# Patient Record
Sex: FEMALE | Race: WHITE | NOT HISPANIC OR LATINO | Employment: OTHER | URBAN - METROPOLITAN AREA
[De-identification: names, ages, dates, MRNs, and addresses within clinical notes are randomized per-mention and may not be internally consistent; named-entity substitution may affect disease eponyms.]

---

## 2017-01-02 ENCOUNTER — PRE VISIT (OUTPATIENT)
Dept: ORTHOPEDICS | Facility: CLINIC | Age: 57
End: 2017-01-02

## 2017-01-02 NOTE — TELEPHONE ENCOUNTER
1.  Date/reason for appt: 01/17/17 2:00pm cervicalgia and arm numbness appt per Kyra in PCP.  MRI and med rec in Epic chart.  No outside recs  2.  Referring provider: Anjel Busby MD  3.  Call to patient (Yes / No - short description): No, pt was referred.   4.  Previous care at / records requested from: Per appt notes, there are no outside recs. Epic has recs and PACS has imaging   [facility] w/ Dr. Busby - 12/20/16, 12/30/16  [facility] w/ Anjel Chris MD - 12/26/16  PACS - MR Cervical Spine (12/22/16)

## 2017-01-04 ENCOUNTER — OFFICE VISIT (OUTPATIENT)
Dept: SLEEP MEDICINE | Facility: CLINIC | Age: 57
End: 2017-01-04
Attending: FAMILY MEDICINE
Payer: COMMERCIAL

## 2017-01-04 VITALS
OXYGEN SATURATION: 96 % | HEIGHT: 66 IN | SYSTOLIC BLOOD PRESSURE: 154 MMHG | WEIGHT: 293 LBS | DIASTOLIC BLOOD PRESSURE: 94 MMHG | BODY MASS INDEX: 47.09 KG/M2 | HEART RATE: 86 BPM

## 2017-01-04 DIAGNOSIS — G47.10 HYPERSOMNOLENCE: ICD-10-CM

## 2017-01-04 DIAGNOSIS — G47.39 OTHER SLEEP APNEA: ICD-10-CM

## 2017-01-04 DIAGNOSIS — E66.01 MORBID OBESITY, UNSPECIFIED OBESITY TYPE (H): Primary | ICD-10-CM

## 2017-01-04 DIAGNOSIS — R06.89 DYSPNEA AND RESPIRATORY ABNORMALITY: ICD-10-CM

## 2017-01-04 DIAGNOSIS — I10 ESSENTIAL HYPERTENSION: ICD-10-CM

## 2017-01-04 DIAGNOSIS — G47.33 OBSTRUCTIVE SLEEP APNEA: ICD-10-CM

## 2017-01-04 DIAGNOSIS — R06.00 DYSPNEA AND RESPIRATORY ABNORMALITY: ICD-10-CM

## 2017-01-04 PROBLEM — R03.0 ELEVATED BLOOD PRESSURE (NOT HYPERTENSION): Status: ACTIVE | Noted: 2017-01-04

## 2017-01-04 PROCEDURE — 99205 OFFICE O/P NEW HI 60 MIN: CPT | Performed by: PHYSICIAN ASSISTANT

## 2017-01-04 RX ORDER — ZOLPIDEM TARTRATE 5 MG/1
TABLET ORAL
Qty: 1 TABLET | Refills: 0 | Status: SHIPPED | OUTPATIENT
Start: 2017-01-04 | End: 2017-01-12

## 2017-01-04 NOTE — PATIENT INSTRUCTIONS

## 2017-01-04 NOTE — MR AVS SNAPSHOT
After Visit Summary   1/4/2017    Shira Rodriguez    MRN: [id number]           Patient Information     Date Of Birth          1960        Visit Information        Provider Department      1/4/2017 9:30 AM Adelaide Livingston PA [facility]        Today's Diagnoses     Morbid obesity, unspecified obesity type (H) [E66.01]    -  1     Hypersomnolence         Obstructive sleep apnea         Essential hypertension         Other sleep apnea [G47.39]         Dyspnea and respiratory abnormality           Care Instructions      Your BMI is Body mass index is 64.75 kg/(m^2).  Weight management is a personal decision.  If you are interested in exploring weight loss strategies, the following discussion covers the approaches that may be successful. Body mass index (BMI) is one way to tell whether you are at a healthy weight, overweight, or obese. It measures your weight in relation to your height.  A BMI of 18.5 to 24.9 is in the healthy range. A person with a BMI of 25 to 29.9 is considered overweight, and someone with a BMI of 30 or greater is considered obese. More than two-thirds of American adults are considered overweight or obese.  Being overweight or obese increases the risk for further weight gain. Excess weight may lead to heart disease and diabetes.  Creating and following plans for healthy eating and physical activity may help you improve your health.  Weight control is part of healthy lifestyle and includes exercise, emotional health, and healthy eating habits. Careful eating habits lifelong are the mainstay of weight control. Though there are significant health benefits from weight loss, long-term weight loss with diet alone may be very difficult to achieve- studies show long-term success with dietary management in less than 10% of people. Attaining a healthy weight may be especially difficult to achieve in those with severe obesity. In some cases, medications, devices and surgical  management might be considered.  What can you do?  If you are overweight or obese and are interested in methods for weight loss, you should discuss this with your provider.     Consider reducing daily calorie intake by 500 calories.     Keep a food journal.     Avoiding skipping meals, consider cutting portions instead.    Diet combined with exercise helps maintain muscle while optimizing fat loss. Strength training is particularly important for building and maintaining muscle mass. Exercise helps reduce stress, increase energy, and improves fitness. Increasing exercise without diet control, however, may not burn enough calories to loose weight.       Start walking three days a week 10-20 minutes at a time    Work towards walking thirty minutes five days a week     Eventually, increase the speed of your walking for 1-2 minutes at time    In addition, we recommend that you review healthy lifestyles and methods for weight loss available through the National Institutes of Health patient information sites:  http://win.niddk.nih.gov/publications/index.htm    And look into health and wellness programs that may be available through your health insurance provider, employer, local community center, or [employer].    Weight management plan: Patient was referred to their PCP to discuss a diet and exercise plan.          Follow-ups after your visit        Your next 10 appointments already scheduled     Romulo 10, 2017 10:00 AM   EMG with Sharan Bonilla MD   [facility] ([facility])    [address], [address]  [facility] [id number] [phone]            Jan 11, 2017  8:00 PM   Psg Split W/Tcm with SLEEP LAB, BED FOUR   [facility] ([facility]    [address] [place] [id number]   [phone]            Jan 12, 2017  1:35 PM   (Arrive by 1:20 PM)   Return Visit with Anjel Busby MD   [facility]  ([facility])    [address]  4th [address]  [facility] [address] [phone]            Jan 17, 2017  9:30 AM   Return Sleep Patient with ZEKE Jarquin   [facility] ([facility])    [address]  [facility] [address]   [phone]            Jan 17, 2017  4:00 PM   (Arrive by 3:45 PM)   New Patient Visit with Tashi Saxena MD   [facility] ([facility])    [address]  [address] [facility] [id number] [phone]              Future tests that were ordered for you today     Open Future Orders        Priority Expected Expires Ordered    ABG-Blood Gas Arterial ([facility] [place] [facility]) Routine  3/5/2017 1/4/2017    Comprehensive Sleep Study Routine  7/3/2017 1/4/2017            Who to contact     If you have questions or need follow up information about today's clinic visit or your schedule please contact [facility] directly at [phone].  Normal or non-critical lab and imaging results will be communicated to you by MyChart, letter or phone within 4 business days after the clinic has received the results. If you do not hear from us within 7 days, please contact the clinic through [employer] or phone. If you have a critical or abnormal lab result, we will notify you by phone as soon as possible.  Submit refill requests through [employer] or call your pharmacy and they will forward the refill request to us. Please allow 3 business days for your refill to be completed.          Additional Information About Your Visit        MyChart Information     [employer] gives you secure access to your electronic health record. If you see a primary care provider, you can also send messages to your care team and make appointments. If you have questions, please call your primary care clinic.  If you do not have a primary care provider, please call  "[phone] and they will assist you.        Care EveryWhere ID     This is your Care EveryWhere ID. This could be used by other organizations to access your [place] medical records  [id number]        Your Vitals Were     Pulse Height BMI (Body Mass Index) Pulse Oximetry          86 1.676 m (5' 6\") 64.75 kg/m2 96%         Blood Pressure from Last 3 Encounters:   01/04/17 174/96   12/30/16 154/79   12/20/16 175/84    Weight from Last 3 Encounters:   01/04/17 181.892 kg (401 lb)   12/30/16 181.892 kg (401 lb)   12/26/16 183.707 kg (405 lb)              We Performed the Following     SLEEP EVALUATION & MANAGEMENT REFERRAL - ADULT          Today's Medication Changes          These changes are accurate as of: 1/4/17 10:32 AM.  If you have any questions, ask your nurse or doctor.               Start taking these medicines.        Dose/Directions    zolpidem 5 MG tablet   Commonly known as:  AMBIEN   Started by:  Adelaide Livingston PA        Take tablet by mouth 15 minutes prior to sleep, for Sleep Study   Quantity:  1 tablet   Refills:  0            Where to get your medicines      Some of these will need a paper prescription and others can be bought over the counter.  Ask your nurse if you have questions.     Bring a paper prescription for each of these medications    - zolpidem 5 MG tablet             Primary Care Provider Office Phone # Fax #    Anjel Busby [phone]       [facility] [address] [place] [id number]        Thank you!     Thank you for choosing [facility]  for your care. Our goal is always to provide you with excellent care. Hearing back from our patients is one way we can continue to improve our services. Please take a few minutes to complete the written survey that you may receive in the mail after your visit with us. Thank you!             Your Updated Medication List - Protect others around you: Learn how to safely use, store and " throw away your medicines at www.disposemymeds.org.          This list is accurate as of: 1/4/17 10:32 AM.  Always use your most recent med list.                   Brand Name Dispense Instructions for use    albuterol 108 (90 BASE) MCG/ACT Inhaler    PROAIR HFA/PROVENTIL HFA/VENTOLIN HFA    1 Inhaler    Inhale 2 puffs into the lungs every 6 hours as needed for shortness of breath / dyspnea or wheezing       baclofen 10 MG tablet    LIORESAL    90 tablet    Take 1 tablet (10 mg) by mouth 3 times daily       budesonide-formoterol 160-4.5 MCG/ACT Inhaler    SYMBICORT    1 Inhaler    Inhale 2 puffs into the lungs 2 times daily       doxycycline 100 MG capsule    VIBRAMYCIN    28 capsule    Take 1 capsule (100 mg) by mouth 2 times daily       fluocinonide 0.05 % ointment    LIDEX    60 g    Apply topically daily To hands and feet when dermatitis active       furosemide 20 MG tablet    LASIX    30 tablet    Take 1 tablet (20 mg) by mouth daily       SERTRALINE HCL PO      Take 50 mg by mouth daily       zolpidem 5 MG tablet    AMBIEN    1 tablet    Take tablet by mouth 15 minutes prior to sleep, for Sleep Study

## 2017-01-04 NOTE — PROGRESS NOTES
Sleep Consultation:    Date on this visit: 1/4/2017    Shira Rodriguez  is referred by Anjel Busby for a sleep consultation.     Primary Physician: Anjel Busby     Chief Complaint   Patient presents with     Sleep Problem     Consult CYRUS, tried and failed CPAP, discuss options and treatment       HPI: Shira Rodriguez is a 56 year old female with medical history remarkable for depression, morbid obesity, numbness and tingling in hands (MRI cervical spine normal) and  CYRUS.     In review, she states sleep has been a problem for more than 30  years. She was originally seen at [facility] for snoring and excessive daytime sleepiness and underwent a sleep study on 1/3/2006.She was diagnosed with moderate obstructive sleep apnea and  prescribed CPAP. She did not tolerate CPAP due to sleeping on stomach.   Sleep study interpreation acquired and her AHI was 16.6( most evident in the supine position). Weigh Information is not available. Lowest O2 sat was 89%.     Shira goes to sleep at 11:00 PM during the week. She sleeps on the sofa.  She wakes up at 5:00 AM with an alarm. She falls asleep in 30 minutes.  She wakes up 2 times a night for 4-5 minutes before falling back to sleep.  Shira wakes up to go to the bathroom.  On weekends, Shira goes to sleep at 12:00 AM.  She wakes up at 7:00 AM with an alarm. She falls asleep in 30 minutes.  Patient gets an average of 6 hours of sleep per night. She does not feel refreshed in the mornings.     Patient does use electronics in bed, watch TV in bed and read in bed.     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     Shira does snore every night and snoring is very loud. Patient does not have a regular bed partner. There is report of snoring and gasping.  She does have witnessed apneas.   Patient sleeps on her back, side and stomach. She has frequent morning dry mouth and no morning headaches, morning confusion and restless legs.  Shira denies  any bruxism, sleep walking, sleep talking, dream enactment, sleep paralysis, cataplexy and hypnogogic/hypnopompic hallucinations.    Shira denies difficulty breathing through her nose, claustrophobia and reflux at night.      Shira has gained 100 pounds in 10 years.  Patient describes herself as a night person.  She would prefer to go to sleep at 11:00 PM and wake up at 6:00 AM.  Patient's [place] Sleepiness score 11/24 consistent with some daytime sleepiness.      Shira naps 7 times per week for 60 minutes, feels unrefreshed after naps. She takes some inadvertant naps.  She denies falling asleep while driving.  Patient was counseled on the importance of driving while alert, to pull over if drowsy, or nap before getting into the vehicle if sleepy.  She uses 3-4 cups/day of coffee. Last caffeine intake is usually before 3 PM.      Allergies:    Allergies   Allergen Reactions     Adhesive Tape Blisters     Erythromycin Rash       Medications:    Current Outpatient Prescriptions   Medication Sig Dispense Refill     zolpidem (AMBIEN) 5 MG tablet Take tablet by mouth 15 minutes prior to sleep, for Sleep Study 1 tablet 0     baclofen (LIORESAL) 10 MG tablet Take 1 tablet (10 mg) by mouth 3 times daily 90 tablet 1     doxycycline (VIBRAMYCIN) 100 MG capsule Take 1 capsule (100 mg) by mouth 2 times daily 28 capsule 0     SERTRALINE HCL PO Take 50 mg by mouth daily       furosemide (LASIX) 20 MG tablet Take 1 tablet (20 mg) by mouth daily 30 tablet 0     albuterol (PROAIR HFA/PROVENTIL HFA/VENTOLIN HFA) 108 (90 BASE) MCG/ACT Inhaler Inhale 2 puffs into the lungs every 6 hours as needed for shortness of breath / dyspnea or wheezing 1 Inhaler 0     fluocinonide (LIDEX) 0.05 % ointment Apply topically daily To hands and feet when dermatitis active 60 g 2     budesonide-formoterol (SYMBICORT) 160-4.5 MCG/ACT inhaler Inhale 2 puffs into the lungs 2 times daily 1 Inhaler 1       Problem List:  Patient Active Problem List     Diagnosis Date Noted     CYRUS (obstructive sleep apnea) 01/04/2017     Priority: Medium     1/30/2016-AHI 16.6, lowest oxygen saturation was 89%       Elevated blood pressure (not hypertension) 01/04/2017     Priority: Medium     Degeneration of cervical intervertebral disc 12/26/2016     Priority: Medium     Arthritis of knee, degenerative 06/14/2012     Priority: Medium     Morbid obesity (H) 01/11/2012     Priority: Medium     Adjustment disorder with depressed mood 10/27/2011     Priority: Medium     Tobacco use disorder 10/27/2011     Priority: Medium     Primary localized osteoarthrosis, pelvic region and thigh 06/02/2004     Priority: Medium        Past Medical/Surgical History:  Past Medical History   Diagnosis Date     Secondary localized osteoarthrosis, pelvic region and thigh 7/12/04     Hospitalized     Contact dermatitis      Traumatic arthropathy      Eczema      HANDS     Osteoarthrosis      Knee pain, left      Cough 7/30/2015     History of total hip replacement 10/27/2011     SOB (shortness of breath) 7/30/2015     Past Surgical History   Procedure Laterality Date     C total hip arthroplasty  07/09/04     RT     Hip arthroscopy[       Arthroplasty knee  6/14/2012     Procedure: ARTHROPLASTY KNEE;  Left Total Knee Arthroplasty;  Surgeon: Annette Quesada MD;  Location:  OR       Social History:  Social History     Social History     Marital Status: Single     Spouse Name: N/A     Number of Children: N/A     Years of Education: N/A     Occupational History     Not on file.     Social History Main Topics     Smoking status: Current Every Day Smoker -- 0.75 packs/day for 33 years     Types: Cigarettes     Start date: 01/01/1982     Smokeless tobacco: Never Used     Alcohol Use: 0.0 oz/week     0 Standard drinks or equivalent per week      Comment: OCCASSIONALLY 2 DRINKS PER MONTH     Drug Use: No     Sexual Activity: Not on file     Other Topics Concern     Not on file     Social History  "Narrative       Family History:  Family History   Problem Relation Age of Onset     Thyroid Disease Mother      sister     Circulatory Mother      CVA p endarderectomy     Hypertension Mother      Breast Cancer Paternal Grandmother      CANCER Paternal Grandmother      pancreatic     Cardiovascular Paternal Aunt      Cardiovascular Paternal Uncle      Depression Other      Skin Cancer Mother      MMohs surgery with skin graft       Review of Systems:  A complete review of systems reviewed by me is negative with the exeption of what has been mentioned in the history of present illness.  CONSTITUTIONAL:  POSITIVE for  drug allergies see list  EYES: NEGATIVE for changes in vision, blind spots, double vision.  ENT: NEGATIVE for ear pain, sore throat, sinus pain, post-nasal drip, runny nose, bloody nose  CARDIAC:  POSITIVE for  swollen legs, swollen feet and hypertension  NEUROLOGIC:  POSITIVE for  weakness or numbness in the arms or legs  DERMATOLOGIC: NEGATIVE for rashes, new moles or change in mole(s)  PULMONARY:  POSITIVE for  SOB with activity, dry cough, productive cough and coughing up blood  GASTROINTESTINAL: NEGATIVE for nausea or vomitting, loose or watery stools, fat or grease in stools, constipation, abdominal pain, bowel movements black in color or blood noted.  GENITOURINARY: NEGATIVE for pain during urination, blood in urine, urinating more frequently than usual, irregular menstrual periods.  MUSCULOSKELETAL:  POSITIVE for  bone or joint pain  ENDOCRINE: NEGATIVE for increased thirst or urination, diabetes.  LYMPHATIC: NEGATIVE for swollen lymph nodes, lumps or bumps in the breasts or nipple discharge.    Physical Examination:  Vitals: /94 mmHg  Pulse 86  Ht 1.676 m (5' 6\")  Wt 181.892 kg (401 lb)  BMI 64.75 kg/m2  SpO2 96%  BMI= Body mass index is 64.75 kg/(m^2).         Brohman Total Score 1/4/2017   Total score - Brohman 11       GENERAL APPEARANCE: alert and no distress  EYES: Eyes grossly " normal to inspection, PERRL and conjunctivae and sclerae normal  HENT: ear canals and TM's normal, nose and mouth without ulcers or lesions, oropharynx crowded and tongue base enlarged  NECK: no adenopathy, no asymmetry, masses, or scars and thyroid normal to palpation  RESP: scattered inspiratory and expiratory wheezes   CV: regular rates and rhythm and normal S1 S2, no S3 or S4  LYMPHATICS: normal ant/post cervical and supraclavicular nodes  MS: pitting 1+ lower extremity edema bilaterally  NEURO: Normal strength and tone, mentation intact and speech normal  PSYCH: mentation appears normal,and affect flat  Mallampati Class: III.  Tonsillar Stage: 1  hidden by pillars.    HEMOGLOBIN   Date Value Ref Range Status   2016 16.7* 11.7 - 15.7 g/dL Final   WBC      7.0   2016  RBC     5.67   2016  HGB     16.7   2016  HCT     50.9   2016  MCV       90   2016  MCH     29.5   2016  MCHC     32.8   2016  RDW     13.7   2016  PLT      216   2016  TSH   Date Value Ref Range Status   2016 1.66 0.40 - 4.00 mU/L Final   ]  Last Basic Metabolic Panel:  NA      139   2016   POTASSIUM      4.2   2016  CHLORIDE      106   2016  MARIA INES      9.2   2016  CO2       26   2016  BUN        9   2016  CR     0.59   2016  GLC       90   2016    Impression:   1. History of moderate obstructive sleep apnea, not currently treated. ~100 lb weight gain since her last sleep study.   2. Morbid obesity with BMI of 64 kg/m^2.   3. Erythrocytosis, high Hgb count   6. suspicion for co-existing sleep related hypoxemia and hypoventilation, related to #1, #2 and #3.   Plan:   In this context, I would recommend the followin. Polysomnogram with transcutaneous carbon dioxide monitoring and pre-study ABG to evaluate for obstructive sleep apnea, hypoventilation and hypoxemia. Ambien if needed.   2. Weight management.     Literature provided  regarding sleep apnea and sleep hygiene.      She will follow up with me in approximately two weeks after her sleep study has been completed to review the results and discuss plan of care.       Polysomnography reviewed.  Obstructive sleep apnea reviewed.  Complications of untreated sleep apnea were reviewed.    I have spent 60 minutes with this patient today in which greater than 50% of this time was spent in the counseling / coordination of care regarding CYRUS and hypoventialtion    Adelaide Livingston PA-C      CC: Anjel Busby

## 2017-01-10 ENCOUNTER — OFFICE VISIT (OUTPATIENT)
Dept: NEUROLOGY | Facility: CLINIC | Age: 57
End: 2017-01-10

## 2017-01-10 DIAGNOSIS — R20.2 PARESTHESIA AND PAIN OF BOTH UPPER EXTREMITIES: ICD-10-CM

## 2017-01-10 DIAGNOSIS — G56.03 BILATERAL CARPAL TUNNEL SYNDROME: Primary | ICD-10-CM

## 2017-01-10 DIAGNOSIS — M79.601 PARESTHESIA AND PAIN OF BOTH UPPER EXTREMITIES: ICD-10-CM

## 2017-01-10 DIAGNOSIS — M79.602 PARESTHESIA AND PAIN OF BOTH UPPER EXTREMITIES: ICD-10-CM

## 2017-01-10 NOTE — Clinical Note
1/10/2017       RE: Shira Rodriguez  [address] [facility] [address]     Dear Colleague,    Thank you for referring your patient, Shira Rodriguez, to the [facility] NEUROLOG[facility] at [facility]. Please see a copy of my visit note below.        [facility]   Electrodiagnostic Laboratory    Nerve Conduction & EMG Report          Patient: Shira Rodriguez YOB: 1960  Patient ID: [id number] Age: 56 Years 8 Months  Gender: Female        History & Examination:  56 year old woman with numbness in both hands, lateral > medial fingers. Sometimes has pain in hands as well. Right more affected than left. Eval for focal neuropathy.     Techniques: Motor and sensory conduction studies were done with surface recording electrodes. EMG was done with a concentric needle electrode.     Results:  Nerve conduction studies:  1. Right median-D2 sensory response is absent.   2. Left median-D2 sensory response shows moderately slowed CV and moderately reduced amplitude.   3. Bilateral ulnar-D5 and radial sensory responses were normal.   4. Left median ulnar palmar interlatency difference was prolonged.   5. Right median-APB motor response showed moderately prolonged DL, mildly reduced amplitude, and normal CV in the forearm.   6. Left median-APB motor response showed mildly prolonged DL, normal amplitude, and normal CV in the forearm.   7. Bilateral ulnar-ADM motor responses were normal.   8. Right median-lumbrical compared to ulnar-interossei interlatency difference was prolonged.     Needle EM. Fibrillation potentials and positive sharp waves were seen in the right APB muscle.    2. Large amplitude and/or long duration motor unit potentials (MUP) with increased polyphasia were seen in the bilateral APB muscles.    3. Rapidly firing MUPs with reduced recruitment were seen in the bilateral APB muscles.       Interpretation:  This is an abnormal study. There is electrophysiologic evidence of a severe right-sided and a moderate left-sided median neuropathy at the wrist (e.g., bilateral carpal tunnel syndrome). Clinical correlation is recommended.      Sharan Bonilla MD  Department of Neurology         Sensory NCS      Nerve / Sites Rec. Site Onset Lat Peak Lat NP Amp Segments Distance Onset Emile Temp.     ms ms  V  cm m/s  C   L MEDIAN - Dig II Anti      Wrist Dig II 3.65 4.95 6.5 Wrist - Dig II 14 38.4 33.6   R MEDIAN - Dig II Anti      Wrist Dig II NR NR NR Wrist - Dig II 14 NR 31.7   L ULNAR - Dig V Anti      Wrist Dig V 2.14 3.02 19.6 Wrist - Dig V 12.5 58.5 33.5   R ULNAR - Dig V Anti      Wrist Dig V 2.03 2.81 18.3 Wrist - Dig V 12.5 61.5 30.8   L RADIAL - Snuffbox      Forearm Snuffbox 1.46 1.98 33.7 Forearm - Snuffbox 10 68.6 34   R RADIAL - Snuffbox      Forearm Snuffbox 1.46 1.82 33.5 Forearm - Snuffbox 10 68.6 32   L MEDIAN PALMAR - Ulnar - Palmar      Palm (Median) Wrist 2.03 2.45 4.6 Palm (Median) - Wrist 8 39.4 33.8      Palmar (Ulnar) Wrist 1.35 1.93 8.1 Palmar (Ulnar) - Wrist 8 59.1 33.6   R MEDIAN PALMAR - Ulnar - Palmar      Palm (Median) Wrist NR NR NR Palm (Median) - Wrist 8 NR 26.2      Palmar (Ulnar) Wrist 1.25 1.82 6.2 Palmar (Ulnar) - Wrist 8 64.0 26.2       Motor NCS      Nerve / Sites Rec. Site Lat Amp Dur. Segments Dist. Emile Temp Area     ms mV %  cm m/s  C %   L MEDIAN - APB      Wrist APB 4.74 7.3 100 Wrist - APB 8  34.3 100      Elbow APB 9.01 7.1 95.8 Elbow - Wrist 23.5 55 34.1 93.8   R MEDIAN - APB      Wrist APB 6.61 4.7 100 Wrist - APB 8  33.3 100      Elbow APB 10.83 4.5 105 Elbow - Wrist 25 59 33.3 103   L ULNAR - ADM      Wrist ADM 2.86 10.2 100 Wrist - ADM 8  33.9 100      B.Elbow ADM 5.99 9.8 108 B.Elbow - Wrist 19 61 34 95.6      A.Elbow ADM 7.34 9.6 107 A.Elbow - B.Elbow 10 74 34 96.5   R ULNAR - ADM      Wrist ADM 2.86 7.8 100 Wrist - ADM 8  33.3 100      B.Elbow ADM 6.04 6.9  106 B.Elbow - Wrist 19.5 61 33.3 92.3      A.Elbow ADM 7.14 7.3 108 A.Elbow - B.Elbow 8 73 33.3 98.5   R MEDIAN - Ulnar comparison      Ulnar Interossei 5.05 1.4 141 Ulnar - Interossei 10  32.2 41.3      Median Lumbical 2.66 5.8 100 Median - Lumbical 10  32.2 100       F  Wave      Nerve Fmin    ms    Left Right   MEDIAN - APB 29.90 31.30   ULNAR - ADM 26.98 27.60       EMG Summary Table     Spontaneous MUAP Recruitment    IA Fib PSW Fasc H.F. Amp Dur. PPP Pattern   R. FIRST D INTEROSS N None None None None N N N N   R. ABD POLL BREVIS 2+ 2+ 2+ None None 1+ 2+ 1+ Mild Reduced   R. FLEX POLL LONG N None None None None N N N N   L. FIRST D INTEROSS N None None None None N N N N   L. ABD POLL BREVIS N None None None None 2+ 1+ 1+ Mild Reduced                                               Again, thank you for allowing me to participate in the care of your patient.      Sincerely,    Sharan Bonilla MD

## 2017-01-10 NOTE — PROGRESS NOTES
[facility] Neurology Clinic   Electrodiagnostic Laboratory    Nerve Conduction & EMG Report          Patient: Shira Rodriguez YOB: 1960  Patient ID: [id number] Age: 56 Years 8 Months  Gender: Female        History & Examination:  56 year old woman with numbness in both hands, lateral > medial fingers. Sometimes has pain in hands as well. Right more affected than left. Eval for focal neuropathy.     Techniques: Motor and sensory conduction studies were done with surface recording electrodes. EMG was done with a concentric needle electrode.     Results:  Nerve conduction studies:  1. Right median-D2 sensory response is absent.   2. Left median-D2 sensory response shows moderately slowed CV and moderately reduced amplitude.   3. Bilateral ulnar-D5 and radial sensory responses were normal.   4. Left median ulnar palmar interlatency difference was prolonged.   5. Right median-APB motor response showed moderately prolonged DL, mildly reduced amplitude, and normal CV in the forearm.   6. Left median-APB motor response showed mildly prolonged DL, normal amplitude, and normal CV in the forearm.   7. Bilateral ulnar-ADM motor responses were normal.   8. Right median-lumbrical compared to ulnar-interossei interlatency difference was prolonged.     Needle EM. Fibrillation potentials and positive sharp waves were seen in the right APB muscle.    2. Large amplitude and/or long duration motor unit potentials (MUP) with increased polyphasia were seen in the bilateral APB muscles.    3. Rapidly firing MUPs with reduced recruitment were seen in the bilateral APB muscles.      Interpretation:  This is an abnormal study. There is electrophysiologic evidence of a severe right-sided and a moderate left-sided median neuropathy at the wrist (e.g., bilateral carpal tunnel syndrome). Clinical correlation is recommended.      Sharan Bonilla MD  Department of Neurology         Sensory NCS       Nerve / Sites Rec. Site Onset Lat Peak Lat NP Amp Segments Distance Onset Emile Temp.     ms ms  V  cm m/s  C   L MEDIAN - Dig II Anti      Wrist Dig II 3.65 4.95 6.5 Wrist - Dig II 14 38.4 33.6   R MEDIAN - Dig II Anti      Wrist Dig II NR NR NR Wrist - Dig II 14 NR 31.7   L ULNAR - Dig V Anti      Wrist Dig V 2.14 3.02 19.6 Wrist - Dig V 12.5 58.5 33.5   R ULNAR - Dig V Anti      Wrist Dig V 2.03 2.81 18.3 Wrist - Dig V 12.5 61.5 30.8   L RADIAL - Snuffbox      Forearm Snuffbox 1.46 1.98 33.7 Forearm - Snuffbox 10 68.6 34   R RADIAL - Snuffbox      Forearm Snuffbox 1.46 1.82 33.5 Forearm - Snuffbox 10 68.6 32   L MEDIAN PALMAR - Ulnar - Palmar      Palm (Median) Wrist 2.03 2.45 4.6 Palm (Median) - Wrist 8 39.4 33.8      Palmar (Ulnar) Wrist 1.35 1.93 8.1 Palmar (Ulnar) - Wrist 8 59.1 33.6   R MEDIAN PALMAR - Ulnar - Palmar      Palm (Median) Wrist NR NR NR Palm (Median) - Wrist 8 NR 26.2      Palmar (Ulnar) Wrist 1.25 1.82 6.2 Palmar (Ulnar) - Wrist 8 64.0 26.2       Motor NCS      Nerve / Sites Rec. Site Lat Amp Dur. Segments Dist. Emile Temp Area     ms mV %  cm m/s  C %   L MEDIAN - APB      Wrist APB 4.74 7.3 100 Wrist - APB 8  34.3 100      Elbow APB 9.01 7.1 95.8 Elbow - Wrist 23.5 55 34.1 93.8   R MEDIAN - APB      Wrist APB 6.61 4.7 100 Wrist - APB 8  33.3 100      Elbow APB 10.83 4.5 105 Elbow - Wrist 25 59 33.3 103   L ULNAR - ADM      Wrist ADM 2.86 10.2 100 Wrist - ADM 8  33.9 100      B.Elbow ADM 5.99 9.8 108 B.Elbow - Wrist 19 61 34 95.6      A.Elbow ADM 7.34 9.6 107 A.Elbow - B.Elbow 10 74 34 96.5   R ULNAR - ADM      Wrist ADM 2.86 7.8 100 Wrist - ADM 8  33.3 100      B.Elbow ADM 6.04 6.9 106 B.Elbow - Wrist 19.5 61 33.3 92.3      A.Elbow ADM 7.14 7.3 108 A.Elbow - B.Elbow 8 73 33.3 98.5   R MEDIAN - Ulnar comparison      Ulnar Interossei 5.05 1.4 141 Ulnar - Interossei 10  32.2 41.3      Median Lumbical 2.66 5.8 100 Median - Lumbical 10  32.2 100       F  Wave      Nerve Fmin    ms    Left Right    MEDIAN - APB 29.90 31.30   ULNAR - ADM 26.98 27.60       EMG Summary Table     Spontaneous MUAP Recruitment    IA Fib PSW Fasc H.F. Amp Dur. PPP Pattern   R. FIRST D INTEROSS N None None None None N N N N   R. ABD POLL BREVIS 2+ 2+ 2+ None None 1+ 2+ 1+ Mild Reduced   R. FLEX POLL LONG N None None None None N N N N   L. FIRST D INTEROSS N None None None None N N N N   L. ABD POLL BREVIS N None None None None 2+ 1+ 1+ Mild Reduced

## 2017-01-11 ENCOUNTER — THERAPY VISIT (OUTPATIENT)
Dept: SLEEP MEDICINE | Facility: CLINIC | Age: 57
End: 2017-01-11
Payer: COMMERCIAL

## 2017-01-11 ENCOUNTER — HOSPITAL ENCOUNTER (OUTPATIENT)
Dept: RESPIRATORY THERAPY | Facility: CLINIC | Age: 57
Discharge: HOME | End: 2017-01-11
Attending: FAMILY MEDICINE | Admitting: FAMILY MEDICINE
Payer: COMMERCIAL

## 2017-01-11 DIAGNOSIS — G47.10 HYPERSOMNOLENCE: ICD-10-CM

## 2017-01-11 DIAGNOSIS — I10 ESSENTIAL HYPERTENSION: ICD-10-CM

## 2017-01-11 DIAGNOSIS — E66.01 MORBID OBESITY, UNSPECIFIED OBESITY TYPE (H): ICD-10-CM

## 2017-01-11 DIAGNOSIS — R06.00 DYSPNEA AND RESPIRATORY ABNORMALITY: ICD-10-CM

## 2017-01-11 DIAGNOSIS — G47.39 OTHER SLEEP APNEA: ICD-10-CM

## 2017-01-11 DIAGNOSIS — R06.89 DYSPNEA AND RESPIRATORY ABNORMALITY: ICD-10-CM

## 2017-01-11 DIAGNOSIS — G47.33 OBSTRUCTIVE SLEEP APNEA: ICD-10-CM

## 2017-01-11 LAB
BASE EXCESS BLDA CALC-SCNC: 0.4 MMOL/L
HCO3 BLD-SCNC: 26 MMOL/L (ref 21–28)
O2/TOTAL GAS SETTING VFR VENT: ABNORMAL %
PCO2 BLD: 43 MM HG (ref 35–45)
PH BLD: 7.39 PH (ref 7.35–7.45)
PO2 BLD: 74 MM HG (ref 80–105)

## 2017-01-11 PROCEDURE — 95811 POLYSOM 6/>YRS CPAP 4/> PARM: CPT | Performed by: FAMILY MEDICINE

## 2017-01-11 PROCEDURE — 82803 BLOOD GASES ANY COMBINATION: CPT | Performed by: PHYSICIAN ASSISTANT

## 2017-01-11 PROCEDURE — 36600 WITHDRAWAL OF ARTERIAL BLOOD: CPT

## 2017-01-12 ENCOUNTER — OFFICE VISIT (OUTPATIENT)
Dept: FAMILY MEDICINE | Facility: CLINIC | Age: 57
End: 2017-01-12

## 2017-01-12 VITALS
HEART RATE: 97 BPM | SYSTOLIC BLOOD PRESSURE: 169 MMHG | WEIGHT: 293 LBS | DIASTOLIC BLOOD PRESSURE: 93 MMHG | BODY MASS INDEX: 64.27 KG/M2

## 2017-01-12 DIAGNOSIS — R06.09 DOE (DYSPNEA ON EXERTION): Primary | ICD-10-CM

## 2017-01-12 DIAGNOSIS — R06.09 DOE (DYSPNEA ON EXERTION): ICD-10-CM

## 2017-01-12 LAB
ANION GAP SERPL CALCULATED.3IONS-SCNC: 7 MMOL/L (ref 3–14)
BASOPHILS # BLD AUTO: 0 10E9/L (ref 0–0.2)
BASOPHILS NFR BLD AUTO: 0.5 %
BUN SERPL-MCNC: 18 MG/DL (ref 7–30)
CALCIUM SERPL-MCNC: 9 MG/DL (ref 8.5–10.1)
CHLORIDE SERPL-SCNC: 106 MMOL/L (ref 94–109)
CO2 SERPL-SCNC: 27 MMOL/L (ref 20–32)
CREAT SERPL-MCNC: 0.51 MG/DL (ref 0.52–1.04)
DIFFERENTIAL METHOD BLD: ABNORMAL
EOSINOPHIL # BLD AUTO: 0.2 10E9/L (ref 0–0.7)
EOSINOPHIL NFR BLD AUTO: 2.4 %
ERYTHROCYTE [DISTWIDTH] IN BLOOD BY AUTOMATED COUNT: 13.9 % (ref 10–15)
GFR SERPL CREATININE-BSD FRML MDRD: ABNORMAL ML/MIN/1.7M2
GLUCOSE SERPL-MCNC: 101 MG/DL (ref 70–99)
HCT VFR BLD AUTO: 50.3 % (ref 35–47)
HGB BLD-MCNC: 16.7 G/DL (ref 11.7–15.7)
IMM GRANULOCYTES # BLD: 0 10E9/L (ref 0–0.4)
IMM GRANULOCYTES NFR BLD: 0.4 %
LYMPHOCYTES # BLD AUTO: 2.1 10E9/L (ref 0.8–5.3)
LYMPHOCYTES NFR BLD AUTO: 27.6 %
MCH RBC QN AUTO: 29.7 PG (ref 26.5–33)
MCHC RBC AUTO-ENTMCNC: 33.2 G/DL (ref 31.5–36.5)
MCV RBC AUTO: 90 FL (ref 78–100)
MONOCYTES # BLD AUTO: 0.6 10E9/L (ref 0–1.3)
MONOCYTES NFR BLD AUTO: 7.1 %
NEUTROPHILS # BLD AUTO: 4.8 10E9/L (ref 1.6–8.3)
NEUTROPHILS NFR BLD AUTO: 62 %
NRBC # BLD AUTO: 0 10*3/UL
NRBC BLD AUTO-RTO: 0 /100
PLATELET # BLD AUTO: 224 10E9/L (ref 150–450)
POTASSIUM SERPL-SCNC: 4.1 MMOL/L (ref 3.4–5.3)
RBC # BLD AUTO: 5.62 10E12/L (ref 3.8–5.2)
SODIUM SERPL-SCNC: 140 MMOL/L (ref 133–144)
WBC # BLD AUTO: 7.8 10E9/L (ref 4–11)

## 2017-01-12 ASSESSMENT — ENCOUNTER SYMPTOMS
SHORTNESS OF BREATH: 1
BOWEL INCONTINENCE: 0
CONSTIPATION: 0
NERVOUS/ANXIOUS: 1
JAUNDICE: 0
EXERCISE INTOLERANCE: 0
NUMBNESS: 1
INSOMNIA: 1
NIGHT SWEATS: 0
COUGH DISTURBING SLEEP: 1
ORTHOPNEA: 0
BLOOD IN STOOL: 0
DISTURBANCES IN COORDINATION: 0
SYNCOPE: 0
SEIZURES: 0
HYPOTENSION: 0
POLYDIPSIA: 0
VOMITING: 0
NECK PAIN: 0
SPEECH CHANGE: 0
SLEEP DISTURBANCES DUE TO BREATHING: 0
BLOATING: 0
DIZZINESS: 1
STIFFNESS: 1
RECTAL PAIN: 0
MUSCLE CRAMPS: 0
FATIGUE: 1
WEAKNESS: 0
TINGLING: 1
POLYPHAGIA: 0
DEPRESSION: 1
ARTHRALGIAS: 1
HALLUCINATIONS: 0
WEIGHT GAIN: 0
MEMORY LOSS: 0
SPUTUM PRODUCTION: 1
PARALYSIS: 0
NAUSEA: 0
CHILLS: 0
HYPERTENSION: 1
DECREASED APPETITE: 0
FEVER: 0
ABDOMINAL PAIN: 0
TACHYCARDIA: 0
DIARRHEA: 0
LOSS OF CONSCIOUSNESS: 0
LIGHT-HEADEDNESS: 1
LEG SWELLING: 1
WEIGHT LOSS: 0
DYSPNEA ON EXERTION: 1
HEARTBURN: 0
MYALGIAS: 1
PANIC: 0
CLAUDICATION: 0
MUSCLE WEAKNESS: 0
COUGH: 1
RESPIRATORY PAIN: 0
LEG PAIN: 0
ALTERED TEMPERATURE REGULATION: 0
SNORES LOUDLY: 1
RECTAL BLEEDING: 1
HEMOPTYSIS: 0
POSTURAL DYSPNEA: 0
INCREASED ENERGY: 0
JOINT SWELLING: 0
PALPITATIONS: 0
HEADACHES: 0
TREMORS: 0
DECREASED CONCENTRATION: 1
WHEEZING: 1
BACK PAIN: 1

## 2017-01-12 ASSESSMENT — PAIN SCALES - GENERAL: PAINLEVEL: NO PAIN (0)

## 2017-01-12 NOTE — PATIENT INSTRUCTIONS
[facility] Medication Refill Request Information:  * Please contact your pharmacy regarding ANY request for medication refills.  ** [facility] Prescription Fax = [phone]  * Please allow 3 business days for routine medication refills.  * Please allow 5 business days for controlled substance medication refills.     [facility] Test Result notification information:  *You will be notified with in 7-10 days of your appointment day regarding the results of your test.  If you are on MyChart you will be notified as soon as the provider has reviewed the results and signed off on them.    Please schedule the following appointments:  Cardiovascular [phone] (3rd Floor [facility])   Lung Science [phone] (3rd Floor [facility])

## 2017-01-12 NOTE — PROGRESS NOTES
Pt arrived at [facility] Sleep lab for sleep study.  Completed a split night PSG per provider order.    Preliminary AHI= 85.  A final therapeutic PAP pressure was not achieved.    Supine REM was not seen on therapeutic pressure.    Patient reports feeling refreshed in AM.    MEDICATIONS: zolpidem (AMBIEN) 5 MG tablet     baclofen (LIORESAL) 10 MG tablet    doxycycline (VIBRAMYCIN) 100 MG capsule   SERTRALINE HCL PO   furosemide (LASIX) 20 MG tablet   albuterol (PROAIR HFA/PROVENTIL HFA/VENTOLIN HFA) 108 (90 BASE) MCG/ACT Inhaler    fluocinonide (LIDEX) 0.05 % ointment   budesonide-formoterol (SYMBICORT) 160-4.5 MCG/ACT inhaler    methylPREDNISolone (MEDROL DOSEPAK) 4 MG tablet    STUDY TYPE: NPSG/CPAP/TCM  SLEEP AID: 5mg Ambien  PAP ACCLIMATION: good, trialed with wisp large and medium f-10 ff mask. Not interested in the nasal pillow.  RESPIRATORY EVENTS: obstructive hypopneas, apneas and rera s resulting in an AHI=85  ECG: Baseline Heart Rate= 78 BPM/NSR with PVC s  SPO2=baseline=94 %, Sincere=83%  SNORING: moderate  TCO2 MONITORING: ABG=43 mmHg, Baseline=51mmHg, Zenith=53 mmHg, treated=49mmHg  SUPPLEMENTAL 02=n/a   HOB ELEVATION=head approximately 35 degrees, legs 20 degrees as at home  TITRATION: using ff f-10 medium at 4cm H20 and increasing for comfort, hypopneas and snore to 10cm H20 and increased to 13cm H20 for obstructions and hypopneas rem supine with bed elevated as at home. Pressure decreased to encourage sleep onset. Trial of bilevel  (19/13) for pt comfort, continued hypopneas, obstructions and desats rem supine. Titration incomplete.  LEAK RATE=0-10--variable  SLEEP STAGES: NREM prior to cpap  MOVEMENT: no  NOTES: Following cpap trial pt had vertigo for moments. Sp02=96% NP=615 BPM

## 2017-01-12 NOTE — PROGRESS NOTES
SUBJECTIVE:    Pt is a 56 year old female with pmh of     Patient Active Problem List   Diagnosis     Primary localized osteoarthrosis, pelvic region and thigh     Adjustment disorder with depressed mood     Tobacco use disorder     Morbid obesity (H)     Arthritis of knee, degenerative     Degeneration of cervical intervertebral disc     CYRUS (obstructive sleep apnea)     Elevated blood pressure (not hypertension)       who is here for evaluation of had concerns including Results and Dizziness.    Here in f/u.  Since last here, several changes.  One, new since last night, no head trauma, gets vertigo w/ position change not with nausea.  Two, last week several days painless blood on toilet paper, stool itself not black/bloody (she is RN with inpt experience), not for last few days  Three, SANTOS, better not gone, in retrospect she thinks building for a year or so, gradually worse. Discussed echo, not great pictures, perhaps could have diastolic dyscn causing sx.  Discussed could do leg u/s, CT PE, stress test, she declined last visit but now will do stress test.  Four, edema which began later 2016, not better on Lasix but much better on pred burst; once gone it came back, now on second burst, and much better edema.  Five, CYRUS, w/u underway likely explains high hgb  Six, pain arms, CTS per EMG, she is puzzled why acute onset; could not tolerate splints at first will retry sees Dr Saxena soon. Normal TSH, not diabetic, Lyme initial test abnl final test normal and finishing doxy  W/o better.   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 Will check CCP for  RF.  Nine, obese, once w/u done will discuss seeing Dr Yogesh Montoya for baseline colonoscopy    Allergies   Allergen Reactions     Adhesive Tape Blisters     Erythromycin Rash     Tearful describing stress of this; she does have psychiatrist she sees routinely        Current Outpatient Prescriptions   Medication Sig Dispense Refill     methylPREDNISolone (MEDROL DOSEPAK) 4 MG tablet Take 2 tablets (8 mg) by mouth See Admin Instructions follow package directions 21 tablet 0     baclofen (LIORESAL) 10 MG tablet Take 1 tablet (10 mg) by mouth 3 times daily 90 tablet 1     doxycycline (VIBRAMYCIN) 100 MG capsule Take 1 capsule (100 mg) by mouth 2 times daily 28 capsule 0     SERTRALINE HCL PO Take 100 mg by mouth daily        furosemide (LASIX) 20 MG tablet Take 1 tablet (20 mg) by mouth daily 30 tablet 0     albuterol (PROAIR HFA/PROVENTIL HFA/VENTOLIN HFA) 108 (90 BASE) MCG/ACT Inhaler Inhale 2 puffs into the lungs every 6 hours as needed for shortness of breath / dyspnea or wheezing 1 Inhaler 0     fluocinonide (LIDEX) 0.05 % ointment Apply topically daily To hands and feet when dermatitis active 60 g 2     budesonide-formoterol (SYMBICORT) 160-4.5 MCG/ACT inhaler Inhale 2 puffs into the lungs 2 times daily 1 Inhaler 1       Social History   Substance Use Topics     Smoking status: Current Every Day Smoker -- 0.75 packs/day for 33 years     Types: Cigarettes     Start date: 01/01/1982     Smokeless tobacco: Never Used     Alcohol Use: 0.0 oz/week     0 Standard drinks or equivalent per week      Comment: OCCASSIONALLY 2 DRINKS PER MONTH           OBJECTIVE:  /93 mmHg  Pulse 97  Wt 180.532 kg (398 lb)  GENERAL APPEARANCE: Alert, no acute distress  RESP: lungs clear to auscultation   CV: normal rate, regular rhythm, no murmur or gallop  MS: Tr edema legs  NEURO: Alert, oriented, speech and mentation normal  PSYCHE: mentation appears normal, affect and mood normal    ASSESSMENT/PLAN:    Arm pain: likely CTS,  unclear if underlying trigger. Retry splints while await Dr Saxena visit  CCP pending, will discuss w/ rheum if abnl  SANTOS: GXT, cardio appt. Also see pulm again (see 2015 note, rvwd).  Edema: unclear why seemingly improves on pred burst--await above rheum labs, monitor  Depression per psychiatry  Vertigo: monitor, new, if ongoing might need MR/ENT visit, lytes normal.   Rectal bleed: cbc normal, get colonoscoy  If no clear causes for multiple sx found, if ongoing, will discuss w/ rheum and/or ID  50 min visit over half couns/coord care discussing multiple sx eval/treat options--she also knows will start BP meds soon      AMANDA JULIEN MD

## 2017-01-12 NOTE — MR AVS SNAPSHOT
After Visit Summary   1/12/2017    Shira Rodriguez    MRN: [id number]           Patient Information     Date Of Birth          1960        Visit Information        Provider Department      1/12/2017 1:35 PM Anjel Busby MD [facility]        Today's Diagnoses     SANTOS (dyspnea on exertion)    -  1       Care Instructions    Primary Care Center Medication Refill Request Information:  * Please contact your pharmacy regarding ANY request for medication refills.  ** [facility] Prescription Fax = [phone]  * Please allow 3 business days for routine medication refills.  * Please allow 5 business days for controlled substance medication refills.     Primary Care Center Test Result notification information:  *You will be notified with in 7-10 days of your appointment day regarding the results of your test.  If you are on MyChart you will be notified as soon as the provider has reviewed the results and signed off on them.    Please schedule the following appointments:  Cardiovascular [phone] (3rd Floor [facility])   Lung Science [phone] (3rd Floor [facility])          Follow-ups after your visit        Additional Services     CARDIOLOGY EVAL ADULT REFERRAL       Your provider has referred you to:  [facility]: [facility] [phone]   [email or web address]    Please be aware that coverage of these services is subject to the terms and limitations of your health insurance plan.  Call member services at your health plan with any benefit or coverage questions.      Type of Referral:  New Cardiology Consult    Timeframe requested:  Less than 1 week    Please bring the following to your appointment:  >>   Any x-rays, CTs or MRIs which have been performed.  Contact the facility where they were done to arrange for  prior to your scheduled appointment.    >>   List of current  medications  >>   This referral request   >>   Any documents/labs given to you for this referral            PULMONARY MEDICINE REFERRAL       Your provider has referred you to: [facility] [place] [phone]   [email or web address]    F/u Dr Richardson    Please be aware that coverage of these services is subject to the terms and limitations of your health insurance plan.  Call member services at your health plan with any benefit or coverage questions.      Please bring the following with you to your appointment:    (1) Any X-Rays, CTs or MRIs which have been performed.  Contact the facility where they were done to arrange for  prior to your scheduled appointment.    (2) List of current medications   (3) This referral request   (4) Any documents/labs given to you for this referral                  Follow-up notes from your care team     Return in about 1 week (around 1/19/2017).      Your next 10 appointments already scheduled     Jan 16, 2017  9:00 AM   (Arrive by 8:45 AM)   New Patient Visit with Brigette Valadez MD   [facility] ([facility])    [address]  3rd Floor  [facility] [address] [phone]            Jan 16, 2017 11:30 AM   NM INJECTION with [email or web address]   [facility], Nuclear Medicine ([facility])    [address] [address] [phone]            Jan 16, 2017 12:15 PM   NM SCAN3 with UUNM1   [facility], Nuclear Medicine ([facility])    [address] [address] [phone]            Jan 16, 2017 12:45 PM   Ekg Stress Nm Adenosine with UUEKGNMS   [facility] ([facility])    [address] [place] [id number]               Jan 16, 2017  1:45 PM   NM MPI ADENOSINE with  UUNM1   [facility] [place], Nuclear Medicine ([facility] [place])    [address] [address] [phone]           For a ONE day exam: Allow 3-4 hours for test. For a TWO day exam: Allow 2 hours PER day for test.  You may need to stop some medicines before the test. Follow your doctor s orders. - If you take a beta blocker: Follow your doctor s specific instructions on taking it prior to and on the day of your exam. - If you take Aggrenox or dipyridamole (Persantine, Permole), stop taking it 48 hours before your test. - If you take Viagra, Cialis or Levitra, stop taking it 48 hours before your test. - If you take theophylline or aminophylline, stop taking it 12 hours before your test.  For patients with diabetes: - If you take insulin, call your diabetes care team. Ask if you should take a 1/2 dose the morning of your test. - If you take diabetes medicine by mouth, don t take it on the morning of your test. Bring it with you to take after the test. (If you have questions, call your diabetes care team.)  Do not take nitrates on the day of your test. Do not wear your Nitro-Patch.  Stop all caffeine 12 hours before the test. This includes coffee, tea, soda pop, chocolate and certain medicines (such as Anacin, Excedrin and NoDoz). Also avoid decaf coffee and tea, as these contain small amounts of caffeine.  No alcohol, smoking or other tobacco for 12 hours before the test.  Stop eating 3 hours before the test. You may drink water.  Please wear a loose two-piece outfit. If you will have an exercise test, bring rubber-soled walking shoes.  When you arrive, please tell us if you: - Have diabetes - Are breastfeeding - May be pregnant - Have a pacemaker of ICD (implantable defibrillator).  Please call your Imaging Department at your exam site with any questions.            Jan 17, 2017  9:30 AM   Return Sleep Patient with ZEKE Jarquin   [facility]  [facility])    [address]  [facility] [address]   [phone]            Jan 17, 2017  4:00 PM   (Arrive by 3:45 PM)   New Patient Visit with Tashi Saxena MD   [facility] ([facility])    [address]  4th Floor  [facility] [address] [phone]            Jan 19, 2017  3:05 PM   (Arrive by 2:50 PM)   Return Visit with Anjel Busby MD   [facility] ([facility])    [address]  4th Floor  [facility] [id number] [phone]            Mar 06, 2017  6:30 PM   (Arrive by 6:15 PM)   Return Visit with Georgina Richardson MD   [facility]    [address] [place] [address]  3rd Floor  [facility] [id number] [phone]              Future tests that were ordered for you today     Open Future Orders        Priority Expected Expires Ordered    NM Adenosine stress test Routine  1/12/2018 1/12/2017    Cyclic Citrullinated Peptide Antibody IgG Routine  1/13/2018 1/12/2017    Basic metabolic panel Routine  1/12/2018 1/12/2017    CBC with platelets differential Routine  1/12/2018 1/12/2017            Who to contact     Please call your clinic at [phone] to:    Ask questions about your health    Make or cancel appointments    Discuss your medicines    Learn about your test results    Speak to your doctor   If you have compliments or concerns about an experience at your clinic, or if you wish to file a complaint, please contact [facility] Patient Relations at [phone] or email us at [email or web address]         Additional Information About Your Visit        MyChart Information     [employer] gives you secure access to your electronic health record. If you see a primary care provider, you can also send messages to your care team and make appointments. If you have  questions, please call your primary care clinic.  If you do not have a primary care provider, please call [phone] and they will assist you.      [employer] is an electronic gateway that provides easy, online access to your medical records. With [employer], you can request a clinic appointment, read your test results, renew a prescription or communicate with your care team.     To access your existing account, please contact your [facility] or call [phone] for assistance.        Care EveryWhere ID     This is your Care EveryWhere ID. This could be used by other organizations to access your [facility] medical records  [id number]        Your Vitals Were     Pulse                   97            Blood Pressure from Last 3 Encounters:   01/12/17 169/93   01/04/17 154/94   12/30/16 154/79    Weight from Last 3 Encounters:   01/12/17 180.532 kg (398 lb)   01/04/17 181.892 kg (401 lb)   12/30/16 181.892 kg (401 lb)              We Performed the Following     CARDIOLOGY EVAL ADULT REFERRAL     PULMONARY MEDICINE REFERRAL        Primary Care Provider Office Phone # Fax #    Anjel Busby [phone]       PRIMARY CARE CENTER [address] [place] [id number]        Thank you!     Thank you for choosing [facility]  for your care. Our goal is always to provide you with excellent care. Hearing back from our patients is one way we can continue to improve our services. Please take a few minutes to complete the written survey that you may receive in the mail after your visit with us. Thank you!             Your Updated Medication List - Protect others around you: Learn how to safely use, store and throw away your medicines at www.disposemymeds.org.          This list is accurate as of: 1/12/17  3:21 PM.  Always use your most recent med list.                   Brand Name Dispense Instructions for use    albuterol 108 (90 BASE) MCG/ACT Inhaler     PROAIR HFA/PROVENTIL HFA/VENTOLIN HFA    1 Inhaler    Inhale 2 puffs into the lungs every 6 hours as needed for shortness of breath / dyspnea or wheezing       baclofen 10 MG tablet    LIORESAL    90 tablet    Take 1 tablet (10 mg) by mouth 3 times daily       budesonide-formoterol 160-4.5 MCG/ACT Inhaler    SYMBICORT    1 Inhaler    Inhale 2 puffs into the lungs 2 times daily       doxycycline 100 MG capsule    VIBRAMYCIN    28 capsule    Take 1 capsule (100 mg) by mouth 2 times daily       fluocinonide 0.05 % ointment    LIDEX    60 g    Apply topically daily To hands and feet when dermatitis active       furosemide 20 MG tablet    LASIX    30 tablet    Take 1 tablet (20 mg) by mouth daily       methylPREDNISolone 4 MG tablet    MEDROL DOSEPAK    21 tablet    Take 2 tablets (8 mg) by mouth See Admin Instructions follow package directions       SERTRALINE HCL PO      Take 100 mg by mouth daily

## 2017-01-12 NOTE — NURSING NOTE
Chief Complaint   Patient presents with     Results     Pt states she is here to go over results.      Dizziness     Pt states she has been experiencing dizziness upon sitting up or standing.      Ana Gale LPN January 12, 2017 1:42 PM

## 2017-01-13 ENCOUNTER — PRE VISIT (OUTPATIENT)
Dept: CARDIOLOGY | Facility: CLINIC | Age: 57
End: 2017-01-13

## 2017-01-13 ENCOUNTER — DOCUMENTATION ONLY (OUTPATIENT)
Dept: SLEEP MEDICINE | Facility: CLINIC | Age: 57
End: 2017-01-13

## 2017-01-13 DIAGNOSIS — R06.00 DYSPNEA: ICD-10-CM

## 2017-01-13 NOTE — PROCEDURES
"SLEEP STUDY INTERPRETATION  SPLIT-NIGHT STUDY      Patient: XAVIER ROWELL  YOB: 1960  Study Date: 1/11/2017  MRN: [id number]  Referring Provider: Néstor Busby MD  Ordering Provider: Adelaide Livingston PA-c    Indications for Polysomnography: The patient is a 56 y old Female who is 5' 6\" and weighs 401.0 lbs.  Her BMI is 65.2, Avella sleepiness scale is 11.0 and neck size is 43.0.  Relevant medical history includes depression, morbid obesity, numbness and tingling in hands (MRI cervical spine normal), moderate CYRUS.  A diagnostic polysomnogram was performed to evaluate for probable worsening of CYRUS with interim ~100 lb weight gain and potential for obesity hypoventilation.  After 126.0 minutes of sleep time the patient exhibited sufficient respiratory events qualifying her for a CPAP trial which was then initiated.      Polysomnogram Data:  A full night polysomnogram recorded the standard physiologic parameters including EEG, EOG, EMG, ECG, nasal and oral airflow.  Respiratory parameters of chest and abdominal movements were recorded with respiratory inductance plethysmography.  Oxygen saturation was recorded by pulse oximetry.      Diagnostic PSG  Sleep Architecture:   The total recording time of the diagnostic portion of the study was 162.5 minutes.  The total sleep time was 126.0 minutes.  During the diagnostic portion of the study the sleep latency was mildly increased at 24.4 minutes with Zolpidem 5mg.  REM latency was - minutes.  Arousal index was increased at 29.5 arousals per hour.  Sleep efficiency was mildly decreased at 77.6%.  Wake after sleep onset was 1.5 minutes.   The patient spent 7.5% of total sleep time in Stage N1, 69.0% in Stage N2, 23.4% in Stage N3 and 0.0% in REM.       Respiration: Severe CYRUS with sleep-associated hypoxemia    Events - During the diagnostic portion of the study, the polysomnogram revealed a presence of 10 obstructive, - central, and - mixed apneas resulting in " an apnea index of 4.8 events per hour.  There were 159 hypopneas resulting in a hypopnea index of 75.7 events per hour.  The combined apnea/hypopnea index was 80.5 events per hour.  The REM AHI was - events per hour.  The supine AHI was 80.5 events per hour.  The RERA index was 10.0 events per hour.  The RDI was 90.5 events per hour.     Snoring - was reported as moderate.    Respiratory rate and pattern - was notable for normal respiratory rate and pattern.    Sustained Sleep Associated Hypoventilation - Transcutaneous carbon dioxide monitoring was used, however significant hypoventilation was not present with a maximum change of ~3 mmHg.  Pre-study ABG was consistent with hypoxemia (pH 7.39, pCO2 43, pO2 74, HCO3 26).    Sleep Associated Hypoxemia - (Greater than 5 minutes O2 sat below 89%) was present.  Baseline oxygen saturation was 91.3%. Lowest oxygen saturation was 82.9%.  Time spent less than or equal to 88% was 33.6 minutes.  Time spent less than or equal to 89% was 61.3 minutes.  90.5 10.0 80.5     Treatment PSG  Sleep Architecture:   At 01:29:18 AM the patient was placed on CPAP treatment and was titrated at pressures ranging from 4 cmH2O up to 19/13/0 cmH2O.  The total recording time of the treatment portion of the study was 311.9 minutes.  The total sleep time was 230.0 minutes.  During the treatment portion of the study the sleep latency was 15.0 minutes.  REM latency was 117.5 minutes.  Arousal index was normal at 21.1 arousals per hour.  Sleep efficiency was mildly decreased at 73.7%.  Wake after sleep onset was 66.5 minutes.  The patient spent 14.3% of total sleep time in Stage N1, 43.5% in Stage N2, 13.7% in Stage N3 and 28.5% in REM.       Respiration: CPAP titrated to zenith of 14 cm H2O and was effective in supine NREM, but seen to have residual obstructive events and sustained hypoxemia in the mid- to high-80 s in supine REM.  Transitioned to bilevel PAP and at end of study, bilevel 19/13 cm  H2O spontaneous appeared effective in brief period of supine REM.  TCM remained stable.  This titration was considered adequate (residual AHII with 75% decrease, or above constraints without REM-supine sleep at final pressure).    Movement Activity: Very rare PLM s    Periodic Limb Movements  o During the diagnostic portion of the study, there were - PLMs recorded. The PLM index was - movements per hour.  The PLM Arousal Index was - per hour.  o During the treatment portion of the study, there were 9 PLMs recorded. The PLM index was 2.3 movements per hour.  The PLM Arousal Index was 0.8 per hour.    REM EMG Activity - Excessive transient / sustained muscle activity was not present.    Nocturnal Behavior - Abnormal sleep related behaviors were not noted during / arising out of NREM / REM sleep.    Bruxism - None apparent.    Cardiac Summary: Appears NSR  During the diagnostic portion of the study, the average pulse rate was 80.6 bpm.  The minimum pulse rate was 62.4 bpm while the maximum pulse rate was 102.1 bpm.    During the treatment portion of the study, the average pulse rate was 75.0 bpm.  The minimum pulse rate was 51.0 bpm while the maximum pulse rate was 98.7 bpm.     Arrhythmias were not noted.    Assessment:     Severe CYRUS with sleep-associated hypoxemia and probable REM hypoventilation    CPAP titrated to zenith of 14 cm H2O and was effective in supine NREM, but seen to have residual obstructive events and sustained hypoxemia in the mid- to high-80 s in supine REM.  Transitioned to bilevel PAP and at end of study, bilevel 19/13 cm H2O spontaneous appeared effective in brief period of supine REM.  TCM remained stable.    Recommendations:    Consider treatment of CYRUS with bilevel PAP at 19/13 cm H2O in spontaneous mode.  Recommend clinical follow up with sleep management team, for coaching and review of effectiveness and measures.    Could consider all-night bilevel PAP titration PSG.    Weight management  (if BMI > 30).    Diagnostic Codes:    Obstructive Sleep Apnea G47.33    Sleep Hypoxemia/Hypoventilation G47.36                  Table of Oximetry Distribution    Range(%) Time in range (min) Time in range (%) Time in or below range (min) Time in or below range (%)   0.0 - 88.0 33.6 7.2% 33.6 7.2%   0.0 - 89.0 61.3 13.1% 61.3 13.1%

## 2017-01-13 NOTE — TELEPHONE ENCOUNTER
Previsit nursing summary    HPI: Referred from Dr. Busby for dyspnea on exertion, has had some lower extremity edema, elevated BP, morbidly obese.    Procedures:    ECHO (12/30/2016)  Interpretation Summary  Technically difficult study.  Global and regional left ventricular function is normal with an EF of 55-60%.  Moderate concentric left ventricular hypertrophy.  Right ventricular function, chamber size, wall motion, and thickness are  normal.  The IVC is normal. The estimated right atrial pressure is < 5 mmHg.  No pericardial effusion is present.  Previous study not available for comparison.

## 2017-01-14 ASSESSMENT — ENCOUNTER SYMPTOMS
EXERCISE INTOLERANCE: 0
LOSS OF CONSCIOUSNESS: 0
NECK PAIN: 0
LIGHT-HEADEDNESS: 1
TACHYCARDIA: 0
WEAKNESS: 0
LEG SWELLING: 1
MEMORY LOSS: 0
POLYDIPSIA: 0
SNORES LOUDLY: 1
MUSCLE WEAKNESS: 0
INCREASED ENERGY: 0
BACK PAIN: 1
DISTURBANCES IN COORDINATION: 0
DIZZINESS: 1
HEADACHES: 1
HALLUCINATIONS: 0
JOINT SWELLING: 0
PARALYSIS: 0
SPUTUM PRODUCTION: 1
NUMBNESS: 1
WEIGHT LOSS: 0
SHORTNESS OF BREATH: 1
ORTHOPNEA: 0
COUGH: 1
PANIC: 0
RESPIRATORY PAIN: 0
POLYPHAGIA: 0
HYPOTENSION: 0
SPEECH CHANGE: 0
MUSCLE CRAMPS: 0
FEVER: 0
SYNCOPE: 0
DECREASED APPETITE: 0
NERVOUS/ANXIOUS: 1
MYALGIAS: 0
FATIGUE: 1
ARTHRALGIAS: 1
POSTURAL DYSPNEA: 0
INSOMNIA: 1
DYSPNEA ON EXERTION: 1
TREMORS: 0
PALPITATIONS: 0
HEMOPTYSIS: 0
DEPRESSION: 1
ALTERED TEMPERATURE REGULATION: 0
STIFFNESS: 1
WEIGHT GAIN: 0
TINGLING: 1
SLEEP DISTURBANCES DUE TO BREATHING: 0
LEG PAIN: 0
WHEEZING: 1
NIGHT SWEATS: 0
CHILLS: 0
CLAUDICATION: 0
HYPERTENSION: 1
SEIZURES: 0
COUGH DISTURBING SLEEP: 0
DECREASED CONCENTRATION: 0

## 2017-01-16 ENCOUNTER — OFFICE VISIT (OUTPATIENT)
Dept: CARDIOLOGY | Facility: CLINIC | Age: 57
End: 2017-01-16
Attending: INTERNAL MEDICINE
Payer: COMMERCIAL

## 2017-01-16 VITALS
SYSTOLIC BLOOD PRESSURE: 162 MMHG | HEIGHT: 66 IN | OXYGEN SATURATION: 93 % | BODY MASS INDEX: 47.09 KG/M2 | DIASTOLIC BLOOD PRESSURE: 101 MMHG | HEART RATE: 98 BPM | WEIGHT: 293 LBS

## 2017-01-16 DIAGNOSIS — E66.01 MORBID OBESITY, UNSPECIFIED OBESITY TYPE (H): Chronic | ICD-10-CM

## 2017-01-16 DIAGNOSIS — I10 ESSENTIAL HYPERTENSION: Primary | ICD-10-CM

## 2017-01-16 DIAGNOSIS — R06.09 EXERTIONAL DYSPNEA: ICD-10-CM

## 2017-01-16 DIAGNOSIS — Z91.89 CANDIDATE FOR STATIN THERAPY DUE TO RISK OF FUTURE CARDIOVASCULAR EVENT: ICD-10-CM

## 2017-01-16 PROCEDURE — 93010 ELECTROCARDIOGRAM REPORT: CPT | Mod: ZP | Performed by: INTERNAL MEDICINE

## 2017-01-16 PROCEDURE — 99213 OFFICE O/P EST LOW 20 MIN: CPT | Mod: ZF

## 2017-01-16 PROCEDURE — 99204 OFFICE O/P NEW MOD 45 MIN: CPT | Mod: GC | Performed by: INTERNAL MEDICINE

## 2017-01-16 PROCEDURE — 93005 ELECTROCARDIOGRAM TRACING: CPT | Mod: ZF

## 2017-01-16 RX ORDER — ATORVASTATIN CALCIUM 20 MG/1
20 TABLET, FILM COATED ORAL DAILY
Qty: 90 TABLET | Refills: 3 | Status: SHIPPED | OUTPATIENT
Start: 2017-01-16 | End: 2018-03-05

## 2017-01-16 RX ORDER — LISINOPRIL 10 MG/1
10 TABLET ORAL DAILY
Qty: 90 TABLET | Refills: 3 | Status: SHIPPED | OUTPATIENT
Start: 2017-01-16 | End: 2018-01-16

## 2017-01-16 ASSESSMENT — PAIN SCALES - GENERAL: PAINLEVEL: NO PAIN (0)

## 2017-01-16 NOTE — MR AVS SNAPSHOT
After Visit Summary   1/16/2017    Shira Rodriguez    MRN: [id number]           Patient Information     Date Of Birth          1960        Visit Information        Provider Department      1/16/2017 9:00 AM Brigette Valadez MD [facility]        Today's Diagnoses     Essential hypertension    -  1     Dyspnea         Hyperlipidemia with target LDL less than 100           Care Instructions    PATIENT INSTRUCTIONS:  1. Follow up with Dr. Valadez in 4 weeks   Arrange for the stress test when you see us  2. Maintain a cardiac healthy diet  3. Medication change:    Lisinopril 10 mg by mouth daily   Asprin 81 mg by mouth daily   Lipitor 20 mg by mouth every evening        Janki Flores RN  Nurse Coordinator  Phone number [phone]  Please use CallVU to communicate with your HealthCare Provider      If you have an urgent need after hours (8:00 am to 4:30 pm) please call [phone] and ask for the cardiology fellow on call.              Follow-ups after your visit        Follow-up notes from your care team     Return in about 4 weeks (around 2/13/2017), or if symptoms worsen or fail to improve, for Dr. Valadez for hypertension.      Your next 10 appointments already scheduled     Jan 17, 2017  9:30 AM   Return Sleep Patient with ZEKE Jarquin   [facility] ([facility]    [address] [facility] [address]   [phone]            Jan 17, 2017  4:00 PM   (Arrive by 3:45 PM)   New Patient Visit with Tashi Saxena MD   [facility] ([facility])    [address] [place] [address]  [address] [facility] [id number] [phone]            Jan 19, 2017  3:05 PM   (Arrive by 2:50 PM)   Return Visit with Anjel Busby MD   [facility] ([facility])    [address] [place] [address]  4th [facility] [id number] [phone]            Feb 20, 2017 10:00 AM   NM INJECTION  with [email or web address]   [facility] ([facility])    [address] [facility] [id number] [phone]            Feb 20, 2017 10:45 AM   NM SCAN3 with [email or web address]   [facility] ([facility])    [address] [facility] [id number] [phone]            Feb 20, 2017 11:15 AM   Ekg Stress Nm Adenosine with UUEKGNMS   [facility] ([facility])    [address] [facility] [address]               Feb 20, 2017 12:15 PM   NM MPI ADENOSINE with UUNM1   [facility] ([facility])    [address] [facility] [id number] [phone]           For a ONE day exam: Allow 3-4 hours for test. For a TWO day exam: Allow 2 hours PER day for test.  You may need to stop some medicines before the test. Follow your doctor s orders. - If you take a beta blocker: Follow your doctor s specific instructions on taking it prior to and on the day of your exam. - If you take Aggrenox or dipyridamole (Persantine, Permole), stop taking it 48 hours before your test. - If you take Viagra, Cialis or Levitra, stop taking it 48 hours before your test. - If you take theophylline or aminophylline, stop taking it 12 hours before your test.  For patients with diabetes: - If you take insulin, call your diabetes care team. Ask if you should take a 1/2 dose the morning of your test. - If you take diabetes medicine by mouth, don t take it on the morning of your test. Bring it with you to take after the test. (If you have questions, call your diabetes care team.)  Do not take nitrates on the day of your test. Do not wear your Nitro-Patch.  Stop all caffeine 12 hours before the test. This includes coffee, tea, soda pop, chocolate and certain medicines (such as Anacin, Excedrin and NoDoz).  Also avoid decaf coffee and tea, as these contain small amounts of caffeine.  No alcohol, smoking or other tobacco for 12 hours before the test.  Stop eating 3 hours before the test. You may drink water.  Please wear a loose two-piece outfit. If you will have an exercise test, bring rubber-soled walking shoes.  When you arrive, please tell us if you: - Have diabetes - Are breastfeeding - May be pregnant - Have a pacemaker of ICD (implantable defibrillator).  Please call your Imaging Department at your exam site with any questions.            Feb 20, 2017  1:00 PM   (Arrive by 12:45 PM)   Return Visit with Brigette Valadez MD   [facility] ([facility])    [address] [place] [id number] [phone]            Mar 06, 2017  6:30 PM   (Arrive by 6:15 PM)   Return Visit with Georgina Richardson MD   [facility] ([facility])    [address] [place] [address] [phone]              Who to contact     If you have questions or need follow up information about today's clinic visit or your schedule please contact [facility] directly at [phone].  Normal or non-critical lab and imaging results will be communicated to you by 100Plushart, letter or phone within 4 business days after the clinic has received the results. If you do not hear from us within 7 days, please contact the clinic through 100Plushart or phone. If you have a critical or abnormal lab result, we will notify you by phone as soon as possible.  Submit refill requests through Segetis or call your pharmacy and they will forward the refill request to us. Please allow 3 business days for your refill to be completed.          Additional Information About Your Visit        Segetis Information     Segetis gives you secure access to your electronic health record. If you see a primary care provider, you can also send  "messages to your care team and make appointments. If you have questions, please call your primary care clinic.  If you do not have a primary care provider, please call [phone] and they will assist you.        Care EveryWhere ID     This is your Care EveryWhere ID. This could be used by other organizations to access your [facility] medical records  [id number]        Your Vitals Were     Pulse Height BMI (Body Mass Index) Pulse Oximetry          105 1.676 m (5' 6\") 64.22 kg/m2 93%         Blood Pressure from Last 3 Encounters:   01/16/17 162/93   01/12/17 169/93   01/04/17 154/94    Weight from Last 3 Encounters:   01/16/17 180.396 kg (397 lb 11.2 oz)   01/12/17 180.532 kg (398 lb)   01/04/17 181.892 kg (401 lb)              We Performed the Following     EKG 12-lead, tracing only (Future)          Today's Medication Changes          These changes are accurate as of: 1/16/17 10:52 AM.  If you have any questions, ask your nurse or doctor.               Start taking these medicines.        Dose/Directions    atorvastatin 20 MG tablet   Commonly known as:  LIPITOR   Used for:  Hyperlipidemia with target LDL less than 100   Started by:  Brigette Valadez MD        Dose:  20 mg   Take 1 tablet (20 mg) by mouth daily   Quantity:  90 tablet   Refills:  3       lisinopril 10 MG tablet   Commonly known as:  PRINIVIL/ZESTRIL   Used for:  Essential hypertension   Started by:  Brigette Valadez MD        Dose:  10 mg   Take 1 tablet (10 mg) by mouth daily   Quantity:  90 tablet   Refills:  3            Where to get your medicines      These medications were sent to [id number] - [place], [place] - [facility] [facility] [address]  [address] [facility] [address], [place] [address]    Hours:  24-hours Phone:  [phone]    - atorvastatin 20 MG tablet  - lisinopril 10 MG tablet             Primary Care Provider Office Phone # Fax #    Anjel Busby [phone]       PRIMARY CARE " [address]  [facility] [address]        Thank you!     Thank you for choosing [facility]  for your care. Our goal is always to provide you with excellent care. Hearing back from our patients is one way we can continue to improve our services. Please take a few minutes to complete the written survey that you may receive in the mail after your visit with us. Thank you!             Your Updated Medication List - Protect others around you: Learn how to safely use, store and throw away your medicines at www.disposemymeds.org.          This list is accurate as of: 1/16/17 10:52 AM.  Always use your most recent med list.                   Brand Name Dispense Instructions for use    albuterol 108 (90 BASE) MCG/ACT Inhaler    PROAIR HFA/PROVENTIL HFA/VENTOLIN HFA    1 Inhaler    Inhale 2 puffs into the lungs every 6 hours as needed for shortness of breath / dyspnea or wheezing       atorvastatin 20 MG tablet    LIPITOR    90 tablet    Take 1 tablet (20 mg) by mouth daily       baclofen 10 MG tablet    LIORESAL    90 tablet    Take 1 tablet (10 mg) by mouth 3 times daily       budesonide-formoterol 160-4.5 MCG/ACT Inhaler    SYMBICORT    1 Inhaler    Inhale 2 puffs into the lungs 2 times daily       fluocinonide 0.05 % ointment    LIDEX    60 g    Apply topically daily To hands and feet when dermatitis active       furosemide 20 MG tablet    LASIX    30 tablet    Take 1 tablet (20 mg) by mouth daily       lisinopril 10 MG tablet    PRINIVIL/ZESTRIL    90 tablet    Take 1 tablet (10 mg) by mouth daily       SERTRALINE HCL PO      Take 100 mg by mouth daily

## 2017-01-16 NOTE — NURSING NOTE
"Chief Complaint   Patient presents with     New Patient     dyspnea on exertion/referral from Dr. Busby/EKG/NM stress post visit     /93 mmHg  Pulse 105  Ht 1.676 m (5' 6\")  Wt 180.396 kg (397 lb 11.2 oz)  BMI 64.22 kg/m2  SpO2 93%      Repeat Blood Pressure   BP Pulse Site Cuff Size Time Date   162/101 98 Right Arm Regular 09:09 AM 1/16/2017        No orthostatic vitals data filed.  No peak flow data filed.  No pain information filed.    "

## 2017-01-16 NOTE — NURSING NOTE
1. Follow up with Dr. Valadez in 4 weeks   Arrange for the stress test when you see us  2. Maintain a cardiac healthy diet  3. Medication change:    Lisinopril 10 mg by mouth daily   Asprin 81 mg by mouth daily   Lipitor 20 mg by mouth every evening

## 2017-01-16 NOTE — PROGRESS NOTES
HPI:   Ms. Kristel Rodriguez is a 56-year old lady with a PMHx of hypertension and obesity who presented after referral from Dr. Busby for evaluation of exertional dyspnea.    Ms. Rodriguez has had a number of issues in recent months that have prompted Cardiology referral. Her dyspnea is most troubling to her and has pregressed from NYHA 1 to NYHA 3 in the past 6-8 months. This has been in the context of a suspected diagnosis of OHS/CYRUS, for which Ms. Rodriguez underwent a sleep study last week. 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 She has also had PFTs in the past which revealed a predominantely restrictive defect. Her dyspnea symptoms prompted Dr. Busby to refer her to Cardiology and obtain a TTE, which revealed LVH, some diastolic dysfunction, and a preserved EF. Ms. Rodriguez notes the absence of PND and orthopnea though she has had peripheral edema in recent months. Ms. Rodriguez is taking regular low dose furosemide for this. The dyspnea is significantly limiting her lifestyle. Ms. Rodriguez has not had chest pain/tightness on exertion, fever/rigors, or cough/hemoptysis.     Ms. Rodriguez has low mood in relation to losing her job in 2016 combined with paresthesias of her fingers and hands, her sleeping difficulties, and her dyspnea. Ms. Rodriguez is concerned about starting her new job as a  at [employer] in view of her neuropathy symptoms. She has no prior knowledge of coronary artery disease, heart failure, valvular heart disease or arrhythmias. She is on a weight watchers diet and has lost some weight and recognizes that her is weight is her biggest problem. She is a active smoker and smokes a pack a day. She does not have diabetes or renal disease.       PAST MEDICAL HISTORY:  Past Medical History   Diagnosis Date     Secondary localized osteoarthrosis, pelvic region and thigh 7/12/04     Hospitalized     Contact dermatitis      Traumatic arthropathy       Eczema      HANDS     Osteoarthrosis      Knee pain, left      Cough 2015     History of total hip replacement 10/27/2011     SOB (shortness of breath) 2015       CURRENT MEDICATIONS:  Current Outpatient Prescriptions   Medication Sig Dispense Refill     methylPREDNISolone (MEDROL DOSEPAK) 4 MG tablet Take 2 tablets (8 mg) by mouth See Admin Instructions follow package directions 21 tablet 0     baclofen (LIORESAL) 10 MG tablet Take 1 tablet (10 mg) by mouth 3 times daily 90 tablet 1     SERTRALINE HCL PO Take 100 mg by mouth daily        furosemide (LASIX) 20 MG tablet Take 1 tablet (20 mg) by mouth daily 30 tablet 0     albuterol (PROAIR HFA/PROVENTIL HFA/VENTOLIN HFA) 108 (90 BASE) MCG/ACT Inhaler Inhale 2 puffs into the lungs every 6 hours as needed for shortness of breath / dyspnea or wheezing 1 Inhaler 0     fluocinonide (LIDEX) 0.05 % ointment Apply topically daily To hands and feet when dermatitis active 60 g 2     budesonide-formoterol (SYMBICORT) 160-4.5 MCG/ACT inhaler Inhale 2 puffs into the lungs 2 times daily 1 Inhaler 1       PAST SURGICAL HISTORY:  Past Surgical History   Procedure Laterality Date     C total hip arthroplasty  04     RT     Hip arthroscopy[       Arthroplasty knee  2012     Procedure: ARTHROPLASTY KNEE;  Left Total Knee Arthroplasty;  Surgeon: Annette Quesada MD;  Location: [facility] OR       ALLERGIES     Allergies   Allergen Reactions     Adhesive Tape Blisters     Erythromycin Rash       FAMILY HISTORY:  Father  when she was age 12. Potentially had MI   PGF with MI though ?age   Mother with HTN, TIA and stroke  Three paternal uncles with CABG, at least one in early 50s     SOCIAL HISTORY:  - Current smoker, smoked 30 years x 1 PPD  - Typically has less than one drink/month  - No illicit drugs      ROS:   Constitutional: No fever, chills, or sweats. No weight gain/loss   ENT: No visual disturbance, ear ache, epistaxis, sore throat  Allergies/Immunologic:  "Negative.   Respiratory: No cough, hemoptysia  Cardiovascular: As per HPI  GI: No nausea, vomiting, hematemesis, melena, or hematochezia  : No urinary frequency, dysuria, or hematuria  Integument: Negative  Psychiatric: Negative  Neuro: Negative  Endocrinology: Negative   Musculoskeletal: Negative    EXAM:  /93 mmHg  Pulse 105  Ht 1.676 m (5' 6\")  Wt 180.396 kg (397 lb 11.2 oz)  BMI 64.22 kg/m2  SpO2 93%  In general, the patient is a pleasant female in no apparent distress. Morbidly obese and tearful at times      HEENT: NC/AT.  PERRLA.  EOMI.  Sclerae white, not injected.    Neck: Carotids 2+ bilaterally without bruits.  No jugular venous distension. No thyromegaly.   Heart: RRR. Normal S1, S2. No murmur, rub, click, or gallop. There is no heave.    Lungs: Clear bilaterally.  No rhonchi, wheezes, rales.   Abdomen: Soft, nontender, nondistended.   Extremities: No edema.  The pulses are 2+at the radial and DP bilaterally.  Neuro: grossly non focal, gait normal.  Skin: no rashes.    Labs:  LIPID RESULTS:  Lab Results   Component Value Date    CHOL 192 09/24/2016    HDL 48* 09/24/2016    * 09/24/2016    TRIG 141 09/24/2016    CHOLHDLRATIO 4.5 06/01/2012    NHDL 144* 09/24/2016       LIVER ENZYME RESULTS:  Lab Results   Component Value Date    AST 20 12/20/2016    ALT 50 12/20/2016       CBC RESULTS:  Lab Results   Component Value Date    WBC 7.8 01/12/2017    RBC 5.62* 01/12/2017    HGB 16.7* 01/12/2017    HCT 50.3* 01/12/2017    MCV 90 01/12/2017    MCH 29.7 01/12/2017    MCHC 33.2 01/12/2017    RDW 13.9 01/12/2017     01/12/2017       BMP RESULTS:  Lab Results   Component Value Date     01/12/2017    POTASSIUM 4.1 01/12/2017    CHLORIDE 106 01/12/2017    CO2 27 01/12/2017    ANIONGAP 7 01/12/2017    * 01/12/2017    BUN 18 01/12/2017    CR 0.51* 01/12/2017    GFRESTIMATED >90  Non  GFR Calc   01/12/2017    GFRESTBLACK >90   GFR Calc   01/12/2017 "    MARIA INES 9.0 01/12/2017        A1C RESULTS:  No results found for: A1C    INR RESULTS:  Lab Results   Component Value Date    INR 1.31* 06/18/2012    INR 1.30* 06/17/2012       Procedures:  EKG 1/16/17:    NSR, Rate approx 95 bpm, normal axis, normal intervals. Poor R wave progression, ?pathologic Q waves in III and AVF.      ECHO (12/30/2016)  Technically difficult study.  Global and regional left ventricular function is normal with an EF of 55-60%.  Moderate concentric left ventricular hypertrophy.  Right ventricular function, chamber size, wall motion, and thickness are normal.  The IVC is normal. The estimated right atrial pressure is < 5 mmHg.  No pericardial effusion is present.  Previous study not available for comparison.      Assessment and Plan:   Mrs. Shira Rodriguez is a 56-year old lady with a past medical history of smoking and obesity who was referred  for evaluation of exertional dyspnea.    Mrs. Rodriguez's dyspnea is clearly multifactorial. However, her TTE reveals evidence of structural heart disease with uncontrolled hypertension in clinic today.     - Hypertension   - Start lisinopril 10 mg q24h    - Suspected diastolic heart failure/HFpEF; NYHA III, AHA/ACC stage C   - Start lisinopril 10 mg q24h   - Start ASA 81 mg q24h    - Advised regarding limiting salt intake, weight management, daily walking, and smoking cessation    - Await results of sleep study, which Mrs. Rodriguez will receive on 1/17 and pursue CPAP vs. NIPPV accordingly     - Dyslipidemia   - Start atorvastatin 20 mg q24h for an estimated 10-yr ASCVD risk of 9%      - Smoking   - At present, in pre-contemplative phase of smoking cessation and thus declined cessation intervention     Return to clinic in four weeks' time for evaluation of BP and dyspnea symptoms. While it is important to exclude obstructive CAD, she has no clear anginal symptoms. After discussion with patient, we recommend managing her risk factors aggressively as above  and arrange for stress test at a later point.       Staffed with Dr. Valadez.    Anselmo Barba  Cardiology Fellow     ATTENDING ATTESTATION:  This patient has been seen and examined by me January 16, 2017 with Dr. Barba. I have reviewed the vitals, laboratory and imaging data relevant to this patient's care. I have edited this note to reflect our joint assessment and plan, and discussed the plan with the patient.    Brigette Valadez MD, MS  Staff Cardiologist  Pager: [phone]        CC  Patient Care Team:  Anjel Busby MD as PCP - General (Family Practice)  Georgina Richardson MD as MD (Pulmonary)  Anjel Busby MD as MD (Family Practice)  Anjel Chris MD as MD (Family Medicine - Sports Medicine)  Tashi Saxena MD as MD (Family Medicine - Sports Medicine)  ANJEL BUSBY

## 2017-01-16 NOTE — PATIENT INSTRUCTIONS
PATIENT INSTRUCTIONS:  1. Follow up with Dr. Valadez in 4 weeks   Arrange for the stress test when you see us  2. Maintain a cardiac healthy diet  3. Medication change:    Lisinopril 10 mg by mouth daily   Asprin 81 mg by mouth daily   Lipitor 20 mg by mouth every evening        Janki Flores RN  Nurse Coordinator  Phone number [phone]  Please use ABS to communicate with your HealthCare Provider      If you have an urgent need after hours (8:00 am to 4:30 pm) please call [phone] and ask for the cardiology fellow on call.

## 2017-01-16 NOTE — Clinical Note
1/16/2017      RE: Shira Rodriguez  [address] [facility] [address]       Dear Colleague,    Thank you for the opportunity to participate in the care of your patient, Shira Rodriguez, at the [facility] at [facility]. Please see a copy of my visit note below.    HPI:   Ms. Kristel Rodriguez is a 56-year old lady with a PMHx of hypertension and obesity who presented after referral from Dr. Busby for evaluation of exertional dyspnea.    Ms. Rodriguez has had a number of issues in recent months that have prompted Cardiology referral. Her dyspnea is most troubling to her and has pregressed from NYHA 1 to NYHA 3 in the past 6-8 months. This has been in the context of a suspected diagnosis of OHS/CYRUS, for which Ms. Rodriguez underwent a sleep study last week. 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 She has also had PFTs in the past which revealed a predominantely restrictive defect. Her dyspnea symptoms prompted Dr. Busby to refer her to Cardiology and obtain a TTE, which revealed LVH, some diastolic dysfunction, and a preserved EF. Ms. Rodriguez notes the absence of PND and orthopnea though she has had peripheral edema in recent months. Ms. Rodriguez is taking regular low dose furosemide for this. The dyspnea is significantly limiting her lifestyle. Ms. Rodriguez has not had chest pain/tightness on exertion, fever/rigors, or cough/hemoptysis.     Ms. Rodriguez has low mood in relation to losing her job in 2016 combined with paresthesias of her fingers and hands, her sleeping difficulties, and her dyspnea. Ms. Rodriguez is concerned about starting her new job as a  at [employer] in view of her neuropathy symptoms. She has no prior knowledge of coronary artery disease, heart failure, valvular heart disease or arrhythmias. She is on a weight watchers diet and has lost some weight and recognizes that her is weight is her  biggest problem. She is a active smoker and smokes a pack a day. She does not have diabetes or renal disease.       PAST MEDICAL HISTORY:  Past Medical History   Diagnosis Date     Secondary localized osteoarthrosis, pelvic region and thigh 04     Hospitalized     Contact dermatitis      Traumatic arthropathy      Eczema      HANDS     Osteoarthrosis      Knee pain, left      Cough 2015     History of total hip replacement 10/27/2011     SOB (shortness of breath) 2015       CURRENT MEDICATIONS:  Current Outpatient Prescriptions   Medication Sig Dispense Refill     methylPREDNISolone (MEDROL DOSEPAK) 4 MG tablet Take 2 tablets (8 mg) by mouth See Admin Instructions follow package directions 21 tablet 0     baclofen (LIORESAL) 10 MG tablet Take 1 tablet (10 mg) by mouth 3 times daily 90 tablet 1     SERTRALINE HCL PO Take 100 mg by mouth daily        furosemide (LASIX) 20 MG tablet Take 1 tablet (20 mg) by mouth daily 30 tablet 0     albuterol (PROAIR HFA/PROVENTIL HFA/VENTOLIN HFA) 108 (90 BASE) MCG/ACT Inhaler Inhale 2 puffs into the lungs every 6 hours as needed for shortness of breath / dyspnea or wheezing 1 Inhaler 0     fluocinonide (LIDEX) 0.05 % ointment Apply topically daily To hands and feet when dermatitis active 60 g 2     budesonide-formoterol (SYMBICORT) 160-4.5 MCG/ACT inhaler Inhale 2 puffs into the lungs 2 times daily 1 Inhaler 1       PAST SURGICAL HISTORY:  Past Surgical History   Procedure Laterality Date     C total hip arthroplasty  04     RT     Hip arthroscopy[       Arthroplasty knee  2012     Procedure: ARTHROPLASTY KNEE;  Left Total Knee Arthroplasty;  Surgeon: Annette Quesada MD;  Location: [facility] OR       ALLERGIES     Allergies   Allergen Reactions     Adhesive Tape Blisters     Erythromycin Rash       FAMILY HISTORY:  Father  when she was age 12. Potentially had MI   PGF with MI though ?age   Mother with HTN, TIA and stroke  Three paternal uncles with  "CABG, at least one in early 50s     SOCIAL HISTORY:  - Current smoker, smoked 30 years x 1 PPD  - Typically has less than one drink/month  - No illicit drugs      ROS:   Constitutional: No fever, chills, or sweats. No weight gain/loss   ENT: No visual disturbance, ear ache, epistaxis, sore throat  Allergies/Immunologic: Negative.   Respiratory: No cough, hemoptysia  Cardiovascular: As per HPI  GI: No nausea, vomiting, hematemesis, melena, or hematochezia  : No urinary frequency, dysuria, or hematuria  Integument: Negative  Psychiatric: Negative  Neuro: Negative  Endocrinology: Negative   Musculoskeletal: Negative    EXAM:  /93 mmHg  Pulse 105  Ht 1.676 m (5' 6\")  Wt 180.396 kg (397 lb 11.2 oz)  BMI 64.22 kg/m2  SpO2 93%  In general, the patient is a pleasant female in no apparent distress. Morbidly obese and tearful at times      HEENT: NC/AT.  PERRLA.  EOMI.  Sclerae white, not injected.    Neck: Carotids 2+ bilaterally without bruits.  No jugular venous distension. No thyromegaly.   Heart: RRR. Normal S1, S2. No murmur, rub, click, or gallop. There is no heave.    Lungs: Clear bilaterally.  No rhonchi, wheezes, rales.   Abdomen: Soft, nontender, nondistended.   Extremities: No edema.  The pulses are 2+at the radial and DP bilaterally.  Neuro: grossly non focal, gait normal.  Skin: no rashes.    Labs:  LIPID RESULTS:  Lab Results   Component Value Date    CHOL 192 09/24/2016    HDL 48* 09/24/2016    * 09/24/2016    TRIG 141 09/24/2016    CHOLHDLRATIO 4.5 06/01/2012    NHDL 144* 09/24/2016       LIVER ENZYME RESULTS:  Lab Results   Component Value Date    AST 20 12/20/2016    ALT 50 12/20/2016       CBC RESULTS:  Lab Results   Component Value Date    WBC 7.8 01/12/2017    RBC 5.62* 01/12/2017    HGB 16.7* 01/12/2017    HCT 50.3* 01/12/2017    MCV 90 01/12/2017    MCH 29.7 01/12/2017    MCHC 33.2 01/12/2017    RDW 13.9 01/12/2017     01/12/2017       BMP RESULTS:  Lab Results   Component " Value Date     01/12/2017    POTASSIUM 4.1 01/12/2017    CHLORIDE 106 01/12/2017    CO2 27 01/12/2017    ANIONGAP 7 01/12/2017    * 01/12/2017    BUN 18 01/12/2017    CR 0.51* 01/12/2017    GFRESTIMATED >90  Non  GFR Calc   01/12/2017    GFRESTBLACK >90   GFR Calc   01/12/2017    MARIA INES 9.0 01/12/2017        A1C RESULTS:  No results found for: A1C    INR RESULTS:  Lab Results   Component Value Date    INR 1.31* 06/18/2012    INR 1.30* 06/17/2012       Procedures:  EKG 1/16/17:    NSR, Rate approx 95 bpm, normal axis, normal intervals. Poor R wave progression, ?pathologic Q waves in III and AVF.      ECHO (12/30/2016)  Technically difficult study.  Global and regional left ventricular function is normal with an EF of 55-60%.  Moderate concentric left ventricular hypertrophy.  Right ventricular function, chamber size, wall motion, and thickness are normal.  The IVC is normal. The estimated right atrial pressure is < 5 mmHg.  No pericardial effusion is present.  Previous study not available for comparison.      Assessment and Plan:   Mrs. Shira Rodriguez is a 56-year old lady with a past medical history of smoking and obesity who was referred  for evaluation of exertional dyspnea.    Mrs. Rodriguez's dyspnea is clearly multifactorial. However, her TTE reveals evidence of structural heart disease with uncontrolled hypertension in clinic today.     - Hypertension   - Start lisinopril 10 mg q24h    - Suspected diastolic heart failure/HFpEF; NYHA III, AHA/ACC stage C   - Start lisinopril 10 mg q24h   - Start ASA 81 mg q24h    - Advised regarding limiting salt intake, weight management, daily walking, and smoking cessation    - Await results of sleep study, which Mrs. Rodriguez will receive on 1/17 and pursue CPAP vs. NIPPV accordingly     - Dyslipidemia   - Start atorvastatin 20 mg q24h for an estimated 10-yr ASCVD risk of 9%      - Smoking   - At present, in pre-contemplative phase  of smoking cessation and thus declined cessation intervention     Return to clinic in four weeks' time for evaluation of BP and dyspnea symptoms. While it is important to exclude obstructive CAD, she has no clear anginal symptoms. After discussion with patient, we recommend managing her risk factors aggressively as above and arrange for stress test at a later point.       Staffed with Dr. Valadez.    Anselmo Barba  Cardiology Fellow     ATTENDING ATTESTATION:  This patient has been seen and examined by me January 16, 2017 with Dr. Barba. I have reviewed the vitals, laboratory and imaging data relevant to this patient's care. I have edited this note to reflect our joint assessment and plan, and discussed the plan with the patient.    Brigette Valadez MD, MS  Staff Cardiologist  Pager: [phone]    CC  Patient Care Team:  Anjel Busby MD as PCP - General (Family Practice)  Georgina Richardson MD as MD (Pulmonary)  Anjel Busby MD as MD (Family Practice)  Anjel Chris MD as MD (Family Medicine - Sports Medicine)  Tashi Saxena MD as MD (Family Medicine - Sports Medicine)  ANJEL BUSBY

## 2017-01-17 ENCOUNTER — OFFICE VISIT (OUTPATIENT)
Dept: ORTHOPEDICS | Facility: CLINIC | Age: 57
End: 2017-01-17

## 2017-01-17 ENCOUNTER — OFFICE VISIT (OUTPATIENT)
Dept: SLEEP MEDICINE | Facility: CLINIC | Age: 57
End: 2017-01-17
Payer: COMMERCIAL

## 2017-01-17 VITALS
DIASTOLIC BLOOD PRESSURE: 89 MMHG | SYSTOLIC BLOOD PRESSURE: 136 MMHG | OXYGEN SATURATION: 96 % | WEIGHT: 293 LBS | HEART RATE: 94 BPM | HEIGHT: 66 IN | BODY MASS INDEX: 47.09 KG/M2

## 2017-01-17 VITALS
DIASTOLIC BLOOD PRESSURE: 89 MMHG | SYSTOLIC BLOOD PRESSURE: 136 MMHG | BODY MASS INDEX: 47.09 KG/M2 | HEART RATE: 94 BPM | WEIGHT: 293 LBS | OXYGEN SATURATION: 96 % | HEIGHT: 66 IN

## 2017-01-17 DIAGNOSIS — G56.03 BILATERAL CARPAL TUNNEL SYNDROME: Primary | ICD-10-CM

## 2017-01-17 DIAGNOSIS — G47.33 OBSTRUCTIVE SLEEP APNEA: Primary | ICD-10-CM

## 2017-01-17 LAB
CCP AB SER IA-ACNC: 9 U/ML
INTERPRETATION ECG - MUSE: NORMAL

## 2017-01-17 PROCEDURE — 99214 OFFICE O/P EST MOD 30 MIN: CPT | Performed by: PHYSICIAN ASSISTANT

## 2017-01-17 RX ORDER — MELOXICAM 15 MG/1
15 TABLET ORAL DAILY
Qty: 30 TABLET | Refills: 1 | Status: SHIPPED | OUTPATIENT
Start: 2017-01-17 | End: 2017-03-14

## 2017-01-17 NOTE — MR AVS SNAPSHOT
After Visit Summary   1/17/2017    Shiar Rodriguez    MRN: [id number]           Patient Information     Date Of Birth          1960        Visit Information        Provider Department      1/17/2017 9:30 AM Adelaide Livingston PA [facility]        Today's Diagnoses     Obstructive sleep apnea    -  1       Care Instructions      Your BMI is Body mass index is 64.11 kg/(m^2).  Weight management is a personal decision.  If you are interested in exploring weight loss strategies, the following discussion covers the approaches that may be successful. Body mass index (BMI) is one way to tell whether you are at a healthy weight, overweight, or obese. It measures your weight in relation to your height.  A BMI of 18.5 to 24.9 is in the healthy range. A person with a BMI of 25 to 29.9 is considered overweight, and someone with a BMI of 30 or greater is considered obese. More than two-thirds of American adults are considered overweight or obese.  Being overweight or obese increases the risk for further weight gain. Excess weight may lead to heart disease and diabetes.  Creating and following plans for healthy eating and physical activity may help you improve your health.  Weight control is part of healthy lifestyle and includes exercise, emotional health, and healthy eating habits. Careful eating habits lifelong are the mainstay of weight control. Though there are significant health benefits from weight loss, long-term weight loss with diet alone may be very difficult to achieve- studies show long-term success with dietary management in less than 10% of people. Attaining a healthy weight may be especially difficult to achieve in those with severe obesity. In some cases, medications, devices and surgical management might be considered.  What can you do?  If you are overweight or obese and are interested in methods for weight loss, you should discuss this with your provider.     Consider  reducing daily calorie intake by 500 calories.     Keep a food journal.     Avoiding skipping meals, consider cutting portions instead.    Diet combined with exercise helps maintain muscle while optimizing fat loss. Strength training is particularly important for building and maintaining muscle mass. Exercise helps reduce stress, increase energy, and improves fitness. Increasing exercise without diet control, however, may not burn enough calories to loose weight.       Start walking three days a week 10-20 minutes at a time    Work towards walking thirty minutes five days a week     Eventually, increase the speed of your walking for 1-2 minutes at time    In addition, we recommend that you review healthy lifestyles and methods for weight loss available through the National Institutes of Health patient information sites:  http://win.niddk.nih.gov/publications/index.htm    And look into health and wellness programs that may be available through your health insurance provider, employer, local community center, or [employer].    Weight management plan: Patient was referred to their PCP to discuss a diet and exercise plan.          Follow-ups after your visit        Follow-up notes from your care team     Return in about 7 weeks (around 3/7/2017).      Your next 10 appointments already scheduled     Jan 17, 2017  4:00 PM   (Arrive by 3:45 PM)   New Patient Visit with Tashi Saxena MD   [facility] ([facility])    [address] [place] [id number] [phone]            Jan 19, 2017  3:05 PM   (Arrive by 2:50 PM)   Return Visit with Anjel Busby MD   [facility] ([facility])    [address] [place] [id number]   [phone]            Feb 20, 2017 10:00 AM   NM INJECTION with [id number]   [facility], [place], Nuclear Medicine ([facility])    [address] [facility] [address]   [phone]            Feb 20, 2017 10:45 AM   NM SCAN3 with UUNM1   [facility], [place], Nuclear Medicine ([facility])    [address] [facility] [address]   [phone]            Feb 20, 2017 11:15 AM   Ekg Stress Nm Adenosine with UUEKGNMS   [facility] ([facility])    [address] [facility] [id number]               Feb 20, 2017 12:15 PM   NM MPI ADENOSINE with UUNM1   [facility], Nuclear Medicine ([facility])    [address] [facility] [address]   [phone]           For a ONE day exam: Allow 3-4 hours for test. For a TWO day exam: Allow 2 hours PER day for test.  You may need to stop some medicines before the test. Follow your doctor s orders. - If you take a beta blocker: Follow your doctor s specific instructions on taking it prior to and on the day of your exam. - If you take Aggrenox or dipyridamole (Persantine, Permole), stop taking it 48 hours before your test. - If you take Viagra, Cialis or Levitra, stop taking it 48 hours before your test. - If you take theophylline or aminophylline, stop taking it 12 hours before your test.  For patients with diabetes: - If you take insulin, call your diabetes care team. Ask if you should take a 1/2 dose the morning of your test. - If you take diabetes medicine by mouth, don t take it on the morning of your test. Bring it with you to take after the test. (If you have questions, call your diabetes care team.)  Do not take nitrates on the day of your test. Do not wear your Nitro-Patch.  Stop all caffeine 12 hours before the test. This includes coffee, tea, soda pop, chocolate and certain medicines (such as Anacin, Excedrin and NoDoz). Also avoid decaf coffee and tea, as these contain small amounts of caffeine.  No alcohol, smoking or  other tobacco for 12 hours before the test.  Stop eating 3 hours before the test. You may drink water.  Please wear a loose two-piece outfit. If you will have an exercise test, bring rubber-soled walking shoes.  When you arrive, please tell us if you: - Have diabetes - Are breastfeeding - May be pregnant - Have a pacemaker of ICD (implantable defibrillator).  Please call your Imaging Department at your exam site with any questions.            Feb 20, 2017  1:00 PM   (Arrive by 12:45 PM)   Return Visit with Brigette Valadez MD   [facility] ([facility])    [address] [place] [id number] [phone]            Mar 06, 2017  6:30 PM   (Arrive by 6:15 PM)   Return Visit with Georgina Richardson MD   [facility] ([facility])    [address] [place] [id number] [phone]              Who to contact     If you have questions or need follow up information about today's clinic visit or your schedule please contact [facility] directly at [phone].  Normal or non-critical lab and imaging results will be communicated to you by MyChart, letter or phone within 4 business days after the clinic has received the results. If you do not hear from us within 7 days, please contact the clinic through MyChart or phone. If you have a critical or abnormal lab result, we will notify you by phone as soon as possible.  Submit refill requests through [employer] or call your pharmacy and they will forward the refill request to us. Please allow 3 business days for your refill to be completed.          Additional Information About Your Visit        [employer] Information     [employer] gives you secure access to your electronic health record. If you see a primary care provider, you can also send messages to your care team and make appointments. If you have questions, please call your  "primary care clinic.  If you do not have a primary care provider, please call [phone] and they will assist you.        Care EveryWhere ID     This is your Care EveryWhere ID. This could be used by other organizations to access your [facility] medical records  [id number]        Your Vitals Were     Pulse Height BMI (Body Mass Index) Pulse Oximetry          94 1.676 m (5' 6\") 64.11 kg/m2 96%         Blood Pressure from Last 3 Encounters:   01/17/17 146/90   01/16/17 162/93   01/12/17 169/93    Weight from Last 3 Encounters:   01/17/17 180.078 kg (397 lb)   01/16/17 180.396 kg (397 lb 11.2 oz)   01/12/17 180.532 kg (398 lb)              We Performed the Following     Comprehensive DME        Primary Care Provider Office Phone # Fax #    Anjel Busby [phone]       [facility] [address] [place] [id number]        Thank you!     Thank you for choosing [facility]  for your care. Our goal is always to provide you with excellent care. Hearing back from our patients is one way we can continue to improve our services. Please take a few minutes to complete the written survey that you may receive in the mail after your visit with us. Thank you!             Your Updated Medication List - Protect others around you: Learn how to safely use, store and throw away your medicines at www.disposemymeds.org.          This list is accurate as of: 1/17/17 10:07 AM.  Always use your most recent med list.                   Brand Name Dispense Instructions for use    albuterol 108 (90 BASE) MCG/ACT Inhaler    PROAIR HFA/PROVENTIL HFA/VENTOLIN HFA    1 Inhaler    Inhale 2 puffs into the lungs every 6 hours as needed for shortness of breath / dyspnea or wheezing       aspirin 81 MG tablet      Take 81 mg by mouth daily       atorvastatin 20 MG tablet    LIPITOR    90 tablet    Take 1 tablet (20 mg) by mouth daily       baclofen 10 MG tablet    LIORESAL    90 tablet    Take " 1 tablet (10 mg) by mouth 3 times daily       budesonide-formoterol 160-4.5 MCG/ACT Inhaler    SYMBICORT    1 Inhaler    Inhale 2 puffs into the lungs 2 times daily       fluocinonide 0.05 % ointment    LIDEX    60 g    Apply topically daily To hands and feet when dermatitis active       furosemide 20 MG tablet    LASIX    30 tablet    Take 1 tablet (20 mg) by mouth daily       lisinopril 10 MG tablet    PRINIVIL/ZESTRIL    90 tablet    Take 1 tablet (10 mg) by mouth daily       SERTRALINE HCL PO      Take 100 mg by mouth daily

## 2017-01-17 NOTE — MR AVS SNAPSHOT
After Visit Summary   1/17/2017    Shira Rodriguez    MRN: [id number]           Patient Information     Date Of Birth          1960        Visit Information        Provider Department      1/17/2017 4:00 PM Tashi Saxena MD [facility]        Today's Diagnoses     Bilateral carpal tunnel syndrome    -  1        Follow-ups after your visit        Your next 10 appointments already scheduled     Jan 19, 2017  3:05 PM   (Arrive by 2:50 PM)   Return Visit with Anjel Busby MD   [facility] ([facility])    [address]  4th [address]  [place] [address]   [phone]            Jan 23, 2017  1:00 PM   PAP SETUP with CL [place] DME   [facility] ([facility])    [address] [place] [address]   [phone]            Jan 25, 2017 10:40 AM   New Visit with Tashi Saxena MD   [facility] ([facility])    [address] [place] [address]   [phone]            Feb 20, 2017 10:00 AM   NM INJECTION with UUNMINJ1   [facility] ([facility])    [address] [address] [phone]            Feb 20, 2017 10:45 AM   NM SCAN3 with UUNM1   [facility] ([facility])    [address] [id number] [phone]            Feb 20, 2017 11:15 AM   Ekg Stress Nm Adenosine with UUEKGNMS   [facility] ([facility])    [address] [address]               Feb 20, 2017 12:15 PM   NM MPI ADENOSINE with UUNM1   [facility] ([facility])    [address] [address] [phone]           For a ONE day exam: Allow 3-4  hours for test. For a TWO day exam: Allow 2 hours PER day for test.  You may need to stop some medicines before the test. Follow your doctor s orders. - If you take a beta blocker: Follow your doctor s specific instructions on taking it prior to and on the day of your exam. - If you take Aggrenox or dipyridamole (Persantine, Permole), stop taking it 48 hours before your test. - If you take Viagra, Cialis or Levitra, stop taking it 48 hours before your test. - If you take theophylline or aminophylline, stop taking it 12 hours before your test.  For patients with diabetes: - If you take insulin, call your diabetes care team. Ask if you should take a 1/2 dose the morning of your test. - If you take diabetes medicine by mouth, don t take it on the morning of your test. Bring it with you to take after the test. (If you have questions, call your diabetes care team.)  Do not take nitrates on the day of your test. Do not wear your Nitro-Patch.  Stop all caffeine 12 hours before the test. This includes coffee, tea, soda pop, chocolate and certain medicines (such as Anacin, Excedrin and NoDoz). Also avoid decaf coffee and tea, as these contain small amounts of caffeine.  No alcohol, smoking or other tobacco for 12 hours before the test.  Stop eating 3 hours before the test. You may drink water.  Please wear a loose two-piece outfit. If you will have an exercise test, bring rubber-soled walking shoes.  When you arrive, please tell us if you: - Have diabetes - Are breastfeeding - May be pregnant - Have a pacemaker of ICD (implantable defibrillator).  Please call your Imaging Department at your exam site with any questions.            Feb 20, 2017  1:00 PM   (Arrive by 12:45 PM)   Return Visit with Brigette Valadez MD   [facility] ([facility])    [address] [place] [id number] [phone]            Mar 06, 2017  6:30 PM   (Arrive by 6:15 PM)   Return Visit with  "Georgina Richardson MD   [facility] ([facility])    [address]  [address] [facility] [address] [phone]              Who to contact     Please call your clinic at [phone] to:    Ask questions about your health    Make or cancel appointments    Discuss your medicines    Learn about your test results    Speak to your doctor   If you have compliments or concerns about an experience at your clinic, or if you wish to file a complaint, please contact [facility] Patient Relations at [phone] or email us at [email or web address]         Additional Information About Your Visit        Gate 53|[employer] Information     Last Sizet gives you secure access to your electronic health record. If you see a primary care provider, you can also send messages to your care team and make appointments. If you have questions, please call your primary care clinic.  If you do not have a primary care provider, please call [phone] and they will assist you.      [employer] is an electronic gateway that provides easy, online access to your medical records. With [employer], you can request a clinic appointment, read your test results, renew a prescription or communicate with your care team.     To access your existing account, please contact your [facility] or call [phone] for assistance.        Care EveryWhere ID     This is your Care EveryWhere ID. This could be used by other organizations to access your [place] medical records  [id number]        Your Vitals Were     Pulse Height BMI (Body Mass Index) Pulse Oximetry          94 1.676 m (5' 6\") 64.11 kg/m2 96%         Blood Pressure from Last 3 Encounters:   01/17/17 136/89   01/17/17 136/89   01/16/17 162/93    Weight from Last 3 Encounters:   01/17/17 180.078 kg (397 lb)   01/17/17 180.078 kg (397 lb)   01/16/17 180.396 kg (397 lb 11.2 oz)         "      Today, you had the following     No orders found for display         Today's Medication Changes          These changes are accurate as of: 1/17/17  4:51 PM.  If you have any questions, ask your nurse or doctor.               Start taking these medicines.        Dose/Directions    meloxicam 15 MG tablet   Commonly known as:  MOBIC   Used for:  Bilateral carpal tunnel syndrome   Started by:  Tashi Saxena MD        Dose:  15 mg   Take 1 tablet (15 mg) by mouth daily   Quantity:  30 tablet   Refills:  1            Where to get your medicines      These medications were sent to [facility] [id number] - [place], [place] - [address]  [address], [place] [address]    Hours:  24-hours Phone:  [phone]    - meloxicam 15 MG tablet             Primary Care Provider Office Phone # Fax #    Anjel Busby [phone]       PRIMARY [facility] [address] [place] [address]        Thank you!     Thank you for choosing [facility]  for your care. Our goal is always to provide you with excellent care. Hearing back from our patients is one way we can continue to improve our services. Please take a few minutes to complete the written survey that you may receive in the mail after your visit with us. Thank you!             Your Updated Medication List - Protect others around you: Learn how to safely use, store and throw away your medicines at www.disposemymeds.org.          This list is accurate as of: 1/17/17  4:51 PM.  Always use your most recent med list.                   Brand Name Dispense Instructions for use    albuterol 108 (90 BASE) MCG/ACT Inhaler    PROAIR HFA/PROVENTIL HFA/VENTOLIN HFA    1 Inhaler    Inhale 2 puffs into the lungs every 6 hours as needed for shortness of breath / dyspnea or wheezing       aspirin 81 MG tablet      Take 81 mg by mouth daily       atorvastatin 20 MG tablet    LIPITOR     90 tablet    Take 1 tablet (20 mg) by mouth daily       baclofen 10 MG tablet    LIORESAL    90 tablet    Take 1 tablet (10 mg) by mouth 3 times daily       budesonide-formoterol 160-4.5 MCG/ACT Inhaler    SYMBICORT    1 Inhaler    Inhale 2 puffs into the lungs 2 times daily       fluocinonide 0.05 % ointment    LIDEX    60 g    Apply topically daily To hands and feet when dermatitis active       furosemide 20 MG tablet    LASIX    30 tablet    Take 1 tablet (20 mg) by mouth daily       lisinopril 10 MG tablet    PRINIVIL/ZESTRIL    90 tablet    Take 1 tablet (10 mg) by mouth daily       meloxicam 15 MG tablet    MOBIC    30 tablet    Take 1 tablet (15 mg) by mouth daily       SERTRALINE HCL PO      Take 100 mg by mouth daily

## 2017-01-17 NOTE — PROGRESS NOTES
Sleep Study Follow-Up Visit:    Date on this visit: 1/17/2017    Shira Rodriguez comes in today for follow-up of her sleep study done on 1/11/2017 at the [facility] [place] to evaluate for probable worsening of CYRUS with interim ~100 lb weight gain and potential for obesity hypoventilation.    Due to presence of respiratory events, this study was done in two parts (baseline followed by CPAP titration). Sleep study interpreted by Dr. Zuluaga.   Diagnostic PSG: Sleep latency 24.4 minutes with Ambien.  REM not achieved.   REM latency - minutes.  Sleep efficiency 77.6%. Total sleep time 126.0 minutes.    Sleep architecture:  Stage 1, 7.5% (5%), stage 2, 69.0% (45-55%), stage 3, 23.4% (15-20%), stage REM, 0.0% (20-25%).  AHI was 80.5, with desaturations down to 82.9%. RDI 90.5. Periodic Limb Movement Index -/hour.       Sustained Sleep Associated Hypoventilation. TCM monitoring was used however significant hypoventilation was not present with a maximum change of ~3 mmHg.     CPAP titration:  Sleep latency 15.0 minutes.  REM latency 117.5 minutes.  Sleep efficiency 73.7%. Total sleep time 230.0 minutes. Sleep architecture:  Stage 1, 14.3% (5%), stage 2, 43.5% (45-55%), stage 3, 13.7% (15-20%), stage REM, 28.5% (20-25%).  CPAP was titrated to 14 cm/H2O and was effective in supine NREM, but seen to have residual obstructive events and sustained hypoxemia in the mid to caryn 80's in supine REM. Transitioned to bilevel PAP and at end of study bilevel 19/13 cm/H20 spontaneous appeared effective in brief period of supine REM. TCM remained stable.   Periodic Limb Movement Index 2.3/hour.       Past medical/surgical history, family history, social history, medications and allergies were reviewed.      Problem List:  Patient Active Problem List    Diagnosis Date Noted     CYRUS (obstructive sleep apnea) 01/04/2017     Priority: Medium     1/30/2016-AHI 16.6, lowest oxygen saturation was 89%       Elevated blood  "pressure (not hypertension) 01/04/2017     Priority: Medium     Degeneration of cervical intervertebral disc 12/26/2016     Priority: Medium     Arthritis of knee, degenerative 06/14/2012     Priority: Medium     Morbid obesity (H) 01/11/2012     Priority: Medium     Adjustment disorder with depressed mood 10/27/2011     Priority: Medium     Tobacco use disorder 10/27/2011     Priority: Medium     Primary localized osteoarthrosis, pelvic region and thigh 06/02/2004     Priority: Medium      /89 mmHg  Pulse 94  Ht 1.676 m (5' 6\")  Wt 180.078 kg (397 lb)  BMI 64.11 kg/m2  SpO2 96%  Impression/Plan:    1. Severe CYRUS with sleep-associated hypoxemia and probable REM hypoventilation. Bilevel PAP at 19/13 cm H2O appeared effective.   - Overnight polysomnogram was reviewed in detail today and a copy given to her for her records. Treatment options for severe CYRUS reviewed.   - Discussed this level of CYRUS is associated with increase in long-term cardiovascular risk factors  - Will arrange treatment with bilevel PAP 19/13 m/H20.    She will follow up with me in about 7 weeks, sooner if questions/concerns.     Twenty-five minutes spent with patient, all of which were spent face-to-face counseling, consulting, coordinating plan of care.      Adelaide Livingston PA-C      CC: Anjel Busby       "

## 2017-01-17 NOTE — NURSING NOTE
"Reason For Visit:   Chief Complaint   Patient presents with     Musculoskeletal Problem     BL hand numbness, Dr Chauhan referral, EM/10/17 abnormal study, BL carpal tunnel MRI: 16, Cervical degenerative disc disease     Occupation: Starting job on [employer]   Currently working? No.  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

## 2017-01-17 NOTE — Clinical Note
1/17/2017       RE: Shira Rodriguez  [address] [facility] [address]     Dear Colleague,    Thank you for referring your patient, Shira Rodriguez, to the [facility] at [facility]. Please see a copy of my visit note below.    HISTORY OF PRESENT ILLNESS  Ms. Rodriguez is a pleasant 56 year old year old female who presents to clinic today with bilateral hand numbness. Had previous positive study for CT syndrome bilaterally.   She has intermittent daily median nerve numbness.  Shira explains that she has taken a medrol pack on two occasions which help her wrists a little.   Location: bilateral wrists/hands  Quality:  achy pain    Severity: 6/10 at worst    Duration: 6+ months  Timing: occurs intermittently/daily  Context: occurs while sitting at desk and using hands/wrists  Modifying factors:  resting and non-use makes it better, movement and use makes it worse  Associated signs & symptoms: numbness in hands  Previous similar pain: yes- had EMG showing CT    Additional history: as documented    MEDICAL HISTORY  Patient Active Problem List   Diagnosis     Primary localized osteoarthrosis, pelvic region and thigh     Adjustment disorder with depressed mood     Tobacco use disorder     Morbid obesity (H)     Arthritis of knee, degenerative     Degeneration of cervical intervertebral disc     CYRUS (obstructive sleep apnea)     Elevated blood pressure (not hypertension)       Current Outpatient Prescriptions   Medication Sig Dispense Refill     aspirin 81 MG tablet Take 81 mg by mouth daily       lisinopril (PRINIVIL/ZESTRIL) 10 MG tablet Take 1 tablet (10 mg) by mouth daily 90 tablet 3     atorvastatin (LIPITOR) 20 MG tablet Take 1 tablet (20 mg) by mouth daily 90 tablet 3     baclofen (LIORESAL) 10 MG tablet Take 1 tablet (10 mg) by mouth 3 times daily 90 tablet 1     SERTRALINE HCL PO Take 100 mg by mouth daily        furosemide (LASIX) 20 MG tablet Take 1 tablet  "(20 mg) by mouth daily 30 tablet 0     albuterol (PROAIR HFA/PROVENTIL HFA/VENTOLIN HFA) 108 (90 BASE) MCG/ACT Inhaler Inhale 2 puffs into the lungs every 6 hours as needed for shortness of breath / dyspnea or wheezing 1 Inhaler 0     fluocinonide (LIDEX) 0.05 % ointment Apply topically daily To hands and feet when dermatitis active 60 g 2     budesonide-formoterol (SYMBICORT) 160-4.5 MCG/ACT inhaler Inhale 2 puffs into the lungs 2 times daily 1 Inhaler 1       Allergies   Allergen Reactions     Adhesive Tape Blisters     Erythromycin Rash       Family History   Problem Relation Age of Onset     Thyroid Disease Mother      sister     Circulatory Mother      CVA p endarderectomy     Hypertension Mother      Breast Cancer Paternal Grandmother      CANCER Paternal Grandmother      pancreatic     Cardiovascular Paternal Aunt      Cardiovascular Paternal Uncle      Depression Other      Skin Cancer Mother      MMohs surgery with skin graft       Additional medical/Social/Surgical histories reviewed in [facility] and updated as appropriate.     REVIEW OF SYSTEMS (1/17/2017)  10 point ROS of systems including Constitutional, Eyes, Respiratory, Cardiovascular, Gastroenterology, Genitourinary, Integumentary, Musculoskeletal, Psychiatric were all negative except for pertinent positives noted in my HPI.     PHYSICAL EXAM  Filed Vitals:    01/17/17 1611   Height: 1.676 m (5' 6\")   Weight: 180.078 kg (397 lb)     Vital Signs: /89 mmHg  Pulse 94  Ht 1.676 m (5' 6\")  Wt 180.078 kg (397 lb)  BMI 64.11 kg/m2  SpO2 96% Patient declined being weighed. Body mass index is 64.11 kg/(m^2).    General  - normal appearance, in no obvious distress, overweight  CV  - normal radial pulse  Pulm  - normal respiratory pattern, non-labored  Musculoskeletal - bilateral wrist  - inspection: mild thenar atrophy, normal joint alignment, no swelling  - palpation: no bony or soft tissue tenderness, no tenderness at the anatomical snuffbox  - ROM:  " 90 deg flexion   70 deg extension   25 deg abduction   65 deg adduction  - strength: 4/5  strength, 4/5 wrist abduction, 5/5 flexion, extension, pronation, supination, adduction  - special tests:  (+) Tinel's  (-) Finkelstein  (+) Phalen  (-) Escobedo click test  (-) ulnar impaction  Neuro  - some thenar numbness, no motor deficit, grossly normal coordination, normal muscle tone  Skin  - no ecchymosis, erythema, warmth, or induration, no obvious rash  Psych  - interactive, appropriate, normal mood and affect  Right worse    ASSESSMENT & PLAN  55 yo female with bilateral carpal tunnel syndrome, right worse  -start mobic daily x 7 days, then PRN  -start using comfortable wrist braces nightly and daily with use  -rtc in 2 weeks for injections PRN    Tashi Saxena MD, CAM

## 2017-01-17 NOTE — PATIENT INSTRUCTIONS

## 2017-01-17 NOTE — NURSING NOTE
"Chief Complaint   Patient presents with     Sleep Problem     sleep study follow up       Initial /90 mmHg  Pulse 94  Ht 1.676 m (5' 6\")  Wt 180.078 kg (397 lb)  BMI 64.11 kg/m2  SpO2 96% Estimated body mass index is 64.11 kg/(m^2) as calculated from the following:    Height as of this encounter: 1.676 m (5' 6\").    Weight as of this encounter: 180.078 kg (397 lb).  BP completed using cuff size: large    "

## 2017-01-17 NOTE — PROGRESS NOTES
HISTORY OF PRESENT ILLNESS  Ms. Rodriguez is a pleasant 56 year old year old female who presents to clinic today with bilateral hand numbness. Had previous positive study for CT syndrome bilaterally.   She has intermittent daily median nerve numbness.  Shira explains that she has taken a medrol pack on two occasions which help her wrists a little.   Location: bilateral wrists/hands  Quality:  achy pain    Severity: 6/10 at worst    Duration: 6+ months  Timing: occurs intermittently/daily  Context: occurs while sitting at desk and using hands/wrists  Modifying factors:  resting and non-use makes it better, movement and use makes it worse  Associated signs & symptoms: numbness in hands  Previous similar pain: yes- had EMG showing CT    Additional history: as documented    MEDICAL HISTORY  Patient Active Problem List   Diagnosis     Primary localized osteoarthrosis, pelvic region and thigh     Adjustment disorder with depressed mood     Tobacco use disorder     Morbid obesity (H)     Arthritis of knee, degenerative     Degeneration of cervical intervertebral disc     CYRUS (obstructive sleep apnea)     Elevated blood pressure (not hypertension)       Current Outpatient Prescriptions   Medication Sig Dispense Refill     aspirin 81 MG tablet Take 81 mg by mouth daily       lisinopril (PRINIVIL/ZESTRIL) 10 MG tablet Take 1 tablet (10 mg) by mouth daily 90 tablet 3     atorvastatin (LIPITOR) 20 MG tablet Take 1 tablet (20 mg) by mouth daily 90 tablet 3     baclofen (LIORESAL) 10 MG tablet Take 1 tablet (10 mg) by mouth 3 times daily 90 tablet 1     SERTRALINE HCL PO Take 100 mg by mouth daily        furosemide (LASIX) 20 MG tablet Take 1 tablet (20 mg) by mouth daily 30 tablet 0     albuterol (PROAIR HFA/PROVENTIL HFA/VENTOLIN HFA) 108 (90 BASE) MCG/ACT Inhaler Inhale 2 puffs into the lungs every 6 hours as needed for shortness of breath / dyspnea or wheezing 1 Inhaler 0     fluocinonide (LIDEX) 0.05 % ointment Apply  "topically daily To hands and feet when dermatitis active 60 g 2     budesonide-formoterol (SYMBICORT) 160-4.5 MCG/ACT inhaler Inhale 2 puffs into the lungs 2 times daily 1 Inhaler 1       Allergies   Allergen Reactions     Adhesive Tape Blisters     Erythromycin Rash       Family History   Problem Relation Age of Onset     Thyroid Disease Mother      sister     Circulatory Mother      CVA p endarderectomy     Hypertension Mother      Breast Cancer Paternal Grandmother      CANCER Paternal Grandmother      pancreatic     Cardiovascular Paternal Aunt      Cardiovascular Paternal Uncle      Depression Other      Skin Cancer Mother      MMohs surgery with skin graft       Additional medical/Social/Surgical histories reviewed in [facility] and updated as appropriate.     REVIEW OF SYSTEMS (1/17/2017)  10 point ROS of systems including Constitutional, Eyes, Respiratory, Cardiovascular, Gastroenterology, Genitourinary, Integumentary, Musculoskeletal, Psychiatric were all negative except for pertinent positives noted in my HPI.     PHYSICAL EXAM  Filed Vitals:    01/17/17 1611   Height: 1.676 m (5' 6\")   Weight: 180.078 kg (397 lb)     Vital Signs: /89 mmHg  Pulse 94  Ht 1.676 m (5' 6\")  Wt 180.078 kg (397 lb)  BMI 64.11 kg/m2  SpO2 96% Patient declined being weighed. Body mass index is 64.11 kg/(m^2).    General  - normal appearance, in no obvious distress, overweight  CV  - normal radial pulse  Pulm  - normal respiratory pattern, non-labored  Musculoskeletal - bilateral wrist  - inspection: mild thenar atrophy, normal joint alignment, no swelling  - palpation: no bony or soft tissue tenderness, no tenderness at the anatomical snuffbox  - ROM:  90 deg flexion   70 deg extension   25 deg abduction   65 deg adduction  - strength: 4/5  strength, 4/5 wrist abduction, 5/5 flexion, extension, pronation, supination, adduction  - special tests:  (+) Tinel's  (-) Finkelstein  (+) Phalen  (-) Escobedo click test  (-) ulnar " impaction  Neuro  - some thenar numbness, no motor deficit, grossly normal coordination, normal muscle tone  Skin  - no ecchymosis, erythema, warmth, or induration, no obvious rash  Psych  - interactive, appropriate, normal mood and affect  Right worse    ASSESSMENT & PLAN  57 yo female with bilateral carpal tunnel syndrome, right worse  -start mobic daily x 7 days, then PRN  -start using comfortable wrist braces nightly and daily with use  -rtc in 2 weeks for injections PRN    Tashi Saxena MD, CAQSM      Answers for HPI/ROS submitted by the patient on 1/12/2017   General Symptoms: Yes  Skin Symptoms: No  HENT Symptoms: No  EYE SYMPTOMS: No  HEART SYMPTOMS: Yes  LUNG SYMPTOMS: Yes  INTESTINAL SYMPTOMS: Yes  URINARY SYMPTOMS: No  GYNECOLOGIC SYMPTOMS: No  BREAST SYMPTOMS: No  SKELETAL SYMPTOMS: Yes  BLOOD SYMPTOMS: No  NERVOUS SYSTEM SYMPTOMS: Yes  MENTAL HEALTH SYMPTOMS: Yes  Fever: No  Loss of appetite: No  Weight loss: No  Weight gain: No  Fatigue: Yes  Night sweats: No  Chills: No  Increased stress: Yes  Excessive hunger: No  Excessive thirst: No  Feeling hot or cold when others believe the temperature is normal: No  Loss of height: No  Post-operative complications: No  Surgical site pain: No  Hallucinations: No  Change in or Loss of Energy: No  Hyperactivity: No  Confusion: No  Cough: Yes  Sputum or phlegm: Yes  Coughing up blood: No  Difficulty breating or shortness of breath: Yes  Snoring: Yes  Wheezing: Yes  Difficulty breathing on exertion: Yes  Respiratory pain: No  Nighttime Cough: Yes  Difficulty breathing when lying flat: No  Chest pain or pressure: No  Fast or irregular heartbeat: No  Pain in legs with walking: No  Swelling in feet or ankles: Yes  Trouble breathing while lying down: No  Fingers or Toes appear blue: No  High blood pressure: Yes  Low blood pressure: No  Fainting: No  Murmurs: No  Chest pain on exertion: No  Chest pain at rest: No  Cramping pain in leg during exercise: No  Pacemaker:  No  Varicose veins: No  Edema or swelling: Yes  Fast heart beat: No  Wake up at night with shortness of breath: No  Heart flutters: No  Light-headedness: Yes  Exercise intolerance: No  Heart burn or indigestion: No  Nausea: No  Vomiting: No  Abdominal pain: No  Bloating: No  Constipation: No  Diarrhea: No  Blood in stool: No  Black stools: No  Rectal or Anal pain: No  Fecal incontinence: No  Rectal bleeding: Yes  Yellowing of skin or eyes: No  Vomit with blood: No  Change in stools: No  Hemorrhoids: No  Back pain: Yes  Muscle aches: Yes  Neck pain: No  Swollen joints: No  Joint pain: Yes  Bone pain: No  Muscle cramps: No  Muscle weakness: No  Joint stiffness: Yes  Bone fracture: No  Trouble with coordination: No  Dizziness or trouble with balance: Yes  Fainting or black-out spells: No  Memory loss: No  Headache: No  Seizures: No  Speech problems: No  Tingling: Yes  Tremor: No  Weakness: No  Difficulty walking: No  Paralysis: No  Numbness: Yes  Nervous or Anxious: Yes  Depression: Yes  Trouble sleeping: Yes  Trouble thinking or concentrating: Yes  Mood changes: No  Panic attacks: No

## 2017-01-18 ENCOUNTER — TELEPHONE (OUTPATIENT)
Dept: RHEUMATOLOGY | Facility: CLINIC | Age: 57
End: 2017-01-18

## 2017-01-18 NOTE — TELEPHONE ENCOUNTER
----- Message from Torsten Rand MD sent at 1/17/2017  3:39 PM CST -----  I will forward this to my nursing staff to see our availability for a new patient in the next month or so. Otherwise, outside referral may be needed.    Felipe, does anyone have a new patient opening in the next month? Brea?    Agustin    ----- Message -----     From: Anjel Busby MD     Sent: 1/17/2017   3:28 PM       To: Torsten Rand MD, Tashi Saxena MD    Thanks! Can we refer to rheumatology here at this point, or elsewhere still?  Oliver see below, you see her later today  Stoney  ----- Message -----     From: Torsten Rand MD     Sent: 1/17/2017   3:08 PM       To: Anjel Busby MD    The inflammatory markers are not very impressive, and don't suggest systemic rheumatic disease. I note the very weak CCP, RF, and lyme serologies. Again, low titers and broad distribution could indicate that these are false positives.    I note that she doesn't have synovitis by exam, so RA presenting with carpal tunnel disease but no other synovitis seems unusual.     Nevertheless, the weak serologies, documented entrapment syndrome, and modest response to corticosteroid could indicate some local inflammation in the hand/wrist.    This could be a case where a soft tissue MRI of the worst wrist/hand could be revealing and determine if there is a tenosynovitis with edema.    Rheumatology consultation of course could help with this.    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 Obese, so at first I thought arm problem pinched nerve in neck, it did not help her arm pain but (I thought oddly) her leg swelling went away on the burst of prednisone. Then it came  back--I retried it as Lasix not helping, and went away again. Ran inflammatory labs, mildly high RF, did CCP, up a bit too. Did CRP/ESR normal/barley up but I ran AFTER first burst pred unfortunately.    Also some asthma type symptoms at first presentation.    Is quite obese, but cardiac w/u (not all done yet) neg so far and cardiac edema shouldn't get better w/ prednisone.    Anyway, perhaps she could have new inflammatory disorder? Neg Lyme western blot (initial screen abnl).    Puzzling    Thanks  Stoney

## 2017-01-19 ENCOUNTER — OFFICE VISIT (OUTPATIENT)
Dept: FAMILY MEDICINE | Facility: CLINIC | Age: 57
End: 2017-01-19

## 2017-01-19 VITALS
OXYGEN SATURATION: 94 % | SYSTOLIC BLOOD PRESSURE: 162 MMHG | DIASTOLIC BLOOD PRESSURE: 90 MMHG | HEART RATE: 81 BPM | BODY MASS INDEX: 64.53 KG/M2 | WEIGHT: 293 LBS | RESPIRATION RATE: 18 BRPM

## 2017-01-19 DIAGNOSIS — R42 VERTIGO: Primary | ICD-10-CM

## 2017-01-19 ASSESSMENT — PAIN SCALES - GENERAL: PAINLEVEL: NO PAIN (0)

## 2017-01-19 NOTE — NURSING NOTE
Chief Complaint   Patient presents with     Hypertension     Patient is here to follow up on high blood pressure.      Monae Ruiz LPN at 3:10 PM on 1/19/2017.

## 2017-01-19 NOTE — MR AVS SNAPSHOT
After Visit Summary   1/19/2017    Shira Rodriguez    MRN: [id number]           Patient Information     Date Of Birth          1960        Visit Information        Provider Department      1/19/2017 3:05 PM Anjel Busby MD [facility]        Today's Diagnoses     Vertigo    -  1       Care Instructions    Primary Care Center Medication Refill Request Information:  * Please contact your pharmacy regarding ANY request for medication refills.  ** [facility] Prescription Fax = [phone]  * Please allow 3 business days for routine medication refills.  * Please allow 5 business days for controlled substance medication refills.     Primary Care Center Test Result notification information:  *You will be notified with in 7-10 days of your appointment day regarding the results of your test.  If you are on MyChart you will be notified as soon as the provider has reviewed the results and signed off on them.    Primary Care Center Phone Number [phone]    [phone] (4th Floor [facility])             Follow-ups after your visit        Additional Services     OTOLARYNGOLOGY REFERRAL       Your provider has referred you to: [facility]: Adult Ear, Nose and Throat Clinic (Otolaryngology) [facility] [phone]  [email or web address]    Please be aware that coverage of these services is subject to the terms and limitations of your health insurance plan.  Call member services at your health plan with any benefit or coverage questions.      Please bring the following with you to your appointment:    (1) Any X-Rays, CTs or MRIs which have been performed.  Contact the facility where they were done to arrange for  prior to your scheduled appointment.   (2) List of current medications  (3) This referral request   (4) Any documents/labs given to you for this referral                  Your next 10 appointments already scheduled     Jan 23, 2017   1:00 PM   PAP SETUP with CL SC DME   [facility] ([facility])    [address]  [facility] [address]   [phone]            Jan 25, 2017 10:40 AM   New Visit with Tashi Saxena MD   [facility] ([facility])    [address] [place] [address]   [phone]            Jan 27, 2017 10:05 AM   (Arrive by 9:50 AM)   Return Visit with Anjel Busby MD   [facility] ([facility])    [address]  4th Floor  [facility] [address] [phone]            Feb 20, 2017 10:00 AM   NM INJECTION with UUNMINJ1   [facility] Nuclear Medicine ([facility])    [address] [address] [phone]            Feb 20, 2017 10:45 AM   NM SCAN3 with UUNM1   [facility], Nuclear Medicine ([facility])    [address] [address] [phone]            Feb 20, 2017 11:15 AM   Ekg Stress Nm Adenosine with UUEKGNMS   [facility] ([facility])    [address] [place] [phone]               Feb 20, 2017 12:15 PM   NM MPI ADENOSINE with UUNM1   [facility], Nuclear Medicine ([facility])    [address] [address] [phone]           For a ONE day exam: Allow 3-4 hours for test. For a TWO day exam: Allow 2 hours PER day for test.  You may need to stop some medicines before the test. Follow your doctor s orders. - If you take a beta blocker: Follow your doctor s specific instructions on taking it prior to and on the day of your exam. - If you take Aggrenox or dipyridamole (Persantine, Permole), stop taking it 48 hours before your test. - If you take Viagra, Cialis or Levitra, stop taking it 48 hours before  your test. - If you take theophylline or aminophylline, stop taking it 12 hours before your test.  For patients with diabetes: - If you take insulin, call your diabetes care team. Ask if you should take a 1/2 dose the morning of your test. - If you take diabetes medicine by mouth, don t take it on the morning of your test. Bring it with you to take after the test. (If you have questions, call your diabetes care team.)  Do not take nitrates on the day of your test. Do not wear your Nitro-Patch.  Stop all caffeine 12 hours before the test. This includes coffee, tea, soda pop, chocolate and certain medicines (such as Anacin, Excedrin and NoDoz). Also avoid decaf coffee and tea, as these contain small amounts of caffeine.  No alcohol, smoking or other tobacco for 12 hours before the test.  Stop eating 3 hours before the test. You may drink water.  Please wear a loose two-piece outfit. If you will have an exercise test, bring rubber-soled walking shoes.  When you arrive, please tell us if you: - Have diabetes - Are breastfeeding - May be pregnant - Have a pacemaker of ICD (implantable defibrillator).  Please call your Imaging Department at your exam site with any questions.            Feb 20, 2017  1:00 PM   (Arrive by 12:45 PM)   Return Visit with Brigette Valadez MD   [facility] ([facility])    [address] [place] [address] [phone]            Mar 06, 2017  6:30 PM   (Arrive by 6:15 PM)   Return Visit with Georgina Richardson MD   [facility] ([facility])    [address] [place] [address] [phone]              Who to contact     Please call your clinic at [phone] to:    Ask questions about your health    Make or cancel appointments    Discuss your medicines    Learn about your test results    Speak to your doctor   If you have compliments or concerns about an  experience at your clinic, or if you wish to file a complaint, please contact [facility] Patient Relations at [phone] or email us at [email or web address]         Additional Information About Your Visit        [employer] Information     [employer] gives you secure access to your electronic health record. If you see a primary care provider, you can also send messages to your care team and make appointments. If you have questions, please call your primary care clinic.  If you do not have a primary care provider, please call [phone] and they will assist you.      [employer] is an electronic gateway that provides easy, online access to your medical records. With [employer], you can request a clinic appointment, read your test results, renew a prescription or communicate with your care team.     To access your existing account, please contact your [facility] or call [phone] for assistance.        Care EveryWhere ID     This is your Care EveryWhere ID. This could be used by other organizations to access your [facility] medical records  [id number]        Your Vitals Were     Pulse Respirations Pulse Oximetry Breastfeeding?          81 18 94% No         Blood Pressure from Last 3 Encounters:   01/19/17 162/90   01/17/17 136/89   01/17/17 136/89    Weight from Last 3 Encounters:   01/19/17 181.257 kg (399 lb 9.6 oz)   01/17/17 180.078 kg (397 lb)   01/17/17 180.078 kg (397 lb)              We Performed the Following     OTOLARYNGOLOGY REFERRAL          Today's Medication Changes          These changes are accurate as of: 1/19/17  4:10 PM.  If you have any questions, ask your nurse or doctor.               Stop taking these medicines if you haven't already. Please contact your care team if you have questions.     baclofen 10 MG tablet   Commonly known as:  LIORESAL   Stopped by:  Anjel Busby MD                    Primary Care Provider Office Phone  # Fax #    Anjel Busby [phone]       PRIMARY CARE CENTER [address] [place] [address]        Thank you!     Thank you for choosing [facility]  for your care. Our goal is always to provide you with excellent care. Hearing back from our patients is one way we can continue to improve our services. Please take a few minutes to complete the written survey that you may receive in the mail after your visit with us. Thank you!             Your Updated Medication List - Protect others around you: Learn how to safely use, store and throw away your medicines at www.disposemymeds.org.          This list is accurate as of: 1/19/17  4:10 PM.  Always use your most recent med list.                   Brand Name Dispense Instructions for use    albuterol 108 (90 BASE) MCG/ACT Inhaler    PROAIR HFA/PROVENTIL HFA/VENTOLIN HFA    1 Inhaler    Inhale 2 puffs into the lungs every 6 hours as needed for shortness of breath / dyspnea or wheezing       aspirin 81 MG tablet      Take 81 mg by mouth daily       atorvastatin 20 MG tablet    LIPITOR    90 tablet    Take 1 tablet (20 mg) by mouth daily       budesonide-formoterol 160-4.5 MCG/ACT Inhaler    SYMBICORT    1 Inhaler    Inhale 2 puffs into the lungs 2 times daily       fluocinonide 0.05 % ointment    LIDEX    60 g    Apply topically daily To hands and feet when dermatitis active       furosemide 20 MG tablet    LASIX    30 tablet    Take 1 tablet (20 mg) by mouth daily       lisinopril 10 MG tablet    PRINIVIL/ZESTRIL    90 tablet    Take 1 tablet (10 mg) by mouth daily       meloxicam 15 MG tablet    MOBIC    30 tablet    Take 1 tablet (15 mg) by mouth daily       SERTRALINE HCL PO      Take 100 mg by mouth daily

## 2017-01-20 NOTE — PROGRESS NOTES
SUBJECTIVE:    Pt is a 56 year old female with pmh of     Patient Active Problem List   Diagnosis     Primary localized osteoarthrosis, pelvic region and thigh     Adjustment disorder with depressed mood     Tobacco use disorder     Morbid obesity (H)     Arthritis of knee, degenerative     Degeneration of cervical intervertebral disc     CYRUS (obstructive sleep apnea)     Elevated blood pressure (not hypertension)       who is here for evaluation of had concerns including Hypertension.    Here for a f/u  Heart clinic note rvwd--pt is having troube walking far due to SOB will do handicap form (given to pt) for diastolic heart failure dx    Vertigo: with position change, head motion, less frequent severe but there still    Arm pain better somewhat, in splints on Mobic (knows could raise BP, and also could retain fluid from)    Working on rheum appt, see last note, mild elevated RA labs    No new c/o    Has new dx, CYRUS, awaiting rx for    Allergies   Allergen Reactions     Adhesive Tape Blisters     Erythromycin Rash           Current Outpatient Prescriptions   Medication Sig Dispense Refill     aspirin 81 MG tablet Take 81 mg by mouth daily       meloxicam (MOBIC) 15 MG tablet Take 1 tablet (15 mg) by mouth daily 30 tablet 1     lisinopril (PRINIVIL/ZESTRIL) 10 MG tablet Take 1 tablet (10 mg) by mouth daily 90 tablet 3     atorvastatin (LIPITOR) 20 MG tablet Take 1 tablet (20 mg) by mouth daily 90 tablet 3     SERTRALINE HCL PO Take 100 mg by mouth daily        furosemide (LASIX) 20 MG tablet Take 1 tablet (20 mg) by mouth daily 30 tablet 0     albuterol (PROAIR HFA/PROVENTIL HFA/VENTOLIN HFA) 108 (90 BASE) MCG/ACT Inhaler Inhale 2 puffs into the lungs every 6 hours as needed for shortness of breath / dyspnea or wheezing 1 Inhaler 0     fluocinonide (LIDEX) 0.05 % ointment Apply topically daily To hands and feet when dermatitis active 60 g 2     budesonide-formoterol (SYMBICORT) 160-4.5 MCG/ACT inhaler Inhale 2 puffs  into the lungs 2 times daily 1 Inhaler 1       Social History   Substance Use Topics     Smoking status: Current Every Day Smoker -- 0.75 packs/day for 33 years     Types: Cigarettes     Start date: 01/01/1982     Smokeless tobacco: Never Used     Alcohol Use: 0.0 oz/week     0 Standard drinks or equivalent per week      Comment: OCCASSIONALLY 2 DRINKS PER MONTH             OBJECTIVE:  /90 mmHg  Pulse 81  Resp 18  Wt 181.257 kg (399 lb 9.6 oz)  SpO2 94%  Breastfeeding? No  GENERAL APPEARANCE: Alert, no acute distress  NEURO: Alert, oriented, speech and mentation normal  PSYCHE: mentation appears normal, affect and mood normal    ASSESSMENT/PLAN:    CTS: cont splints, mobic, close f/u Sp Med  Diastolic heart falure: per cardio  Possible autoimmune d/o; working with rheum to see if can get in  Vertigo: improved but if worse will see ENT  Handicap form given  CYRUS; per sleep clinic  1/2 hr visit over half couns/coord care    AMANDA JUILEN MD

## 2017-01-23 ENCOUNTER — TELEPHONE (OUTPATIENT)
Dept: SLEEP MEDICINE | Facility: CLINIC | Age: 57
End: 2017-01-23

## 2017-01-23 ENCOUNTER — DOCUMENTATION ONLY (OUTPATIENT)
Dept: SLEEP MEDICINE | Facility: CLINIC | Age: 57
End: 2017-01-23

## 2017-01-23 DIAGNOSIS — G47.33 OBSTRUCTIVE SLEEP APNEA (ADULT) (PEDIATRIC): Primary | ICD-10-CM

## 2017-01-23 NOTE — PROGRESS NOTES
Patient was offered choice of vendor and chose [facility].  Patient Shira Rodriguez was set up at [facility]  on January 23, 2017. Patient received a Resmed AirCurve 10 Bilevel. Pressures were set at 19/13 cm H2O.   Patient s ramp is 5 cm H2O for Auto and FLEX/EPR is 3.  Patient received a Resmed Mask name: [id number]   Full Face mask Size Medium, heated tubing and heated humidifier.  Patient is enrolled in the STM Program and does not need to meet compliance.  Porsha Banda

## 2017-01-25 ENCOUNTER — OFFICE VISIT (OUTPATIENT)
Dept: ORTHOPEDICS | Facility: CLINIC | Age: 57
End: 2017-01-25
Payer: COMMERCIAL

## 2017-01-25 VITALS
BODY MASS INDEX: 47.09 KG/M2 | HEART RATE: 92 BPM | SYSTOLIC BLOOD PRESSURE: 157 MMHG | DIASTOLIC BLOOD PRESSURE: 101 MMHG | WEIGHT: 293 LBS | HEIGHT: 66 IN

## 2017-01-25 DIAGNOSIS — G56.01 CARPAL TUNNEL SYNDROME OF RIGHT WRIST: Primary | ICD-10-CM

## 2017-01-25 PROCEDURE — 99207 ZZC NO CHARGE LOS: CPT | Performed by: PREVENTIVE MEDICINE

## 2017-01-25 PROCEDURE — 20526 THER INJECTION CARP TUNNEL: CPT | Mod: RT | Performed by: PREVENTIVE MEDICINE

## 2017-01-25 ASSESSMENT — PAIN SCALES - GENERAL: PAINLEVEL: MODERATE PAIN (5)

## 2017-01-25 NOTE — PATIENT INSTRUCTIONS
Thanks for coming today.  Ortho/Sports Medicine Clinic  [address] [place] [address]    To schedule future appointments in Ortho Clinic, you may call [phone].    To schedule ordered imaging by your Provider: Call [facility] at [phone]    Aqdot available online at:   [email or web address]    Please call if any further questions or concerns [phone] and ask for the Orthopedic Department. Clinic hours 8 am to 5 pm.    Return to clinic if symptoms worsen.

## 2017-01-25 NOTE — PROGRESS NOTES
"Diagnosis right carpal tunnel syndrome  Patient returns requesting injection for CT as before.     Carpal Tunnel Injection -  The patient was informed of the risks and the benefits of the procedure and a written consent was signed.  The patient s wrist was prepped with chlorhexidine in sterile fashion.   40 mg of depo suspension was drawn up into a 3 mL syringe with 1.0 mL of 1% lidocaine and 1.0 mL of 0.5% marcaine.  Injection was performed using sterile technique.  Under ultrasound guidance a 5/8\" 25-gauge needle was used to enter the wrist at the distal palmar crease medial to the median nerve and inject  There were no complications. The patient tolerated the procedure well. There was negligible bleeding.   The patient was instructed to ice the wrist upon leaving clinic and refrain from overuse over the next 3 days.   The patient was instructed to call or go to the emergency room with any unusual pain, swelling, redness, or if otherwise concerned.  A follow up appointment will be scheduled to evaluate response to the injection, and to assess range of motion and pain.  DEPO-MEDROL NDC# 1826-1100-45 WAS INJECTED     Dr Saxena  "

## 2017-01-26 ENCOUNTER — DOCUMENTATION ONLY (OUTPATIENT)
Dept: SLEEP MEDICINE | Facility: CLINIC | Age: 57
End: 2017-01-26

## 2017-01-26 NOTE — PROGRESS NOTES
3 DAY STM VISIT    Patient contacted for 3 day STM visit  Subjective measures:  Patient has 3 days of use. Patient stated her machine isn't ramping and the pressure is to much to start at with.    Current settings: Bi-Level 19/13 cm H20.        Assessment:  Instructed patient it would be good for us to look at her machine and she will call if she is interested in doing this.   Action plan: Pt to have f/u 14 day  STM visit.

## 2017-01-31 ENCOUNTER — TELEPHONE (OUTPATIENT)
Dept: SLEEP MEDICINE | Facility: CLINIC | Age: 57
End: 2017-01-31

## 2017-02-02 ENCOUNTER — MYC MEDICAL ADVICE (OUTPATIENT)
Dept: RHEUMATOLOGY | Facility: CLINIC | Age: 57
End: 2017-02-02

## 2017-02-02 NOTE — TELEPHONE ENCOUNTER
Left message on pt's voice mail asking that Shira return call. MyChart message also sent to pt asking pt to call in order to schedule this appointment.

## 2017-02-03 NOTE — TELEPHONE ENCOUNTER
Call placed to pt and offered appointment with Dr Chandra on 2/20/17 at 7:30 am. Pt is agreeable to this date and time. Staff message sent to the pool asking for assistance in scheduling.

## 2017-02-06 NOTE — TELEPHONE ENCOUNTER
Pt had returned call and an appointment was offered and accepted with Dr Chandra on 2/20/17 at 7:30 am. Staff message had been sent to the pool asking for assistance in scheduling this appointment.

## 2017-02-07 ENCOUNTER — DOCUMENTATION ONLY (OUTPATIENT)
Dept: SLEEP MEDICINE | Facility: CLINIC | Age: 57
End: 2017-02-07

## 2017-02-07 ENCOUNTER — PRE VISIT (OUTPATIENT)
Dept: RHEUMATOLOGY | Facility: CLINIC | Age: 57
End: 2017-02-07

## 2017-02-07 NOTE — TELEPHONE ENCOUNTER
1.  Date/reason for appt: 2/20/17 - Borderline RF and CCP  2.  Referring provider: Dr. Busby  3.  Call to patient (Yes / No - short description): No, internal referral  4.  Previous care at / records requested from: [facility] -- Records and referral in Epic/Pacs. Labs are in Epic.

## 2017-02-07 NOTE — PROGRESS NOTES
14 DAY STM VISIT    Message left for patient to return call    Assessment: Pt not meeting objective benchmarks for compliance     Action plan: Waiting for patient to return call and pt to have 30 day STM visit.   Device settings:  19/13 cm H20.     Target EPAP (95% of Target) 14 day average (Resmed): cm H20      Objective measures: 14 day rolling measure      Compliance   (Goal >70%)  --% compliance greater than four hours rolling average 14 days: 7.1 %      Leak  (Goal < 24 lpm)  --95% OF Leak in litres Rolling Average 14 Days: 10.2 lpm last data upload         AHI  (Goal < 5)  --AHI Rolling Average 14 Day: 1.82  last data upload         Usage  (Goal >240)  --Time mask on face 14 day average: 133 min

## 2017-02-17 ENCOUNTER — PRE VISIT (OUTPATIENT)
Dept: CARDIOLOGY | Facility: CLINIC | Age: 57
End: 2017-02-17

## 2017-02-17 DIAGNOSIS — R03.0 ELEVATED BLOOD PRESSURE (NOT HYPERTENSION): Primary | ICD-10-CM

## 2017-02-20 ENCOUNTER — OFFICE VISIT (OUTPATIENT)
Dept: RHEUMATOLOGY | Facility: CLINIC | Age: 57
End: 2017-02-20
Attending: INTERNAL MEDICINE
Payer: COMMERCIAL

## 2017-02-20 VITALS
SYSTOLIC BLOOD PRESSURE: 178 MMHG | BODY MASS INDEX: 63.43 KG/M2 | HEART RATE: 79 BPM | TEMPERATURE: 98 F | OXYGEN SATURATION: 98 % | DIASTOLIC BLOOD PRESSURE: 108 MMHG | WEIGHT: 293 LBS

## 2017-02-20 DIAGNOSIS — R76.8 ELEVATED RHEUMATOID FACTOR: ICD-10-CM

## 2017-02-20 DIAGNOSIS — G56.03 BILATERAL CARPAL TUNNEL SYNDROME: Primary | ICD-10-CM

## 2017-02-20 PROCEDURE — 99213 OFFICE O/P EST LOW 20 MIN: CPT | Mod: ZF

## 2017-02-20 ASSESSMENT — ENCOUNTER SYMPTOMS
DIZZINESS: 1
INSOMNIA: 1
CHILLS: 0
INCREASED ENERGY: 0
HALLUCINATIONS: 0
DYSPNEA ON EXERTION: 1
POSTURAL DYSPNEA: 0
NIGHT SWEATS: 1
WHEEZING: 0
ORTHOPNEA: 0
PANIC: 0
PALPITATIONS: 0
HEMOPTYSIS: 0
WEIGHT LOSS: 0
LEG SWELLING: 0
ALTERED TEMPERATURE REGULATION: 1
TREMORS: 0
DECREASED APPETITE: 0
POLYDIPSIA: 0
DISTURBANCES IN COORDINATION: 0
TACHYCARDIA: 0
NUMBNESS: 1
SNORES LOUDLY: 1
POLYPHAGIA: 0
PARALYSIS: 0
CLAUDICATION: 0
SPEECH CHANGE: 0
SHORTNESS OF BREATH: 1
DECREASED CONCENTRATION: 0
LEG PAIN: 0
LOSS OF CONSCIOUSNESS: 0
SEIZURES: 0
SPUTUM PRODUCTION: 1
TINGLING: 0
FATIGUE: 1
SLEEP DISTURBANCES DUE TO BREATHING: 0
COUGH DISTURBING SLEEP: 1
HYPOTENSION: 0
LIGHT-HEADEDNESS: 1
EXERCISE INTOLERANCE: 1
WEAKNESS: 0
DEPRESSION: 1
COUGH: 1
HEADACHES: 0
HYPERTENSION: 1
FEVER: 0
SYNCOPE: 0
WEIGHT GAIN: 0
MEMORY LOSS: 0
NERVOUS/ANXIOUS: 0
RESPIRATORY PAIN: 0

## 2017-02-20 ASSESSMENT — PAIN SCALES - GENERAL: PAINLEVEL: NO PAIN (0)

## 2017-02-20 NOTE — MR AVS SNAPSHOT
After Visit Summary   2/20/2017    Shira Rodriguez    MRN: [id number]           Patient Information     Date Of Birth          1960        Visit Information        Provider Department      2/20/2017 7:30 AM Rosita Chandra MD [facility] Rheumatology        Today's Diagnoses     Bilateral carpal tunnel syndrome    -  1    Elevated rheumatoid factor           Follow-ups after your visit        Follow-up notes from your care team     Return in about 1 year (around 2/20/2018), or 6 mo- 12 mo.      Your next 10 appointments already scheduled     Apr 27, 2017  4:30 PM CDT   (Arrive by 4:15 PM)   Return Visit with Georgina Richardson MD   [facility] ([facility] [place] [facility])    [address] [place] [id number] [phone]            Oct 16, 2017  8:30 AM CDT   (Arrive by 8:15 AM)   Return Visit with Rosita Chandra MD   [facility] ([facility] [place])    [address] [place] [id number] [phone]              Who to contact     If you have questions or need follow up information about today's clinic visit or your schedule please contact [facility] RHEUMATOLOGY directly at [phone].  Normal or non-critical lab and imaging results will be communicated to you by [employer], letter or phone within 4 business days after the clinic has received the results. If you do not hear from us within 7 days, please contact the clinic through [employer] or phone. If you have a critical or abnormal lab result, we will notify you by phone as soon as possible.  Submit refill requests through [employer] or call your pharmacy and they will forward the refill request to us. Please allow 3 business days for your refill to be completed.          Additional Information About Your Visit        [employer] Information     [employer] gives you secure access to your electronic health record. If you see a  primary care provider, you can also send messages to your care team and make appointments. If you have questions, please call your primary care clinic.  If you do not have a primary care provider, please call [phone] and they will assist you.        Care EveryWhere ID     This is your Care EveryWhere ID. This could be used by other organizations to access your [place] medical records  [id number]        Your Vitals Were     Pulse Temperature Pulse Oximetry BMI (Body Mass Index)          79 98  F (36.7  C) (Oral) 98% 63.43 kg/m2         Blood Pressure from Last 3 Encounters:   03/06/17 112/78   02/20/17 (!) 178/108   01/25/17 (!) 157/101    Weight from Last 3 Encounters:   03/06/17 (!) 178.3 kg (393 lb)   02/20/17 (!) 178.3 kg (393 lb)   01/25/17 (!) 181 kg (399 lb)              Today, you had the following     No orders found for display       Primary Care Provider Office Phone # Fax #    Anjel Busby [phone]       [facility] [address] [place] [address]  [facility] [id number]        Thank you!     Thank you for choosing [facility] RHEUMATOLOGY  for your care. Our goal is always to provide you with excellent care. Hearing back from our patients is one way we can continue to improve our services. Please take a few minutes to complete the written survey that you may receive in the mail after your visit with us. Thank you!             Your Updated Medication List - Protect others around you: Learn how to safely use, store and throw away your medicines at www.disposemymeds.org.          This list is accurate as of: 2/20/17 11:59 PM.  Always use your most recent med list.                   Brand Name Dispense Instructions for use    albuterol 108 (90 BASE) MCG/ACT Inhaler    PROAIR HFA/PROVENTIL HFA/VENTOLIN HFA    1 Inhaler    Inhale 2 puffs into the lungs every 6 hours as needed for shortness of breath / dyspnea or wheezing       aspirin 81 MG tablet      Take 81 mg by mouth daily        atorvastatin 20 MG tablet    LIPITOR    90 tablet    Take 1 tablet (20 mg) by mouth daily       budesonide-formoterol 160-4.5 MCG/ACT Inhaler    SYMBICORT    1 Inhaler    Inhale 2 puffs into the lungs 2 times daily       fluocinonide 0.05 % ointment    LIDEX    60 g    Apply topically daily To hands and feet when dermatitis active       furosemide 20 MG tablet    LASIX    30 tablet    Take 1 tablet (20 mg) by mouth daily       lisinopril 10 MG tablet    PRINIVIL/ZESTRIL    90 tablet    Take 1 tablet (10 mg) by mouth daily       order for DME      Equipment being ordered: BIPAP 19/13 CM H20 AIRCURVE 10 AIRFIT F20 SN# [id number]   DN# [id number]       SERTRALINE HCL PO      Take 100 mg by mouth daily

## 2017-02-20 NOTE — PROGRESS NOTES
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, EMG 1/2017 c/w severe R side and moderate L side, improved s/p R CT inj (1/2017)  # mildly elevated RF, CCP (38/9) - 12/2016  # low pos Lyme Ab 1.57, confirmatory Western blot IgG/M neg - 12/2016  # numbness BL shins (L onset 2004 following L TKA, R onset 2015 following RLE injury)  # OA - s/p L TKA, R TAWNY  # fam hx of thyroid disease ?autoimmune (mother, sister)  # smoker x20y (1ppd until this past year)  # morbid obesity   # dyshidrotic eczema BL hands    Discussion/Plan:  Patient's onset of BL CTS with low level RF/CCP is concerning for early RA, but at this point, she clinically does not have RA (no arthritis/EMS symptoms, no synovitis or deformities on exam). She has a 20y smoking history, which significantly increases her risk for RA and for potential progression to RA given the presence of these antibodies. Smoking in combination with possible viral infection last December also could lead to development of low levels of these antibodies (RF, CCP, Lyme) which may fade/disappear.     Would recommend at this point that she work on quitting smoking, continue to work on weight reduction as she is, and to return in 6-12 months to discuss if she's had any \"flares\" in the interim (that sounds like RA flares) and to retest her RF/CCP antibodies. If the RF/CCP titers are increasing, this would be concerning for the development of true early RA. At that point we could discuss initiating her in clinical trial which is looking at initiation of HCQ in early RA pts (without clinically apparent RA symptoms). We did discuss this today and potential role for HCQ.     Plan:  - continue to work on smoking cessation  - RTC in 6-12m to retest RF/CCP   - based on above results, may " "consider HCQ, hold for now  - if CT symptoms return, could consider MRI of hand/wrist to look for synovitis, would then also likely benefit from repeat CT injection from ortho    Routine health maintenance:  HBVsAg: never had  HBVcore: never had  HCV: neg  HIV: never had  Q gold (or T spot): neer had  Vitamin D and calcium/bone health:   Cancer screenings (skin, breast, colon, pap): due for colonoscopy    Return in about 1 year (around 2/20/2018), or 6 mo- 12 mo.    No orders of the defined types were placed in this encounter.      Patient was seen and discussed with Dr. Renteria.     Rosita Chandra MD  Rheumatology Fellow  Pager [phone]          Active Problem List:     Patient Active Problem List    Diagnosis Date Noted     Elevated blood pressure (not hypertension) 01/04/2017     Priority: Medium     Degeneration of cervical intervertebral disc 12/26/2016     Priority: Medium     CYRUS (obstructive sleep apnea) 01/04/2017 1/11/2017(401#)-AHI 80, RDI 90, lowest oxygen saturation was 83%, bilevel PAP 19/13 cm/H20 effective.   1/30/2016-AHI 16.6, lowest oxygen saturation was 89%       Arthritis of knee, degenerative 06/14/2012     Morbid obesity (H) 01/11/2012     Adjustment disorder with depressed mood 10/27/2011     Tobacco use disorder 10/27/2011     Primary localized osteoarthrosis, pelvic region and thigh 06/02/2004            History of Present Illness:     Chief Complaint   Patient presents with     Consult     Borderline rf       Patient is a 57 y/o M/F who presents today for rheumatology referral from Dr. Anjel Busby for bilateral CTS and abnormal RF and CCP.     The patient reports around Thanksgiving (3m ago) she developed pain in both hands (R>>L) that was initially intermittent and switching hands. Pain was not limited to the wrist or small joints in the hand, included \"the entire hand.\" The pain then became constant (R>>L) and making it difficult to do tasks using her hands (for example if " "used remote then sharp pain in her thumb). She was seen by a chiropracter which did help some, but pain continued. Also wore night wrist splints which she keya made the pain actually worse. Developed numbness/tingling in both hands digits 1-3 as well. After visits to her PCP and a neurologist she underwent MRI c-spine which was normal and BL UE EMG's which did confirm moderate median neuropathy on L and severe on R. Throughout this, she has had several episodes of swelling in her entire feet/ankles. During the first episode she was given a medrol dosepak which resolved the swelling in her feet within 1-2d and improved the CTS bilaterally. She had a 2nd episode of foot swelling and her PCP then tried a course of lasix without improvement. She was given a 2nd medrol dosepak again with improvement in foot swelling. During episodes of foot swelling she reports some mild uncomfortable feeling while walking presumably due to the swelling itself, but not actual red/hot/swollen joints. No history of gout.     She was given a R carpal tunnel injection by ortho mid January which resolved the pain in her R hand. She has NO pain, but residual numbness in fingertips 1-3 of both hands which she states \"she can live with.\"     Additional ROS includes fatigue for the past year. She recently was diagnosed with CYRUS and has been trying to wear BIPAP at night but difficult to be compliant (wakes up and she had taken mask off in her sleep). Also notes numbness of BL shins (not circumferential). Numbness of L shin started following her L TKA (was told they may have injected the nerve with marcaine following surgery years ago). Numbness in her R shin started about 1.5y ago following a fall where she developed a large hematoma on R lateral leg that required drainage. She is worried her nerves \"do not heal correctly.\"     She otherwise denies any EMS, pain/swelling/rednesss located in joints. Does have some mild dry mouth, but no dysphagia " or difficulty chewing. No dry eyes. See rest of ROS below. Is a smoker x20y and has previously tried to quit (wellbutrin). Has also seen [facility] for weight loss - has lost about 12 lb.            Review of Systems:   Complete ROS negative except for symptoms mentioned in the HPI     No h/o  myalgia, arthralgia, unintentional weight loss, loss of appetite, fevers, swollen glands + fatigue, dyshidrotic eczema BL hands  No h/o gout  No family or personal history of psoriasis, UC or crohn's disease. No h/o iritis. No enthesitis, plantar fascitis.   Patient denies any raynauds, malar rash, photosensitivity, recurrent mouth/genital ulcers, sicca symptoms (+mild dry mouth), recurrent sinusitis/rhinitis  No h/o recurrent miscarriages or arterial/venous thrombosis in the past  No h/o chest pain + SOB with exertion, chronic cough occasionally productive (no hemoptysis)  No h/o persistent nausea, vomiting, constipation, diarrhea, abdominal pain +2 episodes band-like abdominal pain resolved on own   No h/o hematochezia, hematuria, hemoptysis, hematemesis  No h/o seizures or strokes  No h/o cancer         Past Medical History:     Past Medical History   Diagnosis Date     Contact dermatitis      Cough 7/30/2015     Eczema      HANDS     History of total hip replacement 10/27/2011     Knee pain, left      Osteoarthrosis      Secondary localized osteoarthrosis, pelvic region and thigh 7/12/04     Hospitalized     SOB (shortness of breath) 7/30/2015     Traumatic arthropathy      Past Surgical History   Procedure Laterality Date     C total hip arthroplasty  07/09/04     RT     Hip arthroscopy[       Arthroplasty knee  6/14/2012     Procedure: ARTHROPLASTY KNEE;  Left Total Knee Arthroplasty;  Surgeon: Annette Quesada MD;  Location: [facility] OR            Social History:     Social History     Occupational History     Not on file.     Social History Main Topics     Smoking status: Current Every Day Smoker     Packs/day: 0.75      Years: 33.00     Types: Cigarettes     Start date: 1/1/1982     Smokeless tobacco: Never Used     Alcohol use 0.0 oz/week     0 Standard drinks or equivalent per week      Comment: OCCASSIONALLY 2 DRINKS PER MONTH     Drug use: No     Sexual activity: Not on file            Family History:     Family History   Problem Relation Age of Onset     Thyroid Disease Mother      sister     Circulatory Mother      CVA p endarderectomy     Hypertension Mother      Breast Cancer Paternal Grandmother      CANCER Paternal Grandmother      pancreatic     Cardiovascular Paternal Aunt      Cardiovascular Paternal Uncle      Depression Other      Skin Cancer Mother      MMohs surgery with skin graft   Both mother and sister with thyroid disease, she is unsure if this is autoimmune     No fam hx of RA, SLE, type 1 DM         Allergies:     Allergies   Allergen Reactions     Adhesive Tape Blisters     Erythromycin Rash            Medications:     Current Outpatient Prescriptions   Medication Sig Dispense Refill     order for DME Equipment being ordered: BIPAP  19/13 CM H20  AIRCURVE 10  AIRFIT F20  # [id number]   DN# [id number]       aspirin 81 MG tablet Take 81 mg by mouth daily       atorvastatin (LIPITOR) 20 MG tablet Take 1 tablet (20 mg) by mouth daily 90 tablet 3     SERTRALINE HCL PO Take 100 mg by mouth daily        albuterol (PROAIR HFA/PROVENTIL HFA/VENTOLIN HFA) 108 (90 BASE) MCG/ACT Inhaler Inhale 2 puffs into the lungs every 6 hours as needed for shortness of breath / dyspnea or wheezing 1 Inhaler 0     budesonide-formoterol (SYMBICORT) 160-4.5 MCG/ACT inhaler Inhale 2 puffs into the lungs 2 times daily 1 Inhaler 1     meloxicam (MOBIC) 15 MG tablet Take 1 tablet (15 mg) by mouth daily 30 tablet 1     lisinopril (PRINIVIL/ZESTRIL) 10 MG tablet Take 1 tablet (10 mg) by mouth daily 90 tablet 3     furosemide (LASIX) 20 MG tablet Take 1 tablet (20 mg) by mouth daily (Patient not taking: Reported on 2/20/2017) 30 tablet 0      fluocinonide (LIDEX) 0.05 % ointment Apply topically daily To hands and feet when dermatitis active (Patient not taking: Reported on 2/20/2017) 60 g 2            Physical Exam:   Blood pressure (!) 178/108, pulse 79, temperature 98  F (36.7  C), temperature source Oral, weight (!) 178.3 kg (393 lb), SpO2 98 %, not currently breastfeeding.  Wt Readings from Last 4 Encounters:   02/20/17 (!) 178.3 kg (393 lb)   01/25/17 (!) 181 kg (399 lb)   01/19/17 (!) 181.3 kg (399 lb 9.6 oz)   01/17/17 (!) 180.1 kg (397 lb)     BP Readings from Last 3 Encounters:   02/20/17 (!) 178/108   01/25/17 (!) 157/101   01/19/17 162/90       Constitutional: well-developed, appearing stated age, cooperative  Eyes: PERRLA, normal conjunctiva, sclera  ENT: nl external ears, nose, lips. No mucous membrane lesions, normal saliva pool  Neck: no cervical or supraclavicular lymphadenopathy, no parotid swelling, normal palpable thyroid  Resp: wheezing scattered throughout BL lungs, breathing comfortably on room air  CV: RRR, no m/r/g, no LE edema  GI: soft, NT/ND, +BS, no HSM, morbidly obese  : not tested  Lymph: no cervical, supraclavicular or epitrochlear nodes  MSK: All joints including neck, shoulder, elbow, wrist, MCP/PIP/DIP, hip, knee, ankle, and foot MTP/PIP/DIP joints examined and found normal w/o synovitis or deformity.   Full ROM.  Fist 100%.    No dactylitis,  tenosynovitis, enthesopathy.  Skin: no nail pitting; no nodules, dry scaly changes with petechiae on BL palmar surface hands  Psych: nl judgement, orientation, memory, affect.  Neuro: CN II-XII grossly intact, moving all extremities equally, normal gait. Normal  strength, BL UE strength 5/5         Data:     Results for orders placed or performed in visit on 01/16/17   EKG 12-lead, tracing only (Future)   Result Value Ref Range    Interpretation ECG Click View Image link to view waveform and result      EMG 1/2017 - severe R side and moderate L side median neuropathy at wrist  c/w BL CTS    Recent BMP, CBC normal  TSH 2016 nl  UA  nl other than 7 RBC  HCV negative  RF 38, CCP 9  CRP 11.3, ESR 10  Lyme Ab 1.57 (+), confirmatory IgG/IgM neg  EMERY neg    MRI c-spine - mild-mod DDD  XR BL knees  - c/w OA    EMG 2017:  Results:  Nerve conduction studies:  1. Right median-D2 sensory response is absent.    2. Left median-D2 sensory response shows moderately slowed CV and  moderately reduced amplitude.    3. Bilateral ulnar-D5 and radial sensory responses were normal.    4. Left median ulnar palmar interlatency difference was  prolonged.    5. Right median-APB motor response showed moderately prolonged  DL, mildly reduced amplitude, and normal CV in the forearm.    6. Left median-APB motor response showed mildly prolonged DL,  normal amplitude, and normal CV in the forearm.    7. Bilateral ulnar-ADM motor responses were normal.    8. Right median-lumbrical compared to ulnar-interossei  interlatency difference was prolonged.     Needle EM. Fibrillation potentials and positive sharp waves were seen in  the right APB muscle.    2. Large amplitude and/or long duration motor unit potentials  (MUP) with increased polyphasia were seen in the bilateral APB  muscles.    3. Rapidly firing MUPs with reduced recruitment were seen in the  bilateral APB muscles.      Interpretation:  This is an abnormal study. There is electrophysiologic evidence  of a severe right-sided and a moderate left-sided median  neuropathy at the wrist (e.g., bilateral carpal tunnel syndrome).  Clinical correlation is recommended.      Rosita Chandra MD    Reviewed all relevant data including labs and imaging.     I saw the patient with the fellow.  My exam and recomendations are as described. I spent 30 mins. With the pt. Over 50% in counselling      Misael Renteria MD

## 2017-02-20 NOTE — LETTER
"2/20/2017       RE: Shira Rodriguez  [address] [facility] [address]     Dear Colleague,    Thank you for referring your patient, Shira Rodriguez, to the [facility]. Please see a copy of my visit note below.    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, EMG 1/2017 c/w severe R side and moderate L side, improved s/p R CT inj (1/2017)  # mildly elevated RF, CCP (38/9) - 12/2016  # low pos Lyme Ab 1.57, confirmatory Western blot IgG/M neg - 12/2016  # numbness BL shins (L onset 2004 following L TKA, R onset 2015 following RLE injury)  # OA - s/p L TKA, R TAWNY  # fam hx of thyroid disease ?autoimmune (mother, sister)  # smoker x20y (1ppd until this past year)  # morbid obesity   # dyshidrotic eczema BL hands    Discussion/Plan:  Patient's onset of BL CTS with low level RF/CCP is concerning for early RA, but at this point, she clinically does not have RA (no arthritis/EMS symptoms, no synovitis or deformities on exam). She has a 20y smoking history, which significantly increases her risk for RA and for potential progression to RA given the presence of these antibodies. Smoking in combination with possible viral infection last December also could lead to development of low levels of these antibodies (RF, CCP, Lyme) which may fade/disappear.     Would recommend at this point that she work on quitting smoking, continue to work on weight reduction as she is, and to return in 6-12 months to discuss if she's had any \"flares\" in the interim (that sounds like RA flares) and to retest her RF/CCP antibodies. If the RF/CCP titers are increasing, this would be concerning for the development of true early RA. At that point we could discuss initiating her in " clinical trial which is looking at initiation of HCQ in early RA pts (without clinically apparent RA symptoms). We did discuss this today and potential role for HCQ.     Plan:  - continue to work on smoking cessation  - RTC in 6-12m to retest RF/CCP   - based on above results, may consider HCQ, hold for now  - if CT symptoms return, could consider MRI of hand/wrist to look for synovitis, would then also likely benefit from repeat CT injection from ortho    Routine health maintenance:  HBVsAg: never had  HBVcore: never had  HCV: neg  HIV: never had  Q gold (or T spot): neer had  Vitamin D and calcium/bone health:   Cancer screenings (skin, breast, colon, pap): due for colonoscopy    Return in about 1 year (around 2/20/2018), or 6 mo- 12 mo.    No orders of the defined types were placed in this encounter.      Patient was seen and discussed with Dr. Renteria.     Rosita Chandra MD  Rheumatology Fellow  Pager [phone]          Active Problem List:     Patient Active Problem List    Diagnosis Date Noted     Elevated blood pressure (not hypertension) 01/04/2017     Priority: Medium     Degeneration of cervical intervertebral disc 12/26/2016     Priority: Medium     CYRUS (obstructive sleep apnea) 01/04/2017 1/11/2017(401#)-AHI 80, RDI 90, lowest oxygen saturation was 83%, bilevel PAP 19/13 cm/H20 effective.   1/30/2016-AHI 16.6, lowest oxygen saturation was 89%       Arthritis of knee, degenerative 06/14/2012     Morbid obesity (H) 01/11/2012     Adjustment disorder with depressed mood 10/27/2011     Tobacco use disorder 10/27/2011     Primary localized osteoarthrosis, pelvic region and thigh 06/02/2004            History of Present Illness:     Chief Complaint   Patient presents with     Consult     Borderline rf       Patient is a 55 y/o M/F who presents today for rheumatology referral from Dr. Anjel Busby for bilateral CTS and abnormal RF and CCP.     The patient reports around Thanksgiving (3m ago) she  "developed pain in both hands (R>>L) that was initially intermittent and switching hands. Pain was not limited to the wrist or small joints in the hand, included \"the entire hand.\" The pain then became constant (R>>L) and making it difficult to do tasks using her hands (for example if used remote then sharp pain in her thumb). She was seen by a chiropracter which did help some, but pain continued. Also wore night wrist splints which she keya made the pain actually worse. Developed numbness/tingling in both hands digits 1-3 as well. After visits to her PCP and a neurologist she underwent MRI c-spine which was normal and BL UE EMG's which did confirm moderate median neuropathy on L and severe on R. Throughout this, she has had several episodes of swelling in her entire feet/ankles. During the first episode she was given a medrol dosepak which resolved the swelling in her feet within 1-2d and improved the CTS bilaterally. She had a 2nd episode of foot swelling and her PCP then tried a course of lasix without improvement. She was given a 2nd medrol dosepak again with improvement in foot swelling. During episodes of foot swelling she reports some mild uncomfortable feeling while walking presumably due to the swelling itself, but not actual red/hot/swollen joints. No history of gout.     She was given a R carpal tunnel injection by ortho mid January which resolved the pain in her R hand. She has NO pain, but residual numbness in fingertips 1-3 of both hands which she states \"she can live with.\"     Additional ROS includes fatigue for the past year. She recently was diagnosed with CYRUS and has been trying to wear BIPAP at night but difficult to be compliant (wakes up and she had taken mask off in her sleep). Also notes numbness of BL shins (not circumferential). Numbness of L shin started following her L TKA (was told they may have injected the nerve with marcaine following surgery years ago). Numbness in her R shin started " "about 1.5y ago following a fall where she developed a large hematoma on R lateral leg that required drainage. She is worried her nerves \"do not heal correctly.\"     She otherwise denies any EMS, pain/swelling/rednesss located in joints. Does have some mild dry mouth, but no dysphagia or difficulty chewing. No dry eyes. See rest of ROS below. Is a smoker x20y and has previously tried to quit (wellbutrin). Has also seen [employer] for weight loss - has lost about 12 lb.            Review of Systems:   Complete ROS negative except for symptoms mentioned in the HPI     No h/o  myalgia, arthralgia, unintentional weight loss, loss of appetite, fevers, swollen glands + fatigue, dyshidrotic eczema BL hands  No h/o gout  No family or personal history of psoriasis, UC or crohn's disease. No h/o iritis. No enthesitis, plantar fascitis.   Patient denies any raynauds, malar rash, photosensitivity, recurrent mouth/genital ulcers, sicca symptoms (+mild dry mouth), recurrent sinusitis/rhinitis  No h/o recurrent miscarriages or arterial/venous thrombosis in the past  No h/o chest pain + SOB with exertion, chronic cough occasionally productive (no hemoptysis)  No h/o persistent nausea, vomiting, constipation, diarrhea, abdominal pain +2 episodes band-like abdominal pain resolved on own   No h/o hematochezia, hematuria, hemoptysis, hematemesis  No h/o seizures or strokes  No h/o cancer         Past Medical History:     Past Medical History   Diagnosis Date     Contact dermatitis      Cough 7/30/2015     Eczema      HANDS     History of total hip replacement 10/27/2011     Knee pain, left      Osteoarthrosis      Secondary localized osteoarthrosis, pelvic region and thigh 7/12/04     Hospitalized     SOB (shortness of breath) 7/30/2015     Traumatic arthropathy      Past Surgical History   Procedure Laterality Date     C total hip arthroplasty  07/09/04     RT     Hip arthroscopy[       Arthroplasty knee  6/14/2012     Procedure: " ARTHROPLASTY KNEE;  Left Total Knee Arthroplasty;  Surgeon: Annette Quesada MD;  Location: [facility] OR            Social History:     Social History     Occupational History     Not on file.     Social History Main Topics     Smoking status: Current Every Day Smoker     Packs/day: 0.75     Years: 33.00     Types: Cigarettes     Start date: 1/1/1982     Smokeless tobacco: Never Used     Alcohol use 0.0 oz/week     0 Standard drinks or equivalent per week      Comment: OCCASSIONALLY 2 DRINKS PER MONTH     Drug use: No     Sexual activity: Not on file            Family History:     Family History   Problem Relation Age of Onset     Thyroid Disease Mother      sister     Circulatory Mother      CVA p endarderectomy     Hypertension Mother      Breast Cancer Paternal Grandmother      CANCER Paternal Grandmother      pancreatic     Cardiovascular Paternal Aunt      Cardiovascular Paternal Uncle      Depression Other      Skin Cancer Mother      MMohs surgery with skin graft   Both mother and sister with thyroid disease, she is unsure if this is autoimmune     No fam hx of RA, SLE, type 1 DM         Allergies:     Allergies   Allergen Reactions     Adhesive Tape Blisters     Erythromycin Rash            Medications:     Current Outpatient Prescriptions   Medication Sig Dispense Refill     order for DME Equipment being ordered: BIPAP  19/13 CM H20  AIRCURVE 10  AIRFIT F20  SN# [id number]   DN# [id number]       aspirin 81 MG tablet Take 81 mg by mouth daily       atorvastatin (LIPITOR) 20 MG tablet Take 1 tablet (20 mg) by mouth daily 90 tablet 3     SERTRALINE HCL PO Take 100 mg by mouth daily        albuterol (PROAIR HFA/PROVENTIL HFA/VENTOLIN HFA) 108 (90 BASE) MCG/ACT Inhaler Inhale 2 puffs into the lungs every 6 hours as needed for shortness of breath / dyspnea or wheezing 1 Inhaler 0     budesonide-formoterol (SYMBICORT) 160-4.5 MCG/ACT inhaler Inhale 2 puffs into the lungs 2 times daily 1 Inhaler 1     meloxicam (MOBIC) 15  MG tablet Take 1 tablet (15 mg) by mouth daily 30 tablet 1     lisinopril (PRINIVIL/ZESTRIL) 10 MG tablet Take 1 tablet (10 mg) by mouth daily 90 tablet 3     furosemide (LASIX) 20 MG tablet Take 1 tablet (20 mg) by mouth daily (Patient not taking: Reported on 2/20/2017) 30 tablet 0     fluocinonide (LIDEX) 0.05 % ointment Apply topically daily To hands and feet when dermatitis active (Patient not taking: Reported on 2/20/2017) 60 g 2            Physical Exam:   Blood pressure (!) 178/108, pulse 79, temperature 98  F (36.7  C), temperature source Oral, weight (!) 178.3 kg (393 lb), SpO2 98 %, not currently breastfeeding.  Wt Readings from Last 4 Encounters:   02/20/17 (!) 178.3 kg (393 lb)   01/25/17 (!) 181 kg (399 lb)   01/19/17 (!) 181.3 kg (399 lb 9.6 oz)   01/17/17 (!) 180.1 kg (397 lb)     BP Readings from Last 3 Encounters:   02/20/17 (!) 178/108   01/25/17 (!) 157/101   01/19/17 162/90       Constitutional: well-developed, appearing stated age, cooperative  Eyes: PERRLA, normal conjunctiva, sclera  ENT: nl external ears, nose, lips. No mucous membrane lesions, normal saliva pool  Neck: no cervical or supraclavicular lymphadenopathy, no parotid swelling, normal palpable thyroid  Resp: wheezing scattered throughout BL lungs, breathing comfortably on room air  CV: RRR, no m/r/g, no LE edema  GI: soft, NT/ND, +BS, no HSM, morbidly obese  : not tested  Lymph: no cervical, supraclavicular or epitrochlear nodes  MSK: All joints including neck, shoulder, elbow, wrist, MCP/PIP/DIP, hip, knee, ankle, and foot MTP/PIP/DIP joints examined and found normal w/o synovitis or deformity.   Full ROM.  Fist 100%.    No dactylitis,  tenosynovitis, enthesopathy.  Skin: no nail pitting; no nodules, dry scaly changes with petechiae on BL palmar surface hands  Psych: nl judgement, orientation, memory, affect.  Neuro: CN II-XII grossly intact, moving all extremities equally, normal gait. Normal  strength, BL UE strength           Data:     Results for orders placed or performed in visit on 17   EKG 12-lead, tracing only (Future)   Result Value Ref Range    Interpretation ECG Click View Image link to view waveform and result      EMG 2017 - severe R side and moderate L side median neuropathy at wrist c/w BL CTS    Recent BMP, CBC normal  TSH 2016 nl  UA  nl other than 7 RBC  HCV negative  RF 38, CCP 9  CRP 11.3, ESR 10  Lyme Ab 1.57 (+), confirmatory IgG/IgM neg  EMERY neg    MRI c-spine - mild-mod DDD  XR BL knees  - c/w OA    EMG 2017:  Results:  Nerve conduction studies:  1. Right median-D2 sensory response is absent.    2. Left median-D2 sensory response shows moderately slowed CV and  moderately reduced amplitude.    3. Bilateral ulnar-D5 and radial sensory responses were normal.    4. Left median ulnar palmar interlatency difference was  prolonged.    5. Right median-APB motor response showed moderately prolonged  DL, mildly reduced amplitude, and normal CV in the forearm.    6. Left median-APB motor response showed mildly prolonged DL,  normal amplitude, and normal CV in the forearm.    7. Bilateral ulnar-ADM motor responses were normal.    8. Right median-lumbrical compared to ulnar-interossei  interlatency difference was prolonged.     Needle EM. Fibrillation potentials and positive sharp waves were seen in  the right APB muscle.    2. Large amplitude and/or long duration motor unit potentials  (MUP) with increased polyphasia were seen in the bilateral APB  muscles.    3. Rapidly firing MUPs with reduced recruitment were seen in the  bilateral APB muscles.      Interpretation:  This is an abnormal study. There is electrophysiologic evidence  of a severe right-sided and a moderate left-sided median  neuropathy at the wrist (e.g., bilateral carpal tunnel syndrome).  Clinical correlation is recommended.      Rosita Chandra MD    Reviewed all relevant data including labs and imaging.     I saw  the patient with the fellow.  My exam and recomendations are as described. I spent 30 mins. With the pt. Over 50% in counselling      Misael Renteria MD      Again, thank you for allowing me to participate in the care of your patient.      Sincerely,    Rosita Chandra MD

## 2017-02-20 NOTE — NURSING NOTE
"Chief Complaint   Patient presents with     Consult     Borderline rf       Initial BP (!) 178/108 (BP Location: Right arm, Patient Position: Chair, Cuff Size: Adult Regular)  Pulse 79  Temp 98  F (36.7  C) (Oral)  Wt (!) 178.3 kg (393 lb)  SpO2 98%  BMI 63.43 kg/m2 Estimated body mass index is 63.43 kg/(m^2) as calculated from the following:    Height as of 1/25/17: 1.676 m (5' 6\").    Weight as of this encounter: 178.3 kg (393 lb).  Medication Reconciliation: complete    "

## 2017-02-23 ENCOUNTER — DOCUMENTATION ONLY (OUTPATIENT)
Dept: SLEEP MEDICINE | Facility: CLINIC | Age: 57
End: 2017-02-23

## 2017-02-23 NOTE — PROGRESS NOTES
30 DAY [facility] VISIT    Message left for patient to return call     Assessment: Pt not meeting objective benchmarks for compliance.     Action plan: Waiting for patient to return call. Pt place into sleep therapy coaching group.     Device settings:    19/13 cm H20    Objective measures: 14 day rolling measures     Compliance   (Goal >70%)  --% compliance greater than four hours rolling average 14 days: 50 %      Leak   (Goal < 24 lpm) --95% OF Leak in litres Rolling Average 14 Days: 4.54 lpm  last data upload      AHI  (Goal < 5)  --AHI Rolling Average 14 Day: 2.42 last data upload        Usage  (Goal >240)  --Time mask on face 14 day average: 249 min

## 2017-03-06 ENCOUNTER — OFFICE VISIT (OUTPATIENT)
Dept: PULMONOLOGY | Facility: CLINIC | Age: 57
End: 2017-03-06
Attending: INTERNAL MEDICINE
Payer: COMMERCIAL

## 2017-03-06 VITALS
TEMPERATURE: 98.3 F | OXYGEN SATURATION: 95 % | BODY MASS INDEX: 47.09 KG/M2 | SYSTOLIC BLOOD PRESSURE: 112 MMHG | HEIGHT: 66 IN | HEART RATE: 93 BPM | RESPIRATION RATE: 18 BRPM | WEIGHT: 293 LBS | DIASTOLIC BLOOD PRESSURE: 78 MMHG

## 2017-03-06 DIAGNOSIS — R06.09 DOE (DYSPNEA ON EXERTION): ICD-10-CM

## 2017-03-06 DIAGNOSIS — Z72.0 TOBACCO ABUSE: Primary | ICD-10-CM

## 2017-03-06 PROCEDURE — 99212 OFFICE O/P EST SF 10 MIN: CPT | Mod: ZF

## 2017-03-06 ASSESSMENT — PAIN SCALES - GENERAL: PAINLEVEL: NO PAIN (0)

## 2017-03-06 NOTE — NURSING NOTE
Chief Complaint   Patient presents with     RECHECK     Follow up on Shira and her SOB     Timothy Ramirez CMA at 5:55 PM on 3/6/2017

## 2017-03-07 NOTE — PROGRESS NOTES
"Reason for Visit  Shira Rodriguez is a 56 year old female who is followed for dyspnea, nicotine dependence  Pulmonary HPI  Since our last visit she has started CPAP therapy. At the last visit I prescribed Symbicort for chronic bronchitis.  Smoking a pack a day, mostly in the time between an 8 hour work day and sleep. She is ready to try to quit smoking, chronic cough and bronchitis worse lately. Since starting BPAP cough is more productive, maybe related to increasing humidity on PAP. She went to [facility] and got some help with portion control, behavioral techniques, nutrition. Worse coughing \"fits\" with abdominal pain/strain. She is getting a cardiac workup, stress test canceled because of caffeine.     The patient was seen and examined by Georgina Richardson MD   Current Outpatient Prescriptions   Medication     order for DME     aspirin 81 MG tablet     meloxicam (MOBIC) 15 MG tablet     lisinopril (PRINIVIL/ZESTRIL) 10 MG tablet     atorvastatin (LIPITOR) 20 MG tablet     SERTRALINE HCL PO     furosemide (LASIX) 20 MG tablet     albuterol (PROAIR HFA/PROVENTIL HFA/VENTOLIN HFA) 108 (90 BASE) MCG/ACT Inhaler     fluocinonide (LIDEX) 0.05 % ointment     budesonide-formoterol (SYMBICORT) 160-4.5 MCG/ACT inhaler     No current facility-administered medications for this visit.      Allergies   Allergen Reactions     Adhesive Tape Blisters     Erythromycin Rash     Social History     Social History     Marital status: Single     Spouse name: N/A     Number of children: N/A     Years of education: N/A     Occupational History     Not on file.     Social History Main Topics     Smoking status: Current Every Day Smoker     Packs/day: 0.75     Years: 33.00     Types: Cigarettes     Start date: 1/1/1982     Smokeless tobacco: Never Used     Alcohol use 0.0 oz/week     0 Standard drinks or equivalent per week      Comment: OCCASSIONALLY 2 DRINKS PER MONTH     Drug use: No     Sexual activity: Not on file     Other " "Topics Concern     Not on file     Social History Narrative     Past Medical History   Diagnosis Date     Contact dermatitis      Cough 7/30/2015     Eczema      HANDS     History of total hip replacement 10/27/2011     Knee pain, left      Osteoarthrosis      Secondary localized osteoarthrosis, pelvic region and thigh 7/12/04     Hospitalized     SOB (shortness of breath) 7/30/2015     Traumatic arthropathy      Past Surgical History   Procedure Laterality Date     C total hip arthroplasty  07/09/04     RT     Hip arthroscopy[       Arthroplasty knee  6/14/2012     Procedure: ARTHROPLASTY KNEE;  Left Total Knee Arthroplasty;  Surgeon: Annette Quesada MD;  Location: [facility] OR     Family History   Problem Relation Age of Onset     Thyroid Disease Mother      sister     Circulatory Mother      CVA p endarderectomy     Hypertension Mother      Skin Cancer Mother      MMohs surgery with skin graft     Breast Cancer Paternal Grandmother      CANCER Paternal Grandmother      pancreatic     Cardiovascular Paternal Aunt      Cardiovascular Paternal Uncle      Depression Other      ROS Pulmonary  Dyspnea: Yes, Cough: Yes, Chest pain: No, Wheezing: Yes, Sputum Production: Yes, Hemoptysis: No  A complete ROS was otherwise negative except as noted in the HPI.  /78  Pulse 93  Temp 98.3  F (36.8  C) (Oral)  Resp 18  Ht 1.676 m (5' 6\")  Wt (!) 178.3 kg (393 lb)  SpO2 95%  BMI 63.43 kg/m2  Exam:   GENERAL APPEARANCE: Well developed, well nourished, alert, and in no apparent distress.  EYES: PERRL, EOMI  HENT: Nasal mucosa with no edema and no hyperemia. No nasal polyps.  EARS: Canals clear, TMs normal  MOUTH: Oral mucosa is moist, without any lesions, no tonsillar enlargement, no oropharyngeal exudate.  NECK: supple, no masses, no thyromegaly.  LYMPHATICS: No significant axillary, cervical, or supraclavicular nodes.  RESP: normal percussion, good air flow throughout.  No crackles. No rhonchi. No wheezes.  CV: Normal S1, S2, " regular rhythm, normal rate. No murmur.  No rub. No gallop. No LE edema.   ABDOMEN:  Bowel sounds normal, soft, nontender, no HSM or masses.   MS: right leg with swollen and hard hematoma; no induration or erythema. No clubbing. No cyanosis.  SKIN: no rash on limited exam  NEURO: Mentation intact, speech normal, normal strength and tone, normal gait and stance  PSYCH: mentation appears normal. and affect normal/bright  Results:  PFTs 7/31/15 show mild ventilatory defect, borderline restriction with no evidence of obstruction, normal diffusion, no bronchodilator change  CXR 9/21/15 clear lung fields, no hyperexpansion    Assessment and plan: Shira is a 56-year-old female who is seen for chronic cough.  She is a smoker and she is morbidly obese.  She also has obstructive sleep apnea that was diagnosed 10 years ago but only recently being treated.  She has PFTs that are mildly abnormal and likely related to extrinsic restriction from her body habitus.      1.  Chronic bronchitis. There is no indication of obstruction on the PFTs.  However, she does have chronic bronchitis on the basis of having a chronic productive cough every day for several years. She doesn't think it's worse since starting lisinopril. She hadn't started Symbicort, but will do so.      2.  Nicotine dependence.  Nicotine gum prescribed. We spent most of the time today discussing how to quit smoking.     3.  Obstructive sleep apnea.  She underwent split-night sleep study in Jan 2017 and is now on PAP therapy.    Total visit time 60, over 50% of which (45 minutes) was spent in counseling and/or coordination of care.

## 2017-03-07 NOTE — PATIENT INSTRUCTIONS
"Start Symbicort, every day twice a day (albuterol as needed)  Try melatonin for sleep (don't take it later than 8pm)  Set a Quit Date - April 1  Clean the car and home before the quit date and make them smoke free zones  Keep gum, hard candy and crunchy snacks on hand to keep mouth and hands busy (carrots, celery)    Nicotine Gum (Nicorette, polacrilex nicotine gum)      Dosing:    >25 cigarettes per day Dose   Weeks 1-6 Chew 1 piece of 4 mg gum every 1-2 hours. Maximum of 24 pieces a day.   Weeks 7-9 Chew 1 piece of 4 mg gum every 2-4 hours. Maximum of 24 pieces a day.   Weeks 10-12 Chew 1 piece of 4 mg gum every 4-8 hours. Maximum of 24 pieces a day.   < 25 cigarettes per day    Weeks 1-6 Chew 1 piece of 2 mg gum every 1-2 hours. Maximum of 24 pieces a day.   Weeks 7-9 Chew 1 piece of 2 mg gum every 2-4 hours. Maximum of 24 pieces a day.   Weeks 10-12 Chew 1 piece of 2 mg gum every 4-8 hours. Maximum of 24 pieces a day.     How to use nicotine gum:    Chew the gum slowly until you notice a tingly feeling or peppery taste.     Then \"park\" the gum between your teeth and your cheek and let it sit there until you don't notice the tingly feeling or taste anymore.     Chew the gum slowly again until you notice the tingly feeling or peppery taste again and \"park\" the gum on the other side of your mouth.     Repeat this process for 30 minutes, then throw the gum away.     Especially at the beginning, use the gum whenever you would normally smoke a cigarette.    Some tips:    Do not smoke while you are using nicotine gum.     Do not swallow the gum.     Do not chew the gum like normal gum--you will swallow the nicotine instead of absorbing it. You are also more likely to get indigestion or nausea.     Do not drink anything, including water, while you are chewing gum.     Avoid acidic drinks like coffee and pop right before chewing the gum.     As your body becomes less dependent on nicotine, you will need to chew less " gum.     Follow up with your health care provider if you have any questions and also to help taper your nicotine gum dose.    Side Effects:  Some people may experience some indigestion or nausea. Using proper chewing technique may help. If you experience any other troublesome or unusual side effects, call your health care provider.

## 2017-03-14 DIAGNOSIS — G56.03 BILATERAL CARPAL TUNNEL SYNDROME: ICD-10-CM

## 2017-03-22 RX ORDER — MELOXICAM 15 MG/1
15 TABLET ORAL DAILY
Qty: 30 TABLET | Refills: 1 | Status: SHIPPED | OUTPATIENT
Start: 2017-03-22 | End: 2017-04-21

## 2017-04-11 ENCOUNTER — OFFICE VISIT (OUTPATIENT)
Dept: FAMILY MEDICINE | Facility: CLINIC | Age: 57
End: 2017-04-11

## 2017-04-11 VITALS
BODY MASS INDEX: 61.33 KG/M2 | WEIGHT: 293 LBS | HEART RATE: 75 BPM | DIASTOLIC BLOOD PRESSURE: 92 MMHG | SYSTOLIC BLOOD PRESSURE: 154 MMHG | RESPIRATION RATE: 16 BRPM

## 2017-04-11 DIAGNOSIS — Z12.11 SCREEN FOR COLON CANCER: ICD-10-CM

## 2017-04-11 DIAGNOSIS — R10.84 ABDOMINAL PAIN, GENERALIZED: ICD-10-CM

## 2017-04-11 DIAGNOSIS — Z12.31 VISIT FOR SCREENING MAMMOGRAM: ICD-10-CM

## 2017-04-11 DIAGNOSIS — R79.89 ELEVATED LFTS: ICD-10-CM

## 2017-04-11 DIAGNOSIS — R10.84 ABDOMINAL PAIN, GENERALIZED: Primary | ICD-10-CM

## 2017-04-11 LAB
ALBUMIN SERPL-MCNC: 3 G/DL (ref 3.4–5)
ALBUMIN UR-MCNC: NEGATIVE MG/DL
ALP SERPL-CCNC: 476 U/L (ref 40–150)
ALT SERPL W P-5'-P-CCNC: 301 U/L (ref 0–50)
AMORPH CRY #/AREA URNS HPF: ABNORMAL /HPF
ANION GAP SERPL CALCULATED.3IONS-SCNC: 3 MMOL/L (ref 3–14)
APPEARANCE UR: ABNORMAL
AST SERPL W P-5'-P-CCNC: 171 U/L (ref 0–45)
BACTERIA #/AREA URNS HPF: ABNORMAL /HPF
BASOPHILS # BLD AUTO: 0 10E9/L (ref 0–0.2)
BASOPHILS NFR BLD AUTO: 0.6 %
BILIRUB SERPL-MCNC: 2 MG/DL (ref 0.2–1.3)
BILIRUB UR QL STRIP: NEGATIVE
BUN SERPL-MCNC: 10 MG/DL (ref 7–30)
CALCIUM SERPL-MCNC: 9.4 MG/DL (ref 8.5–10.1)
CAOX CRY #/AREA URNS HPF: ABNORMAL /HPF
CHLORIDE SERPL-SCNC: 107 MMOL/L (ref 94–109)
CO2 SERPL-SCNC: 29 MMOL/L (ref 20–32)
COLOR UR AUTO: ABNORMAL
CREAT SERPL-MCNC: 0.65 MG/DL (ref 0.52–1.04)
DIFFERENTIAL METHOD BLD: ABNORMAL
EOSINOPHIL # BLD AUTO: 0.2 10E9/L (ref 0–0.7)
EOSINOPHIL NFR BLD AUTO: 4.4 %
ERYTHROCYTE [DISTWIDTH] IN BLOOD BY AUTOMATED COUNT: 15.5 % (ref 10–15)
GFR SERPL CREATININE-BSD FRML MDRD: ABNORMAL ML/MIN/1.7M2
GLUCOSE SERPL-MCNC: 101 MG/DL (ref 70–99)
GLUCOSE UR STRIP-MCNC: NEGATIVE MG/DL
HCT VFR BLD AUTO: 48.6 % (ref 35–47)
HGB BLD-MCNC: 15.8 G/DL (ref 11.7–15.7)
HGB UR QL STRIP: NEGATIVE
HYALINE CASTS #/AREA URNS LPF: 1 /LPF (ref 0–2)
IMM GRANULOCYTES # BLD: 0 10E9/L (ref 0–0.4)
IMM GRANULOCYTES NFR BLD: 0.2 %
KETONES UR STRIP-MCNC: NEGATIVE MG/DL
LEUKOCYTE ESTERASE UR QL STRIP: NEGATIVE
LIPASE SERPL-CCNC: 165 U/L (ref 73–393)
LYMPHOCYTES # BLD AUTO: 1.3 10E9/L (ref 0.8–5.3)
LYMPHOCYTES NFR BLD AUTO: 25.7 %
MCH RBC QN AUTO: 30.2 PG (ref 26.5–33)
MCHC RBC AUTO-ENTMCNC: 32.5 G/DL (ref 31.5–36.5)
MCV RBC AUTO: 93 FL (ref 78–100)
MONOCYTES # BLD AUTO: 0.4 10E9/L (ref 0–1.3)
MONOCYTES NFR BLD AUTO: 8.7 %
MUCOUS THREADS #/AREA URNS LPF: PRESENT /LPF
NEUTROPHILS # BLD AUTO: 3.1 10E9/L (ref 1.6–8.3)
NEUTROPHILS NFR BLD AUTO: 60.4 %
NITRATE UR QL: NEGATIVE
NRBC # BLD AUTO: 0 10*3/UL
NRBC BLD AUTO-RTO: 0 /100
PH UR STRIP: 6 PH (ref 5–7)
PLATELET # BLD AUTO: 241 10E9/L (ref 150–450)
POTASSIUM SERPL-SCNC: 5.1 MMOL/L (ref 3.4–5.3)
PROT SERPL-MCNC: 7.6 G/DL (ref 6.8–8.8)
RBC # BLD AUTO: 5.23 10E12/L (ref 3.8–5.2)
RBC #/AREA URNS AUTO: 1 /HPF (ref 0–2)
SODIUM SERPL-SCNC: 139 MMOL/L (ref 133–144)
SP GR UR STRIP: 1.02 (ref 1–1.03)
SQUAMOUS #/AREA URNS AUTO: <1 /HPF (ref 0–1)
URN SPEC COLLECT METH UR: ABNORMAL
UROBILINOGEN UR STRIP-MCNC: 0 MG/DL (ref 0–2)
WBC # BLD AUTO: 5.1 10E9/L (ref 4–11)
WBC #/AREA URNS AUTO: 2 /HPF (ref 0–2)

## 2017-04-11 ASSESSMENT — PAIN SCALES - GENERAL: PAINLEVEL: NO PAIN (0)

## 2017-04-11 NOTE — PATIENT INSTRUCTIONS
[facility] Medication Refill Request Information:  * Please contact your pharmacy regarding ANY request for medication refills.  ** [facility] Prescription Fax = [phone]  * Please allow 3 business days for routine medication refills.  * Please allow 5 business days for controlled substance medication refills.     [facility] Test Result notification information:  *You will be notified with in 7-10 days of your appointment day regarding the results of your test.  If you are on MyChart you will be notified as soon as the provider has reviewed the results and signed off on them.

## 2017-04-11 NOTE — MR AVS SNAPSHOT
After Visit Summary   4/11/2017    Shira Rodriguez    MRN: [id number]           Patient Information     Date Of Birth          1960        Visit Information        Provider Department      4/11/2017 8:00 AM Anjel Busby MD [facility]        Today's Diagnoses     Abdominal pain, generalized    -  1    Screen for colon cancer        Visit for screening mammogram          Care Instructions    Primary Care Center Medication Refill Request Information:  * Please contact your pharmacy regarding ANY request for medication refills.  ** [facility] Prescription Fax = [phone]  * Please allow 3 business days for routine medication refills.  * Please allow 5 business days for controlled substance medication refills.     Primary Care Center Test Result notification information:  *You will be notified with in 7-10 days of your appointment day regarding the results of your test.  If you are on MyChart you will be notified as soon as the provider has reviewed the results and signed off on them.          Follow-ups after your visit        Your next 10 appointments already scheduled     Apr 11, 2017  9:30 AM CDT   LAB with [facility] ([facility])    [address] [place] [id number] [phone]           Patient must bring picture ID.  Patient should be prepared to give a urine specimen  Please do not eat 10-12 hours before your appointment if you are coming in fasting for labs on lipids, cholesterol, or glucose (sugar).  Pregnant women should follow their Care Team instructions. Water with medications is okay. Do not drink coffee or other fluids.   If you have concerns about taking  your medications, please ask at office or if scheduling via [employer], send a message by clicking on Secure Messaging, Message Your Care Team.            Apr 12, 2017  4:00 PM CDT   (Arrive by 3:45 PM)   MA SCREENING DIGITAL BILATERAL with [id number]   [facility]  [facility] ([facility])    [address] [place] [id number] [phone]           Do not use any powder, lotion or deodorant under your arms or on your breast. If you do, we will ask you to remove it before your exam.  Wear comfortable, two-piece clothing.  If you have any allergies, tell your care team.  Bring any previous mammograms from other facilities or have them mailed to the breast center. This mammogram location, [facility], now offers 3D mammography. It doesn't replace a screening mammogram and can be done with a regular screening mammogram. It is optional and not all insurances will pay for it. 3D mammography is a special kind of mammogram that produces a three-dimensional image of the breast by using low dose-xrays. 3D allows the radiologist to see the breast tissue differently from 2D, which reduces the chance of repeat testing due to overlapping breast tissue. If you are interested in have a 3D mammogram, please check with your insurance before you arrive for your exam. 3D mammography is offered to all patients. On the day of your exam you will be asked to sign a form stating yes, you wish to have 3D imaging or, no, you decline.            Apr 12, 2017  4:20 PM CDT   (Arrive by 4:05 PM)   CT ABDOMEN PELVIS W/O & W CONTRAST with UCCT1   [facility] CT ([facility])    [address] [place] [id number] [phone]           Please bring any scans or X-rays taken at other hospitals, if similar tests were done. Also bring a list of your medicines, including vitamins, minerals and over-the-counter drugs. It is safest to leave personal items at home.  Be sure to tell your doctor:   If you have any allergies.   If there s any chance you are pregnant.   If you are breastfeeding.   If you have any special needs.  You may have contrast for this exam.  To prepare:   Do not eat or drink for 2 hours before your exam. If you need to take medicine, you may take it with small sips of water. (We may ask you to take liquid medicine as well.)   The day before your exam, drink extra fluids at least six 8-ounce glasses (unless your doctor tells you to restrict your fluids).  Patients over 70 or patients with diabetes or kidney problems:   If you haven t had a blood test (creatinine test) within the last 30 days, go to your clinic or Diagnostic Imaging Department for this test.  If you have diabetes:   If your kidney function is normal, continue taking your metformin (Avandamet, Glucophage, Glucovance, Metaglip) on the day of your exam.   If your kidney function is abnormal, wait 48 hours before restarting this medicine.  You will have oral contrast for this exam:   You will drink the contrast at home. Get this from your clinic or Diagnostic Imaging Department. Please follow the directions given.  Please wear loose clothing, such as a sweat suit or jogging clothes. Avoid snaps, zippers and other metal. We may ask you to undress and put on a hospital gown.  If you have any questions, please call the Imaging Department where you will have your exam.            Apr 27, 2017  4:30 PM CDT   (Arrive by 4:15 PM)   Return Visit with Georgina Richardson MD   [facility] ([facility])    [address] [place] [id number]   [phone]            May 15, 2017  8:05 AM CDT   (Arrive by 7:50 AM)   Return Visit with Anjel Busby MD   [facility] ([facility])    [address] [place] [id number]   [phone]            Oct 16, 2017  8:30 AM CDT   (Arrive by 8:15 AM)   Return Visit with Rosita Chandra MD   [facility] ([facility])    [address] [place] [id number]    [phone]              Future tests that were ordered for you today     Open Future Orders        Priority Expected Expires Ordered    MA SCREENING DIGITAL BILAT - Future  (s+30) Routine  4/11/2018 4/11/2017    CBC with platelets differential Routine 4/11/2017 4/25/2017 4/11/2017    Comprehensive metabolic panel Routine 4/11/2017 4/25/2017 4/11/2017    UA with Micro reflex to Culture Routine 4/11/2017 4/11/2018 4/11/2017    Lipase Routine 4/11/2017 4/25/2017 4/11/2017    CT Abdomen/Pelvis w/o & w contrast Routine  4/11/2018 4/11/2017            Who to contact     Please call your clinic at [phone] to:    Ask questions about your health    Make or cancel appointments    Discuss your medicines    Learn about your test results    Speak to your doctor   If you have compliments or concerns about an experience at your clinic, or if you wish to file a complaint, please contact [facility] Patient Relations at [phone] or email us at [email or web address]         Additional Information About Your Visit        [email or web address]     [employer] gives you secure access to your electronic health record. If you see a primary care provider, you can also send messages to your care team and make appointments. If you have questions, please call your primary care clinic.  If you do not have a primary care provider, please call [phone] and they will assist you.      [employer] is an electronic gateway that provides easy, online access to your medical records. With [employer], you can request a clinic appointment, read your test results, renew a prescription or communicate with your care team.     To access your existing account, please contact your [facility] or call [phone] for assistance.        Care EveryWhere ID     This is your Care EveryWhere ID. This could be used by other organizations to access your [facility] medical records  [id number]         Your Vitals Were     Pulse Respirations BMI (Body Mass Index)             75 16 61.33 kg/m2          Blood Pressure from Last 3 Encounters:   04/11/17 (!) 154/92   03/06/17 112/78   02/20/17 (!) 178/108    Weight from Last 3 Encounters:   04/11/17 (!) 172.4 kg (380 lb)   03/06/17 (!) 178.3 kg (393 lb)   02/20/17 (!) 178.3 kg (393 lb)                 Today's Medication Changes          These changes are accurate as of: 4/11/17  9:10 AM.  If you have any questions, ask your nurse or doctor.               Stop taking these medicines if you haven't already. Please contact your care team if you have questions.     furosemide 20 MG tablet   Commonly known as:  LASIX   Stopped by:  Anjel Busby MD           nicotine polacrilex 2 MG gum   Commonly known as:  NICORETTE   Stopped by:  Anjel Busby MD                    Primary Care Provider Office Phone # Fax #    Anjel Busby [phone]       PRIMARY [facility]ARE CENTER [address] [place] [phone]        Thank you!     Thank you for choosing [facility]  for your care. Our goal is always to provide you with excellent care. Hearing back from our patients is one way we can continue to improve our services. Please take a few minutes to complete the written survey that you may receive in the mail after your visit with us. Thank you!             Your Updated Medication List - Protect others around you: Learn how to safely use, store and throw away your medicines at www.disposemymeds.org.          This list is accurate as of: 4/11/17  9:10 AM.  Always use your most recent med list.                   Brand Name Dispense Instructions for use    albuterol 108 (90 BASE) MCG/ACT Inhaler    PROAIR HFA/PROVENTIL HFA/VENTOLIN HFA    1 Inhaler    Inhale 2 puffs into the lungs every 6 hours as needed for shortness of breath / dyspnea or wheezing       aspirin 81 MG tablet      Take 81 mg by mouth daily       atorvastatin 20  MG tablet    LIPITOR    90 tablet    Take 1 tablet (20 mg) by mouth daily       budesonide-formoterol 160-4.5 MCG/ACT Inhaler    SYMBICORT    1 Inhaler    Inhale 2 puffs into the lungs 2 times daily       fluocinonide 0.05 % ointment    LIDEX    60 g    Apply topically daily To hands and feet when dermatitis active       lisinopril 10 MG tablet    PRINIVIL/ZESTRIL    90 tablet    Take 1 tablet (10 mg) by mouth daily       meloxicam 15 MG tablet    MOBIC    30 tablet    Take 1 tablet (15 mg) by mouth daily       order for DME      Equipment being ordered: BIPAP 19/13 CM H20 AIRCURVE 10 AIRFIT F20 SN# [id number]   DN# [id number]       SERTRALINE HCL PO      Take 100 mg by mouth daily

## 2017-04-11 NOTE — PROGRESS NOTES
SUBJECTIVE:    Pt is a 56 year old female with pmh of     Patient Active Problem List   Diagnosis     Primary localized osteoarthrosis, pelvic region and thigh     Adjustment disorder with depressed mood     Tobacco use disorder     Morbid obesity (H)     Arthritis of knee, degenerative     Degeneration of cervical intervertebral disc     CYRUS (obstructive sleep apnea)     Elevated blood pressure (not hypertension)       who is here for evaluation of had concerns including Consult and Derm Problem.    Here for several concerns.  One, for about a month or so, three episodes abd pain, generalized, new. Appetite down in same time frame. No obvious things either trigger or alleviate the abd pain. No h/o GI problems or GI or  surgeries. Stools lighter in color in same time frame. When pain there, can still eat, does not affect the pain. Stools otherwise normal, might be slightly loose. No  complaints. Wt down over this time. Also over this time feeling generalized itching. These sx did not correlate w/ any med changes or diet changes. She does smoke, no etoh--although stress of late is a sister with relapse of etohism    Two, CTS sx much better, had one side injected, not even wearing splints now    Three, [facility] wanted her to do stress test and she will wait till mid May when current job done, hard to get time off in meantime, sx same still SANTOS no better or worse.    Four, edema much better than over the winter    Five, no treating CYRUS now, had trouble w/ CPAP     Six, chronic bronchtiis per pulm, their notes rvwd, has f/u there soon    Seven, smoker, discussed our pharmD quit pgm if she would be open to pursing that    Allergies   Allergen Reactions     Adhesive Tape Blisters     Erythromycin Rash               Current Outpatient Prescriptions   Medication Sig Dispense Refill     meloxicam (MOBIC) 15 MG tablet Take 1 tablet (15 mg) by mouth daily 30 tablet 1     order for DME Equipment being ordered: BIPAP  19/13  CM H20  AIRCURVE 10  AIRFIT F20  # [id number]   DN# [id number]       aspirin 81 MG tablet Take 81 mg by mouth daily       lisinopril (PRINIVIL/ZESTRIL) 10 MG tablet Take 1 tablet (10 mg) by mouth daily 90 tablet 3     atorvastatin (LIPITOR) 20 MG tablet Take 1 tablet (20 mg) by mouth daily 90 tablet 3     SERTRALINE HCL PO Take 100 mg by mouth daily        albuterol (PROAIR HFA/PROVENTIL HFA/VENTOLIN HFA) 108 (90 BASE) MCG/ACT Inhaler Inhale 2 puffs into the lungs every 6 hours as needed for shortness of breath / dyspnea or wheezing 1 Inhaler 0     fluocinonide (LIDEX) 0.05 % ointment Apply topically daily To hands and feet when dermatitis active 60 g 2     budesonide-formoterol (SYMBICORT) 160-4.5 MCG/ACT inhaler Inhale 2 puffs into the lungs 2 times daily 1 Inhaler 1       Social History   Substance Use Topics     Smoking status: Current Every Day Smoker     Packs/day: 0.75     Years: 33.00     Types: Cigarettes     Start date: 1/1/1982     Smokeless tobacco: Never Used     Alcohol use 0.0 oz/week     0 Standard drinks or equivalent per week      Comment: OCCASSIONALLY 2 DRINKS PER MONTH           OBJECTIVE:  BP (!) 154/92  Pulse 75  Resp 16  Wt (!) 172.4 kg (380 lb)  BMI 61.33 kg/m2  GENERAL APPEARANCE: Alert, no acute distress  EYES: PERRL, EOM normal, conjunctiva and lids normal  RESP: lungs clear to auscultation   CV: normal rate, regular rhythm, no murmur or gallop  ABDOMEN: soft, no organomegaly, masses or tenderness  MS: extremities normal, no peripheral edema  NEURO: Alert, oriented, speech and mentation normal  PSYCHE: mentation appears normal, affect and mood normal    ASSESSMENT/PLAN:    Abd pain, intermiittent, unexplained wt loss, loss of appetite and light colored stools:  Labs, abd/pelv ct as ordered; liver tests are up will pursue further after ct    SANTOS: I suggested she get the stress test done, she states she will in about a mo    CYRUS; she knows to treat the CYRUS we can offer referral back to  sleep clinic if needed    Smoker: she knows she can access our pharmD quit program    She plans to keep pulm f/u in two weeks then see me mid May    AMANDA JULIEN MD

## 2017-04-11 NOTE — NURSING NOTE
Chief Complaint   Patient presents with     Consult     patient states she has had a change in stool color and has had abdominal pain     Derm Problem     patient states that her skin is itching body wide     TOSIN ROSE at 8:23 AM on 4/11/2017.

## 2017-04-12 DIAGNOSIS — R79.89 ELEVATED LFTS: ICD-10-CM

## 2017-04-13 ENCOUNTER — TELEPHONE (OUTPATIENT)
Dept: INTERNAL MEDICINE | Facility: CLINIC | Age: 57
End: 2017-04-13

## 2017-04-13 DIAGNOSIS — R79.89 ELEVATED LFTS: Primary | ICD-10-CM

## 2017-04-13 LAB
HAV IGG SER QL IA: ABNORMAL
HAV IGM SERPL QL IA: NORMAL
HBV CORE AB SERPL QL IA: NONREACTIVE
HBV SURFACE AB SERPL IA-ACNC: 21.21 M[IU]/ML
HBV SURFACE AG SERPL QL IA: NONREACTIVE
HCV AB SERPL QL IA: NORMAL

## 2017-04-13 NOTE — TELEPHONE ENCOUNTER
Orders are placed.  Pt was informed the orders and she will call for scheduling.    Soon-Mi  -----------------------------------------          ----- Message from Anjel Busby MD sent at 4/13/2017  1:20 PM CDT -----  Plz call her on her call    1--she needs MRCP re: elevated liver function tests    2--when here for that, do labs--cbc/diff, cmet, INR re: same reason    3--I just spoke w/ hepatology who took her MR and are putting her in to see Dr Dangelo Mcconnell April 20 at 9:30 am, can you confirm that?    Thanks  Stoney

## 2017-04-20 DIAGNOSIS — R79.89 ELEVATED LFTS: ICD-10-CM

## 2017-04-20 LAB
ALBUMIN SERPL-MCNC: 3 G/DL (ref 3.4–5)
ALP SERPL-CCNC: 496 U/L (ref 40–150)
ALT SERPL W P-5'-P-CCNC: 441 U/L (ref 0–50)
ANION GAP SERPL CALCULATED.3IONS-SCNC: 8 MMOL/L (ref 3–14)
AST SERPL W P-5'-P-CCNC: 351 U/L (ref 0–45)
BILIRUB SERPL-MCNC: 2.8 MG/DL (ref 0.2–1.3)
BUN SERPL-MCNC: 24 MG/DL (ref 7–30)
CALCIUM SERPL-MCNC: 9.2 MG/DL (ref 8.5–10.1)
CHLORIDE SERPL-SCNC: 108 MMOL/L (ref 94–109)
CO2 SERPL-SCNC: 24 MMOL/L (ref 20–32)
CREAT SERPL-MCNC: 0.72 MG/DL (ref 0.52–1.04)
GFR SERPL CREATININE-BSD FRML MDRD: 84 ML/MIN/1.7M2
GLUCOSE SERPL-MCNC: 85 MG/DL (ref 70–99)
INR PPP: 1.01 (ref 0.86–1.14)
POTASSIUM SERPL-SCNC: 3.7 MMOL/L (ref 3.4–5.3)
PROT SERPL-MCNC: 7.3 G/DL (ref 6.8–8.8)
SODIUM SERPL-SCNC: 140 MMOL/L (ref 133–144)

## 2017-04-21 ENCOUNTER — TELEPHONE (OUTPATIENT)
Dept: GASTROENTEROLOGY | Facility: CLINIC | Age: 57
End: 2017-04-21

## 2017-04-21 ENCOUNTER — TELEPHONE (OUTPATIENT)
Dept: INTERNAL MEDICINE | Facility: CLINIC | Age: 57
End: 2017-04-21

## 2017-04-21 ENCOUNTER — OFFICE VISIT (OUTPATIENT)
Dept: GASTROENTEROLOGY | Facility: CLINIC | Age: 57
End: 2017-04-21
Attending: INTERNAL MEDICINE
Payer: COMMERCIAL

## 2017-04-21 VITALS
HEART RATE: 98 BPM | OXYGEN SATURATION: 98 % | SYSTOLIC BLOOD PRESSURE: 175 MMHG | DIASTOLIC BLOOD PRESSURE: 100 MMHG | TEMPERATURE: 98.6 F | WEIGHT: 293 LBS | HEIGHT: 66 IN | BODY MASS INDEX: 47.09 KG/M2

## 2017-04-21 DIAGNOSIS — R79.89 ABNORMAL LIVER FUNCTION TESTS: Primary | ICD-10-CM

## 2017-04-21 DIAGNOSIS — R79.89 ABNORMAL LIVER FUNCTION TESTS: ICD-10-CM

## 2017-04-21 LAB
IGA SERPL-MCNC: 271 MG/DL (ref 70–380)
IGG SERPL-MCNC: 940 MG/DL (ref 695–1620)
IGM SERPL-MCNC: 395 MG/DL (ref 60–265)

## 2017-04-21 PROCEDURE — 36415 COLL VENOUS BLD VENIPUNCTURE: CPT | Performed by: INTERNAL MEDICINE

## 2017-04-21 PROCEDURE — 86256 FLUORESCENT ANTIBODY TITER: CPT | Performed by: INTERNAL MEDICINE

## 2017-04-21 PROCEDURE — 83516 IMMUNOASSAY NONANTIBODY: CPT | Performed by: INTERNAL MEDICINE

## 2017-04-21 PROCEDURE — 82784 ASSAY IGA/IGD/IGG/IGM EACH: CPT | Performed by: INTERNAL MEDICINE

## 2017-04-21 PROCEDURE — 99212 OFFICE O/P EST SF 10 MIN: CPT | Mod: ZF

## 2017-04-21 ASSESSMENT — PAIN SCALES - GENERAL: PAINLEVEL: SEVERE PAIN (7)

## 2017-04-21 NOTE — NURSING NOTE
Elevated Liver Tests  Pt roomed, vitals, meds, and allergies reviewed with pt. Pt ready for provider.  Romulo Vidales, CMA

## 2017-04-21 NOTE — PROGRESS NOTES
I had the pleasure of seeing Shira Rodriguez for consultation in the Liver Clinic at the [facility] on 04/21/2017.  Ms. Rodriguez presents for consultation regarding abnormal liver tests.      She reports that beginning in December she began developing intermittent episodes of abdominal pain.  In December, she had a very severe episode of right upper quadrant to really across the entire upper abdomen pain that lasted for a night.  She did okay with intermittent and more mild pain until March when she noted the onset of pruritus, light colored stools and intermittent severe right upper quadrant pain.  She never did have any fevers with this.  She did see her primary physician, Dr. Busby, who did a very appropriate workup, which included a CT scan and blood work.  She had fairly striking liver enzymes and alkaline phosphatase elevation, as well as an elevated bilirubin that was substantially different from what it had been obtained back in September.  The CT scan that he obtained did show evidence of fairly marked common bile duct dilation, and she subsequently underwent an MRCP that showed improvement in the duct dilation and no obstructing lesions.  Her stools have become somewhat lighter, but she still has the intermittent abdominal pain and the itching.        As I mentioned, she denies any fevers or chills.  She does have some increased pain with cough.  She denies any shortness of breath.  She denies any nausea, vomiting.  Again, she did have an episode of diarrhea about 10 days ago as well.  Her appetite has been diminished, and she has probably lost a total of about 20 pounds since this episode began.      Past Medical History:  She has had previous orthopedic surgery.  Her gallbladder is intact. She has had a lifelong zuluaga with obesity and does have hypertension and hyperlipidemia with that.  She also carries a diagnosis of obstructive sleep apnea syndrome and was told at one  time she had fatty liver disease.      Social History:  She is a tobacco user.  She is single.  She works as a nurse at the hospital.  She does not drink any alcohol.  She has no traditional risk factors for hepatitis C.      Family History:  Significant for metastatic breast cancer in her grandmother.  She otherwise has no family history of liver disease.      A complete review of systems was negative other than that noted above.       Current Outpatient Prescriptions   Medication     order for DME     aspirin 81 MG tablet     atorvastatin (LIPITOR) 20 MG tablet     SERTRALINE HCL PO     albuterol (PROAIR HFA/PROVENTIL HFA/VENTOLIN HFA) 108 (90 BASE) MCG/ACT Inhaler     budesonide-formoterol (SYMBICORT) 160-4.5 MCG/ACT inhaler     lisinopril (PRINIVIL/ZESTRIL) 10 MG tablet     fluocinonide (LIDEX) 0.05 % ointment     No current facility-administered medications for this visit.      B/P: 175/100, T: 98.6, P: 98, R: Data Unavailable    HEENT exam shows no scleral icterus and no temple muscle wasting.  Neck is without thyromegaly.  Chest is clear.  Her cardiac exam reveals no S3, S4 or murmur.  Her abdominal exam shows her to be obese.  No masses or tenderness to palpation are present.  Liver is 10 cm in span and not tender at the right costal margin.  No spleen tip is palpable, and extremity exam shows no edema.  Skin exam shows no stigmata of chronic liver disease.  Neurologic exam shows no asterixis.       Recent Results (from the past 168 hour(s))   Comprehensive metabolic panel    Collection Time: 04/20/17  6:41 PM   Result Value Ref Range    Sodium 140 133 - 144 mmol/L    Potassium 3.7 3.4 - 5.3 mmol/L    Chloride 108 94 - 109 mmol/L    Carbon Dioxide 24 20 - 32 mmol/L    Anion Gap 8 3 - 14 mmol/L    Glucose 85 70 - 99 mg/dL    Urea Nitrogen 24 7 - 30 mg/dL    Creatinine 0.72 0.52 - 1.04 mg/dL    GFR Estimate 84 >60 mL/min/1.7m2    GFR Estimate If Black >90   GFR Calc   >60 mL/min/1.7m2     Calcium 9.2 8.5 - 10.1 mg/dL    Bilirubin Total 2.8 (H) 0.2 - 1.3 mg/dL    Albumin 3.0 (L) 3.4 - 5.0 g/dL    Protein Total 7.3 6.8 - 8.8 g/dL    Alkaline Phosphatase 496 (H) 40 - 150 U/L     (H) 0 - 50 U/L     (H) 0 - 45 U/L   INR    Collection Time: 04/20/17  6:41 PM   Result Value Ref Range    INR 1.01 0.86 - 1.14      My impression is that Ms. Rodriguez has markedly abnormal liver tests.  They certainly would be consistent with having passed a stone; however, I am disappointed that they are not better 9 days later after the original CT scan.  I will speak to our advanced endoscopists about performing an ERCP, which I do think it is indicated at this point in time.  If that were to show no evidence of duct obstruction, I would probably pursue a liver biopsy.  She has had a partial workup for autoimmune hepatitis which would be the most likely diagnosis if this was just purely liver related in that she had a negative EMERY.  I have rechecked her immunoglobulins and for completeness I will check an antimitochondrial antibody and also check an F-actin antibody.  I will communicate with her this afternoon after I have had a chance to speak with the advanced endoscopist.      Thank you very much for allowing me to participate in the care of this patient.  If you have any questions regarding my recommendations, please do not hesitate to contact me.       Dangelo Mcconnell MD      Professor of Medicine  [employer]      Executive Medical Director, Solid Organ Transplant Program  [facility]

## 2017-04-21 NOTE — TELEPHONE ENCOUNTER
Writer spoke with Dr. Mcconnell who was already aware of abnormal findings on MRI/MRCP and has been in contact with Dr. Bennett to arrange an appointment.

## 2017-04-21 NOTE — TELEPHONE ENCOUNTER
Received a phone call from the radiology regarding abnormal MRCP result -- Possible sequela of cholangitis, follow-up  Informed Dr. Mcconnell's office since pt saw Dr. Mcconnell this morning and Dr. Busby is not in the clinic till next week.

## 2017-04-21 NOTE — LETTER
4/21/2017       RE: Shira Rodriguez  [address] [facility] [address]     Dear Colleague,    Thank you for referring your patient, Shira Rodriguez, to the [facility]. Please see a copy of my visit note below.    I had the pleasure of seeing Shira Rodriguez for consultation in the Liver Clinic at the [facility] on 04/21/2017.  Ms. Rodriguez presents for consultation regarding abnormal liver tests.      She reports that beginning in December she began developing intermittent episodes of abdominal pain.  In December, she had a very severe episode of right upper quadrant to really across the entire upper abdomen pain that lasted for a night.  She did okay with intermittent and more mild pain until March when she noted the onset of pruritus, light colored stools and intermittent severe right upper quadrant pain.  She never did have any fevers with this.  She did see her primary physician, Dr. Bsuby, who did a very appropriate workup, which included a CT scan and blood work.  She had fairly striking liver enzymes and alkaline phosphatase elevation, as well as an elevated bilirubin that was substantially different from what it had been obtained back in September.  The CT scan that he obtained did show evidence of fairly marked common bile duct dilation, and she subsequently underwent an MRCP that showed improvement in the duct dilation and no obstructing lesions.  Her stools have become somewhat lighter, but she still has the intermittent abdominal pain and the itching.        As I mentioned, she denies any fevers or chills.  She does have some increased pain with cough.  She denies any shortness of breath.  She denies any nausea, vomiting.  Again, she did have an episode of diarrhea about 10 days ago as well.  Her appetite has been diminished, and she has probably lost a total of about 20 pounds since this episode began.       Past Medical History:  She has had previous orthopedic surgery.  Her gallbladder is intact. She has had a lifelong zuluaga with obesity and does have hypertension and hyperlipidemia with that.  She also carries a diagnosis of obstructive sleep apnea syndrome and was told at one time she had fatty liver disease.      Social History:  She is a tobacco user.  She is single.  She works as a nurse at the hospital.  She does not drink any alcohol.  She has no traditional risk factors for hepatitis C.      Family History:  Significant for metastatic breast cancer in her grandmother.  She otherwise has no family history of liver disease.      A complete review of systems was negative other than that noted above.       Current Outpatient Prescriptions   Medication     order for DME     aspirin 81 MG tablet     atorvastatin (LIPITOR) 20 MG tablet     SERTRALINE HCL PO     albuterol (PROAIR HFA/PROVENTIL HFA/VENTOLIN HFA) 108 (90 BASE) MCG/ACT Inhaler     budesonide-formoterol (SYMBICORT) 160-4.5 MCG/ACT inhaler     lisinopril (PRINIVIL/ZESTRIL) 10 MG tablet     fluocinonide (LIDEX) 0.05 % ointment     No current facility-administered medications for this visit.      B/P: 175/100, T: 98.6, P: 98, R: Data Unavailable    HEENT exam shows no scleral icterus and no temple muscle wasting.  Neck is without thyromegaly.  Chest is clear.  Her cardiac exam reveals no S3, S4 or murmur.  Her abdominal exam shows her to be obese.  No masses or tenderness to palpation are present.  Liver is 10 cm in span and not tender at the right costal margin.  No spleen tip is palpable, and extremity exam shows no edema.  Skin exam shows no stigmata of chronic liver disease.  Neurologic exam shows no asterixis.       Recent Results (from the past 168 hour(s))   Comprehensive metabolic panel    Collection Time: 04/20/17  6:41 PM   Result Value Ref Range    Sodium 140 133 - 144 mmol/L    Potassium 3.7 3.4 - 5.3 mmol/L    Chloride 108 94 - 109 mmol/L     Carbon Dioxide 24 20 - 32 mmol/L    Anion Gap 8 3 - 14 mmol/L    Glucose 85 70 - 99 mg/dL    Urea Nitrogen 24 7 - 30 mg/dL    Creatinine 0.72 0.52 - 1.04 mg/dL    GFR Estimate 84 >60 mL/min/1.7m2    GFR Estimate If Black >90   GFR Calc   >60 mL/min/1.7m2    Calcium 9.2 8.5 - 10.1 mg/dL    Bilirubin Total 2.8 (H) 0.2 - 1.3 mg/dL    Albumin 3.0 (L) 3.4 - 5.0 g/dL    Protein Total 7.3 6.8 - 8.8 g/dL    Alkaline Phosphatase 496 (H) 40 - 150 U/L     (H) 0 - 50 U/L     (H) 0 - 45 U/L   INR    Collection Time: 04/20/17  6:41 PM   Result Value Ref Range    INR 1.01 0.86 - 1.14      My impression is that Ms. Rodriguez has markedly abnormal liver tests.  They certainly would be consistent with having passed a stone; however, I am disappointed that they are not better 9 days later after the original CT scan.  I will speak to our advanced endoscopists about performing an ERCP, which I do think it is indicated at this point in time.  If that were to show no evidence of duct obstruction, I would probably pursue a liver biopsy.  She has had a partial workup for autoimmune hepatitis which would be the most likely diagnosis if this was just purely liver related in that she had a negative EMERY.  I have rechecked her immunoglobulins and for completeness I will check an antimitochondrial antibody and also check an F-actin antibody.  I will communicate with her this afternoon after I have had a chance to speak with the advanced endoscopist.      Thank you very much for allowing me to participate in the care of this patient.  If you have any questions regarding my recommendations, please do not hesitate to contact me.       Dangelo Mcconnell MD      Professor of Medicine  [employer]      Executive Medical Director, Solid Organ Transplant Program  [facility]

## 2017-04-21 NOTE — MR AVS SNAPSHOT
After Visit Summary   4/21/2017    Shira Rodriguez    MRN: [id number]           Patient Information     Date Of Birth          1960        Visit Information        Provider Department      4/21/2017 9:30 AM Dangelo Mcconnell MD [facility] Hepatology        Today's Diagnoses     Abnormal liver function tests    -  1       Follow-ups after your visit        Follow-up notes from your care team     Return if symptoms worsen or fail to improve.      Your next 10 appointments already scheduled     May 15, 2017  8:05 AM CDT   (Arrive by 7:50 AM)   Return Visit with Anjel Busby MD   [facility] ([facility])    [address] [place] [address]  4th [facility] [id number] [phone]            Oct 16, 2017  8:30 AM CDT   (Arrive by 8:15 AM)   Return Visit with Rosita Chandra MD   [facility] Rheumatology ([facility])    [address] [place] [address]  3rd [facility] [id number] [phone]              Who to contact     If you have questions or need follow up information about today's clinic visit or your schedule please contact [facility] HEPATOLOGY directly at [phone].  Normal or non-critical lab and imaging results will be communicated to you by MyChart, letter or phone within 4 business days after the clinic has received the results. If you do not hear from us within 7 days, please contact the clinic through MyChart or phone. If you have a critical or abnormal lab result, we will notify you by phone as soon as possible.  Submit refill requests through "[employer] or call your pharmacy and they will forward the refill request to us. Please allow 3 business days for your refill to be completed.          Additional Information About Your Visit        MyChart Information     "[employer] gives you secure access to your electronic health record. If you see a primary care provider, you can also send messages to your care team and make  "appointments. If you have questions, please call your primary care clinic.  If you do not have a primary care provider, please call [phone] and they will assist you.        Care EveryWhere ID     This is your Care EveryWhere ID. This could be used by other organizations to access your [facility] medical records  [id number]        Your Vitals Were     Pulse Temperature Height Pulse Oximetry BMI (Body Mass Index)       98 98.6  F (37  C) (Oral) 1.676 m (5' 6\") 98% 60.17 kg/m2        Blood Pressure from Last 3 Encounters:   04/21/17 (!) 175/100   04/11/17 (!) 154/92   03/06/17 112/78    Weight from Last 3 Encounters:   04/21/17 (!) 169.1 kg (372 lb 12.8 oz)   04/11/17 (!) 172.4 kg (380 lb)   03/06/17 (!) 178.3 kg (393 lb)              We Performed the Following     Mitochondrial M2 Antibody IgG        Primary Care Provider Office Phone # Fax #    Anjel Busby [phone]       [facility] [address] [place], [place] [id number]        Thank you!     Thank you for choosing [facility] HEPATOLOGY  for your care. Our goal is always to provide you with excellent care. Hearing back from our patients is one way we can continue to improve our services. Please take a few minutes to complete the written survey that you may receive in the mail after your visit with us. Thank you!             Your Updated Medication List - Protect others around you: Learn how to safely use, store and throw away your medicines at www.disposemymeds.org.          This list is accurate as of: 4/21/17 11:59 PM.  Always use your most recent med list.                   Brand Name Dispense Instructions for use    albuterol 108 (90 BASE) MCG/ACT Inhaler    PROAIR HFA/PROVENTIL HFA/VENTOLIN HFA    1 Inhaler    Inhale 2 puffs into the lungs every 6 hours as needed for shortness of breath / dyspnea or wheezing       aspirin 81 MG tablet      Take 81 mg by mouth daily       atorvastatin 20 MG tablet    LIPITOR    90 " tablet    Take 1 tablet (20 mg) by mouth daily       budesonide-formoterol 160-4.5 MCG/ACT Inhaler    SYMBICORT    1 Inhaler    Inhale 2 puffs into the lungs 2 times daily       fluocinonide 0.05 % ointment    LIDEX    60 g    Apply topically daily To hands and feet when dermatitis active       lisinopril 10 MG tablet    PRINIVIL/ZESTRIL    90 tablet    Take 1 tablet (10 mg) by mouth daily       order for DME      Equipment being ordered: BIPAP 19/13 CM H20 AIRCURVE 10 AIRFIT F20 # [id number]   DN# [id number]       SERTRALINE HCL PO      Take 100 mg by mouth daily

## 2017-04-21 NOTE — LETTER
4/21/2017      RE: Shira Rodriguez  [address] [facility] [address]       I had the pleasure of seeing Shira Rodriguez for consultation in the Liver Clinic at the [facility] on 04/21/2017.  Ms. Rodriguez presents for consultation regarding abnormal liver tests.      She reports that beginning in December she began developing intermittent episodes of abdominal pain.  In December, she had a very severe episode of right upper quadrant to really across the entire upper abdomen pain that lasted for a night.  She did okay with intermittent and more mild pain until March when she noted the onset of pruritus, light colored stools and intermittent severe right upper quadrant pain.  She never did have any fevers with this.  She did see her primary physician, Dr. Busby, who did a very appropriate workup, which included a CT scan and blood work.  She had fairly striking liver enzymes and alkaline phosphatase elevation, as well as an elevated bilirubin that was substantially different from what it had been obtained back in September.  The CT scan that he obtained did show evidence of fairly marked common bile duct dilation, and she subsequently underwent an MRCP that showed improvement in the duct dilation and no obstructing lesions.  Her stools have become somewhat lighter, but she still has the intermittent abdominal pain and the itching.        As I mentioned, she denies any fevers or chills.  She does have some increased pain with cough.  She denies any shortness of breath.  She denies any nausea, vomiting.  Again, she did have an episode of diarrhea about 10 days ago as well.  Her appetite has been diminished, and she has probably lost a total of about 20 pounds since this episode began.      Past Medical History:  She has had previous orthopedic surgery.  Her gallbladder is intact. She has had a lifelong zuluaga with obesity and does have hypertension and hyperlipidemia with that.  She  also carries a diagnosis of obstructive sleep apnea syndrome and was told at one time she had fatty liver disease.      Social History:  She is a tobacco user.  She is single.  She works as a nurse at the hospital.  She does not drink any alcohol.  She has no traditional risk factors for hepatitis C.      Family History:  Significant for metastatic breast cancer in her grandmother.  She otherwise has no family history of liver disease.      A complete review of systems was negative other than that noted above.       Current Outpatient Prescriptions   Medication     order for DME     aspirin 81 MG tablet     atorvastatin (LIPITOR) 20 MG tablet     SERTRALINE HCL PO     albuterol (PROAIR HFA/PROVENTIL HFA/VENTOLIN HFA) 108 (90 BASE) MCG/ACT Inhaler     budesonide-formoterol (SYMBICORT) 160-4.5 MCG/ACT inhaler     lisinopril (PRINIVIL/ZESTRIL) 10 MG tablet     fluocinonide (LIDEX) 0.05 % ointment     No current facility-administered medications for this visit.      B/P: 175/100, T: 98.6, P: 98, R: Data Unavailable    HEENT exam shows no scleral icterus and no temple muscle wasting.  Neck is without thyromegaly.  Chest is clear.  Her cardiac exam reveals no S3, S4 or murmur.  Her abdominal exam shows her to be obese.  No masses or tenderness to palpation are present.  Liver is 10 cm in span and not tender at the right costal margin.  No spleen tip is palpable, and extremity exam shows no edema.  Skin exam shows no stigmata of chronic liver disease.  Neurologic exam shows no asterixis.       Recent Results (from the past 168 hour(s))   Comprehensive metabolic panel    Collection Time: 04/20/17  6:41 PM   Result Value Ref Range    Sodium 140 133 - 144 mmol/L    Potassium 3.7 3.4 - 5.3 mmol/L    Chloride 108 94 - 109 mmol/L    Carbon Dioxide 24 20 - 32 mmol/L    Anion Gap 8 3 - 14 mmol/L    Glucose 85 70 - 99 mg/dL    Urea Nitrogen 24 7 - 30 mg/dL    Creatinine 0.72 0.52 - 1.04 mg/dL    GFR Estimate 84 >60 mL/min/1.7m2     GFR Estimate If Black >90   GFR Calc   >60 mL/min/1.7m2    Calcium 9.2 8.5 - 10.1 mg/dL    Bilirubin Total 2.8 (H) 0.2 - 1.3 mg/dL    Albumin 3.0 (L) 3.4 - 5.0 g/dL    Protein Total 7.3 6.8 - 8.8 g/dL    Alkaline Phosphatase 496 (H) 40 - 150 U/L     (H) 0 - 50 U/L     (H) 0 - 45 U/L   INR    Collection Time: 04/20/17  6:41 PM   Result Value Ref Range    INR 1.01 0.86 - 1.14      My impression is that Ms. Rodriguez has markedly abnormal liver tests.  They certainly would be consistent with having passed a stone; however, I am disappointed that they are not better 9 days later after the original CT scan.  I will speak to our advanced endoscopists about performing an ERCP, which I do think it is indicated at this point in time.  If that were to show no evidence of duct obstruction, I would probably pursue a liver biopsy.  She has had a partial workup for autoimmune hepatitis which would be the most likely diagnosis if this was just purely liver related in that she had a negative EMERY.  I have rechecked her immunoglobulins and for completeness I will check an antimitochondrial antibody and also check an F-actin antibody.  I will communicate with her this afternoon after I have had a chance to speak with the advanced endoscopist.      Thank you very much for allowing me to participate in the care of this patient.  If you have any questions regarding my recommendations, please do not hesitate to contact me.       Dangelo Mcconnell MD      Professor of Medicine  [employer]      Executive Medical Director, Solid Organ Transplant Program  [facility]

## 2017-04-23 LAB — SMA IGG SER-ACNC: 20

## 2017-04-24 LAB — SMOOTH MUSCLE ABY IGG TITER: NORMAL

## 2017-04-25 ENCOUNTER — TELEPHONE (OUTPATIENT)
Dept: GASTROENTEROLOGY | Facility: CLINIC | Age: 57
End: 2017-04-25

## 2017-04-25 LAB — MITOCHONDRIA M2 IGG SER-ACNC: 1 U/ML

## 2017-04-25 NOTE — TELEPHONE ENCOUNTER
Bernadine is informed that she is scheduled on 5/3/2017 at 11:55am with an arrival time of 9:55am for an ERCP with Dr. Bennett.    Patient is aware that she will need an updated pre-op from her PCP, a  to pick patient up after the procedure and someone to monitor the patient 24 hours post-op.  All instructions along with pre-op form mailed to confirmed address in EPIC.    JPADMINI 4/25/2017  953 a

## 2017-04-27 ENCOUNTER — ANESTHESIA EVENT (OUTPATIENT)
Dept: SURGERY | Facility: CLINIC | Age: 57
End: 2017-04-27

## 2017-04-27 ENCOUNTER — OFFICE VISIT (OUTPATIENT)
Dept: SURGERY | Facility: CLINIC | Age: 57
End: 2017-04-27

## 2017-04-27 ENCOUNTER — ALLIED HEALTH/NURSE VISIT (OUTPATIENT)
Dept: SURGERY | Facility: CLINIC | Age: 57
End: 2017-04-27

## 2017-04-27 VITALS
RESPIRATION RATE: 15 BRPM | HEART RATE: 73 BPM | DIASTOLIC BLOOD PRESSURE: 75 MMHG | OXYGEN SATURATION: 94 % | WEIGHT: 293 LBS | BODY MASS INDEX: 47.09 KG/M2 | TEMPERATURE: 97.8 F | HEIGHT: 66 IN | SYSTOLIC BLOOD PRESSURE: 131 MMHG

## 2017-04-27 DIAGNOSIS — R10.9 ABDOMINAL PAIN: ICD-10-CM

## 2017-04-27 DIAGNOSIS — Z01.818 PREOP EXAMINATION: Primary | ICD-10-CM

## 2017-04-27 LAB
ALBUMIN SERPL-MCNC: 3.1 G/DL (ref 3.4–5)
ALP SERPL-CCNC: 251 U/L (ref 40–150)
ALT SERPL W P-5'-P-CCNC: 97 U/L (ref 0–50)
ANION GAP SERPL CALCULATED.3IONS-SCNC: 8 MMOL/L (ref 3–14)
AST SERPL W P-5'-P-CCNC: 48 U/L (ref 0–45)
BASOPHILS # BLD AUTO: 0.1 10E9/L (ref 0–0.2)
BASOPHILS NFR BLD AUTO: 0.8 %
BILIRUB SERPL-MCNC: 0.8 MG/DL (ref 0.2–1.3)
BUN SERPL-MCNC: 15 MG/DL (ref 7–30)
CALCIUM SERPL-MCNC: 9.5 MG/DL (ref 8.5–10.1)
CHLORIDE SERPL-SCNC: 107 MMOL/L (ref 94–109)
CO2 SERPL-SCNC: 25 MMOL/L (ref 20–32)
CREAT SERPL-MCNC: 0.48 MG/DL (ref 0.52–1.04)
DIFFERENTIAL METHOD BLD: ABNORMAL
EOSINOPHIL # BLD AUTO: 0.3 10E9/L (ref 0–0.7)
EOSINOPHIL NFR BLD AUTO: 4.1 %
ERYTHROCYTE [DISTWIDTH] IN BLOOD BY AUTOMATED COUNT: 14 % (ref 10–15)
GFR SERPL CREATININE-BSD FRML MDRD: ABNORMAL ML/MIN/1.7M2
GLUCOSE SERPL-MCNC: 77 MG/DL (ref 70–99)
HCT VFR BLD AUTO: 47.1 % (ref 35–47)
HGB BLD-MCNC: 15.5 G/DL (ref 11.7–15.7)
IMM GRANULOCYTES # BLD: 0 10E9/L (ref 0–0.4)
IMM GRANULOCYTES NFR BLD: 0.3 %
LYMPHOCYTES # BLD AUTO: 1.9 10E9/L (ref 0.8–5.3)
LYMPHOCYTES NFR BLD AUTO: 29.8 %
MCH RBC QN AUTO: 30.3 PG (ref 26.5–33)
MCHC RBC AUTO-ENTMCNC: 32.9 G/DL (ref 31.5–36.5)
MCV RBC AUTO: 92 FL (ref 78–100)
MONOCYTES # BLD AUTO: 0.5 10E9/L (ref 0–1.3)
MONOCYTES NFR BLD AUTO: 8.3 %
NEUTROPHILS # BLD AUTO: 3.6 10E9/L (ref 1.6–8.3)
NEUTROPHILS NFR BLD AUTO: 56.7 %
NRBC # BLD AUTO: 0 10*3/UL
NRBC BLD AUTO-RTO: 0 /100
PLATELET # BLD AUTO: 320 10E9/L (ref 150–450)
POTASSIUM SERPL-SCNC: 4.2 MMOL/L (ref 3.4–5.3)
PROT SERPL-MCNC: 7.6 G/DL (ref 6.8–8.8)
RBC # BLD AUTO: 5.11 10E12/L (ref 3.8–5.2)
SODIUM SERPL-SCNC: 140 MMOL/L (ref 133–144)
WBC # BLD AUTO: 6.4 10E9/L (ref 4–11)

## 2017-04-27 ASSESSMENT — PAIN SCALES - GENERAL: PAINLEVEL: NO PAIN (0)

## 2017-04-27 ASSESSMENT — LIFESTYLE VARIABLES: TOBACCO_USE: 1

## 2017-04-27 NOTE — ANESTHESIA PREPROCEDURE EVALUATION
Anesthesia Evaluation     . Pt has had prior anesthetic. Type: General and MAC    No history of anesthetic complications          ROS/MED HX    ENT/Pulmonary:     (+)sleep apnea, tobacco use, Current use 1 packs/day  doesn't use CPAP , . Other pulmonary disease chronic bronchitis.    Neurologic:  - neg neurologic ROS     Cardiovascular: Comment: Per cardiology notes, they suspect she has diastolic heart failure, but it isn't specifically diagnosed.     (+) hypertension-range: unknown, ---. : . . SANTOS, . :. . Previous cardiac testing Echodate:12/30/2016results:Interpretation Summary  Technically difficult study.  Global and regional left ventricular function is normal with an EF of 55-60%.  Moderate concentric left ventricular hypertrophy.  Right ventricular function, chamber size, wall motion, and thickness are  normal.  The IVC is normal. The estimated right atrial pressure is < 5 mmHg.  No pericardial effusion is present.  Previous study not available for comparison.date: results:ECG reviewed date:1/16/2017 results:Sinus rhythm, possible inferior infarct, no change from previous date: results:          METS/Exercise Tolerance:  3 - Able to walk 1-2 blocks without stopping   Hematologic:  - neg hematologic  ROS       Musculoskeletal:   (+) arthritis, , , -       GI/Hepatic:     (+) Other GI/Hepatic bile duct obstruction      Renal/Genitourinary:  - ROS Renal section negative       Endo:     (+) Obesity, .      Psychiatric:     (+) psychiatric history depression      Infectious Disease:  - neg infectious disease ROS       Malignancy:      - no malignancy   Other: Comment: eczema   (+) No chance of pregnancy no H/O Chronic Pain,                   Physical Exam  Normal systems: cardiovascular, pulmonary and dental    Airway   Mallampati: II  TM distance: >3 FB  Neck ROM: full    Dental     Cardiovascular   Rhythm and rate: regular and normal      Pulmonary    breath sounds clear to auscultation    Other findings:  Morbidly obese, obese neck           PAC Discussion and Assessment    ASA Classification: 2  Case is suitable for: [place]  Anesthetic techniques and relevant risks discussed: GA  Invasive monitoring and risk discussed: No  Types:   Possibility and Risk of blood transfusion discussed: No  NPO instructions given:   Additional anesthetic preparation and risks discussed:   Needs early admission to pre-op area:   Other:     PAC Resident/NP Anesthesia Assessment:  Shira Rodriguez is a 57 year old female scheduled for an ERCP on 5/3/2017 by Dr. Bennett in treatment of a bile duct obstruction.  PAC referral for risk assessment and optimization for anesthesia with comorbid conditions of :  Hypertension, hyperlipidemia, chronic exertional dyspnea, morbid obesity, chronic bronchitis, sleep apnea, tobacco use disorder, osteoarthritis, depression, and eczema.    Pre-operative considerations:  1.  Cardiac:  Functional status- METS 3.  The patient doesn't purposefully exercise and does have chronic exertional dyspnea, but reports she can walk at least a block without needing to stop to rest.  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  This scheduling plan has been okayed with her primary care provider per his note.  She has no angina or other concerning cardiac symptoms.  An echo was completed on 12/30/2016 and showed an EF of 55-60%.  Hypertension is managed with lisinopril; instructed to hold the DOS.  Low risk surgery with 0.4% risk of major adverse cardiac event.   No further cardiac evaluation needed per 2014 ACC/AHA guidelines for non-cardiac surgery.  2.  Pulm:  Airway feasible.  She does have sleep apnea, but hasn't been using her bi-pap for the past 1.5 months.  She is morbidly obese with a BMI >60.   She is a current  1ppd smoker.  She manages chronic bronchitis with albuterol and symbicort.     3.  GI:  Risk of PONV score = 1.  If > 2, anti-emetic intervention recommended.    VTE risk:  0.5%    Patient is optimized and is acceptable candidate for the proposed procedure.  No further diagnostic evaluation is needed.     Patient also evaluated by Dr. Escobedo. See recommendations below.     For further details of assessment, testing, and physical exam please see H and P completed on same date.          Dior Foster DNP, RN, APRN      Reviewed and Signed by PAC Mid-Level Provider/Resident  Mid-Level Provider/Resident: Dior Foster DNP, RN, APRN  Date: 4/27/2017  Time: 1605    Attending Anesthesiologist Anesthesia Assessment:  I saw the patient and discussed with the DESTINY as above.  Final anesthetic plan and recommendations to be made by the attending anesthesiologist on the day of surgery.       Reviewed and Signed by PAC Anesthesiologist  Anesthesiologist: SANDI  Date: 4/27/17  Time: 1609  Pass/Fail: Pass  Disposition:     PAC Pharmacist Assessment:        Pharmacist:   Date:   Time:                           .

## 2017-04-27 NOTE — H&P
Pre-Operative H & P     CC:  Preoperative exam to assess for increased cardiopulmonary risk while undergoing surgery and anesthesia.    Date of Encounter: 4/27/2017  Primary Care Physician:  Anjel Busby  Shira Rodriguez is a 57 year old female who presents for pre-operative H & P in preparation for an ERCP with Dr. Bennett on 5/3/17 at [facility].     Shira Rodriguez is a 57 year old female with hypertension, hyperlipidemia, chronic exertional dyspnea, morbid obesity, chronic bronchitis, sleep apnea, tobacco use disorder, osteoarthritis, depression, and eczema that had previously been having abdominal pain, discolored stools, and itching.  Evaluation revealed significantly elevated liver enzymes, elevated bilirubin, and common bile duct dilatation, but when she had a MRCP for further evaluation the duct dilation had improved and there were no notable obstructing lesions.  The itching and abdominal pain has since resolved.   She was referred to Dr. Mcconnell for consultation and an ERCP was recommended.      History is obtained from the patient.     Past Medical History  Past Medical History:   Diagnosis Date     Chronic bronchitis (H) 07/30/2015     Contact dermatitis      Eczema     HANDS     Elevated liver enzymes      H/O total knee replacement, left      History of total hip replacement 10/27/2011     Hyperlipidemia      Hypertension      Morbid obesity (H)      Osteoarthrosis     right knee     Sleep apnea      Tobacco use disorder        Past Surgical History  Past Surgical History:   Procedure Laterality Date     ARTHROPLASTY KNEE  6/14/2012    Procedure: ARTHROPLASTY KNEE;  Left Total Knee Arthroplasty;  Surgeon: Annette Quesada MD;  Location: [facility] OR     ARTHROSCOPY HIP Right      C TOTAL HIP ARTHROPLASTY  07/09/04    RT       Hx of Blood transfusions/reactions: none    Hx of abnormal bleeding or anti-platelet use: on aspirin - last dose  4/26/17    Menstrual history: No LMP recorded. Patient is postmenopausal.    Steroid use in the last year: medrol dosepak in December 2016 and Jan 2017    Personal or FH with difficulty with Anesthesia:  none      Prior to Admission Medications  Current Outpatient Prescriptions   Medication Sig Dispense Refill     order for DME Equipment being ordered: BIPAP  19/13 CM H20  AIRCURVE 10  AIRFIT F20  # [id number]   DN# [id number]       aspirin 81 MG tablet Take 81 mg by mouth every morning        lisinopril (PRINIVIL/ZESTRIL) 10 MG tablet Take 1 tablet (10 mg) by mouth daily 90 tablet 3     atorvastatin (LIPITOR) 20 MG tablet Take 1 tablet (20 mg) by mouth daily (Patient taking differently: Take 20 mg by mouth every evening ) 90 tablet 3     SERTRALINE HCL PO Take 100 mg by mouth every morning        albuterol (PROAIR HFA/PROVENTIL HFA/VENTOLIN HFA) 108 (90 BASE) MCG/ACT Inhaler Inhale 2 puffs into the lungs every 6 hours as needed for shortness of breath / dyspnea or wheezing 1 Inhaler 0     fluocinonide (LIDEX) 0.05 % ointment Apply topically daily To hands and feet when dermatitis active 60 g 2     budesonide-formoterol (SYMBICORT) 160-4.5 MCG/ACT inhaler Inhale 2 puffs into the lungs 2 times daily 1 Inhaler 1       Allergies  Allergies   Allergen Reactions     Adhesive Tape Blisters     Erythromycin Rash       Social History  Social History     Social History     Marital status: Single     Spouse name: N/A     Number of children: N/A     Years of education: N/A     Occupational History     registered nurse      [employer]     Social History Main Topics     Smoking status: Current Every Day Smoker     Packs/day: 1.00     Years: 33.00     Types: Cigarettes     Start date: 1/1/1982     Smokeless tobacco: Never Used     Alcohol use 0.0 oz/week     0 Standard drinks or equivalent per week      Comment: OCCASSIONALLY 2 DRINKS PER MONTH     Drug use: No     Sexual activity: Not on file     Other Topics Concern     Not on  "file     Social History Narrative       Family History  Family History   Problem Relation Age of Onset     Thyroid Disease Mother      Hypertension Mother      Skin Cancer Mother      MMohs surgery with skin graft     CEREBROVASCULAR DISEASE Mother      CVA after endarderectomy     Breast Cancer Paternal Grandmother      Pancreatic Cancer Paternal Grandmother      Cardiovascular Paternal Aunt      Cardiovascular Paternal Uncle      Depression Other      Alcoholism Father      Thyroid Disease Sister      Alcoholism Sister      Hypertension Maternal Grandmother      HEART DISEASE Sister      multiple ablations     Alcoholism Sister      Prostate Cancer Paternal Grandfather                  ROS/MED HX  The complete review of systems is negative other than noted in the HPI or here.     ENT/Pulmonary:     (+)sleep apnea, tobacco use, Current use 1 packs/day  doesn't use CPAP , . Other pulmonary disease chronic bronchitis.    Neurologic:  - neg neurologic ROS     Cardiovascular: Comment: Per cardiology notes, they suspect she has diastolic heart failure, but it isn't specifically diagnosed.     (+) hypertension-range: unknown, ---. : . . SANTOS, . :. .       METS/Exercise Tolerance:  3 - Able to walk 1-2 blocks without stopping   Hematologic:  - neg hematologic  ROS       Musculoskeletal:   (+) arthritis, , , -       GI/Hepatic:     (+) Other GI/Hepatic bile duct obstruction      Renal/Genitourinary:  - ROS Renal section negative       Endo:     (+) Obesity, .      Psychiatric:     (+) psychiatric history depression      Infectious Disease:  - neg infectious disease ROS       Malignancy:      - no malignancy   Other: Comment: eczema   (+) No chance of pregnancy no H/O Chronic Pain,           Temp: 97.8  F (36.6  C) Temp src: Oral BP: 131/75 Pulse: 73   Resp: 15 SpO2: 94 % (RA)         380 lbs 14.4 oz  5' 6\"   Body mass index is 61.48 kg/(m^2).       Physical Exam  Constitutional: Awake, alert, cooperative, no apparent " distress, and appears stated age. Morbidly obese  Eyes: Pupils equal, round and reactive to light, extra ocular muscles intact, sclera clear, conjunctiva normal.  HENT: Normocephalic, oral pharynx with moist mucus membranes, good dentition. No goiter appreciated.   Respiratory: Clear to auscultation bilaterally, no crackles or wheezing.  Cardiovascular: Regular rate and rhythm, normal S1 and S2, and no murmur noted.  Carotids +2, no bruits. No edema. Palpable pulses to radial  DP and PT arteries.   GI: Normal bowel sounds, soft, non-distended, non-tender, no masses palpated, no hepatosplenomegaly.  Lymph/Hematologic: No cervical lymphadenopathy and no supraclavicular lymphadenopathy.  Genitourinary:  deferred  Skin: Warm and dry.    Musculoskeletal: Full ROM of neck. There is no redness, warmth, or swelling of the exposed joints. Gross motor strength is normal.    Neurologic: Awake, alert, oriented to name, place and time. Cranial nerves II-XII are grossly intact. Gait is normal.   Neuropsychiatric: Calm, cooperative. Normal affect.     Labs: (personally reviewed)   Component      Latest Ref Rng & Units 4/27/2017   WBC      4.0 - 11.0 10e9/L 6.4   RBC Count      3.8 - 5.2 10e12/L 5.11   Hemoglobin      11.7 - 15.7 g/dL 15.5   Hematocrit      35.0 - 47.0 % 47.1 (H)   MCV      78 - 100 fl 92   MCH      26.5 - 33.0 pg 30.3   MCHC      31.5 - 36.5 g/dL 32.9   RDW      10.0 - 15.0 % 14.0   Platelet Count      150 - 450 10e9/L 320   Diff Method       Automated Method   % Neutrophils      % 56.7   % Lymphocytes      % 29.8   % Monocytes      % 8.3   % Eosinophils      % 4.1   % Basophils      % 0.8   % Immature Granulocytes      % 0.3   Nucleated RBCs      0 /100 0   Absolute Neutrophil      1.6 - 8.3 10e9/L 3.6   Absolute Lymphocytes      0.8 - 5.3 10e9/L 1.9   Absolute Monocytes      0.0 - 1.3 10e9/L 0.5   Absolute Eosinophils      0.0 - 0.7 10e9/L 0.3   Absolute Basophils      0.0 - 0.2 10e9/L 0.1   Abs Immature  Granulocytes      0 - 0.4 10e9/L 0.0   Absolute Nucleated RBC       0.0   Sodium      133 - 144 mmol/L 140   Potassium      3.4 - 5.3 mmol/L 4.2   Chloride      94 - 109 mmol/L 107   Carbon Dioxide      20 - 32 mmol/L 25   Anion Gap      3 - 14 mmol/L 8   Glucose      70 - 99 mg/dL 77   Urea Nitrogen      7 - 30 mg/dL 15   Creatinine      0.52 - 1.04 mg/dL 0.48 (L)   GFR Estimate      >60 mL/min/1.7m2 >90 . . .   GFR Estimate If Black      >60 mL/min/1.7m2 >90 . . .   Calcium      8.5 - 10.1 mg/dL 9.5   Bilirubin Total      0.2 - 1.3 mg/dL 0.8   Albumin      3.4 - 5.0 g/dL 3.1 (L)   Protein Total      6.8 - 8.8 g/dL 7.6   Alkaline Phosphatase      40 - 150 U/L 251 (H)   ALT      0 - 50 U/L 97 (H)   AST      0 - 45 U/L 48 (H)     EK2017 SR, possible inferior infarct - no change from previous  Cardiac echo:   2016  Interpretation Summary  Technically difficult study.  Global and regional left ventricular function is normal with an EF of 55-60%.  Moderate concentric left ventricular hypertrophy.  Right ventricular function, chamber size, wall motion, and thickness are  normal.  The IVC is normal. The estimated right atrial pressure is < 5 mmHg.  No pericardial effusion is present.  Previous study not available for comparison.    ASSESSMENT and PLAN  Shira Rodriguez is a 57 year old female scheduled for an ERCP on 5/3/2017 by Dr. Bennett in treatment of a bile duct obstruction.  PAC referral for risk assessment and optimization for anesthesia with comorbid conditions of :  Hypertension, hyperlipidemia, chronic exertional dyspnea, morbid obesity, chronic bronchitis, sleep apnea, tobacco use disorder, osteoarthritis, depression, and eczema.    Pre-operative considerations:  1.  Cardiac:  Functional status- METS 3.  The patient doesn't purposefully exercise and does have chronic exertional dyspnea, but reports she can walk at least a block without needing to stop to rest.  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  This scheduling plan has been okayed with her primary care provider per his note.  She has no angina or other concerning cardiac symptoms.  An echo was completed on 12/30/2016 and showed an EF of 55-60%.  Hypertension is managed with lisinopril; instructed to hold the DOS.  Low risk surgery with 0.4% risk of major adverse cardiac event.   No further cardiac evaluation needed per 2014 ACC/AHA guidelines for non-cardiac surgery.  2.  Pulm:  Airway feasible.  She does have sleep apnea, but hasn't been using her bi-pap for the past 1.5 months.  She is morbidly obese with a BMI >60.   She is a current 1ppd smoker.  She manages chronic bronchitis with albuterol and symbicort.     3.  GI:  Risk of PONV score = 1.  If > 2, anti-emetic intervention recommended.    VTE risk:  0.5%    Patient is optimized and is acceptable candidate for the proposed procedure.  No further diagnostic evaluation is needed.     Patient also evaluated by Dr. Ecsobedo.           Dior Foster, TOÑITO, RN, APRN  Preoperative Assessment Center  [facility]  Clinic and Surgery Center  Phone: [phone]  Fax: [phone]

## 2017-04-27 NOTE — MR AVS SNAPSHOT
After Visit Summary   2017    Shira Rodriguez    MRN: [id number]           Patient Information     Date Of Birth          1960        Visit Information        Provider Department      2017 4:30 PM Rn, [facility] Preoperative Assessment Center        Care Instructions    Preparing for Your Surgery      Name:  Shira Rodriguez   MRN:  [id number]   :  1960   Today's Date:  2017     Arriving for surgery:  Surgery date:  5/3/17  Surgery time:  1155   Arrival time:  0955  Please come to:       [facility] [address]  [address] [place]  [address]    -   parking is available in front of the hospital from 5:15 am to 8:00 pm    -  Stop at the Information Desk in the lobby    -   Inform the information person that you are here for surgery. An escort to 3c will be provided. If you would not like an escort, please proceed to 3C on the 3rd floor. [phone]     What can I eat or drink?  -  You may have solid food or milk products until 8 hours prior to your surgery. 0355  -  You may have water, apple juice or 7up/Sprite until 2 hours prior to your surgery. 0955    Which medicines can I take?  -  Do NOT take these medications in the morning, the day of surgery:  Stop aspirin 1 week prior to procedure, do not take lisinopril the morning of surgery    -  Please take these medications the day of surgery:  Take sertraline and symbicort the morning of surgery.  Use albuterol as needed and bring the day of surgery.     How do I prepare myself?  -  Take two showers: one the night before surgery; and one the morning of surgery.         Use Scrubcare or Hibiclens to wash from neck down.  You may use your own shampoo and conditioner. No other hair products.   -  Do NOT use lotion, powder, deodorant, or antiperspirant the day of your surgery.  -  Do NOT wear any makeup, fingernail polish or jewelry.  -Do not bring your own medications to the  [facility], except for inhalers and eye drops.  -  Bring your ID and insurance card.    Questions or Concerns:  If you have questions or concerns, please call the  Preoperative Assessment Center, Monday-Friday 7AM-7PM:  [phone]                  Follow-ups after your visit        Your next 10 appointments already scheduled     Apr 27, 2017  4:10 PM CDT   (Arrive by 3:55 PM)   PAC Anesthesia Consult with  Pac Anesthesiologist   [facility] Preoperative Assessment Center ([facility])    [address] [place] [id number]   [phone]            Apr 27, 2017  4:30 PM CDT   (Arrive by 4:15 PM)   PAC RN ASSESSMENT with  Pac Rn   [facility] Preoperative Assessment Bureau ([facility])    [address] [place] [id number]   [phone]            Apr 27, 2017  5:00 PM CDT   LAB with  LAB   [facility] ([facility])    [address] [place] [id number]   [phone]           Patient must bring picture ID.  Patient should be prepared to give a urine specimen  Please do not eat 10-12 hours before your appointment if you are coming in fasting for labs on lipids, cholesterol, or glucose (sugar).  Pregnant women should follow their Care Team instructions. Water with medications is okay. Do not drink coffee or other fluids.   If you have concerns about taking  your medications, please ask at office or if scheduling via [employer], send a message by clicking on Secure Messaging, Message Your Care Team.            May 03, 2017   Procedure with Jose Bennett MD   [facility] [place], Same Day Surgery (--)    [address] [facility] [address]   [phone]            May 15, 2017  8:05 AM CDT   (Arrive by 7:50 AM)   Return Visit with Anjel Busby MD   [facility] ([facility])    [address] [place] [address]  [id number]   [phone]            Oct 16, 2017  8:30 AM CDT   (Arrive by 8:15 AM)   Return Visit with Rosita Chandra MD   [facility] ([facility])    [address] [id number] [phone]              Who to contact     Please call your clinic at [phone] to:    Ask questions about your health    Make or cancel appointments    Discuss your medicines    Learn about your test results    Speak to your doctor   If you have compliments or concerns about an experience at your clinic, or if you wish to file a complaint, please contact [facility] Patient Relations at [phone] or email us at [email or web address]         Additional Information About Your Visit        [employer] Information     [employer] gives you secure access to your electronic health record. If you see a primary care provider, you can also send messages to your care team and make appointments. If you have questions, please call your primary care clinic.  If you do not have a primary care provider, please call [phone] and they will assist you.      Am[employer] is an electronic gateway that provides easy, online access to your medical records. With [employer], you can request a clinic appointment, read your test results, renew a prescription or communicate with your care team.     To access your existing account, please contact your [facility] or call [phone] for assistance.        Care EveryWhere ID     This is your Care EveryWhere ID. This could be used by other organizations to access your [place] medical records  [id number]         Blood Pressure from Last 3 Encounters:   04/27/17 (!) 164/100   04/21/17 (!) 175/100   04/11/17 (!) 154/92    Weight from Last 3 Encounters:   04/27/17 (!) 172.8 kg (380 lb 14.4 oz)   04/21/17 (!) 169.1 kg (372 lb 12.8 oz)   04/11/17 (!) 172.4 kg (380 lb)               Today, you had the following     No orders found for display         Today's Medication Changes          These changes are accurate as of: 4/27/17  4:04 PM.  If you have any questions, ask your nurse or doctor.               These medicines have changed or have updated prescriptions.        Dose/Directions    atorvastatin 20 MG tablet   Commonly known as:  LIPITOR   This may have changed:  when to take this        Dose:  20 mg   Take 1 tablet (20 mg) by mouth daily   Quantity:  90 tablet   Refills:  3                Primary Care Provider Office Phone # Fax #    Anjel Busby [phone]       PRIMARY CARE CENTER [address] [place] [address] 88  [facility] [address]        Thank you!     Thank you for choosing [facility] PREOPERATIVE ASSESSMENT [place]  for your care. Our goal is always to provide you with excellent care. Hearing back from our patients is one way we can continue to improve our services. Please take a few minutes to complete the written survey that you may receive in the mail after your visit with us. Thank you!             Your Updated Medication List - Protect others around you: Learn how to safely use, store and throw away your medicines at www.disposemymeds.org.          This list is accurate as of: 4/27/17  4:04 PM.  Always use your most recent med list.                   Brand Name Dispense Instructions for use    albuterol 108 (90 BASE) MCG/ACT Inhaler    PROAIR HFA/PROVENTIL HFA/VENTOLIN HFA    1 Inhaler    Inhale 2 puffs into the lungs every 6 hours as needed for shortness of breath / dyspnea or wheezing       aspirin 81 MG tablet      Take 81 mg by mouth every morning       atorvastatin 20 MG tablet    LIPITOR    90 tablet    Take 1 tablet (20 mg) by mouth daily       budesonide-formoterol 160-4.5 MCG/ACT Inhaler    SYMBICORT    1 Inhaler    Inhale 2 puffs into the lungs 2 times daily       fluocinonide 0.05 % ointment    LIDEX    60 g    Apply topically daily To hands and feet  when dermatitis active       lisinopril 10 MG tablet    PRINIVIL/ZESTRIL    90 tablet    Take 1 tablet (10 mg) by mouth daily       order for DME      Equipment being ordered: BIPAP 19/13 CM H20 AIRCURVE 10 AIRFIT F20 SN# [id number]   DN# [id number]       SERTRALINE HCL PO      Take 100 mg by mouth every morning

## 2017-04-27 NOTE — PATIENT INSTRUCTIONS
Preparing for Your Surgery      Name:  Shira Rodriguez   MRN:  [id number]   :  1960   Today's Date:  2017     Arriving for surgery:  Surgery date:  5/3/17  Surgery time:  1155   Arrival time:  0955  Please come to:       [facility] [address]  [address] [place]  [address]    -   parking is available in front of the hospital from 5:15 am to 8:00 pm    -  Stop at the Information Desk in the lobby    -   Inform the information person that you are here for surgery. An escort to 3c will be provided. If you would not like an escort, please proceed to 3C on the 3rd floor. [phone]     What can I eat or drink?  -  You may have solid food or milk products until 8 hours prior to your surgery. 0355  -  You may have water, apple juice or 7up/Sprite until 2 hours prior to your surgery. 0955    Which medicines can I take?  -  Do NOT take these medications in the morning, the day of surgery:  Stop aspirin 1 week prior to procedure, do not take lisinopril the morning of surgery    -  Please take these medications the day of surgery:  Take sertraline and symbicort the morning of surgery.  Use albuterol as needed and bring the day of surgery.     How do I prepare myself?  -  Take two showers: one the night before surgery; and one the morning of surgery.         Use Scrubcare or Hibiclens to wash from neck down.  You may use your own shampoo and conditioner. No other hair products.   -  Do NOT use lotion, powder, deodorant, or antiperspirant the day of your surgery.  -  Do NOT wear any makeup, fingernail polish or jewelry.  -Do not bring your own medications to the hospital, except for inhalers and eye drops.  -  Bring your ID and insurance card.    Questions or Concerns:  If you have questions or concerns, please call the  Preoperative Assessment Center, Monday-Friday 7AM-7PM:  [phone]

## 2017-05-02 ENCOUNTER — CARE COORDINATION (OUTPATIENT)
Dept: GASTROENTEROLOGY | Facility: CLINIC | Age: 57
End: 2017-05-02

## 2017-05-02 NOTE — PROGRESS NOTES
"Shira called call center and left message stating she wanted to cancel her ERCP for now until she can talk to Dr. Mcconnell (who is currently out of town). Called and spoke to her, she prefers to wait since her labs are better and she is feeling better.   States she has been talking to \"Dr DE OLIVEIRA\" and she is making the executive decision to cancel for now.     Karo DE OLIVEIRA RN Coordinator  Dr. Manning, Dr. Bennett & Dr. Banuelos  Pancreas~Biliary  [phone] #[phone]          "

## 2017-05-02 NOTE — PROGRESS NOTES
Message received that patient would like to cancel her ERCP that is scheduled for tomorrow with Dr. Bennett. Patient's reason for canceling is due to her wanting to wait until she is able to talk to Dr. Mcconnell again before she moves forward and she is also feeling better.    Called OR scheduling and cancelled patient's ERCP for tomorrow.  Spoke to patient and confirmed that her ERCP has been cancelled.   Verbalized understanding.    Neda Urbina LPN  Advanced Endoscopy~ Panc/Bili  Dr. Manning, Dr. Joevl & Dr. Bennett  [phone]

## 2017-05-11 DIAGNOSIS — R79.89 ELEVATED LFTS: ICD-10-CM

## 2017-05-11 LAB
ALBUMIN SERPL-MCNC: 3.1 G/DL (ref 3.4–5)
ALP SERPL-CCNC: 123 U/L (ref 40–150)
ALT SERPL W P-5'-P-CCNC: 46 U/L (ref 0–50)
ANION GAP SERPL CALCULATED.3IONS-SCNC: 10 MMOL/L (ref 3–14)
AST SERPL W P-5'-P-CCNC: 28 U/L (ref 0–45)
BILIRUB SERPL-MCNC: 0.6 MG/DL (ref 0.2–1.3)
BUN SERPL-MCNC: 10 MG/DL (ref 7–30)
CALCIUM SERPL-MCNC: 9.3 MG/DL (ref 8.5–10.1)
CHLORIDE SERPL-SCNC: 105 MMOL/L (ref 94–109)
CO2 SERPL-SCNC: 26 MMOL/L (ref 20–32)
CREAT SERPL-MCNC: 0.53 MG/DL (ref 0.52–1.04)
GFR SERPL CREATININE-BSD FRML MDRD: ABNORMAL ML/MIN/1.7M2
GLUCOSE SERPL-MCNC: 114 MG/DL (ref 70–99)
POTASSIUM SERPL-SCNC: 4.6 MMOL/L (ref 3.4–5.3)
PROT SERPL-MCNC: 7.4 G/DL (ref 6.8–8.8)
SODIUM SERPL-SCNC: 140 MMOL/L (ref 133–144)

## 2017-05-15 ENCOUNTER — OFFICE VISIT (OUTPATIENT)
Dept: FAMILY MEDICINE | Facility: CLINIC | Age: 57
End: 2017-05-15

## 2017-05-15 VITALS
RESPIRATION RATE: 20 BRPM | SYSTOLIC BLOOD PRESSURE: 126 MMHG | OXYGEN SATURATION: 98 % | HEART RATE: 75 BPM | BODY MASS INDEX: 61.43 KG/M2 | DIASTOLIC BLOOD PRESSURE: 79 MMHG | WEIGHT: 293 LBS

## 2017-05-15 DIAGNOSIS — Z12.11 SCREEN FOR COLON CANCER: Primary | ICD-10-CM

## 2017-05-15 DIAGNOSIS — R06.09 DOE (DYSPNEA ON EXERTION): ICD-10-CM

## 2017-05-15 ASSESSMENT — PAIN SCALES - GENERAL: PAINLEVEL: NO PAIN (0)

## 2017-05-15 NOTE — PROGRESS NOTES
SUBJECTIVE:    Pt is a 57 year old female with pmh of     Patient Active Problem List   Diagnosis     Primary localized osteoarthrosis, pelvic region and thigh     Adjustment disorder with depressed mood     Tobacco use disorder     Morbid obesity (H)     Arthritis of knee, degenerative     Degeneration of cervical intervertebral disc     CYRUS (obstructive sleep apnea)     Elevated blood pressure (not hypertension)       who is here for evaluation of had concerns including Fatigue.    Here for a f/u.  One, no more abd pain, and itching and abnl stool color are gone too. Rvwd imaging and labs. She canceled EUS. We did review today that I defer to GI/hepatology opinion on that, but as she feels better and labs are better, I will at least set up redo abnl labs later this mo.   Two, fatigue, severe. Falls asleep daytimes. Uses CYRUS device but finds it off sometimes.  Three, SANTOS, vague, she is not sure if SANTOS vs just the fatigue. Edema better post prednisone. Echo rvwd.    Allergies   Allergen Reactions     Adhesive Tape Blisters     Erythromycin Rash           Current Outpatient Prescriptions   Medication Sig Dispense Refill     order for DME Equipment being ordered: BIPAP  19/13 CM H20  AIRCURVE 10  AIRFIT F20  SN# [id number]   DN# [id number]       aspirin 81 MG tablet Take 81 mg by mouth every morning        lisinopril (PRINIVIL/ZESTRIL) 10 MG tablet Take 1 tablet (10 mg) by mouth daily 90 tablet 3     atorvastatin (LIPITOR) 20 MG tablet Take 1 tablet (20 mg) by mouth daily (Patient taking differently: Take 20 mg by mouth every evening ) 90 tablet 3     SERTRALINE HCL PO Take 150 mg by mouth every morning        albuterol (PROAIR HFA/PROVENTIL HFA/VENTOLIN HFA) 108 (90 BASE) MCG/ACT Inhaler Inhale 2 puffs into the lungs every 6 hours as needed for shortness of breath / dyspnea or wheezing 1 Inhaler 0     fluocinonide (LIDEX) 0.05 % ointment Apply topically daily To hands and feet when dermatitis active 60 g 2      budesonide-formoterol (SYMBICORT) 160-4.5 MCG/ACT inhaler Inhale 2 puffs into the lungs 2 times daily 1 Inhaler 1       Social History   Substance Use Topics     Smoking status: Current Every Day Smoker     Packs/day: 1.00     Years: 33.00     Types: Cigarettes     Start date: 1/1/1982     Smokeless tobacco: Never Used     Alcohol use 0.0 oz/week     0 Standard drinks or equivalent per week      Comment: OCCASSIONALLY 2 DRINKS PER MONTH           OBJECTIVE:  /79  Pulse 75  Resp 20  Wt (!) 172.6 kg (380 lb 9.6 oz)  SpO2 98%  BMI 61.43 kg/m2  GENERAL APPEARANCE: Alert, no acute distress  RESP: lungs clear to auscultation   CV: normal rate, regular rhythm, no murmur or gallop  ABDOMEN: soft, no organomegaly, masses or tenderness  MS: extremities normal, no peripheral edema  NEURO: Alert, oriented, speech and mentation normal  PSYCHE: mentation appears normal, affect and mood normal    ASSESSMENT/PLAN:    Fatigue: likely CYRUS  Mild SANTOS: stress test  Abnl liver tests: likely passed stone but will redo labs in near future, I told her if those worse and/or sx recur then must do EUS  1/2 hr visit over half couns/coord care majority discussing fatigue eval/treat including rvw of what we've done, further options. Unlikely pulm htn but with obese/CYRUS status early case possible. Echo not diagnostic for.    AMANDA JULIEN MD

## 2017-05-15 NOTE — PATIENT INSTRUCTIONS
[facility] Medication Refill Request Information:  * Please contact your pharmacy regarding ANY request for medication refills.  ** [facility] Prescription Fax = [phone]  * Please allow 3 business days for routine medication refills.  * Please allow 5 business days for controlled substance medication refills.     [facility] Test Result notification information:  *You will be notified with in 7-10 days of your appointment day regarding the results of your test.  If you are on MyChart you will be notified as soon as the provider has reviewed the results and signed off on them.    [facility] [phone]

## 2017-05-15 NOTE — MR AVS SNAPSHOT
After Visit Summary   5/15/2017    Shira Rodriguez    MRN: [id number]           Patient Information     Date Of Birth          1960        Visit Information        Provider Department      5/15/2017 8:05 AM Anjel Busby MD [facility]        Today's Diagnoses     Screen for colon cancer    -  1    SANTOS (dyspnea on exertion)          Care Instructions    [facility] Center Medication Refill Request Information:  * Please contact your pharmacy regarding ANY request for medication refills.  ** [facility] Prescription Fax = [phone]  * Please allow 3 business days for routine medication refills.  * Please allow 5 business days for controlled substance medication refills.     [facility] Test Result notification information:  *You will be notified with in 7-10 days of your appointment day regarding the results of your test.  If you are on MyChart you will be notified as soon as the provider has reviewed the results and signed off on them.    [facility] [phone]         Follow-ups after your visit        Your next 10 appointments already scheduled     May 22, 2017  9:00 AM CDT   NM INJECTION with UUNMINJ1   [facility], Nuclear Medicine ([facility])    [address] [place] [address] [phone]            May 22, 2017  9:45 AM CDT   NM SCAN3 with UUNM1   [facility], Nuclear Medicine ([facility])    [address] [place] [address] [phone]            May 22, 2017 10:15 AM CDT   Ekg Stress Nm Adenosine with UUEKGNMS   [facility] ([facility])    [address] [place] [address]               May 22, 2017 11:15 AM CDT   NM MPI ADENOSINE with UUNM1   [facility], Nuclear Medicine ([facility])    [address] [place] [address]  [facility] [address] [phone]           For a ONE day exam: Allow 3-4 hours for test. For a TWO day exam: Allow 2 hours PER day for test.  You may need to stop some medicines before the test. Follow your doctor s orders. - If you take a beta blocker: Follow your doctor s specific instructions on taking it prior to and on the day of your exam. - If you take Aggrenox or dipyridamole (Persantine, Permole), stop taking it 48 hours before your test. - If you take Viagra, Cialis or Levitra, stop taking it 48 hours before your test. - If you take theophylline or aminophylline, stop taking it 12 hours before your test.  For patients with diabetes: - If you take insulin, call your diabetes care team. Ask if you should take a 1/2 dose the morning of your test. - If you take diabetes medicine by mouth, don t take it on the morning of your test. Bring it with you to take after the test. (If you have questions, call your diabetes care team.)  Do not take nitrates on the day of your test. Do not wear your Nitro-Patch.  Stop all caffeine 12 hours before the test. This includes coffee, tea, soda pop, chocolate and certain medicines (such as Anacin, Excedrin and NoDoz). Also avoid decaf coffee and tea, as these contain small amounts of caffeine.  No alcohol, smoking or other tobacco for 12 hours before the test.  Stop eating 3 hours before the test. You may drink water.  Please wear a loose two-piece outfit. If you will have an exercise test, bring rubber-soled walking shoes.  When you arrive, please tell us if you: - Have diabetes - Are breastfeeding - May be pregnant - Have a pacemaker of ICD (implantable defibrillator).  Please call your Imaging Department at your exam site with any questions.            Oct 16, 2017  8:30 AM CDT   (Arrive by 8:15 AM)   Return Visit with Rosita Chandra MD   [facility] ([facility])    [address] [facility]  3rd [facility]  [address]   [phone]              Future tests that were ordered for you today     Open Future Orders        Priority Expected Expires Ordered    NM Adenosine Stress Test Routine  5/15/2018 5/15/2017    F Actin EAI with reflex Routine 5/15/2017 5/15/2018 5/15/2017    Comprehensive metabolic panel Routine 5/15/2017 5/29/2017 5/15/2017            Who to contact     Please call your clinic at [phone] to:    Ask questions about your health    Make or cancel appointments    Discuss your medicines    Learn about your test results    Speak to your doctor   If you have compliments or concerns about an experience at your clinic, or if you wish to file a complaint, please contact [facility] Patient Relations at [phone] or email us at [email or web address]         Additional Information About Your Visit        IntexysharIVFXPERT Information     [employer] gives you secure access to your electronic health record. If you see a primary care provider, you can also send messages to your care team and make appointments. If you have questions, please call your primary care clinic.  If you do not have a primary care provider, please call [phone] and they will assist you.      [employer] is an electronic gateway that provides easy, online access to your medical records. With [employer], you can request a clinic appointment, read your test results, renew a prescription or communicate with your care team.     To access your existing account, please contact your [facility] or call [phone] for assistance.        Care EveryWhere ID     This is your Care EveryWhere ID. This could be used by other organizations to access your [place] medical records  [id number]        Your Vitals Were     Pulse Respirations Pulse Oximetry BMI (Body Mass Index)          75 20 98% 61.43 kg/m2         Blood Pressure from Last 3 Encounters:   05/15/17 126/79   04/27/17 131/75   04/21/17  (!) 175/100    Weight from Last 3 Encounters:   05/15/17 (!) 172.6 kg (380 lb 9.6 oz)   04/27/17 (!) 172.8 kg (380 lb 14.4 oz)   04/21/17 (!) 169.1 kg (372 lb 12.8 oz)                 Today's Medication Changes          These changes are accurate as of: 5/15/17  9:14 AM.  If you have any questions, ask your nurse or doctor.               These medicines have changed or have updated prescriptions.        Dose/Directions    atorvastatin 20 MG tablet   Commonly known as:  LIPITOR   This may have changed:  when to take this        Dose:  20 mg   Take 1 tablet (20 mg) by mouth daily   Quantity:  90 tablet   Refills:  3                Primary Care Provider Office Phone # Fax #    Anjel Busby [phone]       [facility] [address] [place] [address]        Thank you!     Thank you for choosing [facility]  for your care. Our goal is always to provide you with excellent care. Hearing back from our patients is one way we can continue to improve our services. Please take a few minutes to complete the written survey that you may receive in the mail after your visit with us. Thank you!             Your Updated Medication List - Protect others around you: Learn how to safely use, store and throw away your medicines at www.disposemymeds.org.          This list is accurate as of: 5/15/17  9:14 AM.  Always use your most recent med list.                   Brand Name Dispense Instructions for use    albuterol 108 (90 BASE) MCG/ACT Inhaler    PROAIR HFA/PROVENTIL HFA/VENTOLIN HFA    1 Inhaler    Inhale 2 puffs into the lungs every 6 hours as needed for shortness of breath / dyspnea or wheezing       aspirin 81 MG tablet      Take 81 mg by mouth every morning       atorvastatin 20 MG tablet    LIPITOR    90 tablet    Take 1 tablet (20 mg) by mouth daily       budesonide-formoterol 160-4.5 MCG/ACT Inhaler    SYMBICORT    1 Inhaler    Inhale 2 puffs into the lungs 2 times  daily       fluocinonide 0.05 % ointment    LIDEX    60 g    Apply topically daily To hands and feet when dermatitis active       lisinopril 10 MG tablet    PRINIVIL/ZESTRIL    90 tablet    Take 1 tablet (10 mg) by mouth daily       order for DME      Equipment being ordered: BIPAP 19/13 CM H20 AIRCURVE 10 AIRFIT F20 SN# [id number]   DN# [id number]       SERTRALINE HCL PO      Take 150 mg by mouth every morning

## 2017-05-15 NOTE — NURSING NOTE
Chief Complaint   Patient presents with     Fatigue     continues to feel fatigue   Nilda Foster LPN 8:00 AM on 5/15/2017

## 2017-05-22 ENCOUNTER — HOSPITAL ENCOUNTER (OUTPATIENT)
Dept: NUCLEAR MEDICINE | Facility: CLINIC | Age: 57
Setting detail: NUCLEAR MEDICINE
End: 2017-05-22
Attending: FAMILY MEDICINE
Payer: COMMERCIAL

## 2017-05-22 ENCOUNTER — HOSPITAL ENCOUNTER (OUTPATIENT)
Dept: NUCLEAR MEDICINE | Facility: CLINIC | Age: 57
Setting detail: NUCLEAR MEDICINE
Discharge: HOME | End: 2017-05-22
Attending: FAMILY MEDICINE | Admitting: FAMILY MEDICINE
Payer: COMMERCIAL

## 2017-05-22 VITALS — DIASTOLIC BLOOD PRESSURE: 61 MMHG | SYSTOLIC BLOOD PRESSURE: 151 MMHG

## 2017-05-22 DIAGNOSIS — R06.09 DOE (DYSPNEA ON EXERTION): ICD-10-CM

## 2017-05-22 PROCEDURE — 25000128 H RX IP 250 OP 636: Performed by: INTERNAL MEDICINE

## 2017-05-22 PROCEDURE — A9502 TC99M TETROFOSMIN: HCPCS | Performed by: FAMILY MEDICINE

## 2017-05-22 PROCEDURE — 78452 HT MUSCLE IMAGE SPECT MULT: CPT | Mod: 26 | Performed by: INTERNAL MEDICINE

## 2017-05-22 PROCEDURE — 93016 CV STRESS TEST SUPVJ ONLY: CPT | Mod: GC | Performed by: INTERNAL MEDICINE

## 2017-05-22 PROCEDURE — 78452 HT MUSCLE IMAGE SPECT MULT: CPT

## 2017-05-22 PROCEDURE — 93018 CV STRESS TEST I&R ONLY: CPT | Mod: GC | Performed by: INTERNAL MEDICINE

## 2017-05-22 PROCEDURE — 34300033 ZZH RX 343: Performed by: FAMILY MEDICINE

## 2017-05-22 RX ORDER — REGADENOSON 0.08 MG/ML
0.4 INJECTION, SOLUTION INTRAVENOUS ONCE
Status: COMPLETED | OUTPATIENT
Start: 2017-05-22 | End: 2017-05-22

## 2017-05-22 RX ADMIN — TETROFOSMIN 10.4 MCI.: 0.23 INJECTION, POWDER, LYOPHILIZED, FOR SOLUTION INTRAVENOUS at 09:22

## 2017-05-22 RX ADMIN — REGADENOSON 0.4 MG: 0.08 INJECTION, SOLUTION INTRAVENOUS at 10:36

## 2017-05-22 RX ADMIN — TETROFOSMIN 38.1 MCI.: 0.23 INJECTION, POWDER, LYOPHILIZED, FOR SOLUTION INTRAVENOUS at 11:21

## 2017-05-22 NOTE — PROGRESS NOTES
Patient is educated on the procedure including the medications that will be used and their possible side effects.  Patient verbalizes understanding.  Patient does have a history of chronic bronchitis and cough.  Lungs are clear but sounds are diminished likely due to her morbid obesity and history of smoking.  Patient tolerated the Lexiscan injection though did have a coughing spell which she says is usual for her.  VSS.  Patient is monitored x 10 mn post stress and then is escorted on foot back to nuclear medicine.

## 2017-08-02 ENCOUNTER — OFFICE VISIT (OUTPATIENT)
Dept: FAMILY MEDICINE | Facility: CLINIC | Age: 57
End: 2017-08-02

## 2017-08-02 VITALS
HEART RATE: 99 BPM | TEMPERATURE: 98 F | BODY MASS INDEX: 64.08 KG/M2 | DIASTOLIC BLOOD PRESSURE: 79 MMHG | WEIGHT: 293 LBS | SYSTOLIC BLOOD PRESSURE: 147 MMHG | RESPIRATION RATE: 20 BRPM

## 2017-08-02 DIAGNOSIS — M25.561 RIGHT KNEE PAIN, UNSPECIFIED CHRONICITY: Primary | ICD-10-CM

## 2017-08-02 DIAGNOSIS — G56.03 BILATERAL CARPAL TUNNEL SYNDROME: ICD-10-CM

## 2017-08-02 RX ORDER — MELOXICAM 15 MG/1
15 TABLET ORAL DAILY
Qty: 30 TABLET | Refills: 1 | Status: SHIPPED | OUTPATIENT
Start: 2017-08-02 | End: 2018-02-02

## 2017-08-02 ASSESSMENT — PAIN SCALES - GENERAL: PAINLEVEL: EXTREME PAIN (8)

## 2017-08-02 NOTE — NURSING NOTE
Chief Complaint   Patient presents with     Fatigue     Here for fatigue; hasn't been feeling well     Pain     Also here for right knee and back pain     Excessive Sweating     Also here for concerns about sweating and feeling hot mostly during the late afternoon.     Oral Carreon CMA at 8:49 AM on 8/2/2017

## 2017-08-02 NOTE — MR AVS SNAPSHOT
After Visit Summary   8/2/2017    Shira Rodriguez    MRN: [id number]           Patient Information     Date Of Birth          1960        Visit Information        Provider Department      8/2/2017 8:35 AM Anjel Busby MD [facility]        Today's Diagnoses     Right knee pain, unspecified chronicity    -  1    Bilateral carpal tunnel syndrome          Care Instructions    Primary Care Center Medication Refill Request Information:  * Please contact your pharmacy regarding ANY request for medication refills.  ** [facility] Prescription Fax = [phone]  * Please allow 3 business days for routine medication refills.  * Please allow 5 business days for controlled substance medication refills.     [facility] Center Test Result notification information:  *You will be notified with in 7-10 days of your appointment day regarding the results of your test.  If you are on MyChart you will be notified as soon as the provider has reviewed the results and signed off on them.    [facility] [phone]             Follow-ups after your visit        Follow-up notes from your care team     Return in about 2 months (around 10/2/2017).      Your next 10 appointments already scheduled     Aug 02, 2017 12:15 PM CDT   XR KNEE STANDING RIGHT 2 VIEWS with UCXR1   [facility] ([facility])    [address] [address] [phone]           Please bring a list of your current medicines to your exam. (Include vitamins, minerals and over-thecounter medicines.) Leave your valuables at home.  Tell your doctor if there is a chance you may be pregnant.  You do not need to do anything special for this exam.            Aug 14, 2017  8:40 AM CDT   Return Visit with Tashi Saxena MD   [facility] ([facility])    [address] [place] [address] [phone]             Oct 02, 2017  7:05 AM CDT   (Arrive by 6:50 AM)   Return Visit with Anjel Busby MD   [facility] ([facility])    [address] [facility]  4th Floor  [employer] [address] [phone]            Oct 16, 2017  8:30 AM CDT   (Arrive by 8:15 AM)   Return Visit with Rosita Chandra MD   [facility] ([facility])    [address] [facility]  3rd Floor  [employer] [address] [phone]              Future tests that were ordered for you today     Open Future Orders        Priority Expected Expires Ordered    XR Knee Standing Right 2 Views Routine 8/2/2017 8/2/2018 8/2/2017            Who to contact     Please call your clinic at [phone] to:    Ask questions about your health    Make or cancel appointments    Discuss your medicines    Learn about your test results    Speak to your doctor   If you have compliments or concerns about an experience at your clinic, or if you wish to file a complaint, please contact [facility] Patient Relations at [phone] or email us at [email or web address]         Additional Information About Your Visit        [employer] On Call Information     [employer] gives you secure access to your electronic health record. If you see a primary care provider, you can also send messages to your care team and make appointments. If you have questions, please call your primary care clinic.  If you do not have a primary care provider, please call [phone] and they will assist you.      [employer]id is an electronic gateway that provides easy, online access to your medical records. With [employer], you can request a clinic appointment, read your test results, renew a prescription or communicate with your care team.     To access your existing account, please contact your [facility] or call [phone] for assistance.        Care EveryWhere ID     This is  your Care EveryWhere ID. This could be used by other organizations to access your [place] medical records  [id number]        Your Vitals Were     Pulse Temperature Respirations Breastfeeding? BMI (Body Mass Index)       99 98  F (36.7  C) (Oral) 20 No 64.08 kg/m2        Blood Pressure from Last 3 Encounters:   08/02/17 147/79   05/22/17 151/61   05/15/17 126/79    Weight from Last 3 Encounters:   08/02/17 (!) 180.1 kg (397 lb)   05/15/17 (!) 172.6 kg (380 lb 9.6 oz)   04/27/17 (!) 172.8 kg (380 lb 14.4 oz)                 Today's Medication Changes          These changes are accurate as of: 8/2/17  9:26 AM.  If you have any questions, ask your nurse or doctor.               Start taking these medicines.        Dose/Directions    meloxicam 15 MG tablet   Commonly known as:  MOBIC   Used for:  Bilateral carpal tunnel syndrome   Started by:  Ajnel Busby MD        Dose:  15 mg   Take 1 tablet (15 mg) by mouth daily   Quantity:  30 tablet   Refills:  1         These medicines have changed or have updated prescriptions.        Dose/Directions    atorvastatin 20 MG tablet   Commonly known as:  LIPITOR   This may have changed:  when to take this        Dose:  20 mg   Take 1 tablet (20 mg) by mouth daily   Quantity:  90 tablet   Refills:  3            Where to get your medicines      These medications were sent to [facility] [id number] - [place], [place] - [address] [facility]  [address], [place] [address]    Hours:  24-hours Phone:  [phone]     meloxicam 15 MG tablet                Primary Care Provider Office Phone # Fax #    Anjel Busby [phone]       [facility] [address] [place] [address]        Equal Access to Services     VARSHA FLOREZ AH: Dutch sanderso Soalisha, waaxda luqadaha, qaybta kaalmada adeegyada, waxay kelsea tello. So [facility] [phone].    ATENCIÓN: Si ila torres,  tiene a samano disposición servicios gratuitos de asistencia lingüística. Jorge short [phone].    We comply with applicable federal civil rights laws and [place] laws. We do not discriminate on the basis of race, color, national origin, age, disability sex, sexual orientation or gender identity.            Thank you!     Thank you for choosing [facility]  for your care. Our goal is always to provide you with excellent care. Hearing back from our patients is one way we can continue to improve our services. Please take a few minutes to complete the written survey that you may receive in the mail after your visit with us. Thank you!             Your Updated Medication List - Protect others around you: Learn how to safely use, store and throw away your medicines at www.disposemymeds.org.          This list is accurate as of: 8/2/17  9:26 AM.  Always use your most recent med list.                   Brand Name Dispense Instructions for use Diagnosis    ADVIL PO      Take 200 mg by mouth        albuterol 108 (90 BASE) MCG/ACT Inhaler    PROAIR HFA/PROVENTIL HFA/VENTOLIN HFA    1 Inhaler    Inhale 2 puffs into the lungs every 6 hours as needed for shortness of breath / dyspnea or wheezing    SANTOS (dyspnea on exertion)       aspirin 81 MG tablet      Take 81 mg by mouth every morning        atorvastatin 20 MG tablet    LIPITOR    90 tablet    Take 1 tablet (20 mg) by mouth daily        budesonide-formoterol 160-4.5 MCG/ACT Inhaler    SYMBICORT    1 Inhaler    Inhale 2 puffs into the lungs 2 times daily    Cough       fluocinonide 0.05 % ointment    LIDEX    60 g    Apply topically daily To hands and feet when dermatitis active    Dermatitis       lisinopril 10 MG tablet    PRINIVIL/ZESTRIL    90 tablet    Take 1 tablet (10 mg) by mouth daily    Essential hypertension       meloxicam 15 MG tablet    MOBIC    30 tablet    Take 1 tablet (15 mg) by mouth daily    Bilateral carpal tunnel syndrome       order for  DME      Equipment being ordered: BIPAP 19/13 CM H20 AIRCURVE 10 AIRFIT F20 SN# [id number]   DN# [id number]        SERTRALINE HCL PO      Take 150 mg by mouth every morning

## 2017-08-02 NOTE — PATIENT INSTRUCTIONS
[facility] Medication Refill Request Information:  * Please contact your pharmacy regarding ANY request for medication refills.  ** [facility] Prescription Fax = [phone]  * Please allow 3 business days for routine medication refills.  * Please allow 5 business days for controlled substance medication refills.     [facility] Test Result notification information:  *You will be notified with in 7-10 days of your appointment day regarding the results of your test.  If you are on MyChart you will be notified as soon as the provider has reviewed the results and signed off on them.    [facility] [phone]

## 2017-08-02 NOTE — PROGRESS NOTES
SUBJECTIVE:    Pt is a 57 year old female with pmh of     Patient Active Problem List   Diagnosis     Primary localized osteoarthrosis, pelvic region and thigh     Adjustment disorder with depressed mood     Tobacco use disorder     Morbid obesity (H)     Arthritis of knee, degenerative     Degeneration of cervical intervertebral disc     CYRUS (obstructive sleep apnea)     Elevated blood pressure (not hypertension)       who is here for evaluation of had concerns including Fatigue; Pain; and Excessive Sweating.    Here for a few things.  One, since last saw me, gradually worse right knee pain, no trauma. Not red/warm/swollen. Hurts mainly in the back. No pain if sits, lying in bed hurts w/ position change, if walking hurts constantly.  Two, occasional lumbago. No sciatica.  Three, recently pain left posterior heel w/o trauma.  Four, has CYRUS, not using CPAP cannot tolerate, discussed as she does have ongoing fatigue that is likely contributory, she does get some mild leg swelling still as well and that also could be at least in part from untreated CYRUS.  Five, no GI sx since last seen.  Six, sees a virtual psychiatrist, they've been increasing zoloft but still feels depressed (hard to find good job plus health are stressors). Open to seeing Josemanuel Sprague.    Allergies   Allergen Reactions     Adhesive Tape Blisters     Erythromycin Rash             Current Outpatient Prescriptions   Medication Sig Dispense Refill     Ibuprofen (ADVIL PO) Take 200 mg by mouth       meloxicam (MOBIC) 15 MG tablet Take 1 tablet (15 mg) by mouth daily 30 tablet 1     order for DME Equipment being ordered: BIPAP  19/13 CM H20  AIRCURVE 10  AIRFIT F20  SN# [id number]   DN# [id number]       aspirin 81 MG tablet Take 81 mg by mouth every morning        lisinopril (PRINIVIL/ZESTRIL) 10 MG tablet Take 1 tablet (10 mg) by mouth daily 90 tablet 3     atorvastatin (LIPITOR) 20 MG tablet Take 1 tablet (20 mg) by mouth daily (Patient taking differently:  Take 20 mg by mouth every evening ) 90 tablet 3     SERTRALINE HCL PO Take 150 mg by mouth every morning        albuterol (PROAIR HFA/PROVENTIL HFA/VENTOLIN HFA) 108 (90 BASE) MCG/ACT Inhaler Inhale 2 puffs into the lungs every 6 hours as needed for shortness of breath / dyspnea or wheezing 1 Inhaler 0     budesonide-formoterol (SYMBICORT) 160-4.5 MCG/ACT inhaler Inhale 2 puffs into the lungs 2 times daily 1 Inhaler 1     fluocinonide (LIDEX) 0.05 % ointment Apply topically daily To hands and feet when dermatitis active (Patient not taking: Reported on 8/2/2017) 60 g 2       Social History   Substance Use Topics     Smoking status: Current Every Day Smoker     Packs/day: 1.00     Years: 33.00     Types: Cigarettes     Start date: 1/1/1982     Smokeless tobacco: Never Used     Alcohol use 0.0 oz/week     0 Standard drinks or equivalent per week      Comment: OCCASSIONALLY 2 DRINKS PER MONTH             OBJECTIVE:  /79 (BP Location: Right arm, Patient Position: Chair, Cuff Size: Adult Regular)  Pulse 99  Temp 98  F (36.7  C) (Oral)  Resp 20  Wt (!) 180.1 kg (397 lb)  Breastfeeding? No  BMI 64.08 kg/m2  GENERAL APPEARANCE: Alert, no acute distress  RESP: lungs clear to auscultation   CV: normal rate, regular rhythm, no murmur or gallop  MS: One plus bilat pitting edema; right post knee mild tender  NEURO: Alert, oriented, speech and mentation normal  PSYCHE: mentation appears normal, affect and mood normal    ASSESSMENT/PLAN:    Knee pain: xray, see Sp Med. Try meloxicam in meantime  Left foot pain posterior heel  Lumbago  She'll discuss all three at Sp Med visit    Depression; cont w/ psychiatry, see Josemanuel Sprague    I stressed to her to f/u CYRUS, she states will make own appt, rvwd why    1/2 hr visit over half couns/coord care majority knee pain          AMANDA JULIEN MD

## 2017-08-03 ENCOUNTER — DOCUMENTATION ONLY (OUTPATIENT)
Dept: SLEEP MEDICINE | Facility: CLINIC | Age: 57
End: 2017-08-03

## 2017-08-03 ENCOUNTER — MYC MEDICAL ADVICE (OUTPATIENT)
Dept: ORTHOPEDICS | Facility: CLINIC | Age: 57
End: 2017-08-03

## 2017-08-03 NOTE — PROGRESS NOTES
Patient not using machine is causes her Bronchitis to flair up and makes her cough. Spoke with patient about Dental Devices and positional devices. Patient will schedule an appointment with her sleep Provider.

## 2017-08-07 ENCOUNTER — PRE VISIT (OUTPATIENT)
Dept: ORTHOPEDICS | Facility: CLINIC | Age: 57
End: 2017-08-07

## 2017-08-07 DIAGNOSIS — G89.29 CHRONIC PAIN OF RIGHT KNEE: Primary | ICD-10-CM

## 2017-08-07 DIAGNOSIS — M25.561 CHRONIC PAIN OF RIGHT KNEE: Primary | ICD-10-CM

## 2017-08-07 NOTE — TELEPHONE ENCOUNTER
1.  Date/reason for appt: 8/8/17 8:45AM Right Knee steroid injection. Has never had an injection on her Right Knee  2.  Referring provider: Self   3.  Call to patient (Yes / No - short description): Yes, called to check if there are any other outside recs. Pt confirmed no outside recs, everything is at the [facility].   4.  Previous care at / records requested from:  [facility]UMMON[facility] -- Recs are in Epic / Images in PACS   Dr. Quesada -HX of left knee 2012 -- Recs are in Epic / Images in PACS   Left Knee Replacement 6/14/2012

## 2017-08-08 ENCOUNTER — OFFICE VISIT (OUTPATIENT)
Dept: ORTHOPEDICS | Facility: CLINIC | Age: 57
End: 2017-08-08

## 2017-08-08 ENCOUNTER — TELEPHONE (OUTPATIENT)
Dept: INTERNAL MEDICINE | Facility: CLINIC | Age: 57
End: 2017-08-08

## 2017-08-08 ENCOUNTER — MYC MEDICAL ADVICE (OUTPATIENT)
Dept: INTERNAL MEDICINE | Facility: CLINIC | Age: 57
End: 2017-08-08

## 2017-08-08 VITALS — BODY MASS INDEX: 47.09 KG/M2 | WEIGHT: 293 LBS | HEIGHT: 66 IN

## 2017-08-08 DIAGNOSIS — M17.11 ARTHRITIS OF RIGHT KNEE: Primary | ICD-10-CM

## 2017-08-08 RX ORDER — TRIAMCINOLONE ACETONIDE 40 MG/ML
40 INJECTION, SUSPENSION INTRA-ARTICULAR; INTRAMUSCULAR ONCE
Qty: 1 ML | Refills: 0 | OUTPATIENT
Start: 2017-08-08 | End: 2017-08-08

## 2017-08-08 RX ORDER — LIDOCAINE HYDROCHLORIDE 10 MG/ML
9 INJECTION, SOLUTION INFILTRATION; PERINEURAL ONCE
Qty: 9 ML | Refills: 0 | OUTPATIENT
Start: 2017-08-08 | End: 2017-08-08

## 2017-08-08 NOTE — PROGRESS NOTES
Patient is a 57-year-old female well-known to me. She is status post a left total knee replacement and 6 / 2012.    She is doing fine with that knee. She is not here for that knee.  I last saw her in 2012    She is here with complaints of her opposite right knee. She's always had some mild arthritis. She felt that she was maintaining it well enough into L a few weeks ago when she woke up with pain for no known reason. She has had significant difficulty with that knee since that time.    She's always been a large woman. At the time of her 2012 surgery she was she had a BMI of 59.    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  She was let go last year. She did attempt job for 4 months.    She admits that she's been depressed since she has lost her job. She admits to being sedentary and having difficulty time being motivated.    She has tried the Georgette program and successfully lost 25 pounds. She also tried a sudden diet and lost 20 pounds. However she seems to boomerang back again in most of the weight back after she goes off the specific dietary program    She has briefly looked in the bariatric surgery but never followed through on any specific consultation.    She does like to swim. She has talked about trying to get into a swimming program. She used to belong to [employer] lifetime but can no longer afford it.    Physical exam reveals a large woman whose BMI today computer to 64.    Examination of patient's left knee revealed a benign-appearing incision range of motion 10  to 100 . The Welcome works well through an active arc of motion stable to varus valgus stressing    Examination of patient's right knee is difficult. She cannot hold it in one position for very long. She has marked limitation of motion today 30  to 80  to a painful arc.    X-rays were performed. AP standing of both legs long leg alignment films. I lateral only of the right knee.    The long leg alignment views  show bone-on-bone changes on the right medial side with a varus deformity  Measuring 10 degrees biomechnic. On her left operated side she has some narrowing of the medial joint space. I cannot find her femoral head but her anatomic alignment is a few degrees of varus.    Today I have agreed to inject her right knee    HISTORY OF PRESENT ILLNESS:  Shira Rodriguez is a 57 year old female with right knee pain.  Diagnosis is knee arthritis supported by history, physical exam, and imaging.    PROCEDURE:  After informed verbal and writtten consent, under sterile conditions, patient's rightr knee was injected with 9 cc of Lidocaine and 1 cc of Kenalog (40 mg/ml).       Patient had good pain relief upon leaving the clinic, and will follow up with us on a as needed basis.    Plan I've had a long discussion with Shira in regards to trying to get her weight under control as well as her depression. We talked about various strategies. I believe she knows the road to better success its now up to her to take it.    She also reconsider visiting with the bariatric surgeon.    Room time 22 minutes consultation time 15

## 2017-08-08 NOTE — LETTER
8/8/2017       RE: Shira Rodriguez  [address] [place] [facility] [place] [address]     Dear Colleague,    Thank you for referring your patient, Shira Rodriguez, to the [facility] at [facility]. Please see a copy of my visit note below.    Patient is a 57-year-old female well-known to me. She is status post a left total knee replacement and 6 / 2012.    She is doing fine with that knee. She is not here for that knee.  I last saw her in 2012    She is here with complaints of her opposite right knee. She's always had some mild arthritis. She felt that she was maintaining it well enough into L a few weeks ago when she woke up with pain for no known reason. She has had significant difficulty with that knee since that time.    She's always been a large woman. At the time of her 2012 surgery she was she had a BMI of 59.    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  She was let go last year. She did attempt job for 4 months.    She admits that she's been depressed since she has lost her job. She admits to being sedentary and having difficulty time being motivated.    She has tried the Georgette program and successfully lost 25 pounds. She also tried a sudden diet and lost 20 pounds. However she seems to boomerang back again in most of the weight back after she goes off the specific dietary program    She has briefly looked in the bariatric surgery but never followed through on any specific consultation.    She does like to swim. She has talked about trying to get into a swimming program. She used to belong to [employer] lifetime but can no longer afford it.    Physical exam reveals a large woman whose BMI today computer to 64.    Examination of patient's left knee revealed a benign-appearing incision range of motion 10  to 100 . The Welcome works well through an active arc of motion stable to varus valgus stressing    Examination of patient's right  knee is difficult. She cannot hold it in one position for very long. She has marked limitation of motion today 30  to 80  to a painful arc.    X-rays were performed. AP standing of both legs long leg alignment films. I lateral only of the right knee.    The long leg alignment views show bone-on-bone changes on the right medial side with a varus deformity  Measuring 10 degrees biomechnic. On her left operated side she has some narrowing of the medial joint space. I cannot find her femoral head but her anatomic alignment is a few degrees of varus.    Today I have agreed to inject her right knee    Plan I've had a long discussion with Shira in regards to trying to get her weight under control as well as her depression. We talked about various strategies. I believe she knows the road to better success its now up to her to take it.    She also reconsider visiting with the bariatric surgeon.    Room time 22 minutes consultation time 15      Annette Quesada MD

## 2017-08-08 NOTE — NURSING NOTE
"Reason For Visit:   Chief Complaint   Patient presents with     Knee Pain     right knee pain , wants an injection, s/p left TKA 6/14/12     Primary: Dr. Chauhan  Ref. 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  Work status?  looking for work.  Date of injury: none    Date of surgery: 6/14/12  Type of surgery: left TKA.  Smoker: Yes      Ht 1.68 m (5' 6.14\")  Wt (!) 181.4 kg (399 lb 14.4 oz)  BMI 64.27 kg/m2      Pain Assessment  Patient Currently in Pain: Yes  0-10 Pain Scale: 9  Primary Pain Location: Knee  Pain Orientation: Right  Pain Descriptors: Sharp, Constant  Alleviating Factors: Rest, Ice  Aggravating Factors: Other (comment) (weight bearing)                                               Teaching Flowsheet   Relevant Diagnosis: right knee pain / arthritis  Teaching Topic: steroid injection to the right knee     Person(s) involved in teaching:   Patient     Motivation Level:  Asks Questions: Yes  Eager to Learn: Yes  Cooperative: Yes  Receptive (willing/able to accept information): Yes  Any cultural factors/Islam beliefs that may influence understanding or compliance? No       Patient demonstrates understanding of the following:  Reason for the appointment, diagnosis and treatment plan: Yes  Knowledge of proper use of medications and conditions for which they are ordered (with special attention to potential side effects or drug interactions): Yes  Which situations necessitate calling provider and whom to contact: Yes       Teaching Concerns Addressed:        Proper use and care of n/a (medical equip, care aids, etc.): NA  Nutritional needs and diet plan: NA  Pain management techniques: NA  Wound Care: NA  How and/when to access community resources: NA     Instructional Materials Used/Given: verbal instruction     [facility]  [address]  [address] [place] [address]  Dept: " [phone]  ______________________________________________________________________________    Patient: Shira Rodriguez   : 1960   MRN: [id number]   2017    INVASIVE PROCEDURE SAFETY CHECKLIST    Date: 2017   Procedure:steroid injection to the right knee  Patient Name: Shira Rodriguez  MRN: [id number]  YOB: 1960    Action: Complete sections as appropriate. Any discrepancy results in a HARD COPY until resolved.     PRE PROCEDURE:  Patient ID verified with 2 identifiers (name and  or MRN): Yes  Procedure and site verified with patient/designee (when able): Yes  Accurate consent documentation in medical record: Yes  H&P (or appropriate assessment) documented in medical record: NA  H&P must be up to 20 days prior to procedure and updates within 24 hours of procedure as applicable: NA  Relevant diagnostic and radiology test results appropriately labeled and displayed as applicable: Yes  Procedure site(s) marked with provider initials: NA    TIMEOUT:  Time-Out performed immediately prior to starting procedure, including verbal and active participation of all team members addressing the following:Yes  * Correct patient identify  * Confirmed that the correct side and site are marked  * An accurate procedure consent form  * Agreement on the procedure to be done  * Correct patient position  * Relevant images and results are properly labeled and appropriately displayed  * The need to administer antibiotics or fluids for irrigation purposes during the procedure as applicable   * Safety precautions based on patient history or medication use    DURING PROCEDURE: Verification of correct person, site, and procedures any time the responsibility for care of the patient is transferred to another member of the care team.       The following medication was given:     MEDICATION:  Lidocaine without epinephrine  ROUTE: intra articular  SITE: right knee  DOSE: 9 ml  LOT #: [id number]  :  [employer]  EXPIRATION DATE: 4/1/19  NDC#: 2467-1379-32   Was there drug waste? Yes  Amount of drug waste (mL): 11.  Reason for waste:  Single use vial      Dara Thomas LPN  August 8, 2017      The following medication was given:     MEDICATION:  Kenalog 40 mg  ROUTE: intra articular  SITE: right knee  DOSE: 1ml  LOT #: [id number]  : [employer]  EXPIRATION DATE: 10/2018  NDC#: 1378-7245-46   Was there drug waste? No      Dara Thomas LPN  August 8, 2017

## 2017-08-08 NOTE — TELEPHONE ENCOUNTER
Visit Activities (Refresh list every visit): phone call    I called Shira at Dr Busby's request to offer [facility] support/services.  No answer. Left VM explaining who I am, why I am calling, and inviting her to call back.     Also told her I would send  [employer] message with more information and contact info.    VM included my phone number for return call: [phone].      Josemanuel Sprague MA, LM  Lead Behavioral Health Clinician  [facility]    [email or web address]       Phone: [phone] (mobility extension)  Pager: [phone]

## 2017-08-08 NOTE — MR AVS SNAPSHOT
After Visit Summary   8/8/2017    Shira Rodriguez    MRN: [id number]           Patient Information     Date Of Birth          1960        Visit Information        Provider Department      8/8/2017 7:45 AM Annette Quesada MD [facility]        Today's Diagnoses     Arthritis of right knee    -  1       Follow-ups after your visit        Your next 10 appointments already scheduled     Oct 02, 2017  7:05 AM CDT   (Arrive by 6:50 AM)   Return Visit with Anjel Busby MD   [facility] ([facility])    [address] [place] [address] [phone]            Oct 16, 2017  8:30 AM CDT   (Arrive by 8:15 AM)   Return Visit with Rosita Chandra MD   [facility] ([facility])    [address]  [address] [place] [address] [phone]              Who to contact     Please call your clinic at [phone] to:    Ask questions about your health    Make or cancel appointments    Discuss your medicines    Learn about your test results    Speak to your doctor   If you have compliments or concerns about an experience at your clinic, or if you wish to file a complaint, please contact [facility] Patient Relations at [phone] or email us at [email or web address]         Additional Information About Your Visit        MyChart Information     [employer] gives you secure access to your electronic health record. If you see a primary care provider, you can also send messages to your care team and make appointments. If you have questions, please call your primary care clinic.  If you do not have a primary care provider, please call [phone] and they will assist you.      AGLOGIC is an electronic gateway that provides easy, online access to your medical records. With A[employer]IC, you can request a clinic appointment, read your test  "results, renew a prescription or communicate with your care team.     To access your existing account, please contact your [facility] or call [phone] for assistance.        Care EveryWhere ID     This is your Care EveryWhere ID. This could be used by other organizations to access your [facility] medical records  [id number]        Your Vitals Were     Height BMI (Body Mass Index)                1.68 m (5' 6.14\") 64.27 kg/m2           Blood Pressure from Last 3 Encounters:   08/02/17 147/79   05/22/17 151/61   05/15/17 126/79    Weight from Last 3 Encounters:   08/08/17 (!) 181.4 kg (399 lb 14.4 oz)   08/02/17 (!) 180.1 kg (397 lb)   05/15/17 (!) 172.6 kg (380 lb 9.6 oz)              Today, you had the following     No orders found for display         Today's Medication Changes          These changes are accurate as of: 8/8/17  9:27 AM.  If you have any questions, ask your nurse or doctor.               These medicines have changed or have updated prescriptions.        Dose/Directions    atorvastatin 20 MG tablet   Commonly known as:  LIPITOR   This may have changed:  when to take this        Dose:  20 mg   Take 1 tablet (20 mg) by mouth daily   Quantity:  90 tablet   Refills:  3                Primary Care Provider Office Phone # Fax #    Anjel Busby [phone]       [facility] [address] [place] [address]        Equal Access to Services     VARSHA FLOREZ AH: Dutch bocanegra Soalisha, waodalisda cristina, qaybta kaalmaandreas saenz. So [facility] [phone].    ATENCIÓN: Si habla español, tiene a samano disposición servicios gratuitos de asistencia lingüística. Llame al [phone].    We comply with applicable federal civil rights laws and [place] laws. We do not discriminate on the basis of race, color, national origin, age, disability sex, sexual orientation or gender identity.            Thank " you!     Thank you for choosing [facility]  for your care. Our goal is always to provide you with excellent care. Hearing back from our patients is one way we can continue to improve our services. Please take a few minutes to complete the written survey that you may receive in the mail after your visit with us. Thank you!             Your Updated Medication List - Protect others around you: Learn how to safely use, store and throw away your medicines at www.disposemymeds.org.          This list is accurate as of: 8/8/17  9:27 AM.  Always use your most recent med list.                   Brand Name Dispense Instructions for use Diagnosis    ADVIL PO      Take 200 mg by mouth        albuterol 108 (90 BASE) MCG/ACT Inhaler    PROAIR HFA/PROVENTIL HFA/VENTOLIN HFA    1 Inhaler    Inhale 2 puffs into the lungs every 6 hours as needed for shortness of breath / dyspnea or wheezing    SANTOS (dyspnea on exertion)       aspirin 81 MG tablet      Take 81 mg by mouth every morning        atorvastatin 20 MG tablet    LIPITOR    90 tablet    Take 1 tablet (20 mg) by mouth daily        budesonide-formoterol 160-4.5 MCG/ACT Inhaler    SYMBICORT    1 Inhaler    Inhale 2 puffs into the lungs 2 times daily    Cough       fluocinonide 0.05 % ointment    LIDEX    60 g    Apply topically daily To hands and feet when dermatitis active    Dermatitis       lisinopril 10 MG tablet    PRINIVIL/ZESTRIL    90 tablet    Take 1 tablet (10 mg) by mouth daily    Essential hypertension       meloxicam 15 MG tablet    MOBIC    30 tablet    Take 1 tablet (15 mg) by mouth daily    Bilateral carpal tunnel syndrome       order for DME      Equipment being ordered: BIPAP 19/13 CM H20 AIRCURVE 10 AIRFIT F20 SN# [id number]   DN# [id number]        SERTRALINE HCL PO      Take 150 mg by mouth every morning

## 2017-08-16 ENCOUNTER — TELEPHONE (OUTPATIENT)
Dept: INTERNAL MEDICINE | Facility: CLINIC | Age: 57
End: 2017-08-16

## 2017-08-16 DIAGNOSIS — G89.29 CHRONIC PAIN OF RIGHT KNEE: Primary | ICD-10-CM

## 2017-08-16 DIAGNOSIS — M25.561 CHRONIC PAIN OF RIGHT KNEE: Primary | ICD-10-CM

## 2017-08-16 RX ORDER — HYDROCODONE BITARTRATE AND ACETAMINOPHEN 5; 325 MG/1; MG/1
TABLET ORAL
Qty: 40 TABLET | Refills: 0 | Status: SHIPPED | OUTPATIENT
Start: 2017-08-16 | End: 2017-12-04

## 2017-08-16 NOTE — TELEPHONE ENCOUNTER
Visit Activities (Refresh list every visit): Phone Encounter    I called Shira to follow up on our [employer] messages.   Spoke with her about 5 minutes. She says she is very interested in arranging a meeting but needs to feel her pain is a bit more under control first. She has a new prescription she feels hopeful about - and will call me next week to set up a time to get together.    She already has my phone number for return call: [phone].      Josemanuel Sprague MA, JUANITO  Lead Behavioral Health Clinician  [facility]    [email or web address]       Phone: [phone] (mobility extension)  Pager: [phone]

## 2017-08-18 ENCOUNTER — PRE VISIT (OUTPATIENT)
Dept: SURGERY | Facility: CLINIC | Age: 57
End: 2017-08-18

## 2017-08-18 NOTE — TELEPHONE ENCOUNTER
1.  Date/reason for appt: 8/30/17 -- NBS  2.  Referring provider: Annette Quesada   3.  Call to patient (Yes / No - short description): no, referred  4.  Previous care at / records requested from: [facility] -- [facility] in [facility].     -[facility]

## 2017-08-19 ENCOUNTER — APPOINTMENT (OUTPATIENT)
Dept: ULTRASOUND IMAGING | Facility: CLINIC | Age: 57
End: 2017-08-19
Attending: EMERGENCY MEDICINE
Payer: COMMERCIAL

## 2017-08-19 ENCOUNTER — HOSPITAL ENCOUNTER (EMERGENCY)
Facility: CLINIC | Age: 57
Discharge: HOME | End: 2017-08-19
Attending: EMERGENCY MEDICINE | Admitting: EMERGENCY MEDICINE
Payer: COMMERCIAL

## 2017-08-19 ENCOUNTER — TELEPHONE (OUTPATIENT)
Dept: NURSING | Facility: CLINIC | Age: 57
End: 2017-08-19

## 2017-08-19 VITALS
BODY MASS INDEX: 63.48 KG/M2 | SYSTOLIC BLOOD PRESSURE: 135 MMHG | DIASTOLIC BLOOD PRESSURE: 76 MMHG | WEIGHT: 293 LBS | RESPIRATION RATE: 18 BRPM | TEMPERATURE: 97.8 F | OXYGEN SATURATION: 95 %

## 2017-08-19 DIAGNOSIS — M79.604 PAIN OF RIGHT LOWER EXTREMITY: ICD-10-CM

## 2017-08-19 DIAGNOSIS — M79.651 RIGHT THIGH PAIN: ICD-10-CM

## 2017-08-19 DIAGNOSIS — M25.561 RIGHT KNEE PAIN, UNSPECIFIED CHRONICITY: ICD-10-CM

## 2017-08-19 DIAGNOSIS — M79.89 BILATERAL HAND SWELLING: ICD-10-CM

## 2017-08-19 LAB
BASOPHILS # BLD AUTO: 0 10E9/L (ref 0–0.2)
BASOPHILS NFR BLD AUTO: 0.2 %
CRP SERPL-MCNC: 31.9 MG/L (ref 0–8)
DIFFERENTIAL METHOD BLD: ABNORMAL
EOSINOPHIL # BLD AUTO: 0.1 10E9/L (ref 0–0.7)
EOSINOPHIL NFR BLD AUTO: 0.7 %
ERYTHROCYTE [DISTWIDTH] IN BLOOD BY AUTOMATED COUNT: 13.6 % (ref 10–15)
ERYTHROCYTE [SEDIMENTATION RATE] IN BLOOD BY WESTERGREN METHOD: 14 MM/H (ref 0–30)
HCT VFR BLD AUTO: 46.1 % (ref 35–47)
HGB BLD-MCNC: 15.5 G/DL (ref 11.7–15.7)
IMM GRANULOCYTES # BLD: 0 10E9/L (ref 0–0.4)
IMM GRANULOCYTES NFR BLD: 0.1 %
LYMPHOCYTES # BLD AUTO: 1.3 10E9/L (ref 0.8–5.3)
LYMPHOCYTES NFR BLD AUTO: 14.4 %
MCH RBC QN AUTO: 29.4 PG (ref 26.5–33)
MCHC RBC AUTO-ENTMCNC: 33.6 G/DL (ref 31.5–36.5)
MCV RBC AUTO: 87 FL (ref 78–100)
MONOCYTES # BLD AUTO: 0.6 10E9/L (ref 0–1.3)
MONOCYTES NFR BLD AUTO: 7.3 %
NEUTROPHILS # BLD AUTO: 6.7 10E9/L (ref 1.6–8.3)
NEUTROPHILS NFR BLD AUTO: 77.3 %
PLATELET # BLD AUTO: 167 10E9/L (ref 150–450)
RBC # BLD AUTO: 5.28 10E12/L (ref 3.8–5.2)
WBC # BLD AUTO: 8.7 10E9/L (ref 4–11)

## 2017-08-19 PROCEDURE — 96372 THER/PROPH/DIAG INJ SC/IM: CPT | Performed by: EMERGENCY MEDICINE

## 2017-08-19 PROCEDURE — 99285 EMERGENCY DEPT VISIT HI MDM: CPT | Mod: 25 | Performed by: EMERGENCY MEDICINE

## 2017-08-19 PROCEDURE — 85652 RBC SED RATE AUTOMATED: CPT | Performed by: EMERGENCY MEDICINE

## 2017-08-19 PROCEDURE — 93971 EXTREMITY STUDY: CPT | Mod: RT

## 2017-08-19 PROCEDURE — 85025 COMPLETE CBC W/AUTO DIFF WBC: CPT | Performed by: EMERGENCY MEDICINE

## 2017-08-19 PROCEDURE — 99284 EMERGENCY DEPT VISIT MOD MDM: CPT | Mod: 25 | Performed by: EMERGENCY MEDICINE

## 2017-08-19 PROCEDURE — 86140 C-REACTIVE PROTEIN: CPT | Performed by: EMERGENCY MEDICINE

## 2017-08-19 PROCEDURE — 99285 EMERGENCY DEPT VISIT HI MDM: CPT | Mod: Z6 | Performed by: EMERGENCY MEDICINE

## 2017-08-19 PROCEDURE — 25000128 H RX IP 250 OP 636: Performed by: EMERGENCY MEDICINE

## 2017-08-19 RX ORDER — PREDNISONE 5 MG/1
TABLET ORAL
Qty: 35 TABLET | Refills: 0 | Status: SHIPPED | OUTPATIENT
Start: 2017-08-19 | End: 2017-08-22

## 2017-08-19 RX ORDER — KETOROLAC TROMETHAMINE 30 MG/ML
30 INJECTION, SOLUTION INTRAMUSCULAR; INTRAVENOUS ONCE
Status: COMPLETED | OUTPATIENT
Start: 2017-08-19 | End: 2017-08-19

## 2017-08-19 RX ADMIN — KETOROLAC TROMETHAMINE 30 MG: 30 INJECTION, SOLUTION INTRAMUSCULAR at 14:43

## 2017-08-19 NOTE — ED AVS SNAPSHOT
[facility] Emergency Department    [address] [address]    Phone:  [phone]    Fax:  [phone]                                       Shira Rodriguez   MRN: [id number]    Department:  [facility] Emergency Department   Date of Visit:  8/19/2017           After Visit Summary Signature Page     I have received my discharge instructions, and my questions have been answered. I have discussed any challenges I see with this plan with the nurse or doctor.    ..........................................................................................................................................  Patient/Patient Representative Signature      ..........................................................................................................................................  Patient Representative Print Name and Relationship to Patient    ..................................................               ................................................  Date                                            Time    ..........................................................................................................................................  Reviewed by Signature/Title    ...................................................              ..............................................  Date                                                            Time

## 2017-08-19 NOTE — ED AVS SNAPSHOT
[facility] Emergency Department    [address] [address]    Phone:  [phone]    Fax:  [phone]                                       Shira Rodriguez   MRN: [id number]    Department:  [facility] Emergency Department   Date of Visit:  8/19/2017           Patient Information     Date Of Birth          1960        Your diagnoses for this visit were:     Bilateral hand swelling     Pain of right lower extremity        You were seen by Diogo Griffith MD.        Discharge Instructions       Return to the emergency Department for increased pain, swelling, fever, or other concerns.  Otherwise follow up in clinic.    Future Appointments        Provider Department Dept Phone Center    8/30/2017 9:00 AM Sabi Petersen PA-C [facility] Surgical Weight Management [phone] [facility]    8/30/2017 10:00 AM Blanka Kaur RD [facility] Surgical Weight Management [phone] [facility]    10/2/2017 7:05 AM Anjel Busby MD [facility] Primary Care Clinic [phone] [facility]    10/16/2017 8:30 AM Rosita Chandra MD [facility] Rheumatology [phone] [facility]      24 Hour Appointment Hotline       To make an appointment at any [facility], call [phone] ([phone]). If you don't have a family doctor or clinic, we will help you find one. [facility] are conveniently located to serve the needs of you and your family.             Review of your medicines      START taking        Dose / Directions Last dose taken    predniSONE 5 MG tablet   Commonly known as:  DELTASONE   Quantity:  35 tablet        Take 15 mg (3 tablets) daily for 7 days.  Then take 10 mg (2 tablets) daily for 7 days.   Refills:  0          Our records show that you are taking the medicines listed below. If these are incorrect, please call your family doctor or clinic.        Dose / Directions Last dose taken    ADVIL PO   Dose:  400 mg        Take 400 mg by mouth every 4 hours as  needed   Refills:  0        albuterol 108 (90 BASE) MCG/ACT Inhaler   Commonly known as:  PROAIR HFA/PROVENTIL HFA/VENTOLIN HFA   Dose:  2 puff   Quantity:  1 Inhaler        Inhale 2 puffs into the lungs every 6 hours as needed for shortness of breath / dyspnea or wheezing   Refills:  0        aspirin 81 MG tablet   Dose:  81 mg        Take 81 mg by mouth every morning   Refills:  0        atorvastatin 20 MG tablet   Commonly known as:  LIPITOR   Dose:  20 mg   Quantity:  90 tablet        Take 1 tablet (20 mg) by mouth daily   Refills:  3        budesonide-formoterol 160-4.5 MCG/ACT Inhaler   Commonly known as:  SYMBICORT   Dose:  2 puff   Quantity:  1 Inhaler        Inhale 2 puffs into the lungs 2 times daily   Refills:  1        fluocinonide 0.05 % ointment   Commonly known as:  LIDEX   Quantity:  60 g        Apply topically daily To hands and feet when dermatitis active   Refills:  2        HYDROcodone-acetaminophen 5-325 MG per tablet   Commonly known as:  NORCO   Quantity:  40 tablet        Take 1-2 tabs daily as needed for pain.   Refills:  0        lisinopril 10 MG tablet   Commonly known as:  PRINIVIL/ZESTRIL   Dose:  10 mg   Quantity:  90 tablet        Take 1 tablet (10 mg) by mouth daily   Refills:  3        meloxicam 15 MG tablet   Commonly known as:  MOBIC   Dose:  15 mg   Quantity:  30 tablet        Take 1 tablet (15 mg) by mouth daily   Refills:  1        order for DME        Equipment being ordered: BIPAP 19/13 CM H20 AIRCURVE 10 AIRFIT F20 SN# [id number]   DN# [id number]   Refills:  0        SERTRALINE HCL PO   Dose:  150 mg        Take 150 mg by mouth every morning   Refills:  0                Prescriptions were sent or printed at these locations (1 Prescription)                   [facility] - [place], [place] - [address] AT [place] [address] & [address], [place] [address]    Telephone:  [phone]   Fax:  [phone]   Hours:                   E-Prescribed (1 of 1)         predniSONE (DELTASONE) 5 MG tablet                Procedures and tests performed during your visit     CBC with platelets differential    CRP Inflammation    Erythrocyte sedimentation rate auto    US Lower Extremity Venous Duplex Right      Orders Needing Specimen Collection     None      Pending Results     Date and Time Order Name Status Description    8/19/2017 1341 CBC with platelets differential In process     8/19/2017 1341 Erythrocyte sedimentation rate auto In process     8/19/2017 1341 CRP Inflammation In process             Pending Culture Results     No orders found from 8/17/2017 to 8/20/2017.            Pending Results Instructions     If you had any lab results that were not finalized at the time of your Discharge, you can call the ED Lab Result RN at [phone]. You will be contacted by this team for any positive Lab results or changes in treatment. The nurses are available 7 days a week from 10A to 6:30P.  You can leave a message 24 hours per day and they will return your call.        Test Results From Your Hospital Stay        8/19/2017  2:19 PM      Narrative     VENOUS ULTRASOUND RIGHT LEG  8/19/2017 2:13 PM     HISTORY: posterior thigh pain, swelling    COMPARISON: None.    FINDINGS:  Examination of the deep veins with graded compression and  color flow Doppler with spectral wave form analysis shows no evidence  of thrombus in the common femoral vein, femoral vein, popliteal vein  or calf veins.  There is no venous insufficiency.        Impression     IMPRESSION: No evidence of deep venous thrombosis.    ELVIN WALKER MD         8/19/2017  2:59 PM         8/19/2017  2:59 PM         8/19/2017  2:59 PM                Thank you for choosing [facility]       Thank you for choosing [facility] for your care. Our goal is always to provide you with excellent care. Hearing back from our patients is one way we can continue to improve our services. Please take a few minutes to  complete the written survey that you may receive in the mail after you visit with us. Thank you!        [employer] Information     [employer] gives you secure access to your electronic health record. If you see a primary care provider, you can also send messages to your care team and make appointments. If you have questions, please call your primary care clinic.  If you do not have a primary care provider, please call [phone] and they will assist you.        Care EveryWhere ID     This is your Care EveryWhere ID. This could be used by other organizations to access your [facility] medical records  [id number]        Equal Access to Services     [facility] : Dutch Wilkinson, francisco evans, angie lozano, andreas tello. So [facility] [phone].    ATENCIÓN: Si habla español, tiene a samano disposición servicios gratuitos de asistencia lingüística. Llame al [phone].    We comply with applicable federal civil rights laws and [place] laws. We do not discriminate on the basis of race, color, national origin, age, disability sex, sexual orientation or gender identity.            After Visit Summary       This is your record. Keep this with you and show to your community pharmacist(s) and doctor(s) at your next visit.

## 2017-08-19 NOTE — ED PROVIDER NOTES
History     Chief Complaint   Patient presents with     Knee Pain     right knee pain, joint pain, edema in extremities with Difficulty ambulating.  pt received norco from orthopedic provider 2 weeks ago with no relief.     SHOLA Rodriguez is a 57 year old female with a history of osteoarthritis who presents to the ED with right posterior thigh and knee pain. The patient has chronic problems with the right knee, but has noticed recently that it has been increasingly difficult to get around. She has chronic lower extremity edema, but states the swelling in the right leg is worse than the left. The patient states she is due to have a right total knee replacement, but needs to lose weight before she can undergo the surgery. She was seen by her orthopedic surgeon a few weeks ago for a steroid injection in the knee, but has not experienced relief.  Denies any new injury.  Additionally, the patient complains arthralgias, swelling and numbness in her hands/feet. She has been diagnosed with carpal tunnel in the past and was seen by a rheumatologist to rule out RA. Roughly 8 months ago, she had an injection in the right hand to treat the carpal tunnel and was advised to wear braces on her wrists night. The patient has been taking Norco for her knee and hand pain but has not experienced relief. She has been treated with steroids in the past with significant relief and is requesting a prescription for this today. There is no additional history to report.    Past Medical History: Morbid obesity, CYRUS, elevated blood pressure, adjustment disorder, and osteoarthritis.    Past Surgical History: R total hip replacement, L total knee arthroplasty.  Daily Medications: Norco, Mobic, Lisinopril, Atorvastatin, Sertraline, Albuterol, Symbicort  Allergies: Erythromycin  Social History: Daily smoker.     I have reviewed the Medications, Allergies, Past Medical and Surgical History, and Social History in the Epic system.    Review of  Systems  Pertinent positives and negatives listed in the HPI, all other review of systems negative.     Physical Exam   BP: 135/76  Heart Rate: 95  Temp: 97.8  F (36.6  C)  Resp: 18  Weight: (!) 179.2 kg (395 lb)  SpO2: 95 %  Physical Exam   Constitutional: She is oriented to person, place, and time. She appears well-developed and well-nourished. She appears distressed.   HENT:   Head: Normocephalic and atraumatic.   Right Ear: External ear normal.   Left Ear: External ear normal.   Nose: Nose normal.   Eyes: Conjunctivae are normal. No scleral icterus.   Neck: Normal range of motion.   Cardiovascular: Normal rate and regular rhythm.    Pulmonary/Chest: Effort normal. No stridor. No respiratory distress.   Abdominal: Soft. She exhibits no distension.   Musculoskeletal:   Mild swelling and tenderness of the right hand MCP joints, dorsum of hand, and PIP joints. Tenderness to palpation of the left hand and bilateral feet.  No erythema, excess warmth, or fluctuance. Lower extremity swelling bilaterally without erythema, excess warmth.   Neurological: She is alert and oriented to person, place, and time.   Skin: Skin is warm and dry. She is not diaphoretic.   Psychiatric: She has a normal mood and affect. Her behavior is normal.   Nursing note and vitals reviewed.      ED Course     ED Course     Procedures             Critical Care time:  none               Labs Ordered and Resulted from Time of ED Arrival Up to the Time of Departure from the ED - No data to display    No results found for this or any previous visit (from the past 24 hour(s)).    Medications - No data to display    12:58 PM Patient Assessed      Assessments & Plan (with Medical Decision Making)   57-year-old female who presents for joint pain and right knee pain.  Differential includes chronic pain, DVT, soft tissue injury.  No signs of cellulitis or abscess on examination.  She does have some swelling and pain of the hands and feet bilaterally.   Ultrasound of the lower extremity obtained, images reviewed independently as well as radiology reviewed, no signs of DVT.  I did discuss the case with Dr. Austin, the on-call dermatologist, he recommended starting the patient on a two-week course of prednisone and having her follow-up in dermatology clinic.  The patient is asking for a shot of ketorolac prior to discharge and this is given.  She is discharged with 2 weeks of prednisone and instructions to return if she has fever, signs of infection, or other concerns.  Otherwise follow up in rheumatology clinic.  The patient is in agreement with this plan.    I have reviewed the nursing notes.    I have reviewed the findings, diagnosis, plan and need for follow up with the patient.       New Prescriptions    No medications on file       Final diagnoses:   None     This document serves as a record of the services and decisions personally performed and made by Diogo Griffith MD. It was created on HIS/HER behalf by Kevin Multani, a trained medical scribe. The creation of this document is based the provider's statements to the medical scribe.  Kevin Multani 12:58 PM 8/19/2017    Provider:   The information in this document, created by the medical scribe for me, accurately reflects the services I personally performed and the decisions made by me. I have reviewed and approved this document for accuracy prior to leaving the patient care area.  Diogo Griffith MD 12:58 PM 8/19/2017 8/19/2017   [facility] EMERGENCY DEPARTMENT     Diogo Griffith MD  08/19/17 9803

## 2017-08-19 NOTE — TELEPHONE ENCOUNTER
Patient left a message at the [facility] yesterday but didn't hear back. She is experiencing significant pain in her hands, etc and wants to discuss a course of steroids. She has requested a call back from the on call for the [facility] (Dr. Garcia). Paged to call patient back directly at [phone].    Farhana Jones RN  [facility]  [phone]

## 2017-08-19 NOTE — DISCHARGE INSTRUCTIONS
Return to the emergency Department for increased pain, swelling, fever, or other concerns.  Otherwise follow up in clinic.

## 2017-08-19 NOTE — ED NOTES
"R knee pain, known arthritis, \"bone on bone\". Went to ortho for steroid injection. States it didn't help. States she needs total knee done but cannot get the surgery until she looses weight. In Dec. She had bilat hand and arm pain. ? Pinched nerve in neck had neck MRI that was normal. Was dx with carpal tunnel. Bilat LE swelling started in Dec. Was + for Lyme and was treated. R/O for RA. Has steroids and that helped with swelling and hand and foot joint pain. Sept is next rheumatoid appointment.   Today, pt presents with R hand pain and R knee pain with joint swelling. Pt hoping to another steroid pack.     "

## 2017-08-22 ENCOUNTER — TELEPHONE (OUTPATIENT)
Dept: RHEUMATOLOGY | Facility: CLINIC | Age: 57
End: 2017-08-22

## 2017-08-22 DIAGNOSIS — M06.9 FLARE OF RHEUMATOID ARTHRITIS (H): Primary | ICD-10-CM

## 2017-08-22 RX ORDER — PREDNISONE 10 MG/1
TABLET ORAL
Qty: 15 TABLET | Refills: 0 | Status: SHIPPED | OUTPATIENT
Start: 2017-08-22 | End: 2017-12-04

## 2017-08-22 NOTE — TELEPHONE ENCOUNTER
Called and spoke with pt, currently on 15 mg a day for the last 4 days, and she is not getting any relief.  Pt continues to have increased swelling and pain in hands and knees.  Pt states that she is continuing to have a hard time walking, and hands feel like she is having a lot of burning.      Pt is having very light vaginal bleeding.  Pt has been through menopause already.  Last pap smear was in September of 2016 per pt.  Pt states it is very light, is continuing today.  But very light.    Have cued up a prednisone prescription for Dr. Chandra sign.      Ashli Barth RN  Rheumatology Clinic

## 2017-08-22 NOTE — TELEPHONE ENCOUNTER
----- Message from Rosita Chandra MD sent at 8/22/2017  1:23 PM CDT -----  Regarding: RE: Pt Symptoms  Please schedule for next Monday the 28th at 8 AM with me. I have a 730 and 830 that are both returns. How much prednisone is she on now? How much did it help? Looks like ED prescribed 15 mg prednisone. If helped some, but not enough, I can give 30 mg x3d, 20 mg x3d, which will carry her into the Monday appointment.  Thanks  Emma       ----- Message -----     From: Ashli Barth RN     Sent: 8/22/2017   1:13 PM       To: Rosita Chandra MD  Subject: RE: Pt Symptoms                                  Pt was in the ER last week, and it has been recommended that the pt be re-evaluated in the next 1-2 weeks.  You have no openings.  What would you like to do?    Thanks Ashli  ----- Message -----     From: Mouna Ford     Sent: 8/22/2017  12:31 PM       To: Adult Rheum Triage-  Subject: Pt Symptoms                                      Hey you guys!    I sent a message yesterday regarding this pt's MyChart message she sent for a sooner appointment for her [facility] HFU, she sent me another message today stating 'hands, legs, feet swollen, CRP inflammation blood test elevated' are the reasons the ED doc wants her to be seen sooner. Just wanted to give you that additional information.    Thanks!  Mouna  [facility] Referrals  [phone]

## 2017-08-23 ENCOUNTER — TELEPHONE (OUTPATIENT)
Dept: NEPHROLOGY | Facility: CLINIC | Age: 57
End: 2017-08-23

## 2017-08-23 NOTE — TELEPHONE ENCOUNTER
Patient returning call from Ashli.  Kinjal Calvillo LPN  Nephrology  [facility]  [phone]

## 2017-08-23 NOTE — TELEPHONE ENCOUNTER
Rosita Chandra MD Beard, Madeline, RN        Caller: Unspecified (Yesterday,  1:43 PM)                     Prescription signed   I see she has been added to my schedule, thank you   She should talk to her primary care physician regarding vaginal bleeding, this needs to be evaluated.   Thanks   Emma       Called and spoke with pt, discussed Dr. Chandra's recommendation to see PCP for vaginal bleeding.  Pt has updated provider, will see him soon.      Discussed that after increasing prednisone she has had some relief from knee pain, and feet don't seem as swollen, but still having hand swelling and numbness and pain.    Ashli Barth RN  Rheumatology Clinic

## 2017-08-23 NOTE — TELEPHONE ENCOUNTER
This has been addressed in Telephone encounter from 8/22/17.    Ashli Barth RN  Rheumatology Clinic

## 2017-08-28 ENCOUNTER — OFFICE VISIT (OUTPATIENT)
Dept: RHEUMATOLOGY | Facility: CLINIC | Age: 57
End: 2017-08-28
Attending: INTERNAL MEDICINE
Payer: COMMERCIAL

## 2017-08-28 VITALS
SYSTOLIC BLOOD PRESSURE: 157 MMHG | WEIGHT: 293 LBS | DIASTOLIC BLOOD PRESSURE: 88 MMHG | BODY MASS INDEX: 47.09 KG/M2 | TEMPERATURE: 97.4 F | HEART RATE: 84 BPM | HEIGHT: 66 IN

## 2017-08-28 DIAGNOSIS — M05.79 RHEUMATOID ARTHRITIS INVOLVING MULTIPLE SITES WITH POSITIVE RHEUMATOID FACTOR (H): ICD-10-CM

## 2017-08-28 DIAGNOSIS — Z79.899 HIGH RISK MEDICATION USE: ICD-10-CM

## 2017-08-28 DIAGNOSIS — M05.79 RHEUMATOID ARTHRITIS INVOLVING MULTIPLE SITES WITH POSITIVE RHEUMATOID FACTOR (H): Primary | ICD-10-CM

## 2017-08-28 LAB
ALBUMIN SERPL-MCNC: 3.1 G/DL (ref 3.4–5)
ALP SERPL-CCNC: 62 U/L (ref 40–150)
ALT SERPL W P-5'-P-CCNC: 21 U/L (ref 0–50)
AST SERPL W P-5'-P-CCNC: 10 U/L (ref 0–45)
BILIRUB DIRECT SERPL-MCNC: 0.1 MG/DL (ref 0–0.2)
BILIRUB SERPL-MCNC: 0.4 MG/DL (ref 0.2–1.3)
CREAT SERPL-MCNC: 0.64 MG/DL (ref 0.52–1.04)
CRP SERPL-MCNC: 14.1 MG/L (ref 0–8)
ERYTHROCYTE [SEDIMENTATION RATE] IN BLOOD BY WESTERGREN METHOD: 24 MM/H (ref 0–30)
GFR SERPL CREATININE-BSD FRML MDRD: >90 ML/MIN/1.7M2
PROT SERPL-MCNC: 7.4 G/DL (ref 6.8–8.8)
RHEUMATOID FACT SER NEPH-ACNC: 51 IU/ML (ref 0–20)

## 2017-08-28 PROCEDURE — 86431 RHEUMATOID FACTOR QUANT: CPT | Performed by: INTERNAL MEDICINE

## 2017-08-28 PROCEDURE — 86200 CCP ANTIBODY: CPT | Performed by: INTERNAL MEDICINE

## 2017-08-28 PROCEDURE — 25000128 H RX IP 250 OP 636: Mod: ZF | Performed by: INTERNAL MEDICINE

## 2017-08-28 PROCEDURE — G0009 ADMIN PNEUMOCOCCAL VACCINE: HCPCS

## 2017-08-28 PROCEDURE — 99212 OFFICE O/P EST SF 10 MIN: CPT | Mod: 25,ZF

## 2017-08-28 PROCEDURE — 90670 PCV13 VACCINE IM: CPT | Mod: ZF | Performed by: INTERNAL MEDICINE

## 2017-08-28 PROCEDURE — 85652 RBC SED RATE AUTOMATED: CPT | Performed by: INTERNAL MEDICINE

## 2017-08-28 PROCEDURE — 80076 HEPATIC FUNCTION PANEL: CPT | Performed by: INTERNAL MEDICINE

## 2017-08-28 PROCEDURE — 86140 C-REACTIVE PROTEIN: CPT | Performed by: INTERNAL MEDICINE

## 2017-08-28 PROCEDURE — 86480 TB TEST CELL IMMUN MEASURE: CPT | Performed by: INTERNAL MEDICINE

## 2017-08-28 PROCEDURE — 82565 ASSAY OF CREATININE: CPT | Performed by: INTERNAL MEDICINE

## 2017-08-28 PROCEDURE — 96372 THER/PROPH/DIAG INJ SC/IM: CPT

## 2017-08-28 RX ORDER — FOLIC ACID 1 MG/1
1 TABLET ORAL DAILY
Qty: 90 TABLET | Refills: 3 | Status: SHIPPED | OUTPATIENT
Start: 2017-08-28 | End: 2017-12-22

## 2017-08-28 RX ORDER — PREDNISONE 10 MG/1
TABLET ORAL
Qty: 100 TABLET | Refills: 1 | Status: SHIPPED | OUTPATIENT
Start: 2017-08-28 | End: 2017-10-16

## 2017-08-28 RX ORDER — METHYLPREDNISOLONE ACETATE 80 MG/ML
80 INJECTION, SUSPENSION INTRA-ARTICULAR; INTRALESIONAL; INTRAMUSCULAR; SOFT TISSUE ONCE
Status: COMPLETED | OUTPATIENT
Start: 2017-08-28 | End: 2017-08-28

## 2017-08-28 RX ADMIN — METHYLPREDNISOLONE ACETATE 80 MG: 80 INJECTION, SUSPENSION INTRA-ARTICULAR; INTRALESIONAL; INTRAMUSCULAR; SOFT TISSUE at 09:39

## 2017-08-28 RX ADMIN — PNEUMOCOCCAL 13-VALENT CONJUGATE VACCINE 0.5 ML: 2.2; 2.2; 2.2; 2.2; 2.2; 4.4; 2.2; 2.2; 2.2; 2.2; 2.2; 2.2; 2.2 INJECTION, SUSPENSION INTRAMUSCULAR at 09:41

## 2017-08-28 ASSESSMENT — PAIN SCALES - GENERAL: PAINLEVEL: EXTREME PAIN (8)

## 2017-08-28 NOTE — NURSING NOTE
"Chief Complaint   Patient presents with     RECHECK     Follow up appt.  hands, feet,  arthritis pain       Initial /88  Pulse 84  Temp 97.4  F (36.3  C) (Oral)  Ht 1.676 m (5' 6\")  Wt (!) 177.2 kg (390 lb 11.2 oz)  BMI 63.06 kg/m2 Estimated body mass index is 63.06 kg/(m^2) as calculated from the following:    Height as of this encounter: 1.676 m (5' 6\").    Weight as of this encounter: 177.2 kg (390 lb 11.2 oz).  Medication Reconciliation: complete  "

## 2017-08-28 NOTE — LETTER
8/28/2017       RE: Shira Rodriguez  [address] [facility] [address]     Dear Colleague,    Thank you for referring your patient, Shira Rodriguez, to the [facility] RHEUMATOLOGY at [facility]. Please see a copy of my visit note below.    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 Did discuss role of plaquenil at the time in preventing progression to RA, however given pt really lacked arthritis symptoms and had no synovitis on exam, she elected to hold off on plaquenil. Also previously discussed role in quitting smoking in potentially preventing progression to RA, however she continues to smoke 1 ppd.     Now w/ 1 mo of polyarticular inflammatory arthritis with synovitis on exam consistent with clinically active RA. Minimal improvement on moderate doses of prednisone. Will give IM depomedrol x1 in clinic today, have her continue prednisone taper, start methotrexate at 6 tabs weekly with plans to titrate up as able. Discussed risk/benefit of methotrexate and pt in agreement to start.     Plan:  - start MTX 6 tabs weekly x 4 wks, repeat labs then, if normal and still  having symptoms, increase to 10 tabs weekly and again repeat labs in 4 wks       Folic acid 1 mg qd       Limit EtOH to < 2 drinks per week        Use 2 forms of contraception (pt in menopause)        Labs q3m once on stable dosing   - depomedrol 80 mg IM x1 now  - continue prednisone burst/taper following depomedrol - 30 mg x5d, 20 mg x5d, 10 mg x5d, 5 mg daily until see her next  - XR BL hands/feet for baseline   - continue to work on quitting smoking  - keep appt with bariatric surgery/metabolic clinic this week  - f/u with ortho regarding needs for R TKA. Will need weight loss first   - repeat labs today - RF, CCP, LFT', Cr, UA, ESR/CRP, and quant gold in preparation for future biologic therapy   - prevnar 13 today   - see primary regarding post-menopausal bleeding     RTC in 6-8 weeks     I saw the patient with the fellow.  My exam and recomendations are as described.      Misael Renteria MD      Routine health maintenance:  HBVsAg: neg 4/2017  HBVcore: neg 4/2017  HCV: neg 4/2017  HIV: check today   Q gold (or T spot): check today   Vitamin D and calcium/bone health:   Cancer screenings (skin, breast, colon, pap): due for colonoscopy    Return in about 6 weeks (around 10/9/2017) for OK to split new slot.    Patient was seen and discussed with Dr. Renteria.     Rosita Chandra MD  Rheumatology Fellow  Pager [phone]          Active Problem List:     Patient Active Problem List    Diagnosis Date Noted     CYRUS (obstructive sleep apnea) 01/04/2017     Priority: Medium     1/11/2017(401#)-AHI 80, RDI 90, lowest oxygen saturation was 83%, bilevel PAP 19/13 cm/H20 effective.   1/30/2016-AHI 16.6, lowest oxygen saturation was 89%       Elevated blood pressure (not hypertension) 01/04/2017     Priority: Medium     Degeneration of cervical intervertebral disc 12/26/2016     Priority: Medium     Arthritis of knee, degenerative 06/14/2012     Priority: Medium     Morbid obesity (H) 01/11/2012     Priority: Medium  "    Adjustment disorder with depressed mood 10/27/2011     Priority: Medium     Tobacco use disorder 10/27/2011     Priority: Medium     Primary localized osteoarthrosis, pelvic region and thigh 06/02/2004     Priority: Medium            History of Present Illness:     Chief Complaint   Patient presents with     RECHECK     Follow up appt.  hands, feet,  arthritis pain     Initial rheum HPI 2/20/2017:  Patient is a 57 y/o M/F who presents today for rheumatology referral from Dr. Anjel Busby for bilateral CTS and abnormal RF and CCP.     The patient reports around Thanksgiving (3m ago) she developed pain in both hands (R>>L) that was initially intermittent and switching hands. Pain was not limited to the wrist or small joints in the hand, included \"the entire hand.\" The pain then became constant (R>>L) and making it difficult to do tasks using her hands (for example if used remote then sharp pain in her thumb). She was seen by a chiropracter which did help some, but pain continued. Also wore night wrist splints which she keya made the pain actually worse. Developed numbness/tingling in both hands digits 1-3 as well. After visits to her PCP and a neurologist she underwent MRI c-spine which was normal and BL UE EMG's which did confirm moderate median neuropathy on L and severe on R. Throughout this, she has had several episodes of swelling in her entire feet/ankles. During the first episode she was given a medrol dosepak which resolved the swelling in her feet within 1-2d and improved the CTS bilaterally. She had a 2nd episode of foot swelling and her PCP then tried a course of lasix without improvement. She was given a 2nd medrol dosepak again with improvement in foot swelling. During episodes of foot swelling she reports some mild uncomfortable feeling while walking presumably due to the swelling itself, but not actual red/hot/swollen joints. No history of gout.     She was given a R carpal tunnel injection by " "ortho mid January which resolved the pain in her R hand. She has NO pain, but residual numbness in fingertips 1-3 of both hands which she states \"she can live with.\"     Additional ROS includes fatigue for the past year. She recently was diagnosed with CYRUS and has been trying to wear BIPAP at night but difficult to be compliant (wakes up and she had taken mask off in her sleep). Also notes numbness of BL shins (not circumferential). Numbness of L shin started following her L TKA (was told they may have injected the nerve with marcaine following surgery years ago). Numbness in her R shin started about 1.5y ago following a fall where she developed a large hematoma on R lateral leg that required drainage. She is worried her nerves \"do not heal correctly.\"     She otherwise denies any EMS, pain/swelling/rednesss located in joints. Does have some mild dry mouth, but no dysphagia or difficulty chewing. No dry eyes. See rest of ROS below. Is a smoker x20y and has previously tried to quit (wellbutrin). Has also seen [employer] [facility] for weight loss - has lost about 12 lb.     Interim OV 8/28/2017:  For past 1 month has had polyarticular joint pain with swelling, stiffness. Started with pain/swelling in R knee, mostly posterior knee, along with pain in L heel. Went to see orthopedic physician who told her she had OA and injected R knee with steroid. Pt states steroid did not help as it has previously. Pain then started in BL ankles with swelling, then involved BL wrists/hands (MCP, PIP, thumbs), and BL shoulders. Occasional pain in R hip but thinks this is from walking abnormally due to heel/knee pain. Increased fatigue. Stressed out regarding a potential new job opportunity that she is worried she won't be able to take given the pain. 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 Continues to have CTS symptoms of her R hand, which she had this past winter and resolved with R CT steroid injection. Denies new skin changes/rashes, pleurisy, fam or " personal hx of psoriasis or IBD, abd pain.            Review of Systems:   Complete ROS negative except for symptoms mentioned in the HPI     No h/o  myalgia, unintentional weight loss, loss of appetite, fevers, swollen glands + fatigue, dyshidrotic eczema BL hands  No h/o gout  No family or personal history of psoriasis, UC or crohn's disease. No h/o iritis. No enthesitis, plantar fascitis.   Patient denies any raynauds, malar rash, photosensitivity, recurrent mouth/genital ulcers, sicca symptoms (+mild dry mouth), recurrent sinusitis/rhinitis  No h/o recurrent miscarriages or arterial/venous thrombosis in the past  No h/o chest pain + SOB with exertion, chronic cough occasionally productive (no hemoptysis)  No h/o persistent nausea, vomiting, constipation, diarrhea, abdominal pain +2 episodes band-like abdominal pain resolved on own   No h/o hematochezia, hematuria, hemoptysis, hematemesis  No h/o seizures or strokes  No h/o cancer         Past Medical History:     Past Medical History:   Diagnosis Date     Chronic bronchitis (H) 07/30/2015     Contact dermatitis      Eczema     HANDS     Elevated liver enzymes      H/O total knee replacement, left      History of total hip replacement 10/27/2011     Hyperlipidemia      Hypertension      Morbid obesity (H)      Osteoarthrosis     right knee     Sleep apnea      Tobacco use disorder      Past Surgical History:   Procedure Laterality Date     ARTHROPLASTY KNEE  6/14/2012    Procedure: ARTHROPLASTY KNEE;  Left Total Knee Arthroplasty;  Surgeon: Annette Quesada MD;  Location: [facility] OR     ARTHROSCOPY HIP Right      C TOTAL HIP ARTHROPLASTY  07/09/04    RT            Social History:     Social History     Occupational History     registered nurse      [employer]     Social History Main Topics     Smoking status: Current Every Day Smoker     Packs/day: 1.00     Years: 33.00     Types: Cigarettes     Start date: 1/1/1982     Smokeless tobacco: Never Used     Alcohol  use 0.0 oz/week     0 Standard drinks or equivalent per week      Comment: OCCASSIONALLY 2 DRINKS PER MONTH     Drug use: No     Sexual activity: Not on file            Family History:     Family History   Problem Relation Age of Onset     Thyroid Disease Mother      Hypertension Mother      Skin Cancer Mother      MMohs surgery with skin graft     CEREBROVASCULAR DISEASE Mother      CVA after endarderectomy     Breast Cancer Paternal Grandmother      Pancreatic Cancer Paternal Grandmother      Cardiovascular Paternal Aunt      Cardiovascular Paternal Uncle      Depression Other      Alcoholism Father      Thyroid Disease Sister      Alcoholism Sister      Hypertension Maternal Grandmother      HEART DISEASE Sister      multiple ablations     Alcoholism Sister      Prostate Cancer Paternal Grandfather    Both mother and sister with thyroid disease, she is unsure if this is autoimmune     No fam hx of RA, SLE, type 1 DM         Allergies:     Allergies   Allergen Reactions     Adhesive Tape Blisters     Erythromycin Rash            Medications:     Current Outpatient Prescriptions   Medication Sig Dispense Refill     methotrexate 2.5 MG tablet CHEMO Take 6 tablets (15 mg) by mouth once a week Take 6 tablets (15mg) once weekly. 24 tablet 2     folic acid (FOLVITE) 1 MG tablet Take 1 tablet (1 mg) by mouth daily 90 tablet 3     predniSONE (DELTASONE) 10 MG tablet Take 30 mg qd x5d, 20 mg x5d, 10 mg x5d, 5 mg daily until see rheumatology next 100 tablet 1     predniSONE (DELTASONE) 10 MG tablet Take 30 mg (3 tablets) daily x 3 days, then 20 mg (2 tablets) daily for 3 days (Patient taking differently: Take 20 mg by mouth 2 times daily Take 30 mg (3 tablets) daily x 3 days, then 20 mg (2 tablets) daily for 3 days) 15 tablet 0     Ibuprofen (ADVIL PO) Take 400 mg by mouth every 4 hours as needed        meloxicam (MOBIC) 15 MG tablet Take 1 tablet (15 mg) by mouth daily 30 tablet 1     order for DME Equipment being ordered:  "BIPAP  19/13 CM H20  AIRCURVE 10  AIRFIT F20  # [id number]   DN# [id number]       aspirin 81 MG tablet Take 81 mg by mouth every morning        lisinopril (PRINIVIL/ZESTRIL) 10 MG tablet Take 1 tablet (10 mg) by mouth daily 90 tablet 3     atorvastatin (LIPITOR) 20 MG tablet Take 1 tablet (20 mg) by mouth daily (Patient taking differently: Take 20 mg by mouth every evening ) 90 tablet 3     SERTRALINE HCL PO Take 150 mg by mouth every morning        albuterol (PROAIR HFA/PROVENTIL HFA/VENTOLIN HFA) 108 (90 BASE) MCG/ACT Inhaler Inhale 2 puffs into the lungs every 6 hours as needed for shortness of breath / dyspnea or wheezing 1 Inhaler 0     budesonide-formoterol (SYMBICORT) 160-4.5 MCG/ACT inhaler Inhale 2 puffs into the lungs 2 times daily 1 Inhaler 1     HYDROcodone-acetaminophen (NORCO) 5-325 MG per tablet Take 1-2 tabs daily as needed for pain. (Patient not taking: Reported on 8/28/2017) 40 tablet 0     fluocinonide (LIDEX) 0.05 % ointment Apply topically daily To hands and feet when dermatitis active (Patient not taking: Reported on 8/28/2017) 60 g 2            Physical Exam:   Blood pressure 157/88, pulse 84, temperature 97.4  F (36.3  C), temperature source Oral, height 1.676 m (5' 6\"), weight (!) 177.2 kg (390 lb 11.2 oz), not currently breastfeeding.  Wt Readings from Last 4 Encounters:   08/28/17 (!) 177.2 kg (390 lb 11.2 oz)   08/19/17 (!) 179.2 kg (395 lb)   08/08/17 (!) 181.4 kg (399 lb 14.4 oz)   08/02/17 (!) 180.1 kg (397 lb)     BP Readings from Last 3 Encounters:   08/28/17 157/88   08/19/17 135/76   08/02/17 147/79       Constitutional: well-developed, appearing stated age  Eyes: PERRLA, normal conjunctiva, sclera  ENT: nl external ears, nose, lips. No mucous membrane lesions, normal saliva pool  Neck: no cervical or supraclavicular lymphadenopathy, no parotid swelling, normal palpable thyroid  Resp: wheezing scattered throughout BL lungs, breathing comfortably on room air  CV: RRR, no m/r/g, no LE " edema  GI: soft, NT/ND, +BS, no HSM, morbidly obese  : not tested  Lymph: no cervical, supraclavicular or epitrochlear nodes  MSK: All joints including neck, shoulder, elbow, wrist, MCP/PIP/DIP, hip, knee, ankle, and foot MTP/PIP/DIP joints examined and found normal w/o synovitis or deformity.   BL hand MCP T2S1, PIP T2S1, DIP T1S0. BL wrists T1S1, BL elbows T1S0, knees T1S0, ankles T1S1, MTP T1S0 with positive MTP squeeze. FROM all joints.   Fist 100%, normal  strength  No dactylitis,  tenosynovitis, enthesopathy.  Skin: no nail pitting; no nodules  Psych: nl judgement, orientation, memory, affect.  Neuro: CN II-XII grossly intact, moving all extremities equally, normal gait. Normal  strength, BL UE strength 5/5         Data:     Results for orders placed or performed during the hospital encounter of 08/19/17   US Lower Extremity Venous Duplex Right    Narrative    VENOUS ULTRASOUND RIGHT LEG  8/19/2017 2:13 PM     HISTORY: posterior thigh pain, swelling    COMPARISON: None.    FINDINGS:  Examination of the deep veins with graded compression and  color flow Doppler with spectral wave form analysis shows no evidence  of thrombus in the common femoral vein, femoral vein, popliteal vein  or calf veins.  There is no venous insufficiency.      Impression    IMPRESSION: No evidence of deep venous thrombosis.    ELVIN WALKER MD   CRP Inflammation   Result Value Ref Range    CRP Inflammation 31.9 (H) 0.0 - 8.0 mg/L   Erythrocyte sedimentation rate auto   Result Value Ref Range    Sed Rate 14 0 - 30 mm/h   CBC with platelets differential   Result Value Ref Range    WBC 8.7 4.0 - 11.0 10e9/L    RBC Count 5.28 (H) 3.8 - 5.2 10e12/L    Hemoglobin 15.5 11.7 - 15.7 g/dL    Hematocrit 46.1 35.0 - 47.0 %    MCV 87 78 - 100 fl    MCH 29.4 26.5 - 33.0 pg    MCHC 33.6 31.5 - 36.5 g/dL    RDW 13.6 10.0 - 15.0 %    Platelet Count 167 150 - 450 10e9/L    Diff Method Automated Method     % Neutrophils 77.3 %    % Lymphocytes  14.4 %    % Monocytes 7.3 %    % Eosinophils 0.7 %    % Basophils 0.2 %    % Immature Granulocytes 0.1 %    Absolute Neutrophil 6.7 1.6 - 8.3 10e9/L    Absolute Lymphocytes 1.3 0.8 - 5.3 10e9/L    Absolute Monocytes 0.6 0.0 - 1.3 10e9/L    Absolute Eosinophils 0.1 0.0 - 0.7 10e9/L    Absolute Basophils 0.0 0.0 - 0.2 10e9/L    Abs Immature Granulocytes 0.0 0 - 0.4 10e9/L     EMG 2017 - severe R side and moderate L side median neuropathy at wrist c/w BL CTS    Recent BMP, CBC normal  TSH 2016 nl  UA  nl other than 7 RBC  HCV negative  RF 38, CCP 9  CRP 11.3, ESR 10  Lyme Ab 1.57 (+), confirmatory IgG/IgM neg  EMERY neg    MRI c-spine - mild-mod DDD  XR BL knees  - c/w OA  XR R knee  - tricompartmental OA worse in medial compartment    EMG 2017:  Results:  Nerve conduction studies:  1. Right median-D2 sensory response is absent.    2. Left median-D2 sensory response shows moderately slowed CV and  moderately reduced amplitude.    3. Bilateral ulnar-D5 and radial sensory responses were normal.    4. Left median ulnar palmar interlatency difference was  prolonged.    5. Right median-APB motor response showed moderately prolonged  DL, mildly reduced amplitude, and normal CV in the forearm.    6. Left median-APB motor response showed mildly prolonged DL,  normal amplitude, and normal CV in the forearm.    7. Bilateral ulnar-ADM motor responses were normal.    8. Right median-lumbrical compared to ulnar-interossei  interlatency difference was prolonged.     Needle EM. Fibrillation potentials and positive sharp waves were seen in  the right APB muscle.    2. Large amplitude and/or long duration motor unit potentials  (MUP) with increased polyphasia were seen in the bilateral APB  muscles.    3. Rapidly firing MUPs with reduced recruitment were seen in the  bilateral APB muscles.      Interpretation:  This is an abnormal study. There is electrophysiologic evidence  of a severe right-sided and a moderate  left-sided median  neuropathy at the wrist (e.g., bilateral carpal tunnel syndrome).  Clinical correlation is recommended.      Rosita Chandra MD    Reviewed all relevant data including labs and imaging.         Again, thank you for allowing me to participate in the care of your patient.      Sincerely,    Rosita Chandra MD

## 2017-08-28 NOTE — MR AVS SNAPSHOT
After Visit Summary   8/28/2017    Shira Rodriguez    MRN: [id number]           Patient Information     Date Of Birth          1960        Visit Information        Provider Department      8/28/2017 8:00 AM Roista Chandra MD [facility]        Today's Diagnoses     Rheumatoid arthritis involving multiple sites with positive rheumatoid factor (H)    -  1    High risk medication use          Care Instructions    Labs today     xrays of hands and feet    prevnar 13 pneumonia vaccine today     IV depomedrol today   Then prednisone 30 mg daily x5d, 20 mg daily x5d, 10 mg daily x5d, 5 mg daily until see you next    Work on quitting smoking!!    Start methotrexate 6 tabs weekly x4 weeks, repeat labs, if labs look OK and still in significant pain/swelling, will increase to 10 tabs weekly and again repeat labs 4 weeks later. Eventually labs will be every 3 months.     Start folic acid 1 mg daily (to prevent side effects of methotrexate)    Return in 6-8 weeks          Follow-ups after your visit        Follow-up notes from your care team     Return in about 6 weeks (around 10/9/2017) for OK to split new slot.      Your next 10 appointments already scheduled     Aug 28, 2017  9:45 AM CDT   Lab with  LAB    [facility] ([facility])    [address] [place] [address] [phone]            Aug 28, 2017 10:00 AM CDT   XR HAND BILATERAL G/E 3 VIEWS with [email or web address]   [facility] ([facility])    [address] [place] [address] [phone]           Please bring a list of your current medicines to your exam. (Include vitamins, minerals and over-thecounter medicines.) Leave your valuables at home.  Tell your doctor if there is a chance you may be pregnant.  You do not need to do anything special for this exam.            Aug 28, 2017 10:10 AM CDT   XR FOOT BILATERAL G/E 3  VIEWS with [email or web address]   [facility] ([facility])    [address] [facility]  [address] [facility] [address] [phone]           Please bring a list of your current medicines to your exam. (Include vitamins, minerals and over-thecounter medicines.) Leave your valuables at home.  Tell your doctor if there is a chance you may be pregnant.  You do not need to do anything special for this exam.            Aug 30, 2017  9:00 AM CDT   (Arrive by 8:45 AM)   New Bariatric Surgery Consult with Sabi Petersen PA-C   [facility] ([facility])    [address] [place] [address]  [address] [facility] [address]   [phone]            Aug 30, 2017 10:00 AM CDT   NUTRITION VISIT with Blanka Kaur RD   [facility] ([facility])    [address] [place] [address]   [phone]            Oct 02, 2017  7:05 AM CDT   (Arrive by 6:50 AM)   Return Visit with Anjel Busby MD   [facility] ([facility])    [address] [place] [address]  [address] [facility] [address]   [phone]            Oct 16, 2017  8:30 AM CDT   (Arrive by 8:15 AM)   Return Visit with Rosita Chandra MD   [facility] ([facility])    [address] [place] [address]  [address] [facility] [address]   [phone]              Future tests that were ordered for you today     Open Standing Orders        Priority Remaining Interval Expires Ordered    ALT Routine 3/3 q4 wk 8/28/2018 8/28/2017    AST Routine 3/3 q4 wk 8/28/2018 8/28/2017    Creatinine Routine 3/3 q4 wk 8/28/2018 8/28/2017    CBC with platelets differential Routine 3/3 q4 wk 8/28/2018 8/28/2017    ESR Routine 3/3 q4 wk 8/28/2018 8/28/2017    CRP inflammation Routine 3/3 q4 wk 8/28/2018 8/28/2017          Open Future Orders        Priority  Expected Expires Ordered    Hepatic panel Routine  8/28/2018 8/28/2017    Rheumatoid factor Routine  8/28/2018 8/28/2017    Cyclic Citrullinated Peptide Antibody IgG Routine  8/28/2018 8/28/2017    ESR Routine  8/28/2018 8/28/2017    CRP inflammation Routine  8/28/2018 8/28/2017    M Tuberculosis by Quantiferon Gold Routine  8/28/2018 8/28/2017    Routine UA with microscopic Routine  8/28/2018 8/28/2017    Creatinine Routine  8/28/2018 8/28/2017    X-ray bl hand 3+ vw Routine 8/28/2017 3/26/2018 8/28/2017    X-ray bl Foot 3+ views Routine 8/28/2017 3/26/2018 8/28/2017            Who to contact     If you have questions or need follow up information about today's clinic visit or your schedule please contact [facility] RHE[facility]AT[facility] directly at [phone].  Normal or non-critical lab and imaging results will be communicated to you by [employer], letter or phone within 4 business days after the clinic has received the results. If you do not hear from us within 7 days, please contact the clinic through [employer] or phone. If you have a critical or abnormal lab result, we will notify you by phone as soon as possible.  Submit refill requests through [employer] or call your pharmacy and they will forward the refill request to us. Please allow 3 business days for your refill to be completed.          Additional Information About Your Visit        Ping[employer] Information     [employer] gives you secure access to your electronic health record. If you see a primary care provider, you can also send messages to your care team and make appointments. If you have questions, please call your primary care clinic.  If you do not have a primary care provider, please call [phone] and they will assist you.        Care EveryWhere ID     This is your Care EveryWhere ID. This could be used by other organizations to access your [facility] medical records  [id number]        Your Vitals Were     Pulse Temperature Height BMI (Body Mass Index)          84  "97.4  F (36.3  C) (Oral) 1.676 m (5' 6\") 63.06 kg/m2         Blood Pressure from Last 3 Encounters:   08/28/17 157/88   08/19/17 135/76   08/02/17 147/79    Weight from Last 3 Encounters:   08/28/17 (!) 177.2 kg (390 lb 11.2 oz)   08/19/17 (!) 179.2 kg (395 lb)   08/08/17 (!) 181.4 kg (399 lb 14.4 oz)              We Performed the Following     Pneumococcal vaccine 13 valent PCV13 IM (Prevnar) [30975]          Today's Medication Changes          These changes are accurate as of: 8/28/17  9:34 AM.  If you have any questions, ask your nurse or doctor.               Start taking these medicines.        Dose/Directions    folic acid 1 MG tablet   Commonly known as:  FOLVITE   Used for:  High risk medication use, Rheumatoid arthritis involving multiple sites with positive rheumatoid factor (H)   Started by:  Rosita Chandra MD        Dose:  1 mg   Take 1 tablet (1 mg) by mouth daily   Quantity:  90 tablet   Refills:  3       methotrexate 2.5 MG tablet CHEMO   Used for:  Rheumatoid arthritis involving multiple sites with positive rheumatoid factor (H)   Started by:  Rosita Chandra MD        Dose:  15 mg   Take 6 tablets (15 mg) by mouth once a week Take 6 tablets (15mg) once weekly.   Quantity:  24 tablet   Refills:  2         These medicines have changed or have updated prescriptions.        Dose/Directions    atorvastatin 20 MG tablet   Commonly known as:  LIPITOR   This may have changed:  when to take this        Dose:  20 mg   Take 1 tablet (20 mg) by mouth daily   Quantity:  90 tablet   Refills:  3       * predniSONE 10 MG tablet   Commonly known as:  DELTASONE   This may have changed:    - how much to take  - how to take this  - when to take this  - additional instructions   Used for:  Flare of rheumatoid arthritis (H)   Changed by:  Rosita Chandra MD        Take 30 mg (3 tablets) daily x 3 days, then 20 mg (2 tablets) daily for 3 days   Quantity:  15 tablet   Refills:  0       * predniSONE 10 " MG tablet   Commonly known as:  DELTASONE   This may have changed:  You were already taking a medication with the same name, and this prescription was added. Make sure you understand how and when to take each.   Used for:  Rheumatoid arthritis involving multiple sites with positive rheumatoid factor (H), High risk medication use   Changed by:  Rosita Chandra MD        Take 30 mg qd x5d, 20 mg x5d, 10 mg x5d, 5 mg daily until see rheumatology next   Quantity:  100 tablet   Refills:  1       * Notice:  This list has 2 medication(s) that are the same as other medications prescribed for you. Read the directions carefully, and ask your doctor or other care provider to review them with you.         Where to get your medicines      These medications were sent to [facility] [id number] - [place], [place] - [address] [facility] [address] [address] [facility] [address] [place] [address]    Hours:  24-hours Phone:  [phone]     folic acid 1 MG tablet    methotrexate 2.5 MG tablet CHEMO    predniSONE 10 MG tablet                Primary Care Provider Office Phone # Fax #    Anjel Busby [phone]       [address] [address]        Equal Access to Services     VARSHA FLOREZ AH: Dutch sanderso Soalisha, waaxda luqadaha, qaybta kaalmada adejosefayada, andreas tello. So [facility] [phone].    ATENCIÓN: Si habla español, tiene a samano disposición servicios gratuitos de asistencia lingüística. Llame al [phone].    We comply with applicable federal civil rights laws and [place] laws. We do not discriminate on the basis of race, color, national origin, age, disability sex, sexual orientation or gender identity.            Thank you!     Thank you for choosing [facility] RHEUM[facility]OLOG[facility]  for your care. Our goal is always to provide you with excellent care. Hearing back from our patients is one way we can continue to improve our  services. Please take a few minutes to complete the written survey that you may receive in the mail after your visit with us. Thank you!             Your Updated Medication List - Protect others around you: Learn how to safely use, store and throw away your medicines at www.disposemymeds.org.          This list is accurate as of: 8/28/17  9:34 AM.  Always use your most recent med list.                   Brand Name Dispense Instructions for use Diagnosis    ADVIL PO      Take 400 mg by mouth every 4 hours as needed        albuterol 108 (90 BASE) MCG/ACT Inhaler    PROAIR HFA/PROVENTIL HFA/VENTOLIN HFA    1 Inhaler    Inhale 2 puffs into the lungs every 6 hours as needed for shortness of breath / dyspnea or wheezing    SANTOS (dyspnea on exertion)       aspirin 81 MG tablet      Take 81 mg by mouth every morning        atorvastatin 20 MG tablet    LIPITOR    90 tablet    Take 1 tablet (20 mg) by mouth daily        budesonide-formoterol 160-4.5 MCG/ACT Inhaler    SYMBICORT    1 Inhaler    Inhale 2 puffs into the lungs 2 times daily    Cough       fluocinonide 0.05 % ointment    LIDEX    60 g    Apply topically daily To hands and feet when dermatitis active    Dermatitis       folic acid 1 MG tablet    FOLVITE    90 tablet    Take 1 tablet (1 mg) by mouth daily    High risk medication use, Rheumatoid arthritis involving multiple sites with positive rheumatoid factor (H)       HYDROcodone-acetaminophen 5-325 MG per tablet    NORCO    40 tablet    Take 1-2 tabs daily as needed for pain.    Chronic pain of right knee       lisinopril 10 MG tablet    PRINIVIL/ZESTRIL    90 tablet    Take 1 tablet (10 mg) by mouth daily    Essential hypertension       meloxicam 15 MG tablet    MOBIC    30 tablet    Take 1 tablet (15 mg) by mouth daily    Bilateral carpal tunnel syndrome       methotrexate 2.5 MG tablet CHEMO     24 tablet    Take 6 tablets (15 mg) by mouth once a week Take 6 tablets (15mg) once weekly.    Rheumatoid arthritis  involving multiple sites with positive rheumatoid factor (H)       order for DME      Equipment being ordered: BIPAP 19/13 CM H20 AIRCURVE 10 AIRFIT F20 # [id number]   DN# [id number]        * predniSONE 10 MG tablet    DELTASONE    15 tablet    Take 30 mg (3 tablets) daily x 3 days, then 20 mg (2 tablets) daily for 3 days    Flare of rheumatoid arthritis (H)       * predniSONE 10 MG tablet    DELTASONE    100 tablet    Take 30 mg qd x5d, 20 mg x5d, 10 mg x5d, 5 mg daily until see rheumatology next    Rheumatoid arthritis involving multiple sites with positive rheumatoid factor (H), High risk medication use       SERTRALINE HCL PO      Take 150 mg by mouth every morning        * Notice:  This list has 2 medication(s) that are the same as other medications prescribed for you. Read the directions carefully, and ask your doctor or other care provider to review them with you.

## 2017-08-28 NOTE — PROGRESS NOTES
[facility] - Rheumatology Clinic Visit     Shira Rodriguez MRN# [id number]   YOB: 1960    Primary care provider: Anjel Busby  Aug 28, 2017          Assessment and Plan:     Problem list:  # polyarticular inflammatory arthritis x1 mo consistent with new diagnosis of RA, hx low pos RF/CCP (38/9)  # BL CTS onset 11/2016, EMG 1/2017 c/w severe R side and moderate L side, improved s/p R CT inj (1/2017)  # morbid obesity  # low pos Lyme Ab 1.57, confirmatory Western blot IgG/M neg - 12/2016  # numbness BL shins (L onset 2004 following L TKA, R onset 2015 following RLE injury)  # OA - s/p L TKA, R TAWNY  # fam hx of thyroid disease ?autoimmune (mother, sister)  # smoker x20y, 1 ppd  # dyshidrotic eczema BL hands  # post-menopausal bleeding     Discussion/Plan:  Saw pt in 2/2017 for BL CTS in setting of low titer pos RF/CCP. Did discuss role of plaquenil at the time in preventing progression to RA, however given pt really lacked arthritis symptoms and had no synovitis on exam, she elected to hold off on plaquenil. Also previously discussed role in quitting smoking in potentially preventing progression to RA, however she continues to smoke 1 ppd.     Now w/ 1 mo of polyarticular inflammatory arthritis with synovitis on exam consistent with clinically active RA. Minimal improvement on moderate doses of prednisone. Will give IM depomedrol x1 in clinic today, have her continue prednisone taper, start methotrexate at 6 tabs weekly with plans to titrate up as able. Discussed risk/benefit of methotrexate and pt in agreement to start.     Plan:  - start MTX 6 tabs weekly x 4 wks, repeat labs then, if normal and still having symptoms, increase to 10 tabs weekly and again repeat labs in 4 wks       Folic acid 1 mg qd       Limit EtOH to < 2 drinks per week        Use 2 forms of contraception (pt in menopause)        Labs q3m once on stable dosing   - depomedrol 80 mg IM x1 now  - continue  prednisone burst/taper following depomedrol - 30 mg x5d, 20 mg x5d, 10 mg x5d, 5 mg daily until see her next  - XR BL hands/feet for baseline   - continue to work on quitting smoking  - keep appt with bariatric surgery/metabolic clinic this week  - f/u with ortho regarding needs for R TKA. Will need weight loss first   - repeat labs today - RF, CCP, LFT', Cr, UA, ESR/CRP, and quant gold in preparation for future biologic therapy   - prevnar 13 today   - see primary regarding post-menopausal bleeding     RTC in 6-8 weeks     I saw the patient with the fellow.  My exam and recomendations are as described.      Misael Renteria MD      Routine health maintenance:  HBVsAg: neg 4/2017  HBVcore: neg 4/2017  HCV: neg 4/2017  HIV: check today   Q gold (or T spot): check today   Vitamin D and calcium/bone health:   Cancer screenings (skin, breast, colon, pap): due for colonoscopy    Return in about 6 weeks (around 10/9/2017) for OK to split new slot.    Patient was seen and discussed with Dr. Renteria.     Rosita Chandra MD  Rheumatology Fellow  Pager [phone]          Active Problem List:     Patient Active Problem List    Diagnosis Date Noted     CYRUS (obstructive sleep apnea) 01/04/2017     Priority: Medium     1/11/2017(401#)-AHI 80, RDI 90, lowest oxygen saturation was 83%, bilevel PAP 19/13 cm/H20 effective.   1/30/2016-AHI 16.6, lowest oxygen saturation was 89%       Elevated blood pressure (not hypertension) 01/04/2017     Priority: Medium     Degeneration of cervical intervertebral disc 12/26/2016     Priority: Medium     Arthritis of knee, degenerative 06/14/2012     Priority: Medium     Morbid obesity (H) 01/11/2012     Priority: Medium     Adjustment disorder with depressed mood 10/27/2011     Priority: Medium     Tobacco use disorder 10/27/2011     Priority: Medium     Primary localized osteoarthrosis, pelvic region and thigh 06/02/2004     Priority: Medium            History of Present Illness:     Chief  "Complaint   Patient presents with     RECHECK     Follow up appt.  hands, feet,  arthritis pain     Initial rheum HPI 2/20/2017:  Patient is a 55 y/o M/F who presents today for rheumatology referral from Dr. Anjel Busby for bilateral CTS and abnormal RF and CCP.     The patient reports around Thanksgiving (3m ago) she developed pain in both hands (R>>L) that was initially intermittent and switching hands. Pain was not limited to the wrist or small joints in the hand, included \"the entire hand.\" The pain then became constant (R>>L) and making it difficult to do tasks using her hands (for example if used remote then sharp pain in her thumb). She was seen by a chiropracter which did help some, but pain continued. Also wore night wrist splints which she keya made the pain actually worse. Developed numbness/tingling in both hands digits 1-3 as well. After visits to her PCP and a neurologist she underwent MRI c-spine which was normal and BL UE EMG's which did confirm moderate median neuropathy on L and severe on R. Throughout this, she has had several episodes of swelling in her entire feet/ankles. During the first episode she was given a medrol dosepak which resolved the swelling in her feet within 1-2d and improved the CTS bilaterally. She had a 2nd episode of foot swelling and her PCP then tried a course of lasix without improvement. She was given a 2nd medrol dosepak again with improvement in foot swelling. During episodes of foot swelling she reports some mild uncomfortable feeling while walking presumably due to the swelling itself, but not actual red/hot/swollen joints. No history of gout.     She was given a R carpal tunnel injection by ortho mid January which resolved the pain in her R hand. She has NO pain, but residual numbness in fingertips 1-3 of both hands which she states \"she can live with.\"     Additional ROS includes fatigue for the past year. She recently was diagnosed with CYRUS and has been trying " "to wear BIPAP at night but difficult to be compliant (wakes up and she had taken mask off in her sleep). Also notes numbness of BL shins (not circumferential). Numbness of L shin started following her L TKA (was told they may have injected the nerve with marcaine following surgery years ago). Numbness in her R shin started about 1.5y ago following a fall where she developed a large hematoma on R lateral leg that required drainage. She is worried her nerves \"do not heal correctly.\"     She otherwise denies any EMS, pain/swelling/rednesss located in joints. Does have some mild dry mouth, but no dysphagia or difficulty chewing. No dry eyes. See rest of ROS below. Is a smoker x20y and has previously tried to quit (wellbutrin). Has also seen [facility] for weight loss - has lost about 12 lb.     Interim OV 8/28/2017:  For past 1 month has had polyarticular joint pain with swelling, stiffness. Started with pain/swelling in R knee, mostly posterior knee, along with pain in L heel. Went to see orthopedic physician who told her she had OA and injected R knee with steroid. Pt states steroid did not help as it has previously. Pain then started in BL ankles with swelling, then involved BL wrists/hands (MCP, PIP, thumbs), and BL shoulders. Occasional pain in R hip but thinks this is from walking abnormally due to heel/knee pain. Increased fatigue. Stressed out regarding a potential new job opportunity that she is worried she won't be able to take given the pain. 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 Continues to have CTS symptoms of her R hand, which she had this past winter and resolved with R CT steroid injection. Denies new skin changes/rashes, pleurisy, fam or personal hx of psoriasis or IBD, abd pain.            Review of Systems:   Complete ROS negative except for symptoms mentioned in the HPI     No h/o  myalgia, unintentional weight loss, loss of appetite, fevers, swollen glands + fatigue, dyshidrotic eczema BL hands  No h/o " gout  No family or personal history of psoriasis, UC or crohn's disease. No h/o iritis. No enthesitis, plantar fascitis.   Patient denies any raynauds, malar rash, photosensitivity, recurrent mouth/genital ulcers, sicca symptoms (+mild dry mouth), recurrent sinusitis/rhinitis  No h/o recurrent miscarriages or arterial/venous thrombosis in the past  No h/o chest pain + SOB with exertion, chronic cough occasionally productive (no hemoptysis)  No h/o persistent nausea, vomiting, constipation, diarrhea, abdominal pain +2 episodes band-like abdominal pain resolved on own   No h/o hematochezia, hematuria, hemoptysis, hematemesis  No h/o seizures or strokes  No h/o cancer         Past Medical History:     Past Medical History:   Diagnosis Date     Chronic bronchitis (H) 07/30/2015     Contact dermatitis      Eczema     HANDS     Elevated liver enzymes      H/O total knee replacement, left      History of total hip replacement 10/27/2011     Hyperlipidemia      Hypertension      Morbid obesity (H)      Osteoarthrosis     right knee     Sleep apnea      Tobacco use disorder      Past Surgical History:   Procedure Laterality Date     ARTHROPLASTY KNEE  6/14/2012    Procedure: ARTHROPLASTY KNEE;  Left Total Knee Arthroplasty;  Surgeon: Annette Quesada MD;  Location: [facility] OR     ARTHROSCOPY HIP Right      C TOTAL HIP ARTHROPLASTY  07/09/04    RT            Social History:     Social History     Occupational History     registered nurse      [employer]     Social History Main Topics     Smoking status: Current Every Day Smoker     Packs/day: 1.00     Years: 33.00     Types: Cigarettes     Start date: 1/1/1982     Smokeless tobacco: Never Used     Alcohol use 0.0 oz/week     0 Standard drinks or equivalent per week      Comment: OCCASSIONALLY 2 DRINKS PER MONTH     Drug use: No     Sexual activity: Not on file            Family History:     Family History   Problem Relation Age of Onset     Thyroid Disease Mother       Hypertension Mother      Skin Cancer Mother      MMohs surgery with skin graft     CEREBROVASCULAR DISEASE Mother      CVA after endarderectomy     Breast Cancer Paternal Grandmother      Pancreatic Cancer Paternal Grandmother      Cardiovascular Paternal Aunt      Cardiovascular Paternal Uncle      Depression Other      Alcoholism Father      Thyroid Disease Sister      Alcoholism Sister      Hypertension Maternal Grandmother      HEART DISEASE Sister      multiple ablations     Alcoholism Sister      Prostate Cancer Paternal Grandfather    Both mother and sister with thyroid disease, she is unsure if this is autoimmune     No fam hx of RA, SLE, type 1 DM         Allergies:     Allergies   Allergen Reactions     Adhesive Tape Blisters     Erythromycin Rash            Medications:     Current Outpatient Prescriptions   Medication Sig Dispense Refill     methotrexate 2.5 MG tablet CHEMO Take 6 tablets (15 mg) by mouth once a week Take 6 tablets (15mg) once weekly. 24 tablet 2     folic acid (FOLVITE) 1 MG tablet Take 1 tablet (1 mg) by mouth daily 90 tablet 3     predniSONE (DELTASONE) 10 MG tablet Take 30 mg qd x5d, 20 mg x5d, 10 mg x5d, 5 mg daily until see rheumatology next 100 tablet 1     predniSONE (DELTASONE) 10 MG tablet Take 30 mg (3 tablets) daily x 3 days, then 20 mg (2 tablets) daily for 3 days (Patient taking differently: Take 20 mg by mouth 2 times daily Take 30 mg (3 tablets) daily x 3 days, then 20 mg (2 tablets) daily for 3 days) 15 tablet 0     Ibuprofen (ADVIL PO) Take 400 mg by mouth every 4 hours as needed        meloxicam (MOBIC) 15 MG tablet Take 1 tablet (15 mg) by mouth daily 30 tablet 1     order for DME Equipment being ordered: BIPAP  19/13 CM H20  AIRCURVE 10  AIRFIT F20  # [id number]   DN# [id number]       aspirin 81 MG tablet Take 81 mg by mouth every morning        lisinopril (PRINIVIL/ZESTRIL) 10 MG tablet Take 1 tablet (10 mg) by mouth daily 90 tablet 3     atorvastatin (LIPITOR) 20 MG  "tablet Take 1 tablet (20 mg) by mouth daily (Patient taking differently: Take 20 mg by mouth every evening ) 90 tablet 3     SERTRALINE HCL PO Take 150 mg by mouth every morning        albuterol (PROAIR HFA/PROVENTIL HFA/VENTOLIN HFA) 108 (90 BASE) MCG/ACT Inhaler Inhale 2 puffs into the lungs every 6 hours as needed for shortness of breath / dyspnea or wheezing 1 Inhaler 0     budesonide-formoterol (SYMBICORT) 160-4.5 MCG/ACT inhaler Inhale 2 puffs into the lungs 2 times daily 1 Inhaler 1     HYDROcodone-acetaminophen (NORCO) 5-325 MG per tablet Take 1-2 tabs daily as needed for pain. (Patient not taking: Reported on 8/28/2017) 40 tablet 0     fluocinonide (LIDEX) 0.05 % ointment Apply topically daily To hands and feet when dermatitis active (Patient not taking: Reported on 8/28/2017) 60 g 2            Physical Exam:   Blood pressure 157/88, pulse 84, temperature 97.4  F (36.3  C), temperature source Oral, height 1.676 m (5' 6\"), weight (!) 177.2 kg (390 lb 11.2 oz), not currently breastfeeding.  Wt Readings from Last 4 Encounters:   08/28/17 (!) 177.2 kg (390 lb 11.2 oz)   08/19/17 (!) 179.2 kg (395 lb)   08/08/17 (!) 181.4 kg (399 lb 14.4 oz)   08/02/17 (!) 180.1 kg (397 lb)     BP Readings from Last 3 Encounters:   08/28/17 157/88   08/19/17 135/76   08/02/17 147/79       Constitutional: well-developed, appearing stated age  Eyes: PERRLA, normal conjunctiva, sclera  ENT: nl external ears, nose, lips. No mucous membrane lesions, normal saliva pool  Neck: no cervical or supraclavicular lymphadenopathy, no parotid swelling, normal palpable thyroid  Resp: wheezing scattered throughout BL lungs, breathing comfortably on room air  CV: RRR, no m/r/g, no LE edema  GI: soft, NT/ND, +BS, no HSM, morbidly obese  : not tested  Lymph: no cervical, supraclavicular or epitrochlear nodes  MSK: All joints including neck, shoulder, elbow, wrist, MCP/PIP/DIP, hip, knee, ankle, and foot MTP/PIP/DIP joints examined and found " normal w/o synovitis or deformity.   BL hand MCP T2S1, PIP T2S1, DIP T1S0. BL wrists T1S1, BL elbows T1S0, knees T1S0, ankles T1S1, MTP T1S0 with positive MTP squeeze. FROM all joints.   Fist 100%, normal  strength  No dactylitis,  tenosynovitis, enthesopathy.  Skin: no nail pitting; no nodules  Psych: nl judgement, orientation, memory, affect.  Neuro: CN II-XII grossly intact, moving all extremities equally, normal gait. Normal  strength, BL UE strength 5/5         Data:     Results for orders placed or performed during the hospital encounter of 08/19/17   US Lower Extremity Venous Duplex Right    Narrative    VENOUS ULTRASOUND RIGHT LEG  8/19/2017 2:13 PM     HISTORY: posterior thigh pain, swelling    COMPARISON: None.    FINDINGS:  Examination of the deep veins with graded compression and  color flow Doppler with spectral wave form analysis shows no evidence  of thrombus in the common femoral vein, femoral vein, popliteal vein  or calf veins.  There is no venous insufficiency.      Impression    IMPRESSION: No evidence of deep venous thrombosis.    ELVIN WALKER MD   CRP Inflammation   Result Value Ref Range    CRP Inflammation 31.9 (H) 0.0 - 8.0 mg/L   Erythrocyte sedimentation rate auto   Result Value Ref Range    Sed Rate 14 0 - 30 mm/h   CBC with platelets differential   Result Value Ref Range    WBC 8.7 4.0 - 11.0 10e9/L    RBC Count 5.28 (H) 3.8 - 5.2 10e12/L    Hemoglobin 15.5 11.7 - 15.7 g/dL    Hematocrit 46.1 35.0 - 47.0 %    MCV 87 78 - 100 fl    MCH 29.4 26.5 - 33.0 pg    MCHC 33.6 31.5 - 36.5 g/dL    RDW 13.6 10.0 - 15.0 %    Platelet Count 167 150 - 450 10e9/L    Diff Method Automated Method     % Neutrophils 77.3 %    % Lymphocytes 14.4 %    % Monocytes 7.3 %    % Eosinophils 0.7 %    % Basophils 0.2 %    % Immature Granulocytes 0.1 %    Absolute Neutrophil 6.7 1.6 - 8.3 10e9/L    Absolute Lymphocytes 1.3 0.8 - 5.3 10e9/L    Absolute Monocytes 0.6 0.0 - 1.3 10e9/L    Absolute Eosinophils  0.1 0.0 - 0.7 10e9/L    Absolute Basophils 0.0 0.0 - 0.2 10e9/L    Abs Immature Granulocytes 0.0 0 - 0.4 10e9/L     EMG 2017 - severe R side and moderate L side median neuropathy at wrist c/w BL CTS    Recent BMP, CBC normal  TSH 2016 nl  UA  nl other than 7 RBC  HCV negative  RF 38, CCP 9  CRP 11.3, ESR 10  Lyme Ab 1.57 (+), confirmatory IgG/IgM neg  EMERY neg    MRI c-spine - mild-mod DDD  XR BL knees  - c/w OA  XR R knee  - tricompartmental OA worse in medial compartment    EMG 2017:  Results:  Nerve conduction studies:  1. Right median-D2 sensory response is absent.    2. Left median-D2 sensory response shows moderately slowed CV and  moderately reduced amplitude.    3. Bilateral ulnar-D5 and radial sensory responses were normal.    4. Left median ulnar palmar interlatency difference was  prolonged.    5. Right median-APB motor response showed moderately prolonged  DL, mildly reduced amplitude, and normal CV in the forearm.    6. Left median-APB motor response showed mildly prolonged DL,  normal amplitude, and normal CV in the forearm.    7. Bilateral ulnar-ADM motor responses were normal.    8. Right median-lumbrical compared to ulnar-interossei  interlatency difference was prolonged.     Needle EM. Fibrillation potentials and positive sharp waves were seen in  the right APB muscle.    2. Large amplitude and/or long duration motor unit potentials  (MUP) with increased polyphasia were seen in the bilateral APB  muscles.    3. Rapidly firing MUPs with reduced recruitment were seen in the  bilateral APB muscles.      Interpretation:  This is an abnormal study. There is electrophysiologic evidence  of a severe right-sided and a moderate left-sided median  neuropathy at the wrist (e.g., bilateral carpal tunnel syndrome).  Clinical correlation is recommended.      Rosita Chandra MD    Reviewed all relevant data including labs and imaging.

## 2017-08-28 NOTE — PATIENT INSTRUCTIONS
Labs today     xrays of hands and feet    prevnar 13 pneumonia vaccine today     IV depomedrol today   Then prednisone 30 mg daily x5d, 20 mg daily x5d, 10 mg daily x5d, 5 mg daily until see you next    Work on quitting smoking!!    Start methotrexate 6 tabs weekly x4 weeks, repeat labs, if labs look OK and still in significant pain/swelling, will increase to 10 tabs weekly and again repeat labs 4 weeks later. Eventually labs will be every 3 months.     Start folic acid 1 mg daily (to prevent side effects of methotrexate)    Return in 6-8 weeks

## 2017-08-29 LAB
CCP AB SER IA-ACNC: 15 U/ML
M TB TUBERC IFN-G BLD QL: NEGATIVE
M TB TUBERC IFN-G/MITOGEN IGNF BLD: 0.01 IU/ML

## 2017-08-30 ENCOUNTER — MYC MEDICAL ADVICE (OUTPATIENT)
Dept: RHEUMATOLOGY | Facility: CLINIC | Age: 57
End: 2017-08-30

## 2017-09-01 ENCOUNTER — MYC MEDICAL ADVICE (OUTPATIENT)
Dept: RHEUMATOLOGY | Facility: CLINIC | Age: 57
End: 2017-09-01

## 2017-09-08 ENCOUNTER — OFFICE VISIT (OUTPATIENT)
Dept: FAMILY MEDICINE | Facility: CLINIC | Age: 57
End: 2017-09-08

## 2017-09-08 ENCOUNTER — CARE COORDINATION (OUTPATIENT)
Dept: SURGERY | Facility: CLINIC | Age: 57
End: 2017-09-08

## 2017-09-08 VITALS
HEART RATE: 92 BPM | WEIGHT: 293 LBS | OXYGEN SATURATION: 96 % | RESPIRATION RATE: 18 BRPM | BODY MASS INDEX: 62.79 KG/M2 | SYSTOLIC BLOOD PRESSURE: 146 MMHG | DIASTOLIC BLOOD PRESSURE: 89 MMHG

## 2017-09-08 DIAGNOSIS — Z12.11 SCREEN FOR COLON CANCER: Primary | ICD-10-CM

## 2017-09-08 DIAGNOSIS — N95.0 POSTMENOPAUSAL BLEEDING: ICD-10-CM

## 2017-09-08 ASSESSMENT — PAIN SCALES - GENERAL: PAINLEVEL: EXTREME PAIN (8)

## 2017-09-08 NOTE — PATIENT INSTRUCTIONS
[facility] Medication Refill Request Information:  * Please contact your pharmacy regarding ANY request for medication refills.  ** [facility] Prescription Fax = [phone]  * Please allow 3 business days for routine medication refills.  * Please allow 5 business days for controlled substance medication refills.     [facility] Test Result notification information:  *You will be notified with in 7-10 days of your appointment day regarding the results of your test.  If you are on MyChart you will be notified as soon as the provider has reviewed the results and signed off on them.    [facility] [phone]

## 2017-09-08 NOTE — NURSING NOTE
Chief Complaint   Patient presents with     Arthritis     Patient is here to discuss new Dx of RA     Sanna Doss CMA 9:41 AM on 9/8/2017.

## 2017-09-08 NOTE — PROGRESS NOTES
SUBJECTIVE:    Pt is a 57 year old female with pmh of     Patient Active Problem List   Diagnosis     Primary localized osteoarthrosis, pelvic region and thigh     Adjustment disorder with depressed mood     Tobacco use disorder     Morbid obesity (H)     Arthritis of knee, degenerative     Degeneration of cervical intervertebral disc     CYRUS (obstructive sleep apnea)     Elevated blood pressure (not hypertension)       who is here for evaluation of had concerns including Arthritis.    Here for f/u.  One, got new dx RA. Very hard for her to deal with, although glad to have dx, on pred taper, started mtx late August. Sees rheum next mo. Still having some pain/swelling, less than before last pred burst began.    Two, late August, two day light vag bleeding. Shes ten years post men. Is obese. No h/o uterine disease, per pt had normal pap/pelvic a year ago    Three, never did CYRUS f/u, not tolerating device, she is fatigued, could be RA but also CYRUS    Four, she knows will need TKR at some point, recent Dr Quesada injection did not help    Five, worse CTS sx, cortisone injection from Dr Saxena helped earlier this year, she's considering f/u there for repeat    Six, smoker, trying to quit on her own by substituting toosie pops; discussed our pharmD pgm, she'll consider    Seven, she knows to see Josemanuel Sprague here for all the stress of recent health problems and dx, she can self contact and states she will    Eight, job, trying to get a job, worried about how her health might impact getting and keeping a job. I told her if needs forms filled out to let me know.    Nine, obese, sees bariatrics soon, needs to lose weight to qualify for TKR, plus she knows will help CYRUS    Allergies   Allergen Reactions     Adhesive Tape Blisters     Erythromycin Rash           Current Outpatient Prescriptions   Medication Sig Dispense Refill     methotrexate 2.5 MG tablet CHEMO Take 6 tablets (15 mg) by mouth once a week Take 6 tablets  (15mg) once weekly. 24 tablet 2     folic acid (FOLVITE) 1 MG tablet Take 1 tablet (1 mg) by mouth daily 90 tablet 3     predniSONE (DELTASONE) 10 MG tablet Take 30 mg qd x5d, 20 mg x5d, 10 mg x5d, 5 mg daily until see rheumatology next 100 tablet 1     predniSONE (DELTASONE) 10 MG tablet Take 30 mg (3 tablets) daily x 3 days, then 20 mg (2 tablets) daily for 3 days (Patient taking differently: Take 20 mg by mouth 2 times daily Take 30 mg (3 tablets) daily x 3 days, then 20 mg (2 tablets) daily for 3 days) 15 tablet 0     HYDROcodone-acetaminophen (NORCO) 5-325 MG per tablet Take 1-2 tabs daily as needed for pain. 40 tablet 0     Ibuprofen (ADVIL PO) Take 400 mg by mouth every 4 hours as needed        meloxicam (MOBIC) 15 MG tablet Take 1 tablet (15 mg) by mouth daily 30 tablet 1     order for DME Equipment being ordered: BIPAP  19/13 CM H20  AIRCURVE 10  AIRFIT F20  SN# [id number]   DN# [id number]       aspirin 81 MG tablet Take 81 mg by mouth every morning        lisinopril (PRINIVIL/ZESTRIL) 10 MG tablet Take 1 tablet (10 mg) by mouth daily 90 tablet 3     atorvastatin (LIPITOR) 20 MG tablet Take 1 tablet (20 mg) by mouth daily (Patient taking differently: Take 20 mg by mouth every evening ) 90 tablet 3     SERTRALINE HCL PO Take 150 mg by mouth every morning        albuterol (PROAIR HFA/PROVENTIL HFA/VENTOLIN HFA) 108 (90 BASE) MCG/ACT Inhaler Inhale 2 puffs into the lungs every 6 hours as needed for shortness of breath / dyspnea or wheezing 1 Inhaler 0     fluocinonide (LIDEX) 0.05 % ointment Apply topically daily To hands and feet when dermatitis active 60 g 2     budesonide-formoterol (SYMBICORT) 160-4.5 MCG/ACT inhaler Inhale 2 puffs into the lungs 2 times daily 1 Inhaler 1       Social History   Substance Use Topics     Smoking status: Current Every Day Smoker     Packs/day: 1.00     Years: 33.00     Types: Cigarettes     Start date: 1/1/1982     Smokeless tobacco: Never Used      Comment: maybe     Alcohol use  0.0 oz/week      Comment: occassional           OBJECTIVE:  /89  Pulse 92  Resp 18  Wt (!) 176.4 kg (389 lb)  SpO2 96%  Breastfeeding? No  BMI 62.79 kg/m2  GENERAL APPEARANCE: Alert, no acute distress  NEURO: Alert, oriented, speech and mentation normal  PSYCHE: mentation appears normal, affect and mood normal    ASSESSMENT/PLAN:    Half hr visit over half couns/coord care majority discuss RA eval/treat and impact on getting/keeping job--consider OT eval/treat  CYRUS: she will self set up f/u  Situational stress: she will self set up Josemanuel Pelaez bldg: u/s, gyn, discussed  Obese: keep planned bariatrics visit  Smoker: offered pharmD, she'll try on her own  Cont w/ rheum as planned  She knows how to get into see Dr Saxena for CTS if she'd like, she knows sx might improve w/ RA rx    AMANDA JULIEN MD

## 2017-09-08 NOTE — MR AVS SNAPSHOT
After Visit Summary   9/8/2017    Shira Rodriguez    MRN: [id number]           Patient Information     Date Of Birth          1960        Visit Information        Provider Department      9/8/2017 9:35 AM Anjel Busby MD [facility]        Today's Diagnoses     Screen for colon cancer    -  1    Postmenopausal bleeding          Care Instructions    Primary Care Center Medication Refill Request Information:  * Please contact your pharmacy regarding ANY request for medication refills.  ** [facility] Prescription Fax = [phone]  * Please allow 3 business days for routine medication refills.  * Please allow 5 business days for controlled substance medication refills.     Primary Care Center Test Result notification information:  *You will be notified with in 7-10 days of your appointment day regarding the results of your test.  If you are on MyChart you will be notified as soon as the provider has reviewed the results and signed off on them.    [facility] [phone]             Follow-ups after your visit        Additional Services     OB/GYN REFERRAL       Your provider has referred you to:  [facility] [phone]   [email or web address]    Please be aware that coverage of these services is subject to the terms and limitations of your health insurance plan.  Call member services at your health plan with any benefit or coverage questions.      Please bring the following with you to your appointment:    (1) Any X-Rays, CTs or MRIs which have been performed.  Contact the facility where they were done to arrange for  prior to your scheduled appointment.   (2) List of current medications   (3) This referral request   (4) Any documents/labs given to you for this referral                  Follow-up notes from your care team     Return in about 1 month (around 10/8/2017).      Your  next 10 appointments already scheduled     Sep 13, 2017  8:00 AM CDT   US PELVIC COMPLETE W TRANSVAGINAL with [email or web address]   [facility] ([facility])    [address] [place] [id number] [phone]           Please bring a list of your medicines (including vitamins, minerals and over-the-counter drugs). Also, tell your doctor about any allergies you may have. Wear comfortable clothes and leave your valuables at home.  Adults: Drink six 8-ounce glasses of fluid one hour before your exam. Do NOT empty your bladder.  If you need to empty your bladder before your exam, try to release only a little bit of urine. Then, drink another 8oz glass of fluid.  Children: Children who are potty trained should drink at least 4 cups (32 oz) of liquid 45 minutes to one hour prior to the exam. The child s bladder must be full in order to achieve a diagnostic exam. If your child is very uncomfortable or has an urgent need to pee, please notify a technologist; they will try to find out how much longer the wait may be and provide instructions to help relieve the pressure. Occasionally it is medically necessary to insert a urinary catheter to fill the bladder.  Please call the Imaging Department at your exam site with any questions.            Sep 13, 2017  9:30 AM CDT   (Arrive by 9:15 AM)   New Bariatric Surgery Consult with Sabi Petersen PA-C   [facility] ([facility])    [address] [place] [address]   [phone]            Sep 13, 2017 10:00 AM CDT   NUTRITION VISIT with Blanka Kaur RD   [facility] ([facility])    [address] [place] [address]   [phone]            Sep 20, 2017  3:00 PM CDT   New Patient Visit with Kyra Sanchez MD   [facility] ([facility])    [place] [facility] [address]  [address]  [facility] [address]   [phone]            Oct 16, 2017  8:30 AM CDT   (Arrive by 8:15 AM)   Return Visit with Rosita Chandra MD   [facility] ([facility])    [address] [facility]  3rd [facility] [id number] [phone]            Oct 16, 2017  9:35 AM CDT   (Arrive by 9:20 AM)   Return Visit with Anjel Busby MD   [facility] ([facility])    [address] [facility]  4th Floor  [facility] [address] [phone]              Future tests that were ordered for you today     Open Future Orders        Priority Expected Expires Ordered    US Pelvic w/ Transvaginal Routine  9/8/2018 9/8/2017            Who to contact     Please call your clinic at [phone] to:    Ask questions about your health    Make or cancel appointments    Discuss your medicines    Learn about your test results    Speak to your doctor   If you have compliments or concerns about an experience at your clinic, or if you wish to file a complaint, please contact [facility] Patient Relations at [phone] or email us at [email or web address]         Additional Information About Your Visit        MyChart Information     [employer] gives you secure access to your electronic health record. If you see a primary care provider, you can also send messages to your care team and make appointments. If you have questions, please call your primary care clinic.  If you do not have a primary care provider, please call [phone] and they will assist you.      [employer] is an electronic gateway that provides easy, online access to your medical records. With [employer], you can request a clinic appointment, read your test results, renew a prescription or communicate with your care team.     To access your existing account, please contact your  [facility] or call [phone] for assistance.        Care EveryWhere ID     This is your Care EveryWhere ID. This could be used by other organizations to access your [place] medical records  [id number]        Your Vitals Were     Pulse Respirations Pulse Oximetry Breastfeeding? BMI (Body Mass Index)       92 18 96% No 62.79 kg/m2        Blood Pressure from Last 3 Encounters:   09/08/17 146/89   08/28/17 157/88   08/19/17 135/76    Weight from Last 3 Encounters:   09/08/17 (!) 176.4 kg (389 lb)   08/28/17 (!) 177.2 kg (390 lb 11.2 oz)   08/19/17 (!) 179.2 kg (395 lb)              We Performed the Following     OB/GYN REFERRAL          Today's Medication Changes          These changes are accurate as of: 9/8/17 11:45 AM.  If you have any questions, ask your nurse or doctor.               These medicines have changed or have updated prescriptions.        Dose/Directions    atorvastatin 20 MG tablet   Commonly known as:  LIPITOR   This may have changed:  when to take this        Dose:  20 mg   Take 1 tablet (20 mg) by mouth daily   Quantity:  90 tablet   Refills:  3       * predniSONE 10 MG tablet   Commonly known as:  DELTASONE   This may have changed:    - how much to take  - how to take this  - when to take this  - additional instructions   Used for:  Flare of rheumatoid arthritis (H)   Changed by:  Rosita Chandra MD        Take 30 mg (3 tablets) daily x 3 days, then 20 mg (2 tablets) daily for 3 days   Quantity:  15 tablet   Refills:  0       * predniSONE 10 MG tablet   Commonly known as:  DELTASONE   This may have changed:  Another medication with the same name was changed. Make sure you understand how and when to take each.   Used for:  Rheumatoid arthritis involving multiple sites with positive rheumatoid factor (H), High risk medication use   Changed by:  Rosita Chandra MD        Take 30 mg qd x5d, 20 mg x5d, 10 mg x5d, 5 mg daily until see rheumatology next    Quantity:  100 tablet   Refills:  1       * Notice:  This list has 2 medication(s) that are the same as other medications prescribed for you. Read the directions carefully, and ask your doctor or other care provider to review them with you.             Primary Care Provider Office Phone # Fax #    Anjel Busby [phone]        [address] [address]        Equal Access to Services     TIANA [facility] Hospital[facility]UNIER : Hadii aad ku hadasho Soomaali, waaxda luqadaha, qaybta kaalmada adeegyada, waxay idiin hayaan adeeg khdenluis antonio laregina . So [facility] [phone].    ATENCIÓN: Si habla español, tiene a samano disposición servicios gratuitos de asistencia lingüística. Llame al [phone].    We comply with applicable federal civil rights laws and [place] laws. We do not discriminate on the basis of race, color, national origin, age, disability sex, sexual orientation or gender identity.            Thank you!     Thank you for choosing [facility]  for your care. Our goal is always to provide you with excellent care. Hearing back from our patients is one way we can continue to improve our services. Please take a few minutes to complete the written survey that you may receive in the mail after your visit with us. Thank you!             Your Updated Medication List - Protect others around you: Learn how to safely use, store and throw away your medicines at www.disposemymeds.org.          This list is accurate as of: 9/8/17 11:45 AM.  Always use your most recent med list.                   Brand Name Dispense Instructions for use Diagnosis    ADVIL PO      Take 400 mg by mouth every 4 hours as needed        albuterol 108 (90 BASE) MCG/ACT Inhaler    PROAIR HFA/PROVENTIL HFA/VENTOLIN HFA    1 Inhaler    Inhale 2 puffs into the lungs every 6 hours as needed for shortness of breath / dyspnea or wheezing    SANTOS (dyspnea on exertion)       aspirin 81 MG tablet      Take 81 mg by mouth  every morning        atorvastatin 20 MG tablet    LIPITOR    90 tablet    Take 1 tablet (20 mg) by mouth daily        budesonide-formoterol 160-4.5 MCG/ACT Inhaler    SYMBICORT    1 Inhaler    Inhale 2 puffs into the lungs 2 times daily    Cough       fluocinonide 0.05 % ointment    LIDEX    60 g    Apply topically daily To hands and feet when dermatitis active    Dermatitis       folic acid 1 MG tablet    FOLVITE    90 tablet    Take 1 tablet (1 mg) by mouth daily    High risk medication use, Rheumatoid arthritis involving multiple sites with positive rheumatoid factor (H)       HYDROcodone-acetaminophen 5-325 MG per tablet    NORCO    40 tablet    Take 1-2 tabs daily as needed for pain.    Chronic pain of right knee       lisinopril 10 MG tablet    PRINIVIL/ZESTRIL    90 tablet    Take 1 tablet (10 mg) by mouth daily    Essential hypertension       meloxicam 15 MG tablet    MOBIC    30 tablet    Take 1 tablet (15 mg) by mouth daily    Bilateral carpal tunnel syndrome       methotrexate 2.5 MG tablet CHEMO     24 tablet    Take 6 tablets (15 mg) by mouth once a week Take 6 tablets (15mg) once weekly.    Rheumatoid arthritis involving multiple sites with positive rheumatoid factor (H)       order for DME      Equipment being ordered: BIPAP 19/13 CM H20 AIRCURVE 10 AIRFIT F20 # [id number]   DN# [id number]        * predniSONE 10 MG tablet    DELTASONE    15 tablet    Take 30 mg (3 tablets) daily x 3 days, then 20 mg (2 tablets) daily for 3 days    Flare of rheumatoid arthritis (H)       * predniSONE 10 MG tablet    DELTASONE    100 tablet    Take 30 mg qd x5d, 20 mg x5d, 10 mg x5d, 5 mg daily until see rheumatology next    Rheumatoid arthritis involving multiple sites with positive rheumatoid factor (H), High risk medication use       SERTRALINE HCL PO      Take 150 mg by mouth every morning        * Notice:  This list has 2 medication(s) that are the same as other medications prescribed for you. Read the directions  carefully, and ask your doctor or other care provider to review them with you.

## 2017-09-11 ENCOUNTER — TELEPHONE (OUTPATIENT)
Dept: RHEUMATOLOGY | Facility: CLINIC | Age: 57
End: 2017-09-11

## 2017-09-11 NOTE — TELEPHONE ENCOUNTER
Form Request Documentation    Date Received: September 11, 2017    Mode received: fax    Name of Form: [place] [employer]    The release portion on this form is not signed by the patient. Call placed to patient and message left of this information and asked patient to call back. My Chart message also sent.      Emily Garcia CMA  9/11/2017 8:47 AM

## 2017-09-13 ENCOUNTER — ALLIED HEALTH/NURSE VISIT (OUTPATIENT)
Dept: SURGERY | Facility: CLINIC | Age: 57
End: 2017-09-13

## 2017-09-13 ENCOUNTER — OFFICE VISIT (OUTPATIENT)
Dept: SURGERY | Facility: CLINIC | Age: 57
End: 2017-09-13

## 2017-09-13 VITALS
BODY MASS INDEX: 47.09 KG/M2 | SYSTOLIC BLOOD PRESSURE: 126 MMHG | HEIGHT: 66 IN | OXYGEN SATURATION: 96 % | WEIGHT: 293 LBS | DIASTOLIC BLOOD PRESSURE: 81 MMHG | HEART RATE: 96 BPM

## 2017-09-13 DIAGNOSIS — M06.9 RHEUMATOID ARTHRITIS INVOLVING MULTIPLE SITES, UNSPECIFIED RHEUMATOID FACTOR PRESENCE: ICD-10-CM

## 2017-09-13 DIAGNOSIS — E66.01 MORBID OBESITY WITH BMI OF 60.0-69.9, ADULT (H): ICD-10-CM

## 2017-09-13 DIAGNOSIS — R53.81 PHYSICAL DECONDITIONING: Primary | ICD-10-CM

## 2017-09-13 DIAGNOSIS — M25.569 KNEE PAIN, UNSPECIFIED CHRONICITY, UNSPECIFIED LATERALITY: ICD-10-CM

## 2017-09-13 NOTE — MR AVS SNAPSHOT
MRN:[id number]                      After Visit Summary   9/13/2017    Shira Rodriguez    MRN: [id number]           Visit Information        Provider Department      9/13/2017 10:00 AM Blanka Kaur RD [facility]        Your next 10 appointments already scheduled     Sep 13, 2017 11:00 AM CDT   (Arrive by 10:45 AM)   New Bariatric Surgery Consult with Sabi Petersen PA-C   [facility] ([facility])    [address] [place] [address]   [phone]            Sep 20, 2017  3:00 PM CDT   New Patient Visit with Kyra Sanchez MD   [facility] ([facility])    [place] [facility] [address]  [address]  [address] [facility] [address]   [phone]            Oct 16, 2017  8:30 AM CDT   (Arrive by 8:15 AM)   Return Visit with Rosita Chandra MD   [facility] ([facility])    [address] [place] [address]   [phone]            Oct 16, 2017  9:35 AM CDT   (Arrive by 9:20 AM)   Return Visit with Anjel Busby MD   [facility] ([facility])    [address] [place] [address]   [phone]              Care Instructions      GOALS:  Relating To Eating:  Eat slowly (20-30 minutes per meal), chewing foods well (25 chews per bite/applesauce consistency).  Focus on lean protein and non-starchy vegetables/whole fruit at each meal with no more than 1 cup of carbs or 2 slices of bread.  Record food intake prior to eating throughout the day.  May use MyFitnessPal.com or your own notebook (record food and amounts).     Relating to beverages:  Separate fluids from meals by 30 minutes before, during, and after eating  Avoid calorie-containing beverages    Relating to dietary supplements:  Start a  multivitamin containing iron daily    Relating to activity:  Increase activity as able (PT, pool)    Relating to cravings:  Practice alternatives to emotion eating     See dietitian monthly.           [employer] games Information     [employer] gives you secure access to your electronic health record. If you see a primary care provider, you can also send messages to your care team and make appointments. If you have questions, please call your primary care clinic.  If you do not have a primary care provider, please call [phone] and they will assist you.      [employer] is an electronic gateway that provides easy, online access to your medical records. With [employer], you can request a clinic appointment, read your test results, renew a prescription or communicate with your care team.     To access your existing account, please contact your [facility] or call [phone] for assistance.        Care EveryWhere ID     This is your Care EveryWhere ID. This could be used by other organizations to access your [place] medical records  [id number]        Equal Access to Services     VARSHA FLOREZ AH: Hadii dionisio Wilkinson, waodalisda cristina, qagarfield kaalmaaditya lozano, andreas tello. So [facility] [phone].    ATENCIÓN: Si habla español, tiene a samano disposición servicios gratuitos de asistencia lingüística. Llame al [phone].    We comply with applicable federal civil rights laws and [place] laws. We do not discriminate on the basis of race, color, national origin, age, disability sex, sexual orientation or gender identity.

## 2017-09-13 NOTE — PATIENT INSTRUCTIONS
GOALS:  Relating To Eating:  Eat slowly (20-30 minutes per meal), chewing foods well (25 chews per bite/applesauce consistency).  Focus on lean protein and non-starchy vegetables/whole fruit at each meal with no more than 1 cup of carbs or 2 slices of bread.  Record food intake prior to eating throughout the day.  May use MyFitnessPal.com or your own notebook (record food and amounts).     Relating to beverages:  Separate fluids from meals by 30 minutes before, during, and after eating  Avoid calorie-containing beverages    Relating to dietary supplements:  Start a multivitamin containing iron daily    Relating to activity:  Increase activity as able (PT, pool)    Relating to cravings:  Practice alternatives to emotion eating     See dietitian monthly.

## 2017-09-13 NOTE — LETTER
"2017       RE: Shira Rodriguez  [address] [facility] [address]     Dear Colleague,    Thank you for referring your patient, Shira Rodriguez, to the [facility] SURGICAL WEIGHT MANAGEMENT at [facility]. Please see a copy of my visit note below.    New Bariatric Surgery Consultation Note    RE: Shira Rodriguez  MR#: [id number]  : 1960      Referring provider: Self referred    Chief Complaint/Reason for visit: evaluation for possible weight loss surgery    Dear Anjel Busby MD (General),    I had the pleasure of seeing your patient, Shira Rodriguez, to evaluate her obesity and consider her for possible weight loss surgery. As you know, Shira Rodriguez is 57 year old.  She has a height of 5' 6\", a weight of 389 lbs 12.8 oz, and calculated Body mass index is 62.92 kg/(m^2).    HISTORY OF PRESENT ILLNESS:  Weight Loss History Reviewed with Patient 2017   How long have you been overweight? Since puberty   What is the most that you have ever weighed? 405   What is the most weight you have lost? 25   I have tried the following methods to lose weight Watching portions or calories, Physician directed program   I have tried the following weight loss medications? (Check all that apply) None   Have you ever had weight loss surgery? No     Went to [facility] last summer and lost 25 lbs.    CO-MORBIDITIES OF OBESITY INCLUDE:     2017   I have the following co-morbidities associated with obesity: High Blood Pressure, Sleep Apnea, Lower Extremity Edema, Weight Bearing Joint Pain   Do you use a CPAP? No   Stopped using BIPAP because of dry coughing fits    PAST MEDICAL HISTORY:  Past Medical History:   Diagnosis Date     Chronic bronchitis (H) 2015     Contact dermatitis      Depressive disorder 1985     Eczema     HANDS     Elevated liver enzymes      H/O total knee replacement, left      History of total hip replacement 10/27/2011     Hyperlipidemia      " Hypertension      Morbid obesity (H)      Osteoarthrosis     right knee     Sleep apnea      Tobacco use disorder    Recent diagnosed with Rheumatoid Arthritis and start methotrexate    PAST SURGICAL HISTORY:  Past Surgical History:   Procedure Laterality Date     ARTHROPLASTY KNEE  6/14/2012    Procedure: ARTHROPLASTY KNEE;  Left Total Knee Arthroplasty;  Surgeon: Annette Quesada MD;  Location: [facility] OR     ARTHROSCOPY HIP Right      C TOTAL HIP ARTHROPLASTY  07/09/04    RT       FAMILY HISTORY:   Family History   Problem Relation Age of Onset     Thyroid Disease Mother      Hypertension Mother      Skin Cancer Mother      MMohs surgery with skin graft     CEREBROVASCULAR DISEASE Mother      CVA after endarderectomy     DIABETES Mother      Breast Cancer Paternal Grandmother      Pancreatic Cancer Paternal Grandmother      Cardiovascular Paternal Aunt      Cardiovascular Paternal Uncle      Depression Other      Alcoholism Father      Thyroid Disease Sister      Alcoholism Sister      Hypertension Maternal Grandmother      HEART DISEASE Sister      multiple ablations     Alcoholism Sister      Prostate Cancer Paternal Grandfather        SOCIAL HISTORY:   Social History Questions Reviewed With Patient 9/9/2017   Which best describes your employment status (select all that apply) I am unemployed   Which best describes your marital status: single   Do you have children? No   Who do you have in your support network that can be available to help you for the first 2 weeks after surgery? mother, sisters   Who can you count on for support throughout your weight loss surgery journey? mother, sisters   Can you afford 3 meals a day?  Yes   Can you afford 50-60 dollars a month for vitamins? 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  Recently out from work due to pain and recent RA diagnosis.  Live alone, close to sisters and mother who live close    HABITS:     9/9/2017   How often do you  drink alcohol? Monthly or less   If you do drink alcohol, how many drinks might you have in a day? (one drink = 5 oz. wine, 1 can/bottle of beer, 1 shot liquor) 1 or 2   Do you currently use any of the following Nicotine products? cigarettes   Have you ever used any of the following nicotine products? Cigarettes   Have you or are you currently using street drugs or prescription strength medication for which you do not have a prescription for? No   Do you have a history of chemical dependency (alcohol or drug abuse)? No   Smokes <1ppd, working on quitting    PSYCHOLOGICAL HISTORY:   Psychological History Reviewed With Patient 9/9/2017   Have you ever attempted suicide? Never.   Have you had thoughts of suicide in the past year? No   Have you ever been hospitalized for mental illness or a suicide attempt? Never.   Do you have a history of chronic pain? Yes   Have you ever been diagnosed with fibromyalgia? No   Are you currently being treated for any of the following? (select all that apply) Depression   Are you currently seeing a therapist or counselor?  No   Are you currently seeing a psychiatrist? Yes       ROS:     9/9/2017   Skin:  Skin fold rashes (groin or other folds), Leg swelling   HEENT: None of these   Musculoskeletal: Joint Pain, Back pain, Limited mobility, Swelling of legs, Arthritis   Cardiovascular: Shortness of breath with activity   Pulmonary: Shortness of breath with activity, Snoring, People have told me I stop breathing while asleep   Gastrointestinal: None of the above   Genitourinary: None of the above   Hematological: None of the above   Neurological: None of the above   Female only: Post-menopausal       EATING BEHAVIORS:     9/9/2017   Have you or anyone else thought that you had an eating disorder? Yes   If you answered yes to the previous eating disorder question, select the types that apply from this list: Binge Eating   Do you currently binge eat (eat a large amount of food in a short  "time)? Yes   Are you an emotional eater? Yes   Do you get up to eat after falling asleep? No       EXERCISE:     9/9/2017   How often do you exercise? Never   What keeps you from being more active?  Pain, My ability to walk or move around is limited, Shortness of breath, Too tired       MEDICATIONS:  Current Outpatient Prescriptions   Medication     methotrexate 2.5 MG tablet CHEMO     folic acid (FOLVITE) 1 MG tablet     predniSONE (DELTASONE) 10 MG tablet     predniSONE (DELTASONE) 10 MG tablet     HYDROcodone-acetaminophen (NORCO) 5-325 MG per tablet     Ibuprofen (ADVIL PO)     meloxicam (MOBIC) 15 MG tablet     order for DME     aspirin 81 MG tablet     lisinopril (PRINIVIL/ZESTRIL) 10 MG tablet     atorvastatin (LIPITOR) 20 MG tablet     SERTRALINE HCL PO     albuterol (PROAIR HFA/PROVENTIL HFA/VENTOLIN HFA) 108 (90 BASE) MCG/ACT Inhaler     fluocinonide (LIDEX) 0.05 % ointment     budesonide-formoterol (SYMBICORT) 160-4.5 MCG/ACT inhaler     No current facility-administered medications for this visit.        ALLERGIES:  Allergies   Allergen Reactions     Adhesive Tape Blisters     Erythromycin Rash       LABS/IMAGING/MEDICAL RECORDS REVIEW:     PHYSICAL EXAM:  /81 (BP Location: Other (Comment), Patient Position: Chair, Cuff Size: Adult Regular)  Pulse 96  Ht 5' 6\"  Wt (!) 389 lb 12.8 oz  SpO2 96%  BMI 62.92 kg/m2  General: NAD  Neurologic: A & O x 3, gait normal  Head: normocephalic, atraumatic  HEENT: PERRL, EOMI.   Respiratory: respirations unlabored  Abdomen: Obese, Soft NT ND   Extremities: No LE swelling   Skin: warm and dry.  No rashes on exposed skin  Psychiatric: Mentation and Affect appear normal    In summary, Shira Rodriguez has Class III obesity with a body mass index of Body mass index is 62.92 kg/(m^2). kg/m2 and the comorbidities stated above. She completed an informational seminar and is a candidate for the laparoscopic gastric sleeve.  She will have to complete the following " "pre-requisites:  See dietitian in 1 month  Make appt with Medical weight management New Consult with MD. Dr Dykes  See Dr Anderson in 1 month for [facility] meet and greet visit (new RA diagnosis)    Bariatric Task List  Status:  Is patient a candidate for bariatric surgery?:    -     Cleared to schedule surgeon consult?:    -     Status:  surgery evaluation in process -     Surgeon:   -  Dr Anderson   Tentative surgery month/year: TBD -        Patient Info: Initial Height:  5'6\" -     Initial Weight:  389lb 12.8oz -     Initial BMI:  62.92 -     Date of Initial Weight/Height:  9/13/2017 -     Goal Weight (lbs):  350 -     Required Weight Loss:  39 -     Surgery Type:  sleeve gastrectomy -        Insurance:  Insurance:  Yes -     Insurance Type:  Preferred One -        Patient Education:  Information Session:  Completed -     Given \"Making your decision\" handout?:  Yes -     Given support group information?:  Yes -     Attended support group?:  Yes -     Support plan in place?:  Completed -     Research consents signed?:  Yes -        PCP:  Establish care with PCP:  Completed -     PCP letter of support:  Needed -        Smoking:  Quit tobacco use (3 months smoke free)?:  Needed -        Psychological Evaluation:  Psych eval:  Needed -     Therapist letter of support:    -     Psychiatrist letter of support:  Needed -        Dietician Visits: Structured weight loss required?:  Yes -     Number of Visits:  3 -     Dietician Visit 1:  Completed -     Dietician Visit 2:  Needed -     Dietician Visit 3:  Needed -        Lab Work: Complete Blood Count:  Needed -     Comprehensive Metabolic Panel:  Needed -     Vitamin D:  Needed -     PTH:  Needed -        Consults:  Cardiac Consult:  Needed - will go to current cardiologist.   Rheumatology Consult:  Needed - recent diagnosis of RA   Sleep Medicine Testing/Consult:  Needed - will go to current sleep clinic   Medical Weight Management Consult: Needed -        Final Tasks:  " Before surgery online class:  Needed -     Before surgery online class website link:  [email or web address]   After surgery online class:  Needed -     After surgery online class website link:  [email or web address]   Nurse visit for weigh-in and information:  Needed -     Pre-assessment clinic visit with anesthesia team for H&P:  Needed -     Final labs (Hgb, plt, T&S, UA):  Needed -          Today in the office we discussed gastric sleeve surgery. Preoperative, perioperative, and postoperative processes, management, and follow up were addressed.  Risks and benefits were outlined including the risk of death, staple line leak (1-2%), PE, DVT, ulcer, worsening GERD, N/V, stricture, hernia, wound infection, weight regain, and vitamin deficiencies. I emphasized exercise and activity along with appropriate food choice as the main foundation for weight loss with surgery providing surgical reinforcement of this.  All questions were answered.  A goal sheet and support group handout were given to the patient.      Once the patient has completed the requirements in their task list and there are no further recommendations, the pt will be allowed to see the surgeon of her choice for consultation on the laparoscopic gastric sleeve surgery. Patient verbalizes understanding of the process to surgery and expectations for the postoperative period including the need for lifelong lifestyle changes, vitamin supplementation, and laboratory monitoring.       Sincerely,      Sabi Petersen PA-C    I spent a total of 40 minutes face to face with Shira during today's office visit. Over 50% of this time was spent counseling the patient and/or coordinating care.            Again, thank you for allowing me to participate in the care of your patient.      Sincerely,    Sabi Petersen PA-C

## 2017-09-13 NOTE — PATIENT INSTRUCTIONS
"See dietitian in 1 month  Make appt with Medical weight management New Consult with MD. Dr Dykes  See Dr Anderson in 1 month for [facility] meet and greet visit (new RA diagnosis)      Bariatric Task List  Status:  Is patient a candidate for bariatric surgery?:    -     Cleared to schedule surgeon consult?:    -     Status:  surgery evaluation in process -     Surgeon:   -  Dr Anderson   Tentative surgery month/year: TBD -        Patient Info: Initial Height:  5'6\" -     Initial Weight:  389lb 12.8oz -     Initial BMI:  62.92 -     Date of Initial Weight/Height:  9/13/2017 -     Goal Weight (lbs):  350 -     Required Weight Loss:  39 -     Surgery Type:  sleeve gastrectomy -        Insurance:  Insurance:  Yes -     Insurance Type:  [employer] One -        Patient Education:  Information Session:  Completed -     Given \"Making your decision\" handout?:  Yes -     Given support group information?:  Yes -     Attended support group?:  Yes -     Support plan in place?:  Completed -     Research consents signed?:  Yes -        PCP:  Establish care with PCP:  Completed -     PCP letter of support:  Needed -        Smoking:  Quit tobacco use (3 months smoke free)?:  Needed -        Psychological Evaluation:  Psych eval:  Needed -        Psychiatrist letter of support:  Needed -        Dietician Visits: Structured weight loss required?:  Yes -     Number of Visits:  3 -     Dietician Visit 1:  Completed -     Dietician Visit 2:  Needed -     Dietician Visit 3:  Needed -        Lab Work: Complete Blood Count:  Needed -     Comprehensive Metabolic Panel:  Needed -     Vitamin D:  Needed -     PTH:  Needed -        Consults:  Cardiac Consult:  Needed - will go to current cardiologist.   Rheumatology Consult:  Needed - recent diagnosis of RA   Sleep Medicine Testing/Consult:  Needed - will go to current sleep clinic   Medical Weight Management Consult: Needed -        Final Tasks:  Before surgery online class:  Needed -     Before " surgery online class website link:  [email or web address]   After surgery online class:  Needed -     After surgery online class website link:  [email or web address]   Nurse visit for weigh-in and information:  Needed -     Pre-assessment clinic visit with anesthesia team for H&P:  Needed -     Final labs (Hgb, plt, T&S, UA):  Needed -

## 2017-09-13 NOTE — PROGRESS NOTES
"New Bariatric Surgery Consultation Note    RE: Shira Rodriguez  MR#: [id number]  : 1960      Referring provider: Self referred    Chief Complaint/Reason for visit: evaluation for possible weight loss surgery    Dear Anjel Busby MD (General),    I had the pleasure of seeing your patient, Shira Rodriguez, to evaluate her obesity and consider her for possible weight loss surgery. As you know, Shira Rodriguez is 57 year old.  She has a height of 5' 6\", a weight of 389 lbs 12.8 oz, and calculated Body mass index is 62.92 kg/(m^2).    HISTORY OF PRESENT ILLNESS:  Weight Loss History Reviewed with Patient 2017   How long have you been overweight? Since puberty   What is the most that you have ever weighed? 405   What is the most weight you have lost? 25   I have tried the following methods to lose weight Watching portions or calories, Physician directed program   I have tried the following weight loss medications? (Check all that apply) None   Have you ever had weight loss surgery? No     Went to [facility] last summer and lost 25 lbs.    CO-MORBIDITIES OF OBESITY INCLUDE:     2017   I have the following co-morbidities associated with obesity: High Blood Pressure, Sleep Apnea, Lower Extremity Edema, Weight Bearing Joint Pain   Do you use a CPAP? No   Stopped using BIPAP because of dry coughing fits    PAST MEDICAL HISTORY:  Past Medical History:   Diagnosis Date     Chronic bronchitis (H) 2015     Contact dermatitis      Depressive disorder 1985     Eczema     HANDS     Elevated liver enzymes      H/O total knee replacement, left      History of total hip replacement 10/27/2011     Hyperlipidemia      Hypertension      Morbid obesity (H)      Osteoarthrosis     right knee     Sleep apnea      Tobacco use disorder    Recent diagnosed with Rheumatoid Arthritis and start methotrexate    PAST SURGICAL HISTORY:  Past Surgical History:   Procedure Laterality Date     ARTHROPLASTY KNEE  2012 "    Procedure: ARTHROPLASTY KNEE;  Left Total Knee Arthroplasty;  Surgeon: Annette Quesada MD;  Location: [facility]     ARTHROSCOPY HIP Right      C TOTAL HIP ARTHROPLASTY  07/09/04    RT       FAMILY HISTORY:   Family History   Problem Relation Age of Onset     Thyroid Disease Mother      Hypertension Mother      Skin Cancer Mother      MMohs surgery with skin graft     CEREBROVASCULAR DISEASE Mother      CVA after endarderectomy     DIABETES Mother      Breast Cancer Paternal Grandmother      Pancreatic Cancer Paternal Grandmother      Cardiovascular Paternal Aunt      Cardiovascular Paternal Uncle      Depression Other      Alcoholism Father      Thyroid Disease Sister      Alcoholism Sister      Hypertension Maternal Grandmother      HEART DISEASE Sister      multiple ablations     Alcoholism Sister      Prostate Cancer Paternal Grandfather        SOCIAL HISTORY:   Social History Questions Reviewed With Patient 9/9/2017   Which best describes your employment status (select all that apply) I am unemployed   Which best describes your marital status: single   Do you have children? No   Who do you have in your support network that can be available to help you for the first 2 weeks after surgery? mother, sisters   Who can you count on for support throughout your weight loss surgery journey? mother, sisters   Can you afford 3 meals a day?  Yes   Can you afford 50-60 dollars a month for vitamins? 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  Recently out from work due to pain and recent RA diagnosis.  Live alone, close to sisters and mother who live close    HABITS:     9/9/2017   How often do you drink alcohol? Monthly or less   If you do drink alcohol, how many drinks might you have in a day? (one drink = 5 oz. wine, 1 can/bottle of beer, 1 shot liquor) 1 or 2   Do you currently use any of the following Nicotine products? cigarettes   Have you ever used any of the following nicotine  products? Cigarettes   Have you or are you currently using street drugs or prescription strength medication for which you do not have a prescription for? No   Do you have a history of chemical dependency (alcohol or drug abuse)? No   Smokes <1ppd, working on quitting    PSYCHOLOGICAL HISTORY:   Psychological History Reviewed With Patient 9/9/2017   Have you ever attempted suicide? Never.   Have you had thoughts of suicide in the past year? No   Have you ever been hospitalized for mental illness or a suicide attempt? Never.   Do you have a history of chronic pain? Yes   Have you ever been diagnosed with fibromyalgia? No   Are you currently being treated for any of the following? (select all that apply) Depression   Are you currently seeing a therapist or counselor?  No   Are you currently seeing a psychiatrist? Yes       ROS:     9/9/2017   Skin:  Skin fold rashes (groin or other folds), Leg swelling   HEENT: None of these   Musculoskeletal: Joint Pain, Back pain, Limited mobility, Swelling of legs, Arthritis   Cardiovascular: Shortness of breath with activity   Pulmonary: Shortness of breath with activity, Snoring, People have told me I stop breathing while asleep   Gastrointestinal: None of the above   Genitourinary: None of the above   Hematological: None of the above   Neurological: None of the above   Female only: Post-menopausal       EATING BEHAVIORS:     9/9/2017   Have you or anyone else thought that you had an eating disorder? Yes   If you answered yes to the previous eating disorder question, select the types that apply from this list: Binge Eating   Do you currently binge eat (eat a large amount of food in a short time)? Yes   Are you an emotional eater? Yes   Do you get up to eat after falling asleep? No       EXERCISE:     9/9/2017   How often do you exercise? Never   What keeps you from being more active?  Pain, My ability to walk or move around is limited, Shortness of breath, Too tired  "      MEDICATIONS:  Current Outpatient Prescriptions   Medication     methotrexate 2.5 MG tablet CHEMO     folic acid (FOLVITE) 1 MG tablet     predniSONE (DELTASONE) 10 MG tablet     predniSONE (DELTASONE) 10 MG tablet     HYDROcodone-acetaminophen (NORCO) 5-325 MG per tablet     Ibuprofen (ADVIL PO)     meloxicam (MOBIC) 15 MG tablet     order for DME     aspirin 81 MG tablet     lisinopril (PRINIVIL/ZESTRIL) 10 MG tablet     atorvastatin (LIPITOR) 20 MG tablet     SERTRALINE HCL PO     albuterol (PROAIR HFA/PROVENTIL HFA/VENTOLIN HFA) 108 (90 BASE) MCG/ACT Inhaler     fluocinonide (LIDEX) 0.05 % ointment     budesonide-formoterol (SYMBICORT) 160-4.5 MCG/ACT inhaler     No current facility-administered medications for this visit.        ALLERGIES:  Allergies   Allergen Reactions     Adhesive Tape Blisters     Erythromycin Rash       LABS/IMAGING/MEDICAL RECORDS REVIEW:     PHYSICAL EXAM:  /81 (BP Location: Other (Comment), Patient Position: Chair, Cuff Size: Adult Regular)  Pulse 96  Ht 5' 6\"  Wt (!) 389 lb 12.8 oz  SpO2 96%  BMI 62.92 kg/m2  General: NAD  Neurologic: A & O x 3, gait normal  Head: normocephalic, atraumatic  HEENT: PERRL, EOMI.   Respiratory: respirations unlabored  Abdomen: Obese, Soft NT ND   Extremities: No LE swelling   Skin: warm and dry.  No rashes on exposed skin  Psychiatric: Mentation and Affect appear normal    In summary, Shira Rodriguez has Class III obesity with a body mass index of Body mass index is 62.92 kg/(m^2). kg/m2 and the comorbidities stated above. She completed an informational seminar and is a candidate for the laparoscopic gastric sleeve.  She will have to complete the following pre-requisites:  See dietitian in 1 month  Make appt with Medical weight management New Consult with MD. Dr Dykes  See Dr Anderson in 1 month for PBS meet and greet visit (new RA diagnosis)    Bariatric Task List  Status:  Is patient a candidate for bariatric surgery?:    -   " "  Cleared to schedule surgeon consult?:    -     Status:  surgery evaluation in process -     Surgeon:   -  Dr Anderson   Tentative surgery month/year: TBD -        Patient Info: Initial Height:  5'6\" -     Initial Weight:  389lb 12.8oz -     Initial BMI:  62.92 -     Date of Initial Weight/Height:  9/13/2017 -     Goal Weight (lbs):  350 -     Required Weight Loss:  39 -     Surgery Type:  sleeve gastrectomy -        Insurance:  Insurance:  Yes -     Insurance Type:  Preferred One -        Patient Education:  Information Session:  Completed -     Given \"Making your decision\" handout?:  Yes -     Given support group information?:  Yes -     Attended support group?:  Yes -     Support plan in place?:  Completed -     Research consents signed?:  Yes -        PCP:  Establish care with PCP:  Completed -     PCP letter of support:  Needed -        Smoking:  Quit tobacco use (3 months smoke free)?:  Needed -        Psychological Evaluation:  Psych eval:  Needed -     Therapist letter of support:    -     Psychiatrist letter of support:  Needed -        Dietician Visits: Structured weight loss required?:  Yes -     Number of Visits:  3 -     Dietician Visit 1:  Completed -     Dietician Visit 2:  Needed -     Dietician Visit 3:  Needed -        Lab Work: Complete Blood Count:  Needed -     Comprehensive Metabolic Panel:  Needed -     Vitamin D:  Needed -     PTH:  Needed -        Consults:  Cardiac Consult:  Needed - will go to current cardiologist.   Rheumatology Consult:  Needed - recent diagnosis of RA   Sleep Medicine Testing/Consult:  Needed - will go to current sleep clinic   Medical Weight Management Consult: Needed -        Final Tasks:  Before surgery online class:  Needed -     Before surgery online class website link:  [email or web address]   After surgery online class:  Needed -     After surgery online class website link:  [email or web address]   Nurse visit for weigh-in and " information:  Needed -     Pre-assessment clinic visit with anesthesia team for H&P:  Needed -     Final labs (Hgb, plt, T&S, UA):  Needed -          Today in the office we discussed gastric sleeve surgery. Preoperative, perioperative, and postoperative processes, management, and follow up were addressed.  Risks and benefits were outlined including the risk of death, staple line leak (1-2%), PE, DVT, ulcer, worsening GERD, N/V, stricture, hernia, wound infection, weight regain, and vitamin deficiencies. I emphasized exercise and activity along with appropriate food choice as the main foundation for weight loss with surgery providing surgical reinforcement of this.  All questions were answered.  A goal sheet and support group handout were given to the patient.      Once the patient has completed the requirements in their task list and there are no further recommendations, the pt will be allowed to see the surgeon of her choice for consultation on the laparoscopic gastric sleeve surgery. Patient verbalizes understanding of the process to surgery and expectations for the postoperative period including the need for lifelong lifestyle changes, vitamin supplementation, and laboratory monitoring.       Sincerely,      Sabi Petersen PA-C    I spent a total of 40 minutes face to face with Shira during today's office visit. Over 50% of this time was spent counseling the patient and/or coordinating care.

## 2017-09-13 NOTE — MR AVS SNAPSHOT
"              After Visit Summary   9/13/2017    Shira Rodriguez    MRN: [id number]           Patient Information     Date Of Birth          1960        Visit Information        Provider Department      9/13/2017 11:00 AM Sabi Petersen PA-C [facility]        Today's Diagnoses     Physical deconditioning    -  1    Rheumatoid arthritis involving multiple sites, unspecified rheumatoid factor presence (H)        Knee pain, unspecified chronicity, unspecified laterality          Care Instructions    See dietitian in 1 month  Make appt with Medical weight management New Consult with MD. Dr Dykes  See Dr Anderson in 1 month for PBS meet and greet visit (new RA diagnosis)      Bariatric Task List  Status:  Is patient a candidate for bariatric surgery?:    -     Cleared to schedule surgeon consult?:    -     Status:  surgery evaluation in process -     Surgeon:   -  Dr Anderson   Tentative surgery month/year: TBD -        Patient Info: Initial Height:  5'6\" -     Initial Weight:  389lb 12.8oz -     Initial BMI:  62.92 -     Date of Initial Weight/Height:  9/13/2017 -     Goal Weight (lbs):  350 -     Required Weight Loss:  39 -     Surgery Type:  sleeve gastrectomy -        Insurance:  Insurance:  Yes -     Insurance Type:  Preferred One -        Patient Education:  Information Session:  Completed -     Given \"Making your decision\" handout?:  Yes -     Given support group information?:  Yes -     Attended support group?:  Yes -     Support plan in place?:  Completed -     Research consents signed?:  Yes -        PCP:  Establish care with PCP:  Completed -     PCP letter of support:  Needed -        Smoking:  Quit tobacco use (3 months smoke free)?:  Needed -        Psychological Evaluation:  Psych eval:  Needed -     Therapist letter of support:    -     Psychiatrist letter of support:  Needed -        Dietician Visits: Structured weight loss required?:  Yes -     Number of " Visits:  3 -     Dietician Visit 1:  Completed -     Dietician Visit 2:  Needed -     Dietician Visit 3:  Needed -        Lab Work: Complete Blood Count:  Needed -     Comprehensive Metabolic Panel:  Needed -     Vitamin D:  Needed -     PTH:  Needed -        Consults:  Cardiac Consult:  Needed - will go to current cardiologist.   Rheumatology Consult:  Needed - recent diagnosis of RA   Sleep Medicine Testing/Consult:  Needed - will go to current sleep clinic   Medical Weight Management Consult: Needed -        Final Tasks:  Before surgery online class:  Needed -     Before surgery online class website link:  [email or web address]   After surgery online class:  Needed -     After surgery online class website link:  [email or web address]   Nurse visit for weigh-in and information:  Needed -     Pre-assessment clinic visit with anesthesia team for H&P:  Needed -     Final labs (Hgb, plt, T&S, UA):  Needed -                    Follow-ups after your visit        Additional Services     [facility] PT, HAND, AND CHIROPRACTIC REFERRAL       **This order will print in the [facility] Scheduling Office**    Physical Therapy, Hand Therapy and Chiropractic Care are available through:    *[facility]  *[facility]  *[facility]    Call one number to schedule at any of the above locations: [phone].    Your provider has referred you to: Physical Therapy at [facility] or [facility]    Indication/Reason for Referral: Knee Pain, rheumatoid arthritis, morbid obesity, deconditioning  Onset of Illness:   Therapy Orders: Evaluate and Treat  Special Programs: None  Special Request: None    Dane Vela      Additional Comments for the Therapist or Chiropractor: GET MOVING PROGRAM    Please be aware that coverage of these services is subject to the terms and limitations of your health insurance plan.  Call member services at your health plan with any benefit or coverage  questions.      Please bring the following to your appointment:    *Your personal calendar for scheduling future appointments  *Comfortable clothing                  Your next 10 appointments already scheduled     Sep 20, 2017  3:00 PM CDT   New Patient Visit with Kyra Sanchez MD   [facility] ([facility])    [facility] [facility] [address]  [address]  [facility] [address]   [phone]            Oct 16, 2017  8:30 AM CDT   (Arrive by 8:15 AM)   Return Visit with Rosita Chandra MD   [facility] ([facility])    [address]  3rd Floor  [facility] [id number] [phone]            Oct 16, 2017  9:35 AM CDT   (Arrive by 9:20 AM)   Return Visit with Anjel Busby MD   [facility] ([facility])    [address]  4th Floor  [facility] [id number] [phone]              Who to contact     Please call your clinic at [phone] to:    Ask questions about your health    Make or cancel appointments    Discuss your medicines    Learn about your test results    Speak to your doctor   If you have compliments or concerns about an experience at your clinic, or if you wish to file a complaint, please contact [facility] Patient Relations at [phone] or email us at [email or web address]         Additional Information About Your Visit        [employer] Information     [employer] gives you secure access to your electronic health record. If you see a primary care provider, you can also send messages to your care team and make appointments. If you have questions, please call your primary care clinic.  If you do not have a primary care provider, please call [phone] and they will assist you.      [employer] is an electronic gateway that provides easy, online access to your medical records. With [employer], you can request  "a clinic appointment, read your test results, renew a prescription or communicate with your care team.     To access your existing account, please contact your [facility] or call [phone] for assistance.        Care EveryWhere ID     This is your Care EveryWhere ID. This could be used by other organizations to access your [facility] medical records  [id number]        Your Vitals Were     Pulse Height Pulse Oximetry BMI (Body Mass Index)          96 5' 6\" 96% 62.92 kg/m2         Blood Pressure from Last 3 Encounters:   09/13/17 126/81   09/08/17 146/89   08/28/17 157/88    Weight from Last 3 Encounters:   09/13/17 (!) 389 lb 12.8 oz   09/08/17 (!) 389 lb   08/28/17 (!) 390 lb 11.2 oz              We Performed the Following     [facility] PT, HAND, AND CHIROPRACTIC REFERRAL          Today's Medication Changes          These changes are accurate as of: 9/13/17 11:45 AM.  If you have any questions, ask your nurse or doctor.               These medicines have changed or have updated prescriptions.        Dose/Directions    atorvastatin 20 MG tablet   Commonly known as:  LIPITOR   This may have changed:  when to take this        Dose:  20 mg   Take 1 tablet (20 mg) by mouth daily   Quantity:  90 tablet   Refills:  3       * predniSONE 10 MG tablet   Commonly known as:  DELTASONE   This may have changed:    - how much to take  - how to take this  - when to take this  - additional instructions   Used for:  Flare of rheumatoid arthritis (H)   Changed by:  Rosita Chandra MD        Take 30 mg (3 tablets) daily x 3 days, then 20 mg (2 tablets) daily for 3 days   Quantity:  15 tablet   Refills:  0       * predniSONE 10 MG tablet   Commonly known as:  DELTASONE   This may have changed:  Another medication with the same name was changed. Make sure you understand how and when to take each.   Used for:  Rheumatoid arthritis involving multiple sites with positive rheumatoid factor (H), High risk " medication use   Changed by:  Rosita Chandra MD        Take 30 mg qd x5d, 20 mg x5d, 10 mg x5d, 5 mg daily until see rheumatology next   Quantity:  100 tablet   Refills:  1       * Notice:  This list has 2 medication(s) that are the same as other medications prescribed for you. Read the directions carefully, and ask your doctor or other care provider to review them with you.             Primary Care Provider Office Phone # Fax #    Anjel Busby [phone]       [address] [address]        Equal Access to Services     [facility]: Hadii aad ku hadasho Soomaali, waaxda luqadaha, qaybta kaalmada adeegyada, andreas herring . So [facility] [phone].    ATENCIÓN: Si habla español, tiene a samano disposición servicios gratuitos de asistencia lingüística. [facility] [phone].    We comply with applicable federal civil rights laws and [place] laws. We do not discriminate on the basis of race, color, national origin, age, disability sex, sexual orientation or gender identity.            Thank you!     Thank you for choosing [facility]  for your care. Our goal is always to provide you with excellent care. Hearing back from our patients is one way we can continue to improve our services. Please take a few minutes to complete the written survey that you may receive in the mail after your visit with us. Thank you!             Your Updated Medication List - Protect others around you: Learn how to safely use, store and throw away your medicines at www.disposemymeds.org.          This list is accurate as of: 9/13/17 11:45 AM.  Always use your most recent med list.                   Brand Name Dispense Instructions for use Diagnosis    ADVIL PO      Take 400 mg by mouth every 4 hours as needed        albuterol 108 (90 BASE) MCG/ACT Inhaler    PROAIR HFA/PROVENTIL HFA/VENTOLIN HFA    1 Inhaler    Inhale 2 puffs into the lungs  every 6 hours as needed for shortness of breath / dyspnea or wheezing    SANTOS (dyspnea on exertion)       aspirin 81 MG tablet      Take 81 mg by mouth every morning        atorvastatin 20 MG tablet    LIPITOR    90 tablet    Take 1 tablet (20 mg) by mouth daily        budesonide-formoterol 160-4.5 MCG/ACT Inhaler    SYMBICORT    1 Inhaler    Inhale 2 puffs into the lungs 2 times daily    Cough       fluocinonide 0.05 % ointment    LIDEX    60 g    Apply topically daily To hands and feet when dermatitis active    Dermatitis       folic acid 1 MG tablet    FOLVITE    90 tablet    Take 1 tablet (1 mg) by mouth daily    High risk medication use, Rheumatoid arthritis involving multiple sites with positive rheumatoid factor (H)       HYDROcodone-acetaminophen 5-325 MG per tablet    NORCO    40 tablet    Take 1-2 tabs daily as needed for pain.    Chronic pain of right knee       lisinopril 10 MG tablet    PRINIVIL/ZESTRIL    90 tablet    Take 1 tablet (10 mg) by mouth daily    Essential hypertension       meloxicam 15 MG tablet    MOBIC    30 tablet    Take 1 tablet (15 mg) by mouth daily    Bilateral carpal tunnel syndrome       methotrexate 2.5 MG tablet CHEMO     24 tablet    Take 6 tablets (15 mg) by mouth once a week Take 6 tablets (15mg) once weekly.    Rheumatoid arthritis involving multiple sites with positive rheumatoid factor (H)       order for DME      Equipment being ordered: BIPAP 19/13 CM H20 AIRCURVE 10 AIRFIT F20 # [id number]   DN# [id number]        * predniSONE 10 MG tablet    DELTASONE    15 tablet    Take 30 mg (3 tablets) daily x 3 days, then 20 mg (2 tablets) daily for 3 days    Flare of rheumatoid arthritis (H)       * predniSONE 10 MG tablet    DELTASONE    100 tablet    Take 30 mg qd x5d, 20 mg x5d, 10 mg x5d, 5 mg daily until see rheumatology next    Rheumatoid arthritis involving multiple sites with positive rheumatoid factor (H), High risk medication use       SERTRALINE HCL PO      Take 150 mg by  mouth every morning        * Notice:  This list has 2 medication(s) that are the same as other medications prescribed for you. Read the directions carefully, and ask your doctor or other care provider to review them with you.

## 2017-09-13 NOTE — PROGRESS NOTES
"New Bariatric Nutrition Consultation Note    Reason For Visit: Nutrition Assessment    Shira Rodriguez is a 57 year-old presenting today for new bariatric nutrition consult.  Pt is interested in laparoscopic sleeve gastrectomy (Dr. Anderson).  This is pt's first of 3 required nutrition visits prior to surgery. Pt referred by Sabi MELENDEZ) on 9/13/17.     She is interested in having weight loss surgery for the following reasons:  To improve overall health and wellbeing (Rheumatoid Arthritis).    Support System Reviewed With Patient 9/9/2017   Who do you have in your support network that can be available to help you for the first 2 weeks after surgery? mother, sisters   Who can you count on for support throughout your weight loss surgery journey? mother, sisters     ANTHROPOMETRICS:    Height as of 8/28/17: 1.676 m (5' 6\").    Weight as of 9/8/17: 176.4 kg (389 lb) with BMI of 62.79.    Required weight loss goal pre-op: -39 lbs from initial consult weight (goal weight 350 lbs or less before surgery)       9/9/2017   I have tried the following methods to lose weight Watching portions or calories, Physician directed program   *Pt went to the [facility] for weight management (started it at 405 lbs).   *Medical Weight Management, RD counseling, [facility]  *Pt did not feel that Dr. Rocky FOY and she \"clicked well\" in medical weight management.      Weight Loss Questions Reviewed With Patient 9/9/2017   How long have you been overweight? Since puberty     SUPPLEMENT INFORMATION:  None  *Pt has been on steriods for a few weeks for RA, which doesn't help her constant hunger.  She will remain on this at least through October.     NUTRITION HISTORY:  *Pt does not care to cook, but recently started using the [employer] grill and using salad mixes for the past 2 weeks per Pt's report.  *Pt has been trying to quit smoking and tends to use Tootsie Pops in their place.      Recall Diet Questions Reviewed With " Patient 9/9/2017   Describe what you typically consume for breakfast (typical or most recent): english muffin; Cheerios with 1% milk and blueberries   Describe what you typically consume for lunch (typical or most recent): lean pockets and nectarines   Describe what you typically consume for supper (typical or most recent): fast food, latelly trying to prepare things   Describe what you typically consume as snacks (typical or most recent): varies, chips or cookies; sometimes dessert - pie   How many ounces of water, or other low calorie drinks, do you drink daily (8 oz=1 glass)? 48 oz   How many ounces of caffeine (coffee, tea, pop) do you drink daily (8 oz=1 glass)? 24 oz   How many ounces of carbonated (pop, beer, sparkling water) drinks do you drinky daily (8 oz=1 glass)? 16 oz   How often do you drink alcohol? Monthly or less   If you do drink alcohol, how many drinks might you have in a day? (one drink = 5 oz. wine, 1 can/bottle of beer, 1 shot liquor) 1 or 2   *Pt is willing to avoid alcohol after surgery.     Eating Habits 9/9/2017   Do you have any dietary restrictions? No   Do you currently binge eat (eat a large amount of food in a short time)? Yes   Are you an emotional eater? Yes   Do you get up to eat after falling asleep? No   What foods do you crave? -     Dining Out History Reviewed With Patient 9/9/2017   How often do you dine out? A couple of times a week.   Where do you dine out? (select all that apply) fast food chains, take out   What types of food do you order when you dine out? burgers, chinese       Physical Activity Reviewed With Patient 9/9/2017   How often do you exercise? Never   What keeps you from being more active?  Pain, My ability to walk or move around is limited, Shortness of breath, Too tired   *Pt is interested in PT and using the pool.     NUTRITION DIAGNOSIS:  Obesity r/t long history of self-monitoring deficit and excessive energy intake aeb BMI  >30.    INTERVENTION:  Intervention Provided/Education Provided on post-op diet guidelines, vitamins/minerals essential post-operatively, GI anatomy of bariatric surgeries, ways to help prepare for post-op diet guidelines pre-operatively, portion/calorie-control, mindful eating and exercise.  Provided pt with list of goals RD contact information.      Questions Reviewed With Patient 9/9/2017   How ready are you to make changes regarding your weight? Number 1 = Not ready at all to make changes up to 10 = very ready. 9   How confident are you that you can change? 1 = Not confident that you will be successful making changes up to 10 = very confident. 7     Patient Understanding: good  Expected Compliance: good    GOALS:  Relating To Eating:  Eat slowly (20-30 minutes per meal), chewing foods well (25 chews per bite/applesauce consistency).  Focus on lean protein and non-starchy vegetables/whole fruit at each meal with no more than 1 cup of carbs or 2 slices of bread.  Record food intake prior to eating throughout the day.  May use [email or web address].com or your own notebook (record food and amounts).     Relating to beverages:  Separate fluids from meals by 30 minutes before, during, and after eating  Avoid calorie-containing beverages    Relating to dietary supplements:  Start a multivitamin containing iron daily    Relating to activity:  Increase activity as able (PT, pool)    Relating to cravings:  Practice alternatives to emotion eating     Follow-Up: 1 month    Time spent with patient: 30 minutes.  Blanka Kaur, MS, RDN, LDN, CLT  Pager: [phone]

## 2017-09-14 ASSESSMENT — ENCOUNTER SYMPTOMS
NERVOUS/ANXIOUS: 0
MUSCLE WEAKNESS: 0
DECREASED APPETITE: 0
DEPRESSION: 1
NIGHT SWEATS: 0
BRUISES/BLEEDS EASILY: 1
LOSS OF CONSCIOUSNESS: 0
HEADACHES: 0
POSTURAL DYSPNEA: 0
CHILLS: 0
DECREASED CONCENTRATION: 0
TREMORS: 0
SHORTNESS OF BREATH: 1
ARTHRALGIAS: 1
WHEEZING: 0
SPEECH CHANGE: 0
HEMOPTYSIS: 0
NECK PAIN: 1
COUGH DISTURBING SLEEP: 1
COUGH: 1
FEVER: 0
POLYPHAGIA: 0
WEAKNESS: 0
RESPIRATORY PAIN: 0
INSOMNIA: 1
DECREASED LIBIDO: 0
SWOLLEN GLANDS: 0
SEIZURES: 0
DIZZINESS: 0
ALTERED TEMPERATURE REGULATION: 0
PARALYSIS: 0
HOT FLASHES: 0
FATIGUE: 1
DYSPNEA ON EXERTION: 1
NUMBNESS: 1
TINGLING: 1
DISTURBANCES IN COORDINATION: 0
SPUTUM PRODUCTION: 1
WEIGHT LOSS: 0
PANIC: 0
WEIGHT GAIN: 0
HALLUCINATIONS: 0
MEMORY LOSS: 0
BACK PAIN: 1
MUSCLE CRAMPS: 0
INCREASED ENERGY: 0
SNORES LOUDLY: 1
JOINT SWELLING: 1
STIFFNESS: 1
MYALGIAS: 1
POLYDIPSIA: 0

## 2017-09-15 ENCOUNTER — OFFICE VISIT (OUTPATIENT)
Dept: ENDOCRINOLOGY | Facility: CLINIC | Age: 57
End: 2017-09-15

## 2017-09-15 VITALS
SYSTOLIC BLOOD PRESSURE: 116 MMHG | WEIGHT: 293 LBS | TEMPERATURE: 98.7 F | DIASTOLIC BLOOD PRESSURE: 72 MMHG | HEART RATE: 101 BPM | BODY MASS INDEX: 47.09 KG/M2 | OXYGEN SATURATION: 95 % | HEIGHT: 66 IN

## 2017-09-15 DIAGNOSIS — E66.01 MORBID OBESITY DUE TO EXCESS CALORIES (H): Primary | ICD-10-CM

## 2017-09-15 RX ORDER — TOPIRAMATE 25 MG/1
TABLET, FILM COATED ORAL
Qty: 90 TABLET | Refills: 2 | Status: SHIPPED | OUTPATIENT
Start: 2017-09-15 | End: 2018-03-05

## 2017-09-15 ASSESSMENT — PAIN SCALES - GENERAL: PAINLEVEL: NO PAIN (0)

## 2017-09-15 NOTE — LETTER
"9/15/2017       RE: Shira Rodriguez   [address] [facility] [address]     Dear Colleague,    Thank you for referring your patient, Shira Rodriguez, to the [facility] MEDICAL WEIGHT MANAGEMENT at [facility]. Please see a copy of my visit note below.          New Medical Weight Management Consult    PATIENT:  Shira Rodriguez  MRN:         [id number]  :         1960  FERNANDO:         9/15/2017    Dear Anjel Busby,    I had the pleasure of seeing your patient, Shira Rodriguez.  Full intake/assessment done to determine barriers to weight loss success and develop a treatment plan.  Shira Rodriguez is a 57 year old female interested in treatment of medical problems associated with weight.  Her weight today is 389 lbs 8 oz, Body mass index is 62.87 kg/(m^2)., and she has the following co-morbidities:     2017   I have the following co-morbidities associated with obesity: High Blood Pressure, High Cholesterol, Sleep Apnea, Lower Extremity Edema, Weight Bearing Joint Pain   Do you use a CPAP? -   Oral steroids--started last Dec () and currently on; has started methotrexate about 3 wks ago and is so far doing ok on that.    Currently tapering oral pred--on 5 mg now and will be on that until Oct.  Recently with the flare--foot pain and swelling and hands also.    Has \"lost the same 20# about 3-4 times past few yrs\"  Lost job a couple of yrs ago--notes stress with this  Went through [facility] last yr--intensive outpt (4 days per wk)--support group sessions and this worked well for her  Did [employer] for a yr--lost about 20-30# but then stopped doing it and regained    Saw Sabi Petersen on Monday (discussion then regarding possible sleeve gastrectomy)--thinking about wt loss surgery and was referred to med wt mgmt  Was seen in med wt mgmt before, a few yrs ago also (--[facility]--from visit in 10/13 I note the following:  \"Shira Rodriguez continues to " "be seen for treatment of obesity related to lymphedema, mild Sleep Apnea (does not use) and Depression  Shira is depressed and has strong craving/reward pathways, a binge eating component.   At last visit 9/11/13, I have started her on topiramate 75 mg daily. She said that topiramate helps reducing appetite. She is able to control the portion. She is now eating [employer] all the meal. She does not snack. She fees great. She is very happy to know that she has lost 17 lbs over the past 5 weeks.\")    Tendency to not eat during the day and picking up fast food in the afternoons/evenings on the way home    Patient Goals Reviewed With Patient 9/14/2017   I am interested in attaining a healthier weight to diminish current health problems related to co-morbid conditions: Yes   I am interested in attaining a healthier weight in order to prevent future health problems: Yes       Referring Provider 9/14/2017   Please name the provider who referred you to Medical Weight Management.  If you do not know, please answer: \"I Don't Know\". Self referral       Wt Readings from Last 4 Encounters:   09/15/17 (!) 176.7 kg (389 lb 8 oz)   09/13/17 (!) 176.8 kg (389 lb 12.8 oz)   09/08/17 (!) 176.4 kg (389 lb)   08/28/17 (!) 177.2 kg (390 lb 11.2 oz)       Weight History Reviewed With Patient 9/14/2017   How concerned are you about your weight? Very Concerned   Would you describe your weight gain as gradual? Yes   I became overweight: As a Teenager   The following factors have contributed to my weight gain:  Eating Wrong Types of Food, Eating Too Much, Lack of Exercise   I have tried the following methods to lose weight: Watching Portions or Calories, Slim Fast or Other Liquid Diets   I have the following family history of obesity/being overweight:  I am the only one in my immediate family who is overweight   Has anyone in your family had weight loss surgery? No       Diet Recall Reviewed With Patient 9/14/2017   How many glasses of " juice do you drink in a typical day? 0   How many of glasses of milk do you drink in a typical day? 0   How many 8oz glasses of sugar containing drinks such as Gonzalez-Aid/sweet tea do you drink in a day? 0   How many cans/bottles of sugar pop/soda/tea/sports drinks do you drink in a day? 0   How many cans/bottles of diet pop/soda/tea or sports drink do you drink in a day? 4   How often do you have a drink of alcohol? Monthly or Less   If you do drink, how many drinks might you have in a day? 1 or 2       Eating Habits Reviewed With Patient 9/14/2017   Generally, my meals include foods like these: bread, pasta, rice, potatoes, corn, crackers, sweet dessert, pop, or juice. Almost Everyday   Generally, my meals include foods like these: fried meats, brats, burgers, french fries, pizza, cheese, chips, or ice cream. Half of the Week   Eat fast food (like [employer], [employer], [employer]). Half of the Week   Eat at a buffet or sit-down restaurant. Never   Eat most of my meals in front of the TV or computer. Everyday   Often skip meals, eat at random times, have no regular eating times. Almost Everyday   Rarely sit down for a meal but snack or graze throughout.  Never   Eat extra snacks between meals. Almost Everyday   Eat most of my food at the end of the day. Half of the Week   Eat in the middle of the night or wake up at night to eat. Never   Eat extra snacks to prevent or correct low blood sugar. Never   Eat to prevent acid reflux or stomach pain. Never   Worry about not having enough food to eat. Never   Have you been to the food shelf at least a few times this year? No   I eat when I am depressed, stressed, anxious, or bored. Everyday   I eat when I am happy or as a reward. Half of the Week   I feel hungry all the time even if I just have eaten. Half of the Week   Feeling full is important to me. Almost Everyday   Once I start eating, it is hard to stop. Almost Everyday   I finish all the food on my plate even if I am  already full. Almost Everyday   I can't resist eating delicious food or walk past the good food/smell. Almost Everyday   I eat/snack without noticing that I am eating. Everyday   I eat when I am preparing the meal. Never   I eat more than usual when I see others eating. Never   I have trouble not eating sweets, ice cream, cookies, or chips if they are around the house. Everyday   I think about food all day. Almost Everyday   What foods, if any, do you crave? Sweets/Candy/Chocolate   I feel out of control when eating. Weekly   I eat a large amount of food, like a loaf of bread, a box of cookies, a pint/quart of ice cream, all at once. Weekly   I eat a large amount of food even when I am not hungry. Weekly   I eat rapidly. Almost Everyday   I eat alone because I feel embarrassed and do not want others to see how much I have eaten. Never   I eat until I am uncomfortably full. Never   I feel bad, disgusted, or guilty after I overeat. Almost Everyday   I make myself vomit what I have eaten or use laxatives to get rid of food. Never       Activity/Exercise History Reviewed With Patient 9/14/2017   How much of a typical 12 hour day do you spend sitting? Most of the Day   How much of a typical 12 hour day do you spend lying down? Less Than Half the Day   How much of a typical day do you spend walking/standing? Less Than Half the Day   How many times a week are you active for the purpose of exercise? Never   How many total minutes do you spend doing some activity for the purpose of exercising when you exercise? None   What keeps you from being more active? Pain, Shortness of Breath, Too tired       ROS    PAST MEDICAL HISTORY:  Past Medical History:   Diagnosis Date     Chronic bronchitis (H) 07/30/2015     Contact dermatitis      Depressive disorder 1985     Eczema     HANDS     Elevated liver enzymes      H/O total knee replacement, left      History of total hip replacement 10/27/2011     Hyperlipidemia      Hypertension       Morbid obesity (H)      Osteoarthrosis     right knee     Sleep apnea      Tobacco use disorder        Work/Social History Reviewed With Patient 9/14/2017   My employment status is: Unemployed   What is your marital status? Single   If in a relationship, is your significant other overweight? N/A   Do you have children? No   If you have children, are they overweight? N/A       Mental Health History Reviewed With Patient 9/14/2017   Have you ever been physically or sexually abused? No   How often in the past 2 weeks have you felt little interest or pleasure in doing things? Nearly Everyday   Over the past 2 weeks how often have you felt down, depressed, or hopeless? Nearly Everyday       Sleep History Reviewed With Patient 9/14/2017   How many hours do you sleep at night? 5   Do you think that you snore loudly or has anybody ever heard you snore loudly (louder than talking or so loud it can be heard behind a shut door)? Yes   Has anyone seen or heard you stop breathing during your sleep? Yes   Do you often feel tired, fatigued, or sleepy during the day? Yes       MEDICATIONS:   Current Outpatient Prescriptions   Medication Sig Dispense Refill     methotrexate 2.5 MG tablet CHEMO Take 6 tablets (15 mg) by mouth once a week Take 6 tablets (15mg) once weekly. 24 tablet 2     folic acid (FOLVITE) 1 MG tablet Take 1 tablet (1 mg) by mouth daily 90 tablet 3     predniSONE (DELTASONE) 10 MG tablet Take 30 mg qd x5d, 20 mg x5d, 10 mg x5d, 5 mg daily until see rheumatology next 100 tablet 1     predniSONE (DELTASONE) 10 MG tablet Take 30 mg (3 tablets) daily x 3 days, then 20 mg (2 tablets) daily for 3 days (Patient taking differently: Take 20 mg by mouth 2 times daily Take 30 mg (3 tablets) daily x 3 days, then 20 mg (2 tablets) daily for 3 days) 15 tablet 0     HYDROcodone-acetaminophen (NORCO) 5-325 MG per tablet Take 1-2 tabs daily as needed for pain. 40 tablet 0     Ibuprofen (ADVIL PO) Take 400 mg by mouth every 4  "hours as needed        meloxicam (MOBIC) 15 MG tablet Take 1 tablet (15 mg) by mouth daily 30 tablet 1     order for DME Equipment being ordered: BIPAP  19/13 CM H20  AIRCURVE 10  AIRFIT F20  # [id number]   DN# [id number]       aspirin 81 MG tablet Take 81 mg by mouth every morning        lisinopril (PRINIVIL/ZESTRIL) 10 MG tablet Take 1 tablet (10 mg) by mouth daily 90 tablet 3     atorvastatin (LIPITOR) 20 MG tablet Take 1 tablet (20 mg) by mouth daily (Patient taking differently: Take 20 mg by mouth every evening ) 90 tablet 3     SERTRALINE HCL PO Take 150 mg by mouth every morning        albuterol (PROAIR HFA/PROVENTIL HFA/VENTOLIN HFA) 108 (90 BASE) MCG/ACT Inhaler Inhale 2 puffs into the lungs every 6 hours as needed for shortness of breath / dyspnea or wheezing 1 Inhaler 0     fluocinonide (LIDEX) 0.05 % ointment Apply topically daily To hands and feet when dermatitis active 60 g 2     budesonide-formoterol (SYMBICORT) 160-4.5 MCG/ACT inhaler Inhale 2 puffs into the lungs 2 times daily 1 Inhaler 1       ALLERGIES:   Allergies   Allergen Reactions     Adhesive Tape Blisters     Erythromycin Rash       PHYSICAL EXAM:  /72 (BP Location: Left arm, Patient Position: Chair, Cuff Size: Adult Large)  Pulse 101  Temp 98.7  F (37.1  C)  Ht 1.676 m (5' 6\")  Wt (!) 176.7 kg (389 lb 8 oz)  SpO2 95%  BMI 62.87 kg/m2   A & O x 3  HEENT: NCAT, mucous membranes moist  Respirations unlabored  Location of obesity: Mixed Obesity    ASSESSMENT:  Shira is a patient with early onset morbid obesity without significant element of familial/genetic influence and with current health consequences.     Her problem is complicated by strong craving/reward pathways, a binge eating component and mental health/psychopharmacological barriers      PLAN:    Eat breakfast daily  Decrease portion sizes  Decrease eating out  No meals in front of TV screen  Purge house of food triggers  No meal skipping  Volumetrics eating plan  Calorie/low " fat diet    Craving/Reward   Ancillary testing:  N/A.  Food Plan:  Volumetrics, High protein/low carbohydrate and Neutralization diet: patient to identify good, bad, and neutral foods and then to eat only the neutral ones.   Activity Plan:  As tolerated.  Supplementary:  N/A.   Medication:  The patient will begin medication in pursuit of improved medical status as influenced by body weight. She will start topiramate. Patient was made aware that topiramate is not approved for the treatment of obesity.  There is a mutual understanding of the goals and risks of this therapy. The patient is in agreement. She is educated on dosage regimen and possible side effects.      Additionally--  Please return in about 2 months    We will start topramate; could go with Saxenda in the future if/as needed also.    MEDICATION STARTED AT THIS APPOINTMENT  We are starting topiramate at bedtime.  Start one tab, 25 mg, for a week. Go up to 50 mg (2 tabs) for the next week. At the third week, take   3 tabs (75 mg).  Stay at 3 tabs until you are seen again.      RTC:    8 weeks.    TIME: about 40/45  min spent on evaluation, management, counseling, education, & motivational interviewing with greater than 50 % of the total time was spent on counseling and coordinating care    Sincerely,    Jas Dykes MD

## 2017-09-15 NOTE — PROGRESS NOTES
"      New Medical Weight Management Consult    PATIENT:  Shira Rodriguez  MRN:         [id number]  :         1960  FERNANDO:         9/15/2017    Dear Qi, Anjel FAYE,    I had the pleasure of seeing your patient, Shira Rodriguez.  Full intake/assessment done to determine barriers to weight loss success and develop a treatment plan.  Shira Rodriguez is a 57 year old female interested in treatment of medical problems associated with weight.  Her weight today is 389 lbs 8 oz, Body mass index is 62.87 kg/(m^2)., and she has the following co-morbidities:     2017   I have the following co-morbidities associated with obesity: High Blood Pressure, High Cholesterol, Sleep Apnea, Lower Extremity Edema, Weight Bearing Joint Pain   Do you use a CPAP? -   Oral steroids--started last Dec () and currently on; has started methotrexate about 3 wks ago and is so far doing ok on that.    Currently tapering oral pred--on 5 mg now and will be on that until Oct.  Recently with the flare--foot pain and swelling and hands also.    Has \"lost the same 20# about 3-4 times past few yrs\"  Lost job a couple of yrs ago--notes stress with this  Went through [facility] last yr--intensive outpt (4 days per wk)--support group sessions and this worked well for her  Did [facility] for a yr--lost about 20-30# but then stopped doing it and regained    Saw Sabi Petersen on Monday (discussion then regarding possible sleeve gastrectomy)--thinking about wt loss surgery and was referred to med wt mgmt  Was seen in med wt mgmt before, a few yrs ago also (--[facility]--from visit in 10/13 I note the following:  \"Shira Rodriguez continues to be seen for treatment of obesity related to lymphedema, mild Sleep Apnea (does not use) and Depression  Shira is depressed and has strong craving/reward pathways, a binge eating component.   At last visit 13, I have started her on topiramate 75 mg daily. She said that topiramate helps " "reducing appetite. She is able to control the portion. She is now eating [employer] all the meal. She does not snack. She fees great. She is very happy to know that she has lost 17 lbs over the past 5 weeks.\")    Tendency to not eat during the day and picking up fast food in the afternoons/evenings on the way home    Patient Goals Reviewed With Patient 9/14/2017   I am interested in attaining a healthier weight to diminish current health problems related to co-morbid conditions: Yes   I am interested in attaining a healthier weight in order to prevent future health problems: Yes       Referring Provider 9/14/2017   Please name the provider who referred you to Medical Weight Management.  If you do not know, please answer: \"I Don't Know\". Self referral       Wt Readings from Last 4 Encounters:   09/15/17 (!) 176.7 kg (389 lb 8 oz)   09/13/17 (!) 176.8 kg (389 lb 12.8 oz)   09/08/17 (!) 176.4 kg (389 lb)   08/28/17 (!) 177.2 kg (390 lb 11.2 oz)       Weight History Reviewed With Patient 9/14/2017   How concerned are you about your weight? Very Concerned   Would you describe your weight gain as gradual? Yes   I became overweight: As a Teenager   The following factors have contributed to my weight gain:  Eating Wrong Types of Food, Eating Too Much, Lack of Exercise   I have tried the following methods to lose weight: Watching Portions or Calories, Slim Fast or Other Liquid Diets   I have the following family history of obesity/being overweight:  I am the only one in my immediate family who is overweight   Has anyone in your family had weight loss surgery? No       Diet Recall Reviewed With Patient 9/14/2017   How many glasses of juice do you drink in a typical day? 0   How many of glasses of milk do you drink in a typical day? 0   How many 8oz glasses of sugar containing drinks such as Gonzalez-Aid/sweet tea do you drink in a day? 0   How many cans/bottles of sugar pop/soda/tea/sports drinks do you drink in a day? 0 "   How many cans/bottles of diet pop/soda/tea or sports drink do you drink in a day? 4   How often do you have a drink of alcohol? Monthly or Less   If you do drink, how many drinks might you have in a day? 1 or 2       Eating Habits Reviewed With Patient 9/14/2017   Generally, my meals include foods like these: bread, pasta, rice, potatoes, corn, crackers, sweet dessert, pop, or juice. Almost Everyday   Generally, my meals include foods like these: fried meats, brats, burgers, french fries, pizza, cheese, chips, or ice cream. Half of the Week   Eat fast food (like [employer], [employer], [employer]). Half of the Week   Eat at a buffet or sit-down restaurant. Never   Eat most of my meals in front of the TV or computer. Everyday   Often skip meals, eat at random times, have no regular eating times. Almost Everyday   Rarely sit down for a meal but snack or graze throughout.  Never   Eat extra snacks between meals. Almost Everyday   Eat most of my food at the end of the day. Half of the Week   Eat in the middle of the night or wake up at night to eat. Never   Eat extra snacks to prevent or correct low blood sugar. Never   Eat to prevent acid reflux or stomach pain. Never   Worry about not having enough food to eat. Never   Have you been to the food shelf at least a few times this year? No   I eat when I am depressed, stressed, anxious, or bored. Everyday   I eat when I am happy or as a reward. Half of the Week   I feel hungry all the time even if I just have eaten. Half of the Week   Feeling full is important to me. Almost Everyday   Once I start eating, it is hard to stop. Almost Everyday   I finish all the food on my plate even if I am already full. Almost Everyday   I can't resist eating delicious food or walk past the good food/smell. Almost Everyday   I eat/snack without noticing that I am eating. Everyday   I eat when I am preparing the meal. Never   I eat more than usual when I see others eating. Never   I have  trouble not eating sweets, ice cream, cookies, or chips if they are around the house. Everyday   I think about food all day. Almost Everyday   What foods, if any, do you crave? Sweets/Candy/Chocolate   I feel out of control when eating. Weekly   I eat a large amount of food, like a loaf of bread, a box of cookies, a pint/quart of ice cream, all at once. Weekly   I eat a large amount of food even when I am not hungry. Weekly   I eat rapidly. Almost Everyday   I eat alone because I feel embarrassed and do not want others to see how much I have eaten. Never   I eat until I am uncomfortably full. Never   I feel bad, disgusted, or guilty after I overeat. Almost Everyday   I make myself vomit what I have eaten or use laxatives to get rid of food. Never       Activity/Exercise History Reviewed With Patient 9/14/2017   How much of a typical 12 hour day do you spend sitting? Most of the Day   How much of a typical 12 hour day do you spend lying down? Less Than Half the Day   How much of a typical day do you spend walking/standing? Less Than Half the Day   How many times a week are you active for the purpose of exercise? Never   How many total minutes do you spend doing some activity for the purpose of exercising when you exercise? None   What keeps you from being more active? Pain, Shortness of Breath, Too tired       ROS    PAST MEDICAL HISTORY:  Past Medical History:   Diagnosis Date     Chronic bronchitis (H) 07/30/2015     Contact dermatitis      Depressive disorder 1985     Eczema     HANDS     Elevated liver enzymes      H/O total knee replacement, left      History of total hip replacement 10/27/2011     Hyperlipidemia      Hypertension      Morbid obesity (H)      Osteoarthrosis     right knee     Sleep apnea      Tobacco use disorder        Work/Social History Reviewed With Patient 9/14/2017   My employment status is: Unemployed   What is your marital status? Single   If in a relationship, is your significant other  overweight? N/A   Do you have children? No   If you have children, are they overweight? N/A       Mental Health History Reviewed With Patient 9/14/2017   Have you ever been physically or sexually abused? No   How often in the past 2 weeks have you felt little interest or pleasure in doing things? Nearly Everyday   Over the past 2 weeks how often have you felt down, depressed, or hopeless? Nearly Everyday       Sleep History Reviewed With Patient 9/14/2017   How many hours do you sleep at night? 5   Do you think that you snore loudly or has anybody ever heard you snore loudly (louder than talking or so loud it can be heard behind a shut door)? Yes   Has anyone seen or heard you stop breathing during your sleep? Yes   Do you often feel tired, fatigued, or sleepy during the day? Yes       MEDICATIONS:   Current Outpatient Prescriptions   Medication Sig Dispense Refill     methotrexate 2.5 MG tablet CHEMO Take 6 tablets (15 mg) by mouth once a week Take 6 tablets (15mg) once weekly. 24 tablet 2     folic acid (FOLVITE) 1 MG tablet Take 1 tablet (1 mg) by mouth daily 90 tablet 3     predniSONE (DELTASONE) 10 MG tablet Take 30 mg qd x5d, 20 mg x5d, 10 mg x5d, 5 mg daily until see rheumatology next 100 tablet 1     predniSONE (DELTASONE) 10 MG tablet Take 30 mg (3 tablets) daily x 3 days, then 20 mg (2 tablets) daily for 3 days (Patient taking differently: Take 20 mg by mouth 2 times daily Take 30 mg (3 tablets) daily x 3 days, then 20 mg (2 tablets) daily for 3 days) 15 tablet 0     HYDROcodone-acetaminophen (NORCO) 5-325 MG per tablet Take 1-2 tabs daily as needed for pain. 40 tablet 0     Ibuprofen (ADVIL PO) Take 400 mg by mouth every 4 hours as needed        meloxicam (MOBIC) 15 MG tablet Take 1 tablet (15 mg) by mouth daily 30 tablet 1     order for DME Equipment being ordered: BIPAP  19/13 CM H20  AIRCURVE 10  AIRFIT F20  # [id number]   DN# [id number]       aspirin 81 MG tablet Take 81 mg by mouth every morning         "lisinopril (PRINIVIL/ZESTRIL) 10 MG tablet Take 1 tablet (10 mg) by mouth daily 90 tablet 3     atorvastatin (LIPITOR) 20 MG tablet Take 1 tablet (20 mg) by mouth daily (Patient taking differently: Take 20 mg by mouth every evening ) 90 tablet 3     SERTRALINE HCL PO Take 150 mg by mouth every morning        albuterol (PROAIR HFA/PROVENTIL HFA/VENTOLIN HFA) 108 (90 BASE) MCG/ACT Inhaler Inhale 2 puffs into the lungs every 6 hours as needed for shortness of breath / dyspnea or wheezing 1 Inhaler 0     fluocinonide (LIDEX) 0.05 % ointment Apply topically daily To hands and feet when dermatitis active 60 g 2     budesonide-formoterol (SYMBICORT) 160-4.5 MCG/ACT inhaler Inhale 2 puffs into the lungs 2 times daily 1 Inhaler 1       ALLERGIES:   Allergies   Allergen Reactions     Adhesive Tape Blisters     Erythromycin Rash       PHYSICAL EXAM:  /72 (BP Location: Left arm, Patient Position: Chair, Cuff Size: Adult Large)  Pulse 101  Temp 98.7  F (37.1  C)  Ht 1.676 m (5' 6\")  Wt (!) 176.7 kg (389 lb 8 oz)  SpO2 95%  BMI 62.87 kg/m2   A & O x 3  HEENT: NCAT, mucous membranes moist  Respirations unlabored  Location of obesity: Mixed Obesity    ASSESSMENT:  Shira is a patient with early onset morbid obesity without significant element of familial/genetic influence and with current health consequences.     Her problem is complicated by strong craving/reward pathways, a binge eating component and mental health/psychopharmacological barriers      PLAN:    Eat breakfast daily  Decrease portion sizes  Decrease eating out  No meals in front of TV screen  Purge house of food triggers  No meal skipping  Volumetrics eating plan  Calorie/low fat diet    Craving/Reward   Ancillary testing:  N/A.  Food Plan:  Volumetrics, High protein/low carbohydrate and Neutralization diet: patient to identify good, bad, and neutral foods and then to eat only the neutral ones.   Activity Plan:  As tolerated.  Supplementary:  N/A. "   Medication:  The patient will begin medication in pursuit of improved medical status as influenced by body weight. She will start topiramate. Patient was made aware that topiramate is not approved for the treatment of obesity.  There is a mutual understanding of the goals and risks of this therapy. The patient is in agreement. She is educated on dosage regimen and possible side effects.      Additionally--  Please return in about 2 months    We will start topramate; could go with Saxenda in the future if/as needed also.    MEDICATION STARTED AT THIS APPOINTMENT  We are starting topiramate at bedtime.  Start one tab, 25 mg, for a week. Go up to 50 mg (2 tabs) for the next week. At the third week, take   3 tabs (75 mg).  Stay at 3 tabs until you are seen again.      RTC:    8 weeks.    TIME: about 40/45  min spent on evaluation, management, counseling, education, & motivational interviewing with greater than 50 % of the total time was spent on counseling and coordinating care    Sincerely,    Jas Dykes MD

## 2017-09-15 NOTE — MR AVS SNAPSHOT
After Visit Summary   9/15/2017    Shira Rodriguez    MRN: [id number]           Patient Information     Date Of Birth          1960        Visit Information        Provider Department      9/15/2017 12:00 PM Jas Dykes MD [facility]        Today's Diagnoses     Morbid obesity due to excess calories (H)    -  1      Care Instructions    Please return in about 2 months    We will start topramate; could go with Saxenda in the future if/as needed also      MEDICATION STARTED AT THIS APPOINTMENT  We are starting topiramate at bedtime.  Start one tab, 25 mg, for a week. Go up to 50 mg (2 tabs) for the next week. At the third week, take   3 tabs (75 mg).  Stay at 3 tabs until you are seen again. Call the nurse at [phone] if you have any questions or concerns. (Do not stop taking it if you don't think it's working. For some people it works even though they do not feel much different.)    Topiramate (Topamax) is a medication that is used most often to treat migraine headaches or for seizures. It has also been found to help with weight loss. Although it's not currently FDA approved for weight loss, it has been used safely for a number of years to help people who are carrying extra weight.     Just how topiramate helps with weight loss has not been exactly determined. However it seems to work on areas of the brain to quiet down signals related to eating.      Topiramate may make you:    >feel less interest in eating in between meals   >think less about food and eating   >find it easier to push the plate away   >find giving up pop easier    >have an easier time eating less    For some of our patients, the pills work right away. They feel and think quite differently about food. Other patients don't feel much of a change but find in fact they have lost weight! Like all weight loss medications, topiramate works best when you help it work.  This means:    1) Have less  tempting high calorie (fattening) food around the house or office    2) Have lower calorie food (fruits, vegetables,low fat meats and dairy) for snacks    3) Eat out only one time or less each week.   4) Eat your meals at a table with the TV or computer off.    Side-effects. Topiramate is generally well tolerated. The main side-effects we see are:   Tingling in hands,feet, or face (usually not very troublesome)   Mental confusion and word finding trouble (about 10% of patients have this.)     Feeling sleepy or a bit dopey- this goes away very soon after starting.    One of the dangers of topiramate is the possibility of birth defects--if you get pregnant when you are on it, there is the risk that your baby will be born with a cleft lip or palate.  If you are on topiramate and of child bearing age, you need to be on a reliable form of birth control or refrain from sexual intercourse.     Please refer to the pharmacy insert for more information on side-effects. Since many pharmacists are not familiar with the use of topiramate in weight loss, calling the clinic will get you the most accurate information on the use of this medication for weight loss.     In order to get refills of this or any medication we prescribe you must be seen in the medical weight mgmt clinic every 2-3 months. Please have your pharmacy fax a refill request to [phone].                                         Follow-ups after your visit        Follow-up notes from your care team     Return in about 2 months (around 11/15/2017) for Dr. Dykes.      Your next 10 appointments already scheduled     Sep 20, 2017  3:00 PM CDT   New Patient Visit with Kyra Sanchez MD   [facility] ([facility])    [facility] [address] [facility] [address]   [phone]            Oct 16, 2017  8:30 AM CDT   (Arrive by 8:15 AM)   Return Visit with MD BRITTANI Yen  [facility] ([facility])    [address]  [address] [facility] [address]   [phone]            Oct 16, 2017  9:35 AM CDT   (Arrive by 9:20 AM)   Return Visit with Anjel Busby MD   [facility] ([facility])    [address]  [address] [facility] [id number] [phone]            Oct 18, 2017  2:00 PM CDT   NUTRITION VISIT with Blanka Kaur RD   [facility] ([facility])    [address] [place] [id number] [phone]            Oct 18, 2017  2:30 PM CDT   (Arrive by 2:15 PM)   Pre Bariatric Surgery with Rodo Anderson MD   [facility] ([facility])    [address] [place] [id number] [phone]              Who to contact     Please call your clinic at [phone] to:    Ask questions about your health    Make or cancel appointments    Discuss your medicines    Learn about your test results    Speak to your doctor   If you have compliments or concerns about an experience at your clinic, or if you wish to file a complaint, please contact [facility] Patient Relations at [phone] or email us at [email or web address]         Additional Information About Your Visit        [employer] Information     [employer] gives you secure access to your electronic health record. If you see a primary care provider, you can also send messages to your care team and make appointments. If you have questions, please call your primary care clinic.  If you do not have a primary care provider, please call [phone] and they will assist you.      [employer] is an electronic gateway that provides easy, online access to your medical records. With [employer], you can request a clinic appointment, read your test results, renew a  "prescription or communicate with your care team.     To access your existing account, please contact your [facility] or call [phone] for assistance.        Care EveryWhere ID     This is your Care EveryWhere ID. This could be used by other organizations to access your [facility] medical records  [id number]        Your Vitals Were     Pulse Temperature Height Pulse Oximetry BMI (Body Mass Index)       101 98.7  F (37.1  C) 1.676 m (5' 6\") 95% 62.87 kg/m2        Blood Pressure from Last 3 Encounters:   09/15/17 116/72   09/13/17 126/81   09/08/17 146/89    Weight from Last 3 Encounters:   09/15/17 (!) 176.7 kg (389 lb 8 oz)   09/13/17 (!) 176.8 kg (389 lb 12.8 oz)   09/08/17 (!) 176.4 kg (389 lb)              Today, you had the following     No orders found for display         Today's Medication Changes          These changes are accurate as of: 9/15/17  1:23 PM.  If you have any questions, ask your nurse or doctor.               Start taking these medicines.        Dose/Directions    topiramate 25 MG tablet   Commonly known as:  TOPAMAX   Used for:  Morbid obesity due to excess calories (H)   Started by:  Jas Dykes MD        25mg at bedtime for week 1, 50mg at bedtime for 1 week, and 75mg at bedtime thereafter   Quantity:  90 tablet   Refills:  2         These medicines have changed or have updated prescriptions.        Dose/Directions    atorvastatin 20 MG tablet   Commonly known as:  LIPITOR   This may have changed:  when to take this        Dose:  20 mg   Take 1 tablet (20 mg) by mouth daily   Quantity:  90 tablet   Refills:  3       * predniSONE 10 MG tablet   Commonly known as:  DELTASONE   This may have changed:    - how much to take  - how to take this  - when to take this  - additional instructions   Used for:  Flare of rheumatoid arthritis (H)   Changed by:  Rosita Chandra MD        Take 30 mg (3 tablets) daily x 3 days, then 20 mg (2 tablets) daily " for 3 days   Quantity:  15 tablet   Refills:  0       * predniSONE 10 MG tablet   Commonly known as:  DELTASONE   This may have changed:  Another medication with the same name was changed. Make sure you understand how and when to take each.   Used for:  Rheumatoid arthritis involving multiple sites with positive rheumatoid factor (H), High risk medication use   Changed by:  Rosita Chandra MD        Take 30 mg qd x5d, 20 mg x5d, 10 mg x5d, 5 mg daily until see rheumatology next   Quantity:  100 tablet   Refills:  1       * Notice:  This list has 2 medication(s) that are the same as other medications prescribed for you. Read the directions carefully, and ask your doctor or other care provider to review them with you.         Where to get your medicines      These medications were sent to [id number] - [place], [place] - [address] [place] [address] [address] [facility] [address] [place] [address]    Hours:  24-hours Phone:  [phone]     topiramate 25 MG tablet                Primary Care Provider Office Phone # Fax #    Anjel Busby [phone]       [address] [address]        Equal Access to Services     VARSHA FLOREZ AH: Hadii dionisio sanderso Soalisha, waaxda luqadaha, qaybta kaalmada adeegyada, andreas tello. So [facility] [phone].    ATENCIÓN: Si habla español, tiene a samano disposición servicios gratuitos de asistencia lingüística. Jorge al [phone].    We comply with applicable federal civil rights laws and [place] laws. We do not discriminate on the basis of race, color, national origin, age, disability sex, sexual orientation or gender identity.            Thank you!     Thank you for choosing [facility]  for your care. Our goal is always to provide you with excellent care. Hearing back from our patients is one way we can continue to improve our services. Please take a  few minutes to complete the written survey that you may receive in the mail after your visit with us. Thank you!             Your Updated Medication List - Protect others around you: Learn how to safely use, store and throw away your medicines at www.disposemymeds.org.          This list is accurate as of: 9/15/17  1:23 PM.  Always use your most recent med list.                   Brand Name Dispense Instructions for use Diagnosis    ADVIL PO      Take 400 mg by mouth every 4 hours as needed        albuterol 108 (90 BASE) MCG/ACT Inhaler    PROAIR HFA/PROVENTIL HFA/VENTOLIN HFA    1 Inhaler    Inhale 2 puffs into the lungs every 6 hours as needed for shortness of breath / dyspnea or wheezing    SANTOS (dyspnea on exertion)       aspirin 81 MG tablet      Take 81 mg by mouth every morning        atorvastatin 20 MG tablet    LIPITOR    90 tablet    Take 1 tablet (20 mg) by mouth daily        budesonide-formoterol 160-4.5 MCG/ACT Inhaler    SYMBICORT    1 Inhaler    Inhale 2 puffs into the lungs 2 times daily    Cough       fluocinonide 0.05 % ointment    LIDEX    60 g    Apply topically daily To hands and feet when dermatitis active    Dermatitis       folic acid 1 MG tablet    FOLVITE    90 tablet    Take 1 tablet (1 mg) by mouth daily    High risk medication use, Rheumatoid arthritis involving multiple sites with positive rheumatoid factor (H)       HYDROcodone-acetaminophen 5-325 MG per tablet    NORCO    40 tablet    Take 1-2 tabs daily as needed for pain.    Chronic pain of right knee       lisinopril 10 MG tablet    PRINIVIL/ZESTRIL    90 tablet    Take 1 tablet (10 mg) by mouth daily    Essential hypertension       meloxicam 15 MG tablet    MOBIC    30 tablet    Take 1 tablet (15 mg) by mouth daily    Bilateral carpal tunnel syndrome       methotrexate 2.5 MG tablet CHEMO     24 tablet    Take 6 tablets (15 mg) by mouth once a week Take 6 tablets (15mg) once weekly.    Rheumatoid arthritis involving multiple sites  with positive rheumatoid factor (H)       order for DME      Equipment being ordered: BIPAP 19/13 CM H20 AIRCURVE 10 AIRFIT F20 # [id number]   DN# [id number]        * predniSONE 10 MG tablet    DELTASONE    15 tablet    Take 30 mg (3 tablets) daily x 3 days, then 20 mg (2 tablets) daily for 3 days    Flare of rheumatoid arthritis (H)       * predniSONE 10 MG tablet    DELTASONE    100 tablet    Take 30 mg qd x5d, 20 mg x5d, 10 mg x5d, 5 mg daily until see rheumatology next    Rheumatoid arthritis involving multiple sites with positive rheumatoid factor (H), High risk medication use       SERTRALINE HCL PO      Take 150 mg by mouth every morning        topiramate 25 MG tablet    TOPAMAX    90 tablet    25mg at bedtime for week 1, 50mg at bedtime for 1 week, and 75mg at bedtime thereafter    Morbid obesity due to excess calories (H)       * Notice:  This list has 2 medication(s) that are the same as other medications prescribed for you. Read the directions carefully, and ask your doctor or other care provider to review them with you.

## 2017-09-15 NOTE — NURSING NOTE
"Chief Complaint   Patient presents with     Consult     consult new  medical weight loss       Vitals:    09/15/17 1200   BP: 116/72   BP Location: Left arm   Patient Position: Chair   Cuff Size: Adult Large   Pulse: 101   Temp: 98.7  F (37.1  C)   SpO2: 95%   Weight: (!) 389 lb 8 oz   Height: 5' 6\"       Body mass index is 62.87 kg/(m^2).    Lizy Syed MA                          "

## 2017-09-15 NOTE — PATIENT INSTRUCTIONS
Please return in about 2 months    We will start topramate; could go with Saxenda in the future if/as needed also      MEDICATION STARTED AT THIS APPOINTMENT  We are starting topiramate at bedtime.  Start one tab, 25 mg, for a week. Go up to 50 mg (2 tabs) for the next week. At the third week, take   3 tabs (75 mg).  Stay at 3 tabs until you are seen again. Call the nurse at [phone] if you have any questions or concerns. (Do not stop taking it if you don't think it's working. For some people it works even though they do not feel much different.)    Topiramate (Topamax) is a medication that is used most often to treat migraine headaches or for seizures. It has also been found to help with weight loss. Although it's not currently FDA approved for weight loss, it has been used safely for a number of years to help people who are carrying extra weight.     Just how topiramate helps with weight loss has not been exactly determined. However it seems to work on areas of the brain to quiet down signals related to eating.      Topiramate may make you:    >feel less interest in eating in between meals   >think less about food and eating   >find it easier to push the plate away   >find giving up pop easier    >have an easier time eating less    For some of our patients, the pills work right away. They feel and think quite differently about food. Other patients don't feel much of a change but find in fact they have lost weight! Like all weight loss medications, topiramate works best when you help it work.  This means:    1) Have less tempting high calorie (fattening) food around the house or office    2) Have lower calorie food (fruits, vegetables,low fat meats and dairy) for snacks    3) Eat out only one time or less each week.   4) Eat your meals at a table with the TV or computer off.    Side-effects. Topiramate is generally well tolerated. The main side-effects we see are:   Tingling in hands,feet, or face (usually not  very troublesome)   Mental confusion and word finding trouble (about 10% of patients have this.)     Feeling sleepy or a bit dopey- this goes away very soon after starting.    One of the dangers of topiramate is the possibility of birth defects--if you get pregnant when you are on it, there is the risk that your baby will be born with a cleft lip or palate.  If you are on topiramate and of child bearing age, you need to be on a reliable form of birth control or refrain from sexual intercourse.     Please refer to the pharmacy insert for more information on side-effects. Since many pharmacists are not familiar with the use of topiramate in weight loss, calling the clinic will get you the most accurate information on the use of this medication for weight loss.     In order to get refills of this or any medication we prescribe you must be seen in the medical weight mgmt clinic every 2-3 months. Please have your pharmacy fax a refill request to [phone].

## 2017-09-16 ENCOUNTER — TELEPHONE (OUTPATIENT)
Dept: RHEUMATOLOGY | Facility: CLINIC | Age: 57
End: 2017-09-16

## 2017-09-16 ASSESSMENT — ENCOUNTER SYMPTOMS
MUSCLE WEAKNESS: 0
SYNCOPE: 0
MUSCLE CRAMPS: 0
TACHYCARDIA: 0
FEVER: 0
ARTHRALGIAS: 1
NECK PAIN: 1
DIZZINESS: 0
SLEEP DISTURBANCES DUE TO BREATHING: 0
MEMORY LOSS: 0
DISTURBANCES IN COORDINATION: 0
POLYDIPSIA: 0
JOINT SWELLING: 1
FATIGUE: 1
DECREASED APPETITE: 0
PANIC: 0
TREMORS: 0
HEADACHES: 1
CLAUDICATION: 0
NIGHT SWEATS: 0
EXERCISE INTOLERANCE: 1
ALTERED TEMPERATURE REGULATION: 0
LEG PAIN: 1
PARALYSIS: 0
DEPRESSION: 1
PALPITATIONS: 0
CHILLS: 0
DECREASED CONCENTRATION: 0
POLYPHAGIA: 0
INCREASED ENERGY: 1
TINGLING: 1
STIFFNESS: 1
WEAKNESS: 0
NUMBNESS: 1
LIGHT-HEADEDNESS: 0
SPEECH CHANGE: 0
WEIGHT LOSS: 0
LOSS OF CONSCIOUSNESS: 0
INSOMNIA: 1
SEIZURES: 0
LEG SWELLING: 1
HYPERTENSION: 0
WEIGHT GAIN: 0
MYALGIAS: 1
BACK PAIN: 1
ORTHOPNEA: 0
HYPOTENSION: 0
HALLUCINATIONS: 0

## 2017-09-16 NOTE — TELEPHONE ENCOUNTER
Paged this afternoon regarding Ms. Rdoriguez. She has hx of newly diagnosed seropositive rheumatoid arthritis, recently seen about 3 wks ago when started on MTX. Was given IM depomedrol 80 mg and prednisone burst/taper. Calling today w/ several days of progressive polyarticular flare, today to the point where unable to move wrists/hands. Is down to 5 mg daily of prednisone. Takes MTX on Mondays and tolerating well. Unsure if MTX just not yet kicking in, if it is not effective at current dose, if there are absorption problems, etc. She did get relief from IM injection. States her feet this week finally almost looked normal. No signs infection, afebrile. Asked her to come in for MTX labs tomorrow, and if normal, will increase her MTX to 10 tabs weekly (split dosing 5 in AM, 5 in PM) and again repeat labs in 4 wks. Will have her take prednisone 50 mg x3d, 40 mg x3d, 30 mg x3d, 20 mg x3d, 15 mg x3d, 10 mg x3d, 5 mg daily until see her next. If continues to have pain, may need to come in for repeat IM depomedrol injection. Low threshold to change MTX to subcu dosing if pt willing.     Will ask clinic RN Ashli Barth to call pt on Mon/Tues to ensure pt is having some relief from increased prednisone dosing.     Emma Chandra  Rheum fellow

## 2017-09-18 ENCOUNTER — OFFICE VISIT (OUTPATIENT)
Dept: GASTROENTEROLOGY | Facility: CLINIC | Age: 57
End: 2017-09-18

## 2017-09-18 ENCOUNTER — MYC MEDICAL ADVICE (OUTPATIENT)
Dept: RHEUMATOLOGY | Facility: CLINIC | Age: 57
End: 2017-09-18

## 2017-09-18 DIAGNOSIS — F33.1 MAJOR DEPRESSIVE DISORDER, RECURRENT EPISODE, MODERATE (H): Primary | ICD-10-CM

## 2017-09-18 DIAGNOSIS — E66.01 MORBID OBESITY, UNSPECIFIED OBESITY TYPE (H): Chronic | ICD-10-CM

## 2017-09-18 DIAGNOSIS — F54 PSYCHOLOGICAL FACTORS AFFECTING MEDICAL CONDITION: ICD-10-CM

## 2017-09-18 DIAGNOSIS — E55.9 VITAMIN D DEFICIENCY: Primary | ICD-10-CM

## 2017-09-18 DIAGNOSIS — E66.01 MORBID OBESITY WITH BMI OF 60.0-69.9, ADULT (H): ICD-10-CM

## 2017-09-18 DIAGNOSIS — M05.79 RHEUMATOID ARTHRITIS INVOLVING MULTIPLE SITES WITH POSITIVE RHEUMATOID FACTOR (H): ICD-10-CM

## 2017-09-18 DIAGNOSIS — Z79.899 HIGH RISK MEDICATION USE: ICD-10-CM

## 2017-09-18 LAB
ALBUMIN SERPL-MCNC: 2.9 G/DL (ref 3.4–5)
ALBUMIN UR-MCNC: NEGATIVE MG/DL
ALP SERPL-CCNC: 67 U/L (ref 40–150)
ALT SERPL W P-5'-P-CCNC: 28 U/L (ref 0–50)
ANION GAP SERPL CALCULATED.3IONS-SCNC: 7 MMOL/L (ref 3–14)
APPEARANCE UR: ABNORMAL
AST SERPL W P-5'-P-CCNC: 11 U/L (ref 0–45)
BACTERIA #/AREA URNS HPF: ABNORMAL /HPF
BASOPHILS # BLD AUTO: 0 10E9/L (ref 0–0.2)
BASOPHILS NFR BLD AUTO: 0.1 %
BILIRUB SERPL-MCNC: 0.4 MG/DL (ref 0.2–1.3)
BILIRUB UR QL STRIP: NEGATIVE
BUN SERPL-MCNC: 14 MG/DL (ref 7–30)
CALCIUM SERPL-MCNC: 9.2 MG/DL (ref 8.5–10.1)
CHLORIDE SERPL-SCNC: 103 MMOL/L (ref 94–109)
CO2 SERPL-SCNC: 28 MMOL/L (ref 20–32)
COLOR UR AUTO: YELLOW
CREAT SERPL-MCNC: 0.61 MG/DL (ref 0.52–1.04)
CRP SERPL-MCNC: 7.6 MG/L (ref 0–8)
DIFFERENTIAL METHOD BLD: ABNORMAL
EOSINOPHIL # BLD AUTO: 0 10E9/L (ref 0–0.7)
EOSINOPHIL NFR BLD AUTO: 0 %
ERYTHROCYTE [DISTWIDTH] IN BLOOD BY AUTOMATED COUNT: 14.6 % (ref 10–15)
ERYTHROCYTE [SEDIMENTATION RATE] IN BLOOD BY WESTERGREN METHOD: 14 MM/H (ref 0–30)
GFR SERPL CREATININE-BSD FRML MDRD: >90 ML/MIN/1.7M2
GLUCOSE SERPL-MCNC: 132 MG/DL (ref 70–99)
GLUCOSE UR STRIP-MCNC: NEGATIVE MG/DL
HBA1C MFR BLD: 5.8 % (ref 4.3–6)
HCT VFR BLD AUTO: 49.1 % (ref 35–47)
HGB BLD-MCNC: 15.8 G/DL (ref 11.7–15.7)
HGB UR QL STRIP: NEGATIVE
IMM GRANULOCYTES # BLD: 0.1 10E9/L (ref 0–0.4)
IMM GRANULOCYTES NFR BLD: 0.7 %
KETONES UR STRIP-MCNC: NEGATIVE MG/DL
LEUKOCYTE ESTERASE UR QL STRIP: NEGATIVE
LYMPHOCYTES # BLD AUTO: 0.8 10E9/L (ref 0.8–5.3)
LYMPHOCYTES NFR BLD AUTO: 9.4 %
MCH RBC QN AUTO: 28.9 PG (ref 26.5–33)
MCHC RBC AUTO-ENTMCNC: 32.2 G/DL (ref 31.5–36.5)
MCV RBC AUTO: 90 FL (ref 78–100)
MONOCYTES # BLD AUTO: 0.4 10E9/L (ref 0–1.3)
MONOCYTES NFR BLD AUTO: 4.3 %
MUCOUS THREADS #/AREA URNS LPF: PRESENT /LPF
NEUTROPHILS # BLD AUTO: 7.4 10E9/L (ref 1.6–8.3)
NEUTROPHILS NFR BLD AUTO: 85.5 %
NITRATE UR QL: NEGATIVE
NRBC # BLD AUTO: 0 10*3/UL
NRBC BLD AUTO-RTO: 0 /100
PH UR STRIP: 5 PH (ref 5–7)
PLATELET # BLD AUTO: 260 10E9/L (ref 150–450)
POTASSIUM SERPL-SCNC: 4.3 MMOL/L (ref 3.4–5.3)
PROT SERPL-MCNC: 7.1 G/DL (ref 6.8–8.8)
PTH-INTACT SERPL-MCNC: 59 PG/ML (ref 12–72)
RBC # BLD AUTO: 5.47 10E12/L (ref 3.8–5.2)
RBC #/AREA URNS AUTO: 1 /HPF (ref 0–2)
SODIUM SERPL-SCNC: 138 MMOL/L (ref 133–144)
SOURCE: ABNORMAL
SP GR UR STRIP: 1.02 (ref 1–1.03)
SQUAMOUS #/AREA URNS AUTO: 2 /HPF (ref 0–1)
UROBILINOGEN UR STRIP-MCNC: 0 MG/DL (ref 0–2)
WBC # BLD AUTO: 8.7 10E9/L (ref 4–11)
WBC #/AREA URNS AUTO: 1 /HPF (ref 0–2)

## 2017-09-18 NOTE — MR AVS SNAPSHOT
After Visit Summary   9/18/2017    Shira Rodriguez    MRN: [id number]           Patient Information     Date Of Birth          1960        Visit Information        Provider Department      9/18/2017 3:00 PM Mindi Lopez, PhD [facility]        Today's Diagnoses     Major depressive disorder, recurrent episode, moderate (H)    -  1    Psychological factors affecting medical condition        Morbid obesity, unspecified obesity type (H)           Follow-ups after your visit        Your next 10 appointments already scheduled     Sep 20, 2017  3:00 PM CDT   New Patient Visit with Kyra Sanchez MD   [facility] ([facility])    [facility]  [address]  [address]  [facility] [address]   [phone]            Oct 16, 2017  8:30 AM CDT   (Arrive by 8:15 AM)   Return Visit with Rosita Chandra MD   [facility] Rheumatology ([facility])    [address]  [address] [facility] [address]   [phone]            Oct 16, 2017  9:35 AM CDT   (Arrive by 9:20 AM)   Return Visit with Anjel Busby MD   [facility] ([facility])    [address]  [address] [facility] [id number] [phone]            Oct 18, 2017  2:00 PM CDT   NUTRITION VISIT with Blanka Kaur RD   [facility] Surgical Weight Management ([facility])    [address]  [address] [facility] [id number] [phone]            Oct 18, 2017  2:30 PM CDT   (Arrive by 2:15 PM)   Pre Bariatric Surgery with Rodo Anderson MD   [facility] Surgical Weight Management ([facility])    [address]  [address] [facility] [address]   [phone]            Dec 15, 2017 10:40 AM CST   (Arrive by 10:25 AM)   Return Visit with Jas Dykes MD   [facility]  Weight Management ([facility])    [address]  [address]th [facility] [address] [phone]              Who to contact     Please call your clinic at [phone] to:    Ask questions about your health    Make or cancel appointments    Discuss your medicines    Learn about your test results    Speak to your doctor   If you have compliments or concerns about an experience at your clinic, or if you wish to file a complaint, please contact [facility] Patient Relations at [phone] or email us at [email or web address]         Additional Information About Your Visit        [employer] Information     [employer] gives you secure access to your electronic health record. If you see a primary care provider, you can also send messages to your care team and make appointments. If you have questions, please call your primary care clinic.  If you do not have a primary care provider, please call [phone] and they will assist you.      [employer] is an electronic gateway that provides easy, online access to your medical records. With [employer], you can request a clinic appointment, read your test results, renew a prescription or communicate with your care team.     To access your existing account, please contact your [facility] or call [phone] for assistance.        Care EveryWhere ID     This is your Care EveryWhere ID. This could be used by other organizations to access your [place] medical records  [id number]         Blood Pressure from Last 3 Encounters:   09/15/17 116/72   09/13/17 126/81   09/08/17 146/89    Weight from Last 3 Encounters:   09/15/17 (!) 176.7 kg (389 lb 8 oz)   09/13/17 (!) 176.8 kg (389 lb 12.8 oz)   09/08/17 (!) 176.4 kg (389 lb)              Today, you had the following     No orders found for display         Today's Medication Changes          These changes are accurate as of: 9/18/17 11:59 PM.   If you have any questions, ask your nurse or doctor.               These medicines have changed or have updated prescriptions.        Dose/Directions    atorvastatin 20 MG tablet   Commonly known as:  LIPITOR   This may have changed:  when to take this        Dose:  20 mg   Take 1 tablet (20 mg) by mouth daily   Quantity:  90 tablet   Refills:  3       methotrexate 2.5 MG tablet CHEMO   This may have changed:    - how much to take  - additional instructions   Used for:  Rheumatoid arthritis involving multiple sites with positive rheumatoid factor (H)   Changed by:  Rosita Chandra MD        Dose:  25 mg   Take 10 tablets (25 mg) by mouth once a week Take 5 in AM, 5 in PM once weekly. Labs in 4 wks.   Quantity:  40 tablet   Refills:  2       * predniSONE 10 MG tablet   Commonly known as:  DELTASONE   This may have changed:    - how much to take  - how to take this  - when to take this  - additional instructions   Used for:  Flare of rheumatoid arthritis (H)   Changed by:  Rosita Chandra MD        Take 30 mg (3 tablets) daily x 3 days, then 20 mg (2 tablets) daily for 3 days   Quantity:  15 tablet   Refills:  0       * predniSONE 10 MG tablet   Commonly known as:  DELTASONE   This may have changed:  Another medication with the same name was changed. Make sure you understand how and when to take each.   Used for:  Rheumatoid arthritis involving multiple sites with positive rheumatoid factor (H), High risk medication use   Changed by:  Rosita Chandra MD        Take 30 mg qd x5d, 20 mg x5d, 10 mg x5d, 5 mg daily until see rheumatology next   Quantity:  100 tablet   Refills:  1       * Notice:  This list has 2 medication(s) that are the same as other medications prescribed for you. Read the directions carefully, and ask your doctor or other care provider to review them with you.         Where to get your medicines      These medications were sent to [id number] - [place], [place] -  [address]  [address], [place] [address]    Hours:  24-hours Phone:  [phone]     methotrexate 2.5 MG tablet CHEMO                Primary Care Provider Office Phone # Fax #    Anjel Busby [phone]       [address] [address]        Equal Access to Services     VARSHA FLOREZ : Hadii aad ku hadasho Soomaali, waaxda luqadaha, qaybta kaalmada adeegyada, waxay idiin hayaan adeeg durandenluis antonio laregina . So [facility] [phone].    ATENCIÓN: Si habla español, tiene a samano disposición servicios gratuitos de asistencia lingüística. Jorge al [phone].    We comply with applicable federal civil rights laws and [place] laws. We do not discriminate on the basis of race, color, national origin, age, disability sex, sexual orientation or gender identity.            Thank you!     Thank you for choosing [facility]  for your care. Our goal is always to provide you with excellent care. Hearing back from our patients is one way we can continue to improve our services. Please take a few minutes to complete the written survey that you may receive in the mail after your visit with us. Thank you!             Your Updated Medication List - Protect others around you: Learn how to safely use, store and throw away your medicines at www.disposemymeds.org.          This list is accurate as of: 9/18/17 11:59 PM.  Always use your most recent med list.                   Brand Name Dispense Instructions for use Diagnosis    ADVIL PO      Take 400 mg by mouth every 4 hours as needed        albuterol 108 (90 BASE) MCG/ACT Inhaler    PROAIR HFA/PROVENTIL HFA/VENTOLIN HFA    1 Inhaler    Inhale 2 puffs into the lungs every 6 hours as needed for shortness of breath / dyspnea or wheezing    SANTOS (dyspnea on exertion)       aspirin 81 MG tablet      Take 81 mg by mouth every morning        atorvastatin 20 MG tablet    LIPITOR     90 tablet    Take 1 tablet (20 mg) by mouth daily        budesonide-formoterol 160-4.5 MCG/ACT Inhaler    SYMBICORT    1 Inhaler    Inhale 2 puffs into the lungs 2 times daily    Cough       fluocinonide 0.05 % ointment    LIDEX    60 g    Apply topically daily To hands and feet when dermatitis active    Dermatitis       folic acid 1 MG tablet    FOLVITE    90 tablet    Take 1 tablet (1 mg) by mouth daily    High risk medication use, Rheumatoid arthritis involving multiple sites with positive rheumatoid factor (H)       HYDROcodone-acetaminophen 5-325 MG per tablet    NORCO    40 tablet    Take 1-2 tabs daily as needed for pain.    Chronic pain of right knee       lisinopril 10 MG tablet    PRINIVIL/ZESTRIL    90 tablet    Take 1 tablet (10 mg) by mouth daily    Essential hypertension       meloxicam 15 MG tablet    MOBIC    30 tablet    Take 1 tablet (15 mg) by mouth daily    Bilateral carpal tunnel syndrome       methotrexate 2.5 MG tablet CHEMO     40 tablet    Take 10 tablets (25 mg) by mouth once a week Take 5 in AM, 5 in PM once weekly. Labs in 4 wks.    Rheumatoid arthritis involving multiple sites with positive rheumatoid factor (H)       order for DME      Equipment being ordered: BIPAP 19/13 CM H20 AIRCURVE 10 AIRFIT F20 SN# [id number]   DN# [id number]        * predniSONE 10 MG tablet    DELTASONE    15 tablet    Take 30 mg (3 tablets) daily x 3 days, then 20 mg (2 tablets) daily for 3 days    Flare of rheumatoid arthritis (H)       * predniSONE 10 MG tablet    DELTASONE    100 tablet    Take 30 mg qd x5d, 20 mg x5d, 10 mg x5d, 5 mg daily until see rheumatology next    Rheumatoid arthritis involving multiple sites with positive rheumatoid factor (H), High risk medication use       SERTRALINE HCL PO      Take 150 mg by mouth every morning        topiramate 25 MG tablet    TOPAMAX    90 tablet    25mg at bedtime for week 1, 50mg at bedtime for 1 week, and 75mg at bedtime thereafter    Morbid obesity due to  excess calories (H)       * Notice:  This list has 2 medication(s) that are the same as other medications prescribed for you. Read the directions carefully, and ask your doctor or other care provider to review them with you.

## 2017-09-18 NOTE — LETTER
9/18/2017       RE: Shira Rodriguez  [address] [facility] [address]     Dear Colleague,    Thank you for referring your patient, Shira Rodriguez, to the [facility]STROENTEROLOGY [facility] IBD CLINIC at [facility]. Please see a copy of my visit note below.      Health Psychology                  Clinic    Department of Medicine  Mary Rosa, PhD, LP [phone]                          Clinics and Surgery Center  [facility], PhD, LP [phone]                  3rd Floor  [place] Mail Code 741   Tad Abdi, PhD, ABPP, LP [phone]     [address],   [address] [facility],  Mindi Lopez,  PhD, LP [phone]            [place]  [place] [address] Anjali Rios, PhD, LP [phone]     Confidential Summary of Standard Psychodiagnostic Evaluation*    REFERRAL:  ZEKE Sanchez.        REASON FOR REFERRAL:  Coping with health change.      SOURCES OF INFORMATION:  Patient was seen for a psychodiagnostic evaluation.  Information was obtained from review of the medical records, administration of psychological assessments, and the clinical interview with the patient.      HISTORY OF PRESENT ILLNESS:  Shira Rodriguez is a 57-year-old female with morbid obesity with recent diagnosis of rheumatoid arthritis (RA) who presented seeking support in the context of recent diagnosis of chronic illness.  Current symptoms were reported to be difficulty with worrying, particularly about finances due to job insecurity.  She also endorsed anhedonia, depression, trouble sleeping, fatigue, and feeling bad about herself.  Specifically, she reports having a very supportive family through her mother and sisters, but feels as if she is a burden to them at times.  She also endorsed feeling guilty with difficulty adhering to behavioral interventions for weight loss and smoking cessation.  She endorsed feeling discouraged after  "diagnosis of RA, despite being relieved to have a diagnosis because it is a chronic condition that cannot be \"cured\".  She reportedly endorsed the following coping strategies:  Watching TV, playing games, and distracting herself with activities around the home.  Regarding history of weight loss, she stated she has gained and lost the same \"20-25 pounds\" several times in her life.  Of most success, she lost approximately 40 pounds through the [facility] program in 2012.  However, she looked back at that as a \"failure\" because she did not continue this despite the positive impact of that.  She recently initiated evaluation in the weight loss surgery program and indicated even if she plans to pursue medical weight management to lose weight versus surgical interventions, she feels as if it is an appropriate step to move forward.      PAST MEDICAL HISTORY:  See below list for current medical problems, past surgical history, and current medications.     Past Medical History:   Diagnosis Date     Chronic bronchitis (H) 07/30/2015     Contact dermatitis      Depressive disorder 1985     Eczema     HANDS     Elevated liver enzymes      H/O total knee replacement, left      History of total hip replacement 10/27/2011     Hyperlipidemia      Hypertension      Morbid obesity (H)      Osteoarthrosis     right knee     Sleep apnea      Tobacco use disorder      Past Surgical History:   Procedure Laterality Date     ARTHROPLASTY KNEE  6/14/2012    Procedure: ARTHROPLASTY KNEE;  Left Total Knee Arthroplasty;  Surgeon: Annette Quesada MD;  Location: [facility] OR     ARTHROSCOPY HIP Right      C TOTAL HIP ARTHROPLASTY  07/09/04    RT     Current Outpatient Prescriptions   Medication     methotrexate 2.5 MG tablet CHEMO     topiramate (TOPAMAX) 25 MG tablet     folic acid (FOLVITE) 1 MG tablet     predniSONE (DELTASONE) 10 MG tablet     predniSONE (DELTASONE) 10 MG tablet     HYDROcodone-acetaminophen (NORCO) 5-325 MG per tablet     " "Ibuprofen (ADVIL PO)     meloxicam (MOBIC) 15 MG tablet     order for DME     aspirin 81 MG tablet     lisinopril (PRINIVIL/ZESTRIL) 10 MG tablet     atorvastatin (LIPITOR) 20 MG tablet     SERTRALINE HCL PO     albuterol (PROAIR HFA/PROVENTIL HFA/VENTOLIN HFA) 108 (90 BASE) MCG/ACT Inhaler     fluocinonide (LIDEX) 0.05 % ointment     budesonide-formoterol (SYMBICORT) 160-4.5 MCG/ACT inhaler     No current facility-administered medications for this visit.        PSYCHIATRIC HISTORY:  Significant for history of depression that started in childhood.  She stated she was not treated with medication until her late 20s.  She is currently seeing a psychiatrist at [facility], which is a telemental health provider in [place].  She is currently taking sertraline 150 mg since 12/2016.  She also has a history of engaging in psychotherapy in her past, most recently at the [facility] in the summer of 2016.  She indicated losing 20 pounds through an intensive outpatient program through portion control, mindfulness, and group support.      SOCIAL HISTORY:  The patient was raised in an \"alcoholic home.\"  She is quite close with her sisters and mother, who live in the [place] area.  She last worked in 04/2016.  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  She is working currently with an  to address this concern.  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  She currently smokes approximately 1 pack per day of cigarettes, and is interested in quitting smoking.      MENTAL STATUS EXAMINATION:  The patient was casually groomed and dressed and maintained good eye contact, and was cooperative with the intake procedures.  Mood was dysphoric and affect was tearful.  She indicated the tearfulness was due to " steroids, but indicated she has been feeling emotionally labile in the recent weeks.  Thoughts were somewhat circumstantial, but she was easily redirected.  Speech was within normal limits for rate, rhythm, volume and tone.  Insight and judgment were fair.  She was able to effectively see alternative perspectives in managing her health condition.  She denied current suicidal or homicidal ideation, plan, or intent.      PSYCHOLOGICAL ASSESSMENTS:  The patient completed several self-report questionnaires at this session.  She indicated extreme difficulty in numerous activities due to her health conditions, such as standing for long periods of time, household responsibilities, and walking long distances.  Her score on the Patient Health Questionnaire-9 was a 15 indicating moderate symptoms of depression.  Her score on the Generalized Anxiety Disorder-7 screener was a 6 indicating mild symptoms of anxiety.  She denied all items on the CAGE-AID and Suicide Behaviors Questionnaire-Revised.      IMPRESSION:  Shira Rodriguez is a 57-year-old female with morbid obesity and history of depression presenting with adjustment difficulties following recent diagnosis of rheumatoid arthritis.  It appears she is experiencing moderate symptoms of depression in the context of change in health status.  It also appears obesity has significantly impacted numerous areas of her life, including physical health, but also emotional well-being and her ability to work.  She endorsed experiencing discrimination related to her weight in several domains of life, including health care experiences and work.  It appears that she manages stress through distraction and social support, but could benefit from learning and applying active stress management strategies.      DIAGNOSES:  Major depressive disorder, recurrent, moderate; psychological factors affecting medical conditions; obesity; rheumatoid arthritis.      PLAN AND RECOMMENDATIONS:  Recommend  cognitive behavioral therapy for management of depression, chronic illness, and weight/health behavior change.  Provided psychoeducation about these interventions, discussing ways for her to maintain optimistic mindset about recovery from health conditions.  The patient agreed with this recommendation for followup care, and recommended she return to clinic at the first available visit.  She agreed with these recommendations.     Mindi Lopez, PhD,   Clinical Health Psychologist    *In accordance with the Rules of the [place] Board of Psychology, it is noted that psychological descriptions and scientific procedures underlying psychological evaluations have limitations.  Absolute predictions cannot be made based on information in this report.            Again, thank you for allowing me to participate in the care of your patient.      Sincerely,    Mindi Lopez, PhD

## 2017-09-18 NOTE — TELEPHONE ENCOUNTER
Patient came in and signed form. Placed form in Dr. Chandra's folder for her review and signature is appropriate.  Emily Garcia CMA  9/18/2017 9:51 AM

## 2017-09-18 NOTE — LETTER
September 27, 2017      TO: Shira Rodriguez  [address] [place] [address]         Dear Ms. Shira Rodriguez,    We received and reviewed your test results.  You may receive more than one letter if we receive the results on multiple days.  Please share all lab and test results with your primary care provider and keep a copy for your own records.      Please increase protein in your diet.  Your albumin is low.    Also please start taking Vitamin D3 10,000 units daily for 2 months and then recheck Vitamin D, Calcium and PTH.  I will enter orders to recheck these labs in 2 months. I will send D3 10,000 units prescription to your pharmacy.     If you have any questions, feel free contact us at the Call Center [phone].      Resulted Orders   Hemoglobin A1c   Result Value Ref Range    Hemoglobin A1C 5.8 4.3 - 6.0 %   Parathyroid Hormone Intact   Result Value Ref Range    Parathyroid Hormone Intact 59 12 - 72 pg/mL   Vitamin D Deficiency   Result Value Ref Range    Vitamin D Deficiency screening 7 (L) 20 - 75 ug/L      Comment:      Season, race, dietary intake, and treatment affect the concentration of   25-hydroxy-Vitamin D. Values may decrease during winter months and increase   during summer months. Values 20-29 ug/L may indicate Vitamin D insufficiency   and values <20 ug/L may indicate Vitamin D deficiency.  Vitamin D determination is routinely performed by an immunoassay specific for   25 hydroxyvitamin D3.  If an individual is on vitamin D2 (ergocalciferol)   supplementation, please specify 25 OH vitamin D2 and D3 level determination by   LCMSMS test [id number].     Comprehensive metabolic panel   Result Value Ref Range    Sodium 138 133 - 144 mmol/L    Potassium 4.3 3.4 - 5.3 mmol/L    Chloride 103 94 - 109 mmol/L    Carbon Dioxide 28 20 - 32 mmol/L    Anion Gap 7 3 - 14 mmol/L    Glucose 132 (H) 70 - 99 mg/dL    Urea Nitrogen 14 7 - 30 mg/dL    Creatinine 0.61 0.52 - 1.04 mg/dL    GFR Estimate >90 >60  mL/min/1.7m2      Comment:      Non  GFR Calc    GFR Estimate If Black >90 >60 mL/min/1.7m2      Comment:       GFR Calc    Calcium 9.2 8.5 - 10.1 mg/dL    Bilirubin Total 0.4 0.2 - 1.3 mg/dL    Albumin 2.9 (L) 3.4 - 5.0 g/dL    Protein Total 7.1 6.8 - 8.8 g/dL    Alkaline Phosphatase 67 40 - 150 U/L    ALT 28 0 - 50 U/L    AST 11 0 - 45 U/L         Sincerely,      Sabi Petersen PA-C

## 2017-09-19 ENCOUNTER — MYC MEDICAL ADVICE (OUTPATIENT)
Dept: OTHER | Age: 57
End: 2017-09-19

## 2017-09-19 LAB
DEPRECATED CALCIDIOL+CALCIFEROL SERPL-MC: 7 UG/L (ref 20–75)
HIV 1+2 AB+HIV1 P24 AG SERPL QL IA: NONREACTIVE

## 2017-09-19 NOTE — TELEPHONE ENCOUNTER
Form Request Documentation    Provider Agreed to complete form? yes    Date Returned to support Staff: 9/18/2017    Date patient notified: 9/19/2017     Disposition of Form: faxed, mailed to patient and scanned into chart    Emily Garcia CMA  9/19/2017 9:26 AM

## 2017-09-19 NOTE — PROGRESS NOTES
"  Health Psychology                  Clinic    Department of Medicine  Mary Rosa, PhD, LP [phone]                          Clinics and Surgery Center  [facility] Jt Park, PhD, LP [phone]                  3rd Floor  [place] Mail Code 741   Tad Abdi, PhD, ABPP, LP [phone]     [address] [facility], [address],  Mindi Lopez,  PhD, LP [phone]            [place] [address] Anjali Rios, PhD, LP [phone]     Confidential Summary of Standard Psychodiagnostic Evaluation*    REFERRAL:  ZEKE Sanchez.        REASON FOR REFERRAL:  Coping with health change.      SOURCES OF INFORMATION:  Patient was seen for a psychodiagnostic evaluation.  Information was obtained from review of the medical records, administration of psychological assessments, and the clinical interview with the patient.      HISTORY OF PRESENT ILLNESS:  Shira Rodriguez is a 57-year-old female with morbid obesity with recent diagnosis of rheumatoid arthritis (RA) who presented seeking support in the context of recent diagnosis of chronic illness.  Current symptoms were reported to be difficulty with worrying, particularly about finances due to job insecurity.  She also endorsed anhedonia, depression, trouble sleeping, fatigue, and feeling bad about herself.  Specifically, she reports having a very supportive family through her mother and sisters, but feels as if she is a burden to them at times.  She also endorsed feeling guilty with difficulty adhering to behavioral interventions for weight loss and smoking cessation.  She endorsed feeling discouraged after diagnosis of RA, despite being relieved to have a diagnosis because it is a chronic condition that cannot be \"cured\".  She reportedly endorsed the following coping strategies:  Watching TV, playing games, and distracting herself with activities around the home.  Regarding history of weight loss, she " "stated she has gained and lost the same \"20-25 pounds\" several times in her life.  Of most success, she lost approximately 40 pounds through the [facility] program in 2012.  However, she looked back at that as a \"failure\" because she did not continue this despite the positive impact of that.  She recently initiated evaluation in the weight loss surgery program and indicated even if she plans to pursue medical weight management to lose weight versus surgical interventions, she feels as if it is an appropriate step to move forward.      PAST MEDICAL HISTORY:  See below list for current medical problems, past surgical history, and current medications.     Past Medical History:   Diagnosis Date     Chronic bronchitis (H) 07/30/2015     Contact dermatitis      Depressive disorder 1985     Eczema     HANDS     Elevated liver enzymes      H/O total knee replacement, left      History of total hip replacement 10/27/2011     Hyperlipidemia      Hypertension      Morbid obesity (H)      Osteoarthrosis     right knee     Sleep apnea      Tobacco use disorder      Past Surgical History:   Procedure Laterality Date     ARTHROPLASTY KNEE  6/14/2012    Procedure: ARTHROPLASTY KNEE;  Left Total Knee Arthroplasty;  Surgeon: Annette Quesada MD;  Location: [facility] OR     ARTHROSCOPY HIP Right      C TOTAL HIP ARTHROPLASTY  07/09/04    RT     Current Outpatient Prescriptions   Medication     methotrexate 2.5 MG tablet CHEMO     topiramate (TOPAMAX) 25 MG tablet     folic acid (FOLVITE) 1 MG tablet     predniSONE (DELTASONE) 10 MG tablet     predniSONE (DELTASONE) 10 MG tablet     HYDROcodone-acetaminophen (NORCO) 5-325 MG per tablet     Ibuprofen (ADVIL PO)     meloxicam (MOBIC) 15 MG tablet     order for DME     aspirin 81 MG tablet     lisinopril (PRINIVIL/ZESTRIL) 10 MG tablet     atorvastatin (LIPITOR) 20 MG tablet     SERTRALINE HCL PO     albuterol (PROAIR HFA/PROVENTIL HFA/VENTOLIN HFA) 108 (90 BASE) MCG/ACT Inhaler     " "fluocinonide (LIDEX) 0.05 % ointment     budesonide-formoterol (SYMBICORT) 160-4.5 MCG/ACT inhaler     No current facility-administered medications for this visit.        PSYCHIATRIC HISTORY:  Significant for history of depression that started in childhood.  She stated she was not treated with medication until her late 20s.  She is currently seeing a psychiatrist at [facility], which is a telemental health provider in [place].  She is currently taking sertraline 150 mg since 12/2016.  She also has a history of engaging in psychotherapy in her past, most recently at the [facility] in the summer of 2016.  She indicated losing 20 pounds through an intensive outpatient program through portion control, mindfulness, and group support.      SOCIAL HISTORY:  The patient was raised in an \"alcoholic home.\"  She is quite close with her sisters and mother, who live in the [place] area.  She last worked in 04/2016.  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  She is working currently with an  to address this concern.  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  She currently smokes approximately 1 pack per day of cigarettes, and is interested in quitting smoking.      MENTAL STATUS EXAMINATION:  The patient was casually groomed and dressed and maintained good eye contact, and was cooperative with the intake procedures.  Mood was dysphoric and affect was tearful.  She indicated the tearfulness was due to steroids, but indicated she has been feeling emotionally labile in the recent weeks.  Thoughts were somewhat circumstantial, but she was easily redirected.  Speech was within normal limits for rate, rhythm, volume and tone.  Insight and judgment were fair.  She was able to effectively see " alternative perspectives in managing her health condition.  She denied current suicidal or homicidal ideation, plan, or intent.      PSYCHOLOGICAL ASSESSMENTS:  The patient completed several self-report questionnaires at this session.  She indicated extreme difficulty in numerous activities due to her health conditions, such as standing for long periods of time, household responsibilities, and walking long distances.  Her score on the Patient Health Questionnaire-9 was a 15 indicating moderate symptoms of depression.  Her score on the Generalized Anxiety Disorder-7 screener was a 6 indicating mild symptoms of anxiety.  She denied all items on the CAGE-AID and Suicide Behaviors Questionnaire-Revised.      IMPRESSION:  Shira Rodriguez is a 57-year-old female with morbid obesity and history of depression presenting with adjustment difficulties following recent diagnosis of rheumatoid arthritis.  It appears she is experiencing moderate symptoms of depression in the context of change in health status.  It also appears obesity has significantly impacted numerous areas of her life, including physical health, but also emotional well-being and her ability to work.  She endorsed experiencing discrimination related to her weight in several domains of life, including health care experiences and work.  It appears that she manages stress through distraction and social support, but could benefit from learning and applying active stress management strategies.      DIAGNOSES:  Major depressive disorder, recurrent, moderate; psychological factors affecting medical conditions; obesity; rheumatoid arthritis.      PLAN AND RECOMMENDATIONS:  Recommend cognitive behavioral therapy for management of depression, chronic illness, and weight/health behavior change.  Provided psychoeducation about these interventions, discussing ways for her to maintain optimistic mindset about recovery from health conditions.  The patient agreed with this  recommendation for followup care, and recommended she return to clinic at the first available visit.  She agreed with these recommendations.     Mindi Lopez, PhD,   Clinical Health Psychologist    *In accordance with the Rules of the [place] Board of Psychology, it is noted that psychological descriptions and scientific procedures underlying psychological evaluations have limitations.  Absolute predictions cannot be made based on information in this report.

## 2017-10-16 ENCOUNTER — OFFICE VISIT (OUTPATIENT)
Dept: RHEUMATOLOGY | Facility: CLINIC | Age: 57
End: 2017-10-16
Attending: INTERNAL MEDICINE
Payer: COMMERCIAL

## 2017-10-16 ENCOUNTER — OFFICE VISIT (OUTPATIENT)
Dept: FAMILY MEDICINE | Facility: CLINIC | Age: 57
End: 2017-10-16

## 2017-10-16 VITALS
DIASTOLIC BLOOD PRESSURE: 80 MMHG | HEART RATE: 106 BPM | BODY MASS INDEX: 63.56 KG/M2 | SYSTOLIC BLOOD PRESSURE: 142 MMHG | OXYGEN SATURATION: 97 % | WEIGHT: 293 LBS

## 2017-10-16 VITALS — DIASTOLIC BLOOD PRESSURE: 74 MMHG | HEART RATE: 87 BPM | SYSTOLIC BLOOD PRESSURE: 120 MMHG

## 2017-10-16 DIAGNOSIS — R79.89 ELEVATED LFTS: ICD-10-CM

## 2017-10-16 DIAGNOSIS — M77.9 ENTHESOPATHY: Primary | ICD-10-CM

## 2017-10-16 DIAGNOSIS — M05.79 RHEUMATOID ARTHRITIS INVOLVING MULTIPLE SITES WITH POSITIVE RHEUMATOID FACTOR (H): ICD-10-CM

## 2017-10-16 DIAGNOSIS — Z79.899 HIGH RISK MEDICATION USE: ICD-10-CM

## 2017-10-16 DIAGNOSIS — Z12.11 SCREEN FOR COLON CANCER: ICD-10-CM

## 2017-10-16 DIAGNOSIS — M05.741 RHEUMATOID ARTHRITIS INVOLVING BOTH HANDS WITH POSITIVE RHEUMATOID FACTOR (H): ICD-10-CM

## 2017-10-16 DIAGNOSIS — M05.742 RHEUMATOID ARTHRITIS INVOLVING BOTH HANDS WITH POSITIVE RHEUMATOID FACTOR (H): ICD-10-CM

## 2017-10-16 DIAGNOSIS — M15.9 OSTEOARTHRITIS OF MULTIPLE JOINTS, UNSPECIFIED OSTEOARTHRITIS TYPE: ICD-10-CM

## 2017-10-16 DIAGNOSIS — Z00.00 ROUTINE GENERAL MEDICAL EXAMINATION AT A HEALTH CARE FACILITY: ICD-10-CM

## 2017-10-16 DIAGNOSIS — Z23 NEED FOR PROPHYLACTIC VACCINATION AND INOCULATION AGAINST INFLUENZA: Primary | ICD-10-CM

## 2017-10-16 LAB
ALT SERPL W P-5'-P-CCNC: 32 U/L (ref 0–50)
AST SERPL W P-5'-P-CCNC: 14 U/L (ref 0–45)
BASOPHILS # BLD AUTO: 0 10E9/L (ref 0–0.2)
BASOPHILS NFR BLD AUTO: 0.3 %
CREAT SERPL-MCNC: 0.65 MG/DL (ref 0.52–1.04)
CRP SERPL-MCNC: 5.2 MG/L (ref 0–8)
DIFFERENTIAL METHOD BLD: ABNORMAL
EOSINOPHIL # BLD AUTO: 0.2 10E9/L (ref 0–0.7)
EOSINOPHIL NFR BLD AUTO: 1.6 %
ERYTHROCYTE [DISTWIDTH] IN BLOOD BY AUTOMATED COUNT: 16.6 % (ref 10–15)
ERYTHROCYTE [SEDIMENTATION RATE] IN BLOOD BY WESTERGREN METHOD: 11 MM/H (ref 0–30)
GFR SERPL CREATININE-BSD FRML MDRD: >90 ML/MIN/1.7M2
HCT VFR BLD AUTO: 48.4 % (ref 35–47)
HGB BLD-MCNC: 16 G/DL (ref 11.7–15.7)
IMM GRANULOCYTES # BLD: 0.1 10E9/L (ref 0–0.4)
IMM GRANULOCYTES NFR BLD: 0.7 %
LYMPHOCYTES # BLD AUTO: 1.5 10E9/L (ref 0.8–5.3)
LYMPHOCYTES NFR BLD AUTO: 14.6 %
MCH RBC QN AUTO: 29.9 PG (ref 26.5–33)
MCHC RBC AUTO-ENTMCNC: 33.1 G/DL (ref 31.5–36.5)
MCV RBC AUTO: 91 FL (ref 78–100)
MONOCYTES # BLD AUTO: 0.6 10E9/L (ref 0–1.3)
MONOCYTES NFR BLD AUTO: 6.2 %
NEUTROPHILS # BLD AUTO: 7.9 10E9/L (ref 1.6–8.3)
NEUTROPHILS NFR BLD AUTO: 76.6 %
NRBC # BLD AUTO: 0 10*3/UL
NRBC BLD AUTO-RTO: 0 /100
PLATELET # BLD AUTO: 239 10E9/L (ref 150–450)
RBC # BLD AUTO: 5.35 10E12/L (ref 3.8–5.2)
WBC # BLD AUTO: 10.2 10E9/L (ref 4–11)

## 2017-10-16 PROCEDURE — 84450 TRANSFERASE (AST) (SGOT): CPT

## 2017-10-16 PROCEDURE — 85652 RBC SED RATE AUTOMATED: CPT

## 2017-10-16 PROCEDURE — 84460 ALANINE AMINO (ALT) (SGPT): CPT

## 2017-10-16 PROCEDURE — 82565 ASSAY OF CREATININE: CPT

## 2017-10-16 PROCEDURE — 99212 OFFICE O/P EST SF 10 MIN: CPT | Mod: ZF

## 2017-10-16 RX ORDER — PREDNISONE 5 MG/1
TABLET ORAL
Qty: 10 TABLET | Refills: 2 | Status: SHIPPED | OUTPATIENT
Start: 2017-10-16 | End: 2017-12-04

## 2017-10-16 ASSESSMENT — PAIN SCALES - GENERAL
PAINLEVEL: MODERATE PAIN (4)
PAINLEVEL: MODERATE PAIN (4)

## 2017-10-16 ASSESSMENT — PATIENT HEALTH QUESTIONNAIRE - PHQ9: SUM OF ALL RESPONSES TO PHQ QUESTIONS 1-9: 16

## 2017-10-16 NOTE — NURSING NOTE
"Injectable Influenza Immunization Documentation    1.  Has the patient received the information for the injectable influenza vaccine?        2. Is the patient 6 months of age or older? YES     3. Does the patient have any of the following contraindications?         Severe allergy to eggs? No     Severe allergic reaction to previous influenza vaccines? No   Severe allergy to latex? No       History of Guillain-[place] syndrome? No     Currently have a temperature greater than 100.4F? No        4.  Severely egg allergic patients should have flu vaccine eligibility assessed by an MD, RN, or pharmacist, and those who received flu vaccine should be observed for 15 min by an MD, RN, Pharmacist, Medical Technician, or member of clinic staff.\": NO    5. Latex-allergic patients should be given latex-free influenza vaccine NO. Please reference the Vaccine latex table to determine if your clinic s product is latex-containing.     Vaccination given by: Aura London CMA at 11:02 AM on 10/16/2017          "

## 2017-10-16 NOTE — PROGRESS NOTES
Rheumatology Attending:    This patient was interviewed and examined in the presence of the medical fellow, and this note reflects our mutual impression. My additional thoughts are as follows:    CCP+ RA, smoking, morbid obesity, HTN, and DJD. Improvement of inflammatory markers, symptoms and exan on methotrexate. Agree with continued methotrexate therapy and prednisone taper.    Torsten Rand MD  Professor of Medicine  Director, Division of Rheumatic and Autoimmune Diseases    Component      Latest Ref Rng & Units 8/28/2017 9/18/2017   WBC      4.0 - 11.0 10e9/L  8.7   RBC Count      3.8 - 5.2 10e12/L  5.47 (H)   Hemoglobin      11.7 - 15.7 g/dL  15.8 (H)   Hematocrit      35.0 - 47.0 %  49.1 (H)   MCV      78 - 100 fl  90   MCH      26.5 - 33.0 pg  28.9   MCHC      31.5 - 36.5 g/dL  32.2   RDW      10.0 - 15.0 %  14.6   Platelet Count      150 - 450 10e9/L  260   Diff Method        Automated Method   % Neutrophils      %  85.5   % Lymphocytes      %  9.4   % Monocytes      %  4.3   % Eosinophils      %  0.0   % Basophils      %  0.1   % Immature Granulocytes      %  0.7   Nucleated RBCs      0 /100  0   Absolute Neutrophil      1.6 - 8.3 10e9/L  7.4   Absolute Lymphocytes      0.8 - 5.3 10e9/L  0.8   Absolute Monocytes      0.0 - 1.3 10e9/L  0.4   Absolute Eosinophils      0.0 - 0.7 10e9/L  0.0   Absolute Basophils      0.0 - 0.2 10e9/L  0.0   Abs Immature Granulocytes      0 - 0.4 10e9/L  0.1   Absolute Nucleated RBC        0.0   Color Urine        Yellow   Appearance Urine        Slightly Cloudy   Glucose Urine      NEG:Negative mg/dL  Negative   Bilirubin Urine      NEG:Negative  Negative   Ketones Urine      NEG:Negative mg/dL  Negative   Specific Gravity Urine      1.003 - 1.035  1.024   Blood Urine      NEG:Negative  Negative   pH Urine      5.0 - 7.0 pH  5.0   Protein Albumin Urine      NEG:Negative mg/dL  Negative   Urobilinogen mg/dL      0.0 - 2.0 mg/dL  0.0   Nitrite Urine      NEG:Negative   Negative   Leukocyte Esterase Urine      NEG:Negative  Negative   Source        Midstream Urine   WBC Urine      0 - 2 /HPF  1   RBC Urine      0 - 2 /HPF  1   Bacteria Urine      NEG:Negative /HPF  Few (A)   Squamous Epithelial /HPF Urine      0 - 1 /HPF  2 (H)   Mucous Urine      NEG:Negative /LPF  Present (A)   Sodium      133 - 144 mmol/L  138   Potassium      3.4 - 5.3 mmol/L  4.3   Chloride      94 - 109 mmol/L  103   Carbon Dioxide      20 - 32 mmol/L  28   Anion Gap      3 - 14 mmol/L  7   Glucose      70 - 99 mg/dL  132 (H)   Urea Nitrogen      7 - 30 mg/dL  14   Creatinine      0.52 - 1.04 mg/dL 0.64 0.61   GFR Estimate      >60 mL/min/1.7m2 >90 >90   GFR Estimate If Black      >60 mL/min/1.7m2 >90 >90   Calcium      8.5 - 10.1 mg/dL  9.2   Bilirubin Total      0.2 - 1.3 mg/dL 0.4 0.4   Albumin      3.4 - 5.0 g/dL 3.1 (L) 2.9 (L)   Protein Total      6.8 - 8.8 g/dL 7.4 7.1   Alkaline Phosphatase      40 - 150 U/L 62 67   ALT      0 - 50 U/L 21 28   AST      0 - 45 U/L 10 11   Bilirubin Direct      0.0 - 0.2 mg/dL 0.1    M Tuberculosis Result      NEG:Negative Negative    M Tuberculosis Antigen Value      IU/mL 0.01    Rheumatoid Factor      <20 IU/mL 51 (H)    Cyclic Citrullinated Peptide Antibody, IgG      <7 U/mL 15 (H)    Sed Rate      0 - 30 mm/h 24 14   CRP Inflammation      0.0 - 8.0 mg/L 14.1 (H) 7.6   HIV Antigen Antibody Combo      NR:Nonreactive      Nonreactive

## 2017-10-16 NOTE — MR AVS SNAPSHOT
After Visit Summary   10/16/2017    Shira Rodriguez    MRN: [id number]           Patient Information     Date Of Birth          1960        Visit Information        Provider Department      10/16/2017 9:35 AM Anjel Busby MD [facility]        Today's Diagnoses     Need for prophylactic vaccination and inoculation against influenza    -  1    Screen for colon cancer        Routine general medical examination at a health care facility          Care Instructions    Primary Care Center: [phone]     Primary Care Center Medication Refill Request Information:  * Please contact your pharmacy regarding ANY request for medication refills.  ** [facility] Prescription Fax = [phone]  * Please allow 3 business days for routine medication refills.  * Please allow 5 business days for controlled substance medication refills.     Primary Care Center Test Result notification information:  *You will be notified with in 7-10 days of your appointment day regarding the results of your test.  If you are on MyChart you will be notified as soon as the provider has reviewed the results and signed off on them.            Follow-ups after your visit        Additional Services     OB/GYN REFERRAL       Your provider has referred you to:  [facility]: [facility] [phone]   [email or web address]    Please be aware that coverage of these services is subject to the terms and limitations of your health insurance plan.  Call member services at your health plan with any benefit or coverage questions.      Please bring the following with you to your appointment:    (1) Any X-Rays, CTs or MRIs which have been performed.  Contact the facility where they were done to arrange for  prior to your scheduled appointment.   (2) List of current medications   (3) This referral request   (4) Any documents/labs given to you for this  referral                  Follow-up notes from your care team     Return in about 1 month (around 11/16/2017).      Your next 10 appointments already scheduled     Dec 04, 2017 10:00 AM CST   (Arrive by 9:45 AM)   Return Visit with Rosita Chandra MD   [facility] Rheumatology ([facility])    [address]  [address] [facility] [address]   [phone]            Dec 15, 2017 10:40 AM CST   (Arrive by 10:25 AM)   Return Visit with Jas Dykes MD   [facility] Medical Weight Management ([facility])    [address]  [address] [facility] [id number] [phone]              Future tests that were ordered for you today     Open Future Orders        Priority Expected Expires Ordered    Fecal colorectal cancer screen FIT - Future (S+30) Routine 11/6/2017 11/15/2017 10/16/2017            Who to contact     Please call your clinic at [phone] to:    Ask questions about your health    Make or cancel appointments    Discuss your medicines    Learn about your test results    Speak to your doctor   If you have compliments or concerns about an experience at your clinic, or if you wish to file a complaint, please contact [facility] Patient Relations at [phone] or email us at [email or web address]         Additional Information About Your Visit        MyChart Information     [employer] gives you secure access to your electronic health record. If you see a primary care provider, you can also send messages to your care team and make appointments. If you have questions, please call your primary care clinic.  If you do not have a primary care provider, please call [phone] and they will assist you.      [employer] is an electronic gateway that provides easy, online access to your medical records. With [employer], you can request a clinic appointment, read your test results, renew a prescription or  communicate with your care team.     To access your existing account, please contact your [facility] or call [phone] for assistance.        Care EveryWhere ID     This is your Care EveryWhere ID. This could be used by other organizations to access your [facility] medical records  [id number]        Your Vitals Were     Pulse                   87            Blood Pressure from Last 3 Encounters:   10/16/17 120/74   10/16/17 142/80   09/15/17 116/72    Weight from Last 3 Encounters:   10/16/17 (!) 178.6 kg (393 lb 12.8 oz)   09/15/17 (!) 176.7 kg (389 lb 8 oz)   09/13/17 (!) 176.8 kg (389 lb 12.8 oz)              We Performed the Following     FLU VACCINE, 3 YRS +, IM  [40218]     OB/GYN REFERRAL          Today's Medication Changes          These changes are accurate as of: 10/16/17 10:56 AM.  If you have any questions, ask your nurse or doctor.               These medicines have changed or have updated prescriptions.        Dose/Directions    atorvastatin 20 MG tablet   Commonly known as:  LIPITOR   This may have changed:  when to take this        Dose:  20 mg   Take 1 tablet (20 mg) by mouth daily   Quantity:  90 tablet   Refills:  3       * predniSONE 10 MG tablet   Commonly known as:  DELTASONE   This may have changed:    - how much to take  - how to take this  - when to take this  - additional instructions   Used for:  Flare of rheumatoid arthritis (H)   Changed by:  Rosita Chandra MD        Take 30 mg (3 tablets) daily x 3 days, then 20 mg (2 tablets) daily for 3 days   Quantity:  15 tablet   Refills:  0       * predniSONE 5 MG tablet   Commonly known as:  DELTASONE   This may have changed:    - medication strength  - additional instructions   Used for:  Rheumatoid arthritis involving both hands with positive rheumatoid factor (H)   Changed by:  Rosita Chandra MD        Take 2.5 mg x2 weeks, then stop   Quantity:  10 tablet   Refills:  2       * Notice:  This  list has 2 medication(s) that are the same as other medications prescribed for you. Read the directions carefully, and ask your doctor or other care provider to review them with you.      Stop taking these medicines if you haven't already. Please contact your care team if you have questions.     budesonide-formoterol 160-4.5 MCG/ACT Inhaler   Commonly known as:  SYMBICORT   Stopped by:  Anjel Busby MD                Where to get your medicines      These medications were sent to [id number] - [place], [place] - [address] [facility]  [address], [place] [address]    Hours:  24-hours Phone:  [phone]     predniSONE 5 MG tablet                Primary Care Provider Office Phone # Fax #    Anjel Busby [phone]       [address] [address]        Equal Access to Services     [facility] Hospital[facility] : Hadii dionisio ku hadasho Soomaali, waaxda luqadaha, qaybta kaalmada adeegyada, waxay haydenin hayflorentinon danielle herring . So [facility] [phone].    ATENCIÓN: Si habla español, tiene a samano disposición servicios gratuitos de asistencia lingüística. [facility] [phone].    We comply with applicable federal civil rights laws and [place] laws. We do not discriminate on the basis of race, color, national origin, age, disability, sex, sexual orientation, or gender identity.            Thank you!     Thank you for choosing [facility]  for your care. Our goal is always to provide you with excellent care. Hearing back from our patients is one way we can continue to improve our services. Please take a few minutes to complete the written survey that you may receive in the mail after your visit with us. Thank you!             Your Updated Medication List - Protect others around you: Learn how to safely use, store and throw away your medicines at www.disposemymeds.org.          This list is accurate as of: 10/16/17  10:56 AM.  Always use your most recent med list.                   Brand Name Dispense Instructions for use Diagnosis    ADVIL PO      Take 400 mg by mouth every 4 hours as needed        albuterol 108 (90 BASE) MCG/ACT Inhaler    PROAIR HFA/PROVENTIL HFA/VENTOLIN HFA    1 Inhaler    Inhale 2 puffs into the lungs every 6 hours as needed for shortness of breath / dyspnea or wheezing    SANTOS (dyspnea on exertion)       aspirin 81 MG tablet      Take 81 mg by mouth every morning        atorvastatin 20 MG tablet    LIPITOR    90 tablet    Take 1 tablet (20 mg) by mouth daily        cholecalciferol 78404 UNITS capsule    vitamin D3    30 capsule    Take 1 capsule (10,000 Units) by mouth daily    Vitamin D deficiency       fluocinonide 0.05 % ointment    LIDEX    60 g    Apply topically daily To hands and feet when dermatitis active    Dermatitis       folic acid 1 MG tablet    FOLVITE    90 tablet    Take 1 tablet (1 mg) by mouth daily    High risk medication use, Rheumatoid arthritis involving multiple sites with positive rheumatoid factor (H)       HYDROcodone-acetaminophen 5-325 MG per tablet    NORCO    40 tablet    Take 1-2 tabs daily as needed for pain.    Chronic pain of right knee       lisinopril 10 MG tablet    PRINIVIL/ZESTRIL    90 tablet    Take 1 tablet (10 mg) by mouth daily    Essential hypertension       meloxicam 15 MG tablet    MOBIC    30 tablet    Take 1 tablet (15 mg) by mouth daily    Bilateral carpal tunnel syndrome       methotrexate 2.5 MG tablet CHEMO     40 tablet    Take 10 tablets (25 mg) by mouth once a week Take 5 in AM, 5 in PM once weekly. Labs in 4 wks.    Rheumatoid arthritis involving multiple sites with positive rheumatoid factor (H)       order for DME      Equipment being ordered: BIPAP 19/13 CM H20 AIRCURVE 10 AIRFIT F20 # [id number]   DN# [id number]        * predniSONE 10 MG tablet    DELTASONE    15 tablet    Take 30 mg (3 tablets) daily x 3 days, then 20 mg (2 tablets) daily for 3  days    Flare of rheumatoid arthritis (H)       * predniSONE 5 MG tablet    DELTASONE    10 tablet    Take 2.5 mg x2 weeks, then stop    Rheumatoid arthritis involving both hands with positive rheumatoid factor (H)       SERTRALINE HCL PO      Take 150 mg by mouth every morning        topiramate 25 MG tablet    TOPAMAX    90 tablet    25mg at bedtime for week 1, 50mg at bedtime for 1 week, and 75mg at bedtime thereafter    Morbid obesity due to excess calories (H)       * Notice:  This list has 2 medication(s) that are the same as other medications prescribed for you. Read the directions carefully, and ask your doctor or other care provider to review them with you.

## 2017-10-16 NOTE — PATIENT INSTRUCTIONS
[facility]: [phone]     [facility] Medication Refill Request Information:  * Please contact your pharmacy regarding ANY request for medication refills.  ** [facility] Prescription Fax = [phone]  * Please allow 3 business days for routine medication refills.  * Please allow 5 business days for controlled substance medication refills.     [facility] Test Result notification information:  *You will be notified with in 7-10 days of your appointment day regarding the results of your test.  If you are on MyChart you will be notified as soon as the provider has reviewed the results and signed off on them.

## 2017-10-16 NOTE — MR AVS SNAPSHOT
"              After Visit Summary   10/16/2017    Shira Rodriguez    MRN: [id number]           Patient Information     Date Of Birth          1960        Visit Information        Provider Department      10/16/2017 8:30 AM Rosita Chandra MD [facility] Rheumatology        Today's Diagnoses     Enthesopathy    -  1    Osteoarthritis of multiple joints, unspecified osteoarthritis type        Rheumatoid arthritis involving both hands with positive rheumatoid factor (H)           Follow-ups after your visit        Additional Services     PHYSICAL THERAPY REFERRAL       *This therapy referral will be filtered to a centralized scheduling office at [facility] and the patient will receive a call to schedule an appointment at a [place] location most convenient for them. *     [facility] provides Physical Therapy evaluation and treatment and many specialty services across the [place] system.  If requesting a specialty program, please choose from the list below.    If you have not heard from the scheduling office within 2 business days, please call [phone] for all locations, with the exception of [place], please call [phone].  Treatment: Evaluation & Treatment  Special Instructions/Modalities: for low back and R knee  Special Programs: None    Please be aware that coverage of these services is subject to the terms and limitations of your health insurance plan.  Call member services at your health plan with any benefit or coverage questions.      **Note to Provider:  If you are referring outside of [place] for the therapy appointment, please list the name of the location in the \"special instructions\" above, print the referral and give to the patient to schedule the appointment.            PODIATRY/FOOT & ANKLE SURGERY REFERRAL       Your provider has referred you to: PREFERRED PROVIDERS: L heel pain    Please be aware that coverage of these services is subject to " the terms and limitations of your health insurance plan.  Call member services at your health plan with any benefit or coverage questions.      Please bring the following to your appointment:  >>   Any x-rays, CTs or MRIs which have been performed.  Contact the facility where they were done to arrange for  prior to your scheduled appointment.    >>   List of current medications   >>   This referral request   >>   Any documents/labs given to you for this referral                  Follow-up notes from your care team     Return in about 8 weeks (around 12/11/2017).      Your next 10 appointments already scheduled     Dec 04, 2017 10:00 AM CST   (Arrive by 9:45 AM)   Return Visit with Rosita Chandra MD   [facility] Rheumatology ([facility])    [address] [place] [address]  [address] [facility] [address] [phone]            Dec 15, 2017 10:40 AM CST   (Arrive by 10:25 AM)   Return Visit with Jas Dykes MD   [facility] Medical Weight Management ([facility])    [address] [place] [address]  [address] [facility] [address] [phone]              Who to contact     If you have questions or need follow up information about today's clinic visit or your schedule please contact [facility] RHEUMATOLOGY directly at [phone].  Normal or non-critical lab and imaging results will be communicated to you by EventMamahart, letter or phone within 4 business days after the clinic has received the results. If you do not hear from us within 7 days, please contact the clinic through EventMamahart or phone. If you have a critical or abnormal lab result, we will notify you by phone as soon as possible.  Submit refill requests through Offerboard or call your pharmacy and they will forward the refill request to us. Please allow 3 business days for your refill to be completed.          Additional Information About Your Visit        Offerboard Information     Offerboard gives you secure  access to your electronic health record. If you see a primary care provider, you can also send messages to your care team and make appointments. If you have questions, please call your primary care clinic.  If you do not have a primary care provider, please call [phone] and they will assist you.        Care EveryWhere ID     This is your Care EveryWhere ID. This could be used by other organizations to access your [place] medical records  [id number]        Your Vitals Were     Pulse Pulse Oximetry BMI (Body Mass Index)             106 97% 63.56 kg/m2          Blood Pressure from Last 3 Encounters:   10/16/17 142/80   09/15/17 116/72   09/13/17 126/81    Weight from Last 3 Encounters:   10/16/17 (!) 178.6 kg (393 lb 12.8 oz)   09/15/17 (!) 176.7 kg (389 lb 8 oz)   09/13/17 (!) 176.8 kg (389 lb 12.8 oz)              We Performed the Following     PHYSICAL THERAPY REFERRAL     PODIATRY/FOOT & ANKLE SURGERY REFERRAL          Today's Medication Changes          These changes are accurate as of: 10/16/17  9:43 AM.  If you have any questions, ask your nurse or doctor.               These medicines have changed or have updated prescriptions.        Dose/Directions    atorvastatin 20 MG tablet   Commonly known as:  LIPITOR   This may have changed:  when to take this        Dose:  20 mg   Take 1 tablet (20 mg) by mouth daily   Quantity:  90 tablet   Refills:  3       * predniSONE 10 MG tablet   Commonly known as:  DELTASONE   This may have changed:    - how much to take  - how to take this  - when to take this  - additional instructions   Used for:  Flare of rheumatoid arthritis (H)   Changed by:  Rosita Chandra MD        Take 30 mg (3 tablets) daily x 3 days, then 20 mg (2 tablets) daily for 3 days   Quantity:  15 tablet   Refills:  0       * predniSONE 10 MG tablet   Commonly known as:  DELTASONE   This may have changed:    - Another medication with the same name was added. Make sure you understand how and  when to take each.  - Another medication with the same name was changed. Make sure you understand how and when to take each.   Used for:  Rheumatoid arthritis involving multiple sites with positive rheumatoid factor (H), High risk medication use   Changed by:  Rosita Chandra MD        Take 30 mg qd x5d, 20 mg x5d, 10 mg x5d, 5 mg daily until see rheumatology next   Quantity:  100 tablet   Refills:  1       * predniSONE 5 MG tablet   Commonly known as:  DELTASONE   This may have changed:  You were already taking a medication with the same name, and this prescription was added. Make sure you understand how and when to take each.   Used for:  Rheumatoid arthritis involving both hands with positive rheumatoid factor (H)   Changed by:  Rosita Chandra MD        Take 2.5 mg x2 weeks, then stop   Quantity:  10 tablet   Refills:  2       * Notice:  This list has 3 medication(s) that are the same as other medications prescribed for you. Read the directions carefully, and ask your doctor or other care provider to review them with you.         Where to get your medicines      These medications were sent to [facility] [place] - [place], [place] - [address] [facility]  [address], [place] [address]    Hours:  24-hours Phone:  [phone]     predniSONE 5 MG tablet                Primary Care Provider Office Phone # Fax #    Anjel Busby [phone]       [address] [address]        Equal Access to Services     VARSHA FLOREZ AH: Hadii dionisio sanderso Soalisha, waaxda luqadaha, qaybta kaalmada adeegyada, andreas tello. So [facility] [phone].    ATENCIÓN: Si habla español, tiene a samano disposición servicios gratuitos de asistencia lingüística. Llame al [phone].    We comply with applicable federal civil rights laws and [place] laws. We do not discriminate on the basis of race, color, national  origin, age, disability, sex, sexual orientation, or gender identity.            Thank you!     Thank you for choosing [facility] RHEUMATOLOGY  for your care. Our goal is always to provide you with excellent care. Hearing back from our patients is one way we can continue to improve our services. Please take a few minutes to complete the written survey that you may receive in the mail after your visit with us. Thank you!             Your Updated Medication List - Protect others around you: Learn how to safely use, store and throw away your medicines at www.disposemymeds.org.          This list is accurate as of: 10/16/17  9:43 AM.  Always use your most recent med list.                   Brand Name Dispense Instructions for use Diagnosis    ADVIL PO      Take 400 mg by mouth every 4 hours as needed        albuterol 108 (90 BASE) MCG/ACT Inhaler    PROAIR HFA/PROVENTIL HFA/VENTOLIN HFA    1 Inhaler    Inhale 2 puffs into the lungs every 6 hours as needed for shortness of breath / dyspnea or wheezing    SANTOS (dyspnea on exertion)       aspirin 81 MG tablet      Take 81 mg by mouth every morning        atorvastatin 20 MG tablet    LIPITOR    90 tablet    Take 1 tablet (20 mg) by mouth daily        budesonide-formoterol 160-4.5 MCG/ACT Inhaler    SYMBICORT    1 Inhaler    Inhale 2 puffs into the lungs 2 times daily    Cough       cholecalciferol 18219 UNITS capsule    vitamin D3    30 capsule    Take 1 capsule (10,000 Units) by mouth daily    Vitamin D deficiency       fluocinonide 0.05 % ointment    LIDEX    60 g    Apply topically daily To hands and feet when dermatitis active    Dermatitis       folic acid 1 MG tablet    FOLVITE    90 tablet    Take 1 tablet (1 mg) by mouth daily    High risk medication use, Rheumatoid arthritis involving multiple sites with positive rheumatoid factor (H)       HYDROcodone-acetaminophen 5-325 MG per tablet    NORCO    40 tablet    Take 1-2 tabs daily as needed for pain.    Chronic pain of  right knee       lisinopril 10 MG tablet    PRINIVIL/ZESTRIL    90 tablet    Take 1 tablet (10 mg) by mouth daily    Essential hypertension       meloxicam 15 MG tablet    MOBIC    30 tablet    Take 1 tablet (15 mg) by mouth daily    Bilateral carpal tunnel syndrome       methotrexate 2.5 MG tablet CHEMO     40 tablet    Take 10 tablets (25 mg) by mouth once a week Take 5 in AM, 5 in PM once weekly. Labs in 4 wks.    Rheumatoid arthritis involving multiple sites with positive rheumatoid factor (H)       order for DME      Equipment being ordered: BIPAP 19/13 CM H20 AIRCURVE 10 AIRFIT F20 # [id number]   DN# [id number]        * predniSONE 10 MG tablet    DELTASONE    15 tablet    Take 30 mg (3 tablets) daily x 3 days, then 20 mg (2 tablets) daily for 3 days    Flare of rheumatoid arthritis (H)       * predniSONE 10 MG tablet    DELTASONE    100 tablet    Take 30 mg qd x5d, 20 mg x5d, 10 mg x5d, 5 mg daily until see rheumatology next    Rheumatoid arthritis involving multiple sites with positive rheumatoid factor (H), High risk medication use       * predniSONE 5 MG tablet    DELTASONE    10 tablet    Take 2.5 mg x2 weeks, then stop    Rheumatoid arthritis involving both hands with positive rheumatoid factor (H)       SERTRALINE HCL PO      Take 150 mg by mouth every morning        topiramate 25 MG tablet    TOPAMAX    90 tablet    25mg at bedtime for week 1, 50mg at bedtime for 1 week, and 75mg at bedtime thereafter    Morbid obesity due to excess calories (H)       * Notice:  This list has 3 medication(s) that are the same as other medications prescribed for you. Read the directions carefully, and ask your doctor or other care provider to review them with you.

## 2017-10-16 NOTE — LETTER
10/16/2017      RE: Shira Rodriguez  [address] [facility] [address]       Rheumatology Attending:    This patient was interviewed and examined in the presence of the medical fellow, and this note reflects our mutual impression. My additional thoughts are as follows:    CCP+ RA, smoking, morbid obesity, HTN, and DJD. Improvement of inflammatory markers, symptoms and exan on methotrexate. Agree with continued methotrexate therapy and prednisone taper.    Torsten Rand MD  Professor of Medicine  Director, Division of Rheumatic and Autoimmune Diseases    Component      Latest Ref Rng & Units 8/28/2017 9/18/2017   WBC      4.0 - 11.0 10e9/L  8.7   RBC Count      3.8 - 5.2 10e12/L  5.47 (H)   Hemoglobin      11.7 - 15.7 g/dL  15.8 (H)   Hematocrit      35.0 - 47.0 %  49.1 (H)   MCV      78 - 100 fl  90   MCH      26.5 - 33.0 pg  28.9   MCHC      31.5 - 36.5 g/dL  32.2   RDW      10.0 - 15.0 %  14.6   Platelet Count      150 - 450 10e9/L  260   Diff Method        Automated Method   % Neutrophils      %  85.5   % Lymphocytes      %  9.4   % Monocytes      %  4.3   % Eosinophils      %  0.0   % Basophils      %  0.1   % Immature Granulocytes      %  0.7   Nucleated RBCs      0 /100  0   Absolute Neutrophil      1.6 - 8.3 10e9/L  7.4   Absolute Lymphocytes      0.8 - 5.3 10e9/L  0.8   Absolute Monocytes      0.0 - 1.3 10e9/L  0.4   Absolute Eosinophils      0.0 - 0.7 10e9/L  0.0   Absolute Basophils      0.0 - 0.2 10e9/L  0.0   Abs Immature Granulocytes      0 - 0.4 10e9/L  0.1   Absolute Nucleated RBC        0.0   Color Urine        Yellow   Appearance Urine        Slightly Cloudy   Glucose Urine      NEG:Negative mg/dL  Negative   Bilirubin Urine      NEG:Negative  Negative   Ketones Urine      NEG:Negative mg/dL  Negative   Specific Gravity Urine      1.003 - 1.035  1.024   Blood Urine      NEG:Negative  Negative   pH Urine      5.0 - 7.0 pH  5.0   Protein Albumin Urine      NEG:Negative mg/dL  Negative    Urobilinogen mg/dL      0.0 - 2.0 mg/dL  0.0   Nitrite Urine      NEG:Negative  Negative   Leukocyte Esterase Urine      NEG:Negative  Negative   Source        Midstream Urine   WBC Urine      0 - 2 /HPF  1   RBC Urine      0 - 2 /HPF  1   Bacteria Urine      NEG:Negative /HPF  Few (A)   Squamous Epithelial /HPF Urine      0 - 1 /HPF  2 (H)   Mucous Urine      NEG:Negative /LPF  Present (A)   Sodium      133 - 144 mmol/L  138   Potassium      3.4 - 5.3 mmol/L  4.3   Chloride      94 - 109 mmol/L  103   Carbon Dioxide      20 - 32 mmol/L  28   Anion Gap      3 - 14 mmol/L  7   Glucose      70 - 99 mg/dL  132 (H)   Urea Nitrogen      7 - 30 mg/dL  14   Creatinine      0.52 - 1.04 mg/dL 0.64 0.61   GFR Estimate      >60 mL/min/1.7m2 >90 >90   GFR Estimate If Black      >60 mL/min/1.7m2 >90 >90   Calcium      8.5 - 10.1 mg/dL  9.2   Bilirubin Total      0.2 - 1.3 mg/dL 0.4 0.4   Albumin      3.4 - 5.0 g/dL 3.1 (L) 2.9 (L)   Protein Total      6.8 - 8.8 g/dL 7.4 7.1   Alkaline Phosphatase      40 - 150 U/L 62 67   ALT      0 - 50 U/L 21 28   AST      0 - 45 U/L 10 11   Bilirubin Direct      0.0 - 0.2 mg/dL 0.1    M Tuberculosis Result      NEG:Negative Negative    M Tuberculosis Antigen Value      IU/mL 0.01    Rheumatoid Factor      <20 IU/mL 51 (H)    Cyclic Citrullinated Peptide Antibody, IgG      <7 U/mL 15 (H)    Sed Rate      0 - 30 mm/h 24 14   CRP Inflammation      0.0 - 8.0 mg/L 14.1 (H) 7.6   HIV Antigen Antibody Combo      NR:Nonreactive      Nonreactive       [facility] - Rheumatology Clinic Visit     Shira Rodriguez MRN# [id number]   YOB: 1960    Primary care provider: Anjel Busby  Oct 16, 2017          Assessment and Plan:     Problem list:    # seropositive non-erosive RA, 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  - BL CTS developed winter 2017, found to have +RF 38/CCP 9 but NO arthritis at that time  - acute onset polyarticular inflammatory arthritis summer 2017  - started on MTX 8/2017  -  no active synovitis on exam today, will continue current plan, if symptoms progress despite monotherapy, would start SSZ +/- HCQ for triple therapy prior to initiating TNF inhib       -- continue MTX 10 tabs weekly + FA 1 mg qd (can increase to 2 mg qd or just around time of weekly medication if needed), limit EtOH to < 2 drinks per week, q3m monitoring labs (due today)       -- decrease prednisone to 2.5 mg qd x1 wk, then off    # R sided CTS  - sx onset 11/2016, EMG 1/2017 c/w severe R side and moderate L side CTS  - s/p R CT inj 1/2017 w/ significant relief  - of note, BL CTS relatively common in RA -> if does not improve with repeat injection, would likely escalate current RA regimen       -- return to ortho for repeat injection     # L heel pain, prior XR showing enthesopathy of [place]'s tendon  - Does not have psoriasis or other features of psoriatic arthritis/spondyloarthropathies at this time        -- podiatry referral - ?inserts, local treatment (injection)    # Osteoarthritis  - in setting of morbid obesity  - chronic BL knee and low back pain  - s/p L TKA and R TAWNY  - s/p R knee CS injection 8/2017 w/o relief        -- consider formal PT referral        -- continue to work on weight loss    # ongoing tobacco abuse  - likely contributing to development of autoimmunity and ongoing systemic inflammation   - has cut from 1 -> 1/2 ppd       -- continue to work on cessation with primary          Other:  # morbid obesity  # low pos Lyme Ab 1.57, confirmatory Western blot IgG/M neg - 12/2016  # numbness BL shins (L onset 2004 following L TKA, R onset 2015 following RLE injury)  # fam hx of thyroid disease ?autoimmune (mother, sister)  # dyshidrotic eczema BL hands  # post-menopausal bleeding       Routine health maintenance:  HBVsAg: neg 4/2017  HBVcore: neg 4/2017  HCV: neg 4/2017  HIV: neg 9/2017  Q gold (or T spot): neg 8/2017   Vitamin D and calcium/bone health: DUE for DEXA scan, do not see that she has  "ever had one  Cancer screenings (skin, breast, colon, pap): due for colonoscopy    Return in about 8 weeks (around 12/11/2017).    Patient was seen and discussed with Dr. Rand.     Rosita Chandra MD  Rheumatology Fellow  Pager [phone]          Active Problem List:     Patient Active Problem List    Diagnosis Date Noted     CYRUS (obstructive sleep apnea) 01/04/2017     Priority: Medium     1/11/2017(401#)-AHI 80, RDI 90, lowest oxygen saturation was 83%, bilevel PAP 19/13 cm/H20 effective.   1/30/2016-AHI 16.6, lowest oxygen saturation was 89%       Elevated blood pressure (not hypertension) 01/04/2017     Priority: Medium     Degeneration of cervical intervertebral disc 12/26/2016     Priority: Medium     Arthritis of knee, degenerative 06/14/2012     Priority: Medium     Morbid obesity (H) 01/11/2012     Priority: Medium     Adjustment disorder with depressed mood 10/27/2011     Priority: Medium     Tobacco use disorder 10/27/2011     Priority: Medium     Primary localized osteoarthrosis, pelvic region and thigh 06/02/2004     Priority: Medium            History of Present Illness:     Chief Complaint   Patient presents with     RECHECK     Arthitis of the knee     Initial rheum HPI 2/20/2017:  Patient is a 57 y/o M/F who presents today for rheumatology referral from Dr. Anjel Busby for bilateral CTS and abnormal RF and CCP.     The patient reports around Thanksgiving (3m ago) she developed pain in both hands (R>>L) that was initially intermittent and switching hands. Pain was not limited to the wrist or small joints in the hand, included \"the entire hand.\" The pain then became constant (R>>L) and making it difficult to do tasks using her hands (for example if used remote then sharp pain in her thumb). She was seen by a chiropracter which did help some, but pain continued. Also wore night wrist splints which she keya made the pain actually worse. Developed numbness/tingling in both hands digits 1-3 " "as well. After visits to her PCP and a neurologist she underwent MRI c-spine which was normal and BL UE EMG's which did confirm moderate median neuropathy on L and severe on R. Throughout this, she has had several episodes of swelling in her entire feet/ankles. During the first episode she was given a medrol dosepak which resolved the swelling in her feet within 1-2d and improved the CTS bilaterally. She had a 2nd episode of foot swelling and her PCP then tried a course of lasix without improvement. She was given a 2nd medrol dosepak again with improvement in foot swelling. During episodes of foot swelling she reports some mild uncomfortable feeling while walking presumably due to the swelling itself, but not actual red/hot/swollen joints. No history of gout.     She was given a R carpal tunnel injection by ortho mid January which resolved the pain in her R hand. She has NO pain, but residual numbness in fingertips 1-3 of both hands which she states \"she can live with.\"     Additional ROS includes fatigue for the past year. She recently was diagnosed with CYRUS and has been trying to wear BIPAP at night but difficult to be compliant (wakes up and she had taken mask off in her sleep). Also notes numbness of BL shins (not circumferential). Numbness of L shin started following her L TKA (was told they may have injected the nerve with marcaine following surgery years ago). Numbness in her R shin started about 1.5y ago following a fall where she developed a large hematoma on R lateral leg that required drainage. She is worried her nerves \"do not heal correctly.\"     She otherwise denies any EMS, pain/swelling/rednesss located in joints. Does have some mild dry mouth, but no dysphagia or difficulty chewing. No dry eyes. See rest of ROS below. Is a smoker x20y and has previously tried to quit (wellbutrin). Has also seen [facility] for weight loss - has lost about 12 lb.     Interim OV 8/28/2017:  For past 1 month has had " polyarticular joint pain with swelling, stiffness. Started with pain/swelling in R knee, mostly posterior knee, along with pain in L heel. Went to see orthopedic physician who told her she had OA and injected R knee with steroid. Pt states steroid did not help as it has previously. Pain then started in BL ankles with swelling, then involved BL wrists/hands (MCP, PIP, thumbs), and BL shoulders. Occasional pain in R hip but thinks this is from walking abnormally due to heel/knee pain. Increased fatigue. Stressed out regarding a potential new job opportunity that she is worried she won't be able to take given the pain. 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 Continues to have CTS symptoms of her R hand, which she had this past winter and resolved with R CT steroid injection. Denies new skin changes/rashes, pleurisy, fam or personal hx of psoriasis or IBD, abd pain.     Interim OV 10/16/2017:  Pt doing much better today than her last visit. I did have a conversation with her in the interim when she had severe polyarticular flare with significant pain in hands/feet/wrists and difficulty walking. I increased her prednisone to high dose burst to 60 mg x3d with rapid taper. Medication helped within a day. I also increased her MTX to 10 tabs weekly which she splits 5/5 in AM/PM. Tolerating meds relatively well. Down to pred 5 mg qd over past few weeks. Biggest complaint today is ongoing numbness/tingling in R hand from CTS and L Achilles tendon heel pain. Minimal to no EMS, no swelling or joint stiffness.            Review of Systems:   Complete ROS negative except for symptoms mentioned in the HPI     No h/o  myalgia, unintentional weight loss, loss of appetite, fevers, swollen glands + fatigue, dyshidrotic eczema BL hands  No h/o gout  No family or personal history of psoriasis, UC or crohn's disease. No h/o iritis. No enthesitis, plantar fascitis. + L Nicole's tendon pain  Patient denies any raynauds, malar rash, photosensitivity,  recurrent mouth/genital ulcers, sicca symptoms (+mild dry mouth), recurrent sinusitis/rhinitis  No h/o recurrent miscarriages or arterial/venous thrombosis in the past  No h/o chest pain + SOB with exertion, chronic cough occasionally productive (no hemoptysis)  No h/o persistent nausea, vomiting, constipation, diarrhea, abdominal pain +2 episodes band-like abdominal pain resolved on own   No h/o hematochezia, hematuria, hemoptysis, hematemesis  No h/o seizures or strokes  No h/o cancer         Past Medical History:     Past Medical History:   Diagnosis Date     Chronic bronchitis (H) 07/30/2015     Contact dermatitis      Depressive disorder 1985     Eczema     HANDS     Elevated liver enzymes      H/O total knee replacement, left      History of total hip replacement 10/27/2011     Hyperlipidemia      Hypertension      Morbid obesity (H)      Osteoarthrosis     right knee     Sleep apnea      Tobacco use disorder      Past Surgical History:   Procedure Laterality Date     ARTHROPLASTY KNEE  6/14/2012    Procedure: ARTHROPLASTY KNEE;  Left Total Knee Arthroplasty;  Surgeon: Annette Quesada MD;  Location: [facility] OR     ARTHROSCOPY HIP Right      C TOTAL HIP ARTHROPLASTY  07/09/04    RT            Social History:     Social History     Occupational History     registered nurse      [employer]     Social History Main Topics     Smoking status: Current Every Day Smoker     Packs/day: 1.00     Years: 33.00     Types: Cigarettes     Start date: 1/1/1982     Smokeless tobacco: Never Used      Comment: maybe     Alcohol use 0.0 oz/week      Comment: occassional     Drug use: No     Sexual activity: No   works as a nurse, currently looking for a job   Still smoking 1/2 ppd         Family History:     Family History   Problem Relation Age of Onset     Thyroid Disease Mother      Hypertension Mother      Skin Cancer Mother      MMohs surgery with skin graft     CEREBROVASCULAR DISEASE Mother      CVA after endarderectomy      DIABETES Mother      Breast Cancer Paternal Grandmother      Pancreatic Cancer Paternal Grandmother      Cardiovascular Paternal Aunt      Cardiovascular Paternal Uncle      Depression Other      Alcoholism Father      Thyroid Disease Sister      Alcoholism Sister      Hypertension Maternal Grandmother      HEART DISEASE Sister      multiple ablations     Alcoholism Sister      Prostate Cancer Paternal Grandfather    Both mother and sister with thyroid disease, she is unsure if this is autoimmune     No fam hx of RA, SLE, type 1 DM         Allergies:     Allergies   Allergen Reactions     Adhesive Tape Blisters     Erythromycin Rash            Medications:     Current Outpatient Prescriptions   Medication Sig Dispense Refill     predniSONE (DELTASONE) 5 MG tablet Take 2.5 mg x2 weeks, then stop 10 tablet 2     cholecalciferol (VITAMIN D3) 41849 UNITS capsule Take 1 capsule (10,000 Units) by mouth daily 30 capsule 0     methotrexate 2.5 MG tablet CHEMO Take 10 tablets (25 mg) by mouth once a week Take 5 in AM, 5 in PM once weekly. Labs in 4 wks. 40 tablet 2     topiramate (TOPAMAX) 25 MG tablet 25mg at bedtime for week 1, 50mg at bedtime for 1 week, and 75mg at bedtime thereafter 90 tablet 2     folic acid (FOLVITE) 1 MG tablet Take 1 tablet (1 mg) by mouth daily 90 tablet 3     predniSONE (DELTASONE) 10 MG tablet Take 30 mg (3 tablets) daily x 3 days, then 20 mg (2 tablets) daily for 3 days (Patient taking differently: Take 20 mg by mouth 2 times daily Take 30 mg (3 tablets) daily x 3 days, then 20 mg (2 tablets) daily for 3 days) 15 tablet 0     HYDROcodone-acetaminophen (NORCO) 5-325 MG per tablet Take 1-2 tabs daily as needed for pain. 40 tablet 0     Ibuprofen (ADVIL PO) Take 400 mg by mouth every 4 hours as needed        meloxicam (MOBIC) 15 MG tablet Take 1 tablet (15 mg) by mouth daily 30 tablet 1     order for DME Equipment being ordered: BIPAP  19/13 CM H20  AIRCURVE 10  AIRFIT F20  # [id number]    DN# [id number]       aspirin 81 MG tablet Take 81 mg by mouth every morning        lisinopril (PRINIVIL/ZESTRIL) 10 MG tablet Take 1 tablet (10 mg) by mouth daily 90 tablet 3     atorvastatin (LIPITOR) 20 MG tablet Take 1 tablet (20 mg) by mouth daily (Patient taking differently: Take 20 mg by mouth every evening ) 90 tablet 3     SERTRALINE HCL PO Take 150 mg by mouth every morning        albuterol (PROAIR HFA/PROVENTIL HFA/VENTOLIN HFA) 108 (90 BASE) MCG/ACT Inhaler Inhale 2 puffs into the lungs every 6 hours as needed for shortness of breath / dyspnea or wheezing 1 Inhaler 0     fluocinonide (LIDEX) 0.05 % ointment Apply topically daily To hands and feet when dermatitis active 60 g 2            Physical Exam:   Blood pressure 142/80, pulse 106, weight (!) 178.6 kg (393 lb 12.8 oz), SpO2 97 %, not currently breastfeeding.  Wt Readings from Last 4 Encounters:   10/16/17 (!) 178.6 kg (393 lb 12.8 oz)   09/15/17 (!) 176.7 kg (389 lb 8 oz)   09/13/17 (!) 176.8 kg (389 lb 12.8 oz)   09/08/17 (!) 176.4 kg (389 lb)     BP Readings from Last 3 Encounters:   10/16/17 120/74   10/16/17 142/80   09/15/17 116/72       Constitutional: well-developed, appearing stated age  Eyes: PERRLA, normal conjunctiva, sclera  ENT: nl external ears, nose, lips. No mucous membrane lesions, normal saliva pool  Neck: no cervical or supraclavicular lymphadenopathy, no parotid swelling, normal palpable thyroid  Resp: wheezing scattered throughout BL lungs, breathing comfortably on room air  CV: RRR, no m/r/g, no LE edema  GI: soft, NT/ND, +BS, no HSM, morbidly obese  : not tested  Lymph: no cervical, supraclavicular or epitrochlear nodes  MSK: All joints including neck, shoulder, elbow, wrist, MCP/PIP/DIP, hip, knee, ankle, and foot MTP/PIP/DIP joints examined and found normal w/o synovitis or deformity other than that listed below:   Mild bogginess of BL wrists, but without tenderness and with full ROM. Able to make full fist, normal  strength.  No active synovitis of MCP, PIP, DIP, elbows, shoulders, knees, ankles, or feet. Negative MTP squeeze. Well-healed scar overlying L knee from prior TKA. Tenderness with bony protrusion overlying L Nicole's tendon base.   No dactylitis,  tenosynovitis, enthesopathy.  Skin: no nail pitting; no nodules  Psych: nl judgement, orientation, memory, affect.  Neuro: CN II-XII grossly intact, moving all extremities equally, normal gait. Normal  strength, BL UE strength 5/5         Data:     Results for orders placed or performed in visit on 09/18/17   CBC with platelets differential   Result Value Ref Range    WBC 8.7 4.0 - 11.0 10e9/L    RBC Count 5.47 (H) 3.8 - 5.2 10e12/L    Hemoglobin 15.8 (H) 11.7 - 15.7 g/dL    Hematocrit 49.1 (H) 35.0 - 47.0 %    MCV 90 78 - 100 fl    MCH 28.9 26.5 - 33.0 pg    MCHC 32.2 31.5 - 36.5 g/dL    RDW 14.6 10.0 - 15.0 %    Platelet Count 260 150 - 450 10e9/L    Diff Method Automated Method     % Neutrophils 85.5 %    % Lymphocytes 9.4 %    % Monocytes 4.3 %    % Eosinophils 0.0 %    % Basophils 0.1 %    % Immature Granulocytes 0.7 %    Nucleated RBCs 0 0 /100    Absolute Neutrophil 7.4 1.6 - 8.3 10e9/L    Absolute Lymphocytes 0.8 0.8 - 5.3 10e9/L    Absolute Monocytes 0.4 0.0 - 1.3 10e9/L    Absolute Eosinophils 0.0 0.0 - 0.7 10e9/L    Absolute Basophils 0.0 0.0 - 0.2 10e9/L    Abs Immature Granulocytes 0.1 0 - 0.4 10e9/L    Absolute Nucleated RBC 0.0    ESR   Result Value Ref Range    Sed Rate 14 0 - 30 mm/h   CRP inflammation   Result Value Ref Range    CRP Inflammation 7.6 0.0 - 8.0 mg/L   HIV Antigen Antibody Combo   Result Value Ref Range    HIV Antigen Antibody Combo Nonreactive NR^Nonreactive       UA with Microscopic   Result Value Ref Range    Color Urine Yellow     Appearance Urine Slightly Cloudy     Glucose Urine Negative NEG^Negative mg/dL    Bilirubin Urine Negative NEG^Negative    Ketones Urine Negative NEG^Negative mg/dL    Specific Gravity Urine 1.024 1.003 - 1.035     Blood Urine Negative NEG^Negative    pH Urine 5.0 5.0 - 7.0 pH    Protein Albumin Urine Negative NEG^Negative mg/dL    Urobilinogen mg/dL 0.0 0.0 - 2.0 mg/dL    Nitrite Urine Negative NEG^Negative    Leukocyte Esterase Urine Negative NEG^Negative    Source Midstream Urine     WBC Urine 1 0 - 2 /HPF    RBC Urine 1 0 - 2 /HPF    Bacteria Urine Few (A) NEG^Negative /HPF    Squamous Epithelial /HPF Urine 2 (H) 0 - 1 /HPF    Mucous Urine Present (A) NEG^Negative /LPF   Hemoglobin A1c   Result Value Ref Range    Hemoglobin A1C 5.8 4.3 - 6.0 %   Parathyroid Hormone Intact   Result Value Ref Range    Parathyroid Hormone Intact 59 12 - 72 pg/mL   Vitamin D Deficiency   Result Value Ref Range    Vitamin D Deficiency screening 7 (L) 20 - 75 ug/L   Comprehensive metabolic panel   Result Value Ref Range    Sodium 138 133 - 144 mmol/L    Potassium 4.3 3.4 - 5.3 mmol/L    Chloride 103 94 - 109 mmol/L    Carbon Dioxide 28 20 - 32 mmol/L    Anion Gap 7 3 - 14 mmol/L    Glucose 132 (H) 70 - 99 mg/dL    Urea Nitrogen 14 7 - 30 mg/dL    Creatinine 0.61 0.52 - 1.04 mg/dL    GFR Estimate >90 >60 mL/min/1.7m2    GFR Estimate If Black >90 >60 mL/min/1.7m2    Calcium 9.2 8.5 - 10.1 mg/dL    Bilirubin Total 0.4 0.2 - 1.3 mg/dL    Albumin 2.9 (L) 3.4 - 5.0 g/dL    Protein Total 7.1 6.8 - 8.8 g/dL    Alkaline Phosphatase 67 40 - 150 U/L    ALT 28 0 - 50 U/L    AST 11 0 - 45 U/L     EMG 1/2017 - severe R side and moderate L side median neuropathy at wrist c/w BL CTS    Recent BMP, CBC normal  TSH 12/2016 nl  UA 2012 nl other than 7 RBC  HCV negative  RF 38, CCP 9  CRP 11.3, ESR 10  Lyme Ab 1.57 (+), confirmatory IgG/IgM neg  EMERY neg    MRI c-spine - mild-mod DDD  XR BL knees 2011 - c/w OA  XR R knee 2017 - tricompartmental OA worse in medial compartment  XR BL hands/feet 8/2017: enthesopathy of L Garden's tendon, mild DJD changes, no e/o erosions     EMG 1/2017:  Results:  Nerve conduction studies:  1. Right median-D2 sensory response is  absent.    2. Left median-D2 sensory response shows moderately slowed CV and  moderately reduced amplitude.    3. Bilateral ulnar-D5 and radial sensory responses were normal.    4. Left median ulnar palmar interlatency difference was  prolonged.    5. Right median-APB motor response showed moderately prolonged  DL, mildly reduced amplitude, and normal CV in the forearm.    6. Left median-APB motor response showed mildly prolonged DL,  normal amplitude, and normal CV in the forearm.    7. Bilateral ulnar-ADM motor responses were normal.    8. Right median-lumbrical compared to ulnar-interossei  interlatency difference was prolonged.     Needle EM. Fibrillation potentials and positive sharp waves were seen in  the right APB muscle.    2. Large amplitude and/or long duration motor unit potentials  (MUP) with increased polyphasia were seen in the bilateral APB  muscles.    3. Rapidly firing MUPs with reduced recruitment were seen in the  bilateral APB muscles.      Interpretation:  This is an abnormal study. There is electrophysiologic evidence  of a severe right-sided and a moderate left-sided median  neuropathy at the wrist (e.g., bilateral carpal tunnel syndrome).  Clinical correlation is recommended.      Rosita Chandra MD    Reviewed all relevant data including labs and imaging.         Rosita Chandra MD

## 2017-10-16 NOTE — NURSING NOTE
"Chief Complaint   Patient presents with     RECHECK     Arthitis of the knee       Initial /80  Pulse 106  Wt (!) 178.6 kg (393 lb 12.8 oz)  SpO2 97%  BMI 63.56 kg/m2 Estimated body mass index is 63.56 kg/(m^2) as calculated from the following:    Height as of 9/15/17: 1.676 m (5' 6\").    Weight as of this encounter: 178.6 kg (393 lb 12.8 oz).  Medication Reconciliation: complete   LEONCIO BRICENO CMA      "

## 2017-10-16 NOTE — PROGRESS NOTES
SUBJECTIVE:    Pt is a 57 year old female with pmh of     Patient Active Problem List   Diagnosis     Primary localized osteoarthrosis, pelvic region and thigh     Adjustment disorder with depressed mood     Tobacco use disorder     Morbid obesity (H)     Arthritis of knee, degenerative     Degeneration of cervical intervertebral disc     CYRUS (obstructive sleep apnea)     Elevated blood pressure (not hypertension)       who is here for evaluation of had concerns including Arthritis.    Here for a few things.  One, stress--medical and work issues very stressful. She will see if can get into see Mindi Lopez again, if not, she'll see Josemanuel Sprague.  Two, RA, working w/ rheum, sx improving on current regimen  Three, for some time left achilles pain, she plans to either see a podiatrist near her home on her own, or, Dr Saxena, she'll look into it on her own she states.  Four, right CTS, she has sx again, had hoped would improve on RA rx, numb/pain, cortisone shot helped, she plans to make f/u appt w/ Dr Saxena on her own  Five, she knows to make own CYRUS appt  Six, no more vag bldg, and u/s ok--is due for [facility] annual, will refer  Seven, bariatrics notes rvwd, she's having a hard time motiviating herself to follow through on wt loss plans  Eight, wants to do FIT instead of colonoscopy, rvwd not as good    Allergies   Allergen Reactions     Adhesive Tape Blisters     Erythromycin Rash           Current Outpatient Prescriptions   Medication Sig Dispense Refill     predniSONE (DELTASONE) 5 MG tablet Take 2.5 mg x2 weeks, then stop 10 tablet 2     cholecalciferol (VITAMIN D3) 50580 UNITS capsule Take 1 capsule (10,000 Units) by mouth daily 30 capsule 0     methotrexate 2.5 MG tablet CHEMO Take 10 tablets (25 mg) by mouth once a week Take 5 in AM, 5 in PM once weekly. Labs in 4 wks. 40 tablet 2     topiramate (TOPAMAX) 25 MG tablet 25mg at bedtime for week 1, 50mg at bedtime for 1 week, and 75mg at bedtime thereafter 90 tablet  2     folic acid (FOLVITE) 1 MG tablet Take 1 tablet (1 mg) by mouth daily 90 tablet 3     predniSONE (DELTASONE) 10 MG tablet Take 30 mg (3 tablets) daily x 3 days, then 20 mg (2 tablets) daily for 3 days (Patient taking differently: Take 20 mg by mouth 2 times daily Take 30 mg (3 tablets) daily x 3 days, then 20 mg (2 tablets) daily for 3 days) 15 tablet 0     HYDROcodone-acetaminophen (NORCO) 5-325 MG per tablet Take 1-2 tabs daily as needed for pain. 40 tablet 0     Ibuprofen (ADVIL PO) Take 400 mg by mouth every 4 hours as needed        meloxicam (MOBIC) 15 MG tablet Take 1 tablet (15 mg) by mouth daily 30 tablet 1     order for DME Equipment being ordered: BIPAP  19/13 CM H20  AIRCURVE 10  AIRFIT F20  # [id number]   DN# [id number]       aspirin 81 MG tablet Take 81 mg by mouth every morning        lisinopril (PRINIVIL/ZESTRIL) 10 MG tablet Take 1 tablet (10 mg) by mouth daily 90 tablet 3     atorvastatin (LIPITOR) 20 MG tablet Take 1 tablet (20 mg) by mouth daily (Patient taking differently: Take 20 mg by mouth every evening ) 90 tablet 3     SERTRALINE HCL PO Take 150 mg by mouth every morning        albuterol (PROAIR HFA/PROVENTIL HFA/VENTOLIN HFA) 108 (90 BASE) MCG/ACT Inhaler Inhale 2 puffs into the lungs every 6 hours as needed for shortness of breath / dyspnea or wheezing 1 Inhaler 0     fluocinonide (LIDEX) 0.05 % ointment Apply topically daily To hands and feet when dermatitis active 60 g 2       Social History   Substance Use Topics     Smoking status: Current Every Day Smoker     Packs/day: 1.00     Years: 33.00     Types: Cigarettes     Start date: 1/1/1982     Smokeless tobacco: Never Used      Comment: maybe     Alcohol use 0.0 oz/week      Comment: occassional             OBJECTIVE:  /74  Pulse 87  GENERAL APPEARANCE: Alert, no acute distress  NEURO: Alert, oriented, speech and mentation normal  PSYCHE: mentation appears normal, affect and mood normal except tearful describing stressors  (medical, job)    ASSESSMENT/PLAN:    RA: per rheum  CTS: she'll set up own Dr Saxena f/u  Left achilles pain: she'll either see Dr Saxena or podiatrist near home  Obese: proceed w/ bariatrics plan  See me in a mo  Stress: see psychology as above  She'll make own CYRUS f/u appt  Gyn to see re: annual  FIT test, flu  Shot ordered  1/2 hr visit over half couns/coord care   She's having a hard time with smoking at home      AMANDA JULIEN MD

## 2017-10-16 NOTE — NURSING NOTE
Chief Complaint   Patient presents with     Arthritis     pt here to follow up on arthritis     Oralia Washburn CMA at 10:15 AM on 10/16/2017.

## 2017-10-18 NOTE — PROGRESS NOTES
[facility] - Rheumatology Clinic Visit     Shira Rodriguez MRN# [id number]   YOB: 1960    Primary care provider: Anjel Busby  Oct 16, 2017          Assessment and Plan:     Problem list:    # seropositive non-erosive RA, dx 8/2017  - BL CTS developed winter 2017, found to have +RF 38/CCP 9 but NO arthritis at that time  - acute onset polyarticular inflammatory arthritis summer 2017  - started on MTX 8/2017  - no active synovitis on exam today, will continue current plan, if symptoms progress despite monotherapy, would start SSZ +/- HCQ for triple therapy prior to initiating TNF inhib       -- continue MTX 10 tabs weekly + FA 1 mg qd (can increase to 2 mg qd or just around time of weekly medication if needed), limit EtOH to < 2 drinks per week, q3m monitoring labs (due today)       -- decrease prednisone to 2.5 mg qd x1 wk, then off    # R sided CTS  - sx onset 11/2016, EMG 1/2017 c/w severe R side and moderate L side CTS  - s/p R CT inj 1/2017 w/ significant relief  - of note, BL CTS relatively common in RA -> if does not improve with repeat injection, would likely escalate current RA regimen       -- return to ortho for repeat injection     # L heel pain, prior XR showing enthesopathy of Kooskia's tendon  - Does not have psoriasis or other features of psoriatic arthritis/spondyloarthropathies at this time        -- podiatry referral - ?inserts, local treatment (injection)    # Osteoarthritis  - in setting of morbid obesity  - chronic BL knee and low back pain  - s/p L TKA and R TAWNY  - s/p R knee CS injection 8/2017 w/o relief        -- consider formal PT referral        -- continue to work on weight loss    # ongoing tobacco abuse  - likely contributing to development of autoimmunity and ongoing systemic inflammation   - has cut from 1 -> 1/2 ppd       -- continue to work on cessation with primary          Other:  # morbid obesity  # low pos Lyme Ab 1.57, confirmatory  Western blot IgG/M neg - 12/2016  # numbness BL shins (L onset 2004 following L TKA, R onset 2015 following RLE injury)  # fam hx of thyroid disease ?autoimmune (mother, sister)  # dyshidrotic eczema BL hands  # post-menopausal bleeding       Routine health maintenance:  HBVsAg: neg 4/2017  HBVcore: neg 4/2017  HCV: neg 4/2017  HIV: neg 9/2017  Q gold (or T spot): neg 8/2017   Vitamin D and calcium/bone health: DUE for DEXA scan, do not see that she has ever had one  Cancer screenings (skin, breast, colon, pap): due for colonoscopy    Return in about 8 weeks (around 12/11/2017).    Patient was seen and discussed with Dr. Rand.     Rosita Chandra MD  Rheumatology Fellow  Pager [phone]          Active Problem List:     Patient Active Problem List    Diagnosis Date Noted     CYRUS (obstructive sleep apnea) 01/04/2017     Priority: Medium     1/11/2017(401#)-AHI 80, RDI 90, lowest oxygen saturation was 83%, bilevel PAP 19/13 cm/H20 effective.   1/30/2016-AHI 16.6, lowest oxygen saturation was 89%       Elevated blood pressure (not hypertension) 01/04/2017     Priority: Medium     Degeneration of cervical intervertebral disc 12/26/2016     Priority: Medium     Arthritis of knee, degenerative 06/14/2012     Priority: Medium     Morbid obesity (H) 01/11/2012     Priority: Medium     Adjustment disorder with depressed mood 10/27/2011     Priority: Medium     Tobacco use disorder 10/27/2011     Priority: Medium     Primary localized osteoarthrosis, pelvic region and thigh 06/02/2004     Priority: Medium            History of Present Illness:     Chief Complaint   Patient presents with     RECHECK     Arthitis of the knee     Initial rheum HPI 2/20/2017:  Patient is a 57 y/o M/F who presents today for rheumatology referral from Dr. Anjel Busby for bilateral CTS and abnormal RF and CCP.     The patient reports around Thanksgiving (3m ago) she developed pain in both hands (R>>L) that was initially intermittent and  "switching hands. Pain was not limited to the wrist or small joints in the hand, included \"the entire hand.\" The pain then became constant (R>>L) and making it difficult to do tasks using her hands (for example if used remote then sharp pain in her thumb). She was seen by a chiropracter which did help some, but pain continued. Also wore night wrist splints which she keya made the pain actually worse. Developed numbness/tingling in both hands digits 1-3 as well. After visits to her PCP and a neurologist she underwent MRI c-spine which was normal and BL UE EMG's which did confirm moderate median neuropathy on L and severe on R. Throughout this, she has had several episodes of swelling in her entire feet/ankles. During the first episode she was given a medrol dosepak which resolved the swelling in her feet within 1-2d and improved the CTS bilaterally. She had a 2nd episode of foot swelling and her PCP then tried a course of lasix without improvement. She was given a 2nd medrol dosepak again with improvement in foot swelling. During episodes of foot swelling she reports some mild uncomfortable feeling while walking presumably due to the swelling itself, but not actual red/hot/swollen joints. No history of gout.     She was given a R carpal tunnel injection by ortho mid January which resolved the pain in her R hand. She has NO pain, but residual numbness in fingertips 1-3 of both hands which she states \"she can live with.\"     Additional ROS includes fatigue for the past year. She recently was diagnosed with CYRUS and has been trying to wear BIPAP at night but difficult to be compliant (wakes up and she had taken mask off in her sleep). Also notes numbness of BL shins (not circumferential). Numbness of L shin started following her L TKA (was told they may have injected the nerve with marcaine following surgery years ago). Numbness in her R shin started about 1.5y ago following a fall where she developed a large hematoma on " "R lateral leg that required drainage. She is worried her nerves \"do not heal correctly.\"     She otherwise denies any EMS, pain/swelling/rednesss located in joints. Does have some mild dry mouth, but no dysphagia or difficulty chewing. No dry eyes. See rest of ROS below. Is a smoker x20y and has previously tried to quit (wellbutrin). Has also seen [employer] for weight loss - has lost about 12 lb.     Interim OV 8/28/2017:  For past 1 month has had polyarticular joint pain with swelling, stiffness. Started with pain/swelling in R knee, mostly posterior knee, along with pain in L heel. Went to see orthopedic physician who told her she had OA and injected R knee with steroid. Pt states steroid did not help as it has previously. Pain then started in BL ankles with swelling, then involved BL wrists/hands (MCP, PIP, thumbs), and BL shoulders. Occasional pain in R hip but thinks this is from walking abnormally due to heel/knee pain. Increased fatigue. Stressed out regarding a potential new job opportunity that she is worried she won't be able to take given the pain. 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 Continues to have CTS symptoms of her R hand, which she had this past winter and resolved with R CT steroid injection. Denies new skin changes/rashes, pleurisy, fam or personal hx of psoriasis or IBD, abd pain.     Interim OV 10/16/2017:  Pt doing much better today than her last visit. I did have a conversation with her in the interim when she had severe polyarticular flare with significant pain in hands/feet/wrists and difficulty walking. I increased her prednisone to high dose burst to 60 mg x3d with rapid taper. Medication helped within a day. I also increased her MTX to 10 tabs weekly which she splits 5/5 in AM/PM. Tolerating meds relatively well. Down to pred 5 mg qd over past few weeks. Biggest complaint today is ongoing numbness/tingling in R hand from CTS and L Achilles tendon heel pain. Minimal to no EMS, no swelling or " joint stiffness.            Review of Systems:   Complete ROS negative except for symptoms mentioned in the HPI     No h/o  myalgia, unintentional weight loss, loss of appetite, fevers, swollen glands + fatigue, dyshidrotic eczema BL hands  No h/o gout  No family or personal history of psoriasis, UC or crohn's disease. No h/o iritis. No enthesitis, plantar fascitis. + L Nicole's tendon pain  Patient denies any raynauds, malar rash, photosensitivity, recurrent mouth/genital ulcers, sicca symptoms (+mild dry mouth), recurrent sinusitis/rhinitis  No h/o recurrent miscarriages or arterial/venous thrombosis in the past  No h/o chest pain + SOB with exertion, chronic cough occasionally productive (no hemoptysis)  No h/o persistent nausea, vomiting, constipation, diarrhea, abdominal pain +2 episodes band-like abdominal pain resolved on own   No h/o hematochezia, hematuria, hemoptysis, hematemesis  No h/o seizures or strokes  No h/o cancer         Past Medical History:     Past Medical History:   Diagnosis Date     Chronic bronchitis (H) 07/30/2015     Contact dermatitis      Depressive disorder 1985     Eczema     HANDS     Elevated liver enzymes      H/O total knee replacement, left      History of total hip replacement 10/27/2011     Hyperlipidemia      Hypertension      Morbid obesity (H)      Osteoarthrosis     right knee     Sleep apnea      Tobacco use disorder      Past Surgical History:   Procedure Laterality Date     ARTHROPLASTY KNEE  6/14/2012    Procedure: ARTHROPLASTY KNEE;  Left Total Knee Arthroplasty;  Surgeon: Annette Quesada MD;  Location: [facility] OR     ARTHROSCOPY HIP Right      C TOTAL HIP ARTHROPLASTY  07/09/04    RT            Social History:     Social History     Occupational History     registered nurse      [employer]     Social History Main Topics     Smoking status: Current Every Day Smoker     Packs/day: 1.00     Years: 33.00     Types: Cigarettes     Start date: 1/1/1982     Smokeless  tobacco: Never Used      Comment: maybe     Alcohol use 0.0 oz/week      Comment: occassional     Drug use: No     Sexual activity: No   works as a nurse, currently looking for a job   Still smoking 1/2 ppd         Family History:     Family History   Problem Relation Age of Onset     Thyroid Disease Mother      Hypertension Mother      Skin Cancer Mother      MMohs surgery with skin graft     CEREBROVASCULAR DISEASE Mother      CVA after endarderectomy     DIABETES Mother      Breast Cancer Paternal Grandmother      Pancreatic Cancer Paternal Grandmother      Cardiovascular Paternal Aunt      Cardiovascular Paternal Uncle      Depression Other      Alcoholism Father      Thyroid Disease Sister      Alcoholism Sister      Hypertension Maternal Grandmother      HEART DISEASE Sister      multiple ablations     Alcoholism Sister      Prostate Cancer Paternal Grandfather    Both mother and sister with thyroid disease, she is unsure if this is autoimmune     No fam hx of RA, SLE, type 1 DM         Allergies:     Allergies   Allergen Reactions     Adhesive Tape Blisters     Erythromycin Rash            Medications:     Current Outpatient Prescriptions   Medication Sig Dispense Refill     predniSONE (DELTASONE) 5 MG tablet Take 2.5 mg x2 weeks, then stop 10 tablet 2     cholecalciferol (VITAMIN D3) 70400 UNITS capsule Take 1 capsule (10,000 Units) by mouth daily 30 capsule 0     methotrexate 2.5 MG tablet CHEMO Take 10 tablets (25 mg) by mouth once a week Take 5 in AM, 5 in PM once weekly. Labs in 4 wks. 40 tablet 2     topiramate (TOPAMAX) 25 MG tablet 25mg at bedtime for week 1, 50mg at bedtime for 1 week, and 75mg at bedtime thereafter 90 tablet 2     folic acid (FOLVITE) 1 MG tablet Take 1 tablet (1 mg) by mouth daily 90 tablet 3     predniSONE (DELTASONE) 10 MG tablet Take 30 mg (3 tablets) daily x 3 days, then 20 mg (2 tablets) daily for 3 days (Patient taking differently: Take 20 mg by mouth 2 times daily Take 30 mg  (3 tablets) daily x 3 days, then 20 mg (2 tablets) daily for 3 days) 15 tablet 0     HYDROcodone-acetaminophen (NORCO) 5-325 MG per tablet Take 1-2 tabs daily as needed for pain. 40 tablet 0     Ibuprofen (ADVIL PO) Take 400 mg by mouth every 4 hours as needed        meloxicam (MOBIC) 15 MG tablet Take 1 tablet (15 mg) by mouth daily 30 tablet 1     order for DME Equipment being ordered: BIPAP  19/13 CM H20  AIRCURVE 10  AIRFIT F20  # [id number]   DN# [id number]       aspirin 81 MG tablet Take 81 mg by mouth every morning        lisinopril (PRINIVIL/ZESTRIL) 10 MG tablet Take 1 tablet (10 mg) by mouth daily 90 tablet 3     atorvastatin (LIPITOR) 20 MG tablet Take 1 tablet (20 mg) by mouth daily (Patient taking differently: Take 20 mg by mouth every evening ) 90 tablet 3     SERTRALINE HCL PO Take 150 mg by mouth every morning        albuterol (PROAIR HFA/PROVENTIL HFA/VENTOLIN HFA) 108 (90 BASE) MCG/ACT Inhaler Inhale 2 puffs into the lungs every 6 hours as needed for shortness of breath / dyspnea or wheezing 1 Inhaler 0     fluocinonide (LIDEX) 0.05 % ointment Apply topically daily To hands and feet when dermatitis active 60 g 2            Physical Exam:   Blood pressure 142/80, pulse 106, weight (!) 178.6 kg (393 lb 12.8 oz), SpO2 97 %, not currently breastfeeding.  Wt Readings from Last 4 Encounters:   10/16/17 (!) 178.6 kg (393 lb 12.8 oz)   09/15/17 (!) 176.7 kg (389 lb 8 oz)   09/13/17 (!) 176.8 kg (389 lb 12.8 oz)   09/08/17 (!) 176.4 kg (389 lb)     BP Readings from Last 3 Encounters:   10/16/17 120/74   10/16/17 142/80   09/15/17 116/72       Constitutional: well-developed, appearing stated age  Eyes: PERRLA, normal conjunctiva, sclera  ENT: nl external ears, nose, lips. No mucous membrane lesions, normal saliva pool  Neck: no cervical or supraclavicular lymphadenopathy, no parotid swelling, normal palpable thyroid  Resp: wheezing scattered throughout BL lungs, breathing comfortably on room air  CV: RRR, no  m/r/g, no LE edema  GI: soft, NT/ND, +BS, no HSM, morbidly obese  : not tested  Lymph: no cervical, supraclavicular or epitrochlear nodes  MSK: All joints including neck, shoulder, elbow, wrist, MCP/PIP/DIP, hip, knee, ankle, and foot MTP/PIP/DIP joints examined and found normal w/o synovitis or deformity other than that listed below:   Mild bogginess of BL wrists, but without tenderness and with full ROM. Able to make full fist, normal  strength. No active synovitis of MCP, PIP, DIP, elbows, shoulders, knees, ankles, or feet. Negative MTP squeeze. Well-healed scar overlying L knee from prior TKA. Tenderness with bony protrusion overlying L Nicole's tendon base.   No dactylitis,  tenosynovitis, enthesopathy.  Skin: no nail pitting; no nodules  Psych: nl judgement, orientation, memory, affect.  Neuro: CN II-XII grossly intact, moving all extremities equally, normal gait. Normal  strength, BL UE strength 5/5         Data:     Results for orders placed or performed in visit on 09/18/17   CBC with platelets differential   Result Value Ref Range    WBC 8.7 4.0 - 11.0 10e9/L    RBC Count 5.47 (H) 3.8 - 5.2 10e12/L    Hemoglobin 15.8 (H) 11.7 - 15.7 g/dL    Hematocrit 49.1 (H) 35.0 - 47.0 %    MCV 90 78 - 100 fl    MCH 28.9 26.5 - 33.0 pg    MCHC 32.2 31.5 - 36.5 g/dL    RDW 14.6 10.0 - 15.0 %    Platelet Count 260 150 - 450 10e9/L    Diff Method Automated Method     % Neutrophils 85.5 %    % Lymphocytes 9.4 %    % Monocytes 4.3 %    % Eosinophils 0.0 %    % Basophils 0.1 %    % Immature Granulocytes 0.7 %    Nucleated RBCs 0 0 /100    Absolute Neutrophil 7.4 1.6 - 8.3 10e9/L    Absolute Lymphocytes 0.8 0.8 - 5.3 10e9/L    Absolute Monocytes 0.4 0.0 - 1.3 10e9/L    Absolute Eosinophils 0.0 0.0 - 0.7 10e9/L    Absolute Basophils 0.0 0.0 - 0.2 10e9/L    Abs Immature Granulocytes 0.1 0 - 0.4 10e9/L    Absolute Nucleated RBC 0.0    ESR   Result Value Ref Range    Sed Rate 14 0 - 30 mm/h   CRP inflammation   Result  Value Ref Range    CRP Inflammation 7.6 0.0 - 8.0 mg/L   HIV Antigen Antibody Combo   Result Value Ref Range    HIV Antigen Antibody Combo Nonreactive NR^Nonreactive       UA with Microscopic   Result Value Ref Range    Color Urine Yellow     Appearance Urine Slightly Cloudy     Glucose Urine Negative NEG^Negative mg/dL    Bilirubin Urine Negative NEG^Negative    Ketones Urine Negative NEG^Negative mg/dL    Specific Gravity Urine 1.024 1.003 - 1.035    Blood Urine Negative NEG^Negative    pH Urine 5.0 5.0 - 7.0 pH    Protein Albumin Urine Negative NEG^Negative mg/dL    Urobilinogen mg/dL 0.0 0.0 - 2.0 mg/dL    Nitrite Urine Negative NEG^Negative    Leukocyte Esterase Urine Negative NEG^Negative    Source Midstream Urine     WBC Urine 1 0 - 2 /HPF    RBC Urine 1 0 - 2 /HPF    Bacteria Urine Few (A) NEG^Negative /HPF    Squamous Epithelial /HPF Urine 2 (H) 0 - 1 /HPF    Mucous Urine Present (A) NEG^Negative /LPF   Hemoglobin A1c   Result Value Ref Range    Hemoglobin A1C 5.8 4.3 - 6.0 %   Parathyroid Hormone Intact   Result Value Ref Range    Parathyroid Hormone Intact 59 12 - 72 pg/mL   Vitamin D Deficiency   Result Value Ref Range    Vitamin D Deficiency screening 7 (L) 20 - 75 ug/L   Comprehensive metabolic panel   Result Value Ref Range    Sodium 138 133 - 144 mmol/L    Potassium 4.3 3.4 - 5.3 mmol/L    Chloride 103 94 - 109 mmol/L    Carbon Dioxide 28 20 - 32 mmol/L    Anion Gap 7 3 - 14 mmol/L    Glucose 132 (H) 70 - 99 mg/dL    Urea Nitrogen 14 7 - 30 mg/dL    Creatinine 0.61 0.52 - 1.04 mg/dL    GFR Estimate >90 >60 mL/min/1.7m2    GFR Estimate If Black >90 >60 mL/min/1.7m2    Calcium 9.2 8.5 - 10.1 mg/dL    Bilirubin Total 0.4 0.2 - 1.3 mg/dL    Albumin 2.9 (L) 3.4 - 5.0 g/dL    Protein Total 7.1 6.8 - 8.8 g/dL    Alkaline Phosphatase 67 40 - 150 U/L    ALT 28 0 - 50 U/L    AST 11 0 - 45 U/L     EMG 1/2017 - severe R side and moderate L side median neuropathy at wrist c/w BL CTS    Recent BMP, CBC normal  TSH  2016 nl  UA  nl other than 7 RBC  HCV negative  RF 38, CCP 9  CRP 11.3, ESR 10  Lyme Ab 1.57 (+), confirmatory IgG/IgM neg  EMERY neg    MRI c-spine - mild-mod DDD  XR BL knees  - c/w OA  XR R knee  - tricompartmental OA worse in medial compartment  XR BL hands/feet 2017: enthesopathy of L Nicole's tendon, mild DJD changes, no e/o erosions     EMG 2017:  Results:  Nerve conduction studies:  1. Right median-D2 sensory response is absent.    2. Left median-D2 sensory response shows moderately slowed CV and  moderately reduced amplitude.    3. Bilateral ulnar-D5 and radial sensory responses were normal.    4. Left median ulnar palmar interlatency difference was  prolonged.    5. Right median-APB motor response showed moderately prolonged  DL, mildly reduced amplitude, and normal CV in the forearm.    6. Left median-APB motor response showed mildly prolonged DL,  normal amplitude, and normal CV in the forearm.    7. Bilateral ulnar-ADM motor responses were normal.    8. Right median-lumbrical compared to ulnar-interossei  interlatency difference was prolonged.     Needle EM. Fibrillation potentials and positive sharp waves were seen in  the right APB muscle.    2. Large amplitude and/or long duration motor unit potentials  (MUP) with increased polyphasia were seen in the bilateral APB  muscles.    3. Rapidly firing MUPs with reduced recruitment were seen in the  bilateral APB muscles.      Interpretation:  This is an abnormal study. There is electrophysiologic evidence  of a severe right-sided and a moderate left-sided median  neuropathy at the wrist (e.g., bilateral carpal tunnel syndrome).  Clinical correlation is recommended.      Rosita Chandra MD    Reviewed all relevant data including labs and imaging.

## 2017-10-19 ENCOUNTER — THERAPY VISIT (OUTPATIENT)
Dept: PHYSICAL THERAPY | Facility: CLINIC | Age: 57
End: 2017-10-19
Payer: COMMERCIAL

## 2017-10-19 DIAGNOSIS — M54.50 MIDLINE LOW BACK PAIN WITHOUT SCIATICA: Primary | ICD-10-CM

## 2017-10-19 PROCEDURE — 97161 PT EVAL LOW COMPLEX 20 MIN: CPT | Mod: GP | Performed by: PHYSICAL THERAPIST

## 2017-10-19 PROCEDURE — 97110 THERAPEUTIC EXERCISES: CPT | Mod: GP | Performed by: PHYSICAL THERAPIST

## 2017-10-19 ASSESSMENT — ACTIVITIES OF DAILY LIVING (ADL)
LIMPING: THE SYMPTOM AFFECTS MY ACTIVITY SEVERELY
PAIN: THE SYMPTOM AFFECTS MY ACTIVITY SEVERELY
WEAKNESS: I DO NOT HAVE THE SYMPTOM
SQUAT: ACTIVITY IS VERY DIFFICULT
HOW_WOULD_YOU_RATE_THE_OVERALL_FUNCTION_OF_YOUR_KNEE_DURING_YOUR_USUAL_DAILY_ACTIVITIES?: ABNORMAL
STIFFNESS: THE SYMPTOM AFFECTS MY ACTIVITY MODERATELY
SIT WITH YOUR KNEE BENT: ACTIVITY IS NOT DIFFICULT
RAW_SCORE: 30
SWELLING: THE SYMPTOM AFFECTS MY ACTIVITY SLIGHTLY
HOW_WOULD_YOU_RATE_THE_CURRENT_FUNCTION_OF_YOUR_KNEE_DURING_YOUR_USUAL_DAILY_ACTIVITIES_ON_A_SCALE_FROM_0_TO_100_WITH_100_BEING_YOUR_LEVEL_OF_KNEE_FUNCTION_PRIOR_TO_YOUR_INJURY_AND_0_BEING_THE_INABILITY_TO_PERFORM_ANY_OF_YOUR_USUAL_DAILY_ACTIVITIES?: 50
GO UP STAIRS: ACTIVITY IS VERY DIFFICULT
KNEE_ACTIVITY_OF_DAILY_LIVING_SCORE: 42.86
STAND: ACTIVITY IS VERY DIFFICULT
AS_A_RESULT_OF_YOUR_KNEE_INJURY,_HOW_WOULD_YOU_RATE_YOUR_CURRENT_LEVEL_OF_DAILY_ACTIVITY?: SEVERELY ABNORMAL
RISE FROM A CHAIR: ACTIVITY IS SOMEWHAT DIFFICULT
KNEEL ON THE FRONT OF YOUR KNEE: I AM UNABLE TO DO THE ACTIVITY
GO DOWN STAIRS: ACTIVITY IS VERY DIFFICULT
GIVING WAY, BUCKLING OR SHIFTING OF KNEE: I DO NOT HAVE THE SYMPTOM
WALK: ACTIVITY IS VERY DIFFICULT
KNEE_ACTIVITY_OF_DAILY_LIVING_SUM: 30
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
## 2017-10-19 NOTE — MR AVS SNAPSHOT
After Visit Summary   10/19/2017    Shira Rodriguez    MRN: [id number]           Patient Information     Date Of Birth          1960        Visit Information        Provider Department      10/19/2017 10:50 AM Gabriel Chamberlain PT [facility]        Today's Diagnoses     Midline low back pain without sciatica    -  1       Follow-ups after your visit        Your next 10 appointments already scheduled     Oct 23, 2017  1:20 PM CDT   [facility]    [address] [place] [id number]   [phone]            Oct 26, 2017 10:50 AM CDT   TOBI Spine with Gabriel Chamberlain PT   [facility] ([facility])    [address]  [facility] [address]   [phone]            Nov 02, 2017 10:50 AM CDT   TOBI Spine with Gabriel Chamberlain PT   [facility] ([facility])    [address]  [facility] [address]   [phone]            Nov 09, 2017 10:50 AM CST   TOBI Spine with Gabriel Chamberlain PT   [facility] ([facility])    [address] [facility] [address]   [phone]            Dec 04, 2017 10:00 AM CST   (Arrive by 9:45 AM)   Return Visit with Rosita Chandra MD   [facility] ([facility])    [address]  [address] [facility] [id number] [phone]            Dec 15, 2017 10:40 AM CST   (Arrive by 10:25 AM)   Return Visit with Jas Dykes MD   [facility] ([facility])    [address]  [address] [facility] [id number] [phone]              Who to contact     If you have questions or need follow up information about today's clinic visit or your schedule please contact [facility] directly at [phone].  Normal or non-critical lab and imaging results will be  communicated to you by [email or web address], letter or phone within 4 business days after the clinic has received the results. If you do not hear from us within 7 days, please contact the clinic through Outline or phone. If you have a critical or abnormal lab result, we will notify you by phone as soon as possible.  Submit refill requests through Outline or call your pharmacy and they will forward the refill request to us. Please allow 3 business days for your refill to be completed.          Additional Information About Your Visit        Outline Information     Outline gives you secure access to your electronic health record. If you see a primary care provider, you can also send messages to your care team and make appointments. If you have questions, please call your primary care clinic.  If you do not have a primary care provider, please call [phone] and they will assist you.        Care EveryWhere ID     This is your Care EveryWhere ID. This could be used by other organizations to access your [place] medical records  [id number]         Blood Pressure from Last 3 Encounters:   10/16/17 120/74   10/16/17 142/80   09/15/17 116/72    Weight from Last 3 Encounters:   10/16/17 (!) 178.6 kg (393 lb 12.8 oz)   09/15/17 (!) 176.7 kg (389 lb 8 oz)   09/13/17 (!) 176.8 kg (389 lb 12.8 oz)              We Performed the Following     HC PT EVAL, LOW COMPLEXITY     TOBI INITIAL EVAL REPORT     THERAPEUTIC EXERCISES          Today's Medication Changes          These changes are accurate as of: 10/19/17 11:45 AM.  If you have any questions, ask your nurse or doctor.               These medicines have changed or have updated prescriptions.        Dose/Directions    atorvastatin 20 MG tablet   Commonly known as:  LIPITOR   This may have changed:  when to take this        Dose:  20 mg   Take 1 tablet (20 mg) by mouth daily   Quantity:  90 tablet   Refills:  3       * predniSONE 10 MG tablet   Commonly known as:  DELTASONE   This may have  changed:    - how much to take  - how to take this  - when to take this  - additional instructions   Used for:  Flare of rheumatoid arthritis (H)        Take 30 mg (3 tablets) daily x 3 days, then 20 mg (2 tablets) daily for 3 days   Quantity:  15 tablet   Refills:  0       * predniSONE 5 MG tablet   Commonly known as:  DELTASONE   This may have changed:  Another medication with the same name was changed. Make sure you understand how and when to take each.   Used for:  Rheumatoid arthritis involving both hands with positive rheumatoid factor (H)        Take 2.5 mg x2 weeks, then stop   Quantity:  10 tablet   Refills:  2       * Notice:  This list has 2 medication(s) that are the same as other medications prescribed for you. Read the directions carefully, and ask your doctor or other care provider to review them with you.             Primary Care Provider Office Phone # Fax #    Anjel Busby [phone]       [address] [address]        Equal Access to Services     VARSHA FLOREZ AH: Hadii dionisio jackman hadasho Soomaali, waaxda luqadaha, qaybta kaalmada adeegyada, andreas herring . So [facility] [phone].    ATENCIÓN: Si ila torres, tiene a samano disposición servicios gratuitos de asistencia lingüística. Llame al [phone].    We comply with applicable federal civil rights laws and [place] laws. We do not discriminate on the basis of race, color, national origin, age, disability, sex, sexual orientation, or gender identity.            Thank you!     Thank you for choosing [facility]  for your care. Our goal is always to provide you with excellent care. Hearing back from our patients is one way we can continue to improve our services. Please take a few minutes to complete the written survey that you may receive in the mail after your visit with us. Thank you!             Your Updated Medication List - Protect others around you: Learn  how to safely use, store and throw away your medicines at www.disposemymeds.org.          This list is accurate as of: 10/19/17 11:45 AM.  Always use your most recent med list.                   Brand Name Dispense Instructions for use Diagnosis    ADVIL PO      Take 400 mg by mouth every 4 hours as needed        albuterol 108 (90 BASE) MCG/ACT Inhaler    PROAIR HFA/PROVENTIL HFA/VENTOLIN HFA    1 Inhaler    Inhale 2 puffs into the lungs every 6 hours as needed for shortness of breath / dyspnea or wheezing    SANTOS (dyspnea on exertion)       aspirin 81 MG tablet      Take 81 mg by mouth every morning        atorvastatin 20 MG tablet    LIPITOR    90 tablet    Take 1 tablet (20 mg) by mouth daily        cholecalciferol 51101 UNITS capsule    vitamin D3    30 capsule    Take 1 capsule (10,000 Units) by mouth daily    Vitamin D deficiency       fluocinonide 0.05 % ointment    LIDEX    60 g    Apply topically daily To hands and feet when dermatitis active    Dermatitis       folic acid 1 MG tablet    FOLVITE    90 tablet    Take 1 tablet (1 mg) by mouth daily    High risk medication use, Rheumatoid arthritis involving multiple sites with positive rheumatoid factor (H)       HYDROcodone-acetaminophen 5-325 MG per tablet    NORCO    40 tablet    Take 1-2 tabs daily as needed for pain.    Chronic pain of right knee       lisinopril 10 MG tablet    PRINIVIL/ZESTRIL    90 tablet    Take 1 tablet (10 mg) by mouth daily    Essential hypertension       meloxicam 15 MG tablet    MOBIC    30 tablet    Take 1 tablet (15 mg) by mouth daily    Bilateral carpal tunnel syndrome       methotrexate 2.5 MG tablet CHEMO     40 tablet    Take 10 tablets (25 mg) by mouth once a week Take 5 in AM, 5 in PM once weekly. Labs in 4 wks.    Rheumatoid arthritis involving multiple sites with positive rheumatoid factor (H)       order for DME      Equipment being ordered: BIPAP 19/13 CM H20 AIRCURVE 10 AIRFIT F20 SN# [id number]   DN# [id number]        *  predniSONE 10 MG tablet    DELTASONE    15 tablet    Take 30 mg (3 tablets) daily x 3 days, then 20 mg (2 tablets) daily for 3 days    Flare of rheumatoid arthritis (H)       * predniSONE 5 MG tablet    DELTASONE    10 tablet    Take 2.5 mg x2 weeks, then stop    Rheumatoid arthritis involving both hands with positive rheumatoid factor (H)       SERTRALINE HCL PO      Take 150 mg by mouth every morning        topiramate 25 MG tablet    TOPAMAX    90 tablet    25mg at bedtime for week 1, 50mg at bedtime for 1 week, and 75mg at bedtime thereafter    Morbid obesity due to excess calories (H)       * Notice:  This list has 2 medication(s) that are the same as other medications prescribed for you. Read the directions carefully, and ask your doctor or other care provider to review them with you.

## 2017-10-23 ENCOUNTER — OFFICE VISIT (OUTPATIENT)
Dept: ORTHOPEDICS | Facility: CLINIC | Age: 57
End: 2017-10-23
Payer: COMMERCIAL

## 2017-10-23 VITALS — DIASTOLIC BLOOD PRESSURE: 89 MMHG | HEART RATE: 102 BPM | OXYGEN SATURATION: 96 % | SYSTOLIC BLOOD PRESSURE: 137 MMHG

## 2017-10-23 DIAGNOSIS — G56.01 CARPAL TUNNEL SYNDROME OF RIGHT WRIST: Primary | ICD-10-CM

## 2017-10-23 PROCEDURE — 76942 ECHO GUIDE FOR BIOPSY: CPT | Performed by: PREVENTIVE MEDICINE

## 2017-10-23 PROCEDURE — 20526 THER INJECTION CARP TUNNEL: CPT | Mod: RT | Performed by: PREVENTIVE MEDICINE

## 2017-10-23 PROCEDURE — 99207 ZZC NO CHARGE LOS: CPT | Performed by: PREVENTIVE MEDICINE

## 2017-10-23 ASSESSMENT — PAIN SCALES - GENERAL: PAINLEVEL: SEVERE PAIN (7)

## 2017-10-23 NOTE — PROGRESS NOTES
"Diagnosis right carpal tunnel syndrome  Patient returns requesting injection for CT as before.     Carpal Tunnel Injection -  The patient was informed of the risks and the benefits of the procedure and a written consent was signed.  The patient s wrist was prepped with chlorhexidine in sterile fashion.   40 mg of depo suspension was drawn up into a 3 mL syringe with 1.0 mL of 1% lidocaine and 1.0 mL of 0.5% marcaine.  Injection was performed using sterile technique.  Under ultrasound guidance a 5/8\" 25-gauge needle was used to enter the wrist at the distal palmar crease medial to the median nerve and inject  There were no complications. The patient tolerated the procedure well. There was negligible bleeding.   The patient was instructed to ice the wrist upon leaving clinic and refrain from overuse over the next 3 days.   The patient was instructed to call or go to the emergency room with any unusual pain, swelling, redness, or if otherwise concerned.  A follow up appointment will be scheduled to evaluate response to the injection, and to assess range of motion and pain.  DEPO-MEDROL NDC# 4204-4139-00 WAS INJECTED     Dr Saxena  "

## 2017-10-23 NOTE — MR AVS SNAPSHOT
After Visit Summary   10/23/2017    Shira Rodriguez    MRN: [id number]           Patient Information     Date Of Birth          1960        Visit Information        Provider Department      10/23/2017 1:20 PM Tashi Saxena MD [facility]        Today's Diagnoses     Carpal tunnel syndrome of right wrist    -  1       Follow-ups after your visit        Your next 10 appointments already scheduled     Oct 26, 2017 10:50 AM CDT   TOBI Spine with Gabriel Chamberlain [facility] ([facility])    [address]  [facility] [address]   [phone]            Nov 02, 2017 10:50 AM CDT   TOBI Spine with Gabriel Chamberlain PT   [facility] ([facility]    [address] [facility] [facility] [address]   [phone]            Nov 09, 2017 10:50 AM CST   TOBI Spine with Gabriel Chamberlain PT   [facility] ([facility])    [address]  [facility] [address]   [phone]            Dec 04, 2017 10:00 AM CST   (Arrive by 9:45 AM)   Return Visit with Rosita Chandra MD   [facility] ([facility] [place])    [address] [facility] [id number] [phone]            Dec 15, 2017 10:40 AM CST   (Arrive by 10:25 AM)   Return Visit with Jas Dykes MD   [facility] ([facility] [place])    [address]  [address] [facility] [id number] [phone]              Who to contact     If you have questions or need follow up information about today's clinic visit or your schedule please contact [facility] directly at [phone].  Normal or non-critical lab and imaging results will be communicated to you by MyChart, letter or phone within 4 business days after the clinic has received the results. If you do not hear from us within 7 days, please contact the clinic through MyChart or phone. If you have  a critical or abnormal lab result, we will notify you by phone as soon as possible.  Submit refill requests through [employer] or call your pharmacy and they will forward the refill request to us. Please allow 3 business days for your refill to be completed.          Additional Information About Your Visit        [employer] Information     [employer] gives you secure access to your electronic health record. If you see a primary care provider, you can also send messages to your care team and make appointments. If you have questions, please call your primary care clinic.  If you do not have a primary care provider, please call [phone] and they will assist you.      [employer] is an electronic gateway that provides easy, online access to your medical records. With [employer], you can request a clinic appointment, read your test results, renew a prescription or communicate with your care team.     To access your existing account, please contact your [facility] or call [phone] for assistance.        Care EveryWhere ID     This is your Care EveryWhere ID. This could be used by other organizations to access your [place] medical records  [id number]        Your Vitals Were     Pulse Pulse Oximetry                102 96%           Blood Pressure from Last 3 Encounters:   10/23/17 137/89   10/16/17 120/74   10/16/17 142/80    Weight from Last 3 Encounters:   10/16/17 (!) 178.6 kg (393 lb 12.8 oz)   09/15/17 (!) 176.7 kg (389 lb 8 oz)   09/13/17 (!) 176.8 kg (389 lb 12.8 oz)              We Performed the Following     DEPO MEDROL 40 MG     INJECTION THERAPEUTIC, CARPAL TUNNEL          Today's Medication Changes          These changes are accurate as of: 10/23/17  1:55 PM.  If you have any questions, ask your nurse or doctor.               These medicines have changed or have updated prescriptions.        Dose/Directions    atorvastatin 20 MG tablet   Commonly known as:  LIPITOR   This may have  changed:  when to take this        Dose:  20 mg   Take 1 tablet (20 mg) by mouth daily   Quantity:  90 tablet   Refills:  3       * predniSONE 10 MG tablet   Commonly known as:  DELTASONE   This may have changed:    - how much to take  - how to take this  - when to take this  - additional instructions   Used for:  Flare of rheumatoid arthritis (H)        Take 30 mg (3 tablets) daily x 3 days, then 20 mg (2 tablets) daily for 3 days   Quantity:  15 tablet   Refills:  0       * predniSONE 5 MG tablet   Commonly known as:  DELTASONE   This may have changed:  Another medication with the same name was changed. Make sure you understand how and when to take each.   Used for:  Rheumatoid arthritis involving both hands with positive rheumatoid factor (H)        Take 2.5 mg x2 weeks, then stop   Quantity:  10 tablet   Refills:  2       * Notice:  This list has 2 medication(s) that are the same as other medications prescribed for you. Read the directions carefully, and ask your doctor or other care provider to review them with you.             Primary Care Provider Office Phone # Fax #    Anjel Busby [phone]       [address] [address]        Equal Access to Services     [facility] Center[facility]UNIER : Hadurvashi bocanegra Soalisha, waodalisda cristina, qaquocta kaalmorris lozano, andreas herring . So [facility] [phone].    ATENCIÓN: Si habla español, tiene a samano disposición servicios gratuitos de asistencia lingüística. [facility] [phone].    We comply with applicable federal civil rights laws and [place] laws. We do not discriminate on the basis of race, color, national origin, age, disability, sex, sexual orientation, or gender identity.            Thank you!     Thank you for choosing [facility]  for your care. Our goal is always to provide you with excellent care. Hearing back from our patients is one way we can continue to improve our services.  Please take a few minutes to complete the written survey that you may receive in the mail after your visit with us. Thank you!             Your Updated Medication List - Protect others around you: Learn how to safely use, store and throw away your medicines at www.disposemymeds.org.          This list is accurate as of: 10/23/17  1:55 PM.  Always use your most recent med list.                   Brand Name Dispense Instructions for use Diagnosis    ADVIL PO      Take 400 mg by mouth every 4 hours as needed        albuterol 108 (90 BASE) MCG/ACT Inhaler    PROAIR HFA/PROVENTIL HFA/VENTOLIN HFA    1 Inhaler    Inhale 2 puffs into the lungs every 6 hours as needed for shortness of breath / dyspnea or wheezing    SANTOS (dyspnea on exertion)       aspirin 81 MG tablet      Take 81 mg by mouth every morning        atorvastatin 20 MG tablet    LIPITOR    90 tablet    Take 1 tablet (20 mg) by mouth daily        cholecalciferol 58695 UNITS capsule    vitamin D3    30 capsule    Take 1 capsule (10,000 Units) by mouth daily    Vitamin D deficiency       fluocinonide 0.05 % ointment    LIDEX    60 g    Apply topically daily To hands and feet when dermatitis active    Dermatitis       folic acid 1 MG tablet    FOLVITE    90 tablet    Take 1 tablet (1 mg) by mouth daily    High risk medication use, Rheumatoid arthritis involving multiple sites with positive rheumatoid factor (H)       HYDROcodone-acetaminophen 5-325 MG per tablet    NORCO    40 tablet    Take 1-2 tabs daily as needed for pain.    Chronic pain of right knee       lisinopril 10 MG tablet    PRINIVIL/ZESTRIL    90 tablet    Take 1 tablet (10 mg) by mouth daily    Essential hypertension       meloxicam 15 MG tablet    MOBIC    30 tablet    Take 1 tablet (15 mg) by mouth daily    Bilateral carpal tunnel syndrome       methotrexate 2.5 MG tablet CHEMO     40 tablet    Take 10 tablets (25 mg) by mouth once a week Take 5 in AM, 5 in PM once weekly. Labs in 4 wks.     Rheumatoid arthritis involving multiple sites with positive rheumatoid factor (H)       order for DME      Equipment being ordered: BIPAP 19/13 CM H20 AIRCURVE 10 AIRFIT F20 # [id number]   DN# [id number]        * predniSONE 10 MG tablet    DELTASONE    15 tablet    Take 30 mg (3 tablets) daily x 3 days, then 20 mg (2 tablets) daily for 3 days    Flare of rheumatoid arthritis (H)       * predniSONE 5 MG tablet    DELTASONE    10 tablet    Take 2.5 mg x2 weeks, then stop    Rheumatoid arthritis involving both hands with positive rheumatoid factor (H)       SERTRALINE HCL PO      Take 150 mg by mouth every morning        topiramate 25 MG tablet    TOPAMAX    90 tablet    25mg at bedtime for week 1, 50mg at bedtime for 1 week, and 75mg at bedtime thereafter    Morbid obesity due to excess calories (H)       * Notice:  This list has 2 medication(s) that are the same as other medications prescribed for you. Read the directions carefully, and ask your doctor or other care provider to review them with you.

## 2017-11-24 DIAGNOSIS — G56.03 BILATERAL CARPAL TUNNEL SYNDROME: ICD-10-CM

## 2017-11-24 RX ORDER — MELOXICAM 15 MG/1
15 TABLET ORAL DAILY
Qty: 30 TABLET | Refills: 1 | Status: SHIPPED | OUTPATIENT
Start: 2017-11-24 | End: 2018-06-04

## 2017-12-04 ENCOUNTER — OFFICE VISIT (OUTPATIENT)
Dept: RHEUMATOLOGY | Facility: CLINIC | Age: 57
End: 2017-12-04
Attending: INTERNAL MEDICINE
Payer: COMMERCIAL

## 2017-12-04 VITALS
OXYGEN SATURATION: 97 % | WEIGHT: 293 LBS | DIASTOLIC BLOOD PRESSURE: 91 MMHG | SYSTOLIC BLOOD PRESSURE: 151 MMHG | HEART RATE: 102 BPM | TEMPERATURE: 98.4 F | BODY MASS INDEX: 47.09 KG/M2 | HEIGHT: 66 IN

## 2017-12-04 DIAGNOSIS — M05.742 RHEUMATOID ARTHRITIS INVOLVING BOTH HANDS WITH POSITIVE RHEUMATOID FACTOR (H): ICD-10-CM

## 2017-12-04 DIAGNOSIS — M05.79 RHEUMATOID ARTHRITIS INVOLVING MULTIPLE SITES WITH POSITIVE RHEUMATOID FACTOR (H): ICD-10-CM

## 2017-12-04 DIAGNOSIS — M05.741 RHEUMATOID ARTHRITIS INVOLVING BOTH HANDS WITH POSITIVE RHEUMATOID FACTOR (H): ICD-10-CM

## 2017-12-04 DIAGNOSIS — Z79.899 HIGH RISK MEDICATION USE: ICD-10-CM

## 2017-12-04 DIAGNOSIS — E55.9 VITAMIN D DEFICIENCY: ICD-10-CM

## 2017-12-04 DIAGNOSIS — Z23 ENCOUNTER FOR IMMUNIZATION: Primary | ICD-10-CM

## 2017-12-04 DIAGNOSIS — M15.0 PRIMARY OSTEOARTHRITIS INVOLVING MULTIPLE JOINTS: ICD-10-CM

## 2017-12-04 LAB
ALT SERPL W P-5'-P-CCNC: 34 U/L (ref 0–50)
AST SERPL W P-5'-P-CCNC: 17 U/L (ref 0–45)
BASOPHILS # BLD AUTO: 0 10E9/L (ref 0–0.2)
BASOPHILS NFR BLD AUTO: 0.5 %
CALCIUM SERPL-MCNC: 9.8 MG/DL (ref 8.5–10.1)
CREAT SERPL-MCNC: 0.58 MG/DL (ref 0.52–1.04)
CRP SERPL-MCNC: 9.5 MG/L (ref 0–8)
DEPRECATED CALCIDIOL+CALCIFEROL SERPL-MC: 6 UG/L (ref 20–75)
DIFFERENTIAL METHOD BLD: ABNORMAL
EOSINOPHIL # BLD AUTO: 0.2 10E9/L (ref 0–0.7)
EOSINOPHIL NFR BLD AUTO: 2.9 %
ERYTHROCYTE [DISTWIDTH] IN BLOOD BY AUTOMATED COUNT: 17.2 % (ref 10–15)
ERYTHROCYTE [SEDIMENTATION RATE] IN BLOOD BY WESTERGREN METHOD: 12 MM/H (ref 0–30)
GFR SERPL CREATININE-BSD FRML MDRD: >90 ML/MIN/1.7M2
HCT VFR BLD AUTO: 49.1 % (ref 35–47)
HGB BLD-MCNC: 16 G/DL (ref 11.7–15.7)
IMM GRANULOCYTES # BLD: 0.1 10E9/L (ref 0–0.4)
IMM GRANULOCYTES NFR BLD: 1.1 %
LYMPHOCYTES # BLD AUTO: 1.6 10E9/L (ref 0.8–5.3)
LYMPHOCYTES NFR BLD AUTO: 21.4 %
MCH RBC QN AUTO: 31.5 PG (ref 26.5–33)
MCHC RBC AUTO-ENTMCNC: 32.6 G/DL (ref 31.5–36.5)
MCV RBC AUTO: 97 FL (ref 78–100)
MONOCYTES # BLD AUTO: 0.5 10E9/L (ref 0–1.3)
MONOCYTES NFR BLD AUTO: 7 %
NEUTROPHILS # BLD AUTO: 5 10E9/L (ref 1.6–8.3)
NEUTROPHILS NFR BLD AUTO: 67.1 %
NRBC # BLD AUTO: 0 10*3/UL
NRBC BLD AUTO-RTO: 0 /100
PLATELET # BLD AUTO: 242 10E9/L (ref 150–450)
PTH-INTACT SERPL-MCNC: 99 PG/ML (ref 12–72)
RBC # BLD AUTO: 5.08 10E12/L (ref 3.8–5.2)
WBC # BLD AUTO: 7.5 10E9/L (ref 4–11)

## 2017-12-04 PROCEDURE — 84450 TRANSFERASE (AST) (SGOT): CPT | Performed by: INTERNAL MEDICINE

## 2017-12-04 PROCEDURE — 85025 COMPLETE CBC W/AUTO DIFF WBC: CPT | Performed by: INTERNAL MEDICINE

## 2017-12-04 PROCEDURE — 86140 C-REACTIVE PROTEIN: CPT | Performed by: INTERNAL MEDICINE

## 2017-12-04 PROCEDURE — 36415 COLL VENOUS BLD VENIPUNCTURE: CPT | Performed by: INTERNAL MEDICINE

## 2017-12-04 PROCEDURE — 84460 ALANINE AMINO (ALT) (SGPT): CPT | Performed by: INTERNAL MEDICINE

## 2017-12-04 PROCEDURE — 90732 PPSV23 VACC 2 YRS+ SUBQ/IM: CPT | Mod: ZF | Performed by: INTERNAL MEDICINE

## 2017-12-04 PROCEDURE — 25000128 H RX IP 250 OP 636: Mod: ZF | Performed by: INTERNAL MEDICINE

## 2017-12-04 PROCEDURE — 82310 ASSAY OF CALCIUM: CPT | Performed by: INTERNAL MEDICINE

## 2017-12-04 PROCEDURE — 83970 ASSAY OF PARATHORMONE: CPT | Performed by: INTERNAL MEDICINE

## 2017-12-04 PROCEDURE — G0009 ADMIN PNEUMOCOCCAL VACCINE: HCPCS

## 2017-12-04 PROCEDURE — 82306 VITAMIN D 25 HYDROXY: CPT | Performed by: INTERNAL MEDICINE

## 2017-12-04 PROCEDURE — 99212 OFFICE O/P EST SF 10 MIN: CPT | Mod: 25,ZF

## 2017-12-04 PROCEDURE — 85652 RBC SED RATE AUTOMATED: CPT | Performed by: INTERNAL MEDICINE

## 2017-12-04 PROCEDURE — 82565 ASSAY OF CREATININE: CPT | Performed by: INTERNAL MEDICINE

## 2017-12-04 RX ORDER — LIDOCAINE HYDROCHLORIDE 10 MG/ML
5 INJECTION, SOLUTION EPIDURAL; INFILTRATION; INTRACAUDAL; PERINEURAL ONCE
Status: DISCONTINUED | OUTPATIENT
Start: 2017-12-04 | End: 2019-10-07

## 2017-12-04 RX ORDER — METHYLPREDNISOLONE ACETATE 40 MG/ML
40 INJECTION, SUSPENSION INTRA-ARTICULAR; INTRALESIONAL; INTRAMUSCULAR; SOFT TISSUE ONCE
Status: DISCONTINUED | OUTPATIENT
Start: 2017-12-04 | End: 2019-10-07

## 2017-12-04 RX ADMIN — PNEUMOCOCCAL VACCINE POLYVALENT 0.5 ML
25; 25; 25; 25; 25; 25; 25; 25; 25; 25; 25; 25; 25; 25; 25; 25; 25; 25; 25; 25; 25; 25; 25 INJECTION, SOLUTION INTRAMUSCULAR; SUBCUTANEOUS at 11:51

## 2017-12-04 ASSESSMENT — PAIN SCALES - GENERAL: PAINLEVEL: MODERATE PAIN (5)

## 2017-12-04 NOTE — MR AVS SNAPSHOT
"              After Visit Summary   12/4/2017    Shira Rodriguez    MRN: [id number]           Patient Information     Date Of Birth          1960        Visit Information        Provider Department      12/4/2017 10:00 AM Rosita Chadnra MD [facility] Rheumatology        Today's Diagnoses     Encounter for immunization    -  1    Rheumatoid arthritis involving both hands with positive rheumatoid factor (H)        Primary osteoarthritis involving multiple joints        High risk medication use           Follow-ups after your visit        Additional Services     PHYSICAL THERAPY REFERRAL       *This therapy referral will be filtered to a centralized scheduling office at [facility] and the patient will receive a call to schedule an appointment at a [place] location most convenient for them. *     [facility] provides Physical Therapy evaluation and treatment and many specialty services across the [place] system.  If requesting a specialty program, please choose from the list below.    If you have not heard from the scheduling office within 2 business days, please call [phone] for all locations, with the exception of [place], please call [phone].  Treatment: Evaluation & Treatment  Special Instructions/Modalities: pool therapy ([facility]) and right knee off loading brace  Special Programs:     Please be aware that coverage of these services is subject to the terms and limitations of your health insurance plan.  Call member services at your health plan with any benefit or coverage questions.      **Note to Provider:  If you are referring outside of [place] for the therapy appointment, please list the name of the location in the \"special instructions\" above, print the referral and give to the patient to schedule the appointment.                  Follow-up notes from your care team     Return in about 3 months (around 3/4/2018).      Your next 10 " "appointments already scheduled     Mar 05, 2018  7:30 AM CST   (Arrive by 7:15 AM)   Return Visit with Rosita Chandra MD   [facility] ([facility] [place])    [address] [address] [phone]              Who to contact     If you have questions or need follow up information about today's clinic visit or your schedule please contact [facility] RHEUMATOLOGY directly at [phone].  Normal or non-critical lab and imaging results will be communicated to you by [employer], letter or phone within 4 business days after the clinic has received the results. If you do not hear from us within 7 days, please contact the clinic through [employer] or phone. If you have a critical or abnormal lab result, we will notify you by phone as soon as possible.  Submit refill requests through [employer] or call your pharmacy and they will forward the refill request to us. Please allow 3 business days for your refill to be completed.          Additional Information About Your Visit        [employer] Information     [employer] gives you secure access to your electronic health record. If you see a primary care provider, you can also send messages to your care team and make appointments. If you have questions, please call your primary care clinic.  If you do not have a primary care provider, please call [phone] and they will assist you.        Care EveryWhere ID     This is your Care EveryWhere ID. This could be used by other organizations to access your [place] medical records  [id number]        Your Vitals Were     Pulse Temperature Height Pulse Oximetry BMI (Body Mass Index)       102 98.4  F (36.9  C) (Oral) 1.676 m (5' 6\") 97% 64.69 kg/m2        Blood Pressure from Last 3 Encounters:   12/04/17 (!) 151/91   10/23/17 137/89   10/16/17 120/74    Weight from Last 3 Encounters:   12/04/17 (!) 181.8 kg (400 lb 12.8 oz)   10/16/17 (!) 178.6 kg (393 lb 12.8 oz)   09/15/17 (!) " 176.7 kg (389 lb 8 oz)              We Performed the Following     PHYSICAL THERAPY REFERRAL          Today's Medication Changes          These changes are accurate as of: 12/4/17 11:59 PM.  If you have any questions, ask your nurse or doctor.               These medicines have changed or have updated prescriptions.        Dose/Directions    atorvastatin 20 MG tablet   Commonly known as:  LIPITOR   This may have changed:  when to take this        Dose:  20 mg   Take 1 tablet (20 mg) by mouth daily   Quantity:  90 tablet   Refills:  3       folic acid 1 MG tablet   Commonly known as:  FOLVITE   This may have changed:  how much to take   Used for:  High risk medication use, Rheumatoid arthritis involving multiple sites with positive rheumatoid factor (H)        Dose:  1 mg   Take 1 tablet (1 mg) by mouth daily   Quantity:  90 tablet   Refills:  3         Stop taking these medicines if you haven't already. Please contact your care team if you have questions.     HYDROcodone-acetaminophen 5-325 MG per tablet   Commonly known as:  NORCO   Stopped by:  Rosita Chandra MD           predniSONE 10 MG tablet   Commonly known as:  DELTASONE   Stopped by:  Rosita Chandra MD           predniSONE 5 MG tablet   Commonly known as:  DELTASONE   Stopped by:  Rosita Chandra MD                    Primary Care Provider Office Phone # Fax #    Anjel Busby [phone]       [address] [address]        Equal Access to Services     TIANA FLOREZ AH: Hadurvashi jackman hadasho Soomaali, waaxda luqadaha, qaybta kaalmada adeegyada, andreas tello. So [facility] [phone].    ATENCIÓN: Si habla español, tiene a samano disposición servicios gratuitos de asistencia lingüística. Llame al [phone].    We comply with applicable federal civil rights laws and [place] laws. We do not discriminate on the basis of race, color, national origin, age, disability, sex,  sexual orientation, or gender identity.            Thank you!     Thank you for choosing [facility] RHEUMATOLOGY  for your care. Our goal is always to provide you with excellent care. Hearing back from our patients is one way we can continue to improve our services. Please take a few minutes to complete the written survey that you may receive in the mail after your visit with us. Thank you!             Your Updated Medication List - Protect others around you: Learn how to safely use, store and throw away your medicines at www.disposemymeds.org.          This list is accurate as of: 12/4/17 11:59 PM.  Always use your most recent med list.                   Brand Name Dispense Instructions for use Diagnosis    ADVIL PO      Take 400 mg by mouth every 4 hours as needed        albuterol 108 (90 BASE) MCG/ACT Inhaler    PROAIR HFA/PROVENTIL HFA/VENTOLIN HFA    1 Inhaler    Inhale 2 puffs into the lungs every 6 hours as needed for shortness of breath / dyspnea or wheezing    SANTOS (dyspnea on exertion)       aspirin 81 MG tablet      Take 81 mg by mouth every morning        atorvastatin 20 MG tablet    LIPITOR    90 tablet    Take 1 tablet (20 mg) by mouth daily        cholecalciferol 00252 UNITS capsule    vitamin D3    30 capsule    Take 1 capsule (10,000 Units) by mouth daily    Vitamin D deficiency       fluocinonide 0.05 % ointment    LIDEX    60 g    Apply topically daily To hands and feet when dermatitis active    Dermatitis       folic acid 1 MG tablet    FOLVITE    90 tablet    Take 1 tablet (1 mg) by mouth daily    High risk medication use, Rheumatoid arthritis involving multiple sites with positive rheumatoid factor (H)       lisinopril 10 MG tablet    PRINIVIL/ZESTRIL    90 tablet    Take 1 tablet (10 mg) by mouth daily    Essential hypertension       * meloxicam 15 MG tablet    MOBIC    30 tablet    Take 1 tablet (15 mg) by mouth daily    Bilateral carpal tunnel syndrome       * meloxicam 15 MG tablet    MOBIC     30 tablet    Take 1 tablet (15 mg) by mouth daily    Bilateral carpal tunnel syndrome       methotrexate 2.5 MG tablet CHEMO     40 tablet    Take 10 tablets (25 mg) by mouth once a week Take 5 in AM, 5 in PM once weekly. Labs in 4 wks.    Rheumatoid arthritis involving multiple sites with positive rheumatoid factor (H)       order for DME      Equipment being ordered: BIPAP 19/13 CM H20 AIRCURVE 10 AIRFIT F20 # [id number]   DN# [id number]        SERTRALINE HCL PO      Take 200 mg by mouth every morning        topiramate 25 MG tablet    TOPAMAX    90 tablet    25mg at bedtime for week 1, 50mg at bedtime for 1 week, and 75mg at bedtime thereafter    Morbid obesity due to excess calories (H)       * Notice:  This list has 2 medication(s) that are the same as other medications prescribed for you. Read the directions carefully, and ask your doctor or other care provider to review them with you.

## 2017-12-04 NOTE — LETTER
12/4/2017      RE: Shira Rodriguez  [address] [facility] [address]       [facility] - Rheumatology Clinic Visit     Shira Rodriguez MRN# [id number]   YOB: 1960    Primary care provider: Anjel Busby  Dec 4, 2017          Assessment and Plan:     Problem list:    # seropositive non-erosive RA, dx 8/2017  - BL CTS developed winter 2017, found to have +RF 38/CCP 9 but NO arthritis at that time  - acute onset polyarticular inflammatory arthritis summer 2017, w/ elevated inflammatory markers  - started on MTX 8/2017    Status: RA under good control today, no evidence of active synovitis on exam.        -- continue MTX 10 tabs weekly + FA 2 mg qd, limit EtOH to < 2 drinks per week, q3m monitoring labs (due today)       -- remain off prednisone       -- given diarrhea around time of MTX use -> discussed she can increase FA to 2 mg daily and up to 3-4 mg the day before/day of/day after MTX (Mondays). Discussed alternative options including adding leucovorin, decreasing dose of MTX to 8 tabs weekly (and possibly adding SSZ or HCQ if needed to offest this reduction in MTX), changing MTX to subcu form, etc    # R sided CTS  - sx onset 11/2016, EMG 1/2017 c/w severe R side and moderate L side CTS  - s/p R CT inj 1/2017 w/ significant relief, repeat injection 10/2017 again with good benefit (some residual numbness of R hand distal fingertips)    # L heel pain, prior XR showing enthesopathy of [place]'s tendon  - Does not have psoriasis or other features of psoriatic arthritis/spondyloarthropathies at this time        -- has seen podiatry, they placed in her boot. May benefit from shoe insert, injection.     # Osteoarthritis  - in setting of morbid obesity  - chronic BL knee and low back pain  - s/p L TKA and R TAWNY  - s/p R knee CS injection 8/2017 w/o relief     Status: ongoing severe R knee pain, likely worsened in setting of wearing L foot boot and having to use crutches         -- right knee CS injection today, can repeat q3m if needed        -- [facility] therapy referral to [facility]       -- consider formal PT (insurance typically won't cover both pool therapy and formal PT at same time)       -- offloading brace for right knee        -- has orthopedic surgeon, no surgery offered until she loses further weight     # ongoing tobacco abuse  - likely contributing to development of autoimmunity and ongoing systemic inflammation   - has cut from 1 -> 1/2 ppd       -- continue to work on cessation with primary care and mental health         Other:  # morbid obesity - has met with bariatric surgery, not yet ready for surgery  # low pos Lyme Ab 1.57, confirmatory Western blot IgG/M neg - 12/2016  # numbness BL shins (L onset 2004 following L TKA, R onset 2015 following RLE injury)  # fam hx of thyroid disease ?autoimmune (mother, sister)  # dyshidrotic eczema BL hands  # post-menopausal bleeding     Routine health maintenance:  HBVsAg: neg 4/2017  HBVcore: neg 4/2017  HCV: neg 4/2017  HIV: neg 9/2017  Q gold (or T spot): neg 8/2017   Vitamin D and calcium/bone health: DUE for DEXA scan, she would like to hold off on this today  Immunizations: UTD flu, prevnar 13 8/2017, PPSV23 today 12/2017  Cancer screenings (skin, breast, colon, pap): due for colonoscopy    Return in about 3 months (around 3/4/2018).    Patient was seen and discussed with Dr. Renteria.     Rosita Chandra MD  Rheumatology Fellow  Pager [phone]          Active Problem List:     Patient Active Problem List    Diagnosis Date Noted     Midline low back pain without sciatica 10/19/2017     Priority: Medium     CYRUS (obstructive sleep apnea) 01/04/2017     Priority: Medium     1/11/2017(401#)-AHI 80, RDI 90, lowest oxygen saturation was 83%, bilevel PAP 19/13 cm/H20 effective.   1/30/2016-AHI 16.6, lowest oxygen saturation was 89%       Elevated blood pressure (not hypertension) 01/04/2017     Priority: Medium     Degeneration  "of cervical intervertebral disc 12/26/2016     Priority: Medium     Arthritis of knee, degenerative 06/14/2012     Priority: Medium     Morbid obesity (H) 01/11/2012     Priority: Medium     Adjustment disorder with depressed mood 10/27/2011     Priority: Medium     Tobacco use disorder 10/27/2011     Priority: Medium     Primary localized osteoarthrosis, pelvic region and thigh 06/02/2004     Priority: Medium            History of Present Illness:     Chief Complaint   Patient presents with     RECHECK     8 wk follow up     Initial rheum HPI 2/20/2017:  Patient is a 57 y/o M/F who presents today for rheumatology referral from Dr. Anjel Busby for bilateral CTS and abnormal RF and CCP.     The patient reports around Thanksgiving (3m ago) she developed pain in both hands (R>>L) that was initially intermittent and switching hands. Pain was not limited to the wrist or small joints in the hand, included \"the entire hand.\" The pain then became constant (R>>L) and making it difficult to do tasks using her hands (for example if used remote then sharp pain in her thumb). She was seen by a chiropracter which did help some, but pain continued. Also wore night wrist splints which she keya made the pain actually worse. Developed numbness/tingling in both hands digits 1-3 as well. After visits to her PCP and a neurologist she underwent MRI c-spine which was normal and BL UE EMG's which did confirm moderate median neuropathy on L and severe on R. Throughout this, she has had several episodes of swelling in her entire feet/ankles. During the first episode she was given a medrol dosepak which resolved the swelling in her feet within 1-2d and improved the CTS bilaterally. She had a 2nd episode of foot swelling and her PCP then tried a course of lasix without improvement. She was given a 2nd medrol dosepak again with improvement in foot swelling. During episodes of foot swelling she reports some mild uncomfortable feeling while " "walking presumably due to the swelling itself, but not actual red/hot/swollen joints. No history of gout.     She was given a R carpal tunnel injection by ortho mid January which resolved the pain in her R hand. She has NO pain, but residual numbness in fingertips 1-3 of both hands which she states \"she can live with.\"     Additional ROS includes fatigue for the past year. She recently was diagnosed with CYRUS and has been trying to wear BIPAP at night but difficult to be compliant (wakes up and she had taken mask off in her sleep). Also notes numbness of BL shins (not circumferential). Numbness of L shin started following her L TKA (was told they may have injected the nerve with marcaine following surgery years ago). Numbness in her R shin started about 1.5y ago following a fall where she developed a large hematoma on R lateral leg that required drainage. She is worried her nerves \"do not heal correctly.\"     She otherwise denies any EMS, pain/swelling/rednesss located in joints. Does have some mild dry mouth, but no dysphagia or difficulty chewing. No dry eyes. See rest of ROS below. Is a smoker x20y and has previously tried to quit (wellbutrin). Has also seen [facility] for weight loss - has lost about 12 lb.     Interim OV 8/28/2017:  For past 1 month has had polyarticular joint pain with swelling, stiffness. Started with pain/swelling in R knee, mostly posterior knee, along with pain in L heel. Went to see orthopedic physician who told her she had OA and injected R knee with steroid. Pt states steroid did not help as it has previously. Pain then started in BL ankles with swelling, then involved BL wrists/hands (MCP, PIP, thumbs), and BL shoulders. Occasional pain in R hip but thinks this is from walking abnormally due to heel/knee pain. Increased fatigue. Stressed out regarding a potential new job opportunity that she is worried she won't be able to take given the pain. 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 Continues to " have CTS symptoms of her R hand, which she had this past winter and resolved with R CT steroid injection. Denies new skin changes/rashes, pleurisy, fam or personal hx of psoriasis or IBD, abd pain.     Interim OV 10/16/2017:  Pt doing much better today than her last visit. I did have a conversation with her in the interim when she had severe polyarticular flare with significant pain in hands/feet/wrists and difficulty walking. I increased her prednisone to high dose burst to 60 mg x3d with rapid taper. Medication helped within a day. I also increased her MTX to 10 tabs weekly which she splits 5/5 in AM/PM. Tolerating meds relatively well. Down to pred 5 mg qd over past few weeks. Biggest complaint today is ongoing numbness/tingling in R hand from CTS and L Achilles tendon heel pain. Minimal to no EMS, no swelling or joint stiffness.     Interim OV 12/4/2017:  Pt not doing well today - is struggling with ongoing pain in her left heel due to enthesopathy of Alexis's tendon. She has seen podiatry for this but they gave her a boot that does not fit properly and is uncomfortable to wear - straps rub on her shin. When she wears boot she has to use crutches - which when used over the last week caused increase wrist pain. Wearing boot also offset her walking (despite right foot shoe insert to equalize height), causing worsening pain in her right knee. She is frustrated as she was told she couldn't have another right knee injection or surgery until she loses more weight. States she went to physical therapy but it was difficult for her to do the exercises due to her weight and she felt it wasn't individualized enough. Was not given knee brace. Has been thinking about pool therapy. Regarding her RA, she actually feels this is doing quite well. Denies any morning stiffness or new joint swelling. Tolerating MTX 10 tabs weekly (splits 5/5) takes Mondays. Takes folic acid 2 mg day before/day of/day after which alleviated oral  mucositis, although she has now developed diarrhea the day of and day after MTX use. She is worried she may not be absorbing the medication. No alopecia or current oral ulcers. Does feel bloated. Still smoking about pack per week. Now seeing . 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            Review of Systems:   Complete ROS negative except for symptoms mentioned in the HPI     No h/o  myalgia, unintentional weight loss, loss of appetite, fevers, swollen glands + fatigue, dyshidrotic eczema BL hands  No h/o gout  No family or personal history of psoriasis, UC or crohn's disease. No h/o iritis. No plantar fascitis. + L Nicole's tendon pain  Patient denies any raynauds, malar rash, photosensitivity, recurrent mouth/genital ulcers, sicca symptoms (+mild dry mouth), recurrent sinusitis/rhinitis  No h/o recurrent miscarriages or arterial/venous thrombosis in the past  No h/o chest pain + SOB with exertion, chronic cough occasionally productive (no hemoptysis)  No h/o persistent nausea, vomiting, constipation, diarrhea, abdominal pain +2 episodes band-like abdominal pain resolved on own   No h/o hematochezia, hematuria, hemoptysis, hematemesis  No h/o seizures or strokes  No h/o cancer         Past Medical History:     Past Medical History:   Diagnosis Date     Chronic bronchitis (H) 07/30/2015     Contact dermatitis      Depressive disorder 1985     Eczema     HANDS     Elevated liver enzymes      H/O total knee replacement, left      History of total hip replacement 10/27/2011     Hyperlipidemia      Hypertension      Morbid obesity (H)      Osteoarthrosis     right knee     Sleep apnea      Tobacco use disorder      Past Surgical History:   Procedure Laterality Date     ARTHROPLASTY KNEE  6/14/2012    Procedure: ARTHROPLASTY KNEE;  Left Total Knee Arthroplasty;  Surgeon: Annette Quesada MD;  Location:  OR      ARTHROSCOPY HIP Right      C TOTAL HIP ARTHROPLASTY  07/09/04    RT            Social History:     Social History     Occupational History     registered nurse      [employer]     Social History Main Topics     Smoking status: Current Every Day Smoker     Packs/day: 1.00     Years: 33.00     Types: Cigarettes     Start date: 1/1/1982     Smokeless tobacco: Never Used      Comment: maybe     Alcohol use 0.0 oz/week      Comment: occassional     Drug use: No     Sexual activity: No   works as a nurse, 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   Still smoking, but cut back to about 1 pack per week         Family History:     Family History   Problem Relation Age of Onset     Thyroid Disease Mother      Hypertension Mother      Skin Cancer Mother      MMohs surgery with skin graft     CEREBROVASCULAR DISEASE Mother      CVA after endarderectomy     DIABETES Mother      Breast Cancer Paternal Grandmother      Pancreatic Cancer Paternal Grandmother      Cardiovascular Paternal Aunt      Cardiovascular Paternal Uncle      Depression Other      Alcoholism Father      Thyroid Disease Sister      Alcoholism Sister      Hypertension Maternal Grandmother      HEART DISEASE Sister      multiple ablations     Alcoholism Sister      Prostate Cancer Paternal Grandfather    Both mother and sister with thyroid disease, she is unsure if this is autoimmune     No fam hx of RA, SLE, type 1 DM         Allergies:     Allergies   Allergen Reactions     Adhesive Tape Blisters     Erythromycin Rash            Medications:     Current Outpatient Prescriptions   Medication Sig Dispense Refill     methotrexate 2.5 MG tablet CHEMO Take 10 tablets (25 mg) by mouth once a week Take 5 in AM, 5 in PM once weekly. Labs in 4 wks. 40 tablet 2     folic acid (FOLVITE) 1 MG tablet Take 1 tablet (1 mg) by mouth daily (Patient taking differently: Take 2 mg by mouth daily ) 90 tablet 3     Ibuprofen (ADVIL PO) Take 400 mg by mouth every 4  "hours as needed        meloxicam (MOBIC) 15 MG tablet Take 1 tablet (15 mg) by mouth daily 30 tablet 1     order for DME Equipment being ordered: BIPAP  19/13 CM H20  AIRCURVE 10  AIRFIT F20  # [id number]   DN# [id number]       lisinopril (PRINIVIL/ZESTRIL) 10 MG tablet Take 1 tablet (10 mg) by mouth daily 90 tablet 3     atorvastatin (LIPITOR) 20 MG tablet Take 1 tablet (20 mg) by mouth daily (Patient taking differently: Take 20 mg by mouth every evening ) 90 tablet 3     SERTRALINE HCL PO Take 200 mg by mouth every morning        albuterol (PROAIR HFA/PROVENTIL HFA/VENTOLIN HFA) 108 (90 BASE) MCG/ACT Inhaler Inhale 2 puffs into the lungs every 6 hours as needed for shortness of breath / dyspnea or wheezing 1 Inhaler 0     fluocinonide (LIDEX) 0.05 % ointment Apply topically daily To hands and feet when dermatitis active 60 g 2     meloxicam (MOBIC) 15 MG tablet Take 1 tablet (15 mg) by mouth daily (Patient not taking: Reported on 12/4/2017) 30 tablet 1     cholecalciferol (VITAMIN D3) 24499 UNITS capsule Take 1 capsule (10,000 Units) by mouth daily (Patient not taking: Reported on 12/4/2017) 30 capsule 0     topiramate (TOPAMAX) 25 MG tablet 25mg at bedtime for week 1, 50mg at bedtime for 1 week, and 75mg at bedtime thereafter (Patient not taking: Reported on 12/4/2017) 90 tablet 2     aspirin 81 MG tablet Take 81 mg by mouth every morning               Physical Exam:   Blood pressure (!) 151/91, pulse 102, temperature 98.4  F (36.9  C), temperature source Oral, height 1.676 m (5' 6\"), weight (!) 181.8 kg (400 lb 12.8 oz), SpO2 97 %, not currently breastfeeding.  Wt Readings from Last 4 Encounters:   12/04/17 (!) 181.8 kg (400 lb 12.8 oz)   10/16/17 (!) 178.6 kg (393 lb 12.8 oz)   09/15/17 (!) 176.7 kg (389 lb 8 oz)   09/13/17 (!) 176.8 kg (389 lb 12.8 oz)     BP Readings from Last 3 Encounters:   12/04/17 (!) 151/91   10/23/17 137/89   10/16/17 120/74     Constitutional: well-developed, appearing stated age  Eyes: " PERRLA, normal conjunctiva, sclera  ENT: nl external ears, nose, lips. No mucous membrane lesions, normal saliva pool  Neck: no cervical or supraclavicular lymphadenopathy, no parotid swelling, normal palpable thyroid  Resp: wheezing scattered throughout BL lungs, breathing comfortably on room air  CV: RRR, no m/r/g, no LE edema  GI: soft, NT/ND, +BS, no HSM, morbidly obese  Lymph: no cervical, supraclavicular or epitrochlear nodes  MSK: All joints including neck, shoulder, elbow, wrist, MCP/PIP/DIP, hip, knee, ankle, and foot MTP/PIP/DIP joints examined and found normal w/o synovitis or deformity other than that listed below:   - no active synovitis detected. Able to make full fist, normal  strength, negative prayer sign   - left heel mild swelling and tenderness over base of [place]'s tendon near insertion onto calcaneus   - right knee crepitus with extension/flexion, mild tenderness along joint lines, no effusion and cool to touch     No dactylitis,  tenosynovitis  Skin: no nail pitting; no nodules. Well-healed vertical scar overlying left knee  Psych: nl judgement, orientation, memory, affect.  Neuro: CN II-XII grossly intact, moving all extremities equally, normal gait. BL UE strength 5/5         Data:     Results for orders placed or performed in visit on 10/16/17   CBC with platelets differential **(6 MO)   Result Value Ref Range    WBC 10.2 4.0 - 11.0 10e9/L    RBC Count 5.35 (H) 3.8 - 5.2 10e12/L    Hemoglobin 16.0 (H) 11.7 - 15.7 g/dL    Hematocrit 48.4 (H) 35.0 - 47.0 %    MCV 91 78 - 100 fl    MCH 29.9 26.5 - 33.0 pg    MCHC 33.1 31.5 - 36.5 g/dL    RDW 16.6 (H) 10.0 - 15.0 %    Platelet Count 239 150 - 450 10e9/L    Diff Method Automated Method     % Neutrophils 76.6 %    % Lymphocytes 14.6 %    % Monocytes 6.2 %    % Eosinophils 1.6 %    % Basophils 0.3 %    % Immature Granulocytes 0.7 %    Nucleated RBCs 0 0 /100    Absolute Neutrophil 7.9 1.6 - 8.3 10e9/L    Absolute Lymphocytes 1.5 0.8 - 5.3  10e9/L    Absolute Monocytes 0.6 0.0 - 1.3 10e9/L    Absolute Eosinophils 0.2 0.0 - 0.7 10e9/L    Absolute Basophils 0.0 0.0 - 0.2 10e9/L    Abs Immature Granulocytes 0.1 0 - 0.4 10e9/L    Absolute Nucleated RBC 0.0    ALT   Result Value Ref Range    ALT 32 0 - 50 U/L   AST   Result Value Ref Range    AST 14 0 - 45 U/L   Creatinine   Result Value Ref Range    Creatinine 0.65 0.52 - 1.04 mg/dL    GFR Estimate >90 >60 mL/min/1.7m2    GFR Estimate If Black >90 >60 mL/min/1.7m2   ESR   Result Value Ref Range    Sed Rate 11 0 - 30 mm/h   CRP inflammation   Result Value Ref Range    CRP Inflammation 5.2 0.0 - 8.0 mg/L     EMG 2017 - severe R side and moderate L side median neuropathy at wrist c/w BL CTS    Recent BMP, CBC normal  TSH 2016 nl  UA  nl other than 7 RBC  HCV negative  RF 38, CCP 9  CRP 11.3, ESR 10  Lyme Ab 1.57 (+), confirmatory IgG/IgM neg  EMERY neg    MRI c-spine - mild-mod DDD  XR BL knees  - c/w OA  XR R knee  - tricompartmental OA worse in medial compartment  XR BL hands/feet 2017: enthesopathy of L Nicole's tendon, mild DJD changes, no e/o erosions     EMG 2017:  Results:  Nerve conduction studies:  1. Right median-D2 sensory response is absent.    2. Left median-D2 sensory response shows moderately slowed CV and  moderately reduced amplitude.    3. Bilateral ulnar-D5 and radial sensory responses were normal.    4. Left median ulnar palmar interlatency difference was  prolonged.    5. Right median-APB motor response showed moderately prolonged  DL, mildly reduced amplitude, and normal CV in the forearm.    6. Left median-APB motor response showed mildly prolonged DL,  normal amplitude, and normal CV in the forearm.    7. Bilateral ulnar-ADM motor responses were normal.    8. Right median-lumbrical compared to ulnar-interossei  interlatency difference was prolonged.     Needle EM. Fibrillation potentials and positive sharp waves were seen in  the right APB muscle.    2. Large  amplitude and/or long duration motor unit potentials  (MUP) with increased polyphasia were seen in the bilateral APB  muscles.    3. Rapidly firing MUPs with reduced recruitment were seen in the  bilateral APB muscles.      Interpretation:  This is an abnormal study. There is electrophysiologic evidence  of a severe right-sided and a moderate left-sided median  neuropathy at the wrist (e.g., bilateral carpal tunnel syndrome).  Clinical correlation is recommended.      Rosita Chandra MD    Reviewed all relevant data including labs and imaging.     I saw the patient with the fellow.  My exam and recomendations are as described.      Misael Renteria MD

## 2017-12-04 NOTE — NURSING NOTE
"Chief Complaint   Patient presents with     RECHECK     8 wk follow up       Initial BP (!) 151/91  Pulse 102  Temp 98.4  F (36.9  C) (Oral)  Ht 1.676 m (5' 6\")  Wt (!) 181.8 kg (400 lb 12.8 oz)  SpO2 97%  BMI 64.69 kg/m2 Estimated body mass index is 64.69 kg/(m^2) as calculated from the following:    Height as of this encounter: 1.676 m (5' 6\").    Weight as of this encounter: 181.8 kg (400 lb 12.8 oz).  Medication Reconciliation: complete   LEONCIO BRICENO CMA      "

## 2017-12-05 NOTE — PROGRESS NOTES
[facility] - Rheumatology Clinic Visit     Shira Rodriguez MRN# [id number]   YOB: 1960    Primary care provider: Anjel Busby  Dec 4, 2017          Assessment and Plan:     Problem list:    # seropositive non-erosive RA, dx 8/2017  - BL CTS developed winter 2017, found to have +RF 38/CCP 9 but NO arthritis at that time  - acute onset polyarticular inflammatory arthritis summer 2017, w/ elevated inflammatory markers  - started on MTX 8/2017    Status: RA under good control today, no evidence of active synovitis on exam.        -- continue MTX 10 tabs weekly + FA 2 mg qd, limit EtOH to < 2 drinks per week, q3m monitoring labs (due today)       -- remain off prednisone       -- given diarrhea around time of MTX use -> discussed she can increase FA to 2 mg daily and up to 3-4 mg the day before/day of/day after MTX (Mondays). Discussed alternative options including adding leucovorin, decreasing dose of MTX to 8 tabs weekly (and possibly adding SSZ or HCQ if needed to offest this reduction in MTX), changing MTX to subcu form, etc    # R sided CTS  - sx onset 11/2016, EMG 1/2017 c/w severe R side and moderate L side CTS  - s/p R CT inj 1/2017 w/ significant relief, repeat injection 10/2017 again with good benefit (some residual numbness of R hand distal fingertips)    # L heel pain, prior XR showing enthesopathy of [place]'s tendon  - Does not have psoriasis or other features of psoriatic arthritis/spondyloarthropathies at this time        -- has seen podiatry, they placed in her boot. May benefit from shoe insert, injection.     # Osteoarthritis  - in setting of morbid obesity  - chronic BL knee and low back pain  - s/p L TKA and R TAWNY  - s/p R knee CS injection 8/2017 w/o relief     Status: ongoing severe R knee pain, likely worsened in setting of wearing L foot boot and having to use crutches        -- right knee CS injection today, can repeat q3m if needed        -- pool therapy  referral to [facility]       -- consider formal PT (insurance typically won't cover both pool therapy and formal PT at same time)       -- offloading brace for right knee        -- has orthopedic surgeon, no surgery offered until she loses further weight     # ongoing tobacco abuse  - likely contributing to development of autoimmunity and ongoing systemic inflammation   - has cut from 1 -> 1/2 ppd       -- continue to work on cessation with primary care and mental health         Other:  # morbid obesity - has met with bariatric surgery, not yet ready for surgery  # low pos Lyme Ab 1.57, confirmatory Western blot IgG/M neg - 12/2016  # numbness BL shins (L onset 2004 following L TKA, R onset 2015 following RLE injury)  # fam hx of thyroid disease ?autoimmune (mother, sister)  # dyshidrotic eczema BL hands  # post-menopausal bleeding     Routine health maintenance:  HBVsAg: neg 4/2017  HBVcore: neg 4/2017  HCV: neg 4/2017  HIV: neg 9/2017  Q gold (or T spot): neg 8/2017   Vitamin D and calcium/bone health: DUE for DEXA scan, she would like to hold off on this today  Immunizations: UTD flu, prevnar 13 8/2017, PPSV23 today 12/2017  Cancer screenings (skin, breast, colon, pap): due for colonoscopy    Return in about 3 months (around 3/4/2018).    Patient was seen and discussed with Dr. Renteria.     Rosita Chandra MD  Rheumatology Fellow  Pager [phone]          Active Problem List:     Patient Active Problem List    Diagnosis Date Noted     Midline low back pain without sciatica 10/19/2017     Priority: Medium     CYRUS (obstructive sleep apnea) 01/04/2017     Priority: Medium     1/11/2017(401#)-AHI 80, RDI 90, lowest oxygen saturation was 83%, bilevel PAP 19/13 cm/H20 effective.   1/30/2016-AHI 16.6, lowest oxygen saturation was 89%       Elevated blood pressure (not hypertension) 01/04/2017     Priority: Medium     Degeneration of cervical intervertebral disc 12/26/2016     Priority: Medium     Arthritis of  "knee, degenerative 06/14/2012     Priority: Medium     Morbid obesity (H) 01/11/2012     Priority: Medium     Adjustment disorder with depressed mood 10/27/2011     Priority: Medium     Tobacco use disorder 10/27/2011     Priority: Medium     Primary localized osteoarthrosis, pelvic region and thigh 06/02/2004     Priority: Medium            History of Present Illness:     Chief Complaint   Patient presents with     RECHECK     8 wk follow up     Initial rheum HPI 2/20/2017:  Patient is a 57 y/o M/F who presents today for rheumatology referral from Dr. Anjel Busby for bilateral CTS and abnormal RF and CCP.     The patient reports around Thanksgiving (3m ago) she developed pain in both hands (R>>L) that was initially intermittent and switching hands. Pain was not limited to the wrist or small joints in the hand, included \"the entire hand.\" The pain then became constant (R>>L) and making it difficult to do tasks using her hands (for example if used remote then sharp pain in her thumb). She was seen by a chiropracter which did help some, but pain continued. Also wore night wrist splints which she keya made the pain actually worse. Developed numbness/tingling in both hands digits 1-3 as well. After visits to her PCP and a neurologist she underwent MRI c-spine which was normal and BL UE EMG's which did confirm moderate median neuropathy on L and severe on R. Throughout this, she has had several episodes of swelling in her entire feet/ankles. During the first episode she was given a medrol dosepak which resolved the swelling in her feet within 1-2d and improved the CTS bilaterally. She had a 2nd episode of foot swelling and her PCP then tried a course of lasix without improvement. She was given a 2nd medrol dosepak again with improvement in foot swelling. During episodes of foot swelling she reports some mild uncomfortable feeling while walking presumably due to the swelling itself, but not actual red/hot/swollen " "joints. No history of gout.     She was given a R carpal tunnel injection by ortho mid January which resolved the pain in her R hand. She has NO pain, but residual numbness in fingertips 1-3 of both hands which she states \"she can live with.\"     Additional ROS includes fatigue for the past year. She recently was diagnosed with CYRUS and has been trying to wear BIPAP at night but difficult to be compliant (wakes up and she had taken mask off in her sleep). Also notes numbness of BL shins (not circumferential). Numbness of L shin started following her L TKA (was told they may have injected the nerve with marcaine following surgery years ago). Numbness in her R shin started about 1.5y ago following a fall where she developed a large hematoma on R lateral leg that required drainage. She is worried her nerves \"do not heal correctly.\"     She otherwise denies any EMS, pain/swelling/rednesss located in joints. Does have some mild dry mouth, but no dysphagia or difficulty chewing. No dry eyes. See rest of ROS below. Is a smoker x20y and has previously tried to quit (wellbutrin). Has also seen [employer] for weight loss - has lost about 12 lb.     Interim OV 8/28/2017:  For past 1 month has had polyarticular joint pain with swelling, stiffness. Started with pain/swelling in R knee, mostly posterior knee, along with pain in L heel. Went to see orthopedic physician who told her she had OA and injected R knee with steroid. Pt states steroid did not help as it has previously. Pain then started in BL ankles with swelling, then involved BL wrists/hands (MCP, PIP, thumbs), and BL shoulders. Occasional pain in R hip but thinks this is from walking abnormally due to heel/knee pain. Increased fatigue. Stressed out regarding a potential new job opportunity that she is worried she won't be able to take given the pain. 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 Continues to have CTS symptoms of her R hand, which she had this past winter and resolved " with R CT steroid injection. Denies new skin changes/rashes, pleurisy, fam or personal hx of psoriasis or IBD, abd pain.     Interim OV 10/16/2017:  Pt doing much better today than her last visit. I did have a conversation with her in the interim when she had severe polyarticular flare with significant pain in hands/feet/wrists and difficulty walking. I increased her prednisone to high dose burst to 60 mg x3d with rapid taper. Medication helped within a day. I also increased her MTX to 10 tabs weekly which she splits 5/5 in AM/PM. Tolerating meds relatively well. Down to pred 5 mg qd over past few weeks. Biggest complaint today is ongoing numbness/tingling in R hand from CTS and L Achilles tendon heel pain. Minimal to no EMS, no swelling or joint stiffness.     Interim OV 12/4/2017:  Pt not doing well today - is struggling with ongoing pain in her left heel due to enthesopathy of [place]'s tendon. She has seen podiatry for this but they gave her a boot that does not fit properly and is uncomfortable to wear - straps rub on her shin. When she wears boot she has to use crutches - which when used over the last week caused increase wrist pain. Wearing boot also offset her walking (despite right foot shoe insert to equalize height), causing worsening pain in her right knee. She is frustrated as she was told she couldn't have another right knee injection or surgery until she loses more weight. States she went to physical therapy but it was difficult for her to do the exercises due to her weight and she felt it wasn't individualized enough. Was not given knee brace. Has been thinking about pool therapy. Regarding her RA, she actually feels this is doing quite well. Denies any morning stiffness or new joint swelling. Tolerating MTX 10 tabs weekly (splits 5/5) takes Mondays. Takes folic acid 2 mg day before/day of/day after which alleviated oral mucositis, although she has now developed diarrhea the day of and day after MTX  use. She is worried she may not be absorbing the medication. No alopecia or current oral ulcers. Does feel bloated. Still smoking about pack per week. Now seeing . 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            Review of Systems:   Complete ROS negative except for symptoms mentioned in the HPI     No h/o  myalgia, unintentional weight loss, loss of appetite, fevers, swollen glands + fatigue, dyshidrotic eczema BL hands  No h/o gout  No family or personal history of psoriasis, UC or crohn's disease. No h/o iritis. No plantar fascitis. + L Nicole's tendon pain  Patient denies any raynauds, malar rash, photosensitivity, recurrent mouth/genital ulcers, sicca symptoms (+mild dry mouth), recurrent sinusitis/rhinitis  No h/o recurrent miscarriages or arterial/venous thrombosis in the past  No h/o chest pain + SOB with exertion, chronic cough occasionally productive (no hemoptysis)  No h/o persistent nausea, vomiting, constipation, diarrhea, abdominal pain +2 episodes band-like abdominal pain resolved on own   No h/o hematochezia, hematuria, hemoptysis, hematemesis  No h/o seizures or strokes  No h/o cancer         Past Medical History:     Past Medical History:   Diagnosis Date     Chronic bronchitis (H) 07/30/2015     Contact dermatitis      Depressive disorder 1985     Eczema     HANDS     Elevated liver enzymes      H/O total knee replacement, left      History of total hip replacement 10/27/2011     Hyperlipidemia      Hypertension      Morbid obesity (H)      Osteoarthrosis     right knee     Sleep apnea      Tobacco use disorder      Past Surgical History:   Procedure Laterality Date     ARTHROPLASTY KNEE  6/14/2012    Procedure: ARTHROPLASTY KNEE;  Left Total Knee Arthroplasty;  Surgeon: Annette Quesada MD;  Location: [facility] OR     ARTHROSCOPY HIP Right      C TOTAL HIP ARTHROPLASTY  07/09/04    RT             Social History:     Social History     Occupational History     registered nurse      [employer]     Social History Main Topics     Smoking status: Current Every Day Smoker     Packs/day: 1.00     Years: 33.00     Types: Cigarettes     Start date: 1/1/1982     Smokeless tobacco: Never Used      Comment: maybe     Alcohol use 0.0 oz/week      Comment: occassional     Drug use: No     Sexual activity: No   works as a nurse, 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   Still smoking, but cut back to about 1 pack per week         Family History:     Family History   Problem Relation Age of Onset     Thyroid Disease Mother      Hypertension Mother      Skin Cancer Mother      MMohs surgery with skin graft     CEREBROVASCULAR DISEASE Mother      CVA after endarderectomy     DIABETES Mother      Breast Cancer Paternal Grandmother      Pancreatic Cancer Paternal Grandmother      Cardiovascular Paternal Aunt      Cardiovascular Paternal Uncle      Depression Other      Alcoholism Father      Thyroid Disease Sister      Alcoholism Sister      Hypertension Maternal Grandmother      HEART DISEASE Sister      multiple ablations     Alcoholism Sister      Prostate Cancer Paternal Grandfather    Both mother and sister with thyroid disease, she is unsure if this is autoimmune     No fam hx of RA, SLE, type 1 DM         Allergies:     Allergies   Allergen Reactions     Adhesive Tape Blisters     Erythromycin Rash            Medications:     Current Outpatient Prescriptions   Medication Sig Dispense Refill     methotrexate 2.5 MG tablet CHEMO Take 10 tablets (25 mg) by mouth once a week Take 5 in AM, 5 in PM once weekly. Labs in 4 wks. 40 tablet 2     folic acid (FOLVITE) 1 MG tablet Take 1 tablet (1 mg) by mouth daily (Patient taking differently: Take 2 mg by mouth daily ) 90 tablet 3     Ibuprofen (ADVIL PO) Take 400 mg by mouth every 4 hours as needed        meloxicam (MOBIC) 15 MG tablet Take 1 tablet (15 mg) by  "mouth daily 30 tablet 1     order for DME Equipment being ordered: BIPAP  19/13 CM H20  AIRCURVE 10  AIRFIT F20  SN# [id number]   DN# [id number]       lisinopril (PRINIVIL/ZESTRIL) 10 MG tablet Take 1 tablet (10 mg) by mouth daily 90 tablet 3     atorvastatin (LIPITOR) 20 MG tablet Take 1 tablet (20 mg) by mouth daily (Patient taking differently: Take 20 mg by mouth every evening ) 90 tablet 3     SERTRALINE HCL PO Take 200 mg by mouth every morning        albuterol (PROAIR HFA/PROVENTIL HFA/VENTOLIN HFA) 108 (90 BASE) MCG/ACT Inhaler Inhale 2 puffs into the lungs every 6 hours as needed for shortness of breath / dyspnea or wheezing 1 Inhaler 0     fluocinonide (LIDEX) 0.05 % ointment Apply topically daily To hands and feet when dermatitis active 60 g 2     meloxicam (MOBIC) 15 MG tablet Take 1 tablet (15 mg) by mouth daily (Patient not taking: Reported on 12/4/2017) 30 tablet 1     cholecalciferol (VITAMIN D3) 31409 UNITS capsule Take 1 capsule (10,000 Units) by mouth daily (Patient not taking: Reported on 12/4/2017) 30 capsule 0     topiramate (TOPAMAX) 25 MG tablet 25mg at bedtime for week 1, 50mg at bedtime for 1 week, and 75mg at bedtime thereafter (Patient not taking: Reported on 12/4/2017) 90 tablet 2     aspirin 81 MG tablet Take 81 mg by mouth every morning               Physical Exam:   Blood pressure (!) 151/91, pulse 102, temperature 98.4  F (36.9  C), temperature source Oral, height 1.676 m (5' 6\"), weight (!) 181.8 kg (400 lb 12.8 oz), SpO2 97 %, not currently breastfeeding.  Wt Readings from Last 4 Encounters:   12/04/17 (!) 181.8 kg (400 lb 12.8 oz)   10/16/17 (!) 178.6 kg (393 lb 12.8 oz)   09/15/17 (!) 176.7 kg (389 lb 8 oz)   09/13/17 (!) 176.8 kg (389 lb 12.8 oz)     BP Readings from Last 3 Encounters:   12/04/17 (!) 151/91   10/23/17 137/89   10/16/17 120/74     Constitutional: well-developed, appearing stated age  Eyes: PERRLA, normal conjunctiva, sclera  ENT: nl external ears, nose, lips. No mucous " membrane lesions, normal saliva pool  Neck: no cervical or supraclavicular lymphadenopathy, no parotid swelling, normal palpable thyroid  Resp: wheezing scattered throughout BL lungs, breathing comfortably on room air  CV: RRR, no m/r/g, no LE edema  GI: soft, NT/ND, +BS, no HSM, morbidly obese  Lymph: no cervical, supraclavicular or epitrochlear nodes  MSK: All joints including neck, shoulder, elbow, wrist, MCP/PIP/DIP, hip, knee, ankle, and foot MTP/PIP/DIP joints examined and found normal w/o synovitis or deformity other than that listed below:   - no active synovitis detected. Able to make full fist, normal  strength, negative prayer sign   - left heel mild swelling and tenderness over base of [place]'s tendon near insertion onto calcaneus   - right knee crepitus with extension/flexion, mild tenderness along joint lines, no effusion and cool to touch     No dactylitis,  tenosynovitis  Skin: no nail pitting; no nodules. Well-healed vertical scar overlying left knee  Psych: nl judgement, orientation, memory, affect.  Neuro: CN II-XII grossly intact, moving all extremities equally, normal gait. BL UE strength 5/5         Data:     Results for orders placed or performed in visit on 10/16/17   CBC with platelets differential **(6 MO)   Result Value Ref Range    WBC 10.2 4.0 - 11.0 10e9/L    RBC Count 5.35 (H) 3.8 - 5.2 10e12/L    Hemoglobin 16.0 (H) 11.7 - 15.7 g/dL    Hematocrit 48.4 (H) 35.0 - 47.0 %    MCV 91 78 - 100 fl    MCH 29.9 26.5 - 33.0 pg    MCHC 33.1 31.5 - 36.5 g/dL    RDW 16.6 (H) 10.0 - 15.0 %    Platelet Count 239 150 - 450 10e9/L    Diff Method Automated Method     % Neutrophils 76.6 %    % Lymphocytes 14.6 %    % Monocytes 6.2 %    % Eosinophils 1.6 %    % Basophils 0.3 %    % Immature Granulocytes 0.7 %    Nucleated RBCs 0 0 /100    Absolute Neutrophil 7.9 1.6 - 8.3 10e9/L    Absolute Lymphocytes 1.5 0.8 - 5.3 10e9/L    Absolute Monocytes 0.6 0.0 - 1.3 10e9/L    Absolute Eosinophils 0.2 0.0 -  0.7 10e9/L    Absolute Basophils 0.0 0.0 - 0.2 10e9/L    Abs Immature Granulocytes 0.1 0 - 0.4 10e9/L    Absolute Nucleated RBC 0.0    ALT   Result Value Ref Range    ALT 32 0 - 50 U/L   AST   Result Value Ref Range    AST 14 0 - 45 U/L   Creatinine   Result Value Ref Range    Creatinine 0.65 0.52 - 1.04 mg/dL    GFR Estimate >90 >60 mL/min/1.7m2    GFR Estimate If Black >90 >60 mL/min/1.7m2   ESR   Result Value Ref Range    Sed Rate 11 0 - 30 mm/h   CRP inflammation   Result Value Ref Range    CRP Inflammation 5.2 0.0 - 8.0 mg/L     EMG 2017 - severe R side and moderate L side median neuropathy at wrist c/w BL CTS    Recent BMP, CBC normal  TSH 2016 nl  UA  nl other than 7 RBC  HCV negative  RF 38, CCP 9  CRP 11.3, ESR 10  Lyme Ab 1.57 (+), confirmatory IgG/IgM neg  EMERY neg    MRI c-spine - mild-mod DDD  XR BL knees  - c/w OA  XR R knee  - tricompartmental OA worse in medial compartment  XR BL hands/feet 2017: enthesopathy of L [place]'s tendon, mild DJD changes, no e/o erosions     EMG 2017:  Results:  Nerve conduction studies:  1. Right median-D2 sensory response is absent.    2. Left median-D2 sensory response shows moderately slowed CV and  moderately reduced amplitude.    3. Bilateral ulnar-D5 and radial sensory responses were normal.    4. Left median ulnar palmar interlatency difference was  prolonged.    5. Right median-APB motor response showed moderately prolonged  DL, mildly reduced amplitude, and normal CV in the forearm.    6. Left median-APB motor response showed mildly prolonged DL,  normal amplitude, and normal CV in the forearm.    7. Bilateral ulnar-ADM motor responses were normal.    8. Right median-lumbrical compared to ulnar-interossei  interlatency difference was prolonged.     Needle EM. Fibrillation potentials and positive sharp waves were seen in  the right APB muscle.    2. Large amplitude and/or long duration motor unit potentials  (MUP) with increased polyphasia  were seen in the bilateral APB  muscles.    3. Rapidly firing MUPs with reduced recruitment were seen in the  bilateral APB muscles.      Interpretation:  This is an abnormal study. There is electrophysiologic evidence  of a severe right-sided and a moderate left-sided median  neuropathy at the wrist (e.g., bilateral carpal tunnel syndrome).  Clinical correlation is recommended.      Rosita Chandra MD    Reviewed all relevant data including labs and imaging.     I saw the patient with the fellow.  My exam and recomendations are as described.      Misael Renteria MD

## 2017-12-05 NOTE — PROCEDURES
"Arthrocentesis  Date/Time: December 4, 2017  Performed by: VINOD SHULTZ  Authorized by:   Consent: Verbal consent obtained. Written consent obtained.  Risks and benefits: risks, benefits and alternatives were discussed  Consent given by: patient  Patient understanding: patient states understanding of the procedure being performed  Patient consent: the patient's understanding of the procedure matches consent given  Procedure consent: procedure consent matches procedure scheduled  Relevant documents: relevant documents present and verified  Test results: test results available and properly labeled  Site marked: the operative site was marked  Imaging studies: imaging studies available  Required items: required blood products, implants, devices, and special equipment available  Patient identity confirmed: verbally with patient  Time out: Immediately prior to procedure a \"time out\" was called to verify the correct patient, procedure, equipment, support staff and site/side marked as required.  Indications: right knee pain  Body area: knee  Location details: right  Joint: right knee  Local anesthesia used: yes   Anesthesia:  Local anesthesia used: yes  Local Anesthetic: co-phenylcaine spray   Sedation:  Patient sedated: no  Preparation: Patient was prepped and draped in the usual sterile fashion.  Needle size: 22 G  Ultrasound guidance: yes  Approach: lateral  Depomedrol amount: 40 mg  Lidocaine 1% amount: 3 cc  Patient tolerance: Patient tolerated the procedure well with no immediate complications    Performed under supervision of attending Dr. Renteria.   "

## 2017-12-06 ENCOUNTER — CARE COORDINATION (OUTPATIENT)
Dept: SURGERY | Facility: CLINIC | Age: 57
End: 2017-12-06

## 2017-12-06 DIAGNOSIS — E55.9 VITAMIN D DEFICIENCY: Primary | ICD-10-CM

## 2017-12-06 NOTE — PROGRESS NOTES
Called patient and let her know Vitamin D still very low. Asked if she was taking her 47179 units as prescribed and she has not taking vitamin d [id number] was unaware we sent in RX so called pharamcy and gave them verbal to fill for patient. Also entered test for recheck in 2 mths and patient also notified.

## 2017-12-12 ENCOUNTER — TELEPHONE (OUTPATIENT)
Dept: RHEUMATOLOGY | Facility: CLINIC | Age: 57
End: 2017-12-12

## 2017-12-12 DIAGNOSIS — M17.11 OSTEOARTHRITIS OF RIGHT KNEE, UNSPECIFIED OSTEOARTHRITIS TYPE: Primary | ICD-10-CM

## 2017-12-12 NOTE — TELEPHONE ENCOUNTER
Jessica with [facility] at 8:35am, requesting orders for PT & aquatic therapy for the right knee.    Please fax order to [phone].    Jessica can be called at [phone].

## 2017-12-12 NOTE — TELEPHONE ENCOUNTER
Called and updated Jessica at [facility] that Dr. Chandra will not be able to sign order until Monday when she is in the office.    Jessica understands, she will update Shira.    Ashli Barth RN  Rheumatology Clinic

## 2017-12-14 DIAGNOSIS — M05.79 RHEUMATOID ARTHRITIS INVOLVING MULTIPLE SITES WITH POSITIVE RHEUMATOID FACTOR (H): ICD-10-CM

## 2017-12-18 NOTE — TELEPHONE ENCOUNTER
Last Written Prescription Date:  9/18/17  Last Fill Quantity: 40,   # refills: 2  Last Office Visit: 12/4/17  Future Office visit:  NONE    CBC RESULTS:   Recent Labs   Lab Test  12/04/17   0954   WBC  7.5   RBC  5.08   HGB  16.0*   HCT  49.1*   MCV  97   MCH  31.5   MCHC  32.6   RDW  17.2*   PLT  242       Creatinine   Date Value Ref Range Status   12/04/2017 0.58 0.52 - 1.04 mg/dL Final   ]    Liver Function Studies -   Recent Labs   Lab Test  12/04/17   0954   09/18/17   1644   PROTTOTAL   --    --   7.1   ALBUMIN   --    --   2.9*   BILITOTAL   --    --   0.4   ALKPHOS   --    --   67   AST  17   < >  11   ALT  34   < >  28    < > = values in this interval not displayed.

## 2018-01-04 PROBLEM — M54.50 MIDLINE LOW BACK PAIN WITHOUT SCIATICA: Status: RESOLVED | Noted: 2017-10-19 | Resolved: 2018-01-04

## 2018-01-16 DIAGNOSIS — I10 ESSENTIAL HYPERTENSION: ICD-10-CM

## 2018-01-17 ENCOUNTER — MYC MEDICAL ADVICE (OUTPATIENT)
Dept: CARDIOLOGY | Facility: CLINIC | Age: 58
End: 2018-01-17

## 2018-01-17 RX ORDER — LISINOPRIL 10 MG/1
10 TABLET ORAL DAILY
Qty: 90 TABLET | Refills: 0 | Status: SHIPPED | OUTPATIENT
Start: 2018-01-17 | End: 2018-06-27

## 2018-02-01 DIAGNOSIS — R06.09 DOE (DYSPNEA ON EXERTION): ICD-10-CM

## 2018-02-02 DIAGNOSIS — G56.03 BILATERAL CARPAL TUNNEL SYNDROME: ICD-10-CM

## 2018-02-02 RX ORDER — ALBUTEROL SULFATE 90 UG/1
2 AEROSOL, METERED RESPIRATORY (INHALATION) EVERY 6 HOURS PRN
Qty: 1 INHALER | Refills: 0 | Status: SHIPPED | OUTPATIENT
Start: 2018-02-02 | End: 2019-04-08

## 2018-02-02 RX ORDER — MELOXICAM 15 MG/1
15 TABLET ORAL DAILY
Qty: 30 TABLET | Refills: 1 | Status: SHIPPED | OUTPATIENT
Start: 2018-02-02 | End: 2018-03-05

## 2018-02-02 NOTE — TELEPHONE ENCOUNTER
meloxicam (MOBIC) 15 MG tablet 30 tablet 1 11/24/2017  No   Sig: Take 1 tablet (15 mg) by mouth daily   Patient not taking: Reported on 12/4/2017        Class: E-Prescribe   Route: Oral   Order: [id number]   E-Prescribing Status: Receipt confirmed by pharmacy (11/24/2017  4:30 PM CST)

## 2018-03-05 ENCOUNTER — OFFICE VISIT (OUTPATIENT)
Dept: RHEUMATOLOGY | Facility: CLINIC | Age: 58
End: 2018-03-05
Attending: INTERNAL MEDICINE
Payer: COMMERCIAL

## 2018-03-05 ENCOUNTER — APPOINTMENT (OUTPATIENT)
Dept: TRANSPLANT | Facility: CLINIC | Age: 58
End: 2018-03-05
Attending: INTERNAL MEDICINE
Payer: COMMERCIAL

## 2018-03-05 VITALS
HEART RATE: 102 BPM | HEIGHT: 66 IN | DIASTOLIC BLOOD PRESSURE: 79 MMHG | WEIGHT: 293 LBS | SYSTOLIC BLOOD PRESSURE: 149 MMHG | BODY MASS INDEX: 47.09 KG/M2 | TEMPERATURE: 98.2 F

## 2018-03-05 DIAGNOSIS — T45.1X5A ADVERSE EFFECT OF METHOTREXATE, INITIAL ENCOUNTER: ICD-10-CM

## 2018-03-05 DIAGNOSIS — M05.79 RHEUMATOID ARTHRITIS INVOLVING MULTIPLE SITES WITH POSITIVE RHEUMATOID FACTOR (H): ICD-10-CM

## 2018-03-05 DIAGNOSIS — M05.79 RHEUMATOID ARTHRITIS INVOLVING MULTIPLE SITES WITH POSITIVE RHEUMATOID FACTOR (H): Primary | ICD-10-CM

## 2018-03-05 DIAGNOSIS — E55.9 VITAMIN D DEFICIENCY: ICD-10-CM

## 2018-03-05 DIAGNOSIS — Z79.899 HIGH RISK MEDICATION USE: ICD-10-CM

## 2018-03-05 LAB
ALBUMIN SERPL-MCNC: 3.3 G/DL (ref 3.4–5)
ALT SERPL W P-5'-P-CCNC: 28 U/L (ref 0–50)
AST SERPL W P-5'-P-CCNC: 18 U/L (ref 0–45)
BASOPHILS # BLD AUTO: 0 10E9/L (ref 0–0.2)
BASOPHILS NFR BLD AUTO: 0.4 %
CREAT SERPL-MCNC: 0.52 MG/DL (ref 0.52–1.04)
CRP SERPL-MCNC: 7.1 MG/L (ref 0–8)
DEPRECATED CALCIDIOL+CALCIFEROL SERPL-MC: 41 UG/L (ref 20–75)
DIFFERENTIAL METHOD BLD: ABNORMAL
EOSINOPHIL # BLD AUTO: 0.3 10E9/L (ref 0–0.7)
EOSINOPHIL NFR BLD AUTO: 3.5 %
ERYTHROCYTE [DISTWIDTH] IN BLOOD BY AUTOMATED COUNT: 13.9 % (ref 10–15)
ERYTHROCYTE [SEDIMENTATION RATE] IN BLOOD BY WESTERGREN METHOD: 11 MM/H (ref 0–30)
GFR SERPL CREATININE-BSD FRML MDRD: >90 ML/MIN/1.7M2
HCT VFR BLD AUTO: 48.8 % (ref 35–47)
HGB BLD-MCNC: 16.1 G/DL (ref 11.7–15.7)
IMM GRANULOCYTES # BLD: 0 10E9/L (ref 0–0.4)
IMM GRANULOCYTES NFR BLD: 0.3 %
LYMPHOCYTES # BLD AUTO: 1.3 10E9/L (ref 0.8–5.3)
LYMPHOCYTES NFR BLD AUTO: 18.1 %
MCH RBC QN AUTO: 31.8 PG (ref 26.5–33)
MCHC RBC AUTO-ENTMCNC: 33 G/DL (ref 31.5–36.5)
MCV RBC AUTO: 96 FL (ref 78–100)
MONOCYTES # BLD AUTO: 0.5 10E9/L (ref 0–1.3)
MONOCYTES NFR BLD AUTO: 6.8 %
NEUTROPHILS # BLD AUTO: 5.2 10E9/L (ref 1.6–8.3)
NEUTROPHILS NFR BLD AUTO: 70.9 %
NRBC # BLD AUTO: 0 10*3/UL
NRBC BLD AUTO-RTO: 0 /100
PLATELET # BLD AUTO: 235 10E9/L (ref 150–450)
RBC # BLD AUTO: 5.06 10E12/L (ref 3.8–5.2)
WBC # BLD AUTO: 7.4 10E9/L (ref 4–11)

## 2018-03-05 PROCEDURE — 84460 ALANINE AMINO (ALT) (SGPT): CPT

## 2018-03-05 PROCEDURE — 85025 COMPLETE CBC W/AUTO DIFF WBC: CPT

## 2018-03-05 PROCEDURE — 84450 TRANSFERASE (AST) (SGOT): CPT

## 2018-03-05 PROCEDURE — G0463 HOSPITAL OUTPT CLINIC VISIT: HCPCS | Mod: ZF

## 2018-03-05 PROCEDURE — 82040 ASSAY OF SERUM ALBUMIN: CPT

## 2018-03-05 PROCEDURE — 82565 ASSAY OF CREATININE: CPT

## 2018-03-05 PROCEDURE — 86140 C-REACTIVE PROTEIN: CPT | Performed by: INTERNAL MEDICINE

## 2018-03-05 RX ORDER — METHOTREXATE 25 MG/ML
INJECTION, SOLUTION INTRA-ARTERIAL; INTRAMUSCULAR; INTRAVENOUS
Qty: 4 ML | Refills: 2 | Status: SHIPPED | OUTPATIENT
Start: 2018-03-05 | End: 2018-06-04

## 2018-03-05 RX ORDER — LEUCOVORIN CALCIUM 5 MG/1
5 TABLET ORAL DAILY
Qty: 4 TABLET | Refills: 2 | Status: SHIPPED | OUTPATIENT
Start: 2018-03-05 | End: 2018-06-04

## 2018-03-05 RX ORDER — DULOXETIN HYDROCHLORIDE 60 MG/1
CAPSULE, DELAYED RELEASE ORAL
Refills: 0 | Status: ON HOLD | COMMUNITY
Start: 2018-02-05 | End: 2019-10-11

## 2018-03-05 ASSESSMENT — PAIN SCALES - GENERAL: PAINLEVEL: MODERATE PAIN (4)

## 2018-03-05 NOTE — PROGRESS NOTES
[facility] - Rheumatology Clinic Visit     Shira Rodriguez MRN# [id number]   YOB: 1960    Primary care provider: Anjel Busby  Mar 5, 2018          Assessment and Plan:     Problem list:    # seropositive non-erosive RA  Disease with ongoing mild activity on exam today and by history. Currently on max dose SQ methotrexate and has been approved for Humira but has not yet started.   - continue MTX 10 tabs weekly  - continue FA 3 mg qd + leucovorin 5 mg weekly   - q3 mo monitoring labs  - start Humira 40 mg qow. Met with [facility] clinic RN today for injection teaching. 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 At this point, she does not seem to have active synovitis on exam however, therefore this is less likely     # L heel pain, prior XR showing enthesopathy of Nicole's tendon  - improving. Was possibly enthesitis related to her RA    # Osteoarthritis, s/p L TKA and R TAWNY  - still getting good response from 12/2017 R knee CS injection  - now s/p pool therapy winter 2018, would continue weight loss management strategies  - previously recommended offloading brace for R knee - she has not yet gotten this from     # ongoing tobacco abuse  - likely contributing to development of autoimmunity and ongoing systemic inflammation   - has cut from 1 -> 1/2 ppd       -- continue to work on cessation with primary care and mental health         Other:  # morbid obesity - has met with bariatric surgery, not yet ready for surgery  # low pos Lyme Ab 1.57, confirmatory Western blot IgG/M neg - 12/2016  # numbness BL shins (L onset 2004 following L TKA, R onset 2015 following RLE injury)  # fam hx of thyroid disease  ?autoimmune (mother, sister)  # dyshidrotic eczema BL hands  # post-menopausal bleeding     Routine health maintenance:  HBVsAg: neg 4/2017  HBVcore: neg 4/2017  HCV: neg 4/2017  HIV: neg 9/2017  Q gold (or T spot): neg 8/2017   Vitamin D and calcium/bone health: recommend DEXA scan, has previously wanted to hold off  Immunizations: UTD flu, prevnar 13 8/2017, PPSV23 12/2017  Cancer screenings (skin, breast, colon, pap): due for colonoscopy    RTC in 3 months with me    Patient was seen and discussed with Dr. Austin.     Rosita Chandra MD  Rheumatology Fellow  Pager [phone]          Active Problem List:     Patient Active Problem List    Diagnosis Date Noted     CYRUS (obstructive sleep apnea) 01/04/2017     Priority: Medium     1/11/2017(401#)-AHI 80, RDI 90, lowest oxygen saturation was 83%, bilevel PAP 19/13 cm/H20 effective.   1/30/2016-AHI 16.6, lowest oxygen saturation was 89%       Elevated blood pressure (not hypertension) 01/04/2017     Priority: Medium     Degeneration of cervical intervertebral disc 12/26/2016     Priority: Medium     Arthritis of knee, degenerative 06/14/2012     Priority: Medium     Morbid obesity (H) 01/11/2012     Priority: Medium     Adjustment disorder with depressed mood 10/27/2011     Priority: Medium     Tobacco use disorder 10/27/2011     Priority: Medium     Primary localized osteoarthrosis, pelvic region and thigh 06/02/2004     Priority: Medium            History of Present Illness:     Chief Complaint   Patient presents with     RECHECK     follow up with arthritis, tzimmer cma     Rheumatological history:  Patient is a 57 year old female who was originally referred to rheumatology in 2/2017 by her PCP Dr. Bubsy for 3 months of BL hand pain pain thought consistent with CTS and positive RF/CCP. EMG showed moderate median neuropathy on L and severe on R, MRI c-spine normal. She received R CT injection by sports med with 9 months of relief. She did describe  intermittent episodes of swelling of her feet/ankles around this time period as well that resolved with medrol dose pack. She has a hx of L TKA and R TAWNY. She has chronic numbness of anterior BL shins following L TKA (was told they may have injected the nerve with marcaine around surgery years ago and numbness in R shin started in 2015 following a fall where developed large hematoma). When she was evaluated in 2/2017 she had no inflammatory arthritis symptoms and did not meet diagnosis of RA. We discussed starting plaquenil given it's role in potentially preventing pts with +RF/CCP from developing clinical RA, although she did not want to do this at the time. She was re-evaluated 8/2017 after developing polyarticular inflammatory arthritis of the right knee, ankles, wrists, hands, and shoulders. She was started on MTX, given depomedrol IM injection, oral prednisone, and given R knee CS injection. MTX increased and oral prednisone tapered to off in 9/2017. Had repeat R CT injection in 10/2017 that lasted until 1/2018. R knee significantly improved following CS injection 12/2017. Has persistent pain in L heel with enthesopathy on imaging, unclear if had true enthesitis in this region due to her RA. Participated in pool therapy winter 2018 with good response.     Last OV: 12/4/2017    Today's visit:  Ms. Rodriguez reports she has ongoing numbness/tingling in right hand that bothers her intermittently throughout the day and throughout night. Back to working temp job, mostly using computer and hand actually doesn't bother her when typing, only when trying to write. She has not yet seen sports clinic back for repeat CT injection. She is worried that too many injections will worsen her arthritis. She has never had discussion about CT release. She reports ongoing stiffness in both hands that lasts all day. No significant stiffness just in the morning. Doesn't feel she has new deformities of joints and no joints that are  "particularly swollen today. R knee still feeling good after the 12/2017 injection. Pain in L heel has improved some. Enjoyed pool therapy this winter. Has not yet started the Humira that she has been approved for. She did not call to arrange delivery. Continues on MTX weekly and now tolerating much better with addition of weekly leucovorin and folic acid 3 mg daily. Diarrhea, oral ulcers, tongue discomfort resolved. Does have really dry mouth that is bothersome - makes it difficult to swallow at times - thinks she \"forgets to swallow.\" She is wondering if this is due to her medications.            Review of Systems:   Complete ROS negative except for symptoms mentioned in the HPI     No h/o  myalgia, unintentional weight loss, loss of appetite, fevers, swollen glands + fatigue, dyshidrotic eczema BL hands  No h/o gout  No family or personal history of psoriasis, UC or crohn's disease. No h/o iritis. No plantar fascitis. + L Skidmore's tendon pain  Patient denies any raynauds, malar rash, photosensitivity, recurrent mouth/genital ulcers, sicca symptoms (+mild dry mouth), recurrent sinusitis/rhinitis  No h/o recurrent miscarriages or arterial/venous thrombosis in the past  No h/o chest pain + SOB with exertion, chronic cough occasionally productive (no hemoptysis)  No h/o persistent nausea, vomiting, constipation, diarrhea, abdominal pain +2 episodes band-like abdominal pain resolved on own   No h/o hematochezia, hematuria, hemoptysis, hematemesis  No h/o seizures or strokes  No h/o cancer         Past Medical History:     Past Medical History:   Diagnosis Date     Chronic bronchitis (H) 07/30/2015     Contact dermatitis      Depressive disorder 1985     Eczema     HANDS     Elevated liver enzymes      H/O total knee replacement, left      History of total hip replacement 10/27/2011     Hyperlipidemia      Hypertension      Morbid obesity (H)      Osteoarthrosis     right knee     Sleep apnea      Tobacco use disorder  "     Past Surgical History:   Procedure Laterality Date     ARTHROPLASTY KNEE  6/14/2012    Procedure: ARTHROPLASTY KNEE;  Left Total Knee Arthroplasty;  Surgeon: Annette Quesada MD;  Location: [facility] OR     ARTHROSCOPY HIP Right      C TOTAL HIP ARTHROPLASTY  07/09/04    RT            Social History:     Social History     Occupational History     registered nurse      [employer]     Social History Main Topics     Smoking status: Current Every Day Smoker     Packs/day: 1.00     Years: 33.00     Types: Cigarettes     Start date: 1/1/1982     Smokeless tobacco: Never Used      Comment: maybe     Alcohol use 0.0 oz/week      Comment: occassional     Drug use: No     Sexual activity: No   works as a nurse, 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 - worked same job last year   Still smoking, but cut back to about 1 pack per week  Rare EtOH use         Family History:     Family History   Problem Relation Age of Onset     Thyroid Disease Mother      Hypertension Mother      Skin Cancer Mother      MMohs surgery with skin graft     CEREBROVASCULAR DISEASE Mother      CVA after endarderectomy     DIABETES Mother      Breast Cancer Paternal Grandmother      Pancreatic Cancer Paternal Grandmother      Cardiovascular Paternal Aunt      Cardiovascular Paternal Uncle      Depression Other      Alcoholism Father      Thyroid Disease Sister      Alcoholism Sister      Hypertension Maternal Grandmother      HEART DISEASE Sister      multiple ablations     Alcoholism Sister      Prostate Cancer Paternal Grandfather    Both mother and sister with thyroid disease, she is unsure if this is autoimmune     No fam hx of RA, SLE, type 1 DM         Allergies:     Allergies   Allergen Reactions     Adhesive Tape Blisters     Erythromycin Rash            Medications:     Current Outpatient Prescriptions   Medication Sig Dispense Refill     methotrexate 50 MG/2ML injection CHEMO Inject 0.8 mL (20 mg) weekly 4 mL 2      "leucovorin (WELLCOVORIN) 5 MG tablet Take 1 tablet (5 mg) by mouth daily Once weekly, take 24h after taking weekly methotrexate dose 4 tablet 2     insulin syringe-needle U-100 (BD INSULIN SYRINGE ULTRAFINE) 30G X 1/2\" 1 ML For methotrexate use weekly 8 each prn     lisinopril (PRINIVIL/ZESTRIL) 10 MG tablet Take 1 tablet (10 mg) by mouth daily Needs to make an appointment with cardiology for further refills. 90 tablet 0     folic acid (FOLVITE) 1 MG tablet Take 4 tablets (4 mg) by mouth daily Take 3-4 tablets daily 360 tablet 3     meloxicam (MOBIC) 15 MG tablet Take 1 tablet (15 mg) by mouth daily 30 tablet 1     cholecalciferol (VITAMIN D3) 43749 UNITS capsule Take 1 capsule (10,000 Units) by mouth daily 30 capsule 0     order for DME Equipment being ordered: BIPAP  19/13 CM H20  AIRCURVE 10  AIRFIT F20  SN# [id number]   DN# [id number]       adalimumab (HUMIRA PEN) 40 MG/0.8ML pen kit Inject 0.8 mLs (40 mg) Subcutaneous every 14 days Hold for signs of infection, and seek medical attention. (Patient not taking: Reported on 3/5/2018) 1 kit 5     albuterol (PROAIR HFA/PROVENTIL HFA/VENTOLIN HFA) 108 (90 BASE) MCG/ACT Inhaler Inhale 2 puffs into the lungs every 6 hours as needed for shortness of breath / dyspnea or wheezing 1 Inhaler 0     [DISCONTINUED] meloxicam (MOBIC) 15 MG tablet Take 1 tablet (15 mg) by mouth daily 30 tablet 1     Ibuprofen (ADVIL PO) Take 400 mg by mouth every 4 hours as needed        fluocinonide (LIDEX) 0.05 % ointment Apply topically daily To hands and feet when dermatitis active 60 g 2            Physical Exam:   Blood pressure 149/79, pulse 102, temperature 98.2  F (36.8  C), temperature source Oral, height 1.676 m (5' 6\"), weight (!) 185.4 kg (408 lb 12.8 oz), not currently breastfeeding.  Wt Readings from Last 4 Encounters:   03/05/18 (!) 185.4 kg (408 lb 12.8 oz)   12/04/17 (!) 181.8 kg (400 lb 12.8 oz)   10/16/17 (!) 178.6 kg (393 lb 12.8 oz)   09/15/17 (!) 176.7 kg (389 lb 8 oz)     BP " Readings from Last 3 Encounters:   03/05/18 149/79   12/04/17 (!) 151/91   10/23/17 137/89     Constitutional: well-developed, appearing stated age  Eyes: PERRLA, normal conjunctiva, sclera  ENT: nl external ears, nose, lips. No mucous membrane lesions, normal saliva pool  Neck: no cervical or supraclavicular lymphadenopathy, no parotid swelling, normal palpable thyroid  Resp: CTAB, breathing comfortably on room air  CV: RRR, no m/r/g, no LE edema  GI: soft, NT/ND, +BS, no HSM  Lymph: no cervical, supraclavicular or epitrochlear nodes  MSK: All joints including neck, shoulder, elbow, wrist, MCP/PIP/DIP, hip, knee, ankle, and foot MTP/PIP/DIP joints examined and found normal w/o synovitis or deformity other than that listed below:   - able to make full fist, normal  strength.   - mild swelling of PIP and MCP of both hands with minimal tenderness.   - mild tenderness of MTPs both feet  - mild tenderness of left heel along base of Nicole's tendon  No dactylitis,  tenosynovitis  Skin: no nail pitting; no nodules. Well-healed vertical scar overlying left knee  Psych: nl judgement, orientation, memory, affect.  Neuro: CN II-XII grossly intact, moving all extremities equally, normal gait.         Data:     Results for orders placed or performed in visit on 12/04/17   ALT   Result Value Ref Range    ALT 34 0 - 50 U/L   AST   Result Value Ref Range    AST 17 0 - 45 U/L   Creatinine   Result Value Ref Range    Creatinine 0.58 0.52 - 1.04 mg/dL    GFR Estimate >90 >60 mL/min/1.7m2    GFR Estimate If Black >90 >60 mL/min/1.7m2   CBC with platelets differential   Result Value Ref Range    WBC 7.5 4.0 - 11.0 10e9/L    RBC Count 5.08 3.8 - 5.2 10e12/L    Hemoglobin 16.0 (H) 11.7 - 15.7 g/dL    Hematocrit 49.1 (H) 35.0 - 47.0 %    MCV 97 78 - 100 fl    MCH 31.5 26.5 - 33.0 pg    MCHC 32.6 31.5 - 36.5 g/dL    RDW 17.2 (H) 10.0 - 15.0 %    Platelet Count 242 150 - 450 10e9/L    Diff Method Automated Method     % Neutrophils 67.1  %    % Lymphocytes 21.4 %    % Monocytes 7.0 %    % Eosinophils 2.9 %    % Basophils 0.5 %    % Immature Granulocytes 1.1 %    Nucleated RBCs 0 0 /100    Absolute Neutrophil 5.0 1.6 - 8.3 10e9/L    Absolute Lymphocytes 1.6 0.8 - 5.3 10e9/L    Absolute Monocytes 0.5 0.0 - 1.3 10e9/L    Absolute Eosinophils 0.2 0.0 - 0.7 10e9/L    Absolute Basophils 0.0 0.0 - 0.2 10e9/L    Abs Immature Granulocytes 0.1 0 - 0.4 10e9/L    Absolute Nucleated RBC 0.0    ESR   Result Value Ref Range    Sed Rate 12 0 - 30 mm/h   CRP inflammation   Result Value Ref Range    CRP Inflammation 9.5 (H) 0.0 - 8.0 mg/L   Vitamin D Deficiency   Result Value Ref Range    Vitamin D Deficiency screening 6 (L) 20 - 75 ug/L   Calcium   Result Value Ref Range    Calcium 9.8 8.5 - 10.1 mg/dL   Parathyroid Hormone Intact   Result Value Ref Range    Parathyroid Hormone Intact 99 (H) 12 - 72 pg/mL     EMG 1/2017 - severe R side and moderate L side median neuropathy at wrist c/w BL CTS    Recent BMP, CBC normal  TSH 12/2016 nl  UA 2012 nl other than 7 RBC  HCV negative  RF 38, CCP 9  CRP 11.3, ESR 10  Lyme Ab 1.57 (+), confirmatory IgG/IgM neg  EMERY neg    MRI c-spine - mild-mod DDD  XR BL knees 2011 - c/w OA  XR R knee 2017 - tricompartmental OA worse in medial compartment  XR BL hands/feet 8/2017: enthesopathy of L Nicole's tendon, mild DJD changes, no e/o erosions     EMG 1/2017:  Results:  Nerve conduction studies:  1. Right median-D2 sensory response is absent.    2. Left median-D2 sensory response shows moderately slowed CV and  moderately reduced amplitude.    3. Bilateral ulnar-D5 and radial sensory responses were normal.    4. Left median ulnar palmar interlatency difference was  prolonged.    5. Right median-APB motor response showed moderately prolonged  DL, mildly reduced amplitude, and normal CV in the forearm.    6. Left median-APB motor response showed mildly prolonged DL,  normal amplitude, and normal CV in the forearm.    7. Bilateral  ulnar-ADM motor responses were normal.    8. Right median-lumbrical compared to ulnar-interossei  interlatency difference was prolonged.     Needle EM. Fibrillation potentials and positive sharp waves were seen in  the right APB muscle.    2. Large amplitude and/or long duration motor unit potentials  (MUP) with increased polyphasia were seen in the bilateral APB  muscles.    3. Rapidly firing MUPs with reduced recruitment were seen in the  bilateral APB muscles.      Interpretation:  This is an abnormal study. There is electrophysiologic evidence  of a severe right-sided and a moderate left-sided median  neuropathy at the wrist (e.g., bilateral carpal tunnel syndrome).  Clinical correlation is recommended.      Rosita Chandra MD    Reviewed all relevant data including labs and imaging.     I saw this patient with the Rheumatology Fellow. I agree with the findings and recommendations.    Everett Austin M.D.  Staff Rheumatologist, [facility]  Pager [phone]

## 2018-03-05 NOTE — LETTER
3/5/2018      RE: Shira Rodriguez  [address] [facility] [address]       Formerly [facility] - Rheumatology Clinic Visit     Shira Rodriguez MRN# [id number]   YOB: 1960    Primary care provider: Anjel Busby  Mar 5, 2018          Assessment and Plan:     Problem list:    # seropositive non-erosive RA  Disease with ongoing mild activity on exam today and by history. Currently on max dose SQ methotrexate and has been approved for Humira but has not yet started.   - continue MTX 10 tabs weekly  - continue FA 3 mg qd + leucovorin 5 mg weekly   - q3 mo monitoring labs  - start Humira 40 mg qow. Met with Ashli moralez RN today for injection teaching. 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 At this point, she does not seem to have active synovitis on exam however, therefore this is less likely     # L heel pain, prior XR showing enthesopathy of Incole's tendon  - improving. Was possibly enthesitis related to her RA    # Osteoarthritis, s/p L TKA and R TAWNY  - still getting good response from 12/2017 R knee CS injection  - now s/p pool therapy winter 2018, would continue weight loss management strategies  - previously recommended offloading brace for R knee - she has not yet gotten this from     # ongoing tobacco abuse  - likely contributing to development of autoimmunity and ongoing systemic inflammation   - has cut from 1 -> 1/2 ppd       -- continue to work on cessation with primary care and mental health         Other:  # morbid obesity - has met with bariatric surgery, not yet ready for surgery  # low pos Lyme Ab 1.57, confirmatory Western blot IgG/M neg - 12/2016  # numbness BL shins (L onset 2004  following L TKA, R onset 2015 following RLE injury)  # fam hx of thyroid disease ?autoimmune (mother, sister)  # dyshidrotic eczema BL hands  # post-menopausal bleeding     Routine health maintenance:  HBVsAg: neg 4/2017  HBVcore: neg 4/2017  HCV: neg 4/2017  HIV: neg 9/2017  Q gold (or T spot): neg 8/2017   Vitamin D and calcium/bone health: recommend DEXA scan, has previously wanted to hold off  Immunizations: UTD flu, prevnar 13 8/2017, PPSV23 12/2017  Cancer screenings (skin, breast, colon, pap): due for colonoscopy    RTC in 3 months with me    Patient was seen and discussed with Dr. Austin.     Rosita Chandra MD  Rheumatology Fellow  Pager [phone]          Active Problem List:     Patient Active Problem List    Diagnosis Date Noted     CYRUS (obstructive sleep apnea) 01/04/2017     Priority: Medium     1/11/2017(401#)-AHI 80, RDI 90, lowest oxygen saturation was 83%, bilevel PAP 19/13 cm/H20 effective.   1/30/2016-AHI 16.6, lowest oxygen saturation was 89%       Elevated blood pressure (not hypertension) 01/04/2017     Priority: Medium     Degeneration of cervical intervertebral disc 12/26/2016     Priority: Medium     Arthritis of knee, degenerative 06/14/2012     Priority: Medium     Morbid obesity (H) 01/11/2012     Priority: Medium     Adjustment disorder with depressed mood 10/27/2011     Priority: Medium     Tobacco use disorder 10/27/2011     Priority: Medium     Primary localized osteoarthrosis, pelvic region and thigh 06/02/2004     Priority: Medium            History of Present Illness:     Chief Complaint   Patient presents with     RECHECK     follow up with arthritis, tzimmer cma     Rheumatological history:  Patient is a 57 year old female who was originally referred to rheumatology in 2/2017 by her PCP Dr. Busby for 3 months of BL hand pain pain thought consistent with CTS and positive RF/CCP. EMG showed moderate median neuropathy on L and severe on R, MRI c-spine normal. She received  R CT injection by sports med with 9 months of relief. She did describe intermittent episodes of swelling of her feet/ankles around this time period as well that resolved with medrol dose pack. She has a hx of L TKA and R TAWNY. She has chronic numbness of anterior BL shins following L TKA (was told they may have injected the nerve with marcaine around surgery years ago and numbness in R shin started in 2015 following a fall where developed large hematoma). When she was evaluated in 2/2017 she had no inflammatory arthritis symptoms and did not meet diagnosis of RA. We discussed starting plaquenil given it's role in potentially preventing pts with +RF/CCP from developing clinical RA, although she did not want to do this at the time. She was re-evaluated 8/2017 after developing polyarticular inflammatory arthritis of the right knee, ankles, wrists, hands, and shoulders. She was started on MTX, given depomedrol IM injection, oral prednisone, and given R knee CS injection. MTX increased and oral prednisone tapered to off in 9/2017. Had repeat R CT injection in 10/2017 that lasted until 1/2018. R knee significantly improved following CS injection 12/2017. Has persistent pain in L heel with enthesopathy on imaging, unclear if had true enthesitis in this region due to her RA. Participated in pool therapy winter 2018 with good response.     Last OV: 12/4/2017    Today's visit:  Ms. Rodriguez reports she has ongoing numbness/tingling in right hand that bothers her intermittently throughout the day and throughout night. Back to working temp job, mostly using computer and hand actually doesn't bother her when typing, only when trying to write. She has not yet seen sports clinic back for repeat CT injection. She is worried that too many injections will worsen her arthritis. She has never had discussion about CT release. She reports ongoing stiffness in both hands that lasts all day. No significant stiffness just in the morning.  "Doesn't feel she has new deformities of joints and no joints that are particularly swollen today. R knee still feeling good after the 12/2017 injection. Pain in L heel has improved some. Enjoyed pool therapy this winter. Has not yet started the Humira that she has been approved for. She did not call to arrange delivery. Continues on MTX weekly and now tolerating much better with addition of weekly leucovorin and folic acid 3 mg daily. Diarrhea, oral ulcers, tongue discomfort resolved. Does have really dry mouth that is bothersome - makes it difficult to swallow at times - thinks she \"forgets to swallow.\" She is wondering if this is due to her medications.            Review of Systems:   Complete ROS negative except for symptoms mentioned in the HPI     No h/o  myalgia, unintentional weight loss, loss of appetite, fevers, swollen glands + fatigue, dyshidrotic eczema BL hands  No h/o gout  No family or personal history of psoriasis, UC or crohn's disease. No h/o iritis. No plantar fascitis. + L Nicole's tendon pain  Patient denies any raynauds, malar rash, photosensitivity, recurrent mouth/genital ulcers, sicca symptoms (+mild dry mouth), recurrent sinusitis/rhinitis  No h/o recurrent miscarriages or arterial/venous thrombosis in the past  No h/o chest pain + SOB with exertion, chronic cough occasionally productive (no hemoptysis)  No h/o persistent nausea, vomiting, constipation, diarrhea, abdominal pain +2 episodes band-like abdominal pain resolved on own   No h/o hematochezia, hematuria, hemoptysis, hematemesis  No h/o seizures or strokes  No h/o cancer         Past Medical History:     Past Medical History:   Diagnosis Date     Chronic bronchitis (H) 07/30/2015     Contact dermatitis      Depressive disorder 1985     Eczema     HANDS     Elevated liver enzymes      H/O total knee replacement, left      History of total hip replacement 10/27/2011     Hyperlipidemia      Hypertension      Morbid obesity (H)      " Osteoarthrosis     right knee     Sleep apnea      Tobacco use disorder      Past Surgical History:   Procedure Laterality Date     ARTHROPLASTY KNEE  6/14/2012    Procedure: ARTHROPLASTY KNEE;  Left Total Knee Arthroplasty;  Surgeon: Annette Quesada MD;  Location: [facility] OR     ARTHROSCOPY HIP Right      C TOTAL HIP ARTHROPLASTY  07/09/04    RT            Social History:     Social History     Occupational History     registered nurse      [employer]     Social History Main Topics     Smoking status: Current Every Day Smoker     Packs/day: 1.00     Years: 33.00     Types: Cigarettes     Start date: 1/1/1982     Smokeless tobacco: Never Used      Comment: maybe     Alcohol use 0.0 oz/week      Comment: occassional     Drug use: No     Sexual activity: No   works as a nurse, 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 - worked same job last year   Still smoking, but cut back to about 1 pack per week  Rare EtOH use         Family History:     Family History   Problem Relation Age of Onset     Thyroid Disease Mother      Hypertension Mother      Skin Cancer Mother      MMohs surgery with skin graft     CEREBROVASCULAR DISEASE Mother      CVA after endarderectomy     DIABETES Mother      Breast Cancer Paternal Grandmother      Pancreatic Cancer Paternal Grandmother      Cardiovascular Paternal Aunt      Cardiovascular Paternal Uncle      Depression Other      Alcoholism Father      Thyroid Disease Sister      Alcoholism Sister      Hypertension Maternal Grandmother      HEART DISEASE Sister      multiple ablations     Alcoholism Sister      Prostate Cancer Paternal Grandfather    Both mother and sister with thyroid disease, she is unsure if this is autoimmune     No fam hx of RA, SLE, type 1 DM         Allergies:     Allergies   Allergen Reactions     Adhesive Tape Blisters     Erythromycin Rash            Medications:     Current Outpatient Prescriptions   Medication Sig Dispense Refill     methotrexate 50  "MG/2ML injection CHEMO Inject 0.8 mL (20 mg) weekly 4 mL 2     leucovorin (WELLCOVORIN) 5 MG tablet Take 1 tablet (5 mg) by mouth daily Once weekly, take 24h after taking weekly methotrexate dose 4 tablet 2     insulin syringe-needle U-100 (BD INSULIN SYRINGE ULTRAFINE) 30G X 1/2\" 1 ML For methotrexate use weekly 8 each prn     lisinopril (PRINIVIL/ZESTRIL) 10 MG tablet Take 1 tablet (10 mg) by mouth daily Needs to make an appointment with cardiology for further refills. 90 tablet 0     folic acid (FOLVITE) 1 MG tablet Take 4 tablets (4 mg) by mouth daily Take 3-4 tablets daily 360 tablet 3     meloxicam (MOBIC) 15 MG tablet Take 1 tablet (15 mg) by mouth daily 30 tablet 1     cholecalciferol (VITAMIN D3) 30904 UNITS capsule Take 1 capsule (10,000 Units) by mouth daily 30 capsule 0     order for DME Equipment being ordered: BIPAP  19/13 CM H20  AIRCURVE 10  AIRFIT F20  SN# [id number]   DN# [id number]       adalimumab (HUMIRA PEN) 40 MG/0.8ML pen kit Inject 0.8 mLs (40 mg) Subcutaneous every 14 days Hold for signs of infection, and seek medical attention. (Patient not taking: Reported on 3/5/2018) 1 kit 5     albuterol (PROAIR HFA/PROVENTIL HFA/VENTOLIN HFA) 108 (90 BASE) MCG/ACT Inhaler Inhale 2 puffs into the lungs every 6 hours as needed for shortness of breath / dyspnea or wheezing 1 Inhaler 0     [DISCONTINUED] meloxicam (MOBIC) 15 MG tablet Take 1 tablet (15 mg) by mouth daily 30 tablet 1     Ibuprofen (ADVIL PO) Take 400 mg by mouth every 4 hours as needed        fluocinonide (LIDEX) 0.05 % ointment Apply topically daily To hands and feet when dermatitis active 60 g 2            Physical Exam:   Blood pressure 149/79, pulse 102, temperature 98.2  F (36.8  C), temperature source Oral, height 1.676 m (5' 6\"), weight (!) 185.4 kg (408 lb 12.8 oz), not currently breastfeeding.  Wt Readings from Last 4 Encounters:   03/05/18 (!) 185.4 kg (408 lb 12.8 oz)   12/04/17 (!) 181.8 kg (400 lb 12.8 oz)   10/16/17 (!) 178.6 kg " (393 lb 12.8 oz)   09/15/17 (!) 176.7 kg (389 lb 8 oz)     BP Readings from Last 3 Encounters:   03/05/18 149/79   12/04/17 (!) 151/91   10/23/17 137/89     Constitutional: well-developed, appearing stated age  Eyes: PERRLA, normal conjunctiva, sclera  ENT: nl external ears, nose, lips. No mucous membrane lesions, normal saliva pool  Neck: no cervical or supraclavicular lymphadenopathy, no parotid swelling, normal palpable thyroid  Resp: CTAB, breathing comfortably on room air  CV: RRR, no m/r/g, no LE edema  GI: soft, NT/ND, +BS, no HSM  Lymph: no cervical, supraclavicular or epitrochlear nodes  MSK: All joints including neck, shoulder, elbow, wrist, MCP/PIP/DIP, hip, knee, ankle, and foot MTP/PIP/DIP joints examined and found normal w/o synovitis or deformity other than that listed below:   - able to make full fist, normal  strength.   - mild swelling of PIP and MCP of both hands with minimal tenderness.   - mild tenderness of MTPs both feet  - mild tenderness of left heel along base of Nicole's tendon  No dactylitis,  tenosynovitis  Skin: no nail pitting; no nodules. Well-healed vertical scar overlying left knee  Psych: nl judgement, orientation, memory, affect.  Neuro: CN II-XII grossly intact, moving all extremities equally, normal gait.         Data:     Results for orders placed or performed in visit on 12/04/17   ALT   Result Value Ref Range    ALT 34 0 - 50 U/L   AST   Result Value Ref Range    AST 17 0 - 45 U/L   Creatinine   Result Value Ref Range    Creatinine 0.58 0.52 - 1.04 mg/dL    GFR Estimate >90 >60 mL/min/1.7m2    GFR Estimate If Black >90 >60 mL/min/1.7m2   CBC with platelets differential   Result Value Ref Range    WBC 7.5 4.0 - 11.0 10e9/L    RBC Count 5.08 3.8 - 5.2 10e12/L    Hemoglobin 16.0 (H) 11.7 - 15.7 g/dL    Hematocrit 49.1 (H) 35.0 - 47.0 %    MCV 97 78 - 100 fl    MCH 31.5 26.5 - 33.0 pg    MCHC 32.6 31.5 - 36.5 g/dL    RDW 17.2 (H) 10.0 - 15.0 %    Platelet Count 242 150 - 450  10e9/L    Diff Method Automated Method     % Neutrophils 67.1 %    % Lymphocytes 21.4 %    % Monocytes 7.0 %    % Eosinophils 2.9 %    % Basophils 0.5 %    % Immature Granulocytes 1.1 %    Nucleated RBCs 0 0 /100    Absolute Neutrophil 5.0 1.6 - 8.3 10e9/L    Absolute Lymphocytes 1.6 0.8 - 5.3 10e9/L    Absolute Monocytes 0.5 0.0 - 1.3 10e9/L    Absolute Eosinophils 0.2 0.0 - 0.7 10e9/L    Absolute Basophils 0.0 0.0 - 0.2 10e9/L    Abs Immature Granulocytes 0.1 0 - 0.4 10e9/L    Absolute Nucleated RBC 0.0    ESR   Result Value Ref Range    Sed Rate 12 0 - 30 mm/h   CRP inflammation   Result Value Ref Range    CRP Inflammation 9.5 (H) 0.0 - 8.0 mg/L   Vitamin D Deficiency   Result Value Ref Range    Vitamin D Deficiency screening 6 (L) 20 - 75 ug/L   Calcium   Result Value Ref Range    Calcium 9.8 8.5 - 10.1 mg/dL   Parathyroid Hormone Intact   Result Value Ref Range    Parathyroid Hormone Intact 99 (H) 12 - 72 pg/mL     EMG 1/2017 - severe R side and moderate L side median neuropathy at wrist c/w BL CTS    Recent BMP, CBC normal  TSH 12/2016 nl  UA 2012 nl other than 7 RBC  HCV negative  RF 38, CCP 9  CRP 11.3, ESR 10  Lyme Ab 1.57 (+), confirmatory IgG/IgM neg  EMERY neg    MRI c-spine - mild-mod DDD  XR BL knees 2011 - c/w OA  XR R knee 2017 - tricompartmental OA worse in medial compartment  XR BL hands/feet 8/2017: enthesopathy of L Burt's tendon, mild DJD changes, no e/o erosions     EMG 1/2017:  Results:  Nerve conduction studies:  1. Right median-D2 sensory response is absent.    2. Left median-D2 sensory response shows moderately slowed CV and  moderately reduced amplitude.    3. Bilateral ulnar-D5 and radial sensory responses were normal.    4. Left median ulnar palmar interlatency difference was  prolonged.    5. Right median-APB motor response showed moderately prolonged  DL, mildly reduced amplitude, and normal CV in the forearm.    6. Left median-APB motor response showed mildly prolonged DL,  normal  amplitude, and normal CV in the forearm.    7. Bilateral ulnar-ADM motor responses were normal.    8. Right median-lumbrical compared to ulnar-interossei  interlatency difference was prolonged.     Needle EM. Fibrillation potentials and positive sharp waves were seen in  the right APB muscle.    2. Large amplitude and/or long duration motor unit potentials  (MUP) with increased polyphasia were seen in the bilateral APB  muscles.    3. Rapidly firing MUPs with reduced recruitment were seen in the  bilateral APB muscles.      Interpretation:  This is an abnormal study. There is electrophysiologic evidence  of a severe right-sided and a moderate left-sided median  neuropathy at the wrist (e.g., bilateral carpal tunnel syndrome).  Clinical correlation is recommended.      Rosita Chandra MD    Reviewed all relevant data including labs and imaging.     I saw this patient with the Rheumatology Fellow. I agree with the findings and recommendations.    Everett Austin M.D.  Staff Rheumatologist, [facility]  Pager [phone]    Rosita Chandra MD

## 2018-03-05 NOTE — NURSING NOTE
Relevant Diagnosis:  Seropositive RA    Teaching Topic:  Self injection of Humira    Person(s) involved in teaching:  Patient    Motivation Level:   Asks Questions:   Yes  Eager to Learn:  Yes  Cooperative:  Yes  Receptive (willing/able to accept information):  Yes  Comments: None    Patient demonstrates understanding of the following:  Reason for the appointment, diagnosis and treatment plan:  Yes  Knowledge of proper use of medications and conditions for which they are ordered (with special attention to potential side effects or drug interactions):  Yes  Which situations necessitate calling provider and whom to contact:  Yes (signs of infection, temp > 101, recurrent bouts of illness or infection or if patient has been put on an antibiotic)    Teaching Concerns:  No.  Demonstration was given by nurse with patient doing return demonstrations using practice supplies until she is comfortable     Proper use and care of Medication:  Yes, Pens:  Yes and Sharps container disposal:  Yes  Nutritional needs and diet plan:  N/A  Pain management techniques:  N/A  Patient instructed on hand hygiene:  Yes  How and/when to access community resources:  Yes      Infection Prevention:  Patient demonstrates understanding of the following:  Signs and symptoms of infection taught:  Yes      Instructional Materials Used/Given: Humira handouts      Time spent teaching with patient:  Spent 20 minutes with patient teaching proper technique and observation of patient performing self injection. Answered all of the patient s questions to his satisfaction.      Ashli Barth RN  Rheumatology Clinic

## 2018-03-05 NOTE — MR AVS SNAPSHOT
After Visit Summary   3/5/2018    Shira Rodriguez    MRN: [id number]           Patient Information     Date Of Birth          1960        Visit Information        Provider Department      3/5/2018 7:30 AM Rosita Chandra MD [facility] Rheumatology        Today's Diagnoses     Rheumatoid arthritis involving multiple sites with positive rheumatoid factor (H)    -  1    High risk medication use        Adverse effect of methotrexate, initial encounter           Follow-ups after your visit        Follow-up notes from your care team     Return in about 3 months (around 6/5/2018).      Your next 10 appointments already scheduled     Jun 11, 2018  7:30 AM CDT   (Arrive by 7:15 AM)   Return Visit with Rosita Chandra MD   [facility]ology ([facility])    [address]  [address]  [facility] [address] [phone]              Who to contact     If you have questions or need follow up information about today's clinic visit or your schedule please contact [facility] RHEUMATOLOGY directly at [phone].  Normal or non-critical lab and imaging results will be communicated to you by PPIhart, letter or phone within 4 business days after the clinic has received the results. If you do not hear from us within 7 days, please contact the clinic through PPIhart or phone. If you have a critical or abnormal lab result, we will notify you by phone as soon as possible.  Submit refill requests through Framebridge or call your pharmacy and they will forward the refill request to us. Please allow 3 business days for your refill to be completed.          Additional Information About Your Visit        PPIhart Information     Framebridge gives you secure access to your electronic health record. If you see a primary care provider, you can also send messages to your care team and make appointments. If you have questions, please call your primary care clinic.  If you do not have a  "primary care provider, please call [phone] and they will assist you.        Care EveryWhere ID     This is your Care EveryWhere ID. This could be used by other organizations to access your [facility] medical records  [id number]        Your Vitals Were     Pulse Temperature Height BMI (Body Mass Index)          102 98.2  F (36.8  C) (Oral) 1.676 m (5' 6\") 65.98 kg/m2         Blood Pressure from Last 3 Encounters:   03/05/18 149/79   12/04/17 (!) 151/91   10/23/17 137/89    Weight from Last 3 Encounters:   03/05/18 (!) 185.4 kg (408 lb 12.8 oz)   12/04/17 (!) 181.8 kg (400 lb 12.8 oz)   10/16/17 (!) 178.6 kg (393 lb 12.8 oz)                 Today's Medication Changes          These changes are accurate as of 3/5/18 11:59 PM.  If you have any questions, ask your nurse or doctor.               These medicines have changed or have updated prescriptions.        Dose/Directions    meloxicam 15 MG tablet   Commonly known as:  MOBIC   This may have changed:  Another medication with the same name was removed. Continue taking this medication, and follow the directions you see here.   Used for:  Bilateral carpal tunnel syndrome   Changed by:  Rosita Chandra MD        Dose:  15 mg   Take 1 tablet (15 mg) by mouth daily   Quantity:  30 tablet   Refills:  1         Stop taking these medicines if you haven't already. Please contact your care team if you have questions.     ADVIL PO   Stopped by:  Rosita Chandra MD           aspirin 81 MG tablet   Stopped by:  Rosita Chandra MD           atorvastatin 20 MG tablet   Commonly known as:  LIPITOR   Stopped by:  Rosita Chandra MD           SERTRALINE HCL PO   Stopped by:  Rosita Chandra MD           topiramate 25 MG tablet   Commonly known as:  TOPAMAX   Stopped by:  Rosita Chandra MD                Where to get your medicines      These medications were sent to [id number] - [place], [place] - [address], [place] [address]     Phone:  [phone]     folic acid 1 MG tablet    leucovorin 5 MG tablet    methotrexate 50 MG/2ML injection CHEMO                Primary Care Provider Office Phone # Fax #    Anjel Busby [phone]       3 [address] [address]        Equal Access to Services     VARSHA FLOREZ : Hadii aad ku hadasho Soomaali, waaxda luqadaha, qaybta kaalmada adeegyada, waxay idiin hayaan adeeg khgill laregina . So [facility] [phone].    ATENCIÓN: Si mikela melissa, tiene a samano disposición servicios gratuitos de asistencia lingüística. Eriname al [phone].    We comply with applicable federal civil rights laws and [place] laws. We do not discriminate on the basis of race, color, national origin, age, disability, sex, sexual orientation, or gender identity.            Thank you!     Thank you for choosing [facility] RHEUMATOLOGY  for your care. Our goal is always to provide you with excellent care. Hearing back from our patients is one way we can continue to improve our services. Please take a few minutes to complete the written survey that you may receive in the mail after your visit with us. Thank you!             Your Updated Medication List - Protect others around you: Learn how to safely use, store and throw away your medicines at www.disposemymeds.org.          This list is accurate as of 3/5/18 11:59 PM.  Always use your most recent med list.                   Brand Name Dispense Instructions for use Diagnosis    adalimumab 40 MG/0.8ML pen kit    HUMIRA PEN    1 kit    Inject 0.8 mLs (40 mg) Subcutaneous every 14 days Hold for signs of infection, and seek medical attention.    Seropositive rheumatoid arthritis (H)       albuterol 108 (90 BASE) MCG/ACT Inhaler    PROAIR HFA/PROVENTIL HFA/VENTOLIN HFA    1 Inhaler    Inhale 2 puffs into the lungs every 6 hours as needed for shortness of breath / dyspnea or wheezing  "   SANTOS (dyspnea on exertion)       cholecalciferol 77309 UNITS capsule    vitamin D3    30 capsule    Take 1 capsule (10,000 Units) by mouth daily    Vitamin D deficiency       DULoxetine 60 MG EC capsule    CYMBALTA     TK ONE C PO  QAM        fluocinonide 0.05 % ointment    LIDEX    60 g    Apply topically daily To hands and feet when dermatitis active    Dermatitis       folic acid 1 MG tablet    FOLVITE    360 tablet    Take 4 tablets (4 mg) by mouth daily Take 3-4 tablets daily    High risk medication use, Rheumatoid arthritis involving multiple sites with positive rheumatoid factor (H)       insulin syringe-needle U-100 30G X 1/2\" 1 ML    BD insulin syringe ultrafine    8 each    For methotrexate use weekly    Rheumatoid arthritis involving multiple sites with positive rheumatoid factor (H)       leucovorin 5 MG tablet    WELLCOVORIN    4 tablet    Take 1 tablet (5 mg) by mouth daily Once weekly, take 24h after taking weekly methotrexate dose    Adverse effect of methotrexate, initial encounter       lisinopril 10 MG tablet    PRINIVIL/ZESTRIL    90 tablet    Take 1 tablet (10 mg) by mouth daily Needs to make an appointment with cardiology for further refills.    Essential hypertension       meloxicam 15 MG tablet    MOBIC    30 tablet    Take 1 tablet (15 mg) by mouth daily    Bilateral carpal tunnel syndrome       methotrexate 50 MG/2ML injection CHEMO     4 mL    Inject 0.8 mL (20 mg) weekly    Rheumatoid arthritis involving multiple sites with positive rheumatoid factor (H)       order for DME      Equipment being ordered: BIPAP 19/13 CM H20 AIRCURVE 10 AIRFIT F20 SN# [id number]   DN# [id number]          "

## 2018-03-05 NOTE — NURSING NOTE
"Chief Complaint   Patient presents with     RECHECK     follow up with arthritis, tzimmer cma       Initial /79  Pulse 102  Temp 98.2  F (36.8  C) (Oral)  Ht 1.676 m (5' 6\")  Wt (!) 185.4 kg (408 lb 12.8 oz)  BMI 65.98 kg/m2 Estimated body mass index is 65.98 kg/(m^2) as calculated from the following:    Height as of this encounter: 1.676 m (5' 6\").    Weight as of this encounter: 185.4 kg (408 lb 12.8 oz).  Medication Reconciliation: complete    "

## 2018-03-06 ENCOUNTER — TELEPHONE (OUTPATIENT)
Dept: RHEUMATOLOGY | Facility: CLINIC | Age: 58
End: 2018-03-06

## 2018-03-06 RX ORDER — FOLIC ACID 1 MG/1
4 TABLET ORAL DAILY
Qty: 360 TABLET | Refills: 3 | Status: SHIPPED | OUTPATIENT
Start: 2018-03-06 | End: 2018-06-04

## 2018-03-09 NOTE — TELEPHONE ENCOUNTER
PA NOT NEEDED    Medication: methotrexate 50 MG/2ML injection CHEMO-PA NOT NEEDED  Insurance Company: [employer] - Phone [phone] Fax [phone]  Pharmacy Filling the Rx: [id number] - [place], [place] - [address] [facility] [facility] [address] [facility]  Filling Pharmacy Phone: [phone]  Filling Pharmacy Fax:    Start Date: 3/9/2018      Tried to complete PA on CMM and got a notification that drug is covered by current benefit plan.  No further PA activity needed.  Called pharmacy to see what rejection they are receiving.  They are getting Refill too soon until 3/18/18.

## 2018-04-03 ASSESSMENT — ENCOUNTER SYMPTOMS
PARALYSIS: 0
SEIZURES: 0
LOSS OF CONSCIOUSNESS: 0
WEAKNESS: 0
DIZZINESS: 0
NUMBNESS: 1
TREMORS: 0
SPEECH CHANGE: 0
TINGLING: 1
HEADACHES: 0
DISTURBANCES IN COORDINATION: 0
MEMORY LOSS: 0

## 2018-04-06 DIAGNOSIS — G56.03 BILATERAL CARPAL TUNNEL SYNDROME: ICD-10-CM

## 2018-04-06 RX ORDER — MELOXICAM 15 MG/1
15 TABLET ORAL DAILY
Qty: 30 TABLET | Refills: 1 | OUTPATIENT
Start: 2018-04-06

## 2018-04-06 NOTE — TELEPHONE ENCOUNTER
meloxicam (MOBIC) 15 MG tablet 30 tablet 1 11/24/2017  No   Sig: Take 1 tablet (15 mg) by mouth daily   Class: E-Prescribe   Route: Oral   Order: [id number]   E-Prescribing Status: Receipt confirmed by pharmacy (11/24/2017  4:30 PM CST)

## 2018-04-10 ENCOUNTER — OFFICE VISIT (OUTPATIENT)
Dept: ORTHOPEDICS | Facility: CLINIC | Age: 58
End: 2018-04-10
Payer: COMMERCIAL

## 2018-04-10 VITALS — WEIGHT: 293 LBS | BODY MASS INDEX: 47.09 KG/M2 | HEIGHT: 66 IN

## 2018-04-10 DIAGNOSIS — G56.01 CARPAL TUNNEL SYNDROME OF RIGHT WRIST: Primary | ICD-10-CM

## 2018-04-10 NOTE — PROGRESS NOTES
"Diagnosis right carpal tunnel syndrome, worse again    Patient returns requesting injection for CT as before, she had about 6 months of relief since her previous one.     Right Carpal Tunnel Injection -  The patient was informed of the risks and the benefits of the procedure and a written consent was signed.  The patient s wrist was prepped with chlorhexidine in sterile fashion.   40 mg of depo suspension was drawn up into a 3 mL syringe with 1.0 mL of 1% lidocaine and 1.0 mL of 0.5% marcaine.  Injection was performed using sterile technique.  Under ultrasound guidance a 5/8\" 25-gauge needle was used to enter the wrist at the distal palmar crease medial to the median nerve and inject  There were no complications. The patient tolerated the procedure well. There was negligible bleeding.   The patient was instructed to ice the wrist upon leaving clinic and refrain from overuse over the next 3 days.   The patient was instructed to call or go to the emergency room with any unusual pain, swelling, redness, or if otherwise concerned.  A follow up appointment will be scheduled to evaluate response to the injection, and to assess range of motion and pain.  DEPO-MEDROL NDC# 8460-8730-70 WAS INJECTED   F/u in 6 months, sooner if worse, and will discuss surgical options at her request.    Dr Saxena  "

## 2018-04-10 NOTE — MR AVS SNAPSHOT
"              After Visit Summary   4/10/2018    Shira Rodriguez    MRN: [id number]           Patient Information     Date Of Birth          1960        Visit Information        Provider Department      4/10/2018 4:00 PM Tashi Saxena MD [facility]        Today's Diagnoses     Carpal tunnel syndrome of right wrist    -  1       Follow-ups after your visit        Your next 10 appointments already scheduled     Jun 11, 2018  7:30 AM CDT   (Arrive by 7:15 AM)   Return Visit with Rosita Chandra MD   [facility] Rhe[facility] ([facility] [place])    [address] [place] [address]  [address] [place] [address] [phone]              Who to contact     Please call your clinic at [phone] to:    Ask questions about your health    Make or cancel appointments    Discuss your medicines    Learn about your test results    Speak to your doctor            Additional Information About Your Visit        MyChart Information     Torrentialt gives you secure access to your electronic health record. If you see a primary care provider, you can also send messages to your care team and make appointments. If you have questions, please call your primary care clinic.  If you do not have a primary care provider, please call [phone] and they will assist you.      Andel is an electronic gateway that provides easy, online access to your medical records. With Andel, you can request a clinic appointment, read your test results, renew a prescription or communicate with your care team.     To access your existing account, please contact your [facility] or call [phone] for assistance.        Care EveryWhere ID     This is your Care EveryWhere ID. This could be used by other organizations to access your [facility] medical records  [id number]        Your Vitals Were     Height BMI (Body Mass Index)                1.676 m (5' 6\") 65.85 kg/m2           " Blood Pressure from Last 3 Encounters:   03/05/18 149/79   12/04/17 (!) 151/91   10/23/17 137/89    Weight from Last 3 Encounters:   04/10/18 (!) 185.1 kg (408 lb)   03/05/18 (!) 185.4 kg (408 lb 12.8 oz)   12/04/17 (!) 181.8 kg (400 lb 12.8 oz)              We Performed the Following     DEPO MEDROL 40 MG     INJECTION THERAPEUTIC, CARPAL TUNNEL        Primary Care Provider Office Phone # Fax #    Anjel Busby [phone]       [address] [address]        Equal Access to Services     VARSHA FLOREZ : Dutch Wilkinson, wakodak evans, qaquocta kaalmada blake, andreas tello. So [facility] [phone].    ATENCIÓN: Si habla español, tiene a samano disposición servicios gratuitos de asistencia lingüística. Llame al [phone].    We comply with applicable federal civil rights laws and [place] laws. We do not discriminate on the basis of race, color, national origin, age, disability, sex, sexual orientation, or gender identity.            Thank you!     Thank you for choosing [facility] ORTHOPAEDIC CLINIC  for your care. Our goal is always to provide you with excellent care. Hearing back from our patients is one way we can continue to improve our services. Please take a few minutes to complete the written survey that you may receive in the mail after your visit with us. Thank you!             Your Updated Medication List - Protect others around you: Learn how to safely use, store and throw away your medicines at www.disposemymeds.org.          This list is accurate as of 4/10/18  4:51 PM.  Always use your most recent med list.                   Brand Name Dispense Instructions for use Diagnosis    adalimumab 40 MG/0.8ML pen kit    HUMIRA PEN    1 kit    Inject 0.8 mLs (40 mg) Subcutaneous every 14 days Hold for signs of infection, and seek medical attention.    Seropositive rheumatoid arthritis (H)       albuterol 108 (90 Base) MCG/ACT  "Inhaler    PROAIR HFA/PROVENTIL HFA/VENTOLIN HFA    1 Inhaler    Inhale 2 puffs into the lungs every 6 hours as needed for shortness of breath / dyspnea or wheezing    SANTOS (dyspnea on exertion)       cholecalciferol 76168 units capsule    vitamin D3    30 capsule    Take 1 capsule (10,000 Units) by mouth daily    Vitamin D deficiency       DULoxetine 60 MG EC capsule    CYMBALTA     TK ONE C PO  QAM        fluocinonide 0.05 % ointment    LIDEX    60 g    Apply topically daily To hands and feet when dermatitis active    Dermatitis       folic acid 1 MG tablet    FOLVITE    360 tablet    Take 4 tablets (4 mg) by mouth daily Take 3-4 tablets daily    High risk medication use, Rheumatoid arthritis involving multiple sites with positive rheumatoid factor (H)       insulin syringe-needle U-100 30G X 1/2\" 1 ML    BD insulin syringe ultrafine    8 each    For methotrexate use weekly    Rheumatoid arthritis involving multiple sites with positive rheumatoid factor (H)       leucovorin 5 MG tablet    WELLCOVORIN    4 tablet    Take 1 tablet (5 mg) by mouth daily Once weekly, take 24h after taking weekly methotrexate dose    Adverse effect of methotrexate, initial encounter       lisinopril 10 MG tablet    PRINIVIL/ZESTRIL    90 tablet    Take 1 tablet (10 mg) by mouth daily Needs to make an appointment with cardiology for further refills.    Essential hypertension       meloxicam 15 MG tablet    MOBIC    30 tablet    Take 1 tablet (15 mg) by mouth daily    Bilateral carpal tunnel syndrome       methotrexate 50 MG/2ML injection CHEMO     4 mL    Inject 0.8 mL (20 mg) weekly    Rheumatoid arthritis involving multiple sites with positive rheumatoid factor (H)       order for DME      Equipment being ordered: BIPAP 19/13 CM H20 AIRCURVE 10 AIRFIT F20 SN# [id number]   DN# [phone]          "

## 2018-04-10 NOTE — LETTER
"4/10/2018       RE: Shira Rodriguez  [address] [facility] [address]     Dear Colleague,    Thank you for referring your patient, Shira Rodriguez, to the [facility] at [facility]. Please see a copy of my visit note below.    Diagnosis right carpal tunnel syndrome, worse again    Patient returns requesting injection for CT as before, she had about 6 months of relief since her previous one.     Right Carpal Tunnel Injection -  The patient was informed of the risks and the benefits of the procedure and a written consent was signed.  The patient s wrist was prepped with chlorhexidine in sterile fashion.   40 mg of depo suspension was drawn up into a 3 mL syringe with 1.0 mL of 1% lidocaine and 1.0 mL of 0.5% marcaine.  Injection was performed using sterile technique.  Under ultrasound guidance a 5/8\" 25-gauge needle was used to enter the wrist at the distal palmar crease medial to the median nerve and inject  There were no complications. The patient tolerated the procedure well. There was negligible bleeding.   The patient was instructed to ice the wrist upon leaving clinic and refrain from overuse over the next 3 days.   The patient was instructed to call or go to the emergency room with any unusual pain, swelling, redness, or if otherwise concerned.  A follow up appointment will be scheduled to evaluate response to the injection, and to assess range of motion and pain.  DEPO-MEDROL NDC# 6392-5748-89 WAS INJECTED   F/u in 6 months, sooner if worse, and will discuss surgical options at her request.    Dr Saxena  "

## 2018-04-10 NOTE — NURSING NOTE
[facility] ORTHOPAEDIC [facility]IC  [address] [place] [address]  Dept: [phone]  ______________________________________________________________________________    Patient: Shira Rodriguez   : 1960   MRN: [id number]   April 10, 2018    INVASIVE PROCEDURE SAFETY CHECKLIST    Date: 4/10/2018   Procedure:Right carpal tunnel injection  Patient Name: Shira Rodriguez  MRN: [id number]  YOB: 1960    Action: Complete sections as appropriate. Any discrepancy results in a HARD COPY until resolved.     PRE PROCEDURE:  Patient ID verified with 2 identifiers (name and  or MRN): Yes  Procedure and site verified with patient/designee (when able): Yes  Accurate consent documentation in medical record: Yes  H&P (or appropriate assessment) documented in medical record: Yes  H&P must be up to 20 days prior to procedure and updates within 24 hours of procedure as applicable: Yes  Relevant diagnostic and radiology test results appropriately labeled and displayed as applicable: Yes  Procedure site(s) marked with provider initials: Yes    TIMEOUT:  Time-Out performed immediately prior to starting procedure, including verbal and active participation of all team members addressing the following:Yes  * Correct patient identify  * Confirmed that the correct side and site are marked  * An accurate procedure consent form  * Agreement on the procedure to be done  * Correct patient position  * Relevant images and results are properly labeled and appropriately displayed  * The need to administer antibiotics or fluids for irrigation purposes during the procedure as applicable   * Safety precautions based on patient history or medication use    DURING PROCEDURE: Verification of correct person, site, and procedures any time the responsibility for care of the patient is transferred to another member of the care team.     The following medication was given:     MEDICATION:  Depo Medrol 40mg  ROUTE:  IA  SITE: right wrist  DOSE: 1 cc  LOT #: [id number]  : Zopa [employer]  EXPIRATION DATE: 03/2019  NDC#: 7243-5582-29   Was there drug waste? No    MEDICATION:  Lidocaine without epinephrine  ROUTE: IA  SITE: right wrist  DOSE: 2 cc  LOT #: [id number]  : Hospira  EXPIRATION DATE: 12/2019  NDC#: 6245-2355-00   Was there drug waste? Yes  Amount of drug waste (mL): 18.  Reason for waste:  Single use vial     Xiomara Caicedo, ATC  April 10, 2018

## 2018-04-12 ENCOUNTER — OFFICE VISIT (OUTPATIENT)
Dept: ORTHOPEDICS | Facility: CLINIC | Age: 58
End: 2018-04-12
Payer: COMMERCIAL

## 2018-04-12 ENCOUNTER — RADIANT APPOINTMENT (OUTPATIENT)
Dept: GENERAL RADIOLOGY | Facility: CLINIC | Age: 58
End: 2018-04-12
Attending: PREVENTIVE MEDICINE
Payer: COMMERCIAL

## 2018-04-12 ENCOUNTER — MYC MEDICAL ADVICE (OUTPATIENT)
Dept: ORTHOPEDICS | Facility: CLINIC | Age: 58
End: 2018-04-12

## 2018-04-12 VITALS
SYSTOLIC BLOOD PRESSURE: 159 MMHG | HEIGHT: 66 IN | DIASTOLIC BLOOD PRESSURE: 97 MMHG | HEART RATE: 76 BPM | WEIGHT: 293 LBS | BODY MASS INDEX: 47.09 KG/M2

## 2018-04-12 DIAGNOSIS — G89.29 CHRONIC BILATERAL LOW BACK PAIN WITH BILATERAL SCIATICA: Primary | ICD-10-CM

## 2018-04-12 DIAGNOSIS — M54.41 CHRONIC BILATERAL LOW BACK PAIN WITH BILATERAL SCIATICA: Primary | ICD-10-CM

## 2018-04-12 DIAGNOSIS — M54.42 CHRONIC BILATERAL LOW BACK PAIN WITH BILATERAL SCIATICA: Primary | ICD-10-CM

## 2018-04-12 DIAGNOSIS — M54.50 LUMBAGO: ICD-10-CM

## 2018-04-12 DIAGNOSIS — M76.62 LEFT ACHILLES BURSITIS: ICD-10-CM

## 2018-04-12 PROCEDURE — 99213 OFFICE O/P EST LOW 20 MIN: CPT | Performed by: PREVENTIVE MEDICINE

## 2018-04-12 PROCEDURE — 72100 X-RAY EXAM L-S SPINE 2/3 VWS: CPT | Performed by: RADIOLOGY

## 2018-04-12 ASSESSMENT — PAIN SCALES - GENERAL: PAINLEVEL: SEVERE PAIN (6)

## 2018-04-12 NOTE — NURSING NOTE
"Shira Rodriguez's goals for this visit include: Left ankle and low back evaluation   She requests these members of her care team be copied on today's visit information: no    PCP: Anjel Busby    Referring Provider:  No referring provider defined for this encounter.    Chief Complaint   Patient presents with     RECHECK     Left achilles tendon and low back       Initial BP (!) 159/97  Pulse 76  Ht 1.676 m (5' 6\")  Wt (!) 185.1 kg (408 lb)  BMI 65.85 kg/m2 Estimated body mass index is 65.85 kg/(m^2) as calculated from the following:    Height as of this encounter: 1.676 m (5' 6\").    Weight as of this encounter: 185.1 kg (408 lb).  Medication Reconciliation: complete  "

## 2018-04-12 NOTE — LETTER
4/12/2018         RE: Shira Rodriguez  [address] [facility] [address]        Dear Colleague,    Thank you for referring your patient, Shira Rodriguez, to the [facility]. Please see a copy of my visit note below.    HISTORY OF PRESENT ILLNESS  Ms. Rodriguez is a pleasant 58 year old year old female who presents to clinic today with bilateral heel pain and lumbar pain  Shira explains that her heels have bothered her more over the past few months  She also has low back pain that radiates into her legs  Location: low back  Quality:  achy pain    Severity: 6/10 at worst    Duration: years  Timing: occurs intermittently with walking and standing and prolonged sitting  Modifying factors:  resting and non-use makes it better, movement and use makes it worse  Associated signs & symptoms: radiation of pain down legs  Additional history: as documented    MEDICAL HISTORY  Patient Active Problem List   Diagnosis     Primary localized osteoarthrosis, pelvic region and thigh     Adjustment disorder with depressed mood     Tobacco use disorder     Morbid obesity (H)     Arthritis of knee, degenerative     Degeneration of cervical intervertebral disc     CYRUS (obstructive sleep apnea)     Elevated blood pressure (not hypertension)       Current Outpatient Prescriptions   Medication Sig Dispense Refill     adalimumab (HUMIRA PEN) 40 MG/0.8ML pen kit Inject 0.8 mLs (40 mg) Subcutaneous every 14 days Hold for signs of infection, and seek medical attention. 1 kit 5     albuterol (PROAIR HFA/PROVENTIL HFA/VENTOLIN HFA) 108 (90 BASE) MCG/ACT Inhaler Inhale 2 puffs into the lungs every 6 hours as needed for shortness of breath / dyspnea or wheezing 1 Inhaler 0     cholecalciferol (VITAMIN D3) 33602 UNITS capsule Take 1 capsule (10,000 Units) by mouth daily 30 capsule 0     DULoxetine (CYMBALTA) 60 MG EC capsule TK ONE C PO  QAM  0     fluocinonide (LIDEX) 0.05 % ointment Apply topically daily To hands and feet  "when dermatitis active 60 g 2     folic acid (FOLVITE) 1 MG tablet Take 4 tablets (4 mg) by mouth daily Take 3-4 tablets daily 360 tablet 3     insulin syringe-needle U-100 (BD INSULIN SYRINGE ULTRAFINE) 30G X 1/2\" 1 ML For methotrexate use weekly 8 each prn     leucovorin (WELLCOVORIN) 5 MG tablet Take 1 tablet (5 mg) by mouth daily Once weekly, take 24h after taking weekly methotrexate dose 4 tablet 2     lisinopril (PRINIVIL/ZESTRIL) 10 MG tablet Take 1 tablet (10 mg) by mouth daily Needs to make an appointment with cardiology for further refills. 90 tablet 0     meloxicam (MOBIC) 15 MG tablet Take 1 tablet (15 mg) by mouth daily 30 tablet 1     methotrexate 50 MG/2ML injection CHEMO Inject 0.8 mL (20 mg) weekly 4 mL 2     order for DME Equipment being ordered: BIPAP  19/13 CM H20  AIRCURVE 10  AIRFIT F20  SN# [id number]   DN# [id number]       order for DME Equipment being ordered: [id number] $27  Heel Cup Tuli, RG 1 Device 0       Allergies   Allergen Reactions     Adhesive Tape Blisters     Erythromycin Rash       Family History   Problem Relation Age of Onset     Thyroid Disease Mother      Hypertension Mother      Skin Cancer Mother      MMohs surgery with skin graft     CEREBROVASCULAR DISEASE Mother      CVA after endarderectomy     DIABETES Mother      Breast Cancer Paternal Grandmother      Pancreatic Cancer Paternal Grandmother      Cardiovascular Paternal Aunt      Cardiovascular Paternal Uncle      Depression Other      Alcoholism Father      Thyroid Disease Sister      Alcoholism Sister      Hypertension Maternal Grandmother      HEART DISEASE Sister      multiple ablations     Alcoholism Sister      Prostate Cancer Paternal Grandfather        Additional medical/Social/Surgical histories reviewed in [facility] and updated as appropriate.     REVIEW OF SYSTEMS (5/2/2018)  10 point ROS of systems including Constitutional, Eyes, Respiratory, Cardiovascular, Gastroenterology, Genitourinary, Integumentary, " "Musculoskeletal, Psychiatric were all negative except for pertinent positives noted in my HPI.     PHYSICAL EXAM  Vitals:    04/12/18 0724   BP: (!) 159/97   Pulse: 76   Weight: (!) 185.1 kg (408 lb)   Height: 1.676 m (5' 6\")     Vital Signs: BP (!) 159/97  Pulse 76  Ht 1.676 m (5' 6\")  Wt (!) 185.1 kg (408 lb)  BMI 65.85 kg/m2 Patient declined being weighed. Body mass index is 65.85 kg/(m^2).    General  - normal appearance, in no obvious distress, overweight  CV  - normal peripheral perfusion  Pulm  - normal respiratory pattern, non-labored  Musculoskeletal - lumbar spine  - stance: normal gait without limp, no obvious leg length discrepancy, normal heel and toe walk  - inspection: normal bone and joint alignment, no obvious scoliosis  - palpation: no paravertebral or bony tenderness  - ROM: flexion exacerbates pain, normal extension, sidebending, rotation  - strength: lower extremities 5/5 in all planes  - special tests:  (+) straight leg raise- bilaterally  (+) slump test- low back pain  Neuro  - patellar and Achilles DTRs 2+ bilaterally, bilateral lower extremity sensory deficit throughout L5/S1 distribution, grossly normal coordination, normal muscle tone  Skin  - no ecchymosis, erythema, warmth, or induration, no obvious rash  Psych  - interactive, appropriate, normal mood and affect  Heels: bilateral pain to palpation over distal achilles attachment    ASSESSMENT & PLAN  59 yo female with lumbar ddd, radicular symptoms, and bilateral heel pain due to achilles bursitis  Reviewed her xray lumbar showing ddd  Ordered lumbar MRI  Given heel cups and HEP for heels  F/u after MRI will consider MEAGHAN  mobic PRN    Tashi Saxena MD, CAQSM      Again, thank you for allowing me to participate in the care of your patient.        Sincerely,        Tashi Saxena MD    "

## 2018-04-12 NOTE — MR AVS SNAPSHOT
After Visit Summary   2018    Shira Rodriguez    MRN: [id number]           Patient Information     Date Of Birth          1960        Visit Information        Provider Department      2018 7:20 AM Tashi Saxena MD [facility]        Today's Diagnoses     Chronic bilateral low back pain with bilateral sciatica    -  1    Left Achilles bursitis          Care Instructions    Thanks for coming today.  Ortho/Sports Medicine Clinic  [address] [place] [address]    To schedule future appointments in Ortho Clinic, you may call [phone].    To schedule ordered imaging by your provider:   Call Central Imaging [phone]    To schedule an injection ordered by your provider:  Call Central Imaging Injection scheduling line: [phone]  Posteroust available online at:  [email or web address]    Please call if any further questions or concerns ([phone]).  Clinic hours 8 am to 5 pm.    Return to clinic (call) if symptoms worsen or fail to improve.          Follow-ups after your visit        Your next 10 appointments already scheduled     2018  7:30 AM CDT   (Arrive by 7:15 AM)   Return Visit with Rosita Chandra MD   [facility] ([facility])    [address] [place] [address]  [address] [place] [id number] [phone]              Who to contact     If you have questions or need follow up information about today's clinic visit or your schedule please contact [facility] directly at [phone].  Normal or non-critical lab and imaging results will be communicated to you by MyChart, letter or phone within 4 business days after the clinic has received the results. If you do not hear from us within 7 days, please contact the clinic through MyChart or phone. If you have a critical or abnormal lab result, we will notify you by phone as soon as possible.  Submit refill requests  "through [employer] or call your pharmacy and they will forward the refill request to us. Please allow 3 business days for your refill to be completed.          Additional Information About Your Visit        [employer] Information     [employer] gives you secure access to your electronic health record. If you see a primary care provider, you can also send messages to your care team and make appointments. If you have questions, please call your primary care clinic.  If you do not have a primary care provider, please call [phone] and they will assist you.      [employer] is an electronic gateway that provides easy, online access to your medical records. With [employer], you can request a clinic appointment, read your test results, renew a prescription or communicate with your care team.     To access your existing account, please contact your [facility] or call [phone] for assistance.        Care EveryWhere ID     This is your Care EveryWhere ID. This could be used by other organizations to access your [facility] medical records  [id number]        Your Vitals Were     Pulse Height BMI (Body Mass Index)             76 1.676 m (5' 6\") 65.85 kg/m2          Blood Pressure from Last 3 Encounters:   04/12/18 (!) 159/97   03/05/18 149/79   12/04/17 (!) 151/91    Weight from Last 3 Encounters:   04/12/18 (!) 185.1 kg (408 lb)   04/10/18 (!) 185.1 kg (408 lb)   03/05/18 (!) 185.4 kg (408 lb 12.8 oz)                 Today's Medication Changes          These changes are accurate as of 4/12/18 11:59 PM.  If you have any questions, ask your nurse or doctor.               Start taking these medicines.        Dose/Directions    order for DME   Used for:  Left Achilles bursitis   Started by:  Tashi Saxena MD        Equipment being ordered: [id number] $27  Heel Cup CHAPIS Lopez   Quantity:  1 Device   Refills:  0            Where to get your medicines      Some of these will need a paper " prescription and others can be bought over the counter.  Ask your nurse if you have questions.     Bring a paper prescription for each of these medications     order for DME                Primary Care Provider Office Phone # Fax #    Anjel Busby [phone]       [address] [address]        Equal Access to Services     VARSHA FLOREZ : Hadii aad ku hadasho Soomaali, waaxda luqadaha, qaybta kaalmada adeegyada, waxay idiin hayaan adeeg kharash lakedarn . So [facility] [phone].    ATENCIÓN: Si habla español, tiene a samano disposición servicios gratuitos de asistencia lingüística. Llame al [phone].    We comply with applicable federal civil rights laws and [place] laws. We do not discriminate on the basis of race, color, national origin, age, disability, sex, sexual orientation, or gender identity.            Thank you!     Thank you for choosing [facility]  for your care. Our goal is always to provide you with excellent care. Hearing back from our patients is one way we can continue to improve our services. Please take a few minutes to complete the written survey that you may receive in the mail after your visit with us. Thank you!             Your Updated Medication List - Protect others around you: Learn how to safely use, store and throw away your medicines at www.disposemymeds.org.          This list is accurate as of 4/12/18 11:59 PM.  Always use your most recent med list.                   Brand Name Dispense Instructions for use Diagnosis    adalimumab 40 MG/0.8ML pen kit    HUMIRA PEN    1 kit    Inject 0.8 mLs (40 mg) Subcutaneous every 14 days Hold for signs of infection, and seek medical attention.    Seropositive rheumatoid arthritis (H)       albuterol 108 (90 Base) MCG/ACT Inhaler    PROAIR HFA/PROVENTIL HFA/VENTOLIN HFA    1 Inhaler    Inhale 2 puffs into the lungs every 6 hours as needed for shortness of breath / dyspnea or wheezing     "SANTOS (dyspnea on exertion)       cholecalciferol 90523 units capsule    vitamin D3    30 capsule    Take 1 capsule (10,000 Units) by mouth daily    Vitamin D deficiency       DULoxetine 60 MG EC capsule    CYMBALTA     TK ONE C PO  QAM        fluocinonide 0.05 % ointment    LIDEX    60 g    Apply topically daily To hands and feet when dermatitis active    Dermatitis       folic acid 1 MG tablet    FOLVITE    360 tablet    Take 4 tablets (4 mg) by mouth daily Take 3-4 tablets daily    High risk medication use, Rheumatoid arthritis involving multiple sites with positive rheumatoid factor (H)       insulin syringe-needle U-100 30G X 1/2\" 1 ML    BD insulin syringe ultrafine    8 each    For methotrexate use weekly    Rheumatoid arthritis involving multiple sites with positive rheumatoid factor (H)       leucovorin 5 MG tablet    WELLCOVORIN    4 tablet    Take 1 tablet (5 mg) by mouth daily Once weekly, take 24h after taking weekly methotrexate dose    Adverse effect of methotrexate, initial encounter       lisinopril 10 MG tablet    PRINIVIL/ZESTRIL    90 tablet    Take 1 tablet (10 mg) by mouth daily Needs to make an appointment with cardiology for further refills.    Essential hypertension       meloxicam 15 MG tablet    MOBIC    30 tablet    Take 1 tablet (15 mg) by mouth daily    Bilateral carpal tunnel syndrome       methotrexate 50 MG/2ML injection CHEMO     4 mL    Inject 0.8 mL (20 mg) weekly    Rheumatoid arthritis involving multiple sites with positive rheumatoid factor (H)       order for DME      Equipment being ordered: BIPAP 19/13 CM H20 AIRCURVE 10 AIRFIT F20 # [id number]   DN# [id number]        order for DME     1 Device    Equipment being ordered: [id number] $27  Heel Cup CHAPIS Lopez    Left Achilles bursitis         "

## 2018-04-12 NOTE — PATIENT INSTRUCTIONS
Thanks for coming today.  Ortho/Sports Medicine Clinic  [address] [place] [address]    To schedule future appointments in Ortho Clinic, you may call [phone].    To schedule ordered imaging by your provider:   Call Central Imaging [phone]    To schedule an injection ordered by your provider:  Call Central Imaging Injection scheduling line: [phone]  [email or web address] available online at:  [email or web address]    Please call if any further questions or concerns ([phone]).  Clinic hours 8 am to 5 pm.    Return to clinic (call) if symptoms worsen or fail to improve.

## 2018-04-20 ENCOUNTER — RADIANT APPOINTMENT (OUTPATIENT)
Dept: MRI IMAGING | Facility: CLINIC | Age: 58
End: 2018-04-20
Attending: PREVENTIVE MEDICINE
Payer: COMMERCIAL

## 2018-04-20 DIAGNOSIS — G89.29 CHRONIC BILATERAL LOW BACK PAIN WITH BILATERAL SCIATICA: ICD-10-CM

## 2018-04-20 DIAGNOSIS — M54.42 CHRONIC BILATERAL LOW BACK PAIN WITH BILATERAL SCIATICA: ICD-10-CM

## 2018-04-20 DIAGNOSIS — M54.41 CHRONIC BILATERAL LOW BACK PAIN WITH BILATERAL SCIATICA: ICD-10-CM

## 2018-04-23 DIAGNOSIS — I10 ESSENTIAL HYPERTENSION: ICD-10-CM

## 2018-04-23 RX ORDER — LISINOPRIL 10 MG/1
10 TABLET ORAL DAILY
Qty: 90 TABLET | Refills: 0 | OUTPATIENT
Start: 2018-04-23

## 2018-04-23 NOTE — TELEPHONE ENCOUNTER
Denied Lisinopril refill as Pt has not been seen by Dr Valadez as requested.     Carli Davies LPN

## 2018-05-02 ENCOUNTER — MYC MEDICAL ADVICE (OUTPATIENT)
Dept: ORTHOPEDICS | Facility: CLINIC | Age: 58
End: 2018-05-02

## 2018-05-02 NOTE — PROGRESS NOTES
"HISTORY OF PRESENT ILLNESS  Ms. Rodriguez is a pleasant 58 year old year old female who presents to clinic today with bilateral heel pain and lumbar pain  Shira explains that her heels have bothered her more over the past few months  She also has low back pain that radiates into her legs  Location: low back  Quality:  achy pain    Severity: 6/10 at worst    Duration: years  Timing: occurs intermittently with walking and standing and prolonged sitting  Modifying factors:  resting and non-use makes it better, movement and use makes it worse  Associated signs & symptoms: radiation of pain down legs  Additional history: as documented    MEDICAL HISTORY  Patient Active Problem List   Diagnosis     Primary localized osteoarthrosis, pelvic region and thigh     Adjustment disorder with depressed mood     Tobacco use disorder     Morbid obesity (H)     Arthritis of knee, degenerative     Degeneration of cervical intervertebral disc     CYRUS (obstructive sleep apnea)     Elevated blood pressure (not hypertension)       Current Outpatient Prescriptions   Medication Sig Dispense Refill     adalimumab (HUMIRA PEN) 40 MG/0.8ML pen kit Inject 0.8 mLs (40 mg) Subcutaneous every 14 days Hold for signs of infection, and seek medical attention. 1 kit 5     albuterol (PROAIR HFA/PROVENTIL HFA/VENTOLIN HFA) 108 (90 BASE) MCG/ACT Inhaler Inhale 2 puffs into the lungs every 6 hours as needed for shortness of breath / dyspnea or wheezing 1 Inhaler 0     cholecalciferol (VITAMIN D3) 38466 UNITS capsule Take 1 capsule (10,000 Units) by mouth daily 30 capsule 0     DULoxetine (CYMBALTA) 60 MG EC capsule TK ONE C PO  QAM  0     fluocinonide (LIDEX) 0.05 % ointment Apply topically daily To hands and feet when dermatitis active 60 g 2     folic acid (FOLVITE) 1 MG tablet Take 4 tablets (4 mg) by mouth daily Take 3-4 tablets daily 360 tablet 3     insulin syringe-needle U-100 (BD INSULIN SYRINGE ULTRAFINE) 30G X 1/2\" 1 ML For methotrexate use " "weekly 8 each prn     leucovorin (WELLCOVORIN) 5 MG tablet Take 1 tablet (5 mg) by mouth daily Once weekly, take 24h after taking weekly methotrexate dose 4 tablet 2     lisinopril (PRINIVIL/ZESTRIL) 10 MG tablet Take 1 tablet (10 mg) by mouth daily Needs to make an appointment with cardiology for further refills. 90 tablet 0     meloxicam (MOBIC) 15 MG tablet Take 1 tablet (15 mg) by mouth daily 30 tablet 1     methotrexate 50 MG/2ML injection CHEMO Inject 0.8 mL (20 mg) weekly 4 mL 2     order for DME Equipment being ordered: BIPAP  19/13 CM H20  AIRCURVE 10  AIRFIT F20  SN# [id number]   DN# [id number]       order for DME Equipment being ordered: [id number] $27  Heel Cup Tuli, RG 1 Device 0       Allergies   Allergen Reactions     Adhesive Tape Blisters     Erythromycin Rash       Family History   Problem Relation Age of Onset     Thyroid Disease Mother      Hypertension Mother      Skin Cancer Mother      MMohs surgery with skin graft     CEREBROVASCULAR DISEASE Mother      CVA after endarderectomy     DIABETES Mother      Breast Cancer Paternal Grandmother      Pancreatic Cancer Paternal Grandmother      Cardiovascular Paternal Aunt      Cardiovascular Paternal Uncle      Depression Other      Alcoholism Father      Thyroid Disease Sister      Alcoholism Sister      Hypertension Maternal Grandmother      HEART DISEASE Sister      multiple ablations     Alcoholism Sister      Prostate Cancer Paternal Grandfather        Additional medical/Social/Surgical histories reviewed in [facility] and updated as appropriate.     REVIEW OF SYSTEMS (5/2/2018)  10 point ROS of systems including Constitutional, Eyes, Respiratory, Cardiovascular, Gastroenterology, Genitourinary, Integumentary, Musculoskeletal, Psychiatric were all negative except for pertinent positives noted in my HPI.     PHYSICAL EXAM  Vitals:    04/12/18 0724   BP: (!) 159/97   Pulse: 76   Weight: (!) 185.1 kg (408 lb)   Height: 1.676 m (5' 6\")     Vital Signs: BP " "(!) 159/97  Pulse 76  Ht 1.676 m (5' 6\")  Wt (!) 185.1 kg (408 lb)  BMI 65.85 kg/m2 Patient declined being weighed. Body mass index is 65.85 kg/(m^2).    General  - normal appearance, in no obvious distress, overweight  CV  - normal peripheral perfusion  Pulm  - normal respiratory pattern, non-labored  Musculoskeletal - lumbar spine  - stance: normal gait without limp, no obvious leg length discrepancy, normal heel and toe walk  - inspection: normal bone and joint alignment, no obvious scoliosis  - palpation: no paravertebral or bony tenderness  - ROM: flexion exacerbates pain, normal extension, sidebending, rotation  - strength: lower extremities 5/5 in all planes  - special tests:  (+) straight leg raise- bilaterally  (+) slump test- low back pain  Neuro  - patellar and Achilles DTRs 2+ bilaterally, bilateral lower extremity sensory deficit throughout L5/S1 distribution, grossly normal coordination, normal muscle tone  Skin  - no ecchymosis, erythema, warmth, or induration, no obvious rash  Psych  - interactive, appropriate, normal mood and affect  Heels: bilateral pain to palpation over distal achilles attachment    ASSESSMENT & PLAN  59 yo female with lumbar ddd, radicular symptoms, and bilateral heel pain due to achilles bursitis  Reviewed her xray lumbar showing ddd  Ordered lumbar MRI  Given heel cups and HEP for heels  F/u after MRI will consider MEAGHAN  devang Saxena MD, CAQSM    "

## 2018-05-04 DIAGNOSIS — M51.16 LUMBAR DISC HERNIATION WITH RADICULOPATHY: Primary | ICD-10-CM

## 2018-05-10 ENCOUNTER — RADIANT APPOINTMENT (OUTPATIENT)
Dept: GENERAL RADIOLOGY | Facility: CLINIC | Age: 58
End: 2018-05-10
Attending: PREVENTIVE MEDICINE
Payer: COMMERCIAL

## 2018-05-10 VITALS
RESPIRATION RATE: 18 BRPM | HEART RATE: 108 BPM | DIASTOLIC BLOOD PRESSURE: 99 MMHG | OXYGEN SATURATION: 97 % | SYSTOLIC BLOOD PRESSURE: 167 MMHG | TEMPERATURE: 98.3 F

## 2018-05-10 DIAGNOSIS — M51.16 LUMBAR DISC HERNIATION WITH RADICULOPATHY: ICD-10-CM

## 2018-05-10 RX ORDER — LIDOCAINE HYDROCHLORIDE 10 MG/ML
30 INJECTION, SOLUTION EPIDURAL; INFILTRATION; INTRACAUDAL; PERINEURAL ONCE
Status: COMPLETED | OUTPATIENT
Start: 2018-05-10 | End: 2018-05-10

## 2018-05-10 RX ORDER — IOPAMIDOL 408 MG/ML
20 INJECTION, SOLUTION INTRATHECAL ONCE
Status: COMPLETED | OUTPATIENT
Start: 2018-05-10 | End: 2018-05-10

## 2018-05-10 RX ORDER — BUPIVACAINE HYDROCHLORIDE 5 MG/ML
10 INJECTION, SOLUTION EPIDURAL; INTRACAUDAL ONCE
Status: COMPLETED | OUTPATIENT
Start: 2018-05-10 | End: 2018-05-10

## 2018-05-10 RX ORDER — METHYLPREDNISOLONE ACETATE 80 MG/ML
80 INJECTION, SUSPENSION INTRA-ARTICULAR; INTRALESIONAL; INTRAMUSCULAR; SOFT TISSUE ONCE
Status: COMPLETED | OUTPATIENT
Start: 2018-05-10 | End: 2018-05-10

## 2018-05-10 RX ADMIN — METHYLPREDNISOLONE ACETATE 1 ML: 80 INJECTION, SUSPENSION INTRA-ARTICULAR; INTRALESIONAL; INTRAMUSCULAR; SOFT TISSUE at 08:16

## 2018-05-10 RX ADMIN — IOPAMIDOL 2 ML: 408 INJECTION, SOLUTION INTRATHECAL at 08:16

## 2018-05-10 RX ADMIN — BUPIVACAINE HYDROCHLORIDE 2 ML: 5 INJECTION, SOLUTION EPIDURAL; INTRACAUDAL at 08:16

## 2018-05-10 RX ADMIN — LIDOCAINE HYDROCHLORIDE 5 ML: 10 INJECTION, SOLUTION EPIDURAL; INFILTRATION; INTRACAUDAL; PERINEURAL at 08:16

## 2018-05-10 NOTE — MR AVS SNAPSHOT
"                  MRN:[id number]                      After Visit Summary   5/10/2018    Shira Rodriguez    MRN: [id number]           Visit Information        Provider Department      5/10/2018 8:30 AM  NEURO RAD;  IMAGING NURSE; XR3 [facility] X[facility]           Review of your medicines          These changes are accurate as of 5/10/18  8:28 AM.  If you have any questions, ask your nurse or doctor.               CONTINUE these medicines which have NOT CHANGED        Dose / Directions    adalimumab 40 MG/0.8ML pen kit   Commonly known as:  HUMIRA PEN   Used for:  Seropositive rheumatoid arthritis (H)        Dose:  40 mg   Inject 0.8 mLs (40 mg) Subcutaneous every 14 days Hold for signs of infection, and seek medical attention.   Quantity:  1 kit   Refills:  5       albuterol 108 (90 Base) MCG/ACT Inhaler   Commonly known as:  PROAIR HFA/PROVENTIL HFA/VENTOLIN HFA   Used for:  SANTOS (dyspnea on exertion)        Dose:  2 puff   Inhale 2 puffs into the lungs every 6 hours as needed for shortness of breath / dyspnea or wheezing   Quantity:  1 Inhaler   Refills:  0       cholecalciferol 57249 units capsule   Commonly known as:  vitamin D3   Used for:  Vitamin D deficiency        Dose:  09807 Units   Take 1 capsule (10,000 Units) by mouth daily   Quantity:  30 capsule   Refills:  0       DULoxetine 60 MG EC capsule   Commonly known as:  CYMBALTA        TK ONE C PO  QAM   Refills:  0       fluocinonide 0.05 % ointment   Commonly known as:  LIDEX   Used for:  Dermatitis        Apply topically daily To hands and feet when dermatitis active   Quantity:  60 g   Refills:  2       folic acid 1 MG tablet   Commonly known as:  FOLVITE   Used for:  High risk medication use, Rheumatoid arthritis involving multiple sites with positive rheumatoid factor (H)        Dose:  4 mg   Take 4 tablets (4 mg) by mouth daily Take 3-4 tablets daily   Quantity:  360 tablet   Refills:  3       insulin syringe-needle U-100 30G X 1/2\" 1 " ML   Commonly known as:  BD insulin syringe ultrafine   Used for:  Rheumatoid arthritis involving multiple sites with positive rheumatoid factor (H)        For methotrexate use weekly   Quantity:  8 each   Refills:  prn       leucovorin 5 MG tablet   Commonly known as:  WELLCOVORIN   Used for:  Adverse effect of methotrexate, initial encounter        Dose:  5 mg   Take 1 tablet (5 mg) by mouth daily Once weekly, take 24h after taking weekly methotrexate dose   Quantity:  4 tablet   Refills:  2       lisinopril 10 MG tablet   Commonly known as:  PRINIVIL/ZESTRIL   Used for:  Essential hypertension        Dose:  10 mg   Take 1 tablet (10 mg) by mouth daily Needs to make an appointment with cardiology for further refills.   Quantity:  90 tablet   Refills:  0       meloxicam 15 MG tablet   Commonly known as:  MOBIC   Used for:  Bilateral carpal tunnel syndrome        Dose:  15 mg   Take 1 tablet (15 mg) by mouth daily   Quantity:  30 tablet   Refills:  1       methotrexate 50 MG/2ML injection CHEMO   Used for:  Rheumatoid arthritis involving multiple sites with positive rheumatoid factor (H)        Inject 0.8 mL (20 mg) weekly   Quantity:  4 mL   Refills:  2       order for DME        Equipment being ordered: BIPAP 19/13 CM H20 AIRCURVE 10 AIRFIT F20 SN# [id number]   DN# [id number]   Refills:  0       order for DME   Used for:  Left Achilles bursitis        Equipment being ordered: [id number] $27  Heel Cup CHAPIS Lopez   Quantity:  1 Device   Refills:  0                Protect others around you: Learn how to safely use, store and throw away your medicines at www.disposemymeds.org.         Follow-ups after your visit        Your next 10 appointments already scheduled     May 10, 2018  8:30 AM CDT   XR LUMBAR EPIDURAL INJECTION with JESIXR3, JESI IMAGING NURSE, JESI NEURO RAD   [facility] ([facility])    [address]  1st Floor  [facility] [address] [phone]           For  nerve root injection, please send or bring copies of any MRIs or other scans you have had.  Bring a list of your current medicines to your exam. (Include vitamins, minerals and over-the-counter medicines.) Leave your valuables at home.  Plan to have someone drive you home afterward.  Stop taking the following medicines (but talk to your doctor first):   If you take blood thinners, you may need to stop taking them a few days before treatment. Talk to your doctor before stopping these medicines.Stop taking Coumadin (warfarin) 3 days before treatment. Restart the day after treatment.   If you take Plavix, Ticlid, Pletal or Persantine, please ask your doctor if you should stop these medicines. You may need extra tests on the morning of your scan.   If you take Xarelto (Rivaroxaban), you may need to stop taking it 24 hours before treatment. Talk to your doctor before stopping this medicine. Restart the day after treatment.  You may take your other medicines as normal.  Stop all food and drink (including water) 3 hours before your test or treatment.  Please tell the doctor:   If you are allergic to X-ray dye (contrast fluid).   If you may be pregnant.  Injections take about 30 to 45 minutes. Most people spend up to 2 hours in the clinic or hospital.  Please call the Imaging Department at your exam site with any questions.            Jun 11, 2018  7:30 AM CDT   (Arrive by 7:15 AM)   Return Visit with Rosita Chandra MD   [facility] Rheumatology ([facility])    [address]  [address]  [facility] [address] [phone]               Care Instructions        Further instructions from your care team       Discharge Instructions for Epidural Steroid Injection/Nerve Block    Care of injection site:    If you have new numbness down your leg after the injection, this may last up to 4-6 hours, but should go away. You may need help with walking until your leg feels normal.    Over the  next 24 to 48 hours, pain at the injection site may increase before it gets better.    For the next 48 hours, use ice packs for 15 minutes,3-4 times a day for pain relief.    If you have a bandage, you may remove it the next morning     Do not submerge injection site in water for 24 hours, (no baths. pools, hot tubs). Showers are OK.            Activity:    You should relax today and then you may return to your regular activity tomorrow.    You may eat a normal diet.    Medicines:    Restart all your medicines tomorrow at your regular dose, including Coumadin (Warfarin).    If you are restarting Coumadin, talk to your doctor about having your INR checked.    If you received steroids: The steroid should help reduce swelling and pain. This may take from 3-14 days. Pain from the shot will be mild and go away in 2-3 days.     Common side effects may include:    Insomnia    Heartburn    Flushed face    Water retention    Increased appetite    Increased blood sugar      If you have diabetes, watch your blood sugar closely, If needed, call your doctor to help control your blood sugar.    Some patients will get lasting relief from a single injection. Others may require additional injections to get results.     Call your doctor or go to the Emergency Room if you have new severe pain or fever.     Additional Information About Your Visit        CrossReader Information     CrossReader gives you secure access to your electronic health record. If you see a primary care provider, you can also send messages to your care team and make appointments. If you have questions, please call your primary care clinic.  If you do not have a primary care provider, please call [phone] and they will assist you.      CrossReader is an electronic gateway that provides easy, online access to your medical records. With CrossReader, you can request a clinic appointment, read your test results, renew a prescription or communicate with your care team.     To  access your existing account, please contact your [facility] or call [phone] for assistance.        Care EveryWhere ID     This is your Care EveryWhere ID. This could be used by other organizations to access your [place] medical records  [id number]        Your Vitals Were     Blood Pressure Pulse Temperature Respirations Pulse Oximetry       174/79 95 98.3  F (36.8  C) (Oral) 18 97%        Primary Care Provider Office Phone # Fax #    Anjel Busby [phone]      Equal Access to Services     [facility]: Hadii aad ku hadasho Soomaali, waaxda luqadaha, qaybta kaalmada adeegyada, waxchavez herring . So [facility] [phone].    ATENCIÓN: Si habla español, tiene a samano disposición servicios gratuitos de asistencia lingüística. [facility] [phone].    We comply with applicable federal civil rights laws and [place] laws. We do not discriminate on the basis of race, color, national origin, age, disability, sex, sexual orientation, or gender identity.            Thank you!     Thank you for choosing [facility] [place] for your care. Our goal is always to provide you with excellent care. Hearing back from our patients is one way we can continue to improve our services. Please take a few minutes to complete the written survey that you may receive in the mail after you visit with us. Thank you!             Medication List: This is a list of all your medications and when to take them. Check marks below indicate your daily home schedule. Keep this list as a reference.          This list is accurate as of 5/10/18  8:28 AM.  Always use your most recent med list.               Medications           Morning Afternoon Evening Bedtime As Needed    adalimumab 40 MG/0.8ML pen kit   Commonly known as:  HUMIRA PEN   Inject 0.8 mLs (40 mg) Subcutaneous every 14 days Hold for signs of infection, and seek medical attention.                                albuterol  "108 (90 Base) MCG/ACT Inhaler   Commonly known as:  PROAIR HFA/PROVENTIL HFA/VENTOLIN HFA   Inhale 2 puffs into the lungs every 6 hours as needed for shortness of breath / dyspnea or wheezing                                cholecalciferol 52139 units capsule   Commonly known as:  vitamin D3   Take 1 capsule (10,000 Units) by mouth daily                                DULoxetine 60 MG EC capsule   Commonly known as:  CYMBALTA   TK ONE C PO  QAM                                fluocinonide 0.05 % ointment   Commonly known as:  LIDEX   Apply topically daily To hands and feet when dermatitis active                                folic acid 1 MG tablet   Commonly known as:  FOLVITE   Take 4 tablets (4 mg) by mouth daily Take 3-4 tablets daily                                insulin syringe-needle U-100 30G X 1/2\" 1 ML   Commonly known as:  BD insulin syringe ultrafine   For methotrexate use weekly                                leucovorin 5 MG tablet   Commonly known as:  WELLCOVORIN   Take 1 tablet (5 mg) by mouth daily Once weekly, take 24h after taking weekly methotrexate dose                                lisinopril 10 MG tablet   Commonly known as:  PRINIVIL/ZESTRIL   Take 1 tablet (10 mg) by mouth daily Needs to make an appointment with cardiology for further refills.                                meloxicam 15 MG tablet   Commonly known as:  MOBIC   Take 1 tablet (15 mg) by mouth daily                                methotrexate 50 MG/2ML injection CHEMO   Inject 0.8 mL (20 mg) weekly                                order for DME   Equipment being ordered: BIPAP 19/13 CM H20 AIRCURVE 10 AIRFIT F20 # [id number]   DN# [id number]                                order for DME   Equipment being ordered: [id number] $27  Heel Cup CHAPIS Lopez                                  "

## 2018-05-10 NOTE — DISCHARGE INSTRUCTIONS
Discharge Instructions for Epidural Steroid Injection/Nerve Block    Care of injection site:    If you have new numbness down your leg after the injection, this may last up to 4-6 hours, but should go away. You may need help with walking until your leg feels normal.    Over the next 24 to 48 hours, pain at the injection site may increase before it gets better.    For the next 48 hours, use ice packs for 15 minutes,3-4 times a day for pain relief.    If you have a bandage, you may remove it the next morning     Do not submerge injection site in water for 24 hours, (no baths. pools, hot tubs). Showers are OK.            Activity:    You should relax today and then you may return to your regular activity tomorrow.    You may eat a normal diet.    Medicines:    Restart all your medicines tomorrow at your regular dose, including Coumadin (Warfarin).    If you are restarting Coumadin, talk to your doctor about having your INR checked.    If you received steroids: The steroid should help reduce swelling and pain. This may take from 3-14 days. Pain from the shot will be mild and go away in 2-3 days.     Common side effects may include:    Insomnia    Heartburn    Flushed face    Water retention    Increased appetite    Increased blood sugar      If you have diabetes, watch your blood sugar closely, If needed, call your doctor to help control your blood sugar.    Some patients will get lasting relief from a single injection. Others may require additional injections to get results.     Call your doctor or go to the Emergency Room if you have new severe pain or fever.

## 2018-06-04 ENCOUNTER — RADIANT APPOINTMENT (OUTPATIENT)
Dept: GENERAL RADIOLOGY | Facility: CLINIC | Age: 58
End: 2018-06-04
Attending: INTERNAL MEDICINE
Payer: COMMERCIAL

## 2018-06-04 ENCOUNTER — OFFICE VISIT (OUTPATIENT)
Dept: RHEUMATOLOGY | Facility: CLINIC | Age: 58
End: 2018-06-04
Attending: INTERNAL MEDICINE
Payer: COMMERCIAL

## 2018-06-04 VITALS
HEART RATE: 97 BPM | HEIGHT: 66 IN | SYSTOLIC BLOOD PRESSURE: 148 MMHG | BODY MASS INDEX: 47.09 KG/M2 | DIASTOLIC BLOOD PRESSURE: 77 MMHG | TEMPERATURE: 97.9 F | WEIGHT: 293 LBS | OXYGEN SATURATION: 100 %

## 2018-06-04 DIAGNOSIS — R05.9 COUGH: Primary | ICD-10-CM

## 2018-06-04 DIAGNOSIS — R05.9 COUGH: ICD-10-CM

## 2018-06-04 DIAGNOSIS — M05.79 RHEUMATOID ARTHRITIS INVOLVING MULTIPLE SITES WITH POSITIVE RHEUMATOID FACTOR (H): ICD-10-CM

## 2018-06-04 DIAGNOSIS — Z79.899 HIGH RISK MEDICATION USE: ICD-10-CM

## 2018-06-04 DIAGNOSIS — T45.1X5A ADVERSE EFFECT OF METHOTREXATE, INITIAL ENCOUNTER: ICD-10-CM

## 2018-06-04 DIAGNOSIS — M05.79 RHEUMATOID ARTHRITIS, SEROPOSITIVE, MULTIPLE SITES (H): ICD-10-CM

## 2018-06-04 DIAGNOSIS — R60.0 EDEMA EXTREMITIES: ICD-10-CM

## 2018-06-04 LAB
ALBUMIN SERPL-MCNC: 3.3 G/DL (ref 3.4–5)
ALT SERPL W P-5'-P-CCNC: 40 U/L (ref 0–50)
AST SERPL W P-5'-P-CCNC: 22 U/L (ref 0–45)
BASOPHILS # BLD AUTO: 0 10E9/L (ref 0–0.2)
BASOPHILS NFR BLD AUTO: 0.5 %
CREAT SERPL-MCNC: 0.56 MG/DL (ref 0.52–1.04)
DIFFERENTIAL METHOD BLD: ABNORMAL
EOSINOPHIL # BLD AUTO: 0.2 10E9/L (ref 0–0.7)
EOSINOPHIL NFR BLD AUTO: 2.4 %
ERYTHROCYTE [DISTWIDTH] IN BLOOD BY AUTOMATED COUNT: 13.7 % (ref 10–15)
GFR SERPL CREATININE-BSD FRML MDRD: >90 ML/MIN/1.7M2
HCT VFR BLD AUTO: 47.5 % (ref 35–47)
HGB BLD-MCNC: 15.8 G/DL (ref 11.7–15.7)
IMM GRANULOCYTES # BLD: 0 10E9/L (ref 0–0.4)
IMM GRANULOCYTES NFR BLD: 0.4 %
LYMPHOCYTES # BLD AUTO: 1.4 10E9/L (ref 0.8–5.3)
LYMPHOCYTES NFR BLD AUTO: 18 %
MCH RBC QN AUTO: 31.8 PG (ref 26.5–33)
MCHC RBC AUTO-ENTMCNC: 33.3 G/DL (ref 31.5–36.5)
MCV RBC AUTO: 96 FL (ref 78–100)
MONOCYTES # BLD AUTO: 0.5 10E9/L (ref 0–1.3)
MONOCYTES NFR BLD AUTO: 6.1 %
NEUTROPHILS # BLD AUTO: 5.5 10E9/L (ref 1.6–8.3)
NEUTROPHILS NFR BLD AUTO: 72.6 %
NRBC # BLD AUTO: 0 10*3/UL
NRBC BLD AUTO-RTO: 0 /100
PLATELET # BLD AUTO: 214 10E9/L (ref 150–450)
RBC # BLD AUTO: 4.97 10E12/L (ref 3.8–5.2)
WBC # BLD AUTO: 7.5 10E9/L (ref 4–11)

## 2018-06-04 PROCEDURE — G0463 HOSPITAL OUTPT CLINIC VISIT: HCPCS | Mod: 25,ZF

## 2018-06-04 PROCEDURE — 82040 ASSAY OF SERUM ALBUMIN: CPT | Performed by: INTERNAL MEDICINE

## 2018-06-04 PROCEDURE — 90471 IMMUNIZATION ADMIN: CPT

## 2018-06-04 PROCEDURE — 25000125 ZZHC RX 250: Mod: ZF | Performed by: INTERNAL MEDICINE

## 2018-06-04 PROCEDURE — 84460 ALANINE AMINO (ALT) (SGPT): CPT | Performed by: INTERNAL MEDICINE

## 2018-06-04 PROCEDURE — 85025 COMPLETE CBC W/AUTO DIFF WBC: CPT | Performed by: INTERNAL MEDICINE

## 2018-06-04 PROCEDURE — 90750 HZV VACC RECOMBINANT IM: CPT | Mod: ZF | Performed by: INTERNAL MEDICINE

## 2018-06-04 PROCEDURE — 82565 ASSAY OF CREATININE: CPT | Performed by: INTERNAL MEDICINE

## 2018-06-04 PROCEDURE — 36415 COLL VENOUS BLD VENIPUNCTURE: CPT | Performed by: INTERNAL MEDICINE

## 2018-06-04 PROCEDURE — 84450 TRANSFERASE (AST) (SGOT): CPT | Performed by: INTERNAL MEDICINE

## 2018-06-04 RX ORDER — METHOTREXATE 25 MG/ML
INJECTION, SOLUTION INTRA-ARTERIAL; INTRAMUSCULAR; INTRAVENOUS
Qty: 4 ML | Refills: 2 | Status: SHIPPED | OUTPATIENT
Start: 2018-06-04 | End: 2018-10-03

## 2018-06-04 RX ORDER — FOLIC ACID 1 MG/1
4 TABLET ORAL DAILY
Qty: 360 TABLET | Refills: 3 | Status: SHIPPED | OUTPATIENT
Start: 2018-06-04 | End: 2018-10-03

## 2018-06-04 RX ORDER — LEUCOVORIN CALCIUM 5 MG/1
5 TABLET ORAL DAILY
Qty: 4 TABLET | Refills: 2 | Status: SHIPPED | OUTPATIENT
Start: 2018-06-04 | End: 2018-10-03

## 2018-06-04 RX ADMIN — ZOSTER VACCINE RECOMBINANT, ADJUVANTED 0.5 ML: KIT at 11:15

## 2018-06-04 ASSESSMENT — PAIN SCALES - GENERAL: PAINLEVEL: MODERATE PAIN (4)

## 2018-06-04 NOTE — MR AVS SNAPSHOT
After Visit Summary   6/4/2018    Shira Rodriguez    MRN: [id number]           Patient Information     Date Of Birth          1960        Visit Information        Provider Department      6/4/2018 10:00 AM Rosita Chandra MD [facility] Rheumatology        Today's Diagnoses     Cough    -  1    Rheumatoid arthritis, seropositive, multiple sites (H)        Edema extremities        High risk medication use        Rheumatoid arthritis involving multiple sites with positive rheumatoid factor (H)        Adverse effect of methotrexate, initial encounter          Care Instructions    shingrix today   Continue methorexate, folic acid, leucovorin   Echocardiogram  New baseline chest xray  Compression stockings  Follow up with Dr. JACOB for blood pressure and swelling          Follow-ups after your visit        Follow-up notes from your care team     Return in about 4 months (around 10/4/2018).      Your next 10 appointments already scheduled     Jun 04, 2018 11:50 AM CDT   XR CHEST 2 VIEWS with XR1   [facility] ([facility])    [address] [place] [address] [phone]           Please bring a list of your current medicines to your exam. (Include vitamins, minerals and over-thecounter medicines.) Leave your valuables at home.  Tell your doctor if there is a chance you may be pregnant.  You do not need to do anything special for this exam.            Jun 07, 2018  6:00 PM CDT   Ech Complete with UCECHCR2   [facility] Echo ([facility])    [address] [place] [id number] [phone]           1. Please bring or wear a comfortable two-piece outfit. 2. You may eat, drink and take your normal medicines. 3. For any questions that cannot be answered, please contact the ordering physician            Oct 03, 2018  7:00 AM CDT   (Arrive by 6:45 AM)   Return Visit with Everett Austin MD  "  [facility] ([facility])    [address]  [address]  [facility] [address] [phone]              Future tests that were ordered for you today     Open Future Orders        Priority Expected Expires Ordered    X-ray Chest 2 vws* Routine 6/4/2018 6/4/2019 6/4/2018    Echocardiogram Complete Routine  6/4/2019 6/4/2018            Who to contact     If you have questions or need follow up information about today's clinic visit or your schedule please contact [facility] RHEUMATOLOGY directly at [phone].  Normal or non-critical lab and imaging results will be communicated to you by Quidhart, letter or phone within 4 business days after the clinic has received the results. If you do not hear from us within 7 days, please contact the clinic through Cardiac Guardt or phone. If you have a critical or abnormal lab result, we will notify you by phone as soon as possible.  Submit refill requests through MySupportAssistant or call your pharmacy and they will forward the refill request to us. Please allow 3 business days for your refill to be completed.          Additional Information About Your Visit        Quidhart Information     MySupportAssistant gives you secure access to your electronic health record. If you see a primary care provider, you can also send messages to your care team and make appointments. If you have questions, please call your primary care clinic.  If you do not have a primary care provider, please call [phone] and they will assist you.        Care EveryWhere ID     This is your Care EveryWhere ID. This could be used by other organizations to access your [place] medical records  [id number]        Your Vitals Were     Pulse Temperature Height Pulse Oximetry BMI (Body Mass Index)       97 97.9  F (36.6  C) (Oral) 1.676 m (5' 6\") 100% 67.52 kg/m2        Blood Pressure from Last 3 Encounters:   06/04/18 148/77   05/10/18 (!) 167/99   04/12/18 (!) 159/97    Weight from Last 3 " Encounters:   06/04/18 (!) 189.7 kg (418 lb 4.8 oz)   04/12/18 (!) 185.1 kg (408 lb)   04/10/18 (!) 185.1 kg (408 lb)                 Today's Medication Changes          These changes are accurate as of 6/4/18 11:23 AM.  If you have any questions, ask your nurse or doctor.               Stop taking these medicines if you haven't already. Please contact your care team if you have questions.     adalimumab 40 MG/0.8ML pen kit   Commonly known as:  HUMIRA PEN   Stopped by:  Rosita Chandra MD           meloxicam 15 MG tablet   Commonly known as:  MOBIC   Stopped by:  Rosita Chandra MD                Where to get your medicines      These medications were sent to [id number]  [place] - [address] AT [address]  [address] [place] [address]     Phone:  [phone]     folic acid 1 MG tablet    leucovorin 5 MG tablet    methotrexate 50 MG/2ML injection CHEMO                Primary Care Provider Office Phone # Fax #    Anjel Busby [phone]       [address] [address]        Equal Access to Services     [facility]: Hadii dionisio ku hadasho Soomaali, waaxda luqadaha, qaybta kaalmada adeegyada, waxay idiin hayaan danielle tello. So [facility] [phone].    ATENCIÓN: Si habla español, tiene a samano disposición servicios gratuitos de asistencia lingüística. Llame al [phone].    We comply with applicable federal civil rights laws and [place] laws. We do not discriminate on the basis of race, color, national origin, age, disability, sex, sexual orientation, or gender identity.            Thank you!     Thank you for choosing [facility] RHEUMATOLOGY  for your care. Our goal is always to provide you with excellent care. Hearing back from our patients is one way we can continue to improve our services. Please take a few minutes to complete the written survey that you may receive in the  "mail after your visit with us. Thank you!             Your Updated Medication List - Protect others around you: Learn how to safely use, store and throw away your medicines at www.disposemymeds.org.          This list is accurate as of 6/4/18 11:23 AM.  Always use your most recent med list.                   Brand Name Dispense Instructions for use Diagnosis    albuterol 108 (90 Base) MCG/ACT Inhaler    PROAIR HFA/PROVENTIL HFA/VENTOLIN HFA    1 Inhaler    Inhale 2 puffs into the lungs every 6 hours as needed for shortness of breath / dyspnea or wheezing    SANTOS (dyspnea on exertion)       cholecalciferol 19267 units capsule    vitamin D3    30 capsule    Take 1 capsule (10,000 Units) by mouth daily    Vitamin D deficiency       DULoxetine 60 MG EC capsule    CYMBALTA     TK ONE C PO  QAM        fluocinonide 0.05 % ointment    LIDEX    60 g    Apply topically daily To hands and feet when dermatitis active    Dermatitis       folic acid 1 MG tablet    FOLVITE    360 tablet    Take 4 tablets (4 mg) by mouth daily Take 3-4 tablets daily    High risk medication use, Rheumatoid arthritis involving multiple sites with positive rheumatoid factor (H)       insulin syringe-needle U-100 30G X 1/2\" 1 ML    BD insulin syringe ultrafine    8 each    For methotrexate use weekly    Rheumatoid arthritis involving multiple sites with positive rheumatoid factor (H)       leucovorin 5 MG tablet    WELLCOVORIN    4 tablet    Take 1 tablet (5 mg) by mouth daily Once weekly, take 24h after taking weekly methotrexate dose    Adverse effect of methotrexate, initial encounter       lisinopril 10 MG tablet    PRINIVIL/ZESTRIL    90 tablet    Take 1 tablet (10 mg) by mouth daily Needs to make an appointment with cardiology for further refills.    Essential hypertension       methotrexate 50 MG/2ML injection CHEMO     4 mL    Inject 0.8 mL (20 mg) weekly    Rheumatoid arthritis involving multiple sites with positive rheumatoid factor (H)       " order for DME      Equipment being ordered: BIPAP 19/13 CM H20 AIRCURVE 10 AIRFIT F20 SN# [id number]   DN# [id number]        order for DME     1 Device    Equipment being ordered: [id number] $27  Heel Cup CHAPIS Lopez    Left Achilles bursitis

## 2018-06-04 NOTE — PATIENT INSTRUCTIONS
shingrix today   Continue methorexate, folic acid, leucovorin   Echocardiogram  New baseline chest xray  Compression stockings  Follow up with Dr. JACOB for blood pressure and swelling

## 2018-06-04 NOTE — LETTER
6/4/2018      RE: Shira Rodriguez  [id number] [address] [address]       [facility] - Rheumatology Clinic Visit     Shira Rodriguez MRN# [id number]   YOB: 1960    Primary care provider: Anjel Busby  Jun 4, 2018          Assessment and Plan:       # seropositive non-erosive RA (+RF/CCP, dx 8/2017 with diffuse full body polyarticular inflammatory arthritis flare)  Disease in remission on exam today and by history. Currently on max dose SQ methotrexate. Prescribed Humira at her last visit but she never started this and has actually improved (following R CTS injection).   - continue MTX 20 mg subcu injection weekly  - continue FA 3 mg qd + leucovorin 5 mg weekly (given about 24h after her MTX weekly dose)  - q3 mo monitoring labs, due now  - OK to hold off on Humira - d/c'd from her med list for now    # chronic cough   # new onset BL LE edema, x 3 wks  # hypertension, persistent, not on medication  - repeat 2 view CXR since on MTX -> some pulmonary vascular congestion and cardiomegaly. No obvious findings to suggest MTX-induced lung injury  - repeat TTE given edema -> scheduled for 6/7  - needs BP under better control -> should return to PCP  - compression stockings     # BL CTS, R>>L. Resolved.   - received repeat R CTS injection via US guidance several months ago with ongoing good response. Can continue to receive these PRN. Hopefully now that RA under better control her CT injections will last longer and may not even need.  - should continue to wear wrist splints and using ergonomic keyboard pad at work    # L heel pain, prior XR showing enthesopathy of Nicole's tendon  - improving    # Osteoarthritis, s/p L TKA and R TAWNY  # chronic low back pain  - still getting good response from 12/2017 R knee CS injection - can repeat as needed no sooner than q3m  - now s/p pool therapy winter 2018, would continue weight loss management strategies  - previously  recommended offloading brace for R knee - she has not yet gotten this  - ongoing PT would be useful    # ongoing tobacco abuse  - likely contributing to development of autoimmunity and ongoing systemic inflammation   - has cut from 1 -> 1/2 ppd       -- continue to work on cessation with primary care and mental health    # sicca symptoms. EMERY negative.   - primarily dry mouth. Likely SE to medications, can also be sicca/secondary Sjogren's associated with her RA  - drink frequent water throughout day, biotene products. Can consider secretagogues.          Other:  # morbid obesity - has met with bariatric surgery, not yet ready for surgery  # low pos Lyme Ab 1.57, confirmatory Western blot IgG/M neg - 12/2016  # numbness BL shins (L onset 2004 following L TKA, R onset 2015 following RLE injury)  # fam hx of thyroid disease ?autoimmune (mother, sister)  # dyshidrotic eczema BL hands - currently mildly active  # post-menopausal bleeding     Routine health maintenance:  HBVsAg: neg 4/2017  HBVcore: neg 4/2017  HCV: neg 4/2017  HIV: neg 9/2017  Q gold (or T spot): neg 8/2017   Vitamin D and calcium/bone health: recommend DEXA scan, has previously wanted to hold off  Immunizations: UTD flu, prevnar 13 8/2017, PPSV23 12/2017, shingrix TODAY 6/4/2018 and due for follow up vaccine at next rheum visit  Cancer screenings (skin, breast, colon, pap): due for colonoscopy    RTC in 4 mo with new fellow    Patient was seen and discussed with Dr. Myra Chandra MD  Rheumatology Fellow  Pager [phone]          Active Problem List:     Patient Active Problem List    Diagnosis Date Noted     CYRUS (obstructive sleep apnea) 01/04/2017     Priority: Medium     1/11/2017(401#)-AHI 80, RDI 90, lowest oxygen saturation was 83%, bilevel PAP 19/13 cm/H20 effective.   1/30/2016-AHI 16.6, lowest oxygen saturation was 89%       Elevated blood pressure (not hypertension) 01/04/2017     Priority: Medium     Degeneration of  cervical intervertebral disc 12/26/2016     Priority: Medium     Arthritis of knee, degenerative 06/14/2012     Priority: Medium     Morbid obesity (H) 01/11/2012     Priority: Medium     Adjustment disorder with depressed mood 10/27/2011     Priority: Medium     Tobacco use disorder 10/27/2011     Priority: Medium     Primary localized osteoarthrosis, pelvic region and thigh 06/02/2004     Priority: Medium            History of Present Illness:     Chief Complaint   Patient presents with     RECHECK     3 month follow up     Rheumatological history:  Patient is a 58 year old female who was originally referred to rheumatology in 2/2017 by her PCP Dr. Busby for 3 months of BL hand pain pain thought consistent with CTS and positive RF/CCP. EMG showed moderate median neuropathy on L and severe on R, MRI c-spine normal. She received R CT injection by sports [facility] with 9 months of relief. She did describe intermittent episodes of swelling of her feet/ankles around this time period as well that resolved with medrol dose pack. She has a hx of L TKA and R TAWNY. She has chronic numbness of anterior BL shins following L TKA (was told they may have injected the nerve with marcaine around surgery years ago and numbness in R shin started in 2015 following a fall where developed large hematoma). When she was evaluated in 2/2017 she had no inflammatory arthritis symptoms and did not meet diagnosis of RA. We discussed starting plaquenil given it's role in potentially preventing pts with +RF/CCP from developing clinical RA, although she did not want to do this at the time. She was re-evaluated 8/2017 after developing polyarticular inflammatory arthritis of the right knee, ankles, wrists, hands, and shoulders. She was started on MTX, given depomedrol IM injection, oral prednisone, and given R knee CS injection. MTX increased and oral prednisone tapered to off in 9/2017. Had repeat R CT injection in 10/2017 that lasted until 1/2018. R  knee significantly improved following CS injection 12/2017. Has persistent pain in L heel with enthesopathy on imaging, unclear if had true enthesitis in this region due to her RA. Participated in pool therapy winter 2018 with good response. At her 3/2018 OV she continued to have significant pain in her R wrist thought due to CT, but given some ongoing persistent EMS (BL hand stiffness lasting ALL day) as well and her known seropositive disease I recommended adding Humira to her regimen. This was ordered but she never started.     Last OV: 3/2018    Today's visit 6.4.2018:  Shira is overall doing well today. Her joints are not bothering her much - no new joint pain or swelling. EMS lasting less than an hour. Some knee pain with walking but this is due to her OA and she feels the injection I gave her this winter is still helping. She remains on MTX injection weekly and tolerating well as long as she's on folic acid and leucovorin. Never started the Humira. Did have repeat CTS injection in R wrist several months ago by sports med and does feel it has significantly helped (symptoms resolved). She is hoping this injection lasts longer now. 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 She is hoping this will turn into a full time job. She is using an ergonomic keyboard at work which also helps with wrists. Some chronic low back pain. Some pain in left heel still. Was told by doctor recently that her left heel pain may be radicular from her back. She will be having a spinal injection coming up. Notes ongoing chronic non-productive cough without hemoptysis. This really unchanged for a few years but is persistent. No new SOB. For past 3 wks has noticed new onset swelling equally in both legs. States she's had this before but this was when her RA was initially active and she had diffuse/full body/joint swelling. This is a little different. Has some pain in legs more  because of the swelling and associated stiffness, but doesn't seem to be active RA pain in actual joints.            Review of Systems:   Complete ROS negative except for symptoms mentioned in the HPI     No h/o  myalgia, unintentional weight loss, loss of appetite, fevers, swollen glands + fatigue, dyshidrotic eczema BL hands  No h/o gout  No family or personal history of psoriasis, UC or crohn's disease. No h/o iritis. No plantar fascitis. + L Nicole's tendon pain  Patient denies any raynauds, malar rash, photosensitivity, recurrent mouth/genital ulcers, sicca symptoms (+mild dry mouth), recurrent sinusitis/rhinitis  No h/o recurrent miscarriages or arterial/venous thrombosis   No h/o chest pain + SOB with exertion, chronic cough occasionally productive (no hemoptysis)  No h/o persistent nausea, vomiting, constipation, diarrhea, abdominal pain +2 episodes band-like abdominal pain resolved on own   No h/o hematochezia, hematuria, hemoptysis, hematemesis  No h/o seizures or strokes  No h/o cancer         Past Medical History:     Past Medical History:   Diagnosis Date     Chronic bronchitis (H) 07/30/2015     Contact dermatitis      Depressive disorder 1985     Eczema     HANDS     Elevated liver enzymes      H/O total knee replacement, left      History of total hip replacement 10/27/2011     Hyperlipidemia      Hypertension      Morbid obesity (H)      Osteoarthrosis     right knee     Sleep apnea      Tobacco use disorder      Past Surgical History:   Procedure Laterality Date     ARTHROPLASTY KNEE  6/14/2012    Procedure: ARTHROPLASTY KNEE;  Left Total Knee Arthroplasty;  Surgeon: Annette Quesada MD;  Location: [facility] OR     ARTHROSCOPY HIP Right      C TOTAL HIP ARTHROPLASTY  07/09/04    RT            Social History:     Social History     Occupational History     registered nurse      [employer]     Social History Main Topics     Smoking status: Current Every Day Smoker     Packs/day: 1.00     Years: 33.00      Types: Cigarettes     Start date: 1/1/1982     Smokeless tobacco: Never Used      Comment: maybe     Alcohol use 0.0 oz/week      Comment: occassional     Drug use: No     Sexual activity: No   works as a nurse, 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 - worked same job last year   Still smoking, but cut back to about 1 pack per week  Rare EtOH use         Family History:     Family History   Problem Relation Age of Onset     Thyroid Disease Mother      Hypertension Mother      Skin Cancer Mother      MMohs surgery with skin graft     CEREBROVASCULAR DISEASE Mother      CVA after endarderectomy     DIABETES Mother      Breast Cancer Paternal Grandmother      Pancreatic Cancer Paternal Grandmother      Cardiovascular Paternal Aunt      Cardiovascular Paternal Uncle      Depression Other      Alcoholism Father      Thyroid Disease Sister      Alcoholism Sister      Hypertension Maternal Grandmother      HEART DISEASE Sister      multiple ablations     Alcoholism Sister      Prostate Cancer Paternal Grandfather    Both mother and sister with thyroid disease, she is unsure if this is autoimmune     No fam hx of RA, SLE, type 1 DM         Allergies:     Allergies   Allergen Reactions     Adhesive Tape Blisters     Erythromycin Rash            Medications:     Current Outpatient Prescriptions   Medication Sig Dispense Refill     albuterol (PROAIR HFA/PROVENTIL HFA/VENTOLIN HFA) 108 (90 BASE) MCG/ACT Inhaler Inhale 2 puffs into the lungs every 6 hours as needed for shortness of breath / dyspnea or wheezing 1 Inhaler 0     cholecalciferol (VITAMIN D3) 70797 UNITS capsule Take 1 capsule (10,000 Units) by mouth daily 30 capsule 0     DULoxetine (CYMBALTA) 60 MG EC capsule TK ONE C PO  QAM  0     fluocinonide (LIDEX) 0.05 % ointment Apply topically daily To hands and feet when dermatitis active 60 g 2     folic acid (FOLVITE) 1 MG tablet Take 4 tablets (4 mg) by mouth daily Take 3-4 tablets daily 360 tablet 3  "    insulin syringe-needle U-100 (BD INSULIN SYRINGE ULTRAFINE) 30G X 1/2\" 1 ML For methotrexate use weekly 8 each prn     leucovorin (WELLCOVORIN) 5 MG tablet Take 1 tablet (5 mg) by mouth daily Once weekly, take 24h after taking weekly methotrexate dose 4 tablet 2     lisinopril (PRINIVIL/ZESTRIL) 10 MG tablet Take 1 tablet (10 mg) by mouth daily Needs to make an appointment with cardiology for further refills. 90 tablet 0     methotrexate 50 MG/2ML injection CHEMO Inject 0.8 mL (20 mg) weekly 4 mL 2     order for DME Equipment being ordered: [id number] $27  Heel Cup Tuli, RG 1 Device 0     order for DME Equipment being ordered: BIPAP  19/13 CM H20  AIRCURVE 10  AIRFIT F20  SN# [id number]   DN# [id number]              Physical Exam:   Blood pressure 148/77, pulse 97, temperature 97.9  F (36.6  C), temperature source Oral, height 1.676 m (5' 6\"), weight (!) 189.7 kg (418 lb 4.8 oz), SpO2 100 %, not currently breastfeeding.  Wt Readings from Last 4 Encounters:   06/04/18 (!) 189.7 kg (418 lb 4.8 oz)   04/12/18 (!) 185.1 kg (408 lb)   04/10/18 (!) 185.1 kg (408 lb)   03/05/18 (!) 185.4 kg (408 lb 12.8 oz)     BP Readings from Last 3 Encounters:   06/04/18 148/77   05/10/18 (!) 167/99   04/12/18 (!) 159/97     Constitutional: well-developed, appearing stated age  Eyes: PERRLA, normal conjunctiva, sclera  ENT: nl external ears, nose, lips. No mucous membrane lesions, normal saliva pool  Neck: no cervical or supraclavicular lymphadenopathy, no parotid swelling  Resp: breathing comfortably on room air. Decreased breath sounds posterior auscultation, possibly due to body habitus - but no overt crackles or wheezes  CV: RRR, no m/r/g, 2+ pitting BL LE edema   GI: soft, NT/ND, +BS, no HSM  Lymph: no cervical, supraclavicular or epitrochlear nodes  MSK: All joints including neck, shoulder, elbow, wrist, MCP/PIP/DIP, hip, knee, ankle, and foot MTP/PIP/DIP joints examined and w/o synovitis or deformity. Good  strength. No " synovitis. FROM. Very mild tenderness along joint lines of knees, but able to fully extend. No obvious knee effusions. Some pain over lower extremities and feet, but this is in setting of pitting edema and tenderness not localized to joints.   No dactylitis,  tenosynovitis  Skin: no nail pitting; no nodules. Well-healed vertical scar overlying left knee  Psych: nl judgement, orientation, memory, affect.  Neuro: CN II-XII grossly intact, moving all extremities equally, normal gait.         Data:     Results for orders placed or performed in visit on 05/10/18   X-ray Lumbar epidural injection    Narrative    L4-5 interlaminar epidural steroid injection    History: Lumbar spondylosis without myelopathy, spinal claudication,  low back pain.    Comparison: Lumbar spine MRI 4/20/2018    Procedure: The patient's prior MRI was carefully reviewed. After  discussing symptomatology with the patient and visualizing the  appropriate imaging studies, it was determined to utilize an  interlaminar approach on the left at the level of L4-5. After  obtaining verbal and written consent, the patient was placed prone on  the fluoroscopy table and the skin of the back was prepped and draped  in the normal sterile fashion. 1% lidocaine was used to anesthetize  the skin and subcutaneous tissues. Using fluoroscopic guidance, a 7  inch, 22-gauge spinal needle was advanced into the epidural space.  Approximately 2 cc of [id number] was injected to verify an epidural  location of the needle tip. After verifying an epidural location, a  mixture of 2 cc 0.5% bupivacaine, 1 cc of Depo-Medrol (80 mg/mL), and  3 cc of preservative-free saline was then injected into the epidural  space. The needle was removed with the stylet in place. The patient  tolerated the procedure well and there were no immediate  complications. The patient was observed for 15 minutes after the  procedure and left the department in good condition.    The patient reported a  1/10 pain scale rating prior to the procedure.  After the procedure, the patient rated the pain level as a  0/10.    Fluoroscopy time: 0.2 minutes      Impression    Impression:  Successful L4-5 interlaminar epidural steroid injection.    EDNA TOLEDO MD     RHEUM RESULTS Latest Ref Rng & Units 12/4/2017 3/5/2018 6/4/2018   SED RATE 0 - 30 mm/h 12 11 -   CRP, INFLAMMATION 0.0 - 8.0 mg/L 9.5(H) 7.1 -   RHEUMATOID FACTOR <20 IU/mL - - -   EMERY SCREEN BY EIA <1.0 - - -   AST 0 - 45 U/L 17 18 22   ALT 0 - 50 U/L 34 28 40   ALBUMIN 3.4 - 5.0 g/dL - 3.3(L) 3.3(L)   WBC 4.0 - 11.0 10e9/L 7.5 7.4 7.5   RBC 3.8 - 5.2 10e12/L 5.08 5.06 4.97   HGB 11.7 - 15.7 g/dL 16.0(H) 16.1(H) 15.8(H)   HCT 35.0 - 47.0 % 49.1(H) 48.8(H) 47.5(H)   MCV 78 - 100 fl 97 96 96   MCHC 31.5 - 36.5 g/dL 32.6 33.0 33.3   RDW 10.0 - 15.0 % 17.2(H) 13.9 13.7    - 450 10e9/L 242 235 214   CREATININE 0.52 - 1.04 mg/dL 0.58 0.52 0.56   GFR ESTIMATE, IF BLACK >60 mL/min/1.7m2 >90 >90 >90   GFR ESTIMATE >60 mL/min/1.7m2 >90 >90 >90    - 1620 mg/dL - - -   IGA 70 - 380 mg/dL - - -   IGM 60 - 265 mg/dL - - -   HEPATITIS C ANTIBODY NR - - -         EMG 1/2017 - severe R side and moderate L side median neuropathy at wrist c/w BL CTS    Recent BMP, CBC normal  TSH 12/2016 nl  UA 2012 nl other than 7 RBC  HCV negative  RF 38, CCP 9  CRP 11.3, ESR 10  Lyme Ab 1.57 (+), confirmatory IgG/IgM neg  EMERY neg    MRI c-spine - mild-mod DDD  XR BL knees 2011 - c/w OA  XR R knee 2017 - tricompartmental OA worse in medial compartment  XR BL hands/feet 8/2017: enthesopathy of L Nicole's tendon, mild DJD changes, no e/o erosions     EMG 1/2017:  Results:  Nerve conduction studies:  1. Right median-D2 sensory response is absent.    2. Left median-D2 sensory response shows moderately slowed CV and  moderately reduced amplitude.    3. Bilateral ulnar-D5 and radial sensory responses were normal.    4. Left median ulnar palmar interlatency difference  was  prolonged.    5. Right median-APB motor response showed moderately prolonged  DL, mildly reduced amplitude, and normal CV in the forearm.    6. Left median-APB motor response showed mildly prolonged DL,  normal amplitude, and normal CV in the forearm.    7. Bilateral ulnar-ADM motor responses were normal.    8. Right median-lumbrical compared to ulnar-interossei  interlatency difference was prolonged.     Needle EM. Fibrillation potentials and positive sharp waves were seen in  the right APB muscle.    2. Large amplitude and/or long duration motor unit potentials  (MUP) with increased polyphasia were seen in the bilateral APB  muscles.    3. Rapidly firing MUPs with reduced recruitment were seen in the  bilateral APB muscles.      Interpretation:  This is an abnormal study. There is electrophysiologic evidence  of a severe right-sided and a moderate left-sided median  neuropathy at the wrist (e.g., bilateral carpal tunnel syndrome).  Clinical correlation is recommended.    Reviewed all relevant data including labs and imaging.         Rosita Chandra MD

## 2018-06-04 NOTE — NURSING NOTE
"Chief Complaint   Patient presents with     RECHECK     3 month follow up     /77 (BP Location: Right arm, Patient Position: Sitting)  Pulse 97  Temp 97.9  F (36.6  C) (Oral)  Ht 1.676 m (5' 6\")  Wt (!) 189.7 kg (418 lb 4.8 oz)  SpO2 100%  BMI 67.52 kg/m2  pearl  " Patient is a 71y old  Female who presents with a chief complaint of shortness of breath (25 Jan 2018 18:44)      HPI:  72 yo female with PMH of Asthma with frequent exacerbation, systolic CHF, s/p AICD, aflutter on eliquis, T2DM, diverticulitis, gout, HTN, HLD, presents here with SOB.  Patient states that she has been having SOB and cough for a long time.  She was seen by her pulmonologist who prescribed her azithromycin which she did not start taking until 4 days ago.  She says with azithromycin her symptoms improved slightly.  This morning she went to her cardiologist office.  After returning home she started having worsening of her cough with thick yellow sputum.  She also has been having muscle spasms under her breast both sides.  She denies any fever.  Her daughter is also sick with cough and cold like symptoms.  She does have dyspnea with exertion, but denies orthopnea or PND. (23 Jan 2018 23:41)    ER vitals:  T 98, P 108, /78.  RVP (+) coronavirus.  Chest xray no consolidations.  Pt is off antibiotics.    REVIEW OF SYSTEMS:    CONSTITUTIONAL: No fever, weight loss, or fatigue  EYES: No eye pain, visual disturbances, or discharge  ENMT:  No sore throat  NECK: No pain or stiffness  RESPIRATORY: No cough, wheezing, chills or hemoptysis; No shortness of breath  CARDIOVASCULAR: No chest pain, palpitations, dizziness, or leg swelling  GASTROINTESTINAL: No abdominal or epigastric pain. No nausea, vomiting, or hematemesis; No diarrhea or constipation. No melena or hematochezia.  GENITOURINARY: No dysuria, frequency, hematuria, or incontinence  NEUROLOGICAL: No headaches, memory loss, loss of strength, numbness, or tremors  SKIN: No itching, burning, rashes, or lesions   LYMPH NODES: No enlarged glands  MUSCULOSKELETAL: No joint pain or swelling; No muscle, back, or extremity pain      PAST MEDICAL & SURGICAL HISTORY:  Atrial flutter  Diverticulitis  Cardiomyopathy  Kidney stone  COPD (chronic obstructive pulmonary disease)  Cardiac Pacemaker  HTN - Hypertension  Gout  Diabetes  Congestive Heart Failure  Asthma  S/P cholecystectomy  AICD (Automatic Cardioverter/Defibrillator) Present: inserted in Aug, 2008. Due for battery change in 1 month. ( [employer]) . Inserted by Dr Duffy      Allergies    Coreg (Other)  digoxin (Other; Short breath (Mild to Mod))  penicillins (Hives)  Solu-Medrol (Other)    Intolerances        FAMILY HISTORY:  Family history of brain tumor (Mother)      SOCIAL HISTORY:        MEDICATIONS  (STANDING):  ALBUTerol/ipratropium for Nebulization 3 milliLiter(s) Nebulizer every 6 hours  apixaban 5 milliGRAM(s) Oral every 12 hours  aspirin enteric coated 81 milliGRAM(s) Oral daily  atorvastatin 20 milliGRAM(s) Oral at bedtime  buDESOnide 160 MICROgram(s)/formoterol 4.5 MICROgram(s) Inhaler 2 Puff(s) Inhalation two times a day  dextrose 5%. 1000 milliLiter(s) (50 mL/Hr) IV Continuous <Continuous>  dextrose 50% Injectable 12.5 Gram(s) IV Push once  dextrose 50% Injectable 25 Gram(s) IV Push once  dextrose 50% Injectable 25 Gram(s) IV Push once  docusate sodium 100 milliGRAM(s) Oral three times a day  enalapril 10 milliGRAM(s) Oral two times a day  famotidine    Tablet 20 milliGRAM(s) Oral daily  furosemide    Tablet 20 milliGRAM(s) Oral daily  guaiFENesin   Syrup  (Sugar-Free) 100 milliGRAM(s) Oral every 6 hours  insulin glargine Injectable (LANTUS) 14 Unit(s) SubCutaneous at bedtime  insulin lispro (HumaLOG) corrective regimen sliding scale   SubCutaneous three times a day before meals  insulin lispro (HumaLOG) corrective regimen sliding scale   SubCutaneous at bedtime  insulin lispro Injectable (HumaLOG) 3 Unit(s) SubCutaneous three times a day before meals  montelukast 10 milliGRAM(s) Oral daily  predniSONE   Tablet 40 milliGRAM(s) Oral daily  senna 2 Tablet(s) Oral at bedtime  spironolactone 25 milliGRAM(s) Oral daily  tiotropium 18 MICROgram(s) Capsule 1 Capsule(s) Inhalation daily    MEDICATIONS  (PRN):  acetaminophen   Tablet 650 milliGRAM(s) Oral every 6 hours PRN fever, pain, headache  dextrose Gel 1 Dose(s) Oral once PRN Blood Glucose LESS THAN 70 milliGRAM(s)/deciliter  glucagon  Injectable 1 milliGRAM(s) IntraMuscular once PRN Glucose LESS THAN 70 milligrams/deciliter      Vital Signs Last 24 Hrs  T(C): 36.4 (25 Jan 2018 14:12), Max: 36.7 (25 Jan 2018 04:18)  T(F): 97.6 (25 Jan 2018 14:12), Max: 98.1 (25 Jan 2018 04:18)  HR: 103 (25 Jan 2018 14:12) (83 - 103)  BP: 125/78 (25 Jan 2018 14:12) (108/70 - 144/84)  BP(mean): --  RR: 17 (25 Jan 2018 14:12) (17 - 18)  SpO2: 94% (25 Jan 2018 14:12) (94% - 97%)    PHYSICAL EXAM:    GENERAL: NAD, well-groomed  HEAD:  Atraumatic, Normocephalic  EYES: EOMI, PERRLA, conjunctiva and sclera clear  ENMT: No tonsillar erythema, exudates, or enlargement; Moist mucous membranes  NECK: Supple, No JVD  CHEST/LUNG: Clear to percussion bilaterally; No rales, rhonchi, wheezing, or rubs  HEART: Regular rate and rhythm; No murmurs, rubs, or gallops  ABDOMEN: Soft, Nontender, Nondistended; Bowel sounds present  EXTREMITIES:  2+ Peripheral Pulses, No clubbing, cyanosis, or edema  LYMPH: No lymphadenopathy noted  SKIN: No rashes or lesions    LABS:  CBC Full  -  ( 25 Jan 2018 07:11 )  WBC Count : 8.6 K/uL  Hemoglobin : 12.2 g/dL  Hematocrit : 35.3 %  Platelet Count - Automated : 166 K/uL  Mean Cell Volume : 100.0 fl  Mean Cell Hemoglobin : 34.7 pg  Mean Cell Hemoglobin Concentration : 34.6 gm/dL  Auto Neutrophil # : x  Auto Lymphocyte # : x  Auto Monocyte # : x  Auto Eosinophil # : x  Auto Basophil # : x  Auto Neutrophil % : x  Auto Lymphocyte % : x  Auto Monocyte % : x  Auto Eosinophil % : x  Auto Basophil % : x      01-25    138  |  99  |  23  ----------------------------<  125<H>  4.3   |  27  |  1.09    Ca    9.9      25 Jan 2018 07:11  Phos  3.7     01-24  Mg     1.9     01-24    TPro  7.0  /  Alb  3.8  /  TBili  0.4  /  DBili  x   /  AST  19  /  ALT  18  /  AlkPhos  68  01-24      LIVER FUNCTIONS - ( 24 Jan 2018 06:48 )  Alb: 3.8 g/dL / Pro: 7.0 g/dL / ALK PHOS: 68 U/L / ALT: 18 U/L RC / AST: 19 U/L / GGT: x                 MICROBIOLOGY:    Culture - Blood (01.23.18 @ 21:45)    Specimen Source: .Blood Blood    Culture Results:   No growth to date.    Culture - Blood (01.23.18 @ 21:45)    Specimen Source: .Blood Blood    Culture Results:   No growth to date.    Rapid RVP Result: Detected: The [facility] RVP Rapid uses polymerase chain reaction (PCR) and melt  curve analysis to screen for adenovirus; coronavirus HKU1, NL63, 229E,  OC43; human metapneumovirus (hMPV); human enterovirus/rhinovirus  (Entero/RV); influenza A; influenza A/H1;influenza A/H3; influenza  A/H1-2009; influenza B; parainfluenza viruses 1, 2, 3, 4; respiratory  syncytial virus; Bordetella pertussis; Mycoplasma pneumoniae; and  Chlamydophila pneumoniae. (01.23.18 @ 18:46)          RADIOLOGY:    < from: Xray Chest 1 View AP- PORTABLE-Urgent (01.23.18 @ 18:31) >  FINDINGS:  Left-sided AICD. Heart size cannot be determined on this AP projection.    Low lung volumes. No focal area of consolidation. No pneumothorax or   pleural effusions.    IMPRESSION: No focal area of consolidation.    < end of copied text > Patient is a 71y old  Female who presents with a chief complaint of shortness of breath (25 Jan 2018 18:44)      HPI:  70 yo female with PMH of Asthma with frequent exacerbation, systolic CHF, s/p AICD, aflutter on eliquis, T2DM, diverticulitis, gout, HTN, HLD, presents here with SOB.  Patient states that she has been having SOB and cough for a long time.  She was seen by her pulmonologist who prescribed her azithromycin which she did not start taking until 4 days ago.  She says with azithromycin her symptoms improved slightly.  This morning she went to her cardiologist office.  After returning home she started having worsening of her cough with thick yellow sputum.  She also has been having muscle spasms under her breast both sides.  She denies any fever.  Her daughter is also sick with cough and cold like symptoms.  She does have dyspnea with exertion, but denies orthopnea or PND. (23 Jan 2018 23:41)    ER vitals:  T 98, P 108, /78.  RVP (+) coronavirus.  Chest xray no consolidations.  Pt is off antibiotics.    REVIEW OF SYSTEMS:    CONSTITUTIONAL: No fever, weight loss, or fatigue  EYES: No eye pain, visual disturbances, or discharge  ENMT:  No sore throat  NECK: No pain or stiffness  RESPIRATORY: (+) cough  CARDIOVASCULAR: No chest pain, palpitations, dizziness, or leg swelling  GASTROINTESTINAL: No abdominal or epigastric pain. No nausea, vomiting, or hematemesis; No diarrhea or constipation. No melena or hematochezia.  GENITOURINARY: No dysuria, frequency, hematuria, or incontinence  NEUROLOGICAL: No headaches, memory loss, loss of strength, numbness, or tremors  SKIN: No itching, burning, rashes, or lesions   LYMPH NODES: No enlarged glands  MUSCULOSKELETAL: No joint pain or swelling; No muscle, back, or extremity pain      PAST MEDICAL & SURGICAL HISTORY:  Atrial flutter  Diverticulitis  Cardiomyopathy  Kidney stone  COPD (chronic obstructive pulmonary disease)  Cardiac Pacemaker  HTN - Hypertension  Gout  Diabetes  Congestive Heart Failure  Asthma  S/P cholecystectomy  AICD (Automatic Cardioverter/Defibrillator) Present: inserted in Aug, 2008. Due for battery change in 1 month. ( [employer]) . Inserted by Dr Duffy      Allergies    Coreg (Other)  digoxin (Other; Short breath (Mild to Mod))  penicillins (Hives)  Solu-Medrol (Other)    Intolerances        FAMILY HISTORY:  Family history of brain tumor (Mother)      SOCIAL HISTORY:  No smoking, etoh, ivdu      MEDICATIONS  (STANDING):  ALBUTerol/ipratropium for Nebulization 3 milliLiter(s) Nebulizer every 6 hours  apixaban 5 milliGRAM(s) Oral every 12 hours  aspirin enteric coated 81 milliGRAM(s) Oral daily  atorvastatin 20 milliGRAM(s) Oral at bedtime  buDESOnide 160 MICROgram(s)/formoterol 4.5 MICROgram(s) Inhaler 2 Puff(s) Inhalation two times a day  dextrose 5%. 1000 milliLiter(s) (50 mL/Hr) IV Continuous <Continuous>  dextrose 50% Injectable 12.5 Gram(s) IV Push once  dextrose 50% Injectable 25 Gram(s) IV Push once  dextrose 50% Injectable 25 Gram(s) IV Push once  docusate sodium 100 milliGRAM(s) Oral three times a day  enalapril 10 milliGRAM(s) Oral two times a day  famotidine    Tablet 20 milliGRAM(s) Oral daily  furosemide    Tablet 20 milliGRAM(s) Oral daily  guaiFENesin   Syrup  (Sugar-Free) 100 milliGRAM(s) Oral every 6 hours  insulin glargine Injectable (LANTUS) 14 Unit(s) SubCutaneous at bedtime  insulin lispro (HumaLOG) corrective regimen sliding scale   SubCutaneous three times a day before meals  insulin lispro (HumaLOG) corrective regimen sliding scale   SubCutaneous at bedtime  insulin lispro Injectable (HumaLOG) 3 Unit(s) SubCutaneous three times a day before meals  montelukast 10 milliGRAM(s) Oral daily  predniSONE   Tablet 40 milliGRAM(s) Oral daily  senna 2 Tablet(s) Oral at bedtime  spironolactone 25 milliGRAM(s) Oral daily  tiotropium 18 MICROgram(s) Capsule 1 Capsule(s) Inhalation daily    MEDICATIONS  (PRN):  acetaminophen   Tablet 650 milliGRAM(s) Oral every 6 hours PRN fever, pain, headache  dextrose Gel 1 Dose(s) Oral once PRN Blood Glucose LESS THAN 70 milliGRAM(s)/deciliter  glucagon  Injectable 1 milliGRAM(s) IntraMuscular once PRN Glucose LESS THAN 70 milligrams/deciliter      Vital Signs Last 24 Hrs  T(C): 36.4 (25 Jan 2018 14:12), Max: 36.7 (25 Jan 2018 04:18)  T(F): 97.6 (25 Jan 2018 14:12), Max: 98.1 (25 Jan 2018 04:18)  HR: 103 (25 Jan 2018 14:12) (83 - 103)  BP: 125/78 (25 Jan 2018 14:12) (108/70 - 144/84)  BP(mean): --  RR: 17 (25 Jan 2018 14:12) (17 - 18)  SpO2: 94% (25 Jan 2018 14:12) (94% - 97%)    PHYSICAL EXAM:    GENERAL: NAD, well-groomed  HEAD:  Atraumatic, Normocephalic  EYES: EOMI, PERRLA, conjunctiva and sclera clear  ENMT: No tonsillar erythema, exudates, or enlargement; Moist mucous membranes  NECK: Supple, No JVD  CHEST/LUNG: ctab  HEART: Regular rate and rhythm; No murmurs, rubs, or gallops  ABDOMEN: Soft, Nontender, Nondistended; Bowel sounds present  EXTREMITIES:  2+ Peripheral Pulses, No clubbing, cyanosis, or edema  LYMPH: No lymphadenopathy noted  SKIN: No rashes or lesions    LABS:  CBC Full  -  ( 25 Jan 2018 07:11 )  WBC Count : 8.6 K/uL  Hemoglobin : 12.2 g/dL  Hematocrit : 35.3 %  Platelet Count - Automated : 166 K/uL  Mean Cell Volume : 100.0 fl  Mean Cell Hemoglobin : 34.7 pg  Mean Cell Hemoglobin Concentration : 34.6 gm/dL  Auto Neutrophil # : x  Auto Lymphocyte # : x  Auto Monocyte # : x  Auto Eosinophil # : x  Auto Basophil # : x  Auto Neutrophil % : x  Auto Lymphocyte % : x  Auto Monocyte % : x  Auto Eosinophil % : x  Auto Basophil % : x      01-25    138  |  99  |  23  ----------------------------<  125<H>  4.3   |  27  |  1.09    Ca    9.9      25 Jan 2018 07:11  Phos  3.7     01-24  Mg     1.9     01-24    TPro  7.0  /  Alb  3.8  /  TBili  0.4  /  DBili  x   /  AST  19  /  ALT  18  /  AlkPhos  68  01-24      LIVER FUNCTIONS - ( 24 Jan 2018 06:48 )  Alb: 3.8 g/dL / Pro: 7.0 g/dL / ALK PHOS: 68 U/L / ALT: 18 U/L RC / AST: 19 U/L / GGT: x                 MICROBIOLOGY:    Culture - Blood (01.23.18 @ 21:45)    Specimen Source: .Blood Blood    Culture Results:   No growth to date.    Culture - Blood (01.23.18 @ 21:45)    Specimen Source: .Blood Blood    Culture Results:   No growth to date.    Rapid RVP Result: Detected: The [employer] RVP Rapid uses polymerase chain reaction (PCR) and melt  curve analysis to screen for adenovirus; coronavirus HKU1, NL63, 229E,  OC43; human metapneumovirus (hMPV); human enterovirus/rhinovirus  (Entero/RV); influenza A; influenza A/H1;influenza A/H3; influenza  A/H1-2009; influenza B; parainfluenza viruses 1, 2, 3, 4; respiratory  syncytial virus; Bordetella pertussis; Mycoplasma pneumoniae; and  Chlamydophila pneumoniae. (01.23.18 @ 18:46)          RADIOLOGY:    < from: Xray Chest 1 View AP- PORTABLE-Urgent (01.23.18 @ 18:31) >  FINDINGS:  Left-sided AICD. Heart size cannot be determined on this AP projection.    Low lung volumes. No focal area of consolidation. No pneumothorax or   pleural effusions.    IMPRESSION: No focal area of consolidation.    < end of copied text >

## 2018-06-06 NOTE — PROGRESS NOTES
[facility] - Rheumatology Clinic Visit     Shira Rodriguez MRN# [id number]   YOB: 1960    Primary care provider: Anjel Busby  Jun 4, 2018          Assessment and Plan:       # seropositive non-erosive RA (+RF/CCP, dx 8/2017 with diffuse full body polyarticular inflammatory arthritis flare)  Disease in remission on exam today and by history. Currently on max dose SQ methotrexate. Prescribed Humira at her last visit but she never started this and has actually improved (following R CTS injection).   - continue MTX 20 mg subcu injection weekly  - continue FA 3 mg qd + leucovorin 5 mg weekly (given about 24h after her MTX weekly dose)  - q3 mo monitoring labs, due now  - OK to hold off on Humira - d/c'd from her med list for now    # chronic cough   # new onset BL LE edema, x 3 wks  # hypertension, persistent, not on medication  - repeat 2 view CXR since on MTX -> some pulmonary vascular congestion and cardiomegaly. No obvious findings to suggest MTX-induced lung injury  - repeat TTE given edema -> scheduled for 6/7  - needs BP under better control -> should return to PCP  - compression stockings     # BL CTS, R>>L. Resolved.   - received repeat R CTS injection via US guidance several months ago with ongoing good response. Can continue to receive these PRN. Hopefully now that RA under better control her CT injections will last longer and may not even need.  - should continue to wear wrist splints and using ergonomic keyboard pad at work    # L heel pain, prior XR showing enthesopathy of Nicole's tendon  - improving    # Osteoarthritis, s/p L TKA and R TANWY  # chronic low back pain  - still getting good response from 12/2017 R knee CS injection - can repeat as needed no sooner than q3m  - now s/p pool therapy winter 2018, would continue weight loss management strategies  - previously recommended offloading brace for R knee - she has not yet gotten this  - ongoing PT would be  useful    # ongoing tobacco abuse  - likely contributing to development of autoimmunity and ongoing systemic inflammation   - has cut from 1 -> 1/2 ppd       -- continue to work on cessation with primary care and mental health    # sicca symptoms. EMERY negative.   - primarily dry mouth. Likely SE to medications, can also be sicca/secondary Sjogren's associated with her RA  - drink frequent water throughout day, biotene products. Can consider secretagogues.          Other:  # morbid obesity - has met with bariatric surgery, not yet ready for surgery  # low pos Lyme Ab 1.57, confirmatory Western blot IgG/M neg - 12/2016  # numbness BL shins (L onset 2004 following L TKA, R onset 2015 following RLE injury)  # fam hx of thyroid disease ?autoimmune (mother, sister)  # dyshidrotic eczema BL hands - currently mildly active  # post-menopausal bleeding     Routine health maintenance:  HBVsAg: neg 4/2017  HBVcore: neg 4/2017  HCV: neg 4/2017  HIV: neg 9/2017  Q gold (or T spot): neg 8/2017   Vitamin D and calcium/bone health: recommend DEXA scan, has previously wanted to hold off  Immunizations: UTD flu, prevnar 13 8/2017, PPSV23 12/2017, shingrix TODAY 6/4/2018 and due for follow up vaccine at next rheum visit  Cancer screenings (skin, breast, colon, pap): due for colonoscopy    RTC in 4 mo with new fellow    Patient was seen and discussed with Dr. Myra Chandra MD  Rheumatology Fellow  Pager [phone]          Active Problem List:     Patient Active Problem List    Diagnosis Date Noted     CYRUS (obstructive sleep apnea) 01/04/2017     Priority: Medium     1/11/2017(401#)-AHI 80, RDI 90, lowest oxygen saturation was 83%, bilevel PAP 19/13 cm/H20 effective.   1/30/2016-AHI 16.6, lowest oxygen saturation was 89%       Elevated blood pressure (not hypertension) 01/04/2017     Priority: Medium     Degeneration of cervical intervertebral disc 12/26/2016     Priority: Medium     Arthritis of knee, degenerative  06/14/2012     Priority: Medium     Morbid obesity (H) 01/11/2012     Priority: Medium     Adjustment disorder with depressed mood 10/27/2011     Priority: Medium     Tobacco use disorder 10/27/2011     Priority: Medium     Primary localized osteoarthrosis, pelvic region and thigh 06/02/2004     Priority: Medium            History of Present Illness:     Chief Complaint   Patient presents with     RECHECK     3 month follow up     Rheumatological history:  Patient is a 58 year old female who was originally referred to rheumatology in 2/2017 by her PCP Dr. Busby for 3 months of BL hand pain pain thought consistent with CTS and positive RF/CCP. EMG showed moderate median neuropathy on L and severe on R, MRI c-spine normal. She received R CT injection by [facility] with 9 months of relief. She did describe intermittent episodes of swelling of her feet/ankles around this time period as well that resolved with medrol dose pack. She has a hx of L TKA and R TAWNY. She has chronic numbness of anterior BL shins following L TKA (was told they may have injected the nerve with marcaine around surgery years ago and numbness in R shin started in 2015 following a fall where developed large hematoma). When she was evaluated in 2/2017 she had no inflammatory arthritis symptoms and did not meet diagnosis of RA. We discussed starting plaquenil given it's role in potentially preventing pts with +RF/CCP from developing clinical RA, although she did not want to do this at the time. She was re-evaluated 8/2017 after developing polyarticular inflammatory arthritis of the right knee, ankles, wrists, hands, and shoulders. She was started on MTX, given depomedrol IM injection, oral prednisone, and given R knee CS injection. MTX increased and oral prednisone tapered to off in 9/2017. Had repeat R CT injection in 10/2017 that lasted until 1/2018. R knee significantly improved following CS injection 12/2017. Has persistent pain in L heel with  enthesopathy on imaging, unclear if had true enthesitis in this region due to her RA. Participated in pool therapy winter 2018 with good response. At her 3/2018 OV she continued to have significant pain in her R wrist thought due to CT, but given some ongoing persistent EMS (BL hand stiffness lasting ALL day) as well and her known seropositive disease I recommended adding Humira to her regimen. This was ordered but she never started.     Last OV: 3/2018    Today's visit 6.4.2018:  Shira is overall doing well today. Her joints are not bothering her much - no new joint pain or swelling. EMS lasting less than an hour. Some knee pain with walking but this is due to her OA and she feels the injection I gave her this winter is still helping. She remains on MTX injection weekly and tolerating well as long as she's on folic acid and leucovorin. Never started the Humira. Did have repeat CTS injection in R wrist several months ago by sports med and does feel it has significantly helped (symptoms resolved). She is hoping this injection lasts longer now. 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 She is hoping this will turn into a full time job. She is using an ergonomic keyboard at work which also helps with wrists. Some chronic low back pain. Some pain in left heel still. Was told by doctor recently that her left heel pain may be radicular from her back. She will be having a spinal injection coming up. Notes ongoing chronic non-productive cough without hemoptysis. This really unchanged for a few years but is persistent. No new SOB. For past 3 wks has noticed new onset swelling equally in both legs. States she's had this before but this was when her RA was initially active and she had diffuse/full body/joint swelling. This is a little different. Has some pain in legs more because of the swelling and associated stiffness, but doesn't seem to be active RA pain in actual  joints.            Review of Systems:   Complete ROS negative except for symptoms mentioned in the HPI     No h/o  myalgia, unintentional weight loss, loss of appetite, fevers, swollen glands + fatigue, dyshidrotic eczema BL hands  No h/o gout  No family or personal history of psoriasis, UC or crohn's disease. No h/o iritis. No plantar fascitis. + L Bisbee's tendon pain  Patient denies any raynauds, malar rash, photosensitivity, recurrent mouth/genital ulcers, sicca symptoms (+mild dry mouth), recurrent sinusitis/rhinitis  No h/o recurrent miscarriages or arterial/venous thrombosis   No h/o chest pain + SOB with exertion, chronic cough occasionally productive (no hemoptysis)  No h/o persistent nausea, vomiting, constipation, diarrhea, abdominal pain +2 episodes band-like abdominal pain resolved on own   No h/o hematochezia, hematuria, hemoptysis, hematemesis  No h/o seizures or strokes  No h/o cancer         Past Medical History:     Past Medical History:   Diagnosis Date     Chronic bronchitis (H) 07/30/2015     Contact dermatitis      Depressive disorder 1985     Eczema     HANDS     Elevated liver enzymes      H/O total knee replacement, left      History of total hip replacement 10/27/2011     Hyperlipidemia      Hypertension      Morbid obesity (H)      Osteoarthrosis     right knee     Sleep apnea      Tobacco use disorder      Past Surgical History:   Procedure Laterality Date     ARTHROPLASTY KNEE  6/14/2012    Procedure: ARTHROPLASTY KNEE;  Left Total Knee Arthroplasty;  Surgeon: Annette Quesada MD;  Location: [facility] OR     ARTHROSCOPY HIP Right      C TOTAL HIP ARTHROPLASTY  07/09/04    RT            Social History:     Social History     Occupational History     registered nurse      [employer]     Social History Main Topics     Smoking status: Current Every Day Smoker     Packs/day: 1.00     Years: 33.00     Types: Cigarettes     Start date: 1/1/1982     Smokeless tobacco: Never Used      Comment:  "maybe     Alcohol use 0.0 oz/week      Comment: occassional     Drug use: No     Sexual activity: No   works as a nurse, 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 - worked same job last year   Still smoking, but cut back to about 1 pack per week  Rare EtOH use         Family History:     Family History   Problem Relation Age of Onset     Thyroid Disease Mother      Hypertension Mother      Skin Cancer Mother      MMohs surgery with skin graft     CEREBROVASCULAR DISEASE Mother      CVA after endarderectomy     DIABETES Mother      Breast Cancer Paternal Grandmother      Pancreatic Cancer Paternal Grandmother      Cardiovascular Paternal Aunt      Cardiovascular Paternal Uncle      Depression Other      Alcoholism Father      Thyroid Disease Sister      Alcoholism Sister      Hypertension Maternal Grandmother      HEART DISEASE Sister      multiple ablations     Alcoholism Sister      Prostate Cancer Paternal Grandfather    Both mother and sister with thyroid disease, she is unsure if this is autoimmune     No fam hx of RA, SLE, type 1 DM         Allergies:     Allergies   Allergen Reactions     Adhesive Tape Blisters     Erythromycin Rash            Medications:     Current Outpatient Prescriptions   Medication Sig Dispense Refill     albuterol (PROAIR HFA/PROVENTIL HFA/VENTOLIN HFA) 108 (90 BASE) MCG/ACT Inhaler Inhale 2 puffs into the lungs every 6 hours as needed for shortness of breath / dyspnea or wheezing 1 Inhaler 0     cholecalciferol (VITAMIN D3) 91033 UNITS capsule Take 1 capsule (10,000 Units) by mouth daily 30 capsule 0     DULoxetine (CYMBALTA) 60 MG EC capsule TK ONE C PO  QAM  0     fluocinonide (LIDEX) 0.05 % ointment Apply topically daily To hands and feet when dermatitis active 60 g 2     folic acid (FOLVITE) 1 MG tablet Take 4 tablets (4 mg) by mouth daily Take 3-4 tablets daily 360 tablet 3     insulin syringe-needle U-100 (BD INSULIN SYRINGE ULTRAFINE) 30G X 1/2\" 1 ML For " "methotrexate use weekly 8 each prn     leucovorin (WELLCOVORIN) 5 MG tablet Take 1 tablet (5 mg) by mouth daily Once weekly, take 24h after taking weekly methotrexate dose 4 tablet 2     lisinopril (PRINIVIL/ZESTRIL) 10 MG tablet Take 1 tablet (10 mg) by mouth daily Needs to make an appointment with cardiology for further refills. 90 tablet 0     methotrexate 50 MG/2ML injection CHEMO Inject 0.8 mL (20 mg) weekly 4 mL 2     order for DME Equipment being ordered: [id number] $27  Heel Cup Tuli, RG 1 Device 0     order for DME Equipment being ordered: BIPAP  19/13 CM H20  AIRCURVE 10  AIRFIT F20  SN# [id number]   DN# [id number]              Physical Exam:   Blood pressure 148/77, pulse 97, temperature 97.9  F (36.6  C), temperature source Oral, height 1.676 m (5' 6\"), weight (!) 189.7 kg (418 lb 4.8 oz), SpO2 100 %, not currently breastfeeding.  Wt Readings from Last 4 Encounters:   06/04/18 (!) 189.7 kg (418 lb 4.8 oz)   04/12/18 (!) 185.1 kg (408 lb)   04/10/18 (!) 185.1 kg (408 lb)   03/05/18 (!) 185.4 kg (408 lb 12.8 oz)     BP Readings from Last 3 Encounters:   06/04/18 148/77   05/10/18 (!) 167/99   04/12/18 (!) 159/97     Constitutional: well-developed, appearing stated age  Eyes: PERRLA, normal conjunctiva, sclera  ENT: nl external ears, nose, lips. No mucous membrane lesions, normal saliva pool  Neck: no cervical or supraclavicular lymphadenopathy, no parotid swelling  Resp: breathing comfortably on room air. Decreased breath sounds posterior auscultation, possibly due to body habitus - but no overt crackles or wheezes  CV: RRR, no m/r/g, 2+ pitting BL LE edema   GI: soft, NT/ND, +BS, no HSM  Lymph: no cervical, supraclavicular or epitrochlear nodes  MSK: All joints including neck, shoulder, elbow, wrist, MCP/PIP/DIP, hip, knee, ankle, and foot MTP/PIP/DIP joints examined and w/o synovitis or deformity. Good  strength. No synovitis. FROM. Very mild tenderness along joint lines of knees, but able to fully " extend. No obvious knee effusions. Some pain over lower extremities and feet, but this is in setting of pitting edema and tenderness not localized to joints.   No dactylitis,  tenosynovitis  Skin: no nail pitting; no nodules. Well-healed vertical scar overlying left knee  Psych: nl judgement, orientation, memory, affect.  Neuro: CN II-XII grossly intact, moving all extremities equally, normal gait.         Data:     Results for orders placed or performed in visit on 05/10/18   X-ray Lumbar epidural injection    Narrative    L4-5 interlaminar epidural steroid injection    History: Lumbar spondylosis without myelopathy, spinal claudication,  low back pain.    Comparison: Lumbar spine MRI 4/20/2018    Procedure: The patient's prior MRI was carefully reviewed. After  discussing symptomatology with the patient and visualizing the  appropriate imaging studies, it was determined to utilize an  interlaminar approach on the left at the level of L4-5. After  obtaining verbal and written consent, the patient was placed prone on  the fluoroscopy table and the skin of the back was prepped and draped  in the normal sterile fashion. 1% lidocaine was used to anesthetize  the skin and subcutaneous tissues. Using fluoroscopic guidance, a 7  inch, 22-gauge spinal needle was advanced into the epidural space.  Approximately 2 cc of [id number] was injected to verify an epidural  location of the needle tip. After verifying an epidural location, a  mixture of 2 cc 0.5% bupivacaine, 1 cc of Depo-Medrol (80 mg/mL), and  3 cc of preservative-free saline was then injected into the epidural  space. The needle was removed with the stylet in place. The patient  tolerated the procedure well and there were no immediate  complications. The patient was observed for 15 minutes after the  procedure and left the department in good condition.    The patient reported a 1/10 pain scale rating prior to the procedure.  After the procedure, the patient  rated the pain level as a  0/10.    Fluoroscopy time: 0.2 minutes      Impression    Impression:  Successful L4-5 interlaminar epidural steroid injection.    EDNA TOLEDO MD     RHEUM RESULTS Latest Ref Rng & Units 12/4/2017 3/5/2018 6/4/2018   SED RATE 0 - 30 mm/h 12 11 -   CRP, INFLAMMATION 0.0 - 8.0 mg/L 9.5(H) 7.1 -   RHEUMATOID FACTOR <20 IU/mL - - -   EMERY SCREEN BY EIA <1.0 - - -   AST 0 - 45 U/L 17 18 22   ALT 0 - 50 U/L 34 28 40   ALBUMIN 3.4 - 5.0 g/dL - 3.3(L) 3.3(L)   WBC 4.0 - 11.0 10e9/L 7.5 7.4 7.5   RBC 3.8 - 5.2 10e12/L 5.08 5.06 4.97   HGB 11.7 - 15.7 g/dL 16.0(H) 16.1(H) 15.8(H)   HCT 35.0 - 47.0 % 49.1(H) 48.8(H) 47.5(H)   MCV 78 - 100 fl 97 96 96   MCHC 31.5 - 36.5 g/dL 32.6 33.0 33.3   RDW 10.0 - 15.0 % 17.2(H) 13.9 13.7    - 450 10e9/L 242 235 214   CREATININE 0.52 - 1.04 mg/dL 0.58 0.52 0.56   GFR ESTIMATE, IF BLACK >60 mL/min/1.7m2 >90 >90 >90   GFR ESTIMATE >60 mL/min/1.7m2 >90 >90 >90    - 1620 mg/dL - - -   IGA 70 - 380 mg/dL - - -   IGM 60 - 265 mg/dL - - -   HEPATITIS C ANTIBODY NR - - -         EMG 1/2017 - severe R side and moderate L side median neuropathy at wrist c/w BL CTS    Recent BMP, CBC normal  TSH 12/2016 nl  UA 2012 nl other than 7 RBC  HCV negative  RF 38, CCP 9  CRP 11.3, ESR 10  Lyme Ab 1.57 (+), confirmatory IgG/IgM neg  EMERY neg    MRI c-spine - mild-mod DDD  XR BL knees 2011 - c/w OA  XR R knee 2017 - tricompartmental OA worse in medial compartment  XR BL hands/feet 8/2017: enthesopathy of L Nicole's tendon, mild DJD changes, no e/o erosions     EMG 1/2017:  Results:  Nerve conduction studies:  1. Right median-D2 sensory response is absent.    2. Left median-D2 sensory response shows moderately slowed CV and  moderately reduced amplitude.    3. Bilateral ulnar-D5 and radial sensory responses were normal.    4. Left median ulnar palmar interlatency difference was  prolonged.    5. Right median-APB motor response showed moderately prolonged  DL, mildly  reduced amplitude, and normal CV in the forearm.    6. Left median-APB motor response showed mildly prolonged DL,  normal amplitude, and normal CV in the forearm.    7. Bilateral ulnar-ADM motor responses were normal.    8. Right median-lumbrical compared to ulnar-interossei  interlatency difference was prolonged.     Needle EM. Fibrillation potentials and positive sharp waves were seen in  the right APB muscle.    2. Large amplitude and/or long duration motor unit potentials  (MUP) with increased polyphasia were seen in the bilateral APB  muscles.    3. Rapidly firing MUPs with reduced recruitment were seen in the  bilateral APB muscles.      Interpretation:  This is an abnormal study. There is electrophysiologic evidence  of a severe right-sided and a moderate left-sided median  neuropathy at the wrist (e.g., bilateral carpal tunnel syndrome).  Clinical correlation is recommended.      Rosita Chandra MD    Reviewed all relevant data including labs and imaging.       I saw the patient with the fellow.  My exam and recomendations are as described.      Misael Renteria MD

## 2018-06-07 ENCOUNTER — RADIANT APPOINTMENT (OUTPATIENT)
Dept: CARDIOLOGY | Facility: CLINIC | Age: 58
End: 2018-06-07
Attending: INTERNAL MEDICINE
Payer: COMMERCIAL

## 2018-06-07 DIAGNOSIS — R60.0 EDEMA EXTREMITIES: ICD-10-CM

## 2018-06-07 DIAGNOSIS — R05.9 COUGH: ICD-10-CM

## 2018-06-15 ENCOUNTER — TELEPHONE (OUTPATIENT)
Dept: RHEUMATOLOGY | Facility: CLINIC | Age: 58
End: 2018-06-15

## 2018-06-15 NOTE — TELEPHONE ENCOUNTER
[facility] Call Center    Phone Message    May a detailed message be left on voicemail: yes    Reason for Call: Other: Please follow up with pt, she is returning Madelines call.     Action Taken: Message routed to:  [facility]

## 2018-06-15 NOTE — TELEPHONE ENCOUNTER
----- Message from Krystle Trevino LPN sent at 6/14/2018  2:36 PM CDT -----  Regarding: FW: results      ----- Message -----     From: Rosita Chandra MD     Sent: 6/14/2018  12:38 PM       To: Adult Rheum Triage-  Subject: results                                          Can someone please call Shira regarding the following -     - echocardiogram shows significant wall thickening of left ventricle of heart, consistent with long standing high blood pressure  - I recommend she restart the lisinopril 10 mg daily she was previously on. I can't remember if she said she had a cough when she was on this and it is not listed as an allergy - if she does confirm she had a cough while on lisinopril, let's start losartan 50 mg once daily. Please let me know where she wants prescription filled    - I would encourage her to be fit for compression stockings for the leg swelling now. I did order these in case she needed an actual order. We can [facility] where she can be fit for them     - I saw that she isn't seeing Dr. Busby until end of this month. I see that she sent him a 800razorst message asking for prednisone for swelling in legs. Does she think she is having actual joint pain or just stiffness from the amount of fluid? If she thinks she is having joint pain we could do short course prednisone 15 mg qd x3d, 10 mg qd x3d, 5 mg qd x3d. Caution her that prednisone itself can cause swelling however.     Thanks  Emma

## 2018-06-26 NOTE — TELEPHONE ENCOUNTER
Pt is unable to answer phone during day, she does request my chart message be sent.  I have done so.  Will close this encounter.    Ashli Barth RN  Rheumatology Clinic

## 2018-06-26 NOTE — TELEPHONE ENCOUNTER
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      Action Taken: Message routed to:  [facility] Rheum

## 2018-06-27 ENCOUNTER — OFFICE VISIT (OUTPATIENT)
Dept: FAMILY MEDICINE | Facility: CLINIC | Age: 58
End: 2018-06-27
Payer: COMMERCIAL

## 2018-06-27 VITALS
WEIGHT: 293 LBS | RESPIRATION RATE: 12 BRPM | BODY MASS INDEX: 68.29 KG/M2 | HEART RATE: 67 BPM | DIASTOLIC BLOOD PRESSURE: 75 MMHG | SYSTOLIC BLOOD PRESSURE: 151 MMHG

## 2018-06-27 DIAGNOSIS — Z00.00 ROUTINE GENERAL MEDICAL EXAMINATION AT A HEALTH CARE FACILITY: Primary | ICD-10-CM

## 2018-06-27 DIAGNOSIS — R06.09 DOE (DYSPNEA ON EXERTION): ICD-10-CM

## 2018-06-27 DIAGNOSIS — I10 ESSENTIAL HYPERTENSION: ICD-10-CM

## 2018-06-27 DIAGNOSIS — G47.33 OSA (OBSTRUCTIVE SLEEP APNEA): ICD-10-CM

## 2018-06-27 DIAGNOSIS — R79.89 ELEVATED PARATHYROID HORMONE: ICD-10-CM

## 2018-06-27 RX ORDER — LISINOPRIL 10 MG/1
10 TABLET ORAL DAILY
Qty: 90 TABLET | Refills: 3 | Status: SHIPPED | OUTPATIENT
Start: 2018-06-27 | End: 2019-07-13

## 2018-06-27 RX ORDER — METHYLPREDNISOLONE 4 MG
TABLET, DOSE PACK ORAL
Qty: 21 TABLET | Refills: 0 | Status: ON HOLD | OUTPATIENT
Start: 2018-06-27 | End: 2019-10-07

## 2018-06-27 ASSESSMENT — PAIN SCALES - GENERAL: PAINLEVEL: SEVERE PAIN (6)

## 2018-06-27 NOTE — PATIENT INSTRUCTIONS
[facility]: [phone]     [facility] Medication Refill Request Information:  * Please contact your pharmacy regarding ANY request for medication refills.  ** [facility] Prescription Fax = [phone]  * Please allow 3 business days for routine medication refills.  * Please allow 5 business days for controlled substance medication refills.     [facility] Test Result notification information:  *You will be notified with in 7-10 days of your appointment day regarding the results of your test.  If you are on MyChart you will be notified as soon as the provider has reviewed the results and signed off on them.     Cardiology [phone]  Mammogram [phone]  Sleep Clinic [phone]

## 2018-06-27 NOTE — NURSING NOTE
Chief Complaint   Patient presents with     Swelling     patient states feet swelling x 1 month     TOSIN ROSE at 7:07 AM on 6/27/2018.

## 2018-06-27 NOTE — MR AVS SNAPSHOT
After Visit Summary   6/27/2018    Shira Rodriguez    MRN: [id number]           Patient Information     Date Of Birth          1960        Visit Information        Provider Department      6/27/2018 7:05 AM Anjel Busby MD [facility]        Today's Diagnoses     Routine general medical examination at a health care facility    -  1    Essential hypertension        SANTOS (dyspnea on exertion)        CYRUS (obstructive sleep apnea)        Elevated parathyroid hormone          Care Instructions    [facility]: [phone]     Primary Care Center Medication Refill Request Information:  * Please contact your pharmacy regarding ANY request for medication refills.  ** [facility] Prescription Fax = [phone]  * Please allow 3 business days for routine medication refills.  * Please allow 5 business days for controlled substance medication refills.     Primary Care Center Test Result notification information:  *You will be notified with in 7-10 days of your appointment day regarding the results of your test.  If you are on MyChart you will be notified as soon as the provider has reviewed the results and signed off on them.     Cardiology [phone]  Mammogram [phone]  Sleep Clinic [phone]          Follow-ups after your visit        Additional Services     CARDIOLOGY EVAL ADULT REFERRAL       Preferred location:  [facility] [phone]   [email or web address]    Please be aware that coverage of these services is subject to the terms and limitations of your health insurance plan.  Call member services at your health plan with any benefit or coverage questions.      Please bring the following to your appointment:  Any x-rays, CTs or MRIs which have been performed. Contact the facility where they were done to arrange for  prior to your scheduled appointment.    List of current  medications  This referral request   Any documents/labs given to you for this referral            SLEEP EVALUATION & MANAGEMENT REFERRAL - ADULT -Other (Respond in commments)       Please be aware that coverage of these services is subject to the terms and limitations of your health insurance plan.  Call member services at your health plan with any benefit or coverage questions.      Please bring the following to your appointment:    >>   List of current medications   >>   This referral request   >>   Any documents/labs given to you for this referral                      Your next 10 appointments already scheduled     Oct 03, 2018  7:00 AM CDT   (Arrive by 6:45 AM)   Return Visit with Everett Austin MD   [facility] ([facility])    [address]  [address]  [place] [address] [phone]              Future tests that were ordered for you today     Open Future Orders        Priority Expected Expires Ordered    Lipid panel reflex to direct LDL Fasting Routine 6/27/2018 7/11/2018 6/27/2018    Hemoglobin A1c Routine 6/27/2018 7/11/2018 6/27/2018    Mammogram, routine screening Routine  6/27/2019 6/27/2018    Parathyroid Hormone Intact Routine 6/27/2018 6/27/2019 6/27/2018    SLEEP EVALUATION & MANAGEMENT REFERRAL - ADULT -Other (Respond in commments) Routine  6/27/2019 6/27/2018            Who to contact     Please call your clinic at [phone] to:    Ask questions about your health    Make or cancel appointments    Discuss your medicines    Learn about your test results    Speak to your doctor            Additional Information About Your Visit        [employer] Information     [employer] gives you secure access to your electronic health record. If you see a primary care provider, you can also send messages to your care team and make appointments. If you have questions, please call your primary care clinic.  If you do not have a primary care provider, please call  [phone] and they will assist you.      [employer] is an electronic gateway that provides easy, online access to your medical records. With [employer], you can request a clinic appointment, read your test results, renew a prescription or communicate with your care team.     To access your existing account, please contact your [facility] or call [phone] for assistance.        Care EveryWhere ID     This is your Care EveryWhere ID. This could be used by other organizations to access your [place] medical records  [id number]        Your Vitals Were     Pulse Respirations BMI (Body Mass Index)             67 12 68.29 kg/m2          Blood Pressure from Last 3 Encounters:   06/27/18 151/75   06/04/18 148/77   05/10/18 (!) 167/99    Weight from Last 3 Encounters:   06/27/18 (!) 191.9 kg (423 lb 1.6 oz)   06/04/18 (!) 189.7 kg (418 lb 4.8 oz)   04/12/18 (!) 185.1 kg (408 lb)              We Performed the Following     CARDIOLOGY EVAL ADULT REFERRAL          Today's Medication Changes          These changes are accurate as of 6/27/18  7:57 AM.  If you have any questions, ask your nurse or doctor.               Start taking these medicines.        Dose/Directions    methylPREDNISolone 4 MG tablet   Commonly known as:  MEDROL DOSEPAK   Used for:  SANTOS (dyspnea on exertion)   Started by:  Anjel Busby MD        Follow package instructions   Quantity:  21 tablet   Refills:  0         These medicines have changed or have updated prescriptions.        Dose/Directions    lisinopril 10 MG tablet   Commonly known as:  PRINIVIL/ZESTRIL   This may have changed:  additional instructions   Used for:  Essential hypertension   Changed by:  Anjel Busby MD        Dose:  10 mg   Take 1 tablet (10 mg) by mouth daily   Quantity:  90 tablet   Refills:  3         Stop taking these medicines if you haven't already. Please contact your care team if you have questions.     cholecalciferol 14171 units  capsule   Commonly known as:  vitamin D3   Stopped by:  Anjel Busby MD           order for DME   Stopped by:  Anjel Busby MD           order for DME   Stopped by:  Anjel Busby MD                Where to get your medicines      These medications were sent to [facility] - [place], [place] - [address] AT [facility] OF  & [facility]  [address] [place] [address]     Phone:  [phone]     lisinopril 10 MG tablet    methylPREDNISolone 4 MG tablet                Primary Care Provider Office Phone # Fax #    Anjel Busby [phone]       [address] [address]        Equal Access to Services     VARSHA FLOREZ : Hadii dionisio jackman hadasho Soomaali, waaxda luqadaha, qaybta kaalmada adeegyada, waxay haydenin hayarabella herring . So [facility] [phone].    ATENCIÓN: Si habla español, tiene a samano disposición servicios gratuitos de asistencia lingüística. [facility] [phone].    We comply with applicable federal civil rights laws and [place] laws. We do not discriminate on the basis of race, color, national origin, age, disability, sex, sexual orientation, or gender identity.            Thank you!     Thank you for choosing [facility]  for your care. Our goal is always to provide you with excellent care. Hearing back from our patients is one way we can continue to improve our services. Please take a few minutes to complete the written survey that you may receive in the mail after your visit with us. Thank you!             Your Updated Medication List - Protect others around you: Learn how to safely use, store and throw away your medicines at www.disposemymeds.org.          This list is accurate as of 6/27/18  7:57 AM.  Always use your most recent med list.                   Brand Name Dispense Instructions for use Diagnosis    albuterol 108 (90 Base) MCG/ACT Inhaler    PROAIR  "HFA/PROVENTIL HFA/VENTOLIN HFA    1 Inhaler    Inhale 2 puffs into the lungs every 6 hours as needed for shortness of breath / dyspnea or wheezing    SANTOS (dyspnea on exertion)       DULoxetine 60 MG EC capsule    CYMBALTA     TK ONE C PO  QAM        fluocinonide 0.05 % ointment    LIDEX    60 g    Apply topically daily To hands and feet when dermatitis active    Dermatitis       folic acid 1 MG tablet    FOLVITE    360 tablet    Take 4 tablets (4 mg) by mouth daily Take 3-4 tablets daily    High risk medication use, Rheumatoid arthritis involving multiple sites with positive rheumatoid factor (H)       insulin syringe-needle U-100 30G X 1/2\" 1 ML    BD insulin syringe ultrafine    8 each    For methotrexate use weekly    Rheumatoid arthritis involving multiple sites with positive rheumatoid factor (H)       leucovorin 5 MG tablet    WELLCOVORIN    4 tablet    Take 1 tablet (5 mg) by mouth daily Once weekly, take 24h after taking weekly methotrexate dose    Adverse effect of methotrexate, initial encounter       lisinopril 10 MG tablet    PRINIVIL/ZESTRIL    90 tablet    Take 1 tablet (10 mg) by mouth daily    Essential hypertension       methotrexate 50 MG/2ML injection CHEMO     4 mL    Inject 0.8 mL (20 mg) weekly    Rheumatoid arthritis involving multiple sites with positive rheumatoid factor (H)       methylPREDNISolone 4 MG tablet    MEDROL DOSEPAK    21 tablet    Follow package instructions    SANTOS (dyspnea on exertion)         "

## 2018-06-27 NOTE — PROGRESS NOTES
"[facility]  Primary Care [place]   Anjel Busby MD  06/27/2018      Chief Complaint:   Swelling       History of Present Illness:   Shira Rodriguez is a 58 year old female with a history of osteoarthritis, chronic bronchitis, and hypertension who presents alone for evaluation of swelling.    Swelling: The patient reports swelling in her feet and ankles which has gotten worse in past month. She states the swelling can get painful and describes the skin on her feet and ankles as feeling tight. She is not currently taking Prednisone. Of note, she was previously placed on Prednisone which did reduce her swelling when she had problems with this in the past. She states having Achilles tendon pain today as well. She previously met with sports medicine to address this. At her appointment she was told this pain is likely due to a back problem.     Hypertension: The patient's past echocardiograms showed significant wall thickening of left ventricle of heart, consistent with long standing high blood pressure. She has not been taking her Lisinopril and acknowledges that she needs to start taking this again. She was also advised to try compression stockings which she has previously tried during a hospital stay but states she \"hates them\".     Respiratory: The patient has seen a pulmonologist and has chronic bronchitis. She is a current smoker but states she has not been smoking as much as she used to due to her recent move to a new apartment. She reports getting out of breath when she walks but states this is nothing new. She denies shortness of breath in bed.     Rheumatoid: The patient has been seeing Dr. Chandra of rheumatology since 02/2017 for bilateral hand pain. 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 She received a CS injection 12/2017 which significantly improved her right knee inflammation. Several months ago she had a repeat " CTS injection in her right wrist by sports medicine which significantly helped. Her last visit was 6/4. She is currently on Methotrexate to manage her Rheumatoid.     Bariatrics: The patient met with the bariatric clinic in the past. She had a vitamin D deficiency and began taking a supplement for this but has since stopped as her last blood test showed her vitamin D levels had normalized. She also met with a dietician but did not wish to move forward with this as she did not feel she could handle all of the changes at that point in her life as she was dealing with rheumatoid, depression, and liver problems.     Healthcare/Maintenance: The patient is due for routine mammogram. She is also due for a colonoscopy and states she will do a stool sample soon. She is up to date on OB GYN. She has sleep apnea which is not being treated. She is currently seeing a psychologist and psychiatrist to manage her depression. Her psychiatrist is prescribing her Duloxetine to manage this. She also reports being inactive for the past two years due to her depression, pain, and arthritis which she acknowledges have contributed to her weight gain.     Other concerns:  1. Shingrix-riecieved    Review of Systems:   Pertinent items are noted in HPI, remainder of complete ROS is negative.      Active Medications:      albuterol (PROAIR HFA/PROVENTIL HFA/VENTOLIN HFA) 108 (90 BASE) MCG/ACT Inhaler, Inhale 2 puffs into the lungs every 6 hours as needed for shortness of breath / dyspnea or wheezing, Disp: 1 Inhaler, Rfl: 0     cholecalciferol (VITAMIN D3) 91018 UNITS capsule, Take 1 capsule (10,000 Units) by mouth daily, Disp: 30 capsule, Rfl: 0     DULoxetine (CYMBALTA) 60 MG EC capsule, TK ONE C PO  QAM, Disp: , Rfl: 0     fluocinonide (LIDEX) 0.05 % ointment, Apply topically daily To hands and feet when dermatitis active, Disp: 60 g, Rfl: 2     folic acid (FOLVITE) 1 MG tablet, Take 4 tablets (4 mg) by mouth daily Take 3-4 tablets daily,  Disp: 360 tablet, Rfl: 3     leucovorin (WELLCOVORIN) 5 MG tablet, Take 1 tablet (5 mg) by mouth daily Once weekly, take 24h after taking weekly methotrexate dose, Disp: 4 tablet, Rfl: 2     lisinopril (PRINIVIL/ZESTRIL) 10 MG tablet, Take 1 tablet (10 mg) by mouth daily Needs to make an appointment with cardiology for further refills., Disp: 90 tablet, Rfl: 0     methotrexate 50 MG/2ML injection CHEMO, Inject 0.8 mL (20 mg) weekly, Disp: 4 mL, Rfl: 2     Allergies:   Adhesive tape  Erythromycin      Past Medical History:  Chronic bronchitis  Contact dermatitis  Depressive disorder  Eczema  Elevated liver enzymes  Hyperlipidemia  Hypertension  Morbid obesity  Osteoarthritis  Obstructive sleep apnea  Degeneration of cervical intervertebral disc  Tobacco use disorder     Past Surgical History:  Arthroplasty, left knee  Arthroscopy, right hip  C Total hip arthroplasty, right     Family History:   Thyroid disease (mother)  Hypertension (mother)  Skin cancer (mother)  Cerebrovascular disease (mother)  Diabetes (mother)  Breast cancer (paternal grandmother)  Pancreatic cancer (paternal grandmother)  Cardiovascular disease (paternal aunt, paternal uncle)  Depression (father)  Alcoholism (sister)  Prostate cancer (paternal grandfather)      Social History:   The patient is single, a current every day smoker (1 pack per day), and occasionally consumes alcohol.        Physical Exam:   /75  Pulse 67  Resp 12  Wt (!) 191.9 kg (423 lb 1.6 oz)  BMI 68.29 kg/m2   Constitutional: Alert. In no distress.  Cardiovascular: RRR. No murmurs, clicks, gallops, or rub.  Respiratory: Clear to auscultation bilaterally, no wheezes or crackles.  Musculoskeletal: Extremities normal. No gross deformities noted. Normal muscle tone. 2+ bilateral edema.   Psychiatric: Mentation appears normal. Normal affect.          Assessment and Plan:  1. SANTOS (dyspnea on exertion)  Empiric trial Prednisone. Also visit with cardiology to see if she may  have diastole-dysfunction or pulmonary hypertension.   - methylPREDNISolone (MEDROL DOSEPAK) 4 MG tablet  Dispense: 21 tablet; Refill: 0  - CARDIOLOGY EVAL ADULT REFERRAL    2. Essential hypertension  Resume Lisinopril, monitor blood pressure at home, see me after visit with cardiology.   - lisinopril (PRINIVIL/ZESTRIL) 10 MG tablet  Dispense: 90 tablet; Refill: 3  - Lipid panel reflex to direct LDL Fasting  - Hemoglobin A1c    3. Routine general medical examination at a health care facility   Continue with outside GYN provider. Will do at home stool test for colon cancer screening   - Mammogram, routine screening    4. CYRUS (obstructive sleep apnea)  Discussed untreated CYRUS could factor into high blood pressure and heart problems. Encouraged follow up with sleep clinic for possible treatment.   - SLEEP EVALUATION & MANAGEMENT REFERRAL - ADULT -Other (Respond in commments)    5. Elevated parathyroid hormone  Likely this will be improved given vitamin D was normalized but this needs to be checked  - Parathyroid Hormone Intact    6. Edema  Sleep and heart evaluations as above, empiric trial Prednisone as she knows in the past this has given relief.    7. Rheumatoid arthritis  Follows closely with specialist    8. Mood disturbance  Follows with outside mental health specialist    9. Obesity  Worked up part way with our clinic and may resume when her life stabilizes, recently moved and is in process of starting new job which she is really enjoying.      Follow-up: Patient should follow up in clinic once cardiology visit is completed to review these results.         Scribe Disclosure:   I, Gladis Lovell, am serving as a scribe to document services personally performed by Anjel Busby MD at this visit, based upon the provider's statements to me. All documentation has been reviewed by the aforementioned provider prior to being entered into the official medical record.     Portions of this medical record were  completed by a scribe. UPON MY REVIEW AND AUTHENTICATION BY ELECTRONIC SIGNATURE, this confirms (a) I performed the applicable clinical services, and (b) the record is accurate.   Anjel Busby MD

## 2018-07-06 ENCOUNTER — MYC MEDICAL ADVICE (OUTPATIENT)
Dept: FAMILY MEDICINE | Facility: CLINIC | Age: 58
End: 2018-07-06

## 2018-07-11 NOTE — TELEPHONE ENCOUNTER
Form has been placed in Dr. Busby's inbox.    July 12, 2018 4:21 PM  Form has been completed and signed. Sent back to patient. Patient has also been informed about this.

## 2018-09-25 ENCOUNTER — MYC MEDICAL ADVICE (OUTPATIENT)
Dept: ORTHOPEDICS | Facility: CLINIC | Age: 58
End: 2018-09-25

## 2018-09-25 NOTE — TELEPHONE ENCOUNTER
I spoke with Shira and scheduled her for a follow up appointment on October 9 at 700am with Dr Saxena. Shira did not have any further questions at this time.    Brodie Marcial  Procedure , [place]  Peds Specialty and Adult Endocrinology

## 2018-10-03 ENCOUNTER — OFFICE VISIT (OUTPATIENT)
Dept: RHEUMATOLOGY | Facility: CLINIC | Age: 58
End: 2018-10-03
Attending: INTERNAL MEDICINE
Payer: COMMERCIAL

## 2018-10-03 VITALS
OXYGEN SATURATION: 94 % | DIASTOLIC BLOOD PRESSURE: 108 MMHG | SYSTOLIC BLOOD PRESSURE: 174 MMHG | HEART RATE: 103 BPM | WEIGHT: 293 LBS | TEMPERATURE: 97.8 F | BODY MASS INDEX: 47.09 KG/M2 | HEIGHT: 66 IN

## 2018-10-03 DIAGNOSIS — M05.741 RHEUMATOID ARTHRITIS INVOLVING BOTH HANDS WITH POSITIVE RHEUMATOID FACTOR (H): Primary | ICD-10-CM

## 2018-10-03 DIAGNOSIS — M05.742 RHEUMATOID ARTHRITIS INVOLVING BOTH HANDS WITH POSITIVE RHEUMATOID FACTOR (H): Primary | ICD-10-CM

## 2018-10-03 DIAGNOSIS — Z79.899 HIGH RISK MEDICATION USE: ICD-10-CM

## 2018-10-03 DIAGNOSIS — M05.79 RHEUMATOID ARTHRITIS INVOLVING MULTIPLE SITES WITH POSITIVE RHEUMATOID FACTOR (H): ICD-10-CM

## 2018-10-03 DIAGNOSIS — T45.1X5A ADVERSE EFFECT OF METHOTREXATE, INITIAL ENCOUNTER: ICD-10-CM

## 2018-10-03 PROCEDURE — 25000128 H RX IP 250 OP 636: Mod: ZF | Performed by: INTERNAL MEDICINE

## 2018-10-03 PROCEDURE — G0463 HOSPITAL OUTPT CLINIC VISIT: HCPCS | Mod: ZF

## 2018-10-03 PROCEDURE — 90686 IIV4 VACC NO PRSV 0.5 ML IM: CPT | Mod: ZF | Performed by: INTERNAL MEDICINE

## 2018-10-03 PROCEDURE — G0008 ADMIN INFLUENZA VIRUS VAC: HCPCS | Mod: ZF

## 2018-10-03 RX ORDER — FOLIC ACID 1 MG/1
4 TABLET ORAL DAILY
Qty: 360 TABLET | Refills: 3 | Status: SHIPPED | OUTPATIENT
Start: 2018-10-03 | End: 2019-06-26

## 2018-10-03 RX ORDER — METHOTREXATE 25 MG/ML
INJECTION, SOLUTION INTRA-ARTERIAL; INTRAMUSCULAR; INTRAVENOUS
Qty: 4 ML | Refills: 2 | Status: SHIPPED | OUTPATIENT
Start: 2018-10-03 | End: 2019-01-10

## 2018-10-03 RX ORDER — SYRINGE-NEEDLE,INSULIN,0.5 ML 27GX1/2"
SYRINGE, EMPTY DISPOSABLE MISCELLANEOUS
Qty: 8 EACH | Status: SHIPPED | OUTPATIENT
Start: 2018-10-03 | End: 2019-10-23

## 2018-10-03 RX ORDER — LEUCOVORIN CALCIUM 5 MG/1
5 TABLET ORAL DAILY
Qty: 4 TABLET | Refills: 2 | Status: SHIPPED | OUTPATIENT
Start: 2018-10-03 | End: 2019-03-26

## 2018-10-03 RX ADMIN — INFLUENZA A VIRUS A/MICHIGAN/45/2015 X-275 (H1N1) ANTIGEN (FORMALDEHYDE INACTIVATED), INFLUENZA A VIRUS A/SINGAPORE/INFIMH-16-0019/2016 IVR-186 (H3N2) ANTIGEN (FORMALDEHYDE INACTIVATED), INFLUENZA B VIRUS B/PHUKET/3073/2013 ANTIGEN (FORMALDEHYDE INACTIVATED), AND INFLUENZA B VIRUS B/MARYLAND/15/2016 BX-69A ANTIGEN (FORMALDEHYDE INACTIVATED) 0.5 ML: 15; 15; 15; 15 INJECTION, SUSPENSION INTRAMUSCULAR at 07:58

## 2018-10-03 ASSESSMENT — PAIN SCALES - GENERAL: PAINLEVEL: MODERATE PAIN (5)

## 2018-10-03 NOTE — MR AVS SNAPSHOT
After Visit Summary   10/3/2018    Shira Rodriguez    MRN: [id number]           Patient Information     Date Of Birth          1960        Visit Information        Provider Department      10/3/2018 7:00 AM Everett Austin MD [facility]        Today's Diagnoses     Rheumatoid arthritis involving both hands with positive rheumatoid factor (H)    -  1    Rheumatoid arthritis involving multiple sites with positive rheumatoid factor (H)        Adverse effect of methotrexate, initial encounter        High risk medication use          Care Instructions    Dx  Rheumatoid arthritis: stable; continue methotrexate 0.8 ml each week; leukovorin, folate.  Labs every 3 months.          Follow-ups after your visit        Follow-up notes from your care team     Return in about 4 months (around 2/3/2019).      Your next 10 appointments already scheduled     Oct 09, 2018  7:00 AM CDT   Return Visit with Tashi Saxena MD   [facility] ([facility])    [address] [place] [id number]   [phone]            Feb 06, 2019  7:00 AM CST   (Arrive by 6:45 AM)   Return Visit with Everett Austin MD   [facility] ([facility])    [address] [place] [address]  [address] [place] [id number] [phone]              Future tests that were ordered for you today     Open Future Orders        Priority Expected Expires Ordered    Creatinine Routine  10/3/2019 10/3/2018    CBC with platelets differential Routine  10/3/2019 10/3/2018    AST Routine  10/3/2019 10/3/2018    ALT Routine  10/3/2019 10/3/2018    Albumin level Routine  10/3/2019 10/3/2018    Parathyroid Hormone Intact Routine  10/3/2019 10/3/2018    Hemoglobin A1c Routine  10/3/2019 10/3/2018    Lipid panel reflex to direct LDL Fasting Routine  10/3/2019 10/3/2018            Who to contact     If you have questions or need follow up information about today's  "clinic visit or your schedule please contact [facility] directly at [phone].  Normal or non-critical lab and imaging results will be communicated to you by MyChart, letter or phone within 4 business days after the clinic has received the results. If you do not hear from us within 7 days, please contact the clinic through Tablet[employer]art or phone. If you have a critical or abnormal lab result, we will notify you by phone as soon as possible.  Submit refill requests through [employer] or call your pharmacy and they will forward the refill request to us. Please allow 3 business days for your refill to be completed.          Additional Information About Your Visit        TabletKioskharDFMSim Information     [facility] gives you secure access to your electronic health record. If you see a primary care provider, you can also send messages to your care team and make appointments. If you have questions, please call your primary care clinic.  If you do not have a primary care provider, please call [phone] and they will assist you.        Care EveryWhere ID     This is your Care EveryWhere ID. This could be used by other organizations to access your [place] medical records  [id number]        Your Vitals Were     Pulse Temperature Height Pulse Oximetry BMI (Body Mass Index)       103 97.8  F (36.6  C) (Oral) 1.676 m (5' 6\") 94% 66.19 kg/m2        Blood Pressure from Last 3 Encounters:   10/03/18 (!) 174/108   06/27/18 151/75   06/04/18 148/77    Weight from Last 3 Encounters:   10/03/18 (!) 186 kg (410 lb 1.6 oz)   06/27/18 (!) 191.9 kg (423 lb 1.6 oz)   06/04/18 (!) 189.7 kg (418 lb 4.8 oz)              Today, you had the following     No orders found for display         Where to get your medicines      These medications were sent to [id number] - [place], [place] - [address] AT [address]  [address] [place] [address]     Phone:  [phone]     folic acid 1 " "MG tablet    insulin syringe-needle U-100 30G X 1/2\" 1 ML    leucovorin 5 MG tablet    methotrexate 50 MG/2ML injection CHEMO          Primary Care Provider Office Phone # Fax #    Anjel Busby [phone]       [address] [address]        Equal Access to Services     [facility] Hospital[facility]UN[facility] : Hadii aad ku hadasho Soomaali, waaxda luqadaha, qaybta kaalmada adeegyada, waxay idiin hayaan adeeg som laAngiaan . So [facility] [phone].    ATENCIÓN: Si habla español, tiene a samano disposición servicios gratuitos de asistencia lingüística. Eriname al [phone].    We comply with applicable federal civil rights laws and [place] laws. We do not discriminate on the basis of race, color, national origin, age, disability, sex, sexual orientation, or gender identity.            Thank you!     Thank you for choosing [facility]  for your care. Our goal is always to provide you with excellent care. Hearing back from our patients is one way we can continue to improve our services. Please take a few minutes to complete the written survey that you may receive in the mail after your visit with us. Thank you!             Your Updated Medication List - Protect others around you: Learn how to safely use, store and throw away your medicines at www.disposemymeds.org.          This list is accurate as of 10/3/18 12:28 PM.  Always use your most recent med list.                   Brand Name Dispense Instructions for use Diagnosis    albuterol 108 (90 Base) MCG/ACT inhaler    PROAIR HFA/PROVENTIL HFA/VENTOLIN HFA    1 Inhaler    Inhale 2 puffs into the lungs every 6 hours as needed for shortness of breath / dyspnea or wheezing    SANTOS (dyspnea on exertion)       DULoxetine 60 MG EC capsule    CYMBALTA     TK ONE C PO  QAM        fluocinonide 0.05 % ointment    LIDEX    60 g    Apply topically daily To hands and feet when dermatitis active    Dermatitis       folic acid 1 MG tablet    FOLVITE    360 " "tablet    Take 4 tablets (4 mg) by mouth daily Take 3-4 tablets daily    High risk medication use, Rheumatoid arthritis involving multiple sites with positive rheumatoid factor (H)       insulin syringe-needle U-100 30G X 1/2\" 1 ML    BD insulin syringe ultrafine    8 each    For methotrexate use weekly    Rheumatoid arthritis involving multiple sites with positive rheumatoid factor (H)       leucovorin 5 MG tablet    WELLCOVORIN    4 tablet    Take 1 tablet (5 mg) by mouth daily Once weekly, take 24h after taking weekly methotrexate dose    Adverse effect of methotrexate, initial encounter       lisinopril 10 MG tablet    PRINIVIL/ZESTRIL    90 tablet    Take 1 tablet (10 mg) by mouth daily    Essential hypertension       methotrexate 50 MG/2ML injection CHEMO     4 mL    Inject 0.8 mL (20 mg) weekly    Rheumatoid arthritis involving multiple sites with positive rheumatoid factor (H)       methylPREDNISolone 4 MG tablet    MEDROL DOSEPAK    21 tablet    Follow package instructions    SANTOS (dyspnea on exertion)         "

## 2018-10-03 NOTE — NURSING NOTE
"Injectable Influenza Immunization Documentation    1.  Has the patient received the information for the injectable influenza vaccine? YES     2. Is the patient 6 months of age or older? YES     3. Does the patient have any of the following contraindications?         Severe allergy to eggs? No     Severe allergic reaction to previous influenza vaccines? No   Severe allergy to latex? No       History of [place]ain[place] syndrome? No     Currently have a temperature greater than 100.4F? No        4.  Severely egg allergic patients should have flu vaccine eligibility assessed by an MD, RN, or pharmacist, and those who received flu vaccine should be observed for 15 min by an MD, RN, Pharmacist, Medical Technician, or member of clinic staff.\": YES    5. Latex-allergic patients should be given latex-free influenza vaccine Yes. Please reference the Vaccine latex table to determine if your clinic s product is latex-containing.       Vaccination given by SMA Arnulfo      "

## 2018-10-03 NOTE — PATIENT INSTRUCTIONS
Dx  Rheumatoid arthritis: stable; continue methotrexate 0.8 ml each week; leukovorin, folate.  Labs every 3 months.

## 2018-10-03 NOTE — LETTER
10/3/2018      RE: Shira Rodriguez  [address] [address] [address]       [facility]  Rheumatology Clinic  Everett Austin MD  10/03/2018     Name: Shira Rodriguez  MRN: [id number]  Age: 58 year old  : 1960  Referring provider: Anjel Busby     Assessment and Plan:  # Seropositive RA (+RF/+CCP, dx 2017 with diffuse polyarticular inflammatory flare)  Patient relates scattered, short-lived, spontaneously-resolving oligoarticular flares and intermittent morning stiffness. Exam reveals no synovitis or altered range of joint motion. Labs from 2018 showed normal LFTs, CBC, and creatinine. Echocardiogram on  showed normal LVEF and valvular function but moderate concentric wall thickening consistent with left ventricular hypertrophy.     Seropositive rheumatoid arthritis disease activity is under good control with methotrexate monotherapy. We discussed the increased risk of difficult to control and damaging disease associated with positive RF and anti-CCP. I urged patient to not ignore increasing or progressive joint swelling and stiffness symptoms, since such symptom increase could reflect a need for augmented or combination therapy. For the moment I recommend continuing methotrexate 0.8 mL or 20 mg/week. While on methotrexate:  --q 3 mo AST/ALT, Albumin, CBC with plts  --Limit EtOH intake to 2 drinks weekly; use folate 1 mg daily.  --Tylenol 500 mg tid prn nausea/HA associated with dosing.  --continue use of leucovorin 5 mg once weekly and folate 4 mg daily.     # Hypertension:   Patient relates holding antihypertensive medication this morning. She reports no concerning hypertensive urgency symptoms despite elevated diastolic pressure today. Echocardiogram on  showed normal LVEF and valvular function but moderate concentric wall thickening consistent with left ventricular hypertrophy. We discussed how rheumatoid arthritis is associated with increased risk of  cardiovascular morbidity and how hypertension as a risk factor for that morbidity needs to be tightly controlled. Patient described plans to follow up on recommended cardiology consultation that Dr. Chandra had set in motion.    The patient was offered the influenza vaccine and received this today.     Follow-up: Return in about 4 months (around 2/3/2019).     HPI:   Shira Rodriguez is a 58 year old female with a history of rheumatoid arthritis who presents for follow up. The patient was last seen by Dr. Chandra on 06/04/2018, at which time she was continued on max dose subcutaneous methotrexate. She had previously been prescribed Humira but never started this. Disease remission was noted based on exam and history.      Rheumatological history:  Patient is a 58 year old female who was originally referred to rheumatology in 2/2017 by her PCP Dr. Busby for 3 months of BL hand pain thought consistent with CTS and positive RF/CCP. EMG showed moderate median neuropathy on L and severe on R, MRI c-spine normal. She received R CT injection by Fatsoma with 9 months of relief. She did describe intermittent episodes of swelling of her feet/ankles around this time period as well that resolved with medrol dose pack. She has a hx of L TKA and R TAWNY. She has chronic numbness of anterior BL shins following L TKA (was told they may have injected the nerve with marcaine around surgery years ago and numbness in R shin started in 2015 following a fall where developed large hematoma). When she was evaluated in 2/2017 she had no inflammatory arthritis symptoms and did not meet diagnosis of RA. We discussed starting plaquenil given it's role in potentially preventing pts with +RF/CCP from developing clinical RA, although she did not want to do this at the time. She was re-evaluated 8/2017 after developing polyarticular inflammatory arthritis of the right knee, ankles, wrists, hands, and shoulders. She was started on MTX, given  depomedrol IM injection, oral prednisone, and given R knee CS injection. MTX increased and oral prednisone tapered to off in 9/2017. Had repeat R CT injection in 10/2017 that lasted until 1/2018. R knee significantly improved following CS injection 12/2017. Has persistent pain in L heel with enthesopathy on imaging, unclear if had true enthesitis in this region due to her RA. Participated in pool therapy winter 2018 with good response. At her 3/2018 OV she continued to have significant pain in her R wrist thought due to CT, but given some ongoing persistent EMS (BL hand stiffness lasting ALL day) as well and her known seropositive disease I recommended adding Humira to her regimen. This was ordered but she never started.     Today, the patient is doing well overall but does have occasional flares of joint pain. She had a flare of left 5th PIP pain without visible swelling or altered range of motion a little over a month ago. The pain lasted a few days. She has not had similar additional episodes since then. She does typically have left wrist pain with applying pressure to the palm when pushing herself up. She also notes up to an hour of early morning stiffness but this is not significantly bothersome. 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     When she initially went on the methotrexate, she had tongue pain but never noticed a visible sore. After starting folic acid, which she takes 4 mg of daily, this improved. The pain returned and then eventually improved again after starting to take Leucovorin 24 hours after dosing the methotrexate. Last week for about 2 days, she had a return of tongue pain, which has since resolved. She had not missed any medications prior to this. She additionally reports that she has not required any prednisone since prior evaluation.     She denies any skin rashes, chest pain, abdominal pain, or change in bowel habits. She does have back pain and plans to follow up with orthopedics next week.  "She did have an injection in May but did not have sustained relief with this. She does also note bilateral achilles pain. Podiatry attributed this to her back pain in the past. She does have an appointment with podiatry next week as well. She denies any pain to the forefeet. She feels like her inflammatory arthritis is under reasonable control, although she reports that she does experience increased aches with weather changes, although these are not significant enough to interfere with her daily activities. This achy sensation is present not even once a month.     Of note, she did not take her blood pressure medication this morning. She plans to follow up with cardiology after her recent echocardiogram.     Review of Systems:   Pertinent items are noted in HPI or as below, remainder of complete ROS is negative.      No recent problems with hearing or vision. No swallowing problems.   No breathing difficulty, shortness of breath, coughing, or wheezing  No chest pain or palpitations  No heart burn, indigestion, abdominal pain, nausea, vomiting, diarrhea  No urination problems, no bloody, cloudy urine, no dysuria  No numbing, tingling, weakness  No headaches or confusion  No rashes. No easy bleeding or bruising.   + joint aches   + tongue pain   + back pain   + achilles pain     Active Medications:      albuterol (PROAIR HFA/PROVENTIL HFA/VENTOLIN HFA) 108 (90 BASE) MCG/ACT Inhaler, Inhale 2 puffs into the lungs every 6 hours as needed for shortness of breath / dyspnea or wheezing, Disp: 1 Inhaler, Rfl: 0     DULoxetine (CYMBALTA) 60 MG EC capsule, TK ONE C PO  QAM, Disp: , Rfl: 0     fluocinonide (LIDEX) 0.05 % ointment, Apply topically daily To hands and feet when dermatitis active, Disp: 60 g, Rfl: 2     folic acid (FOLVITE) 1 MG tablet, Take 4 tablets (4 mg) by mouth daily Take 3-4 tablets daily, Disp: 360 tablet, Rfl: 3     insulin syringe-needle U-100 (BD INSULIN SYRINGE ULTRAFINE) 30G X 1/2\" 1 ML, For " methotrexate use weekly, Disp: 8 each, Rfl: prn     leucovorin (WELLCOVORIN) 5 MG tablet, Take 1 tablet (5 mg) by mouth daily Once weekly, take 24h after taking weekly methotrexate dose, Disp: 4 tablet, Rfl: 2     lisinopril (PRINIVIL/ZESTRIL) 10 MG tablet, Take 1 tablet (10 mg) by mouth daily, Disp: 90 tablet, Rfl: 3     methotrexate 50 MG/2ML injection CHEMO, Inject 0.8 mL (20 mg) weekly, Disp: 4 mL, Rfl: 2     methylPREDNISolone (MEDROL DOSEPAK) 4 MG tablet, Follow package instructions, Disp: 21 tablet, Rfl: 0     [DISCONTINUED] leucovorin (WELLCOVORIN) 5 MG tablet, Take 1 tablet (5 mg) by mouth daily Once weekly, take 24h after taking weekly methotrexate dose, Disp: 4 tablet, Rfl: 2     [DISCONTINUED] methotrexate 50 MG/2ML injection CHEMO, Inject 0.8 mL (20 mg) weekly, Disp: 4 mL, Rfl: 2    Current Facility-Administered Medications:      lidocaine (PF) (XYLOCAINE) 1 % injection 50 mg, 5 mL, Other, Once, Rosita Chandra MD     methylPREDNISolone acetate (DEPO-MEDROL) injection 40 mg, 40 mg, INTRA-ARTICULAR, Once, Rosita Chandra MD      Allergies:   Adhesive tape and Erythromycin      Past Medical History:  Depression   Eczema   Hyperlipidemia   Hypertension   Morbid obesity   Osteoarthrosis right knee   Sleep apnea   Tobacco use disorder   Primary localized osteoarthrosis, pelvic region and thigh  Degeneration of cervical intervertebral disc     Past Surgical History:  Left total knee arthoplasty 6/14/2012   Right hip arthroplasty 07/09/2004     Family History:   Mother: thyroid disease, hypertension, skin cancer, CVA  Paternal grandmother: breast cancer, pancreatic cancer   Father: alcoholism   Sister: thyroid disease, alcoholism   Maternal grandmother: hypertension   Sister: heart disease, multiple ablations   Paternal grandfather: prostate cancer      Social History:   Patient is a current everyday smoker of 1 ppd and endorses occasional alcohol consumption.     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     Physical Exam:   BP (!) 174/108  Pulse 103  Temp 97.8  F (36.6  C) (Oral)  Ht 1.676 m (5' 6\")  Wt (!) 186 kg (410 lb 1.6 oz)  SpO2 94%  BMI 66.19 kg/m2   Wt Readings from Last 4 Encounters:   10/03/18 (!) 186 kg (410 lb 1.6 oz)   18 (!) 191.9 kg (423 lb 1.6 oz)   18 (!) 189.7 kg (418 lb 4.8 oz)   18 (!) 185.1 kg (408 lb)     Constitutional: Well-developed, appearing stated age; cooperative  Eyes: Normal EOM, PERRLA, vision, conjunctiva, sclera  ENT: Normal external ears, nose, hearing, lips, teeth, gums, throat. No mucous membrane lesions, normal saliva pool  Neck: No mass or thyroid enlargement  Resp: Lungs clear to auscultation, nl to palpation  CV: RRR, no murmurs, rubs or gallops, no edema  GI: No ABD mass or tenderness, no HSM  : Not tested  Lymph: No cervical, supraclavicular, inguinal or epitrochlear nodes  MS: No swelling in the PIPs or MCPS. Fist formation and  strength intact. Wrist range of motion is normal. Painful flexion at the right shoulder. No effusion or tenderness over the coracoid. MTP compression tests negative. Good alignment of the forefeet. Cool ankles and knees without effusion. Ankle range of motion is normal.   Skin: No nail pitting, alopecia, rash, nodules or lesions. Nails and periungual skin is normal.   Neuro: Normal cranial nerves, strength, sensation, DTRs.   Psych: Normal judgement, orientation, memory, affect.     Data:   Chest x-ray 2 views 2018:   Stable enlargement cardiac silhouette and mild pulmonary vascular congestion.  Per radiology.     Results for orders placed or performed in visit on 18   Echocardiogram Complete    Narrative    [id number]  [id number]  [id number]  [id number]^C[place]^VINOD^[facility]  Diagnostic and [facility]-3rd Floor  [address] [place] [id number]     Name: ROWELLXAVIER HERR  MRN: [id number]  : " 1960  Study Date: 06/07/2018 05:59 PM  Age: 58 yrs  Gender: Female  [place] [facility] [facility]  Reason For Study: Cough, Edema extremities  Ordering Physician: VINOD SHULTZ  Referring Physician: VINOD SHULTZ  Performed By: Ryley Ridley RDCS     BSA: 2.7 m2  Height: 66 in  Weight: 418 lb  HR: 85  BP: 148/77 mmHg  _____________________________________________________________________________  __        Procedure  Echocardiogram with two-dimensional, color and spectral Doppler performed.  _____________________________________________________________________________  __        Interpretation Summary  Moderate concentric wall thickening consistent with left ventricular  hypertrophy is present.  Biventricular size and systolic function is normal.The LVEF is 55-60%.  No significant valve disease.  Inferior vena cava appers dilated measuring 2.4 cm  _____________________________________________________________________________  __        Left Ventricle  Left ventricular size is normal. Left ventricular function is normal.The EF is  55-60%. Moderate concentric wall thickening consistent with left ventricular  hypertrophy is present. Left ventricular diastolic function is indeterminate.  Regional wall motion is probably normal. Endocardial border definition  suboptimal and contrast not given.     Right Ventricle  The right ventricle is normal size. Global right ventricular function is  normal.     Atria  Both atria appear normal.     Mitral Valve  Mild mitral annular calcification is present. Trace mitral insufficiency is  present.        Aortic Valve  Mild aortic valve sclerosis is present. No aortic regurgitation is present.  Transvalvular Doppler is normal and without signs of signifcant stenosis.     Tricuspid Valve  The peak velocity of the tricuspid regurgitant jet is not obtainable.  Pulmonary artery systolic pressure cannot be assessed.     Pulmonic Valve  The pulmonic valve cannot be assessed.      Vessels  The aorta root is normal. Inferior vena cava appers dilated measuring 2.4 cm.  Ascending aorta 3.3 cm.     Pericardium  No pericardial effusion is present.     _____________________________________________________________________________  __  MMode/2D Measurements & Calculations  IVSd: 1.5 cm  LVIDd: 5.4 cm  LVIDs: 3.7 cm  LVPWd: 1.3 cm  FS: 30.5 %  LV mass(C)d: 323.5 grams  LV mass(C)dI: 118.2 grams/m2     asc Aorta Diam: 3.3 cm  LVOT diam: 2.2 cm  LVOT area: 4.0 cm2  LA Volume (BP): 71.6 ml  LA Volume Index (BP): 26.1 ml/m2  RWT: 0.47        Doppler Measurements & Calculations  MV E max emile: 67.4 cm/sec  MV A max emile: 88.8 cm/sec  MV E/A: 0.76  MV dec time: 0.17 sec  Ao V2 max: 203.2 cm/sec  Ao max P.0 mmHg  MERON(V,D): 2.3 cm2     LV V1 max P.5 mmHg  LV V1 max: 116.8 cm/sec  AV Emile Ratio (DI): 0.57  E/E' av.0  Lateral E/e': 13.4  Medial E/e': 12.6     _____________________________________________________________________________  __           Report approved by: Tariq Pappas 2018 10:50 AM          Recent Labs   Lab Test  18   1200  18   0733  17   0954   17   1644  17   1029   17   0920  17   1643   16   1215  16   1304   12   1027   WBC  7.5  7.4  7.5   < >  8.7   --    < >   --   6.4   < >  7.0  5.2   < >   --    HGB  15.8*  16.1*  16.0*   < >  15.8*   --    < >   --   15.5   < >  16.7*  17.3*   < >  15.5   HCT  47.5*  48.8*  49.1*   < >  49.1*   --    < >   --   47.1*   < >  50.9*  50.5*   < >   --    MCV  96  96  97   < >  90   --    < >   --   92   < >  90  88   < >   --    PLT  214  235  242   < >  260   --    < >   --   320   < >  216  234   < >   --    BUN   --    --    --    --   14   --    --   10  15   < >  9  8   --   18   TSH   --    --    --    --    --    --    --    --    --    --   1.66  1.49   --   1.23   AST  22  18  17   < >  11  10   --   28  48*   < >  20  19   --   21   ALT  40  28  34   < >   28  21   --   46  97*   < >  50  51*   --   20   ALKPHOS   --    --    --    --   67  62   --   123  251*   < >  64  61   --   73    < > = values in this interval not displayed.     RHEUM RESULTS Latest Ref Rng & Units 12/4/2017 3/5/2018 6/4/2018   SED RATE 0 - 30 mm/h 12 11 -   CRP, INFLAMMATION 0.0 - 8.0 mg/L 9.5(H) 7.1 -   RHEUMATOID FACTOR <20 IU/mL - - -   EMERY SCREEN BY EIA <1.0 - - -   AST 0 - 45 U/L 17 18 22   ALT 0 - 50 U/L 34 28 40   ALBUMIN 3.4 - 5.0 g/dL - 3.3(L) 3.3(L)   WBC 4.0 - 11.0 10e9/L 7.5 7.4 7.5   RBC 3.8 - 5.2 10e12/L 5.08 5.06 4.97   HGB 11.7 - 15.7 g/dL 16.0(H) 16.1(H) 15.8(H)   HCT 35.0 - 47.0 % 49.1(H) 48.8(H) 47.5(H)   MCV 78 - 100 fl 97 96 96   MCHC 31.5 - 36.5 g/dL 32.6 33.0 33.3   RDW 10.0 - 15.0 % 17.2(H) 13.9 13.7    - 450 10e9/L 242 235 214   CREATININE 0.52 - 1.04 mg/dL 0.58 0.52 0.56   GFR ESTIMATE, IF BLACK >60 mL/min/1.7m2 >90 >90 >90   GFR ESTIMATE >60 mL/min/1.7m2 >90 >90 >90    - 1620 mg/dL - - -   IGA 70 - 380 mg/dL - - -   IGM 60 - 265 mg/dL - - -   HEPATITIS C ANTIBODY NR - - -       Rheumatoid Factor   Date Value Ref Range Status   08/28/2017 51 (H) <20 IU/mL Final   ,   Cyclic Citrullinated Peptide Antibody, IgG   Date Value Ref Range Status   08/28/2017 15 (H) <7 U/mL Final     Comment:     Positive     Reviewed Rheumatology lab flowsheet      Scribe Disclosure:   Abimbola MAHARAJ, am serving as a scribe to document services personally performed by Everett Austin MD at this visit, based upon the provider's statements to me. All documentation has been reviewed by the aforementioned provider prior to being entered into the official medical record.     Portions of this medical record were completed by a scribe. UPON MY REVIEW AND AUTHENTICATION BY ELECTRONIC SIGNATURE, this confirms (a) I performed the applicable clinical services, and (b) the record is accurate.  Everett Austin MD  Staff Rheumatologist, [facility]

## 2018-10-03 NOTE — PROGRESS NOTES
[facility]  Rheumatology Clinic  Everett Austin MD  10/03/2018     Name: Shira Rodriguez  MRN: [id number]  Age: 58 year old  : 1960  Referring provider: Anjel Busby     Assessment and Plan:  # Seropositive RA (+RF/+CCP, dx 2017 with diffuse polyarticular inflammatory flare)  Patient relates scattered, short-lived, spontaneously-resolving oligoarticular flares and intermittent morning stiffness. Exam reveals no synovitis or altered range of joint motion. Labs from 2018 showed normal LFTs, CBC, and creatinine. Echocardiogram on  showed normal LVEF and valvular function but moderate concentric wall thickening consistent with left ventricular hypertrophy.     Seropositive rheumatoid arthritis disease activity is under good control with methotrexate monotherapy. We discussed the increased risk of difficult to control and damaging disease associated with positive RF and anti-CCP. I urged patient to not ignore increasing or progressive joint swelling and stiffness symptoms, since such symptom increase could reflect a need for augmented or combination therapy. For the moment I recommend continuing methotrexate 0.8 mL or 20 mg/week. While on methotrexate:  --q 3 mo AST/ALT, Albumin, CBC with plts  --Limit EtOH intake to 2 drinks weekly; use folate 1 mg daily.  --Tylenol 500 mg tid prn nausea/HA associated with dosing.  --continue use of leucovorin 5 mg once weekly and folate 4 mg daily.     # Hypertension:   Patient relates holding antihypertensive medication this morning. She reports no concerning hypertensive urgency symptoms despite elevated diastolic pressure today. Echocardiogram on  showed normal LVEF and valvular function but moderate concentric wall thickening consistent with left ventricular hypertrophy. We discussed how rheumatoid arthritis is associated with increased risk of cardiovascular morbidity and how hypertension as a risk factor for that morbidity needs to be tightly  controlled. Patient described plans to follow up on recommended cardiology consultation that Dr. Chandra had set in motion.    The patient was offered the influenza vaccine and received this today.     Follow-up: Return in about 4 months (around 2/3/2019).     HPI:   Shira Rodriguez is a 58 year old female with a history of rheumatoid arthritis who presents for follow up. The patient was last seen by Dr. Chandra on 06/04/2018, at which time she was continued on max dose subcutaneous methotrexate. She had previously been prescribed Humira but never started this. Disease remission was noted based on exam and history.      Rheumatological history:  Patient is a 58 year old female who was originally referred to rheumatology in 2/2017 by her PCP Dr. Busby for 3 months of BL hand pain thought consistent with CTS and positive RF/CCP. EMG showed moderate median neuropathy on L and severe on R, MRI c-spine normal. She received R CT injection by sports [place] with 9 months of relief. She did describe intermittent episodes of swelling of her feet/ankles around this time period as well that resolved with medrol dose pack. She has a hx of L TKA and R TAWNY. She has chronic numbness of anterior BL shins following L TKA (was told they may have injected the nerve with marcaine around surgery years ago and numbness in R shin started in 2015 following a fall where developed large hematoma). When she was evaluated in 2/2017 she had no inflammatory arthritis symptoms and did not meet diagnosis of RA. We discussed starting plaquenil given it's role in potentially preventing pts with +RF/CCP from developing clinical RA, although she did not want to do this at the time. She was re-evaluated 8/2017 after developing polyarticular inflammatory arthritis of the right knee, ankles, wrists, hands, and shoulders. She was started on MTX, given depomedrol IM injection, oral prednisone, and given R knee CS injection. MTX increased and oral prednisone  tapered to off in 9/2017. Had repeat R CT injection in 10/2017 that lasted until 1/2018. R knee significantly improved following CS injection 12/2017. Has persistent pain in L heel with enthesopathy on imaging, unclear if had true enthesitis in this region due to her RA. Participated in pool therapy winter 2018 with good response. At her 3/2018 OV she continued to have significant pain in her R wrist thought due to CT, but given some ongoing persistent EMS (BL hand stiffness lasting ALL day) as well and her known seropositive disease I recommended adding Humira to her regimen. This was ordered but she never started.     Today, the patient is doing well overall but does have occasional flares of joint pain. She had a flare of left 5th PIP pain without visible swelling or altered range of motion a little over a month ago. The pain lasted a few days. She has not had similar additional episodes since then. She does typically have left wrist pain with applying pressure to the palm when pushing herself up. She also notes up to an hour of early morning stiffness but this is not significantly bothersome. 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     When she initially went on the methotrexate, she had tongue pain but never noticed a visible sore. After starting folic acid, which she takes 4 mg of daily, this improved. The pain returned and then eventually improved again after starting to take Leucovorin 24 hours after dosing the methotrexate. Last week for about 2 days, she had a return of tongue pain, which has since resolved. She had not missed any medications prior to this. She additionally reports that she has not required any prednisone since prior evaluation.     She denies any skin rashes, chest pain, abdominal pain, or change in bowel habits. She does have back pain and plans to follow up with orthopedics next week. She did have an injection in May but did not have sustained relief with this. She does also note bilateral  "achilles pain. Podiatry attributed this to her back pain in the past. She does have an appointment with podiatry next week as well. She denies any pain to the forefeet. She feels like her inflammatory arthritis is under reasonable control, although she reports that she does experience increased aches with weather changes, although these are not significant enough to interfere with her daily activities. This achy sensation is present not even once a month.     Of note, she did not take her blood pressure medication this morning. She plans to follow up with cardiology after her recent echocardiogram.     Review of Systems:   Pertinent items are noted in HPI or as below, remainder of complete ROS is negative.      No recent problems with hearing or vision. No swallowing problems.   No breathing difficulty, shortness of breath, coughing, or wheezing  No chest pain or palpitations  No heart burn, indigestion, abdominal pain, nausea, vomiting, diarrhea  No urination problems, no bloody, cloudy urine, no dysuria  No numbing, tingling, weakness  No headaches or confusion  No rashes. No easy bleeding or bruising.   + joint aches   + tongue pain   + back pain   + achilles pain     Active Medications:      albuterol (PROAIR HFA/PROVENTIL HFA/VENTOLIN HFA) 108 (90 BASE) MCG/ACT Inhaler, Inhale 2 puffs into the lungs every 6 hours as needed for shortness of breath / dyspnea or wheezing, Disp: 1 Inhaler, Rfl: 0     DULoxetine (CYMBALTA) 60 MG EC capsule, TK ONE C PO  QAM, Disp: , Rfl: 0     fluocinonide (LIDEX) 0.05 % ointment, Apply topically daily To hands and feet when dermatitis active, Disp: 60 g, Rfl: 2     folic acid (FOLVITE) 1 MG tablet, Take 4 tablets (4 mg) by mouth daily Take 3-4 tablets daily, Disp: 360 tablet, Rfl: 3     insulin syringe-needle U-100 (BD INSULIN SYRINGE ULTRAFINE) 30G X 1/2\" 1 ML, For methotrexate use weekly, Disp: 8 each, Rfl: prn     leucovorin (WELLCOVORIN) 5 MG tablet, Take 1 tablet (5 mg) by " mouth daily Once weekly, take 24h after taking weekly methotrexate dose, Disp: 4 tablet, Rfl: 2     lisinopril (PRINIVIL/ZESTRIL) 10 MG tablet, Take 1 tablet (10 mg) by mouth daily, Disp: 90 tablet, Rfl: 3     methotrexate 50 MG/2ML injection CHEMO, Inject 0.8 mL (20 mg) weekly, Disp: 4 mL, Rfl: 2     methylPREDNISolone (MEDROL DOSEPAK) 4 MG tablet, Follow package instructions, Disp: 21 tablet, Rfl: 0     [DISCONTINUED] leucovorin (WELLCOVORIN) 5 MG tablet, Take 1 tablet (5 mg) by mouth daily Once weekly, take 24h after taking weekly methotrexate dose, Disp: 4 tablet, Rfl: 2     [DISCONTINUED] methotrexate 50 MG/2ML injection CHEMO, Inject 0.8 mL (20 mg) weekly, Disp: 4 mL, Rfl: 2    Current Facility-Administered Medications:      lidocaine (PF) (XYLOCAINE) 1 % injection 50 mg, 5 mL, Other, Once, Rosita Chandra MD     methylPREDNISolone acetate (DEPO-MEDROL) injection 40 mg, 40 mg, INTRA-ARTICULAR, Once, Rosita Chandra MD      Allergies:   Adhesive tape and Erythromycin      Past Medical History:  Depression   Eczema   Hyperlipidemia   Hypertension   Morbid obesity   Osteoarthrosis right knee   Sleep apnea   Tobacco use disorder   Primary localized osteoarthrosis, pelvic region and thigh  Degeneration of cervical intervertebral disc     Past Surgical History:  Left total knee arthoplasty 6/14/2012   Right hip arthroplasty 07/09/2004     Family History:   Mother: thyroid disease, hypertension, skin cancer, CVA  Paternal grandmother: breast cancer, pancreatic cancer   Father: alcoholism   Sister: thyroid disease, alcoholism   Maternal grandmother: hypertension   Sister: heart disease, multiple ablations   Paternal grandfather: prostate cancer      Social History:   Patient is a current everyday smoker of 1 ppd and endorses occasional alcohol consumption.     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     Physical Exam:   BP (!) 174/108  Pulse 103  Temp 97.8  F (36.6  C)  "(Oral)  Ht 1.676 m (5' 6\")  Wt (!) 186 kg (410 lb 1.6 oz)  SpO2 94%  BMI 66.19 kg/m2   Wt Readings from Last 4 Encounters:   10/03/18 (!) 186 kg (410 lb 1.6 oz)   18 (!) 191.9 kg (423 lb 1.6 oz)   18 (!) 189.7 kg (418 lb 4.8 oz)   18 (!) 185.1 kg (408 lb)     Constitutional: Well-developed, appearing stated age; cooperative  Eyes: Normal EOM, PERRLA, vision, conjunctiva, sclera  ENT: Normal external ears, nose, hearing, lips, teeth, gums, throat. No mucous membrane lesions, normal saliva pool  Neck: No mass or thyroid enlargement  Resp: Lungs clear to auscultation, nl to palpation  CV: RRR, no murmurs, rubs or gallops, no edema  GI: No ABD mass or tenderness, no HSM  : Not tested  Lymph: No cervical, supraclavicular, inguinal or epitrochlear nodes  MS: No swelling in the PIPs or MCPS. Fist formation and  strength intact. Wrist range of motion is normal. Painful flexion at the right shoulder. No effusion or tenderness over the coracoid. MTP compression tests negative. Good alignment of the forefeet. Cool ankles and knees without effusion. Ankle range of motion is normal.   Skin: No nail pitting, alopecia, rash, nodules or lesions. Nails and periungual skin is normal.   Neuro: Normal cranial nerves, strength, sensation, DTRs.   Psych: Normal judgement, orientation, memory, affect.     Data:   Chest x-ray 2 views 2018:   Stable enlargement cardiac silhouette and mild pulmonary vascular congestion.  Per radiology.     Results for orders placed or performed in visit on 18   Echocardiogram Complete    Narrative    [id number]  [id number]  [id number]  [id number]^CARRINGTON^VINOD^WILLIS           [facility]  [facility]  Diagnostic and Treamtent-3rd Floor  [address] [place] [address]     Name: XAVIER ROWELL  MRN: [id number]  : 1960  Study Date: 2018 05:59 PM  Age: 58 yrs  Gender: Female  Patient [place] [facility] [place]  Reason For Study: " Cough, Edema extremities  Ordering Physician: VINOD SHULTZ  Referring Physician: VINOD SHULTZ WILLIS  Performed By: Ryley Ridley RDCS     BSA: 2.7 m2  Height: 66 in  Weight: 418 lb  HR: 85  BP: 148/77 mmHg  _____________________________________________________________________________  __        Procedure  Echocardiogram with two-dimensional, color and spectral Doppler performed.  _____________________________________________________________________________  __        Interpretation Summary  Moderate concentric wall thickening consistent with left ventricular  hypertrophy is present.  Biventricular size and systolic function is normal.The LVEF is 55-60%.  No significant valve disease.  Inferior vena cava appers dilated measuring 2.4 cm  _____________________________________________________________________________  __        Left Ventricle  Left ventricular size is normal. Left ventricular function is normal.The EF is  55-60%. Moderate concentric wall thickening consistent with left ventricular  hypertrophy is present. Left ventricular diastolic function is indeterminate.  Regional wall motion is probably normal. Endocardial border definition  suboptimal and contrast not given.     Right Ventricle  The right ventricle is normal size. Global right ventricular function is  normal.     Atria  Both atria appear normal.     Mitral Valve  Mild mitral annular calcification is present. Trace mitral insufficiency is  present.        Aortic Valve  Mild aortic valve sclerosis is present. No aortic regurgitation is present.  Transvalvular Doppler is normal and without signs of signifcant stenosis.     Tricuspid Valve  The peak velocity of the tricuspid regurgitant jet is not obtainable.  Pulmonary artery systolic pressure cannot be assessed.     Pulmonic Valve  The pulmonic valve cannot be assessed.     Vessels  The aorta root is normal. Inferior vena cava appers dilated measuring 2.4 cm.  Ascending aorta 3.3 cm.      Pericardium  No pericardial effusion is present.     _____________________________________________________________________________  __  MMode/2D Measurements & Calculations  IVSd: 1.5 cm  LVIDd: 5.4 cm  LVIDs: 3.7 cm  LVPWd: 1.3 cm  FS: 30.5 %  LV mass(C)d: 323.5 grams  LV mass(C)dI: 118.2 grams/m2     asc Aorta Diam: 3.3 cm  LVOT diam: 2.2 cm  LVOT area: 4.0 cm2  LA Volume (BP): 71.6 ml  LA Volume Index (BP): 26.1 ml/m2  RWT: 0.47        Doppler Measurements & Calculations  MV E max emile: 67.4 cm/sec  MV A max emile: 88.8 cm/sec  MV E/A: 0.76  MV dec time: 0.17 sec  Ao V2 max: 203.2 cm/sec  Ao max P.0 mmHg  MERON(V,D): 2.3 cm2     LV V1 max P.5 mmHg  LV V1 max: 116.8 cm/sec  AV Emile Ratio (DI): 0.57  E/E' av.0  Lateral E/e': 13.4  Medial E/e': 12.6     _____________________________________________________________________________  __           Report approved by: Tariq Pappas 2018 10:50 AM          Recent Labs   Lab Test  18   1200  18   0733  17   0954   17   1644  17   1029   17   0920  17   1643   16   1215  16   1304   12   1027   WBC  7.5  7.4  7.5   < >  8.7   --    < >   --   6.4   < >  7.0  5.2   < >   --    HGB  15.8*  16.1*  16.0*   < >  15.8*   --    < >   --   15.5   < >  16.7*  17.3*   < >  15.5   HCT  47.5*  48.8*  49.1*   < >  49.1*   --    < >   --   47.1*   < >  50.9*  50.5*   < >   --    MCV  96  96  97   < >  90   --    < >   --   92   < >  90  88   < >   --    PLT  214  235  242   < >  260   --    < >   --   320   < >  216  234   < >   --    BUN   --    --    --    --   14   --    --   10  15   < >  9  8   --   18   TSH   --    --    --    --    --    --    --    --    --    --   1.66  1.49   --   1.23   AST  22  18  17   < >  11  10   --   28  48*   < >  20  19   --   21   ALT  40  28  34   < >  28  21   --   46  97*   < >  50  51*   --   20   ALKPHOS   --    --    --    --   67  62   --   123  251*   < >  64   61   --   73    < > = values in this interval not displayed.     RHEUM RESULTS Latest Ref Rng & Units 12/4/2017 3/5/2018 6/4/2018   SED RATE 0 - 30 mm/h 12 11 -   CRP, INFLAMMATION 0.0 - 8.0 mg/L 9.5(H) 7.1 -   RHEUMATOID FACTOR <20 IU/mL - - -   EMERY SCREEN BY EIA <1.0 - - -   AST 0 - 45 U/L 17 18 22   ALT 0 - 50 U/L 34 28 40   ALBUMIN 3.4 - 5.0 g/dL - 3.3(L) 3.3(L)   WBC 4.0 - 11.0 10e9/L 7.5 7.4 7.5   RBC 3.8 - 5.2 10e12/L 5.08 5.06 4.97   HGB 11.7 - 15.7 g/dL 16.0(H) 16.1(H) 15.8(H)   HCT 35.0 - 47.0 % 49.1(H) 48.8(H) 47.5(H)   MCV 78 - 100 fl 97 96 96   MCHC 31.5 - 36.5 g/dL 32.6 33.0 33.3   RDW 10.0 - 15.0 % 17.2(H) 13.9 13.7    - 450 10e9/L 242 235 214   CREATININE 0.52 - 1.04 mg/dL 0.58 0.52 0.56   GFR ESTIMATE, IF BLACK >60 mL/min/1.7m2 >90 >90 >90   GFR ESTIMATE >60 mL/min/1.7m2 >90 >90 >90    - 1620 mg/dL - - -   IGA 70 - 380 mg/dL - - -   IGM 60 - 265 mg/dL - - -   HEPATITIS C ANTIBODY NR - - -       Rheumatoid Factor   Date Value Ref Range Status   08/28/2017 51 (H) <20 IU/mL Final   ,   Cyclic Citrullinated Peptide Antibody, IgG   Date Value Ref Range Status   08/28/2017 15 (H) <7 U/mL Final     Comment:     Positive     Reviewed Rheumatology lab flowsheet      Scribe Disclosure:   Abimbola MAHARAJ, am serving as a scribe to document services personally performed by Everett Austin MD at this visit, based upon the provider's statements to me. All documentation has been reviewed by the aforementioned provider prior to being entered into the official medical record.     Portions of this medical record were completed by a scribe. UPON MY REVIEW AND AUTHENTICATION BY ELECTRONIC SIGNATURE, this confirms (a) I performed the applicable clinical services, and (b) the record is accurate.  Everett Austin MD  Staff RheumatologistBRITTANI

## 2018-10-09 ENCOUNTER — OFFICE VISIT (OUTPATIENT)
Dept: ORTHOPEDICS | Facility: CLINIC | Age: 58
End: 2018-10-09
Payer: COMMERCIAL

## 2018-10-09 VITALS — HEART RATE: 98 BPM | SYSTOLIC BLOOD PRESSURE: 183 MMHG | DIASTOLIC BLOOD PRESSURE: 99 MMHG | OXYGEN SATURATION: 95 %

## 2018-10-09 DIAGNOSIS — M51.369 LUMBAR DEGENERATIVE DISC DISEASE: Primary | ICD-10-CM

## 2018-10-09 PROCEDURE — 99214 OFFICE O/P EST MOD 30 MIN: CPT | Performed by: PREVENTIVE MEDICINE

## 2018-10-09 ASSESSMENT — PAIN SCALES - GENERAL: PAINLEVEL: EXTREME PAIN (8)

## 2018-10-09 NOTE — PROGRESS NOTES
"HISTORY OF PRESENT ILLNESS  Ms. Rodriguez is a pleasant 58 year old year old female who presents to clinic today for followup for low back pain  She thinks it may be time for another injection it was helpful for several months last injection  She has not done PT recently and is willing to  Wants to discuss her previous MRI and plan for an injection    MEDICAL HISTORY  Patient Active Problem List   Diagnosis     Primary localized osteoarthrosis, pelvic region and thigh     Adjustment disorder with depressed mood     Tobacco use disorder     Morbid obesity (H)     Arthritis of knee, degenerative     Degeneration of cervical intervertebral disc     CYRUS (obstructive sleep apnea)     Elevated blood pressure (not hypertension)       Current Outpatient Prescriptions   Medication Sig Dispense Refill     albuterol (PROAIR HFA/PROVENTIL HFA/VENTOLIN HFA) 108 (90 BASE) MCG/ACT Inhaler Inhale 2 puffs into the lungs every 6 hours as needed for shortness of breath / dyspnea or wheezing 1 Inhaler 0     DULoxetine (CYMBALTA) 60 MG EC capsule TK ONE C PO  QAM  0     fluocinonide (LIDEX) 0.05 % ointment Apply topically daily To hands and feet when dermatitis active 60 g 2     folic acid (FOLVITE) 1 MG tablet Take 4 tablets (4 mg) by mouth daily Take 3-4 tablets daily 360 tablet 3     insulin syringe-needle U-100 (BD INSULIN SYRINGE ULTRAFINE) 30G X 1/2\" 1 ML For methotrexate use weekly 8 each prn     leucovorin (WELLCOVORIN) 5 MG tablet Take 1 tablet (5 mg) by mouth daily Once weekly, take 24h after taking weekly methotrexate dose 4 tablet 2     lisinopril (PRINIVIL/ZESTRIL) 10 MG tablet Take 1 tablet (10 mg) by mouth daily 90 tablet 3     methotrexate 50 MG/2ML injection CHEMO Inject 0.8 mL (20 mg) weekly 4 mL 2     methylPREDNISolone (MEDROL DOSEPAK) 4 MG tablet Follow package instructions 21 tablet 0       Allergies   Allergen Reactions     Adhesive Tape Blisters     Erythromycin Rash       Family History   Problem Relation Age of " Onset     Thyroid Disease Mother      Hypertension Mother      Skin Cancer Mother      MMohs surgery with skin graft     Cerebrovascular Disease Mother      CVA after endarderectomy     Diabetes Mother      Breast Cancer Paternal Grandmother      Pancreatic Cancer Paternal Grandmother      Cardiovascular Paternal Aunt      Cardiovascular Paternal Uncle      Depression Other      Alcoholism Father      Thyroid Disease Sister      Alcoholism Sister      Hypertension Maternal Grandmother      HEART DISEASE Sister      multiple ablations     Alcoholism Sister      Prostate Cancer Paternal Grandfather        Additional medical/Social/Surgical histories reviewed in [facility] and updated as appropriate.     REVIEW OF SYSTEMS (10/9/2018)  10 point ROS of systems including Constitutional, Eyes, Respiratory, Cardiovascular, Gastroenterology, Genitourinary, Integumentary, Musculoskeletal, Psychiatric were all negative except for pertinent positives noted in my HPI.     PHYSICAL EXAM  Vitals:    10/09/18 0720   BP: (!) 183/99   Pulse: 98   SpO2: 95%        PHYSICAL EXAM  Vitals:    10/09/18 0720   BP: (!) 183/99   Pulse: 98   SpO2: 95%     Vital Signs: BP (!) 183/99  Pulse 98  SpO2 95% Patient declined being weighed. There is no height or weight on file to calculate BMI.    General  - normal appearance, in no obvious distress  CV  - normal peripheral perfusion  Pulm  - normal respiratory pattern, non-labored  Musculoskeletal - lumbar spine  - stance: normal gait without limp, no obvious leg length discrepancy, normal heel and toe walk  - inspection: normal bone and joint alignment, no obvious scoliosis  - palpation: no paravertebral or bony tenderness  - ROM: flexion exacerbates pain, normal extension, sidebending, rotation  - strength: lower extremities 5/5 in all planes  - special tests:  (+) straight leg raise  (+) slump test  Neuro  - patellar and Achilles DTRs 2+ bilaterally, lower extremity sensory deficit throughout L5  distribution, grossly normal coordination, normal muscle tone  Skin  - no ecchymosis, erythema, warmth, or induration, no obvious rash  Psych  - interactive, appropriate, normal mood and affect    ASSESSMENT & PLAN  57 yo female with lumbar ddd, radicular pain  Medrol pack  Gabapentin  MEAGHAN ordered  PT ordered  F/u in 1 - 2 months    Tashi Saxena MD, CAQSM

## 2018-10-09 NOTE — PATIENT INSTRUCTIONS
Thanks for coming today.  Ortho/Sports Medicine Clinic  [address] [place] [address]    To schedule future appointments in Ortho Clinic, you may call [phone].    To schedule ordered imaging by your provider:   Call Central [facility] [phone]    To schedule an injection ordered by your provider:  Call Central [facility] Injection scheduling line: [phone]  [email or web address] available online at:  [email or web address]    Please call if any further questions or concerns ([phone]).  Clinic hours 8 am to 5 pm.    Return to clinic (call) if symptoms worsen or fail to improve.

## 2018-10-09 NOTE — LETTER
"    10/9/2018         RE: Shira Rodriguez  [id number] [address] [address]        Dear Colleague,    Thank you for referring your patient, Shira Rodriguez, to the [facility]. Please see a copy of my visit note below.    HISTORY OF PRESENT ILLNESS  Ms. Rodriguez is a pleasant 58 year old year old female who presents to clinic today for followup for low back pain  She thinks it may be time for another injection it was helpful for several months last injection  She has not done PT recently and is willing to  Wants to discuss her previous MRI and plan for an injection    MEDICAL HISTORY  Patient Active Problem List   Diagnosis     Primary localized osteoarthrosis, pelvic region and thigh     Adjustment disorder with depressed mood     Tobacco use disorder     Morbid obesity (H)     Arthritis of knee, degenerative     Degeneration of cervical intervertebral disc     CYRUS (obstructive sleep apnea)     Elevated blood pressure (not hypertension)       Current Outpatient Prescriptions   Medication Sig Dispense Refill     albuterol (PROAIR HFA/PROVENTIL HFA/VENTOLIN HFA) 108 (90 BASE) MCG/ACT Inhaler Inhale 2 puffs into the lungs every 6 hours as needed for shortness of breath / dyspnea or wheezing 1 Inhaler 0     DULoxetine (CYMBALTA) 60 MG EC capsule TK ONE C PO  QAM  0     fluocinonide (LIDEX) 0.05 % ointment Apply topically daily To hands and feet when dermatitis active 60 g 2     folic acid (FOLVITE) 1 MG tablet Take 4 tablets (4 mg) by mouth daily Take 3-4 tablets daily 360 tablet 3     insulin syringe-needle U-100 (BD INSULIN SYRINGE ULTRAFINE) 30G X 1/2\" 1 ML For methotrexate use weekly 8 each prn     leucovorin (WELLCOVORIN) 5 MG tablet Take 1 tablet (5 mg) by mouth daily Once weekly, take 24h after taking weekly methotrexate dose 4 tablet 2     lisinopril (PRINIVIL/ZESTRIL) 10 MG tablet Take 1 tablet (10 mg) by mouth daily 90 tablet 3     methotrexate 50 MG/2ML injection CHEMO " Inject 0.8 mL (20 mg) weekly 4 mL 2     methylPREDNISolone (MEDROL DOSEPAK) 4 MG tablet Follow package instructions 21 tablet 0       Allergies   Allergen Reactions     Adhesive Tape Blisters     Erythromycin Rash       Family History   Problem Relation Age of Onset     Thyroid Disease Mother      Hypertension Mother      Skin Cancer Mother      MMohs surgery with skin graft     Cerebrovascular Disease Mother      CVA after endarderectomy     Diabetes Mother      Breast Cancer Paternal Grandmother      Pancreatic Cancer Paternal Grandmother      Cardiovascular Paternal Aunt      Cardiovascular Paternal Uncle      Depression Other      Alcoholism Father      Thyroid Disease Sister      Alcoholism Sister      Hypertension Maternal Grandmother      HEART DISEASE Sister      multiple ablations     Alcoholism Sister      Prostate Cancer Paternal Grandfather        Additional medical/Social/Surgical histories reviewed in [facility] and updated as appropriate.     REVIEW OF SYSTEMS (10/9/2018)  10 point ROS of systems including Constitutional, Eyes, Respiratory, Cardiovascular, Gastroenterology, Genitourinary, Integumentary, Musculoskeletal, Psychiatric were all negative except for pertinent positives noted in my HPI.     PHYSICAL EXAM  Vitals:    10/09/18 0720   BP: (!) 183/99   Pulse: 98   SpO2: 95%        PHYSICAL EXAM  Vitals:    10/09/18 0720   BP: (!) 183/99   Pulse: 98   SpO2: 95%     Vital Signs: BP (!) 183/99  Pulse 98  SpO2 95% Patient declined being weighed. There is no height or weight on file to calculate BMI.    General  - normal appearance, in no obvious distress  CV  - normal peripheral perfusion  Pulm  - normal respiratory pattern, non-labored  Musculoskeletal - lumbar spine  - stance: normal gait without limp, no obvious leg length discrepancy, normal heel and toe walk  - inspection: normal bone and joint alignment, no obvious scoliosis  - palpation: no paravertebral or bony tenderness  - ROM: flexion  exacerbates pain, normal extension, sidebending, rotation  - strength: lower extremities 5/5 in all planes  - special tests:  (+) straight leg raise  (+) slump test  Neuro  - patellar and Achilles DTRs 2+ bilaterally, lower extremity sensory deficit throughout L5 distribution, grossly normal coordination, normal muscle tone  Skin  - no ecchymosis, erythema, warmth, or induration, no obvious rash  Psych  - interactive, appropriate, normal mood and affect    ASSESSMENT & PLAN  59 yo female with lumbar ddd, radicular pain  Medrol pack  Gabapentin  MEAGHAN ordered  PT ordered  F/u in 1 - 2 months    Tashi Saxena MD, CAQSM    Again, thank you for allowing me to participate in the care of your patient.        Sincerely,        Tashi Saxena MD

## 2018-10-09 NOTE — NURSING NOTE
Shira Rodriguez's chief complaint for this visit includes:  Chief Complaint   Patient presents with     RECHECK     follow up back problems     PCP: Anjel Busby    Referring Provider:  No referring provider defined for this encounter.    BP (!) 183/99  Pulse 98  SpO2 95%  Extreme Pain (8)     Do you need any medication refills at today's visit? no

## 2018-10-10 RX ORDER — METHYLPREDNISOLONE 4 MG
TABLET, DOSE PACK ORAL
Qty: 21 TABLET | Refills: 0 | Status: SHIPPED | OUTPATIENT
Start: 2018-10-10 | End: 2019-04-08

## 2018-10-10 RX ORDER — GABAPENTIN 100 MG/1
100 CAPSULE ORAL 4 TIMES DAILY
Qty: 120 CAPSULE | Refills: 0 | Status: SHIPPED | OUTPATIENT
Start: 2018-10-10 | End: 2019-04-08

## 2018-11-02 ENCOUNTER — RADIANT APPOINTMENT (OUTPATIENT)
Dept: GENERAL RADIOLOGY | Facility: CLINIC | Age: 58
End: 2018-11-02
Attending: PREVENTIVE MEDICINE
Payer: COMMERCIAL

## 2018-11-02 VITALS
SYSTOLIC BLOOD PRESSURE: 144 MMHG | HEART RATE: 108 BPM | RESPIRATION RATE: 16 BRPM | DIASTOLIC BLOOD PRESSURE: 82 MMHG | TEMPERATURE: 97.7 F | OXYGEN SATURATION: 97 %

## 2018-11-02 RX ORDER — METHYLPREDNISOLONE ACETATE 80 MG/ML
80 INJECTION, SUSPENSION INTRA-ARTICULAR; INTRALESIONAL; INTRAMUSCULAR; SOFT TISSUE ONCE
Status: COMPLETED | OUTPATIENT
Start: 2018-11-02 | End: 2018-11-02

## 2018-11-02 RX ORDER — BUPIVACAINE HYDROCHLORIDE 5 MG/ML
10 INJECTION, SOLUTION EPIDURAL; INTRACAUDAL ONCE
Status: COMPLETED | OUTPATIENT
Start: 2018-11-02 | End: 2018-11-02

## 2018-11-02 RX ORDER — LIDOCAINE HYDROCHLORIDE 10 MG/ML
30 INJECTION, SOLUTION EPIDURAL; INFILTRATION; INTRACAUDAL; PERINEURAL ONCE
Status: COMPLETED | OUTPATIENT
Start: 2018-11-02 | End: 2018-11-02

## 2018-11-02 RX ORDER — IOPAMIDOL 408 MG/ML
20 INJECTION, SOLUTION INTRATHECAL ONCE
Status: COMPLETED | OUTPATIENT
Start: 2018-11-02 | End: 2018-11-02

## 2018-11-02 RX ADMIN — LIDOCAINE HYDROCHLORIDE 5 ML: 10 INJECTION, SOLUTION EPIDURAL; INFILTRATION; INTRACAUDAL; PERINEURAL at 09:47

## 2018-11-02 RX ADMIN — BUPIVACAINE HYDROCHLORIDE 2 ML: 5 INJECTION, SOLUTION EPIDURAL; INTRACAUDAL at 09:47

## 2018-11-02 RX ADMIN — METHYLPREDNISOLONE ACETATE 80 MG: 80 INJECTION, SUSPENSION INTRA-ARTICULAR; INTRALESIONAL; INTRAMUSCULAR; SOFT TISSUE at 09:47

## 2018-11-02 RX ADMIN — IOPAMIDOL 2 ML: 408 INJECTION, SOLUTION INTRATHECAL at 09:47

## 2018-11-02 NOTE — MR AVS SNAPSHOT
"                  MRN:[id number]                      After Visit Summary   11/2/2018    Shira Rodriguez    MRN: [id number]           Visit Information        Provider Department      11/2/2018 9:30 AM  NEURO RAD;  IMAGING NURSE; XR3 [facility]           Review of your medicines          These changes are accurate as of 11/2/18  9:26 AM.  If you have any questions, ask your nurse or doctor.               CONTINUE these medicines which have NOT CHANGED        Dose / Directions    albuterol 108 (90 Base) MCG/ACT inhaler   Commonly known as:  PROAIR HFA/PROVENTIL HFA/VENTOLIN HFA   Used for:  SANTOS (dyspnea on exertion)        Dose:  2 puff   Inhale 2 puffs into the lungs every 6 hours as needed for shortness of breath / dyspnea or wheezing   Quantity:  1 Inhaler   Refills:  0       DULoxetine 60 MG EC capsule   Commonly known as:  CYMBALTA        TK ONE C PO  QAM   Refills:  0       fluocinonide 0.05 % ointment   Commonly known as:  LIDEX   Used for:  Dermatitis        Apply topically daily To hands and feet when dermatitis active   Quantity:  60 g   Refills:  2       folic acid 1 MG tablet   Commonly known as:  FOLVITE   Used for:  High risk medication use, Rheumatoid arthritis involving multiple sites with positive rheumatoid factor (H)        Dose:  4 mg   Take 4 tablets (4 mg) by mouth daily Take 3-4 tablets daily   Quantity:  360 tablet   Refills:  3       gabapentin 100 MG capsule   Commonly known as:  NEURONTIN   Used for:  Lumbar degenerative disc disease        Dose:  100 mg   Take 1 capsule (100 mg) by mouth 4 times daily   Quantity:  120 capsule   Refills:  0       insulin syringe-needle U-100 30G X 1/2\" 1 ML   Commonly known as:  BD insulin syringe ultrafine   Used for:  Rheumatoid arthritis involving multiple sites with positive rheumatoid factor (H)        For methotrexate use weekly   Quantity:  8 each   Refills:  prn       leucovorin 5 MG tablet   Commonly known as:  WELLCOVORIN "   Used for:  Adverse effect of methotrexate, initial encounter        Dose:  5 mg   Take 1 tablet (5 mg) by mouth daily Once weekly, take 24h after taking weekly methotrexate dose   Quantity:  4 tablet   Refills:  2       lisinopril 10 MG tablet   Commonly known as:  PRINIVIL/ZESTRIL   Used for:  Essential hypertension        Dose:  10 mg   Take 1 tablet (10 mg) by mouth daily   Quantity:  90 tablet   Refills:  3       methotrexate 50 MG/2ML injection CHEMO   Used for:  Rheumatoid arthritis involving multiple sites with positive rheumatoid factor (H)        Inject 0.8 mL (20 mg) weekly   Quantity:  4 mL   Refills:  2       * methylPREDNISolone 4 MG tablet   Commonly known as:  MEDROL DOSEPAK   Used for:  SANTOS (dyspnea on exertion)        Follow package instructions   Quantity:  21 tablet   Refills:  0       * methylPREDNISolone 4 MG tablet   Commonly known as:  MEDROL DOSEPAK   Used for:  Lumbar degenerative disc disease        Follow package instructions   Quantity:  21 tablet   Refills:  0       * Notice:  This list has 2 medication(s) that are the same as other medications prescribed for you. Read the directions carefully, and ask your doctor or other care provider to review them with you.             Protect others around you: Learn how to safely use, store and throw away your medicines at www.disposemymeds.org.         Follow-ups after your visit        Your next 10 appointments already scheduled     Nov 02, 2018  9:30 AM CDT   XR LUMBAR EPIDURAL INJECTION with MARIBELL3, JESI IMAGING NURSE, JESI NEURO RAD   [facility] ([facility])    [address] [id number] [phone]           How do I prepare for my exam? (Food and drink instructions) No Food and Drink Restrictions.  How do I prepare for my exam? (Other instructions) * If you take Coumadin (or any other blood thinners) you may need to stop taking them up to 5 days prior to the exam.  Talk to your doctor before stopping. * If you take Plavix, Ticlid, Pletal or Persantine, please ask your doctor if you should stop these medicines. You may need extra tests on the morning of your scan. * If you take Xarelto (Rivaroxaban), you may need to stop taking it 24 hours before treatment. Talk to your doctor before stopping this medicine.  What should I wear: Wear comfortable clothes.  How long does the exam take: Injections take about 30 to 45 minutes. Most people spend up to 2 hours in the clinic or hospital.  What should I bring: Please bring any scans or X-rays taken at other hospitals, if similar tests were done. Also bring a list of your medicines, including vitamins, minerals and over-the-counter drugs. It is safest to leave personal items at home. You will need a  : Plan to have someone drive you home afterward.  What do I need to tell my doctor: Tell your doctor in advance: * If you are allergic to X-ray dye (contrast fluid). * If you may be pregnant.  What should I do after the exam: You may have slight cramping during this exam. The cramps should go away afterward. You may have some spotting after the exam.  What is this test: A spinal shot is done in or near the spine. You may receive steroid medicine (to reduce swelling) or contrast fluid (dye that makes the area show up more clearly on X-rays). A nerve root shot is a shot into the nerve near the spine. It may reduce inflammation near the nerve root or spinal cord and reduce pain in the arm or leg.  Who should I call with questions: If you have any questions, please call the Imaging Department where you will have your exam. Directions, parking instructions, and other information is available on our website, [email or web address].            Feb 06, 2019  7:00 AM CST   (Arrive by 6:45 AM)   Return Visit with Everett Austin MD   [facility] ([facility] [place])    [address] [facility]  [address] [place] [address]  [id number] [phone]               Care Instructions        Further instructions from your care team       Discharge Instructions for Epidural Steroid Injection/Nerve Block    Care of injection site:    If you have new numbness down your leg after the injection, this may last up to 4-6 hours, but should go away. You may need help with walking until your leg feels normal.    Over the next 24 to 48 hours, pain at the injection site may increase before it gets better.    For the next 48 hours, use ice packs for 15 minutes,3-4 times a day for pain relief.    If you have a bandage, you may remove it the next morning     Do not submerge injection site in water for 24 hours, (no baths. pools, hot tubs). Showers are OK.            Activity:    You should relax today and then you may return to your regular activity tomorrow.    You may eat a normal diet.    Medicines:    Restart all your medicines tomorrow at your regular dose, including Coumadin (Warfarin).    If you are restarting Coumadin, talk to your doctor about having your INR checked.    If you received steroids: The steroid should help reduce swelling and pain. This may take from 3-14 days. Pain from the shot will be mild and go away in 2-3 days.     Common side effects may include:    Insomnia    Heartburn    Flushed face    Water retention    Increased appetite    Increased blood sugar      If you have diabetes, watch your blood sugar closely, If needed, call your doctor to help control your blood sugar.    Some patients will get lasting relief from a single injection. Others may require additional injections to get results.     Call your doctor or go to the Emergency Room if you have new severe pain or fever.     Additional Information About Your Visit        [employer] Information     [employer] gives you secure access to your electronic health record. If you see a primary care provider, you can also send messages to your care team and make appointments. If you  have questions, please call your primary care clinic.  If you do not have a primary care provider, please call [phone] and they will assist you.      [employer] is an electronic gateway that provides easy, online access to your medical records. With [employer], you can request a clinic appointment, read your test results, renew a prescription or communicate with your care team.     To access your existing account, please contact your [facility] or call [phone] for assistance.        Care EveryWhere ID     This is your Care EveryWhere ID. This could be used by other organizations to access your [facility] medical records  [id number]         Primary Care Provider Office Phone # Fax #    Anjel Busby [phone]      Equal Access to Services     VARSHA FLOREZ : Dutch bocanegra Soalisha, waaxda luqadaha, qaybta kaalmada adejosefayaaditya, andreas herring . So [facility] [phone].    ATENCIÓN: Si habla español, tiene a samano disposición servicios gratuitos de asistencia lingüística. Llame al [phone].    We comply with applicable federal civil rights laws and [place] laws. We do not discriminate on the basis of race, color, national origin, age, disability, sex, sexual orientation, or gender identity.            Thank you!     Thank you for choosing [facility] for your care. Our goal is always to provide you with excellent care. Hearing back from our patients is one way we can continue to improve our services. Please take a few minutes to complete the written survey that you may receive in the mail after you visit with us. Thank you!             Medication List: This is a list of all your medications and when to take them. Check marks below indicate your daily home schedule. Keep this list as a reference.          This list is accurate as of 11/2/18  9:26 AM.  Always use your most recent med list.               Medications           Morning  "Afternoon Evening Bedtime As Needed    albuterol 108 (90 Base) MCG/ACT inhaler   Commonly known as:  PROAIR HFA/PROVENTIL HFA/VENTOLIN HFA   Inhale 2 puffs into the lungs every 6 hours as needed for shortness of breath / dyspnea or wheezing                                DULoxetine 60 MG EC capsule   Commonly known as:  CYMBALTA   TK ONE C PO  QAM                                fluocinonide 0.05 % ointment   Commonly known as:  LIDEX   Apply topically daily To hands and feet when dermatitis active                                folic acid 1 MG tablet   Commonly known as:  FOLVITE   Take 4 tablets (4 mg) by mouth daily Take 3-4 tablets daily                                gabapentin 100 MG capsule   Commonly known as:  NEURONTIN   Take 1 capsule (100 mg) by mouth 4 times daily                                insulin syringe-needle U-100 30G X 1/2\" 1 ML   Commonly known as:  BD insulin syringe ultrafine   For methotrexate use weekly                                leucovorin 5 MG tablet   Commonly known as:  WELLCOVORIN   Take 1 tablet (5 mg) by mouth daily Once weekly, take 24h after taking weekly methotrexate dose                                lisinopril 10 MG tablet   Commonly known as:  PRINIVIL/ZESTRIL   Take 1 tablet (10 mg) by mouth daily                                methotrexate 50 MG/2ML injection CHEMO   Inject 0.8 mL (20 mg) weekly                                * methylPREDNISolone 4 MG tablet   Commonly known as:  MEDROL DOSEPAK   Follow package instructions                                * methylPREDNISolone 4 MG tablet   Commonly known as:  MEDROL DOSEPAK   Follow package instructions                                * Notice:  This list has 2 medication(s) that are the same as other medications prescribed for you. Read the directions carefully, and ask your doctor or other care provider to review them with you.      "

## 2018-12-12 ENCOUNTER — TELEPHONE (OUTPATIENT)
Dept: RHEUMATOLOGY | Facility: CLINIC | Age: 58
End: 2018-12-12

## 2018-12-12 NOTE — TELEPHONE ENCOUNTER
Prior Authorization Retail Medication Request    Medication/Dose: folic acid 1 mg  ICD code (if different than what is on RX):  M05.79  Previously Tried and Failed:    Rationale:      Insurance Name:  [facility]  Insurance ID:  [id number]      Pharmacy Information (if different than what is on RX)  Name:  [facility]  Phone:

## 2018-12-13 NOTE — TELEPHONE ENCOUNTER
Central Prior Authorization Team   [phone]    PA Initiation    Medication: folic acid 1 mg  Insurance Company: [facility] - Phone [phone] Fax [phone]  Pharmacy Filling the Rx: [address] [address] - [place] - [address] AT [facility] [address]  Filling Pharmacy Phone: [phone]  Filling Pharmacy Fax: [phone]  Start Date: 12/13/2018

## 2018-12-14 NOTE — TELEPHONE ENCOUNTER
PRIOR AUTHORIZATION DENIED    Medication: folic acid 1 mg-PA DENIED     Denial Date: 12/14/2018    Denial Rational: Criteria Not Met. Insurance states that patient must tried/failed folic acid 0.8 mg.         Appeal Information: If provider would like to appeal please provide a letter of medical necessity stating why formulary alternatives would not be clinically appropriate for patient and route back to the PA team.

## 2018-12-18 NOTE — TELEPHONE ENCOUNTER
Contacted pt and told her that her insurance will not cover the Folic Acid. We did discuss that this medication is relatively inexpensive, but if pt would go to e|tab on line she would be able to print a coupon and get the med between $9.35 to 11.50. Explained how this web site worked-they don't submit to insurance which keeps the prices down. She will check into this as the prices seem reasonable to her.    Shira also reported she has a job now and will be changing insurance in the future. Pt had not additional questions at this time.    Felipe Armijo, BSN RN  Rheumatology RN Coordinator  [facility]

## 2019-01-09 DIAGNOSIS — M05.79 RHEUMATOID ARTHRITIS INVOLVING MULTIPLE SITES WITH POSITIVE RHEUMATOID FACTOR (H): ICD-10-CM

## 2019-01-10 RX ORDER — METHOTREXATE 25 MG/ML
20 INJECTION, SOLUTION INTRA-ARTERIAL; INTRAMUSCULAR; INTRAVENOUS
Qty: 4 ML | Refills: 2 | Status: SHIPPED | OUTPATIENT
Start: 2019-01-10 | End: 2019-02-27

## 2019-01-10 NOTE — TELEPHONE ENCOUNTER
methotrexate 50 MG/2ML injection CHEMO      Last Written Prescription Date:  10-3-18  Last Fill Quantity: 4 ml,   # refills: 2  Last Office Visit: 10-3-18  Future Office visit:  2-6-19    CBC RESULTS:   Recent Labs   Lab Test 06/04/18  1200   WBC 7.5   RBC 4.97   HGB 15.8*   HCT 47.5*   MCV 96   MCH 31.8   MCHC 33.3   RDW 13.7          Creatinine   Date Value Ref Range Status   06/04/2018 0.56 0.52 - 1.04 mg/dL Final   ]    Liver Function Studies -   Recent Labs   Lab Test 06/04/18  1200  09/18/17  1644   PROTTOTAL  --   --  7.1   ALBUMIN 3.3*   < > 2.9*   BILITOTAL  --   --  0.4   ALKPHOS  --   --  67   AST 22   < > 11   ALT 40   < > 28    < > = values in this interval not displayed.       Routing refill request to provider for review/approval because:  Refill protocol.    FYI to clinic RN: last labs-6-4-18

## 2019-01-14 ENCOUNTER — MYC MEDICAL ADVICE (OUTPATIENT)
Dept: ORTHOPEDICS | Facility: CLINIC | Age: 59
End: 2019-01-14

## 2019-01-25 ENCOUNTER — DOCUMENTATION ONLY (OUTPATIENT)
Dept: CARE COORDINATION | Facility: CLINIC | Age: 59
End: 2019-01-25

## 2019-01-28 ENCOUNTER — OFFICE VISIT (OUTPATIENT)
Dept: ORTHOPEDICS | Facility: CLINIC | Age: 59
End: 2019-01-28
Payer: COMMERCIAL

## 2019-01-28 VITALS — SYSTOLIC BLOOD PRESSURE: 153 MMHG | HEART RATE: 104 BPM | DIASTOLIC BLOOD PRESSURE: 103 MMHG

## 2019-01-28 DIAGNOSIS — M51.16 LUMBAR DISC HERNIATION WITH RADICULOPATHY: Primary | ICD-10-CM

## 2019-01-28 PROCEDURE — 99214 OFFICE O/P EST MOD 30 MIN: CPT | Performed by: PREVENTIVE MEDICINE

## 2019-01-28 RX ORDER — METHYLPREDNISOLONE 4 MG
TABLET, DOSE PACK ORAL
Qty: 21 TABLET | Refills: 0 | Status: SHIPPED | OUTPATIENT
Start: 2019-01-28 | End: 2019-04-08

## 2019-01-28 ASSESSMENT — PAIN SCALES - GENERAL: PAINLEVEL: EXTREME PAIN (8)

## 2019-01-28 NOTE — PATIENT INSTRUCTIONS
Thanks for coming today.  Ortho[facility] Medicine Clinic  [address] [place] [address]    To schedule future appointments in Ortho Clinic, you may call [phone].    To schedule ordered imaging by your provider:   Call Central Imaging [phone]    To schedule an injection ordered by your provider:  Call Central Imaging Injection scheduling line: [phone]  [facility] available online at:  [email or web address]    Please call if any further questions or concerns ([phone]).  Clinic hours 8 am to 5 pm.    Return to clinic (call) if symptoms worsen or fail to improve.

## 2019-01-28 NOTE — PROGRESS NOTES
"HISTORY OF PRESENT ILLNESS  Ms. Rodriguez is a pleasant 58 year old year old female who presents to clinic today for followup for lumbar radicular pain  She had injection last oct. And did not followup  She also did not go to PT  She still has pain in her low back and has pain in both achilles still  She had taken gabapentin and was continuing to have pain in both of her heels.    MEDICAL HISTORY  Patient Active Problem List   Diagnosis     Primary localized osteoarthrosis, pelvic region and thigh     Adjustment disorder with depressed mood     Tobacco use disorder     Morbid obesity (H)     Arthritis of knee, degenerative     Degeneration of cervical intervertebral disc     CYRUS (obstructive sleep apnea)     Elevated blood pressure (not hypertension)       Current Outpatient Medications   Medication Sig Dispense Refill     albuterol (2.5 MG/3ML) 0.083% neb solution Take 1 vial (2.5 mg) by nebulization once for 1 dose 3 mL 0     albuterol (PROAIR HFA/PROVENTIL HFA/VENTOLIN HFA) 108 (90 BASE) MCG/ACT Inhaler Inhale 2 puffs into the lungs every 6 hours as needed for shortness of breath / dyspnea or wheezing 1 Inhaler 0     DULoxetine (CYMBALTA) 60 MG EC capsule TK ONE C PO  QAM  0     fluocinonide (LIDEX) 0.05 % ointment Apply topically daily To hands and feet when dermatitis active 60 g 2     folic acid (FOLVITE) 1 MG tablet Take 4 tablets (4 mg) by mouth daily Take 3-4 tablets daily 360 tablet 3     gabapentin (NEURONTIN) 100 MG capsule Take 1 capsule (100 mg) by mouth 4 times daily 120 capsule 0     insulin syringe-needle U-100 (BD INSULIN SYRINGE ULTRAFINE) 30G X 1/2\" 1 ML For methotrexate use weekly 8 each prn     leucovorin (WELLCOVORIN) 5 MG tablet Take 1 tablet (5 mg) by mouth daily Once weekly, take 24h after taking weekly methotrexate dose 4 tablet 2     lisinopril (PRINIVIL/ZESTRIL) 10 MG tablet Take 1 tablet (10 mg) by mouth daily 90 tablet 3     methotrexate 50 MG/2ML injection CHEMO Inject 0.8 mLs (20 mg) " Subcutaneous every 7 days Inject 0.8 mL (20 mg) weekly 4 mL 2     methylPREDNISolone (MEDROL DOSEPAK) 4 MG tablet Follow package instructions 21 tablet 0     methylPREDNISolone (MEDROL DOSEPAK) 4 MG tablet Follow package instructions 21 tablet 0       Allergies   Allergen Reactions     Adhesive Tape Blisters     Erythromycin Rash       Family History   Problem Relation Age of Onset     Thyroid Disease Mother      Hypertension Mother      Skin Cancer Mother         MMohs surgery with skin graft     Cerebrovascular Disease Mother         CVA after endarderectomy     Diabetes Mother      Breast Cancer Paternal Grandmother      Pancreatic Cancer Paternal Grandmother      Cardiovascular Paternal Aunt      Cardiovascular Paternal Uncle      Depression Other      Alcoholism Father      Thyroid Disease Sister      Alcoholism Sister      Hypertension Maternal Grandmother      Heart Disease Sister         multiple ablations     Alcoholism Sister      Prostate Cancer Paternal Grandfather        Additional medical/Social/Surgical histories reviewed in [facility] and updated as appropriate.     REVIEW OF SYSTEMS (1/28/2019)  10 point ROS of systems including Constitutional, Eyes, Respiratory, Cardiovascular, Gastroenterology, Genitourinary, Integumentary, Musculoskeletal, Psychiatric were all negative except for pertinent positives noted in my HPI.     PHYSICAL EXAM  Vitals:    01/28/19 0803   BP: (!) 153/103   BP Location: Left arm   Patient Position: Sitting   Cuff Size: Adult Regular   Pulse: 104     Vital Signs: BP (!) 153/103 (BP Location: Left arm, Patient Position: Sitting, Cuff Size: Adult Regular)   Pulse 104  Patient declined being weighed. There is no height or weight on file to calculate BMI.    General  - normal appearance, in no obvious distress, obese  CV  - normal peripheral perfusion  Pulm  - normal respiratory pattern, non-labored  Musculoskeletal - lumbar spine  - stance: normal gait without limp, no obvious leg  length discrepancy, normal heel and toe walk  - inspection: normal bone and joint alignment, no obvious scoliosis  - palpation: no paravertebral or bony tenderness  - ROM: flexion exacerbates pain in low back, normal extension, sidebending, rotation  - strength: lower extremities 5/5 in all planes  - special tests:  (+) straight leg raise  (+) slump test  Neuro  - patellar and Achilles DTRs 2+ bilaterally, bilateral lower extremity sensory deficit throughout L5 distribution, grossly normal coordination, normal muscle tone  Skin  - no ecchymosis, erythema, warmth, or induration, no obvious rash  Psych  - interactive, appropriate, normal mood and affect  Bilateral ankles: has bilateral pain in both ankles/achilles, and swelling at distal achilles  Positive SLR on left worse  ASSESSMENT & PLAN  57 yo female with lumbar ddd, radicular pain and bilateral achilles bursitis/tendinitis  Reviewed lumbar MRI: shows multi-level ddd  Ordered another injection for lumbar spine  Cont. Gabapentin  Medrol pack  followup in 2-3 weeks after injection  Start Pool PT asap  F/u with sleep medicine physician to discuss her CYRUS  Cont. To followup with praveen and cont. followup with rheumatology      Tashi Saxena MD, CAQSM

## 2019-01-28 NOTE — LETTER
"    1/28/2019         RE: Shira Rodriguez  [id number] [address] [address]        Dear Colleague,    Thank you for referring your patient, Shira Rodriguez, to the [facility]. Please see a copy of my visit note below.    HISTORY OF PRESENT ILLNESS  Ms. Rodriguez is a pleasant 58 year old year old female who presents to clinic today for followup for lumbar radicular pain  She had injection last oct. And did not followup  She also did not go to PT  She still has pain in her low back and has pain in both achilles still  She had taken gabapentin and was continuing to have pain in both of her heels.    MEDICAL HISTORY  Patient Active Problem List   Diagnosis     Primary localized osteoarthrosis, pelvic region and thigh     Adjustment disorder with depressed mood     Tobacco use disorder     Morbid obesity (H)     Arthritis of knee, degenerative     Degeneration of cervical intervertebral disc     CYRUS (obstructive sleep apnea)     Elevated blood pressure (not hypertension)       Current Outpatient Medications   Medication Sig Dispense Refill     albuterol (2.5 MG/3ML) 0.083% neb solution Take 1 vial (2.5 mg) by nebulization once for 1 dose 3 mL 0     albuterol (PROAIR HFA/PROVENTIL HFA/VENTOLIN HFA) 108 (90 BASE) MCG/ACT Inhaler Inhale 2 puffs into the lungs every 6 hours as needed for shortness of breath / dyspnea or wheezing 1 Inhaler 0     DULoxetine (CYMBALTA) 60 MG EC capsule TK ONE C PO  QAM  0     fluocinonide (LIDEX) 0.05 % ointment Apply topically daily To hands and feet when dermatitis active 60 g 2     folic acid (FOLVITE) 1 MG tablet Take 4 tablets (4 mg) by mouth daily Take 3-4 tablets daily 360 tablet 3     gabapentin (NEURONTIN) 100 MG capsule Take 1 capsule (100 mg) by mouth 4 times daily 120 capsule 0     insulin syringe-needle U-100 (BD INSULIN SYRINGE ULTRAFINE) 30G X 1/2\" 1 ML For methotrexate use weekly 8 each prn     leucovorin (WELLCOVORIN) 5 MG tablet Take 1 " tablet (5 mg) by mouth daily Once weekly, take 24h after taking weekly methotrexate dose 4 tablet 2     lisinopril (PRINIVIL/ZESTRIL) 10 MG tablet Take 1 tablet (10 mg) by mouth daily 90 tablet 3     methotrexate 50 MG/2ML injection CHEMO Inject 0.8 mLs (20 mg) Subcutaneous every 7 days Inject 0.8 mL (20 mg) weekly 4 mL 2     methylPREDNISolone (MEDROL DOSEPAK) 4 MG tablet Follow package instructions 21 tablet 0     methylPREDNISolone (MEDROL DOSEPAK) 4 MG tablet Follow package instructions 21 tablet 0       Allergies   Allergen Reactions     Adhesive Tape Blisters     Erythromycin Rash       Family History   Problem Relation Age of Onset     Thyroid Disease Mother      Hypertension Mother      Skin Cancer Mother         MMohs surgery with skin graft     Cerebrovascular Disease Mother         CVA after endarderectomy     Diabetes Mother      Breast Cancer Paternal Grandmother      Pancreatic Cancer Paternal Grandmother      Cardiovascular Paternal Aunt      Cardiovascular Paternal Uncle      Depression Other      Alcoholism Father      Thyroid Disease Sister      Alcoholism Sister      Hypertension Maternal Grandmother      Heart Disease Sister         multiple ablations     Alcoholism Sister      Prostate Cancer Paternal Grandfather        Additional medical/Social/Surgical histories reviewed in [facility] and updated as appropriate.     REVIEW OF SYSTEMS (1/28/2019)  10 point ROS of systems including Constitutional, Eyes, Respiratory, Cardiovascular, Gastroenterology, Genitourinary, Integumentary, Musculoskeletal, Psychiatric were all negative except for pertinent positives noted in my HPI.     PHYSICAL EXAM  Vitals:    01/28/19 0803   BP: (!) 153/103   BP Location: Left arm   Patient Position: Sitting   Cuff Size: Adult Regular   Pulse: 104     Vital Signs: BP (!) 153/103 (BP Location: Left arm, Patient Position: Sitting, Cuff Size: Adult Regular)   Pulse 104  Patient declined being weighed. There is no height or  weight on file to calculate BMI.    General  - normal appearance, in no obvious distress, obese  CV  - normal peripheral perfusion  Pulm  - normal respiratory pattern, non-labored  Musculoskeletal - lumbar spine  - stance: normal gait without limp, no obvious leg length discrepancy, normal heel and toe walk  - inspection: normal bone and joint alignment, no obvious scoliosis  - palpation: no paravertebral or bony tenderness  - ROM: flexion exacerbates pain in low back, normal extension, sidebending, rotation  - strength: lower extremities 5/5 in all planes  - special tests:  (+) straight leg raise  (+) slump test  Neuro  - patellar and Achilles DTRs 2+ bilaterally, bilateral lower extremity sensory deficit throughout L5 distribution, grossly normal coordination, normal muscle tone  Skin  - no ecchymosis, erythema, warmth, or induration, no obvious rash  Psych  - interactive, appropriate, normal mood and affect  Bilateral ankles: has bilateral pain in both ankles/achilles, and swelling at distal achilles  Positive SLR on left worse  ASSESSMENT & PLAN  59 yo female with lumbar ddd, radicular pain and bilateral achilles bursitis/tendinitis  Reviewed lumbar MRI: shows multi-level ddd  Ordered another injection for lumbar spine  Cont. Gabapentin  Medrol pack  followup in 2-3 weeks after injection  Start Pool PT asap  F/u with sleep medicine physician to discuss her CYRUS  Cont. To followup with pysch and cont. followup with rheumatology      Tashi Saxena MD, CAQSM    Again, thank you for allowing me to participate in the care of your patient.        Sincerely,        Tashi Saxena MD

## 2019-01-28 NOTE — NURSING NOTE
Shira Rodriguez's chief complaint for this visit includes:  Chief Complaint   Patient presents with     Lower Back - Follow Up     PCP: Anjel Busby    Referring Provider:  No referring provider defined for this encounter.    BP (!) 153/103 (BP Location: Left arm, Patient Position: Sitting, Cuff Size: Adult Regular)   Pulse 104   Extreme Pain (8)     Do you need any medication refills at today's visit? No    Marybel Cartagena LPN

## 2019-01-31 ENCOUNTER — HOSPITAL ENCOUNTER (OUTPATIENT)
Dept: GENERAL RADIOLOGY | Facility: CLINIC | Age: 59
End: 2019-01-31
Attending: RADIOLOGY | Admitting: RADIOLOGY
Payer: COMMERCIAL

## 2019-01-31 ENCOUNTER — HOSPITAL ENCOUNTER (OUTPATIENT)
Facility: CLINIC | Age: 59
Discharge: HOME | End: 2019-01-31
Admitting: PREVENTIVE MEDICINE
Payer: COMMERCIAL

## 2019-01-31 VITALS
TEMPERATURE: 96.9 F | HEART RATE: 101 BPM | OXYGEN SATURATION: 98 % | SYSTOLIC BLOOD PRESSURE: 174 MMHG | DIASTOLIC BLOOD PRESSURE: 95 MMHG | RESPIRATION RATE: 18 BRPM

## 2019-01-31 DIAGNOSIS — M51.16 LUMBAR DISC HERNIATION WITH RADICULOPATHY: ICD-10-CM

## 2019-01-31 PROCEDURE — 40000863 ZZH STATISTIC RADIOLOGY XRAY, US, CT, MAR, NM

## 2019-01-31 PROCEDURE — 25000125 ZZHC RX 250: Performed by: PHYSICIAN ASSISTANT

## 2019-01-31 PROCEDURE — 25500064 ZZH RX 255 OP 636: Performed by: PHYSICIAN ASSISTANT

## 2019-01-31 PROCEDURE — 62323 NJX INTERLAMINAR LMBR/SAC: CPT

## 2019-01-31 PROCEDURE — 25000128 H RX IP 250 OP 636: Performed by: PHYSICIAN ASSISTANT

## 2019-01-31 RX ORDER — NICOTINE POLACRILEX 4 MG
15-30 LOZENGE BUCCAL
Status: CANCELLED | OUTPATIENT
Start: 2019-01-31

## 2019-01-31 RX ORDER — LIDOCAINE HYDROCHLORIDE 10 MG/ML
30 INJECTION, SOLUTION EPIDURAL; INFILTRATION; INTRACAUDAL; PERINEURAL ONCE
Status: COMPLETED | OUTPATIENT
Start: 2019-01-31 | End: 2019-01-31

## 2019-01-31 RX ORDER — DEXTROSE MONOHYDRATE 25 G/50ML
25-50 INJECTION, SOLUTION INTRAVENOUS
Status: CANCELLED | OUTPATIENT
Start: 2019-01-31

## 2019-01-31 RX ORDER — NICOTINE POLACRILEX 4 MG
15-30 LOZENGE BUCCAL
Status: DISCONTINUED | OUTPATIENT
Start: 2019-01-31 | End: 2019-01-31 | Stop reason: HOSPADM

## 2019-01-31 RX ORDER — IOPAMIDOL 408 MG/ML
10 INJECTION, SOLUTION INTRATHECAL ONCE
Status: COMPLETED | OUTPATIENT
Start: 2019-01-31 | End: 2019-01-31

## 2019-01-31 RX ORDER — DEXTROSE MONOHYDRATE 25 G/50ML
25-50 INJECTION, SOLUTION INTRAVENOUS
Status: DISCONTINUED | OUTPATIENT
Start: 2019-01-31 | End: 2019-01-31 | Stop reason: HOSPADM

## 2019-01-31 RX ORDER — DEXAMETHASONE SODIUM PHOSPHATE 10 MG/ML
20 INJECTION, SOLUTION INTRAMUSCULAR; INTRAVENOUS ONCE
Status: COMPLETED | OUTPATIENT
Start: 2019-01-31 | End: 2019-01-31

## 2019-01-31 RX ORDER — LIDOCAINE HYDROCHLORIDE 10 MG/ML
5 INJECTION, SOLUTION EPIDURAL; INFILTRATION; INTRACAUDAL; PERINEURAL ONCE
Status: COMPLETED | OUTPATIENT
Start: 2019-01-31 | End: 2019-01-31

## 2019-01-31 RX ADMIN — IOPAMIDOL 1 ML: 408 INJECTION, SOLUTION INTRATHECAL at 11:10

## 2019-01-31 RX ADMIN — LIDOCAINE HYDROCHLORIDE 3 ML: 10 INJECTION, SOLUTION EPIDURAL; INFILTRATION; INTRACAUDAL; PERINEURAL at 11:10

## 2019-01-31 RX ADMIN — DEXAMETHASONE SODIUM PHOSPHATE 20 MG: 10 INJECTION, SOLUTION INTRAMUSCULAR; INTRAVENOUS at 11:09

## 2019-01-31 RX ADMIN — LIDOCAINE HYDROCHLORIDE 3 ML: 10 INJECTION, SOLUTION EPIDURAL; INFILTRATION; INTRACAUDAL; PERINEURAL at 11:04

## 2019-01-31 NOTE — PROGRESS NOTES
RADIOLOGY PROCEDURE NOTE  Patient name: Shira Rodriguez  MRN: [id number]  : 1960    Pre-procedure diagnosis: Low back pain  Post-procedure diagnosis: Same    Procedure Date/Time: 2019  11:19 AM  Procedure: L4-5 Left TFESI  Estimated blood loss: None  Specimen(s) collected with description: none  The patient tolerated the procedure well with no immediate complications.  Significant findings:none    See imaging dictation for procedural details.    Provider name: Sukumar Sanchez  Assistant(s):None

## 2019-01-31 NOTE — IP AVS SNAPSHOT
MRN:[id number]                      After Visit Summary   1/31/2019    Shira Rodriguez    MRN: [id number]           Visit Information        Department      1/31/2019  9:25 AM [facility]          Review of your medicines      UNREVIEWED medicines. Ask your doctor about these medicines       Dose / Directions   * albuterol (2.5 MG/3ML) 0.083% neb solution  Commonly known as:  PROVENTIL  Used for:  Cough      Dose:  1 vial  Take 1 vial (2.5 mg) by nebulization once for 1 dose  Quantity:  3 mL  Refills:  0     * albuterol 108 (90 Base) MCG/ACT inhaler  Commonly known as:  PROAIR HFA/PROVENTIL HFA/VENTOLIN HFA  Used for:  SANTOS (dyspnea on exertion)      Dose:  2 puff  Inhale 2 puffs into the lungs every 6 hours as needed for shortness of breath / dyspnea or wheezing  Quantity:  1 Inhaler  Refills:  0     DULoxetine 60 MG capsule  Commonly known as:  CYMBALTA      TK ONE C PO  QAM  Refills:  0     fluocinonide 0.05 % external ointment  Commonly known as:  LIDEX  Used for:  Dermatitis      Apply topically daily To hands and feet when dermatitis active  Quantity:  60 g  Refills:  2     folic acid 1 MG tablet  Commonly known as:  FOLVITE  Used for:  High risk medication use, Rheumatoid arthritis involving multiple sites with positive rheumatoid factor (H)      Dose:  4 mg  Take 4 tablets (4 mg) by mouth daily Take 3-4 tablets daily  Quantity:  360 tablet  Refills:  3     gabapentin 100 MG capsule  Commonly known as:  NEURONTIN  Used for:  Lumbar degenerative disc disease      Dose:  100 mg  Take 1 capsule (100 mg) by mouth 4 times daily  Quantity:  120 capsule  Refills:  0     leucovorin 5 MG tablet  Commonly known as:  WELLCOVORIN  Used for:  Adverse effect of methotrexate, initial encounter      Dose:  5 mg  Take 1 tablet (5 mg) by mouth daily Once weekly, take 24h after taking weekly methotrexate dose  Quantity:  4 tablet  Refills:  2     lisinopril 10 MG tablet  Commonly known as:   "PRINIVIL/ZESTRIL  Used for:  Essential hypertension      Dose:  10 mg  Take 1 tablet (10 mg) by mouth daily  Quantity:  90 tablet  Refills:  3     methotrexate 50 MG/2ML injection CHEMO  Used for:  Rheumatoid arthritis involving multiple sites with positive rheumatoid factor (H)      Dose:  20 mg  Inject 0.8 mLs (20 mg) Subcutaneous every 7 days Inject 0.8 mL (20 mg) weekly  Quantity:  4 mL  Refills:  2     * methylPREDNISolone 4 MG tablet therapy pack  Commonly known as:  MEDROL DOSEPAK  Used for:  SANTOS (dyspnea on exertion)      Follow package instructions  Quantity:  21 tablet  Refills:  0     * methylPREDNISolone 4 MG tablet therapy pack  Commonly known as:  MEDROL DOSEPAK  Used for:  Lumbar degenerative disc disease      Follow package instructions  Quantity:  21 tablet  Refills:  0     * methylPREDNISolone 4 MG tablet therapy pack  Commonly known as:  MEDROL DOSEPAK  Used for:  Lumbar disc herniation with radiculopathy      Follow Package Directions  Quantity:  21 tablet  Refills:  0         * This list has 5 medication(s) that are the same as other medications prescribed for you. Read the directions carefully, and ask your doctor or other care provider to review them with you.            CONTINUE these medicines which have NOT CHANGED       Dose / Directions   insulin syringe-needle U-100 30G X 1/2\" 1 ML miscellaneous  Commonly known as:  BD insulin syringe ultrafine  Used for:  Rheumatoid arthritis involving multiple sites with positive rheumatoid factor (H)      For methotrexate use weekly  Quantity:  8 each  Refills:  prn              Protect others around you: Learn how to safely use, store and throw away your medicines at www.disposemymeds.org.       Follow-ups after your visit       Your next 10 appointments already scheduled    Jan 31, 2019 10:00 AM CST  (Arrive by 9:15 AM)  XR LUMBAR EPIDURAL INJECTION with SHXR4, SH MSK RAD  [facility] ([facility]) [address] [place] [address] " [facility] [id number]  [phone]   How do I prepare for my exam? (Food and drink instructions) No Food and Drink Restrictions.  How do I prepare for my exam? (Other instructions) * If you take Coumadin (or any other blood thinners) you may need to stop taking them up to 5 days prior to the exam. Talk to your doctor before stopping. * If you take Plavix, Ticlid, Pletal or Persantine, please ask your doctor if you should stop these medicines. You may need extra tests on the morning of your scan. * If you take Xarelto (Rivaroxaban), you may need to stop taking it 24 hours before treatment. Talk to your doctor before stopping this medicine.  What should I wear: Wear comfortable clothes.  How long does the exam take: Injections take about 30 to 45 minutes. Most people spend up to 2 hours in the clinic or hospital.  What should I bring: Please bring any scans or X-rays taken at other hospitals, if similar tests were done. Also bring a list of your medicines, including vitamins, minerals and over-the-counter drugs. It is safest to leave personal items at home. You will need a  : Plan to have someone drive you home afterward.  What do I need to tell my doctor: Tell your doctor in advance: * If you are allergic to X-ray dye (contrast fluid). * If you may be pregnant.  What should I do after the exam: You may have slight cramping during this exam. The cramps should go away afterward. You may have some spotting after the exam.  What is this test: A spinal shot is done in or near the spine. You may receive steroid medicine (to reduce swelling) or contrast fluid (dye that makes the area show up more clearly on X-rays). A nerve root shot is a shot into the nerve near the spine. It may reduce inflammation near the nerve root or spinal cord and reduce pain in the arm or leg.  Who should I call with questions: If you have any questions, please call the Imaging Department where you will have your exam. Directions,  parking instructions, and other information is available on our website, [email or web address].   Feb 06, 2019  7:00 AM CST  (Arrive by 6:45 AM)  Return Visit with Everett Austin MD  [facility] ([facility]) [address] [place] [address]  [address]  [facility] [id number] [phone]   Feb 14, 2019  7:00 AM CST  Return Visit with Tashi Saxena MD  [facility] ([facility]) [address] [place] [id number] [phone]      Care Instructions       Further instructions from your care team       Steroid Injection Discharge Instructions     After you go home:      You may resume your normal diet.    Care of Puncture Site:      If you have a bandaid on your puncture site, you may remove it the next morning    You may shower tomorrow    No bath tubs, whirlpools or swimming for at least 3 days     Activity:      You may go back to normal activity in 24 hours    You should let pain be your guide as to the extent of your activities    Maintain any activity limitations as ordered by your provider    Do NOT drive a vehicle until tomorrow    Medicines:      You may resume all medications    Resume your Warfarin/Coumadin at your regular dose today. Follow up with your provider to have your INR rechecked    Resume your Platelet Inhibitors and Aspirin tomorrow at your regular dose    For minor pain, you may take Acetaminophen (Tylenol) or Ibuprofen (Advil)    Pain:       You may experience increased or different pain over the next 24-48 hours    For the next 48 hrs - you may use ice packs for discomfort     Call your primary care doctor if:      You have severe pain that does not improve with pain medication    You have chills or a fever greater than 101 F (38 C)    The site is red, swollen, hot or tender    New problems with your bowel or bladder    Any questions or concerns    Other Instructions:      New numbness down your leg post  injection is temporary and may last for up to 6 hours. You may need assistance with activity until your leg has normal sensation.    If you are diabetic, monitor your blood sugar closely. Contact the provider who manages your diabetes to help you control your blood sugar if needed.    For Your Information:      A steroid was injected to help decrease swelling and may help to reduce pain. It may take up to 7-10 days to obtain full results.    Some patients will get lasting relief from a single injection. Others may require up to 3 injections to get results. If you have more than one steroid injection, they should be given 2 weeks apart.    Side effects of your steroid injection are mild and will go away in 2-3 days  - Insomnia  - Heartburn  - Flushed face  - Water retention  - Increased appetite  - Increased blood sugar      If you have questions call:        [facility] Radiology Dept @ [phone]      The provider who performed your procedure was _Dr. Pelletier________________.        Additional Information About Your Visit       [employer] Information    [employer] gives you secure access to your electronic health record. If you see a primary care provider, you can also send messages to your care team and make appointments. If you have questions, please call your primary care clinic.  If you do not have a primary care provider, please call [phone] and they will assist you.       Care EveryWhere ID    This is your Care EveryWhere ID. This could be used by other organizations to access your [facility] medical records  [id number]        Primary Care Provider Office Phone # Fax #    Anjel Busby [phone]      Equal Access to Services    [facility] : Dutch Wilkinson, wakodak luqgeremias, qaybta kaalandreas bear. So [facility] [phone].    ATENCIÓN: Si habla español, tiene a samano disposición servicios gratuitos de asistencia  "tyeshafedericaKarla Patel al [phone].    We comply with applicable federal civil rights laws and [place] laws. We do not discriminate on the basis of race, color, national origin, age, disability, sex, sexual orientation, or gender identity.           Thank you!    Thank you for choosing [facility] for your care. Our goal is always to provide you with excellent care. Hearing back from our patients is one way we can continue to improve our services. Please take a few minutes to complete the written survey that you may receive in the mail after you visit with us. Thank you!            Medication List      Medications          Morning Afternoon Evening Bedtime As Needed    insulin syringe-needle U-100 30G X 1/2\" 1 ML miscellaneous  Also known as:  BD insulin syringe ultrafine  INSTRUCTIONS:  For methotrexate use weekly                       ASK your doctor about these medications          Morning Afternoon Evening Bedtime As Needed    * albuterol (2.5 MG/3ML) 0.083% neb solution  Also known as:  PROVENTIL  INSTRUCTIONS:  Take 1 vial (2.5 mg) by nebulization once for 1 dose                     * albuterol 108 (90 Base) MCG/ACT inhaler  Also known as:  PROAIR HFA/PROVENTIL HFA/VENTOLIN HFA  INSTRUCTIONS:  Inhale 2 puffs into the lungs every 6 hours as needed for shortness of breath / dyspnea or wheezing                     DULoxetine 60 MG capsule  Also known as:  CYMBALTA  INSTRUCTIONS:  TK ONE C PO  QAM                     fluocinonide 0.05 % external ointment  Also known as:  LIDEX  INSTRUCTIONS:  Apply topically daily To hands and feet when dermatitis active                     folic acid 1 MG tablet  Also known as:  FOLVITE  INSTRUCTIONS:  Take 4 tablets (4 mg) by mouth daily Take 3-4 tablets daily                     gabapentin 100 MG capsule  Also known as:  NEURONTIN  INSTRUCTIONS:  Take 1 capsule (100 mg) by mouth 4 times daily                     leucovorin 5 MG tablet  Also known as:  WELLCOVORIN  INSTRUCTIONS:  " Take 1 tablet (5 mg) by mouth daily Once weekly, take 24h after taking weekly methotrexate dose                     lisinopril 10 MG tablet  Also known as:  PRINIVIL/ZESTRIL  INSTRUCTIONS:  Take 1 tablet (10 mg) by mouth daily                     methotrexate 50 MG/2ML injection CHEMO  INSTRUCTIONS:  Inject 0.8 mLs (20 mg) Subcutaneous every 7 days Inject 0.8 mL (20 mg) weekly                     * methylPREDNISolone 4 MG tablet therapy pack  Also known as:  MEDROL DOSEPAK  INSTRUCTIONS:  Follow package instructions                     * methylPREDNISolone 4 MG tablet therapy pack  Also known as:  MEDROL DOSEPAK  INSTRUCTIONS:  Follow package instructions                     * methylPREDNISolone 4 MG tablet therapy pack  Also known as:  MEDROL DOSEPAK  INSTRUCTIONS:  Follow Package Directions                        * This list has 5 medication(s) that are the same as other medications prescribed for you. Read the directions carefully, and ask your doctor or other care provider to review them with you.

## 2019-01-31 NOTE — IP AVS SNAPSHOT
[place] [address] [place] [address]  Phone:  [phone]                                    After Visit Summary   1/31/2019    Shira Rodriguez    MRN: [id number]           After Visit Summary Signature Page    I have received my discharge instructions, and my questions have been answered. I have discussed any challenges I see with this plan with the nurse or doctor.    ..........................................................................................................................................  Patient/Patient Representative Signature      ..........................................................................................................................................  Patient Representative Print Name and Relationship to Patient    ..................................................               ................................................  Date                                   Time    ..........................................................................................................................................  Reviewed by Signature/Title    ...................................................              ..............................................  Date                                               Time          22EPIC Rev 08/18

## 2019-01-31 NOTE — DISCHARGE INSTRUCTIONS
Steroid Injection Discharge Instructions     After you go home:      You may resume your normal diet.    Care of Puncture Site:      If you have a bandaid on your puncture site, you may remove it the next morning    You may shower tomorrow    No bath tubs, whirlpools or swimming for at least 3 days     Activity:      You may go back to normal activity in 24 hours    You should let pain be your guide as to the extent of your activities    Maintain any activity limitations as ordered by your provider    Do NOT drive a vehicle until tomorrow    Medicines:      You may resume all medications    Resume your Warfarin/Coumadin at your regular dose today. Follow up with your provider to have your INR rechecked    Resume your Platelet Inhibitors and Aspirin tomorrow at your regular dose    For minor pain, you may take Acetaminophen (Tylenol) or Ibuprofen (Advil)    Pain:       You may experience increased or different pain over the next 24-48 hours    For the next 48 hrs - you may use ice packs for discomfort     Call your primary care doctor if:      You have severe pain that does not improve with pain medication    You have chills or a fever greater than 101 F (38 C)    The site is red, swollen, hot or tender    New problems with your bowel or bladder    Any questions or concerns    Other Instructions:      New numbness down your leg post injection is temporary and may last for up to 6 hours. You may need assistance with activity until your leg has normal sensation.    If you are diabetic, monitor your blood sugar closely. Contact the provider who manages your diabetes to help you control your blood sugar if needed.    For Your Information:      A steroid was injected to help decrease swelling and may help to reduce pain. It may take up to 7-10 days to obtain full results.    Some patients will get lasting relief from a single injection. Others may require up to 3 injections to get results. If you have more than one  steroid injection, they should be given 2 weeks apart.    Side effects of your steroid injection are mild and will go away in 2-3 days  - Insomnia  - Heartburn  - Flushed face  - Water retention  - Increased appetite  - Increased blood sugar      If you have questions call:        [facility] Radiology Dept @ [phone]      The provider who performed your procedure was _Dr. Pelletier________________.

## 2019-02-14 ENCOUNTER — OFFICE VISIT (OUTPATIENT)
Dept: ORTHOPEDICS | Facility: CLINIC | Age: 59
End: 2019-02-14
Payer: COMMERCIAL

## 2019-02-14 VITALS — DIASTOLIC BLOOD PRESSURE: 95 MMHG | HEART RATE: 103 BPM | SYSTOLIC BLOOD PRESSURE: 174 MMHG

## 2019-02-14 DIAGNOSIS — M05.79 RHEUMATOID ARTHRITIS INVOLVING MULTIPLE SITES WITH POSITIVE RHEUMATOID FACTOR (H): ICD-10-CM

## 2019-02-14 DIAGNOSIS — M54.42 CHRONIC BILATERAL LOW BACK PAIN WITH BILATERAL SCIATICA: ICD-10-CM

## 2019-02-14 DIAGNOSIS — M54.16 LUMBAR RADICULAR PAIN: Primary | ICD-10-CM

## 2019-02-14 DIAGNOSIS — T45.1X5A ADVERSE EFFECT OF METHOTREXATE, INITIAL ENCOUNTER: ICD-10-CM

## 2019-02-14 DIAGNOSIS — M54.41 CHRONIC BILATERAL LOW BACK PAIN WITH BILATERAL SCIATICA: ICD-10-CM

## 2019-02-14 DIAGNOSIS — G89.29 CHRONIC BILATERAL LOW BACK PAIN WITH BILATERAL SCIATICA: ICD-10-CM

## 2019-02-14 LAB
ALBUMIN SERPL-MCNC: 3.3 G/DL (ref 3.4–5)
ALT SERPL W P-5'-P-CCNC: 29 U/L (ref 0–50)
AST SERPL W P-5'-P-CCNC: 12 U/L (ref 0–45)
BASOPHILS # BLD AUTO: 0 10E9/L (ref 0–0.2)
BASOPHILS NFR BLD AUTO: 0.4 %
CREAT SERPL-MCNC: 0.6 MG/DL (ref 0.52–1.04)
DIFFERENTIAL METHOD BLD: ABNORMAL
EOSINOPHIL # BLD AUTO: 0.2 10E9/L (ref 0–0.7)
EOSINOPHIL NFR BLD AUTO: 2.6 %
ERYTHROCYTE [DISTWIDTH] IN BLOOD BY AUTOMATED COUNT: 13.4 % (ref 10–15)
GFR SERPL CREATININE-BSD FRML MDRD: >90 ML/MIN/{1.73_M2}
HCT VFR BLD AUTO: 48.7 % (ref 35–47)
HGB BLD-MCNC: 15.9 G/DL (ref 11.7–15.7)
IMM GRANULOCYTES # BLD: 0 10E9/L (ref 0–0.4)
IMM GRANULOCYTES NFR BLD: 0.4 %
LYMPHOCYTES # BLD AUTO: 1.8 10E9/L (ref 0.8–5.3)
LYMPHOCYTES NFR BLD AUTO: 19.6 %
MCH RBC QN AUTO: 30.9 PG (ref 26.5–33)
MCHC RBC AUTO-ENTMCNC: 32.6 G/DL (ref 31.5–36.5)
MCV RBC AUTO: 95 FL (ref 78–100)
MONOCYTES # BLD AUTO: 0.6 10E9/L (ref 0–1.3)
MONOCYTES NFR BLD AUTO: 7 %
NEUTROPHILS # BLD AUTO: 6.2 10E9/L (ref 1.6–8.3)
NEUTROPHILS NFR BLD AUTO: 70 %
PLATELET # BLD AUTO: 307 10E9/L (ref 150–450)
RBC # BLD AUTO: 5.14 10E12/L (ref 3.8–5.2)
WBC # BLD AUTO: 8.9 10E9/L (ref 4–11)

## 2019-02-14 PROCEDURE — 84460 ALANINE AMINO (ALT) (SGPT): CPT | Performed by: INTERNAL MEDICINE

## 2019-02-14 PROCEDURE — 99213 OFFICE O/P EST LOW 20 MIN: CPT | Performed by: PREVENTIVE MEDICINE

## 2019-02-14 PROCEDURE — 85025 COMPLETE CBC W/AUTO DIFF WBC: CPT | Performed by: INTERNAL MEDICINE

## 2019-02-14 PROCEDURE — 84450 TRANSFERASE (AST) (SGOT): CPT | Performed by: INTERNAL MEDICINE

## 2019-02-14 PROCEDURE — 82565 ASSAY OF CREATININE: CPT | Performed by: INTERNAL MEDICINE

## 2019-02-14 PROCEDURE — 36415 COLL VENOUS BLD VENIPUNCTURE: CPT | Performed by: INTERNAL MEDICINE

## 2019-02-14 PROCEDURE — 82040 ASSAY OF SERUM ALBUMIN: CPT | Performed by: INTERNAL MEDICINE

## 2019-02-14 ASSESSMENT — PAIN SCALES - GENERAL: PAINLEVEL: MODERATE PAIN (5)

## 2019-02-14 NOTE — PROGRESS NOTES
"HISTORY OF PRESENT ILLNESS  Ms. Rodriguez is a pleasant 58 year old year old female who presents to clinic today for followup for lumbar injection  She overall feels much better   Her achilles and legs felt better after the injection for about a week  Still feels better, but pain is recurring    MEDICAL HISTORY  Patient Active Problem List   Diagnosis     Primary localized osteoarthrosis, pelvic region and thigh     Adjustment disorder with depressed mood     Tobacco use disorder     Morbid obesity (H)     Arthritis of knee, degenerative     Degeneration of cervical intervertebral disc     CYRUS (obstructive sleep apnea)     Elevated blood pressure (not hypertension)       Current Outpatient Medications   Medication Sig Dispense Refill     albuterol (PROAIR HFA/PROVENTIL HFA/VENTOLIN HFA) 108 (90 BASE) MCG/ACT Inhaler Inhale 2 puffs into the lungs every 6 hours as needed for shortness of breath / dyspnea or wheezing 1 Inhaler 0     DULoxetine (CYMBALTA) 60 MG EC capsule TK ONE C PO  QAM  0     fluocinonide (LIDEX) 0.05 % ointment Apply topically daily To hands and feet when dermatitis active 60 g 2     folic acid (FOLVITE) 1 MG tablet Take 4 tablets (4 mg) by mouth daily Take 3-4 tablets daily 360 tablet 3     gabapentin (NEURONTIN) 100 MG capsule Take 1 capsule (100 mg) by mouth 4 times daily 120 capsule 0     insulin syringe-needle U-100 (BD INSULIN SYRINGE ULTRAFINE) 30G X 1/2\" 1 ML For methotrexate use weekly 8 each prn     leucovorin (WELLCOVORIN) 5 MG tablet Take 1 tablet (5 mg) by mouth daily Once weekly, take 24h after taking weekly methotrexate dose 4 tablet 2     lisinopril (PRINIVIL/ZESTRIL) 10 MG tablet Take 1 tablet (10 mg) by mouth daily 90 tablet 3     methotrexate 50 MG/2ML injection CHEMO Inject 0.8 mLs (20 mg) Subcutaneous every 7 days Inject 0.8 mL (20 mg) weekly 4 mL 2     methylPREDNISolone (MEDROL DOSEPAK) 4 MG tablet therapy pack Follow Package Directions 21 tablet 0     methylPREDNISolone " (MEDROL DOSEPAK) 4 MG tablet Follow package instructions 21 tablet 0     methylPREDNISolone (MEDROL DOSEPAK) 4 MG tablet Follow package instructions 21 tablet 0     albuterol (2.5 MG/3ML) 0.083% neb solution Take 1 vial (2.5 mg) by nebulization once for 1 dose 3 mL 0       Allergies   Allergen Reactions     Adhesive Tape Blisters     Erythromycin Rash       Family History   Problem Relation Age of Onset     Thyroid Disease Mother      Hypertension Mother      Skin Cancer Mother         MMohs surgery with skin graft     Cerebrovascular Disease Mother         CVA after endarderectomy     Diabetes Mother      Breast Cancer Paternal Grandmother      Pancreatic Cancer Paternal Grandmother      Cardiovascular Paternal Aunt      Cardiovascular Paternal Uncle      Depression Other      Alcoholism Father      Thyroid Disease Sister      Alcoholism Sister      Hypertension Maternal Grandmother      Heart Disease Sister         multiple ablations     Alcoholism Sister      Prostate Cancer Paternal Grandfather        Additional medical/Social/Surgical histories reviewed in [facility] and updated as appropriate.     REVIEW OF SYSTEMS (2/14/2019)  10 point ROS of systems including Constitutional, Eyes, Respiratory, Cardiovascular, Gastroenterology, Genitourinary, Integumentary, Musculoskeletal, Psychiatric were all negative except for pertinent positives noted in my HPI.     PHYSICAL EXAM  Vitals:    02/14/19 0712   BP: (!) 174/95   Pulse: 103     Vital Signs: BP (!) 174/95   Pulse 103  Patient declined being weighed. There is no height or weight on file to calculate BMI.    General  - normal appearance, in no obvious distress, overweight  CV  - normal peripheral perfusion  Pulm  - normal respiratory pattern, non-labored  Musculoskeletal - lumbar spine  - stance: normal gait without limp, no obvious leg length discrepancy, normal heel and toe walk  - inspection: normal bone and joint alignment, no obvious scoliosis  - palpation: no  paravertebral or bony tenderness  - ROM: flexion exacerbates pain, normal extension, sidebending, rotation  - strength: lower extremities 5/5 in all planes  - special tests:  (+) straight leg raise  (+) slump test  Neuro  - patellar and Achilles DTRs 2+ bilaterally, bilateral lower extremity sensory deficit throughout L5 distribution, grossly normal coordination, normal muscle tone  Skin  - no ecchymosis, erythema, warmth, or induration, no obvious rash  Psych  - interactive, appropriate, normal mood and affect  ASSESSMENT & PLAN  57 yo female with lumbar ddd, disc herniation, radicular pain  Reviewed her response to injection and will consider another injection in 1 month  Start pool PT asap      Tashi Saxena MD, CAQSM

## 2019-02-14 NOTE — PATIENT INSTRUCTIONS
Thanks for coming today.  Ortho/Sports Medicine Clinic  [address] [place] [address]    To schedule future appointments in Ortho Clinic, you may call [phone].    To schedule ordered imaging by your provider:   Call [facility] [phone]    To schedule an injection ordered by your provider:  Call [facility] Injection scheduling line: [phone]  [email or web address] available online at:  [email or web address]    Please call if any further questions or concerns ([phone]).  Clinic hours 8 am to 5 pm.    Return to clinic (call) if symptoms worsen or fail to improve.

## 2019-02-14 NOTE — LETTER
"    2/14/2019         RE: Shira Rodriguez  [id number] [address] [address]        Dear Colleague,    Thank you for referring your patient, Shira Rodriguez, to the [facility]. Please see a copy of my visit note below.    HISTORY OF PRESENT ILLNESS  Ms. Rodriguez is a pleasant 58 year old year old female who presents to clinic today for followup for lumbar injection  She overall feels much better   Her achilles and legs felt better after the injection for about a week  Still feels better, but pain is recurring    MEDICAL HISTORY  Patient Active Problem List   Diagnosis     Primary localized osteoarthrosis, pelvic region and thigh     Adjustment disorder with depressed mood     Tobacco use disorder     Morbid obesity (H)     Arthritis of knee, degenerative     Degeneration of cervical intervertebral disc     CYRUS (obstructive sleep apnea)     Elevated blood pressure (not hypertension)       Current Outpatient Medications   Medication Sig Dispense Refill     albuterol (PROAIR HFA/PROVENTIL HFA/VENTOLIN HFA) 108 (90 BASE) MCG/ACT Inhaler Inhale 2 puffs into the lungs every 6 hours as needed for shortness of breath / dyspnea or wheezing 1 Inhaler 0     DULoxetine (CYMBALTA) 60 MG EC capsule TK ONE C PO  QAM  0     fluocinonide (LIDEX) 0.05 % ointment Apply topically daily To hands and feet when dermatitis active 60 g 2     folic acid (FOLVITE) 1 MG tablet Take 4 tablets (4 mg) by mouth daily Take 3-4 tablets daily 360 tablet 3     gabapentin (NEURONTIN) 100 MG capsule Take 1 capsule (100 mg) by mouth 4 times daily 120 capsule 0     insulin syringe-needle U-100 (BD INSULIN SYRINGE ULTRAFINE) 30G X 1/2\" 1 ML For methotrexate use weekly 8 each prn     leucovorin (WELLCOVORIN) 5 MG tablet Take 1 tablet (5 mg) by mouth daily Once weekly, take 24h after taking weekly methotrexate dose 4 tablet 2     lisinopril (PRINIVIL/ZESTRIL) 10 MG tablet Take 1 tablet (10 mg) by mouth daily 90 tablet 3 "     methotrexate 50 MG/2ML injection CHEMO Inject 0.8 mLs (20 mg) Subcutaneous every 7 days Inject 0.8 mL (20 mg) weekly 4 mL 2     methylPREDNISolone (MEDROL DOSEPAK) 4 MG tablet therapy pack Follow Package Directions 21 tablet 0     methylPREDNISolone (MEDROL DOSEPAK) 4 MG tablet Follow package instructions 21 tablet 0     methylPREDNISolone (MEDROL DOSEPAK) 4 MG tablet Follow package instructions 21 tablet 0     albuterol (2.5 MG/3ML) 0.083% neb solution Take 1 vial (2.5 mg) by nebulization once for 1 dose 3 mL 0       Allergies   Allergen Reactions     Adhesive Tape Blisters     Erythromycin Rash       Family History   Problem Relation Age of Onset     Thyroid Disease Mother      Hypertension Mother      Skin Cancer Mother         MMohs surgery with skin graft     Cerebrovascular Disease Mother         CVA after endarderectomy     Diabetes Mother      Breast Cancer Paternal Grandmother      Pancreatic Cancer Paternal Grandmother      Cardiovascular Paternal Aunt      Cardiovascular Paternal Uncle      Depression Other      Alcoholism Father      Thyroid Disease Sister      Alcoholism Sister      Hypertension Maternal Grandmother      Heart Disease Sister         multiple ablations     Alcoholism Sister      Prostate Cancer Paternal Grandfather        Additional medical/Social/Surgical histories reviewed in [facility] and updated as appropriate.     REVIEW OF SYSTEMS (2/14/2019)  10 point ROS of systems including Constitutional, Eyes, Respiratory, Cardiovascular, Gastroenterology, Genitourinary, Integumentary, Musculoskeletal, Psychiatric were all negative except for pertinent positives noted in my HPI.     PHYSICAL EXAM  Vitals:    02/14/19 0712   BP: (!) 174/95   Pulse: 103     Vital Signs: BP (!) 174/95   Pulse 103  Patient declined being weighed. There is no height or weight on file to calculate BMI.    General  - normal appearance, in no obvious distress, overweight  CV  - normal peripheral perfusion  Pulm  -  normal respiratory pattern, non-labored  Musculoskeletal - lumbar spine  - stance: normal gait without limp, no obvious leg length discrepancy, normal heel and toe walk  - inspection: normal bone and joint alignment, no obvious scoliosis  - palpation: no paravertebral or bony tenderness  - ROM: flexion exacerbates pain, normal extension, sidebending, rotation  - strength: lower extremities 5/5 in all planes  - special tests:  (+) straight leg raise  (+) slump test  Neuro  - patellar and Achilles DTRs 2+ bilaterally, bilateral lower extremity sensory deficit throughout L5 distribution, grossly normal coordination, normal muscle tone  Skin  - no ecchymosis, erythema, warmth, or induration, no obvious rash  Psych  - interactive, appropriate, normal mood and affect  ASSESSMENT & PLAN  57 yo female with lumbar ddd, disc herniation, radicular pain  Reviewed her response to injection and will consider another injection in 1 month  Start pool PT asap      Tashi Saxena MD, CAQSM    Again, thank you for allowing me to participate in the care of your patient.        Sincerely,        Tashi Saxena MD

## 2019-02-25 NOTE — PROGRESS NOTES
[facility]  Rheumatology Clinic  Everett Austin MD  2019     Name: Shira Rodriguez  MRN: [id number]  Age: 58 year old  : 1960  Referring provider: Anjel Busby     Assessment and Plan:     # Inflammatory Arthritis (+RF/+CCP, dx 2017 with diffuse polyarticular inflammatory flare): Patient relates intermittent monoarticular arthralgia and bilateral Achilles tendon swelling with walking-associated pain. Morning stiffness is negligible. Exam reveals tenderness at the right 2nd PIP with painful flexion. Achilles tendon sheaths are tender and slightly swollen. There is eczematous eruption over the palmar surfaces of both hands, and psoriasiform rash over the dorsal aspect of the right hand. Laboratory values from  showed normal creatinine, liver function, and CBC.    Patient has serologic profile that aligns with classic rheumatoid arthritis, but recent symptoms of asymmetric monoarticular tenosynovitis, Achilles tendon sheath inflammation, and psoriasiform skin eruptions suggest a parallel process of seronegative spondyloarthropathy. Regardless of the label, I think there is low-grade activity of inflammatory arthropathy. I recommend increasing methotrexate to 25mg weekly by subcutaneous injection. To alleviate mucositis, I recommend continuing folic acid 4mg daily and leucovorin 5 mg 24 hours after each methotrexate dose. I will ask for Dermatology opinion regarding adjunct topical therapy for skin eruptions chronically affecting both hands.     # Hypertension: Patient is following with cardiology.    # DDD, lumbar spine: agree with MEAGHAN injections provided through Dr. Saxena.    Plan:  - methotrexate 50 MG/2ML injection CHEMO  Dispense: 4 mL; Refill: 4  - Creatinine  - ALT  - AST  - CBC with platelets  - DERMATOLOGY REFERRAL    Follow-up: Return in about 4 months (around 2019).     HPI:   Shira Rodriguez has a history of rheumatoid arthritis and presents for follow up. She was  last seen on 10/3/18, at which time she continued on methotrexate 0.8 mL or 20 mg/week. She had previously been prescribed Humira but never started this.    Today, the patient reports having episodic pain in various joints on left hand for the past few weeks. Most recently, her left 4th PIP was transiently painful for the past week. She notes having left wrist soreness around a month ago, and current occasional right hand numbness in the mornings. She mentions previously receiving carpal tunnel injections that were helpful, and that she has restarted wearing volar wrist splits at night. She continues to have back pain, for which she sees orthopedics. She states that epidural steroid injections were extremely effective, removing all back pain, but short-lived, lasting only 4 days. She is currently seeking pool therapy. Her biggest issue is her achilles pain L>R, which is worst when walking. She mentions seeing a podiatrist and being recommended a boot for her ankle pain, but that the boot made things worse, attributed to her osteoarthritis.     She denies joint pain in the morning, but notes that her wrists feel stiff when she wakes up. She denies morning foot soreness. She continues to have pain along the side of her tongue, which is helped by leucovorin. She currently takes 4 tablets of folic acid daily, and 1 tablet of leucovorin after her weekly methotrexate dose of 0.8mL. She mentions having some difficulty with obtaining methotrexate with preservatives, causing her to be unable to use her full prescription before it expires. She occasionally takes aleve.    Patient was seen by Dr. Saxena on February 14th, 2019 in follow up of lumbar radicular pain. Assessment was of lumbar degenerative disc disease, herniation, and radicular pain, improved following epidural steroid injection in January 2019. Procedure on January 31st, 2019 was left L4-5 epidural steroid injection.    Rheumatological history:  Patient is a  58 year old female who was originally referred to rheumatology in 2/2017 by her PCP Dr. Busby for 3 months of BL hand pain thought consistent with CTS and positive RF/CCP. EMG showed moderate median neuropathy on L and severe on R, MRI c-spine normal. She received R CT injection by sports med with 9 months of relief. She did describe intermittent episodes of swelling of her feet/ankles around this time period as well that resolved with medrol dose pack. She has a hx of L TKA and R TAWNY. She has chronic numbness of anterior BL shins following L TKA (was told they may have injected the nerve with marcaine around surgery years ago and numbness in R shin started in 2015 following a fall where developed large hematoma). When she was evaluated in 2/2017 she had no inflammatory arthritis symptoms and did not meet diagnosis of RA. We discussed starting plaquenil given it's role in potentially preventing pts with +RF/CCP from developing clinical RA, although she did not want to do this at the time. She was re-evaluated 8/2017 after developing polyarticular inflammatory arthritis of the right knee, ankles, wrists, hands, and shoulders. She was started on MTX, given depomedrol IM injection, oral prednisone, and given R knee CS injection. MTX increased and oral prednisone tapered to off in 9/2017. Had repeat R CT injection in 10/2017 that lasted until 1/2018. R knee significantly improved following CS injection 12/2017. Has persistent pain in L heel with enthesopathy on imaging, unclear if had true enthesitis in this region due to her RA. Participated in pool therapy winter 2018 with good response. At her 3/2018 OV she continued to have significant pain in her R wrist thought due to CT, but given some ongoing persistent EMS (BL hand stiffness lasting ALL day) as well and her known seropositive disease I recommended adding Humira to her regimen. This was ordered but she never started.     Interval History 10/3/18:  The patient  "was doing well overall but had occasional flares of joint pain. She noted up to an hour of early morning stiffness. She had multiple bouts of tongue pain attributed to methotrexate. She complained of back and bilateral achilles pain, for which she planned to follow up with orthopedics and podiatry. She also planned to follow up with cardiology regarding her recent echocardiogram.     Review of Systems:   Pertinent items are noted in HPI or as below, remainder of complete ROS is negative.      No recent problems with hearing or vision. No swallowing problems.   No breathing difficulty, shortness of breath, coughing, or wheezing  No chest pain or palpitations  No heart burn, indigestion, abdominal pain, nausea, vomiting, diarrhea  No urination problems, no bloody, cloudy urine, no dysuria  No numbing, tingling, weakness  No headaches or confusion  No rashes. No easy bleeding or bruising.     Active Medications:     Current Outpatient Medications:      DULoxetine (CYMBALTA) 60 MG EC capsule, TK ONE C PO  QAM, Disp: , Rfl: 0     folic acid (FOLVITE) 1 MG tablet, Take 4 tablets (4 mg) by mouth daily Take 3-4 tablets daily, Disp: 360 tablet, Rfl: 3     insulin syringe-needle U-100 (BD INSULIN SYRINGE ULTRAFINE) 30G X 1/2\" 1 ML, For methotrexate use weekly, Disp: 8 each, Rfl: prn     leucovorin (WELLCOVORIN) 5 MG tablet, Take 1 tablet (5 mg) by mouth daily Once weekly, take 24h after taking weekly methotrexate dose, Disp: 4 tablet, Rfl: 2     lisinopril (PRINIVIL/ZESTRIL) 10 MG tablet, Take 1 tablet (10 mg) by mouth daily, Disp: 90 tablet, Rfl: 3     methotrexate 50 MG/2ML injection CHEMO, Inject 1 mL (25 mg) Subcutaneous every 7 days Inject 0.8 mL (20 mg) weekly, Disp: 4 mL, Rfl: 4     methylPREDNISolone (MEDROL DOSEPAK) 4 MG tablet therapy pack, Follow Package Directions, Disp: 21 tablet, Rfl: 0     methylPREDNISolone (MEDROL DOSEPAK) 4 MG tablet, Follow package instructions, Disp: 21 tablet, Rfl: 0     " methylPREDNISolone (MEDROL DOSEPAK) 4 MG tablet, Follow package instructions, Disp: 21 tablet, Rfl: 0     tiZANidine (ZANAFLEX) 4 MG tablet, Take 1-2 tablets (4-8 mg) by mouth nightly as needed, Disp: 30 tablet, Rfl: 1     albuterol (2.5 MG/3ML) 0.083% neb solution, Take 1 vial (2.5 mg) by nebulization once for 1 dose, Disp: 3 mL, Rfl: 0     albuterol (PROAIR HFA/PROVENTIL HFA/VENTOLIN HFA) 108 (90 BASE) MCG/ACT Inhaler, Inhale 2 puffs into the lungs every 6 hours as needed for shortness of breath / dyspnea or wheezing (Patient not taking: Reported on 2/27/2019), Disp: 1 Inhaler, Rfl: 0     fluocinonide (LIDEX) 0.05 % ointment, Apply topically daily To hands and feet when dermatitis active (Patient not taking: Reported on 2/27/2019), Disp: 60 g, Rfl: 2     gabapentin (NEURONTIN) 100 MG capsule, Take 1 capsule (100 mg) by mouth 4 times daily (Patient not taking: Reported on 2/27/2019), Disp: 120 capsule, Rfl: 0    Current Facility-Administered Medications:      lidocaine (PF) (XYLOCAINE) 1 % injection 50 mg, 5 mL, Other, Once, Rosita Chandra MD     methylPREDNISolone acetate (DEPO-MEDROL) injection 40 mg, 40 mg, INTRA-ARTICULAR, Once, Rosita Chandra MD      Allergies:   Adhesive tape and Erythromycin      Past Medical History:  Depression   Eczema   Hyperlipidemia   Hypertension   Morbid obesity   Osteoarthrosis right knee   Sleep apnea   Tobacco use disorder   Primary localized osteoarthrosis, pelvic region and thigh  Degeneration of cervical intervertebral disc     Past Surgical History:  Left total knee arthoplasty 6/14/2012   Right hip arthroplasty 07/09/2004     Family History:   Mother: thyroid disease, hypertension, skin cancer, CVA  Paternal grandmother: breast cancer, pancreatic cancer   Father: alcoholism   Sister: thyroid disease, alcoholism   Maternal grandmother: hypertension   Sister: heart disease, multiple ablations   Paternal grandfather: prostate cancer      Social History:   Current  "everyday cigarette smoker, 1 pack/day, 33 years, started 1982  No smokeless tobacco use  Occasional alcohol use     Physical Exam:   /90   Pulse 106   Temp 98.4  F (36.9  C) (Oral)   Ht 1.676 m (5' 6\")   Wt (!) 193.6 kg (426 lb 12.8 oz)   SpO2 94%   BMI 68.89 kg/m     Wt Readings from Last 4 Encounters:   02/27/19 (!) 193.6 kg (426 lb 12.8 oz)   10/03/18 (!) 186 kg (410 lb 1.6 oz)   06/27/18 (!) 191.9 kg (423 lb 1.6 oz)   06/04/18 (!) 189.7 kg (418 lb 4.8 oz)     Constitutional: Well-developed, appearing stated age; cooperative  Eyes: Normal EOM, PERRLA, vision, conjunctiva, sclera  ENT: Normal external ears, nose, hearing, lips, teeth, gums, throat. No mucous membrane lesions, normal saliva pool  Neck: No mass or thyroid enlargement  Resp: Lungs clear to auscultation, nl to palpation  CV: RRR, no murmurs, rubs or gallops, no edema  GI: No ABD mass or tenderness, no HSM  : Not tested  Lymph: No cervical, supraclavicular, inguinal or epitrochlear nodes  MS: Tenderness at right 2nd PIP. Fist formation is good but causes discomfort. Pain elicited with full flexion of the right shoulder, mild impingement. Tenderness at bilateral achilles tendons.The TMJ, neck, elbow, wrist, MCP/DIP, spine, hip, knee, and foot MTP/IP joints were examined and found normal. No active synovitis or altered joint anatomy. Full joint ROM. Normal  strength. No dactylitis,  tenosynovitis, enthesopathy.  Skin: Psoriasis over dorsal aspect of the right wrist. Palmar aspect of both hands shows scaling erythematous patches and macules in the palms and flexures of both hands. No nail pitting, alopecia, nodules or lesions  Neuro: Normal cranial nerves, strength, sensation, DTRs.   Psych: Normal judgement, orientation, memory, affect.     Laboratory:   RHEUM RESULTS Latest Ref Rng & Units 3/5/2018 6/4/2018 2/14/2019   SED RATE 0 - 30 mm/h 11 - -   CRP, INFLAMMATION 0.0 - 8.0 mg/L 7.1 - -   RHEUMATOID FACTOR <20 IU/mL - - -   EMERY " SCREEN BY EIA <1.0 - - -   AST 0 - 45 U/L 18 22 12   ALT 0 - 50 U/L 28 40 29   ALBUMIN 3.4 - 5.0 g/dL 3.3(L) 3.3(L) 3.3(L)   WBC 4.0 - 11.0 10e9/L 7.4 7.5 8.9   RBC 3.8 - 5.2 10e12/L 5.06 4.97 5.14   HGB 11.7 - 15.7 g/dL 16.1(H) 15.8(H) 15.9(H)   HCT 35.0 - 47.0 % 48.8(H) 47.5(H) 48.7(H)   MCV 78 - 100 fl 96 96 95   MCHC 31.5 - 36.5 g/dL 33.0 33.3 32.6   RDW 10.0 - 15.0 % 13.9 13.7 13.4    - 450 10e9/L 235 214 307   CREATININE 0.52 - 1.04 mg/dL 0.52 0.56 0.60   GFR ESTIMATE, IF BLACK >60 mL/min/[1.73:m2] >90 >90 >90   GFR ESTIMATE >60 mL/min/[1.73:m2] >90 >90 >90    - 1,620 mg/dL - - -   IGA 70 - 380 mg/dL - - -   IGM 60 - 265 mg/dL - - -   HEPATITIS C ANTIBODY NR - - -       Rheumatoid Factor   Date Value Ref Range Status   08/28/2017 51 (H) <20 IU/mL Final   ,   Cyclic Citrullinated Peptide Antibody, IgG   Date Value Ref Range Status   08/28/2017 15 (H) <7 U/mL Final     Comment:     Positive     EMERY Screen by EIA   Date Value Ref Range Status   12/30/2016 <1.0  Interpretation:  Negative   <1.0 Final     Hepatitis B Core Caron   Date Value Ref Range Status   04/12/2017 Nonreactive NR Final   ,   Hep B Surface Agn   Date Value Ref Range Status   04/12/2017 Nonreactive NR Final     IGG   Date Value Ref Range Status   04/21/2017 940 695 - 1,620 mg/dL Final     IGA   Date Value Ref Range Status   04/21/2017 271 70 - 380 mg/dL Final     IGM   Date Value Ref Range Status   04/21/2017 395 (H) 60 - 265 mg/dL Final     Scribe Disclosure:   I, Artie Magana, am serving as a scribe to document services personally performed by Everett Austin MD at this visit, based upon the provider's statements to me. All documentation has been reviewed by the aforementioned provider prior to being entered into the official medical record.     Portions of this medical record were completed by a scribe. UPON MY REVIEW AND AUTHENTICATION BY ELECTRONIC SIGNATURE, this confirms (a) I performed the applicable clinical services, and (b)  the record is accurate.

## 2019-02-27 ENCOUNTER — TELEPHONE (OUTPATIENT)
Dept: PHARMACY | Facility: CLINIC | Age: 59
End: 2019-02-27

## 2019-02-27 ENCOUNTER — OFFICE VISIT (OUTPATIENT)
Dept: RHEUMATOLOGY | Facility: CLINIC | Age: 59
End: 2019-02-27
Attending: INTERNAL MEDICINE
Payer: COMMERCIAL

## 2019-02-27 VITALS
BODY MASS INDEX: 47.09 KG/M2 | HEIGHT: 66 IN | WEIGHT: 293 LBS | SYSTOLIC BLOOD PRESSURE: 163 MMHG | OXYGEN SATURATION: 94 % | HEART RATE: 106 BPM | TEMPERATURE: 98.4 F | DIASTOLIC BLOOD PRESSURE: 90 MMHG

## 2019-02-27 DIAGNOSIS — R21 RASH/SKIN ERUPTION: ICD-10-CM

## 2019-02-27 DIAGNOSIS — M05.741 RHEUMATOID ARTHRITIS INVOLVING BOTH HANDS WITH POSITIVE RHEUMATOID FACTOR (H): Primary | ICD-10-CM

## 2019-02-27 DIAGNOSIS — M05.742 RHEUMATOID ARTHRITIS INVOLVING BOTH HANDS WITH POSITIVE RHEUMATOID FACTOR (H): Primary | ICD-10-CM

## 2019-02-27 DIAGNOSIS — M05.79 RHEUMATOID ARTHRITIS INVOLVING MULTIPLE SITES WITH POSITIVE RHEUMATOID FACTOR (H): ICD-10-CM

## 2019-02-27 PROCEDURE — G0463 HOSPITAL OUTPT CLINIC VISIT: HCPCS | Mod: ZF

## 2019-02-27 RX ORDER — METHOTREXATE 25 MG/ML
25 INJECTION, SOLUTION INTRA-ARTERIAL; INTRAMUSCULAR; INTRAVENOUS
Qty: 4 ML | Refills: 4 | Status: SHIPPED | OUTPATIENT
Start: 2019-02-27 | End: 2019-06-26

## 2019-02-27 ASSESSMENT — MIFFLIN-ST. JEOR: SCORE: 2537.7

## 2019-02-27 ASSESSMENT — PAIN SCALES - GENERAL: PAINLEVEL: SEVERE PAIN (6)

## 2019-02-27 NOTE — LETTER
2019       RE: Shira Rodriguez  [id number] [address] [address]     Dear Colleague,    Thank you for referring your patient, Shira Rodriguez, to the [facility] RHEUMATOLOGY at [facility]. Please see a copy of my visit note below.    [facility]  Rheumatology Clinic  Everett Austin MD  2019     Name: Shira Rodriguez  MRN: [id number]  Age: 58 year old  : 1960  Referring provider: Anjel Busby     Assessment and Plan:     #  Inflammatory Arthritis (+RF/+CCP, dx 2017 with diffuse polyarticular inflammatory flare): Patient relates intermittent monoarticular arthralgia and bilateral Achilles tendon swelling with walking-associated pain. Morning stiffness is negligible. Exam reveals tenderness at the right 2nd PIP with painful flexion. Achilles tendon sheaths are tender and slightly swollen. There is eczematous eruption over the palmar surfaces of both hands, and psoriasiform rash over the dorsal aspect of the right hand. Laboratory values from  showed normal creatinine, liver function, and CBC.    Patient has serologic profile that aligns with classic rheumatoid arthritis, but recent symptoms of asymmetric monoarticular tenosynovitis, Achilles tendon sheath inflammation, and psoriasiform skin eruptions suggest a parallel process of seronegative spondyloarthropathy. Regardless of the label, I think there is low-grade activity of inflammatory arthropathy. I recommend increasing methotrexate to 25mg weekly by subcutaneous injection. To alleviate mucositis, I recommend continuing folic acid 4mg daily and leucovorin 5 mg 24 hours after each methotrexate dose. I will ask for Dermatology opinion regarding adjunct topical therapy for skin eruptions chronically affecting both hands.     # Hypertension: Patient is following with cardiology.    # DDD, lumbar spine: agree with MEAGHAN injections provided through Dr. Saxena.    Plan:  -  methotrexate 50 MG/2ML injection CHEMO  Dispense: 4 mL; Refill: 4  - Creatinine  - ALT  - AST  - CBC with platelets  - DERMATOLOGY REFERRAL    Follow-up: Return in about 4 months (around 6/27/2019).     HPI:   Shira Rodriguez has a history of rheumatoid arthritis and presents for follow up. She was last seen on 10/3/18, at which time she continued on methotrexate 0.8 mL or 20 mg/week. She had previously been prescribed Humira but never started this.    Today, the patient reports having episodic pain in various joints on left hand for the past few weeks. Most recently, her left 4th PIP was transiently painful for the past week. She notes having left wrist soreness around a month ago, and current occasional right hand numbness in the mornings. She mentions previously receiving carpal tunnel injections that were helpful, and that she has restarted wearing volar wrist splits at night. She continues to have back pain, for which she sees orthopedics. She states that epidural steroid injections were extremely effective, removing all back pain, but short-lived, lasting only 4 days. She is currently seeking pool therapy. Her biggest issue is her achilles pain L>R, which is worst when walking. She mentions seeing a podiatrist and being recommended a boot for her ankle pain, but that the boot made things worse, attributed to her osteoarthritis.     She denies joint pain in the morning, but notes that her wrists feel stiff when she wakes up. She denies morning foot soreness. She continues to have pain along the side of her tongue, which is helped by leucovorin. She currently takes 4 tablets of folic acid daily, and 1 tablet of leucovorin after her weekly methotrexate dose of 0.8mL. She mentions having some difficulty with obtaining methotrexate with preservatives, causing her to be unable to use her full prescription before it expires. She occasionally takes aleve.    Patient was seen by Dr. Saxena on February 14th, 2019 in  follow up of lumbar radicular pain. Assessment was of lumbar degenerative disc disease, herniation, and radicular pain, improved following epidural steroid injection in January 2019. Procedure on January 31st, 2019 was left L4-5 epidural steroid injection.    Rheumatological history:  Patient is a 58 year old female who was originally referred to rheumatology in 2/2017 by her PCP Dr. Busby for 3 months of BL hand pain thought consistent with CTS and positive RF/CCP. EMG showed moderate median neuropathy on L and severe on R, MRI c-spine normal. She received R CT injection by [facility] with 9 months of relief. She did describe intermittent episodes of swelling of her feet/ankles around this time period as well that resolved with medrol dose pack. She has a hx of L TKA and R TAWNY. She has chronic numbness of anterior BL shins following L TKA (was told they may have injected the nerve with marcaine around surgery years ago and numbness in R shin started in 2015 following a fall where developed large hematoma). When she was evaluated in 2/2017 she had no inflammatory arthritis symptoms and did not meet diagnosis of RA. We discussed starting plaquenil given it's role in potentially preventing pts with +RF/CCP from developing clinical RA, although she did not want to do this at the time. She was re-evaluated 8/2017 after developing polyarticular inflammatory arthritis of the right knee, ankles, wrists, hands, and shoulders. She was started on MTX, given depomedrol IM injection, oral prednisone, and given R knee CS injection. MTX increased and oral prednisone tapered to off in 9/2017. Had repeat R CT injection in 10/2017 that lasted until 1/2018. R knee significantly improved following CS injection 12/2017. Has persistent pain in L heel with enthesopathy on imaging, unclear if had true enthesitis in this region due to her RA. Participated in pool therapy winter 2018 with good response. At her 3/2018 OV she continued to  "have significant pain in her R wrist thought due to CT, but given some ongoing persistent EMS (BL hand stiffness lasting ALL day) as well and her known seropositive disease I recommended adding Humira to her regimen. This was ordered but she never started.     Interval History 10/3/18:  The patient was doing well overall but had occasional flares of joint pain. She noted up to an hour of early morning stiffness. She had multiple bouts of tongue pain attributed to methotrexate. She complained of back and bilateral achilles pain, for which she planned to follow up with orthopedics and podiatry. She also planned to follow up with cardiology regarding her recent echocardiogram.     Review of Systems:   Pertinent items are noted in HPI or as below, remainder of complete ROS is negative.      No recent problems with hearing or vision. No swallowing problems.   No breathing difficulty, shortness of breath, coughing, or wheezing  No chest pain or palpitations  No heart burn, indigestion, abdominal pain, nausea, vomiting, diarrhea  No urination problems, no bloody, cloudy urine, no dysuria  No numbing, tingling, weakness  No headaches or confusion  No rashes. No easy bleeding or bruising.     Active Medications:     Current Outpatient Medications:      DULoxetine (CYMBALTA) 60 MG EC capsule, TK ONE C PO  QAM, Disp: , Rfl: 0     folic acid (FOLVITE) 1 MG tablet, Take 4 tablets (4 mg) by mouth daily Take 3-4 tablets daily, Disp: 360 tablet, Rfl: 3     insulin syringe-needle U-100 (BD INSULIN SYRINGE ULTRAFINE) 30G X 1/2\" 1 ML, For methotrexate use weekly, Disp: 8 each, Rfl: prn     leucovorin (WELLCOVORIN) 5 MG tablet, Take 1 tablet (5 mg) by mouth daily Once weekly, take 24h after taking weekly methotrexate dose, Disp: 4 tablet, Rfl: 2     lisinopril (PRINIVIL/ZESTRIL) 10 MG tablet, Take 1 tablet (10 mg) by mouth daily, Disp: 90 tablet, Rfl: 3     methotrexate 50 MG/2ML injection CHEMO, Inject 1 mL (25 mg) Subcutaneous every " 7 days Inject 0.8 mL (20 mg) weekly, Disp: 4 mL, Rfl: 4     methylPREDNISolone (MEDROL DOSEPAK) 4 MG tablet therapy pack, Follow Package Directions, Disp: 21 tablet, Rfl: 0     methylPREDNISolone (MEDROL DOSEPAK) 4 MG tablet, Follow package instructions, Disp: 21 tablet, Rfl: 0     methylPREDNISolone (MEDROL DOSEPAK) 4 MG tablet, Follow package instructions, Disp: 21 tablet, Rfl: 0     tiZANidine (ZANAFLEX) 4 MG tablet, Take 1-2 tablets (4-8 mg) by mouth nightly as needed, Disp: 30 tablet, Rfl: 1     albuterol (2.5 MG/3ML) 0.083% neb solution, Take 1 vial (2.5 mg) by nebulization once for 1 dose, Disp: 3 mL, Rfl: 0     albuterol (PROAIR HFA/PROVENTIL HFA/VENTOLIN HFA) 108 (90 BASE) MCG/ACT Inhaler, Inhale 2 puffs into the lungs every 6 hours as needed for shortness of breath / dyspnea or wheezing (Patient not taking: Reported on 2/27/2019), Disp: 1 Inhaler, Rfl: 0     fluocinonide (LIDEX) 0.05 % ointment, Apply topically daily To hands and feet when dermatitis active (Patient not taking: Reported on 2/27/2019), Disp: 60 g, Rfl: 2     gabapentin (NEURONTIN) 100 MG capsule, Take 1 capsule (100 mg) by mouth 4 times daily (Patient not taking: Reported on 2/27/2019), Disp: 120 capsule, Rfl: 0    Current Facility-Administered Medications:      lidocaine (PF) (XYLOCAINE) 1 % injection 50 mg, 5 mL, Other, Once, Rosita Chandra MD     methylPREDNISolone acetate (DEPO-MEDROL) injection 40 mg, 40 mg, INTRA-ARTICULAR, Once, Rosita Chandra MD      Allergies:   Adhesive tape and Erythromycin      Past Medical History:  Depression   Eczema   Hyperlipidemia   Hypertension   Morbid obesity   Osteoarthrosis right knee   Sleep apnea   Tobacco use disorder   Primary localized osteoarthrosis, pelvic region and thigh  Degeneration of cervical intervertebral disc     Past Surgical History:  Left total knee arthoplasty 6/14/2012   Right hip arthroplasty 07/09/2004     Family History:   Mother: thyroid disease, hypertension,  "skin cancer, CVA  Paternal grandmother: breast cancer, pancreatic cancer   Father: alcoholism   Sister: thyroid disease, alcoholism   Maternal grandmother: hypertension   Sister: heart disease, multiple ablations   Paternal grandfather: prostate cancer      Social History:   Current everyday cigarette smoker, 1 pack/day, 33 years, started 1982  No smokeless tobacco use  Occasional alcohol use     Physical Exam:   /90   Pulse 106   Temp 98.4  F (36.9  C) (Oral)   Ht 1.676 m (5' 6\")   Wt (!) 193.6 kg (426 lb 12.8 oz)   SpO2 94%   BMI 68.89 kg/m      Wt Readings from Last 4 Encounters:   02/27/19 (!) 193.6 kg (426 lb 12.8 oz)   10/03/18 (!) 186 kg (410 lb 1.6 oz)   06/27/18 (!) 191.9 kg (423 lb 1.6 oz)   06/04/18 (!) 189.7 kg (418 lb 4.8 oz)     Constitutional: Well-developed, appearing stated age; cooperative  Eyes: Normal EOM, PERRLA, vision, conjunctiva, sclera  ENT: Normal external ears, nose, hearing, lips, teeth, gums, throat. No mucous membrane lesions, normal saliva pool  Neck: No mass or thyroid enlargement  Resp: Lungs clear to auscultation, nl to palpation  CV: RRR, no murmurs, rubs or gallops, no edema  GI: No ABD mass or tenderness, no HSM  : Not tested  Lymph: No cervical, supraclavicular, inguinal or epitrochlear nodes  MS: Tenderness at right 2nd PIP. Fist formation is good but causes discomfort. Pain elicited with full flexion of the right shoulder, mild impingement. Tenderness at bilateral achilles tendons.The TMJ, neck, elbow, wrist, MCP/DIP, spine, hip, knee, and foot MTP/IP joints were examined and found normal. No active synovitis or altered joint anatomy. Full joint ROM. Normal  strength. No dactylitis,  tenosynovitis, enthesopathy.  Skin: Psoriasis over dorsal aspect of the right wrist. Palmar aspect of both hands shows scaling erythematous patches and macules in the palms and flexures of both hands. No nail pitting, alopecia, nodules or lesions  Neuro: Normal cranial nerves, " strength, sensation, DTRs.   Psych: Normal judgement, orientation, memory, affect.     Laboratory:   RHEUM RESULTS Latest Ref Rng & Units 3/5/2018 6/4/2018 2/14/2019   SED RATE 0 - 30 mm/h 11 - -   CRP, INFLAMMATION 0.0 - 8.0 mg/L 7.1 - -   RHEUMATOID FACTOR <20 IU/mL - - -   EMERY SCREEN BY EIA <1.0 - - -   AST 0 - 45 U/L 18 22 12   ALT 0 - 50 U/L 28 40 29   ALBUMIN 3.4 - 5.0 g/dL 3.3(L) 3.3(L) 3.3(L)   WBC 4.0 - 11.0 10e9/L 7.4 7.5 8.9   RBC 3.8 - 5.2 10e12/L 5.06 4.97 5.14   HGB 11.7 - 15.7 g/dL 16.1(H) 15.8(H) 15.9(H)   HCT 35.0 - 47.0 % 48.8(H) 47.5(H) 48.7(H)   MCV 78 - 100 fl 96 96 95   MCHC 31.5 - 36.5 g/dL 33.0 33.3 32.6   RDW 10.0 - 15.0 % 13.9 13.7 13.4    - 450 10e9/L 235 214 307   CREATININE 0.52 - 1.04 mg/dL 0.52 0.56 0.60   GFR ESTIMATE, IF BLACK >60 mL/min/[1.73:m2] >90 >90 >90   GFR ESTIMATE >60 mL/min/[1.73:m2] >90 >90 >90    - 1,620 mg/dL - - -   IGA 70 - 380 mg/dL - - -   IGM 60 - 265 mg/dL - - -   HEPATITIS C ANTIBODY NR - - -       Rheumatoid Factor   Date Value Ref Range Status   08/28/2017 51 (H) <20 IU/mL Final   ,   Cyclic Citrullinated Peptide Antibody, IgG   Date Value Ref Range Status   08/28/2017 15 (H) <7 U/mL Final     Comment:     Positive     EMERY Screen by EIA   Date Value Ref Range Status   12/30/2016 <1.0  Interpretation:  Negative   <1.0 Final     Hepatitis B Core Caron   Date Value Ref Range Status   04/12/2017 Nonreactive NR Final   ,   Hep B Surface Agn   Date Value Ref Range Status   04/12/2017 Nonreactive NR Final     IGG   Date Value Ref Range Status   04/21/2017 940 695 - 1,620 mg/dL Final     IGA   Date Value Ref Range Status   04/21/2017 271 70 - 380 mg/dL Final     IGM   Date Value Ref Range Status   04/21/2017 395 (H) 60 - 265 mg/dL Final     Scribe Disclosure:   IArtie, am serving as a scribe to document services personally performed by Everett Austin MD at this visit, based upon the provider's statements to me. All documentation has been reviewed  by the aforementioned provider prior to being entered into the official medical record.     Portions of this medical record were completed by a scribe. UPON MY REVIEW AND AUTHENTICATION BY ELECTRONIC SIGNATURE, this confirms (a) I performed the applicable clinical services, and (b) the record is accurate.      Again, thank you for allowing me to participate in the care of your patient.      Sincerely,    Everett Austin MD

## 2019-02-27 NOTE — LETTER
2019      RE: Shira Rodriguez  [address] [address] [address]       [facility]  Everett Austin MD  2019     Name: Shira Rodriguez  MRN: [id number]  Age: 58 year old  : 1960  Referring provider: Anjel Busby     Assessment and Plan:     #  Inflammatory Arthritis (+RF/+CCP, dx 2017 with diffuse polyarticular inflammatory flare): Patient relates intermittent monoarticular arthralgia and bilateral Achilles tendon swelling with walking-associated pain. Morning stiffness is negligible. Exam reveals tenderness at the right 2nd PIP with painful flexion. Achilles tendon sheaths are tender and slightly swollen. There is eczematous eruption over the palmar surfaces of both hands, and psoriasiform rash over the dorsal aspect of the right hand. Laboratory values from  showed normal creatinine, liver function, and CBC.    Patient has serologic profile that aligns with classic rheumatoid arthritis, but recent symptoms of asymmetric monoarticular tenosynovitis, Achilles tendon sheath inflammation, and psoriasiform skin eruptions suggest a parallel process of seronegative spondyloarthropathy. Regardless of the label, I think there is low-grade activity of inflammatory arthropathy. I recommend increasing methotrexate to 25mg weekly by subcutaneous injection. To alleviate mucositis, I recommend continuing folic acid 4mg daily and leucovorin 5 mg 24 hours after each methotrexate dose. I will ask for Dermatology opinion regarding adjunct topical therapy for skin eruptions chronically affecting both hands.     # Hypertension: Patient is following with cardiology.    # DDD, lumbar spine: agree with MEAGHAN injections provided through Dr. Saxena.    Plan:  - methotrexate 50 MG/2ML injection CHEMO  Dispense: 4 mL; Refill: 4  - Creatinine  - ALT  - AST  - CBC with platelets  - DERMATOLOGY REFERRAL    Follow-up: Return in about 4 months (around 2019).      HPI:   Shira Rodriguez has a history of rheumatoid arthritis and presents for follow up. She was last seen on 10/3/18, at which time she continued on methotrexate 0.8 mL or 20 mg/week. She had previously been prescribed Humira but never started this.    Today, the patient reports having episodic pain in various joints on left hand for the past few weeks. Most recently, her left 4th PIP was transiently painful for the past week. She notes having left wrist soreness around a month ago, and current occasional right hand numbness in the mornings. She mentions previously receiving carpal tunnel injections that were helpful, and that she has restarted wearing volar wrist splits at night. She continues to have back pain, for which she sees orthopedics. She states that epidural steroid injections were extremely effective, removing all back pain, but short-lived, lasting only 4 days. She is currently seeking pool therapy. Her biggest issue is her achilles pain L>R, which is worst when walking. She mentions seeing a podiatrist and being recommended a boot for her ankle pain, but that the boot made things worse, attributed to her osteoarthritis.     She denies joint pain in the morning, but notes that her wrists feel stiff when she wakes up. She denies morning foot soreness. She continues to have pain along the side of her tongue, which is helped by leucovorin. She currently takes 4 tablets of folic acid daily, and 1 tablet of leucovorin after her weekly methotrexate dose of 0.8mL. She mentions having some difficulty with obtaining methotrexate with preservatives, causing her to be unable to use her full prescription before it expires. She occasionally takes aleve.    Patient was seen by Dr. Saxena on February 14th, 2019 in follow up of lumbar radicular pain. Assessment was of lumbar degenerative disc disease, herniation, and radicular pain, improved following epidural steroid injection in January 2019. Procedure on  January 31st, 2019 was left L4-5 epidural steroid injection.    Rheumatological history:  Patient is a 58 year old female who was originally referred to rheumatology in 2/2017 by her PCP Dr. Busby for 3 months of BL hand pain thought consistent with CTS and positive RF/CCP. EMG showed moderate median neuropathy on L and severe on R, MRI c-spine normal. She received R CT injection by sports Accolo with 9 months of relief. She did describe intermittent episodes of swelling of her feet/ankles around this time period as well that resolved with medrol dose pack. She has a hx of L TKA and R TAWNY. She has chronic numbness of anterior BL shins following L TKA (was told they may have injected the nerve with marcaine around surgery years ago and numbness in R shin started in 2015 following a fall where developed large hematoma). When she was evaluated in 2/2017 she had no inflammatory arthritis symptoms and did not meet diagnosis of RA. We discussed starting plaquenil given it's role in potentially preventing pts with +RF/CCP from developing clinical RA, although she did not want to do this at the time. She was re-evaluated 8/2017 after developing polyarticular inflammatory arthritis of the right knee, ankles, wrists, hands, and shoulders. She was started on MTX, given depomedrol IM injection, oral prednisone, and given R knee CS injection. MTX increased and oral prednisone tapered to off in 9/2017. Had repeat R CT injection in 10/2017 that lasted until 1/2018. R knee significantly improved following CS injection 12/2017. Has persistent pain in L heel with enthesopathy on imaging, unclear if had true enthesitis in this region due to her RA. Participated in pool therapy winter 2018 with good response. At her 3/2018 OV she continued to have significant pain in her R wrist thought due to CT, but given some ongoing persistent EMS (BL hand stiffness lasting ALL day) as well and her known seropositive disease I recommended adding  "Humira to her regimen. This was ordered but she never started.     Interval History 10/3/18:  The patient was doing well overall but had occasional flares of joint pain. She noted up to an hour of early morning stiffness. She had multiple bouts of tongue pain attributed to methotrexate. She complained of back and bilateral achilles pain, for which she planned to follow up with orthopedics and podiatry. She also planned to follow up with cardiology regarding her recent echocardiogram.     Review of Systems:   Pertinent items are noted in HPI or as below, remainder of complete ROS is negative.      No recent problems with hearing or vision. No swallowing problems.   No breathing difficulty, shortness of breath, coughing, or wheezing  No chest pain or palpitations  No heart burn, indigestion, abdominal pain, nausea, vomiting, diarrhea  No urination problems, no bloody, cloudy urine, no dysuria  No numbing, tingling, weakness  No headaches or confusion  No rashes. No easy bleeding or bruising.     Active Medications:     Current Outpatient Medications:      DULoxetine (CYMBALTA) 60 MG EC capsule, TK ONE C PO  QAM, Disp: , Rfl: 0     folic acid (FOLVITE) 1 MG tablet, Take 4 tablets (4 mg) by mouth daily Take 3-4 tablets daily, Disp: 360 tablet, Rfl: 3     insulin syringe-needle U-100 (BD INSULIN SYRINGE ULTRAFINE) 30G X 1/2\" 1 ML, For methotrexate use weekly, Disp: 8 each, Rfl: prn     leucovorin (WELLCOVORIN) 5 MG tablet, Take 1 tablet (5 mg) by mouth daily Once weekly, take 24h after taking weekly methotrexate dose, Disp: 4 tablet, Rfl: 2     lisinopril (PRINIVIL/ZESTRIL) 10 MG tablet, Take 1 tablet (10 mg) by mouth daily, Disp: 90 tablet, Rfl: 3     methotrexate 50 MG/2ML injection CHEMO, Inject 1 mL (25 mg) Subcutaneous every 7 days Inject 0.8 mL (20 mg) weekly, Disp: 4 mL, Rfl: 4     methylPREDNISolone (MEDROL DOSEPAK) 4 MG tablet therapy pack, Follow Package Directions, Disp: 21 tablet, Rfl: 0     " methylPREDNISolone (MEDROL DOSEPAK) 4 MG tablet, Follow package instructions, Disp: 21 tablet, Rfl: 0     methylPREDNISolone (MEDROL DOSEPAK) 4 MG tablet, Follow package instructions, Disp: 21 tablet, Rfl: 0     tiZANidine (ZANAFLEX) 4 MG tablet, Take 1-2 tablets (4-8 mg) by mouth nightly as needed, Disp: 30 tablet, Rfl: 1     albuterol (2.5 MG/3ML) 0.083% neb solution, Take 1 vial (2.5 mg) by nebulization once for 1 dose, Disp: 3 mL, Rfl: 0     albuterol (PROAIR HFA/PROVENTIL HFA/VENTOLIN HFA) 108 (90 BASE) MCG/ACT Inhaler, Inhale 2 puffs into the lungs every 6 hours as needed for shortness of breath / dyspnea or wheezing (Patient not taking: Reported on 2/27/2019), Disp: 1 Inhaler, Rfl: 0     fluocinonide (LIDEX) 0.05 % ointment, Apply topically daily To hands and feet when dermatitis active (Patient not taking: Reported on 2/27/2019), Disp: 60 g, Rfl: 2     gabapentin (NEURONTIN) 100 MG capsule, Take 1 capsule (100 mg) by mouth 4 times daily (Patient not taking: Reported on 2/27/2019), Disp: 120 capsule, Rfl: 0    Current Facility-Administered Medications:      lidocaine (PF) (XYLOCAINE) 1 % injection 50 mg, 5 mL, Other, Once, Rosita Chandra MD     methylPREDNISolone acetate (DEPO-MEDROL) injection 40 mg, 40 mg, INTRA-ARTICULAR, Once, Rosita Chandra MD      Allergies:   Adhesive tape and Erythromycin      Past Medical History:  Depression   Eczema   Hyperlipidemia   Hypertension   Morbid obesity   Osteoarthrosis right knee   Sleep apnea   Tobacco use disorder   Primary localized osteoarthrosis, pelvic region and thigh  Degeneration of cervical intervertebral disc     Past Surgical History:  Left total knee arthoplasty 6/14/2012   Right hip arthroplasty 07/09/2004     Family History:   Mother: thyroid disease, hypertension, skin cancer, CVA  Paternal grandmother: breast cancer, pancreatic cancer   Father: alcoholism   Sister: thyroid disease, alcoholism   Maternal grandmother: hypertension  "  Sister: heart disease, multiple ablations   Paternal grandfather: prostate cancer      Social History:   Current everyday cigarette smoker, 1 pack/day, 33 years, started 1982  No smokeless tobacco use  Occasional alcohol use     Physical Exam:   /90   Pulse 106   Temp 98.4  F (36.9  C) (Oral)   Ht 1.676 m (5' 6\")   Wt (!) 193.6 kg (426 lb 12.8 oz)   SpO2 94%   BMI 68.89 kg/m      Wt Readings from Last 4 Encounters:   02/27/19 (!) 193.6 kg (426 lb 12.8 oz)   10/03/18 (!) 186 kg (410 lb 1.6 oz)   06/27/18 (!) 191.9 kg (423 lb 1.6 oz)   06/04/18 (!) 189.7 kg (418 lb 4.8 oz)     Constitutional: Well-developed, appearing stated age; cooperative  Eyes: Normal EOM, PERRLA, vision, conjunctiva, sclera  ENT: Normal external ears, nose, hearing, lips, teeth, gums, throat. No mucous membrane lesions, normal saliva pool  Neck: No mass or thyroid enlargement  Resp: Lungs clear to auscultation, nl to palpation  CV: RRR, no murmurs, rubs or gallops, no edema  GI: No ABD mass or tenderness, no HSM  : Not tested  Lymph: No cervical, supraclavicular, inguinal or epitrochlear nodes  MS: Tenderness at right 2nd PIP. Fist formation is good but causes discomfort. Pain elicited with full flexion of the right shoulder, mild impingement. Tenderness at bilateral achilles tendons.The TMJ, neck, elbow, wrist, MCP/DIP, spine, hip, knee, and foot MTP/IP joints were examined and found normal. No active synovitis or altered joint anatomy. Full joint ROM. Normal  strength. No dactylitis,  tenosynovitis, enthesopathy.  Skin: Psoriasis over dorsal aspect of the right wrist. Palmar aspect of both hands shows scaling erythematous patches and macules in the palms and flexures of both hands. No nail pitting, alopecia, nodules or lesions  Neuro: Normal cranial nerves, strength, sensation, DTRs.   Psych: Normal judgement, orientation, memory, affect.     Laboratory:   RHEUM RESULTS Latest Ref Rng & Units 3/5/2018 6/4/2018 2/14/2019 "   SED RATE 0 - 30 mm/h 11 - -   CRP, INFLAMMATION 0.0 - 8.0 mg/L 7.1 - -   RHEUMATOID FACTOR <20 IU/mL - - -   EMERY SCREEN BY EIA <1.0 - - -   AST 0 - 45 U/L 18 22 12   ALT 0 - 50 U/L 28 40 29   ALBUMIN 3.4 - 5.0 g/dL 3.3(L) 3.3(L) 3.3(L)   WBC 4.0 - 11.0 10e9/L 7.4 7.5 8.9   RBC 3.8 - 5.2 10e12/L 5.06 4.97 5.14   HGB 11.7 - 15.7 g/dL 16.1(H) 15.8(H) 15.9(H)   HCT 35.0 - 47.0 % 48.8(H) 47.5(H) 48.7(H)   MCV 78 - 100 fl 96 96 95   MCHC 31.5 - 36.5 g/dL 33.0 33.3 32.6   RDW 10.0 - 15.0 % 13.9 13.7 13.4    - 450 10e9/L 235 214 307   CREATININE 0.52 - 1.04 mg/dL 0.52 0.56 0.60   GFR ESTIMATE, IF BLACK >60 mL/min/[1.73:m2] >90 >90 >90   GFR ESTIMATE >60 mL/min/[1.73:m2] >90 >90 >90    - 1,620 mg/dL - - -   IGA 70 - 380 mg/dL - - -   IGM 60 - 265 mg/dL - - -   HEPATITIS C ANTIBODY NR - - -       Rheumatoid Factor   Date Value Ref Range Status   08/28/2017 51 (H) <20 IU/mL Final   ,   Cyclic Citrullinated Peptide Antibody, IgG   Date Value Ref Range Status   08/28/2017 15 (H) <7 U/mL Final     Comment:     Positive     EMERY Screen by EIA   Date Value Ref Range Status   12/30/2016 <1.0  Interpretation:  Negative   <1.0 Final     Hepatitis B Core Caron   Date Value Ref Range Status   04/12/2017 Nonreactive NR Final   ,   Hep B Surface Agn   Date Value Ref Range Status   04/12/2017 Nonreactive NR Final     IGG   Date Value Ref Range Status   04/21/2017 940 695 - 1,620 mg/dL Final     IGA   Date Value Ref Range Status   04/21/2017 271 70 - 380 mg/dL Final     IGM   Date Value Ref Range Status   04/21/2017 395 (H) 60 - 265 mg/dL Final     Scribe Disclosure:   I, Artie Magana, am serving as a scribe to document services personally performed by Everett Austin MD at this visit, based upon the provider's statements to me. All documentation has been reviewed by the aforementioned provider prior to being entered into the official medical record.     Portions of this medical record were completed by a scribe. UPON MY REVIEW  AND AUTHENTICATION BY ELECTRONIC SIGNATURE, this confirms (a) I performed the applicable clinical services, and (b) the record is accurate.      Everett Austin MD

## 2019-02-27 NOTE — PATIENT INSTRUCTIONS
Dx:  1. Inflammatory arthropathy/rheumatoid arthritis, associated with psoriasis  Plan increase methotrexate to 25 mg (1.0 mL) injected once weekly.  Use Alleve 1 tab twice daily as needed  Dermatology referral for topical therapy recommendations.

## 2019-02-27 NOTE — NURSING NOTE
"Chief Complaint   Patient presents with     RECHECK     RA     /90   Pulse 106   Temp 98.4  F (36.9  C) (Oral)   Ht 1.676 m (5' 6\")   Wt (!) 193.6 kg (426 lb 12.8 oz)   SpO2 94%   BMI 68.89 kg/m    Yesica Vaughn CMA    "

## 2019-02-27 NOTE — TELEPHONE ENCOUNTER
"Received message from Dr. Austin that Shira had a question regarding methotrexate supply while she was in clinic today - as she is not getting enough for one month.     Called Shira to clarify issue,  for return call.     Also contacted [facility]- [address] to inquire if this was due to preservative free methotrexate. Spoke with pharmacist Georgette who said this is true - she was not getting enough because of this issue, but they have fixed that.     Noticed today's prescription has conflicting directions \"Inject 1 mL (25 mg) Subcutaneous every 7 days Inject 0.8 mL (20 mg) weekly - Subcutaneous.\" Updated pharmacy with plan for increase to 25 mg weekly per today's office note.      "

## 2019-03-20 ENCOUNTER — OFFICE VISIT (OUTPATIENT)
Dept: SLEEP MEDICINE | Facility: CLINIC | Age: 59
End: 2019-03-20
Payer: COMMERCIAL

## 2019-03-20 VITALS
WEIGHT: 293 LBS | OXYGEN SATURATION: 95 % | RESPIRATION RATE: 18 BRPM | BODY MASS INDEX: 47.09 KG/M2 | HEART RATE: 102 BPM | HEIGHT: 66 IN | SYSTOLIC BLOOD PRESSURE: 155 MMHG | DIASTOLIC BLOOD PRESSURE: 85 MMHG

## 2019-03-20 DIAGNOSIS — E66.01 MORBID OBESITY (H): ICD-10-CM

## 2019-03-20 DIAGNOSIS — R06.89 HYPOVENTILATION: Primary | ICD-10-CM

## 2019-03-20 DIAGNOSIS — G47.33 OSA (OBSTRUCTIVE SLEEP APNEA): ICD-10-CM

## 2019-03-20 PROCEDURE — 99215 OFFICE O/P EST HI 40 MIN: CPT | Performed by: PSYCHIATRY & NEUROLOGY

## 2019-03-20 ASSESSMENT — MIFFLIN-ST. JEOR: SCORE: 2538.36

## 2019-03-20 NOTE — PATIENT INSTRUCTIONS
Your BMI is Body mass index is 68.95 kg/m .  Weight management is a personal decision.  If you are interested in exploring weight loss strategies, the following discussion covers the approaches that may be successful. Body mass index (BMI) is one way to tell whether you are at a healthy weight, overweight, or obese. It measures your weight in relation to your height.  A BMI of 18.5 to 24.9 is in the healthy range. A person with a BMI of 25 to 29.9 is considered overweight, and someone with a BMI of 30 or greater is considered obese. More than two-thirds of American adults are considered overweight or obese.  Being overweight or obese increases the risk for further weight gain. Excess weight may lead to heart disease and diabetes.  Creating and following plans for healthy eating and physical activity may help you improve your health.  Weight control is part of healthy lifestyle and includes exercise, emotional health, and healthy eating habits. Careful eating habits lifelong are the mainstay of weight control. Though there are significant health benefits from weight loss, long-term weight loss with diet alone may be very difficult to achieve- studies show long-term success with dietary management in less than 10% of people. Attaining a healthy weight may be especially difficult to achieve in those with severe obesity. In some cases, medications, devices and surgical management might be considered.  What can you do?  If you are overweight or obese and are interested in methods for weight loss, you should discuss this with your provider.     Consider reducing daily calorie intake by 500 calories.     Keep a food journal.     Avoiding skipping meals, consider cutting portions instead.    Diet combined with exercise helps maintain muscle while optimizing fat loss. Strength training is particularly important for building and maintaining muscle mass. Exercise helps reduce stress, increase energy, and improves fitness.  Increasing exercise without diet control, however, may not burn enough calories to loose weight.       Start walking three days a week 10-20 minutes at a time    Work towards walking thirty minutes five days a week     Eventually, increase the speed of your walking for 1-2 minutes at time    In addition, we recommend that you review healthy lifestyles and methods for weight loss available through the National Institutes of Health patient information sites:  http://win.niddk.nih.gov/publications/index.htm    And look into health and wellness programs that may be available through your health insurance provider, employer, local community center, or [employer].    Weight management plan: Patient was referred to their PCP to discuss a diet and exercise plan.

## 2019-03-20 NOTE — NURSING NOTE
"Chief Complaint   Patient presents with     Sleep Problem     CYRUS, would like to discuss something other than BIPAP       Initial /85   Pulse 102   Resp 18   Ht 1.676 m (5' 5.98\")   Wt (!) 193.7 kg (427 lb)   SpO2 95%   BMI 68.95 kg/m   Estimated body mass index is 68.95 kg/m  as calculated from the following:    Height as of this encounter: 1.676 m (5' 5.98\").    Weight as of this encounter: 193.7 kg (427 lb).    Medication Reconciliation: complete    Neck circumference: 17 inches /45 centimeters.    ESS     Danae Ness MA      "

## 2019-03-21 NOTE — PROGRESS NOTES
Visit Date:   03/20/2019      Ms. Rodriguez is 58 years old.  She has a longstanding history of challenging sleep disordered breathing, in particular obstructive sleep apnea, sleep-related hypoxemia, hypoventilation in the setting of morbid obesity, and continued tobacco smoking.      The patient is a registered nurse and is quite insightful about the importance of treating her sleep, which is currently not being treated at all.  She is tired and sleepy and falling asleep in meetings at work.  In addition to that, she is also insightful about the critical importance of stopping smoking but is worried that if she does so, her weight will gain even more.      We had a long discussion about all of this and reviewed the results of her previous sleep studies and previous trials at PAP therapy.  She was previously placed on bilevel positive airway pressure because of a split-night study in 2017, which demonstrated that this was the best treatment, found during that study; however, she had a hard time keeping it on at night and we discussed various strategies to address this.  We did discuss the possibility of a dental appliance.  She was particularly interested in that.  However, I suspect that that would not be adequate to treat her sleep disordered breathing, considering the hypoxemia, hypoventilation, and morbid obesity.  Certainly, it would be better than no therapy which she is currently on.      Ultimately, we decided at this time, the best strategy would be for her to undergo a repeat sleep study to better understand where her sleep disordered breathing is at this point and whether or not we can come up with a new strategy to address it.  In particular, I asked that she get a wedge pillow today and start sleeping with the head of the bed elevated and we will arrange for her to have a titration polysomnogram performed with the head of the bed elevated to 45 degrees or to whatever is most comfortable for her,  then we  will start her off on low doses of CPAP only switch her over to bilevel if she has clear persistent hypoventilation.  Otherwise, if it is just obstructive events, they will be corrected with CPAP.  If they are just hypoxemia, in addition to obstructive events, we can add in supplemental oxygen.  All of this should be able to be controlled with lower pressures because of the head of the bed, will be elevated throughout the study and that is the way we plan on her sleeping at home.      In addition to that, patient also describes chronic coughing.  We explored in brief some reasons for nocturnal cough including asthma, postnasal drip as well as gastroesophageal reflux.  She indicated that she had a very good experience with Dr. Richardson in Pulmonary Medicine.  I think that would be an excellent idea for her to visit with Dr. Richardson again and I have printed off some contact information for Dr. Richardson's clinic for her to reach out to and she indicated that she would do that.  Certainly  controlling nocturnal cough will also help improve her positive airway pressure adherence.      In addition to performing her study with transcutaneous CO2 monitoring.  We will also perform pre- and post-study arterial blood gases as well.      All questions have been answered.  It is a great privilege being asked to participate in her care.      Forty minutes were spent with the patient today, greater than 50% of the time in counseling and coordination of care.  Of note, we specifically discussed the importance of weight loss, smoking cessation and never driving if tired or sleepy.         ANALISA LOVE MD             D: 2019   T: 2019   MT: JOSE MANUEL      Name:     XAVIER ROWELL   MRN:      [id number]        Account:      [id number]   :      1960           Visit Date:   2019      Document: [id number]

## 2019-03-25 DIAGNOSIS — T45.1X5A ADVERSE EFFECT OF METHOTREXATE, INITIAL ENCOUNTER: ICD-10-CM

## 2019-03-26 ENCOUNTER — MEDICAL CORRESPONDENCE (OUTPATIENT)
Dept: HEALTH INFORMATION MANAGEMENT | Facility: CLINIC | Age: 59
End: 2019-03-26

## 2019-03-26 NOTE — TELEPHONE ENCOUNTER
leucovorin (WELLCOVORIN) 5 MG tablet      Last Written Prescription Date:  10/3/18  Last Fill Quantity: 4 tablets,   # refills: 2  Last Office Visit : 2/27/19  Future Office visit:  6/26/19    Routing refill request to in-call provider for review/approval because:  Medication not on the Rheumatology refill protocol.

## 2019-03-28 RX ORDER — LEUCOVORIN CALCIUM 5 MG/1
5 TABLET ORAL
Qty: 4 TABLET | Refills: 3 | Status: SHIPPED | OUTPATIENT
Start: 2019-03-28 | End: 2019-06-26

## 2019-03-31 ENCOUNTER — THERAPY VISIT (OUTPATIENT)
Dept: SLEEP MEDICINE | Facility: CLINIC | Age: 59
End: 2019-03-31
Payer: COMMERCIAL

## 2019-03-31 DIAGNOSIS — R06.89 HYPOVENTILATION: ICD-10-CM

## 2019-03-31 DIAGNOSIS — E66.01 MORBID OBESITY (H): ICD-10-CM

## 2019-03-31 DIAGNOSIS — G47.33 OSA (OBSTRUCTIVE SLEEP APNEA): ICD-10-CM

## 2019-03-31 LAB
BASE EXCESS BLDA CALC-SCNC: 4.2 MMOL/L
HCO3 BLD-SCNC: 30 MMOL/L (ref 21–28)
O2/TOTAL GAS SETTING VFR VENT: 45 %
OXYHGB MFR BLD: 87 % (ref 92–100)
PCO2 BLD: 47 MM HG (ref 35–45)
PH BLD: 7.41 PH (ref 7.35–7.45)
PO2 BLD: 63 MM HG (ref 80–105)

## 2019-03-31 PROCEDURE — 36600 WITHDRAWAL OF ARTERIAL BLOOD: CPT | Performed by: PSYCHIATRY & NEUROLOGY

## 2019-03-31 PROCEDURE — 95811 POLYSOM 6/>YRS CPAP 4/> PARM: CPT | Mod: 52 | Performed by: INTERNAL MEDICINE

## 2019-03-31 PROCEDURE — 82805 BLOOD GASES W/O2 SATURATION: CPT | Performed by: PSYCHIATRY & NEUROLOGY

## 2019-04-01 ENCOUNTER — TELEPHONE (OUTPATIENT)
Dept: FAMILY MEDICINE | Facility: CLINIC | Age: 59
End: 2019-04-01

## 2019-04-01 NOTE — TELEPHONE ENCOUNTER
Spoke to patient who states that she has an appointment with Dr. Valadez in cardiology on 4/8/19. Patient is wondering if she should still do 48 hour Holter monitor or if she should wait to see what Cardiology says. Kelly Walsh LPN 4/1/2019 2:38 PM

## 2019-04-01 NOTE — PROGRESS NOTES
30 second epoch    4/1/19  19:25 am review of progressive bradycardia and 7.5 second ventricular pause and possible sinus exit block during stage R  Given sudden arousal and persistence of respiratory effort with reduced, simple vagotonia possible but ischemic event could not be excluded.  Patient advised to have emergency evaluation at the time of the event.  Primary physician Dr. Busby [holter or cardiology consult advised] and Sleep Medicine physician Dr. Greenfield notified.  Con Iber  4/1/19 08:50 addendum. Patient contacted-she was asymptomatic with this event but has contacted cardiology for evaluation.    Completed a all night titration PSG per provider order.    Preliminary AHI >20.  A final therapeutic PAP pressure was not achieved.    Supine REM was not seen on therapeutic pressure.    Patient reports feeling refreshed in AM.    **Pt was advised to go to ER after having 8 seconds of asystole during PSG. Pt refused and ended study.  Greater detail of events on PSG report.

## 2019-04-01 NOTE — TELEPHONE ENCOUNTER
Spoke to patient to relay message. Patient states verbal understanding. Kelly Walsh LPN 4/1/2019 2:54 PM

## 2019-04-01 NOTE — TELEPHONE ENCOUNTER
----- Message from Anjel Busby MD sent at 4/1/2019 12:33 PM CDT -----  Regarding: RE: dysrhythmia  Thanks!    Nury Can you call her re: heart rhythm disturbance on sleep test, help her set up 48 hr Holter for that. 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  ----- Message -----  From: Vj Hernandez MD  Sent: 4/1/2019   8:28 AM  To: Anjel Busby MD  Subject: dysrhythmia                                      Good morning,  I would advise Holter monitoring for verification or referral to cardiology for review of possible AV conduction disease based on 8 second ventricular pause documented during sleep study- see Therapy note on 3/31.    Paul Hernandez

## 2019-04-04 ENCOUNTER — TELEPHONE (OUTPATIENT)
Dept: SLEEP MEDICINE | Facility: CLINIC | Age: 59
End: 2019-04-04

## 2019-04-04 ENCOUNTER — MYC MEDICAL ADVICE (OUTPATIENT)
Dept: CARDIOLOGY | Facility: CLINIC | Age: 59
End: 2019-04-04

## 2019-04-04 NOTE — TELEPHONE ENCOUNTER
Patient had sleep study with an 8 second asystole during the test and is awaiting cardiology follow up.    Explained that even if we put the monitor on now we still wouldn't have the result at Monday's visit    Progress note 3/31/19  progressive bradycardia and 7.5 second ventricular pause and possible sinus exit block during stage R  Given sudden arousal and persistence of respiratory effort with reduced, simple vagotonia possible but ischemic event could not be excluded.  Patient advised to have emergency evaluation at the time of the event.

## 2019-04-04 NOTE — TELEPHONE ENCOUNTER
Phone call made to discuss further plans for sleep apnea treatment.     Optimal PAP settings were not clear from her titration PSG.     Patient had an 8 second asystole during the test and is awaiting cardiology follow up.      Patient was not available. Voice mail left to call back.

## 2019-04-05 LAB — SLPCOMP: NORMAL

## 2019-04-05 ASSESSMENT — ENCOUNTER SYMPTOMS
TINGLING: 0
SINUS PAIN: 0
WEIGHT LOSS: 0
ARTHRALGIAS: 1
MYALGIAS: 1
DISTURBANCES IN COORDINATION: 0
DIZZINESS: 0
SLEEP DISTURBANCES DUE TO BREATHING: 0
NIGHT SWEATS: 0
BOWEL INCONTINENCE: 0
MUSCLE CRAMPS: 0
HALLUCINATIONS: 0
FEVER: 0
POLYDIPSIA: 0
HYPERTENSION: 1
SPUTUM PRODUCTION: 1
VOMITING: 0
STIFFNESS: 1
RECTAL PAIN: 0
MEMORY LOSS: 0
HEMOPTYSIS: 0
WHEEZING: 1
ORTHOPNEA: 1
SYNCOPE: 0
SPEECH CHANGE: 0
JAUNDICE: 0
CHILLS: 0
BLOATING: 0
ALTERED TEMPERATURE REGULATION: 1
ABDOMINAL PAIN: 0
POLYPHAGIA: 0
WEIGHT GAIN: 0
NECK PAIN: 0
INSOMNIA: 1
JOINT SWELLING: 1
EXERCISE INTOLERANCE: 1
COUGH: 1
BACK PAIN: 1
HEADACHES: 0
BLOOD IN STOOL: 0
PALPITATIONS: 0
DYSPNEA ON EXERTION: 1
SORE THROAT: 0
SHORTNESS OF BREATH: 1
SNORES LOUDLY: 1
SMELL DISTURBANCE: 0
PANIC: 0
TROUBLE SWALLOWING: 0
LIGHT-HEADEDNESS: 0
INCREASED ENERGY: 1
LOSS OF CONSCIOUSNESS: 0
DECREASED CONCENTRATION: 0
MUSCLE WEAKNESS: 0
COUGH DISTURBING SLEEP: 1
NECK MASS: 0
PARALYSIS: 0
POSTURAL DYSPNEA: 1
NUMBNESS: 1
HEARTBURN: 0
TASTE DISTURBANCE: 0
SEIZURES: 0
DIARRHEA: 0
HYPOTENSION: 0
CONSTIPATION: 1
FATIGUE: 1
LEG PAIN: 0
DECREASED APPETITE: 0
DEPRESSION: 1
NERVOUS/ANXIOUS: 1
NAUSEA: 0
HOARSE VOICE: 0
WEAKNESS: 0
TREMORS: 0
SINUS CONGESTION: 0

## 2019-04-08 ENCOUNTER — ANCILLARY PROCEDURE (OUTPATIENT)
Dept: CARDIOLOGY | Facility: CLINIC | Age: 59
End: 2019-04-08
Attending: INTERNAL MEDICINE
Payer: COMMERCIAL

## 2019-04-08 VITALS
HEIGHT: 66 IN | WEIGHT: 293 LBS | OXYGEN SATURATION: 93 % | BODY MASS INDEX: 47.09 KG/M2 | SYSTOLIC BLOOD PRESSURE: 143 MMHG | DIASTOLIC BLOOD PRESSURE: 80 MMHG | HEART RATE: 109 BPM

## 2019-04-08 DIAGNOSIS — R00.1 BRADYCARDIA: Primary | ICD-10-CM

## 2019-04-08 DIAGNOSIS — I10 ESSENTIAL HYPERTENSION: ICD-10-CM

## 2019-04-08 DIAGNOSIS — R00.1 BRADYCARDIA: ICD-10-CM

## 2019-04-08 PROCEDURE — 99215 OFFICE O/P EST HI 40 MIN: CPT | Mod: 25 | Performed by: INTERNAL MEDICINE

## 2019-04-08 PROCEDURE — 93010 ELECTROCARDIOGRAM REPORT: CPT | Mod: ZP | Performed by: INTERNAL MEDICINE

## 2019-04-08 PROCEDURE — G0463 HOSPITAL OUTPT CLINIC VISIT: HCPCS | Mod: 25,ZF

## 2019-04-08 PROCEDURE — 40000988 CARDIAC MOBILE TELEMETRY MONITOR: Mod: ZF

## 2019-04-08 PROCEDURE — 93005 ELECTROCARDIOGRAM TRACING: CPT | Mod: ZF

## 2019-04-08 RX ORDER — SPIRONOLACTONE 25 MG/1
25 TABLET ORAL DAILY
Qty: 90 TABLET | Refills: 3 | Status: ON HOLD | OUTPATIENT
Start: 2019-04-08 | End: 2019-10-07

## 2019-04-08 ASSESSMENT — PAIN SCALES - GENERAL: PAINLEVEL: NO PAIN (0)

## 2019-04-08 ASSESSMENT — MIFFLIN-ST. JEOR: SCORE: 2552.22

## 2019-04-08 NOTE — PATIENT INSTRUCTIONS
You were seen today in the Cardiovascular Clinic at the [facility].   Cardiology Providers you saw during your visit:    Dr. Valadez    Recommendations:   Cardiac monitor x30 days  Begin Spironolactone 25mg once daily    Follow-up:   Follow up with Dr. Valadez in 6 weeks  BMP in 1 week to follow up on addition of new medication    For emergencies call 911.     If you have any questions regarding your visit please contact your care team:     Janki Flores RN  [facility]  Cardiology Care Coordinator    Appointment scheduling or nurse questions: [phone]    On Call Cardiologist for after hours or on weekends: [phone]    option #4    If you need a medication refill please contact your pharmacy.  Please allow 3 business days for your refill to be completed.    As always, thank you for trusting us with your health care needs!

## 2019-04-08 NOTE — LETTER
4/8/2019      RE: Shira Rodriguez  [address] [address] [address]       Dear Colleague,    Thank you for the opportunity to participate in the care of your patient, Shira Rodriguez, at the [facility] HEART [facility]. Please see a copy of my visit note below.    HPI:   Ms. Kristel Rodriguez is a 56-year old lady with hypertension, morbid obesity and exertional dyspnea.  She has preserved biventricular function with moderate left ventricular hypertrophy.  She is a active smoker and smokes a pack a day. She does not have diabetes or renal disease.     Interval history   Patient was last seen January 2017 and was initiated on lisinopril for hypertension.  She has lost to follow-up since.  Several active problems have ensued since as listed below.      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 So underwent a sleep study at [facility] on 3/31/19 and around 3.15 am felt suffocated and took the mask off. EKG showed a prolonged episode of sinus pause in REM during an obstructive hypopnea event. Patient declined transfer to ER and was advised to urgently follow up with cardiology. She does not report any driving accidents     She has been smoking and is afraid to take medications for concerns of weight gain    She was recently diagnosed with rheumatoid arthritis    She had a bile duct stone did not need ERCP    Has chronic back pain      PAST MEDICAL HISTORY:  Past Medical History:   Diagnosis Date     Chronic bronchitis (H) 07/30/2015     Contact dermatitis      Depressive disorder 1985     Eczema     HANDS     Elevated liver enzymes      H/O total knee replacement, left      History of total hip replacement 10/27/2011     Hyperlipidemia      Hypertension      Morbid obesity (H)      Osteoarthrosis     right knee     Sleep apnea      Tobacco use disorder        CURRENT MEDICATIONS:  Current  "Outpatient Medications   Medication Sig Dispense Refill     DULoxetine (CYMBALTA) 60 MG EC capsule TK ONE C PO  QAM  0     folic acid (FOLVITE) 1 MG tablet Take 4 tablets (4 mg) by mouth daily Take 3-4 tablets daily 360 tablet 3     insulin syringe-needle U-100 (BD INSULIN SYRINGE ULTRAFINE) 30G X 1/2\" 1 ML For methotrexate use weekly 8 each prn     leucovorin (WELLCOVORIN) 5 MG tablet Take 1 tablet (5 mg) by mouth every 7 days *take 24h after taking weekly methotrexate dose 4 tablet 3     lisinopril (PRINIVIL/ZESTRIL) 10 MG tablet Take 1 tablet (10 mg) by mouth daily 90 tablet 3     methotrexate 50 MG/2ML injection CHEMO Inject 1 mL (25 mg) Subcutaneous every 7 days Inject 0.8 mL (20 mg) weekly 4 mL 4     methylPREDNISolone (MEDROL DOSEPAK) 4 MG tablet Follow package instructions 21 tablet 0     albuterol (2.5 MG/3ML) 0.083% neb solution Take 1 vial (2.5 mg) by nebulization once for 1 dose 3 mL 0     albuterol (PROAIR HFA/PROVENTIL HFA/VENTOLIN HFA) 108 (90 BASE) MCG/ACT Inhaler Inhale 2 puffs into the lungs every 6 hours as needed for shortness of breath / dyspnea or wheezing (Patient not taking: Reported on 4/8/2019) 1 Inhaler 0     fluocinonide (LIDEX) 0.05 % ointment Apply topically daily To hands and feet when dermatitis active (Patient not taking: Reported on 4/8/2019) 60 g 2     gabapentin (NEURONTIN) 100 MG capsule Take 1 capsule (100 mg) by mouth 4 times daily (Patient not taking: Reported on 4/8/2019) 120 capsule 0     methylPREDNISolone (MEDROL DOSEPAK) 4 MG tablet therapy pack Follow Package Directions (Patient not taking: Reported on 4/8/2019) 21 tablet 0     methylPREDNISolone (MEDROL DOSEPAK) 4 MG tablet Follow package instructions (Patient not taking: Reported on 4/8/2019) 21 tablet 0     tiZANidine (ZANAFLEX) 4 MG tablet Take 1-2 tablets (4-8 mg) by mouth nightly as needed (Patient not taking: Reported on 4/8/2019) 30 tablet 1       PAST SURGICAL HISTORY:  Past Surgical History:   Procedure " "Laterality Date     ARTHROPLASTY KNEE  2012    Procedure: ARTHROPLASTY KNEE;  Left Total Knee Arthroplasty;  Surgeon: Annette Quesada MD;  Location: [facility] OR     ARTHROSCOPY HIP Right      C TOTAL HIP ARTHROPLASTY  04    RT       ALLERGIES     Allergies   Allergen Reactions     Adhesive Tape Blisters     Erythromycin Rash       FAMILY HISTORY:  Father  when she was age 12. Potentially had MI   PGF with MI though ?age   Mother with HTN, TIA and stroke  Three paternal uncles with CABG, at least one in early 50s     SOCIAL HISTORY:  - Current smoker, smoked 30 years x 1 PPD  - Typically has less than one drink/month  - No illicit drugs      ROS:   Constitutional: No fever, chills, or sweats. No weight gain/loss   ENT: No visual disturbance, ear ache, epistaxis, sore throat  Allergies/Immunologic: Negative.   Respiratory: No cough, hemoptysia  Cardiovascular: As per HPI  GI: No nausea, vomiting, hematemesis, melena, or hematochezia  : No urinary frequency, dysuria, or hematuria  Integument: Negative  Psychiatric: Negative  Neuro: Negative  Endocrinology: Negative   Musculoskeletal: Negative    EXAM:  /80 (BP Location: Other (Comment), Patient Position: Chair, Cuff Size: Adult Large)   Pulse 109   Ht 1.676 m (5' 6\")   Wt (!) 195 kg (430 lb)   SpO2 93%   BMI 69.40 kg/m     In general, the patient is a pleasant female in no apparent distress.      HEENT: NC/AT.  PERRLA.  EOMI.  Sclerae white, not injected.    Neck: Carotids 2+ bilaterally without bruits.  No jugular venous distension.   Lymph: No cervical adenopathy. No thyromegaly.   Heart: RRR. Normal S1, S2. No murmur, rub, click, or gallop. There is no heave.    Lungs: Clear bilaterally.  No rhonchi, wheezes, rales.   GI: Soft, nontender, nondistended.   Extremities: No edema.  The pulses are 2+at the radial and DP bilaterally.  Neuro: grossly non focal.   Skin: no rashes.  Endocrine: no thyromegaly  Musculoskeletal: no joint swelling " or tenderness, gait normal.  Psych: pleasant and conversant      Labs:  LIPID RESULTS:  Lab Results   Component Value Date    CHOL 192 09/24/2016    HDL 48 (L) 09/24/2016     (H) 09/24/2016    TRIG 141 09/24/2016    CHOLHDLRATIO 4.5 06/01/2012    NHDL 144 (H) 09/24/2016       LIVER ENZYME RESULTS:  Lab Results   Component Value Date    AST 12 02/14/2019    ALT 29 02/14/2019       CBC RESULTS:  Lab Results   Component Value Date    WBC 8.9 02/14/2019    RBC 5.14 02/14/2019    HGB 15.9 (H) 02/14/2019    HCT 48.7 (H) 02/14/2019    MCV 95 02/14/2019    MCH 30.9 02/14/2019    MCHC 32.6 02/14/2019    RDW 13.4 02/14/2019     02/14/2019       BMP RESULTS:  Lab Results   Component Value Date     09/18/2017    POTASSIUM 4.3 09/18/2017    CHLORIDE 103 09/18/2017    CO2 28 09/18/2017    ANIONGAP 7 09/18/2017     (H) 09/18/2017    BUN 14 09/18/2017    CR 0.60 02/14/2019    GFRESTIMATED >90 02/14/2019    GFRESTBLACK >90 02/14/2019    MARIA INES 9.8 12/04/2017        A1C RESULTS:  Lab Results   Component Value Date    A1C 5.8 09/18/2017       INR RESULTS:  Lab Results   Component Value Date    INR 1.01 04/20/2017    INR 1.31 (H) 06/18/2012       Procedures:    ECG today - NSR      EKG 1/16/17:    NSR, Rate approx 95 bpm, normal axis, normal intervals. Poor R wave progression, ?pathologic Q waves in III and AVF.    Echo 6/7/18  Moderate concentric wall thickening consistent with left ventricular hypertrophy is present.  Biventricular size and systolic function is normal.The LVEF is 55-60%.  No significant valve disease.  Inferior vena cava appers dilated measuring 2.4 cm    MPI stress test 5/22/2017  1.  Probably normal myocardial SPECT study with a summed stress score  of  4 . A summed stress score of 4 is associated with an annual event  rate of 0.8% and 0.9% for myocardial infarction and cardiac death,  respectively (Jaja. Circulation 1998;98:535-43).  2.  Small area of apical photopenia without  reversibility which most  likely represents attenuation artifact.  3.   Normal left ventricular systolic function with a left ventricular  ejection fraction of  58%.   4.   No prior study available for comparison    ECHO (12/30/2016)  Global and regional left ventricular function is normal with an EF of 55-60%.  Moderate concentric left ventricular hypertrophy.  Right ventricular function, chamber size, wall motion, and thickness are normal.  The IVC is normal. The estimated right atrial pressure is < 5 mmHg.  No pericardial effusion is present.  Previous study not available for comparison.      Assessment and Plan:     Mrs. Shira Rodriguez is a 58-year old lady with    - Prolonged sinus pause during recent sleep study  - Hypertension, poorly controlled  - Diastolic heart failure/HFpEF; NYHA III, AHA/ACC stage C  - Dyslipidemia  - estimated 10-yr ASCVD risk of 9%    - Smoking  - Sleep apnea    Bradycardia arrhythmias and sinus pauses can be seen in the setting of significant apnea.  But her episode appears to have been a prolonged pause which warrants further interrogation.    Plan  Mobile Tele monitor for tachy or bradyarrhythmias assessment  Continue lisinopril 10 mg q24h.   Add aldactone 25 mg/d  ASA 81 mg q24h for primary prevention  Advised regarding limiting salt intake, weight management, daily walking, and smoking cessation   Advised to seek immediate medical attention in the event she develops presyncope or syncope.  Follow-up with pulmonary for dyspnea evaluation    F/u 6 weeks  Brigette Valadez MD, MS  Staff Cardiologist  Pager: [phone]        CC  Patient Care Team:  Anjel Busby MD as PCP - General (Family Practice)  Georgina Richardson MD as MD (Pulmonary)  Anjel Busby MD as MD (Family Practice)  Anjel Chris MD as MD (Family Medicine - Sports Medicine)  Tashi Saxena MD as MD (Family Medicine - Sports Medicine)  Myrna Kim, RN as Nurse Coordinator  (Bariatric)  Everett Austin MD as MD (Rheumatology)  AMANDA JULIEN

## 2019-04-08 NOTE — PROGRESS NOTES
"HPI:   Ms. Kristel Rodriguez is a 56-year old lady with hypertension, morbid obesity and exertional dyspnea.  She has preserved biventricular function with moderate left ventricular hypertrophy.  She is a active smoker and smokes a pack a day. She does not have diabetes or renal disease.     Interval history   Patient was last seen January 2017 and was initiated on lisinopril for hypertension.  She has lost to follow-up since.  Several active problems have ensued since as listed below.      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 So underwent a sleep study at [facility] on 3/31/19 and around 3.15 am felt suffocated and took the mask off. EKG showed a prolonged episode of sinus pause in REM during an obstructive hypopnea event. Patient declined transfer to ER and was advised to urgently follow up with cardiology. She does not report any driving accidents     She has been smoking and is afraid to take medications for concerns of weight gain    She was recently diagnosed with rheumatoid arthritis    She had a bile duct stone did not need ERCP    Has chronic back pain      PAST MEDICAL HISTORY:  Past Medical History:   Diagnosis Date     Chronic bronchitis (H) 07/30/2015     Contact dermatitis      Depressive disorder 1985     Eczema     HANDS     Elevated liver enzymes      H/O total knee replacement, left      History of total hip replacement 10/27/2011     Hyperlipidemia      Hypertension      Morbid obesity (H)      Osteoarthrosis     right knee     Sleep apnea      Tobacco use disorder        CURRENT MEDICATIONS:  Current Outpatient Medications   Medication Sig Dispense Refill     DULoxetine (CYMBALTA) 60 MG EC capsule TK ONE C PO  QAM  0     folic acid (FOLVITE) 1 MG tablet Take 4 tablets (4 mg) by mouth daily Take 3-4 tablets daily 360 tablet 3     insulin syringe-needle U-100 (BD INSULIN SYRINGE ULTRAFINE) 30G X 1/2\" 1 ML For methotrexate use " weekly 8 each prn     leucovorin (WELLCOVORIN) 5 MG tablet Take 1 tablet (5 mg) by mouth every 7 days *take 24h after taking weekly methotrexate dose 4 tablet 3     lisinopril (PRINIVIL/ZESTRIL) 10 MG tablet Take 1 tablet (10 mg) by mouth daily 90 tablet 3     methotrexate 50 MG/2ML injection CHEMO Inject 1 mL (25 mg) Subcutaneous every 7 days Inject 0.8 mL (20 mg) weekly 4 mL 4     methylPREDNISolone (MEDROL DOSEPAK) 4 MG tablet Follow package instructions 21 tablet 0     albuterol (2.5 MG/3ML) 0.083% neb solution Take 1 vial (2.5 mg) by nebulization once for 1 dose 3 mL 0     albuterol (PROAIR HFA/PROVENTIL HFA/VENTOLIN HFA) 108 (90 BASE) MCG/ACT Inhaler Inhale 2 puffs into the lungs every 6 hours as needed for shortness of breath / dyspnea or wheezing (Patient not taking: Reported on 4/8/2019) 1 Inhaler 0     fluocinonide (LIDEX) 0.05 % ointment Apply topically daily To hands and feet when dermatitis active (Patient not taking: Reported on 4/8/2019) 60 g 2     gabapentin (NEURONTIN) 100 MG capsule Take 1 capsule (100 mg) by mouth 4 times daily (Patient not taking: Reported on 4/8/2019) 120 capsule 0     methylPREDNISolone (MEDROL DOSEPAK) 4 MG tablet therapy pack Follow Package Directions (Patient not taking: Reported on 4/8/2019) 21 tablet 0     methylPREDNISolone (MEDROL DOSEPAK) 4 MG tablet Follow package instructions (Patient not taking: Reported on 4/8/2019) 21 tablet 0     tiZANidine (ZANAFLEX) 4 MG tablet Take 1-2 tablets (4-8 mg) by mouth nightly as needed (Patient not taking: Reported on 4/8/2019) 30 tablet 1       PAST SURGICAL HISTORY:  Past Surgical History:   Procedure Laterality Date     ARTHROPLASTY KNEE  6/14/2012    Procedure: ARTHROPLASTY KNEE;  Left Total Knee Arthroplasty;  Surgeon: Annette Quesada MD;  Location: [facility]     ARTHROSCOPY HIP Right      C TOTAL HIP ARTHROPLASTY  07/09/04    RT       ALLERGIES     Allergies   Allergen Reactions     Adhesive Tape Blisters     Erythromycin Rash  "      FAMILY HISTORY:  Father  when she was age 12. Potentially had MI   PGF with MI though ?age   Mother with HTN, TIA and stroke  Three paternal uncles with CABG, at least one in early 50s     SOCIAL HISTORY:  - Current smoker, smoked 30 years x 1 PPD  - Typically has less than one drink/month  - No illicit drugs      ROS:   Constitutional: No fever, chills, or sweats. No weight gain/loss   ENT: No visual disturbance, ear ache, epistaxis, sore throat  Allergies/Immunologic: Negative.   Respiratory: No cough, hemoptysia  Cardiovascular: As per HPI  GI: No nausea, vomiting, hematemesis, melena, or hematochezia  : No urinary frequency, dysuria, or hematuria  Integument: Negative  Psychiatric: Negative  Neuro: Negative  Endocrinology: Negative   Musculoskeletal: Negative    EXAM:  /80 (BP Location: Other (Comment), Patient Position: Chair, Cuff Size: Adult Large)   Pulse 109   Ht 1.676 m (5' 6\")   Wt (!) 195 kg (430 lb)   SpO2 93%   BMI 69.40 kg/m    In general, the patient is a pleasant female in no apparent distress.      HEENT: NC/AT.  PERRLA.  EOMI.  Sclerae white, not injected.    Neck: Carotids 2+ bilaterally without bruits.  No jugular venous distension.   Lymph: No cervical adenopathy. No thyromegaly.   Heart: RRR. Normal S1, S2. No murmur, rub, click, or gallop. There is no heave.    Lungs: Clear bilaterally.  No rhonchi, wheezes, rales.   GI: Soft, nontender, nondistended.   Extremities: No edema.  The pulses are 2+at the radial and DP bilaterally.  Neuro: grossly non focal.   Skin: no rashes.  Endocrine: no thyromegaly  Musculoskeletal: no joint swelling or tenderness, gait normal.  Psych: pleasant and conversant      Labs:  LIPID RESULTS:  Lab Results   Component Value Date    CHOL 192 2016    HDL 48 (L) 2016     (H) 2016    TRIG 141 2016    CHOLHDLRATIO 4.5 2012    NHDL 144 (H) 2016       LIVER ENZYME RESULTS:  Lab Results   Component Value " Date    AST 12 02/14/2019    ALT 29 02/14/2019       CBC RESULTS:  Lab Results   Component Value Date    WBC 8.9 02/14/2019    RBC 5.14 02/14/2019    HGB 15.9 (H) 02/14/2019    HCT 48.7 (H) 02/14/2019    MCV 95 02/14/2019    MCH 30.9 02/14/2019    MCHC 32.6 02/14/2019    RDW 13.4 02/14/2019     02/14/2019       BMP RESULTS:  Lab Results   Component Value Date     09/18/2017    POTASSIUM 4.3 09/18/2017    CHLORIDE 103 09/18/2017    CO2 28 09/18/2017    ANIONGAP 7 09/18/2017     (H) 09/18/2017    BUN 14 09/18/2017    CR 0.60 02/14/2019    GFRESTIMATED >90 02/14/2019    GFRESTBLACK >90 02/14/2019    MARIA INES 9.8 12/04/2017        A1C RESULTS:  Lab Results   Component Value Date    A1C 5.8 09/18/2017       INR RESULTS:  Lab Results   Component Value Date    INR 1.01 04/20/2017    INR 1.31 (H) 06/18/2012       Procedures:    ECG today - NSR      EKG 1/16/17:    NSR, Rate approx 95 bpm, normal axis, normal intervals. Poor R wave progression, ?pathologic Q waves in III and AVF.    Echo 6/7/18  Moderate concentric wall thickening consistent with left ventricular hypertrophy is present.  Biventricular size and systolic function is normal.The LVEF is 55-60%.  No significant valve disease.  Inferior vena cava appers dilated measuring 2.4 cm    MPI stress test 5/22/2017  1.  Probably normal myocardial SPECT study with a summed stress score  of  4 . A summed stress score of 4 is associated with an annual event  rate of 0.8% and 0.9% for myocardial infarction and cardiac death,  respectively (Marymokaleb. Circulation 1998;98:535-43).  2.  Small area of apical photopenia without reversibility which most  likely represents attenuation artifact.  3.   Normal left ventricular systolic function with a left ventricular  ejection fraction of  58%.   4.   No prior study available for comparison    ECHO (12/30/2016)  Global and regional left ventricular function is normal with an EF of 55-60%.  Moderate concentric left  ventricular hypertrophy.  Right ventricular function, chamber size, wall motion, and thickness are normal.  The IVC is normal. The estimated right atrial pressure is < 5 mmHg.  No pericardial effusion is present.  Previous study not available for comparison.      Assessment and Plan:     Mrs. Shira Rodriguez is a 58-year old lady with    - Prolonged sinus pause during recent sleep study  - Hypertension, poorly controlled  - Diastolic heart failure/HFpEF; NYHA III, AHA/ACC stage C  - Dyslipidemia  - estimated 10-yr ASCVD risk of 9%    - Smoking  - Sleep apnea    Bradycardia arrhythmias and sinus pauses can be seen in the setting of significant apnea.  But her episode appears to have been a prolonged pause which warrants further interrogation.    Plan  Mobile Tele monitor for tachy or bradyarrhythmias assessment  Continue lisinopril 10 mg q24h.   Add aldactone 25 mg/d  ASA 81 mg q24h for primary prevention  Advised regarding limiting salt intake, weight management, daily walking, and smoking cessation   Advised to seek immediate medical attention in the event she develops presyncope or syncope.  Follow-up with pulmonary for dyspnea evaluation    F/u 6 weeks  Brigette Valadez MD, MS  Staff Cardiologist  Pager: [phone]        CC  Patient Care Team:  Anjel Busby MD as PCP - General (Family Practice)  Georgina Richardson MD as MD (Pulmonary)  Anjel Busby MD as MD (Family Practice)  Anjel Chris MD as MD (Family Medicine - Sports Medicine)  Tashi Saxena MD as MD (Family Medicine - Sports Medicine)  Myrna Kim, RN as Nurse Coordinator (Bariatric)  Everett Austin MD as MD (Rheumatology)  ANJEL BUSBY

## 2019-04-08 NOTE — NURSING NOTE
Cardiac Monitors: Patient was instructed regarding the indication, function, care and prompt return of a Mobile Tele monitor. The monitor was placed on the patient with instructions regarding care of the skin electrodes and monitor, as well as documentation in the patient diary. Patient demonstrated understanding of this information and agreed to call with further questions or concerns.    Med Reconcile: Reviewed and verified all current medications with the patient. The updated medication list was printed and given to the patient.    New Medication: Patient was educated regarding newly prescribed medication (Spironolactone), including discussion of  the indication, administration, side effects, and when to report to MD or RN. Patient demonstrated understanding of this information and agreed to call with further questions or concerns.    Patient was instructed to return for the next laboratory testing 1 week.     Return Appointment: Patient given instructions regarding scheduling next clinic visit. Patient demonstrated understanding of this information and agreed to call with further questions or concerns.    Patient stated she understood all health information given and agreed to call with further questions or concerns.    You were seen today in the Cardiovascular Clinic at the [facility].   Cardiology Providers you saw during your visit:    Dr. Valadez    Recommendations:   Cardiac monitor x30 days  Begin Spironolactone 25mg once daily    Follow-up:   Follow up with Dr. Valadez in 6 weeks  BMP in 1 week to follow up on addition of new medication    -----------------------------------------  **Verified patient has an appt with Pulmonologist in 2 weeks, so changed the appt date of the follow up BMP to coordinate.  Jess Fink RN on 4/8/2019 at 2:04 PM

## 2019-04-08 NOTE — PROGRESS NOTES
Per Dr. Valadez, patient to have 30 days cardionet monitor placed.  Diagnosis: Essential Hypertension  Monitor placed: Yes  Patient Instructed: Yes  Patient verbalized understanding: Yes  Holter # [id number]  Card ID: [id number]  Placed by; maxwell Gamboa CMA

## 2019-04-09 LAB — INTERPRETATION ECG - MUSE: NORMAL

## 2019-04-09 ASSESSMENT — ENCOUNTER SYMPTOMS
NUMBNESS: 1
NECK PAIN: 0
POSTURAL DYSPNEA: 1
CHILLS: 0
COUGH: 1
LOSS OF CONSCIOUSNESS: 0
HOARSE VOICE: 0
MUSCLE CRAMPS: 0
JOINT SWELLING: 1
TREMORS: 0
HYPOTENSION: 0
POLYDIPSIA: 0
HEADACHES: 0
SHORTNESS OF BREATH: 1
BACK PAIN: 1
SNORES LOUDLY: 1
WEAKNESS: 1
SPEECH CHANGE: 0
WEIGHT LOSS: 0
SINUS CONGESTION: 0
LIGHT-HEADEDNESS: 0
SINUS PAIN: 0
ALTERED TEMPERATURE REGULATION: 0
COUGH DISTURBING SLEEP: 1
ORTHOPNEA: 1
STIFFNESS: 1
NERVOUS/ANXIOUS: 1
HALLUCINATIONS: 0
WEIGHT GAIN: 0
HEMOPTYSIS: 0
MUSCLE WEAKNESS: 1
SPUTUM PRODUCTION: 1
SLEEP DISTURBANCES DUE TO BREATHING: 0
HYPERTENSION: 0
INSOMNIA: 1
PARALYSIS: 0
DECREASED CONCENTRATION: 1
ARTHRALGIAS: 1
SMELL DISTURBANCE: 0
NECK MASS: 0
EXERCISE INTOLERANCE: 1
DISTURBANCES IN COORDINATION: 0
NIGHT SWEATS: 0
INCREASED ENERGY: 1
DECREASED APPETITE: 0
TINGLING: 0
TASTE DISTURBANCE: 0
DYSPNEA ON EXERTION: 1
FATIGUE: 1
SYNCOPE: 0
PANIC: 0
MYALGIAS: 1
FEVER: 0
SEIZURES: 0
SORE THROAT: 0
PALPITATIONS: 0
TROUBLE SWALLOWING: 0
WHEEZING: 1
LEG PAIN: 1
DIZZINESS: 0
MEMORY LOSS: 0
POLYPHAGIA: 0
DEPRESSION: 1

## 2019-04-10 ENCOUNTER — TELEPHONE (OUTPATIENT)
Dept: CARDIOLOGY | Facility: CLINIC | Age: 59
End: 2019-04-10

## 2019-04-10 NOTE — TELEPHONE ENCOUNTER
Was notified by [facility] that patient had a 3 sec sinus pause at 0516 and a 3.3 sec sinus pause at 0545 this morning. Called patient and left a voice message (since patient's chart notes that leaving a voice message is OK) saying to present to the ED if having any symptoms like LH/dizziness, chest pain, and SOB. If asymptomatic, no need to present to the ED. Advised patient to contact Dr. Valadez's office if she has any questions.      Kim Denise MD, PhD  Cardiology Fellow  [phone]

## 2019-04-22 ENCOUNTER — OFFICE VISIT (OUTPATIENT)
Dept: CARDIOLOGY | Facility: CLINIC | Age: 59
End: 2019-04-22
Attending: INTERNAL MEDICINE
Payer: COMMERCIAL

## 2019-04-22 VITALS
BODY MASS INDEX: 47.09 KG/M2 | OXYGEN SATURATION: 97 % | SYSTOLIC BLOOD PRESSURE: 143 MMHG | DIASTOLIC BLOOD PRESSURE: 85 MMHG | WEIGHT: 293 LBS | HEART RATE: 91 BPM | HEIGHT: 66 IN

## 2019-04-22 DIAGNOSIS — I45.5 SINUS PAUSE: Primary | ICD-10-CM

## 2019-04-22 DIAGNOSIS — R00.1 VAGAL BRADYCARDIA: ICD-10-CM

## 2019-04-22 DIAGNOSIS — G47.33 OSA (OBSTRUCTIVE SLEEP APNEA): Chronic | ICD-10-CM

## 2019-04-22 DIAGNOSIS — R00.1 SINUS BRADYCARDIA: Primary | ICD-10-CM

## 2019-04-22 PROCEDURE — G0463 HOSPITAL OUTPT CLINIC VISIT: HCPCS | Mod: ZF

## 2019-04-22 PROCEDURE — 99214 OFFICE O/P EST MOD 30 MIN: CPT | Mod: ZP | Performed by: INTERNAL MEDICINE

## 2019-04-22 ASSESSMENT — PAIN SCALES - GENERAL: PAINLEVEL: NO PAIN (0)

## 2019-04-22 ASSESSMENT — MIFFLIN-ST. JEOR: SCORE: 2552.22

## 2019-04-22 NOTE — NURSING NOTE
Chief Complaint   Patient presents with     New Patient     reason for visit: nocturnal pause up to 8 seconds     Vitals were taken and medications were reconciled.     MELISA Jaramillo  1:44 PM

## 2019-04-22 NOTE — LETTER
"4/22/2019      RE: Shira Rodriguez  [id number] [address] [address]       Dear Colleague,    Thank you for the opportunity to participate in the care of your patient, Shira Rodriguez, at the [facility]. Please see a copy of my visit note below.          Clinical Cardiac Electrophysiology    Chief Complaint: Prolong Pause    HPI:   Shira Rodriguez is a 59yo F with morbid obesity complicated by HTN and CYRUS who is presenting for evaluation pauses noted during a sleep study. Patient recently underwent sleep study to reassess need for CPAP/adjustments due to increase day time sleepiness. Around 4am during sleep study patient describes waking up suddenly from sleep with the need to take off CPAP. Further analysis of that episode revealed patients HR had slowed down significantly, with a pause lasting ~8sec. PAtient denies any day time dizzyness or lightheadedness. No palpitations, chest pain, leg swelling, PND or orthopnea. She does report some dyspnea with exertion for which she is going to see a pulmonologist for. After this episode patient was placed on a event monitor which revealed pauses during sleep lasting up to 3 sec.     Current Outpatient Medications   Medication Sig Dispense Refill     DULoxetine (CYMBALTA) 60 MG EC capsule TK ONE C PO  QAM  0     folic acid (FOLVITE) 1 MG tablet Take 4 tablets (4 mg) by mouth daily Take 3-4 tablets daily 360 tablet 3     insulin syringe-needle U-100 (BD INSULIN SYRINGE ULTRAFINE) 30G X 1/2\" 1 ML For methotrexate use weekly 8 each prn     leucovorin (WELLCOVORIN) 5 MG tablet Take 1 tablet (5 mg) by mouth every 7 days *take 24h after taking weekly methotrexate dose 4 tablet 3     lisinopril (PRINIVIL/ZESTRIL) 10 MG tablet Take 1 tablet (10 mg) by mouth daily 90 tablet 3     methotrexate 50 MG/2ML injection CHEMO Inject 1 mL (25 mg) Subcutaneous every 7 days Inject 0.8 mL (20 mg) weekly 4 mL 4     " methylPREDNISolone (MEDROL DOSEPAK) 4 MG tablet Follow package instructions 21 tablet 0     spironolactone (ALDACTONE) 25 MG tablet Take 1 tablet (25 mg) by mouth daily 90 tablet 3       Past Medical History:   Diagnosis Date     Chronic bronchitis (H) 07/30/2015     Contact dermatitis      Depressive disorder 1985     Eczema     HANDS     Elevated liver enzymes      H/O total knee replacement, left      History of total hip replacement 10/27/2011     Hyperlipidemia      Hypertension      Morbid obesity (H)      Osteoarthrosis     right knee     Sleep apnea      Tobacco use disorder        Past Surgical History:   Procedure Laterality Date     ARTHROPLASTY KNEE  6/14/2012    Procedure: ARTHROPLASTY KNEE;  Left Total Knee Arthroplasty;  Surgeon: Annette Quesada MD;  Location: [facility] OR     ARTHROSCOPY HIP Right      C TOTAL HIP ARTHROPLASTY  07/09/04    RT       Family History   Problem Relation Age of Onset     Thyroid Disease Mother      Hypertension Mother      Skin Cancer Mother         MMohs surgery with skin graft     Cerebrovascular Disease Mother         CVA after endarderectomy     Diabetes Mother      Breast Cancer Paternal Grandmother      Pancreatic Cancer Paternal Grandmother      Cardiovascular Paternal Aunt      Cardiovascular Paternal Uncle      Depression Other      Alcoholism Father      Thyroid Disease Sister      Alcoholism Sister      Hypertension Maternal Grandmother      Heart Disease Sister         multiple ablations     Alcoholism Sister      Prostate Cancer Paternal Grandfather        Social History     Tobacco Use     Smoking status: Current Every Day Smoker     Packs/day: 1.00     Years: 33.00     Pack years: 33.00     Types: Cigarettes     Start date: 1/1/1982     Smokeless tobacco: Never Used     Tobacco comment: maybe   Substance Use Topics     Alcohol use: Yes     Alcohol/week: 0.0 oz     Comment: occassional       Allergies   Allergen Reactions     Adhesive Tape Blisters      "Erythromycin Rash         ROS:   CONSTITUTIONAL:No report of fever, chills,  or change in weight  RESPIRATORY: No cough, wheezing, SOB, or hemoptysis  CARDIOVASCULAR: see HPI  MUSCULO-SKELETAL: No joint pain/swelling, no muslce pain  NEURO: No paresthesias, syncope, pre-syncope, light headness, dizziness or vertigo  ENDOCRINE: No temperature intolerance, no skin/hair changes  PSYCHIATRIC: No change in mood, feeling down/anxious, no change in sleep or appetite  GI: no melena or hematochezia, no change in bowel habits  : no hematuria or dysurea, no hesitancy, dribbling or incontinence  HEME: no easy bruising or bleeding, no history of anemia, no history of blood clots  SKIN: no rashes or sores, no unusual itching        Physical Examination:  Vitals: /85 (BP Location: Right arm, Patient Position: Chair, Cuff Size: Adult Regular)   Pulse 91   Ht 1.676 m (5' 6\")   Wt (!) 195 kg (430 lb)   SpO2 97%   BMI 69.40 kg/m     BMI= Body mass index is 69.4 kg/m .    GENERAL APPEARANCE: morbid obese, alert and no distress  HEENT: no icterus, no xanthelasmas, normal palate, mucosa moist, no cyanosis.  NECK: no adenopathy, no asymmetry,  JVP is not visible  CHEST: lungs clear to auscultation - no rales, rhonchi or wheezes, no use of accessory muscles, no retractions, respirations are unlabored, normal respiratory rate,   CARDIOVASCULAR: regular rhythm, normal S1 with physiologic split S2, no S3 or S4 and no murmur, click or rub, precordium quiet with normal PMI.  ABDOMEN: soft, non tender, without hepatosplenomegaly, obese  EXTREMITIES: no clubbing, cyanosis or edema  NEURO: alert and oriented to person/place/time, normal speech, gait and affect  VASC: Radial,and posterior tibialis pulses are normal in volumes and symmetric bilaterally.   SKIN: no ecchymoses, no rashes      Laboratory:  ECG: Sinus rhythm with sinus arrhythmia. At 96bpm.       Sleep Study Pause.    There is slowing of both AV conduction (AVB) and of the " "underlining sinus rate consistent with vagal mediated event.      Assessment and recommendations:  Shira Rodriguez is a 57yo F with morbid obesity complicated by HTN and CYRUS who is presenting with nocturnal bradycardia while at sleep. Longest episode lasting approx. 8sec. Review of episode during sleep study is consistent with vagal mediated event as there was slowing both of the sinus and AV nodes. Current guidelines recommend against PPM placement for nocturnal bradycardia. (J Am Rivera Cardiol. 2018 Nov 6. pii: -[id number]. doi: 10.1016/j.jacc.2018.10.044. )    # Nocturnal Bradycardia  -- Sleep Study for CPAP optimization  -- Continue event monitor to see if day time \"sleep episodes\" correlate with bradycardia  -- Avoid temi blocking agents unless strong indication    I appreciate the chance to help with Mrs. Rodriguez care. Please let me know if you have any questions or concerns.    Patient seen and discussed with Dr. Irwin.     Bassam Suggs MD  Cardiology Fellow  [phone]    Attending: Patient seen and examined with Dr. Suggs. The history and physical findings are accurate as recorded. My additional findings, if any, have been incorporated into the body of the note. All labs, imaging studies, ECG and telemetry data have been reviewed personally. The assessment and recommendations outlined reflect our joint decision making.    While quite striking pauses during sleep, including AV block, are not considered an indication for pacing.  They are considered indication for evaluation for sleep apnea, which is already being done.    Portions of the note were dictated using speech recognition software. The note has been reviewed but some errors may have been overlooked.    Law Irwin MD  "

## 2019-04-22 NOTE — PROGRESS NOTES
"      Clinical Cardiac Electrophysiology    Chief Complaint: Prolong Pause    HPI:   Shira Rodriguez is a 59yo F with morbid obesity complicated by HTN and CYRUS who is presenting for evaluation pauses noted during a sleep study. Patient recently underwent sleep study to reassess need for CPAP/adjustments due to increase day time sleepiness. Around 4am during sleep study patient describes waking up suddenly from sleep with the need to take off CPAP. Further analysis of that episode revealed patients HR had slowed down significantly, with a pause lasting ~8sec. PAtient denies any day time dizzyness or lightheadedness. No palpitations, chest pain, leg swelling, PND or orthopnea. She does report some dyspnea with exertion for which she is going to see a pulmonologist for. After this episode patient was placed on a event monitor which revealed pauses during sleep lasting up to 3 sec.     Current Outpatient Medications   Medication Sig Dispense Refill     DULoxetine (CYMBALTA) 60 MG EC capsule TK ONE C PO  QAM  0     folic acid (FOLVITE) 1 MG tablet Take 4 tablets (4 mg) by mouth daily Take 3-4 tablets daily 360 tablet 3     insulin syringe-needle U-100 (BD INSULIN SYRINGE ULTRAFINE) 30G X 1/2\" 1 ML For methotrexate use weekly 8 each prn     leucovorin (WELLCOVORIN) 5 MG tablet Take 1 tablet (5 mg) by mouth every 7 days *take 24h after taking weekly methotrexate dose 4 tablet 3     lisinopril (PRINIVIL/ZESTRIL) 10 MG tablet Take 1 tablet (10 mg) by mouth daily 90 tablet 3     methotrexate 50 MG/2ML injection CHEMO Inject 1 mL (25 mg) Subcutaneous every 7 days Inject 0.8 mL (20 mg) weekly 4 mL 4     methylPREDNISolone (MEDROL DOSEPAK) 4 MG tablet Follow package instructions 21 tablet 0     spironolactone (ALDACTONE) 25 MG tablet Take 1 tablet (25 mg) by mouth daily 90 tablet 3       Past Medical History:   Diagnosis Date     Chronic bronchitis (H) 07/30/2015     Contact dermatitis      Depressive disorder 1985     Eczema  "    HANDS     Elevated liver enzymes      H/O total knee replacement, left      History of total hip replacement 10/27/2011     Hyperlipidemia      Hypertension      Morbid obesity (H)      Osteoarthrosis     right knee     Sleep apnea      Tobacco use disorder        Past Surgical History:   Procedure Laterality Date     ARTHROPLASTY KNEE  6/14/2012    Procedure: ARTHROPLASTY KNEE;  Left Total Knee Arthroplasty;  Surgeon: Annette Quesada MD;  Location: [facility]     ARTHROSCOPY HIP Right      C TOTAL HIP ARTHROPLASTY  07/09/04    RT       Family History   Problem Relation Age of Onset     Thyroid Disease Mother      Hypertension Mother      Skin Cancer Mother         MMohs surgery with skin graft     Cerebrovascular Disease Mother         CVA after endarderectomy     Diabetes Mother      Breast Cancer Paternal Grandmother      Pancreatic Cancer Paternal Grandmother      Cardiovascular Paternal Aunt      Cardiovascular Paternal Uncle      Depression Other      Alcoholism Father      Thyroid Disease Sister      Alcoholism Sister      Hypertension Maternal Grandmother      Heart Disease Sister         multiple ablations     Alcoholism Sister      Prostate Cancer Paternal Grandfather        Social History     Tobacco Use     Smoking status: Current Every Day Smoker     Packs/day: 1.00     Years: 33.00     Pack years: 33.00     Types: Cigarettes     Start date: 1/1/1982     Smokeless tobacco: Never Used     Tobacco comment: maybe   Substance Use Topics     Alcohol use: Yes     Alcohol/week: 0.0 oz     Comment: occassional       Allergies   Allergen Reactions     Adhesive Tape Blisters     Erythromycin Rash         ROS:   CONSTITUTIONAL:No report of fever, chills,  or change in weight  RESPIRATORY: No cough, wheezing, SOB, or hemoptysis  CARDIOVASCULAR: see HPI  MUSCULO-SKELETAL: No joint pain/swelling, no muslce pain  NEURO: No paresthesias, syncope, pre-syncope, light headness, dizziness or vertigo  ENDOCRINE: No  "temperature intolerance, no skin/hair changes  PSYCHIATRIC: No change in mood, feeling down/anxious, no change in sleep or appetite  GI: no melena or hematochezia, no change in bowel habits  : no hematuria or dysurea, no hesitancy, dribbling or incontinence  HEME: no easy bruising or bleeding, no history of anemia, no history of blood clots  SKIN: no rashes or sores, no unusual itching        Physical Examination:  Vitals: /85 (BP Location: Right arm, Patient Position: Chair, Cuff Size: Adult Regular)   Pulse 91   Ht 1.676 m (5' 6\")   Wt (!) 195 kg (430 lb)   SpO2 97%   BMI 69.40 kg/m    BMI= Body mass index is 69.4 kg/m .    GENERAL APPEARANCE: morbid obese, alert and no distress  HEENT: no icterus, no xanthelasmas, normal palate, mucosa moist, no cyanosis.  NECK: no adenopathy, no asymmetry,  JVP is not visible  CHEST: lungs clear to auscultation - no rales, rhonchi or wheezes, no use of accessory muscles, no retractions, respirations are unlabored, normal respiratory rate,   CARDIOVASCULAR: regular rhythm, normal S1 with physiologic split S2, no S3 or S4 and no murmur, click or rub, precordium quiet with normal PMI.  ABDOMEN: soft, non tender, without hepatosplenomegaly, obese  EXTREMITIES: no clubbing, cyanosis or edema  NEURO: alert and oriented to person/place/time, normal speech, gait and affect  VASC: Radial,and posterior tibialis pulses are normal in volumes and symmetric bilaterally.   SKIN: no ecchymoses, no rashes      Laboratory:  ECG: Sinus rhythm with sinus arrhythmia. At 96bpm.       Sleep Study Pause.    There is slowing of both AV conduction (AVB) and of the underlining sinus rate consistent with vagal mediated event.      Assessment and recommendations:  Shira Rodriguez is a 57yo F with morbid obesity complicated by HTN and CYRUS who is presenting with nocturnal bradycardia while at sleep. Longest episode lasting approx. 8sec. Review of episode during sleep study is consistent with " "vagal mediated event as there was slowing both of the sinus and AV nodes. Current guidelines recommend against PPM placement for nocturnal bradycardia. (J Am Rivera Cardiol. 2018 Nov 6. pii: -[id number]. doi: 10.1016/j.jacc.2018.10.044. )    # Nocturnal Bradycardia  -- Sleep Study for CPAP optimization  -- Continue event monitor to see if day time \"sleep episodes\" correlate with bradycardia  -- Avoid temi blocking agents unless strong indication    I appreciate the chance to help with Mrs. Rodriguez care. Please let me know if you have any questions or concerns.    Patient seen and discussed with Dr. Irwin.     Bassam Suggs MD  Cardiology Fellow  [phone]    Attending: Patient seen and examined with Dr. Suggs. The history and physical findings are accurate as recorded. My additional findings, if any, have been incorporated into the body of the note. All labs, imaging studies, ECG and telemetry data have been reviewed personally. The assessment and recommendations outlined reflect our joint decision making.    While quite striking pauses during sleep, including AV block, are not considered an indication for pacing.  They are considered indication for evaluation for sleep apnea, which is already being done.    Portions of the note were dictated using speech recognition software. The note has been reviewed but some errors may have been overlooked.    Law Irwin MD      "

## 2019-04-22 NOTE — PROGRESS NOTES
Called patient with reports from [facility] regarding 3-1.3 second pauses that occur mostly 4-6 am in the morning.  Let her know that it is we would be making a referral to the electrophysiologists.

## 2019-04-23 ENCOUNTER — OFFICE VISIT (OUTPATIENT)
Dept: PULMONOLOGY | Facility: CLINIC | Age: 59
End: 2019-04-23
Attending: INTERNAL MEDICINE
Payer: COMMERCIAL

## 2019-04-23 VITALS
HEART RATE: 100 BPM | OXYGEN SATURATION: 95 % | WEIGHT: 293 LBS | SYSTOLIC BLOOD PRESSURE: 150 MMHG | RESPIRATION RATE: 18 BRPM | HEIGHT: 66 IN | DIASTOLIC BLOOD PRESSURE: 79 MMHG | BODY MASS INDEX: 47.09 KG/M2

## 2019-04-23 DIAGNOSIS — Z71.6 TOBACCO ABUSE COUNSELING: ICD-10-CM

## 2019-04-23 DIAGNOSIS — I10 ESSENTIAL HYPERTENSION: ICD-10-CM

## 2019-04-23 DIAGNOSIS — Z79.631 METHOTREXATE, LONG TERM, CURRENT USE: Primary | ICD-10-CM

## 2019-04-23 DIAGNOSIS — M05.742 RHEUMATOID ARTHRITIS INVOLVING BOTH HANDS WITH POSITIVE RHEUMATOID FACTOR (H): ICD-10-CM

## 2019-04-23 DIAGNOSIS — M05.741 RHEUMATOID ARTHRITIS INVOLVING BOTH HANDS WITH POSITIVE RHEUMATOID FACTOR (H): ICD-10-CM

## 2019-04-23 DIAGNOSIS — R00.1 BRADYCARDIA: ICD-10-CM

## 2019-04-23 DIAGNOSIS — F33.1 MAJOR DEPRESSIVE DISORDER, RECURRENT EPISODE, MODERATE (H): Primary | ICD-10-CM

## 2019-04-23 DIAGNOSIS — Z72.0 TOBACCO ABUSE: ICD-10-CM

## 2019-04-23 DIAGNOSIS — R06.02 SHORTNESS OF BREATH: ICD-10-CM

## 2019-04-23 LAB
ALBUMIN SERPL-MCNC: 3.3 G/DL (ref 3.4–5)
ALP SERPL-CCNC: 70 U/L (ref 40–150)
ALT SERPL W P-5'-P-CCNC: 31 U/L (ref 0–50)
ANION GAP SERPL CALCULATED.3IONS-SCNC: 6 MMOL/L (ref 3–14)
AST SERPL W P-5'-P-CCNC: 17 U/L (ref 0–45)
BASOPHILS # BLD AUTO: 0 10E9/L (ref 0–0.2)
BASOPHILS NFR BLD AUTO: 0.4 %
BILIRUB SERPL-MCNC: 0.3 MG/DL (ref 0.2–1.3)
BUN SERPL-MCNC: 13 MG/DL (ref 7–30)
CALCIUM SERPL-MCNC: 10 MG/DL (ref 8.5–10.1)
CHLORIDE SERPL-SCNC: 105 MMOL/L (ref 94–109)
CHOLEST SERPL-MCNC: 174 MG/DL
CO2 SERPL-SCNC: 30 MMOL/L (ref 20–32)
CREAT SERPL-MCNC: 0.67 MG/DL (ref 0.52–1.04)
DIFFERENTIAL METHOD BLD: ABNORMAL
EOSINOPHIL # BLD AUTO: 0.4 10E9/L (ref 0–0.7)
EOSINOPHIL NFR BLD AUTO: 3.9 %
ERYTHROCYTE [DISTWIDTH] IN BLOOD BY AUTOMATED COUNT: 14.4 % (ref 10–15)
GFR SERPL CREATININE-BSD FRML MDRD: >90 ML/MIN/{1.73_M2}
GLUCOSE SERPL-MCNC: 105 MG/DL (ref 70–99)
HBA1C MFR BLD: 5.7 % (ref 0–5.6)
HCT VFR BLD AUTO: 49.7 % (ref 35–47)
HDLC SERPL-MCNC: 51 MG/DL
HGB BLD-MCNC: 16.1 G/DL (ref 11.7–15.7)
IMM GRANULOCYTES # BLD: 0.1 10E9/L (ref 0–0.4)
IMM GRANULOCYTES NFR BLD: 0.5 %
LDLC SERPL CALC-MCNC: 97 MG/DL
LYMPHOCYTES # BLD AUTO: 1.7 10E9/L (ref 0.8–5.3)
LYMPHOCYTES NFR BLD AUTO: 17.7 %
MCH RBC QN AUTO: 31.4 PG (ref 26.5–33)
MCHC RBC AUTO-ENTMCNC: 32.4 G/DL (ref 31.5–36.5)
MCV RBC AUTO: 97 FL (ref 78–100)
MONOCYTES # BLD AUTO: 0.4 10E9/L (ref 0–1.3)
MONOCYTES NFR BLD AUTO: 4.4 %
NEUTROPHILS # BLD AUTO: 6.9 10E9/L (ref 1.6–8.3)
NEUTROPHILS NFR BLD AUTO: 73.1 %
NONHDLC SERPL-MCNC: 123 MG/DL
NRBC # BLD AUTO: 0 10*3/UL
NRBC BLD AUTO-RTO: 0 /100
PLATELET # BLD AUTO: 259 10E9/L (ref 150–450)
POTASSIUM SERPL-SCNC: 4.7 MMOL/L (ref 3.4–5.3)
PROT SERPL-MCNC: 7.3 G/DL (ref 6.8–8.8)
RBC # BLD AUTO: 5.12 10E12/L (ref 3.8–5.2)
SODIUM SERPL-SCNC: 141 MMOL/L (ref 133–144)
T4 FREE SERPL-MCNC: 1.02 NG/DL (ref 0.76–1.46)
TRIGL SERPL-MCNC: 128 MG/DL
TSH SERPL DL<=0.005 MIU/L-ACNC: 1.81 MU/L (ref 0.4–4)
WBC # BLD AUTO: 9.5 10E9/L (ref 4–11)

## 2019-04-23 PROCEDURE — 36415 COLL VENOUS BLD VENIPUNCTURE: CPT | Performed by: NURSE PRACTITIONER

## 2019-04-23 PROCEDURE — G0463 HOSPITAL OUTPT CLINIC VISIT: HCPCS | Mod: ZF

## 2019-04-23 PROCEDURE — 85025 COMPLETE CBC W/AUTO DIFF WBC: CPT | Performed by: NURSE PRACTITIONER

## 2019-04-23 PROCEDURE — 84439 ASSAY OF FREE THYROXINE: CPT | Performed by: NURSE PRACTITIONER

## 2019-04-23 PROCEDURE — 80061 LIPID PANEL: CPT | Performed by: NURSE PRACTITIONER

## 2019-04-23 PROCEDURE — 84443 ASSAY THYROID STIM HORMONE: CPT | Performed by: NURSE PRACTITIONER

## 2019-04-23 PROCEDURE — 80053 COMPREHEN METABOLIC PANEL: CPT | Performed by: NURSE PRACTITIONER

## 2019-04-23 PROCEDURE — 83036 HEMOGLOBIN GLYCOSYLATED A1C: CPT | Performed by: NURSE PRACTITIONER

## 2019-04-23 ASSESSMENT — MIFFLIN-ST. JEOR: SCORE: 2546.5

## 2019-04-23 ASSESSMENT — PAIN SCALES - GENERAL: PAINLEVEL: NO PAIN (0)

## 2019-04-23 NOTE — PATIENT INSTRUCTIONS
I recommend daily antihistamine over the counter, If you don't notice improvement in a week or two, let me know and we will start an inhaler.     I'd like you to get breathing test soon.    HOW TO QUIT SMOKING  Smoking is one of the hardest habits to break. About half of all those who have ever smoked have been able to quit, and most of those (about 70%) who still smoke want to quit. Here are some of the best ways to stop smoking.     KEEP TRYING:  It takes most smokers about 8 tries before they are finally able to fully quit. So, the more often you try and fail, the better your chance of quitting the next time! So, don't give up!    GO COLD TURKEY:  Most ex-smokers quit cold turkey. Trying to cut back gradually doesn't seem to work as well, perhaps because it continues the smoking habit. Also, it is possible to fool yourself by inhaling more while smoking fewer cigarettes. This results in the same amount of nicotine in your body!    GET SUPPORT:  Support programs can make an important difference, especially for the heavy smoker. These groups offer lectures, methods to change your behavior and peer support. Call the free national Quitline for more information. 800-QUIT-NOW (315-254-1215). Low-cost or free programs are offered by many hospitals, local chapters of the American [employer] Association (758-386-9800) and the [employer] Cancer Society (674-833-4971). Support at home is important too. Non-smokers can help by offering praise and encouragement. If the smoker fails to quit, encourage them to try again!    OVER-THE-COUNTER MEDICINES:  For those who can't quit on their own, Nicotine Replacement Therapy (NRT) may make quitting much easier. Certain aids such as the nicotine patch, gum and lozenge are available without a prescription. However, it is best to use these under the guidance of your doctor. The skin patch provides a steady supply of nicotine to the body. Nicotine gum and lozenge gives temporary bursts of low  levels of nicotine. Both methods take the edge off the craving for cigarettes. WARNING: If you feel symptoms of nicotine overdose, such as nausea, vomiting, dizziness, weakness, or fast heartbeat, stop using these and see your doctor.    PRESCRIPTION MEDICINES:  After evaluating your smoking patterns and prior attempts at quitting, your doctor may offer a prescription medicine such as bupropion (Zyban, Wellbutrin), varenicline (Chantix, Champix), a niocotine inhaler or nasal spray. Each has its unique advantage and side effects which your doctor can review with you.

## 2019-04-23 NOTE — LETTER
"4/23/2019       RE: Shira Rodriguez  [id number] [address] [address]     Dear Colleague,    Thank you for referring your patient, Shira Rodriguez, to the [facility] at [facility]. Please see a copy of my visit note below.    Reason for Visit  Shira Rodriguez is a 59 year old female who is followed for dyspnea, nicotine dependence  Pulmonary HPI  I haven't seen Shira in a couple of years. She wants to address these issues today:  1. Severe CYRUS - has been working to treat it. She saw EP due to asystolic pause noted during  2. She is experiencing more breathlessness, mucus production. A spontaneous and sudden flood of mucus with cough and catching the breath. Happens at home and at work.    3. She has not quit smoking but really wants to. She took Wellbutrin, but it made her cry all the time. Smoking a pack per day right now.     The patient was seen and examined by Georgina Richardson MD   Current Outpatient Medications   Medication     DULoxetine (CYMBALTA) 60 MG EC capsule     folic acid (FOLVITE) 1 MG tablet     insulin syringe-needle U-100 (BD INSULIN SYRINGE ULTRAFINE) 30G X 1/2\" 1 ML     leucovorin (WELLCOVORIN) 5 MG tablet     lisinopril (PRINIVIL/ZESTRIL) 10 MG tablet     methotrexate 50 MG/2ML injection CHEMO     methylPREDNISolone (MEDROL DOSEPAK) 4 MG tablet     spironolactone (ALDACTONE) 25 MG tablet     Current Facility-Administered Medications   Medication     lidocaine (PF) (XYLOCAINE) 1 % injection 50 mg     methylPREDNISolone acetate (DEPO-MEDROL) injection 40 mg     Allergies   Allergen Reactions     Adhesive Tape Blisters     Erythromycin Rash     Social History     Socioeconomic History     Marital status: Single     Spouse name: Not on file     Number of children: Not on file     Years of education: Not on file     Highest education level: Not on file   Occupational History     Occupation: registered " nurse     Comment: [employer]   Social Needs     Financial resource strain: Not on file     Food insecurity:     Worry: Not on file     Inability: Not on file     Transportation needs:     Medical: Not on file     Non-medical: Not on file   Tobacco Use     Smoking status: Current Every Day Smoker     Packs/day: 1.00     Years: 33.00     Pack years: 33.00     Types: Cigarettes     Start date: 1/1/1982     Smokeless tobacco: Never Used     Tobacco comment: maybe   Substance and Sexual Activity     Alcohol use: Yes     Alcohol/week: 0.0 oz     Comment: occassional     Drug use: No     Sexual activity: Never   Lifestyle     Physical activity:     Days per week: Not on file     Minutes per session: Not on file     Stress: Not on file   Relationships     Social connections:     Talks on phone: Not on file     Gets together: Not on file     Attends Mormon service: Not on file     Active member of club or organization: Not on file     Attends meetings of clubs or organizations: Not on file     Relationship status: Not on file     Intimate partner violence:     Fear of current or ex partner: Not on file     Emotionally abused: Not on file     Physically abused: Not on file     Forced sexual activity: Not on file   Other Topics Concern     Parent/sibling w/ CABG, MI or angioplasty before 65F 55M? Not Asked   Social History Narrative     Not on file     Past Medical History:   Diagnosis Date     Chronic bronchitis (H) 07/30/2015     Contact dermatitis      Depressive disorder 1985     Eczema     HANDS     Elevated liver enzymes      H/O total knee replacement, left      History of total hip replacement 10/27/2011     Hyperlipidemia      Hypertension      Morbid obesity (H)      Osteoarthrosis     right knee     Sleep apnea      Tobacco use disorder      Past Surgical History:   Procedure Laterality Date     ARTHROPLASTY KNEE  6/14/2012    Procedure: ARTHROPLASTY KNEE;  Left Total Knee Arthroplasty;  Surgeon: Sangita  "Annette BRIDGES MD;  Location: [facility] OR     ARTHROSCOPY HIP Right      C TOTAL HIP ARTHROPLASTY  07/09/04    RT     Family History   Problem Relation Age of Onset     Thyroid Disease Mother      Hypertension Mother      Skin Cancer Mother         MMohs surgery with skin graft     Cerebrovascular Disease Mother         CVA after endarderectomy     Diabetes Mother      Breast Cancer Paternal Grandmother      Pancreatic Cancer Paternal Grandmother      Cardiovascular Paternal Aunt      Cardiovascular Paternal Uncle      Depression Other      Alcoholism Father      Thyroid Disease Sister      Alcoholism Sister      Hypertension Maternal Grandmother      Heart Disease Sister         multiple ablations     Alcoholism Sister      Prostate Cancer Paternal Grandfather      ROS Pulmonary  Dyspnea: Yes, Cough: Yes, Chest pain: No, Wheezing: Yes, Sputum Production: Yes, Hemoptysis: No  A complete ROS was otherwise negative except as noted in the HPI.  /79 (BP Location: Right arm, Patient Position: Chair, Cuff Size: Adult Large)   Pulse 100   Resp 18   Ht 1.676 m (5' 5.98\")   Wt (!) 195 kg (429 lb 14.4 oz)   SpO2 95%   BMI 69.42 kg/m     Exam:   GENERAL APPEARANCE: Well developed, well nourished, alert, and in no apparent distress.  EYES: PERRL, EOMI  HENT: Nasal mucosa with no edema and no hyperemia. No nasal polyps.  EARS: Canals clear, TMs normal  MOUTH: Oral mucosa is moist, without any lesions, no tonsillar enlargement, no oropharyngeal exudate.  NECK: supple, no masses, no thyromegaly.  LYMPHATICS: No significant axillary, cervical, or supraclavicular nodes.  RESP: normal percussion, good air flow throughout.  No crackles. No rhonchi. No wheezes.  CV: Normal S1, S2, regular rhythm, normal rate. No murmur.  No rub. No gallop. No LE edema.   ABDOMEN:  Bowel sounds normal, soft, nontender, no HSM or masses.   MS: right leg with swollen and hard hematoma; no induration or erythema. No clubbing. No cyanosis.  SKIN: no " rash on limited exam  NEURO: Mentation intact, speech normal, normal strength and tone, normal gait and stance  PSYCH: mentation appears normal. and affect normal/bright  Results:  PFTs 7/31/15 show mild ventilatory defect, borderline restriction with no evidence of obstruction, normal diffusion, no bronchodilator change  CXR 9/21/15 clear lung fields, no hyperexpansion    Assessment and plan: Shira is a 56-year-old female who is seen for chronic cough.  She is a smoker and she is morbidly obese.  She also has obstructive sleep apnea that was diagnosed 10 years ago but only recently being treated.  She has PFTs that are mildly abnormal and likely related to extrinsic restriction from her body habitus.      1.  Dyspnea - PFTs borderline normal in 2015. Multifactorial with morbid obesity, chronic bronchitis.     2. Chronic bronchitis with periodic excessive production.    Antihistamine trial, consider ICS/LABA (previously I prescribed but she never took and isn't sure why)   May improve with smoking cessation, which she is ready to act on.    2.  Nicotine dependence.  I prescribed her Chantix today, provided Quitting for Good Workbook    3.  Obstructive sleep apnea.  She underwent split-night sleep study in Jan 2017 and is now still adjusting PAP therapy, needs a titration study.    Total visit time 45, over 50% of which (35 minutes) was spent in counseling and/or coordination of care. Primarily discussing weight loss, smoking cessation and how not to let each sabotage the other.     Again, thank you for allowing me to participate in the care of your patient.      Sincerely,    Georgina Richardson MD

## 2019-04-23 NOTE — NURSING NOTE
Chief Complaint   Patient presents with     RECHECK     follow up      Tami Gaines CMA   pt did not have colonoscopy done yet, contact information has been added to AVS.

## 2019-04-23 NOTE — PROGRESS NOTES
"Reason for Visit  Shira Rodriguez is a 59 year old female who is followed for dyspnea, nicotine dependence  Pulmonary HPI  I haven't seen Shira in a couple of years. She wants to address these issues today:  1. Severe CYRUS - has been working to treat it. She saw EP due to asystolic pause noted during  2. She is experiencing more breathlessness, mucus production. A spontaneous and sudden flood of mucus with cough and catching the breath. Happens at home and at work.    3. She has not quit smoking but really wants to. She took Wellbutrin, but it made her cry all the time. Smoking a pack per day right now.     The patient was seen and examined by Georgina Richardson MD   Current Outpatient Medications   Medication     DULoxetine (CYMBALTA) 60 MG EC capsule     folic acid (FOLVITE) 1 MG tablet     insulin syringe-needle U-100 (BD INSULIN SYRINGE ULTRAFINE) 30G X 1/2\" 1 ML     leucovorin (WELLCOVORIN) 5 MG tablet     lisinopril (PRINIVIL/ZESTRIL) 10 MG tablet     methotrexate 50 MG/2ML injection CHEMO     methylPREDNISolone (MEDROL DOSEPAK) 4 MG tablet     spironolactone (ALDACTONE) 25 MG tablet     Current Facility-Administered Medications   Medication     lidocaine (PF) (XYLOCAINE) 1 % injection 50 mg     methylPREDNISolone acetate (DEPO-MEDROL) injection 40 mg     Allergies   Allergen Reactions     Adhesive Tape Blisters     Erythromycin Rash     Social History     Socioeconomic History     Marital status: Single     Spouse name: Not on file     Number of children: Not on file     Years of education: Not on file     Highest education level: Not on file   Occupational History     Occupation: registered nurse     Comment: [employer]   Social Needs     Financial resource strain: Not on file     Food insecurity:     Worry: Not on file     Inability: Not on file     Transportation needs:     Medical: Not on file     Non-medical: Not on file   Tobacco Use     Smoking status: Current Every Day Smoker     Packs/day: 1.00    "  Years: 33.00     Pack years: 33.00     Types: Cigarettes     Start date: 1/1/1982     Smokeless tobacco: Never Used     Tobacco comment: maybe   Substance and Sexual Activity     Alcohol use: Yes     Alcohol/week: 0.0 oz     Comment: occassional     Drug use: No     Sexual activity: Never   Lifestyle     Physical activity:     Days per week: Not on file     Minutes per session: Not on file     Stress: Not on file   Relationships     Social connections:     Talks on phone: Not on file     Gets together: Not on file     Attends Buddhist service: Not on file     Active member of club or organization: Not on file     Attends meetings of clubs or organizations: Not on file     Relationship status: Not on file     Intimate partner violence:     Fear of current or ex partner: Not on file     Emotionally abused: Not on file     Physically abused: Not on file     Forced sexual activity: Not on file   Other Topics Concern     Parent/sibling w/ CABG, MI or angioplasty before 65F 55M? Not Asked   Social History Narrative     Not on file     Past Medical History:   Diagnosis Date     Chronic bronchitis (H) 07/30/2015     Contact dermatitis      Depressive disorder 1985     Eczema     HANDS     Elevated liver enzymes      H/O total knee replacement, left      History of total hip replacement 10/27/2011     Hyperlipidemia      Hypertension      Morbid obesity (H)      Osteoarthrosis     right knee     Sleep apnea      Tobacco use disorder      Past Surgical History:   Procedure Laterality Date     ARTHROPLASTY KNEE  6/14/2012    Procedure: ARTHROPLASTY KNEE;  Left Total Knee Arthroplasty;  Surgeon: Annette Quesada MD;  Location: [facility]     ARTHROSCOPY HIP Right      C TOTAL HIP ARTHROPLASTY  07/09/04    RT     Family History   Problem Relation Age of Onset     Thyroid Disease Mother      Hypertension Mother      Skin Cancer Mother         MMohs surgery with skin graft     Cerebrovascular Disease Mother         CVA after  "endarderectomy     Diabetes Mother      Breast Cancer Paternal Grandmother      Pancreatic Cancer Paternal Grandmother      Cardiovascular Paternal Aunt      Cardiovascular Paternal Uncle      Depression Other      Alcoholism Father      Thyroid Disease Sister      Alcoholism Sister      Hypertension Maternal Grandmother      Heart Disease Sister         multiple ablations     Alcoholism Sister      Prostate Cancer Paternal Grandfather      ROS Pulmonary  Dyspnea: Yes, Cough: Yes, Chest pain: No, Wheezing: Yes, Sputum Production: Yes, Hemoptysis: No  A complete ROS was otherwise negative except as noted in the HPI.  /79 (BP Location: Right arm, Patient Position: Chair, Cuff Size: Adult Large)   Pulse 100   Resp 18   Ht 1.676 m (5' 5.98\")   Wt (!) 195 kg (429 lb 14.4 oz)   SpO2 95%   BMI 69.42 kg/m    Exam:   GENERAL APPEARANCE: Well developed, well nourished, alert, and in no apparent distress.  EYES: PERRL, EOMI  HENT: Nasal mucosa with no edema and no hyperemia. No nasal polyps.  EARS: Canals clear, TMs normal  MOUTH: Oral mucosa is moist, without any lesions, no tonsillar enlargement, no oropharyngeal exudate.  NECK: supple, no masses, no thyromegaly.  LYMPHATICS: No significant axillary, cervical, or supraclavicular nodes.  RESP: normal percussion, good air flow throughout.  No crackles. No rhonchi. No wheezes.  CV: Normal S1, S2, regular rhythm, normal rate. No murmur.  No rub. No gallop. No LE edema.   ABDOMEN:  Bowel sounds normal, soft, nontender, no HSM or masses.   MS: right leg with swollen and hard hematoma; no induration or erythema. No clubbing. No cyanosis.  SKIN: no rash on limited exam  NEURO: Mentation intact, speech normal, normal strength and tone, normal gait and stance  PSYCH: mentation appears normal. and affect normal/bright  Results:  PFTs 7/31/15 show mild ventilatory defect, borderline restriction with no evidence of obstruction, normal diffusion, no bronchodilator change  CXR " 9/21/15 clear lung fields, no hyperexpansion    Assessment and plan: Shira is a 56-year-old female who is seen for chronic cough.  She is a smoker and she is morbidly obese.  She also has obstructive sleep apnea that was diagnosed 10 years ago but only recently being treated.  She has PFTs that are mildly abnormal and likely related to extrinsic restriction from her body habitus.      1.  Dyspnea - PFTs borderline normal in 2015. Multifactorial with morbid obesity, chronic bronchitis.     2. Chronic bronchitis with periodic excessive production.    Antihistamine trial, consider ICS/LABA (previously I prescribed but she never took and isn't sure why)   May improve with smoking cessation, which she is ready to act on.    2.  Nicotine dependence.  I prescribed her Chantix today, provided Quitting for Good Workbook    3.  Obstructive sleep apnea.  She underwent split-night sleep study in Jan 2017 and is now still adjusting PAP therapy, needs a titration study.    Total visit time 45, over 50% of which (35 minutes) was spent in counseling and/or coordination of care. Primarily discussing weight loss, smoking cessation and how not to let each sabotage the other.

## 2019-04-29 ENCOUNTER — OFFICE VISIT (OUTPATIENT)
Dept: SLEEP MEDICINE | Facility: CLINIC | Age: 59
End: 2019-04-29
Payer: COMMERCIAL

## 2019-04-29 VITALS
HEART RATE: 103 BPM | SYSTOLIC BLOOD PRESSURE: 137 MMHG | RESPIRATION RATE: 16 BRPM | WEIGHT: 293 LBS | BODY MASS INDEX: 47.09 KG/M2 | OXYGEN SATURATION: 95 % | HEIGHT: 66 IN | DIASTOLIC BLOOD PRESSURE: 94 MMHG

## 2019-04-29 DIAGNOSIS — G47.33 OSA (OBSTRUCTIVE SLEEP APNEA): Primary | ICD-10-CM

## 2019-04-29 PROCEDURE — 99214 OFFICE O/P EST MOD 30 MIN: CPT | Performed by: PSYCHIATRY & NEUROLOGY

## 2019-04-29 ASSESSMENT — MIFFLIN-ST. JEOR: SCORE: 2542.68

## 2019-04-29 NOTE — NURSING NOTE
"Chief Complaint   Patient presents with     Study Results     PSG f/u       Initial BP (!) 163/107   Pulse 103   Resp 16   Ht 1.676 m (5' 6\")   Wt (!) 194.6 kg (429 lb)   SpO2 95%   BMI 69.24 kg/m   Estimated body mass index is 69.24 kg/m  as calculated from the following:    Height as of this encounter: 1.676 m (5' 6\").    Weight as of this encounter: 194.6 kg (429 lb).    Medication Reconciliation: complete     ESS 14  Geogrette Gupta      "

## 2019-04-29 NOTE — PATIENT INSTRUCTIONS
Your BMI is Body mass index is 69.24 kg/m .  Weight management is a personal decision.  If you are interested in exploring weight loss strategies, the following discussion covers the approaches that may be successful. Body mass index (BMI) is one way to tell whether you are at a healthy weight, overweight, or obese. It measures your weight in relation to your height.  A BMI of 18.5 to 24.9 is in the healthy range. A person with a BMI of 25 to 29.9 is considered overweight, and someone with a BMI of 30 or greater is considered obese. More than two-thirds of American adults are considered overweight or obese.  Being overweight or obese increases the risk for further weight gain. Excess weight may lead to heart disease and diabetes.  Creating and following plans for healthy eating and physical activity may help you improve your health.  Weight control is part of healthy lifestyle and includes exercise, emotional health, and healthy eating habits. Careful eating habits lifelong are the mainstay of weight control. Though there are significant health benefits from weight loss, long-term weight loss with diet alone may be very difficult to achieve- studies show long-term success with dietary management in less than 10% of people. Attaining a healthy weight may be especially difficult to achieve in those with severe obesity. In some cases, medications, devices and surgical management might be considered.  What can you do?  If you are overweight or obese and are interested in methods for weight loss, you should discuss this with your provider.     Consider reducing daily calorie intake by 500 calories.     Keep a food journal.     Avoiding skipping meals, consider cutting portions instead.    Diet combined with exercise helps maintain muscle while optimizing fat loss. Strength training is particularly important for building and maintaining muscle mass. Exercise helps reduce stress, increase energy, and improves fitness.  Increasing exercise without diet control, however, may not burn enough calories to loose weight.       Start walking three days a week 10-20 minutes at a time    Work towards walking thirty minutes five days a week     Eventually, increase the speed of your walking for 1-2 minutes at time    In addition, we recommend that you review healthy lifestyles and methods for weight loss available through the National Institutes of Health patient information sites:  http://win.niddk.nih.gov/publications/index.htm    And look into health and wellness programs that may be available through your health insurance provider, employer, local community center, or [employer].    Weight management plan: Patient was referred to their PCP to discuss a diet and exercise plan.

## 2019-04-30 DIAGNOSIS — G47.33 OBSTRUCTIVE SLEEP APNEA (ADULT) (PEDIATRIC): Primary | ICD-10-CM

## 2019-04-30 NOTE — PROGRESS NOTES
Visit Date:   04/29/2019      Ms. Rodriguez is 59 years old.  She has a history of sleep disordered breathing, previously treated with bilevel positive airway pressure therapy.  She underwent overnight polysomnogram to better characterize the best strategy to treat her sleep disordered breathing.  Her head of the bed was elevated, probably to about 30 degrees.  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  She felt good on just CPAP alone and was somewhat surprised that they did not switch her over to bilevel.  She did only have a short period of time in REM sleep and thus we are left with the quandary as to whether or not to go ahead and treat her with auto titration CPAP.  Based upon the study, we would probably recommend pressures between 9 and 17 cm of water pressure.  Conversely, based on the suspicion considering her morbid obesity that she may very well have obesity hypoventilation, we could go ahead and restart her back on bilevel positive airway pressure set between 15 and 9.  At this point; however, the patient's prior experience with bilevel was not good and her experience with CPAP during the study was quite good, and she is also very astute and able to make a reasoned choice in this situation, and after careful consideration we will go ahead and just start autotitration CPAP and followup accordingly.  If we need to, we can repeat a sleep study or switch her back over to bilevel positive air pressure therapy.      In addition to that, she did have a period without a heart rhythm somewhere between 5 and 8 seconds.  According to our study, we were concerned that it was 8 seconds long.  She did have a prolonged monitoring by her cardiologist who indicates that they are not seeing prolonged events like that.  We did discuss whether or not this is related to the fact that for a short period of time her sleep apnea was effectively treated  and thus when she enters into REM sleep she only has these events.  Ultimately, at this point, that is unknowable and it is a risk that she understands.  She is continuing to follow up with our colleagues not only in Cardiology, but also in Pulmonary Medicine, and she is again motivated to discontinue smoking, which is excellent.      She will follow up with our sleep therapy monitoring team.  We will go ahead and use the machine she already has and see if we can put that on auto titrating CPAP, and then she will follow up with not only sleep therapy management but also with myself in about 6 weeks.      The patient understands the plan.  It is a great privilege being asked to participate in her care.  She has been advised on the importance of never operating a motor vehicle if tired or sleepy.      Twenty-five minutes were spent with the patient today, greater than 50% of the time in counseling and coordination of care.         ANALISA LOVE MD             D: 2019   T: 2019   MT: WT      Name:     XAVIER ROWELL   MRN:      [id number]        Account:      [id number]   :      1960           Visit Date:   2019      Document: [id number]

## 2019-05-02 ENCOUNTER — DOCUMENTATION ONLY (OUTPATIENT)
Dept: SLEEP MEDICINE | Facility: CLINIC | Age: 59
End: 2019-05-02

## 2019-05-02 DIAGNOSIS — G47.33 OSA (OBSTRUCTIVE SLEEP APNEA): Primary | ICD-10-CM

## 2019-05-02 NOTE — PROGRESS NOTES
Patient came to [place] for mask fitting appointment on May 2, 2019. Patient requested to switch masks because general discomfort . Patient tried on the following masks: AirFit N20 Med. Patient selected a Resmed Mask name: AirFit N20 Nasal mask size Medium.    Patient has Resmed Aircurve machine and need pressures changed to 9-17 cm H2O.  Machine does not have auto cpap mode, only straight cpap, bilevel S, or auto bilevel.  Dr. Greenfield not available to speak at time of appointment.  Called and spoke to Bennett Goltz, PAC who recommended changing mode to Auto Bilevel  MIN EPAP-9 , MAX IPAP-17, AND Pressure support - 0.  These changes were made to patient's machine today.

## 2019-05-17 ENCOUNTER — TELEPHONE (OUTPATIENT)
Dept: ORTHOPEDICS | Facility: CLINIC | Age: 59
End: 2019-05-17

## 2019-05-17 DIAGNOSIS — M51.16 LUMBAR DISC HERNIATION WITH RADICULOPATHY: Primary | ICD-10-CM

## 2019-05-17 ASSESSMENT — ENCOUNTER SYMPTOMS
SINUS PAIN: 0
SINUS CONGESTION: 0
COUGH DISTURBING SLEEP: 1
POSTURAL DYSPNEA: 1
TASTE DISTURBANCE: 0
LIGHT-HEADEDNESS: 0
SLEEP DISTURBANCES DUE TO BREATHING: 0
COUGH: 1
TROUBLE SWALLOWING: 0
JAUNDICE: 0
NERVOUS/ANXIOUS: 1
CONSTIPATION: 1
STIFFNESS: 1
DEPRESSION: 1
SMELL DISTURBANCE: 0
HYPOTENSION: 0
DECREASED CONCENTRATION: 1
LEG PAIN: 1
BOWEL INCONTINENCE: 0
ORTHOPNEA: 1
MUSCLE WEAKNESS: 0
INSOMNIA: 1
HYPERTENSION: 0
BACK PAIN: 1
NECK PAIN: 0
RECTAL PAIN: 0
JOINT SWELLING: 0
SHORTNESS OF BREATH: 1
HOARSE VOICE: 0
ARTHRALGIAS: 1
NECK MASS: 1
DYSPNEA ON EXERTION: 1
BLOOD IN STOOL: 0
HEMOPTYSIS: 0
SPUTUM PRODUCTION: 1
DIARRHEA: 0
MUSCLE CRAMPS: 0
HEARTBURN: 0
PANIC: 0
PALPITATIONS: 0
ABDOMINAL PAIN: 0
EXERCISE INTOLERANCE: 1
NAUSEA: 0
SORE THROAT: 0
SYNCOPE: 0
SNORES LOUDLY: 1
BLOATING: 0
VOMITING: 0
MYALGIAS: 0
WHEEZING: 1

## 2019-05-17 NOTE — TELEPHONE ENCOUNTER
Called to inform pt that I faxed over her Pool therapy order [facility] with the fax number she provided. She was appreciative of the call .

## 2019-05-17 NOTE — TELEPHONE ENCOUNTER
Health Call Center    Phone Message    May a detailed message be left on voicemail: yes    Reason for Call: Order(s): Other: Pool therapy/PT order copy sent to [facility] at [phone]   Reason for requested:  pt is scheduled for Monday and they need the order  Date needed: ASAP today   Provider name: Dr Saxena      Action Taken: Message routed to:  [facility] ([facility] orthopedics

## 2019-05-19 NOTE — PROGRESS NOTES
HPI:   Ms. Kristel Rodriguez is a 56-year old lady with hypertension, morbid obesity, tobacco abuse, diastolic dysfunction, and nocturnal bradycardia who presents for follow up. At the patient's last visit, aldactone was added to her medical regimen. In addition, the patient saw EP to evaluate sinus pauses, which were found to be consistent with vagally mediated bradycardia during sleep.    Since the patient's last visit, she has had multiple noncardiac issues.  She reports significant back pain which is led to her need to reduce hours at work.  In addition, she was also falling asleep at work.  She eventually quit her job on Thursday.  Since quitting her job, she had not left her house until to come to this appointment.  She reports multiple different joint pains.  In addition, on Friday, she had substernal chest pain which was nonradiating.  It was intermittent throughout the day Friday.  It had some pleuritic symptoms.  After waking up on Saturday she had no pain.    She reports intermittent orthopnea, though it is unclear if it is related to her CPAP use.  She has not taken her Aldactone in the past 4 days and does not take her blood pressure at home.  She continues to smoke 1 pack/day and has plans to quit when her mother returns from vacation.    Cardiac meds:  - lisinopril 10 mg daily  - aldactone 25 mg daily      PAST MEDICAL HISTORY:  Past Medical History:   Diagnosis Date     Chronic bronchitis (H) 07/30/2015     Contact dermatitis      Depressive disorder 1985     Eczema     HANDS     Elevated liver enzymes      H/O total knee replacement, left      History of total hip replacement 10/27/2011     Hyperlipidemia      Hypertension      Morbid obesity (H)      Osteoarthrosis     right knee     Sleep apnea      Tobacco use disorder        CURRENT MEDICATIONS:  Current Outpatient Medications   Medication Sig Dispense Refill     DULoxetine (CYMBALTA) 60 MG EC capsule TK ONE C PO  QAM  0     folic acid (FOLVITE)  "1 MG tablet Take 4 tablets (4 mg) by mouth daily Take 3-4 tablets daily 360 tablet 3     insulin syringe-needle U-100 (BD INSULIN SYRINGE ULTRAFINE) 30G X 1/2\" 1 ML For methotrexate use weekly 8 each prn     leucovorin (WELLCOVORIN) 5 MG tablet Take 1 tablet (5 mg) by mouth every 7 days *take 24h after taking weekly methotrexate dose 4 tablet 3     lisinopril (PRINIVIL/ZESTRIL) 10 MG tablet Take 1 tablet (10 mg) by mouth daily 90 tablet 3     methotrexate 50 MG/2ML injection CHEMO Inject 1 mL (25 mg) Subcutaneous every 7 days Inject 0.8 mL (20 mg) weekly 4 mL 4     methylPREDNISolone (MEDROL DOSEPAK) 4 MG tablet Follow package instructions 21 tablet 0     spironolactone (ALDACTONE) 25 MG tablet Take 1 tablet (25 mg) by mouth daily 90 tablet 3     varenicline (CHANTIX STARTING MONTH ) 0.5 MG X 11 & 1 MG X 42 tablet Take 0.5 mg by mouth daily AND 0.5 mg 2 times daily AND 1 mg 2 times daily. 53 tablet 0       PAST SURGICAL HISTORY:  Past Surgical History:   Procedure Laterality Date     ARTHROPLASTY KNEE  2012    Procedure: ARTHROPLASTY KNEE;  Left Total Knee Arthroplasty;  Surgeon: Annette Quesada MD;  Location: [facility]     ARTHROSCOPY HIP Right      C TOTAL HIP ARTHROPLASTY  04    RT       ALLERGIES     Allergies   Allergen Reactions     Adhesive Tape Blisters     Erythromycin Rash       FAMILY HISTORY:  Father  when she was age 12. Potentially had MI   PGF with MI though ?age   Mother with HTN, TIA and stroke  Three paternal uncles with CABG, at least one in early 50s     SOCIAL HISTORY:  - Current smoker, smoked 30 years x 1 PPD  - Typically has less than one drink/month  - No illicit drugs      ROS:   A comprehensive 10 point ROS was completed and relevant findings are noted in the HPI.    EXAM:  /83 (BP Location: Right arm, Patient Position: Chair, Cuff Size: Adult Large)   Pulse 103   Ht 1.676 m (5' 6\")   Wt (!) 198 kg (436 lb 8 oz)   SpO2 93%   BMI 70.45 kg/m       My " recheck: 140/88 from forearm.     In general, the patient is a pleasant female in no apparent distress. Morbidly obese    HEENT: NC/AT.  PERRLA.  EOMI.  Sclerae white, not injected.    Neck: Carotids 2+ bilaterally without bruits.  No jugular venous distension.   Lymph: No cervical adenopathy. No thyromegaly.   Heart: tachycardic rate, regular rhythm. Normal S1, S2. No murmur, rub, click, or gallop. There is no heave.    Lungs: Clear bilaterally.  No rhonchi, wheezes, rales.   GI: Soft, nontender, nondistended.   Extremities: Trace edema.  The pulses are 2+at the radial and DP bilaterally.  Neuro: grossly non focal.   Skin: no rashes. Dusky discoloration b/l shins R>L  Endocrine: no thyromegaly  Musculoskeletal: no joint swelling  Psych: pleasant and conversant      Labs:  LIPID RESULTS:  Lab Results   Component Value Date    CHOL 174 04/23/2019    HDL 51 04/23/2019    LDL 97 04/23/2019    TRIG 128 04/23/2019    CHOLHDLRATIO 4.5 06/01/2012    NHDL 123 04/23/2019       LIVER ENZYME RESULTS:  Lab Results   Component Value Date    AST 17 04/23/2019    ALT 31 04/23/2019       CBC RESULTS:  Lab Results   Component Value Date    WBC 9.5 04/23/2019    RBC 5.12 04/23/2019    HGB 16.1 (H) 04/23/2019    HCT 49.7 (H) 04/23/2019    MCV 97 04/23/2019    MCH 31.4 04/23/2019    MCHC 32.4 04/23/2019    RDW 14.4 04/23/2019     04/23/2019       BMP RESULTS:  Lab Results   Component Value Date     04/23/2019    POTASSIUM 4.7 04/23/2019    CHLORIDE 105 04/23/2019    CO2 30 04/23/2019    ANIONGAP 6 04/23/2019     (H) 04/23/2019    BUN 13 04/23/2019    CR 0.67 04/23/2019    GFRESTIMATED >90 04/23/2019    GFRESTBLACK >90 04/23/2019    MARIA INES 10.0 04/23/2019        A1C RESULTS:  Lab Results   Component Value Date    A1C 5.7 (H) 04/23/2019       INR RESULTS:  Lab Results   Component Value Date    INR 1.01 04/20/2017    INR 1.31 (H) 06/18/2012       Procedures:    ECG today - NSR, inferior infarct which has been present on  multiple past EKGs        EKG 1/16/17:    NSR, Rate approx 95 bpm, normal axis, normal intervals. Poor R wave progression, ?pathologic Q waves in III and AVF.    Echo 6/7/18  Moderate concentric wall thickening consistent with left ventricular hypertrophy is present.  Biventricular size and systolic function is normal.The LVEF is 55-60%.  No significant valve disease.  Inferior vena cava appers dilated measuring 2.4 cm    MPI stress test 5/22/2017  1.  Probably normal myocardial SPECT study with a summed stress score  of  4 . A summed stress score of 4 is associated with an annual event  rate of 0.8% and 0.9% for myocardial infarction and cardiac death,  respectively (Jaja. Circulation 1998;98:535-43).  2.  Small area of apical photopenia without reversibility which most  likely represents attenuation artifact.  3.   Normal left ventricular systolic function with a left ventricular  ejection fraction of  58%.   4.   No prior study available for comparison    ECHO (12/30/2016)  Global and regional left ventricular function is normal with an EF of 55-60%.  Moderate concentric left ventricular hypertrophy.  Right ventricular function, chamber size, wall motion, and thickness are normal.  The IVC is normal. The estimated right atrial pressure is < 5 mmHg.  No pericardial effusion is present.  Previous study not available for comparison.      Assessment and Plan:   In summary, this is a 59-year-old female with a past medical history significant for hypertension, hyperlipidemia, and diastolic dysfunction who presents in follow-up.    1. Hypertension, uncontrolled: Patient has not been taking her Aldactone over the past 4 days.  We will plan to reinitiate Aldactone and continue her lisinopril 10 mg daily.    2. Diastolic dysfunction / HFpEF, ACC B / NYHA II: Never hospitalized. Presumptive diagnosis has been made on the presence of SANTOS.  While the patient continues to be significantly functionally limited, it is  difficult to attribute the symptoms primarily to diastolic dysfunction.  Overall, she does not appear volume overloaded, however volume assessment is difficult.  Continue Aldactone.    3. Hyperlipidemia / Elevated ASCVD: Discussed the initiation of atorvastatin today given her elevated risk.    4. Nocturnal bradycardia: Saw EP. No further intervention recommended as was vagally mediated event.     This patient was seen and discussed with Dr. Valadez.    Misael Kaiser MD  Cardiology Fellow      ATTENDING ATTESTATION:  This patient has been seen and examined by me May 20, 2019 with Misael Kaiser MD, cardiology fellow. I have reviewed the vitals, laboratory and imaging data relevant to this patient's care. I have edited this note to reflect our joint assessment and plan, and discussed the plan with the patient.    Mrs. Shira Rodriguez is a 59-year old lady with    - Prolonged sinus pause during recent sleep study  - Hypertension, poorly controlled  - Diastolic heart failure/HFpEF; NYHA III, AHA/ACC stage C  - Dyslipidemia  - estimated 10-yr ASCVD risk of 9%    - Smoking  - Sleep apnea  - Morbid obesity BMI 70  - No indication for PPM per EP consultation in 4/2019    Plan    Continue lisinopril 10 mg q24h.   Add aldactone 25 mg/d  ASA 81 mg q24h for primary prevention  Advised regarding limiting salt intake, weight management, daily walking, and smoking cessation   Weight reduction    F/u as needed  Brigette Valadez MD, MS  Staff Cardiologist  Pager: [phone]

## 2019-05-20 ENCOUNTER — OFFICE VISIT (OUTPATIENT)
Dept: CARDIOLOGY | Facility: CLINIC | Age: 59
End: 2019-05-20
Attending: INTERNAL MEDICINE
Payer: COMMERCIAL

## 2019-05-20 VITALS
HEART RATE: 103 BPM | DIASTOLIC BLOOD PRESSURE: 83 MMHG | HEIGHT: 66 IN | SYSTOLIC BLOOD PRESSURE: 162 MMHG | BODY MASS INDEX: 47.09 KG/M2 | WEIGHT: 293 LBS | OXYGEN SATURATION: 93 %

## 2019-05-20 DIAGNOSIS — R00.1 VAGAL BRADYCARDIA: ICD-10-CM

## 2019-05-20 DIAGNOSIS — I10 ESSENTIAL HYPERTENSION: Primary | ICD-10-CM

## 2019-05-20 DIAGNOSIS — E66.01 MORBID OBESITY (H): ICD-10-CM

## 2019-05-20 DIAGNOSIS — G47.33 OSA (OBSTRUCTIVE SLEEP APNEA): ICD-10-CM

## 2019-05-20 PROCEDURE — G0463 HOSPITAL OUTPT CLINIC VISIT: HCPCS | Mod: 25,ZF

## 2019-05-20 PROCEDURE — 93005 ELECTROCARDIOGRAM TRACING: CPT | Mod: ZF

## 2019-05-20 PROCEDURE — 99214 OFFICE O/P EST MOD 30 MIN: CPT | Mod: GC | Performed by: INTERNAL MEDICINE

## 2019-05-20 PROCEDURE — 93010 ELECTROCARDIOGRAM REPORT: CPT | Mod: ZP | Performed by: INTERNAL MEDICINE

## 2019-05-20 ASSESSMENT — MIFFLIN-ST. JEOR: SCORE: 2576.7

## 2019-05-20 ASSESSMENT — PAIN SCALES - GENERAL: PAINLEVEL: NO PAIN (0)

## 2019-05-20 NOTE — NURSING NOTE
Chief Complaint   Patient presents with     Follow Up     reason for visit: Bradycardia arrhythmias and sinus pauses      Vitals were taken and medications were reconciled. EKG was performed.    MELISA Jaramillo  7:41 AM

## 2019-05-20 NOTE — LETTER
5/20/2019      RE: Shira Rodriguez  [address] [address] [address]       Dear Colleague,    Thank you for the opportunity to participate in the care of your patient, Shira Rodriguez, at the [facility]. Please see a copy of my visit note below.    HPI:   Ms. Kristel Rodriguez is a 56-year old lady with hypertension, morbid obesity, tobacco abuse, diastolic dysfunction, and nocturnal bradycardia who presents for follow up. At the patient's last visit, aldactone was added to her medical regimen. In addition, the patient saw EP to evaluate sinus pauses, which were found to be consistent with vagally mediated bradycardia during sleep.    Since the patient's last visit, she has had multiple noncardiac issues.  She reports significant back pain which is led to her need to reduce hours at work.  In addition, she was also falling asleep at work.  She eventually quit her job on Thursday.  Since quitting her job, she had not left her house until to come to this appointment.  She reports multiple different joint pains.  In addition, on Friday, she had substernal chest pain which was nonradiating.  It was intermittent throughout the day Friday.  It had some pleuritic symptoms.  After waking up on Saturday she had no pain.    She reports intermittent orthopnea, though it is unclear if it is related to her CPAP use.  She has not taken her Aldactone in the past 4 days and does not take her blood pressure at home.  She continues to smoke 1 pack/day and has plans to quit when her mother returns from vacation.    Cardiac meds:  - lisinopril 10 mg daily  - aldactone 25 mg daily      PAST MEDICAL HISTORY:  Past Medical History:   Diagnosis Date     Chronic bronchitis (H) 07/30/2015     Contact dermatitis      Depressive disorder 1985     Eczema     HANDS     Elevated liver enzymes      H/O total knee replacement, left      History of total hip replacement  "10/27/2011     Hyperlipidemia      Hypertension      Morbid obesity (H)      Osteoarthrosis     right knee     Sleep apnea      Tobacco use disorder        CURRENT MEDICATIONS:  Current Outpatient Medications   Medication Sig Dispense Refill     DULoxetine (CYMBALTA) 60 MG EC capsule TK ONE C PO  QAM  0     folic acid (FOLVITE) 1 MG tablet Take 4 tablets (4 mg) by mouth daily Take 3-4 tablets daily 360 tablet 3     insulin syringe-needle U-100 (BD INSULIN SYRINGE ULTRAFINE) 30G X 1/2\" 1 ML For methotrexate use weekly 8 each prn     leucovorin (WELLCOVORIN) 5 MG tablet Take 1 tablet (5 mg) by mouth every 7 days *take 24h after taking weekly methotrexate dose 4 tablet 3     lisinopril (PRINIVIL/ZESTRIL) 10 MG tablet Take 1 tablet (10 mg) by mouth daily 90 tablet 3     methotrexate 50 MG/2ML injection CHEMO Inject 1 mL (25 mg) Subcutaneous every 7 days Inject 0.8 mL (20 mg) weekly 4 mL 4     methylPREDNISolone (MEDROL DOSEPAK) 4 MG tablet Follow package instructions 21 tablet 0     spironolactone (ALDACTONE) 25 MG tablet Take 1 tablet (25 mg) by mouth daily 90 tablet 3     varenicline (CHANTIX STARTING MONTH ) 0.5 MG X 11 & 1 MG X 42 tablet Take 0.5 mg by mouth daily AND 0.5 mg 2 times daily AND 1 mg 2 times daily. 53 tablet 0       PAST SURGICAL HISTORY:  Past Surgical History:   Procedure Laterality Date     ARTHROPLASTY KNEE  2012    Procedure: ARTHROPLASTY KNEE;  Left Total Knee Arthroplasty;  Surgeon: Annette Quesada MD;  Location: [facility]     ARTHROSCOPY HIP Right      C TOTAL HIP ARTHROPLASTY  04    RT       ALLERGIES     Allergies   Allergen Reactions     Adhesive Tape Blisters     Erythromycin Rash       FAMILY HISTORY:  Father  when she was age 12. Potentially had MI   PGF with MI though ?age   Mother with HTN, TIA and stroke  Three paternal uncles with CABG, at least one in early 50s     SOCIAL HISTORY:  - Current smoker, smoked 30 years x 1 PPD  - Typically has less than one " "drink/month  - No illicit drugs      ROS:   A comprehensive 10 point ROS was completed and relevant findings are noted in the HPI.    EXAM:  /83 (BP Location: Right arm, Patient Position: Chair, Cuff Size: Adult Large)   Pulse 103   Ht 1.676 m (5' 6\")   Wt (!) 198 kg (436 lb 8 oz)   SpO2 93%   BMI 70.45 kg/m        My recheck: 140/88 from forearm.     In general, the patient is a pleasant female in no apparent distress. Morbidly obese    HEENT: NC/AT.  PERRLA.  EOMI.  Sclerae white, not injected.    Neck: Carotids 2+ bilaterally without bruits.  No jugular venous distension.   Lymph: No cervical adenopathy. No thyromegaly.   Heart: tachycardic rate, regular rhythm. Normal S1, S2. No murmur, rub, click, or gallop. There is no heave.    Lungs: Clear bilaterally.  No rhonchi, wheezes, rales.   GI: Soft, nontender, nondistended.   Extremities: Trace edema.  The pulses are 2+at the radial and DP bilaterally.  Neuro: grossly non focal.   Skin: no rashes. Dusky discoloration b/l shins R>L  Endocrine: no thyromegaly  Musculoskeletal: no joint swelling  Psych: pleasant and conversant      Labs:  LIPID RESULTS:  Lab Results   Component Value Date    CHOL 174 04/23/2019    HDL 51 04/23/2019    LDL 97 04/23/2019    TRIG 128 04/23/2019    CHOLHDLRATIO 4.5 06/01/2012    NHDL 123 04/23/2019       LIVER ENZYME RESULTS:  Lab Results   Component Value Date    AST 17 04/23/2019    ALT 31 04/23/2019       CBC RESULTS:  Lab Results   Component Value Date    WBC 9.5 04/23/2019    RBC 5.12 04/23/2019    HGB 16.1 (H) 04/23/2019    HCT 49.7 (H) 04/23/2019    MCV 97 04/23/2019    MCH 31.4 04/23/2019    MCHC 32.4 04/23/2019    RDW 14.4 04/23/2019     04/23/2019       BMP RESULTS:  Lab Results   Component Value Date     04/23/2019    POTASSIUM 4.7 04/23/2019    CHLORIDE 105 04/23/2019    CO2 30 04/23/2019    ANIONGAP 6 04/23/2019     (H) 04/23/2019    BUN 13 04/23/2019    CR 0.67 04/23/2019    GFRESTIMATED >90 " 04/23/2019    GFRESTBLACK >90 04/23/2019    MARIA INES 10.0 04/23/2019        A1C RESULTS:  Lab Results   Component Value Date    A1C 5.7 (H) 04/23/2019       INR RESULTS:  Lab Results   Component Value Date    INR 1.01 04/20/2017    INR 1.31 (H) 06/18/2012       Procedures:    ECG today - NSR, inferior infarct which has been present on multiple past EKGs        EKG 1/16/17:    NSR, Rate approx 95 bpm, normal axis, normal intervals. Poor R wave progression, ?pathologic Q waves in III and AVF.    Echo 6/7/18  Moderate concentric wall thickening consistent with left ventricular hypertrophy is present.  Biventricular size and systolic function is normal.The LVEF is 55-60%.  No significant valve disease.  Inferior vena cava appers dilated measuring 2.4 cm    MPI stress test 5/22/2017  1.  Probably normal myocardial SPECT study with a summed stress score  of  4 . A summed stress score of 4 is associated with an annual event  rate of 0.8% and 0.9% for myocardial infarction and cardiac death,  respectively (Jaja. Circulation 1998;98:535-43).  2.  Small area of apical photopenia without reversibility which most  likely represents attenuation artifact.  3.   Normal left ventricular systolic function with a left ventricular  ejection fraction of  58%.   4.   No prior study available for comparison    ECHO (12/30/2016)  Global and regional left ventricular function is normal with an EF of 55-60%.  Moderate concentric left ventricular hypertrophy.  Right ventricular function, chamber size, wall motion, and thickness are normal.  The IVC is normal. The estimated right atrial pressure is < 5 mmHg.  No pericardial effusion is present.  Previous study not available for comparison.      Assessment and Plan:   In summary, this is a 59-year-old female with a past medical history significant for hypertension, hyperlipidemia, and diastolic dysfunction who presents in follow-up.    1. Hypertension, uncontrolled: Patient has not been  taking her Aldactone over the past 4 days.  We will plan to reinitiate Aldactone and continue her lisinopril 10 mg daily.    2. Diastolic dysfunction / HFpEF, ACC B / NYHA II: Never hospitalized. Presumptive diagnosis has been made on the presence of SANTOS.  While the patient continues to be significantly functionally limited, it is difficult to attribute the symptoms primarily to diastolic dysfunction.  Overall, she does not appear volume overloaded, however volume assessment is difficult.  Continue Aldactone.    3. Hyperlipidemia / Elevated ASCVD: Discussed the initiation of atorvastatin today given her elevated risk.    4. Nocturnal bradycardia: Saw EP. No further intervention recommended as was vagally mediated event.     This patient was seen and discussed with Dr. Valadez.    Misael Kaiser MD  Cardiology Fellow      ATTENDING ATTESTATION:  This patient has been seen and examined by me May 20, 2019 with Misael Kaiser MD, cardiology fellow. I have reviewed the vitals, laboratory and imaging data relevant to this patient's care. I have edited this note to reflect our joint assessment and plan, and discussed the plan with the patient.    Mrs. Shira Rodriguez is a 59-year old lady with    - Prolonged sinus pause during recent sleep study  - Hypertension, poorly controlled  - Diastolic heart failure/HFpEF; NYHA III, AHA/ACC stage C  - Dyslipidemia  - estimated 10-yr ASCVD risk of 9%    - Smoking  - Sleep apnea  - Morbid obesity BMI 70  - No indication for PPM per EP consultation in 4/2019    Plan    Continue lisinopril 10 mg q24h.   Add aldactone 25 mg/d  ASA 81 mg q24h for primary prevention  Advised regarding limiting salt intake, weight management, daily walking, and smoking cessation   Weight reduction    F/u as needed  Brigette Valadez MD, MS  Staff Cardiologist  Pager: [phone]

## 2019-05-20 NOTE — PATIENT INSTRUCTIONS
Patient Instructions:  It was a pleasure to see you in the cardiology clinic today.      If you have any questions, call  Janki Flores RN, at [phone].  Press Option #1 for the [facility], and then press Option #3 for nursing.  We are encouraging the use of [employer] to communicate with your HealthCare Provider    Note the new medications: none  Stop the following medications: none    The results from today include: none  Please follow up with Dr. Brigette Valadez as needed  Continue sleep apnea treatments  Monitor BP's at home, goal < 140/90      If you have an urgent need after hours (8:00 am to 4:30 pm) please call [phone] and ask for the cardiology fellow on call.

## 2019-05-20 NOTE — NURSING NOTE
Med Reconcile: Reviewed and verified all current medications with the patient. The updated medication list was printed and given to the patient.  Smoking Cessation: Patient instructed regarding options for smoking cessation and given written information regarding smoking cessation assistance programs. Patient demonstrated understanding of this information and agreed to call with further questions or concerns.  Patient stated she understood all health information given and agreed to call with further questions or concerns.

## 2019-05-21 DIAGNOSIS — M54.16 LUMBAR RADICULAR PAIN: ICD-10-CM

## 2019-05-21 DIAGNOSIS — M54.41 CHRONIC BILATERAL LOW BACK PAIN WITH BILATERAL SCIATICA: Primary | ICD-10-CM

## 2019-05-21 DIAGNOSIS — G89.29 CHRONIC BILATERAL LOW BACK PAIN WITH BILATERAL SCIATICA: Primary | ICD-10-CM

## 2019-05-21 DIAGNOSIS — M54.42 CHRONIC BILATERAL LOW BACK PAIN WITH BILATERAL SCIATICA: Primary | ICD-10-CM

## 2019-05-21 LAB — INTERPRETATION ECG - MUSE: NORMAL

## 2019-05-22 ASSESSMENT — ENCOUNTER SYMPTOMS
STIFFNESS: 1
WEAKNESS: 1
TASTE DISTURBANCE: 0
JOINT SWELLING: 0
MUSCLE WEAKNESS: 0
NUMBNESS: 1
TINGLING: 1
TROUBLE SWALLOWING: 0
DISTURBANCES IN COORDINATION: 0
SHORTNESS OF BREATH: 1
DIZZINESS: 0
DEPRESSION: 1
MEMORY LOSS: 0
SEIZURES: 0
DYSPNEA ON EXERTION: 1
SORE THROAT: 0
BACK PAIN: 1
NERVOUS/ANXIOUS: 1
WHEEZING: 1
SNORES LOUDLY: 1
NECK MASS: 0
INSOMNIA: 1
COUGH: 1
SPUTUM PRODUCTION: 1
DECREASED CONCENTRATION: 0
SPEECH CHANGE: 0
HEMOPTYSIS: 0
POSTURAL DYSPNEA: 1
MYALGIAS: 0
HOARSE VOICE: 0
PANIC: 0
COUGH DISTURBING SLEEP: 1
MUSCLE CRAMPS: 0
PARALYSIS: 0
HEADACHES: 0
SINUS CONGESTION: 1
ARTHRALGIAS: 1
SMELL DISTURBANCE: 0
NECK PAIN: 0
SINUS PAIN: 0
TREMORS: 0
LOSS OF CONSCIOUSNESS: 0

## 2019-05-23 ENCOUNTER — OFFICE VISIT (OUTPATIENT)
Dept: FAMILY MEDICINE | Facility: CLINIC | Age: 59
End: 2019-05-23
Payer: COMMERCIAL

## 2019-05-23 VITALS
WEIGHT: 293 LBS | BODY MASS INDEX: 47.09 KG/M2 | TEMPERATURE: 97.9 F | HEART RATE: 101 BPM | SYSTOLIC BLOOD PRESSURE: 135 MMHG | DIASTOLIC BLOOD PRESSURE: 83 MMHG | OXYGEN SATURATION: 95 % | HEIGHT: 66 IN

## 2019-05-23 DIAGNOSIS — I10 BENIGN ESSENTIAL HYPERTENSION: Primary | ICD-10-CM

## 2019-05-23 DIAGNOSIS — R21 RASH: ICD-10-CM

## 2019-05-23 DIAGNOSIS — R79.89 ELEVATED PTHRP LEVEL: ICD-10-CM

## 2019-05-23 DIAGNOSIS — I10 BENIGN ESSENTIAL HYPERTENSION: ICD-10-CM

## 2019-05-23 LAB
ANION GAP SERPL CALCULATED.3IONS-SCNC: 7 MMOL/L (ref 3–14)
BUN SERPL-MCNC: 15 MG/DL (ref 7–30)
CALCIUM SERPL-MCNC: 10 MG/DL (ref 8.5–10.1)
CHLORIDE SERPL-SCNC: 104 MMOL/L (ref 94–109)
CO2 SERPL-SCNC: 26 MMOL/L (ref 20–32)
CREAT SERPL-MCNC: 0.59 MG/DL (ref 0.52–1.04)
GFR SERPL CREATININE-BSD FRML MDRD: >90 ML/MIN/{1.73_M2}
GLUCOSE SERPL-MCNC: 117 MG/DL (ref 70–99)
POTASSIUM SERPL-SCNC: 4.3 MMOL/L (ref 3.4–5.3)
PTH-INTACT SERPL-MCNC: 58 PG/ML (ref 18–80)
SODIUM SERPL-SCNC: 137 MMOL/L (ref 133–144)

## 2019-05-23 RX ORDER — NYSTATIN 100000 [USP'U]/G
POWDER TOPICAL
Qty: 60 G | Refills: 3 | Status: ON HOLD | OUTPATIENT
Start: 2019-05-23 | End: 2019-10-07

## 2019-05-23 ASSESSMENT — ANXIETY QUESTIONNAIRES
GAD7 TOTAL SCORE: 11
1. FEELING NERVOUS, ANXIOUS, OR ON EDGE: MORE THAN HALF THE DAYS
6. BECOMING EASILY ANNOYED OR IRRITABLE: NOT AT ALL
3. WORRYING TOO MUCH ABOUT DIFFERENT THINGS: NEARLY EVERY DAY
IF YOU CHECKED OFF ANY PROBLEMS ON THIS QUESTIONNAIRE, HOW DIFFICULT HAVE THESE PROBLEMS MADE IT FOR YOU TO DO YOUR WORK, TAKE CARE OF THINGS AT HOME, OR GET ALONG WITH OTHER PEOPLE: VERY DIFFICULT
2. NOT BEING ABLE TO STOP OR CONTROL WORRYING: NEARLY EVERY DAY
7. FEELING AFRAID AS IF SOMETHING AWFUL MIGHT HAPPEN: SEVERAL DAYS
5. BEING SO RESTLESS THAT IT IS HARD TO SIT STILL: NOT AT ALL

## 2019-05-23 ASSESSMENT — MIFFLIN-ST. JEOR: SCORE: 2555.84

## 2019-05-23 ASSESSMENT — PAIN SCALES - GENERAL: PAINLEVEL: EXTREME PAIN (8)

## 2019-05-23 ASSESSMENT — PATIENT HEALTH QUESTIONNAIRE - PHQ9
SUM OF ALL RESPONSES TO PHQ QUESTIONS 1-9: 14
5. POOR APPETITE OR OVEREATING: MORE THAN HALF THE DAYS

## 2019-05-23 NOTE — PROGRESS NOTES
[facility]  Primary Care Center   Anjel Busby MD  05/23/2019      Chief Complaint:   Physical       History of Present Illness:   Shira Rodriguez is a 59 year old female with a history of hypertension, morbid obesity, obstructive sleep apnea, and hyperlipidemia who presents alone for a physical.    Back pain: She had a lumbar injection on 1/31/19. She has had 3 epidurals total. The past couple of weeks her back pain has been very bad. She had to quit her job as she was not able to sit for extended periods of time due to the pain. She started using her walker to take some weight off of her back, however when her RA acts up, she is unable to use her walker because her hands and elbows cause her pain. She has not been to physical therapy as this was difficult to do after working a full day. She does have water therapy scheduled for tomorrow at [facility]. She notes that her back pain is partially alleviated when using a walker or shopping cart. After an injection, she is able to walk around without the support of a cart or walker. She is interested in applying for disability while she tries to improve her health for the next year or so.      Sleep apnea: She completed a sleep study since her last visit. During the study, she woke up at 3 am in a panic without chest pain and was then woken up an hour later and told she had 8 seconds of asystole. Per patient report, Dr. Valadez believes the period of asystole was shorter than 8 seconds. She was given a 24 hour monitor and was switched to a different monitor after a week due to tape allergies. The specialists were seeing some abnormal activity between 4 - 6 am after 2 weeks of wearing the monitor. She then met with an electrophysiologist for further evaluation and was told that once her obstructive sleep apnea was under control, this would likely be resolved. She reports that her sleep apnea is not under control yet as she typically wakes up coughing after 4 hours and  "she has to take the mask off.    Rheumatoid Arthritis: She follows with Dr. Austin for this. They recently increased Methotrexate. Of note, this morning she woke up with some pain in her elbows but this is the first flare that has occurred for some time. Typically, her elbows do not hurt as often as the wrists. She reports that her right hand gets numb sometimes during flares and she thinks this is because the nerves get compressed. She notes that that the injections she has received in the past have alleviated her pain for months. She is not currently taking Prednisone.    Pulmonology: She has a chronic cough for which she is being followed by pulmonology. She plans to quit smoking and has been given Chantix to help with this. She has not started with Chantix yet. She is scheduled for a lung function test in June. In general, she feels like she is not able to get enough air. Per patient report, Dr. Valadez does not think this is related to heart concerns.     Hypertension: She reports that her blood pressure is doing well. Of note, Spironolactone was added to her medication regimen which seems to be helping with her blood pressure.     Face: She notes that a few times in the past 2-3 weeks, within a matter of seconds she develops a \"parotid stone\" and tension on the side of her face. This is alleviated after a few minutes of sucking on a lemon drop.    Healthcare maintenance: She has not had mammogram this year yet. She has not done a colonoscopy yet. She does have a Cologaurd kit at home that she has not yet completed but plans to in the future. Her pap smear is due in 2021. She is due for a pneumonia booster. She received the first round of Shingrix and is due for the booster.     Of note, she has met with our weight management program. She and her mother have joined the community center in [place] where she hopes to walk on days when she does not have physical therapy.     Other concerns discussed:  1. " Recent breast and neck rash     Review of Systems:   Pertinent items are noted in HPI or as in patient entered ROS below, remainder of complete ROS is negative.  Answers for HPI/ROS submitted by the patient on 5/22/2019   General Symptoms: No  Skin Symptoms: No  HENT Symptoms: Yes  EYE SYMPTOMS: No  HEART SYMPTOMS: No  LUNG SYMPTOMS: Yes  INTESTINAL SYMPTOMS: No  URINARY SYMPTOMS: No  GYNECOLOGIC SYMPTOMS: No  BREAST SYMPTOMS: No  SKELETAL SYMPTOMS: Yes  BLOOD SYMPTOMS: No  NERVOUS SYSTEM SYMPTOMS: Yes  MENTAL HEALTH SYMPTOMS: Yes  Ear pain: No  Ear discharge: No  Hearing loss: No  Tinnitus: No  Nosebleeds: No  Congestion: Yes  Sinus pain: No  Trouble swallowing: No   Voice hoarseness: No  Mouth sores: No  Sore throat: No  Tooth pain: No  Gum tenderness: No  Bleeding gums: No  Change in taste: No  Change in sense of smell: No  Dry mouth: No  Hearing aid used: No  Neck lump: No  Cough: Yes  Sputum or phlegm: Yes  Coughing up blood: No  Difficulty breating or shortness of breath: Yes  Snoring: Yes  Wheezing: Yes  Difficulty breathing on exertion: Yes  Nighttime Cough: Yes  Difficulty breathing when lying flat: Yes  Back pain: Yes  Muscle aches: No  Neck pain: No  Swollen joints: No  Joint pain: Yes  Bone pain: Yes  Muscle cramps: No  Muscle weakness: No  Joint stiffness: Yes  Bone fracture: No  Trouble with coordination: No  Dizziness or trouble with balance: No  Fainting or black-out spells: No  Memory loss: No  Headache: No  Seizures: No  Speech problems: No  Tingling: Yes  Tremor: No  Weakness: Yes  Difficulty walking: Yes  Paralysis: No  Numbness: Yes  Nervous or Anxious: Yes  Depression: Yes  Trouble sleeping: Yes  Trouble thinking or concentrating: No  Mood changes: No  Panic attacks: No      Active Medications:      DULoxetine (CYMBALTA) 60 MG EC capsule, TK ONE C PO  QAM, Disp: , Rfl: 0     folic acid (FOLVITE) 1 MG tablet, Take 4 tablets (4 mg) by mouth daily Take 3-4 tablets daily, Disp: 360 tablet, Rfl:  "3     insulin syringe-needle U-100 (BD INSULIN SYRINGE ULTRAFINE) 30G X 1/2\" 1 ML, For methotrexate use weekly, Disp: 8 each, Rfl: prn     leucovorin (WELLCOVORIN) 5 MG tablet, Take 1 tablet (5 mg) by mouth every 7 days *take 24h after taking weekly methotrexate dose, Disp: 4 tablet, Rfl: 3     lisinopril (PRINIVIL/ZESTRIL) 10 MG tablet, Take 1 tablet (10 mg) by mouth daily, Disp: 90 tablet, Rfl: 3     methotrexate 50 MG/2ML injection CHEMO, Inject 1 mL (25 mg) Subcutaneous every 7 days Inject 0.8 mL (20 mg) weekly, Disp: 4 mL, Rfl: 4     methylPREDNISolone (MEDROL DOSEPAK) 4 MG tablet, Follow package instructions, Disp: 21 tablet, Rfl: 0     spironolactone (ALDACTONE) 25 MG tablet, Take 1 tablet (25 mg) by mouth daily, Disp: 90 tablet, Rfl: 3     varenicline (CHANTIX STARTING MONTH PAK) 0.5 MG X 11 & 1 MG X 42 tablet, Take 0.5 mg by mouth daily AND 0.5 mg 2 times daily AND 1 mg 2 times daily., Disp: 53 tablet, Rfl: 0     Allergies:   Adhesive tape   Erythromycin      Past Medical History:  Chronic bronchitis   Contact dermatitis  Depressive disorder  Eczema  Elevated liver enzymes  Hyperlipidemia  Hypertension   Morbid obesity  Osteoarthrosis   Obstructive sleep apnea   Adjustment disorder with depressed mood  Degeneration of cervical intervertebral disc     Past Surgical History:  Arthroplasty knee - 6/14/2012  Arthroscopy hip, right - 7/9/2004    Family History:   Thyroid disease - mother, sister  Hypertension - mother, maternal grandmother   Skin cancer - mother  Cerebrovascular disease - mother  Diabetes - mother  Breast cancer - paternal grandmother   Pancreatic cancer - paternal grandmother   Cardiovascular - paternal aunt, paternal uncle  Depression - other  Alcoholism - father, sister  Heart disease - sister (multiple ablations)  Prostate cancer - paternal grandfather       Social History:   The patient is single, a current every day smoker (1 ppd for 33 years), and does occasionally consume alcohol.    " "  Physical Exam:   /83 (BP Location: Right arm, Patient Position: Sitting, Cuff Size: Adult Large)   Pulse 101   Temp 97.9  F (36.6  C) (Oral)   Ht 1.676 m (5' 6\")   Wt (!) 195.9 kg (431 lb 14.4 oz)   SpO2 95%   BMI 69.71 kg/m     Constitutional: Alert. In no distress. Obese  Head: Normocephalic. No masses, lesions, tenderness or abnormalities.  ENT: No neck nodes or sinus tenderness.  Cardiovascular: RRR. No murmurs, clicks, gallops, or rub.  Respiratory: Clear to auscultation bilaterally, no wheezes or crackles.  Gastrointestinal: Abdomen soft. Non-tender. BS normal. No masses or organomegaly.  Musculoskeletal: Extremities normal. No gross deformities noted. Normal muscle tone.  Skin: No suspicious lesions. No rashes. Nape of neck skin fold there is erythema.   Neurologic: Gait normal. Reflexes normal and symmetric. Sensation grossly WNL.  Psychiatric: Mentation appears normal. Normal affect.   Hematologic/Lymphatic/Immunologic: Normal cervical lymph nodes.       Assessment and Plan:  Benign essential hypertension  - Basic metabolic panel    Elevated PTHrP level  - Parathyroid Hormone Intact    Rash  - nystatin (MYCOSTATIN) 687157 UNIT/GM external powder  Dispense: 60 g; Refill: 3     Obesity   Discussed if she should follow-up with [facility] management. She will think about this for now.     I will ask nurses to offer ENT referral for likely stone in her salivary duct.     Currently not working. She might pursue disabiliy. If so, this would be based on shortness of breath which is likely combination of lung disease, diastolic dysfunction, and obesity. Also based on osteoarthritis of knees and back and RA and possibly depression as well.        Follow-up: PRN         Scribe Disclosure:  I, Gladis Lovell, am serving as a scribe to document services personally performed by Anjel Busby MD at this visit, based upon the provider's statements to me. All documentation has been reviewed by the " aforementioned provider prior to being entered into the official medical record.     Portions of this medical record were completed by a scribe. UPON MY REVIEW AND AUTHENTICATION BY ELECTRONIC SIGNATURE, this confirms (a) I performed the applicable clinical services, and (b) the record is accurate.   Anjel Busby MD

## 2019-05-23 NOTE — PATIENT INSTRUCTIONS
[facility] Medication Refill Request Information:  * Please contact your pharmacy regarding ANY request for medication refills.  ** [facility] Prescription Fax = [phone]  * Please allow 3 business days for routine medication refills.  * Please allow 5 business days for controlled substance medication refills.     [facility] Test Result notification information:  *You will be notified with in 7-10 days of your appointment day regarding the results of your test.  If you are on MyChart you will be notified as soon as the provider has reviewed the results and signed off on them.    [facility]: [phone]

## 2019-05-23 NOTE — NURSING NOTE
Chief Complaint   Patient presents with     Physical     pt here for physical       Oralia Washburn CMA at 11:23 AM on 5/23/2019.

## 2019-05-24 ENCOUNTER — TELEPHONE (OUTPATIENT)
Dept: FAMILY MEDICINE | Facility: CLINIC | Age: 59
End: 2019-05-24

## 2019-05-24 ASSESSMENT — ANXIETY QUESTIONNAIRES: GAD7 TOTAL SCORE: 11

## 2019-06-11 ENCOUNTER — HOSPITAL ENCOUNTER (OUTPATIENT)
Dept: CARDIAC REHAB | Facility: CLINIC | Age: 59
End: 2019-06-11
Attending: INTERNAL MEDICINE
Payer: COMMERCIAL

## 2019-06-11 DIAGNOSIS — Z79.631 METHOTREXATE, LONG TERM, CURRENT USE: ICD-10-CM

## 2019-06-11 DIAGNOSIS — R06.02 SHORTNESS OF BREATH: ICD-10-CM

## 2019-06-11 PROCEDURE — 40001038 ZZH STATISTIC RESPIRATORY TESTING VISIT

## 2019-06-11 PROCEDURE — 94729 DIFFUSING CAPACITY: CPT

## 2019-06-11 PROCEDURE — 94375 RESPIRATORY FLOW VOLUME LOOP: CPT

## 2019-06-17 LAB
DLCOUNC-%PRED-PRE: 102 %
DLCOUNC-PRE: 22.4 ML/MIN/MMHG
DLCOUNC-PRED: 21.79 ML/MIN/MMHG
ERV-%PRED-PRE: -43 %
ERV-PRE: 0.07 L
ERV-PRED: -0.17 L
EXPTIME-PRE: 6.18 SEC
FEF2575-%PRED-PRE: 62 %
FEF2575-PRE: 1.54 L/SEC
FEF2575-PRED: 2.47 L/SEC
FEFMAX-%PRED-PRE: 62 %
FEFMAX-PRE: 4.22 L/SEC
FEFMAX-PRED: 6.71 L/SEC
FEV1-%PRED-PRE: 65 %
FEV1-PRE: 1.79 L
FEV1FEV6-PRE: 78 %
FEV1FEV6-PRED: 81 %
FEV1FVC-PRE: 78 %
FEV1FVC-PRED: 79 %
FEV1SVC-PRE: 71 %
FEV1SVC-PRED: 76 %
FIFMAX-PRE: 1.81 L/SEC
FVC-%PRED-PRE: 66 %
FVC-PRE: 2.29 L
FVC-PRED: 3.46 L
IC-%PRED-PRE: 65 %
IC-PRE: 2.44 L
IC-PRED: 3.74 L
VA-%PRED-PRE: 72 %
VA-PRE: 3.78 L
VC-%PRED-PRE: 70 %
VC-PRE: 2.51 L
VC-PRED: 3.57 L

## 2019-06-25 ENCOUNTER — PRE VISIT (OUTPATIENT)
Dept: RHEUMATOLOGY | Facility: CLINIC | Age: 59
End: 2019-06-25

## 2019-06-25 NOTE — TELEPHONE ENCOUNTER
Spoke to patient , called with a reminder about there upcoming appointment , also instructed to bring medications or medication list.    Sarah Warren CMA

## 2019-06-26 ENCOUNTER — OFFICE VISIT (OUTPATIENT)
Dept: ORTHOPEDICS | Facility: CLINIC | Age: 59
End: 2019-06-26
Payer: COMMERCIAL

## 2019-06-26 ENCOUNTER — OFFICE VISIT (OUTPATIENT)
Dept: RHEUMATOLOGY | Facility: CLINIC | Age: 59
End: 2019-06-26
Attending: INTERNAL MEDICINE
Payer: COMMERCIAL

## 2019-06-26 VITALS
BODY MASS INDEX: 47.09 KG/M2 | DIASTOLIC BLOOD PRESSURE: 75 MMHG | SYSTOLIC BLOOD PRESSURE: 132 MMHG | HEIGHT: 66 IN | OXYGEN SATURATION: 92 % | HEART RATE: 88 BPM | TEMPERATURE: 98 F | WEIGHT: 293 LBS

## 2019-06-26 VITALS — HEART RATE: 106 BPM | SYSTOLIC BLOOD PRESSURE: 134 MMHG | DIASTOLIC BLOOD PRESSURE: 80 MMHG | OXYGEN SATURATION: 93 %

## 2019-06-26 DIAGNOSIS — M54.16 LUMBAR RADICULAR PAIN: Primary | ICD-10-CM

## 2019-06-26 DIAGNOSIS — Z79.899 HIGH RISK MEDICATION USE: ICD-10-CM

## 2019-06-26 DIAGNOSIS — M05.742 RHEUMATOID ARTHRITIS INVOLVING BOTH HANDS WITH POSITIVE RHEUMATOID FACTOR (H): ICD-10-CM

## 2019-06-26 DIAGNOSIS — M05.741 RHEUMATOID ARTHRITIS INVOLVING BOTH HANDS WITH POSITIVE RHEUMATOID FACTOR (H): ICD-10-CM

## 2019-06-26 DIAGNOSIS — M51.369 LUMBAR DEGENERATIVE DISC DISEASE: ICD-10-CM

## 2019-06-26 DIAGNOSIS — M05.79 RHEUMATOID ARTHRITIS INVOLVING MULTIPLE SITES WITH POSITIVE RHEUMATOID FACTOR (H): ICD-10-CM

## 2019-06-26 DIAGNOSIS — T45.1X5A ADVERSE EFFECT OF METHOTREXATE, INITIAL ENCOUNTER: ICD-10-CM

## 2019-06-26 LAB
ALT SERPL W P-5'-P-CCNC: 26 U/L (ref 0–50)
AST SERPL W P-5'-P-CCNC: 13 U/L (ref 0–45)
CREAT SERPL-MCNC: 0.64 MG/DL (ref 0.52–1.04)
ERYTHROCYTE [DISTWIDTH] IN BLOOD BY AUTOMATED COUNT: 14.5 % (ref 10–15)
GFR SERPL CREATININE-BSD FRML MDRD: >90 ML/MIN/{1.73_M2}
HCT VFR BLD AUTO: 48.2 % (ref 35–47)
HGB BLD-MCNC: 15.3 G/DL (ref 11.7–15.7)
MCH RBC QN AUTO: 31 PG (ref 26.5–33)
MCHC RBC AUTO-ENTMCNC: 31.7 G/DL (ref 31.5–36.5)
MCV RBC AUTO: 98 FL (ref 78–100)
PLATELET # BLD AUTO: 284 10E9/L (ref 150–450)
RBC # BLD AUTO: 4.94 10E12/L (ref 3.8–5.2)
WBC # BLD AUTO: 8.1 10E9/L (ref 4–11)

## 2019-06-26 PROCEDURE — 84460 ALANINE AMINO (ALT) (SGPT): CPT | Performed by: INTERNAL MEDICINE

## 2019-06-26 PROCEDURE — 82565 ASSAY OF CREATININE: CPT | Performed by: INTERNAL MEDICINE

## 2019-06-26 PROCEDURE — 84450 TRANSFERASE (AST) (SGOT): CPT | Performed by: INTERNAL MEDICINE

## 2019-06-26 PROCEDURE — G0463 HOSPITAL OUTPT CLINIC VISIT: HCPCS | Mod: ZF

## 2019-06-26 PROCEDURE — 85027 COMPLETE CBC AUTOMATED: CPT | Performed by: INTERNAL MEDICINE

## 2019-06-26 PROCEDURE — 99214 OFFICE O/P EST MOD 30 MIN: CPT | Performed by: PREVENTIVE MEDICINE

## 2019-06-26 PROCEDURE — 36415 COLL VENOUS BLD VENIPUNCTURE: CPT | Performed by: INTERNAL MEDICINE

## 2019-06-26 RX ORDER — METHOTREXATE 25 MG/ML
25 INJECTION, SOLUTION INTRA-ARTERIAL; INTRAMUSCULAR; INTRAVENOUS
Qty: 4 ML | Refills: 6 | Status: ON HOLD | OUTPATIENT
Start: 2019-06-26 | End: 2019-10-07

## 2019-06-26 RX ORDER — FOLIC ACID 1 MG/1
4 TABLET ORAL DAILY
Qty: 360 TABLET | Refills: 3 | Status: ON HOLD | OUTPATIENT
Start: 2019-06-26 | End: 2019-10-07

## 2019-06-26 RX ORDER — CYCLOBENZAPRINE HCL 10 MG
10 TABLET ORAL
Qty: 30 TABLET | Refills: 1 | Status: ON HOLD | OUTPATIENT
Start: 2019-06-26 | End: 2019-10-07

## 2019-06-26 RX ORDER — LEUCOVORIN CALCIUM 5 MG/1
5 TABLET ORAL
Qty: 4 TABLET | Refills: 3 | Status: ON HOLD | OUTPATIENT
Start: 2019-06-26 | End: 2019-10-07

## 2019-06-26 ASSESSMENT — PAIN SCALES - GENERAL
PAINLEVEL: EXTREME PAIN (8)
PAINLEVEL: EXTREME PAIN (8)

## 2019-06-26 ASSESSMENT — MIFFLIN-ST. JEOR: SCORE: 2506.39

## 2019-06-26 NOTE — PROGRESS NOTES
[facility]  Rheumatology Clinic  Everett Austin MD  2019     Name: Shira Rodriguez  MRN: [id number]  Age: 59 year old  : 1960  Referring provider: Anjel Busby     Assessment and Plan:     # Inflammatory Arthritis (+RF/+CCP, dx 2017 with diffuse polyarticular inflammatory flare):    Patient relates improvement in joint pain since increasing methotrexate (). Continues to have tenderness over the achilles tendon without visible swelling. There continues to be eczematous change on the palmar surface of both hands that are not uncomfortable and patient feels no reason to treat . The psoriasiform rash on the dorsal aspects of both hands is almost gone. Wrists are non tender with full ROM. LFTs, Cr, and CBC are pending.    Patient has a history of seropositive rheumatoid arthritis that is reasonably suppressed. Symptoms of tendonitis and polyarthralgia have diminished since increasing the methotrexate to 25mg/week. I recommend continuing the methotrexate at this dose as well as the folic acid (4mg/day) and leucovorin (5mg 1 day before methotrexate) to reduce potential mucositis effects.     Labs from this morning show that CBC, LFTs, and creatinine were all normal with the excepion of modestly elevated Hematocrit.     Plan:    - methotrexate 50 MG/2ML injection CHEMO  Dispense: 4 mL; Refill: 6   - leucovorin (WELLCOVORIN) 5 MG tablet  Dispense: 4 tablet; Refill: 3   - folic acid (FOLVITE) 1 MG tablet  Dispense: 360 tablet; Refill: 3   - Creatinine   - ALT/AST   - CBC with platelets    #Hypertension: was normotensive today and taking spironolactone and lisinopril, being followed by cardiology.    #DDD, lumbar spine: recently quit work because of pain, is following with Sports Medicine. Was able to begin PT (aquatherapy) 1 month ago.    #Tobacco use: Patient expresses desire to quit and has obtained a prescription for Chantix.       Follow-up: Return in about 4 months (around 10/26/2019).      Written by Fauzia Rausch MS3    I saw this patient with the medical student, who acted as scribe for me. I agree with the findings and recommendations.    Everett Austin M.D.  Staff Rheumatologist,  Health  Pager [phone]    HPI:   Shira Rodriguez is a 59 year old female with a history of rheumatoid arthritis who presents for follow up. She was last seen on 02/27/2019, at which time she had symptoms of asymmetric monoarticular tenosynovitis, Achilles tendon sheath inflammation, and psoriasiform skin eruptions suggesting a parallel process of seronegative spondyloarthropathy. There was low grade activity of inflammatory arthropathy. Plan was to increase methotrexate to 25 mg weekly subcutaneous and continue folic acid 4mg daily and leucovorin 5 mg 24 hours after each methotrexate dose. She was referred to dermatology for evaluation of skin eruptions.     She saw Dr. Richardson of pulmonology on 04/23/2019 for chronic cough. PFTs were borderline in 2015, which was attributed to morbid obesity and chronic bronchitis. She was started on a trial of antihistamine as well as Chantix. She was still adjusting to PAP therapy for CYRUS and needed a titration study.     Today, the patient reports prompt response to methotrexate increase. She is continuing to have achilles pain and back pain and is following with orthopedics for this. She notes that relief from her last injection lasted the longest. She has started physical therapy with aqua therapy and has noticed improvement, although this week her pain seems to be worse. She had to stop working in May due to the back pain. She has follow up with orthopedics later today. She also has some left elbow pain with pushing off of her walker but otherwise denies any joint pain. She has continued using folic acid and leucovorin and denies any oral ulcers. She has some leg swelling, which began on Sunday, and is on a diuretic. She continues to have scaling over the palms, which  has remained stable but is not painful, itchy, or bothersome. She has seen dermatology and was started on a cream for eczema but did not find this helpful and had difficulty sleeping with gloves on.     Rheumatological history:  Patient is a 58 year old female who was originally referred to rheumatology in 2/2017 by her PCP Dr. Busby for 3 months of BL hand pain thought consistent with CTS and positive RF/CCP. EMG showed moderate median neuropathy on L and severe on R, MRI c-spine normal. She received R CT injection by [employer] with 9 months of relief. She did describe intermittent episodes of swelling of her feet/ankles around this time period as well that resolved with medrol dose pack. She has a hx of L TKA and R TAWNY. She has chronic numbness of anterior BL shins following L TKA (was told they may have injected the nerve with marcaine around surgery years ago and numbness in R shin started in 2015 following a fall where developed large hematoma). When she was evaluated in 2/2017 she had no inflammatory arthritis symptoms and did not meet diagnosis of RA. We discussed starting plaquenil given it's role in potentially preventing pts with +RF/CCP from developing clinical RA, although she did not want to do this at the time. She was re-evaluated 8/2017 after developing polyarticular inflammatory arthritis of the right knee, ankles, wrists, hands, and shoulders. She was started on MTX, given depomedrol IM injection, oral prednisone, and given R knee CS injection. MTX increased and oral prednisone tapered to off in 9/2017. Had repeat R CT injection in 10/2017 that lasted until 1/2018. R knee significantly improved following CS injection 12/2017. Has persistent pain in L heel with enthesopathy on imaging, unclear if had true enthesitis in this region due to her RA. Participated in pool therapy winter 2018 with good response. At her 3/2018 OV she continued to have significant pain in her R wrist thought due to CT,  "but given some ongoing persistent EMS (BL hand stiffness lasting ALL day) as well and her known seropositive disease I recommended adding Humira to her regimen. This was ordered but she never started.     Review of Systems:   Pertinent items are noted in HPI or as below, remainder of complete ROS is negative.      No recent problems with hearing or vision. No swallowing problems.   No breathing difficulty, shortness of breath, coughing, or wheezing  No chest pain or palpitations  No heart burn, indigestion, abdominal pain, nausea, vomiting, diarrhea  No urination problems, no bloody, cloudy urine, no dysuria  No numbing, tingling, weakness  No headaches or confusion  No easy bleeding or bruising.     Active Medications:     Current Outpatient Medications:      DULoxetine (CYMBALTA) 60 MG EC capsule, TK ONE C PO  QAM, Disp: , Rfl: 0     folic acid (FOLVITE) 1 MG tablet, Take 4 tablets (4 mg) by mouth daily Take 3-4 tablets daily, Disp: 360 tablet, Rfl: 3     insulin syringe-needle U-100 (BD INSULIN SYRINGE ULTRAFINE) 30G X 1/2\" 1 ML, For methotrexate use weekly, Disp: 8 each, Rfl: prn     leucovorin (WELLCOVORIN) 5 MG tablet, Take 1 tablet (5 mg) by mouth every 7 days *take 24h after taking weekly methotrexate dose, Disp: 4 tablet, Rfl: 3     lisinopril (PRINIVIL/ZESTRIL) 10 MG tablet, Take 1 tablet (10 mg) by mouth daily, Disp: 90 tablet, Rfl: 3     methotrexate 50 MG/2ML injection CHEMO, Inject 1 mL (25 mg) Subcutaneous every 7 days Inject 0.8 mL (20 mg) weekly, Disp: 4 mL, Rfl: 6     methylPREDNISolone (MEDROL DOSEPAK) 4 MG tablet, Follow package instructions, Disp: 21 tablet, Rfl: 0     nystatin (MYCOSTATIN) 421301 UNIT/GM external powder, Apply to coat rash tid, neck and breast area rashes, Disp: 60 g, Rfl: 3     spironolactone (ALDACTONE) 25 MG tablet, Take 1 tablet (25 mg) by mouth daily, Disp: 90 tablet, Rfl: 3     varenicline (CHANTIX STARTING MONTH TANYA) 0.5 MG X 11 & 1 MG X 42 tablet, Take 0.5 mg by mouth " "daily AND 0.5 mg 2 times daily AND 1 mg 2 times daily., Disp: 53 tablet, Rfl: 0    Current Facility-Administered Medications:      lidocaine (PF) (XYLOCAINE) 1 % injection 50 mg, 5 mL, Other, Once, Rosita Chandra MD     methylPREDNISolone acetate (DEPO-MEDROL) injection 40 mg, 40 mg, INTRA-ARTICULAR, Once, Rosita Chandra MD      Allergies:   Adhesive tape and Erythromycin      Past Medical History:  Depression   Eczema   Hyperlipidemia   Hypertension   Morbid obesity   Osteoarthrosis right knee   Sleep apnea   Tobacco use disorder   Primary localized osteoarthrosis, pelvic region and thigh  Degeneration of cervical intervertebral disc     Past Surgical History:  Left total knee arthoplasty 6/14/2012   Right hip arthroplasty 07/09/2004      Family History:   Mother: thyroid disease, hypertension, skin cancer, CVA  Paternal grandmother: breast cancer, pancreatic cancer   Father: alcoholism   Sister: thyroid disease, alcoholism   Maternal grandmother: hypertension   Sister: heart disease, multiple ablations   Paternal grandfather: prostate cancer      Social History:   Current everyday cigarette smoker, 1 pack/day, 33 years, started 1982  No smokeless tobacco use  Occasional alcohol use     Physical Exam:   /75   Pulse 88   Temp 98  F (36.7  C) (Oral)   Ht 1.676 m (5' 6\")   Wt (!) 191 kg (421 lb)   SpO2 92%   BMI 67.95 kg/m     Wt Readings from Last 4 Encounters:   06/26/19 (!) 191 kg (421 lb)   05/23/19 (!) 195.9 kg (431 lb 14.4 oz)   05/20/19 (!) 198 kg (436 lb 8 oz)   04/29/19 (!) 194.6 kg (429 lb)     Constitutional: Well-developed, appearing stated age; cooperative  Eyes: Normal EOM, PERRLA, vision, conjunctiva, sclera  ENT: Normal external ears, nose, hearing, lips, teeth, gums, throat. Nice glistening saliva pool. No evidence of oral ulceration or aphthous ulcer.   Neck: No mass or thyroid enlargement  Resp: Lungs clear to auscultation, nl to palpation  CV: RRR, no murmurs, rubs or " gallops, no edema  GI: No ABD mass or tenderness, no HSM  : Not tested  Lymph: No cervical, supraclavicular, inguinal or epitrochlear nodes  MS: The wrist, MCP/PIP/DIP, ankle, and foot MTP/IP joints were examined and found normal. Some tenderness over the heel cord on the right without gross thickening.   Skin: Some scaling without erythematous base on the palms of both hands. There is some roughening and fine adherent scale with minimal raised area and no erythema. Some evidence of hyperpigmentation and brawny edema in both legs but very little pitting edema. There is just a slight waviness of several nails but no pitting or ridging.   Neuro: Normal cranial nerves, strength, sensation, DTRs.   Psych: Normal judgement, orientation, memory, affect.     Pulmonary function test 06/11/2019:   The FEV1 and FVC are reduced but the FEV1/FVC ratio is normal.  The inspiratory flow rates are reduced.  The diffusing capacity is normal.  However, the diffusing capacity was not corrected for the patient's hemoglobin.  The reduced volumes indicate a restrictive process.  IMPRESSION:  Moderate Restriction suggested on Spirometry. Need lung volume to confirm.    Normal Diffusion    Laboratory:   RHEUM RESULTS Latest Ref Rng & Units 4/23/2019 5/23/2019 6/26/2019   SED RATE 0 - 30 mm/h - - -   CRP, INFLAMMATION 0.0 - 8.0 mg/L - - -   RHEUMATOID FACTOR <20 IU/mL - - -   EMERY SCREEN BY EIA <1.0 - - -   AST 0 - 45 U/L 17 - 13   ALT 0 - 50 U/L 31 - 26   ALBUMIN 3.4 - 5.0 g/dL 3.3(L) - -   WBC 4.0 - 11.0 10e9/L 9.5 - 8.1   RBC 3.8 - 5.2 10e12/L 5.12 - 4.94   HGB 11.7 - 15.7 g/dL 16.1(H) - 15.3   HCT 35.0 - 47.0 % 49.7(H) - 48.2(H)   MCV 78 - 100 fl 97 - 98   MCHC 31.5 - 36.5 g/dL 32.4 - 31.7   RDW 10.0 - 15.0 % 14.4 - 14.5    - 450 10e9/L 259 - 284   CREATININE 0.52 - 1.04 mg/dL 0.67 0.59 0.64   GFR ESTIMATE, IF BLACK >60 mL/min/[1.73:m2] >90 >90 >90   GFR ESTIMATE >60 mL/min/[1.73:m2] >90 >90 >90    - 1,620 mg/dL - - -    IGA 70 - 380 mg/dL - - -   IGM 60 - 265 mg/dL - - -   HEPATITIS C ANTIBODY NR - - -       Rheumatoid Factor   Date Value Ref Range Status   08/28/2017 51 (H) <20 IU/mL Final   ,   Cyclic Citrullinated Peptide Antibody, IgG   Date Value Ref Range Status   08/28/2017 15 (H) <7 U/mL Final     Comment:     Positive   ,  ,  ,  ,  ,  ,   EMERY Screen by EIA   Date Value Ref Range Status   12/30/2016 <1.0  Interpretation:  Negative   <1.0 Final   ,  ,  ,  ,  ,  ,  ,   Hepatitis B Core Caron   Date Value Ref Range Status   04/12/2017 Nonreactive NR Final   ,   Hep B Surface Agn   Date Value Ref Range Status   04/12/2017 Nonreactive NR Final   ,  ,  ,  ,  ,  ,  ,  ,  ,  ,  ,  ,  ,  ,  ,  ,  ,  ,  ,  ,  ,  ,   IGG   Date Value Ref Range Status   04/21/2017 940 695 - 1,620 mg/dL Final     IGA   Date Value Ref Range Status   04/21/2017 271 70 - 380 mg/dL Final     IGM   Date Value Ref Range Status   04/21/2017 395 (H) 60 - 265 mg/dL Final   ,  ,  ,  ,  ,  ,  ,        Scribe Disclosure:  I, Abimbola Dupree, am serving as a scribe to document services personally performed by Everett Austin MD at this visit, based upon the provider's statements to me. All documentation has been reviewed by the aforementioned provider prior to being entered into the official medical record.

## 2019-06-26 NOTE — LETTER
"    6/26/2019         RE: Shira Rodriguez  [address] [address] [address]        Dear Colleague,    Thank you for referring your patient, Shira Rodriguez, to the [facility]. Please see a copy of my visit note below.    HISTORY OF PRESENT ILLNESS  Ms. Rodriguez is a pleasant 59 year old year old female who presents to clinic today with   Shira explains that   Location:   Quality:  achy pain      Duration:   Timing: occurs intermittently  Context: occurs while exercising and lifting  Modifying factors:  resting and non-use makes it better, movement and use makes it worse  Associated signs & symptoms: none  Previous similar pain: yes  Exercise: lifting weights and cardiovascular on machine  Additional history: as documented    MEDICAL HISTORY  Patient Active Problem List   Diagnosis     Primary localized osteoarthrosis, pelvic region and thigh     Adjustment disorder with depressed mood     Tobacco use disorder     Morbid obesity (H)     Arthritis of knee, degenerative     Degeneration of cervical intervertebral disc     CYRUS (obstructive sleep apnea)     Elevated blood pressure (not hypertension)       Current Outpatient Medications   Medication Sig Dispense Refill     folic acid (FOLVITE) 1 MG tablet Take 4 tablets (4 mg) by mouth daily Take 3-4 tablets daily 360 tablet 3     insulin syringe-needle U-100 (BD INSULIN SYRINGE ULTRAFINE) 30G X 1/2\" 1 ML For methotrexate use weekly 8 each prn     leucovorin (WELLCOVORIN) 5 MG tablet Take 1 tablet (5 mg) by mouth every 7 days *take 24h after taking weekly methotrexate dose 4 tablet 3     lisinopril (PRINIVIL/ZESTRIL) 10 MG tablet Take 1 tablet (10 mg) by mouth daily 90 tablet 3     methotrexate 50 MG/2ML injection CHEMO Inject 1 mL (25 mg) Subcutaneous every 7 days Inject 0.8 mL (20 mg) weekly 4 mL 6     nystatin (MYCOSTATIN) 480732 UNIT/GM external powder Apply to coat rash tid, neck and breast area rashes 60 g 3     " spironolactone (ALDACTONE) 25 MG tablet Take 1 tablet (25 mg) by mouth daily 90 tablet 3     DULoxetine (CYMBALTA) 60 MG EC capsule TK ONE C PO  QAM  0     methylPREDNISolone (MEDROL DOSEPAK) 4 MG tablet Follow package instructions (Patient not taking: Reported on 6/26/2019) 21 tablet 0     varenicline (CHANTIX STARTING MONTH TANYA) 0.5 MG X 11 & 1 MG X 42 tablet Take 0.5 mg by mouth daily AND 0.5 mg 2 times daily AND 1 mg 2 times daily. (Patient not taking: No sig reported) 53 tablet 0       Allergies   Allergen Reactions     Adhesive Tape Blisters     Erythromycin Rash       Family History   Problem Relation Age of Onset     Thyroid Disease Mother      Hypertension Mother      Skin Cancer Mother         MMohs surgery with skin graft     Cerebrovascular Disease Mother         CVA after endarderectomy     Diabetes Mother      Breast Cancer Paternal Grandmother      Pancreatic Cancer Paternal Grandmother      Cardiovascular Paternal Aunt      Cardiovascular Paternal Uncle      Depression Other      Alcoholism Father      Thyroid Disease Sister      Alcoholism Sister      Hypertension Maternal Grandmother      Heart Disease Sister         multiple ablations     Alcoholism Sister      Prostate Cancer Paternal Grandfather        Additional medical/Social/Surgical histories reviewed in [facility] and updated as appropriate.     REVIEW OF SYSTEMS (6/26/2019)  10 point ROS of systems including Constitutional, Eyes, Respiratory, Cardiovascular, Gastroenterology, Genitourinary, Integumentary, Musculoskeletal, Psychiatric were all negative except for pertinent positives noted in my HPI.     PHYSICAL EXAM  Vitals:    06/26/19 1304   BP: 134/80   BP Location: Left arm   Patient Position: Sitting   Cuff Size: Adult Regular   Pulse: 106   SpO2: 93%     Vital Signs: /80 (BP Location: Left arm, Patient Position: Sitting, Cuff Size: Adult Regular)   Pulse 106   SpO2 93%  Patient declined being weighed. There is no height or weight  on file to calculate BMI.    General  - normal appearance, in no obvious distress  CV  - normal peripheral perfusion  Pulm  - normal respiratory pattern, non-labored  Musculoskeletal - lumbar spine  - stance: normal gait without limp, no obvious leg length discrepancy, normal heel and toe walk  - inspection: normal bone and joint alignment, no obvious scoliosis  - palpation: no paravertebral or bony tenderness  - ROM: flexion exacerbates pain, normal extension, sidebending, rotation  - strength: lower extremities 5/5 in all planes  - special tests:  (+) straight leg raise  (+) slump test  Neuro  - patellar and Achilles DTRs 2+ bilaterally, lower extremity sensory deficit throughout L5 distribution, grossly normal coordination, normal muscle tone  Skin  - no ecchymosis, erythema, warmth, or induration, no obvious rash  Psych  - interactive, appropriate, normal mood and affect  ASSESSMENT & PLAN      Tashi Saxena MD, CAM    Again, thank you for allowing me to participate in the care of your patient.        Sincerely,        Tashi Saxena MD

## 2019-06-26 NOTE — PATIENT INSTRUCTIONS
Diagnosis: inflammatory arthritis, improved    Plan:   - Continue methotrexate 25 mg weekly and continue folic acid 4mg daily and leucovorin 5 mg 24 hours after each methotrexate dose  - bloodwork every 12 weeks.

## 2019-06-26 NOTE — PROGRESS NOTES
"HISTORY OF PRESENT ILLNESS  Ms. Rodriguez is a pleasant 59 year old year old female who presents to clinic today with ongoing lumbar pain  Has pain that radiates into legs  Has been going on for a while  Getting worse   Has not had recent lumbar MRI    MEDICAL HISTORY  Patient Active Problem List   Diagnosis     Primary localized osteoarthrosis, pelvic region and thigh     Adjustment disorder with depressed mood     Tobacco use disorder     Morbid obesity (H)     Arthritis of knee, degenerative     Degeneration of cervical intervertebral disc     CYRUS (obstructive sleep apnea)     Elevated blood pressure (not hypertension)       Current Outpatient Medications   Medication Sig Dispense Refill     folic acid (FOLVITE) 1 MG tablet Take 4 tablets (4 mg) by mouth daily Take 3-4 tablets daily 360 tablet 3     insulin syringe-needle U-100 (BD INSULIN SYRINGE ULTRAFINE) 30G X 1/2\" 1 ML For methotrexate use weekly 8 each prn     leucovorin (WELLCOVORIN) 5 MG tablet Take 1 tablet (5 mg) by mouth every 7 days *take 24h after taking weekly methotrexate dose 4 tablet 3     lisinopril (PRINIVIL/ZESTRIL) 10 MG tablet Take 1 tablet (10 mg) by mouth daily 90 tablet 3     methotrexate 50 MG/2ML injection CHEMO Inject 1 mL (25 mg) Subcutaneous every 7 days Inject 0.8 mL (20 mg) weekly 4 mL 6     nystatin (MYCOSTATIN) 821382 UNIT/GM external powder Apply to coat rash tid, neck and breast area rashes 60 g 3     spironolactone (ALDACTONE) 25 MG tablet Take 1 tablet (25 mg) by mouth daily 90 tablet 3     DULoxetine (CYMBALTA) 60 MG EC capsule TK ONE C PO  QAM  0     methylPREDNISolone (MEDROL DOSEPAK) 4 MG tablet Follow package instructions (Patient not taking: Reported on 6/26/2019) 21 tablet 0     varenicline (CHANTIX STARTING MONTH PAK) 0.5 MG X 11 & 1 MG X 42 tablet Take 0.5 mg by mouth daily AND 0.5 mg 2 times daily AND 1 mg 2 times daily. (Patient not taking: No sig reported) 53 tablet 0       Allergies   Allergen Reactions     " Adhesive Tape Blisters     Erythromycin Rash       Family History   Problem Relation Age of Onset     Thyroid Disease Mother      Hypertension Mother      Skin Cancer Mother         MMohs surgery with skin graft     Cerebrovascular Disease Mother         CVA after endarderectomy     Diabetes Mother      Breast Cancer Paternal Grandmother      Pancreatic Cancer Paternal Grandmother      Cardiovascular Paternal Aunt      Cardiovascular Paternal Uncle      Depression Other      Alcoholism Father      Thyroid Disease Sister      Alcoholism Sister      Hypertension Maternal Grandmother      Heart Disease Sister         multiple ablations     Alcoholism Sister      Prostate Cancer Paternal Grandfather        Additional medical/Social/Surgical histories reviewed in [facility] and updated as appropriate.     REVIEW OF SYSTEMS (6/26/2019)  10 point ROS of systems including Constitutional, Eyes, Respiratory, Cardiovascular, Gastroenterology, Genitourinary, Integumentary, Musculoskeletal, Psychiatric were all negative except for pertinent positives noted in my HPI.     PHYSICAL EXAM  Vitals:    06/26/19 1304   BP: 134/80   BP Location: Left arm   Patient Position: Sitting   Cuff Size: Adult Regular   Pulse: 106   SpO2: 93%     Vital Signs: /80 (BP Location: Left arm, Patient Position: Sitting, Cuff Size: Adult Regular)   Pulse 106   SpO2 93%  Patient declined being weighed. There is no height or weight on file to calculate BMI.    General  - normal appearance, in no obvious distress  CV  - normal peripheral perfusion  Pulm  - normal respiratory pattern, non-labored  Musculoskeletal - lumbar spine  - stance: normal gait without limp, no obvious leg length discrepancy, normal heel and toe walk  - inspection: normal bone and joint alignment, no obvious scoliosis  - palpation: no paravertebral or bony tenderness  - ROM: flexion exacerbates pain, normal extension, sidebending, rotation  - strength: lower extremities 5/5 in all  planes  - special tests:  (+) straight leg raise  (+) slump test  Neuro  - patellar and Achilles DTRs 2+ bilaterally, bilateral lower extremity sensory deficit throughout L5 distribution, grossly normal coordination, normal muscle tone  Skin  - no ecchymosis, erythema, warmth, or induration, no obvious rash  Psych  - interactive, appropriate, normal mood and affect  ASSESSMENT & PLAN  58 yo female lumbar ddd, radicular pain  Ordered lumbar MRI  Cyclobenzaprine RX  F/u after MRI  Consider MEAGHAN      Tashi Saxena MD, CAQSM

## 2019-06-26 NOTE — NURSING NOTE
Shira Rodriguez's chief complaint for this visit includes:  Chief Complaint   Patient presents with     Lower Back - Pain     Left - pain is worsening the last 2 months - relief from lumbar epidural injection on 1/31/19 lasted about 2 months     PCP: Anjel Busby    Referring Provider:  No referring provider defined for this encounter.    /80 (BP Location: Left arm, Patient Position: Sitting, Cuff Size: Adult Regular)   Pulse 106   SpO2 93%   Extreme Pain (8)     Do you need any medication refills at today's visit? Would like to discuss pain management.    Emily Mclain, CMA

## 2019-06-26 NOTE — NURSING NOTE
"Chief Complaint   Patient presents with     RECHECK     Rheumatoid arthritis      /75   Pulse 88   Temp 98  F (36.7  C) (Oral)   Ht 1.676 m (5' 6\")   Wt (!) 191 kg (421 lb)   SpO2 92%   BMI 67.95 kg/m    Mimi Hernandez CMA  6/26/2019 7:15 AM      "

## 2019-06-26 NOTE — LETTER
2019      RE: Shira Rodriguez  [address] [address] [address]       [facility]  Rheumatology Clinic  Everett Austin MD  2019     Name: hSira Rodriguez  MRN: [id number]  Age: 59 year old  : 1960  Referring provider: Anjel Busby     Assessment and Plan:     # Inflammatory Arthritis (+RF/+CCP, dx 2017 with diffuse polyarticular inflammatory flare):    Patient relates improvement in joint pain since increasing methotrexate (). Continues to have tenderness over the achilles tendon without visible swelling. There continues to be eczematous change on the palmar surface of both hands that are not uncomfortable and patient feels no reason to treat . The psoriasiform rash on the dorsal aspects of both hands is almost gone. Wrists are non tender with full ROM. LFTs, Cr, and CBC are pending.    Patient has a history of seropositive rheumatoid arthritis that is reasonably suppressed. Symptoms of tendonitis and polyarthralgia have diminished since increasing the methotrexate to 25mg/week. I recommend continuing the methotrexate at this dose as well as the folic acid (4mg/day) and leucovorin (5mg 1 day before methotrexate) to reduce potential mucositis effects.     Labs from this morning show that CBC, LFTs, and creatinine were all normal with the excepion of modestly elevated Hematocrit.     Plan:    - methotrexate 50 MG/2ML injection CHEMO  Dispense: 4 mL; Refill: 6   - leucovorin (WELLCOVORIN) 5 MG tablet  Dispense: 4 tablet; Refill: 3   - folic acid (FOLVITE) 1 MG tablet  Dispense: 360 tablet; Refill: 3   - Creatinine   - ALT/AST   - CBC with platelets    #Hypertension: was normotensive today and taking spironolactone and lisinopril, being followed by cardiology.    #DDD, lumbar spine: recently quit work because of pain, is following with Sports Medicine. Was able to begin PT (aquatherapy) 1 month ago.    #Tobacco use: Patient expresses desire to quit and has obtained a  prescription for Chantix.       Follow-up: Return in about 4 months (around 10/26/2019).     Written by Fauzia Rausch MS3    I saw this patient with the medical student, who acted as scribe for me. I agree with the findings and recommendations.    Everett Austin M.D.  Staff Rheumatologist, [facility]  Pager [phone]    HPI:   Shira Rodriguez is a 59 year old female with a history of rheumatoid arthritis who presents for follow up. She was last seen on 02/27/2019, at which time she had symptoms of asymmetric monoarticular tenosynovitis, Achilles tendon sheath inflammation, and psoriasiform skin eruptions suggesting a parallel process of seronegative spondyloarthropathy. There was low grade activity of inflammatory arthropathy. Plan was to increase methotrexate to 25 mg weekly subcutaneous and continue folic acid 4mg daily and leucovorin 5 mg 24 hours after each methotrexate dose . She was referred to dermatology for evaluation of skin eruptions.     She saw Dr. Richardson of pulmonology on 04/23/2019 for chronic cough. PFTs were borderline in 2015, which was attributed to morbid obesity and chronic bronchitis. She was started on a trial of antihistamine as well as Chantix. She was still adjusting to PAP therapy for CYRUS and needed a titration study.     Today, the patient reports prompt response to methotrexate increase. She is continuing to have achilles pain and back pain and is following with orthopedics for this. She notes that relief from her last injection lasted the longest. She has started physical therapy with aqua therapy and has noticed improvement, although this week her pain seems to be worse. She had to stop working in May due to the back pain. She has follow up with orthopedics later today. She also has some left elbow pain with pushing off of her walker but otherwise denies any joint pain. She has continued using folic acid and leucovorin and denies any oral ulcers. She has some leg swelling, which  began on Sunday, and is on a diuretic. She continues to have scaling over the palms, which has remained stable but is not painful, itchy, or bothersome. She has seen dermatology and was started on a cream for eczema but did not find this helpful and had difficulty sleeping with gloves on.     Rheumatological history:  Patient is a 58 year old female who was originally referred to rheumatology in 2/2017 by her PCP Dr. Busby for 3 months of BL hand pain thought consistent with CTS and positive RF/CCP. EMG showed moderate median neuropathy on L and severe on R, MRI c-spine normal. She received R CT injection by [facility] with 9 months of relief. She did describe intermittent episodes of swelling of her feet/ankles around this time period as well that resolved with medrol dose pack. She has a hx of L TKA and R TAWNY. She has chronic numbness of anterior BL shins following L TKA (was told they may have injected the nerve with marcaine around surgery years ago and numbness in R shin started in 2015 following a fall where developed large hematoma). When she was evaluated in 2/2017 she had no inflammatory arthritis symptoms and did not meet diagnosis of RA. We discussed starting plaquenil given it's role in potentially preventing pts with +RF/CCP from developing clinical RA, although she did not want to do this at the time. She was re-evaluated 8/2017 after developing polyarticular inflammatory arthritis of the right knee, ankles, wrists, hands, and shoulders. She was started on MTX, given depomedrol IM injection, oral prednisone, and given R knee CS injection. MTX increased and oral prednisone tapered to off in 9/2017. Had repeat R CT injection in 10/2017 that lasted until 1/2018. R knee significantly improved following CS injection 12/2017. Has persistent pain in L heel with enthesopathy on imaging, unclear if had true enthesitis in this region due to her RA. Participated in pool therapy winter 2018 with good response.  "At her 3/2018 OV she continued to have significant pain in her R wrist thought due to CT, but given some ongoing persistent EMS (BL hand stiffness lasting ALL day) as well and her known seropositive disease I recommended adding Humira to her regimen. This was ordered but she never started.     Review of Systems:   Pertinent items are noted in HPI or as below, remainder of complete ROS is negative.      No recent problems with hearing or vision. No swallowing problems.   No breathing difficulty, shortness of breath, coughing, or wheezing  No chest pain or palpitations  No heart burn, indigestion, abdominal pain, nausea, vomiting, diarrhea  No urination problems, no bloody, cloudy urine, no dysuria  No numbing, tingling, weakness  No headaches or confusion  No easy bleeding or bruising.     Active Medications:     Current Outpatient Medications:      DULoxetine (CYMBALTA) 60 MG EC capsule, TK ONE C PO  QAM, Disp: , Rfl: 0     folic acid (FOLVITE) 1 MG tablet, Take 4 tablets (4 mg) by mouth daily Take 3-4 tablets daily, Disp: 360 tablet, Rfl: 3     insulin syringe-needle U-100 (BD INSULIN SYRINGE ULTRAFINE) 30G X 1/2\" 1 ML, For methotrexate use weekly, Disp: 8 each, Rfl: prn     leucovorin (WELLCOVORIN) 5 MG tablet, Take 1 tablet (5 mg) by mouth every 7 days *take 24h after taking weekly methotrexate dose, Disp: 4 tablet, Rfl: 3     lisinopril (PRINIVIL/ZESTRIL) 10 MG tablet, Take 1 tablet (10 mg) by mouth daily, Disp: 90 tablet, Rfl: 3     methotrexate 50 MG/2ML injection CHEMO, Inject 1 mL (25 mg) Subcutaneous every 7 days Inject 0.8 mL (20 mg) weekly, Disp: 4 mL, Rfl: 6     methylPREDNISolone (MEDROL DOSEPAK) 4 MG tablet, Follow package instructions, Disp: 21 tablet, Rfl: 0     nystatin (MYCOSTATIN) 053236 UNIT/GM external powder, Apply to coat rash tid, neck and breast area rashes, Disp: 60 g, Rfl: 3     spironolactone (ALDACTONE) 25 MG tablet, Take 1 tablet (25 mg) by mouth daily, Disp: 90 tablet, Rfl: 3     " "varenicline (CHANTIX STARTING MONTH PAK) 0.5 MG X 11 & 1 MG X 42 tablet, Take 0.5 mg by mouth daily AND 0.5 mg 2 times daily AND 1 mg 2 times daily., Disp: 53 tablet, Rfl: 0    Current Facility-Administered Medications:      lidocaine (PF) (XYLOCAINE) 1 % injection 50 mg, 5 mL, Other, Once, Rosita Chandra MD     methylPREDNISolone acetate (DEPO-MEDROL) injection 40 mg, 40 mg, INTRA-ARTICULAR, Once, Rosita Chandra MD      Allergies:   Adhesive tape and Erythromycin      Past Medical History:  Depression   Eczema   Hyperlipidemia   Hypertension   Morbid obesity   Osteoarthrosis right knee   Sleep apnea   Tobacco use disorder   Primary localized osteoarthrosis, pelvic region and thigh  Degeneration of cervical intervertebral disc     Past Surgical History:  Left total knee arthoplasty 6/14/2012   Right hip arthroplasty 07/09/2004      Family History:   Mother: thyroid disease, hypertension, skin cancer, CVA  Paternal grandmother: breast cancer, pancreatic cancer   Father: alcoholism   Sister: thyroid disease, alcoholism   Maternal grandmother: hypertension   Sister: heart disease, multiple ablations   Paternal grandfather: prostate cancer      Social History:   Current everyday cigarette smoker, 1 pack/day, 33 years, started 1982  No smokeless tobacco use  Occasional alcohol use     Physical Exam:   /75   Pulse 88   Temp 98  F (36.7  C) (Oral)   Ht 1.676 m (5' 6\")   Wt (!) 191 kg (421 lb)   SpO2 92%   BMI 67.95 kg/m      Wt Readings from Last 4 Encounters:   06/26/19 (!) 191 kg (421 lb)   05/23/19 (!) 195.9 kg (431 lb 14.4 oz)   05/20/19 (!) 198 kg (436 lb 8 oz)   04/29/19 (!) 194.6 kg (429 lb)     Constitutional: Well-developed, appearing stated age; cooperative  Eyes: Normal EOM, PERRLA, vision, conjunctiva, sclera  ENT: Normal external ears, nose, hearing, lips, teeth, gums, throat. Nice glistening saliva pool. No evidence of oral ulceration or aphthous ulcer.   Neck: No mass or thyroid " enlargement  Resp: Lungs clear to auscultation, nl to palpation  CV: RRR, no murmurs, rubs or gallops, no edema  GI: No ABD mass or tenderness, no HSM  : Not tested  Lymph: No cervical, supraclavicular, inguinal or epitrochlear nodes  MS: The wrist, MCP/PIP/DIP, ankle, and foot MTP/IP joints were examined and found normal. Some tenderness over the heel cord on the right without gross thickening.   Skin: Some scaling without erythematous base on the palms of both hands. There is some roughening and fine adherent scale with minimal raised area and no erythema. Some evidence of hyperpigmentation and brawny edema in both legs but very little pitting edema. There is just a slight waviness of several nails but no pitting or ridging.   Neuro: Normal cranial nerves, strength, sensation, DTRs.   Psych: Normal judgement, orientation, memory, affect.     Pulmonary function test 06/11/2019:   The FEV1 and FVC are reduced but the FEV1/FVC ratio is normal.  The inspiratory flow rates are reduced.  The diffusing capacity is normal.  However, the diffusing capacity was not corrected for the patient's hemoglobin.  The reduced volumes indicate a restrictive process.  IMPRESSION:  Moderate Restriction suggested on Spirometry. Need lung volume to confirm.    Normal Diffusion    Laboratory:   RHEUM RESULTS Latest Ref Rng & Units 4/23/2019 5/23/2019 6/26/2019   SED RATE 0 - 30 mm/h - - -   CRP, INFLAMMATION 0.0 - 8.0 mg/L - - -   RHEUMATOID FACTOR <20 IU/mL - - -   EMERY SCREEN BY EIA <1.0 - - -   AST 0 - 45 U/L 17 - 13   ALT 0 - 50 U/L 31 - 26   ALBUMIN 3.4 - 5.0 g/dL 3.3(L) - -   WBC 4.0 - 11.0 10e9/L 9.5 - 8.1   RBC 3.8 - 5.2 10e12/L 5.12 - 4.94   HGB 11.7 - 15.7 g/dL 16.1(H) - 15.3   HCT 35.0 - 47.0 % 49.7(H) - 48.2(H)   MCV 78 - 100 fl 97 - 98   MCHC 31.5 - 36.5 g/dL 32.4 - 31.7   RDW 10.0 - 15.0 % 14.4 - 14.5    - 450 10e9/L 259 - 284   CREATININE 0.52 - 1.04 mg/dL 0.67 0.59 0.64   GFR ESTIMATE, IF BLACK >60 mL/min/[1.73:m2]  >90 >90 >90   GFR ESTIMATE >60 mL/min/[1.73:m2] >90 >90 >90    - 1,620 mg/dL - - -   IGA 70 - 380 mg/dL - - -   IGM 60 - 265 mg/dL - - -   HEPATITIS C ANTIBODY NR - - -       Rheumatoid Factor   Date Value Ref Range Status   08/28/2017 51 (H) <20 IU/mL Final   ,   Cyclic Citrullinated Peptide Antibody, IgG   Date Value Ref Range Status   08/28/2017 15 (H) <7 U/mL Final     Comment:     Positive   ,  ,  ,  ,  ,  ,   EMERY Screen by EIA   Date Value Ref Range Status   12/30/2016 <1.0  Interpretation:  Negative   <1.0 Final   ,  ,  ,  ,  ,  ,  ,   Hepatitis B Core Caron   Date Value Ref Range Status   04/12/2017 Nonreactive NR Final   ,   Hep B Surface Agn   Date Value Ref Range Status   04/12/2017 Nonreactive NR Final   ,  ,  ,  ,  ,  ,  ,  ,  ,  ,  ,  ,  ,  ,  ,  ,  ,  ,  ,  ,  ,  ,   IGG   Date Value Ref Range Status   04/21/2017 940 695 - 1,620 mg/dL Final     IGA   Date Value Ref Range Status   04/21/2017 271 70 - 380 mg/dL Final     IGM   Date Value Ref Range Status   04/21/2017 395 (H) 60 - 265 mg/dL Final   ,  ,  ,  ,  ,  ,  ,        Scribe Disclosure:  IAbimbola, am serving as a scribe to document services personally performed by Everett Austin MD at this visit, based upon the provider's statements to me. All documentation has been reviewed by the aforementioned provider prior to being entered into the official medical record.       Everett Austin MD

## 2019-06-26 NOTE — PATIENT INSTRUCTIONS
Thanks for coming today.  Ortho/Sports Medicine Clinic  [address] [place] [address]    To schedule future appointments in Ortho Clinic, you may call [phone].    To schedule ordered imaging by your provider:   Call Central Imaging [phone]    To schedule an injection ordered by your provider:  Call Central [facility] Injection scheduling line: [phone]  EvalYou available online at:  [email or web address]    Please call if any further questions or concerns ([phone]).  Clinic hours 8 am to 5 pm.    Return to clinic (call) if symptoms worsen or fail to improve.    Preventive Care:    Colorectal Cancer Screening: During our visit today, we discussed that it is recommended you receive colorectal cancer screening. Please call or make an appointment with your primary care provider to discuss this. You may also call the [facility] scheduling line ([phone]) to set up a colonoscopy appointment.

## 2019-07-03 ENCOUNTER — ANCILLARY PROCEDURE (OUTPATIENT)
Dept: MRI IMAGING | Facility: CLINIC | Age: 59
End: 2019-07-03
Attending: PREVENTIVE MEDICINE
Payer: COMMERCIAL

## 2019-07-03 DIAGNOSIS — M51.369 LUMBAR DEGENERATIVE DISC DISEASE: ICD-10-CM

## 2019-07-03 DIAGNOSIS — M54.16 LUMBAR RADICULAR PAIN: ICD-10-CM

## 2019-07-13 DIAGNOSIS — I10 ESSENTIAL HYPERTENSION: ICD-10-CM

## 2019-07-16 RX ORDER — LISINOPRIL 10 MG/1
TABLET ORAL
Qty: 90 TABLET | Refills: 0 | Status: ON HOLD | OUTPATIENT
Start: 2019-07-16 | End: 2019-10-07

## 2019-07-26 ENCOUNTER — TRANSFERRED RECORDS (OUTPATIENT)
Dept: HEALTH INFORMATION MANAGEMENT | Facility: CLINIC | Age: 59
End: 2019-07-26

## 2019-08-05 DIAGNOSIS — M51.16 LUMBAR DISC HERNIATION WITH RADICULOPATHY: Primary | ICD-10-CM

## 2019-08-12 ENCOUNTER — HOSPITAL ENCOUNTER (OUTPATIENT)
Facility: CLINIC | Age: 59
Discharge: HOME | End: 2019-08-12
Admitting: PREVENTIVE MEDICINE
Payer: COMMERCIAL

## 2019-08-12 ENCOUNTER — HOSPITAL ENCOUNTER (OUTPATIENT)
Dept: GENERAL RADIOLOGY | Facility: CLINIC | Age: 59
End: 2019-08-12
Attending: PREVENTIVE MEDICINE
Payer: COMMERCIAL

## 2019-08-12 VITALS
TEMPERATURE: 97 F | DIASTOLIC BLOOD PRESSURE: 81 MMHG | HEART RATE: 84 BPM | WEIGHT: 293 LBS | RESPIRATION RATE: 16 BRPM | OXYGEN SATURATION: 92 % | BODY MASS INDEX: 47.09 KG/M2 | SYSTOLIC BLOOD PRESSURE: 132 MMHG | HEIGHT: 66 IN

## 2019-08-12 DIAGNOSIS — M51.16 LUMBAR DISC HERNIATION WITH RADICULOPATHY: ICD-10-CM

## 2019-08-12 PROCEDURE — 25000125 ZZHC RX 250: Performed by: PHYSICIAN ASSISTANT

## 2019-08-12 PROCEDURE — 25000128 H RX IP 250 OP 636: Performed by: PHYSICIAN ASSISTANT

## 2019-08-12 PROCEDURE — 40000863 ZZH STATISTIC RADIOLOGY XRAY, US, CT, MAR, NM

## 2019-08-12 PROCEDURE — 62323 NJX INTERLAMINAR LMBR/SAC: CPT

## 2019-08-12 PROCEDURE — 85610 PROTHROMBIN TIME: CPT | Performed by: RADIOLOGY

## 2019-08-12 PROCEDURE — 25500064 ZZH RX 255 OP 636: Performed by: PHYSICIAN ASSISTANT

## 2019-08-12 RX ORDER — DEXTROSE MONOHYDRATE 25 G/50ML
25-50 INJECTION, SOLUTION INTRAVENOUS
Status: CANCELLED | OUTPATIENT
Start: 2019-08-12

## 2019-08-12 RX ORDER — LIDOCAINE 40 MG/G
CREAM TOPICAL
Status: DISCONTINUED | OUTPATIENT
Start: 2019-08-12 | End: 2019-08-12 | Stop reason: HOSPADM

## 2019-08-12 RX ORDER — LIDOCAINE HYDROCHLORIDE 10 MG/ML
30 INJECTION, SOLUTION EPIDURAL; INFILTRATION; INTRACAUDAL; PERINEURAL ONCE
Status: COMPLETED | OUTPATIENT
Start: 2019-08-12 | End: 2019-08-12

## 2019-08-12 RX ORDER — LIDOCAINE HYDROCHLORIDE 10 MG/ML
5 INJECTION, SOLUTION EPIDURAL; INFILTRATION; INTRACAUDAL; PERINEURAL ONCE
Status: COMPLETED | OUTPATIENT
Start: 2019-08-12 | End: 2019-08-12

## 2019-08-12 RX ORDER — NICOTINE POLACRILEX 4 MG
15-30 LOZENGE BUCCAL
Status: CANCELLED | OUTPATIENT
Start: 2019-08-12

## 2019-08-12 RX ORDER — DEXAMETHASONE SODIUM PHOSPHATE 10 MG/ML
20 INJECTION, SOLUTION INTRAMUSCULAR; INTRAVENOUS ONCE
Status: COMPLETED | OUTPATIENT
Start: 2019-08-12 | End: 2019-08-12

## 2019-08-12 RX ORDER — DEXTROSE MONOHYDRATE 25 G/50ML
25-50 INJECTION, SOLUTION INTRAVENOUS
Status: DISCONTINUED | OUTPATIENT
Start: 2019-08-12 | End: 2019-08-12 | Stop reason: HOSPADM

## 2019-08-12 RX ORDER — NICOTINE POLACRILEX 4 MG
15-30 LOZENGE BUCCAL
Status: DISCONTINUED | OUTPATIENT
Start: 2019-08-12 | End: 2019-08-12 | Stop reason: HOSPADM

## 2019-08-12 RX ORDER — IOPAMIDOL 408 MG/ML
10 INJECTION, SOLUTION INTRATHECAL ONCE
Status: COMPLETED | OUTPATIENT
Start: 2019-08-12 | End: 2019-08-12

## 2019-08-12 RX ADMIN — LIDOCAINE HYDROCHLORIDE 4.5 ML: 10 INJECTION, SOLUTION EPIDURAL; INFILTRATION; INTRACAUDAL; PERINEURAL at 09:46

## 2019-08-12 RX ADMIN — LIDOCAINE HYDROCHLORIDE 3 ML: 10 INJECTION, SOLUTION EPIDURAL; INFILTRATION; INTRACAUDAL; PERINEURAL at 09:42

## 2019-08-12 RX ADMIN — IOPAMIDOL 0.5 ML: 408 INJECTION, SOLUTION INTRATHECAL at 09:45

## 2019-08-12 RX ADMIN — DEXAMETHASONE SODIUM PHOSPHATE 20 MG: 10 INJECTION, SOLUTION INTRAMUSCULAR; INTRAVENOUS at 09:46

## 2019-08-12 ASSESSMENT — MIFFLIN-ST. JEOR: SCORE: 2411.14

## 2019-08-12 NOTE — PROGRESS NOTES
Care Suites Discharge Nursing Note    Education/questions answered: yes  Patient DC location: home  Accompanied by: sister Kirsten RODRIGUEZ discharge time: 1050    Site CDI. Some numbness in L leg. Provider aware.

## 2019-08-12 NOTE — PROGRESS NOTES
RADIOLOGY PROCEDURE NOTE  Patient name: Shira Rodriguez  MRN: [id number]  : 1960    Pre-procedure diagnosis: Back and bilateral leg pain  Post-procedure diagnosis: Same    Procedure Date/Time: 2019  9:55 AM  Procedure: Left L4-L5 TFESI  Estimated blood loss: None  Specimen(s) collected with description: none  The patient tolerated the procedure well with no immediate complications.  Significant findings:none    See imaging dictation for procedural details.    Provider name: Karlo Queen  Assistant(s):None

## 2019-08-12 NOTE — PROGRESS NOTES
Care Suites Post-Procedure Note    Procedure: epidural injection  CS arrival time: 1005  Accompanied by: radiology RN  Concerns/abnormal assessment after procedure: none  Plan: up in 15 min to reassess pain

## 2019-08-12 NOTE — PROGRESS NOTES
Care Suites Admission Nursing Note    Reason for admission: epidural injection  CS arrival time: 0832  Accompanied by: self  Name/phone of DC : [facility] [phone]  Medications held: none  Consent signed: pending  Abnormal assessment/labs:   If abnormal, provider notified:   Education/questions answered: yes  Plan: MEAGHAN

## 2019-08-12 NOTE — DISCHARGE INSTRUCTIONS
Steroid Injection Discharge Instructions     After you go home:      You may resume your normal diet.    Care of Puncture Site:      If you have a bandaid on your puncture site, you may remove it the next morning    You may shower tomorrow    No bath tubs, whirlpools or swimming for at least 3 days     Activity:      You may go back to normal activity in 24 hours    You should let pain be your guide as to the extent of your activities    Maintain any activity limitations as ordered by your provider    Do NOT drive a vehicle if you develop numbness in your arm or leg    Medicines:      You may resume all medications    Resume your Warfarin/Coumadin at your regular dose today. Follow up with your provider to have your INR rechecked    Resume your Platelet Inhibitors and Aspirin tomorrow at your regular dose    For minor pain, you may take Acetaminophen (Tylenol) or Ibuprofen (Advil)    Pain:       You may experience increased or different pain over the next 24-48 hours    For the next 48 hrs - you may use ice packs for discomfort     Call your primary care doctor if:      You have severe pain that does not improve with pain medication    You have chills or a fever greater than 101 F (38 C)    The site is red, swollen, hot or tender    New problems with your bowel or bladder    Any questions or concerns    Other Instructions:      New numbness down your leg post injection is temporary and may last for up to 6 hours. You may need assistance with activity until your leg has normal sensation.    If you are diabetic, monitor your blood sugar closely. Contact the provider who manages your diabetes to help you control your blood sugar if needed.    For Your Information:      A steroid was injected to help decrease swelling and may help to reduce pain. It may take up to 7-10 days to obtain full results.    Some patients will get lasting relief from a single injection. Others may require up to 3 injections to get results. If  you have more than one steroid injection, they should be given 2 weeks apart.    Side effects of your steroid injection are mild and will go away in 2-3 days  - Insomnia  - Heartburn  - Flushed face  - Water retention  - Increased appetite  - Increased blood sugar      If you have questions call:        [facility] Radiology Dept @ [phone]      The provider who performed your procedure was ___Dr. Pelletier______________.

## 2019-08-30 ENCOUNTER — DOCUMENTATION ONLY (OUTPATIENT)
Dept: CARE COORDINATION | Facility: CLINIC | Age: 59
End: 2019-08-30

## 2019-09-09 ENCOUNTER — APPOINTMENT (OUTPATIENT)
Dept: GENERAL RADIOLOGY | Facility: CLINIC | Age: 59
End: 2019-09-09
Attending: EMERGENCY MEDICINE
Payer: COMMERCIAL

## 2019-09-09 ENCOUNTER — APPOINTMENT (OUTPATIENT)
Dept: CT IMAGING | Facility: CLINIC | Age: 59
End: 2019-09-09
Attending: EMERGENCY MEDICINE
Payer: COMMERCIAL

## 2019-09-09 ENCOUNTER — HOSPITAL ENCOUNTER (OUTPATIENT)
Facility: CLINIC | Age: 59
Discharge: ANOTHER HOSPITAL | End: 2019-09-09
Attending: EMERGENCY MEDICINE | Admitting: INTERNAL MEDICINE
Payer: COMMERCIAL

## 2019-09-09 ENCOUNTER — HOSPITAL ENCOUNTER (INPATIENT)
Facility: CLINIC | Age: 59
LOS: 28 days | Discharge: NURSING, REHAB OR LONG-TERM CARE | DRG: 003 | End: 2019-10-07
Admitting: INTERNAL MEDICINE
Payer: COMMERCIAL

## 2019-09-09 ENCOUNTER — APPOINTMENT (OUTPATIENT)
Dept: INTERVENTIONAL RADIOLOGY/VASCULAR | Facility: CLINIC | Age: 59
DRG: 003 | End: 2019-09-09
Payer: COMMERCIAL

## 2019-09-09 ENCOUNTER — APPOINTMENT (OUTPATIENT)
Dept: CARDIOLOGY | Facility: CLINIC | Age: 59
End: 2019-09-09
Attending: EMERGENCY MEDICINE
Payer: COMMERCIAL

## 2019-09-09 VITALS
OXYGEN SATURATION: 96 % | HEART RATE: 130 BPM | BODY MASS INDEX: 63.55 KG/M2 | DIASTOLIC BLOOD PRESSURE: 77 MMHG | SYSTOLIC BLOOD PRESSURE: 119 MMHG | RESPIRATION RATE: 16 BRPM | TEMPERATURE: 96.4 F | WEIGHT: 293 LBS

## 2019-09-09 DIAGNOSIS — Z79.631 METHOTREXATE, LONG TERM, CURRENT USE: ICD-10-CM

## 2019-09-09 DIAGNOSIS — M05.741 RHEUMATOID ARTHRITIS INVOLVING BOTH HANDS WITH POSITIVE RHEUMATOID FACTOR (H): ICD-10-CM

## 2019-09-09 DIAGNOSIS — M05.742 RHEUMATOID ARTHRITIS INVOLVING BOTH HANDS WITH POSITIVE RHEUMATOID FACTOR (H): ICD-10-CM

## 2019-09-09 DIAGNOSIS — Z79.899 HIGH RISK MEDICATION USE: ICD-10-CM

## 2019-09-09 DIAGNOSIS — M15.0 PRIMARY OSTEOARTHRITIS INVOLVING MULTIPLE JOINTS: ICD-10-CM

## 2019-09-09 DIAGNOSIS — T45.1X5A ADVERSE EFFECT OF METHOTREXATE, INITIAL ENCOUNTER: ICD-10-CM

## 2019-09-09 DIAGNOSIS — I46.9 CARDIAC ARREST (H): ICD-10-CM

## 2019-09-09 DIAGNOSIS — M54.16 LUMBAR RADICULAR PAIN: ICD-10-CM

## 2019-09-09 DIAGNOSIS — E78.5 HYPERLIPIDEMIA LDL GOAL <100: ICD-10-CM

## 2019-09-09 DIAGNOSIS — R06.09 DOE (DYSPNEA ON EXERTION): ICD-10-CM

## 2019-09-09 DIAGNOSIS — R06.02 SHORTNESS OF BREATH: ICD-10-CM

## 2019-09-09 DIAGNOSIS — I26.99 BILATERAL PULMONARY EMBOLISM (H): ICD-10-CM

## 2019-09-09 DIAGNOSIS — Z72.0 TOBACCO ABUSE: ICD-10-CM

## 2019-09-09 DIAGNOSIS — I10 ESSENTIAL HYPERTENSION: ICD-10-CM

## 2019-09-09 DIAGNOSIS — Z71.6 TOBACCO ABUSE COUNSELING: ICD-10-CM

## 2019-09-09 DIAGNOSIS — R21 RASH: ICD-10-CM

## 2019-09-09 DIAGNOSIS — K21.00 GASTROESOPHAGEAL REFLUX DISEASE WITH ESOPHAGITIS: ICD-10-CM

## 2019-09-09 DIAGNOSIS — R00.1 BRADYCARDIA: ICD-10-CM

## 2019-09-09 DIAGNOSIS — I26.09 PULMONARY EMBOLISM WITH ACUTE COR PULMONALE, UNSPECIFIED CHRONICITY, UNSPECIFIED PULMONARY EMBOLISM TYPE (H): Primary | ICD-10-CM

## 2019-09-09 DIAGNOSIS — E66.01 MORBID OBESITY (H): Chronic | ICD-10-CM

## 2019-09-09 DIAGNOSIS — M05.79 RHEUMATOID ARTHRITIS INVOLVING MULTIPLE SITES WITH POSITIVE RHEUMATOID FACTOR (H): ICD-10-CM

## 2019-09-09 LAB
ALBUMIN SERPL-MCNC: 3.4 G/DL (ref 3.4–5)
ALP SERPL-CCNC: 82 U/L (ref 40–150)
ALT SERPL W P-5'-P-CCNC: 41 U/L (ref 0–50)
ANION GAP SERPL CALCULATED.3IONS-SCNC: 15 MMOL/L (ref 3–14)
AST SERPL W P-5'-P-CCNC: 39 U/L (ref 0–45)
BASOPHILS # BLD AUTO: 0 10E9/L (ref 0–0.2)
BASOPHILS NFR BLD AUTO: 0.2 %
BILIRUB SERPL-MCNC: 0.5 MG/DL (ref 0.2–1.3)
BUN SERPL-MCNC: 17 MG/DL (ref 7–30)
CA-I BLD-SCNC: 4.2 MG/DL (ref 4.4–5.2)
CA-I BLD-SCNC: 4.3 MG/DL (ref 4.4–5.2)
CA-I BLD-SCNC: 4.5 MG/DL (ref 4.4–5.2)
CALCIUM SERPL-MCNC: 10.1 MG/DL (ref 8.5–10.1)
CHLORIDE SERPL-SCNC: 101 MMOL/L (ref 94–109)
CO2 BLD-SCNC: 14 MMOL/L (ref 21–28)
CO2 BLD-SCNC: 15 MMOL/L (ref 21–28)
CO2 BLD-SCNC: 16 MMOL/L (ref 21–28)
CO2 BLD-SCNC: 17 MMOL/L (ref 21–28)
CO2 BLD-SCNC: 21 MMOL/L (ref 21–28)
CO2 BLD-SCNC: 22 MMOL/L (ref 21–28)
CO2 BLDCOV-SCNC: 19 MMOL/L (ref 21–28)
CO2 SERPL-SCNC: 21 MMOL/L (ref 20–32)
CREAT SERPL-MCNC: 1.28 MG/DL (ref 0.52–1.04)
D DIMER PPP FEU-MCNC: >20 UG/ML FEU (ref 0–0.5)
DIFFERENTIAL METHOD BLD: ABNORMAL
EOSINOPHIL # BLD AUTO: 0 10E9/L (ref 0–0.7)
EOSINOPHIL NFR BLD AUTO: 0.1 %
ERYTHROCYTE [DISTWIDTH] IN BLOOD BY AUTOMATED COUNT: 15.4 % (ref 10–15)
GFR SERPL CREATININE-BSD FRML MDRD: 46 ML/MIN/{1.73_M2}
GLUCOSE BLD-MCNC: 295 MG/DL (ref 70–99)
GLUCOSE BLD-MCNC: 297 MG/DL (ref 70–99)
GLUCOSE BLD-MCNC: 307 MG/DL (ref 70–99)
GLUCOSE BLDC GLUCOMTR-MCNC: 301 MG/DL (ref 70–99)
GLUCOSE SERPL-MCNC: 258 MG/DL (ref 70–99)
HCT VFR BLD AUTO: 51.9 % (ref 35–47)
HCT VFR BLD CALC: 44 %PCV (ref 35–47)
HCT VFR BLD CALC: 45 %PCV (ref 35–47)
HCT VFR BLD CALC: 50 %PCV (ref 35–47)
HGB BLD CALC-MCNC: 15 G/DL (ref 11.7–15.7)
HGB BLD CALC-MCNC: 15.3 G/DL (ref 11.7–15.7)
HGB BLD CALC-MCNC: 17 G/DL (ref 11.7–15.7)
HGB BLD-MCNC: 16.7 G/DL (ref 11.7–15.7)
IMM GRANULOCYTES # BLD: 0.7 10E9/L (ref 0–0.4)
IMM GRANULOCYTES NFR BLD: 2.8 %
INR PPP: 1.23 (ref 0.86–1.14)
INTERPRETATION ECG - MUSE: NORMAL
INTERPRETATION ECG - MUSE: NORMAL
KCT BLD-ACNC: 231 SEC (ref 75–150)
KCT BLD-ACNC: 409 SEC (ref 75–150)
LACTATE BLD-SCNC: 5.5 MMOL/L (ref 0.7–2.1)
LACTATE BLD-SCNC: 7.2 MMOL/L (ref 0.7–2.1)
LACTATE BLD-SCNC: 8.1 MMOL/L (ref 0.7–2.1)
LACTATE BLD-SCNC: 8.2 MMOL/L (ref 0.7–2.1)
LACTATE BLD-SCNC: 8.6 MMOL/L (ref 0.7–2)
LYMPHOCYTES # BLD AUTO: 1.7 10E9/L (ref 0.8–5.3)
LYMPHOCYTES NFR BLD AUTO: 7.4 %
MCH RBC QN AUTO: 30.4 PG (ref 26.5–33)
MCHC RBC AUTO-ENTMCNC: 32.2 G/DL (ref 31.5–36.5)
MCV RBC AUTO: 95 FL (ref 78–100)
MONOCYTES # BLD AUTO: 1.2 10E9/L (ref 0–1.3)
MONOCYTES NFR BLD AUTO: 5.2 %
NEUTROPHILS # BLD AUTO: 19.5 10E9/L (ref 1.6–8.3)
NEUTROPHILS NFR BLD AUTO: 84.3 %
NRBC # BLD AUTO: 0 10*3/UL
NRBC BLD AUTO-RTO: 0 /100
PCO2 BLD: 37 MM HG (ref 35–45)
PCO2 BLD: 37 MM HG (ref 35–45)
PCO2 BLD: 42 MM HG (ref 35–45)
PCO2 BLD: 42 MM HG (ref 35–45)
PCO2 BLD: 83 MM HG (ref 35–45)
PCO2 BLD: 84 MM HG (ref 35–45)
PCO2 BLDV: 52 MM HG (ref 40–50)
PH BLD: 7.01 PH (ref 7.35–7.45)
PH BLD: 7.02 PH (ref 7.35–7.45)
PH BLD: 7.18 PH (ref 7.35–7.45)
PH BLD: 7.2 PH (ref 7.35–7.45)
PH BLD: 7.21 PH (ref 7.35–7.45)
PH BLD: 7.22 PH (ref 7.35–7.45)
PH BLDV: 7.17 PH (ref 7.32–7.43)
PLATELET # BLD AUTO: 263 10E9/L (ref 150–450)
PO2 BLD: 119 MM HG (ref 80–105)
PO2 BLD: 131 MM HG (ref 80–105)
PO2 BLD: 193 MM HG (ref 80–105)
PO2 BLD: 199 MM HG (ref 80–105)
PO2 BLD: 227 MM HG (ref 80–105)
PO2 BLD: 234 MM HG (ref 80–105)
PO2 BLDV: 38 MM HG (ref 25–47)
POTASSIUM BLD-SCNC: 3.6 MMOL/L (ref 3.4–5.3)
POTASSIUM BLD-SCNC: 3.9 MMOL/L (ref 3.4–5.3)
POTASSIUM BLD-SCNC: 4.1 MMOL/L (ref 3.4–5.3)
POTASSIUM SERPL-SCNC: 4.6 MMOL/L (ref 3.4–5.3)
PROT SERPL-MCNC: 7.6 G/DL (ref 6.8–8.8)
RADIOLOGIST FLAGS: ABNORMAL
RBC # BLD AUTO: 5.49 10E12/L (ref 3.8–5.2)
SAO2 % BLDA FROM PO2: 100 % (ref 92–100)
SAO2 % BLDA FROM PO2: 100 % (ref 92–100)
SAO2 % BLDA FROM PO2: 98 % (ref 92–100)
SAO2 % BLDA FROM PO2: 98 % (ref 92–100)
SAO2 % BLDA FROM PO2: 99 % (ref 92–100)
SAO2 % BLDA FROM PO2: 99 % (ref 92–100)
SAO2 % BLDV FROM PO2: 57 %
SODIUM BLD-SCNC: 141 MMOL/L (ref 133–144)
SODIUM BLD-SCNC: 142 MMOL/L (ref 133–144)
SODIUM BLD-SCNC: 142 MMOL/L (ref 133–144)
SODIUM SERPL-SCNC: 137 MMOL/L (ref 133–144)
TROPONIN I SERPL-MCNC: 0.67 UG/L (ref 0–0.04)
WBC # BLD AUTO: 23.1 10E9/L (ref 4–11)

## 2019-09-09 PROCEDURE — 37184 PRIM ART M-THRMBC 1ST VSL: CPT | Mod: 50

## 2019-09-09 PROCEDURE — C1894 INTRO/SHEATH, NON-LASER: HCPCS | Performed by: INTERNAL MEDICINE

## 2019-09-09 PROCEDURE — 83605 ASSAY OF LACTIC ACID: CPT | Performed by: EMERGENCY MEDICINE

## 2019-09-09 PROCEDURE — 82803 BLOOD GASES ANY COMBINATION: CPT

## 2019-09-09 PROCEDURE — 84132 ASSAY OF SERUM POTASSIUM: CPT

## 2019-09-09 PROCEDURE — 36556 INSERT NON-TUNNEL CV CATH: CPT | Performed by: INTERNAL MEDICINE

## 2019-09-09 PROCEDURE — 99291 CRITICAL CARE FIRST HOUR: CPT | Performed by: INTERNAL MEDICINE

## 2019-09-09 PROCEDURE — 40000264 ECHOCARDIOGRAM LIMITED

## 2019-09-09 PROCEDURE — 27210338 ZZH CIRCUIT HUMID FACE/TRACH MSK

## 2019-09-09 PROCEDURE — 36620 INSERTION CATHETER ARTERY: CPT | Performed by: INTERNAL MEDICINE

## 2019-09-09 PROCEDURE — 40000275 ZZH STATISTIC RCP TIME EA 10 MIN

## 2019-09-09 PROCEDURE — C1769 GUIDE WIRE: HCPCS | Performed by: INTERNAL MEDICINE

## 2019-09-09 PROCEDURE — 94660 CPAP INITIATION&MGMT: CPT

## 2019-09-09 PROCEDURE — 25000128 H RX IP 250 OP 636: Performed by: INTERNAL MEDICINE

## 2019-09-09 PROCEDURE — 25000128 H RX IP 250 OP 636: Performed by: EMERGENCY MEDICINE

## 2019-09-09 PROCEDURE — 33947 ECMO/ECLS INITIATION ARTERY: CPT

## 2019-09-09 PROCEDURE — 96360 HYDRATION IV INFUSION INIT: CPT

## 2019-09-09 PROCEDURE — 36014 PLACE CATHETER IN ARTERY: CPT | Mod: 50

## 2019-09-09 PROCEDURE — 83605 ASSAY OF LACTIC ACID: CPT

## 2019-09-09 PROCEDURE — 33952 ECMO/ECLS INSJ PRPH CANNULA: CPT | Performed by: INTERNAL MEDICINE

## 2019-09-09 PROCEDURE — 96361 HYDRATE IV INFUSION ADD-ON: CPT

## 2019-09-09 PROCEDURE — 40000014 ZZH STATISTIC ARTERIAL MONITORING DAILY

## 2019-09-09 PROCEDURE — 85610 PROTHROMBIN TIME: CPT | Performed by: EMERGENCY MEDICINE

## 2019-09-09 PROCEDURE — 93325 DOPPLER ECHO COLOR FLOW MAPG: CPT | Mod: 26 | Performed by: INTERNAL MEDICINE

## 2019-09-09 PROCEDURE — 96366 THER/PROPH/DIAG IV INF ADDON: CPT

## 2019-09-09 PROCEDURE — 25800030 ZZH RX IP 258 OP 636: Performed by: INTERNAL MEDICINE

## 2019-09-09 PROCEDURE — 80053 COMPREHEN METABOLIC PANEL: CPT | Performed by: EMERGENCY MEDICINE

## 2019-09-09 PROCEDURE — 25000125 ZZHC RX 250

## 2019-09-09 PROCEDURE — 96375 TX/PRO/DX INJ NEW DRUG ADDON: CPT

## 2019-09-09 PROCEDURE — 3E043XZ INTRODUCTION OF VASOPRESSOR INTO CENTRAL VEIN, PERCUTANEOUS APPROACH: ICD-10-PCS | Performed by: INTERNAL MEDICINE

## 2019-09-09 PROCEDURE — 82947 ASSAY GLUCOSE BLOOD QUANT: CPT

## 2019-09-09 PROCEDURE — 85347 COAGULATION TIME ACTIVATED: CPT

## 2019-09-09 PROCEDURE — 40000008 ZZH STATISTIC AIRWAY CARE

## 2019-09-09 PROCEDURE — 85025 COMPLETE CBC W/AUTO DIFF WBC: CPT | Performed by: EMERGENCY MEDICINE

## 2019-09-09 PROCEDURE — 71275 CT ANGIOGRAPHY CHEST: CPT

## 2019-09-09 PROCEDURE — 27210794 ZZH OR GENERAL SUPPLY STERILE: Performed by: INTERNAL MEDICINE

## 2019-09-09 PROCEDURE — 85379 FIBRIN DEGRADATION QUANT: CPT | Performed by: EMERGENCY MEDICINE

## 2019-09-09 PROCEDURE — 99153 MOD SED SAME PHYS/QHP EA: CPT | Performed by: INTERNAL MEDICINE

## 2019-09-09 PROCEDURE — 99291 CRITICAL CARE FIRST HOUR: CPT | Mod: AI | Performed by: INTERNAL MEDICINE

## 2019-09-09 PROCEDURE — 40000986 XR CHEST PORT 1 VW

## 2019-09-09 PROCEDURE — 25000128 H RX IP 250 OP 636

## 2019-09-09 PROCEDURE — 25500064 ZZH RX 255 OP 636: Performed by: EMERGENCY MEDICINE

## 2019-09-09 PROCEDURE — 5A1522G EXTRACORPOREAL OXYGENATION, MEMBRANE, PERIPHERAL VENO-ARTERIAL: ICD-10-PCS | Performed by: INTERNAL MEDICINE

## 2019-09-09 PROCEDURE — 27211184 ZZH CARDIOHELP CIRCUIT

## 2019-09-09 PROCEDURE — 40000256 ZZH STATISTIC CARDIOPULM RESUSCITATION

## 2019-09-09 PROCEDURE — 33947 ECMO/ECLS INITIATION ARTERY: CPT | Performed by: INTERNAL MEDICINE

## 2019-09-09 PROCEDURE — 93005 ELECTROCARDIOGRAM TRACING: CPT | Mod: 76

## 2019-09-09 PROCEDURE — 25000125 ZZHC RX 250: Performed by: EMERGENCY MEDICINE

## 2019-09-09 PROCEDURE — 82330 ASSAY OF CALCIUM: CPT

## 2019-09-09 PROCEDURE — 99285 EMERGENCY DEPT VISIT HI MDM: CPT | Mod: 25

## 2019-09-09 PROCEDURE — 99152 MOD SED SAME PHYS/QHP 5/>YRS: CPT | Performed by: INTERNAL MEDICINE

## 2019-09-09 PROCEDURE — 41000018 ZZH PER-PERFUSION 1ST 30 MIN: Performed by: INTERNAL MEDICINE

## 2019-09-09 PROCEDURE — 93005 ELECTROCARDIOGRAM TRACING: CPT

## 2019-09-09 PROCEDURE — 27211119 ZZH ARTERIAL CANNULA 15-17 FRENCH: Performed by: INTERNAL MEDICINE

## 2019-09-09 PROCEDURE — 25800030 ZZH RX IP 258 OP 636: Performed by: EMERGENCY MEDICINE

## 2019-09-09 PROCEDURE — 75743 ARTERY X-RAYS LUNGS: CPT

## 2019-09-09 PROCEDURE — C1757 CATH, THROMBECTOMY/EMBOLECT: HCPCS

## 2019-09-09 PROCEDURE — 37211 THROMBOLYTIC ART THERAPY: CPT | Mod: 50

## 2019-09-09 PROCEDURE — 84295 ASSAY OF SERUM SODIUM: CPT

## 2019-09-09 PROCEDURE — 93308 TTE F-UP OR LMTD: CPT | Mod: 26 | Performed by: INTERNAL MEDICINE

## 2019-09-09 PROCEDURE — 99292 CRITICAL CARE ADDL 30 MIN: CPT | Mod: AI | Performed by: INTERNAL MEDICINE

## 2019-09-09 PROCEDURE — 31500 INSERT EMERGENCY AIRWAY: CPT

## 2019-09-09 PROCEDURE — 00000146 ZZHCL STATISTIC GLUCOSE BY METER IP

## 2019-09-09 PROCEDURE — 94002 VENT MGMT INPAT INIT DAY: CPT

## 2019-09-09 PROCEDURE — 94640 AIRWAY INHALATION TREATMENT: CPT

## 2019-09-09 PROCEDURE — 84484 ASSAY OF TROPONIN QUANT: CPT | Performed by: EMERGENCY MEDICINE

## 2019-09-09 PROCEDURE — 71045 X-RAY EXAM CHEST 1 VIEW: CPT

## 2019-09-09 PROCEDURE — 93321 DOPPLER ECHO F-UP/LMTD STD: CPT | Mod: 26 | Performed by: INTERNAL MEDICINE

## 2019-09-09 PROCEDURE — 85014 HEMATOCRIT: CPT

## 2019-09-09 PROCEDURE — 92950 HEART/LUNG RESUSCITATION CPR: CPT

## 2019-09-09 PROCEDURE — 5A1955Z RESPIRATORY VENTILATION, GREATER THAN 96 CONSECUTIVE HOURS: ICD-10-PCS | Performed by: INTERNAL MEDICINE

## 2019-09-09 PROCEDURE — 87040 BLOOD CULTURE FOR BACTERIA: CPT | Performed by: EMERGENCY MEDICINE

## 2019-09-09 PROCEDURE — 20000004 ZZH R&B ICU UMMC

## 2019-09-09 PROCEDURE — 96365 THER/PROPH/DIAG IV INF INIT: CPT | Mod: 59

## 2019-09-09 RX ORDER — IPRATROPIUM BROMIDE AND ALBUTEROL SULFATE 2.5; .5 MG/3ML; MG/3ML
SOLUTION RESPIRATORY (INHALATION)
Status: COMPLETED
Start: 2019-09-09 | End: 2019-09-09

## 2019-09-09 RX ORDER — PIPERACILLIN SODIUM, TAZOBACTAM SODIUM 3; .375 G/15ML; G/15ML
3.38 INJECTION, POWDER, LYOPHILIZED, FOR SOLUTION INTRAVENOUS EVERY 6 HOURS
Status: COMPLETED | OUTPATIENT
Start: 2019-09-10 | End: 2019-09-14

## 2019-09-09 RX ORDER — PHENYLEPHRINE HCL IN 0.9% NACL 50MG/250ML
.5-6 PLASTIC BAG, INJECTION (ML) INTRAVENOUS CONTINUOUS
Status: DISCONTINUED | OUTPATIENT
Start: 2019-09-09 | End: 2019-09-09 | Stop reason: HOSPADM

## 2019-09-09 RX ORDER — POTASSIUM CHLORIDE 29.8 MG/ML
20 INJECTION INTRAVENOUS
Status: CANCELLED | OUTPATIENT
Start: 2019-09-09

## 2019-09-09 RX ORDER — PHENYLEPHRINE HCL IN 0.9% NACL 50MG/250ML
.5-6 PLASTIC BAG, INJECTION (ML) INTRAVENOUS CONTINUOUS
Status: CANCELLED | OUTPATIENT
Start: 2019-09-09

## 2019-09-09 RX ORDER — HEPARIN SODIUM 10000 [USP'U]/100ML
100-1000 INJECTION, SOLUTION INTRAVENOUS CONTINUOUS PRN
Status: DISCONTINUED | OUTPATIENT
Start: 2019-09-09 | End: 2019-09-09 | Stop reason: HOSPADM

## 2019-09-09 RX ORDER — LIDOCAINE 40 MG/G
CREAM TOPICAL
Status: DISCONTINUED | OUTPATIENT
Start: 2019-09-09 | End: 2019-10-07 | Stop reason: HOSPADM

## 2019-09-09 RX ORDER — CALCIUM CHLORIDE 100 MG/ML
1 INJECTION INTRAVENOUS; INTRAVENTRICULAR EVERY 10 MIN PRN
Status: DISCONTINUED | OUTPATIENT
Start: 2019-09-09 | End: 2019-10-03

## 2019-09-09 RX ORDER — NOREPINEPHRINE BITARTRATE 0.06 MG/ML
.03-.4 INJECTION, SOLUTION INTRAVENOUS CONTINUOUS
Status: DISCONTINUED | OUTPATIENT
Start: 2019-09-09 | End: 2019-09-09 | Stop reason: HOSPADM

## 2019-09-09 RX ORDER — MAGNESIUM SULFATE HEPTAHYDRATE 40 MG/ML
4 INJECTION, SOLUTION INTRAVENOUS EVERY 4 HOURS PRN
Status: DISCONTINUED | OUTPATIENT
Start: 2019-09-09 | End: 2019-09-10

## 2019-09-09 RX ORDER — NOREPINEPHRINE BITARTRATE 0.06 MG/ML
.03-.4 INJECTION, SOLUTION INTRAVENOUS CONTINUOUS
Status: CANCELLED | OUTPATIENT
Start: 2019-09-09

## 2019-09-09 RX ORDER — FENTANYL CITRATE 50 UG/ML
50 INJECTION, SOLUTION INTRAMUSCULAR; INTRAVENOUS EVERY 30 MIN PRN
Status: DISCONTINUED | OUTPATIENT
Start: 2019-09-09 | End: 2019-09-09 | Stop reason: HOSPADM

## 2019-09-09 RX ORDER — NALOXONE HYDROCHLORIDE 0.4 MG/ML
.1-.4 INJECTION, SOLUTION INTRAMUSCULAR; INTRAVENOUS; SUBCUTANEOUS
Status: DISCONTINUED | OUTPATIENT
Start: 2019-09-09 | End: 2019-10-07 | Stop reason: HOSPADM

## 2019-09-09 RX ORDER — MAGNESIUM SULFATE HEPTAHYDRATE 40 MG/ML
4 INJECTION, SOLUTION INTRAVENOUS EVERY 4 HOURS PRN
Status: CANCELLED | OUTPATIENT
Start: 2019-09-09

## 2019-09-09 RX ORDER — MIDAZOLAM (PF) 1 MG/ML IN 0.9 % SODIUM CHLORIDE INTRAVENOUS SOLUTION
1-8 CONTINUOUS
Status: CANCELLED | OUTPATIENT
Start: 2019-09-09

## 2019-09-09 RX ORDER — MIDAZOLAM (PF) 1 MG/ML IN 0.9 % SODIUM CHLORIDE INTRAVENOUS SOLUTION
1-8 CONTINUOUS
Status: DISCONTINUED | OUTPATIENT
Start: 2019-09-09 | End: 2019-09-11

## 2019-09-09 RX ORDER — PIPERACILLIN SODIUM, TAZOBACTAM SODIUM 4; .5 G/20ML; G/20ML
4.5 INJECTION, POWDER, LYOPHILIZED, FOR SOLUTION INTRAVENOUS ONCE
Status: DISCONTINUED | OUTPATIENT
Start: 2019-09-09 | End: 2019-09-09 | Stop reason: HOSPADM

## 2019-09-09 RX ORDER — PIPERACILLIN SODIUM, TAZOBACTAM SODIUM 3; .375 G/15ML; G/15ML
3.38 INJECTION, POWDER, LYOPHILIZED, FOR SOLUTION INTRAVENOUS EVERY 6 HOURS
Status: CANCELLED | OUTPATIENT
Start: 2019-09-09 | End: 2019-09-14

## 2019-09-09 RX ORDER — PHENYLEPHRINE HCL IN 0.9% NACL 50MG/250ML
.5-6 PLASTIC BAG, INJECTION (ML) INTRAVENOUS CONTINUOUS
Status: DISCONTINUED | OUTPATIENT
Start: 2019-09-09 | End: 2019-09-12 | Stop reason: CLARIF

## 2019-09-09 RX ORDER — ALBUTEROL SULFATE 0.83 MG/ML
2.5 SOLUTION RESPIRATORY (INHALATION) EVERY 4 HOURS
Status: DISCONTINUED | OUTPATIENT
Start: 2019-09-09 | End: 2019-09-12

## 2019-09-09 RX ORDER — NOREPINEPHRINE BITARTRATE 0.06 MG/ML
.03-.4 INJECTION, SOLUTION INTRAVENOUS CONTINUOUS PRN
Status: DISCONTINUED | OUTPATIENT
Start: 2019-09-09 | End: 2019-09-09 | Stop reason: HOSPADM

## 2019-09-09 RX ORDER — PROPOFOL 10 MG/ML
5-75 INJECTION, EMULSION INTRAVENOUS CONTINUOUS
Status: DISCONTINUED | OUTPATIENT
Start: 2019-09-09 | End: 2019-09-09 | Stop reason: HOSPADM

## 2019-09-09 RX ORDER — ALBUTEROL SULFATE 0.83 MG/ML
2.5 SOLUTION RESPIRATORY (INHALATION) EVERY 4 HOURS
Status: DISCONTINUED | OUTPATIENT
Start: 2019-09-09 | End: 2019-09-09 | Stop reason: HOSPADM

## 2019-09-09 RX ORDER — HEPARIN SODIUM (PORCINE) LOCK FLUSH IV SOLN 100 UNIT/ML 100 UNIT/ML
5-10 SOLUTION INTRAVENOUS EVERY 30 MIN PRN
Status: DISCONTINUED | OUTPATIENT
Start: 2019-09-09 | End: 2019-09-09 | Stop reason: HOSPADM

## 2019-09-09 RX ORDER — CALCIUM CHLORIDE 100 MG/ML
10 INJECTION INTRAVENOUS; INTRAVENTRICULAR ONCE
Status: CANCELLED | OUTPATIENT
Start: 2019-09-09 | End: 2019-09-09

## 2019-09-09 RX ORDER — LIDOCAINE 40 MG/G
CREAM TOPICAL
Status: CANCELLED | OUTPATIENT
Start: 2019-09-09

## 2019-09-09 RX ORDER — CALCIUM CHLORIDE 100 MG/ML
10 INJECTION INTRAVENOUS; INTRAVENTRICULAR ONCE
Status: DISCONTINUED | OUTPATIENT
Start: 2019-09-09 | End: 2019-09-09 | Stop reason: HOSPADM

## 2019-09-09 RX ORDER — MAGNESIUM SULFATE HEPTAHYDRATE 40 MG/ML
2 INJECTION, SOLUTION INTRAVENOUS DAILY PRN
Status: DISCONTINUED | OUTPATIENT
Start: 2019-09-09 | End: 2019-09-09 | Stop reason: HOSPADM

## 2019-09-09 RX ORDER — ARGATROBAN 1 MG/ML
350 INJECTION, SOLUTION INTRAVENOUS
Status: DISCONTINUED | OUTPATIENT
Start: 2019-09-09 | End: 2019-09-09 | Stop reason: HOSPADM

## 2019-09-09 RX ORDER — ARGATROBAN 1 MG/ML
150 INJECTION, SOLUTION INTRAVENOUS
Status: DISCONTINUED | OUTPATIENT
Start: 2019-09-09 | End: 2019-09-09 | Stop reason: HOSPADM

## 2019-09-09 RX ORDER — FENTANYL CITRATE 50 UG/ML
50 INJECTION, SOLUTION INTRAMUSCULAR; INTRAVENOUS EVERY 30 MIN PRN
Status: DISCONTINUED | OUTPATIENT
Start: 2019-09-09 | End: 2019-09-30

## 2019-09-09 RX ORDER — NOREPINEPHRINE BITARTRATE 0.06 MG/ML
.03-.4 INJECTION, SOLUTION INTRAVENOUS CONTINUOUS PRN
Status: CANCELLED | OUTPATIENT
Start: 2019-09-09

## 2019-09-09 RX ORDER — ALBUTEROL SULFATE 0.83 MG/ML
2.5 SOLUTION RESPIRATORY (INHALATION) EVERY 4 HOURS
Status: CANCELLED | OUTPATIENT
Start: 2019-09-09

## 2019-09-09 RX ORDER — NALOXONE HYDROCHLORIDE 0.4 MG/ML
.1-.4 INJECTION, SOLUTION INTRAMUSCULAR; INTRAVENOUS; SUBCUTANEOUS
Status: CANCELLED | OUTPATIENT
Start: 2019-09-09

## 2019-09-09 RX ORDER — HEPARIN SODIUM 10000 [USP'U]/100ML
500 INJECTION, SOLUTION INTRAVENOUS CONTINUOUS
Status: DISCONTINUED | OUTPATIENT
Start: 2019-09-10 | End: 2019-09-11

## 2019-09-09 RX ORDER — METHYLPREDNISOLONE SODIUM SUCCINATE 125 MG/2ML
125 INJECTION, POWDER, LYOPHILIZED, FOR SOLUTION INTRAMUSCULAR; INTRAVENOUS ONCE
Status: COMPLETED | OUTPATIENT
Start: 2019-09-09 | End: 2019-09-09

## 2019-09-09 RX ORDER — NOREPINEPHRINE BITARTRATE 0.06 MG/ML
.03-.4 INJECTION, SOLUTION INTRAVENOUS CONTINUOUS
Status: DISCONTINUED | OUTPATIENT
Start: 2019-09-09 | End: 2019-09-14

## 2019-09-09 RX ORDER — POTASSIUM CHLORIDE 29.8 MG/ML
20 INJECTION INTRAVENOUS
Status: DISCONTINUED | OUTPATIENT
Start: 2019-09-09 | End: 2019-09-09 | Stop reason: HOSPADM

## 2019-09-09 RX ORDER — NALOXONE HYDROCHLORIDE 0.4 MG/ML
.1-.4 INJECTION, SOLUTION INTRAMUSCULAR; INTRAVENOUS; SUBCUTANEOUS
Status: DISCONTINUED | OUTPATIENT
Start: 2019-09-09 | End: 2019-09-09 | Stop reason: HOSPADM

## 2019-09-09 RX ORDER — HEPARIN SODIUM (PORCINE) LOCK FLUSH IV SOLN 100 UNIT/ML 100 UNIT/ML
5-10 SOLUTION INTRAVENOUS EVERY 30 MIN PRN
Status: CANCELLED | OUTPATIENT
Start: 2019-09-09

## 2019-09-09 RX ORDER — CALCIUM CHLORIDE 100 MG/ML
1 INJECTION INTRAVENOUS; INTRAVENTRICULAR EVERY 10 MIN PRN
Status: CANCELLED | OUTPATIENT
Start: 2019-09-09

## 2019-09-09 RX ORDER — HEPARIN SODIUM 1000 [USP'U]/ML
INJECTION, SOLUTION INTRAVENOUS; SUBCUTANEOUS
Status: DISCONTINUED | OUTPATIENT
Start: 2019-09-09 | End: 2019-09-09 | Stop reason: HOSPADM

## 2019-09-09 RX ORDER — IOPAMIDOL 755 MG/ML
83 INJECTION, SOLUTION INTRAVASCULAR ONCE
Status: COMPLETED | OUTPATIENT
Start: 2019-09-09 | End: 2019-09-09

## 2019-09-09 RX ORDER — MAGNESIUM SULFATE HEPTAHYDRATE 40 MG/ML
4 INJECTION, SOLUTION INTRAVENOUS EVERY 4 HOURS PRN
Status: DISCONTINUED | OUTPATIENT
Start: 2019-09-09 | End: 2019-09-09 | Stop reason: HOSPADM

## 2019-09-09 RX ORDER — ALBUTEROL SULFATE 0.83 MG/ML
SOLUTION RESPIRATORY (INHALATION)
Status: COMPLETED
Start: 2019-09-09 | End: 2019-09-09

## 2019-09-09 RX ORDER — POTASSIUM CHLORIDE 29.8 MG/ML
20 INJECTION INTRAVENOUS
Status: DISCONTINUED | OUTPATIENT
Start: 2019-09-09 | End: 2019-09-17

## 2019-09-09 RX ORDER — LIDOCAINE 40 MG/G
CREAM TOPICAL
Status: DISCONTINUED | OUTPATIENT
Start: 2019-09-09 | End: 2019-09-09 | Stop reason: HOSPADM

## 2019-09-09 RX ORDER — NALOXONE HYDROCHLORIDE 0.4 MG/ML
.1-.4 INJECTION, SOLUTION INTRAMUSCULAR; INTRAVENOUS; SUBCUTANEOUS
Status: DISCONTINUED | OUTPATIENT
Start: 2019-09-09 | End: 2019-09-10

## 2019-09-09 RX ORDER — FENTANYL CITRATE 50 UG/ML
50 INJECTION, SOLUTION INTRAMUSCULAR; INTRAVENOUS EVERY 30 MIN PRN
Status: CANCELLED | OUTPATIENT
Start: 2019-09-09

## 2019-09-09 RX ORDER — IODIXANOL 320 MG/ML
150 INJECTION, SOLUTION INTRAVASCULAR ONCE
Status: COMPLETED | OUTPATIENT
Start: 2019-09-09 | End: 2019-09-10

## 2019-09-09 RX ORDER — PROPOFOL 10 MG/ML
5-75 INJECTION, EMULSION INTRAVENOUS CONTINUOUS
Status: DISCONTINUED | OUTPATIENT
Start: 2019-09-09 | End: 2019-09-14

## 2019-09-09 RX ORDER — NITROGLYCERIN 5 MG/ML
VIAL (ML) INTRAVENOUS
Status: DISCONTINUED | OUTPATIENT
Start: 2019-09-09 | End: 2019-09-09 | Stop reason: HOSPADM

## 2019-09-09 RX ORDER — HEPARIN SODIUM (PORCINE) LOCK FLUSH IV SOLN 100 UNIT/ML 100 UNIT/ML
5-10 SOLUTION INTRAVENOUS EVERY 30 MIN PRN
Status: DISCONTINUED | OUTPATIENT
Start: 2019-09-09 | End: 2019-09-10

## 2019-09-09 RX ORDER — LIDOCAINE HYDROCHLORIDE ANHYDROUS AND DEXTROSE MONOHYDRATE .8; 5 G/100ML; G/100ML
1-4 INJECTION, SOLUTION INTRAVENOUS CONTINUOUS
Status: DISCONTINUED | OUTPATIENT
Start: 2019-09-09 | End: 2019-09-09 | Stop reason: HOSPADM

## 2019-09-09 RX ORDER — PROPOFOL 10 MG/ML
INJECTION, EMULSION INTRAVENOUS
Status: COMPLETED
Start: 2019-09-09 | End: 2019-09-09

## 2019-09-09 RX ORDER — PIPERACILLIN SODIUM, TAZOBACTAM SODIUM 3; .375 G/15ML; G/15ML
3.38 INJECTION, POWDER, LYOPHILIZED, FOR SOLUTION INTRAVENOUS EVERY 6 HOURS
Status: DISCONTINUED | OUTPATIENT
Start: 2019-09-09 | End: 2019-09-09 | Stop reason: HOSPADM

## 2019-09-09 RX ORDER — PROPOFOL 10 MG/ML
5-75 INJECTION, EMULSION INTRAVENOUS CONTINUOUS
Status: CANCELLED | OUTPATIENT
Start: 2019-09-09

## 2019-09-09 RX ORDER — CALCIUM CHLORIDE 100 MG/ML
1 INJECTION INTRAVENOUS; INTRAVENTRICULAR EVERY 10 MIN PRN
Status: DISCONTINUED | OUTPATIENT
Start: 2019-09-09 | End: 2019-09-09 | Stop reason: HOSPADM

## 2019-09-09 RX ORDER — LEVALBUTEROL 1.25 MG/.5ML
2.5 SOLUTION, CONCENTRATE RESPIRATORY (INHALATION) EVERY 6 HOURS PRN
Status: DISCONTINUED | OUTPATIENT
Start: 2019-09-09 | End: 2019-09-09 | Stop reason: HOSPADM

## 2019-09-09 RX ADMIN — LEVALBUTEROL HYDROCHLORIDE 2.5 MG: 1.25 SOLUTION, CONCENTRATE RESPIRATORY (INHALATION) at 18:02

## 2019-09-09 RX ADMIN — IPRATROPIUM BROMIDE AND ALBUTEROL SULFATE: .5; 3 SOLUTION RESPIRATORY (INHALATION) at 14:45

## 2019-09-09 RX ADMIN — SODIUM CHLORIDE 2000 ML: 9 INJECTION, SOLUTION INTRAVENOUS at 18:17

## 2019-09-09 RX ADMIN — EPINEPHRINE 0.03 MCG/KG/MIN: 1 INJECTION INTRAMUSCULAR; INTRAVENOUS; SUBCUTANEOUS at 17:37

## 2019-09-09 RX ADMIN — PROPOFOL 10 MCG/KG/MIN: 10 INJECTION, EMULSION INTRAVENOUS at 18:16

## 2019-09-09 RX ADMIN — PROPOFOL 20 MCG/KG/MIN: 10 INJECTION, EMULSION INTRAVENOUS at 21:16

## 2019-09-09 RX ADMIN — SODIUM CHLORIDE 100 ML: 9 INJECTION, SOLUTION INTRAVENOUS at 18:50

## 2019-09-09 RX ADMIN — HUMAN ALBUMIN MICROSPHERES AND PERFLUTREN 5 ML: 10; .22 INJECTION, SOLUTION INTRAVENOUS at 19:09

## 2019-09-09 RX ADMIN — EPINEPHRINE 0.15 MCG/KG/MIN: 1 INJECTION PARENTERAL at 23:37

## 2019-09-09 RX ADMIN — METHYLPREDNISOLONE SODIUM SUCCINATE 125 MG: 125 INJECTION, POWDER, FOR SOLUTION INTRAMUSCULAR; INTRAVENOUS at 18:54

## 2019-09-09 RX ADMIN — HEPARIN SODIUM 1800 UNITS/HR: 10000 INJECTION, SOLUTION INTRAVENOUS at 19:34

## 2019-09-09 RX ADMIN — IPRATROPIUM BROMIDE AND ALBUTEROL SULFATE: .5; 3 SOLUTION RESPIRATORY (INHALATION) at 17:00

## 2019-09-09 RX ADMIN — ALTEPLASE 50 MG: KIT at 17:33

## 2019-09-09 RX ADMIN — IOPAMIDOL 83 ML: 755 INJECTION, SOLUTION INTRAVENOUS at 18:50

## 2019-09-09 RX ADMIN — ALBUTEROL SULFATE: 2.5 SOLUTION RESPIRATORY (INHALATION) at 16:50

## 2019-09-09 RX ADMIN — EPINEPHRINE 0.15 MCG/KG/MIN: 1 INJECTION INTRAMUSCULAR; INTRAVENOUS; SUBCUTANEOUS at 20:44

## 2019-09-09 ASSESSMENT — ENCOUNTER SYMPTOMS
DIAPHORESIS: 1
SHORTNESS OF BREATH: 1
APPETITE CHANGE: 1
COUGH: 1

## 2019-09-09 NOTE — Clinical Note
Potential access sites were evaluated for patency using ultrasound.   The left femoral artery was selected. Access was obtained under with Sonosite guidance using a standard 18 guage needle with direct visualization of needle entry.      Skin normal color for race, warm, dry and intact. No evidence of rash.

## 2019-09-09 NOTE — Clinical Note
Potential access sites were evaluated for patency using ultrasound.   The left femoral vein was selected. Access was obtained under with Sonosite guidance using a standard 18 guage needle with direct visualization of needle entry.

## 2019-09-09 NOTE — ED NOTES
DATE:  9/9/2019   TIME OF RECEIPT FROM LAB:  1828  LAB TEST:  Troponin  LAB VALUE:  0.674  RESULTS GIVEN WITH READ-BACK TO (PROVIDER):  Sarah Gibson MD  TIME LAB VALUE REPORTED TO PROVIDER:   1829

## 2019-09-09 NOTE — Clinical Note
Suturing in both cannulas and placing a IO band over them.  Suturing in the RFV and placing caps and a tagaderm.  Reason for placing this access was for interventional radiology.  Suturing in Arterial line in the right radial.

## 2019-09-09 NOTE — LETTER
Transition Communication Hand-off for Care Transitions to Next Level of Care Provider    Name: Shira Rodriguez  : 1960  MRN #: [id number]  Primary Care Provider: Anjel Busby     Primary Clinic: [address] [place] [address]     Reason for Hospitalization:  ECMO  heart condition  ECMO  Pulmonary emboli (H)  Pulmonary emboli (H)  Admit Date/Time: 2019  9:30 PM  Discharge Date: 10/7/19  Payor Source: Payor: PREFERREDONE / Plan: [employer] PREFERREDONE / Product Type: PPO /     Readmission Assessment Measure (LUDIVINA) Risk Score/category: Elevated         Reason for Communication Hand-off Referral: Other Discharge to TCU    Discharge Plan:  [facility]  [phone]       Concern for non-adherence with plan of care:   Y/N n  Discharge Needs Assessment:  Needs      Most Recent Value   Equipment Currently Used at Home  none          Already enrolled in Tele-monitoring program and name of program:  Unknown  Follow-up specialty is recommended: Yes    Follow-up plan:    Future Appointments   Date Time Provider Department Center   10/11/2019  1:15 PM Vicky Pratt, PT [facility]   10/11/2019  2:30 PM Biju Little, PTA [facility]   10/11/2019  3:00 PM Ben Laureano, OTR [facility]   10/18/2019 11:00 AM Alondra Corbett, NP [facility]   10/21/2019 10:00 AM Anjel Busby MD [facility]   10/25/2019  6:30 AM  LAB [facility]   10/25/2019  7:00 AM Everett Austin MD [facility]   2019  4:00 PM Kaleb Mcfarlane MD [facility]       Any outstanding tests or procedures:    Procedures     Future Labs/Procedures    Oxygen - Nasal cannula     Comments:    2-4 Lpm by nasal cannula to keep O2 sats 88% or greater.          Referrals     Future Labs/Procedures    Occupational Therapy Adult Consult     Comments:    Evaluate and treat as clinically indicated.    Reason:  deconditioning    Physical Therapy Adult Consult     Comments:    Evaluate and treat as clinically  indicated.    Reason:  deconditioning            Key Recommendations:      ABHISHEK Christianson, ARRONW  [address]   Phone: [phone]  Pager: [phone]  Unit: [phone]

## 2019-09-09 NOTE — ED PROVIDER NOTES
History     Chief Complaint:  Respiratory Distress      HPI   Shira Rodriguez is a 59 year old female with a history of hypertension, hyperlipidemia, sleep apnea, morbid obesity, and chronic bronchitis who presents to the emergency department via EMS for evaluation of respiratory distress. Per EMS report, they were called to the patient's apartment due to worsening shortness of breath that gradually started two days ago with an accompanied cough. On arrival, she was only able to talk in 1-2 sentences at a time and appeared cyanotic and clammy. She was 87% on room air which didn't improve with oxygen so she was started on BiPAP which increased her oxygen saturation to 92%, though patient doesn't feel like it's helping her symptoms. Her blood glucose was 264 and blood pressure was 94/82. She has chronic bronchitis but does not feel that this is the issue and EMS did not appreciate any wheezing. Patient also notes appetite loss. No loss of consciousness chest pain, use of blood thinners, hx of blood clots or pneumonia. She has no hx of heart disease but did have an episode of asystole during a sleep study.    6/7/2018 Echocardiogram Complete  Interpretation Summary  Moderate concentric wall thickening consistent with left ventricular  hypertrophy is present.  Biventricular size and systolic function is normal.The LVEF is 55-60%.  No significant valve disease.  Inferior vena cava appears dilated measuring 2.4 cm    Allergies:  Erythromycin      Medications:    Flexeril  Cymbalta  Insulin  Wellcovorin   Lisinopril  Methotrexate  Medrol Dosepak  Mycostatin  Aldactone  Chantix     Past Medical History:    Chronic bronchitis  Contact dermatitis  Depression  RA  Adjustment disorder   Eczema  Hyperlipidemia  Hypertension  Morbid obesity  Sleep apnea  Osteoarthrosis   Tobacco abuse    Past Surgical History:    Left total knee arthroplasty  Right hip arthroplasty     Family History:    Thyroid disease  Hypertension  Skin  cancer  CVA  Diabetes  Breast cancer  Pancreatic cancer  Cardiovascular: paternal uncle, sister  Depression  Alcoholism  Prostate cancer    Social History:  Tobacco Use: Currently smokes 1 ppd since 1982  Alcohol Use: Occasional   PCP: Anjel Busby  Marital Status:  Single      Review of Systems   Unable to perform ROS: Acuity of condition   Constitutional: Positive for appetite change and diaphoresis.   Respiratory: Positive for cough and shortness of breath.    Cardiovascular: Negative for chest pain.   Skin: Positive for pallor.   Neurological: Negative for syncope.       Physical Exam     Patient Vitals for the past 24 hrs:   BP Temp Temp src Pulse Heart Rate Resp SpO2 Weight   09/09/19 2110 119/77 -- -- 130 131 16 96 % --   09/09/19 2100 94/78 -- -- 129 130 15 96 % --   09/09/19 2050 98/77 -- -- 130 131 14 99 % --   09/09/19 2048 -- -- -- -- -- -- 98 % --   09/09/19 2040 106/79 -- -- 133 130 15 100 % --   09/09/19 2030 116/89 -- -- 129 130 15 100 % --   09/09/19 2020 118/82 -- -- -- 130 15 99 % --   09/09/19 2015 114/64 -- -- 130 131 15 99 % (!) 178.6 kg (393 lb 11.9 oz)   09/09/19 2010 -- -- -- -- 129 18 94 % --   09/09/19 2005 112/71 -- -- 129 138 23 95 % --   09/09/19 2000 -- 96.4  F (35.8  C) Axillary -- -- -- -- --   09/09/19 1930 94/78 -- -- 123 123 14 94 % --   09/09/19 1920 121/83 -- -- 123 123 14 93 % --   09/09/19 1910 124/70 -- -- -- 123 15 93 % --   09/09/19 1855 121/86 -- -- -- 124 16 93 % --   09/09/19 1846 114/71 -- -- 122 121 (!) 43 97 % --   09/09/19 1815 (!) 150/86 -- -- 117 123 22 90 % --   09/09/19 1814 (!) 136/92 -- -- 123 -- -- -- --   09/09/19 1813 136/86 -- -- 123 123 20 92 % --   09/09/19 1811 (!) 140/99 -- -- 123 124 22 93 % --   09/09/19 1810 (!) 147/96 -- -- 124 125 28 (!) 89 % --   09/09/19 1809 (!) 143/114 -- -- 125 124 23 90 % --   09/09/19 1808 130/85 -- -- 131 131 21 90 % --   09/09/19 1807 (!) 140/110 -- -- 123 124 (!) 43 91 % --   09/09/19 1806 (!) 128/102 -- -- 124 126  21 90 % --   09/09/19 1805 (!) 121/104 -- -- 124 -- -- -- --   09/09/19 1804 (!) 128/97 -- -- 124 123 18 90 % --   09/09/19 1803 (!) 134/102 -- -- 124 117 24 90 % --   09/09/19 1802 (!) 125/97 -- -- 120 -- 21 90 % --   09/09/19 1801 (!) 134/107 -- -- 116 121 22 90 % --   09/09/19 1800 (!) 127/94 -- -- 121 -- -- -- --   09/09/19 1759 135/89 -- -- 120 120 (!) 34 92 % --   09/09/19 1758 (!) 152/111 -- -- 121 123 18 90 % --   09/09/19 1757 -- -- -- -- 125 21 93 % --   09/09/19 1756 (!) 180/128 -- -- 123 128 20 94 % --   09/09/19 1755 (!) 177/115 -- -- 126 -- -- -- --   09/09/19 1754 (!) 201/125 -- -- 126 123 (!) 131 (!) 61 % --   09/09/19 1746 107/82 -- -- -- 98 19 (!) 81 % --   09/09/19 1719 90/66 -- -- -- 51 (!) 31 (!) 71 % --   09/09/19 1700 96/78 -- -- 138 140 (!) 31 99 % --   09/09/19 1651 91/50 -- -- -- -- -- -- --   09/09/19 1644 (!) 145/128 -- -- -- -- -- -- --   09/09/19 1642 -- -- -- 146 -- 30 98 % (!) 180.3 kg (397 lb 7.8 oz)     Physical Exam  Nursing note and vitals reviewed.  Constitutional:  Appears to be in severe respiratory distress; she is tachypneic with cyanotic lips while arriving on BiPAP. Morbidly obese.  HENT:   Head:    Atraumatic.   Mouth/Throat:   Oropharynx is clear and moist. No oropharyngeal exudate.   Eyes:    Pupils are equal, round, and reactive to light.   Neck:    Normal range of motion. Neck supple.      No tracheal deviation present. No thyromegaly present.   Cardiovascular:  Normal rate, regular rhythm, no murmur   Pulmonary/Chest: Appears to be in severe respiratory distress; she is tachypneic with cyanotic lips while arriving on BiPAP. Breath sounds are clear and equal without wheezes or crackles.  Abdominal:   Soft. Bowel sounds are normal. Exhibits no distension and      no mass. There is no tenderness.      There is no rebound and no guarding.   Musculoskeletal:  Exhibits no edema.   Lymphadenopathy:  No cervical adenopathy.   Neurological:   Slightly drowsy but awake,  oriented to person, place, and time.   Skin:    Skin is warm and dry. No rash noted. Mild cyanosis to the lips and slightly mottled skin to the legs.     Emergency Department Course   ECG:  @ 1644  Indication: Respiratory distress  Vent. Rate 142 bpm. KY interval * ms. QRS duration 140 ms. QT/QTc 358/550 ms. P-R-T axis * 122 2.   Wide QRS tachycardia. Right bundle branch block. T wave abnormality, consider inferior ischemia. Abnormal ECG  When compared with ECG of 20-MAY-2019 07:50, Wide QRS tachycardia has replaced Sinus rhythm, Vent. rate has increased BY 50 BPM.   Read @ 1648 by Dr. Gibson.    ECG #2:  @ 1815  Indication: Respiratory distress  Vent. Rate 123 bpm. KY interval 136 ms. QRS duration 144 ms. QT/QTc 416/595 ms. P-R-T axis * 132 -88.   Sinus tachycardia. Right bundle branch block. T wave abnormality, consider inferior ischemia. Abnormal ECG.   Read @ 1850 by Dr. Gibson.    Imaging:  Chest X-Ray. Port 1 View:   IMPRESSION: EKG leads are seen over the thorax. There is increased  opacity in the left retrocardiac region. This could be due to pleural  fluid and associated infiltrate or atelectasis. The right lung appears  clear.  Reading per radiology.     Chest X-Ray. 2 Views:   IMPRESSION: ET tube tip in place. The exact position of the tip of the  ET tube with respect to the iain is difficult to determine on this  view. The tip lies at least 3 cm below the level of the clavicular  heads.. It could be pulled back.  Reading per radiology.     CT Chest Pulmonary Embolism with contrast:  IMPRESSION:   1. There is extensive occlusive and nonocclusive pulmonary embolism  bilaterally.  2. Prominence of the right heart suggests some degree of right heart  strain associated with the pulmonary embolism.  3. Endotracheal tube is in place, with tip 0.4 cm above the iain.  This should be pulled back approximately 2 cm for more optimal  positioning above the iain.  4. Small left pleural effusion.  5. Patchy  consolidation and infiltrate in both lungs is nonspecific,  but may be related to atelectasis.     [Critical Result: Pulmonary embolism and malpositioning of the  endotracheal tube]    Finding was identified on 9/9/2019 6:53 PM.     Dr. Gibson was contacted by me on 9/9/2019 7:02 PM and verbalized  understanding of the critical result.    Reading per radiology.    Radiographic findings were communicated with the patient who voiced understanding of the findings.    Laboratory:  CBC: WBC: 23.1 (H), HGB: 16.7 (H), PLT: 263  CMP: Glucose 258 (H), Anion gap: 15 (H), Creatinine: 1.28 (H), GFR: 46 (L), o/w WNL     INR: 1.23 (H)    1652 Troponin I: 0.674 (H)    D dimer quantitative: >20.0 (H)    1708 ISTAT gases lactate venous POCT: pH: 7.17 (L), PCO2: 52 (H), PO2: 38, Bicarb: 19 (L), O2 Sat: 57, Lactic acid: 8.1 (H)    1719 Lactic acid whole blood: 8.6 (H)    1652 Blood culture: No growth after 1 hour  1848 Blood culture: In process    [facility]    Intubation  Date/Time: 9/9/2019 5:10 PM  Performed by: Sarah Gibson MD  Authorized by: Sarah Gibson MD     ED EVALUATION:      Assessment Time: 9/9/2019 4:37 PM      I have performed an Emergency Department Evaluation including taking a history and physical examination, this evaluation will be documented in the electronic medical record for this ED encounter.      ASA Class: Class 5- Severe systemic disease with imminent risk of death    NPO Status: not NPO, emergent situation  UNIVERSAL PROTOCOL   Site Marked: NA  Prior Images Obtained and Reviewed:  Yes  Required items: Required blood products, implants, devices and special equipment available    Patient identity confirmed:  Verbally with patient  Patient was reevaluated immediately before administering moderate or deep sedation or anesthesia  Confirmation Checklist:  Patient's identity using two indicators, relevant allergies, procedure was appropriate and matched the consent or emergent  situation and correct equipment/implants were available    PRE-PROCEDURE DETAILS     Patient status:  Awake    Mallampati score:  III    Pretreatment medications:  None    Paralytics:  Succinylcholine      PROCEDURE DETAILS     Preoxygenation:  BiPAP    CPR in progress: no      Intubation method:  Oral    Oral intubation technique:  Direct and video-assisted    Laryngoscope size: glide scope 4.    Tube size (mm):  7.5    Tube type:  Cuffed    Number of attempts:  1    Cricoid pressure: no      Tube visualized through cords: yes      PLACEMENT ASSESSMENT     ETT to lip:  24 cm corner of the lip    Tube secured with:  ETT zelaya    Breath sounds:  Equal and absent over the epigastrium    Placement verification: chest rise, condensation, equal breath sounds, ETCO2 detector and tube exhalation      Placement verification comment:  CT chest showed ET tube 4 mm above iain, so ET tube was pulled back 2 cm by RT    Chest x-ray findings: ET tube 4 mm above iain, so RT was asked to pull ET back 2 cm.  PROCEDURE   Patient complications:  Cardiac arrest  Circulation Interventions: chest compressions, IV fluid bolus administered and use of vasoactive medications  Time of Sedation in Minutes by Physician:  15 (for this specific procedure)    PROCEDURE:  Limited Portable Echo Adult. (Emergent exam, abbreviated study performed).  Optison (NDC #3038-6201) given intravenously.  Interpretation Summary     The right ventricle is severely dilated.  Severely decreased right ventricular systolic function  The visual ejection fraction is estimated at 60-65%.  Left ventricular systolic function is normal.  Flattened septum is consistent with RV pressure overload.  The study was technically difficult. The study was technically limited.    Interventions:  1444 Albuterol-Ipratropium inhalation solution, 2.5 mg-0.5 mg/3 ml; 3 mL, inhalation  1650 Albuterol 2.5mg/3mL nebulization  1700 Albuterol-Ipratropium inhalation solution, 2.5 mg-0.5  mg/3 ml; 3 mL, inhalation  17:19  Epi 1 mg IV  Atropine 1 mg IV  17:25  Epi 1 mg IV  1733 Activase bolus 50 mg IV  Epi 1 mg IV  1737 Adrenalin 0.03 mcg/kg/min x 180.3 kg IV   1743 restarted   1756 dose change- 0.08 mcg/kg/min    1805 dose change - 0.15 mcg/kg/min   17:47   Epi 1 mg IV  1802 Levalbuterol 2.5 mg nebulization  1816 Diprivan 10 mcg/kg/min IV   1850 dose change- 20 mcg/kg/min  1817 0.9% Sodium Chloride BOLUS 1000 mLs IV   1854 Solu-Medrol 125 mg IV  1909 Perflutren diluted injection 5 mL IV  1934 Heparin infusion 18,000 units/hr  (patient was also given Atropine 1 mg IV and Sodium Bicarb 2 amps IV during the resuscitation)    Emergency Department Course:  1637 Nursing notes and vitals reviewed. I performed an exam of the patient as documented above.     EKG was done, interpretation as above.    IV inserted. Medicine administered as documented above. Blood drawn. This was sent to the lab for further testing, results above.    Portable chest XR done while in the emergency department, findings above.     1710  A intubation procedure was performed as outlined in the procedure note above.      1719 Patient went into cardiac arrest. CPR started. TPA ordered.    1733 TPA started, completed at 1736    1743 Pulse returned. Cardiac US performed.     1746 Pressure: 107/82    1747 Pulse lost. CPR restarted.     1750 I consulted with Dr. Rios, intensivist, regarding the patient's history and presentation here in the emergency department.    1754 Pulse returned.     1755 Pressure: 177/115    Bedside chest XR repeated, findings above.     I spoke with Dr. Garrett Interventional Cardiology    Patient sent for a chest CT, findings above.     1804 I consulted with Dr. Rios, intensivist.    1608 I consulted with Dr. Garrett, interventional cardiology, regarding the patient's history and presentation here in the emergency department. He did not feel there was indication for cardiac catheterization.     1750 I rechecked  the patient.    1844 I consulted with Dr. Akers, cardiology, regarding the patient's history and presentation here in the emergency department.    1854 I rechecked the patient.    1902 I consulted with Dr. Harding, radiology, regarding the patient's CT scan.    1903 I rechecked the patient and updated the family.     1904 I consulted with Dr. Mcguire, intensivist, regarding the patient's history and presentation here in the emergency department.    Bedside echocardiogram was performed, findings above.     1942 I rechecked the patient.    Dr. Rios will admit the patient to an ICU bed for further monitoring, evaluation, and treatment.    Impression & Plan    Medical Decision Making:  I found the patient to have extensive bilateral pulmonary embolism with resulting right heart strain and cardiac arrest. Upon her arrival to the critical care stab room, she was in extreme respiratory failure and did not respond to the BiPAP that the medics had initiated. I felt that she was too unstable with her respiratory status to withstand CT imaging for evaluation for pulmonary embolism which I highly suspected upon her arrival so she was intubated after I discussed this with her and she consented to the intubation. She was intubated quickly and easily and her heart rate did not change during the intubation.  Shortly after intubation, her heart rate began to gradually slow down and she became bradycardic in the 40's to 50's and then she went into PEA cardiac arrest. She lost her pulses and CPR was initiated and epinephrine IV and cardiac resuscitation was performed. At this point, she was also given IV TPA for massive pulmonary emboli protocol and eventually began with sustained return of spontaneous circulation and became stable enough for CT imaging which confirmed the diagnosis of massive bilateral pulmonary emboli. Cardiac stat bedside echo was also obtained which showed right heart strain consistent with pulmonary emboli. I  consulted interventional cardiology and talked to Dr. Garrett and we felt there was no emergent indication for cardiac catheterization at this time. She was maintained on an epinephrine drip and admitted to the intensive care unit under the care of the Intensivist Dr. Mcguire.    I had also considered in the initial differential the possibility of pneumonia with sepsis, COPD exacerbation, myocardial infarction, and pneumothorax.     Critical Care time:  was 90 minutes for this patient excluding procedures.    Diagnosis:    ICD-10-CM    1. Cardiac arrest (H) I46.9 D dimer quantitative     Glucose by meter     Glucose by meter   2. Bilateral pulmonary embolism (H) I26.99        Disposition:  Admitted to the ICU    Scribe Disclosure:  I, Koffi Spence, am serving as a scribe on 9/9/2019 at 4:37 PM to personally document services performed by Sarah Hicks MD based on my observations and the provider's statements to me.     Koffi Spence  9/9/2019    EMERGENCY DEPARTMENT       Sarah Gibson MD  09/09/19 [id number]       Sarah Gibson MD  09/09/19 7120

## 2019-09-09 NOTE — Clinical Note
dry, intact, no bleeding and no hematoma. All line sutured and covered see flow sheet for more info.

## 2019-09-09 NOTE — ED NOTES
Bed: ST  Expected date: 9/9/19  Expected time: 4:27 PM  Means of arrival: Ambulance  Comments:  [id number] [id number] ETA [id number]

## 2019-09-10 ENCOUNTER — APPOINTMENT (OUTPATIENT)
Dept: CARDIOLOGY | Facility: CLINIC | Age: 59
DRG: 003 | End: 2019-09-10
Attending: INTERNAL MEDICINE
Payer: COMMERCIAL

## 2019-09-10 ENCOUNTER — APPOINTMENT (OUTPATIENT)
Dept: ULTRASOUND IMAGING | Facility: CLINIC | Age: 59
DRG: 003 | End: 2019-09-10
Attending: INTERNAL MEDICINE
Payer: COMMERCIAL

## 2019-09-10 ENCOUNTER — APPOINTMENT (OUTPATIENT)
Dept: CT IMAGING | Facility: CLINIC | Age: 59
DRG: 003 | End: 2019-09-10
Attending: INTERNAL MEDICINE
Payer: COMMERCIAL

## 2019-09-10 ENCOUNTER — ALLIED HEALTH/NURSE VISIT (OUTPATIENT)
Dept: NEUROLOGY | Facility: CLINIC | Age: 59
DRG: 003 | End: 2019-09-10
Attending: PSYCHIATRY & NEUROLOGY
Payer: COMMERCIAL

## 2019-09-10 ENCOUNTER — APPOINTMENT (OUTPATIENT)
Dept: GENERAL RADIOLOGY | Facility: CLINIC | Age: 59
DRG: 003 | End: 2019-09-10
Attending: INTERNAL MEDICINE
Payer: COMMERCIAL

## 2019-09-10 ENCOUNTER — APPOINTMENT (OUTPATIENT)
Dept: GENERAL RADIOLOGY | Facility: CLINIC | Age: 59
DRG: 003 | End: 2019-09-10
Attending: STUDENT IN AN ORGANIZED HEALTH CARE EDUCATION/TRAINING PROGRAM
Payer: COMMERCIAL

## 2019-09-10 DIAGNOSIS — I46.9 CARDIAC ARREST (H): Primary | ICD-10-CM

## 2019-09-10 PROBLEM — M06.9 RHEUMATOID ARTHRITIS (H): Status: ACTIVE | Noted: 2017-08-28

## 2019-09-10 LAB
ABO + RH BLD: NORMAL
ABO + RH BLD: NORMAL
ALBUMIN SERPL-MCNC: 2.1 G/DL (ref 3.4–5)
ALBUMIN SERPL-MCNC: 2.1 G/DL (ref 3.4–5)
ALBUMIN SERPL-MCNC: 2.2 G/DL (ref 3.4–5)
ALBUMIN SERPL-MCNC: 2.2 G/DL (ref 3.4–5)
ALBUMIN SERPL-MCNC: 2.4 G/DL (ref 3.4–5)
ALBUMIN UR-MCNC: 100 MG/DL
ALP SERPL-CCNC: 55 U/L (ref 40–150)
ALP SERPL-CCNC: 59 U/L (ref 40–150)
ALP SERPL-CCNC: 64 U/L (ref 40–150)
ALP SERPL-CCNC: 73 U/L (ref 40–150)
ALP SERPL-CCNC: 80 U/L (ref 40–150)
ALT SERPL W P-5'-P-CCNC: 135 U/L (ref 0–50)
ALT SERPL W P-5'-P-CCNC: 151 U/L (ref 0–50)
ALT SERPL W P-5'-P-CCNC: 173 U/L (ref 0–50)
ALT SERPL W P-5'-P-CCNC: 194 U/L (ref 0–50)
ALT SERPL W P-5'-P-CCNC: 208 U/L (ref 0–50)
AMPHETAMINES SPEC-MCNC: NORMAL NG/ML
AMPHETAMINES UR QL SCN: NEGATIVE
ANGLE RATE OF CLOT STRENGTH: 34 DEGREES (ref 53–72)
ANION GAP SERPL CALCULATED.3IONS-SCNC: 10 MMOL/L (ref 3–14)
ANION GAP SERPL CALCULATED.3IONS-SCNC: 11 MMOL/L (ref 3–14)
ANION GAP SERPL CALCULATED.3IONS-SCNC: 11 MMOL/L (ref 3–14)
ANION GAP SERPL CALCULATED.3IONS-SCNC: 18 MMOL/L (ref 3–14)
ANION GAP SERPL CALCULATED.3IONS-SCNC: 19 MMOL/L (ref 3–14)
APAP BLD-MCNC: NORMAL UG/ML
APPEARANCE UR: ABNORMAL
APTT PPP: 171 SEC (ref 22–37)
APTT PPP: 41 SEC (ref 22–37)
APTT PPP: 52 SEC (ref 22–37)
APTT PPP: 57 SEC (ref 22–37)
APTT PPP: >240 SEC (ref 22–37)
AST SERPL W P-5'-P-CCNC: 101 U/L (ref 0–45)
AST SERPL W P-5'-P-CCNC: 148 U/L (ref 0–45)
AST SERPL W P-5'-P-CCNC: 212 U/L (ref 0–45)
AST SERPL W P-5'-P-CCNC: 346 U/L (ref 0–45)
AST SERPL W P-5'-P-CCNC: ABNORMAL U/L (ref 0–45)
AT III ACT/NOR PPP CHRO: 62 % (ref 85–135)
BACTERIA #/AREA URNS HPF: ABNORMAL /HPF
BARBITURATES SPEC-MCNC: NORMAL NG/ML
BARBITURATES UR QL: NEGATIVE
BASE DEFICIT BLDA-SCNC: 0.1 MMOL/L
BASE DEFICIT BLDA-SCNC: 0.5 MMOL/L
BASE DEFICIT BLDA-SCNC: 0.6 MMOL/L
BASE DEFICIT BLDA-SCNC: 0.6 MMOL/L
BASE DEFICIT BLDA-SCNC: 0.7 MMOL/L
BASE DEFICIT BLDA-SCNC: 1 MMOL/L
BASE DEFICIT BLDA-SCNC: 2.2 MMOL/L
BASE DEFICIT BLDA-SCNC: 4.4 MMOL/L
BASE DEFICIT BLDA-SCNC: 8.1 MMOL/L
BASE DEFICIT BLDA-SCNC: 9 MMOL/L
BASE DEFICIT BLDA-SCNC: 9.9 MMOL/L
BASE DEFICIT BLDV-SCNC: 0.2 MMOL/L
BASE DEFICIT BLDV-SCNC: 7.1 MMOL/L
BASE EXCESS BLDA CALC-SCNC: 0 MMOL/L
BASE EXCESS BLDA CALC-SCNC: 0.1 MMOL/L
BASE EXCESS BLDV CALC-SCNC: 0.1 MMOL/L
BASOPHILS # BLD AUTO: 0.1 10E9/L (ref 0–0.2)
BASOPHILS NFR BLD AUTO: 0.4 %
BENZODIAZ SPEC-MCNC: NORMAL NG/ML
BENZODIAZ UR QL: POSITIVE
BILIRUB SERPL-MCNC: 0.5 MG/DL (ref 0.2–1.3)
BILIRUB SERPL-MCNC: 0.6 MG/DL (ref 0.2–1.3)
BILIRUB SERPL-MCNC: 0.7 MG/DL (ref 0.2–1.3)
BILIRUB SERPL-MCNC: 0.9 MG/DL (ref 0.2–1.3)
BILIRUB SERPL-MCNC: 0.9 MG/DL (ref 0.2–1.3)
BILIRUB UR QL STRIP: NEGATIVE
BLD GP AB SCN SERPL QL: NORMAL
BLD PROD TYP BPU: NORMAL
BLD PROD TYP BPU: NORMAL
BLOOD BANK CMNT PATIENT-IMP: NORMAL
BUN SERPL-MCNC: 25 MG/DL (ref 7–30)
BUN SERPL-MCNC: 26 MG/DL (ref 7–30)
BUN SERPL-MCNC: 28 MG/DL (ref 7–30)
BUN SERPL-MCNC: 31 MG/DL (ref 7–30)
BUN SERPL-MCNC: 34 MG/DL (ref 7–30)
BUPRENORPHINE SERPLBLD-MCNC: NORMAL NG/ML
CA-I BLD-MCNC: 4.1 MG/DL (ref 4.4–5.2)
CA-I BLD-MCNC: 4.4 MG/DL (ref 4.4–5.2)
CA-I BLD-MCNC: 4.5 MG/DL (ref 4.4–5.2)
CA-I BLD-MCNC: 4.8 MG/DL (ref 4.4–5.2)
CA-I BLD-MCNC: 4.8 MG/DL (ref 4.4–5.2)
CALCIUM SERPL-MCNC: 7.7 MG/DL (ref 8.5–10.1)
CALCIUM SERPL-MCNC: 7.9 MG/DL (ref 8.5–10.1)
CALCIUM SERPL-MCNC: 7.9 MG/DL (ref 8.5–10.1)
CALCIUM SERPL-MCNC: 8.2 MG/DL (ref 8.5–10.1)
CALCIUM SERPL-MCNC: 8.4 MG/DL (ref 8.5–10.1)
CANNABINOIDS UR QL SCN: NEGATIVE
CARBOXYTHC BLD-MCNC: NORMAL NG/ML
CARISOPRODOL IA: NORMAL
CASTS #/AREA URNS LPF: 10 /LPF (ref 0–2)
CHLORIDE SERPL-SCNC: 108 MMOL/L (ref 94–109)
CHLORIDE SERPL-SCNC: 109 MMOL/L (ref 94–109)
CHLORIDE SERPL-SCNC: 112 MMOL/L (ref 94–109)
CHLORIDE SERPL-SCNC: 113 MMOL/L (ref 94–109)
CHLORIDE SERPL-SCNC: 113 MMOL/L (ref 94–109)
CI HYPOCOAGULATION INDEX: 6.5 RATIO (ref 0–3)
CO2 SERPL-SCNC: 14 MMOL/L (ref 20–32)
CO2 SERPL-SCNC: 14 MMOL/L (ref 20–32)
CO2 SERPL-SCNC: 23 MMOL/L (ref 20–32)
CO2 SERPL-SCNC: 24 MMOL/L (ref 20–32)
CO2 SERPL-SCNC: 24 MMOL/L (ref 20–32)
COCAINE METABOLITE IA: NORMAL
COCAINE UR QL: NEGATIVE
COLOR UR AUTO: YELLOW
CORTIS SERPL-MCNC: 64.3 UG/DL (ref 4–22)
CREAT SERPL-MCNC: 1.58 MG/DL (ref 0.52–1.04)
CREAT SERPL-MCNC: 1.63 MG/DL (ref 0.52–1.04)
CREAT SERPL-MCNC: 1.67 MG/DL (ref 0.52–1.04)
CREAT SERPL-MCNC: 1.76 MG/DL (ref 0.52–1.04)
CREAT SERPL-MCNC: 1.77 MG/DL (ref 0.52–1.04)
CRP SERPL-MCNC: 63 MG/L (ref 0–8)
CRP SERPL-MCNC: 66 MG/L (ref 0–8)
D DIMER PPP FEU-MCNC: >20 UG/ML FEU (ref 0–0.5)
DECLARED MEDICATIONS: NORMAL
DIFFERENTIAL METHOD BLD: ABNORMAL
DRUGS ABSENT REPTD: NORMAL
DRUGS PRES NOT REPTD: NORMAL
EOSINOPHIL # BLD AUTO: 0.1 10E9/L (ref 0–0.7)
EOSINOPHIL NFR BLD AUTO: 0.2 %
ERYTHROCYTE [DISTWIDTH] IN BLOOD BY AUTOMATED COUNT: 14.8 % (ref 10–15)
ERYTHROCYTE [DISTWIDTH] IN BLOOD BY AUTOMATED COUNT: 14.9 % (ref 10–15)
ERYTHROCYTE [DISTWIDTH] IN BLOOD BY AUTOMATED COUNT: 15 % (ref 10–15)
ERYTHROCYTE [DISTWIDTH] IN BLOOD BY AUTOMATED COUNT: 15.1 % (ref 10–15)
ERYTHROCYTE [DISTWIDTH] IN BLOOD BY AUTOMATED COUNT: 15.1 % (ref 10–15)
ERYTHROCYTE [SEDIMENTATION RATE] IN BLOOD BY WESTERGREN METHOD: 7 MM/H (ref 0–30)
ERYTHROCYTE [SEDIMENTATION RATE] IN BLOOD BY WESTERGREN METHOD: 8 MM/H (ref 0–30)
ETHANOL BLD-MCNC: NORMAL MG/DL
ETHANOL UR QL SCN: NEGATIVE
FENTANYL IA: NORMAL
FIBRINOGEN PPP-MCNC: 164 MG/DL (ref 200–420)
FIBRINOGEN PPP-MCNC: 86 MG/DL (ref 200–420)
FIBRINOGEN PPP-MCNC: <61 MG/DL (ref 200–420)
G ACTUAL CLOT STRENGTH: 4 KD/SC (ref 4.5–11)
GABAPENTIN IA: NORMAL
GFR SERPL CREATININE-BSD FRML MDRD: 31 ML/MIN/{1.73_M2}
GFR SERPL CREATININE-BSD FRML MDRD: 31 ML/MIN/{1.73_M2}
GFR SERPL CREATININE-BSD FRML MDRD: 33 ML/MIN/{1.73_M2}
GFR SERPL CREATININE-BSD FRML MDRD: 34 ML/MIN/{1.73_M2}
GFR SERPL CREATININE-BSD FRML MDRD: 36 ML/MIN/{1.73_M2}
GLUCOSE BLD-MCNC: 112 MG/DL (ref 70–99)
GLUCOSE BLD-MCNC: 131 MG/DL (ref 70–99)
GLUCOSE BLD-MCNC: 235 MG/DL (ref 70–99)
GLUCOSE BLD-MCNC: 239 MG/DL (ref 70–99)
GLUCOSE BLD-MCNC: 84 MG/DL (ref 70–99)
GLUCOSE BLDC GLUCOMTR-MCNC: 101 MG/DL (ref 70–99)
GLUCOSE BLDC GLUCOMTR-MCNC: 107 MG/DL (ref 70–99)
GLUCOSE BLDC GLUCOMTR-MCNC: 126 MG/DL (ref 70–99)
GLUCOSE BLDC GLUCOMTR-MCNC: 182 MG/DL (ref 70–99)
GLUCOSE BLDC GLUCOMTR-MCNC: 251 MG/DL (ref 70–99)
GLUCOSE BLDC GLUCOMTR-MCNC: 255 MG/DL (ref 70–99)
GLUCOSE BLDC GLUCOMTR-MCNC: 83 MG/DL (ref 70–99)
GLUCOSE BLDC GLUCOMTR-MCNC: 94 MG/DL (ref 70–99)
GLUCOSE BLDC GLUCOMTR-MCNC: 99 MG/DL (ref 70–99)
GLUCOSE SERPL-MCNC: 110 MG/DL (ref 70–99)
GLUCOSE SERPL-MCNC: 129 MG/DL (ref 70–99)
GLUCOSE SERPL-MCNC: 244 MG/DL (ref 70–99)
GLUCOSE SERPL-MCNC: 255 MG/DL (ref 70–99)
GLUCOSE SERPL-MCNC: 85 MG/DL (ref 70–99)
GLUCOSE UR STRIP-MCNC: 30 MG/DL
GRAM STN SPEC: NORMAL
GRAM STN SPEC: NORMAL
GRAN CASTS #/AREA URNS LPF: 59 /LPF
HBA1C MFR BLD: 5.6 % (ref 0–5.6)
HCO3 BLD-SCNC: 15 MMOL/L (ref 21–28)
HCO3 BLD-SCNC: 15 MMOL/L (ref 21–28)
HCO3 BLD-SCNC: 20 MMOL/L (ref 21–28)
HCO3 BLD-SCNC: 20 MMOL/L (ref 21–28)
HCO3 BLD-SCNC: 22 MMOL/L (ref 21–28)
HCO3 BLD-SCNC: 22 MMOL/L (ref 21–28)
HCO3 BLD-SCNC: 23 MMOL/L (ref 21–28)
HCO3 BLD-SCNC: 24 MMOL/L (ref 21–28)
HCO3 BLDA-SCNC: 16 MMOL/L (ref 21–28)
HCO3 BLDA-SCNC: 26 MMOL/L (ref 21–28)
HCO3 BLDV-SCNC: 18 MMOL/L (ref 21–28)
HCO3 BLDV-SCNC: 25 MMOL/L (ref 21–28)
HCO3 BLDV-SCNC: 26 MMOL/L (ref 21–28)
HCT VFR BLD AUTO: 36.8 % (ref 35–47)
HCT VFR BLD AUTO: 38.2 % (ref 35–47)
HCT VFR BLD AUTO: 41.4 % (ref 35–47)
HCT VFR BLD AUTO: 44.2 % (ref 35–47)
HCT VFR BLD AUTO: 47.2 % (ref 35–47)
HGB BLD-MCNC: 11.7 G/DL (ref 11.7–15.7)
HGB BLD-MCNC: 12.3 G/DL (ref 11.7–15.7)
HGB BLD-MCNC: 13.2 G/DL (ref 11.7–15.7)
HGB BLD-MCNC: 13.9 G/DL (ref 11.7–15.7)
HGB BLD-MCNC: 14.5 G/DL (ref 11.7–15.7)
HGB FREE PLAS-MCNC: 150 MG/DL
HGB FREE PLAS-MCNC: 90 MG/DL
HGB UR QL STRIP: ABNORMAL
HYALINE CASTS #/AREA URNS LPF: 41 /LPF (ref 0–2)
IMM GRANULOCYTES # BLD: 1.1 10E9/L (ref 0–0.4)
IMM GRANULOCYTES NFR BLD: 3.3 %
INR PPP: 1.46 (ref 0.86–1.14)
INR PPP: 1.64 (ref 0.86–1.14)
INR PPP: 1.79 (ref 0.86–1.14)
INR PPP: 2.32 (ref 0.86–1.14)
INR PPP: 2.73 (ref 0.86–1.14)
INTERPRETATION ECG - MUSE: NORMAL
K TIME TO SPEC CLOT STRENGTH: 6.2 MINUTE (ref 1–3)
KCT BLD-ACNC: 126 SEC (ref 75–150)
KCT BLD-ACNC: 130 SEC (ref 75–150)
KCT BLD-ACNC: 134 SEC (ref 75–150)
KCT BLD-ACNC: 155 SEC (ref 75–150)
KCT BLD-ACNC: 159 SEC (ref 75–150)
KCT BLD-ACNC: 159 SEC (ref 75–150)
KCT BLD-ACNC: 163 SEC (ref 75–150)
KCT BLD-ACNC: 219 SEC (ref 75–150)
KCT BLD-ACNC: 300 SEC (ref 75–150)
KCT BLD-ACNC: 304 SEC (ref 75–150)
KETONES UR STRIP-MCNC: NEGATIVE MG/DL
LACTATE BLD-SCNC: 1.3 MMOL/L (ref 0.7–2)
LACTATE BLD-SCNC: 1.9 MMOL/L (ref 0.7–2)
LACTATE BLD-SCNC: 3.3 MMOL/L (ref 0.7–2)
LACTATE BLD-SCNC: 6.9 MMOL/L (ref 0.7–2)
LACTATE BLD-SCNC: 9 MMOL/L (ref 0.7–2)
LDH SERPL L TO P-CCNC: 951 U/L (ref 81–234)
LDH SERPL L TO P-CCNC: NORMAL U/L (ref 81–234)
LEUKOCYTE ESTERASE UR QL STRIP: NEGATIVE
LMWH PPP CHRO-ACNC: 0.11 IU/ML
LMWH PPP CHRO-ACNC: 0.2 IU/ML
LMWH PPP CHRO-ACNC: 0.5 IU/ML
LMWH PPP CHRO-ACNC: 0.96 IU/ML
LMWH PPP CHRO-ACNC: <0.1 IU/ML
LY30 LYSIS AT 30 MINUTES: 0 % (ref 0–8)
LY60 LYSIS AT 60 MINUTES: 0 % (ref 0–15)
LYMPHOCYTES # BLD AUTO: 1 10E9/L (ref 0.8–5.3)
LYMPHOCYTES NFR BLD AUTO: 3 %
MA MAXIMUM CLOT STRENGTH: 44.6 MM (ref 50–70)
MAGNESIUM SERPL-MCNC: 1.9 MG/DL (ref 1.6–2.3)
MAGNESIUM SERPL-MCNC: 1.9 MG/DL (ref 1.6–2.3)
MAGNESIUM SERPL-MCNC: 2.3 MG/DL (ref 1.6–2.3)
MAGNESIUM SERPL-MCNC: 2.3 MG/DL (ref 1.6–2.3)
MAGNESIUM SERPL-MCNC: 2.7 MG/DL (ref 1.6–2.3)
MCH RBC QN AUTO: 28.8 PG (ref 26.5–33)
MCH RBC QN AUTO: 29.1 PG (ref 26.5–33)
MCH RBC QN AUTO: 29.2 PG (ref 26.5–33)
MCH RBC QN AUTO: 29.4 PG (ref 26.5–33)
MCH RBC QN AUTO: 29.5 PG (ref 26.5–33)
MCHC RBC AUTO-ENTMCNC: 30.7 G/DL (ref 31.5–36.5)
MCHC RBC AUTO-ENTMCNC: 31.4 G/DL (ref 31.5–36.5)
MCHC RBC AUTO-ENTMCNC: 31.8 G/DL (ref 31.5–36.5)
MCHC RBC AUTO-ENTMCNC: 31.9 G/DL (ref 31.5–36.5)
MCHC RBC AUTO-ENTMCNC: 32.2 G/DL (ref 31.5–36.5)
MCV RBC AUTO: 90 FL (ref 78–100)
MCV RBC AUTO: 91 FL (ref 78–100)
MCV RBC AUTO: 92 FL (ref 78–100)
MCV RBC AUTO: 94 FL (ref 78–100)
MCV RBC AUTO: 96 FL (ref 78–100)
MEPERIDINE SERPLBLD-MCNC: NORMAL NG/ML
METHADONE BLD-MCNC: NORMAL UG/L
MISCELLANEOUS TEST: NORMAL
MIXED CELL CASTS #/AREA URNS LPF: 27 /LPF
MONOCYTES # BLD AUTO: 1.3 10E9/L (ref 0–1.3)
MONOCYTES NFR BLD AUTO: 3.8 %
MRSA DNA SPEC QL NAA+PROBE: NEGATIVE
MUCOUS THREADS #/AREA URNS LPF: PRESENT /LPF
NEUTROPHILS # BLD AUTO: 30.8 10E9/L (ref 1.6–8.3)
NEUTROPHILS NFR BLD AUTO: 89.3 %
NITRATE UR QL: NEGATIVE
NRBC # BLD AUTO: 0 10*3/UL
NRBC BLD AUTO-RTO: 0 /100
NUM BPU REQUESTED: 3
NUM BPU REQUESTED: 5
O2/TOTAL GAS SETTING VFR VENT: 45 %
O2/TOTAL GAS SETTING VFR VENT: 50 %
OPIATES SPEC-MCNC: NORMAL NG/ML
OPIATES UR QL SCN: NEGATIVE
OTHER DRUGS DETECTED: NORMAL
OXYCODONE SERPLBLD SCN-MCNC: NORMAL NG/ML
OXYHGB MFR BLD: 94 % (ref 92–100)
OXYHGB MFR BLD: 94 % (ref 92–100)
OXYHGB MFR BLD: 96 % (ref 92–100)
OXYHGB MFR BLD: 97 % (ref 92–100)
OXYHGB MFR BLD: 97 % (ref 92–100)
OXYHGB MFR BLD: 98 % (ref 92–100)
OXYHGB MFR BLD: 99 % (ref 92–100)
OXYHGB MFR BLDA: 98 % (ref 75–100)
OXYHGB MFR BLDA: 99 % (ref 75–100)
OXYHGB MFR BLDV: 71 %
OXYHGB MFR BLDV: 78 %
OXYHGB MFR BLDV: 89 %
PCO2 BLD: 27 MM HG (ref 35–45)
PCO2 BLD: 27 MM HG (ref 35–45)
PCO2 BLD: 28 MM HG (ref 35–45)
PCO2 BLD: 30 MM HG (ref 35–45)
PCO2 BLD: 32 MM HG (ref 35–45)
PCO2 BLD: 32 MM HG (ref 35–45)
PCO2 BLD: 33 MM HG (ref 35–45)
PCO2 BLD: 34 MM HG (ref 35–45)
PCO2 BLD: 35 MM HG (ref 35–45)
PCO2 BLD: 36 MM HG (ref 35–45)
PCO2 BLD: 40 MM HG (ref 35–45)
PCO2 BLDA: 28 MM HG (ref 35–45)
PCO2 BLDA: 45 MM HG (ref 35–45)
PCO2 BLDV: 35 MM HG (ref 40–50)
PCO2 BLDV: 41 MM HG (ref 40–50)
PCO2 BLDV: 48 MM HG (ref 40–50)
PCP SPEC-MCNC: NORMAL NG/ML
PH BLD: 7.31 PH (ref 7.35–7.45)
PH BLD: 7.35 PH (ref 7.35–7.45)
PH BLD: 7.39 PH (ref 7.35–7.45)
PH BLD: 7.4 PH (ref 7.35–7.45)
PH BLD: 7.43 PH (ref 7.35–7.45)
PH BLD: 7.43 PH (ref 7.35–7.45)
PH BLD: 7.44 PH (ref 7.35–7.45)
PH BLD: 7.45 PH (ref 7.35–7.45)
PH BLD: 7.47 PH (ref 7.35–7.45)
PH BLD: 7.48 PH (ref 7.35–7.45)
PH BLD: 7.51 PH (ref 7.35–7.45)
PH BLDA: 7.36 PH (ref 7.35–7.45)
PH BLDA: 7.36 PH (ref 7.35–7.45)
PH BLDV: 7.32 PH (ref 7.32–7.43)
PH BLDV: 7.34 PH (ref 7.32–7.43)
PH BLDV: 7.4 PH (ref 7.32–7.43)
PH UR STRIP: 6 PH (ref 5–7)
PHOSPHATE SERPL-MCNC: 3 MG/DL (ref 2.5–4.5)
PHOSPHATE SERPL-MCNC: 4 MG/DL (ref 2.5–4.5)
PLATELET # BLD AUTO: 140 10E9/L (ref 150–450)
PLATELET # BLD AUTO: 156 10E9/L (ref 150–450)
PLATELET # BLD AUTO: 157 10E9/L (ref 150–450)
PLATELET # BLD AUTO: 178 10E9/L (ref 150–450)
PLATELET # BLD AUTO: 189 10E9/L (ref 150–450)
PO2 BLD: 107 MM HG (ref 80–105)
PO2 BLD: 129 MM HG (ref 80–105)
PO2 BLD: 236 MM HG (ref 80–105)
PO2 BLD: 73 MM HG (ref 80–105)
PO2 BLD: 87 MM HG (ref 80–105)
PO2 BLD: 87 MM HG (ref 80–105)
PO2 BLD: 94 MM HG (ref 80–105)
PO2 BLD: 95 MM HG (ref 80–105)
PO2 BLD: 99 MM HG (ref 80–105)
PO2 BLDA: 182 MM HG (ref 80–105)
PO2 BLDA: 199 MM HG (ref 80–105)
PO2 BLDV: 43 MM HG (ref 25–47)
PO2 BLDV: 51 MM HG (ref 25–47)
PO2 BLDV: 61 MM HG (ref 25–47)
POTASSIUM SERPL-SCNC: 3.3 MMOL/L (ref 3.4–5.3)
POTASSIUM SERPL-SCNC: 3.4 MMOL/L (ref 3.4–5.3)
POTASSIUM SERPL-SCNC: 3.5 MMOL/L (ref 3.4–5.3)
POTASSIUM SERPL-SCNC: 4.1 MMOL/L (ref 3.4–5.3)
POTASSIUM SERPL-SCNC: 4.6 MMOL/L (ref 3.4–5.3)
PROCALCITONIN SERPL-MCNC: 17.75 NG/ML
PROPOXYPH SERPL-MCNC: NORMAL NG/ML
PROPOXYPH SPEC-MCNC: NORMAL NG/ML
PROT SERPL-MCNC: 5.2 G/DL (ref 6.8–8.8)
PROT SERPL-MCNC: 5.3 G/DL (ref 6.8–8.8)
PROT SERPL-MCNC: 5.4 G/DL (ref 6.8–8.8)
PROT SERPL-MCNC: 5.5 G/DL (ref 6.8–8.8)
PROT SERPL-MCNC: 6 G/DL (ref 6.8–8.8)
R TIME UNTIL CLOT FORMS: 6.8 MINUTE (ref 5–10)
RBC # BLD AUTO: 4.02 10E12/L (ref 3.8–5.2)
RBC # BLD AUTO: 4.21 10E12/L (ref 3.8–5.2)
RBC # BLD AUTO: 4.58 10E12/L (ref 3.8–5.2)
RBC # BLD AUTO: 4.71 10E12/L (ref 3.8–5.2)
RBC # BLD AUTO: 4.94 10E12/L (ref 3.8–5.2)
RBC #/AREA URNS AUTO: 38 /HPF (ref 0–2)
SODIUM SERPL-SCNC: 140 MMOL/L (ref 133–144)
SODIUM SERPL-SCNC: 141 MMOL/L (ref 133–144)
SODIUM SERPL-SCNC: 146 MMOL/L (ref 133–144)
SODIUM SERPL-SCNC: 147 MMOL/L (ref 133–144)
SODIUM SERPL-SCNC: 148 MMOL/L (ref 133–144)
SOURCE: ABNORMAL
SP GR UR STRIP: 1.01 (ref 1–1.03)
SPECIMEN EXP DATE BLD: NORMAL
SPECIMEN SOURCE: NORMAL
SPECIMEN SOURCE: NORMAL
SQUAMOUS #/AREA URNS AUTO: 5 /HPF (ref 0–1)
TRAMADOL BLD-MCNC: NORMAL NG/ML
TRANS CELLS #/AREA URNS HPF: <1 /HPF (ref 0–1)
TRIGL SERPL-MCNC: 152 MG/DL
TROPONIN I SERPL-MCNC: 0.34 UG/L (ref 0–0.04)
TROPONIN I SERPL-MCNC: 0.63 UG/L (ref 0–0.04)
TROPONIN I SERPL-MCNC: 0.88 UG/L (ref 0–0.04)
TROPONIN I SERPL-MCNC: 1.47 UG/L (ref 0–0.04)
TROPONIN I SERPL-MCNC: 1.71 UG/L (ref 0–0.04)
UROBILINOGEN UR STRIP-MCNC: NORMAL MG/DL (ref 0–2)
WAXY CASTS #/AREA URNS LPF: 14 /LPF
WBC # BLD AUTO: 21.2 10E9/L (ref 4–11)
WBC # BLD AUTO: 23.4 10E9/L (ref 4–11)
WBC # BLD AUTO: 25.1 10E9/L (ref 4–11)
WBC # BLD AUTO: 32.9 10E9/L (ref 4–11)
WBC # BLD AUTO: 34.4 10E9/L (ref 4–11)
WBC #/AREA URNS AUTO: 2 /HPF (ref 0–5)

## 2019-09-10 PROCEDURE — 27210804 ZZH SHEATH CR3

## 2019-09-10 PROCEDURE — 25000128 H RX IP 250 OP 636: Performed by: INTERNAL MEDICINE

## 2019-09-10 PROCEDURE — 40000014 ZZH STATISTIC ARTERIAL MONITORING DAILY

## 2019-09-10 PROCEDURE — 94640 AIRWAY INHALATION TREATMENT: CPT | Mod: 76

## 2019-09-10 PROCEDURE — 93925 LOWER EXTREMITY STUDY: CPT | Mod: 26 | Performed by: INTERNAL MEDICINE

## 2019-09-10 PROCEDURE — 93325 DOPPLER ECHO COLOR FLOW MAPG: CPT | Mod: 26 | Performed by: INTERNAL MEDICINE

## 2019-09-10 PROCEDURE — 25800030 ZZH RX IP 258 OP 636: Performed by: INTERNAL MEDICINE

## 2019-09-10 PROCEDURE — 82805 BLOOD GASES W/O2 SATURATION: CPT | Performed by: INTERNAL MEDICINE

## 2019-09-10 PROCEDURE — 86923 COMPATIBILITY TEST ELECTRIC: CPT

## 2019-09-10 PROCEDURE — 93925 LOWER EXTREMITY STUDY: CPT

## 2019-09-10 PROCEDURE — 85379 FIBRIN DEGRADATION QUANT: CPT | Performed by: STUDENT IN AN ORGANIZED HEALTH CARE EDUCATION/TRAINING PROGRAM

## 2019-09-10 PROCEDURE — 80053 COMPREHEN METABOLIC PANEL: CPT | Performed by: STUDENT IN AN ORGANIZED HEALTH CARE EDUCATION/TRAINING PROGRAM

## 2019-09-10 PROCEDURE — 82533 TOTAL CORTISOL: CPT | Performed by: INTERNAL MEDICINE

## 2019-09-10 PROCEDURE — 27210437 ZZH NUTRITION PRODUCT SEMIELEM INTERMED LITER

## 2019-09-10 PROCEDURE — 85379 FIBRIN DEGRADATION QUANT: CPT | Performed by: INTERNAL MEDICINE

## 2019-09-10 PROCEDURE — 25000128 H RX IP 250 OP 636: Performed by: STUDENT IN AN ORGANIZED HEALTH CARE EDUCATION/TRAINING PROGRAM

## 2019-09-10 PROCEDURE — 44500 INTRO GASTROINTESTINAL TUBE: CPT

## 2019-09-10 PROCEDURE — 87040 BLOOD CULTURE FOR BACTERIA: CPT | Performed by: INTERNAL MEDICINE

## 2019-09-10 PROCEDURE — 85396 CLOTTING ASSAY WHOLE BLOOD: CPT | Performed by: INTERNAL MEDICINE

## 2019-09-10 PROCEDURE — 25000125 ZZHC RX 250: Performed by: INTERNAL MEDICINE

## 2019-09-10 PROCEDURE — 70450 CT HEAD/BRAIN W/O DYE: CPT

## 2019-09-10 PROCEDURE — 3E06317 INTRODUCTION OF OTHER THROMBOLYTIC INTO CENTRAL ARTERY, PERCUTANEOUS APPROACH: ICD-10-PCS | Performed by: RADIOLOGY

## 2019-09-10 PROCEDURE — 87186 SC STD MICRODIL/AGAR DIL: CPT | Performed by: INTERNAL MEDICINE

## 2019-09-10 PROCEDURE — 87070 CULTURE OTHR SPECIMN AEROBIC: CPT | Performed by: INTERNAL MEDICINE

## 2019-09-10 PROCEDURE — 75743 ARTERY X-RAYS LUNGS: CPT

## 2019-09-10 PROCEDURE — P9016 RBC LEUKOCYTES REDUCED: HCPCS

## 2019-09-10 PROCEDURE — 86140 C-REACTIVE PROTEIN: CPT | Performed by: INTERNAL MEDICINE

## 2019-09-10 PROCEDURE — 25000125 ZZHC RX 250: Performed by: STUDENT IN AN ORGANIZED HEALTH CARE EDUCATION/TRAINING PROGRAM

## 2019-09-10 PROCEDURE — 94640 AIRWAY INHALATION TREATMENT: CPT

## 2019-09-10 PROCEDURE — 93010 ELECTROCARDIOGRAM REPORT: CPT | Performed by: INTERNAL MEDICINE

## 2019-09-10 PROCEDURE — C1769 GUIDE WIRE: HCPCS

## 2019-09-10 PROCEDURE — 74176 CT ABD & PELVIS W/O CONTRAST: CPT

## 2019-09-10 PROCEDURE — 27210908 ZZH NEEDLE CR4

## 2019-09-10 PROCEDURE — 85730 THROMBOPLASTIN TIME PARTIAL: CPT | Performed by: STUDENT IN AN ORGANIZED HEALTH CARE EDUCATION/TRAINING PROGRAM

## 2019-09-10 PROCEDURE — 85520 HEPARIN ASSAY: CPT | Performed by: STUDENT IN AN ORGANIZED HEALTH CARE EDUCATION/TRAINING PROGRAM

## 2019-09-10 PROCEDURE — 36592 COLLECT BLOOD FROM PICC: CPT | Performed by: INTERNAL MEDICINE

## 2019-09-10 PROCEDURE — 85347 COAGULATION TIME ACTIVATED: CPT

## 2019-09-10 PROCEDURE — 86850 RBC ANTIBODY SCREEN: CPT

## 2019-09-10 PROCEDURE — 40000986 XR ABDOMEN PORT 1 VW

## 2019-09-10 PROCEDURE — 86316 IMMUNOASSAY TUMOR OTHER: CPT | Performed by: INTERNAL MEDICINE

## 2019-09-10 PROCEDURE — 82330 ASSAY OF CALCIUM: CPT | Performed by: INTERNAL MEDICINE

## 2019-09-10 PROCEDURE — 82947 ASSAY GLUCOSE BLOOD QUANT: CPT | Performed by: INTERNAL MEDICINE

## 2019-09-10 PROCEDURE — 85520 HEPARIN ASSAY: CPT | Performed by: INTERNAL MEDICINE

## 2019-09-10 PROCEDURE — 27210743 ZZH CATH CR11

## 2019-09-10 PROCEDURE — 85300 ANTITHROMBIN III ACTIVITY: CPT | Performed by: INTERNAL MEDICINE

## 2019-09-10 PROCEDURE — 86900 BLOOD TYPING SEROLOGIC ABO: CPT

## 2019-09-10 PROCEDURE — 25000128 H RX IP 250 OP 636

## 2019-09-10 PROCEDURE — 87641 MR-STAPH DNA AMP PROBE: CPT | Performed by: INTERNAL MEDICINE

## 2019-09-10 PROCEDURE — 84478 ASSAY OF TRIGLYCERIDES: CPT | Performed by: INTERNAL MEDICINE

## 2019-09-10 PROCEDURE — 40000281 ZZH STATISTIC TRANSPORT TIME EA 15 MIN

## 2019-09-10 PROCEDURE — 80307 DRUG TEST PRSMV CHEM ANLYZR: CPT | Performed by: INTERNAL MEDICINE

## 2019-09-10 PROCEDURE — 83615 LACTATE (LD) (LDH) ENZYME: CPT | Performed by: INTERNAL MEDICINE

## 2019-09-10 PROCEDURE — 00000146 ZZHCL STATISTIC GLUCOSE BY METER IP

## 2019-09-10 PROCEDURE — 85384 FIBRINOGEN ACTIVITY: CPT | Performed by: INTERNAL MEDICINE

## 2019-09-10 PROCEDURE — 84100 ASSAY OF PHOSPHORUS: CPT | Performed by: STUDENT IN AN ORGANIZED HEALTH CARE EDUCATION/TRAINING PROGRAM

## 2019-09-10 PROCEDURE — 87205 SMEAR GRAM STAIN: CPT | Performed by: INTERNAL MEDICINE

## 2019-09-10 PROCEDURE — 27210886 ZZH ACCESSORY CR5

## 2019-09-10 PROCEDURE — 85027 COMPLETE CBC AUTOMATED: CPT | Performed by: STUDENT IN AN ORGANIZED HEALTH CARE EDUCATION/TRAINING PROGRAM

## 2019-09-10 PROCEDURE — 93005 ELECTROCARDIOGRAM TRACING: CPT

## 2019-09-10 PROCEDURE — 85027 COMPLETE CBC AUTOMATED: CPT | Performed by: INTERNAL MEDICINE

## 2019-09-10 PROCEDURE — 27210889 ZZH ACCESSORY CR8

## 2019-09-10 PROCEDURE — 85025 COMPLETE CBC W/AUTO DIFF WBC: CPT | Performed by: INTERNAL MEDICINE

## 2019-09-10 PROCEDURE — 85384 FIBRINOGEN ACTIVITY: CPT | Performed by: STUDENT IN AN ORGANIZED HEALTH CARE EDUCATION/TRAINING PROGRAM

## 2019-09-10 PROCEDURE — 37184 PRIM ART M-THRMBC 1ST VSL: CPT | Mod: 50

## 2019-09-10 PROCEDURE — 27211040 ZZH CONTINUOUS NEBULIZER MICRO PUMP

## 2019-09-10 PROCEDURE — 84484 ASSAY OF TROPONIN QUANT: CPT | Performed by: INTERNAL MEDICINE

## 2019-09-10 PROCEDURE — 20000004 ZZH R&B ICU UMMC

## 2019-09-10 PROCEDURE — 84100 ASSAY OF PHOSPHORUS: CPT | Performed by: INTERNAL MEDICINE

## 2019-09-10 PROCEDURE — 33949 ECMO/ECLS DAILY MGMT ARTERY: CPT

## 2019-09-10 PROCEDURE — 80053 COMPREHEN METABOLIC PANEL: CPT | Performed by: INTERNAL MEDICINE

## 2019-09-10 PROCEDURE — 83735 ASSAY OF MAGNESIUM: CPT | Performed by: INTERNAL MEDICINE

## 2019-09-10 PROCEDURE — 87640 STAPH A DNA AMP PROBE: CPT | Performed by: INTERNAL MEDICINE

## 2019-09-10 PROCEDURE — 02CQ3ZZ EXTIRPATION OF MATTER FROM RIGHT PULMONARY ARTERY, PERCUTANEOUS APPROACH: ICD-10-PCS | Performed by: RADIOLOGY

## 2019-09-10 PROCEDURE — 83605 ASSAY OF LACTIC ACID: CPT | Performed by: INTERNAL MEDICINE

## 2019-09-10 PROCEDURE — 81001 URINALYSIS AUTO W/SCOPE: CPT | Performed by: INTERNAL MEDICINE

## 2019-09-10 PROCEDURE — 83036 HEMOGLOBIN GLYCOSYLATED A1C: CPT | Performed by: INTERNAL MEDICINE

## 2019-09-10 PROCEDURE — 83735 ASSAY OF MAGNESIUM: CPT | Performed by: STUDENT IN AN ORGANIZED HEALTH CARE EDUCATION/TRAINING PROGRAM

## 2019-09-10 PROCEDURE — 25500064 ZZH RX 255 OP 636: Performed by: STUDENT IN AN ORGANIZED HEALTH CARE EDUCATION/TRAINING PROGRAM

## 2019-09-10 PROCEDURE — 27210738 ZZH ACCESSORY CR2

## 2019-09-10 PROCEDURE — 93308 TTE F-UP OR LMTD: CPT

## 2019-09-10 PROCEDURE — 80347 BENZODIAZEPINES 13 OR MORE: CPT

## 2019-09-10 PROCEDURE — 25000132 ZZH RX MED GY IP 250 OP 250 PS 637: Performed by: STUDENT IN AN ORGANIZED HEALTH CARE EDUCATION/TRAINING PROGRAM

## 2019-09-10 PROCEDURE — 84145 PROCALCITONIN (PCT): CPT | Performed by: INTERNAL MEDICINE

## 2019-09-10 PROCEDURE — 85610 PROTHROMBIN TIME: CPT | Performed by: STUDENT IN AN ORGANIZED HEALTH CARE EDUCATION/TRAINING PROGRAM

## 2019-09-10 PROCEDURE — 80320 DRUG SCREEN QUANTALCOHOLS: CPT | Performed by: INTERNAL MEDICINE

## 2019-09-10 PROCEDURE — 02CP3ZZ EXTIRPATION OF MATTER FROM PULMONARY TRUNK, PERCUTANEOUS APPROACH: ICD-10-PCS | Performed by: RADIOLOGY

## 2019-09-10 PROCEDURE — 95951 ZZHC EEG VIDEO EACH 24 HR: CPT | Mod: ZF

## 2019-09-10 PROCEDURE — C1887 CATHETER, GUIDING: HCPCS

## 2019-09-10 PROCEDURE — 27210807 ZZH SHEATH CR6

## 2019-09-10 PROCEDURE — 71045 X-RAY EXAM CHEST 1 VIEW: CPT

## 2019-09-10 PROCEDURE — 86901 BLOOD TYPING SEROLOGIC RH(D): CPT

## 2019-09-10 PROCEDURE — P9012 CRYOPRECIPITATE EACH UNIT: HCPCS | Performed by: STUDENT IN AN ORGANIZED HEALTH CARE EDUCATION/TRAINING PROGRAM

## 2019-09-10 PROCEDURE — 80354 DRUG SCREENING FENTANYL: CPT

## 2019-09-10 PROCEDURE — 80307 DRUG TEST PRSMV CHEM ANLYZR: CPT

## 2019-09-10 PROCEDURE — 93308 TTE F-UP OR LMTD: CPT | Mod: 26 | Performed by: INTERNAL MEDICINE

## 2019-09-10 PROCEDURE — 85610 PROTHROMBIN TIME: CPT | Performed by: INTERNAL MEDICINE

## 2019-09-10 PROCEDURE — 94003 VENT MGMT INPAT SUBQ DAY: CPT

## 2019-09-10 PROCEDURE — 37211 THROMBOLYTIC ART THERAPY: CPT | Mod: 50

## 2019-09-10 PROCEDURE — 27210732 ZZH ACCESSORY CR1

## 2019-09-10 PROCEDURE — 87077 CULTURE AEROBIC IDENTIFY: CPT | Performed by: INTERNAL MEDICINE

## 2019-09-10 PROCEDURE — 84484 ASSAY OF TROPONIN QUANT: CPT | Performed by: STUDENT IN AN ORGANIZED HEALTH CARE EDUCATION/TRAINING PROGRAM

## 2019-09-10 PROCEDURE — 36014 PLACE CATHETER IN ARTERY: CPT | Mod: 50

## 2019-09-10 PROCEDURE — 27211402 ZZH SENSOR NIRS OXIMETER, ADULT

## 2019-09-10 PROCEDURE — 27210780 ZZH KIT CR3

## 2019-09-10 PROCEDURE — 85652 RBC SED RATE AUTOMATED: CPT | Performed by: INTERNAL MEDICINE

## 2019-09-10 PROCEDURE — 99291 CRITICAL CARE FIRST HOUR: CPT | Mod: GC | Performed by: INTERNAL MEDICINE

## 2019-09-10 PROCEDURE — 40000275 ZZH STATISTIC RCP TIME EA 10 MIN

## 2019-09-10 PROCEDURE — 93321 DOPPLER ECHO F-UP/LMTD STD: CPT | Mod: 26 | Performed by: INTERNAL MEDICINE

## 2019-09-10 PROCEDURE — 33949 ECMO/ECLS DAILY MGMT ARTERY: CPT | Performed by: INTERNAL MEDICINE

## 2019-09-10 PROCEDURE — C1757 CATH, THROMBECTOMY/EMBOLECT: HCPCS

## 2019-09-10 PROCEDURE — 40000344 ZZHCL STATISTIC THAWING COMPONENT: Performed by: STUDENT IN AN ORGANIZED HEALTH CARE EDUCATION/TRAINING PROGRAM

## 2019-09-10 PROCEDURE — 85730 THROMBOPLASTIN TIME PARTIAL: CPT | Performed by: INTERNAL MEDICINE

## 2019-09-10 PROCEDURE — 25800030 ZZH RX IP 258 OP 636: Performed by: STUDENT IN AN ORGANIZED HEALTH CARE EDUCATION/TRAINING PROGRAM

## 2019-09-10 PROCEDURE — 83051 HEMOGLOBIN PLASMA: CPT | Performed by: INTERNAL MEDICINE

## 2019-09-10 PROCEDURE — C9113 INJ PANTOPRAZOLE SODIUM, VIA: HCPCS | Performed by: INTERNAL MEDICINE

## 2019-09-10 RX ORDER — LANOLIN ALCOHOL/MO/W.PET/CERES
100 CREAM (GRAM) TOPICAL DAILY
Status: DISCONTINUED | OUTPATIENT
Start: 2019-09-11 | End: 2019-10-07 | Stop reason: HOSPADM

## 2019-09-10 RX ORDER — POTASSIUM CHLORIDE 750 MG/1
20-40 TABLET, EXTENDED RELEASE ORAL
Status: DISCONTINUED | OUTPATIENT
Start: 2019-09-10 | End: 2019-10-04

## 2019-09-10 RX ORDER — POTASSIUM CHLORIDE 1.5 G/1.58G
20-40 POWDER, FOR SOLUTION ORAL
Status: DISCONTINUED | OUTPATIENT
Start: 2019-09-10 | End: 2019-10-04

## 2019-09-10 RX ORDER — VANCOMYCIN HYDROCHLORIDE 1 G/200ML
1000 INJECTION, SOLUTION INTRAVENOUS ONCE
Status: COMPLETED | OUTPATIENT
Start: 2019-09-10 | End: 2019-09-10

## 2019-09-10 RX ORDER — LANOLIN ALCOHOL/MO/W.PET/CERES
100 CREAM (GRAM) TOPICAL DAILY
Status: DISCONTINUED | OUTPATIENT
Start: 2019-09-10 | End: 2019-09-10

## 2019-09-10 RX ORDER — POTASSIUM CHLORIDE 7.45 MG/ML
10 INJECTION INTRAVENOUS
Status: DISCONTINUED | OUTPATIENT
Start: 2019-09-10 | End: 2019-10-04

## 2019-09-10 RX ORDER — NICOTINE POLACRILEX 4 MG
15-30 LOZENGE BUCCAL
Status: DISCONTINUED | OUTPATIENT
Start: 2019-09-10 | End: 2019-09-17

## 2019-09-10 RX ORDER — LIDOCAINE HYDROCHLORIDE 20 MG/ML
5 SOLUTION OROPHARYNGEAL ONCE
Status: COMPLETED | OUTPATIENT
Start: 2019-09-10 | End: 2019-09-10

## 2019-09-10 RX ORDER — POTASSIUM CHLORIDE 29.8 MG/ML
20 INJECTION INTRAVENOUS
Status: DISCONTINUED | OUTPATIENT
Start: 2019-09-10 | End: 2019-10-04

## 2019-09-10 RX ORDER — MAGNESIUM SULFATE HEPTAHYDRATE 40 MG/ML
4 INJECTION, SOLUTION INTRAVENOUS EVERY 4 HOURS PRN
Status: DISCONTINUED | OUTPATIENT
Start: 2019-09-10 | End: 2019-10-07 | Stop reason: HOSPADM

## 2019-09-10 RX ORDER — SODIUM CHLORIDE 9 MG/ML
INJECTION, SOLUTION INTRAVENOUS CONTINUOUS
Status: DISCONTINUED | OUTPATIENT
Start: 2019-09-10 | End: 2019-09-15 | Stop reason: CLARIF

## 2019-09-10 RX ORDER — NOREPINEPHRINE BITARTRATE 0.06 MG/ML
INJECTION, SOLUTION INTRAVENOUS CONTINUOUS PRN
Status: COMPLETED | OUTPATIENT
Start: 2019-09-10 | End: 2019-09-10

## 2019-09-10 RX ORDER — NICOTINE POLACRILEX 4 MG
15-30 LOZENGE BUCCAL
Status: DISCONTINUED | OUTPATIENT
Start: 2019-09-10 | End: 2019-09-10

## 2019-09-10 RX ORDER — DEXTROSE MONOHYDRATE 25 G/50ML
25-50 INJECTION, SOLUTION INTRAVENOUS
Status: DISCONTINUED | OUTPATIENT
Start: 2019-09-10 | End: 2019-09-10

## 2019-09-10 RX ORDER — DEXTROSE MONOHYDRATE 25 G/50ML
25-50 INJECTION, SOLUTION INTRAVENOUS
Status: DISCONTINUED | OUTPATIENT
Start: 2019-09-10 | End: 2019-09-17

## 2019-09-10 RX ORDER — POTASSIUM CL/LIDO/0.9 % NACL 10MEQ/0.1L
10 INTRAVENOUS SOLUTION, PIGGYBACK (ML) INTRAVENOUS
Status: DISCONTINUED | OUTPATIENT
Start: 2019-09-10 | End: 2019-10-04

## 2019-09-10 RX ADMIN — SODIUM BICARBONATE 100 MEQ: 84 INJECTION, SOLUTION INTRAVENOUS at 03:07

## 2019-09-10 RX ADMIN — IODIXANOL 150 ML: 320 INJECTION, SOLUTION INTRAVASCULAR at 03:17

## 2019-09-10 RX ADMIN — PIPERACILLIN SODIUM AND TAZOBACTAM SODIUM 3.38 G: 3; .375 INJECTION, POWDER, LYOPHILIZED, FOR SOLUTION INTRAVENOUS at 16:45

## 2019-09-10 RX ADMIN — ALTEPLASE 1 MG/HR: 2.2 INJECTION, POWDER, LYOPHILIZED, FOR SOLUTION INTRAVENOUS at 03:37

## 2019-09-10 RX ADMIN — Medication 7 MG/HR: at 13:53

## 2019-09-10 RX ADMIN — PIPERACILLIN SODIUM AND TAZOBACTAM SODIUM 3.38 G: 3; .375 INJECTION, POWDER, LYOPHILIZED, FOR SOLUTION INTRAVENOUS at 22:22

## 2019-09-10 RX ADMIN — VANCOMYCIN HYDROCHLORIDE 1500 MG: 10 INJECTION, POWDER, LYOPHILIZED, FOR SOLUTION INTRAVENOUS at 07:50

## 2019-09-10 RX ADMIN — ALBUTEROL SULFATE 2.5 MG: 2.5 SOLUTION RESPIRATORY (INHALATION) at 20:35

## 2019-09-10 RX ADMIN — ALBUTEROL SULFATE 2.5 MG: 2.5 SOLUTION RESPIRATORY (INHALATION) at 17:16

## 2019-09-10 RX ADMIN — Medication: at 22:22

## 2019-09-10 RX ADMIN — VASOPRESSIN 1 UNITS/HR: 20 INJECTION INTRAVENOUS at 09:56

## 2019-09-10 RX ADMIN — ALBUTEROL SULFATE 2.5 MG: 2.5 SOLUTION RESPIRATORY (INHALATION) at 12:31

## 2019-09-10 RX ADMIN — VANCOMYCIN HYDROCHLORIDE 1000 MG: 1 INJECTION, SOLUTION INTRAVENOUS at 16:09

## 2019-09-10 RX ADMIN — SODIUM CHLORIDE: 9 INJECTION, SOLUTION INTRAVENOUS at 05:49

## 2019-09-10 RX ADMIN — HUMAN INSULIN 2 UNITS/HR: 100 INJECTION, SOLUTION SUBCUTANEOUS at 05:34

## 2019-09-10 RX ADMIN — Medication 2 G: at 09:18

## 2019-09-10 RX ADMIN — HEPARIN SODIUM 3 ML/HR: 1000 INJECTION, SOLUTION INTRAVENOUS; SUBCUTANEOUS at 05:51

## 2019-09-10 RX ADMIN — HEPARIN SODIUM 500 UNITS/HR: 10000 INJECTION, SOLUTION INTRAVENOUS at 03:38

## 2019-09-10 RX ADMIN — PIPERACILLIN SODIUM AND TAZOBACTAM SODIUM 3.38 G: 3; .375 INJECTION, POWDER, LYOPHILIZED, FOR SOLUTION INTRAVENOUS at 11:13

## 2019-09-10 RX ADMIN — MULTIVITAMIN 15 ML: LIQUID ORAL at 15:59

## 2019-09-10 RX ADMIN — Medication 50 MCG/HR: at 05:30

## 2019-09-10 RX ADMIN — SODIUM BICARBONATE 100 MEQ: 84 INJECTION, SOLUTION INTRAVENOUS at 05:45

## 2019-09-10 RX ADMIN — ALTEPLASE 1 MG/HR: 2.2 INJECTION, POWDER, LYOPHILIZED, FOR SOLUTION INTRAVENOUS at 09:00

## 2019-09-10 RX ADMIN — Medication: at 04:54

## 2019-09-10 RX ADMIN — Medication 100 MG: at 16:00

## 2019-09-10 RX ADMIN — LIDOCAINE HYDROCHLORIDE 5 ML: 20 SOLUTION ORAL; TOPICAL at 13:55

## 2019-09-10 RX ADMIN — CALCIUM CHLORIDE 1 G: 100 INJECTION INTRAVENOUS; INTRAVENTRICULAR at 16:19

## 2019-09-10 RX ADMIN — CALCIUM CHLORIDE 1 G: 100 INJECTION, SOLUTION INTRAVENOUS at 06:59

## 2019-09-10 RX ADMIN — POTASSIUM CHLORIDE 20 MEQ: 29.8 INJECTION, SOLUTION INTRAVENOUS at 11:17

## 2019-09-10 RX ADMIN — Medication 6 MG/HR: at 05:01

## 2019-09-10 RX ADMIN — PIPERACILLIN SODIUM AND TAZOBACTAM SODIUM 3.38 G: 3; .375 INJECTION, POWDER, LYOPHILIZED, FOR SOLUTION INTRAVENOUS at 05:49

## 2019-09-10 RX ADMIN — POTASSIUM CHLORIDE 20 MEQ: 29.8 INJECTION, SOLUTION INTRAVENOUS at 10:09

## 2019-09-10 RX ADMIN — PANTOPRAZOLE SODIUM 40 MG: 40 INJECTION, POWDER, LYOPHILIZED, FOR SOLUTION INTRAVENOUS at 07:50

## 2019-09-10 RX ADMIN — ALBUTEROL SULFATE 2.5 MG: 2.5 SOLUTION RESPIRATORY (INHALATION) at 08:34

## 2019-09-10 ASSESSMENT — ACTIVITIES OF DAILY LIVING (ADL)
ADLS_ACUITY_SCORE: 19
ADLS_ACUITY_SCORE: 19
ADLS_ACUITY_SCORE: 15
ADLS_ACUITY_SCORE: 19

## 2019-09-10 ASSESSMENT — MIFFLIN-ST. JEOR: SCORE: 2427.5

## 2019-09-10 NOTE — ED NOTES
DATE:  9/9/2019   TIME OF RECEIPT FROM LAB:  1916  LAB TEST:  D-dimer  LAB VALUE:  >20.0  RESULTS GIVEN WITH READ-BACK TO (PROVIDER):  Sarah Gibson MD  TIME LAB VALUE REPORTED TO PROVIDER:   1916

## 2019-09-10 NOTE — CONSULTS
[facility], [place]  NeuroCritical Care Consult    Patient Name:  Shira Rodriguez  MRN:  [id number]    :  1960  Date of Admission:  2019  Date of Service:  September 10, 2019    Chief Complaint: PEA arrest 2/2 bilateral PE    HPI:  59 year old female who was admitted after a cardiac arrest. PMH of htn, hld, CYRUS, morbid obesity who presented to [facility] ER via EMS w/ respiratory distress. Sats in upper 80% upon arrival, SBP 90s. Ekg w/ new RBBB. Patient was intubated for airway protection and subsequently had HR slowing followed by PEA cardiac arrest. CPR was started immediately, epi x 3 + bicarb x 1 administered. Systemic tPA administered. Eventual ROSC after 24min.  Cardiac US revealed severe R-sided heart failure. Pulses lost again followed by CPR for 7 minutes before ROSC. CTA Chest w/ bilateral distal main and proximal lobar pulmonary emboli.  Admitted to ICU on pressors and vent. echo w/ Transferred to [facility] for ECMO + IR intervention. Underwent IR catheterization s/p suction thrombectomy of central R pulmonary thrombus, no significant removal from L pulmonary artery. S/p bilateral infusion catheter placement. Returned to the ICU, intubated, sedated, paralyzed.     Initial labs:  Cr 1.28  WBC 23.1, Hgb 16.7.  Trop 0.674  D-dimer >20  Lactic acid 8.6  Isat 7.17, pCO2 52, bicarb 19.        Family History   Problem Relation Age of Onset     Thyroid Disease Mother      Hypertension Mother      Skin Cancer Mother         MMohs surgery with skin graft     Cerebrovascular Disease Mother         CVA after endarderectomy     Diabetes Mother      Breast Cancer Paternal Grandmother      Pancreatic Cancer Paternal Grandmother      Cardiovascular Paternal Aunt      Cardiovascular Paternal Uncle      Depression Other      Alcoholism Father      Thyroid Disease Sister      Alcoholism Sister      Hypertension Maternal Grandmother      Heart Disease Sister         multiple ablations      Alcoholism Sister      Prostate Cancer Paternal Grandfather      Social History     Socioeconomic History     Marital status: Single     Spouse name: Not on file     Number of children: Not on file     Years of education: Not on file     Highest education level: Not on file   Occupational History     Occupation: registered nurse     Comment: [employer]   Social Needs     Financial resource strain: Not on file     Food insecurity:     Worry: Not on file     Inability: Not on file     Transportation needs:     Medical: Not on file     Non-medical: Not on file   Tobacco Use     Smoking status: Current Every Day Smoker     Packs/day: 1.00     Years: 33.00     Pack years: 33.00     Types: Cigarettes     Start date: 1982     Smokeless tobacco: Never Used     Tobacco comment: maybe   Substance and Sexual Activity     Alcohol use: Yes     Alcohol/week: 0.0 oz     Comment: occassional     Drug use: No     Sexual activity: Never       ROS:  10 point ROS neg other than the symptoms noted above in the HPI.      24 Hour Vital Signs Summary:  Temperatures:  Current -  ; Max - Temp  Av.4  F (35.8  C)  Min: 96.4  F (35.8  C)  Max: 96.4  F (35.8  C)  Respiration range: Resp  Av.6  Min: 10  Max: 131  Pulse range: Pulse  Av.4  Min: 86  Max: 146  Blood pressure range: Systolic (24hrs), Av , Min:90 , Max:201   ; Diastolic (24hrs), Av, Min:50, Max:128    Pulse oximetry range: SpO2  Av.2 %  Min: 61 %  Max: 100 %    Neurological Examination:  Patient heavily sedated with fentanyl, propofol and midazolam. Intubated and mechanically ventilated. Pupils equally reactive, symmetrical and midposition. Oculocephalic, gag, and cough reflexes present. Corneal reflex observed in left eye only. + blink to threat bilat. When noxious stimuli applied to right toes patient responded by slight movement of her right toes. Otherwise, no response to central or peripheral noxious stimulation.      Assessment    The patient  is a 59 year old female who was admitted after cardiac arrest and our service consulted for a neurological evaluation and prognostication following in-hospital PEA arrest 2/2 pulmonary embolism. We will defer prognostication until she is off ECMO and sedation.  Placement of EEG suggested to evaluate for sub-clinical status epilepticus or periodic epileptiform discharges while non-responsive. We will continue to follow her for encephalopathy presumed hypoxic brain injury of uncertain severity.      Recommendations:  - Neurological check q2h  - Monitor patient with EEG  - Lighten sedation as able to properly re-assess patient's neurological status      Patient seen and examined with my attending, Dr. Jennings, who agrees with assessment and recommendations as described above.    Brooklynn Wright, MS4  [employer]    Resident/Fellow Attestation   I, Rosario Gomez, was present with the medical student who participated in the service and in the documentation of the note.  I have verified the history and personally performed the physical exam and medical decision making.  I agree with the assessment and plan of care as documented in the note.        Rosario Gomez MD  PGY2  Date of Service (when I saw the patient): 09/10/19

## 2019-09-10 NOTE — PROCEDURES
[facility], [place]    Procedure: IR Procedure Note  Date/Time: 9/10/2019 3:25 AM  Performed by: Jac Fields MD  Authorized by: Jac Fields MD   IR Fellow Physician: Jac Fields MD  Other(s) attending procedure: Afia Levy MD    UNIVERSAL PROTOCOL   Site Marked: Yes  Prior Images Obtained and Reviewed:  Yes  Required items: Required blood products, implants, devices and special equipment available    Patient identity confirmed:  Verbally with patient  Patient was reevaluated immediately before administering moderate or deep sedation or anesthesia  Confirmation Checklist:  Patient's identity using two indicators, relevant allergies, procedure was appropriate and matched the consent or emergent situation and correct equipment/implants were available  Time out: Immediately prior to the procedure a time out was called    Preparation: Patient was prepped and draped in usual sterile fashion      SEDATION    : On ICU sedation.    Fluoroscopy Time: 31 minute(s)  See dictated procedure note for full details.  Findings: Right pulmonary artery near occlusive thrombus at the bifurcation with near occlusive thrombus of the right upper lobe, lobar and segment thrombus of the right lower lobe. Distal left main pulmnary artery non-occlusive thrombus with non-occlusive scattered thrombus of the lower lobe and upper lobe segmental pulmonary arteries.     Specimens: none    Complications: None    Condition: Stable    Plan: 1. Return to ICU  2. 1 mg/hr tPA in each perfusion catheter for 12 hours.   3. 500 U/hr Heparin through right CFV sheath  4. Return to IR tomorrow for post-lytics check/repeat pulmonary angiogram.    PROCEDURE   Patient Tolerance:  Patient tolerated the procedure well with no immediate complications  Describe Procedure: 1. Suction thrombectomy of the central right pulmonary thrombus. No significant thrombus removed of the central left pulmonary arteries  2.  Bilateral infusion catheter placement  Time of Sedation in Minutes by Physician:  0 (no IR administered sedation)

## 2019-09-10 NOTE — PROGRESS NOTES
ECMO Shift Summary:    Patient remains on V/AECMO, all equipment is functioning and alarms are appropriately set. RPM's 3650 with flow range 4.1 L/min. Sweep gas is at 6 LPM and FiO2 70%. Circuit remains free of air, clot and fibrin. Cannulas are secure with no bleeding from site. Extremities are cool. Suctioned ETT for a large amount of bloody secretions.    Significant Shift Events: Pt cannulated in heart cath lab, then transported to IR for angiogram, thrombectomy and thrombolytic therapy to the pulmonary arteries. Pt transported to CT then up to 4E arriving at 0430.    Vent settings:  Ventilation Mode: CMV/AC  (Continuous Mandatory Ventilation/ Assist Control)  FiO2 (%): 60 %  Rate Set (breaths/minute): 12 breaths/min  Tidal Volume Set (mL): 450 mL  PEEP (cm H2O): 132 cmH2O  Oxygen Concentration (%): 45 %  Peak Inspiratory Pressure (cm H2O) (Drager Jannet): 32  Resp: 12  .    Heparin is running at a straight rate 500U/hr through sheath in right femoral vein per IR therapy. 1800u/hr stopped. ACT range 180-200.    No blood loss and no product given.       Intake/Output Summary (Last 24 hours) at 9/10/2019 0644  Last data filed at 9/10/2019 0630  Gross per 24 hour   Intake 1441.12 ml   Output 425 ml   Net 1016.12 ml       ECHO:  No results found for this or any previous visit.  Results for orders placed in visit on 18   Echocardiogram Complete    Narrative [id number]  [id number]  [id number]  [id number]^CARRINGTON^VINOD^WILLIS           [facility]  Diagnostic and Treamtent-3rd Floor  [address] [place] [id number]     Name: XAVIER ROWELL  MRN: [id number]  : 1960  Study Date: 2018 05:59 PM  Age: 58 yrs  Gender: Female  Patient Location: [facility] [place]  Reason For Study: Cough, Edema extremities  Ordering Physician: VINOD SHULTZ  Referring Physician: VINOD SHULTZ  Performed By: Ryley Ridley RDCS     BSA: 2.7 m2  Height: 66 in  Weight: 418 lb  HR:  85  BP: 148/77 mmHg  _____________________________________________________________________________  __        Procedure  Echocardiogram with two-dimensional, color and spectral Doppler performed.  _____________________________________________________________________________  __        Interpretation Summary  Moderate concentric wall thickening consistent with left ventricular  hypertrophy is present.  Biventricular size and systolic function is normal.The LVEF is 55-60%.  No significant valve disease.  Inferior vena cava appers dilated measuring 2.4 cm  _____________________________________________________________________________  __        Left Ventricle  Left ventricular size is normal. Left ventricular function is normal.The EF is  55-60%. Moderate concentric wall thickening consistent with left ventricular  hypertrophy is present. Left ventricular diastolic function is indeterminate.  Regional wall motion is probably normal. Endocardial border definition  suboptimal and contrast not given.     Right Ventricle  The right ventricle is normal size. Global right ventricular function is  normal.     Atria  Both atria appear normal.     Mitral Valve  Mild mitral annular calcification is present. Trace mitral insufficiency is  present.        Aortic Valve  Mild aortic valve sclerosis is present. No aortic regurgitation is present.  Transvalvular Doppler is normal and without signs of signifcant stenosis.     Tricuspid Valve  The peak velocity of the tricuspid regurgitant jet is not obtainable.  Pulmonary artery systolic pressure cannot be assessed.     Pulmonic Valve  The pulmonic valve cannot be assessed.     Vessels  The aorta root is normal. Inferior vena cava appers dilated measuring 2.4 cm.  Ascending aorta 3.3 cm.     Pericardium  No pericardial effusion is present.     _____________________________________________________________________________  __  MMode/2D Measurements & Calculations  IVSd: 1.5 cm  LVIDd: 5.4  cm  LVIDs: 3.7 cm  LVPWd: 1.3 cm  FS: 30.5 %  LV mass(C)d: 323.5 grams  LV mass(C)dI: 118.2 grams/m2     asc Aorta Diam: 3.3 cm  LVOT diam: 2.2 cm  LVOT area: 4.0 cm2  LA Volume (BP): 71.6 ml  LA Volume Index (BP): 26.1 ml/m2  RWT: 0.47        Doppler Measurements & Calculations  MV E max emile: 67.4 cm/sec  MV A max emile: 88.8 cm/sec  MV E/A: 0.76  MV dec time: 0.17 sec  Ao V2 max: 203.2 cm/sec  Ao max P.0 mmHg  MERON(V,D): 2.3 cm2     LV V1 max P.5 mmHg  LV V1 max: 116.8 cm/sec  AV Emile Ratio (DI): 0.57  E/E' av.0  Lateral E/e': 13.4  Medial E/e': 12.6     _____________________________________________________________________________  __           Report approved by: Tariq Pappas 2018 10:50 AM          CXR:  Recent Results (from the past 24 hour(s))   XR Chest Port 1 View    Narrative    XR CHEST PORT 1   2019 5:00 PM     HISTORY:  SOB    COMPARISON: Film dated 2018      Impression    IMPRESSION: EKG leads are seen over the thorax. There is increased  opacity in the left retrocardiac region. This could be due to pleural  fluid and associated infiltrate or atelectasis. The right lung appears  clear.    MARK HERNANDEZ MD   XR Chest Port 1 View    Narrative    XR CHEST PORT 1   2019 6:17 PM     HISTORY:  SOB    COMPARISON: Film dated 2019    FINDINGS:  ET tube has been inserted. The iain is difficult to  identify on this view. The tip of the ET tube is probably just above  the iain. It could be pulled back. There is slightly increased  opacity in the left retrocardiac region compatible with infiltrate or  atelectasis. The right lung remains relatively clear.      Impression    IMPRESSION: ET tube tip in place. The exact position of the tip of the  ET tube with respect to the iain is difficult to determine on this  view. The tip lies at least 3 cm below the level of the clavicular  heads.. It could be pulled back.    MARK HRENANDEZ MD   CT Chest Pulmonary Embolism w Contrast    Result Value    Radiologist flags Pulmonary embolism and malpositioning of the (AA)    Narrative    CT CHEST PULMONARY EMBOLISM WITH CONTRAST   9/9/2019 6:51 PM    HISTORY: Shortness of breath.    TECHNIQUE: Pulmonary embolism protocol was performed. 83mL Isovue-370  were injected IV. After contrast injection, volumetric helical  acquisition was performed from the thoracic inlet through the upper  abdomen. Radiation dose for this scan was reduced using automated  exposure control, adjustment of the mA and/or kV according to patient  size, or iterative reconstruction technique.    COMPARISON: Chest CT performed 6/1/2011.    FINDINGS: There are extensive occlusive and nonocclusive pulmonary  emboli within the second and third order pulmonary artery branches  bilaterally. Prominence of the right heart suggests some degree of  right heart strain associated with the bilateral pulmonary embolism.  The thoracic aorta is of normal caliber, without evidence for thoracic  aortic aneurysm or dissection. Mild atherosclerotic calcification of  the thoracic aorta and coronary arteries. Small left pleural effusion.  No pericardial or right pleural effusion. An endotracheal tube is in  place, with tip 0.4 cm above the iain. This should be pulled back.  Patchy consolidation and infiltrate is noted in both lower lungs, most  likely representing atelectasis. Limited views of the upper abdomen  are unremarkable. Degenerative changes are noted throughout the  thoracic spine.      Impression    IMPRESSION:   1. There is extensive occlusive and nonocclusive pulmonary embolism  bilaterally.  2. Prominence of the right heart suggests some degree of right heart  strain associated with the pulmonary embolism.  3. Endotracheal tube is in place, with tip 0.4 cm above the iain.  This should be pulled back approximately 2 cm for more optimal  positioning.  4. Small left pleural effusion.  5. Patchy consolidation and infiltrate in both lungs  is nonspecific,  but may be related to atelectasis.     [Critical Result: Pulmonary embolism and malpositioning of the  endotracheal tube]    Finding was identified on 9/9/2019 6:53 PM.     Dr. Gibson was contacted by me on 9/9/2019 7:02 PM and verbalized  understanding of the critical result.      ANALISA SEGOVIA MD   IR Pulmonary Angiogram Bilateral    Narrative    Procedures 9/10/2019:  1. US guidance for venous access  2. Bilateral pulmonary angiogram.  3. Bilateral pulmonary artery suction thrombectomy.  4. Bilateral completion pulmonary angiogram.   5. Selective catheterization of the segmental/subsegmental pulmonary  arteries.  6. Placement bilateral infusion catheters in the  segmental/subsegmental pulmonary arteries.    History: 58 yo F with a history of HTN, HL, CYRUS, morbid obesity  transfer from [facility] for massive pulmonary embolism. Acute  intervention indicated. Patient  transferred for ECMO  support/admission. Plan for suction mechanical thrombectomy and  infusion catheter placement.      Comparison: 9/9/2019 CTA Chest.    Staff: Afia Levy MD    Fellow/Resident: Jac Fields M.D.    Monitoring: Patient was continued on pre-existing sedation. Patient  monitored by IR nursing with IR staff supervision. Respiratory therapy  present to manage ventilator. Patient remained stable throughout the  procedure.     Medications: sodium bicarbonate 8.4 % injection 100 mEq    Fluoroscopy time: 30.5 minutes    Findings/procedure:    Prior to the procedure, both verbal and written informed consent  obtained from the patient's sister, Kirsten Gould.     Patient presented with existing right 6 Fr vascular sheath. An Amplatz  stiff 0.035 guidewire was advanced into the abdominal aorta.  Over the  guidewire, tract was serially dilated to 20 Persian. Sheath was  exchanged for a 22 Persian GORE DrySeal Flex introducer sheath. Through  the sheath, a 5 Fr angled pigtail catheter was advanced through the  heart  into the main pulmonary artery. Pulmonary angiogram obtained.     PULMONARY ANGIOGRAM: Large near-occlusive distal right main filling  defect extending into the proximal lobar pulmonary arteries, nearly  occlusive in the right upper lobe. Prominent filling defect of the  distal interlobar artery. Scattered segment and subsegmental filling  defects. Distal left main pulmonary artery thrombus filling defect  extending into the left midlung and lower lung lobar pulmonary  arteries. Occlusive thrombus of the left lower lobar pulmonary artery.    Stiff Amplatz guidewire exchanged for a glide wire. Over the Glidewire  the angled pigtail catheter was directed into the proximal right  pulmonary artery. Additional pulmonary angiographic images were  obtained. Glidewire and 5 Canadian Vert catheter used to select the  right distal interlobar pulmonary artery. Angiographic images were  obtained finding thrombus of the distal interlobar proximal lower  lobar pulmonary arteries. Guidewire exchange first if Amplatz  guidewire. The INARI FlowTriever aspiration catheter was advanced over  the wire into the right pulmonary artery with the tip in the mid  interlobar artery. Multiple aspiration passes were made through the  interlobar artery with significant thrombus aspirated. Aspiration  catheter retracted into the distal right main pulmonary artery and  origin of the right upper lobar pulmonary artery with multiple  additional passes yielding significant thrombus. Completion  angiography demonstrates resolution of the large central right  pulmonary artery filling defects.    Suction catheter was retracted. Vert catheter and Glidewire used to  select the left lower lobar pulmonary artery. Glidewire exchanged for  a stiff Amplatz guidewire. Aspiration catheter was advanced to the  left main pulmonary artery take off. Left pulmonary artery angiography  obtained through the aspiration catheter. Multiple aspiration attempts  through the  left lower lobar and distal left main pulmonary artery did  not yield significant thrombus. Over a guidewire, both the medium and  large self expanding nitinol mesh discs are introduced into the left  lower lobar pulmonary artery and attempts to improve thrombus  retrieval. No significant additional thrombus was obtained. Completion  angiography demonstrated residual thrombus of the distal left main  pulmonary artery and occlusive thrombus of the left lower lobe.    Stasis is noted in the patient in the pulmonary arteries. The  patient's pressors were resumed and pulsatility improved. The patient  was administered IV bicarbonate.    Through the 22 Danish right groin sheath, a guidewire was advanced  into the right lower lobe segmental pulmonary arteries. 5 Fr UniFuse  90 cm/10 cm infusion length infusion catheter was advanced with the  infusion wire tip positioned in a right lower lobe segmental pulmonary  artery, spanning the interlobar pulmonary artery with proximal  sideholes in the distal main pulmonary artery.    Through the 22 Danish) sheath, A guidewire was advanced into the left  lower lobe segmental pulmonary artery. 5 Fr UniFuse 90 cm/10 cm  infusion length infusion catheter was advanced with the infusion wire  tip positioned in a left lower lobe segmental pulmonary artery,  spanning the dominant left lower lobar occlusive thrombus in distal  main left pulmonary artery thrombus with proximal sidehole in the  proximal left main pulmonary artery.     A final image was saved. The right common femoral vein sheath was  sutured in place with 2-0 retention sutures.    1.0 mg/h TPA infusion was initiated within each of the infusion  catheters. Infusion of 500 units per hour of heparin was initiated in  the right groin sheath. Patient transferred in stable position to the  ICU. No immediate complication.    Estimate blood loss: 690 cc    Specimens: None.      Impression    Impression:  1. Pulmonary angiography  demonstrating large central distal right  main, interlobar and proximal right upper lobe thrombus. Moderate to  large distal left main and occlusive left lower lobar pulmonary artery  thrombus.  2. Successful suction thrombectomy of the right pulmonary artery with  thrombectomy of the large central right pulmonary artery thrombus.   3. No significant clot burden removed from the left pulmonary artery.  4. Bilateral infusion catheter placement.    Plan:  1. Bilateral infusion catheters with tPA infusion (1 mg/hr in each  catheter) for 12 hours total. Run saline at same volume once tPA is  completed. 500 U/hr heparin through the right groin sheath.   2. Plan for thrombolytics check/repeat pulmonary angiogram following  tPA infusion tomorrow.      XR Chest Port 1 View    Narrative    EXAM: XR CHEST PORT 1 VW  9/10/2019 6:25 AM      HISTORY: Check endotracheal tube placement and ECLS cannula placement.  DO NOT log-roll patient.  Place film under patient using patient  safety handling process.    COMPARISON: CT 9/10/2019, radiograph 9/9/2019    FINDINGS: Supine AP radiograph of the chest. Endotracheal tube tip  projects 3.1 cm from the iain. Right IJ central line tip projects  over the mid SVC. Inferior approach ECMO cannulation tip projects over  the inferior cavoatrial junction. Right and left pulmonary arterial  lines. The trachea is midline. The cardiac silhouette is enlarged.  Small left pleural effusion with dense left basilar opacity, as seen  on previous CT. Patchy right basilar opacities. Upper abdomen is  unremarkable. Known anterior rib fractures are not well demonstrated  on the study.       Impression    IMPRESSION:   1. Endotracheal tube tip projects 3.1 cm from the iain.  2. Small left pleural effusion with dense left basilar  atelectasis/consolidation and patchy right basilar opacities similar  to prior earlier CT.         Labs:  Recent Labs   Lab 09/10/19  0611 09/10/19  0440 09/10/19  0233  09/09/19  2349   PH 7.39 7.35 7.31* 7.22*  7.18*   PCO2 33* 27* 30* 37  37   PO2 73* 87 236* 131*  119*   HCO3 20* 15* 15* 15*  14*   O2PER 45 45 50  --        Lab Results   Component Value Date    HGB 13.9 09/10/2019    PHGB 150 (H) 09/10/2019     09/10/2019    FIBR <61 (LL) 09/10/2019    INR 2.32 (H) 09/10/2019     (HH) 09/10/2019    DD >20.0 (H) 09/10/2019    AXA 0.50 09/10/2019         Plan is go back to IR later today after ATP dose completed.      Earl Faulkner, RT, RRT  9/10/2019 6:44 AM

## 2019-09-10 NOTE — PROGRESS NOTES
Patient transferred from the ED to ICU intubated. Patient was initially on CMV settings, but settings were switched to SIMV due to asynchrony.  Ventilation Mode: SIMV/PS  (Synchronized Intermittent Mandatory Ventilation with Pressure Support)  FiO2 (%): 70 %  Rate Set (breaths/minute): 20 breaths/min  Tidal Volume Set (mL): 500 mL  PEEP (cm H2O): 5 cmH2O  Oxygen Concentration (%): 80 %  Peak Inspiratory Pressure (cm H2O) (Drager Jannet): 32  Resp: 16  FiO2 weaned down to 70% with SpO2 in the mid 90's.  Patient was switched over to EMS vent to be transferred to the [facility].  9/9/2019  Kylie Whitmore RRT

## 2019-09-10 NOTE — PROGRESS NOTES
Cardiology Progress Note    Assessment & Plan      59 year old female with a history of hypertension, hyperlipidemia, sleep apnea, morbid obesity, and chronic bronchitis who presents to the emergency department via EMS for evaluation of respiratory distress s/p intubation and PEA arrest with CTA consistent with bilateral PE transferred here for [facility] thrombectomy and catheter directed lytics on 9/9.    Plan for today  - switch versed for propofol  - finish 12h of directed lytics  - systemic heparin for ECMO      Neurology: Intubated, sedated  --continue versed, fentanyl propofol in favor of fentanyl ordered triglycerides   - initial CTH no acute changes   Cardiovascular / Hemodynamics: PEA arrest secondary to massive PE.   CTA-PE study demonstrates bilateral distal main and proximal lobar pulmonary emboli, greater on the right with elevated RV/LV ratio. Peripheral V-A ECMO inserted for hemodynamic support for massive PE. Went to cath lab and then to IR for suction mechanical thrombectomy.  LA 8.6   TTE: dilated RV, severely reduced.   EKG: SR  --wean pressors/inotropes as able  --ACT goal 160-180  --hold lipitor for now given likely hepatic injury during arrest  --hold ACE/ARB for now given likely reduced renal fxn after arrest  --holding beta blocker given shock   - on 0.05NE and 2 vaso      Pulmonary: PEA arrest secondary to massive PE. See above.   Vent Settings:  ETT in appropriate place.  Now weaning vent requirements.  CXR: Lines in stable position.   --wean vent as able  --daily CXR  - s/p thrombectomy and catheter directed lytics 9/9-9/10  Chest CTSubpleural groundglass attenuation in the right lung, possibly  pulmonary infarcts given previously seen extensive pulmonary emboli.   Patchy nodularity in the right base suggesting aspiration.  --Q2h ABGs for now  --consider scheduled duonebs if signs of lung dz, currently PRN     GI and Nutrition: No known medical hx.    --monitor BID LFTs  - nutrition consult    --GI Prophylaxis: PPI    Renal, Fluid and Electrolytes: Cr 1.7 up from 1.2 UOP continue to monitor.  --monitor urine output  --maintain K>3 and Mg>2    Infectious Disease: No signs of infection. Leukocytosis c/w arrest. Blood cultures collected.   --vancomycin/zosyn x5 days for ECMO  --daily blood cultures  --monitor for signs of infection given cooling, lines, and leukocytosis   Hematology and Oncology: Receiving heparin for ECMO and ASA/ticagrelor for KARLA.   --cryo PRN fibrinogen < 200; FFP for INR >2  --Transfuse for Hgb<10  --heparin gtt for ECMO with ACT goal 160-180  --US LE w/ arterial duplex per ECMO protocol   --DVT PPX: Heparin as above   Endocrinology: No known medical history. BG elevated.  --insulin gtt  --stress dose steroids due to persistent hypotension -    Lines: R femoral arterial and venous ECMO cannulae September 9, 2019  L femoral arterial line September 9, 2019  R radial arterial line September 9, 2019  ETT September 9, 2019  Underwood catheter September 9, 2019  OG tube September 9, 2019  Restraint: needed    Current lines are required for patient management       Family update by me today: Yes      Code Status:     The pt was discussed and evaluated with Dr. Mcfarlane, Cardiologist, attending physician, who agrees with the assessment and plan above.          Elvira Chilel    Interval History   Afebrile HR    Acidotic with several doses of bicarb   B/l tPA in bilateral pa's   Net positive 1.6L  Ventilation Mode: CMV/AC  (Continuous Mandatory Ventilation/ Assist Control)  FiO2 (%): 45 %  Rate Set (breaths/minute): 12 breaths/min  Tidal Volume Set (mL): 450 mL  PEEP (cm H2O): 12 cmH2O  Oxygen Concentration (%): 45 %  Peak Inspiratory Pressure (cm H2O) (Drager Jannet): 32  Resp: 12        Physical Exam   Temp: 98.4  F (36.9  C) Temp src: Esophageal   Pulse: 86 Heart Rate: 99 Resp: 12 SpO2: 100 % O2 Device: Mechanical Ventilator    Vitals:    09/10/19 0600   Weight: (!) 183.1 kg (403  lb 10.6 oz)     Vital Signs with Ranges  Temp:  [96.4  F (35.8  C)-98.4  F (36.9  C)] 98.4  F (36.9  C)  Pulse:  [] 86  Heart Rate:  [] 99  Resp:  [] 12  BP: ()/() 119/77  MAP:  [62 mmHg-226 mmHg] 79 mmHg  Arterial Line BP: ()/(2-275) 88/75  FiO2 (%):  [40 %-100 %] 45 %  SpO2:  [61 %-100 %] 100 %  I/O last 3 completed shifts:  In: 1450.04 [I.V.:1450.04]  Out: 425 [Urine:425]    Heart Rate: 99, Pulse 86, temperature 98.4  F (36.9  C), resp. rate 12, weight (!) 183.1 kg (403 lb 10.6 oz), SpO2 100 %, not currently breastfeeding.  403 lbs 10.6 oz  GEN:  Morbidly obese sedated and intubated  CV:  Distant heart sounds   LUNGS:  Decreased breath sounds  ABD:  Active bowel sounds, soft, non-tender/non-distended.  No rebound/guarding/rigidity.  EXT:  No edema or cyanosis.  ECMO canula in place with only scant oozing   Underwood in place    Medications     alteplase (ACTIVASE) 5 mg/500 mL infusion (LINE 1) 1 mg/hr (09/10/19 1000)     alteplase (ACTIVASE) 5 mg/500 mL infusion (LINE 2) 1 mg/hr (09/10/19 1000)     cisatracurium (NIMBEX) infusion ADULT       IV fluid REPLACEMENT ONLY       EPINEPHrine IV infusion ADULT Stopped (09/10/19 0625)     fentaNYL 75 mcg/hr (09/10/19 1000)     heparin 2 unit/mL in 0.9% NaCl 3 mL/hr (09/10/19 1000)     heparin 700 Units/hr (09/10/19 1000)     insulin (regular) 2 Units/hr (09/10/19 1015)     midazolam 8 mg/hr (09/10/19 1000)     nitroPRUsside (NIPRIDE) IV infusion ADULT/PEDS GREATER than or EQUAL to 45 kg std conc       - MEDICATION INSTRUCTIONS -       norepinephrine 0.03 mcg/kg/min (09/10/19 1000)     - MEDICATION INSTRUCTIONS -       phenylephrine       propofol (DIPRIVAN) infusion Stopped (09/10/19 0500)     sodium chloride 75 mL/hr at 09/10/19 0800     vasopressin (PITRESSIN) infusion ADULT (40 mL) 1 Units/hr (09/10/19 1000)       albuterol  2.5 mg Nebulization Q4H     artificial tears   Both Eyes Q8H     pantoprazole (PROTONIX) IV  40 mg Intravenous  Daily     piperacillin-tazobactam  3.375 g Intravenous Q6H     sodium bicarbonate  100 mEq Intravenous Once     sodium chloride (PF)  3 mL Intracatheter Q8H     vancomycin place zelaya - receiving intermittent dosing  1 each Intravenous See Admin Instructions       Data   Recent Labs   Lab 09/10/19  1018 09/10/19  1011 09/10/19  0440 09/10/19  0235  09/09/19  1652   WBC  --  23.4* 32.9* 34.4*  --  23.1*   HGB  --  13.2 13.9 14.5   < > 16.7*   MCV  --  90 94 96  --  95   PLT  --  156 178 189  --  263   INR  --  1.79* 2.32* 2.73*  --  1.23*   NA  --  146* 141 140   < > 137   POTASSIUM  --  3.3* 3.4 3.5   < > 4.6   CHLORIDE  --  112* 109 108  --  101   CO2  --  23 14* 14*  --  21   BUN  --  28 26 25  --  17   CR  --  1.67* 1.77* 1.76*  --  1.28*   ANIONGAP  --  11 19* 18*  --  15*   MARIA INES  --  8.2* 7.7* 7.9*  --  10.1   * 129* 255*  235* 244*   < > 258*   ALBUMIN  --  2.1* 2.2* 2.4*  --  3.4   PROTTOTAL  --  5.4* 5.5* 6.0*  --  7.6   BILITOTAL  --  0.6 0.9 0.9  --  0.5   ALKPHOS  --  64 73 80  --  82   ALT  --  173* 194* 208*  --  41   AST  --  212* Canceled, Test credited 346*  --  39   TROPI  --   --  1.467* 1.714*  --  0.674*    < > = values in this interval not displayed.       Recent Results (from the past 24 hour(s))   XR Chest Port 1 View    Narrative    XR CHEST PORT 1 VW  9/9/2019 5:00 PM     HISTORY:  SOB    COMPARISON: Film dated 6/4/2018      Impression    IMPRESSION: EKG leads are seen over the thorax. There is increased  opacity in the left retrocardiac region. This could be due to pleural  fluid and associated infiltrate or atelectasis. The right lung appears  clear.    MARK HERNANDEZ MD   XR Chest Port 1 View    Narrative    XR CHEST PORT 1 VW  9/9/2019 6:17 PM     HISTORY:  SOB    COMPARISON: Film dated 9/9/2019    FINDINGS:  ET tube has been inserted. The iain is difficult to  identify on this view. The tip of the ET tube is probably just above  the iain. It could be pulled back. There is  slightly increased  opacity in the left retrocardiac region compatible with infiltrate or  atelectasis. The right lung remains relatively clear.      Impression    IMPRESSION: ET tube tip in place. The exact position of the tip of the  ET tube with respect to the iain is difficult to determine on this  view. The tip lies at least 3 cm below the level of the clavicular  heads.. It could be pulled back.    MARK HERNANDEZ MD   CT Chest Pulmonary Embolism w Contrast   Result Value    Radiologist flags Pulmonary embolism and malpositioning of the (AA)    Narrative    CT CHEST PULMONARY EMBOLISM WITH CONTRAST   9/9/2019 6:51 PM    HISTORY: Shortness of breath.    TECHNIQUE: Pulmonary embolism protocol was performed. 83mL Isovue-370  were injected IV. After contrast injection, volumetric helical  acquisition was performed from the thoracic inlet through the upper  abdomen. Radiation dose for this scan was reduced using automated  exposure control, adjustment of the mA and/or kV according to patient  size, or iterative reconstruction technique.    COMPARISON: Chest CT performed 6/1/2011.    FINDINGS: There are extensive occlusive and nonocclusive pulmonary  emboli within the second and third order pulmonary artery branches  bilaterally. Prominence of the right heart suggests some degree of  right heart strain associated with the bilateral pulmonary embolism.  The thoracic aorta is of normal caliber, without evidence for thoracic  aortic aneurysm or dissection. Mild atherosclerotic calcification of  the thoracic aorta and coronary arteries. Small left pleural effusion.  No pericardial or right pleural effusion. An endotracheal tube is in  place, with tip 0.4 cm above the iain. This should be pulled back.  Patchy consolidation and infiltrate is noted in both lower lungs, most  likely representing atelectasis. Limited views of the upper abdomen  are unremarkable. Degenerative changes are noted throughout the  thoracic spine.       Impression    IMPRESSION:   1. There is extensive occlusive and nonocclusive pulmonary embolism  bilaterally.  2. Prominence of the right heart suggests some degree of right heart  strain associated with the pulmonary embolism.  3. Endotracheal tube is in place, with tip 0.4 cm above the iain.  This should be pulled back approximately 2 cm for more optimal  positioning.  4. Small left pleural effusion.  5. Patchy consolidation and infiltrate in both lungs is nonspecific,  but may be related to atelectasis.     [Critical Result: Pulmonary embolism and malpositioning of the  endotracheal tube]    Finding was identified on 9/9/2019 6:53 PM.     Dr. Gibson was contacted by me on 9/9/2019 7:02 PM and verbalized  understanding of the critical result.      ANLAISA SEGOVIA MD   IR Pulmonary Angiogram Bilateral    Narrative    Procedures 9/10/2019:  1. US guidance for venous access  2. Bilateral pulmonary angiogram.  3. Bilateral pulmonary artery suction thrombectomy.  4. Bilateral completion pulmonary angiogram.   5. Selective catheterization of the segmental/subsegmental pulmonary  arteries.  6. Placement bilateral infusion catheters in the  segmental/subsegmental pulmonary arteries.    History: 60 yo F with a history of HTN, HL, CYRUS, morbid obesity  transfer from [facility] for massive pulmonary embolism. Acute  intervention indicated. Patient  transferred for ECMO  support/admission. Plan for suction mechanical thrombectomy and  infusion catheter placement.      Comparison: 9/9/2019 CTA Chest.    Staff: Afia Levy MD    Fellow/Resident: Jac Fields M.D.    Monitoring: Patient was continued on pre-existing sedation. Patient  monitored by IR nursing with IR staff supervision. Respiratory therapy  present to manage ventilator. Patient remained stable throughout the  procedure.     Medications: sodium bicarbonate 8.4 % injection 100 mEq    Fluoroscopy time: 30.5 minutes    Findings/procedure:    Prior to the  procedure, both verbal and written informed consent  obtained from the patient's sister, Kirsten Gould.     Patient presented with existing right 6 Fr vascular sheath. An Amplatz  stiff 0.035 guidewire was advanced into the abdominal aorta.  Over the  guidewire, tract was serially dilated to 20 Liberian. Sheath was  exchanged for a 22 Liberian GORE DrySeal Flex introducer sheath. Through  the sheath, a 5 Fr angled pigtail catheter was advanced through the  heart into the main pulmonary artery. Pulmonary angiogram obtained.     PULMONARY ANGIOGRAM: Large near-occlusive distal right main filling  defect extending into the proximal lobar pulmonary arteries, nearly  occlusive in the right upper lobe. Prominent filling defect of the  distal interlobar artery. Scattered segment and subsegmental filling  defects. Distal left main pulmonary artery thrombus filling defect  extending into the left midlung and lower lung lobar pulmonary  arteries. Occlusive thrombus of the left lower lobar pulmonary artery.    Stiff Amplatz guidewire exchanged for a glide wire. Over the Glidewire  the angled pigtail catheter was directed into the proximal right  pulmonary artery. Additional pulmonary angiographic images were  obtained. Glidewire and 5 Liberian Vert catheter used to select the  right distal interlobar pulmonary artery. Angiographic images were  obtained finding thrombus of the distal interlobar proximal lower  lobar pulmonary arteries. Guidewire exchange first if Amplatz  guidewire. The INARI FlowTriever aspiration catheter was advanced over  the wire into the right pulmonary artery with the tip in the mid  interlobar artery. Multiple aspiration passes were made through the  interlobar artery with significant thrombus aspirated. Aspiration  catheter retracted into the distal right main pulmonary artery and  origin of the right upper lobar pulmonary artery with multiple  additional passes yielding significant thrombus.  Completion  angiography demonstrates resolution of the large central right  pulmonary artery filling defects.    Suction catheter was retracted. Vert catheter and Glidewire used to  select the left lower lobar pulmonary artery. Glidewire exchanged for  a stiff Amplatz guidewire. Aspiration catheter was advanced to the  left main pulmonary artery take off. Left pulmonary artery angiography  obtained through the aspiration catheter. Multiple aspiration attempts  through the left lower lobar and distal left main pulmonary artery did  not yield significant thrombus. Over a guidewire, both the medium and  large self expanding nitinol mesh discs are introduced into the left  lower lobar pulmonary artery and attempts to improve thrombus  retrieval. No significant additional thrombus was obtained. Completion  angiography demonstrated residual thrombus of the distal left main  pulmonary artery and occlusive thrombus of the left lower lobe.    Stasis is noted in the patient in the pulmonary arteries. The  patient's pressors were resumed and pulsatility improved. The patient  was administered IV bicarbonate.    Through the 22 Indonesian right groin sheath, a guidewire was advanced  into the right lower lobe segmental pulmonary arteries. 5 Fr UniFuse  90 cm/10 cm infusion length infusion catheter was advanced with the  infusion wire tip positioned in a right lower lobe segmental pulmonary  artery, spanning the interlobar pulmonary artery with proximal  sideholes in the distal main pulmonary artery.    Through the 22 Indonesian) sheath, A guidewire was advanced into the left  lower lobe segmental pulmonary artery. 5 Fr UniFuse 90 cm/10 cm  infusion length infusion catheter was advanced with the infusion wire  tip positioned in a left lower lobe segmental pulmonary artery,  spanning the dominant left lower lobar occlusive thrombus in distal  main left pulmonary artery thrombus with proximal sidehole in the  proximal left main pulmonary  artery.     A final image was saved. The right common femoral vein sheath was  sutured in place with 2-0 retention sutures.    1.0 mg/h TPA infusion was initiated within each of the infusion  catheters. Infusion of 500 units per hour of heparin was initiated in  the right groin sheath. Patient transferred in stable position to the  ICU. No immediate complication.    Estimate blood loss: 690 cc    Specimens: None.      Impression    Impression:  1. Pulmonary angiography demonstrating large central distal right  main, interlobar and proximal right upper lobe thrombus. Moderate to  large distal left main and occlusive left lower lobar pulmonary artery  thrombus.  2. Successful suction thrombectomy of the right pulmonary artery with  thrombectomy of the large central right pulmonary artery thrombus.   3. No significant clot burden removed from the left pulmonary artery.  4. Bilateral infusion catheter placement.    Plan:  1. Bilateral infusion catheters with tPA infusion (1 mg/hr in each  catheter) for 12 hours total. Run saline at same volume once tPA is  completed. 500 U/hr heparin through the right groin sheath.   2. Plan for thrombolytics check/repeat pulmonary angiogram following  tPA infusion tomorrow.      CT Head w/o Contrast    Narrative    CT HEAD W/O CONTRAST 9/10/2019 4:20 AM    Provided History: ecmo    Comparison: None available.    Technique: Using multidetector thin collimation helical acquisition  technique, axial, coronal and sagittal CT images from the skull base  to the vertex were obtained without intravenous contrast.     Findings:    No intracranial hemorrhage, mass effect, or midline shift. The  ventricles are proportionate to the cerebral sulci. The gray to white  matter differentiation of the cerebral hemispheres is preserved. The  basal cisterns are patent. Hyperattenuation of the intracranial major  arteries is presumably secondary to contrast from recent CT angiogram.    The visualized  paranasal sinuses are clear. The mastoid air cells are  clear.       Impression    Impression: No acute intracranial pathology. No definite hypoxic  ischemic injury.    I have personally reviewed the examination and initial interpretation  and I agree with the findings.    MADIHA ROLDAN MD   CT Chest Abdomen Pelvis w/o Contrast    Narrative    EXAMINATION: CT CHEST ABDOMEN PELVIS W/O CONTRAST, 9/10/2019 4:21 AM    TECHNIQUE:  Helical CT images of the chest, abdomen, and pelvis were  obtained without IV contrast.    COMPARISON: CT chest 9/9/2019, CT abdomen/pelvis 4/12/2017.    PROVIDED HISTORY: ecmo.    FINDINGS:  CHEST: Patient is intubated. Eccentric or bronchial tree is patent.  Small left pleural effusion with associated compressive atelectasis.  Dependent atelectasis in the right lung. Patchy subpleural groundglass  opacities in the right lung (example series 3, image 65). Nodular  opacities in the right base with prominence of the interlobular septum  (series 3, image 88).    Heart size is within normal limits. Pericardial lipomatosis. Small  pericardial effusion. ECMO cannulation tip terminates at the superior  cavoatrial junction. Additional perfusion catheter tips extending into  the distal right and left pulmonary arteries and into the lower lobar  arteries. No mediastinal adenopathy. Visualized thyroid is  unremarkable.     ABDOMEN/PELVIS: The liver, pancreas, and spleen are unremarkable.  Cholelithiasis. No gallbladder wall thickening or pericholecystic  edema. Unchanged 3.1 cm right adrenal nodule containing macroscopic  fat suggesting a myelolipoma or other benign nodule. Left adrenal  glands are unremarkable. Apparent enhancement of the kidneys  presumably secondary to retained contrast from yesterday's pulmonary  angiogram. Bladder is overall decompressed with Underwood catheter  present, and a small volume of excreted contrast. Normal caliber of  the small and large bowel. Scattered colonic diverticula  without  evidence of acute diverticulitis. No peritoneal free fluid or fluid  collection. Large fat-containing ventral hernia. No adenopathy in the  abdomen or pelvis. Normal caliber of the abdominal aorta. Left femoral  arterial line terminates just inferior to the aortic bifurcation.    MUSCULOSKELETAL: Minimally displaced anterior rib fractures  bilaterally. Nondisplaced sternal fracture. Fractures and subcutaneous  edema in the anterior presternal soft tissues presumably related to  resuscitation effort. Degenerative changes in the spine. Postsurgical  changes of right total hip arthroplasty.      Impression    IMPRESSION:   1. Subpleural groundglass attenuation in the right lung, possibly  pulmonary infarcts given previously seen extensive pulmonary emboli.  2. Patchy nodularity in the right base suggesting aspiration.  3. Small left pleural effusion and dependent atelectasis bilaterally.  4. Trace pericardial effusion.  5. Minimally displaced anterior rib fractures and nondisplaced sternal  fracture, presumably related to resuscitation effort.    I have personally reviewed the examination and initial interpretation  and I agree with the findings.    FLOR MCCRAY MD   XR Chest Port 1 View    Narrative    EXAM: XR CHEST PORT 1 VW  9/10/2019 6:25 AM      HISTORY: Check endotracheal tube placement and ECLS cannula placement.  DO NOT log-roll patient.  Place film under patient using patient  safety handling process.    COMPARISON: CT 9/10/2019, radiograph 9/9/2019    FINDINGS: Supine AP radiograph of the chest. Endotracheal tube tip  projects 3.1 cm from the iain. Right IJ central line tip projects  over the mid SVC. Inferior approach ECMO cannulation tip projects over  the inferior cavoatrial junction. Right and left pulmonary arterial  lines. The trachea is midline. The cardiac silhouette is enlarged.  Small left pleural effusion with dense left basilar opacity, as seen  on previous CT. Patchy right basilar  opacities. Upper abdomen is  unremarkable. Known anterior rib fractures are not well demonstrated  on the study.       Impression    IMPRESSION:   1. Endotracheal tube tip projects 3.1 cm from the iain.  2. Small left pleural effusion with dense left basilar  atelectasis/consolidation and patchy right basilar opacities similar  to prior earlier CT.    I have personally reviewed the examination and initial interpretation  and I agree with the findings.    FLOR MCCRAY MD

## 2019-09-10 NOTE — PROGRESS NOTES
ECMO Shift Summary:    Patient remains on [facility] ECMO, all equipment is functioning and alarms are appropriately set. RPM's 3650 with flow range 4.31-4.4 L/min. Sweep gas is at 3 LPM and FiO2 70%. Circuit remains free of air, clot and fibrin. Cannulas are secure with no bleeding from site. Extremities are warm. Suctioned ETT for red scerctions.    Vent settings:  Ventilation Mode: CMV/AC  (Continuous Mandatory Ventilation/ Assist Control)  FiO2 (%): 45 %  Rate Set (breaths/minute): 12 breaths/min  Tidal Volume Set (mL): 450 mL  PEEP (cm H2O): 12 cmH2O  Oxygen Concentration (%): 45 %  Peak Inspiratory Pressure (cm H2O) (Drager Jannet): 32  Resp: 12  .    Heparin is running at 1200 u/kg/hr, ACT range 123-219.    No products was given.      Intake/Output Summary (Last 24 hours) at 9/10/2019 1836  Last data filed at 9/10/2019 1800  Gross per 24 hour   Intake 5283.21 ml   Output 985 ml   Net 4298.21 ml       ECHO:  No results found for this or any previous visit.  Results for orders placed in visit on 18   Echocardiogram Complete    Narrative [id number]  [id number]  [id number]  [id number]^CARRINGTON^VINOD^WILLIS           [facility]  Diagnostic and Treamtent-3rd Floor  [address] [place] [id number]     Name: XAVIER ROWELL  MRN: [id number]  : 1960  Study Date: 2018 05:59 PM  Age: 58 yrs  Gender: Female  [place] [facility] [place]  Reason For Study: Cough, Edema extremities  Ordering Physician: VINOD SHULTZ  Referring Physician: VINOD SHULTZ  Performed By: Ryley Ridley RDCS     BSA: 2.7 m2  Height: 66 in  Weight: 418 lb  HR: 85  BP: 148/77 mmHg  _____________________________________________________________________________  __        Procedure  Echocardiogram with two-dimensional, color and spectral Doppler performed.  _____________________________________________________________________________  __        Interpretation Summary  Moderate concentric  wall thickening consistent with left ventricular  hypertrophy is present.  Biventricular size and systolic function is normal.The LVEF is 55-60%.  No significant valve disease.  Inferior vena cava appers dilated measuring 2.4 cm  _____________________________________________________________________________  __        Left Ventricle  Left ventricular size is normal. Left ventricular function is normal.The EF is  55-60%. Moderate concentric wall thickening consistent with left ventricular  hypertrophy is present. Left ventricular diastolic function is indeterminate.  Regional wall motion is probably normal. Endocardial border definition  suboptimal and contrast not given.     Right Ventricle  The right ventricle is normal size. Global right ventricular function is  normal.     Atria  Both atria appear normal.     Mitral Valve  Mild mitral annular calcification is present. Trace mitral insufficiency is  present.        Aortic Valve  Mild aortic valve sclerosis is present. No aortic regurgitation is present.  Transvalvular Doppler is normal and without signs of signifcant stenosis.     Tricuspid Valve  The peak velocity of the tricuspid regurgitant jet is not obtainable.  Pulmonary artery systolic pressure cannot be assessed.     Pulmonic Valve  The pulmonic valve cannot be assessed.     Vessels  The aorta root is normal. Inferior vena cava appers dilated measuring 2.4 cm.  Ascending aorta 3.3 cm.     Pericardium  No pericardial effusion is present.     _____________________________________________________________________________  __  MMode/2D Measurements & Calculations  IVSd: 1.5 cm  LVIDd: 5.4 cm  LVIDs: 3.7 cm  LVPWd: 1.3 cm  FS: 30.5 %  LV mass(C)d: 323.5 grams  LV mass(C)dI: 118.2 grams/m2     asc Aorta Diam: 3.3 cm  LVOT diam: 2.2 cm  LVOT area: 4.0 cm2  LA Volume (BP): 71.6 ml  LA Volume Index (BP): 26.1 ml/m2  RWT: 0.47        Doppler Measurements & Calculations  MV E max chris: 67.4 cm/sec  MV A max chris: 88.8  cm/sec  MV E/A: 0.76  MV dec time: 0.17 sec  Ao V2 max: 203.2 cm/sec  Ao max P.0 mmHg  MERON(V,D): 2.3 cm2     LV V1 max P.5 mmHg  LV V1 max: 116.8 cm/sec  AV Emile Ratio (DI): 0.57  E/E' av.0  Lateral E/e': 13.4  Medial E/e': 12.6     _____________________________________________________________________________  __           Report approved by: Tariq Pappas 2018 10:50 AM          CXR:  Recent Results (from the past 24 hour(s))   CT Chest Pulmonary Embolism w Contrast   Result Value    Radiologist flags Pulmonary embolism and malpositioning of the (AA)    Narrative    CT CHEST PULMONARY EMBOLISM WITH CONTRAST   2019 6:51 PM    HISTORY: Shortness of breath.    TECHNIQUE: Pulmonary embolism protocol was performed. 83mL Isovue-370  were injected IV. After contrast injection, volumetric helical  acquisition was performed from the thoracic inlet through the upper  abdomen. Radiation dose for this scan was reduced using automated  exposure control, adjustment of the mA and/or kV according to patient  size, or iterative reconstruction technique.    COMPARISON: Chest CT performed 2011.    FINDINGS: There are extensive occlusive and nonocclusive pulmonary  emboli within the second and third order pulmonary artery branches  bilaterally. Prominence of the right heart suggests some degree of  right heart strain associated with the bilateral pulmonary embolism.  The thoracic aorta is of normal caliber, without evidence for thoracic  aortic aneurysm or dissection. Mild atherosclerotic calcification of  the thoracic aorta and coronary arteries. Small left pleural effusion.  No pericardial or right pleural effusion. An endotracheal tube is in  place, with tip 0.4 cm above the iain. This should be pulled back.  Patchy consolidation and infiltrate is noted in both lower lungs, most  likely representing atelectasis. Limited views of the upper abdomen  are unremarkable. Degenerative changes are  noted throughout the  thoracic spine.      Impression    IMPRESSION:   1. There is extensive occlusive and nonocclusive pulmonary embolism  bilaterally.  2. Prominence of the right heart suggests some degree of right heart  strain associated with the pulmonary embolism.  3. Endotracheal tube is in place, with tip 0.4 cm above the iain.  This should be pulled back approximately 2 cm for more optimal  positioning.  4. Small left pleural effusion.  5. Patchy consolidation and infiltrate in both lungs is nonspecific,  but may be related to atelectasis.     [Critical Result: Pulmonary embolism and malpositioning of the  endotracheal tube]    Finding was identified on 9/9/2019 6:53 PM.     Dr. Gibson was contacted by me on 9/9/2019 7:02 PM and verbalized  understanding of the critical result.      ANALISA SEGOVIA MD   IR Pulmonary Angiogram Bilateral    Narrative    Procedures 9/10/2019:  1. US guidance for venous access  2. Bilateral pulmonary angiogram.  3. Bilateral pulmonary artery suction thrombectomy.  4. Bilateral completion pulmonary angiogram.   5. Selective catheterization of the segmental/subsegmental pulmonary  arteries.  6. Placement bilateral infusion catheters in the  segmental/subsegmental pulmonary arteries.    History: 60 yo F with a history of HTN, HL, CYRUS, morbid obesity  transfer from [facility] for massive pulmonary embolism. Acute  intervention indicated. Patient  transferred for ECMO  support/admission. Plan for suction mechanical thrombectomy and  infusion catheter placement.      Comparison: 9/9/2019 CTA Chest.    Staff: Afia Levy MD    Fellow/Resident: Jac Fields M.D.    Monitoring: Patient was continued on pre-existing sedation. Patient  monitored by IR nursing with IR staff supervision. Respiratory therapy  present to manage ventilator. Patient remained stable throughout the  procedure.     Medications: sodium bicarbonate 8.4 % injection 100 mEq    Fluoroscopy time: 30.5  minutes    Findings/procedure:    Prior to the procedure, both verbal and written informed consent  obtained from the patient's sister, Kirsten Gould.     Patient presented with existing right 6 Fr vascular sheath. An Amplatz  stiff 0.035 guidewire was advanced into the abdominal aorta.  Over the  guidewire, tract was serially dilated to 20 Kenyan. Sheath was  exchanged for a 22 Kenyan GORE DrySeal Flex introducer sheath. Through  the sheath, a 5 Fr angled pigtail catheter was advanced through the  heart into the main pulmonary artery. Pulmonary angiogram obtained.     PULMONARY ANGIOGRAM: Large near-occlusive distal right main filling  defect extending into the proximal lobar pulmonary arteries, nearly  occlusive in the right upper lobe. Prominent filling defect of the  distal interlobar artery. Scattered segment and subsegmental filling  defects. Distal left main pulmonary artery thrombus filling defect  extending into the left midlung and lower lung lobar pulmonary  arteries. Occlusive thrombus of the left lower lobar pulmonary artery.    Stiff Amplatz guidewire exchanged for a glide wire. Over the Glidewire  the angled pigtail catheter was directed into the proximal right  pulmonary artery. Additional pulmonary angiographic images were  obtained. Glidewire and 5 Kenyan Vert catheter used to select the  right distal interlobar pulmonary artery. Angiographic images were  obtained finding thrombus of the distal interlobar proximal lower  lobar pulmonary arteries. Guidewire exchange first if Amplatz  guidewire. The INARI FlowTriever aspiration catheter was advanced over  the wire into the right pulmonary artery with the tip in the mid  interlobar artery. Multiple aspiration passes were made through the  interlobar artery with significant thrombus aspirated. Aspiration  catheter retracted into the distal right main pulmonary artery and  origin of the right upper lobar pulmonary artery with multiple  additional  passes yielding significant thrombus. Completion  angiography demonstrates resolution of the large central right  pulmonary artery filling defects.    Suction catheter was retracted. Vert catheter and Glidewire used to  select the left lower lobar pulmonary artery. Glidewire exchanged for  a stiff Amplatz guidewire. Aspiration catheter was advanced to the  left main pulmonary artery take off. Left pulmonary artery angiography  obtained through the aspiration catheter. Multiple aspiration attempts  through the left lower lobar and distal left main pulmonary artery did  not yield significant thrombus. Over a guidewire, both the medium and  large self expanding nitinol mesh discs are introduced into the left  lower lobar pulmonary artery and attempts to improve thrombus  retrieval. No significant additional thrombus was obtained. Completion  angiography demonstrated residual thrombus of the distal left main  pulmonary artery and occlusive thrombus of the left lower lobe.    Stasis is noted in the patient in the pulmonary arteries. The  patient's pressors were resumed and pulsatility improved. The patient  was administered IV bicarbonate.    Through the 22 R[place]an right groin sheath, a guidewire was advanced  into the right lower lobe segmental pulmonary arteries. 5 Fr UniFuse  90 cm/10 cm infusion length infusion catheter was advanced with the  infusion wire tip positioned in a right lower lobe segmental pulmonary  artery, spanning the interlobar pulmonary artery with proximal  sideholes in the distal main pulmonary artery.    Through the 22 [place]an) sheath, A guidewire was advanced into the left  lower lobe segmental pulmonary artery. 5 Fr UniFuse 90 cm/10 cm  infusion length infusion catheter was advanced with the infusion wire  tip positioned in a left lower lobe segmental pulmonary artery,  spanning the dominant left lower lobar occlusive thrombus in distal  main left pulmonary artery thrombus with proximal sidehole  in the  proximal left main pulmonary artery.     A final image was saved. The right common femoral vein sheath was  sutured in place with 2-0 retention sutures.    1.0 mg/h TPA infusion was initiated within each of the infusion  catheters. Infusion of 500 units per hour of heparin was initiated in  the right groin sheath. Patient transferred in stable position to the  ICU. No immediate complication.    Estimate blood loss: 690 cc    Specimens: None.      Impression    Impression:  1. Pulmonary angiography demonstrating large central distal right  main, interlobar and proximal right upper lobe thrombus. Moderate to  large distal left main and occlusive left lower lobar pulmonary artery  thrombus.  2. Successful suction thrombectomy of the right pulmonary artery with  thrombectomy of the large central right pulmonary artery thrombus.   3. No significant clot burden removed from the left pulmonary artery.  4. Bilateral infusion catheter placement.    Plan:  1. Bilateral infusion catheters with tPA infusion (1 mg/hr in each  catheter) for 12 hours total. Run saline at same volume once tPA is  completed. 500 U/hr heparin through the right groin sheath.   2. Plan for thrombolytics check/repeat pulmonary angiogram following  tPA infusion tomorrow.      CT Head w/o Contrast    Narrative    CT HEAD W/O CONTRAST 9/10/2019 4:20 AM    Provided History: ecmo    Comparison: None available.    Technique: Using multidetector thin collimation helical acquisition  technique, axial, coronal and sagittal CT images from the skull base  to the vertex were obtained without intravenous contrast.     Findings:    No intracranial hemorrhage, mass effect, or midline shift. The  ventricles are proportionate to the cerebral sulci. The gray to white  matter differentiation of the cerebral hemispheres is preserved. The  basal cisterns are patent. Hyperattenuation of the intracranial major  arteries is presumably secondary to contrast from recent  CT angiogram.    The visualized paranasal sinuses are clear. The mastoid air cells are  clear.       Impression    Impression: No acute intracranial pathology. No definite hypoxic  ischemic injury.    I have personally reviewed the examination and initial interpretation  and I agree with the findings.    MADIHA ROLDAN MD   CT Chest Abdomen Pelvis w/o Contrast    Narrative    EXAMINATION: CT CHEST ABDOMEN PELVIS W/O CONTRAST, 9/10/2019 4:21 AM    TECHNIQUE:  Helical CT images of the chest, abdomen, and pelvis were  obtained without IV contrast.    COMPARISON: CT chest 9/9/2019, CT abdomen/pelvis 4/12/2017.    PROVIDED HISTORY: ecmo.    FINDINGS:  CHEST: Patient is intubated. Eccentric or bronchial tree is patent.  Small left pleural effusion with associated compressive atelectasis.  Dependent atelectasis in the right lung. Patchy subpleural groundglass  opacities in the right lung (example series 3, image 65). Nodular  opacities in the right base with prominence of the interlobular septum  (series 3, image 88).    Heart size is within normal limits. Pericardial lipomatosis. Small  pericardial effusion. ECMO cannulation tip terminates at the superior  cavoatrial junction. Additional perfusion catheter tips extending into  the distal right and left pulmonary arteries and into the lower lobar  arteries. No mediastinal adenopathy. Visualized thyroid is  unremarkable.     ABDOMEN/PELVIS: The liver, pancreas, and spleen are unremarkable.  Cholelithiasis. No gallbladder wall thickening or pericholecystic  edema. Unchanged 3.1 cm right adrenal nodule containing macroscopic  fat suggesting a myelolipoma or other benign nodule. Left adrenal  glands are unremarkable. Apparent enhancement of the kidneys  presumably secondary to retained contrast from yesterday's pulmonary  angiogram. Bladder is overall decompressed with Underwood catheter  present, and a small volume of excreted contrast. Normal caliber of  the small and large  bowel. Scattered colonic diverticula without  evidence of acute diverticulitis. No peritoneal free fluid or fluid  collection. Large fat-containing ventral hernia. No adenopathy in the  abdomen or pelvis. Normal caliber of the abdominal aorta. Left femoral  arterial line terminates just inferior to the aortic bifurcation.    MUSCULOSKELETAL: Minimally displaced anterior rib fractures  bilaterally. Nondisplaced sternal fracture. Fractures and subcutaneous  edema in the anterior presternal soft tissues presumably related to  resuscitation effort. Degenerative changes in the spine. Postsurgical  changes of right total hip arthroplasty.      Impression    IMPRESSION:   1. Subpleural groundglass attenuation in the right lung, possibly  pulmonary infarcts given previously seen extensive pulmonary emboli.  2. Patchy nodularity in the right base suggesting aspiration.  3. Small left pleural effusion and dependent atelectasis bilaterally.  4. Trace pericardial effusion.  5. Minimally displaced anterior rib fractures and nondisplaced sternal  fracture, presumably related to resuscitation effort.    I have personally reviewed the examination and initial interpretation  and I agree with the findings.    FLOR MCCRAY MD   XR Chest Port 1 View    Narrative    EXAM: XR CHEST PORT 1 VW  9/10/2019 6:25 AM      HISTORY: Check endotracheal tube placement and ECLS cannula placement.  DO NOT log-roll patient.  Place film under patient using patient  safety handling process.    COMPARISON: CT 9/10/2019, radiograph 9/9/2019    FINDINGS: Supine AP radiograph of the chest. Endotracheal tube tip  projects 3.1 cm from the iain. Right IJ central line tip projects  over the mid SVC. Inferior approach ECMO cannulation tip projects over  the inferior cavoatrial junction. Right and left pulmonary arterial  lines. The trachea is midline. The cardiac silhouette is enlarged.  Small left pleural effusion with dense left basilar opacity, as seen  on  previous CT. Patchy right basilar opacities. Upper abdomen is  unremarkable. Known anterior rib fractures are not well demonstrated  on the study.       Impression    IMPRESSION:   1. Endotracheal tube tip projects 3.1 cm from the iain.  2. Small left pleural effusion with dense left basilar  atelectasis/consolidation and patchy right basilar opacities similar  to prior earlier CT.    I have personally reviewed the examination and initial interpretation  and I agree with the findings.    FLOR MCCRAY MD   XR Abdomen Port 1 View    Narrative    Examination:  XR ABDOMEN PORT 1 VW    Date:  9/10/2019 2:44 PM     Clinical Information: feeding tube placement     Comparison: CT-8/10/2019    Findings:     Supine radiographs of the abdomen. Left arterial sheath terminates at  L5. The left venous sheath projects off the film. Right sheath has 2  catheters that course upwards. Feeding tube projects over the proximal  duodenum. Nephrogram secondary to contrast given yesterday, indicative  of kidney disease. Nonobstructive bowel gas pattern. Visualized bowel  is unremarkable without pneumatosis or portal venous gas.        Impression    Impression:  1. Feeding tube projects over the proximal duodenum.   2. Nonobstructive bowel gas pattern.  3. Nephrogram secondary to contrast given yesterday, indicative of  renal failure.    I have personally reviewed the examination and initial interpretation  and I agree with the findings.    DANY GIRALDO MD       Labs:  Recent Labs   Lab 09/10/19  1801 09/10/19  1547 09/10/19  1358 09/10/19  1242   PH 7.44 7.43 7.47* 7.51*   PCO2 34* 36 32* 28*   PO2 107* 87 99 107*   HCO3 23 23 23 22   O2PER 45.0 45.0 45 45       Lab Results   Component Value Date    HGB 12.3 09/10/2019    PHGB 150 (H) 09/10/2019     09/10/2019    FIBR 86 (LL) 09/10/2019    INR 1.64 (H) 09/10/2019    PTT 52 (H) 09/10/2019    DD >20.0 (H) 09/10/2019    AXA 0.11 09/10/2019    ANTCH 62 (L) 09/10/2019         Plan  is to give Cryo for low Fib and continue on VA ECMO.      Abram Lundberg, RT, RRT  9/10/2019 6:36 PM

## 2019-09-10 NOTE — PROGRESS NOTES
"CLINICAL NUTRITION SERVICES - ASSESSMENT NOTE     Nutrition Prescription    RECOMMENDATIONS FOR MDs/PROVIDERS TO ORDER:  Maintain post-prandial BG at <180 mg/dL.    Malnutrition Status:    Pt does not meet two of the criteria necessary for diagnosing malnutrition but is at risk for malnutrition.    Recommendations already ordered by Registered Dietitian (RD):  Collaboration with other nutrition professionals- discussed POC with CVICU RD.  Enteral Nutrition - Initiate TF of Impact Peptide @ goal 65 ml/hr to provide 2340 kcals (13 kcal/kg dry wt/day), 147 g PRO (2.5 g/kg IBW/day), 1200 mL H2O, 100 g Fat (50% from MCTs), 218 g CHO and no Fiber daily.  Multivitamin/mineral supplement therapy - Ordered 100 mg oral Thiamin & daily multivit/min d/t low EF%, for improved cardiac fxn.    Future/Additional Recommendations:  Metabolic cart study to better assess caloric requirements d/t morbid obesity.      REASON FOR ASSESSMENT  Shira Rodriguez is a 59 year old female assessed by the dietitian for FT placement and Provider Order - Registered Dietitian to Assess and Order TF per Medical Nutrition Therapy Protocol.    NUTRITION HISTORY  Pt is intubated and sedated on ECMO, unable to interview. Presume pt had been eating normally prior to PEA arrests on 9/9. Pt had family in room that were talking to another provider; did not disturb. Pt was visualized but hands-on physical assessment was not done. Presume no macro or micronutrient deficiencies are present from pt's hx and from visual inspection.     CURRENT NUTRITION ORDERS  Diet: NPO  Intake/Tolerance: n/a    LABS  Labs reviewed- HgbA1c was 5.6% and pt had negative urinary ketones on 9/10/19  BG now ok after reaching high 200s and low 300s on 9/9 and 9/10.  EF 5-10% on 9/10 ECHO.    MEDICATIONS  Medications reviewed    ANTHROPOMETRICS  Height:   Ht Readings from Last 2 Encounters:   08/12/19 1.676 m (5' 6\")   06/26/19 1.676 m (5' 6\")     Most Recent Weight: (!) 183.1 kg (403 " lb 10.6 oz)    IBW: 59.1 kg  BMI: Obesity Grade III BMI >40 (65.2 kg/m^2)  Weight History:   Wt Readings from Last 10 Encounters:   09/10/19 (!) 183.1 kg (403 lb 10.6 oz)   09/09/19 (!) 178.6 kg (393 lb 11.9 oz)   08/12/19 (!) 181.4 kg (400 lb)   06/26/19 (!) 191 kg (421 lb)   05/23/19 (!) 195.9 kg (431 lb 14.4 oz)   05/20/19 (!) 198 kg (436 lb 8 oz)   04/29/19 (!) 194.6 kg (429 lb)   04/23/19 (!) 195 kg (429 lb 14.4 oz)   04/22/19 (!) 195 kg (430 lb)   04/08/19 (!) 195 kg (430 lb)   09/08/17 176.4 kg (389 lb) with BMI of 62.79 kg/m^2  08/21/15 181.4 kg (400 lb)    Note pt was seen here x 2 by our bariatric clinic RDs for nutrition w/u before potential sleeve gastrectomy (once in 2013 and once in 2017). At that time pt had noted weight to be approx 405 lbs. Goal had been for 350# prior to surgery. No surgical hx indicating pt had this procedure done.    Dosing Weight: 178.6 kg (dry wt from OSH) will be used for calorie calculations, and 59 kg (IBW) will be used for protein calculations.    ASSESSED NUTRITION NEEDS  Estimated Energy Needs: 0500-7423 kcals/day (11 - 14 kcals/kg)  Justification: Morbid obesity  Estimated Protein Needs: 118-148 g/day (2-2.5 g/kg IBW)  Justification: Hypercatabolism with critical illness, morbid obesity  Estimated Fluid Needs: Per provider pending fluid status    PHYSICAL FINDINGS  See malnutrition section below.  Morbid obesity     MALNUTRITION  % Intake: Decreased intake does not meet criteria (NPO x 1-2 days with hospitalization)  % Weight Loss: None noted  Subcutaneous Fat Loss: None observed  Muscle Loss: None observed on visual inspection  Fluid Accumulation/Edema: Does not meet criteria  Malnutrition Diagnosis: Pt does not meet two of the above criteria necessary for diagnosing malnutrition but is at risk for malnutrition.    NUTRITION DIAGNOSIS  Inadequate protein-energy intake related to resp status inhibiting ability to take PO as evidenced by pt NPO x 1-2 days.        INTERVENTIONS  Implementation  Nutrition Education: Not appropriate at this time due to patient condition   Collaboration with other nutrition professionals  Enteral Nutrition - Initiate  Multivitamin/mineral supplement therapy     Goals  Total avg nutritional intake to meet a minimum of 11 kcal/kg (adm wt 178.6 kg) and 2 g PRO/kg (IBW 59 kg) daily.     Monitoring/Evaluation  Progress toward goals will be monitored and evaluated per protocol.    Mariah Meraz, RD, LD  Unit RD pager: [phone]

## 2019-09-10 NOTE — PHARMACY-VANCOMYCIN DOSING SERVICE
Pharmacy Vancomycin Initial Note  Date of Service September 10, 2019  Patient's  1960  59 year old, female    Indication: Aspiration Pneumonia    Current estimated CrCl = Estimated Creatinine Clearance: 63.1 mL/min (A) (based on SCr of 1.67 mg/dL (H)).    Creatinine for last 3 days  2019:  4:52 PM Creatinine 1.28 mg/dL  9/10/2019:  2:35 AM Creatinine 1.76 mg/dL;  4:40 AM Creatinine 1.77 mg/dL; 10:11 AM Creatinine 1.67 mg/dL    Recent Vancomycin Level(s) for last 3 days  No results found for requested labs within last 72 hours.      Vancomycin IV Administrations (past 72 hours)                   vancomycin 1500 mg in 0.9% NaCl 250 ml intermittent infusion 1,500 mg (mg) 1,500 mg Given 09/10/19 0750                Nephrotoxins and other renal medications (From now, onward)    Start     Dose/Rate Route Frequency Ordered Stop    09/10/19 0659  vancomycin place zelaya - receiving intermittent dosing      1 each Intravenous SEE ADMIN INSTRUCTIONS 09/10/19 0659      09/10/19 0445  piperacillin-tazobactam (ZOSYN) 3.375 g vial to attach to  mL bag      3.375 g  over 30 Minutes Intravenous EVERY 6 HOURS 09/09/19 2238 09/15/19 0444    09/09/19 224  norepinephrine (LEVOPHED) 16 mg in  mL infusion      0.03-0.4 mcg/kg/min × 178.6 kg  5-67 mL/hr  Intravenous CONTINUOUS 19 224  vasopressin (VASOSTRICT) 40 Units in D5W 40 mL infusion      0.5-4 Units/hr  0.5-4 mL/hr  Intravenous CONTINUOUS 19 223            Contrast Orders - past 72 hours (72h ago, onward)    Start     Dose/Rate Route Frequency Ordered Stop    19 234  iodixanol (VISIPAQUE 320) injection 150 mL      150 mL INTRA-ARTERIAL ONCE 09/09/19 2340 09/10/19 0317                Plan:  1.  Start vancomycin  1500 mg IV once (before weight was obtained), then ordered additional 1000mg IV x1 when realized weight was 183kg -- total dose=2500mg IV (13.6mg/kg actBW).   2.  Goal Trough Level: 15-20 mg/L   3.  Pharmacy  will check trough levels as appropriate in 1-3 Days.    4. Serum creatinine levels will be ordered daily for the first week of therapy and at least twice weekly for subsequent weeks.    5. Everett method utilized to dose vancomycin therapy: Method 2    Cheryl Acosta RPH

## 2019-09-10 NOTE — PLAN OF CARE
Patient cannulated for VA ecmo post arrest at [facility]. Saddle Pulmonary Embolism requring IR procedure for embolectomy and thrombolytic to dual pulmonary artery cannula. Recheck PE status after 12 hours of lytic therapy. Sedated with fentanyl and versed. On norepi and Vaso for BP support,Had low dose epi during IR procedure, titrated off due to rising lactic acid.  Insuiln gtt started for glucose control, frequent glucose checks. Total of 4 amps bicarb given       P: repeat IR procedure for lytic therapy progress this afternoon. Wean pressors as able.

## 2019-09-10 NOTE — PROCEDURES
Bridle Placement:   Reason for bridle placement: securement of FT   Medicine delivered during procedure: lubricating jelly  Procedure: Successful  Location of top of clip on FT: @ 120 cm marker   Condition of nose/skin at time of bridle placement: Unremarkable   Face to Face time with patient: 5 minutes.    Neel Hood RD, ÁLVARO, [facility]  Neuro ICU  Pager: [phone]    Pager: [phone]

## 2019-09-10 NOTE — PROGRESS NOTES
ECMO Attending Progress Note  9/10/2019    Shira Rodriguez is a 59 year old female who was cannulated for ECMO 9/9/19 due to massive PE    Interval events: ECMO placed without issue, taken to IR for catheter embolectomy and EKOS catheter placement, stabilized    Physical Exam:  Temp:  [96.4  F (35.8  C)-98.4  F (36.9  C)] 98.2  F (36.8  C)  Pulse:  [] 86  Heart Rate:  [] 104  Resp:  [10-43] 12  BP: ()/(64-92) 119/77  MAP:  [61 mmHg-226 mmHg] 69 mmHg  Arterial Line BP: ()/(2-275) 89/64  FiO2 (%):  [40 %-100 %] 45 %  SpO2:  [90 %-100 %] 99 %    Intake/Output Summary (Last 24 hours) at 9/10/2019 1812  Last data filed at 9/10/2019 1800  Gross per 24 hour   Intake 5235.61 ml   Output 985 ml   Net 4250.61 ml    Ventilation Mode: CMV/AC  (Continuous Mandatory Ventilation/ Assist Control)  FiO2 (%): 45 %  Rate Set (breaths/minute): 12 breaths/min  Tidal Volume Set (mL): 450 mL  PEEP (cm H2O): 12 cmH2O  Oxygen Concentration (%): 45 %  Peak Inspiratory Pressure (cm H2O) (Drager Jannet): 32  Resp: 12     General: no acute distress, intubated and sedated  HEENT: PERRLA, conjunctiva clear, without icterus or pallor, oropharyx clear  Cardiac: RRR nl S1S2   Lungs: clear to auscultation and percussion bilaterally anterior  Abdomen: soft, nontender, non distended, no hepatosplenomegaly or masses  Extremities: without edema or cyanosis; pulses are dopplerable;   Skin: normal skin appearance without worrisome lesions.   Neurologic:intubated and sedated    Labs:  Recent Labs   Lab 09/10/19  1547 09/10/19  1358 09/10/19  1242 09/10/19  1018   PH 7.43 7.47* 7.51* 7.45   PCO2 36 32* 28* 32*   PO2 87 99 107* 129*   HCO3 23 23 22 22   O2PER 45.0 45 45 45      Recent Labs   Lab 09/10/19  1547 09/10/19  1011 09/10/19  0440 09/10/19  0235   WBC 21.2* 23.4* 32.9* 34.4*   HGB 12.3 13.2 13.9 14.5     Creatinine   Date Value Ref Range Status   09/10/2019 1.58 (H) 0.52 - 1.04 mg/dL Final   09/10/2019 1.67 (H) 0.52 - 1.04  mg/dL Final   09/10/2019 1.77 (H) 0.52 - 1.04 mg/dL Final   09/10/2019 1.76 (H) 0.52 - 1.04 mg/dL Final       ECMO Issues including assessments and plan on DOS 9/10/2019:  Neuro: Sedated for mechanical ventilation and ECMO.  NIRS stable b/l  RASS goal: -3  CV: Cardiogenic shock.  Hemodynamically stable on minimal pressors  Pulm: Flows unchanged at 4.1, Sweep unchanged at 6, Keep vent settings at rest settings  FEN/Renal: Electrolytes stable w/ replacement protocols in place, Cr stable, UOP stable  Heme: ACT goal: 160-180, Hemoglobin stable .  Goals: if O2 sat >85% Hgb >8.  If O2 Sat <85% keep Hgb 10-12.  Minimal oozing around the ECMO cannulas.  ID: Receiving empiric antibiotics  Cannulae: Position is acceptable on exam and the available imaging.  Distal perfusion cannula is in place and patent.     I have personally reviewed the ECMO flows, oxygenation and CO2 clearance, anticoagulation, and cannula position.  I have also personally assessed the patient's systemic response with hemodynamics, oxygenation, ventilation, and bleeding.       The patient requires continued ECMO support and management in the ICU.  I have discussed patient care and treatment plan with the primary team.  Turndown tomorrow and consideration of decannulation.    Kaleb Mcfarlane MD, PhD  Interventional/Critical Care Cardiology  [phone]    September 10, 2019

## 2019-09-10 NOTE — PROGRESS NOTES
ECLS Cannulation Note:    Date on: 9/9/2019  Time on: 2241  Surgeon: Denys    Arterial Cannula: 17 Fr. In the Left Femoral Artery      Venous Cannula: 25 Fr. In the Left Femoral Vein      ECMO components include [employer] Lot # [id number]    Cannulation was performed in the Cath Lab, placement was verified by fluoroscopy, cannulas are in good position.  ITAT and blood cooler are at bedside. Patient's blood type is A pos.    Earl Faulkner RT, RRT  9/10/2019 6:29 AM

## 2019-09-10 NOTE — H&P
[facility]  Interventional Cardiology History & Physical  2019          H&P:   59 year old female with a history of hypertension, hyperlipidemia, sleep apnea, morbid obesity, and chronic bronchitis who presents to the emergency department via EMS for evaluation of respiratory distress. Per EMS report, they were called to the patient's apartment due to worsening shortness of breath that gradually started two days ago with an accompanied cough. On arrival, she was only able to talk in 1-2 sentences at a time and appeared cyanotic and clammy. At [facility], CT revealed patient to have extensive bilateral pulmonary embolism with resulting right heart strain and she subsequently had cardiac arrest. She was intubated, but shortly after intubation, her heart rate began to gradually slow down and she became bradycardic in the 40's to 50's and then she went into PEA cardiac arrest. She lost her pulses and CPR was initiated and epinephrine IV and cardiac resuscitation was performed. At this point, she was also given IV TPA for massive pulmonary emboli protocol and eventually began with sustained return of spontaneous circulation and became stable enough for CT imaging which confirmed the diagnosis of massive bilateral pulmonary emboli. Cardiac stat bedside echo was also obtained which showed right heart strain consistent with pulmonary emboli. Review of CTA showed bilateral PE; EKG with new RBBB since may 2019 ekg; echo which showed severe evidence of right heart failure; initial lactate was 8.     Discussed with PERT team; [facility] and IR intervention.     Witnessed arest (y/n): y  Home or public location (y/n): y  Bystander CPR (y/n): y  Time of 911 call: In ED  Initial rhythm: PEA  Did they have intermittent ROSC (y/n): y  # of shocks 0  Epi:  3 mg  Amio:  0 mg  Bicarb: 1 amps    Initial rhythm in the cath lab: SR  First AB./199          Medications:   I have reviewed this patient's current medications    Past Medical History:   Diagnosis Date     Chronic bronchitis (H) 07/30/2015     Contact dermatitis      Depressive disorder 1985     Eczema     HANDS     Elevated liver enzymes      H/O total knee replacement, left      History of total hip replacement 10/27/2011     Hyperlipidemia      Hypertension      Morbid obesity (H)      Osteoarthrosis     right knee     Sleep apnea      Tobacco use disorder        Family History   Problem Relation Age of Onset     Thyroid Disease Mother      Hypertension Mother      Skin Cancer Mother         MMohs surgery with skin graft     Cerebrovascular Disease Mother         CVA after endarderectomy     Diabetes Mother      Breast Cancer Paternal Grandmother      Pancreatic Cancer Paternal Grandmother      Cardiovascular Paternal Aunt      Cardiovascular Paternal Uncle      Depression Other      Alcoholism Father      Thyroid Disease Sister      Alcoholism Sister      Hypertension Maternal Grandmother      Heart Disease Sister         multiple ablations     Alcoholism Sister      Prostate Cancer Paternal Grandfather      Social History     Socioeconomic History     Marital status: Single     Spouse name: Not on file     Number of children: Not on file     Years of education: Not on file     Highest education level: Not on file   Occupational History     Occupation: registered nurse     Comment: [employer]   Social Needs     Financial resource strain: Not on file     Food insecurity:     Worry: Not on file     Inability: Not on file     Transportation needs:     Medical: Not on file     Non-medical: Not on file   Tobacco Use     Smoking status: Current Every Day Smoker     Packs/day: 1.00     Years: 33.00     Pack years: 33.00     Types: Cigarettes     Start date: 1/1/1982     Smokeless tobacco: Never Used     Tobacco comment: maybe   Substance and Sexual Activity     Alcohol use: Yes     Alcohol/week: 0.0 oz      Comment: occassional     Drug use: No     Sexual activity: Never   Lifestyle     Physical activity:     Days per week: Not on file     Minutes per session: Not on file     Stress: Not on file   Relationships     Social connections:     Talks on phone: Not on file     Gets together: Not on file     Attends Yazidism service: Not on file     Active member of club or organization: Not on file     Attends meetings of clubs or organizations: Not on file     Relationship status: Not on file     Intimate partner violence:     Fear of current or ex partner: Not on file     Emotionally abused: Not on file     Physically abused: Not on file     Forced sexual activity: Not on file   Other Topics Concern     Parent/sibling w/ CABG, MI or angioplasty before 65F 55M? Not Asked   Social History Narrative     Not on file            Review of Systems:   Constitutional: negative  Skin: negative  Musculoskeletal: negative  Eyes: negative  ENT: negative  Endocrine: negative  Respiratory: negative  Cardiovascular: negative  Heme: negative  Lymph: negative  GI: negative  : negative  Neuro: as above  Psych: as above            Physical Exam:   @Vitals: There were no vitals taken for this visit.  BMI= There is no height or weight on file to calculate BMI.   GENERAL APPEARANCE: Intubated, sedated. NAD.HEENT: JVP 7. No icterus, PERRL 2 mm, ETT in place, OG tube in place  CARDIOVASCULAR: regular rate and rhythm, normal S1 and S2, no S3 or S4 and no murmur, click or rub. Normal PMI. Pulses dopplerable.  RESP: Coarse bilaterally. Mechanical ventilation.    GASTRO: Soft, bowel sounds hypoactive but present  GENITOURINARY: Underwood in place.  EXTREMITIES: Cool, 1+ edema, pulses dopplered, as above. VA ECMO cannulas in right groin, ThermoGard and IO in place right Tibia  NEURO: Sedated and intubated, Pupils equal and reactive. Fent and Versed for sedation, as below.  INTEGUMENTARY: No rashes. Cannula/Line sites CDI  LINES/TUBES/DRAINS: (noted  below) V-A ECMO Cannulas R groin, arterial sheath and ThermoGard in L groin. R radial Arterial line. ETT. OG. Underwood Catheter.    Vent Settings:      O2 Device: Mechanical Ventilator      Arterial Blood Gas:   Recent Labs   Lab 09/09/19  2238   PH 7.01*   PCO2 84*   PO2 199*   HCO3 21     CXR: pending   There were no vitals filed for this visit.No intake/output data recorded.  Recent Labs   Lab 09/09/19  1652      POTASSIUM 4.6   CHLORIDE 101   CO2 21   ANIONGAP 15*   *   BUN 17   CR 1.28*   MARIA INES 10.1     No components found for: URINE   Recent Labs   Lab 09/09/19  1652   AST 39   ALT 41   BILITOTAL 0.5   ALBUMIN 3.4   PROTTOTAL 7.6   ALKPHOS 82        Temp  Min: 96.4  F (35.8  C)  Max: 96.4  F (35.8  C)   Recent Labs   Lab 09/09/19  1652   WBC 23.1*   HGB 16.7*   HCT 51.9*   MCV 95   RDW 15.4*        Recent Labs   Lab 09/09/19  1652   INR 1.23*     Recent Labs   Lab 09/09/19  1652   *       Lines:           Data:   All lab results reviewed    Recent Results (from the past 24 hour(s))   XR Chest Port 1 View    Narrative    XR CHEST PORT 1   9/9/2019 5:00 PM     HISTORY:  SOB    COMPARISON: Film dated 6/4/2018      Impression    IMPRESSION: EKG leads are seen over the thorax. There is increased  opacity in the left retrocardiac region. This could be due to pleural  fluid and associated infiltrate or atelectasis. The right lung appears  clear.    MARK HERNANDEZ MD   XR Chest Port 1 View    Narrative    XR CHEST PORT 1   9/9/2019 6:17 PM     HISTORY:  SOB    COMPARISON: Film dated 9/9/2019    FINDINGS:  ET tube has been inserted. The iain is difficult to  identify on this view. The tip of the ET tube is probably just above  the iain. It could be pulled back. There is slightly increased  opacity in the left retrocardiac region compatible with infiltrate or  atelectasis. The right lung remains relatively clear.      Impression    IMPRESSION: ET tube tip in place. The exact position of the tip  of the  ET tube with respect to the iain is difficult to determine on this  view. The tip lies at least 3 cm below the level of the clavicular  heads.. It could be pulled back.    MARK HERNANDEZ MD   CT Chest Pulmonary Embolism w Contrast   Result Value    Radiologist flags Pulmonary embolism and malpositioning of the (AA)    Narrative    CT CHEST PULMONARY EMBOLISM WITH CONTRAST   9/9/2019 6:51 PM    HISTORY: Shortness of breath.    TECHNIQUE: Pulmonary embolism protocol was performed. 83mL Isovue-370  were injected IV. After contrast injection, volumetric helical  acquisition was performed from the thoracic inlet through the upper  abdomen. Radiation dose for this scan was reduced using automated  exposure control, adjustment of the mA and/or kV according to patient  size, or iterative reconstruction technique.    COMPARISON: Chest CT performed 6/1/2011.    FINDINGS: There are extensive occlusive and nonocclusive pulmonary  emboli within the second and third order pulmonary artery branches  bilaterally. Prominence of the right heart suggests some degree of  right heart strain associated with the bilateral pulmonary embolism.  The thoracic aorta is of normal caliber, without evidence for thoracic  aortic aneurysm or dissection. Mild atherosclerotic calcification of  the thoracic aorta and coronary arteries. Small left pleural effusion.  No pericardial or right pleural effusion. An endotracheal tube is in  place, with tip 0.4 cm above the iain. This should be pulled back.  Patchy consolidation and infiltrate is noted in both lower lungs, most  likely representing atelectasis. Limited views of the upper abdomen  are unremarkable. Degenerative changes are noted throughout the  thoracic spine.      Impression    IMPRESSION:   1. There is extensive occlusive and nonocclusive pulmonary embolism  bilaterally.  2. Prominence of the right heart suggests some degree of right heart  strain associated with the pulmonary  embolism.  3. Endotracheal tube is in place, with tip 0.4 cm above the iain.  This should be pulled back approximately 2 cm for more optimal  positioning.  4. Small left pleural effusion.  5. Patchy consolidation and infiltrate in both lungs is nonspecific,  but may be related to atelectasis.     [Critical Result: Pulmonary embolism and malpositioning of the  endotracheal tube]    Finding was identified on 9/9/2019 6:53 PM.     Dr. Gibson was contacted by me on 9/9/2019 7:02 PM and verbalized  understanding of the critical result.      ANALISA SEGOVIA MD              Assessment and Plan:   Shira Rodriguez is a 59 year old female who was admitted on 9/9/2019.    Neurology: Intubated, sedated, paralyzed.   --continue versed, fentanyl, and cis as needed to maintain paralysis - RASS goal -4 to -5      Cardiovascular / Hemodynamics: PEA arrest secondary to massive PE.   CTA-PE study demonstrates bilateral distal main and proximal lobar pulmonary emboli, greater on the right with elevated RV/LV ratio. Peripheral V-A ECMO inserted for hemodynamic support for massive PE. Went to cath lab and then to IR for suction mechanical thrombectomy.  LA 8.6   TTE: dilated RV, severely reduced.   EKG: SR  --wean pressors/inotropes as able  --echo tomorrow with ECMO turndown  --hold temp at 36.  --ACT goal 180-200  --hold lipitor for now given likely hepatic injury during arrest  --hold ACE/ARB for now given likely reduced renal fxn after arrest  --holding beta blocker given shock      Pulmonary: PEA arrest secondary to massive PE. See above.   Vent Settings:  ETT in appropriate place.  Now weaning vent requirements.  CXR: Lines in stable position.   --wean vent as able  --daily CXR  Chest CT  --Q2h ABGs for now  --consider scheduled duonebs if signs of lung dz, currently PRN     GI and Nutrition: No known medical hx.    --monitor BID LFTs  --NPO while cooled - nutrition consult pending feeding tube placement once he is warmed    --bowel regimen - on hold for now due to hypothermia  --GI Prophylaxis: PPI    Renal, Fluid and Electrolytes: Cr 1.28. UOP continue to monitor.  --monitor urine output  --maintain K>3 and Mg>2    Infectious Disease: No signs of infection. Leukocytosis c/w arrest. Blood cultures collected.   --vancomycin/zosyn x5 days for ECMO  --daily blood cultures  --monitor for signs of infection given cooling, lines, and leukocytosis   Hematology and Oncology: Receiving heparin for ECMO and ASA/ticagrelor for KARLA.   --cryo PRN fibrinogen < 200; FFP for INR >2  --Transfuse for Hgb<10  --heparin gtt for ECMO with ACT goal 160-180  --US LE w/ arterial duplex per ECMO protocol   --DVT PPX: Heparin as above   Endocrinology: No known medical history. BG elevated.  --insulin gtt  --stress dose steroids due to persistent hypotension -    Lines: R femoral arterial and venous ECMO cannulae September 9, 2019  L femoral arterial line September 9, 2019  R radial arterial line September 9, 2019  ETT September 9, 2019  Underwood catheter September 9, 2019  OG tube September 9, 2019  Restraint: needed    Current lines are required for patient management       Family update by me today: Yes     Code Status:    The pt was discussed and evaluated with Dr. Mcfarlane, Cardiologist, attending physician, who agrees with the assessment and plan above.     Thomas Pratt MD  [facility]  Cardiology Fellow, PGY-6  [phone]

## 2019-09-10 NOTE — PROGRESS NOTES
Intensivist note  Received patient from ED intubated on vent on Epinephrine infusion at 0.5. She came in with dyspnea, had brief arrest requiring CPR during intubation and was quickly resuscitated. She had a CTA showing massive PE, and received TPA 50 mgms IV.     On assessment here she appeared comfortable on vent; distant breath sounds and heart sounds barely audible; she did not appear mottled     Review of CTA showed bilateral PE; EKG with new RBBB since may 2019 ekg; echo which showed severe evidence of right heart failure; initial lactate was 8.     I discussed case with PERT team from [facility] at length. After discussion, it was decided to transfer her straight to their cath lab to initiate AV ecmo and likely IR intervention after to remove clot. I have made arrangements for this. (of note, I did speak with IR here, but they felt she might ultimately need more care than we could provide here, and I concur).    I spoke with family (2 sisters) at bedside to inform them, and they were both supportive of this plan.     Transfer is now being arranged.     I spent 40 minutes of critical care time assessing her for massive obstructive/circulatory shock due to PE, and acute respiratory failure requiring mechanical ventilation, including overseeing and adjusting pressors and managing ventilator, as well as arranging appropriate interventions for her underlying illness.     md Florian  September 9, 2019

## 2019-09-10 NOTE — PROGRESS NOTES
Patient Name: Shira Rodriguez  Medical Record Number: [id number]  Today's Date: 9/9/2019    Procedure: pulmonary angiography suction thrombectomy catheter directed thrombolytics  Proceduralist: Dr.Dsouza Sheehan      Procedure start time: 0030  Procedure end time: 0340    Report given to: JAGDEEP RN    Other Notes: Pt arrived to IR room 4 from cath lab.Consent reviewed.  Pt positioned supine and monitored per protocol. Pt tolerated procedure without any noted complications. Pt transferred  to  .     @ 0048 no new orders   @ 1045 no new orders    [id number] RN called about artline. ICU RN called MD notified of possible PEA. No new orders.    0249-Notified by ICU RN pt needing bicarb called MD at  [id number] states he is enroute to IR.    ICU RN Ramez at bedside for report. Bicarb given per MD order.

## 2019-09-10 NOTE — CONSULTS
INTERVENTIONAL RADIOLOGY CONSULT    HPI: 58 yo F with a history of HTN, HL, CYRUS, morbid obesity transfer from [facility] for massive pulmonary embolism. Patient presented to [facility] ED with SOB, cyanosis and hypoxia requiring intubation. Coded with ROSC. Systemic tPA administered in the ED.  Admitted to ICU on pressors and ventilation by intensivist, Dr. Mcguire. Per report patient has lactate of 8.1 and positive troponin at0.64. Possible/presumed right RBBB on EKG. Echo with severely dilated RV/severe RV dysfunction. CTA-PE study demonstrates bilateral distal main and proximal lobar pulmonary emboli, greater on the right with elevated RV/LV ratio. Requiring significant pressor support.    A/P: 58 yo F with Massive PE. PERT team activated. Acute intervention indicated:  - Patient will be transferred to [facility] for ECMO support/admission.  - transfer from Cath lab to IR for urgent suction mechanical thrombectomy.     Labs WNL for procedure. NPO status unknown, procedure is emergent. Consent will be done prior to procedure.    Recent Labs   Lab Test 09/09/19  1652   WBC 23.1*   HGB 16.7*        Lab Results   Component Value Date    INR 1.23 09/09/2019    INR 1.01 04/20/2017       If procedure has been approved, IR charge RN can be contacted at [phone] for estimated time of procedure.     Discussed with Dr. Mildred MD.    Jac Fields MD  Interventional Radiology Fellow  IR pass pager: [phone]  Personal pager: [phone]      Physician Attestation     I, Afia Levy, agree with the findings and plan of care as documented in the note.

## 2019-09-10 NOTE — ED NOTES
Pt given RSI medications for intubation at 1713. ET tube placed at 1714. Pt began to susan down at approx. 1716. No pulse noted at 1718. CPR started. See code record for medication administration times. Pulse returned at 1755 and patient has been maintaining.

## 2019-09-10 NOTE — PLAN OF CARE
Report given to [facility] cath lab. EMS personnel at bedside to transfer patient. Consent received from patient's sisters.

## 2019-09-11 ENCOUNTER — APPOINTMENT (OUTPATIENT)
Dept: GENERAL RADIOLOGY | Facility: CLINIC | Age: 59
DRG: 003 | End: 2019-09-11
Attending: STUDENT IN AN ORGANIZED HEALTH CARE EDUCATION/TRAINING PROGRAM
Payer: COMMERCIAL

## 2019-09-11 ENCOUNTER — APPOINTMENT (OUTPATIENT)
Dept: CARDIOLOGY | Facility: CLINIC | Age: 59
DRG: 003 | End: 2019-09-11
Attending: STUDENT IN AN ORGANIZED HEALTH CARE EDUCATION/TRAINING PROGRAM
Payer: COMMERCIAL

## 2019-09-11 ENCOUNTER — APPOINTMENT (OUTPATIENT)
Dept: GENERAL RADIOLOGY | Facility: CLINIC | Age: 59
DRG: 003 | End: 2019-09-11
Payer: COMMERCIAL

## 2019-09-11 ENCOUNTER — APPOINTMENT (OUTPATIENT)
Dept: GENERAL RADIOLOGY | Facility: CLINIC | Age: 59
DRG: 003 | End: 2019-09-11
Attending: INTERNAL MEDICINE
Payer: COMMERCIAL

## 2019-09-11 ENCOUNTER — ALLIED HEALTH/NURSE VISIT (OUTPATIENT)
Dept: NEUROLOGY | Facility: CLINIC | Age: 59
DRG: 003 | End: 2019-09-11
Attending: PSYCHIATRY & NEUROLOGY
Payer: COMMERCIAL

## 2019-09-11 DIAGNOSIS — I46.9 CARDIAC ARREST (H): Primary | ICD-10-CM

## 2019-09-11 LAB
ALBUMIN SERPL-MCNC: 2 G/DL (ref 3.4–5)
ALBUMIN SERPL-MCNC: 2.1 G/DL (ref 3.4–5)
ALBUMIN SERPL-MCNC: 2.1 G/DL (ref 3.4–5)
ALBUMIN SERPL-MCNC: 2.2 G/DL (ref 3.4–5)
ALP SERPL-CCNC: 46 U/L (ref 40–150)
ALP SERPL-CCNC: 49 U/L (ref 40–150)
ALP SERPL-CCNC: 51 U/L (ref 40–150)
ALP SERPL-CCNC: 56 U/L (ref 40–150)
ALT SERPL W P-5'-P-CCNC: 109 U/L (ref 0–50)
ALT SERPL W P-5'-P-CCNC: 121 U/L (ref 0–50)
ALT SERPL W P-5'-P-CCNC: 85 U/L (ref 0–50)
ALT SERPL W P-5'-P-CCNC: 97 U/L (ref 0–50)
ANION GAP SERPL CALCULATED.3IONS-SCNC: 6 MMOL/L (ref 3–14)
ANION GAP SERPL CALCULATED.3IONS-SCNC: 7 MMOL/L (ref 3–14)
ANION GAP SERPL CALCULATED.3IONS-SCNC: 8 MMOL/L (ref 3–14)
ANION GAP SERPL CALCULATED.3IONS-SCNC: 9 MMOL/L (ref 3–14)
APTT PPP: 113 SEC (ref 22–37)
APTT PPP: 49 SEC (ref 22–37)
APTT PPP: 64 SEC (ref 22–37)
APTT PPP: 64 SEC (ref 22–37)
AST SERPL W P-5'-P-CCNC: 45 U/L (ref 0–45)
AST SERPL W P-5'-P-CCNC: 55 U/L (ref 0–45)
AST SERPL W P-5'-P-CCNC: 62 U/L (ref 0–45)
AST SERPL W P-5'-P-CCNC: 79 U/L (ref 0–45)
AT III ACT/NOR PPP CHRO: 67 % (ref 85–135)
BASE DEFICIT BLDA-SCNC: 0.1 MMOL/L
BASE DEFICIT BLDA-SCNC: 0.3 MMOL/L
BASE DEFICIT BLDA-SCNC: 0.5 MMOL/L
BASE DEFICIT BLDA-SCNC: 0.6 MMOL/L
BASE EXCESS BLDA CALC-SCNC: 0 MMOL/L
BASE EXCESS BLDA CALC-SCNC: 0 MMOL/L
BASE EXCESS BLDA CALC-SCNC: 0.6 MMOL/L
BASE EXCESS BLDA CALC-SCNC: 0.8 MMOL/L
BASE EXCESS BLDA CALC-SCNC: 0.9 MMOL/L
BASE EXCESS BLDA CALC-SCNC: 1.7 MMOL/L
BASE EXCESS BLDA CALC-SCNC: 1.9 MMOL/L
BASE EXCESS BLDA CALC-SCNC: 2.9 MMOL/L
BASE EXCESS BLDA CALC-SCNC: 3.2 MMOL/L
BASE EXCESS BLDA CALC-SCNC: 3.4 MMOL/L
BASE EXCESS BLDV CALC-SCNC: 0.2 MMOL/L
BASE EXCESS BLDV CALC-SCNC: 0.4 MMOL/L
BILIRUB SERPL-MCNC: 0.5 MG/DL (ref 0.2–1.3)
BILIRUB SERPL-MCNC: 0.6 MG/DL (ref 0.2–1.3)
BLD PROD TYP BPU: NORMAL
BLD UNIT ID BPU: 0
BLOOD PRODUCT CODE: NORMAL
BPU ID: NORMAL
BUN SERPL-MCNC: 35 MG/DL (ref 7–30)
BUN SERPL-MCNC: 45 MG/DL (ref 7–30)
BUN SERPL-MCNC: 47 MG/DL (ref 7–30)
BUN SERPL-MCNC: 49 MG/DL (ref 7–30)
CA-I BLD-MCNC: 4.8 MG/DL (ref 4.4–5.2)
CA-I BLD-MCNC: 4.8 MG/DL (ref 4.4–5.2)
CA-I BLD-MCNC: 4.9 MG/DL (ref 4.4–5.2)
CA-I BLD-MCNC: 4.9 MG/DL (ref 4.4–5.2)
CALCIUM SERPL-MCNC: 7.9 MG/DL (ref 8.5–10.1)
CALCIUM SERPL-MCNC: 8 MG/DL (ref 8.5–10.1)
CALCIUM SERPL-MCNC: 8.2 MG/DL (ref 8.5–10.1)
CALCIUM SERPL-MCNC: 8.2 MG/DL (ref 8.5–10.1)
CHLORIDE SERPL-SCNC: 113 MMOL/L (ref 94–109)
CHLORIDE SERPL-SCNC: 114 MMOL/L (ref 94–109)
CO2 SERPL-SCNC: 25 MMOL/L (ref 20–32)
CO2 SERPL-SCNC: 26 MMOL/L (ref 20–32)
CO2 SERPL-SCNC: 26 MMOL/L (ref 20–32)
CO2 SERPL-SCNC: 27 MMOL/L (ref 20–32)
CREAT SERPL-MCNC: 1.49 MG/DL (ref 0.52–1.04)
CREAT SERPL-MCNC: 1.65 MG/DL (ref 0.52–1.04)
CREAT SERPL-MCNC: 1.67 MG/DL (ref 0.52–1.04)
CREAT SERPL-MCNC: 1.82 MG/DL (ref 0.52–1.04)
CRP SERPL-MCNC: 97 MG/L (ref 0–8)
D DIMER PPP FEU-MCNC: >20 UG/ML FEU (ref 0–0.5)
D DIMER PPP FEU-MCNC: >20 UG/ML FEU (ref 0–0.5)
ERYTHROCYTE [DISTWIDTH] IN BLOOD BY AUTOMATED COUNT: 15.6 % (ref 10–15)
ERYTHROCYTE [DISTWIDTH] IN BLOOD BY AUTOMATED COUNT: 15.7 % (ref 10–15)
ERYTHROCYTE [DISTWIDTH] IN BLOOD BY AUTOMATED COUNT: 15.7 % (ref 10–15)
ERYTHROCYTE [DISTWIDTH] IN BLOOD BY AUTOMATED COUNT: 15.8 % (ref 10–15)
ERYTHROCYTE [DISTWIDTH] IN BLOOD BY AUTOMATED COUNT: NORMAL % (ref 10–15)
ERYTHROCYTE [SEDIMENTATION RATE] IN BLOOD BY WESTERGREN METHOD: 11 MM/H (ref 0–30)
FIBRINOGEN PPP-MCNC: 223 MG/DL (ref 200–420)
FIBRINOGEN PPP-MCNC: 349 MG/DL (ref 200–420)
GFR SERPL CREATININE-BSD FRML MDRD: 30 ML/MIN/{1.73_M2}
GFR SERPL CREATININE-BSD FRML MDRD: 33 ML/MIN/{1.73_M2}
GFR SERPL CREATININE-BSD FRML MDRD: 34 ML/MIN/{1.73_M2}
GFR SERPL CREATININE-BSD FRML MDRD: 38 ML/MIN/{1.73_M2}
GLUCOSE BLD-MCNC: 137 MG/DL (ref 70–99)
GLUCOSE BLD-MCNC: 141 MG/DL (ref 70–99)
GLUCOSE BLD-MCNC: 163 MG/DL (ref 70–99)
GLUCOSE BLD-MCNC: 167 MG/DL (ref 70–99)
GLUCOSE BLDC GLUCOMTR-MCNC: 118 MG/DL (ref 70–99)
GLUCOSE BLDC GLUCOMTR-MCNC: 122 MG/DL (ref 70–99)
GLUCOSE BLDC GLUCOMTR-MCNC: 127 MG/DL (ref 70–99)
GLUCOSE BLDC GLUCOMTR-MCNC: 129 MG/DL (ref 70–99)
GLUCOSE BLDC GLUCOMTR-MCNC: 134 MG/DL (ref 70–99)
GLUCOSE BLDC GLUCOMTR-MCNC: 138 MG/DL (ref 70–99)
GLUCOSE BLDC GLUCOMTR-MCNC: 139 MG/DL (ref 70–99)
GLUCOSE BLDC GLUCOMTR-MCNC: 143 MG/DL (ref 70–99)
GLUCOSE BLDC GLUCOMTR-MCNC: 143 MG/DL (ref 70–99)
GLUCOSE BLDC GLUCOMTR-MCNC: 150 MG/DL (ref 70–99)
GLUCOSE BLDC GLUCOMTR-MCNC: 151 MG/DL (ref 70–99)
GLUCOSE BLDC GLUCOMTR-MCNC: 162 MG/DL (ref 70–99)
GLUCOSE BLDC GLUCOMTR-MCNC: 163 MG/DL (ref 70–99)
GLUCOSE BLDC GLUCOMTR-MCNC: 168 MG/DL (ref 70–99)
GLUCOSE SERPL-MCNC: 135 MG/DL (ref 70–99)
GLUCOSE SERPL-MCNC: 138 MG/DL (ref 70–99)
GLUCOSE SERPL-MCNC: 153 MG/DL (ref 70–99)
GLUCOSE SERPL-MCNC: 169 MG/DL (ref 70–99)
HCO3 BLD-SCNC: 24 MMOL/L (ref 21–28)
HCO3 BLD-SCNC: 25 MMOL/L (ref 21–28)
HCO3 BLD-SCNC: 25 MMOL/L (ref 21–28)
HCO3 BLD-SCNC: 26 MMOL/L (ref 21–28)
HCO3 BLD-SCNC: 27 MMOL/L (ref 21–28)
HCO3 BLD-SCNC: 27 MMOL/L (ref 21–28)
HCO3 BLD-SCNC: 28 MMOL/L (ref 21–28)
HCO3 BLDA-SCNC: 25 MMOL/L (ref 21–28)
HCO3 BLDA-SCNC: 26 MMOL/L (ref 21–28)
HCO3 BLDV-SCNC: 26 MMOL/L (ref 21–28)
HCO3 BLDV-SCNC: 27 MMOL/L (ref 21–28)
HCT VFR BLD AUTO: 30.4 % (ref 35–47)
HCT VFR BLD AUTO: 31.1 % (ref 35–47)
HCT VFR BLD AUTO: 33 % (ref 35–47)
HCT VFR BLD AUTO: 35.9 % (ref 35–47)
HCT VFR BLD AUTO: NORMAL % (ref 35–47)
HGB BLD-MCNC: 10.3 G/DL (ref 11.7–15.7)
HGB BLD-MCNC: 11.1 G/DL (ref 11.7–15.7)
HGB BLD-MCNC: 9.2 G/DL (ref 11.7–15.7)
HGB BLD-MCNC: 9.8 G/DL (ref 11.7–15.7)
HGB BLD-MCNC: NORMAL G/DL (ref 11.7–15.7)
HGB FREE PLAS-MCNC: 40 MG/DL
INR PPP: 1.36 (ref 0.86–1.14)
INR PPP: 1.37 (ref 0.86–1.14)
INR PPP: 1.38 (ref 0.86–1.14)
KCT BLD-ACNC: 150 SEC (ref 75–150)
KCT BLD-ACNC: 154 SEC (ref 75–150)
KCT BLD-ACNC: 154 SEC (ref 75–150)
KCT BLD-ACNC: 158 SEC (ref 75–150)
KCT BLD-ACNC: 162 SEC (ref 75–150)
LACTATE BLD-SCNC: 0.8 MMOL/L (ref 0.7–2)
LACTATE BLD-SCNC: 1.2 MMOL/L (ref 0.7–2)
LACTATE BLD-SCNC: 1.4 MMOL/L (ref 0.7–2)
LACTATE BLD-SCNC: 1.4 MMOL/L (ref 0.7–2)
LDH SERPL L TO P-CCNC: 626 U/L (ref 81–234)
LMWH PPP CHRO-ACNC: 0.2 IU/ML
LMWH PPP CHRO-ACNC: 0.26 IU/ML
LMWH PPP CHRO-ACNC: 0.35 IU/ML
LMWH PPP CHRO-ACNC: 0.65 IU/ML
MAGNESIUM SERPL-MCNC: 2.1 MG/DL (ref 1.6–2.3)
MAGNESIUM SERPL-MCNC: 2.1 MG/DL (ref 1.6–2.3)
MAGNESIUM SERPL-MCNC: 2.4 MG/DL (ref 1.6–2.3)
MAGNESIUM SERPL-MCNC: 2.4 MG/DL (ref 1.6–2.3)
MCH RBC QN AUTO: 28.5 PG (ref 26.5–33)
MCH RBC QN AUTO: 28.8 PG (ref 26.5–33)
MCH RBC QN AUTO: 29.3 PG (ref 26.5–33)
MCH RBC QN AUTO: 29.7 PG (ref 26.5–33)
MCH RBC QN AUTO: NORMAL PG (ref 26.5–33)
MCHC RBC AUTO-ENTMCNC: 30.3 G/DL (ref 31.5–36.5)
MCHC RBC AUTO-ENTMCNC: 30.9 G/DL (ref 31.5–36.5)
MCHC RBC AUTO-ENTMCNC: 31.2 G/DL (ref 31.5–36.5)
MCHC RBC AUTO-ENTMCNC: 31.5 G/DL (ref 31.5–36.5)
MCHC RBC AUTO-ENTMCNC: NORMAL G/DL (ref 31.5–36.5)
MCV RBC AUTO: 93 FL (ref 78–100)
MCV RBC AUTO: 94 FL (ref 78–100)
MCV RBC AUTO: NORMAL FL (ref 78–100)
NSE SERPL-MCNC: 29.8 UG/L (ref 3.7–8.9)
O2/TOTAL GAS SETTING VFR VENT: 15 %
O2/TOTAL GAS SETTING VFR VENT: 45 %
OXYHGB MFR BLD: 96 % (ref 92–100)
OXYHGB MFR BLD: 97 % (ref 92–100)
OXYHGB MFR BLD: 98 % (ref 92–100)
OXYHGB MFR BLDA: 95 % (ref 75–100)
OXYHGB MFR BLDA: 98 % (ref 75–100)
OXYHGB MFR BLDV: 75 %
OXYHGB MFR BLDV: 83 %
PCO2 BLD: 39 MM HG (ref 35–45)
PCO2 BLD: 41 MM HG (ref 35–45)
PCO2 BLD: 42 MM HG (ref 35–45)
PCO2 BLD: 43 MM HG (ref 35–45)
PCO2 BLD: 44 MM HG (ref 35–45)
PCO2 BLD: 44 MM HG (ref 35–45)
PCO2 BLD: 45 MM HG (ref 35–45)
PCO2 BLDA: 40 MM HG (ref 35–45)
PCO2 BLDA: 46 MM HG (ref 35–45)
PCO2 BLDV: 43 MM HG (ref 40–50)
PCO2 BLDV: 50 MM HG (ref 40–50)
PH BLD: 7.37 PH (ref 7.35–7.45)
PH BLD: 7.39 PH (ref 7.35–7.45)
PH BLD: 7.4 PH (ref 7.35–7.45)
PH BLD: 7.41 PH (ref 7.35–7.45)
PH BLD: 7.43 PH (ref 7.35–7.45)
PH BLD: 7.43 PH (ref 7.35–7.45)
PH BLD: 7.44 PH (ref 7.35–7.45)
PH BLDA: 7.35 PH (ref 7.35–7.45)
PH BLDA: 7.42 PH (ref 7.35–7.45)
PH BLDV: 7.34 PH (ref 7.32–7.43)
PH BLDV: 7.38 PH (ref 7.32–7.43)
PHOSPHATE SERPL-MCNC: 4.1 MG/DL (ref 2.5–4.5)
PLATELET # BLD AUTO: 107 10E9/L (ref 150–450)
PLATELET # BLD AUTO: 127 10E9/L (ref 150–450)
PLATELET # BLD AUTO: 143 10E9/L (ref 150–450)
PLATELET # BLD AUTO: 83 10E9/L (ref 150–450)
PLATELET # BLD AUTO: NORMAL 10E9/L (ref 150–450)
PLATELET INHIBITION WITH AA: 90 %
PLATELET INHIBITION WITH ADP: 100 %
PO2 BLD: 100 MM HG (ref 80–105)
PO2 BLD: 101 MM HG (ref 80–105)
PO2 BLD: 102 MM HG (ref 80–105)
PO2 BLD: 103 MM HG (ref 80–105)
PO2 BLD: 105 MM HG (ref 80–105)
PO2 BLD: 107 MM HG (ref 80–105)
PO2 BLD: 107 MM HG (ref 80–105)
PO2 BLD: 109 MM HG (ref 80–105)
PO2 BLD: 118 MM HG (ref 80–105)
PO2 BLD: 124 MM HG (ref 80–105)
PO2 BLD: 129 MM HG (ref 80–105)
PO2 BLD: 97 MM HG (ref 80–105)
PO2 BLDA: 231 MM HG (ref 80–105)
PO2 BLDA: 87 MM HG (ref 80–105)
PO2 BLDV: 45 MM HG (ref 25–47)
PO2 BLDV: 51 MM HG (ref 25–47)
POTASSIUM SERPL-SCNC: 4 MMOL/L (ref 3.4–5.3)
POTASSIUM SERPL-SCNC: 4.2 MMOL/L (ref 3.4–5.3)
POTASSIUM SERPL-SCNC: 4.4 MMOL/L (ref 3.4–5.3)
POTASSIUM SERPL-SCNC: 4.9 MMOL/L (ref 3.4–5.3)
PROCALCITONIN SERPL-MCNC: 13.15 NG/ML
PROT SERPL-MCNC: 4.9 G/DL (ref 6.8–8.8)
PROT SERPL-MCNC: 5 G/DL (ref 6.8–8.8)
PROT SERPL-MCNC: 5 G/DL (ref 6.8–8.8)
PROT SERPL-MCNC: 5.2 G/DL (ref 6.8–8.8)
RBC # BLD AUTO: 3.23 10E12/L (ref 3.8–5.2)
RBC # BLD AUTO: 3.3 10E12/L (ref 3.8–5.2)
RBC # BLD AUTO: 3.51 10E12/L (ref 3.8–5.2)
RBC # BLD AUTO: 3.86 10E12/L (ref 3.8–5.2)
RBC # BLD AUTO: NORMAL 10E12/L (ref 3.8–5.2)
SODIUM SERPL-SCNC: 146 MMOL/L (ref 133–144)
SODIUM SERPL-SCNC: 148 MMOL/L (ref 133–144)
TRANSFUSION STATUS PATIENT QL: NORMAL
TROPONIN I SERPL-MCNC: 0.09 UG/L (ref 0–0.04)
TROPONIN I SERPL-MCNC: 0.11 UG/L (ref 0–0.04)
TROPONIN I SERPL-MCNC: 0.16 UG/L (ref 0–0.04)
TROPONIN I SERPL-MCNC: 0.21 UG/L (ref 0–0.04)
VANCOMYCIN SERPL-MCNC: 17.2 MG/L
WBC # BLD AUTO: 22.3 10E9/L (ref 4–11)
WBC # BLD AUTO: 26.7 10E9/L (ref 4–11)
WBC # BLD AUTO: 28.5 10E9/L (ref 4–11)
WBC # BLD AUTO: 29 10E9/L (ref 4–11)
WBC # BLD AUTO: NORMAL 10E9/L (ref 4–11)

## 2019-09-11 PROCEDURE — 93308 TTE F-UP OR LMTD: CPT | Mod: 26 | Performed by: INTERNAL MEDICINE

## 2019-09-11 PROCEDURE — 93321 DOPPLER ECHO F-UP/LMTD STD: CPT | Mod: 26 | Performed by: INTERNAL MEDICINE

## 2019-09-11 PROCEDURE — 82805 BLOOD GASES W/O2 SATURATION: CPT | Performed by: INTERNAL MEDICINE

## 2019-09-11 PROCEDURE — 25000132 ZZH RX MED GY IP 250 OP 250 PS 637: Performed by: INTERNAL MEDICINE

## 2019-09-11 PROCEDURE — 25800030 ZZH RX IP 258 OP 636: Performed by: INTERNAL MEDICINE

## 2019-09-11 PROCEDURE — 87040 BLOOD CULTURE FOR BACTERIA: CPT | Performed by: INTERNAL MEDICINE

## 2019-09-11 PROCEDURE — 84100 ASSAY OF PHOSPHORUS: CPT | Performed by: INTERNAL MEDICINE

## 2019-09-11 PROCEDURE — 83735 ASSAY OF MAGNESIUM: CPT | Performed by: STUDENT IN AN ORGANIZED HEALTH CARE EDUCATION/TRAINING PROGRAM

## 2019-09-11 PROCEDURE — 00000146 ZZHCL STATISTIC GLUCOSE BY METER IP

## 2019-09-11 PROCEDURE — 25000128 H RX IP 250 OP 636: Performed by: STUDENT IN AN ORGANIZED HEALTH CARE EDUCATION/TRAINING PROGRAM

## 2019-09-11 PROCEDURE — 83605 ASSAY OF LACTIC ACID: CPT | Performed by: INTERNAL MEDICINE

## 2019-09-11 PROCEDURE — 82330 ASSAY OF CALCIUM: CPT | Performed by: INTERNAL MEDICINE

## 2019-09-11 PROCEDURE — 20000004 ZZH R&B ICU UMMC

## 2019-09-11 PROCEDURE — 74018 RADEX ABDOMEN 1 VIEW: CPT

## 2019-09-11 PROCEDURE — 83051 HEMOGLOBIN PLASMA: CPT | Performed by: INTERNAL MEDICINE

## 2019-09-11 PROCEDURE — 83615 LACTATE (LD) (LDH) ENZYME: CPT | Performed by: INTERNAL MEDICINE

## 2019-09-11 PROCEDURE — 82803 BLOOD GASES ANY COMBINATION: CPT | Performed by: INTERNAL MEDICINE

## 2019-09-11 PROCEDURE — 85610 PROTHROMBIN TIME: CPT | Performed by: INTERNAL MEDICINE

## 2019-09-11 PROCEDURE — 85347 COAGULATION TIME ACTIVATED: CPT

## 2019-09-11 PROCEDURE — 27211402 ZZH SENSOR NIRS OXIMETER, ADULT

## 2019-09-11 PROCEDURE — 80053 COMPREHEN METABOLIC PANEL: CPT | Performed by: STUDENT IN AN ORGANIZED HEALTH CARE EDUCATION/TRAINING PROGRAM

## 2019-09-11 PROCEDURE — G0463 HOSPITAL OUTPT CLINIC VISIT: HCPCS

## 2019-09-11 PROCEDURE — 85520 HEPARIN ASSAY: CPT | Performed by: STUDENT IN AN ORGANIZED HEALTH CARE EDUCATION/TRAINING PROGRAM

## 2019-09-11 PROCEDURE — 84484 ASSAY OF TROPONIN QUANT: CPT | Performed by: INTERNAL MEDICINE

## 2019-09-11 PROCEDURE — 93308 TTE F-UP OR LMTD: CPT

## 2019-09-11 PROCEDURE — 40000014 ZZH STATISTIC ARTERIAL MONITORING DAILY

## 2019-09-11 PROCEDURE — 25000125 ZZHC RX 250: Performed by: INTERNAL MEDICINE

## 2019-09-11 PROCEDURE — 25000132 ZZH RX MED GY IP 250 OP 250 PS 637: Performed by: STUDENT IN AN ORGANIZED HEALTH CARE EDUCATION/TRAINING PROGRAM

## 2019-09-11 PROCEDURE — 87070 CULTURE OTHR SPECIMN AEROBIC: CPT | Performed by: INTERNAL MEDICINE

## 2019-09-11 PROCEDURE — 36415 COLL VENOUS BLD VENIPUNCTURE: CPT | Performed by: INTERNAL MEDICINE

## 2019-09-11 PROCEDURE — C9113 INJ PANTOPRAZOLE SODIUM, VIA: HCPCS | Performed by: INTERNAL MEDICINE

## 2019-09-11 PROCEDURE — 94640 AIRWAY INHALATION TREATMENT: CPT | Mod: 76

## 2019-09-11 PROCEDURE — 99291 CRITICAL CARE FIRST HOUR: CPT | Mod: GC | Performed by: INTERNAL MEDICINE

## 2019-09-11 PROCEDURE — 25000128 H RX IP 250 OP 636: Performed by: INTERNAL MEDICINE

## 2019-09-11 PROCEDURE — 93325 DOPPLER ECHO COLOR FLOW MAPG: CPT | Mod: 26 | Performed by: INTERNAL MEDICINE

## 2019-09-11 PROCEDURE — 40000275 ZZH STATISTIC RCP TIME EA 10 MIN

## 2019-09-11 PROCEDURE — 85730 THROMBOPLASTIN TIME PARTIAL: CPT | Performed by: INTERNAL MEDICINE

## 2019-09-11 PROCEDURE — 85384 FIBRINOGEN ACTIVITY: CPT | Performed by: INTERNAL MEDICINE

## 2019-09-11 PROCEDURE — 71045 X-RAY EXAM CHEST 1 VIEW: CPT

## 2019-09-11 PROCEDURE — 80202 ASSAY OF VANCOMYCIN: CPT | Performed by: INTERNAL MEDICINE

## 2019-09-11 PROCEDURE — 85652 RBC SED RATE AUTOMATED: CPT | Performed by: INTERNAL MEDICINE

## 2019-09-11 PROCEDURE — 82947 ASSAY GLUCOSE BLOOD QUANT: CPT | Performed by: INTERNAL MEDICINE

## 2019-09-11 PROCEDURE — 25000125 ZZHC RX 250: Performed by: STUDENT IN AN ORGANIZED HEALTH CARE EDUCATION/TRAINING PROGRAM

## 2019-09-11 PROCEDURE — 84484 ASSAY OF TROPONIN QUANT: CPT | Performed by: STUDENT IN AN ORGANIZED HEALTH CARE EDUCATION/TRAINING PROGRAM

## 2019-09-11 PROCEDURE — 87077 CULTURE AEROBIC IDENTIFY: CPT | Performed by: INTERNAL MEDICINE

## 2019-09-11 PROCEDURE — 27210437 ZZH NUTRITION PRODUCT SEMIELEM INTERMED LITER

## 2019-09-11 PROCEDURE — 85610 PROTHROMBIN TIME: CPT | Performed by: STUDENT IN AN ORGANIZED HEALTH CARE EDUCATION/TRAINING PROGRAM

## 2019-09-11 PROCEDURE — 25800030 ZZH RX IP 258 OP 636: Performed by: STUDENT IN AN ORGANIZED HEALTH CARE EDUCATION/TRAINING PROGRAM

## 2019-09-11 PROCEDURE — 85379 FIBRIN DEGRADATION QUANT: CPT | Performed by: INTERNAL MEDICINE

## 2019-09-11 PROCEDURE — 94640 AIRWAY INHALATION TREATMENT: CPT

## 2019-09-11 PROCEDURE — 95951 ZZHC EEG VIDEO EACH 24 HR: CPT | Mod: ZF

## 2019-09-11 PROCEDURE — 84145 PROCALCITONIN (PCT): CPT | Performed by: INTERNAL MEDICINE

## 2019-09-11 PROCEDURE — 33949 ECMO/ECLS DAILY MGMT ARTERY: CPT

## 2019-09-11 PROCEDURE — 85027 COMPLETE CBC AUTOMATED: CPT

## 2019-09-11 PROCEDURE — 94003 VENT MGMT INPAT SUBQ DAY: CPT

## 2019-09-11 PROCEDURE — 85300 ANTITHROMBIN III ACTIVITY: CPT | Performed by: INTERNAL MEDICINE

## 2019-09-11 PROCEDURE — 71045 X-RAY EXAM CHEST 1 VIEW: CPT | Mod: 77

## 2019-09-11 PROCEDURE — 85730 THROMBOPLASTIN TIME PARTIAL: CPT | Performed by: STUDENT IN AN ORGANIZED HEALTH CARE EDUCATION/TRAINING PROGRAM

## 2019-09-11 PROCEDURE — 85520 HEPARIN ASSAY: CPT | Performed by: INTERNAL MEDICINE

## 2019-09-11 PROCEDURE — 80053 COMPREHEN METABOLIC PANEL: CPT | Performed by: INTERNAL MEDICINE

## 2019-09-11 PROCEDURE — 85027 COMPLETE CBC AUTOMATED: CPT | Performed by: INTERNAL MEDICINE

## 2019-09-11 PROCEDURE — 83735 ASSAY OF MAGNESIUM: CPT | Performed by: INTERNAL MEDICINE

## 2019-09-11 PROCEDURE — 86140 C-REACTIVE PROTEIN: CPT | Performed by: INTERNAL MEDICINE

## 2019-09-11 PROCEDURE — P9016 RBC LEUKOCYTES REDUCED: HCPCS

## 2019-09-11 RX ORDER — HEPARIN SODIUM (PORCINE) LOCK FLUSH IV SOLN 100 UNIT/ML 100 UNIT/ML
5-10 SOLUTION INTRAVENOUS EVERY 30 MIN PRN
Status: DISCONTINUED | OUTPATIENT
Start: 2019-09-11 | End: 2019-09-11

## 2019-09-11 RX ORDER — HEPARIN SODIUM (PORCINE) LOCK FLUSH IV SOLN 100 UNIT/ML 100 UNIT/ML
3000-6000 SOLUTION INTRAVENOUS EVERY 30 MIN PRN
Status: DISCONTINUED | OUTPATIENT
Start: 2019-09-11 | End: 2019-09-18

## 2019-09-11 RX ORDER — DEXMEDETOMIDINE HYDROCHLORIDE 4 UG/ML
.2-1.2 INJECTION, SOLUTION INTRAVENOUS CONTINUOUS
Status: DISCONTINUED | OUTPATIENT
Start: 2019-09-11 | End: 2019-09-23

## 2019-09-11 RX ORDER — ALBUMIN, HUMAN INJ 5% 5 %
SOLUTION INTRAVENOUS
Status: DISCONTINUED
Start: 2019-09-11 | End: 2019-09-12 | Stop reason: HOSPADM

## 2019-09-11 RX ORDER — MIDAZOLAM (PF) 1 MG/ML IN 0.9 % SODIUM CHLORIDE INTRAVENOUS SOLUTION
1-4 CONTINUOUS
Status: DISCONTINUED | OUTPATIENT
Start: 2019-09-11 | End: 2019-09-23

## 2019-09-11 RX ORDER — HEPARIN SODIUM 10000 [USP'U]/100ML
0-3500 INJECTION, SOLUTION INTRAVENOUS CONTINUOUS
Status: DISCONTINUED | OUTPATIENT
Start: 2019-09-11 | End: 2019-09-11

## 2019-09-11 RX ORDER — FUROSEMIDE 10 MG/ML
60 INJECTION INTRAMUSCULAR; INTRAVENOUS ONCE
Status: COMPLETED | OUTPATIENT
Start: 2019-09-11 | End: 2019-09-11

## 2019-09-11 RX ADMIN — MIDAZOLAM HYDROCHLORIDE 2 MG: 2 INJECTION, SOLUTION INTRAMUSCULAR; INTRAVENOUS at 11:09

## 2019-09-11 RX ADMIN — ALBUTEROL SULFATE 2.5 MG: 2.5 SOLUTION RESPIRATORY (INHALATION) at 16:07

## 2019-09-11 RX ADMIN — FUROSEMIDE 60 MG: 10 INJECTION, SOLUTION INTRAVENOUS at 09:36

## 2019-09-11 RX ADMIN — HUMAN INSULIN 4 UNITS/HR: 100 INJECTION, SOLUTION SUBCUTANEOUS at 10:51

## 2019-09-11 RX ADMIN — MIDAZOLAM HYDROCHLORIDE 2 MG: 2 INJECTION, SOLUTION INTRAMUSCULAR; INTRAVENOUS at 17:30

## 2019-09-11 RX ADMIN — MIDAZOLAM HYDROCHLORIDE 2 MG: 2 INJECTION, SOLUTION INTRAMUSCULAR; INTRAVENOUS at 15:02

## 2019-09-11 RX ADMIN — MULTIVITAMIN 15 ML: LIQUID ORAL at 09:10

## 2019-09-11 RX ADMIN — HUMAN INSULIN 8 UNITS/HR: 100 INJECTION, SOLUTION SUBCUTANEOUS at 20:19

## 2019-09-11 RX ADMIN — ALBUTEROL SULFATE 2.5 MG: 2.5 SOLUTION RESPIRATORY (INHALATION) at 04:25

## 2019-09-11 RX ADMIN — ALBUTEROL SULFATE 2.5 MG: 2.5 SOLUTION RESPIRATORY (INHALATION) at 00:17

## 2019-09-11 RX ADMIN — PANTOPRAZOLE SODIUM 40 MG: 40 INJECTION, POWDER, LYOPHILIZED, FOR SOLUTION INTRAVENOUS at 09:10

## 2019-09-11 RX ADMIN — DEXMEDETOMIDINE 0.7 MCG/KG/HR: 100 INJECTION, SOLUTION, CONCENTRATE INTRAVENOUS at 19:37

## 2019-09-11 RX ADMIN — DEXMEDETOMIDINE 0.7 MCG/KG/HR: 100 INJECTION, SOLUTION, CONCENTRATE INTRAVENOUS at 23:30

## 2019-09-11 RX ADMIN — PIPERACILLIN SODIUM AND TAZOBACTAM SODIUM 3.38 G: 3; .375 INJECTION, POWDER, LYOPHILIZED, FOR SOLUTION INTRAVENOUS at 22:43

## 2019-09-11 RX ADMIN — PIPERACILLIN SODIUM AND TAZOBACTAM SODIUM 3.38 G: 3; .375 INJECTION, POWDER, LYOPHILIZED, FOR SOLUTION INTRAVENOUS at 04:24

## 2019-09-11 RX ADMIN — Medication 0.03 MCG/KG/MIN: at 03:37

## 2019-09-11 RX ADMIN — HEPARIN SODIUM (PORCINE) LOCK FLUSH IV SOLN 100 UNIT/ML 3000 UNITS: 100 SOLUTION at 20:20

## 2019-09-11 RX ADMIN — HEPARIN SODIUM 21.92 UNITS/KG/HR: 10000 INJECTION, SOLUTION INTRAVENOUS at 03:37

## 2019-09-11 RX ADMIN — DEXMEDETOMIDINE 0.5 MCG/KG/HR: 100 INJECTION, SOLUTION, CONCENTRATE INTRAVENOUS at 09:16

## 2019-09-11 RX ADMIN — Medication 7 MG/HR: at 04:31

## 2019-09-11 RX ADMIN — PIPERACILLIN SODIUM AND TAZOBACTAM SODIUM 3.38 G: 3; .375 INJECTION, POWDER, LYOPHILIZED, FOR SOLUTION INTRAVENOUS at 16:12

## 2019-09-11 RX ADMIN — HUMAN INSULIN 8 UNITS/HR: 100 INJECTION, SOLUTION SUBCUTANEOUS at 16:35

## 2019-09-11 RX ADMIN — HEPARIN SODIUM 35.41 UNITS/KG/HR: 10000 INJECTION, SOLUTION INTRAVENOUS at 17:05

## 2019-09-11 RX ADMIN — THIAMINE HCL TAB 100 MG 100 MG: 100 TAB at 09:10

## 2019-09-11 RX ADMIN — Medication 75 MCG/HR: at 10:23

## 2019-09-11 RX ADMIN — DEXMEDETOMIDINE 0.7 MCG/KG/HR: 100 INJECTION, SOLUTION, CONCENTRATE INTRAVENOUS at 16:13

## 2019-09-11 RX ADMIN — ALBUTEROL SULFATE 2.5 MG: 2.5 SOLUTION RESPIRATORY (INHALATION) at 12:29

## 2019-09-11 RX ADMIN — MIDAZOLAM HYDROCHLORIDE 2 MG: 2 INJECTION, SOLUTION INTRAMUSCULAR; INTRAVENOUS at 20:55

## 2019-09-11 RX ADMIN — ALBUTEROL SULFATE 2.5 MG: 2.5 SOLUTION RESPIRATORY (INHALATION) at 23:57

## 2019-09-11 RX ADMIN — PROPOFOL 30 MCG/KG/MIN: 10 INJECTION, EMULSION INTRAVENOUS at 07:05

## 2019-09-11 RX ADMIN — MIDAZOLAM HYDROCHLORIDE 2 MG: 2 INJECTION, SOLUTION INTRAMUSCULAR; INTRAVENOUS at 23:00

## 2019-09-11 RX ADMIN — VANCOMYCIN HYDROCHLORIDE 2000 MG: 1 INJECTION, POWDER, LYOPHILIZED, FOR SOLUTION INTRAVENOUS at 15:11

## 2019-09-11 RX ADMIN — SODIUM CHLORIDE, PRESERVATIVE FREE 590 UNITS: 5 INJECTION INTRAVENOUS at 18:22

## 2019-09-11 RX ADMIN — ALBUTEROL SULFATE 2.5 MG: 2.5 SOLUTION RESPIRATORY (INHALATION) at 08:06

## 2019-09-11 RX ADMIN — PIPERACILLIN SODIUM AND TAZOBACTAM SODIUM 3.38 G: 3; .375 INJECTION, POWDER, LYOPHILIZED, FOR SOLUTION INTRAVENOUS at 10:23

## 2019-09-11 RX ADMIN — DEXMEDETOMIDINE 0.7 MCG/KG/HR: 100 INJECTION, SOLUTION, CONCENTRATE INTRAVENOUS at 12:52

## 2019-09-11 RX ADMIN — Medication: at 22:44

## 2019-09-11 RX ADMIN — MIDAZOLAM HYDROCHLORIDE 2 MG: 2 INJECTION, SOLUTION INTRAMUSCULAR; INTRAVENOUS at 10:15

## 2019-09-11 ASSESSMENT — ACTIVITIES OF DAILY LIVING (ADL)
ADLS_ACUITY_SCORE: 19

## 2019-09-11 ASSESSMENT — MIFFLIN-ST. JEOR: SCORE: 2396.5

## 2019-09-11 NOTE — PROGRESS NOTES
Cardiology Progress Note    Assessment & Plan      59 year old female with a history of hypertension, hyperlipidemia, sleep apnea, morbid obesity, and chronic bronchitis who presents to the emergency department via EMS for evaluation of respiratory distress s/p intubation and PEA arrest with CTA consistent with bilateral PE transferred here for [facility] ECMO thrombectomy and catheter directed lytics on 9/9.    Plan for today  - switch versed for propofol  - turn down today  - wean off NE  -- monitor DIC labs  - gentle diuresis    Neurology: Intubated, sedated  --continue versed, fentanyl propofol in favor of fentanyl   - initial CTH no acute changes, moving when sedation is weaned down    Cardiovascular / Hemodynamics: PEA arrest secondary to massive PE.   CTA-PE study demonstrates bilateral distal main and proximal lobar pulmonary emboli, greater on the right with elevated RV/LV ratio. Peripheral V-A ECMO inserted for hemodynamic support for massive PE. Went to cath lab and then to IR for suction mechanical thrombectomy.  LA 8.6   TTE: dilated RV, severely reduced.   EKG: snus tachy  --wean pressors/inotropes as able  --ACT goal 160-180  --hold lipitor for now given likely hepatic injury during arrest  --hold ACE/ARB for now given likely reduced renal fxn after arrest  --holding beta blocker given shock   - slowly weaning off pressors -on 0.03NE   - turn down today    Pulmonary: PEA arrest secondary to massive PE. See above.   Vent Settings:  ETT in appropriate place.  Now weaning vent requirements.  CXR: Lines in stable position.   --wean vent as able  --daily CXR  - s/p thrombectomy and catheter directed lytics 9/9-9/10  Chest CTSubpleural groundglass attenuation in the right lung, possibly  pulmonary infarcts given previously seen extensive pulmonary emboli.   Patchy nodularity in the right base suggesting aspiration.  --Q2h ABGs for now  --consider scheduled duonebs if signs of lung dz, currently PRN     GI and  Nutrition: No known medical hx.    --monitor BID LFTs  - nutrition consult   --GI Prophylaxis: PPI    Renal, Fluid and Electrolytes: Cr 1.8 up from 1.2 UOP continue to monitor.  --monitor urine output  --maintain K>3 and Mg>2    Infectious Disease: No signs of infection. Leukocytosis c/w arrest. Blood cultures collected.   --vancomycin/zosyn x5 days for ECMO  --daily blood cultures  --monitor for signs of infection given cooling, lines, and leukocytosis   Hematology and Oncology: Receiving heparin for ECMO and  PE.   --cryo PRN fibrinogen < 200; FFP for INR >2  --Transfuse for Hgb<10  --heparin gtt for ECMO with ACT goal 160-180  --US LE w/ arterial duplex per ECMO protocol   --DVT PPX: Heparin as above  - course complicated by DIC with fibrinogen of 82 on 9/10 got 5 unit(s) of cryo   Endocrinology: No known medical history. BG elevated.  --insulin gtt   Lines: R femoral arterial and venous ECMO cannulae September 9, 2019  L femoral arterial line September 9, 2019  R radial arterial line September 9, 2019  ETT September 9, 2019  Underwood catheter September 9, 2019  OG tube September 9, 2019  Restraint: needed    Current lines are required for patient management       Family update by me today: Yes      Code Status:     The pt was discussed and evaluated with Dr. Mcfarlane, Cardiologist, attending physician, who agrees with the assessment and plan above.     Elvira Chilel    Interval History   Moving all extremities on 7 versed and 75 fentanyl and still moving   90/64(72) now with pulsatility sats of 98%  PH 7.4 CO2 41 O2 100 HCO3 36     On 0.03 NE  SCr 1.8 BUN 35   Net + 4.7L yesterday     Ventilation Mode: CMV/AC  (Continuous Mandatory Ventilation/ Assist Control)  FiO2 (%): 45 %  Rate Set (breaths/minute): 12 breaths/min  Tidal Volume Set (mL): 450 mL  PEEP (cm H2O): 12 cmH2O  Oxygen Concentration (%): 45 %  Resp: 14        Physical Exam   Temp: 98.2  F (36.8  C) Temp src: Esophageal     Heart Rate:  "102 Resp: 14 SpO2: 98 % O2 Device: Mechanical Ventilator    Vitals:    09/10/19 0600 09/11/19 0530   Weight: (!) 183.1 kg (403 lb 10.6 oz) (!) 180 kg (396 lb 13.3 oz)     Vital Signs with Ranges  Temp:  [97  F (36.1  C)-98.4  F (36.9  C)] 98.2  F (36.8  C)  Heart Rate:  [] 102  Resp:  [12-14] 14  MAP:  [59 mmHg-98 mmHg] 68 mmHg  Arterial Line BP: ()/(53-93) 89/62  FiO2 (%):  [45 %] 45 %  SpO2:  [95 %-100 %] 98 %  I/O last 3 completed shifts:  In: 5666.47 [I.V.:4614.47; NG/GT:500]  Out: 1701 [Urine:1051; Emesis/NG output:650]    Heart Rate: 102, Pulse 86, temperature 98.2  F (36.8  C), resp. rate 14, height 1.676 m (5' 5.98\"), weight (!) 180 kg (396 lb 13.3 oz), SpO2 98 %, not currently breastfeeding.  396 lbs 13.25 oz  GEN:  Morbidly obese sedated and intubated  CV:  Distant heart sounds   LUNGS:  Decreased breath sounds  ABD:  Active bowel sounds, soft, non-tender/non-distended.  No rebound/guarding/rigidity.  EXT:  No edema or cyanosis.  ECMO canula in place with only scant oozing   Underwood in place    Medications     cisatracurium (NIMBEX) infusion ADULT       IV fluid REPLACEMENT ONLY       IV fluid REPLACEMENT ONLY       EPINEPHrine IV infusion ADULT Stopped (09/10/19 0625)     fentaNYL 75 mcg/hr (09/11/19 0700)     heparin 2 unit/mL in 0.9% NaCl 3 mL/hr (09/11/19 0700)     HEParin 1,700 Units/hr (09/11/19 0700)     insulin (regular) Stopped (09/10/19 1300)     midazolam Stopped (09/11/19 0707)     nitroPRUsside (NIPRIDE) IV infusion ADULT/PEDS GREATER than or EQUAL to 45 kg std conc       - MEDICATION INSTRUCTIONS -       norepinephrine 0.07 mcg/kg/min (09/11/19 0723)     - MEDICATION INSTRUCTIONS -       phenylephrine       propofol (DIPRIVAN) infusion 30 mcg/kg/min (09/11/19 0705)     sodium chloride Stopped (09/10/19 2005)     vasopressin (PITRESSIN) infusion ADULT (40 mL) Stopped (09/10/19 1300)       albuterol  2.5 mg Nebulization Q4H     artificial tears   Both Eyes Q8H     multivitamins " w/minerals  15 mL Per Feeding Tube Daily     pantoprazole (PROTONIX) IV  40 mg Intravenous Daily     piperacillin-tazobactam  3.375 g Intravenous Q6H     sodium chloride (PF)  3 mL Intracatheter Q8H     vancomycin place zelaya - receiving intermittent dosing  1 each Intravenous See Admin Instructions     vitamin B1  100 mg Oral or Feeding Tube Daily       Data   Recent Labs   Lab 09/11/19  0346 09/10/19  2206 09/10/19  1547   WBC 29.0* 25.1* 21.2*   HGB 11.1* 11.7 12.3   MCV 93 92 91   * 140* 157   INR 1.38* 1.46* 1.64*   * 147* 148*   POTASSIUM 4.9 4.6 4.1   CHLORIDE 114* 113* 113*   CO2 25 24 24   BUN 35* 34* 31*   CR 1.82* 1.63* 1.58*   ANIONGAP 7 10 11   MARIA INES 8.2* 8.4* 7.9*   *  138* 110*  112* 85  84   ALBUMIN 2.2* 2.2* 2.1*   PROTTOTAL 5.2* 5.3* 5.2*   BILITOTAL 0.6 0.5 0.7   ALKPHOS 56 55 59   * 135* 151*   AST 79* 101* 148*   TROPI 0.213* 0.335* 0.626*       Recent Results (from the past 24 hour(s))   XR Abdomen Port 1 View    Narrative    Examination:  XR ABDOMEN PORT 1 VW    Date:  9/10/2019 2:44 PM     Clinical Information: feeding tube placement     Comparison: CT-8/10/2019    Findings:     Supine radiographs of the abdomen. Left arterial sheath terminates at  L5. The left venous sheath projects off the film. Right sheath has 2  catheters that course upwards. Feeding tube projects over the proximal  duodenum. Nephrogram secondary to contrast given yesterday, indicative  of kidney disease. Nonobstructive bowel gas pattern. Visualized bowel  is unremarkable without pneumatosis or portal venous gas.        Impression    Impression:  1. Feeding tube projects over the proximal duodenum.   2. Nonobstructive bowel gas pattern.  3. Nephrogram secondary to contrast given yesterday, indicative of  renal failure.    I have personally reviewed the examination and initial interpretation  and I agree with the findings.    DANY GIRALDO MD   XR Abdomen Port 1 View    Narrative    EXAM: XR  ABDOMEN PORT 1 VW  9/11/2019 1:15 AM      HISTORY: OG placement    COMPARISON: 9/10/2019    FINDINGS: Supine radiograph of the abdomen. Gastric tube tip projects  over the stomach, sidehole projects at the level of the  gastroesophageal junction. Feeding tube is not fully included in the  field-of-view, tip partially visualized at the inferior margins of the  study. Inferior approach ECMO cannulation tip projects over the  inferior cavoatrial junction. Partially visualized catheter tip  projects over the low SVC. Kidneys are partially visualized and dense  in appearance, presumably retained contrast from prior angiography.  Relative paucity of bowel gas. Small left pleural effusion with left  basilar opacities.       Impression    IMPRESSION: Gastric tube sidehole projects above the level of the  gastroesophageal junction. Recommend advancing 5 - 10 cm.    I have personally reviewed the examination and initial interpretation  and I agree with the findings.    LAURA ESCOBEDO MD

## 2019-09-11 NOTE — PROCEDURES
Procedure Date: 09/10/2019      EEG #-1      This is day #1 of 24-hour Video EEG recording.       DATE OF RECORDING/SERVICE DATE:  09/10/2019       SOURCE FILE DURATION:  13 hours, 21 minutes, and 2 seconds.        CLINICAL SUMMARY:  The patient is a 59-year-old female with history of PEA arrest secondary to bilateral PE.  Patient underwent video EEG monitoring to evaluate for seizures.     MEDICATIONS:  Fentanyl drip 50 mcg per hour, which increased to 75 at 10, lorazepam drip 6-8 mg per hour, propofol drip 50 mcg/kg per minute, which is stopped at 5:00 a.m.     TECHNICAL SUMMARY: This continuous video- EEG monitoring procedure was performed with 23 scalp electrodes in 10-20 electrode system placement, and additional scalp, precordial and other surface electrodes used for electrical referencing and artifact detection.  Video monitoring was utilized and periodically reviewed by EEG technologists and the physician for electroclinical correlations.    INTERICTAL ACTIVITY:  No posterior dominant rhythm was appreciated.  Diffuse 2-7 Hz theta-delta activities were present throughout the recording.  Rare faster beta activities were also seen superimposed over slower frequencies.  Intermittent brief generalized attenuations of 1 second were also seen, which constitute less than 10% of the recording.  No sleep-wake cycling was appreciated.  No epileptiform discharges were present.   The patient was stimulated with ICU protocol at approximately 10:35.  No clinical response or EEG changes were noted.     CLINICAL/ICTAL EVENTS:  No electrographic or clinical seizures were recorded.     IMPRESSION:  This is an abnormal video EEG due to the presence of diffuse theta-delta slowing, nonreactivity and absence of PDR and sleep-wake cycling, consistent with severe diffuse nonspecific encephalopathy.  No epileptiform discharges were present.  No electrographic or clinical seizures were recorded.         JUSTINE PHIPPS MD              D: 2019   T: 2019   MT: LIZA      Name:     XAVIER ROWELL   MRN:      [id number]        Account:        [id number]   :      1960           Procedure Date: 09/10/2019      Document: [id number]

## 2019-09-11 NOTE — PLAN OF CARE
Sedated on versed and fentanyl. Able to move all extremities, purposeful movement noted, does not follow commands. Sinus tach 100's, non perfusing beats at times, unchanged from previous. Continues on ECMO with flows of 4, 3650 RPMs. Weaned sweep to 2.5, O2 to 60%. Levo at 0.03 mcg/kg/min to maintain MAPs>65. Heparin gtt at 1700 units/hr. TF advanced to 45 ml/hr (goal of 65), well-tolerated. Increased FW flushes to 60 ml q4h for hypernatremia. OG placed with large amount of thick, dark brown output, team aware. Underwood exchanged d/t clogging, unable to flush, adequate UOP after exchange. Continue with POC. Notify CSI with concerns.

## 2019-09-11 NOTE — PROGRESS NOTES
[facility], [facility] Nurse Inpatient Adult Pressure Injury Prevention Assessment: ECMO  Initial     Positioning Tolerance: Fair  Date of ECMO cannulation: 9/9/19  Arterial Cannula: 17 Fr. In the Left Femoral Artery            Venous Cannula: 25 Fr. In the Left Femoral Vein    Presence of Ischemia: No    Pressure Injury Prevention Interventions In Place:  Z flow Positioner under head, Pillows for repositioning, TAPs Wedge Positioners in use, Heel off-loading boots and Mepilex Sacral Dressing   Current support surface: Bariatric Low air loss mattress       Pressure Injury Prevention Interventions Added:  Pillows under calves for heel suspension   mepilex dressings in back intertriginous folds        Plan of Care for Positioning and Pressure Injury Prevention  Reposition patient every 1-2 hours using TAP Wedges  Position head on Z flow positioner, mold indentation at areas of pressure points.  Pad ECMO groin and chest cannula under rigid connectors with Optifoam or Soft cloth  Heel off-loading Boots at all times  Sacral Mepilex for Prevention, change every 5 days and prn  Low Air loss mattress    Patient History:   According to medical record: 59 year old female with a history of hypertension, hyperlipidemia, sleep apnea, morbid obesity, and chronic bronchitis who presents to the emergency department via EMS for evaluation of respiratory distress s/p intubation and PEA arrest with CTA consistent with bilateral PE transferred here for [facility] ECMO thrombectomy and catheter directed lytics on 9/9    Current Diet / Nutrition:     Orders Placed This Encounter      Advance Diet as Tolerated: Clear Liquid Diet      Output:    I/O last 3 completed shifts:  In: 3960.22 [I.V.:2133.22; NG/GT:815]  Out: 3231 [Urine:2231; Emesis/NG output:1000]  Containment: of urine/stool: Urinary Catheter    Risk Assessment:   Sensory Perception: 2-->very limited  Moisture: 4-->rarely moist  Activity:  1-->bedfast  Mobility: 2-->very limited  Nutrition: 2-->probably inadequate  Friction and Shear: 2-->potential problem  Ole Score: 13    Labs:    Recent Labs   Lab 09/11/19  1005 09/11/19  0346  09/10/19  1011   ALBUMIN 2.1* 2.2*   < > 2.1*   HGB 10.3* 11.1*   < > 13.2   INR  --  1.38*   < > 1.79*   WBC 28.5* 29.0*   < > 23.4*   A1C  --   --   --  5.6   CRP  --  97.0*  --   --     < > = values in this interval not displayed.       Focused Assessment:  Left groin ECMO cannulation site    Pressure Injury Present::No    Discussed plan of care with Nurse  WOC Nurse follow-up plan:weekly

## 2019-09-11 NOTE — PROGRESS NOTES
[facility], [place]   Neurology Daily Note          Assessment and Plan:   #1 Concern anoxic brain injury after cardiac arrest and massive PE      Assessment: The patient is a 59 year old lady who was admitted after bilateral PE and 2x cardiac arrest s/p CPR. Our service was consulted for neurological evaluation and prognosticaton. She was unresponsive at the time of our evaluation, however, per nursing she has repeatedly demonstrating volitional movements of her upper extremities when sedation was decreased. These are reassuring findings and signify a significant amount of preserved neurologic function. The EEG is also reassuring with delta-theta slowing and the absence of seizures or malignant patterns. We expect her neurologic exam to be reassuring when sedation is stopped and to continue to improve thereafter. Please do not hesitate to contact us with any questions or if the patient does not improve neurologically as would be expected.       Plan:   - Can decrease neurologic checks to the unit's routine frequency  - D/C EEG monitoring             Interval History:     Continues to be mechanically ventilated and intubated.  When sedative medications temporarily weaned overnight and during the day, nurse commented on volitional movement of patient's upper extremities bilaterally toward her face while being suctioned. Otherwise, patient remained stable ON.    EEG report this morning: This is an abnormal video EEG due to the presence of diffuse theta-delta slowing on reactivity and absence of PDR and sleep-wake cycling, consistent with moderately severe diffuse nonspecific encephalopathy.  No epileptiform discharges were present.  No electrographic or clinical seizures were recorded.           Review of Systems:   Review of systems not obtained due to patient factors - mental status, intubation and sedation          Medications:     Current Facility-Administered Medications Ordered in  Epic   Medication Dose Route Frequency Last Rate Last Dose     albuterol (PROVENTIL) neb solution 2.5 mg  2.5 mg Nebulization Q4H   2.5 mg at 09/11/19 1229     artificial tears ophthalmic ointment   Both Eyes Q8H         calcium chloride injection 1 g  1 g Intravenous Q10 Min PRN   1 g at 09/10/19 1619     cisatracurium (NIMBEX) 200 mg in D5W 100 mL infusion  1-10 mcg/kg/min Intravenous Continuous         dexmedetomidine (PRECEDEX) 400 mcg in 0.9% sodium chloride 100 mL  0.2-0.7 mcg/kg/hr (Dosing Weight) Intravenous Continuous 31.3 mL/hr at 09/11/19 1252 0.7 mcg/kg/hr at 09/11/19 1252     dextrose 10 % 1,000 mL infusion   Intravenous Continuous PRN         dextrose 10 % 1,000 mL infusion   Intravenous Continuous PRN         glucose gel 15-30 g  15-30 g Oral Q15 Min PRN        Or     dextrose 50 % injection 25-50 mL  25-50 mL Intravenous Q15 Min PRN        Or     glucagon injection 1 mg  1 mg Subcutaneous Q15 Min PRN         EPINEPHrine (ADRENALIN) 5 mg in sodium chloride 0.9 % 250 mL infusion  0.03-0.3 mcg/kg/min Intravenous Continuous   Stopped at 09/10/19 0625     fentaNYL (PF) (SUBLIMAZE) injection 50 mcg  50 mcg Intravenous Q30 Min PRN         fentaNYL (SUBLIMAZE) infusion   mcg/hr Intravenous Continuous 1.5 mL/hr at 09/11/19 1200 75 mcg/hr at 09/11/19 1200     heparin 100 UNIT/ML injection 300-590 Units  5-10 Units/kg (Ideal) Other Q30 Min PRN         heparin 2 unit/mL in 0.9% NaCl (for REPERFUSION CATHETER)  3 mL/hr Intravenous Continuous 3 mL/hr at 09/11/19 1200 3 mL/hr at 09/11/19 1200     heparin infusion 25,000 units in 0.45% NaCl 250 mL  10-50 Units/kg/hr (Ideal) Other Continuous 19 mL/hr at 09/11/19 1200 1,900 Units/hr at 09/11/19 1200     insulin 1 unit/mL in saline (NovoLIN, HumuLIN Regular) drip - ADULT IV Infusion  0-24 Units/hr Intravenous Continuous 5 mL/hr at 09/11/19 1200 5 Units/hr at 09/11/19 1200     lidocaine (LMX4) cream   Topical Q1H PRN         lidocaine 1 % 0.1-1 mL  0.1-1 mL Other  Q1H PRN         magnesium sulfate 2 g in NS intermittent infusion (PharMEDium or FV Cmpd)  2 g Intravenous Daily PRN   2 g at 09/10/19 0918     magnesium sulfate 4 g in 100 mL sterile water (premade)  4 g Intravenous Q4H PRN         midazolam (VERSED) drip - ADULT 100 mg/100 mL in NS PRE-MIX  1-8 mg/hr Intravenous Continuous 4 mL/hr at 09/11/19 1215 4 mg/hr at 09/11/19 1215     midazolam (VERSED) injection 1-3 mg  1-3 mg Intravenous Q1H PRN   2 mg at 09/11/19 1109     multivitamins w/minerals (CERTAVITE) liquid 15 mL  15 mL Per Feeding Tube Daily   15 mL at 09/11/19 0910     naloxone (NARCAN) injection 0.1-0.4 mg  0.1-0.4 mg Intravenous Q2 Min PRN         nitroPRUsside (NIPRIDE) 50 mg, sodium thiosulfate 500 mg in D5W 125 mL IV infusion (conc: 0.4mg/mL)  0.25-5 mcg/kg/min Intravenous Continuous         No IM injections   Does not apply Continuous PRN         norepinephrine (LEVOPHED) 16 mg in  mL infusion  0.03-0.4 mcg/kg/min Intravenous Continuous 15.1 mL/hr at 09/11/19 1200 0.09 mcg/kg/min at 09/11/19 1200     pantoprazole (PROTONIX) 40 mg IV push injection  40 mg Intravenous Daily   40 mg at 09/11/19 0910     Patient is already receiving anticoagulation with heparin, enoxaparin (LOVENOX), warfarin (COUMADIN)  or other anticoagulant medication   Does not apply Continuous PRN         phenylephrine (JOHN-SYNEPHRINE) 50 mg in NaCl 0.9 % 250 mL infusion  0.5-6 mcg/kg/min Intravenous Continuous         piperacillin-tazobactam (ZOSYN) 3.375 g vial to attach to  mL bag  3.375 g Intravenous Q6H   3.375 g at 09/11/19 1023     potassium chloride (KLOR-CON) Packet 20-40 mEq  20-40 mEq Oral or Feeding Tube Q2H PRN         potassium chloride 10 mEq in 100 mL intermittent infusion with 10 mg lidocaine  10 mEq Intravenous Q1H PRN         potassium chloride 10 mEq in 100 mL sterile water intermittent infusion (premix)  10 mEq Intravenous Q1H PRN         potassium chloride 20 mEq in 50 mL intermittent infusion  20 mEq  Intravenous Q1H PRN   20 mEq at 09/10/19 1117     potassium chloride 20 mEq in 50 mL intermittent infusion  20 mEq Intravenous Q1H PRN         potassium chloride ER (K-DUR/KLOR-CON M) CR tablet 20-40 mEq  20-40 mEq Oral Q2H PRN         propofol (DIPRIVAN) infusion  5-75 mcg/kg/min Intravenous Continuous   Stopped at 09/11/19 0940     sodium chloride (PF) 0.9% PF flush 3 mL  3 mL Intracatheter q1 min prn         sodium chloride (PF) 0.9% PF flush 3 mL  3 mL Intracatheter Q8H   3 mL at 09/11/19 0609     sodium chloride 0.9% infusion   Intravenous Continuous   Stopped at 09/10/19 2005     sodium phosphate 10 mmol in D5W intermittent infusion  10 mmol Intravenous Daily PRN         sodium phosphate 15 mmol in D5W intermittent infusion  15 mmol Intravenous Daily PRN         sodium phosphate 20 mmol in D5W intermittent infusion  20 mmol Intravenous Q6H PRN         sodium phosphate 25 mmol in D5W intermittent infusion  25 mmol Intravenous Q8H PRN         vancomycin place zelaya - receiving intermittent dosing  1 each Intravenous See Admin Instructions         vasopressin (VASOSTRICT) 40 Units in D5W 40 mL infusion  0.5-4 Units/hr Intravenous Continuous   Stopped at 09/10/19 1300     vitamin B1 (THIAMINE) tablet 100 mg  100 mg Oral or Feeding Tube Daily   100 mg at 09/11/19 0910     No current [facility]-ordered outpatient medications on file.             Neurology Focused Physical Exam :     Patient heavily sedated with fentanyl, propofol an midazolam at the time of exam. Intubated and mechanically ventilated. Unresponsive and does not open eyes even to noxious stimulation. Pupils equally reactive, symmetrical and midposition. Oculocephalic reflex present. Corneal, gag and cough reflexes not present. No response elicited to peripheral noxious stimuli.          Data:     Results for orders placed or performed during the hospital encounter of 09/09/19 (from the past 24 hour(s))   Fibrinogen activity   Result Value Ref Range     Fibrinogen 86 (LL) 200 - 420 mg/dL   CBC with platelets   Result Value Ref Range    WBC 21.2 (H) 4.0 - 11.0 10e9/L    RBC Count 4.21 3.8 - 5.2 10e12/L    Hemoglobin 12.3 11.7 - 15.7 g/dL    Hematocrit 38.2 35.0 - 47.0 %    MCV 91 78 - 100 fl    MCH 29.2 26.5 - 33.0 pg    MCHC 32.2 31.5 - 36.5 g/dL    RDW 15.1 (H) 10.0 - 15.0 %    Platelet Count 157 150 - 450 10e9/L   Blood gas arterial and oxyhgb   Result Value Ref Range    pH Arterial 7.43 7.35 - 7.45 pH    pCO2 Arterial 36 35 - 45 mm Hg    pO2 Arterial 87 80 - 105 mm Hg    Bicarbonate Arterial 23 21 - 28 mmol/L    FIO2 45.0     Oxyhemoglobin Arterial 96 92 - 100 %    Base Deficit Art 0.6 mmol/L   Comprehensive metabolic panel   Result Value Ref Range    Sodium 148 (H) 133 - 144 mmol/L    Potassium 4.1 3.4 - 5.3 mmol/L    Chloride 113 (H) 94 - 109 mmol/L    Carbon Dioxide 24 20 - 32 mmol/L    Anion Gap 11 3 - 14 mmol/L    Glucose 85 70 - 99 mg/dL    Urea Nitrogen 31 (H) 7 - 30 mg/dL    Creatinine 1.58 (H) 0.52 - 1.04 mg/dL    GFR Estimate 36 (L) >60 mL/min/[1.73_m2]    GFR Estimate If Black 41 (L) >60 mL/min/[1.73_m2]    Calcium 7.9 (L) 8.5 - 10.1 mg/dL    Bilirubin Total 0.7 0.2 - 1.3 mg/dL    Albumin 2.1 (L) 3.4 - 5.0 g/dL    Protein Total 5.2 (L) 6.8 - 8.8 g/dL    Alkaline Phosphatase 59 40 - 150 U/L     (H) 0 - 50 U/L     (H) 0 - 45 U/L   Calcium ionized whole blood   Result Value Ref Range    Calcium Ionized Whole Blood 4.4 4.4 - 5.2 mg/dL   Glucose whole blood   Result Value Ref Range    Glucose 84 70 - 99 mg/dL   Heparin 10a Level   Result Value Ref Range    Heparin 10A Level 0.11 IU/mL   INR   Result Value Ref Range    INR 1.64 (H) 0.86 - 1.14   Partial thromboplastin time   Result Value Ref Range    PTT 52 (H) 22 - 37 sec   Lactic acid whole blood   Result Value Ref Range    Lactic Acid 1.9 0.7 - 2.0 mmol/L   Magnesium   Result Value Ref Range    Magnesium 2.3 1.6 - 2.3 mg/dL   Troponin I   Result Value Ref Range    Troponin I ES 0.626 (HH)  0.000 - 0.045 ug/L   D dimer quantitative   Result Value Ref Range    D Dimer >20.0 (H) 0.0 - 0.50 ug/ml FEU   Blood gas ELS venous   Result Value Ref Range    pH ELS Domenic 7.34 7.32 - 7.43 pH    pCO2 ELS domenic 48 40 - 50 mm Hg    pO2 ELS Domenic 51 (H) 25 - 47 mm Hg    Bicarbonate ELS Venous 26 21 - 28 mmol/L    Base Deficit Venous 0.2 mmol/L    Oxyhemoglobin ELS V 78 %   Blood gas ELS arterial   Result Value Ref Range    pH ELS Art 7.36 7.35 - 7.45 pH    pCO2  ELS Art 45 35 - 45 mm Hg    pO2 ELS Art 199 (H) 80 - 105 mm Hg    Bicarbonate ELS Art 26 21 - 28 mmol/L    Base Excess Art 0.0 mmol/L    Oxyhemoglobin  ELS A 98 75 - 100 %   Phosphorus   Result Value Ref Range    Phosphorus 4.0 2.5 - 4.5 mg/dL   Cryoprecipitate prepare order unit   Result Value Ref Range    Blood Component Type Cryoprecipitate     Units Ordered 5    Blood component   Result Value Ref Range    Unit Number [id number]     Blood Component Type 5 cryoprecipitate units pooled     Division Number 00     Status of Unit Released to care unit 09/10/2019 1832     Blood Product Code O8575A18     Unit Status ISS    Blood gas arterial and oxyhgb   Result Value Ref Range    pH Arterial 7.44 7.35 - 7.45 pH    pCO2 Arterial 34 (L) 35 - 45 mm Hg    pO2 Arterial 107 (H) 80 - 105 mm Hg    Bicarbonate Arterial 23 21 - 28 mmol/L    FIO2 45.0     Oxyhemoglobin Arterial 97 92 - 100 %    Base Deficit Art 1.0 mmol/L   Blood gas arterial and oxyhgb   Result Value Ref Range    pH Arterial 7.43 7.35 - 7.45 pH    pCO2 Arterial 35 35 - 45 mm Hg    pO2 Arterial 95 80 - 105 mm Hg    Bicarbonate Arterial 23 21 - 28 mmol/L    FIO2 45.0     Oxyhemoglobin Arterial 96 92 - 100 %    Base Deficit Art 0.5 mmol/L   Blood gas venous with oxyhemoglobin (PCU Collect TBD)   Result Value Ref Range    Ph Venous 7.40 7.32 - 7.43 pH    PCO2 Venous 41 40 - 50 mm Hg    PO2 Venous 61 (H) 25 - 47 mm Hg    Bicarbonate Venous 25 21 - 28 mmol/L    FIO2 45.0     Oxyhemoglobin Venous 89 %    Base Excess  Venous 0.1 mmol/L   Blood gas arterial and oxyhgb   Result Value Ref Range    pH Arterial 7.40 7.35 - 7.45 pH    pCO2 Arterial 40 35 - 45 mm Hg    pO2 Arterial 94 80 - 105 mm Hg    Bicarbonate Arterial 24 21 - 28 mmol/L    FIO2 45.0     Oxyhemoglobin Arterial 96 92 - 100 %    Base Deficit Art 0.6 mmol/L   CBC with platelets   Result Value Ref Range    WBC 25.1 (H) 4.0 - 11.0 10e9/L    RBC Count 4.02 3.8 - 5.2 10e12/L    Hemoglobin 11.7 11.7 - 15.7 g/dL    Hematocrit 36.8 35.0 - 47.0 %    MCV 92 78 - 100 fl    MCH 29.1 26.5 - 33.0 pg    MCHC 31.8 31.5 - 36.5 g/dL    RDW 15.1 (H) 10.0 - 15.0 %    Platelet Count 140 (L) 150 - 450 10e9/L   Comprehensive metabolic panel   Result Value Ref Range    Sodium 147 (H) 133 - 144 mmol/L    Potassium 4.6 3.4 - 5.3 mmol/L    Chloride 113 (H) 94 - 109 mmol/L    Carbon Dioxide 24 20 - 32 mmol/L    Anion Gap 10 3 - 14 mmol/L    Glucose 110 (H) 70 - 99 mg/dL    Urea Nitrogen 34 (H) 7 - 30 mg/dL    Creatinine 1.63 (H) 0.52 - 1.04 mg/dL    GFR Estimate 34 (L) >60 mL/min/[1.73_m2]    GFR Estimate If Black 40 (L) >60 mL/min/[1.73_m2]    Calcium 8.4 (L) 8.5 - 10.1 mg/dL    Bilirubin Total 0.5 0.2 - 1.3 mg/dL    Albumin 2.2 (L) 3.4 - 5.0 g/dL    Protein Total 5.3 (L) 6.8 - 8.8 g/dL    Alkaline Phosphatase 55 40 - 150 U/L     (H) 0 - 50 U/L     (H) 0 - 45 U/L   Heparin 10a Level   Result Value Ref Range    Heparin 10A Level 0.20 IU/mL   INR   Result Value Ref Range    INR 1.46 (H) 0.86 - 1.14   Partial thromboplastin time   Result Value Ref Range    PTT 57 (H) 22 - 37 sec   Magnesium   Result Value Ref Range    Magnesium 2.3 1.6 - 2.3 mg/dL   Troponin I   Result Value Ref Range    Troponin I ES 0.335 (HH) 0.000 - 0.045 ug/L   Glucose whole blood   Result Value Ref Range    Glucose 112 (H) 70 - 99 mg/dL   Calcium ionized whole blood   Result Value Ref Range    Calcium Ionized Whole Blood 4.8 4.4 - 5.2 mg/dL   Lactic acid whole blood   Result Value Ref Range    Lactic Acid 1.3  0.7 - 2.0 mmol/L   Fibrinogen activity   Result Value Ref Range    Fibrinogen 164 (L) 200 - 420 mg/dL   Blood gas arterial and oxyhgb   Result Value Ref Range    pH Arterial 7.40 7.35 - 7.45 pH    pCO2 Arterial 39 35 - 45 mm Hg    pO2 Arterial 103 80 - 105 mm Hg    Bicarbonate Arterial 24 21 - 28 mmol/L    FIO2 45.0     Oxyhemoglobin Arterial 96 92 - 100 %    Base Deficit Art 0.6 mmol/L   XR Chest Port 1 View    Narrative    EXAM: XR CHEST PORT 1 VW  9/11/2019 1:15 AM      HISTORY: Check endotracheal tube placement and ECLS cannula placement.  DO NOT log-roll patient.  Place film under patient using patient  safety handling process.    COMPARISON: 9/10/2019    FINDINGS: Supine radiograph of the chest. Endotracheal tube tip  projects over the midthoracic trachea. Enteric tubes course beyond the  field-of-view, Aspen tube sidehole projects at the level of the  gastroesophageal junction. Right IJ central line tip projects over the  mid SVC. Temperature probe tip projects above the level of the  thoracic inlet. Unchanged inferior approach ECMO cannulation. Removal  of the inferior approach bilateral pulmonary arterial lines.    The trachea is midline. The cardiac silhouette is enlarged. Small left  pleural effusion. Unchanged dense left retrocardiac opacity. No  pneumothorax. Upper abdomen is unremarkable.       Impression    IMPRESSION:   1. Unchanged small left pleural effusion with dense left retrocardiac  atelectasis/consolidation.  2. Presumed multichamber cardiomegaly.  3. Interval removal of inferior approach cardiac electronic device.    I have personally reviewed the examination and initial interpretation  and I agree with the findings.    LAURA ESCOBEDO MD   XR Abdomen Port 1 View    Narrative    EXAM: XR ABDOMEN PORT 1 VW  9/11/2019 1:15 AM      HISTORY: OG placement    COMPARISON: 9/10/2019    FINDINGS: Supine radiograph of the abdomen. Gastric tube tip projects  over the stomach, sidehole projects at the  level of the  gastroesophageal junction. Feeding tube is not fully included in the  field-of-view, tip partially visualized at the inferior margins of the  study. Inferior approach ECMO cannulation tip projects over the  inferior cavoatrial junction. Partially visualized catheter tip  projects over the low SVC. Kidneys are partially visualized and dense  in appearance, presumably retained contrast from prior angiography.  Relative paucity of bowel gas. Small left pleural effusion with left  basilar opacities.       Impression    IMPRESSION: Gastric tube sidehole projects above the level of the  gastroesophageal junction. Recommend advancing 5 - 10 cm.    I have personally reviewed the examination and initial interpretation  and I agree with the findings.    LAURA ESCOBEDO MD   Blood gas arterial and oxyhgb   Result Value Ref Range    pH Arterial 7.37 7.35 - 7.45 pH    pCO2 Arterial 44 35 - 45 mm Hg    pO2 Arterial 105 80 - 105 mm Hg    Bicarbonate Arterial 25 21 - 28 mmol/L    FIO2 45.0     Oxyhemoglobin Arterial 97 92 - 100 %    Base Deficit Art 0.5 mmol/L   Blood culture   Result Value Ref Range    Specimen Description Blood Right Hand     Special Requests Aerobic and anaerobic bottles received     Culture Micro No growth after 10 hours    INR   Result Value Ref Range    INR 1.38 (H) 0.86 - 1.14   Heparin 10a Level   Result Value Ref Range    Heparin 10A Level 0.20 IU/mL   Fibrinogen activity   Result Value Ref Range    Fibrinogen 223 200 - 420 mg/dL   CBC with platelets   Result Value Ref Range    WBC 29.0 (H) 4.0 - 11.0 10e9/L    RBC Count 3.86 3.8 - 5.2 10e12/L    Hemoglobin 11.1 (L) 11.7 - 15.7 g/dL    Hematocrit 35.9 35.0 - 47.0 %    MCV 93 78 - 100 fl    MCH 28.8 26.5 - 33.0 pg    MCHC 30.9 (L) 31.5 - 36.5 g/dL    RDW 15.7 (H) 10.0 - 15.0 %    Platelet Count 143 (L) 150 - 450 10e9/L   Vancomycin level   Result Value Ref Range    Vancomycin Level 17.2 mg/L   Procalcitonin   Result Value Ref Range     Procalcitonin 13.15 (HH) ng/ml   Comprehensive metabolic panel   Result Value Ref Range    Sodium 146 (H) 133 - 144 mmol/L    Potassium 4.9 3.4 - 5.3 mmol/L    Chloride 114 (H) 94 - 109 mmol/L    Carbon Dioxide 25 20 - 32 mmol/L    Anion Gap 7 3 - 14 mmol/L    Glucose 138 (H) 70 - 99 mg/dL    Urea Nitrogen 35 (H) 7 - 30 mg/dL    Creatinine 1.82 (H) 0.52 - 1.04 mg/dL    GFR Estimate 30 (L) >60 mL/min/[1.73_m2]    GFR Estimate If Black 35 (L) >60 mL/min/[1.73_m2]    Calcium 8.2 (L) 8.5 - 10.1 mg/dL    Bilirubin Total 0.6 0.2 - 1.3 mg/dL    Albumin 2.2 (L) 3.4 - 5.0 g/dL    Protein Total 5.2 (L) 6.8 - 8.8 g/dL    Alkaline Phosphatase 56 40 - 150 U/L     (H) 0 - 50 U/L    AST 79 (H) 0 - 45 U/L   Partial thromboplastin time   Result Value Ref Range    PTT 49 (H) 22 - 37 sec   Magnesium   Result Value Ref Range    Magnesium 2.4 (H) 1.6 - 2.3 mg/dL   Troponin I   Result Value Ref Range    Troponin I ES 0.213 (HH) 0.000 - 0.045 ug/L   D dimer quantitative   Result Value Ref Range    D Dimer >20.0 (H) 0.0 - 0.50 ug/ml FEU   Blood gas ELS venous   Result Value Ref Range    pH ELS Domenic 7.34 7.32 - 7.43 pH    pCO2 ELS domenic 50 40 - 50 mm Hg    pO2 ELS Domenic 45 25 - 47 mm Hg    Bicarbonate ELS Venous 27 21 - 28 mmol/L    Base Excess Venous 0.4 mmol/L    Oxyhemoglobin ELS V 75 %   Blood gas ELS arterial   Result Value Ref Range    pH ELS Art 7.35 7.35 - 7.45 pH    pCO2  ELS Art 46 (H) 35 - 45 mm Hg    pO2 ELS Art 87 80 - 105 mm Hg    Bicarbonate ELS Art 26 21 - 28 mmol/L    Base Deficit Art 0.3 mmol/L    Oxyhemoglobin  ELS A 95 75 - 100 %   Antithrombin III   Result Value Ref Range    Antithrombin III Chromogenic 67 (L) 85 - 135 %   Calcium ionized whole blood   Result Value Ref Range    Calcium Ionized Whole Blood 4.9 4.4 - 5.2 mg/dL   CRP inflammation   Result Value Ref Range    CRP Inflammation 97.0 (H) 0.0 - 8.0 mg/L   Erythrocyte sedimentation rate auto   Result Value Ref Range    Sed Rate 11 0 - 30 mm/h   Hemoglobin  plasma   Result Value Ref Range    Hemoglobin Plasma 40 (H) <30 mg/dL   Lactate Dehydrogenase   Result Value Ref Range    Lactate Dehydrogenase 626 (H) 81 - 234 U/L   Phosphorus   Result Value Ref Range    Phosphorus 4.1 2.5 - 4.5 mg/dL   Blood gas arterial and oxyhgb   Result Value Ref Range    pH Arterial 7.39 7.35 - 7.45 pH    pCO2 Arterial 42 35 - 45 mm Hg    pO2 Arterial 107 (H) 80 - 105 mm Hg    Bicarbonate Arterial 25 21 - 28 mmol/L    FIO2 45.0     Oxyhemoglobin Arterial 97 92 - 100 %    Base Excess Art 0.0 mmol/L   Glucose whole blood   Result Value Ref Range    Glucose 137 (H) 70 - 99 mg/dL   Lactic acid whole blood   Result Value Ref Range    Lactic Acid 1.4 0.7 - 2.0 mmol/L   Sputum Culture Aerobic Bacterial   Result Value Ref Range    Specimen Description Sputum Endotracheal     Culture Micro PENDING    Blood gas arterial   Result Value Ref Range    pH Arterial 7.40 7.35 - 7.45 pH    pCO2 Arterial 41 35 - 45 mm Hg    pO2 Arterial 100 80 - 105 mm Hg    Bicarbonate Arterial 26 21 - 28 mmol/L    Base Excess Art 0.6 mmol/L    FIO2 45.0    Blood gas arterial and oxyhgb   Result Value Ref Range    pH Arterial 7.37 7.35 - 7.45 pH    pCO2 Arterial 44 35 - 45 mm Hg    pO2 Arterial 101 80 - 105 mm Hg    Bicarbonate Arterial 26 21 - 28 mmol/L    FIO2 45.0     Oxyhemoglobin Arterial 96 92 - 100 %    Base Excess Art 0.0 mmol/L   XR Chest Port 1 View    Narrative    XR CHEST PORT 1 VW  9/11/2019 8:44 AM      HISTORY: ECMO and intubated    COMPARISON: Same-day chest radiograph    FINDINGS:   Single AP radiograph of the chest. Underpenetrated study. Endotracheal  tube tip is positioned over the mid thoracic trachea. Right IJ central  line with tip overlying the mid to low SVC. Enteric and feeding tubes  course beyond the field-of-view, with the enteric tube sidehole  projecting over the left upper quadrant. Inferior approach ECMO  catheter overlying the right atrium. Unchanged cardiomegaly. Lung  volumes are similar  with unchanged diffuse mixed interstitial and  patchy airspace opacities. New left upper lobe airspace consolidation.  Unchanged retrocardiac opacities. Unchanged small left pleural  effusion. No pneumothoraces.      Impression    IMPRESSION:   1. New left upper lobe airspace consolidation  2. Unchanged left basilar opacities and pleural effusion.  3. Stable support devices.    I have personally reviewed the examination and initial interpretation  and I agree with the findings.    DANY GIRALDO MD   Blood gas arterial and oxyhgb   Result Value Ref Range    pH Arterial 7.40 7.35 - 7.45 pH    pCO2 Arterial 41 35 - 45 mm Hg    pO2 Arterial 118 (H) 80 - 105 mm Hg    Bicarbonate Arterial 26 21 - 28 mmol/L    FIO2 45     Oxyhemoglobin Arterial 98 92 - 100 %    Base Excess Art 0.9 mmol/L   Blood gas arterial and oxyhgb   Result Value Ref Range    pH Arterial 7.37 7.35 - 7.45 pH    pCO2 Arterial 45 35 - 45 mm Hg    pO2 Arterial 107 (H) 80 - 105 mm Hg    Bicarbonate Arterial 26 21 - 28 mmol/L    FIO2 45     Oxyhemoglobin Arterial 97 92 - 100 %    Base Deficit Art 0.1 mmol/L   CBC with platelets   Result Value Ref Range    WBC 28.5 (H) 4.0 - 11.0 10e9/L    RBC Count 3.51 (L) 3.8 - 5.2 10e12/L    Hemoglobin 10.3 (L) 11.7 - 15.7 g/dL    Hematocrit 33.0 (L) 35.0 - 47.0 %    MCV 94 78 - 100 fl    MCH 29.3 26.5 - 33.0 pg    MCHC 31.2 (L) 31.5 - 36.5 g/dL    RDW 15.7 (H) 10.0 - 15.0 %    Platelet Count 127 (L) 150 - 450 10e9/L   Comprehensive metabolic panel   Result Value Ref Range    Sodium 148 (H) 133 - 144 mmol/L    Potassium 4.4 3.4 - 5.3 mmol/L    Chloride 114 (H) 94 - 109 mmol/L    Carbon Dioxide 26 20 - 32 mmol/L    Anion Gap 9 3 - 14 mmol/L    Glucose 169 (H) 70 - 99 mg/dL    Urea Nitrogen 45 (H) 7 - 30 mg/dL    Creatinine 1.67 (H) 0.52 - 1.04 mg/dL    GFR Estimate 33 (L) >60 mL/min/[1.73_m2]    GFR Estimate If Black 39 (L) >60 mL/min/[1.73_m2]    Calcium 8.2 (L) 8.5 - 10.1 mg/dL    Bilirubin Total 0.5 0.2 - 1.3 mg/dL     Albumin 2.1 (L) 3.4 - 5.0 g/dL    Protein Total 5.0 (L) 6.8 - 8.8 g/dL    Alkaline Phosphatase 51 40 - 150 U/L     (H) 0 - 50 U/L    AST 62 (H) 0 - 45 U/L   Calcium ionized whole blood   Result Value Ref Range    Calcium Ionized Whole Blood 4.9 4.4 - 5.2 mg/dL   Glucose whole blood   Result Value Ref Range    Glucose 167 (H) 70 - 99 mg/dL   Heparin 10a Level   Result Value Ref Range    Heparin 10A Level 0.26 IU/mL   Partial thromboplastin time   Result Value Ref Range    PTT 64 (H) 22 - 37 sec   Lactic acid whole blood   Result Value Ref Range    Lactic Acid 1.4 0.7 - 2.0 mmol/L   Magnesium   Result Value Ref Range    Magnesium 2.4 (H) 1.6 - 2.3 mg/dL   Troponin I   Result Value Ref Range    Troponin I ES 0.158 (HH) 0.000 - 0.045 ug/L   Echo Limited    Narrative    [id number]  [id number]  [id number]  [id number]^REY MULLER^BEKA           [facility],[place]  Echocardiography Laboratory  [address] [place] [id number]     Name: XAVIER ROWELL  MRN: [id number]  : 1960  Study Date: 2019 11:06 AM  Age: 59 yrs  Gender: Female  Patient Location: [facility]  Reason For Study: Pulmonary Emboli  Ordering Physician: BEKA VINCENT  Referring Physician: BRAD DC  Performed By: Sudarshan Schrader     BSA: 2.6 m2  Height: 65 in  Weight: 396 lb  BP: 113/63 mmHg  _____________________________________________________________________________  __        Procedure  Limited Portable Echo Adult. ECMO turndown.  _____________________________________________________________________________  __        Interpretation Summary  VA ECMO turndown from 3.8LPM to 2 LPM.  LV EF improved to 35 to 40%.  Unable to accurately assess RV function.  Improved LV function since previous study.  _____________________________________________________________________________  __        Left Ventricle  VA ECMO turndown from 3.8LPM to 2 LPM.  LV EF improved to 35 to 40%.  Unable to  accurately assess RV function.     Vessels  Dilation of the inferior vena cava is present with abnormal respiratory  variation in diameter.     Pericardium  No pericardial effusion is present.     _____________________________________________________________________________  __                       Report approved by: Tariq Lao 09/11/2019 01:28 PM                 _____________________________________________________________________________  __      Blood gas arterial and oxyhgb   Result Value Ref Range    pH Arterial 7.41 7.35 - 7.45 pH    pCO2 Arterial 42 35 - 45 mm Hg    pO2 Arterial 102 80 - 105 mm Hg    Bicarbonate Arterial 27 21 - 28 mmol/L    FIO2 15     Oxyhemoglobin Arterial 97 92 - 100 %    Base Excess Art 1.9 mmol/L     No new brain imaging       Brooklynn Wright, MS4  [employer]

## 2019-09-11 NOTE — PROGRESS NOTES
SPIRITUAL HEALTH SERVICES  SPIRITUAL ASSESSMENT Progress Note (Palliative Focus)  [facility] ([place]) 4E    REFERRAL SOURCE: Palliative care initial spiritual assessment.    Attempted visits times two attempts yesterday and today; however, sisters of patient Shira Rodriguez were not present on either attempt. Per RN, sisters visit mainly in the evenings.     Plan: I will attempt to follow to assess spiritual support needs while Palliative Care is consulted.    Hui Sutherland  Palliative   Pager [phone]  [facility] Inpatient Team Consult pager [phone] (M-F 8-4:30)  After-hours Answering Service [phone]

## 2019-09-11 NOTE — PHARMACY-VANCOMYCIN DOSING SERVICE
Pharmacy Vancomycin Note  Date of Service 2019  Patient's  1960   59 year old, female    Indication: ECMO prophylaxis / aspiration PNA  Goal Trough Level: 15-20 mg/L  Day of Therapy: 2  Current Vancomycin regimen: intermittent dosing, s/p 2500mg IV load (as 2 separate doses)    Current estimated CrCl = Estimated Creatinine Clearance: 62.4 mL/min (A) (based on SCr of 1.67 mg/dL (H)).    Creatinine for last 3 days  2019:  4:52 PM Creatinine 1.28 mg/dL  9/10/2019:  2:35 AM Creatinine 1.76 mg/dL;  4:40 AM Creatinine 1.77 mg/dL; 10:11 AM Creatinine 1.67 mg/dL;  3:47 PM Creatinine 1.58 mg/dL; 10:06 PM Creatinine 1.63 mg/dL  2019:  3:46 AM Creatinine 1.82 mg/dL; 10:05 AM Creatinine 1.67 mg/dL    Recent Vancomycin Levels (past 3 days)  2019:  3:46 AM Vancomycin Level 17.2 mg/L (post load, NOT 24 hour level)    Vancomycin IV Administrations (past 72 hours)                   vancomycin (VANCOCIN) 1000 mg in dextrose 5% 200 mL PREMIX (mg) 1,000 mg New Bag 09/10/19 1609    vancomycin 1500 mg in 0.9% NaCl 250 ml intermittent infusion 1,500 mg (mg) 1,500 mg Given 09/10/19 0750                Nephrotoxins and other renal medications (From now, onward)    Start     Dose/Rate Route Frequency Ordered Stop    19 1600  vancomycin (VANCOCIN) 2,000 mg in sodium chloride 0.9 % 500 mL intermittent infusion      2,000 mg  over 2 Hours Intravenous ONCE 19 1314      09/10/19 0659  vancomycin place zelaya - receiving intermittent dosing      1 each Intravenous SEE ADMIN INSTRUCTIONS 09/10/19 0659      09/10/19 0445  piperacillin-tazobactam (ZOSYN) 3.375 g vial to attach to  mL bag      3.375 g  over 30 Minutes Intravenous EVERY 6 HOURS 19 0907 09/15/19 0444    19 224  norepinephrine (LEVOPHED) 16 mg in  mL infusion      0.03-0.4 mcg/kg/min × 178.6 kg  5-67 mL/hr  Intravenous CONTINUOUS 198      19 2248  vasopressin (VASOSTRICT) 40 Units in D5W 40 mL  infusion      0.5-4 Units/hr  0.5-4 mL/hr  Intravenous CONTINUOUS 09/09/19 2238               Contrast Orders - past 72 hours (72h ago, onward)    Start     Dose/Rate Route Frequency Ordered Stop    09/09/19 2345  iodixanol (VISIPAQUE 320) injection 150 mL      150 mL INTRA-ARTERIAL ONCE 09/09/19 2340 09/10/19 0317          Interpretation of levels and current regimen:  Trough level is  Therapeutic post one dose    Has serum creatinine changed > 50% in last 72 hours: Yes, elevated from baseline SCr 0.6-0.7    Urine output:  good urine output    Renal Function: Worsening, but SCr has plateaued    Plan:  1.  Continue intermittent dosing, will dose 2000mg IV x1 at 1600 today. consider scheduled dosing if Scr stable/improved on 9/12  2.  Pharmacy will check trough levels as appropriate in 1-3 Days.    3. Serum creatinine levels will be ordered daily for the first week of therapy and at least twice weekly for subsequent weeks.      Cheryl Acosta RPH        .

## 2019-09-11 NOTE — PROGRESS NOTES
ECMO Shift Summary:    Patient remains on VA ECMO, all equipment is functioning and alarms are appropriately set. RPM's 3650 with flow range 3.36-3.98 L/min. Sweep gas is at 2.5 LPM and FiO2 50%. Circuit remains free of air, clot and fibrin. Cannulas are secure with no bleeding from site. Extremities are warm. Suctioned ETT for scant sami thick secretions.    Significant Shift Events: Pt had a turndown today, see MDs notes.    Vent settings:  Ventilation Mode: CMV/AC  (Continuous Mandatory Ventilation/ Assist Control)  FiO2 (%): 45 %  Rate Set (breaths/minute): 12 breaths/min  Tidal Volume Set (mL): 450 mL  PEEP (cm H2O): 8 cmH2O  Oxygen Concentration (%): 45 %  Resp: 13  .    Heparin is running at 2100 u/hr, ACT range 158-162.    Urine output is as documemented, blood loss was none. Product given included none.      Intake/Output Summary (Last 24 hours) at 2019 1801  Last data filed at 2019 1800  Gross per 24 hour   Intake 4092.11 ml   Output 3806 ml   Net 286.11 ml       ECHO:  No results found for this or any previous visit.  Results for orders placed in visit on 18   Echocardiogram Complete    Narrative [id number]  [id number]  [id number]  [id number]^CARRINGTON^VINOD^WILLIS           [facility]  Diagnostic and [facility]-3rd Floor  [address] [place] [address]     Name: XAVIER ROWELL  MRN: [id number]  : 1960  Study Date: 2018 05:59 PM  Age: 58 yrs  Gender: Female  [place] [facility] [place]  Reason For Study: Cough, Edema extremities  Ordering Physician: VINOD SHULTZ  Referring Physician: VINOD SHULTZ  Performed By: Ryley Ridley RDCS     BSA: 2.7 m2  Height: 66 in  Weight: 418 lb  HR: 85  BP: 148/77 mmHg  _____________________________________________________________________________  __        Procedure  Echocardiogram with two-dimensional, color and spectral Doppler  performed.  _____________________________________________________________________________  __        Interpretation Summary  Moderate concentric wall thickening consistent with left ventricular  hypertrophy is present.  Biventricular size and systolic function is normal.The LVEF is 55-60%.  No significant valve disease.  Inferior vena cava appers dilated measuring 2.4 cm  _____________________________________________________________________________  __        Left Ventricle  Left ventricular size is normal. Left ventricular function is normal.The EF is  55-60%. Moderate concentric wall thickening consistent with left ventricular  hypertrophy is present. Left ventricular diastolic function is indeterminate.  Regional wall motion is probably normal. Endocardial border definition  suboptimal and contrast not given.     Right Ventricle  The right ventricle is normal size. Global right ventricular function is  normal.     Atria  Both atria appear normal.     Mitral Valve  Mild mitral annular calcification is present. Trace mitral insufficiency is  present.        Aortic Valve  Mild aortic valve sclerosis is present. No aortic regurgitation is present.  Transvalvular Doppler is normal and without signs of signifcant stenosis.     Tricuspid Valve  The peak velocity of the tricuspid regurgitant jet is not obtainable.  Pulmonary artery systolic pressure cannot be assessed.     Pulmonic Valve  The pulmonic valve cannot be assessed.     Vessels  The aorta root is normal. Inferior vena cava appers dilated measuring 2.4 cm.  Ascending aorta 3.3 cm.     Pericardium  No pericardial effusion is present.     _____________________________________________________________________________  __  MMode/2D Measurements & Calculations  IVSd: 1.5 cm  LVIDd: 5.4 cm  LVIDs: 3.7 cm  LVPWd: 1.3 cm  FS: 30.5 %  LV mass(C)d: 323.5 grams  LV mass(C)dI: 118.2 grams/m2     asc Aorta Diam: 3.3 cm  LVOT diam: 2.2 cm  LVOT area: 4.0 cm2  LA Volume (BP):  71.6 ml  LA Volume Index (BP): 26.1 ml/m2  RWT: 0.47        Doppler Measurements & Calculations  MV E max emile: 67.4 cm/sec  MV A max emile: 88.8 cm/sec  MV E/A: 0.76  MV dec time: 0.17 sec  Ao V2 max: 203.2 cm/sec  Ao max P.0 mmHg  MERON(V,D): 2.3 cm2     LV V1 max P.5 mmHg  LV V1 max: 116.8 cm/sec  AV Emile Ratio (DI): 0.57  E/E' av.0  Lateral E/e': 13.4  Medial E/e': 12.6     _____________________________________________________________________________  __           Report approved by: Tariq Pappas 2018 10:50 AM          CXR:  Recent Results (from the past 24 hour(s))   XR Chest Port 1 View    Narrative    EXAM: XR CHEST PORT 1 VW  2019 1:15 AM      HISTORY: Check endotracheal tube placement and ECLS cannula placement.  DO NOT log-roll patient.  Place film under patient using patient  safety handling process.    COMPARISON: 9/10/2019    FINDINGS: Supine radiograph of the chest. Endotracheal tube tip  projects over the midthoracic trachea. Enteric tubes course beyond the  field-of-view, Aspen tube sidehole projects at the level of the  gastroesophageal junction. Right IJ central line tip projects over the  mid SVC. Temperature probe tip projects above the level of the  thoracic inlet. Unchanged inferior approach ECMO cannulation. Removal  of the inferior approach bilateral pulmonary arterial lines.    The trachea is midline. The cardiac silhouette is enlarged. Small left  pleural effusion. Unchanged dense left retrocardiac opacity. No  pneumothorax. Upper abdomen is unremarkable.       Impression    IMPRESSION:   1. Unchanged small left pleural effusion with dense left retrocardiac  atelectasis/consolidation.  2. Presumed multichamber cardiomegaly.  3. Interval removal of inferior approach cardiac electronic device.    I have personally reviewed the examination and initial interpretation  and I agree with the findings.    LAURA ESCOBEDO MD   XR Abdomen Port 1 View    Narrative     EXAM: XR ABDOMEN PORT 1 VW  9/11/2019 1:15 AM      HISTORY: OG placement    COMPARISON: 9/10/2019    FINDINGS: Supine radiograph of the abdomen. Gastric tube tip projects  over the stomach, sidehole projects at the level of the  gastroesophageal junction. Feeding tube is not fully included in the  field-of-view, tip partially visualized at the inferior margins of the  study. Inferior approach ECMO cannulation tip projects over the  inferior cavoatrial junction. Partially visualized catheter tip  projects over the low SVC. Kidneys are partially visualized and dense  in appearance, presumably retained contrast from prior angiography.  Relative paucity of bowel gas. Small left pleural effusion with left  basilar opacities.       Impression    IMPRESSION: Gastric tube sidehole projects above the level of the  gastroesophageal junction. Recommend advancing 5 - 10 cm.    I have personally reviewed the examination and initial interpretation  and I agree with the findings.    LAURA ESCOBEDO MD   XR Chest Port 1 View    Narrative    XR CHEST PORT 1 VW  9/11/2019 8:44 AM      HISTORY: ECMO and intubated    COMPARISON: Same-day chest radiograph    FINDINGS:   Single AP radiograph of the chest. Underpenetrated study. Endotracheal  tube tip is positioned over the mid thoracic trachea. Right IJ central  line with tip overlying the mid to low SVC. Enteric and feeding tubes  course beyond the field-of-view, with the enteric tube sidehole  projecting over the left upper quadrant. Inferior approach ECMO  catheter overlying the right atrium. Unchanged cardiomegaly. Lung  volumes are similar with unchanged diffuse mixed interstitial and  patchy airspace opacities. New left upper lobe airspace consolidation.  Unchanged retrocardiac opacities. Unchanged small left pleural  effusion. No pneumothoraces.      Impression    IMPRESSION:   1. New left upper lobe airspace consolidation  2. Unchanged left basilar opacities and pleural  effusion.  3. Stable support devices.    I have personally reviewed the examination and initial interpretation  and I agree with the findings.    DANY GIRALDO MD       Labs:  Recent Labs   Lab 09/11/19  1600 09/11/19  1403 09/11/19  1005 09/11/19  1003   PH 7.40 7.41 7.37 7.40   PCO2 43 42 45 41   PO2 129* 102 107* 118*   HCO3 27 27 26 26   O2PER 45 15 45 45       Lab Results   Component Value Date    HGB 9.8 (L) 09/11/2019    PHGB 40 (H) 09/11/2019     (L) 09/11/2019    FIBR 349 09/11/2019    INR 1.36 (H) 09/11/2019    PTT 64 (H) 09/11/2019    DD >20.0 (H) 09/11/2019    AXA 0.35 09/11/2019    ANTCH 67 (L) 09/11/2019         Plan is to remain on VA ECMO.      Apoorva Heck, RT, RRT  9/11/2019 6:01 PM

## 2019-09-11 NOTE — PROGRESS NOTES
Pt remains on VA ECMO, no complications. Pulmonary artery catheters pulled today after tPA infusions complete. EEG monitoring initiated. Weaned off Vaso. No vent changes, ABGs stable. NJ placed and tube feeds initiated. Family updated at bedside. VSS.

## 2019-09-11 NOTE — PHARMACY
Pharmacy Tube Feeding Consult    Medication reviewed for administration by feeding tube and for potential food/drug interactions.    Recommendation: No changes are needed at this time.     Pharmacy will continue to follow as new medications are ordered.    Luisa Esquivel, QuitaD

## 2019-09-11 NOTE — PLAN OF CARE
S:  Turn down ECHO at bedside. Pt required no blood products or electrolyte replacement this shift.  Pt opens eyes spontaneously, moves all extremities but has not followed commands this shift.  Sedation changed from Propofol to Versed and Dex. Fentanyl PCA for pain.  60mg lasix given.  No oozing from ECMO site.  Unable to achieve goal ACT with standard ECMO Heparin gtt order, so order changed to high intensity order.  TF at goal.  B:  Pt s/p saddle PE with TPA and VA ECMO.  A:  Pt tolerated diuresis with 60mg Lasix. Pt adequately sedated to prevent cannulation complications with current gtts and bolus' for activity. Afebrile. ST with trigeminal and frequent PAC's at start of shift which had variable pulsatility. This afternoon PAC's became rare and pulsatility became normal on ruthy. Pt's cardiac function improving.  P:  Continue supportive care with ECMO. Ensure adequate sedation for pt safety with invasive lines. Possible repeat beside turn down ECHO tomorrow. No OR time/date for decannulation at this time.

## 2019-09-11 NOTE — PROGRESS NOTES
ECMO Shift Summary:    Patient remains on VA ECMO, all equipment is functioning and alarms are appropriately set. RPM's 3650 with flow range 3.96-4.6 L/min. Sweep gas is at 2.5 LPM and FiO2 50%. Circuit remains free of air, clot and fibrin. Cannulas are secure with no bleeding from site. Extremities are warm. Suctioned ETT for thick punk/blood tinged secretions.    Significant Shift Events:   Decreased sweep X1  SvO2 high 90's most of the shift, weaned FiO2 to 50% and calibrated with pre oxy VBG Sv02 of 75.   Heparin gtt increased X2 for ACT's of 150's    Vent settings:  Ventilation Mode: CMV/AC  (Continuous Mandatory Ventilation/ Assist Control)  FiO2 (%): 45 %  Rate Set (breaths/minute): 12 breaths/min  Tidal Volume Set (mL): 450 mL  PEEP (cm H2O): 12 cmH2O  Oxygen Concentration (%): 45 %  Resp: 13  .    Heparin is running at 1700 unit(s)/hr, ACT range 150-163.    Urine output is good, see I/O flowsheet. Blood loss was none. No blood products given.      Intake/Output Summary (Last 24 hours) at 2019 0623  Last data filed at 2019 0610  Gross per 24 hour   Intake 5522.39 ml   Output 1661 ml   Net 3861.39 ml       ECHO:  No results found for this or any previous visit.  Results for orders placed in visit on 18   Echocardiogram Complete    Narrative [id number]  ECH19  [id number]  [id number]^CARRINGTON^VINOD^WILLIS           [facility]  Diagnostic and [employer]-3rd Floor  [address] [place] [id number]     Name: XAVIER ROWELL  MRN: [id number]  : 1960  Study Date: 2018 05:59 PM  Age: 58 yrs  Gender: Female  [place] [facility] [place] For Study: Cough, Edema extremities  Ordering Physician: VINOD SHULTZ  Referring Physician: VINOD SHULTZ  Performed By: Ryely Ridley RDCS     BSA: 2.7 m2  Height: 66 in  Weight: 418 lb  HR: 85  BP: 148/77 mmHg  _____________________________________________________________________________  __         Procedure  Echocardiogram with two-dimensional, color and spectral Doppler performed.  _____________________________________________________________________________  __        Interpretation Summary  Moderate concentric wall thickening consistent with left ventricular  hypertrophy is present.  Biventricular size and systolic function is normal.The LVEF is 55-60%.  No significant valve disease.  Inferior vena cava appers dilated measuring 2.4 cm  _____________________________________________________________________________  __        Left Ventricle  Left ventricular size is normal. Left ventricular function is normal.The EF is  55-60%. Moderate concentric wall thickening consistent with left ventricular  hypertrophy is present. Left ventricular diastolic function is indeterminate.  Regional wall motion is probably normal. Endocardial border definition  suboptimal and contrast not given.     Right Ventricle  The right ventricle is normal size. Global right ventricular function is  normal.     Atria  Both atria appear normal.     Mitral Valve  Mild mitral annular calcification is present. Trace mitral insufficiency is  present.        Aortic Valve  Mild aortic valve sclerosis is present. No aortic regurgitation is present.  Transvalvular Doppler is normal and without signs of signifcant stenosis.     Tricuspid Valve  The peak velocity of the tricuspid regurgitant jet is not obtainable.  Pulmonary artery systolic pressure cannot be assessed.     Pulmonic Valve  The pulmonic valve cannot be assessed.     Vessels  The aorta root is normal. Inferior vena cava appers dilated measuring 2.4 cm.  Ascending aorta 3.3 cm.     Pericardium  No pericardial effusion is present.     _____________________________________________________________________________  __  MMode/2D Measurements & Calculations  IVSd: 1.5 cm  LVIDd: 5.4 cm  LVIDs: 3.7 cm  LVPWd: 1.3 cm  FS: 30.5 %  LV mass(C)d: 323.5 grams  LV mass(C)dI: 118.2 grams/m2      asc Aorta Diam: 3.3 cm  LVOT diam: 2.2 cm  LVOT area: 4.0 cm2  LA Volume (BP): 71.6 ml  LA Volume Index (BP): 26.1 ml/m2  RWT: 0.47        Doppler Measurements & Calculations  MV E max emile: 67.4 cm/sec  MV A max emile: 88.8 cm/sec  MV E/A: 0.76  MV dec time: 0.17 sec  Ao V2 max: 203.2 cm/sec  Ao max P.0 mmHg  MERON(V,D): 2.3 cm2     LV V1 max P.5 mmHg  LV V1 max: 116.8 cm/sec  AV Emile Ratio (DI): 0.57  E/E' av.0  Lateral E/e': 13.4  Medial E/e': 12.6     _____________________________________________________________________________  __           Report approved by: Tariq Pappas 2018 10:50 AM          CXR:  Recent Results (from the past 24 hour(s))   XR Chest Port 1 View    Narrative    EXAM: XR CHEST PORT 1 VW  9/10/2019 6:25 AM      HISTORY: Check endotracheal tube placement and ECLS cannula placement.  DO NOT log-roll patient.  Place film under patient using patient  safety handling process.    COMPARISON: CT 9/10/2019, radiograph 2019    FINDINGS: Supine AP radiograph of the chest. Endotracheal tube tip  projects 3.1 cm from the iain. Right IJ central line tip projects  over the mid SVC. Inferior approach ECMO cannulation tip projects over  the inferior cavoatrial junction. Right and left pulmonary arterial  lines. The trachea is midline. The cardiac silhouette is enlarged.  Small left pleural effusion with dense left basilar opacity, as seen  on previous CT. Patchy right basilar opacities. Upper abdomen is  unremarkable. Known anterior rib fractures are not well demonstrated  on the study.       Impression    IMPRESSION:   1. Endotracheal tube tip projects 3.1 cm from the iain.  2. Small left pleural effusion with dense left basilar  atelectasis/consolidation and patchy right basilar opacities similar  to prior earlier CT.    I have personally reviewed the examination and initial interpretation  and I agree with the findings.    FLOR MCCRAY MD   XR Abdomen Port 1 View     Narrative    Examination:  XR ABDOMEN PORT 1 VW    Date:  9/10/2019 2:44 PM     Clinical Information: feeding tube placement     Comparison: CT-8/10/2019    Findings:     Supine radiographs of the abdomen. Left arterial sheath terminates at  L5. The left venous sheath projects off the film. Right sheath has 2  catheters that course upwards. Feeding tube projects over the proximal  duodenum. Nephrogram secondary to contrast given yesterday, indicative  of kidney disease. Nonobstructive bowel gas pattern. Visualized bowel  is unremarkable without pneumatosis or portal venous gas.        Impression    Impression:  1. Feeding tube projects over the proximal duodenum.   2. Nonobstructive bowel gas pattern.  3. Nephrogram secondary to contrast given yesterday, indicative of  renal failure.    I have personally reviewed the examination and initial interpretation  and I agree with the findings.    DANY GIRALDO MD       Labs:  Recent Labs   Lab 09/11/19  0346 09/11/19  0155 09/11/19  0000 09/10/19  2206   PH 7.39 7.37 7.40 7.40   PCO2 42 44 39 40   PO2 107* 105 103 94   HCO3 25 25 24 24   O2PER 45.0 45.0 45.0 45.0       Lab Results   Component Value Date    HGB 11.1 (L) 09/11/2019    PHGB 40 (H) 09/11/2019     (L) 09/11/2019    FIBR 223 09/11/2019    INR 1.38 (H) 09/11/2019    PTT 49 (H) 09/11/2019    DD >20.0 (H) 09/11/2019    AXA 0.20 09/11/2019    ANTCH 62 (L) 09/10/2019         Plan is to continue ECMO support.      Cheryl Montoya, RRT-NPS  9/11/2019 6:23 AM

## 2019-09-12 ENCOUNTER — APPOINTMENT (OUTPATIENT)
Dept: GENERAL RADIOLOGY | Facility: CLINIC | Age: 59
DRG: 003 | End: 2019-09-12
Attending: INTERNAL MEDICINE
Payer: COMMERCIAL

## 2019-09-12 ENCOUNTER — APPOINTMENT (OUTPATIENT)
Dept: CARDIOLOGY | Facility: CLINIC | Age: 59
DRG: 003 | End: 2019-09-12
Attending: STUDENT IN AN ORGANIZED HEALTH CARE EDUCATION/TRAINING PROGRAM
Payer: COMMERCIAL

## 2019-09-12 LAB
ALBUMIN SERPL-MCNC: 1.9 G/DL (ref 3.4–5)
ALBUMIN SERPL-MCNC: 2 G/DL (ref 3.4–5)
ALBUMIN SERPL-MCNC: 2.1 G/DL (ref 3.4–5)
ALBUMIN SERPL-MCNC: 2.1 G/DL (ref 3.4–5)
ALP SERPL-CCNC: 47 U/L (ref 40–150)
ALP SERPL-CCNC: 48 U/L (ref 40–150)
ALP SERPL-CCNC: 48 U/L (ref 40–150)
ALP SERPL-CCNC: 50 U/L (ref 40–150)
ALT SERPL W P-5'-P-CCNC: 59 U/L (ref 0–50)
ALT SERPL W P-5'-P-CCNC: 64 U/L (ref 0–50)
ALT SERPL W P-5'-P-CCNC: 70 U/L (ref 0–50)
ALT SERPL W P-5'-P-CCNC: 73 U/L (ref 0–50)
ANION GAP SERPL CALCULATED.3IONS-SCNC: 4 MMOL/L (ref 3–14)
ANION GAP SERPL CALCULATED.3IONS-SCNC: 6 MMOL/L (ref 3–14)
ANION GAP SERPL CALCULATED.3IONS-SCNC: 6 MMOL/L (ref 3–14)
ANION GAP SERPL CALCULATED.3IONS-SCNC: 7 MMOL/L (ref 3–14)
APTT PPP: 119 SEC (ref 22–37)
APTT PPP: 92 SEC (ref 22–37)
APTT PPP: 92 SEC (ref 22–37)
APTT PPP: 99 SEC (ref 22–37)
AST SERPL W P-5'-P-CCNC: 27 U/L (ref 0–45)
AST SERPL W P-5'-P-CCNC: 29 U/L (ref 0–45)
AST SERPL W P-5'-P-CCNC: 34 U/L (ref 0–45)
AST SERPL W P-5'-P-CCNC: 35 U/L (ref 0–45)
AT III ACT/NOR PPP CHRO: 62 % (ref 85–135)
BASE EXCESS BLDA CALC-SCNC: 2.9 MMOL/L
BASE EXCESS BLDA CALC-SCNC: 3.5 MMOL/L
BASE EXCESS BLDA CALC-SCNC: 3.7 MMOL/L
BASE EXCESS BLDA CALC-SCNC: 4.2 MMOL/L
BASE EXCESS BLDA CALC-SCNC: 4.3 MMOL/L
BASE EXCESS BLDA CALC-SCNC: 4.3 MMOL/L
BASE EXCESS BLDA CALC-SCNC: 4.9 MMOL/L
BASE EXCESS BLDA CALC-SCNC: 5 MMOL/L
BASE EXCESS BLDA CALC-SCNC: 5.2 MMOL/L
BASE EXCESS BLDA CALC-SCNC: 5.3 MMOL/L
BASE EXCESS BLDA CALC-SCNC: 5.5 MMOL/L
BASE EXCESS BLDA CALC-SCNC: 5.6 MMOL/L
BASE EXCESS BLDA CALC-SCNC: 5.7 MMOL/L
BASE EXCESS BLDA CALC-SCNC: 5.9 MMOL/L
BASE EXCESS BLDV CALC-SCNC: 5.1 MMOL/L
BASE EXCESS BLDV CALC-SCNC: 6.2 MMOL/L
BILIRUB SERPL-MCNC: 0.5 MG/DL (ref 0.2–1.3)
BLD PROD TYP BPU: NORMAL
BLD UNIT ID BPU: 0
BLOOD PRODUCT CODE: NORMAL
BPU ID: NORMAL
BUN SERPL-MCNC: 43 MG/DL (ref 7–30)
BUN SERPL-MCNC: 44 MG/DL (ref 7–30)
BUN SERPL-MCNC: 44 MG/DL (ref 7–30)
BUN SERPL-MCNC: 45 MG/DL (ref 7–30)
CA-I BLD-MCNC: 4.7 MG/DL (ref 4.4–5.2)
CA-I BLD-MCNC: 4.7 MG/DL (ref 4.4–5.2)
CA-I BLD-MCNC: 4.8 MG/DL (ref 4.4–5.2)
CA-I BLD-MCNC: 4.8 MG/DL (ref 4.4–5.2)
CALCIUM SERPL-MCNC: 7.8 MG/DL (ref 8.5–10.1)
CALCIUM SERPL-MCNC: 8 MG/DL (ref 8.5–10.1)
CALCIUM SERPL-MCNC: 8.1 MG/DL (ref 8.5–10.1)
CALCIUM SERPL-MCNC: 8.3 MG/DL (ref 8.5–10.1)
CHLORIDE SERPL-SCNC: 113 MMOL/L (ref 94–109)
CHLORIDE SERPL-SCNC: 116 MMOL/L (ref 94–109)
CO2 SERPL-SCNC: 28 MMOL/L (ref 20–32)
CO2 SERPL-SCNC: 29 MMOL/L (ref 20–32)
CREAT SERPL-MCNC: 1.14 MG/DL (ref 0.52–1.04)
CREAT SERPL-MCNC: 1.25 MG/DL (ref 0.52–1.04)
CREAT SERPL-MCNC: 1.26 MG/DL (ref 0.52–1.04)
CREAT SERPL-MCNC: 1.43 MG/DL (ref 0.52–1.04)
CRP SERPL-MCNC: 91 MG/L (ref 0–8)
D DIMER PPP FEU-MCNC: 9.9 UG/ML FEU (ref 0–0.5)
D DIMER PPP FEU-MCNC: >20 UG/ML FEU (ref 0–0.5)
ERYTHROCYTE [DISTWIDTH] IN BLOOD BY AUTOMATED COUNT: 15.7 % (ref 10–15)
ERYTHROCYTE [DISTWIDTH] IN BLOOD BY AUTOMATED COUNT: 16 % (ref 10–15)
ERYTHROCYTE [DISTWIDTH] IN BLOOD BY AUTOMATED COUNT: 16 % (ref 10–15)
ERYTHROCYTE [DISTWIDTH] IN BLOOD BY AUTOMATED COUNT: 16.1 % (ref 10–15)
ERYTHROCYTE [SEDIMENTATION RATE] IN BLOOD BY WESTERGREN METHOD: 39 MM/H (ref 0–30)
FIBRINOGEN PPP-MCNC: 431 MG/DL (ref 200–420)
FIBRINOGEN PPP-MCNC: 540 MG/DL (ref 200–420)
GFR SERPL CREATININE-BSD FRML MDRD: 40 ML/MIN/{1.73_M2}
GFR SERPL CREATININE-BSD FRML MDRD: 47 ML/MIN/{1.73_M2}
GFR SERPL CREATININE-BSD FRML MDRD: 47 ML/MIN/{1.73_M2}
GFR SERPL CREATININE-BSD FRML MDRD: 53 ML/MIN/{1.73_M2}
GLUCOSE BLD-MCNC: 111 MG/DL (ref 70–99)
GLUCOSE BLD-MCNC: 111 MG/DL (ref 70–99)
GLUCOSE BLD-MCNC: 128 MG/DL (ref 70–99)
GLUCOSE BLD-MCNC: 134 MG/DL (ref 70–99)
GLUCOSE BLDC GLUCOMTR-MCNC: 111 MG/DL (ref 70–99)
GLUCOSE BLDC GLUCOMTR-MCNC: 112 MG/DL (ref 70–99)
GLUCOSE BLDC GLUCOMTR-MCNC: 115 MG/DL (ref 70–99)
GLUCOSE BLDC GLUCOMTR-MCNC: 115 MG/DL (ref 70–99)
GLUCOSE BLDC GLUCOMTR-MCNC: 120 MG/DL (ref 70–99)
GLUCOSE BLDC GLUCOMTR-MCNC: 130 MG/DL (ref 70–99)
GLUCOSE BLDC GLUCOMTR-MCNC: 130 MG/DL (ref 70–99)
GLUCOSE BLDC GLUCOMTR-MCNC: 133 MG/DL (ref 70–99)
GLUCOSE BLDC GLUCOMTR-MCNC: 137 MG/DL (ref 70–99)
GLUCOSE BLDC GLUCOMTR-MCNC: 141 MG/DL (ref 70–99)
GLUCOSE SERPL-MCNC: 108 MG/DL (ref 70–99)
GLUCOSE SERPL-MCNC: 109 MG/DL (ref 70–99)
GLUCOSE SERPL-MCNC: 127 MG/DL (ref 70–99)
GLUCOSE SERPL-MCNC: 135 MG/DL (ref 70–99)
HCO3 BLD-SCNC: 28 MMOL/L (ref 21–28)
HCO3 BLD-SCNC: 29 MMOL/L (ref 21–28)
HCO3 BLD-SCNC: 30 MMOL/L (ref 21–28)
HCO3 BLDA-SCNC: 28 MMOL/L (ref 21–28)
HCO3 BLDA-SCNC: 29 MMOL/L (ref 21–28)
HCO3 BLDV-SCNC: 29 MMOL/L (ref 21–28)
HCO3 BLDV-SCNC: 31 MMOL/L (ref 21–28)
HCT VFR BLD AUTO: 26.9 % (ref 35–47)
HCT VFR BLD AUTO: 27.4 % (ref 35–47)
HCT VFR BLD AUTO: 28.4 % (ref 35–47)
HCT VFR BLD AUTO: 28.6 % (ref 35–47)
HGB BLD-MCNC: 8.4 G/DL (ref 11.7–15.7)
HGB BLD-MCNC: 8.4 G/DL (ref 11.7–15.7)
HGB BLD-MCNC: 8.7 G/DL (ref 11.7–15.7)
HGB BLD-MCNC: 9 G/DL (ref 11.7–15.7)
HGB FREE PLAS-MCNC: <30 MG/DL
INR PPP: 1.27 (ref 0.86–1.14)
INR PPP: 1.28 (ref 0.86–1.14)
INR PPP: 1.3 (ref 0.86–1.14)
INR PPP: 1.31 (ref 0.86–1.14)
KCT BLD-ACNC: 167 SEC (ref 75–150)
KCT BLD-ACNC: 175 SEC (ref 75–150)
KCT BLD-ACNC: 179 SEC (ref 75–150)
KCT BLD-ACNC: 183 SEC (ref 75–150)
KCT BLD-ACNC: 187 SEC (ref 75–150)
KCT BLD-ACNC: 191 SEC (ref 75–150)
KCT BLD-ACNC: 195 SEC (ref 75–150)
KCT BLD-ACNC: 199 SEC (ref 75–150)
KCT BLD-ACNC: 203 SEC (ref 75–150)
LACTATE BLD-SCNC: 0.5 MMOL/L (ref 0.7–2)
LACTATE BLD-SCNC: 0.7 MMOL/L (ref 0.7–2)
LACTATE BLD-SCNC: 0.8 MMOL/L (ref 0.7–2)
LACTATE BLD-SCNC: 0.8 MMOL/L (ref 0.7–2)
LDH SERPL L TO P-CCNC: 586 U/L (ref 81–234)
LMWH PPP CHRO-ACNC: 0.52 IU/ML
LMWH PPP CHRO-ACNC: 0.52 IU/ML
LMWH PPP CHRO-ACNC: 0.56 IU/ML
LMWH PPP CHRO-ACNC: 0.78 IU/ML
MAGNESIUM SERPL-MCNC: 2 MG/DL (ref 1.6–2.3)
MAGNESIUM SERPL-MCNC: 2.1 MG/DL (ref 1.6–2.3)
MCH RBC QN AUTO: 28.8 PG (ref 26.5–33)
MCH RBC QN AUTO: 28.9 PG (ref 26.5–33)
MCH RBC QN AUTO: 29.3 PG (ref 26.5–33)
MCH RBC QN AUTO: 29.4 PG (ref 26.5–33)
MCHC RBC AUTO-ENTMCNC: 30.6 G/DL (ref 31.5–36.5)
MCHC RBC AUTO-ENTMCNC: 30.7 G/DL (ref 31.5–36.5)
MCHC RBC AUTO-ENTMCNC: 31.2 G/DL (ref 31.5–36.5)
MCHC RBC AUTO-ENTMCNC: 31.5 G/DL (ref 31.5–36.5)
MCV RBC AUTO: 94 FL (ref 78–100)
NSE SERPL-MCNC: 23.1 UG/L (ref 3.7–8.9)
NSE SERPL-MCNC: 28.9 UG/L (ref 3.7–8.9)
NUM BPU REQUESTED: 1
O2/TOTAL GAS SETTING VFR VENT: 40 %
O2/TOTAL GAS SETTING VFR VENT: 50 %
OXYHGB MFR BLD: 94 % (ref 92–100)
OXYHGB MFR BLD: 96 % (ref 92–100)
OXYHGB MFR BLD: 97 % (ref 92–100)
OXYHGB MFR BLD: 98 % (ref 92–100)
OXYHGB MFR BLD: 98 % (ref 92–100)
OXYHGB MFR BLDA: 98 % (ref 75–100)
OXYHGB MFR BLDA: 99 % (ref 75–100)
OXYHGB MFR BLDV: 82 %
OXYHGB MFR BLDV: 84 %
PCO2 BLD: 39 MM HG (ref 35–45)
PCO2 BLD: 40 MM HG (ref 35–45)
PCO2 BLD: 40 MM HG (ref 35–45)
PCO2 BLD: 41 MM HG (ref 35–45)
PCO2 BLD: 42 MM HG (ref 35–45)
PCO2 BLD: 43 MM HG (ref 35–45)
PCO2 BLD: 43 MM HG (ref 35–45)
PCO2 BLDA: 36 MM HG (ref 35–45)
PCO2 BLDA: 36 MM HG (ref 35–45)
PCO2 BLDV: 42 MM HG (ref 40–50)
PCO2 BLDV: 43 MM HG (ref 40–50)
PH BLD: 7.43 PH (ref 7.35–7.45)
PH BLD: 7.44 PH (ref 7.35–7.45)
PH BLD: 7.45 PH (ref 7.35–7.45)
PH BLD: 7.46 PH (ref 7.35–7.45)
PH BLD: 7.47 PH (ref 7.35–7.45)
PH BLDA: 7.49 PH (ref 7.35–7.45)
PH BLDA: 7.51 PH (ref 7.35–7.45)
PH BLDV: 7.46 PH (ref 7.32–7.43)
PH BLDV: 7.46 PH (ref 7.32–7.43)
PHOSPHATE SERPL-MCNC: 2.2 MG/DL (ref 2.5–4.5)
PHOSPHATE SERPL-MCNC: 2.6 MG/DL (ref 2.5–4.5)
PLATELET # BLD AUTO: 79 10E9/L (ref 150–450)
PLATELET # BLD AUTO: 84 10E9/L (ref 150–450)
PLATELET # BLD AUTO: 88 10E9/L (ref 150–450)
PLATELET # BLD AUTO: 94 10E9/L (ref 150–450)
PO2 BLD: 106 MM HG (ref 80–105)
PO2 BLD: 111 MM HG (ref 80–105)
PO2 BLD: 112 MM HG (ref 80–105)
PO2 BLD: 113 MM HG (ref 80–105)
PO2 BLD: 118 MM HG (ref 80–105)
PO2 BLD: 124 MM HG (ref 80–105)
PO2 BLD: 130 MM HG (ref 80–105)
PO2 BLD: 76 MM HG (ref 80–105)
PO2 BLD: 79 MM HG (ref 80–105)
PO2 BLD: 86 MM HG (ref 80–105)
PO2 BLD: 92 MM HG (ref 80–105)
PO2 BLD: 93 MM HG (ref 80–105)
PO2 BLDA: 224 MM HG (ref 80–105)
PO2 BLDA: 264 MM HG (ref 80–105)
PO2 BLDV: 47 MM HG (ref 25–47)
PO2 BLDV: 49 MM HG (ref 25–47)
POTASSIUM SERPL-SCNC: 3.7 MMOL/L (ref 3.4–5.3)
POTASSIUM SERPL-SCNC: 3.8 MMOL/L (ref 3.4–5.3)
POTASSIUM SERPL-SCNC: 4 MMOL/L (ref 3.4–5.3)
POTASSIUM SERPL-SCNC: 4.1 MMOL/L (ref 3.4–5.3)
PROT SERPL-MCNC: 4.7 G/DL (ref 6.8–8.8)
PROT SERPL-MCNC: 5 G/DL (ref 6.8–8.8)
PROT SERPL-MCNC: 5.2 G/DL (ref 6.8–8.8)
PROT SERPL-MCNC: 5.3 G/DL (ref 6.8–8.8)
RBC # BLD AUTO: 2.87 10E12/L (ref 3.8–5.2)
RBC # BLD AUTO: 2.91 10E12/L (ref 3.8–5.2)
RBC # BLD AUTO: 3.02 10E12/L (ref 3.8–5.2)
RBC # BLD AUTO: 3.06 10E12/L (ref 3.8–5.2)
SODIUM SERPL-SCNC: 147 MMOL/L (ref 133–144)
SODIUM SERPL-SCNC: 148 MMOL/L (ref 133–144)
TRANSFUSION STATUS PATIENT QL: NORMAL
TROPONIN I SERPL-MCNC: 0.04 UG/L (ref 0–0.04)
TROPONIN I SERPL-MCNC: 0.05 UG/L (ref 0–0.04)
TROPONIN I SERPL-MCNC: 0.06 UG/L (ref 0–0.04)
TROPONIN I SERPL-MCNC: 0.06 UG/L (ref 0–0.04)
VANCOMYCIN SERPL-MCNC: 12.4 MG/L
WBC # BLD AUTO: 14.6 10E9/L (ref 4–11)
WBC # BLD AUTO: 15.9 10E9/L (ref 4–11)
WBC # BLD AUTO: 17.5 10E9/L (ref 4–11)
WBC # BLD AUTO: 18 10E9/L (ref 4–11)

## 2019-09-12 PROCEDURE — 87186 SC STD MICRODIL/AGAR DIL: CPT | Performed by: INTERNAL MEDICINE

## 2019-09-12 PROCEDURE — 86850 RBC ANTIBODY SCREEN: CPT | Performed by: INTERNAL MEDICINE

## 2019-09-12 PROCEDURE — 82947 ASSAY GLUCOSE BLOOD QUANT: CPT | Performed by: STUDENT IN AN ORGANIZED HEALTH CARE EDUCATION/TRAINING PROGRAM

## 2019-09-12 PROCEDURE — 40000014 ZZH STATISTIC ARTERIAL MONITORING DAILY

## 2019-09-12 PROCEDURE — P9037 PLATE PHERES LEUKOREDU IRRAD: HCPCS | Performed by: INTERNAL MEDICINE

## 2019-09-12 PROCEDURE — 85610 PROTHROMBIN TIME: CPT | Performed by: STUDENT IN AN ORGANIZED HEALTH CARE EDUCATION/TRAINING PROGRAM

## 2019-09-12 PROCEDURE — 93325 DOPPLER ECHO COLOR FLOW MAPG: CPT | Mod: 26 | Performed by: INTERNAL MEDICINE

## 2019-09-12 PROCEDURE — 86901 BLOOD TYPING SEROLOGIC RH(D): CPT | Performed by: INTERNAL MEDICINE

## 2019-09-12 PROCEDURE — 85300 ANTITHROMBIN III ACTIVITY: CPT | Performed by: INTERNAL MEDICINE

## 2019-09-12 PROCEDURE — 86140 C-REACTIVE PROTEIN: CPT | Performed by: INTERNAL MEDICINE

## 2019-09-12 PROCEDURE — 85027 COMPLETE CBC AUTOMATED: CPT | Performed by: INTERNAL MEDICINE

## 2019-09-12 PROCEDURE — 85520 HEPARIN ASSAY: CPT | Performed by: INTERNAL MEDICINE

## 2019-09-12 PROCEDURE — 93308 TTE F-UP OR LMTD: CPT | Mod: 26 | Performed by: INTERNAL MEDICINE

## 2019-09-12 PROCEDURE — 25800030 ZZH RX IP 258 OP 636: Performed by: STUDENT IN AN ORGANIZED HEALTH CARE EDUCATION/TRAINING PROGRAM

## 2019-09-12 PROCEDURE — 85384 FIBRINOGEN ACTIVITY: CPT | Performed by: INTERNAL MEDICINE

## 2019-09-12 PROCEDURE — P9016 RBC LEUKOCYTES REDUCED: HCPCS | Performed by: INTERNAL MEDICINE

## 2019-09-12 PROCEDURE — 94640 AIRWAY INHALATION TREATMENT: CPT

## 2019-09-12 PROCEDURE — 93308 TTE F-UP OR LMTD: CPT

## 2019-09-12 PROCEDURE — 86923 COMPATIBILITY TEST ELECTRIC: CPT | Performed by: INTERNAL MEDICINE

## 2019-09-12 PROCEDURE — 25000128 H RX IP 250 OP 636: Performed by: STUDENT IN AN ORGANIZED HEALTH CARE EDUCATION/TRAINING PROGRAM

## 2019-09-12 PROCEDURE — 93321 DOPPLER ECHO F-UP/LMTD STD: CPT | Mod: 26 | Performed by: INTERNAL MEDICINE

## 2019-09-12 PROCEDURE — 82805 BLOOD GASES W/O2 SATURATION: CPT | Performed by: STUDENT IN AN ORGANIZED HEALTH CARE EDUCATION/TRAINING PROGRAM

## 2019-09-12 PROCEDURE — 33949 ECMO/ECLS DAILY MGMT ARTERY: CPT | Performed by: INTERNAL MEDICINE

## 2019-09-12 PROCEDURE — 83605 ASSAY OF LACTIC ACID: CPT | Performed by: STUDENT IN AN ORGANIZED HEALTH CARE EDUCATION/TRAINING PROGRAM

## 2019-09-12 PROCEDURE — 27210437 ZZH NUTRITION PRODUCT SEMIELEM INTERMED LITER

## 2019-09-12 PROCEDURE — 85610 PROTHROMBIN TIME: CPT | Performed by: INTERNAL MEDICINE

## 2019-09-12 PROCEDURE — 83735 ASSAY OF MAGNESIUM: CPT | Performed by: STUDENT IN AN ORGANIZED HEALTH CARE EDUCATION/TRAINING PROGRAM

## 2019-09-12 PROCEDURE — 85730 THROMBOPLASTIN TIME PARTIAL: CPT | Performed by: STUDENT IN AN ORGANIZED HEALTH CARE EDUCATION/TRAINING PROGRAM

## 2019-09-12 PROCEDURE — 25000128 H RX IP 250 OP 636: Performed by: INTERNAL MEDICINE

## 2019-09-12 PROCEDURE — 80053 COMPREHEN METABOLIC PANEL: CPT | Performed by: INTERNAL MEDICINE

## 2019-09-12 PROCEDURE — 82947 ASSAY GLUCOSE BLOOD QUANT: CPT | Performed by: INTERNAL MEDICINE

## 2019-09-12 PROCEDURE — 33949 ECMO/ECLS DAILY MGMT ARTERY: CPT

## 2019-09-12 PROCEDURE — 83605 ASSAY OF LACTIC ACID: CPT | Performed by: INTERNAL MEDICINE

## 2019-09-12 PROCEDURE — 94640 AIRWAY INHALATION TREATMENT: CPT | Mod: 76

## 2019-09-12 PROCEDURE — 82805 BLOOD GASES W/O2 SATURATION: CPT | Performed by: INTERNAL MEDICINE

## 2019-09-12 PROCEDURE — 83051 HEMOGLOBIN PLASMA: CPT | Performed by: INTERNAL MEDICINE

## 2019-09-12 PROCEDURE — 25000132 ZZH RX MED GY IP 250 OP 250 PS 637: Performed by: INTERNAL MEDICINE

## 2019-09-12 PROCEDURE — 84484 ASSAY OF TROPONIN QUANT: CPT | Performed by: INTERNAL MEDICINE

## 2019-09-12 PROCEDURE — 00000146 ZZHCL STATISTIC GLUCOSE BY METER IP

## 2019-09-12 PROCEDURE — 85520 HEPARIN ASSAY: CPT | Performed by: STUDENT IN AN ORGANIZED HEALTH CARE EDUCATION/TRAINING PROGRAM

## 2019-09-12 PROCEDURE — 20000004 ZZH R&B ICU UMMC

## 2019-09-12 PROCEDURE — 85379 FIBRIN DEGRADATION QUANT: CPT | Performed by: INTERNAL MEDICINE

## 2019-09-12 PROCEDURE — P9016 RBC LEUKOCYTES REDUCED: HCPCS

## 2019-09-12 PROCEDURE — 87077 CULTURE AEROBIC IDENTIFY: CPT | Performed by: INTERNAL MEDICINE

## 2019-09-12 PROCEDURE — 82330 ASSAY OF CALCIUM: CPT | Performed by: INTERNAL MEDICINE

## 2019-09-12 PROCEDURE — 85652 RBC SED RATE AUTOMATED: CPT | Performed by: INTERNAL MEDICINE

## 2019-09-12 PROCEDURE — 83735 ASSAY OF MAGNESIUM: CPT | Performed by: INTERNAL MEDICINE

## 2019-09-12 PROCEDURE — 84484 ASSAY OF TROPONIN QUANT: CPT | Performed by: STUDENT IN AN ORGANIZED HEALTH CARE EDUCATION/TRAINING PROGRAM

## 2019-09-12 PROCEDURE — 36415 COLL VENOUS BLD VENIPUNCTURE: CPT | Performed by: INTERNAL MEDICINE

## 2019-09-12 PROCEDURE — 25000125 ZZHC RX 250: Performed by: INTERNAL MEDICINE

## 2019-09-12 PROCEDURE — 71045 X-RAY EXAM CHEST 1 VIEW: CPT

## 2019-09-12 PROCEDURE — 84100 ASSAY OF PHOSPHORUS: CPT | Performed by: INTERNAL MEDICINE

## 2019-09-12 PROCEDURE — 25000125 ZZHC RX 250: Performed by: STUDENT IN AN ORGANIZED HEALTH CARE EDUCATION/TRAINING PROGRAM

## 2019-09-12 PROCEDURE — C9113 INJ PANTOPRAZOLE SODIUM, VIA: HCPCS | Performed by: INTERNAL MEDICINE

## 2019-09-12 PROCEDURE — 85730 THROMBOPLASTIN TIME PARTIAL: CPT | Performed by: INTERNAL MEDICINE

## 2019-09-12 PROCEDURE — 87040 BLOOD CULTURE FOR BACTERIA: CPT | Performed by: INTERNAL MEDICINE

## 2019-09-12 PROCEDURE — 25000132 ZZH RX MED GY IP 250 OP 250 PS 637: Performed by: STUDENT IN AN ORGANIZED HEALTH CARE EDUCATION/TRAINING PROGRAM

## 2019-09-12 PROCEDURE — 40000275 ZZH STATISTIC RCP TIME EA 10 MIN

## 2019-09-12 PROCEDURE — 85347 COAGULATION TIME ACTIVATED: CPT

## 2019-09-12 PROCEDURE — 80202 ASSAY OF VANCOMYCIN: CPT | Performed by: INTERNAL MEDICINE

## 2019-09-12 PROCEDURE — 25800030 ZZH RX IP 258 OP 636: Performed by: INTERNAL MEDICINE

## 2019-09-12 PROCEDURE — 85027 COMPLETE CBC AUTOMATED: CPT | Performed by: STUDENT IN AN ORGANIZED HEALTH CARE EDUCATION/TRAINING PROGRAM

## 2019-09-12 PROCEDURE — 80053 COMPREHEN METABOLIC PANEL: CPT | Performed by: STUDENT IN AN ORGANIZED HEALTH CARE EDUCATION/TRAINING PROGRAM

## 2019-09-12 PROCEDURE — 99233 SBSQ HOSP IP/OBS HIGH 50: CPT | Mod: 25 | Performed by: INTERNAL MEDICINE

## 2019-09-12 PROCEDURE — 94003 VENT MGMT INPAT SUBQ DAY: CPT

## 2019-09-12 PROCEDURE — 86900 BLOOD TYPING SEROLOGIC ABO: CPT | Performed by: INTERNAL MEDICINE

## 2019-09-12 PROCEDURE — 83615 LACTATE (LD) (LDH) ENZYME: CPT | Performed by: INTERNAL MEDICINE

## 2019-09-12 PROCEDURE — 82330 ASSAY OF CALCIUM: CPT | Performed by: STUDENT IN AN ORGANIZED HEALTH CARE EDUCATION/TRAINING PROGRAM

## 2019-09-12 PROCEDURE — 87070 CULTURE OTHR SPECIMN AEROBIC: CPT | Performed by: INTERNAL MEDICINE

## 2019-09-12 RX ORDER — IPRATROPIUM BROMIDE AND ALBUTEROL SULFATE 2.5; .5 MG/3ML; MG/3ML
3 SOLUTION RESPIRATORY (INHALATION)
Status: DISCONTINUED | OUTPATIENT
Start: 2019-09-12 | End: 2019-09-19

## 2019-09-12 RX ORDER — FUROSEMIDE 10 MG/ML
60 INJECTION INTRAMUSCULAR; INTRAVENOUS ONCE
Status: COMPLETED | OUTPATIENT
Start: 2019-09-12 | End: 2019-09-12

## 2019-09-12 RX ORDER — DIGOXIN 0.25 MG/ML
250 INJECTION INTRAMUSCULAR; INTRAVENOUS EVERY 6 HOURS
Status: DISCONTINUED | OUTPATIENT
Start: 2019-09-12 | End: 2019-09-12

## 2019-09-12 RX ORDER — AMOXICILLIN 250 MG
1 CAPSULE ORAL AT BEDTIME
Status: DISCONTINUED | OUTPATIENT
Start: 2019-09-12 | End: 2019-09-15

## 2019-09-12 RX ORDER — DOBUTAMINE HYDROCHLORIDE 200 MG/100ML
2.5 INJECTION INTRAVENOUS CONTINUOUS
Status: DISPENSED | OUTPATIENT
Start: 2019-09-12 | End: 2019-09-15

## 2019-09-12 RX ADMIN — PIPERACILLIN SODIUM AND TAZOBACTAM SODIUM 3.38 G: 3; .375 INJECTION, POWDER, LYOPHILIZED, FOR SOLUTION INTRAVENOUS at 05:05

## 2019-09-12 RX ADMIN — FENTANYL CITRATE 50 MCG: 50 INJECTION, SOLUTION INTRAMUSCULAR; INTRAVENOUS at 19:49

## 2019-09-12 RX ADMIN — IPRATROPIUM BROMIDE AND ALBUTEROL SULFATE 3 ML: .5; 3 SOLUTION RESPIRATORY (INHALATION) at 12:16

## 2019-09-12 RX ADMIN — HEPARIN SODIUM 35.41 UNITS/KG/HR: 10000 INJECTION, SOLUTION INTRAVENOUS at 02:30

## 2019-09-12 RX ADMIN — HEPARIN SODIUM 2400 UNITS/HR: 10000 INJECTION, SOLUTION INTRAVENOUS at 13:24

## 2019-09-12 RX ADMIN — DEXMEDETOMIDINE 0.5 MCG/KG/HR: 100 INJECTION, SOLUTION, CONCENTRATE INTRAVENOUS at 11:43

## 2019-09-12 RX ADMIN — ALBUTEROL SULFATE 2.5 MG: 2.5 SOLUTION RESPIRATORY (INHALATION) at 03:30

## 2019-09-12 RX ADMIN — Medication: at 06:14

## 2019-09-12 RX ADMIN — Medication 75 MCG/HR: at 18:35

## 2019-09-12 RX ADMIN — MIDAZOLAM HYDROCHLORIDE 2 MG: 2 INJECTION, SOLUTION INTRAMUSCULAR; INTRAVENOUS at 04:00

## 2019-09-12 RX ADMIN — HUMAN INSULIN 4 UNITS/HR: 100 INJECTION, SOLUTION SUBCUTANEOUS at 02:30

## 2019-09-12 RX ADMIN — MIDAZOLAM HYDROCHLORIDE 2 MG: 2 INJECTION, SOLUTION INTRAMUSCULAR; INTRAVENOUS at 15:33

## 2019-09-12 RX ADMIN — SENNOSIDES AND DOCUSATE SODIUM 1 TABLET: 8.6; 5 TABLET ORAL at 22:26

## 2019-09-12 RX ADMIN — Medication 0.02 MCG/KG/MIN: at 10:38

## 2019-09-12 RX ADMIN — DEXMEDETOMIDINE 0.7 MCG/KG/HR: 100 INJECTION, SOLUTION, CONCENTRATE INTRAVENOUS at 15:33

## 2019-09-12 RX ADMIN — IPRATROPIUM BROMIDE AND ALBUTEROL SULFATE 3 ML: .5; 3 SOLUTION RESPIRATORY (INHALATION) at 08:15

## 2019-09-12 RX ADMIN — MULTIVITAMIN 15 ML: LIQUID ORAL at 07:49

## 2019-09-12 RX ADMIN — POTASSIUM CHLORIDE 20 MEQ: 1.5 POWDER, FOR SOLUTION ORAL at 04:36

## 2019-09-12 RX ADMIN — DEXMEDETOMIDINE 0.7 MCG/KG/HR: 100 INJECTION, SOLUTION, CONCENTRATE INTRAVENOUS at 18:42

## 2019-09-12 RX ADMIN — MIDAZOLAM 4 MG/HR: 5 INJECTION INTRAMUSCULAR; INTRAVENOUS at 06:07

## 2019-09-12 RX ADMIN — HEPARIN SODIUM 43.84 UNITS/KG/HR: 10000 INJECTION, SOLUTION INTRAVENOUS at 22:19

## 2019-09-12 RX ADMIN — HEPARIN SODIUM (PORCINE) LOCK FLUSH IV SOLN 100 UNIT/ML 3000 UNITS: 100 SOLUTION at 13:20

## 2019-09-12 RX ADMIN — DEXMEDETOMIDINE 0.5 MCG/KG/HR: 100 INJECTION, SOLUTION, CONCENTRATE INTRAVENOUS at 06:45

## 2019-09-12 RX ADMIN — DEXMEDETOMIDINE 0.7 MCG/KG/HR: 100 INJECTION, SOLUTION, CONCENTRATE INTRAVENOUS at 02:02

## 2019-09-12 RX ADMIN — VANCOMYCIN HYDROCHLORIDE 2750 MG: 10 INJECTION, POWDER, LYOPHILIZED, FOR SOLUTION INTRAVENOUS at 21:19

## 2019-09-12 RX ADMIN — DEXMEDETOMIDINE 0.7 MCG/KG/HR: 100 INJECTION, SOLUTION, CONCENTRATE INTRAVENOUS at 22:19

## 2019-09-12 RX ADMIN — HEPARIN SODIUM (PORCINE) LOCK FLUSH IV SOLN 100 UNIT/ML 1500 UNITS: 100 SOLUTION at 00:31

## 2019-09-12 RX ADMIN — IPRATROPIUM BROMIDE AND ALBUTEROL SULFATE 3 ML: .5; 3 SOLUTION RESPIRATORY (INHALATION) at 16:18

## 2019-09-12 RX ADMIN — PIPERACILLIN SODIUM AND TAZOBACTAM SODIUM 3.38 G: 3; .375 INJECTION, POWDER, LYOPHILIZED, FOR SOLUTION INTRAVENOUS at 16:52

## 2019-09-12 RX ADMIN — HUMAN INSULIN 4 UNITS/HR: 100 INJECTION, SOLUTION SUBCUTANEOUS at 10:38

## 2019-09-12 RX ADMIN — POTASSIUM CHLORIDE 20 MEQ: 29.8 INJECTION, SOLUTION INTRAVENOUS at 12:20

## 2019-09-12 RX ADMIN — MIDAZOLAM HYDROCHLORIDE 2 MG: 2 INJECTION, SOLUTION INTRAMUSCULAR; INTRAVENOUS at 19:49

## 2019-09-12 RX ADMIN — THIAMINE HCL TAB 100 MG 100 MG: 100 TAB at 07:49

## 2019-09-12 RX ADMIN — PANTOPRAZOLE SODIUM 40 MG: 40 INJECTION, POWDER, LYOPHILIZED, FOR SOLUTION INTRAVENOUS at 07:49

## 2019-09-12 RX ADMIN — PIPERACILLIN SODIUM AND TAZOBACTAM SODIUM 3.38 G: 3; .375 INJECTION, POWDER, LYOPHILIZED, FOR SOLUTION INTRAVENOUS at 11:31

## 2019-09-12 RX ADMIN — MIDAZOLAM HYDROCHLORIDE 2 MG: 2 INJECTION, SOLUTION INTRAMUSCULAR; INTRAVENOUS at 13:30

## 2019-09-12 RX ADMIN — HEPARIN SODIUM (PORCINE) LOCK FLUSH IV SOLN 100 UNIT/ML 3000 UNITS: 100 SOLUTION at 04:39

## 2019-09-12 RX ADMIN — FENTANYL CITRATE 50 MCG: 50 INJECTION, SOLUTION INTRAMUSCULAR; INTRAVENOUS at 20:30

## 2019-09-12 RX ADMIN — IPRATROPIUM BROMIDE AND ALBUTEROL SULFATE 3 ML: .5; 3 SOLUTION RESPIRATORY (INHALATION) at 22:00

## 2019-09-12 RX ADMIN — DOBUTAMINE HYDROCHLORIDE 2.5 MCG/KG/MIN: 200 INJECTION INTRAVENOUS at 11:31

## 2019-09-12 RX ADMIN — HUMAN INSULIN 3 UNITS/HR: 100 INJECTION, SOLUTION SUBCUTANEOUS at 18:43

## 2019-09-12 RX ADMIN — FUROSEMIDE 60 MG: 10 INJECTION, SOLUTION INTRAVENOUS at 07:49

## 2019-09-12 RX ADMIN — Medication: at 22:34

## 2019-09-12 RX ADMIN — FENTANYL CITRATE 50 MCG: 50 INJECTION, SOLUTION INTRAMUSCULAR; INTRAVENOUS at 13:30

## 2019-09-12 RX ADMIN — MIDAZOLAM HYDROCHLORIDE 3 MG: 2 INJECTION, SOLUTION INTRAMUSCULAR; INTRAVENOUS at 10:00

## 2019-09-12 ASSESSMENT — ACTIVITIES OF DAILY LIVING (ADL)
ADLS_ACUITY_SCORE: 15
ADLS_ACUITY_SCORE: 15
ADLS_ACUITY_SCORE: 19
ADLS_ACUITY_SCORE: 15
ADLS_ACUITY_SCORE: 15
ADLS_ACUITY_SCORE: 19

## 2019-09-12 ASSESSMENT — MIFFLIN-ST. JEOR: SCORE: 2366.5

## 2019-09-12 NOTE — PROGRESS NOTES
Cardiology Critical Care Note - Cardiology  Raffaele Stuart M.D.  The patient remains unstable in the ICU with on-going need for ventilator support, parenteral medications for the adjustment of blood pressure and cardiac output and maintenance of renal function.      The patient is seen for prolonged ventilator management requiring oxygen and/or pressure modulation; shock requiring vasopressor and or inotropic agents; low cardiac output necessitating inotropic agents, vasopressors and afterload reducing agents; VA ECMO;  RIGHT ventricular failure Pneumonia with staph requiring antibiotic selection and monitoring, v  I personally reviewed:  Arterial and venous blood gases to assess acid base balance, oxygenation, and ventilator settings.    .  Ventilatory settings and/or supplement oxygen needs in order to obtain optimal oxygenation and electrolyte balance at low possible pressure support and inspired oxygen tension      ECMO Attending Progress Note  9/12/2019    59 year old female with a history of hypertension, hyperlipidemia, sleep apnea, morbid obesity, and chronic bronchitis who presents to the emergency department via EMS for evaluation of respiratory distress s/p intubation and PEA arrest with CTA consistent with bilateral PE transferred here for VA ECMO thrombectomy and catheter directed lytics on 9/9.  Interval events: improving RV function still critically ill on low dose pressors    Physical Exam:  Temp:  [96.3  F (35.7  C)-99.1  F (37.3  C)] 99  F (37.2  C)  Heart Rate:  [50-93] 73  Resp:  [12-19] 13  MAP:  [62 mmHg-95 mmHg] 62 mmHg  Arterial Line BP: ()/(50-81) 94/50  FiO2 (%):  [40 %-45 %] 40 %  SpO2:  [90 %-100 %] 99 %    Intake/Output Summary (Last 24 hours) at 9/12/2019 1010  Last data filed at 9/12/2019 1000  Gross per 24 hour   Intake 5757.87 ml   Output 5355 ml   Net 402.87 ml    Ventilation Mode: CMV/AC  (Continuous Mandatory Ventilation/ Assist Control)  FiO2 (%): 40 %  Rate Set  (breaths/minute): 12 breaths/min  Tidal Volume Set (mL): 450 mL  PEEP (cm H2O): 8 cmH2O  Oxygen Concentration (%): 40 %  Resp: 13     General: no acute distress, intubated and sedated  HEENT: PERRLA, conjunctiva clear, without icterus or pallor, oropharyx clear  Cardiac: RRR nl S1S2   Lungs: clear to auscultation and percussion bilaterally anterior  Abdomen: soft, nontender, non distended, no hepatosplenomegaly or masses  Extremities: without edema or cyanosis; pulses are palpable;   Skin: normal skin appearance without worrisome lesions.   Neurologic:intubated and sedated,     Labs:  Recent Labs   Lab 09/12/19  0941 09/12/19  0750 09/12/19  0553 09/12/19  0342   PH 7.46* 7.46* 7.46* 7.47*   PCO2 41 40 40 39   PO2 76* 112* 111* 113*   HCO3 29* 28 28 28   O2PER 40 40.0 40.0 40.0      Recent Labs   Lab 09/12/19  0941 09/12/19  0342 09/11/19  2315 09/11/19  2157   WBC 17.5* 18.0* 22.3* Canceled, Test credited   HGB 9.0* 8.4* 9.2* Canceled, Test credited     Creatinine   Date Value Ref Range Status   09/12/2019 1.43 (H) 0.52 - 1.04 mg/dL Final   09/11/2019 1.49 (H) 0.52 - 1.04 mg/dL Final   09/11/2019 1.65 (H) 0.52 - 1.04 mg/dL Final   09/11/2019 1.67 (H) 0.52 - 1.04 mg/dL Final       ECMO Issues including assessments and plan on DOS 9/12/2019:  Neuro: Sedated for mechanical ventilation and ECMO.  NIRS stable  b/l  RASS goal: 0-1  CV: Cardiogenic shock.  Hemodynamically stable on norepi  Pulm: Flows unchanged at 4, Sweep unchanged at 3, Keep vent settings at rest settings:as above  FEN/Renal: Electrolytes stable w/ replacement protocols in place, Cr stable, UOP stable  Heme: ACT goal: 200, Hemoglobin stable .  Goals: if O2 sat >85% Hgb >8.  If O2 Sat <85% keep Hgb 10-12.  Minimal oozing around the ECMO cannulas.  ID: Receiving empiric antibiotics  Cannulae: Position is acceptable on exam and the available imaging.  Distal perfusion cannula is in place and patent.     I have personally reviewed the ECMO flows,  oxygenation and CO2 clearance, anticoagulation, and cannula position.  I have also personally assessed the patient's systemic response with hemodynamics, oxygenation, ventilation, and bleeding.         September 12, 2019

## 2019-09-12 NOTE — PROCEDURES
Procedure Date: 09/11/2019      EEG #-2 -- DAY #2 OF 24-HOUR VIDEO EEG       DATE OF RECORDING/SERVICE DATE:  09/11/2019       SOURCE FILE DURATION:  17 hours, 59 minutes, 59 seconds       CLINICAL SUMMARY:  The patient is a 59-year-old female with history of PEA arrest secondary to bilateral PE.  Patient underwent video EEG monitoring to evaluate for seizures.      MEDICATIONS:  Fentanyl drip 75 mcg per hour, Versed drip 7 mg/kg per hour, which was discontinued at 07:47, Precedex drip, propofol drip 30 mcg/kg per minute which was decreased to 25 mcg at 07:40.      TECHNICAL SUMMARY: This continuous video- EEG monitoring procedure was performed with 23 scalp electrodes in 10-20 electrode system placement, and additional scalp, precordial and other surface electrodes used for electrical referencing and artifact detection.  Video monitoring was utilized and periodically reviewed by EEG technologists and the physician for electroclinical correlations.     INTERICTAL ACTIVITY:  No posterior dominant rhythm was appreciated.  Diffuse 2-7 Hz theta-delta activities were present throughout the recording.  Rare faster beta activities were also seen superimposed over slower frequencies.  Intermittent brief generalized attenuations of 1 second were also seen.  No sleep-wake cycling was appreciated.  No epileptiform discharges were present.      CLINICAL/ICTAL EVENTS:  No electrographic or clinical seizures were recorded.      IMPRESSION:  This is an abnormal video EEG due to the presence of diffuse theta-delta slowing, absence of PDR or sleep-wake cycling and non-reactivity of the EEG consistent with severe diffuse nonspecific encephalopathy.  No epileptiform discharges were present.  No electrographic or clinical seizures were recorded.  Clinical correlation advised.         JUSTINE PHIPPS MD             D: 09/12/2019   T: 09/12/2019   MT: CRISELDA      Name:     XAVIER ROWELL   MRN:      [id number]         Account:        [id number]   :      1960           Procedure Date: 2019      Document: [id number]

## 2019-09-12 NOTE — PLAN OF CARE
ECMO [place] STUDY    Inotropes/Pressors:0.02NE  VC FiO2 50%, peep 6 's    Sweep of 1 and 50% FiO2 on circuit    Flow   HR BP MAP O2 Sat  SVO2   4LPM  70 110/65 78 100  80   3  71 103/58 69 100  80.7  2  72 92/52 61 99  78     1  72 86/46 58 100  77    At lower flows RV dilated slightly function mild to moderately decreased. Formal read pending    Elvira Chilel MD  Cardiology fellow  September 12, 2019

## 2019-09-12 NOTE — PROGRESS NOTES
Care Coordinator Progress Note    Admission Date/Time:  9/9/2019  Attending MD:  Kaleb Mcfarlane MD    Data  Chart reviewed, discussed with interdisciplinary team.   Patient was admitted for: Pulmonary embolism with acute cor pulmonale, unspecified chronicity, unspecified pulmonary embolism type (H).    Concerns with insurance coverage for discharge needs: None.  Support System:Siblings and mother- Supportive and Involved  Services Involved: None  Transportation at Discharge: Family or friend will provide  Transportation to Medical Appointments:   - Not applicable  Barriers to Discharge: chronically ill    Assessment  Pt is in ICU, vented, sedated and with ECMO.  Visited pt sister to introduce self and for support.  RNCC role discussed and contact info provided.  Pt sister stated the team have been updating them well about the plan of care.  Pt sister stated pt has good family and friends support.  Pt 3 sibling and mom lives locally.  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  RNCC will cont to follow plan of care.     Plan  Anticipated Discharge Date:  TBD.  Anticipated Discharge Plan:   TBD.  RNCC will cont to follow plan of care.      Kim Ramires RN, PHN, BSN  4A and 4E/ ICU  Care Coordinator  Phone: [phone]  Pager: [phone]

## 2019-09-12 NOTE — PROGRESS NOTES
ECMO Shift Summary:    Patient remains on VA ECMO, all equipment is functioning and alarms are appropriately set. RPM's 3650 with flow range 3.49-4.24 L/min. Sweep gas is at 2.5 LPM and FiO2 50%. Circuit remains free of visible air, clot and fibrin. Cannulas are secure with no bleeding from site. Extremities are warm to the touch.     Significant Shift Events:   ~0430 MD notified of concern for HIT due to decreasing platelets despite platelet administration.  Plan to address in the morning.      Vent settings:  CMV 12/450/+8/40%.  BS diminished, coarse with expiratory wheezes.  Given Albuterol 2.5 mg nebs Q4-no change appreciated.  Suctioned ETT for moderate tan secretions.    Heparin is running at 2,100 unit(s)/hr (35.4 u/kg/hr), ACT range 167-199.  Given two 3,000 unit(s) boluses and one 1,500 unit bolus to reach ACT goal while giving platelets.    Urine output is ~95 ml/hr and yellow.  Blood loss was not appreciated. Product given included 2 units of platelets and 1 unit of PRBCs to maintain circuit integrity.      Intake/Output Summary (Last 24 hours) at 2019 0632  Last data filed at 2019 0600  Gross per 24 hour   Intake 5571.5 ml   Output 4445 ml   Net 1126.5 ml       ECHO:  No results found for this or any previous visit.  Results for orders placed in visit on 18   Echocardiogram Complete    Narrative [id number]  ECH19  [id number]  [id number]^CARRINGTON^VINOD^WILLIS           [facility]  Diagnostic and Treamtent-3rd Floor  [address] [place] [id number]     Name: XAVIER ROWELL  MRN: [id number]  : 1960  Study Date: 2018 05:59 PM  Age: 58 yrs  Gender: Female  Patient Location: [facility] [place]  Reason For Study: Cough, Edema extremities  Ordering Physician: VINOD SHULTZ  Referring Physician: VINOD SHULTZ  Performed By: Ryley Ridley RDCS     BSA: 2.7 m2  Height: 66 in  Weight: 418 lb  HR: 85  BP: 148/77  mmHg  _____________________________________________________________________________  __        Procedure  Echocardiogram with two-dimensional, color and spectral Doppler performed.  _____________________________________________________________________________  __        Interpretation Summary  Moderate concentric wall thickening consistent with left ventricular  hypertrophy is present.  Biventricular size and systolic function is normal.The LVEF is 55-60%.  No significant valve disease.  Inferior vena cava appers dilated measuring 2.4 cm  _____________________________________________________________________________  __        Left Ventricle  Left ventricular size is normal. Left ventricular function is normal.The EF is  55-60%. Moderate concentric wall thickening consistent with left ventricular  hypertrophy is present. Left ventricular diastolic function is indeterminate.  Regional wall motion is probably normal. Endocardial border definition  suboptimal and contrast not given.     Right Ventricle  The right ventricle is normal size. Global right ventricular function is  normal.     Atria  Both atria appear normal.     Mitral Valve  Mild mitral annular calcification is present. Trace mitral insufficiency is  present.        Aortic Valve  Mild aortic valve sclerosis is present. No aortic regurgitation is present.  Transvalvular Doppler is normal and without signs of signifcant stenosis.     Tricuspid Valve  The peak velocity of the tricuspid regurgitant jet is not obtainable.  Pulmonary artery systolic pressure cannot be assessed.     Pulmonic Valve  The pulmonic valve cannot be assessed.     Vessels  The aorta root is normal. Inferior vena cava appers dilated measuring 2.4 cm.  Ascending aorta 3.3 cm.     Pericardium  No pericardial effusion is present.     _____________________________________________________________________________  __  MMode/2D Measurements & Calculations  IVSd: 1.5 cm  LVIDd: 5.4 cm  LVIDs: 3.7  cm  LVPWd: 1.3 cm  FS: 30.5 %  LV mass(C)d: 323.5 grams  LV mass(C)dI: 118.2 grams/m2     asc Aorta Diam: 3.3 cm  LVOT diam: 2.2 cm  LVOT area: 4.0 cm2  LA Volume (BP): 71.6 ml  LA Volume Index (BP): 26.1 ml/m2  RWT: 0.47        Doppler Measurements & Calculations  MV E max emile: 67.4 cm/sec  MV A max emile: 88.8 cm/sec  MV E/A: 0.76  MV dec time: 0.17 sec  Ao V2 max: 203.2 cm/sec  Ao max P.0 mmHg  MERON(V,D): 2.3 cm2     LV V1 max P.5 mmHg  LV V1 max: 116.8 cm/sec  AV Emile Ratio (DI): 0.57  E/E' av.0  Lateral E/e': 13.4  Medial E/e': 12.6     _____________________________________________________________________________  __           Report approved by: Tariq Pappas 2018 10:50 AM          CXR:  Recent Results (from the past 24 hour(s))   XR Chest Port 1 View    Narrative    XR CHEST PORT 1 VW  2019 8:44 AM      HISTORY: ECMO and intubated    COMPARISON: Same-day chest radiograph    FINDINGS:   Single AP radiograph of the chest. Underpenetrated study. Endotracheal  tube tip is positioned over the mid thoracic trachea. Right IJ central  line with tip overlying the mid to low SVC. Enteric and feeding tubes  course beyond the field-of-view, with the enteric tube sidehole  projecting over the left upper quadrant. Inferior approach ECMO  catheter overlying the right atrium. Unchanged cardiomegaly. Lung  volumes are similar with unchanged diffuse mixed interstitial and  patchy airspace opacities. New left upper lobe airspace consolidation.  Unchanged retrocardiac opacities. Unchanged small left pleural  effusion. No pneumothoraces.      Impression    IMPRESSION:   1. New left upper lobe airspace consolidation  2. Unchanged left basilar opacities and pleural effusion.  3. Stable support devices.    I have personally reviewed the examination and initial interpretation  and I agree with the findings.    DANY GIRALDO MD       Labs:  Recent Labs   Lab 19  0553 19  0342 19  0151  09/11/19  2350   PH 7.46* 7.47* 7.44 7.43   PCO2 40 39 41 41   PO2 111* 113* 106* 118*   HCO3 28 28 28 28   O2PER 40.0 40.0 40.0 40.0       Lab Results   Component Value Date    HGB 8.4 (L) 09/12/2019    PHGB 40 (H) 09/11/2019    PLT 79 (L) 09/12/2019    FIBR 431 (H) 09/12/2019    INR 1.30 (H) 09/12/2019    PTT 99 (H) 09/12/2019    DD >20.0 (H) 09/12/2019    AXA 0.52 09/12/2019    ANTCH 67 (L) 09/11/2019         Plan is to continue VA ECMO support, potential turndown today.      Mindi Blake, RT, RRT  9/12/2019 6:32 AM

## 2019-09-12 NOTE — PLAN OF CARE
D: ECMO, intubated, minimal inotropic support  I/A: Started Dobuatmine this AM, initial bradycardia with HR down to 40s. Gtt stopped. Levophed gtt remained on to keep MAPs >60. ECMO turndown with ECHO in room around 1300, tolerated well. ECHO showed EF 50% at 1L flows, see report for details. LPM left at 3 per CSI MD. Dobutamine restarted around 1330, after turndown, tolerated well with adequate MAPs and HR remained 60-80s SR. Bowel regimen started. Large amount of bile output from OG. TF remain at goal, flushes increases for elevated Na levels. Pt sister at bedside, updated on pt status/POC by RN and NAVEED DRUMMOND.  R: Continue POC, anticipate ECMO decannulation tomorrow.

## 2019-09-12 NOTE — PLAN OF CARE
59 y.o. F admitted with B. PE currently on VA ECMO  Neuro: Unchanged.   CV: SR 60-70's, vagels to 50's with coughing. MAP goal >65, Levo titrated accordingly. VA ECMO continued see ECMO progress note. X2 Plt and x1 PRBC overnight. Plt and heparin therapy discussed with resident MD.   Resp: Vent settings unchanged, FiO2 decreased to 40% overnight. Small amount of sami secretions from ETT.   GI: + BS, no BM overnight.  : UOP  mL/hr.   Lytes: K+ replaced x1.   Plan: Continue to monitor. Address concerns with CSI.

## 2019-09-12 NOTE — PROGRESS NOTES
Cardiology Progress Note    Assessment & Plan      59 year old female with a history of hypertension, hyperlipidemia, sleep apnea, morbid obesity, and chronic bronchitis who presents to the emergency department via EMS for evaluation of respiratory distress s/p intubation and PEA arrest with CTA consistent with bilateral PE transferred here for [facility] ECMO thrombectomy and catheter directed lytics on 9/9.    Plan for today  - turn down   - monitor blood counts currently dilutional   - increase free water flushes  - wean NE  - decrease versed and increase precedex  -gentle diuresis    Neurology: Intubated, sedated  --continue versed, fentanyl propofol in favor of fentanyl   - initial CTH no acute changes, moving when sedation is weaned down   - repeat CTH today or tomorrow   Cardiovascular / Hemodynamics: PEA arrest secondary to massive PE.   CTA-PE study demonstrates bilateral distal main and proximal lobar pulmonary emboli, greater on the right with elevated RV/LV ratio. Peripheral V-A ECMO inserted for hemodynamic support for massive PE. Went to cath lab and then to IR for suction mechanical thrombectomy. Initial LA 8.6   TTE: dilated RV, severely reduced.   EKG: sinus tachy with frequent PAC's  --wean pressors/inotropes as able  --ACT goal 160-180  --hold lipitor for now given likely hepatic injury during arrest  --hold ACE/ARB for now given likely reduced renal fxn after arrest  --holding beta blocker given shock   - slowly weaning off pressors -on 0.02NE   - turn down today given  ACT at range   Pulmonary: PEA arrest secondary to massive PE. See above.   Vent Settings:  ETT in appropriate place.  Now weaning vent requirements.  CXR: Lines in stable position.   --wean vent as able  --daily CXR  - s/p thrombectomy and catheter directed lytics 9/9-9/10  Chest CTSubpleural groundglass attenuation in the right lung, possibly  pulmonary infarcts given previously seen extensive pulmonary emboli.   Patchy nodularity in the  right base suggesting aspiration.  - sputum culture + Staph 9/10  --scheduled duonebs   GI and Nutrition: No known medical hx.    --monitor BID LFTs  - postpyloric TF  --GI Prophylaxis: PPI    Renal, Fluid and Electrolytes: Cr 1.4, close to baseline  - UOP continue to monitor.  --monitor urine output  --maintain K>3 and Mg>2    Infectious Disease: Concern for aspiration pneumonia  . Blood cultures collected.   Sputum cx + staph 9/10  --vancomycin/zosyn   --daily blood cultures  --monitor for signs of infection given cooling, lines, and leukocytosis   Hematology and Oncology: Receiving heparin for ECMO and  PE.   --cryo PRN fibrinogen < 200; FFP for INR >2  --Transfuse for Hgb<10  --heparin gtt for ECMO with ACT goal 160-180  --US LE w/ arterial duplex per ECMO protocol   --DVT PPX: Heparin as above  - course complicated by DIC with fibrinogen of 82 on 9/10 got 5 unit(s) of cryo   Endocrinology: No known medical history. BG elevated.  --insulin gtt   Lines: R femoral arterial and venous ECMO cannulae September 9, 2019  L femoral arterial line September 9, 2019  R radial arterial line September 9, 2019  ETT September 9, 2019  Underwood catheter September 9, 2019  OG tube September 9, 2019  Restraint: needed    Current lines are required for patient management       Family update by me today: Yes      Code Status:     The pt was discussed and evaluated with Dr. Stuart, Cardiologist, attending physician, who agrees with the assessment and plan above.     Elvira Rashid Chilel    Interval History   Moving all extremities   Weaned down slightly her sedation with the use of precedex  Turn down yesterday limited by    Staph in sputum     Ventilation Mode: CMV/AC  (Continuous Mandatory Ventilation/ Assist Control)  FiO2 (%): 40 %  Rate Set (breaths/minute): 12 breaths/min  Tidal Volume Set (mL): 450 mL  PEEP (cm H2O): 8 cmH2O  Oxygen Concentration (%): 40 %  Resp: 13        Physical Exam   Temp: 98.6  F (37  C)  "Temp src: Esophageal     Heart Rate: 65 Resp: 13 SpO2: 99 % O2 Device: Mechanical Ventilator    Vitals:    09/10/19 0600 09/11/19 0530 09/12/19 0400   Weight: (!) 183.1 kg (403 lb 10.6 oz) (!) 180 kg (396 lb 13.3 oz) (!) 177 kg (390 lb 3.4 oz)     Vital Signs with Ranges  Temp:  [96.3  F (35.7  C)-99.1  F (37.3  C)] 98.6  F (37  C)  Heart Rate:  [] 65  Resp:  [12-19] 13  MAP:  [63 mmHg-95 mmHg] 76 mmHg  Arterial Line BP: ()/(53-81) 110/62  FiO2 (%):  [40 %-45 %] 40 %  SpO2:  [90 %-100 %] 99 %  I/O last 3 completed shifts:  In: 5772.5 [I.V.:2417.5; NG/GT:915]  Out: 4445 [Urine:3495; Emesis/NG output:950]    Heart Rate: 65, Pulse 86, temperature 98.6  F (37  C), resp. rate 13, height 1.676 m (5' 5.98\"), weight (!) 177 kg (390 lb 3.4 oz), SpO2 99 %, not currently breastfeeding.  390 lbs 3.43 oz  GEN:  Morbidly obese sedated and intubated  CV:  Distant heart sounds   LUNGS:  Decreased breath sounds with bilateral wheezing   ABD:  Active bowel sounds, soft, non-tender/non-distended.  No rebound/guarding/rigidity.  EXT:  No edema or cyanosis.  ECMO canula in place with only scant oozing   Underwood in place    Medications     cisatracurium (NIMBEX) infusion ADULT       dexmedetomidine 0.5 mcg/kg/hr (09/12/19 0800)     IV fluid REPLACEMENT ONLY       IV fluid REPLACEMENT ONLY       EPINEPHrine IV infusion ADULT Stopped (09/10/19 0625)     fentaNYL 75 mcg/hr (09/12/19 0800)     heparin 2 unit/mL in 0.9% NaCl 3 mL/hr (09/11/19 1800)     HEParin 2,100 Units/hr (09/12/19 0800)     insulin (regular) 4 Units/hr (09/12/19 0800)     midazolam 4 mg/hr (09/12/19 0800)     nitroPRUsside (NIPRIDE) IV infusion ADULT/PEDS GREATER than or EQUAL to 45 kg std conc       - MEDICATION INSTRUCTIONS -       norepinephrine 0.02 mcg/kg/min (09/12/19 0800)     - MEDICATION INSTRUCTIONS -       phenylephrine       propofol (DIPRIVAN) infusion Stopped (09/11/19 0940)     sodium chloride Stopped (09/10/19 2005)     vasopressin (PITRESSIN) " infusion ADULT (40 mL) Stopped (09/11/19 1700)       albumin human         albuterol  2.5 mg Nebulization Q4H     artificial tears   Both Eyes Q8H     multivitamins w/minerals  15 mL Per Feeding Tube Daily     pantoprazole (PROTONIX) IV  40 mg Intravenous Daily     piperacillin-tazobactam  3.375 g Intravenous Q6H     sodium chloride (PF)  3 mL Intracatheter Q8H     vancomycin place zelaya - receiving intermittent dosing  1 each Intravenous See Admin Instructions     vitamin B1  100 mg Oral or Feeding Tube Daily       Data   Recent Labs   Lab 09/12/19  0342 09/11/19  2315 09/11/19  2157 09/11/19  1600   WBC 18.0* 22.3* Canceled, Test credited 26.7*   HGB 8.4* 9.2* Canceled, Test credited 9.8*   MCV 94 94 Canceled, Test credited 94   PLT 79* 83* Canceled, Test credited 107*   INR 1.30*  --  1.37* 1.36*   *  --  148* 148*   POTASSIUM 3.8  --  4.0 4.2   CHLORIDE 116*  --  114* 113*   CO2 28  --  27 26   BUN 45*  --  49* 47*   CR 1.43*  --  1.49* 1.65*   ANIONGAP 4  --  6 8   MARIA INES 7.8*  --  8.0* 7.9*   *  134*  --  135*  141* 153*  163*   ALBUMIN 1.9*  --  2.0* 2.1*   PROTTOTAL 4.7*  --  4.9* 5.0*   BILITOTAL 0.5  --  0.5 0.5   ALKPHOS 47  --  46 49   ALT 73*  --  85* 97*   AST 35  --  45 55*   TROPI 0.058*  --  0.088* 0.113*       Recent Results (from the past 24 hour(s))   XR Chest Port 1 View    Narrative    XR CHEST PORT 1 VW  9/11/2019 8:44 AM      HISTORY: ECMO and intubated    COMPARISON: Same-day chest radiograph    FINDINGS:   Single AP radiograph of the chest. Underpenetrated study. Endotracheal  tube tip is positioned over the mid thoracic trachea. Right IJ central  line with tip overlying the mid to low SVC. Enteric and feeding tubes  course beyond the field-of-view, with the enteric tube sidehole  projecting over the left upper quadrant. Inferior approach ECMO  catheter overlying the right atrium. Unchanged cardiomegaly. Lung  volumes are similar with unchanged diffuse mixed interstitial  and  patchy airspace opacities. New left upper lobe airspace consolidation.  Unchanged retrocardiac opacities. Unchanged small left pleural  effusion. No pneumothoraces.      Impression    IMPRESSION:   1. New left upper lobe airspace consolidation  2. Unchanged left basilar opacities and pleural effusion.  3. Stable support devices.    I have personally reviewed the examination and initial interpretation  and I agree with the findings.    DANY GIRALDO MD         I have seen and examined the patient with the [facility] team. I agree with the assessment and plan of the note above.I have reviewed pertinent labs.     Raffaele Stuart MD  Interventional Cardiology  Pager: [phone]

## 2019-09-13 ENCOUNTER — APPOINTMENT (OUTPATIENT)
Dept: GENERAL RADIOLOGY | Facility: CLINIC | Age: 59
DRG: 003 | End: 2019-09-13
Payer: COMMERCIAL

## 2019-09-13 ENCOUNTER — APPOINTMENT (OUTPATIENT)
Dept: GENERAL RADIOLOGY | Facility: CLINIC | Age: 59
DRG: 003 | End: 2019-09-13
Attending: INTERNAL MEDICINE
Payer: COMMERCIAL

## 2019-09-13 ENCOUNTER — ANESTHESIA EVENT (OUTPATIENT)
Dept: INTENSIVE CARE | Facility: CLINIC | Age: 59
End: 2019-09-13

## 2019-09-13 ENCOUNTER — ANESTHESIA (OUTPATIENT)
Dept: INTENSIVE CARE | Facility: CLINIC | Age: 59
End: 2019-09-13

## 2019-09-13 LAB
ALBUMIN SERPL-MCNC: 1.9 G/DL (ref 3.4–5)
ALBUMIN SERPL-MCNC: 2 G/DL (ref 3.4–5)
ALBUMIN SERPL-MCNC: 2 G/DL (ref 3.4–5)
ALBUMIN SERPL-MCNC: 2.1 G/DL (ref 3.4–5)
ALP SERPL-CCNC: 49 U/L (ref 40–150)
ALP SERPL-CCNC: 51 U/L (ref 40–150)
ALP SERPL-CCNC: 61 U/L (ref 40–150)
ALP SERPL-CCNC: 67 U/L (ref 40–150)
ALT SERPL W P-5'-P-CCNC: 44 U/L (ref 0–50)
ALT SERPL W P-5'-P-CCNC: 45 U/L (ref 0–50)
ALT SERPL W P-5'-P-CCNC: 49 U/L (ref 0–50)
ALT SERPL W P-5'-P-CCNC: 51 U/L (ref 0–50)
ANION GAP SERPL CALCULATED.3IONS-SCNC: 4 MMOL/L (ref 3–14)
ANION GAP SERPL CALCULATED.3IONS-SCNC: 5 MMOL/L (ref 3–14)
ANION GAP SERPL CALCULATED.3IONS-SCNC: 6 MMOL/L (ref 3–14)
ANION GAP SERPL CALCULATED.3IONS-SCNC: 6 MMOL/L (ref 3–14)
APTT PPP: 80 SEC (ref 22–37)
APTT PPP: 84 SEC (ref 22–37)
APTT PPP: 90 SEC (ref 22–37)
APTT PPP: 97 SEC (ref 22–37)
AST SERPL W P-5'-P-CCNC: 14 U/L (ref 0–45)
AST SERPL W P-5'-P-CCNC: 16 U/L (ref 0–45)
AST SERPL W P-5'-P-CCNC: 18 U/L (ref 0–45)
AST SERPL W P-5'-P-CCNC: 20 U/L (ref 0–45)
AT III ACT/NOR PPP CHRO: 62 % (ref 85–135)
BACTERIA SPEC CULT: ABNORMAL
BASE EXCESS BLDA CALC-SCNC: 2.5 MMOL/L
BASE EXCESS BLDA CALC-SCNC: 3.3 MMOL/L
BASE EXCESS BLDA CALC-SCNC: 3.6 MMOL/L
BASE EXCESS BLDA CALC-SCNC: 3.8 MMOL/L
BASE EXCESS BLDA CALC-SCNC: 3.9 MMOL/L
BASE EXCESS BLDA CALC-SCNC: 3.9 MMOL/L
BASE EXCESS BLDA CALC-SCNC: 4 MMOL/L
BASE EXCESS BLDA CALC-SCNC: 4.3 MMOL/L
BASE EXCESS BLDA CALC-SCNC: 4.4 MMOL/L
BASE EXCESS BLDA CALC-SCNC: 4.4 MMOL/L
BASE EXCESS BLDA CALC-SCNC: 4.5 MMOL/L
BASE EXCESS BLDA CALC-SCNC: 4.5 MMOL/L
BASE EXCESS BLDA CALC-SCNC: 4.6 MMOL/L
BASE EXCESS BLDA CALC-SCNC: 4.7 MMOL/L
BASE EXCESS BLDA CALC-SCNC: 4.7 MMOL/L
BASE EXCESS BLDV CALC-SCNC: 4.7 MMOL/L
BASE EXCESS BLDV CALC-SCNC: 4.9 MMOL/L
BILIRUB SERPL-MCNC: 0.4 MG/DL (ref 0.2–1.3)
BILIRUB SERPL-MCNC: 0.4 MG/DL (ref 0.2–1.3)
BILIRUB SERPL-MCNC: 0.5 MG/DL (ref 0.2–1.3)
BILIRUB SERPL-MCNC: 0.6 MG/DL (ref 0.2–1.3)
BLD PROD TYP BPU: NORMAL
BLD UNIT ID BPU: 0
BLOOD PRODUCT CODE: NORMAL
BPU ID: NORMAL
BUN SERPL-MCNC: 38 MG/DL (ref 7–30)
BUN SERPL-MCNC: 41 MG/DL (ref 7–30)
BUN SERPL-MCNC: 41 MG/DL (ref 7–30)
BUN SERPL-MCNC: 46 MG/DL (ref 7–30)
CA-I BLD-MCNC: 4.7 MG/DL (ref 4.4–5.2)
CA-I BLD-MCNC: 4.8 MG/DL (ref 4.4–5.2)
CALCIUM SERPL-MCNC: 8.2 MG/DL (ref 8.5–10.1)
CALCIUM SERPL-MCNC: 8.3 MG/DL (ref 8.5–10.1)
CHLORIDE SERPL-SCNC: 112 MMOL/L (ref 94–109)
CHLORIDE SERPL-SCNC: 113 MMOL/L (ref 94–109)
CHLORIDE SERPL-SCNC: 113 MMOL/L (ref 94–109)
CHLORIDE SERPL-SCNC: 114 MMOL/L (ref 94–109)
CO2 SERPL-SCNC: 27 MMOL/L (ref 20–32)
CO2 SERPL-SCNC: 28 MMOL/L (ref 20–32)
CREAT SERPL-MCNC: 0.99 MG/DL (ref 0.52–1.04)
CREAT SERPL-MCNC: 1.03 MG/DL (ref 0.52–1.04)
CREAT SERPL-MCNC: 1.08 MG/DL (ref 0.52–1.04)
CREAT SERPL-MCNC: 1.13 MG/DL (ref 0.52–1.04)
CRP SERPL-MCNC: 97 MG/L (ref 0–8)
D DIMER PPP FEU-MCNC: 5 UG/ML FEU (ref 0–0.5)
D DIMER PPP FEU-MCNC: 5.5 UG/ML FEU (ref 0–0.5)
ERYTHROCYTE [DISTWIDTH] IN BLOOD BY AUTOMATED COUNT: 16 % (ref 10–15)
ERYTHROCYTE [DISTWIDTH] IN BLOOD BY AUTOMATED COUNT: 16.1 % (ref 10–15)
ERYTHROCYTE [DISTWIDTH] IN BLOOD BY AUTOMATED COUNT: 16.5 % (ref 10–15)
ERYTHROCYTE [DISTWIDTH] IN BLOOD BY AUTOMATED COUNT: 16.9 % (ref 10–15)
ERYTHROCYTE [SEDIMENTATION RATE] IN BLOOD BY WESTERGREN METHOD: 65 MM/H (ref 0–30)
FIBRINOGEN PPP-MCNC: 666 MG/DL (ref 200–420)
FIBRINOGEN PPP-MCNC: 684 MG/DL (ref 200–420)
GFR SERPL CREATININE-BSD FRML MDRD: 53 ML/MIN/{1.73_M2}
GFR SERPL CREATININE-BSD FRML MDRD: 56 ML/MIN/{1.73_M2}
GFR SERPL CREATININE-BSD FRML MDRD: 60 ML/MIN/{1.73_M2}
GFR SERPL CREATININE-BSD FRML MDRD: 63 ML/MIN/{1.73_M2}
GLUCOSE BLD-MCNC: 116 MG/DL (ref 70–99)
GLUCOSE BLD-MCNC: 118 MG/DL (ref 70–99)
GLUCOSE BLD-MCNC: 120 MG/DL (ref 70–99)
GLUCOSE BLD-MCNC: 125 MG/DL (ref 70–99)
GLUCOSE BLDC GLUCOMTR-MCNC: 105 MG/DL (ref 70–99)
GLUCOSE BLDC GLUCOMTR-MCNC: 109 MG/DL (ref 70–99)
GLUCOSE BLDC GLUCOMTR-MCNC: 112 MG/DL (ref 70–99)
GLUCOSE BLDC GLUCOMTR-MCNC: 115 MG/DL (ref 70–99)
GLUCOSE BLDC GLUCOMTR-MCNC: 115 MG/DL (ref 70–99)
GLUCOSE BLDC GLUCOMTR-MCNC: 116 MG/DL (ref 70–99)
GLUCOSE BLDC GLUCOMTR-MCNC: 119 MG/DL (ref 70–99)
GLUCOSE BLDC GLUCOMTR-MCNC: 121 MG/DL (ref 70–99)
GLUCOSE BLDC GLUCOMTR-MCNC: 121 MG/DL (ref 70–99)
GLUCOSE BLDC GLUCOMTR-MCNC: 122 MG/DL (ref 70–99)
GLUCOSE BLDC GLUCOMTR-MCNC: 124 MG/DL (ref 70–99)
GLUCOSE BLDC GLUCOMTR-MCNC: 134 MG/DL (ref 70–99)
GLUCOSE BLDC GLUCOMTR-MCNC: 75 MG/DL (ref 70–99)
GLUCOSE SERPL-MCNC: 111 MG/DL (ref 70–99)
GLUCOSE SERPL-MCNC: 115 MG/DL (ref 70–99)
GLUCOSE SERPL-MCNC: 117 MG/DL (ref 70–99)
GLUCOSE SERPL-MCNC: 119 MG/DL (ref 70–99)
HCO3 BLD-SCNC: 28 MMOL/L (ref 21–28)
HCO3 BLD-SCNC: 29 MMOL/L (ref 21–28)
HCO3 BLDA-SCNC: 28 MMOL/L (ref 21–28)
HCO3 BLDA-SCNC: 28 MMOL/L (ref 21–28)
HCO3 BLDV-SCNC: 29 MMOL/L (ref 21–28)
HCO3 BLDV-SCNC: 29 MMOL/L (ref 21–28)
HCT VFR BLD AUTO: 26.4 % (ref 35–47)
HCT VFR BLD AUTO: 26.7 % (ref 35–47)
HCT VFR BLD AUTO: 27.7 % (ref 35–47)
HCT VFR BLD AUTO: 28.3 % (ref 35–47)
HGB BLD-MCNC: 8.2 G/DL (ref 11.7–15.7)
HGB BLD-MCNC: 8.4 G/DL (ref 11.7–15.7)
HGB BLD-MCNC: 8.6 G/DL (ref 11.7–15.7)
HGB BLD-MCNC: 8.6 G/DL (ref 11.7–15.7)
HGB FREE PLAS-MCNC: <30 MG/DL
INR PPP: 1.24 (ref 0.86–1.14)
INR PPP: 1.25 (ref 0.86–1.14)
INR PPP: 1.26 (ref 0.86–1.14)
INR PPP: 1.26 (ref 0.86–1.14)
KCT BLD-ACNC: 155 SEC (ref 75–150)
KCT BLD-ACNC: 159 SEC (ref 75–150)
KCT BLD-ACNC: 167 SEC (ref 75–150)
KCT BLD-ACNC: 167 SEC (ref 75–150)
KCT BLD-ACNC: 175 SEC (ref 75–150)
KCT BLD-ACNC: 179 SEC (ref 75–150)
KCT BLD-ACNC: 179 SEC (ref 75–150)
KCT BLD-ACNC: 183 SEC (ref 75–150)
KCT BLD-ACNC: 183 SEC (ref 75–150)
KCT BLD-ACNC: 187 SEC (ref 75–150)
LACTATE BLD-SCNC: 0.5 MMOL/L (ref 0.7–2)
LACTATE BLD-SCNC: 0.6 MMOL/L (ref 0.7–2)
LDH SERPL L TO P-CCNC: 491 U/L (ref 81–234)
LMWH PPP CHRO-ACNC: 0.39 IU/ML
LMWH PPP CHRO-ACNC: 0.46 IU/ML
LMWH PPP CHRO-ACNC: 0.46 IU/ML
LMWH PPP CHRO-ACNC: 0.62 IU/ML
MAGNESIUM SERPL-MCNC: 2.1 MG/DL (ref 1.6–2.3)
MAGNESIUM SERPL-MCNC: 2.2 MG/DL (ref 1.6–2.3)
MCH RBC QN AUTO: 28.6 PG (ref 26.5–33)
MCH RBC QN AUTO: 29.4 PG (ref 26.5–33)
MCH RBC QN AUTO: 29.6 PG (ref 26.5–33)
MCH RBC QN AUTO: 29.8 PG (ref 26.5–33)
MCHC RBC AUTO-ENTMCNC: 30.4 G/DL (ref 31.5–36.5)
MCHC RBC AUTO-ENTMCNC: 31 G/DL (ref 31.5–36.5)
MCHC RBC AUTO-ENTMCNC: 31.1 G/DL (ref 31.5–36.5)
MCHC RBC AUTO-ENTMCNC: 31.5 G/DL (ref 31.5–36.5)
MCV RBC AUTO: 94 FL (ref 78–100)
MCV RBC AUTO: 95 FL (ref 78–100)
O2/TOTAL GAS SETTING VFR VENT: 50 %
OXYHGB MFR BLD: 94 % (ref 92–100)
OXYHGB MFR BLD: 94 % (ref 92–100)
OXYHGB MFR BLD: 95 % (ref 92–100)
OXYHGB MFR BLD: 96 % (ref 92–100)
OXYHGB MFR BLD: 96 % (ref 92–100)
OXYHGB MFR BLD: 97 % (ref 92–100)
OXYHGB MFR BLDA: 99 % (ref 75–100)
OXYHGB MFR BLDA: 99 % (ref 75–100)
OXYHGB MFR BLDV: 85 %
OXYHGB MFR BLDV: 86 %
PCO2 BLD: 41 MM HG (ref 35–45)
PCO2 BLD: 42 MM HG (ref 35–45)
PCO2 BLD: 42 MM HG (ref 35–45)
PCO2 BLD: 43 MM HG (ref 35–45)
PCO2 BLD: 44 MM HG (ref 35–45)
PCO2 BLD: 44 MM HG (ref 35–45)
PCO2 BLD: 45 MM HG (ref 35–45)
PCO2 BLDA: 35 MM HG (ref 35–45)
PCO2 BLDA: 36 MM HG (ref 35–45)
PCO2 BLDV: 42 MM HG (ref 40–50)
PCO2 BLDV: 42 MM HG (ref 40–50)
PH BLD: 7.4 PH (ref 7.35–7.45)
PH BLD: 7.43 PH (ref 7.35–7.45)
PH BLD: 7.44 PH (ref 7.35–7.45)
PH BLD: 7.45 PH (ref 7.35–7.45)
PH BLDA: 7.49 PH (ref 7.35–7.45)
PH BLDA: 7.5 PH (ref 7.35–7.45)
PH BLDV: 7.45 PH (ref 7.32–7.43)
PH BLDV: 7.45 PH (ref 7.32–7.43)
PHOSPHATE SERPL-MCNC: 3.5 MG/DL (ref 2.5–4.5)
PLATELET # BLD AUTO: 84 10E9/L (ref 150–450)
PLATELET # BLD AUTO: 84 10E9/L (ref 150–450)
PLATELET # BLD AUTO: 91 10E9/L (ref 150–450)
PLATELET # BLD AUTO: 94 10E9/L (ref 150–450)
PO2 BLD: 102 MM HG (ref 80–105)
PO2 BLD: 102 MM HG (ref 80–105)
PO2 BLD: 76 MM HG (ref 80–105)
PO2 BLD: 81 MM HG (ref 80–105)
PO2 BLD: 82 MM HG (ref 80–105)
PO2 BLD: 83 MM HG (ref 80–105)
PO2 BLD: 83 MM HG (ref 80–105)
PO2 BLD: 84 MM HG (ref 80–105)
PO2 BLD: 89 MM HG (ref 80–105)
PO2 BLD: 90 MM HG (ref 80–105)
PO2 BLD: 96 MM HG (ref 80–105)
PO2 BLDA: 236 MM HG (ref 80–105)
PO2 BLDA: 276 MM HG (ref 80–105)
PO2 BLDV: 52 MM HG (ref 25–47)
PO2 BLDV: 52 MM HG (ref 25–47)
POTASSIUM SERPL-SCNC: 3.9 MMOL/L (ref 3.4–5.3)
POTASSIUM SERPL-SCNC: 4 MMOL/L (ref 3.4–5.3)
POTASSIUM SERPL-SCNC: 4.3 MMOL/L (ref 3.4–5.3)
POTASSIUM SERPL-SCNC: 4.4 MMOL/L (ref 3.4–5.3)
PROT SERPL-MCNC: 5.2 G/DL (ref 6.8–8.8)
RADIOLOGIST FLAGS: ABNORMAL
RBC # BLD AUTO: 2.77 10E12/L (ref 3.8–5.2)
RBC # BLD AUTO: 2.82 10E12/L (ref 3.8–5.2)
RBC # BLD AUTO: 2.93 10E12/L (ref 3.8–5.2)
RBC # BLD AUTO: 3.01 10E12/L (ref 3.8–5.2)
SODIUM SERPL-SCNC: 146 MMOL/L (ref 133–144)
SODIUM SERPL-SCNC: 147 MMOL/L (ref 133–144)
SPECIMEN SOURCE: ABNORMAL
SPECIMEN SOURCE: ABNORMAL
TRANSFUSION STATUS PATIENT QL: NORMAL
TRANSFUSION STATUS PATIENT QL: NORMAL
TROPONIN I SERPL-MCNC: 0.03 UG/L (ref 0–0.04)
TROPONIN I SERPL-MCNC: 0.03 UG/L (ref 0–0.04)
TROPONIN I SERPL-MCNC: 0.19 UG/L (ref 0–0.04)
TROPONIN I SERPL-MCNC: <0.015 UG/L (ref 0–0.04)
VANCOMYCIN SERPL-MCNC: 13.6 MG/L
WBC # BLD AUTO: 12.1 10E9/L (ref 4–11)
WBC # BLD AUTO: 12.7 10E9/L (ref 4–11)
WBC # BLD AUTO: 13.1 10E9/L (ref 4–11)
WBC # BLD AUTO: 13.7 10E9/L (ref 4–11)

## 2019-09-13 PROCEDURE — 84100 ASSAY OF PHOSPHORUS: CPT | Performed by: STUDENT IN AN ORGANIZED HEALTH CARE EDUCATION/TRAINING PROGRAM

## 2019-09-13 PROCEDURE — 36415 COLL VENOUS BLD VENIPUNCTURE: CPT | Performed by: INTERNAL MEDICINE

## 2019-09-13 PROCEDURE — 71045 X-RAY EXAM CHEST 1 VIEW: CPT | Mod: 77

## 2019-09-13 PROCEDURE — 25000125 ZZHC RX 250: Performed by: STUDENT IN AN ORGANIZED HEALTH CARE EDUCATION/TRAINING PROGRAM

## 2019-09-13 PROCEDURE — 83735 ASSAY OF MAGNESIUM: CPT | Performed by: STUDENT IN AN ORGANIZED HEALTH CARE EDUCATION/TRAINING PROGRAM

## 2019-09-13 PROCEDURE — 25000128 H RX IP 250 OP 636: Performed by: INTERNAL MEDICINE

## 2019-09-13 PROCEDURE — 87040 BLOOD CULTURE FOR BACTERIA: CPT | Performed by: INTERNAL MEDICINE

## 2019-09-13 PROCEDURE — 85610 PROTHROMBIN TIME: CPT | Performed by: STUDENT IN AN ORGANIZED HEALTH CARE EDUCATION/TRAINING PROGRAM

## 2019-09-13 PROCEDURE — 85347 COAGULATION TIME ACTIVATED: CPT

## 2019-09-13 PROCEDURE — 85520 HEPARIN ASSAY: CPT | Performed by: STUDENT IN AN ORGANIZED HEALTH CARE EDUCATION/TRAINING PROGRAM

## 2019-09-13 PROCEDURE — 85384 FIBRINOGEN ACTIVITY: CPT | Performed by: STUDENT IN AN ORGANIZED HEALTH CARE EDUCATION/TRAINING PROGRAM

## 2019-09-13 PROCEDURE — 83605 ASSAY OF LACTIC ACID: CPT | Performed by: INTERNAL MEDICINE

## 2019-09-13 PROCEDURE — 85652 RBC SED RATE AUTOMATED: CPT | Performed by: STUDENT IN AN ORGANIZED HEALTH CARE EDUCATION/TRAINING PROGRAM

## 2019-09-13 PROCEDURE — 94640 AIRWAY INHALATION TREATMENT: CPT | Mod: 76

## 2019-09-13 PROCEDURE — 27210437 ZZH NUTRITION PRODUCT SEMIELEM INTERMED LITER

## 2019-09-13 PROCEDURE — 84484 ASSAY OF TROPONIN QUANT: CPT | Performed by: STUDENT IN AN ORGANIZED HEALTH CARE EDUCATION/TRAINING PROGRAM

## 2019-09-13 PROCEDURE — 85730 THROMBOPLASTIN TIME PARTIAL: CPT | Performed by: STUDENT IN AN ORGANIZED HEALTH CARE EDUCATION/TRAINING PROGRAM

## 2019-09-13 PROCEDURE — 25000128 H RX IP 250 OP 636: Performed by: STUDENT IN AN ORGANIZED HEALTH CARE EDUCATION/TRAINING PROGRAM

## 2019-09-13 PROCEDURE — 25800030 ZZH RX IP 258 OP 636: Performed by: INTERNAL MEDICINE

## 2019-09-13 PROCEDURE — 82805 BLOOD GASES W/O2 SATURATION: CPT | Performed by: INTERNAL MEDICINE

## 2019-09-13 PROCEDURE — 40000014 ZZH STATISTIC ARTERIAL MONITORING DAILY

## 2019-09-13 PROCEDURE — 87070 CULTURE OTHR SPECIMN AEROBIC: CPT | Performed by: INTERNAL MEDICINE

## 2019-09-13 PROCEDURE — 80053 COMPREHEN METABOLIC PANEL: CPT | Performed by: STUDENT IN AN ORGANIZED HEALTH CARE EDUCATION/TRAINING PROGRAM

## 2019-09-13 PROCEDURE — 83051 HEMOGLOBIN PLASMA: CPT | Performed by: STUDENT IN AN ORGANIZED HEALTH CARE EDUCATION/TRAINING PROGRAM

## 2019-09-13 PROCEDURE — 40000275 ZZH STATISTIC RCP TIME EA 10 MIN

## 2019-09-13 PROCEDURE — 71045 X-RAY EXAM CHEST 1 VIEW: CPT

## 2019-09-13 PROCEDURE — 82330 ASSAY OF CALCIUM: CPT | Performed by: STUDENT IN AN ORGANIZED HEALTH CARE EDUCATION/TRAINING PROGRAM

## 2019-09-13 PROCEDURE — 00000146 ZZHCL STATISTIC GLUCOSE BY METER IP

## 2019-09-13 PROCEDURE — 25800030 ZZH RX IP 258 OP 636: Performed by: STUDENT IN AN ORGANIZED HEALTH CARE EDUCATION/TRAINING PROGRAM

## 2019-09-13 PROCEDURE — 86140 C-REACTIVE PROTEIN: CPT | Performed by: STUDENT IN AN ORGANIZED HEALTH CARE EDUCATION/TRAINING PROGRAM

## 2019-09-13 PROCEDURE — 94003 VENT MGMT INPAT SUBQ DAY: CPT

## 2019-09-13 PROCEDURE — 82805 BLOOD GASES W/O2 SATURATION: CPT | Performed by: STUDENT IN AN ORGANIZED HEALTH CARE EDUCATION/TRAINING PROGRAM

## 2019-09-13 PROCEDURE — 99233 SBSQ HOSP IP/OBS HIGH 50: CPT | Mod: 25 | Performed by: INTERNAL MEDICINE

## 2019-09-13 PROCEDURE — 85027 COMPLETE CBC AUTOMATED: CPT | Performed by: STUDENT IN AN ORGANIZED HEALTH CARE EDUCATION/TRAINING PROGRAM

## 2019-09-13 PROCEDURE — 85379 FIBRIN DEGRADATION QUANT: CPT | Performed by: STUDENT IN AN ORGANIZED HEALTH CARE EDUCATION/TRAINING PROGRAM

## 2019-09-13 PROCEDURE — 20000004 ZZH R&B ICU UMMC

## 2019-09-13 PROCEDURE — 33949 ECMO/ECLS DAILY MGMT ARTERY: CPT | Performed by: INTERNAL MEDICINE

## 2019-09-13 PROCEDURE — 85300 ANTITHROMBIN III ACTIVITY: CPT | Performed by: STUDENT IN AN ORGANIZED HEALTH CARE EDUCATION/TRAINING PROGRAM

## 2019-09-13 PROCEDURE — 25000132 ZZH RX MED GY IP 250 OP 250 PS 637: Performed by: INTERNAL MEDICINE

## 2019-09-13 PROCEDURE — 94640 AIRWAY INHALATION TREATMENT: CPT

## 2019-09-13 PROCEDURE — 25000132 ZZH RX MED GY IP 250 OP 250 PS 637: Performed by: STUDENT IN AN ORGANIZED HEALTH CARE EDUCATION/TRAINING PROGRAM

## 2019-09-13 PROCEDURE — 33949 ECMO/ECLS DAILY MGMT ARTERY: CPT

## 2019-09-13 PROCEDURE — 82330 ASSAY OF CALCIUM: CPT | Performed by: INTERNAL MEDICINE

## 2019-09-13 PROCEDURE — 82947 ASSAY GLUCOSE BLOOD QUANT: CPT | Performed by: INTERNAL MEDICINE

## 2019-09-13 PROCEDURE — P9016 RBC LEUKOCYTES REDUCED: HCPCS | Performed by: INTERNAL MEDICINE

## 2019-09-13 PROCEDURE — 83605 ASSAY OF LACTIC ACID: CPT | Performed by: STUDENT IN AN ORGANIZED HEALTH CARE EDUCATION/TRAINING PROGRAM

## 2019-09-13 PROCEDURE — 82947 ASSAY GLUCOSE BLOOD QUANT: CPT | Performed by: STUDENT IN AN ORGANIZED HEALTH CARE EDUCATION/TRAINING PROGRAM

## 2019-09-13 PROCEDURE — 80202 ASSAY OF VANCOMYCIN: CPT | Performed by: INTERNAL MEDICINE

## 2019-09-13 PROCEDURE — 83615 LACTATE (LD) (LDH) ENZYME: CPT | Performed by: STUDENT IN AN ORGANIZED HEALTH CARE EDUCATION/TRAINING PROGRAM

## 2019-09-13 RX ADMIN — MIDAZOLAM 4 MG/HR: 5 INJECTION INTRAMUSCULAR; INTRAVENOUS at 03:12

## 2019-09-13 RX ADMIN — DEXMEDETOMIDINE 0.7 MCG/KG/HR: 100 INJECTION, SOLUTION, CONCENTRATE INTRAVENOUS at 08:52

## 2019-09-13 RX ADMIN — MIDAZOLAM HYDROCHLORIDE 2 MG: 2 INJECTION, SOLUTION INTRAMUSCULAR; INTRAVENOUS at 15:18

## 2019-09-13 RX ADMIN — MULTIVITAMIN 15 ML: LIQUID ORAL at 08:55

## 2019-09-13 RX ADMIN — POTASSIUM CHLORIDE 20 MEQ: 1.5 POWDER, FOR SOLUTION ORAL at 06:13

## 2019-09-13 RX ADMIN — IPRATROPIUM BROMIDE AND ALBUTEROL SULFATE 3 ML: .5; 3 SOLUTION RESPIRATORY (INHALATION) at 11:36

## 2019-09-13 RX ADMIN — PIPERACILLIN SODIUM AND TAZOBACTAM SODIUM 3.38 G: 3; .375 INJECTION, POWDER, LYOPHILIZED, FOR SOLUTION INTRAVENOUS at 05:04

## 2019-09-13 RX ADMIN — MIDAZOLAM HYDROCHLORIDE 2 MG: 2 INJECTION, SOLUTION INTRAMUSCULAR; INTRAVENOUS at 06:00

## 2019-09-13 RX ADMIN — PIPERACILLIN SODIUM AND TAZOBACTAM SODIUM 3.38 G: 3; .375 INJECTION, POWDER, LYOPHILIZED, FOR SOLUTION INTRAVENOUS at 16:12

## 2019-09-13 RX ADMIN — Medication 40 MG: at 08:55

## 2019-09-13 RX ADMIN — DEXMEDETOMIDINE 0.7 MCG/KG/HR: 100 INJECTION, SOLUTION, CONCENTRATE INTRAVENOUS at 15:17

## 2019-09-13 RX ADMIN — THIAMINE HCL TAB 100 MG 100 MG: 100 TAB at 08:58

## 2019-09-13 RX ADMIN — MIDAZOLAM HYDROCHLORIDE 2 MG: 2 INJECTION, SOLUTION INTRAMUSCULAR; INTRAVENOUS at 19:49

## 2019-09-13 RX ADMIN — SENNOSIDES AND DOCUSATE SODIUM 1 TABLET: 8.6; 5 TABLET ORAL at 22:54

## 2019-09-13 RX ADMIN — HEPARIN SODIUM 43.84 UNITS/KG/HR: 10000 INJECTION, SOLUTION INTRAVENOUS at 11:02

## 2019-09-13 RX ADMIN — DEXMEDETOMIDINE 0.7 MCG/KG/HR: 100 INJECTION, SOLUTION, CONCENTRATE INTRAVENOUS at 05:05

## 2019-09-13 RX ADMIN — MIDAZOLAM HYDROCHLORIDE 2 MG: 2 INJECTION, SOLUTION INTRAMUSCULAR; INTRAVENOUS at 01:00

## 2019-09-13 RX ADMIN — PIPERACILLIN SODIUM AND TAZOBACTAM SODIUM 3.38 G: 3; .375 INJECTION, POWDER, LYOPHILIZED, FOR SOLUTION INTRAVENOUS at 10:41

## 2019-09-13 RX ADMIN — MIDAZOLAM HYDROCHLORIDE 2 MG: 2 INJECTION, SOLUTION INTRAMUSCULAR; INTRAVENOUS at 08:03

## 2019-09-13 RX ADMIN — FENTANYL CITRATE 50 MCG: 50 INJECTION, SOLUTION INTRAMUSCULAR; INTRAVENOUS at 11:15

## 2019-09-13 RX ADMIN — POTASSIUM CHLORIDE 20 MEQ: 29.8 INJECTION, SOLUTION INTRAVENOUS at 13:03

## 2019-09-13 RX ADMIN — PIPERACILLIN SODIUM AND TAZOBACTAM SODIUM 3.38 G: 3; .375 INJECTION, POWDER, LYOPHILIZED, FOR SOLUTION INTRAVENOUS at 00:00

## 2019-09-13 RX ADMIN — PIPERACILLIN SODIUM AND TAZOBACTAM SODIUM 3.38 G: 3; .375 INJECTION, POWDER, LYOPHILIZED, FOR SOLUTION INTRAVENOUS at 22:54

## 2019-09-13 RX ADMIN — DEXMEDETOMIDINE 0.7 MCG/KG/HR: 100 INJECTION, SOLUTION, CONCENTRATE INTRAVENOUS at 02:07

## 2019-09-13 RX ADMIN — FENTANYL CITRATE 50 MCG: 50 INJECTION, SOLUTION INTRAMUSCULAR; INTRAVENOUS at 15:18

## 2019-09-13 RX ADMIN — FENTANYL CITRATE 50 MCG: 50 INJECTION, SOLUTION INTRAMUSCULAR; INTRAVENOUS at 06:00

## 2019-09-13 RX ADMIN — DOBUTAMINE HYDROCHLORIDE 2.5 MCG/KG/MIN: 200 INJECTION INTRAVENOUS at 07:50

## 2019-09-13 RX ADMIN — IPRATROPIUM BROMIDE AND ALBUTEROL SULFATE 3 ML: .5; 3 SOLUTION RESPIRATORY (INHALATION) at 08:02

## 2019-09-13 RX ADMIN — IPRATROPIUM BROMIDE AND ALBUTEROL SULFATE 3 ML: .5; 3 SOLUTION RESPIRATORY (INHALATION) at 16:13

## 2019-09-13 RX ADMIN — MIDAZOLAM HYDROCHLORIDE 2 MG: 2 INJECTION, SOLUTION INTRAMUSCULAR; INTRAVENOUS at 11:15

## 2019-09-13 RX ADMIN — DEXMEDETOMIDINE 0.7 MCG/KG/HR: 100 INJECTION, SOLUTION, CONCENTRATE INTRAVENOUS at 18:48

## 2019-09-13 RX ADMIN — VANCOMYCIN HYDROCHLORIDE 2750 MG: 10 INJECTION, POWDER, LYOPHILIZED, FOR SOLUTION INTRAVENOUS at 21:31

## 2019-09-13 RX ADMIN — IPRATROPIUM BROMIDE AND ALBUTEROL SULFATE 3 ML: .5; 3 SOLUTION RESPIRATORY (INHALATION) at 19:34

## 2019-09-13 RX ADMIN — Medication 75 MCG/HR: at 14:24

## 2019-09-13 RX ADMIN — DEXMEDETOMIDINE 0.7 MCG/KG/HR: 100 INJECTION, SOLUTION, CONCENTRATE INTRAVENOUS at 11:41

## 2019-09-13 RX ADMIN — FENTANYL CITRATE 50 MCG: 50 INJECTION, SOLUTION INTRAMUSCULAR; INTRAVENOUS at 19:49

## 2019-09-13 RX ADMIN — HEPARIN SODIUM 43.84 UNITS/KG/HR: 10000 INJECTION, SOLUTION INTRAVENOUS at 18:46

## 2019-09-13 RX ADMIN — Medication: at 06:26

## 2019-09-13 RX ADMIN — HUMAN INSULIN 3 UNITS/HR: 100 INJECTION, SOLUTION SUBCUTANEOUS at 05:10

## 2019-09-13 RX ADMIN — DEXMEDETOMIDINE 0.7 MCG/KG/HR: 100 INJECTION, SOLUTION, CONCENTRATE INTRAVENOUS at 21:28

## 2019-09-13 ASSESSMENT — ACTIVITIES OF DAILY LIVING (ADL)
ADLS_ACUITY_SCORE: 15
ADLS_ACUITY_SCORE: 15
ADLS_ACUITY_SCORE: 19
ADLS_ACUITY_SCORE: 15
ADLS_ACUITY_SCORE: 19
ADLS_ACUITY_SCORE: 19

## 2019-09-13 ASSESSMENT — MIFFLIN-ST. JEOR: SCORE: 2436.5

## 2019-09-13 NOTE — PHARMACY-VANCOMYCIN DOSING SERVICE
Pharmacy Vancomycin Note  Date of Service 2019  Patient's  1960   59 year old, female    Indication: Aspiration Pneumonia/ECMO  Goal Trough Level: 15-20 mg/L  Day of Therapy: 3  Current Vancomycin regimen:  intermittent    Current estimated CrCl = Estimated Creatinine Clearance: 81.7 mL/min (A) (based on SCr of 1.26 mg/dL (H)).    Creatinine for last 3 days  9/10/2019:  2:35 AM Creatinine 1.76 mg/dL;  4:40 AM Creatinine 1.77 mg/dL; 10:11 AM Creatinine 1.67 mg/dL;  3:47 PM Creatinine 1.58 mg/dL; 10:06 PM Creatinine 1.63 mg/dL  2019:  3:46 AM Creatinine 1.82 mg/dL; 10:05 AM Creatinine 1.67 mg/dL;  4:00 PM Creatinine 1.65 mg/dL;  9:57 PM Creatinine 1.49 mg/dL  2019:  3:42 AM Creatinine 1.43 mg/dL;  9:41 AM Creatinine 1.25 mg/dL;  3:19 PM Creatinine 1.26 mg/dL    Recent Vancomycin Levels (past 3 days)  2019:  3:46 AM Vancomycin Level 17.2 mg/L  2019:  3:19 PM Vancomycin Level 12.4 mg/L    Vancomycin IV Administrations (past 72 hours)                   vancomycin (VANCOCIN) 2,000 mg in sodium chloride 0.9 % 500 mL intermittent infusion (mg) 2,000 mg New Bag 19 1511                Nephrotoxins and other renal medications (From now, onward)    Start     Dose/Rate Route Frequency Ordered Stop    19  vancomycin (VANCOCIN) 2,750 mg in sodium chloride 0.9 % 250 mL intermittent infusion      2,750 mg (central catheter)  over 180 Minutes Intravenous EVERY 24 HOURS 09/12/19 2019      09/10/19 0445  piperacillin-tazobactam (ZOSYN) 3.375 g vial to attach to  mL bag      3.375 g  over 30 Minutes Intravenous EVERY 6 HOURS 19 2238 09/15/19 0444    09/09/19 224  norepinephrine (LEVOPHED) 16 mg in  mL infusion      0.03-0.4 mcg/kg/min × 178.6 kg  5-67 mL/hr  Intravenous CONTINUOUS 19 224  vasopressin (VASOSTRICT) 40 Units in D5W 40 mL infusion      0.5-4 Units/hr  0.5-4 mL/hr  Intravenous CONTINUOUS 19                Contrast Orders - past 72 hours (72h ago, onward)    Start     Dose/Rate Route Frequency Ordered Stop    09/09/19 2345  iodixanol (VISIPAQUE 320) injection 150 mL      150 mL INTRA-ARTERIAL ONCE 09/09/19 2340 09/10/19 0317          Interpretation of levels and current regimen:  Trough level is  Subtherapeutic    Has serum creatinine changed > 50% in last 72 hours: No  Urine output:  0.4 ml/kg/hr requiring intermittent diureing  Renal Function: Stable    Plan:  1. Schedule Vancomycin 2750 mg iv q24h  2.  Pharmacy will check trough levels as appropriate in 1-3 Days.    3. Serum creatinine levels will be ordered daily for the first week of therapy and at least twice weekly for subsequent weeks.      Luisa Esquivel, PharmD        .

## 2019-09-13 NOTE — PLAN OF CARE
S:  Pt remains sedated  for pt safety while peripherally cannulated.  Pt wakes to gentle stimulation requiring additional prn doses of sedation/pain meds.  SR. One unit PRBC given for hgb 7.9 on ECMO circuit read out.  Pt OG output more bloody this afternoon.  KCL given x1 for K of 4.  LS coarse with moderate amounts of thick tan/pink tinged/bloody secretions.    B:  Pt s/p saddle PE requiring VA ECMO.  A: Pt hemodynamically stable on Dobutamine at 2.5. Fentanyl, Versed and Precedex for sedation/pain control.  Pt tolerating turns/repositioning but becomes agitated and has continuous coughing which require bolus' of Fentanyl and Versed to resolve.  P:  Pt to go to OR later this afternoon for decannulation.

## 2019-09-13 NOTE — PROGRESS NOTES
ECMO Shift Summary:    Patient remains on VA ECMO, all equipment is functioning and alarms are appropriately set. RPM's 2960 with flow range 3.27-3.42 L/min. Sweep gas is at 0.5 LPM and FiO2 50%. Circuit remains free of air, clot and fibrin. Cannulas are secure with no bleeding from site. Extremities are warm. Suctioned ETT for moderate thick tan/blood tinged secretions.    Significant Shift Events: none    Vent settings:  Ventilation Mode: CMV/AC  (Continuous Mandatory Ventilation/ Assist Control)  FiO2 (%): 50 %  Rate Set (breaths/minute): 12 breaths/min  Tidal Volume Set (mL): 450 mL  PEEP (cm H2O): 6 cmH2O  Oxygen Concentration (%): 50 %  Resp: 20  .    Heparin is running at 2600 u/hr, ACT range 167-178.    Urine output is fair, blood loss was none. Product given included one unit RBC's.      Intake/Output Summary (Last 24 hours) at 2019 1817  Last data filed at 2019 1800  Gross per 24 hour   Intake 5955.25 ml   Output 3880 ml   Net 2075.25 ml       ECHO:  No results found for this or any previous visit.  Results for orders placed in visit on 18   Echocardiogram Complete    Narrative [id number]  [id number]  [id number]  [id number]^CARRINGTON^VINOD^WILLIS           [facility]  Diagnostic and Treamtent-3rd Floor  [address] [place] [address]     Name: XAVIER ROWELL  MRN: [id number]  : 1960  Study Date: 2018 05:59 PM  Age: 58 yrs  Gender: Female  [place] [facility] [place] For Study: Cough, Edema extremities  Ordering Physician: VINOD SHULTZ  Referring Physician: VINOD SHULTZ  Performed By: Ryley Ridley RDCS     BSA: 2.7 m2  Height: 66 in  Weight: 418 lb  HR: 85  BP: 148/77 mmHg  _____________________________________________________________________________  __        Procedure  Echocardiogram with two-dimensional, color and spectral Doppler  performed.  _____________________________________________________________________________  __        Interpretation Summary  Moderate concentric wall thickening consistent with left ventricular  hypertrophy is present.  Biventricular size and systolic function is normal.The LVEF is 55-60%.  No significant valve disease.  Inferior vena cava appers dilated measuring 2.4 cm  _____________________________________________________________________________  __        Left Ventricle  Left ventricular size is normal. Left ventricular function is normal.The EF is  55-60%. Moderate concentric wall thickening consistent with left ventricular  hypertrophy is present. Left ventricular diastolic function is indeterminate.  Regional wall motion is probably normal. Endocardial border definition  suboptimal and contrast not given.     Right Ventricle  The right ventricle is normal size. Global right ventricular function is  normal.     Atria  Both atria appear normal.     Mitral Valve  Mild mitral annular calcification is present. Trace mitral insufficiency is  present.        Aortic Valve  Mild aortic valve sclerosis is present. No aortic regurgitation is present.  Transvalvular Doppler is normal and without signs of signifcant stenosis.     Tricuspid Valve  The peak velocity of the tricuspid regurgitant jet is not obtainable.  Pulmonary artery systolic pressure cannot be assessed.     Pulmonic Valve  The pulmonic valve cannot be assessed.     Vessels  The aorta root is normal. Inferior vena cava appers dilated measuring 2.4 cm.  Ascending aorta 3.3 cm.     Pericardium  No pericardial effusion is present.     _____________________________________________________________________________  __  MMode/2D Measurements & Calculations  IVSd: 1.5 cm  LVIDd: 5.4 cm  LVIDs: 3.7 cm  LVPWd: 1.3 cm  FS: 30.5 %  LV mass(C)d: 323.5 grams  LV mass(C)dI: 118.2 grams/m2     asc Aorta Diam: 3.3 cm  LVOT diam: 2.2 cm  LVOT area: 4.0 cm2  LA Volume (BP):  71.6 ml  LA Volume Index (BP): 26.1 ml/m2  RWT: 0.47        Doppler Measurements & Calculations  MV E max emile: 67.4 cm/sec  MV A max emile: 88.8 cm/sec  MV E/A: 0.76  MV dec time: 0.17 sec  Ao V2 max: 203.2 cm/sec  Ao max P.0 mmHg  MERON(V,D): 2.3 cm2     LV V1 max P.5 mmHg  LV V1 max: 116.8 cm/sec  AV Emile Ratio (DI): 0.57  E/E' av.0  Lateral E/e': 13.4  Medial E/e': 12.6     _____________________________________________________________________________  __           Report approved by: Tariq Pappas 2018 10:50 AM          CXR:  Recent Results (from the past 24 hour(s))   XR Chest Port 1 View   Result Value    Radiologist flags (Urgent)     New opacification of left hemithorax, suspect mucous    Narrative    EXAM: XR CHEST PORT 1 VW  2019 1:40 AM      HISTORY: Check endotracheal tube placement and ECLS cannula placement.  DO NOT log-roll patient.  Place film under patient using patient  safety handling process.    COMPARISON: 2019    FINDINGS: Supine radiograph of the chest. Endotracheal tube tip  projects over the midthoracic trachea. Enteric tubes course beyond the  field-of-view, feeding tube coils once within the stomach body.  Gastric tube sidehole projects with its stomach body. Unchanged small  cylindrical density at the level of the thoracic inlet.    The trachea is midline. The cardiac silhouette is obscured. Increased  now complete opacification of the left hemithorax, with subtle  bronchograms. Patchy right perihilar and basilar opacities are  unchanged. Upper abdomen is unremarkable.       Impression    IMPRESSION:   1. Increased now complete opacification of left hemithorax, suspect  component of mucus plugging with lobar atelectasis. Consider suction  and repeating radiograph. Alternatively, this may represent worsening  of the previously seen left pleural effusion.  2. Unchanged patchy right perihilar and basilar opacities suggesting  pulmonary edema.    [Urgent  Result: New opacification of left hemithorax, suspect mucous  plugging with atelectasis.]    Finding was identified on 9/13/2019 2:56 AM.     Station 4E RN Ramez was contacted by Dr. Oden at 9/13/2019 3:01  AM and verbalized understanding of the urgent finding.    I have personally reviewed the examination and initial interpretation  and I agree with the findings.    LAURA ESCOBEDO MD   XR Chest Port 1 View    Narrative    EXAM: XR CHEST PORT 1 VW  9/13/2019 4:26 AM      HISTORY: intubated, pls repeat prior study with better body  positioning    COMPARISON: 9/13/2019 0127 hours    FINDINGS: Supine AP radiograph of the chest. Endotracheal tube tip  projects over the midthoracic trachea. Enteric tubes course beyond the  field-of-view. Right IJ central line tip projects over the superior  cavoatrial junction. The trachea is midline. The cardiac silhouette is  enlarged. Decreased attenuation of the left hemithorax. Moderate left  pleural effusion appears similar to yesterday's radiograph. Dense left  retrocardiac opacity is unchanged. Unchanged right perihilar and  basilar opacities.       Impression    IMPRESSION:   1. Diffuse attenuation of the left hemithorax seen on 0127 hours  radiograph has resolved. Moderate left pleural effusion and  retrocardiac atelectasis/consolidation is similar to yesterday's  radiograph.  2. Unchanged right perihilar and basilar opacities suggesting  pulmonary edema.    I have personally reviewed the examination and initial interpretation  and I agree with the findings.    LAURA ESCOBEDO MD       Labs:  Recent Labs   Lab 09/13/19  1715 09/13/19  1542 09/13/19  1347 09/13/19  1243   PH 7.43 7.44 7.43 7.44   PCO2 43 42 44 44   PO2 84 82 81 84   HCO3 28 29* 29* 29*   O2PER 50.0 50.0 50 50       Lab Results   Component Value Date    HGB 8.6 (L) 09/13/2019    PHGB <30 09/13/2019    PLT 91 (L) 09/13/2019    FIBR 684 (H) 09/13/2019    INR 1.25 (H) 09/13/2019    PTT 84 (H) 09/13/2019    DD  5.0 (H) 09/13/2019    AXA 0.46 09/13/2019    ANTCH 62 (L) 09/13/2019         Plan is to continue ECMO support overnight, decannulate at 7am tomorrow.      Deja Ash, RT, RRT  9/13/2019 6:17 PM

## 2019-09-13 NOTE — PROGRESS NOTES
Cardiology Progress Note    Assessment & Plan      59 year old female with a history of hypertension, hyperlipidemia, sleep apnea, morbid obesity, and chronic bronchitis who presents to the emergency department via EMS for evaluation of respiratory distress s/p intubation and PEA arrest with CTA consistent with bilateral PE transferred here for [facility] ECMO thrombectomy and catheter directed lytics on 9/9.    Plan for today  - decannulation  - minimize versed  - gentle diuresis    Neurology: Intubated, sedated  --continue versed, fentanyl propofol in favor of fentanyl   - initial CTH no acute changes, moving when sedation is weaned down    Cardiovascular / Hemodynamics: PEA arrest secondary to massive PE.   CTA-PE study demonstrates bilateral distal main and proximal lobar pulmonary emboli, greater on the right with elevated RV/LV ratio. Peripheral V-A ECMO inserted for hemodynamic support for massive PE. Went to cath lab and then to IR for suction mechanical thrombectomy.  LA 8.6   TTE: dilated RV, severely reduced.   EKG: snus tachy  --ACT goal 180-200  --hold lipitor for now given likely hepatic injury during arrest  --hold ACE/ARB for now given likely reduced renal fxn after arrest  --holding beta blocker given shock   - on dobutamine at 0.5   Pulmonary: PEA arrest secondary to massive PE. See above.   Vent Settings:  ETT in appropriate place.  Now weaning vent requirements.  CXR: Lines in stable position.   --wean vent as able  --daily CXR  - s/p thrombectomy and catheter directed lytics 9/9-9/10  Chest CTSubpleural groundglass attenuation in the right lung, possibly  pulmonary infarcts given previously seen extensive pulmonary emboli.   Patchy nodularity in the right base suggesting aspiration.  --- scheduled duonebs    GI and Nutrition: No known medical hx.    --monitor BID LFTs  - nutrition consult   --GI Prophylaxis: PPI    Renal, Fluid and Electrolytes: Cr at baseline 1.1 UOP continue to monitor.  --monitor  urine output  --maintain K>3 and Mg>2    Infectious Disease: No signs of infection. Leukocytosis c/w arrest. Blood cultures collected.   --vancomycin/zosyn x5 days for ECMO  --daily blood cultures  --monitor for signs of infection given cooling, lines, and leukocytosis  - sputum culture staph aureus sensitive to zosyn    Hematology and Oncology: Receiving heparin for ECMO and  PE.   --cryo PRN fibrinogen < 200; FFP for INR >2  --Transfuse for Hgb<10  --heparin gtt for ECMO with ACT goal 160-180  --US LE w/ arterial duplex per ECMO protocol   --DVT PPX: Heparin as above  - course complicated by DIC with fibrinogen of 82 on 9/10 got 5 unit(s) of cryo   Endocrinology: No known medical history. BG elevated.  --insulin gtt   Lines: R femoral arterial and venous ECMO cannulae September 9, 2019  L femoral arterial line September 9, 2019  R radial arterial line September 9, 2019  ETT September 9, 2019  Underwood catheter September 9, 2019  OG tube September 9, 2019  Restraint: needed    Current lines are required for patient management       Family update by me today: Yes      Code Status:     The pt was discussed and evaluated with Dr. Mendez, Cardiologist, attending physician, who agrees with the assessment and plan above.     Elvira Chilel    Interval History   Placed on dobutamine yesterday weaned off norepi  coags within parameters no transfusions in the last 24 hrs    Ventilation Mode: CMV/AC  (Continuous Mandatory Ventilation/ Assist Control)  FiO2 (%): 50 %  Rate Set (breaths/minute): 12 breaths/min  Tidal Volume Set (mL): 450 mL  PEEP (cm H2O): 6 cmH2O  Oxygen Concentration (%): 50 %  Resp: 13        Physical Exam   Temp: 97  F (36.1  C) Temp src: Esophageal     Heart Rate: 78 Resp: 13 SpO2: 98 % O2 Device: Mechanical Ventilator    Vitals:    09/11/19 0530 09/12/19 0400 09/13/19 0400   Weight: (!) 180 kg (396 lb 13.3 oz) (!) 177 kg (390 lb 3.4 oz) (!) 184 kg (405 lb 10.3 oz)     Vital Signs with  "Ranges  Temp:  [93.6  F (34.2  C)-99.5  F (37.5  C)] 97  F (36.1  C)  Heart Rate:  [65-87] 78  Resp:  [12-14] 13  MAP:  [53 mmHg-90 mmHg] 77 mmHg  Arterial Line BP: ()/(42-72) 131/60  FiO2 (%):  [40 %-50 %] 50 %  SpO2:  [96 %-100 %] 98 %  I/O last 3 completed shifts:  In: 6374.46 [I.V.:2659.46; NG/GT:1255]  Out: 5515 [Urine:3415; Emesis/NG output:2100]    Heart Rate: 78, Pulse 86, temperature 97  F (36.1  C), resp. rate 13, height 1.676 m (5' 5.98\"), weight (!) 184 kg (405 lb 10.3 oz), SpO2 98 %, not currently breastfeeding.  405 lbs 10.34 oz  GEN:  Morbidly obese sedated and intubated  CV:  Distant heart sounds   LUNGS:  Decreased breath sounds  ABD:  Active bowel sounds, soft, non-tender/non-distended.  No rebound/guarding/rigidity.  EXT:  No edema or cyanosis.  ECMO canula in place with only scant oozing   Underwood in place    Medications     dexmedetomidine 0.7 mcg/kg/hr (09/13/19 0700)     IV fluid REPLACEMENT ONLY       IV fluid REPLACEMENT ONLY       DOBUTamine 2.5 mcg/kg/min (09/13/19 0700)     fentaNYL 75 mcg/hr (09/13/19 0700)     heparin 2 unit/mL in 0.9% NaCl 3 mL/hr (09/11/19 1800)     HEParin 2,600 Units/hr (09/13/19 0700)     insulin (regular) 1 Units/hr (09/13/19 0700)     midazolam 4 mg/hr (09/13/19 0700)     - MEDICATION INSTRUCTIONS -       norepinephrine Stopped (09/12/19 1400)     - MEDICATION INSTRUCTIONS -       propofol (DIPRIVAN) infusion Stopped (09/11/19 0940)     sodium chloride Stopped (09/10/19 2005)     vasopressin (PITRESSIN) infusion ADULT (40 mL) Stopped (09/11/19 1700)       artificial tears   Both Eyes Q8H     ipratropium - albuterol 0.5 mg/2.5 mg/3 mL  3 mL Nebulization 4x daily     multivitamins w/minerals  15 mL Per Feeding Tube Daily     pantoprazole  40 mg Oral or Feeding Tube Daily     piperacillin-tazobactam  3.375 g Intravenous Q6H     senna-docusate  1 tablet Oral or Feeding Tube At Bedtime     sodium chloride (PF)  3 mL Intracatheter Q8H     vancomycin (VANCOCIN) IV  " 2,750 mg (central catheter) Intravenous Q24H     vitamin B1  100 mg Oral or Feeding Tube Daily       Data   Recent Labs   Lab 09/13/19  0359 09/12/19  2159 09/12/19  1521 09/12/19  1519   WBC 13.7* 14.6*  --  15.9*   HGB 8.6* 8.7*  --  8.4*   MCV 95 94  --  94   PLT 94* 94*  --  88*   INR 1.26* 1.27*  --  1.28*   * 148*  --  148*   POTASSIUM 3.9 4.0  --  4.1   CHLORIDE 113* 113*  --  113*   CO2 28 29  --  28   BUN 46* 43*  --  44*   CR 1.13* 1.14*  --  1.26*   ANIONGAP 5 6  --  7   MARIA INES 8.2* 8.1*  --  8.3*   *  125* 109*  111* 111* 108*   ALBUMIN 2.1* 2.1*  --  2.0*   PROTTOTAL 5.2* 5.2*  --  5.3*   BILITOTAL 0.6 0.5  --  0.5   ALKPHOS 49 50  --  48   ALT 51* 59*  --  64*   AST 18 27  --  29   TROPI 0.025 0.043  --  0.051*       Recent Results (from the past 24 hour(s))   XR Chest Port 1 View    Narrative    EXAM: XR CHEST PORT 1 VW  9/13/2019 1:40 AM      HISTORY: Check endotracheal tube placement and ECLS cannula placement.  DO NOT log-roll patient.  Place film under patient using patient  safety handling process.    COMPARISON: 9/12/2019    FINDINGS: Supine radiograph of the chest. Endotracheal tube tip  projects over the midthoracic trachea. Enteric tubes course beyond the  field-of-view, feeding tube coils once within the stomach body.  Gastric tube sidehole projects with its stomach body. Unchanged small  cylindrical density at the level of the thoracic inlet.    The trachea is midline. The cardiac silhouette is obscured. Increased  now complete opacification of the left hemithorax, with subtle  bronchograms. Patchy right perihilar and basilar opacities are  unchanged. Upper abdomen is unremarkable.       Impression    IMPRESSION:   1. Increased now complete opacification of left hemithorax, suspect  component of mucus plugging with lobar atelectasis. Consider suction  and repeating radiograph. Alternatively, this may represent worsening  of the previously seen left pleural effusion.  2.  Unchanged patchy right perihilar and basilar opacities suggesting  pulmonary edema.     XR Chest Port 1 View    Narrative    EXAM: XR CHEST PORT 1 VW  9/13/2019 4:26 AM      HISTORY: intubated, pls repeat prior study with better body  positioning    COMPARISON: 9/13/2019 0127 hours    FINDINGS: Supine AP radiograph of the chest. Endotracheal tube tip  projects over the midthoracic trachea. Enteric tubes course beyond the  field-of-view. Right IJ central line tip projects over the superior  cavoatrial junction. The trachea is midline. The cardiac silhouette is  enlarged. Decreased attenuation of the left hemithorax. Moderate left  pleural effusion appears similar to yesterday's radiograph. Dense left  retrocardiac opacity is unchanged. Unchanged right perihilar and  basilar opacities.       Impression    IMPRESSION:   1. Diffuse attenuation of the left hemithorax seen on 0127 hours  radiograph has resolved. Moderate left pleural effusion and  retrocardiac atelectasis/consolidation is similar to yesterday's  radiograph.  2. Unchanged right perihilar and basilar opacities suggesting  pulmonary edema.     I have seen and examined the patient with the [facility] team. I agree with the assessment and plan of the note above.I have reviewed pertinent labs.     Raffaele Stuart MD  Interventional Cardiology  Pager: [phone]

## 2019-09-13 NOTE — PROGRESS NOTES
Cardiology Critical Care Note - Cardiology  Raffaele Stuart M.D.  The patient remains unstable in the ICU with on-going need for ventilator support, parenteral medications for the adjustment of blood pressure and cardiac output and maintenance of renal function.      The patient is seen for prolonged ventilator management requiring oxygen and/or pressure modulation; shock requiring vasopressor and or inotropic agents; low cardiac output necessitating inotropic agents, vasopressors and afterload reducing agents; VA ECMO;  RIGHT ventricular failure Pneumonia with staph requiring antibiotic selection and monitoring, v  I personally reviewed:  Arterial and venous blood gases to assess acid base balance, oxygenation, and ventilator settings.    .  Ventilatory settings and/or supplement oxygen needs in order to obtain optimal oxygenation and electrolyte balance at low possible pressure support and inspired oxygen tension      ECMO Attending Progress Note  9/13/2019    59 year old female with a history of hypertension, hyperlipidemia, sleep apnea, morbid obesity, and chronic bronchitis who presents to the emergency department via EMS for evaluation of respiratory distress s/p intubation and PEA arrest with CTA consistent with bilateral PE transferred here for VA ECMO thrombectomy and catheter directed lytics on 9/9.  Interval events: improving RV function still critically ill on low dose pressors    Physical Exam:  Temp:  [90.7  F (32.6  C)-99.5  F (37.5  C)] 90.7  F (32.6  C)  Heart Rate:  [68-87] 80  Resp:  [12-14] 14  MAP:  [53 mmHg-108 mmHg] 81 mmHg  Arterial Line BP: ()/(42-69) 144/60  FiO2 (%):  [40 %-50 %] 50 %  SpO2:  [96 %-100 %] 97 %    Intake/Output Summary (Last 24 hours) at 9/13/2019 1010  Last data filed at 9/12/2019 1000  Gross per 24 hour   Intake 5757.87 ml   Output 5355 ml   Net 402.87 ml    Ventilation Mode: CMV/AC  (Continuous Mandatory Ventilation/ Assist Control)  FiO2 (%): 50 %  Rate Set  (breaths/minute): 12 breaths/min  Tidal Volume Set (mL): 450 mL  PEEP (cm H2O): 6 cmH2O  Oxygen Concentration (%): 50 %  Resp: 14     General: no acute distress, intubated and sedated  HEENT: PERRLA, conjunctiva clear, without icterus or pallor, oropharyx clear  Cardiac: RRR nl S1S2   Lungs: clear to auscultation and percussion bilaterally anterior  Abdomen: soft, nontender, non distended, no hepatosplenomegaly or masses  Extremities: without edema or cyanosis; pulses are palpable;   Skin: normal skin appearance without worrisome lesions.   Neurologic:intubated and sedated,     Labs:  Recent Labs   Lab 09/13/19  0821 09/13/19  0558 09/13/19  0359 09/13/19  0201   PH 7.43 7.44 7.44 7.44   PCO2 43 43 43 43   PO2 84 83 102 102   HCO3 29* 29* 29* 29*   O2PER 50 50 50 50      Recent Labs   Lab 09/13/19  0359 09/12/19  2159 09/12/19  1519 09/12/19  0941   WBC 13.7* 14.6* 15.9* 17.5*   HGB 8.6* 8.7* 8.4* 9.0*     Creatinine   Date Value Ref Range Status   09/13/2019 1.13 (H) 0.52 - 1.04 mg/dL Final   09/12/2019 1.14 (H) 0.52 - 1.04 mg/dL Final   09/12/2019 1.26 (H) 0.52 - 1.04 mg/dL Final   09/12/2019 1.25 (H) 0.52 - 1.04 mg/dL Final       ECMO Issues including assessments and plan on DOS 9/13/2019:  Neuro: Sedated for mechanical ventilation and ECMO.  NIRS stable  b/l  RASS goal: 0-1  CV: Cardiogenic shock.  Hemodynamically stable on norepi  Pulm: Flows unchanged at 4, Sweep unchanged at 3, Keep vent settings at rest settings:as above  FEN/Renal: Electrolytes stable w/ replacement protocols in place, Cr stable, UOP stable  Heme: ACT goal: 200, Hemoglobin stable .  Goals: if O2 sat >85% Hgb >8.  If O2 Sat <85% keep Hgb 10-12.  Minimal oozing around the ECMO cannulas.  ID: Receiving empiric antibiotics  Cannulae: Position is acceptable on exam and the available imaging.  Distal perfusion cannula is in place and patent.     I have personally reviewed the ECMO flows, oxygenation and CO2 clearance, anticoagulation, and cannula  position.  I have also personally assessed the patient's systemic response with hemodynamics, oxygenation, ventilation, and bleeding.         September 13, 2019

## 2019-09-13 NOTE — PROGRESS NOTES
ECMO Shift Summary:    Patient remains on VA ECMO, all equipment is functioning and alarms are appropriately set. RPM's 2960 with flow range 3.14- 3.22 L/min. Sweep gas is at 2.5 LPM and FiO2 50%. Circuit remains free of air, clot and fibrin. Cannulas are secure with some oozing from site. Extremities are warm and equally perfused. Suctioned ETT for large amount of thick cloudy/blood tinged secretions.    Significant Shift Events:   No Significant Events this shift.     Vent settings:  Ventilation Mode: CMV/AC  (Continuous Mandatory Ventilation/ Assist Control)  FiO2 (%): 50 %  Rate Set (breaths/minute): 12 breaths/min  Tidal Volume Set (mL): 450 mL  PEEP (cm H2O): 6 cmH2O  Oxygen Concentration (%): 50 %  Resp: 12  .    Heparin is running at 2600 u/hr, ACT range 183-187 within goal.    Urine output is as charted. No blood loss. No product given.      Intake/Output Summary (Last 24 hours) at 2019 0607  Last data filed at 2019 0500  Gross per 24 hour   Intake 6323.26 ml   Output 5150 ml   Net 1173.26 ml       ECHO:  No results found for this or any previous visit.  Results for orders placed in visit on 18   Echocardiogram Complete    Narrative [id number]  [id number]  [id number]  [id number]^CARRINGTON^VINOD^WILLIS           [facility]  Diagnostic and Treamtent-3rd Floor  [address] [place] [id number]     Name: XAVIER ROWELL  MRN: [id number]  : 1960  Study Date: 2018 05:59 PM  Age: 58 yrs  Gender: Female  [place] [facility] [place]  Reason For Study: Cough, Edema extremities  Ordering Physician: VINOD SHULTZ  Referring Physician: VINOD SHULTZ  Performed By: Ryley Ridley RDCS     BSA: 2.7 m2  Height: 66 in  Weight: 418 lb  HR: 85  BP: 148/77 mmHg  _____________________________________________________________________________  __        Procedure  Echocardiogram with two-dimensional, color and spectral Doppler  performed.  _____________________________________________________________________________  __        Interpretation Summary  Moderate concentric wall thickening consistent with left ventricular  hypertrophy is present.  Biventricular size and systolic function is normal.The LVEF is 55-60%.  No significant valve disease.  Inferior vena cava appers dilated measuring 2.4 cm  _____________________________________________________________________________  __        Left Ventricle  Left ventricular size is normal. Left ventricular function is normal.The EF is  55-60%. Moderate concentric wall thickening consistent with left ventricular  hypertrophy is present. Left ventricular diastolic function is indeterminate.  Regional wall motion is probably normal. Endocardial border definition  suboptimal and contrast not given.     Right Ventricle  The right ventricle is normal size. Global right ventricular function is  normal.     Atria  Both atria appear normal.     Mitral Valve  Mild mitral annular calcification is present. Trace mitral insufficiency is  present.        Aortic Valve  Mild aortic valve sclerosis is present. No aortic regurgitation is present.  Transvalvular Doppler is normal and without signs of signifcant stenosis.     Tricuspid Valve  The peak velocity of the tricuspid regurgitant jet is not obtainable.  Pulmonary artery systolic pressure cannot be assessed.     Pulmonic Valve  The pulmonic valve cannot be assessed.     Vessels  The aorta root is normal. Inferior vena cava appers dilated measuring 2.4 cm.  Ascending aorta 3.3 cm.     Pericardium  No pericardial effusion is present.     _____________________________________________________________________________  __  MMode/2D Measurements & Calculations  IVSd: 1.5 cm  LVIDd: 5.4 cm  LVIDs: 3.7 cm  LVPWd: 1.3 cm  FS: 30.5 %  LV mass(C)d: 323.5 grams  LV mass(C)dI: 118.2 grams/m2     asc Aorta Diam: 3.3 cm  LVOT diam: 2.2 cm  LVOT area: 4.0 cm2  LA Volume (BP):  71.6 ml  LA Volume Index (BP): 26.1 ml/m2  RWT: 0.47        Doppler Measurements & Calculations  MV E max emile: 67.4 cm/sec  MV A max emile: 88.8 cm/sec  MV E/A: 0.76  MV dec time: 0.17 sec  Ao V2 max: 203.2 cm/sec  Ao max P.0 mmHg  MERON(V,D): 2.3 cm2     LV V1 max P.5 mmHg  LV V1 max: 116.8 cm/sec  AV Emile Ratio (DI): 0.57  E/E' av.0  Lateral E/e': 13.4  Medial E/e': 12.6     _____________________________________________________________________________  __           Report approved by: Tariq Pappas 2018 10:50 AM          CXR:  Recent Results (from the past 24 hour(s))   XR Chest Port 1 View    Narrative    EXAM: XR CHEST PORT 1 VW  2019 1:40 AM      HISTORY: Check endotracheal tube placement and ECLS cannula placement.  DO NOT log-roll patient.  Place film under patient using patient  safety handling process.    COMPARISON: 2019    FINDINGS: Supine radiograph of the chest. Endotracheal tube tip  projects over the midthoracic trachea. Enteric tubes course beyond the  field-of-view, feeding tube coils once within the stomach body.  Gastric tube sidehole projects with its stomach body. Unchanged small  cylindrical density at the level of the thoracic inlet.    The trachea is midline. The cardiac silhouette is obscured. Increased  now complete opacification of the left hemithorax, with subtle  bronchograms. Patchy right perihilar and basilar opacities are  unchanged. Upper abdomen is unremarkable.       Impression    IMPRESSION:   1. Increased now complete opacification of left hemithorax, suspect  component of mucus plugging with lobar atelectasis. Consider suction  and repeating radiograph. Alternatively, this may represent worsening  of the previously seen left pleural effusion.  2. Unchanged patchy right perihilar and basilar opacities suggesting  pulmonary edema.     XR Chest Port 1 View    Narrative    EXAM: XR CHEST PORT 1 VW  2019 4:26 AM      HISTORY: intubated, pls  repeat prior study with better body  positioning    COMPARISON: 9/13/2019 0127 hours    FINDINGS: Supine AP radiograph of the chest. Endotracheal tube tip  projects over the midthoracic trachea. Enteric tubes course beyond the  field-of-view. Right IJ central line tip projects over the superior  cavoatrial junction. The trachea is midline. The cardiac silhouette is  enlarged. Decreased attenuation of the left hemithorax. Moderate left  pleural effusion appears similar to yesterday's radiograph. Dense left  retrocardiac opacity is unchanged. Unchanged right perihilar and  basilar opacities.       Impression    IMPRESSION:   1. Diffuse attenuation of the left hemithorax seen on 0127 hours  radiograph has resolved. Moderate left pleural effusion and  retrocardiac atelectasis/consolidation is similar to yesterday's  radiograph.  2. Unchanged right perihilar and basilar opacities suggesting  pulmonary edema.       Labs:  Recent Labs   Lab 09/13/19  0558 09/13/19  0359 09/13/19  0201 09/13/19  0006   PH 7.44 7.44 7.44 7.45   PCO2 43 43 43 41   PO2 83 102 102 96   HCO3 29* 29* 29* 29*   O2PER 50 50 50 50       Lab Results   Component Value Date    HGB 8.6 (L) 09/13/2019    PHGB <30 09/13/2019    PLT 94 (L) 09/13/2019    FIBR 666 (H) 09/13/2019    INR 1.26 (H) 09/13/2019    PTT 97 (H) 09/13/2019    DD 5.5 (H) 09/13/2019    AXA 0.62 09/13/2019    ANTCH 62 (L) 09/12/2019         Plan is to remain stable on VA ECMO,  Potentially to OR today for Turndown and decannulation.      Emma Land, RT, RRT  9/13/2019 6:07 AM

## 2019-09-13 NOTE — PLAN OF CARE
59 y.o. F admitted with B. PE currently on VA ECMO  Neuro: Unchanged.   CV: VA ECMO continued, see ECMO progress note. No products given overnight.   Resp: Vent setting continued. ABGs WDL. Repeat AM CXR completed. LS coarse, minimal ETT secretions, sami thick.   GI: hypoactive BS. No BM overnight.   : UOP low end of adequate.  Plan: OR add-on for decannulation today. Address concerns with CSI.

## 2019-09-14 ENCOUNTER — APPOINTMENT (OUTPATIENT)
Dept: GENERAL RADIOLOGY | Facility: CLINIC | Age: 59
DRG: 003 | End: 2019-09-14
Payer: COMMERCIAL

## 2019-09-14 ENCOUNTER — APPOINTMENT (OUTPATIENT)
Dept: GENERAL RADIOLOGY | Facility: CLINIC | Age: 59
DRG: 003 | End: 2019-09-14
Attending: INTERNAL MEDICINE
Payer: COMMERCIAL

## 2019-09-14 ENCOUNTER — APPOINTMENT (OUTPATIENT)
Dept: CT IMAGING | Facility: CLINIC | Age: 59
DRG: 003 | End: 2019-09-14
Attending: STUDENT IN AN ORGANIZED HEALTH CARE EDUCATION/TRAINING PROGRAM
Payer: COMMERCIAL

## 2019-09-14 LAB
ALBUMIN SERPL-MCNC: 2 G/DL (ref 3.4–5)
ALBUMIN SERPL-MCNC: 2.1 G/DL (ref 3.4–5)
ALBUMIN SERPL-MCNC: 2.2 G/DL (ref 3.4–5)
ALBUMIN SERPL-MCNC: 2.4 G/DL (ref 3.4–5)
ALP SERPL-CCNC: 76 U/L (ref 40–150)
ALP SERPL-CCNC: 77 U/L (ref 40–150)
ALP SERPL-CCNC: 78 U/L (ref 40–150)
ALP SERPL-CCNC: 80 U/L (ref 40–150)
ALT SERPL W P-5'-P-CCNC: 33 U/L (ref 0–50)
ALT SERPL W P-5'-P-CCNC: 35 U/L (ref 0–50)
ALT SERPL W P-5'-P-CCNC: 37 U/L (ref 0–50)
ALT SERPL W P-5'-P-CCNC: 38 U/L (ref 0–50)
ANION GAP SERPL CALCULATED.3IONS-SCNC: 5 MMOL/L (ref 3–14)
ANION GAP SERPL CALCULATED.3IONS-SCNC: 7 MMOL/L (ref 3–14)
ANION GAP SERPL CALCULATED.3IONS-SCNC: 8 MMOL/L (ref 3–14)
ANION GAP SERPL CALCULATED.3IONS-SCNC: 8 MMOL/L (ref 3–14)
APTT PPP: 128 SEC (ref 22–37)
APTT PPP: 75 SEC (ref 22–37)
APTT PPP: 81 SEC (ref 22–37)
APTT PPP: 84 SEC (ref 22–37)
AST SERPL W P-5'-P-CCNC: 14 U/L (ref 0–45)
AST SERPL W P-5'-P-CCNC: 18 U/L (ref 0–45)
AT III ACT/NOR PPP CHRO: 57 % (ref 85–135)
BASE EXCESS BLDA CALC-SCNC: 1.1 MMOL/L
BASE EXCESS BLDA CALC-SCNC: 1.3 MMOL/L
BASE EXCESS BLDA CALC-SCNC: 1.7 MMOL/L
BASE EXCESS BLDA CALC-SCNC: 1.9 MMOL/L
BASE EXCESS BLDA CALC-SCNC: 1.9 MMOL/L
BASE EXCESS BLDA CALC-SCNC: 2.6 MMOL/L
BASE EXCESS BLDA CALC-SCNC: 2.7 MMOL/L
BASE EXCESS BLDA CALC-SCNC: 3.1 MMOL/L
BASE EXCESS BLDA CALC-SCNC: 3.1 MMOL/L
BASE EXCESS BLDA CALC-SCNC: 3.2 MMOL/L
BASE EXCESS BLDA CALC-SCNC: 3.4 MMOL/L
BASE EXCESS BLDA CALC-SCNC: 3.6 MMOL/L
BASE EXCESS BLDV CALC-SCNC: 1.2 MMOL/L
BASE EXCESS BLDV CALC-SCNC: 2.7 MMOL/L
BASE EXCESS BLDV CALC-SCNC: 3.2 MMOL/L
BILIRUB SERPL-MCNC: 0.5 MG/DL (ref 0.2–1.3)
BILIRUB SERPL-MCNC: 0.6 MG/DL (ref 0.2–1.3)
BILIRUB SERPL-MCNC: 0.8 MG/DL (ref 0.2–1.3)
BILIRUB SERPL-MCNC: 1.1 MG/DL (ref 0.2–1.3)
BLD PROD TYP BPU: NORMAL
BLD UNIT ID BPU: 0
BLOOD PRODUCT CODE: NORMAL
BPU ID: NORMAL
BUN SERPL-MCNC: 32 MG/DL (ref 7–30)
BUN SERPL-MCNC: 35 MG/DL (ref 7–30)
BUN SERPL-MCNC: 35 MG/DL (ref 7–30)
BUN SERPL-MCNC: 39 MG/DL (ref 7–30)
CA-I BLD-MCNC: 4.8 MG/DL (ref 4.4–5.2)
CALCIUM SERPL-MCNC: 8.2 MG/DL (ref 8.5–10.1)
CALCIUM SERPL-MCNC: 8.3 MG/DL (ref 8.5–10.1)
CALCIUM SERPL-MCNC: 8.3 MG/DL (ref 8.5–10.1)
CALCIUM SERPL-MCNC: 8.4 MG/DL (ref 8.5–10.1)
CHLORIDE SERPL-SCNC: 110 MMOL/L (ref 94–109)
CHLORIDE SERPL-SCNC: 110 MMOL/L (ref 94–109)
CHLORIDE SERPL-SCNC: 113 MMOL/L (ref 94–109)
CHLORIDE SERPL-SCNC: 113 MMOL/L (ref 94–109)
CO2 SERPL-SCNC: 26 MMOL/L (ref 20–32)
CO2 SERPL-SCNC: 27 MMOL/L (ref 20–32)
CREAT SERPL-MCNC: 0.85 MG/DL (ref 0.52–1.04)
CREAT SERPL-MCNC: 0.88 MG/DL (ref 0.52–1.04)
CREAT SERPL-MCNC: 0.89 MG/DL (ref 0.52–1.04)
CREAT SERPL-MCNC: 0.98 MG/DL (ref 0.52–1.04)
CRP SERPL-MCNC: 100 MG/L (ref 0–8)
D DIMER PPP FEU-MCNC: 13.1 UG/ML FEU (ref 0–0.5)
D DIMER PPP FEU-MCNC: 7.3 UG/ML FEU (ref 0–0.5)
ERYTHROCYTE [DISTWIDTH] IN BLOOD BY AUTOMATED COUNT: 16.4 % (ref 10–15)
ERYTHROCYTE [DISTWIDTH] IN BLOOD BY AUTOMATED COUNT: 16.6 % (ref 10–15)
ERYTHROCYTE [DISTWIDTH] IN BLOOD BY AUTOMATED COUNT: 16.6 % (ref 10–15)
ERYTHROCYTE [DISTWIDTH] IN BLOOD BY AUTOMATED COUNT: 16.9 % (ref 10–15)
ERYTHROCYTE [SEDIMENTATION RATE] IN BLOOD BY WESTERGREN METHOD: 85 MM/H (ref 0–30)
FIBRINOGEN PPP-MCNC: 744 MG/DL (ref 200–420)
FIBRINOGEN PPP-MCNC: 755 MG/DL (ref 200–420)
GFR SERPL CREATININE-BSD FRML MDRD: 64 ML/MIN/{1.73_M2}
GFR SERPL CREATININE-BSD FRML MDRD: 71 ML/MIN/{1.73_M2}
GFR SERPL CREATININE-BSD FRML MDRD: 72 ML/MIN/{1.73_M2}
GFR SERPL CREATININE-BSD FRML MDRD: 75 ML/MIN/{1.73_M2}
GLUCOSE BLD-MCNC: 108 MG/DL (ref 70–99)
GLUCOSE BLD-MCNC: 110 MG/DL (ref 70–99)
GLUCOSE BLD-MCNC: 114 MG/DL (ref 70–99)
GLUCOSE BLD-MCNC: 117 MG/DL (ref 70–99)
GLUCOSE BLDC GLUCOMTR-MCNC: 104 MG/DL (ref 70–99)
GLUCOSE BLDC GLUCOMTR-MCNC: 106 MG/DL (ref 70–99)
GLUCOSE BLDC GLUCOMTR-MCNC: 108 MG/DL (ref 70–99)
GLUCOSE BLDC GLUCOMTR-MCNC: 109 MG/DL (ref 70–99)
GLUCOSE BLDC GLUCOMTR-MCNC: 110 MG/DL (ref 70–99)
GLUCOSE BLDC GLUCOMTR-MCNC: 114 MG/DL (ref 70–99)
GLUCOSE BLDC GLUCOMTR-MCNC: 114 MG/DL (ref 70–99)
GLUCOSE BLDC GLUCOMTR-MCNC: 116 MG/DL (ref 70–99)
GLUCOSE BLDC GLUCOMTR-MCNC: 116 MG/DL (ref 70–99)
GLUCOSE BLDC GLUCOMTR-MCNC: 119 MG/DL (ref 70–99)
GLUCOSE BLDC GLUCOMTR-MCNC: 125 MG/DL (ref 70–99)
GLUCOSE BLDC GLUCOMTR-MCNC: 92 MG/DL (ref 70–99)
GLUCOSE BLDC GLUCOMTR-MCNC: 97 MG/DL (ref 70–99)
GLUCOSE SERPL-MCNC: 108 MG/DL (ref 70–99)
GLUCOSE SERPL-MCNC: 110 MG/DL (ref 70–99)
GLUCOSE SERPL-MCNC: 113 MG/DL (ref 70–99)
GLUCOSE SERPL-MCNC: 115 MG/DL (ref 70–99)
GRAM STN SPEC: NORMAL
GRAM STN SPEC: NORMAL
HCO3 BLD-SCNC: 26 MMOL/L (ref 21–28)
HCO3 BLD-SCNC: 27 MMOL/L (ref 21–28)
HCO3 BLD-SCNC: 28 MMOL/L (ref 21–28)
HCO3 BLDA-SCNC: 27 MMOL/L (ref 21–28)
HCO3 BLDA-SCNC: 27 MMOL/L (ref 21–28)
HCO3 BLDA-SCNC: 28 MMOL/L (ref 21–28)
HCO3 BLDV-SCNC: 27 MMOL/L (ref 21–28)
HCO3 BLDV-SCNC: 28 MMOL/L (ref 21–28)
HCO3 BLDV-SCNC: 29 MMOL/L (ref 21–28)
HCT VFR BLD AUTO: 26.3 % (ref 35–47)
HCT VFR BLD AUTO: 26.8 % (ref 35–47)
HCT VFR BLD AUTO: 26.9 % (ref 35–47)
HCT VFR BLD AUTO: 27.9 % (ref 35–47)
HGB BLD-MCNC: 8.1 G/DL (ref 11.7–15.7)
HGB BLD-MCNC: 8.1 G/DL (ref 11.7–15.7)
HGB BLD-MCNC: 8.4 G/DL (ref 11.7–15.7)
HGB BLD-MCNC: 8.5 G/DL (ref 11.7–15.7)
HGB FREE PLAS-MCNC: <30 MG/DL
INR PPP: 1.2 (ref 0.86–1.14)
INR PPP: 1.2 (ref 0.86–1.14)
INR PPP: 1.21 (ref 0.86–1.14)
INR PPP: 1.22 (ref 0.86–1.14)
KCT BLD-ACNC: 163 SEC (ref 75–150)
KCT BLD-ACNC: 166 SEC (ref 75–150)
KCT BLD-ACNC: 167 SEC (ref 75–150)
KCT BLD-ACNC: 171 SEC (ref 75–150)
KCT BLD-ACNC: 187 SEC (ref 75–150)
LACTATE BLD-SCNC: 0.4 MMOL/L (ref 0.7–2)
LACTATE BLD-SCNC: 0.5 MMOL/L (ref 0.7–2)
LACTATE BLD-SCNC: 0.5 MMOL/L (ref 0.7–2)
LDH SERPL L TO P-CCNC: 421 U/L (ref 81–234)
LMWH PPP CHRO-ACNC: 0.34 IU/ML
LMWH PPP CHRO-ACNC: 0.38 IU/ML
LMWH PPP CHRO-ACNC: 0.39 IU/ML
LMWH PPP CHRO-ACNC: 0.48 IU/ML
MAGNESIUM SERPL-MCNC: 2.1 MG/DL (ref 1.6–2.3)
MAGNESIUM SERPL-MCNC: 2.2 MG/DL (ref 1.6–2.3)
MCH RBC QN AUTO: 28.8 PG (ref 26.5–33)
MCH RBC QN AUTO: 28.9 PG (ref 26.5–33)
MCH RBC QN AUTO: 29.1 PG (ref 26.5–33)
MCH RBC QN AUTO: 29.9 PG (ref 26.5–33)
MCHC RBC AUTO-ENTMCNC: 30.1 G/DL (ref 31.5–36.5)
MCHC RBC AUTO-ENTMCNC: 30.5 G/DL (ref 31.5–36.5)
MCHC RBC AUTO-ENTMCNC: 30.8 G/DL (ref 31.5–36.5)
MCHC RBC AUTO-ENTMCNC: 31.3 G/DL (ref 31.5–36.5)
MCV RBC AUTO: 95 FL (ref 78–100)
MCV RBC AUTO: 96 FL (ref 78–100)
NUM BPU REQUESTED: 1
O2/TOTAL GAS SETTING VFR VENT: 40 %
O2/TOTAL GAS SETTING VFR VENT: 50 %
O2/TOTAL GAS SETTING VFR VENT: 60 %
OXYHGB MFR BLD: 90 % (ref 92–100)
OXYHGB MFR BLD: 91 % (ref 92–100)
OXYHGB MFR BLD: 91 % (ref 92–100)
OXYHGB MFR BLD: 94 % (ref 92–100)
OXYHGB MFR BLD: 94 % (ref 92–100)
OXYHGB MFR BLD: 95 % (ref 92–100)
OXYHGB MFR BLD: 96 % (ref 92–100)
OXYHGB MFR BLD: 96 % (ref 92–100)
OXYHGB MFR BLD: 98 % (ref 92–100)
OXYHGB MFR BLDA: 97 % (ref 75–100)
OXYHGB MFR BLDA: 98 % (ref 75–100)
OXYHGB MFR BLDA: 98 % (ref 75–100)
OXYHGB MFR BLDV: 84 %
OXYHGB MFR BLDV: 88 %
OXYHGB MFR BLDV: 88 %
PCO2 BLD: 39 MM HG (ref 35–45)
PCO2 BLD: 41 MM HG (ref 35–45)
PCO2 BLD: 41 MM HG (ref 35–45)
PCO2 BLD: 42 MM HG (ref 35–45)
PCO2 BLD: 43 MM HG (ref 35–45)
PCO2 BLD: 43 MM HG (ref 35–45)
PCO2 BLD: 47 MM HG (ref 35–45)
PCO2 BLDA: 42 MM HG (ref 35–45)
PCO2 BLDA: 46 MM HG (ref 35–45)
PCO2 BLDA: 47 MM HG (ref 35–45)
PCO2 BLDV: 48 MM HG (ref 40–50)
PCO2 BLDV: 48 MM HG (ref 40–50)
PCO2 BLDV: 50 MM HG (ref 40–50)
PH BLD: 7.39 PH (ref 7.35–7.45)
PH BLD: 7.4 PH (ref 7.35–7.45)
PH BLD: 7.4 PH (ref 7.35–7.45)
PH BLD: 7.42 PH (ref 7.35–7.45)
PH BLD: 7.43 PH (ref 7.35–7.45)
PH BLD: 7.45 PH (ref 7.35–7.45)
PH BLDA: 7.37 PH (ref 7.35–7.45)
PH BLDA: 7.38 PH (ref 7.35–7.45)
PH BLDA: 7.44 PH (ref 7.35–7.45)
PH BLDV: 7.35 PH (ref 7.32–7.43)
PH BLDV: 7.38 PH (ref 7.32–7.43)
PH BLDV: 7.38 PH (ref 7.32–7.43)
PHOSPHATE SERPL-MCNC: 4.3 MG/DL (ref 2.5–4.5)
PLATELET # BLD AUTO: 48 10E9/L (ref 150–450)
PLATELET # BLD AUTO: 63 10E9/L (ref 150–450)
PLATELET # BLD AUTO: 77 10E9/L (ref 150–450)
PLATELET # BLD AUTO: 83 10E9/L (ref 150–450)
PO2 BLD: 292 MM HG (ref 80–105)
PO2 BLD: 61 MM HG (ref 80–105)
PO2 BLD: 64 MM HG (ref 80–105)
PO2 BLD: 65 MM HG (ref 80–105)
PO2 BLD: 73 MM HG (ref 80–105)
PO2 BLD: 78 MM HG (ref 80–105)
PO2 BLD: 79 MM HG (ref 80–105)
PO2 BLD: 84 MM HG (ref 80–105)
PO2 BLD: 85 MM HG (ref 80–105)
PO2 BLD: 87 MM HG (ref 80–105)
PO2 BLD: 90 MM HG (ref 80–105)
PO2 BLDA: 222 MM HG (ref 80–105)
PO2 BLDA: 235 MM HG (ref 80–105)
PO2 BLDA: 94 MM HG (ref 80–105)
PO2 BLDV: 53 MM HG (ref 25–47)
PO2 BLDV: 59 MM HG (ref 25–47)
PO2 BLDV: 62 MM HG (ref 25–47)
POTASSIUM SERPL-SCNC: 3.9 MMOL/L (ref 3.4–5.3)
POTASSIUM SERPL-SCNC: 4 MMOL/L (ref 3.4–5.3)
POTASSIUM SERPL-SCNC: 4.1 MMOL/L (ref 3.4–5.3)
POTASSIUM SERPL-SCNC: 4.2 MMOL/L (ref 3.4–5.3)
PROT SERPL-MCNC: 5.3 G/DL (ref 6.8–8.8)
PROT SERPL-MCNC: 5.4 G/DL (ref 6.8–8.8)
PROT SERPL-MCNC: 5.5 G/DL (ref 6.8–8.8)
PROT SERPL-MCNC: 5.7 G/DL (ref 6.8–8.8)
RADIOLOGIST FLAGS: ABNORMAL
RBC # BLD AUTO: 2.78 10E12/L (ref 3.8–5.2)
RBC # BLD AUTO: 2.81 10E12/L (ref 3.8–5.2)
RBC # BLD AUTO: 2.81 10E12/L (ref 3.8–5.2)
RBC # BLD AUTO: 2.94 10E12/L (ref 3.8–5.2)
SODIUM SERPL-SCNC: 143 MMOL/L (ref 133–144)
SODIUM SERPL-SCNC: 144 MMOL/L (ref 133–144)
SODIUM SERPL-SCNC: 146 MMOL/L (ref 133–144)
SODIUM SERPL-SCNC: 147 MMOL/L (ref 133–144)
SPECIMEN SOURCE: NORMAL
TRANSFUSION STATUS PATIENT QL: NORMAL
TROPONIN I SERPL-MCNC: 0.02 UG/L (ref 0–0.04)
TROPONIN I SERPL-MCNC: 0.03 UG/L (ref 0–0.04)
TROPONIN I SERPL-MCNC: 0.05 UG/L (ref 0–0.04)
TROPONIN I SERPL-MCNC: 0.11 UG/L (ref 0–0.04)
WBC # BLD AUTO: 12.3 10E9/L (ref 4–11)
WBC # BLD AUTO: 12.5 10E9/L (ref 4–11)
WBC # BLD AUTO: 12.9 10E9/L (ref 4–11)
WBC # BLD AUTO: 12.9 10E9/L (ref 4–11)

## 2019-09-14 PROCEDURE — P9037 PLATE PHERES LEUKOREDU IRRAD: HCPCS | Performed by: INTERNAL MEDICINE

## 2019-09-14 PROCEDURE — 40000275 ZZH STATISTIC RCP TIME EA 10 MIN

## 2019-09-14 PROCEDURE — 27211402 ZZH SENSOR NIRS OXIMETER, ADULT

## 2019-09-14 PROCEDURE — 83735 ASSAY OF MAGNESIUM: CPT | Performed by: STUDENT IN AN ORGANIZED HEALTH CARE EDUCATION/TRAINING PROGRAM

## 2019-09-14 PROCEDURE — 85610 PROTHROMBIN TIME: CPT | Performed by: STUDENT IN AN ORGANIZED HEALTH CARE EDUCATION/TRAINING PROGRAM

## 2019-09-14 PROCEDURE — 87070 CULTURE OTHR SPECIMN AEROBIC: CPT | Performed by: INTERNAL MEDICINE

## 2019-09-14 PROCEDURE — 33949 ECMO/ECLS DAILY MGMT ARTERY: CPT | Performed by: INTERNAL MEDICINE

## 2019-09-14 PROCEDURE — 85652 RBC SED RATE AUTOMATED: CPT | Performed by: STUDENT IN AN ORGANIZED HEALTH CARE EDUCATION/TRAINING PROGRAM

## 2019-09-14 PROCEDURE — 82330 ASSAY OF CALCIUM: CPT | Performed by: STUDENT IN AN ORGANIZED HEALTH CARE EDUCATION/TRAINING PROGRAM

## 2019-09-14 PROCEDURE — 40000014 ZZH STATISTIC ARTERIAL MONITORING DAILY

## 2019-09-14 PROCEDURE — 85379 FIBRIN DEGRADATION QUANT: CPT | Performed by: STUDENT IN AN ORGANIZED HEALTH CARE EDUCATION/TRAINING PROGRAM

## 2019-09-14 PROCEDURE — 25000132 ZZH RX MED GY IP 250 OP 250 PS 637: Performed by: INTERNAL MEDICINE

## 2019-09-14 PROCEDURE — 99233 SBSQ HOSP IP/OBS HIGH 50: CPT | Mod: 25 | Performed by: INTERNAL MEDICINE

## 2019-09-14 PROCEDURE — P9016 RBC LEUKOCYTES REDUCED: HCPCS | Performed by: INTERNAL MEDICINE

## 2019-09-14 PROCEDURE — 82330 ASSAY OF CALCIUM: CPT | Performed by: INTERNAL MEDICINE

## 2019-09-14 PROCEDURE — 85300 ANTITHROMBIN III ACTIVITY: CPT | Performed by: STUDENT IN AN ORGANIZED HEALTH CARE EDUCATION/TRAINING PROGRAM

## 2019-09-14 PROCEDURE — 71045 X-RAY EXAM CHEST 1 VIEW: CPT

## 2019-09-14 PROCEDURE — 85347 COAGULATION TIME ACTIVATED: CPT

## 2019-09-14 PROCEDURE — 84484 ASSAY OF TROPONIN QUANT: CPT | Performed by: STUDENT IN AN ORGANIZED HEALTH CARE EDUCATION/TRAINING PROGRAM

## 2019-09-14 PROCEDURE — 71250 CT THORAX DX C-: CPT

## 2019-09-14 PROCEDURE — 25000128 H RX IP 250 OP 636: Performed by: INTERNAL MEDICINE

## 2019-09-14 PROCEDURE — 31645 BRNCHSC W/THER ASPIR 1ST: CPT | Mod: 50 | Performed by: INTERNAL MEDICINE

## 2019-09-14 PROCEDURE — 94003 VENT MGMT INPAT SUBQ DAY: CPT

## 2019-09-14 PROCEDURE — 71045 X-RAY EXAM CHEST 1 VIEW: CPT | Mod: 77

## 2019-09-14 PROCEDURE — 83605 ASSAY OF LACTIC ACID: CPT | Performed by: INTERNAL MEDICINE

## 2019-09-14 PROCEDURE — 85027 COMPLETE CBC AUTOMATED: CPT | Performed by: STUDENT IN AN ORGANIZED HEALTH CARE EDUCATION/TRAINING PROGRAM

## 2019-09-14 PROCEDURE — 33949 ECMO/ECLS DAILY MGMT ARTERY: CPT

## 2019-09-14 PROCEDURE — P9041 ALBUMIN (HUMAN),5%, 50ML: HCPCS | Performed by: INTERNAL MEDICINE

## 2019-09-14 PROCEDURE — 94640 AIRWAY INHALATION TREATMENT: CPT

## 2019-09-14 PROCEDURE — 80053 COMPREHEN METABOLIC PANEL: CPT | Performed by: STUDENT IN AN ORGANIZED HEALTH CARE EDUCATION/TRAINING PROGRAM

## 2019-09-14 PROCEDURE — 25800030 ZZH RX IP 258 OP 636: Performed by: STUDENT IN AN ORGANIZED HEALTH CARE EDUCATION/TRAINING PROGRAM

## 2019-09-14 PROCEDURE — 25000125 ZZHC RX 250: Performed by: STUDENT IN AN ORGANIZED HEALTH CARE EDUCATION/TRAINING PROGRAM

## 2019-09-14 PROCEDURE — 83615 LACTATE (LD) (LDH) ENZYME: CPT | Performed by: STUDENT IN AN ORGANIZED HEALTH CARE EDUCATION/TRAINING PROGRAM

## 2019-09-14 PROCEDURE — 82947 ASSAY GLUCOSE BLOOD QUANT: CPT | Performed by: INTERNAL MEDICINE

## 2019-09-14 PROCEDURE — 83051 HEMOGLOBIN PLASMA: CPT | Performed by: STUDENT IN AN ORGANIZED HEALTH CARE EDUCATION/TRAINING PROGRAM

## 2019-09-14 PROCEDURE — 85384 FIBRINOGEN ACTIVITY: CPT | Performed by: STUDENT IN AN ORGANIZED HEALTH CARE EDUCATION/TRAINING PROGRAM

## 2019-09-14 PROCEDURE — 40000196 ZZH STATISTIC RAPCV CVP MONITORING

## 2019-09-14 PROCEDURE — 25000128 H RX IP 250 OP 636: Performed by: STUDENT IN AN ORGANIZED HEALTH CARE EDUCATION/TRAINING PROGRAM

## 2019-09-14 PROCEDURE — 82805 BLOOD GASES W/O2 SATURATION: CPT | Performed by: INTERNAL MEDICINE

## 2019-09-14 PROCEDURE — 31622 DX BRONCHOSCOPE/WASH: CPT

## 2019-09-14 PROCEDURE — 00000146 ZZHCL STATISTIC GLUCOSE BY METER IP

## 2019-09-14 PROCEDURE — 87205 SMEAR GRAM STAIN: CPT | Performed by: STUDENT IN AN ORGANIZED HEALTH CARE EDUCATION/TRAINING PROGRAM

## 2019-09-14 PROCEDURE — 25000132 ZZH RX MED GY IP 250 OP 250 PS 637: Performed by: STUDENT IN AN ORGANIZED HEALTH CARE EDUCATION/TRAINING PROGRAM

## 2019-09-14 PROCEDURE — 20000004 ZZH R&B ICU UMMC

## 2019-09-14 PROCEDURE — 82947 ASSAY GLUCOSE BLOOD QUANT: CPT | Performed by: STUDENT IN AN ORGANIZED HEALTH CARE EDUCATION/TRAINING PROGRAM

## 2019-09-14 PROCEDURE — 94640 AIRWAY INHALATION TREATMENT: CPT | Mod: 76

## 2019-09-14 PROCEDURE — P9041 ALBUMIN (HUMAN),5%, 50ML: HCPCS | Performed by: STUDENT IN AN ORGANIZED HEALTH CARE EDUCATION/TRAINING PROGRAM

## 2019-09-14 PROCEDURE — 87070 CULTURE OTHR SPECIMN AEROBIC: CPT | Performed by: STUDENT IN AN ORGANIZED HEALTH CARE EDUCATION/TRAINING PROGRAM

## 2019-09-14 PROCEDURE — 86140 C-REACTIVE PROTEIN: CPT | Performed by: STUDENT IN AN ORGANIZED HEALTH CARE EDUCATION/TRAINING PROGRAM

## 2019-09-14 PROCEDURE — 27210437 ZZH NUTRITION PRODUCT SEMIELEM INTERMED LITER

## 2019-09-14 PROCEDURE — 83605 ASSAY OF LACTIC ACID: CPT | Performed by: STUDENT IN AN ORGANIZED HEALTH CARE EDUCATION/TRAINING PROGRAM

## 2019-09-14 PROCEDURE — 85730 THROMBOPLASTIN TIME PARTIAL: CPT | Performed by: STUDENT IN AN ORGANIZED HEALTH CARE EDUCATION/TRAINING PROGRAM

## 2019-09-14 PROCEDURE — C9113 INJ PANTOPRAZOLE SODIUM, VIA: HCPCS | Performed by: STUDENT IN AN ORGANIZED HEALTH CARE EDUCATION/TRAINING PROGRAM

## 2019-09-14 PROCEDURE — 0BJ08ZZ INSPECTION OF TRACHEOBRONCHIAL TREE, VIA NATURAL OR ARTIFICIAL OPENING ENDOSCOPIC: ICD-10-PCS | Performed by: INTERNAL MEDICINE

## 2019-09-14 PROCEDURE — 84100 ASSAY OF PHOSPHORUS: CPT | Performed by: STUDENT IN AN ORGANIZED HEALTH CARE EDUCATION/TRAINING PROGRAM

## 2019-09-14 PROCEDURE — 85520 HEPARIN ASSAY: CPT | Performed by: STUDENT IN AN ORGANIZED HEALTH CARE EDUCATION/TRAINING PROGRAM

## 2019-09-14 PROCEDURE — 25800030 ZZH RX IP 258 OP 636: Performed by: INTERNAL MEDICINE

## 2019-09-14 PROCEDURE — 82805 BLOOD GASES W/O2 SATURATION: CPT | Performed by: STUDENT IN AN ORGANIZED HEALTH CARE EDUCATION/TRAINING PROGRAM

## 2019-09-14 RX ORDER — ALBUMIN, HUMAN INJ 5% 5 %
12.5 SOLUTION INTRAVENOUS ONCE
Status: COMPLETED | OUTPATIENT
Start: 2019-09-14 | End: 2019-09-14

## 2019-09-14 RX ORDER — LIDOCAINE HYDROCHLORIDE 10 MG/ML
INJECTION, SOLUTION EPIDURAL; INFILTRATION; INTRACAUDAL; PERINEURAL
Status: DISCONTINUED
Start: 2019-09-14 | End: 2019-09-15 | Stop reason: HOSPADM

## 2019-09-14 RX ORDER — ALBUMIN, HUMAN INJ 5% 5 %
12.5 SOLUTION INTRAVENOUS
Status: DISCONTINUED | OUTPATIENT
Start: 2019-09-14 | End: 2019-09-30

## 2019-09-14 RX ORDER — ALBUMIN (HUMAN) 12.5 G/50ML
12.5 SOLUTION INTRAVENOUS
Status: DISCONTINUED | OUTPATIENT
Start: 2019-09-14 | End: 2019-09-14

## 2019-09-14 RX ORDER — LIDOCAINE HYDROCHLORIDE 10 MG/ML
3 INJECTION, SOLUTION EPIDURAL; INFILTRATION; INTRACAUDAL; PERINEURAL ONCE
Status: COMPLETED | OUTPATIENT
Start: 2019-09-14 | End: 2019-09-14

## 2019-09-14 RX ADMIN — Medication: at 15:04

## 2019-09-14 RX ADMIN — THIAMINE HCL TAB 100 MG 100 MG: 100 TAB at 06:28

## 2019-09-14 RX ADMIN — MIDAZOLAM 4 MG/HR: 5 INJECTION INTRAMUSCULAR; INTRAVENOUS at 03:29

## 2019-09-14 RX ADMIN — PIPERACILLIN SODIUM AND TAZOBACTAM SODIUM 3.38 G: 3; .375 INJECTION, POWDER, LYOPHILIZED, FOR SOLUTION INTRAVENOUS at 09:46

## 2019-09-14 RX ADMIN — DEXMEDETOMIDINE 0.67 MCG/KG/HR: 100 INJECTION, SOLUTION, CONCENTRATE INTRAVENOUS at 15:38

## 2019-09-14 RX ADMIN — DEXMEDETOMIDINE 0.7 MCG/KG/HR: 100 INJECTION, SOLUTION, CONCENTRATE INTRAVENOUS at 05:30

## 2019-09-14 RX ADMIN — PANTOPRAZOLE SODIUM 40 MG: 40 INJECTION, POWDER, FOR SOLUTION INTRAVENOUS at 12:30

## 2019-09-14 RX ADMIN — MULTIVITAMIN 15 ML: LIQUID ORAL at 06:28

## 2019-09-14 RX ADMIN — DEXMEDETOMIDINE 0.67 MCG/KG/HR: 100 INJECTION, SOLUTION, CONCENTRATE INTRAVENOUS at 12:18

## 2019-09-14 RX ADMIN — HEPARIN SODIUM (PORCINE) LOCK FLUSH IV SOLN 100 UNIT/ML 3000 UNITS: 100 SOLUTION at 20:45

## 2019-09-14 RX ADMIN — IPRATROPIUM BROMIDE AND ALBUTEROL SULFATE 3 ML: .5; 3 SOLUTION RESPIRATORY (INHALATION) at 21:12

## 2019-09-14 RX ADMIN — ALBUMIN HUMAN 25 G: 0.05 INJECTION, SOLUTION INTRAVENOUS at 01:22

## 2019-09-14 RX ADMIN — HEPARIN SODIUM 49.75 UNITS/KG/HR: 10000 INJECTION, SOLUTION INTRAVENOUS at 16:59

## 2019-09-14 RX ADMIN — HEPARIN SODIUM 2600 UNITS/HR: 10000 INJECTION, SOLUTION INTRAVENOUS at 08:06

## 2019-09-14 RX ADMIN — ALBUMIN HUMAN 12.5 G: 0.05 INJECTION, SOLUTION INTRAVENOUS at 18:20

## 2019-09-14 RX ADMIN — DEXMEDETOMIDINE 0.7 MCG/KG/HR: 100 INJECTION, SOLUTION, CONCENTRATE INTRAVENOUS at 03:29

## 2019-09-14 RX ADMIN — ALBUMIN HUMAN 12.5 G: 0.05 INJECTION, SOLUTION INTRAVENOUS at 16:49

## 2019-09-14 RX ADMIN — LIDOCAINE HYDROCHLORIDE 3 ML: 10 INJECTION, SOLUTION EPIDURAL; INFILTRATION; INTRACAUDAL; PERINEURAL at 15:04

## 2019-09-14 RX ADMIN — DEXMEDETOMIDINE 1 MCG/KG/HR: 100 INJECTION, SOLUTION, CONCENTRATE INTRAVENOUS at 21:59

## 2019-09-14 RX ADMIN — PIPERACILLIN SODIUM AND TAZOBACTAM SODIUM 3.38 G: 3; .375 INJECTION, POWDER, LYOPHILIZED, FOR SOLUTION INTRAVENOUS at 15:47

## 2019-09-14 RX ADMIN — PIPERACILLIN SODIUM AND TAZOBACTAM SODIUM 3.38 G: 3; .375 INJECTION, POWDER, LYOPHILIZED, FOR SOLUTION INTRAVENOUS at 04:10

## 2019-09-14 RX ADMIN — POTASSIUM CHLORIDE 20 MEQ: 29.8 INJECTION, SOLUTION INTRAVENOUS at 18:38

## 2019-09-14 RX ADMIN — MIDAZOLAM 4 MG/HR: 5 INJECTION INTRAMUSCULAR; INTRAVENOUS at 21:30

## 2019-09-14 RX ADMIN — PANTOPRAZOLE SODIUM 40 MG: 40 INJECTION, POWDER, FOR SOLUTION INTRAVENOUS at 19:58

## 2019-09-14 RX ADMIN — PIPERACILLIN SODIUM AND TAZOBACTAM SODIUM 3.38 G: 3; .375 INJECTION, POWDER, LYOPHILIZED, FOR SOLUTION INTRAVENOUS at 21:51

## 2019-09-14 RX ADMIN — VANCOMYCIN HYDROCHLORIDE 2750 MG: 10 INJECTION, POWDER, LYOPHILIZED, FOR SOLUTION INTRAVENOUS at 21:52

## 2019-09-14 RX ADMIN — Medication 40 MG: at 06:27

## 2019-09-14 RX ADMIN — SENNOSIDES AND DOCUSATE SODIUM 1 TABLET: 8.6; 5 TABLET ORAL at 21:32

## 2019-09-14 RX ADMIN — IPRATROPIUM BROMIDE AND ALBUTEROL SULFATE 3 ML: .5; 3 SOLUTION RESPIRATORY (INHALATION) at 13:31

## 2019-09-14 RX ADMIN — IPRATROPIUM BROMIDE AND ALBUTEROL SULFATE 3 ML: .5; 3 SOLUTION RESPIRATORY (INHALATION) at 10:10

## 2019-09-14 RX ADMIN — DEXMEDETOMIDINE 0.7 MCG/KG/HR: 100 INJECTION, SOLUTION, CONCENTRATE INTRAVENOUS at 08:05

## 2019-09-14 RX ADMIN — ALBUMIN HUMAN 12.5 G: 0.05 INJECTION, SOLUTION INTRAVENOUS at 18:56

## 2019-09-14 RX ADMIN — DEXMEDETOMIDINE 0.67 MCG/KG/HR: 100 INJECTION, SOLUTION, CONCENTRATE INTRAVENOUS at 18:43

## 2019-09-14 RX ADMIN — Medication 75 MCG/HR: at 17:07

## 2019-09-14 RX ADMIN — DOBUTAMINE HYDROCHLORIDE 2.5 MCG/KG/MIN: 200 INJECTION INTRAVENOUS at 23:42

## 2019-09-14 RX ADMIN — DOBUTAMINE HYDROCHLORIDE 2.5 MCG/KG/MIN: 200 INJECTION INTRAVENOUS at 03:29

## 2019-09-14 ASSESSMENT — ACTIVITIES OF DAILY LIVING (ADL)
ADLS_ACUITY_SCORE: 19

## 2019-09-14 ASSESSMENT — MIFFLIN-ST. JEOR: SCORE: 2456.5

## 2019-09-14 NOTE — CONSULTS
[facility]  Pulmonary, Allergy, Critical Care and Sleep Medicine    Initial Consultation - 09/14/2019  Shira Rodriguez MRN# [id number]   Age: 59 year old YOB: 1960     Date of Admission: 9/9/2019  Reason for Consultation: ? Mucus Plug, Bronchoscopy   Requesting Team: I    Primary care provider: Anjel Busby     Assessment and Recommendations:    59yoW admitted 9/9 for PEA arrest due to PE on VA ECMO s/p catheter directed lytics and thrombectomy. Plans for decannulation but CXR shows increasing white out of left lung so CSI team consulting for bronchoscopy. 9/10 sputum grew staph aureus and again 9/12 but 9/13 NGTD.     #White out L Lung #L Pleural Effusion #Hypoxic Respiratory Failure - not responsive to suctioning. Please see bronch note. No mucus plugs seen. CXR review suggests worsening effusion on that side (graded white out most at the base at 15 degrees, no trachel deviation that occurs in mucus plugging atelectasis). Bronch showed frothy secretions in both R and L lungs suggestive of pulmonary edema. Consider Chest CT vs US to assess extent of effusion. We will sign off but please do not hesitate to contact if further questions.     Seen and discussed with Dr Cedric Irvin MD - [facility] Fellow, Pgr [phone]    Chief Complaint and History of Present Illness:    CC:  PEA Arrest    HPI: Cannot obtain as patient intubated sedated. Notable hospital course includes initiation of broad abx - vac/zosyn and patient is set for upcoming decannulation.     Review of Systems:  Cannot obtain as patient intubated, sedated     Histories, Prior to Admission Medications, Allergies:    Past Medical History:  Past Medical History:   Diagnosis Date     Chronic bronchitis (H) 07/30/2015     Contact dermatitis      Depressive disorder 1985     Eczema     HANDS     Elevated liver enzymes      H/O total knee replacement, left      History of total hip replacement 10/27/2011      Hyperlipidemia      Hypertension      Morbid obesity (H)      Osteoarthrosis     right knee     Sleep apnea      Tobacco use disorder      Past Surgical History:  Past Surgical History:   Procedure Laterality Date     ARTHROPLASTY KNEE  6/14/2012    Procedure: ARTHROPLASTY KNEE;  Left Total Knee Arthroplasty;  Surgeon: Annette Quesada MD;  Location: [facility]     ARTHROSCOPY HIP Right      C TOTAL HIP ARTHROPLASTY  07/09/04    RT     CV EXTRACORPERAL MEMBRANE OXYGENATION N/A 9/9/2019    Procedure: Extracorporeal Membrance Oxygenation;  Surgeon: Kaleb Mcfarlane MD;  Location:  [facility]     Past Social History:  Social History     Socioeconomic History     Marital status: Single     Spouse name: Not on file     Number of children: Not on file     Years of education: Not on file     Highest education level: Not on file   Occupational History     Occupation: registered nurse     Comment: [employer]   Social Needs     Financial resource strain: Not on file     Food insecurity:     Worry: Not on file     Inability: Not on file     Transportation needs:     Medical: Not on file     Non-medical: Not on file   Tobacco Use     Smoking status: Current Every Day Smoker     Packs/day: 1.00     Years: 33.00     Pack years: 33.00     Types: Cigarettes     Start date: 1/1/1982     Smokeless tobacco: Never Used     Tobacco comment: maybe   Substance and Sexual Activity     Alcohol use: Yes     Alcohol/week: 0.0 oz     Comment: occassional     Drug use: No     Sexual activity: Never   Lifestyle     Physical activity:     Days per week: Not on file     Minutes per session: Not on file     Stress: Not on file   Relationships     Social connections:     Talks on phone: Not on file     Gets together: Not on file     Attends Denominational service: Not on file     Active member of club or organization: Not on file     Attends meetings of clubs or organizations: Not on file     Relationship status: Not on file     Intimate  partner violence:     Fear of current or ex partner: Not on file     Emotionally abused: Not on file     Physically abused: Not on file     Forced sexual activity: Not on file   Other Topics Concern     Parent/sibling w/ CABG, MI or angioplasty before 65F 55M? Not Asked   Social History Narrative     Not on file     Family History:  Family History   Problem Relation Age of Onset     Thyroid Disease Mother      Hypertension Mother      Skin Cancer Mother         MMohs surgery with skin graft     Cerebrovascular Disease Mother         CVA after endarderectomy     Diabetes Mother      Breast Cancer Paternal Grandmother      Pancreatic Cancer Paternal Grandmother      Cardiovascular Paternal Aunt      Cardiovascular Paternal Uncle      Depression Other      Alcoholism Father      Thyroid Disease Sister      Alcoholism Sister      Hypertension Maternal Grandmother      Heart Disease Sister         multiple ablations     Alcoholism Sister      Prostate Cancer Paternal Grandfather      Medications:    artificial tears   Both Eyes Q8H     ipratropium - albuterol 0.5 mg/2.5 mg/3 mL  3 mL Nebulization 4x daily     multivitamins w/minerals  15 mL Per Feeding Tube Daily     pantoprazole (PROTONIX) IV  40 mg Intravenous BID     piperacillin-tazobactam  3.375 g Intravenous Q6H     senna-docusate  1 tablet Oral or Feeding Tube At Bedtime     sodium chloride (PF)  3 mL Intracatheter Q8H     vancomycin (VANCOCIN) IV  2,750 mg (central catheter) Intravenous Q24H     vitamin B1  100 mg Oral or Feeding Tube Daily     calcium chloride, IV fluid REPLACEMENT ONLY, IV fluid REPLACEMENT ONLY, glucose **OR** dextrose **OR** glucagon, fentaNYL, heparin, lidocaine 4%, lidocaine (buffered or not buffered), magnesium sulfate, magnesium sulfate, midazolam, naloxone, - MEDICATION INSTRUCTIONS -, - MEDICATION INSTRUCTIONS -, potassium chloride, potassium chloride with lidocaine, potassium chloride, potassium chloride, potassium chloride, potassium  chloride, sodium chloride (PF), sodium phosphate, sodium phosphate, sodium phosphate, sodium phosphate    Allergies:     Allergies   Allergen Reactions     Adhesive Tape Blisters     Erythromycin Rash       Physical Exam:    Temp:  [94.3  F (34.6  C)-99.3  F (37.4  C)] 98.1  F (36.7  C)  Heart Rate:  [72-91] 81  Resp:  [16-20] 16  BP: (103)/(60) 103/60  MAP:  [66 mmHg-95 mmHg] 70 mmHg  Arterial Line BP: (106-178)/(49-72) 117/52  FiO2 (%):  [40 %-50 %] 40 %  SpO2:  [90 %-100 %] 94 %    General: laying in bed, intermittently raises arms and opens eyes   HEENT: anicteric, moist mucosa  Neck: no palpable lymphadenopathy, no JVD noted  Chest: bilateral faint crackles   Cardiac: RRR no murmurs  Abdomen: Soft, obese   Extremities: some pitting edema 1+  Neuro: A&Ox3, no focal deficits   Skin: no rash noted    Laboratory, imaging, and microbiologic data:    Admission labs reviewed by me, notable for:    Labs reviewed by me, notable for: cultures NGTD     CXR imaging reviewed & interpreted by me, notable for: a graded worsening white out most at the bottom at 15 degrees suggestive of effusion, no tracheal deviation also argues against mucus plug

## 2019-09-14 NOTE — PROGRESS NOTES
Cardiology Critical Care Note - Cardiology  Raffaele Staurt M.D.  The patient remains unstable in the ICU with on-going need for ventilator support, parenteral medications for the adjustment of blood pressure and cardiac output and maintenance of renal function.      The patient is seen for prolonged ventilator management requiring oxygen and/or pressure modulation; shock requiring vasopressor and or inotropic agents; low cardiac output necessitating inotropic agents, vasopressors and afterload reducing agents; VA ECMO;  RIGHT ventricular failure Pneumonia with staph requiring antibiotic selection and monitoring with left lung white out with left pleural effusion    I personally reviewed:  Arterial and venous blood gases to assess acid base balance, oxygenation, and ventilator settings.    .  Ventilatory settings and/or supplement oxygen needs in order to obtain optimal oxygenation and electrolyte balance at low possible pressure support and inspired oxygen tension      ECMO Attending Progress Note  9/14/2019    59 year old female with a history of hypertension, hyperlipidemia, sleep apnea, morbid obesity, and chronic bronchitis who presents to the emergency department via EMS for evaluation of respiratory distress s/p intubation and PEA arrest with CTA consistent with bilateral PE transferred here for VA ECMO thrombectomy and catheter directed lytics on 9/9.  Interval events: improving RV function still critically ill on low dose pressors    Physical Exam:  Temp:  [94.3  F (34.6  C)-99.3  F (37.4  C)] 98.4  F (36.9  C)  Heart Rate:  [72-91] 81  Resp:  [16-20] 18  MAP:  [66 mmHg-137 mmHg] 89 mmHg  Arterial Line BP: (106-204)/() 173/72  FiO2 (%):  [50 %] 50 %  SpO2:  [90 %-100 %] 100 %    Intake/Output Summary (Last 24 hours) at 9/13/2019 1010  Last data filed at 9/12/2019 1000  Gross per 24 hour   Intake 5757.87 ml   Output 5355 ml   Net 402.87 ml    Ventilation Mode: CMV/AC  (Continuous Mandatory  Ventilation/ Assist Control)  FiO2 (%): 50 %  Rate Set (breaths/minute): 12 breaths/min  Tidal Volume Set (mL): 450 mL  PEEP (cm H2O): 6 cmH2O  Oxygen Concentration (%): 50 %  Resp: 18     General: no acute distress, intubated and sedated  HEENT: PERRLA, conjunctiva clear, without icterus or pallor, oropharyx clear  Cardiac: RRR nl S1S2   Lungs: clear to auscultation and percussion bilaterally anterior  Abdomen: soft, nontender, non distended, no hepatosplenomegaly or masses  Extremities: without edema or cyanosis; pulses are palpable;   Skin: normal skin appearance without worrisome lesions.   Neurologic:intubated and sedated,     Labs:  Recent Labs   Lab 09/14/19  0952 09/14/19  0753 09/14/19  0624 09/14/19  0345   PH 7.43 7.43 7.42 7.39   PCO2 42 43 41 47*   PO2 65* 84 85 292*   HCO3 28 28 27 28   O2PER 40 50 50 60      Recent Labs   Lab 09/14/19  0952 09/14/19  0345 09/13/19  2210 09/13/19  1542   WBC 12.5* 12.3* 13.1* 12.7*   HGB 8.5* 8.1* 8.4* 8.6*     Creatinine   Date Value Ref Range Status   09/14/2019 0.88 0.52 - 1.04 mg/dL Final   09/14/2019 0.98 0.52 - 1.04 mg/dL Final   09/13/2019 0.99 0.52 - 1.04 mg/dL Final   09/13/2019 1.03 0.52 - 1.04 mg/dL Final       ECMO Issues including assessments and plan on DOS 9/14/2019:  Neuro: Sedated for mechanical ventilation and ECMO.  NIRS stable  b/l  RASS goal: 0-1  CV: Cardiogenic shock.  Hemodynamically stable on norepi  Pulm: Flows unchanged at 4, Sweep unchanged at 3, Keep vent settings at rest settings:as above  FEN/Renal: Electrolytes stable w/ replacement protocols in place, Cr stable, UOP stable  Heme: ACT goal: 200, Hemoglobin stable .  Goals: if O2 sat >85% Hgb >8.  If O2 Sat <85% keep Hgb 10-12.  Minimal oozing around the ECMO cannulas.  ID: Receiving empiric antibiotics  Cannulae: Position is acceptable on exam and the available imaging.  Distal perfusion cannula is in place and patent.     I have personally reviewed the ECMO flows, oxygenation and CO2  clearance, anticoagulation, and cannula position.  I have also personally assessed the patient's systemic response with hemodynamics, oxygenation, ventilation, and bleeding.         September 14, 2019    Dr Narciso DRUMMOND

## 2019-09-14 NOTE — PROGRESS NOTES
Cardiology Progress Note    Assessment & Plan      59 year old female with a history of hypertension, hyperlipidemia, sleep apnea, morbid obesity, and chronic bronchitis who presents to the emergency department via EMS for evaluation of respiratory distress s/p intubation and PEA arrest with CTA consistent with bilateral PE transferred here for VA ECMO thrombectomy and catheter directed lytics on 9/9.    Plan for today  - Bronch  - CT chest  - Hold off decannulation for now  - PPI IV  - ECMO flow to 3L    Neurology: Intubated, sedated  --continue versed, fentanyl propofol in favor of fentanyl   - initial CTH no acute changes, moving when sedation is weaned down    Cardiovascular / Hemodynamics: PEA arrest secondary to massive PE.   CTA-PE study demonstrates bilateral distal main and proximal lobar pulmonary emboli, greater on the right with elevated RV/LV ratio. Peripheral V-A ECMO inserted for hemodynamic support for massive PE. Went to cath lab and then to IR for suction mechanical thrombectomy.  LA 8.6   TTE: dilated RV, severely reduced.   EKG: snus tachy  --ACT goal 180-200  --hold lipitor for now given likely hepatic injury during arrest  --hold ACE/ARB for now given likely reduced renal fxn after arrest  --holding beta blocker given shock   - on dobutamine at 0.5   Pulmonary: PEA arrest secondary to massive PE. See above.   Ventilation Mode: CMV/AC  (Continuous Mandatory Ventilation/ Assist Control)  FiO2 (%): 40 %  Rate Set (breaths/minute): 12 breaths/min  Tidal Volume Set (mL): 450 mL  PEEP (cm H2O): 6 cmH2O  Oxygen Concentration (%): 50 %  Resp: 23  ETT in appropriate place.  Now weaning vent requirements. CXR showing white out of left lung; yesterday it improved with suctioning but recummulated overnight; vent settings stable but would like to ensure lung patency before decannulation from ECMO; cause for white out could be pleural effusion vs plugging vs infectious vs dysfunctional lymph drainage  CXR:  Lines in stable position.   --wean vent as able  --daily CXR  --bronch today if not fruitful then will do CT scan of the chest; need drainage if indeed this is pleural effusion  - s/p thrombectomy and catheter directed lytics 9/9-9/10  Chest CTSubpleural groundglass attenuation in the right lung, possibly  pulmonary infarcts given previously seen extensive pulmonary emboli.   Patchy nodularity in the right base suggesting aspiration.  --- scheduled duonebs    GI and Nutrition: No known medical hx.    --monitor BID LFTs  - nutrition consult   --GI Prophylaxis: PPI    Renal, Fluid and Electrolytes: Cr at baseline 1.1 UOP continue to monitor.  --monitor urine output  --maintain K>3 and Mg>2    Infectious Disease: No signs of infection. Leukocytosis c/w arrest. Blood cultures collected.   --vancomycin/zosyn x5 days for ECMO  --daily blood cultures  --monitor for signs of infection given cooling, lines, and leukocytosis  - sputum culture staph aureus sensitive to zosyn    Hematology and Oncology: Receiving heparin for ECMO and  PE.   --cryo PRN fibrinogen < 200; FFP for INR >2  --Transfuse for Hgb<10  --heparin gtt for ECMO with ACT goal 160-180  --US LE w/ arterial duplex per ECMO protocol   --DVT PPX: Heparin as above  - course complicated by DIC with fibrinogen of 82 on 9/10 got 5 unit(s) of cryo   Endocrinology: No known medical history. BG elevated.  --insulin gtt   Lines: R femoral arterial and venous ECMO cannulae September 9, 2019  L femoral arterial line September 9, 2019  R radial arterial line September 9, 2019  ETT September 9, 2019  Underwood catheter September 9, 2019  OG tube September 9, 2019  Restraint: needed    Current lines are required for patient management       Family update by me today: Yes      Code Status:     The pt was discussed and evaluated with Dr. Mendez, Cardiologist, attending physician, who agrees with the assessment and plan above.     Eric Reaves MD PhD    Interval History   No  "overnight events. On dobutamine.   coags within parameters no transfusions in the last 24 hrs    Ventilation Mode: CMV/AC  (Continuous Mandatory Ventilation/ Assist Control)  FiO2 (%): 40 %  Rate Set (breaths/minute): 12 breaths/min  Tidal Volume Set (mL): 450 mL  PEEP (cm H2O): 6 cmH2O  Oxygen Concentration (%): 50 %  Resp: 23        Physical Exam   Temp: 97.9  F (36.6  C) Temp src: Esophageal BP: 103/60   Heart Rate: 88 Resp: 23 SpO2: 93 % O2 Device: Mechanical Ventilator    Vitals:    09/12/19 0400 09/13/19 0400 09/14/19 0600   Weight: (!) 177 kg (390 lb 3.4 oz) (!) 184 kg (405 lb 10.3 oz) (!) 186 kg (410 lb 0.9 oz)     Vital Signs with Ranges  Temp:  [94.3  F (34.6  C)-99.3  F (37.4  C)] 97.9  F (36.6  C)  Heart Rate:  [] 88  Resp:  [16-23] 23  BP: (103)/(60) 103/60  MAP:  [66 mmHg-95 mmHg] 71 mmHg  Arterial Line BP: (106-178)/(47-72) 123/47  FiO2 (%):  [40 %-50 %] 40 %  SpO2:  [90 %-100 %] 93 %  I/O last 3 completed shifts:  In: 6701.9 [I.V.:2651.9; NG/GT:1820]  Out: 3795 [Urine:2445; Emesis/NG output:1350]    Heart Rate: 88, Blood pressure 103/60, pulse 86, temperature 97.9  F (36.6  C), resp. rate 23, height 1.676 m (5' 5.98\"), weight (!) 186 kg (410 lb 0.9 oz), SpO2 93 %, not currently breastfeeding.  410 lbs .89 oz  GEN:  Morbidly obese sedated and intubated  CV:  Distant heart sounds   LUNGS:  Decreased breath sounds  ABD:  Active bowel sounds, soft, non-tender/non-distended.  No rebound/guarding/rigidity.  EXT:  No edema or cyanosis.  ECMO canula in place with only scant oozing   Underwood in place    Medications     dexmedetomidine 0.672 mcg/kg/hr (09/14/19 1500)     IV fluid REPLACEMENT ONLY       IV fluid REPLACEMENT ONLY       DOBUTamine 2.5 mcg/kg/min (09/14/19 1400)     fentaNYL 75 mcg/hr (09/14/19 1500)     heparin 2 unit/mL in 0.9% NaCl 3 mL/hr (09/14/19 1500)     HEParin 2,800 Units/hr (09/14/19 1500)     insulin (regular) Stopped (09/14/19 0800)     midazolam 4 mg/hr (09/14/19 1500)     - " MEDICATION INSTRUCTIONS -       - MEDICATION INSTRUCTIONS -       sodium chloride Stopped (09/10/19 2005)       artificial tears   Both Eyes Q8H     ipratropium - albuterol 0.5 mg/2.5 mg/3 mL  3 mL Nebulization 4x daily     lidocaine (PF)         multivitamins w/minerals  15 mL Per Feeding Tube Daily     pantoprazole (PROTONIX) IV  40 mg Intravenous BID     piperacillin-tazobactam  3.375 g Intravenous Q6H     senna-docusate  1 tablet Oral or Feeding Tube At Bedtime     sodium chloride (PF)  3 mL Intracatheter Q8H     vancomycin (VANCOCIN) IV  2,750 mg (central catheter) Intravenous Q24H     vitamin B1  100 mg Oral or Feeding Tube Daily       Data   Recent Labs   Lab 09/14/19  0952 09/14/19  0345 09/13/19  2210   WBC 12.5* 12.3* 13.1*   HGB 8.5* 8.1* 8.4*   MCV 95 95 95   PLT 83* 77* 84*   INR 1.20* 1.21* 1.24*   * 146* 147*   POTASSIUM 4.1 4.2 4.4   CHLORIDE 113* 113* 113*   CO2 26 27 27   BUN 35* 39* 38*   CR 0.88 0.98 0.99   ANIONGAP 8 5 6   MARIA INES 8.4* 8.2* 8.2*   *  108* 115*  117* 115*  116*   ALBUMIN 2.1* 2.2* 1.9*   PROTTOTAL 5.4* 5.3* 5.2*   BILITOTAL 0.6 0.5 0.4   ALKPHOS 77 76 67   ALT 38 37 44   AST 18 14 14   TROPI 0.052* 0.112* 0.192*       Recent Results (from the past 24 hour(s))   XR Chest Port 1 View   Result Value    Radiologist flags Increased attenuation of the left hemithorax (Urgent)    Narrative    EXAM: XR CHEST PORT 1 VW  9/14/2019 3:05 AM      HISTORY: Check endotracheal tube placement and ECLS cannula placement.  DO NOT log-roll patient.  Place film under patient using patient  safety handling process.    COMPARISON: Radiographs 9/13/2019    FINDINGS: Supine radiograph of the chest. Enteric tube courses beyond  the field-of-view. Endotracheal tube tip projects over the midthoracic  trachea. Unchanged inferior approach cannulation. Right IJ central  line tip projects over the superior cavoatrial junction.    The trachea is midline. The cardiac silhouette is obscured.  Increased  diffuse attenuation of the left hemithorax, new since 913 0416 hours  exam and similar to 913 0127 hours exam. Increased right basilar and  perihilar opacities. Upper abdomen is unremarkable.       Impression    IMPRESSION:   1. Increased diffuse attenuation of the left hemithorax, similar to  yesterday's early-morning radiograph, again suggesting mucous  plugging. Consider suction and repeating radiograph.  2. Increased right perihilar and basilar opacities, suggesting  worsening pulmonary edema and/or atelectasis.    [Urgent Result: Increased attenuation of the left hemithorax  suggesting mucous plugging.]    Finding was identified on 9/14/2019 4:54 AM.     Station 4E RN Kayla was contacted by Dr. Oden at 9/14/2019 4:56  AM and verbalized understanding of the urgent finding and will suction  and repeat radiograph.     I have personally reviewed the examination and initial interpretation  and I agree with the findings.    LAURA ESCOBEDO MD   XR Chest Port 1 View    Narrative    EXAM: XR CHEST PORT 1 VW  9/14/2019 7:01 AM      HISTORY: 59 yof w/ b/l PE and staph pna recently on VA ECMO. Recent  imaging c/w mucus plugging, now s/p suctioning. Eval for interval  improvement    COMPARISON: 9/14/2019 0234 hours    FINDINGS: Semiupright AP radiograph of the chest. Endotracheal tube  tip projects over the midthoracic trachea. Enteric tubes course beyond  the field-of-view. Right IJ central line tip projects over the  superior cavoatrial junction.    The trachea is midline. The cardiac silhouette is partially obscured.  Improved aeration in the left upper lobe. The remaining significant  attenuation in the left hemithorax. Unchanged patchy right perihilar  and basilar opacities. Upper abdomen is unremarkable.       Impression    IMPRESSION:   1. Mildly improved aeration in the left upper lobe, however there  remains significant attenuation in the left hemithorax, suggesting  segmental  atelectasis/consolidation with left pleural effusion,  increased from 9/13/2019.  2. Unchanged patchy right perihilar and basilar opacities suggesting  atelectasis and/or pulmonary edema.    I have personally reviewed the examination and initial interpretation  and I agree with the findings.    LAURA ESCOBEDO MD     I have seen and examined the patient with the CSI team. I agree with the assessment and plan of the note above.I have reviewed pertinent labs.     Raffaele Stuart MD  Interventional Cardiology  Pager: [phone]

## 2019-09-14 NOTE — PROGRESS NOTES
ECMO Shift Summary:    Patient remains on V/A ECMO, all equipment is functioning and alarms are appropriately set. RPM's 3200 with flow range 3.10-3.34 L/min. Sweep gas is at 0.5 LPM and FiO2 50%. Circuit remains free of air, clot and fibrin. Cannulas are secure with no bleeding from site. Extremities are warm. Suctioned ETT for clotted old blood with lavage.    Significant Shift Events: none    Vent settings:  Ventilation Mode: CMV/AC  (Continuous Mandatory Ventilation/ Assist Control)  FiO2 (%): 50 %  Rate Set (breaths/minute): 12 breaths/min  Tidal Volume Set (mL): 450 mL  PEEP (cm H2O): 6 cmH2O  Oxygen Concentration (%): 50 %  Resp: 20  .    Heparin is running at 2600 u//hr, ACT range 163-171.    Urine output is as charted, blood loss was as charted. Product given included 500ml Albumin, and 1 PRBC.      Intake/Output Summary (Last 24 hours) at 2019 0624  Last data filed at 2019 0600  Gross per 24 hour   Intake 6250.02 ml   Output 3710 ml   Net 2540.02 ml       ECHO:  No results found for this or any previous visit.  Results for orders placed in visit on 18   Echocardiogram Complete    Narrative [id number]  [id number]  [id number]  [id number]^CARRINGTON^VINOD^WILLIS           [facility]  Diagnostic and Treamtent-3rd Floor  [address] [place] [id number]     Name: XAVIER ROWELL  MRN: [id number]  : 1960  Study Date: 2018 05:59 PM  Age: 58 yrs  Gender: Female  Patient Location: [facility]  Reason For Study: Cough, Edema extremities  Ordering Physician: VINOD SHULTZ  Referring Physician: VINOD SHULTZ  Performed By: Ryley Ridley RDCS     BSA: 2.7 m2  Height: 66 in  Weight: 418 lb  HR: 85  BP: 148/77 mmHg  _____________________________________________________________________________  __        Procedure  Echocardiogram with two-dimensional, color and spectral Doppler  performed.  _____________________________________________________________________________  __        Interpretation Summary  Moderate concentric wall thickening consistent with left ventricular  hypertrophy is present.  Biventricular size and systolic function is normal.The LVEF is 55-60%.  No significant valve disease.  Inferior vena cava appers dilated measuring 2.4 cm  _____________________________________________________________________________  __        Left Ventricle  Left ventricular size is normal. Left ventricular function is normal.The EF is  55-60%. Moderate concentric wall thickening consistent with left ventricular  hypertrophy is present. Left ventricular diastolic function is indeterminate.  Regional wall motion is probably normal. Endocardial border definition  suboptimal and contrast not given.     Right Ventricle  The right ventricle is normal size. Global right ventricular function is  normal.     Atria  Both atria appear normal.     Mitral Valve  Mild mitral annular calcification is present. Trace mitral insufficiency is  present.        Aortic Valve  Mild aortic valve sclerosis is present. No aortic regurgitation is present.  Transvalvular Doppler is normal and without signs of signifcant stenosis.     Tricuspid Valve  The peak velocity of the tricuspid regurgitant jet is not obtainable.  Pulmonary artery systolic pressure cannot be assessed.     Pulmonic Valve  The pulmonic valve cannot be assessed.     Vessels  The aorta root is normal. Inferior vena cava appers dilated measuring 2.4 cm.  Ascending aorta 3.3 cm.     Pericardium  No pericardial effusion is present.     _____________________________________________________________________________  __  MMode/2D Measurements & Calculations  IVSd: 1.5 cm  LVIDd: 5.4 cm  LVIDs: 3.7 cm  LVPWd: 1.3 cm  FS: 30.5 %  LV mass(C)d: 323.5 grams  LV mass(C)dI: 118.2 grams/m2     asc Aorta Diam: 3.3 cm  LVOT diam: 2.2 cm  LVOT area: 4.0 cm2  LA Volume (BP):  71.6 ml  LA Volume Index (BP): 26.1 ml/m2  RWT: 0.47        Doppler Measurements & Calculations  MV E max emile: 67.4 cm/sec  MV A max emile: 88.8 cm/sec  MV E/A: 0.76  MV dec time: 0.17 sec  Ao V2 max: 203.2 cm/sec  Ao max P.0 mmHg  MERON(V,D): 2.3 cm2     LV V1 max P.5 mmHg  LV V1 max: 116.8 cm/sec  AV Emile Ratio (DI): 0.57  E/E' av.0  Lateral E/e': 13.4  Medial E/e': 12.6     _____________________________________________________________________________  __           Report approved by: Tariq Pappas 2018 10:50 AM          CXR:  Recent Results (from the past 24 hour(s))   XR Chest Port 1 View    Narrative    EXAM: XR CHEST PORT 1 VW  2019 3:05 AM      HISTORY: Check endotracheal tube placement and ECLS cannula placement.  DO NOT log-roll patient.  Place film under patient using patient  safety handling process.    COMPARISON: Radiographs 2019    FINDINGS: Supine radiograph of the chest. Enteric tube courses beyond  the field-of-view. Endotracheal tube tip projects over the midthoracic  trachea. Unchanged inferior approach cannulation. Right IJ central  line tip projects over the superior cavoatrial junction.    The trachea is midline. The cardiac silhouette is obscured. Increased  diffuse attenuation of the left hemithorax, new since 913 0416 hours  exam and similar to 913 0127 hours exam. Increased right basilar and  perihilar opacities. Upper abdomen is unremarkable.       Impression    IMPRESSION:   1. Increased diffuse attenuation of the left hemithorax, similar to  yesterday's early-morning radiograph, again suggesting mucous  plugging. Consider suction and repeating radiograph.  2. Increased right perihilar and basilar opacities, suggesting  worsening pulmonary edema and/or atelectasis.       Labs:  Recent Labs   Lab 19  0345 19  0153 19  2357 19  2210   PH 7.39 7.42 7.45 7.43   PCO2 47* 42 39 43   PO2 292* 79* 87 76*   HCO3 28 27 27 28   O2PER 60 50  50 50.0       Lab Results   Component Value Date    HGB 8.1 (L) 09/14/2019    PHGB <30 09/14/2019    PLT 77 (L) 09/14/2019    FIBR 744 (H) 09/14/2019    INR 1.21 (H) 09/14/2019    PTT 84 (H) 09/14/2019    DD 7.3 (H) 09/14/2019    AXA 0.39 09/14/2019    ANTCH 62 (L) 09/13/2019         Plan is take down to OR for decannulation.      Anay Rosario, RT, RRT  9/14/2019 6:24 AM

## 2019-09-14 NOTE — ANESTHESIA PREPROCEDURE EVALUATION
Anesthesia Pre-Procedure Evaluation    Patient: Shira Rodriguez   MRN:     [id number] Gender:   female   Age:    59 year old :      1960        Preoperative Diagnosis: Pulmonary Embolism   Procedure(s):  REMOVAL, CANNULA, ADULT, FOR ECMO     Past Medical History:   Diagnosis Date     Chronic bronchitis (H) 2015     Contact dermatitis      Depressive disorder 1985     Eczema     HANDS     Elevated liver enzymes      H/O total knee replacement, left      History of total hip replacement 10/27/2011     Hyperlipidemia      Hypertension      Morbid obesity (H)      Osteoarthrosis     right knee     Sleep apnea      Tobacco use disorder       Past Surgical History:   Procedure Laterality Date     ARTHROPLASTY KNEE  2012    Procedure: ARTHROPLASTY KNEE;  Left Total Knee Arthroplasty;  Surgeon: Annette Quesada MD;  Location: [facility] OR     ARTHROSCOPY HIP Right      C TOTAL HIP ARTHROPLASTY  04    RT     CV EXTRACORPERAL MEMBRANE OXYGENATION N/A 2019    Procedure: Extracorporeal Membrance Oxygenation;  Surgeon: Kaleb Mcfarlane MD;  Location:  [facility]          Anesthesia Evaluation     .             ROS/MED HX    ENT/Pulmonary: Comment: Ventilation Mode: CMV/AC  (Continuous Mandatory Ventilation/ Assist Control)  FiO2 (%): 50 %  Rate Set (breaths/minute): 12 breaths/min  Tidal Volume Set (mL): 450 mL  PEEP (cm H2O): 6 cmH2O  Oxygen Concentration (%): 50 %  Resp: 13    Aspiration Pneumonia    (+)sleep apnea, , . .    Neurologic: Comment: Precedex/fentanyl/midazolam gtt      Cardiovascular: Comment: PEA arrest secondary to massive PE, on peripheral VA ECMO, s/p IR suction mechanical thrombectomy and catheter directed lytics.    On dobutamine @2.5    Interpretation Summary 2019  VA ECMO turndown study from 4.2 LPM to 1 LPM. Views technically challenging.  4.2 LPM: LV EF 30% range. AV opens with every beat.  3 LPM: LV EF 40% range.  2 LPM: LV EF 45% range.  1 LPM: LV EF 50%  range.  RV is difficult to assess, but appears probably mildly to moderately  dysfunctional at 1 LPM support.    (+) hypertension----. Taking blood thinners : . . . :. .       METS/Exercise Tolerance:     Hematologic:     (+) Anemia, Other Hematologic Disorder-DIC 9/10/2109; thrombocytopenia      Musculoskeletal:   (+) arthritis,  -       GI/Hepatic:         Renal/Genitourinary:         Endo:     (+) Obesity (BMI 65), .      Psychiatric:         Infectious Disease:         Malignancy:         Other:                     JZ[facility] AN PHYSICAL EXAM    LABS:  CBC:   Lab Results   Component Value Date    WBC 12.3 (H) 09/14/2019    WBC 13.1 (H) 09/13/2019    HGB 8.1 (L) 09/14/2019    HGB 8.4 (L) 09/13/2019    HCT 26.3 (L) 09/14/2019    HCT 26.7 (L) 09/13/2019    PLT 77 (L) 09/14/2019    PLT 84 (L) 09/13/2019     BMP:   Lab Results   Component Value Date     (H) 09/14/2019     (H) 09/13/2019    POTASSIUM 4.2 09/14/2019    POTASSIUM 4.4 09/13/2019    CHLORIDE 113 (H) 09/14/2019    CHLORIDE 113 (H) 09/13/2019    CO2 27 09/14/2019    CO2 27 09/13/2019    BUN 39 (H) 09/14/2019    BUN 38 (H) 09/13/2019    CR 0.98 09/14/2019    CR 0.99 09/13/2019     (H) 09/14/2019     (H) 09/14/2019     COAGS:   Lab Results   Component Value Date    PTT 84 (H) 09/14/2019    INR 1.21 (H) 09/14/2019    FIBR 744 (H) 09/14/2019     POC:   Lab Results   Component Value Date     (H) 09/14/2019     OTHER:   Lab Results   Component Value Date    PH 7.39 09/14/2019    LACT 0.6 (L) 09/13/2019    A1C 5.6 09/10/2019    MARIA INES 8.2 (L) 09/14/2019    PHOS 4.3 09/14/2019    MAG 2.1 09/14/2019    ALBUMIN 2.2 (L) 09/14/2019    PROTTOTAL 5.3 (L) 09/14/2019    ALT 37 09/14/2019    AST 14 09/14/2019    ALKPHOS 76 09/14/2019    BILITOTAL 0.5 09/14/2019    LIPASE 165 04/11/2017    TSH 1.81 04/23/2019    T4 1.02 04/23/2019    .0 (H) 09/14/2019    SED 85 (H) 09/14/2019        Preop Vitals    BP Readings from Last 3 Encounters:  "  09/09/19 119/77   08/12/19 132/81   06/26/19 134/80    Pulse Readings from Last 3 Encounters:   09/10/19 86   09/09/19 130   08/12/19 84      Resp Readings from Last 3 Encounters:   09/14/19 20   09/09/19 16   08/12/19 16    SpO2 Readings from Last 3 Encounters:   09/14/19 97%   09/09/19 96%   08/12/19 92%      Temp Readings from Last 1 Encounters:   09/14/19 36.9  C (98.4  F)    Ht Readings from Last 1 Encounters:   09/10/19 1.676 m (5' 5.98\")      Wt Readings from Last 1 Encounters:   09/13/19 (!) 184 kg (405 lb 10.3 oz)    Estimated body mass index is 65.5 kg/m  as calculated from the following:    Height as of this encounter: 1.676 m (5' 5.98\").    Weight as of this encounter: 184 kg (405 lb 10.3 oz).     LDA:  Peripheral IV 09/09/19 Left Hand (Active)   Site Assessment [facility] 9/14/2019  4:00 AM   Line Status Saline locked 9/14/2019  4:00 AM   Phlebitis Scale 0-->no symptoms 9/13/2019  8:00 PM   Infiltration Scale 0 9/13/2019  8:00 PM   Extravasation? No 9/13/2019  8:00 PM   Dressing Intervention New dressing  9/10/2019  4:00 PM   Number of days: 5       Peripheral IV 09/09/19 Right Upper forearm (Active)   Site Assessment [facility] 9/14/2019  4:00 AM   Line Status Infusing;Checked every 1-2 hour 9/14/2019  4:00 AM   Phlebitis Scale 0-->no symptoms 9/13/2019  8:00 PM   Infiltration Scale 0 9/13/2019  8:00 PM   Extravasation? No 9/13/2019  8:00 PM   Dressing Intervention New dressing  9/12/2019 12:00 AM   Number of days: 5       Arterial Line 09/09/19 Radial (Active)   Site Assessment [facility] 9/14/2019  4:00 AM   Line Status Pulsatile blood flow 9/14/2019  4:00 AM   Arterine Line Cap Change Due 09/17/19 9/14/2019  4:00 AM   Art Line Waveform Appropriate 9/14/2019  4:00 AM   Art Line Interventions Leveled;Connections checked and tightened;Flushed per protocol 9/14/2019  4:00 AM   Color/Movement/Sensation Capillary refill less than 3 sec 9/14/2019  4:00 AM   Line Necessity Yes, meets criteria 9/14/2019  4:00 AM   Dressing " Type Transparent 9/14/2019  4:00 AM   Dressing Status Clean, dry, intact 9/14/2019  4:00 AM   Dressing Change Due 09/15/19 9/14/2019  4:00 AM   Number of days: 5       CVC Triple Lumen 09/10/19 Right Internal jugular (Active)   Site Assessment [facility] 9/14/2019  4:00 AM   Dressing Intervention Chlorhexidine sponge;Transparent 9/14/2019  4:00 AM   Dressing Change Due 09/15/19 9/14/2019  4:00 AM   CVC Comment small amount of drid drainage 9/13/2019  8:00 PM   Lumen A - Color BROWN 9/14/2019  4:00 AM   Lumen A - Status infusing 9/14/2019  4:00 AM   Lumen A - Cap Change Due 09/17/19 9/14/2019  4:00 AM   Lumen B - Color BLUE 9/14/2019  4:00 AM   Lumen B - Status infusing 9/14/2019  4:00 AM   Lumen B - Cap Change Due 09/17/19 9/14/2019  4:00 AM   Lumen C - Color WHITE 9/14/2019  4:00 AM   Lumen C - Status saline locked 9/14/2019  4:00 AM   Lumen C - Cap Change Due 09/17/19 9/14/2019  4:00 AM   Extravasation? No 9/13/2019  8:00 PM   Number of days: 4       Arterial Sheath  (Active)   Specific Qualities Sutured 9/14/2019  4:00 AM   Site Assessment [facility] 9/14/2019  4:00 AM   Dressing Type Transparent 9/14/2019  4:00 AM   Arterial Sheath Comment ECMO 9/14/2019  4:00 AM   Number of days: 5       Arterial Sheath  (Active)   Specific Qualities Sutured 9/14/2019  4:00 AM   Site Assessment [facility] 9/14/2019  4:00 AM   Dressing Type Transparent 9/14/2019  4:00 AM   Arterial Sheath Comment Reperfusion cath 9/14/2019  4:00 AM   Number of days: 5       Venous Sheath (Active)   Specific Qualities Sutured 9/14/2019  4:00 AM   Site Assessment [facility] 9/14/2019  4:00 AM   Dressing Type Transparent 9/14/2019  4:00 AM   Venous Sheath Comment ECMO 9/14/2019  4:00 AM   Number of days: 5       ETT (adult) 7.5 (Active)   Secured By Commercial tube [email or web address] 9/14/2019  4:00 AM   Site Appearance Clean and dry 9/14/2019  4:00 AM   Secured at (cm) to lip 24 cm 9/14/2019  4:00 AM   Site of ET Tube Securement At lip 9/14/2019  4:00 AM   Cuff Pressure - Type minimal  leak technique 9/14/2019  4:00 AM   Cuff Pressure - cm H2O 30 cmH20 9/9/2019  8:48 PM   Tube Care/Reposition repositioned tube center of mouth 9/14/2019  4:00 AM   Bite Block Secure and Patent 9/14/2019  4:00 AM   Safety Measures manual resuscitator at bedside 9/14/2019  4:00 AM   Number of days: 5       NG/OG Tube 10 fr Right nostril (Active)   Site Description [facility] 9/14/2019  4:00 AM   Status Enteral Feedings 9/14/2019  4:00 AM   Placement Check [place] unchanged 9/14/2019  4:00 AM   [place] (cm marking) at nare/mouth 119 cm 9/14/2019  4:00 AM   Intake (ml) 50 ml 9/14/2019 12:00 AM   Flush/Free Water (mL) 100 mL 9/14/2019  4:00 AM   Number of days: 4       NG/OG Tube Orogastric (Active)   Site Description [facility] 9/14/2019  4:00 AM   Status Low continuous suction 9/14/2019  4:00 AM   Drainage Appearance Dark Red 9/14/2019  4:00 AM   Placement Check [place] unchanged 9/14/2019  4:00 AM   [place] (cm marking) at nare/mouth 56 cm 9/13/2019  4:00 AM   Flush/Free Water (mL) 30 mL 9/13/2019  8:00 PM   Container Amount 500 mL 9/14/2019  4:00 AM   Output (ml) 300 ml 9/14/2019  4:00 AM   Number of days: 4       Urethral Catheter 16 fr (Active)   Tube Description Positional 9/13/2019  4:00 AM   Catheter Care Done;Catheter wipes 9/13/2019  9:00 PM   Collection Container Standard 9/14/2019  4:00 AM   Securement Method Securing device (Describe) 9/14/2019  4:00 AM   Rationale for Continued Use Strict 1-2 Hour I&O 9/14/2019  4:00 AM   Urine Output 100 mL 9/14/2019  5:00 AM   Number of days: 3        Assessment:   ASA SCORE: 4 emergent   H&P: History and physical reviewed and following examination; no interval change.   Smoking Status:  Non-Smoker/Unknown   NPO Status: NPO Appropriate     Plan:   Anes. Type:  General   Pre-Medication: None   Induction:  IV (Standard)   Airway: ETT; Oral   Access/Monitoring: PIV   Maintenance: Balanced     Postop Plan:   Postop Pain: Opioids  Postop Sedation/Airway: SECURED AIRWAY  likely  Disposition: ICU     PONV Management:   Adult Risk Factors: Female, Postop Opioids   Prevention:, Other                   Richmond Schumacher MD

## 2019-09-14 NOTE — PROGRESS NOTES
"Bronchoscopy Risk Assessment Guidelines      A. Patient symptoms to consider when assessing pulmonary TB risk are:    I. Cough greater than 3 weeks; and fever, hemoptysis, pleuritic chest    pain, weight loss greater than 10 lbs, night sweats, fatigue, infiltrates on    upper lobes or superior segments of lower lobes, cavitation on chest    x-ray.   B. Patient risk factors to consider when assessing pulmonary TB risk are:    I. Exposure to known TB case, foreign-born persons (within 5 years of    arrival to [place]), residence in a crowded setting (correctional facility,     long-term care center, etc.), persons with HIV or immunosuppression.    Patients with symptoms and risk factors should generally be considered \"suspect risk\" and bronchoscopies should be performed in airborne precautions.    This patient has NO KNOWN RISK of Tuberculosis (proceed with bronchoscopy)    Specimens sent: yes  Complications: None  Scope used: #[id number] Adult  Attending Physician: Cedric Faulkner, RT on 9/14/2019 at 3:22 PM  "

## 2019-09-14 NOTE — PHARMACY-VANCOMYCIN DOSING SERVICE
"Pharmacy Vancomycin Consult Note    Date of Service 2019  Patient's  1960   59 year old, female    Indication: Aspiration Pneumonia  Goal Trough Level: 15-20 mg/L  Day of Therapy: 4  Current Vancomycin regimen: 2750 mg IV q24h    Current estimated CrCl = Estimated Creatinine Clearance: 102.6 mL/min (based on SCr of 1.03 mg/dL).    Creatinine for last 3 days  9/10/2019: 10:06 PM Creatinine 1.63 mg/dL  2019:  3:46 AM Creatinine 1.82 mg/dL; 10:05 AM Creatinine 1.67 mg/dL;  4:00 PM Creatinine 1.65 mg/dL;  9:57 PM Creatinine 1.49 mg/dL  2019:  3:42 AM Creatinine 1.43 mg/dL;  9:41 AM Creatinine 1.25 mg/dL;  3:19 PM Creatinine 1.26 mg/dL;  9:59 PM Creatinine 1.14 mg/dL  2019:  3:59 AM Creatinine 1.13 mg/dL; 10:02 AM Creatinine 1.08 mg/dL;  3:42 PM Creatinine 1.03 mg/dL    Recent Vancomycin Levels (past 3 days)  2019:  3:46 AM Vancomycin Level 17.2 mg/L  2019:  3:19 PM Vancomycin Level 12.4 mg/L  2019:  8:01 PM Vancomycin Level 13.6 mg/L    Body mass index is 65.5 kg/m .  Height is 5' 5.984\".   Wt Readings from Last 2 Encounters:   19 (!) 184 kg (405 lb 10.3 oz)   19 (!) 178.6 kg (393 lb 11.9 oz)       Vancomycin IV Administrations (past 72 hours)                   vancomycin (VANCOCIN) 2,750 mg in sodium chloride 0.9 % 250 mL intermittent infusion (mg) 2,750 mg New Bag 19     2,750 mg New Bag 19              Nephrotoxins and other renal medications (From now, onward)    Start     Dose/Rate Route Frequency Ordered Stop    19  vancomycin (VANCOCIN) 2,750 mg in sodium chloride 0.9 % 250 mL intermittent infusion      2,750 mg (central catheter)  over 180 Minutes Intravenous EVERY 24 HOURS 09/12/19 2019      09/10/19 0445  piperacillin-tazobactam (ZOSYN) 3.375 g vial to attach to  mL bag      3.375 g  over 30 Minutes Intravenous EVERY 6 HOURS 19 2238 09/15/19 0444    19 2245  norepinephrine (LEVOPHED) 16 mg in NS " 250 mL infusion      0.03-0.4 mcg/kg/min × 178.6 kg  5-67 mL/hr  Intravenous CONTINUOUS 09/09/19 2238      09/09/19 2245  vasopressin (VASOSTRICT) 40 Units in D5W 40 mL infusion      0.5-4 Units/hr  0.5-4 mL/hr  Intravenous CONTINUOUS 09/09/19 2238           Contrast Orders - past 72 hours (72h ago, onward)    None        Interpretation of levels and current regimen:  Trough level is Subtherapeutic (23 h post-dose)  Has serum creatinine changed > 50% in last 72 hours: Yes  Urine output: good urine output  Renal Function: Improving    Plan:  1.  Continue current dose for now and recheck level in 2 days. We may see further vancomycin accumulation at q24h interval given that doses between 9/11 and 9/12 were given 30 hours apart.  2.  Pharmacy will check trough levels as appropriate in 1-3 Days.    3.  Serum creatinine levels will be ordered daily for the first week of therapy and at least twice weekly for subsequent weeks.      Lexi Tejeda, PharmD  September 13, 2019              .

## 2019-09-14 NOTE — PROCEDURES
BRONCHOSCOPY     PROCEDURE: Bronchoscopy    INDICATION: Concern for Mucus Pluggins    PROCEDURE : Eb Irvin MD    SUPERVISOR: Jeff Steele MD    CONSENT: Obtained and in the paper chart     UNIVERSAL PROTOCOL: Patient Identification was verified, time out was performed. Imaging data reviewed. Barrier precaution done: Hands washed, mask, gloves, gown, and eye protection all used.    MEDICATIONS: Pt on versed gtt and fentanyl gtt, bumps of 2 and 50 respectively given. 1% lidocaine at cords given.     PROCEDURE: Patient monitored during entire procedure. Bronch thru the ETT. The iain was sharp. There were frothy white vs pink tinged secretions throughout both right and left lung. Quick inspection of R mainstem bronchus, RUL and BI had these findings. Similarly, view of L mainstem bronchus, right upper take off and LLL showed the same with mildly more blood tinged secretions on the left. Overall, no mucus plugs visualized. There was some suction trauma visile in the trache. About 10 ml of aspirate were sent for gram stain and culture.     Dr Steele was present for the procedure.    COMPLICATIONS: None    IMPRESSIONS: No mucus plugs    RECOMMENDATIONS: Consider pulmonary edema as culprit etiology for CXR findings vs increasing pleural effusion     Eb Irvin MD  [facility] Fellow, Pgr [phone]

## 2019-09-14 NOTE — PLAN OF CARE
Neuro- Unchanged; responds to voice. Localizes pain. PERRL. Fentanyl for pain, dex and midazolam for sedation.  CV- SR 70s. MAPs>65. Dobutamine 2.5mcg/kg/min. ECMO flows 3.2-3.4, 3200 RPMs, sweep 0.5. Given 500mL albumin for chugging and 1 unit PRBCs for result of 8.1 with plan to go to OR for decannulation.  Pulm- Adequate O2 saturations and pO2 on ABGs. Call from radiologist this AM after routine CXR recommending deep suctioning for what appeared to be mucous plugging. RN and RRT bag-suctioned pt and got large amounts of tan/blood-streaked secretions. Repeat CXR obtained; awaiting results. ECMO decannulation on hold d/t same.  GI- Active BS. Insulin titrated for BG.  - Adequate UOP this shift.  Skin- Repositioned as tolerated; occasionally unable to reposition to L side d/t chugging noted on ECMO.  See flow sheets for further interventions and assessments. Continuing to monitor.      Problem: Adjustment to Therapy (Extracorporeal Life Support/ECMO)  Goal: Optimal Adjustment to Therapy  Outcome: No Change     Problem: Bleeding (Extracorporeal Life Support/ECMO)  Goal: Absence of Bleeding  Outcome: No Change     Problem: Cardiac Output Decreased (Extracorporeal Life Support/ECMO)  Goal: Effective Cardiac Output  Outcome: No Change     Problem: Device-Related Complication Risk (Extracorporeal Life Support/ECMO)  Goal: Optimal Device Function  Outcome: No Change     Problem: Infection (Extracorporeal Life Support/ECMO)  Goal: Absence of Infection Signs/Symptoms  Outcome: No Change     Problem: Communication Impairment (Artificial Airway)  Goal: Effective Communication  Outcome: No Change     Problem: Device-Related Complication Risk (Artificial Airway)  Goal: Optimal Device Function  Outcome: No Change     Problem: Hypertension Comorbidity  Goal: Blood Pressure in Desired Range  Outcome: No Change     Problem: Obstructive Sleep Apnea Risk or Actual (Comorbidity Management)  Goal: Unobstructed Breathing During  Sleep  Outcome: No Change     Problem: Adult Inpatient Plan of Care  Goal: Plan of Care Review  Outcome: No Change  Goal: Patient-Specific Goal (Individualization)  Outcome: No Change  Goal: Absence of Hospital-Acquired Illness or Injury  Outcome: No Change  Goal: Optimal Comfort and Wellbeing  Outcome: No Change  Goal: Readiness for Transition of Care  Outcome: No Change  Goal: Rounds/Family Conference  Outcome: No Change

## 2019-09-15 ENCOUNTER — APPOINTMENT (OUTPATIENT)
Dept: GENERAL RADIOLOGY | Facility: CLINIC | Age: 59
DRG: 003 | End: 2019-09-15
Attending: INTERNAL MEDICINE
Payer: COMMERCIAL

## 2019-09-15 ENCOUNTER — APPOINTMENT (OUTPATIENT)
Dept: CARDIOLOGY | Facility: CLINIC | Age: 59
DRG: 003 | End: 2019-09-15
Attending: STUDENT IN AN ORGANIZED HEALTH CARE EDUCATION/TRAINING PROGRAM
Payer: COMMERCIAL

## 2019-09-15 LAB
ABO + RH BLD: NORMAL
ABO + RH BLD: NORMAL
ALBUMIN SERPL-MCNC: 2.2 G/DL (ref 3.4–5)
ALBUMIN SERPL-MCNC: 2.2 G/DL (ref 3.4–5)
ALBUMIN SERPL-MCNC: 2.3 G/DL (ref 3.4–5)
ALBUMIN SERPL-MCNC: 2.3 G/DL (ref 3.4–5)
ALP SERPL-CCNC: 78 U/L (ref 40–150)
ALP SERPL-CCNC: 82 U/L (ref 40–150)
ALP SERPL-CCNC: 88 U/L (ref 40–150)
ALP SERPL-CCNC: 93 U/L (ref 40–150)
ALT SERPL W P-5'-P-CCNC: 30 U/L (ref 0–50)
ALT SERPL W P-5'-P-CCNC: 31 U/L (ref 0–50)
ALT SERPL W P-5'-P-CCNC: 32 U/L (ref 0–50)
ALT SERPL W P-5'-P-CCNC: 34 U/L (ref 0–50)
ANION GAP SERPL CALCULATED.3IONS-SCNC: 7 MMOL/L (ref 3–14)
ANION GAP SERPL CALCULATED.3IONS-SCNC: 8 MMOL/L (ref 3–14)
APTT PPP: 72 SEC (ref 22–37)
APTT PPP: 95 SEC (ref 22–37)
APTT PPP: 96 SEC (ref 22–37)
APTT PPP: 97 SEC (ref 22–37)
AST SERPL W P-5'-P-CCNC: 15 U/L (ref 0–45)
AST SERPL W P-5'-P-CCNC: 15 U/L (ref 0–45)
AST SERPL W P-5'-P-CCNC: 18 U/L (ref 0–45)
AST SERPL W P-5'-P-CCNC: 22 U/L (ref 0–45)
AT III ACT/NOR PPP CHRO: 49 % (ref 85–135)
BACTERIA SPEC CULT: ABNORMAL
BACTERIA SPEC CULT: NO GROWTH
BASE DEFICIT BLDA-SCNC: 1.3 MMOL/L
BASE EXCESS BLDA CALC-SCNC: 0 MMOL/L
BASE EXCESS BLDA CALC-SCNC: 0.8 MMOL/L
BASE EXCESS BLDA CALC-SCNC: 1.2 MMOL/L
BASE EXCESS BLDA CALC-SCNC: 1.6 MMOL/L
BASE EXCESS BLDA CALC-SCNC: 1.9 MMOL/L
BASE EXCESS BLDA CALC-SCNC: 2 MMOL/L
BASE EXCESS BLDA CALC-SCNC: 2.3 MMOL/L
BASE EXCESS BLDA CALC-SCNC: 2.3 MMOL/L
BASE EXCESS BLDA CALC-SCNC: 2.8 MMOL/L
BASE EXCESS BLDA CALC-SCNC: 3.2 MMOL/L
BASE EXCESS BLDA CALC-SCNC: 3.2 MMOL/L
BASE EXCESS BLDA CALC-SCNC: 3.9 MMOL/L
BASE EXCESS BLDA CALC-SCNC: 4.5 MMOL/L
BASE EXCESS BLDV CALC-SCNC: 0.3 MMOL/L
BASE EXCESS BLDV CALC-SCNC: 2.4 MMOL/L
BILIRUB SERPL-MCNC: 0.9 MG/DL (ref 0.2–1.3)
BILIRUB SERPL-MCNC: 1 MG/DL (ref 0.2–1.3)
BLD GP AB SCN SERPL QL: NORMAL
BLD PROD TYP BPU: NORMAL
BLD PROD TYP BPU: NORMAL
BLD UNIT ID BPU: 0
BLOOD BANK CMNT PATIENT-IMP: NORMAL
BLOOD PRODUCT CODE: NORMAL
BPU ID: NORMAL
BUN SERPL-MCNC: 30 MG/DL (ref 7–30)
BUN SERPL-MCNC: 30 MG/DL (ref 7–30)
BUN SERPL-MCNC: 31 MG/DL (ref 7–30)
BUN SERPL-MCNC: 31 MG/DL (ref 7–30)
CA-I BLD-MCNC: 4.8 MG/DL (ref 4.4–5.2)
CA-I BLD-MCNC: 4.9 MG/DL (ref 4.4–5.2)
CALCIUM SERPL-MCNC: 8.1 MG/DL (ref 8.5–10.1)
CALCIUM SERPL-MCNC: 8.2 MG/DL (ref 8.5–10.1)
CALCIUM SERPL-MCNC: 8.3 MG/DL (ref 8.5–10.1)
CALCIUM SERPL-MCNC: 8.6 MG/DL (ref 8.5–10.1)
CHLORIDE SERPL-SCNC: 104 MMOL/L (ref 94–109)
CHLORIDE SERPL-SCNC: 106 MMOL/L (ref 94–109)
CHLORIDE SERPL-SCNC: 109 MMOL/L (ref 94–109)
CHLORIDE SERPL-SCNC: 111 MMOL/L (ref 94–109)
CO2 SERPL-SCNC: 25 MMOL/L (ref 20–32)
CO2 SERPL-SCNC: 25 MMOL/L (ref 20–32)
CO2 SERPL-SCNC: 28 MMOL/L (ref 20–32)
CO2 SERPL-SCNC: 28 MMOL/L (ref 20–32)
CREAT SERPL-MCNC: 0.85 MG/DL (ref 0.52–1.04)
CREAT SERPL-MCNC: 0.93 MG/DL (ref 0.52–1.04)
CRP SERPL-MCNC: 100 MG/L (ref 0–8)
D DIMER PPP FEU-MCNC: 11.6 UG/ML FEU (ref 0–0.5)
D DIMER PPP FEU-MCNC: 16.5 UG/ML FEU (ref 0–0.5)
ERYTHROCYTE [DISTWIDTH] IN BLOOD BY AUTOMATED COUNT: 16.1 % (ref 10–15)
ERYTHROCYTE [DISTWIDTH] IN BLOOD BY AUTOMATED COUNT: 16.1 % (ref 10–15)
ERYTHROCYTE [DISTWIDTH] IN BLOOD BY AUTOMATED COUNT: 16.2 % (ref 10–15)
ERYTHROCYTE [DISTWIDTH] IN BLOOD BY AUTOMATED COUNT: 16.3 % (ref 10–15)
ERYTHROCYTE [SEDIMENTATION RATE] IN BLOOD BY WESTERGREN METHOD: 72 MM/H (ref 0–30)
FIBRINOGEN PPP-MCNC: 733 MG/DL (ref 200–420)
FIBRINOGEN PPP-MCNC: 744 MG/DL (ref 200–420)
GFR SERPL CREATININE-BSD FRML MDRD: 67 ML/MIN/{1.73_M2}
GFR SERPL CREATININE-BSD FRML MDRD: 75 ML/MIN/{1.73_M2}
GFR SERPL CREATININE-BSD FRML MDRD: 76 ML/MIN/{1.73_M2}
GFR SERPL CREATININE-BSD FRML MDRD: 76 ML/MIN/{1.73_M2}
GLUCOSE BLD-MCNC: 108 MG/DL (ref 70–99)
GLUCOSE BLD-MCNC: 116 MG/DL (ref 70–99)
GLUCOSE BLD-MCNC: 117 MG/DL (ref 70–99)
GLUCOSE BLD-MCNC: 131 MG/DL (ref 70–99)
GLUCOSE BLDC GLUCOMTR-MCNC: 115 MG/DL (ref 70–99)
GLUCOSE BLDC GLUCOMTR-MCNC: 129 MG/DL (ref 70–99)
GLUCOSE BLDC GLUCOMTR-MCNC: 135 MG/DL (ref 70–99)
GLUCOSE SERPL-MCNC: 104 MG/DL (ref 70–99)
GLUCOSE SERPL-MCNC: 110 MG/DL (ref 70–99)
GLUCOSE SERPL-MCNC: 118 MG/DL (ref 70–99)
GLUCOSE SERPL-MCNC: 125 MG/DL (ref 70–99)
HCO3 BLD-SCNC: 24 MMOL/L (ref 21–28)
HCO3 BLD-SCNC: 25 MMOL/L (ref 21–28)
HCO3 BLD-SCNC: 26 MMOL/L (ref 21–28)
HCO3 BLD-SCNC: 26 MMOL/L (ref 21–28)
HCO3 BLD-SCNC: 27 MMOL/L (ref 21–28)
HCO3 BLD-SCNC: 28 MMOL/L (ref 21–28)
HCO3 BLD-SCNC: 29 MMOL/L (ref 21–28)
HCO3 BLDA-SCNC: 27 MMOL/L (ref 21–28)
HCO3 BLDA-SCNC: 29 MMOL/L (ref 21–28)
HCO3 BLDV-SCNC: 26 MMOL/L (ref 21–28)
HCO3 BLDV-SCNC: 29 MMOL/L (ref 21–28)
HCT VFR BLD AUTO: 25.6 % (ref 35–47)
HCT VFR BLD AUTO: 27.4 % (ref 35–47)
HCT VFR BLD AUTO: 27.8 % (ref 35–47)
HCT VFR BLD AUTO: 28.2 % (ref 35–47)
HGB BLD-MCNC: 8 G/DL (ref 11.7–15.7)
HGB BLD-MCNC: 8.6 G/DL (ref 11.7–15.7)
HGB BLD-MCNC: 8.8 G/DL (ref 11.7–15.7)
HGB BLD-MCNC: 8.9 G/DL (ref 11.7–15.7)
HGB FREE PLAS-MCNC: <30 MG/DL
INR PPP: 1.16 (ref 0.86–1.14)
INR PPP: 1.18 (ref 0.86–1.14)
INR PPP: 1.19 (ref 0.86–1.14)
INR PPP: 1.21 (ref 0.86–1.14)
KCT BLD-ACNC: 179 SEC (ref 75–150)
KCT BLD-ACNC: 191 SEC (ref 75–150)
LACTATE BLD-SCNC: 0.4 MMOL/L (ref 0.7–2)
LACTATE BLD-SCNC: 0.5 MMOL/L (ref 0.7–2)
LDH SERPL L TO P-CCNC: 391 U/L (ref 81–234)
LMWH PPP CHRO-ACNC: 0.29 IU/ML
LMWH PPP CHRO-ACNC: 0.33 IU/ML
LMWH PPP CHRO-ACNC: 0.34 IU/ML
LMWH PPP CHRO-ACNC: 0.46 IU/ML
Lab: NORMAL
Lab: NORMAL
MAGNESIUM SERPL-MCNC: 1.9 MG/DL (ref 1.6–2.3)
MAGNESIUM SERPL-MCNC: 2.1 MG/DL (ref 1.6–2.3)
MAGNESIUM SERPL-MCNC: 2.2 MG/DL (ref 1.6–2.3)
MAGNESIUM SERPL-MCNC: 2.3 MG/DL (ref 1.6–2.3)
MCH RBC QN AUTO: 29.5 PG (ref 26.5–33)
MCH RBC QN AUTO: 29.5 PG (ref 26.5–33)
MCH RBC QN AUTO: 29.7 PG (ref 26.5–33)
MCH RBC QN AUTO: 30 PG (ref 26.5–33)
MCHC RBC AUTO-ENTMCNC: 31.2 G/DL (ref 31.5–36.5)
MCHC RBC AUTO-ENTMCNC: 31.3 G/DL (ref 31.5–36.5)
MCHC RBC AUTO-ENTMCNC: 31.4 G/DL (ref 31.5–36.5)
MCHC RBC AUTO-ENTMCNC: 32 G/DL (ref 31.5–36.5)
MCV RBC AUTO: 93 FL (ref 78–100)
MCV RBC AUTO: 94 FL (ref 78–100)
MCV RBC AUTO: 95 FL (ref 78–100)
MCV RBC AUTO: 96 FL (ref 78–100)
NUM BPU REQUESTED: 6
O2/TOTAL GAS SETTING VFR VENT: 50 %
OXYHGB MFR BLD: 92 % (ref 92–100)
OXYHGB MFR BLD: 93 % (ref 92–100)
OXYHGB MFR BLD: 94 % (ref 92–100)
OXYHGB MFR BLD: 95 % (ref 92–100)
OXYHGB MFR BLD: 96 % (ref 92–100)
OXYHGB MFR BLDA: 98 % (ref 75–100)
OXYHGB MFR BLDA: 98 % (ref 75–100)
OXYHGB MFR BLDV: 86 %
OXYHGB MFR BLDV: 94 %
PCO2 BLD: 39 MM HG (ref 35–45)
PCO2 BLD: 40 MM HG (ref 35–45)
PCO2 BLD: 42 MM HG (ref 35–45)
PCO2 BLD: 42 MM HG (ref 35–45)
PCO2 BLD: 43 MM HG (ref 35–45)
PCO2 BLD: 44 MM HG (ref 35–45)
PCO2 BLDA: 46 MM HG (ref 35–45)
PCO2 BLDA: 51 MM HG (ref 35–45)
PCO2 BLDV: 47 MM HG (ref 40–50)
PCO2 BLDV: 52 MM HG (ref 40–50)
PH BLD: 7.38 PH (ref 7.35–7.45)
PH BLD: 7.38 PH (ref 7.35–7.45)
PH BLD: 7.39 PH (ref 7.35–7.45)
PH BLD: 7.4 PH (ref 7.35–7.45)
PH BLD: 7.41 PH (ref 7.35–7.45)
PH BLD: 7.42 PH (ref 7.35–7.45)
PH BLD: 7.44 PH (ref 7.35–7.45)
PH BLD: 7.46 PH (ref 7.35–7.45)
PH BLDA: 7.36 PH (ref 7.35–7.45)
PH BLDA: 7.38 PH (ref 7.35–7.45)
PH BLDV: 7.36 PH (ref 7.32–7.43)
PH BLDV: 7.36 PH (ref 7.32–7.43)
PHOSPHATE SERPL-MCNC: 4 MG/DL (ref 2.5–4.5)
PLATELET # BLD AUTO: 65 10E9/L (ref 150–450)
PLATELET # BLD AUTO: 73 10E9/L (ref 150–450)
PLATELET # BLD AUTO: 81 10E9/L (ref 150–450)
PLATELET # BLD AUTO: 94 10E9/L (ref 150–450)
PO2 BLD: 66 MM HG (ref 80–105)
PO2 BLD: 71 MM HG (ref 80–105)
PO2 BLD: 75 MM HG (ref 80–105)
PO2 BLD: 78 MM HG (ref 80–105)
PO2 BLD: 78 MM HG (ref 80–105)
PO2 BLD: 80 MM HG (ref 80–105)
PO2 BLD: 85 MM HG (ref 80–105)
PO2 BLD: 87 MM HG (ref 80–105)
PO2 BLD: 88 MM HG (ref 80–105)
PO2 BLD: 88 MM HG (ref 80–105)
PO2 BLD: 89 MM HG (ref 80–105)
PO2 BLD: 95 MM HG (ref 80–105)
PO2 BLDA: 237 MM HG (ref 80–105)
PO2 BLDA: 238 MM HG (ref 80–105)
PO2 BLDV: 57 MM HG (ref 25–47)
PO2 BLDV: 76 MM HG (ref 25–47)
POTASSIUM SERPL-SCNC: 3.7 MMOL/L (ref 3.4–5.3)
POTASSIUM SERPL-SCNC: 4 MMOL/L (ref 3.4–5.3)
POTASSIUM SERPL-SCNC: 4 MMOL/L (ref 3.4–5.3)
POTASSIUM SERPL-SCNC: 4.1 MMOL/L (ref 3.4–5.3)
PROT SERPL-MCNC: 5.3 G/DL (ref 6.8–8.8)
PROT SERPL-MCNC: 5.5 G/DL (ref 6.8–8.8)
PROT SERPL-MCNC: 5.6 G/DL (ref 6.8–8.8)
PROT SERPL-MCNC: 6 G/DL (ref 6.8–8.8)
RBC # BLD AUTO: 2.67 10E12/L (ref 3.8–5.2)
RBC # BLD AUTO: 2.92 10E12/L (ref 3.8–5.2)
RBC # BLD AUTO: 2.98 10E12/L (ref 3.8–5.2)
RBC # BLD AUTO: 3 10E12/L (ref 3.8–5.2)
SODIUM SERPL-SCNC: 139 MMOL/L (ref 133–144)
SODIUM SERPL-SCNC: 141 MMOL/L (ref 133–144)
SODIUM SERPL-SCNC: 141 MMOL/L (ref 133–144)
SODIUM SERPL-SCNC: 143 MMOL/L (ref 133–144)
SPECIMEN EXP DATE BLD: NORMAL
SPECIMEN SOURCE: ABNORMAL
SPECIMEN SOURCE: NORMAL
TRANSFUSION STATUS PATIENT QL: NORMAL
TRANSFUSION STATUS PATIENT QL: NORMAL
TROPONIN I SERPL-MCNC: <0.015 UG/L (ref 0–0.04)
VANCOMYCIN SERPL-MCNC: 13.8 MG/L
WBC # BLD AUTO: 13 10E9/L (ref 4–11)
WBC # BLD AUTO: 14.4 10E9/L (ref 4–11)
WBC # BLD AUTO: 15 10E9/L (ref 4–11)
WBC # BLD AUTO: 16 10E9/L (ref 4–11)

## 2019-09-15 PROCEDURE — 86900 BLOOD TYPING SEROLOGIC ABO: CPT

## 2019-09-15 PROCEDURE — 93325 DOPPLER ECHO COLOR FLOW MAPG: CPT | Mod: 26 | Performed by: INTERNAL MEDICINE

## 2019-09-15 PROCEDURE — 25000125 ZZHC RX 250: Performed by: STUDENT IN AN ORGANIZED HEALTH CARE EDUCATION/TRAINING PROGRAM

## 2019-09-15 PROCEDURE — 94640 AIRWAY INHALATION TREATMENT: CPT | Mod: 76

## 2019-09-15 PROCEDURE — 82330 ASSAY OF CALCIUM: CPT | Performed by: INTERNAL MEDICINE

## 2019-09-15 PROCEDURE — 85520 HEPARIN ASSAY: CPT | Performed by: STUDENT IN AN ORGANIZED HEALTH CARE EDUCATION/TRAINING PROGRAM

## 2019-09-15 PROCEDURE — 83605 ASSAY OF LACTIC ACID: CPT | Performed by: INTERNAL MEDICINE

## 2019-09-15 PROCEDURE — 83605 ASSAY OF LACTIC ACID: CPT | Performed by: STUDENT IN AN ORGANIZED HEALTH CARE EDUCATION/TRAINING PROGRAM

## 2019-09-15 PROCEDURE — 27210437 ZZH NUTRITION PRODUCT SEMIELEM INTERMED LITER

## 2019-09-15 PROCEDURE — 80202 ASSAY OF VANCOMYCIN: CPT | Performed by: INTERNAL MEDICINE

## 2019-09-15 PROCEDURE — 93308 TTE F-UP OR LMTD: CPT | Mod: 26 | Performed by: INTERNAL MEDICINE

## 2019-09-15 PROCEDURE — 83735 ASSAY OF MAGNESIUM: CPT | Performed by: STUDENT IN AN ORGANIZED HEALTH CARE EDUCATION/TRAINING PROGRAM

## 2019-09-15 PROCEDURE — 40000275 ZZH STATISTIC RCP TIME EA 10 MIN

## 2019-09-15 PROCEDURE — 00000146 ZZHCL STATISTIC GLUCOSE BY METER IP

## 2019-09-15 PROCEDURE — 85610 PROTHROMBIN TIME: CPT | Performed by: STUDENT IN AN ORGANIZED HEALTH CARE EDUCATION/TRAINING PROGRAM

## 2019-09-15 PROCEDURE — 85027 COMPLETE CBC AUTOMATED: CPT | Performed by: STUDENT IN AN ORGANIZED HEALTH CARE EDUCATION/TRAINING PROGRAM

## 2019-09-15 PROCEDURE — 80053 COMPREHEN METABOLIC PANEL: CPT | Performed by: STUDENT IN AN ORGANIZED HEALTH CARE EDUCATION/TRAINING PROGRAM

## 2019-09-15 PROCEDURE — 86140 C-REACTIVE PROTEIN: CPT | Performed by: STUDENT IN AN ORGANIZED HEALTH CARE EDUCATION/TRAINING PROGRAM

## 2019-09-15 PROCEDURE — 86850 RBC ANTIBODY SCREEN: CPT

## 2019-09-15 PROCEDURE — 25000128 H RX IP 250 OP 636: Performed by: STUDENT IN AN ORGANIZED HEALTH CARE EDUCATION/TRAINING PROGRAM

## 2019-09-15 PROCEDURE — 25000128 H RX IP 250 OP 636: Performed by: INTERNAL MEDICINE

## 2019-09-15 PROCEDURE — 84484 ASSAY OF TROPONIN QUANT: CPT | Performed by: STUDENT IN AN ORGANIZED HEALTH CARE EDUCATION/TRAINING PROGRAM

## 2019-09-15 PROCEDURE — 82330 ASSAY OF CALCIUM: CPT | Performed by: STUDENT IN AN ORGANIZED HEALTH CARE EDUCATION/TRAINING PROGRAM

## 2019-09-15 PROCEDURE — 99233 SBSQ HOSP IP/OBS HIGH 50: CPT | Mod: 25 | Performed by: INTERNAL MEDICINE

## 2019-09-15 PROCEDURE — 85300 ANTITHROMBIN III ACTIVITY: CPT | Performed by: STUDENT IN AN ORGANIZED HEALTH CARE EDUCATION/TRAINING PROGRAM

## 2019-09-15 PROCEDURE — 93321 DOPPLER ECHO F-UP/LMTD STD: CPT | Mod: 26 | Performed by: INTERNAL MEDICINE

## 2019-09-15 PROCEDURE — 85347 COAGULATION TIME ACTIVATED: CPT

## 2019-09-15 PROCEDURE — 82805 BLOOD GASES W/O2 SATURATION: CPT | Performed by: INTERNAL MEDICINE

## 2019-09-15 PROCEDURE — P9016 RBC LEUKOCYTES REDUCED: HCPCS | Performed by: INTERNAL MEDICINE

## 2019-09-15 PROCEDURE — 85730 THROMBOPLASTIN TIME PARTIAL: CPT | Performed by: STUDENT IN AN ORGANIZED HEALTH CARE EDUCATION/TRAINING PROGRAM

## 2019-09-15 PROCEDURE — 85652 RBC SED RATE AUTOMATED: CPT | Performed by: STUDENT IN AN ORGANIZED HEALTH CARE EDUCATION/TRAINING PROGRAM

## 2019-09-15 PROCEDURE — 85379 FIBRIN DEGRADATION QUANT: CPT | Performed by: STUDENT IN AN ORGANIZED HEALTH CARE EDUCATION/TRAINING PROGRAM

## 2019-09-15 PROCEDURE — 33949 ECMO/ECLS DAILY MGMT ARTERY: CPT | Performed by: INTERNAL MEDICINE

## 2019-09-15 PROCEDURE — 25800030 ZZH RX IP 258 OP 636: Performed by: STUDENT IN AN ORGANIZED HEALTH CARE EDUCATION/TRAINING PROGRAM

## 2019-09-15 PROCEDURE — 93308 TTE F-UP OR LMTD: CPT

## 2019-09-15 PROCEDURE — 83051 HEMOGLOBIN PLASMA: CPT | Performed by: STUDENT IN AN ORGANIZED HEALTH CARE EDUCATION/TRAINING PROGRAM

## 2019-09-15 PROCEDURE — 84100 ASSAY OF PHOSPHORUS: CPT | Performed by: STUDENT IN AN ORGANIZED HEALTH CARE EDUCATION/TRAINING PROGRAM

## 2019-09-15 PROCEDURE — 25800030 ZZH RX IP 258 OP 636: Performed by: INTERNAL MEDICINE

## 2019-09-15 PROCEDURE — C9113 INJ PANTOPRAZOLE SODIUM, VIA: HCPCS | Performed by: STUDENT IN AN ORGANIZED HEALTH CARE EDUCATION/TRAINING PROGRAM

## 2019-09-15 PROCEDURE — 20000004 ZZH R&B ICU UMMC

## 2019-09-15 PROCEDURE — 94003 VENT MGMT INPAT SUBQ DAY: CPT

## 2019-09-15 PROCEDURE — 83615 LACTATE (LD) (LDH) ENZYME: CPT | Performed by: STUDENT IN AN ORGANIZED HEALTH CARE EDUCATION/TRAINING PROGRAM

## 2019-09-15 PROCEDURE — 94640 AIRWAY INHALATION TREATMENT: CPT

## 2019-09-15 PROCEDURE — 71045 X-RAY EXAM CHEST 1 VIEW: CPT

## 2019-09-15 PROCEDURE — 85384 FIBRINOGEN ACTIVITY: CPT | Performed by: STUDENT IN AN ORGANIZED HEALTH CARE EDUCATION/TRAINING PROGRAM

## 2019-09-15 PROCEDURE — 86901 BLOOD TYPING SEROLOGIC RH(D): CPT

## 2019-09-15 PROCEDURE — 86923 COMPATIBILITY TEST ELECTRIC: CPT

## 2019-09-15 PROCEDURE — 82947 ASSAY GLUCOSE BLOOD QUANT: CPT | Performed by: STUDENT IN AN ORGANIZED HEALTH CARE EDUCATION/TRAINING PROGRAM

## 2019-09-15 PROCEDURE — 82805 BLOOD GASES W/O2 SATURATION: CPT | Performed by: STUDENT IN AN ORGANIZED HEALTH CARE EDUCATION/TRAINING PROGRAM

## 2019-09-15 PROCEDURE — 25000132 ZZH RX MED GY IP 250 OP 250 PS 637: Performed by: INTERNAL MEDICINE

## 2019-09-15 PROCEDURE — 82947 ASSAY GLUCOSE BLOOD QUANT: CPT | Performed by: INTERNAL MEDICINE

## 2019-09-15 PROCEDURE — 25000132 ZZH RX MED GY IP 250 OP 250 PS 637: Performed by: STUDENT IN AN ORGANIZED HEALTH CARE EDUCATION/TRAINING PROGRAM

## 2019-09-15 RX ORDER — DOBUTAMINE HCL IN DEXTROSE 5 % 500MG/250
2.5 INTRAVENOUS SOLUTION INTRAVENOUS CONTINUOUS
Status: DISCONTINUED | OUTPATIENT
Start: 2019-09-15 | End: 2019-09-17

## 2019-09-15 RX ORDER — FUROSEMIDE 10 MG/ML
80 INJECTION INTRAMUSCULAR; INTRAVENOUS EVERY 6 HOURS
Status: DISCONTINUED | OUTPATIENT
Start: 2019-09-15 | End: 2019-09-16

## 2019-09-15 RX ORDER — FUROSEMIDE 10 MG/ML
60 INJECTION INTRAMUSCULAR; INTRAVENOUS EVERY 6 HOURS
Status: DISCONTINUED | OUTPATIENT
Start: 2019-09-15 | End: 2019-09-15

## 2019-09-15 RX ORDER — POLYETHYLENE GLYCOL 3350 17 G/17G
17 POWDER, FOR SOLUTION ORAL 2 TIMES DAILY
Status: DISCONTINUED | OUTPATIENT
Start: 2019-09-15 | End: 2019-09-19

## 2019-09-15 RX ORDER — MULTIPLE VITAMINS W/ MINERALS TAB 9MG-400MCG
1 TAB ORAL DAILY
Status: DISCONTINUED | OUTPATIENT
Start: 2019-09-16 | End: 2019-10-07 | Stop reason: HOSPADM

## 2019-09-15 RX ORDER — AMOXICILLIN 250 MG
2 CAPSULE ORAL EVERY EVENING
Status: DISCONTINUED | OUTPATIENT
Start: 2019-09-15 | End: 2019-09-19

## 2019-09-15 RX ORDER — PIPERACILLIN SODIUM, TAZOBACTAM SODIUM 3; .375 G/15ML; G/15ML
3.38 INJECTION, POWDER, LYOPHILIZED, FOR SOLUTION INTRAVENOUS EVERY 6 HOURS
Status: DISCONTINUED | OUTPATIENT
Start: 2019-09-15 | End: 2019-09-16

## 2019-09-15 RX ADMIN — POTASSIUM CHLORIDE 20 MEQ: 29.8 INJECTION, SOLUTION INTRAVENOUS at 18:04

## 2019-09-15 RX ADMIN — POTASSIUM CHLORIDE 20 MEQ: 1.5 POWDER, FOR SOLUTION ORAL at 01:58

## 2019-09-15 RX ADMIN — PIPERACILLIN SODIUM AND TAZOBACTAM SODIUM 3.38 G: 3; .375 INJECTION, POWDER, LYOPHILIZED, FOR SOLUTION INTRAVENOUS at 19:22

## 2019-09-15 RX ADMIN — PIPERACILLIN SODIUM AND TAZOBACTAM SODIUM 3.38 G: 3; .375 INJECTION, POWDER, LYOPHILIZED, FOR SOLUTION INTRAVENOUS at 09:09

## 2019-09-15 RX ADMIN — HEPARIN SODIUM 49.75 UNITS/KG/HR: 10000 INJECTION, SOLUTION INTRAVENOUS at 08:41

## 2019-09-15 RX ADMIN — MULTIVITAMIN 15 ML: LIQUID ORAL at 07:45

## 2019-09-15 RX ADMIN — IPRATROPIUM BROMIDE AND ALBUTEROL SULFATE 3 ML: .5; 3 SOLUTION RESPIRATORY (INHALATION) at 08:33

## 2019-09-15 RX ADMIN — PANTOPRAZOLE SODIUM 40 MG: 40 INJECTION, POWDER, FOR SOLUTION INTRAVENOUS at 07:46

## 2019-09-15 RX ADMIN — MIDAZOLAM HYDROCHLORIDE 2 MG: 2 INJECTION, SOLUTION INTRAMUSCULAR; INTRAVENOUS at 11:55

## 2019-09-15 RX ADMIN — FUROSEMIDE 60 MG: 10 INJECTION, SOLUTION INTRAVENOUS at 13:03

## 2019-09-15 RX ADMIN — HEPARIN SODIUM (PORCINE) LOCK FLUSH IV SOLN 100 UNIT/ML 3000 UNITS: 100 SOLUTION at 04:04

## 2019-09-15 RX ADMIN — SENNOSIDES AND DOCUSATE SODIUM 2 TABLET: 8.6; 5 TABLET ORAL at 19:22

## 2019-09-15 RX ADMIN — DEXMEDETOMIDINE 1 MCG/KG/HR: 100 INJECTION, SOLUTION, CONCENTRATE INTRAVENOUS at 01:58

## 2019-09-15 RX ADMIN — IPRATROPIUM BROMIDE AND ALBUTEROL SULFATE 3 ML: .5; 3 SOLUTION RESPIRATORY (INHALATION) at 12:44

## 2019-09-15 RX ADMIN — POLYETHYLENE GLYCOL 3350 17 G: 17 POWDER, FOR SOLUTION ORAL at 19:22

## 2019-09-15 RX ADMIN — FUROSEMIDE 60 MG: 10 INJECTION, SOLUTION INTRAVENOUS at 07:45

## 2019-09-15 RX ADMIN — DEXMEDETOMIDINE 1 MCG/KG/HR: 100 INJECTION, SOLUTION, CONCENTRATE INTRAVENOUS at 13:16

## 2019-09-15 RX ADMIN — DOBUTAMINE 2.5 MCG/KG/MIN: 12.5 INJECTION, SOLUTION INTRAVENOUS at 17:49

## 2019-09-15 RX ADMIN — FENTANYL CITRATE 50 MCG: 50 INJECTION, SOLUTION INTRAMUSCULAR; INTRAVENOUS at 08:00

## 2019-09-15 RX ADMIN — PANTOPRAZOLE SODIUM 40 MG: 40 INJECTION, POWDER, FOR SOLUTION INTRAVENOUS at 19:22

## 2019-09-15 RX ADMIN — Medication 2 G: at 13:14

## 2019-09-15 RX ADMIN — DEXMEDETOMIDINE 1 MCG/KG/HR: 100 INJECTION, SOLUTION, CONCENTRATE INTRAVENOUS at 10:23

## 2019-09-15 RX ADMIN — DEXMEDETOMIDINE 1 MCG/KG/HR: 100 INJECTION, SOLUTION, CONCENTRATE INTRAVENOUS at 03:11

## 2019-09-15 RX ADMIN — POTASSIUM CHLORIDE 20 MEQ: 29.8 INJECTION, SOLUTION INTRAVENOUS at 12:15

## 2019-09-15 RX ADMIN — Medication 75 MCG/HR: at 19:58

## 2019-09-15 RX ADMIN — HEPARIN SODIUM (PORCINE) LOCK FLUSH IV SOLN 100 UNIT/ML 3000 UNITS: 100 SOLUTION at 14:23

## 2019-09-15 RX ADMIN — PIPERACILLIN SODIUM AND TAZOBACTAM SODIUM 3.38 G: 3; .375 INJECTION, POWDER, LYOPHILIZED, FOR SOLUTION INTRAVENOUS at 14:10

## 2019-09-15 RX ADMIN — DEXMEDETOMIDINE 1 MCG/KG/HR: 100 INJECTION, SOLUTION, CONCENTRATE INTRAVENOUS at 07:45

## 2019-09-15 RX ADMIN — IPRATROPIUM BROMIDE AND ALBUTEROL SULFATE 3 ML: .5; 3 SOLUTION RESPIRATORY (INHALATION) at 20:17

## 2019-09-15 RX ADMIN — FUROSEMIDE 80 MG: 10 INJECTION, SOLUTION INTRAVENOUS at 18:48

## 2019-09-15 RX ADMIN — HEPARIN SODIUM 2950 UNITS/HR: 10000 INJECTION, SOLUTION INTRAVENOUS at 01:42

## 2019-09-15 RX ADMIN — MIDAZOLAM 6 MG/HR: 5 INJECTION INTRAMUSCULAR; INTRAVENOUS at 13:29

## 2019-09-15 RX ADMIN — DEXMEDETOMIDINE 1 MCG/KG/HR: 100 INJECTION, SOLUTION, CONCENTRATE INTRAVENOUS at 21:04

## 2019-09-15 RX ADMIN — IPRATROPIUM BROMIDE AND ALBUTEROL SULFATE 3 ML: .5; 3 SOLUTION RESPIRATORY (INHALATION) at 16:02

## 2019-09-15 RX ADMIN — DEXMEDETOMIDINE 1 MCG/KG/HR: 100 INJECTION, SOLUTION, CONCENTRATE INTRAVENOUS at 17:49

## 2019-09-15 RX ADMIN — FENTANYL CITRATE 50 MCG: 50 INJECTION, SOLUTION INTRAMUSCULAR; INTRAVENOUS at 18:56

## 2019-09-15 RX ADMIN — THIAMINE HCL TAB 100 MG 100 MG: 100 TAB at 07:45

## 2019-09-15 RX ADMIN — DEXMEDETOMIDINE 1 MCG/KG/HR: 100 INJECTION, SOLUTION, CONCENTRATE INTRAVENOUS at 15:49

## 2019-09-15 RX ADMIN — DEXMEDETOMIDINE 1 MCG/KG/HR: 100 INJECTION, SOLUTION, CONCENTRATE INTRAVENOUS at 05:42

## 2019-09-15 ASSESSMENT — ACTIVITIES OF DAILY LIVING (ADL)
ADLS_ACUITY_SCORE: 19

## 2019-09-15 ASSESSMENT — MIFFLIN-ST. JEOR: SCORE: 2398.5

## 2019-09-15 NOTE — PROGRESS NOTES
I was called to patient's room because there was concern of clot in her oxygenator. Patient's delta P was increasing to around 100. Patient was hemodynamically stable and there was no visible clot in the oxygenator. Her pO2 appropriately  increased on her arterial ELS sample. Therefore, we elected to continue current management.     However, later in the night, patient's delta P continued to increased (above 120) despite therapeutic ACT. Therefore, decision was made to replace the circuit and oxygenator. This was done without any complications.     Justice Beltran   Cardiology Fellow   Pager: [phone]

## 2019-09-15 NOTE — PROGRESS NOTES
ECMO Shift Summary:    Patient remains on VA ECMO, all equipment is functioning and alarms are appropriately set. RPM's 300 with flow range 2.4-3.4 L/min. Sweep gas is at 0.5 LPM and FiO2 50%. Circuit remains free of air, clot and fibrin. Cannulas are secure with no bleeding from site. Extremities are warm. Suctioned ETT for thick blood tinged secretions early in the day, changing to pink frothy later in the day.    Significant Shift Events: Left lung christen out on morning CXR. Bronch done by pulmonary did not find any mucus plugs, suggestive of pulmonary edema. Flow weaned per MD to 3L. Patient was transported to chest CT, results pending. Increasing delta P with decreasing venous flow noted throughout the day, perfusion and CSI service notified. Post oxy labs WNL.    Vent settings:  Ventilation Mode: CMV/AC  (Continuous Mandatory Ventilation/ Assist Control)  FiO2 (%): 50 %  Rate Set (breaths/minute): 12 breaths/min  Tidal Volume Set (mL): 450 mL  PEEP (cm H2O): 6 cmH2O  Oxygen Concentration (%): 40 %  Resp: 21  .    Heparin is running at 2950 u/hr, ACT range 165-171.    Urine output is good, blood loss was none. Product given included 750 albumin.      Intake/Output Summary (Last 24 hours) at 2019 1917  Last data filed at 2019 1900  Gross per 24 hour   Intake 7709.73 ml   Output 4305 ml   Net 3404.73 ml       ECHO:  No results found for this or any previous visit.  Results for orders placed in visit on 18   Echocardiogram Complete    Narrative [id number]  [id number]  [id number]  [id number]^[place]^VINOD^[facility]  Diagnostic and [facility]reamtent-3rd Floor  [address] [place] [id number]     Name: XAVIER ROWELL  MRN: [id number]  : 1960  Study Date: 2018 05:59 PM  Age: 58 yrs  Gender: Female  [place] [facility] [place]  Reason For Study: Cough, Edema extremities  Ordering Physician: VINOD SHULTZ  Referring Physician:  VINOD SHULTZ  Performed By: Ryley Ridley RDCS     BSA: 2.7 m2  Height: 66 in  Weight: 418 lb  HR: 85  BP: 148/77 mmHg  _____________________________________________________________________________  __        Procedure  Echocardiogram with two-dimensional, color and spectral Doppler performed.  _____________________________________________________________________________  __        Interpretation Summary  Moderate concentric wall thickening consistent with left ventricular  hypertrophy is present.  Biventricular size and systolic function is normal.The LVEF is 55-60%.  No significant valve disease.  Inferior vena cava appers dilated measuring 2.4 cm  _____________________________________________________________________________  __        Left Ventricle  Left ventricular size is normal. Left ventricular function is normal.The EF is  55-60%. Moderate concentric wall thickening consistent with left ventricular  hypertrophy is present. Left ventricular diastolic function is indeterminate.  Regional wall motion is probably normal. Endocardial border definition  suboptimal and contrast not given.     Right Ventricle  The right ventricle is normal size. Global right ventricular function is  normal.     Atria  Both atria appear normal.     Mitral Valve  Mild mitral annular calcification is present. Trace mitral insufficiency is  present.        Aortic Valve  Mild aortic valve sclerosis is present. No aortic regurgitation is present.  Transvalvular Doppler is normal and without signs of signifcant stenosis.     Tricuspid Valve  The peak velocity of the tricuspid regurgitant jet is not obtainable.  Pulmonary artery systolic pressure cannot be assessed.     Pulmonic Valve  The pulmonic valve cannot be assessed.     Vessels  The aorta root is normal. Inferior vena cava appers dilated measuring 2.4 cm.  Ascending aorta 3.3 cm.     Pericardium  No pericardial effusion is present.      _____________________________________________________________________________  __  MMode/2D Measurements & Calculations  IVSd: 1.5 cm  LVIDd: 5.4 cm  LVIDs: 3.7 cm  LVPWd: 1.3 cm  FS: 30.5 %  LV mass(C)d: 323.5 grams  LV mass(C)dI: 118.2 grams/m2     asc Aorta Diam: 3.3 cm  LVOT diam: 2.2 cm  LVOT area: 4.0 cm2  LA Volume (BP): 71.6 ml  LA Volume Index (BP): 26.1 ml/m2  RWT: 0.47        Doppler Measurements & Calculations  MV E max emile: 67.4 cm/sec  MV A max emile: 88.8 cm/sec  MV E/A: 0.76  MV dec time: 0.17 sec  Ao V2 max: 203.2 cm/sec  Ao max P.0 mmHg  MERON(V,D): 2.3 cm2     LV V1 max P.5 mmHg  LV V1 max: 116.8 cm/sec  AV Emile Ratio (DI): 0.57  E/E' av.0  Lateral E/e': 13.4  Medial E/e': 12.6     _____________________________________________________________________________  __           Report approved by: Tariq Pappas 2018 10:50 AM          CXR:  Recent Results (from the past 24 hour(s))   XR Chest Port 1 View   Result Value    Radiologist flags Increased attenuation of the left hemithorax (Urgent)    Narrative    EXAM: XR CHEST PORT 1 VW  2019 3:05 AM      HISTORY: Check endotracheal tube placement and ECLS cannula placement.  DO NOT log-roll patient.  Place film under patient using patient  safety handling process.    COMPARISON: Radiographs 2019    FINDINGS: Supine radiograph of the chest. Enteric tube courses beyond  the field-of-view. Endotracheal tube tip projects over the midthoracic  trachea. Unchanged inferior approach cannulation. Right IJ central  line tip projects over the superior cavoatrial junction.    The trachea is midline. The cardiac silhouette is obscured. Increased  diffuse attenuation of the left hemithorax, new since 913 0416 hours  exam and similar to 913 0127 hours exam. Increased right basilar and  perihilar opacities. Upper abdomen is unremarkable.       Impression    IMPRESSION:   1. Increased diffuse attenuation of the left hemithorax, similar  to  yesterday's early-morning radiograph, again suggesting mucous  plugging. Consider suction and repeating radiograph.  2. Increased right perihilar and basilar opacities, suggesting  worsening pulmonary edema and/or atelectasis.    [Urgent Result: Increased attenuation of the left hemithorax  suggesting mucous plugging.]    Finding was identified on 9/14/2019 4:54 AM.     Station 4E RN Kayla was contacted by Dr. Oden at 9/14/2019 4:56  AM and verbalized understanding of the urgent finding and will suction  and repeat radiograph.     I have personally reviewed the examination and initial interpretation  and I agree with the findings.    LAURA ESCOBEDO MD   XR Chest Port 1 View    Narrative    EXAM: XR CHEST PORT 1 VW  9/14/2019 7:01 AM      HISTORY: 59 yof w/ b/l PE and staph pna recently on VA ECMO. Recent  imaging c/w mucus plugging, now s/p suctioning. Eval for interval  improvement    COMPARISON: 9/14/2019 0234 hours    FINDINGS: Semiupright AP radiograph of the chest. Endotracheal tube  tip projects over the midthoracic trachea. Enteric tubes course beyond  the field-of-view. Right IJ central line tip projects over the  superior cavoatrial junction.    The trachea is midline. The cardiac silhouette is partially obscured.  Improved aeration in the left upper lobe. The remaining significant  attenuation in the left hemithorax. Unchanged patchy right perihilar  and basilar opacities. Upper abdomen is unremarkable.       Impression    IMPRESSION:   1. Mildly improved aeration in the left upper lobe, however there  remains significant attenuation in the left hemithorax, suggesting  segmental atelectasis/consolidation with left pleural effusion,  increased from 9/13/2019.  2. Unchanged patchy right perihilar and basilar opacities suggesting  atelectasis and/or pulmonary edema.    I have personally reviewed the examination and initial interpretation  and I agree with the findings.    LAURA ESCOBEDO MD        Labs:  Recent Labs   Lab 09/14/19  1557 09/14/19  1359 09/14/19  1213 09/14/19  0952   PH 7.42 7.43 7.43 7.43   PCO2 42 41 42 42   PO2 64* 61* 73* 65*   HCO3 27 27 28 28   O2PER 40.0 40 40 40       Lab Results   Component Value Date    HGB 8.4 (L) 09/14/2019    PHGB <30 09/14/2019    PLT 63 (L) 09/14/2019    FIBR 755 (H) 09/14/2019    INR 1.20 (H) 09/14/2019    PTT 81 (H) 09/14/2019    DD 13.1 (H) 09/14/2019    AXA 0.38 09/14/2019    ANTCH 57 (L) 09/14/2019         Plan is to continue ECMO support.      Deja Ash, RT, RRT  9/14/2019 7:17 PM

## 2019-09-15 NOTE — PLAN OF CARE
ECMO [place] STUDY    Inotropes/Pressors:2.5 dobutamine    Flow  BP HR O2 Sat  MVO2    3LPM 121/56 74 97  68     2LPM  124/59 79 98  74     Results for XAVIER ROWELL (MRN [id number]) as of 9/15/2019 13:14   Ref. Range 9/15/2019 10:06 9/15/2019 10:24 9/15/2019 11:59   pH Arterial Latest Ref Range: 7.35 - 7.45 pH 7.41  7.40   pCO2 Arterial Latest Ref Range: 35 - 45 mm Hg 43  44   PO2 Arterial Latest Ref Range: 80 - 105 mm Hg 85  80   Bicarbonate Arterial Latest Ref Range: 21 - 28 mmol/L 27  27   Base Excess Art Latest Units: mmol/L 2.0  2.3   FIO2 Unknown 50  50   Oxyhemoglobin Arterial Latest Ref Range: 92 - 100 % 95  95                     Elvira Chilel MD  Cardiology fellow  September 15, 2019

## 2019-09-15 NOTE — PROGRESS NOTES
Cardiology Critical Care Note - Cardiology  Raffaele Stuart M.D.  The patient remains unstable in the ICU with on-going need for ventilator support, parenteral medications for the adjustment of blood pressure and cardiac output and maintenance of renal function.      The patient is seen for prolonged ventilator management requiring oxygen and/or pressure modulation; shock requiring vasopressor and or inotropic agents; low cardiac output necessitating inotropic agents, vasopressors and afterload reducing agents; VA ECMO;  RIGHT ventricular failure Pneumonia with staph requiring antibiotic selection and monitoring with left lung white out with left pleural effusion    I personally reviewed:  Arterial and venous blood gases to assess acid base balance, oxygenation, and ventilator settings.    .  Ventilatory settings and/or supplement oxygen needs in order to obtain optimal oxygenation and electrolyte balance at low possible pressure support and inspired oxygen tension      ECMO Attending Progress Note  9/15/2019    59 year old female with a history of hypertension, hyperlipidemia, sleep apnea, morbid obesity, and chronic bronchitis who presents to the emergency department via EMS for evaluation of respiratory distress s/p intubation and PEA arrest with CTA consistent with bilateral PE transferred here for VA ECMO thrombectomy and catheter directed lytics on 9/9.  Interval events: improving RV function still critically ill on low dose pressors    Physical Exam:  Temp:  [96.4  F (35.8  C)-98.8  F (37.1  C)] 98.2  F (36.8  C)  Heart Rate:  [] 72  Resp:  [15-23] 17  BP: (103-130)/(60-68) 130/68  MAP:  [66 mmHg-105 mmHg] 74 mmHg  Arterial Line BP: (105-179)/(47-73) 117/54  FiO2 (%):  [40 %-50 %] 50 %  SpO2:  [92 %-100 %] 99 %    Intake/Output Summary (Last 24 hours) at 9/13/2019 1010  Last data filed at 9/12/2019 1000  Gross per 24 hour   Intake 5757.87 ml   Output 5355 ml   Net 402.87 ml    Ventilation Mode:  CMV/AC  (Continuous Mandatory Ventilation/ Assist Control)  FiO2 (%): 50 %  Rate Set (breaths/minute): 12 breaths/min  Tidal Volume Set (mL): 450 mL  PEEP (cm H2O): 8 cmH2O  Oxygen Concentration (%): 50 %  Resp: 17     General: no acute distress, intubated and sedated  HEENT: PERRLA, conjunctiva clear, without icterus or pallor, oropharyx clear  Cardiac: RRR nl S1S2   Lungs: clear to auscultation and percussion bilaterally anterior  Abdomen: soft, nontender, non distended, no hepatosplenomegaly or masses  Extremities: without edema or cyanosis; pulses are palpable;   Skin: normal skin appearance without worrisome lesions.   Neurologic:intubated and sedated,     Labs:  Recent Labs   Lab 09/15/19  1006 09/15/19  0811 09/15/19  0613 09/15/19  0350   PH 7.41 7.38 7.38 7.39   PCO2 43 42 40 44   PO2 85 88 95 89   HCO3 27 25 24 27   O2PER 50 50 50 50      Recent Labs   Lab 09/15/19  1024 09/15/19  0350 09/14/19  2211 09/14/19  1557   WBC 14.4* 13.0* 12.9* 12.9*   HGB 8.8* 8.0* 8.1* 8.4*     Creatinine   Date Value Ref Range Status   09/15/2019 0.85 0.52 - 1.04 mg/dL Final   09/15/2019 0.85 0.52 - 1.04 mg/dL Final   09/14/2019 0.85 0.52 - 1.04 mg/dL Final   09/14/2019 0.89 0.52 - 1.04 mg/dL Final       ECMO Issues including assessments and plan on DOS 9/15/2019:  Neuro: Sedated for mechanical ventilation and ECMO.  NIRS stable  b/l  RASS goal: 0-1  CV: Cardiogenic shock.  Hemodynamically stable on norepi  Pulm: Flows unchanged at 4, Sweep unchanged at 3, Keep vent settings at rest settings:as above  FEN/Renal: Electrolytes stable w/ replacement protocols in place, Cr stable, UOP stable  Heme: ACT goal: 200, Hemoglobin stable .  Goals: if O2 sat >85% Hgb >8.  If O2 Sat <85% keep Hgb 10-12.  Minimal oozing around the ECMO cannulas.  ID: Receiving empiric antibiotics  Cannulae: Position is acceptable on exam and the available imaging.  Distal perfusion cannula is in place and patent.     I have personally reviewed the ECMO  flows, oxygenation and CO2 clearance, anticoagulation, and cannula position.  I have also personally assessed the patient's systemic response with hemodynamics, oxygenation, ventilation, and bleeding.         September 15, 2019    Dr Narciso DRUMMOND

## 2019-09-15 NOTE — PLAN OF CARE
Neuro- Unchanged; responds to voice. Localizes pain. PERRL. Fentanyl for pain, dex and midazolam for sedation.  CV- SR 70s. MAPs>65. Dobutamine 2.5mcg/kg/min. ECMO flows 2.4-2.6 at start of shift, 3200 RPMs, sweep 0.5. Per ECMO specialist, pressure change values were increasing and occasionally alarming. Notified CSI and perfusion. Decision to wait on pump exchange until clot was seen, pressure change reached 120, or flows dropped to <2. Pt maintained until approx. 0200, at which time pressure change reached 120. RPMs turned down to ~3000 to control pressure change; flows ~1.9 with same changes. CSI notified and perfusion paged. Circuit exchanged per perfusion at approx. 0230. Pt tolerated well hemodynamically.  2 units PRBCs and 1 unit platelets given.   Pulm-Remains on CMV settings. Suctioning moderate-large amounts of thick, frothy secretions. Monitoring ABGs Q2 hours, as ordered.   GI- Hypoactive BS.   - Adequate UOP this shift.  Skin- Repositioned as tolerated after ECMO circuit exchange.  See flow sheets for further interventions and assessments. Continuing to monitor.      Problem: Pulmonary Impairment (Pulmonary Impairment)  Goal: Pulmonary Impairment Goal: Optimal Level of Pulmonary Function  9/15/2019 0511 by Kayla Butcher RN  Outcome: No Change     Problem: Adjustment to Therapy (Extracorporeal Life Support/ECMO)  Goal: Optimal Adjustment to Therapy  9/15/2019 0511 by Kayla Butcher, RN  Outcome: No Change     Problem: Bleeding (Extracorporeal Life Support/ECMO)  Goal: Absence of Bleeding  9/15/2019 0511 by Kayla Butcher, RN  Outcome: No Change     Problem: Cardiac Output Decreased (Extracorporeal Life Support/ECMO)  Goal: Effective Cardiac Output  9/15/2019 0511 by Kayla Butcher, RN  Outcome: No Change     Problem: Device-Related Complication Risk (Extracorporeal Life Support/ECMO)  Goal: Optimal Device Function  9/15/2019 0511 by Kayla Butcher, RN  Outcome: No Change     Problem: Infection (Extracorporeal Life  Support/ECMO)  Goal: Absence of Infection Signs/Symptoms  9/15/2019 0511 by Kayla Butcher RN  Outcome: No Change     Problem: Communication Impairment (Artificial Airway)  Goal: Effective Communication  9/15/2019 0511 by Kayla Butcher RN  Outcome: No Change     Problem: Device-Related Complication Risk (Artificial Airway)  Goal: Optimal Device Function  9/15/2019 0511 by Kayla Butcher RN  Outcome: No Change     Problem: Chronic Respiratory Difficulty Comorbidity  Goal: Chronic Respiratory Difficulty  Description  Patient comorbidity will be monitored for signs and symptoms of Respiratory Difficulty (Chronic) condition.  Problems will be absent, minimized or managed by discharge/transition of care.  9/15/2019 0511 by Kayla Butcher RN  Outcome: No Change     Problem: Hypertension Comorbidity  Goal: Blood Pressure in Desired Range  9/15/2019 0511 by Kayla Butcher RN  Outcome: No Change     Problem: Obstructive Sleep Apnea Risk or Actual (Comorbidity Management)  Goal: Unobstructed Breathing During Sleep  9/15/2019 0511 by Kayla Butcher RN  Outcome: No Change     Problem: Adult Inpatient Plan of Care  Goal: Plan of Care Review  9/15/2019 0511 by Kayla Butcher RN  Outcome: No Change     Problem: Adult Inpatient Plan of Care  Goal: Patient-Specific Goal (Individualization)  9/15/2019 0511 by Kayla Butcher RN  Outcome: No Change     Problem: Adult Inpatient Plan of Care  Goal: Absence of Hospital-Acquired Illness or Injury  9/15/2019 0511 by Kayla Butcher RN  Outcome: No Change     Problem: Adult Inpatient Plan of Care  Goal: Optimal Comfort and Wellbeing  9/15/2019 0511 by Kayla Butcher RN  Outcome: No Change     Problem: Adult Inpatient Plan of Care  Goal: Readiness for Transition of Care  9/15/2019 0511 by Kayla Butcher RN  Outcome: No Change     Problem: Adult Inpatient Plan of Care  Goal: Rounds/Family Conference  9/15/2019 0511 by Kayla Butcher RN  Outcome: No Change

## 2019-09-15 NOTE — PLAN OF CARE
S:  Lasix 60mg x2. 6.5L UO, net negative 1L since MN.  Free H20 reduced to 75cc/hour from 125. Dobutamine max concentrated.  Pt wakes is purposeful but does not follow commands.  Respiratory secretions continue to be pink/red and frothy.  OG output dark red.  KCL 20 meq given x2. 1 gm Mg.  SR rare PAC's.  LS coarse. No blood products given this shift.  B:  Pt s/p saddle PE with VA ECMO.  A:  Pt tolerating diuresis. No changes to ECMO or Vent.  Pt remains hemodynamically stable on Dobutamine. Tmax 37.5.    P:  To OR for decannulation in am if pt remains stable overnight.

## 2019-09-15 NOTE — PROGRESS NOTES
ECMO Shift Summary:    Patient remains on [facility] ECMO, all equipment is functioning and alarms are appropriately set. RPM's 9576-0937 with flow range 1.89-3.38 L/min. Sweep gas is at 0.5 LPM and FiO2 50%. Circuit remains free of air, clot and fibrin. Cannulas are secure with no bleeding from site. Extremities are warm to touch. Suctioned ETT frothy blood tinged secretion. .    Significant Shift Events: Delta P pressure were high, CSI and perfusionist notified. Decision was to wait until delta pressure reaches 120   Provided  there was non clots in the circuits. Delta P alarmed to 120 and RPMs were lowered to 3000 with flow of 1.89. Patient did not lose blood pressure and sats. Perfusionist was called to change the circuit. The circuit and oxygenator were changed at 0245 am.      Patient tolerate the procedure. New oxygenator lot # [id number]     Vent settings:  Ventilation Mode: CMV/AC  (Continuous Mandatory Ventilation/ Assist Control)  FiO2 (%): 50 %  Rate Set (breaths/minute): 12 breaths/min  Tidal Volume Set (mL): 450 mL  PEEP (cm H2O): 6 cmH2O  Oxygen Concentration (%): 40 %  Resp: 19  .    Heparin is running at 2950 u//hr, ACT range 171-191. Heparin was bolus two time 3000 Units each time     Urine output is as RN charted blood loss was none. Product given included 2 units of RBCs and 1 units Plts .      Intake/Output Summary (Last 24 hours) at 9/15/2019 0632  Last data filed at 9/15/2019 0600  Gross per 24 hour   Intake 8701.83 ml   Output 4435 ml   Net 4266.83 ml       ECHO:  No results found for this or any previous visit.  Results for orders placed in visit on 18   Echocardiogram Complete    Narrative [id number]  [id number]  [id number]  [id number]^[place]^VINOD^[facility]  Diagnostic and Treamtent-3rd Floor  [address] [place] [address]     Name: XAVIER ROWELL  MRN: [id number]  : 1960  Study Date: 2018 05:59 PM  Age: 58  yrs  Gender: Female  [place] [facility] [place] For Study: Cough, Edema extremities  Ordering Physician: VINOD SHULTZ  Referring Physician: VINOD SHULTZ WILLIS  Performed By: Ryley Ridley RDCS     BSA: 2.7 m2  Height: 66 in  Weight: 418 lb  HR: 85  BP: 148/77 mmHg  _____________________________________________________________________________  __        Procedure  Echocardiogram with two-dimensional, color and spectral Doppler performed.  _____________________________________________________________________________  __        Interpretation Summary  Moderate concentric wall thickening consistent with left ventricular  hypertrophy is present.  Biventricular size and systolic function is normal.The LVEF is 55-60%.  No significant valve disease.  Inferior vena cava appers dilated measuring 2.4 cm  _____________________________________________________________________________  __        Left Ventricle  Left ventricular size is normal. Left ventricular function is normal.The EF is  55-60%. Moderate concentric wall thickening consistent with left ventricular  hypertrophy is present. Left ventricular diastolic function is indeterminate.  Regional wall motion is probably normal. Endocardial border definition  suboptimal and contrast not given.     Right Ventricle  The right ventricle is normal size. Global right ventricular function is  normal.     Atria  Both atria appear normal.     Mitral Valve  Mild mitral annular calcification is present. Trace mitral insufficiency is  present.        Aortic Valve  Mild aortic valve sclerosis is present. No aortic regurgitation is present.  Transvalvular Doppler is normal and without signs of signifcant stenosis.     Tricuspid Valve  The peak velocity of the tricuspid regurgitant jet is not obtainable.  Pulmonary artery systolic pressure cannot be assessed.     Pulmonic Valve  The pulmonic valve cannot be assessed.     Vessels  The aorta root is normal. Inferior vena cava  appers dilated measuring 2.4 cm.  Ascending aorta 3.3 cm.     Pericardium  No pericardial effusion is present.     _____________________________________________________________________________  __  MMode/2D Measurements & Calculations  IVSd: 1.5 cm  LVIDd: 5.4 cm  LVIDs: 3.7 cm  LVPWd: 1.3 cm  FS: 30.5 %  LV mass(C)d: 323.5 grams  LV mass(C)dI: 118.2 grams/m2     asc Aorta Diam: 3.3 cm  LVOT diam: 2.2 cm  LVOT area: 4.0 cm2  LA Volume (BP): 71.6 ml  LA Volume Index (BP): 26.1 ml/m2  RWT: 0.47        Doppler Measurements & Calculations  MV E max emile: 67.4 cm/sec  MV A max emile: 88.8 cm/sec  MV E/A: 0.76  MV dec time: 0.17 sec  Ao V2 max: 203.2 cm/sec  Ao max P.0 mmHg  MERON(V,D): 2.3 cm2     LV V1 max P.5 mmHg  LV V1 max: 116.8 cm/sec  AV Emile Ratio (DI): 0.57  E/E' av.0  Lateral E/e': 13.4  Medial E/e': 12.6     _____________________________________________________________________________  __           Report approved by: Tariq Pappas 2018 10:50 AM          CXR:  Recent Results (from the past 24 hour(s))   XR Chest Port 1 View    Narrative    EXAM: XR CHEST PORT 1 VW  2019 7:01 AM      HISTORY: 59 yof w/ b/l PE and staph pna recently on [facility] ECMO. Recent  imaging c/w mucus plugging, now s/p suctioning. Eval for interval  improvement    COMPARISON: 2019 0234 hours    FINDINGS: Semiupright AP radiograph of the chest. Endotracheal tube  tip projects over the midthoracic trachea. Enteric tubes course beyond  the field-of-view. Right IJ central line tip projects over the  superior cavoatrial junction.    The trachea is midline. The cardiac silhouette is partially obscured.  Improved aeration in the left upper lobe. The remaining significant  attenuation in the left hemithorax. Unchanged patchy right perihilar  and basilar opacities. Upper abdomen is unremarkable.       Impression    IMPRESSION:   1. Mildly improved aeration in the left upper lobe, however there  remains significant  attenuation in the left hemithorax, suggesting  segmental atelectasis/consolidation with left pleural effusion,  increased from 9/13/2019.  2. Unchanged patchy right perihilar and basilar opacities suggesting  atelectasis and/or pulmonary edema.    I have personally reviewed the examination and initial interpretation  and I agree with the findings.    LAURA ESCOBEDO MD   CT Chest w/o Contrast    Narrative    CT CHEST W/O CONTRAST 9/14/2019 6:11 PM    Comparison: CT cap 9/10/2019, CT chest 9/9/2018 , CT abdomen pelvis  4/12/2017, 7/9/2009.    History: Pleural effusion    Technique: CT of the chest obtained without intravenous contrast.  Axial, coronal, and sagittal reconstructions were obtained and  reviewed.    Findings:    Chest: The heart size is enlarged. No significant pericardial  effusion. Right IJ central venous catheter tip terminates in the low  SVC. Inferior approach ECMO cannula tip terminates in the low right  atrium. Mild calcification of the aortic and mitral annulus. No  enlarged mediastinal lymph nodes.    Endotracheal tube tip terminates above the iain. Bowing of the  posterior membrane of the trachea and narrowing of both main stem  bronchi. Extensive groundglass and patchy consolidative opacities in  the right lung, with patchy groundglass and depending consolidation in  the left lung. Small bilateral pleural effusions, not significantly  changed from the prior CT on the left, and increased on the right.    Upper abdomen: Evaluation of the upper abdomen is limited. Enteric  tube is looped in the stomach and continues below the inferior field  of view of the scan. Thickening of the adrenal glands, with an  unchanged right adrenal adenoma (previously characterized 7/9/2009).    Bones: Unchanged bilateral fifth and sixth anterolateral rib  fractures. There is also a nondisplaced sternal fracture, with an  enlarged overlying hematoma measuring up to 3.5 x 3.3 cm.      Impression    Impression:   1.  Unchanged small left pleural effusion, and increased small right  pleural effusion.  2. Increased patchy groundglass and consolidative opacities in the  lungs, which may represent pulmonary infarction, pulmonary edema,  infection, or hemorrhage.  3. Narrowing of the distal trachea and mainstem bronchi, which is at  least in part due to acquisition during expiration. However, there  appears to also be a component of tracheobronchomalacia.  4. Unchanged bilateral rib fractures. There is also a nondisplaced  fracture of the mid sternal body, with an increased overlying  subcutaneous hematoma.    I have personally reviewed the examination and initial interpretation  and I agree with the findings.    FLOR MCCRAY MD       Labs:  Recent Labs   Lab 09/15/19  0613 09/15/19  0350 09/15/19  0155 09/14/19  2350   PH 7.38 7.39 7.40 7.40   PCO2 40 44 42 43   PO2 95 89 88 87   HCO3 24 27 26 26   O2PER 50 50 50 50       Lab Results   Component Value Date    HGB 8.0 (L) 09/15/2019    PHGB <30 09/15/2019    PLT 65 (L) 09/15/2019    FIBR 733 (H) 09/15/2019    INR 1.21 (H) 09/15/2019    PTT 97 (H) 09/15/2019    DD 16.5 (H) 09/15/2019    AXA 0.33 09/15/2019    ANTCH 57 (L) 09/14/2019         Plan is to support with [facility] ECMO.      Trent Ayala RT, RRT  9/15/2019 6:32 AM

## 2019-09-15 NOTE — PROGRESS NOTES
Cardiology Progress Note    Assessment & Plan      59 year old female with a history of hypertension, hyperlipidemia, sleep apnea, morbid obesity, and chronic bronchitis who presents to the emergency department via EMS for evaluation of respiratory distress s/p intubation and PEA arrest with CTA consistent with bilateral PE transferred here for [facility] ECMO thrombectomy and catheter directed lytics on 9/9.    Plan for today  - touch base with IR re tap  - turn down with gases with plan for decannulation   - continue zosyn for aspiration pneumonia   - minimize sedation as able     Neurology: Intubated, sedated  --continue versed, fentanyl propofol in favor of fentanyl   - initial CTH no acute changes, moving when sedation is weaned down    Cardiovascular / Hemodynamics: PEA arrest secondary to massive PE.   CTA-PE study demonstrates bilateral distal main and proximal lobar pulmonary emboli, greater on the right with elevated RV/LV ratio. Peripheral V-A ECMO inserted for hemodynamic support for massive PE. Went to cath lab and then to IR for suction mechanical thrombectomy.  LA 8.6   TTE: dilated RV, severely reduced.   EKG: snus tachy  --ACT goal 180-200  --hold lipitor for now given likely hepatic injury during arrest  --hold ACE/ARB for now given likely reduced renal fxn after arrest  --holding beta blocker given shock   - on dobutamine at 0.5   Pulmonary: PEA arrest secondary to massive PE. See above.   Ventilation Mode: CMV/AC  (Continuous Mandatory Ventilation/ Assist Control)  FiO2 (%): 50 %  Rate Set (breaths/minute): 12 breaths/min  Tidal Volume Set (mL): 450 mL  PEEP (cm H2O): 6 cmH2O  Oxygen Concentration (%): 40 %  Resp: 18  ETT in appropriate place.   -s/p thrombectomy and catheter directed lytics 9/9-9/10  Chest CTSubpleural groundglass attenuation in the right lung, possibly  pulmonary infarcts given previously seen extensive pulmonary emboli.   Patchy nodularity in the right base suggesting aspiration.  -  Now weaning vent requirements. CXR showing white out of left lung- bronched without mucous plugs, pulmonary edema. CT chest showed L pleural effusion increased R pleural effusion and some concern for tracheomalacia?  CXR: Lines in stable position.   --wean vent as able  --daily CXR  --- scheduled duonebs    GI and Nutrition: No known medical hx.    --monitor BID LFTs  - nutrition consult   --GI Prophylaxis: PPI    Renal, Fluid and Electrolytes: Cr at baseline  UOP continue to monitor.  - volume overloaded will increase diuresis with 60 lasix TID and redose if required   --monitor urine output  --maintain K>3 and Mg>2    Infectious Disease: Aspiration pneumonia -- sputum culture staph aureus sensitive to zosyn   --vancomycin/zosyn x5 days for ECMO, vanc last dose today, will continue zosyn for 10 days  --daily blood cultures  --monitor for signs of infection given cooling, lines, and leukocytosis     Hematology and Oncology: Receiving heparin for ECMO and  PE.   --cryo PRN fibrinogen < 200; FFP for INR >2  --Transfuse for Hgb<10  --heparin gtt for ECMO with ACT goal 160-180  --US LE w/ arterial duplex per ECMO protocol   --DVT PPX: Heparin as above  - course complicated by DIC with fibrinogen of 82 on 9/10 got 5 unit(s) of cryo   Endocrinology: No known medical history. BG elevated.  --insulin gtt   Lines: R femoral arterial and venous ECMO cannulae September 9, 2019  L femoral arterial line September 9, 2019  R radial arterial line September 9, 2019  ETT September 9, 2019  Underwood catheter September 9, 2019  OG tube September 9, 2019  Restraint: needed    Current lines are required for patient management       Family update by me today: Yes      Code Status:     The pt was discussed and evaluated with Dr. Mendez, Cardiologist, attending physician, who agrees with the assessment and plan above.     Elvira Chilel,     Interval History   Bronch yesterday without mucous plugs but suggestive of pulmonary  "edema, CT with b/l pleural effusions  Increased delta P in oxygenator required circuit change      Ventilation Mode: CMV/AC  (Continuous Mandatory Ventilation/ Assist Control)  FiO2 (%): 50 %  Rate Set (breaths/minute): 12 breaths/min  Tidal Volume Set (mL): 450 mL  PEEP (cm H2O): 6 cmH2O  Oxygen Concentration (%): 40 %  Resp: 18        Physical Exam   Temp: 97.3  F (36.3  C) Temp src: Esophageal BP: 130/68   Heart Rate: 65 Resp: 18 SpO2: 100 % O2 Device: Mechanical Ventilator    Vitals:    09/13/19 0400 09/14/19 0600 09/15/19 0400   Weight: (!) 184 kg (405 lb 10.3 oz) (!) 186 kg (410 lb 0.9 oz) (!) 180.2 kg (397 lb 4.3 oz)     Vital Signs with Ranges  Temp:  [96.6  F (35.9  C)-98.8  F (37.1  C)] 97.3  F (36.3  C)  Heart Rate:  [] 65  Resp:  [15-23] 18  BP: (103-130)/(60-68) 130/68  MAP:  [66 mmHg-105 mmHg] 88 mmHg  Arterial Line BP: (107-179)/(47-73) 137/68  FiO2 (%):  [40 %-50 %] 50 %  SpO2:  [92 %-100 %] 100 %  I/O last 3 completed shifts:  In: 8701.83 [I.V.:2771.83; NG/GT:2785]  Out: 4435 [Urine:3085; Emesis/NG output:1350]    Heart Rate: 65, Blood pressure 130/68, pulse 86, temperature 97.3  F (36.3  C), resp. rate 18, height 1.676 m (5' 5.98\"), weight (!) 180.2 kg (397 lb 4.3 oz), SpO2 100 %, not currently breastfeeding.  397 lbs 4.3 oz  GEN:  Morbidly obese sedated and intubated  CV:  Distant heart sounds   LUNGS:  Decreased breath sounds  ABD:  Active bowel sounds, soft, non-tender/non-distended.  No rebound/guarding/rigidity.  EXT:  No edema or cyanosis.  ECMO canula in place with only scant oozing   Underwood in place    Medications     dexmedetomidine 1 mcg/kg/hr (09/15/19 0700)     IV fluid REPLACEMENT ONLY       IV fluid REPLACEMENT ONLY       DOBUTamine 2.5 mcg/kg/min (09/15/19 0700)     fentaNYL 75 mcg/hr (09/15/19 0700)     heparin 2 unit/mL in 0.9% NaCl 3 mL/hr (09/15/19 0700)     HEParin 2,950 Units/hr (09/15/19 0700)     insulin (regular) Stopped (09/14/19 0800)     midazolam 4 mg/hr (09/15/19 " 0700)     - MEDICATION INSTRUCTIONS -       - MEDICATION INSTRUCTIONS -       sodium chloride Stopped (09/10/19 2005)       artificial tears   Both Eyes Q8H     furosemide  60 mg Intravenous Q6H     ipratropium - albuterol 0.5 mg/2.5 mg/3 mL  3 mL Nebulization 4x daily     multivitamins w/minerals  15 mL Per Feeding Tube Daily     pantoprazole (PROTONIX) IV  40 mg Intravenous BID     senna-docusate  1 tablet Oral or Feeding Tube At Bedtime     sodium chloride (PF)  3 mL Intracatheter Q8H     vancomycin (VANCOCIN) IV  2,750 mg (central catheter) Intravenous Q24H     vitamin B1  100 mg Oral or Feeding Tube Daily       Data   Recent Labs   Lab 09/15/19  0350 09/14/19  2211 09/14/19  1557   WBC 13.0* 12.9* 12.9*   HGB 8.0* 8.1* 8.4*   MCV 96 96 95   PLT 65* 48* 63*   INR 1.21* 1.22* 1.20*    144 143   POTASSIUM 4.0 4.0 3.9   CHLORIDE 111* 110* 110*   CO2 25 26 26   BUN 30 32* 35*   CR 0.85 0.85 0.89   ANIONGAP 7 8 7   MARIA INES 8.1* 8.3* 8.3*   *  117* 108*  110* 113*  114*   ALBUMIN 2.3* 2.4* 2.0*   PROTTOTAL 5.3* 5.7* 5.5*   BILITOTAL 0.9 0.8 1.1   ALKPHOS 78 80 78   ALT 30 33 35   AST 15 14 14   TROPI <0.015 0.023 0.030       Recent Results (from the past 24 hour(s))   CT Chest w/o Contrast    Narrative    CT CHEST W/O CONTRAST 9/14/2019 6:11 PM    Comparison: CT cap 9/10/2019, CT chest 9/9/2018 , CT abdomen pelvis  4/12/2017, 7/9/2009.    History: Pleural effusion    Technique: CT of the chest obtained without intravenous contrast.  Axial, coronal, and sagittal reconstructions were obtained and  reviewed.    Findings:    Chest: The heart size is enlarged. No significant pericardial  effusion. Right IJ central venous catheter tip terminates in the low  SVC. Inferior approach ECMO cannula tip terminates in the low right  atrium. Mild calcification of the aortic and mitral annulus. No  enlarged mediastinal lymph nodes.    Endotracheal tube tip terminates above the iain. Bowing of the  posterior membrane of  the trachea and narrowing of both main stem  bronchi. Extensive groundglass and patchy consolidative opacities in  the right lung, with patchy groundglass and depending consolidation in  the left lung. Small bilateral pleural effusions, not significantly  changed from the prior CT on the left, and increased on the right.    Upper abdomen: Evaluation of the upper abdomen is limited. Enteric  tube is looped in the stomach and continues below the inferior field  of view of the scan. Thickening of the adrenal glands, with an  unchanged right adrenal adenoma (previously characterized 7/9/2009).    Bones: Unchanged bilateral fifth and sixth anterolateral rib  fractures. There is also a nondisplaced sternal fracture, with an  enlarged overlying hematoma measuring up to 3.5 x 3.3 cm.      Impression    Impression:   1. Unchanged small left pleural effusion, and increased small right  pleural effusion.  2. Increased patchy groundglass and consolidative opacities in the  lungs, which may represent pulmonary infarction, pulmonary edema,  infection, or hemorrhage.  3. Narrowing of the distal trachea and mainstem bronchi, which is at  least in part due to acquisition during expiration. However, there  appears to also be a component of tracheobronchomalacia.  4. Unchanged bilateral rib fractures. There is also a nondisplaced  fracture of the mid sternal body, with an increased overlying  subcutaneous hematoma.    I have personally reviewed the examination and initial interpretation  and I agree with the findings.    FLOR MCCRAY MD   XR Chest Port 1 View    Narrative    Exam: XR CHEST PORT 1 VW, 9/15/2019 6:37 AM    Indication: Check endotracheal tube placement and ECLS cannula  placement.     Comparison: CT 9/14/2019, chest x-ray 9/14/2019    Findings:   Patient is rotated. Endotracheal tube tip projects over the mid  trachea. Gastric tube and enteric tube course below the field of view.  Right-sided central venous catheter tip  projects over the superior  cavoatrial junction. Heart is enlarged. Increased opacities in the  right chest. Improved aeration of the left upper lung, with persistent  left basilar consolidation versus atelectasis. Bilateral pleural  effusions, left larger than right. No pneumothorax.      Impression    Impression: Increased opacities in the right lung. Improved aeration  of the left upper lung with persistent left basilar consolidation  versus atelectasis. Bilateral pleural effusions, left greater than  right.    JUSTEN CANO MD     I have seen and examined the patient with the [facility] team. I agree with the assessment and plan of the note above.I have reviewed pertinent labs.     Raffaele Stuart MD  Interventional Cardiology  Pager: [phone]

## 2019-09-15 NOTE — PROGRESS NOTES
ECMO Shift Summary:    Patient remains on [facility] ECMO, all equipment is functioning and alarms are appropriately set. RPM's 3000 with flow range 2.94-3.13 L/min. Sweep gas is at 0.5 LPM and FiO2 50%. Circuit remains free of air, clot and fibrin. Cannulas are secure with no bleeding from site. Extremities are warm. Suctioned ETT for moderate bloody secretions.    Significant Shift Events: none    Vent settings:  Ventilation Mode: CMV/AC  (Continuous Mandatory Ventilation/ Assist Control)  FiO2 (%): 50 %  Rate Set (breaths/minute): 12 breaths/min  Tidal Volume Set (mL): 450 mL  PEEP (cm H2O): 8 cmH2O  Oxygen Concentration (%): 50 %  Resp: 16  .    Heparin is running at 2950 u/kg/hr, ACT range 163-183.    Urine output is good, blood loss was none. Product given included none.      Intake/Output Summary (Last 24 hours) at 9/15/2019 1744  Last data filed at 9/15/2019 1700  Gross per 24 hour   Intake 8933.57 ml   Output 8840 ml   Net 93.57 ml       ECHO:  No results found for this or any previous visit.  Results for orders placed in visit on 18   Echocardiogram Complete    Narrative [id number]  [id number]  [id number]  [id number]^CARRINGTON^VINOD^WILLIS           [facility]  Diagnostic and Treamtent-3rd Floor  [address] [place] [id number]     Name: XAVIER ROWELL  MRN: [id number]  : 1960  Study Date: 2018 05:59 PM  Age: 58 yrs  Gender: Female  [place] [facility] [place] For Study: Cough, Edema extremities  Ordering Physician: VINOD SHULTZ  Referring Physician: VINOD SHULTZ  Performed By: Ryley Ridley RDCS     BSA: 2.7 m2  Height: 66 in  Weight: 418 lb  HR: 85  BP: 148/77 mmHg  _____________________________________________________________________________  __        Procedure  Echocardiogram with two-dimensional, color and spectral Doppler performed.  _____________________________________________________________________________  __         Interpretation Summary  Moderate concentric wall thickening consistent with left ventricular  hypertrophy is present.  Biventricular size and systolic function is normal.The LVEF is 55-60%.  No significant valve disease.  Inferior vena cava appers dilated measuring 2.4 cm  _____________________________________________________________________________  __        Left Ventricle  Left ventricular size is normal. Left ventricular function is normal.The EF is  55-60%. Moderate concentric wall thickening consistent with left ventricular  hypertrophy is present. Left ventricular diastolic function is indeterminate.  Regional wall motion is probably normal. Endocardial border definition  suboptimal and contrast not given.     Right Ventricle  The right ventricle is normal size. Global right ventricular function is  normal.     Atria  Both atria appear normal.     Mitral Valve  Mild mitral annular calcification is present. Trace mitral insufficiency is  present.        Aortic Valve  Mild aortic valve sclerosis is present. No aortic regurgitation is present.  Transvalvular Doppler is normal and without signs of signifcant stenosis.     Tricuspid Valve  The peak velocity of the tricuspid regurgitant jet is not obtainable.  Pulmonary artery systolic pressure cannot be assessed.     Pulmonic Valve  The pulmonic valve cannot be assessed.     Vessels  The aorta root is normal. Inferior vena cava appers dilated measuring 2.4 cm.  Ascending aorta 3.3 cm.     Pericardium  No pericardial effusion is present.     _____________________________________________________________________________  __  MMode/2D Measurements & Calculations  IVSd: 1.5 cm  LVIDd: 5.4 cm  LVIDs: 3.7 cm  LVPWd: 1.3 cm  FS: 30.5 %  LV mass(C)d: 323.5 grams  LV mass(C)dI: 118.2 grams/m2     asc Aorta Diam: 3.3 cm  LVOT diam: 2.2 cm  LVOT area: 4.0 cm2  LA Volume (BP): 71.6 ml  LA Volume Index (BP): 26.1 ml/m2  RWT: 0.47        Doppler Measurements & Calculations  MV  E max emile: 67.4 cm/sec  MV A max emile: 88.8 cm/sec  MV E/A: 0.76  MV dec time: 0.17 sec  Ao V2 max: 203.2 cm/sec  Ao max P.0 mmHg  MERON(V,D): 2.3 cm2     LV V1 max P.5 mmHg  LV V1 max: 116.8 cm/sec  AV Emile Ratio (DI): 0.57  E/E' av.0  Lateral E/e': 13.4  Medial E/e': 12.6     _____________________________________________________________________________  __           Report approved by: Tariq Pappas 2018 10:50 AM          CXR:  Recent Results (from the past 24 hour(s))   CT Chest w/o Contrast    Narrative    CT CHEST W/O CONTRAST 2019 6:11 PM    Comparison: CT cap 9/10/2019, CT chest 2018 , CT abdomen pelvis  2017, 2009.    History: Pleural effusion    Technique: CT of the chest obtained without intravenous contrast.  Axial, coronal, and sagittal reconstructions were obtained and  reviewed.    Findings:    Chest: The heart size is enlarged. No significant pericardial  effusion. Right IJ central venous catheter tip terminates in the low  SVC. Inferior approach ECMO cannula tip terminates in the low right  atrium. Mild calcification of the aortic and mitral annulus. No  enlarged mediastinal lymph nodes.    Endotracheal tube tip terminates above the iain. Bowing of the  posterior membrane of the trachea and narrowing of both main stem  bronchi. Extensive groundglass and patchy consolidative opacities in  the right lung, with patchy groundglass and depending consolidation in  the left lung. Small bilateral pleural effusions, not significantly  changed from the prior CT on the left, and increased on the right.    Upper abdomen: Evaluation of the upper abdomen is limited. Enteric  tube is looped in the stomach and continues below the inferior field  of view of the scan. Thickening of the adrenal glands, with an  unchanged right adrenal adenoma (previously characterized 2009).    Bones: Unchanged bilateral fifth and sixth anterolateral rib  fractures. There is also a  nondisplaced sternal fracture, with an  enlarged overlying hematoma measuring up to 3.5 x 3.3 cm.      Impression    Impression:   1. Unchanged small left pleural effusion, and increased small right  pleural effusion.  2. Increased patchy groundglass and consolidative opacities in the  lungs, which may represent pulmonary infarction, pulmonary edema,  infection, or hemorrhage.  3. Narrowing of the distal trachea and mainstem bronchi, which is at  least in part due to acquisition during expiration. However, there  appears to also be a component of tracheobronchomalacia.  4. Unchanged bilateral rib fractures. There is also a nondisplaced  fracture of the mid sternal body, with an increased overlying  subcutaneous hematoma.    I have personally reviewed the examination and initial interpretation  and I agree with the findings.    FLOR MCCRAY MD   XR Chest Port 1 View    Narrative    Exam: XR CHEST PORT 1 VW, 9/15/2019 6:37 AM    Indication: Check endotracheal tube placement and ECLS cannula  placement.     Comparison: CT 9/14/2019, chest x-ray 9/14/2019    Findings:   Patient is rotated. Endotracheal tube tip projects over the mid  trachea. Gastric tube and enteric tube course below the field of view.  Right-sided central venous catheter tip projects over the superior  cavoatrial junction. Heart is enlarged. Increased opacities in the  right chest. Improved aeration of the left upper lung, with persistent  left basilar consolidation versus atelectasis. Bilateral pleural  effusions, left larger than right. No pneumothorax.      Impression    Impression: Increased opacities in the right lung. Improved aeration  of the left upper lung with persistent left basilar consolidation  versus atelectasis. Bilateral pleural effusions, left greater than  right.    JUSTEN CANO MD       Labs:  Recent Labs   Lab 09/15/19  1550 09/15/19  1452 09/15/19  1159 09/15/19  1006   PH 7.41 7.41 7.40 7.41   PCO2 44 43 44 43   PO2 78* 75*  80 85   HCO3 28 28 27 27   O2PER 50.0 50 50 50       Lab Results   Component Value Date    HGB 8.6 (L) 09/15/2019    PHGB <30 09/15/2019    PLT 81 (L) 09/15/2019    FIBR 744 (H) 09/15/2019    INR 1.19 (H) 09/15/2019    PTT 95 (H) 09/15/2019    DD 11.6 (H) 09/15/2019    AXA 0.46 09/15/2019    ANTCH 49 (L) 09/15/2019         Plan is to continue ECMO support.      Deja Ash, RT, RRT  9/15/2019 5:44 PM

## 2019-09-15 NOTE — PLAN OF CARE
Re: Shift Summary  D/I: Albumin 250cc IV given x3 for chugging. Peep increased from 6 to 8 d/t low PO2 and pulmonary edema. Bronch done at bedside per MD at 1400 to investigate source of christen out lung on AM CXR, only results identified was pulm edema. CT done at 1800 eval for pleural effusions.- traveled w ecmo tech x1, RT x1, RNx1 and transport x2 on monitor.  P: Cont to monitor. Recheck ABG at 1930.

## 2019-09-16 ENCOUNTER — APPOINTMENT (OUTPATIENT)
Dept: GENERAL RADIOLOGY | Facility: CLINIC | Age: 59
DRG: 003 | End: 2019-09-16
Attending: INTERNAL MEDICINE
Payer: COMMERCIAL

## 2019-09-16 LAB
ALBUMIN SERPL-MCNC: 2.1 G/DL (ref 3.4–5)
ALBUMIN SERPL-MCNC: 2.2 G/DL (ref 3.4–5)
ALBUMIN SERPL-MCNC: 2.2 G/DL (ref 3.4–5)
ALBUMIN SERPL-MCNC: 2.3 G/DL (ref 3.4–5)
ALP SERPL-CCNC: 84 U/L (ref 40–150)
ALP SERPL-CCNC: 88 U/L (ref 40–150)
ALP SERPL-CCNC: 91 U/L (ref 40–150)
ALP SERPL-CCNC: 96 U/L (ref 40–150)
ALT SERPL W P-5'-P-CCNC: 30 U/L (ref 0–50)
ALT SERPL W P-5'-P-CCNC: 30 U/L (ref 0–50)
ALT SERPL W P-5'-P-CCNC: 34 U/L (ref 0–50)
ALT SERPL W P-5'-P-CCNC: 35 U/L (ref 0–50)
ANION GAP SERPL CALCULATED.3IONS-SCNC: 10 MMOL/L (ref 3–14)
ANION GAP SERPL CALCULATED.3IONS-SCNC: 5 MMOL/L (ref 3–14)
ANION GAP SERPL CALCULATED.3IONS-SCNC: 6 MMOL/L (ref 3–14)
ANION GAP SERPL CALCULATED.3IONS-SCNC: 7 MMOL/L (ref 3–14)
APTT PPP: 69 SEC (ref 22–37)
APTT PPP: 79 SEC (ref 22–37)
APTT PPP: 83 SEC (ref 22–37)
AST SERPL W P-5'-P-CCNC: 17 U/L (ref 0–45)
AST SERPL W P-5'-P-CCNC: 17 U/L (ref 0–45)
AST SERPL W P-5'-P-CCNC: 18 U/L (ref 0–45)
AST SERPL W P-5'-P-CCNC: 22 U/L (ref 0–45)
AT III ACT/NOR PPP CHRO: 57 % (ref 85–135)
BACTERIA SPEC CULT: NO GROWTH
BASE EXCESS BLDA CALC-SCNC: 3.2 MMOL/L
BASE EXCESS BLDA CALC-SCNC: 3.6 MMOL/L
BASE EXCESS BLDA CALC-SCNC: 4.3 MMOL/L
BASE EXCESS BLDA CALC-SCNC: 4.3 MMOL/L
BASE EXCESS BLDA CALC-SCNC: 4.4 MMOL/L
BASE EXCESS BLDA CALC-SCNC: 5.2 MMOL/L
BASE EXCESS BLDA CALC-SCNC: 5.4 MMOL/L
BASE EXCESS BLDA CALC-SCNC: 5.4 MMOL/L
BASE EXCESS BLDA CALC-SCNC: 6 MMOL/L
BASE EXCESS BLDA CALC-SCNC: 6.4 MMOL/L
BASE EXCESS BLDA CALC-SCNC: 6.5 MMOL/L
BASE EXCESS BLDA CALC-SCNC: 6.5 MMOL/L
BASE EXCESS BLDA CALC-SCNC: 6.7 MMOL/L
BASE EXCESS BLDA CALC-SCNC: 6.8 MMOL/L
BASE EXCESS BLDA CALC-SCNC: 6.8 MMOL/L
BASE EXCESS BLDV CALC-SCNC: 1.9 MMOL/L
BASE EXCESS BLDV CALC-SCNC: 5.5 MMOL/L
BILIRUB SERPL-MCNC: 0.8 MG/DL (ref 0.2–1.3)
BILIRUB SERPL-MCNC: 0.8 MG/DL (ref 0.2–1.3)
BILIRUB SERPL-MCNC: 1 MG/DL (ref 0.2–1.3)
BILIRUB SERPL-MCNC: 1.2 MG/DL (ref 0.2–1.3)
BLD PROD TYP BPU: NORMAL
BLD UNIT ID BPU: 0
BLOOD PRODUCT CODE: NORMAL
BPU ID: NORMAL
BUN SERPL-MCNC: 31 MG/DL (ref 7–30)
BUN SERPL-MCNC: 33 MG/DL (ref 7–30)
BUN SERPL-MCNC: 34 MG/DL (ref 7–30)
BUN SERPL-MCNC: 34 MG/DL (ref 7–30)
CA-I BLD-MCNC: 4.8 MG/DL (ref 4.4–5.2)
CA-I BLD-MCNC: 4.9 MG/DL (ref 4.4–5.2)
CALCIUM SERPL-MCNC: 8.4 MG/DL (ref 8.5–10.1)
CALCIUM SERPL-MCNC: 8.6 MG/DL (ref 8.5–10.1)
CALCIUM SERPL-MCNC: 8.8 MG/DL (ref 8.5–10.1)
CALCIUM SERPL-MCNC: 8.8 MG/DL (ref 8.5–10.1)
CHLORIDE SERPL-SCNC: 100 MMOL/L (ref 94–109)
CHLORIDE SERPL-SCNC: 102 MMOL/L (ref 94–109)
CHLORIDE SERPL-SCNC: 102 MMOL/L (ref 94–109)
CHLORIDE SERPL-SCNC: 104 MMOL/L (ref 94–109)
CO2 SERPL-SCNC: 27 MMOL/L (ref 20–32)
CO2 SERPL-SCNC: 29 MMOL/L (ref 20–32)
CO2 SERPL-SCNC: 29 MMOL/L (ref 20–32)
CO2 SERPL-SCNC: 30 MMOL/L (ref 20–32)
CREAT SERPL-MCNC: 0.9 MG/DL (ref 0.52–1.04)
CREAT SERPL-MCNC: 0.94 MG/DL (ref 0.52–1.04)
CREAT SERPL-MCNC: 0.94 MG/DL (ref 0.52–1.04)
CREAT SERPL-MCNC: 0.97 MG/DL (ref 0.52–1.04)
CRP SERPL-MCNC: 140 MG/L (ref 0–8)
D DIMER PPP FEU-MCNC: 9.6 UG/ML FEU (ref 0–0.5)
D DIMER PPP FEU-MCNC: 9.8 UG/ML FEU (ref 0–0.5)
ERYTHROCYTE [DISTWIDTH] IN BLOOD BY AUTOMATED COUNT: 15.9 % (ref 10–15)
ERYTHROCYTE [DISTWIDTH] IN BLOOD BY AUTOMATED COUNT: 16 % (ref 10–15)
ERYTHROCYTE [SEDIMENTATION RATE] IN BLOOD BY WESTERGREN METHOD: 86 MM/H (ref 0–30)
FIBRINOGEN PPP-MCNC: 871 MG/DL (ref 200–420)
FIBRINOGEN PPP-MCNC: 871 MG/DL (ref 200–420)
GFR SERPL CREATININE-BSD FRML MDRD: 64 ML/MIN/{1.73_M2}
GFR SERPL CREATININE-BSD FRML MDRD: 67 ML/MIN/{1.73_M2}
GFR SERPL CREATININE-BSD FRML MDRD: 67 ML/MIN/{1.73_M2}
GFR SERPL CREATININE-BSD FRML MDRD: 70 ML/MIN/{1.73_M2}
GLUCOSE BLD-MCNC: 115 MG/DL (ref 70–99)
GLUCOSE BLD-MCNC: 120 MG/DL (ref 70–99)
GLUCOSE BLD-MCNC: 128 MG/DL (ref 70–99)
GLUCOSE BLD-MCNC: 131 MG/DL (ref 70–99)
GLUCOSE BLDC GLUCOMTR-MCNC: 111 MG/DL (ref 70–99)
GLUCOSE BLDC GLUCOMTR-MCNC: 125 MG/DL (ref 70–99)
GLUCOSE SERPL-MCNC: 106 MG/DL (ref 70–99)
GLUCOSE SERPL-MCNC: 120 MG/DL (ref 70–99)
GLUCOSE SERPL-MCNC: 124 MG/DL (ref 70–99)
GLUCOSE SERPL-MCNC: 126 MG/DL (ref 70–99)
HCO3 BLD-SCNC: 28 MMOL/L (ref 21–28)
HCO3 BLD-SCNC: 28 MMOL/L (ref 21–28)
HCO3 BLD-SCNC: 29 MMOL/L (ref 21–28)
HCO3 BLD-SCNC: 29 MMOL/L (ref 21–28)
HCO3 BLD-SCNC: 30 MMOL/L (ref 21–28)
HCO3 BLD-SCNC: 30 MMOL/L (ref 21–28)
HCO3 BLD-SCNC: 31 MMOL/L (ref 21–28)
HCO3 BLD-SCNC: 32 MMOL/L (ref 21–28)
HCO3 BLDA-SCNC: 31 MMOL/L (ref 21–28)
HCO3 BLDA-SCNC: 32 MMOL/L (ref 21–28)
HCO3 BLDV-SCNC: 27 MMOL/L (ref 21–28)
HCO3 BLDV-SCNC: 32 MMOL/L (ref 21–28)
HCT VFR BLD AUTO: 25.5 % (ref 35–47)
HCT VFR BLD AUTO: 26 % (ref 35–47)
HCT VFR BLD AUTO: 27.7 % (ref 35–47)
HCT VFR BLD AUTO: 28.5 % (ref 35–47)
HGB BLD-MCNC: 8.2 G/DL (ref 11.7–15.7)
HGB BLD-MCNC: 8.2 G/DL (ref 11.7–15.7)
HGB BLD-MCNC: 8.8 G/DL (ref 11.7–15.7)
HGB BLD-MCNC: 8.9 G/DL (ref 11.7–15.7)
HGB FREE PLAS-MCNC: <30 MG/DL
INR PPP: 1.22 (ref 0.86–1.14)
INR PPP: 1.22 (ref 0.86–1.14)
INR PPP: 1.23 (ref 0.86–1.14)
INR PPP: 1.25 (ref 0.86–1.14)
INTERPRETATION ECG - MUSE: NORMAL
KCT BLD-ACNC: 155 SEC (ref 75–150)
KCT BLD-ACNC: 158 SEC (ref 75–150)
KCT BLD-ACNC: 158 SEC (ref 75–150)
KCT BLD-ACNC: 162 SEC (ref 75–150)
KCT BLD-ACNC: 163 SEC (ref 75–150)
KCT BLD-ACNC: 163 SEC (ref 75–150)
KCT BLD-ACNC: 167 SEC (ref 75–150)
KCT BLD-ACNC: 171 SEC (ref 75–150)
KCT BLD-ACNC: 175 SEC (ref 75–150)
KCT BLD-ACNC: 179 SEC (ref 75–150)
KCT BLD-ACNC: 179 SEC (ref 75–150)
KCT BLD-ACNC: 183 SEC (ref 75–150)
LACTATE BLD-SCNC: 0.4 MMOL/L (ref 0.7–2)
LACTATE BLD-SCNC: 0.4 MMOL/L (ref 0.7–2)
LACTATE BLD-SCNC: 0.5 MMOL/L (ref 0.7–2)
LACTATE BLD-SCNC: 0.5 MMOL/L (ref 0.7–2)
LDH SERPL L TO P-CCNC: 433 U/L (ref 81–234)
LMWH PPP CHRO-ACNC: 0.28 IU/ML
LMWH PPP CHRO-ACNC: 0.31 IU/ML
LMWH PPP CHRO-ACNC: 0.34 IU/ML
LMWH PPP CHRO-ACNC: 0.36 IU/ML
Lab: NORMAL
MAGNESIUM SERPL-MCNC: 2 MG/DL (ref 1.6–2.3)
MAGNESIUM SERPL-MCNC: 2.1 MG/DL (ref 1.6–2.3)
MAGNESIUM SERPL-MCNC: 2.2 MG/DL (ref 1.6–2.3)
MAGNESIUM SERPL-MCNC: 2.3 MG/DL (ref 1.6–2.3)
MCH RBC QN AUTO: 28.8 PG (ref 26.5–33)
MCH RBC QN AUTO: 29.5 PG (ref 26.5–33)
MCH RBC QN AUTO: 29.9 PG (ref 26.5–33)
MCH RBC QN AUTO: 30 PG (ref 26.5–33)
MCHC RBC AUTO-ENTMCNC: 30.9 G/DL (ref 31.5–36.5)
MCHC RBC AUTO-ENTMCNC: 31.5 G/DL (ref 31.5–36.5)
MCHC RBC AUTO-ENTMCNC: 32.1 G/DL (ref 31.5–36.5)
MCHC RBC AUTO-ENTMCNC: 32.2 G/DL (ref 31.5–36.5)
MCV RBC AUTO: 93 FL (ref 78–100)
MCV RBC AUTO: 94 FL (ref 78–100)
O2/TOTAL GAS SETTING VFR VENT: 50 %
O2/TOTAL GAS SETTING VFR VENT: 60 %
O2/TOTAL GAS SETTING VFR VENT: 70 %
O2/TOTAL GAS SETTING VFR VENT: 70 %
OXYHGB MFR BLD: 85 % (ref 92–100)
OXYHGB MFR BLD: 92 % (ref 92–100)
OXYHGB MFR BLD: 93 % (ref 92–100)
OXYHGB MFR BLD: 93 % (ref 92–100)
OXYHGB MFR BLD: 94 % (ref 92–100)
OXYHGB MFR BLD: 95 % (ref 92–100)
OXYHGB MFR BLD: 96 % (ref 92–100)
OXYHGB MFR BLDA: 96 % (ref 75–100)
OXYHGB MFR BLDA: 98 % (ref 75–100)
OXYHGB MFR BLDV: 75 %
OXYHGB MFR BLDV: 90 %
PCO2 BLD: 42 MM HG (ref 35–45)
PCO2 BLD: 42 MM HG (ref 35–45)
PCO2 BLD: 43 MM HG (ref 35–45)
PCO2 BLD: 44 MM HG (ref 35–45)
PCO2 BLD: 45 MM HG (ref 35–45)
PCO2 BLD: 46 MM HG (ref 35–45)
PCO2 BLD: 47 MM HG (ref 35–45)
PCO2 BLD: 48 MM HG (ref 35–45)
PCO2 BLDA: 52 MM HG (ref 35–45)
PCO2 BLDA: 55 MM HG (ref 35–45)
PCO2 BLDV: 46 MM HG (ref 40–50)
PCO2 BLDV: 53 MM HG (ref 40–50)
PH BLD: 7.42 PH (ref 7.35–7.45)
PH BLD: 7.43 PH (ref 7.35–7.45)
PH BLD: 7.44 PH (ref 7.35–7.45)
PH BLD: 7.45 PH (ref 7.35–7.45)
PH BLDA: 7.36 PH (ref 7.35–7.45)
PH BLDA: 7.39 PH (ref 7.35–7.45)
PH BLDV: 7.38 PH (ref 7.32–7.43)
PH BLDV: 7.38 PH (ref 7.32–7.43)
PHOSPHATE SERPL-MCNC: 4.6 MG/DL (ref 2.5–4.5)
PHOSPHATE SERPL-MCNC: 5 MG/DL (ref 2.5–4.5)
PLATELET # BLD AUTO: 107 10E9/L (ref 150–450)
PLATELET # BLD AUTO: 71 10E9/L (ref 150–450)
PLATELET # BLD AUTO: 99 10E9/L (ref 150–450)
PLATELET # BLD AUTO: 99 10E9/L (ref 150–450)
PO2 BLD: 50 MM HG (ref 80–105)
PO2 BLD: 66 MM HG (ref 80–105)
PO2 BLD: 73 MM HG (ref 80–105)
PO2 BLD: 74 MM HG (ref 80–105)
PO2 BLD: 79 MM HG (ref 80–105)
PO2 BLD: 83 MM HG (ref 80–105)
PO2 BLD: 84 MM HG (ref 80–105)
PO2 BLD: 84 MM HG (ref 80–105)
PO2 BLD: 85 MM HG (ref 80–105)
PO2 BLD: 86 MM HG (ref 80–105)
PO2 BLD: 90 MM HG (ref 80–105)
PO2 BLD: 96 MM HG (ref 80–105)
PO2 BLD: 97 MM HG (ref 80–105)
PO2 BLDA: 117 MM HG (ref 80–105)
PO2 BLDA: 263 MM HG (ref 80–105)
PO2 BLDV: 44 MM HG (ref 25–47)
PO2 BLDV: 67 MM HG (ref 25–47)
POTASSIUM BLD-SCNC: 4.1 MMOL/L (ref 3.4–5.3)
POTASSIUM SERPL-SCNC: 3.6 MMOL/L (ref 3.4–5.3)
POTASSIUM SERPL-SCNC: 3.6 MMOL/L (ref 3.4–5.3)
POTASSIUM SERPL-SCNC: 3.8 MMOL/L (ref 3.4–5.3)
POTASSIUM SERPL-SCNC: 3.9 MMOL/L (ref 3.4–5.3)
PROT SERPL-MCNC: 5.7 G/DL (ref 6.8–8.8)
PROT SERPL-MCNC: 5.8 G/DL (ref 6.8–8.8)
PROT SERPL-MCNC: 5.9 G/DL (ref 6.8–8.8)
PROT SERPL-MCNC: 6 G/DL (ref 6.8–8.8)
RBC # BLD AUTO: 2.74 10E12/L (ref 3.8–5.2)
RBC # BLD AUTO: 2.78 10E12/L (ref 3.8–5.2)
RBC # BLD AUTO: 2.97 10E12/L (ref 3.8–5.2)
RBC # BLD AUTO: 3.06 10E12/L (ref 3.8–5.2)
SODIUM SERPL-SCNC: 136 MMOL/L (ref 133–144)
SODIUM SERPL-SCNC: 137 MMOL/L (ref 133–144)
SODIUM SERPL-SCNC: 138 MMOL/L (ref 133–144)
SODIUM SERPL-SCNC: 139 MMOL/L (ref 133–144)
SPECIMEN SOURCE: NORMAL
TRANSFUSION STATUS PATIENT QL: NORMAL
TRANSFUSION STATUS PATIENT QL: NORMAL
TROPONIN I SERPL-MCNC: 0.02 UG/L (ref 0–0.04)
TROPONIN I SERPL-MCNC: <0.015 UG/L (ref 0–0.04)
WBC # BLD AUTO: 14.4 10E9/L (ref 4–11)
WBC # BLD AUTO: 14.9 10E9/L (ref 4–11)
WBC # BLD AUTO: 15.1 10E9/L (ref 4–11)
WBC # BLD AUTO: 16.5 10E9/L (ref 4–11)

## 2019-09-16 PROCEDURE — 25000132 ZZH RX MED GY IP 250 OP 250 PS 637: Performed by: INTERNAL MEDICINE

## 2019-09-16 PROCEDURE — 80053 COMPREHEN METABOLIC PANEL: CPT | Performed by: STUDENT IN AN ORGANIZED HEALTH CARE EDUCATION/TRAINING PROGRAM

## 2019-09-16 PROCEDURE — 85610 PROTHROMBIN TIME: CPT | Performed by: STUDENT IN AN ORGANIZED HEALTH CARE EDUCATION/TRAINING PROGRAM

## 2019-09-16 PROCEDURE — 85384 FIBRINOGEN ACTIVITY: CPT | Performed by: STUDENT IN AN ORGANIZED HEALTH CARE EDUCATION/TRAINING PROGRAM

## 2019-09-16 PROCEDURE — 84132 ASSAY OF SERUM POTASSIUM: CPT | Performed by: INTERNAL MEDICINE

## 2019-09-16 PROCEDURE — 83735 ASSAY OF MAGNESIUM: CPT | Performed by: STUDENT IN AN ORGANIZED HEALTH CARE EDUCATION/TRAINING PROGRAM

## 2019-09-16 PROCEDURE — 25000128 H RX IP 250 OP 636: Performed by: STUDENT IN AN ORGANIZED HEALTH CARE EDUCATION/TRAINING PROGRAM

## 2019-09-16 PROCEDURE — 83605 ASSAY OF LACTIC ACID: CPT | Performed by: INTERNAL MEDICINE

## 2019-09-16 PROCEDURE — C9113 INJ PANTOPRAZOLE SODIUM, VIA: HCPCS | Performed by: STUDENT IN AN ORGANIZED HEALTH CARE EDUCATION/TRAINING PROGRAM

## 2019-09-16 PROCEDURE — 82805 BLOOD GASES W/O2 SATURATION: CPT | Performed by: INTERNAL MEDICINE

## 2019-09-16 PROCEDURE — 93010 ELECTROCARDIOGRAM REPORT: CPT | Performed by: INTERNAL MEDICINE

## 2019-09-16 PROCEDURE — 82805 BLOOD GASES W/O2 SATURATION: CPT | Performed by: STUDENT IN AN ORGANIZED HEALTH CARE EDUCATION/TRAINING PROGRAM

## 2019-09-16 PROCEDURE — 85652 RBC SED RATE AUTOMATED: CPT | Performed by: STUDENT IN AN ORGANIZED HEALTH CARE EDUCATION/TRAINING PROGRAM

## 2019-09-16 PROCEDURE — P9016 RBC LEUKOCYTES REDUCED: HCPCS

## 2019-09-16 PROCEDURE — 85730 THROMBOPLASTIN TIME PARTIAL: CPT | Performed by: STUDENT IN AN ORGANIZED HEALTH CARE EDUCATION/TRAINING PROGRAM

## 2019-09-16 PROCEDURE — 25000125 ZZHC RX 250: Performed by: STUDENT IN AN ORGANIZED HEALTH CARE EDUCATION/TRAINING PROGRAM

## 2019-09-16 PROCEDURE — 25800030 ZZH RX IP 258 OP 636: Performed by: STUDENT IN AN ORGANIZED HEALTH CARE EDUCATION/TRAINING PROGRAM

## 2019-09-16 PROCEDURE — 84484 ASSAY OF TROPONIN QUANT: CPT | Performed by: STUDENT IN AN ORGANIZED HEALTH CARE EDUCATION/TRAINING PROGRAM

## 2019-09-16 PROCEDURE — 83051 HEMOGLOBIN PLASMA: CPT | Performed by: STUDENT IN AN ORGANIZED HEALTH CARE EDUCATION/TRAINING PROGRAM

## 2019-09-16 PROCEDURE — 25000128 H RX IP 250 OP 636: Performed by: INTERNAL MEDICINE

## 2019-09-16 PROCEDURE — 25800030 ZZH RX IP 258 OP 636: Performed by: INTERNAL MEDICINE

## 2019-09-16 PROCEDURE — 27210437 ZZH NUTRITION PRODUCT SEMIELEM INTERMED LITER

## 2019-09-16 PROCEDURE — 85379 FIBRIN DEGRADATION QUANT: CPT | Performed by: STUDENT IN AN ORGANIZED HEALTH CARE EDUCATION/TRAINING PROGRAM

## 2019-09-16 PROCEDURE — 25000132 ZZH RX MED GY IP 250 OP 250 PS 637: Performed by: STUDENT IN AN ORGANIZED HEALTH CARE EDUCATION/TRAINING PROGRAM

## 2019-09-16 PROCEDURE — 71045 X-RAY EXAM CHEST 1 VIEW: CPT

## 2019-09-16 PROCEDURE — 40000014 ZZH STATISTIC ARTERIAL MONITORING DAILY

## 2019-09-16 PROCEDURE — 82330 ASSAY OF CALCIUM: CPT | Performed by: STUDENT IN AN ORGANIZED HEALTH CARE EDUCATION/TRAINING PROGRAM

## 2019-09-16 PROCEDURE — 93005 ELECTROCARDIOGRAM TRACING: CPT

## 2019-09-16 PROCEDURE — 82330 ASSAY OF CALCIUM: CPT | Performed by: INTERNAL MEDICINE

## 2019-09-16 PROCEDURE — 85520 HEPARIN ASSAY: CPT | Performed by: STUDENT IN AN ORGANIZED HEALTH CARE EDUCATION/TRAINING PROGRAM

## 2019-09-16 PROCEDURE — 85027 COMPLETE CBC AUTOMATED: CPT | Performed by: STUDENT IN AN ORGANIZED HEALTH CARE EDUCATION/TRAINING PROGRAM

## 2019-09-16 PROCEDURE — 86140 C-REACTIVE PROTEIN: CPT | Performed by: STUDENT IN AN ORGANIZED HEALTH CARE EDUCATION/TRAINING PROGRAM

## 2019-09-16 PROCEDURE — 94003 VENT MGMT INPAT SUBQ DAY: CPT

## 2019-09-16 PROCEDURE — 82947 ASSAY GLUCOSE BLOOD QUANT: CPT | Performed by: INTERNAL MEDICINE

## 2019-09-16 PROCEDURE — 33949 ECMO/ECLS DAILY MGMT ARTERY: CPT

## 2019-09-16 PROCEDURE — 00000146 ZZHCL STATISTIC GLUCOSE BY METER IP

## 2019-09-16 PROCEDURE — 85347 COAGULATION TIME ACTIVATED: CPT

## 2019-09-16 PROCEDURE — 82947 ASSAY GLUCOSE BLOOD QUANT: CPT | Performed by: STUDENT IN AN ORGANIZED HEALTH CARE EDUCATION/TRAINING PROGRAM

## 2019-09-16 PROCEDURE — 94640 AIRWAY INHALATION TREATMENT: CPT

## 2019-09-16 PROCEDURE — P9041 ALBUMIN (HUMAN),5%, 50ML: HCPCS | Performed by: STUDENT IN AN ORGANIZED HEALTH CARE EDUCATION/TRAINING PROGRAM

## 2019-09-16 PROCEDURE — 40000275 ZZH STATISTIC RCP TIME EA 10 MIN

## 2019-09-16 PROCEDURE — 20000004 ZZH R&B ICU UMMC

## 2019-09-16 PROCEDURE — 85300 ANTITHROMBIN III ACTIVITY: CPT | Performed by: STUDENT IN AN ORGANIZED HEALTH CARE EDUCATION/TRAINING PROGRAM

## 2019-09-16 PROCEDURE — 84100 ASSAY OF PHOSPHORUS: CPT | Performed by: STUDENT IN AN ORGANIZED HEALTH CARE EDUCATION/TRAINING PROGRAM

## 2019-09-16 PROCEDURE — 83605 ASSAY OF LACTIC ACID: CPT | Performed by: STUDENT IN AN ORGANIZED HEALTH CARE EDUCATION/TRAINING PROGRAM

## 2019-09-16 PROCEDURE — 99291 CRITICAL CARE FIRST HOUR: CPT | Mod: GC | Performed by: INTERNAL MEDICINE

## 2019-09-16 PROCEDURE — 27211402 ZZH SENSOR NIRS OXIMETER, ADULT

## 2019-09-16 PROCEDURE — 94640 AIRWAY INHALATION TREATMENT: CPT | Mod: 76

## 2019-09-16 PROCEDURE — 83615 LACTATE (LD) (LDH) ENZYME: CPT | Performed by: STUDENT IN AN ORGANIZED HEALTH CARE EDUCATION/TRAINING PROGRAM

## 2019-09-16 RX ORDER — PIPERACILLIN SODIUM, TAZOBACTAM SODIUM 3; .375 G/15ML; G/15ML
3.38 INJECTION, POWDER, LYOPHILIZED, FOR SOLUTION INTRAVENOUS ONCE
Status: COMPLETED | OUTPATIENT
Start: 2019-09-16 | End: 2019-09-16

## 2019-09-16 RX ORDER — CEFAZOLIN SODIUM 2 G/100ML
2 INJECTION, SOLUTION INTRAVENOUS EVERY 8 HOURS
Status: DISCONTINUED | OUTPATIENT
Start: 2019-09-16 | End: 2019-09-17

## 2019-09-16 RX ORDER — FUROSEMIDE 10 MG/ML
60 INJECTION INTRAMUSCULAR; INTRAVENOUS EVERY 8 HOURS
Status: DISCONTINUED | OUTPATIENT
Start: 2019-09-16 | End: 2019-09-17

## 2019-09-16 RX ORDER — FUROSEMIDE 10 MG/ML
80 INJECTION INTRAMUSCULAR; INTRAVENOUS EVERY 8 HOURS
Status: DISCONTINUED | OUTPATIENT
Start: 2019-09-16 | End: 2019-09-16

## 2019-09-16 RX ORDER — BISACODYL 10 MG
10 SUPPOSITORY, RECTAL RECTAL ONCE
Status: COMPLETED | OUTPATIENT
Start: 2019-09-16 | End: 2019-09-16

## 2019-09-16 RX ADMIN — FUROSEMIDE 80 MG: 10 INJECTION, SOLUTION INTRAVENOUS at 01:46

## 2019-09-16 RX ADMIN — PIPERACILLIN SODIUM AND TAZOBACTAM SODIUM 3.38 G: 3; .375 INJECTION, POWDER, LYOPHILIZED, FOR SOLUTION INTRAVENOUS at 13:47

## 2019-09-16 RX ADMIN — MIDAZOLAM HYDROCHLORIDE 2 MG: 2 INJECTION, SOLUTION INTRAMUSCULAR; INTRAVENOUS at 08:30

## 2019-09-16 RX ADMIN — MULTIPLE VITAMINS W/ MINERALS TAB 1 TABLET: TAB at 07:28

## 2019-09-16 RX ADMIN — MIDAZOLAM 6 MG/HR: 5 INJECTION INTRAMUSCULAR; INTRAVENOUS at 09:08

## 2019-09-16 RX ADMIN — DEXMEDETOMIDINE 1 MCG/KG/HR: 100 INJECTION, SOLUTION, CONCENTRATE INTRAVENOUS at 12:30

## 2019-09-16 RX ADMIN — PANTOPRAZOLE SODIUM 40 MG: 40 INJECTION, POWDER, FOR SOLUTION INTRAVENOUS at 20:25

## 2019-09-16 RX ADMIN — HEPARIN SODIUM (PORCINE) LOCK FLUSH IV SOLN 100 UNIT/ML 4000 UNITS: 100 SOLUTION at 06:34

## 2019-09-16 RX ADMIN — IPRATROPIUM BROMIDE AND ALBUTEROL SULFATE 3 ML: .5; 3 SOLUTION RESPIRATORY (INHALATION) at 17:01

## 2019-09-16 RX ADMIN — Medication 2 G: at 06:27

## 2019-09-16 RX ADMIN — MIDAZOLAM 6 MG/HR: 5 INJECTION INTRAMUSCULAR; INTRAVENOUS at 22:41

## 2019-09-16 RX ADMIN — HEPARIN SODIUM 3100 UNITS/HR: 10000 INJECTION, SOLUTION INTRAVENOUS at 17:25

## 2019-09-16 RX ADMIN — HEPARIN SODIUM (PORCINE) LOCK FLUSH IV SOLN 100 UNIT/ML 4000 UNITS: 100 SOLUTION at 17:25

## 2019-09-16 RX ADMIN — DEXMEDETOMIDINE 1 MCG/KG/HR: 100 INJECTION, SOLUTION, CONCENTRATE INTRAVENOUS at 07:28

## 2019-09-16 RX ADMIN — IPRATROPIUM BROMIDE AND ALBUTEROL SULFATE 3 ML: .5; 3 SOLUTION RESPIRATORY (INHALATION) at 09:18

## 2019-09-16 RX ADMIN — IPRATROPIUM BROMIDE AND ALBUTEROL SULFATE 3 ML: .5; 3 SOLUTION RESPIRATORY (INHALATION) at 20:01

## 2019-09-16 RX ADMIN — HEPARIN SODIUM 2950 UNITS/HR: 10000 INJECTION, SOLUTION INTRAVENOUS at 09:43

## 2019-09-16 RX ADMIN — PIPERACILLIN SODIUM AND TAZOBACTAM SODIUM 3.38 G: 3; .375 INJECTION, POWDER, LYOPHILIZED, FOR SOLUTION INTRAVENOUS at 07:29

## 2019-09-16 RX ADMIN — HEPARIN SODIUM 2950 UNITS/HR: 10000 INJECTION, SOLUTION INTRAVENOUS at 02:15

## 2019-09-16 RX ADMIN — ALBUMIN HUMAN 12.5 G: 0.05 INJECTION, SOLUTION INTRAVENOUS at 11:00

## 2019-09-16 RX ADMIN — FUROSEMIDE 60 MG: 10 INJECTION, SOLUTION INTRAVENOUS at 23:00

## 2019-09-16 RX ADMIN — DEXMEDETOMIDINE 1 MCG/KG/HR: 100 INJECTION, SOLUTION, CONCENTRATE INTRAVENOUS at 20:07

## 2019-09-16 RX ADMIN — PIPERACILLIN SODIUM AND TAZOBACTAM SODIUM 3.38 G: 3; .375 INJECTION, POWDER, LYOPHILIZED, FOR SOLUTION INTRAVENOUS at 20:07

## 2019-09-16 RX ADMIN — POTASSIUM CHLORIDE 20 MEQ: 29.8 INJECTION, SOLUTION INTRAVENOUS at 17:58

## 2019-09-16 RX ADMIN — POTASSIUM CHLORIDE 20 MEQ: 29.8 INJECTION, SOLUTION INTRAVENOUS at 22:00

## 2019-09-16 RX ADMIN — FUROSEMIDE 80 MG: 10 INJECTION, SOLUTION INTRAVENOUS at 07:28

## 2019-09-16 RX ADMIN — DEXMEDETOMIDINE 1 MCG/KG/HR: 100 INJECTION, SOLUTION, CONCENTRATE INTRAVENOUS at 02:15

## 2019-09-16 RX ADMIN — DEXMEDETOMIDINE 1 MCG/KG/HR: 100 INJECTION, SOLUTION, CONCENTRATE INTRAVENOUS at 22:41

## 2019-09-16 RX ADMIN — FENTANYL CITRATE 50 MCG: 50 INJECTION, SOLUTION INTRAMUSCULAR; INTRAVENOUS at 11:30

## 2019-09-16 RX ADMIN — FUROSEMIDE 80 MG: 10 INJECTION, SOLUTION INTRAVENOUS at 17:05

## 2019-09-16 RX ADMIN — SENNOSIDES AND DOCUSATE SODIUM 2 TABLET: 8.6; 5 TABLET ORAL at 20:25

## 2019-09-16 RX ADMIN — POTASSIUM CHLORIDE 20 MEQ: 29.8 INJECTION, SOLUTION INTRAVENOUS at 10:54

## 2019-09-16 RX ADMIN — IPRATROPIUM BROMIDE AND ALBUTEROL SULFATE 3 ML: .5; 3 SOLUTION RESPIRATORY (INHALATION) at 12:42

## 2019-09-16 RX ADMIN — DEXMEDETOMIDINE 1 MCG/KG/HR: 100 INJECTION, SOLUTION, CONCENTRATE INTRAVENOUS at 17:05

## 2019-09-16 RX ADMIN — POTASSIUM CHLORIDE 20 MEQ: 29.8 INJECTION, SOLUTION INTRAVENOUS at 04:42

## 2019-09-16 RX ADMIN — POLYETHYLENE GLYCOL 3350 17 G: 17 POWDER, FOR SOLUTION ORAL at 07:28

## 2019-09-16 RX ADMIN — DEXMEDETOMIDINE 1 MCG/KG/HR: 100 INJECTION, SOLUTION, CONCENTRATE INTRAVENOUS at 04:55

## 2019-09-16 RX ADMIN — BISACODYL 10 MG: 10 SUPPOSITORY RECTAL at 17:05

## 2019-09-16 RX ADMIN — VANCOMYCIN HYDROCHLORIDE 2750 MG: 10 INJECTION, POWDER, LYOPHILIZED, FOR SOLUTION INTRAVENOUS at 00:01

## 2019-09-16 RX ADMIN — Medication 100 MCG/HR: at 20:46

## 2019-09-16 RX ADMIN — VANCOMYCIN HYDROCHLORIDE 3000 MG: 1 INJECTION, POWDER, LYOPHILIZED, FOR SOLUTION INTRAVENOUS at 23:01

## 2019-09-16 RX ADMIN — HEPARIN SODIUM 3400 UNITS/HR: 10000 INJECTION, SOLUTION INTRAVENOUS at 23:43

## 2019-09-16 RX ADMIN — POLYETHYLENE GLYCOL 3350 17 G: 17 POWDER, FOR SOLUTION ORAL at 20:25

## 2019-09-16 RX ADMIN — PIPERACILLIN SODIUM AND TAZOBACTAM SODIUM 3.38 G: 3; .375 INJECTION, POWDER, LYOPHILIZED, FOR SOLUTION INTRAVENOUS at 01:47

## 2019-09-16 RX ADMIN — Medication: at 23:12

## 2019-09-16 RX ADMIN — CEFAZOLIN SODIUM 2 G: 2 INJECTION, SOLUTION INTRAVENOUS at 22:48

## 2019-09-16 RX ADMIN — DEXMEDETOMIDINE 1 MCG/KG/HR: 100 INJECTION, SOLUTION, CONCENTRATE INTRAVENOUS at 14:35

## 2019-09-16 RX ADMIN — THIAMINE HCL TAB 100 MG 100 MG: 100 TAB at 07:28

## 2019-09-16 RX ADMIN — PANTOPRAZOLE SODIUM 40 MG: 40 INJECTION, POWDER, FOR SOLUTION INTRAVENOUS at 07:28

## 2019-09-16 ASSESSMENT — MIFFLIN-ST. JEOR: SCORE: 2416.5

## 2019-09-16 ASSESSMENT — ACTIVITIES OF DAILY LIVING (ADL)
ADLS_ACUITY_SCORE: 19

## 2019-09-16 NOTE — PROGRESS NOTES
Cardiology Progress Note    Assessment & Plan      59 year old female with a history of hypertension, hyperlipidemia, sleep apnea, morbid obesity, and chronic bronchitis who presents to the emergency department via EMS for evaluation of respiratory distress s/p intubation and PEA arrest with CTA consistent with bilateral PE transferred here for VA ECMO thrombectomy and catheter directed lytics on 9/9.    Plan for today  - de cannulation  - wean sedation as able  - continue aggressive diuresis  - abx for pneumonia     Neurology: Intubated, sedated  --continue versed, fentanyl - hypotensive with propofol now on versed need to minimize use given difficulty with weaning sedation   - initial CTH no acute changes, moving when sedation is weaned down and following commands    Cardiovascular / Hemodynamics: PEA arrest secondary to massive PE.   CTA-PE study demonstrates bilateral distal main and proximal lobar pulmonary emboli, greater on the right with elevated RV/LV ratio. Peripheral V-A ECMO inserted for hemodynamic support for massive PE. Went to cath lab and then to IR for suction mechanical thrombectomy.  LA 8.6   TTE: dilated RV, severely reduced. Turn down 9/16 LVEF 45-55%, RV function mildly reduced and trivial pericardial effusion stable gases.   EKG: sinus tachy  --ACT goal 180-200  - on dobutamine at 2.5   Pulmonary: PEA arrest secondary to massive PE. See above.   Ventilation Mode: CMV/AC  (Continuous Mandatory Ventilation/ Assist Control)  FiO2 (%): 50 %  Rate Set (breaths/minute): 12 breaths/min  Tidal Volume Set (mL): 450 mL  PEEP (cm H2O): 8 cmH2O  Oxygen Concentration (%): 50 %  Resp: 22  ETT in appropriate place.   -s/p thrombectomy and catheter directed lytics 9/9-9/10  Chest CTSubpleural groundglass attenuation in the right lung, possibly  pulmonary infarcts given previously seen extensive pulmonary emboli.   Patchy nodularity in the right base suggesting aspiration.  - Now weaning vent requirements.  CXR showing white out of left lung- bronched without mucous plugs, pulmonary edema. CT chest showed L pleural effusion increased R pleural effusion and some concern for tracheomalacia?  CXR: Lines in stable position.   --- scheduled duonebs   -- aggressive diuresis - lasix 80mg q6h   GI and Nutrition: No known medical hx.    --monitor BID LFTs  - nutrition consult   --GI Prophylaxis: PPI    Renal, Fluid and Electrolytes: Cr at baseline  UOP continue to monitor.  - volume overloaded will increase diuresis with 80mg q6h  --maintain K>3 and Mg>2    Infectious Disease: Aspiration pneumonia -- sputum culture MSSA  --vancomycin/zosyn x5 days for ECMO, vanc last dose 9/15, will continue zosyn for 10 days   Hematology and Oncology: Receiving heparin for ECMO and  PE.   --cryo PRN fibrinogen < 200; FFP for INR >2  --Transfuse for Hgb<10  --heparin gtt for ECMO with ACT goal 160-180  --US LE w/ arterial duplex per ECMO protocol   --DVT PPX: Heparin as above  - course complicated by DIC with fibrinogen of 82 on 9/10 got 5 unit(s) of cryo   Endocrinology: No known medical history. BG elevated.  --insulin gtt   Lines: R femoral arterial and venous ECMO cannulae September 9, 2019  L femoral arterial line September 9, 2019  R radial arterial line September 9, 2019  ETT September 9, 2019  Underwood catheter September 9, 2019  OG tube September 9, 2019  Restraint: needed    Current lines are required for patient management       Family update by me today: Yes      Code Status:     The pt was discussed and evaluated with Dr. Tk Chilel,     Interval History   Stable overnight  Yesterday evening went up on versed to 6 because she started moving sitting up  Net negative 2.2L yesterfday 1.9 L since midnight stable SCr 0.97 BUN 33  coags are stable     Ventilation Mode: CMV/AC  (Continuous Mandatory Ventilation/ Assist Control)  FiO2 (%): 50 %  Rate Set (breaths/minute): 12 breaths/min  Tidal Volume Set (mL): 450  "mL  PEEP (cm H2O): 8 cmH2O  Oxygen Concentration (%): 50 %  Resp: 22        Physical Exam   Temp: 99.1  F (37.3  C) Temp src: Esophageal     Heart Rate: 73 Resp: 22 SpO2: 98 %(Simultaneous filing. User may not have seen previous data.) O2 Device: Mechanical Ventilator    Vitals:    09/14/19 0600 09/15/19 0400 09/16/19 0200   Weight: (!) 186 kg (410 lb 0.9 oz) (!) 180.2 kg (397 lb 4.3 oz) (!) 182 kg (401 lb 3.8 oz)     Vital Signs with Ranges  Temp:  [96.4  F (35.8  C)-99.9  F (37.7  C)] 99.1  F (37.3  C)  Heart Rate:  [59-96] 73  Resp:  [16-29] 22  MAP:  [65 mmHg-259 mmHg] 75 mmHg  Arterial Line BP: (100-267)/() 115/57  FiO2 (%):  [50 %-70 %] 50 %  SpO2:  [93 %-100 %] 98 %  I/O last 3 completed shifts:  In: 7458.94 [I.V.:3403.94; NG/GT:2495]  Out: 74935 [Urine:52376; Emesis/NG output:900]    Heart Rate: 73, Blood pressure 130/68, pulse 86, temperature 99.1  F (37.3  C), resp. rate 22, height 1.676 m (5' 5.98\"), weight (!) 182 kg (401 lb 3.8 oz), SpO2 98 %, not currently breastfeeding.  401 lbs 3.8 oz  GEN:  Morbidly obese sedated and intubated  CV:  Distant heart sounds   LUNGS:  Decreased breath sounds  ABD:  Active bowel sounds, soft, non-tender/non-distended.  No rebound/guarding/rigidity.  EXT:  No edema or cyanosis.  ECMO canula in place with only scant oozing   Underwood in place    Medications     dexmedetomidine 1 mcg/kg/hr (09/16/19 0728)     IV fluid REPLACEMENT ONLY       IV fluid REPLACEMENT ONLY       DOBUTamine 2.5 mcg/kg/min (09/16/19 0700)     fentaNYL 100 mcg/hr (09/16/19 0700)     heparin 2 unit/mL in 0.9% NaCl 3 mL/hr (09/16/19 0700)     HEParin 2,950 Units/hr (09/16/19 0700)     insulin (regular) Stopped (09/14/19 0800)     midazolam 6 mg/hr (09/16/19 0700)     - MEDICATION INSTRUCTIONS -       - MEDICATION INSTRUCTIONS -         artificial tears   Both Eyes Q8H     furosemide  80 mg Intravenous Q6H     ipratropium - albuterol 0.5 mg/2.5 mg/3 mL  3 mL Nebulization 4x daily     multivitamin " w/minerals  1 tablet Oral or Feeding Tube Daily     pantoprazole (PROTONIX) IV  40 mg Intravenous BID     piperacillin-tazobactam  3.375 g Intravenous Q6H     polyethylene glycol  17 g Oral or Feeding Tube BID     senna-docusate  2 tablet Oral or Feeding Tube QPM     sodium chloride (PF)  3 mL Intracatheter Q8H     vancomycin (VANCOCIN) IV  3,000 mg (central catheter) Intravenous Q24H     vitamin B1  100 mg Oral or Feeding Tube Daily       Data   Recent Labs   Lab 09/16/19  0350 09/15/19  2159 09/15/19  1550   WBC 14.4* 16.0* 15.0*   HGB 8.8* 8.9* 8.6*   MCV 93 93 94   PLT 71* 94* 81*   INR 1.22* 1.16* 1.19*    139 141   POTASSIUM 3.6 4.1 4.0   CHLORIDE 104 104 106   CO2 27 28 28   BUN 33* 31* 30   CR 0.97 0.93 0.85   ANIONGAP 7 8 7   MARIA INES 8.8 8.6 8.2*   * 104*  108* 125*  131*   ALBUMIN 2.3* 2.3* 2.2*   PROTTOTAL 5.7* 6.0* 5.6*   BILITOTAL 1.0 1.0 1.0   ALKPHOS 96 93 82   ALT 34 34 31   AST 18 22 18   TROPI 0.017 <0.015 <0.015       Recent Results (from the past 24 hour(s))   XR Chest Port 1 View    Narrative    Exam: XR CHEST PORT 1 VW, 9/16/2019 1:15 AM    Indication: Check endotracheal tube placement and ECLS cannula  placement. DO NOT log-roll patient.  Place film under patient using  patient safety handling process.    Comparison: 9/15/2019    Findings:   Endotracheal tube tip projects over the mid trachea. Gastric tube and  enteric tube course below the field of view. Right-sided central  venous catheter tip projects over the superior cavoatrial junction.  Heart is enlarged. Increased opacities in the right chest. Unchanged  left basilar consolidation versus atelectasis. Bilateral pleural  effusions, left larger than right. No pneumothorax.      Impression    Impression: Unchanged pulmonary edema with bibasilar atelectasis  versus infection and associated pleural effusions.    I have personally reviewed the examination and initial interpretation  and I agree with the findings.    LILIANA  MD HA     Critical Care Services Progress Note:     Shira Rodriguez remains critically ill with acute coronary syndrome, cardiac arrhythmia, hypotension, severe respiratory distress and shock     I personally examined and evaluated the patient today.   The patient s prognosis today is grave  I have evaluated all laboratory values and imaging studies from the past 24 hours.  Key findings and decisions made today included   Plan for today  - de cannulation  - wean sedation as able  - continue aggressive diuresis  - abx for pneumonia   I personally managed the ventilator, sedation, pain control and analgesia, metabolic abnormalities, antibiotic therapy, nutritional status and vasoactive medications.   Consults ongoing and ordered are none  Procedures that will happen today are: none  All treatments were placed at my direction.  I formulated today s plan with the house staff team or resident(s) and agree with the findings and plan in the associated note.       The above plans and care have been discussed with family and all questions and concerns were addressed.     I spent a total of 60 minutes (excluding procedure time) personally providing and directing critical care services at the bedside and on the critical care unit for Shira Rodriguez.        Richard Frey MD

## 2019-09-16 NOTE — PROGRESS NOTES
ECMO Shift Summary:    Patient remains on VA ECMO, all equipment is functioning and alarms are appropriately set. RPM's 3000 with flow range 2.31-2.88 L/min. Sweep gas is at 0.5 LPM and FiO2 60%. Circuit remains free of air, clot and fibrin. Cannulas are secure with no bleeding from site. Extremities are warm and well perfused. Suctioned ETT for frothy pink thin secretions.      Vent settings:  Ventilation Mode: CMV/AC  (Continuous Mandatory Ventilation/ Assist Control)  FiO2 (%): 60 %  Rate Set (breaths/minute): 12 breaths/min  Tidal Volume Set (mL): 450 mL  PEEP (cm H2O): 8 cmH2O  Oxygen Concentration (%): 50 %  Resp: 20  .    Heparin is running at 3100 u/hr, ACT range 180-200.    Urine output is 2400ml since 0700, blood loss was minimal. Product given included 250ml of 5% albumin.      Intake/Output Summary (Last 24 hours) at 2019 1317  Last data filed at 2019 1300  Gross per 24 hour   Intake 6926.37 ml   Output 30280 ml   Net -5023.63 ml       ECHO:  No results found for this or any previous visit.  Results for orders placed in visit on 18   Echocardiogram Complete    Narrative [id number]  ECH19  [id number]  [id number]^CARRINGTON^VINOD^WILLIS           [facility]  Diagnostic and Treamtent-3rd Floor  [address] [place] [address]     Name: XAVIER ROWELL  MRN: [id number]  : 1960  Study Date: 2018 05:59 PM  Age: 58 yrs  Gender: Female  Patient Location: [facility] [place]  Reason For Study: Cough, Edema extremities  Ordering Physician: VINOD SHULTZ  Referring Physician: VINOD SHULTZ  Performed By: Ryley Ridley RDCS     BSA: 2.7 m2  Height: 66 in  Weight: 418 lb  HR: 85  BP: 148/77 mmHg  _____________________________________________________________________________  __        Procedure  Echocardiogram with two-dimensional, color and spectral Doppler  performed.  _____________________________________________________________________________  __        Interpretation Summary  Moderate concentric wall thickening consistent with left ventricular  hypertrophy is present.  Biventricular size and systolic function is normal.The LVEF is 55-60%.  No significant valve disease.  Inferior vena cava appers dilated measuring 2.4 cm  _____________________________________________________________________________  __        Left Ventricle  Left ventricular size is normal. Left ventricular function is normal.The EF is  55-60%. Moderate concentric wall thickening consistent with left ventricular  hypertrophy is present. Left ventricular diastolic function is indeterminate.  Regional wall motion is probably normal. Endocardial border definition  suboptimal and contrast not given.     Right Ventricle  The right ventricle is normal size. Global right ventricular function is  normal.     Atria  Both atria appear normal.     Mitral Valve  Mild mitral annular calcification is present. Trace mitral insufficiency is  present.        Aortic Valve  Mild aortic valve sclerosis is present. No aortic regurgitation is present.  Transvalvular Doppler is normal and without signs of signifcant stenosis.     Tricuspid Valve  The peak velocity of the tricuspid regurgitant jet is not obtainable.  Pulmonary artery systolic pressure cannot be assessed.     Pulmonic Valve  The pulmonic valve cannot be assessed.     Vessels  The aorta root is normal. Inferior vena cava appers dilated measuring 2.4 cm.  Ascending aorta 3.3 cm.     Pericardium  No pericardial effusion is present.     _____________________________________________________________________________  __  MMode/2D Measurements & Calculations  IVSd: 1.5 cm  LVIDd: 5.4 cm  LVIDs: 3.7 cm  LVPWd: 1.3 cm  FS: 30.5 %  LV mass(C)d: 323.5 grams  LV mass(C)dI: 118.2 grams/m2     asc Aorta Diam: 3.3 cm  LVOT diam: 2.2 cm  LVOT area: 4.0 cm2  LA Volume (BP):  71.6 ml  LA Volume Index (BP): 26.1 ml/m2  RWT: 0.47        Doppler Measurements & Calculations  MV E max emile: 67.4 cm/sec  MV A max emile: 88.8 cm/sec  MV E/A: 0.76  MV dec time: 0.17 sec  Ao V2 max: 203.2 cm/sec  Ao max P.0 mmHg  MERON(V,D): 2.3 cm2     LV V1 max P.5 mmHg  LV V1 max: 116.8 cm/sec  AV Emile Ratio (DI): 0.57  E/E' av.0  Lateral E/e': 13.4  Medial E/e': 12.6     _____________________________________________________________________________  __           Report approved by: Tariq Pappas 2018 10:50 AM          CXR:  Recent Results (from the past 24 hour(s))   XR Chest Port 1 View    Narrative    Exam: XR CHEST PORT 1 VW, 2019 1:15 AM    Indication: Check endotracheal tube placement and ECLS cannula  placement. DO NOT log-roll patient.  Place film under patient using  patient safety handling process.    Comparison: 9/15/2019    Findings:   Endotracheal tube tip projects over the mid trachea. Gastric tube and  enteric tube course below the field of view. Right-sided central  venous catheter tip projects over the superior cavoatrial junction.  Heart is enlarged. Increased opacities in the right chest. Unchanged  left basilar consolidation versus atelectasis. Bilateral pleural  effusions, left larger than right. No pneumothorax.      Impression    Impression: Unchanged pulmonary edema with bibasilar atelectasis  versus infection and associated pleural effusions.    I have personally reviewed the examination and initial interpretation  and I agree with the findings.    LILIANA BONNER MD       Labs:  Recent Labs   Lab 19  1146 19  0952 19  0754 19  0556   PH 7.44 7.42 7.43 7.43   PCO2 47* 48* 46* 45   PO2 84 86 74* 97   HCO3 32* 31* 30* 29*   O2PER 60 60 50.0 70.0       Lab Results   Component Value Date    HGB 8.9 (L) 2019    PHGB <30 2019    PLT 99 (L) 2019    FIBR 871 (H) 2019    INR 1.22 (H) 2019    PTT 83 (H)  09/16/2019    DD 9.8 (H) 09/16/2019    AXA 0.34 09/16/2019    ANTCH 57 (L) 09/16/2019         Plan is decannulation this afternoon.      Sharan Lopez, RT, RRT  9/16/2019 1:17 PM

## 2019-09-16 NOTE — PROGRESS NOTES
ECMO Shift Summary:    Patient remains on VA ECMO, all equipment is functioning and alarms are appropriately set. RPM's 3000 with flow range 2.7 L/min. Sweep gas is at 0.5 LPM and FiO2 60%. Circuit remains free of air, clot and fibrin. Cannulas are secure with no bleeding from site. Extremities are warm. Suctioned ETT for moderate amounts of blood tinged froth.    Significant Shift Events: none    Vent settings:  Ventilation Mode: CMV/AC  (Continuous Mandatory Ventilation/ Assist Control)  FiO2 (%): 50 %  Rate Set (breaths/minute): 12 breaths/min  Tidal Volume Set (mL): 450 mL  PEEP (cm H2O): 8 cmH2O  Oxygen Concentration (%): 50 %  Resp: 21  .    Heparin is running at 2950 u/hr, ACT range 167.    Urine output is as charted, blood loss was minimal. Product given included none.      Intake/Output Summary (Last 24 hours) at 2019 0521  Last data filed at 2019 0500  Gross per 24 hour   Intake 7769.14 ml   Output 55629 ml   Net -4985.86 ml       ECHO:  No results found for this or any previous visit.  Results for orders placed in visit on 18   Echocardiogram Complete    Narrative [id number]  [id number]  [id number]  [id number]^CARRINGTON^VINOD^WILLIS           [facility]  Diagnostic and Treamtent-3rd Floor  [address] [place] [id number]     Name: XAVIER ROWELL  MRN: [id number]  : 1960  Study Date: 2018 05:59 PM  Age: 58 yrs  Gender: Female  Patient Location: [facility] [place]  Reason For Study: Cough, Edema extremities  Ordering Physician: VINOD SHULTZ  Referring Physician: VINOD SHULTZ  Performed By: Ryley Ridley RDCS     BSA: 2.7 m2  Height: 66 in  Weight: 418 lb  HR: 85  BP: 148/77 mmHg  _____________________________________________________________________________  __        Procedure  Echocardiogram with two-dimensional, color and spectral Doppler performed.  _____________________________________________________________________________  __         Interpretation Summary  Moderate concentric wall thickening consistent with left ventricular  hypertrophy is present.  Biventricular size and systolic function is normal.The LVEF is 55-60%.  No significant valve disease.  Inferior vena cava appers dilated measuring 2.4 cm  _____________________________________________________________________________  __        Left Ventricle  Left ventricular size is normal. Left ventricular function is normal.The EF is  55-60%. Moderate concentric wall thickening consistent with left ventricular  hypertrophy is present. Left ventricular diastolic function is indeterminate.  Regional wall motion is probably normal. Endocardial border definition  suboptimal and contrast not given.     Right Ventricle  The right ventricle is normal size. Global right ventricular function is  normal.     Atria  Both atria appear normal.     Mitral Valve  Mild mitral annular calcification is present. Trace mitral insufficiency is  present.        Aortic Valve  Mild aortic valve sclerosis is present. No aortic regurgitation is present.  Transvalvular Doppler is normal and without signs of signifcant stenosis.     Tricuspid Valve  The peak velocity of the tricuspid regurgitant jet is not obtainable.  Pulmonary artery systolic pressure cannot be assessed.     Pulmonic Valve  The pulmonic valve cannot be assessed.     Vessels  The aorta root is normal. Inferior vena cava appers dilated measuring 2.4 cm.  Ascending aorta 3.3 cm.     Pericardium  No pericardial effusion is present.     _____________________________________________________________________________  __  MMode/2D Measurements & Calculations  IVSd: 1.5 cm  LVIDd: 5.4 cm  LVIDs: 3.7 cm  LVPWd: 1.3 cm  FS: 30.5 %  LV mass(C)d: 323.5 grams  LV mass(C)dI: 118.2 grams/m2     asc Aorta Diam: 3.3 cm  LVOT diam: 2.2 cm  LVOT area: 4.0 cm2  LA Volume (BP): 71.6 ml  LA Volume Index (BP): 26.1 ml/m2  RWT: 0.47        Doppler Measurements &  Calculations  MV E max emile: 67.4 cm/sec  MV A max emile: 88.8 cm/sec  MV E/A: 0.76  MV dec time: 0.17 sec  Ao V2 max: 203.2 cm/sec  Ao max P.0 mmHg  MERON(V,D): 2.3 cm2     LV V1 max P.5 mmHg  LV V1 max: 116.8 cm/sec  AV Emile Ratio (DI): 0.57  E/E' av.0  Lateral E/e': 13.4  Medial E/e': 12.6     _____________________________________________________________________________  __           Report approved by: Tariq Pappas 2018 10:50 AM          CXR:  Recent Results (from the past 24 hour(s))   XR Chest Port 1 View    Narrative    Exam: XR CHEST PORT 1 VW, 9/15/2019 6:37 AM    Indication: Check endotracheal tube placement and ECLS cannula  placement.     Comparison: CT 2019, chest x-ray 2019    Findings:   Patient is rotated. Endotracheal tube tip projects over the mid  trachea. Gastric tube and enteric tube course below the field of view.  Right-sided central venous catheter tip projects over the superior  cavoatrial junction. Heart is enlarged. Increased opacities in the  right chest. Improved aeration of the left upper lung, with persistent  left basilar consolidation versus atelectasis. Bilateral pleural  effusions, left larger than right. No pneumothorax.      Impression    Impression: Increased opacities in the right lung. Improved aeration  of the left upper lung with persistent left basilar consolidation  versus atelectasis. Bilateral pleural effusions, left greater than  right.    JUSTEN CANO MD       Labs:  Recent Labs   Lab 19  0431 19  0350 19  0208 19  0000   PH 7.43 7.45 7.43 7.43   PCO2 44 43 42 42   PO2 90 50* 66* 73*   HCO3 29* 30* 28 28   O2PER 70.0 50.0 50.0 50.0       Lab Results   Component Value Date    HGB 8.8 (L) 2019    PHGB <30 09/15/2019    PLT 71 (L) 2019    FIBR 871 (H) 2019    INR 1.22 (H) 2019    PTT 69 (H) 2019    DD 9.8 (H) 2019    AXA 0.28 2019    ANTCH 49 (L) 09/15/2019         Plan  is to decannulate Monday as an add on.      Joanna Han, RT, RRT  9/16/2019 5:21 AM

## 2019-09-16 NOTE — PLAN OF CARE
Moves all extremities spontaneously, does not follow commands. HR SR with occasional PVCs. ECMO sweep, RPM, flows remain stable. FiO2 at 60%, PaO2s on ABGs 70-80s. Frequent coughing, small-mod amount of thin frothy red/pink secretions. Scheduled lasix switched by MD from q6h to q8h due to chugging on ECMO circuit this morning. 250 mL albumin given, chugging resolved after. 2L net negative today. Suppository ordered this afternoon, no results yet. Anticipate ECMO decannulation tomorrow once OR/surgeon available, currently unscheduled. Pt family updated on POC/pt status.

## 2019-09-16 NOTE — PHARMACY-VANCOMYCIN DOSING SERVICE
Pharmacy Vancomycin Note  Date of Service September 15, 2019  Patient's  1960   59 year old, female    Indication: Aspiration Pneumonia  Goal Trough Level: 15-20 mg/L  Day of Therapy: 6  Current Vancomycin regimen:  2750 mg IV q24h    Current estimated CrCl = Estimated Creatinine Clearance: 122.7 mL/min (based on SCr of 0.85 mg/dL).    Creatinine for last 3 days  2019:  3:59 AM Creatinine 1.13 mg/dL; 10:02 AM Creatinine 1.08 mg/dL;  3:42 PM Creatinine 1.03 mg/dL; 10:10 PM Creatinine 0.99 mg/dL  2019:  3:45 AM Creatinine 0.98 mg/dL;  9:52 AM Creatinine 0.88 mg/dL;  3:57 PM Creatinine 0.89 mg/dL; 10:11 PM Creatinine 0.85 mg/dL  9/15/2019:  3:50 AM Creatinine 0.85 mg/dL; 10:24 AM Creatinine 0.85 mg/dL;  3:50 PM Creatinine 0.85 mg/dL    Recent Vancomycin Levels (past 3 days)  2019:  8:01 PM Vancomycin Level 13.6 mg/L  9/15/2019:  9:07 PM Vancomycin Level 13.8 mg/L    Vancomycin IV Administrations (past 72 hours)                   vancomycin (VANCOCIN) 2,750 mg in sodium chloride 0.9 % 250 mL intermittent infusion (mg) 2,750 mg New Bag 19     2,750 mg New Bag 19 213                Nephrotoxins and other renal medications (From now, onward)    Start     Dose/Rate Route Frequency Ordered Stop    19 2300  vancomycin (VANCOCIN) 3,000 mg in sodium chloride 0.9 % 250 mL intermittent infusion      3,000 mg (central catheter)  over 180 Minutes Intravenous EVERY 24 HOURS 09/15/19 2223      09/15/19 1930  furosemide (LASIX) injection 80 mg      80 mg  over 1-2 Minutes Intravenous EVERY 6 HOURS 09/15/19 1613      09/15/19 1700  DOBUTamine (DOBUTREX) 1,000 mg in D5W 250 mL infusion (adult max conc)      2.5 mcg/kg/min × 178.6 kg (Dosing Weight)  6.7 mL/hr  Intravenous CONTINUOUS 09/15/19 1547      09/15/19 0800  piperacillin-tazobactam (ZOSYN) 3.375 g vial to attach to  mL bag      3.375 g  over 30 Minutes Intravenous EVERY 6 HOURS 09/15/19 0741 19 2552             Contrast  Orders - past 72 hours (72h ago, onward)    None          Interpretation of levels and current regimen:  Trough level is  Subtherapeutic    Has serum creatinine changed > 50% in last 72 hours: No    Urine output:  good urine output    Renal Function: Stable    Plan:  1.  Increase Dose to 3000 mg IV Q24H  2.  Pharmacy will check trough levels as appropriate in 1-3 Days.    3. Serum creatinine levels will be ordered daily for the first week of therapy and at least twice weekly for subsequent weeks.      Heather Palacios RPH        .

## 2019-09-16 NOTE — PLAN OF CARE
Neuro- Unchanged; responds to voice. Localizes pain. PERRL. Fentanyl for pain, dex and midazolam for sedation.  CV- SR 70s. MAPs>65. Dobutamine 2.5mcg/kg/min. ECMO flows 2.8-2.9, 3000 RPMs, sweep 0.5. Plan for possible decannulation today.   Pulm-Remains on CMV settings. Suctioning moderate-large amounts of thick, frothy secretions. Monitoring ABGs Q2 hours, as ordered; O2 titrated per same.  GI- Hypoactive BS. Bowel protocol meds given, as ordered.  - Adequate UOP this shift.  Skin- Repositioned Q2 hours; tolerated same well.  See flow sheets for further interventions and assessments. Continuing to monitor.      Problem: Pulmonary Impairment (Pulmonary Impairment)  Goal: Pulmonary Impairment Goal: Optimal Level of Pulmonary Function  Outcome: No Change     Problem: Adjustment to Therapy (Extracorporeal Life Support/ECMO)  Goal: Optimal Adjustment to Therapy  Outcome: No Change     Problem: Bleeding (Extracorporeal Life Support/ECMO)  Goal: Absence of Bleeding  Outcome: No Change     Problem: Cardiac Output Decreased (Extracorporeal Life Support/ECMO)  Goal: Effective Cardiac Output  Outcome: No Change     Problem: Device-Related Complication Risk (Extracorporeal Life Support/ECMO)  Goal: Optimal Device Function  Outcome: No Change     Problem: Infection (Extracorporeal Life Support/ECMO)  Goal: Absence of Infection Signs/Symptoms  Outcome: No Change     Problem: Communication Impairment (Artificial Airway)  Goal: Effective Communication  Outcome: No Change     Problem: Device-Related Complication Risk (Artificial Airway)  Goal: Optimal Device Function  Outcome: No Change     Problem: Chronic Respiratory Difficulty Comorbidity  Goal: Chronic Respiratory Difficulty  Description  Patient comorbidity will be monitored for signs and symptoms of Respiratory Difficulty (Chronic) condition.  Problems will be absent, minimized or managed by discharge/transition of care.  Outcome: No Change     Problem: Hypertension  Comorbidity  Goal: Blood Pressure in Desired Range  Outcome: No Change     Problem: Obstructive Sleep Apnea Risk or Actual (Comorbidity Management)  Goal: Unobstructed Breathing During Sleep  Outcome: No Change     Problem: Adult Inpatient Plan of Care  Goal: Plan of Care Review  Outcome: No Change  Goal: Patient-Specific Goal (Individualization)  Outcome: No Change  Goal: Absence of Hospital-Acquired Illness or Injury  Outcome: No Change  Goal: Optimal Comfort and Wellbeing  Outcome: No Change  Goal: Readiness for Transition of Care  Outcome: No Change  Goal: Rounds/Family Conference  Outcome: No Change

## 2019-09-17 ENCOUNTER — APPOINTMENT (OUTPATIENT)
Dept: GENERAL RADIOLOGY | Facility: CLINIC | Age: 59
DRG: 003 | End: 2019-09-17
Payer: COMMERCIAL

## 2019-09-17 ENCOUNTER — SURGERY (OUTPATIENT)
Age: 59
End: 2019-09-17
Payer: COMMERCIAL

## 2019-09-17 ENCOUNTER — ANESTHESIA EVENT (OUTPATIENT)
Dept: SURGERY | Facility: CLINIC | Age: 59
DRG: 003 | End: 2019-09-17
Payer: COMMERCIAL

## 2019-09-17 ENCOUNTER — APPOINTMENT (OUTPATIENT)
Dept: GENERAL RADIOLOGY | Facility: CLINIC | Age: 59
DRG: 003 | End: 2019-09-17
Attending: STUDENT IN AN ORGANIZED HEALTH CARE EDUCATION/TRAINING PROGRAM
Payer: COMMERCIAL

## 2019-09-17 ENCOUNTER — ANESTHESIA (OUTPATIENT)
Dept: SURGERY | Facility: CLINIC | Age: 59
DRG: 003 | End: 2019-09-17
Payer: COMMERCIAL

## 2019-09-17 LAB
ABO + RH BLD: NORMAL
ABO + RH BLD: NORMAL
ALBUMIN SERPL-MCNC: 2.1 G/DL (ref 3.4–5)
ALBUMIN SERPL-MCNC: 2.3 G/DL (ref 3.4–5)
ALP SERPL-CCNC: 82 U/L (ref 40–150)
ALP SERPL-CCNC: 84 U/L (ref 40–150)
ALT SERPL W P-5'-P-CCNC: 24 U/L (ref 0–50)
ALT SERPL W P-5'-P-CCNC: 27 U/L (ref 0–50)
ANION GAP SERPL CALCULATED.3IONS-SCNC: 4 MMOL/L (ref 3–14)
ANION GAP SERPL CALCULATED.3IONS-SCNC: 6 MMOL/L (ref 3–14)
ANION GAP SERPL CALCULATED.3IONS-SCNC: 6 MMOL/L (ref 3–14)
ANION GAP SERPL CALCULATED.3IONS-SCNC: 8 MMOL/L (ref 3–14)
APTT PPP: 102 SEC (ref 22–37)
APTT PPP: 111 SEC (ref 22–37)
APTT PPP: 158 SEC (ref 22–37)
APTT PPP: 88 SEC (ref 22–37)
AST SERPL W P-5'-P-CCNC: 14 U/L (ref 0–45)
AST SERPL W P-5'-P-CCNC: 15 U/L (ref 0–45)
AT III ACT/NOR PPP CHRO: 57 % (ref 85–135)
BACTERIA SPEC CULT: NO GROWTH
BASE EXCESS BLDA CALC-SCNC: 2.3 MMOL/L
BASE EXCESS BLDA CALC-SCNC: 2.4 MMOL/L
BASE EXCESS BLDA CALC-SCNC: 2.8 MMOL/L
BASE EXCESS BLDA CALC-SCNC: 3.5 MMOL/L
BASE EXCESS BLDA CALC-SCNC: 4 MMOL/L
BASE EXCESS BLDA CALC-SCNC: 4.4 MMOL/L
BASE EXCESS BLDA CALC-SCNC: 5.2 MMOL/L
BASE EXCESS BLDA CALC-SCNC: 5.4 MMOL/L
BASE EXCESS BLDA CALC-SCNC: 5.4 MMOL/L
BASE EXCESS BLDA CALC-SCNC: 6 MMOL/L
BASE EXCESS BLDA CALC-SCNC: 6.6 MMOL/L
BASE EXCESS BLDA CALC-SCNC: 6.7 MMOL/L
BASE EXCESS BLDA CALC-SCNC: 6.9 MMOL/L
BASE EXCESS BLDV CALC-SCNC: 5.4 MMOL/L
BASE EXCESS BLDV CALC-SCNC: 6.8 MMOL/L
BILIRUB SERPL-MCNC: 0.7 MG/DL (ref 0.2–1.3)
BILIRUB SERPL-MCNC: 0.8 MG/DL (ref 0.2–1.3)
BLD GP AB SCN SERPL QL: NORMAL
BLD PROD TYP BPU: NORMAL
BLD UNIT ID BPU: 0
BLOOD BANK CMNT PATIENT-IMP: NORMAL
BLOOD PRODUCT CODE: NORMAL
BPU ID: NORMAL
BUN SERPL-MCNC: 28 MG/DL (ref 7–30)
BUN SERPL-MCNC: 28 MG/DL (ref 7–30)
BUN SERPL-MCNC: 30 MG/DL (ref 7–30)
BUN SERPL-MCNC: 33 MG/DL (ref 7–30)
CA-I BLD-MCNC: 4.6 MG/DL (ref 4.4–5.2)
CA-I BLD-MCNC: 4.7 MG/DL (ref 4.4–5.2)
CA-I BLD-MCNC: 4.8 MG/DL (ref 4.4–5.2)
CA-I BLD-MCNC: 4.9 MG/DL (ref 4.4–5.2)
CA-I BLD-MCNC: 5 MG/DL (ref 4.4–5.2)
CALCIUM SERPL-MCNC: 8.6 MG/DL (ref 8.5–10.1)
CALCIUM SERPL-MCNC: 8.6 MG/DL (ref 8.5–10.1)
CALCIUM SERPL-MCNC: 8.9 MG/DL (ref 8.5–10.1)
CALCIUM SERPL-MCNC: 8.9 MG/DL (ref 8.5–10.1)
CHLORIDE SERPL-SCNC: 102 MMOL/L (ref 94–109)
CHLORIDE SERPL-SCNC: 102 MMOL/L (ref 94–109)
CHLORIDE SERPL-SCNC: 104 MMOL/L (ref 94–109)
CHLORIDE SERPL-SCNC: 105 MMOL/L (ref 94–109)
CO2 SERPL-SCNC: 27 MMOL/L (ref 20–32)
CO2 SERPL-SCNC: 28 MMOL/L (ref 20–32)
CO2 SERPL-SCNC: 30 MMOL/L (ref 20–32)
CO2 SERPL-SCNC: 30 MMOL/L (ref 20–32)
CREAT SERPL-MCNC: 0.82 MG/DL (ref 0.52–1.04)
CREAT SERPL-MCNC: 0.83 MG/DL (ref 0.52–1.04)
CREAT SERPL-MCNC: 0.85 MG/DL (ref 0.52–1.04)
CREAT SERPL-MCNC: 0.92 MG/DL (ref 0.52–1.04)
CRP SERPL-MCNC: 180 MG/L (ref 0–8)
D DIMER PPP FEU-MCNC: 12.1 UG/ML FEU (ref 0–0.5)
D DIMER PPP FEU-MCNC: 8 UG/ML FEU (ref 0–0.5)
ERYTHROCYTE [DISTWIDTH] IN BLOOD BY AUTOMATED COUNT: 15.9 % (ref 10–15)
ERYTHROCYTE [DISTWIDTH] IN BLOOD BY AUTOMATED COUNT: 16.4 % (ref 10–15)
ERYTHROCYTE [DISTWIDTH] IN BLOOD BY AUTOMATED COUNT: 16.6 % (ref 10–15)
ERYTHROCYTE [DISTWIDTH] IN BLOOD BY AUTOMATED COUNT: 16.7 % (ref 10–15)
ERYTHROCYTE [SEDIMENTATION RATE] IN BLOOD BY WESTERGREN METHOD: 107 MM/H (ref 0–30)
FIBRINOGEN PPP-MCNC: 829 MG/DL (ref 200–420)
FIBRINOGEN PPP-MCNC: 941 MG/DL (ref 200–420)
GFR SERPL CREATININE-BSD FRML MDRD: 68 ML/MIN/{1.73_M2}
GFR SERPL CREATININE-BSD FRML MDRD: 75 ML/MIN/{1.73_M2}
GFR SERPL CREATININE-BSD FRML MDRD: 77 ML/MIN/{1.73_M2}
GFR SERPL CREATININE-BSD FRML MDRD: 78 ML/MIN/{1.73_M2}
GLUCOSE BLD-MCNC: 106 MG/DL (ref 70–99)
GLUCOSE BLD-MCNC: 113 MG/DL (ref 70–99)
GLUCOSE BLD-MCNC: 114 MG/DL (ref 70–99)
GLUCOSE BLD-MCNC: 115 MG/DL (ref 70–99)
GLUCOSE BLD-MCNC: 116 MG/DL (ref 70–99)
GLUCOSE BLD-MCNC: 122 MG/DL (ref 70–99)
GLUCOSE BLD-MCNC: 126 MG/DL (ref 70–99)
GLUCOSE BLD-MCNC: 134 MG/DL (ref 70–99)
GLUCOSE BLD-MCNC: 136 MG/DL (ref 70–99)
GLUCOSE BLD-MCNC: 158 MG/DL (ref 70–99)
GLUCOSE BLD-MCNC: 181 MG/DL (ref 70–99)
GLUCOSE BLDC GLUCOMTR-MCNC: 179 MG/DL (ref 70–99)
GLUCOSE SERPL-MCNC: 111 MG/DL (ref 70–99)
GLUCOSE SERPL-MCNC: 114 MG/DL (ref 70–99)
GLUCOSE SERPL-MCNC: 137 MG/DL (ref 70–99)
GLUCOSE SERPL-MCNC: 173 MG/DL (ref 70–99)
HCO3 BLD-SCNC: 27 MMOL/L (ref 21–28)
HCO3 BLD-SCNC: 28 MMOL/L (ref 21–28)
HCO3 BLD-SCNC: 28 MMOL/L (ref 21–28)
HCO3 BLD-SCNC: 30 MMOL/L (ref 21–28)
HCO3 BLD-SCNC: 31 MMOL/L (ref 21–28)
HCO3 BLD-SCNC: 32 MMOL/L (ref 21–28)
HCO3 BLDA-SCNC: 30 MMOL/L (ref 21–28)
HCO3 BLDA-SCNC: 33 MMOL/L (ref 21–28)
HCO3 BLDV-SCNC: 31 MMOL/L (ref 21–28)
HCO3 BLDV-SCNC: 33 MMOL/L (ref 21–28)
HCT VFR BLD AUTO: 25.4 % (ref 35–47)
HCT VFR BLD AUTO: 26.1 % (ref 35–47)
HCT VFR BLD AUTO: 27.9 % (ref 35–47)
HCT VFR BLD AUTO: 28.3 % (ref 35–47)
HGB BLD-MCNC: 8.1 G/DL (ref 11.7–15.7)
HGB BLD-MCNC: 8.3 G/DL (ref 11.7–15.7)
HGB BLD-MCNC: 8.6 G/DL (ref 11.7–15.7)
HGB BLD-MCNC: 8.8 G/DL (ref 11.7–15.7)
HGB BLD-MCNC: 9.2 G/DL (ref 11.7–15.7)
HGB FREE PLAS-MCNC: 30 MG/DL
INR PPP: 1.2 (ref 0.86–1.14)
INR PPP: 1.24 (ref 0.86–1.14)
INR PPP: 1.26 (ref 0.86–1.14)
INR PPP: 1.27 (ref 0.86–1.14)
LACTATE BLD-SCNC: 0.4 MMOL/L (ref 0.7–2)
LACTATE BLD-SCNC: 0.4 MMOL/L (ref 0.7–2)
LACTATE BLD-SCNC: 0.5 MMOL/L (ref 0.7–2)
LACTATE BLD-SCNC: 0.6 MMOL/L (ref 0.7–2)
LACTATE BLD-SCNC: 0.7 MMOL/L (ref 0.7–2)
LACTATE BLD-SCNC: 0.8 MMOL/L (ref 0.7–2)
LACTATE BLD-SCNC: 0.9 MMOL/L (ref 0.7–2)
LDH SERPL L TO P-CCNC: 395 U/L (ref 81–234)
LMWH PPP CHRO-ACNC: 0.39 IU/ML
LMWH PPP CHRO-ACNC: 0.46 IU/ML
LMWH PPP CHRO-ACNC: 0.48 IU/ML
Lab: NORMAL
MAGNESIUM SERPL-MCNC: 2 MG/DL (ref 1.6–2.3)
MAGNESIUM SERPL-MCNC: 2 MG/DL (ref 1.6–2.3)
MAGNESIUM SERPL-MCNC: 2.6 MG/DL (ref 1.6–2.3)
MAGNESIUM SERPL-MCNC: 2.6 MG/DL (ref 1.6–2.3)
MCH RBC QN AUTO: 27.9 PG (ref 26.5–33)
MCH RBC QN AUTO: 29.2 PG (ref 26.5–33)
MCH RBC QN AUTO: 29.5 PG (ref 26.5–33)
MCH RBC QN AUTO: 29.5 PG (ref 26.5–33)
MCHC RBC AUTO-ENTMCNC: 30.4 G/DL (ref 31.5–36.5)
MCHC RBC AUTO-ENTMCNC: 31.5 G/DL (ref 31.5–36.5)
MCHC RBC AUTO-ENTMCNC: 31.8 G/DL (ref 31.5–36.5)
MCHC RBC AUTO-ENTMCNC: 31.9 G/DL (ref 31.5–36.5)
MCV RBC AUTO: 92 FL (ref 78–100)
MCV RBC AUTO: 92 FL (ref 78–100)
MCV RBC AUTO: 93 FL (ref 78–100)
MCV RBC AUTO: 93 FL (ref 78–100)
NUM BPU REQUESTED: 2
NUM BPU REQUESTED: 6
O2/TOTAL GAS SETTING VFR VENT: 100 %
O2/TOTAL GAS SETTING VFR VENT: 50 %
O2/TOTAL GAS SETTING VFR VENT: 60 %
OXYHGB MFR BLD: 94 % (ref 92–100)
OXYHGB MFR BLD: 95 % (ref 92–100)
OXYHGB MFR BLD: 96 % (ref 92–100)
OXYHGB MFR BLD: 96 % (ref 92–100)
OXYHGB MFR BLD: 97 % (ref 92–100)
OXYHGB MFR BLDA: 97 % (ref 75–100)
OXYHGB MFR BLDA: 98 % (ref 75–100)
OXYHGB MFR BLDV: 72 %
OXYHGB MFR BLDV: 85 %
PCO2 BLD: 42 MM HG (ref 35–45)
PCO2 BLD: 44 MM HG (ref 35–45)
PCO2 BLD: 45 MM HG (ref 35–45)
PCO2 BLD: 46 MM HG (ref 35–45)
PCO2 BLD: 47 MM HG (ref 35–45)
PCO2 BLD: 47 MM HG (ref 35–45)
PCO2 BLD: 48 MM HG (ref 35–45)
PCO2 BLD: 49 MM HG (ref 35–45)
PCO2 BLD: 75 MM HG (ref 35–45)
PCO2 BLDA: 47 MM HG (ref 35–45)
PCO2 BLDA: 57 MM HG (ref 35–45)
PCO2 BLDV: 52 MM HG (ref 40–50)
PCO2 BLDV: 57 MM HG (ref 40–50)
PH BLD: 7.24 PH (ref 7.35–7.45)
PH BLD: 7.39 PH (ref 7.35–7.45)
PH BLD: 7.39 PH (ref 7.35–7.45)
PH BLD: 7.4 PH (ref 7.35–7.45)
PH BLD: 7.41 PH (ref 7.35–7.45)
PH BLD: 7.42 PH (ref 7.35–7.45)
PH BLD: 7.43 PH (ref 7.35–7.45)
PH BLD: 7.44 PH (ref 7.35–7.45)
PH BLD: 7.45 PH (ref 7.35–7.45)
PH BLD: 7.45 PH (ref 7.35–7.45)
PH BLD: 7.46 PH (ref 7.35–7.45)
PH BLDA: 7.38 PH (ref 7.35–7.45)
PH BLDA: 7.41 PH (ref 7.35–7.45)
PH BLDV: 7.37 PH (ref 7.32–7.43)
PH BLDV: 7.38 PH (ref 7.32–7.43)
PHOSPHATE SERPL-MCNC: 3.7 MG/DL (ref 2.5–4.5)
PHOSPHATE SERPL-MCNC: 3.8 MG/DL (ref 2.5–4.5)
PHOSPHATE SERPL-MCNC: 4.3 MG/DL (ref 2.5–4.5)
PHOSPHATE SERPL-MCNC: 4.8 MG/DL (ref 2.5–4.5)
PLATELET # BLD AUTO: 100 10E9/L (ref 150–450)
PLATELET # BLD AUTO: 80 10E9/L (ref 150–450)
PLATELET # BLD AUTO: 91 10E9/L (ref 150–450)
PLATELET # BLD AUTO: 92 10E9/L (ref 150–450)
PO2 BLD: 108 MM HG (ref 80–105)
PO2 BLD: 109 MM HG (ref 80–105)
PO2 BLD: 124 MM HG (ref 80–105)
PO2 BLD: 152 MM HG (ref 80–105)
PO2 BLD: 196 MM HG (ref 80–105)
PO2 BLD: 76 MM HG (ref 80–105)
PO2 BLD: 80 MM HG (ref 80–105)
PO2 BLD: 82 MM HG (ref 80–105)
PO2 BLD: 84 MM HG (ref 80–105)
PO2 BLD: 85 MM HG (ref 80–105)
PO2 BLD: 89 MM HG (ref 80–105)
PO2 BLD: 89 MM HG (ref 80–105)
PO2 BLD: 90 MM HG (ref 80–105)
PO2 BLDA: 236 MM HG (ref 80–105)
PO2 BLDA: 406 MM HG (ref 80–105)
PO2 BLDV: 41 MM HG (ref 25–47)
PO2 BLDV: 55 MM HG (ref 25–47)
POTASSIUM BLD-SCNC: 3.5 MMOL/L (ref 3.4–5.3)
POTASSIUM BLD-SCNC: 3.7 MMOL/L (ref 3.4–5.3)
POTASSIUM BLD-SCNC: 3.7 MMOL/L (ref 3.4–5.3)
POTASSIUM BLD-SCNC: 3.8 MMOL/L (ref 3.4–5.3)
POTASSIUM BLD-SCNC: 4 MMOL/L (ref 3.4–5.3)
POTASSIUM BLD-SCNC: 4.2 MMOL/L (ref 3.4–5.3)
POTASSIUM BLD-SCNC: 4.2 MMOL/L (ref 3.4–5.3)
POTASSIUM BLD-SCNC: 4.3 MMOL/L (ref 3.4–5.3)
POTASSIUM BLD-SCNC: 4.3 MMOL/L (ref 3.4–5.3)
POTASSIUM BLD-SCNC: 4.4 MMOL/L (ref 3.4–5.3)
POTASSIUM SERPL-SCNC: 3.5 MMOL/L (ref 3.4–5.3)
POTASSIUM SERPL-SCNC: 3.7 MMOL/L (ref 3.4–5.3)
POTASSIUM SERPL-SCNC: 3.7 MMOL/L (ref 3.4–5.3)
POTASSIUM SERPL-SCNC: 4.4 MMOL/L (ref 3.4–5.3)
PROT SERPL-MCNC: 5.8 G/DL (ref 6.8–8.8)
PROT SERPL-MCNC: 6.1 G/DL (ref 6.8–8.8)
RBC # BLD AUTO: 2.75 10E12/L (ref 3.8–5.2)
RBC # BLD AUTO: 2.81 10E12/L (ref 3.8–5.2)
RBC # BLD AUTO: 3.01 10E12/L (ref 3.8–5.2)
RBC # BLD AUTO: 3.08 10E12/L (ref 3.8–5.2)
S100 CA BINDING PROTEIN B SER-MCNC: 328 NG/L (ref 0–96)
S100 CA BINDING PROTEIN B SER-MCNC: 418 NG/L (ref 0–96)
S100 CA BINDING PROTEIN B SER-MCNC: 752 NG/L (ref 0–96)
SODIUM BLD-SCNC: 138 MMOL/L (ref 133–144)
SODIUM BLD-SCNC: 139 MMOL/L (ref 133–144)
SODIUM BLD-SCNC: 139 MMOL/L (ref 133–144)
SODIUM SERPL-SCNC: 137 MMOL/L (ref 133–144)
SODIUM SERPL-SCNC: 138 MMOL/L (ref 133–144)
SODIUM SERPL-SCNC: 138 MMOL/L (ref 133–144)
SODIUM SERPL-SCNC: 139 MMOL/L (ref 133–144)
SPECIMEN EXP DATE BLD: NORMAL
SPECIMEN SOURCE: NORMAL
TRANSFUSION STATUS PATIENT QL: NORMAL
TROPONIN I SERPL-MCNC: <0.015 UG/L (ref 0–0.04)
WBC # BLD AUTO: 10.8 10E9/L (ref 4–11)
WBC # BLD AUTO: 13 10E9/L (ref 4–11)
WBC # BLD AUTO: 14.9 10E9/L (ref 4–11)
WBC # BLD AUTO: 17.5 10E9/L (ref 4–11)

## 2019-09-17 PROCEDURE — 20000004 ZZH R&B ICU UMMC

## 2019-09-17 PROCEDURE — 27211402 ZZH SENSOR NIRS OXIMETER, ADULT

## 2019-09-17 PROCEDURE — 82947 ASSAY GLUCOSE BLOOD QUANT: CPT | Performed by: THORACIC SURGERY (CARDIOTHORACIC VASCULAR SURGERY)

## 2019-09-17 PROCEDURE — 82803 BLOOD GASES ANY COMBINATION: CPT

## 2019-09-17 PROCEDURE — 85384 FIBRINOGEN ACTIVITY: CPT | Performed by: STUDENT IN AN ORGANIZED HEALTH CARE EDUCATION/TRAINING PROGRAM

## 2019-09-17 PROCEDURE — 25000125 ZZHC RX 250: Performed by: INTERNAL MEDICINE

## 2019-09-17 PROCEDURE — 00000146 ZZHCL STATISTIC GLUCOSE BY METER IP

## 2019-09-17 PROCEDURE — 37000008 ZZH ANESTHESIA TECHNICAL FEE, 1ST 30 MIN: Performed by: THORACIC SURGERY (CARDIOTHORACIC VASCULAR SURGERY)

## 2019-09-17 PROCEDURE — 83605 ASSAY OF LACTIC ACID: CPT

## 2019-09-17 PROCEDURE — 82805 BLOOD GASES W/O2 SATURATION: CPT | Performed by: STUDENT IN AN ORGANIZED HEALTH CARE EDUCATION/TRAINING PROGRAM

## 2019-09-17 PROCEDURE — 85027 COMPLETE CBC AUTOMATED: CPT | Performed by: STUDENT IN AN ORGANIZED HEALTH CARE EDUCATION/TRAINING PROGRAM

## 2019-09-17 PROCEDURE — 82330 ASSAY OF CALCIUM: CPT | Performed by: INTERNAL MEDICINE

## 2019-09-17 PROCEDURE — 25800030 ZZH RX IP 258 OP 636: Performed by: STUDENT IN AN ORGANIZED HEALTH CARE EDUCATION/TRAINING PROGRAM

## 2019-09-17 PROCEDURE — 84295 ASSAY OF SERUM SODIUM: CPT | Performed by: THORACIC SURGERY (CARDIOTHORACIC VASCULAR SURGERY)

## 2019-09-17 PROCEDURE — 84100 ASSAY OF PHOSPHORUS: CPT | Performed by: SURGERY

## 2019-09-17 PROCEDURE — 84132 ASSAY OF SERUM POTASSIUM: CPT

## 2019-09-17 PROCEDURE — 25800030 ZZH RX IP 258 OP 636: Performed by: INTERNAL MEDICINE

## 2019-09-17 PROCEDURE — 25800030 ZZH RX IP 258 OP 636: Performed by: NURSE ANESTHETIST, CERTIFIED REGISTERED

## 2019-09-17 PROCEDURE — 83051 HEMOGLOBIN PLASMA: CPT | Performed by: STUDENT IN AN ORGANIZED HEALTH CARE EDUCATION/TRAINING PROGRAM

## 2019-09-17 PROCEDURE — 25000128 H RX IP 250 OP 636: Performed by: INTERNAL MEDICINE

## 2019-09-17 PROCEDURE — 25000125 ZZHC RX 250: Performed by: SURGERY

## 2019-09-17 PROCEDURE — 25000128 H RX IP 250 OP 636: Performed by: SURGERY

## 2019-09-17 PROCEDURE — 85027 COMPLETE CBC AUTOMATED: CPT | Performed by: SURGERY

## 2019-09-17 PROCEDURE — 36000068 ZZH SURGERY LEVEL 5 1ST 30 MIN - UMMC: Performed by: THORACIC SURGERY (CARDIOTHORACIC VASCULAR SURGERY)

## 2019-09-17 PROCEDURE — 33949 ECMO/ECLS DAILY MGMT ARTERY: CPT

## 2019-09-17 PROCEDURE — 86140 C-REACTIVE PROTEIN: CPT | Performed by: STUDENT IN AN ORGANIZED HEALTH CARE EDUCATION/TRAINING PROGRAM

## 2019-09-17 PROCEDURE — 85610 PROTHROMBIN TIME: CPT | Performed by: SURGERY

## 2019-09-17 PROCEDURE — 82805 BLOOD GASES W/O2 SATURATION: CPT | Performed by: INTERNAL MEDICINE

## 2019-09-17 PROCEDURE — 36000070 ZZH SURGERY LEVEL 5 EA 15 ADDTL MIN - UMMC: Performed by: THORACIC SURGERY (CARDIOTHORACIC VASCULAR SURGERY)

## 2019-09-17 PROCEDURE — 36415 COLL VENOUS BLD VENIPUNCTURE: CPT | Performed by: STUDENT IN AN ORGANIZED HEALTH CARE EDUCATION/TRAINING PROGRAM

## 2019-09-17 PROCEDURE — 84100 ASSAY OF PHOSPHORUS: CPT | Performed by: STUDENT IN AN ORGANIZED HEALTH CARE EDUCATION/TRAINING PROGRAM

## 2019-09-17 PROCEDURE — 40000344 ZZHCL STATISTIC THAWING COMPONENT

## 2019-09-17 PROCEDURE — 40000275 ZZH STATISTIC RCP TIME EA 10 MIN

## 2019-09-17 PROCEDURE — 25000128 H RX IP 250 OP 636: Performed by: NURSE ANESTHETIST, CERTIFIED REGISTERED

## 2019-09-17 PROCEDURE — 40000986 XR CHEST PORT 1 VW

## 2019-09-17 PROCEDURE — 80053 COMPREHEN METABOLIC PANEL: CPT | Performed by: STUDENT IN AN ORGANIZED HEALTH CARE EDUCATION/TRAINING PROGRAM

## 2019-09-17 PROCEDURE — 37000009 ZZH ANESTHESIA TECHNICAL FEE, EACH ADDTL 15 MIN: Performed by: THORACIC SURGERY (CARDIOTHORACIC VASCULAR SURGERY)

## 2019-09-17 PROCEDURE — 84484 ASSAY OF TROPONIN QUANT: CPT | Performed by: STUDENT IN AN ORGANIZED HEALTH CARE EDUCATION/TRAINING PROGRAM

## 2019-09-17 PROCEDURE — 82330 ASSAY OF CALCIUM: CPT

## 2019-09-17 PROCEDURE — 84484 ASSAY OF TROPONIN QUANT: CPT | Performed by: SURGERY

## 2019-09-17 PROCEDURE — 83615 LACTATE (LD) (LDH) ENZYME: CPT | Performed by: STUDENT IN AN ORGANIZED HEALTH CARE EDUCATION/TRAINING PROGRAM

## 2019-09-17 PROCEDURE — 87040 BLOOD CULTURE FOR BACTERIA: CPT | Performed by: STUDENT IN AN ORGANIZED HEALTH CARE EDUCATION/TRAINING PROGRAM

## 2019-09-17 PROCEDURE — 82330 ASSAY OF CALCIUM: CPT | Performed by: THORACIC SURGERY (CARDIOTHORACIC VASCULAR SURGERY)

## 2019-09-17 PROCEDURE — P9037 PLATE PHERES LEUKOREDU IRRAD: HCPCS

## 2019-09-17 PROCEDURE — 85384 FIBRINOGEN ACTIVITY: CPT | Performed by: SURGERY

## 2019-09-17 PROCEDURE — 85347 COAGULATION TIME ACTIVATED: CPT

## 2019-09-17 PROCEDURE — 85027 COMPLETE CBC AUTOMATED: CPT | Performed by: INTERNAL MEDICINE

## 2019-09-17 PROCEDURE — 83735 ASSAY OF MAGNESIUM: CPT | Performed by: SURGERY

## 2019-09-17 PROCEDURE — 82947 ASSAY GLUCOSE BLOOD QUANT: CPT | Performed by: INTERNAL MEDICINE

## 2019-09-17 PROCEDURE — 85379 FIBRIN DEGRADATION QUANT: CPT | Performed by: STUDENT IN AN ORGANIZED HEALTH CARE EDUCATION/TRAINING PROGRAM

## 2019-09-17 PROCEDURE — 80053 COMPREHEN METABOLIC PANEL: CPT | Performed by: INTERNAL MEDICINE

## 2019-09-17 PROCEDURE — 71045 X-RAY EXAM CHEST 1 VIEW: CPT

## 2019-09-17 PROCEDURE — 25000132 ZZH RX MED GY IP 250 OP 250 PS 637: Performed by: NURSE ANESTHETIST, CERTIFIED REGISTERED

## 2019-09-17 PROCEDURE — 85300 ANTITHROMBIN III ACTIVITY: CPT | Performed by: STUDENT IN AN ORGANIZED HEALTH CARE EDUCATION/TRAINING PROGRAM

## 2019-09-17 PROCEDURE — 5A1522H EXTRACORPOREAL OXYGENATION, MEMBRANE, PERIPHERAL VENO-VENOUS: ICD-10-PCS | Performed by: THORACIC SURGERY (CARDIOTHORACIC VASCULAR SURGERY)

## 2019-09-17 PROCEDURE — 04QL0ZZ REPAIR LEFT FEMORAL ARTERY, OPEN APPROACH: ICD-10-PCS | Performed by: THORACIC SURGERY (CARDIOTHORACIC VASCULAR SURGERY)

## 2019-09-17 PROCEDURE — 84295 ASSAY OF SERUM SODIUM: CPT

## 2019-09-17 PROCEDURE — 85730 THROMBOPLASTIN TIME PARTIAL: CPT | Performed by: STUDENT IN AN ORGANIZED HEALTH CARE EDUCATION/TRAINING PROGRAM

## 2019-09-17 PROCEDURE — 25000132 ZZH RX MED GY IP 250 OP 250 PS 637: Performed by: SURGERY

## 2019-09-17 PROCEDURE — 83735 ASSAY OF MAGNESIUM: CPT | Performed by: INTERNAL MEDICINE

## 2019-09-17 PROCEDURE — 25000128 H RX IP 250 OP 636: Performed by: STUDENT IN AN ORGANIZED HEALTH CARE EDUCATION/TRAINING PROGRAM

## 2019-09-17 PROCEDURE — 82330 ASSAY OF CALCIUM: CPT | Performed by: STUDENT IN AN ORGANIZED HEALTH CARE EDUCATION/TRAINING PROGRAM

## 2019-09-17 PROCEDURE — P9041 ALBUMIN (HUMAN),5%, 50ML: HCPCS | Performed by: STUDENT IN AN ORGANIZED HEALTH CARE EDUCATION/TRAINING PROGRAM

## 2019-09-17 PROCEDURE — 41000018 ZZH PER-PERFUSION 1ST 30 MIN: Performed by: THORACIC SURGERY (CARDIOTHORACIC VASCULAR SURGERY)

## 2019-09-17 PROCEDURE — C9113 INJ PANTOPRAZOLE SODIUM, VIA: HCPCS | Performed by: STUDENT IN AN ORGANIZED HEALTH CARE EDUCATION/TRAINING PROGRAM

## 2019-09-17 PROCEDURE — 85379 FIBRIN DEGRADATION QUANT: CPT | Performed by: SURGERY

## 2019-09-17 PROCEDURE — 94003 VENT MGMT INPAT SUBQ DAY: CPT

## 2019-09-17 PROCEDURE — 27210437 ZZH NUTRITION PRODUCT SEMIELEM INTERMED LITER

## 2019-09-17 PROCEDURE — 85730 THROMBOPLASTIN TIME PARTIAL: CPT | Performed by: SURGERY

## 2019-09-17 PROCEDURE — 82803 BLOOD GASES ANY COMBINATION: CPT | Performed by: THORACIC SURGERY (CARDIOTHORACIC VASCULAR SURGERY)

## 2019-09-17 PROCEDURE — P9016 RBC LEUKOCYTES REDUCED: HCPCS

## 2019-09-17 PROCEDURE — 27210794 ZZH OR GENERAL SUPPLY STERILE: Performed by: THORACIC SURGERY (CARDIOTHORACIC VASCULAR SURGERY)

## 2019-09-17 PROCEDURE — 85730 THROMBOPLASTIN TIME PARTIAL: CPT | Performed by: INTERNAL MEDICINE

## 2019-09-17 PROCEDURE — 87205 SMEAR GRAM STAIN: CPT | Performed by: STUDENT IN AN ORGANIZED HEALTH CARE EDUCATION/TRAINING PROGRAM

## 2019-09-17 PROCEDURE — 25000125 ZZHC RX 250: Performed by: STUDENT IN AN ORGANIZED HEALTH CARE EDUCATION/TRAINING PROGRAM

## 2019-09-17 PROCEDURE — 83605 ASSAY OF LACTIC ACID: CPT | Performed by: THORACIC SURGERY (CARDIOTHORACIC VASCULAR SURGERY)

## 2019-09-17 PROCEDURE — 82947 ASSAY GLUCOSE BLOOD QUANT: CPT | Performed by: STUDENT IN AN ORGANIZED HEALTH CARE EDUCATION/TRAINING PROGRAM

## 2019-09-17 PROCEDURE — C9113 INJ PANTOPRAZOLE SODIUM, VIA: HCPCS | Performed by: SURGERY

## 2019-09-17 PROCEDURE — 85610 PROTHROMBIN TIME: CPT | Performed by: INTERNAL MEDICINE

## 2019-09-17 PROCEDURE — 93010 ELECTROCARDIOGRAM REPORT: CPT | Performed by: INTERNAL MEDICINE

## 2019-09-17 PROCEDURE — 84132 ASSAY OF SERUM POTASSIUM: CPT | Performed by: THORACIC SURGERY (CARDIOTHORACIC VASCULAR SURGERY)

## 2019-09-17 PROCEDURE — 25800030 ZZH RX IP 258 OP 636: Performed by: SURGERY

## 2019-09-17 PROCEDURE — 25000125 ZZHC RX 250: Performed by: NURSE ANESTHETIST, CERTIFIED REGISTERED

## 2019-09-17 PROCEDURE — 85520 HEPARIN ASSAY: CPT | Performed by: SURGERY

## 2019-09-17 PROCEDURE — 84100 ASSAY OF PHOSPHORUS: CPT | Performed by: INTERNAL MEDICINE

## 2019-09-17 PROCEDURE — 84132 ASSAY OF SERUM POTASSIUM: CPT | Performed by: STUDENT IN AN ORGANIZED HEALTH CARE EDUCATION/TRAINING PROGRAM

## 2019-09-17 PROCEDURE — 84132 ASSAY OF SERUM POTASSIUM: CPT | Performed by: INTERNAL MEDICINE

## 2019-09-17 PROCEDURE — 85610 PROTHROMBIN TIME: CPT | Performed by: STUDENT IN AN ORGANIZED HEALTH CARE EDUCATION/TRAINING PROGRAM

## 2019-09-17 PROCEDURE — 82805 BLOOD GASES W/O2 SATURATION: CPT | Performed by: SURGERY

## 2019-09-17 PROCEDURE — 83605 ASSAY OF LACTIC ACID: CPT | Performed by: STUDENT IN AN ORGANIZED HEALTH CARE EDUCATION/TRAINING PROGRAM

## 2019-09-17 PROCEDURE — 85652 RBC SED RATE AUTOMATED: CPT | Performed by: STUDENT IN AN ORGANIZED HEALTH CARE EDUCATION/TRAINING PROGRAM

## 2019-09-17 PROCEDURE — 83735 ASSAY OF MAGNESIUM: CPT | Performed by: STUDENT IN AN ORGANIZED HEALTH CARE EDUCATION/TRAINING PROGRAM

## 2019-09-17 PROCEDURE — 83605 ASSAY OF LACTIC ACID: CPT | Performed by: INTERNAL MEDICINE

## 2019-09-17 PROCEDURE — 40000986 XR ABDOMEN PORT 1 VW

## 2019-09-17 PROCEDURE — 87070 CULTURE OTHR SPECIMN AEROBIC: CPT | Performed by: STUDENT IN AN ORGANIZED HEALTH CARE EDUCATION/TRAINING PROGRAM

## 2019-09-17 PROCEDURE — 85520 HEPARIN ASSAY: CPT | Performed by: STUDENT IN AN ORGANIZED HEALTH CARE EDUCATION/TRAINING PROGRAM

## 2019-09-17 PROCEDURE — 82947 ASSAY GLUCOSE BLOOD QUANT: CPT

## 2019-09-17 PROCEDURE — 94640 AIRWAY INHALATION TREATMENT: CPT | Mod: 76

## 2019-09-17 PROCEDURE — 80048 BASIC METABOLIC PNL TOTAL CA: CPT | Performed by: STUDENT IN AN ORGANIZED HEALTH CARE EDUCATION/TRAINING PROGRAM

## 2019-09-17 PROCEDURE — 93005 ELECTROCARDIOGRAM TRACING: CPT

## 2019-09-17 PROCEDURE — 94640 AIRWAY INHALATION TREATMENT: CPT

## 2019-09-17 PROCEDURE — 99291 CRITICAL CARE FIRST HOUR: CPT | Mod: GC | Performed by: INTERNAL MEDICINE

## 2019-09-17 PROCEDURE — 80048 BASIC METABOLIC PNL TOTAL CA: CPT | Performed by: SURGERY

## 2019-09-17 PROCEDURE — P9059 PLASMA, FRZ BETWEEN 8-24HOUR: HCPCS

## 2019-09-17 PROCEDURE — C1758 CATHETER, URETERAL: HCPCS | Performed by: THORACIC SURGERY (CARDIOTHORACIC VASCULAR SURGERY)

## 2019-09-17 PROCEDURE — 40000014 ZZH STATISTIC ARTERIAL MONITORING DAILY

## 2019-09-17 PROCEDURE — 25000565 ZZH ISOFLURANE, EA 15 MIN: Performed by: THORACIC SURGERY (CARDIOTHORACIC VASCULAR SURGERY)

## 2019-09-17 RX ORDER — PROTAMINE SULFATE 10 MG/ML
INJECTION, SOLUTION INTRAVENOUS PRN
Status: DISCONTINUED | OUTPATIENT
Start: 2019-09-17 | End: 2019-09-17

## 2019-09-17 RX ORDER — DEXTROSE MONOHYDRATE 25 G/50ML
25-50 INJECTION, SOLUTION INTRAVENOUS
Status: DISCONTINUED | OUTPATIENT
Start: 2019-09-17 | End: 2019-10-07 | Stop reason: HOSPADM

## 2019-09-17 RX ORDER — ALBUTEROL SULFATE 90 UG/1
AEROSOL, METERED RESPIRATORY (INHALATION) PRN
Status: DISCONTINUED | OUTPATIENT
Start: 2019-09-17 | End: 2019-09-17

## 2019-09-17 RX ORDER — PIPERACILLIN SODIUM, TAZOBACTAM SODIUM 3; .375 G/15ML; G/15ML
3.38 INJECTION, POWDER, LYOPHILIZED, FOR SOLUTION INTRAVENOUS ONCE
Status: DISCONTINUED | OUTPATIENT
Start: 2019-09-17 | End: 2019-09-17

## 2019-09-17 RX ORDER — DOBUTAMINE HYDROCHLORIDE 200 MG/100ML
2.5 INJECTION INTRAVENOUS CONTINUOUS
Status: ACTIVE | OUTPATIENT
Start: 2019-09-17 | End: 2019-09-17

## 2019-09-17 RX ORDER — NOREPINEPHRINE BITARTRATE 0.06 MG/ML
0.03-0.4 INJECTION, SOLUTION INTRAVENOUS CONTINUOUS
Status: DISCONTINUED | OUTPATIENT
Start: 2019-09-17 | End: 2019-09-23

## 2019-09-17 RX ORDER — FUROSEMIDE 10 MG/ML
80 INJECTION INTRAMUSCULAR; INTRAVENOUS EVERY 6 HOURS
Status: DISCONTINUED | OUTPATIENT
Start: 2019-09-17 | End: 2019-09-20

## 2019-09-17 RX ORDER — PROPOFOL 10 MG/ML
INJECTION, EMULSION INTRAVENOUS PRN
Status: DISCONTINUED | OUTPATIENT
Start: 2019-09-17 | End: 2019-09-17

## 2019-09-17 RX ORDER — HEPARIN SODIUM 1000 [USP'U]/ML
INJECTION, SOLUTION INTRAVENOUS; SUBCUTANEOUS PRN
Status: DISCONTINUED | OUTPATIENT
Start: 2019-09-17 | End: 2019-09-17

## 2019-09-17 RX ORDER — SODIUM CHLORIDE, SODIUM GLUCONATE, SODIUM ACETATE, POTASSIUM CHLORIDE AND MAGNESIUM CHLORIDE 526; 502; 368; 37; 30 MG/100ML; MG/100ML; MG/100ML; MG/100ML; MG/100ML
INJECTION, SOLUTION INTRAVENOUS CONTINUOUS PRN
Status: DISCONTINUED | OUTPATIENT
Start: 2019-09-17 | End: 2019-09-17

## 2019-09-17 RX ORDER — NITROGLYCERIN 10 MG/100ML
INJECTION INTRAVENOUS PRN
Status: DISCONTINUED | OUTPATIENT
Start: 2019-09-17 | End: 2019-09-17

## 2019-09-17 RX ORDER — FENTANYL CITRATE 50 UG/ML
INJECTION, SOLUTION INTRAMUSCULAR; INTRAVENOUS PRN
Status: DISCONTINUED | OUTPATIENT
Start: 2019-09-17 | End: 2019-09-17

## 2019-09-17 RX ORDER — SODIUM CHLORIDE 9 MG/ML
INJECTION, SOLUTION INTRAVENOUS CONTINUOUS PRN
Status: DISCONTINUED | OUTPATIENT
Start: 2019-09-17 | End: 2019-09-17

## 2019-09-17 RX ORDER — NICOTINE POLACRILEX 4 MG
15-30 LOZENGE BUCCAL
Status: DISCONTINUED | OUTPATIENT
Start: 2019-09-17 | End: 2019-10-07 | Stop reason: HOSPADM

## 2019-09-17 RX ORDER — PIPERACILLIN SODIUM, TAZOBACTAM SODIUM 3; .375 G/15ML; G/15ML
3.38 INJECTION, POWDER, LYOPHILIZED, FOR SOLUTION INTRAVENOUS EVERY 6 HOURS
Status: DISCONTINUED | OUTPATIENT
Start: 2019-09-17 | End: 2019-09-23

## 2019-09-17 RX ORDER — DOBUTAMINE HCL IN DEXTROSE 5 % 500MG/250
2.5 INTRAVENOUS SOLUTION INTRAVENOUS CONTINUOUS
Status: DISCONTINUED | OUTPATIENT
Start: 2019-09-17 | End: 2019-09-23

## 2019-09-17 RX ADMIN — FENTANYL CITRATE 50 MCG: 50 INJECTION, SOLUTION INTRAMUSCULAR; INTRAVENOUS at 08:52

## 2019-09-17 RX ADMIN — IPRATROPIUM BROMIDE AND ALBUTEROL SULFATE 3 ML: .5; 3 SOLUTION RESPIRATORY (INHALATION) at 20:06

## 2019-09-17 RX ADMIN — FENTANYL CITRATE 50 MCG: 50 INJECTION, SOLUTION INTRAMUSCULAR; INTRAVENOUS at 10:54

## 2019-09-17 RX ADMIN — SENNOSIDES AND DOCUSATE SODIUM 2 TABLET: 8.6; 5 TABLET ORAL at 19:58

## 2019-09-17 RX ADMIN — MIDAZOLAM 6 MG/HR: 5 INJECTION INTRAMUSCULAR; INTRAVENOUS at 18:34

## 2019-09-17 RX ADMIN — FENTANYL CITRATE 50 MCG: 50 INJECTION, SOLUTION INTRAMUSCULAR; INTRAVENOUS at 08:01

## 2019-09-17 RX ADMIN — HEPARIN SODIUM (PORCINE) LOCK FLUSH IV SOLN 100 UNIT/ML 3000 UNITS: 100 SOLUTION at 00:14

## 2019-09-17 RX ADMIN — PIPERACILLIN SODIUM AND TAZOBACTAM SODIUM 3.38 G: 3; .375 INJECTION, POWDER, LYOPHILIZED, FOR SOLUTION INTRAVENOUS at 18:36

## 2019-09-17 RX ADMIN — PROPOFOL 50 MG: 10 INJECTION, EMULSION INTRAVENOUS at 10:54

## 2019-09-17 RX ADMIN — ALBUMIN HUMAN 12.5 G: 0.05 INJECTION, SOLUTION INTRAVENOUS at 04:53

## 2019-09-17 RX ADMIN — POTASSIUM CHLORIDE 20 MEQ: 29.8 INJECTION, SOLUTION INTRAVENOUS at 16:50

## 2019-09-17 RX ADMIN — HEPARIN SODIUM (PORCINE) LOCK FLUSH IV SOLN 100 UNIT/ML 3000 UNITS: 100 SOLUTION at 21:57

## 2019-09-17 RX ADMIN — HEPARIN SODIUM 3500 UNITS/HR: 10000 INJECTION, SOLUTION INTRAVENOUS at 19:55

## 2019-09-17 RX ADMIN — FENTANYL CITRATE 50 MCG: 50 INJECTION, SOLUTION INTRAMUSCULAR; INTRAVENOUS at 11:16

## 2019-09-17 RX ADMIN — Medication 2 G: at 17:54

## 2019-09-17 RX ADMIN — POLYETHYLENE GLYCOL 3350 17 G: 17 POWDER, FOR SOLUTION ORAL at 19:58

## 2019-09-17 RX ADMIN — NITROGLYCERIN 50 MCG: 10 INJECTION INTRAVENOUS at 10:53

## 2019-09-17 RX ADMIN — FENTANYL CITRATE 50 MCG: 50 INJECTION, SOLUTION INTRAMUSCULAR; INTRAVENOUS at 10:09

## 2019-09-17 RX ADMIN — SODIUM CHLORIDE: 900 INJECTION INTRAVENOUS at 11:28

## 2019-09-17 RX ADMIN — HEPARIN SODIUM (PORCINE) LOCK FLUSH IV SOLN 100 UNIT/ML 3000 UNITS: 100 SOLUTION at 20:17

## 2019-09-17 RX ADMIN — HEPARIN SODIUM 3500 UNITS/HR: 10000 INJECTION, SOLUTION INTRAVENOUS at 13:29

## 2019-09-17 RX ADMIN — SODIUM CHLORIDE, SODIUM GLUCONATE, SODIUM ACETATE, POTASSIUM CHLORIDE AND MAGNESIUM CHLORIDE: 526; 502; 368; 37; 30 INJECTION, SOLUTION INTRAVENOUS at 08:00

## 2019-09-17 RX ADMIN — PROTAMINE SULFATE 50 MG: 10 INJECTION, SOLUTION INTRAVENOUS at 11:11

## 2019-09-17 RX ADMIN — DEXMEDETOMIDINE 1 MCG/KG/HR: 100 INJECTION, SOLUTION, CONCENTRATE INTRAVENOUS at 01:30

## 2019-09-17 RX ADMIN — NITROGLYCERIN 50 MCG: 10 INJECTION INTRAVENOUS at 11:16

## 2019-09-17 RX ADMIN — IPRATROPIUM BROMIDE AND ALBUTEROL SULFATE 3 ML: .5; 3 SOLUTION RESPIRATORY (INHALATION) at 16:48

## 2019-09-17 RX ADMIN — HEPARIN SODIUM 10000 UNITS: 1000 INJECTION, SOLUTION INTRAVENOUS; SUBCUTANEOUS at 08:52

## 2019-09-17 RX ADMIN — CEFAZOLIN SODIUM 2 G: 2 INJECTION, SOLUTION INTRAVENOUS at 14:35

## 2019-09-17 RX ADMIN — DEXMEDETOMIDINE 1 MCG/KG/HR: 100 INJECTION, SOLUTION, CONCENTRATE INTRAVENOUS at 04:05

## 2019-09-17 RX ADMIN — CEFAZOLIN SODIUM 2 G: 2 INJECTION, SOLUTION INTRAVENOUS at 05:38

## 2019-09-17 RX ADMIN — FENTANYL CITRATE 50 MCG: 50 INJECTION, SOLUTION INTRAMUSCULAR; INTRAVENOUS at 22:16

## 2019-09-17 RX ADMIN — POTASSIUM CHLORIDE 20 MEQ: 29.8 INJECTION, SOLUTION INTRAVENOUS at 05:51

## 2019-09-17 RX ADMIN — PANTOPRAZOLE SODIUM 40 MG: 40 INJECTION, POWDER, FOR SOLUTION INTRAVENOUS at 19:58

## 2019-09-17 RX ADMIN — DEXMEDETOMIDINE 1 MCG/KG/HR: 100 INJECTION, SOLUTION, CONCENTRATE INTRAVENOUS at 18:20

## 2019-09-17 RX ADMIN — Medication 100 MCG/HR: at 20:24

## 2019-09-17 RX ADMIN — Medication: at 23:17

## 2019-09-17 RX ADMIN — Medication 2 G: at 15:00

## 2019-09-17 RX ADMIN — DEXMEDETOMIDINE 1 MCG/KG/HR: 100 INJECTION, SOLUTION, CONCENTRATE INTRAVENOUS at 23:22

## 2019-09-17 RX ADMIN — Medication 0.03 MCG/KG/MIN: at 06:00

## 2019-09-17 RX ADMIN — FUROSEMIDE 80 MG: 10 INJECTION, SOLUTION INTRAVENOUS at 15:59

## 2019-09-17 RX ADMIN — DOBUTAMINE 2.5 MCG/KG/MIN: 12.5 INJECTION, SOLUTION INTRAVENOUS at 04:06

## 2019-09-17 RX ADMIN — EPINEPHRINE 0.03 MCG/KG/MIN: 1 INJECTION PARENTERAL at 09:04

## 2019-09-17 RX ADMIN — HEPARIN SODIUM (PORCINE) LOCK FLUSH IV SOLN 100 UNIT/ML 3000 UNITS: 100 SOLUTION at 04:14

## 2019-09-17 RX ADMIN — DEXMEDETOMIDINE 0.8 MCG/KG/HR: 100 INJECTION, SOLUTION, CONCENTRATE INTRAVENOUS at 14:58

## 2019-09-17 RX ADMIN — ALBUTEROL SULFATE 6 PUFF: 90 AEROSOL, METERED RESPIRATORY (INHALATION) at 08:50

## 2019-09-17 RX ADMIN — DEXMEDETOMIDINE 1 MCG/KG/HR: 100 INJECTION, SOLUTION, CONCENTRATE INTRAVENOUS at 06:45

## 2019-09-17 RX ADMIN — Medication: at 16:46

## 2019-09-17 RX ADMIN — POTASSIUM CHLORIDE 20 MEQ: 29.8 INJECTION, SOLUTION INTRAVENOUS at 13:51

## 2019-09-17 RX ADMIN — ALBUMIN HUMAN 12.5 G: 0.05 INJECTION, SOLUTION INTRAVENOUS at 05:32

## 2019-09-17 RX ADMIN — PIPERACILLIN SODIUM AND TAZOBACTAM SODIUM 3.38 G: 3; .375 INJECTION, POWDER, LYOPHILIZED, FOR SOLUTION INTRAVENOUS at 23:19

## 2019-09-17 RX ADMIN — HEPARIN SODIUM (PORCINE) LOCK FLUSH IV SOLN 100 UNIT/ML 3000 UNITS: 100 SOLUTION at 16:45

## 2019-09-17 RX ADMIN — HEPARIN SODIUM 3500 UNITS/HR: 10000 INJECTION, SOLUTION INTRAVENOUS at 05:43

## 2019-09-17 RX ADMIN — DOBUTAMINE 2.5 MCG/KG/MIN: 12.5 INJECTION, SOLUTION INTRAVENOUS at 08:00

## 2019-09-17 RX ADMIN — ROCURONIUM BROMIDE 50 MG: 10 INJECTION INTRAVENOUS at 09:54

## 2019-09-17 RX ADMIN — FUROSEMIDE 80 MG: 10 INJECTION, SOLUTION INTRAVENOUS at 22:07

## 2019-09-17 RX ADMIN — ROCURONIUM BROMIDE 100 MG: 10 INJECTION INTRAVENOUS at 08:08

## 2019-09-17 RX ADMIN — NOREPINEPHRINE BITARTRATE 6.4 MCG: 1 INJECTION INTRAVENOUS at 10:27

## 2019-09-17 RX ADMIN — PANTOPRAZOLE SODIUM 40 MG: 40 INJECTION, POWDER, FOR SOLUTION INTRAVENOUS at 07:48

## 2019-09-17 ASSESSMENT — ACTIVITIES OF DAILY LIVING (ADL)
ADLS_ACUITY_SCORE: 21

## 2019-09-17 ASSESSMENT — MIFFLIN-ST. JEOR: SCORE: 2416.5

## 2019-09-17 NOTE — PROGRESS NOTES
ECMO Shift Summary:    Patient remains on VV ECMO, all equipment is functioning and alarms are appropriately set. RPM's 3400 with flow range 4.02-4.13 L/min. Sweep gas is at 3 LPM and FiO2 100%. Circuit remains free of air, clot and fibrin. Cannulas are secure with no bleeding from site. Extremities are warm. Suctioned ETT for blood tinged secretions.    Significant Shift Events: Patient was taken to OR at 8am for decannulation. Per perfusion, patient did not tolerate turn down in OR with PaCO2 of 75 mmHg. Patient was transitioned to VV ECMO with placement of 17 Fr in RIJ for return line, 25 Fr. venous catheter remains in LFV. LFA cannula and reperfusion lines were removed. Patient returned in stable condition on VV ECMO.    Vent settings:  Ventilation Mode: CMV/AC  (Continuous Mandatory Ventilation/ Assist Control)  FiO2 (%): 60 %  Rate Set (breaths/minute): 12 breaths/min  Tidal Volume Set (mL): 450 mL  PEEP (cm H2O): 8 cmH2O  Oxygen Concentration (%): 60 %  Resp: 15  .    Heparin is running at 3500 u/hr, ACT range 171-195.    Urine output is good, blood loss was none since return from OR. Product given included one unit RBC's and one unit platelets in OR.      Intake/Output Summary (Last 24 hours) at 2019 1758  Last data filed at 2019 1700  Gross per 24 hour   Intake 8938.48 ml   Output 6595 ml   Net 2343.48 ml       ECHO:  No results found for this or any previous visit.  Results for orders placed in visit on 18   Echocardiogram Complete    Narrative [id number]  ECH19  [id number]  [id number]^CARRINGTON^VINOD^[facility]  Diagnostic and Treamtent-3rd Floor  [address] [place] [id number]     Name: XAVIER ROWELL  MRN: [id number]  : 1960  Study Date: 2018 05:59 PM  Age: 58 yrs  Gender: Female  Patient Location: [facility] [place]  Reason For Study: Cough, Edema extremities  Ordering Physician: VINOD SHULTZ  Referring  Physician: VINOD SHULTZ  Performed By: Ryley Ridley RDCS     BSA: 2.7 m2  Height: 66 in  Weight: 418 lb  HR: 85  BP: 148/77 mmHg  _____________________________________________________________________________  __        Procedure  Echocardiogram with two-dimensional, color and spectral Doppler performed.  _____________________________________________________________________________  __        Interpretation Summary  Moderate concentric wall thickening consistent with left ventricular  hypertrophy is present.  Biventricular size and systolic function is normal.The LVEF is 55-60%.  No significant valve disease.  Inferior vena cava appers dilated measuring 2.4 cm  _____________________________________________________________________________  __        Left Ventricle  Left ventricular size is normal. Left ventricular function is normal.The EF is  55-60%. Moderate concentric wall thickening consistent with left ventricular  hypertrophy is present. Left ventricular diastolic function is indeterminate.  Regional wall motion is probably normal. Endocardial border definition  suboptimal and contrast not given.     Right Ventricle  The right ventricle is normal size. Global right ventricular function is  normal.     Atria  Both atria appear normal.     Mitral Valve  Mild mitral annular calcification is present. Trace mitral insufficiency is  present.        Aortic Valve  Mild aortic valve sclerosis is present. No aortic regurgitation is present.  Transvalvular Doppler is normal and without signs of signifcant stenosis.     Tricuspid Valve  The peak velocity of the tricuspid regurgitant jet is not obtainable.  Pulmonary artery systolic pressure cannot be assessed.     Pulmonic Valve  The pulmonic valve cannot be assessed.     Vessels  The aorta root is normal. Inferior vena cava appers dilated measuring 2.4 cm.  Ascending aorta 3.3 cm.     Pericardium  No pericardial effusion is present.      _____________________________________________________________________________  __  MMode/2D Measurements & Calculations  IVSd: 1.5 cm  LVIDd: 5.4 cm  LVIDs: 3.7 cm  LVPWd: 1.3 cm  FS: 30.5 %  LV mass(C)d: 323.5 grams  LV mass(C)dI: 118.2 grams/m2     asc Aorta Diam: 3.3 cm  LVOT diam: 2.2 cm  LVOT area: 4.0 cm2  LA Volume (BP): 71.6 ml  LA Volume Index (BP): 26.1 ml/m2  RWT: 0.47        Doppler Measurements & Calculations  MV E max emile: 67.4 cm/sec  MV A max emile: 88.8 cm/sec  MV E/A: 0.76  MV dec time: 0.17 sec  Ao V2 max: 203.2 cm/sec  Ao max P.0 mmHg  MERON(V,D): 2.3 cm2     LV V1 max P.5 mmHg  LV V1 max: 116.8 cm/sec  AV Emile Ratio (DI): 0.57  E/E' av.0  Lateral E/e': 13.4  Medial E/e': 12.6     _____________________________________________________________________________  __           Report approved by: Tariq Pappas 2018 10:50 AM          CXR:  Recent Results (from the past 24 hour(s))   XR Chest Port 1 View    Narrative    Exam: XR CHEST PORT 1 VW, 2019 1:33 AM    Indication: ECMO    Comparison: 2019    Findings:   Endotracheal tube tip projects over the mid trachea. Gastric tube and  enteric tube course below the field of view. Right-sided central  venous catheter tip projects over the superior cavoatrial junction.  Heart is enlarged. Stable bilateral mixed interstitial and patchy  airspace opacities. Unchanged left basilar consolidation versus  atelectasis. Bilateral pleural effusions, left larger than right. No  pneumothorax.      Impression    Impression:   1. Increased left pleural effusion.  2. Stable pulmonary edema with bibasilar atelectasis versus infection  .    I have personally reviewed the examination and initial interpretation  and I agree with the findings.    CANDIDA ANTONIO, DO   XR Chest Port 1 View    Narrative    XR CHEST PORT 1 VW  2019 1:48 PM      HISTORY: VV ecmo placement    COMPARISON: Same day chest radiograph    FINDINGS:   Single AP  radiograph of the chest. Endotracheal tube tip in similar  positioning over the midthoracic trachea. Enteric feeding tubes course  beyond the field-of-view. Right central line and new right IJ ECMO  cannula overlying the SVC/right atrial junction. A superior approach  ECMO cannula over the low right atrium. Cardiac silhouette is similar  in size. Lung volumes are similar with unchanged diffuse patchy  airspace opacities. Unchanged left basilar opacities. Unchanged  bilateral pleural effusions, left greater than right. No  pneumothoraces.      Impression    IMPRESSION:   1. New right IJ ECMO cannula overlying the SVC/right atrial junction.  Unchanged lower approach ECMO cannula overlying the low right atrium.  2. Unchanged pulmonary edema.  3. Unchanged left basilar opacities, atelectasis versus consolidation.    I have personally reviewed the examination and initial interpretation  and I agree with the findings.    DALE CLEMENS MD   XR Abdomen Port 1 View    Narrative    Examination:  XR ABDOMEN PORT 1 VW 9/17/2019 1:48 PM     Comparison: 9/11/2019, 9/10/2019    History: eval ft placement    Technique: Supine radiograph of the abdomen.    Findings:   Gastric tube tip and sidehole project over the expected location of  stomach. Feeding tube tip projects over the expected location of the  fourth portion of duodenum. Nonobstructed bowel gas pattern. No  pneumatosis. Partially visualized ethmoid cannula.      Impression    Impression:  Feeding tube tip projects over the expected location of the fourth  portion of duodenum.    I have personally reviewed the examination and initial interpretation  and I agree with the findings.    LILIANA RATLIFF       Labs:  Recent Labs   Lab 09/17/19  1609 09/17/19  1257 09/17/19  1155 09/17/19  1037   PH 7.39 7.39 7.42 7.40   PCO2 49* 45 42 45   PO2 109* 90 196* 152*   HCO3 30* 28 27 28   O2PER 60 60 100.0 100.0       Lab Results   Component Value Date    HGB 8.3 (L) 09/17/2019     PHGB 30 (H) 09/17/2019    PLT 80 (L) 09/17/2019    FIBR 829 (H) 09/17/2019    INR 1.27 (H) 09/17/2019     (H) 09/17/2019    DD 8.0 (H) 09/17/2019    AXA 0.48 09/17/2019    ANTCH 57 (L) 09/17/2019         Plan is to continue VV ECMO support.      Deja Ash, RT, RRT  9/17/2019 5:58 PM

## 2019-09-17 NOTE — PROGRESS NOTES
CLINICAL NUTRITION SERVICES - REASSESSMENT NOTE     Nutrition Prescription    Recommendations:  Increase bowel regimen. No documented BM since admit.     Malnutrition Status:    Does not meet criteria     Interventions by Registered Dietitian (RD):  - Continue Impact Peptide @ 65 ml/hr   - Continue daily thera-vit-m and thiamine (100 mg) for low EF%     Future Recommendations:  - Obtain metabolic cart if/when meets criteria (currently on VV ECMO)   - If standard formula indicated, recommend Nutren 1.5 @ 65 ml/hr + Prosource (3 pkts/day) to provide 2460 kcals (14 kcal/kg dry weight), 139 g PRO (2.4 g/kg IBW), 1186 mL H2O, 275 g CHO and no fiber daily.     EVALUATION OF THE PROGRESS TOWARD GOALS   Diet: NPO, vented   Nutrition Support: Impact Peptide @ 65 ml/hr via NDT. This provides 13 kcal/kg dry wt and 2.5 g protein/kg ideal body wt.   Intake: Received 100% minimum kcal/protein needs over the past week from TF. (received avg of 11 kcal/kg dry wt and 2 g protein/kg ideal body wt daily).      NEW FINDINGS   Labs: CRP 63 on 9/10 --> 180 on 9/17. Immune-modulating formula remains appropriate.   GI: No documented BM since admit. On senokot and miralax.   Skin: Ole score 11. Skin beneath bridle without skin breakdown or bridle string tension.   Supplementation: On daily thera-vit-m and thiamine (100 mg) for low EF%.     MALNUTRITION  % Intake: No decreased intake noted  % Weight Loss: None noted  Subcutaneous Fat Loss: None observed  Muscle Loss: None observed, but difficult to assess with body habitus and edema present.   Fluid Accumulation/Edema: Mild  Malnutrition Diagnosis: Patient does not meet two of the above criteria necessary for diagnosing malnutrition    Previous Goals   Total avg nutritional intake to meet a minimum of 11 kcal/kg (adm wt 178.6 kg) and 2 g PRO/kg (IBW 59 kg) daily.  Evaluation: Met    Previous Nutrition Diagnosis  Inadequate protein-energy intake related to resp status inhibiting ability to  take PO as evidenced by pt NPO x 1-2 days.     Evaluation: Improving    CURRENT NUTRITION DIAGNOSIS  Increased nutrient needs (protein) related to critical illness and obesity guidelines as evidenced by assessed protein needs of 2-2.5 g protein/kg ideal body weight.       INTERVENTIONS  Implementation  Collaboration with other providers - Nutrition plan   Enteral Nutrition - Continue     Goals  Total avg nutritional intake to meet a minimum of 11 kcal/kg dry/admit weight (178.6 kg) and 2 g PRO/kg ideal body weight (59 kg).    Monitoring/Evaluation  Progress toward goals will be monitored and evaluated per protocol.    Katherine Camarena RD, LD  Pager: [phone]

## 2019-09-17 NOTE — ANESTHESIA POSTPROCEDURE EVALUATION
Anesthesia POST Procedure Evaluation    Patient: Shira Rodriguez   MRN:     [id number] Gender:   female   Age:    59 year old :      1960        Preoperative Diagnosis: Pulmonary Embolism   Procedure(s):  Venous-Venous cannulation using ultrasound guidance and transesophageal echocardiogram, venous-arterial ecmo decannulation and repair of left femoral artery  Bronchoscopy flexible   Postop Comments: No value filed.       Anesthesia Type:  Not documented  General    Reportable Event: NO     PAIN:        Neuro/Psych:   Sign Out Status: Planned Postop Sedation     Airway/Resp.: Uneventful perioperative course   Sign Out Status: Airway Device resent     Airway Device: ETT                 Reason: Planned (pre-op)     CV: Uneventful perioperative course   Sign Out status: CV Support within EXPECTED Parameters     Disposition:   Sign Out in:  PACU  Disposition:  Phase II; Home  Recovery Course: Uneventful  Follow-Up: Not required           Last Anesthesia Record Vitals:  CRNA VITALS  2019 1152 - 2019 1252      2019             Pulse:  65    ART BP:  104/72          Last PACU Vitals:  Vitals Value Taken Time   BP     Temp 36.4  C (97.52  F) 2019  4:09 PM   Pulse     Resp     SpO2 100 % 2019  4:09 PM   Temp src     NIBP     Pulse     SpO2     Resp     Temp     Ht Rate     Temp 2     Vitals shown include unvalidated device data.      Electronically Signed By: Jeramie Tavares MD, 2019, 4:10 PM

## 2019-09-17 NOTE — OP NOTE
OPERATIVE DATE: 9/17/2019    PRE-OPERATIVE DIAGNOSIS:  1) Pulmonary embolism  2) VA ECMO  Patient Active Problem List   Diagnosis     Primary localized osteoarthrosis, pelvic region and thigh     Adjustment disorder with depressed mood     Tobacco use disorder     Morbid obesity (H)     Arthritis of knee, degenerative     Degeneration of cervical intervertebral disc     CYRUS (obstructive sleep apnea)     Elevated blood pressure (not hypertension)     Pulmonary emboli (H)     Rheumatoid arthritis (H)     POST-OPERATIVE DIAGNOSIS:  1) Pulmonary embolism  2) VA ECMO  Patient Active Problem List   Diagnosis     Primary localized osteoarthrosis, pelvic region and thigh     Adjustment disorder with depressed mood     Tobacco use disorder     Morbid obesity (H)     Arthritis of knee, degenerative     Degeneration of cervical intervertebral disc     CYRUS (obstructive sleep apnea)     Elevated blood pressure (not hypertension)     Pulmonary emboli (H)     Rheumatoid arthritis (H)     PROCEDURE:  1) Flexible bronchoscopy  2) VV ECMO cannulation via right internal jugular vein  3) VA ECMO decannulation  4) Repair of left femoral artery  5) Trans-esophageal echocardiography    SURGEON: Patrick Rayo MD    ASSISTANT: Zaki Lopez MD    ANESTHESIA: GETA    ESTIMATED BLOOD LOSS: 500cc    INDICATIONS:  Ms. XAVIER ROWELL is a 59 year old female admitted with pulmonary embolism requiring VA ECMO. I was asked to evaluate for ECMO decannulation.  Risks and benefits of the operation were explained to the patient and their family including, but not limited to, bleeding, infection, stroke and even death.  They understood these risks and agreed to proceed electively.    OPERATIVE REPORT:  The patient was transferred to the operating room and positioned supine on the OR table.  General anesthesia was initiated by the anesthesia team.  Endotracheal intubation and IV access was already in place.  Flexible bronchoscopy was performed to  clear airway secretions.  O2 saturations improved.  The patients neck, chest, abdomen and bilateral lower extremities were clipped, prepped and draped in sterile fashion.  A pre-procedure time-out was performed confirming the correct patient, correct site and correct procedure.  A transesophageal echocardiogram was performed.  The patient was given [id number] U IV heparin.  Flow on the circuit was decreased to 1L.  An ABG after 5 minutes showed elevated pCO2 and low pO2.  She was stable from a hemodynamic standpoint.  I discussed with Dr. Mcfarlane.  We decided to proceed with VV ECMO cannulation.  The right internal jugular vein was accessed using ultrasound guidance.  A 17F venous return cannula was placed using Seldinger technique.  The patient was then converted from VA to VV ECMO.  Her hemodynamics remained stable.  Her O2 saturation improved.  Next a longitudinal skin incision was made in the left groin crease.  The femoral vessels were isolated.  A 4-0 prolene pursestring was placed around the arterial cannula.  Next the arterial cannula was removed.  The site was repaired with multiple 4-0 prolene stitches.  The wound was made hemostatic.  The incision was closed in layers using vicryl stitches.  Nylon stitches were used to approximate the skin.  A Proveena skin vac was applied.       The patient was then transferred from the operating bed to an ICU bed and transferred to the ICU in critical, but stable, condition.    All needle, sponge and instrument counts were correct at the end of the case.    Patrick Rayo  Cardiothoracic Surgery  Pager: [phone]

## 2019-09-17 NOTE — CONSULTS
[facility], [place]   Palliative Care Daily Progress Note          Palliative Care was consulted for decisional support and goals of care in the setting of V-A ECMO. Family is involved but has not been available while our team is in house, so we have not met them. Shira has now been decannulated from V-A ECMO and placed on V-V ECMO. Due to her slow improvement and our lack of relationship with her family, we will sign off for now. If our team is needed for patient/family support or goals of care in the future, please re-consult us.    Thank you for the opportunity to be involved in the care of this patient.    OK Nails CNP  Palliative Care Consult Team  Pager: [phone]    [facility] Inpatient Team Consult pager [phone] (M-F 8-4:30)  After-hours Answering Service [phone]   Palliative Clinic: [phone]

## 2019-09-17 NOTE — ANESTHESIA PREPROCEDURE EVALUATION
Anesthesia Pre-Procedure Evaluation    Patient: Shira Rodriguez   MRN:     [id number] Gender:   female   Age:    59 year old :      1960        Preoperative Diagnosis: Pulmonary Embolism   Procedure(s):  REMOVAL, CANNULA, ADULT, FOR ECMO     Past Medical History:   Diagnosis Date     Chronic bronchitis (H) 2015     Contact dermatitis      Depressive disorder 1985     Eczema     HANDS     Elevated liver enzymes      H/O total knee replacement, left      History of total hip replacement 10/27/2011     Hyperlipidemia      Hypertension      Morbid obesity (H)      Osteoarthrosis     right knee     Sleep apnea      Tobacco use disorder       Past Surgical History:   Procedure Laterality Date     ARTHROPLASTY KNEE  2012    Procedure: ARTHROPLASTY KNEE;  Left Total Knee Arthroplasty;  Surgeon: Annette Quesada MD;  Location: [facility] OR     ARTHROSCOPY HIP Right      C TOTAL HIP ARTHROPLASTY  04    RT     CV EXTRACORPERAL MEMBRANE OXYGENATION N/A 2019    Procedure: Extracorporeal Membrance Oxygenation;  Surgeon: Kaleb Mcfarlane MD;  Location:  [facility]          Anesthesia Evaluation     .             ROS/MED HX    ENT/Pulmonary: Comment: Respiratory failure     (+)sleep apnea, , . .    Neurologic: Comment: Precedex/fentanyl/midazolam gtt      Cardiovascular: Comment: PEA arrest secondary to massive PE, on peripheral VA ECMO, s/p IR suction mechanical thrombectomy and catheter directed lytics.    On dobutamine @2.5      (+) hypertension----. Taking blood thinners : . . . :. .       METS/Exercise Tolerance:     Hematologic:     (+) Anemia, Other Hematologic Disorder-DIC 9/10/2109; thrombocytopenia      Musculoskeletal:   (+) arthritis,  -       GI/Hepatic: Comment: Feeds via feeding tube         Renal/Genitourinary:         Endo:     (+) Obesity (BMI 65), .      Psychiatric:         Infectious Disease:         Malignancy:         Other:                         PHYSICAL EXAM:    Mental Status/Neuro:    Airway: Facies: Feasible  Mallampati: Not Assessed  Mouth/Opening: Not Assessed  TM distance: Not Assessed  Neck ROM: Not Assessed   Respiratory:    CV:    Comments: Intubated and sedated                     LABS:  CBC:   Lab Results   Component Value Date    WBC 14.9 (H) 09/17/2019    WBC 15.1 (H) 09/16/2019    HGB 8.1 (L) 09/17/2019    HGB 8.2 (L) 09/16/2019    HCT 25.4 (L) 09/17/2019    HCT 26.0 (L) 09/16/2019    PLT 91 (L) 09/17/2019     (L) 09/16/2019     BMP:   Lab Results   Component Value Date     09/17/2019     09/16/2019    POTASSIUM 3.7 09/17/2019    POTASSIUM 4.3 09/17/2019    CHLORIDE 102 09/17/2019    CHLORIDE 102 09/16/2019    CO2 30 09/17/2019    CO2 30 09/16/2019    BUN 33 (H) 09/17/2019    BUN 34 (H) 09/16/2019    CR 0.92 09/17/2019    CR 0.94 09/16/2019     (H) 09/17/2019     (H) 09/17/2019     (H) 09/17/2019     COAGS:   Lab Results   Component Value Date    PTT 88 (H) 09/17/2019    INR 1.20 (H) 09/17/2019    FIBR 941 (H) 09/17/2019     POC:   Lab Results   Component Value Date     (H) 09/16/2019     OTHER:   Lab Results   Component Value Date    PH 7.44 09/17/2019    LACT 0.4 (L) 09/17/2019    A1C 5.6 09/10/2019    MARIA INES 8.9 09/17/2019    PHOS 4.8 (H) 09/17/2019    MAG 2.0 09/17/2019    ALBUMIN 2.1 (L) 09/17/2019    PROTTOTAL 5.8 (L) 09/17/2019    ALT 27 09/17/2019    AST 14 09/17/2019    ALKPHOS 82 09/17/2019    BILITOTAL 0.7 09/17/2019    LIPASE 165 04/11/2017    TSH 1.81 04/23/2019    T4 1.02 04/23/2019    .0 (H) 09/17/2019     (H) 09/17/2019        Preop Vitals    BP Readings from Last 3 Encounters:   09/14/19 130/68   09/09/19 119/77   08/12/19 132/81    Pulse Readings from Last 3 Encounters:   09/10/19 86   09/09/19 130   08/12/19 84      Resp Readings from Last 3 Encounters:   09/17/19 21   09/09/19 16   08/12/19 16    SpO2 Readings from Last 3 Encounters:   09/17/19 100%   09/09/19 96%   08/12/19 92%     "  Temp Readings from Last 1 Encounters:   09/17/19 36.9  C (98.4  F)    Ht Readings from Last 1 Encounters:   09/10/19 1.676 m (5' 5.98\")      Wt Readings from Last 1 Encounters:   09/17/19 (!) 182 kg (401 lb 3.8 oz)    Estimated body mass index is 64.79 kg/m  as calculated from the following:    Height as of 9/10/19: 1.676 m (5' 5.98\").    Weight as of an earlier encounter on 9/17/19: 182 kg (401 lb 3.8 oz).     LDA:  Peripheral IV 09/09/19 Left Hand (Active)   Site Assessment [facility] 9/17/2019  4:00 AM   Line Status Saline locked 9/17/2019  4:00 AM   Phlebitis Scale 0-->no symptoms 9/17/2019  4:00 AM   Infiltration Scale 0 9/17/2019  4:00 AM   Infiltration Site Treatment Method  None 9/17/2019  4:00 AM   Extravasation? No 9/17/2019  4:00 AM   Dressing Intervention New dressing  9/10/2019  4:00 PM   Number of days: 8       Peripheral IV 09/09/19 Right Upper forearm (Active)   Site Assessment [facility] 9/17/2019  4:00 AM   Line Status Saline locked 9/17/2019  4:00 AM   Phlebitis Scale 0-->no symptoms 9/17/2019  4:00 AM   Infiltration Scale 0 9/17/2019  4:00 AM   Infiltration Site Treatment Method  None 9/17/2019  4:00 AM   Extravasation? No 9/17/2019  4:00 AM   Dressing Intervention New dressing  9/12/2019 12:00 AM   Number of days: 8       Arterial Line 09/09/19 Radial (Active)   Site Assessment [facility] 9/17/2019  4:00 AM   Line Status Pulsatile blood flow 9/17/2019  4:00 AM   Arterine Line Cap Change Due 09/20/19 9/17/2019  4:00 AM   Art Line Waveform Appropriate;Other (Comment) 9/17/2019  4:00 AM   Art Line Interventions Leveled;Flushed per protocol 9/17/2019  4:00 AM   Color/Movement/Sensation Capillary refill less than 3 sec 9/17/2019  4:00 AM   Line Necessity Yes, meets criteria 9/17/2019  4:00 AM   Dressing Type Transparent 9/17/2019  4:00 AM   Dressing Status Clean, dry, intact 9/17/2019  4:00 AM   Dressing Intervention Dressing changed/new dressing 9/15/2019 12:00 AM   Dressing Change Due 09/22/19 9/15/2019  8:00 PM "   Number of days: 8       CVC Triple Lumen 09/10/19 Right Internal jugular (Active)   Site Assessment [facility] 9/17/2019  4:00 AM   Dressing Intervention Chlorhexidine sponge;Transparent 9/16/2019  4:00 PM   Dressing Change Due 09/22/19 9/16/2019 12:00 AM   CVC Comment small amount of drid drainage 9/13/2019  8:00 PM   Lumen A - Color BROWN 9/17/2019  4:00 AM   Lumen A - Status infusing 9/17/2019  4:00 AM   Lumen A - Cap Change Due 09/20/19 9/17/2019  4:00 AM   Lumen B - Color BLUE 9/17/2019  4:00 AM   Lumen B - Status infusing 9/17/2019  4:00 AM   Lumen B - Cap Change Due 09/20/19 9/17/2019  4:00 AM   Lumen C - Color WHITE 9/17/2019  4:00 AM   Lumen C - Status infusing 9/17/2019  4:00 AM   Lumen C - Cap Change Due 09/20/19 9/17/2019  4:00 AM   Extravasation? No 9/17/2019  4:00 AM   Number of days: 7       Arterial Sheath  (Active)   Specific Qualities Sutured;Left in Place 9/17/2019  4:00 AM   Site Assessment [facility] 9/17/2019  4:00 AM   Dressing Type Other (Comment) 9/17/2019  4:00 AM   Arterial Sheath Comment LFA ECMO Cannula in Placed 9/17/2019  4:00 AM   Number of days: 8       Arterial Sheath  (Active)   Specific Qualities Sutured;Left in Place 9/17/2019  4:00 AM   Site Assessment [facility] 9/17/2019  4:00 AM   Dressing Type Other (Comment) 9/17/2019  4:00 AM   Arterial Sheath Comment LFA Reperfusion cannula in placed 9/17/2019  4:00 AM   Number of days: 8       Venous Sheath (Active)   Specific Qualities Sutured;Left in Place 9/17/2019  4:00 AM   Site Assessment [facility] 9/17/2019  4:00 AM   Dressing Type Other (Comment) 9/17/2019  4:00 AM   Venous Sheath Comment LFV ECMO Cannula in Placed 9/17/2019  4:00 AM   Number of days: 8       ETT (adult) 7.5 (Active)   Secured By Commercial tube [email or web address] 9/17/2019  4:00 AM   Site Appearance Clean and dry 9/17/2019  4:00 AM   Secured at (cm) to lip 24 cm 9/17/2019  4:00 AM   Site of ET Tube Securement At lip 9/17/2019  4:00 AM   Cuff Pressure - Type minimal leak technique 9/17/2019  4:00 AM    Cuff Pressure - cm H2O 30 cmH20 9/9/2019  8:48 PM   Tube Care/Reposition repositioned tube right side of mouth 9/17/2019  6:00 AM   Bite Block Secure and Patent 9/17/2019  6:00 AM   Safety Measures manual resuscitator at bedside;manual resuscitator/mask/valve in room;manual resuscitator/PEEP valve in room 9/17/2019  4:00 AM   Number of days: 8       NG/OG Tube 10 fr Right nostril (Active)   Site Description WDL 9/17/2019  4:00 AM   Status Enteral Feedings 9/17/2019  4:00 AM   Placement Check [place] unchanged;Respiratory status unchanged 9/17/2019  4:00 AM   [place] (cm marking) at nare/mouth 118 cm 9/17/2019  4:00 AM   Intake (ml) 100 ml 9/16/2019  8:00 PM   Flush/Free Water (mL) 75 mL 9/17/2019  7:00 AM   Number of days: 7       NG/OG Tube Orogastric (Active)   Site Description WD 9/17/2019  4:00 AM   Status Suction-low intermittent 9/17/2019  4:00 AM   Drainage Appearance Dark Red 9/17/2019  4:00 AM   Placement Check [place] unchanged;Respiratory status unchanged 9/17/2019  4:00 AM   [place] (cm marking) at nare/mouth 71 cm 9/17/2019  4:00 AM   Flush/Free Water (mL) 30 mL 9/17/2019  4:00 AM   Container Amount 0 mL 9/17/2019  4:00 AM   Output (ml) 75 ml 9/17/2019  4:00 AM   Number of days: 7       Urethral Catheter 16 fr (Active)   Tube Description Positional 9/16/2019  4:00 PM   Catheter Care Catheter wipes;Done 9/17/2019  8:00 AM   Collection Container Standard;Patent 9/17/2019  8:00 AM   Securement Method Securing device (Describe) 9/17/2019  8:00 AM   Rationale for Continued Use Strict 1-2 Hour I&O;Deep Sedation/Paralysis;Wound Healing 9/17/2019  8:00 AM   Urine Output 210 mL 9/17/2019  6:00 AM   Number of days: 6        Assessment:   ASA SCORE: 4 emergent   H&P: History and physical reviewed and following examination; no interval change.   Smoking Status:  Non-Smoker/Unknown   NPO Status: NPO Appropriate     Plan:   Anes. Type:  General   Pre-Medication: None   Induction:  IV (Standard)   Airway: ETT;  Oral   Access/Monitoring: PIV; A-Line; Central Access/Port present   Maintenance: Balanced     Drips/Meds: Epinephrine; Norepi     Advanced Monitoring: LETA Adult            ADULT LETA Checklist:               Absolute Contra-Indications: NONE               Relative Contra-Indications:  NONE               Final Plan: Proceed with LETA     Postop Plan:   Postop Pain: Opioids  Postop Sedation/Airway: SECURED AIRWAY likely; Sedation likely       Postop Sedation: Dexmedetomidine Infusion  Disposition: ICU     PONV Management:   Adult Risk Factors: Female, Postop Opioids   Prevention:, Other     CONSENT: Telephone (consent obtained from Gina (sister))   Plan and risks discussed with: Patient   Blood Products: Consented (ALL Blood Products)                       Jeramie Tavares MD

## 2019-09-17 NOTE — PROGRESS NOTES
Cardiology Progress Note    Assessment & Plan      59 year old female with a history of hypertension, hyperlipidemia, sleep apnea, morbid obesity, and chronic bronchitis who presents to the emergency department via EMS for evaluation of respiratory distress s/p intubation and PEA arrest with CTA consistent with bilateral PE transferred here for [facility] ECMO thrombectomy and catheter directed lytics on 9/9.    Plan for today  - de cannulation  -stop lasix   - change zosyn to cefazolin for MSSA pneumonia    Neurology: Intubated, sedated  --continue versed, fentanyl, precedex - hypotensive with propofol   - initial CTH no acute changes, moving when sedation is weaned down and following commands    Cardiovascular / Hemodynamics: PEA arrest secondary to massive PE.   CTA-PE study demonstrates bilateral distal main and proximal lobar pulmonary emboli, greater on the right with elevated RV/LV ratio. Peripheral V-A ECMO inserted for hemodynamic support for massive PE. Went to cath lab and then to IR for suction mechanical thrombectomy.  LA 8.6   TTE: dilated RV, severely reduced. Turn down 9/16 LVEF 45-55%, RV function mildly reduced and trivial pericardial effusion stable gases.   EKG: sinus tachy  --ACT goal 180-200  - on dobutamine at 2.5   Pulmonary: PEA arrest secondary to massive PE. See above.   Ventilation Mode: CMV/AC  (Continuous Mandatory Ventilation/ Assist Control)  FiO2 (%): 60 %  Rate Set (breaths/minute): 12 breaths/min  Tidal Volume Set (mL): 450 mL  PEEP (cm H2O): 8 cmH2O  Oxygen Concentration (%): 60 %  Resp: 21  ETT in appropriate place.   -s/p thrombectomy and catheter directed lytics 9/9-9/10  Chest CTSubpleural groundglass attenuation in the right lung, possibly  pulmonary infarcts given previously seen extensive pulmonary emboli.   Patchy nodularity in the right base suggesting aspiration.  - Now weaning vent requirements. CXR showing white out of left lung- bronched without mucous plugs, pulmonary edema.  CT chest showed L pleural effusion increased R pleural effusion and some concern for tracheomalacia?  CXR: Lines in stable position.   --- scheduled duonebs    GI and Nutrition: No known medical hx.    --monitor BID LFTs  - nutrition consult   --GI Prophylaxis: PPI    Renal, Fluid and Electrolytes: Cr at baseline  UOP continue to monitor.  - volume overloaded will increase diuresis with 80mg q6h  --maintain K>3 and Mg>2    Infectious Disease: Aspiration pneumonia -- sputum culture MSSA  --vancomycin/zosyn x5 days for ECMO, vanc last dose 9/15, will continue cefazolin to complete 10 days   Hematology and Oncology: Receiving heparin for ECMO and  PE.   --cryo PRN fibrinogen < 200; FFP for INR >2  --Transfuse for Hgb<10  --heparin gtt for ECMO with ACT goal 160-180  --US LE w/ arterial duplex per ECMO protocol   --DVT PPX: Heparin as above  - course complicated by DIC with fibrinogen of 82 on 9/10 got 5 unit(s) of cryo   Endocrinology: No known medical history. BG elevated.  --insulin gtt   Lines: R femoral arterial and venous ECMO cannulae September 9, 2019  L femoral arterial line September 9, 2019  R radial arterial line September 9, 2019  ETT September 9, 2019  Underwood catheter September 9, 2019  OG tube September 9, 2019  Restraint: needed    Current lines are required for patient management       Family update by me today: Yes      Code Status:     The pt was discussed and evaluated with Dr. Tk Chilel,     Interval History   Net negative 1.1L yesterday made 8 L of urine was somewhat hypotensive this am given some volume back   SCr 0.9 [id number]       Ventilation Mode: CMV/AC  (Continuous Mandatory Ventilation/ Assist Control)  FiO2 (%): 60 %  Rate Set (breaths/minute): 12 breaths/min  Tidal Volume Set (mL): 450 mL  PEEP (cm H2O): 8 cmH2O  Oxygen Concentration (%): 60 %  Resp: 21        Physical Exam   Temp: 98.4  F (36.9  C) Temp src: Esophageal     Heart Rate: 77 Resp: 21 SpO2: 100 % O2  "Device: Mechanical Ventilator    Vitals:    09/15/19 0400 09/16/19 0200 09/17/19 0200   Weight: (!) 180.2 kg (397 lb 4.3 oz) (!) 182 kg (401 lb 3.8 oz) (!) 182 kg (401 lb 3.8 oz)     Vital Signs with Ranges  Temp:  [94.6  F (34.8  C)-100.2  F (37.9  C)] 98.4  F (36.9  C)  Heart Rate:  [65-99] 77  Resp:  [18-28] 21  MAP:  [53 mmHg-106 mmHg] 82 mmHg  Arterial Line BP: ()/(43-81) 153/46  FiO2 (%):  [40 %-60 %] 60 %  SpO2:  [97 %-100 %] 100 %  I/O last 3 completed shifts:  In: 7914.52 [I.V.:3504.52; NG/GT:2050]  Out: 7945 [Urine:7720; Emesis/NG output:225]    Heart Rate: 77, Blood pressure 130/68, pulse 86, temperature 98.4  F (36.9  C), resp. rate 21, height 1.676 m (5' 5.98\"), weight (!) 182 kg (401 lb 3.8 oz), SpO2 100 %, not currently breastfeeding.  401 lbs 3.8 oz  GEN:  Morbidly obese sedated and intubated  CV:  Distant heart sounds   LUNGS:  Decreased breath sounds  ABD:  Active bowel sounds, soft, non-tender/non-distended.  No rebound/guarding/rigidity.  EXT:  No edema or cyanosis.  ECMO canula in place with only scant oozing   Underwood in place    Medications     dexmedetomidine 1 mcg/kg/hr (09/17/19 0700)     IV fluid REPLACEMENT ONLY       IV fluid REPLACEMENT ONLY       DOBUTamine 2.5 mcg/kg/min (09/17/19 0700)     fentaNYL 100 mcg/hr (09/17/19 0700)     heparin 2 unit/mL in 0.9% NaCl 3 mL/hr (09/17/19 0700)     HEParin 3,500 Units/hr (09/17/19 0700)     insulin (regular) Stopped (09/14/19 0800)     midazolam 6 mg/hr (09/17/19 0700)     - MEDICATION INSTRUCTIONS -       norepinephrine 0.05 mcg/kg/min (09/17/19 0700)     - MEDICATION INSTRUCTIONS -         artificial tears   Both Eyes Q8H     ceFAZolin  2 g Intravenous Q8H     ipratropium - albuterol 0.5 mg/2.5 mg/3 mL  3 mL Nebulization 4x daily     multivitamin w/minerals  1 tablet Oral or Feeding Tube Daily     pantoprazole (PROTONIX) IV  40 mg Intravenous BID     polyethylene glycol  17 g Oral or Feeding Tube BID     senna-docusate  2 tablet Oral or " Feeding Tube QPM     sodium chloride (PF)  3 mL Intracatheter Q8H     vancomycin (VANCOCIN) IV  3,000 mg (central catheter) Intravenous Q24H     vitamin B1  100 mg Oral or Feeding Tube Daily       Data   Recent Labs   Lab 09/17/19  0608 09/17/19  0334 09/17/19  0142 09/17/19  0009 09/16/19  2151  09/16/19  1551   WBC  --  14.9*  --   --  15.1*  --  14.9*   HGB  --  8.1*  --   --  8.2*  --  8.2*   MCV  --  92  --   --  94  --  93   PLT  --  91*  --   --  107*  --  99*   INR  --  1.20*  --   --  1.25*  --  1.23*   NA  --  137  --   --  136  --  137   POTASSIUM  --  3.7  --  4.3 3.9   < > 3.8   CHLORIDE  --  102  --   --  102  --  102   CO2  --  30  --   --  30  --  29   BUN  --  33*  --   --  34*  --  34*   CR  --  0.92  --   --  0.94  --  0.90   ANIONGAP  --  6  --   --  5  --  6   MARIA INES  --  8.9  --   --  8.8  --  8.4*   * 111*  114* 106* 113* 120*   < > 124*  128*   ALBUMIN  --  2.1*  --   --  2.2*  --  2.1*   PROTTOTAL  --  5.8*  --   --  5.9*  --  5.8*   BILITOTAL  --  0.7  --   --  0.8  --  0.8   ALKPHOS  --  82  --   --  88  --  84   ALT  --  27  --   --  30  --  30   AST  --  14  --   --  17  --  17   TROPI  --  <0.015  --   --  <0.015  --  <0.015    < > = values in this interval not displayed.       Recent Results (from the past 24 hour(s))   XR Chest Port 1 View    Narrative    Exam: XR CHEST PORT 1 VW, 9/17/2019 1:33 AM    Indication: ECMO    Comparison: 9/16/2019    Findings:   Endotracheal tube tip projects over the mid trachea. Gastric tube and  enteric tube course below the field of view. Right-sided central  venous catheter tip projects over the superior cavoatrial junction.  Heart is enlarged. Stable bilateral mixed interstitial and patchy  airspace opacities. Unchanged left basilar consolidation versus  atelectasis. Bilateral pleural effusions, left larger than right. No  pneumothorax.      Impression    Impression:   1. Increased left pleural effusion.  2. Stable pulmonary edema with  bibasilar atelectasis versus infection  .    I have personally reviewed the examination and initial interpretation  and I agree with the findings.    CANDIDA ANTONIO, DO         Critical Care Services Progress Note:     Shira Rodriguez remains critically ill with cardiac arrhythmia, shock and PE     I personally examined and evaluated the patient today.   The patient s prognosis today is grave  I have evaluated all laboratory values and imaging studies from the past 24 hours.  Key findings and decisions made today included   Plan for today  - de cannulation  -stop lasix   - change zosyn to cefazolin for MSSA pneumonia  - ECMO Mangement  I personally managed the ventilator, sedation, pain control and analgesia, metabolic abnormalities, antibiotic therapy, nutritional status and vasoactive medications.   Consults ongoing and ordered are none  Procedures that will happen today are: none  All treatments were placed at my direction.  I formulated today s plan with the house staff team or resident(s) and agree with the findings and plan in the associated note.       The above plans and care have been discussed with family and all questions and concerns were addressed.     I spent a total of 60 minutes (excluding procedure time) personally providing and directing critical care services at the bedside and on the critical care unit for Shira Rodriguez.        Richard Frey MD

## 2019-09-17 NOTE — ANESTHESIA PROCEDURE NOTES
Perioperative LETA Report  Anesthesia Information  LETA probe placed and report generated by: : Jeramie Tavares MD Kiberenge, Roy Kagumba, MD  Images for this study have been archived.   Surgeon:  Patrick Rayo MD  Echocardiogram Comments: S/p conversion from VA to VV ECMO   There is a RIJ venous return cannula visible at the RA/IVC junction   There is no change in the RV or LV function   There is mild TR as per baseline.  There is no evidence of aortic aneurysm or dissection     Preanesthesia Checklist:  Patient identified, IV assessed, risks and benefits discussed, monitors and equipment assessed, procedure being performed at surgeon's request and anesthesia consent obtained.  General Procedure Information  Modalities:  2D, 3D, PW Doppler and CW Doppler      Echocardiographic and Doppler Measurements  Right Ventricle:  Cavity size dilated.   Thrombus not present.    Global function mildly impaired.     Left Ventricle:  Cavity size normal.   Thrombus not present.   Ejection Fraction 55%.      Ventricular Regional Function:  1- Basal Anteroseptal:  normal  2- Basal Anterior:  normal  3- Basal Anterolateral:  normal  4- Basal Inferolateral:  normal  5- Basal Inferior:  normal  6- Basal Inferoseptal:  normal  7- Mid Anteroseptal:  normal  8- Mid Anterior:  normal  9- Mid Anterolateral:  normal  10- Mid Inferolateral:  normal  11- Mid Inferior:  normal  12- Mid Inferoseptal:  normal  13- Apical Anterior:  normal  14- Apical Lateral:  normal  15- Apical Inferior:  normal  16- Apical Septal:  normal  17- Rochelle:  normal    Valves          Other Valve Findings:  AV: trileaflet. There is calcification and fusion of the non and right leaflets         no AS         Max P mmHg,  Mean P mmHg           no AI             MV: normal mitral leaflet morphology          mild MR    TV: mild TR     PV: not well visualized       Aorta: Ascending Aorta: Size normal.    Aortic Arch: Size normal.     Descending  Aorta: Size normal.         Left Atrium: Size normal.  Left atrial appendage normal.   Other Atria Findings:  There is a femoral venous cannula visible at the junction of the IVC and RA  There is also a thrombus that measures 2.1cm x 1.0cm at the RA IVC junction and it is attached to the interatrial septum         Post Intervention Findings  . .   Regional wall motion:   Surgeon(s) notified of all postintervention findings:  Yes  .  .  .   .  .  .  .  .  .  .        Echocardiogram Comments  S/p conversion from VA to VV ECMO   There is a RIJ venous return cannula visible at the RA/IVC junction   There is no change in the RV or LV function   There is mild TR as per baseline.  There is no evidence of aortic aneurysm or dissection

## 2019-09-17 NOTE — PROGRESS NOTES
ECMO Shift Summary:    Patient remains on VA ECMO, all equipment is functioning and alarms are appropriately set. RPM's 3000 with flow range 2.8 - 3.1 L/min. Sweep gas is at 0.5 LPM and FiO2 60%. Circuit remains free of air, clot and fibrin. Cannulas are secure with slight oozing from site. Extremities are warm. Suctioned ETT for moderate to large amount of thin, frothy, pink secretions.    Significant Shift Events:   ACTs below goal at start of shift, despite a bolus + infusion from previous specialist. Bolus + increase in gtt provided. ACT within goal after this, but then back into the 160s at the next check (verified x2 draws). Baljeet OG output, some oozing from site. On max dose heparin on pump; after discussing with MD, will maintain current infusion rate, bolus again if needed. Second bolus of heparin given, last .    Pt is being diuresed, has put out over 9 liters of urine in the last 24 hrs. MAPs trending down to the 50s this AM. PRBC x1 given + 500 mL of albumin. Dobutamine switched to Norepi per moonlighter, MAPs improved to upper 60s - 70s with the above mentioned interventions.     Vent settings:  Ventilation Mode: CMV/AC  (Continuous Mandatory Ventilation/ Assist Control)  FiO2 (%): 60 %  Rate Set (breaths/minute): 12 breaths/min  Tidal Volume Set (mL): 450 mL  PEEP (cm H2O): 8 cmH2O  Oxygen Concentration (%): 60 %  Resp: 19  .    Heparin is running at 3500 u/hr, ACT range 161 - 183.  Urine output is brisk, see flowsheet for totals, blood loss was not measured - minimal oozing from cannulation site, baljeet output from OG . Product given included PRBC x1, albumin 500 mL.      Intake/Output Summary (Last 24 hours) at 9/17/2019 0613  Last data filed at 9/17/2019 0600  Gross per 24 hour   Intake 7915.08 ml   Output 7735 ml   Net 180.08 ml       ECHO:  No results found for this or any previous visit.  Results for orders placed in visit on 06/07/18   Echocardiogram Complete    Narrative  [id number]  [facility]  [id number]  [id number]^CARRINGTON^VINOD^WILLIS           [facility]  Diagnostic and Treamtent-3rd Floor  [address] [place] [address]     Name: XAVIER ROWELL  MRN: [id number]  : 1960  Study Date: 2018 05:59 PM  Age: 58 yrs  Gender: Female  [place] [facility] [place]  Reason For Study: Cough, Edema extremities  Ordering Physician: VINOD SHULTZ  Referring Physician: VINOD SHULTZ  Performed By: Ryley Ridley RDCS     BSA: 2.7 m2  Height: 66 in  Weight: 418 lb  HR: 85  BP: 148/77 mmHg  _____________________________________________________________________________  __        Procedure  Echocardiogram with two-dimensional, color and spectral Doppler performed.  _____________________________________________________________________________  __        Interpretation Summary  Moderate concentric wall thickening consistent with left ventricular  hypertrophy is present.  Biventricular size and systolic function is normal.The LVEF is 55-60%.  No significant valve disease.  Inferior vena cava appers dilated measuring 2.4 cm  _____________________________________________________________________________  __        Left Ventricle  Left ventricular size is normal. Left ventricular function is normal.The EF is  55-60%. Moderate concentric wall thickening consistent with left ventricular  hypertrophy is present. Left ventricular diastolic function is indeterminate.  Regional wall motion is probably normal. Endocardial border definition  suboptimal and contrast not given.     Right Ventricle  The right ventricle is normal size. Global right ventricular function is  normal.     Atria  Both atria appear normal.     Mitral Valve  Mild mitral annular calcification is present. Trace mitral insufficiency is  present.        Aortic Valve  Mild aortic valve sclerosis is present. No aortic regurgitation is present.  Transvalvular Doppler is normal and  without signs of signifcant stenosis.     Tricuspid Valve  The peak velocity of the tricuspid regurgitant jet is not obtainable.  Pulmonary artery systolic pressure cannot be assessed.     Pulmonic Valve  The pulmonic valve cannot be assessed.     Vessels  The aorta root is normal. Inferior vena cava appers dilated measuring 2.4 cm.  Ascending aorta 3.3 cm.     Pericardium  No pericardial effusion is present.     _____________________________________________________________________________  __  MMode/2D Measurements & Calculations  IVSd: 1.5 cm  LVIDd: 5.4 cm  LVIDs: 3.7 cm  LVPWd: 1.3 cm  FS: 30.5 %  LV mass(C)d: 323.5 grams  LV mass(C)dI: 118.2 grams/m2     asc Aorta Diam: 3.3 cm  LVOT diam: 2.2 cm  LVOT area: 4.0 cm2  LA Volume (BP): 71.6 ml  LA Volume Index (BP): 26.1 ml/m2  RWT: 0.47        Doppler Measurements & Calculations  MV E max emile: 67.4 cm/sec  MV A max emile: 88.8 cm/sec  MV E/A: 0.76  MV dec time: 0.17 sec  Ao V2 max: 203.2 cm/sec  Ao max P.0 mmHg  MERON(V,D): 2.3 cm2     LV V1 max P.5 mmHg  LV V1 max: 116.8 cm/sec  AV Emile Ratio (DI): 0.57  E/E' av.0  Lateral E/e': 13.4  Medial E/e': 12.6     _____________________________________________________________________________  __           Report approved by: Tariq Pappas 2018 10:50 AM          CXR:  Recent Results (from the past 24 hour(s))   XR Chest Port 1 View    Narrative    Exam: XR CHEST PORT 1 VW, 2019 1:33 AM    Indication: ECMO    Comparison: 2019    Findings:   Endotracheal tube tip projects over the mid trachea. Gastric tube and  enteric tube course below the field of view. Right-sided central  venous catheter tip projects over the superior cavoatrial junction.  Heart is enlarged. Improved opacities in the right chest. Unchanged  left basilar consolidation versus atelectasis. Bilateral pleural  effusions, left larger than right. No pneumothorax.      Impression    Impression:   1. Increased left pleural  effusion.  2. Improved pulmonary edema with bibasilar atelectasis versus  infection .         Labs:  Recent Labs   Lab 09/17/19  0334 09/17/19  0142 09/17/19  0009 09/16/19  2150   PH 7.44 7.45 7.46* 7.45   PCO2 47* 46* 44 46*   PO2 108* 89 80 85   HCO3 32* 31* 31* 32*   O2PER 60.0 60.0 60.0 60.0       Lab Results   Component Value Date    HGB 8.1 (L) 09/17/2019    PHGB 30 (H) 09/17/2019    PLT 91 (L) 09/17/2019    FIBR 941 (H) 09/17/2019    INR 1.20 (H) 09/17/2019    PTT 88 (H) 09/17/2019    DD 12.1 (H) 09/17/2019    AXA 0.39 09/17/2019    ANTCH 57 (L) 09/16/2019         Plan is to decannulate today - pt is first case on the OR schedule, at 0800.      Kitty Abdullahi, RT, RRT  9/17/2019 6:13 AM

## 2019-09-17 NOTE — PLAN OF CARE
D/I:     Neuro: CHU slightly but not to request. Pupils 3 to 4+ and brisk. Versed Gtt @ 6 mg/hr. Precedex Gtt @ 1 mcg/kg/min. Fentanyl Gtt @ 100 mcg/hr.      Cardiac: SR 70 's to 80's  with no ectopy. Dobutamine Gtt @ 2.5 mcg/kg/min. Levo Gtt  started  0600 to keep MAP > 65 and currently running @ 0.05 mcg/kg/min. ECMO continues.    Resp: Large amount of thin pink frothy and thick dark red secretions suctioned out of ETT tube this shift. CXR shows pulmonary edema.  Vent CMV FIO2 60%. TV= 450. RR= 12. PEEP= 8.    : Last BM prior to admission. Bowel sounds hypoactive. Scheduled Senokot and Miralax given. Bisacodyl Suppository given near end of previous shift and small amount of clear mucous from rectum this shift. Tube feeding continues   @ goal rate of 65 ml/hr. Free water of 75 ml/hr also down feeding tube.     GI: Scheduled Lasix 60 mg IV Q 8 hr given. Urine output in french variable but averaging 350 ml/hr.    Skin: No new issues. Excoriated areas in deep skin folds under breasts, pannus and groin folds. Intact serous blister in mid lower back.    A/P:     Pt add on OR schedule determined at end to this shift to be 1st OR case today and will go to OR @ 0800 this morning for ECMO decannulation.

## 2019-09-17 NOTE — PLAN OF CARE
D/I: returned to 4E from OR @ 12:45 s/p change from VA ECMO to VV ECMO , cannulated via LFV and RIJ. Unable to decannulate r/t poor oxygenation. Arrived intubated, sedated w/ Epi, Dobutamine, Heparin, Fentanyl, Precedex and Versed gtts infusing. In Afib 80s-110 upon arrival. Converted to SR 70s ~ 15:00. Epi off and restarted Levophed per MD order. Opens eyes to stimulation and moving arms slightly. Does not follow commands. Small amt bloody ETT secretions. Small amt dark red OGT output. Diuresing well w/ schedulewd Lasix IV.  Family here and updated by MD.

## 2019-09-17 NOTE — PROGRESS NOTES
Antimicrobial Stewardship Team Note    Antimicrobial Stewardship Program - A joint venture between [facility] and  Physicians to optimize antibiotic management.  NOT a formal consult - Restricted Antimicrobial Review     Patient: Shira Rodriguez  MRN: [id number]  Allergies: Adhesive tape and Erythromycin    Brief Summary: Shira Rodriguez is a 59 year-old female with a PMH of HTN, HLD, CYRUS, morbid obesity, and chronic bronchitis. She was transferred from [facility] to [facility] on 9/9/19 for [facility]-ECMO support and IR intervention d/t massive bilateral pulmonary embolism. She initially presented to [facility] ED w/SOB, cyanosis, and hypoxia requiring intubation. Arrested (x2) with ROSC and systemic TPA administered for massive pulmonary emboli protocol. At [facility], a leukocytosis (23.1) was noted but c/w arrest. CT chest demonstrated small left pleural effusion and nonspecific patchy consolidation and infiltrate in both lungs, may be related to atelectasis. IR catheretization performed s/p suction thrombectomy of central R pulmonary thrombus, no significant removal from L pulmonary artery and s/p bilateral infusion catheter placement (TPA infusions completed 9/10). Peripheral blood cultures drawn before antibiotics (x2) were negative.    Empiric vancomycin and Zosyn were started on 9/10 for ECMO. CT chest/abd/pel noted patchy nodularity in right lung base suggesting aspiration. MRSA PCR came back negative and ET sputum obtained before antibiotics was positive for MSSA (+ on 9/11 and 9/12 sputum as well). Decannulation attempt same day but held due to white-out of left lung (pleural effusion vs plugging vs infectious vs dysfunctional lymph drainage). Vancomycin was stopped on 9/14 and Zosyn was to be continued for a total of 10 days. On 9/16, vancomycin was restarted and Zosyn was switched to cefazolin to complete a 10-day therapy for MSSA aspiration pneumonia.    To date, patient continues to thick  cloudy/tan/blood tinged secretions from ETT. Currently, patient is afebrile and hemodynamically stable. Since admission, patient's oxygenation requirements has not improved (FiO2 60, PEEP 8) and CXR suggest stable pulmonary edema with bibasilar atelectasis vs. infection. Leukocytosis has decreased from 23.1 to 14.9 and last positive culture was a sputum culture from 9/12. Patient will undergo decannulation today.       Active Anti-infective Medications   (From admission, onward)                Start     Stop    09/16/19 2300  vancomycin (VANCOCIN) injection  3,000 mg (central catheter),   Intravenous,   EVERY 24 HOURS     Aspiration Pneumonia    ECMO        09/21/19 2259 09/16/19 2300  ceFAZolin-dextrose  2 g,   Intravenous,   200 mL/hr,   EVERY 8 HOURS     Aspiration Pneumonia        09/25/19 2159          Assessment: MSSA aspiration pneumonia.  Although patient has not decreased oxygenation requirements and continues to produce secretions indicative of pulmonary injury, overall clinical status of the patient has stabilized. Infectious workup continues to improve (afebrile, downtrending WBC, and stable radiograph impressions). Since culturing MSSA species, patient has been adequately covered since 9/10 (total of 8 days). Agree with switch from Zosyn to cefazolin to narrow spectrum to target the isolated pathogen. Discontinue vancomycin since cultured species to methicillin-sensitive and ongoing vancomycin can select for more resistant organisms.     Recommendations:  Discontinue vancomycin  Continue cefazolin 2g IV Q8 hours for an additional 7 days to complete 2 weeks total of therapy (end date 9/23/19).  Continue symptoms management as directed.    Discussed with ID Staff  MD Jessica Johnston [facility]    Kim Juárez, Quita[employer] IV Student  P: [phone]  Pager: [phone]    Vital Signs/Clinical Features:  Vitals         09/15 0700  -  09/16 0659 09/16 0700  -  09/17 0659 09/17 0700  -  09/17 1601   Most Recent     Temp ( F) 96.4 -  99.9    94.6 -  100.2    97.3 -  99     97.3 (36.3)    Heart Rate 59 -  96    65 -  99    77 -  108     82    Resp 16 -  29    18 -  28    12 -  21     12    SpO2 (%) 93 -  100    96 -  100    99 -  100     100            Labs  Estimated Creatinine Clearance: 126.4 mL/min (based on SCr of 0.83 mg/dL).  Recent Labs   Lab Test 09/16/19  0350 09/16/19  0952 09/16/19  1551 09/16/19  2151 09/17/19  0334 09/17/19  1257   CR 0.97 0.94 0.90 0.94 0.92 0.83       Recent Labs   Lab Test 03/05/18  0733 06/04/18  1200 02/14/19  0745 04/23/19  1440  09/09/19  1652  09/10/19  0235  09/16/19  0350 09/16/19  0952 09/16/19  1551 09/16/19  2151 09/17/19  0334 09/17/19  0910 09/17/19  1037 09/17/19  1155 09/17/19  1257   WBC 7.4 7.5 8.9 9.5   < > 23.1*  --  34.4*   < > 14.4* 16.5* 14.9* 15.1* 14.9*  --   --   --  17.5*   ANEU 5.2 5.5 6.2 6.9  --  19.5*  --  30.8*  --   --   --   --   --   --   --   --   --   --    ALYM 1.3 1.4 1.8 1.7  --  1.7  --  1.0  --   --   --   --   --   --   --   --   --   --    REGINALD 0.5 0.5 0.6 0.4  --  1.2  --  1.3  --   --   --   --   --   --   --   --   --   --    AEOS 0.3 0.2 0.2 0.4  --  0.0  --  0.1  --   --   --   --   --   --   --   --   --   --    HGB 16.1* 15.8* 15.9* 16.1*   < > 16.7*   < > 14.5   < > 8.8* 8.9* 8.2* 8.2* 8.1* 9.2* 9.2* 9.2* 8.8*   HCT 48.8* 47.5* 48.7* 49.7*   < > 51.9*  --  47.2*   < > 28.5* 27.7* 25.5* 26.0* 25.4*  --   --   --  27.9*   MCV 96 96 95 97   < > 95  --  96   < > 93 93 93 94 92  --   --   --  93    214 307 259   < > 263  --  189   < > 71* 99* 99* 107* 91*  --   --   --  100*    < > = values in this interval not displayed.       Recent Labs   Lab Test 09/16/19  0350 09/16/19  0952 09/16/19  1551 09/16/19  2151 09/17/19  0334 09/17/19  1257   BILITOTAL 1.0 1.2 0.8 0.8 0.7 0.8   ALKPHOS 96 91 84 88 82 84   ALBUMIN 2.3* 2.2* 2.1* 2.2* 2.1* 2.3*   AST 18 22 17 17 14 15   ALT 34 35 30 30 27 24       Recent Labs   Lab Test 09/10/19  0440   09/11/19  0346  09/12/19  0342  09/13/19  0359  09/14/19  0345  09/15/19  0350  09/16/19  0350  09/16/19  2150 09/17/19  0334 09/17/19  0910 09/17/19  1037 09/17/19  1155 09/17/19  1257   PCAL 17.75*  --  13.15*  --   --   --   --   --   --   --   --   --   --   --   --   --   --   --   --   --    LACT 9.0*   < > 1.4   < > 0.8   < > 0.5*   < >  --    < > 0.4*   < > 0.5*   < > 0.4* 0.4* 0.4* 0.6* 0.8 0.9   CRP 66.0*  --  97.0*  --  91.0*  --  97.0*  --  100.0*  --  100.0*  --  140.0*  --   --  180.0*  --   --   --   --    SED 8  --  11  --  39*  --  65*  --  85*  --  72*  --  86*  --   --  107*  --   --   --   --     < > = values in this interval not displayed.       Recent Labs   Lab Test 09/15/19  2107   VANCOMYCIN 13.8       Culture Results:  7-Day Micro Results       Procedure Component Value Units Date/Time    Bronchial Culture Aerobic Bacterial [[id number]] Collected:  09/14/19 1441    Order Status:  Completed Lab Status:  Final result Updated:  09/16/19 1050    Specimen:  Bronchial aspirate      Specimen Description Bronchial aspirate     Culture Micro No growth    Gram stain [[id number]] Collected:  09/14/19 1441    Order Status:  Completed Lab Status:  Final result Updated:  09/14/19 2030    Specimen:  Bronchial aspirate      Specimen Description Bronchial aspirate     Gram Stain >25 PMNs/low power field      No organisms seen    Sputum Culture Aerobic Bacterial [[id number]] Collected:  09/14/19 0345    Order Status:  Completed Lab Status:  Final result Updated:  09/16/19 0818    Specimen:  Sputum      Specimen Description Sputum     Special Requests Endotracheal     Culture Micro No growth    Sputum Culture Aerobic Bacterial [[id number]] Collected:  09/13/19 0558    Order Status:  Completed Lab Status:  Final result Updated:  09/15/19 1246    Specimen:  Sputum      Specimen Description Sputum Endotracheal     Culture Micro No growth    Blood culture [[id number]] Collected:  09/13/19 0316    Order Status:  Completed Lab Status:   Preliminary result Updated:  09/17/19 0716    Specimen:  Blood      Specimen Description Blood Left Hand     Special Requests Aerobic and anaerobic bottles received     Culture Micro No growth after 4 days    Blood culture [[id number]] Collected:  09/12/19 0602    Order Status:  Completed Lab Status:  Preliminary result Updated:  09/17/19 0716    Specimen:  Blood      Specimen Description Blood Right Hand     Special Requests Aerobic and anaerobic bottles received     Culture Micro No growth after 5 days    Sputum Culture Aerobic Bacterial [[id number]]  (Abnormal)  (Susceptibility) Collected:  09/12/19 0554    Order Status:  Completed Lab Status:  Final result Updated:  09/15/19 0450    Specimen:  Sputum      Specimen Description Sputum Endotracheal     Culture Micro Heavy growth  Staphylococcus aureus      Susceptibility       Staphylococcus aureus (1)       Antibiotic Interpretation Sensitivity Method Status    CIPROFLOXACIN [*]  Sensitive <=0.5 ug/mL JEFFREY Final    CLINDAMYCIN Resistant   JEFFREY Final     This isolate is presumed to be clindamycin resistant based on detection of   inducible clindamycin resistance. Erythromycin and clindamycin are resistant,   therefore, they are not recommended for use.    ERYTHROMYCIN Resistant 1 ug/mL JEFFREY Final    GENTAMICIN Sensitive <=0.5 ug/mL JEFFREY Final    LEVOFLOXACIN [*]  Sensitive 0.25 ug/mL JEFFREY Final    OXACILLIN Sensitive <=0.25 ug/mL JEFFREY Final    TETRACYCLINE Sensitive <=1 ug/mL JEFFREY Final    Trimethoprim/Sulfa Sensitive <=0.5/9.5 ug/mL JEFFREY Final    VANCOMYCIN Sensitive 1 ug/mL JEFFREY Final    RIFAMPIN [*]  Sensitive <=0.5 ug/mL JEFFREY Final    TIGECYCLINE [*]  Sensitive <=0.12 ug/mL JEFFREY Final    LINEZOLID [*]  Sensitive 2 ug/mL JEFFREY Final    Quinupristin/Dalfopr [*]  Sensitive <=0.25 ug/ml JEFFREY Final    MOXIFLOXACIN [*]  Sensitive <=0.25 ug/mL JEFFREY Final    Cefoxitin Screen [*]  Sensitive Negative  JEFFREY Final               [*]   Suppressed Antibiotic                   Sputum Culture Aerobic  Bacterial [[id number]]  (Abnormal) Collected:  09/11/19 0600    Order Status:  Completed Lab Status:  Final result Updated:  09/13/19 0757    Specimen:  Sputum      Specimen Description Sputum Endotracheal     Culture Micro Heavy growth  Staphylococcus aureus  Susceptibility testing done on previous specimen      Blood culture [[id number]] Collected:  09/11/19 0345    Order Status:  Completed Lab Status:  Final result Updated:  09/17/19 0715    Specimen:  Blood      Specimen Description Blood Right Hand     Special Requests Aerobic and anaerobic bottles received     Culture Micro No growth            Recent Labs   Lab Test 06/01/12  1015 04/11/17  0958 09/18/17  1634 09/10/19  0611   URINEPH 5.5 6.0 5.0 6.0   NITRITE Negative Negative Negative Negative   LEUKEST Negative Negative Negative Negative   WBCU 1 2 1 2                         Imaging: Us Lower Extremity Arterial Duplex Bilateral    Result Date: 9/11/2019  Exam: Duplex ultrasound of bilateral lower extremity arteries dated 9/10/2019 4:25 PM Clinical information: Baseline to assess blood flow to Lower Extremities ([facility] ECMO) Comparison: None Technique: Includes Gray Scale images, color Doppler, spectral Doppler waveforms and velocities with appropriate angles of 60 degrees or less. Ordering provider: BRAD DC Findings: Right lower extremity: Common femoral artery: Obscured Deep femoral artery: Obscured Proximal SFA: 92 cm/sec. Mid SFA: 118 cm/sec. Distal SFA: 85 cm/sec. Popliteal artery: 89 cm/sec. PTA ankle: 81 cm/sec. AG ankle: 84 cm/sec. Waveforms: Abnormal monophasic waveforms with persistent diastolic flow consistent with ECMO Left lower extremity: Common femoral artery: Obscured Deep femoral artery: Obscured Proximal SFA: 42 cm/sec. Mid SFA: 49 cm/sec. Distal SFA: 97 cm/sec. Popliteal artery: 80 cm/sec. PTA ankle: 62 cm/sec. AG ankle: 53 cm/sec. Waveforms: Abnormal monophasic waveforms with persistent diastolic flow consistent with ECMO      Impression: Patent Doppler evaluation of the lower extremity arteries with waveforms associated with ECMO. Guidelines: [facility] duplex criteria for lower limb arterial occlusive disease -Percent stenosis- Normal (1-19%): Peak systolic velocity (cm/s): <150, End-diastolic velocity (cm/s): <40, Velocity ration (Vr): <1.5, Distal arterial waveform: Triphasic -Percent stenosis- 20-49%: Peak systolic velocity (cm/s): 150-200, End-diastolic velocity (cm/s): <40, Velocity ration (Vr): 1.5-2.0, Distal arterial waveform: Triphasic -Percent stenosis- 50-75%: Peak systolic velocity (cm/s): 200-300, End-diastolic velocity (cm/s): <90, Velocity ration (Vr): 2.0-3.9, Distal arterial waveform: Poststenotic turbulence distal to stenosis, monophasic distal waveform -Percent stenosis- >75%: Peak systolic velocity (cm/s): >300, End-diastolic velocity (cm/s): <90, Velocity ration (Vr): >4.0, Distal arterial waveform: Dampened distal waveform and low PSV/EDV* in the stenosis -Percent stenosis- Occlusion: Absent flow by color Doppler/pulsed Doppler spectral analysis; length of occlusion estimated from distance between exit and reentry collateral arteries *PSV = peak systolic velocity, EDV = end-diastolic velocity http://link.paiz.com/chapter/10.1007/[email or web address]/fulltext html I have personally reviewed the examination and initial interpretation and I agree with the findings. ABBY SAUCEDO MD    Xr Chest Port 1 View    Result Date: 9/17/2019  Exam: XR CHEST PORT 1 VW, 9/17/2019 1:33 AM Indication: ECMO Comparison: 9/16/2019 Findings: Endotracheal tube tip projects over the mid trachea. Gastric tube and enteric tube course below the field of view. Right-sided central venous catheter tip projects over the superior cavoatrial junction. Heart is enlarged. Stable bilateral mixed interstitial and patchy airspace opacities. Unchanged left basilar consolidation versus atelectasis. Bilateral pleural  effusions, left larger than right. No pneumothorax.     Impression: 1. Increased left pleural effusion. 2. Stable pulmonary edema with bibasilar atelectasis versus infection . I have personally reviewed the examination and initial interpretation and I agree with the findings. CANDIDA ANTONIO, DO    Xr Chest Port 1 View    Result Date: 9/16/2019  Exam: XR CHEST PORT 1 VW, 9/16/2019 1:15 AM Indication: Check endotracheal tube placement and ECLS cannula placement. DO NOT log-roll patient.  Place film under patient using patient safety handling process. Comparison: 9/15/2019 Findings: Endotracheal tube tip projects over the mid trachea. Gastric tube and enteric tube course below the field of view. Right-sided central venous catheter tip projects over the superior cavoatrial junction. Heart is enlarged. Increased opacities in the right chest. Unchanged left basilar consolidation versus atelectasis. Bilateral pleural effusions, left larger than right. No pneumothorax.     Impression: Unchanged pulmonary edema with bibasilar atelectasis versus infection and associated pleural effusions. I have personally reviewed the examination and initial interpretation and I agree with the findings. LILIANA BONNER MD    Xr Chest Port 1 View    Result Date: 9/15/2019  Exam: XR CHEST PORT 1 VW, 9/15/2019 6:37 AM Indication: Check endotracheal tube placement and ECLS cannula placement. Comparison: CT 9/14/2019, chest x-ray 9/14/2019 Findings: Patient is rotated. Endotracheal tube tip projects over the mid trachea. Gastric tube and enteric tube course below the field of view. Right-sided central venous catheter tip projects over the superior cavoatrial junction. Heart is enlarged. Increased opacities in the right chest. Improved aeration of the left upper lung, with persistent left basilar consolidation versus atelectasis. Bilateral pleural effusions, left larger than right. No pneumothorax.     Impression: Increased opacities in the  right lung. Improved aeration of the left upper lung with persistent left basilar consolidation versus atelectasis. Bilateral pleural effusions, left greater than right. JUSTEN CANO MD    Xr Chest Port 1 View    Result Date: 9/14/2019  EXAM: XR CHEST PORT 1 VW  9/14/2019 3:05 AM  HISTORY: Check endotracheal tube placement and ECLS cannula placement. DO NOT log-roll patient.  Place film under patient using patient safety handling process. COMPARISON: Radiographs 9/13/2019 FINDINGS: Supine radiograph of the chest. Enteric tube courses beyond the field-of-view. Endotracheal tube tip projects over the midthoracic trachea. Unchanged inferior approach cannulation. Right IJ central line tip projects over the superior cavoatrial junction. The trachea is midline. The cardiac silhouette is obscured. Increased diffuse attenuation of the left hemithorax, new since 913 0416 hours exam and similar to 913 0127 hours exam. Increased right basilar and perihilar opacities. Upper abdomen is unremarkable.     IMPRESSION: 1. Increased diffuse attenuation of the left hemithorax, similar to yesterday's early-morning radiograph, again suggesting mucous plugging. Consider suction and repeating radiograph. 2. Increased right perihilar and basilar opacities, suggesting worsening pulmonary edema and/or atelectasis. [Urgent Result: Increased attenuation of the left hemithorax suggesting mucous plugging.] Finding was identified on 9/14/2019 4:54 AM. Station 4E ELLIS Garza was contacted by Dr. Oden at 9/14/2019 4:56 AM and verbalized understanding of the urgent finding and will suction and repeat radiograph. I have personally reviewed the examination and initial interpretation and I agree with the findings. LAURA ESCOBEDO MD    Xr Chest Port 1 View    Result Date: 9/14/2019  EXAM: XR CHEST PORT 1 VW  9/14/2019 7:01 AM  HISTORY: 59 yof w/ b/l PE and staph pna recently on [facility] ECMO. Recent imaging c/w mucus plugging, now s/p suctioning. Michellal  for interval improvement COMPARISON: 9/14/2019 0234 hours FINDINGS: Semiupright AP radiograph of the chest. Endotracheal tube tip projects over the midthoracic trachea. Enteric tubes course beyond the field-of-view. Right IJ central line tip projects over the superior cavoatrial junction. The trachea is midline. The cardiac silhouette is partially obscured. Improved aeration in the left upper lobe. The remaining significant attenuation in the left hemithorax. Unchanged patchy right perihilar and basilar opacities. Upper abdomen is unremarkable.     IMPRESSION: 1. Mildly improved aeration in the left upper lobe, however there remains significant attenuation in the left hemithorax, suggesting segmental atelectasis/consolidation with left pleural effusion, increased from 9/13/2019. 2. Unchanged patchy right perihilar and basilar opacities suggesting atelectasis and/or pulmonary edema. I have personally reviewed the examination and initial interpretation and I agree with the findings. LAURA ESCOBEDO MD    Xr Chest Port 1 View    Result Date: 9/13/2019  EXAM: XR CHEST PORT 1 VW  9/13/2019 1:40 AM  HISTORY: Check endotracheal tube placement and ECLS cannula placement. DO NOT log-roll patient.  Place film under patient using patient safety handling process. COMPARISON: 9/12/2019 FINDINGS: Supine radiograph of the chest. Endotracheal tube tip projects over the midthoracic trachea. Enteric tubes course beyond the field-of-view, feeding tube coils once within the stomach body. Gastric tube sidehole projects with its stomach body. Unchanged small cylindrical density at the level of the thoracic inlet. The trachea is midline. The cardiac silhouette is obscured. Increased now complete opacification of the left hemithorax, with subtle bronchograms. Patchy right perihilar and basilar opacities are unchanged. Upper abdomen is unremarkable.     IMPRESSION: 1. Increased now complete opacification of left hemithorax, suspect  component of mucus plugging with lobar atelectasis. Consider suction and repeating radiograph. Alternatively, this may represent worsening of the previously seen left pleural effusion. 2. Unchanged patchy right perihilar and basilar opacities suggesting pulmonary edema. [Urgent Result: New opacification of left hemithorax, suspect mucous plugging with atelectasis.] Finding was identified on 9/13/2019 2:56 AM. Station 4E RN Ramez was contacted by Dr. Oden at 9/13/2019 3:01 AM and verbalized understanding of the urgent finding. I have personally reviewed the examination and initial interpretation and I agree with the findings. LAURA ESCOBEDO MD    Xr Chest Port 1 View    Result Date: 9/13/2019  EXAM: XR CHEST PORT 1 VW  9/13/2019 4:26 AM  HISTORY: intubated, pls repeat prior study with better body positioning COMPARISON: 9/13/2019 0127 hours FINDINGS: Supine AP radiograph of the chest. Endotracheal tube tip projects over the midthoracic trachea. Enteric tubes course beyond the field-of-view. Right IJ central line tip projects over the superior cavoatrial junction. The trachea is midline. The cardiac silhouette is enlarged. Decreased attenuation of the left hemithorax. Moderate left pleural effusion appears similar to yesterday's radiograph. Dense left retrocardiac opacity is unchanged. Unchanged right perihilar and basilar opacities.     IMPRESSION: 1. Diffuse attenuation of the left hemithorax seen on 0127 hours radiograph has resolved. Moderate left pleural effusion and retrocardiac atelectasis/consolidation is similar to yesterday's radiograph. 2. Unchanged right perihilar and basilar opacities suggesting pulmonary edema. I have personally reviewed the examination and initial interpretation and I agree with the findings. LAURA ESCOBEDO MD    Xr Chest Port 1 View    Result Date: 9/12/2019  EXAM: XR CHEST PORT 1 VW  9/12/2019 1:20 AM  HISTORY: Check endotracheal tube placement and ECLS cannula placement. DO  NOT log-roll patient.  Place film under patient using patient safety handling process. COMPARISON: 9/11/2019 FINDINGS: Supine radiograph of the chest. The projection is rotated. Right IJ central line tip projects over the low SVC. Enteric tube courses beyond the field-of-view. Endotracheal tube projects over the low thoracic trachea. Temperature probe projects over the hypopharynx. The trachea is midline. The cardiac silhouette is enlarged. Increased moderate left pleural effusion. Unchanged dense left retrocardiac opacities. Mildly increased right basilar patchy opacities.     IMPRESSION: 1. Increased moderate left pleural effusion. 2. Mildly increased pulmonary edema. 3. Unchanged dense left retrocardiac atelectasis/consolidation. I have personally reviewed the examination and initial interpretation and I agree with the findings. LAURA ESCOBEDO MD    Xr Chest Port 1 View    Result Date: 9/11/2019  XR CHEST PORT 1 VW  9/11/2019 8:44 AM  HISTORY: ECMO and intubated COMPARISON: Same-day chest radiograph FINDINGS: Single AP radiograph of the chest. Underpenetrated study. Endotracheal tube tip is positioned over the mid thoracic trachea. Right IJ central line with tip overlying the mid to low SVC. Enteric and feeding tubes course beyond the field-of-view, with the enteric tube sidehole projecting over the left upper quadrant. Inferior approach ECMO catheter overlying the right atrium. Unchanged cardiomegaly. Lung volumes are similar with unchanged diffuse mixed interstitial and patchy airspace opacities. New left upper lobe airspace consolidation. Unchanged retrocardiac opacities. Unchanged small left pleural effusion. No pneumothoraces.     IMPRESSION: 1. New left upper lobe airspace consolidation 2. Unchanged left basilar opacities and pleural effusion. 3. Stable support devices. I have personally reviewed the examination and initial interpretation and I agree with the findings. DANY GIRALDO MD    Xr Chest Port 1  View    Result Date: 9/11/2019  EXAM: XR CHEST PORT 1 VW  9/11/2019 1:15 AM  HISTORY: Check endotracheal tube placement and ECLS cannula placement. DO NOT log-roll patient.  Place film under patient using patient safety handling process. COMPARISON: 9/10/2019 FINDINGS: Supine radiograph of the chest. Endotracheal tube tip projects over the midthoracic trachea. Enteric tubes course beyond the field-of-view, Aspen tube sidehole projects at the level of the gastroesophageal junction. Right IJ central line tip projects over the mid SVC. Temperature probe tip projects above the level of the thoracic inlet. Unchanged inferior approach ECMO cannulation. Removal of the inferior approach bilateral pulmonary arterial lines. The trachea is midline. The cardiac silhouette is enlarged. Small left pleural effusion. Unchanged dense left retrocardiac opacity. No pneumothorax. Upper abdomen is unremarkable.     IMPRESSION: 1. Unchanged small left pleural effusion with dense left retrocardiac atelectasis/consolidation. 2. Presumed multichamber cardiomegaly. 3. Interval removal of inferior approach cardiac electronic device. I have personally reviewed the examination and initial interpretation and I agree with the findings. LAURA ESCOBEDO MD    Xr Abdomen Port 1 View    Result Date: 9/17/2019  Examination:  XR ABDOMEN PORT 1 VW 9/17/2019 1:48 PM Comparison: 9/11/2019, 9/10/2019 History: eval ft placement Technique: Supine radiograph of the abdomen. Findings: Gastric tube tip and sidehole project over the expected location of stomach. Feeding tube tip projects over the expected location of the fourth portion of duodenum. Nonobstructed bowel gas pattern. No pneumatosis. Partially visualized ethmoid cannula.     Impression: Feeding tube tip projects over the expected location of the fourth portion of duodenum. I have personally reviewed the examination and initial interpretation and I agree with the findings. LILIANA RATLIFF    Xr  Abdomen Port 1 View    Result Date: 9/11/2019  EXAM: XR ABDOMEN PORT 1 VW  9/11/2019 1:15 AM  HISTORY: OG placement COMPARISON: 9/10/2019 FINDINGS: Supine radiograph of the abdomen. Gastric tube tip projects over the stomach, sidehole projects at the level of the gastroesophageal junction. Feeding tube is not fully included in the field-of-view, tip partially visualized at the inferior margins of the study. Inferior approach ECMO cannulation tip projects over the inferior cavoatrial junction. Partially visualized catheter tip projects over the low SVC. Kidneys are partially visualized and dense in appearance, presumably retained contrast from prior angiography. Relative paucity of bowel gas. Small left pleural effusion with left basilar opacities.     IMPRESSION: Gastric tube sidehole projects above the level of the gastroesophageal junction. Recommend advancing 5 - 10 cm. I have personally reviewed the examination and initial interpretation and I agree with the findings. LAURA ESCOBEDO MD    Ct Chest W/o Contrast    Result Date: 9/14/2019  CT CHEST W/O CONTRAST 9/14/2019 6:11 PM Comparison: CT cap 9/10/2019, CT chest 9/9/2018 , CT abdomen pelvis 4/12/2017, 7/9/2009. History: Pleural effusion Technique: CT of the chest obtained without intravenous contrast. Axial, coronal, and sagittal reconstructions were obtained and reviewed. Findings: Chest: The heart size is enlarged. No significant pericardial effusion. Right IJ central venous catheter tip terminates in the low SVC. Inferior approach ECMO cannula tip terminates in the low right atrium. Mild calcification of the aortic and mitral annulus. No enlarged mediastinal lymph nodes. Endotracheal tube tip terminates above the iain. Bowing of the posterior membrane of the trachea and narrowing of both main stem bronchi. Extensive groundglass and patchy consolidative opacities in the right lung, with patchy groundglass and depending consolidation in the left lung.  Small bilateral pleural effusions, not significantly changed from the prior CT on the left, and increased on the right. Upper abdomen: Evaluation of the upper abdomen is limited. Enteric tube is looped in the stomach and continues below the inferior field of view of the scan. Thickening of the adrenal glands, with an unchanged right adrenal adenoma (previously characterized 7/9/2009). Bones: Unchanged bilateral fifth and sixth anterolateral rib fractures. There is also a nondisplaced sternal fracture, with an enlarged overlying hematoma measuring up to 3.5 x 3.3 cm.     Impression: 1. Unchanged small left pleural effusion, and increased small right pleural effusion. 2. Increased patchy groundglass and consolidative opacities in the lungs, which may represent pulmonary infarction, pulmonary edema, infection, or hemorrhage. 3. Narrowing of the distal trachea and mainstem bronchi, which is at least in part due to acquisition during expiration. However, there appears to also be a component of tracheobronchomalacia. 4. Unchanged bilateral rib fractures. There is also a nondisplaced fracture of the mid sternal body, with an increased overlying subcutaneous hematoma. I have personally reviewed the examination and initial interpretation and I agree with the findings. FLOR MCCRAY MD

## 2019-09-17 NOTE — BRIEF OP NOTE
[facility], [place]    Brief Operative Note    Pre-operative diagnosis: Pulmonary Embolism  Post-operative diagnosis * No post-op diagnosis entered *  Procedure: Procedure(s):  Venous-Venous cannulation using ultrasound guidance and transesophageal echocardiogram, venous-arterial ecmo decannulation and repair of left femoral artery  Bronchoscopy flexible  Surgeon: Surgeon(s) and Role:     * Patrick Rayo MD - Primary     * Zaki Lopez MD - Resident - Assisting  Anesthesia: General   Estimated blood loss: 500 ml  Drains: Left femoral venous and right internal jugular venous ECMO cannulas  Specimens: * No specimens in log *  Findings:   None.  Complications: None.  Implants:  * No implants in log *     Signed:    Zaki Lopez MD 9/17/2019 at 12:05 PM  General Surgery Resident  Pager: [phone]

## 2019-09-17 NOTE — ANESTHESIA PROCEDURE NOTES
LETA Probe Insertion Note:    Inserted by:  Jeramie Tavares MD (Responsible Anesthesiologist)  Probe Number: [id number]  Probe Status PRE Insertion: NO obvious damage  Probe type:  Adult 3D    Bite block used:   Yes  Insertion Technique: Easy, no oropharyngeal manipulation  Insertion complications: None obvious    Billing Report:LETA report by Anesthesiologist (See Separate Report note)    Probe Status POST Removal: NO obvious damage

## 2019-09-17 NOTE — ANESTHESIA CARE TRANSFER NOTE
Patient: Shira Rodriguez    Procedure(s):  Venous-Venous cannulation using ultrasound guidance and transesophageal echocardiogram, venous-arterial ecmo decannulation and repair of left femoral artery  Bronchoscopy flexible    Diagnosis: Pulmonary Embolism  Diagnosis Additional Information: No value filed.    Anesthesia Type:   General     Note:    Patient transferred to:ICU  Comments: Anesthesia Care Transfer Note    Patient: Shira Rodriguez    Transferred to: ICU    Patient vital signs: stable    Airway: ETT    Transferred to ICU, monitored, sedated, O2 100% via ambu, veno-venous ecmo. Monitors on, Hooked to vent, report to RN. Respiratory and perfusion present to connect ECMO    Karina Lovelace CRNA  9/17/2019 12:35 PM    ICU Handoff: Call for PAUSE to initiate/utilize ICU HANDOFF, Identified Patient, Identified Responsible Provider, Reviewed the Pertinent Medical History, Discussed Surgical Course, Reviewed Intra-OP Anesthesia Management and Issues during Anesthesia, Set Expectations for Post Procedure Period and Allowed Opportunity for Questions and Acknowledgement of Understanding      Vitals: (Last set prior to Anesthesia Care Transfer)    CRNA VITALS  9/17/2019 1152 - 9/17/2019 1235      9/17/2019             Resp Rate (observed):  16    Resp Rate (set):  16                Electronically Signed By: OK Benitez CRNA  September 17, 2019  12:35 PM

## 2019-09-18 ENCOUNTER — APPOINTMENT (OUTPATIENT)
Dept: GENERAL RADIOLOGY | Facility: CLINIC | Age: 59
DRG: 003 | End: 2019-09-18
Payer: COMMERCIAL

## 2019-09-18 LAB
ALBUMIN SERPL-MCNC: 2.1 G/DL (ref 3.4–5)
ALBUMIN SERPL-MCNC: 2.2 G/DL (ref 3.4–5)
ALP SERPL-CCNC: 88 U/L (ref 40–150)
ALP SERPL-CCNC: 95 U/L (ref 40–150)
ALT SERPL W P-5'-P-CCNC: 17 U/L (ref 0–50)
ALT SERPL W P-5'-P-CCNC: 19 U/L (ref 0–50)
ANION GAP SERPL CALCULATED.3IONS-SCNC: 5 MMOL/L (ref 3–14)
ANION GAP SERPL CALCULATED.3IONS-SCNC: 5 MMOL/L (ref 3–14)
ANION GAP SERPL CALCULATED.3IONS-SCNC: 7 MMOL/L (ref 3–14)
ANION GAP SERPL CALCULATED.3IONS-SCNC: 7 MMOL/L (ref 3–14)
APTT PPP: 118 SEC (ref 22–37)
APTT PPP: 139 SEC (ref 22–37)
APTT PPP: 84 SEC (ref 22–37)
APTT PPP: 84 SEC (ref 22–37)
APTT PPP: 85 SEC (ref 22–37)
AST SERPL W P-5'-P-CCNC: 15 U/L (ref 0–45)
AST SERPL W P-5'-P-CCNC: 15 U/L (ref 0–45)
AT III ACT/NOR PPP CHRO: 75 % (ref 85–135)
BACTERIA SPEC CULT: NO GROWTH
BASE EXCESS BLDA CALC-SCNC: 6 MMOL/L
BASE EXCESS BLDA CALC-SCNC: 6.3 MMOL/L
BASE EXCESS BLDA CALC-SCNC: 6.6 MMOL/L
BASE EXCESS BLDA CALC-SCNC: 6.8 MMOL/L
BASE EXCESS BLDA CALC-SCNC: 6.8 MMOL/L
BASE EXCESS BLDA CALC-SCNC: 7 MMOL/L
BASE EXCESS BLDA CALC-SCNC: 7.1 MMOL/L
BASE EXCESS BLDA CALC-SCNC: 7.2 MMOL/L
BASE EXCESS BLDA CALC-SCNC: 8 MMOL/L
BASE EXCESS BLDA CALC-SCNC: 8.6 MMOL/L
BASE EXCESS BLDA CALC-SCNC: 9 MMOL/L
BASE EXCESS BLDA CALC-SCNC: 9.2 MMOL/L
BASE EXCESS BLDV CALC-SCNC: 6.7 MMOL/L
BASE EXCESS BLDV CALC-SCNC: 6.9 MMOL/L
BILIRUB SERPL-MCNC: 0.7 MG/DL (ref 0.2–1.3)
BILIRUB SERPL-MCNC: 0.7 MG/DL (ref 0.2–1.3)
BLD PROD TYP BPU: NORMAL
BLD UNIT ID BPU: 0
BLOOD PRODUCT CODE: NORMAL
BPU ID: NORMAL
BUN SERPL-MCNC: 32 MG/DL (ref 7–30)
BUN SERPL-MCNC: 33 MG/DL (ref 7–30)
BUN SERPL-MCNC: 34 MG/DL (ref 7–30)
BUN SERPL-MCNC: 35 MG/DL (ref 7–30)
CA-I BLD-MCNC: 4.7 MG/DL (ref 4.4–5.2)
CA-I BLD-MCNC: 4.9 MG/DL (ref 4.4–5.2)
CA-I BLD-MCNC: 4.9 MG/DL (ref 4.4–5.2)
CA-I BLD-MCNC: 5 MG/DL (ref 4.4–5.2)
CALCIUM SERPL-MCNC: 8.6 MG/DL (ref 8.5–10.1)
CALCIUM SERPL-MCNC: 8.9 MG/DL (ref 8.5–10.1)
CALCIUM SERPL-MCNC: 8.9 MG/DL (ref 8.5–10.1)
CALCIUM SERPL-MCNC: 9.3 MG/DL (ref 8.5–10.1)
CHLORIDE SERPL-SCNC: 103 MMOL/L (ref 94–109)
CHLORIDE SERPL-SCNC: 104 MMOL/L (ref 94–109)
CO2 SERPL-SCNC: 28 MMOL/L (ref 20–32)
CO2 SERPL-SCNC: 29 MMOL/L (ref 20–32)
CO2 SERPL-SCNC: 30 MMOL/L (ref 20–32)
CO2 SERPL-SCNC: 32 MMOL/L (ref 20–32)
CREAT SERPL-MCNC: 0.89 MG/DL (ref 0.52–1.04)
CREAT SERPL-MCNC: 0.89 MG/DL (ref 0.52–1.04)
CREAT SERPL-MCNC: 0.92 MG/DL (ref 0.52–1.04)
CREAT SERPL-MCNC: 0.94 MG/DL (ref 0.52–1.04)
CRP SERPL-MCNC: 200 MG/L (ref 0–8)
D DIMER PPP FEU-MCNC: 12.5 UG/ML FEU (ref 0–0.5)
D DIMER PPP FEU-MCNC: 15.2 UG/ML FEU (ref 0–0.5)
ERYTHROCYTE [DISTWIDTH] IN BLOOD BY AUTOMATED COUNT: 16.4 % (ref 10–15)
ERYTHROCYTE [DISTWIDTH] IN BLOOD BY AUTOMATED COUNT: 16.6 % (ref 10–15)
ERYTHROCYTE [DISTWIDTH] IN BLOOD BY AUTOMATED COUNT: 16.7 % (ref 10–15)
ERYTHROCYTE [DISTWIDTH] IN BLOOD BY AUTOMATED COUNT: 16.7 % (ref 10–15)
ERYTHROCYTE [SEDIMENTATION RATE] IN BLOOD BY WESTERGREN METHOD: 119 MM/H (ref 0–30)
FIBRINOGEN PPP-MCNC: 857 MG/DL (ref 200–420)
FIBRINOGEN PPP-MCNC: 976 MG/DL (ref 200–420)
GFR SERPL CREATININE-BSD FRML MDRD: 67 ML/MIN/{1.73_M2}
GFR SERPL CREATININE-BSD FRML MDRD: 68 ML/MIN/{1.73_M2}
GFR SERPL CREATININE-BSD FRML MDRD: 72 ML/MIN/{1.73_M2}
GFR SERPL CREATININE-BSD FRML MDRD: 72 ML/MIN/{1.73_M2}
GLUCOSE BLD-MCNC: 125 MG/DL (ref 70–99)
GLUCOSE BLD-MCNC: 126 MG/DL (ref 70–99)
GLUCOSE BLD-MCNC: 127 MG/DL (ref 70–99)
GLUCOSE BLD-MCNC: 132 MG/DL (ref 70–99)
GLUCOSE BLD-MCNC: 134 MG/DL (ref 70–99)
GLUCOSE BLD-MCNC: 140 MG/DL (ref 70–99)
GLUCOSE BLDC GLUCOMTR-MCNC: 134 MG/DL (ref 70–99)
GLUCOSE BLDC GLUCOMTR-MCNC: 143 MG/DL (ref 70–99)
GLUCOSE SERPL-MCNC: 119 MG/DL (ref 70–99)
GLUCOSE SERPL-MCNC: 132 MG/DL (ref 70–99)
GLUCOSE SERPL-MCNC: 138 MG/DL (ref 70–99)
GLUCOSE SERPL-MCNC: 144 MG/DL (ref 70–99)
GRAM STN SPEC: NORMAL
GRAM STN SPEC: NORMAL
HCO3 BLD-SCNC: 30 MMOL/L (ref 21–28)
HCO3 BLD-SCNC: 30 MMOL/L (ref 21–28)
HCO3 BLD-SCNC: 31 MMOL/L (ref 21–28)
HCO3 BLD-SCNC: 31 MMOL/L (ref 21–28)
HCO3 BLD-SCNC: 32 MMOL/L (ref 21–28)
HCO3 BLD-SCNC: 33 MMOL/L (ref 21–28)
HCO3 BLD-SCNC: 34 MMOL/L (ref 21–28)
HCO3 BLDA-SCNC: 29 MMOL/L (ref 21–28)
HCO3 BLDV-SCNC: 31 MMOL/L (ref 21–28)
HCO3 BLDV-SCNC: 32 MMOL/L (ref 21–28)
HCT VFR BLD AUTO: 27.2 % (ref 35–47)
HCT VFR BLD AUTO: 27.2 % (ref 35–47)
HCT VFR BLD AUTO: 27.7 % (ref 35–47)
HCT VFR BLD AUTO: 27.9 % (ref 35–47)
HGB BLD-MCNC: 8.4 G/DL (ref 11.7–15.7)
HGB BLD-MCNC: 8.4 G/DL (ref 11.7–15.7)
HGB BLD-MCNC: 8.7 G/DL (ref 11.7–15.7)
HGB BLD-MCNC: 8.7 G/DL (ref 11.7–15.7)
HGB FREE PLAS-MCNC: <30 MG/DL
INR PPP: 1.26 (ref 0.86–1.14)
INR PPP: 1.56 (ref 0.86–1.14)
INR PPP: 2.39 (ref 0.86–1.14)
INR PPP: 2.4 (ref 0.86–1.14)
INTERPRETATION ECG - MUSE: NORMAL
KCT BLD-ACNC: 146 SEC (ref 75–150)
KCT BLD-ACNC: 158 SEC (ref 75–150)
KCT BLD-ACNC: 162 SEC (ref 75–150)
KCT BLD-ACNC: 162 SEC (ref 75–150)
KCT BLD-ACNC: 163 SEC (ref 75–150)
KCT BLD-ACNC: 166 SEC (ref 75–150)
KCT BLD-ACNC: 171 SEC (ref 75–150)
KCT BLD-ACNC: 175 SEC (ref 75–150)
KCT BLD-ACNC: 179 SEC (ref 75–150)
KCT BLD-ACNC: 183 SEC (ref 75–150)
KCT BLD-ACNC: 183 SEC (ref 75–150)
KCT BLD-ACNC: 187 SEC (ref 75–150)
KCT BLD-ACNC: 195 SEC (ref 75–150)
LACTATE BLD-SCNC: 0.5 MMOL/L (ref 0.7–2)
LACTATE BLD-SCNC: 0.5 MMOL/L (ref 0.7–2)
LACTATE BLD-SCNC: 0.6 MMOL/L (ref 0.7–2)
LACTATE BLD-SCNC: 0.6 MMOL/L (ref 0.7–2)
LDH SERPL L TO P-CCNC: 409 U/L (ref 81–234)
LMWH PPP CHRO-ACNC: 0.62 IU/ML
Lab: NORMAL
Lab: NORMAL
MAGNESIUM SERPL-MCNC: 2.2 MG/DL (ref 1.6–2.3)
MAGNESIUM SERPL-MCNC: 2.4 MG/DL (ref 1.6–2.3)
MAGNESIUM SERPL-MCNC: 2.4 MG/DL (ref 1.6–2.3)
MAGNESIUM SERPL-MCNC: 2.5 MG/DL (ref 1.6–2.3)
MCH RBC QN AUTO: 28.7 PG (ref 26.5–33)
MCH RBC QN AUTO: 28.8 PG (ref 26.5–33)
MCH RBC QN AUTO: 28.9 PG (ref 26.5–33)
MCH RBC QN AUTO: 29 PG (ref 26.5–33)
MCHC RBC AUTO-ENTMCNC: 30.9 G/DL (ref 31.5–36.5)
MCHC RBC AUTO-ENTMCNC: 30.9 G/DL (ref 31.5–36.5)
MCHC RBC AUTO-ENTMCNC: 31.2 G/DL (ref 31.5–36.5)
MCHC RBC AUTO-ENTMCNC: 31.4 G/DL (ref 31.5–36.5)
MCV RBC AUTO: 92 FL (ref 78–100)
MCV RBC AUTO: 93 FL (ref 78–100)
MCV RBC AUTO: 93 FL (ref 78–100)
MCV RBC AUTO: 94 FL (ref 78–100)
O2/TOTAL GAS SETTING VFR VENT: 50 %
O2/TOTAL GAS SETTING VFR VENT: 60 %
OXYHGB MFR BLD: 92 % (ref 92–100)
OXYHGB MFR BLD: 93 % (ref 92–100)
OXYHGB MFR BLD: 94 % (ref 92–100)
OXYHGB MFR BLD: 95 % (ref 92–100)
OXYHGB MFR BLD: 96 % (ref 92–100)
OXYHGB MFR BLD: 97 % (ref 92–100)
OXYHGB MFR BLD: 97 % (ref 92–100)
OXYHGB MFR BLDA: 99 % (ref 75–100)
OXYHGB MFR BLDV: 57 %
OXYHGB MFR BLDV: 57 %
PCO2 BLD: 36 MM HG (ref 35–45)
PCO2 BLD: 40 MM HG (ref 35–45)
PCO2 BLD: 41 MM HG (ref 35–45)
PCO2 BLD: 45 MM HG (ref 35–45)
PCO2 BLD: 45 MM HG (ref 35–45)
PCO2 BLD: 46 MM HG (ref 35–45)
PCO2 BLD: 47 MM HG (ref 35–45)
PCO2 BLD: 48 MM HG (ref 35–45)
PCO2 BLDA: 32 MM HG (ref 35–45)
PCO2 BLDV: 44 MM HG (ref 40–50)
PCO2 BLDV: 48 MM HG (ref 40–50)
PH BLD: 7.44 PH (ref 7.35–7.45)
PH BLD: 7.45 PH (ref 7.35–7.45)
PH BLD: 7.46 PH (ref 7.35–7.45)
PH BLD: 7.47 PH (ref 7.35–7.45)
PH BLD: 7.47 PH (ref 7.35–7.45)
PH BLD: 7.48 PH (ref 7.35–7.45)
PH BLD: 7.49 PH (ref 7.35–7.45)
PH BLD: 7.54 PH (ref 7.35–7.45)
PH BLDA: 7.56 PH (ref 7.35–7.45)
PH BLDV: 7.43 PH (ref 7.32–7.43)
PH BLDV: 7.46 PH (ref 7.32–7.43)
PHOSPHATE SERPL-MCNC: 3.2 MG/DL (ref 2.5–4.5)
PHOSPHATE SERPL-MCNC: 3.2 MG/DL (ref 2.5–4.5)
PHOSPHATE SERPL-MCNC: 4 MG/DL (ref 2.5–4.5)
PLATELET # BLD AUTO: 106 10E9/L (ref 150–450)
PLATELET # BLD AUTO: 111 10E9/L (ref 150–450)
PLATELET # BLD AUTO: 114 10E9/L (ref 150–450)
PLATELET # BLD AUTO: 96 10E9/L (ref 150–450)
PO2 BLD: 100 MM HG (ref 80–105)
PO2 BLD: 107 MM HG (ref 80–105)
PO2 BLD: 66 MM HG (ref 80–105)
PO2 BLD: 72 MM HG (ref 80–105)
PO2 BLD: 75 MM HG (ref 80–105)
PO2 BLD: 80 MM HG (ref 80–105)
PO2 BLD: 80 MM HG (ref 80–105)
PO2 BLD: 88 MM HG (ref 80–105)
PO2 BLD: 89 MM HG (ref 80–105)
PO2 BLD: 92 MM HG (ref 80–105)
PO2 BLD: 93 MM HG (ref 80–105)
PO2 BLD: 95 MM HG (ref 80–105)
PO2 BLD: 97 MM HG (ref 80–105)
PO2 BLDA: 403 MM HG (ref 80–105)
PO2 BLDV: 30 MM HG (ref 25–47)
PO2 BLDV: 31 MM HG (ref 25–47)
POTASSIUM BLD-SCNC: 3.5 MMOL/L (ref 3.4–5.3)
POTASSIUM BLD-SCNC: 3.8 MMOL/L (ref 3.4–5.3)
POTASSIUM BLD-SCNC: 3.9 MMOL/L (ref 3.4–5.3)
POTASSIUM BLD-SCNC: 4 MMOL/L (ref 3.4–5.3)
POTASSIUM BLD-SCNC: 4.2 MMOL/L (ref 3.4–5.3)
POTASSIUM BLD-SCNC: 4.3 MMOL/L (ref 3.4–5.3)
POTASSIUM SERPL-SCNC: 3.5 MMOL/L (ref 3.4–5.3)
POTASSIUM SERPL-SCNC: 3.8 MMOL/L (ref 3.4–5.3)
POTASSIUM SERPL-SCNC: 4.1 MMOL/L (ref 3.4–5.3)
POTASSIUM SERPL-SCNC: 4.2 MMOL/L (ref 3.4–5.3)
PROT SERPL-MCNC: 6.2 G/DL (ref 6.8–8.8)
PROT SERPL-MCNC: 6.2 G/DL (ref 6.8–8.8)
RBC # BLD AUTO: 2.91 10E12/L (ref 3.8–5.2)
RBC # BLD AUTO: 2.93 10E12/L (ref 3.8–5.2)
RBC # BLD AUTO: 3 10E12/L (ref 3.8–5.2)
RBC # BLD AUTO: 3.02 10E12/L (ref 3.8–5.2)
SODIUM SERPL-SCNC: 139 MMOL/L (ref 133–144)
SODIUM SERPL-SCNC: 139 MMOL/L (ref 133–144)
SODIUM SERPL-SCNC: 140 MMOL/L (ref 133–144)
SODIUM SERPL-SCNC: 140 MMOL/L (ref 133–144)
SPECIMEN SOURCE: NORMAL
SPECIMEN SOURCE: NORMAL
TRANSFUSION STATUS PATIENT QL: NORMAL
TRANSFUSION STATUS PATIENT QL: NORMAL
TROPONIN I SERPL-MCNC: <0.015 UG/L (ref 0–0.04)
WBC # BLD AUTO: 11.3 10E9/L (ref 4–11)
WBC # BLD AUTO: 11.5 10E9/L (ref 4–11)
WBC # BLD AUTO: 12.3 10E9/L (ref 4–11)
WBC # BLD AUTO: 13.4 10E9/L (ref 4–11)

## 2019-09-18 PROCEDURE — 85730 THROMBOPLASTIN TIME PARTIAL: CPT | Performed by: SURGERY

## 2019-09-18 PROCEDURE — 33948 ECMO/ECLS DAILY MGMT-VENOUS: CPT

## 2019-09-18 PROCEDURE — 82947 ASSAY GLUCOSE BLOOD QUANT: CPT | Performed by: STUDENT IN AN ORGANIZED HEALTH CARE EDUCATION/TRAINING PROGRAM

## 2019-09-18 PROCEDURE — 86901 BLOOD TYPING SEROLOGIC RH(D): CPT | Performed by: STUDENT IN AN ORGANIZED HEALTH CARE EDUCATION/TRAINING PROGRAM

## 2019-09-18 PROCEDURE — 25000128 H RX IP 250 OP 636: Performed by: SURGERY

## 2019-09-18 PROCEDURE — 25000132 ZZH RX MED GY IP 250 OP 250 PS 637: Performed by: SURGERY

## 2019-09-18 PROCEDURE — 82805 BLOOD GASES W/O2 SATURATION: CPT | Performed by: STUDENT IN AN ORGANIZED HEALTH CARE EDUCATION/TRAINING PROGRAM

## 2019-09-18 PROCEDURE — 94640 AIRWAY INHALATION TREATMENT: CPT

## 2019-09-18 PROCEDURE — 86140 C-REACTIVE PROTEIN: CPT | Performed by: SURGERY

## 2019-09-18 PROCEDURE — 85610 PROTHROMBIN TIME: CPT | Performed by: STUDENT IN AN ORGANIZED HEALTH CARE EDUCATION/TRAINING PROGRAM

## 2019-09-18 PROCEDURE — 25800030 ZZH RX IP 258 OP 636: Performed by: STUDENT IN AN ORGANIZED HEALTH CARE EDUCATION/TRAINING PROGRAM

## 2019-09-18 PROCEDURE — 94640 AIRWAY INHALATION TREATMENT: CPT | Mod: 76

## 2019-09-18 PROCEDURE — 83735 ASSAY OF MAGNESIUM: CPT | Performed by: SURGERY

## 2019-09-18 PROCEDURE — 25000128 H RX IP 250 OP 636: Performed by: STUDENT IN AN ORGANIZED HEALTH CARE EDUCATION/TRAINING PROGRAM

## 2019-09-18 PROCEDURE — 85379 FIBRIN DEGRADATION QUANT: CPT | Performed by: SURGERY

## 2019-09-18 PROCEDURE — 27210437 ZZH NUTRITION PRODUCT SEMIELEM INTERMED LITER

## 2019-09-18 PROCEDURE — 25000131 ZZH RX MED GY IP 250 OP 636 PS 637: Performed by: STUDENT IN AN ORGANIZED HEALTH CARE EDUCATION/TRAINING PROGRAM

## 2019-09-18 PROCEDURE — 99291 CRITICAL CARE FIRST HOUR: CPT | Mod: GC | Performed by: INTERNAL MEDICINE

## 2019-09-18 PROCEDURE — 85730 THROMBOPLASTIN TIME PARTIAL: CPT | Performed by: STUDENT IN AN ORGANIZED HEALTH CARE EDUCATION/TRAINING PROGRAM

## 2019-09-18 PROCEDURE — 86923 COMPATIBILITY TEST ELECTRIC: CPT | Performed by: STUDENT IN AN ORGANIZED HEALTH CARE EDUCATION/TRAINING PROGRAM

## 2019-09-18 PROCEDURE — 84100 ASSAY OF PHOSPHORUS: CPT | Performed by: STUDENT IN AN ORGANIZED HEALTH CARE EDUCATION/TRAINING PROGRAM

## 2019-09-18 PROCEDURE — 83051 HEMOGLOBIN PLASMA: CPT | Performed by: SURGERY

## 2019-09-18 PROCEDURE — 85610 PROTHROMBIN TIME: CPT | Performed by: SURGERY

## 2019-09-18 PROCEDURE — C9113 INJ PANTOPRAZOLE SODIUM, VIA: HCPCS | Performed by: SURGERY

## 2019-09-18 PROCEDURE — 94003 VENT MGMT INPAT SUBQ DAY: CPT

## 2019-09-18 PROCEDURE — 33949 ECMO/ECLS DAILY MGMT ARTERY: CPT

## 2019-09-18 PROCEDURE — 83605 ASSAY OF LACTIC ACID: CPT | Performed by: STUDENT IN AN ORGANIZED HEALTH CARE EDUCATION/TRAINING PROGRAM

## 2019-09-18 PROCEDURE — 00000146 ZZHCL STATISTIC GLUCOSE BY METER IP

## 2019-09-18 PROCEDURE — 80053 COMPREHEN METABOLIC PANEL: CPT | Performed by: SURGERY

## 2019-09-18 PROCEDURE — 25000132 ZZH RX MED GY IP 250 OP 250 PS 637: Performed by: STUDENT IN AN ORGANIZED HEALTH CARE EDUCATION/TRAINING PROGRAM

## 2019-09-18 PROCEDURE — G0463 HOSPITAL OUTPT CLINIC VISIT: HCPCS

## 2019-09-18 PROCEDURE — 86900 BLOOD TYPING SEROLOGIC ABO: CPT | Performed by: STUDENT IN AN ORGANIZED HEALTH CARE EDUCATION/TRAINING PROGRAM

## 2019-09-18 PROCEDURE — 83615 LACTATE (LD) (LDH) ENZYME: CPT | Performed by: SURGERY

## 2019-09-18 PROCEDURE — 84484 ASSAY OF TROPONIN QUANT: CPT | Performed by: STUDENT IN AN ORGANIZED HEALTH CARE EDUCATION/TRAINING PROGRAM

## 2019-09-18 PROCEDURE — 85384 FIBRINOGEN ACTIVITY: CPT | Performed by: SURGERY

## 2019-09-18 PROCEDURE — 85652 RBC SED RATE AUTOMATED: CPT | Performed by: SURGERY

## 2019-09-18 PROCEDURE — 85027 COMPLETE CBC AUTOMATED: CPT | Performed by: SURGERY

## 2019-09-18 PROCEDURE — 84100 ASSAY OF PHOSPHORUS: CPT | Performed by: SURGERY

## 2019-09-18 PROCEDURE — 85520 HEPARIN ASSAY: CPT | Performed by: SURGERY

## 2019-09-18 PROCEDURE — 86850 RBC ANTIBODY SCREEN: CPT | Performed by: STUDENT IN AN ORGANIZED HEALTH CARE EDUCATION/TRAINING PROGRAM

## 2019-09-18 PROCEDURE — 82805 BLOOD GASES W/O2 SATURATION: CPT | Performed by: SURGERY

## 2019-09-18 PROCEDURE — 84132 ASSAY OF SERUM POTASSIUM: CPT | Performed by: STUDENT IN AN ORGANIZED HEALTH CARE EDUCATION/TRAINING PROGRAM

## 2019-09-18 PROCEDURE — P9016 RBC LEUKOCYTES REDUCED: HCPCS | Performed by: STUDENT IN AN ORGANIZED HEALTH CARE EDUCATION/TRAINING PROGRAM

## 2019-09-18 PROCEDURE — 20000004 ZZH R&B ICU UMMC

## 2019-09-18 PROCEDURE — 25000125 ZZHC RX 250: Performed by: SURGERY

## 2019-09-18 PROCEDURE — 85300 ANTITHROMBIN III ACTIVITY: CPT | Performed by: SURGERY

## 2019-09-18 PROCEDURE — 83735 ASSAY OF MAGNESIUM: CPT | Performed by: STUDENT IN AN ORGANIZED HEALTH CARE EDUCATION/TRAINING PROGRAM

## 2019-09-18 PROCEDURE — 25800030 ZZH RX IP 258 OP 636: Performed by: SURGERY

## 2019-09-18 PROCEDURE — 40000275 ZZH STATISTIC RCP TIME EA 10 MIN

## 2019-09-18 PROCEDURE — 82330 ASSAY OF CALCIUM: CPT | Performed by: STUDENT IN AN ORGANIZED HEALTH CARE EDUCATION/TRAINING PROGRAM

## 2019-09-18 PROCEDURE — 80048 BASIC METABOLIC PNL TOTAL CA: CPT | Performed by: STUDENT IN AN ORGANIZED HEALTH CARE EDUCATION/TRAINING PROGRAM

## 2019-09-18 PROCEDURE — 25000125 ZZHC RX 250: Performed by: STUDENT IN AN ORGANIZED HEALTH CARE EDUCATION/TRAINING PROGRAM

## 2019-09-18 PROCEDURE — 40000014 ZZH STATISTIC ARTERIAL MONITORING DAILY

## 2019-09-18 PROCEDURE — 85027 COMPLETE CBC AUTOMATED: CPT | Performed by: STUDENT IN AN ORGANIZED HEALTH CARE EDUCATION/TRAINING PROGRAM

## 2019-09-18 PROCEDURE — 84484 ASSAY OF TROPONIN QUANT: CPT | Performed by: SURGERY

## 2019-09-18 PROCEDURE — 85347 COAGULATION TIME ACTIVATED: CPT

## 2019-09-18 PROCEDURE — 71045 X-RAY EXAM CHEST 1 VIEW: CPT

## 2019-09-18 RX ORDER — ACETYLCYSTEINE 200 MG/ML
2 SOLUTION ORAL; RESPIRATORY (INHALATION) EVERY 4 HOURS
Status: DISCONTINUED | OUTPATIENT
Start: 2019-09-18 | End: 2019-09-20

## 2019-09-18 RX ORDER — BISACODYL 10 MG
10 SUPPOSITORY, RECTAL RECTAL DAILY PRN
Status: DISCONTINUED | OUTPATIENT
Start: 2019-09-18 | End: 2019-10-07 | Stop reason: HOSPADM

## 2019-09-18 RX ADMIN — BIVALIRUDIN 0.15 MG/KG/HR: 250 INJECTION, POWDER, LYOPHILIZED, FOR SOLUTION INTRAVENOUS at 17:58

## 2019-09-18 RX ADMIN — FUROSEMIDE 80 MG: 10 INJECTION, SOLUTION INTRAVENOUS at 22:01

## 2019-09-18 RX ADMIN — DEXMEDETOMIDINE 1 MCG/KG/HR: 100 INJECTION, SOLUTION, CONCENTRATE INTRAVENOUS at 04:22

## 2019-09-18 RX ADMIN — DEXMEDETOMIDINE 1 MCG/KG/HR: 100 INJECTION, SOLUTION, CONCENTRATE INTRAVENOUS at 02:15

## 2019-09-18 RX ADMIN — DEXMEDETOMIDINE 1 MCG/KG/HR: 100 INJECTION, SOLUTION, CONCENTRATE INTRAVENOUS at 11:56

## 2019-09-18 RX ADMIN — PIPERACILLIN SODIUM AND TAZOBACTAM SODIUM 3.38 G: 3; .375 INJECTION, POWDER, LYOPHILIZED, FOR SOLUTION INTRAVENOUS at 23:34

## 2019-09-18 RX ADMIN — DEXMEDETOMIDINE 1 MCG/KG/HR: 100 INJECTION, SOLUTION, CONCENTRATE INTRAVENOUS at 19:48

## 2019-09-18 RX ADMIN — PANTOPRAZOLE SODIUM 40 MG: 40 INJECTION, POWDER, FOR SOLUTION INTRAVENOUS at 19:49

## 2019-09-18 RX ADMIN — Medication: at 07:36

## 2019-09-18 RX ADMIN — MICONAZOLE NITRATE: 20 POWDER TOPICAL at 07:36

## 2019-09-18 RX ADMIN — Medication 100 MCG/HR: at 19:34

## 2019-09-18 RX ADMIN — PANTOPRAZOLE SODIUM 40 MG: 40 INJECTION, POWDER, FOR SOLUTION INTRAVENOUS at 07:29

## 2019-09-18 RX ADMIN — FUROSEMIDE 80 MG: 10 INJECTION, SOLUTION INTRAVENOUS at 09:45

## 2019-09-18 RX ADMIN — BIVALIRUDIN 0.15 MG/KG/HR: 250 INJECTION, POWDER, LYOPHILIZED, FOR SOLUTION INTRAVENOUS at 09:29

## 2019-09-18 RX ADMIN — MIDAZOLAM 6 MG/HR: 5 INJECTION INTRAMUSCULAR; INTRAVENOUS at 09:26

## 2019-09-18 RX ADMIN — DOBUTAMINE 2.5 MCG/KG/MIN: 12.5 INJECTION, SOLUTION INTRAVENOUS at 19:57

## 2019-09-18 RX ADMIN — POTASSIUM CHLORIDE 20 MEQ: 29.8 INJECTION, SOLUTION INTRAVENOUS at 12:25

## 2019-09-18 RX ADMIN — PIPERACILLIN SODIUM AND TAZOBACTAM SODIUM 3.38 G: 3; .375 INJECTION, POWDER, LYOPHILIZED, FOR SOLUTION INTRAVENOUS at 05:31

## 2019-09-18 RX ADMIN — POTASSIUM CHLORIDE 20 MEQ: 1.5 POWDER, FOR SOLUTION ORAL at 10:22

## 2019-09-18 RX ADMIN — PIPERACILLIN SODIUM AND TAZOBACTAM SODIUM 3.38 G: 3; .375 INJECTION, POWDER, LYOPHILIZED, FOR SOLUTION INTRAVENOUS at 19:01

## 2019-09-18 RX ADMIN — MIDAZOLAM HYDROCHLORIDE 2 MG: 2 INJECTION, SOLUTION INTRAMUSCULAR; INTRAVENOUS at 08:30

## 2019-09-18 RX ADMIN — IPRATROPIUM BROMIDE AND ALBUTEROL SULFATE 3 ML: .5; 3 SOLUTION RESPIRATORY (INHALATION) at 20:13

## 2019-09-18 RX ADMIN — POTASSIUM CHLORIDE 20 MEQ: 29.8 INJECTION, SOLUTION INTRAVENOUS at 18:10

## 2019-09-18 RX ADMIN — HEPARIN SODIUM (PORCINE) LOCK FLUSH IV SOLN 100 UNIT/ML 3000 UNITS: 100 SOLUTION at 00:59

## 2019-09-18 RX ADMIN — HEPARIN SODIUM 3900 UNITS/HR: 10000 INJECTION, SOLUTION INTRAVENOUS at 00:35

## 2019-09-18 RX ADMIN — ACETYLCYSTEINE 2 ML: 200 SOLUTION ORAL; RESPIRATORY (INHALATION) at 16:03

## 2019-09-18 RX ADMIN — INSULIN ASPART 1 UNITS: 100 INJECTION, SOLUTION INTRAVENOUS; SUBCUTANEOUS at 08:08

## 2019-09-18 RX ADMIN — THIAMINE HCL TAB 100 MG 100 MG: 100 TAB at 07:32

## 2019-09-18 RX ADMIN — IPRATROPIUM BROMIDE AND ALBUTEROL SULFATE 3 ML: .5; 3 SOLUTION RESPIRATORY (INHALATION) at 11:41

## 2019-09-18 RX ADMIN — MULTIPLE VITAMINS W/ MINERALS TAB 1 TABLET: TAB at 07:32

## 2019-09-18 RX ADMIN — POTASSIUM CHLORIDE 20 MEQ: 29.8 INJECTION, SOLUTION INTRAVENOUS at 02:02

## 2019-09-18 RX ADMIN — ACETYLCYSTEINE 2 ML: 200 SOLUTION ORAL; RESPIRATORY (INHALATION) at 11:41

## 2019-09-18 RX ADMIN — DEXMEDETOMIDINE 1 MCG/KG/HR: 100 INJECTION, SOLUTION, CONCENTRATE INTRAVENOUS at 14:51

## 2019-09-18 RX ADMIN — ACETYLCYSTEINE 2 ML: 200 SOLUTION ORAL; RESPIRATORY (INHALATION) at 20:14

## 2019-09-18 RX ADMIN — IPRATROPIUM BROMIDE AND ALBUTEROL SULFATE 3 ML: .5; 3 SOLUTION RESPIRATORY (INHALATION) at 08:45

## 2019-09-18 RX ADMIN — DEXMEDETOMIDINE 1 MCG/KG/HR: 100 INJECTION, SOLUTION, CONCENTRATE INTRAVENOUS at 21:45

## 2019-09-18 RX ADMIN — FENTANYL CITRATE 50 MCG: 50 INJECTION, SOLUTION INTRAMUSCULAR; INTRAVENOUS at 08:30

## 2019-09-18 RX ADMIN — FUROSEMIDE 80 MG: 10 INJECTION, SOLUTION INTRAVENOUS at 03:42

## 2019-09-18 RX ADMIN — DEXMEDETOMIDINE 1 MCG/KG/HR: 100 INJECTION, SOLUTION, CONCENTRATE INTRAVENOUS at 06:54

## 2019-09-18 RX ADMIN — MICONAZOLE NITRATE: 20 POWDER TOPICAL at 19:27

## 2019-09-18 RX ADMIN — DEXMEDETOMIDINE 1 MCG/KG/HR: 100 INJECTION, SOLUTION, CONCENTRATE INTRAVENOUS at 09:44

## 2019-09-18 RX ADMIN — POTASSIUM CHLORIDE 20 MEQ: 29.8 INJECTION, SOLUTION INTRAVENOUS at 06:10

## 2019-09-18 RX ADMIN — HEPARIN SODIUM 4000 UNITS/HR: 10000 INJECTION, SOLUTION INTRAVENOUS at 05:19

## 2019-09-18 RX ADMIN — POTASSIUM CHLORIDE 20 MEQ: 29.8 INJECTION, SOLUTION INTRAVENOUS at 10:22

## 2019-09-18 RX ADMIN — POLYETHYLENE GLYCOL 3350 17 G: 17 POWDER, FOR SOLUTION ORAL at 07:32

## 2019-09-18 RX ADMIN — MICONAZOLE NITRATE: 20 POWDER TOPICAL at 00:35

## 2019-09-18 RX ADMIN — FUROSEMIDE 80 MG: 10 INJECTION, SOLUTION INTRAVENOUS at 15:51

## 2019-09-18 RX ADMIN — IPRATROPIUM BROMIDE AND ALBUTEROL SULFATE 3 ML: .5; 3 SOLUTION RESPIRATORY (INHALATION) at 16:03

## 2019-09-18 RX ADMIN — BISACODYL 10 MG: 10 SUPPOSITORY RECTAL at 08:24

## 2019-09-18 RX ADMIN — HEPARIN SODIUM (PORCINE) LOCK FLUSH IV SOLN 100 UNIT/ML 3000 UNITS: 100 SOLUTION at 06:10

## 2019-09-18 RX ADMIN — PIPERACILLIN SODIUM AND TAZOBACTAM SODIUM 3.38 G: 3; .375 INJECTION, POWDER, LYOPHILIZED, FOR SOLUTION INTRAVENOUS at 11:45

## 2019-09-18 ASSESSMENT — ACTIVITIES OF DAILY LIVING (ADL)
ADLS_ACUITY_SCORE: 21
ADLS_ACUITY_SCORE: 19
ADLS_ACUITY_SCORE: 21

## 2019-09-18 ASSESSMENT — MIFFLIN-ST. JEOR: SCORE: 2396.5

## 2019-09-18 NOTE — PLAN OF CARE
D/I:      Neuro: CHU slightly but not to request. Pupils 3+ and brisk. Versed Gtt @ 6 mg/hr. Precedex Gtt @ 1 mcg/kg/min. Fentanyl Gtt @ 100 mcg/hr.       Cardiac: SR 70 's to 80's with no ectopy.  Dobutamine Gtt @ 2.5 mcg/kg/min. Levo Gtt @ 0.05 mcg/kg/min to keep MAP > 65. ECMO continues. New fibrin noted by ECMO tech on ECMO tubing. Heparin Gtt slowly increased to to 4,000 units/hr this shift. Goal ACT is 180 -200.     Resp:  Vent CMV FIO2 50%. TV= 450. RR= 12. PEEP= 8.  Moderate amount of red and thick tan;red-streaked secretions suctioned out of ETT tube this shift. CXR shows pulmonary edema.       : Last BM prior to admission. Bowel sounds hypoactive. Scheduled Senokot and Miralax given.  Tube feeding continues   @ goal rate of 65 ml/hr. Free water of 75 ml Q 4 hr with today's Na+ level =  139.     GI: Scheduled Lasix 80 mg IV Q 6 hr given. Urine output in french variable but averaging about 400 ml/hr. Potassium levels replaced twice this shift per ordered lytes replacement protocol.      Skin: No new issues. Excoriated areas in deep skin folds under breasts, pannus and groin folds. Miconazole powder and Interdry silver fabric applied.  Intact serous blister in mid lower back.     A/P:     Vent PIP up to the mid 40's frequently this shift. Pt might benefit from a bronch today.  Pt on a very high rate heparin gtt rate and ACT still not at goal of 180 to 200. Monitor increasing fibrin on ECMO tubing closely. Continue diuresis, monitor potassium levels closely and replace per protocol.

## 2019-09-18 NOTE — PLAN OF CARE
D/I:   Neuro: Able to CHU, but not to command. Purposeful arm movement towards head/ ETT when coughing/ suctioned. No attempts to pull tubes. PERRL. Opens eyes to voice, but does not track. Remains sedated/ pain control on Precedex/ Versed and Fentanyl gtts.     Cardiac: SR 70s-80s. On low dose Levophed and Dobutamine maintaining MAP >65. VV ECMO flows @ 4.L.   Changed from Heparin to Bivalrudin protocol for anticoagulation r/t continued high doses of Heparin required and poor control of ACTs in desired range. Fibrin noted in circuit near insertion site of RIJ cannula .    Resp: VV ECMO sweep off @ 11:15. Oxygenating well w/ ABG stable WNL w/ vent PC FiO2 60%; ; RR 12; PEEP 8.   Freq strong productive cough. Small/ mod amt thick red secretions sxned in am; secretions thin, clear/white in pm.     : Diuresing well w/ Lasix 80mg q6h.   GI: No stool since admission 9 days ago. Supp this am in addition to bowel regimen  Large amt soft, thick liquid stool in pm .    A:  Improved resp function.   P: continue to monitor closely, altagracia changes in resp status.

## 2019-09-18 NOTE — PROGRESS NOTES
[facility], [facility] Nurse Inpatient Adult Pressure Injury Prevention Assessment: ECMO  reassessment    Positioning Tolerance:good  Date of ECMO cannulation: 9/9/19  L femoral venous and R internal jugular venous   Presence of Ischemia: No    Pressure Injury Prevention Interventions In Place:  Z flow Positioner under head, Pillows for repositioning, TAPs Wedge Positioners in use, Heel off-loading boots and Mepilex Sacral Dressing   Current support surface: Bariatric Low air loss mattress       Pressure Injury Prevention Interventions Added:  none        Plan of Care for Positioning and Pressure Injury Prevention  Reposition patient every 1-2 hours using TAP Wedges  Position head on Z flow positioner, mold indentation at areas of pressure points.  Pad ECMO groin and chest cannula under rigid connectors with Optifoam or Soft cloth  Heel off-loading Boots at all times  Sacral Mepilex for Prevention, change every 5 days and prn  Low Air loss mattress    Patient History:   According to medical record: 59 year old female with a history of hypertension, hyperlipidemia, sleep apnea, morbid obesity, and chronic bronchitis who presents to the emergency department via EMS for evaluation of respiratory distress s/p intubation and PEA arrest with CTA consistent with bilateral PE transferred here for [facility] ECMO thrombectomy and catheter directed lytics on 9/9.  S/p 9/17 VA ecmo decannulation and repair of left femoral artery, venous-venous cannulation    Current Diet / Nutrition:     Orders Placed This Encounter      NPO for Medical/Clinical Reasons Except for: NPO but receiving Tube Feeding      Output:    I/O last 3 completed shifts:  In: 7275.6 [I.V.:4002.6; NG/GT:635]  Out: 7255 [Urine:6515; Emesis/NG output:140; Blood:600]  Containment: of urine/stool: Urinary Catheter    Risk Assessment:   Sensory Perception: 1-->completely limited  Moisture: 3-->occasionally moist  Activity:  1-->bedfast  Mobility: 1-->completely immobile  Nutrition: 3-->adequate  Friction and Shear: 1-->problem  Ole Score: 10    Labs:    Recent Labs   Lab 09/18/19  0954 09/18/19  0346   ALBUMIN  --  2.2*   HGB 8.4* 8.7*   INR 1.56* 1.26*   WBC 12.3* 13.4*   CRP  --  200.0*       Focused Assessment:  Left groin and R internal jugular  ECMO cannulation sites    Pressure Injury Present::No    Discussed plan of care with Nurse  WOC Nurse follow-up plan:weekly

## 2019-09-18 NOTE — PROGRESS NOTES
Cardiology Progress Note    Assessment & Plan      59 year old female with a history of hypertension, hyperlipidemia, sleep apnea, morbid obesity, and chronic bronchitis who presents to the emergency department via EMS for evaluation of respiratory distress s/p intubation and PEA arrest with CTA consistent with bilateral PE transferred here for [facility] ECMO thrombectomy and catheter directed lytics on 9/9.    Plan for today  - switch heparin ggt to bival given high heparin requirements, clotting in the venous cannula and fibrin in the circuit despite heparin  - ETT in advanced will pull back 3 cm   - pip/tazo follow cultures  - aggressive pulmonary toileting, mucomyst scheduled duonebs   - wean NE continue dobutamine    Neurology: Intubated, sedated  --continue versed, fentanyl, precedex - hypotensive with propofol   - initial CTH no acute changes, moving when sedation is weaned down and following commands    Cardiovascular / Hemodynamics: PEA arrest secondary to massive PE.   CTA-PE study demonstrates bilateral distal main and proximal lobar pulmonary emboli, greater on the right with elevated RV/LV ratio. Peripheral V-A ECMO inserted for hemodynamic support for massive PE. Went to cath lab and then to IR for suction mechanical thrombectomy.  LA 8.6   TTE: dilated RV, severely reduced. Turn down 9/16 LVEF 45-55%, RV function mildly reduced and trivial pericardial effusion stable gases.   EKG: sinus tachy  --ACT goal 180-200  - on dobutamine at 2.5   Pulmonary: PEA arrest secondary to massive PE.   History of heavy smoking   Ventilation Mode: CMV/AC  (Continuous Mandatory Ventilation/ Assist Control)  FiO2 (%): 50 %  Rate Set (breaths/minute): 12 breaths/min  Tidal Volume Set (mL): (S) 400 mL (per MD)  PEEP (cm H2O): 8 cmH2O  Oxygen Concentration (%): 50 %  Resp: 13  -s/p thrombectomy and catheter directed lytics 9/9-9/10  Chest CT Subpleural groundglass attenuation in the right lung, possibly  pulmonary infarcts given  previously seen extensive pulmonary emboli.   Patchy nodularity in the right base suggesting aspiration.  - Now weaning vent requirements. CXR showing white out of left lung- bronched without mucous plugs, pulmonary edema. CT chest showed L pleural effusion increased R pleural effusion and some concern for tracheomalacia?  CXR: Lines in stable position.   --- scheduled duonebs    GI and Nutrition: No known medical hx.    --monitor BID LFTs  - nutrition consult   --GI Prophylaxis: PPI    Renal, Fluid and Electrolytes: Cr at baseline  UOP continue to monitor.  - volume overloaded will increase diuresis with 80mg q6h  --maintain K>3 and Mg>2    Infectious Disease: Aspiration pneumonia -- sputum culture MSSA  --vancomycin/zosyn x5 days for ECMO, vanc last dose 9/15, will continued on cefazolin, on 9/17 went for [facility] ECMO decannulation which was complicated by hypercarbic respiratory failure and was therefore transitioned to VV ECMO   - CXR with pulmonary edema got 1.4L of crystalloid in the OR and two units of product. Restarted diureses and broadened back to pip/tazo with repeat cultures.    Hematology and Oncology: Receiving bival for ECMO and  PE.   --cryo PRN fibrinogen < 200; FFP for INR >2  --Transfuse for Hgb<10  - course complicated by DIC with fibrinogen of 82 on 9/10 got 5 unit(s) of cryo  --switch heparin ggt to bival given high heparin requirements, clotting in the venous cannula and fibrin in the circuit despite heparin goal aptt 40-80  --US LE w/ arterial duplex per ECMO protocol   --DVT PPX: bival as above     Endocrinology: No known medical history. BG elevated.  --insulin gtt   Lines: R  Femoral Sept 9 2019 and internal jugular 9/17/19 venous ECMO cannulae   R radial arterial line September 9, 2019  ETT September 9, 2019  Underwood catheter September 9, 2019  OG tube September 9, 2019  Restraint: needed    Current lines are required for patient management       Family update by me today: Yes      Code  "Status:     The pt was discussed and evaluated with Dr. Tk Chilel,     Interval History   Went for decannulation course complicated by hypercarbic respiratory failure requiring transition to VV ECMO  Copious secretions from ETT  Net positive 1.3L   On one of precedex and 6 versed    Ventilation Mode: CMV/AC  (Continuous Mandatory Ventilation/ Assist Control)  FiO2 (%): 50 %  Rate Set (breaths/minute): 12 breaths/min  Tidal Volume Set (mL): (S) 400 mL (per MD)  PEEP (cm H2O): 8 cmH2O  Oxygen Concentration (%): 50 %  Resp: 13        Physical Exam   Temp: 98.8  F (37.1  C) Temp src: Esophageal     Heart Rate: 77 Resp: 13 SpO2: 100 % O2 Device: Mechanical Ventilator    Vitals:    09/16/19 0200 09/17/19 0200 09/18/19 0200   Weight: (!) 182 kg (401 lb 3.8 oz) (!) 182 kg (401 lb 3.8 oz) (!) 180 kg (396 lb 13.3 oz)     Vital Signs with Ranges  Temp:  [97  F (36.1  C)-100.6  F (38.1  C)] 98.8  F (37.1  C)  Heart Rate:  [] 77  Resp:  [12-22] 13  MAP:  [63 mmHg-102 mmHg] 76 mmHg  Arterial Line BP: (105-185)/(40-65) 117/57  FiO2 (%):  [50 %-60 %] 50 %  SpO2:  [94 %-100 %] 100 %  I/O last 3 completed shifts:  In: 7275.6 [I.V.:4002.6; NG/GT:635]  Out: 7255 [Urine:6515; Emesis/NG output:140; Blood:600]    Heart Rate: 77, Blood pressure 130/68, pulse 86, temperature 98.8  F (37.1  C), resp. rate 13, height 1.676 m (5' 5.98\"), weight (!) 180 kg (396 lb 13.3 oz), SpO2 100 %, not currently breastfeeding.  396 lbs 13.25 oz  GEN:  Morbidly obese sedated and intubated  CV:  Distant heart sounds   LUNGS:  Decreased breath sounds  ABD:  Active bowel sounds, soft, non-tender/non-distended.  No rebound/guarding/rigidity.  EXT:  No edema or cyanosis.  ECMO canula in place with only scant oozing   Underwood in place    Medications     Bivalirudin (ANGIOMAX) 250mg/250mL in 0.9% Sodium Chloride       dexmedetomidine 1 mcg/kg/hr (09/18/19 0900)     IV fluid REPLACEMENT ONLY       IV fluid REPLACEMENT ONLY       " DOBUTamine 2.5 mcg/kg/min (09/18/19 0900)     EPINEPHrine IV infusion ADULT Stopped (09/17/19 1330)     fentaNYL 100 mcg/hr (09/18/19 0900)     - MEDICATION INSTRUCTIONS -       midazolam 6 mg/hr (09/18/19 0900)     - MEDICATION INSTRUCTIONS -       norepinephrine 0.03 mcg/kg/min (09/18/19 0900)     - MEDICATION INSTRUCTIONS -         acetylcysteine  2 mL Nebulization Q4H     artificial tears   Both Eyes Q8H     furosemide  80 mg Intravenous Q6H     insulin aspart  1-6 Units Subcutaneous Q4H     ipratropium - albuterol 0.5 mg/2.5 mg/3 mL  3 mL Nebulization 4x daily     miconazole   Topical BID     multivitamin w/minerals  1 tablet Oral or Feeding Tube Daily     pantoprazole (PROTONIX) IV  40 mg Intravenous BID     piperacillin-tazobactam  3.375 g Intravenous Q6H     polyethylene glycol  17 g Oral or Feeding Tube BID     senna-docusate  2 tablet Oral or Feeding Tube QPM     sodium chloride (PF)  3 mL Intracatheter Q8H     vitamin B1  100 mg Oral or Feeding Tube Daily       Data   Recent Labs   Lab 09/18/19  0537 09/18/19  0346 09/18/19  0334 09/18/19  0125  09/17/19  2141  09/17/19  1609 09/17/19  1257   WBC  --  13.4*  --   --   --  10.8  --  13.0* 17.5*   HGB  --  8.7*  --   --   --  8.6*  --  8.3* 8.8*   MCV  --  92  --   --   --  92  --  93 93   PLT  --  111*  --   --   --  92*  --  80* 100*   INR  --  1.26*  --   --   --  1.24*  --  1.27* 1.26*   NA  --  139  --   --   --  138  --  139 138   POTASSIUM 3.8 4.2 4.2 3.9   < > 4.4  4.4   < > 3.7  3.7 3.5  3.5   CHLORIDE  --  103  --   --   --  104  --  105 102   CO2  --  28  --   --   --  30  --  28 27   BUN  --  32*  --   --   --  28  --  28 30   CR  --  0.89  --   --   --  0.85  --  0.82 0.83   ANIONGAP  --  7  --   --   --  4  --  6 8   MARIA INES  --  8.6  --   --   --  8.9  --  8.6 8.6   GLC  --  119* 125* 127*   < > 114*  116*   < > 137*  136* 173*   ALBUMIN  --  2.2*  --   --   --   --   --   --  2.3*   PROTTOTAL  --  6.2*  --   --   --   --   --   --  6.1*    BILITOTAL  --  0.7  --   --   --   --   --   --  0.8   ALKPHOS  --  95  --   --   --   --   --   --  84   ALT  --  19  --   --   --   --   --   --  24   AST  --  15  --   --   --   --   --   --  15   TROPI  --  <0.015  --   --   --  <0.015  --  <0.015  --     < > = values in this interval not displayed.       Recent Results (from the past 24 hour(s))   XR Chest Port 1 View    Narrative    XR CHEST PORT 1 VW  9/17/2019 1:48 PM      HISTORY: VV ecmo placement    COMPARISON: Same day chest radiograph    FINDINGS:   Single AP radiograph of the chest. Endotracheal tube tip in similar  positioning over the midthoracic trachea. Enteric feeding tubes course  beyond the field-of-view. Right central line and new right IJ ECMO  cannula overlying the SVC/right atrial junction. A superior approach  ECMO cannula over the low right atrium. Cardiac silhouette is similar  in size. Lung volumes are similar with unchanged diffuse patchy  airspace opacities. Unchanged left basilar opacities. Unchanged  bilateral pleural effusions, left greater than right. No  pneumothoraces.      Impression    IMPRESSION:   1. New right IJ ECMO cannula overlying the SVC/right atrial junction.  Unchanged lower approach ECMO cannula overlying the low right atrium.  2. Unchanged pulmonary edema.  3. Unchanged left basilar opacities, atelectasis versus consolidation.    I have personally reviewed the examination and initial interpretation  and I agree with the findings.    DALE CLEMENS MD   XR Abdomen Port 1 View    Narrative    Examination:  XR ABDOMEN PORT 1 VW 9/17/2019 1:48 PM     Comparison: 9/11/2019, 9/10/2019    History: eval ft placement    Technique: Supine radiograph of the abdomen.    Findings:   Gastric tube tip and sidehole project over the expected location of  stomach. Feeding tube tip projects over the expected location of the  fourth portion of duodenum. Nonobstructed bowel gas pattern. No  pneumatosis. Partially visualized ethmoid  cannula.      Impression    Impression:  Feeding tube tip projects over the expected location of the fourth  portion of duodenum.    I have personally reviewed the examination and initial interpretation  and I agree with the findings.    LILIANA RATLIFF   XR Chest Port 1 View    Narrative    XR CHEST PORT 1 VW  9/18/2019 12:55 AM      HISTORY: intubated    COMPARISON: Same day chest radiograph    FINDINGS:    Endotracheal tube tip in similar positioning over the midthoracic  trachea. Enteric feeding tubes course beyond the field-of-view. Right  central line and new right IJ ECMO cannula overlying the SVC/right  atrial junction. A superior approach ECMO cannula over the low right  atrium. Cardiac silhouette is similar in size. Lung volumes are  similar with improved diffuse patchy airspace opacities. Unchanged  left basilar opacities. Improved bilateral pleural effusions, left  greater than right. No pneumothoraces.      Impression    IMPRESSION:   Improved pulmonary edema with decreased moderate left pleural  effusion.    I have personally reviewed the examination and initial interpretation  and I agree with the findings.    LAURA ESCOBEDO MD     Critical Care Services Progress Note:     Shira Rodriguez remains critically ill with shock and PE     I personally examined and evaluated the patient today.   The patient s prognosis today is grave  I have evaluated all laboratory values and imaging studies from the past 24 hours.  Key findings and decisions made today included   - switch heparin ggt to bival given high heparin requirements, clotting in the venous cannula and fibrin in the circuit despite heparin  - ETT in advanced will pull back 3 cm   - pip/tazo follow cultures  - aggressive pulmonary toileting, mucomyst scheduled duonebs   - wean NE continue dobutamine  I personally managed the ventilator, sedation, pain control and analgesia, metabolic abnormalities, antibiotic therapy, nutritional status and  vasoactive medications.   Consults ongoing and ordered are none  Procedures that will happen today are: none  All treatments were placed at my direction.  I formulated today s plan with the house staff team or resident(s) and agree with the findings and plan in the associated note.       The above plans and care have been discussed with none and all questions and concerns were addressed.     I spent a total of 60 minutes (excluding procedure time) personally providing and directing critical care services at the bedside and on the critical care unit for Shira Rodriguez.        Richard Frey MD

## 2019-09-18 NOTE — PROGRESS NOTES
ECMO Shift Summary:    Patient remains on VV ECMO, all equipment is functioning and alarms are appropriately set. RPM's 3400 with flow range 3.8-4.1 L/min. Sweep gas is at 3 LPM and FiO2 100%. Circuit remains free of visible air and clot.  Fibrin appreciated faintly on the post side of the oxygenator, at connectors and on the underside of the arterial cannula. Cannulas are secure with no bleeding from either site. Extremities are warm to the touch.    Significant Shift Events:   Titrated Heparin gtt and bolused as needed to maintain ordered ACT parameters of 180-200.  (Heparin gtt increased from 3500 to 4000 unit(s)/hr this shift and four 3000 unit boluses were delivered.)  Fibrin deposits noted on new RIJ cannula (return oxygenated blood here) and appears to be increasing, Resident notified.    Vent settings:  CMV 12/450/+5/50%.  BS coarse with expiratory wheezes at times.  Given Duoneb QID.  Suctioned for moderate thick tan/red secretions.    Heparin is running at 4000 unit(s)/hr (67.5 u/kg/hr), ACT range 146-187.    Urine output is ~300 ml/hr of yellow urine, on Q6 scheduled 80 mg Lasix.  Blood loss was not appreciated. Product was not given.      Intake/Output Summary (Last 24 hours) at 2019 0540  Last data filed at 2019 0500  Gross per 24 hour   Intake 7413.04 ml   Output 6490 ml   Net 923.04 ml       ECHO:  No results found for this or any previous visit.  Results for orders placed in visit on 18   Echocardiogram Complete    Narrative [id number]  ECH19  [id number]  [id number]^CARRINGTON^VINOD^[facility]  [facility]-3rd Floor  [address] [place] [address]     Name: XAVIER ROWELL  MRN: [id number]  : 1960  Study Date: 2018 05:59 PM  Age: 58 yrs  Gender: Female  Patient Location: [facility] [place]  Reason For Study: Cough, Edema extremities  Ordering Physician: VINOD SHULTZ  Referring Physician: CARRINGTON  VINOD PEDRO  Performed By: Ryley Ridley JENNIFER     BSA: 2.7 m2  Height: 66 in  Weight: 418 lb  HR: 85  BP: 148/77 mmHg  _____________________________________________________________________________  __        Procedure  Echocardiogram with two-dimensional, color and spectral Doppler performed.  _____________________________________________________________________________  __        Interpretation Summary  Moderate concentric wall thickening consistent with left ventricular  hypertrophy is present.  Biventricular size and systolic function is normal.The LVEF is 55-60%.  No significant valve disease.  Inferior vena cava appers dilated measuring 2.4 cm  _____________________________________________________________________________  __        Left Ventricle  Left ventricular size is normal. Left ventricular function is normal.The EF is  55-60%. Moderate concentric wall thickening consistent with left ventricular  hypertrophy is present. Left ventricular diastolic function is indeterminate.  Regional wall motion is probably normal. Endocardial border definition  suboptimal and contrast not given.     Right Ventricle  The right ventricle is normal size. Global right ventricular function is  normal.     Atria  Both atria appear normal.     Mitral Valve  Mild mitral annular calcification is present. Trace mitral insufficiency is  present.        Aortic Valve  Mild aortic valve sclerosis is present. No aortic regurgitation is present.  Transvalvular Doppler is normal and without signs of signifcant stenosis.     Tricuspid Valve  The peak velocity of the tricuspid regurgitant jet is not obtainable.  Pulmonary artery systolic pressure cannot be assessed.     Pulmonic Valve  The pulmonic valve cannot be assessed.     Vessels  The aorta root is normal. Inferior vena cava appers dilated measuring 2.4 cm.  Ascending aorta 3.3 cm.     Pericardium  No pericardial effusion is present.      _____________________________________________________________________________  __  MMode/2D Measurements & Calculations  IVSd: 1.5 cm  LVIDd: 5.4 cm  LVIDs: 3.7 cm  LVPWd: 1.3 cm  FS: 30.5 %  LV mass(C)d: 323.5 grams  LV mass(C)dI: 118.2 grams/m2     asc Aorta Diam: 3.3 cm  LVOT diam: 2.2 cm  LVOT area: 4.0 cm2  LA Volume (BP): 71.6 ml  LA Volume Index (BP): 26.1 ml/m2  RWT: 0.47        Doppler Measurements & Calculations  MV E max emile: 67.4 cm/sec  MV A max emile: 88.8 cm/sec  MV E/A: 0.76  MV dec time: 0.17 sec  Ao V2 max: 203.2 cm/sec  Ao max P.0 mmHg  MERON(V,D): 2.3 cm2     LV V1 max P.5 mmHg  LV V1 max: 116.8 cm/sec  AV Emile Ratio (DI): 0.57  E/E' av.0  Lateral E/e': 13.4  Medial E/e': 12.6     _____________________________________________________________________________  __           Report approved by: Tariq Pappas 2018 10:50 AM          CXR:  Recent Results (from the past 24 hour(s))   XR Chest Port 1 View    Narrative    XR CHEST PORT 1 VW  2019 1:48 PM      HISTORY: VV ecmo placement    COMPARISON: Same day chest radiograph    FINDINGS:   Single AP radiograph of the chest. Endotracheal tube tip in similar  positioning over the midthoracic trachea. Enteric feeding tubes course  beyond the field-of-view. Right central line and new right IJ ECMO  cannula overlying the SVC/right atrial junction. A superior approach  ECMO cannula over the low right atrium. Cardiac silhouette is similar  in size. Lung volumes are similar with unchanged diffuse patchy  airspace opacities. Unchanged left basilar opacities. Unchanged  bilateral pleural effusions, left greater than right. No  pneumothoraces.      Impression    IMPRESSION:   1. New right IJ ECMO cannula overlying the SVC/right atrial junction.  Unchanged lower approach ECMO cannula overlying the low right atrium.  2. Unchanged pulmonary edema.  3. Unchanged left basilar opacities, atelectasis versus consolidation.    I have  personally reviewed the examination and initial interpretation  and I agree with the findings.    DALE CLEMENS MD   XR Abdomen Port 1 View    Narrative    Examination:  XR ABDOMEN PORT 1 VW 9/17/2019 1:48 PM     Comparison: 9/11/2019, 9/10/2019    History: eval ft placement    Technique: Supine radiograph of the abdomen.    Findings:   Gastric tube tip and sidehole project over the expected location of  stomach. Feeding tube tip projects over the expected location of the  fourth portion of duodenum. Nonobstructed bowel gas pattern. No  pneumatosis. Partially visualized ethmoid cannula.      Impression    Impression:  Feeding tube tip projects over the expected location of the fourth  portion of duodenum.    I have personally reviewed the examination and initial interpretation  and I agree with the findings.    LILIANA RATLIFF       Labs:  Recent Labs   Lab 09/18/19  0401 09/18/19  0334 09/18/19  0125 09/17/19  2325 09/17/19  2141   PH  --  7.48* 7.45 7.45 7.43   PCO2  --  41 45 45 45   PO2  --  72* 80 84 82   HCO3  --  30* 31* 31* 30*   O2PER 50 50 50 50 50       Lab Results   Component Value Date    HGB 8.7 (L) 09/18/2019    PHGB <30 09/18/2019     (L) 09/18/2019    FIBR 976 (H) 09/18/2019    INR 1.26 (H) 09/18/2019     (HH) 09/18/2019    DD 12.5 (H) 09/18/2019    AXA 0.62 09/18/2019    ANTCH 57 (L) 09/17/2019         Plan is to continue VV ECMO support.      Mindi Blake, RT, RRT  9/18/2019 5:40 AM

## 2019-09-18 NOTE — PROGRESS NOTES
ECMO Shift Summary:    Patient remains on VV ECMO, all equipment is functioning and alarms are appropriately set. RPM's 3400 with flow range 3.8-3.9 L/min. Sweep gas is OFF. Circuit remains free of air and clot; fibrin strand noted in RIJ cannula and at connectors. Cannulas are secure with no bleeding from site. Extremities are warm. Suctioned ETT for moderate amount of white secretions, much less bloody today.    Significant Shift Events: Heparin drip stopped and Bivalirudin started at 09:30, follow PTT, goal 44-88. PTT has been within goal on 0.15 mg/kg/hr bivalirudin. Sweep gas was discontinued at 11:15 am, patient has tolerated well with minor increase in O2 from 50-60%.    Vent settings:  Ventilation Mode: CMV/AC  (Continuous Mandatory Ventilation/ Assist Control)  FiO2 (%): (S) 60 %  Rate Set (breaths/minute): 12 breaths/min  Tidal Volume Set (mL): 450 mL  PEEP (cm H2O): 8 cmH2O  Oxygen Concentration (%): 60 %  Resp: 26  .    Heparin is off, Bivalirudin is running at 15 u/kg/hr, PTT range 84-85.    Urine output is good, blood loss was none. Product given included none.      Intake/Output Summary (Last 24 hours) at 2019 1756  Last data filed at 2019 1700  Gross per 24 hour   Intake 5968.96 ml   Output 7905 ml   Net -1936.04 ml       ECHO:  No results found for this or any previous visit.  Results for orders placed in visit on 18   Echocardiogram Complete    Narrative [id number]  Former[facility]19  [id number]  [id number]^CARRINGTON^VINOD^WILLIS           [facility]  Diagnostic and Treamtent-3rd Floor  [address] [place] [id number]     Name: XAVIER ROWELL  MRN: [id number]  : 1960  Study Date: 2018 05:59 PM  Age: 58 yrs  Gender: Female  Patient Location: [facility]  Reason For Study: Cough, Edema extremities  Ordering Physician: VINOD SHULTZ  Referring Physician: VINOD SHULTZ  Performed By: Ryley Ridley RDCS     BSA: 2.7 m2  Height: 66  in  Weight: 418 lb  HR: 85  BP: 148/77 mmHg  _____________________________________________________________________________  __        Procedure  Echocardiogram with two-dimensional, color and spectral Doppler performed.  _____________________________________________________________________________  __        Interpretation Summary  Moderate concentric wall thickening consistent with left ventricular  hypertrophy is present.  Biventricular size and systolic function is normal.The LVEF is 55-60%.  No significant valve disease.  Inferior vena cava appers dilated measuring 2.4 cm  _____________________________________________________________________________  __        Left Ventricle  Left ventricular size is normal. Left ventricular function is normal.The EF is  55-60%. Moderate concentric wall thickening consistent with left ventricular  hypertrophy is present. Left ventricular diastolic function is indeterminate.  Regional wall motion is probably normal. Endocardial border definition  suboptimal and contrast not given.     Right Ventricle  The right ventricle is normal size. Global right ventricular function is  normal.     Atria  Both atria appear normal.     Mitral Valve  Mild mitral annular calcification is present. Trace mitral insufficiency is  present.        Aortic Valve  Mild aortic valve sclerosis is present. No aortic regurgitation is present.  Transvalvular Doppler is normal and without signs of signifcant stenosis.     Tricuspid Valve  The peak velocity of the tricuspid regurgitant jet is not obtainable.  Pulmonary artery systolic pressure cannot be assessed.     Pulmonic Valve  The pulmonic valve cannot be assessed.     Vessels  The aorta root is normal. Inferior vena cava appers dilated measuring 2.4 cm.  Ascending aorta 3.3 cm.     Pericardium  No pericardial effusion is present.     _____________________________________________________________________________  __  MMode/2D Measurements &  Calculations  IVSd: 1.5 cm  LVIDd: 5.4 cm  LVIDs: 3.7 cm  LVPWd: 1.3 cm  FS: 30.5 %  LV mass(C)d: 323.5 grams  LV mass(C)dI: 118.2 grams/m2     asc Aorta Diam: 3.3 cm  LVOT diam: 2.2 cm  LVOT area: 4.0 cm2  LA Volume (BP): 71.6 ml  LA Volume Index (BP): 26.1 ml/m2  RWT: 0.47        Doppler Measurements & Calculations  MV E max emile: 67.4 cm/sec  MV A max emile: 88.8 cm/sec  MV E/A: 0.76  MV dec time: 0.17 sec  Ao V2 max: 203.2 cm/sec  Ao max P.0 mmHg  MERON(V,D): 2.3 cm2     LV V1 max P.5 mmHg  LV V1 max: 116.8 cm/sec  AV Emile Ratio (DI): 0.57  E/E' av.0  Lateral E/e': 13.4  Medial E/e': 12.6     _____________________________________________________________________________  __           Report approved by: Tariq Pappas 2018 10:50 AM          CXR:  Recent Results (from the past 24 hour(s))   XR Chest Port 1 View    Narrative    XR CHEST PORT 1 VW  2019 12:55 AM      HISTORY: intubated    COMPARISON: Same day chest radiograph    FINDINGS:    Endotracheal tube tip in similar positioning over the midthoracic  trachea. Enteric feeding tubes course beyond the field-of-view. Right  central line and new right IJ ECMO cannula overlying the SVC/right  atrial junction. A superior approach ECMO cannula over the low right  atrium. Cardiac silhouette is similar in size. Lung volumes are  similar with improved diffuse patchy airspace opacities. Unchanged  left basilar opacities. Improved bilateral pleural effusions, left  greater than right. No pneumothoraces.      Impression    IMPRESSION:   Improved pulmonary edema with decreased moderate left pleural  effusion.    I have personally reviewed the examination and initial interpretation  and I agree with the findings.    LAURA ESCOBEDO MD       Labs:  Recent Labs   Lab 19  1557 19  1357 19  1209 19  1122   PH 7.44 7.44 7.45 7.44   PCO2 47* 48* 48* 48*   PO2 107* 100 66* 75*   HCO3 32* 32* 33* 32*   O2PER 60 60 50 50       Lab  Results   Component Value Date    HGB 8.4 (L) 09/18/2019    PHGB <30 09/18/2019     (L) 09/18/2019    FIBR 857 (H) 09/18/2019    INR 2.40 (H) 09/18/2019    PTT 84 (H) 09/18/2019    DD 15.2 (H) 09/18/2019    AXA 0.62 09/18/2019    ANTCH 75 (L) 09/18/2019         Plan is to continue with zero sweep as tolerated and decannulate at bedside tomorrow.      Deja Ash, RT, RRT  9/18/2019 5:56 PM

## 2019-09-19 ENCOUNTER — APPOINTMENT (OUTPATIENT)
Dept: GENERAL RADIOLOGY | Facility: CLINIC | Age: 59
DRG: 003 | End: 2019-09-19
Payer: COMMERCIAL

## 2019-09-19 LAB
7AMINOCLONAZEPAM BLD CFM-MCNC: NEGATIVE NG/ML
ABO + RH BLD: NORMAL
ABO + RH BLD: NORMAL
ALBUMIN SERPL-MCNC: 2.2 G/DL (ref 3.4–5)
ALBUMIN SERPL-MCNC: 2.3 G/DL (ref 3.4–5)
ALP SERPL-CCNC: 92 U/L (ref 40–150)
ALP SERPL-CCNC: 98 U/L (ref 40–150)
ALPRAZ SERPL-MCNC: NEGATIVE NG/ML
ALT SERPL W P-5'-P-CCNC: 17 U/L (ref 0–50)
ALT SERPL W P-5'-P-CCNC: 21 U/L (ref 0–50)
AMPHETAMINES SPEC-MCNC: NEGATIVE NG/ML
ANION GAP SERPL CALCULATED.3IONS-SCNC: 10 MMOL/L (ref 3–14)
ANION GAP SERPL CALCULATED.3IONS-SCNC: 2 MMOL/L (ref 3–14)
ANION GAP SERPL CALCULATED.3IONS-SCNC: 6 MMOL/L (ref 3–14)
ANION GAP SERPL CALCULATED.3IONS-SCNC: 8 MMOL/L (ref 3–14)
APAP BLD-MCNC: NEGATIVE UG/ML
APTT PPP: 46 SEC (ref 22–37)
APTT PPP: 50 SEC (ref 22–37)
APTT PPP: 57 SEC (ref 22–37)
APTT PPP: 68 SEC (ref 22–37)
APTT PPP: 68 SEC (ref 22–37)
APTT PPP: 90 SEC (ref 22–37)
AST SERPL W P-5'-P-CCNC: 16 U/L (ref 0–45)
AST SERPL W P-5'-P-CCNC: 18 U/L (ref 0–45)
AT III ACT/NOR PPP CHRO: 72 % (ref 85–135)
BACTERIA SPEC CULT: NO GROWTH
BACTERIA SPEC CULT: NO GROWTH
BARBITURATES SPEC-MCNC: NEGATIVE UG/ML
BASE EXCESS BLDA CALC-SCNC: 10 MMOL/L
BASE EXCESS BLDA CALC-SCNC: 10 MMOL/L
BASE EXCESS BLDA CALC-SCNC: 10.1 MMOL/L
BASE EXCESS BLDA CALC-SCNC: 11 MMOL/L
BASE EXCESS BLDA CALC-SCNC: 11.3 MMOL/L
BASE EXCESS BLDA CALC-SCNC: 12.6 MMOL/L
BASE EXCESS BLDA CALC-SCNC: 9.3 MMOL/L
BASE EXCESS BLDA CALC-SCNC: 9.4 MMOL/L
BASE EXCESS BLDA CALC-SCNC: 9.5 MMOL/L
BASE EXCESS BLDA CALC-SCNC: 9.6 MMOL/L
BENZODIAZ SERPL QL: POSITIVE
BENZODIAZ SPEC-MCNC: POSITIVE NG/ML
BILIRUB SERPL-MCNC: 0.6 MG/DL (ref 0.2–1.3)
BILIRUB SERPL-MCNC: 0.8 MG/DL (ref 0.2–1.3)
BLD GP AB SCN SERPL QL: NORMAL
BLD PROD TYP BPU: NORMAL
BLD UNIT ID BPU: 0
BLOOD BANK CMNT PATIENT-IMP: NORMAL
BLOOD PRODUCT CODE: NORMAL
BPU ID: NORMAL
BUN SERPL-MCNC: 37 MG/DL (ref 7–30)
BUN SERPL-MCNC: 39 MG/DL (ref 7–30)
BUN SERPL-MCNC: 41 MG/DL (ref 7–30)
BUN SERPL-MCNC: 47 MG/DL (ref 7–30)
BUPRENORPHINE SERPLBLD-MCNC: NEGATIVE NG/ML
CA-I BLD-MCNC: 4.9 MG/DL (ref 4.4–5.2)
CA-I BLD-MCNC: 5 MG/DL (ref 4.4–5.2)
CALCIUM SERPL-MCNC: 9.1 MG/DL (ref 8.5–10.1)
CALCIUM SERPL-MCNC: 9.4 MG/DL (ref 8.5–10.1)
CALCIUM SERPL-MCNC: 9.4 MG/DL (ref 8.5–10.1)
CALCIUM SERPL-MCNC: 9.7 MG/DL (ref 8.5–10.1)
CARBOXYTHC BLD-MCNC: NEGATIVE NG/ML
CARISOPRODOL IA: NEGATIVE UG/ML
CHLORDIAZEP SERPL-MCNC: NEGATIVE NG/ML
CHLORIDE SERPL-SCNC: 101 MMOL/L (ref 94–109)
CHLORIDE SERPL-SCNC: 103 MMOL/L (ref 94–109)
CHLORIDE SERPL-SCNC: 99 MMOL/L (ref 94–109)
CHLORIDE SERPL-SCNC: 99 MMOL/L (ref 94–109)
CLONAZEPAM SERPL CFM-MCNC: NEGATIVE NG/ML
CO2 SERPL-SCNC: 29 MMOL/L (ref 20–32)
CO2 SERPL-SCNC: 32 MMOL/L (ref 20–32)
CO2 SERPL-SCNC: 33 MMOL/L (ref 20–32)
CO2 SERPL-SCNC: 35 MMOL/L (ref 20–32)
COCAINE METABOLITE IA: NEGATIVE NG/ML
CREAT SERPL-MCNC: 0.86 MG/DL (ref 0.52–1.04)
CREAT SERPL-MCNC: 0.91 MG/DL (ref 0.52–1.04)
CREAT SERPL-MCNC: 0.93 MG/DL (ref 0.52–1.04)
CREAT SERPL-MCNC: 0.95 MG/DL (ref 0.52–1.04)
CRP SERPL-MCNC: 180 MG/L (ref 0–8)
D DIMER PPP FEU-MCNC: 19 UG/ML FEU (ref 0–0.5)
D DIMER PPP FEU-MCNC: >20 UG/ML FEU (ref 0–0.5)
DECLARED MEDICATIONS: ABNORMAL
DESALKYLFLURAZ SERPL-MCNC: NEGATIVE NG/ML
DIAZEPAM SERPL-MCNC: NEGATIVE NG/ML
ERYTHROCYTE [DISTWIDTH] IN BLOOD BY AUTOMATED COUNT: 15.9 % (ref 10–15)
ERYTHROCYTE [DISTWIDTH] IN BLOOD BY AUTOMATED COUNT: 16 % (ref 10–15)
ERYTHROCYTE [DISTWIDTH] IN BLOOD BY AUTOMATED COUNT: 16 % (ref 10–15)
ERYTHROCYTE [DISTWIDTH] IN BLOOD BY AUTOMATED COUNT: 16.2 % (ref 10–15)
ERYTHROCYTE [DISTWIDTH] IN BLOOD BY AUTOMATED COUNT: 16.3 % (ref 10–15)
ERYTHROCYTE [SEDIMENTATION RATE] IN BLOOD BY WESTERGREN METHOD: 125 MM/H (ref 0–30)
ETHANOL BLD-MCNC: NEGATIVE GM/DL
FENTANYL BLD CFM-MCNC: 1.5 NG/ML
FENTANYL IA: POSITIVE NG/ML
FENTANYL SPEC QL: POSITIVE
FIBRINOGEN PPP-MCNC: 857 MG/DL (ref 200–420)
FIBRINOGEN PPP-MCNC: 986 MG/DL (ref 200–420)
FLURAZEPAM SERPL-MCNC: NEGATIVE NG/ML
GABAPENTIN IA: NEGATIVE UG/ML
GFR SERPL CREATININE-BSD FRML MDRD: 66 ML/MIN/{1.73_M2}
GFR SERPL CREATININE-BSD FRML MDRD: 68 ML/MIN/{1.73_M2}
GFR SERPL CREATININE-BSD FRML MDRD: 69 ML/MIN/{1.73_M2}
GFR SERPL CREATININE-BSD FRML MDRD: 74 ML/MIN/{1.73_M2}
GLUCOSE BLD-MCNC: 140 MG/DL (ref 70–99)
GLUCOSE BLD-MCNC: 155 MG/DL (ref 70–99)
GLUCOSE BLD-MCNC: 158 MG/DL (ref 70–99)
GLUCOSE BLD-MCNC: 180 MG/DL (ref 70–99)
GLUCOSE SERPL-MCNC: 126 MG/DL (ref 70–99)
GLUCOSE SERPL-MCNC: 130 MG/DL (ref 70–99)
GLUCOSE SERPL-MCNC: 149 MG/DL (ref 70–99)
GLUCOSE SERPL-MCNC: 169 MG/DL (ref 70–99)
HCO3 BLD-SCNC: 34 MMOL/L (ref 21–28)
HCO3 BLD-SCNC: 34 MMOL/L (ref 21–28)
HCO3 BLD-SCNC: 35 MMOL/L (ref 21–28)
HCO3 BLD-SCNC: 36 MMOL/L (ref 21–28)
HCO3 BLD-SCNC: 36 MMOL/L (ref 21–28)
HCO3 BLD-SCNC: 37 MMOL/L (ref 21–28)
HCT VFR BLD AUTO: 27.9 % (ref 35–47)
HCT VFR BLD AUTO: 28.3 % (ref 35–47)
HCT VFR BLD AUTO: 28.5 % (ref 35–47)
HCT VFR BLD AUTO: 29.3 % (ref 35–47)
HCT VFR BLD AUTO: 29.4 % (ref 35–47)
HGB BLD-MCNC: 8.5 G/DL (ref 11.7–15.7)
HGB BLD-MCNC: 8.8 G/DL (ref 11.7–15.7)
HGB BLD-MCNC: 8.8 G/DL (ref 11.7–15.7)
HGB BLD-MCNC: 9.2 G/DL (ref 11.7–15.7)
HGB BLD-MCNC: 9.2 G/DL (ref 11.7–15.7)
HGB BLD-MCNC: 9.4 G/DL (ref 11.7–15.7)
HGB FREE PLAS-MCNC: <30 MG/DL
INR PPP: 1.33 (ref 0.86–1.14)
INR PPP: 1.42 (ref 0.86–1.14)
INR PPP: 2.04 (ref 0.86–1.14)
INR PPP: 2.48 (ref 0.86–1.14)
LACTATE BLD-SCNC: 0.4 MMOL/L (ref 0.7–2)
LACTATE BLD-SCNC: 0.6 MMOL/L (ref 0.7–2)
LACTATE BLD-SCNC: 0.7 MMOL/L (ref 0.7–2)
LACTATE BLD-SCNC: 0.7 MMOL/L (ref 0.7–2)
LDH SERPL L TO P-CCNC: 386 U/L (ref 81–234)
LORAZEPAM BLD CFM-MCNC: NEGATIVE NG/ML
Lab: NORMAL
Lab: NORMAL
MAGNESIUM SERPL-MCNC: 2.2 MG/DL (ref 1.6–2.3)
MAGNESIUM SERPL-MCNC: 2.3 MG/DL (ref 1.6–2.3)
MAGNESIUM SERPL-MCNC: 2.4 MG/DL (ref 1.6–2.3)
MAGNESIUM SERPL-MCNC: 2.5 MG/DL (ref 1.6–2.3)
MCH RBC QN AUTO: 28.3 PG (ref 26.5–33)
MCH RBC QN AUTO: 28.9 PG (ref 26.5–33)
MCH RBC QN AUTO: 29.1 PG (ref 26.5–33)
MCHC RBC AUTO-ENTMCNC: 30.5 G/DL (ref 31.5–36.5)
MCHC RBC AUTO-ENTMCNC: 30.9 G/DL (ref 31.5–36.5)
MCHC RBC AUTO-ENTMCNC: 31.1 G/DL (ref 31.5–36.5)
MCHC RBC AUTO-ENTMCNC: 31.3 G/DL (ref 31.5–36.5)
MCHC RBC AUTO-ENTMCNC: 31.4 G/DL (ref 31.5–36.5)
MCV RBC AUTO: 92 FL (ref 78–100)
MCV RBC AUTO: 93 FL (ref 78–100)
MCV RBC AUTO: 94 FL (ref 78–100)
MEPERIDINE SERPLBLD-MCNC: NEGATIVE NG/ML
METHADONE BLD-MCNC: NEGATIVE NG/ML
MIDAZOLAM BLD CFM-MCNC: 230 NG/ML
MISCELLANEOUS TEST: NORMAL
NORCHLORDIAZEP SERPL-MCNC: NEGATIVE NG/ML
NORDIAZEPAM SERPL-MCNC: NEGATIVE NG/ML
NORFENTANYL BLD CFM-MCNC: NEGATIVE NG/ML
NUM BPU REQUESTED: 1
NUM BPU REQUESTED: 3
O2/TOTAL GAS SETTING VFR VENT: 50 %
O2/TOTAL GAS SETTING VFR VENT: 60 %
OPIATES SPEC-MCNC: NEGATIVE NG/ML
OTHER DRUGS DETECTED: ABNORMAL
OXAZEPAM SERPL-MCNC: NEGATIVE NG/ML
OXYCODONE SERPLBLD SCN-MCNC: NEGATIVE NG/ML
OXYHGB MFR BLD: 94 % (ref 92–100)
OXYHGB MFR BLD: 94 % (ref 92–100)
OXYHGB MFR BLD: 95 % (ref 92–100)
OXYHGB MFR BLD: 96 % (ref 92–100)
OXYHGB MFR BLD: 96 % (ref 92–100)
OXYHGB MFR BLD: 97 % (ref 92–100)
OXYHGB MFR BLD: 97 % (ref 92–100)
PCO2 BLD: 46 MM HG (ref 35–45)
PCO2 BLD: 47 MM HG (ref 35–45)
PCO2 BLD: 47 MM HG (ref 35–45)
PCO2 BLD: 48 MM HG (ref 35–45)
PCO2 BLD: 49 MM HG (ref 35–45)
PCO2 BLD: 49 MM HG (ref 35–45)
PCP SPEC-MCNC: NEGATIVE NG/ML
PH BLD: 7.46 PH (ref 7.35–7.45)
PH BLD: 7.46 PH (ref 7.35–7.45)
PH BLD: 7.47 PH (ref 7.35–7.45)
PH BLD: 7.49 PH (ref 7.35–7.45)
PH BLD: 7.49 PH (ref 7.35–7.45)
PH BLD: 7.52 PH (ref 7.35–7.45)
PHOSPHATE SERPL-MCNC: 4.3 MG/DL (ref 2.5–4.5)
PHOSPHATE SERPL-MCNC: 4.9 MG/DL (ref 2.5–4.5)
PLATELET # BLD AUTO: 108 10E9/L (ref 150–450)
PLATELET # BLD AUTO: 145 10E9/L (ref 150–450)
PLATELET # BLD AUTO: 150 10E9/L (ref 150–450)
PLATELET # BLD AUTO: 54 10E9/L (ref 150–450)
PLATELET # BLD AUTO: 98 10E9/L (ref 150–450)
PLATELET # BLD AUTO: 99 10E9/L (ref 150–450)
PO2 BLD: 104 MM HG (ref 80–105)
PO2 BLD: 105 MM HG (ref 80–105)
PO2 BLD: 76 MM HG (ref 80–105)
PO2 BLD: 80 MM HG (ref 80–105)
PO2 BLD: 82 MM HG (ref 80–105)
PO2 BLD: 85 MM HG (ref 80–105)
PO2 BLD: 96 MM HG (ref 80–105)
PO2 BLD: 96 MM HG (ref 80–105)
POTASSIUM BLD-SCNC: 4 MMOL/L (ref 3.4–5.3)
POTASSIUM BLD-SCNC: 4.6 MMOL/L (ref 3.4–5.3)
POTASSIUM BLD-SCNC: 4.7 MMOL/L (ref 3.4–5.3)
POTASSIUM BLD-SCNC: 4.8 MMOL/L (ref 3.4–5.3)
POTASSIUM SERPL-SCNC: 3.8 MMOL/L (ref 3.4–5.3)
POTASSIUM SERPL-SCNC: 3.8 MMOL/L (ref 3.4–5.3)
POTASSIUM SERPL-SCNC: 4.3 MMOL/L (ref 3.4–5.3)
POTASSIUM SERPL-SCNC: 4.7 MMOL/L (ref 3.4–5.3)
PROPOXYPH SPEC-MCNC: NEGATIVE NG/ML
PROT SERPL-MCNC: 6.2 G/DL (ref 6.8–8.8)
PROT SERPL-MCNC: 6.9 G/DL (ref 6.8–8.8)
RBC # BLD AUTO: 3 10E12/L (ref 3.8–5.2)
RBC # BLD AUTO: 3.04 10E12/L (ref 3.8–5.2)
RBC # BLD AUTO: 3.05 10E12/L (ref 3.8–5.2)
RBC # BLD AUTO: 3.16 10E12/L (ref 3.8–5.2)
RBC # BLD AUTO: 3.18 10E12/L (ref 3.8–5.2)
SODIUM SERPL-SCNC: 138 MMOL/L (ref 133–144)
SODIUM SERPL-SCNC: 139 MMOL/L (ref 133–144)
SODIUM SERPL-SCNC: 139 MMOL/L (ref 133–144)
SODIUM SERPL-SCNC: 140 MMOL/L (ref 133–144)
SPECIMEN EXP DATE BLD: NORMAL
SPECIMEN SOURCE: NORMAL
SPECIMEN SOURCE: NORMAL
TEMAZEPAM SERPL-MCNC: NEGATIVE NG/ML
TRAMADOL BLD-MCNC: NEGATIVE NG/ML
TRANSFUSION STATUS PATIENT QL: NORMAL
TRIAZOLAM BLD-MCNC: NEGATIVE NG/ML
TROPONIN I SERPL-MCNC: <0.015 UG/L (ref 0–0.04)
WBC # BLD AUTO: 10.8 10E9/L (ref 4–11)
WBC # BLD AUTO: 11.5 10E9/L (ref 4–11)
WBC # BLD AUTO: 13.1 10E9/L (ref 4–11)
WBC # BLD AUTO: 16.7 10E9/L (ref 4–11)
WBC # BLD AUTO: 17 10E9/L (ref 4–11)

## 2019-09-19 PROCEDURE — 82947 ASSAY GLUCOSE BLOOD QUANT: CPT | Performed by: STUDENT IN AN ORGANIZED HEALTH CARE EDUCATION/TRAINING PROGRAM

## 2019-09-19 PROCEDURE — 25000128 H RX IP 250 OP 636: Performed by: SURGERY

## 2019-09-19 PROCEDURE — P9037 PLATE PHERES LEUKOREDU IRRAD: HCPCS | Performed by: STUDENT IN AN ORGANIZED HEALTH CARE EDUCATION/TRAINING PROGRAM

## 2019-09-19 PROCEDURE — 94003 VENT MGMT INPAT SUBQ DAY: CPT

## 2019-09-19 PROCEDURE — 20000004 ZZH R&B ICU UMMC

## 2019-09-19 PROCEDURE — 25800030 ZZH RX IP 258 OP 636: Performed by: STUDENT IN AN ORGANIZED HEALTH CARE EDUCATION/TRAINING PROGRAM

## 2019-09-19 PROCEDURE — 94640 AIRWAY INHALATION TREATMENT: CPT | Mod: 76

## 2019-09-19 PROCEDURE — 40000014 ZZH STATISTIC ARTERIAL MONITORING DAILY

## 2019-09-19 PROCEDURE — 33948 ECMO/ECLS DAILY MGMT-VENOUS: CPT

## 2019-09-19 PROCEDURE — 83051 HEMOGLOBIN PLASMA: CPT | Performed by: SURGERY

## 2019-09-19 PROCEDURE — 85300 ANTITHROMBIN III ACTIVITY: CPT | Performed by: SURGERY

## 2019-09-19 PROCEDURE — 85027 COMPLETE CBC AUTOMATED: CPT | Performed by: STUDENT IN AN ORGANIZED HEALTH CARE EDUCATION/TRAINING PROGRAM

## 2019-09-19 PROCEDURE — 85610 PROTHROMBIN TIME: CPT | Performed by: SURGERY

## 2019-09-19 PROCEDURE — 84100 ASSAY OF PHOSPHORUS: CPT | Performed by: SURGERY

## 2019-09-19 PROCEDURE — 85610 PROTHROMBIN TIME: CPT | Performed by: STUDENT IN AN ORGANIZED HEALTH CARE EDUCATION/TRAINING PROGRAM

## 2019-09-19 PROCEDURE — 85652 RBC SED RATE AUTOMATED: CPT | Performed by: SURGERY

## 2019-09-19 PROCEDURE — C9113 INJ PANTOPRAZOLE SODIUM, VIA: HCPCS | Performed by: SURGERY

## 2019-09-19 PROCEDURE — 25000132 ZZH RX MED GY IP 250 OP 250 PS 637: Performed by: SURGERY

## 2019-09-19 PROCEDURE — 99233 SBSQ HOSP IP/OBS HIGH 50: CPT | Mod: 25 | Performed by: INTERNAL MEDICINE

## 2019-09-19 PROCEDURE — 85379 FIBRIN DEGRADATION QUANT: CPT | Performed by: SURGERY

## 2019-09-19 PROCEDURE — 82330 ASSAY OF CALCIUM: CPT | Performed by: STUDENT IN AN ORGANIZED HEALTH CARE EDUCATION/TRAINING PROGRAM

## 2019-09-19 PROCEDURE — 84484 ASSAY OF TROPONIN QUANT: CPT | Performed by: SURGERY

## 2019-09-19 PROCEDURE — 27210437 ZZH NUTRITION PRODUCT SEMIELEM INTERMED LITER

## 2019-09-19 PROCEDURE — 83605 ASSAY OF LACTIC ACID: CPT | Performed by: STUDENT IN AN ORGANIZED HEALTH CARE EDUCATION/TRAINING PROGRAM

## 2019-09-19 PROCEDURE — 83735 ASSAY OF MAGNESIUM: CPT | Performed by: SURGERY

## 2019-09-19 PROCEDURE — 25000125 ZZHC RX 250: Performed by: STUDENT IN AN ORGANIZED HEALTH CARE EDUCATION/TRAINING PROGRAM

## 2019-09-19 PROCEDURE — 25800030 ZZH RX IP 258 OP 636: Performed by: SURGERY

## 2019-09-19 PROCEDURE — 25000125 ZZHC RX 250: Performed by: SURGERY

## 2019-09-19 PROCEDURE — 82805 BLOOD GASES W/O2 SATURATION: CPT | Performed by: STUDENT IN AN ORGANIZED HEALTH CARE EDUCATION/TRAINING PROGRAM

## 2019-09-19 PROCEDURE — 71045 X-RAY EXAM CHEST 1 VIEW: CPT

## 2019-09-19 PROCEDURE — 83735 ASSAY OF MAGNESIUM: CPT | Performed by: STUDENT IN AN ORGANIZED HEALTH CARE EDUCATION/TRAINING PROGRAM

## 2019-09-19 PROCEDURE — 25000131 ZZH RX MED GY IP 250 OP 636 PS 637: Performed by: STUDENT IN AN ORGANIZED HEALTH CARE EDUCATION/TRAINING PROGRAM

## 2019-09-19 PROCEDURE — 25000128 H RX IP 250 OP 636: Performed by: STUDENT IN AN ORGANIZED HEALTH CARE EDUCATION/TRAINING PROGRAM

## 2019-09-19 PROCEDURE — 85018 HEMOGLOBIN: CPT | Performed by: STUDENT IN AN ORGANIZED HEALTH CARE EDUCATION/TRAINING PROGRAM

## 2019-09-19 PROCEDURE — 84132 ASSAY OF SERUM POTASSIUM: CPT | Performed by: STUDENT IN AN ORGANIZED HEALTH CARE EDUCATION/TRAINING PROGRAM

## 2019-09-19 PROCEDURE — 40000275 ZZH STATISTIC RCP TIME EA 10 MIN

## 2019-09-19 PROCEDURE — 85730 THROMBOPLASTIN TIME PARTIAL: CPT | Performed by: SURGERY

## 2019-09-19 PROCEDURE — 80053 COMPREHEN METABOLIC PANEL: CPT | Performed by: SURGERY

## 2019-09-19 PROCEDURE — 85049 AUTOMATED PLATELET COUNT: CPT | Performed by: STUDENT IN AN ORGANIZED HEALTH CARE EDUCATION/TRAINING PROGRAM

## 2019-09-19 PROCEDURE — 83615 LACTATE (LD) (LDH) ENZYME: CPT | Performed by: SURGERY

## 2019-09-19 PROCEDURE — 85730 THROMBOPLASTIN TIME PARTIAL: CPT | Performed by: STUDENT IN AN ORGANIZED HEALTH CARE EDUCATION/TRAINING PROGRAM

## 2019-09-19 PROCEDURE — 85384 FIBRINOGEN ACTIVITY: CPT | Performed by: SURGERY

## 2019-09-19 PROCEDURE — 85027 COMPLETE CBC AUTOMATED: CPT | Performed by: SURGERY

## 2019-09-19 PROCEDURE — 86140 C-REACTIVE PROTEIN: CPT | Performed by: SURGERY

## 2019-09-19 PROCEDURE — 80048 BASIC METABOLIC PNL TOTAL CA: CPT | Performed by: STUDENT IN AN ORGANIZED HEALTH CARE EDUCATION/TRAINING PROGRAM

## 2019-09-19 PROCEDURE — 84484 ASSAY OF TROPONIN QUANT: CPT | Performed by: STUDENT IN AN ORGANIZED HEALTH CARE EDUCATION/TRAINING PROGRAM

## 2019-09-19 RX ORDER — HEPARIN SODIUM 10000 [USP'U]/100ML
0-3500 INJECTION, SOLUTION INTRAVENOUS CONTINUOUS
Status: DISCONTINUED | OUTPATIENT
Start: 2019-09-19 | End: 2019-09-25

## 2019-09-19 RX ORDER — PREDNISONE 20 MG/1
40 TABLET ORAL DAILY
Status: COMPLETED | OUTPATIENT
Start: 2019-09-19 | End: 2019-09-23

## 2019-09-19 RX ORDER — IPRATROPIUM BROMIDE AND ALBUTEROL SULFATE 2.5; .5 MG/3ML; MG/3ML
3 SOLUTION RESPIRATORY (INHALATION) EVERY 4 HOURS
Status: DISCONTINUED | OUTPATIENT
Start: 2019-09-19 | End: 2019-09-20

## 2019-09-19 RX ADMIN — MIDAZOLAM HYDROCHLORIDE 2 MG: 2 INJECTION, SOLUTION INTRAMUSCULAR; INTRAVENOUS at 21:11

## 2019-09-19 RX ADMIN — INSULIN ASPART 1 UNITS: 100 INJECTION, SOLUTION INTRAVENOUS; SUBCUTANEOUS at 09:01

## 2019-09-19 RX ADMIN — IPRATROPIUM BROMIDE AND ALBUTEROL SULFATE 3 ML: .5; 3 SOLUTION RESPIRATORY (INHALATION) at 08:15

## 2019-09-19 RX ADMIN — MULTIPLE VITAMINS W/ MINERALS TAB 1 TABLET: TAB at 07:40

## 2019-09-19 RX ADMIN — FENTANYL CITRATE 50 MCG: 50 INJECTION, SOLUTION INTRAMUSCULAR; INTRAVENOUS at 05:00

## 2019-09-19 RX ADMIN — DEXMEDETOMIDINE 1 MCG/KG/HR: 100 INJECTION, SOLUTION, CONCENTRATE INTRAVENOUS at 00:34

## 2019-09-19 RX ADMIN — ACETYLCYSTEINE 2 ML: 200 SOLUTION ORAL; RESPIRATORY (INHALATION) at 05:26

## 2019-09-19 RX ADMIN — DEXMEDETOMIDINE 1 MCG/KG/HR: 100 INJECTION, SOLUTION, CONCENTRATE INTRAVENOUS at 12:45

## 2019-09-19 RX ADMIN — IPRATROPIUM BROMIDE AND ALBUTEROL SULFATE 3 ML: .5; 3 SOLUTION RESPIRATORY (INHALATION) at 05:26

## 2019-09-19 RX ADMIN — DEXMEDETOMIDINE 1 MCG/KG/HR: 100 INJECTION, SOLUTION, CONCENTRATE INTRAVENOUS at 21:15

## 2019-09-19 RX ADMIN — FUROSEMIDE 80 MG: 10 INJECTION, SOLUTION INTRAVENOUS at 03:51

## 2019-09-19 RX ADMIN — HEPARIN SODIUM 1800 UNITS/HR: 10000 INJECTION, SOLUTION INTRAVENOUS at 19:07

## 2019-09-19 RX ADMIN — PANTOPRAZOLE SODIUM 40 MG: 40 INJECTION, POWDER, FOR SOLUTION INTRAVENOUS at 19:50

## 2019-09-19 RX ADMIN — MICONAZOLE NITRATE: 20 POWDER TOPICAL at 19:50

## 2019-09-19 RX ADMIN — ACETYLCYSTEINE 2 ML: 200 SOLUTION ORAL; RESPIRATORY (INHALATION) at 00:12

## 2019-09-19 RX ADMIN — MIDAZOLAM 8 MG/HR: 5 INJECTION INTRAMUSCULAR; INTRAVENOUS at 14:42

## 2019-09-19 RX ADMIN — DEXMEDETOMIDINE 1 MCG/KG/HR: 100 INJECTION, SOLUTION, CONCENTRATE INTRAVENOUS at 07:40

## 2019-09-19 RX ADMIN — THIAMINE HCL TAB 100 MG 100 MG: 100 TAB at 07:40

## 2019-09-19 RX ADMIN — BIVALIRUDIN 0.15 MG/KG/HR: 250 INJECTION, POWDER, LYOPHILIZED, FOR SOLUTION INTRAVENOUS at 03:49

## 2019-09-19 RX ADMIN — FUROSEMIDE 80 MG: 10 INJECTION, SOLUTION INTRAVENOUS at 21:11

## 2019-09-19 RX ADMIN — FUROSEMIDE 80 MG: 10 INJECTION, SOLUTION INTRAVENOUS at 09:50

## 2019-09-19 RX ADMIN — FUROSEMIDE 80 MG: 10 INJECTION, SOLUTION INTRAVENOUS at 16:01

## 2019-09-19 RX ADMIN — MIDAZOLAM 8 MG/HR: 5 INJECTION INTRAMUSCULAR; INTRAVENOUS at 23:21

## 2019-09-19 RX ADMIN — DEXMEDETOMIDINE 1 MCG/KG/HR: 100 INJECTION, SOLUTION, CONCENTRATE INTRAVENOUS at 02:42

## 2019-09-19 RX ADMIN — PIPERACILLIN SODIUM AND TAZOBACTAM SODIUM 3.38 G: 3; .375 INJECTION, POWDER, LYOPHILIZED, FOR SOLUTION INTRAVENOUS at 05:39

## 2019-09-19 RX ADMIN — IPRATROPIUM BROMIDE AND ALBUTEROL SULFATE 3 ML: .5; 3 SOLUTION RESPIRATORY (INHALATION) at 00:11

## 2019-09-19 RX ADMIN — PIPERACILLIN SODIUM AND TAZOBACTAM SODIUM 3.38 G: 3; .375 INJECTION, POWDER, LYOPHILIZED, FOR SOLUTION INTRAVENOUS at 23:21

## 2019-09-19 RX ADMIN — DEXMEDETOMIDINE 1 MCG/KG/HR: 100 INJECTION, SOLUTION, CONCENTRATE INTRAVENOUS at 15:58

## 2019-09-19 RX ADMIN — IPRATROPIUM BROMIDE AND ALBUTEROL SULFATE 3 ML: .5; 3 SOLUTION RESPIRATORY (INHALATION) at 15:16

## 2019-09-19 RX ADMIN — DEXMEDETOMIDINE 1 MCG/KG/HR: 100 INJECTION, SOLUTION, CONCENTRATE INTRAVENOUS at 18:12

## 2019-09-19 RX ADMIN — PREDNISONE 40 MG: 20 TABLET ORAL at 09:32

## 2019-09-19 RX ADMIN — INSULIN ASPART 1 UNITS: 100 INJECTION, SOLUTION INTRAVENOUS; SUBCUTANEOUS at 19:58

## 2019-09-19 RX ADMIN — MIDAZOLAM 6 MG/HR: 5 INJECTION INTRAMUSCULAR; INTRAVENOUS at 00:38

## 2019-09-19 RX ADMIN — MIDAZOLAM HYDROCHLORIDE 2 MG: 2 INJECTION, SOLUTION INTRAMUSCULAR; INTRAVENOUS at 23:21

## 2019-09-19 RX ADMIN — PIPERACILLIN SODIUM AND TAZOBACTAM SODIUM 3.38 G: 3; .375 INJECTION, POWDER, LYOPHILIZED, FOR SOLUTION INTRAVENOUS at 11:48

## 2019-09-19 RX ADMIN — DEXMEDETOMIDINE 1 MCG/KG/HR: 100 INJECTION, SOLUTION, CONCENTRATE INTRAVENOUS at 05:23

## 2019-09-19 RX ADMIN — DEXMEDETOMIDINE 1 MCG/KG/HR: 100 INJECTION, SOLUTION, CONCENTRATE INTRAVENOUS at 23:21

## 2019-09-19 RX ADMIN — PIPERACILLIN SODIUM AND TAZOBACTAM SODIUM 3.38 G: 3; .375 INJECTION, POWDER, LYOPHILIZED, FOR SOLUTION INTRAVENOUS at 18:12

## 2019-09-19 RX ADMIN — POTASSIUM CHLORIDE 20 MEQ: 29.8 INJECTION, SOLUTION INTRAVENOUS at 00:08

## 2019-09-19 RX ADMIN — INSULIN ASPART 1 UNITS: 100 INJECTION, SOLUTION INTRAVENOUS; SUBCUTANEOUS at 15:59

## 2019-09-19 RX ADMIN — IPRATROPIUM BROMIDE AND ALBUTEROL SULFATE 3 ML: .5; 3 SOLUTION RESPIRATORY (INHALATION) at 12:09

## 2019-09-19 RX ADMIN — DEXMEDETOMIDINE 1 MCG/KG/HR: 100 INJECTION, SOLUTION, CONCENTRATE INTRAVENOUS at 10:30

## 2019-09-19 RX ADMIN — PANTOPRAZOLE SODIUM 40 MG: 40 INJECTION, POWDER, FOR SOLUTION INTRAVENOUS at 07:40

## 2019-09-19 RX ADMIN — POTASSIUM CHLORIDE 20 MEQ: 29.8 INJECTION, SOLUTION INTRAVENOUS at 05:44

## 2019-09-19 RX ADMIN — Medication 100 MCG/HR: at 14:42

## 2019-09-19 RX ADMIN — MICONAZOLE NITRATE: 20 POWDER TOPICAL at 07:41

## 2019-09-19 RX ADMIN — POTASSIUM CHLORIDE 20 MEQ: 1.5 POWDER, FOR SOLUTION ORAL at 11:39

## 2019-09-19 ASSESSMENT — ACTIVITIES OF DAILY LIVING (ADL)
ADLS_ACUITY_SCORE: 21

## 2019-09-19 ASSESSMENT — MIFFLIN-ST. JEOR: SCORE: 2386.5

## 2019-09-19 NOTE — PLAN OF CARE
D/I:      Neuro: CHU slightly but not to request. Opens eyes to sounds but does not track. Pupils 3+ and brisk. Versed Gtt @ 6 mg/hr. Precedex Gtt @ 1 mcg/kg/min. Fentanyl Gtt @ 100 mcg/hr.       Cardiac: SR 80's with no ectopy.  Dobutamine Gtt @ 2.5 mcg/kg/min. Levo Gtt weaned to off @ 0221 keeping MAP > 65.     Resp: Vent CMV FIO2 60%. TV= 450. RR= 12. PEEP= 8.Vent PIP in the 20's this shift. Moderate  amount of thick tan sometimes pink red-streaked  secretions suctioned out of ETT tube this shift. Lungs coarse with diminished bases. CXR shows pulmonary edema improving.   ECMO sweep has been off since 1115 yesterday AM. ECMO speed = 3400 and flow = 3.9  Anticoagulation with Bivalirudin currently @ 0.075 mg/kg/hr.     : Three soft to loose to watery stools this shift. Order obtained and Rectal tube inserted near end of shift. Tube feeding continues @ goal rate of 65 ml/hr. Free water of 75 ml Q 4 hr with today's Na+ level =  139.     GI: Scheduled Lasix  80 mg IV Q 6  hr given. Urine output in french averaged 400 ml/hr this shift.  Potassium levels replaced twice this shift per ordered lytes replacement protocol.      Skin: No new issues. Excoriated areas in deep skin folds under breasts, pannus and groin folds.Miconazole powder and Interdry silver fabric applied.   Intact serous blister in mid lower back.     A/P:     Respiratory status improving with diuresis. Continue to closely monitor and replace potassium per ordered lytes replacement protocol.  Plan for today is ECMO decannulation.

## 2019-09-19 NOTE — PROGRESS NOTES
ECLS Discontinuation Note:    ECLS was discontinued 9/19/2019 at 13:00    Sukumar Manriquez RT, RRT  9/19/2019 1:28 PM

## 2019-09-19 NOTE — PLAN OF CARE
D/I:   Neuro: Intermittently following simple commands to open eyes, wiggle toes. Able to CHU. PERRL. Remains sedated/ pain control on Precedex, Versed and Fentanyl gtts.     CV:/ Resp:  SR 70s-80s. Continues on low dose Dobutamine and Levophed gtts for cardiac support,  maintaining MAP >65.    VV ECLS cannulas removed by MDs in pt room @ 13:00. VSS. Continues to oxygenate well on current vent settings: PC    50%; ; R 12; PEEP 8. Pt bled from RFV site after coughing @ 16:45. MD held pressure x 20 min to achieve hemostasis. Hgb stable. Plts x1 transfused fro Plt level 55.     : Continues to diurese well w/ scheduled lasix 80 mg IV q6h.     GI: Small/ moderate amt loose, liquid brown stool via rectal tube.     A/P: Continue to keep well sedated x 12 hrs s/p hemostasis to prevent coughing . Restart Heparin gtt this pm r/t known PEs

## 2019-09-19 NOTE — PROGRESS NOTES
Cardiology Critical Care Note - Cardiology  Raffaele Stuart M.D.  The patient remains unstable in the ICU with on-going need for ventilator support, parenteral medications for the adjustment of blood pressure and cardiac output and maintenance of renal function.      The patient is seen for prolonged ventilator management requiring oxygen and/or pressure modulation; shock requiring vasopressor and or inotropic agents; Respiratory Failure requiring VV ECMO;  sepsis requiring antibiotic for MSSA pneumonia selection and monitoring, vasopressors, fluid management to maintain blood pressure and secondary organ function    I personally reviewed:  Arterial and venous blood gases to assess acid base balance, oxygenation, and ventilator settings.      Ventilatory settings and/or supplement oxygen needs in order to obtain optimal oxygenation and electrolyte balance at low possible pressure support and inspired oxygen tension    Assess VV decannulation today.

## 2019-09-19 NOTE — PROGRESS NOTES
ECMO Shift Summary:    Patient remains on VV ECMO, all equipment is functioning and alarms are appropriately set. RPM's 3400 with flow range ~4 L/min. Sweep gas has been OFF since 1115 on 19. Circuit remains free of visible air and clot.  Fibrin is appreciated on the internal jugular cannula and at some connectors. Cannulas are secure with no bleeding from site. Extremities are warm to the touch.     Significant Shift Events:   N/a    Vent settings:  CMV 12/450/+8/60%.  BS coarse with expiratory wheezes at times, diminished bases.  Given Duoneb and 2 mls of 20% mucomyst nebs Q4.  Suctioned for moderate pink, tan secretions.    Bivalrudin is running at 0.075 mg/kg/hr.  PTT this AM was 90 so Bivalrudin was held from 3776-9578 and restarted at 50% of previous dose per order.  Will continue to follow PTT.    Urine output is ~350 mls/hr and pale yellow.  Blood loss was not appreciated. Product was not given.      Intake/Output Summary (Last 24 hours) at 2019 0528  Last data filed at 2019 0500  Gross per 24 hour   Intake 5419.73 ml   Output 9420 ml   Net -4000.27 ml       ECHO:  No results found for this or any previous visit.  Results for orders placed in visit on 18   Echocardiogram Complete    Narrative [id number]  [facility]19  [id number]  [id number]^CARRINGTON^VINOD^WILLIS           [facility]  Diagnostic and Treamtent-3rd Floor  [address] [place] [id number]     Name: XAVIER ROWELL  MRN: [id number]  : 1960  Study Date: 2018 05:59 PM  Age: 58 yrs  Gender: Female  Patient Location: [facility] [place]  Reason For Study: Cough, Edema extremities  Ordering Physician: VINOD SHULTZ  Referring Physician: VINOD SHULTZ  Performed By: Ryley Ridley RDCS     BSA: 2.7 m2  Height: 66 in  Weight: 418 lb  HR: 85  BP: 148/77 mmHg  _____________________________________________________________________________  __        Procedure  Echocardiogram with  two-dimensional, color and spectral Doppler performed.  _____________________________________________________________________________  __        Interpretation Summary  Moderate concentric wall thickening consistent with left ventricular  hypertrophy is present.  Biventricular size and systolic function is normal.The LVEF is 55-60%.  No significant valve disease.  Inferior vena cava appers dilated measuring 2.4 cm  _____________________________________________________________________________  __        Left Ventricle  Left ventricular size is normal. Left ventricular function is normal.The EF is  55-60%. Moderate concentric wall thickening consistent with left ventricular  hypertrophy is present. Left ventricular diastolic function is indeterminate.  Regional wall motion is probably normal. Endocardial border definition  suboptimal and contrast not given.     Right Ventricle  The right ventricle is normal size. Global right ventricular function is  normal.     Atria  Both atria appear normal.     Mitral Valve  Mild mitral annular calcification is present. Trace mitral insufficiency is  present.        Aortic Valve  Mild aortic valve sclerosis is present. No aortic regurgitation is present.  Transvalvular Doppler is normal and without signs of signifcant stenosis.     Tricuspid Valve  The peak velocity of the tricuspid regurgitant jet is not obtainable.  Pulmonary artery systolic pressure cannot be assessed.     Pulmonic Valve  The pulmonic valve cannot be assessed.     Vessels  The aorta root is normal. Inferior vena cava appers dilated measuring 2.4 cm.  Ascending aorta 3.3 cm.     Pericardium  No pericardial effusion is present.     _____________________________________________________________________________  __  MMode/2D Measurements & Calculations  IVSd: 1.5 cm  LVIDd: 5.4 cm  LVIDs: 3.7 cm  LVPWd: 1.3 cm  FS: 30.5 %  LV mass(C)d: 323.5 grams  LV mass(C)dI: 118.2 grams/m2     asc Aorta Diam: 3.3 cm  LVOT diam:  2.2 cm  LVOT area: 4.0 cm2  LA Volume (BP): 71.6 ml  LA Volume Index (BP): 26.1 ml/m2  RWT: 0.47        Doppler Measurements & Calculations  MV E max emile: 67.4 cm/sec  MV A max emile: 88.8 cm/sec  MV E/A: 0.76  MV dec time: 0.17 sec  Ao V2 max: 203.2 cm/sec  Ao max P.0 mmHg  MERON(V,D): 2.3 cm2     LV V1 max P.5 mmHg  LV V1 max: 116.8 cm/sec  AV Emile Ratio (DI): 0.57  E/E' av.0  Lateral E/e': 13.4  Medial E/e': 12.6     _____________________________________________________________________________  __           Report approved by: Tariq Pappas 2018 10:50 AM          CXR:  No results found for this or any previous visit (from the past 24 hour(s)).    Labs:  Recent Labs   Lab 19  0327 19  0146 19  2348 19  2132   PH 7.46* 7.47* 7.46* 7.47*   PCO2 49* 48* 48* 47*   PO2 104 96 92 89   HCO3 35* 34* 34* 34*   O2PER 60.0 60.0 60.0 60       Lab Results   Component Value Date    HGB 8.5 (L) 2019    PHGB <30 2019     (L) 2019    FIBR 857 (H) 2019    INR 2.48 (H) 2019    PTT 90 (H) 2019    DD 19.0 (H) 2019    AXA 0.62 2019    ANTCH 75 (L) 2019         Plan is to decannulate today as pt has been off of sweep since 1115 on 19.      Mindi Blake RT, RRT  2019 5:28 AM

## 2019-09-19 NOTE — PROGRESS NOTES
Cardiology Progress Note    Assessment & Plan      59 year old female with a history of hypertension, hyperlipidemia, sleep apnea, morbid obesity, and chronic bronchitis who presents to the emergency department via EMS for evaluation of respiratory distress s/p intubation and PEA arrest with CTA consistent with bilateral PE transferred here for [facility] ECMO thrombectomy and catheter directed lytics on 9/9.    Plan for today  - decannulation  - continue aggressive diuresis  - pulm toileting       Neurology: Intubated, sedated  --continue versed, fentanyl, precedex - hypotensive with propofol   - initial CTH no acute changes, moving when sedation is weaned down and following commands   - decrease sedation once decannulated    Cardiovascular / Hemodynamics: PEA arrest secondary to massive PE.   CTA-PE study demonstrates bilateral distal main and proximal lobar pulmonary emboli, greater on the right with elevated RV/LV ratio. Peripheral V-A ECMO inserted for hemodynamic support for massive PE. Went to cath lab and then to IR for suction mechanical thrombectomy.  LA 8.6   TTE: dilated RV, severely reduced. Turn down 9/16 LVEF 45-55%, RV function mildly reduced and trivial pericardial effusion stable gases.   EKG: sinus tachy  --ACT goal 180-200  - on dobutamine at 2.5 weaned off NE   Pulmonary: PEA arrest secondary to massive PE.   History of heavy smoking   Ventilation Mode: CMV/AC  (Continuous Mandatory Ventilation/ Assist Control)  FiO2 (%): 60 %  Rate Set (breaths/minute): 12 breaths/min  Tidal Volume Set (mL): 450 mL  PEEP (cm H2O): 8 cmH2O  Oxygen Concentration (%): 60 %  Resp: 22  -s/p thrombectomy and catheter directed lytics 9/9-9/10  Chest CT Subpleural groundglass attenuation in the right lung, possibly  pulmonary infarcts given previously seen extensive pulmonary emboli.   Patchy nodularity in the right base suggesting aspiration.  - Now weaning vent requirements. CXR showing white out of left lung- bronched  without mucous plugs, pulmonary edema. CT chest showed L pleural effusion increased R pleural effusion and some concern for tracheomalacia?  CXR: Lines in stable position.   --- scheduled duonebs, steroids in the setting of COPD 40mg d 9/19-**  - on zosyn    GI and Nutrition: No known medical hx.    --monitor BID LFTs  - nutrition consult   --GI Prophylaxis: PPI    Renal, Fluid and Electrolytes: Cr at baseline  UOP continue to monitor.  - volume overloaded will increase diuresis with 80mg q6h  --maintain K>3 and Mg>2    Infectious Disease: Aspiration pneumonia -- sputum culture MSSA  --vancomycin/zosyn x5 days for ECMO, vanc last dose 9/15, will continued on cefazolin, on 9/17 went for VA ECMO decannulation which was complicated by hypercarbic respiratory failure and was therefore transitioned to VV ECMO   - CXR with pulmonary edema got 1.4L of crystalloid in the OR and two units of product. Restarted diureses and broadened back to pip/tazo with repeat cultures.    Hematology and Oncology: Receiving bival for ECMO and  PE.   --cryo PRN fibrinogen < 200; FFP for INR >2  --Transfuse for Hgb<10  - course complicated by DIC with fibrinogen of 82 on 9/10 got 5 unit(s) of cryo  --switch heparin ggt to bival given high heparin requirements, clotting in the venous cannula and fibrin in the circuit despite heparin goal aptt 40-80  --US LE w/ arterial duplex per ECMO protocol   --DVT PPX: bival as above     Endocrinology: No known medical history. BG elevated.  --insulin gtt   Lines: R  Femoral Sept 9 2019 and internal jugular 9/17/19 venous ECMO cannulae   R radial arterial line September 9, 2019  ETT September 9, 2019  Underwood catheter September 9, 2019  OG tube September 9, 2019  Restraint: needed    Current lines are required for patient management       Family update by me today: Yes      Code Status:     The pt was discussed and evaluated with Dr. Andrea Chilel,     Interval History   Sweep off  "since yesterday am   Blood gas stable pH 7.46/O295 CO2 48 HCO3 34  Net negative 3.4L yesterday and 1.9 since yesterday   On bival, blood counts stable     Ventilation Mode: CMV/AC  (Continuous Mandatory Ventilation/ Assist Control)  FiO2 (%): 60 %  Rate Set (breaths/minute): 12 breaths/min  Tidal Volume Set (mL): 450 mL  PEEP (cm H2O): 8 cmH2O  Oxygen Concentration (%): 60 %  Resp: 22        Physical Exam   Temp: 98.2  F (36.8  C) Temp src: Esophageal     Heart Rate: 82 Resp: 22 SpO2: 99 % O2 Device: Mechanical Ventilator    Vitals:    09/17/19 0200 09/18/19 0200 09/19/19 0200   Weight: (!) 182 kg (401 lb 3.8 oz) (!) 180 kg (396 lb 13.3 oz) (!) 179 kg (394 lb 10 oz)     Vital Signs with Ranges  Temp:  [97  F (36.1  C)-99.1  F (37.3  C)] 98.2  F (36.8  C)  Heart Rate:  [] 82  Resp:  [13-30] 22  MAP:  [56 mmHg-114 mmHg] 63 mmHg  Arterial Line BP: ()/(32-62) 92/48  FiO2 (%):  [50 %-60 %] 60 %  SpO2:  [94 %-100 %] 99 %  I/O last 3 completed shifts:  In: 5474.62 [I.V.:3154.62; NG/GT:760]  Out: 9645 [Urine:9455; Emesis/NG output:190]    Heart Rate: 82, Blood pressure 130/68, pulse 86, temperature 98.2  F (36.8  C), temperature source Esophageal, resp. rate 22, height 1.676 m (5' 5.98\"), weight (!) 179 kg (394 lb 10 oz), SpO2 99 %, not currently breastfeeding.  394 lbs 9.97 oz  GEN:  Morbidly obese sedated and intubated  CV:  Distant heart sounds   LUNGS:  Decreased breath sounds  ABD:  Active bowel sounds, soft, non-tender/non-distended.  No rebound/guarding/rigidity.  EXT:  No edema or cyanosis.  ECMO canula in place with only scant oozing   Underwood in place    Medications     Bivalirudin (ANGIOMAX) 250mg/250mL in 0.9% Sodium Chloride 0.075 mg/kg/hr (09/19/19 0800)     dexmedetomidine 1 mcg/kg/hr (09/19/19 0740)     IV fluid REPLACEMENT ONLY       IV fluid REPLACEMENT ONLY       DOBUTamine 2.5 mcg/kg/min (09/19/19 0700)     fentaNYL 100 mcg/hr (09/19/19 0700)     - MEDICATION INSTRUCTIONS -       midazolam 6 " mg/hr (09/19/19 0700)     - MEDICATION INSTRUCTIONS -       norepinephrine 0.02 mcg/kg/min (09/19/19 0700)     - MEDICATION INSTRUCTIONS -         acetylcysteine  2 mL Nebulization Q4H     furosemide  80 mg Intravenous Q6H     insulin aspart  1-6 Units Subcutaneous Q4H     ipratropium - albuterol 0.5 mg/2.5 mg/3 mL  3 mL Nebulization Q4H     miconazole   Topical BID     multivitamin w/minerals  1 tablet Oral or Feeding Tube Daily     pantoprazole (PROTONIX) IV  40 mg Intravenous BID     piperacillin-tazobactam  3.375 g Intravenous Q6H     sodium chloride (PF)  3 mL Intracatheter Q8H     vitamin B1  100 mg Oral or Feeding Tube Daily       Data   Recent Labs   Lab 09/19/19  0806 09/19/19  0337 09/18/19  2348 09/18/19  2136  09/18/19  1557   WBC  --  10.8  --  11.5*  --  11.3*   HGB  --  8.5*  --  8.7*  --  8.4*   MCV  --  93  --  93  --  94   PLT  --  108*  --  114*  --  106*   INR  --  2.48*  --  2.39*  --  2.40*   NA  --  139  --  140  --  140   POTASSIUM 4.0 3.8 4.0 4.1   < > 3.8  3.9   CHLORIDE  --  103  --  103  --  103   CO2  --  35*  --  32  --  29   BUN  --  37*  --  35*  --  34*   CR  --  0.91  --  0.92  --  0.89   ANIONGAP  --  2*  --  5  --  7   MARIA INES  --  9.4  --  9.3  --  8.9   * 126* 132* 132*   < > 138*  140*   ALBUMIN  --  2.2*  --   --   --  2.1*   PROTTOTAL  --  6.2*  --   --   --  6.2*   BILITOTAL  --  0.6  --   --   --  0.7   ALKPHOS  --  92  --   --   --  88   ALT  --  17  --   --   --  17   AST  --  16  --   --   --  15   TROPI  --  <0.015  --  <0.015  --  <0.015    < > = values in this interval not displayed.       Recent Results (from the past 24 hour(s))   XR Chest Port 1 View    Narrative    XR CHEST PORT 1 VW  9/19/2019 1:40 AM      HISTORY: intubated    COMPARISON: Same day chest radiograph    FINDINGS:    Endotracheal tube tip in similar positioning over the midthoracic  trachea. Enteric feeding tubes course beyond the field-of-view. Right  central line and new right IJ ECMO cannula  overlying the SVC/right  atrial junction. A superior approach ECMO cannula over the low right  atrium. Cardiac silhouette is similar in size. Lung volumes are  similar with increased diffuse patchy airspace opacities. Increased  left basilar opacities. Increased bilateral pleural effusions, left  greater than right. No pneumothoraces.      Impression    IMPRESSION:   Increased pulmonary edema with increased moderate left pleural  effusion.    I have personally reviewed the examination and initial interpretation  and I agree with the findings.    LAURA ESCOBEDO MD     I have seen and examined the patient with the [facility] team. I agree with the assessment and plan of the note above.I have reviewed pertinent labs.     Raffaele Stuart MD  Interventional Cardiology  Pager: [phone]

## 2019-09-19 NOTE — PROGRESS NOTES
ECMO Attending Progress Note  9/19/2019    Shira Rodriguez is a 59 year old female who was cannulated for ECMO  due to PEs and respiratory failure on V[facility] ECMO since [facility]    Interval events: improving`    Physical Exam:  Temp:  [97  F (36.1  C)-99.1  F (37.3  C)] 98.2  F (36.8  C)  Heart Rate:  [] 81  Resp:  [17-30] 21  MAP:  [56 mmHg-114 mmHg] 72 mmHg  Arterial Line BP: ()/(32-63) 113/51  FiO2 (%):  [50 %-60 %] 50 %  SpO2:  [94 %-100 %] 99 %    Intake/Output Summary (Last 24 hours) at 9/19/2019 1022  Last data filed at 9/19/2019 0900  Gross per 24 hour   Intake 5071.2 ml   Output 9070 ml   Net -3998.8 ml    Ventilation Mode: CMV/AC  (Continuous Mandatory Ventilation/ Assist Control)  FiO2 (%): (S) 50 %  Rate Set (breaths/minute): 12 breaths/min  Tidal Volume Set (mL): 450 mL  PEEP (cm H2O): 8 cmH2O  Oxygen Concentration (%): (S) 50 %  Resp: 21     General: no acute distress, intubated and sedated  HEENT: PERRLA, conjunctiva clear, without icterus or pallor, oropharyx clear  Cardiac: RRR nl S1S2   Lungs: clear to auscultation and percussion bilaterally anterior  Abdomen: soft, nontender, non distended, no hepatosplenomegaly or masses  Extremities: without edema or cyanosis; pulses are palpable;   Skin: normal skin appearance without worrisome lesions.   Neurologic:intubated and sedated,     Labs:  Recent Labs   Lab 09/19/19  0957 09/19/19  0806 09/19/19  0543 09/19/19  0327   PH 7.47* 7.46* 7.47* 7.46*   PCO2 48* 48* 48* 49*   PO2 82 96 80 104   HCO3 35* 34* 35* 35*   O2PER 50 60.0 60.0 60.0      Recent Labs   Lab 09/19/19  0337 09/18/19  2136 09/18/19  1557 09/18/19  0954   WBC 10.8 11.5* 11.3* 12.3*   HGB 8.5* 8.7* 8.4* 8.4*     Creatinine   Date Value Ref Range Status   09/19/2019 0.91 0.52 - 1.04 mg/dL Final   09/18/2019 0.92 0.52 - 1.04 mg/dL Final   09/18/2019 0.89 0.52 - 1.04 mg/dL Final   09/18/2019 0.94 0.52 - 1.04 mg/dL Final       ECMO Issues including assessments and plan on DOS  9/19/2019:  Neuro: Sedated for mechanical ventilation and ECMO.  NIRS stable  b/l  RASS goal: 0-1  CV: Cardiogenic shock.  Hemodynamically stable   Pulm: Flows unchanged at 3.5, Sweep unchanged at 0, Keep vent settings at rest settings: CMV rate 12, PEEP 8, Vt 450  FEN/Renal: Electrolytes stable w/ replacement protocols in place, Cr stable, UOP stable  Heme: PTT 70-90, Hemoglobin stable .  Goals: if O2 sat >85% Hgb >8.  If O2 Sat <85% keep Hgb 10-12.  Minimal oozing around the ECMO cannulas.  ID: Receiving empiric antibiotics  Cannulae: Position is acceptable on exam and the available imaging.       I have personally reviewed the ECMO flows, oxygenation and CO2 clearance, anticoagulation, and cannula position.  I have also personally assessed the patient's systemic response with hemodynamics, oxygenation, ventilation, and bleeding.       The patient requires continued ECMO support and management in the ICU.  I have discussed patient care and treatment plan with the primary team.      Raffaele Stuart MD  September 19, 2019

## 2019-09-20 ENCOUNTER — APPOINTMENT (OUTPATIENT)
Dept: GENERAL RADIOLOGY | Facility: CLINIC | Age: 59
DRG: 003 | End: 2019-09-20
Payer: COMMERCIAL

## 2019-09-20 LAB
ALBUMIN SERPL-MCNC: 2.3 G/DL (ref 3.4–5)
ALBUMIN SERPL-MCNC: 2.3 G/DL (ref 3.4–5)
ALBUMIN UR-MCNC: 10 MG/DL
ALP SERPL-CCNC: 100 U/L (ref 40–150)
ALP SERPL-CCNC: 106 U/L (ref 40–150)
ALT SERPL W P-5'-P-CCNC: 24 U/L (ref 0–50)
ALT SERPL W P-5'-P-CCNC: 30 U/L (ref 0–50)
ANION GAP SERPL CALCULATED.3IONS-SCNC: 5 MMOL/L (ref 3–14)
ANION GAP SERPL CALCULATED.3IONS-SCNC: 6 MMOL/L (ref 3–14)
ANION GAP SERPL CALCULATED.3IONS-SCNC: 7 MMOL/L (ref 3–14)
ANION GAP SERPL CALCULATED.3IONS-SCNC: 7 MMOL/L (ref 3–14)
APPEARANCE UR: ABNORMAL
APTT PPP: 110 SEC (ref 22–37)
APTT PPP: 45 SEC (ref 22–37)
APTT PPP: 51 SEC (ref 22–37)
APTT PPP: 58 SEC (ref 22–37)
AST SERPL W P-5'-P-CCNC: 19 U/L (ref 0–45)
AST SERPL W P-5'-P-CCNC: 20 U/L (ref 0–45)
AT III ACT/NOR PPP CHRO: 74 % (ref 85–135)
BASE EXCESS BLDA CALC-SCNC: 10 MMOL/L
BASE EXCESS BLDA CALC-SCNC: 10.4 MMOL/L
BASE EXCESS BLDA CALC-SCNC: 11.1 MMOL/L
BASE EXCESS BLDA CALC-SCNC: 11.1 MMOL/L
BASE EXCESS BLDA CALC-SCNC: 11.2 MMOL/L
BASE EXCESS BLDA CALC-SCNC: 11.5 MMOL/L
BASE EXCESS BLDA CALC-SCNC: 11.7 MMOL/L
BASE EXCESS BLDA CALC-SCNC: 11.7 MMOL/L
BASE EXCESS BLDA CALC-SCNC: 8.9 MMOL/L
BASE EXCESS BLDA CALC-SCNC: 9 MMOL/L
BASE EXCESS BLDA CALC-SCNC: 9.7 MMOL/L
BILIRUB SERPL-MCNC: 0.4 MG/DL (ref 0.2–1.3)
BILIRUB SERPL-MCNC: 0.6 MG/DL (ref 0.2–1.3)
BILIRUB UR QL STRIP: NEGATIVE
BUN SERPL-MCNC: 47 MG/DL (ref 7–30)
BUN SERPL-MCNC: 52 MG/DL (ref 7–30)
BUN SERPL-MCNC: 53 MG/DL (ref 7–30)
BUN SERPL-MCNC: 53 MG/DL (ref 7–30)
CA-I BLD-MCNC: 4.8 MG/DL (ref 4.4–5.2)
CA-I BLD-MCNC: 5 MG/DL (ref 4.4–5.2)
CALCIUM SERPL-MCNC: 9.1 MG/DL (ref 8.5–10.1)
CALCIUM SERPL-MCNC: 9.2 MG/DL (ref 8.5–10.1)
CALCIUM SERPL-MCNC: 9.4 MG/DL (ref 8.5–10.1)
CALCIUM SERPL-MCNC: 9.6 MG/DL (ref 8.5–10.1)
CHLORIDE SERPL-SCNC: 101 MMOL/L (ref 94–109)
CHLORIDE SERPL-SCNC: 102 MMOL/L (ref 94–109)
CHLORIDE SERPL-SCNC: 103 MMOL/L (ref 94–109)
CHLORIDE SERPL-SCNC: 104 MMOL/L (ref 94–109)
CO2 SERPL-SCNC: 32 MMOL/L (ref 20–32)
CO2 SERPL-SCNC: 32 MMOL/L (ref 20–32)
CO2 SERPL-SCNC: 33 MMOL/L (ref 20–32)
CO2 SERPL-SCNC: 33 MMOL/L (ref 20–32)
COLOR UR AUTO: YELLOW
CREAT SERPL-MCNC: 0.84 MG/DL (ref 0.52–1.04)
CREAT SERPL-MCNC: 0.99 MG/DL (ref 0.52–1.04)
CREAT SERPL-MCNC: 1.03 MG/DL (ref 0.52–1.04)
CREAT SERPL-MCNC: 1.04 MG/DL (ref 0.52–1.04)
CRP SERPL-MCNC: 140 MG/L (ref 0–8)
ERYTHROCYTE [DISTWIDTH] IN BLOOD BY AUTOMATED COUNT: 15.9 % (ref 10–15)
ERYTHROCYTE [DISTWIDTH] IN BLOOD BY AUTOMATED COUNT: 16 % (ref 10–15)
ERYTHROCYTE [DISTWIDTH] IN BLOOD BY AUTOMATED COUNT: 16 % (ref 10–15)
ERYTHROCYTE [DISTWIDTH] IN BLOOD BY AUTOMATED COUNT: 16.1 % (ref 10–15)
ERYTHROCYTE [SEDIMENTATION RATE] IN BLOOD BY WESTERGREN METHOD: 132 MM/H (ref 0–30)
GFR SERPL CREATININE-BSD FRML MDRD: 59 ML/MIN/{1.73_M2}
GFR SERPL CREATININE-BSD FRML MDRD: 60 ML/MIN/{1.73_M2}
GFR SERPL CREATININE-BSD FRML MDRD: 63 ML/MIN/{1.73_M2}
GFR SERPL CREATININE-BSD FRML MDRD: 76 ML/MIN/{1.73_M2}
GLUCOSE BLD-MCNC: 108 MG/DL (ref 70–99)
GLUCOSE BLD-MCNC: 124 MG/DL (ref 70–99)
GLUCOSE BLD-MCNC: 132 MG/DL (ref 70–99)
GLUCOSE BLD-MCNC: 140 MG/DL (ref 70–99)
GLUCOSE BLD-MCNC: 142 MG/DL (ref 70–99)
GLUCOSE BLD-MCNC: 145 MG/DL (ref 70–99)
GLUCOSE BLDC GLUCOMTR-MCNC: 115 MG/DL (ref 70–99)
GLUCOSE BLDC GLUCOMTR-MCNC: 125 MG/DL (ref 70–99)
GLUCOSE BLDC GLUCOMTR-MCNC: 134 MG/DL (ref 70–99)
GLUCOSE BLDC GLUCOMTR-MCNC: 139 MG/DL (ref 70–99)
GLUCOSE BLDC GLUCOMTR-MCNC: 140 MG/DL (ref 70–99)
GLUCOSE SERPL-MCNC: 104 MG/DL (ref 70–99)
GLUCOSE SERPL-MCNC: 122 MG/DL (ref 70–99)
GLUCOSE SERPL-MCNC: 145 MG/DL (ref 70–99)
GLUCOSE SERPL-MCNC: 147 MG/DL (ref 70–99)
GLUCOSE UR STRIP-MCNC: NEGATIVE MG/DL
GRAM STN SPEC: NORMAL
GRAM STN SPEC: NORMAL
HCO3 BLD-SCNC: 33 MMOL/L (ref 21–28)
HCO3 BLD-SCNC: 34 MMOL/L (ref 21–28)
HCO3 BLD-SCNC: 35 MMOL/L (ref 21–28)
HCO3 BLD-SCNC: 36 MMOL/L (ref 21–28)
HCT VFR BLD AUTO: 27.7 % (ref 35–47)
HCT VFR BLD AUTO: 27.9 % (ref 35–47)
HCT VFR BLD AUTO: 28.5 % (ref 35–47)
HCT VFR BLD AUTO: 28.7 % (ref 35–47)
HGB BLD-MCNC: 8.5 G/DL (ref 11.7–15.7)
HGB BLD-MCNC: 8.6 G/DL (ref 11.7–15.7)
HGB BLD-MCNC: 8.9 G/DL (ref 11.7–15.7)
HGB BLD-MCNC: 8.9 G/DL (ref 11.7–15.7)
HGB FREE PLAS-MCNC: 30 MG/DL
HGB UR QL STRIP: ABNORMAL
INR PPP: 1.22 (ref 0.86–1.14)
INR PPP: 1.24 (ref 0.86–1.14)
INR PPP: 1.26 (ref 0.86–1.14)
INR PPP: 1.29 (ref 0.86–1.14)
KETONES UR STRIP-MCNC: NEGATIVE MG/DL
LACTATE BLD-SCNC: 0.7 MMOL/L (ref 0.7–2)
LACTATE BLD-SCNC: 0.8 MMOL/L (ref 0.7–2)
LACTATE BLD-SCNC: 0.8 MMOL/L (ref 0.7–2)
LACTATE BLD-SCNC: 0.9 MMOL/L (ref 0.7–2)
LDH SERPL L TO P-CCNC: 370 U/L (ref 81–234)
LEUKOCYTE ESTERASE UR QL STRIP: NEGATIVE
LMWH PPP CHRO-ACNC: 0.12 IU/ML
LMWH PPP CHRO-ACNC: 0.16 IU/ML
LMWH PPP CHRO-ACNC: 0.28 IU/ML
LMWH PPP CHRO-ACNC: <0.1 IU/ML
LMWH PPP CHRO-ACNC: <0.1 IU/ML
MAGNESIUM SERPL-MCNC: 2.3 MG/DL (ref 1.6–2.3)
MAGNESIUM SERPL-MCNC: 2.5 MG/DL (ref 1.6–2.3)
MAGNESIUM SERPL-MCNC: 2.7 MG/DL (ref 1.6–2.3)
MAGNESIUM SERPL-MCNC: 2.7 MG/DL (ref 1.6–2.3)
MCH RBC QN AUTO: 28.9 PG (ref 26.5–33)
MCH RBC QN AUTO: 29 PG (ref 26.5–33)
MCH RBC QN AUTO: 29.1 PG (ref 26.5–33)
MCH RBC QN AUTO: 29.2 PG (ref 26.5–33)
MCHC RBC AUTO-ENTMCNC: 30.7 G/DL (ref 31.5–36.5)
MCHC RBC AUTO-ENTMCNC: 30.8 G/DL (ref 31.5–36.5)
MCHC RBC AUTO-ENTMCNC: 31 G/DL (ref 31.5–36.5)
MCHC RBC AUTO-ENTMCNC: 31.2 G/DL (ref 31.5–36.5)
MCV RBC AUTO: 93 FL (ref 78–100)
MCV RBC AUTO: 94 FL (ref 78–100)
NITRATE UR QL: NEGATIVE
O2/TOTAL GAS SETTING VFR VENT: 40 %
O2/TOTAL GAS SETTING VFR VENT: 50 %
OXYHGB MFR BLD: 94 % (ref 92–100)
OXYHGB MFR BLD: 94 % (ref 92–100)
OXYHGB MFR BLD: 95 % (ref 92–100)
OXYHGB MFR BLD: 96 % (ref 92–100)
OXYHGB MFR BLD: 97 % (ref 92–100)
OXYHGB MFR BLD: 97 % (ref 92–100)
OXYHGB MFR BLD: 98 % (ref 92–100)
PCO2 BLD: 42 MM HG (ref 35–45)
PCO2 BLD: 46 MM HG (ref 35–45)
PCO2 BLD: 46 MM HG (ref 35–45)
PCO2 BLD: 47 MM HG (ref 35–45)
PCO2 BLD: 47 MM HG (ref 35–45)
PCO2 BLD: 48 MM HG (ref 35–45)
PCO2 BLD: 50 MM HG (ref 35–45)
PH BLD: 7.46 PH (ref 7.35–7.45)
PH BLD: 7.46 PH (ref 7.35–7.45)
PH BLD: 7.47 PH (ref 7.35–7.45)
PH BLD: 7.48 PH (ref 7.35–7.45)
PH BLD: 7.49 PH (ref 7.35–7.45)
PH BLD: 7.5 PH (ref 7.35–7.45)
PH BLD: 7.51 PH (ref 7.35–7.45)
PH UR STRIP: 6 PH (ref 5–7)
PHOSPHATE SERPL-MCNC: 4.8 MG/DL (ref 2.5–4.5)
PHOSPHATE SERPL-MCNC: 5.2 MG/DL (ref 2.5–4.5)
PLATELET # BLD AUTO: 120 10E9/L (ref 150–450)
PLATELET # BLD AUTO: 159 10E9/L (ref 150–450)
PLATELET # BLD AUTO: 79 10E9/L (ref 150–450)
PLATELET # BLD AUTO: 86 10E9/L (ref 150–450)
PLATELET # BLD AUTO: ABNORMAL 10E9/L (ref 150–450)
PLATELET # BLD AUTO: NORMAL 10E9/L (ref 150–450)
PO2 BLD: 100 MM HG (ref 80–105)
PO2 BLD: 100 MM HG (ref 80–105)
PO2 BLD: 104 MM HG (ref 80–105)
PO2 BLD: 112 MM HG (ref 80–105)
PO2 BLD: 135 MM HG (ref 80–105)
PO2 BLD: 81 MM HG (ref 80–105)
PO2 BLD: 82 MM HG (ref 80–105)
PO2 BLD: 83 MM HG (ref 80–105)
PO2 BLD: 86 MM HG (ref 80–105)
PO2 BLD: 88 MM HG (ref 80–105)
PO2 BLD: 90 MM HG (ref 80–105)
PO2 BLD: 98 MM HG (ref 80–105)
POTASSIUM BLD-SCNC: 3.8 MMOL/L (ref 3.4–5.3)
POTASSIUM BLD-SCNC: 4 MMOL/L (ref 3.4–5.3)
POTASSIUM BLD-SCNC: 4.2 MMOL/L (ref 3.4–5.3)
POTASSIUM BLD-SCNC: 4.2 MMOL/L (ref 3.4–5.3)
POTASSIUM BLD-SCNC: 4.3 MMOL/L (ref 3.4–5.3)
POTASSIUM BLD-SCNC: 4.4 MMOL/L (ref 3.4–5.3)
POTASSIUM SERPL-SCNC: 3.7 MMOL/L (ref 3.4–5.3)
POTASSIUM SERPL-SCNC: 4 MMOL/L (ref 3.4–5.3)
POTASSIUM SERPL-SCNC: 4.1 MMOL/L (ref 3.4–5.3)
POTASSIUM SERPL-SCNC: 4.5 MMOL/L (ref 3.4–5.3)
PROT SERPL-MCNC: 6.7 G/DL (ref 6.8–8.8)
PROT SERPL-MCNC: 7 G/DL (ref 6.8–8.8)
RBC # BLD AUTO: 2.94 10E12/L (ref 3.8–5.2)
RBC # BLD AUTO: 2.96 10E12/L (ref 3.8–5.2)
RBC # BLD AUTO: 3.05 10E12/L (ref 3.8–5.2)
RBC # BLD AUTO: 3.07 10E12/L (ref 3.8–5.2)
RBC #/AREA URNS AUTO: 22 /HPF (ref 0–2)
SODIUM SERPL-SCNC: 141 MMOL/L (ref 133–144)
SODIUM SERPL-SCNC: 141 MMOL/L (ref 133–144)
SODIUM SERPL-SCNC: 142 MMOL/L (ref 133–144)
SODIUM SERPL-SCNC: 142 MMOL/L (ref 133–144)
SOURCE: ABNORMAL
SP GR UR STRIP: 1.02 (ref 1–1.03)
SPECIMEN SOURCE: NORMAL
SQUAMOUS #/AREA URNS AUTO: <1 /HPF (ref 0–1)
TRANS CELLS #/AREA URNS HPF: <1 /HPF (ref 0–1)
TROPONIN I SERPL-MCNC: <0.015 UG/L (ref 0–0.04)
URATE CRY #/AREA URNS HPF: ABNORMAL /HPF
UROBILINOGEN UR STRIP-MCNC: NORMAL MG/DL (ref 0–2)
WBC # BLD AUTO: 14.8 10E9/L (ref 4–11)
WBC # BLD AUTO: 15.4 10E9/L (ref 4–11)
WBC # BLD AUTO: 15.7 10E9/L (ref 4–11)
WBC # BLD AUTO: 16.1 10E9/L (ref 4–11)
WBC #/AREA URNS AUTO: <1 /HPF (ref 0–5)

## 2019-09-20 PROCEDURE — 25000132 ZZH RX MED GY IP 250 OP 250 PS 637: Performed by: STUDENT IN AN ORGANIZED HEALTH CARE EDUCATION/TRAINING PROGRAM

## 2019-09-20 PROCEDURE — 85730 THROMBOPLASTIN TIME PARTIAL: CPT | Performed by: STUDENT IN AN ORGANIZED HEALTH CARE EDUCATION/TRAINING PROGRAM

## 2019-09-20 PROCEDURE — 85520 HEPARIN ASSAY: CPT | Performed by: STUDENT IN AN ORGANIZED HEALTH CARE EDUCATION/TRAINING PROGRAM

## 2019-09-20 PROCEDURE — 25000132 ZZH RX MED GY IP 250 OP 250 PS 637: Performed by: SURGERY

## 2019-09-20 PROCEDURE — 85520 HEPARIN ASSAY: CPT | Performed by: SURGERY

## 2019-09-20 PROCEDURE — 25800030 ZZH RX IP 258 OP 636: Performed by: SURGERY

## 2019-09-20 PROCEDURE — 83051 HEMOGLOBIN PLASMA: CPT | Performed by: SURGERY

## 2019-09-20 PROCEDURE — 71045 X-RAY EXAM CHEST 1 VIEW: CPT

## 2019-09-20 PROCEDURE — 25000125 ZZHC RX 250: Performed by: SURGERY

## 2019-09-20 PROCEDURE — 80053 COMPREHEN METABOLIC PANEL: CPT | Performed by: SURGERY

## 2019-09-20 PROCEDURE — 87077 CULTURE AEROBIC IDENTIFY: CPT | Performed by: STUDENT IN AN ORGANIZED HEALTH CARE EDUCATION/TRAINING PROGRAM

## 2019-09-20 PROCEDURE — 84100 ASSAY OF PHOSPHORUS: CPT | Performed by: STUDENT IN AN ORGANIZED HEALTH CARE EDUCATION/TRAINING PROGRAM

## 2019-09-20 PROCEDURE — 40000275 ZZH STATISTIC RCP TIME EA 10 MIN

## 2019-09-20 PROCEDURE — 84484 ASSAY OF TROPONIN QUANT: CPT | Performed by: STUDENT IN AN ORGANIZED HEALTH CARE EDUCATION/TRAINING PROGRAM

## 2019-09-20 PROCEDURE — 25000128 H RX IP 250 OP 636: Performed by: SURGERY

## 2019-09-20 PROCEDURE — 85730 THROMBOPLASTIN TIME PARTIAL: CPT | Performed by: SURGERY

## 2019-09-20 PROCEDURE — 87040 BLOOD CULTURE FOR BACTERIA: CPT | Performed by: INTERNAL MEDICINE

## 2019-09-20 PROCEDURE — 84132 ASSAY OF SERUM POTASSIUM: CPT | Performed by: STUDENT IN AN ORGANIZED HEALTH CARE EDUCATION/TRAINING PROGRAM

## 2019-09-20 PROCEDURE — 83605 ASSAY OF LACTIC ACID: CPT | Performed by: STUDENT IN AN ORGANIZED HEALTH CARE EDUCATION/TRAINING PROGRAM

## 2019-09-20 PROCEDURE — 99233 SBSQ HOSP IP/OBS HIGH 50: CPT | Mod: GC | Performed by: INTERNAL MEDICINE

## 2019-09-20 PROCEDURE — 80048 BASIC METABOLIC PNL TOTAL CA: CPT | Performed by: STUDENT IN AN ORGANIZED HEALTH CARE EDUCATION/TRAINING PROGRAM

## 2019-09-20 PROCEDURE — 87186 SC STD MICRODIL/AGAR DIL: CPT | Performed by: STUDENT IN AN ORGANIZED HEALTH CARE EDUCATION/TRAINING PROGRAM

## 2019-09-20 PROCEDURE — 36415 COLL VENOUS BLD VENIPUNCTURE: CPT | Performed by: INTERNAL MEDICINE

## 2019-09-20 PROCEDURE — 20000004 ZZH R&B ICU UMMC

## 2019-09-20 PROCEDURE — 81001 URINALYSIS AUTO W/SCOPE: CPT | Performed by: STUDENT IN AN ORGANIZED HEALTH CARE EDUCATION/TRAINING PROGRAM

## 2019-09-20 PROCEDURE — 85610 PROTHROMBIN TIME: CPT | Performed by: STUDENT IN AN ORGANIZED HEALTH CARE EDUCATION/TRAINING PROGRAM

## 2019-09-20 PROCEDURE — 80053 COMPREHEN METABOLIC PANEL: CPT | Performed by: STUDENT IN AN ORGANIZED HEALTH CARE EDUCATION/TRAINING PROGRAM

## 2019-09-20 PROCEDURE — 40000014 ZZH STATISTIC ARTERIAL MONITORING DAILY

## 2019-09-20 PROCEDURE — 25000131 ZZH RX MED GY IP 250 OP 636 PS 637: Performed by: STUDENT IN AN ORGANIZED HEALTH CARE EDUCATION/TRAINING PROGRAM

## 2019-09-20 PROCEDURE — 86140 C-REACTIVE PROTEIN: CPT | Performed by: SURGERY

## 2019-09-20 PROCEDURE — 84100 ASSAY OF PHOSPHORUS: CPT | Performed by: SURGERY

## 2019-09-20 PROCEDURE — 83615 LACTATE (LD) (LDH) ENZYME: CPT | Performed by: SURGERY

## 2019-09-20 PROCEDURE — 85610 PROTHROMBIN TIME: CPT | Performed by: SURGERY

## 2019-09-20 PROCEDURE — 82330 ASSAY OF CALCIUM: CPT | Performed by: STUDENT IN AN ORGANIZED HEALTH CARE EDUCATION/TRAINING PROGRAM

## 2019-09-20 PROCEDURE — C9113 INJ PANTOPRAZOLE SODIUM, VIA: HCPCS | Performed by: SURGERY

## 2019-09-20 PROCEDURE — 85027 COMPLETE CBC AUTOMATED: CPT | Performed by: SURGERY

## 2019-09-20 PROCEDURE — 83735 ASSAY OF MAGNESIUM: CPT | Performed by: SURGERY

## 2019-09-20 PROCEDURE — 27210437 ZZH NUTRITION PRODUCT SEMIELEM INTERMED LITER

## 2019-09-20 PROCEDURE — 83735 ASSAY OF MAGNESIUM: CPT | Performed by: STUDENT IN AN ORGANIZED HEALTH CARE EDUCATION/TRAINING PROGRAM

## 2019-09-20 PROCEDURE — 82805 BLOOD GASES W/O2 SATURATION: CPT | Performed by: STUDENT IN AN ORGANIZED HEALTH CARE EDUCATION/TRAINING PROGRAM

## 2019-09-20 PROCEDURE — 85049 AUTOMATED PLATELET COUNT: CPT

## 2019-09-20 PROCEDURE — 85520 HEPARIN ASSAY: CPT

## 2019-09-20 PROCEDURE — 87205 SMEAR GRAM STAIN: CPT | Performed by: STUDENT IN AN ORGANIZED HEALTH CARE EDUCATION/TRAINING PROGRAM

## 2019-09-20 PROCEDURE — 85652 RBC SED RATE AUTOMATED: CPT | Performed by: SURGERY

## 2019-09-20 PROCEDURE — 84484 ASSAY OF TROPONIN QUANT: CPT | Performed by: SURGERY

## 2019-09-20 PROCEDURE — 94003 VENT MGMT INPAT SUBQ DAY: CPT

## 2019-09-20 PROCEDURE — 25000128 H RX IP 250 OP 636: Performed by: STUDENT IN AN ORGANIZED HEALTH CARE EDUCATION/TRAINING PROGRAM

## 2019-09-20 PROCEDURE — 85027 COMPLETE CBC AUTOMATED: CPT | Performed by: STUDENT IN AN ORGANIZED HEALTH CARE EDUCATION/TRAINING PROGRAM

## 2019-09-20 PROCEDURE — 82947 ASSAY GLUCOSE BLOOD QUANT: CPT | Performed by: STUDENT IN AN ORGANIZED HEALTH CARE EDUCATION/TRAINING PROGRAM

## 2019-09-20 PROCEDURE — 85300 ANTITHROMBIN III ACTIVITY: CPT | Performed by: SURGERY

## 2019-09-20 PROCEDURE — 87070 CULTURE OTHR SPECIMN AEROBIC: CPT | Performed by: STUDENT IN AN ORGANIZED HEALTH CARE EDUCATION/TRAINING PROGRAM

## 2019-09-20 PROCEDURE — 00000146 ZZHCL STATISTIC GLUCOSE BY METER IP

## 2019-09-20 RX ORDER — FUROSEMIDE 10 MG/ML
60 INJECTION INTRAMUSCULAR; INTRAVENOUS EVERY 12 HOURS
Status: DISCONTINUED | OUTPATIENT
Start: 2019-09-20 | End: 2019-09-20

## 2019-09-20 RX ORDER — IPRATROPIUM BROMIDE AND ALBUTEROL SULFATE 2.5; .5 MG/3ML; MG/3ML
3 SOLUTION RESPIRATORY (INHALATION) EVERY 4 HOURS PRN
Status: DISCONTINUED | OUTPATIENT
Start: 2019-09-20 | End: 2019-10-07 | Stop reason: HOSPADM

## 2019-09-20 RX ORDER — ACETAMINOPHEN 325 MG/1
325 TABLET ORAL EVERY 4 HOURS PRN
Status: DISCONTINUED | OUTPATIENT
Start: 2019-09-20 | End: 2019-09-25

## 2019-09-20 RX ORDER — ACETYLCYSTEINE 200 MG/ML
2 SOLUTION ORAL; RESPIRATORY (INHALATION) EVERY 4 HOURS PRN
Status: DISCONTINUED | OUTPATIENT
Start: 2019-09-20 | End: 2019-10-07 | Stop reason: HOSPADM

## 2019-09-20 RX ADMIN — MIDAZOLAM 4 MG/HR: 5 INJECTION INTRAMUSCULAR; INTRAVENOUS at 09:01

## 2019-09-20 RX ADMIN — PIPERACILLIN SODIUM AND TAZOBACTAM SODIUM 3.38 G: 3; .375 INJECTION, POWDER, LYOPHILIZED, FOR SOLUTION INTRAVENOUS at 20:10

## 2019-09-20 RX ADMIN — PANTOPRAZOLE SODIUM 40 MG: 40 INJECTION, POWDER, FOR SOLUTION INTRAVENOUS at 20:10

## 2019-09-20 RX ADMIN — THIAMINE HCL TAB 100 MG 100 MG: 100 TAB at 08:02

## 2019-09-20 RX ADMIN — DEXMEDETOMIDINE 1 MCG/KG/HR: 100 INJECTION, SOLUTION, CONCENTRATE INTRAVENOUS at 02:00

## 2019-09-20 RX ADMIN — MICONAZOLE NITRATE: 20 POWDER TOPICAL at 20:11

## 2019-09-20 RX ADMIN — MICONAZOLE NITRATE: 20 POWDER TOPICAL at 08:02

## 2019-09-20 RX ADMIN — PIPERACILLIN SODIUM AND TAZOBACTAM SODIUM 3.38 G: 3; .375 INJECTION, POWDER, LYOPHILIZED, FOR SOLUTION INTRAVENOUS at 06:12

## 2019-09-20 RX ADMIN — DEXMEDETOMIDINE 0.5 MCG/KG/HR: 100 INJECTION, SOLUTION, CONCENTRATE INTRAVENOUS at 10:29

## 2019-09-20 RX ADMIN — PANTOPRAZOLE SODIUM 40 MG: 40 INJECTION, POWDER, FOR SOLUTION INTRAVENOUS at 08:02

## 2019-09-20 RX ADMIN — MULTIPLE VITAMINS W/ MINERALS TAB 1 TABLET: TAB at 08:02

## 2019-09-20 RX ADMIN — PIPERACILLIN SODIUM AND TAZOBACTAM SODIUM 3.38 G: 3; .375 INJECTION, POWDER, LYOPHILIZED, FOR SOLUTION INTRAVENOUS at 11:18

## 2019-09-20 RX ADMIN — PREDNISONE 40 MG: 20 TABLET ORAL at 08:02

## 2019-09-20 RX ADMIN — DEXMEDETOMIDINE 0.5 MCG/KG/HR: 100 INJECTION, SOLUTION, CONCENTRATE INTRAVENOUS at 13:41

## 2019-09-20 RX ADMIN — Medication 0.02 MCG/KG/MIN: at 11:45

## 2019-09-20 RX ADMIN — ACETAMINOPHEN 325 MG: 325 TABLET, FILM COATED ORAL at 12:00

## 2019-09-20 RX ADMIN — Medication 0.01 MCG/KG/MIN: at 02:52

## 2019-09-20 RX ADMIN — POTASSIUM CHLORIDE 20 MEQ: 1.5 POWDER, FOR SOLUTION ORAL at 09:18

## 2019-09-20 RX ADMIN — INSULIN ASPART 1 UNITS: 100 INJECTION, SOLUTION INTRAVENOUS; SUBCUTANEOUS at 20:44

## 2019-09-20 RX ADMIN — DEXMEDETOMIDINE 0.7 MCG/KG/HR: 100 INJECTION, SOLUTION, CONCENTRATE INTRAVENOUS at 20:44

## 2019-09-20 RX ADMIN — DEXMEDETOMIDINE 0.5 MCG/KG/HR: 100 INJECTION, SOLUTION, CONCENTRATE INTRAVENOUS at 08:29

## 2019-09-20 RX ADMIN — DOBUTAMINE 2.5 MCG/KG/MIN: 12.5 INJECTION, SOLUTION INTRAVENOUS at 01:48

## 2019-09-20 RX ADMIN — Medication 100 MCG/HR: at 08:44

## 2019-09-20 RX ADMIN — HEPARIN SODIUM 2200 UNITS/HR: 10000 INJECTION, SOLUTION INTRAVENOUS at 06:33

## 2019-09-20 RX ADMIN — HEPARIN SODIUM 2500 UNITS/HR: 10000 INJECTION, SOLUTION INTRAVENOUS at 16:51

## 2019-09-20 RX ADMIN — FUROSEMIDE 80 MG: 10 INJECTION, SOLUTION INTRAVENOUS at 03:58

## 2019-09-20 RX ADMIN — MIDAZOLAM 4 MG/HR: 5 INJECTION INTRAMUSCULAR; INTRAVENOUS at 10:28

## 2019-09-20 RX ADMIN — POTASSIUM CHLORIDE 20 MEQ: 29.8 INJECTION, SOLUTION INTRAVENOUS at 06:07

## 2019-09-20 ASSESSMENT — ACTIVITIES OF DAILY LIVING (ADL)
ADLS_ACUITY_SCORE: 19

## 2019-09-20 ASSESSMENT — MIFFLIN-ST. JEOR: SCORE: 2361.5

## 2019-09-20 NOTE — PROGRESS NOTES
Critical Care note    Shira Rodriguez is a 59 year old female who was cannulated for ECMO  due to PEs and respiratory failure on VV ECMO since [facility] dec[facility]    Interval events: decannulated yesterday     Physical Exam:  Temp:  [95.7  F (35.4  C)-100.4  F (38  C)] 99.7  F (37.6  C)  Heart Rate:  [73-98] 77  Resp:  [17-23] 20  MAP:  [62 mmHg-91 mmHg] 66 mmHg  Arterial Line BP: ()/(43-85) 99/49  FiO2 (%):  [50 %-60 %] 50 %  SpO2:  [92 %-99 %] 96 %    Intake/Output Summary (Last 24 hours) at 9/19/2019 1022  Last data filed at 9/19/2019 0900  Gross per 24 hour   Intake 5071.2 ml   Output 9070 ml   Net -3998.8 ml    Ventilation Mode: CMV/AC  (Continuous Mandatory Ventilation/ Assist Control)  FiO2 (%): 50 %  Rate Set (breaths/minute): 12 breaths/min  Tidal Volume Set (mL): 450 mL  PEEP (cm H2O): 8 cmH2O  Oxygen Concentration (%): 50 %  Resp: 20     General: no acute distress, intubated and sedated  HEENT: PERRLA, conjunctiva clear, without icterus or pallor, oropharyx clear  Cardiac: RRR nl S1S2   Lungs: clear to auscultation and percussion bilaterally anterior  Abdomen: soft, nontender, non distended, no hepatosplenomegaly or masses  Extremities: without edema or cyanosis; pulses are palpable;   Skin: normal skin appearance without worrisome lesions.   Neurologic:intubated and sedated,     Labs:  Recent Labs   Lab 09/20/19  0800 09/20/19  0617 09/20/19  0344 09/20/19  0240   PH 7.47* 7.49* 7.49* 7.48*   PCO2 50* 48* 47* 48*   PO2 98 82 86 90   HCO3 36* 36* 36* 36*   O2PER 50.0 50.0 50.0 50.0      Recent Labs   Lab 09/20/19  0344 09/19/19  2237 09/19/19  1931 09/19/19  1642 09/19/19  1549   WBC 15.7* 17.0* 16.7*  --  13.1*   HGB 8.9* 9.2* 8.8* 9.4* 9.2*     Creatinine   Date Value Ref Range Status   09/20/2019 1.03 0.52 - 1.04 mg/dL Final   09/19/2019 0.95 0.52 - 1.04 mg/dL Final   09/19/2019 0.93 0.52 - 1.04 mg/dL Final   09/19/2019 0.86 0.52 - 1.04 mg/dL Final     Plan to awake and extubate over the next  24hrous or 2 days.       Raffaele Stuart MD  September 20 2019

## 2019-09-20 NOTE — PLAN OF CARE
D: PEA Arrest; PE's  I/A: Neuro: Pupils equal/reactive. Able to move toes to command. Withdrawing upper extremities when suctioned (more left than right). Sedated with Precedex/Versed/Fentanyl.  Resp: Vented; see ABGs  Card: SR; MAP >65. Levo gtt stopped. Dobutamine gtt infusing @2.5mcg/kg/min  GI: Tube feeds at goal; Rectal tube in place  : Diuresing with 80mg IV lasix Q6hrs with good response. See I/O. Underwood in place.  Skin: Multiple areas of excoriation under skin folds; interdry in place.   Afebrile

## 2019-09-21 ENCOUNTER — APPOINTMENT (OUTPATIENT)
Dept: GENERAL RADIOLOGY | Facility: CLINIC | Age: 59
DRG: 003 | End: 2019-09-21
Payer: COMMERCIAL

## 2019-09-21 ENCOUNTER — APPOINTMENT (OUTPATIENT)
Dept: PHYSICAL THERAPY | Facility: CLINIC | Age: 59
DRG: 003 | End: 2019-09-21
Attending: STUDENT IN AN ORGANIZED HEALTH CARE EDUCATION/TRAINING PROGRAM
Payer: COMMERCIAL

## 2019-09-21 ENCOUNTER — APPOINTMENT (OUTPATIENT)
Dept: OCCUPATIONAL THERAPY | Facility: CLINIC | Age: 59
DRG: 003 | End: 2019-09-21
Attending: STUDENT IN AN ORGANIZED HEALTH CARE EDUCATION/TRAINING PROGRAM
Payer: COMMERCIAL

## 2019-09-21 ENCOUNTER — APPOINTMENT (OUTPATIENT)
Dept: CT IMAGING | Facility: CLINIC | Age: 59
DRG: 003 | End: 2019-09-21
Attending: INTERNAL MEDICINE
Payer: COMMERCIAL

## 2019-09-21 LAB
ABO + RH BLD: NORMAL
ABO + RH BLD: NORMAL
ALBUMIN SERPL-MCNC: 2.4 G/DL (ref 3.4–5)
ALP SERPL-CCNC: 90 U/L (ref 40–150)
ALT SERPL W P-5'-P-CCNC: 31 U/L (ref 0–50)
ANION GAP SERPL CALCULATED.3IONS-SCNC: 6 MMOL/L (ref 3–14)
APTT PPP: 49 SEC (ref 22–37)
AST SERPL W P-5'-P-CCNC: 19 U/L (ref 0–45)
AT III ACT/NOR PPP CHRO: 70 % (ref 85–135)
BASE EXCESS BLDA CALC-SCNC: 11.4 MMOL/L
BASE EXCESS BLDA CALC-SCNC: 7.2 MMOL/L
BASE EXCESS BLDA CALC-SCNC: 7.5 MMOL/L
BASE EXCESS BLDA CALC-SCNC: 9.4 MMOL/L
BILIRUB SERPL-MCNC: 0.4 MG/DL (ref 0.2–1.3)
BLD GP AB SCN SERPL QL: NORMAL
BLOOD BANK CMNT PATIENT-IMP: NORMAL
BUN SERPL-MCNC: 50 MG/DL (ref 7–30)
CA-I BLD-MCNC: 5 MG/DL (ref 4.4–5.2)
CALCIUM SERPL-MCNC: 9.2 MG/DL (ref 8.5–10.1)
CHLORIDE SERPL-SCNC: 106 MMOL/L (ref 94–109)
CO2 SERPL-SCNC: 35 MMOL/L (ref 20–32)
CREAT SERPL-MCNC: 0.91 MG/DL (ref 0.52–1.04)
CRP SERPL-MCNC: 75 MG/L (ref 0–8)
ERYTHROCYTE [DISTWIDTH] IN BLOOD BY AUTOMATED COUNT: 15.8 % (ref 10–15)
ERYTHROCYTE [SEDIMENTATION RATE] IN BLOOD BY WESTERGREN METHOD: 120 MM/H (ref 0–30)
GFR SERPL CREATININE-BSD FRML MDRD: 70 ML/MIN/{1.73_M2}
GLUCOSE BLD-MCNC: 122 MG/DL (ref 70–99)
GLUCOSE BLD-MCNC: 130 MG/DL (ref 70–99)
GLUCOSE BLD-MCNC: 131 MG/DL (ref 70–99)
GLUCOSE BLD-MCNC: 150 MG/DL (ref 70–99)
GLUCOSE BLDC GLUCOMTR-MCNC: 116 MG/DL (ref 70–99)
GLUCOSE BLDC GLUCOMTR-MCNC: 116 MG/DL (ref 70–99)
GLUCOSE BLDC GLUCOMTR-MCNC: 122 MG/DL (ref 70–99)
GLUCOSE BLDC GLUCOMTR-MCNC: 123 MG/DL (ref 70–99)
GLUCOSE BLDC GLUCOMTR-MCNC: 123 MG/DL (ref 70–99)
GLUCOSE BLDC GLUCOMTR-MCNC: 140 MG/DL (ref 70–99)
GLUCOSE SERPL-MCNC: 125 MG/DL (ref 70–99)
HCO3 BLD-SCNC: 32 MMOL/L (ref 21–28)
HCO3 BLD-SCNC: 33 MMOL/L (ref 21–28)
HCO3 BLD-SCNC: 34 MMOL/L (ref 21–28)
HCO3 BLD-SCNC: 36 MMOL/L (ref 21–28)
HCT VFR BLD AUTO: 27.8 % (ref 35–47)
HGB BLD-MCNC: 8.5 G/DL (ref 11.7–15.7)
HGB FREE PLAS-MCNC: 30 MG/DL
INR PPP: 1.2 (ref 0.86–1.14)
INR PPP: 1.27 (ref 0.86–1.14)
INR PPP: 1.27 (ref 0.86–1.14)
LACTATE BLD-SCNC: 0.6 MMOL/L (ref 0.7–2)
LDH SERPL L TO P-CCNC: 312 U/L (ref 81–234)
LMWH PPP CHRO-ACNC: 0.11 IU/ML
LMWH PPP CHRO-ACNC: 0.38 IU/ML
MAGNESIUM SERPL-MCNC: 2.7 MG/DL (ref 1.6–2.3)
MAGNESIUM SERPL-MCNC: 2.9 MG/DL (ref 1.6–2.3)
MAGNESIUM SERPL-MCNC: 2.9 MG/DL (ref 1.6–2.3)
MCH RBC QN AUTO: 29 PG (ref 26.5–33)
MCHC RBC AUTO-ENTMCNC: 30.6 G/DL (ref 31.5–36.5)
MCV RBC AUTO: 95 FL (ref 78–100)
O2/TOTAL GAS SETTING VFR VENT: 50 %
OXYHGB MFR BLD: 96 % (ref 92–100)
OXYHGB MFR BLD: 97 % (ref 92–100)
OXYHGB MFR BLD: 98 % (ref 92–100)
OXYHGB MFR BLD: 98 % (ref 92–100)
PCO2 BLD: 45 MM HG (ref 35–45)
PCO2 BLD: 47 MM HG (ref 35–45)
PCO2 BLD: 49 MM HG (ref 35–45)
PCO2 BLD: 49 MM HG (ref 35–45)
PH BLD: 7.44 PH (ref 7.35–7.45)
PH BLD: 7.44 PH (ref 7.35–7.45)
PH BLD: 7.48 PH (ref 7.35–7.45)
PH BLD: 7.49 PH (ref 7.35–7.45)
PHOSPHATE SERPL-MCNC: 3.4 MG/DL (ref 2.5–4.5)
PLATELET # BLD AUTO: 93 10E9/L (ref 150–450)
PO2 BLD: 105 MM HG (ref 80–105)
PO2 BLD: 117 MM HG (ref 80–105)
PO2 BLD: 127 MM HG (ref 80–105)
PO2 BLD: 88 MM HG (ref 80–105)
POTASSIUM BLD-SCNC: 3.8 MMOL/L (ref 3.4–5.3)
POTASSIUM SERPL-SCNC: 3.7 MMOL/L (ref 3.4–5.3)
PROT SERPL-MCNC: 6.6 G/DL (ref 6.8–8.8)
RBC # BLD AUTO: 2.93 10E12/L (ref 3.8–5.2)
SODIUM SERPL-SCNC: 147 MMOL/L (ref 133–144)
SPECIMEN EXP DATE BLD: NORMAL
TROPONIN I SERPL-MCNC: <0.015 UG/L (ref 0–0.04)
WBC # BLD AUTO: 13.8 10E9/L (ref 4–11)

## 2019-09-21 PROCEDURE — 25000132 ZZH RX MED GY IP 250 OP 250 PS 637: Performed by: SURGERY

## 2019-09-21 PROCEDURE — 84132 ASSAY OF SERUM POTASSIUM: CPT | Performed by: STUDENT IN AN ORGANIZED HEALTH CARE EDUCATION/TRAINING PROGRAM

## 2019-09-21 PROCEDURE — 86901 BLOOD TYPING SEROLOGIC RH(D): CPT | Performed by: INTERNAL MEDICINE

## 2019-09-21 PROCEDURE — 25000128 H RX IP 250 OP 636: Performed by: STUDENT IN AN ORGANIZED HEALTH CARE EDUCATION/TRAINING PROGRAM

## 2019-09-21 PROCEDURE — 97165 OT EVAL LOW COMPLEX 30 MIN: CPT | Mod: GO | Performed by: OCCUPATIONAL THERAPIST

## 2019-09-21 PROCEDURE — 83735 ASSAY OF MAGNESIUM: CPT | Performed by: STUDENT IN AN ORGANIZED HEALTH CARE EDUCATION/TRAINING PROGRAM

## 2019-09-21 PROCEDURE — 84100 ASSAY OF PHOSPHORUS: CPT | Performed by: SURGERY

## 2019-09-21 PROCEDURE — 82330 ASSAY OF CALCIUM: CPT | Performed by: STUDENT IN AN ORGANIZED HEALTH CARE EDUCATION/TRAINING PROGRAM

## 2019-09-21 PROCEDURE — 94003 VENT MGMT INPAT SUBQ DAY: CPT

## 2019-09-21 PROCEDURE — 97530 THERAPEUTIC ACTIVITIES: CPT | Mod: GP | Performed by: REHABILITATION PRACTITIONER

## 2019-09-21 PROCEDURE — 25000125 ZZHC RX 250: Performed by: SURGERY

## 2019-09-21 PROCEDURE — 25000128 H RX IP 250 OP 636: Performed by: SURGERY

## 2019-09-21 PROCEDURE — 20000004 ZZH R&B ICU UMMC

## 2019-09-21 PROCEDURE — 80053 COMPREHEN METABOLIC PANEL: CPT | Performed by: SURGERY

## 2019-09-21 PROCEDURE — 97162 PT EVAL MOD COMPLEX 30 MIN: CPT | Mod: GP | Performed by: REHABILITATION PRACTITIONER

## 2019-09-21 PROCEDURE — 86140 C-REACTIVE PROTEIN: CPT | Performed by: SURGERY

## 2019-09-21 PROCEDURE — 25800030 ZZH RX IP 258 OP 636: Performed by: SURGERY

## 2019-09-21 PROCEDURE — 40000281 ZZH STATISTIC TRANSPORT TIME EA 15 MIN

## 2019-09-21 PROCEDURE — 85730 THROMBOPLASTIN TIME PARTIAL: CPT | Performed by: SURGERY

## 2019-09-21 PROCEDURE — 00000146 ZZHCL STATISTIC GLUCOSE BY METER IP

## 2019-09-21 PROCEDURE — C9113 INJ PANTOPRAZOLE SODIUM, VIA: HCPCS | Performed by: SURGERY

## 2019-09-21 PROCEDURE — 82947 ASSAY GLUCOSE BLOOD QUANT: CPT | Performed by: INTERNAL MEDICINE

## 2019-09-21 PROCEDURE — 86850 RBC ANTIBODY SCREEN: CPT | Performed by: INTERNAL MEDICINE

## 2019-09-21 PROCEDURE — 84484 ASSAY OF TROPONIN QUANT: CPT | Performed by: SURGERY

## 2019-09-21 PROCEDURE — 85027 COMPLETE CBC AUTOMATED: CPT | Performed by: SURGERY

## 2019-09-21 PROCEDURE — 71045 X-RAY EXAM CHEST 1 VIEW: CPT

## 2019-09-21 PROCEDURE — 86900 BLOOD TYPING SEROLOGIC ABO: CPT | Performed by: INTERNAL MEDICINE

## 2019-09-21 PROCEDURE — 71275 CT ANGIOGRAPHY CHEST: CPT

## 2019-09-21 PROCEDURE — 25800030 ZZH RX IP 258 OP 636: Performed by: STUDENT IN AN ORGANIZED HEALTH CARE EDUCATION/TRAINING PROGRAM

## 2019-09-21 PROCEDURE — 82805 BLOOD GASES W/O2 SATURATION: CPT | Performed by: INTERNAL MEDICINE

## 2019-09-21 PROCEDURE — 40000556 ZZH STATISTIC PERIPHERAL IV START W US GUIDANCE

## 2019-09-21 PROCEDURE — 40000275 ZZH STATISTIC RCP TIME EA 10 MIN

## 2019-09-21 PROCEDURE — 83615 LACTATE (LD) (LDH) ENZYME: CPT | Performed by: SURGERY

## 2019-09-21 PROCEDURE — 97110 THERAPEUTIC EXERCISES: CPT | Mod: GP | Performed by: REHABILITATION PRACTITIONER

## 2019-09-21 PROCEDURE — 85520 HEPARIN ASSAY: CPT | Performed by: SURGERY

## 2019-09-21 PROCEDURE — 85652 RBC SED RATE AUTOMATED: CPT | Performed by: SURGERY

## 2019-09-21 PROCEDURE — 85300 ANTITHROMBIN III ACTIVITY: CPT | Performed by: SURGERY

## 2019-09-21 PROCEDURE — 85610 PROTHROMBIN TIME: CPT | Performed by: SURGERY

## 2019-09-21 PROCEDURE — 83735 ASSAY OF MAGNESIUM: CPT | Performed by: SURGERY

## 2019-09-21 PROCEDURE — 25000128 H RX IP 250 OP 636: Performed by: INTERNAL MEDICINE

## 2019-09-21 PROCEDURE — 85520 HEPARIN ASSAY: CPT | Performed by: STUDENT IN AN ORGANIZED HEALTH CARE EDUCATION/TRAINING PROGRAM

## 2019-09-21 PROCEDURE — 97110 THERAPEUTIC EXERCISES: CPT | Mod: GO | Performed by: OCCUPATIONAL THERAPIST

## 2019-09-21 PROCEDURE — 83605 ASSAY OF LACTIC ACID: CPT | Performed by: STUDENT IN AN ORGANIZED HEALTH CARE EDUCATION/TRAINING PROGRAM

## 2019-09-21 PROCEDURE — 82805 BLOOD GASES W/O2 SATURATION: CPT | Performed by: STUDENT IN AN ORGANIZED HEALTH CARE EDUCATION/TRAINING PROGRAM

## 2019-09-21 PROCEDURE — 97530 THERAPEUTIC ACTIVITIES: CPT | Mod: GO | Performed by: OCCUPATIONAL THERAPIST

## 2019-09-21 PROCEDURE — 82947 ASSAY GLUCOSE BLOOD QUANT: CPT | Performed by: STUDENT IN AN ORGANIZED HEALTH CARE EDUCATION/TRAINING PROGRAM

## 2019-09-21 PROCEDURE — 99291 CRITICAL CARE FIRST HOUR: CPT | Mod: GC | Performed by: INTERNAL MEDICINE

## 2019-09-21 PROCEDURE — 85610 PROTHROMBIN TIME: CPT | Performed by: STUDENT IN AN ORGANIZED HEALTH CARE EDUCATION/TRAINING PROGRAM

## 2019-09-21 PROCEDURE — 83051 HEMOGLOBIN PLASMA: CPT | Performed by: SURGERY

## 2019-09-21 PROCEDURE — 25000131 ZZH RX MED GY IP 250 OP 636 PS 637: Performed by: STUDENT IN AN ORGANIZED HEALTH CARE EDUCATION/TRAINING PROGRAM

## 2019-09-21 PROCEDURE — 40000014 ZZH STATISTIC ARTERIAL MONITORING DAILY

## 2019-09-21 PROCEDURE — 27210437 ZZH NUTRITION PRODUCT SEMIELEM INTERMED LITER

## 2019-09-21 RX ORDER — IOPAMIDOL 755 MG/ML
77 INJECTION, SOLUTION INTRAVASCULAR ONCE
Status: COMPLETED | OUTPATIENT
Start: 2019-09-21 | End: 2019-09-21

## 2019-09-21 RX ORDER — FUROSEMIDE 10 MG/ML
60 INJECTION INTRAMUSCULAR; INTRAVENOUS DAILY
Status: DISCONTINUED | OUTPATIENT
Start: 2019-09-21 | End: 2019-09-25

## 2019-09-21 RX ADMIN — PIPERACILLIN SODIUM AND TAZOBACTAM SODIUM 3.38 G: 3; .375 INJECTION, POWDER, LYOPHILIZED, FOR SOLUTION INTRAVENOUS at 08:24

## 2019-09-21 RX ADMIN — DEXMEDETOMIDINE 1.2 MCG/KG/HR: 100 INJECTION, SOLUTION, CONCENTRATE INTRAVENOUS at 20:25

## 2019-09-21 RX ADMIN — DEXMEDETOMIDINE 1 MCG/KG/HR: 100 INJECTION, SOLUTION, CONCENTRATE INTRAVENOUS at 08:29

## 2019-09-21 RX ADMIN — DEXMEDETOMIDINE 1 MCG/KG/HR: 100 INJECTION, SOLUTION, CONCENTRATE INTRAVENOUS at 05:56

## 2019-09-21 RX ADMIN — MIDAZOLAM 4 MG/HR: 5 INJECTION INTRAMUSCULAR; INTRAVENOUS at 16:13

## 2019-09-21 RX ADMIN — PANTOPRAZOLE SODIUM 40 MG: 40 INJECTION, POWDER, FOR SOLUTION INTRAVENOUS at 08:24

## 2019-09-21 RX ADMIN — POTASSIUM CHLORIDE 20 MEQ: 29.8 INJECTION, SOLUTION INTRAVENOUS at 04:18

## 2019-09-21 RX ADMIN — THIAMINE HCL TAB 100 MG 100 MG: 100 TAB at 08:23

## 2019-09-21 RX ADMIN — MICONAZOLE NITRATE: 20 POWDER TOPICAL at 08:25

## 2019-09-21 RX ADMIN — DEXMEDETOMIDINE 1.2 MCG/KG/HR: 100 INJECTION, SOLUTION, CONCENTRATE INTRAVENOUS at 22:06

## 2019-09-21 RX ADMIN — DOBUTAMINE 2.5 MCG/KG/MIN: 12.5 INJECTION, SOLUTION INTRAVENOUS at 16:13

## 2019-09-21 RX ADMIN — Medication 100 MCG/HR: at 08:00

## 2019-09-21 RX ADMIN — DEXMEDETOMIDINE 1 MCG/KG/HR: 100 INJECTION, SOLUTION, CONCENTRATE INTRAVENOUS at 10:35

## 2019-09-21 RX ADMIN — MULTIPLE VITAMINS W/ MINERALS TAB 1 TABLET: TAB at 08:23

## 2019-09-21 RX ADMIN — HEPARIN SODIUM 2650 UNITS/HR: 10000 INJECTION, SOLUTION INTRAVENOUS at 01:42

## 2019-09-21 RX ADMIN — DEXMEDETOMIDINE 1 MCG/KG/HR: 100 INJECTION, SOLUTION, CONCENTRATE INTRAVENOUS at 15:32

## 2019-09-21 RX ADMIN — PIPERACILLIN SODIUM AND TAZOBACTAM SODIUM 3.38 G: 3; .375 INJECTION, POWDER, LYOPHILIZED, FOR SOLUTION INTRAVENOUS at 20:14

## 2019-09-21 RX ADMIN — FUROSEMIDE 60 MG: 10 INJECTION, SOLUTION INTRAVENOUS at 18:39

## 2019-09-21 RX ADMIN — PANTOPRAZOLE SODIUM 40 MG: 40 INJECTION, POWDER, FOR SOLUTION INTRAVENOUS at 20:14

## 2019-09-21 RX ADMIN — IOPAMIDOL 77 ML: 755 INJECTION, SOLUTION INTRAVENOUS at 14:43

## 2019-09-21 RX ADMIN — PIPERACILLIN SODIUM AND TAZOBACTAM SODIUM 3.38 G: 3; .375 INJECTION, POWDER, LYOPHILIZED, FOR SOLUTION INTRAVENOUS at 13:57

## 2019-09-21 RX ADMIN — HEPARIN SODIUM 2950 UNITS/HR: 10000 INJECTION, SOLUTION INTRAVENOUS at 18:38

## 2019-09-21 RX ADMIN — DEXMEDETOMIDINE 1 MCG/KG/HR: 100 INJECTION, SOLUTION, CONCENTRATE INTRAVENOUS at 03:22

## 2019-09-21 RX ADMIN — DEXMEDETOMIDINE 0.8 MCG/KG/HR: 100 INJECTION, SOLUTION, CONCENTRATE INTRAVENOUS at 00:25

## 2019-09-21 RX ADMIN — MICONAZOLE NITRATE: 20 POWDER TOPICAL at 20:14

## 2019-09-21 RX ADMIN — PREDNISONE 40 MG: 20 TABLET ORAL at 08:23

## 2019-09-21 RX ADMIN — PIPERACILLIN SODIUM AND TAZOBACTAM SODIUM 3.38 G: 3; .375 INJECTION, POWDER, LYOPHILIZED, FOR SOLUTION INTRAVENOUS at 01:54

## 2019-09-21 ASSESSMENT — ACTIVITIES OF DAILY LIVING (ADL)
ADLS_ACUITY_SCORE: 19
ADLS_ACUITY_SCORE: 18
IADL_COMMENTS: OT: PT WAS IND
ADLS_ACUITY_SCORE: 18
ADLS_ACUITY_SCORE: 19
ADLS_ACUITY_SCORE: 18
ADLS_ACUITY_SCORE: 19

## 2019-09-21 ASSESSMENT — MIFFLIN-ST. JEOR: SCORE: 2356.5

## 2019-09-21 NOTE — PROGRESS NOTES
09/21/19 1400   Quick Adds   Type of Visit Initial Occupational Therapy Evaluation   Living Environment   Lives With alone   Living Arrangements apartment   Home Accessibility no concerns   Living Environment Comment brother in law Ramirez reports pt's mother, also named Shira Rodriguez, often stays with pt for extended times and could provide 24/7 physical A for pt upon return home if needed   Self-Care   Usual Activity Tolerance moderate   Current Activity Tolerance poor   Regular Exercise No   Equipment Currently Used at Home none   Activity/Exercise/Self-Care Comment James reports pt could walk 100 yards out to her car   Functional Level   Ambulation 0-->independent   Transferring 0-->independent   Toileting 0-->independent   Bathing 0-->independent   Dressing 0-->independent   Eating 0-->independent   Communication 0-->understands/communicates without difficulty   Swallowing 0-->swallows foods/liquids without difficulty   Cognition 0 - no cognition issues reported   Fall history within last six months no   Which of the above functional risks had a recent onset or change? transferring;ambulation   Prior Functional Level Comment Sleeps in recliner, Brother in law Ramirez reports at baseline pt avoids stairs, has difficulty (attributes to pain) for sit <> stand and getting in and out of a chair.  He says she has been quite sedentary since stopped working ~ 6 months ago   General Information   Onset of Illness/Injury or Date of Surgery - Date 09/09/19   Referring Physician Elvira Ward MD   Patient/Family Goals Statement not stated, pt intubated, not oriented fully   Additional Occupational Profile Info/Pertinent History of Current Problem 59 year old female with a history of hypertension, hyperlipidemia, sleep apnea, morbid obesity, and chronic bronchitis who presents to the emergency department via EMS for evaluation of respiratory distress s/p intubation and PEA arrest with CTA consistent with  bilateral PE transferred here for [facility] thromb[facility] and catheter directed lytics on 9/9, went for [facility] ECM[facility] decannulation on 9/17 complicated by hypercarbic respiratory failure s/p VV ECMO through RIJ-FV bypass s/p decannulation on 9/19.   Precautions/Limitations fall precautions   Cognitive Status Examination   Cognitive Comment OT: Pt shakes head no when asked if she knows where she is, pt follows 100% of commands w/ VC   Visual Perception   Visual Perception Wears glasses   Range of Motion (ROM)   ROM Comment OT: LAURIE COHEN WFL   Strength   Strength Comments OT: AROM shoulder flexion 0-90 abducted at 90 degrees 3+/5 , elbow flexion 4/5, no pronation/supination 0/5   Hand Strength   Hand Strength Comments OT: bilateral  4/5   Coordination   Coordination Comments OT: LAURIE limited 2/2 limited strength   Mobility   Bed Mobility Comments OT: max A x2   Transfer Skills   Transfer Comments OT: total A x3   Balance   Balance Comments OT: mod A x1-2 sitting w/ back unsupported edge of chair   Instrumental Activities of Daily Living (IADL)   IADL Comments OT: Pt was ind   Activities of Daily Living Analysis   Impairments Contributing to Impaired Activities of Daily Living balance impaired;cognition impaired;strength decreased;ROM decreased   General Therapy Interventions   Planned Therapy Interventions ADL retraining;IADL retraining;balance training;bed mobility training;cognition;strengthening;transfer training;home program guidelines;progressive activity/exercise   Clinical Impression   Criteria for Skilled Therapeutic Interventions Met yes, treatment indicated   OT Diagnosis decreased ind in ADLS/IADLS   Influenced by the following impairments generalized weakness and cognition   Assessment of Occupational Performance 1-3 Performance Deficits   Identified Performance Deficits decreased ind in ADLS/IADLS   Clinical Decision Making (Complexity) Low complexity   Therapy Frequency 5x/week   Predicted Duration of Therapy  "Intervention (days/wks) 10/12/19   Anticipated Discharge Disposition Transitional Care Facility   Risks and Benefits of Treatment have been explained. Yes   Patient, Family & other staff in agreement with plan of care Yes   [facility] TM \"6 Clicks\"   2016, Trustees of [facility], under license to [employer].  All rights reserved.   6 Clicks Short Forms Daily Activity Inpatient Short Form   [facility]  \"6 Clicks\" Daily Activity Inpatient Short Form   1. Putting on and taking off regular lower body clothing? 1 - Total   2. Bathing (including washing, rinsing, drying)? 1 - Total   3. Toileting, which includes using toilet, bedpan or urinal? 1 - Total   4. Putting on and taking off regular upper body clothing? 1 - Total   5. Taking care of personal grooming such as brushing teeth? 1 - Total   6. Eating meals? 1 - Total   Daily Activity Raw Score (Score out of 24.Lower scores equate to lower levels of function) 6   Total Evaluation Time   Total Evaluation Time (Minutes) 3     "

## 2019-09-21 NOTE — PLAN OF CARE
Neuro - Tmax 99.7F, Follows commands, On fentanyl, precedex, Versed gtt. Pt awake most of night, uncomfortable on vent. Pupils equal/reactive.  Cardiac - SR , K replaced, MAPs greater than 65. Dobutamine gtt at 2.5. Heparin gtt at 2950, next 10A at 1100.  Respiratory - Vent: CMV, 50% Fio2, PEEP 8, RR 12, . Coarse lung sounds, frequent suctioning, Red-streaked secretions.  GI - TF at goal 65 through [place], Free water flush 75ml/4hr. Rectal tube.   - Underwood with good UOP.  Family - Sister at bedside.   Plan - Continue to monitor and notify MD of questions or concerns.

## 2019-09-21 NOTE — PLAN OF CARE
"/53   Pulse 95   Temp 99.5  F (37.5  C) (Axillary)   Resp 15   Ht 1.676 m (5' 5.98\")   Wt (!) 176 kg (388 lb 0.2 oz)   SpO2 100%   BMI 62.66 kg/m       Neuro:  Follows commands. Wiggles toes, able to squeeze writer's fingers. PERRL. On precedex, versed and fentanyl (See MAR)     Cardiac: Febrile in low 100s. Pending cx.-SR in 80s. CVP:13.      Respiratory:  Vent FIO2:50, R:12,PEEP:8, TV:500. Red streaked secretions with suction. PS for 2hrs this am, was able to maintain resp btwn 23-28 and TV 400s while PS. (team updated with ABGs)     GI/: TF @65, 75mls q4 flushes. Rectal tube with small loose stool.      Skin: Redness in groin site, interdry in place.Bruising on LE with meplex in place.      LDA: OG to LIS               Rectal tube .              R. I.J. fo access.               R. Radial line                 R and L. PIV.     Changes this shift: CT chest done this afternoon. Echo to be done. Lasix 60mg adm x1. Up in chair this am for about 1hr and half. Will continue to monitor and update team with changes.      "

## 2019-09-21 NOTE — PROGRESS NOTES
09/21/19 1056   Quick Adds   Type of Visit Initial PT Evaluation   Living Environment   Lives With alone   Living Arrangements apartment   Home Accessibility no concerns   Living Environment Comment brother in law Ramirez reports pt's mother, also named Shira Rodriguez, often stays with pt for extended times and could provide 24/7 physical A for pt upon return home if needed   Self-Care   Usual Activity Tolerance moderate   Current Activity Tolerance poor   Regular Exercise No   Equipment Currently Used at Home none   Activity/Exercise/Self-Care Comment James reports pt could walk 100 yards out to her car   Functional Level Prior   Ambulation 0-->independent   Transferring 0-->independent   Toileting 0-->independent   Bathing 0-->independent   Communication 0-->understands/communicates without difficulty   Swallowing 0-->swallows foods/liquids without difficulty   Cognition 0 - no cognition issues reported   Fall history within last six months no   Which of the above functional risks had a recent onset or change? transferring;ambulation   Prior Functional Level Comment Sleeps in recliner, Brother in law Ramirez reports at baseline pt avoids stairs, has difficulty (attributes to pain) for sit <> stand and getting in and out of a chair.  He says she has been quite sedentary since stopped working ~ 6 months ago   General Information   Onset of Illness/Injury or Date of Surgery - Date 09/09/19   Referring Physician Sharan Mcguire MD   Patient/Family Goals Statement return home.   Pertinent History of Current Problem (include personal factors and/or comorbidities that impact the POC) 59 year old female with a history of hypertension, hyperlipidemia, sleep apnea, morbid obesity, and chronic bronchitis who presents to the emergency department via EMS for evaluation of respiratory distress s/p intubation and PEA arrest with CTA consistent with bilateral PE transferred here for [facility] ECMO thrombectomy and catheter  directed lytics on 9/9, went for [facility] ECMO decannulation on 9/17 complicated by hypercarbic respiratory failure s/p VV ECMO through RIJ-FV bypass s/p decannulation on 9/19.    Precautions/Limitations fall precautions;oxygen therapy device and L/min   Cognitive Status Examination   Orientation person   Level of Consciousness alert   Cognitive Comment Nods yes that she goes by Shira, nods she's at her apartment when given options & doesn't respond to list of months or seasons   Pain Assessment   Patient Currently in Pain No  (but finds tubes uncomfortable)   Integumentary/Edema   Integumentary/Edema Comments no pitting edema noted in hands/feet, skin not taut/shiny   Posture    Posture Forward head position;Protracted shoulders   Range of Motion (ROM)   ROM Comment soft tissue approximation limits hip and knee flexion, but able to flex to at least 90 degrees passively   Strength   Strength Comments Not following commands for MMT, other than at least 3/5 ankle DF and knee extension,   Bed Mobility   Bed Mobility Comments total A of lift sheet, ceiling lift and Ax2 for rolling   Transfer Skills   Transfer Comments bed to recliner with total A of ceiling lift/ultra sling & Ax2, (A of 3rd to move bed/recliner during transfer)   Gait   Gait Comments not safe to initiate   Balance   Balance Comments Dependent for seated trunk stability currently   Sensory Examination   Sensory Perception Comments YONI   Muscle Tone   Muscle Tone no deficits were identified   Muscle Tone Comments BLE   General Therapy Interventions   Planned Therapy Interventions balance training;bed mobility training;gait training;neuromuscular re-education;ROM;strengthening;stretching;transfer training;wheelchair management/propulsion training;risk factor education;home program guidelines;progressive activity/exercise   Clinical Impression   Criteria for Skilled Therapeutic Intervention yes, treatment indicated   PT Diagnosis impaired functional mobility  "  Influenced by the following impairments decreased strength, balance, endurance, posture, respiration/ventillation, cognition   Functional limitations due to impairments Total A of lift and Ax2-3 for bed mobility and up to recliner   Clinical Presentation Evolving/Changing   Clinical Presentation Rationale PMH and clinical judgment   Clinical Decision Making (Complexity) Moderate complexity   Therapy Frequency 6x/week   Predicted Duration of Therapy Intervention (days/wks) 3 wks   Anticipated Discharge Disposition Transitional Care Facility   Risk & Benefits of therapy have been explained Yes   Patient, Family & other staff in agreement with plan of care Yes   [facility] AM-[facility] TM \"6 Clicks\"   2016, Trustees of [facility], under license to [employer].  All rights reserved.   6 Clicks Short Forms Basic Mobility Inpatient Short Form   [facility] AM-PAC  \"6 Clicks\" V.2 Basic Mobility Inpatient Short Form   1. Turning from your back to your side while in a flat bed without using bedrails? 1 - Total   2. Moving from lying on your back to sitting on the side of a flat bed without using bedrails? 1 - Total   3. Moving to and from a bed to a chair (including a wheelchair)? 1 - Total   4. Standing up from a chair using your arms (e.g., wheelchair, or bedside chair)? 1 - Total   5. To walk in hospital room? 1 - Total   6. Climbing 3-5 steps with a railing? 1 - Total   Basic Mobility Raw Score (Score out of 24.Lower scores equate to lower levels of function) 6   Total Evaluation Time   Total Evaluation Time (Minutes) 7     "

## 2019-09-21 NOTE — PROGRESS NOTES
Cardiology Progress Note    Assessment & Plan      59 year old female with a history of hypertension, hyperlipidemia, sleep apnea, morbid obesity, and chronic bronchitis who presents to the emergency department via EMS for evaluation of respiratory distress s/p intubation and PEA arrest with CTA consistent with bilateral PE transferred here for [facility] ECMO thrombectomy and catheter directed lytics on 9/9, went for [facility] ECMO decannulation on 9/17 complicated by hypercarbic respiratory failure s/p VV ECMO through RIJ-FV bypass s/p decannulation on 9/19.    Plan for today  - chest CT with contrast  - consider remove pleural effusion  - wean sedation to work towards extubation  - await reculture for low fevers  - PT/PT      Neurology: Intubated, sedated  --continue versed, fentanyl, precedex - hypotensive with propofol   - initial CTH no acute changes, moving when sedation is weaned down and following commands   - weaning sedation post decannulation to extubate   Cardiovascular / Hemodynamics: PEA arrest secondary to massive PE.   CTA-PE study demonstrates bilateral distal main and proximal lobar pulmonary emboli, greater on the right with elevated RV/LV ratio. Peripheral V-A ECMO inserted for hemodynamic support for massive PE. Went to cath lab and then to IR for suction mechanical thrombectomy.  LA 8.6 then normalized.  TTE: dilated RV, severely reduced. Turn down 9/16 LVEF 45-55%, RV function mildly reduced and trivial pericardial effusion stable oxygenation.   EKG: sinus tachy  --heparin gtt  - on dobutamine at 2.5   Pulmonary: PEA arrest secondary to massive PE.   History of heavy smoking   Ventilation Mode: CMV/AC  (Continuous Mandatory Ventilation/ Assist Control)  FiO2 (%): 50 %  Rate Set (breaths/minute): 12 breaths/min  Tidal Volume Set (mL): 500 mL  PEEP (cm H2O): 8 cmH2O  Pressure Support (cm H2O): (S) 10 cmH2O  Oxygen Concentration (%): 50 %  Resp: 18  -s/p thrombectomy and catheter directed lytics  9/9-9/10  Chest CT Subpleural groundglass attenuation in the right lung, possibly pulmonary infarcts given previously seen extensive pulmonary emboli.   Patchy nodularity in the right base suggesting aspiration.  - Now weaning vent requirements. CXR showing white out of left lung- bronched without mucous plugs, pulmonary edema. CT chest showed L pleural effusion increased R pleural effusion and some concern for tracheomalacia?  CXR: Lines in stable position.   - prednisone 40/d X5 d(9/19-23) for COPD exacerbation in the setting of pneumonia   --- scheduled duonebs    GI and Nutrition: No known medical hx.    - nutrition consult   --GI Prophylaxis: PPI    Renal, Fluid and Electrolytes: - volume overloaded. Held diuresis with 80mg q6h    Infectious Disease: Aspiration pneumonia -- sputum culture MSSA  --vancomycin/zosyn x5 days for ECMO, vanc last dose 9/15, cefazolin, on 9/17 went for [facility] ECMO decannulation which was complicated by hypercarbic respiratory failure and was therefore transitioned to VV ECMO  - CXR with pulmonary edema got 1.4L of crystalloid in the OR and two units of product. Restarted diureses and broadened back to pip/tazo with repeat cultures.   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 Likely from consumption   Endocrinology: No known medical history. BG elevated.  --insulin gtt   Lines: R radial arterial line September 9, 2019  RIJ Sept 9 2019  ETT September 9, 2019  Underwood catheter September 9, 2019  OG tube September 9, 2019  Restraint: needed    Current lines are required for patient management       Family update by me today: Yes      Code Status:     The pt was discussed and evaluated with Dr. Tk Olivarez MD cardiology fellow    Interval History     Pressure support with tachyonia yesterday.  On heparin ggt  Started steroid burst for COPD  "9/19-    Ventilation Mode: CMV/AC  (Continuous Mandatory Ventilation/ Assist Control)  FiO2 (%): 50 %  Rate Set (breaths/minute): 12 breaths/min  Tidal Volume Set (mL): 500 mL  PEEP (cm H2O): 8 cmH2O  Pressure Support (cm H2O): (S) 10 cmH2O  Oxygen Concentration (%): 50 %  Resp: 18        Physical Exam   Temp: 99.5  F (37.5  C) Temp src: Esophageal BP: 102/53 Pulse: 95 Heart Rate: 76 Resp: 18 SpO2: 98 % O2 Device: Mechanical Ventilator    Vitals:    09/19/19 0200 09/20/19 0530 09/21/19 0400   Weight: (!) 179 kg (394 lb 10 oz) (!) 176.5 kg (389 lb 1.8 oz) (!) 176 kg (388 lb 0.2 oz)     Vital Signs with Ranges  Temp:  [97.5  F (36.4  C)-100.2  F (37.9  C)] 99.5  F (37.5  C)  Pulse:  [95] 95  Heart Rate:  [] 76  Resp:  [16-28] 18  BP: (102)/(53) 102/53  MAP:  [64 mmHg-89 mmHg] 78 mmHg  Arterial Line BP: ()/(51-64) 121/54  FiO2 (%):  [40 %-50 %] 50 %  SpO2:  [96 %-99 %] 98 %  I/O last 3 completed shifts:  In: 4057.53 [I.V.:2117.53; NG/GT:445]  Out: 3345 [Urine:3000; Emesis/NG output:120; Stool:225]    Heart Rate: 76, Blood pressure 102/53, pulse 95, temperature 99.5  F (37.5  C), resp. rate 18, height 1.676 m (5' 5.98\"), weight (!) 176 kg (388 lb 0.2 oz), SpO2 98 %, not currently breastfeeding.  388 lbs .15 oz  GEN:  Morbidly obese sedated and intubated  CV:  Distant heart sounds   LUNGS:  Decreased breath sounds, coarse crackles  ABD:  Active bowel sounds, soft, non-tender/non-distended.  No rebound/guarding/rigidity.  EXT:  No edema or cyanosis.  ECMO decannulation sites clean withouot active bleeding   Underwood in place    Medications     dexmedetomidine 1 mcg/kg/hr (09/21/19 0700)     IV fluid REPLACEMENT ONLY       IV fluid REPLACEMENT ONLY       DOBUTamine 2.5 mcg/kg/min (09/21/19 0700)     fentaNYL 100 mcg/hr (09/21/19 0800)     HEParin 2,950 Units/hr (09/21/19 0700)     - MEDICATION INSTRUCTIONS -       midazolam 2 mg/hr (09/21/19 0700)     - MEDICATION INSTRUCTIONS -       norepinephrine Stopped " (09/20/19 1200)     - MEDICATION INSTRUCTIONS -         insulin aspart  1-6 Units Subcutaneous Q4H     miconazole   Topical BID     multivitamin w/minerals  1 tablet Oral or Feeding Tube Daily     pantoprazole (PROTONIX) IV  40 mg Intravenous BID     piperacillin-tazobactam  3.375 g Intravenous Q6H     predniSONE  40 mg Oral Daily     sodium chloride (PF)  3 mL Intracatheter Q8H     vitamin B1  100 mg Oral or Feeding Tube Daily       Data   Recent Labs   Lab 09/21/19  0324 09/21/19  0323 09/20/19  2032 09/20/19  1551   WBC 13.8*  --  15.4* 14.8*   HGB 8.5*  --  8.6* 8.5*   MCV 95  --  94 94   PLT 93*  --  79* 86*   INR 1.20*  --  1.29* 1.24*   *  --  142 141   POTASSIUM 3.7 3.8 4.1  4.3 4.5  4.4   CHLORIDE 106  --  104 103   CO2 35*  --  33* 32   BUN 50*  --  52* 53*   CR 0.91  --  0.84 0.99   ANIONGAP 6  --  5 7   MARIA INES 9.2  --  9.1 9.4   * 122* 145*  145* 147*  142*   ALBUMIN 2.4*  --   --  2.3*   PROTTOTAL 6.6*  --   --  7.0   BILITOTAL 0.4  --   --  0.4   ALKPHOS 90  --   --  100   ALT 31  --   --  30   AST 19  --   --  19   TROPI <0.015  --  <0.015 <0.015       No results found for this or any previous visit (from the past 24 hour(s)).       Critical Care Services Progress Note:     Shira Rodriguez remains critically ill with PE     I personally examined and evaluated the patient today.   The patient s prognosis today is tentative  I have evaluated all laboratory values and imaging studies from the past 24 hours.  Key findings and decisions made today included   - chest CT with contrast  - consider remove pleural effusion  - wean sedation to work towards extubation  - await reculture for low fevers  - PT/PT    I personally managed the ventilator, sedation, pain control and analgesia, metabolic abnormalities, antibiotic therapy, nutritional status and vasoactive medications.   Consults ongoing and ordered are none  Procedures that will happen today are: none  All treatments were placed at my  direction.  I formulated today s plan with the house staff team or resident(s) and agree with the findings and plan in the associated note.       The above plans and care have been discussed with family and all questions and concerns were addressed.     I spent a total of 45 minutes (excluding procedure time) personally providing and directing critical care services at the bedside and on the critical care unit for Shira Castillolivan.        Richard Frey MD

## 2019-09-21 NOTE — PLAN OF CARE
"  /53   Pulse 95   Temp 98.8  F (37.1  C)   Resp 18   Ht 1.676 m (5' 5.98\")   Wt (!) 176.5 kg (389 lb 1.8 oz)   SpO2 98%   BMI 62.83 kg/m       Neuro:  Follows commands. Wiggles toes, able to squeeze writer's fingers. PERRL     Cardiac: Febrile in low 100s. Blood cx , sputum cx , Urine cx sent.SR-ST in 80s. Tachy in low 100s with suction and repositioning.     Respiratory:  Vent FIO2:50, R:12,PEEP:8, TV:500. Red streaked secretions with suction. PS for 1hr (team updated with ABGs)    GI/: TF @65, 75mls q4 flushes.     Skin: Redness in groin site, interdry in place.     LDA: OG to LIS    Rectal tube .   R. I.J. fo access.    R. Radial line    R and L. PIV.        Plan: Weaned off versed. On precedex and fentanyl.  Fall risk precautions.       "

## 2019-09-21 NOTE — PLAN OF CARE
PT 4E: Evaluation completed and treatment initiated.   Discharge Planner PT   Patient plan for discharge: nods head in response to TCU, brother in law James accepting of rec.  Current status: Total A of lift and A of 2-3 for rolling and bed to recliner using ultrasling/ceiling lift.  Attempted fwd trunk lean and fwd reaching in recliner, but pt unable to lean fwd.  Barriers to return to prior living situation: weakness, fatigue, command following  Recommendations for discharge: TCU  Rationale for recommendations: to progress transfers and gait.         Entered by: Jennifer Miles 09/21/2019 11:57 AM

## 2019-09-21 NOTE — PLAN OF CARE
Discharge Planner OT   Patient plan for discharge: not stated  Current status: OT: AROM shoulder flexion 0-90 abducted at 90 degrees 3+/5 , elbow flexion 4/5, no pronation/supination 0/5, pt able to sit edge of chair w/ mod A VSS throughout CMV 50% PS 8.   Barriers to return to prior living situation: medical status  Recommendations for discharge: TCU  Rationale for recommendations: to increase ind in ADLS/IADLS       Entered by: Lady Joe 09/21/2019 2:36 PM

## 2019-09-22 ENCOUNTER — APPOINTMENT (OUTPATIENT)
Dept: GENERAL RADIOLOGY | Facility: CLINIC | Age: 59
DRG: 003 | End: 2019-09-22
Payer: COMMERCIAL

## 2019-09-22 ENCOUNTER — APPOINTMENT (OUTPATIENT)
Dept: CARDIOLOGY | Facility: CLINIC | Age: 59
DRG: 003 | End: 2019-09-22
Attending: INTERNAL MEDICINE
Payer: COMMERCIAL

## 2019-09-22 ENCOUNTER — APPOINTMENT (OUTPATIENT)
Dept: OCCUPATIONAL THERAPY | Facility: CLINIC | Age: 59
DRG: 003 | End: 2019-09-22
Payer: COMMERCIAL

## 2019-09-22 ENCOUNTER — APPOINTMENT (OUTPATIENT)
Dept: PHYSICAL THERAPY | Facility: CLINIC | Age: 59
DRG: 003 | End: 2019-09-22
Payer: COMMERCIAL

## 2019-09-22 LAB
ALBUMIN SERPL-MCNC: 2.4 G/DL (ref 3.4–5)
ALP SERPL-CCNC: 83 U/L (ref 40–150)
ALT SERPL W P-5'-P-CCNC: 30 U/L (ref 0–50)
ANION GAP SERPL CALCULATED.3IONS-SCNC: 4 MMOL/L (ref 3–14)
APTT PPP: 113 SEC (ref 22–37)
AST SERPL W P-5'-P-CCNC: 13 U/L (ref 0–45)
AT III ACT/NOR PPP CHRO: 69 % (ref 85–135)
BASE EXCESS BLDA CALC-SCNC: 7.7 MMOL/L
BASE EXCESS BLDA CALC-SCNC: 8.3 MMOL/L
BASE EXCESS BLDA CALC-SCNC: 8.3 MMOL/L
BASE EXCESS BLDA CALC-SCNC: 9.2 MMOL/L
BASE EXCESS BLDA CALC-SCNC: 9.3 MMOL/L
BASE EXCESS BLDA CALC-SCNC: 9.3 MMOL/L
BILIRUB SERPL-MCNC: 0.3 MG/DL (ref 0.2–1.3)
BLD PROD TYP BPU: NORMAL
BLD UNIT ID BPU: 0
BLOOD PRODUCT CODE: NORMAL
BPU ID: NORMAL
BUN SERPL-MCNC: 45 MG/DL (ref 7–30)
CALCIUM SERPL-MCNC: 9.4 MG/DL (ref 8.5–10.1)
CHLORIDE SERPL-SCNC: 110 MMOL/L (ref 94–109)
CO2 SERPL-SCNC: 32 MMOL/L (ref 20–32)
CREAT SERPL-MCNC: 0.88 MG/DL (ref 0.52–1.04)
ERYTHROCYTE [DISTWIDTH] IN BLOOD BY AUTOMATED COUNT: 15.7 % (ref 10–15)
GFR SERPL CREATININE-BSD FRML MDRD: 72 ML/MIN/{1.73_M2}
GLUCOSE BLD-MCNC: 121 MG/DL (ref 70–99)
GLUCOSE BLD-MCNC: 126 MG/DL (ref 70–99)
GLUCOSE BLD-MCNC: 131 MG/DL (ref 70–99)
GLUCOSE BLD-MCNC: 142 MG/DL (ref 70–99)
GLUCOSE BLD-MCNC: 143 MG/DL (ref 70–99)
GLUCOSE BLD-MCNC: 91 MG/DL (ref 70–99)
GLUCOSE BLDC GLUCOMTR-MCNC: 131 MG/DL (ref 70–99)
GLUCOSE BLDC GLUCOMTR-MCNC: 135 MG/DL (ref 70–99)
GLUCOSE BLDC GLUCOMTR-MCNC: 78 MG/DL (ref 70–99)
GLUCOSE SERPL-MCNC: 123 MG/DL (ref 70–99)
HCO3 BLD-SCNC: 33 MMOL/L (ref 21–28)
HCO3 BLD-SCNC: 34 MMOL/L (ref 21–28)
HCT VFR BLD AUTO: 28.1 % (ref 35–47)
HGB BLD-MCNC: 8.5 G/DL (ref 11.7–15.7)
INR PPP: 1.26 (ref 0.86–1.14)
INR PPP: 1.26 (ref 0.86–1.14)
INR PPP: 1.29 (ref 0.86–1.14)
INR PPP: 1.34 (ref 0.86–1.14)
LACTATE BLD-SCNC: 0.6 MMOL/L (ref 0.7–2)
LACTATE BLD-SCNC: 0.7 MMOL/L (ref 0.7–2)
LDH SERPL L TO P-CCNC: 285 U/L (ref 81–234)
LMWH PPP CHRO-ACNC: 0.65 IU/ML
MAGNESIUM SERPL-MCNC: 2.8 MG/DL (ref 1.6–2.3)
MAGNESIUM SERPL-MCNC: 2.9 MG/DL (ref 1.6–2.3)
MCH RBC QN AUTO: 28.9 PG (ref 26.5–33)
MCHC RBC AUTO-ENTMCNC: 30.2 G/DL (ref 31.5–36.5)
MCV RBC AUTO: 96 FL (ref 78–100)
O2/TOTAL GAS SETTING VFR VENT: 30 %
O2/TOTAL GAS SETTING VFR VENT: 50 %
O2/TOTAL GAS SETTING VFR VENT: ABNORMAL %
OXYHGB MFR BLD: 93 % (ref 92–100)
OXYHGB MFR BLD: 94 % (ref 92–100)
OXYHGB MFR BLD: 96 % (ref 92–100)
OXYHGB MFR BLD: 98 % (ref 92–100)
PCO2 BLD: 46 MM HG (ref 35–45)
PCO2 BLD: 47 MM HG (ref 35–45)
PCO2 BLD: 48 MM HG (ref 35–45)
PCO2 BLD: 49 MM HG (ref 35–45)
PH BLD: 7.44 PH (ref 7.35–7.45)
PH BLD: 7.45 PH (ref 7.35–7.45)
PH BLD: 7.46 PH (ref 7.35–7.45)
PH BLD: 7.46 PH (ref 7.35–7.45)
PH BLD: 7.47 PH (ref 7.35–7.45)
PH BLD: 7.48 PH (ref 7.35–7.45)
PHOSPHATE SERPL-MCNC: 3.9 MG/DL (ref 2.5–4.5)
PLATELET # BLD AUTO: 85 10E9/L (ref 150–450)
PO2 BLD: 117 MM HG (ref 80–105)
PO2 BLD: 118 MM HG (ref 80–105)
PO2 BLD: 120 MM HG (ref 80–105)
PO2 BLD: 71 MM HG (ref 80–105)
PO2 BLD: 73 MM HG (ref 80–105)
PO2 BLD: 94 MM HG (ref 80–105)
POTASSIUM BLD-SCNC: 4.1 MMOL/L (ref 3.4–5.3)
POTASSIUM SERPL-SCNC: 3.9 MMOL/L (ref 3.4–5.3)
POTASSIUM SERPL-SCNC: 4 MMOL/L (ref 3.4–5.3)
PROCALCITONIN SERPL-MCNC: 0.17 NG/ML
PROT SERPL-MCNC: 6.7 G/DL (ref 6.8–8.8)
RAD FLAG-ADDENDUM: ABNORMAL
RBC # BLD AUTO: 2.94 10E12/L (ref 3.8–5.2)
SODIUM SERPL-SCNC: 147 MMOL/L (ref 133–144)
TRANSFUSION STATUS PATIENT QL: NORMAL
TRANSFUSION STATUS PATIENT QL: NORMAL
WBC # BLD AUTO: 12.2 10E9/L (ref 4–11)

## 2019-09-22 PROCEDURE — 25800030 ZZH RX IP 258 OP 636: Performed by: SURGERY

## 2019-09-22 PROCEDURE — 93325 DOPPLER ECHO COLOR FLOW MAPG: CPT | Mod: 26 | Performed by: INTERNAL MEDICINE

## 2019-09-22 PROCEDURE — 84145 PROCALCITONIN (PCT): CPT | Performed by: INTERNAL MEDICINE

## 2019-09-22 PROCEDURE — 85730 THROMBOPLASTIN TIME PARTIAL: CPT | Performed by: INTERNAL MEDICINE

## 2019-09-22 PROCEDURE — 83735 ASSAY OF MAGNESIUM: CPT | Performed by: STUDENT IN AN ORGANIZED HEALTH CARE EDUCATION/TRAINING PROGRAM

## 2019-09-22 PROCEDURE — C9113 INJ PANTOPRAZOLE SODIUM, VIA: HCPCS | Performed by: SURGERY

## 2019-09-22 PROCEDURE — 40000264 ECHOCARDIOGRAM LIMITED

## 2019-09-22 PROCEDURE — 94003 VENT MGMT INPAT SUBQ DAY: CPT

## 2019-09-22 PROCEDURE — 83615 LACTATE (LD) (LDH) ENZYME: CPT | Performed by: INTERNAL MEDICINE

## 2019-09-22 PROCEDURE — 82805 BLOOD GASES W/O2 SATURATION: CPT | Performed by: STUDENT IN AN ORGANIZED HEALTH CARE EDUCATION/TRAINING PROGRAM

## 2019-09-22 PROCEDURE — 71045 X-RAY EXAM CHEST 1 VIEW: CPT

## 2019-09-22 PROCEDURE — 82805 BLOOD GASES W/O2 SATURATION: CPT

## 2019-09-22 PROCEDURE — 93321 DOPPLER ECHO F-UP/LMTD STD: CPT | Mod: 26 | Performed by: INTERNAL MEDICINE

## 2019-09-22 PROCEDURE — 84132 ASSAY OF SERUM POTASSIUM: CPT | Performed by: INTERNAL MEDICINE

## 2019-09-22 PROCEDURE — 40000275 ZZH STATISTIC RCP TIME EA 10 MIN

## 2019-09-22 PROCEDURE — 25000128 H RX IP 250 OP 636: Performed by: STUDENT IN AN ORGANIZED HEALTH CARE EDUCATION/TRAINING PROGRAM

## 2019-09-22 PROCEDURE — 97530 THERAPEUTIC ACTIVITIES: CPT | Mod: GP | Performed by: REHABILITATION PRACTITIONER

## 2019-09-22 PROCEDURE — 85520 HEPARIN ASSAY: CPT | Performed by: INTERNAL MEDICINE

## 2019-09-22 PROCEDURE — 82947 ASSAY GLUCOSE BLOOD QUANT: CPT

## 2019-09-22 PROCEDURE — 84100 ASSAY OF PHOSPHORUS: CPT | Performed by: STUDENT IN AN ORGANIZED HEALTH CARE EDUCATION/TRAINING PROGRAM

## 2019-09-22 PROCEDURE — 82947 ASSAY GLUCOSE BLOOD QUANT: CPT | Performed by: STUDENT IN AN ORGANIZED HEALTH CARE EDUCATION/TRAINING PROGRAM

## 2019-09-22 PROCEDURE — 84132 ASSAY OF SERUM POTASSIUM: CPT | Performed by: STUDENT IN AN ORGANIZED HEALTH CARE EDUCATION/TRAINING PROGRAM

## 2019-09-22 PROCEDURE — 20000004 ZZH R&B ICU UMMC

## 2019-09-22 PROCEDURE — 97110 THERAPEUTIC EXERCISES: CPT | Mod: GP | Performed by: REHABILITATION PRACTITIONER

## 2019-09-22 PROCEDURE — 97535 SELF CARE MNGMENT TRAINING: CPT | Mod: GO | Performed by: OCCUPATIONAL THERAPIST

## 2019-09-22 PROCEDURE — 85300 ANTITHROMBIN III ACTIVITY: CPT | Performed by: INTERNAL MEDICINE

## 2019-09-22 PROCEDURE — 25500064 ZZH RX 255 OP 636: Performed by: INTERNAL MEDICINE

## 2019-09-22 PROCEDURE — 27210437 ZZH NUTRITION PRODUCT SEMIELEM INTERMED LITER

## 2019-09-22 PROCEDURE — 25000128 H RX IP 250 OP 636: Performed by: SURGERY

## 2019-09-22 PROCEDURE — 00000146 ZZHCL STATISTIC GLUCOSE BY METER IP

## 2019-09-22 PROCEDURE — 83605 ASSAY OF LACTIC ACID: CPT

## 2019-09-22 PROCEDURE — 25000125 ZZHC RX 250: Performed by: SURGERY

## 2019-09-22 PROCEDURE — 83735 ASSAY OF MAGNESIUM: CPT | Performed by: INTERNAL MEDICINE

## 2019-09-22 PROCEDURE — 25800025 ZZH RX 258: Performed by: SURGERY

## 2019-09-22 PROCEDURE — 82947 ASSAY GLUCOSE BLOOD QUANT: CPT | Performed by: INTERNAL MEDICINE

## 2019-09-22 PROCEDURE — 25000128 H RX IP 250 OP 636: Performed by: INTERNAL MEDICINE

## 2019-09-22 PROCEDURE — 99291 CRITICAL CARE FIRST HOUR: CPT | Mod: GC | Performed by: INTERNAL MEDICINE

## 2019-09-22 PROCEDURE — 85027 COMPLETE CBC AUTOMATED: CPT | Performed by: INTERNAL MEDICINE

## 2019-09-22 PROCEDURE — 40000014 ZZH STATISTIC ARTERIAL MONITORING DAILY

## 2019-09-22 PROCEDURE — 97110 THERAPEUTIC EXERCISES: CPT | Mod: GO | Performed by: OCCUPATIONAL THERAPIST

## 2019-09-22 PROCEDURE — 85610 PROTHROMBIN TIME: CPT | Performed by: INTERNAL MEDICINE

## 2019-09-22 PROCEDURE — 25000132 ZZH RX MED GY IP 250 OP 250 PS 637: Performed by: STUDENT IN AN ORGANIZED HEALTH CARE EDUCATION/TRAINING PROGRAM

## 2019-09-22 PROCEDURE — 93308 TTE F-UP OR LMTD: CPT | Mod: 26 | Performed by: INTERNAL MEDICINE

## 2019-09-22 PROCEDURE — 25000125 ZZHC RX 250: Performed by: INTERNAL MEDICINE

## 2019-09-22 PROCEDURE — 83605 ASSAY OF LACTIC ACID: CPT | Performed by: INTERNAL MEDICINE

## 2019-09-22 PROCEDURE — 85610 PROTHROMBIN TIME: CPT | Performed by: STUDENT IN AN ORGANIZED HEALTH CARE EDUCATION/TRAINING PROGRAM

## 2019-09-22 PROCEDURE — 82805 BLOOD GASES W/O2 SATURATION: CPT | Performed by: INTERNAL MEDICINE

## 2019-09-22 PROCEDURE — 25000132 ZZH RX MED GY IP 250 OP 250 PS 637: Performed by: SURGERY

## 2019-09-22 PROCEDURE — 80053 COMPREHEN METABOLIC PANEL: CPT | Performed by: INTERNAL MEDICINE

## 2019-09-22 PROCEDURE — 25000131 ZZH RX MED GY IP 250 OP 636 PS 637: Performed by: INTERNAL MEDICINE

## 2019-09-22 RX ADMIN — Medication 100 MCG/HR: at 05:30

## 2019-09-22 RX ADMIN — THIAMINE HCL TAB 100 MG 100 MG: 100 TAB at 07:31

## 2019-09-22 RX ADMIN — HUMAN ALBUMIN MICROSPHERES AND PERFLUTREN 5 ML: 10; .22 INJECTION, SOLUTION INTRAVENOUS at 09:30

## 2019-09-22 RX ADMIN — PREDNISONE 40 MG: 20 TABLET ORAL at 07:30

## 2019-09-22 RX ADMIN — DEXMEDETOMIDINE 1 MCG/KG/HR: 100 INJECTION, SOLUTION, CONCENTRATE INTRAVENOUS at 01:49

## 2019-09-22 RX ADMIN — PANTOPRAZOLE SODIUM 40 MG: 40 INJECTION, POWDER, FOR SOLUTION INTRAVENOUS at 21:00

## 2019-09-22 RX ADMIN — FUROSEMIDE 60 MG: 10 INJECTION, SOLUTION INTRAVENOUS at 07:30

## 2019-09-22 RX ADMIN — MULTIPLE VITAMINS W/ MINERALS TAB 1 TABLET: TAB at 07:30

## 2019-09-22 RX ADMIN — IPRATROPIUM BROMIDE AND ALBUTEROL SULFATE 3 ML: .5; 3 SOLUTION RESPIRATORY (INHALATION) at 08:09

## 2019-09-22 RX ADMIN — DEXMEDETOMIDINE 1.2 MCG/KG/HR: 100 INJECTION, SOLUTION, CONCENTRATE INTRAVENOUS at 12:01

## 2019-09-22 RX ADMIN — ACETYLCYSTEINE 2 ML: 200 SOLUTION ORAL; RESPIRATORY (INHALATION) at 08:09

## 2019-09-22 RX ADMIN — PANTOPRAZOLE SODIUM 40 MG: 40 INJECTION, POWDER, FOR SOLUTION INTRAVENOUS at 07:29

## 2019-09-22 RX ADMIN — DEXMEDETOMIDINE 1 MCG/KG/HR: 100 INJECTION, SOLUTION, CONCENTRATE INTRAVENOUS at 04:52

## 2019-09-22 RX ADMIN — PIPERACILLIN SODIUM AND TAZOBACTAM SODIUM 3.38 G: 3; .375 INJECTION, POWDER, LYOPHILIZED, FOR SOLUTION INTRAVENOUS at 21:00

## 2019-09-22 RX ADMIN — MICONAZOLE NITRATE: 20 POWDER TOPICAL at 07:31

## 2019-09-22 RX ADMIN — DEXMEDETOMIDINE 1.2 MCG/KG/HR: 100 INJECTION, SOLUTION, CONCENTRATE INTRAVENOUS at 06:55

## 2019-09-22 RX ADMIN — PIPERACILLIN SODIUM AND TAZOBACTAM SODIUM 3.38 G: 3; .375 INJECTION, POWDER, LYOPHILIZED, FOR SOLUTION INTRAVENOUS at 14:29

## 2019-09-22 RX ADMIN — DEXTROSE MONOHYDRATE: 100 INJECTION, SOLUTION INTRAVENOUS at 21:55

## 2019-09-22 RX ADMIN — PIPERACILLIN SODIUM AND TAZOBACTAM SODIUM 3.38 G: 3; .375 INJECTION, POWDER, LYOPHILIZED, FOR SOLUTION INTRAVENOUS at 07:30

## 2019-09-22 RX ADMIN — ACETAMINOPHEN 325 MG: 325 TABLET, FILM COATED ORAL at 07:30

## 2019-09-22 RX ADMIN — HEPARIN SODIUM 2950 UNITS/HR: 10000 INJECTION, SOLUTION INTRAVENOUS at 12:00

## 2019-09-22 RX ADMIN — MICONAZOLE NITRATE: 20 POWDER TOPICAL at 21:11

## 2019-09-22 RX ADMIN — HEPARIN SODIUM 2950 UNITS/HR: 10000 INJECTION, SOLUTION INTRAVENOUS at 21:17

## 2019-09-22 RX ADMIN — PIPERACILLIN SODIUM AND TAZOBACTAM SODIUM 3.38 G: 3; .375 INJECTION, POWDER, LYOPHILIZED, FOR SOLUTION INTRAVENOUS at 01:38

## 2019-09-22 ASSESSMENT — ACTIVITIES OF DAILY LIVING (ADL)
ADLS_ACUITY_SCORE: 16
ADLS_ACUITY_SCORE: 18
ADLS_ACUITY_SCORE: 16

## 2019-09-22 NOTE — PROGRESS NOTES
Pt extubated to FM and then transitioned to HFNC 25L 2 40%. Fair cough. WIll continue to follow and set up bipap for Nocs.    Joanna Han RRT

## 2019-09-22 NOTE — PROGRESS NOTES
Cardiology Progress Note    Assessment & Plan      59 F with a history of hypertension, hyperlipidemia, sleep apnea, morbid obesity, and chronic bronchitis who presented to [facility] with massive PE with following PEA arrest 9/9. Transferred here for VA ECMO thrombectomy and catheter directed lytics on 9/9, went for VA ECMO decannulation on 9/17 complicated by hypercarbic respiratory failure s/p VV ECMO through RIJ-FV bypass s/p decannulation on 9/19. CT with no clot in PA. RV function improved.    Plan for today  - try PS and extubation  - await reculture for low fevers  - ECHO for RV function  - check CVP for guide diuresis  - PT/PT      Neurology: Intubated, sedated  --continue versed, fentanyl, precedex - hypotensive with propofol   - initial CTH no acute changes, moving when sedation is weaned down and following commands   - weaning sedation post decannulation to extubate   Cardiovascular / Hemodynamics: PEA arrest secondary to massive PE.   CTA-PE study demonstrates bilateral distal main and proximal lobar pulmonary emboli, greater on the right with elevated RV/LV ratio. Peripheral V-A ECMO inserted for hemodynamic support for massive PE. Went to cath lab and then to IR for suction mechanical thrombectomy.  LA 8.6 then normalized.  TTE: dilated RV, severely reduced. Turn down 9/16 LVEF 45-55%, RV function mildly reduced and trivial pericardial effusion stable oxygenation.     --heparin gtt  - on dobutamine at 2.5   Pulmonary: PEA arrest secondary to massive PE.   History of heavy smoking   Ventilation Mode: CMV/AC  (Continuous Mandatory Ventilation/ Assist Control)  FiO2 (%): 50 %  Rate Set (breaths/minute): 12 breaths/min  Tidal Volume Set (mL): 500 mL  PEEP (cm H2O): 8 cmH2O  Oxygen Concentration (%): 50 %  Resp: 17  -s/p thrombectomy and catheter directed lytics 9/9-9/10  - prednisone 40/d X5 d(9/19-23) for COPD exacerbation in the setting of pneumonia   --- scheduled duonebs   chest CT with contrast 9/21  no PE, no RV strain  LLL atelectasis. Less pleural effusion     GI and Nutrition: No known medical hx.    - nutrition consult   --GI Prophylaxis: PPI    Renal, Fluid and Electrolytes: - volume overloaded. diuresis with Lasix 60mg every day  - follow CVP    Infectious Disease: Aspiration pneumonia -- sputum culture MSSA  --vancomycin/zosyn x5 days for ECMO, vanc last dose 9/15, cefazolin, on 9/17 went for VA ECMO decannulation which was complicated by hypercarbic respiratory failure and was therefore transitioned to VV ECMO  - CXR with pulmonary edema got 1.4L of crystalloid in the OR and two units of product. Restarted diureses and broadened back to pip/tazo with repeat cultures.   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 Likely from consumption   Endocrinology: No known medical history. BG elevated.  --insulin gtt   Lines: R radial arterial line September 9, 2019  RIJ Sept 9 2019  ETT September 9, 2019  Underwood catheter September 9, 2019  OG tube September 9, 2019  Restraint: needed    Current lines are required for patient management       Family update by me today: Yes      Code Status:     The pt was discussed and evaluated with Dr. Tk Olivarez MD cardiology fellow    Interval History     No significant event.   On heparin ggt  Started steroid burst for COPD 9/19-    Ventilation Mode: CMV/AC  (Continuous Mandatory Ventilation/ Assist Control)  FiO2 (%): 50 %  Rate Set (breaths/minute): 12 breaths/min  Tidal Volume Set (mL): 500 mL  PEEP (cm H2O): 8 cmH2O  Oxygen Concentration (%): 50 %  Resp: 17        Physical Exam   Temp: 100.4  F (38  C) Temp src: Axillary     Heart Rate: 73 Resp: 17 SpO2: 99 % O2 Device: Mechanical Ventilator    Vitals:    09/19/19 0200 09/20/19 0530 09/21/19 0400   Weight: (!) 179 kg (394 lb 10 oz) (!) 176.5 kg (389 lb 1.8 oz) (!) 176 kg (388 lb  "0.2 oz)     Vital Signs with Ranges  Temp:  [98.6  F (37  C)-100.6  F (38.1  C)] 100.4  F (38  C)  Heart Rate:  [72-94] 73  Resp:  [13-25] 17  MAP:  [73 mmHg-113 mmHg] 79 mmHg  Arterial Line BP: (115-148)/(46-94) 128/55  FiO2 (%):  [50 %] 50 %  SpO2:  [97 %-100 %] 99 %  I/O last 3 completed shifts:  In: 4447.25 [I.V.:2632.25; NG/GT:450]  Out: 5395 [Urine:4870; Emesis/NG output:500; Stool:25]    Heart Rate: 73, Blood pressure 102/53, pulse 95, temperature 100.4  F (38  C), temperature source Axillary, resp. rate 17, height 1.676 m (5' 5.98\"), weight (!) 176 kg (388 lb 0.2 oz), SpO2 99 %, not currently breastfeeding.  388 lbs .15 oz  GEN:  Morbidly obese sedated and intubated  CV:  Distant heart sounds   LUNGS:  Decreased breath sounds, coarse crackles  ABD:  Active bowel sounds, soft, non-tender/non-distended.  No rebound/guarding/rigidity.  EXT:  No edema or cyanosis.  ECMO decannulation sites clean withouot active bleeding   Underwood in place    Medications     dexmedetomidine 1.2 mcg/kg/hr (09/22/19 0700)     IV fluid REPLACEMENT ONLY       IV fluid REPLACEMENT ONLY       DOBUTamine 2.5 mcg/kg/min (09/22/19 0700)     fentaNYL 100 mcg/hr (09/22/19 0700)     HEParin 2,950 Units/hr (09/22/19 0700)     - MEDICATION INSTRUCTIONS -       midazolam 4 mg/hr (09/22/19 0700)     - MEDICATION INSTRUCTIONS -       norepinephrine Stopped (09/20/19 1200)     - MEDICATION INSTRUCTIONS -         furosemide  60 mg Intravenous Daily     insulin aspart  1-6 Units Subcutaneous Q4H     miconazole   Topical BID     multivitamin w/minerals  1 tablet Oral or Feeding Tube Daily     pantoprazole (PROTONIX) IV  40 mg Intravenous BID     piperacillin-tazobactam  3.375 g Intravenous Q6H     predniSONE  40 mg Oral Daily     sodium chloride (PF)  3 mL Intracatheter Q8H     vitamin B1  100 mg Oral or Feeding Tube Daily       Data   Recent Labs   Lab 09/22/19  0446 09/22/19  0100 09/21/19  2019 09/21/19  1535  09/21/19  1021 09/21/19  0324 " 09/21/19  0323 09/20/19 2032 09/20/19  1551   WBC 12.2*  --   --   --   --   --  13.8*  --  15.4* 14.8*   HGB 8.5*  --   --   --   --   --  8.5*  --  8.6* 8.5*   MCV 96  --   --   --   --   --  95  --  94 94   PLT 85*  --   --   --   --   --  93*  --  79* 86*   INR 1.29*  --   --  1.27*  --  1.27* 1.20*  --  1.29* 1.24*   *  --   --   --   --   --  147*  --  142 141   POTASSIUM 3.9  --   --   --   --   --  3.7 3.8 4.1  4.3 4.5  4.4   CHLORIDE 110*  --   --   --   --   --  106  --  104 103   CO2 32  --   --   --   --   --  35*  --  33* 32   BUN 45*  --   --   --   --   --  50*  --  52* 53*   CR 0.88  --   --   --   --   --  0.91  --  0.84 0.99   ANIONGAP 4  --   --   --   --   --  6  --  5 7   MARIA INES 9.4  --   --   --   --   --  9.2  --  9.1 9.4   *  123* 126* 131* 130*   < >  --  125* 122* 145*  145* 147*  142*   ALBUMIN 2.4*  --   --   --   --   --  2.4*  --   --  2.3*   PROTTOTAL 6.7*  --   --   --   --   --  6.6*  --   --  7.0   BILITOTAL 0.3  --   --   --   --   --  0.4  --   --  0.4   ALKPHOS 83  --   --   --   --   --  90  --   --  100   ALT 30  --   --   --   --   --  31  --   --  30   AST 13  --   --   --   --   --  19  --   --  19   TROPI  --   --   --   --   --   --  <0.015  --  <0.015 <0.015    < > = values in this interval not displayed.       Recent Results (from the past 24 hour(s))   CT Chest Pulmonary Embolism w Contrast    Narrative    EXAMINATION: CTA pulmonary angiogram, 9/21/2019 3:17 PM     COMPARISON: CT 9/14/2019     HISTORY: PE suspected, high pretest prob; PE status post EKOS VA VV  ECMO still clot in PA    TECHNIQUE: Volumetric helical acquisition of CT images of the chest  from the lung apices to the kidneys were acquired after the  administration of 80 mL of Isovue-370 IV contrast. .  Post-processed  multiplanar and/or MIP reformations were obtained, archived to PACS  and used in interpretation of this study.     FINDINGS:  Contrast bolus is: adequate.  Exam is negative    for acute pulmonary embolism. No evidence of right heart strain.    There is complete atelectasis of the left lower lobe. There are  peribronchial geographic round glass opacities throughout the rest of  the lungs with greater opacities in the right middle lobe inferiorly.  There is a loculated left pleural effusion small in size.    In the subcutaneous tissues midline, there is a 3.5 x 3.5 x 4.8 cm  density likely hematoma from resuscitation.  Limited images of the  upper abdomen are unremarkable.    Endotracheal tube tip in the midtrachea. Enteric tubes coursing into  the stomach. Right IJ catheter with tip in the low superior vena cava.    Bones and soft tissues: Multiple bilateral rib fractures.      Impression    IMPRESSION:   1. Exam is negative for acute pulmonary embolism.  2. Complete atelectasis left lower lobe. Small loculated left pleural  effusion.  3. Geographic groundglass opacities throughout the rest of the lungs  greater opacity in the right middle lobe inferiorly anteriorly.  Differential pulmonary edema, small vessel disease, infection.      LILIANA BONNER MD   XR Chest Port 1 View    Narrative    XR CHEST PORT 1 VW  9/22/2019 1:38 AM      HISTORY: intubated    COMPARISON: CT earlier today    FINDINGS: Endotracheal tube tip projects over the mid trachea.  Right-sided central venous catheter tip projects over the right  atrium. Consolidative left lower lobe opacities with a moderate left  pleural effusion. Unchanged right basilar opacities. No pneumothorax.  Stable cardiac enlargement.      Impression    IMPRESSION: No interval change.    I have personally reviewed the examination and initial interpretation  and I agree with the findings.    JUSTEN CANO MD         Critical Care Services Progress Note:     Shira Rodriguez remains critically ill with PE     I personally examined and evaluated the patient today.   The patient s prognosis today is tentative  I have evaluated all laboratory  values and imaging studies from the past 24 hours.  Key findings and decisions made today included   - try PS and extubation  - await reculture for low fevers  - ECHO for RV function  - check CVP for guide diuresis  - PT/PT   I personally managed the ventilator, sedation, pain control and analgesia, metabolic abnormalities, antibiotic therapy, nutritional status and vasoactive medications.   Consults ongoing and ordered are none  Procedures that will happen today are: none  All treatments were placed at my direction.  I formulated today s plan with the house staff team or resident(s) and agree with the findings and plan in the associated note.       The above plans and care have been discussed with family and all questions and concerns were addressed.     I spent a total of 45 minutes (excluding procedure time) personally providing and directing critical care services at the bedside and on the critical care unit for Shira Rodriguez.        Richard Frey MD

## 2019-09-22 NOTE — PLAN OF CARE
PT 4E: Discharge Planner PT   Patient plan for discharge: nods head in response to TCU  Current status: Total A of lift and A of 2-3 for rolling and bed to recliner using ultrasling/ceiling lift.  Progressed from mod Ax2 to CGA for fwd trunk lean for decreased trunk support in recliner, holding for a max of 15 sec, complicated by feet do not contact floor.  Good participation in seated LE ex and nods to communicate.  Vitals stable (RR 19-21) on 50% FiO2 VCAC, PEEP 8.  Barriers to return to prior living situation: weakness, fatigue, command following  Recommendations for discharge: TCU  Rationale for recommendations: to progress transfers and gait.

## 2019-09-22 NOTE — PLAN OF CARE
Discharge Planner OT   Patient plan for discharge: not stated  Current status: Pt mod A BUE therex, grossly 3/5 MMT BUE below 90 degrees shoulder flx. Pt's VSS on 50% FIO2 PS 5.  Barriers to return to prior living situation: medical status  Recommendations for discharge: TCU  Rationale for recommendations: to increase ind in ADLS/IADLS       Entered by: Lady Joe 09/22/2019 12:49 PM

## 2019-09-22 NOTE — PLAN OF CARE
D: Pt s/p PE, cardiac arrest with ECMO. Decannulated 9/19. Radiology reports left lung abscess per CT.     Neuro: Follows commands, MAEx4. Restless all night. Sedated on Versed at 4, Precedex at 1.2, fentanyl at 100.  Cardiac: SR 70's. MAPs 70's. Dobutamine gtt at 2.5, heparin at 2950. Therapeutic Hep 10a this am.  Resp: vent CMV  rate 12 fio2 50%. Thick secretions with suction. Coarse lung sounds.  Gi: TF at 65 ml/hr, with free water flush 75ml q4hr.   G/U: french in place. Good UOP  Skin: excoriated in skin folds, interdry and powder in place. Turning q2hr    P: follow up for lung abscess. Wean sedation as tolerated.

## 2019-09-23 ENCOUNTER — APPOINTMENT (OUTPATIENT)
Dept: OCCUPATIONAL THERAPY | Facility: CLINIC | Age: 59
DRG: 003 | End: 2019-09-23
Payer: COMMERCIAL

## 2019-09-23 ENCOUNTER — APPOINTMENT (OUTPATIENT)
Dept: GENERAL RADIOLOGY | Facility: CLINIC | Age: 59
DRG: 003 | End: 2019-09-23
Payer: COMMERCIAL

## 2019-09-23 LAB
ALBUMIN SERPL-MCNC: 2.5 G/DL (ref 3.4–5)
ALP SERPL-CCNC: 76 U/L (ref 40–150)
ALT SERPL W P-5'-P-CCNC: 27 U/L (ref 0–50)
ANION GAP SERPL CALCULATED.3IONS-SCNC: 5 MMOL/L (ref 3–14)
APTT PPP: 118 SEC (ref 22–37)
AST SERPL W P-5'-P-CCNC: 13 U/L (ref 0–45)
AT III ACT/NOR PPP CHRO: 65 % (ref 85–135)
BACTERIA SPEC CULT: ABNORMAL
BASE EXCESS BLDA CALC-SCNC: 7.2 MMOL/L
BASE EXCESS BLDA CALC-SCNC: 7.7 MMOL/L
BILIRUB SERPL-MCNC: 0.4 MG/DL (ref 0.2–1.3)
BUN SERPL-MCNC: 36 MG/DL (ref 7–30)
CALCIUM SERPL-MCNC: 9.2 MG/DL (ref 8.5–10.1)
CHLORIDE SERPL-SCNC: 111 MMOL/L (ref 94–109)
CO2 SERPL-SCNC: 31 MMOL/L (ref 20–32)
CREAT SERPL-MCNC: 0.85 MG/DL (ref 0.52–1.04)
ERYTHROCYTE [DISTWIDTH] IN BLOOD BY AUTOMATED COUNT: 15.7 % (ref 10–15)
GFR SERPL CREATININE-BSD FRML MDRD: 75 ML/MIN/{1.73_M2}
GLUCOSE BLD-MCNC: 98 MG/DL (ref 70–99)
GLUCOSE BLDC GLUCOMTR-MCNC: 117 MG/DL (ref 70–99)
GLUCOSE BLDC GLUCOMTR-MCNC: 123 MG/DL (ref 70–99)
GLUCOSE BLDC GLUCOMTR-MCNC: 88 MG/DL (ref 70–99)
GLUCOSE BLDC GLUCOMTR-MCNC: 93 MG/DL (ref 70–99)
GLUCOSE BLDC GLUCOMTR-MCNC: 95 MG/DL (ref 70–99)
GLUCOSE BLDC GLUCOMTR-MCNC: 99 MG/DL (ref 70–99)
GLUCOSE SERPL-MCNC: 98 MG/DL (ref 70–99)
HCO3 BLD-SCNC: 32 MMOL/L (ref 21–28)
HCO3 BLD-SCNC: 33 MMOL/L (ref 21–28)
HCT VFR BLD AUTO: 29.4 % (ref 35–47)
HGB BLD-MCNC: 8.8 G/DL (ref 11.7–15.7)
INR PPP: 1.34 (ref 0.86–1.14)
INTERPRETATION ECG - MUSE: NORMAL
LACTATE BLD-SCNC: 0.6 MMOL/L (ref 0.7–2)
LDH SERPL L TO P-CCNC: 273 U/L (ref 81–234)
LMWH PPP CHRO-ACNC: 0.52 IU/ML
LMWH PPP CHRO-ACNC: 0.76 IU/ML
Lab: ABNORMAL
MAGNESIUM SERPL-MCNC: 2.7 MG/DL (ref 1.6–2.3)
MCH RBC QN AUTO: 28.8 PG (ref 26.5–33)
MCHC RBC AUTO-ENTMCNC: 29.9 G/DL (ref 31.5–36.5)
MCV RBC AUTO: 96 FL (ref 78–100)
O2/TOTAL GAS SETTING VFR VENT: ABNORMAL %
O2/TOTAL GAS SETTING VFR VENT: ABNORMAL %
OXYHGB MFR BLD: 93 % (ref 92–100)
PCO2 BLD: 44 MM HG (ref 35–45)
PCO2 BLD: 48 MM HG (ref 35–45)
PH BLD: 7.44 PH (ref 7.35–7.45)
PH BLD: 7.47 PH (ref 7.35–7.45)
PLATELET # BLD AUTO: 96 10E9/L (ref 150–450)
PO2 BLD: 57 MM HG (ref 80–105)
PO2 BLD: 75 MM HG (ref 80–105)
POTASSIUM SERPL-SCNC: 3.3 MMOL/L (ref 3.4–5.3)
POTASSIUM SERPL-SCNC: 4 MMOL/L (ref 3.4–5.3)
PROT SERPL-MCNC: 6.9 G/DL (ref 6.8–8.8)
RBC # BLD AUTO: 3.06 10E12/L (ref 3.8–5.2)
SODIUM SERPL-SCNC: 147 MMOL/L (ref 133–144)
SPECIMEN SOURCE: ABNORMAL
TROPONIN I SERPL-MCNC: <0.015 UG/L (ref 0–0.04)
WBC # BLD AUTO: 12.4 10E9/L (ref 4–11)

## 2019-09-23 PROCEDURE — C9113 INJ PANTOPRAZOLE SODIUM, VIA: HCPCS | Performed by: SURGERY

## 2019-09-23 PROCEDURE — 83605 ASSAY OF LACTIC ACID: CPT | Performed by: INTERNAL MEDICINE

## 2019-09-23 PROCEDURE — 25000128 H RX IP 250 OP 636

## 2019-09-23 PROCEDURE — 85520 HEPARIN ASSAY: CPT | Performed by: INTERNAL MEDICINE

## 2019-09-23 PROCEDURE — 25000128 H RX IP 250 OP 636: Performed by: STUDENT IN AN ORGANIZED HEALTH CARE EDUCATION/TRAINING PROGRAM

## 2019-09-23 PROCEDURE — 82805 BLOOD GASES W/O2 SATURATION: CPT | Performed by: INTERNAL MEDICINE

## 2019-09-23 PROCEDURE — 25000132 ZZH RX MED GY IP 250 OP 250 PS 637: Performed by: SURGERY

## 2019-09-23 PROCEDURE — 25000132 ZZH RX MED GY IP 250 OP 250 PS 637: Performed by: STUDENT IN AN ORGANIZED HEALTH CARE EDUCATION/TRAINING PROGRAM

## 2019-09-23 PROCEDURE — 93010 ELECTROCARDIOGRAM REPORT: CPT | Performed by: INTERNAL MEDICINE

## 2019-09-23 PROCEDURE — 40000141 ZZH STATISTIC PERIPHERAL IV START W/O US GUIDANCE

## 2019-09-23 PROCEDURE — 71045 X-RAY EXAM CHEST 1 VIEW: CPT

## 2019-09-23 PROCEDURE — 25000131 ZZH RX MED GY IP 250 OP 636 PS 637: Performed by: INTERNAL MEDICINE

## 2019-09-23 PROCEDURE — 85027 COMPLETE CBC AUTOMATED: CPT | Performed by: INTERNAL MEDICINE

## 2019-09-23 PROCEDURE — 25000128 H RX IP 250 OP 636: Performed by: SURGERY

## 2019-09-23 PROCEDURE — 25000128 H RX IP 250 OP 636: Performed by: INTERNAL MEDICINE

## 2019-09-23 PROCEDURE — 82803 BLOOD GASES ANY COMBINATION: CPT | Performed by: INTERNAL MEDICINE

## 2019-09-23 PROCEDURE — 85610 PROTHROMBIN TIME: CPT | Performed by: INTERNAL MEDICINE

## 2019-09-23 PROCEDURE — 27210437 ZZH NUTRITION PRODUCT SEMIELEM INTERMED LITER

## 2019-09-23 PROCEDURE — 25000132 ZZH RX MED GY IP 250 OP 250 PS 637: Performed by: INTERNAL MEDICINE

## 2019-09-23 PROCEDURE — 82947 ASSAY GLUCOSE BLOOD QUANT: CPT | Performed by: INTERNAL MEDICINE

## 2019-09-23 PROCEDURE — 83735 ASSAY OF MAGNESIUM: CPT | Performed by: INTERNAL MEDICINE

## 2019-09-23 PROCEDURE — 84132 ASSAY OF SERUM POTASSIUM: CPT | Performed by: INTERNAL MEDICINE

## 2019-09-23 PROCEDURE — 99291 CRITICAL CARE FIRST HOUR: CPT | Mod: GC | Performed by: INTERNAL MEDICINE

## 2019-09-23 PROCEDURE — 93005 ELECTROCARDIOGRAM TRACING: CPT

## 2019-09-23 PROCEDURE — 80053 COMPREHEN METABOLIC PANEL: CPT | Performed by: INTERNAL MEDICINE

## 2019-09-23 PROCEDURE — 83615 LACTATE (LD) (LDH) ENZYME: CPT | Performed by: SURGERY

## 2019-09-23 PROCEDURE — 84484 ASSAY OF TROPONIN QUANT: CPT | Performed by: INTERNAL MEDICINE

## 2019-09-23 PROCEDURE — 85730 THROMBOPLASTIN TIME PARTIAL: CPT | Performed by: INTERNAL MEDICINE

## 2019-09-23 PROCEDURE — 97530 THERAPEUTIC ACTIVITIES: CPT | Mod: GO | Performed by: OCCUPATIONAL THERAPIST

## 2019-09-23 PROCEDURE — 12000001 ZZH R&B MED SURG/OB UMMC

## 2019-09-23 PROCEDURE — 85300 ANTITHROMBIN III ACTIVITY: CPT | Performed by: SURGERY

## 2019-09-23 PROCEDURE — 97110 THERAPEUTIC EXERCISES: CPT | Mod: GO | Performed by: OCCUPATIONAL THERAPIST

## 2019-09-23 PROCEDURE — 00000146 ZZHCL STATISTIC GLUCOSE BY METER IP

## 2019-09-23 RX ORDER — CEFTRIAXONE 2 G/1
2 INJECTION, POWDER, FOR SOLUTION INTRAMUSCULAR; INTRAVENOUS EVERY 24 HOURS
Status: COMPLETED | OUTPATIENT
Start: 2019-09-23 | End: 2019-09-27

## 2019-09-23 RX ORDER — ATORVASTATIN CALCIUM 40 MG/1
40 TABLET, FILM COATED ORAL EVERY EVENING
Status: DISCONTINUED | OUTPATIENT
Start: 2019-09-23 | End: 2019-10-07 | Stop reason: HOSPADM

## 2019-09-23 RX ORDER — METOPROLOL TARTRATE 1 MG/ML
INJECTION, SOLUTION INTRAVENOUS
Status: DISCONTINUED
Start: 2019-09-23 | End: 2019-09-23 | Stop reason: HOSPADM

## 2019-09-23 RX ORDER — WARFARIN SODIUM 7.5 MG/1
7.5 TABLET ORAL
Status: COMPLETED | OUTPATIENT
Start: 2019-09-23 | End: 2019-09-23

## 2019-09-23 RX ADMIN — CEFTRIAXONE SODIUM 2 G: 2 INJECTION, POWDER, FOR SOLUTION INTRAMUSCULAR; INTRAVENOUS at 16:31

## 2019-09-23 RX ADMIN — POTASSIUM CHLORIDE 20 MEQ: 29.8 INJECTION, SOLUTION INTRAVENOUS at 03:20

## 2019-09-23 RX ADMIN — MICONAZOLE NITRATE: 20 POWDER TOPICAL at 19:59

## 2019-09-23 RX ADMIN — PREDNISONE 40 MG: 20 TABLET ORAL at 10:33

## 2019-09-23 RX ADMIN — PIPERACILLIN SODIUM AND TAZOBACTAM SODIUM 3.38 G: 3; .375 INJECTION, POWDER, LYOPHILIZED, FOR SOLUTION INTRAVENOUS at 14:44

## 2019-09-23 RX ADMIN — ATORVASTATIN CALCIUM 40 MG: 40 TABLET, FILM COATED ORAL at 19:59

## 2019-09-23 RX ADMIN — MULTIPLE VITAMINS W/ MINERALS TAB 1 TABLET: TAB at 10:34

## 2019-09-23 RX ADMIN — FUROSEMIDE 60 MG: 10 INJECTION, SOLUTION INTRAVENOUS at 09:15

## 2019-09-23 RX ADMIN — WARFARIN SODIUM 7.5 MG: 7.5 TABLET ORAL at 18:12

## 2019-09-23 RX ADMIN — PANTOPRAZOLE SODIUM 40 MG: 40 INJECTION, POWDER, FOR SOLUTION INTRAVENOUS at 19:59

## 2019-09-23 RX ADMIN — PIPERACILLIN SODIUM AND TAZOBACTAM SODIUM 3.38 G: 3; .375 INJECTION, POWDER, LYOPHILIZED, FOR SOLUTION INTRAVENOUS at 03:57

## 2019-09-23 RX ADMIN — PANTOPRAZOLE SODIUM 40 MG: 40 INJECTION, POWDER, FOR SOLUTION INTRAVENOUS at 09:26

## 2019-09-23 RX ADMIN — POTASSIUM CHLORIDE 20 MEQ: 29.8 INJECTION, SOLUTION INTRAVENOUS at 05:31

## 2019-09-23 RX ADMIN — AMIODARONE HYDROCHLORIDE 75 MG: 1.5 INJECTION, SOLUTION INTRAVENOUS at 03:26

## 2019-09-23 RX ADMIN — POTASSIUM CHLORIDE 20 MEQ: 750 TABLET, EXTENDED RELEASE ORAL at 19:59

## 2019-09-23 RX ADMIN — PIPERACILLIN SODIUM AND TAZOBACTAM SODIUM 3.38 G: 3; .375 INJECTION, POWDER, LYOPHILIZED, FOR SOLUTION INTRAVENOUS at 09:15

## 2019-09-23 RX ADMIN — HEPARIN SODIUM 2850 UNITS/HR: 10000 INJECTION, SOLUTION INTRAVENOUS at 06:24

## 2019-09-23 RX ADMIN — POTASSIUM CHLORIDE 20 MEQ: 29.8 INJECTION, SOLUTION INTRAVENOUS at 04:19

## 2019-09-23 RX ADMIN — THIAMINE HCL TAB 100 MG 100 MG: 100 TAB at 10:34

## 2019-09-23 RX ADMIN — HEPARIN SODIUM 2850 UNITS/HR: 10000 INJECTION, SOLUTION INTRAVENOUS at 15:18

## 2019-09-23 RX ADMIN — POTASSIUM CHLORIDE 20 MEQ: 29.8 INJECTION, SOLUTION INTRAVENOUS at 07:53

## 2019-09-23 RX ADMIN — MICONAZOLE NITRATE: 20 POWDER TOPICAL at 12:20

## 2019-09-23 ASSESSMENT — ACTIVITIES OF DAILY LIVING (ADL)
ADLS_ACUITY_SCORE: 16
ADLS_ACUITY_SCORE: 14
ADLS_ACUITY_SCORE: 16

## 2019-09-23 NOTE — PLAN OF CARE
OT/CR 4C: Discharge Planner OT   Patient plan for discharge: not discussed  Current status: Pt is alert, conversational, oriented to place, situation and month but not date or time of day.  Overall pt follows commands well; however, demonstrates intermittent delayed processing and impaired attention.  Pt mod A to roll while supine,  Dependent for bedside transfers, able to engage in UE strengthening exercises while seated. VSS on 2L o2 via nc.  Recommend ordering bariatric recliner for improved fit when OOB  Barriers to return to prior living situation: generalized weakness and deconditioning, below baseline cognition, impaired mobility  Recommendations for discharge: TCU  Rationale for recommendations: Pt is currently below baseline with regards to mobility and independence with self cares and will benefit from continued skilled therapy intervention to address deficits.         Entered by: Janis Sanchez 09/23/2019 1:26 PM

## 2019-09-23 NOTE — PLAN OF CARE
ICU End of Shift Summary. See flowsheets for vital signs and detailed assessment.    Changes this shift: Lateral transfer from . Extubated at 1400, arrived to 4C approx 1715. Put on 4 LPM via NC and off HFNC, tolerated well.     Plan:  Continue to monitor resp status. Work with therapies. Transfer off ICU.

## 2019-09-23 NOTE — PLAN OF CARE
"PT 4C: Cancel- Patient declining therapy this afternoon despite encouragement, reports she is  fatigued and \"just not up for anything\". Discussed plan to work with therapy tomorrow. Will reschedule.  "

## 2019-09-23 NOTE — PROGRESS NOTES
Cardiology Brief note:    Brief (~20 min) run of atrial fibrillation with rates ~170 bpm noted overnight. BP remained stable, and patient relatively asymptomatic. She denied SOB, palpitations, dizziness or chest pain to me. Bedside echocardiography consistent with normal biventricular function and no effusion. Will stop dobutamine given normal to hyperdynamic biventricular function.     Small dose of IV amiodarone (75 mg) converted patient within 10 minutes. Will replete potassium. Other electrolytes recently checked and within normal limits.     Rikki Baig

## 2019-09-23 NOTE — PLAN OF CARE
ICU End of Shift Summary. See flowsheets for vital signs and detailed assessment.    Changes this shift: Alert but forgetful of time. Passed bedside swallow, started on clear liquid diet. internal jugular, ART line, french and rectal tube removed today. Started on coumadin, continue heparin.     Plan: Transfer to floor. Continue to encourage patient to work with therapy.       Problem: Chronic Respiratory Difficulty Comorbidity  Goal: Chronic Respiratory Difficulty  Description  Patient comorbidity will be monitored for signs and symptoms of Respiratory Difficulty (Chronic) condition.  Problems will be absent, minimized or managed by discharge/transition of care.  Outcome: Improving     Problem: Hypertension Comorbidity  Goal: Blood Pressure in Desired Range  Outcome: Improving

## 2019-09-23 NOTE — PHARMACY-ANTICOAGULATION SERVICE
Clinical Pharmacy - Warfarin Dosing Consult     Pharmacy has been consulted to manage this patient s warfarin therapy.  Indication: DVT/ PE Treatment  Therapy Goal: INR 2-3  Provider/Team: [facility]  Warfarin Prior to Admission: No  Significant drug interactions: heparin (as bridge to warfarin theraoy), broad spectrum antibiotics  Recent documented change in oral intake/nutrition: Yes(currently on enteral feeds which have the potential to decrease warfarin absorption)    INR   Date Value Ref Range Status   09/23/2019 1.34 (H) 0.86 - 1.14 Final   09/22/2019 1.26 (H) 0.86 - 1.14 Final       Recommend warfarin 7.5 mg today.  Pharmacy will monitor Shira Rodriguez daily and order warfarin doses to achieve specified goal.      Please contact pharmacy as soon as possible if the warfarin needs to be held for a procedure or if the warfarin goals change.      Luisa Esquivel, PharmD

## 2019-09-23 NOTE — PROGRESS NOTES
[facility]   Cardiology Progress Note    Assessment & Plan   Shira Rodriguez is a 59 F with a history of hypertension, hyperlipidemia, sleep apnea, morbid obesity, and chronic bronchitis who presented to [facility] with massive PE with following PEA arrest 9/9. Transferred here for VA ECMO thrombectomy and catheter directed lytics on 9/9, went for VA ECMO decannulation on 9/17 complicated by hypercarbic respiratory failure s/p VV ECMO through RIJ-FV bypass s/p decannulation on 9/19. CT with no clot in PA. RV function improved.    Changes Today:  - Transfer to floor  - Start warfarin, continue hep gtt  - Continue lasix 60 mg IV daily for now with goal net negative 0.5-1L daily     Neurology: Off all sedation as of 9/22. Initial CT Head no acute changes, fully alert and oriented.  - Will continue to monitor   Cardiovascular / Hemodynamics: PEA arrest secondary to massive PE. CTA-PE study demonstrates bilateral distal main and proximal lobar pulmonary emboli, greater on the right with elevated RV/LV ratio. Peripheral V-A ECMO inserted for hemodynamic support for massive PE. Went to cath lab and then to IR for suction mechanical thrombectomy. LA 8.6 then normalized. VA converted to VV ECMO, then decannulated on 9/19.  TTE: dilated RV, severely reduced. Repeat TTE with LVEF 45-55%, RV function mildly reduced and trivial pericardial effusion stable oxygenation.   - High intensity heparin gtt, started warfarin on 9/23  - Dobutamine stopped on 9/23 AM after Pt went into Afib with RVR that converted at amio bolus. Given RV improvement, no need for this further.   Pulmonary: PEA arrest secondary to massive PE. History of heavy smoking. s/p thrombectomy and catheter directed lytics 9/9-9/10.  CT Chest with contrast 9/21 no PE, no RV strain.  LLL atelectasis. Less pleural effusions.  Extubated 9/22.  - Continue prednisone 40 mg daily x5 days (9/19-23) for COPD exacerbation in the setting of pneumonia   - Scheduled duonebs   - Weaning  oxygen as able   GI and Nutrition: No known medical hx.    - Nutrition consult   - GI Prophylaxis: PPI    Renal, Fluid and Electrolytes: # Volume overloaded  - Diuresis with Lasix 60 mg IV daily with goal net negative 0.5-1L daily for now   Infectious Disease: Aspiration pneumonia: sputum culture MSSA  - Completed vancomycin/zosyn x5 days for ECMO, vanc last dose 9/15, cefazolin on 9/17. Went for VA ECMO decannulation, which was complicated by hypercarbic respiratory failure, and was therefore transitioned to VV ECMO.  - CXR with pulmonary edema got 1.5 L of crystalloid in the OR and two units of product. Restarted diureses and broadened back to pip/tazo with repeat cultures.   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 Likely from consumption   Endocrinology: No known medical history. BG elevated.  --insulin gtt   Lines: R radial arterial line September 9, 2019  RIJ Sept 9 2019  ETT September 9, 2019  Underwood catheter September 9, 2019  OG tube September 9, 2019  Restraint: needed    Current lines are required for patient management      Code Status: FULL    Pt was seen and examined with Dr. Frey, who agrees with the assessment and plan.    Brooklynn Gilbert MD  Cardiology Fellow      Interval History   Went into AFib with RVR last night, converted after 75 mg bolus of amiodarone and stopped dobutamine.    ROS: A 4-point ROS was otherwise negative except as above.    Data reviewed today: I reviewed all new labs and imaging results over the last 24 hours. I personally reviewed:    Physical Exam   Temp: 98.7  F (37.1  C) Temp src: Axillary     Heart Rate: 99 Resp: 16 SpO2: 98 % O2 Device: Nasal cannula Oxygen Delivery: 3 LPM  Vitals:    09/19/19 0200 09/20/19 0530 09/21/19 0400   Weight: (!) 179 kg (394 lb 10 oz) (!) 176.5 kg (389 lb 1.8 oz) (!) 176 kg (388 lb 0.2 oz)     Vital  "Signs with Ranges  Temp:  [97.9  F (36.6  C)-100.6  F (38.1  C)] 98.7  F (37.1  C)  Heart Rate:  [] 99  Resp:  [16-26] 16  MAP:  [70 mmHg-93 mmHg] 75 mmHg  Arterial Line BP: (105-163)/(48-66) 108/55  FiO2 (%):  [50 %] 50 %  SpO2:  [95 %-100 %] 98 %  I/O last 3 completed shifts:  In: 3915.79 [I.V.:2245.79; NG/GT:435]  Out: 4305 [Urine:4105; Stool:200]    Heart Rate: 99, Blood pressure 102/53, pulse 95, temperature 98.7  F (37.1  C), resp. rate 16, height 1.676 m (5' 5.98\"), weight (!) 176 kg (388 lb 0.2 oz), SpO2 98 %, not currently breastfeeding.  388 lbs .15 oz  GEN: Alert, interactive, NAD, morbidly obese  CV: Regular rate and rhythm, distant heart sounds  LUNGS: Diminished breath sounds with bibasilar crackles  ABD: Active bowel sounds, soft, non-tender/non-distended. No rebound/guarding/rigidity  EXT: No pitting edema or cyanosis, ECMO decannulation site is c/d/i without hematoma  SKIN: Warm and dry, no lesions on exposed surfaces    Medications     IV fluid REPLACEMENT ONLY 65 mL/hr at 09/22/19 2155     IV fluid REPLACEMENT ONLY       HEParin 2,850 Units/hr (09/23/19 0624)     - MEDICATION INSTRUCTIONS -       - MEDICATION INSTRUCTIONS -       - MEDICATION INSTRUCTIONS -         furosemide  60 mg Intravenous Daily     insulin aspart  1-6 Units Subcutaneous Q4H     metoprolol         miconazole   Topical BID     multivitamin w/minerals  1 tablet Oral or Feeding Tube Daily     pantoprazole (PROTONIX) IV  40 mg Intravenous BID     piperacillin-tazobactam  3.375 g Intravenous Q6H     predniSONE  40 mg Oral Daily     sodium chloride (PF)  3 mL Intracatheter Q8H     vitamin B1  100 mg Oral or Feeding Tube Daily       Data   Recent Labs   Lab 09/23/19  0300 09/22/19  2130 09/22/19  1549  09/22/19  1005 09/22/19  0446  09/21/19  0324  09/20/19 2032   WBC 12.4*  --   --   --   --  12.2*  --  13.8*  --  15.4*   HGB 8.8*  --   --   --   --  8.5*  --  8.5*  --  8.6*   MCV 96  --   --   --   --  96  --  95  --  94 "   PLT 96*  --   --   --   --  85*  --  93*  --  79*   INR 1.34* 1.26* 1.34*   < >  --  1.29*   < > 1.20*  --  1.29*   *  --   --   --   --  147*  --  147*  --  142   POTASSIUM 3.3*  --  4.0  --  4.1 3.9  --  3.7   < > 4.1  4.3   CHLORIDE 111*  --   --   --   --  110*  --  106  --  104   CO2 31  --   --   --   --  32  --  35*  --  33*   BUN 36*  --   --   --   --  45*  --  50*  --  52*   CR 0.85  --   --   --   --  0.88  --  0.91  --  0.84   ANIONGAP 5  --   --   --   --  4  --  6  --  5   MARIA INES 9.2  --   --   --   --  9.4  --  9.2  --  9.1   GLC 98  98 91 131*   < > 142* 121*  123*   < > 125*   < > 145*  145*   ALBUMIN 2.5*  --   --   --   --  2.4*  --  2.4*  --   --    PROTTOTAL 6.9  --   --   --   --  6.7*  --  6.6*  --   --    BILITOTAL 0.4  --   --   --   --  0.3  --  0.4  --   --    ALKPHOS 76  --   --   --   --  83  --  90  --   --    ALT 27  --   --   --   --  30  --  31  --   --    AST 13  --   --   --   --  13  --  19  --   --    TROPI <0.015  --   --   --   --   --   --  <0.015  --  <0.015    < > = values in this interval not displayed.       Recent Results (from the past 24 hour(s))   XR Chest Port 1 View    Narrative    Exam:  XR CHEST PORT 1 VW, 9/23/2019 3:46 AM    History: Follow up.  Previously intubated.    Comparison:  9/22/2019    Findings:  Single AP view of the chest. Interval extubation and  removal of enteric tubes. Right IJ central venous catheter tip  projects over the high right atrium. Stable enlargement of the cardiac  silhouette. Small left pleural effusion and adjacent opacities are not  significantly changed. Persistent diffuse bilateral mixed  interstitial/airspace opacities. Visualized upper abdomen is  unremarkable.      Impression    Impression:    1. Small left pleural effusion and adjacent atelectasis and/or  consolidation.  2. Stable mixed interstitial/airspace opacities, likely pulmonary  edema.  3. Extubated and removal of enteric tubes.    I have personally reviewed  the examination and initial interpretation  and I agree with the findings.    BRADFORD MONTOYA MD       Critical Care Services Progress Note:     Shira Rodriguez remains critically ill with PE     I personally examined and evaluated the patient today.   The patient s prognosis today is tentative  I have evaluated all laboratory values and imaging studies from the past 24 hours.  Key findings and decisions made today included   - Transfer to floor  - Start warfarin, continue hep gtt  - Continue lasix 60 mg IV daily for now with goal net negative 0.5-1L daily    I personally managed the ventilator, sedation, pain control and analgesia, metabolic abnormalities, antibiotic therapy, nutritional status and vasoactive medications.   Consults ongoing and ordered are none  Procedures that will happen today are: none  All treatments were placed at my direction.  I formulated today s plan with the house staff team or resident(s) and agree with the findings and plan in the associated note.       The above plans and care have been discussed with family and all questions and concerns were addressed.     I spent a total of 45 minutes (excluding procedure time) personally providing and directing critical care services at the bedside and on the critical care unit for Shira Rodriguez.        Richard Frey MD

## 2019-09-24 ENCOUNTER — APPOINTMENT (OUTPATIENT)
Dept: PHYSICAL THERAPY | Facility: CLINIC | Age: 59
DRG: 003 | End: 2019-09-24
Payer: COMMERCIAL

## 2019-09-24 ENCOUNTER — APPOINTMENT (OUTPATIENT)
Dept: OCCUPATIONAL THERAPY | Facility: CLINIC | Age: 59
DRG: 003 | End: 2019-09-24
Payer: COMMERCIAL

## 2019-09-24 ENCOUNTER — APPOINTMENT (OUTPATIENT)
Dept: GENERAL RADIOLOGY | Facility: CLINIC | Age: 59
DRG: 003 | End: 2019-09-24
Payer: COMMERCIAL

## 2019-09-24 LAB
ALBUMIN SERPL-MCNC: 2.5 G/DL (ref 3.4–5)
ALP SERPL-CCNC: 74 U/L (ref 40–150)
ALT SERPL W P-5'-P-CCNC: 25 U/L (ref 0–50)
ANION GAP SERPL CALCULATED.3IONS-SCNC: 7 MMOL/L (ref 3–14)
AST SERPL W P-5'-P-CCNC: 11 U/L (ref 0–45)
BACTERIA SPEC CULT: NO GROWTH
BACTERIA SPEC CULT: NO GROWTH
BILIRUB SERPL-MCNC: 0.3 MG/DL (ref 0.2–1.3)
BUN SERPL-MCNC: 29 MG/DL (ref 7–30)
CALCIUM SERPL-MCNC: 9.3 MG/DL (ref 8.5–10.1)
CHLORIDE SERPL-SCNC: 111 MMOL/L (ref 94–109)
CO2 SERPL-SCNC: 26 MMOL/L (ref 20–32)
CREAT SERPL-MCNC: 0.77 MG/DL (ref 0.52–1.04)
ERYTHROCYTE [DISTWIDTH] IN BLOOD BY AUTOMATED COUNT: 15.8 % (ref 10–15)
GFR SERPL CREATININE-BSD FRML MDRD: 85 ML/MIN/{1.73_M2}
GLUCOSE BLDC GLUCOMTR-MCNC: 76 MG/DL (ref 70–99)
GLUCOSE BLDC GLUCOMTR-MCNC: 80 MG/DL (ref 70–99)
GLUCOSE BLDC GLUCOMTR-MCNC: 82 MG/DL (ref 70–99)
GLUCOSE BLDC GLUCOMTR-MCNC: 98 MG/DL (ref 70–99)
GLUCOSE BLDC GLUCOMTR-MCNC: 99 MG/DL (ref 70–99)
GLUCOSE SERPL-MCNC: 84 MG/DL (ref 70–99)
HCT VFR BLD AUTO: 32.3 % (ref 35–47)
HGB BLD-MCNC: 9.5 G/DL (ref 11.7–15.7)
INR PPP: 1.33 (ref 0.86–1.14)
INTERPRETATION ECG - MUSE: NORMAL
LMWH PPP CHRO-ACNC: 0.67 IU/ML
LMWH PPP CHRO-ACNC: 0.72 IU/ML
Lab: NORMAL
Lab: NORMAL
MAGNESIUM SERPL-MCNC: 2.7 MG/DL (ref 1.6–2.3)
MCH RBC QN AUTO: 28.9 PG (ref 26.5–33)
MCHC RBC AUTO-ENTMCNC: 29.4 G/DL (ref 31.5–36.5)
MCV RBC AUTO: 98 FL (ref 78–100)
PHOSPHATE SERPL-MCNC: 3.8 MG/DL (ref 2.5–4.5)
PLATELET # BLD AUTO: 57 10E9/L (ref 150–450)
POTASSIUM SERPL-SCNC: 3.5 MMOL/L (ref 3.4–5.3)
PROT SERPL-MCNC: 6.8 G/DL (ref 6.8–8.8)
RBC # BLD AUTO: 3.29 10E12/L (ref 3.8–5.2)
SODIUM SERPL-SCNC: 144 MMOL/L (ref 133–144)
SPECIMEN SOURCE: NORMAL
SPECIMEN SOURCE: NORMAL
WBC # BLD AUTO: 10.4 10E9/L (ref 4–11)

## 2019-09-24 PROCEDURE — C9113 INJ PANTOPRAZOLE SODIUM, VIA: HCPCS | Performed by: SURGERY

## 2019-09-24 PROCEDURE — 25000132 ZZH RX MED GY IP 250 OP 250 PS 637: Performed by: INTERNAL MEDICINE

## 2019-09-24 PROCEDURE — 25000128 H RX IP 250 OP 636: Performed by: STUDENT IN AN ORGANIZED HEALTH CARE EDUCATION/TRAINING PROGRAM

## 2019-09-24 PROCEDURE — 25000132 ZZH RX MED GY IP 250 OP 250 PS 637: Performed by: SURGERY

## 2019-09-24 PROCEDURE — 21400000 ZZH R&B CCU UMMC

## 2019-09-24 PROCEDURE — 97535 SELF CARE MNGMENT TRAINING: CPT | Mod: GO | Performed by: OCCUPATIONAL THERAPIST

## 2019-09-24 PROCEDURE — 80053 COMPREHEN METABOLIC PANEL: CPT | Performed by: STUDENT IN AN ORGANIZED HEALTH CARE EDUCATION/TRAINING PROGRAM

## 2019-09-24 PROCEDURE — 36415 COLL VENOUS BLD VENIPUNCTURE: CPT | Performed by: STUDENT IN AN ORGANIZED HEALTH CARE EDUCATION/TRAINING PROGRAM

## 2019-09-24 PROCEDURE — 83735 ASSAY OF MAGNESIUM: CPT | Performed by: STUDENT IN AN ORGANIZED HEALTH CARE EDUCATION/TRAINING PROGRAM

## 2019-09-24 PROCEDURE — 97530 THERAPEUTIC ACTIVITIES: CPT | Mod: GP

## 2019-09-24 PROCEDURE — 25000132 ZZH RX MED GY IP 250 OP 250 PS 637: Performed by: STUDENT IN AN ORGANIZED HEALTH CARE EDUCATION/TRAINING PROGRAM

## 2019-09-24 PROCEDURE — 85520 HEPARIN ASSAY: CPT | Performed by: PHYSICIAN ASSISTANT

## 2019-09-24 PROCEDURE — 99233 SBSQ HOSP IP/OBS HIGH 50: CPT | Performed by: PHYSICIAN ASSISTANT

## 2019-09-24 PROCEDURE — 36415 COLL VENOUS BLD VENIPUNCTURE: CPT | Performed by: PHYSICIAN ASSISTANT

## 2019-09-24 PROCEDURE — 85027 COMPLETE CBC AUTOMATED: CPT | Performed by: STUDENT IN AN ORGANIZED HEALTH CARE EDUCATION/TRAINING PROGRAM

## 2019-09-24 PROCEDURE — 85520 HEPARIN ASSAY: CPT | Performed by: STUDENT IN AN ORGANIZED HEALTH CARE EDUCATION/TRAINING PROGRAM

## 2019-09-24 PROCEDURE — 00000146 ZZHCL STATISTIC GLUCOSE BY METER IP

## 2019-09-24 PROCEDURE — 93005 ELECTROCARDIOGRAM TRACING: CPT

## 2019-09-24 PROCEDURE — 25000128 H RX IP 250 OP 636: Performed by: SURGERY

## 2019-09-24 PROCEDURE — 71045 X-RAY EXAM CHEST 1 VIEW: CPT

## 2019-09-24 PROCEDURE — 85610 PROTHROMBIN TIME: CPT | Performed by: STUDENT IN AN ORGANIZED HEALTH CARE EDUCATION/TRAINING PROGRAM

## 2019-09-24 PROCEDURE — 84100 ASSAY OF PHOSPHORUS: CPT | Performed by: STUDENT IN AN ORGANIZED HEALTH CARE EDUCATION/TRAINING PROGRAM

## 2019-09-24 PROCEDURE — 25000128 H RX IP 250 OP 636: Performed by: INTERNAL MEDICINE

## 2019-09-24 RX ORDER — WARFARIN SODIUM 7.5 MG/1
7.5 TABLET ORAL
Status: COMPLETED | OUTPATIENT
Start: 2019-09-24 | End: 2019-09-24

## 2019-09-24 RX ADMIN — MICONAZOLE NITRATE: 20 POWDER TOPICAL at 20:52

## 2019-09-24 RX ADMIN — PANTOPRAZOLE SODIUM 40 MG: 40 INJECTION, POWDER, FOR SOLUTION INTRAVENOUS at 20:48

## 2019-09-24 RX ADMIN — POTASSIUM CHLORIDE 20 MEQ: 750 TABLET, EXTENDED RELEASE ORAL at 04:21

## 2019-09-24 RX ADMIN — WARFARIN SODIUM 7.5 MG: 7.5 TABLET ORAL at 18:10

## 2019-09-24 RX ADMIN — THIAMINE HCL TAB 100 MG 100 MG: 100 TAB at 08:06

## 2019-09-24 RX ADMIN — MULTIPLE VITAMINS W/ MINERALS TAB 1 TABLET: TAB at 08:06

## 2019-09-24 RX ADMIN — FUROSEMIDE 60 MG: 10 INJECTION, SOLUTION INTRAVENOUS at 08:06

## 2019-09-24 RX ADMIN — MELATONIN TAB 3 MG 3 MG: 3 TAB at 00:17

## 2019-09-24 RX ADMIN — HEPARIN SODIUM 2750 UNITS/HR: 10000 INJECTION, SOLUTION INTRAVENOUS at 09:45

## 2019-09-24 RX ADMIN — CEFTRIAXONE SODIUM 2 G: 2 INJECTION, POWDER, FOR SOLUTION INTRAMUSCULAR; INTRAVENOUS at 14:59

## 2019-09-24 RX ADMIN — HEPARIN SODIUM 2750 UNITS/HR: 10000 INJECTION, SOLUTION INTRAVENOUS at 19:12

## 2019-09-24 RX ADMIN — HEPARIN SODIUM 2850 UNITS/HR: 10000 INJECTION, SOLUTION INTRAVENOUS at 00:40

## 2019-09-24 RX ADMIN — PANTOPRAZOLE SODIUM 40 MG: 40 INJECTION, POWDER, FOR SOLUTION INTRAVENOUS at 08:06

## 2019-09-24 RX ADMIN — ATORVASTATIN CALCIUM 40 MG: 40 TABLET, FILM COATED ORAL at 20:48

## 2019-09-24 ASSESSMENT — ACTIVITIES OF DAILY LIVING (ADL)
ADLS_ACUITY_SCORE: 13

## 2019-09-24 ASSESSMENT — MIFFLIN-ST. JEOR: SCORE: 2316.5

## 2019-09-24 NOTE — PLAN OF CARE
ICU End of Shift Summary. See flowsheets for vital signs and detailed assessment.    Changes this shift: VSS. 2L NC. Diet advanced to Regular with calorie counts. Heparin drip maintained. Up to chair for four hours with rehab.    Plan: Transfer to /C.

## 2019-09-24 NOTE — PROGRESS NOTES
[facility]   Cardiology Progress Note    Assessment & Plan   Shira Rodriguez is a 59 F with a history of hypertension, hyperlipidemia, sleep apnea, morbid obesity, and chronic bronchitis who presented to [facility] with massive PE with following PEA arrest 9/9 and ROSC after intubation/resusitation. Transferred here for VA ECMO thrombectomy and catheter directed lytics on 9/9, went for VA ECMO decannulation on 9/17 complicated by hypercarbic respiratory failure s/p VV ECMO through RIJ-FV bypass s/p decannulation on 9/19. Repeat CT with no residual clot in PA. RV function improved to normal. She is being bridged to a therapeutic INR and has minimal oxygen requirements at this stage.     Changes Today:  - Transfer to floor  - Start warfarin, continue hep gtt until INR >2  - Continue lasix 60 mg IV daily for now with goal net negative 0.5-1L daily     Neurology: Off all sedation as of 9/22. Initial CT Head no acute changes, fully alert and oriented.  - Will continue to monitor   Cardiovascular / Hemodynamics: PEA arrest secondary to massive PE. CTA-PE study demonstrates bilateral distal main and proximal lobar pulmonary emboli, greater on the right with elevated RV/LV ratio. Peripheral V-A ECMO inserted for hemodynamic support for massive PE. Went to cath lab and then to IR for suction mechanical thrombectomy. LA 8.6 then normalized. VA converted to VV ECMO, then decannulated on 9/19.  TTE: dilated RV, severely reduced. Repeat TTE with LVEF 45-55%, RV function mildly reduced and trivial pericardial effusion stable oxygenation.   - High intensity heparin gtt, started warfarin on 9/23  - Dobutamine stopped on 9/23 AM after Pt went into Afib with RVR that converted at amio bolus. Given RV improvement, no need for this further.  -Continue IV diuresis for now until bump in creat   Pulmonary: PEA arrest secondary to massive PE. History of heavy smoking. s/p thrombectomy and catheter directed lytics 9/9-9/10.  CT Chest with contrast  9/21 no PE, no RV strain.  LLL atelectasis. Less pleural effusions.  Extubated 9/22.  - Continue prednisone 40 mg daily x5 days (9/19-23) for COPD exacerbation in the setting of pneumonia   - Scheduled duonebs   - Weaning oxygen as able   GI and Nutrition: No known medical hx.    - Nutrition consult   - GI Prophylaxis: PPI    Renal, Fluid and Electrolytes: # Volume overloaded  - Diuresis with Lasix 60 mg IV daily with goal net negative 0.5-1L daily for now  - 9/24/19 unreported output due to incontinence   Infectious Disease: Aspiration pneumonia: sputum culture MSSA  - Completed vancomycin/zosyn x5 days for ECMO, vanc last dose 9/15, cefazolin on 9/17. Went for VA ECMO decannulation, which was complicated by hypercarbic respiratory failure, and was therefore transitioned to VV ECMO.  - CXR with pulmonary edema got 1.5 L of crystalloid in the OR and two units of product. Restarted diureses and broadened back to pip/tazo with repeat cultures.   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 Likely from consumption. Stable/improving   Endocrinology: No known medical history. BG elevated.  --insulin gtt   Lines: R radial arterial line September 9, 2019  RIJ Sept 9 2019  ETT September 9, 2019  Underwood catheter September 9, 2019  OG tube September 9, 2019  Restraint: needed    Current lines are required for patient management      Code Status: FULL    D/W Dr. Denys Campuzano, PAC  [facility]  [id number]      Interval History   No acute events overnight. Patient with minimal oxygen requirements. No specific complains this morning. Feeling well. Boarding in ICU still due to bed availability upstairs.     ROS: A 4-point ROS was otherwise negative except as above.    Data reviewed today: I reviewed all new labs and imaging results over the last 24 hours. I personally reviewed:    Physical Exam   Temp:  "98.2  F (36.8  C) Temp src: Oral BP: 131/78 Pulse: 97 Heart Rate: 100 Resp: 20 SpO2: 96 % O2 Device: Nasal cannula Oxygen Delivery: 2 LPM  Vitals:    09/19/19 0200 09/20/19 0530 09/21/19 0400   Weight: (!) 179 kg (394 lb 10 oz) (!) 176.5 kg (389 lb 1.8 oz) (!) 176 kg (388 lb 0.2 oz)     Vital Signs with Ranges  Temp:  [98  F (36.7  C)-98.6  F (37  C)] 98.2  F (36.8  C)  Pulse:  [] 97  Heart Rate:  [] 100  Resp:  [11-25] 20  BP: ()/(60-78) 131/78  MAP:  [69 mmHg-96 mmHg] 79 mmHg  Arterial Line BP: (100-135)/(49-89) 117/49  SpO2:  [90 %-100 %] 96 %  I/O last 3 completed shifts:  In: 1691.58 [P.O.:360; I.V.:1331.58]  Out: 2475 [Urine:2475]    Heart Rate: 100, Blood pressure 131/78, pulse 97, temperature 98.2  F (36.8  C), temperature source Oral, resp. rate 20, height 1.676 m (5' 5.98\"), weight (!) 176 kg (388 lb 0.2 oz), SpO2 96 %, not currently breastfeeding.  388 lbs .15 oz  GEN: Alert, interactive, NAD, morbidly obese  CV: Regular rate and rhythm, distant heart sounds  LUNGS: Diminished breath sounds with bibasilar crackles  ABD: Active bowel sounds, soft, non-tender/non-distended. No rebound/guarding/rigidity  EXT: No pitting edema or cyanosis, ECMO decannulation site is c/d/i without hematoma  SKIN: Warm and dry, no lesions on exposed surfaces    Medications     IV fluid REPLACEMENT ONLY 65 mL/hr at 09/22/19 2155     IV fluid REPLACEMENT ONLY       HEParin 2,750 Units/hr (09/24/19 1000)     - MEDICATION INSTRUCTIONS -       - MEDICATION INSTRUCTIONS -       - MEDICATION INSTRUCTIONS -       Warfarin Therapy Reminder         atorvastatin  40 mg Oral QPM     cefTRIAXone  2 g Intravenous Q24H     furosemide  60 mg Intravenous Daily     insulin aspart  1-6 Units Subcutaneous Q4H     miconazole   Topical BID     multivitamin w/minerals  1 tablet Oral or Feeding Tube Daily     pantoprazole (PROTONIX) IV  40 mg Intravenous BID     sodium chloride (PF)  3 mL Intracatheter Q8H     vitamin B1  100 mg Oral " or Feeding Tube Daily       Data   Recent Labs   Lab 09/24/19  0347 09/23/19  1231 09/23/19  0300 09/22/19  2130  09/22/19  0446  09/21/19 0324 09/20/19 2032   WBC 10.4  --  12.4*  --   --  12.2*  --  13.8*  --  15.4*   HGB 9.5*  --  8.8*  --   --  8.5*  --  8.5*  --  8.6*   MCV 98  --  96  --   --  96  --  95  --  94   PLT 57*  --  96*  --   --  85*  --  93*  --  79*   INR 1.33*  --  1.34* 1.26*   < > 1.29*   < > 1.20*  --  1.29*     --  147*  --   --  147*  --  147*  --  142   POTASSIUM 3.5 4.0 3.3*  --    < > 3.9  --  3.7   < > 4.1  4.3   CHLORIDE 111*  --  111*  --   --  110*  --  106  --  104   CO2 26  --  31  --   --  32  --  35*  --  33*   BUN 29  --  36*  --   --  45*  --  50*  --  52*   CR 0.77  --  0.85  --   --  0.88  --  0.91  --  0.84   ANIONGAP 7  --  5  --   --  4  --  6  --  5   MARIA INES 9.3  --  9.2  --   --  9.4  --  9.2  --  9.1   GLC 84  --  98  98 91   < > 121*  123*   < > 125*   < > 145*  145*   ALBUMIN 2.5*  --  2.5*  --   --  2.4*  --  2.4*  --   --    PROTTOTAL 6.8  --  6.9  --   --  6.7*  --  6.6*  --   --    BILITOTAL 0.3  --  0.4  --   --  0.3  --  0.4  --   --    ALKPHOS 74  --  76  --   --  83  --  90  --   --    ALT 25  --  27  --   --  30  --  31  --   --    AST 11  --  13  --   --  13  --  19  --   --    TROPI  --   --  <0.015  --   --   --   --  <0.015  --  <0.015    < > = values in this interval not displayed.       Recent Results (from the past 24 hour(s))   XR Chest Port 1 View    Narrative    Exam:  XR CHEST PORT 1 VW, 9/24/2019 5:39 AM    History: intubated    Comparison:  9/23/2019    Findings:  Single AP view of the chest. Cardiac silhouette is  enlarged. Left greater than right pleural effusions and adjacent  basilar opacities. Unchanged mixed interstitial/airspace opacities.  Visualized upper abdomen is unremarkable. Inferior subluxation of the  right humeral head.      Impression    Impression:    1. Left greater than right pleural effusions with adjacent  basilar  atelectasis and/or consolidation.   2. Cardiomegaly with findings suggestive of interstitial pulmonary  edema.    I have personally reviewed the examination and initial interpretation  and I agree with the findings.    CANDIDA ANTONIO, DO

## 2019-09-24 NOTE — PROGRESS NOTES
CLINICAL NUTRITION SERVICES - REASSESSMENT NOTE     Nutrition Prescription    RECOMMENDATIONS FOR MDs/PROVIDERS TO ORDER:  Encourage PO intakes, especially high protein to preserve LBM.     Malnutrition Status:    Patient does not meet two of the above criteria necessary for diagnosing malnutrition but is at risk for malnutrition    Recommendations already ordered by Registered Dietitian (RD):  Calorie counts  Supplements BID- Boost Plus  RD to discontinue TF orders, FT pulled upon extubated 9/22    Future/Additional Recommendations:  Monitor PO intakes and need for scheduled snacks/ supplements/ tolerance to supplements      EVALUATION OF THE PROGRESS TOWARD GOALS   Diet: Full Liquid>> regular   Nutrition Support: 9/10 - 9/22: Impact Peptide @ 65 ml/hr = 2340 kcals (13 kcal/kg dry wt/day), 147 g PRO (2.5 g/kg IBW/day), 1200 mL H2O, 100 g Fat (50% from MCTs), 218 g CHO and no Fiber --  -- Feeding tube pulled upon extubation     Intake:  Average TF intakes over the past 7 days: 1193 ml, 1789 kcals, 112 g pro. Unable to assess calorie/pro from PO intakes since diet adv on 9/22.      NEW FINDINGS   Weight: 4% wt loss in the past 11 days per chart review. 8% in the past 3 months. Pt is on lasix. Difficult to assess fluids vs true weight loss  Wt Readings from Last 10 Encounters:   09/21/19 (!) 176 kg (388 lb 0.2 oz)   09/09/19 (!) 178.6 kg (393 lb 11.9 oz)   08/12/19 (!) 181.4 kg (400 lb)   06/26/19 (!) 191 kg (421 lb)   05/23/19 (!) 195.9 kg (431 lb 14.4 oz)   05/20/19 (!) 198 kg (436 lb 8 oz)   04/29/19 (!) 194.6 kg (429 lb)   04/23/19 (!) 195 kg (429 lb 14.4 oz)   04/22/19 (!) 195 kg (430 lb)   04/08/19 (!) 195 kg (430 lb)     Labs: Triglycerides: 152 (H)  Meds: multivitamin, thiamine  GI: BM+ noted.   Resp: Extubated 9/22. decannulation on 9/19.    Dosing Weight:88 kg (adjusted- based on lowest wt of 176 kg, 59.1 kg- IBW)     RE-ASSESSED NUTRITION NEEDS  Estimated Energy Needs: 8820-5508 kcals/day (20-25  kcals/kg)  Justification: Morbid obesity  Estimated Protein Needs: 132-176 g/day (1.5-2 g/kg IBW)  Justification: Hypercatabolism with critical illness, morbid obesity  Estimated Fluid Needs: Per provider pending fluid status    MALNUTRITION  % Intake: Decreased intake does not meet criteria  % Weight Loss: weight loss does not meet criteria  Subcutaneous Fat Loss: None observed  Muscle Loss: None observed- difficult to assess 2/2 t o body habitus  Fluid Accumulation/Edema: Mild  Malnutrition Diagnosis: Patient does not meet two of the above criteria necessary for diagnosing malnutrition but is at risk for malnutrition    Previous Goals   Total avg nutritional intake to meet a minimum of 11 kcal/kg dry/admit weight (178.6 kg) and 2 g PRO/kg ideal body weight (59 kg).  Evaluation: Met- likely     Previous Nutrition Diagnosis  Increased nutrient needs (protein) related to critical illness and obesity guidelines as evidenced by assessed protein needs of 2-2.5 g protein/kg ideal body weight.     Evaluation: Improving    CURRENT NUTRITION DIAGNOSIS  Inadequate oral intake related to slow advancement of diet order as evidenced by full liquid diet at this time with TF off.      INTERVENTIONS  Implementation  Medical food supplement therapy  Modify composition of meals/snacks  Calorie counts 9/25-9/27    Goals  Total avg nutritional intake to meet a minimum of 20 kcal/kg and 1.5 g PRO/kg daily (per dosing wt 88 kg).    Monitoring/Evaluation  Progress toward goals will be monitored and evaluated per protocol.      Corry Irene, RD, MS, LD  SICU: [phone]

## 2019-09-24 NOTE — PLAN OF CARE
D/I/A: Afib -150's at 0330. BP stable, pt asymptomatic. Resident notified. 12 lead EKG completed. Self converted to SR 90's at 0635. Frequent cough noted. Up to bedside commode with lift last night, void 300 mL and small BM. Unmeasured void x1 on bedpan this am. Potassium replaced per protocol. Tolerating full liquid diet. HepXa supratheraputic at 0.72. Heparin gtt held for 30 min and restarted at 2750 unit(s)/h at 0515 per protocol. P: Recheck HepXa at 1115. Transfer when bed becomes available. Will continue to monitor.

## 2019-09-24 NOTE — PLAN OF CARE
OT/CR 4C: Discharge Planner OT   Patient plan for discharge: rehab - pt states she hopes to be able to discharge to her sister's home; however, acknowledges the possibility of need IP rehab at discharge  Current status: Pt is alert, conversational, more motivated and engaged in therapy today.  Mod A to roll while supine, dependent for bedside transfers,  set up to min A for g/h seated in bedside chair.  Attempted sit to stand using EZ stand though pt has difficulty with full hip extension for upright posture.  Barriers to return to prior living situation: generalized weakness, deconditioning, pt lives alone, acute medical needs  Recommendations for discharge: inpatient rehab (ARU vs TCU)  Rationale for recommendations: Pt is currently below baseline with regards to mobility and independence with self cares and will benefit from continued skilled therapy intervention to address deficits.         Entered by: Janis Sanchez 09/24/2019 2:00 PM

## 2019-09-25 ENCOUNTER — APPOINTMENT (OUTPATIENT)
Dept: PHYSICAL THERAPY | Facility: CLINIC | Age: 59
DRG: 003 | End: 2019-09-25
Payer: COMMERCIAL

## 2019-09-25 LAB
ALBUMIN SERPL-MCNC: 2.5 G/DL (ref 3.4–5)
ALP SERPL-CCNC: 71 U/L (ref 40–150)
ALT SERPL W P-5'-P-CCNC: 26 U/L (ref 0–50)
ANION GAP SERPL CALCULATED.3IONS-SCNC: 8 MMOL/L (ref 3–14)
AST SERPL W P-5'-P-CCNC: 14 U/L (ref 0–45)
BILIRUB SERPL-MCNC: 0.2 MG/DL (ref 0.2–1.3)
BUN SERPL-MCNC: 25 MG/DL (ref 7–30)
CALCIUM SERPL-MCNC: 9 MG/DL (ref 8.5–10.1)
CHLORIDE SERPL-SCNC: 111 MMOL/L (ref 94–109)
CO2 SERPL-SCNC: 26 MMOL/L (ref 20–32)
CREAT SERPL-MCNC: 0.8 MG/DL (ref 0.52–1.04)
ERYTHROCYTE [DISTWIDTH] IN BLOOD BY AUTOMATED COUNT: 16 % (ref 10–15)
GFR SERPL CREATININE-BSD FRML MDRD: 82 ML/MIN/{1.73_M2}
GLUCOSE BLDC GLUCOMTR-MCNC: 73 MG/DL (ref 70–99)
GLUCOSE BLDC GLUCOMTR-MCNC: 98 MG/DL (ref 70–99)
GLUCOSE SERPL-MCNC: 76 MG/DL (ref 70–99)
HCT VFR BLD AUTO: 32.8 % (ref 35–47)
HGB BLD-MCNC: 9.7 G/DL (ref 11.7–15.7)
INR PPP: 2.08 (ref 0.86–1.14)
INTERPRETATION ECG - MUSE: NORMAL
LACTATE BLD-SCNC: 1.4 MMOL/L (ref 0.7–2)
LMWH PPP CHRO-ACNC: 0.54 IU/ML
MAGNESIUM SERPL-MCNC: 2.2 MG/DL (ref 1.6–2.3)
MAGNESIUM SERPL-MCNC: 2.5 MG/DL (ref 1.6–2.3)
MCH RBC QN AUTO: 28.7 PG (ref 26.5–33)
MCHC RBC AUTO-ENTMCNC: 29.6 G/DL (ref 31.5–36.5)
MCV RBC AUTO: 97 FL (ref 78–100)
PHOSPHATE SERPL-MCNC: 3.7 MG/DL (ref 2.5–4.5)
PLATELET # BLD AUTO: ABNORMAL 10E9/L (ref 150–450)
POTASSIUM SERPL-SCNC: 3.4 MMOL/L (ref 3.4–5.3)
POTASSIUM SERPL-SCNC: 4.1 MMOL/L (ref 3.4–5.3)
PROT SERPL-MCNC: 6.8 G/DL (ref 6.8–8.8)
RBC # BLD AUTO: 3.38 10E12/L (ref 3.8–5.2)
SODIUM SERPL-SCNC: 144 MMOL/L (ref 133–144)
WBC # BLD AUTO: 8.4 10E9/L (ref 4–11)

## 2019-09-25 PROCEDURE — G0463 HOSPITAL OUTPT CLINIC VISIT: HCPCS

## 2019-09-25 PROCEDURE — 84100 ASSAY OF PHOSPHORUS: CPT | Performed by: STUDENT IN AN ORGANIZED HEALTH CARE EDUCATION/TRAINING PROGRAM

## 2019-09-25 PROCEDURE — 93005 ELECTROCARDIOGRAM TRACING: CPT

## 2019-09-25 PROCEDURE — 25000128 H RX IP 250 OP 636: Performed by: STUDENT IN AN ORGANIZED HEALTH CARE EDUCATION/TRAINING PROGRAM

## 2019-09-25 PROCEDURE — 80053 COMPREHEN METABOLIC PANEL: CPT | Performed by: STUDENT IN AN ORGANIZED HEALTH CARE EDUCATION/TRAINING PROGRAM

## 2019-09-25 PROCEDURE — 83735 ASSAY OF MAGNESIUM: CPT | Performed by: STUDENT IN AN ORGANIZED HEALTH CARE EDUCATION/TRAINING PROGRAM

## 2019-09-25 PROCEDURE — 84132 ASSAY OF SERUM POTASSIUM: CPT | Performed by: PHYSICIAN ASSISTANT

## 2019-09-25 PROCEDURE — 97530 THERAPEUTIC ACTIVITIES: CPT | Mod: GP

## 2019-09-25 PROCEDURE — 25000132 ZZH RX MED GY IP 250 OP 250 PS 637: Performed by: SURGERY

## 2019-09-25 PROCEDURE — 25000128 H RX IP 250 OP 636: Performed by: INTERNAL MEDICINE

## 2019-09-25 PROCEDURE — 00000146 ZZHCL STATISTIC GLUCOSE BY METER IP

## 2019-09-25 PROCEDURE — 99233 SBSQ HOSP IP/OBS HIGH 50: CPT | Performed by: INTERNAL MEDICINE

## 2019-09-25 PROCEDURE — 85520 HEPARIN ASSAY: CPT | Performed by: STUDENT IN AN ORGANIZED HEALTH CARE EDUCATION/TRAINING PROGRAM

## 2019-09-25 PROCEDURE — 85027 COMPLETE CBC AUTOMATED: CPT | Performed by: STUDENT IN AN ORGANIZED HEALTH CARE EDUCATION/TRAINING PROGRAM

## 2019-09-25 PROCEDURE — 25000132 ZZH RX MED GY IP 250 OP 250 PS 637: Performed by: INTERNAL MEDICINE

## 2019-09-25 PROCEDURE — 85610 PROTHROMBIN TIME: CPT | Performed by: STUDENT IN AN ORGANIZED HEALTH CARE EDUCATION/TRAINING PROGRAM

## 2019-09-25 PROCEDURE — 25000132 ZZH RX MED GY IP 250 OP 250 PS 637: Performed by: STUDENT IN AN ORGANIZED HEALTH CARE EDUCATION/TRAINING PROGRAM

## 2019-09-25 PROCEDURE — 36415 COLL VENOUS BLD VENIPUNCTURE: CPT | Performed by: STUDENT IN AN ORGANIZED HEALTH CARE EDUCATION/TRAINING PROGRAM

## 2019-09-25 PROCEDURE — 83605 ASSAY OF LACTIC ACID: CPT

## 2019-09-25 PROCEDURE — 93010 ELECTROCARDIOGRAM REPORT: CPT | Mod: 76 | Performed by: INTERNAL MEDICINE

## 2019-09-25 PROCEDURE — 25000128 H RX IP 250 OP 636: Performed by: SURGERY

## 2019-09-25 PROCEDURE — 21400000 ZZH R&B CCU UMMC

## 2019-09-25 PROCEDURE — 25000132 ZZH RX MED GY IP 250 OP 250 PS 637: Performed by: PHYSICIAN ASSISTANT

## 2019-09-25 PROCEDURE — 83735 ASSAY OF MAGNESIUM: CPT | Performed by: PHYSICIAN ASSISTANT

## 2019-09-25 PROCEDURE — C9113 INJ PANTOPRAZOLE SODIUM, VIA: HCPCS | Performed by: SURGERY

## 2019-09-25 PROCEDURE — 25000125 ZZHC RX 250: Performed by: PHYSICIAN ASSISTANT

## 2019-09-25 PROCEDURE — 25000128 H RX IP 250 OP 636: Performed by: PHYSICIAN ASSISTANT

## 2019-09-25 RX ORDER — ACETAMINOPHEN 325 MG/1
650 TABLET ORAL EVERY 6 HOURS PRN
Status: DISCONTINUED | OUTPATIENT
Start: 2019-09-25 | End: 2019-10-07 | Stop reason: HOSPADM

## 2019-09-25 RX ORDER — METOPROLOL TARTRATE 1 MG/ML
5 INJECTION, SOLUTION INTRAVENOUS EVERY 5 MIN PRN
Status: DISCONTINUED | OUTPATIENT
Start: 2019-09-25 | End: 2019-09-30

## 2019-09-25 RX ORDER — WARFARIN SODIUM 4 MG/1
4 TABLET ORAL
Status: COMPLETED | OUTPATIENT
Start: 2019-09-25 | End: 2019-09-25

## 2019-09-25 RX ORDER — OXYCODONE HYDROCHLORIDE 5 MG/1
5 TABLET ORAL EVERY 4 HOURS PRN
Status: DISCONTINUED | OUTPATIENT
Start: 2019-09-25 | End: 2019-10-07 | Stop reason: HOSPADM

## 2019-09-25 RX ADMIN — AMIODARONE HYDROCHLORIDE 150 MG: 1.5 INJECTION, SOLUTION INTRAVENOUS at 18:45

## 2019-09-25 RX ADMIN — FUROSEMIDE 60 MG: 10 INJECTION, SOLUTION INTRAVENOUS at 08:47

## 2019-09-25 RX ADMIN — Medication 12.5 MG: at 19:39

## 2019-09-25 RX ADMIN — ATORVASTATIN CALCIUM 40 MG: 40 TABLET, FILM COATED ORAL at 19:39

## 2019-09-25 RX ADMIN — HEPARIN SODIUM 2750 UNITS/HR: 10000 INJECTION, SOLUTION INTRAVENOUS at 06:18

## 2019-09-25 RX ADMIN — METOPROLOL TARTRATE 5 MG: 5 INJECTION INTRAVENOUS at 16:12

## 2019-09-25 RX ADMIN — MULTIPLE VITAMINS W/ MINERALS TAB 1 TABLET: TAB at 08:47

## 2019-09-25 RX ADMIN — ACETAMINOPHEN 325 MG: 325 TABLET, FILM COATED ORAL at 03:24

## 2019-09-25 RX ADMIN — CEFTRIAXONE SODIUM 2 G: 2 INJECTION, POWDER, FOR SOLUTION INTRAMUSCULAR; INTRAVENOUS at 14:25

## 2019-09-25 RX ADMIN — THIAMINE HCL TAB 100 MG 100 MG: 100 TAB at 08:47

## 2019-09-25 RX ADMIN — SODIUM CHLORIDE 500 ML: 9 INJECTION, SOLUTION INTRAVENOUS at 16:13

## 2019-09-25 RX ADMIN — WARFARIN SODIUM 4 MG: 4 TABLET ORAL at 17:30

## 2019-09-25 RX ADMIN — PANTOPRAZOLE SODIUM 40 MG: 40 INJECTION, POWDER, FOR SOLUTION INTRAVENOUS at 08:47

## 2019-09-25 RX ADMIN — MICONAZOLE NITRATE: 20 POWDER TOPICAL at 06:21

## 2019-09-25 RX ADMIN — ACETAMINOPHEN 650 MG: 325 TABLET, FILM COATED ORAL at 15:09

## 2019-09-25 RX ADMIN — MICONAZOLE NITRATE: 20 POWDER TOPICAL at 20:51

## 2019-09-25 RX ADMIN — PANTOPRAZOLE SODIUM 40 MG: 40 INJECTION, POWDER, FOR SOLUTION INTRAVENOUS at 19:39

## 2019-09-25 RX ADMIN — OXYCODONE HYDROCHLORIDE 5 MG: 5 TABLET ORAL at 17:30

## 2019-09-25 ASSESSMENT — ACTIVITIES OF DAILY LIVING (ADL)
ADLS_ACUITY_SCORE: 13
ADLS_ACUITY_SCORE: 13
ADLS_ACUITY_SCORE: 15
ADLS_ACUITY_SCORE: 13
ADLS_ACUITY_SCORE: 15
ADLS_ACUITY_SCORE: 13

## 2019-09-25 ASSESSMENT — PAIN DESCRIPTION - DESCRIPTORS: DESCRIPTORS: SORE

## 2019-09-25 NOTE — DISCHARGE INSTRUCTIONS
Pt will need outpatient INR and warfarin monitoring arranged prior to discharge from TCU, INR goal (2-3), Indication: PE

## 2019-09-25 NOTE — PLAN OF CARE
D: Admitted 9/9 following a PEA arrest, s/p ECMO c/b hypercarbic respiratory failure      PMHx: HTN, hyperlipidemia, sleep apnea, morbid obesity, and chronic bronchitis      I/A: A/Ox4. Pleasant. VSS on 2L NC. 15-20s bout of aflutter this am, 12lead EKG ordered. Converted to SR. No insulin needed. Denies pain, PRN tylenol given for sleep. Regular diet, yonatan counts x3 days initiated. 2x PIV right. Heparin infusing @2750 units/hr. Purewick in place. Interdry between pannus skin folds and breast tissue. Umbilical bulge/hernia unchanged. L groin sutures CDI. Total lift.     P: Being bridged to therapeutic INR.  Continue diurseing and notify CSI with changes/concerns.

## 2019-09-25 NOTE — PLAN OF CARE
PT 4C: Discharge Planner PT   Patient plan for discharge: not discussed today  Current status: Patient demonstrates improved mood and increased motivation for therapy today. Planned to continue working with EZ stand however patient discovered to have had large amount of stool incontinence, therefore patient required OH lift for dependent transfer chair>bed, mod A for supine rolling, min A to maintain sidelying position for extensive clean up and personal cares, fatigued after rolling, will continue work with EZ stand at next session. VSS throughout on 2L NC.  Barriers to return to prior living situation: generalized weakness, deconditioning, pt lives alone, acute medical needs  Recommendations for discharge: inpatient rehab (ARU vs TCU)  Rationale for recommendations: Pt is currently below baseline with regards to mobility and independence with self cares and will benefit from continued skilled therapy intervention to address deficits.

## 2019-09-25 NOTE — PROVIDER NOTIFICATION
Dr. Schumacher was notified about possible blood on the incontinent stool.  Patient has a small loose stool.  Will continue to monitor.

## 2019-09-25 NOTE — PLAN OF CARE
Shift summary [id number]  D: Pt  with a history of hypertension, hyperlipidemia, sleep apnea, morbid obesity, and chronic bronchitis who presented to [facility] with massive PE with following PEA arrest 9/9 and ROSC after intubation/resusitation. Transferred here for VA ECMO thrombectomy and catheter directed lytics on 9/9, went for VA ECMO decannulation on 9/17 complicated by hypercarbic respiratory failure s/p VV ECMO through RIJ-FV bypass s/p decannulation on 9/19. Repeat CT with no residual clot in PA. RV function improved to normal. She is being bridged to a therapeutic INR and has minimal oxygen requirements at this . Pt id go into Afib with rates up to 160. Pt SOB and fatigued. Pt's BP stable. Pt c/o increased chest pain with coughing. Pt eating well  I: Pt medicated with metoprolol 5 mg IV and amiodarone load of 150 mg.Pt given 500 ml NS FF x 1. Pt medicated with 5 mg of oxycodone po x 1.   A:Pt reported good relief with oxycodone. Pt continues to be in afib with rates decreased to   P: Metoprolol po ordered. Pt NPO after midnight for possible cardioversion. Continue PT/OT and CR. Continue pain control.    ADD: Pt converted back to SR rates  around 2145

## 2019-09-25 NOTE — PLAN OF CARE
PT 6C: Educated appropriately and weighed pros/cons of mobilization. Pt already mobilizing with nsg and having afib with HR into 130s at rest. Will forgo PT this date. Encouraged AROM/repositioning in bed as able

## 2019-09-25 NOTE — PROVIDER NOTIFICATION
ANAND LAINEZ  notified of 15-20sec run of aflutter. Pt currently SR and asymptomatic. 12lead EKG ordered.

## 2019-09-25 NOTE — PROGRESS NOTES
Pt was lifted into  for shower & hair wash this morning-able to tolerate small amt activity but pt quite deconditioned requiring max assist. Reports soreness in chest from CPR but declined need for medication.   Ate good breakfast meal but has since declined lunch yet. She reports she becomes very fatigued with any small activity, even eating.  At 1330 while sleeping she converted to Afib/flutter HRs 100s-160. Pt had episode of this is ICU as well. Denies chest pain nor any other symptoms. CSI notified-will give metoprolol if HR sustains >130s.   INR 2.08-heparin gtt stopped. Continues on warfarin and IV abxs daily.   Sats maintained on 2L NC. Attempting to wean O2 but sats mid 80s% on RA. Coarse nonproductive cough.   Continues diuresis w/IV lasix. Requested not to have purewick replaced after shower and is able to call for bedpan when needed.   Sleeping soundly after morning activity. Continue to monitor HR/rhythm.

## 2019-09-25 NOTE — PROGRESS NOTES
CVTS    S/p VA ecmo decannation in left groin. Incision c/d/i no erythema or drainage. Clean groin incision with microklenz BID. Has retention sutures in left groin. Keep until 10/1. Can be removed at that time if incision looks well healed. Do not hestitate to call if there are any questions or concerns.     Garland Spence PA-C  Cardiothoracic Surgery  September 25, 2019 10:12 AM   p: [phone]

## 2019-09-25 NOTE — PROGRESS NOTES
Brief Cardiology Note   September 25, 2019   1500     Called regarding patient as patient had gone into atrial fibrillation. Patient hemodynamically stable and asymptomatic. Rates are acceptable and less than 130 bpm. She is anticoagulated on coumadin starting today. She was noted to have atrial fibrillation while in ICU on dobutamine, dobutamine felt to be source. May incidentally have asymptomatic paroxysmal atrial fibrillation.     Plan:   -Check K, mag   -Check TSH   -Will give 500 ml's fluid back as she may be diuresed (no target dry weight)  -Will start oral lopressor 12.5 mg BID  -Will order an amiodarone bolus x 1   -Pain control for coughing pleuritic pain   -NPO MN in case LETA/DCCV necessary tomorrow

## 2019-09-25 NOTE — PROGRESS NOTES
[facility]   Cardiology Progress Note    Assessment & Plan   Shira Rodriguez is a 59 F with a history of hypertension, hyperlipidemia, sleep apnea, morbid obesity, and chronic bronchitis who presented to [facility] with massive PE with following PEA arrest 9/9 and ROSC after intubation/resusitation. Transferred here for VA ECMO thrombectomy and catheter directed lytics on 9/9, went for VA ECMO decannulation on 9/17 complicated by hypercarbic respiratory failure s/p VV ECMO through RIJ-FV bypass s/p decannulation on 9/19. Repeat CT with no residual clot in PA. RV function improved to normal. She is being bridged to a therapeutic INR and has minimal oxygen requirements at this stage.     Changes Today:  - Transferred to floor  -  INR 2.08, discontinued heparin gtt   - Continue lasix 60 mg IV daily for now with goal net negative 0.5-1L daily, transition to orals tomorrow   - Stitches out from cannulation wound mon/tues. Should patient leave prior, needs wound care order for 10/1/19 stitches removal. Complicated by location and pannus.      Neurology: Off all sedation as of 9/22. Initial CT Head no acute changes, fully alert and oriented.  - Will continue to monitor   Cardiovascular / Hemodynamics: PEA arrest secondary to massive PE. CTA-PE study demonstrates bilateral distal main and proximal lobar pulmonary emboli, greater on the right with elevated RV/LV ratio. Peripheral V-A ECMO inserted for hemodynamic support for massive PE. Went to cath lab and then to IR for suction mechanical thrombectomy. LA 8.6 then normalized. VA converted to VV ECMO, then decannulated on 9/19.  TTE: dilated RV, severely reduced. Repeat TTE with LVEF 45-55%, RV function mildly reduced and trivial pericardial effusion stable oxygenation. Most recent TTE showing normal RV size/function and improved LV function as well.   - Bridged to therapeutic INR, stopped heparin 9/25/19  - Dobutamine stopped on 9/23 AM after Pt went into Afib with RVR that  converted at amio bolus. Given RV improvement, no need for this further.  -Continue IV diuresis for today, orals starting tomorrow   Pulmonary: PEA arrest secondary to massive PE. History of heavy smoking. s/p thrombectomy and catheter directed lytics 9/9-9/10.  CT Chest with contrast 9/21 no PE, no RV strain.  LLL atelectasis. Less pleural effusions.  Extubated 9/22.  - Continue prednisone 40 mg daily x5 days (complete) for COPD exacerbation in the setting of pneumonia   - Scheduled duonebs   - Weaning oxygen as able   GI and Nutrition: No known medical hx.    - Nutrition consult   - GI Prophylaxis: PPI    Renal, Fluid and Electrolytes: # Volume overloaded  - Diuresis with Lasix 60 mg IV daily with goal net negative 0.5-1L daily. To transition to orals tomorrow 9/26/19  - 9/24/19 unreported output due to incontinence, not oliguric   Infectious Disease: Aspiration pneumonia: sputum culture MSSA  - Completed vancomycin/zosyn x5 days for ECMO, vanc last dose 9/15, cefazolin on 9/17. Went for VA ECMO decannulation, which was complicated by hypercarbic respiratory failure, and was therefore transitioned to VV ECMO.  - CXR with pulmonary edema got 1.5 L of crystalloid in the OR and two units of product. Restarted diureses and broadened back to pip/tazo with repeat cultures.   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 Likely from consumption. Stable/improving   Endocrinology: No known medical history. BG elevated.  --insulin gtt   Lines: R radial arterial line September 9, 2019  RIJ Sept 9 2019  ETT September 9, 2019  Underwood catheter September 9, 2019  OG tube September 9, 2019  Restraint: needed    Current lines are required for patient management      Code Status: FULL    Patient discussed and seen with Dr. Jose David Campuzano, PAC  CSI  [id number]      Interval History   No acute  "events overnight. Patient with minimal oxygen requirements. No specific complains this morning other than overall feeling very deconditioned. Up to floor without complication. INR therapeutic, stopping heparin. Start planning for TCU/ARU.     ROS: A 4-point ROS was otherwise negative except as above.    Data reviewed today: I reviewed all new labs and imaging results over the last 24 hours. I personally reviewed:    Physical Exam   Temp: 98.5  F (36.9  C) Temp src: Oral BP: 108/60 Pulse: 88 Heart Rate: 86 Resp: 20 SpO2: 95 % O2 Device: Nasal cannula Oxygen Delivery: 2 LPM  Vitals:    09/20/19 0530 09/21/19 0400 09/24/19 0600   Weight: (!) 176.5 kg (389 lb 1.8 oz) (!) 176 kg (388 lb 0.2 oz) (!) 172 kg (379 lb 3.1 oz)     Vital Signs with Ranges  Temp:  [98.2  F (36.8  C)-98.6  F (37  C)] 98.5  F (36.9  C)  Pulse:  [88-93] 88  Heart Rate:  [] 86  Resp:  [12-28] 20  BP: ()/(60-85) 108/60  SpO2:  [92 %-98 %] 95 %  I/O last 3 completed shifts:  In: 1235.21 [P.O.:480; I.V.:755.21]  Out: 625 [Urine:625]    Heart Rate: 86, Blood pressure 108/60, pulse 88, temperature 98.5  F (36.9  C), temperature source Oral, resp. rate 20, height 1.676 m (5' 5.98\"), weight (!) 172 kg (379 lb 3.1 oz), SpO2 95 %, not currently breastfeeding.  379 lbs 3.06 oz  GEN: Alert, interactive, NAD, morbidly obese  CV: Regular rate and rhythm, distant heart sounds  LUNGS: Diminished breath sounds with bibasilar crackles  ABD: Active bowel sounds, soft, non-tender/non-distended. No rebound/guarding/rigidity  EXT: No pitting edema or cyanosis, ECMO decannulation site is c/d/i without hematoma, wounds without fluctuance or spreading erythema.  SKIN: Warm and dry, no lesions on exposed surfaces    Medications     IV fluid REPLACEMENT ONLY 65 mL/hr at 09/22/19 6201     IV fluid REPLACEMENT ONLY       HEParin 2,750 Units/hr (09/25/19 0618)     - MEDICATION INSTRUCTIONS -       - MEDICATION INSTRUCTIONS -       - MEDICATION INSTRUCTIONS -       " Warfarin Therapy Reminder         atorvastatin  40 mg Oral QPM     cefTRIAXone  2 g Intravenous Q24H     furosemide  60 mg Intravenous Daily     insulin aspart  1-6 Units Subcutaneous Q4H     miconazole   Topical BID     multivitamin w/minerals  1 tablet Oral or Feeding Tube Daily     pantoprazole (PROTONIX) IV  40 mg Intravenous BID     sodium chloride (PF)  3 mL Intracatheter Q8H     vitamin B1  100 mg Oral or Feeding Tube Daily       Data   Recent Labs   Lab 09/25/19  0602 09/24/19  0347 09/23/19  1231 09/23/19  0300  09/21/19  0324  09/20/19  2032   WBC 8.4 10.4  --  12.4*   < > 13.8*  --  15.4*   HGB 9.7* 9.5*  --  8.8*   < > 8.5*  --  8.6*   MCV 97 98  --  96   < > 95  --  94   PLT Platelets clumped, platelet count unavailable 57*  --  96*   < > 93*  --  79*   INR 2.08* 1.33*  --  1.34*   < > 1.20*  --  1.29*    144  --  147*   < > 147*  --  142   POTASSIUM 3.4 3.5 4.0 3.3*   < > 3.7   < > 4.1  4.3   CHLORIDE 111* 111*  --  111*   < > 106  --  104   CO2 26 26  --  31   < > 35*  --  33*   BUN 25 29  --  36*   < > 50*  --  52*   CR 0.80 0.77  --  0.85   < > 0.91  --  0.84   ANIONGAP 8 7  --  5   < > 6  --  5   MARIA INES 9.0 9.3  --  9.2   < > 9.2  --  9.1   GLC 76 84  --  98  98   < > 125*   < > 145*  145*   ALBUMIN 2.5* 2.5*  --  2.5*   < > 2.4*  --   --    PROTTOTAL 6.8 6.8  --  6.9   < > 6.6*  --   --    BILITOTAL 0.2 0.3  --  0.4   < > 0.4  --   --    ALKPHOS 71 74  --  76   < > 90  --   --    ALT 26 25  --  27   < > 31  --   --    AST 14 11  --  13   < > 19  --   --    TROPI  --   --   --  <0.015  --  <0.015  --  <0.015    < > = values in this interval not displayed.       No results found for this or any previous visit (from the past 24 hour(s)).         ATTENDING NOTE:  Patient has been seen and evaluated by me on 09/25/2019. I have reviewed the documentation above.  I have reviewed today's vital signs, medications, labs, and imaging results.  I have reviewed and edited, as necessary, the history,  review of systems, physical examination, and assessment and plan.  I have discussed my assessment and plan with the physician assistant.  I have seen and examined the patient today as part of a shared visit with Filiberto Campuzano PA-C.  Shira Rodriguez is a 59 year old female with risk factor profile (+) HTN, (-) DM, (+) hypercholesterolemia, (-) tobacco use, (-) fam Hx premature CAD, presented to [facility] on 9/9/19 with PEA arrest secondary to acute PE.  She was resuscitated at [facility] and transferred to [facility] for ECMO and pulmonary thrombectomy and catheter-directed thrombolytics.  She was on V-A ECMO until 9/17/19.  She had hypercarbic respiratory failure and she was converted to V-V ECMO until 9/19/19.  She was extubated on 9/22/19 and subsequently transferred to telemetry.  She was treated with IV Vanco and IV Zosyn for MSSA and is now on Ceftriaxone for Serratia in sputum.  No renal failure, diuresing.  She developed A Fib / A Flutter (had occurred earlier in hospitalization) and was given IV Amiodarone bolus today.  No recurrent arrhythmias.  No GI issues.  She is back on IV UFH and getting Coumadin.  We are working on TCU at this time.  Examo documented above.        Brayan Main MD     Cardiovascular Division

## 2019-09-25 NOTE — PROGRESS NOTES
WOC Nurse Inpatient Wound Assessment   Reason for consultation: Evaluate and treat Pannus wound     Assessment  Pannus wound due to Intertrigo  Status: initial assessment    Treatment Plan  Pannus wound: BID   1. Cleanse with gentle soap and water  2. Apply Xeroform (#[id number]) to open wound only  3. Cover with ABD or Exudry      Orders Written  Recommended provider order: NA  WOC Nurse follow-up plan:weekly  Nursing to notify the Provider(s) and re-consult the WOC Nurse if wound(s) deteriorates or new skin concern.    Patient History  According to provider note(s):  Shira Rodriguez is a 59 F with a history of hypertension, hyperlipidemia, sleep apnea, morbid obesity, and chronic bronchitis who presented to [facility] with massive PE with following PEA arrest 9/9 and ROSC after intubation/resusitation. Transferred here for VA ECMO thrombectomy and catheter directed lytics on 9/9, went for VA ECMO decannulation on 9/17 complicated by hypercarbic respiratory failure s/p VV ECMO through RIJ-FV bypass s/p decannulation on 9/19. Repeat CT with no residual clot in PA. RV function improved to normal. She is being bridged to a therapeutic INR and has minimal oxygen requirements at this stage.     Objective Data  Containment of urine/stool: Incontinence Protocol    Active Diet Order  Orders Placed This Encounter      Regular Diet Adult      Output:   I/O last 3 completed shifts:  In: 1235.21 [P.O.:480; I.V.:755.21]  Out: 625 [Urine:625]    Risk Assessment:   Sensory Perception: 4-->no impairment  Moisture: 3-->occasionally moist  Activity: 2-->chairfast  Mobility: 2-->very limited  Nutrition: 3-->adequate  Friction and Shear: 2-->potential problem  Ole Score: 16                          Labs:   Recent Labs   Lab 09/25/19  0602  09/21/19  0324   ALBUMIN 2.5*   < > 2.4*   HGB 9.7*   < > 8.5*   INR 2.08*   < > 1.20*   WBC 8.4   < > 13.8*   CRP  --   --  75.0*    < > = values in this interval not displayed.       Physical  Exam  Skin inspection: focused Pannus    Wound Location:  Pannus       Left Pannus                                                          Right Pannus    Date of last photo 9/25/19    Wound History: Pt obese with excessive moisture/sweat in deep folds.   Measurements (length x width x depth, in cm)Left pannus 0.3cm x 3.4cm x 0.1cm   Right pannus 0.2 cm x 11 cm  x  0.1 cm   Wound Base: 100 % pink/tan moist tissue  Tunneling N/A  Undermining N/A  Palpation of the wound bed: normal   Periwound skin: intact  Color: normal and consistent with surrounding tissue  Temperature: normal   Drainage:, scant  Description of drainage: serous  Odor: none  Pain: denies , none    Interventions  Current support surface: Bariatric Atmos Air mattress  Current off-loading measures: Pillows under calves  Visual inspection of wound(s) completed  Wound Care: completed by RN  Supplies: ordered: Xeroform  Education provided: plan of care  Discussed plan of care with Patient and Nurse    Kevin Quinones RN BSN CWOCN

## 2019-09-25 NOTE — PROGRESS NOTES
Patient arrived to  from  on Bariatric bed accompanied by ICU RN and a NST.  Personal white bag, glasses and cell-phone accounted for.

## 2019-09-25 NOTE — PROGRESS NOTES
Transferred to: 6C at  2220  Status at time of transfer:  2L NC, VSS, heparin gtt  Belongings: yes  Underwood removed? (if no, why?): margarito  Chart and medications: yes  Family notified: by patient

## 2019-09-26 ENCOUNTER — TELEPHONE (OUTPATIENT)
Dept: FAMILY MEDICINE | Facility: CLINIC | Age: 59
End: 2019-09-26

## 2019-09-26 ENCOUNTER — APPOINTMENT (OUTPATIENT)
Dept: PHYSICAL THERAPY | Facility: CLINIC | Age: 59
DRG: 003 | End: 2019-09-26
Payer: COMMERCIAL

## 2019-09-26 LAB
ALBUMIN SERPL-MCNC: 2.5 G/DL (ref 3.4–5)
ALP SERPL-CCNC: 65 U/L (ref 40–150)
ALT SERPL W P-5'-P-CCNC: 23 U/L (ref 0–50)
ANION GAP SERPL CALCULATED.3IONS-SCNC: 9 MMOL/L (ref 3–14)
AST SERPL W P-5'-P-CCNC: 14 U/L (ref 0–45)
BACTERIA SPEC CULT: NO GROWTH
BACTERIA SPEC CULT: NO GROWTH
BILIRUB SERPL-MCNC: 0.3 MG/DL (ref 0.2–1.3)
BUN SERPL-MCNC: 20 MG/DL (ref 7–30)
CALCIUM SERPL-MCNC: 8.7 MG/DL (ref 8.5–10.1)
CHLORIDE SERPL-SCNC: 109 MMOL/L (ref 94–109)
CO2 SERPL-SCNC: 25 MMOL/L (ref 20–32)
CREAT SERPL-MCNC: 0.78 MG/DL (ref 0.52–1.04)
ERYTHROCYTE [DISTWIDTH] IN BLOOD BY AUTOMATED COUNT: 16.3 % (ref 10–15)
GFR SERPL CREATININE-BSD FRML MDRD: 84 ML/MIN/{1.73_M2}
GLUCOSE SERPL-MCNC: 90 MG/DL (ref 70–99)
HCT VFR BLD AUTO: 31.3 % (ref 35–47)
HGB BLD-MCNC: 9.4 G/DL (ref 11.7–15.7)
INR PPP: 1.02 (ref 0.86–1.14)
INR PPP: 1.37 (ref 0.86–1.14)
LMWH PPP CHRO-ACNC: <0.1 IU/ML
Lab: NORMAL
Lab: NORMAL
MAGNESIUM SERPL-MCNC: 2.4 MG/DL (ref 1.6–2.3)
MCH RBC QN AUTO: 29 PG (ref 26.5–33)
MCHC RBC AUTO-ENTMCNC: 30 G/DL (ref 31.5–36.5)
MCV RBC AUTO: 97 FL (ref 78–100)
PHOSPHATE SERPL-MCNC: 3.1 MG/DL (ref 2.5–4.5)
PLATELET # BLD AUTO: 116 10E9/L (ref 150–450)
POTASSIUM SERPL-SCNC: 3.4 MMOL/L (ref 3.4–5.3)
PROT SERPL-MCNC: 6.7 G/DL (ref 6.8–8.8)
RBC # BLD AUTO: 3.24 10E12/L (ref 3.8–5.2)
SODIUM SERPL-SCNC: 143 MMOL/L (ref 133–144)
SPECIMEN SOURCE: NORMAL
SPECIMEN SOURCE: NORMAL
T4 FREE SERPL-MCNC: 1.2 NG/DL (ref 0.76–1.46)
TSH SERPL DL<=0.005 MIU/L-ACNC: 5.1 MU/L (ref 0.4–4)
WBC # BLD AUTO: 9.1 10E9/L (ref 4–11)

## 2019-09-26 PROCEDURE — 85610 PROTHROMBIN TIME: CPT | Performed by: PHYSICIAN ASSISTANT

## 2019-09-26 PROCEDURE — 85610 PROTHROMBIN TIME: CPT | Performed by: INTERNAL MEDICINE

## 2019-09-26 PROCEDURE — 80053 COMPREHEN METABOLIC PANEL: CPT | Performed by: INTERNAL MEDICINE

## 2019-09-26 PROCEDURE — 25000128 H RX IP 250 OP 636: Performed by: SURGERY

## 2019-09-26 PROCEDURE — 25000132 ZZH RX MED GY IP 250 OP 250 PS 637: Performed by: SURGERY

## 2019-09-26 PROCEDURE — 84100 ASSAY OF PHOSPHORUS: CPT | Performed by: INTERNAL MEDICINE

## 2019-09-26 PROCEDURE — 85520 HEPARIN ASSAY: CPT | Performed by: INTERNAL MEDICINE

## 2019-09-26 PROCEDURE — 85027 COMPLETE CBC AUTOMATED: CPT | Performed by: PHYSICIAN ASSISTANT

## 2019-09-26 PROCEDURE — 25000132 ZZH RX MED GY IP 250 OP 250 PS 637: Performed by: PHYSICIAN ASSISTANT

## 2019-09-26 PROCEDURE — C9113 INJ PANTOPRAZOLE SODIUM, VIA: HCPCS | Performed by: SURGERY

## 2019-09-26 PROCEDURE — 83735 ASSAY OF MAGNESIUM: CPT | Performed by: INTERNAL MEDICINE

## 2019-09-26 PROCEDURE — 25000128 H RX IP 250 OP 636: Performed by: PHYSICIAN ASSISTANT

## 2019-09-26 PROCEDURE — 36415 COLL VENOUS BLD VENIPUNCTURE: CPT | Performed by: INTERNAL MEDICINE

## 2019-09-26 PROCEDURE — 84443 ASSAY THYROID STIM HORMONE: CPT | Performed by: INTERNAL MEDICINE

## 2019-09-26 PROCEDURE — 99233 SBSQ HOSP IP/OBS HIGH 50: CPT | Performed by: INTERNAL MEDICINE

## 2019-09-26 PROCEDURE — 97530 THERAPEUTIC ACTIVITIES: CPT | Mod: GP | Performed by: PHYSICAL THERAPIST

## 2019-09-26 PROCEDURE — 85027 COMPLETE CBC AUTOMATED: CPT | Performed by: INTERNAL MEDICINE

## 2019-09-26 PROCEDURE — 25000132 ZZH RX MED GY IP 250 OP 250 PS 637: Performed by: INTERNAL MEDICINE

## 2019-09-26 PROCEDURE — 25000132 ZZH RX MED GY IP 250 OP 250 PS 637: Performed by: STUDENT IN AN ORGANIZED HEALTH CARE EDUCATION/TRAINING PROGRAM

## 2019-09-26 PROCEDURE — 40000141 ZZH STATISTIC PERIPHERAL IV START W/O US GUIDANCE

## 2019-09-26 PROCEDURE — 84439 ASSAY OF FREE THYROXINE: CPT | Performed by: INTERNAL MEDICINE

## 2019-09-26 PROCEDURE — 21400000 ZZH R&B CCU UMMC

## 2019-09-26 RX ORDER — FUROSEMIDE 10 MG/ML
20 INJECTION INTRAMUSCULAR; INTRAVENOUS ONCE
Status: COMPLETED | OUTPATIENT
Start: 2019-09-26 | End: 2019-09-26

## 2019-09-26 RX ORDER — PANTOPRAZOLE SODIUM 40 MG/1
40 TABLET, DELAYED RELEASE ORAL
Status: DISCONTINUED | OUTPATIENT
Start: 2019-09-26 | End: 2019-10-07 | Stop reason: HOSPADM

## 2019-09-26 RX ORDER — HEPARIN SODIUM 10000 [USP'U]/100ML
0-3500 INJECTION, SOLUTION INTRAVENOUS CONTINUOUS
Status: DISCONTINUED | OUTPATIENT
Start: 2019-09-26 | End: 2019-09-29

## 2019-09-26 RX ORDER — WARFARIN SODIUM 7.5 MG/1
7.5 TABLET ORAL
Status: COMPLETED | OUTPATIENT
Start: 2019-09-26 | End: 2019-09-26

## 2019-09-26 RX ADMIN — ACETAMINOPHEN 650 MG: 325 TABLET, FILM COATED ORAL at 11:31

## 2019-09-26 RX ADMIN — Medication 12.5 MG: at 07:53

## 2019-09-26 RX ADMIN — DICLOFENAC 2 G: 10 GEL TOPICAL at 19:21

## 2019-09-26 RX ADMIN — ATORVASTATIN CALCIUM 40 MG: 40 TABLET, FILM COATED ORAL at 19:21

## 2019-09-26 RX ADMIN — CEFTRIAXONE SODIUM 2 G: 2 INJECTION, POWDER, FOR SOLUTION INTRAMUSCULAR; INTRAVENOUS at 14:26

## 2019-09-26 RX ADMIN — Medication 12.5 MG: at 19:21

## 2019-09-26 RX ADMIN — MICONAZOLE NITRATE: 20 POWDER TOPICAL at 08:06

## 2019-09-26 RX ADMIN — ACETAMINOPHEN 650 MG: 325 TABLET, FILM COATED ORAL at 05:40

## 2019-09-26 RX ADMIN — OXYCODONE HYDROCHLORIDE 5 MG: 5 TABLET ORAL at 11:31

## 2019-09-26 RX ADMIN — PANTOPRAZOLE SODIUM 40 MG: 40 TABLET, DELAYED RELEASE ORAL at 15:38

## 2019-09-26 RX ADMIN — WARFARIN SODIUM 7.5 MG: 7.5 TABLET ORAL at 17:02

## 2019-09-26 RX ADMIN — OXYCODONE HYDROCHLORIDE 5 MG: 5 TABLET ORAL at 05:40

## 2019-09-26 RX ADMIN — POTASSIUM CHLORIDE 20 MEQ: 750 TABLET, EXTENDED RELEASE ORAL at 07:53

## 2019-09-26 RX ADMIN — FUROSEMIDE 20 MG: 10 INJECTION, SOLUTION INTRAVENOUS at 16:56

## 2019-09-26 RX ADMIN — OXYCODONE HYDROCHLORIDE 5 MG: 5 TABLET ORAL at 16:56

## 2019-09-26 RX ADMIN — THIAMINE HCL TAB 100 MG 100 MG: 100 TAB at 07:53

## 2019-09-26 RX ADMIN — HEPARIN SODIUM 1200 UNITS/HR: 10000 INJECTION, SOLUTION INTRAVENOUS at 09:09

## 2019-09-26 RX ADMIN — PANTOPRAZOLE SODIUM 40 MG: 40 INJECTION, POWDER, FOR SOLUTION INTRAVENOUS at 07:53

## 2019-09-26 RX ADMIN — MULTIPLE VITAMINS W/ MINERALS TAB 1 TABLET: TAB at 07:53

## 2019-09-26 ASSESSMENT — ACTIVITIES OF DAILY LIVING (ADL)
ADLS_ACUITY_SCORE: 17
ADLS_ACUITY_SCORE: 15

## 2019-09-26 ASSESSMENT — PAIN DESCRIPTION - DESCRIPTORS
DESCRIPTORS: SORE
DESCRIPTORS: SORE

## 2019-09-26 NOTE — PLAN OF CARE
D AVSS with sat's 94% on oxygen set to 1.5L/min via nasal cannula. Heart regular and has been in sinus all night with lungs sounds decreased with upper respiratory congestion. Loose not productive cough with c/o pain during coughing and I/S use. Tylenol with Oxycodone given with relief. Patient remains a/o x 4 and follows instructions easily. Sitting outside of patients room and heard her yell my name. Entered room to find patient standing at bedside and holding onto commode. Assisted patient to turn and sit on commode which she tolerated well. Reminded her ask for help when she needs to get out of bed. Attempted to put bed alarm on but it is not working. Ordered bed alarm for safety. Voiding good amounts.  I Vital's, assessment and med's per order. NPO for possible LETA/DCCV today.   A Resting in bed with call light in reach.   P Continue to monitor and update MD with changes.

## 2019-09-26 NOTE — TELEPHONE ENCOUNTER
[facility] Call Center    Phone Message    May a detailed message be left on voicemail: yes    Reason for Call: Other: the patient wnated to speak to care team to go over her medications for antidepressants and other health concerns.      Action Taken: Message routed to:  [facility] ([facility]): rob

## 2019-09-26 NOTE — PLAN OF CARE
INR 1.37 with a.m labs-recheck level 1.02. New piv obtained and heparin gtt restarted @ 1200u/hr. Recheck 10a at 15:00. Continue with coumadin this pippa.  Pt up to BSC w/assist of 2 & walker. Able to bear weight and take a few small steps before becoming very fatigued.  Weaned O2 off this morning, but since more activity this afternoon, she has required 2L NC to maintain sats.  Continuing w/tylenol & oxycodone for chest pain from CPR. Harsh productive cough at times which can cause significant pain in chest. Pt using IS and acapella independently. IV abx daily.  K 3.4-replaced per protocol.  Has maintained NSR since converting last evening from Afib. Continue to monitor.

## 2019-09-26 NOTE — PROGRESS NOTES
[facility]   Cardiology Consults  Progress Note     Interval History:  - INR subtherapeutic today. Will resume heparin  - Went into atrial flutter with variable block yesterday afternoon. Got amio bolus, fluid, and IV metop x1. Was asymptomatic with rates ~130. Spontaneously converted shortly after    Physical Exam:  Temp:  [97.6  F (36.4  C)-98.8  F (37.1  C)] 98.8  F (37.1  C)  Pulse:  [] 86  Heart Rate:  [] 82  Resp:  [18-22] 18  BP: (101-125)/(52-86) 122/69  SpO2:  [86 %-100 %] 96 %      Intake/Output Summary (Last 24 hours) at 9/26/2019 0931  Last data filed at 9/26/2019 0800  Gross per 24 hour   Intake 2182.29 ml   Output 1200 ml   Net 982.29 ml       Wt:   Wt Readings from Last 5 Encounters:   09/24/19 (!) 172 kg (379 lb 3.1 oz)   09/09/19 (!) 178.6 kg (393 lb 11.9 oz)   08/12/19 (!) 181.4 kg (400 lb)   06/26/19 (!) 191 kg (421 lb)   05/23/19 (!) 195.9 kg (431 lb 14.4 oz)     GEN: NAD, awake, alert  Pulm: Seemingly CTAB, no cough, no wheeze  Cardiac: JVP difficult to assess secondary to body habitus, no murmurs, appears RRR  Vascular: Trace lower extremity edema and palpable pulses  GI: soft, non distended, protuberant abdomen   Ext: ECMO site c/d/i, no hematoma. Bilateral panus wounds with minimal clear drainage.   Neuro: CN II-XII grossly intact    Medications:    atorvastatin  40 mg Oral QPM     cefTRIAXone  2 g Intravenous Q24H     influenza vaccine adult (product based on age)  0.5 mL Intramuscular Prior to discharge     metoprolol tartrate  12.5 mg Oral BID     miconazole   Topical BID     multivitamin w/minerals  1 tablet Oral or Feeding Tube Daily     pantoprazole (PROTONIX) IV  40 mg Intravenous BID     sodium chloride (PF)  3 mL Intracatheter Q8H     vitamin B1  100 mg Oral or Feeding Tube Daily       IV fluid REPLACEMENT ONLY 65 mL/hr at 09/22/19 2155     IV fluid REPLACEMENT ONLY       HEParin 1,200 Units/hr (09/26/19 0909)     - MEDICATION INSTRUCTIONS  -       - MEDICATION INSTRUCTIONS -       - MEDICATION INSTRUCTIONS -       Warfarin Therapy Reminder         Labs:   CMP  Recent Labs   Lab 09/26/19  0655 09/25/19  1606 09/25/19  0602 09/24/19 0347 09/23/19  0300  09/22/19  1549     --  144 144  --  147*  --   --    POTASSIUM 3.4 4.1 3.4 3.5   < > 3.3*  --  4.0   CHLORIDE 109  --  111* 111*  --  111*  --   --    CO2 25  --  26 26  --  31  --   --    ANIONGAP 9  --  8 7  --  5  --   --    GLC 90  --  76 84  --  98  98   < > 131*   BUN 20  --  25 29  --  36*  --   --    CR 0.78  --  0.80 0.77  --  0.85  --   --    GFRESTIMATED 84  --  82 85  --  75  --   --    GFRESTBLACK >90  --  >90 >90  --  87  --   --    MARIA INES 8.7  --  9.0 9.3  --  9.2  --   --    MAG 2.4* 2.2 2.5* 2.7*  --  2.7*   < > 2.8*   PHOS 3.1  --  3.7 3.8  --   --   --  3.9   PROTTOTAL 6.7*  --  6.8 6.8  --  6.9  --   --    ALBUMIN 2.5*  --  2.5* 2.5*  --  2.5*  --   --    BILITOTAL 0.3  --  0.2 0.3  --  0.4  --   --    ALKPHOS 65  --  71 74  --  76  --   --    AST 14  --  14 11  --  13  --   --    ALT 23  --  26 25  --  27  --   --     < > = values in this interval not displayed.     CBC  Recent Labs   Lab 09/26/19  0655 09/25/19 0602 09/24/19 0347 09/23/19  0300   WBC 9.1 8.4 10.4 12.4*   RBC 3.24* 3.38* 3.29* 3.06*   HGB 9.4* 9.7* 9.5* 8.8*   HCT 31.3* 32.8* 32.3* 29.4*   MCV 97 97 98 96   MCH 29.0 28.7 28.9 28.8   MCHC 30.0* 29.6* 29.4* 29.9*   RDW 16.3* 16.0* 15.8* 15.7*   * Platelets clumped, platelet count unavailable 57* 96*     INR  Recent Labs   Lab 09/26/19  0725 09/26/19  0655 09/25/19  0602 09/24/19  0347   INR 1.02 1.37* 2.08* 1.33*     Arterial Blood Gas  Recent Labs   Lab 09/23/19  1231 09/23/19  0300 09/22/19  2130 09/22/19  1549   PH 7.47* 7.44 7.44 7.46*   PCO2 44 48* 49* 47*   PO2 57* 75* 71* 73*   HCO3 32* 33* 33* 33*   O2PER 3L 3L 3.5L 30       Diagnostics:    Echo 9/22/2019:  Interpretation Summary  Technically difficult study.  Left ventricular function, chamber  size, wall motion, and wall thickness are  normal.The EF is 55-60%.  Right ventricular function, chamber size, wall motion, and thickness are  normal.  No pericardial effusion is present.  _____________________________________________________________________________    Left Ventricle  Left ventricular function, chamber size, wall motion, and wall thickness are  normal.The EF is 55-60%.     Right Ventricle  Right ventricular function, chamber size, wall motion, and thickness are  normal.     Mitral Valve  Mild to moderate mitral annular calcification is present. Trace mitral  insufficiency is present.     Tricuspid Valve  Trace tricuspid insufficiency is present. The peak velocity of the tricuspid  regurgitant jet is not obtainable.        Vessels  The aorta root is normal. Unable to assess mean RA pressure given the patient  is on a ventilator.     Pericardium  No pericardial effusion is present.    Stress test:    Coronary angiogram:    ASSESSMENT/PLAN:  Shira Rodriguez is a 59 F with a history of hypertension, hyperlipidemia, sleep apnea, morbid obesity, and chronic bronchitis who presented to [facility] with massive PE with following PEA arrest 9/9 and ROSC after intubation/resusitation.    #PEA arrest secondary to massive pulmonary embolism  ##Hypoxic respiratory failure   ###Volume overload  Transferred to [facility] for VA ECMO, and IR thrombectomy and catheter directed lytics on 9/9, went for VA ECMO decannulation on 9/17 complicated by hypercarbic respiratory failure requiring placement of VV ECMO through RIJ-FV bypass. Now VV ECMO s/p decannulation on 9/19. Off sedation 9/22, extubated 9/22. Repeat CT with no residual clot in PA. RV function improved to normal. She is being bridged to a therapeutic INR and has minimal oxygen requirements at this stage with diuresis.   - Left groin incision with retention sutures. Should be removed 9/30/2019. Will need wound care order at discharge if she leaves before then  -  Warfarin, goal INR 2-3. Pharmacy to dose  - Resume heparin gtt given subtherapeutic INR today  - Duonebs PRN  - Acapella qid   - Continue to wean oxygen as able  - hold additional lasix for now    #Afib/Aflutter  Noted to be in atrial fibrillation while in the ICU and on dobutamine, which was thought to be exacerbating factor. Dobutamine discontinued 9/23 and patient was given amiodarone bolus at that time. She converted to sinus. On 9/25 she went into atrial flutter with variable block. Again was given an amio bolus, fluid bolus, and IV metop x1. Now NSR  - Start metoprolol tartrate 12.5mg bid  - warfarin as above    #COPD  ##History of tobacco abuse  ###Aspiration pneumonia   Aspiration pneumonia:   - 9/11 sputum culture MSSA    - 9/20 Serratia (ceftriaxone susceptible)  - Completed vancomycin/zosyn x5 days for ECMO, vanc last dose 9/15, cefazolin on 9/17.  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 Likely from consumption, now improving.  - CBC daily    #HTN   - metoprolol as above  - Will resume PTA lisinopril pending BP    #HLD  #CYRUS   - Oxygen at night PRN to keep O2 sats >90%.   - Sleep study referral at discharge    Patient seen and discussed with Dr. Main, who agrees with above plan.      Torsten Foster PA-C  [facility] Cardiology Team          ATTENDING NOTE:  Patient has been seen and evaluated by me on 09/26/2019. I have reviewed the documentation above.  I have reviewed today's vital signs, medications, labs, and imaging results.  I have reviewed and edited, as necessary, the history, review of systems, physical examination, and assessment and plan.  I have discussed my assessment and plan with the physician assistant.  I have seen and examined the patient today as part of a shared visit with Filiberto Campuzano PA-C.  Shira Rodriguez is a 59 year old female with risk factor profile (+) HTN, (-) DM, (+) hypercholesterolemia, (-)  tobacco use, (-) fam Hx premature CAD, presented to [facility] on 9/9/19 with PEA arrest secondary to acute PE.  She was resuscitated at [facility] and transferred to [facility] for ECMO and pulmonary thrombectomy and catheter-directed thrombolytics.  She was on V-A ECMO until 9/17/19.  She had hypercarbic respiratory failure and she was converted to V-V ECMO until 9/19/19.  She was extubated on 9/22/19 and subsequently transferred to telemetry.  She was treated with IV Vanco and IV Zosyn for MSSA and is now on Ceftriaxone for Serratia in sputum.  No renal failure, diuresing.  She developed A Fib / A Flutter (had occurred earlier in hospitalization) and was given IV Amiodarone bolus yesterday.  No recurrent arrhythmias.  No GI issues. CBC OK.   INR 1.0.  She is back on IV UFH and getting Coumadin.  We are working on TCU at this time.  Examo documented above.        Brayan Main MD     Cardiovascular Division

## 2019-09-26 NOTE — PROGRESS NOTES
Calorie Count  Intake recorded for: 9/25  Total Kcals: 487 Total Protein: 24g  Kcals from Hospital Food: 487  Protein: 24g  Kcals from Outside Food (average):0 Protein: 0g  # Meals Recorded: 100% scrambled eggs, sausage lauren, orange juice  # Supplements Recorded: 0

## 2019-09-26 NOTE — PLAN OF CARE
5A: Discharge Planner PT   Patient plan for discharge: tentatively agreeable to rehab if needed   Current status: pt completes STS with mod A x 2 and FWW, very short tolerance for standing due to fatigue, max A x 2 for return to supine, lift needed for boosting and repositioning in bed. Pt using 4L O2 with mobility, 2L O2 with rest. Pt limited by chest pain throughout session (recent compressions).   Barriers to return to prior living situation: weakness, impaired activity tolerance, pain, medical status   Recommendations for discharge: rehab (ARU vs TCU pending progress)  Rationale for recommendations: pt far below her baseline mobility needing further therapy to maximize mobility and independence. Pending progress, pt may be good ARU candidate as she is IND at baseline and motivated to participate.        Entered by: Santa Tyler 09/26/2019 1:34 PM

## 2019-09-26 NOTE — DISCHARGE SUMMARY
[address] [address]  p: [phone]    Discharge Summary: Cardiology Service    Shira Rodriguez MRN# [id number]   YOB: 1960 Age: 59 year old       Admission Date: 9/9/2019  Discharge Date: 10/07/19      Discharge Diagnoses:  1. Massive Pulmonary embolism c/b PEA arrest  2. Acute Hypoxemic Respiratory Failure  3. Bilateral Pleural Effusions, L>R  4. Necrotic Left Lower Lobe on imaging, infection vs atelectasis  5. Volume Overload  6. Pannus Wound (right groin)  7. Atrial fibrillation/Atrial flutter  8. Sinus Pause  9. COPD  10. Aspiration Pneumonia   11. History of tobacco abuse   12. Thrombocytopenia   13. Hypertension   14. Hyperlipidemia   15. CYRUS   16. Rheumatoid Arthritis  17. MDD    Pertinent Procedures:  1. VA ECMO cannulation/decannulation 9/9-9/17  2. VV ECMO Cannulation/decannulation 9/17-9/19  3. Thoracentesis 10/4    Consults:  IR  Neurology  Pulmonology  Palliative Care  PT/OT  Dietician  RN Care Coordinator/ NANCY MICHELLE    Imaging with results:  Echo 9/22/2019:  Interpretation Summary  Technically difficult study.  Left ventricular function, chamber size, wall motion, and wall thickness are  normal.The EF is 55-60%.  Right ventricular function, chamber size, wall motion, and thickness are  normal.  No pericardial effusion is present.    Left Ventricle  Left ventricular function, chamber size, wall motion, and wall thickness are  normal.The EF is 55-60%.     Right Ventricle  Right ventricular function, chamber size, wall motion, and thickness are  normal.     Mitral Valve  Mild to moderate mitral annular calcification is present. Trace mitral  insufficiency is present.     Tricuspid Valve  Trace tricuspid insufficiency is present. The peak velocity of the tricuspid  regurgitant jet is not obtainable.      Vessels  The aorta root is normal. Unable to assess mean RA pressure given the patient  is on a  ventilator.     Pericardium  No pericardial effusion is present.    EKG9/28/19:  CT Chest 9/21/19:  Addendum: After further review, there is consolidation in the left  lower lobe with multiple foci of gas concerning for necrotic  Infection.    Chest Xray 10/5/19:  Impression:   1. Small bilateral pleural effusions with associated atelectasis,  unchanged.  2. Cardiomegaly and pulmonary vascular congestion unchanged.    Brief HPI:  Shira Rodriguez is a 59 F with a history of hypertension, hyperlipidemia, sleep apnea, morbid obesity, and chronic bronchitis who presented to [facility] with massive PE with following PEA arrest 9/9 and ROSC after intubation/resusitation.    Hospital Course by Diagnosis:  # PEA arrest secondary to massive pulmonary embolism  # Acute Hypoxemic Respiratory Failure, improved  # Bilateral Plueral Effusions, small L>R  # Nectrotic Left Lower Lobe on prior imaging, infection versus atelectasis  # Volume Overload, likely 2/2 acute RV failure (resolved) and ECMO resuscitation   Patient admitted to OSH for SOB, cyanotic and clammy on arrival, intubated. CT + for massive PE. Transferred to [facility] for VA ECMO, and IR thrombectomy and catheter directed lytics on 9/9, decannuled on 9/17 complicated by hypercarbic respiratory failure requiring placement of VV ECMO through RIJ-FV bypass. S/p VV ECMO decannulation 9/19. Off sedation and extubated 9/22. Repeat CT with no residual clot in PA on 09/21, but necrotic LLL, infection vs atelectasis. Patient has remained afebrile without leukocytosis and clinically improving since 9/21 arguing against a necrotizing infection. We did do a diagnostic tap 10/4, likely transudate pleural effusion 2/2 acute PH with her acute massive PE, acute heart failure (gradually improving), and deconditioning due to prolonged ICU stay with hypoalbuminemia.INR theraputic as of 09/30/2019. She still intermittently requiring some oxygen, 2-4L with activity/sleep. This is likely  "mutli-factorial and due to her effusion, her obesity, and significant deconditioning.  Her estimated dry weight is unclear and she is a difficult exam but did receive copious fluid resuscitation during her ICU stay, now diuresed approximately 30 lbs with gradual improvement in her oxygenation. Appears euvolemic today, now on PO diuretics. Serial CXRs showed pulmonary edema, albeit improving as compared to previous exams, as well as a persistent L>R small pleural effusion. She will continue to need oral diuresis and pulmonary rehab with incentive spirometry.   - Lasix 80 mg PO daily   - IS per nursing   - Coumadin with goal INR 2-3. INR 2.22 today. Would recommend lifelong.   - Duonebs PRN; Acapella qid   - Wean oxygen as able  - Patient previously diagnosed with CYRUS, having difficulty wearing CPAP (see below), should follow up with PCP in 1 week, possibly readdress need to see Sleep Specialist    #Pannus wound (right side and right groin)  Patient with dehisced left groin cannula wound, staples removed 10/1 with dehiscence shortly after. WOC following, appreciate their recs:     Daily    1. Cleanse with gentle soap and water  2. Apply Xeroform (#081791) to open wound only  3. Cover with ABD if needed for drainage       L) groin- Remove old dressing very gently.    Cleanse with Microklenz and pat dry    Cut a piece of polymem (#[id number]) to fit into the wound bed and place it. Ensure to cover entire wound bed.    Cover with 4x4 gauze or ABD depending upon drainage.    Change dressing every day.       Abdominal pannus only around intact skin-Cleanse the area and dry thoroughly daily.    Cut a piece of interdry (#026107) and place it as a single layer and date it.     Ensure to leave at least 2\" of tail beyond the fold to promote moisture wicking.    May leave same interdry for 5 days if not soiled.  DO not use this product with any other creams or powder.     # Sinus Pause  Noted overnight 09/29 while sleeping; length " of 3.6 seconds.  Discussed with EP; no need for acute intervention. Will stop BB and plan to discharge the patient on monitor. Per patient, with previous sleep study she was also noted to have a significant pause while on CPAP. She has a CPAP at home but tolerates it only for an hour or two, she also notes that she has tried different face masks.   - Ziopatch x14 days at discharge  - EP consult; appreciate formal recommendations  - Follow up with sleep specialist after discharge to home as listed above    # Afib/Aflutter  Noted to be in atrial fibrillation while in the ICU and on dobutamine, which was thought to be exacerbating factor. Dobutamine discontinued 9/23 and patient was given amiodarone bolus at that time. She converted to sinus. On 9/25 she went into atrial flutter with variable block. Again was given an amio bolus, fluid bolus, and IV metop x1. Continues to intermittently go into a-fib - rates now controlled. Has been in sinus rhythm last several days.  Seen by EP 09/30 given sinus pause; see recommendations as above  - holding BB due to sinus pause  - warfarin as above    # Acute Thrombocytopenia (resolved)   Thrombocytopenia 60-80k baseline 180k. Had precipitous drop in platelets after admission - suspected due to consumption. 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 Has stabilized.     #COPD  ##History of tobacco abuse  ###Aspiration pneumonia, resolved  Sputum cultures:  - 9/11 MSSA: Completed vancomycin/zosyn x5 days for ECMO, vanc last dose 9/15  - 9/20 Serratia (ceftriaxone susceptible): cefazolin 9/17-9/22, Ceftriaxone 9/23-9/28  - s/p prednisone burst     # HTN   BP stable, 110-120/60-70's  - Lasix 80 mg daily as listed above  - Continue 5mg lisinopril, can be titrated as OP  - discontinue PTA spironolactone    # History of depression  Cymbalta held while in ICU. Resumed at lower dose over next few days and increased to PTA dose. Additionally reports that she may have been on Wellbutrin,  however, notes regarding this drug state she had increased crying episodes while on this medication and it appears to have been stopped.  - up titrated to home 60mg Cymbalta     # HLD  - Atorvastatin 40mg     # CYRUS   Patient diagnosed with sleep apnea, but refuses to use BiPAP/CPAP at home due to mask discomfort.  - Oxygen at night PRN to keep O2 sats >88%.      # Rheumatoid Arthritis  - Resume PTA methotrexate/Wellcovorin     Condition on discharge  Temp:  [97.6  F (36.4  C)-98.8  F (37.1  C)] 98.8  F (37.1  C)  Pulse:  [] 86  Heart Rate:  [] 82  Resp:  [18-22] 18  BP: (101-125)/(52-86) 122/69  SpO2:  [87 %-100 %] 93 %  General: Alert, interactive, NAD  Eyes: sclera anicteric, EOMI  Neck: JVP flat, carotid 2+ bilaterally  Cardiovascular: regular rate and rhythm, normal S1 and S2, no murmurs, gallops, or rubs  Resp: clear to auscultation bilaterally, no rales, wheezes, or rhonchi  GI: Soft, nontender, nondistended. +BS.  No HSM or masses, no rebound or guarding.  Extremities: no edema, no cyanosis or clubbing, dorsalis pedis and posterior tibialis pulses 2+ bilaterally  Skin: Warm and dry, no jaundice or rash, left groin dressing intact, no bruising, redness, drainage around dressing, interdry underneath pannus to keep dry  Neuro: CN 2-12 intact, moves all extremities equally  Psych: Alert & oriented x 3      Medication Changes:  START Lasix 80 mg daily  START Lisinopril 5 mg daily  START Coumadin (INR Goal 2-3)  STOP Aldactone    Discharge medications:   Current Discharge Medication List      START taking these medications    Details   acetylcysteine (MUCOMYST) 20 % neb solution Take 2 mLs by nebulization every 4 hours as needed for mucolysis/respiratory distress    Associated Diagnoses: Pulmonary embolism with acute cor pulmonale, unspecified chronicity, unspecified pulmonary embolism type (H)      atorvastatin (LIPITOR) 40 MG tablet Take 1 tablet (40 mg) by mouth every evening    Associated Diagnoses:  Hyperlipidemia LDL goal <100      furosemide (LASIX) 80 MG tablet Take 1 tablet (80 mg) by mouth daily    Associated Diagnoses: Pulmonary embolism with acute cor pulmonale, unspecified chronicity, unspecified pulmonary embolism type (H); SANTOS (dyspnea on exertion)      multivitamin w/minerals (THERA-VIT-M) tablet 1 tablet by Oral or Feeding Tube route daily    Associated Diagnoses: Morbid obesity (H)      pantoprazole (PROTONIX) 40 MG EC tablet Take 1 tablet (40 mg) by mouth 2 times daily (before meals)    Associated Diagnoses: Gastroesophageal reflux disease with esophagitis      vitamin B1 (THIAMINE) 100 MG tablet 1 tablet (100 mg) by Oral or Feeding Tube route daily    Associated Diagnoses: Morbid obesity (H)      warfarin ANTICOAGULANT (COUMADIN) 5 MG tablet Take 1 tablet (5 mg) by mouth daily  Qty: 30 tablet    Associated Diagnoses: Pulmonary embolism with acute cor pulmonale, unspecified chronicity, unspecified pulmonary embolism type (H)         CONTINUE these medications which have CHANGED    Details   cyclobenzaprine (FLEXERIL) 10 MG tablet Take 1 tablet (10 mg) by mouth nightly as needed for muscle spasms  Qty: 30 tablet, Refills: 1    Associated Diagnoses: Lumbar radicular pain      folic acid (FOLVITE) 1 MG tablet Take 4 tablets (4 mg) by mouth daily Take 3-4 tablets daily  Qty: 360 tablet, Refills: 3    Associated Diagnoses: High risk medication use; Rheumatoid arthritis involving multiple sites with positive rheumatoid factor (H)      leucovorin (WELLCOVORIN) 5 MG tablet Take 1 tablet (5 mg) by mouth every 7 days *take 24h after taking weekly methotrexate dose  Qty: 4 tablet, Refills: 3    Associated Diagnoses: Adverse effect of methotrexate, initial encounter      lisinopril (PRINIVIL/ZESTRIL) 5 MG tablet Take 1.5 tablets (7.5 mg) by mouth daily    Associated Diagnoses: Essential hypertension      methotrexate 50 MG/2ML injection CHEMO Inject 1 mL (25 mg) Subcutaneous every 7 days Inject 0.8 mL (20 mg)  "weekly  Qty: 4 mL, Refills: 6    Associated Diagnoses: Rheumatoid arthritis involving both hands with positive rheumatoid factor (H)      methylPREDNISolone (MEDROL DOSEPAK) 4 MG tablet therapy pack Follow package instructions  Qty: 21 tablet, Refills: 0    Associated Diagnoses: SANTOS (dyspnea on exertion)      nystatin (MYCOSTATIN) 442970 UNIT/GM external powder Apply to coat rash tid, neck and breast area rashes  Qty: 60 g, Refills: 3    Associated Diagnoses: Rash      varenicline (CHANTIX STARTING MONTH PAK) 0.5 MG X 11 & 1 MG X 42 tablet Take 0.5 mg by mouth daily AND 0.5 mg 2 times daily AND 1 mg 2 times daily.  Qty: 53 tablet, Refills: 0    Associated Diagnoses: Methotrexate, long term, current use; Shortness of breath; Tobacco abuse; Tobacco abuse counseling         CONTINUE these medications which have NOT CHANGED    Details   DULoxetine (CYMBALTA) 60 MG EC capsule TK ONE C PO  QAM  Refills: 0      insulin syringe-needle U-100 (BD INSULIN SYRINGE ULTRAFINE) 30G X 1/2\" 1 ML For methotrexate use weekly  Qty: 8 each, Refills: prn    Associated Diagnoses: Rheumatoid arthritis involving multiple sites with positive rheumatoid factor (H)         STOP taking these medications       spironolactone (ALDACTONE) 25 MG tablet Comments:   Reason for Stopping:               Labs or imaging requiring follow-up after discharge:  CT Chest 4-6 weeks    Follow-up:  PCP in 1 week  Cardiology clinic in 2-4 weeks with Dr. Mcfarlane    Code status:  Full    Savita Collier, APRN, CNP  [facility]  [phone]        I have seen and examined the patient with the CSI team. I agree with the assessment and plan of the discharge summary note above.I have reviewed pertinent labs. More than 30 minutes were spent organizing the patient's discharge.    Raffaele Stuart MD  Interventional Cardiology  Pager: [phone]    "

## 2019-09-27 ENCOUNTER — APPOINTMENT (OUTPATIENT)
Dept: PHYSICAL THERAPY | Facility: CLINIC | Age: 59
DRG: 003 | End: 2019-09-27
Payer: COMMERCIAL

## 2019-09-27 ENCOUNTER — APPOINTMENT (OUTPATIENT)
Dept: OCCUPATIONAL THERAPY | Facility: CLINIC | Age: 59
DRG: 003 | End: 2019-09-27
Payer: COMMERCIAL

## 2019-09-27 ENCOUNTER — APPOINTMENT (OUTPATIENT)
Dept: GENERAL RADIOLOGY | Facility: CLINIC | Age: 59
DRG: 003 | End: 2019-09-27
Attending: PHYSICIAN ASSISTANT
Payer: COMMERCIAL

## 2019-09-27 LAB
ALBUMIN SERPL-MCNC: 2.6 G/DL (ref 3.4–5)
ALP SERPL-CCNC: 64 U/L (ref 40–150)
ALT SERPL W P-5'-P-CCNC: 25 U/L (ref 0–50)
ANION GAP SERPL CALCULATED.3IONS-SCNC: 8 MMOL/L (ref 3–14)
AST SERPL W P-5'-P-CCNC: 19 U/L (ref 0–45)
BILIRUB SERPL-MCNC: 0.3 MG/DL (ref 0.2–1.3)
BUN SERPL-MCNC: 16 MG/DL (ref 7–30)
CALCIUM SERPL-MCNC: 8.7 MG/DL (ref 8.5–10.1)
CHLORIDE SERPL-SCNC: 108 MMOL/L (ref 94–109)
CO2 SERPL-SCNC: 26 MMOL/L (ref 20–32)
CREAT SERPL-MCNC: 0.69 MG/DL (ref 0.52–1.04)
ERYTHROCYTE [DISTWIDTH] IN BLOOD BY AUTOMATED COUNT: 16.5 % (ref 10–15)
GFR SERPL CREATININE-BSD FRML MDRD: >90 ML/MIN/{1.73_M2}
GLUCOSE SERPL-MCNC: 94 MG/DL (ref 70–99)
HCT VFR BLD AUTO: 33.3 % (ref 35–47)
HGB BLD-MCNC: 10.1 G/DL (ref 11.7–15.7)
INR PPP: 1.57 (ref 0.86–1.14)
INTERPRETATION ECG - MUSE: NORMAL
LMWH PPP CHRO-ACNC: 0.13 IU/ML
LMWH PPP CHRO-ACNC: 0.15 IU/ML
MAGNESIUM SERPL-MCNC: 2.3 MG/DL (ref 1.6–2.3)
MCH RBC QN AUTO: 29.4 PG (ref 26.5–33)
MCHC RBC AUTO-ENTMCNC: 30.3 G/DL (ref 31.5–36.5)
MCV RBC AUTO: 97 FL (ref 78–100)
PHOSPHATE SERPL-MCNC: 3.5 MG/DL (ref 2.5–4.5)
PLATELET # BLD AUTO: 81 10E9/L (ref 150–450)
PLATELET # BLD AUTO: ABNORMAL 10E9/L (ref 150–450)
PLATELET # BLD AUTO: NORMAL 10E9/L (ref 150–450)
POTASSIUM SERPL-SCNC: 3.3 MMOL/L (ref 3.4–5.3)
POTASSIUM SERPL-SCNC: 3.4 MMOL/L (ref 3.4–5.3)
PROT SERPL-MCNC: 7 G/DL (ref 6.8–8.8)
RBC # BLD AUTO: 3.44 10E12/L (ref 3.8–5.2)
SODIUM SERPL-SCNC: 142 MMOL/L (ref 133–144)
WBC # BLD AUTO: 9.2 10E9/L (ref 4–11)

## 2019-09-27 PROCEDURE — 85520 HEPARIN ASSAY: CPT | Performed by: INTERNAL MEDICINE

## 2019-09-27 PROCEDURE — 25000128 H RX IP 250 OP 636: Performed by: PHYSICIAN ASSISTANT

## 2019-09-27 PROCEDURE — 80053 COMPREHEN METABOLIC PANEL: CPT | Performed by: INTERNAL MEDICINE

## 2019-09-27 PROCEDURE — 40000556 ZZH STATISTIC PERIPHERAL IV START W US GUIDANCE

## 2019-09-27 PROCEDURE — 25000132 ZZH RX MED GY IP 250 OP 250 PS 637: Performed by: SURGERY

## 2019-09-27 PROCEDURE — 97530 THERAPEUTIC ACTIVITIES: CPT | Mod: GP

## 2019-09-27 PROCEDURE — 85610 PROTHROMBIN TIME: CPT | Performed by: INTERNAL MEDICINE

## 2019-09-27 PROCEDURE — 97535 SELF CARE MNGMENT TRAINING: CPT | Mod: GO

## 2019-09-27 PROCEDURE — 21400000 ZZH R&B CCU UMMC

## 2019-09-27 PROCEDURE — 97530 THERAPEUTIC ACTIVITIES: CPT | Mod: GO

## 2019-09-27 PROCEDURE — 71045 X-RAY EXAM CHEST 1 VIEW: CPT

## 2019-09-27 PROCEDURE — 25000132 ZZH RX MED GY IP 250 OP 250 PS 637: Performed by: PHYSICIAN ASSISTANT

## 2019-09-27 PROCEDURE — 84100 ASSAY OF PHOSPHORUS: CPT | Performed by: INTERNAL MEDICINE

## 2019-09-27 PROCEDURE — 36415 COLL VENOUS BLD VENIPUNCTURE: CPT | Performed by: PHYSICIAN ASSISTANT

## 2019-09-27 PROCEDURE — 25000132 ZZH RX MED GY IP 250 OP 250 PS 637: Performed by: INTERNAL MEDICINE

## 2019-09-27 PROCEDURE — 83735 ASSAY OF MAGNESIUM: CPT | Performed by: INTERNAL MEDICINE

## 2019-09-27 PROCEDURE — 85049 AUTOMATED PLATELET COUNT: CPT | Performed by: PHYSICIAN ASSISTANT

## 2019-09-27 PROCEDURE — 99233 SBSQ HOSP IP/OBS HIGH 50: CPT | Performed by: PHYSICIAN ASSISTANT

## 2019-09-27 PROCEDURE — 25000132 ZZH RX MED GY IP 250 OP 250 PS 637: Performed by: STUDENT IN AN ORGANIZED HEALTH CARE EDUCATION/TRAINING PROGRAM

## 2019-09-27 PROCEDURE — 36415 COLL VENOUS BLD VENIPUNCTURE: CPT | Performed by: INTERNAL MEDICINE

## 2019-09-27 PROCEDURE — 85027 COMPLETE CBC AUTOMATED: CPT | Performed by: INTERNAL MEDICINE

## 2019-09-27 PROCEDURE — 84132 ASSAY OF SERUM POTASSIUM: CPT | Performed by: PHYSICIAN ASSISTANT

## 2019-09-27 RX ORDER — WARFARIN SODIUM 7.5 MG/1
7.5 TABLET ORAL
Status: COMPLETED | OUTPATIENT
Start: 2019-09-27 | End: 2019-09-27

## 2019-09-27 RX ORDER — METOPROLOL TARTRATE 25 MG/1
25 TABLET, FILM COATED ORAL 2 TIMES DAILY
Status: DISCONTINUED | OUTPATIENT
Start: 2019-09-27 | End: 2019-09-30

## 2019-09-27 RX ORDER — DULOXETIN HYDROCHLORIDE 30 MG/1
30 CAPSULE, DELAYED RELEASE ORAL DAILY
Status: DISCONTINUED | OUTPATIENT
Start: 2019-09-27 | End: 2019-09-29

## 2019-09-27 RX ADMIN — MICONAZOLE NITRATE: 20 POWDER TOPICAL at 21:21

## 2019-09-27 RX ADMIN — OXYCODONE HYDROCHLORIDE 5 MG: 5 TABLET ORAL at 21:18

## 2019-09-27 RX ADMIN — POTASSIUM CHLORIDE 40 MEQ: 750 TABLET, EXTENDED RELEASE ORAL at 11:50

## 2019-09-27 RX ADMIN — POTASSIUM CHLORIDE 20 MEQ: 750 TABLET, EXTENDED RELEASE ORAL at 17:20

## 2019-09-27 RX ADMIN — METOPROLOL TARTRATE 25 MG: 25 TABLET ORAL at 07:57

## 2019-09-27 RX ADMIN — THIAMINE HCL TAB 100 MG 100 MG: 100 TAB at 07:57

## 2019-09-27 RX ADMIN — PANTOPRAZOLE SODIUM 40 MG: 40 TABLET, DELAYED RELEASE ORAL at 07:57

## 2019-09-27 RX ADMIN — POTASSIUM CHLORIDE 20 MEQ: 750 TABLET, EXTENDED RELEASE ORAL at 13:44

## 2019-09-27 RX ADMIN — HEPARIN SODIUM 1650 UNITS/HR: 10000 INJECTION, SOLUTION INTRAVENOUS at 13:45

## 2019-09-27 RX ADMIN — PANTOPRAZOLE SODIUM 40 MG: 40 TABLET, DELAYED RELEASE ORAL at 17:20

## 2019-09-27 RX ADMIN — CEFTRIAXONE SODIUM 2 G: 2 INJECTION, POWDER, FOR SOLUTION INTRAMUSCULAR; INTRAVENOUS at 15:06

## 2019-09-27 RX ADMIN — METOPROLOL TARTRATE 25 MG: 25 TABLET ORAL at 21:17

## 2019-09-27 RX ADMIN — DULOXETINE HYDROCHLORIDE 30 MG: 30 CAPSULE, DELAYED RELEASE ORAL at 11:49

## 2019-09-27 RX ADMIN — OXYCODONE HYDROCHLORIDE 5 MG: 5 TABLET ORAL at 01:23

## 2019-09-27 RX ADMIN — DICLOFENAC 2 G: 10 GEL TOPICAL at 15:15

## 2019-09-27 RX ADMIN — OXYCODONE HYDROCHLORIDE 5 MG: 5 TABLET ORAL at 08:02

## 2019-09-27 RX ADMIN — ATORVASTATIN CALCIUM 40 MG: 40 TABLET, FILM COATED ORAL at 21:17

## 2019-09-27 RX ADMIN — HEPARIN SODIUM 1500 UNITS/HR: 10000 INJECTION, SOLUTION INTRAVENOUS at 00:13

## 2019-09-27 RX ADMIN — MULTIPLE VITAMINS W/ MINERALS TAB 1 TABLET: TAB at 07:57

## 2019-09-27 RX ADMIN — MICONAZOLE NITRATE: 20 POWDER TOPICAL at 07:57

## 2019-09-27 RX ADMIN — WARFARIN SODIUM 7.5 MG: 7.5 TABLET ORAL at 21:17

## 2019-09-27 RX ADMIN — ACETAMINOPHEN 650 MG: 325 TABLET, FILM COATED ORAL at 08:02

## 2019-09-27 RX ADMIN — ACETAMINOPHEN 650 MG: 325 TABLET, FILM COATED ORAL at 21:18

## 2019-09-27 RX ADMIN — ACETAMINOPHEN 650 MG: 325 TABLET, FILM COATED ORAL at 01:23

## 2019-09-27 ASSESSMENT — ACTIVITIES OF DAILY LIVING (ADL)
ADLS_ACUITY_SCORE: 13
ADLS_ACUITY_SCORE: 13
ADLS_ACUITY_SCORE: 17
ADLS_ACUITY_SCORE: 17
ADLS_ACUITY_SCORE: 13
ADLS_ACUITY_SCORE: 13

## 2019-09-27 ASSESSMENT — PAIN DESCRIPTION - DESCRIPTORS
DESCRIPTORS: SORE;ACHING
DESCRIPTORS: SORE

## 2019-09-27 ASSESSMENT — MIFFLIN-ST. JEOR: SCORE: 2330.15

## 2019-09-27 NOTE — PROGRESS NOTES
"Social Work: Assessment with Discharge Plan    Patient Name:  Shira Rodriguez  :  1960  Age:  59 year old  MRN:  [id number]  Risk/Complexity Score:  Filed Complexity Screen Score: 7  Completed assessment with:  Pt    Presenting Information   Reason for Referral:  Discharge plan  Date of Intake:  2019  Referral Source:  Chart Review  Decision Maker:  Pt when able  Alternate Decision Maker:  Pt reports her mother and sister are NOK.  Health Care Directive:  Will bring in copy  Living Situation:  Apartment - pt reports that her sister is her caregiver and can support her post TCU placement.  Sister also cares for pt's mother who lives with her (sister).  Pt lives alone at this time.  Previous Functional Status:  Independent  Patient and family understanding of hospitalization:  Pt states that she was surprised by her condition as it was \"not expected\".  Cultural/Language/Spiritual Considerations:  No needs reported.  Adjustment to Illness:  Pt adjusting appropriately and able to answer questions.    Physical Health  Reason for Admission:    1. Pulmonary embolism with acute cor pulmonale, unspecified chronicity, unspecified pulmonary embolism type (H)      Services Needed/Recommended:  TCU    Mental Health/Chemical Dependency  Diagnosis:  Depression noted  Support/Services in Place:  Pt sees a therapist through [facility]  Services Needed/Recommended:  Continue with previous outpatient services.  Will sign an MILAN with pt for [facility] to continue sharing of information.    Support System  Significant relationship at present time:  Sister per pt  Family of origin is available for support:  Yes - sister is pt's caregiver and advocate  Other support available:  No  Gaps in support system:  Pt lives alone at baseline.  Patient is caregiver to:  None     Provider Information   Primary Care Physician:  Anjel Busby   [phone]   Clinic:  [address] [place] [address] / [facility] [address]    "   :  None    Financial   Income Source:  Retired  Financial Concerns:  None reported, pt states friends have started a go fund me page for additional resources.  Insurance:    Payor/Plan Subscriber Name Rel Member # Group #   [employer] - [employer] * XAVIER ROWELL [id number]      [id number]       Discharge Plan   Patient and family discharge goal:  TCU  Provided education on discharge plan:  YES  Patient agreeable to discharge plan:  YES  A list of Medicare Certified Facilities was provided to the patient and/or family to encourage patient choice. Patient's choices for facility are:    [facility] - referred to see if pt can have a heparin drip and TCU stay at the same time.  If declined, pt would like to look closer to home in [place]  [phone]    Will NH provide Skilled rehabilitation or complex medical:  YES  General information regarding anticipated insurance coverage and possible out of pocket cost was discussed. Patient and patient's family are aware patient may incur the cost of transportation to the facility, pending insurance payment: YES  Barriers to discharge:  Medical stability, TCU placement pending    Discharge Recommendations   Anticipated Disposition:  Facility:  TCU  Transportation Needs:  Medical:  Stretcher  Name of Transportation Company and Phone:  [employer] if going to [place].  If going outside [facility] SW will need to discuss transport options with pt.    Additional comments   SW met with pt to introduce self and role in consult.  Referral started with [facility] on placement for rehab with heparin drip.  If denied, pt will need to be therapeutic with her INR and pursue a facility in network with [employer] that also services persons greater than 350# (Pt today is at 382#).    SW to follow for discharge planning support.    ABHISHEK Christianson, LCSW  6C Unit   Phone: [phone]  Pager: [phone]  Unit: [phone]

## 2019-09-27 NOTE — PLAN OF CARE
6C Discharge Planner PT   Patient plan for discharge: rehab  Current status: Pt in/out of A-fib on 2-3 LPMs of O2 via NC ( bpm) with transfer from EOB > commode mod A x 2 + FWW, and standing tolerance for dependent wiping + FWW min-mod A x 1-2. OH ceiling lift with A x 2 for safety from commode > supine in bed 2/2 A-fib. Recommend nursing continue to use OH ceiling lift and bed pan until HR with activity is stable WNL parameters.     Barriers to return to prior living situation: medical status, current mobility status, level of A, balance, weakness  Recommendations for discharge: TCU  Rationale for recommendations: Though pt would benefit from ARU, PT recommend discharge to TCU at this time pt would not be able to tolerate 3 hours of therapy/day 2/2 poor activity tolerance, chest pain, and HR.          Entered by: Radha Gates 09/27/2019 2:30 PM

## 2019-09-27 NOTE — PROGRESS NOTES
Rehab Admissions:  Therapy has been recommending TCU vs. ARC. I met with Shira in her room today to introduce [facility] program and to provide her with a brochure. Shira further discussed her baseline function and family support/plans for discharge. Shira is IND w/o device in her apartment. She has recently been using a walker for the long hallway in her apartment d/t back pain. She often will not take this walker with her into the community. She attended 12 PT pool sessions this summer to help with her back pain and had continued to do pool exercises for up to an hour at a time on her own in her apartment's pool. Shira can have support from her mom and sister as needed and could also stay with her mom and sister if needed.      Thank you for the referral, we will continue to follow this patient for post acute placement.     Determination of admission is based upon the patient's need for an intensive, interdisciplinary approach to rehabilitation, their ability to progress, their ability to tolerate intensive therapies, their need for daily physician supervision, their need for twenty four hour nursing assistance, and their ability and willingness to participate in such a program.    Meg Howard CM  Rehab Liaison/  [facility] [place] and Transitional Care Unit  9/27/2019    3:21 PM

## 2019-09-27 NOTE — PROGRESS NOTES
Calorie Count  Intake recorded for: 9/26  Total Kcals: 405 Total Protein: 8g  Kcals from Hospital Food: 405  Protein: 8g  Kcals from Outside Food (average):0 Protein: 0g  # Meals Recorded: 100% pancake w/ syrup, 2 strips of castillo   # Supplements Recorded: 0

## 2019-09-27 NOTE — PLAN OF CARE
OT/6C:   Discharge Planner OT   Patient plan for discharge: Rehab.   Current status: Patient in a-fib earlier this AM. Consulting with tele-monitoring who reports patient converting to sinus. Max A supine <> EOB with second assist available. Mod Ax2 sit <> stand to obtain standing weight on scale. Noted to convert back to A-fib with HR up to 160's. Returned supine with Max Ax2. Dependently boosted in bed with overhead sling. Further activity discontinued. Patient endorsing mild cognitive changes following PEA arrest. MoCA administered with patient scoring 24/30, indicating mild cognitive deficits. Score >26/30 considered WNL. Greatest areas of deficit include visuospatial/executive (3/5), delayed recall (3/5). Extra time required for serial 7 subtraction - reports did not have difficult with math calculations previously.   Barriers to return to prior living situation: Medical Status, Activity Tolerance, Level of A for Mobility, A-fib with HR increases during activity  Recommendations for discharge: TCU vs ARU currently.   Rationale for recommendations: Patient would benefit from intensive therapies, however is limited by onset of A-fib/HR elevations during sessions. Anticipate with medical improvement and increased tolerance, may be able to progress to ARU. Is motivated to participate and previously very independent. Unsure if would be able to tolerate 3 hours of therapy currently.        Entered by: Mari Howard 09/27/2019 4:07 PM

## 2019-09-27 NOTE — PLAN OF CARE
D/I/A: Pt up with assistance of 2 to BSC.  SANTOS noted.  A+O x4 and able to make needs known.  VSSA.  SR on monitor with noted episodes of SB down to 48 momentarily.  Pt resting.  Medicated for pain with current med orders and obtained medicated cream order for sternal pain; effective.  Pleasant and talkative with this nurse.  Family and friends visited at various times t/o evening and supportive.  Pt ate 100% of meal this evening; pot roast with mashed potatoes and gravy.  P: Continue to monitor status.  Encourage activity as able.

## 2019-09-27 NOTE — PROGRESS NOTES
"CLINICAL NUTRITION SERVICES     Nutrition Prescription    Recommendations already ordered by Registered Dietitian (RD):  Modified oral supplements    Future/Additional Recommendations:  For all recommendations, see prior nutrition notes.      Diet: On a 2 g sodium diet since 9/26. Ordered to receive Boost Plus between meals.   Intake: Pt has been ordering about two meals daily. Per discussion with pt, she does not care for the Boost Plus as they are \"thick.\" Instead, she has been trying to order more from the menu.   Kcal counts:   9/25   487 kcals and 24 g protein (100% scrambled eggs, sausage lauren, orange juice and no supplement/s recorded) - Breakfast was recorded. Supper was not recorded. If pt consumed 100% of supper this would have provided 580 kcals and 22 g protein.    9/26   405 kcals and 8 g protein (100% pancake w/ syrup, 2 strips of castillo  and no supplement/s recorded) - Breakfast was recorded. Supper was not recorded. If pt consumed 100% of supper this would have provided 365 kcals and 25 g protein.    9/27   Pending   * Not meeting estimated needs of 1798-3015 kcals/day (20-25 kcals/kg) and 132-176 g/day (1.5-2 g/kg IBW).    INTERVENTIONS:  Implementation:  Medical food supplement therapy: Changed Boost Plus supplements to berry Boost Breeze at 14:00. Left an oral supplement menu with her.   Nutrition education for nutrition relationship to health/disease: Encouraged oral intake.    Follow up/Monitoring:  Will continue to follow pt.    Sandra Cortés, MS, RD, LD, [facility]   6C Pgr: [phone]           "

## 2019-09-27 NOTE — PLAN OF CARE
Temp: 97.8  F (36.6  C) Temp src: Oral BP: 130/61 Pulse: 85 Heart Rate: 76 Resp: 22 SpO2: 97 % O2 Device: Nasal cannula Oxygen Delivery: 2 LPM     D: PE/PEA arrest 9/9/19 s/p ECMO and thrombectomy. Hx HTN, HLD, CYRUS, obesity.    I/A: Monitored vitals and assessed pt status. A&O x4. VSS see flowsheet. SR/ST. Converted to afib 0537, HR 90-120s, crosscover notified. 2-3L O2. SANTOS. Reports midsternal pain, prn tylenol and oxycodone x1. Heparin gtt increased to 1,650 units/hr and bolus. 10A recheck pending. Voiding adequate amount in bedside commode. Up with assist x2, walker, gait belt, mechanical lift. Sleeping between cares.    P: Continue to monitor and follow POC. Notify CSI with changes.

## 2019-09-27 NOTE — PLAN OF CARE
VSS, except Afib/Aflutter 80s-140s.  Converted to SR 80s-100s at 1604.  HR increases to 140s-160s with activity.  SOB with activity, otherwise asymptomatic.  A&Ox4, on RA-2L NC.  Pt complains of chest pain (from chest compressions) intermittently made better w/ PRN oxycodone, tylenol, and voltaren gel.  Heparin gtt 1650 units/hr.  Xa recheck in AM.  Александр counts through 9/27.  Up w/ 2 and a mechanical lift.  Activity and oral intake encouraged.  Potassium (3.4) replaced.  Plan to continue to bridge to coumadin and discharge to TCU when bed becomes available.  Will continue to monitor and notify CSI with any concerns or questions.

## 2019-09-27 NOTE — PROGRESS NOTES
[facility]   Cardiology Consults  Progress Note     Interval History:  - Back in atrial fibrillation this AM. Rates are ~100-110 by manual check. Remains asymptomatic  - Remains mildly short of breath, continues with cough with chest pain  - denies chest pain at rest, fever, chills, lower extremity edema, numbness or weakness    Physical Exam:  Temp:  [97.8  F (36.6  C)-98.4  F (36.9  C)] 97.8  F (36.6  C)  Pulse:  [80-85] 85  Heart Rate:  [] 108  Resp:  [18-22] 18  BP: (127-133)/(58-63) 129/61  SpO2:  [87 %-97 %] 93 %      Intake/Output Summary (Last 24 hours) at 9/27/2019 0945  Last data filed at 9/27/2019 0800  Gross per 24 hour   Intake 1796.58 ml   Output 1275 ml   Net 521.58 ml        Wt:   Wt Readings from Last 5 Encounters:   09/24/19 (!) 172 kg (379 lb 3.1 oz)   09/09/19 (!) 178.6 kg (393 lb 11.9 oz)   08/12/19 (!) 181.4 kg (400 lb)   06/26/19 (!) 191 kg (421 lb)   05/23/19 (!) 195.9 kg (431 lb 14.4 oz)     GEN: NAD, awake, alert, pleasant  Pulm: Seemingly CTAB, no cough, no wheeze  Cardiac: JVP difficult to assess secondary to body habitus, no murmurs, appears RRR  Vascular: Trace lower extremity edema and palpable pulses  GI: soft, non distended, protuberant abdomen   Ext: ECMO site c/d/i, no hematoma. Bandages over panus wounds c/d/i   Neuro: CN II-XII grossly intact    Medications:    amiodarone  75 mg Intravenous Once     atorvastatin  40 mg Oral QPM     cefTRIAXone  2 g Intravenous Q24H     DULoxetine  30 mg Oral Daily     influenza vaccine adult (product based on age)  0.5 mL Intramuscular Prior to discharge     metoprolol tartrate  25 mg Oral BID     miconazole   Topical BID     multivitamin w/minerals  1 tablet Oral or Feeding Tube Daily     pantoprazole  40 mg Oral BID AC     sodium chloride (PF)  3 mL Intracatheter Q8H     vitamin B1  100 mg Oral or Feeding Tube Daily     warfarin ANTICOAGULANT  7.5 mg Oral ONCE at 18:00       IV fluid REPLACEMENT ONLY 65  mL/hr at 09/22/19 2155     IV fluid REPLACEMENT ONLY       HEParin 1,650 Units/hr (09/27/19 0744)     - MEDICATION INSTRUCTIONS -       - MEDICATION INSTRUCTIONS -       - MEDICATION INSTRUCTIONS -       Warfarin Therapy Reminder         Labs:   CMP  Recent Labs   Lab 09/27/19 0628 09/26/19  0655 09/25/19  1606 09/25/19  0602 09/24/19  0347    143  --  144 144   POTASSIUM 3.3* 3.4 4.1 3.4 3.5   CHLORIDE 108 109  --  111* 111*   CO2 26 25  --  26 26   ANIONGAP 8 9  --  8 7   GLC 94 90  --  76 84   BUN 16 20  --  25 29   CR 0.69 0.78  --  0.80 0.77   GFRESTIMATED >90 84  --  82 85   GFRESTBLACK >90 >90  --  >90 >90   MARIA INES 8.7 8.7  --  9.0 9.3   MAG 2.3 2.4* 2.2 2.5* 2.7*   PHOS 3.5 3.1  --  3.7 3.8   PROTTOTAL 7.0 6.7*  --  6.8 6.8   ALBUMIN 2.6* 2.5*  --  2.5* 2.5*   BILITOTAL 0.3 0.3  --  0.2 0.3   ALKPHOS 64 65  --  71 74   AST 19 14  --  14 11   ALT 25 23  --  26 25     CBC  Recent Labs   Lab 09/27/19 0628 09/26/19 0655 09/25/19  0602 09/24/19  0347   WBC 9.2 9.1 8.4 10.4   RBC 3.44* 3.24* 3.38* 3.29*   HGB 10.1* 9.4* 9.7* 9.5*   HCT 33.3* 31.3* 32.8* 32.3*   MCV 97 97 97 98   MCH 29.4 29.0 28.7 28.9   MCHC 30.3* 30.0* 29.6* 29.4*   RDW 16.5* 16.3* 16.0* 15.8*   PLT Platelets clumped, platelet count unavailable 116* Platelets clumped, platelet count unavailable 57*     INR  Recent Labs   Lab 09/27/19 0628 09/26/19  0725 09/26/19  0655 09/25/19  0602   INR 1.57* 1.02 1.37* 2.08*     Arterial Blood Gas  Recent Labs   Lab 09/23/19  1231 09/23/19  0300 09/22/19  2130 09/22/19  1549   PH 7.47* 7.44 7.44 7.46*   PCO2 44 48* 49* 47*   PO2 57* 75* 71* 73*   HCO3 32* 33* 33* 33*   O2PER 3L 3L 3.5L 30       Diagnostics:    Echo 9/22/2019:  Interpretation Summary  Technically difficult study.  Left ventricular function, chamber size, wall motion, and wall thickness are  normal.The EF is 55-60%.  Right ventricular function, chamber size, wall motion, and thickness are  normal.  No pericardial effusion is  present.  _____________________________________________________________________________    Left Ventricle  Left ventricular function, chamber size, wall motion, and wall thickness are  normal.The EF is 55-60%.     Right Ventricle  Right ventricular function, chamber size, wall motion, and thickness are  normal.     Mitral Valve  Mild to moderate mitral annular calcification is present. Trace mitral  insufficiency is present.     Tricuspid Valve  Trace tricuspid insufficiency is present. The peak velocity of the tricuspid  regurgitant jet is not obtainable.        Vessels  The aorta root is normal. Unable to assess mean RA pressure given the patient  is on a ventilator.     Pericardium  No pericardial effusion is present.    Stress test:    Coronary angiogram:    ASSESSMENT/PLAN:  Shira Rodriguez is a 59 F with a history of hypertension, hyperlipidemia, sleep apnea, morbid obesity, and chronic bronchitis who presented to [facility] with massive PE with following PEA arrest 9/9 and ROSC after intubation/resusitation.    #PEA arrest secondary to massive pulmonary embolism  ##Hypoxic respiratory failure   ###Volume overload  Transferred to [facility] for [facility] ECMO, and IR thrombectomy and catheter directed lytics on 9/9, went for VA ECMO decannulation on 9/17 complicated by hypercarbic respiratory failure requiring placement of VV ECMO through RIJ-FV bypass. Now VV ECMO s/p decannulation on 9/19. Off sedation 9/22, extubated 9/22. Repeat CT with no residual clot in PA. RV function improved to normal. She is being bridged to a therapeutic INR and has minimal oxygen requirements at this stage s/p diuresis.   - Left groin incision with retention sutures. Should be removed 9/30/2019. Will need wound care order at discharge if she leaves before then  - Warfarin, goal INR 2-3. Pharmacy to dose  - Resume heparin gtt given subtherapeutic INR today  - Duonebs PRN  - Acapella qid   - Continue to wean oxygen as  able    #Afib/Aflutter  Noted to be in atrial fibrillation while in the ICU and on dobutamine, which was thought to be exacerbating factor. Dobutamine discontinued 9/23 and patient was given amiodarone bolus at that time. She converted to sinus. On 9/25 she went into atrial flutter with variable block. Again was given an amio bolus, fluid bolus, and IV metop x1. Continues to intermittently go into a-fib.   - Increase metoprolol to 25mg bid  - warfarin as above    #COPD  ##History of tobacco abuse  ###Aspiration pneumonia   Aspiration pneumonia:   - 9/11 sputum culture MSSA    - 9/20 Serratia (ceftriaxone susceptible)  - Completed vancomycin/zosyn x5 days for ECMO, vanc last dose 9/15, cefazolin on 9/17.  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 Had precipitous drop in platelets after admission - suspected due to consumption.   - CBC daily    #History of depression  Cymbalta held while in ICU. Will resume at lower dose over next few days, and increase to PTA dose. Additionally reports that she may have been on wellbutrin, however, notes regarding this state she had increased crying episodes while on this medication. Will attempt to find out if she has been filling this drug.  - start cymbalta 30mg daily for 5 days, then increase to 60mg daily    #HTN   - metoprolol as above  - Will resume PTA lisinopril pending BP    #HLD  #CYRUS   - Oxygen at night PRN to keep O2 sats >90%.   - Sleep study referral at discharge    Patient discussed with Dr. Jones, who agrees with above plan.      oTrsten Foster PA-C  [facility] Cardiology Team

## 2019-09-27 NOTE — TELEPHONE ENCOUNTER
Patient informed me that she has been in the hospital since 9/9/19 in the ICU and on ECMO secondary to cardiac arrest and pulmonary embolism. She states that she wasn't getting her depression medications but that she thinks that they figured out with ones she was on and started her on them again, currently at half dose. I told her to give us a call when she is discharged from the hospital and in therapy so that we can get a follow-up appointment scheduled with Dr. Busby. Patient was in agreement with this plan.     Madelin Garza on 9/27/2019 at 4:00 PM

## 2019-09-28 ENCOUNTER — APPOINTMENT (OUTPATIENT)
Dept: OCCUPATIONAL THERAPY | Facility: CLINIC | Age: 59
DRG: 003 | End: 2019-09-28
Payer: COMMERCIAL

## 2019-09-28 LAB
ALBUMIN SERPL-MCNC: 2.5 G/DL (ref 3.4–5)
ALP SERPL-CCNC: 66 U/L (ref 40–150)
ALT SERPL W P-5'-P-CCNC: 20 U/L (ref 0–50)
ANION GAP SERPL CALCULATED.3IONS-SCNC: 9 MMOL/L (ref 3–14)
AST SERPL W P-5'-P-CCNC: 9 U/L (ref 0–45)
BILIRUB SERPL-MCNC: 0.3 MG/DL (ref 0.2–1.3)
BUN SERPL-MCNC: 15 MG/DL (ref 7–30)
CALCIUM SERPL-MCNC: 8.9 MG/DL (ref 8.5–10.1)
CHLORIDE SERPL-SCNC: 110 MMOL/L (ref 94–109)
CO2 SERPL-SCNC: 23 MMOL/L (ref 20–32)
CREAT SERPL-MCNC: 0.77 MG/DL (ref 0.52–1.04)
ERYTHROCYTE [DISTWIDTH] IN BLOOD BY AUTOMATED COUNT: 17 % (ref 10–15)
GFR SERPL CREATININE-BSD FRML MDRD: 85 ML/MIN/{1.73_M2}
GLUCOSE SERPL-MCNC: 96 MG/DL (ref 70–99)
HCT VFR BLD AUTO: 30.6 % (ref 35–47)
HGB BLD-MCNC: 9.2 G/DL (ref 11.7–15.7)
INR PPP: 1.65 (ref 0.86–1.14)
LMWH PPP CHRO-ACNC: 0.18 IU/ML
MAGNESIUM SERPL-MCNC: 2.2 MG/DL (ref 1.6–2.3)
MCH RBC QN AUTO: 29.3 PG (ref 26.5–33)
MCHC RBC AUTO-ENTMCNC: 30.1 G/DL (ref 31.5–36.5)
MCV RBC AUTO: 98 FL (ref 78–100)
PHOSPHATE SERPL-MCNC: 3.1 MG/DL (ref 2.5–4.5)
PLATELET # BLD AUTO: 75 10E9/L (ref 150–450)
POTASSIUM SERPL-SCNC: 3.9 MMOL/L (ref 3.4–5.3)
PROT SERPL-MCNC: 6.8 G/DL (ref 6.8–8.8)
RBC # BLD AUTO: 3.14 10E12/L (ref 3.8–5.2)
SODIUM SERPL-SCNC: 142 MMOL/L (ref 133–144)
WBC # BLD AUTO: 8.7 10E9/L (ref 4–11)

## 2019-09-28 PROCEDURE — 25000132 ZZH RX MED GY IP 250 OP 250 PS 637: Performed by: SURGERY

## 2019-09-28 PROCEDURE — 93010 ELECTROCARDIOGRAM REPORT: CPT | Performed by: INTERNAL MEDICINE

## 2019-09-28 PROCEDURE — 25000132 ZZH RX MED GY IP 250 OP 250 PS 637: Performed by: PHYSICIAN ASSISTANT

## 2019-09-28 PROCEDURE — 36415 COLL VENOUS BLD VENIPUNCTURE: CPT | Performed by: INTERNAL MEDICINE

## 2019-09-28 PROCEDURE — 97530 THERAPEUTIC ACTIVITIES: CPT | Mod: GO

## 2019-09-28 PROCEDURE — 97535 SELF CARE MNGMENT TRAINING: CPT | Mod: GO

## 2019-09-28 PROCEDURE — 85520 HEPARIN ASSAY: CPT | Performed by: PHYSICIAN ASSISTANT

## 2019-09-28 PROCEDURE — 83735 ASSAY OF MAGNESIUM: CPT | Performed by: INTERNAL MEDICINE

## 2019-09-28 PROCEDURE — 85610 PROTHROMBIN TIME: CPT | Performed by: INTERNAL MEDICINE

## 2019-09-28 PROCEDURE — 25000132 ZZH RX MED GY IP 250 OP 250 PS 637: Performed by: INTERNAL MEDICINE

## 2019-09-28 PROCEDURE — 80053 COMPREHEN METABOLIC PANEL: CPT | Performed by: INTERNAL MEDICINE

## 2019-09-28 PROCEDURE — 99233 SBSQ HOSP IP/OBS HIGH 50: CPT | Performed by: PHYSICIAN ASSISTANT

## 2019-09-28 PROCEDURE — 85027 COMPLETE CBC AUTOMATED: CPT | Performed by: INTERNAL MEDICINE

## 2019-09-28 PROCEDURE — 84100 ASSAY OF PHOSPHORUS: CPT | Performed by: INTERNAL MEDICINE

## 2019-09-28 PROCEDURE — 21400000 ZZH R&B CCU UMMC

## 2019-09-28 PROCEDURE — 25000132 ZZH RX MED GY IP 250 OP 250 PS 637: Performed by: STUDENT IN AN ORGANIZED HEALTH CARE EDUCATION/TRAINING PROGRAM

## 2019-09-28 PROCEDURE — 25000128 H RX IP 250 OP 636: Performed by: PHYSICIAN ASSISTANT

## 2019-09-28 PROCEDURE — 85610 PROTHROMBIN TIME: CPT | Performed by: PHYSICIAN ASSISTANT

## 2019-09-28 RX ORDER — FUROSEMIDE 10 MG/ML
40 INJECTION INTRAMUSCULAR; INTRAVENOUS ONCE
Status: DISCONTINUED | OUTPATIENT
Start: 2019-09-28 | End: 2019-09-28

## 2019-09-28 RX ORDER — WARFARIN SODIUM 10 MG/1
10 TABLET ORAL
Status: COMPLETED | OUTPATIENT
Start: 2019-09-28 | End: 2019-09-28

## 2019-09-28 RX ORDER — FUROSEMIDE 10 MG/ML
20 INJECTION INTRAMUSCULAR; INTRAVENOUS ONCE
Status: COMPLETED | OUTPATIENT
Start: 2019-09-28 | End: 2019-09-28

## 2019-09-28 RX ORDER — FUROSEMIDE 10 MG/ML
40 INJECTION INTRAMUSCULAR; INTRAVENOUS ONCE
Status: COMPLETED | OUTPATIENT
Start: 2019-09-29 | End: 2019-09-29

## 2019-09-28 RX ADMIN — MICONAZOLE NITRATE: 20 POWDER TOPICAL at 19:49

## 2019-09-28 RX ADMIN — MICONAZOLE NITRATE: 20 POWDER TOPICAL at 08:19

## 2019-09-28 RX ADMIN — MULTIPLE VITAMINS W/ MINERALS TAB 1 TABLET: TAB at 08:18

## 2019-09-28 RX ADMIN — THIAMINE HCL TAB 100 MG 100 MG: 100 TAB at 08:18

## 2019-09-28 RX ADMIN — HEPARIN SODIUM 1650 UNITS/HR: 10000 INJECTION, SOLUTION INTRAVENOUS at 11:30

## 2019-09-28 RX ADMIN — PANTOPRAZOLE SODIUM 40 MG: 40 TABLET, DELAYED RELEASE ORAL at 17:53

## 2019-09-28 RX ADMIN — FUROSEMIDE 20 MG: 10 INJECTION, SOLUTION INTRAVENOUS at 08:18

## 2019-09-28 RX ADMIN — PANTOPRAZOLE SODIUM 40 MG: 40 TABLET, DELAYED RELEASE ORAL at 08:18

## 2019-09-28 RX ADMIN — METOPROLOL TARTRATE 25 MG: 25 TABLET ORAL at 08:18

## 2019-09-28 RX ADMIN — METOPROLOL TARTRATE 25 MG: 25 TABLET ORAL at 19:39

## 2019-09-28 RX ADMIN — POTASSIUM CHLORIDE 20 MEQ: 750 TABLET, EXTENDED RELEASE ORAL at 19:39

## 2019-09-28 RX ADMIN — ACETAMINOPHEN 650 MG: 325 TABLET, FILM COATED ORAL at 19:40

## 2019-09-28 RX ADMIN — OXYCODONE HYDROCHLORIDE 5 MG: 5 TABLET ORAL at 08:22

## 2019-09-28 RX ADMIN — OXYCODONE HYDROCHLORIDE 5 MG: 5 TABLET ORAL at 19:40

## 2019-09-28 RX ADMIN — WARFARIN SODIUM 10 MG: 10 TABLET ORAL at 17:53

## 2019-09-28 RX ADMIN — ATORVASTATIN CALCIUM 40 MG: 40 TABLET, FILM COATED ORAL at 19:39

## 2019-09-28 RX ADMIN — DULOXETINE HYDROCHLORIDE 30 MG: 30 CAPSULE, DELAYED RELEASE ORAL at 08:18

## 2019-09-28 ASSESSMENT — ACTIVITIES OF DAILY LIVING (ADL)
ADLS_ACUITY_SCORE: 15
ADLS_ACUITY_SCORE: 15
ADLS_ACUITY_SCORE: 13
ADLS_ACUITY_SCORE: 15

## 2019-09-28 ASSESSMENT — PAIN DESCRIPTION - DESCRIPTORS
DESCRIPTORS: ACHING;SORE
DESCRIPTORS: ACHING;SORE

## 2019-09-28 NOTE — PROGRESS NOTES
Calorie Count  Intake recorded for: 9/27  Total Kcals: 1054 Total Protein: 68g  Kcals from Hospital Food: 1054  Protein: 68g  Kcals from Outside Food (average):0 Protein: 0g  # Meals Recorded: 2 meals (First - 100% scrambled eggs, english muffin, orange juice, 1 strip castillo)      (Second - 100% chicken salad sandwich on white bread, low sodium tomato soup)  # Supplements Recorded: 0

## 2019-09-28 NOTE — PROGRESS NOTES
[facility]   Cardiology Consults  Progress Note     Interval History:  - Chest pain with cough. Some dyspnea with exertion. Denies chest pain at rest, leg edema, numbness, weakness, fever, or chills  - no acute events overnight. On minimal oxygen.     Physical Exam:  Temp:  [97.9  F (36.6  C)-98.6  F (37  C)] 98.2  F (36.8  C)  Pulse:  [74] 74  Heart Rate:  [77-93] 81  Resp:  [16-18] 18  BP: (102-145)/(49-71) 133/58  SpO2:  [90 %-96 %] 96 %        Intake/Output Summary (Last 24 hours) at 9/28/2019 1218  Last data filed at 9/28/2019 1100  Gross per 24 hour   Intake 1618 ml   Output 1300 ml   Net 318 ml            Wt:   Wt Readings from Last 5 Encounters:   09/27/19 (!) 173.4 kg (382 lb 3.2 oz)   09/09/19 (!) 178.6 kg (393 lb 11.9 oz)   08/12/19 (!) 181.4 kg (400 lb)   06/26/19 (!) 191 kg (421 lb)   05/23/19 (!) 195.9 kg (431 lb 14.4 oz)     GEN: NAD, awake, alert, pleasant  Pulm: Bibasilar crackles, no cough, no wheeze  Cardiac: JVP difficult to assess secondary to body habitus, no murmurs, appears RRR  Vascular: Trace lower extremity edema and palpable pulses  GI: soft, non distended, protuberant abdomen   Ext: ECMO site c/d/i, no hematoma. Bandages over panus wounds c/d/i   Neuro: CN II-XII grossly intact    Medications:    atorvastatin  40 mg Oral QPM     DULoxetine  30 mg Oral Daily     influenza vaccine adult (product based on age)  0.5 mL Intramuscular Prior to discharge     metoprolol tartrate  25 mg Oral BID     miconazole   Topical BID     multivitamin w/minerals  1 tablet Oral or Feeding Tube Daily     pantoprazole  40 mg Oral BID AC     sodium chloride (PF)  3 mL Intracatheter Q8H     vitamin B1  100 mg Oral or Feeding Tube Daily     warfarin ANTICOAGULANT  10 mg Oral ONCE at 18:00       IV fluid REPLACEMENT ONLY 65 mL/hr at 09/22/19 2155     IV fluid REPLACEMENT ONLY       HEParin 1,650 Units/hr (09/28/19 6055)     - MEDICATION INSTRUCTIONS -       - MEDICATION INSTRUCTIONS  -       - MEDICATION INSTRUCTIONS -       Warfarin Therapy Reminder         Labs:   CMP  Recent Labs   Lab 09/28/19  0705 09/27/19  1458 09/27/19  0628 09/26/19  0655 09/25/19  1606 09/25/19  0602     --  142 143  --  144   POTASSIUM 3.9 3.4 3.3* 3.4 4.1 3.4   CHLORIDE 110*  --  108 109  --  111*   CO2 23  --  26 25  --  26   ANIONGAP 9  --  8 9  --  8   GLC 96  --  94 90  --  76   BUN 15  --  16 20  --  25   CR 0.77  --  0.69 0.78  --  0.80   GFRESTIMATED 85  --  >90 84  --  82   GFRESTBLACK >90  --  >90 >90  --  >90   MARIA INES 8.9  --  8.7 8.7  --  9.0   MAG 2.2  --  2.3 2.4* 2.2 2.5*   PHOS 3.1  --  3.5 3.1  --  3.7   PROTTOTAL 6.8  --  7.0 6.7*  --  6.8   ALBUMIN 2.5*  --  2.6* 2.5*  --  2.5*   BILITOTAL 0.3  --  0.3 0.3  --  0.2   ALKPHOS 66  --  64 65  --  71   AST 9  --  19 14  --  14   ALT 20  --  25 23  --  26     CBC  Recent Labs   Lab 09/28/19  0705 09/27/19  1458 09/27/19  1119 09/27/19  0628 09/26/19  0655 09/25/19  0602   WBC 8.7  --   --  9.2 9.1 8.4   RBC 3.14*  --   --  3.44* 3.24* 3.38*   HGB 9.2*  --   --  10.1* 9.4* 9.7*   HCT 30.6*  --   --  33.3* 31.3* 32.8*   MCV 98  --   --  97 97 97   MCH 29.3  --   --  29.4 29.0 28.7   MCHC 30.1*  --   --  30.3* 30.0* 29.6*   RDW 17.0*  --   --  16.5* 16.3* 16.0*   PLT 75* 81* Platelets clumped, platelet count unavailable Platelets clumped, platelet count unavailable 116* Platelets clumped, platelet count unavailable     INR  Recent Labs   Lab 09/28/19  0705 09/27/19  0628 09/26/19  0725 09/26/19  0655   INR 1.65* 1.57* 1.02 1.37*     Arterial Blood Gas  Recent Labs   Lab 09/23/19  1231 09/23/19  0300 09/22/19  2130 09/22/19  1549   PH 7.47* 7.44 7.44 7.46*   PCO2 44 48* 49* 47*   PO2 57* 75* 71* 73*   HCO3 32* 33* 33* 33*   O2PER 3L 3L 3.5L 30       Diagnostics:    Echo 9/22/2019:  Interpretation Summary  Technically difficult study.  Left ventricular function, chamber size, wall motion, and wall thickness are  normal.The EF is 55-60%.  Right ventricular  function, chamber size, wall motion, and thickness are  normal.  No pericardial effusion is present.  _____________________________________________________________________________    Left Ventricle  Left ventricular function, chamber size, wall motion, and wall thickness are  normal.The EF is 55-60%.     Right Ventricle  Right ventricular function, chamber size, wall motion, and thickness are  normal.     Mitral Valve  Mild to moderate mitral annular calcification is present. Trace mitral  insufficiency is present.     Tricuspid Valve  Trace tricuspid insufficiency is present. The peak velocity of the tricuspid  regurgitant jet is not obtainable.        Vessels  The aorta root is normal. Unable to assess mean RA pressure given the patient  is on a ventilator.     Pericardium  No pericardial effusion is present.    Stress test:    Coronary angiogram:    ASSESSMENT/PLAN:  Shira Rodriguez is a 59 F with a history of hypertension, hyperlipidemia, sleep apnea, morbid obesity, and chronic bronchitis who presented to [facility] with massive PE with following PEA arrest 9/9 and ROSC after intubation/resusitation.    #PEA arrest secondary to massive pulmonary embolism  ##Hypoxic respiratory failure   ###Volume overload  Transferred to [facility] for VA ECMO, and IR thrombectomy and catheter directed lytics on 9/9, went for VA ECMO decannulation on 9/17 complicated by hypercarbic respiratory failure requiring placement of VV ECMO through RIJ-FV bypass. Now VV ECMO s/p decannulation on 9/19. Off sedation 9/22, extubated 9/22. Repeat CT with no residual clot in PA. RV function improved to normal. She is being bridged to a therapeutic INR and has minimal oxygen requirements at this stage s/p diuresis. CXR 9/27 with continued pulmonary edema. Will continue with intermittent lasix  - Left groin incision with retention sutures. Should be removed 9/30/2019. Will need wound care order at discharge if she leaves before then  - Warfarin, goal  INR 2-3. Pharmacy to dose  - Continue heparin as bridge  - Duonebs PRN  - Acapella qid   - Continue to wean oxygen as able  - repeat IV lasix 20mg this AM    #Afib/Aflutter  Noted to be in atrial fibrillation while in the ICU and on dobutamine, which was thought to be exacerbating factor. Dobutamine discontinued 9/23 and patient was given amiodarone bolus at that time. She converted to sinus. On 9/25 she went into atrial flutter with variable block. Again was given an amio bolus, fluid bolus, and IV metop x1. Continues to intermittently go into a-fib.   - metoprolol to 25mg bid  - warfarin as above    #COPD  ##History of tobacco abuse  ###Aspiration pneumonia   Aspiration pneumonia:   - 9/11 sputum culture MSSA    - 9/20 Serratia (ceftriaxone susceptible)  - Completed vancomycin/zosyn x5 days for ECMO, vanc last dose 9/15, cefazolin on 9/17.  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 Had precipitous drop in platelets after admission - suspected due to consumption.   - CBC daily    #History of depression  Cymbalta held while in ICU. Will resume at lower dose over next few days, and increase to PTA dose. Additionally reports that she may have been on wellbutrin, however, notes regarding this state she had increased crying episodes while on this medication. Will attempt to find out if she has been filling this drug.  - cymbalta 30mg daily for 5 days, then increase to 60mg daily    #HTN   - metoprolol as above  - Will resume PTA lisinopril pending BP    #HLD  #CYRUS   - Oxygen at night PRN to keep O2 sats >90%.   - has CPAP at home. Not wanting to use CPAP while inpatient.     Patient discussed with Dr. Stuart, who agrees with above plan.      Torsten Foster PA-C  [facility] Cardiology Team

## 2019-09-28 NOTE — PROVIDER NOTIFICATION
MD CORRALES paged regarding patients HR down into the 30-40s more frequently now every 1-5 min. Lowest rate was 20. Does not sustain susan rate for more than 5 seconds. HR back up to 60-70s.  Will continue to monitor and follow POC.

## 2019-09-28 NOTE — PLAN OF CARE
Shift:   VS: Temp: 97.6  F (36.4  C) Temp src: Oral BP: 126/58 Pulse: 78 Heart Rate: 74 Resp: 16 SpO2: 94 % O2 Device: Nasal cannula Oxygen Delivery: 1 LPM  Pain: Given tylenol for low back pain  Neuro: A&Ox4, calls appropriately  Cardiac:  NSR with HR in 70s, stated chest pain is getting better  Respiratory: Sating >92% at 1L NC, dyspneic with activities.  GI/Diet/Appetite: Good oral intake, no nausea reported  :  Adequate UO post lasix tx, small BM x1  LDA's: PIV with heparin gtt infusing at 16.5cc.hr, Hep10A therapeutic, no change in rate. Bridging heparin to coumadin  Skin: Wound care to bilateral groin redness/open areas.   Activity: Up to commode with Ax1-2 with walker.   Tests/Procedures:   Pertinent Labs/Lab Collection:      Plan: Continue to monitor closely and update MD with any changes.

## 2019-09-28 NOTE — PLAN OF CARE
OT 6C  Discharge Planner OT   Patient plan for discharge: rehab  Current status: SBA for bilateral rolling in bed. Max A for ally cares and donning socks. Min A supine to EOB. SBA sitting EOB. CGA x2 sit<>stand x3 reps throughout session with FWW. Pt stood for ~30-60 seconds while completing marching in place x2 bout with FWW, CGA x2. Mod A EOB to supine  Barriers to return to prior living situation: fatigue, deconditioning, limited activity tolerance, acute medical needs  Recommendations for discharge: TCU  Rationale for recommendations: Pt is below baseline and would benefit from continued skilled therapy to increase activity tolerance and independence with ADLs. At this time, anticipate pt would not tolerate 3 hours of therapy required for ARU.          Entered by: Char Witt 09/28/2019 10:00 AM

## 2019-09-28 NOTE — PLAN OF CARE
1900-0730: Admitted from [facility] after PEA arrest, ECMO initiated. Decannulated 9/19, extubated 9/22. PMH: HTN, CYRUS, morbid obesity, HLD, and chronic bronchitis.   Neuro: A&Ox4, slightly forgetful and impulsive at times.  Cardiac: Metoprolol increased yesterday to 25mg BID. Patient has several instances of converting to A.fib/flutter requiring amio boluses and then converting back to NSR, rates in 70s. This shift, remained in NSR and had episodes of bradycardia throughout the night slowly increasing with heart rates in the 30-40s, lowest instance 20bpm. Patient asymptomatic, sleeping during most of the bradycardic episodes. Only lasting 1-5 seconds each. EKG ordered, NSR. MD updated, no change of orders at this time.  Resp: 1L  GI/: Voiding adequate amounts, no BM.   Diet/Appetite: Tolerating regular diet, good appetite.  Skin: Wound care to bilateral groin-redness/open areas. See WOC note. On pulsate mattress.  Access: L) PIV infusing heparin gtt at 1,650 units/hr, recheck pending this AM. R) PIV SL.  Drains: none  Activity: Ax2, lift.  Pain: PRN Tylenol and Oxy x1 for chest soreness related to compressions.  Plan: working with PT/OT, with activity, patient's HR jumps to 140s. Bridging heparin to coumadin, subtherapeutic INR. Will need TCU at discharge.    Will continue with plan of care and notify MD of any changes.      electronic

## 2019-09-28 NOTE — PROVIDER NOTIFICATION
MD CORRALES paged: patient has been in NSR and then had 2 episodes of bradycardia into the mid 30s tonight once at 2330 and again at 0150. Telemetry techs printed off strips for review. Patient is sleeping/no complaints.  Will continue to monitor and follow POC.

## 2019-09-29 ENCOUNTER — APPOINTMENT (OUTPATIENT)
Dept: PHYSICAL THERAPY | Facility: CLINIC | Age: 59
DRG: 003 | End: 2019-09-29
Payer: COMMERCIAL

## 2019-09-29 LAB
ALBUMIN SERPL-MCNC: 2.7 G/DL (ref 3.4–5)
ALP SERPL-CCNC: 62 U/L (ref 40–150)
ALT SERPL W P-5'-P-CCNC: 26 U/L (ref 0–50)
ANION GAP SERPL CALCULATED.3IONS-SCNC: 8 MMOL/L (ref 3–14)
AST SERPL W P-5'-P-CCNC: 16 U/L (ref 0–45)
BILIRUB SERPL-MCNC: 0.4 MG/DL (ref 0.2–1.3)
BUN SERPL-MCNC: 13 MG/DL (ref 7–30)
CALCIUM SERPL-MCNC: 8.9 MG/DL (ref 8.5–10.1)
CHLORIDE SERPL-SCNC: 110 MMOL/L (ref 94–109)
CO2 SERPL-SCNC: 24 MMOL/L (ref 20–32)
CREAT SERPL-MCNC: 0.68 MG/DL (ref 0.52–1.04)
ERYTHROCYTE [DISTWIDTH] IN BLOOD BY AUTOMATED COUNT: 17.1 % (ref 10–15)
GFR SERPL CREATININE-BSD FRML MDRD: >90 ML/MIN/{1.73_M2}
GLUCOSE SERPL-MCNC: 92 MG/DL (ref 70–99)
HCT VFR BLD AUTO: 31.1 % (ref 35–47)
HGB BLD-MCNC: 9.4 G/DL (ref 11.7–15.7)
INR PPP: 1.95 (ref 0.86–1.14)
LMWH PPP CHRO-ACNC: 0.19 IU/ML
MAGNESIUM SERPL-MCNC: 2.3 MG/DL (ref 1.6–2.3)
MCH RBC QN AUTO: 29.7 PG (ref 26.5–33)
MCHC RBC AUTO-ENTMCNC: 30.2 G/DL (ref 31.5–36.5)
MCV RBC AUTO: 98 FL (ref 78–100)
PHOSPHATE SERPL-MCNC: 3.3 MG/DL (ref 2.5–4.5)
PLATELET # BLD AUTO: 81 10E9/L (ref 150–450)
POTASSIUM SERPL-SCNC: 4.2 MMOL/L (ref 3.4–5.3)
PROT SERPL-MCNC: 6.9 G/DL (ref 6.8–8.8)
RBC # BLD AUTO: 3.17 10E12/L (ref 3.8–5.2)
SODIUM SERPL-SCNC: 142 MMOL/L (ref 133–144)
T3 SERPL-MCNC: 132 NG/DL (ref 60–181)
WBC # BLD AUTO: 7.5 10E9/L (ref 4–11)

## 2019-09-29 PROCEDURE — 25000132 ZZH RX MED GY IP 250 OP 250 PS 637: Performed by: STUDENT IN AN ORGANIZED HEALTH CARE EDUCATION/TRAINING PROGRAM

## 2019-09-29 PROCEDURE — 84480 ASSAY TRIIODOTHYRONINE (T3): CPT | Performed by: PHYSICIAN ASSISTANT

## 2019-09-29 PROCEDURE — 84100 ASSAY OF PHOSPHORUS: CPT | Performed by: INTERNAL MEDICINE

## 2019-09-29 PROCEDURE — 85027 COMPLETE CBC AUTOMATED: CPT | Performed by: INTERNAL MEDICINE

## 2019-09-29 PROCEDURE — 97530 THERAPEUTIC ACTIVITIES: CPT | Mod: GP

## 2019-09-29 PROCEDURE — 25000128 H RX IP 250 OP 636: Performed by: INTERNAL MEDICINE

## 2019-09-29 PROCEDURE — 83735 ASSAY OF MAGNESIUM: CPT | Performed by: INTERNAL MEDICINE

## 2019-09-29 PROCEDURE — 25000132 ZZH RX MED GY IP 250 OP 250 PS 637: Performed by: PHYSICIAN ASSISTANT

## 2019-09-29 PROCEDURE — 80053 COMPREHEN METABOLIC PANEL: CPT | Performed by: INTERNAL MEDICINE

## 2019-09-29 PROCEDURE — 21400000 ZZH R&B CCU UMMC

## 2019-09-29 PROCEDURE — 90682 RIV4 VACC RECOMBINANT DNA IM: CPT | Performed by: INTERNAL MEDICINE

## 2019-09-29 PROCEDURE — 85610 PROTHROMBIN TIME: CPT | Performed by: INTERNAL MEDICINE

## 2019-09-29 PROCEDURE — 36415 COLL VENOUS BLD VENIPUNCTURE: CPT | Performed by: PHYSICIAN ASSISTANT

## 2019-09-29 PROCEDURE — 85520 HEPARIN ASSAY: CPT | Performed by: INTERNAL MEDICINE

## 2019-09-29 PROCEDURE — 36415 COLL VENOUS BLD VENIPUNCTURE: CPT | Performed by: INTERNAL MEDICINE

## 2019-09-29 PROCEDURE — 25000132 ZZH RX MED GY IP 250 OP 250 PS 637: Performed by: INTERNAL MEDICINE

## 2019-09-29 PROCEDURE — 25000128 H RX IP 250 OP 636: Performed by: PHYSICIAN ASSISTANT

## 2019-09-29 PROCEDURE — 99233 SBSQ HOSP IP/OBS HIGH 50: CPT | Performed by: PHYSICIAN ASSISTANT

## 2019-09-29 PROCEDURE — 25000132 ZZH RX MED GY IP 250 OP 250 PS 637: Performed by: SURGERY

## 2019-09-29 PROCEDURE — 97116 GAIT TRAINING THERAPY: CPT | Mod: GP

## 2019-09-29 RX ORDER — DULOXETIN HYDROCHLORIDE 30 MG/1
60 CAPSULE, DELAYED RELEASE ORAL DAILY
Status: DISCONTINUED | OUTPATIENT
Start: 2019-09-30 | End: 2019-10-07 | Stop reason: HOSPADM

## 2019-09-29 RX ADMIN — MULTIPLE VITAMINS W/ MINERALS TAB 1 TABLET: TAB at 08:29

## 2019-09-29 RX ADMIN — PANTOPRAZOLE SODIUM 40 MG: 40 TABLET, DELAYED RELEASE ORAL at 16:00

## 2019-09-29 RX ADMIN — ACETAMINOPHEN 650 MG: 325 TABLET, FILM COATED ORAL at 08:29

## 2019-09-29 RX ADMIN — OXYCODONE HYDROCHLORIDE 5 MG: 5 TABLET ORAL at 08:29

## 2019-09-29 RX ADMIN — MICONAZOLE NITRATE: 20 POWDER TOPICAL at 08:32

## 2019-09-29 RX ADMIN — Medication 9 MG: at 18:30

## 2019-09-29 RX ADMIN — FUROSEMIDE 40 MG: 10 INJECTION, SOLUTION INTRAVENOUS at 08:29

## 2019-09-29 RX ADMIN — INFLUENZA A VIRUS A/BRISBANE/02/2018 (H1N1) RECOMBINANT HEMAGGLUTININ ANTIGEN, INFLUENZA A VIRUS A/KANSAS/14/2017 (H3N2) RECOMBINANT HEMAGGLUTININ ANTIGEN, INFLUENZA B VIRUS B/PHUKET/3073/2013 RECOMBINANT HEMAGGLUTININ ANTIGEN, AND INFLUENZA B VIRUS B/MARYLAND/15/2016 RECOMBINANT HEMAGGLUTININ ANTIGEN 0.5 ML: 45; 45; 45; 45 INJECTION INTRAMUSCULAR at 11:56

## 2019-09-29 RX ADMIN — DULOXETINE HYDROCHLORIDE 30 MG: 30 CAPSULE, DELAYED RELEASE ORAL at 08:29

## 2019-09-29 RX ADMIN — ATORVASTATIN CALCIUM 40 MG: 40 TABLET, FILM COATED ORAL at 20:06

## 2019-09-29 RX ADMIN — METOPROLOL TARTRATE 25 MG: 25 TABLET ORAL at 20:05

## 2019-09-29 RX ADMIN — HEPARIN SODIUM 1650 UNITS/HR: 10000 INJECTION, SOLUTION INTRAVENOUS at 02:02

## 2019-09-29 RX ADMIN — PANTOPRAZOLE SODIUM 40 MG: 40 TABLET, DELAYED RELEASE ORAL at 08:30

## 2019-09-29 RX ADMIN — OXYCODONE HYDROCHLORIDE 5 MG: 5 TABLET ORAL at 13:24

## 2019-09-29 RX ADMIN — THIAMINE HCL TAB 100 MG 100 MG: 100 TAB at 08:30

## 2019-09-29 RX ADMIN — METOPROLOL TARTRATE 25 MG: 25 TABLET ORAL at 08:30

## 2019-09-29 ASSESSMENT — MIFFLIN-ST. JEOR: SCORE: 2335.14

## 2019-09-29 ASSESSMENT — ACTIVITIES OF DAILY LIVING (ADL)
ADLS_ACUITY_SCORE: 13

## 2019-09-29 ASSESSMENT — PAIN DESCRIPTION - DESCRIPTORS
DESCRIPTORS: ACHING;SORE

## 2019-09-29 NOTE — PLAN OF CARE
Discharge Planner PT   Patient plan for discharge: Unknown  Current status: Mod A for bed mobility, min A for sit<>stands and amb bouts of 15', 20' with wide FWW. O2 sats 88-91% on RA with activity. Fatigues quickly. HR into 80s with activity as well.  Barriers to return to prior living situation: Deconditioning, fall risk  Recommendations for discharge: ARC  Rationale for recommendations: Current level of function       Entered by: Toan Holguin 09/29/2019 12:34 PM

## 2019-09-29 NOTE — PLAN OF CARE
Shift: 7502-1548  VS: VSS on RA  Pain:Tylenol x1 and oxycodone x2 for suture removal and chest pain/soreness due to cough.   Neuro: A&Ox4, calls appropriately  Cardiac:  NSR with HR in 70s  Respiratory: Sating >94% on RA, dyspneic with activities.  GI/Diet/Appetite: Good oral intake, no nausea reported  :  Adequate UO post lasix tx, no BM on shift   LDA's: Heparin discontinued; PIV SL.  Skin: Wound care to bilateral groin redness/open areas. Neck suture removed.   Activity: Up to commode with Ax1-2 with walker; Lift as needed to reposition. Pt up in recliner right now.   Worked with PT this morning, friend came by and helped with shower. A few friends at bedside now.    Plan: Continue to monitor closely and update MD with any changes. Groin sutures to be removed Monday or Tuesday and possible discharge in a few days to TCU.

## 2019-09-29 NOTE — PROGRESS NOTES
[facility]   Cardiology Consults  Progress Note     Interval History:  - no acute events overnight. No runs of a-fib  - Endorses dyspnea with activity, and continued pain with deep inspiration. Denies nausea, leg edema, orthopnea,     Physical Exam:  Temp:  [97.6  F (36.4  C)-98.2  F (36.8  C)] 97.8  F (36.6  C)  Pulse:  [78-85] 85  Heart Rate:  [70-83] 77  Resp:  [16-20] 20  BP: (126-133)/(55-74) 131/74  SpO2:  [94 %-99 %] 95 %      Intake/Output Summary (Last 24 hours) at 9/29/2019 0832  Last data filed at 9/29/2019 0659  Gross per 24 hour   Intake 836 ml   Output 1000 ml   Net -164 ml       Wt:   Wt Readings from Last 5 Encounters:   09/29/19 (!) 173.9 kg (383 lb 4.8 oz)   09/09/19 (!) 178.6 kg (393 lb 11.9 oz)   08/12/19 (!) 181.4 kg (400 lb)   06/26/19 (!) 191 kg (421 lb)   05/23/19 (!) 195.9 kg (431 lb 14.4 oz)     GEN: NAD, awake, alert, pleasant  Pulm: Bibasilar crackles, no cough, no wheeze  Cardiac: JVP difficult to assess secondary to body habitus, no murmurs, appears RRR  Vascular: No apparent LE edema,palpable pulses  GI: soft, non distended, protuberant abdomen   Ext: ECMO site c/d/i, no hematoma, no drainage, no erythema. Bandages over panus wounds c/d/i   Neuro: CN II-XII grossly intact    Medications:    atorvastatin  40 mg Oral QPM     DULoxetine  30 mg Oral Daily     furosemide  40 mg Intravenous Once     influenza vaccine adult (product based on age)  0.5 mL Intramuscular Prior to discharge     metoprolol tartrate  25 mg Oral BID     miconazole   Topical BID     multivitamin w/minerals  1 tablet Oral or Feeding Tube Daily     pantoprazole  40 mg Oral BID AC     sodium chloride (PF)  3 mL Intracatheter Q8H     vitamin B1  100 mg Oral or Feeding Tube Daily       IV fluid REPLACEMENT ONLY 65 mL/hr at 09/22/19 2155     IV fluid REPLACEMENT ONLY       HEParin 1,650 Units/hr (09/29/19 0202)     - MEDICATION INSTRUCTIONS -       - MEDICATION INSTRUCTIONS -       -  MEDICATION INSTRUCTIONS -       Warfarin Therapy Reminder         Labs:   CMP  Recent Labs   Lab 09/29/19  0614 09/28/19  0705 09/27/19  1458 09/27/19  0628 09/26/19  0655    142  --  142 143   POTASSIUM 4.2 3.9 3.4 3.3* 3.4   CHLORIDE 110* 110*  --  108 109   CO2 24 23  --  26 25   ANIONGAP 8 9  --  8 9   GLC 92 96  --  94 90   BUN 13 15  --  16 20   CR 0.68 0.77  --  0.69 0.78   GFRESTIMATED >90 85  --  >90 84   GFRESTBLACK >90 >90  --  >90 >90   MARIA INES 8.9 8.9  --  8.7 8.7   MAG 2.3 2.2  --  2.3 2.4*   PHOS 3.3 3.1  --  3.5 3.1   PROTTOTAL 6.9 6.8  --  7.0 6.7*   ALBUMIN 2.7* 2.5*  --  2.6* 2.5*   BILITOTAL 0.4 0.3  --  0.3 0.3   ALKPHOS 62 66  --  64 65   AST 16 9  --  19 14   ALT 26 20  --  25 23     CBC  Recent Labs   Lab 09/29/19  0614 09/28/19  0705 09/27/19  1458 09/27/19  1119 09/27/19  0628 09/26/19  0655   WBC 7.5 8.7  --   --  9.2 9.1   RBC 3.17* 3.14*  --   --  3.44* 3.24*   HGB 9.4* 9.2*  --   --  10.1* 9.4*   HCT 31.1* 30.6*  --   --  33.3* 31.3*   MCV 98 98  --   --  97 97   MCH 29.7 29.3  --   --  29.4 29.0   MCHC 30.2* 30.1*  --   --  30.3* 30.0*   RDW 17.1* 17.0*  --   --  16.5* 16.3*   PLT 81* 75* 81* Platelets clumped, platelet count unavailable Platelets clumped, platelet count unavailable 116*     INR  Recent Labs   Lab 09/29/19 0614 09/28/19  0705 09/27/19  0628 09/26/19  0725   INR 1.95* 1.65* 1.57* 1.02     Arterial Blood Gas  Recent Labs   Lab 09/23/19  1231 09/23/19  0300 09/22/19  2130 09/22/19  1549   PH 7.47* 7.44 7.44 7.46*   PCO2 44 48* 49* 47*   PO2 57* 75* 71* 73*   HCO3 32* 33* 33* 33*   O2PER 3L 3L 3.5L 30       Diagnostics:    Echo 9/22/2019:  Interpretation Summary  Technically difficult study.  Left ventricular function, chamber size, wall motion, and wall thickness are  normal.The EF is 55-60%.  Right ventricular function, chamber size, wall motion, and thickness are  normal.  No pericardial effusion is  present.  _____________________________________________________________________________    Left Ventricle  Left ventricular function, chamber size, wall motion, and wall thickness are  normal.The EF is 55-60%.     Right Ventricle  Right ventricular function, chamber size, wall motion, and thickness are  normal.     Mitral Valve  Mild to moderate mitral annular calcification is present. Trace mitral  insufficiency is present.     Tricuspid Valve  Trace tricuspid insufficiency is present. The peak velocity of the tricuspid  regurgitant jet is not obtainable.        Vessels  The aorta root is normal. Unable to assess mean RA pressure given the patient  is on a ventilator.     Pericardium  No pericardial effusion is present.    Stress test:    Coronary angiogram:    ASSESSMENT/PLAN:  Shira Rodriguez is a 59 F with a history of hypertension, hyperlipidemia, sleep apnea, morbid obesity, and chronic bronchitis who presented to [facility] with massive PE with following PEA arrest 9/9 and ROSC after intubation/resusitation.    #PEA arrest secondary to massive pulmonary embolism  ##Hypoxic respiratory failure - improving  ###Volume overload - improving  Transferred to [facility] for VA ECMO, and IR thrombectomy and catheter directed lytics on 9/9, went for VA ECMO decannulation on 9/17 complicated by hypercarbic respiratory failure requiring placement of VV ECMO through RIJ-FV bypass. Now VV ECMO s/p decannulation on 9/19. Off sedation 9/22, extubated 9/22. Repeat CT with no residual clot in PA. RV function improved to normal. She is being bridged to a therapeutic INR and has minimal oxygen requirements at this stage s/p diuresis - requiring with activity and at night. CXR 9/27 with continued pulmonary edema. Will continue with intermittent lasix.  - Left groin incision with retention sutures. Should be removed 9/30/2019. Will need wound care order at discharge if she leaves before then  - INR 1.95 - will stop heparin as she is near  therapeutic  - Warfarin, goal INR 2-3. Pharmacy to dose  - Duonebs PRN  - Acapella qid   - Wean oxygen as able  - Lasix 40mg IV this AM    #Afib/Aflutter  Noted to be in atrial fibrillation while in the ICU and on dobutamine, which was thought to be exacerbating factor. Dobutamine discontinued 9/23 and patient was given amiodarone bolus at that time. She converted to sinus. On 9/25 she went into atrial flutter with variable block. Again was given an amio bolus, fluid bolus, and IV metop x1. Continues to intermittently go into a-fib - rates now controlled.  - metoprolol to 25mg bid  - warfarin as above    #COPD  ##History of tobacco abuse  ###Aspiration pneumonia - resolved  Sputum cultures:   - 9/11 MSSA: vancomycin/zosyn x5 days for ECMO, vanc last dose 9/15, cefazolin on 9/17-9/22   - 9/20 Serratia (ceftriaxone susceptible): Ceftriaxone 9/23-9/28   - s/p prednisone burst    #Thrombocytopenia  Thrombocytopenia 60-80k baseline 180k   Had precipitous drop in platelets after admission - suspected due to consumption. 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   - CBC daily    #History of depression  Cymbalta held while in ICU. Will resume at lower dose over next few days, and increase to PTA dose. Additionally reports that she may have been on wellbutrin, however, notes regarding this drug state she had increased crying episodes while on this medication and it appears to have been stopped.  - Tolerating lower dose cymbalta - will increase to PTA dose tomorrow    #HTN   - metoprolol as above  - Will resume PTA lisinopril pending BP    #HLD  - Atorvastatin 40mg    #CYRUS   - Oxygen at night PRN to keep O2 sats >90%.   - has CPAP at home. Refusing CPAP while inpatient.     Patient discussed with Dr. Stuart, who agrees with above plan.      Torsten Foster PA-C  [facility] Cardiology Team

## 2019-09-29 NOTE — INTERIM SUMMARY
[facility] Pre-Admission Screen    Referral Source: [address]  Admit date to referring facility: 9/9/2019    Rehab Diagnosis:    09 Cardiac:PEA arrest 2/2 massive pulmonary embolism c/b hypoxic respiratory failure    Justification for Acute Inpatient Rehabilitation  59 year old female with PMHx most significant for RA, HTN, HLD, CYRUS, obesity and chronic bronchitis that presented to [facility] on 9/9/2019 with a massive PE with PEA arrest.  Now s/p mechanical thrombectomy, lytics, VA ECMO (decannulated 9/17), as well as VV ECMO (decannulated 9/19).  She developed aspiration pneumonia.  Extubated on 9/22. LLL atelectasis vs necrotizing infection seen in on CT on 9/21. A small to moderate sized right pleural effusion noted so thoracentesis was completed 10/4. Procedure was complicated by dark maroon hemorrhagic blood drawn back . Pleural fluid sampled.  No evidence of infection and has complete course of abx. It is recommeneded that repeat CT be completed in 4-6 weeks to monitor LLL consolidation/effusion. Pulmonary consulted and suspects necrosis of lung parenchyma after PE and complicating effusion.  Patient has required diuresing management in setting of acute heart failure (improving), volume overload, and B pleural effusions.  Pt requires an intensive inpatient rehab program to receive intense therapies to address acute new functional impairments including decreased strength, impaired ADL s, impaired balance, impaired cognition, impaired mobility, impaired activity tolerance, fall risk and w/BMI of 61.30kg/m2. Pt is medically stable and ready for discharge to IRF.       Current Active Medical Management Needs/Risks for Clinical Complications  The patient requires the high level of rehabilitation physician supervision that accompanies the provision of intensive rehabilitation therapy.  The patient needs the services of the rehabilitation physician to assess the patient medically and functionally and to  modify the course of treatment as needed to maximize the patient's capacity to benefit from the rehabilitation process. Manage & assess pulmonary status as pt at risk for pulmonary compromise in setting hypoxic respiratory failure, PEA arrest secondary to massive pulmonary embolism, and LLL opacity: Continue flutter valve for help with expectoration, Consider addition of saline nebs to assist with mobilization of thick secretions, Monitor pleural fluid culture, if (+) would need chest tube, duonebs; manage & assess anticoagulation-heparin drip stopped 10/6, on coumadin; manage and assess cardiovascular status, fluid and electrolytes and titrate based on I/O in status of volume overload and acute heart failure - on lasix; manage & assess wound healing and signs of infection in setting of pannus wound and L groin cannula site - perform wound cares as needed; Manage & assess cardiac function-Ziopatch x14 days; Monitor Thrombocytopenia-CBC daily; manage & assess mood-Cymbalta; manage & assess HTN- lisinopril, holding PTA spironolactone. Pt will require longer rehab course d/t morbid obesity.     Past Medical/Surgical History  Surgery in the past 100 days: Yes  Additional relevant past medical history: Hypertension, Hyperlipidemia, COPD/Pulmonary (chronic bronchitis), Osteoarthritis, Depression/Anxiety, TAWNY, L TKA, RA contact dermatitis, eczema, morbid obesity, sleep apnea, and Tobacco Use Disorder.    Level of Functioning prior to Admission:  Home Environment  Lives with: alone  Living arrangements: apartment  Home accessibility: no concerns  Equipment currently used at home: none  Activity/exercise/self-care comment: Pt reports exercising in apt pool a few times a week for up to an hour.   Ambulation: 0-->independent in apt and community, mod I-uses a walker for the long hallway in her apartment to get to her car, but will leave the walker in her parking space. Pt stated that she uses it d/t back pain.   Transferring:  0-->independent  Toiletin-->independent  Bathin-->independent  Dressin-->independent   Eatin-->independent  Communication: 0-->understands/communicates without difficulty  Swallowin-->swallows foods/liquids without difficulty  Cognition: 0 - no cognition issues reported  Prior Functional Level Comment: Sleeps in recliner, more sedentary since he stopped working ~ 6 months ago ([facility] nursing program).       Current Level of Function grid:   PT Most Recent Goals for Rehab   Bed Rolling 0 Not completed 6 Independent   Supine to Sit 3 Partial/moderate assistance, deflated bed 6 Independent   Sit to Stand 4 Supervision or touching assitance 6 Independent   Transfer 4 Supervision or touching assitance 6 Independent   Ambulation 4 Supervision or touching assitance 6 Independent   Stairs 0 Not completed 6 Independent     OT Most Recent Goals for Rehab   Feeding 5 Setup or clean-up assistance 6 Independent   Grooming 5 Setup or clean-up assistance 6 Independent   Bathing 2 Substantial/maximal assistance 6 Independent   Upper Body Dressing 3 Partial/moderate assistance 6 Independent   Lower Body Dressing 2 Substantial/maximal assistance 6 Independent   Toileting 3 Partial/moderate assistance 6 Independent   Toilet Transfer 4 Supervision or touching assitance 6 Independent   Tub/Shower Transfer 4 Supervision or touching assitance 6 Independent   Cognitive Impaired: [place] 24/30 mild, A&Ox4 6 Independent     SLP Most Recent Goals for Rehab   Dysphagia Regular with thin Regular with thin       Summary Statement:  OT: Ambulates ~10 feet to and from wheelchair in hallway with 2WW & SBA. Pt completes transfer on and off shower chair with SBA-CGA and use of handrails. Good safety awareness in shower room with slow movements and awareness of when feeling as if slipping on shower chair. Towel placed on chair to improve comfort. Able to complete hair grooming, UB/underarm washing with set-up of soap/wash cloth and SBA.  Max A required to wash back/bottom, under pannus and distal LE's.  Does require rest breaks throughout shower but is able to pace self. Dons gown with set-up; assist to tie. Max A to don socks in shower environment. Simulating home set-up for donning socks once back in room (patient sitting on chair with chair of equal height immediately across). IND with L sock using modified techniques; Max A to don R sock this date. IND with hair grooming. MoCA administered 09/27 with patient scoring 24/30, indicating mild cognitive deficits. Score >26/30 considered WNL. Greatest areas of deficit include visuospatial/executive (3/5), delayed recall (3/5). Would recommend further cognitive assessment.     PT:pt ambulates 20ft + 70ft x3 with FWW, SBA, and wheelchair follow. Pt uses 1L O2 throughout gait to keep Spo2 > 90%. Seated rest breaks needed due to fatigue. Pt needs seated rest break after toileting with RN staff. Pivoted chair<>commode without AD and CGA for safety. Supine>sit with mod A. Mattress deflated. Administed the The 30 Second Sit to Stand Test 10/2 which is considered a test of fall risk. Pt scored 3 which places her at a high fall risk.     Expected Therapies and Services required during Inpatient Rehab admission  Intensity of Therapy: Patient requires intensive therapies not available in a lesser level of care. Patient is motivated, making gains, and can tolerate 3 hours of therapy a day.  Physical Therapy: 90 minutes per day, at least 5 days a week for 7 days  Occupational Therapy: 90 minutes per day, at least 5 days a week for 7 days  Rehabilitation Nursing Needs: Patient requires 24 hour Rehab Nursing to manage and assess: vitals, provide a safe environment for a patient at risk for falls, skin breakdown, and infection; perform wound care to pannus and L groin wound, provide medication education and carryover of new rehab techniques to patient to facilitate safe disch home.      Precautions/restrictions/special needs:   Precautions: fall precautions      Special Needs: bariatric equipment :  Currently on bariatric bed w/ pulsate mattress.    Expected Level of Improvement:: IND for ambulation, stairs, transfers and ADL's  Expected Length of time to achieve: 7 days    Anticipated Discharge Needs:  Anticipated Discharge Destination: Home  Anticipated Discharge Support: Family members  24/7 support available : Yes  Identified caregiver(s):  Mother. Pt's sister is also able to assist and pt could also go to stay at her sisters house if needed.   Anticipated Discharge Needs: cardiac/pulmonary rehab      Liaison Signature:         Physician statement of review and agreement:  I have reviewed and am in agreement of the need for IRF stay to address above functional and medical needs. In addition to above statements address, Patient requires intensive active and ongoing therapeutic intervention and multiple therapies; Patient requires medical supervision; Expected to actively participate in the intensive rehab program; Sufficiently stable to actively participate; Expectation for measurable improvement in functional capacity or adaption to impairments.

## 2019-09-29 NOTE — PLAN OF CARE
Temp: 97.8  F (36.6  C) Temp src: Oral BP: 131/74 Pulse: 85 Heart Rate: 77 Resp: 20 SpO2: 95 % O2 Device: Nasal cannula Oxygen Delivery: 2 LPM    D: PEA arrest 2/2 PE 9/9/19 s/p thrombectomy and ECMO. Hx HTN, HLD, CYRUS, obesity.    I/A: Monitored vitals and assessed pt status. A&Ox4. VSS see flowsheet. SB/SR, x2 episodes susan to high 20s, crosscover notified. 1-2L O2. SANTOS. Reports midsternal pain, prn tylenol and oxycodone x1. K replaced per protocol. Heparin gtt at 1,650 units/hr. 10A recheck in am. Voiding adequate amount in bedside commode. Up with assist 1-2, walker, gait belt, mechanical lift. Sleeping between cares.    P: Continue to monitor and follow POC. Notify CSI with changes.

## 2019-09-30 ENCOUNTER — APPOINTMENT (OUTPATIENT)
Dept: OCCUPATIONAL THERAPY | Facility: CLINIC | Age: 59
DRG: 003 | End: 2019-09-30
Payer: COMMERCIAL

## 2019-09-30 ENCOUNTER — APPOINTMENT (OUTPATIENT)
Dept: PHYSICAL THERAPY | Facility: CLINIC | Age: 59
DRG: 003 | End: 2019-09-30
Payer: COMMERCIAL

## 2019-09-30 LAB
ALBUMIN SERPL-MCNC: 2.7 G/DL (ref 3.4–5)
ALP SERPL-CCNC: 64 U/L (ref 40–150)
ALT SERPL W P-5'-P-CCNC: 26 U/L (ref 0–50)
ANION GAP SERPL CALCULATED.3IONS-SCNC: 8 MMOL/L (ref 3–14)
AST SERPL W P-5'-P-CCNC: 20 U/L (ref 0–45)
BILIRUB SERPL-MCNC: 0.3 MG/DL (ref 0.2–1.3)
BUN SERPL-MCNC: 14 MG/DL (ref 7–30)
CALCIUM SERPL-MCNC: 9.2 MG/DL (ref 8.5–10.1)
CHLORIDE SERPL-SCNC: 109 MMOL/L (ref 94–109)
CO2 SERPL-SCNC: 25 MMOL/L (ref 20–32)
CREAT SERPL-MCNC: 0.64 MG/DL (ref 0.52–1.04)
ERYTHROCYTE [DISTWIDTH] IN BLOOD BY AUTOMATED COUNT: 17.3 % (ref 10–15)
GFR SERPL CREATININE-BSD FRML MDRD: >90 ML/MIN/{1.73_M2}
GLUCOSE SERPL-MCNC: 100 MG/DL (ref 70–99)
HCT VFR BLD AUTO: 32.2 % (ref 35–47)
HGB BLD-MCNC: 9.8 G/DL (ref 11.7–15.7)
INR PPP: 2.19 (ref 0.86–1.14)
INTERPRETATION ECG - MUSE: NORMAL
MAGNESIUM SERPL-MCNC: 2.2 MG/DL (ref 1.6–2.3)
MCH RBC QN AUTO: 29.4 PG (ref 26.5–33)
MCHC RBC AUTO-ENTMCNC: 30.4 G/DL (ref 31.5–36.5)
MCV RBC AUTO: 97 FL (ref 78–100)
PHOSPHATE SERPL-MCNC: 4 MG/DL (ref 2.5–4.5)
PLATELET # BLD AUTO: 103 10E9/L (ref 150–450)
POTASSIUM SERPL-SCNC: 4 MMOL/L (ref 3.4–5.3)
PROT SERPL-MCNC: 7.1 G/DL (ref 6.8–8.8)
RBC # BLD AUTO: 3.33 10E12/L (ref 3.8–5.2)
SODIUM SERPL-SCNC: 142 MMOL/L (ref 133–144)
WBC # BLD AUTO: 6.8 10E9/L (ref 4–11)

## 2019-09-30 PROCEDURE — 83735 ASSAY OF MAGNESIUM: CPT | Performed by: INTERNAL MEDICINE

## 2019-09-30 PROCEDURE — 25000128 H RX IP 250 OP 636: Performed by: PHYSICIAN ASSISTANT

## 2019-09-30 PROCEDURE — 97530 THERAPEUTIC ACTIVITIES: CPT | Mod: GP

## 2019-09-30 PROCEDURE — 25000132 ZZH RX MED GY IP 250 OP 250 PS 637: Performed by: SURGERY

## 2019-09-30 PROCEDURE — 25000132 ZZH RX MED GY IP 250 OP 250 PS 637: Performed by: PHYSICIAN ASSISTANT

## 2019-09-30 PROCEDURE — 97110 THERAPEUTIC EXERCISES: CPT | Mod: GP

## 2019-09-30 PROCEDURE — 85610 PROTHROMBIN TIME: CPT | Performed by: INTERNAL MEDICINE

## 2019-09-30 PROCEDURE — 99223 1ST HOSP IP/OBS HIGH 75: CPT | Mod: GC | Performed by: INTERNAL MEDICINE

## 2019-09-30 PROCEDURE — 21400000 ZZH R&B CCU UMMC

## 2019-09-30 PROCEDURE — 36415 COLL VENOUS BLD VENIPUNCTURE: CPT | Performed by: INTERNAL MEDICINE

## 2019-09-30 PROCEDURE — 84100 ASSAY OF PHOSPHORUS: CPT | Performed by: INTERNAL MEDICINE

## 2019-09-30 PROCEDURE — 99207 ZZC NO CHARGE LOS: CPT | Performed by: INTERNAL MEDICINE

## 2019-09-30 PROCEDURE — 97116 GAIT TRAINING THERAPY: CPT | Mod: GP

## 2019-09-30 PROCEDURE — 25000132 ZZH RX MED GY IP 250 OP 250 PS 637: Performed by: STUDENT IN AN ORGANIZED HEALTH CARE EDUCATION/TRAINING PROGRAM

## 2019-09-30 PROCEDURE — 97535 SELF CARE MNGMENT TRAINING: CPT | Mod: GO

## 2019-09-30 PROCEDURE — 25000132 ZZH RX MED GY IP 250 OP 250 PS 637: Performed by: INTERNAL MEDICINE

## 2019-09-30 PROCEDURE — 97530 THERAPEUTIC ACTIVITIES: CPT | Mod: GO

## 2019-09-30 PROCEDURE — 80053 COMPREHEN METABOLIC PANEL: CPT | Performed by: INTERNAL MEDICINE

## 2019-09-30 PROCEDURE — 85027 COMPLETE CBC AUTOMATED: CPT | Performed by: INTERNAL MEDICINE

## 2019-09-30 RX ORDER — FUROSEMIDE 10 MG/ML
40 INJECTION INTRAMUSCULAR; INTRAVENOUS ONCE
Status: COMPLETED | OUTPATIENT
Start: 2019-09-30 | End: 2019-09-30

## 2019-09-30 RX ORDER — WARFARIN SODIUM 10 MG/1
10 TABLET ORAL
Status: COMPLETED | OUTPATIENT
Start: 2019-09-30 | End: 2019-09-30

## 2019-09-30 RX ADMIN — PANTOPRAZOLE SODIUM 40 MG: 40 TABLET, DELAYED RELEASE ORAL at 09:05

## 2019-09-30 RX ADMIN — ATORVASTATIN CALCIUM 40 MG: 40 TABLET, FILM COATED ORAL at 19:48

## 2019-09-30 RX ADMIN — MICONAZOLE NITRATE: 20 POWDER TOPICAL at 09:06

## 2019-09-30 RX ADMIN — POTASSIUM CHLORIDE 20 MEQ: 750 TABLET, EXTENDED RELEASE ORAL at 13:18

## 2019-09-30 RX ADMIN — METOPROLOL TARTRATE 25 MG: 25 TABLET ORAL at 09:05

## 2019-09-30 RX ADMIN — DULOXETINE HYDROCHLORIDE 60 MG: 30 CAPSULE, DELAYED RELEASE ORAL at 09:05

## 2019-09-30 RX ADMIN — THIAMINE HCL TAB 100 MG 100 MG: 100 TAB at 09:05

## 2019-09-30 RX ADMIN — MICONAZOLE NITRATE: 20 POWDER TOPICAL at 19:48

## 2019-09-30 RX ADMIN — FUROSEMIDE 40 MG: 10 INJECTION, SOLUTION INTRAVENOUS at 13:02

## 2019-09-30 RX ADMIN — MULTIPLE VITAMINS W/ MINERALS TAB 1 TABLET: TAB at 09:05

## 2019-09-30 RX ADMIN — ACETAMINOPHEN 650 MG: 325 TABLET, FILM COATED ORAL at 19:48

## 2019-09-30 RX ADMIN — PANTOPRAZOLE SODIUM 40 MG: 40 TABLET, DELAYED RELEASE ORAL at 17:19

## 2019-09-30 RX ADMIN — WARFARIN SODIUM 10 MG: 10 TABLET ORAL at 17:19

## 2019-09-30 ASSESSMENT — ACTIVITIES OF DAILY LIVING (ADL)
ADLS_ACUITY_SCORE: 13
ADLS_ACUITY_SCORE: 12
ADLS_ACUITY_SCORE: 15
ADLS_ACUITY_SCORE: 15
ADLS_ACUITY_SCORE: 12
ADLS_ACUITY_SCORE: 12

## 2019-09-30 ASSESSMENT — MIFFLIN-ST. JEOR: SCORE: 2327.43

## 2019-09-30 NOTE — CONSULTS
Cardiology Consult                                                               2019  Shira Rodriguez MRN: [id number]  Age: 59 year old, : 1960          Reason for consult:       pause          Assessment and Recommendation:      In summary, this is a 59-year-old female with a history of morbid obesity and metabolic syndrome who presented with a massive pulmonary embolism complicated by PEA cardiac arrest status post both VA ECMO and VV ECMO with whom electrophysiology is consulted for sinus pause.     1.  Nocturnal bradycardia: The patient was noted to have a 3.6-second pause in sinus rhythm.  She is currently on beta-blockade, was sleeping, and was asymptomatic at the time.  These reasons, this requires no further evaluation.  However, given that the patient has had a 8-second pause in the outpatient setting, it would be reasonable to stop her AV temi blockers (metoprolol).     2.  Paroxysmal atrial fibrillation: CHADSVasc 3.  The patient is currently in sinus rhythm.  As above, recommend stopping AV etmi blockers.     Plan:  -Stop metoprolol  -Outpatient CPAP/CYRUS optimization  -Please discharge patient with Zio to ensure the patient is not having frequent episodes of RVR as she was noted to be asymptomatic with these while hospitalized  -Continue anticoagulation with a goal INR of 2-3  -Follow-up with Dr. Valadez     Patient discussed with staff attending, Dr. Magana and the note reflects our joint plan. Thank you for consulting the cardiovascular services at the [facility]. Please do not hesitate to call with questions or concerns.      Misael Kaiser MD    PGY-5, Cardiology Fellow  Pager: [phone]          History of Present Illness:      The patient is a 59-year-old female with a past medical history notable for morbid obesity, obstructive sleep apnea, metabolic syndrome, and hyperlipidemia who presented with a massive pulmonary embolism.  Her hospital  course was complicated by PEA cardiac arrest.  She underwent VA ECMO placement and ultimately had catheter directed thrombolytics on 9/9.  Her hospital course was further complicated by respiratory failure following VA ECMO decannulation for which she underwent VV ECMO placement.  She later was decannulated on 9/19.  Follow-up CT scans showed no evidence of residual thrombus and her echocardiogram has shown return to normal RV function.  Her hospital course has also been complicated by atrial fibrillation.  She was pharmacologically converted with IV amiodarone initially.  The last event of this was on 9/25.  Following this, she was started on metoprolol.  Overnight, the patient developed a 3.6-second pause.  She was in sinus rhythm at the time.  She has had additional 2 to 3-second pauses.  She was sleeping and was asymptomatic.  Overall, she feels well and has no complaints.     More distantly, the patient had an 8-second vagally mediated pause in April during a sleep study.  She later went on to see electrophysiology who recommended AV temi blockers, however no other intervention was recommended.     A comprehensive 10 point review of systems was completed and relevant findings are noted in the HPI.       Past Medical History:          Patient Active Problem List   Diagnosis     Primary localized osteoarthrosis, pelvic region and thigh     Adjustment disorder with depressed mood     Tobacco use disorder     Morbid obesity (H)     Arthritis of knee, degenerative     Degeneration of cervical intervertebral disc     CYRUS (obstructive sleep apnea)     Elevated blood pressure (not hypertension)     Pulmonary emboli (H)     Rheumatoid arthritis (H)           Past Surgical History:      Past Surgical History         Past Surgical History:   Procedure Laterality Date     ARTHROPLASTY KNEE   6/14/2012     Procedure: ARTHROPLASTY KNEE;  Left Total Knee Arthroplasty;  Surgeon: Annette Quesada MD;  Location:  OR      ARTHROSCOPY HIP Right       BRONCHOSCOPY FLEXIBLE AND RIGID N/A 9/17/2019     Procedure: Bronchoscopy flexible;  Surgeon: Patrick Rayo MD;  Location: [facility] OR     C TOTAL HIP ARTHROPLASTY   07/09/04     RT     CV EXTRACORPERAL MEMBRANE OXYGENATION N/A 9/9/2019     Procedure: Extracorporeal Membrance Oxygenation;  Surgeon: Kaleb Mcfarlane MD;  [facility]     INSERT EXTRACORPORAL MEMBRANE OXYGENATOR N/A 9/17/2019     Procedure: Venous-Venous cannulation using ultrasound guidance and transesophageal echocardiogram, venous-arterial ecmo decannulation and repair of left femoral artery;  Surgeon: Patrick Rayo MD;  Location: [facility] OR     IR PULMONARY ANGIOGRAM BILATERAL   9/10/2019              Social History:      Social History   Social History            Socioeconomic History     Marital status: Single       Spouse name: Not on file     Number of children: Not on file     Years of education: Not on file     Highest education level: Not on file   Occupational History     Occupation: registered nurse       Comment: [employer]   Social Needs     Financial resource strain: Not on file     Food insecurity:       Worry: Not on file       Inability: Not on file     Transportation needs:       Medical: Not on file       Non-medical: Not on file   Tobacco Use     Smoking status: Current Every Day Smoker       Packs/day: 1.00       Years: 33.00       Pack years: 33.00       Types: Cigarettes       Start date: 1/1/1982     Smokeless tobacco: Never Used     Tobacco comment: maybe   Substance and Sexual Activity     Alcohol use: Yes       Alcohol/week: 0.0 standard drinks       Comment: occassional     Drug use: No     Sexual activity: Never   Lifestyle     Physical activity:       Days per week: Not on file       Minutes per session: Not on file     Stress: Not on file   Relationships     Social connections:       Talks on phone: Not on file       Gets together: Not on file       Attends  "Muslim service: Not on file       Active member of club or organization: Not on file       Attends meetings of clubs or organizations: Not on file       Relationship status: Not on file     Intimate partner violence:       Fear of current or ex partner: Not on file       Emotionally abused: Not on file       Physically abused: Not on file       Forced sexual activity: Not on file   Other Topics Concern     Parent/sibling w/ CABG, MI or angioplasty before 65F 55M? Not Asked   Social History Narrative     Not on file              Family History:      Family History         Family History   Problem Relation Age of Onset     Thyroid Disease Mother       Hypertension Mother       Skin Cancer Mother           MMohs surgery with skin graft     Cerebrovascular Disease Mother           CVA after endarderectomy     Diabetes Mother       Breast Cancer Paternal Grandmother       Pancreatic Cancer Paternal Grandmother       Cardiovascular Paternal Aunt       Cardiovascular Paternal Uncle       Depression Other       Alcoholism Father       Thyroid Disease Sister       Alcoholism Sister       Hypertension Maternal Grandmother       Heart Disease Sister           multiple ablations     Alcoholism Sister       Prostate Cancer Paternal Grandfather                Allergies:           Allergies   Allergen Reactions     Adhesive Tape Blisters     Erythromycin Rash           Medications:      No current facility-administered medications on file prior to encounter.   cyclobenzaprine (FLEXERIL) 10 MG tablet, Take 1 tablet (10 mg) by mouth nightly as needed for muscle spasms  DULoxetine (CYMBALTA) 60 MG EC capsule, TK ONE C PO  QAM  folic acid (FOLVITE) 1 MG tablet, Take 4 tablets (4 mg) by mouth daily Take 3-4 tablets daily  insulin syringe-needle U-100 (BD INSULIN SYRINGE ULTRAFINE) 30G X 1/2\" 1 ML, For methotrexate use weekly  leucovorin (WELLCOVORIN) 5 MG tablet, Take 1 tablet (5 mg) by mouth every 7 days *take 24h after taking " weekly methotrexate dose  lisinopril (PRINIVIL/ZESTRIL) 10 MG tablet, TAKE 1 TABLET BY MOUTH DAILY  methotrexate 50 MG/2ML injection CHEMO, Inject 1 mL (25 mg) Subcutaneous every 7 days Inject 0.8 mL (20 mg) weekly  methylPREDNISolone (MEDROL DOSEPAK) 4 MG tablet, Follow package instructions (Patient not taking: Reported on 6/26/2019)  nystatin (MYCOSTATIN) 767380 UNIT/GM external powder, Apply to coat rash tid, neck and breast area rashes  spironolactone (ALDACTONE) 25 MG tablet, Take 1 tablet (25 mg) by mouth daily  varenicline (CHANTIX STARTING MONTH PAK) 0.5 MG X 11 & 1 MG X 42 tablet, Take 0.5 mg by mouth daily AND 0.5 mg 2 times daily AND 1 mg 2 times daily. (Patient not taking: No sig reported)                Physical Exam:      B/P: 131/65, T: 98, P: 81, R: 18         Wt Readings from Last 4 Encounters:   09/30/19 (!) 173.1 kg (381 lb 9.6 oz)   09/09/19 (!) 178.6 kg (393 lb 11.9 oz)   08/12/19 (!) 181.4 kg (400 lb)   06/26/19 (!) 191 kg (421 lb)            Intake/Output Summary (Last 24 hours) at 9/30/2019 1604  Last data filed at 9/30/2019 1557      Gross per 24 hour   Intake 1320 ml   Output 2500 ml   Net -1180 ml         Gen: AA&Ox3, no acute distress  HEENT:AT/ NC, PERRL b/l, EOM grossly intact, right neck with healing skin incisions  PULM/THORAX: Clear to auscultation bilaterally, no rales/rhonchi/wheezes  CV: distant heart sounds. RRR, S1 and S2 appreciated, no extra heart sounds, murmurs or rub auscultated. No clear JVP elevation  ABD: obese, soft, nontender, nondistended  EXT: bilateral non pitting edema to the level of the knee  NEURO: non focal       Data:      Labs Reviewed on Admission     Troponin           Lab Results   Component Value Date     TROPI <0.015 09/23/2019     TROPI <0.015 09/21/2019     TROPI <0.015 09/20/2019     TROPI <0.015 09/20/2019     TROPI <0.015 09/20/2019      [facility]          Recent Labs   Lab 09/30/19  0625 09/29/19  0614 09/28/19  0705 09/27/19  1458 09/27/19  0628   NA  142 142 142  --  142   POTASSIUM 4.0 4.2 3.9 3.4 3.3*   CHLORIDE 109 110* 110*  --  108   MARIA INES 9.2 8.9 8.9  --  8.7   CO2 25 24 23  --  26   BUN 14 13 15  --  16   CR 0.64 0.68 0.77  --  0.69   * 92 96  --  94      CBC  Recent Labs   Lab 09/30/19  0625 09/29/19  0614 09/28/19  0705 09/27/19  1458   09/27/19  0628   WBC 6.8 7.5 8.7  --   --  9.2   RBC 3.33* 3.17* 3.14*  --   --  3.44*   HGB 9.8* 9.4* 9.2*  --   --  10.1*   HCT 32.2* 31.1* 30.6*  --   --  33.3*   MCV 97 98 98  --   --  97   MCH 29.4 29.7 29.3  --   --  29.4   MCHC 30.4* 30.2* 30.1*  --   --  30.3*   RDW 17.3* 17.1* 17.0*  --   --  16.5*   * 81* 75* 81*   < > Platelets clumped, platelet count unavailable    < > = values in this interval not displayed.      INR  Recent Labs   Lab 09/30/19 0625 09/29/19 0614 09/28/19  0705 09/27/19  0628   INR 2.19* 1.95* 1.65* 1.57*      Hepatic Panel           Lab Results   Component Value Date     AST 20 09/30/2019              Lab Results   Component Value Date     ALT 26 09/30/2019              Lab Results   Component Value Date     BILICONJ 0.0 12/30/2008              Lab Results   Component Value Date     BILITOTAL 0.3 09/30/2019              Lab Results   Component Value Date     ALBUMIN 2.7 09/30/2019              Lab Results   Component Value Date     PROTTOTAL 7.1 09/30/2019              Lab Results   Component Value Date     ALKPHOS 64 09/30/2019              Most Recent Imaging:      ECG: NSR without noted conduction abnormalities     Echo 9/22/19:  Technically difficult study.  Left ventricular function, chamber size, wall motion, and wall thickness are  normal.The EF is 55-60%.  Right ventricular function, chamber size, wall motion, and thickness are  normal.  No pericardial effusion is present.

## 2019-09-30 NOTE — PROVIDER NOTIFICATION
Pt had bradycardic episodes to the 40's and had up to 3.6 second pause at  03:26 AM, Notified cards cross-cover MD (Cheryl Lam),

## 2019-09-30 NOTE — PROGRESS NOTES
[facility]   Cardiology Progress      Interval History: Still having some chest pain with coughing.  Denies nausea, vomiting, chest pressure, new/worsening shortness of breath, abdominal pain, dysuria, new LE pain/swelling.  Denies pain to groin; anxious to have sutures removed.    Physical Exam:  Temp:  [97.2  F (36.2  C)-98  F (36.7  C)] 97.2  F (36.2  C)  Heart Rate:  [60-92] 92  Resp:  [16-18] 18  BP: (104-134)/(51-88) 128/71  SpO2:  [90 %-94 %] 91 %    Intake/Output Summary (Last 24 hours) at 9/30/2019 1132  Gross per 24 hour   Intake 1680 ml   Output 2300 ml   Net -620 ml     GEN: NAD, resting in bed  Pulm: mild increased work of breathing; faint bibasilar rales.  No appreciable rhonchi  Cardiac: Normal S1 and S2.  Regular rate/rhythm. JVP not appreciably elevated, but difficult exam secondary to body habitus.  No murmurs.  No pitting LE edema.  Vascular: extremities are warm, well perfused  Left Groin: sutures in place.  Incision is clean, dry, intact.  No appreciable erythema  GI: soft, non distended  Neuro: Alert and oriented x4.    Medications:    atorvastatin  40 mg Oral QPM     DULoxetine  60 mg Oral Daily     metoprolol tartrate  25 mg Oral BID     miconazole   Topical BID     multivitamin w/minerals  1 tablet Oral or Feeding Tube Daily     pantoprazole  40 mg Oral BID AC     sodium chloride (PF)  3 mL Intracatheter Q8H     vitamin B1  100 mg Oral or Feeding Tube Daily       IV fluid REPLACEMENT ONLY 65 mL/hr at 09/22/19 2155     IV fluid REPLACEMENT ONLY       - MEDICATION INSTRUCTIONS -       - MEDICATION INSTRUCTIONS -       - MEDICATION INSTRUCTIONS -       Warfarin Therapy Reminder         Labs:   CMP  Recent Labs   Lab 09/30/19  0625 09/29/19  0614 09/28/19  0705 09/27/19  1458 09/27/19  0628    142 142  --  142   POTASSIUM 4.0 4.2 3.9 3.4 3.3*   CHLORIDE 109 110* 110*  --  108   CO2 25 24 23  --  26   ANIONGAP 8 8 9  --  8   * 92 96  --  94   BUN 14  13 15  --  16   CR 0.64 0.68 0.77  --  0.69   GFRESTIMATED >90 >90 85  --  >90   GFRESTBLACK >90 >90 >90  --  >90   MARIA INES 9.2 8.9 8.9  --  8.7   MAG 2.2 2.3 2.2  --  2.3   PHOS 4.0 3.3 3.1  --  3.5   PROTTOTAL 7.1 6.9 6.8  --  7.0   ALBUMIN 2.7* 2.7* 2.5*  --  2.6*   BILITOTAL 0.3 0.4 0.3  --  0.3   ALKPHOS 64 62 66  --  64   AST 20 16 9  --  19   ALT 26 26 20  --  25     CBC  Recent Labs   Lab 09/30/19  0625 09/29/19  0614 09/28/19  0705 09/27/19  1458  09/27/19  0628   WBC 6.8 7.5 8.7  --   --  9.2   RBC 3.33* 3.17* 3.14*  --   --  3.44*   HGB 9.8* 9.4* 9.2*  --   --  10.1*   HCT 32.2* 31.1* 30.6*  --   --  33.3*   MCV 97 98 98  --   --  97   MCH 29.4 29.7 29.3  --   --  29.4   MCHC 30.4* 30.2* 30.1*  --   --  30.3*   RDW 17.3* 17.1* 17.0*  --   --  16.5*   * 81* 75* 81*   < > Platelets clumped, platelet count unavailable    < > = values in this interval not displayed.     INR  Recent Labs   Lab 09/30/19  0625 09/29/19  0614 09/28/19  0705 09/27/19  0628   INR 2.19* 1.95* 1.65* 1.57*     Arterial Blood Gas  Recent Labs   Lab 09/23/19  1231   PH 7.47*   PCO2 44   PO2 57*   HCO3 32*   O2PER 3L     ASSESSMENT/PLAN:  Shira Rodriguez is a 59 F with a history of hypertension, hyperlipidemia, sleep apnea, morbid obesity, and chronic bronchitis who presented to [facility] with massive PE with following PEA arrest 9/9 and ROSC after intubation/resuscitation.     # PEA arrest secondary to massive pulmonary embolism  # Hypoxic respiratory failure - improving  # Volume overload - improving  Transferred to [facility] for VA ECMO, and IR thrombectomy and catheter directed lytics on 9/9, went for VA ECMO decannulation on 9/17 complicated by hypercarbic respiratory failure requiring placement of VV ECMO through RIJ-FV bypass. Now VV ECMO s/p decannulation on 9/19. Off sedation 9/22, extubated 9/22. Repeat CT with no residual clot in PA. RV function improved to normal. She is being bridged to a therapeutic INR and has minimal oxygen  requirements at this stage s/p diuresis - requiring with activity and at night. CXR 9/27 with continued pulmonary edema. Will continue with intermittent lasix. INR 2.19 today; heparin discontinued 09/29  - Left groin incision with retention sutures. Should be removed 10/01/2019.   - Warfarin, goal INR 2-3. Pharmacy to dose  - Duonebs PRN  - Acapella qid   - Wean oxygen as able  - Lasix 40mg IV this AM    # Volume Overload  Estimated dry weight unclear.  Has been as low as 379 lbs this admission.  Had been receiving 60mg IV lasix daily; transitioned to 20mg IV daily 09/26 and 09/28.  Received 40mg IV lasix 09/29.  Still intermittently requiring some oxygen.  Difficulty volume status due to morbid obesity  - give 40mg IV lasix today; continue to monitor I and O's.    # Sinus Pause  Noted overnight 09/29 while sleeping; length of 3.6 seconds.  Discussed with EP; no need for acute intervention to follow  - EP consult; appreciate formal recommendations    # Acute Thrombocytopenia  Thrombocytopenia 60-80k baseline 180k. Today, 103k. Had precipitous drop in platelets after admission - suspected due to consumption. 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   - CBC daily     # Afib/Aflutter  Noted to be in atrial fibrillation while in the ICU and on dobutamine, which was thought to be exacerbating factor. Dobutamine discontinued 9/23 and patient was given amiodarone bolus at that time. She converted to sinus. On 9/25 she went into atrial flutter with variable block. Again was given an amio bolus, fluid bolus, and IV metop x1. Continues to intermittently go into a-fib - rates now controlled. Has been in sinus rhythm x24 hours  - metoprolol to 25mg bid  - warfarin as above     #COPD  # History of tobacco abuse  # Aspiration pneumonia - resolved  Sputum cultures:               - 9/11 MSSA: vancomycin/zosyn x5 days for ECMO, vanc last dose 9/15, cefazolin on 9/17-9/22               - 9/20 Serratia (ceftriaxone  susceptible): Ceftriaxone 9/23-9/28   - s/p prednisone burst     # History of depression  Cymbalta held while in ICU. Will resume at lower dose over next few days, and increase to PTA dose. Additionally reports that she may have been on wellbutrin, however, notes regarding this drug state she had increased crying episodes while on this medication and it appears to have been stopped.  - up titrated to home 60mg cymbalta today     # HTN   - metoprolol as above  - Will resume PTA lisinopril pending BP     # HLD  - Atorvastatin 40mg     # CYRUS   - Oxygen at night PRN to keep O2 sats >90%.   - has CPAP at home. Refusing CPAP while inpatient.       Anticipated Disposition: likely to ARU Wednesday    Patient seen and discussed with Dr. Raffaele Stuart, who agrees with above plan.    Annel Dobson PA-C  Cardiology - [facility]        I have seen and examined the patient with the [facility] team. I agree with the assessment and plan of the note above.I have reviewed pertinent labs.     Raffaele Stuart MD  Interventional Cardiology  Pager: [phone]

## 2019-09-30 NOTE — PLAN OF CARE
OT/6C:   Discharge Planner OT   Patient plan for discharge: Rehab.   Current status: Patient completes bed mobility with Min A. CGAx2 to complete SPT to bedside commode and complete sit <> stands throughout session. Max A to complete pericares. Engaging patient in standing marching task to promote increase endurance. Tolerates x30 second bouts x3 before fatiguing, with cues for breathing techniques. HR up to 100's. O2 increased to 3L O2 with activity to maintain >93%. Max A to return supine; using repo sheet and overhead lift to reposition. Complains of general rib/sternum pain (reports from compressions) throughout session.   Barriers to return to prior living situation: Medical Status, Activity Tolerance, Pain, Level of A for ADL/Mobility  Recommendations for discharge: ARU vs TCU.   Rationale for recommendations: Patient making progress with therapies while inpatient. Is motivated to participate. Is limited by activity tolerance at times, but anticipate could tolerate 3 hours of therapies per day with incorporation of rest breaks/EC strategies.        Entered by: Mari Howard 09/30/2019 4:52 PM

## 2019-09-30 NOTE — PROGRESS NOTES
Cardiology Consult                                                               2019  Shira Rodriguez MRN: [id number]  Age: 59 year old, : 1960        Reason for consult:      pause        Assessment and Recommendation:     In summary, this is a 59-year-old female with a history of morbid obesity and metabolic syndrome who presented with a massive pulmonary embolism complicated by PEA cardiac arrest status post both VA ECMO and VV ECMO with whom electrophysiology is consulted for sinus pause.    1.  Nocturnal bradycardia: The patient was noted to have a 3.6-second pause in sinus rhythm.  She is currently on beta-blockade, was sleeping, and was asymptomatic at the time.  These reasons, this requires no further evaluation.  However, given that the patient has had a 8-second pause in the outpatient setting, it would be reasonable to stop her AV temi blockers (metoprolol).    2.  Paroxysmal atrial fibrillation: CHADSVasc 3.  The patient is currently in sinus rhythm.  As above, recommend stopping AV temi blockers.    Plan:  -Stop metoprolol  -Outpatient CPAP/CYRUS optimization  -Please discharge patient with Zio to ensure the patient is not having frequent episodes of RVR as she was noted to be asymptomatic with these while hospitalized  -Continue anticoagulation with a goal INR of 2-3  -Follow-up with Dr. Valadez     Patient discussed with staff attending, Dr. Magana and the note reflects our joint plan. Thank you for consulting the cardiovascular services at the [facility]. Please do not hesitate to call with questions or concerns.     Misael Kaiser MD    PGY-5, Cardiology Fellow  Pager: [phone]        History of Present Illness:     The patient is a 59-year-old female with a past medical history notable for morbid obesity, obstructive sleep apnea, metabolic syndrome, and hyperlipidemia who presented with a massive pulmonary embolism.  Her hospital course was  complicated by PEA cardiac arrest.  She underwent VA ECMO placement and ultimately had catheter directed thrombolytics on 9/9.  Her hospital course was further complicated by respiratory failure following VA ECMO decannulation for which she underwent VV ECMO placement.  She later was decannulated on 9/19.  Follow-up CT scans showed no evidence of residual thrombus and her echocardiogram has shown return to normal RV function.  Her hospital course has also been complicated by atrial fibrillation.  She was pharmacologically converted with IV amiodarone initially.  The last event of this was on 9/25.  Following this, she was started on metoprolol.  Overnight, the patient developed a 3.6-second pause.  She was in sinus rhythm at the time.  She has had additional 2 to 3-second pauses.  She was sleeping and was asymptomatic.  Overall, she feels well and has no complaints.    More distantly, the patient had an 8-second vagally mediated pause in April during a sleep study.  She later went on to see electrophysiology who recommended AV temi blockers, however no other intervention was recommended.    A comprehensive 10 point review of systems was completed and relevant findings are noted in the HPI.      Past Medical History:     Patient Active Problem List   Diagnosis     Primary localized osteoarthrosis, pelvic region and thigh     Adjustment disorder with depressed mood     Tobacco use disorder     Morbid obesity (H)     Arthritis of knee, degenerative     Degeneration of cervical intervertebral disc     CYRUS (obstructive sleep apnea)     Elevated blood pressure (not hypertension)     Pulmonary emboli (H)     Rheumatoid arthritis (H)         Past Surgical History:      Past Surgical History:   Procedure Laterality Date     ARTHROPLASTY KNEE  6/14/2012    Procedure: ARTHROPLASTY KNEE;  Left Total Knee Arthroplasty;  Surgeon: Annette Quesada MD;  Location: [facility]     ARTHROSCOPY HIP Right      BRONCHOSCOPY FLEXIBLE AND  RIGID N/A 9/17/2019    Procedure: Bronchoscopy flexible;  Surgeon: Patrick Rayo MD;  Location: [facility] OR     C TOTAL HIP ARTHROPLASTY  07/09/04    RT     CV EXTRACORPERAL MEMBRANE OXYGENATION N/A 9/9/2019    Procedure: Extracorporeal Membrance Oxygenation;  Surgeon: Kaleb Mcfarlane MD;  [facility]     INSERT EXTRACORPORAL MEMBRANE OXYGENATOR N/A 9/17/2019    Procedure: Venous-Venous cannulation using ultrasound guidance and transesophageal echocardiogram, venous-arterial ecmo decannulation and repair of left femoral artery;  Surgeon: Patrick Rayo MD;  Location: [facility] OR     IR PULMONARY ANGIOGRAM BILATERAL  9/10/2019         Social History:     Social History     Socioeconomic History     Marital status: Single     Spouse name: Not on file     Number of children: Not on file     Years of education: Not on file     Highest education level: Not on file   Occupational History     Occupation: registered nurse     Comment: [employer]   Social Needs     Financial resource strain: Not on file     Food insecurity:     Worry: Not on file     Inability: Not on file     Transportation needs:     Medical: Not on file     Non-medical: Not on file   Tobacco Use     Smoking status: Current Every Day Smoker     Packs/day: 1.00     Years: 33.00     Pack years: 33.00     Types: Cigarettes     Start date: 1/1/1982     Smokeless tobacco: Never Used     Tobacco comment: maybe   Substance and Sexual Activity     Alcohol use: Yes     Alcohol/week: 0.0 standard drinks     Comment: occassional     Drug use: No     Sexual activity: Never   Lifestyle     Physical activity:     Days per week: Not on file     Minutes per session: Not on file     Stress: Not on file   Relationships     Social connections:     Talks on phone: Not on file     Gets together: Not on file     Attends Jainism service: Not on file     Active member of club or organization: Not on file     Attends meetings of clubs or  "organizations: Not on file     Relationship status: Not on file     Intimate partner violence:     Fear of current or ex partner: Not on file     Emotionally abused: Not on file     Physically abused: Not on file     Forced sexual activity: Not on file   Other Topics Concern     Parent/sibling w/ CABG, MI or angioplasty before 65F 55M? Not Asked   Social History Narrative     Not on file         Family History:     Family History   Problem Relation Age of Onset     Thyroid Disease Mother      Hypertension Mother      Skin Cancer Mother         MMohs surgery with skin graft     Cerebrovascular Disease Mother         CVA after endarderectomy     Diabetes Mother      Breast Cancer Paternal Grandmother      Pancreatic Cancer Paternal Grandmother      Cardiovascular Paternal Aunt      Cardiovascular Paternal Uncle      Depression Other      Alcoholism Father      Thyroid Disease Sister      Alcoholism Sister      Hypertension Maternal Grandmother      Heart Disease Sister         multiple ablations     Alcoholism Sister      Prostate Cancer Paternal Grandfather          Allergies:     Allergies   Allergen Reactions     Adhesive Tape Blisters     Erythromycin Rash         Medications:     No current facility-administered medications on file prior to encounter.   cyclobenzaprine (FLEXERIL) 10 MG tablet, Take 1 tablet (10 mg) by mouth nightly as needed for muscle spasms  DULoxetine (CYMBALTA) 60 MG EC capsule, TK ONE C PO  QAM  folic acid (FOLVITE) 1 MG tablet, Take 4 tablets (4 mg) by mouth daily Take 3-4 tablets daily  insulin syringe-needle U-100 (BD INSULIN SYRINGE ULTRAFINE) 30G X 1/2\" 1 ML, For methotrexate use weekly  leucovorin (WELLCOVORIN) 5 MG tablet, Take 1 tablet (5 mg) by mouth every 7 days *take 24h after taking weekly methotrexate dose  lisinopril (PRINIVIL/ZESTRIL) 10 MG tablet, TAKE 1 TABLET BY MOUTH DAILY  methotrexate 50 MG/2ML injection CHEMO, Inject 1 mL (25 mg) Subcutaneous every 7 days Inject 0.8 mL " (20 mg) weekly  methylPREDNISolone (MEDROL DOSEPAK) 4 MG tablet, Follow package instructions (Patient not taking: Reported on 6/26/2019)  nystatin (MYCOSTATIN) 906746 UNIT/GM external powder, Apply to coat rash tid, neck and breast area rashes  spironolactone (ALDACTONE) 25 MG tablet, Take 1 tablet (25 mg) by mouth daily  varenicline (CHANTIX STARTING MONTH TANYA) 0.5 MG X 11 & 1 MG X 42 tablet, Take 0.5 mg by mouth daily AND 0.5 mg 2 times daily AND 1 mg 2 times daily. (Patient not taking: No sig reported)            Physical Exam:     B/P: 131/65, T: 98, P: 81, R: 18    Wt Readings from Last 4 Encounters:   09/30/19 (!) 173.1 kg (381 lb 9.6 oz)   09/09/19 (!) 178.6 kg (393 lb 11.9 oz)   08/12/19 (!) 181.4 kg (400 lb)   06/26/19 (!) 191 kg (421 lb)         Intake/Output Summary (Last 24 hours) at 9/30/2019 1604  Last data filed at 9/30/2019 1557  Gross per 24 hour   Intake 1320 ml   Output 2500 ml   Net -1180 ml       Gen: AA&Ox3, no acute distress  HEENT:AT/ NC, PERRL b/l, EOM grossly intact, right neck with healing skin incisions  PULM/THORAX: Clear to auscultation bilaterally, no rales/rhonchi/wheezes  CV: distant heart sounds. RRR, S1 and S2 appreciated, no extra heart sounds, murmurs or rub auscultated. No clear JVP elevation  ABD: obese, soft, nontender, nondistended  EXT: bilateral non pitting edema to the level of the knee  NEURO: non focal      Data:     Labs Reviewed on Admission    Troponin   Lab Results   Component Value Date    TROPI <0.015 09/23/2019    TROPI <0.015 09/21/2019    TROPI <0.015 09/20/2019    TROPI <0.015 09/20/2019    TROPI <0.015 09/20/2019     BMP  Recent Labs   Lab 09/30/19  0625 09/29/19  0614 09/28/19  0705 09/27/19  1458 09/27/19  0628    142 142  --  142   POTASSIUM 4.0 4.2 3.9 3.4 3.3*   CHLORIDE 109 110* 110*  --  108   MARIA INES 9.2 8.9 8.9  --  8.7   CO2 25 24 23  --  26   BUN 14 13 15  --  16   CR 0.64 0.68 0.77  --  0.69   * 92 96  --  94     CBC  Recent Labs   Lab  09/30/19  0625 09/29/19  0614 09/28/19  0705 09/27/19  1458  09/27/19  0628   WBC 6.8 7.5 8.7  --   --  9.2   RBC 3.33* 3.17* 3.14*  --   --  3.44*   HGB 9.8* 9.4* 9.2*  --   --  10.1*   HCT 32.2* 31.1* 30.6*  --   --  33.3*   MCV 97 98 98  --   --  97   MCH 29.4 29.7 29.3  --   --  29.4   MCHC 30.4* 30.2* 30.1*  --   --  30.3*   RDW 17.3* 17.1* 17.0*  --   --  16.5*   * 81* 75* 81*   < > Platelets clumped, platelet count unavailable    < > = values in this interval not displayed.     INR  Recent Labs   Lab 09/30/19  0625 09/29/19  0614 09/28/19  0705 09/27/19  0628   INR 2.19* 1.95* 1.65* 1.57*      Hepatic Panel   Lab Results   Component Value Date    AST 20 09/30/2019     Lab Results   Component Value Date    ALT 26 09/30/2019     Lab Results   Component Value Date    BILICONJ 0.0 12/30/2008      Lab Results   Component Value Date    BILITOTAL 0.3 09/30/2019     Lab Results   Component Value Date    ALBUMIN 2.7 09/30/2019     Lab Results   Component Value Date    PROTTOTAL 7.1 09/30/2019      Lab Results   Component Value Date    ALKPHOS 64 09/30/2019           Most Recent Imaging:     ECG: NSR without noted conduction abnormalities    Echo 9/22/19:  Technically difficult study.  Left ventricular function, chamber size, wall motion, and wall thickness are  normal.The EF is 55-60%.  Right ventricular function, chamber size, wall motion, and thickness are  normal.  No pericardial effusion is present.

## 2019-09-30 NOTE — PLAN OF CARE
D: Hx of hypertension, hyperlipidemia, sleep apnea, morbid obesity, and chronic bronchitis who presented to [facility] with massive PE with following PEA arrest 9/9    I: Monitored vitals and assessed pt status.   Changed: Pannus dressing  Running: PIV SL  PRN:     A: A0x4. VSS. Afebrile. SR. Sating high 80s/low 90s on RA- placed on 1.5L this evening. SANTOS. Occasional chest discomfort from chest compressions- declines intervention. Up with assist x1 to pivot to chair/commode. Urinating adequately- purewick in place overnight. Pannus dressing changed- due BID. L groin incision with sutures- to be removed tomorrow 9/30- covered with dry gauze d/t moisture, edges not approximated. R neck incision MILES. Good appetite- eating well. 2g Na diet. BM today. Pt pleasant, cooperative.      P: Discharge to TCU in 1-2 days. Continue to monitor Pt status and report changes to treatment team.

## 2019-09-30 NOTE — PLAN OF CARE
"Discharge Planner PT   Patient plan for discharge: rehab  Current status: Pt motivated and participates well in therapy today. Educated on ARU requirements with pt agreeable to \"whatever will get me home and better\". Pt SBA form recliner and Gilles from wc sit<>stand to FWW. O2 sats >90% on RA at rest but requires 2-4L with activity to maintain >90% (desats to 85% on 2L with amb). Pt ambulates x15' + x20' + x30' with FWW and CGA, limited by SOB/fatigue.  Barriers to return to prior living situation: medical needs, weakness, significant deconditioning, impaired balance  Recommendations for discharge: ARU  Rationale for recommendations: pt well below baseline IND mobility. She is motivated and though limited by SOB/fatigue participates well with intermittent recovery breaks, this writer believe she would tolerate 3 hours therapy daily. Pt demos need for intense multidisciplinary rehab to progress toward PLOF and return to IND mobility for safe return home alone.        Entered by: Kayla Nava 09/30/2019 10:54 AM       "

## 2019-09-30 NOTE — PLAN OF CARE
NEURO: alert and oriented x4.   RESPIRATORY: LS diminished in bases. SpO2>94% on 1.5 LPM. Pt 89-90% on room air. Encouraged pt to use acapella and IS.   CARDIO:  Sinus rhythm. Awaiting EP consult. VSS. Lasix given x1.   GI/: BS active, + gas, + BM. Voiding adequately. Pt used purewick after lasix administration for convenience. Good oral intake. Reported some nausea before BM, after BM pt reported relief.   SKIN: Under pannus and breast - reddened and moist- abd and miconazole powder applied. Pt refused powder/barrier cream in reddened ally area until after sutures were removed by provider. Bruising on chest from CPR visible. Scabs and skin tears healing without complications.   ACTIVITY: Up with assist 1 and walker. Pt refused gait belt. Up to commode x3. Up in chair x1.   PAIN: Reported chest tenderness with movement and touch.Denied pain at rest.   LINES: PIV sl. Site WNL.   PLAN: Continue therapy and diuresis. Pt's K+ replaced. Notify MD with concerns.

## 2019-10-01 ENCOUNTER — APPOINTMENT (OUTPATIENT)
Dept: OCCUPATIONAL THERAPY | Facility: CLINIC | Age: 59
DRG: 003 | End: 2019-10-01
Payer: COMMERCIAL

## 2019-10-01 ENCOUNTER — APPOINTMENT (OUTPATIENT)
Dept: PHYSICAL THERAPY | Facility: CLINIC | Age: 59
DRG: 003 | End: 2019-10-01
Payer: COMMERCIAL

## 2019-10-01 ENCOUNTER — APPOINTMENT (OUTPATIENT)
Dept: GENERAL RADIOLOGY | Facility: CLINIC | Age: 59
DRG: 003 | End: 2019-10-01
Attending: PHYSICIAN ASSISTANT
Payer: COMMERCIAL

## 2019-10-01 LAB
ALBUMIN SERPL-MCNC: 2.8 G/DL (ref 3.4–5)
ALP SERPL-CCNC: 66 U/L (ref 40–150)
ALT SERPL W P-5'-P-CCNC: 23 U/L (ref 0–50)
ANION GAP SERPL CALCULATED.3IONS-SCNC: 8 MMOL/L (ref 3–14)
AST SERPL W P-5'-P-CCNC: 11 U/L (ref 0–45)
BILIRUB SERPL-MCNC: 0.3 MG/DL (ref 0.2–1.3)
BUN SERPL-MCNC: 17 MG/DL (ref 7–30)
CALCIUM SERPL-MCNC: 9.4 MG/DL (ref 8.5–10.1)
CHLORIDE SERPL-SCNC: 109 MMOL/L (ref 94–109)
CO2 SERPL-SCNC: 25 MMOL/L (ref 20–32)
CREAT SERPL-MCNC: 0.72 MG/DL (ref 0.52–1.04)
ERYTHROCYTE [DISTWIDTH] IN BLOOD BY AUTOMATED COUNT: 17.5 % (ref 10–15)
GFR SERPL CREATININE-BSD FRML MDRD: >90 ML/MIN/{1.73_M2}
GLUCOSE SERPL-MCNC: 94 MG/DL (ref 70–99)
HCT VFR BLD AUTO: 32.1 % (ref 35–47)
HGB BLD-MCNC: 9.8 G/DL (ref 11.7–15.7)
INR PPP: 2.47 (ref 0.86–1.14)
MAGNESIUM SERPL-MCNC: 1.9 MG/DL (ref 1.6–2.3)
MCH RBC QN AUTO: 29.5 PG (ref 26.5–33)
MCHC RBC AUTO-ENTMCNC: 30.5 G/DL (ref 31.5–36.5)
MCV RBC AUTO: 97 FL (ref 78–100)
PHOSPHATE SERPL-MCNC: 3.9 MG/DL (ref 2.5–4.5)
PLATELET # BLD AUTO: 71 10E9/L (ref 150–450)
POTASSIUM SERPL-SCNC: 3.7 MMOL/L (ref 3.4–5.3)
PROT SERPL-MCNC: 6.8 G/DL (ref 6.8–8.8)
RBC # BLD AUTO: 3.32 10E12/L (ref 3.8–5.2)
SODIUM SERPL-SCNC: 142 MMOL/L (ref 133–144)
WBC # BLD AUTO: 6.7 10E9/L (ref 4–11)

## 2019-10-01 PROCEDURE — 25000132 ZZH RX MED GY IP 250 OP 250 PS 637: Performed by: PHYSICIAN ASSISTANT

## 2019-10-01 PROCEDURE — 71045 X-RAY EXAM CHEST 1 VIEW: CPT

## 2019-10-01 PROCEDURE — 84100 ASSAY OF PHOSPHORUS: CPT | Performed by: INTERNAL MEDICINE

## 2019-10-01 PROCEDURE — 80053 COMPREHEN METABOLIC PANEL: CPT | Performed by: INTERNAL MEDICINE

## 2019-10-01 PROCEDURE — 25000125 ZZHC RX 250: Performed by: SURGERY

## 2019-10-01 PROCEDURE — 99233 SBSQ HOSP IP/OBS HIGH 50: CPT | Mod: GC | Performed by: INTERNAL MEDICINE

## 2019-10-01 PROCEDURE — 25000132 ZZH RX MED GY IP 250 OP 250 PS 637: Performed by: INTERNAL MEDICINE

## 2019-10-01 PROCEDURE — 25000132 ZZH RX MED GY IP 250 OP 250 PS 637: Performed by: STUDENT IN AN ORGANIZED HEALTH CARE EDUCATION/TRAINING PROGRAM

## 2019-10-01 PROCEDURE — 97530 THERAPEUTIC ACTIVITIES: CPT | Mod: GP

## 2019-10-01 PROCEDURE — 85027 COMPLETE CBC AUTOMATED: CPT | Performed by: INTERNAL MEDICINE

## 2019-10-01 PROCEDURE — 97110 THERAPEUTIC EXERCISES: CPT | Mod: GO

## 2019-10-01 PROCEDURE — 25000132 ZZH RX MED GY IP 250 OP 250 PS 637: Performed by: SURGERY

## 2019-10-01 PROCEDURE — 85610 PROTHROMBIN TIME: CPT | Performed by: INTERNAL MEDICINE

## 2019-10-01 PROCEDURE — 97116 GAIT TRAINING THERAPY: CPT | Mod: GP

## 2019-10-01 PROCEDURE — 83735 ASSAY OF MAGNESIUM: CPT | Performed by: INTERNAL MEDICINE

## 2019-10-01 PROCEDURE — 97535 SELF CARE MNGMENT TRAINING: CPT | Mod: GO

## 2019-10-01 PROCEDURE — 21400000 ZZH R&B CCU UMMC

## 2019-10-01 PROCEDURE — 36415 COLL VENOUS BLD VENIPUNCTURE: CPT | Performed by: INTERNAL MEDICINE

## 2019-10-01 PROCEDURE — G0463 HOSPITAL OUTPT CLINIC VISIT: HCPCS

## 2019-10-01 PROCEDURE — 25000128 H RX IP 250 OP 636: Performed by: PHYSICIAN ASSISTANT

## 2019-10-01 RX ORDER — LISINOPRIL 2.5 MG/1
5 TABLET ORAL DAILY
Status: DISCONTINUED | OUTPATIENT
Start: 2019-10-01 | End: 2019-10-07 | Stop reason: HOSPADM

## 2019-10-01 RX ORDER — WARFARIN SODIUM 7.5 MG/1
7.5 TABLET ORAL
Status: COMPLETED | OUTPATIENT
Start: 2019-10-01 | End: 2019-10-01

## 2019-10-01 RX ORDER — FUROSEMIDE 10 MG/ML
40 INJECTION INTRAMUSCULAR; INTRAVENOUS 2 TIMES DAILY
Status: DISCONTINUED | OUTPATIENT
Start: 2019-10-01 | End: 2019-10-04

## 2019-10-01 RX ADMIN — POTASSIUM CHLORIDE 20 MEQ: 750 TABLET, EXTENDED RELEASE ORAL at 12:24

## 2019-10-01 RX ADMIN — MICONAZOLE NITRATE: 20 POWDER TOPICAL at 19:42

## 2019-10-01 RX ADMIN — FUROSEMIDE 40 MG: 10 INJECTION, SOLUTION INTRAVENOUS at 16:36

## 2019-10-01 RX ADMIN — FUROSEMIDE 40 MG: 10 INJECTION, SOLUTION INTRAVENOUS at 12:15

## 2019-10-01 RX ADMIN — PANTOPRAZOLE SODIUM 40 MG: 40 TABLET, DELAYED RELEASE ORAL at 09:28

## 2019-10-01 RX ADMIN — MULTIPLE VITAMINS W/ MINERALS TAB 1 TABLET: TAB at 09:28

## 2019-10-01 RX ADMIN — PANTOPRAZOLE SODIUM 40 MG: 40 TABLET, DELAYED RELEASE ORAL at 16:36

## 2019-10-01 RX ADMIN — MICONAZOLE NITRATE: 20 POWDER TOPICAL at 09:28

## 2019-10-01 RX ADMIN — THIAMINE HCL TAB 100 MG 100 MG: 100 TAB at 09:28

## 2019-10-01 RX ADMIN — Medication 2 G: at 09:27

## 2019-10-01 RX ADMIN — WARFARIN SODIUM 7.5 MG: 7.5 TABLET ORAL at 18:05

## 2019-10-01 RX ADMIN — DULOXETINE HYDROCHLORIDE 60 MG: 30 CAPSULE, DELAYED RELEASE ORAL at 09:27

## 2019-10-01 RX ADMIN — LISINOPRIL 5 MG: 2.5 TABLET ORAL at 12:15

## 2019-10-01 RX ADMIN — ATORVASTATIN CALCIUM 40 MG: 40 TABLET, FILM COATED ORAL at 19:42

## 2019-10-01 ASSESSMENT — ACTIVITIES OF DAILY LIVING (ADL)
ADLS_ACUITY_SCORE: 14
ADLS_ACUITY_SCORE: 12

## 2019-10-01 ASSESSMENT — MIFFLIN-ST. JEOR: SCORE: 2310.19

## 2019-10-01 NOTE — PLAN OF CARE
PT 6C / Discharge Planner PT   Patient plan for discharge: ARU  Current status: Patient completes sit<>stand transfers with SBA, ambulated 15ft + 30ft + 15ft with FWW + SBA, fatigues quickly with SANTOS requiring seated rest breaks, SpO2>90% on 2L NC during ambulation (on 1.5L at rest).  Barriers to return to prior living situation: medical status, deconditioning, current level of function  Recommendations for discharge: ARU  Rationale for recommendations: Patient is below baseline for mobility and ADLs, would benefit from continued skilled therapy to progress strength, balance, activity tolerance and functional independence. Anticipate patient would be able to tolerate 3hrs therapy/day.       Entered by: Kevin Khan 10/01/2019 5:34 PM

## 2019-10-01 NOTE — PROGRESS NOTES
"CLINICAL NUTRITION SERVICES - REASSESSMENT NOTE     Nutrition Prescription    RECOMMENDATIONS FOR MDs/PROVIDERS TO ORDER:  None at this time.     Recommendations already ordered by Registered Dietitian (RD):  Modified oral supplements to prn  Discontinued free water flushes    Future/Additional Recommendations:  1. Continue low-sodium diet, as ordered.  2. Monitor need for a fluid restriction.   3. Continue multivitamin with minerals, as ordered, to help ensure micronutrient needs are met.   4. Consider discontinuing thiamine, if appropriate.      EVALUATION OF THE PROGRESS TOWARD GOALS   Diet: 2 g Sodium diet. Berry Boost Breeze at 14:00.     Intake: Good diet tolerance. Flowsheets indicate pt consuming 75% of meals with a fair appetite 9/24, 100% of meals with a good appetite 9/25, 100% of meals with a good appetite 9/26, % of meals with a good appetite 9/27, and 100% of meals with a good appetite 9/28-9/30. Per pt, her decreased appetite is not back to normal, but improving. States she is tired from all the therapy but has making an effort to eat because \"she knows she needs to.\" She is tiring of the berry Boost Breeze. Pt has been ordering two to three meals per day. Health[employer] indicates food/beverages sent 9/28-9/30 totaled 1380 kcals and 65 g protein daily on average which does not meet estimated needs below.       Kcal counts:   9/25    487 kcals and 24 g protein (100% scrambled eggs, sausage lauren, orange juice and no supplement/s recorded) - Breakfast was recorded. Supper was not recorded. If pt consumed 100% of supper this would have provided 580 kcals and 22 g protein.    9/26    405 kcals and 8 g protein (100% pancake w/ syrup, 2 strips of castillo  and no supplement/s recorded) - Breakfast was recorded. Supper was not recorded. If pt consumed 100% of supper this would have provided 365 kcals and 25 g protein.    9/27   1054 kcals and 68 g protein (Two meal/s and no supplement/s recorded)   * Pt " "consumed a three-day average of 649 kcals and 33 g protein daily. Not meeting estimated needs noted below.      NEW FINDINGS   Wt Hx: 178.6 kg (10/16/17), 186 kg (10/3/18), 195 kg (4/8/19), 183.1 kg (9/10/19, admit), 171.4 kg (10/1/19) - Pt has lost 12% of her body wt in the past month. She has lost 6.4% of total body wt loss over less than the past month. Suspect actual and fluid loss. Per team today (10/1/19), pt may need further diuresis.     ASSESSED NUTRITION NEEDS (updated)  Dosing Weight: 87 kg (adjusted, based on lowest wt this admission of 171.4 kg on 10/1/19)  Estimated Energy Needs: 9596-5072 kcals/day (20-25 kcals/kg)  Justification: Decreased needs with wt status   Estimated Protein Needs: 104-131 g protein/day (1.2-1.5 g protein/kg)  Justification: Increased needs with stress factors this admission  Estimated Fluid Needs: Per provider, pending fluid status    WO note on 9/25: \"Pannus wound due to Intertrigo. Status: initial assessment.\" No note of pressure injury.     MALNUTRITION  % Intake: Decreased intake does not meet criteria  % Weight Loss: Difficult to assess wt changes with changes in fluid status and diuresis  Subcutaneous Fat Loss: None observed, but difficult to assess with body habitus  Muscle Loss: None observed, but difficult to assess with body habitus  Fluid Accumulation/Edema: None noted  Malnutrition Diagnosis: Patient does not meet two of the above criteria necessary for diagnosing malnutrition    Previous Goals   Total avg nutritional intake to meet a minimum of 20 kcal/kg and 1.5 g PRO/kg daily (per dosing wt 88 kg).  Evaluation: Not meeting.     Previous Nutrition Diagnosis  Inadequate oral intake related to slow advancement of diet order as evidenced by full liquid diet at this time with TF off.    Evaluation: Unresolved. Updated below.     CURRENT NUTRITION DIAGNOSIS  Inadequate oral intake related to decreased appetite with lethargy as evidenced by [employer]Touch indicates " food/beverages sent 9/28-9/30 totaled 1380 kcals and 65 g protein daily on average which does not meet estimated needs of 1697-1291 kcals/day (20-25 kcals/kg) and 104-131 g protein/day (1.2-1.5 g protein/kg).    INTERVENTIONS  Implementation  Nutrition education for nutrition relationship to health/disease: Encourage oral intake adequacy.   Feeding tube flush: Discontinued free water flushes.   Medical food supplement therapy: Changed oral supplement order to prn, as per pt preference. Pt may ask for snacks/supplements prn.     Goals  Patient to consume % of nutritionally adequate meal trays TID, or the equivalent with supplements/snacks.    Monitoring/Evaluation  Progress toward goals will be monitored and evaluated per protocol.      Nutrition will continue to follow.      Sandra Cortés, MS, RD, LD, [facility]   6C Pgr: [phone]

## 2019-10-01 NOTE — PROGRESS NOTES
[facility]   Cardiology Progress      Interval History: Patient continues to have shortness of breath.  Has some ongoing chest pain with cough.  Denies nausea, vomiting, productive cough, abdominal pain, dysuria, LE pain/swelling.    Physical Exam:  Temp:  [97.8  F (36.6  C)-98.4  F (36.9  C)] 98  F (36.7  C)  Heart Rate:  [69-92] 92  Resp:  [16-20] 18  BP: (119-137)/(61-88) 137/66  SpO2:  [91 %-96 %] 93 %    Intake/Output Summary (Last 24 hours) at 10/1/2019 0911  Gross per 24 hour   Intake 480 ml   Output 1750 ml   Net -1270 ml     GEN: NAD  Pulm: bibasilar inspiratory rales, left greater than right.  Cardiac: Distant heart sounds secondary to body habitus. Unable to appreciate JVP given body habitus. No appreciable murmur.  No LE edema.  Vascular: warm, well perfused.    GI: soft, non distended  Neuro: Alert and oriented x4.    Medications:    atorvastatin  40 mg Oral QPM     DULoxetine  60 mg Oral Daily     miconazole   Topical BID     multivitamin w/minerals  1 tablet Oral or Feeding Tube Daily     pantoprazole  40 mg Oral BID AC     sodium chloride (PF)  3 mL Intracatheter Q8H     vitamin B1  100 mg Oral or Feeding Tube Daily       - MEDICATION INSTRUCTIONS -       - MEDICATION INSTRUCTIONS -       - MEDICATION INSTRUCTIONS -       Warfarin Therapy Reminder         Labs:   CMP  Recent Labs   Lab 10/01/19  0548 09/30/19  0625 09/29/19  0614 09/28/19  0705    142 142 142   POTASSIUM 3.7 4.0 4.2 3.9   CHLORIDE 109 109 110* 110*   CO2 25 25 24 23   ANIONGAP 8 8 8 9   GLC 94 100* 92 96   BUN 17 14 13 15   CR 0.72 0.64 0.68 0.77   GFRESTIMATED >90 >90 >90 85   GFRESTBLACK >90 >90 >90 >90   MARIA INES 9.4 9.2 8.9 8.9   MAG 1.9 2.2 2.3 2.2   PHOS 3.9 4.0 3.3 3.1   PROTTOTAL 6.8 7.1 6.9 6.8   ALBUMIN 2.8* 2.7* 2.7* 2.5*   BILITOTAL 0.3 0.3 0.4 0.3   ALKPHOS 66 64 62 66   AST 11 20 16 9   ALT 23 26 26 20     CBC  Recent Labs   Lab 10/01/19  0548 09/30/19  0625 09/29/19  0614  09/28/19  0705   WBC 6.7 6.8 7.5 8.7   RBC 3.32* 3.33* 3.17* 3.14*   HGB 9.8* 9.8* 9.4* 9.2*   HCT 32.1* 32.2* 31.1* 30.6*   MCV 97 97 98 98   MCH 29.5 29.4 29.7 29.3   MCHC 30.5* 30.4* 30.2* 30.1*   RDW 17.5* 17.3* 17.1* 17.0*   PLT 71* 103* 81* 75*     INR  Recent Labs   Lab 10/01/19  0548 09/30/19  0625 09/29/19  0614 09/28/19  0705   INR 2.47* 2.19* 1.95* 1.65*     ASSESSMENT/PLAN:  Shira Rodriguez is a 59 F with a history of hypertension, hyperlipidemia, sleep apnea, morbid obesity, and chronic bronchitis who presented to [facility] with massive PE with following PEA arrest 9/9 and ROSC after intubation/resuscitation.    Changes Today:  - 40mg IV lasix BID  - resume 5mg lisinopril daily  - ARU Thursday or Friday this week  - portable CXR    # Acute Hypoxemic Respiratory Failure  # Volume Overload  Estimated dry weight unclear.  Has been as low as 379 lbs this admission.  Had been receiving 60mg IV lasix daily; transitioned to 20mg IV daily 09/26 and 09/28.  Received 40mg IV lasix 09/29.  Still intermittently requiring some oxygen at rest; requiring 2-4L with activity.  Difficult volume status due to morbid obesity, but has inspiratory rales to bilateral lung bases.  Renal function stable.  - give 40mg IV lasix twice daily  - order portable CXR     # PEA arrest secondary to massive pulmonary embolism  # Hypoxic respiratory failure - improving  # Volume overload - improving  Transferred to [facility] for [facility] ECMO, and IR thrombectomy and catheter directed lytics on 9/9, went for VA ECMO decannulation on 9/17 complicated by hypercarbic respiratory failure requiring placement of VV ECMO through RIJ-FV bypass. Now VV ECMO s/p decannulation on 9/19. Off sedation 9/22, extubated 9/22. Repeat CT with no residual clot in PA. RV function improved to normal. She is being bridged to a therapeutic INR and has minimal oxygen requirements at this stage s/p diuresis - requiring with activity and at night. CXR 9/27 with continued  pulmonary edema. Will continue with intermittent lasix. INR 2.19 today; heparin discontinued 09/29  - Left groin incision with retention sutures. Sutures removed this morning.  - Warfarin, goal INR 2-3. Pharmacy to dose  - Duonebs PRN; Acapella qid   - Wean oxygen as able     # Sinus Pause  Noted overnight 09/29 while sleeping; length of 3.6 seconds.  Discussed with EP; no need for acute intervention. Will stop BB and plan to discharge the patient on monitor  - Holter monitor x14 days at discharge  - EP consult; appreciate formal recommendations     # Acute Thrombocytopenia  Thrombocytopenia 60-80k baseline 180k. Today, 71k. Had precipitous drop in platelets after admission - suspected due to consumption. 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   - CBC daily     # Afib/Aflutter  Noted to be in atrial fibrillation while in the ICU and on dobutamine, which was thought to be exacerbating factor. Dobutamine discontinued 9/23 and patient was given amiodarone bolus at that time. She converted to sinus. On 9/25 she went into atrial flutter with variable block. Again was given an amio bolus, fluid bolus, and IV metop x1. Continues to intermittently go into a-fib - rates now controlled. Has been in sinus rhythm x24 hours.  Seen by EP 09/30 given sinus pause; see recommendations as above  - hold all further BB  - warfarin as above     #COPD  # History of tobacco abuse  # Aspiration pneumonia - resolved  Sputum cultures:               - 9/11 MSSA: vancomycin/zosyn x5 days for ECMO, vanc last dose 9/15, cefazolin on 9/17-9/22               - 9/20 Serratia (ceftriaxone susceptible): Ceftriaxone 9/23-9/28   - s/p prednisone burst     # History of depression  Cymbalta held while in ICU. Will resume at lower dose over next few days, and increase to PTA dose. Additionally reports that she may have been on Wellbutrin, however, notes regarding this drug state she had increased crying episodes while on this medication and it  appears to have been stopped.  - up titrated to home 60mg Cymbalta      # HTN   BP slightly hypertensive today  - start 5mg lisinopril today  - continue to hold PTA spironolactone     # HLD  - Atorvastatin 40mg     # CYRUS   Patient diagnosed with sleep apnea, but refuses to use BiPAP/CPAP at home due to mask discomfort.  - Oxygen at night PRN to keep O2 sats >90%.     # Rheumatoid Arthritis  - holding PTA methotrexate/Wellcovorin while inpatient      Anticipated Disposition: pending diuresis    Patient seen and discussed with Dr. Raffaele Stuart, who agrees with above plan.    Annel Dobson PA-C  Cardiology - [facility]      I have seen and examined the patient with the CSI team. I agree with the assessment and plan of the note above.I have reviewed pertinent labs.     Raffaele Stuart MD  Interventional Cardiology  Pager: [phone]

## 2019-10-01 NOTE — PLAN OF CARE
D:pt assist of one general weakness thru out body, able to ambulate short distance to door  A:continue to use walker  I:pace activities  P:per Primary

## 2019-10-01 NOTE — PLAN OF CARE
NEURO: alert and oriented  x4. Pt reported some anxiety about physical progress since ICU and her future. Staff provided reassurance and support.   RESPIRATORY:  LS diminished in bases, slight expiratory wheeze. Pt declined intervention such as a neb. Denied SOB at rest, dyspnea with activity.  SpO2>93% on 1.5 LPM NC. Chest xray.   CARDIO: Sinus rhythm. VSS. Lasix given   GI/: BS active, + gas. Voiding without difficulty. After lasix dose, purewick external catheter placed per pt request. Purewick catheter removed prior to PT.  Adequate PO intake. Potassium and magnesium replaced.   SKIN: WOCN changed dressings and assessed wounds. See note.   ACTIVITY: Up with assist 1 and walker to commode and chair. Up in chair x2.   PAIN: Denied pain at rest, reported chest pain with deep breaths and coughing related to CPR.   LINES: PIV saline locked, site WNL.   PLAN: Continue POC, notify MD with concerns.

## 2019-10-01 NOTE — PLAN OF CARE
OT/6C:  Discharge Planner OT   Patient plan for discharge: Rehab.   Current status: Patient completes SPT to bedside commode with close SBA. Max YULIANA pericares. Completes seated ADL routines at sink with set-up and cues for modulating rest breaks. Requires 2-3L O2 to maintain sats >92% (desats to 88-89% when initiating activity on 1L O2). Does fatigue quickly with SOB and chest/rib pain (reports unchanged). Engaged in UE AROM exercises within pain tolerance. Patient becoming emotional regarding medical course/level of deconditioning. Provided therapeutic listening throughout.   Barriers to return to prior living situation: Medical Status, Activity Tolerance, Weakness, SOB, Pain, O2 needs  Recommendations for discharge: ARU vs TCU.   Rationale for recommendations: Patient well below baseline ADL independence. Would benefit from ongoing therapies to facilitate return to independence. Anticipate could tolerate 3 hours of therapy with incorporation of rest breaks, though does fatigue quickly.        Entered by: Mari Howard 10/01/2019 9:24 AM

## 2019-10-01 NOTE — PROGRESS NOTES
WO Nurse Inpatient Wound Assessment   Reason for consultation: Evaluate and treat Pannus wound     Assessment  Pannus wound due to Intertrigo  Status:Follow up assessment-Improving    Right Groin-ECMO Cannula site-Cardiology removed sutures 10/1 and is currently managing with orders written. Cardiology noted that wound with slight dehiscence. WOC not currently following    Treatment Plan  Pannus wound: BID   1. Cleanse with gentle soap and water  2. Apply Xeroform (#[id number]) to open wound only  3. Cover with ABD or Exudry    Orders Reviewed  Recommended provider order: NA  WOC Nurse follow-up plan:weekly  Nursing to notify the Provider(s) and re-consult the WOC Nurse if wound(s) deteriorates or new skin concern.    Patient History  According to provider note(s):  Shira Rodriguez is a 59 F with a history of hypertension, hyperlipidemia, sleep apnea, morbid obesity, and chronic bronchitis who presented to [facility] with massive PE with following PEA arrest 9/9 and ROSC after intubation/resusitation. Transferred here for VA ECMO thrombectomy and catheter directed lytics on 9/9, went for VA ECMO decannulation on 9/17 complicated by hypercarbic respiratory failure s/p VV ECMO through RIJ-FV bypass s/p decannulation on 9/19. Repeat CT with no residual clot in PA. RV function improved to normal. She is being bridged to a therapeutic INR and has minimal oxygen requirements at this stage.     Objective Data  Containment of urine/stool: Incontinence Protocol    Active Diet Order  Orders Placed This Encounter      2 Gram Sodium Diet      Output:   I/O last 3 completed shifts:  In: 720 [P.O.:720]  Out: 2100 [Urine:2100]    Risk Assessment:   Sensory Perception: 4-->no impairment  Moisture: 3-->occasionally moist  Activity: 2-->chairfast  Mobility: 2-->very limited  Nutrition: 3-->adequate  Friction and Shear: 3-->no apparent problem  Ole Score: 17                          Labs:   Recent Labs   Lab 10/01/19  0548   ALBUMIN  2.8*   HGB 9.8*   INR 2.47*   WBC 6.7       Physical Exam  Skin inspection: focused Pannus    Wound Location:  Pannus       Left Pannus                                                          Right Pannus    Date of last photo 9/25/19  Wound History: Pt obese with excessive moisture/sweat in deep folds.   Measurements (length x width x depth, in cm)Left side resolved 10/1   Right pannus 0.2 cm x 11 cm  x  0.1 cm   Wound Base: 100 % pink/tan moist tissue  Tunneling N/A  Undermining N/A  Palpation of the wound bed: normal   Periwound skin: intact  Color: normal and consistent with surrounding tissue  Temperature: normal   Drainage:, scant  Description of drainage: serous  Odor: none  Pain: denies , none    Interventions  Current support surface: Bariatric Atmos Air mattress  Current off-loading measures: Pillows under calves  Visual inspection of wound(s) completed  Wound Care: completed by RN  Supplies: at bedside  Education provided: wound progress  Discussed plan of care with Patient    Kevin Quinones RN BSN CWOCN

## 2019-10-01 NOTE — PLAN OF CARE
"D: Pt admitted with massive PE with following PEA arrest 9/9 and ROSC after intubation/resusitation. PMH HTN, HLD, CYRUS, morbid obesity, and chronic bronchitis       I/A: Monitored vitals and assessed pt status.      /64 (BP Location: Left arm)   Pulse 81   Temp 97.8  F (36.6  C) (Oral)   Resp 16   Ht 1.676 m (5' 5.98\")   Wt (!) 173.1 kg (381 lb 9.6 oz)   SpO2 93%   BMI 61.62 kg/m          Neuro: A&O x 4.   Cardiac: SB/SR. Occasional 3-4 second pauses in HR; none observed this shift. Pt reports chest pain with cough and where compressions were done.  Respiratory: sating >92 on RA. SOB with activity  Diet: 2 gram sodium  GI/:   Good urine output, voids without diffculty using purewick or commode  Activity: assist of 1 to commode w/ walker   Skin: see flowsheets  LDAs: L PIV SL     P: Continue to monitor Pt status and report changes to CSI. Plan to discharge to TCU.      POC [id number]  Mindi Nair RN on 10/1/2019 at 6:33 AM    "

## 2019-10-01 NOTE — PROGRESS NOTES
CVTS    Sutures removed from left groin s/p ECMO decan on 9/17. There is some superficial wound dehiscence, only 1cm deep. Deep wound appears intact. No erythema or drainage, no signs of infection. Dry gauze placed in wound. Please change dressing twice daily, cleanse with gentle soap and water, apply Xeroform to open wound only and cover with ABD or Exudry. Will check on wound tomorrow.     Garland Spence PA-C  Cardiothoracic Surgery  October 1, 2019 7:39 AM   p: [phone]

## 2019-10-02 ENCOUNTER — APPOINTMENT (OUTPATIENT)
Dept: PHYSICAL THERAPY | Facility: CLINIC | Age: 59
DRG: 003 | End: 2019-10-02
Payer: COMMERCIAL

## 2019-10-02 LAB
ALBUMIN SERPL-MCNC: 2.8 G/DL (ref 3.4–5)
ALP SERPL-CCNC: 66 U/L (ref 40–150)
ALT SERPL W P-5'-P-CCNC: 24 U/L (ref 0–50)
ANION GAP SERPL CALCULATED.3IONS-SCNC: 9 MMOL/L (ref 3–14)
AST SERPL W P-5'-P-CCNC: 11 U/L (ref 0–45)
BILIRUB SERPL-MCNC: 0.3 MG/DL (ref 0.2–1.3)
BUN SERPL-MCNC: 18 MG/DL (ref 7–30)
CALCIUM SERPL-MCNC: 9 MG/DL (ref 8.5–10.1)
CHLORIDE SERPL-SCNC: 106 MMOL/L (ref 94–109)
CO2 SERPL-SCNC: 26 MMOL/L (ref 20–32)
CREAT SERPL-MCNC: 0.76 MG/DL (ref 0.52–1.04)
ERYTHROCYTE [DISTWIDTH] IN BLOOD BY AUTOMATED COUNT: 17.8 % (ref 10–15)
GFR SERPL CREATININE-BSD FRML MDRD: 86 ML/MIN/{1.73_M2}
GLUCOSE SERPL-MCNC: 94 MG/DL (ref 70–99)
HCT VFR BLD AUTO: 33.2 % (ref 35–47)
HGB BLD-MCNC: 10 G/DL (ref 11.7–15.7)
INR PPP: 2.7 (ref 0.86–1.14)
MAGNESIUM SERPL-MCNC: 2.2 MG/DL (ref 1.6–2.3)
MCH RBC QN AUTO: 29 PG (ref 26.5–33)
MCHC RBC AUTO-ENTMCNC: 30.1 G/DL (ref 31.5–36.5)
MCV RBC AUTO: 96 FL (ref 78–100)
PHOSPHATE SERPL-MCNC: 4.4 MG/DL (ref 2.5–4.5)
PLATELET # BLD AUTO: 90 10E9/L (ref 150–450)
POTASSIUM SERPL-SCNC: 3.8 MMOL/L (ref 3.4–5.3)
PROT SERPL-MCNC: 7 G/DL (ref 6.8–8.8)
RBC # BLD AUTO: 3.45 10E12/L (ref 3.8–5.2)
SODIUM SERPL-SCNC: 140 MMOL/L (ref 133–144)
WBC # BLD AUTO: 6.8 10E9/L (ref 4–11)

## 2019-10-02 PROCEDURE — 85027 COMPLETE CBC AUTOMATED: CPT | Performed by: INTERNAL MEDICINE

## 2019-10-02 PROCEDURE — 25000132 ZZH RX MED GY IP 250 OP 250 PS 637: Performed by: PHYSICIAN ASSISTANT

## 2019-10-02 PROCEDURE — 97530 THERAPEUTIC ACTIVITIES: CPT | Mod: GP

## 2019-10-02 PROCEDURE — 25000132 ZZH RX MED GY IP 250 OP 250 PS 637: Performed by: STUDENT IN AN ORGANIZED HEALTH CARE EDUCATION/TRAINING PROGRAM

## 2019-10-02 PROCEDURE — 83735 ASSAY OF MAGNESIUM: CPT | Performed by: INTERNAL MEDICINE

## 2019-10-02 PROCEDURE — 36415 COLL VENOUS BLD VENIPUNCTURE: CPT | Performed by: INTERNAL MEDICINE

## 2019-10-02 PROCEDURE — 97116 GAIT TRAINING THERAPY: CPT | Mod: GP

## 2019-10-02 PROCEDURE — 25000132 ZZH RX MED GY IP 250 OP 250 PS 637: Performed by: SURGERY

## 2019-10-02 PROCEDURE — 25000128 H RX IP 250 OP 636: Performed by: PHYSICIAN ASSISTANT

## 2019-10-02 PROCEDURE — 99233 SBSQ HOSP IP/OBS HIGH 50: CPT | Performed by: PHYSICIAN ASSISTANT

## 2019-10-02 PROCEDURE — 21400000 ZZH R&B CCU UMMC

## 2019-10-02 PROCEDURE — 80053 COMPREHEN METABOLIC PANEL: CPT | Performed by: INTERNAL MEDICINE

## 2019-10-02 PROCEDURE — 85610 PROTHROMBIN TIME: CPT | Performed by: INTERNAL MEDICINE

## 2019-10-02 PROCEDURE — 84100 ASSAY OF PHOSPHORUS: CPT | Performed by: INTERNAL MEDICINE

## 2019-10-02 RX ORDER — WARFARIN SODIUM 5 MG/1
5 TABLET ORAL
Status: DISCONTINUED | OUTPATIENT
Start: 2019-10-02 | End: 2019-10-02

## 2019-10-02 RX ADMIN — ATORVASTATIN CALCIUM 40 MG: 40 TABLET, FILM COATED ORAL at 21:07

## 2019-10-02 RX ADMIN — PANTOPRAZOLE SODIUM 40 MG: 40 TABLET, DELAYED RELEASE ORAL at 16:02

## 2019-10-02 RX ADMIN — MICONAZOLE NITRATE: 20 POWDER TOPICAL at 21:08

## 2019-10-02 RX ADMIN — MULTIPLE VITAMINS W/ MINERALS TAB 1 TABLET: TAB at 07:43

## 2019-10-02 RX ADMIN — FUROSEMIDE 40 MG: 10 INJECTION, SOLUTION INTRAVENOUS at 07:49

## 2019-10-02 RX ADMIN — ACETAMINOPHEN 650 MG: 325 TABLET, FILM COATED ORAL at 07:57

## 2019-10-02 RX ADMIN — PANTOPRAZOLE SODIUM 40 MG: 40 TABLET, DELAYED RELEASE ORAL at 07:43

## 2019-10-02 RX ADMIN — DULOXETINE HYDROCHLORIDE 60 MG: 30 CAPSULE, DELAYED RELEASE ORAL at 07:43

## 2019-10-02 RX ADMIN — THIAMINE HCL TAB 100 MG 100 MG: 100 TAB at 07:43

## 2019-10-02 RX ADMIN — LISINOPRIL 5 MG: 2.5 TABLET ORAL at 07:44

## 2019-10-02 RX ADMIN — MICONAZOLE NITRATE: 20 POWDER TOPICAL at 07:49

## 2019-10-02 RX ADMIN — FUROSEMIDE 40 MG: 10 INJECTION, SOLUTION INTRAVENOUS at 16:02

## 2019-10-02 RX ADMIN — ACETAMINOPHEN 650 MG: 325 TABLET, FILM COATED ORAL at 21:10

## 2019-10-02 RX ADMIN — POTASSIUM CHLORIDE 20 MEQ: 750 TABLET, EXTENDED RELEASE ORAL at 07:43

## 2019-10-02 ASSESSMENT — ACTIVITIES OF DAILY LIVING (ADL)
ADLS_ACUITY_SCORE: 14
ADLS_ACUITY_SCORE: 16

## 2019-10-02 ASSESSMENT — MIFFLIN-ST. JEOR: SCORE: 2299.3

## 2019-10-02 ASSESSMENT — PAIN DESCRIPTION - DESCRIPTORS: DESCRIPTORS: ACHING

## 2019-10-02 NOTE — PROGRESS NOTES
[facility]   Cardiology Progress      Interval History: Feels her breathing is improved.  Denies nausea, vomiting, chest pain, SOB, cough, abdominal pain, dysuria, new LE pain/swelling.    Physical Exam:  Temp:  [97.7  F (36.5  C)-98  F (36.7  C)] 98  F (36.7  C)  Pulse:  [] 87  Heart Rate:  [] 84  Resp:  [18] 18  BP: (106-132)/(58-75) 115/75  SpO2:  [92 %-97 %] 94 %    Intake/Output Summary (Last 24 hours) at 10/2/2019 1020  Gross per 24 hour   Intake 680 ml   Output 2965 ml   Net -2285 ml     GEN: NAD  Pulm: bibasilar rales.  No appreciable rhonchi  Cardiac: Distant heart sounds secondary to body habitus. JVP not appreciably elevated; body habitus limits this exam. No appreciable murmur.  No LE edema.  Left Groin: incision packed, bandage in place. No appreciable hematoma  GI: soft, non distended  Neuro:  Alert and oriented x4.    Medications:    atorvastatin  40 mg Oral QPM     DULoxetine  60 mg Oral Daily     furosemide  40 mg Intravenous BID     lisinopril  5 mg Oral Daily     miconazole   Topical BID     multivitamin w/minerals  1 tablet Oral or Feeding Tube Daily     pantoprazole  40 mg Oral BID AC     sodium chloride (PF)  3 mL Intracatheter Q8H     vitamin B1  100 mg Oral or Feeding Tube Daily     warfarin ANTICOAGULANT  5 mg Oral ONCE at 18:00       - MEDICATION INSTRUCTIONS -       - MEDICATION INSTRUCTIONS -       - MEDICATION INSTRUCTIONS -       Warfarin Therapy Reminder         Labs:   CMP  Recent Labs   Lab 10/02/19  0545 10/01/19  0548 09/30/19  0625 09/29/19  0614    142 142 142   POTASSIUM 3.8 3.7 4.0 4.2   CHLORIDE 106 109 109 110*   CO2 26 25 25 24   ANIONGAP 9 8 8 8   GLC 94 94 100* 92   BUN 18 17 14 13   CR 0.76 0.72 0.64 0.68   GFRESTIMATED 86 >90 >90 >90   GFRESTBLACK >90 >90 >90 >90   MARIA INES 9.0 9.4 9.2 8.9   MAG 2.2 1.9 2.2 2.3   PHOS 4.4 3.9 4.0 3.3   PROTTOTAL 7.0 6.8 7.1 6.9   ALBUMIN 2.8* 2.8* 2.7* 2.7*   BILITOTAL 0.3 0.3 0.3 0.4    ALKPHOS 66 66 64 62   AST 11 11 20 16   ALT 24 23 26 26     CBC  Recent Labs   Lab 10/02/19  0545 10/01/19  0548 09/30/19  0625 09/29/19  0614   WBC 6.8 6.7 6.8 7.5   RBC 3.45* 3.32* 3.33* 3.17*   HGB 10.0* 9.8* 9.8* 9.4*   HCT 33.2* 32.1* 32.2* 31.1*   MCV 96 97 97 98   MCH 29.0 29.5 29.4 29.7   MCHC 30.1* 30.5* 30.4* 30.2*   RDW 17.8* 17.5* 17.3* 17.1*   PLT 90* 71* 103* 81*     INR  Recent Labs   Lab 10/02/19  0545 10/01/19  0548 09/30/19  0625 09/29/19  0614   INR 2.70* 2.47* 2.19* 1.95*     ASSESSMENT/PLAN:  Shira Rodriguez is a 59 F with a history of hypertension, hyperlipidemia, sleep apnea, morbid obesity, and chronic bronchitis who presented to [facility] with massive PE with following PEA arrest 9/9 and ROSC after intubation/resuscitation.     Changes Today:  - conitnue 40mg IV lasix BID  - ARU this weekend; needs to be on oral diuretic x24 hours prior to discharge     # Acute Hypoxemic Respiratory Failure  # Bilateral Plueral Effusions, small  # Volume Overload  Estimated dry weight unclear.  Had been receiving 60mg IV lasix daily; transitioned to 20mg IV daily 09/26 and 09/28.  Received 40mg IV lasix 09/29.  Still intermittently requiring some oxygen at rest; requiring 2-4L with activity.  Difficult volume status due to morbid obesity, but has inspiratory rales to bilateral lung bases.  Initated on 40mg IV lasix BID 10/01 with adequate urine response.  Portable CXR 10/01 shows pulmonary edema, albeit improves as comapred to previous exams. Continues to have bibasilar inspiratory rales on physical exam.  Renal function remains stable.  - continue 40mg IV lasix twice daily    # Nectrotic Left Lower Lobe  Noted on CT scan 09/21; does not appear this was communicated with day cardiology team and as such, has not been addressed    - consult pulmonology today; they were contacted via telephone by this writer.  Appreciate recommendations.     # PEA arrest secondary to massive pulmonary embolism  # Hypoxic  respiratory failure - improving  # Volume overload - improving  Transferred to [facility] for VA ECMO, and IR thrombectomy and catheter directed lytics on 9/9. Went for VA ECMO decannulation on 9/17 which was complicated by hypercarbic respiratory failure requiring placement of VV ECMO through RIJ-FV bypass. VV ECMO was decannulated on 9/19. The patient has been off sedation since 9/22, and was extubated 9/22. Repeat CT with no residual clot in PA on 09/21, but necrotic LLL as above. RV function improved to normal. INR theraputic as of 09/30/2019.  Please see above discussion re: fluid.  Left groin sutures removed 10/01.  - Warfarin, goal INR 2-3. Pharmacy to dose  - Duonebs PRN; Acapella qid   - Wean oxygen as able     # Sinus Pause  Noted overnight 09/29 while sleeping; length of 3.6 seconds.  Discussed with EP; no need for acute intervention. Will stop BB and plan to discharge the patient on monitor  - Holter monitor x14 days at discharge  - EP consult; appreciate formal recommendations     # Acute Thrombocytopenia  Thrombocytopenia 60-80k baseline 180k. Today, 90k. Had precipitous drop in platelets after admission - suspected due to consumption. 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   - CBC daily     # Afib/Aflutter  Noted to be in atrial fibrillation while in the ICU and on dobutamine, which was thought to be exacerbating factor. Dobutamine discontinued 9/23 and patient was given amiodarone bolus at that time. She converted to sinus. On 9/25 she went into atrial flutter with variable block. Again was given an amio bolus, fluid bolus, and IV metop x1. Continues to intermittently go into a-fib - rates now controlled. Has been in sinus rhythm x24 hours.  Seen by EP 09/30 given sinus pause; see recommendations as above  - hold all further BB  - warfarin as above     #COPD  # History of tobacco abuse  # Aspiration pneumonia - resolved  Sputum cultures:               - 9/11 MSSA: vancomycin/zosyn x5 days for  ECMO, vanc last dose 9/15, cefazolin on 9/17-9/22               - 9/20 Serratia (ceftriaxone susceptible): Ceftriaxone 9/23-9/28   - s/p prednisone burst     # History of depression  Cymbalta held while in ICU. Will resume at lower dose over next few days, and increase to PTA dose. Additionally reports that she may have been on Wellbutrin, however, notes regarding this drug state she had increased crying episodes while on this medication and it appears to have been stopped.  - up titrated to home 60mg Cymbalta      # HTN   BP improved today  - start 5mg lisinopril today  - continue to hold PTA spironolactone     # HLD  - Atorvastatin 40mg     # CYRUS   Patient diagnosed with sleep apnea, but refuses to use BiPAP/CPAP at home due to mask discomfort.  - Oxygen at night PRN to keep O2 sats >90%.      # Rheumatoid Arthritis  - holding PTA methotrexate/Wellcovorin while inpatient          Anticipated Disposition: pending diuresis, evaluation of necrotic LLL    Patient seen and discussed with Dr. Grant Stuart, who agrees with above plan.    LOLA LopezC  Cardiology - [facility]

## 2019-10-02 NOTE — CONSULTS
[facility]     Pulmonary, Allergy, Critical Care and Sleep Medicine     Pulmonary Consult Note    Date of service: 10/2/2019    Patient: Shira Rodriguez      : 1960      MRN: [id number]    We were consulted by Dr. Dobson for evaluation of possible LLL necrotizing pneumonia.        Impressions/Recommendations:   59 year old female with PMHx most significant for HTN, HLD, CYRUS, obesity and chronic bronchitis that presented to [facility] on 2019 with a massive PE with PEA arrest.  Now s/p mechanical thrombectomy, lytics and VA ECMO.  Developed aspiration pneumonia.  Extubated on .  Now improving overall.  CT with possible LLL necrotizing infection on .      # LLL opacity  Complex history with known, now treated, aspiration PNA.  LLL atelectasis vs necrotizing infection seen in on CT on .   Associated left pleural effusion which could be transudate from anasarca and cardiac dysfunction or parapneumonic/empyema.  Clinically has been improving so likelihood of infection is less but important to reassess for presence of pleural effusion and if present obtain diagnostic sample.  Due to body habitus would have IR perform thoracic ultrasound and diagnostic thoracentesis if adequate effusion is visualized.  Otherwise, repeat CT in 2-3 weeks.      Recommendations:  - IR for ultrasound and diagnostic left thoracentesis, send for cell counts, LDH, total protein, and cultures  - Repeat CT chest in 2-3 weeks to follow resolution of PNA  - Continue flutter valve for help with expectoration   - Consider addition of saline nebs to assist with mobilization of thick secretions    Pulmonary will continue to follow.      Patient seen & discussed w/  Dr. Navarro who agrees with the plan.     Geoff Mary MD, MPH  Pulmonary & Critical Care, PGY-6  [phone]          History of Present Illness:   Shira Rodriguez is a 59 year old female with PMHx most significant for HTN, HLD, CYRUS, obesity and chronic  bronchitis that presented to [facility] on 9/9/2019 with a massive PE with PEA arrest.  Now s/p mechanical thrombectomy, lytics and VA ECMO.  Developed aspiration pneumonia.  Extubated on 9/22.  Now improving overall.  CT with possible LLL necrotizing infection on 9/21.     Per chart and team, CT chest obtained on 9/21 for assessment of clot burden.  Noted to have a possible necrotizing LLL at that time.  No change in clinical status.  Extubated on 9/22 uneventfully.      Patient reports she has overall been making progress each day and continues to feel better overall.  She has a cough that is productive of thick brown sputum.  Using IS and flutter to help with mobilization.      Active smoker prior to admission.  1ppd for 25 years.  Was planning to quit just prior to hospitalization.           Review of Symptoms:   10-point ROS reviewed, & found negative w/ exceptions noted in the HPI.          Past Medical History:     Past Medical History:   Diagnosis Date     Chronic bronchitis (H) 07/30/2015     Contact dermatitis      Depressive disorder 1985     Eczema     HANDS     Elevated liver enzymes      H/O total knee replacement, left      History of total hip replacement 10/27/2011     Hyperlipidemia      Hypertension      Morbid obesity (H)      Osteoarthrosis     right knee     Sleep apnea      Tobacco use disorder        Past Surgical History:   Procedure Laterality Date     ARTHROPLASTY KNEE  6/14/2012    Procedure: ARTHROPLASTY KNEE;  Left Total Knee Arthroplasty;  Surgeon: Annette Quesada MD;  Location: [facility] OR     ARTHROSCOPY HIP Right      BRONCHOSCOPY FLEXIBLE AND RIGID N/A 9/17/2019    Procedure: Bronchoscopy flexible;  Surgeon: Patrick Rayo MD;  Location:  OR     C TOTAL HIP ARTHROPLASTY  07/09/04    RT     CV EXTRACORPERAL MEMBRANE OXYGENATION N/A 9/9/2019    Procedure: Extracorporeal Membrance Oxygenation;  Surgeon: Kaleb Mcfarlane MD;  Location:  HEART CARDIAC CATH LAB     INSERT  "EXTRACORPORAL MEMBRANE OXYGENATOR N/A 9/17/2019    Procedure: Venous-Venous cannulation using ultrasound guidance and transesophageal echocardiogram, venous-arterial ecmo decannulation and repair of left femoral artery;  Surgeon: Patrick Rayo MD;  Location: [facility] OR     IR PULMONARY ANGIOGRAM BILATERAL  9/10/2019            Allergies:     Allergies   Allergen Reactions     Adhesive Tape Blisters     Erythromycin Rash             Outpatient Medications:     No current facility-administered medications on file prior to encounter.   cyclobenzaprine (FLEXERIL) 10 MG tablet, Take 1 tablet (10 mg) by mouth nightly as needed for muscle spasms  DULoxetine (CYMBALTA) 60 MG EC capsule, TK ONE C PO  QAM  folic acid (FOLVITE) 1 MG tablet, Take 4 tablets (4 mg) by mouth daily Take 3-4 tablets daily  insulin syringe-needle U-100 (BD INSULIN SYRINGE ULTRAFINE) 30G X 1/2\" 1 ML, For methotrexate use weekly  leucovorin (WELLCOVORIN) 5 MG tablet, Take 1 tablet (5 mg) by mouth every 7 days *take 24h after taking weekly methotrexate dose  lisinopril (PRINIVIL/ZESTRIL) 10 MG tablet, TAKE 1 TABLET BY MOUTH DAILY  methotrexate 50 MG/2ML injection CHEMO, Inject 1 mL (25 mg) Subcutaneous every 7 days Inject 0.8 mL (20 mg) weekly  methylPREDNISolone (MEDROL DOSEPAK) 4 MG tablet, Follow package instructions (Patient not taking: Reported on 6/26/2019)  nystatin (MYCOSTATIN) 384125 UNIT/GM external powder, Apply to coat rash tid, neck and breast area rashes  spironolactone (ALDACTONE) 25 MG tablet, Take 1 tablet (25 mg) by mouth daily  varenicline (CHANTIX STARTING MONTH PAK) 0.5 MG X 11 & 1 MG X 42 tablet, Take 0.5 mg by mouth daily AND 0.5 mg 2 times daily AND 1 mg 2 times daily. (Patient not taking: No sig reported)              Family History:     Family History   Problem Relation Age of Onset     Thyroid Disease Mother      Hypertension Mother      Skin Cancer Mother         MMohs surgery with skin graft     Cerebrovascular Disease " "Mother         CVA after endarderectomy     Diabetes Mother      Breast Cancer Paternal Grandmother      Pancreatic Cancer Paternal Grandmother      Cardiovascular Paternal Aunt      Cardiovascular Paternal Uncle      Depression Other      Alcoholism Father      Thyroid Disease Sister      Alcoholism Sister      Hypertension Maternal Grandmother      Heart Disease Sister         multiple ablations     Alcoholism Sister      Prostate Cancer Paternal Grandfather                Social History:     Social History     Tobacco Use     Smoking status: Current Every Day Smoker     Packs/day: 1.00     Years: 33.00     Pack years: 33.00     Types: Cigarettes     Start date: 1/1/1982     Smokeless tobacco: Never Used     Tobacco comment: maybe   Substance Use Topics     Alcohol use: Yes     Alcohol/week: 0.0 standard drinks     Comment: occassional     Drug use: No             Physical Exam:   /70 (BP Location: Right arm)   Pulse 87   Temp 97.8  F (36.6  C) (Oral)   Resp 20   Ht 1.676 m (5' 5.98\")   Wt (!) 170.3 kg (375 lb 6.4 oz)   SpO2 94%   BMI 60.62 kg/m      General: NAD, seated in chair  HEENT: Anicteric sclera, EOMI, MMM; no cervical LAD  CV: RRR, no m/r/g  Lungs: Crackles on the left base, no wheeze, right clear  Abd: Soft, obese, NT, ND  Ext: WWP, bilateral LE edema  Skin: No rashes, cyanosis, or jaundice  Neuro: AAOx3, no focal deficits          Data:   Labs (all laboratory studies reviewed by me):   WBC 6.8  Hgb 10.0  Plt 90    Imaging (all imaging studies reviewed by me):  CT chest 9/21 - LLL atelectasis vs necrotic lung infection, bilateral GGO.  No PE    CXR 10/1 - Stable effusions, improving edema        "

## 2019-10-02 NOTE — PLAN OF CARE
D: Pt admitted 9/9 following massive PE with PEA arrest (s/p ECMO). Hx HTN, HLD, CYRUS, chronic bronchitis. Course c/b intermittent afib/flutter.  I: Monitored vitals and assessed pt status. Drips? PRNs?  A: A0x4. VSS on 2LPM via NC. SR on tele. Afebrile. Denies pain, SOB at rest, dizziness, nausea. Wound dsg on pannus c/d/i. On pulsate mattress, refusing side-to-side positioning. Pt appeared to sleep comfortably between cares, making needs known.  P: Continue to monitor and report changes/concerns to CSI.

## 2019-10-02 NOTE — PLAN OF CARE
D:  Massive PE following PEA arrest 9/9, transferred from [facility].  History of HTN, HLD, CYRUS, chronic bronchitis and morbid obesity  I/A:  VSS on 1.5-2L NC, SR on monitor.  Up 2-assist with walker.  K replaced per protocol.  Independent w/ pulm hygiene  P:  Pulm consult.  Continue w/ plan of care, notify team w/ changes/concerns.  Possible discharge to ARU end of week

## 2019-10-02 NOTE — PLAN OF CARE
Pt s/p PEA arrest and Ecmo continues to recover slowly. Worked with PT early in the shift and now back in bed on a bariatric air bed for the rest of the evening. Does not want to turn side to side, coccyx intact.  Purewick catheter applied for lasix IV given with good output. SANTOS. O2 1.5-2lnc. Pannus wounds dressed on days, nystatin powder applied to groin and under breasts. SR 60s-90s.

## 2019-10-02 NOTE — PROGRESS NOTES
[facility], [place]    Brief Cardiology Note  Date of Service: 10/2/2019       Discussed case with pulmonology given necrotizing left lower lobe.  Given presence of LLL effusion, plan as follows:  - hold warfarin this evening; if INR is less than 2.0 tomorrow AM, will need heparin drip  - consult CAPS team for diagnostic thoracentesis; will need to order LDH, total protein, cell count, and fluid culture with thoracentesis  - on day of thoracentesis, will also need to order serum LDH and serum protein  - if CAPS is unable to preform thoracentesis, IR will need to be consulted  - will need repeat CT chest in 2-3 weeks.    This was communicated with the patient whom expresses understanding.      Annel Dobson PA-C

## 2019-10-02 NOTE — PLAN OF CARE
"Discharge Planner PT   Patient plan for discharge: Rehab  Current status: Amb bouts of 50', 40', 20' with FWW/SBA. Multiple sit<>stands with SBA. Fatigues quickly on RA. O2 desat to 86% with activity. 30\" sit<>stand: 3 reps.  Barriers to return to prior living situation: Deconditioning, fatigue  Recommendations for discharge: ARC  Rationale for recommendations: Current level of function. Can tolerate 3 hours of therapy daily.       Entered by: Toan Holguin 10/02/2019 3:58 PM     30-Second Sit to Stand Test:  The test is conducted with a straight back chair, without armrests, with a 17-inch seat height.    Patient Score (score =0 if must use arms)3 reps    The 30 Second Sit to Stand Test is considered a test of fall risk.  Data from [place] [employer], cosponsored by [place] Dept of Health:  If must use arms = High Fall Risk regardless of reps  8 or less times = High Fall Risk   9 to 12 times = Moderate Risk  13 or more times = Low Risk    The 30 Second Sit to Stand Test is also considered a test of leg strength and endurance.   Normative Data from Librado et al,. 2001  Age                 Reps: Men        Reps: Women  60-64                14-19                       12-17                               65-69                12-18                       11-16                    70-74                12-17                       10-15              75-79                11-17                       10-15                    80-84                10-15                         9-14  85-89                 8-14                          8-13  90-94                 7-12                          4-11    Assessment (rationale for performing, application to patient s function & care plan): assess strength/fall risk      "

## 2019-10-03 ENCOUNTER — APPOINTMENT (OUTPATIENT)
Dept: OCCUPATIONAL THERAPY | Facility: CLINIC | Age: 59
DRG: 003 | End: 2019-10-03
Payer: COMMERCIAL

## 2019-10-03 ENCOUNTER — APPOINTMENT (OUTPATIENT)
Dept: PHYSICAL THERAPY | Facility: CLINIC | Age: 59
DRG: 003 | End: 2019-10-03
Payer: COMMERCIAL

## 2019-10-03 LAB
ALBUMIN SERPL-MCNC: 2.8 G/DL (ref 3.4–5)
ALP SERPL-CCNC: 67 U/L (ref 40–150)
ALT SERPL W P-5'-P-CCNC: 24 U/L (ref 0–50)
ANION GAP SERPL CALCULATED.3IONS-SCNC: 8 MMOL/L (ref 3–14)
ANION GAP SERPL CALCULATED.3IONS-SCNC: 9 MMOL/L (ref 3–14)
AST SERPL W P-5'-P-CCNC: 13 U/L (ref 0–45)
BILIRUB SERPL-MCNC: 0.4 MG/DL (ref 0.2–1.3)
BUN SERPL-MCNC: 22 MG/DL (ref 7–30)
BUN SERPL-MCNC: 28 MG/DL (ref 7–30)
CALCIUM SERPL-MCNC: 8.9 MG/DL (ref 8.5–10.1)
CALCIUM SERPL-MCNC: 9.1 MG/DL (ref 8.5–10.1)
CHLORIDE SERPL-SCNC: 104 MMOL/L (ref 94–109)
CHLORIDE SERPL-SCNC: 105 MMOL/L (ref 94–109)
CO2 SERPL-SCNC: 24 MMOL/L (ref 20–32)
CO2 SERPL-SCNC: 25 MMOL/L (ref 20–32)
CREAT SERPL-MCNC: 0.79 MG/DL (ref 0.52–1.04)
CREAT SERPL-MCNC: 1.05 MG/DL (ref 0.52–1.04)
ERYTHROCYTE [DISTWIDTH] IN BLOOD BY AUTOMATED COUNT: 17.5 % (ref 10–15)
GFR SERPL CREATININE-BSD FRML MDRD: 58 ML/MIN/{1.73_M2}
GFR SERPL CREATININE-BSD FRML MDRD: 83 ML/MIN/{1.73_M2}
GLUCOSE SERPL-MCNC: 110 MG/DL (ref 70–99)
GLUCOSE SERPL-MCNC: 96 MG/DL (ref 70–99)
HCT VFR BLD AUTO: 33.5 % (ref 35–47)
HGB BLD-MCNC: 10.2 G/DL (ref 11.7–15.7)
INR PPP: 2.81 (ref 0.86–1.14)
MAGNESIUM SERPL-MCNC: 1.8 MG/DL (ref 1.6–2.3)
MAGNESIUM SERPL-MCNC: 2.1 MG/DL (ref 1.6–2.3)
MCH RBC QN AUTO: 29.2 PG (ref 26.5–33)
MCHC RBC AUTO-ENTMCNC: 30.4 G/DL (ref 31.5–36.5)
MCV RBC AUTO: 96 FL (ref 78–100)
PHOSPHATE SERPL-MCNC: 4.3 MG/DL (ref 2.5–4.5)
PLATELET # BLD AUTO: 135 10E9/L (ref 150–450)
POTASSIUM SERPL-SCNC: 3.8 MMOL/L (ref 3.4–5.3)
POTASSIUM SERPL-SCNC: 3.9 MMOL/L (ref 3.4–5.3)
PROT SERPL-MCNC: 6.9 G/DL (ref 6.8–8.8)
RBC # BLD AUTO: 3.49 10E12/L (ref 3.8–5.2)
SODIUM SERPL-SCNC: 135 MMOL/L (ref 133–144)
SODIUM SERPL-SCNC: 139 MMOL/L (ref 133–144)
WBC # BLD AUTO: 6.1 10E9/L (ref 4–11)

## 2019-10-03 PROCEDURE — 25000132 ZZH RX MED GY IP 250 OP 250 PS 637: Performed by: SURGERY

## 2019-10-03 PROCEDURE — 36415 COLL VENOUS BLD VENIPUNCTURE: CPT | Performed by: STUDENT IN AN ORGANIZED HEALTH CARE EDUCATION/TRAINING PROGRAM

## 2019-10-03 PROCEDURE — 84100 ASSAY OF PHOSPHORUS: CPT | Performed by: INTERNAL MEDICINE

## 2019-10-03 PROCEDURE — 40000141 ZZH STATISTIC PERIPHERAL IV START W/O US GUIDANCE

## 2019-10-03 PROCEDURE — 97110 THERAPEUTIC EXERCISES: CPT | Mod: GO

## 2019-10-03 PROCEDURE — 21400000 ZZH R&B CCU UMMC

## 2019-10-03 PROCEDURE — 36415 COLL VENOUS BLD VENIPUNCTURE: CPT | Performed by: INTERNAL MEDICINE

## 2019-10-03 PROCEDURE — 80053 COMPREHEN METABOLIC PANEL: CPT | Performed by: INTERNAL MEDICINE

## 2019-10-03 PROCEDURE — 99232 SBSQ HOSP IP/OBS MODERATE 35: CPT | Performed by: PHYSICIAN ASSISTANT

## 2019-10-03 PROCEDURE — 85610 PROTHROMBIN TIME: CPT | Performed by: INTERNAL MEDICINE

## 2019-10-03 PROCEDURE — 97116 GAIT TRAINING THERAPY: CPT | Mod: GP

## 2019-10-03 PROCEDURE — 97530 THERAPEUTIC ACTIVITIES: CPT | Mod: GP

## 2019-10-03 PROCEDURE — 25000132 ZZH RX MED GY IP 250 OP 250 PS 637: Performed by: PHYSICIAN ASSISTANT

## 2019-10-03 PROCEDURE — 80048 BASIC METABOLIC PNL TOTAL CA: CPT | Performed by: STUDENT IN AN ORGANIZED HEALTH CARE EDUCATION/TRAINING PROGRAM

## 2019-10-03 PROCEDURE — 25000128 H RX IP 250 OP 636: Performed by: PHYSICIAN ASSISTANT

## 2019-10-03 PROCEDURE — 97535 SELF CARE MNGMENT TRAINING: CPT | Mod: GO

## 2019-10-03 PROCEDURE — 83735 ASSAY OF MAGNESIUM: CPT | Performed by: INTERNAL MEDICINE

## 2019-10-03 PROCEDURE — G0463 HOSPITAL OUTPT CLINIC VISIT: HCPCS

## 2019-10-03 PROCEDURE — 85027 COMPLETE CBC AUTOMATED: CPT | Performed by: INTERNAL MEDICINE

## 2019-10-03 PROCEDURE — 25000132 ZZH RX MED GY IP 250 OP 250 PS 637: Performed by: STUDENT IN AN ORGANIZED HEALTH CARE EDUCATION/TRAINING PROGRAM

## 2019-10-03 PROCEDURE — 83735 ASSAY OF MAGNESIUM: CPT | Performed by: STUDENT IN AN ORGANIZED HEALTH CARE EDUCATION/TRAINING PROGRAM

## 2019-10-03 RX ADMIN — PANTOPRAZOLE SODIUM 40 MG: 40 TABLET, DELAYED RELEASE ORAL at 16:44

## 2019-10-03 RX ADMIN — LISINOPRIL 5 MG: 2.5 TABLET ORAL at 08:47

## 2019-10-03 RX ADMIN — THIAMINE HCL TAB 100 MG 100 MG: 100 TAB at 08:48

## 2019-10-03 RX ADMIN — MULTIPLE VITAMINS W/ MINERALS TAB 1 TABLET: TAB at 08:48

## 2019-10-03 RX ADMIN — PANTOPRAZOLE SODIUM 40 MG: 40 TABLET, DELAYED RELEASE ORAL at 08:47

## 2019-10-03 RX ADMIN — ATORVASTATIN CALCIUM 40 MG: 40 TABLET, FILM COATED ORAL at 19:31

## 2019-10-03 RX ADMIN — DULOXETINE HYDROCHLORIDE 60 MG: 30 CAPSULE, DELAYED RELEASE ORAL at 08:47

## 2019-10-03 RX ADMIN — ACETAMINOPHEN 650 MG: 325 TABLET, FILM COATED ORAL at 14:11

## 2019-10-03 RX ADMIN — DICLOFENAC 2 G: 10 GEL TOPICAL at 14:13

## 2019-10-03 RX ADMIN — POTASSIUM CHLORIDE 20 MEQ: 750 TABLET, EXTENDED RELEASE ORAL at 14:21

## 2019-10-03 RX ADMIN — FUROSEMIDE 40 MG: 10 INJECTION, SOLUTION INTRAVENOUS at 10:00

## 2019-10-03 RX ADMIN — FUROSEMIDE 40 MG: 10 INJECTION, SOLUTION INTRAVENOUS at 17:36

## 2019-10-03 ASSESSMENT — ACTIVITIES OF DAILY LIVING (ADL)
ADLS_ACUITY_SCORE: 12
ADLS_ACUITY_SCORE: 13
ADLS_ACUITY_SCORE: 13
ADLS_ACUITY_SCORE: 16

## 2019-10-03 ASSESSMENT — MIFFLIN-ST. JEOR: SCORE: 2289.78

## 2019-10-03 NOTE — PLAN OF CARE
Discharge Planner PT   Patient plan for discharge: Rehab  Current status: Improved tolerance to activity with 2L O2 donned. Amb 40' on RA and then 70' on 2L O2. O2 sats remained 90% or greater with activity on oxygen. Worked on sit<>stands and general standing activity.  Barriers to return to prior living situation: Deconditioning, SOB  Recommendations for discharge: ARC  Rationale for recommendations: Current level of function. Pt would tolerate 3 hours of activity/rehab daily.       Entered by: Toan Holguin 10/03/2019 3:47 PM

## 2019-10-03 NOTE — PROGRESS NOTES
I was asked to asses for thoracentesis. Patient does have a small to moderate sized right pleural effusion without loculations. Would be amenable to bedside evaluation. Would recommend holding warfarin again, recheck INR in AM. If INR remains over 2, we can consider FFP to allow for procedure or continue holding until Saturday AM. We will be in contact tomorrow AM. Thanks    Xiang Orlando MD

## 2019-10-03 NOTE — PLAN OF CARE
Pt s/p PEA arrest. Continues to diurese on lasix IV with purewick catheter in, some incontinence. States her respiratory status is better, continues on O2 1.5 to 2 l nc.  Still has congested cough with chronic bronchitis. To have thoracentesis soon, coumadin held today. Wound cares done, L groin with large bleeding open area, dressed with xeroform gauze. Nystatin powder in ally area and under breasts. SR 80s-100s with 0-4 PVCs.

## 2019-10-03 NOTE — PLAN OF CARE
0144-9048: VSS on 2L O2, NSR. A&Ox4. Sleeping comfortably between cares. Up Ax1, commode and walker. Voiding adequate amounts, no BM. Bilateral pannus wounds CDI, powder to groin. Tolerating 2gm Na diet. L) PIV SL. PT/OT/WOC consulted. Patient has large L) pleural effusion-plan for thoracentesis with IR once INR lowered. Coumadin held last evenng.   Will continue to monitor and follow POC.

## 2019-10-03 NOTE — CONSULTS
Interventional  Radiology consulted  for a left sided Thoracentesis. Pt has pleural effusion, possible necrotizing left lower lobe.  we recommended that they should still call CAPS team, if they are unable to try we can do it after patient is transitioned to heparin in view of high INR    Discussed with Dr. Everett Tai IR [facility]  [phone] Call pager  [phone] pager

## 2019-10-03 NOTE — PROGRESS NOTES
"WO Nurse Inpatient Wound Assessment   Reason for consultation: Evaluate and treat Pannus wound   New- 10/3- L) groin site    Assessment  Pannus wound due to Intertrigo  Status:Follow up assessment-Improving    Left Groin-ECMO Cannula site- staples were removed on 10/1- now dehisced    Treatment Plan  Pannus wound (right side and right groin): Daily    1. Cleanse with gentle soap and water  2. Apply Xeroform (#227192) to open wound only  3. Cover with ABD if needed for drainage      L) groin- Remove old dressing very gently.    Cleanse with Microklenz and pat dry    Cut a piece of polymem (#[id number]) to fit into the wound bed and place it. Ensure to cover entire wound bed.    Cover with 4x4 gauze or ABD depending upon drainage.    Change dressing every day.      Abdominal pannus only around intact skin-Cleanse the area and dry thoroughly daily.    Cut a piece of interdry (#[id number]) and place it as a single layer and date it.     Ensure to leave at least 2\" of tail beyond the fold to promote moisture wicking.    May leave same interdry for 5 days if not soiled.    DO not use this product with any other creams or powder.    Orders Reviewed  Recommended provider order: NA  WO Nurse follow-up plan:weekly  Nursing to notify the Provider(s) and re-consult the WO Nurse if wound(s) deteriorates or new skin concern.    Patient History  According to provider note(s):  Shira Rodriguez is a 59 F with a history of hypertension, hyperlipidemia, sleep apnea, morbid obesity, and chronic bronchitis who presented to [facility] with massive PE with following PEA arrest 9/9 and ROSC after intubation/resusitation. Transferred here for VA ECMO thrombectomy and catheter directed lytics on 9/9, went for VA ECMO decannulation on 9/17 complicated by hypercarbic respiratory failure s/p VV ECMO through RIJ-FV bypass s/p decannulation on 9/19. Repeat CT with no residual clot in PA. RV function improved to normal. She is being bridged to a " therapeutic INR and has minimal oxygen requirements at this stage.     Objective Data  Containment of urine/stool: Incontinence Protocol    Active Diet Order  Orders Placed This Encounter      2 Gram Sodium Diet      Output:   I/O last 3 completed shifts:  In: 660 [P.O.:360; I.V.:300]  Out: 1100 [Urine:1100]    Risk Assessment:   Sensory Perception: 4-->no impairment  Moisture: 3-->occasionally moist  Activity: 3-->walks occasionally  Mobility: 3-->slightly limited  Nutrition: 3-->adequate  Friction and Shear: 2-->potential problem  Ole Score: 18                          Labs:   Recent Labs   Lab 10/03/19  0604   ALBUMIN 2.8*   HGB 10.2*   INR 2.81*   WBC 6.1       Physical Exam  Skin inspection: focused Pannus    Wound Location:  Pannus             Right Pannus    Date of last photo 9/25/19  Wound History: Pt obese with excessive moisture/sweat in deep folds.   Measurements (length x width x depth, in cm)  Right pannus 0.2 cm x 11 cm  x  0.1 cm   Wound Base: 100 % pink moist tissue  Tunneling N/A  Undermining N/A  Palpation of the wound bed: normal   Periwound skin: intact  Color: normal and consistent with surrounding tissue  Temperature: normal   Drainage:, scant  Description of drainage: serous  Odor: none  Pain: denies , none     2. Left groin        Date of last photo 10/3/19  Wound History: Previous ECMO site. Staples were removed on 10/1 and now noted dehiscence   Measurements (length x width x depth, in cm) 6 cm x3 cm  x  0.5 cm   Wound Base: 70 % moist tan/yellow nonviable slough tissue and 30% pink moist tissue  Tunneling N/A  Undermining N/A  Palpation of the wound bed: normal   Periwound skin: intact but moist  Color: normal and consistent with surrounding tissue  Temperature: normal   Drainage:, small  Description of drainage: serosanguinous  Odor: none  Pain:minimal and tender with cleansing    Interventions  Current support surface: Bariatric Low air loss mattress  Current off-loading measures:  Pillows under calves  Visual inspection of wound(s) completed  Wound Care: done per plan of care  Supplies: ordered: polymem and xeroform  Education provided: wound progress  Discussed plan of care with Patient and RN    Sharon Keith RN MSN CWOCN

## 2019-10-03 NOTE — PROGRESS NOTES
D/I:  58 yo with hx of hypertension, hyperlipidemia, sleep apnea, morbid obesity, and chronic bronchitis who presents to [facility] ED via EMS for evaluation of respiratory distress. S/P PEA arrest and PE. Transferred to [facility] for ECMO placement.     Monitor shows SR 60-80s, up to 120s with activity. C/O shortness of breath while on RA, recovered when on 2L/NC. Left lower lung decreased with fine crackles. C/O pain from CPR-received tylenol with relief. Up to shower with heavy assist of 1. Very tired afterward, but able to assist as able. Up to chair and commode with SBA and walker. See flowsheets for assessments and additional data.   A: Stable post-cardiac arrest. Left lung effusion.  P: Assess and treat for pain as indicated. Monitor respiratory status. Probable lung tap tomorrow if INR <2. Continue current cares and notify providers with questions or concerns.

## 2019-10-03 NOTE — PROGRESS NOTES
[facility]   Cardiology Progress      Interval History: Patient states feels gradual improvement in shortness of breath. Denies nausea, vomiting, chest pain, SOB, cough, abdominal pain, dysuria, new LE pain/swelling. Left groin wound re-opened and packed without spreading erythema, fluctuance.     Physical Exam:  Temp:  [97.8  F (36.6  C)-98.6  F (37  C)] 98.6  F (37  C)  Pulse:  [88] 88  Heart Rate:  [84-92] 84  Resp:  [16-20] 16  BP: (104-128)/(53-71) 128/71  SpO2:  [94 %-96 %] 95 %    Intake/Output Summary (Last 24 hours) at 10/2/2019 1020  Gross per 24 hour   Intake 680 ml   Output 2965 ml   Net -2285 ml     GEN: NAD  Pulm: bibasilar rales.  No appreciable rhonchi  Cardiac: Distant heart sounds secondary to body habitus. JVP not appreciably elevated; body habitus limits this exam. No appreciable murmur.  No LE edema.  Left Groin: incision packed, bandage in place. No appreciable hematoma  GI: soft, non distended  Neuro:  Alert and oriented x4.    Medications:    atorvastatin  40 mg Oral QPM     DULoxetine  60 mg Oral Daily     furosemide  40 mg Intravenous BID     lisinopril  5 mg Oral Daily     miconazole   Topical BID     multivitamin w/minerals  1 tablet Oral or Feeding Tube Daily     pantoprazole  40 mg Oral BID AC     sodium chloride (PF)  3 mL Intracatheter Q8H     vitamin B1  100 mg Oral or Feeding Tube Daily       - MEDICATION INSTRUCTIONS -       - MEDICATION INSTRUCTIONS -       - MEDICATION INSTRUCTIONS -         Labs:   CMP  Recent Labs   Lab 10/03/19  0604 10/02/19  0545 10/01/19  0548 09/30/19  0625    140 142 142   POTASSIUM 3.8 3.8 3.7 4.0   CHLORIDE 105 106 109 109   CO2 25 26 25 25   ANIONGAP 9 9 8 8   GLC 96 94 94 100*   BUN 22 18 17 14   CR 0.79 0.76 0.72 0.64   GFRESTIMATED 83 86 >90 >90   GFRESTBLACK >90 >90 >90 >90   MARIA INES 9.1 9.0 9.4 9.2   MAG 2.1 2.2 1.9 2.2   PHOS 4.3 4.4 3.9 4.0   PROTTOTAL 6.9 7.0 6.8 7.1   ALBUMIN 2.8* 2.8* 2.8* 2.7*   BILITOTAL  0.4 0.3 0.3 0.3   ALKPHOS 67 66 66 64   AST 13 11 11 20   ALT 24 24 23 26     CBC  Recent Labs   Lab 10/03/19  0604 10/02/19  0545 10/01/19  0548 09/30/19  0625   WBC 6.1 6.8 6.7 6.8   RBC 3.49* 3.45* 3.32* 3.33*   HGB 10.2* 10.0* 9.8* 9.8*   HCT 33.5* 33.2* 32.1* 32.2*   MCV 96 96 97 97   MCH 29.2 29.0 29.5 29.4   MCHC 30.4* 30.1* 30.5* 30.4*   RDW 17.5* 17.8* 17.5* 17.3*   * 90* 71* 103*     INR  Recent Labs   Lab 10/03/19  0604 10/02/19  0545 10/01/19  0548 09/30/19  0625   INR 2.81* 2.70* 2.47* 2.19*     ASSESSMENT/PLAN:  Shira Rodriguez is a 59 F with a history of hypertension, hyperlipidemia, sleep apnea, morbid obesity, and chronic bronchitis who presented to [facility] 9/9 with massive PE with following PEA arrest and ROSC after intubation/resuscitation, transferred for [facility] ECMO.      Changes Today:  - continue 40mg IV lasix BID  - Holding warfarin for possible thoracentesis, INR 2.8   - Consulted procedure team left thoracentesis, diagnostic. Possible patient may need IR thoracentesis, due to habitus. INR today 2.8.   - ARU pending thoracentesis and transition to oral diuretics     # Acute Hypoxemic Respiratory Failure, improved  # Bilateral Plueral Effusions, small L>R  # Nectrotic Left Lower Lobe on prior imaging, infection versus atelectasis  # Volume Overload, likely 2/2 acute RV failure (resolved) and ECMO resuscitation   Patient treated for aspiration PNA earlier in course. CT 9/21 with possible necrotizing LLL infection versus complete atelectasis. Patient remains afebrile without leukocytosis and clinically improved since 9/21. Would like diagnostic tap, unsure if it would be therapeutic and increase her oxygenation. Her ongoing need for oxygen is likely moreso related to volume overload. Her estimated dry weight is unclear and she is a difficult exam. She still intermittently requiring some oxygen at rest; requiring 2-4L with activity and she has persistent rales in her lung bases. Presently  she is on 40mg IV lasix BID 10/01 with adequate urine response, hope to transition to oral soon. Portable CXR 10/01 showed pulmonary edema, albeit improved as compared to previous exams as well as a persistent L>R pleural effusion. Renal function remains stable.  - continue 40mg IV lasix twice daily  - possible thoracentesis for L>R pleural effusion.   - consulted pulmonology, appreciate their recs. Consulted procedure team CAPS for possible thora. INR 2.8. Holding warfarin. Unlikely to happen today but CAPS will evaluate if this is even possible via US guided technique due to habitus. May need to have IR thoracentesis.     # PEA arrest secondary to massive pulmonary embolism  # Hypoxic respiratory failure - improving  # Volume overload - improving  Transferred to [facility] for [facility] ECMO, and IR thrombectomy and catheter directed lytics on 9/9. Went for VA ECMO decannulation on 9/17 which was complicated by hypercarbic respiratory failure requiring placement of VV ECMO through RIJ-FV bypass. VV ECMO was decannulated on 9/19. The patient has been off sedation since 9/22, and was extubated 9/22. Repeat CT with no residual clot in PA on 09/21, but necrotic LLL as above. RV function improved to normal. INR theraputic as of 09/30/2019.  Please see above discussion re: fluid.  Left groin sutures removed 10/01, left groin wound re-opened and packed. WOC consult placed.  - Warfarin, goal INR 2-3. Pharmacy to dose, held for now  - Duonebs PRN; Acapella qid   - Wean oxygen as able  - WOC following groin wounds      # Sinus Pause  Noted overnight 09/29 while sleeping; length of 3.6 seconds.  Discussed with EP; no need for acute intervention. Will stop BB and plan to discharge the patient on monitor  - Ziopatch x14 days at discharge  - EP consult; appreciate formal recommendations     # Acute Thrombocytopenia  Thrombocytopenia 60-80k baseline 180k. Today, 90k. Had precipitous drop in platelets after admission - suspected due to  consumption. 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   - CBC daily     # Afib/Aflutter  Noted to be in atrial fibrillation while in the ICU and on dobutamine, which was thought to be exacerbating factor. Dobutamine discontinued 9/23 and patient was given amiodarone bolus at that time. She converted to sinus. On 9/25 she went into atrial flutter with variable block. Again was given an amio bolus, fluid bolus, and IV metop x1. Continues to intermittently go into a-fib - rates now controlled. Has been in sinus rhythm x24 hours.  Seen by EP 09/30 given sinus pause; see recommendations as above  - holding BB due to sinus pause  - warfarin as above, held for now for possible procedure     #COPD  # History of tobacco abuse  # Aspiration pneumonia - resolved  Sputum cultures:               - 9/11 MSSA: vancomycin/zosyn x5 days for ECMO, vanc last dose 9/15, cefazolin on 9/17-9/22               - 9/20 Serratia (ceftriaxone susceptible): Ceftriaxone 9/23-9/28   - s/p prednisone burst     # History of depression  Cymbalta held while in ICU. Will resume at lower dose over next few days, and increase to PTA dose. Additionally reports that she may have been on Wellbutrin, however, notes regarding this drug state she had increased crying episodes while on this medication and it appears to have been stopped.  - up titrated to home 60mg Cymbalta      # HTN   BP improved today  - start 5mg lisinopril today  - continue to hold PTA spironolactone     # HLD  - Atorvastatin 40mg     # CYRUS   Patient diagnosed with sleep apnea, but refuses to use BiPAP/CPAP at home due to mask discomfort.  - Oxygen at night PRN to keep O2 sats >90%.      # Rheumatoid Arthritis  - holding PTA methotrexate/Wellcovorin while inpatient          Anticipated Disposition: pending diuresis, evaluation of necrotic LLL    Patient discussed with Dr. Frey, who agrees with above plan.      Sharan Campuzano PA-C  [facility] CSI  Pager [phone]

## 2019-10-03 NOTE — PROGRESS NOTES
Social Work Services Progress Note     Hospital Day: 23  Date of Initial Social Work Evaluation:  9/27/19  Collaborated with: SNF admissions, CSI Team     Data: Pt is a 59 year old female being followed by SW for discharge planning to ARU once diuresis is completed.     Intervention:  SW received update from medical team that the pt is not ready for discharge until likely this weekend due to diuresis needs.  Will plan to follow for when ready to discharge.     - Referrals in Process:    [facility]  [phone]     Assessment: Did not meet with pt for this encounter note.     Plan:    Anticipated Disposition: Facility:  [facility] following for placement    Barriers to d/c plan: Diuresis needed, insurance auth needed    Follow Up: SW to follow for discharge planning.     ABHISHEK Christianson, ARRONW  6C Unit   Phone: [phone]  Pager: [phone]  Unit: [phone]

## 2019-10-03 NOTE — CONSULTS
Consult received, please see progress notes for discharge planning.    ABHISHEK Christianson, LCSW  6C Unit   Phone: [phone]  Pager: [phone]  Unit: [phone]

## 2019-10-03 NOTE — PLAN OF CARE
OT/6C:  Discharge Planner OT   Patient plan for discharge: Rehab.   Current status: Patient fatigued after busy day but agreeable to participate. Educated on strategies for LB dressing. Able to doff R sock IND with effort, but needing Mod A to don. Instructed in UE calisthenics 4x1 minute and 3x30 second standing marching bouts. SANTOS noted with patient requiring moderate rest breaks to recover. O2 VSS on 2L O2. HR briefly reading up to 120's, but unsure of accuracy, as noisy signal noted.   Barriers to return to prior living situation: Medical Status, Activity Tolerance, Weakness, Level of A for ADL  Recommendations for discharge: ARU  Rationale for recommendations: Patient would benefit from ongoing therapies to increase ADL independence and activity tolerance. Is motivated to participate and anticipate would be able to tolerate 3 hours of therapies per day.        Entered by: Mari Howard 10/03/2019 5:36 PM

## 2019-10-04 ENCOUNTER — APPOINTMENT (OUTPATIENT)
Dept: GENERAL RADIOLOGY | Facility: CLINIC | Age: 59
DRG: 003 | End: 2019-10-04
Attending: PHYSICIAN ASSISTANT
Payer: COMMERCIAL

## 2019-10-04 LAB
ALBUMIN FLD-MCNC: 2 G/DL
ALBUMIN SERPL-MCNC: 3 G/DL (ref 3.4–5)
ALP SERPL-CCNC: 75 U/L (ref 40–150)
ALT SERPL W P-5'-P-CCNC: 22 U/L (ref 0–50)
ANION GAP SERPL CALCULATED.3IONS-SCNC: 6 MMOL/L (ref 3–14)
APPEARANCE FLD: NORMAL
AST SERPL W P-5'-P-CCNC: 9 U/L (ref 0–45)
BASOPHILS NFR FLD MANUAL: 1 %
BILIRUB SERPL-MCNC: 0.3 MG/DL (ref 0.2–1.3)
BLD PROD TYP BPU: NORMAL
BLD PROD TYP BPU: NORMAL
BLD UNIT ID BPU: 0
BLOOD PRODUCT CODE: NORMAL
BPU ID: NORMAL
BUN SERPL-MCNC: 29 MG/DL (ref 7–30)
CALCIUM SERPL-MCNC: 9.2 MG/DL (ref 8.5–10.1)
CHLORIDE SERPL-SCNC: 105 MMOL/L (ref 94–109)
CO2 SERPL-SCNC: 26 MMOL/L (ref 20–32)
COLOR FLD: NORMAL
CREAT SERPL-MCNC: 0.82 MG/DL (ref 0.52–1.04)
CREAT SERPL-MCNC: 0.93 MG/DL (ref 0.52–1.04)
ERYTHROCYTE [DISTWIDTH] IN BLOOD BY AUTOMATED COUNT: 17.3 % (ref 10–15)
GFR SERPL CREATININE-BSD FRML MDRD: 68 ML/MIN/{1.73_M2}
GFR SERPL CREATININE-BSD FRML MDRD: 79 ML/MIN/{1.73_M2}
GLUCOSE FLD-MCNC: <1 MG/DL
GLUCOSE SERPL-MCNC: 97 MG/DL (ref 70–99)
GRAM STN SPEC: NORMAL
GRAM STN SPEC: NORMAL
HCT VFR BLD AUTO: 35.1 % (ref 35–47)
HGB BLD-MCNC: 10.1 G/DL (ref 11.7–15.7)
HGB BLD-MCNC: 10.7 G/DL (ref 11.7–15.7)
HGB BLD-MCNC: 10.8 G/DL (ref 11.7–15.7)
INR PPP: 1.91 (ref 0.86–1.14)
INR PPP: 2.32 (ref 0.86–1.14)
LDH FLD L TO P-CCNC: 2788 U/L
LYMPHOCYTES NFR FLD MANUAL: 1 %
MAGNESIUM SERPL-MCNC: 2 MG/DL (ref 1.6–2.3)
MCH RBC QN AUTO: 29.5 PG (ref 26.5–33)
MCHC RBC AUTO-ENTMCNC: 30.5 G/DL (ref 31.5–36.5)
MCV RBC AUTO: 97 FL (ref 78–100)
MONOS+MACROS NFR FLD MANUAL: 37 %
NEUTS BAND NFR FLD MANUAL: 61 %
NUM BPU REQUESTED: 1
PHOSPHATE SERPL-MCNC: 4.2 MG/DL (ref 2.5–4.5)
PLATELET # BLD AUTO: 164 10E9/L (ref 150–450)
POTASSIUM SERPL-SCNC: 4.2 MMOL/L (ref 3.4–5.3)
PROT FLD-MCNC: 4.6 G/DL
PROT SERPL-MCNC: 7.3 G/DL (ref 6.8–8.8)
RBC # BLD AUTO: 3.63 10E12/L (ref 3.8–5.2)
SODIUM SERPL-SCNC: 137 MMOL/L (ref 133–144)
SPECIMEN SOURCE FLD: NORMAL
SPECIMEN SOURCE: NORMAL
TRANSFUSION STATUS PATIENT QL: NORMAL
TRANSFUSION STATUS PATIENT QL: NORMAL
WBC # BLD AUTO: 6.4 10E9/L (ref 4–11)
WBC # FLD AUTO: 968 /UL

## 2019-10-04 PROCEDURE — 83615 LACTATE (LD) (LDH) ENZYME: CPT | Performed by: PHYSICIAN ASSISTANT

## 2019-10-04 PROCEDURE — 25000132 ZZH RX MED GY IP 250 OP 250 PS 637: Performed by: PHYSICIAN ASSISTANT

## 2019-10-04 PROCEDURE — 82042 OTHER SOURCE ALBUMIN QUAN EA: CPT | Performed by: PHYSICIAN ASSISTANT

## 2019-10-04 PROCEDURE — 85018 HEMOGLOBIN: CPT | Performed by: ORTHOPAEDIC SURGERY

## 2019-10-04 PROCEDURE — 0W9B3ZZ DRAINAGE OF LEFT PLEURAL CAVITY, PERCUTANEOUS APPROACH: ICD-10-PCS | Performed by: ORTHOPAEDIC SURGERY

## 2019-10-04 PROCEDURE — 71045 X-RAY EXAM CHEST 1 VIEW: CPT

## 2019-10-04 PROCEDURE — 32555 ASPIRATE PLEURA W/ IMAGING: CPT | Performed by: ORTHOPAEDIC SURGERY

## 2019-10-04 PROCEDURE — 88112 CYTOPATH CELL ENHANCE TECH: CPT | Performed by: PHYSICIAN ASSISTANT

## 2019-10-04 PROCEDURE — 85610 PROTHROMBIN TIME: CPT | Performed by: PHYSICIAN ASSISTANT

## 2019-10-04 PROCEDURE — 25000132 ZZH RX MED GY IP 250 OP 250 PS 637: Performed by: STUDENT IN AN ORGANIZED HEALTH CARE EDUCATION/TRAINING PROGRAM

## 2019-10-04 PROCEDURE — P9059 PLASMA, FRZ BETWEEN 8-24HOUR: HCPCS | Performed by: PHYSICIAN ASSISTANT

## 2019-10-04 PROCEDURE — 36415 COLL VENOUS BLD VENIPUNCTURE: CPT | Performed by: ORTHOPAEDIC SURGERY

## 2019-10-04 PROCEDURE — 36415 COLL VENOUS BLD VENIPUNCTURE: CPT | Performed by: INTERNAL MEDICINE

## 2019-10-04 PROCEDURE — 85018 HEMOGLOBIN: CPT | Performed by: PHYSICIAN ASSISTANT

## 2019-10-04 PROCEDURE — 40000986 XR CHEST PORT 1 VW

## 2019-10-04 PROCEDURE — 82945 GLUCOSE OTHER FLUID: CPT | Performed by: PHYSICIAN ASSISTANT

## 2019-10-04 PROCEDURE — 84100 ASSAY OF PHOSPHORUS: CPT | Performed by: INTERNAL MEDICINE

## 2019-10-04 PROCEDURE — 84157 ASSAY OF PROTEIN OTHER: CPT | Performed by: PHYSICIAN ASSISTANT

## 2019-10-04 PROCEDURE — 80053 COMPREHEN METABOLIC PANEL: CPT | Performed by: INTERNAL MEDICINE

## 2019-10-04 PROCEDURE — 85610 PROTHROMBIN TIME: CPT | Performed by: INTERNAL MEDICINE

## 2019-10-04 PROCEDURE — 89051 BODY FLUID CELL COUNT: CPT | Performed by: PHYSICIAN ASSISTANT

## 2019-10-04 PROCEDURE — 36415 COLL VENOUS BLD VENIPUNCTURE: CPT | Performed by: PHYSICIAN ASSISTANT

## 2019-10-04 PROCEDURE — 83735 ASSAY OF MAGNESIUM: CPT | Performed by: INTERNAL MEDICINE

## 2019-10-04 PROCEDURE — 87070 CULTURE OTHR SPECIMN AEROBIC: CPT | Performed by: PHYSICIAN ASSISTANT

## 2019-10-04 PROCEDURE — 25000132 ZZH RX MED GY IP 250 OP 250 PS 637: Performed by: INTERNAL MEDICINE

## 2019-10-04 PROCEDURE — 87205 SMEAR GRAM STAIN: CPT | Performed by: PHYSICIAN ASSISTANT

## 2019-10-04 PROCEDURE — 88305 TISSUE EXAM BY PATHOLOGIST: CPT | Performed by: PHYSICIAN ASSISTANT

## 2019-10-04 PROCEDURE — 88312 SPECIAL STAINS GROUP 1: CPT | Performed by: PHYSICIAN ASSISTANT

## 2019-10-04 PROCEDURE — 85027 COMPLETE CBC AUTOMATED: CPT | Performed by: INTERNAL MEDICINE

## 2019-10-04 PROCEDURE — 99233 SBSQ HOSP IP/OBS HIGH 50: CPT | Performed by: INTERNAL MEDICINE

## 2019-10-04 PROCEDURE — 25000132 ZZH RX MED GY IP 250 OP 250 PS 637: Performed by: SURGERY

## 2019-10-04 PROCEDURE — 00000102 ZZHCL STATISTIC CYTO WRIGHT STAIN TC: Performed by: PHYSICIAN ASSISTANT

## 2019-10-04 PROCEDURE — 00000155 ZZHCL STATISTIC H-CELL BLOCK W/STAIN: Performed by: PHYSICIAN ASSISTANT

## 2019-10-04 PROCEDURE — 82565 ASSAY OF CREATININE: CPT | Performed by: PHYSICIAN ASSISTANT

## 2019-10-04 PROCEDURE — 25000128 H RX IP 250 OP 636: Performed by: STUDENT IN AN ORGANIZED HEALTH CARE EDUCATION/TRAINING PROGRAM

## 2019-10-04 PROCEDURE — 21400000 ZZH R&B CCU UMMC

## 2019-10-04 PROCEDURE — 40000344 ZZHCL STATISTIC THAWING COMPONENT: Performed by: PHYSICIAN ASSISTANT

## 2019-10-04 RX ORDER — HEPARIN SODIUM 10000 [USP'U]/100ML
0-3500 INJECTION, SOLUTION INTRAVENOUS CONTINUOUS
Status: DISCONTINUED | OUTPATIENT
Start: 2019-10-04 | End: 2019-10-06

## 2019-10-04 RX ORDER — FUROSEMIDE 10 MG/ML
60 INJECTION INTRAMUSCULAR; INTRAVENOUS DAILY
Status: DISCONTINUED | OUTPATIENT
Start: 2019-10-04 | End: 2019-10-04

## 2019-10-04 RX ORDER — WARFARIN SODIUM 7.5 MG/1
7.5 TABLET ORAL ONCE
Status: COMPLETED | OUTPATIENT
Start: 2019-10-04 | End: 2019-10-04

## 2019-10-04 RX ORDER — FUROSEMIDE 20 MG
80 TABLET ORAL DAILY
Status: DISCONTINUED | OUTPATIENT
Start: 2019-10-04 | End: 2019-10-07 | Stop reason: HOSPADM

## 2019-10-04 RX ADMIN — WARFARIN SODIUM 7.5 MG: 7.5 TABLET ORAL at 23:17

## 2019-10-04 RX ADMIN — ATORVASTATIN CALCIUM 40 MG: 40 TABLET, FILM COATED ORAL at 20:43

## 2019-10-04 RX ADMIN — ACETAMINOPHEN 650 MG: 325 TABLET, FILM COATED ORAL at 12:00

## 2019-10-04 RX ADMIN — LISINOPRIL 5 MG: 2.5 TABLET ORAL at 08:20

## 2019-10-04 RX ADMIN — DULOXETINE HYDROCHLORIDE 60 MG: 30 CAPSULE, DELAYED RELEASE ORAL at 08:20

## 2019-10-04 RX ADMIN — ACETAMINOPHEN 650 MG: 325 TABLET, FILM COATED ORAL at 20:44

## 2019-10-04 RX ADMIN — PANTOPRAZOLE SODIUM 40 MG: 40 TABLET, DELAYED RELEASE ORAL at 17:06

## 2019-10-04 RX ADMIN — PANTOPRAZOLE SODIUM 40 MG: 40 TABLET, DELAYED RELEASE ORAL at 08:19

## 2019-10-04 RX ADMIN — THIAMINE HCL TAB 100 MG 100 MG: 100 TAB at 08:19

## 2019-10-04 RX ADMIN — FUROSEMIDE 80 MG: 20 TABLET ORAL at 08:20

## 2019-10-04 RX ADMIN — MULTIPLE VITAMINS W/ MINERALS TAB 1 TABLET: TAB at 08:19

## 2019-10-04 RX ADMIN — HEPARIN SODIUM 1200 UNITS/HR: 10000 INJECTION, SOLUTION INTRAVENOUS at 22:45

## 2019-10-04 ASSESSMENT — ACTIVITIES OF DAILY LIVING (ADL)
ADLS_ACUITY_SCORE: 12
ADLS_ACUITY_SCORE: 13
ADLS_ACUITY_SCORE: 12
ADLS_ACUITY_SCORE: 13
ADLS_ACUITY_SCORE: 12
ADLS_ACUITY_SCORE: 13

## 2019-10-04 ASSESSMENT — MIFFLIN-ST. JEOR: SCORE: 2312

## 2019-10-04 NOTE — PLAN OF CARE
D. Pt was transferred from [facility] ED for evaluation of respiratory distress.  S/p PEA arrest and PE.  Pt is stable. C/o mild chest/CPR site pain.  No pain med is required. Pt stayed on RA most of the shift. SR/ST. Pt had a run of SVT/see provider notification note.  Checking BP on lower arm.  SBP=. MAP=70's. Good appeite. Pt made 350cc UO following pm dose of Lasix. Creat=1.05/it was 0.79 this am. Kept a strict I and O's. L grion site care done per POC. Bilateral groin site drsg CDI. Pt spent the evening on her recliner and transferred herlsef to the commode with walker and SBA.  Pt tolerating activity with mild SANTOS.   P. Continue to monitor. Possible left thoracentesis tomorrow if INR <2.

## 2019-10-04 NOTE — PROGRESS NOTES
[facility]   Cardiology Progress      Interval History: Patient states feels gradual improvement in shortness of breath. Denies nausea, vomiting, chest pain, SOB, cough, abdominal pain, dysuria, new LE pain/swelling. Slight bump in creatinine, will transition to oral diuretics.     Plan:   -Transition to oral diuretics Lasix 80 mg every day, to titrate based on I/O   -Repeat CXR today for ongoing oxygen demands   -Possible thoracentesis of left pleural effusion today pending INR. Will give 1 unit FFP.     Physical Exam:  Temp:  [97.6  F (36.4  C)-98.4  F (36.9  C)] 97.7  F (36.5  C)  Pulse:  [80-96] 88  Heart Rate:  [] 87  Resp:  [18] 18  BP: ()/(47-73) 121/67  SpO2:  [90 %-97 %] 95 %    Intake/Output Summary (Last 24 hours) at 10/2/2019 1020  Gross per 24 hour   Intake 680 ml   Output 2965 ml   Net -2285 ml     GEN: NAD, obese female lying in bed   Pulm: Difficult exam, diminished left lower lung sounds. ?Rales bilaterally. No appreciable rhonchi  Cardiac: Distant heart sounds secondary to body habitus. JVP not appreciably elevated; body habitus limits this exam. No appreciable murmur.  No LE edema.  Left Groin: incision packed, bandage in place. No appreciable hematoma, fluctuance, spreading erythema.  GI: soft, obese, non distended  Neuro:  Alert and oriented x4.    Medications:    atorvastatin  40 mg Oral QPM     DULoxetine  60 mg Oral Daily     furosemide  80 mg Oral Daily     lisinopril  5 mg Oral Daily     miconazole   Topical BID     multivitamin w/minerals  1 tablet Oral or Feeding Tube Daily     pantoprazole  40 mg Oral BID AC     sodium chloride (PF)  3 mL Intracatheter Q8H     vitamin B1  100 mg Oral or Feeding Tube Daily       - MEDICATION INSTRUCTIONS -       - MEDICATION INSTRUCTIONS -         Labs:   CMP  Recent Labs   Lab 10/03/19  2049 10/03/19  0604 10/02/19  0545 10/01/19  0548 09/30/19  0625    139 140 142 142   POTASSIUM 3.9 3.8 3.8 3.7 4.0    CHLORIDE 104 105 106 109 109   CO2 24 25 26 25 25   ANIONGAP 8 9 9 8 8   * 96 94 94 100*   BUN 28 22 18 17 14   CR 1.05* 0.79 0.76 0.72 0.64   GFRESTIMATED 58* 83 86 >90 >90   GFRESTBLACK 68 >90 >90 >90 >90   MARIA INES 8.9 9.1 9.0 9.4 9.2   MAG 1.8 2.1 2.2 1.9 2.2   PHOS  --  4.3 4.4 3.9 4.0   PROTTOTAL  --  6.9 7.0 6.8 7.1   ALBUMIN  --  2.8* 2.8* 2.8* 2.7*   BILITOTAL  --  0.4 0.3 0.3 0.3   ALKPHOS  --  67 66 66 64   AST  --  13 11 11 20   ALT  --  24 24 23 26     CBC  Recent Labs   Lab 10/04/19  0704 10/03/19  0604 10/02/19  0545 10/01/19  0548   WBC 6.4 6.1 6.8 6.7   RBC 3.63* 3.49* 3.45* 3.32*   HGB 10.7* 10.2* 10.0* 9.8*   HCT 35.1 33.5* 33.2* 32.1*   MCV 97 96 96 97   MCH 29.5 29.2 29.0 29.5   MCHC 30.5* 30.4* 30.1* 30.5*   RDW 17.3* 17.5* 17.8* 17.5*    135* 90* 71*     INR  Recent Labs   Lab 10/03/19  0604 10/02/19  0545 10/01/19  0548 09/30/19  0625   INR 2.81* 2.70* 2.47* 2.19*     ASSESSMENT/PLAN:  Shira Rodriguez is a 59 F with a history of hypertension, hyperlipidemia, sleep apnea, morbid obesity, and chronic bronchitis who presented to [facility] 9/9 with massive PE with subsequent PEA arrest and ROSC after intubation/resuscitation, transferred for VA ECMO.     Active issues:   Left panus wound dehisence, improving   Fluid overload, improving   Left lower lobe pleural effusion/?necrotizing infection? Without fever or leukocytosis     Changes Today:  - Transition to oral Lasix 80 mg daily and monitor UOP   - Repeat creat this afternoon as slight bump, likely 2/2 ongoing IV diuresis.   - Holding warfarin for possible thoracentesis  - ARU pending thoracentesis and transition to oral diuretics, hopeful for this weekend     # Acute Hypoxemic Respiratory Failure, improved  # Bilateral Plueral Effusions, small L>R  # Nectrotic Left Lower Lobe on prior imaging, infection versus atelectasis  # Volume Overload, likely 2/2 acute RV failure (resolved) and ECMO resuscitation   Patient treated for  aspiration PNA earlier in course. CT 9/21 with possible necrotizing LLL infection versus complete atelectasis. Patient remains afebrile without leukocytosis and clinically improved since 9/21. Would like diagnostic tap, unsure if it would be therapeutic and increase her oxygenation. Her ongoing need for oxygen is likely moreso related to volume overload. Her estimated dry weight is unclear and she is a difficult exam. She still intermittently requiring some oxygen at rest; requiring 2-4L with activity and she has persistent rales in her lung bases. This is likely mutli-factorial and due to her pulmonary edema, her effusion, her obesity, and significant deconditioning. Portable CXR 10/01 showed pulmonary edema, albeit improved as compared to previous exams as well as a persistent L>R pleural effusion. She remained on IV diuresis though 10/4 had slight bump in creat. Thus transitioned to oral lasix, will watch I/O closely.   - Lasix 80 mg daily   - possible thoracentesis for L>R pleural effusion and ?necrotizing infection without leukocytosis, fever, and improving oxygenation.   - consulted pulmonology, appreciate their recs. Consulted procedure team CAPS for diagnostic thora, feel this can be done US guided. Holding warfarin, goal INR <2.      # PEA arrest secondary to massive pulmonary embolism  # Hypoxic respiratory failure - improving  # Volume overload - improving  Transferred to [facility] for VA ECMO, and IR thrombectomy and catheter directed lytics on 9/9. Went for VA ECMO decannulation on 9/17 which was complicated by hypercarbic respiratory failure requiring placement of VV ECMO through RIJ-FV bypass. VV ECMO was decannulated on 9/19. The patient has been off sedation since 9/22, and was extubated 9/22. Repeat CT with no residual clot in PA on 09/21, but necrotic LLL as above. RV function improved to normal. INR theraputic as of 09/30/2019.  Please see above discussion re: fluid.  Left groin sutures removed 10/01,  "left groin wound re-opened and packed. WOC consult placed.  - Warfarin, goal INR 2-3. Pharmacy to dose, held for now  - Duonebs PRN; Acapella qid   - Wean oxygen as able    #Pannus wound (right side and right groin)  Patient with dehisced left groin cannula wound, staples removed 10/1. WOC following, appreciate their recs:   Daily    1. Cleanse with gentle soap and water  2. Apply Xeroform (#527851) to open wound only  3. Cover with ABD if needed for drainage       L) groin- Remove old dressing very gently.    Cleanse with Microklenz and pat dry    Cut a piece of polymem (#[id number]) to fit into the wound bed and place it. Ensure to cover entire wound bed.    Cover with 4x4 gauze or ABD depending upon drainage.    Change dressing every day.       Abdominal pannus only around intact skin-Cleanse the area and dry thoroughly daily.    Cut a piece of interdry (#[id number]) and place it as a single layer and date it.     Ensure to leave at least 2\" of tail beyond the fold to promote moisture wicking.    May leave same interdry for 5 days if not soiled.  DO not use this product with any other creams or powder.     # Sinus Pause  Noted overnight 09/29 while sleeping; length of 3.6 seconds.  Discussed with EP; no need for acute intervention. Will stop BB and plan to discharge the patient on monitor  - Ziopatch x14 days at discharge  - EP consult; appreciate formal recommendations     # Acute Thrombocytopenia  Thrombocytopenia 60-80k baseline 180k. Today, 90k. Had precipitous drop in platelets after admission - suspected due to consumption. 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   - CBC daily     # Afib/Aflutter  Noted to be in atrial fibrillation while in the ICU and on dobutamine, which was thought to be exacerbating factor. Dobutamine discontinued 9/23 and patient was given amiodarone bolus at that time. She converted to sinus. On 9/25 she went into atrial flutter with variable block. Again was given an amio bolus, " fluid bolus, and IV metop x1. Continues to intermittently go into a-fib - rates now controlled. Has been in sinus rhythm x24 hours.  Seen by EP 09/30 given sinus pause; see recommendations as above  - holding BB due to sinus pause  - warfarin as above, held for now for possible procedure     #COPD  # History of tobacco abuse  # Aspiration pneumonia - resolved  Sputum cultures:               - 9/11 MSSA: vancomycin/zosyn x5 days for ECMO, vanc last dose 9/15, cefazolin on 9/17-9/22               - 9/20 Serratia (ceftriaxone susceptible): Ceftriaxone 9/23-9/28   - s/p prednisone burst     # History of depression  Cymbalta held while in ICU. Will resume at lower dose over next few days, and increase to PTA dose. Additionally reports that she may have been on Wellbutrin, however, notes regarding this drug state she had increased crying episodes while on this medication and it appears to have been stopped.  - up titrated to home 60mg Cymbalta      # HTN   BP improved today  - start 5mg lisinopril today  - continue to hold PTA spironolactone     # HLD  - Atorvastatin 40mg     # CYRUS   Patient diagnosed with sleep apnea, but refuses to use BiPAP/CPAP at home due to mask discomfort.  - Oxygen at night PRN to keep O2 sats >90%.      # Rheumatoid Arthritis  - holding PTA methotrexate/Wellcovorin while inpatient          Barriers to transition to ARU:   -Monitor on oral diuretics for 24 hours   -LLL thoracentesis (diagnostic, not therapeutic)     Patient discussed with Dr. Frey, who agrees with above plan.      Sharan Campuzano PA-C  [facility] CSI  Pager [phone]           ATTENDING NOTE:  Patient has been seen and evaluated by me on 10/4/2019. I have reviewed the documentation above.  I have reviewed today's vital signs, medications, labs, and imaging results.  I have reviewed and edited, as necessary, the history, review of systems, physical examination, and assessment and plan.  I have discussed my assessment and plan with  the physician assistant.  I have seen and examined the patient today as part of a shared visit with Filiberto Campuzano PA-C.  Shira Rodriguez is a 59 year old female with risk factor profile (+) HTN, (-) DM, (+) hypercholesterolemia, (-) tobacco use, (-) fam Hx premature CAD, presented to [facility] on 9/9/19 with PEA arrest secondary to acute PE.  She was resuscitated at [facility] and transferred to [facility] for ECMO and pulmonary thrombectomy and catheter-directed thrombolytics.  She was on V-A ECMO until 9/17/19.  She had hypercarbic respiratory failure and she was converted to V-V ECMO until 9/19/19.  She was extubated on 9/22/19 and subsequently transferred to telemetry.  She was treated with IV Vanco and IV Zosyn for MSSA and then switched to Ceftriaxone for Serratia in sputum.  She developed A Fib / A Flutter (had occurred earlier in hospitalization) and was given Amiodarone.   She has required ongoing IV diuretics for respiratory failure (pulmonary edema).  She has lost 30 lbs over the past several weeks.   Exam documented above.  Cr 1.05.  CXR shows persistent LLL pleural effusion, plan for thoracentesis.   Appreciate pulmonary consultation.  She has been on coumadin and will be given FFP prophylactically.  Potential discharge this weekend after transitioning her to oral Lasix 80 mg today.       Brayan Main MD     Cardiovascular Division

## 2019-10-04 NOTE — PROVIDER NOTIFICATION
Notified [employer] Dr. Schumacher about pt's asymptomatic SVT.   MD ordered to check BMP and Mag. Pt made only 250cc UO this shift so far following Lasix 40mg IV.   K=3.9, Mag=1.8, and Creat=1.05. MD ordered NOT to replace those lytes regardless of PRN electrolytes replacement.

## 2019-10-04 NOTE — PROGRESS NOTES
Social Work Services Progress Note     Hospital Day: 25  Date of Initial Social Work Evaluation:  9/27/19  Collaborated with: SNF admissions, CSI Team     Data: Pt is a 59 year old female being followed by SW for discharge planning to ARU once diuresis is completed.     Intervention:  Pt continues to have diuresis and will be transitioned to orals.  Plan to follow medically through the weekend for when pt is ready for ARU.  Will ask weekend SW team to assist with discharge if ready on the weekend.     - Referrals in Process:    [facility]  [phone]     Assessment: Did not meet with pt for this encounter note, pt was with providers or not available when SW attempted to meet.     Plan:    Anticipated Disposition: Facility:  [facility] following for placement    Barriers to d/c plan: Diuresis needed, insurance auth needed    Follow Up: SW to follow for discharge planning.     ABHISHEK Christianson, LCSW  6C Unit   Phone: [phone]  Pager: [phone]  Unit: [phone]

## 2019-10-04 NOTE — PROCEDURES
Procedure Note    Attending: Xiang Orlando MD  Procedure: Left Thoracentesis  Indication: Therapeutic and diagnostic   Risk Assessment: Moderate. FFP x 1 with INR 1.9  Pre-procedure diagnosis: pleural effusion  Post-procedure diagnosis: Pleural effusion    The risks and benefits of the procedure were explained to Shira who expressed understanding and opted to proceed.  Consent was obtained and placed in the chart and the patient was placed on pulse oximetry.  A time out was performed.    An ultrasound probe was used to identify an area of pleural fluid in the LEFT hemithorax. Both the interspace was marked indicating the superior edge of the inferior rib and the inferior edge of the superior rib  This area was prepped and draped in the usual sterile fashion.  10 ml of 1% lidocaine was instilled and fluid located using ultrasound guidance.  A small incision was made with an 11 blade.  The thoracentesis catheter and needle were inserted above the rib (using skin markings but was never hit with the needle) until fluid was obtained. The initial fluid was darkly hemorrhagic, which based on her clinical scenario with chest compressions etc was not clincally unexpected. I then withdrew the needle and the character of the fluid changed substantially to a slightly rust colored but otherwise transparent fluid.   Using a one-way valve, 550 ml was removed. A specimen was sent for analysis. The catheter was removed with the patient exhaling and an occlusive dressing placed.       Ultrasound revealed normal lung sliding after the procedure and I did note a small amount of pleural fluid remaining.  I additionally confirmed my site of entry and it seems highly unlikely based on markings, that I entered along the inferior edge of the rib. However, I do think that I nicked an intercostal vessel, and based on appearance, venous source is favored. Findings were discussed with the patient as well as the primary service.  A  chest radiograph is pending. I would recommend delaying resumption of anticoagulation for as long as clinically feasible and at least until hemoglobin has been stable for at least 4-6 hours. Following serial chest radiographs would also be prudent. If she is developing anemia and increasing pleural effusion, would need to consider temp IVC filter and full reversal of anticoagulation. Discussed with IR who felt that CT should be considered as well at the earliest concern for ongoing bleeding with contrast enhancement at arterial and venous phases (multiphase CT)    The tolerated the procedure well. CAPS service will follow-up on findings from hgb and chest xray.       Xiang Orlando MD    DOS:  10/4/2019

## 2019-10-04 NOTE — PROGRESS NOTES
[id number]:  Reason for admission: PE arrest s/p thrombectomy and ECMO.     Neuro: A&Ox4. Calls appropriately.   Activity: Out of bed with SBA and walker.   Vitals: VSS on 1.5L NC.   LDAS: R PIV SL.   Cardiac: NS, 80s.    Respiratory: Hx of sleep apnea- placed 1.5L NC to keep sats >92%.     GI/: Voiding spontaneously with adequate output.  BM yesterday, No BM overnight.   Skin: Groin dressings, CDI. Interdry between skin layers.   Pain: C/o mild discomfort from CPR but declines intervention.    Diet: Regular.   Plan: Possible L thoracentesis if INR <2. Continue to monitor and follow POC.

## 2019-10-04 NOTE — PROGRESS NOTES
D/I:  60 yo with hx of hypertension, hyperlipidemia, sleep apnea, morbid obesity, and chronic bronchitis who presents to [facility] ED via EMS for evaluation of respiratory distress. S/P PEA arrest and PE. Transferred to [facility] for ECMO placement.      Monitor shows SR 60-80s, up to 120s with activity. C/O shortness of breath while on RA, recovered after a few minutes. Wants to avoid O2 if possible. C/O pain from CPR-received tylenol with relief. Up to commode and then to bathroom with walker and standby assist. INR 2.32-received 1 unit FFP-recheck INR 1.91. Starting left thoracentesis after. See flowsheets for assessments and additional data.   A: Stable post-cardiac arrest. Left lung effusion.  P: Assess and treat for pain as indicated. Monitor respiratory status. Left thoracentesis this afternoon. Plan to restart heparin 1 hour after and coumadin this evening. Continue current cares and notify providers with questions or concerns.

## 2019-10-05 ENCOUNTER — APPOINTMENT (OUTPATIENT)
Dept: PHYSICAL THERAPY | Facility: CLINIC | Age: 59
DRG: 003 | End: 2019-10-05
Payer: COMMERCIAL

## 2019-10-05 ENCOUNTER — APPOINTMENT (OUTPATIENT)
Dept: GENERAL RADIOLOGY | Facility: CLINIC | Age: 59
DRG: 003 | End: 2019-10-05
Attending: PHYSICIAN ASSISTANT
Payer: COMMERCIAL

## 2019-10-05 LAB
ALBUMIN SERPL-MCNC: 2.8 G/DL (ref 3.4–5)
ALP SERPL-CCNC: 65 U/L (ref 40–150)
ALT SERPL W P-5'-P-CCNC: 18 U/L (ref 0–50)
ANION GAP SERPL CALCULATED.3IONS-SCNC: 8 MMOL/L (ref 3–14)
AST SERPL W P-5'-P-CCNC: 12 U/L (ref 0–45)
BILIRUB SERPL-MCNC: 0.4 MG/DL (ref 0.2–1.3)
BUN SERPL-MCNC: 28 MG/DL (ref 7–30)
CALCIUM SERPL-MCNC: 9 MG/DL (ref 8.5–10.1)
CHLORIDE SERPL-SCNC: 106 MMOL/L (ref 94–109)
CO2 SERPL-SCNC: 25 MMOL/L (ref 20–32)
CREAT SERPL-MCNC: 0.86 MG/DL (ref 0.52–1.04)
ERYTHROCYTE [DISTWIDTH] IN BLOOD BY AUTOMATED COUNT: 17.1 % (ref 10–15)
ERYTHROCYTE [DISTWIDTH] IN BLOOD BY AUTOMATED COUNT: 17.1 % (ref 10–15)
GFR SERPL CREATININE-BSD FRML MDRD: 74 ML/MIN/{1.73_M2}
GLUCOSE SERPL-MCNC: 99 MG/DL (ref 70–99)
HCT VFR BLD AUTO: 31 % (ref 35–47)
HCT VFR BLD AUTO: 32.4 % (ref 35–47)
HGB BLD-MCNC: 9.6 G/DL (ref 11.7–15.7)
HGB BLD-MCNC: 9.9 G/DL (ref 11.7–15.7)
INR PPP: 1.78 (ref 0.86–1.14)
LDH SERPL L TO P-CCNC: 202 U/L (ref 81–234)
LMWH PPP CHRO-ACNC: 0.16 IU/ML
LMWH PPP CHRO-ACNC: <0.1 IU/ML
MAGNESIUM SERPL-MCNC: 2 MG/DL (ref 1.6–2.3)
MCH RBC QN AUTO: 29.3 PG (ref 26.5–33)
MCH RBC QN AUTO: 29.7 PG (ref 26.5–33)
MCHC RBC AUTO-ENTMCNC: 30.6 G/DL (ref 31.5–36.5)
MCHC RBC AUTO-ENTMCNC: 31 G/DL (ref 31.5–36.5)
MCV RBC AUTO: 96 FL (ref 78–100)
MCV RBC AUTO: 96 FL (ref 78–100)
PLATELET # BLD AUTO: 158 10E9/L (ref 150–450)
PLATELET # BLD AUTO: 183 10E9/L (ref 150–450)
POTASSIUM SERPL-SCNC: 4.1 MMOL/L (ref 3.4–5.3)
PROT SERPL-MCNC: 6.8 G/DL (ref 6.8–8.8)
RBC # BLD AUTO: 3.23 10E12/L (ref 3.8–5.2)
RBC # BLD AUTO: 3.38 10E12/L (ref 3.8–5.2)
SODIUM SERPL-SCNC: 138 MMOL/L (ref 133–144)
WBC # BLD AUTO: 6.9 10E9/L (ref 4–11)
WBC # BLD AUTO: 8.5 10E9/L (ref 4–11)

## 2019-10-05 PROCEDURE — 25000132 ZZH RX MED GY IP 250 OP 250 PS 637: Performed by: STUDENT IN AN ORGANIZED HEALTH CARE EDUCATION/TRAINING PROGRAM

## 2019-10-05 PROCEDURE — 25000128 H RX IP 250 OP 636: Performed by: STUDENT IN AN ORGANIZED HEALTH CARE EDUCATION/TRAINING PROGRAM

## 2019-10-05 PROCEDURE — 25000132 ZZH RX MED GY IP 250 OP 250 PS 637: Performed by: PHYSICIAN ASSISTANT

## 2019-10-05 PROCEDURE — 97116 GAIT TRAINING THERAPY: CPT | Mod: GP | Performed by: PHYSICAL THERAPIST

## 2019-10-05 PROCEDURE — 85027 COMPLETE CBC AUTOMATED: CPT | Performed by: PHYSICIAN ASSISTANT

## 2019-10-05 PROCEDURE — 85027 COMPLETE CBC AUTOMATED: CPT | Performed by: INTERNAL MEDICINE

## 2019-10-05 PROCEDURE — 36415 COLL VENOUS BLD VENIPUNCTURE: CPT | Performed by: INTERNAL MEDICINE

## 2019-10-05 PROCEDURE — 97530 THERAPEUTIC ACTIVITIES: CPT | Mod: GP | Performed by: PHYSICAL THERAPIST

## 2019-10-05 PROCEDURE — 83615 LACTATE (LD) (LDH) ENZYME: CPT | Performed by: INTERNAL MEDICINE

## 2019-10-05 PROCEDURE — 21400000 ZZH R&B CCU UMMC

## 2019-10-05 PROCEDURE — 85520 HEPARIN ASSAY: CPT | Performed by: INTERNAL MEDICINE

## 2019-10-05 PROCEDURE — 25000132 ZZH RX MED GY IP 250 OP 250 PS 637: Performed by: INTERNAL MEDICINE

## 2019-10-05 PROCEDURE — 80053 COMPREHEN METABOLIC PANEL: CPT | Performed by: INTERNAL MEDICINE

## 2019-10-05 PROCEDURE — 99232 SBSQ HOSP IP/OBS MODERATE 35: CPT | Performed by: PHYSICIAN ASSISTANT

## 2019-10-05 PROCEDURE — 85610 PROTHROMBIN TIME: CPT | Performed by: INTERNAL MEDICINE

## 2019-10-05 PROCEDURE — 25000132 ZZH RX MED GY IP 250 OP 250 PS 637: Performed by: SURGERY

## 2019-10-05 PROCEDURE — 71045 X-RAY EXAM CHEST 1 VIEW: CPT

## 2019-10-05 PROCEDURE — 83735 ASSAY OF MAGNESIUM: CPT | Performed by: INTERNAL MEDICINE

## 2019-10-05 RX ORDER — WARFARIN SODIUM 10 MG/1
10 TABLET ORAL
Status: COMPLETED | OUTPATIENT
Start: 2019-10-05 | End: 2019-10-05

## 2019-10-05 RX ADMIN — PANTOPRAZOLE SODIUM 40 MG: 40 TABLET, DELAYED RELEASE ORAL at 18:24

## 2019-10-05 RX ADMIN — WARFARIN SODIUM 10 MG: 10 TABLET ORAL at 18:24

## 2019-10-05 RX ADMIN — THIAMINE HCL TAB 100 MG 100 MG: 100 TAB at 08:26

## 2019-10-05 RX ADMIN — ATORVASTATIN CALCIUM 40 MG: 40 TABLET, FILM COATED ORAL at 20:16

## 2019-10-05 RX ADMIN — DULOXETINE HYDROCHLORIDE 60 MG: 30 CAPSULE, DELAYED RELEASE ORAL at 08:26

## 2019-10-05 RX ADMIN — LISINOPRIL 5 MG: 2.5 TABLET ORAL at 08:26

## 2019-10-05 RX ADMIN — MULTIPLE VITAMINS W/ MINERALS TAB 1 TABLET: TAB at 08:26

## 2019-10-05 RX ADMIN — ACETAMINOPHEN 650 MG: 325 TABLET, FILM COATED ORAL at 08:26

## 2019-10-05 RX ADMIN — ACETAMINOPHEN 650 MG: 325 TABLET, FILM COATED ORAL at 18:24

## 2019-10-05 RX ADMIN — PANTOPRAZOLE SODIUM 40 MG: 40 TABLET, DELAYED RELEASE ORAL at 08:26

## 2019-10-05 RX ADMIN — HEPARIN SODIUM 1200 UNITS/HR: 10000 INJECTION, SOLUTION INTRAVENOUS at 19:41

## 2019-10-05 RX ADMIN — FUROSEMIDE 80 MG: 20 TABLET ORAL at 08:26

## 2019-10-05 ASSESSMENT — MIFFLIN-ST. JEOR: SCORE: 2317.45

## 2019-10-05 ASSESSMENT — ACTIVITIES OF DAILY LIVING (ADL)
ADLS_ACUITY_SCORE: 13
ADLS_ACUITY_SCORE: 13
ADLS_ACUITY_SCORE: 12
ADLS_ACUITY_SCORE: 13

## 2019-10-05 NOTE — PLAN OF CARE
6C: Discharge Planner PT   Patient plan for discharge: rehab  Current status: pt with improved gait tolerance today, ambulates 70ft x 3 with FWW, SBA, and wheelchair follow. Pt needing 1L O2 via NC to maintain SpO2 >90% with activity. SBA for STS transfers.   Barriers to return to prior living situation: deconditioning, SOB  Recommendations for discharge: ARU  Rationale for recommendations: pt with below baseline mobility needing further therapy to maximize IND and endurance. Pt motivated to participate; would tolerate 3 hours of daily therapy.        Entered by: Santa Tyler 10/05/2019 1:47 PM

## 2019-10-05 NOTE — PROGRESS NOTES
[facility]   Cardiology Progress      Interval History: Patient states feels gradual improvement in shortness of breath. Denies nausea, vomiting, chest pain, SOB, cough, abdominal pain, dysuria, new LE pain/swelling. Continues to have adequate UOP with PO diuretic Lasix 80 mg every day. Can titrate based on daily weights. Yesterday evening, ?traumatic left thoracentesis with dark maroon hemorrhagic blood drawn back (possibly venous). Trending hemoglobin and was stable overnight, restarted heparin and warfarin.    Plan:   -Transitioned to oral diuretics Lasix 80 mg every day, to titrate based on I/O   -Repeat CXR today following thoracentesis yesterday  -Trending hemoglobin stable, daily CBC   -Continue heparin gtt for now bridging to therapeutic INR     Physical Exam:  Temp:  [97.6  F (36.4  C)-98.3  F (36.8  C)] 97.9  F (36.6  C)  Pulse:  [80-91] 80  Heart Rate:  [] 98  Resp:  [16-18] 18  BP: ()/(56-82) 129/74  SpO2:  [91 %-98 %] 95 %    Intake/Output Summary (Last 24 hours) at 10/2/2019 1020  Gross per 24 hour   Intake 680 ml   Output 2965 ml   Net -2285 ml     GEN: NAD, obese female lying in bed   Pulm: Difficult exam, diminished left lower lung sounds. ?Rales bilaterally. No appreciable rhonchi  Cardiac: Distant heart sounds secondary to body habitus. JVP not appreciably elevated; body habitus limits this exam. No appreciable murmur.  No LE edema.  Left Groin: incision packed, bandage in place; dehisced wound. No appreciable hematoma, fluctuance, spreading erythema.  GI: soft, obese, non distended  Neuro:  Alert and oriented x4.    Medications:    atorvastatin  40 mg Oral QPM     DULoxetine  60 mg Oral Daily     furosemide  80 mg Oral Daily     lisinopril  5 mg Oral Daily     miconazole   Topical BID     multivitamin w/minerals  1 tablet Oral or Feeding Tube Daily     pantoprazole  40 mg Oral BID AC     sodium chloride (PF)  3 mL Intracatheter Q8H     vitamin B1  100  mg Oral or Feeding Tube Daily       HEParin 1,200 Units/hr (10/05/19 0601)     - MEDICATION INSTRUCTIONS -       - MEDICATION INSTRUCTIONS -       Warfarin Therapy Reminder         Labs:   CMP  Recent Labs   Lab 10/05/19  0521 10/04/19  1410 10/04/19  0704 10/03/19  2049 10/03/19  0604 10/02/19  0545 10/01/19  0548     --  137 135 139 140 142   POTASSIUM 4.1  --  4.2 3.9 3.8 3.8 3.7   CHLORIDE 106  --  105 104 105 106 109   CO2 25  --  26 24 25 26 25   ANIONGAP 8  --  6 8 9 9 8   GLC 99  --  97 110* 96 94 94   BUN 28  --  29 28 22 18 17   CR 0.86 0.82 0.93 1.05* 0.79 0.76 0.72   GFRESTIMATED 74 79 68 58* 83 86 >90   GFRESTBLACK 86 >90 79 68 >90 >90 >90   MARIA INES 9.0  --  9.2 8.9 9.1 9.0 9.4   MAG 2.0  --  2.0 1.8 2.1 2.2 1.9   PHOS  --   --  4.2  --  4.3 4.4 3.9   PROTTOTAL 6.8  --  7.3  --  6.9 7.0 6.8   ALBUMIN 2.8*  --  3.0*  --  2.8* 2.8* 2.8*   BILITOTAL 0.4  --  0.3  --  0.4 0.3 0.3   ALKPHOS 65  --  75  --  67 66 66   AST 12  --  9  --  13 11 11   ALT 18  --  22  --  24 24 23     CBC  Recent Labs   Lab 10/05/19  0041 10/04/19  2027 10/04/19  1654 10/04/19  0704 10/03/19  0604 10/02/19  0545   WBC 8.5  --   --  6.4 6.1 6.8   RBC 3.23*  --   --  3.63* 3.49* 3.45*   HGB 9.6* 10.8* 10.1* 10.7* 10.2* 10.0*   HCT 31.0*  --   --  35.1 33.5* 33.2*   MCV 96  --   --  97 96 96   MCH 29.7  --   --  29.5 29.2 29.0   MCHC 31.0*  --   --  30.5* 30.4* 30.1*   RDW 17.1*  --   --  17.3* 17.5* 17.8*     --   --  164 135* 90*     INR  Recent Labs   Lab 10/05/19  0521 10/04/19  1410 10/04/19  0704 10/03/19  0604   INR 1.78* 1.91* 2.32* 2.81*     ASSESSMENT/PLAN:  Shira Rodriguez is a 59 F with a history of hypertension, hyperlipidemia, sleep apnea, morbid obesity, and chronic bronchitis who presented to [facility] 9/9 with massive PE with subsequent PEA arrest and ROSC after intubation/resuscitation, transferred for [facility] ECMO.     Active issues:   Left panus wound dehisence, monitoring, WOC following see their recs below    Fluid overload, improving and on oral diuretic  Left lower lobe pleural effusion/?necrotizing infection? Without fever or leukocytosis S/P thoracentesis with labs      Changes Today:  - Continue oral Lasix 80 mg daily and monitor UOP. May add afternoon dose.   -  Trending hemoglobin, repeat CXR for concern of traumatic tap and bleed   - ARU pending course, likely tomorrow or Monday      # Acute Hypoxemic Respiratory Failure, improved  # Bilateral Plueral Effusions, small L>R  # Nectrotic Left Lower Lobe on prior imaging, infection versus atelectasis  # Volume Overload, likely 2/2 acute RV failure (resolved) and ECMO resuscitation   Patient treated for aspiration PNA earlier in course. CT 9/21 with possible necrotizing LLL infection versus complete atelectasis. Patient remains afebrile without leukocytosis and clinically improved since 9/21. Would like diagnostic tap, unsure if it would be therapeutic and increase her oxygenation. Her ongoing need for oxygen is likely moreso related to volume overload. Her estimated dry weight is unclear and she is a difficult exam. She still intermittently requiring some oxygen at rest; requiring 2-4L with activity and she has persistent rales in her lung bases. This is likely mutli-factorial and due to her pulmonary edema, her effusion, her obesity, and significant deconditioning. Portable CXR 10/01 showed pulmonary edema, albeit improved as compared to previous exams as well as a persistent L>R pleural effusion. She remained on IV diuresis though 10/4 when she had slight bump in creat. Thus transitioned to oral lasix. Monitoring I/O closely. She is status post LLL thoracentesis with post CXR showing improvement in the size of her effusion but concern for possible venous puncture as hemorrhagic blood drawn back with initial pull. HGB stable 4 hours (10.1 > 10.8 >9.6) after and patient without increasing O2 demands. Tentatively resuming heparin bridging to therapeutic INR. Will  "watch closely today, repeat CXR.   - Lasix 80 mg PO daily   - Port CXR this AM   - STAT CXR if increasing oxygen demands or SOB   - Daily CBC   - IS per nursing      # PEA arrest secondary to massive pulmonary embolism  # Hypoxic respiratory failure - improving  # Volume overload - improving  Transferred to [facility] for [facility] ECMO, and IR thrombectomy and catheter directed lytics on 9/9. Went for VA ECMO decannulation on 9/17 which was complicated by hypercarbic respiratory failure requiring placement of VV ECMO through RIJ-FV bypass. VV ECMO was decannulated on 9/19. The patient has been off sedation since 9/22, and was extubated 9/22. Repeat CT with no residual clot in PA on 09/21, but necrotic LLL as above. RV function improved to normal. INR theraputic as of 09/30/2019.  Please see above discussion re: fluid.  Left groin sutures removed 10/01, left groin wound re-opened and packed. WOC consult placed.  - Heparin bridge to Warfarin, goal INR 2-3. Pharmacy to dose. Resumed PO 10/4.   - Duonebs PRN; Acapella qid   - Wean oxygen as able  - IS     #Pannus wound (right side and right groin)  Patient with dehisced left groin cannula wound, staples removed 10/1. WOC following, appreciate their recs:   Daily    1. Cleanse with gentle soap and water  2. Apply Xeroform (#[id number]) to open wound only  3. Cover with ABD if needed for drainage       L) groin- Remove old dressing very gently.    Cleanse with Microklenz and pat dry    Cut a piece of polymem (#[id number]) to fit into the wound bed and place it. Ensure to cover entire wound bed.    Cover with 4x4 gauze or ABD depending upon drainage.    Change dressing every day.       Abdominal pannus only around intact skin-Cleanse the area and dry thoroughly daily.    Cut a piece of interdry (#[id number]) and place it as a single layer and date it.     Ensure to leave at least 2\" of tail beyond the fold to promote moisture wicking.    May leave same interdry for 5 days if not soiled.  DO not " use this product with any other creams or powder.     # Sinus Pause  Noted overnight 09/29 while sleeping; length of 3.6 seconds.  Discussed with EP; no need for acute intervention. Will stop BB and plan to discharge the patient on monitor. Per patient, with previous sleep study she was also noted to have a significant pause while on CPAP. She has a CPAP at home but tolerates it only for an hour or two.   - Ziopatch x14 days at discharge  - EP consult; appreciate formal recommendations  - Sleep specialist after discharge to home      # Acute Thrombocytopenia (resolved)   Thrombocytopenia 60-80k baseline 180k. Had precipitous drop in platelets after admission - suspected due to consumption. 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 Has stabilized   - CBC daily     # Afib/Aflutter  Noted to be in atrial fibrillation while in the ICU and on dobutamine, which was thought to be exacerbating factor. Dobutamine discontinued 9/23 and patient was given amiodarone bolus at that time. She converted to sinus. On 9/25 she went into atrial flutter with variable block. Again was given an amio bolus, fluid bolus, and IV metop x1. Continues to intermittently go into a-fib - rates now controlled. Has been in sinus rhythm x24 hours.  Seen by EP 09/30 given sinus pause; see recommendations as above  - holding BB due to sinus pause  - warfarin as above, held for now for possible procedure     #COPD  # History of tobacco abuse  # Aspiration pneumonia - resolved  Sputum cultures:               - 9/11 MSSA: vancomycin/zosyn x5 days for ECMO, vanc last dose 9/15, cefazolin on 9/17-9/22               - 9/20 Serratia (ceftriaxone susceptible): Ceftriaxone 9/23-9/28   - s/p prednisone burst     # History of depression  Cymbalta held while in ICU. Will resume at lower dose over next few days, and increase to PTA dose. Additionally reports that she may have been on Wellbutrin, however, notes regarding this drug state she had increased  crying episodes while on this medication and it appears to have been stopped.  - up titrated to home 60mg Cymbalta      # HTN   BP improved today  - start 5mg lisinopril today  - continue to hold PTA spironolactone     # HLD  - Atorvastatin 40mg     # CYRUS   Patient diagnosed with sleep apnea, but refuses to use BiPAP/CPAP at home due to mask discomfort.  - Oxygen at night PRN to keep O2 sats >90%.      # Rheumatoid Arthritis  - holding PTA methotrexate/Wellcovorin while inpatient            Patient discussed with Dr. Frey, who agrees with above plan.      Sharan Campuzano PA-C  [facility]  Pager [phone]

## 2019-10-05 NOTE — PHARMACY-ANTICOAGULATION SERVICE
Clinical Pharmacy - Warfarin Dosing Consult     Pharmacy has been consulted to manage this patient s warfarin therapy.  Indication: DVT/ PE Treatment  Therapy Goal: INR 2-3  Warfarin Prior to Admission: No  Recent documented change in oral intake/nutrition: Unknown  Dose Comments: No dose - patient to get thoracentesis    INR   Date Value Ref Range Status   10/04/2019 1.91 (H) 0.86 - 1.14 Final   10/04/2019 2.32 (H) 0.86 - 1.14 Final       Recommend warfarin 7.5 mg today.  Pharmacy will monitor Shira Rodriguez daily and order warfarin doses to achieve specified goal.      Please contact pharmacy as soon as possible if the warfarin needs to be held for a procedure or if the warfarin goals change.      Ramez Shin, Pharm.D.

## 2019-10-05 NOTE — PLAN OF CARE
OT/6C: Cancel. Patient with visitor upon attempt. Politely requests to reschedule tomorrow. Reports to have been completing toileting in bathroom vs commode. Encouraged to continue to promote increased endurance.

## 2019-10-05 NOTE — PROGRESS NOTES
[id number]:  Reason for admission: PE arrest s/p thrombectomy and ECMO.     Neuro: A&Ox4. Calls appropriately.   Activity: Out of bed with SBA and walker.   Vitals: VSS on 1.5L NC.   LDAS: R PIV with Heparin drip at 1200 units/hr.   Cardiac: NS, 80s.    Respiratory: Hx of sleep apnea- placed 1.5L NC to keep sats >92%.     GI/: Voiding spontaneously with adequate output. No BM, positive bowel sounds.   Skin: Groin dressings, CDI. Interdry between skin layers.   Pain: C/o mild discomfort from CPR but declines intervention.    Diet: Regular.   Labs: 0100 Hgb 9.6. Morning Hep10A: 0.16, therapeutic no rate changes.   Plan:  Will continue to monitor and follow POC.

## 2019-10-06 ENCOUNTER — APPOINTMENT (OUTPATIENT)
Dept: OCCUPATIONAL THERAPY | Facility: CLINIC | Age: 59
DRG: 003 | End: 2019-10-06
Payer: COMMERCIAL

## 2019-10-06 LAB
ALBUMIN SERPL-MCNC: 2.7 G/DL (ref 3.4–5)
ALP SERPL-CCNC: 68 U/L (ref 40–150)
ALT SERPL W P-5'-P-CCNC: 18 U/L (ref 0–50)
ANION GAP SERPL CALCULATED.3IONS-SCNC: 5 MMOL/L (ref 3–14)
AST SERPL W P-5'-P-CCNC: 15 U/L (ref 0–45)
BILIRUB SERPL-MCNC: 0.4 MG/DL (ref 0.2–1.3)
BUN SERPL-MCNC: 21 MG/DL (ref 7–30)
CALCIUM SERPL-MCNC: 9.2 MG/DL (ref 8.5–10.1)
CHLORIDE SERPL-SCNC: 107 MMOL/L (ref 94–109)
CO2 SERPL-SCNC: 26 MMOL/L (ref 20–32)
CREAT SERPL-MCNC: 0.75 MG/DL (ref 0.52–1.04)
ERYTHROCYTE [DISTWIDTH] IN BLOOD BY AUTOMATED COUNT: 17.2 % (ref 10–15)
GFR SERPL CREATININE-BSD FRML MDRD: 87 ML/MIN/{1.73_M2}
GLUCOSE SERPL-MCNC: 93 MG/DL (ref 70–99)
HCT VFR BLD AUTO: 31.6 % (ref 35–47)
HGB BLD-MCNC: 9.7 G/DL (ref 11.7–15.7)
INR PPP: 2.1 (ref 0.86–1.14)
LMWH PPP CHRO-ACNC: 0.1 IU/ML
MAGNESIUM SERPL-MCNC: 2 MG/DL (ref 1.6–2.3)
MCH RBC QN AUTO: 29.4 PG (ref 26.5–33)
MCHC RBC AUTO-ENTMCNC: 30.7 G/DL (ref 31.5–36.5)
MCV RBC AUTO: 96 FL (ref 78–100)
PLATELET # BLD AUTO: 115 10E9/L (ref 150–450)
POTASSIUM SERPL-SCNC: 3.9 MMOL/L (ref 3.4–5.3)
PROT SERPL-MCNC: 7.1 G/DL (ref 6.8–8.8)
RBC # BLD AUTO: 3.3 10E12/L (ref 3.8–5.2)
SODIUM SERPL-SCNC: 138 MMOL/L (ref 133–144)
WBC # BLD AUTO: 5.3 10E9/L (ref 4–11)

## 2019-10-06 PROCEDURE — 25000132 ZZH RX MED GY IP 250 OP 250 PS 637: Performed by: PHYSICIAN ASSISTANT

## 2019-10-06 PROCEDURE — 97530 THERAPEUTIC ACTIVITIES: CPT | Mod: GO

## 2019-10-06 PROCEDURE — 97535 SELF CARE MNGMENT TRAINING: CPT | Mod: GO

## 2019-10-06 PROCEDURE — 36415 COLL VENOUS BLD VENIPUNCTURE: CPT | Performed by: INTERNAL MEDICINE

## 2019-10-06 PROCEDURE — 25000132 ZZH RX MED GY IP 250 OP 250 PS 637: Performed by: STUDENT IN AN ORGANIZED HEALTH CARE EDUCATION/TRAINING PROGRAM

## 2019-10-06 PROCEDURE — 25000132 ZZH RX MED GY IP 250 OP 250 PS 637: Performed by: SURGERY

## 2019-10-06 PROCEDURE — 83735 ASSAY OF MAGNESIUM: CPT | Performed by: INTERNAL MEDICINE

## 2019-10-06 PROCEDURE — 85610 PROTHROMBIN TIME: CPT | Performed by: STUDENT IN AN ORGANIZED HEALTH CARE EDUCATION/TRAINING PROGRAM

## 2019-10-06 PROCEDURE — 85027 COMPLETE CBC AUTOMATED: CPT | Performed by: INTERNAL MEDICINE

## 2019-10-06 PROCEDURE — 25000125 ZZHC RX 250: Performed by: SURGERY

## 2019-10-06 PROCEDURE — 99232 SBSQ HOSP IP/OBS MODERATE 35: CPT | Performed by: PHYSICIAN ASSISTANT

## 2019-10-06 PROCEDURE — 25000132 ZZH RX MED GY IP 250 OP 250 PS 637: Performed by: INTERNAL MEDICINE

## 2019-10-06 PROCEDURE — 21400000 ZZH R&B CCU UMMC

## 2019-10-06 PROCEDURE — 85520 HEPARIN ASSAY: CPT | Performed by: STUDENT IN AN ORGANIZED HEALTH CARE EDUCATION/TRAINING PROGRAM

## 2019-10-06 PROCEDURE — 80053 COMPREHEN METABOLIC PANEL: CPT | Performed by: INTERNAL MEDICINE

## 2019-10-06 RX ORDER — WARFARIN SODIUM 7.5 MG/1
7.5 TABLET ORAL
Status: COMPLETED | OUTPATIENT
Start: 2019-10-06 | End: 2019-10-06

## 2019-10-06 RX ORDER — POTASSIUM CL/LIDO/0.9 % NACL 10MEQ/0.1L
10 INTRAVENOUS SOLUTION, PIGGYBACK (ML) INTRAVENOUS
Status: DISCONTINUED | OUTPATIENT
Start: 2019-10-06 | End: 2019-10-07 | Stop reason: HOSPADM

## 2019-10-06 RX ORDER — POTASSIUM CHLORIDE 750 MG/1
20-40 TABLET, EXTENDED RELEASE ORAL
Status: DISCONTINUED | OUTPATIENT
Start: 2019-10-06 | End: 2019-10-07 | Stop reason: HOSPADM

## 2019-10-06 RX ORDER — POTASSIUM CHLORIDE 7.45 MG/ML
10 INJECTION INTRAVENOUS
Status: DISCONTINUED | OUTPATIENT
Start: 2019-10-06 | End: 2019-10-07 | Stop reason: HOSPADM

## 2019-10-06 RX ORDER — POTASSIUM CHLORIDE 1.5 G/1.58G
20-40 POWDER, FOR SOLUTION ORAL
Status: DISCONTINUED | OUTPATIENT
Start: 2019-10-06 | End: 2019-10-07 | Stop reason: HOSPADM

## 2019-10-06 RX ORDER — POTASSIUM CHLORIDE 29.8 MG/ML
20 INJECTION INTRAVENOUS
Status: DISCONTINUED | OUTPATIENT
Start: 2019-10-06 | End: 2019-10-07 | Stop reason: HOSPADM

## 2019-10-06 RX ADMIN — ACETAMINOPHEN 650 MG: 325 TABLET, FILM COATED ORAL at 06:51

## 2019-10-06 RX ADMIN — WARFARIN SODIUM 7.5 MG: 7.5 TABLET ORAL at 17:37

## 2019-10-06 RX ADMIN — PANTOPRAZOLE SODIUM 40 MG: 40 TABLET, DELAYED RELEASE ORAL at 16:04

## 2019-10-06 RX ADMIN — POTASSIUM CHLORIDE 20 MEQ: 750 TABLET, EXTENDED RELEASE ORAL at 16:04

## 2019-10-06 RX ADMIN — ACETAMINOPHEN 650 MG: 325 TABLET, FILM COATED ORAL at 16:06

## 2019-10-06 RX ADMIN — PANTOPRAZOLE SODIUM 40 MG: 40 TABLET, DELAYED RELEASE ORAL at 08:39

## 2019-10-06 RX ADMIN — MULTIPLE VITAMINS W/ MINERALS TAB 1 TABLET: TAB at 08:39

## 2019-10-06 RX ADMIN — ATORVASTATIN CALCIUM 40 MG: 40 TABLET, FILM COATED ORAL at 20:03

## 2019-10-06 RX ADMIN — THIAMINE HCL TAB 100 MG 100 MG: 100 TAB at 08:39

## 2019-10-06 RX ADMIN — LISINOPRIL 5 MG: 2.5 TABLET ORAL at 08:39

## 2019-10-06 RX ADMIN — DULOXETINE HYDROCHLORIDE 60 MG: 30 CAPSULE, DELAYED RELEASE ORAL at 08:39

## 2019-10-06 RX ADMIN — Medication 2 G: at 10:52

## 2019-10-06 RX ADMIN — FUROSEMIDE 80 MG: 20 TABLET ORAL at 08:39

## 2019-10-06 ASSESSMENT — ACTIVITIES OF DAILY LIVING (ADL)
ADLS_ACUITY_SCORE: 13

## 2019-10-06 ASSESSMENT — MIFFLIN-ST. JEOR: SCORE: 2309.74

## 2019-10-06 NOTE — PLAN OF CARE
OT/6C:  Discharge Planner OT   Patient plan for discharge: Rehab.   Current status: Facilitating shower to increase ADL independence. Patient ambulates ~10 feet to wheelchair with SBA. Completes all transfers during session with SBA, including during shower. Max A required to wash back/bottom and distal LE's (mostly due to fear with high shower chair height). Does require rest breaks but is able to pace self. IND with L sock using modified techniques; Max A to don R sock. Rates shower 7/10 exertion. HR up to 110's; O2 > 90% on RA upon return to room. Does not have street clothes available to facilitate dressing (family to bring today).   Barriers to return to prior living situation: Medical Status, Activity Tolerance, Strength  Recommendations for discharge: Rehab. TCU vs ARU.   Rationale for recommendations: Patient progressing well with therapies while inpatient. Completing several ADL tasks SBA (toileting, g/h, UB bathing excluding back). Largest barriers including activity tolerance/functional endurance. Patient noting to have had to make modifications to IADL activities this summer (sitting to complete tasks) due to back pain/endurance. Patient is motivated and would be able to tolerate 3 hours of therapies per day, but may be too high level for ADLs for ARU needs, pending LOS.        Entered by: Mari Howard 10/06/2019 10:51 AM

## 2019-10-06 NOTE — PROGRESS NOTES
[facility]   Cardiology Progress      Interval History: NO acute events overnight. Tenderness to left chest wall status post thoracentesis, stable. Denies nausea, vomiting, chest pain, SOB, cough, abdominal pain, dysuria, new LE pain/swelling. Continues to have adequate UOP with PO diuretic Lasix 80 mg every day. INR is 2.1 today so will stop heparin gtt. VSS.    Plan:   -Transitioned to oral diuretics Lasix 80 mg every day, to titrate based on I/O   -Patient medically ready for ARU, awaiting disposition     Physical Exam:  Temp:  [97.4  F (36.3  C)-98.2  F (36.8  C)] 97.9  F (36.6  C)  Pulse:  [104] 104  Heart Rate:  [77-96] 82  Resp:  [16-20] 18  BP: (103-141)/(64-73) 112/69  SpO2:  [89 %-96 %] 95 %    Intake/Output Summary (Last 24 hours) at 10/2/2019 1020  Gross per 24 hour   Intake 680 ml   Output 2965 ml   Net -2285 ml     GEN: NAD, obese female lying in bed   Pulm: Difficult exam, diminished left lower lung sounds. ?Rales bilaterally. No appreciable rhonchi  Cardiac: Distant heart sounds secondary to body habitus. JVP not appreciably elevated; body habitus limits this exam. No appreciable murmur.  No LE edema.  Left Groin: incision packed, bandage in place; dehisced wound under panus. No appreciable hematoma, fluctuance, or spreading erythema.  GI: soft, obese, non distended  Neuro:  Alert and oriented x4.    Medications:    atorvastatin  40 mg Oral QPM     DULoxetine  60 mg Oral Daily     furosemide  80 mg Oral Daily     lisinopril  5 mg Oral Daily     multivitamin w/minerals  1 tablet Oral or Feeding Tube Daily     pantoprazole  40 mg Oral BID AC     sodium chloride (PF)  3 mL Intracatheter Q8H     vitamin B1  100 mg Oral or Feeding Tube Daily       - MEDICATION INSTRUCTIONS -       - MEDICATION INSTRUCTIONS -       Warfarin Therapy Reminder         Labs:   CMP  Recent Labs   Lab 10/06/19  0702 10/05/19  0521 10/04/19  1410 10/04/19  0704 10/03/19  2049 10/03/19  0604  10/02/19  0545 10/01/19  0548    138  --  137 135 139 140 142   POTASSIUM 3.9 4.1  --  4.2 3.9 3.8 3.8 3.7   CHLORIDE 107 106  --  105 104 105 106 109   CO2 26 25  --  26 24 25 26 25   ANIONGAP 5 8  --  6 8 9 9 8   GLC 93 99  --  97 110* 96 94 94   BUN 21 28  --  29 28 22 18 17   CR 0.75 0.86 0.82 0.93 1.05* 0.79 0.76 0.72   GFRESTIMATED 87 74 79 68 58* 83 86 >90   GFRESTBLACK >90 86 >90 79 68 >90 >90 >90   MARIA INES 9.2 9.0  --  9.2 8.9 9.1 9.0 9.4   MAG 2.0 2.0  --  2.0 1.8 2.1 2.2 1.9   PHOS  --   --   --  4.2  --  4.3 4.4 3.9   PROTTOTAL 7.1 6.8  --  7.3  --  6.9 7.0 6.8   ALBUMIN 2.7* 2.8*  --  3.0*  --  2.8* 2.8* 2.8*   BILITOTAL 0.4 0.4  --  0.3  --  0.4 0.3 0.3   ALKPHOS 68 65  --  75  --  67 66 66   AST 15 12  --  9  --  13 11 11   ALT 18 18  --  22  --  24 24 23     CBC  Recent Labs   Lab 10/06/19  0702 10/05/19  0521 10/05/19  0041 10/04/19  2027  10/04/19  0704   WBC 5.3 6.9 8.5  --   --  6.4   RBC 3.30* 3.38* 3.23*  --   --  3.63*   HGB 9.7* 9.9* 9.6* 10.8*   < > 10.7*   HCT 31.6* 32.4* 31.0*  --   --  35.1   MCV 96 96 96  --   --  97   MCH 29.4 29.3 29.7  --   --  29.5   MCHC 30.7* 30.6* 31.0*  --   --  30.5*   RDW 17.2* 17.1* 17.1*  --   --  17.3*   * 158 183  --   --  164    < > = values in this interval not displayed.     INR  Recent Labs   Lab 10/06/19  0702 10/05/19  0521 10/04/19  1410 10/04/19  0704   INR 2.10* 1.78* 1.91* 2.32*     ASSESSMENT/PLAN:  Shira Rodriguez is a 59 F with a history of hypertension, hyperlipidemia, sleep apnea, morbid obesity, and chronic bronchitis who presented to [facility] 9/9 with massive PE with subsequent PEA arrest and ROSC after intubation/resuscitation, transferred for VA ECMO.     Active issues:   Left panus wound dehisence, monitoring, WOC following see their recs below   Fluid overload, improving and on oral diuretic for >24 hours     Changes Today:  - Continue oral Lasix 80 mg daily and monitor UOP. May add afternoon dose as needed.   - Patient  medically ready for ARU, awaiting disposition     # Acute Hypoxemic Respiratory Failure, improved  # Bilateral Plueral Effusions, small L>R  # Nectrotic Left Lower Lobe on prior imaging, infection versus atelectasis  # Volume Overload, likely 2/2 acute RV failure (resolved) and ECMO resuscitation   She still intermittently requiring some oxygen at rest; requiring 2-4L with activity. This is likely mutli-factorial and due to her pulmonary edema, her effusion, her obesity, and significant deconditioning. Patient treated for aspiration PNA earlier in course. CT 9/21 with possible necrotizing LLL infection versus complete atelectasis. Patient has remained afebrile without leukocytosis and clinically improving since 9/21 arguing against a necrotizing infection. We did do a diagnostic tap, likely transudate pleural effusion 2/2 acute PH with her acute massive PE, acute heart failure (gradually improving), and deconditioning due to prolonged ICU stay with hypoalbuminemia.  Her estimated dry weight is unclear and she is a difficult exam but did receive copious fluid resuscitation during her ICU stay, now diuresed approximately 30 lbs with gradual improvement in her oxygenation. Serial CXRs showed pulmonary edema, albeit improving as compared to previous exams, as well as a persistent L>R small pleural effusion. She remained on IV diuresis though 10/4 when she had slight bump in creat. Thus transitioned to oral lasix. Monitoring I/O closely. She will continue to need oral diuresis and pulmonary rehab with incentive spirometry.   - Lasix 80 mg PO daily   - IS per nursing      # PEA arrest secondary to massive pulmonary embolism  # Hypoxic respiratory failure - improving  # Volume overload - improving  Transferred to [facility] for VA ECMO, and IR thrombectomy and catheter directed lytics on 9/9. Went for VA ECMO decannulation on 9/17 which was complicated by hypercarbic respiratory failure requiring placement of VV ECMO through  "RIJ-FV bypass. VV ECMO was decannulated on 9/19. The patient has been off sedation since 9/22, and was extubated 9/22. Repeat CT with no residual clot in PA on 09/21, but necrotic LLL as above. RV function improved to normal. INR theraputic as of 09/30/2019.  Please see above discussion re: fluid.  Left groin sutures removed 10/01, left groin wound re-opened and packed. WOC consult placed.  - Coumadin with goal INR 2-3. Would recommend lifelong.   - Duonebs PRN; Acapella qid   - Wean oxygen as able  - IS     #Pannus wound (right side and right groin)  Patient with dehisced left groin cannula wound, staples removed 10/1 with dehiscence shortly after. WOC following, appreciate their recs:     Daily    1. Cleanse with gentle soap and water  2. Apply Xeroform (#185302) to open wound only  3. Cover with ABD if needed for drainage       L) groin- Remove old dressing very gently.    Cleanse with Microklenz and pat dry    Cut a piece of polymem (#014386) to fit into the wound bed and place it. Ensure to cover entire wound bed.    Cover with 4x4 gauze or ABD depending upon drainage.    Change dressing every day.       Abdominal pannus only around intact skin-Cleanse the area and dry thoroughly daily.    Cut a piece of interdry (#459509) and place it as a single layer and date it.     Ensure to leave at least 2\" of tail beyond the fold to promote moisture wicking.    May leave same interdry for 5 days if not soiled.  DO not use this product with any other creams or powder.     # Sinus Pause  Noted overnight 09/29 while sleeping; length of 3.6 seconds.  Discussed with EP; no need for acute intervention. Will stop BB and plan to discharge the patient on monitor. Per patient, with previous sleep study she was also noted to have a significant pause while on CPAP. She has a CPAP at home but tolerates it only for an hour or two.   - Ziopatch x14 days at discharge  - EP consult; appreciate formal recommendations  - Sleep specialist " after discharge to home      # Acute Thrombocytopenia (resolved)   Thrombocytopenia 60-80k baseline 180k. Had precipitous drop in platelets after admission - suspected due to consumption. 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 Has stabilized   - CBC daily     # Afib/Aflutter  Noted to be in atrial fibrillation while in the ICU and on dobutamine, which was thought to be exacerbating factor. Dobutamine discontinued 9/23 and patient was given amiodarone bolus at that time. She converted to sinus. On 9/25 she went into atrial flutter with variable block. Again was given an amio bolus, fluid bolus, and IV metop x1. Continues to intermittently go into a-fib - rates now controlled. Has been in sinus rhythm x24 hours.  Seen by EP 09/30 given sinus pause; see recommendations as above  - holding BB due to sinus pause  - warfarin as above, held for now for possible procedure     #COPD  # History of tobacco abuse  # Aspiration pneumonia - resolved  Sputum cultures:               - 9/11 MSSA: vancomycin/zosyn x5 days for ECMO, vanc last dose 9/15, cefazolin on 9/17-9/22               - 9/20 Serratia (ceftriaxone susceptible): Ceftriaxone 9/23-9/28   - s/p prednisone burst     # History of depression  Cymbalta held while in ICU. Will resume at lower dose over next few days, and increase to PTA dose. Additionally reports that she may have been on Wellbutrin, however, notes regarding this drug state she had increased crying episodes while on this medication and it appears to have been stopped.  - up titrated to home 60mg Cymbalta      # HTN   BP improved today  - start 5mg lisinopril today  - continue to hold PTA spironolactone     # HLD  - Atorvastatin 40mg     # CYRUS   Patient diagnosed with sleep apnea, but refuses to use BiPAP/CPAP at home due to mask discomfort.  - Oxygen at night PRN to keep O2 sats >90%.      # Rheumatoid Arthritis  - holding PTA methotrexate/Wellcovorin while inpatient            Patient discussed  with Dr. Frey, who agrees with above plan.      Sharan Campuzano PA-C  [facility]  Pager [phone]

## 2019-10-06 NOTE — PLAN OF CARE
Pt has been a/o x 4 overnight. Tele SR. VSS. LS with crackles in LLL, diminished RLL. Needed O2 1L on at night due to sats dropping while sleeping. Has SANTOS. Gets up to BR with SBA of one and walker. Voiding good amounts. Needs assist of 2 to get back into bed. L groin, R pannus and R groin sites intact. Interdry to skin folds. Heparin gtt infusing at 1200 units/hr. Awaiting am 10a.  Will continue to monitor pt.

## 2019-10-06 NOTE — PROGRESS NOTES
D/I:  58 yo with hx of hypertension, hyperlipidemia, sleep apnea, morbid obesity, and chronic bronchitis who presents to [facility] ED via EMS for evaluation of respiratory distress. S/P PEA arrest and PE. Transferred to [facility] for ECMO placement.      Monitor shows SR 60-90s, up to 120s with activity. Continues on heparin drip at 1200 units/hour per protocol. INR 1.78-received 10 mg warfarin this evening. C/O shortness of breath when up to bathroom, recovered after a few minutes. Wants to avoid O2 if possible. C/O pain from CPR and thoracentesis site-received tylenol with relief. Tearful a few times, feeling like she is not progressing. Wants to go to rehab as soon as able so that she can go home. See flowsheets for assessments and additional data.   A: Stable post-cardiac arrest.   P: Assess and treat for pain as indicated. Monitor respiratory status. Monitor for signs of bleeding. Encourage increased activity and participation in cares. Continue current cares and notify providers with questions or concerns.

## 2019-10-06 NOTE — PROGRESS NOTES
D/I:  60 yo with hx of hypertension, hyperlipidemia, sleep apnea, morbid obesity, and chronic bronchitis who presents to [facility] ED via EMS for evaluation of respiratory distress. S/P PEA arrest and PE. Transferred to [facility] for ECMO placement.      Monitor shows SR 90s. INR 2.10-heparin drip discontinued and received 7.5 mg warfarin this evening. C/O shortness of breath when up to bathroom, recovered after a few minutes. Wants to avoid O2 if possible. Up to and from bathroom independently this afternoon. C/O pain from CPR and thoracentesis site-received tylenol with relief. Visitors present this shift. Eager to go to rehab as that will get her closer to discharging to home. See flowsheets for assessments and additional data.   A: Stable post-cardiac arrest.   P: Assess and treat for pain as indicated. Monitor respiratory status. Continue increase in activity and participation in cares. Continue current cares and notify providers with questions or concerns.

## 2019-10-06 NOTE — PROGRESS NOTES
"[facility]     Pulmonary, Allergy, Critical Care and Sleep Medicine     Follow-up Note    10/06/2019      Impression/Recommendations:  59 year old female with PMHx most significant for HTN, HLD, CYRUS, obesity and chronic bronchitis that presented to [facility] on 2019 with a massive PE with PEA arrest.  Now s/p mechanical thrombectomy, lytics and VA ECMO.  Developed aspiration pneumonia.  Extubated on .  Now improving overall.  CT with possible LLL necrotizing infection on .       # LLL opacity  Complex history with known, now treated, aspiration PNA.  LLL atelectasis vs necrotizing infection seen in on CT on .   Pleural fluid sampled and showing exudative effusion.  No e/o infection and has complete course of abx.  At this time, we would repeat CT in 4-6 weeks to monitor LLL consolidation/effusion.  Further drainage or chest tube placement is likely of little benefit. May eventually develop some chronic effusion but this should be ok.         Recommendations:  - Repeat CT chest 4-6 week   - Monitor pleural fluid culture, if (+) would need chest tube  - Continue flutter valve for help with expectoration   - Consider addition of saline nebs to assist with mobilization of thick secretions     Pulmonary will continue to follow.       Patient seen & discussed w/  Dr. Grayson who agrees with the plan.      Geoff Mary MD, MPH  Pulmonary & Critical Care, PGY-6  [phone]      ===================================================================    Interval history: No acute events. Continues to improve overall.  Tolerated diagnostic thora the other day without any significant complication.     Nursing notes reviewed.    Objective:  /67 (BP Location: Left arm)   Pulse 104   Temp 97.7  F (36.5  C) (Oral)   Resp 16   Ht 1.676 m (5' 5.98\")   Wt (!) 171.3 kg (377 lb 11.2 oz)   SpO2 95%   BMI 60.99 kg/m    Temp (24hrs), Av.9  F (36.6  C), Min:97.6  F (36.4  C), Max:98.2  F " (36.8  C)    General: NAD, seated in chair  HEENT: Anicteric sclera, EOMI, MMM; no cervical LAD  CV: RRR, no m/r/g  Lungs: Crackles on the left base, no wheeze, right clear  Abd: Soft, obese, NT, ND  Ext: WWP, trace LE edema  Skin: No rashes, cyanosis, or jaundice  Neuro: AAOx3, no focal deficitsts    Labs: reviewed in EMR.    Pleural fluid  - LDH 2788 (serum 202)  - Total protein 4.6 (serum 6.8)  -  (61% lymphs), glucose <1  - gram stain w/o organism  - Culture - NGTD    Imaging: reviewed in EMR.  CXR - left pleural effusion

## 2019-10-07 ENCOUNTER — APPOINTMENT (OUTPATIENT)
Dept: OCCUPATIONAL THERAPY | Facility: CLINIC | Age: 59
DRG: 003 | End: 2019-10-07
Payer: COMMERCIAL

## 2019-10-07 ENCOUNTER — HOSPITAL ENCOUNTER (INPATIENT)
Facility: CLINIC | Age: 59
LOS: 7 days | Discharge: HOME | DRG: 176 | End: 2019-10-14
Attending: PHYSICAL MEDICINE & REHABILITATION | Admitting: PHYSICAL MEDICINE & REHABILITATION
Payer: COMMERCIAL

## 2019-10-07 ENCOUNTER — APPOINTMENT (OUTPATIENT)
Dept: CARDIOLOGY | Facility: CLINIC | Age: 59
DRG: 003 | End: 2019-10-07
Attending: NURSE PRACTITIONER
Payer: COMMERCIAL

## 2019-10-07 ENCOUNTER — TRANSFERRED RECORDS (OUTPATIENT)
Dept: HEALTH INFORMATION MANAGEMENT | Facility: CLINIC | Age: 59
End: 2019-10-07

## 2019-10-07 VITALS
HEIGHT: 66 IN | OXYGEN SATURATION: 97 % | DIASTOLIC BLOOD PRESSURE: 73 MMHG | SYSTOLIC BLOOD PRESSURE: 105 MMHG | BODY MASS INDEX: 47.09 KG/M2 | RESPIRATION RATE: 20 BRPM | TEMPERATURE: 97.4 F | HEART RATE: 86 BPM | WEIGHT: 293 LBS

## 2019-10-07 DIAGNOSIS — R06.09 DOE (DYSPNEA ON EXERTION): ICD-10-CM

## 2019-10-07 DIAGNOSIS — E78.5 HYPERLIPIDEMIA LDL GOAL <100: ICD-10-CM

## 2019-10-07 DIAGNOSIS — I26.99 PULMONARY EMBOLISM, UNSPECIFIED CHRONICITY, UNSPECIFIED PULMONARY EMBOLISM TYPE, UNSPECIFIED WHETHER ACUTE COR PULMONALE PRESENT (H): ICD-10-CM

## 2019-10-07 DIAGNOSIS — E66.01 MORBID OBESITY (H): Chronic | ICD-10-CM

## 2019-10-07 DIAGNOSIS — M17.11 OSTEOARTHRITIS OF RIGHT KNEE, UNSPECIFIED OSTEOARTHRITIS TYPE: ICD-10-CM

## 2019-10-07 DIAGNOSIS — M05.79 RHEUMATOID ARTHRITIS INVOLVING MULTIPLE SITES WITH POSITIVE RHEUMATOID FACTOR (H): ICD-10-CM

## 2019-10-07 DIAGNOSIS — I46.9 CARDIAC ARREST (H): ICD-10-CM

## 2019-10-07 DIAGNOSIS — I26.09 PULMONARY EMBOLISM WITH ACUTE COR PULMONALE, UNSPECIFIED CHRONICITY, UNSPECIFIED PULMONARY EMBOLISM TYPE (H): ICD-10-CM

## 2019-10-07 DIAGNOSIS — M05.741 RHEUMATOID ARTHRITIS INVOLVING BOTH HANDS WITH POSITIVE RHEUMATOID FACTOR (H): ICD-10-CM

## 2019-10-07 DIAGNOSIS — T45.1X5A ADVERSE EFFECT OF METHOTREXATE, INITIAL ENCOUNTER: ICD-10-CM

## 2019-10-07 DIAGNOSIS — Z00.00 PREVENTATIVE HEALTH CARE: ICD-10-CM

## 2019-10-07 DIAGNOSIS — M05.742 RHEUMATOID ARTHRITIS INVOLVING BOTH HANDS WITH POSITIVE RHEUMATOID FACTOR (H): ICD-10-CM

## 2019-10-07 DIAGNOSIS — Z79.899 HIGH RISK MEDICATION USE: ICD-10-CM

## 2019-10-07 DIAGNOSIS — L29.9 PRURITIC DISORDER: Primary | ICD-10-CM

## 2019-10-07 DIAGNOSIS — G47.33 OSA (OBSTRUCTIVE SLEEP APNEA): Chronic | ICD-10-CM

## 2019-10-07 DIAGNOSIS — F43.21 ADJUSTMENT DISORDER WITH DEPRESSED MOOD: Chronic | ICD-10-CM

## 2019-10-07 LAB
ALBUMIN SERPL-MCNC: 2.8 G/DL (ref 3.4–5)
ALP SERPL-CCNC: 67 U/L (ref 40–150)
ALT SERPL W P-5'-P-CCNC: 19 U/L (ref 0–50)
ANION GAP SERPL CALCULATED.3IONS-SCNC: 6 MMOL/L (ref 3–14)
AST SERPL W P-5'-P-CCNC: 14 U/L (ref 0–45)
BILIRUB SERPL-MCNC: 0.3 MG/DL (ref 0.2–1.3)
BUN SERPL-MCNC: 19 MG/DL (ref 7–30)
CALCIUM SERPL-MCNC: 9.3 MG/DL (ref 8.5–10.1)
CHLORIDE SERPL-SCNC: 106 MMOL/L (ref 94–109)
CO2 SERPL-SCNC: 26 MMOL/L (ref 20–32)
CREAT SERPL-MCNC: 0.71 MG/DL (ref 0.52–1.04)
ERYTHROCYTE [DISTWIDTH] IN BLOOD BY AUTOMATED COUNT: 17 % (ref 10–15)
GFR SERPL CREATININE-BSD FRML MDRD: >90 ML/MIN/{1.73_M2}
GLUCOSE SERPL-MCNC: 92 MG/DL (ref 70–99)
HCT VFR BLD AUTO: 31.7 % (ref 35–47)
HGB BLD-MCNC: 9.7 G/DL (ref 11.7–15.7)
INR PPP: 2.22 (ref 0.86–1.14)
MAGNESIUM SERPL-MCNC: 2.2 MG/DL (ref 1.6–2.3)
MCH RBC QN AUTO: 29.2 PG (ref 26.5–33)
MCHC RBC AUTO-ENTMCNC: 30.6 G/DL (ref 31.5–36.5)
MCV RBC AUTO: 96 FL (ref 78–100)
PLATELET # BLD AUTO: 144 10E9/L (ref 150–450)
POTASSIUM SERPL-SCNC: 4 MMOL/L (ref 3.4–5.3)
PROT SERPL-MCNC: 7.4 G/DL (ref 6.8–8.8)
RBC # BLD AUTO: 3.32 10E12/L (ref 3.8–5.2)
SODIUM SERPL-SCNC: 138 MMOL/L (ref 133–144)
WBC # BLD AUTO: 5.6 10E9/L (ref 4–11)

## 2019-10-07 PROCEDURE — 25000132 ZZH RX MED GY IP 250 OP 250 PS 637: Performed by: PHYSICIAN ASSISTANT

## 2019-10-07 PROCEDURE — 25000132 ZZH RX MED GY IP 250 OP 250 PS 637: Performed by: SURGERY

## 2019-10-07 PROCEDURE — 97110 THERAPEUTIC EXERCISES: CPT | Mod: GO

## 2019-10-07 PROCEDURE — 99239 HOSP IP/OBS DSCHRG MGMT >30: CPT | Mod: GC | Performed by: INTERNAL MEDICINE

## 2019-10-07 PROCEDURE — 85027 COMPLETE CBC AUTOMATED: CPT | Performed by: INTERNAL MEDICINE

## 2019-10-07 PROCEDURE — 12800006 ZZH R&B REHAB

## 2019-10-07 PROCEDURE — 0296T ZIO PATCH HOLTER ADULT PEDIATRIC GREATER THAN 48 HRS: CPT

## 2019-10-07 PROCEDURE — 97535 SELF CARE MNGMENT TRAINING: CPT | Mod: GO

## 2019-10-07 PROCEDURE — 25000132 ZZH RX MED GY IP 250 OP 250 PS 637: Performed by: PHYSICAL MEDICINE & REHABILITATION

## 2019-10-07 PROCEDURE — 99233 SBSQ HOSP IP/OBS HIGH 50: CPT | Mod: GC | Performed by: NURSE PRACTITIONER

## 2019-10-07 PROCEDURE — 0298T ZZC EXT ECG > 48HR TO 21 DAY REVIEW AND INTERPRETATN: CPT | Performed by: INTERNAL MEDICINE

## 2019-10-07 PROCEDURE — 85610 PROTHROMBIN TIME: CPT | Performed by: INTERNAL MEDICINE

## 2019-10-07 PROCEDURE — 80053 COMPREHEN METABOLIC PANEL: CPT | Performed by: INTERNAL MEDICINE

## 2019-10-07 PROCEDURE — 83735 ASSAY OF MAGNESIUM: CPT | Performed by: INTERNAL MEDICINE

## 2019-10-07 RX ORDER — WARFARIN SODIUM 5 MG/1
10 TABLET ORAL
Status: COMPLETED | OUTPATIENT
Start: 2019-10-07 | End: 2019-10-07

## 2019-10-07 RX ORDER — WARFARIN SODIUM 5 MG/1
5 TABLET ORAL DAILY
Qty: 30 TABLET | Status: ON HOLD | DISCHARGE
Start: 2019-10-07 | End: 2019-10-14

## 2019-10-07 RX ORDER — MULTIPLE VITAMINS W/ MINERALS TAB 9MG-400MCG
1 TAB ORAL DAILY
Status: ON HOLD | DISCHARGE
Start: 2019-10-08 | End: 2019-10-11

## 2019-10-07 RX ORDER — NYSTATIN 100000 [USP'U]/G
POWDER TOPICAL 2 TIMES DAILY
Status: DISCONTINUED | OUTPATIENT
Start: 2019-10-07 | End: 2019-10-07

## 2019-10-07 RX ORDER — FUROSEMIDE 20 MG
80 TABLET ORAL DAILY
Status: DISCONTINUED | OUTPATIENT
Start: 2019-10-08 | End: 2019-10-14 | Stop reason: HOSPADM

## 2019-10-07 RX ORDER — NYSTATIN 100000 [USP'U]/G
POWDER TOPICAL
Qty: 60 G | Refills: 3 | DISCHARGE
Start: 2019-10-07 | End: 2020-06-12

## 2019-10-07 RX ORDER — LANOLIN ALCOHOL/MO/W.PET/CERES
100 CREAM (GRAM) TOPICAL DAILY
Status: DISCONTINUED | OUTPATIENT
Start: 2019-10-08 | End: 2019-10-14 | Stop reason: HOSPADM

## 2019-10-07 RX ORDER — PANTOPRAZOLE SODIUM 40 MG/1
40 TABLET, DELAYED RELEASE ORAL
Status: DISCONTINUED | OUTPATIENT
Start: 2019-10-07 | End: 2019-10-08

## 2019-10-07 RX ORDER — PANTOPRAZOLE SODIUM 40 MG/1
40 TABLET, DELAYED RELEASE ORAL
Status: ON HOLD | DISCHARGE
Start: 2019-10-07 | End: 2019-10-11

## 2019-10-07 RX ORDER — ATORVASTATIN CALCIUM 40 MG/1
40 TABLET, FILM COATED ORAL EVERY EVENING
Status: DISCONTINUED | OUTPATIENT
Start: 2019-10-07 | End: 2019-10-14 | Stop reason: HOSPADM

## 2019-10-07 RX ORDER — LISINOPRIL 5 MG/1
7.5 TABLET ORAL DAILY
Status: ON HOLD | DISCHARGE
Start: 2019-10-08 | End: 2019-10-11

## 2019-10-07 RX ORDER — ATORVASTATIN CALCIUM 40 MG/1
40 TABLET, FILM COATED ORAL EVERY EVENING
Status: ON HOLD | DISCHARGE
Start: 2019-10-07 | End: 2019-10-11

## 2019-10-07 RX ORDER — CYCLOBENZAPRINE HCL 10 MG
10 TABLET ORAL
Qty: 30 TABLET | Refills: 1 | DISCHARGE
Start: 2019-10-07 | End: 2019-10-23

## 2019-10-07 RX ORDER — LANOLIN ALCOHOL/MO/W.PET/CERES
100 CREAM (GRAM) TOPICAL DAILY
Status: ON HOLD | DISCHARGE
Start: 2019-10-08 | End: 2019-10-11

## 2019-10-07 RX ORDER — FUROSEMIDE 80 MG
80 TABLET ORAL DAILY
Status: ON HOLD | DISCHARGE
Start: 2019-10-08 | End: 2019-10-14

## 2019-10-07 RX ORDER — FOLIC ACID 1 MG/1
4 TABLET ORAL DAILY
Qty: 360 TABLET | Refills: 3 | Status: ON HOLD | DISCHARGE
Start: 2019-10-07 | End: 2019-10-11

## 2019-10-07 RX ORDER — CYCLOBENZAPRINE HCL 10 MG
10 TABLET ORAL
Status: DISCONTINUED | OUTPATIENT
Start: 2019-10-07 | End: 2019-10-14 | Stop reason: HOSPADM

## 2019-10-07 RX ORDER — METHYLPREDNISOLONE 4 MG
TABLET, DOSE PACK ORAL
Qty: 21 TABLET | Refills: 0 | Status: ON HOLD | DISCHARGE
Start: 2019-10-07 | End: 2019-10-11

## 2019-10-07 RX ORDER — DULOXETIN HYDROCHLORIDE 60 MG/1
60 CAPSULE, DELAYED RELEASE ORAL DAILY
Status: DISCONTINUED | OUTPATIENT
Start: 2019-10-08 | End: 2019-10-14 | Stop reason: HOSPADM

## 2019-10-07 RX ORDER — WARFARIN SODIUM 10 MG/1
10 TABLET ORAL
Status: DISCONTINUED | OUTPATIENT
Start: 2019-10-07 | End: 2019-10-07 | Stop reason: HOSPADM

## 2019-10-07 RX ORDER — MULTIPLE VITAMINS W/ MINERALS TAB 9MG-400MCG
1 TAB ORAL DAILY
Status: DISCONTINUED | OUTPATIENT
Start: 2019-10-08 | End: 2019-10-14 | Stop reason: HOSPADM

## 2019-10-07 RX ORDER — LEUCOVORIN CALCIUM 5 MG/1
5 TABLET ORAL
Qty: 4 TABLET | Refills: 3 | Status: ON HOLD | DISCHARGE
Start: 2019-10-07 | End: 2019-10-11

## 2019-10-07 RX ORDER — METHOTREXATE 25 MG/ML
25 INJECTION, SOLUTION INTRA-ARTERIAL; INTRAMUSCULAR; INTRAVENOUS
Qty: 4 ML | Refills: 6 | Status: ON HOLD | DISCHARGE
Start: 2019-10-07 | End: 2019-10-11

## 2019-10-07 RX ORDER — ACETYLCYSTEINE 200 MG/ML
2 SOLUTION ORAL; RESPIRATORY (INHALATION) EVERY 4 HOURS PRN
Status: ON HOLD | DISCHARGE
Start: 2019-10-07 | End: 2019-10-11

## 2019-10-07 RX ORDER — METHYLPREDNISOLONE ACETATE 40 MG/ML
40 INJECTION, SUSPENSION INTRA-ARTICULAR; INTRALESIONAL; INTRAMUSCULAR; SOFT TISSUE ONCE
Status: DISCONTINUED | OUTPATIENT
Start: 2019-10-07 | End: 2019-10-11

## 2019-10-07 RX ORDER — ACETAMINOPHEN 325 MG/1
650 TABLET ORAL EVERY 6 HOURS PRN
Status: DISCONTINUED | OUTPATIENT
Start: 2019-10-07 | End: 2019-10-14 | Stop reason: HOSPADM

## 2019-10-07 RX ORDER — LISINOPRIL 5 MG/1
5 TABLET ORAL DAILY
Status: DISCONTINUED | OUTPATIENT
Start: 2019-10-08 | End: 2019-10-14 | Stop reason: HOSPADM

## 2019-10-07 RX ADMIN — LISINOPRIL 5 MG: 2.5 TABLET ORAL at 08:02

## 2019-10-07 RX ADMIN — ACETAMINOPHEN 650 MG: 325 TABLET, FILM COATED ORAL at 08:01

## 2019-10-07 RX ADMIN — DULOXETINE HYDROCHLORIDE 60 MG: 30 CAPSULE, DELAYED RELEASE ORAL at 08:02

## 2019-10-07 RX ADMIN — MULTIPLE VITAMINS W/ MINERALS TAB 1 TABLET: TAB at 08:02

## 2019-10-07 RX ADMIN — ACETAMINOPHEN 650 MG: 325 TABLET, FILM COATED ORAL at 20:33

## 2019-10-07 RX ADMIN — FUROSEMIDE 80 MG: 20 TABLET ORAL at 08:02

## 2019-10-07 RX ADMIN — PANTOPRAZOLE SODIUM 40 MG: 40 TABLET, DELAYED RELEASE ORAL at 08:02

## 2019-10-07 RX ADMIN — PANTOPRAZOLE SODIUM 40 MG: 40 TABLET, DELAYED RELEASE ORAL at 17:35

## 2019-10-07 RX ADMIN — THIAMINE HCL TAB 100 MG 100 MG: 100 TAB at 08:02

## 2019-10-07 RX ADMIN — ATORVASTATIN CALCIUM 40 MG: 40 TABLET, FILM COATED ORAL at 20:33

## 2019-10-07 RX ADMIN — WARFARIN SODIUM 10 MG: 5 TABLET ORAL at 17:35

## 2019-10-07 ASSESSMENT — ACTIVITIES OF DAILY LIVING (ADL)
FALL_HISTORY_WITHIN_LAST_SIX_MONTHS: NO
ADLS_ACUITY_SCORE: 13
RETIRED_EATING: 0-->INDEPENDENT
WHICH_OF_THE_ABOVE_FUNCTIONAL_RISKS_HAD_A_RECENT_ONSET_OR_CHANGE?: AMBULATION;TRANSFERRING;TOILETING;BATHING;DRESSING
COGNITION: 0 - NO COGNITION ISSUES REPORTED
ADLS_ACUITY_SCORE: 13
BATHING: 1-->ASSISTIVE EQUIPMENT
DRESS: 0-->INDEPENDENT
SWALLOWING: 0-->SWALLOWS FOODS/LIQUIDS WITHOUT DIFFICULTY
RETIRED_COMMUNICATION: 0-->UNDERSTANDS/COMMUNICATES WITHOUT DIFFICULTY
ADLS_ACUITY_SCORE: 13
TOILETING: 0-->INDEPENDENT
ADLS_ACUITY_SCORE: 13
TRANSFERRING: 0-->INDEPENDENT
AMBULATION: 1-->ASSISTIVE EQUIPMENT

## 2019-10-07 ASSESSMENT — MIFFLIN-ST. JEOR
SCORE: 2336.75
SCORE: 2318.35

## 2019-10-07 NOTE — PLAN OF CARE
Occupational Therapy Discharge Summary    Reason for therapy discharge:    Discharged to acute rehabilitation facility.    Progress towards therapy goal(s). See goals on Care Plan in [facility] electronic health record for goal details.  Goals partially met.  Barriers to achieving goals:   discharge from facility.    Therapy recommendation(s):    Continued therapy is recommended.  Rationale/Recommendations:  Patient would benefit from ongoing therapies to increase ADL/IADL independence and activity tolerance/strength.

## 2019-10-07 NOTE — PLAN OF CARE
D: Admitted 9/9 with a massive PE with PEA arrest. Now s/p mechanical thrombectomy, lytics and VA ECMO. Developed aspiration pneumonia.  Extubated on 9/22. LLL atelectasis vs necrotizing infection seen in on CT on 9/21. small to moderate sized right pleural effusion noted so thoracentesis was completed 10/4.      PMHx most significant for HTN, HLD, CYRUS, obesity and chronic bronchitis      I/A: A/Ox4. SR. Denies pain. R PIV, sl.Pannus with interdry. Right groin skin tear and left groin old ECMO site.Tolerating 2gNa diet. Voiding adequately with diuretics. Up SBA with walker.     P: Continue to monitor and notify [facility] with changes/concerns

## 2019-10-07 NOTE — PHARMACY-ANTICOAGULATION SERVICE
Clinical Pharmacy - Warfarin Dosing Consult     Pharmacy has been consulted to manage this patient s warfarin therapy.  Indication: DVT/PE Prophylaxis  Therapy Goal: INR 2-3  Warfarin Prior to Admission: Yes  Warfarin PTA Regimen: Started in prior hospital stay. Please see flowchart     INR   Date Value Ref Range Status   10/07/2019 2.22 (H) 0.86 - 1.14 Final   10/06/2019 2.10 (H) 0.86 - 1.14 Final       Recommend warfarin 10 mg today.  Pharmacy will monitor Shira Rodriguez daily and order warfarin doses to achieve specified goal.      Please contact pharmacy as soon as possible if the warfarin needs to be held for a procedure or if the warfarin goals change.

## 2019-10-07 NOTE — CONSULTS
[facility] [place]  Consult Note - Hospitalist Service     Date of Admission:  10/8/2019  Consult Requested by: PMR  Reason for Consult: medical co-management    Assessment & Plan   Shira Rodriguez is a 59 year old female with a hx of CYRUS, tobacco abuse, COPD, rheumatoid arthritis, and morbid obesity who was presented to [facility] with massive PE c/b PEA arrest 9/9 and ROSC after intubation/resuscitation. Transferred to [facility] for [facility] ECMO, IR thrombectomy and catheter directed lytics 9/9, decannulated on 9/17 c/b hypercarbic resp failure requiring placement of VV ECMO thru RIJ-FV bypass. S/p VV ECMO decannulation 9/19. Off sedation and extubated 9/22, now on RA. Course c/b afib/aflutter, ECMO cannula wound dehiscence, BL pleural effusions and volume overload 2/2 acute RV failure. Transferred to ARU 10/7 for therapies. Medicine consulted given patients medical complexity.     PEA arrest 2/2 massive PE  Acute hypoxic resp failure - resolved  Small BL pleural effusions, L>R  Necrotic LLL on imaging, atelectasis vs infection  Volume overload 2/2 acute RV failure (now resolved) and ECMO resuscitation  Massive PE c/b PEA arrest 9/9 and ROSC after intubation/resuscitation. Transferred to [facility] for VA ECMO, IR thrombectomy and catheter directed lytics 9/9, decannulated on 9/17 c/b hypercarbic resp failure requiring placement of VV ECMO thru RIJ-FV bypass. S/p VV ECMO decannulation 9/19. Off sedation and extubated 9/22, now on RA at rest, 2-4L with activity and while sleeping.  CT 9/21 showed necrotic LLL, infection vs atelectasis - inpatient team felt infection less likely as clinically stable. Dx thoracentesis 10/4 c/w transudative effusions c/w massive PE and acute heart failure. Has been on diuresis with improvement in wt and breathing. On warfarin with therapeutic INR. Afebrile. O2 sats stable.   - Cont warfarin per pharmacy, goal 2-3. Plan for lifelong tx.   - Cont lasix 80mg po daily  -  Continue prn duonebs  - Cont IS and acapella  - Cont supplemental oxygen with activity to keep sats >90%, 2L while sleeping d/t CYRUS  - Monitor closely for s/sx of infection, low threshold to re-image if concerned about necrotic lung infection  - Repeat CT Chest in 4-6 weeks    Sinus pause. Noted overnight 9/29 while sleeping, 3.6 seconds long. Case was d/w EP, BB stopped, no other intervention needed. She does have CYRUS as below.   - Needs Ziopatch x 14d on discharge  - Follow up with sleep specialist on discharge    Paroxysmal atrial fibrillation/a flutter. Initially noted while critically ill in the ICU and on dobutamine. Dobutamine stopped and pt received amiodarone bolus with successful conversion to NSR. Recurrent aflutter 9/25 requiring amio bolus, IVF, and IV metoprolol x 1 with conversion to NSR. Has been in and out of NSR and Afib since. EP evaluated 9/30, recommend ziopatch at discharge, unable to tolerate BB d/t sinus pause (as above). On warfarin as above. Monitor.     L groin ECMO cannula site wound dehiscence. Staples removed 10/1 with dehiscence shortly after. WOCN to cont to follow. Dressing changes daily as follows: remove old dressing, cleanse with microklenz & pat dry, cut a piece of polymem to fit into the wound bed, cover with 4x4 gauze or ABD.     HTN. BPs soft to normotensive today. Cont lisinopril 5mg daily with hold parameters (was held today), uptitrate as needed.     COPD  Hx of tobacco abuse  Hx of COPD not maintained on any inhalers or nebs prior to admission. Course c/b resp failure 2/2 PE, but there was also concern for possible COPD exacerbation, thus she was treated with a prednisone burst. Hypoxia improving as above, no wheezing on exam. Cont to monitor, will need to follow up with PCP and consider formal PFTs on discharge.     HLD. Cont lipitor.     CYRUS. Dx with CYRUS 4/2019, has not tolerated CPAP at home d/t mask discomfort. Agreeable to using oxygen while sleeping (requiring 2-4L)  to keep sats >88%.     Rheumatoid arthritis. PTA on methotrexate + wellcovorin, being held while inpatient. No acute RA symptoms currently. Ok to resume meds.    Depression. Hx of depression maintained on cymbalta. Initially held while critically ill, now resumed and tolerating without issue. Mood stable. Cont cymbalta 60mg daily.     Pannus wound d/t intertrigo. Improving. WOCN to continue to follow. Cleanse daily, apply xeroform to open wound, cover with abd or exudry.     Deconditioning. Defer to PMR, primary team.    Resolved hospital issues  Ventilator associated pneumonia. Sputum cx 9/11 + MSSA s/p course of vanc + zosyn, then cefazolin thru 9/22. Sputum cx 9/20 + serratia, s/p ceftriaxone 9/23-9/28.   Acute thrombocytopenia. Felt to be 2/2 consumption. HIT ab was not checked. Plt >100. CBC qM/Th.     Nancy Morley PA-C  [facility], [place]    ______________________________________________________________________    Chief Complaint   Deconditioning following admission for massive PE with subsequent PEA arrest and ROSC after intubation/resuscitation that required VA ECMO    History of Present Illness   Shira Rodriguez is a 59 year old female with a hx of CYRUS, tobacco abuse, COPD, rheumatoid arthritis, and morbid obesity who was presented to [facility] with massive PE c/b PEA arrest 9/9 and ROSC after intubation/resuscitation. Transferred to [facility] for VA ECMO, IR thrombectomy and catheter directed lytics 9/9, decannulated on 9/17 c/b hypercarbic resp failure requiring placement of VV ECMO thru RIJ-FV bypass. S/p VV ECMO decannulation 9/19. Off sedation and extubated 9/22, now on RA. Course c/b afib/aflutter, ECMO cannula wound dehiscence, BL pleural effusions and volume overload 2/2 acute RV failure. Transferred to ARU 10/7 for therapies. Medicine consulted given patients medical complexity.    Currently, Shira reports she is doing quite well. She denies any chest pain,  sob, or dyspnea. She reports that she gets tired with walking but is quick to recover. She denies any palpitations. She reports voiding well with lasix, feels her edema is improving. Has not needed oxygen during the day for several days, only while sleeping. She denies any new pain. Tolerating dressing changes to cannula site without issue. No other acute concerns.     Review of Systems   The 10 point Review of Systems is negative other than noted in the HPI or here.     Past Medical History    I have reviewed this patient's medical history and updated it with pertinent information if needed.   Past Medical History:   Diagnosis Date     Chronic bronchitis (H) 07/30/2015     Contact dermatitis      Depressive disorder 1985     Eczema     HANDS     Elevated liver enzymes      H/O total knee replacement, left      History of total hip replacement 10/27/2011     Hyperlipidemia      Hypertension      Morbid obesity (H)      Osteoarthrosis     right knee     Sleep apnea      Tobacco use disorder        Past Surgical History   I have reviewed this patient's surgical history and updated it with pertinent information if needed.  Past Surgical History:   Procedure Laterality Date     ARTHROPLASTY KNEE  6/14/2012    Procedure: ARTHROPLASTY KNEE;  Left Total Knee Arthroplasty;  Surgeon: Annette Quesada MD;  Location: [facility] OR     ARTHROSCOPY HIP Right      BRONCHOSCOPY FLEXIBLE AND RIGID N/A 9/17/2019    Procedure: Bronchoscopy flexible;  Surgeon: Patrick Rayo MD;  Location: [facility] OR     C TOTAL HIP ARTHROPLASTY  07/09/04    RT     CV EXTRACORPERAL MEMBRANE OXYGENATION N/A 9/9/2019    Procedure: Extracorporeal Membrance Oxygenation;  Surgeon: Kaleb Mcfarlane MD;  Location:  [facility]     INSERT EXTRACORPORAL MEMBRANE OXYGENATOR N/A 9/17/2019    Procedure: Venous-Venous cannulation using ultrasound guidance and transesophageal echocardiogram, venous-arterial ecmo decannulation and repair of left femoral  artery;  Surgeon: Patrick Rayo MD;  Location: [facility] OR     IR [facility] THROMB PRIM ART, NON-INTRACRNL  9/10/2019     IR PULMONARY ANGIOGRAM BILATERAL  9/10/2019     IR THROMBOLYSIS ARTERIAL INFUSION INITIAL DAY  9/10/2019       Social History   I have reviewed this patient's social history and updated it with pertinent information if needed.  Social History     Tobacco Use     Smoking status: Current Every Day Smoker     Packs/day: 1.00     Years: 33.00     Pack years: 33.00     Types: Cigarettes     Start date: 1/1/1982     Smokeless tobacco: Never Used     Tobacco comment: maybe   Substance Use Topics     Alcohol use: Yes     Alcohol/week: 0.0 standard drinks     Comment: occassional     Drug use: No       Family History   I have reviewed this patient's family history and updated it with pertinent information if needed.   Family History   Problem Relation Age of Onset     Thyroid Disease Mother      Hypertension Mother      Skin Cancer Mother         MMohs surgery with skin graft     Cerebrovascular Disease Mother         CVA after endarderectomy     Diabetes Mother      Breast Cancer Paternal Grandmother      Pancreatic Cancer Paternal Grandmother      Cardiovascular Paternal Aunt      Cardiovascular Paternal Uncle      Depression Other      Alcoholism Father      Thyroid Disease Sister      Alcoholism Sister      Hypertension Maternal Grandmother      Heart Disease Sister         multiple ablations     Alcoholism Sister      Prostate Cancer Paternal Grandfather        Medications   Lipitor 40mg qPM  Cymbalta 60mg every day  Lasix 80mg every day  Lisinopril 5mg daily  MV daily  Protonix 40mg BID  Thiamine 100mg daily  Warfarin - goal INR 2-3    Allergies   Allergies   Allergen Reactions     Adhesive Tape Blisters     Erythromycin Rash       Physical Exam   Vital Signs:                    Weight: 0 lbs 0 oz  GENERAL: Alert and oriented x 3. NAD. Pleasant and cooperative   HEENT: MMM  CV: RRR, tachycardic with  activity but regular  RESPIRATORY: Effort normal on RA. Lungs CTAB throughout.   GI: Abdomen soft and non distended with normoactive bowel sounds present in all quadrants. No tenderness, rebound, guarding.   NEUROLOGICAL: No focal deficits. Moves all extremities.    EXTREMITIES: trace to 1+ peripheral edema in BLE. Intact bilateral pedal pulses.   SKIN: dressing over cannula site c/d/i

## 2019-10-07 NOTE — PROGRESS NOTES
"  Interventional Cardiology Progress Note    Interval History:  - No acute events overnight  - Remains about net even on PO Lasix  - Patient seen while sitting up chair, denies SOB, on RA, reports SANTOS when ambulating in hallway  - Otherwise, no chest pain, SOB, lightheadedness, dizziness     Most recent vital signs:  /71 (BP Location: Left arm)   Pulse 86   Temp 97.9  F (36.6  C) (Oral)   Resp 16   Ht 1.676 m (5' 5.98\")   Wt (!) 172.2 kg (379 lb 9.6 oz)   SpO2 93%   BMI 61.30 kg/m    Temp:  [97.6  F (36.4  C)-98.1  F (36.7  C)] 97.9  F (36.6  C)  Pulse:  [86-98] 86  Heart Rate:  [85-95] 91  Resp:  [16-20] 16  BP: (110-135)/(61-71) 122/71  SpO2:  [90 %-95 %] 93 %  Wt Readings from Last 2 Encounters:   10/07/19 (!) 172.2 kg (379 lb 9.6 oz)   09/09/19 (!) 178.6 kg (393 lb 11.9 oz)       Intake/Output Summary (Last 24 hours) at 10/7/2019 1040  Last data filed at 10/7/2019 0904  Gross per 24 hour   Intake 1733 ml   Output 1850 ml   Net -117 ml       Physical exam:  General: In bed, in NAD  HEENT: EOMI, PERRLA, no scleral icterus or injection  CARDIAC: RRR, no m/r/g appreciated. Peripheral pulses 2+  RESP: CTAB, no wheezes, rhonchi or crackles appreciated.  GI: NABS, NT/ND, no guarding or rebound   EXTREMITIES: NO LE edema, pulses 2+. Femoral access site currently dressed, soft, non-tender, no oozing around dressing. No bruits appreciated.   SKIN: No acute lesions appreciated  NEURO: Alert and oriented X3, CN II-XII grossly intact, no focal neurological deficits noted    Labs (Past three days):  CBC  Recent Labs   Lab 10/07/19  0716 10/06/19  0702 10/05/19  0521 10/05/19  0041   WBC 5.6 5.3 6.9 8.5   RBC 3.32* 3.30* 3.38* 3.23*   HGB 9.7* 9.7* 9.9* 9.6*   HCT 31.7* 31.6* 32.4* 31.0*   MCV 96 96 96 96   MCH 29.2 29.4 29.3 29.7   MCHC 30.6* 30.7* 30.6* 31.0*   RDW 17.0* 17.2* 17.1* 17.1*   * 115* 158 183     BMP  Recent Labs   Lab 10/07/19  0716 10/06/19  0702 10/05/19  0521 10/04/19  1410 10/04/19  0704 "  10/03/19  0604 10/02/19  0545 10/01/19  0548    138 138  --  137   < > 139 140 142   POTASSIUM 4.0 3.9 4.1  --  4.2   < > 3.8 3.8 3.7   CHLORIDE 106 107 106  --  105   < > 105 106 109   CO2 26 26 25  --  26   < > 25 26 25   ANIONGAP 6 5 8  --  6   < > 9 9 8   GLC 92 93 99  --  97   < > 96 94 94   BUN 19 21 28  --  29   < > 22 18 17   CR 0.71 0.75 0.86 0.82 0.93   < > 0.79 0.76 0.72   GFRESTIMATED >90 87 74 79 68   < > 83 86 >90   GFRESTBLACK >90 >90 86 >90 79   < > >90 >90 >90   MARIA INES 9.3 9.2 9.0  --  9.2   < > 9.1 9.0 9.4   MAG 2.2 2.0 2.0  --  2.0   < > 2.1 2.2 1.9   PHOS  --   --   --   --  4.2  --  4.3 4.4 3.9    < > = values in this interval not displayed.     Troponins:   Lab Results   Component Value Date    TROPI <0.015 09/23/2019    TROPI <0.015 09/21/2019    TROPI <0.015 09/20/2019    TROPI <0.015 09/20/2019    TROPI <0.015 09/20/2019        INR  Recent Labs   Lab 10/07/19  0716 10/06/19  0702 10/05/19  0521 10/04/19  1410   INR 2.22* 2.10* 1.78* 1.91*     Liver panel  Recent Labs   Lab 10/07/19  0716 10/06/19  0702 10/05/19  0521 10/04/19  0704   PROTTOTAL 7.4 7.1 6.8 7.3   ALBUMIN 2.8* 2.7* 2.8* 3.0*   BILITOTAL 0.3 0.4 0.4 0.3   ALKPHOS 67 68 65 75   AST 14 15 12 9   ALT 19 18 18 22       Imaging/procedure results:  Cxray 10/5/19  Impression:   1. Small bilateral pleural effusions with associated atelectasis,  unchanged.  2. Cardiomegaly and pulmonary vascular congestion unchanged.    Assessment & Plan:  Shira Rodriguez is a 59 F with a history of hypertension, hyperlipidemia, sleep apnea, morbid obesity, and chronic bronchitis who presented to [facility] 9/9 with massive PE with subsequent PEA arrest and ROSC after intubation/resuscitation, transferred for VA ECMO.      # Acute Hypoxemic Respiratory Failure, improved  # Bilateral Plueral Effusions, small L>R  # Nectrotic Left Lower Lobe on prior imaging, infection versus atelectasis  # Volume Overload, likely 2/2 acute RV failure (resolved) and  ECMO resuscitation   She still intermittently requiring some oxygen, 2-4L with activity/sleep. This is likely mutli-factorial and due to her pulmonary edema, her effusion, her obesity, and significant deconditioning. Patient treated for aspiration PNA earlier in course. CT 9/21 with possible necrotizing LLL infection versus complete atelectasis. Patient has remained afebrile without leukocytosis and clinically improving since 9/21 arguing against a necrotizing infection. We did do a diagnostic tap, likely transudate pleural effusion 2/2 acute PH with her acute massive PE, acute heart failure (gradually improving), and deconditioning due to prolonged ICU stay with hypoalbuminemia.  Her estimated dry weight is unclear and she is a difficult exam but did receive copious fluid resuscitation during her ICU stay, now diuresed approximately 30 lbs with gradual improvement in her oxygenation. Appears euvolemic today, now on PO diuretics. Serial CXRs showed pulmonary edema, albeit improving as compared to previous exams, as well as a persistent L>R small pleural effusion. She remained on IV diuresis though 10/4 when she had slight bump in creat. Thus transitioned to oral lasix. Monitoring I/O closely. She will continue to need oral diuresis and pulmonary rehab with incentive spirometry.   - Lasix 80 mg PO daily   - IS per nursing      # PEA arrest secondary to massive pulmonary embolism  # Hypoxic respiratory failure - improving  # Volume overload - improving  Transferred to [facility] for VA ECMO, and IR thrombectomy and catheter directed lytics on 9/9. Went for VA ECMO decannulation on 9/17 c/b hypercarbic respiratory failure requiring placement of VV ECMO through RIJ-FV bypass. VV ECMO was decannulated on 9/19. The patient has been off sedation since 9/22, and was extubated 9/22. Repeat CT with no residual clot in PA on 09/21, but necrotic LLL as above. RV function improved to normal. INR theraputic as of 09/30/2019.  Please  "see above discussion re: fluid.  Left groin sutures removed 10/01, left groin wound re-opened and packed. WOC consult placed.  - Coumadin with goal INR 2-3. INR 2.22 today. Would recommend lifelong.   - Duonebs PRN; Acapella qid   - Wean oxygen as able  - IS      #Pannus wound (right side and right groin)  Patient with dehisced left groin cannula wound, staples removed 10/1 with dehiscence shortly after. WOC following, appreciate their recs:     Daily    1. Cleanse with gentle soap and water  2. Apply Xeroform (#[id number]) to open wound only  3. Cover with ABD if needed for drainage       L) groin- Remove old dressing very gently.    Cleanse with Microklenz and pat dry    Cut a piece of polymem (#[id number]) to fit into the wound bed and place it. Ensure to cover entire wound bed.    Cover with 4x4 gauze or ABD depending upon drainage.    Change dressing every day.       Abdominal pannus only around intact skin-Cleanse the area and dry thoroughly daily.    Cut a piece of interdry (#[id number]) and place it as a single layer and date it.     Ensure to leave at least 2\" of tail beyond the fold to promote moisture wicking.    May leave same interdry for 5 days if not soiled.  DO not use this product with any other creams or powder.     # Sinus Pause  Noted overnight 09/29 while sleeping; length of 3.6 seconds.  Discussed with EP; no need for acute intervention. Will stop BB and plan to discharge the patient on monitor. Per patient, with previous sleep study she was also noted to have a significant pause while on CPAP. She has a CPAP at home but tolerates it only for an hour or two, she also notes that she has tried different face masks.   - Ziopatch x14 days at discharge  - EP consult; appreciate formal recommendations  - Follow up with sleep specialist after discharge to home      # Acute Thrombocytopenia (resolved)   Thrombocytopenia 60-80k baseline 180k. Had precipitous drop in platelets after admission - suspected due to " consumption. 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 Has stabilized   - CBC daily     # Afib/Aflutter  Noted to be in atrial fibrillation while in the ICU and on dobutamine, which was thought to be exacerbating factor. Dobutamine discontinued 9/23 and patient was given amiodarone bolus at that time. She converted to sinus. On 9/25 she went into atrial flutter with variable block. Again was given an amio bolus, fluid bolus, and IV metop x1. Continues to intermittently go into a-fib - rates now controlled. Has been in sinus rhythm last several days.  Seen by EP 09/30 given sinus pause; see recommendations as above  - holding BB due to sinus pause  - warfarin as above     #COPD  # History of tobacco abuse  # Aspiration pneumonia - resolved  Sputum cultures:  - 9/11 MSSA: vancomycin/zosyn x5 days for ECMO, vanc last dose 9/15, cefazolin on 9/17-9/22  - 9/20 Serratia (ceftriaxone susceptible): Ceftriaxone 9/23-9/28   - s/p prednisone burst     # History of depression  Cymbalta held while in ICU. Will resume at lower dose over next few days, and increase to PTA dose. Additionally reports that she may have been on Wellbutrin, however, notes regarding this drug state she had increased crying episodes while on this medication and it appears to have been stopped.  - up titrated to home 60mg Cymbalta      # HTN   BP stable, 110-120/60-70's  - Continue 5mg lisinopril, can be titrated as OP  - continue to hold PTA spironolactone     # HLD  - Atorvastatin 40mg     # CYRUS   Patient diagnosed with sleep apnea, but refuses to use BiPAP/CPAP at home due to mask discomfort.  - Oxygen at night PRN to keep O2 sats >88%.      # Rheumatoid Arthritis  - holding PTA methotrexate/Wellcovorin while inpatient    Diet: Cardiac  Activity: Up as tolerated with assist  Code Status: Full  Disposition: Medically ready for discharge, discharge to ARU pending bed availability     Patient seen and discussed w/ Dr. Stuart.      Savita  OK Collier, CNP  [facility]  Interventional Cardiology  [phone]

## 2019-10-07 NOTE — H&P
"    [facility]   Acute Rehabilitation Unit  Admission History and Physical    CHIEF COMPLAINT   \"very weak\"     HISTORY OF PRESENT ILLNESS  Shira Rodriguez is a 59 year old woman with a past medical history of hypertension, hyperlipidemia, sleep apnea, RA, depression, morbid obesity, and chronic bronchitis who called ems due to progressive worsening sob.  Was admitted and intubated for airway protection, shortly after became bradycardic and had PEA arrest. Echo with severely dilated RV/severe RV dysfunction. CT revealed extensive bilateral pulmonary embolism.  Transferred to [facility] for ECMO 9/9/2019, and underwent ECMO and mechanical thrombectomy. Underwent IR catheterization s/p suction thrombectomy of central R pulmonary thrombus, no significant removal from L pulmonary artery. S/p bilateral infusion catheter placement.    Course complicated by pleural effusions, hypoxic respiratory failure, bradycardia, Paroxysmal A-fib, aspiration pneumonia, hypervolemia, and pannus wounds.   Was seen and managed by cardiology, with consults from pulmonology, neurology, radiology and palliative care.  In addition to medical complexity patient noted to have impaired activity tolerance, strength, and balance.      During his acute hospitalization, patient was seen and evaluated by PT and OT, who collectively recommended that patient would benefit from ongoing therapies in the acute inpatient rehabilitation setting.       In review of the therapy notes patient able to complete lower body dressing with SBA, transfers with stand by assist.  Ambulating up to 80 feet.  Is requiring max assist with bathing (distal le and back/bottom) Therapist feel would benefit from ongoing therapy to maximize independence and endurance.     Upon admission to ARU, she reported ongoing fatigue and poor endurance. She also has exertional dyspnea which is improving. Some constipation at baseline and normally " has Bm every 2-3 days. No issues with bladder function. Reported generalized weakness but no paresthesia. Sleep is interrupted due to nocturia. Has pain at her sternum due to resuscitation and some mild chronic low back pain. She has h/o L TKA and R TAWNY; intermittent R knee pain due to OA but not significant.     She has dealt with depression all life; is closely followed by PCP, psychology and psychiatry teams. Mood has been stable since hospitalization. She reported some anxiety about more complications but her symptoms are overall under control.       PAST MEDICAL HISTORY   Reviewed and updated in Epic.  Past Medical History:   Diagnosis Date     Chronic bronchitis (H) 07/30/2015     Contact dermatitis      Depressive disorder 1985     Eczema     HANDS     Elevated liver enzymes      H/O total knee replacement, left      History of total hip replacement 10/27/2011     Hyperlipidemia      Hypertension      Morbid obesity (H)      Osteoarthrosis     right knee     Sleep apnea      Tobacco use disorder        SURGICAL HISTORY  Reviewed and updated in Epic.  Past Surgical History:   Procedure Laterality Date     ARTHROPLASTY KNEE  6/14/2012    Procedure: ARTHROPLASTY KNEE;  Left Total Knee Arthroplasty;  Surgeon: Annette Quesada MD;  Location: [facility] OR     ARTHROSCOPY HIP Right      BRONCHOSCOPY FLEXIBLE AND RIGID N/A 9/17/2019    Procedure: Bronchoscopy flexible;  Surgeon: Patrick Rayo MD;  Location:  OR      TOTAL HIP ARTHROPLASTY  07/09/04    RT     CV EXTRACORPERAL MEMBRANE OXYGENATION N/A 9/9/2019    Procedure: Extracorporeal Membrance Oxygenation;  Surgeon: Kaleb Mcfarlane MD;  Location:  HEART CARDIAC CATH LAB     INSERT EXTRACORPORAL MEMBRANE OXYGENATOR N/A 9/17/2019    Procedure: Venous-Venous cannulation using ultrasound guidance and transesophageal echocardiogram, venous-arterial ecmo decannulation and repair of left femoral artery;  Surgeon: Ptarick Rayo MD;  Location:  OR      IR MECH THROMB PRIM ART, NON-INTRACRNL  9/10/2019     IR PULMONARY ANGIOGRAM BILATERAL  9/10/2019     IR THROMBOLYSIS ARTERIAL INFUSION INITIAL DAY  9/10/2019       SOCIAL HISTORY  Marital Status: single   Living situation: lives alone in an apartment in [place] with elevator access.  Family support: patient's sister is POA and lives 2 minutes from her house. Her mother also lives with her sister and can help as needed.   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   Tobacco use: prior to admission smoker- 1 ppd for 30+ years  Alcohol use: occasional alcohol use  Illicit drug use: no      FAMILY HISTORY  Reviewed and updated in Epic.  Family History   Problem Relation Age of Onset     Thyroid Disease Mother      Hypertension Mother      Skin Cancer Mother         MMohs surgery with skin graft     Cerebrovascular Disease Mother         CVA after endarderectomy     Diabetes Mother      Breast Cancer Paternal Grandmother      Pancreatic Cancer Paternal Grandmother      Cardiovascular Paternal Aunt      Cardiovascular Paternal Uncle      Depression Other      Alcoholism Father      Thyroid Disease Sister      Alcoholism Sister      Hypertension Maternal Grandmother      Heart Disease Sister         multiple ablations     Alcoholism Sister      Prostate Cancer Paternal Grandfather          PRIOR FUNCTIONAL HISTORY   She started using a FWW during the summer to help with endurance and low back pain. Ordered most things online and had difficulty with her fatigue and endurance. Drives. Was managing all iADLs.     MEDICATIONS  Scheduled meds  Facility-Administered Medications Prior to Admission   Medication Dose Route Frequency Provider Last Rate Last Dose     methylPREDNISolone (DEPO-MEDROL) injection 40 mg  40 mg INTRA-ARTICULAR Once Mari Collier CNP         Medications Prior to Admission   Medication Sig Dispense Refill Last  "Dose     acetylcysteine (MUCOMYST) 20 % neb solution Take 2 mLs by nebulization every 4 hours as needed for mucolysis/respiratory distress        atorvastatin (LIPITOR) 40 MG tablet Take 1 tablet (40 mg) by mouth every evening        cyclobenzaprine (FLEXERIL) 10 MG tablet Take 1 tablet (10 mg) by mouth nightly as needed for muscle spasms 30 tablet 1      DULoxetine (CYMBALTA) 60 MG EC capsule TK ONE C PO  QAM  0 8/11/2019 at Unknown time     folic acid (FOLVITE) 1 MG tablet Take 4 tablets (4 mg) by mouth daily Take 3-4 tablets daily 360 tablet 3      [START ON 10/8/2019] furosemide (LASIX) 80 MG tablet Take 1 tablet (80 mg) by mouth daily        insulin syringe-needle U-100 (BD INSULIN SYRINGE ULTRAFINE) 30G X 1/2\" 1 ML For methotrexate use weekly 8 each prn Taking     leucovorin (WELLCOVORIN) 5 MG tablet Take 1 tablet (5 mg) by mouth every 7 days *take 24h after taking weekly methotrexate dose 4 tablet 3      [START ON 10/8/2019] lisinopril (PRINIVIL/ZESTRIL) 5 MG tablet Take 1.5 tablets (7.5 mg) by mouth daily        methotrexate 50 MG/2ML injection CHEMO Inject 1 mL (25 mg) Subcutaneous every 7 days Inject 0.8 mL (20 mg) weekly 4 mL 6      methylPREDNISolone (MEDROL DOSEPAK) 4 MG tablet therapy pack Follow package instructions 21 tablet 0      [START ON 10/8/2019] multivitamin w/minerals (THERA-VIT-M) tablet 1 tablet by Oral or Feeding Tube route daily        nystatin (MYCOSTATIN) 667243 UNIT/GM external powder Apply to coat rash tid, neck and breast area rashes 60 g 3      pantoprazole (PROTONIX) 40 MG EC tablet Take 1 tablet (40 mg) by mouth 2 times daily (before meals)        varenicline (CHANTIX STARTING MONTH TANYA) 0.5 MG X 11 & 1 MG X 42 tablet Take 0.5 mg by mouth daily AND 0.5 mg 2 times daily AND 1 mg 2 times daily. 53 tablet 0      [START ON 10/8/2019] vitamin B1 (THIAMINE) 100 MG tablet 1 tablet (100 mg) by Oral or Feeding Tube route daily        warfarin ANTICOAGULANT (COUMADIN) 5 MG tablet Take 1 " "tablet (5 mg) by mouth daily 30 tablet         ALLERGIES     Allergies   Allergen Reactions     Adhesive Tape Blisters     Erythromycin Rash         REVIEW OF SYSTEMS  A 10 point ROS was performed and negative unless otherwise noted in HPI.     PHYSICAL EXAM  VITAL SIGNS:  /50 (BP Location: Right arm)   Pulse 92   Temp 96.7  F (35.9  C) (Oral)   Resp 18   Ht 1.707 m (5' 7.2\")   Wt (!) 172.1 kg (379 lb 6.4 oz)   SpO2 94%   BMI 59.07 kg/m    BMI:  Estimated body mass index is 59.07 kg/m  as calculated from the following:    Height as of this encounter: 1.707 m (5' 7.2\").    Weight as of this encounter: 172.1 kg (379 lb 6.4 oz).     General: NAD, sitting in chair   HEENT: central line site at the right neck area closed with scab in place   Pulmonary: clear breath sounds b/l   Cardiovascular: RRR - Ziopatch in place  Abdominal: obese, soft and non-tender   Extremities: wwp, + edema in bilateral lower extremities, chronic discoloration at anterior leg areas b/l. No tenderness at calvse    MSK/neuro:   Mental Status:  alert and oriented x3    Cranial Nerves: grossly normal    Sensory: Normal to light touch in bilateral upper and lower extremities    Strength: bilateral upper extremities with 4+/5, HF 4-/5 and limited by body habitus. KF/DF/EHL 4/5.  Reflexes: Present and symmetrical at bilateral biceps, brachioradialis, and knees. Unable to elicit at ankles   Abnormal movements: None    Coordination: No dysmetria on finger to nose b/l    Speech: clear and coherent    Cognition: able to remember all the details of her medical history     Skin: wound were not examined today      LABS  Lab Results   Component Value Date    WBC 5.6 10/07/2019     Lab Results   Component Value Date    RBC 3.32 10/07/2019     Lab Results   Component Value Date    HGB 9.7 10/07/2019     Lab Results   Component Value Date    HCT 31.7 10/07/2019     Lab Results   Component Value Date    MCV 96 10/07/2019     Lab Results   Component " Value Date    MCH 29.2 10/07/2019     Lab Results   Component Value Date    MCHC 30.6 10/07/2019     Lab Results   Component Value Date    RDW 17.0 10/07/2019     Lab Results   Component Value Date     10/07/2019     Last Comprehensive Metabolic Panel:  Sodium   Date Value Ref Range Status   10/07/2019 138 133 - 144 mmol/L Final     Potassium   Date Value Ref Range Status   10/07/2019 4.0 3.4 - 5.3 mmol/L Final     Chloride   Date Value Ref Range Status   10/07/2019 106 94 - 109 mmol/L Final     Carbon Dioxide   Date Value Ref Range Status   10/07/2019 26 20 - 32 mmol/L Final     Anion Gap   Date Value Ref Range Status   10/07/2019 6 3 - 14 mmol/L Final     Glucose   Date Value Ref Range Status   10/07/2019 92 70 - 99 mg/dL Final     Urea Nitrogen   Date Value Ref Range Status   10/07/2019 19 7 - 30 mg/dL Final     Creatinine   Date Value Ref Range Status   10/07/2019 0.71 0.52 - 1.04 mg/dL Final     GFR Estimate   Date Value Ref Range Status   10/07/2019 >90 >60 mL/min/[1.73_m2] Final     Comment:     Non  GFR Calc  Starting 12/18/2018, serum creatinine based estimated GFR (eGFR) will be   calculated using the Chronic Kidney Disease Epidemiology Collaboration   (CKD-EPI) equation.       Calcium   Date Value Ref Range Status   10/07/2019 9.3 8.5 - 10.1 mg/dL Final           IMPRESSION/PLAN:  Shira Rodriguez is a 59 year old woman who presented [facility] 9/9 via EMS with acute respiratory failure was intubated developed PEA arrest, found to have massive pulmonary embolism, echo with RV failure. Admitted to [facility] for ECMO and thrombectomy, course complicated by hypoxic respiratory failure, pleural effusions, pneumonia, thrombocytopenia, hypervolemia, and deconditioning. Admitted to acute rehab 10/7 for ongoing rehabilitation and medical management.     Past medical history significant for HTN, HLD, sleep apnea, morbid obesity, Rheumatoid Arthritis, Depression, and chronic  bronchitis.    Admission to acute inpatient rehab PEA arrest 2/2 PE  Impairment group code: 09      1. PT, OT 90 minutes of each on a daily basis, in addition to rehab nursing and close management of physiatrist.      2. Impairment of ADL's: noted to have impaired strength, activity tolerance currently sba with transfers.  Was max assist for washing back/bottow and distal le in shower.  Max assist for donning socks. MoCA 24/30 with mild cognitive deficits. With recommendation for ongoing OT cognitive assessment.     3. Impairment of mobility:  Currently ambulating up to 80 feet with FWW and sba with intermittent oxygen use and need for seated rest breaks due to fatigue.  CGA for toilet transfers.      4. Impairment of cognition/language/swallow:  OT to further evaluate cognition involve slp if indicated. On regular diet.     5. Medical Conditions -hospitalist team following  # PEA Arrest 2/2 massive pulmonary embolism-  # Acute Hypoxemic Respiratory Failure, improved- s/p VA ECMO IR thrombectomy 9/9 decannulated 9/17 with placement of VV ECMO 9/17-9/19   - respiratory status further complicated by  hx Chronic Bronchitis, Bilateral pleural effusions, hypervolemia, PNA (completed treatment).  -taper oxygen goal >88%  -encourage IS      # Bilateral Plueral Effusions, small L>R  # Volume Overload, likely 2/2 acute RV failure (resolved) and ECMO resuscitation, hypoalbumineam  -daily weights  -continue lasix per hospitalist titration  -monitor edema/respiratory status  -lymphedema wraps as needed.    # Sinus Pause- 3.6 seconds on telemetry 9/29 while patient sleeping.  Discussed with EP  BB discontinued, felt may be related to apnea  -optimize CYRUS tx as outpatient.    - Ziopatch x14 days at discharge  -f/u Dr. Valadez- EP     # Afib/Aflutter- Noted to be in atrial fibrillation while in the ICU, felt possibly 2/2 to Dobutamine which was discontinued 9/23 and patient was given amiodarone bolus at that time. She converted to  sinus. 9/25 she went into atrial flutter with variable block. Again was given an amio bolus, fluid bolus, and IV metop x1. Continues to intermittently go into a-fib - rates now controlled.   - holding BB due to sinus pause as above.  - warfarin as above  -event monitor and EP f/u as above.    # HTN   BP stable, 110-120/60-70's  - Lasix 80 mg daily as listed above  - Continue 5mg lisinopril, can be titrated as OP    #Sleep Apnea- with inability to tolerate CPAP in the past.   -monitor oxygen saturations as above  -consider overnight oximetry study prior to discharge   -should f/u in sleep medicine clinic as outpatient      # COPD- chronic bronchitis with tobacco abuse ~ 1 ppd prior to admission    # History of depression- long standing; was followed by psychology and psychiatry teams prior to admission.   - continue Cymbalta (dose increased during hospital stay)     # HLD  - continue Atorvastatin 40mg     # Rheumatoid Arthritis- followed by Dr. Austin; diagnosed about 1.5 years ago.   - Resume PTA methotrexate/Wellcovorin   - Continue flexeril and tylenol prn    # Pannus Wound-  wound care as ordered.  # Wound at the left ECMO site: WOCN to follow.       6. FEN: low na  7. Bowel: chronic constipation at baseline   8. Bladder: continent; will check PVRs  9. DVT Prophylaxis: warfarin  10. GI Prophylaxis: protonix  11. Code:  Full - confirmed upon admission to ARU  12. Disposition: home  13. ELOS:  One week  Follow up Appointments on Discharge: PCP, Cardiology, Electrophysiology, Sleep medicine      Chayito Cardona MD  Physical Medicine & Rehabilitation     Time Spent on this Encounter   I spent a total of 65 minutes face to face and coordinating care of Shira Rodriguez.  Over 50% of my time on the unit was spent counseling the patient and /or coordinating care; see note for details.

## 2019-10-07 NOTE — PLAN OF CARE
OT/6C:  Discharge Planner OT   Patient plan for discharge: ARU.   Current status: Patient completes LB dressing with SBA. Supervision to complete toileting tasks. Completes all transfers SBA during session. To promote increased aerobic endurance for return to ADL/IADL routines, patient ambulates in hallway 3 x 80 feet. Seated rest breaks between ambulation bouts with wheelchair follow. O2 >91% on RA, though HR up to high 120's when spot checked. Per RN, reading up to 130's on monitor. SBP elevated to 140's after activity. RN notified.   Barriers to return to prior living situation: Medical Status, Deconditioning, Strength, Higher Level Balance  Recommendations for discharge: Rehab  Rationale for recommendations: Patient would benefit from ongoing therapies to increase ADL/IADL independence and tolerance prior to return home. Is motivated to participate; anticipate would tolerate 3 hours of therapy per day if discharges to ARU.        Entered by: Mari Howard 10/07/2019 2:08 PM

## 2019-10-07 NOTE — PROGRESS NOTES
Social Work Services Discharge Note      Patient Name:  Shira Rodriguez     Anticipated Discharge Date: 10/07/19 @ 1500    Discharge Disposition:   Facility: [facility]  [phone]  Nurse to Nurse Call: PH: [phone]    Following MD: Anjel Busby  [address] [facility] [address]     Pre-Admission Screening (PAS) online form has been completed.  The Level of Care (LOC) is:  Determined  Confirmation Code is:  NA  Patient/caregiver informed of referral to [facility] Line for Pre-Admission Screening for skilled nursing facility (SNF) placement and to expect a phone call post discharge from SNF.     Additional Services/Equipment Arranged:    - Transportation: [employer] (PH: [phone]) stretcher scheduled for 1500.  Indication for stretcher transport is bariatric needs.  Pt and family are aware and in agreement that insurance may not cover stretcher transport: NA - covered through [facility] transfer  PCS form completed prior to transport.     Patient / Family response to discharge plan:  Pt in agreement with the plan.     Persons notified of above discharge plan:  Pt, bedside RN, CSI team, PCP, SNF admissions.    Staff Discharge Instructions:  Please fax discharge orders and signed hard scripts for any controlled substances.  Please print a packet and send with patient.     CTS Handoff completed:  YES    Medicare Notice of Rights provided to the patient/family:  NA - has private insurance    ABHISHEK Christianson, SAPNA  6C Unit   Phone: [phone]  Pager: [phone]  Unit: [phone]

## 2019-10-08 LAB
COPATH REPORT: NORMAL
INR PPP: 2.5 (ref 0.86–1.14)

## 2019-10-08 PROCEDURE — 85610 PROTHROMBIN TIME: CPT | Performed by: PHYSICIAN ASSISTANT

## 2019-10-08 PROCEDURE — 25000132 ZZH RX MED GY IP 250 OP 250 PS 637: Performed by: PHYSICIAN ASSISTANT

## 2019-10-08 PROCEDURE — 97166 OT EVAL MOD COMPLEX 45 MIN: CPT | Mod: GO

## 2019-10-08 PROCEDURE — 12800006 ZZH R&B REHAB

## 2019-10-08 PROCEDURE — 99207 ZZC CONSULT E&M CHANGED TO INITIAL LEVEL: CPT | Performed by: PHYSICIAN ASSISTANT

## 2019-10-08 PROCEDURE — 25000132 ZZH RX MED GY IP 250 OP 250 PS 637: Performed by: PHYSICAL MEDICINE & REHABILITATION

## 2019-10-08 PROCEDURE — 97110 THERAPEUTIC EXERCISES: CPT | Mod: GP

## 2019-10-08 PROCEDURE — 97602 WOUND(S) CARE NON-SELECTIVE: CPT

## 2019-10-08 PROCEDURE — G0463 HOSPITAL OUTPT CLINIC VISIT: HCPCS

## 2019-10-08 PROCEDURE — 36415 COLL VENOUS BLD VENIPUNCTURE: CPT | Performed by: PHYSICIAN ASSISTANT

## 2019-10-08 PROCEDURE — 97535 SELF CARE MNGMENT TRAINING: CPT | Mod: GO

## 2019-10-08 PROCEDURE — 97530 THERAPEUTIC ACTIVITIES: CPT | Mod: GP

## 2019-10-08 PROCEDURE — 99221 1ST HOSP IP/OBS SF/LOW 40: CPT | Performed by: PHYSICIAN ASSISTANT

## 2019-10-08 PROCEDURE — 97163 PT EVAL HIGH COMPLEX 45 MIN: CPT | Mod: GP

## 2019-10-08 RX ORDER — METHOTREXATE 25 MG/ML
25 INJECTION, SOLUTION INTRA-ARTERIAL; INTRAMUSCULAR; INTRATHECAL; INTRAVENOUS
Status: DISCONTINUED | OUTPATIENT
Start: 2019-10-09 | End: 2019-10-14 | Stop reason: HOSPADM

## 2019-10-08 RX ORDER — FOLIC ACID 1 MG/1
4 TABLET ORAL DAILY
Status: DISCONTINUED | OUTPATIENT
Start: 2019-10-09 | End: 2019-10-14 | Stop reason: HOSPADM

## 2019-10-08 RX ORDER — LEUCOVORIN CALCIUM 5 MG/1
5 TABLET ORAL
Status: DISCONTINUED | OUTPATIENT
Start: 2019-10-10 | End: 2019-10-14 | Stop reason: HOSPADM

## 2019-10-08 RX ORDER — METHOTREXATE 25 MG/ML
25 INJECTION, SOLUTION INTRA-ARTERIAL; INTRAMUSCULAR; INTRATHECAL; INTRAVENOUS ONCE
Status: DISCONTINUED | OUTPATIENT
Start: 2019-10-09 | End: 2019-10-08

## 2019-10-08 RX ADMIN — RANITIDINE 150 MG: 150 TABLET ORAL at 20:24

## 2019-10-08 RX ADMIN — WARFARIN SODIUM 7.5 MG: 2.5 TABLET ORAL at 17:29

## 2019-10-08 RX ADMIN — FUROSEMIDE 80 MG: 40 TABLET ORAL at 11:30

## 2019-10-08 RX ADMIN — ATORVASTATIN CALCIUM 40 MG: 40 TABLET, FILM COATED ORAL at 20:24

## 2019-10-08 RX ADMIN — THIAMINE HCL TAB 100 MG 100 MG: 100 TAB at 08:02

## 2019-10-08 RX ADMIN — DULOXETINE HYDROCHLORIDE 60 MG: 60 CAPSULE, DELAYED RELEASE ORAL at 08:02

## 2019-10-08 RX ADMIN — PANTOPRAZOLE SODIUM 40 MG: 40 TABLET, DELAYED RELEASE ORAL at 10:22

## 2019-10-08 RX ADMIN — MULTIPLE VITAMINS W/ MINERALS TAB 1 TABLET: TAB at 08:02

## 2019-10-08 RX ADMIN — ACETAMINOPHEN 650 MG: 325 TABLET, FILM COATED ORAL at 08:37

## 2019-10-08 ASSESSMENT — MIFFLIN-ST. JEOR: SCORE: 2325.86

## 2019-10-08 ASSESSMENT — ACTIVITIES OF DAILY LIVING (ADL): PREVIOUS_RESPONSIBILITIES: MEAL PREP;HOUSEKEEPING;LAUNDRY;SHOPPING;MEDICATION MANAGEMENT;FINANCES;DRIVING

## 2019-10-08 NOTE — PHARMACY-MEDICATION REGIMEN REVIEW
Pharmacy Medication Regimen Review  Shira Rodriguez is a 59 year old female who is currently in the Acute Rehab Unit.    Assessment: Upon review of the medications and patient chart the following irregularities were found:     Other Recommendations: Pt takes methotrexate, folic acid, and leucovorin for RA. These are not yet ordered.    Plan:   Please order methotrexate, folic acid, and leucovorin when ready to resume RA treatment.    Attending provider will be sent this note for review.  If there are any emergent issues noted above, pharmacist will contact provider directly by phone.      Pharmacy will periodically review the resident's medication regimen for any PRN medications not administered in > 72 hours and discontinue them. The pharmacist will discuss gradual dose reductions of psychopharmacologic medications with interdisciplinary team on a regular basis.    Please contact pharmacy if the above does not answer specific medication questions/concerns.    Background:  A pharmacist has reviewed all medications and pertinent medical history today.  Medications were reviewed for appropriate use and any irregularities found are listed with recommendations.      Current Facility-Administered Medications:      acetaminophen (TYLENOL) tablet 650 mg, 650 mg, Oral, Q6H PRN, Jessie Anderson MD, 650 mg at 10/08/19 0837     atorvastatin (LIPITOR) tablet 40 mg, 40 mg, Oral, QPM, Nancy Morley PA-FLOWER, 40 mg at 10/07/19 2033     cyclobenzaprine (FLEXERIL) tablet 10 mg, 10 mg, Oral, At Bedtime PRN, Nancy Morley PA-FLOWER     DULoxetine (CYMBALTA) DR capsule 60 mg, 60 mg, Oral, Daily, Nancy Morley PA-C, 60 mg at 10/08/19 0802     furosemide (LASIX) tablet 80 mg, 80 mg, Oral, Daily, Nancy Morley PA-C     lisinopril (PRINIVIL/ZESTRIL) tablet 5 mg, 5 mg, Oral, Daily, Nancy Morley PA-FLOWER     multivitamin w/minerals (THERA-VIT-M) tablet 1 tablet, 1 tablet, Oral or  Feeding Tube, Daily, Nancy Morley PA-C, 1 tablet at 10/08/19 0802     pantoprazole (PROTONIX) EC tablet 40 mg, 40 mg, Oral, BID AC, Nancy Morley PA-C, 40 mg at 10/07/19 1735     Patient is already receiving anticoagulation with heparin, enoxaparin (LOVENOX), warfarin (COUMADIN)  or other anticoagulant medication, , Does not apply, Continuous PRN, Carli Salas PA     vitamin B1 (THIAMINE) tablet 100 mg, 100 mg, Oral or Feeding Tube, Daily, Nancy Morley PA-C, 100 mg at 10/08/19 0802     warfarin ANTICOAGULANT (COUMADIN) tablet 7.5 mg, 7.5 mg, Oral, ONCE at 18:00, Chayito Cardona MD     Warfarin Therapy Reminder (Check START DATE - warfarin may be starting in the FUTURE), 1 each, Does not apply, Continuous PRN, Nancy Morley PA-C  No current outpatient prescriptions on file.   PMH:  # PEA Arrest 2/2 massive pulmonary embolism-  # Acute Hypoxemic Respiratory Failure, improved- s/p    # Bilateral Plueral Effusions, small L>R  # Volume Overload  # Sinus Pause  # Afib/Aflutter  # HTN   #Sleep Apnea  # COPD  # History of depression  # HLD  # Rheumatoid Arthritis  # Pannus Wound

## 2019-10-08 NOTE — PROGRESS NOTES
"WO Nurse Inpatient Wound Assessment   Reason for consultation: Evaluate and treat Pannus wound   New- 10/3- L) groin site    Assessment  Pannus wound due to Intertrigo  Status: healing    Left Groin: slough in wound bed decreasing    Treatment Plan  Pannus wounds (right side and left groin): Daily     Cleanse  Both sites with Microklenz and pat dry    Left groin: Cut a piece of polymem (#[id number]) to fit into the wound bed and place it. Ensure to cover entire wound bed.    Cover with 4x4 gauze or ABD depending upon drainage.    R panus fold:  Place single layer Interdry into all skin folds with 2\" protruding from fold  .    Orders Reviewed  Recommended provider order: NA  WOC Nurse follow-up plan:weekly  Nursing to notify the Provider(s) and re-consult the WOC Nurse if wound(s) deteriorates or new skin concern.    Patient History  According to provider note(s):  Shira Rodriguez is a 59 F with a history of hypertension, hyperlipidemia, sleep apnea, morbid obesity, and chronic bronchitis who presented to [facility] with massive PE with following PEA arrest 9/9 and ROSC after intubation/resusitation. Transferred here for [facility] ECMO thrombectomy and catheter directed lytics on 9/9, went for [facility] ECMO decannulation on 9/17 complicated by hypercarbic respiratory failure s/p VV ECMO through RIJ-FV bypass s/p decannulation on 9/19. Repeat CT with no residual clot in PA. RV function improved to normal. She is being bridged to a therapeutic INR and has minimal oxygen requirements at this stage.     Objective Data  Containment of urine/stool: Incontinence Protocol    Active Diet Order  Orders Placed This Encounter      Combination Diet 2 gm NA Diet      Output:   I/O last 3 completed shifts:  In: -   Out: 850 [Urine:850]    Risk Assessment:   Sensory Perception: 3-->slightly limited  Moisture: 4-->rarely moist  Activity: 3-->walks occasionally  Mobility: 2-->very limited  Nutrition: 3-->adequate  Friction and Shear: 2-->potential " problem  Ole Score: 17                          Labs:   Recent Labs   Lab 10/08/19  0541 10/07/19  0716   ALBUMIN  --  2.8*   HGB  --  9.7*   INR 2.50* 2.22*   WBC  --  5.6       Physical Exam  Skin inspection: focused Pannus    Wound Location:  Pannus                  9/25                                                              10/8          Right Pannus    Date of last photo 10/8/19  Wound History: Pt obese with excessive moisture/sweat in deep folds.   Measurements (length x width x depth, in cm)  Right pannus 0.1 cm x 2.0 cm  x  0.1 cm   Wound Base: 100 % pink moist tissue  Palpation of the wound bed: normal   Periwound skin: intact  Color: normal and consistent with surrounding tissue  Temperature: normal   Drainage:, scant  Description of drainage: serous  Odor: none  Pain: denies , none     2. Left groin    10/3                                                                              10/8        Date of last photo 10/8/19  Wound History: Previous ECMO site. Staples were removed on 10/1 and now noted dehiscence   Measurements (length x width x depth, in cm) 7.0 cm x 1.8 cm  x  1.0 cm   Wound Base: 30 % moist tan/yellow nonviable slough tissue and 70% pink moist tissue  Tunneling :  3mm  hole seen at  base( not probed)  Palpation of the wound bed: normal   Periwound skin: intact but moist  Color: normal and consistent with surrounding tissue  Temperature: normal   Drainage:, small  Description of drainage: serosanguinous  Odor:mild musty odor noted on 10/8  Pain:minimal and tender with cleansing    Interventions  Current support surface:Baritric  Current off-loading measures: Pillows under calves  Visual inspection of wound(s) completed  Wound Care: done per plan of care  Supplies: Polymem/xeoform  Education provided: wound progress  Discussed plan of care with Patient and RN    JOSE L VYAS RN  [facility]

## 2019-10-08 NOTE — PLAN OF CARE
Patient is A&Ox4 able to make needs known. Using call light appropriately. SBA with walker and gait belt. Bariatric recliner ordered. Continent voids in toilet. PVR 12. No BM, per report LBM was 10/4. PRN Tylenol given for aching sternal pain, pain resolved with medication. Has Bilateral groin wounds from Ecm[facility]. Patient stated they were changed at [facility] this AM, declined to let nursing change to night. Dressing where CDI. Interdry applied to pannus. Continue with POC.

## 2019-10-08 NOTE — PLAN OF CARE
PT eval completed today. Pt is below baseline for functional mobility - today presenting with deconditioning and weakness in BLE. Significant SOB with standing and walking, at this time is unable to access her home with her current endurance deficit - but is safe to be MOD I in her room.    Working toward safety and durability with ambulation to be able to return to home with intermittent A from family. Recommending OP PT as pt will benefit from ongoing OP Cardiac or Pulmonary rehab after d/c. DME needs: [facility]. Hopeful for d/c to home on Monday 10/14/19

## 2019-10-08 NOTE — PLAN OF CARE
OT Evaluation  Patient plan for discharge: Discharge date of 10/14 to home of apartment with mom living with her as long as assistance is needed for transition home.  Current status: Pt currently is Modified Independent in room - Mod I with bed mobility, bed, chair and toilet transfer, toileting, grooming while standing at sink and full dressing routine with use of FWW and AE as needed. Shower assessment to be performed tomorrow. Pt B UE AROM WNL and gross strength measured 4+/5 MMT. Pt with gross and fine motor skills WNL. Pt's cognition is baseline/clear and is oriented x 6 and follows 3 step Vcs.   Barriers to return to prior living situation: Pt presents with weakness/deconditioning and endurance deficits impairing ADL performance compared to baseline.   Recommendations for discharge: Pt will benefit from OP PT cardiac therapy services to continue to improve physical endurance and performance. No further OT services is expected at date of discharge

## 2019-10-08 NOTE — PROGRESS NOTES
"   10/08/19 0700   Quick Adds   Type of Visit Initial PT Evaluation   Living Environment   Lives With alone   Living Arrangements apartment   Home Accessibility no concerns   Living Environment Comment lives alone in an apartment in [place] with elevator access. Sister is POA and lives close to pt, mother lives with sister. Both able to help as needed.    Self-Care   Usual Activity Tolerance moderate   Current Activity Tolerance fair   Regular Exercise No   Activity/Exercise/Self-Care Comment Previously participating in pool exercise in her apartment building. States she has recently quit her job to work on improving her health - which has been steadily declining in the last few months.    Functional Level Prior   Ambulation 1-->assistive equipment   Transferring 0-->independent   Toileting 0-->independent   Bathing 1-->assistive equipment   Communication 0-->understands/communicates without difficulty   Swallowing 0-->swallows foods/liquids without difficulty   Cognition 0 - no cognition issues reported   Fall history within last six months no   Which of the above functional risks had a recent onset or change? ambulation;transferring;toileting;bathing;dressing   Prior Functional Level Comment She started using a FWW during the summer to help with endurance and low back pain. Ordered most things online and had difficulty with her fatigue and endurance. Drives. Was managing all iADLs.   General Information   Onset of Illness/Injury or Date of Surgery - Date 09/09/19   Referring Physician Dr. Chayito Cardona   Patient/Family Goals Statement \"I want to go home - I want to be healthy\"   Pertinent History of Current Problem (include personal factors and/or comorbidities that impact the POC) Per chart: 59 year old woman who presented to [facility] 9/9 via EMS with acute respiratory failure was intubated developed PEA arrest, found to have massive pulmonary embolism, echo with RV failure. Ad[facility] for ECMO and thrombectomy, course complicated by hypoxic respiratory failure, pleural effusions, pneumonia, thrombocytopenia, hypervolemia, and deconditioning. Admitted to acute rehab 10/7 for ongoing rehabilitation and medical management.    Precautions/Limitations fall precautions   General Observations Pt is overwhlemed this AM - tearful and anxious about difficulty night, room change, and lack of appropriate equipment. Reassurance provided and collaboration with RN staff to acquire necessary DME.    General Info Comments Pt is close to her mother and sister.    Cognitive Status Examination   Orientation orientation to person, place and time   Level of Consciousness alert   Follows Commands and Answers Questions 100% of the time   Personal Safety and Judgment intact   Memory intact   Pain Assessment   Patient Currently in Pain Yes, see Vital Sign flowsheet  (exertional rib pain with deep preathing d/t compressions)   Integumentary/Edema   Integumentary/Edema Comments Ziopatch in place. Large body habitus with BMI 58.7. Whole body edema related to medical dx. Multiple wounds being managed by RN staff.    Posture    Posture Comments Appropriate for age and dx.    Range of Motion (ROM)   ROM Comment No focal restriction identified. Painful with trunk rotation and deep breathing d/t rib pain from chest compressions at time of incident.    Strength   Strength Comments Generally strong 4+/5, with significant endurance deficits and SOB with activity.    ARC Assessment Only   Acute Rehab FIM See FIM scores for Mobility/ADL Assessment   Balance   Balance Comments Able to stand and sit unsupported. Uses walker for energy conservation.    Sensory Examination   Sensory Perception Comments Denies sensation changes in BLE and BUE. No evidence of sensory compromist.    Coordination   Coordination no deficits were identified   Muscle Tone   Muscle Tone no deficits were identified   Modality Interventions   Planned Modality  Interventions Comments None planned.    General Therapy Interventions   Planned Therapy Interventions balance training;bed mobility training;ROM;strengthening;stretching;gait training;groups;progressive activity/exercise;home program guidelines;risk factor education;transfer training;neuromuscular re-education   Clinical Impression   Criteria for Skilled Therapeutic Intervention yes, treatment indicated   PT Diagnosis deconditioning   Influenced by the following impairments see clinical impression comments   Functional limitations due to impairments see clinical impression comments   Clinical Presentation Evolving/Changing   Clinical Presentation Rationale Moderately complex d/t multisystem and multifactorial deficits - but good support and home which is accessible.    Clinical Decision Making (Complexity) Moderate complexity   Therapy Frequency Daily   Predicted Duration of Therapy Intervention (days/wks) 7 days   Anticipated Equipment Needs at Discharge walker   Anticipated Discharge Disposition Home with Home Therapy;Home with Outpatient Therapy   Risk & Benefits of therapy have been explained Yes   Patient, Family & other staff in agreement with plan of care Yes   Clinical Impression Comments Pt is below baseline for functional mobility - today presenting with deconditioning and weakness in BLE. Significant SOB with standing and walking, at this time is unable to access her home with her endurance deficit. Working toward safety and durability with ambulation to be able to return to home with intermittent A from family. Recommending OP PT as pt will benefit from ongoing OP Cardiac or Pulmonary rehab after d/c. DME needs: WW. Hopeful for d/c to home on Monday 10/14/19   Total Evaluation Time   Total Evaluation Time (Minutes) 20

## 2019-10-08 NOTE — PROGRESS NOTES
10/08/19 0915   Quick Adds   Type of Visit Initial Occupational Therapy Evaluation   Living Environment   Lives With alone   Living Arrangements apartment   Home Accessibility no concerns   Living Environment Comment OT: Lives alone in an apartment in [place] with elevator access. Apt. has elevator - a walking distance from Apt. door. Sister is POA and lives close to pt, mother lives with sister. Both able to help as needed. Pt reports mother will live with Pt to assist for a period as needed.    Self-Care   Usual Activity Tolerance moderate   Current Activity Tolerance poor   Regular Exercise No   Functional Level   Ambulation 1-->assistive equipment  (4 WW for long distances)   Transferring 0-->independent   Toileting 0-->independent   Bathing 1-->assistive equipment  (Shower chair and hand held shower)   Dressing 0-->independent   Eating 0-->independent   Communication 0-->understands/communicates without difficulty   Swallowing 0-->swallows foods/liquids without difficulty   Cognition 0 - no cognition issues reported   Fall history within last six months no   Which of the above functional risks had a recent onset or change? ambulation;transferring;toileting;bathing;dressing   Prior Functional Level Comment OT: Pt reports she was independent with all self cares and mobility prior to this summer. Pt reported she began using 4WW for long distances and needed to adapt many self care routines - dressing, bathing etc. due to physical limitations.    General Information   Onset of Illness/Injury or Date of Surgery - Date 09/09/19   Referring Physician Chayito Cardona MD   Patient/Family Goals Statement To improve physically to a higher level of independence   Additional Occupational Profile Info/Pertinent History of Current Problem Shira Rodriguez is a 59 year old woman who presented to [facility] 9/9 via EMS with acute respiratory failure was intubated developed PEA arrest, found to have massive pulmonary  embolism, echo with RV failure. Admitted to [facility] for ECMO and thrombectomy, course complicated by hypoxic respiratory failure, pleural effusions, pneumonia, thrombocytopenia, hypervolemia, and deconditioning. Admitted to acute rehab 10/7 for ongoing rehabilitation and medical management.    Weight-Bearing Status - LUE full weight-bearing   Weight-Bearing Status - RUE full weight-bearing   Weight-Bearing Status - LLE full weight-bearing   Weight-Bearing Status - RLE full weight-bearing   Heart Disease Risk Factors High blood pressure;Overweight;Medical history   General Observations OT: Pt was sitting up in chair upon therapist's arrival. Pt reports motivation for improving physically and became emotional upon talking about it. Pt reports she is close to family and they have been very supportive during her hospital stay. Pt's older sister Kirsten and mom will assistance as needed during stay and post discharge.    General Info Comments OT: Pt is performaing well with self cares upon evaluation and will be assessed today for Mod I in room    Cognitive Status Examination   Orientation orientation to person, place and time   Level of Consciousness alert   Follows Commands (Cognition) WNL;follows three step commands   Memory intact   Attention No deficits were identified   Organization/Problem Solving No deficits were identified   Executive Function No deficits were identified   Visual Perception   Visual Perception Wears glasses   Visual Acuity No deficits were identified   Visual Field No deficits were identified   Visual Attention No deficits were identified   Occulomotor No deficits were identified   Sensory Examination   Sensory Quick Adds No deficits were identified   Pain Assessment   Patient Currently in Pain   (Pt reports inconsistent chest pain from recent CPR performed)   Range of Motion (ROM)   ROM Comment B UE AROM WFL   Strength   Strength Comments B UE strength noted 4+/5 MMT   Hand Strength    Left hand  (pounds)   (WNL)   Right hand  (pounds)   (WNL)   Coordination   Upper Extremity Coordination No deficits were identified   Gross Motor Coordination WNL   Fine Motor Coordination WNL though Pt reports inconsistently impaired due to rheumatoid arthritis flair ups   ARC Assessment Only   Acute Rehab FIM See FIM scores for Mobility/ADL Assessment   Instrumental Activities of Daily Living (IADL)   Previous Responsibilities meal prep;housekeeping;laundry;shopping;medication management;finances;driving   Activities of Daily Living Analysis   Impairments Contributing to Impaired Activities of Daily Living strength decreased  (Impaired endurance )   General Therapy Interventions   Planned Therapy Interventions ADL retraining;bed mobility training;strengthening;home program guidelines;progressive activity/exercise   Clinical Impression   Criteria for Skilled Therapeutic Interventions Met yes, treatment indicated   OT Diagnosis Impaired ADL/IADL performance   Influenced by the following impairments Impaired endurance and strength    Assessment of Occupational Performance 3-5 Performance Deficits   Identified Performance Deficits Impaired performance due to endurance deficits with full AM self care routine completion, functional mobility, bathing, home management and meal preperation   Clinical Decision Making (Complexity) Moderate complexity   Therapy Frequency Daily   Predicted Duration of Therapy Intervention (days/wks) 6 days   Anticipated Equipment Needs at Discharge bath sponge;long shoe horn;reacher;sock aide  (Assess for AE for improvement of current performance )   Anticipated Discharge Disposition Home with Outpatient Therapy   Risks and Benefits of Treatment have been explained. Yes   Patient, Family & other staff in agreement with plan of care Yes   Clinical Impression Comments OT: Pt will benefit from 1:1 skilled OT services to address identified physical/endurance deficits to pursue PLOF with  ADL perofrmance, safety and level of independence.    Total Evaluation Time   Total Evaluation Time (Minutes) 15

## 2019-10-08 NOTE — H&P
Post Admission Physician Evaluation:    I have compared Shira Rodriguez's condition on admission to acute rehabilitation to that outlined in the preadmission screen. History and physical exam performed by me. No significant differences are identified and the patient remains appropriate for an inpatient rehabilitation facility level of care to manage medical issues and address functional impairments due to (rehabilitation diagnosis) PEA arrest due to massive bilateral PE, complicated by respiratory failure.    Comorbid medical conditions being managed: new diagnosis of A fib/flutter, Heart failure, and bilateral PE. PMH is significant for HTN, HLD, CYRUS, RA and COPD/    Prior functional level: mod I with basic mobility and ADLs; endurance and excessive fatigue were limiting.     Present function: SBA to CGA for most ADLs; max A for toileting and hygiene. Needs screening for functional cognition by OT.     Anticipated rehabilitation course: improvement to mod I level with basic ADLs and mobility. Will need help due to poor endurance at home/     Will benefit from intensive rehabilitation includin minutes each of PT and OT  Rehabilitation nursing  Close management by physiatry    Prognosis: good medical and rehab prognosis     Estimated length of stay: one week    Chayito Cardona MD  Physical Medicine & Rehabilitation

## 2019-10-08 NOTE — PLAN OF CARE
FOCUS/GOAL  Bowel management, Bladder management, Pain management, and Medical management    ASSESSMENT, INTERVENTIONS AND CONTINUING PLAN FOR GOAL:  New admitted. SBA with walker (declined for belt) to transfer from bed to toilet.  Pt had tolerable sternal pain and slept well overnight with snoring. Intermitted dry cough. PVR 26. Interdry in pannus.

## 2019-10-08 NOTE — PROGRESS NOTES
FOCUS/GOAL  Bowel management, Bladder management, Pain management, Wound care management and Mobility    ASSESSMENT, INTERVENTIONS AND CONTINUING PLAN FOR GOAL:  Patient is alert and oriented x 4 and is able to make needs known by using call light and verbalizing needs.  Has complaints of chest pain, baseline since chest compressions.  Requested and was given PRN Tylenol which was helpful.  Continent of bladder using toilet in bathroom, patient states baseline of constipation, going 3-4 days between BM's.  LBM 10/4/19.  Advanced to Mod I in room with walker.  Lisinopril held this AM due to hypotension (PA on unit and hospitalist notified) and Lasix given late due to hypotension per hospitalist.  WO nurse saw patient and performed wound care to bilateral groin areas.

## 2019-10-08 NOTE — PROGRESS NOTES
"  [facility]   Acute Rehabilitation Unit  Daily progress note    interval history  Shira Rodriguez was seen and examined at bedside.  Sitting up in chair frustrated with alarms and getting reoriented to new place, overwhelmed with process, new place and prolonged hospital course.  No n/v/, no sob, headaches or fevers.  Having regular bowel movements, frequent urination on diuretics no dysuria. No other concerns, will talk to psychology.     Functionally, undergoing therapy evaluations.       medications    atorvastatin  40 mg Oral QPM     DULoxetine  60 mg Oral Daily     [START ON 10/9/2019] folic acid  4 mg Oral Daily     furosemide  80 mg Oral Daily     [START ON 10/9/2019] leucovorin  5 mg Oral Q7 Days     lisinopril  5 mg Oral Daily     [START ON 10/9/2019] methotrexate  25 mg Subcutaneous Once     multivitamin w/minerals  1 tablet Oral or Feeding Tube Daily     pantoprazole  40 mg Oral BID AC     vitamin B1  100 mg Oral or Feeding Tube Daily     warfarin ANTICOAGULANT  7.5 mg Oral ONCE at 18:00        acetaminophen, cyclobenzaprine, - MEDICATION INSTRUCTIONS -, Warfarin Therapy Reminder     physical exam  BP 97/54 (BP Location: Right arm)   Pulse 107   Temp 96.7  F (35.9  C) (Oral)   Resp 16   Ht 1.707 m (5' 7.2\")   Wt (!) 171 kg (377 lb)   SpO2 94%   BMI 58.70 kg/m    Gen: awake alert   HEENT: mmm  Cardio: reg  Pulm: non labored clear diminished  Abd: obese soft non distended  Ext: warm dry   Neuro/MSK: up ambulating with walker    labs  Lab Results   Component Value Date    WBC 5.6 10/07/2019     Lab Results   Component Value Date    RBC 3.32 10/07/2019     Lab Results   Component Value Date    HGB 9.7 10/07/2019     Lab Results   Component Value Date    HCT 31.7 10/07/2019     Lab Results   Component Value Date    MCV 96 10/07/2019     Lab Results   Component Value Date    MCH 29.2 10/07/2019     Lab Results   Component Value Date    MCHC 30.6 10/07/2019     Lab " Results   Component Value Date    RDW 17.0 10/07/2019     Lab Results   Component Value Date     10/07/2019     Last Comprehensive Metabolic Panel:  Sodium   Date Value Ref Range Status   10/07/2019 138 133 - 144 mmol/L Final     Potassium   Date Value Ref Range Status   10/07/2019 4.0 3.4 - 5.3 mmol/L Final     Chloride   Date Value Ref Range Status   10/07/2019 106 94 - 109 mmol/L Final     Carbon Dioxide   Date Value Ref Range Status   10/07/2019 26 20 - 32 mmol/L Final     Anion Gap   Date Value Ref Range Status   10/07/2019 6 3 - 14 mmol/L Final     Glucose   Date Value Ref Range Status   10/07/2019 92 70 - 99 mg/dL Final     Urea Nitrogen   Date Value Ref Range Status   10/07/2019 19 7 - 30 mg/dL Final     Creatinine   Date Value Ref Range Status   10/07/2019 0.71 0.52 - 1.04 mg/dL Final     GFR Estimate   Date Value Ref Range Status   10/07/2019 >90 >60 mL/min/[1.73_m2] Final     Comment:     Non  GFR Calc  Starting 12/18/2018, serum creatinine based estimated GFR (eGFR) will be   calculated using the Chronic Kidney Disease Epidemiology Collaboration   (CKD-EPI) equation.       Calcium   Date Value Ref Range Status   10/07/2019 9.3 8.5 - 10.1 mg/dL Final       Rehabilitation - continue comprehensive acute inpatient rehabilitation program with multidisciplinary approach including therapies, rehab nursing, and physiatry following. See interval history for updates.      assessment and plan    Shira Rodriguez is a 59 year old woman who presented to [facility] 9/9 via EMS with acute respiratory failure was intubated developed PEA arrest, found to have massive pulmonary embolism, echo with RV failure. Admitted to [facility] for ECMO and thrombectomy, course complicated by hypoxic respiratory failure, pleural effusions, pneumonia, thrombocytopenia, hypervolemia, and deconditioning. Admitted to acute rehab 10/7 for ongoing rehabilitation and medical management.      Past medical  history significant for HTN, HLD, sleep apnea, morbid obesity, Rheumatoid Arthritis, Depression, and chronic bronchitis.    # PEA Arrest 2/2 massive pulmonary embolism-  # Acute Hypoxemic Respiratory Failure, improved- s/p VA ECMO IR thrombectomy 9/9 decannulated 9/17 with placement of VV ECMO 9/17-9/19   - respiratory status further complicated by  hx Chronic Bronchitis, Bilateral pleural effusions, hypervolemia, PNA (completed treatment).  -taper oxygen goal >88%  -encourage IS        # Bilateral Plueral Effusions, small L>R  # Volume Overload, likely 2/2 acute RV failure (resolved) and ECMO resuscitation, hypoalbumineam  -daily weights  -continue lasix per hospitalist titration  -monitor edema/respiratory status  -lymphedema wraps as needed.     # Sinus Pause- 3.6 seconds on telemetry 9/29 while patient sleeping.  Discussed with EP  BB discontinued, felt may be related to apnea  -optimize CYRUS tx as outpatient.    - Ziopatch x14 days placed 10/7  -f/u Dr. Valadez- EP     # Afib/Aflutter- Noted to be in atrial fibrillation while in the ICU, felt possibly 2/2 to Dobutamine which was discontinued 9/23 and patient was given amiodarone bolus at that time. She converted to sinus. 9/25 she went into atrial flutter with variable block. Again was given an amio bolus, fluid bolus, and IV metop x1. Continues to intermittently go into a-fib - rates now controlled.   - holding BB due to sinus pause as above.  - warfarin as above  -event monitor and EP f/u as above.     # HTN   SBP 90s-120s this am.  - Lasix 80 mg daily as listed above  - hold lisinopril today- per hospitalist recs f/u bp     #Sleep Apnea- with inability to tolerate CPAP in the past.   -monitor oxygen saturations as above  -consider overnight oximetry study prior to discharge   -should f/u in sleep medicine clinic as outpatient       # COPD- chronic bronchitis with tobacco abuse ~ 1 ppd prior to admission     # History of depression- long standing; was followed  by psychology and psychiatry teams prior to admission.   - continue Cymbalta (dose increased during hospital stay)     # HLD  - continue Atorvastatin 40mg     # Rheumatoid Arthritis- followed by Dr. Austin; diagnosed about 1.5 years ago.   - Resume PTA methotrexate/Wellcovorin   - Continue flexeril and tylenol prn     # Pannus Wound-  wound care as ordered.  # Wound at the left ECMO site: WOCN to follow.         1. Adjustment to disability:  Frustrated- consult health psychology  2. FEN: 2 gm na diet  3. Bowel: continent  4. Bladder: continent  5. DVT Prophylaxis:  warfarin  6. GI Prophylaxis: ppi  7. Code: full  8. Disposition: goal for home   9. ELOS: undergoing therapy evaluations today-   10. Follow up Appointments on Discharge: pcp, cardiology, electrophysiology       discussed with Dr. Cardona  PM&R Staff Physician    Carli Salas PA-C  Physical Medicine & Rehabilitation   Pager: [phone]

## 2019-10-08 NOTE — PLAN OF CARE
Pt is okay for Mod I in the room with walker. Pt assessed by PT/OT services and deemed appropriate to be Modified Independent in the room

## 2019-10-08 NOTE — CONSULTS
"Social Work: Initial Assessment with Discharge Plan     10/08/19 1400   Living Arrangements   Lives With alone   Living Arrangements apartment   Able to Return to Prior Arrangements yes   Living Arrangement Comments   (Mother to stay with patient 24/7 upon discharge. Sister, Kirsten, lives 5 min away and available to assist/provide support).   Discharge Planning   Disposition Comments   (Family to provide transportation home. )     Patient Name: Shira Rodriguez  : 1960  Age: 59 year old  MRN: [id number]  Completed assessment with: Patient  Admitted to ARU: 10/07/2019    Presenting Information   Date of SW assessment: 2019  Health Care Directive: Patient considering completing  Primary Health Care Agent: Patient when able.  Secondary Health Care Agent: Defer to NOK policy.  Living Situation: Apartment in [place], [place]. Lives on 3rd floor, elevator, \"long walk\" from elevator to apartment unit. Mother, Shira, to stay with patient 24/7 upon discharge to assist with cooking, cleaning, laundry, etc. Sister, Kirsten, lives 5 minutes from patient and is involved and available to assist/provide support as well. Patient has shower chair, FWW and does online grocery shopping.   Previous Functional Status: Mostly tndependent. Reports over the summer she began using FWW to walk from elevator to end of hallway where her unit is due to weakness, but did not use FWW in apartment. Uses chair in kitchen to cook.   DME available: See therapy notes.  Patient and family understanding of hospitalization: \"To get stronger so I can go home.\"  Cultural/Language/Spiritual Considerations: English speaking.      Physical Health  Reason for admission: Pulmonary embolism with acute cor pulmonale, unspecified chronicity, unspecified pulmonary embolism type (H).    Provider Information   Primary Care Physician:Anjel Busby   : None    Mental Health/Chemical Dependency:   Diagnosis: " Depression  Alcohol/Tobacco/Narcotis: History of smoking. Has not smoked since being hospitalized and states she does not plan to smoke again.   Support/Services in Place: Medication management. Sees therapist, Una Fregoso, at [facility] on a weekly basis.   Services Needed/Recommended: Continue medication management and resume therapy upon discharge. Therapist aware patient in ARU. Patient understands health psychologist available during ARU stay. States having visits from family and friends keeps her mood up.       Support System  Marital Status: Single  Family support: Mother, Kirsten, to stay at patient's home to provide assistance with cooking, cleaning, laundry upon discharge. Sister, Kirsten, supportive and available - lives and works part-time 5 minutes away. Kirsten's , Ata, also available and supportive. Sister, Melissa, lives in [place], [place] - recently got DUI, but patient reports close relationship. Has 6 niece's and nephews whom patient is very close to.   Other support available: Friends, Srinath and Marietta, have visited patient during hospitalization and provide good emotional support.     Community Resources  Current in home services: None.   Previous services: [facility] Therapy through [facility]   and outpatient PT through [facility]    Financial/Employment/Education  Employment Status: Retired  Income Source: Money made from house recently sold (a year ago), some prison.   Education: Did not discuss.  Financial Concerns:  Provided patient with SSD information and Senior Linkage Line. Patient plans to apply online for SSD.  Insurance: [employer]      Discharge Plan   Patient and family discharge goal: To return home with mother, support from family and recommended services.   Provided Education on discharge plan: YES  Patient agreeable to discharge plan:  YES  Provided education and attained signature for Medicare IM and IRF Patient Rights and Privacy Information provided to  patient : NA  Provided patient with [place] [facility] [place] Resources: NA  Barriers to discharge: Medical clearance, therapy goals met, safe discharge plan.    Discharge Recommendations   Disposition: Home with mother  Transportation Needs: Family to provide  Name of Transportation Company and Phone: NA    Additional comments   SW introduced self and role of SW in ARU to patient. Overall, patient is coping well, has a good support system and is looking forward to returning home. Patient's mother will be staying with patient upon discharge to assist with tasks like cooking, cleaning, laundry, etc. Patient scored 15/15 on BIMS assessment. SW will continue to follow and assist as needed.    ANTONINA Zamora,NANCY  TCU    Phone: [phone]  Pager: [phone]

## 2019-10-09 LAB
BACTERIA SPEC CULT: NO GROWTH
INR PPP: 2.88 (ref 0.86–1.14)
SPECIMEN SOURCE: NORMAL

## 2019-10-09 PROCEDURE — 25000128 H RX IP 250 OP 636: Performed by: PHYSICIAN ASSISTANT

## 2019-10-09 PROCEDURE — 85610 PROTHROMBIN TIME: CPT | Performed by: PHYSICIAN ASSISTANT

## 2019-10-09 PROCEDURE — 25000132 ZZH RX MED GY IP 250 OP 250 PS 637: Performed by: PHYSICIAN ASSISTANT

## 2019-10-09 PROCEDURE — 97110 THERAPEUTIC EXERCISES: CPT | Mod: GP

## 2019-10-09 PROCEDURE — 97535 SELF CARE MNGMENT TRAINING: CPT | Mod: GO

## 2019-10-09 PROCEDURE — 97530 THERAPEUTIC ACTIVITIES: CPT | Mod: GP

## 2019-10-09 PROCEDURE — 25000132 ZZH RX MED GY IP 250 OP 250 PS 637

## 2019-10-09 PROCEDURE — 36415 COLL VENOUS BLD VENIPUNCTURE: CPT | Performed by: PHYSICIAN ASSISTANT

## 2019-10-09 PROCEDURE — 99232 SBSQ HOSP IP/OBS MODERATE 35: CPT | Performed by: PHYSICIAN ASSISTANT

## 2019-10-09 PROCEDURE — 12800006 ZZH R&B REHAB

## 2019-10-09 PROCEDURE — 97110 THERAPEUTIC EXERCISES: CPT | Mod: GO

## 2019-10-09 RX ORDER — DIPHENHYDRAMINE HYDROCHLORIDE, ZINC ACETATE 2; .1 G/100G; G/100G
CREAM TOPICAL 3 TIMES DAILY PRN
Status: DISCONTINUED | OUTPATIENT
Start: 2019-10-09 | End: 2019-10-14 | Stop reason: HOSPADM

## 2019-10-09 RX ORDER — WARFARIN SODIUM 3 MG/1
6 TABLET ORAL
Status: COMPLETED | OUTPATIENT
Start: 2019-10-09 | End: 2019-10-09

## 2019-10-09 RX ADMIN — MULTIPLE VITAMINS W/ MINERALS TAB 1 TABLET: TAB at 08:03

## 2019-10-09 RX ADMIN — DULOXETINE HYDROCHLORIDE 60 MG: 60 CAPSULE, DELAYED RELEASE ORAL at 08:03

## 2019-10-09 RX ADMIN — RANITIDINE 150 MG: 150 TABLET ORAL at 20:34

## 2019-10-09 RX ADMIN — FOLIC ACID 4 MG: 1 TABLET ORAL at 08:03

## 2019-10-09 RX ADMIN — METHOTREXATE 25 MG: 25 INJECTION, SOLUTION INTRA-ARTERIAL; INTRAMUSCULAR; INTRATHECAL; INTRAVENOUS at 10:26

## 2019-10-09 RX ADMIN — RANITIDINE 150 MG: 150 TABLET ORAL at 08:04

## 2019-10-09 RX ADMIN — ATORVASTATIN CALCIUM 40 MG: 40 TABLET, FILM COATED ORAL at 20:34

## 2019-10-09 RX ADMIN — FUROSEMIDE 80 MG: 40 TABLET ORAL at 08:04

## 2019-10-09 RX ADMIN — WARFARIN SODIUM 6 MG: 3 TABLET ORAL at 17:57

## 2019-10-09 RX ADMIN — THIAMINE HCL TAB 100 MG 100 MG: 100 TAB at 08:04

## 2019-10-09 RX ADMIN — LISINOPRIL 5 MG: 5 TABLET ORAL at 08:03

## 2019-10-09 ASSESSMENT — MIFFLIN-ST. JEOR: SCORE: 2312.25

## 2019-10-09 NOTE — PLAN OF CARE
Patient has been up in the room with walker, and tolerated that well.  She did have a shower with the assist of OT.  Following that, she rested in the recliner, where her groin dressing was reapplied. Interdry into skin folds of panus/groin.  Denies discomfort except when coughing, which irritates her chest/rib cage where CPR was done in the past.  Voices that her Mother is willing to learn to do the dressing changes in the groin region. She was not here following the shower, however.      1500:  Patient's Mother is here.  She will not be coming back until Friday.  She will need to be shown how to change the dressing then.   Patient has continued to be Mod I in the room, and tolerated well.

## 2019-10-09 NOTE — PLAN OF CARE
FOCUS/GOAL  Bladder management, Pain management, Mobility, Cognition/Memory/Judgment/Problem solving, and Safety management    ASSESSMENT, INTERVENTIONS AND CONTINUING PLAN FOR GOAL:  Pt is alert and oriented. Continent of bladder, voiding without difficulty using toilet independently. Pt up Mod I with walker to the bathroom. No complaints of pain or discomfort overnight. Pt appeared to be sleeping during rounds. Uses call light appropriately, able to make needs known. Will continue with POC.

## 2019-10-09 NOTE — PROGRESS NOTES
[facility] Progress Note - Hospitalist Service       Date of Admission:  10/7/2019  Assessment & Plan    Shira Rodriguez is a 59 year old female with a hx of CYRUS, tobacco abuse, COPD, rheumatoid arthritis, and morbid obesity who was presented to [facility] with massive PE c/b PEA arrest 9/9 and ROSC after intubation/resuscitation. Transferred to [facility] for VA ECMO, IR thrombectomy and catheter directed lytics 9/9, decannulated on 9/17 c/b hypercarbic resp failure requiring placement of VV ECMO thru RIJ-FV bypass. S/p VV ECMO decannulation 9/19. Off sedation and extubated 9/22, now on RA. Course c/b afib/aflutter, ECMO cannula wound dehiscence, BL pleural effusions and volume overload 2/2 acute RV failure. Transferred to ARU 10/7 for therapies. Medicine consulted given patients medical complexity.     Plan for today  - Continue lasix at current dosing, will likely need to decrease in coming days pending Cr/lytes/weight as pt is starting to appear euvolemic  - Start prn benadryl cream to use around ziopatch d/t pruritis, if ineffective, can trial hydrocortisone 1% ointment  - Discussed above with primary team, medicine will sign off. If any ?s or concerns arise, would be happy to revisit with patient     PEA arrest 2/2 massive PE  Acute hypoxic resp failure - resolved  Small BL pleural effusions, L>R  Necrotic LLL on imaging, atelectasis vs infection  Volume overload 2/2 acute RV failure (now resolved) and ECMO resuscitation  Massive PE c/b PEA arrest 9/9 and ROSC after intubation/resuscitation. Transferred to [facility] for VA ECMO, IR thrombectomy and catheter directed lytics 9/9, decannulated on 9/17 c/b hypercarbic resp failure requiring placement of VV ECMO thru RIJ-FV bypass. S/p VV ECMO decannulation 9/19. Off sedation and extubated 9/22, now on RA at rest, 2-4L with activity and while sleeping.  CT 9/21 showed necrotic LLL, infection vs atelectasis - inpatient team  felt infection less likely as clinically stable. Dx thoracentesis 10/4 c/w transudative effusions c/w massive PE and acute heart failure. Has been on diuresis with improvement in wt and breathing. Wt 374lbs today, pt believes wt was ~400lbs prior to admission. On warfarin with therapeutic INR. Afebrile. O2 sats stable.   - Cont warfarin per pharmacy, goal 2-3. Plan for lifelong tx.   - Cont lasix 80mg po daily - will likely need to decrease in coming days pending Cr/lytes/weight  - Continue prn duonebs  - Cont IS and acapella  - Cont supplemental oxygen with activity to keep sats >90%, 2L while sleeping d/t CYRUS  - Monitor closely for s/sx of infection, low threshold to re-image if concerned about necrotic lung infection  - Repeat CT Chest in 4-6 weeks     Sinus pause. Noted overnight 9/29 while sleeping, 3.6 seconds long. Case was d/w EP, BB stopped, no other intervention needed. She does have CYRUS as below. Ziopatch placed 10/7. Pt reports some pruritis around ziopatch.  - Cont ziopatch x 14d from placement  - Start prn benadryl cream around ziopatch to help with pruritis  - Follow up with sleep specialist on discharge     Paroxysmal atrial fibrillation/a flutter. Initially noted while critically ill in the ICU and on dobutamine. Dobutamine stopped and pt received amiodarone bolus with successful conversion to NSR. Recurrent aflutter 9/25 requiring amio bolus, IVF, and IV metoprolol x 1 with conversion to NSR. Has been in and out of NSR and Afib since. EP evaluated 9/30, recommend ziopatch at discharge, unable to tolerate BB d/t sinus pause (as above). On warfarin as above. Monitor.      L groin ECMO cannula site wound dehiscence. Staples removed 10/1 with dehiscence shortly after. WOCN following, saw 10/8. Examined lesion today, no s/sx of infection - good granulation tissue with scant bleeding at edges of wound.      HTN. BPs soft to normotensive today. Cont lisinopril 5mg daily with hold parameters, uptitrate as  needed.      COPD  Hx of tobacco abuse  Hx of COPD not maintained on any inhalers or nebs prior to admission. Course c/b resp failure 2/2 PE, but there was also concern for possible COPD exacerbation, thus she was treated with a prednisone burst. Hypoxia improving as above, no wheezing on exam. Cont to monitor, will need to follow up with PCP and consider formal PFTs on discharge.      HLD. Cont lipitor.      CYRUS. Dx with CYRUS 4/2019, has not tolerated CPAP at home d/t mask discomfort. Agreeable to using oxygen while sleeping (requiring 2-4L) to keep sats >88%.      Rheumatoid arthritis. PTA on methotrexate + wellcovorin, held while inpatient and resumed at ARU. No acute RA symptoms currently.      Depression. Hx of depression maintained on cymbalta. Initially held while critically ill, now resumed and tolerating without issue. Mood stable. Cont cymbalta 60mg daily.      Pannus wound d/t intertrigo. Improving. WOCN following, last saw 10/8.      Deconditioning. Defer to PMR, primary team.     Resolved hospital issues  Ventilator associated pneumonia. Sputum cx 9/11 + MSSA s/p course of vanc + zosyn, then cefazolin thru 9/22. Sputum cx 9/20 + serratia, s/p ceftriaxone 9/23-9/28.   Acute thrombocytopenia. Felt to be 2/2 consumption. HIT ab was not checked. Plt >100. CBC qM/Th.     Diet: Combination Diet 2 gm NA Diet  Room Service  Snacks/Supplements Adult: Other; If pt asks for a supplement, please send. Preference: Boost Breeze; With Meals    DVT Prophylaxis: warfarin  [place] Catheter: not present  Code Status: Full Code         Nancy Morley PA-C  Hospitalist Service  [facility]    ______________________________________________________________________    Interval History   Shira is doing okay today. Slept well last night. Breathing is stable. No worsening pain noted to L groin wound. She reports voiding well. Denies any chest pain or shortness of breath. Feels edema in  legs steadily improving. No fevers or chills. No other acute concerns.     Physical Exam   Vital Signs: Temp: 96.9  F (36.1  C) Temp src: Oral BP: 136/70 Pulse: 82   Resp: 16 SpO2: 93 % O2 Device: None (Room air)    Weight: 374 lbs 0 oz  GENERAL: Alert and oriented x 3. NAD. Resting in chair. Pleasant and cooperative.   HEENT: MMM  CV: RRR.   RESPIRATORY: Effort normal on RA. Lungs CTAB, distant sounds in bases.   GI: Abdomen obese, soft, nt/nd.   NEUROLOGICAL: No focal deficits. Moves all extremities.    EXTREMITIES: trace peripheral edema on exam today. Intact bilateral pedal pulses.   SKIN: L groin wound with good granulation tissue and scant bleeding around lateral and inferior edges - no fluctuance, ttp, surrounding erythema or drainage. Chronic venous stasis changes to BLE.

## 2019-10-09 NOTE — CONSULTS
Health Psychology                  Clinic    Department of Medicine  Mary Rosa, Ph.D., L.P. [phone]                          Clinics and Surgery Center  [facility] [place] [place], Ph.D.,  L.P. [phone]                 3rd Floor  [place] [address]   Diana Wenier, Ph.D., L.P. [phone] [address] Mindi Lopez, Ph.D., L.P. [phone]            [place] [address]          Tad Abdi, Ph.D., A.B.GEORGE.P., L.P. [phone]      Anjali Rios, Ph.D., L.P. [phone]      Inpatient Health Psychology Consultation*      DOS:  10/09/2019      REFERRAL SOURCE:  Carli Salas PA-C, Acute Rehabilitation Center, [facility].      REASON FOR REFERRAL:  Shira Rodriguez is a 59-year-old woman currently on the Acute [facility] Center.  Ms. Rodriguez was admitted to [facility] from  through 10/07/2019 with a massive pulmonary embolism related to a PEA arrest.  She was on ECMO for 10 days.  She had multiple complications during her acute hospitalization.  Ms. Rodriguez has additional extensive medical issues, as well as a longstanding history of significant depression.  She reported a considerable level of anxiety on admission to the [facility] on 10/07.  This evaluation was requested to assess her current emotional status and to make treatment recommendations.      HISTORY OF PRESENT ILLNESS:  Ms. Rodriguez reports that she has dealt with a significant level of depression for many years.  She reports that her father, who was an active alcoholic,  when she was 12, and that this was part of difficulties during childhood.  Ms. Rodriguez has found benefit in both medication and psychotherapy in the past.  She has been receiving her mental health care through [facility] and plans to return to those providers following discharge.  Ms. Rodriguez reports that her depression has been severe over the years, but has never included suicidal ideation, even passive SI.  She does state that she can become quite hopeless, but that it has never resulted in SI.  She notes that prior to hospital admission, she had been on a second antidepressant in addition to her 60 mg of Cymbalta and that this has not been continued.  I note that in her discharge summary, it suggests that she had been on Wellbutrin and that this was discontinued because of unwanted side effects of increased crying.  Today, Ms. Rodriguez tells me that she had, indeed, been on a trial of Wellbutrin, and had discontinued that long before this hospitalization.  She believes that she was most recently on a low dose of Abilify in addition to the Cymbalta 60 mg/day which was very helpful in maintaining a more positive and balanced mood.  She plans to call her pharmacy today to verify what that prescription was.     Currently, Ms. Rodriguez reports that the anxiety she experienced on transfer to the [facility] on 10/07 has dissipated.  She feels much more confident and less anxious following a very helpful conversation with her physical therapist, Vicky Pratt.  Her anxiety had been focused on her fears about being pushed to discharge too quickly and concern that she would not have regained enough strength to be safe and independent at home.  She now feels more confident that with another close to a week of intensive rehabilitation work here on the [facility], that she will be at a place that she feels comfortable to discharge.       Ms. Rodriguez struggled during this conversation to acknowledge that she is making progress in her rehabilitation work and function, even though she is aware that she is doing so much better than she had been.  After some conversation, she acknowledged that she is not using proximal function as a basis for comparison for her current function, and that she is focused on her beliefs  "about how she \"should\" be able to function.      Ms. Rodriguez has been diagnosed with obstructive sleep apnea, but has not been using her CPAP because she has found that she pulls it off in her sleep after a couple of hours.  She is not interested in having respiratory therapy come in to look at her home machine, which could be brought in by a family member, stating that she simply does not have the energy to deal with that currently.  She notes that she believes her appetite is at its baseline and that she is finding the hospital food very acceptable.      Ms. Rodriguez was appreciative of this contact with Health Psychology.  At her request, her mother remained in the room for much of this conversation, but left toward the end, when Ms. Rodriguez's nephew arrived to visit.  Ms. Rodriguez indicated that she would be open to additional contact with Health Psychology during this [facility] admission.      SOCIAL HISTORY:  Ms. Rodriguez was born and grew up in [place].  As noted above, her father  when she was 12 years old.  She is the eldest of 3 daughters and tells me that her sister, Karo, is her closest friend and has been her whole life.  Karo and her family, as well as Ms. Rodriguez's youngest sister, Melissa, and her family have all moved to [place], as has their mother, who is also named Shira.  Ms. Rodriguez's sister, Karo, is a  in the [employer] and has a small Buddhist that has provided Ms. Rodriguez with great support and some friendships.  Ms. Rodriguez attended [employer] and received a BS in nursing.  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  She left under unfortunate circumstances due to conflict with a new supervisor, and there was some legal action that was " resolved out of court.  She states that this was a great stressor and trigger for increased depression.  She notes that because she had not grown up in [place], her work colleagues were her closest friends and social [place], and she has lost most of those connections since leaving the University several years ago.  She has remained very close with at least 1 former colleague.  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  Ms. Rodriguez is close with her family and gets great support from them.  Her other primary source of pleasure is watching movies.  She notes that she has never had a significant romantic relationship and that this used to feel like a great missing piece in her life, as she had hoped to have children, but that she has come to peace with it and feels that it is no longer a stressor.      MEDICAL HISTORY:  Ms. Rodriguez's medical record indicates a history of hypertension, sleep apnea, rheumatoid arthritis for which she has undergone some joint replacements, chronic bronchitis and morbid obesity.  Please see her [facility] electronic medical record for more complete information on her medical history, current admission and status, and all medications.      PSYCHIATRIC HISTORY:  As above in HPI.  Ms. Rodriguez receives her psychiatric and psychological care through [facility].  She has never experienced suicidal ideation nor has she ever been hospitalized psychiatrically.  No other significant psychiatric history was available at the time of this evaluation.      BEHAVIORAL IMPRESSIONS:  Ms. Rodriguez presented for this evaluation sitting up in her room on the [facility] between scheduled rehabilitation therapies, wearing street clothes.  She presents as a morbidly obese woman who looks younger than her stated age.  She reported her mood as less anxious than it had been on  admission to [facility], but still depressed at a higher level than her baseline prior to this hospitalization.  She exhibited a completely euthymic to bright affect.  Speech was clear, coherent and goal oriented.  She was an excellent  of her own personal history.  Insight and judgment appear to be good.  She exhibited no evidence of distortions of thought or perception.      IMPRESSIONS AND PLAN:  Shira Rodriguez is a 59-year-old woman currently on the [facility] Center following an extended hospitalization resulting from bilateral PEs that led to a PEA arrest and need for 10 days of ECMO.  Her depression is under fairly good control, but she notes that she felt that it was in better control and more balanced prior to this hospitalization when she was using a second medication, which she believes was Abilify, that assisted to maintain a more stable and upbeat mood.  Ms. Rodriguez will call her pharmacy this afternoon to verify what that medication and dosage was.  She is quite open to the need for a psychiatric consultation while here on the [facility], if that is deemed potentially beneficial.  She was appreciative of this contact with Health Psychology and would be open to additional contact throughout this [facility] admission.  I will plan to follow her.      DIAGNOSES:  Major depressive disorder, recurrent, moderate to severe (F33.1).         CJ ORLANDO, PHD, LP           *In accordance with the Rules of the [place] Board of Psychology, it is noted that psychological descriptions and scientific procedures underlying psychological evaluations have limitations.  Absolute predictions cannot be made based on information in this report.          D: 10/09/2019   T: 10/09/2019   MT: MARIOLA      Name:     SHIRA RODRIGUEZ   MRN:      [id number]        Account:       [id number]   :      1960           Consult Date:  10/09/2019      Document: [id number]

## 2019-10-09 NOTE — PLAN OF CARE
"Acute Rehab Care Conference/Team Rounds      Type: Team Rounds    Present: Dr Chayito Cardona, Carli Salas PA, Yesica Quiros RN, Vicky Pratt PT, Nida Garcia OT, Marcy Leon SW, Sari Warren SLP, Jess Bonds , Smita Pulliam Dietician      Discharge Barriers/Treatment/Education    Rehab Diagnosis: PEA arrest due to massive bilateral PE, complicated by respiratory failure    Active Medical Co-morbidities/Prognosis: new diagnosis of A fib/flutter, Heart failure, and bilateral PE. PMH is significant for HTN, HLD, CYRUS, RA and COPD    Safety: Pt is alert and oriented. Uses call light appropriately, able to make needs known. No alarms in use.    Pain: Sternal pain managed withTylenol.     Medications, Skin, Tubes/Lines: Medications taken whole with water. Skin with scab on right neck from ECMO, right groin skin tear and left groin ECMO site. No tubes or lines.     Swallowing/Nutrition: on regular diet     Bowel/Bladder: Continent of bladder, voiding without difficulty using toilet. Pt on Lasix.  Continent of bowel, last BM 10/8. No bowel meds ordered.     Psychosocial: Apartment in [place], [place]. Lives on 3rd floor, elevator, \"long walk\" from elevator to apartment unit. Mother, Shira, to stay with patient 24/7 upon discharge to assist with cooking, cleaning, laundry, etc. Sister, Kirsten, lives 5 minutes from patient and is involved and available to assist/provide support as well. Patient has shower chair, FWW and does online grocery shopping. Mostly tndependent. Reports over the summer she began using FWW to walk from elevator to end of hallway where her unit is due to weakness, but did not use FWW in apartment. Uses chair in kitchen to cook. Mother, Kirsten, to stay at patient's home to provide assistance with cooking, cleaning, laundry upon discharge. Sister, Kirsten, supportive and available - lives and works part-time 5 minutes away. Kirsten's , Ata, also available and supportive. Sister, Melissa, " Referral generated and pending approval for Dr. Alonzo Barrientos. Notes faxed to Regina Ferguson for review.    lives in [place], [place] - recently got DUI, but patient reports close relationship. Has 6 niece's and nephews whom patient is very close to.      ADLs/IADLs: Pt is mod I for basic ads and short distance mobility in the room. Pt demonstrates decreased endurnace and overall strength and continued skilled OT needed to improve for functional independence and return home. No additional OT needed at this time. AE/DME: pending    Mobility: Eval completed yesterday. Pt is MOD I in room, but needs work to improve endurance in order to return to home with OP PT vs HC. Pt will be walker based at time of discharging. Family will be available to support.    Cognition/Language: at baseline     Community Re-Entry: sister and mother are very supportive     Transportation: Car transfer not a barrier.    Decision maker: self    Plan of Care and goals reviewed and updated.    Discharge Plan/Recommendations    Fall Precautions: modify    Overall plan for the patient: Shira is mod I in her room with FWW. Her endurance remains very poor due to cardiac issues and severe debility. Anticipate a few more days at ARU before discharge to home, possibly 10/15. Will have outpatient therapies / cardiac rehab after discharge.       Utilization Review and Continued Stay Justification    Medical Necessity Criteria:    For any criteria that is not met, please document reason and plan for discharge, transfer, or modification of plan of care to address.    Requires intensive rehabilitation program to treat functional deficits?: Yes    Requires 3x per week or greater involvement of rehabilitation physician to oversee rehabilitation program?: Yes    Requires rehabilitation nursing interventions?: Yes    Patient is making functional progress?: Yes    There is a potential for additional functional progress? Yes    Patient is participating in therapy 3 hours per day a minimum of 5 days per week or 15 hours per week in 7 day period?:Yes    Has discharge needs  that require coordinated discharge planning approach?:Yes        Final Physician Sign off    Statement of Approval: I agree with all the recommendations detailed in this document.      Patient Goals  Social Work Goals: Confirm discharge recommendations with therapy, coordinate safe discharge plan and remain available to support and assist as needed.         OT Frequency: OT: Pt to be seen  minutes daily  OT goal: upper body bathing: Modified independent  OT goal: lower body bathing: Modified independent  OT goal: meal preparation: Modified independent  OT goal: home management: Modified independent  OT goal: perform aerobic activity with stable cardiovascular response: continuous activity, 10 minutes     OT goal 1: Pt will demonstrate stable cardiovascular response following completion of full self care routine  OT goal 2: Pt will demonstrate understanding for completion of established HEP to carry over to community        PT Frequency: 90 minutes daily  PT goal: bed mobility: Independent, Supine to/from sit, Rolling  PT goal: transfers: Modified independent, Supervision/stand-by assist, Sit to/from stand, Bed to/from chair, Assistive device  PT goal: gait: Modified independent, Greater than 200 feet, Rolling walker(300 ft to access apartment from elevator)  PT goal: stairs: 1 stair, Modified independent(Platform stair to get in/out of bed)        PT goal 1: Car transfer into family vehicle - SBA        Nursing goals   Patient Goal:  Pain Management: Patient will advocate for own pain by alert staff when in pain, and utilizing pharmacological and nonpharamcological interventions to provide relief.    Goal: Wound Management: Patient and/or family member will demonstrate how to properly dress wound in bilateral groin by 10/21/19    Goal: Skin Integrity: patient will verbalize/ or demonstrate 3 methods to promote skin integrity, and remain free from skinbreakdown by during stay at [facility]

## 2019-10-09 NOTE — PLAN OF CARE
VSS. Alert and orientedx4. Pleasant and cooperative. Able ot make needs known and used call light appropriately. Denies any pain, SOB, N/V, headache, dizziness. BM+ today. Continent of bowel and bladder. Mod I in room. Dressings to bilat groin CDI, interdry to abdominal folds.   Will continue plan of care.  Vital signs:  Temp: 97.1  F (36.2  C) Temp src: Oral BP: 128/51 Pulse: 77   Resp: 16 SpO2: 92 % O2 Device: None (Room air)

## 2019-10-10 LAB
ALBUMIN SERPL-MCNC: 2.9 G/DL (ref 3.4–5)
ALP SERPL-CCNC: 73 U/L (ref 40–150)
ALT SERPL W P-5'-P-CCNC: 21 U/L (ref 0–50)
ANION GAP SERPL CALCULATED.3IONS-SCNC: 9 MMOL/L (ref 3–14)
AST SERPL W P-5'-P-CCNC: 16 U/L (ref 0–45)
BASOPHILS # BLD AUTO: 0 10E9/L (ref 0–0.2)
BASOPHILS NFR BLD AUTO: 0.3 %
BILIRUB DIRECT SERPL-MCNC: 0.1 MG/DL (ref 0–0.2)
BILIRUB SERPL-MCNC: 0.4 MG/DL (ref 0.2–1.3)
BUN SERPL-MCNC: 20 MG/DL (ref 7–30)
CALCIUM SERPL-MCNC: 9.2 MG/DL (ref 8.5–10.1)
CHLORIDE SERPL-SCNC: 108 MMOL/L (ref 94–109)
CO2 SERPL-SCNC: 24 MMOL/L (ref 20–32)
CREAT SERPL-MCNC: 0.77 MG/DL (ref 0.52–1.04)
DIFFERENTIAL METHOD BLD: ABNORMAL
EOSINOPHIL # BLD AUTO: 0.2 10E9/L (ref 0–0.7)
EOSINOPHIL NFR BLD AUTO: 3.3 %
ERYTHROCYTE [DISTWIDTH] IN BLOOD BY AUTOMATED COUNT: 16.8 % (ref 10–15)
GFR SERPL CREATININE-BSD FRML MDRD: 85 ML/MIN/{1.73_M2}
GLUCOSE SERPL-MCNC: 108 MG/DL (ref 70–99)
HCT VFR BLD AUTO: 34.1 % (ref 35–47)
HGB BLD-MCNC: 10.5 G/DL (ref 11.7–15.7)
IMM GRANULOCYTES # BLD: 0 10E9/L (ref 0–0.4)
IMM GRANULOCYTES NFR BLD: 0.2 %
INR PPP: 2.96 (ref 0.86–1.14)
LYMPHOCYTES # BLD AUTO: 1.1 10E9/L (ref 0.8–5.3)
LYMPHOCYTES NFR BLD AUTO: 18.4 %
MAGNESIUM SERPL-MCNC: 1.9 MG/DL (ref 1.6–2.3)
MCH RBC QN AUTO: 28.6 PG (ref 26.5–33)
MCHC RBC AUTO-ENTMCNC: 30.8 G/DL (ref 31.5–36.5)
MCV RBC AUTO: 93 FL (ref 78–100)
MONOCYTES # BLD AUTO: 0.4 10E9/L (ref 0–1.3)
MONOCYTES NFR BLD AUTO: 7 %
NEUTROPHILS # BLD AUTO: 4.1 10E9/L (ref 1.6–8.3)
NEUTROPHILS NFR BLD AUTO: 70.8 %
NRBC # BLD AUTO: 0 10*3/UL
NRBC BLD AUTO-RTO: 0 /100
PLATELET # BLD AUTO: 209 10E9/L (ref 150–450)
POTASSIUM SERPL-SCNC: 3.8 MMOL/L (ref 3.4–5.3)
PROT SERPL-MCNC: 7.3 G/DL (ref 6.8–8.8)
RBC # BLD AUTO: 3.67 10E12/L (ref 3.8–5.2)
SODIUM SERPL-SCNC: 141 MMOL/L (ref 133–144)
WBC # BLD AUTO: 5.8 10E9/L (ref 4–11)

## 2019-10-10 PROCEDURE — 97110 THERAPEUTIC EXERCISES: CPT | Mod: GO

## 2019-10-10 PROCEDURE — 97530 THERAPEUTIC ACTIVITIES: CPT | Mod: GO

## 2019-10-10 PROCEDURE — 25000132 ZZH RX MED GY IP 250 OP 250 PS 637: Performed by: PHYSICIAN ASSISTANT

## 2019-10-10 PROCEDURE — 25000132 ZZH RX MED GY IP 250 OP 250 PS 637: Performed by: PHYSICAL MEDICINE & REHABILITATION

## 2019-10-10 PROCEDURE — 80048 BASIC METABOLIC PNL TOTAL CA: CPT | Performed by: PHYSICIAN ASSISTANT

## 2019-10-10 PROCEDURE — 36415 COLL VENOUS BLD VENIPUNCTURE: CPT | Performed by: PHYSICIAN ASSISTANT

## 2019-10-10 PROCEDURE — 85610 PROTHROMBIN TIME: CPT | Performed by: PHYSICIAN ASSISTANT

## 2019-10-10 PROCEDURE — 25000128 H RX IP 250 OP 636: Performed by: PHYSICIAN ASSISTANT

## 2019-10-10 PROCEDURE — 83735 ASSAY OF MAGNESIUM: CPT | Performed by: PHYSICIAN ASSISTANT

## 2019-10-10 PROCEDURE — 97535 SELF CARE MNGMENT TRAINING: CPT | Mod: GO

## 2019-10-10 PROCEDURE — 80076 HEPATIC FUNCTION PANEL: CPT | Performed by: PHYSICIAN ASSISTANT

## 2019-10-10 PROCEDURE — 97110 THERAPEUTIC EXERCISES: CPT | Mod: GP | Performed by: PHYSICAL THERAPIST

## 2019-10-10 PROCEDURE — 85025 COMPLETE CBC W/AUTO DIFF WBC: CPT | Performed by: PHYSICIAN ASSISTANT

## 2019-10-10 PROCEDURE — 12800006 ZZH R&B REHAB

## 2019-10-10 RX ORDER — ARIPIPRAZOLE 5 MG/1
5 TABLET ORAL DAILY
Status: DISCONTINUED | OUTPATIENT
Start: 2019-10-10 | End: 2019-10-14 | Stop reason: HOSPADM

## 2019-10-10 RX ADMIN — THIAMINE HCL TAB 100 MG 100 MG: 100 TAB at 08:08

## 2019-10-10 RX ADMIN — ARIPIPRAZOLE 5 MG: 5 TABLET ORAL at 11:29

## 2019-10-10 RX ADMIN — DULOXETINE HYDROCHLORIDE 60 MG: 60 CAPSULE, DELAYED RELEASE ORAL at 08:09

## 2019-10-10 RX ADMIN — LEUCOVORIN CALCIUM 5 MG: 5 TABLET ORAL at 08:09

## 2019-10-10 RX ADMIN — ACETAMINOPHEN 650 MG: 325 TABLET, FILM COATED ORAL at 09:58

## 2019-10-10 RX ADMIN — WARFARIN SODIUM 4 MG: 3 TABLET ORAL at 17:51

## 2019-10-10 RX ADMIN — FUROSEMIDE 80 MG: 40 TABLET ORAL at 08:08

## 2019-10-10 RX ADMIN — RANITIDINE 150 MG: 150 TABLET ORAL at 21:09

## 2019-10-10 RX ADMIN — ATORVASTATIN CALCIUM 40 MG: 40 TABLET, FILM COATED ORAL at 21:09

## 2019-10-10 RX ADMIN — FOLIC ACID 4 MG: 1 TABLET ORAL at 08:07

## 2019-10-10 RX ADMIN — RANITIDINE 150 MG: 150 TABLET ORAL at 08:08

## 2019-10-10 RX ADMIN — MULTIPLE VITAMINS W/ MINERALS TAB 1 TABLET: TAB at 08:08

## 2019-10-10 RX ADMIN — LISINOPRIL 5 MG: 5 TABLET ORAL at 08:08

## 2019-10-10 NOTE — PROGRESS NOTES
"  [facility]   Acute Rehabilitation Unit  Daily progress note    interval history  Shira Rodriguez was seen sitting up in chair, reports ongoing frequent urination overnight feels this is related to am lasix,  though feels breathing continues to improve will continue as ongoing bibaislar crackles, electrolytes, cr, stable.  No n/v/d, no headaches or fevers.  Tolerating therapy well though feeling emotional notes she was started on abilify by psychiatrist at [facility] and would like this to be resumed, reviewed patients [facility] list and dosing and will restart today.  Also discussed plan for discharge Tuesday, patient asked about fp apt scheduled for 10/14 will reschedule, patient asked that we update Dr. Busby of course prior to her apt, will plan to update via epic inbox vs phone call prior to patient's discharge.        medications    ARIPiprazole  5 mg Oral Daily     atorvastatin  40 mg Oral QPM     DULoxetine  60 mg Oral Daily     folic acid  4 mg Oral Daily     furosemide  80 mg Oral Daily     leucovorin  5 mg Oral Q7 Days     lisinopril  5 mg Oral Daily     methotrexate  25 mg Subcutaneous Q7 Days     multivitamin w/minerals  1 tablet Oral or Feeding Tube Daily     ranitidine  150 mg Oral BID     vitamin B1  100 mg Oral or Feeding Tube Daily        acetaminophen, cyclobenzaprine, diphenhydrAMINE-zinc acetate, - MEDICATION INSTRUCTIONS -, Warfarin Therapy Reminder     physical exam  /64 (BP Location: Right arm)   Pulse 111   Temp 96.2  F (35.7  C) (Oral)   Resp 16   Ht 1.707 m (5' 7.2\")   Wt (!) 169.6 kg (374 lb)   SpO2 98%   BMI 58.23 kg/m      Gen: NAD, sitting in chair   Cardio: RRR - Ziopatch in place  Pulm: bibaislar L>R crackles   Abd: obese soft non distended  Ext: trace edema in bilateral lower extremities, chronic skin discoloration at anterior leg areas   Neuro/MSK: bilateral upper extremities with 4+/5 strength. HF 4-/5 and limited by body " habitus. 4/5 distally in bilateral lower extremities. Intact sensation       Labs  Lab Results   Component Value Date    WBC 5.8 10/10/2019     Lab Results   Component Value Date    RBC 3.67 10/10/2019     Lab Results   Component Value Date    HGB 10.5 10/10/2019     Lab Results   Component Value Date    HCT 34.1 10/10/2019     Lab Results   Component Value Date    MCV 93 10/10/2019     Lab Results   Component Value Date    MCH 28.6 10/10/2019     Lab Results   Component Value Date    MCHC 30.8 10/10/2019     Lab Results   Component Value Date    RDW 16.8 10/10/2019     Lab Results   Component Value Date     10/10/2019     Last Comprehensive Metabolic Panel:  Sodium   Date Value Ref Range Status   10/10/2019 141 133 - 144 mmol/L Final     Potassium   Date Value Ref Range Status   10/10/2019 3.8 3.4 - 5.3 mmol/L Final     Chloride   Date Value Ref Range Status   10/10/2019 108 94 - 109 mmol/L Final     Carbon Dioxide   Date Value Ref Range Status   10/10/2019 24 20 - 32 mmol/L Final     Anion Gap   Date Value Ref Range Status   10/10/2019 9 3 - 14 mmol/L Final     Glucose   Date Value Ref Range Status   10/10/2019 108 (H) 70 - 99 mg/dL Final     Urea Nitrogen   Date Value Ref Range Status   10/10/2019 20 7 - 30 mg/dL Final     Creatinine   Date Value Ref Range Status   10/10/2019 0.77 0.52 - 1.04 mg/dL Final     GFR Estimate   Date Value Ref Range Status   10/10/2019 85 >60 mL/min/[1.73_m2] Final     Comment:     Non  GFR Calc  Starting 12/18/2018, serum creatinine based estimated GFR (eGFR) will be   calculated using the Chronic Kidney Disease Epidemiology Collaboration   (CKD-EPI) equation.       Calcium   Date Value Ref Range Status   10/10/2019 9.2 8.5 - 10.1 mg/dL Final           Rehabilitation - continue comprehensive acute inpatient rehabilitation program with multidisciplinary approach including therapies, rehab nursing, and physiatry following. See interval history for updates.       assessment and plan    Shira Rodriguez is a 59 year old woman who presented to [facility] 9/9 via EMS with acute respiratory failure was intubated developed PEA arrest, found to have massive pulmonary embolism, echo with RV failure. Admitted to [facility] for ECMO and thrombectomy, course complicated by hypoxic respiratory failure, pleural effusions, pneumonia, thrombocytopenia, hypervolemia, and deconditioning. Admitted to acute rehab 10/7 for ongoing rehabilitation and medical management.      Past medical history significant for HTN, HLD, sleep apnea, morbid obesity, Rheumatoid Arthritis, Depression, and chronic bronchitis.    # PEA Arrest 2/2 massive pulmonary embolism-  # Acute Hypoxemic Respiratory Failure, improved- s/p VA ECMO IR thrombectomy 9/9 decannulated 9/17 with placement of VV ECMO 9/17-9/19   - respiratory status further complicated by  hx Chronic Bronchitis, Bilateral pleural effusions, hypervolemia, PNA (completed treatment).  -taper oxygen goal >88%- stable on room air.  -encourage IS        # Bilateral Plueral Effusions, small L>R  # Volume Overload, likely 2/2 acute RV failure (resolved) and ECMO resuscitation, hypoalbuminemia  -daily weights  -continue lasix 80 mg daily- monitor electrolytes weight and respiratory status.        # Sinus Pause- 3.6 seconds on telemetry 9/29 while patient sleeping.  Discussed with EP  BB discontinued, felt may be related to apnea  -optimize CYRUS tx as outpatient.    - Ziopatch x14 days placed 10/7  -f/u Dr. Valadez- EP     # Afib/Aflutter- Noted to be in atrial fibrillation while in the ICU, felt possibly 2/2 to Dobutamine which was discontinued 9/23 and patient was given amiodarone bolus at that time. She converted to sinus. 9/25 she went into atrial flutter with variable block. Again was given an amio bolus, fluid bolus, and IV metop x1. Continues to intermittently go into a-fib - rates now controlled.   - holding BB due to sinus pause as above.  -  warfarin as above  -event monitor and EP f/u as above.     # HTN   SBP 90s-120s this am.  - Lasix 80 mg daily as listed above  - continue lisinopril 5 mg daily     #Sleep Apnea- with inability to tolerate CPAP in the past.   -monitor oxygen saturations as above  -consider overnight oximetry study prior to discharge   -should f/u in sleep medicine clinic as outpatient       # COPD- chronic bronchitis with tobacco abuse ~ 1 ppd prior to admission     # History of depression- long standing; was followed by psychology and psychiatry teams prior to admission.   - continue Cymbalta (it was held initially and then gradually titrated to home dose).   -per [facility] was on abilify 5 mg daily prior to admission (per psychiatrist at [facility]) will restart.      # HLD  - continue Atorvastatin 40mg     # Rheumatoid Arthritis- followed by Dr. Austin; diagnosed about 1.5 years ago.   - Resume PTA methotrexate/Wellcovorin   - Continue flexeril and tylenol prn     # Pannus Wound-  wound care as ordered.  # Wound at the left ECMO site: WOCN to follow.       1. Adjustment to disability:  Frustrated- consult health psychology  2. FEN: 2 gm na diet  3. Bowel: continent  4. Bladder: continent; PVRs completed and acceptable.   5. DVT Prophylaxis:  warfarin  6. GI Prophylaxis: ppi  7. Code: full  8. Disposition: goal for home   9. ELOS: undergoing therapy evaluations today-   10. Follow up Appointments on Discharge: pcp, cardiology, electrophysiology      Carli Salas PA-C  PM&R

## 2019-10-10 NOTE — PLAN OF CARE
OT: Pt with improving physical performance noted with strength and endurance. Therapist facilitated Pt's ambulation at SBA from room down to conference room and back for total of 300 feet with FWW requiring 2 sitting rest breaks. HEP established and training for Pt's completion 2-3x/day.

## 2019-10-10 NOTE — PROGRESS NOTES
"  General acute hospital   Acute Rehabilitation Unit  Daily progress note    interval history  Shira Rodriguez was seen and examined at bedside. She was doing well. Sleep was interrupted due to nocturia. Reported intermittent pain at anterior chest wall area. No new symptoms; no fever and chills. Reviewed her goals, current function, anticipated course and discharge plans.       Team rounds held today; please see separate document in Plan of Care tab for full details. Briefly, Shira is mod I in her room with FWW. Her endurance remains very poor due to cardiac issues and severe debility. Anticipate a few more days at ARU before discharge to home, possibly 10/15. Will have outpatient therapies / cardiac rehab after discharge.       medications    atorvastatin  40 mg Oral QPM     DULoxetine  60 mg Oral Daily     folic acid  4 mg Oral Daily     furosemide  80 mg Oral Daily     [START ON 10/10/2019] leucovorin  5 mg Oral Q7 Days     lisinopril  5 mg Oral Daily     methotrexate  25 mg Subcutaneous Q7 Days     multivitamin w/minerals  1 tablet Oral or Feeding Tube Daily     ranitidine  150 mg Oral BID     vitamin B1  100 mg Oral or Feeding Tube Daily        acetaminophen, cyclobenzaprine, diphenhydrAMINE-zinc acetate, - MEDICATION INSTRUCTIONS -, Warfarin Therapy Reminder     physical exam  /52 (BP Location: Right arm)   Pulse 96   Temp 98.6  F (37  C) (Oral)   Resp 16   Ht 1.707 m (5' 7.2\")   Wt (!) 169.6 kg (374 lb)   SpO2 95%   BMI 58.23 kg/m      Gen: NAD, sitting in chair   Cardio: RRR - Ziopatch in place  Pulm: clear breath sounds b/l   Abd: obese soft non distended  Ext: trace edema in bilateral lower extremities, chronic skin discoloration at anterior leg areas   Neuro/MSK: bilateral upper extremities with 4+/5 strength. HF 4-/5 and limited by body habitus. 4/5 distally in bilateral lower extremities. Intact sensation       Labs  INR 2.88      Rehabilitation - continue " comprehensive acute inpatient rehabilitation program with multidisciplinary approach including therapies, rehab nursing, and physiatry following. See interval history for updates.      assessment and plan    Shira Rodriguez is a 59 year old woman who presented to [facility] 9/9 via EMS with acute respiratory failure was intubated developed PEA arrest, found to have massive pulmonary embolism, echo with RV failure. Admitted to [facility] for ECMO and thrombectomy, course complicated by hypoxic respiratory failure, pleural effusions, pneumonia, thrombocytopenia, hypervolemia, and deconditioning. Admitted to acute rehab 10/7 for ongoing rehabilitation and medical management.      Past medical history significant for HTN, HLD, sleep apnea, morbid obesity, Rheumatoid Arthritis, Depression, and chronic bronchitis.    # PEA Arrest 2/2 massive pulmonary embolism-  # Acute Hypoxemic Respiratory Failure, improved- s/p [facility] ECMO IR thrombectomy 9/9 decannulated 9/17 with placement of VV ECMO 9/17-9/19   - respiratory status further complicated by  hx Chronic Bronchitis, Bilateral pleural effusions, hypervolemia, PNA (completed treatment).  -taper oxygen goal >88%  -encourage IS        # Bilateral Plueral Effusions, small L>R  # Volume Overload, likely 2/2 acute RV failure (resolved) and ECMO resuscitation, hypoalbumineam  -daily weights  -continue lasix per hospitalist titration  -monitor edema/respiratory status  -lymphedema wraps as needed.     # Sinus Pause- 3.6 seconds on telemetry 9/29 while patient sleeping.  Discussed with EP  BB discontinued, felt may be related to apnea  -optimize CYRUS tx as outpatient.    - Ziopatch x14 days placed 10/7  -f/u Dr. Valadez- EP     # Afib/Aflutter- Noted to be in atrial fibrillation while in the ICU, felt possibly 2/2 to Dobutamine which was discontinued 9/23 and patient was given amiodarone bolus at that time. She converted to sinus. 9/25 she went into atrial flutter with variable  block. Again was given an amio bolus, fluid bolus, and IV metop x1. Continues to intermittently go into a-fib - rates now controlled.   - holding BB due to sinus pause as above.  - warfarin as above  -event monitor and EP f/u as above.     # HTN   SBP 90s-120s this am.  - Lasix 80 mg daily as listed above  - hold lisinopril today- per hospitalist recs f/u bp     #Sleep Apnea- with inability to tolerate CPAP in the past.   -monitor oxygen saturations as above  -consider overnight oximetry study prior to discharge   -should f/u in sleep medicine clinic as outpatient       # COPD- chronic bronchitis with tobacco abuse ~ 1 ppd prior to admission     # History of depression- long standing; was followed by psychology and psychiatry teams prior to admission.   - continue Cymbalta (it was held initially and then gradually titrated to home dose). Reports that she was on a second medication for management of depression (wellbutrin ? Vs abilify ?). Chart was reviewed and cymbalta was the only medication listed. Should f/u with psychiatry team as outpatient.      # HLD  - continue Atorvastatin 40mg     # Rheumatoid Arthritis- followed by Dr. Austin; diagnosed about 1.5 years ago.   - Resume PTA methotrexate/Wellcovorin   - Continue flexeril and tylenol prn     # Pannus Wound-  wound care as ordered.  # Wound at the left ECMO site: WOCN to follow.         1. Adjustment to disability:  Frustrated- consult health psychology  2. FEN: 2 gm na diet  3. Bowel: continent  4. Bladder: continent; PVRs completed and acceptable.   5. DVT Prophylaxis:  warfarin  6. GI Prophylaxis: ppi  7. Code: full  8. Disposition: goal for home   9. ELOS: undergoing therapy evaluations today-   10. Follow up Appointments on Discharge: pcp, cardiology, electrophysiology      Chayito Cardona MD  Physical Medicine & Rehabilitation       Time Spent on this Encounter   I spent a total of 25 minutes face to face and coordinating care of Shira Rodriguez.  Over  50% of my time on the unit was spent counseling the patient and /or coordinating care; see note for details.

## 2019-10-10 NOTE — PLAN OF CARE
FOCUS/GOAL  Medical management    ASSESSMENT, INTERVENTIONS AND CONTINUING PLAN FOR GOAL:  Pt is alert and oriented. Mod I in room with walker. Denies shortness of breath/chest pain. Continent of bowel/bladder. 2 gm sodium diet. Denies pain. Declined full skin assessment, pt states all dressings were done on days, wasn't ready to get out of chair. Pt voiced some frustrations about early OT tomorrow, and having to move rooms. Support provided as able. Continue POC.

## 2019-10-10 NOTE — PLAN OF CARE
PT: Pt is making gradual progress with her ambulation distance and ability to recover from SANTOS, increased HR between activities.  Pt states she would prefer discharge Monday , as it will work for her sister's schedule.  MD aware, will cont to work out details of discharge planning. Pt still with goal of about 200 ft amb with fww, to be able to reach her apartment safely at discharge.  Cont toward goals.

## 2019-10-10 NOTE — PLAN OF CARE
Patient up in recliner during meals,requested Tylenol one time for body aches and she got relief.Participating in therapies.Groin wound cares not done this shift.

## 2019-10-10 NOTE — PLAN OF CARE
FOCUS/GOAL  Bowel management, Bladder management, and Medical management    ASSESSMENT, INTERVENTIONS AND CONTINUING PLAN FOR GOAL:  Pt slept well overnight. No c/o pain. MOD I with walker in room. NO alarms on this shift. Continent of B/B, LBM 10/8.

## 2019-10-11 ENCOUNTER — ANTICOAGULATION THERAPY VISIT (OUTPATIENT)
Dept: ANTICOAGULATION | Facility: CLINIC | Age: 59
End: 2019-10-11

## 2019-10-11 DIAGNOSIS — I26.99 PULMONARY EMBOLISM, UNSPECIFIED CHRONICITY, UNSPECIFIED PULMONARY EMBOLISM TYPE, UNSPECIFIED WHETHER ACUTE COR PULMONALE PRESENT (H): ICD-10-CM

## 2019-10-11 LAB — INR PPP: 2.94 (ref 0.86–1.14)

## 2019-10-11 PROCEDURE — 36415 COLL VENOUS BLD VENIPUNCTURE: CPT | Performed by: PHYSICIAN ASSISTANT

## 2019-10-11 PROCEDURE — 25000132 ZZH RX MED GY IP 250 OP 250 PS 637: Performed by: PHYSICAL MEDICINE & REHABILITATION

## 2019-10-11 PROCEDURE — 12800006 ZZH R&B REHAB

## 2019-10-11 PROCEDURE — 97110 THERAPEUTIC EXERCISES: CPT | Mod: GO

## 2019-10-11 PROCEDURE — 97110 THERAPEUTIC EXERCISES: CPT | Mod: GP

## 2019-10-11 PROCEDURE — 97535 SELF CARE MNGMENT TRAINING: CPT | Mod: GO

## 2019-10-11 PROCEDURE — 25000132 ZZH RX MED GY IP 250 OP 250 PS 637: Performed by: PHYSICIAN ASSISTANT

## 2019-10-11 PROCEDURE — 97110 THERAPEUTIC EXERCISES: CPT | Mod: GP | Performed by: REHABILITATION PRACTITIONER

## 2019-10-11 PROCEDURE — 85610 PROTHROMBIN TIME: CPT | Performed by: PHYSICIAN ASSISTANT

## 2019-10-11 RX ORDER — DULOXETIN HYDROCHLORIDE 60 MG/1
60 CAPSULE, DELAYED RELEASE ORAL DAILY
Qty: 30 CAPSULE | Refills: 0 | Status: SHIPPED | OUTPATIENT
Start: 2019-10-12

## 2019-10-11 RX ORDER — LANOLIN ALCOHOL/MO/W.PET/CERES
100 CREAM (GRAM) TOPICAL DAILY
Qty: 30 TABLET | Refills: 0 | Status: SHIPPED | OUTPATIENT
Start: 2019-10-11 | End: 2019-11-20

## 2019-10-11 RX ORDER — METHOTREXATE 25 MG/ML
25 INJECTION, SOLUTION INTRA-ARTERIAL; INTRAMUSCULAR; INTRAVENOUS
Qty: 4 ML | Refills: 0 | Status: SHIPPED | OUTPATIENT
Start: 2019-10-16 | End: 2019-10-23

## 2019-10-11 RX ORDER — MULTIPLE VITAMINS W/ MINERALS TAB 9MG-400MCG
1 TAB ORAL DAILY
Qty: 30 TABLET | Refills: 0 | Status: SHIPPED | OUTPATIENT
Start: 2019-10-11 | End: 2019-11-20

## 2019-10-11 RX ORDER — FOLIC ACID 1 MG/1
4 TABLET ORAL DAILY
Qty: 120 TABLET | Refills: 0 | Status: SHIPPED | OUTPATIENT
Start: 2019-10-11 | End: 2019-10-23

## 2019-10-11 RX ORDER — WARFARIN SODIUM 5 MG/1
5 TABLET ORAL
Status: COMPLETED | OUTPATIENT
Start: 2019-10-11 | End: 2019-10-11

## 2019-10-11 RX ORDER — ACETAMINOPHEN 325 MG/1
650 TABLET ORAL EVERY 6 HOURS PRN
COMMUNITY
Start: 2019-10-11

## 2019-10-11 RX ORDER — LISINOPRIL 5 MG/1
5 TABLET ORAL DAILY
Qty: 30 TABLET | Refills: 0 | Status: SHIPPED | OUTPATIENT
Start: 2019-10-12 | End: 2019-11-20

## 2019-10-11 RX ORDER — ATORVASTATIN CALCIUM 40 MG/1
40 TABLET, FILM COATED ORAL EVERY EVENING
Qty: 30 TABLET | Refills: 0 | Status: SHIPPED | OUTPATIENT
Start: 2019-10-11 | End: 2019-11-20

## 2019-10-11 RX ORDER — ARIPIPRAZOLE 5 MG/1
5 TABLET ORAL DAILY
Qty: 30 TABLET | Refills: 0 | Status: SHIPPED | OUTPATIENT
Start: 2019-10-12 | End: 2019-11-20

## 2019-10-11 RX ORDER — LEUCOVORIN CALCIUM 5 MG/1
5 TABLET ORAL
Qty: 4 TABLET | Refills: 0 | Status: SHIPPED | OUTPATIENT
Start: 2019-10-17 | End: 2019-10-23

## 2019-10-11 RX ORDER — DIPHENHYDRAMINE HYDROCHLORIDE, ZINC ACETATE 2; .1 G/100G; G/100G
CREAM TOPICAL 3 TIMES DAILY PRN
COMMUNITY
Start: 2019-10-11 | End: 2019-10-23

## 2019-10-11 RX ADMIN — WARFARIN SODIUM 5 MG: 5 TABLET ORAL at 19:06

## 2019-10-11 RX ADMIN — MULTIPLE VITAMINS W/ MINERALS TAB 1 TABLET: TAB at 08:32

## 2019-10-11 RX ADMIN — DULOXETINE HYDROCHLORIDE 60 MG: 60 CAPSULE, DELAYED RELEASE ORAL at 08:32

## 2019-10-11 RX ADMIN — RANITIDINE 150 MG: 150 TABLET ORAL at 20:48

## 2019-10-11 RX ADMIN — ARIPIPRAZOLE 5 MG: 5 TABLET ORAL at 08:32

## 2019-10-11 RX ADMIN — FOLIC ACID 4 MG: 1 TABLET ORAL at 08:32

## 2019-10-11 RX ADMIN — LISINOPRIL 5 MG: 5 TABLET ORAL at 08:32

## 2019-10-11 RX ADMIN — ATORVASTATIN CALCIUM 40 MG: 40 TABLET, FILM COATED ORAL at 20:48

## 2019-10-11 RX ADMIN — RANITIDINE 150 MG: 150 TABLET ORAL at 08:32

## 2019-10-11 RX ADMIN — THIAMINE HCL TAB 100 MG 100 MG: 100 TAB at 08:32

## 2019-10-11 RX ADMIN — FUROSEMIDE 80 MG: 40 TABLET ORAL at 08:32

## 2019-10-11 NOTE — PLAN OF CARE
FOCUS/GOAL  Medical management    ASSESSMENT, INTERVENTIONS AND CONTINUING PLAN FOR GOAL:  Pt slept well overnight. MOD I with walker in the room. No c/o pain this shift. Pt declined shower on Thursday and wanted to have a shower on Friday morning.

## 2019-10-11 NOTE — PLAN OF CARE
Patient alert and oriented, denies pain this shift. Mod I in room. Continent of bowel and bladder. LBM 10/10/19. Wound care provided to groin (left/right) and new dressings applied. Patient requests that dressings be changed tomorrow around noon because that is when her mom is available for teaching. Call light within reach. Ok to continue with care plan.

## 2019-10-11 NOTE — PROGRESS NOTES
ARU Care Coordinator Progress Note    Admission date: 10/07/2019    Data: Shira Rodriguez is a 60 yo female on ARU for rehab following hospitalization for pulmonary embolism, PEA arrest, respiratory failure.    Intervention: Met with patient to introduce the role of Care Coordinator and to begin discussion of anticipated discharge planning needs. Spoke to [place] at [facility] to inquire if wound care supplies would be covered by insurance, was told she has no wound care supply coverage. Updated Dr. Busby's nurse Yuridia on patient status and discharge plan, confirmed [facility] can manage Coumadin with PCP Dr. Busby, [facility] referral initiated.    Assessment: Patient lives alone but mother will stay with her when she first gets home. Nephew will provide rides home and to appointments. Mother is learning L groin and pannus wound cares with ARU nursing. Reviewed wound care supplies purchase options with patient, she will order from [employer]. ARU nursing only able to send short supply wound care supplies with patient upon discharge. She will have INR checks done at [facility]. She has been on Coumadin in the past, but would benefit from PLC review, notified ARU charge nurse of need for PLC consult. She currently has Ziopatch, states understanding goal is for Ziopatch for 2 weeks, but she states she might remove it earlier since it is so itchy. She has mail back instructions and will mail the Ziopatch in when completed. She has CPAP at home but does not use as she does not tolerate it, understands Sleep Medicine follow-up recommended. She has the following follow-up appointments already scheduled: PCP Dr. Busby on 10/21/2019, Cardiology NP on 10/18/2019, Cardiology Dr. Mcfarlane on 12/2/2019, Rheumatology on 10/25/2019, though she would like to reschedule Rheum appointment for a later time. She will also need EP follow-up and INR lab draw appointments.    Plan: Will continue to follow  discharge planning needs throughout ARU stay. Current target discharge date is 10/14/2019.    Jovan Thomas RN, BSN, Patient Care Management Coordinator  [facility]  [address], [address] [place] [address]  [email or web address]  [email or web address]   Desk: [phone] U [address] [phone] Fax [phone] Pager [phone]

## 2019-10-11 NOTE — PLAN OF CARE
"Discharge Planner PT   Patient plan for discharge: Home with OP PT on Monday 10/11. Planning for IND Day and AM therapies on day of discharge.  Current status: MOD I in room with WW, but significantly limited by endurance and fatigue at longer distances. Working on strength and durability in order to return to home with support from mother and OP therapies.     Pt is anxious about OP therapies, but has to amb ~200 ft with access her apartment unit from the front door, and would no longer qualify for home care at that point. Encouraging pt to look at her walks to appointments and trips into the community as \"extra therapies\" and breaking her cycle of immobility prior to admission.     DME needs: none - pt has walker from home         Entered by: Vicky Pratt 10/11/2019 4:23 PM     "

## 2019-10-11 NOTE — PROGRESS NOTES
"  [facility]   Acute Rehabilitation Unit  Daily progress note    interval history  Shira Rodriguez was seen sitting up in chair reports improved breathing, improved activity tolerance, and desire to go home.  We discussed ongoing monitoring and follow up, Shira finds this quite overwhelming, noting she will need to arrange rides and just feels like a lot.  She denies other new physical complaints, is worried about maintaining level of physical activity upon discharge.  \"It will be easier to just snuggle in\" than to do prescribed program. We reviewed medications for home, encouraged patient to continue to vocalize concerns, with goal to discharge patient home as confident as possible, and set up to succeed upon discharge.     Per OT: Pt with improving physical performance noted with strength and endurance. Therapist facilitated Pt's ambulation at A from room down to conference room and back for total of 300 feet with FWW requiring 2 sitting rest breaks. HEP established and training for Pt's completion 2-3x/day.    medications    ARIPiprazole  5 mg Oral Daily     atorvastatin  40 mg Oral QPM     DULoxetine  60 mg Oral Daily     folic acid  4 mg Oral Daily     furosemide  80 mg Oral Daily     leucovorin  5 mg Oral Q7 Days     lisinopril  5 mg Oral Daily     methotrexate  25 mg Subcutaneous Q7 Days     multivitamin w/minerals  1 tablet Oral or Feeding Tube Daily     ranitidine  150 mg Oral BID     vitamin B1  100 mg Oral or Feeding Tube Daily        acetaminophen, cyclobenzaprine, diphenhydrAMINE-zinc acetate, - MEDICATION INSTRUCTIONS -, Warfarin Therapy Reminder     physical exam  /60 (BP Location: Left arm)   Pulse 90   Temp 96.8  F (36  C) (Oral)   Resp 18   Ht 1.707 m (5' 7.2\")   Wt (!) 169.6 kg (374 lb)   SpO2 94%   BMI 58.23 kg/m      Gen: NAD, sitting in chair   Cardio: RRR - Ziopatch in place  Pulm: trace lll crackles right clear  Abd: obese soft non " distended  Ext: trace edema in bilateral lower extremities, chronic skin discoloration at anterior leg areas   Neuro/MSK: up ambulating with walker      Labs  Lab Results   Component Value Date    WBC 5.8 10/10/2019     Lab Results   Component Value Date    RBC 3.67 10/10/2019     Lab Results   Component Value Date    HGB 10.5 10/10/2019     Lab Results   Component Value Date    HCT 34.1 10/10/2019     Lab Results   Component Value Date    MCV 93 10/10/2019     Lab Results   Component Value Date    MCH 28.6 10/10/2019     Lab Results   Component Value Date    MCHC 30.8 10/10/2019     Lab Results   Component Value Date    RDW 16.8 10/10/2019     Lab Results   Component Value Date     10/10/2019     Last Comprehensive Metabolic Panel:  Sodium   Date Value Ref Range Status   10/10/2019 141 133 - 144 mmol/L Final     Potassium   Date Value Ref Range Status   10/10/2019 3.8 3.4 - 5.3 mmol/L Final     Chloride   Date Value Ref Range Status   10/10/2019 108 94 - 109 mmol/L Final     Carbon Dioxide   Date Value Ref Range Status   10/10/2019 24 20 - 32 mmol/L Final     Anion Gap   Date Value Ref Range Status   10/10/2019 9 3 - 14 mmol/L Final     Glucose   Date Value Ref Range Status   10/10/2019 108 (H) 70 - 99 mg/dL Final     Urea Nitrogen   Date Value Ref Range Status   10/10/2019 20 7 - 30 mg/dL Final     Creatinine   Date Value Ref Range Status   10/10/2019 0.77 0.52 - 1.04 mg/dL Final     GFR Estimate   Date Value Ref Range Status   10/10/2019 85 >60 mL/min/[1.73_m2] Final     Comment:     Non  GFR Calc  Starting 12/18/2018, serum creatinine based estimated GFR (eGFR) will be   calculated using the Chronic Kidney Disease Epidemiology Collaboration   (CKD-EPI) equation.       Calcium   Date Value Ref Range Status   10/10/2019 9.2 8.5 - 10.1 mg/dL Final           Rehabilitation - continue comprehensive acute inpatient rehabilitation program with multidisciplinary approach including therapies, rehab  nursing, and physiatry following. See interval history for updates.      assessment and plan    Shira Rodriguez is a 59 year old woman who presented to [facility] 9/9 via EMS with acute respiratory failure was intubated developed PEA arrest, found to have massive pulmonary embolism, echo with RV failure. [facility], course complicated by hypoxic respiratory failure, pleural effusions, pneumonia, thrombocytopenia, hypervolemia, and deconditioning. Admitted to acute rehab 10/7 for ongoing rehabilitation and medical management.      Past medical history significant for HTN, HLD, sleep apnea, morbid obesity, Rheumatoid Arthritis, Depression, and chronic bronchitis.    # PEA Arrest 2/2 massive pulmonary embolism-  # Acute Hypoxemic Respiratory Failure, improved- s/p VA ECMO IR thrombectomy 9/9 decannulated 9/17 with placement of VV ECMO 9/17-9/19   - respiratory status further complicated by  hx Chronic Bronchitis, Bilateral pleural effusions, hypervolemia, PNA (completed treatment). Stable on room air  -encourage IS  -lifelong anticoagulation recommended currently on warfarin inr goal 2-3   -will need warfarin education during stay and establish at warfarin clinic upon discharge        # Bilateral Plueral Effusions, small L>R  # Volume Overload, likely 2/2 acute RV failure (resolved) and ECMO resuscitation, hypoalbuminemia  -daily weights  -continue lasix 80 mg daily- monitor electrolytes weight and respiratory status.        # Sinus Pause- 3.6 seconds on telemetry 9/29 while patient sleeping.  Discussed with EP  BB discontinued, felt may be related to apnea  -optimize CYRUS tx as outpatient.    - Ziopatch x14 days placed 10/7- patient finding very itchy may remove prior to recommended time- encourage to keep on as long as tolerated.   -f/u Dr. Valadez- EP     # Afib/Aflutter- Noted to be in atrial fibrillation while in the ICU, felt possibly 2/2 to Dobutamine which was  discontinued 9/23 and patient was given amiodarone bolus at that time. She converted to sinus. 9/25 she went into atrial flutter with variable block. Again was given an amio bolus, fluid bolus, and IV metop x1. Continues to intermittently go into a-fib - rates now controlled.   - holding BB due to sinus pause as above.  - warfarin as above  -event monitor and EP f/u as above.     # HTN   SBP 90s-120s this am.  - Lasix 80 mg daily as listed above  - continue lisinopril 5 mg daily     #Sleep Apnea- with inability to tolerate CPAP in the past reports sleep study in 4/2019.   -should f/u in sleep medicine clinic as outpatient       # COPD- chronic bronchitis with tobacco abuse ~ 1 ppd prior to admission     # History of depression- long standing; was followed by psychology and psychiatry teams prior to admission.   - continue Cymbalta (it was held initially and then gradually titrated to home dose).   -per review of [facility] medication list was on abilify 5 mg daily prior to admission (per [facility] at [facility]) will restart.      # HLD  - continue Atorvastatin 40mg     # Rheumatoid Arthritis- followed by Dr. Austin; diagnosed about 1.5 years ago.   - continue PTA methotrexate/Wellcovorin   - Continue flexeril and tylenol prn     # Pannus Wound-  wound care as ordered.  # Wound at the left ECMO site: WOCN to follow.       1. Adjustment to disability:  Frustrated- consult health psychology  2. FEN: 2 gm na diet  3. Bowel: continent  4. Bladder: continent; PVRs completed and acceptable.   5. DVT Prophylaxis:  warfarin  6. GI Prophylaxis: ppi  7. Code: full  8. Disposition: goal for home 10/14  9. ELOS: ~10/14  10. Follow up Appointments on Discharge: pcp, cardiology, electrophysiology, sleep medicine, warfarin clinic      Carli Salas PA-C  PM&R    I spent a total of 40 minutes face-to-face or managing the care of Shira Rodriguez. Over 50% of my time on the unit was spent counseling the patient and  coordinating care. See note for details.

## 2019-10-11 NOTE — PLAN OF CARE
OT: Mod I for ambulation to shower room 100 feet with FWW. Shower transfer Mod I and Pt required Min A for bathing due to assist needed with washing/drying feet. Pt to pursue long handled sponge as a means to improve performance and independence. Pt was Mod I with UB/LB dressing. Education regarding upcoming d/c and performance at home. Pt presents independent with established HEP. Pt attempted hot meal prep though too fatigued at end of day.

## 2019-10-11 NOTE — PROGRESS NOTES
ADDENDUM from 10/14:  Voicemail is left from Jovan in Rehab Dept stating plan is for pt to be discharged today.  The next INR to be obtained on 10/16 @ [facility].  We will follow up post discharge.  Husam CORRAL          Referral Date:10/11/19  Physician Responsible for Anticoagulation:Dr. Anjel Busby  Indication: Massive PE                              Goal Range: 2.0-3.0     Duration: Lifelong  INR Referral is in EPIC:    Yes    Standing Lab Orders are in EPIC:   Yes  Patient Contacted: Unable to per pt is hospitalized  HIPAA Release Form Sent:   Yes  Welcome Letter and Warfarin Educational Packet Sent:  Yes

## 2019-10-12 LAB — INR PPP: 2.66 (ref 0.86–1.14)

## 2019-10-12 PROCEDURE — 25000132 ZZH RX MED GY IP 250 OP 250 PS 637: Performed by: PHYSICIAN ASSISTANT

## 2019-10-12 PROCEDURE — 25000132 ZZH RX MED GY IP 250 OP 250 PS 637: Performed by: PHYSICAL MEDICINE & REHABILITATION

## 2019-10-12 PROCEDURE — 12800006 ZZH R&B REHAB

## 2019-10-12 PROCEDURE — 97535 SELF CARE MNGMENT TRAINING: CPT | Mod: GO

## 2019-10-12 PROCEDURE — 36415 COLL VENOUS BLD VENIPUNCTURE: CPT | Performed by: PHYSICIAN ASSISTANT

## 2019-10-12 PROCEDURE — 97110 THERAPEUTIC EXERCISES: CPT | Mod: GP | Performed by: PHYSICAL THERAPIST

## 2019-10-12 PROCEDURE — 97530 THERAPEUTIC ACTIVITIES: CPT | Mod: GO | Performed by: OCCUPATIONAL THERAPIST

## 2019-10-12 PROCEDURE — 85610 PROTHROMBIN TIME: CPT | Performed by: PHYSICIAN ASSISTANT

## 2019-10-12 RX ORDER — WARFARIN SODIUM 7.5 MG/1
7.5 TABLET ORAL
Status: COMPLETED | OUTPATIENT
Start: 2019-10-12 | End: 2019-10-12

## 2019-10-12 RX ADMIN — ARIPIPRAZOLE 5 MG: 5 TABLET ORAL at 09:05

## 2019-10-12 RX ADMIN — RANITIDINE 150 MG: 150 TABLET ORAL at 09:05

## 2019-10-12 RX ADMIN — THIAMINE HCL TAB 100 MG 100 MG: 100 TAB at 09:06

## 2019-10-12 RX ADMIN — FOLIC ACID 4 MG: 1 TABLET ORAL at 09:05

## 2019-10-12 RX ADMIN — MULTIPLE VITAMINS W/ MINERALS TAB 1 TABLET: TAB at 09:06

## 2019-10-12 RX ADMIN — LISINOPRIL 5 MG: 5 TABLET ORAL at 09:06

## 2019-10-12 RX ADMIN — RANITIDINE 150 MG: 150 TABLET ORAL at 21:43

## 2019-10-12 RX ADMIN — ACETAMINOPHEN 650 MG: 325 TABLET, FILM COATED ORAL at 09:50

## 2019-10-12 RX ADMIN — DULOXETINE HYDROCHLORIDE 60 MG: 60 CAPSULE, DELAYED RELEASE ORAL at 09:05

## 2019-10-12 RX ADMIN — WARFARIN SODIUM 7.5 MG: 7.5 TABLET ORAL at 18:16

## 2019-10-12 RX ADMIN — ATORVASTATIN CALCIUM 40 MG: 40 TABLET, FILM COATED ORAL at 21:43

## 2019-10-12 RX ADMIN — FUROSEMIDE 80 MG: 40 TABLET ORAL at 09:06

## 2019-10-12 ASSESSMENT — ENCOUNTER SYMPTOMS
MEMORY LOSS: 0
SEIZURES: 0
PALPITATIONS: 1
SORE THROAT: 0
TREMORS: 0
DIZZINESS: 0
COUGH DISTURBING SLEEP: 0
POLYPHAGIA: 0
NIGHT SWEATS: 0
PARALYSIS: 0
LOSS OF CONSCIOUSNESS: 0
SLEEP DISTURBANCES DUE TO BREATHING: 0
HEMOPTYSIS: 0
JOINT SWELLING: 0
HOARSE VOICE: 1
SHORTNESS OF BREATH: 1
WEIGHT GAIN: 0
SNORES LOUDLY: 0
INCREASED ENERGY: 1
DYSPNEA ON EXERTION: 1
TINGLING: 0
NECK MASS: 0
TROUBLE SWALLOWING: 0
LIGHT-HEADEDNESS: 0
COUGH: 1
SINUS PAIN: 0
ORTHOPNEA: 1
NECK PAIN: 0
MUSCLE CRAMPS: 0
WEAKNESS: 1
SMELL DISTURBANCE: 0
MUSCLE WEAKNESS: 1
WEIGHT LOSS: 1
HALLUCINATIONS: 0
BACK PAIN: 1
MYALGIAS: 0
NUMBNESS: 0
EXERCISE INTOLERANCE: 1
FATIGUE: 1
HYPOTENSION: 1
DECREASED APPETITE: 0
SINUS CONGESTION: 1
SWOLLEN GLANDS: 0
DISTURBANCES IN COORDINATION: 0
TASTE DISTURBANCE: 0
POSTURAL DYSPNEA: 1
LEG PAIN: 0
STIFFNESS: 1
SPUTUM PRODUCTION: 1
HEADACHES: 0
ALTERED TEMPERATURE REGULATION: 0
CHILLS: 0
ARTHRALGIAS: 1
SYNCOPE: 0
SPEECH CHANGE: 0
FEVER: 0
BRUISES/BLEEDS EASILY: 1
POLYDIPSIA: 0
WHEEZING: 0

## 2019-10-12 ASSESSMENT — MIFFLIN-ST. JEOR: SCORE: 2313.16

## 2019-10-12 NOTE — PROGRESS NOTES
PM&R PROGRESS NOTE     Patient Active Problem List   Diagnosis     Primary localized osteoarthrosis, pelvic region and thigh     Adjustment disorder with depressed mood     Tobacco use disorder     Morbid obesity (H)     Arthritis of knee, degenerative     Degeneration of cervical intervertebral disc     CYRUS (obstructive sleep apnea)     Elevated blood pressure (not hypertension)     Pulmonary emboli (H)     Rheumatoid arthritis (H)     Cardiac arrest (H)       SHOLA Rodriguez is a 59 year old female admitted to the ARU on 10/7/2019, for deconditioning debility following a prolonged hospitalization for PEA arrest, PE, right ventricular failure requiring ECMO and thrombectomy other issues have included pleural effusion pneumonia thrombocytopenia and hypervolemia.    She has a history of  Hypertension, hyperlipidemia sleep apnea morbid obesity and depression    From the functional perspective, patient continues to be motivated.  She was seen today once in her room, and again she participated in therapy.  She is ambulating with a walker, gait mechanics are optimal.  She is working with OT with basic IADLs and ADLs and is noted to be modified.  She is limited by fatigue and endurance.  Tends to be out of breath with minimal exertion  She is currently targeted to discharge on Monday with outpatient PT and OT.    On my examination today she notes hoarse voice which is improving  Morbidly obese  Muscle strength examination is consistent with bilaterally symmetrical proximal weakness      Orders Placed This Encounter      Combination Diet 2 gm NA Diet      Diet        Review Of Systems  Total of ten systems reviewed, pertinent positives and negatives as follows  Fatigue  Not constipated  Appetite is good  He is improving in strength  Out of breath with minimal exertion  Denies any chest pain, fever or chills  Remainder of the review of the systems was negative.        /78 (BP Location: Left arm)   Pulse 111    "Temp 95.9  F (35.5  C) (Oral)   Resp 16   Ht 1.707 m (5' 7.2\")   Wt (!) 169.7 kg (374 lb 3.2 oz)   SpO2 93%   BMI 58.26 kg/m    Physical exam    Patient is lying in bed comfortable in no acute distress   HEENT NC AT PRTL EOM good   Neck supple  Heart S1S2  Lungs CTA  Abdomen  benign BS positive NT NR   LE edema         Current Facility-Administered Medications   Medication     acetaminophen (TYLENOL) tablet 650 mg     ARIPiprazole (ABILIFY) tablet 5 mg     atorvastatin (LIPITOR) tablet 40 mg     cyclobenzaprine (FLEXERIL) tablet 10 mg     diphenhydrAMINE-zinc acetate (BENADRYL) 2-0.1 % cream     DULoxetine (CYMBALTA) DR capsule 60 mg     folic acid (FOLVITE) tablet 4 mg     furosemide (LASIX) tablet 80 mg     leucovorin (WELLCOVORIN) tablet 5 mg     lisinopril (PRINIVIL/ZESTRIL) tablet 5 mg     methotrexate injection non-oncology use (chemotherapy) 25 mg     multivitamin w/minerals (THERA-VIT-M) tablet 1 tablet     Patient is already receiving anticoagulation with heparin, enoxaparin (LOVENOX), warfarin (COUMADIN)  or other anticoagulant medication     ranitidine (ZANTAC) tablet 150 mg     vitamin B1 (THIAMINE) tablet 100 mg     warfarin ANTICOAGULANT (COUMADIN) tablet 7.5 mg     Warfarin Therapy Reminder (Check START DATE - warfarin may be starting in the FUTURE)        Labs:  Lab Results   Component Value Date    WBC 5.8 10/10/2019    HGB 10.5 (L) 10/10/2019    HCT 34.1 (L) 10/10/2019     10/10/2019     10/10/2019    POTASSIUM 3.8 10/10/2019    CHLORIDE 108 10/10/2019    CO2 24 10/10/2019    BUN 20 10/10/2019    CR 0.77 10/10/2019     (H) 10/10/2019     (H) 09/21/2019    DD >20.0 (H) 09/19/2019    NTBNP 217 (H) 12/20/2016    TROPI <0.015 09/23/2019    AST 16 10/10/2019    ALT 21 10/10/2019    ALKPHOS 73 10/10/2019    BILITOTAL 0.4 10/10/2019    INR 2.66 (H) 10/12/2019       Attestation:  This patient has been seen and evaluated by me, Jessie Anderson MD.    Total time: 25 " Minutes more than 50% in Care coordination / counseling time  Jessie Anderson MD

## 2019-10-12 NOTE — PLAN OF CARE
Pt alert and oriented x4. Independent in room with walker. Continent of bladder and bowel. LBM: 10/10. 2 gram sodium diet, thin liquids. Tolerating well. Redness of abdominal folds. Intradry replaced. UTV (L) groin incision. Plan is for patient to discharge on Monday. Uses call light appropriately. Will continue with plan of care.

## 2019-10-12 NOTE — PLAN OF CARE
FOCUS/GOAL  Bowel management, Bladder management, and Pain management    ASSESSMENT, INTERVENTIONS AND CONTINUING PLAN FOR GOAL:   Patient slept well on the overnight, denies pain or sob. Using call light appropriately. Mod I in the room , continent of bowel and bladder. Continue with plan of care.

## 2019-10-12 NOTE — CONSULTS
10/12/19 1056 Whitley Mello, RN       Patient and mom seen at bedside for Warfarin class.   No education note entered.

## 2019-10-12 NOTE — PLAN OF CARE
OT: hot meal prep in kitchen completed with mod I ; fried eggs on stove top. EC tech explained. pt using a chair in kitchen to rest when fatigued. Pt needs to work on cleaning, bed making and laundry

## 2019-10-13 LAB — INR PPP: 2.71 (ref 0.86–1.14)

## 2019-10-13 PROCEDURE — 85610 PROTHROMBIN TIME: CPT | Performed by: PHYSICIAN ASSISTANT

## 2019-10-13 PROCEDURE — 12800006 ZZH R&B REHAB

## 2019-10-13 PROCEDURE — 25000132 ZZH RX MED GY IP 250 OP 250 PS 637: Performed by: PHYSICIAN ASSISTANT

## 2019-10-13 PROCEDURE — 25000132 ZZH RX MED GY IP 250 OP 250 PS 637: Performed by: PHYSICAL MEDICINE & REHABILITATION

## 2019-10-13 PROCEDURE — 97110 THERAPEUTIC EXERCISES: CPT | Mod: GP | Performed by: PHYSICAL THERAPIST

## 2019-10-13 PROCEDURE — 36415 COLL VENOUS BLD VENIPUNCTURE: CPT | Performed by: PHYSICIAN ASSISTANT

## 2019-10-13 RX ORDER — WARFARIN SODIUM 5 MG/1
5 TABLET ORAL
Status: COMPLETED | OUTPATIENT
Start: 2019-10-13 | End: 2019-10-13

## 2019-10-13 RX ADMIN — ATORVASTATIN CALCIUM 40 MG: 40 TABLET, FILM COATED ORAL at 20:12

## 2019-10-13 RX ADMIN — ARIPIPRAZOLE 5 MG: 5 TABLET ORAL at 08:12

## 2019-10-13 RX ADMIN — MULTIPLE VITAMINS W/ MINERALS TAB 1 TABLET: TAB at 08:12

## 2019-10-13 RX ADMIN — RANITIDINE 150 MG: 150 TABLET ORAL at 08:12

## 2019-10-13 RX ADMIN — FUROSEMIDE 80 MG: 40 TABLET ORAL at 08:11

## 2019-10-13 RX ADMIN — DULOXETINE HYDROCHLORIDE 60 MG: 60 CAPSULE, DELAYED RELEASE ORAL at 08:12

## 2019-10-13 RX ADMIN — LISINOPRIL 5 MG: 5 TABLET ORAL at 08:11

## 2019-10-13 RX ADMIN — WARFARIN SODIUM 5 MG: 5 TABLET ORAL at 17:18

## 2019-10-13 RX ADMIN — FOLIC ACID 4 MG: 1 TABLET ORAL at 08:11

## 2019-10-13 RX ADMIN — THIAMINE HCL TAB 100 MG 100 MG: 100 TAB at 08:11

## 2019-10-13 RX ADMIN — RANITIDINE 150 MG: 150 TABLET ORAL at 20:12

## 2019-10-13 NOTE — PHARMACY-ANTICOAGULATION SERVICE
Clinical Pharmacy- Warfarin Discharge Note  This patient is currently on warfarin for the treatment of PE.  INR Goal= 2-3  Expected length of therapy lifetime.    Warfarin PTA Regimen: Started in prior hospital stay. Please see flowchart       Anticoagulation Dose History     Recent Dosing and Labs Latest Ref Rng & Units 10/7/2019 10/8/2019 10/9/2019 10/10/2019 10/11/2019 10/12/2019 10/13/2019    ZZ IMS TEMPLATE - - 7.5 mg - 4 mg - - -    Warfarin 3 mg - - - 6 mg - - - -    Warfarin 5 mg - 10 mg - - - 5 mg - -    Warfarin 7.5 mg - - - - - - 7.5 mg -    INR 0.86 - 1.14 2.22(H) 2.50(H) 2.88(H) 2.96(H) 2.94(H) 2.66(H) 2.71(H)          Vitamin K doses administered during the last 7 days: none  FFP administered during the last 7 days: none  Recommend discharging the patient on a warfarin regimen of 7.5 mg on Mondays, Wednesdays, and Fridays, and 5 mg all other days of the week with a prescription for warfarin 5mg tablets.      The patient should have an INR checked in 3-4 days.    Hubert Robbins, PharmD  10/13/19

## 2019-10-13 NOTE — PLAN OF CARE
Occupational Therapy Discharge Summary    Reason for therapy discharge:    Discharged to home.    Progress towards therapy goal(s). See goals on Care Plan in [facility] electronic health record for goal details.  Goals met    Therapy recommendation(s):    No further therapy is recommended. Pt is mod I for basic adl. No additional OT recommended at this time unless there is functional decline. Pt is declining AE for dressing. Pt's mom will stay with her initially for support.

## 2019-10-13 NOTE — PLAN OF CARE
AOX4, uses call light appropriately and able to make needs known. Mod I in room with walker. CO of sternal pain and HA managed with PRN tylenol with relief. Dressing on left groin wound changed, no sign of infection. Denies nausea or SOB, pleasant and cooperative with cares. Continue with POC.

## 2019-10-13 NOTE — PLAN OF CARE
AxOx4. Calls appropriately. Mod I in room with walker. Pt reports LBM 10/11. Denies pain, SOB, N/T, nausea. Pt slept most of shift. No new concerns. Continue POC.

## 2019-10-13 NOTE — PLAN OF CARE
PT: Pt scheduled one more time for PT for I day, wrap up therapy.  Pt was able to make it from her room to gym on one occasion today, with 1 standing rest break, sats WNL.

## 2019-10-14 VITALS
DIASTOLIC BLOOD PRESSURE: 66 MMHG | HEIGHT: 67 IN | OXYGEN SATURATION: 95 % | HEART RATE: 94 BPM | BODY MASS INDEX: 45.99 KG/M2 | RESPIRATION RATE: 20 BRPM | WEIGHT: 293 LBS | SYSTOLIC BLOOD PRESSURE: 131 MMHG | TEMPERATURE: 97.3 F

## 2019-10-14 DIAGNOSIS — I10 ESSENTIAL HYPERTENSION: ICD-10-CM

## 2019-10-14 LAB
ANION GAP SERPL CALCULATED.3IONS-SCNC: 7 MMOL/L (ref 3–14)
BASOPHILS # BLD AUTO: 0 10E9/L (ref 0–0.2)
BASOPHILS NFR BLD AUTO: 0.2 %
BUN SERPL-MCNC: 16 MG/DL (ref 7–30)
CALCIUM SERPL-MCNC: 8.8 MG/DL (ref 8.5–10.1)
CHLORIDE SERPL-SCNC: 109 MMOL/L (ref 94–109)
CO2 SERPL-SCNC: 26 MMOL/L (ref 20–32)
CREAT SERPL-MCNC: 0.58 MG/DL (ref 0.52–1.04)
DIFFERENTIAL METHOD BLD: ABNORMAL
EOSINOPHIL # BLD AUTO: 0.2 10E9/L (ref 0–0.7)
EOSINOPHIL NFR BLD AUTO: 4.2 %
ERYTHROCYTE [DISTWIDTH] IN BLOOD BY AUTOMATED COUNT: 16.3 % (ref 10–15)
GFR SERPL CREATININE-BSD FRML MDRD: >90 ML/MIN/{1.73_M2}
GLUCOSE SERPL-MCNC: 99 MG/DL (ref 70–99)
HCT VFR BLD AUTO: 33.3 % (ref 35–47)
HGB BLD-MCNC: 10.2 G/DL (ref 11.7–15.7)
IMM GRANULOCYTES # BLD: 0 10E9/L (ref 0–0.4)
IMM GRANULOCYTES NFR BLD: 0.2 %
INR PPP: 2.76 (ref 0.86–1.14)
LYMPHOCYTES # BLD AUTO: 1.3 10E9/L (ref 0.8–5.3)
LYMPHOCYTES NFR BLD AUTO: 22.4 %
MCH RBC QN AUTO: 29.2 PG (ref 26.5–33)
MCHC RBC AUTO-ENTMCNC: 30.6 G/DL (ref 31.5–36.5)
MCV RBC AUTO: 95 FL (ref 78–100)
MONOCYTES # BLD AUTO: 0.3 10E9/L (ref 0–1.3)
MONOCYTES NFR BLD AUTO: 6 %
NEUTROPHILS # BLD AUTO: 3.8 10E9/L (ref 1.6–8.3)
NEUTROPHILS NFR BLD AUTO: 67 %
NRBC # BLD AUTO: 0 10*3/UL
NRBC BLD AUTO-RTO: 0 /100
PLATELET # BLD AUTO: 242 10E9/L (ref 150–450)
POTASSIUM SERPL-SCNC: 3.7 MMOL/L (ref 3.4–5.3)
RBC # BLD AUTO: 3.49 10E12/L (ref 3.8–5.2)
SODIUM SERPL-SCNC: 142 MMOL/L (ref 133–144)
WBC # BLD AUTO: 5.7 10E9/L (ref 4–11)

## 2019-10-14 PROCEDURE — 85610 PROTHROMBIN TIME: CPT | Performed by: PHYSICIAN ASSISTANT

## 2019-10-14 PROCEDURE — 25000132 ZZH RX MED GY IP 250 OP 250 PS 637: Performed by: PHYSICIAN ASSISTANT

## 2019-10-14 PROCEDURE — 85025 COMPLETE CBC W/AUTO DIFF WBC: CPT | Performed by: PHYSICIAN ASSISTANT

## 2019-10-14 PROCEDURE — 80048 BASIC METABOLIC PNL TOTAL CA: CPT | Performed by: PHYSICIAN ASSISTANT

## 2019-10-14 PROCEDURE — 36415 COLL VENOUS BLD VENIPUNCTURE: CPT | Performed by: PHYSICIAN ASSISTANT

## 2019-10-14 PROCEDURE — 97530 THERAPEUTIC ACTIVITIES: CPT | Mod: GP

## 2019-10-14 PROCEDURE — 97110 THERAPEUTIC EXERCISES: CPT | Mod: GO

## 2019-10-14 RX ORDER — FUROSEMIDE 80 MG
80 TABLET ORAL DAILY
Qty: 30 TABLET | Refills: 0 | Status: SHIPPED | OUTPATIENT
Start: 2019-10-14 | End: 2019-11-20

## 2019-10-14 RX ORDER — POTASSIUM CHLORIDE 750 MG/1
20 TABLET, EXTENDED RELEASE ORAL DAILY
Status: DISCONTINUED | OUTPATIENT
Start: 2019-10-14 | End: 2019-10-14 | Stop reason: HOSPADM

## 2019-10-14 RX ORDER — WARFARIN SODIUM 5 MG/1
TABLET ORAL
Qty: 50 TABLET | Refills: 0 | Status: SHIPPED | OUTPATIENT
Start: 2019-10-14 | End: 2019-11-20

## 2019-10-14 RX ORDER — POTASSIUM CHLORIDE 1500 MG/1
20 TABLET, EXTENDED RELEASE ORAL DAILY
Qty: 30 TABLET | Refills: 0 | Status: SHIPPED | OUTPATIENT
Start: 2019-10-14 | End: 2019-11-20

## 2019-10-14 RX ADMIN — POTASSIUM CHLORIDE 20 MEQ: 10 TABLET, EXTENDED RELEASE ORAL at 08:12

## 2019-10-14 RX ADMIN — RANITIDINE 150 MG: 150 TABLET ORAL at 08:06

## 2019-10-14 RX ADMIN — THIAMINE HCL TAB 100 MG 100 MG: 100 TAB at 08:06

## 2019-10-14 RX ADMIN — LISINOPRIL 5 MG: 5 TABLET ORAL at 08:05

## 2019-10-14 RX ADMIN — FOLIC ACID 4 MG: 1 TABLET ORAL at 08:05

## 2019-10-14 RX ADMIN — DULOXETINE HYDROCHLORIDE 60 MG: 60 CAPSULE, DELAYED RELEASE ORAL at 08:06

## 2019-10-14 RX ADMIN — MULTIPLE VITAMINS W/ MINERALS TAB 1 TABLET: TAB at 08:05

## 2019-10-14 RX ADMIN — ARIPIPRAZOLE 5 MG: 5 TABLET ORAL at 08:06

## 2019-10-14 RX ADMIN — FUROSEMIDE 80 MG: 40 TABLET ORAL at 08:06

## 2019-10-14 ASSESSMENT — MIFFLIN-ST. JEOR: SCORE: 2307.72

## 2019-10-14 NOTE — PLAN OF CARE
FOCUS/GOAL  Medical management    ASSESSMENT, INTERVENTIONS AND CONTINUING PLAN FOR GOAL:  [id number]: Patient is alert and oriented and able to make her needs known. She is modified independent in her room with a walker. Patient is continent of bowel and bladder and reported a bowel movement on evening shift. She has a good appetite and ate 100% of her dinner. Patient's dressing changed to her left groin ECMO site. Wound bed with mostly slough and some granulation tissue. Dressing changed per order. Patient unsure who will be assessing her wound ongoing, although her mother has been educated and trained on how to change the dressing. Writer left note for care team and emailed [facility]. Wound supplies packed for patient as well. Assisted with placing Interdry in abdominal folds, as she did not place them after her shower. She denied any difficulty with pain or discomfort throughout evening shift. Plan is for patient to discharge home tomorrow, 10/14.     [id number]:  Patient has been sleeping well during overnight shift, repositioning independently in bed. No concerns noted. Patient remains modified independent in her room.

## 2019-10-14 NOTE — DISCHARGE INSTRUCTIONS
Follow-up with Sharon in the [facility] at [address] in [place] for your L groin wound assessments. If you do not hear from her within 48 hours, please call to arrange, at [phone].     Next INR check due 10/16/2019. You have an appointment at the  [place] lab that day at 10:30 for an INR check. [facility] [phone] will follow your Coumadin dosing and INR orders.    -They will call you with dosing orders and next INR check due date. If you do not hear from them within 24 hours of INR check, please call them [phone].

## 2019-10-14 NOTE — PROGRESS NOTES
Physical Therapy Discharge Summary    Reason for therapy discharge:    Discharged to home with outpatient therapy.    Progress towards therapy goal(s). See goals on Care Plan in [facility] electronic health record for goal details.  Goals met    Therapy recommendation(s):    Continued therapy is recommended.  Rationale/Recommendations:  Pt is now MOD I in room with WW and is now safe to d/c to home. She amb up to 200 ft without resting, which is adequate to access her apartment. Still has significant endurance deficits and gait deficit below baseline. Recommending ongoing OP PT after d/c to maximize physical performance and safety. .     Improved timed stand from 1:03 on 10/9 to 2:37 on 10/14.

## 2019-10-14 NOTE — PLAN OF CARE
Patient discharge from the ARU today, discharge instructions provided to her. Patient will pickup her meds at an outside pharmacy. Patient mom will do dressing changes on her L groin wound at home, will also follow up wit OP WOCN.

## 2019-10-14 NOTE — PROGRESS NOTES
Met with patient to update the discharge planning discussion. She will discharge to home today, nephew to transport. Her mother has learned groin wound care and will be able to manage at home. She will order ongoing supplies from [employer]. Left VM message for [facility] nurse Sharon to please see patient within a week, also included her contact info in discharge instructions for patient to follow-up if she does not hear from her. Next INR due 10/16, will have this checked at  [facility] lab at 10:30. Updated [facility] on patient status and discharge plan, with next INR due date. Sleep Medicine Consult is in [facility]. She will follow-up with Cardiology on 10/18 and 12/2, PCP Dr. Busby on 10/21, Rheumatology on 10/23. She removed her Ziopatch already and will mail it in the  she received.

## 2019-10-15 ENCOUNTER — ANTICOAGULATION THERAPY VISIT (OUTPATIENT)
Dept: ANTICOAGULATION | Facility: CLINIC | Age: 59
End: 2019-10-15

## 2019-10-15 ENCOUNTER — TELEPHONE (OUTPATIENT)
Dept: WOUND CARE | Facility: CLINIC | Age: 59
End: 2019-10-15

## 2019-10-15 DIAGNOSIS — I26.99 PULMONARY EMBOLISM, UNSPECIFIED CHRONICITY, UNSPECIFIED PULMONARY EMBOLISM TYPE, UNSPECIFIED WHETHER ACUTE COR PULMONALE PRESENT (H): ICD-10-CM

## 2019-10-15 NOTE — TELEPHONE ENCOUNTER
Pt was discharged yesterday. She has a wound and her mother is changing the dressing. She would lik to follow up with me for wound check.. Appt made        ----- Message from Margot Dye RN sent at 10/15/2019  6:52 AM CDT -----  [facility]  left groin wound. Contact pt reg   [phone]

## 2019-10-15 NOTE — PROGRESS NOTES
Dates of hospitalization: 10/7 to 10/14  Reason for hospitalization: Acute respiratory failure  Procedures performed:  Intubated.  Developed PEA arrest and found to have a massive PE.  Echo, ECMO and thrombectomy.  Vitamin K or FFP administered? no  INR Goal Range Confirmed to be:2-3  Inpatient warfarin doses added to calendar? yes  Medication changes at discharge: Started Tylenol, Abilify, K-dur /Klor-con, Zantac.  Dose change on lipitor, cymbalta, folvite, lasix, leucovorin, lisinopril, methotrexate, multivit and thiamine.  Discontinued medrol dose deana and protonix.   Warfarin dosing after DC: 7.5m on MWF and 5mg ROW.   Patient discharged on Lovenox? no  Next INR date: 10/16  Where is the patient discharging to? (home, TCU, staying locally, etc.): home  Will patient have home care? no

## 2019-10-16 DIAGNOSIS — I26.99 PULMONARY EMBOLISM, UNSPECIFIED CHRONICITY, UNSPECIFIED PULMONARY EMBOLISM TYPE, UNSPECIFIED WHETHER ACUTE COR PULMONALE PRESENT (H): ICD-10-CM

## 2019-10-16 LAB — INR PPP: 2.38 (ref 0.86–1.14)

## 2019-10-16 PROCEDURE — 36415 COLL VENOUS BLD VENIPUNCTURE: CPT | Performed by: FAMILY MEDICINE

## 2019-10-16 PROCEDURE — 85610 PROTHROMBIN TIME: CPT | Performed by: FAMILY MEDICINE

## 2019-10-17 ENCOUNTER — HOSPITAL ENCOUNTER (OUTPATIENT)
Dept: PHYSICAL THERAPY | Facility: CLINIC | Age: 59
Setting detail: THERAPIES SERIES
End: 2019-10-17
Attending: PHYSICAL MEDICINE & REHABILITATION
Payer: COMMERCIAL

## 2019-10-17 ENCOUNTER — ANTICOAGULATION THERAPY VISIT (OUTPATIENT)
Dept: ANTICOAGULATION | Facility: CLINIC | Age: 59
End: 2019-10-17

## 2019-10-17 DIAGNOSIS — I26.99 PULMONARY EMBOLISM, UNSPECIFIED CHRONICITY, UNSPECIFIED PULMONARY EMBOLISM TYPE, UNSPECIFIED WHETHER ACUTE COR PULMONALE PRESENT (H): ICD-10-CM

## 2019-10-17 DIAGNOSIS — I46.9 CARDIAC ARREST (H): ICD-10-CM

## 2019-10-17 PROCEDURE — 97162 PT EVAL MOD COMPLEX 30 MIN: CPT | Mod: GP | Performed by: PHYSICAL THERAPIST

## 2019-10-17 PROCEDURE — 97116 GAIT TRAINING THERAPY: CPT | Mod: GP | Performed by: PHYSICAL THERAPIST

## 2019-10-17 PROCEDURE — 97110 THERAPEUTIC EXERCISES: CPT | Mod: GP | Performed by: PHYSICAL THERAPIST

## 2019-10-17 RX ORDER — LISINOPRIL 10 MG/1
TABLET ORAL
Qty: 90 TABLET | Refills: 0 | OUTPATIENT
Start: 2019-10-17

## 2019-10-17 NOTE — PROGRESS NOTES
Cardiology Clinic Note  October 17, 2019      HPI:  Shira Rodriguez is a 59 year old female with a history of hypertension, hyperlipidemia, sleep apnea, morbid obesity, and chronic bronchitis who presents for hospital follow-up after PEA arrest secondary to massive pulmonary embolism. Patient was admitted to [facility] with acute dyspnea requiring intubation. CT + for massive PE. Transferred to [facility] for [facility] ECMO, and IR thrombectomy and catheter directed lytics on 9/9, decannulaled on 9/17 complicated by hypercarbic respiratory failure requiring placement of VV ECMO through 9/19. Off sedation and extubated 9/22. Repeat CT with no residual clot in PA on 09/21, but necrotic LLL, infection vs atelectasis.  Her estimated dry weight is unclear and she is a difficult exam but did receive copious fluid resuscitation during her ICU stay, now diuresed approximately 30 lbs with gradual improvement in her oxygenation. Appears euvolemic today, now on PO diuretics. Serial CXRs showed pulmonary edema, albeit improving as compared to previous exams, as well as a persistent L>R small pleural effusion. She will continue to need oral diuresis and pulmonary rehab with incentive spirometry.         Past medical history:  Past Medical History:   Diagnosis Date     Chronic bronchitis (H) 07/30/2015     Contact dermatitis      Depressive disorder 1985     Eczema     HANDS     Elevated liver enzymes      H/O total knee replacement, left      History of total hip replacement 10/27/2011     Hyperlipidemia      Hypertension      Morbid obesity (H)      Osteoarthrosis     right knee     Sleep apnea      Tobacco use disorder          Medications:  acetaminophen (TYLENOL) 325 MG tablet, Take 2 tablets (650 mg) by mouth every 6 hours as needed for mild pain or fever  ARIPiprazole (ABILIFY) 5 MG tablet, Take 1 tablet (5 mg) by mouth daily  atorvastatin (LIPITOR) 40 MG tablet, Take 1 tablet (40 mg) by mouth every evening  cyclobenzaprine  "(FLEXERIL) 10 MG tablet, Take 1 tablet (10 mg) by mouth nightly as needed for muscle spasms  diphenhydrAMINE-zinc acetate (BENADRYL) 2-0.1 % external cream, Apply topically 3 times daily as needed for itching  DULoxetine (CYMBALTA) 60 MG capsule, Take 1 capsule (60 mg) by mouth daily  folic acid (FOLVITE) 1 MG tablet, Take 4 tablets (4 mg) by mouth daily Take 3-4 tablets daily  furosemide (LASIX) 80 MG tablet, Take 1 tablet (80 mg) by mouth daily  insulin syringe-needle U-100 (BD INSULIN SYRINGE ULTRAFINE) 30G X 1/2\" 1 ML, For methotrexate use weekly  leucovorin (WELLCOVORIN) 5 MG tablet, Take 1 tablet (5 mg) by mouth every 7 days *take 24h after taking weekly methotrexate dose  lisinopril (PRINIVIL/ZESTRIL) 5 MG tablet, Take 1 tablet (5 mg) by mouth daily  methotrexate 50 MG/2ML injection CHEMO, Inject 1 mL (25 mg) Subcutaneous every 7 days  multivitamin w/minerals (THERA-VIT-M) tablet, 1 tablet by Oral or Feeding Tube route daily  nystatin (MYCOSTATIN) 597270 UNIT/GM external powder, Apply to coat rash tid, neck and breast area rashes  potassium chloride ER (K-DUR/KLOR-CON M) 20 MEQ CR tablet, Take 1 tablet (20 mEq) by mouth daily  ranitidine (ZANTAC) 150 MG tablet, Take 1 tablet (150 mg) by mouth 2 times daily  vitamin B1 (THIAMINE) 100 MG tablet, 1 tablet (100 mg) by Oral or Feeding Tube route daily  warfarin ANTICOAGULANT (COUMADIN) 5 MG tablet, Take 7.5 mg (1 and 1/2 tablets) by mouth on Monday, Wednesday and Friday Evenings.  Take 5 mg (1 tablet) by mouth all other days.    No current facility-administered medications on file prior to visit.       Allergies:      Allergies   Allergen Reactions     Adhesive Tape Blisters     Erythromycin Rash       Family and social history:    Family History   Problem Relation Age of Onset     Thyroid Disease Mother      Hypertension Mother      Skin Cancer Mother         MMohs surgery with skin graft     Cerebrovascular Disease Mother         CVA after endarderectomy     " Diabetes Mother      Breast Cancer Paternal Grandmother      Pancreatic Cancer Paternal Grandmother      Cardiovascular Paternal Aunt      Cardiovascular Paternal Uncle      Depression Other      Alcoholism Father      Thyroid Disease Sister      Alcoholism Sister      Hypertension Maternal Grandmother      Heart Disease Sister         multiple ablations     Alcoholism Sister      Prostate Cancer Paternal Grandfather        Social History     Socioeconomic History     Marital status: Single     Spouse name: Not on file     Number of children: Not on file     Years of education: Not on file     Highest education level: Not on file   Occupational History     Occupation: registered nurse     Comment: [employer]   Social Needs     Financial resource strain: Not on file     Food insecurity:     Worry: Not on file     Inability: Not on file     Transportation needs:     Medical: Not on file     Non-medical: Not on file   Tobacco Use     Smoking status: Current Every Day Smoker     Packs/day: 1.00     Years: 33.00     Pack years: 33.00     Types: Cigarettes     Start date: 1/1/1982     Smokeless tobacco: Never Used     Tobacco comment: maybe   Substance and Sexual Activity     Alcohol use: Yes     Alcohol/week: 0.0 standard drinks     Comment: occassional     Drug use: No     Sexual activity: Never   Lifestyle     Physical activity:     Days per week: Not on file     Minutes per session: Not on file     Stress: Not on file   Relationships     Social connections:     Talks on phone: Not on file     Gets together: Not on file     Attends Jain service: Not on file     Active member of club or organization: Not on file     Attends meetings of clubs or organizations: Not on file     Relationship status: Not on file     Intimate partner violence:     Fear of current or ex partner: Not on file     Emotionally abused: Not on file     Physically abused: Not on file     Forced sexual activity: Not on file   Other Topics  Concern     Parent/sibling w/ CABG, MI or angioplasty before 65F 55M? Not Asked   Social History Narrative     Not on file         Review of Systems:  Skin: No skin rash or ulcers.  Eyes: No red eye.  Ears/Nose/Throat: No ear discharge, nasal congestion, sore throat or dysphagia.  Respiratory: No cough or hemoptysis.  Cardiovascular: See HPI.    Gastrointestinal: No abdominal pain, nausea, vomiting, hematemesis or melena.  Genitourinary: No increased frequency or urgency of urine. No dysuria or hematuria.  Musculoskeletal: No polyarthralgia or myalgias.  Neurologic: No headaches, seizure or focal weakness.  Psychiatric: No hallucinations.  Hematologic/Lymphatic/Immunologic: No bleeding tendency.  Endocrine: No heat or cold intolerance, abnormal facial hair or alopecia.    Vital signs:  There were no vitals taken for this visit.   Wt Readings from Last 2 Encounters:   10/14/19 (!) 169.2 kg (373 lb)   10/07/19 (!) 172.2 kg (379 lb 9.6 oz)       Physical Exam:  Gen: NAD.    HEENT: No conjunctival pallor or scleral icterus, MMM. Clear oropharynx.    Neck: No JVD. No thyroid enlargement or cervical adenopathy.    Chest: Clear to auscultation bilaterally.    CV: Normal first and second heart sounds. No murmurs or gallop appreciated.    Abdomen: Soft, non-tender, non-distended, BS+.  Ext: No edema. Warm and well perfused with normal capillary refill.    Skin: No skin rash or ulcers.  Neuro: alert, oriented and appropriately conversant.    Psych: Normal affect and speech.    Labs:  Last Basic Metabolic Panel:  Lab Results   Component Value Date     10/14/2019      Lab Results   Component Value Date    POTASSIUM 3.7 10/14/2019     Lab Results   Component Value Date    CHLORIDE 109 10/14/2019     Lab Results   Component Value Date    MARIA INES 8.8 10/14/2019     Lab Results   Component Value Date    CO2 26 10/14/2019     Lab Results   Component Value Date    BUN 16 10/14/2019     Lab Results   Component Value Date    CR 0.58  10/14/2019     Lab Results   Component Value Date    GLC 99 10/14/2019       Lab Results   Component Value Date    WBC 5.7 10/14/2019     Lab Results   Component Value Date    RBC 3.49 10/14/2019     Lab Results   Component Value Date    HGB 10.2 10/14/2019     Lab Results   Component Value Date    HCT 33.3 10/14/2019     Lab Results   Component Value Date    MCV 95 10/14/2019     Lab Results   Component Value Date    MCH 29.2 10/14/2019     Lab Results   Component Value Date    MCHC 30.6 10/14/2019     Lab Results   Component Value Date    RDW 16.3 10/14/2019     Lab Results   Component Value Date     10/14/2019       Lab Results   Component Value Date    INR 2.38 10/16/2019    INR 2.76 10/14/2019    INR 2.71 10/13/2019    INR 2.66 10/12/2019    INR 2.94 10/11/2019    INR 2.96 10/10/2019    INR 2.88 10/09/2019    INR 2.50 10/08/2019    INR 2.22 10/07/2019    INR 2.10 10/06/2019    INR 1.78 10/05/2019    INR 1.91 10/04/2019         Diagnostics:    Echo  Imaging with results:  Echo 9/22/2019:  Interpretation Summary  Technically difficult study.  Left ventricular function, chamber size, wall motion, and wall thickness are  normal.The EF is 55-60%.  Right ventricular function, chamber size, wall motion, and thickness are  normal.  No pericardial effusion is present.     Left Ventricle  Left ventricular function, chamber size, wall motion, and wall thickness are  normal.The EF is 55-60%.     Right Ventricle  Right ventricular function, chamber size, wall motion, and thickness are  normal.     Mitral Valve  Mild to moderate mitral annular calcification is present. Trace mitral  insufficiency is present.     Tricuspid Valve  Trace tricuspid insufficiency is present. The peak velocity of the tricuspid  regurgitant jet is not obtainable.      Vessels  The aorta root is normal. Unable to assess mean RA pressure given the patient  is on a ventilator.     Pericardium  No pericardial effusion is present.    Assessment  and Plan:  This is a 59 year old with        Hospital Course by Diagnosis:  # PEA arrest secondary to massive pulmonary embolism  # Acute Hypoxemic Respiratory Failure, improved  # Bilateral Plueral Effusions, small L>R  # Nectrotic Left Lower Lobe on prior imaging, infection versus atelectasis  # Volume Overload, likely 2/2 acute RV failure (resolved) and ECMO resuscitation   Patient admitted to OSH for SOB, cyanotic and clammy on arrival, intubated. CT + for massive PE. Transferred to [facility] for [facility] ECMO, and IR thrombectomy and catheter directed lytics on 9/9, decannuled on 9/17 complicated by hypercarbic respiratory failure requiring placement of VV ECMO through RIJ-FV bypass. S/p VV ECMO decannulation 9/19. Off sedation and extubated 9/22. Repeat CT with no residual clot in PA on 09/21, but necrotic LLL, infection vs atelectasis. Patient has remained afebrile without leukocytosis and clinically improving since 9/21 arguing against a necrotizing infection. We did do a diagnostic tap 10/4, likely transudate pleural effusion 2/2 acute PH with her acute massive PE, acute heart failure (gradually improving), and deconditioning due to prolonged ICU stay with hypoalbuminemia.INR theraputic as of 09/30/2019. She still intermittently requiring some oxygen, 2-4L with activity/sleep. This is likely mutli-factorial and due to her effusion, her obesity, and significant deconditioning.  Her estimated dry weight is unclear and she is a difficult exam but did receive copious fluid resuscitation during her ICU stay, now diuresed approximately 30 lbs with gradual improvement in her oxygenation. Appears euvolemic today, now on PO diuretics. Serial CXRs showed pulmonary edema, albeit improving as compared to previous exams, as well as a persistent L>R small pleural effusion. She will continue to need oral diuresis and pulmonary rehab with incentive spirometry.   - Lasix 80 mg PO daily   - IS per nursing   - Coumadin with goal INR  "2-3. INR 2.22 today. Would recommend lifelong.   - Duonebs PRN; Acapella qid   - Wean oxygen as able  - Patient previously diagnosed with CYRUS, having difficulty wearing CPAP (see below), should follow up with PCP in 1 week, possibly readdress need to see Sleep Specialist     #Pannus wound (right side and right groin)  Patient with dehisced left groin cannula wound, staples removed 10/1 with dehiscence shortly after. WOC following, appreciate their recs:     Daily    1. Cleanse with gentle soap and water  2. Apply Xeroform (#[id number]) to open wound only  3. Cover with ABD if needed for drainage       L) groin- Remove old dressing very gently.    Cleanse with Microklenz and pat dry    Cut a piece of polymem (#[id number]) to fit into the wound bed and place it. Ensure to cover entire wound bed.    Cover with 4x4 gauze or ABD depending upon drainage.    Change dressing every day.       Abdominal pannus only around intact skin-Cleanse the area and dry thoroughly daily.    Cut a piece of interdry (#[id number]) and place it as a single layer and date it.     Ensure to leave at least 2\" of tail beyond the fold to promote moisture wicking.    May leave same interdry for 5 days if not soiled.  DO not use this product with any other creams or powder.     # Sinus Pause  Noted overnight 09/29 while sleeping; length of 3.6 seconds.  Discussed with EP; no need for acute intervention. Will stop BB and plan to discharge the patient on monitor. Per patient, with previous sleep study she was also noted to have a significant pause while on CPAP. She has a CPAP at home but tolerates it only for an hour or two, she also notes that she has tried different face masks.   - Ziopatch x14 days at discharge  - EP consult; appreciate formal recommendations  - Follow up with sleep specialist after discharge to home as listed above     # Afib/Aflutter  Noted to be in atrial fibrillation while in the ICU and on dobutamine, which was thought to be " exacerbating factor. Dobutamine discontinued 9/23 and patient was given amiodarone bolus at that time. She converted to sinus. On 9/25 she went into atrial flutter with variable block. Again was given an amio bolus, fluid bolus, and IV metop x1. Continues to intermittently go into a-fib - rates now controlled. Has been in sinus rhythm last several days.  Seen by EP 09/30 given sinus pause; see recommendations as above  - holding BB due to sinus pause  - warfarin as above     # Acute Thrombocytopenia (resolved)   Thrombocytopenia 60-80k baseline 180k. Had precipitous drop in platelets after admission - suspected due to consumption. 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 Has stabilized.      #COPD  ##History of tobacco abuse  ###Aspiration pneumonia, resolved  Sputum cultures:  - 9/11 MSSA: Completed vancomycin/zosyn x5 days for ECMO, vanc last dose 9/15  - 9/20 Serratia (ceftriaxone susceptible): cefazolin 9/17-9/22, Ceftriaxone 9/23-9/28  - s/p prednisone burst     # HTN   BP stable, 110-120/60-70's  - Lasix 80 mg daily as listed above  - Continue 5mg lisinopril, can be titrated as OP  - discontinue PTA spironolactone     # History of depression  Cymbalta held while in ICU. Resumed at lower dose over next few days and increased to PTA dose. Additionally reports that she may have been on Wellbutrin, however, notes regarding this drug state she had increased crying episodes while on this medication and it appears to have been stopped.  - up titrated to home 60mg Cymbalta     # HLD  - Atorvastatin 40mg     # CYRUS   Patient diagnosed with sleep apnea, but refuses to use BiPAP/CPAP at home due to mask discomfort.  - Oxygen at night PRN to keep O2 sats >88%.      # Rheumatoid Arthritis  - Resume PTA methotrexate/Wellcovorin       Follow-up: RTC in *** year.        Answers for HPI/ROS submitted by the patient on 10/12/2019   General Symptoms: Yes  Skin Symptoms: No  HENT Symptoms: Yes  EYE SYMPTOMS: No  HEART  SYMPTOMS: Yes  LUNG SYMPTOMS: Yes  INTESTINAL SYMPTOMS: No  URINARY SYMPTOMS: No  GYNECOLOGIC SYMPTOMS: No  BREAST SYMPTOMS: No  SKELETAL SYMPTOMS: Yes  BLOOD SYMPTOMS: Yes  NERVOUS SYSTEM SYMPTOMS: Yes  MENTAL HEALTH SYMPTOMS: No  Fever: No  Loss of appetite: No  Weight loss: Yes  Weight gain: No  Fatigue: Yes  Night sweats: No  Chills: No  Increased stress: Yes  Excessive hunger: No  Excessive thirst: No  Feeling hot or cold when others believe the temperature is normal: No  Loss of height: No  Post-operative complications: No  Surgical site pain: No  Hallucinations: No  Change in or Loss of Energy: Yes  Hyperactivity: No  Confusion: No  Ear pain: No  Ear discharge: No  Hearing loss: No  Tinnitus: No  Nosebleeds: No  Congestion: Yes  Sinus pain: No  Trouble swallowing: No   Voice hoarseness: Yes  Mouth sores: No  Sore throat: No  Tooth pain: No  Gum tenderness: No  Bleeding gums: No  Change in taste: No  Change in sense of smell: No  Dry mouth: No  Hearing aid used: No  Neck lump: No  Cough: Yes  Sputum or phlegm: Yes  Coughing up blood: No  Difficulty breating or shortness of breath: Yes  Snoring: No  Wheezing: No  Difficulty breathing on exertion: Yes  Nighttime Cough: No  Difficulty breathing when lying flat: Yes  Chest pain or pressure: No  Fast or irregular heartbeat: Yes  Pain in legs with walking: No  Trouble breathing while lying down: Yes  Fingers or toes appear blue: No  Low blood pressure: Yes  Fainting: No  Murmurs: No  Pacemaker: No  Varicose veins: No  Edema or swelling: Yes  Wake up at night with shortness of breath: No  Light-headedness: No  Exercise intolerance: Yes  Back pain: Yes  Muscle aches: No  Neck pain: No  Swollen joints: No  Joint pain: Yes  Bone pain: No  Muscle cramps: No  Muscle weakness: Yes  Joint stiffness: Yes  Bone fracture: No  Anemia: No  Swollen glands: No  Easy bleeding or bruising: Yes  Trouble with coordination: No  Dizziness or trouble with balance: No  Fainting or  black-out spells: No  Memory loss: No  Headache: No  Seizures: No  Speech problems: No  Tingling: No  Tremor: No  Weakness: Yes  Difficulty walking: Yes  Paralysis: No  Numbness: No

## 2019-10-17 NOTE — PROGRESS NOTES
ADDENDUM from 10/17:  Pt phones later in the day to review the dosing plan below.  She verbalizes understanding.  Husam CORRAL        ANTICOAGULATION FOLLOW-UP CLINIC VISIT    Patient Name:  Shira Rodrgiuez  Date:  10/17/2019  Contact Type:  Telephone    SUBJECTIVE:         OBJECTIVE    INR   Date Value Ref Range Status   10/16/2019 2.38 (H) 0.86 - 1.14 Final       ASSESSMENT / PLAN  INR assessment THER    Recheck INR In: 5 DAYS    INR Location Clinic      Anticoagulation Summary  As of 10/17/2019    INR goal:   2.0-3.0   TTR:   --   INR used for dosin.38 (10/16/2019)   Warfarin maintenance plan:   7.5 mg (5 mg x 1.5) every Mon, Wed, Fri; 5 mg (5 mg x 1) all other days   Full warfarin instructions:   7.5 mg every Mon, Wed, Fri; 5 mg all other days   Weekly warfarin total:   42.5 mg   Plan last modified:   Fauzia Gonzales RN (10/17/2019)   Next INR check:   10/22/2019   Target end date:       Indications    Pulmonary emboli (H) [I26.99]             Anticoagulation Episode Summary     INR check location:       Preferred lab:       Send INR reminders to:   [facility]    Comments:   HIPPA Forms mailed 10/11/19      Anticoagulation Care Providers     Provider Role Specialty Phone number    Qi Anjel FAYE MD [facility] [phone]            See the Encounter Report to view Anticoagulation Flowsheet and Dosing Calendar (Go to Encounters tab in chart review, and find the Anticoagulation Therapy Visit)  Left message for patient with results and dosing recommendations. Asked patient to call back to report any missed doses, falls, signs and symptoms of bleeding or clotting, any changes in health, medication, or diet. Asked patient to call back with any questions or concerns.      Fauzia Gonzales RN

## 2019-10-18 ENCOUNTER — OFFICE VISIT (OUTPATIENT)
Dept: WOUND CARE | Facility: CLINIC | Age: 59
End: 2019-10-18
Payer: COMMERCIAL

## 2019-10-18 ENCOUNTER — OFFICE VISIT (OUTPATIENT)
Dept: CARDIOLOGY | Facility: CLINIC | Age: 59
End: 2019-10-18
Attending: NURSE PRACTITIONER
Payer: COMMERCIAL

## 2019-10-18 VITALS
SYSTOLIC BLOOD PRESSURE: 121 MMHG | DIASTOLIC BLOOD PRESSURE: 78 MMHG | WEIGHT: 293 LBS | HEART RATE: 97 BPM | HEIGHT: 66 IN | OXYGEN SATURATION: 94 % | BODY MASS INDEX: 47.09 KG/M2

## 2019-10-18 DIAGNOSIS — G47.33 OSA (OBSTRUCTIVE SLEEP APNEA): ICD-10-CM

## 2019-10-18 DIAGNOSIS — I46.9 CARDIAC ARREST (H): Primary | ICD-10-CM

## 2019-10-18 DIAGNOSIS — S31.109A WOUND OF LEFT GROIN: Primary | ICD-10-CM

## 2019-10-18 DIAGNOSIS — I10 BENIGN ESSENTIAL HYPERTENSION: ICD-10-CM

## 2019-10-18 DIAGNOSIS — I26.99 ACUTE MASSIVE PULMONARY EMBOLISM (H): ICD-10-CM

## 2019-10-18 DIAGNOSIS — I48.0 PAROXYSMAL ATRIAL FIBRILLATION (H): ICD-10-CM

## 2019-10-18 DIAGNOSIS — I50.810 RVF (RIGHT VENTRICULAR FAILURE) (H): ICD-10-CM

## 2019-10-18 LAB — INTERPRETATION ECG - MUSE: NORMAL

## 2019-10-18 PROCEDURE — 93005 ELECTROCARDIOGRAM TRACING: CPT | Mod: ZF

## 2019-10-18 PROCEDURE — 93010 ELECTROCARDIOGRAM REPORT: CPT | Mod: ZP | Performed by: INTERNAL MEDICINE

## 2019-10-18 PROCEDURE — 99215 OFFICE O/P EST HI 40 MIN: CPT | Mod: ZP | Performed by: NURSE PRACTITIONER

## 2019-10-18 PROCEDURE — G0463 HOSPITAL OUTPT CLINIC VISIT: HCPCS | Mod: 25,ZF

## 2019-10-18 ASSESSMENT — PAIN SCALES - GENERAL: PAINLEVEL: NO PAIN (0)

## 2019-10-18 ASSESSMENT — MIFFLIN-ST. JEOR: SCORE: 2324.95

## 2019-10-18 NOTE — PATIENT INSTRUCTIONS
You were seen today in the Cardiovascular Clinic at the [facility].    Cardiology provider you saw during your visit Alondra Corbett NP.    1. Your are doing remarkably well, continue working with physical therapy.  2. Continue current medications.  3. Start weighing yourself at home and call us if you develop weight gain > 2 lbs in a day or 5 lbs in a week.  4. See your PCP on Monday for hospital f/up and labs.   5. See Dr. Mcfarlane in December as scheduled.    Questions and [phone].   First press #1 for the Banjo and then press #3 for Medical Questions to reach the Cardiology triage nurse.     On Call Cardiologist for after hours or on weekends: [phone], press option #4 and ask to speak to the on-call Cardiologist.

## 2019-10-18 NOTE — LETTER
10/18/2019      RE: Shira Rodriguez  [address]  [place] [address]       Dear Colleague,    Thank you for the opportunity to participate in the care of your patient, Shira Rodriguez, at the [facility] at [facility]. Please see a copy of my visit note below.    Cardiology Clinic Note  October 18, 2019      HPI:  Shira Rodriguez is a 59 year old female with a history of hypertension, hyperlipidemia, morbid obesity, CYRUS, diastolic dysfunction, rheumatoid arthritis, former tobacco use and chronic bronchitis who presents for hospital follow-up after a PEA arrest secondary to massive pulmonary embolism.     The patient was admitted to [facility] on 9/9 with acute respiratory failure requiring intubation and subsequently developed a PEA arrest, was found to have a massive PE on chest CT and echo with RV failure.  She was transferred to [facility] where she was placed on [facility] ECMO, underwent mechanical thrombectomy and catheter directed lytics in IR. She was decannulaled on 9/17, though developed respiratory failure secondary to aspiration pneumonia and volume overload requiring placement of VV ECMO through 9/19. She was aggressively diuresed and treated with IV antibiotics, eventually extubated on 9/22. Her hospital course was complicated by bilateral pleural effusions s/p left thoracentesis, paroxysmal a-fib/flutter and left groin cannula wound. She was also noted to have a 3.6 second sinus pause on 9/29 while asleep. She was evaluated by EP who thought it may be related to apnea, recommended use of CPAP, holding beta blockers and placing a ZioPatch on discharge. She was transitioned from heparin to warfarin and was discharged to acute rehab from 10/7-10/14.    Since hospital discharge the patient reports feeling relatively well, all things considered. She started physical therapy yesterday which went well. She is using a walker to ambulate short  distances and a wheelchair when leaving the house. She continues to have quite a bit of chest wall pain from the CPR, but denies exertional chest pain or pressure. She reports shortness of breath with minimal activity but recovers quickly, in less than a minute or so. She has a hoarse voice which she attributes to her prolonged intubation. She denies orthopnea, PND or significant leg swelling. She has not been weighing herself at home. Her weight on hospital discharge was 379 lbs, she is currently 381 lbs today. She denies any palpitations, lightheadedness or syncope. She has been following with the wound care nurse for her left groin cannula wound and her mother is changing her dressings at home. Per patient things seem to be improving. She has not smoked cigarettes since her arrest and plans to continue with smoking cessation. She has not been wearing CPAP due to mask discomfort, she plans to make an appointment in the near future. She has been compliant with her warfarin and her INR has been at goal. She denies significant bleeding issues.     Past medical history:  Past Medical History:   Diagnosis Date     Chronic bronchitis (H) 07/30/2015     Contact dermatitis      Depressive disorder 1985     Eczema     HANDS     Elevated liver enzymes      H/O total knee replacement, left      History of total hip replacement 10/27/2011     Hyperlipidemia      Hypertension      Morbid obesity (H)      Osteoarthrosis     right knee     Sleep apnea      Tobacco use disorder      Medications:  acetaminophen (TYLENOL) 325 MG tablet, Take 2 tablets (650 mg) by mouth every 6 hours as needed for mild pain or fever  ARIPiprazole (ABILIFY) 5 MG tablet, Take 1 tablet (5 mg) by mouth daily  atorvastatin (LIPITOR) 40 MG tablet, Take 1 tablet (40 mg) by mouth every evening  cyclobenzaprine (FLEXERIL) 10 MG tablet, Take 1 tablet (10 mg) by mouth nightly as needed for muscle spasms  diphenhydrAMINE-zinc acetate (BENADRYL) 2-0.1 % external  "cream, Apply topically 3 times daily as needed for itching  DULoxetine (CYMBALTA) 60 MG capsule, Take 1 capsule (60 mg) by mouth daily  folic acid (FOLVITE) 1 MG tablet, Take 4 tablets (4 mg) by mouth daily Take 3-4 tablets daily  furosemide (LASIX) 80 MG tablet, Take 1 tablet (80 mg) by mouth daily  insulin syringe-needle U-100 (BD INSULIN SYRINGE ULTRAFINE) 30G X 1/2\" 1 ML, For methotrexate use weekly  leucovorin (WELLCOVORIN) 5 MG tablet, Take 1 tablet (5 mg) by mouth every 7 days *take 24h after taking weekly methotrexate dose  lisinopril (PRINIVIL/ZESTRIL) 5 MG tablet, Take 1 tablet (5 mg) by mouth daily  methotrexate 50 MG/2ML injection CHEMO, Inject 1 mL (25 mg) Subcutaneous every 7 days  multivitamin w/minerals (THERA-VIT-M) tablet, 1 tablet by Oral or Feeding Tube route daily  nystatin (MYCOSTATIN) 146314 UNIT/GM external powder, Apply to coat rash tid, neck and breast area rashes  potassium chloride ER (K-DUR/KLOR-CON M) 20 MEQ CR tablet, Take 1 tablet (20 mEq) by mouth daily  ranitidine (ZANTAC) 150 MG tablet, Take 1 tablet (150 mg) by mouth 2 times daily  vitamin B1 (THIAMINE) 100 MG tablet, 1 tablet (100 mg) by Oral or Feeding Tube route daily  warfarin ANTICOAGULANT (COUMADIN) 5 MG tablet, Take 7.5 mg (1 and 1/2 tablets) by mouth on Monday, Wednesday and Friday Evenings.  Take 5 mg (1 tablet) by mouth all other days.    No current facility-administered medications on file prior to visit.     Allergies:      Allergies   Allergen Reactions     Adhesive Tape Blisters     Erythromycin Rash       Family and social history:    Family History   Problem Relation Age of Onset     Thyroid Disease Mother      Hypertension Mother      Skin Cancer Mother         MMohs surgery with skin graft     Cerebrovascular Disease Mother         CVA after endarderectomy     Diabetes Mother      Breast Cancer Paternal Grandmother      Pancreatic Cancer Paternal Grandmother      Cardiovascular Paternal Aunt      Cardiovascular " Paternal Uncle      Depression Other      Alcoholism Father      Thyroid Disease Sister      Alcoholism Sister      Hypertension Maternal Grandmother      Heart Disease Sister         multiple ablations     Alcoholism Sister      Prostate Cancer Paternal Grandfather        Social History     Socioeconomic History     Marital status: Single     Spouse name: Not on file     Number of children: Not on file     Years of education: Not on file     Highest education level: Not on file   Occupational History     Occupation: registered nurse     Comment: [employer]   Social Needs     Financial resource strain: Not on file     Food insecurity:     Worry: Not on file     Inability: Not on file     Transportation needs:     Medical: Not on file     Non-medical: Not on file   Tobacco Use     Smoking status: Current Every Day Smoker     Packs/day: 1.00     Years: 33.00     Pack years: 33.00     Types: Cigarettes     Start date: 1/1/1982     Smokeless tobacco: Never Used     Tobacco comment: maybe   Substance and Sexual Activity     Alcohol use: Yes     Alcohol/week: 0.0 standard drinks     Comment: occassional     Drug use: No     Sexual activity: Never   Lifestyle     Physical activity:     Days per week: Not on file     Minutes per session: Not on file     Stress: Not on file   Relationships     Social connections:     Talks on phone: Not on file     Gets together: Not on file     Attends Anglican service: Not on file     Active member of club or organization: Not on file     Attends meetings of clubs or organizations: Not on file     Relationship status: Not on file     Intimate partner violence:     Fear of current or ex partner: Not on file     Emotionally abused: Not on file     Physically abused: Not on file     Forced sexual activity: Not on file   Other Topics Concern     Parent/sibling w/ CABG, MI or angioplasty before 65F 55M? Not Asked   Social History Narrative     Not on file     Review of Systems:  Skin: No  "skin rash or ulcers.  Eyes: No red eye.  Ears/Nose/Throat: No ear discharge, nasal congestion, sore throat or dysphagia.  Respiratory: No cough or hemoptysis.  Cardiovascular: See HPI.    Gastrointestinal: No abdominal pain, nausea, vomiting, hematemesis or melena.  Genitourinary: No increased frequency or urgency of urine. No dysuria or hematuria.  Musculoskeletal: No polyarthralgia or myalgias.  Neurologic: No headaches, seizure or focal weakness.  Psychiatric: No hallucinations.  Hematologic/Lymphatic/Immunologic: No bleeding tendency.  Endocrine: No heat or cold intolerance, abnormal facial hair or alopecia.    Vital signs:  /78 (BP Location: Right arm, Patient Position: Chair, Cuff Size: Adult Large)   Pulse 97   Ht 1.676 m (5' 6\")   Wt (!) 172.8 kg (381 lb)   SpO2 94%   BMI 61.50 kg/m      Wt Readings from Last 2 Encounters:   10/14/19 (!) 169.2 kg (373 lb)   10/07/19 (!) 172.2 kg (379 lb 9.6 oz)     Physical Exam:  Gen: NAD.    HEENT: No conjunctival pallor or scleral icterus, MMM. Clear oropharynx.    Neck: No JVD. No thyroid enlargement or cervical adenopathy.    Chest: Clear to auscultation bilaterally.    CV: Normal first and second heart sounds. No murmurs or gallop appreciated.    Abdomen: Soft, non-tender, non-distended, BS+.  Ext: trace edema. Warm and well perfused with normal capillary refill.    Skin: No skin rash or ulcers.  Neuro: alert, oriented and appropriately conversant.    Psych: Normal affect and speech.    Labs:  Last Basic Metabolic Panel:  Lab Results   Component Value Date     10/14/2019      Lab Results   Component Value Date    POTASSIUM 3.7 10/14/2019     Lab Results   Component Value Date    CHLORIDE 109 10/14/2019     Lab Results   Component Value Date    MARIA INES 8.8 10/14/2019     Lab Results   Component Value Date    CO2 26 10/14/2019     Lab Results   Component Value Date    BUN 16 10/14/2019     Lab Results   Component Value Date    CR 0.58 10/14/2019     Lab " Results   Component Value Date    GLC 99 10/14/2019       Lab Results   Component Value Date    WBC 5.7 10/14/2019     Lab Results   Component Value Date    RBC 3.49 10/14/2019     Lab Results   Component Value Date    HGB 10.2 10/14/2019     Lab Results   Component Value Date    HCT 33.3 10/14/2019     Lab Results   Component Value Date    MCV 95 10/14/2019     Lab Results   Component Value Date    MCH 29.2 10/14/2019     Lab Results   Component Value Date    MCHC 30.6 10/14/2019     Lab Results   Component Value Date    RDW 16.3 10/14/2019     Lab Results   Component Value Date     10/14/2019       Lab Results   Component Value Date    INR 2.38 10/16/2019    INR 2.76 10/14/2019    INR 2.71 10/13/2019    INR 2.66 10/12/2019    INR 2.94 10/11/2019    INR 2.96 10/10/2019    INR 2.88 10/09/2019    INR 2.50 10/08/2019    INR 2.22 10/07/2019    INR 2.10 10/06/2019    INR 1.78 10/05/2019    INR 1.91 10/04/2019     Diagnostics:    Imaging with results:  Echo 9/22/2019:  Interpretation Summary  Technically difficult study.  Left ventricular function, chamber size, wall motion, and wall thickness are  normal.The EF is 55-60%.  Right ventricular function, chamber size, wall motion, and thickness are  normal.  No pericardial effusion is present.     Left Ventricle  Left ventricular function, chamber size, wall motion, and wall thickness are  normal.The EF is 55-60%.     Right Ventricle  Right ventricular function, chamber size, wall motion, and thickness are  normal.     Mitral Valve  Mild to moderate mitral annular calcification is present. Trace mitral  insufficiency is present.     Tricuspid Valve  Trace tricuspid insufficiency is present. The peak velocity of the tricuspid  regurgitant jet is not obtainable.      Vessels  The aorta root is normal. Unable to assess mean RA pressure given the patient  is on a ventilator.     Pericardium  No pericardial effusion is present.    Assessment and Plan:  Shira Rodriguez is  a 59 year old female with a history of hypertension, hyperlipidemia, CYRUS, morbid obesity, diastolic dysfunction, rheumatoid arthritis, former tobacco use and chronic bronchitis who presents for hospital follow-up after a PEA arrest secondary to massive pulmonary embolism s/p VA ECMO, mechanical thrombectomy and catheter directed lytics, with prolonged hospital course complicated by RV failure with recovery in function, volume overload, aspiration pneumonia, a-fib/flutter, sinus pause and left groin cannula wound.     1. PEA arrest secondary to massive PE:  Presented 9/9 with massive pulmonary embolism and RV failure with subsequent PEA arrest, was placed on VA ECMO 9/9-9/17, and underwent mechanical thrombectomy and had catheter directed lytics. Follow-up CT with no residual clot. Follow-up echo 9/22 showed recovery in RV function. Etiology of her PE is unclear, no clear provoking factors other than obesity and sedentary lifestyle. Plan to continue lifelong anticoagulation with warfarin. Today she is doing very well, participating in physical therapy with improving dyspnea, no heart failure symptoms.   - Continue warfarin lifelong with goal INR 2-3  - Appears euvolemic, continue lasix 80 mg daily, labs with PCP on Monday  - Continue physical therapy   - Patient is already established with a therapist    2. Sinus Pause:  Noted to have 3.6 second pause while asleep on 9/29. EP consulted, thought possibly related to untreated CYRUS. No acute intervention recommended, ZioPatch placed on discharge. She continues to feel well without recent syncope or lightheadedness.   - ZioPatch results pending  - If significant pauses will refer to EP     3. A-fib/Aflutter:  Noted to have paroxysmal a-fib/flutter during her hospitalization requiring IV amiodarone and metoprolol. Both were were discontinued due to a sinus pause and rates were well controlled. ECG today shows normal sinus rhythm.   - Continue to hold beta blocker pending  ZioPatch results  - [id number] is 3, warfarin as above    4. Left groin wound:   - Ongoing management per wound care, appt today    5. HTN, goal < 130/80: Well controlled.   - Continue 5mg lisinopril     6. HLD: Well controlled.  - Atorvastatin 40mg     7. CYRUS:  Patient diagnosed with sleep apnea, but does not use CPAP at home due to mask discomfort.   - Recommended outpatient sleep study to discuss alternative options    Follow-up: RTC in 2 months.      Please do not hesitate to contact me if you have any questions/concerns.     Sincerely,     Alondra Corbett NP

## 2019-10-18 NOTE — PROGRESS NOTES
10/17/19 1339   Quick Adds   Type of Visit Initial OP PT Evaluation   General Information   Start of Care Date 10/17/19   Referring Physician Carli Salas PA    Orders Evaluate and Treat as Indicated   Order Date 10/11/19   Medical Diagnosis Cardiac arrest (H) I46.9, Pulmonary embolism, unspecified chronicity, unspecified pulmonary embolism type, unspecified whether acute cor pulmonale present (H) I26.99    Onset of illness/injury or Date of Surgery 09/09/19  (date of hospital admission)   Precautions/Limitations fall precautions   Surgical/Medical history reviewed Yes   Pertinent history of current problem (include personal factors and/or comorbidities that impact the POC) Pt presents to PT to address deconditioned status after recent hospitalization 9/9/19 with acute respiratory failure was intubated developed PEA arrest, found to have massive pulmonary embolism, RV failure. Admitted to [facility] for ECMO and thrombectomy, course complicated by hypoxic respiratory failure, pleural effusions, pneumonia, thrombocytopenia, hypervolemia, and deconditioning. Admitted to acute rehab 10/7 for ongoing rehabilitation and medical management.   Hx of OA, has preexisting B knee pain.  Hospitalized 9/9-10/7 then at [facility] 10/7-10/14, discharged home from that point.  Hx of RA, B hand pain.   Prior level of function comment She reports she had been feeling generally fatigued and deconditioned prior to onset of this medical situation.  Started using her walker prior to this due to fatigue and back pain.  Otherwise indep PLOF prior to that.  Recently, has been making continued slow progress with discharge home, walking shorter functional distances.   Current Community Support   (lives alone, family nearby)   Patient role/Employment history Other/comments  (quit work prior to this medical event)   Living environment Apartment/condo   Home/Community Accessibility Comments no stairs in her apartment, elevator access,  moderate hallway distance ~100ft   Current Assistive Devices Front Wheeled Walker   ADL Devices Shower/Tub Chair;Sock Aide;Reacher   Assistive Devices Comments Using FWW since discharge from hospital/[facility]   Patient/Family Goals Statement Improve strength, stamina, and mobility status.   Fall Risk Screen   Fall screen completed by PT   Have you fallen 2 or more times in the past year? No   Have you fallen and had an injury in the past year? No   Timed Up and Go score (seconds) 17.89   Is patient a fall risk? Yes;Department fall risk interventions implemented   Fall screen comments Fall risk > 13.5'' on TUG   Pain   Patient currently in pain Yes   Pain location mild/moderate chronic B knee OA pain, chest wall pain from chest compressions   Vitals Signs   Vital Signs Comments HR tachy with activity up to 148bpm, 95 bpm at rest. SaO2 stable at rest and with activity > 93%.  Most recent INR 2.38 on 10/16/19.   Cognitive Status Examination   Orientation orientation to person, place and time   Integumentary   Integumentary Comments mild generalized LE swelling in legs, healing R neck scar from central line placement   Posture   Posture Forward head position   Range of Motion (ROM)   ROM Comment WFL all extremities, at least to 90 deg shoulder elevation and B hip/knee flexion, somewhat limited by soft tissue approximation.     Strength   Strength Comments Grossly intact and WFL, but functional deconditioning/weakness noted.  5/5 distally in LEs, 4-/5 to 3+/5 seated hip flexion strength.  Weakness noted with chair stand test, see below.   Bed Mobility   Bed Mobility Comments NT   Transfer Skills   Transfer Comments Mild use of hands to rise, independent with FWW support.  Fair/poor descent control due to weakness and preexisiting R knee OA pain.   Gait   Gait Comments Amb with FWW support, reciprocal gait pattern, mild antalgic pattern due to R knee OA pain and weakness, mild forward flexed posture with FWW support. Limited  amb distances < 200ft currently.   Gait Special Tests   Gait Special Tests 25 FOOT TIMED WALK   Gait Special Tests 25 Foot Timed Walk   Seconds 11.39   Comments w/ FWW: 0.67 m/sec   Balance   Balance Comments Fair static standing balance, rhomberg EO/EC x 30'', needing light touch support for short distance mobility and gait skills, stabilizes well with FWW currently.   Balance Special Tests   Balance Special Tests Romberg;Sit to stand reps;Timed up and go   Balance Special Tests Timed Up and Go   Seconds 17.89 Seconds   Comments FWW   Balance Special Tests Romberg   Seconds 30 Seconds   Comments 30'' EO/EC, WNL testing   Balance Special Tests Sit to Stand Reps in 30 Seconds   Reps in 30 seconds 6   Height 18   Comments plinth surface, difficulty fitting in chair with arm rests due to size.  Limited by fatigue and some R knee OA pain.   Modality Interventions   Planned Modality Interventions Comments as indicated per therapist discretion   Planned Therapy Interventions   Planned Therapy Interventions balance training;bed mobility training;gait training;neuromuscular re-education;ROM;strengthening;stretching;transfer training;manual therapy   Clinical Impression   Criteria for Skilled Therapeutic Interventions Met yes, treatment indicated   PT Diagnosis Deconditioned status with impaired functional mobility   Influenced by the following impairments Deconditioning/weakness, impaired balance, decreased ROM, OA and chest wall pain, blood thinners/monitoring INR closely, abnormal HR response to activity   Functional limitations due to impairments Impaired functional activity tolerance, gait activity, transfers, decreased gait speed, fall risk, limited independence with ADLs, limited community ambulation, reliance on AD   Clinical Presentation Evolving/Changing   Clinical Presentation Rationale complex recent PMH, unstable/monitoring INR and HR response to activity, multiple body systems involved.   Clinical Decision  Making (Complexity) Moderate complexity   Therapy Frequency 2 times/Week  (decreasing frequency as appropriate)   Predicted Duration of Therapy Intervention (days/wks) 90 days   Risk & Benefits of therapy have been explained Yes   Patient, Family & other staff in agreement with plan of care Yes   Education Assessment   Barriers to Learning No barriers   GOALS   PT Eval Goals 1;2;3;4;5   Goal 1   Goal Identifier TUG   Goal Description Pt to demo TUG in <10'' to show improving functional mobility status and reduced fall risk (baseline 17.89'' with FWW).   Target Date 01/15/20   Goal 2   Goal Identifier Gait Speed   Goal Description Pt will demo gait speed of >1.0 m/sec to show improving functional community speeds and reduced fall risk (baseline 0.67 m/sec with FWW).   Target Date 01/15/20   Goal 3   Goal Identifier 30'' Chair Stand   Goal Description Pt to demo 12 or more reps chair stand from 18'' surface in 30'' to show improving strength and and functional mobility status (baseline 6 reps).   Target Date 01/15/20   Goal 4   Goal Identifier Balance   Goal Description Pt to demo ability to safely reach to floor to  items without UE support, no LOB, for improved stability with ADLs and functional mobility.   Target Date 01/15/20   Goal 5   Goal Identifier HEP   Goal Description Pt will demo (I) with HEP for ongoing management/improvement in condition and optimal QOL.   Target Date 01/15/20   Total Evaluation Time   PT Narendra Moderate Complexity Minutes ([id number]) 45

## 2019-10-18 NOTE — NURSING NOTE
Vitals completed successfully and medication reconciled. EKG done.   Georgette Snyder CMA  11:14 AM  Chief Complaint   Patient presents with     Follow Up      Hospital follow up on 9/9/19 for Cardiac arrest.

## 2019-10-18 NOTE — NURSING NOTE
Vitals completed successfully and medication reconciled. EKG done.   Georgette Snyder CMA  11:17 AM  Chief Complaint   Patient presents with     Follow Up      Hospital follow up on 9/9/19 for Cardiac arrest.

## 2019-10-18 NOTE — PROGRESS NOTES
Cardiology Clinic Note  October 18, 2019      HPI:  Shira Rodriguez is a 59 year old female with a history of hypertension, hyperlipidemia, morbid obesity, CYRUS, diastolic dysfunction, rheumatoid arthritis, former tobacco use and chronic bronchitis who presents for hospital follow-up after a PEA arrest secondary to massive pulmonary embolism.     The patient was admitted to [facility] on 9/9 with acute respiratory failure requiring intubation and subsequently developed a PEA arrest, was found to have a massive PE on chest CT and echo with RV failure. She was transferred to [facility] where she was placed on VA ECMO, underwent mechanical thrombectomy and catheter directed lytics in . She was decannulaled on 9/17, though developed respiratory failure secondary to aspiration pneumonia and volume overload requiring placement of VV ECMO through 9/19. She was aggressively diuresed and treated with IV antibiotics, eventually extubated on 9/22. Her hospital course was complicated by bilateral pleural effusions s/p left thoracentesis, paroxysmal a-fib/flutter and left groin cannula wound. She was also noted to have a 3.6 second sinus pause on 9/29 while asleep. She was evaluated by EP who thought it may be related to apnea, recommended use of CPAP, holding beta blockers and placing a ZioPatch on discharge. She was transitioned from heparin to warfarin and was discharged to acute rehab from 10/7-10/14.    Since hospital discharge the patient reports feeling relatively well, all things considered. She started physical therapy yesterday which went well. She is using a walker to ambulate short distances and a wheelchair when leaving the house. She continues to have quite a bit of chest wall pain from the CPR, but denies exertional chest pain or pressure. She reports shortness of breath with minimal activity but recovers quickly, in less than a minute or so. She has a hoarse voice which she attributes to her prolonged  intubation. She denies orthopnea, PND or significant leg swelling. She has not been weighing herself at home. Her weight on hospital discharge was 379 lbs, she is currently 381 lbs today. She denies any palpitations, lightheadedness or syncope. She has been following with the wound care nurse for her left groin cannula wound and her mother is changing her dressings at home. Per patient things seem to be improving. She has not smoked cigarettes since her arrest and plans to continue with smoking cessation. She has not been wearing CPAP due to mask discomfort, she plans to make an appointment in the near future. She has been compliant with her warfarin and her INR has been at goal. She denies significant bleeding issues.     Past medical history:  Past Medical History:   Diagnosis Date     Chronic bronchitis (H) 07/30/2015     Contact dermatitis      Depressive disorder 1985     Eczema     HANDS     Elevated liver enzymes      H/O total knee replacement, left      History of total hip replacement 10/27/2011     Hyperlipidemia      Hypertension      Morbid obesity (H)      Osteoarthrosis     right knee     Sleep apnea      Tobacco use disorder      Medications:  acetaminophen (TYLENOL) 325 MG tablet, Take 2 tablets (650 mg) by mouth every 6 hours as needed for mild pain or fever  ARIPiprazole (ABILIFY) 5 MG tablet, Take 1 tablet (5 mg) by mouth daily  atorvastatin (LIPITOR) 40 MG tablet, Take 1 tablet (40 mg) by mouth every evening  cyclobenzaprine (FLEXERIL) 10 MG tablet, Take 1 tablet (10 mg) by mouth nightly as needed for muscle spasms  diphenhydrAMINE-zinc acetate (BENADRYL) 2-0.1 % external cream, Apply topically 3 times daily as needed for itching  DULoxetine (CYMBALTA) 60 MG capsule, Take 1 capsule (60 mg) by mouth daily  folic acid (FOLVITE) 1 MG tablet, Take 4 tablets (4 mg) by mouth daily Take 3-4 tablets daily  furosemide (LASIX) 80 MG tablet, Take 1 tablet (80 mg) by mouth daily  insulin syringe-needle  "U-100 (BD INSULIN SYRINGE ULTRAFINE) 30G X 1/2\" 1 ML, For methotrexate use weekly  leucovorin (WELLCOVORIN) 5 MG tablet, Take 1 tablet (5 mg) by mouth every 7 days *take 24h after taking weekly methotrexate dose  lisinopril (PRINIVIL/ZESTRIL) 5 MG tablet, Take 1 tablet (5 mg) by mouth daily  methotrexate 50 MG/2ML injection CHEMO, Inject 1 mL (25 mg) Subcutaneous every 7 days  multivitamin w/minerals (THERA-VIT-M) tablet, 1 tablet by Oral or Feeding Tube route daily  nystatin (MYCOSTATIN) 770038 UNIT/GM external powder, Apply to coat rash tid, neck and breast area rashes  potassium chloride ER (K-DUR/KLOR-CON M) 20 MEQ CR tablet, Take 1 tablet (20 mEq) by mouth daily  ranitidine (ZANTAC) 150 MG tablet, Take 1 tablet (150 mg) by mouth 2 times daily  vitamin B1 (THIAMINE) 100 MG tablet, 1 tablet (100 mg) by Oral or Feeding Tube route daily  warfarin ANTICOAGULANT (COUMADIN) 5 MG tablet, Take 7.5 mg (1 and 1/2 tablets) by mouth on Monday, Wednesday and Friday Evenings.  Take 5 mg (1 tablet) by mouth all other days.    No current facility-administered medications on file prior to visit.     Allergies:      Allergies   Allergen Reactions     Adhesive Tape Blisters     Erythromycin Rash       Family and social history:    Family History   Problem Relation Age of Onset     Thyroid Disease Mother      Hypertension Mother      Skin Cancer Mother         MMohs surgery with skin graft     Cerebrovascular Disease Mother         CVA after endarderectomy     Diabetes Mother      Breast Cancer Paternal Grandmother      Pancreatic Cancer Paternal Grandmother      Cardiovascular Paternal Aunt      Cardiovascular Paternal Uncle      Depression Other      Alcoholism Father      Thyroid Disease Sister      Alcoholism Sister      Hypertension Maternal Grandmother      Heart Disease Sister         multiple ablations     Alcoholism Sister      Prostate Cancer Paternal Grandfather        Social History     Socioeconomic History     Marital " status: Single     Spouse name: Not on file     Number of children: Not on file     Years of education: Not on file     Highest education level: Not on file   Occupational History     Occupation: registered nurse     Comment: [employer]   Social Needs     Financial resource strain: Not on file     Food insecurity:     Worry: Not on file     Inability: Not on file     Transportation needs:     Medical: Not on file     Non-medical: Not on file   Tobacco Use     Smoking status: Current Every Day Smoker     Packs/day: 1.00     Years: 33.00     Pack years: 33.00     Types: Cigarettes     Start date: 1/1/1982     Smokeless tobacco: Never Used     Tobacco comment: maybe   Substance and Sexual Activity     Alcohol use: Yes     Alcohol/week: 0.0 standard drinks     Comment: occassional     Drug use: No     Sexual activity: Never   Lifestyle     Physical activity:     Days per week: Not on file     Minutes per session: Not on file     Stress: Not on file   Relationships     Social connections:     Talks on phone: Not on file     Gets together: Not on file     Attends Protestant service: Not on file     Active member of club or organization: Not on file     Attends meetings of clubs or organizations: Not on file     Relationship status: Not on file     Intimate partner violence:     Fear of current or ex partner: Not on file     Emotionally abused: Not on file     Physically abused: Not on file     Forced sexual activity: Not on file   Other Topics Concern     Parent/sibling w/ CABG, MI or angioplasty before 65F 55M? Not Asked   Social History Narrative     Not on file     Review of Systems:  Skin: No skin rash or ulcers.  Eyes: No red eye.  Ears/Nose/Throat: No ear discharge, nasal congestion, sore throat or dysphagia.  Respiratory: No cough or hemoptysis.  Cardiovascular: See HPI.    Gastrointestinal: No abdominal pain, nausea, vomiting, hematemesis or melena.  Genitourinary: No increased frequency or urgency of urine. No  "dysuria or hematuria.  Musculoskeletal: No polyarthralgia or myalgias.  Neurologic: No headaches, seizure or focal weakness.  Psychiatric: No hallucinations.  Hematologic/Lymphatic/Immunologic: No bleeding tendency.  Endocrine: No heat or cold intolerance, abnormal facial hair or alopecia.    Vital signs:  /78 (BP Location: Right arm, Patient Position: Chair, Cuff Size: Adult Large)   Pulse 97   Ht 1.676 m (5' 6\")   Wt (!) 172.8 kg (381 lb)   SpO2 94%   BMI 61.50 kg/m     Wt Readings from Last 2 Encounters:   10/14/19 (!) 169.2 kg (373 lb)   10/07/19 (!) 172.2 kg (379 lb 9.6 oz)     Physical Exam:  Gen: NAD.    HEENT: No conjunctival pallor or scleral icterus, MMM. Clear oropharynx.    Neck: No JVD. No thyroid enlargement or cervical adenopathy.    Chest: Clear to auscultation bilaterally.    CV: Normal first and second heart sounds. No murmurs or gallop appreciated.    Abdomen: Soft, non-tender, non-distended, BS+.  Ext: trace edema. Warm and well perfused with normal capillary refill.    Skin: No skin rash or ulcers.  Neuro: alert, oriented and appropriately conversant.    Psych: Normal affect and speech.    Labs:  Last Basic Metabolic Panel:  Lab Results   Component Value Date     10/14/2019      Lab Results   Component Value Date    POTASSIUM 3.7 10/14/2019     Lab Results   Component Value Date    CHLORIDE 109 10/14/2019     Lab Results   Component Value Date    MARIA INES 8.8 10/14/2019     Lab Results   Component Value Date    CO2 26 10/14/2019     Lab Results   Component Value Date    BUN 16 10/14/2019     Lab Results   Component Value Date    CR 0.58 10/14/2019     Lab Results   Component Value Date    GLC 99 10/14/2019       Lab Results   Component Value Date    WBC 5.7 10/14/2019     Lab Results   Component Value Date    RBC 3.49 10/14/2019     Lab Results   Component Value Date    HGB 10.2 10/14/2019     Lab Results   Component Value Date    HCT 33.3 10/14/2019     Lab Results   Component Value " Date    MCV 95 10/14/2019     Lab Results   Component Value Date    MCH 29.2 10/14/2019     Lab Results   Component Value Date    MCHC 30.6 10/14/2019     Lab Results   Component Value Date    RDW 16.3 10/14/2019     Lab Results   Component Value Date     10/14/2019       Lab Results   Component Value Date    INR 2.38 10/16/2019    INR 2.76 10/14/2019    INR 2.71 10/13/2019    INR 2.66 10/12/2019    INR 2.94 10/11/2019    INR 2.96 10/10/2019    INR 2.88 10/09/2019    INR 2.50 10/08/2019    INR 2.22 10/07/2019    INR 2.10 10/06/2019    INR 1.78 10/05/2019    INR 1.91 10/04/2019     Diagnostics:    Imaging with results:  Echo 9/22/2019:  Interpretation Summary  Technically difficult study.  Left ventricular function, chamber size, wall motion, and wall thickness are  normal.The EF is 55-60%.  Right ventricular function, chamber size, wall motion, and thickness are  normal.  No pericardial effusion is present.     Left Ventricle  Left ventricular function, chamber size, wall motion, and wall thickness are  normal.The EF is 55-60%.     Right Ventricle  Right ventricular function, chamber size, wall motion, and thickness are  normal.     Mitral Valve  Mild to moderate mitral annular calcification is present. Trace mitral  insufficiency is present.     Tricuspid Valve  Trace tricuspid insufficiency is present. The peak velocity of the tricuspid  regurgitant jet is not obtainable.      Vessels  The aorta root is normal. Unable to assess mean RA pressure given the patient  is on a ventilator.     Pericardium  No pericardial effusion is present.    Assessment and Plan:  Shira Rodriguez is a 59 year old female with a history of hypertension, hyperlipidemia, CYRUS, morbid obesity, diastolic dysfunction, rheumatoid arthritis, former tobacco use and chronic bronchitis who presents for hospital follow-up after a PEA arrest secondary to massive pulmonary embolism s/p VA ECMO, mechanical thrombectomy and catheter directed  lytics, with prolonged hospital course complicated by RV failure with recovery in function, volume overload, aspiration pneumonia, a-fib/flutter, sinus pause and left groin cannula wound.     1. PEA arrest secondary to massive PE:  Presented 9/9 with massive pulmonary embolism and RV failure with subsequent PEA arrest, was placed on VA ECMO 9/9-9/17, and underwent mechanical thrombectomy and had catheter directed lytics. Follow-up CT with no residual clot. Follow-up echo 9/22 showed recovery in RV function. Etiology of her PE is unclear, no clear provoking factors other than obesity and sedentary lifestyle. Plan to continue lifelong anticoagulation with warfarin. Today she is doing very well, participating in physical therapy with improving dyspnea, no heart failure symptoms.   - Continue warfarin lifelong with goal INR 2-3  - Appears euvolemic, continue lasix 80 mg daily, labs with PCP on Monday  - Continue physical therapy   - Patient is already established with a therapist    2. Sinus Pause:  Noted to have 3.6 second pause while asleep on 9/29. EP consulted, thought possibly related to untreated CYRUS. No acute intervention recommended, ZioPatch placed on discharge. She continues to feel well without recent syncope or lightheadedness.   - ZioPatch results pending  - If significant pauses will refer to EP     3. A-fib/Aflutter:  Noted to have paroxysmal a-fib/flutter during her hospitalization requiring IV amiodarone and metoprolol. Both were were discontinued due to a sinus pause and rates were well controlled. ECG today shows normal sinus rhythm.   - Continue to hold beta blocker pending ZioPatch results  - [id number] is 3, warfarin as above    4. Left groin wound:   - Ongoing management per wound care, appt today    5. HTN, goal < 130/80: Well controlled.   - Continue 5mg lisinopril     6. HLD: Well controlled.  - Atorvastatin 40mg     7. CYRUS:  Patient diagnosed with sleep apnea, but does not use CPAP at home  due to mask discomfort.   - Recommended outpatient sleep study to discuss alternative options    Follow-up: RTC in 2 months.    Answers for HPI/ROS submitted by the patient on 10/12/2019   General Symptoms: Yes  Skin Symptoms: No  HENT Symptoms: Yes  EYE SYMPTOMS: No  HEART SYMPTOMS: Yes  LUNG SYMPTOMS: Yes  INTESTINAL SYMPTOMS: No  URINARY SYMPTOMS: No  GYNECOLOGIC SYMPTOMS: No  BREAST SYMPTOMS: No  SKELETAL SYMPTOMS: Yes  BLOOD SYMPTOMS: Yes  NERVOUS SYSTEM SYMPTOMS: Yes  MENTAL HEALTH SYMPTOMS: No  Fever: No  Loss of appetite: No  Weight loss: Yes  Weight gain: No  Fatigue: Yes  Night sweats: No  Chills: No  Increased stress: Yes  Excessive hunger: No  Excessive thirst: No  Feeling hot or cold when others believe the temperature is normal: No  Loss of height: No  Post-operative complications: No  Surgical site pain: No  Hallucinations: No  Change in or Loss of Energy: Yes  Hyperactivity: No  Confusion: No  Ear pain: No  Ear discharge: No  Hearing loss: No  Tinnitus: No  Nosebleeds: No  Congestion: Yes  Sinus pain: No  Trouble swallowing: No   Voice hoarseness: Yes  Mouth sores: No  Sore throat: No  Tooth pain: No  Gum tenderness: No  Bleeding gums: No  Change in taste: No  Change in sense of smell: No  Dry mouth: No  Hearing aid used: No  Neck lump: No  Cough: Yes  Sputum or phlegm: Yes  Coughing up blood: No  Difficulty breating or shortness of breath: Yes  Snoring: No  Wheezing: No  Difficulty breathing on exertion: Yes  Nighttime Cough: No  Difficulty breathing when lying flat: Yes  Chest pain or pressure: No  Fast or irregular heartbeat: Yes  Pain in legs with walking: No  Trouble breathing while lying down: Yes  Fingers or toes appear blue: No  Low blood pressure: Yes  Fainting: No  Murmurs: No  Pacemaker: No  Varicose veins: No  Edema or swelling: Yes  Wake up at night with shortness of breath: No  Light-headedness: No  Exercise intolerance: Yes  Back pain: Yes  Muscle aches: No  Neck pain: No  Swollen  joints: No  Joint pain: Yes  Bone pain: No  Muscle cramps: No  Muscle weakness: Yes  Joint stiffness: Yes  Bone fracture: No  Anemia: No  Swollen glands: No  Easy bleeding or bruising: Yes  Trouble with coordination: No  Dizziness or trouble with balance: No  Fainting or black-out spells: No  Memory loss: No  Headache: No  Seizures: No  Speech problems: No  Tingling: No  Tremor: No  Weakness: Yes  Difficulty walking: Yes  Paralysis: No  Numbness: No

## 2019-10-21 ENCOUNTER — OFFICE VISIT (OUTPATIENT)
Dept: FAMILY MEDICINE | Facility: CLINIC | Age: 59
End: 2019-10-21
Payer: COMMERCIAL

## 2019-10-21 VITALS
SYSTOLIC BLOOD PRESSURE: 114 MMHG | BODY MASS INDEX: 45.99 KG/M2 | WEIGHT: 293 LBS | DIASTOLIC BLOOD PRESSURE: 76 MMHG | OXYGEN SATURATION: 96 % | HEART RATE: 95 BPM | HEIGHT: 67 IN

## 2019-10-21 DIAGNOSIS — Z00.00 HEALTH CARE MAINTENANCE: Primary | ICD-10-CM

## 2019-10-21 DIAGNOSIS — Z09 HOSPITAL DISCHARGE FOLLOW-UP: ICD-10-CM

## 2019-10-21 DIAGNOSIS — E87.6 HYPOKALEMIA: ICD-10-CM

## 2019-10-21 DIAGNOSIS — G47.33 OSA (OBSTRUCTIVE SLEEP APNEA): ICD-10-CM

## 2019-10-21 DIAGNOSIS — Z86.711 HISTORY OF PULMONARY EMBOLISM: ICD-10-CM

## 2019-10-21 ASSESSMENT — MIFFLIN-ST. JEOR: SCORE: 2344.08

## 2019-10-21 ASSESSMENT — PAIN SCALES - GENERAL: PAINLEVEL: NO PAIN (0)

## 2019-10-21 NOTE — NURSING NOTE
Chief Complaint   Patient presents with     Hospital F/U     patient is here for a hospital follow up        Nahomy Helms LPN, EMT at 10:50 AM on 10/21/2019.

## 2019-10-21 NOTE — PROGRESS NOTES
[facility]  Primary Care Center   Anjel Busby MD  10/21/2019      Chief Complaint:   Hospital F/U       History of Present Illness:   Shira Rodriguez is a 59 year old female with a history of pulmonary embolism, rheumatoid arthritis,  who presents for a hospital follow-up. Initially the patient presented to the ED with a pulmonary embolism. Patient was transferred to [facility] and admitted to the hospital with a pulmonary embolism, respiratory failure, PEA arrest, and atrial fibrillation on 9/9/2019. She was put on ECMO and underwent thrombectomy with catheter directed thrombolytics at [facility]. She was less mobile after quitting her job to pursue PT care because she has back pain. She was doing pool PT daily, other than that she was immobile. After leaving work she was smoking more but quit after her pulmonary embolism. She had a Holter monitor but did not tolerate the adhesive so she only wore it for 5 days. She was started on Lasix in the hospital.    She does not remember most of her hospital stay, she recalls calling 911 for shortness of breath and the ambulance arriving. When they tried to intubate her she coded. Her chest still hurts from chest compressions. She had one week of rehab and is home now. She continues to work on endurance and walks with a walker in her apartment. Her mother is helping her change dressings the groin wound where the ECMO was placed. The wound is slowly healing.     Other topics discussed:  1.Also noted to have a sinus pause while sleeping, which was previously found during a sleep study while on CPAP. She wants to see the sleep clinic. Tried to get a dental implant.  2. She has a hoarse voice but her throat is not painful  3. She continues to have a productive cough  4. Potassium was falling during her hospital stay, she is on a supplement    Healthcare maintenance topics:  Mammogram (females age 40 - 75): yearly or every 2 years? - Recommended  Pap (females age 21 - 65): every 3  years, every 5 years after age 35 if cotesting done - due 2021  Colon Cancer screening (age 50 - 75): yearly FIT or colonoscopy every 5 - 10 years - Recommended, Cologuard at home  Glucose: every 5 years, yearly if risk factors? - Up to Date   Lipid panel: every 5 years, yearly if risk factors - Up to Date   Hepatitis C (adults born 1944 - 1965): once per lifetime - negative 2016  HIV (ages 13 - 64): once per lifetime, yearly if MSM or other risk factors - negative 2017  Immunizations (Tetanus every 10 years, Influenza yearly, Pneumonia PPV-23 and PCV-13 age ? 65 or sooner if risk factors) - Up to Date   Immunization History   Administered Date(s) Administered     Influenza (IIV3) PF 10/04/2011, 10/03/2012, 09/24/2016, 10/16/2017     Influenza Quad, Recombinant, p-free (RIV4) 09/29/2019     Influenza Vaccine IM > 6 months Valent IIV4 10/03/2018     Pneumo Conj 13-V (2010&after) 08/28/2017, 05/23/2019     Pneumococcal 23 valent 12/04/2017     Tdap (Adacel,Boostrix) 07/16/2010     Zoster vaccine recombinant adjuvanted (SHINGRIX) 06/04/2018, 05/23/2019        The following health maintenance issues were also reviewed and addressed as needed:  Blood pressure (>18 years annually)    Review of Systems:   Pertinent items are noted in HPI, remainder of complete ROS is negative.      Active Medications:     Current Outpatient Medications:      acetaminophen (TYLENOL) 325 MG tablet, Take 2 tablets (650 mg) by mouth every 6 hours as needed for mild pain or fever, Disp: , Rfl:      ARIPiprazole (ABILIFY) 5 MG tablet, Take 1 tablet (5 mg) by mouth daily, Disp: 30 tablet, Rfl: 0     atorvastatin (LIPITOR) 40 MG tablet, Take 1 tablet (40 mg) by mouth every evening, Disp: 30 tablet, Rfl: 0     DULoxetine (CYMBALTA) 60 MG capsule, Take 1 capsule (60 mg) by mouth daily, Disp: 30 capsule, Rfl: 0     folic acid (FOLVITE) 1 MG tablet, Take 4 tablets (4 mg) by mouth daily Take 3-4 tablets daily, Disp: 120 tablet, Rfl: 0     furosemide  "(LASIX) 80 MG tablet, Take 1 tablet (80 mg) by mouth daily, Disp: 30 tablet, Rfl: 0     insulin syringe-needle U-100 (BD INSULIN SYRINGE ULTRAFINE) 30G X 1/2\" 1 ML, For methotrexate use weekly, Disp: 8 each, Rfl: prn     leucovorin (WELLCOVORIN) 5 MG tablet, Take 1 tablet (5 mg) by mouth every 7 days *take 24h after taking weekly methotrexate dose, Disp: 4 tablet, Rfl: 0     lisinopril (PRINIVIL/ZESTRIL) 5 MG tablet, Take 1 tablet (5 mg) by mouth daily, Disp: 30 tablet, Rfl: 0     methotrexate 50 MG/2ML injection CHEMO, Inject 1 mL (25 mg) Subcutaneous every 7 days, Disp: 4 mL, Rfl: 0     multivitamin w/minerals (THERA-VIT-M) tablet, 1 tablet by Oral or Feeding Tube route daily, Disp: 30 tablet, Rfl: 0     potassium chloride ER (K-DUR/KLOR-CON M) 20 MEQ CR tablet, Take 1 tablet (20 mEq) by mouth daily, Disp: 30 tablet, Rfl: 0     ranitidine (ZANTAC) 150 MG tablet, Take 1 tablet (150 mg) by mouth 2 times daily, Disp: 60 tablet, Rfl: 0     vitamin B1 (THIAMINE) 100 MG tablet, 1 tablet (100 mg) by Oral or Feeding Tube route daily, Disp: 30 tablet, Rfl: 0     warfarin ANTICOAGULANT (COUMADIN) 5 MG tablet, Take 7.5 mg (1 and 1/2 tablets) by mouth on Monday, Wednesday and Friday Evenings.  Take 5 mg (1 tablet) by mouth all other days., Disp: 50 tablet, Rfl: 0     cyclobenzaprine (FLEXERIL) 10 MG tablet, Take 1 tablet (10 mg) by mouth nightly as needed for muscle spasms (Patient not taking: Reported on 10/18/2019), Disp: 30 tablet, Rfl: 1     diphenhydrAMINE-zinc acetate (BENADRYL) 2-0.1 % external cream, Apply topically 3 times daily as needed for itching (Patient not taking: Reported on 10/18/2019), Disp: , Rfl:      nystatin (MYCOSTATIN) 690951 UNIT/GM external powder, Apply to coat rash tid, neck and breast area rashes (Patient not taking: Reported on 10/18/2019), Disp: 60 g, Rfl: 3      Allergies:   Adhesive tape and Erythromycin      Past Medical History:  Chronic bronchitis  Contact dermatitis  Depression  Elevated " "liver enzymes  Hyperlipidemia  Hypertension  Osteoarthritis  Obstructive sleep apnea  Degenerative disk disease cervical spine  Pulmonary embolism  rheumatoid arthritis     Past Surgical History:  Knee arthroplasty  Hip arthroplasty  Flexible bronchoscopy  Hip arthroplasty   Extracorporal membrane oxygenation  Thrombectomy   Pulmonary angiogram    Family History:   Mother: thyroid disease, hypertension, skin cancer, cerebrovascular disease, diabetes mellitus  Paternal grandmother: breast cancer, pancreatic cancer  Paternal aunt: cardiovascular  Paternal uncle: cardiovascular  Other: depression  Father: alcoholism  Sister: thyroid disease, alcoholism, cardiac ablations  Maternal grandmother: hypertension  Paternal grandfather: prostate cancer     Social History:   Unmarried  Former PPD smoker for 33 years, quit September 2019    Physical Exam:   /76 (BP Location: Right arm, Patient Position: Sitting, Cuff Size: Adult Regular)   Pulse 95   Ht 1.707 m (5' 7.21\")   Wt (!) 172.8 kg (381 lb)   SpO2 96%   BMI 59.31 kg/m     Constitutional: Alert. In no distress. Sitting in wheelchair  Head: The scalp, face, and head appear normal.  Musculoskeletal: Extremities appear normal. No gross deformities noted.   Neurologic: Gait normal. Speech is normal and fluent.  Psychiatric: Mentation appears normal. Normal affect.      Assessment and Plan:  Health care maintenance  - Mammogram, routine screening    CYRUS (obstructive sleep apnea)  Unable to tolerate CPAP.   - SLEEP EVALUATION & MANAGEMENT REFERRAL - ADULT -Other (Respond in commments) (ump)    History of pulmonary embolism  If possible we would like to have hematology figure out why she had a pulmonary embolism and if she need to be anticoagulated lifelong and if she needs to be on Coumadin or can switch.   - Oncology/Hematology Adult Referral    Hypokalemia  - Basic metabolic panel     Follow-up: Return in about 1 month (around 11/21/2019).         Scribe " Disclosure:  I, Shalom Martino, am serving as a scribe to document services personally performed by Anjel Busby MD at this visit, based upon the provider's statements to me. All documentation has been reviewed by the aforementioned provider prior to being entered into the official medical record.    Portions of this medical record were completed by a scribe. UPON MY REVIEW AND AUTHENTICATION BY ELECTRONIC SIGNATURE, this confirms (a) I performed the applicable clinical services, and (b) the record is accurate.   Anjel Busby MD MD

## 2019-10-21 NOTE — PATIENT INSTRUCTIONS
[facility] Medication Refill Request Information:  * Please contact your pharmacy regarding ANY request for medication refills.  ** [facility] Prescription Fax = [phone]  * Please allow 3 business days for routine medication refills.  * Please allow 5 business days for controlled substance medication refills.     [facility] Test Result notification information:  *You will be notified with in 7-10 days of your appointment day regarding the results of your test.  If you are on MyChart you will be notified as soon as the provider has reviewed the results and signed off on them.    [facility]: [phone]

## 2019-10-22 DIAGNOSIS — I26.99 PULMONARY EMBOLISM, UNSPECIFIED CHRONICITY, UNSPECIFIED PULMONARY EMBOLISM TYPE, UNSPECIFIED WHETHER ACUTE COR PULMONALE PRESENT (H): ICD-10-CM

## 2019-10-22 DIAGNOSIS — M05.741 RHEUMATOID ARTHRITIS INVOLVING BOTH HANDS WITH POSITIVE RHEUMATOID FACTOR (H): ICD-10-CM

## 2019-10-22 DIAGNOSIS — M05.742 RHEUMATOID ARTHRITIS INVOLVING BOTH HANDS WITH POSITIVE RHEUMATOID FACTOR (H): ICD-10-CM

## 2019-10-22 LAB
ERYTHROCYTE [DISTWIDTH] IN BLOOD BY AUTOMATED COUNT: 17.1 % (ref 10–15)
HCT VFR BLD AUTO: 36.3 % (ref 35–47)
HGB BLD-MCNC: 11.6 G/DL (ref 11.7–15.7)
INR PPP: 2.18 (ref 0.86–1.14)
MCH RBC QN AUTO: 30.1 PG (ref 26.5–33)
MCHC RBC AUTO-ENTMCNC: 32 G/DL (ref 31.5–36.5)
MCV RBC AUTO: 94 FL (ref 78–100)
PLATELET # BLD AUTO: 298 10E9/L (ref 150–450)
RBC # BLD AUTO: 3.85 10E12/L (ref 3.8–5.2)
WBC # BLD AUTO: 7 10E9/L (ref 4–11)

## 2019-10-22 PROCEDURE — 85610 PROTHROMBIN TIME: CPT | Performed by: INTERNAL MEDICINE

## 2019-10-22 PROCEDURE — 36415 COLL VENOUS BLD VENIPUNCTURE: CPT | Performed by: INTERNAL MEDICINE

## 2019-10-22 PROCEDURE — 85027 COMPLETE CBC AUTOMATED: CPT | Performed by: INTERNAL MEDICINE

## 2019-10-22 PROCEDURE — 82565 ASSAY OF CREATININE: CPT | Performed by: INTERNAL MEDICINE

## 2019-10-22 PROCEDURE — 84460 ALANINE AMINO (ALT) (SGPT): CPT | Performed by: INTERNAL MEDICINE

## 2019-10-22 PROCEDURE — 84450 TRANSFERASE (AST) (SGOT): CPT | Performed by: INTERNAL MEDICINE

## 2019-10-23 ENCOUNTER — ANTICOAGULATION THERAPY VISIT (OUTPATIENT)
Dept: ANTICOAGULATION | Facility: CLINIC | Age: 59
End: 2019-10-23

## 2019-10-23 ENCOUNTER — OFFICE VISIT (OUTPATIENT)
Dept: RHEUMATOLOGY | Facility: CLINIC | Age: 59
End: 2019-10-23
Attending: INTERNAL MEDICINE
Payer: COMMERCIAL

## 2019-10-23 ENCOUNTER — HOSPITAL ENCOUNTER (OUTPATIENT)
Dept: MAMMOGRAPHY | Facility: CLINIC | Age: 59
Discharge: HOME | End: 2019-10-23
Attending: FAMILY MEDICINE | Admitting: FAMILY MEDICINE
Payer: COMMERCIAL

## 2019-10-23 ENCOUNTER — OFFICE VISIT (OUTPATIENT)
Dept: SLEEP MEDICINE | Facility: CLINIC | Age: 59
End: 2019-10-23
Payer: COMMERCIAL

## 2019-10-23 VITALS
BODY MASS INDEX: 45.99 KG/M2 | HEART RATE: 100 BPM | SYSTOLIC BLOOD PRESSURE: 108 MMHG | OXYGEN SATURATION: 94 % | TEMPERATURE: 97.5 F | RESPIRATION RATE: 24 BRPM | DIASTOLIC BLOOD PRESSURE: 69 MMHG | WEIGHT: 293 LBS | HEIGHT: 67 IN

## 2019-10-23 VITALS
BODY MASS INDEX: 45.99 KG/M2 | DIASTOLIC BLOOD PRESSURE: 78 MMHG | RESPIRATION RATE: 20 BRPM | OXYGEN SATURATION: 96 % | SYSTOLIC BLOOD PRESSURE: 122 MMHG | HEART RATE: 98 BPM | WEIGHT: 293 LBS | HEIGHT: 67 IN

## 2019-10-23 DIAGNOSIS — M05.742 RHEUMATOID ARTHRITIS INVOLVING BOTH HANDS WITH POSITIVE RHEUMATOID FACTOR (H): ICD-10-CM

## 2019-10-23 DIAGNOSIS — T45.1X5A ADVERSE EFFECT OF METHOTREXATE, INITIAL ENCOUNTER: ICD-10-CM

## 2019-10-23 DIAGNOSIS — Z79.899 HIGH RISK MEDICATION USE: ICD-10-CM

## 2019-10-23 DIAGNOSIS — I26.99 PULMONARY EMBOLISM, UNSPECIFIED CHRONICITY, UNSPECIFIED PULMONARY EMBOLISM TYPE, UNSPECIFIED WHETHER ACUTE COR PULMONALE PRESENT (H): ICD-10-CM

## 2019-10-23 DIAGNOSIS — M05.79 RHEUMATOID ARTHRITIS INVOLVING MULTIPLE SITES WITH POSITIVE RHEUMATOID FACTOR (H): ICD-10-CM

## 2019-10-23 DIAGNOSIS — G47.33 OSA (OBSTRUCTIVE SLEEP APNEA): Primary | ICD-10-CM

## 2019-10-23 DIAGNOSIS — M05.741 RHEUMATOID ARTHRITIS INVOLVING BOTH HANDS WITH POSITIVE RHEUMATOID FACTOR (H): ICD-10-CM

## 2019-10-23 DIAGNOSIS — Z00.00 HEALTH CARE MAINTENANCE: ICD-10-CM

## 2019-10-23 LAB
ALT SERPL W P-5'-P-CCNC: 27 U/L (ref 0–50)
AST SERPL W P-5'-P-CCNC: 18 U/L (ref 0–45)
CREAT SERPL-MCNC: 0.7 MG/DL (ref 0.52–1.04)
GFR SERPL CREATININE-BSD FRML MDRD: >90 ML/MIN/{1.73_M2}

## 2019-10-23 PROCEDURE — G0463 HOSPITAL OUTPT CLINIC VISIT: HCPCS | Mod: ZF

## 2019-10-23 PROCEDURE — 77063 BREAST TOMOSYNTHESIS BI: CPT

## 2019-10-23 PROCEDURE — 99215 OFFICE O/P EST HI 40 MIN: CPT | Mod: GC | Performed by: STUDENT IN AN ORGANIZED HEALTH CARE EDUCATION/TRAINING PROGRAM

## 2019-10-23 RX ORDER — LEUCOVORIN CALCIUM 5 MG/1
5 TABLET ORAL
Qty: 4 TABLET | Refills: 3 | Status: SHIPPED | OUTPATIENT
Start: 2019-10-23 | End: 2020-02-18

## 2019-10-23 RX ORDER — FOLIC ACID 1 MG/1
4 TABLET ORAL DAILY
Qty: 120 TABLET | Refills: 0 | Status: SHIPPED | OUTPATIENT
Start: 2019-10-23 | End: 2020-01-10

## 2019-10-23 RX ORDER — ZOLPIDEM TARTRATE 6.25 MG/1
6.25 TABLET, FILM COATED, EXTENDED RELEASE ORAL
Qty: 1 TABLET | Refills: 0 | Status: SHIPPED | OUTPATIENT
Start: 2019-10-23 | End: 2019-12-18

## 2019-10-23 RX ORDER — SYRINGE-NEEDLE,INSULIN,0.5 ML 27GX1/2"
SYRINGE, EMPTY DISPOSABLE MISCELLANEOUS
Qty: 8 EACH | Status: SHIPPED | OUTPATIENT
Start: 2019-10-23 | End: 2020-04-27

## 2019-10-23 RX ORDER — METHOTREXATE 25 MG/ML
25 INJECTION, SOLUTION INTRA-ARTERIAL; INTRAMUSCULAR; INTRAVENOUS
Qty: 4 ML | Refills: 3 | Status: SHIPPED | OUTPATIENT
Start: 2019-10-23 | End: 2019-11-21

## 2019-10-23 ASSESSMENT — MIFFLIN-ST. JEOR
SCORE: 2341.17
SCORE: 2339.63

## 2019-10-23 ASSESSMENT — PAIN SCALES - GENERAL: PAINLEVEL: MODERATE PAIN (5)

## 2019-10-23 NOTE — PROGRESS NOTES
[facility]  Rheumatology Clinic  Everett Austin MD  10/23/2019     Name: Shira Rodriguez  MRN: [id number]  Age: 59 year old  : 1960  Referring provider: Anjel Busby     Assessment and Plan:     # Inflammatory Arthritis (+RF/+CCP, dx 2017 with diffuse polyarticular inflammatory flare): Patient relates pain for past several weeks in her left second PIP, and in the right great toe.  Exam shows minimal tenderness and no synovitis at any joint.  Blood work from  showed normal transaminases, creatinine, and CBC except for mildly decreased hemoglobin.    Seropositive rheumatoid arthritis continues suppressed on methotrexate monotherapy. Oligoarticular pain may reflect very mild inflammatory resurgence.  Patient did discontinue methotrexate for 4 weeks while hospitalized for critical illness in late September early October.  She has resumed methotrexate as of , and I believe that maximum benefit from the drug should be expected at 4 to 6 weeks after restarting it.  Recommend continuing methotrexate 25 mg subcu every week.  While on the drug, she should undergo every 3-month AST, ALT, CBC, creatinine.  She should avoid drinking more than 2 alcoholic drinks per week.  She should continue with complete smoking cessation.  She may use folate 4 mg daily and leucovorin 5 mg once after methotrexate dosing in order to alleviate symptoms of mucosal irritation related to methotrexate.    # Massive PE/cardiac arrest with PEA : patient recovering uneventfully. Will see HOT regarding coagulopathy-predisposing condition.    #Hypertension: normotensive today and formerly taking spironolactone and lisinopril, being followed by Cardiology.    #DDD, lumbar spine: recently quit work because of pain, is following with Sports Medicine. Was able to begin PT (aquatherapy) 1 month ago.    #Tobacco use: Patient has been abstinent since       Follow-up: Return in about 4 months (around 2020).     Everett  EVELYN Austin.  Staff Rheumatologist, [facility]  Pager [phone]    HPI:   Shira Rodriguez is a 59 year old female with a history of rheumatoid arthritis who presents for follow up. She was last seen on 6-126 when methotrexate was continued.    She was hospitalized in late September for sudden onset of shortness of breath.  She experienced cardiac arrest with PEA, was placed on ECMO, and underwent thrombectomy for massive pulmonary embolism.  She continued on ECMO for approximately 2 weeks, but recovered stable rhythm and was discharged to rehabilitation 10 days ago, and back to home just several days ago.  She has no memory of events following the ambulance ride to [facility], until just prior to discharge from [facility].    Today, she reports that methotrexate was held for approximately 4 weeks while she was critically ill.  She restarted injectable methotrexate just 2 weeks ago, and received her third dose today.  She reports interval pain developing in her left second PIP, and her right great toe.  Pain has not been attended by visible swelling, warmth, or redness.  There is restricted flexion of the left second PIP.  Pain in the joint is constant, not made worse in the mornings or with use.  She has not used any over-the-counter or pain remedies.  Diffuse joint pain that resulted in a diagnosis of rheumatoid arthritis and institution of immunomodulatory therapy early in 2019 has not recurred.  Morning stiffness is minimal.  Patient remains somewhat weak and exercise tolerance is impaired compared to prior to hospitalization.  She notes that after waking up in the hospital, she determined to stop smoking, and has not had a cigarette for 6 weeks.    Rheumatological history:  Referrred 2/2017 for 3 months of BL hand pain thought consistent with CTS and positive RF/CCP. EMG showed moderate median neuropathy on L and severe on R, MRI c-spine normal. She received R CT injection by sports med with  9 months of relief. She did describe intermittent episodes of swelling of her feet/ankles around this time period as well that resolved with medrol dose pack. She was re-evaluated 8/2017 after developing polyarticular inflammatory arthritis of the right knee, ankles, wrists, hands, and shoulders. She was started on MTX, given depomedrol IM injection, oral prednisone, and given R knee CS injection. MTX increased and oral prednisone tapered to off in 9/2017. Had repeat R CT injection in 10/2017 that lasted until 1/2018. R knee significantly improved following CS injection 12/2017. Has persistent pain in L heel with enthesopathy on imaging, unclear if had true enthesitis in this region due to her RA. Participated in pool therapy winter 2018 with good response. At her 3/2018 OV she continued to have significant pain in her R wrist thought due to CT, but given some ongoing persistent EMS (BL hand stiffness lasting ALL day) as well and her known seropositive disease I recommended adding Humira to her regimen. This was ordered but she never started. Methotrexate Rx continued through 2019.    Review of Systems:   Pertinent items are noted in HPI or as below, remainder of complete ROS is negative.      No recent problems with hearing or vision. No swallowing problems.   No breathing difficulty, shortness of breath, coughing, or wheezing  No chest pain or palpitations  No heart burn, indigestion, abdominal pain, nausea, vomiting, diarrhea  No urination problems, no bloody, cloudy urine, no dysuria  No numbing, tingling, weakness  No headaches or confusion  No easy bleeding or bruising.     Active Medications:     Current Outpatient Medications:      acetaminophen (TYLENOL) 325 MG tablet, Take 2 tablets (650 mg) by mouth every 6 hours as needed for mild pain or fever, Disp: , Rfl:      ARIPiprazole (ABILIFY) 5 MG tablet, Take 1 tablet (5 mg) by mouth daily, Disp: 30 tablet, Rfl: 0     atorvastatin (LIPITOR) 40 MG tablet, Take  "1 tablet (40 mg) by mouth every evening, Disp: 30 tablet, Rfl: 0     DULoxetine (CYMBALTA) 60 MG capsule, Take 1 capsule (60 mg) by mouth daily, Disp: 30 capsule, Rfl: 0     folic acid (FOLVITE) 1 MG tablet, Take 4 tablets (4 mg) by mouth daily Take 3-4 tablets daily, Disp: 120 tablet, Rfl: 0     furosemide (LASIX) 80 MG tablet, Take 1 tablet (80 mg) by mouth daily, Disp: 30 tablet, Rfl: 0     insulin syringe-needle U-100 (BD INSULIN SYRINGE ULTRAFINE) 30G X 1/2\" 1 ML miscellaneous, For methotrexate use weekly, Disp: 8 each, Rfl: prn     leucovorin (WELLCOVORIN) 5 MG tablet, Take 1 tablet (5 mg) by mouth every 7 days *take 24h after taking weekly methotrexate dose, Disp: 4 tablet, Rfl: 3     lisinopril (PRINIVIL/ZESTRIL) 5 MG tablet, Take 1 tablet (5 mg) by mouth daily, Disp: 30 tablet, Rfl: 0     methotrexate 50 MG/2ML injection, Inject 1 mL (25 mg) Subcutaneous every 7 days, Disp: 4 mL, Rfl: 3     multivitamin w/minerals (THERA-VIT-M) tablet, 1 tablet by Oral or Feeding Tube route daily, Disp: 30 tablet, Rfl: 0     nystatin (MYCOSTATIN) 425709 UNIT/GM external powder, Apply to coat rash tid, neck and breast area rashes, Disp: 60 g, Rfl: 3     potassium chloride ER (K-DUR/KLOR-CON M) 20 MEQ CR tablet, Take 1 tablet (20 mEq) by mouth daily, Disp: 30 tablet, Rfl: 0     ranitidine (ZANTAC) 150 MG tablet, Take 1 tablet (150 mg) by mouth 2 times daily, Disp: 60 tablet, Rfl: 0     vitamin B1 (THIAMINE) 100 MG tablet, 1 tablet (100 mg) by Oral or Feeding Tube route daily, Disp: 30 tablet, Rfl: 0     warfarin ANTICOAGULANT (COUMADIN) 5 MG tablet, Take 7.5 mg (1 and 1/2 tablets) by mouth on Monday, Wednesday and Friday Evenings.  Take 5 mg (1 tablet) by mouth all other days. (Patient taking differently: Take 5 mg (1 and 1/2 tablets) by mouth on Monday, Wednesday and Friday Evenings.  Take 7.5 mg (1 tablet) by mouth all other days.), Disp: 50 tablet, Rfl: 0     zolpidem ER (AMBIEN CR) 6.25 MG CR tablet, Take 1 tablet (6.25 " "mg) by mouth nightly as needed for sleep (Patient not taking: Reported on 10/23/2019), Disp: 1 tablet, Rfl: 0      Allergies:   Adhesive tape and Erythromycin      Past Medical History:  Depression   Eczema   Hyperlipidemia   Hypertension   Morbid obesity   Osteoarthrosis right knee   Sleep apnea   Tobacco use disorder   Primary localized osteoarthrosis, pelvic region and thigh  Degeneration of cervical intervertebral disc     Past Surgical History:  Left total knee arthoplasty 6/14/2012   Right hip arthroplasty 07/09/2004      Family History:   Mother: thyroid disease, hypertension, skin cancer, CVA  Paternal grandmother: breast cancer, pancreatic cancer   Father: alcoholism   Sister: thyroid disease, alcoholism   Maternal grandmother: hypertension   Sister: heart disease, multiple ablations   Paternal grandfather: prostate cancer      Social History:   Current everyday cigarette smoker, 1 pack/day, 33 years, started 1982  No smokeless tobacco use  Occasional alcohol use     Physical Exam:   /69 (BP Location: Right arm, Patient Position: Sitting, Cuff Size: Adult Regular)   Pulse 100   Temp 97.5  F (36.4  C) (Oral)   Resp 24   Ht 1.708 m (5' 7.25\")   Wt (!) 172.5 kg (380 lb 3.2 oz)   SpO2 94%   BMI 59.11 kg/m     Wt Readings from Last 4 Encounters:   10/23/19 (!) 172.5 kg (380 lb 3.2 oz)   10/23/19 (!) 172.4 kg (380 lb)   10/21/19 (!) 172.8 kg (381 lb)   10/18/19 (!) 172.8 kg (381 lb)     Constitutional: Well-developed, appearing stated age; cooperative  Eyes: Normal EOM, PERRLA, vision, conjunctiva, sclera  ENT: Normal external ears, nose, hearing, lips, teeth, gums, throat. Nice glistening saliva pool. No evidence of oral ulceration or aphthous ulcer.   Neck: No mass or thyroid enlargement  Resp: Lungs clear to auscultation, nl to palpation  CV: RRR, no murmurs, rubs or gallops, no edema  GI: No ABD mass or tenderness, no HSM  : Not tested  Lymph: No cervical, supraclavicular, inguinal or " epitrochlear nodes  MS: The wrists, MCP/PIP/DIP, ankle, and foot MTP/IP joints were examined and found normal.  Skin: There is just a slight waviness of several nails but no pitting or ridging.   Neuro: Normal cranial nerves, strength, sensation, DTRs.   Psych: Normal judgement, orientation, memory, affect.     Pulmonary function test 06/11/2019:   The FEV1 and FVC are reduced but the FEV1/FVC ratio is normal.  The inspiratory flow rates are reduced.  The diffusing capacity is normal.  However, the diffusing capacity was not corrected for the patient's hemoglobin.  The reduced volumes indicate a restrictive process.  IMPRESSION:  Moderate Restriction suggested on Spirometry. Need lung volume to confirm.    Normal Diffusion    Laboratory:   RHEUM RESULTS Latest Ref Rng & Units 10/10/2019 10/14/2019 10/22/2019   SED RATE 0 - 30 mm/h - - -   CRP, INFLAMMATION 0.0 - 8.0 mg/L - - -   RHEUMATOID FACTOR <20 IU/mL - - -   EMERY SCREEN BY EIA <1.0 - - -   AST 0 - 45 U/L 16 - 18   ALT 0 - 50 U/L 21 - 27   ALBUMIN 3.4 - 5.0 g/dL 2.9(L) - -   WBC 4.0 - 11.0 10e9/L 5.8 5.7 7.0   RBC 3.8 - 5.2 10e12/L 3.67(L) 3.49(L) 3.85   HGB 11.7 - 15.7 g/dL 10.5(L) 10.2(L) 11.6(L)   HCT 35.0 - 47.0 % 34.1(L) 33.3(L) 36.3   MCV 78 - 100 fl 93 95 94   MCHC 31.5 - 36.5 g/dL 30.8(L) 30.6(L) 32.0   RDW 10.0 - 15.0 % 16.8(H) 16.3(H) 17.1(H)    - 450 10e9/L 209 242 298   CREATININE 0.52 - 1.04 mg/dL 0.77 0.58 0.70   GFR ESTIMATE, IF BLACK >60 mL/min/[1.73:m2] >90 >90 >90   GFR ESTIMATE >60 mL/min/[1.73:m2] 85 >90 >90    - 1,620 mg/dL - - -   IGA 70 - 380 mg/dL - - -   IGM 60 - 265 mg/dL - - -   HEPATITIS C ANTIBODY NR - - -       Rheumatoid Factor   Date Value Ref Range Status   08/28/2017 51 (H) <20 IU/mL Final   ,   Cyclic Citrullinated Peptide Antibody, IgG   Date Value Ref Range Status   08/28/2017 15 (H) <7 U/mL Final     Comment:     Positive   ,  ,  ,  ,  ,  ,   EMERY Screen by EIA   Date Value Ref Range Status   12/30/2016  <1.0  Interpretation:  Negative   <1.0 Final   ,  ,  ,  ,  ,  ,  ,   Hepatitis B Core Caron   Date Value Ref Range Status   04/12/2017 Nonreactive NR Final   ,   Hep B Surface Agn   Date Value Ref Range Status   04/12/2017 Nonreactive NR Final   ,  ,  ,  ,  ,  ,  ,  ,  ,  ,  ,  ,  ,  ,  ,  ,  ,  ,  ,  ,  ,  ,   IGG   Date Value Ref Range Status   04/21/2017 940 695 - 1,620 mg/dL Final     IGA   Date Value Ref Range Status   04/21/2017 271 70 - 380 mg/dL Final     IGM   Date Value Ref Range Status   04/21/2017 395 (H) 60 - 265 mg/dL Final   ,  ,  ,  ,  ,  ,  ,

## 2019-10-23 NOTE — LETTER
10/23/2019      RE: Shira Rodriguez  [address]  [facility] [address]       [facility]  Rheumatology Clinic  Everett Austin MD  10/23/2019     Name: Shira Rodriguez  MRN: [id number]  Age: 59 year old  : 1960  Referring provider: Anjel Busby     Assessment and Plan:     # Inflammatory Arthritis (+RF/+CCP, dx 2017 with diffuse polyarticular inflammatory flare): Patient relates pain for past several weeks in her left second PIP, and in the right great toe.  Exam shows minimal tenderness and no synovitis at any joint.  Blood work from  showed normal transaminases, creatinine, and CBC except for mildly decreased hemoglobin.    Seropositive rheumatoid arthritis continues suppressed on methotrexate monotherapy. Oligoarticular pain may reflect very mild inflammatory resurgence.  Patient did discontinue methotrexate for 4 weeks while hospitalized for critical illness in late September early October.  She has resumed methotrexate as of , and I believe that maximum benefit from the drug should be expected at 4 to 6 weeks after restarting it.  Recommend continuing methotrexate 25 mg subcu every week.  While on the drug, she should undergo every 3-month AST, ALT, CBC, creatinine.  She should avoid drinking more than 2 alcoholic drinks per week.  She should continue with complete smoking cessation.  She may use folate 4 mg daily and leucovorin 5 mg once after methotrexate dosing in order to alleviate symptoms of mucosal irritation related to methotrexate.    # Massive PE/cardiac arrest with PEA : patient recovering uneventfully. Will see HOT regarding coagulopathy-predisposing condition.    #Hypertension: normotensive today and formerly taking spironolactone and lisinopril, being followed by Cardiology.    #DDD, lumbar spine: recently quit work because of pain, is following with Sports Medicine. Was able to begin PT (aquatherapy) 1 month ago.    #Tobacco use: Patient  has been abstinent since 9-19      Follow-up: Return in about 4 months (around 2/23/2020).     Everett Austin M.D.  Staff Rheumatologist,  Health  Pager [phone]    HPI:   Shira Rodriguez is a 59 year old female with a history of rheumatoid arthritis who presents for follow up. She was last seen on 6-126 when methotrexate was continued.    She was hospitalized in late September for sudden onset of shortness of breath.  She experienced cardiac arrest with PEA, was placed on ECMO, and underwent thrombectomy for massive pulmonary embolism.  She continued on ECMO for approximately 2 weeks, but recovered stable rhythm and was discharged to rehabilitation 10 days ago, and back to home just several days ago.  She has no memory of events following the ambulance ride to [facility], until just prior to discharge from [facility].    Today, she reports that methotrexate was held for approximately 4 weeks while she was critically ill.  She restarted injectable methotrexate just 2 weeks ago, and received her third dose today.  She reports interval pain developing in her left second PIP, and her right great toe.  Pain has not been attended by visible swelling, warmth, or redness.  There is restricted flexion of the left second PIP.  Pain in the joint is constant, not made worse in the mornings or with use.  She has not used any over-the-counter or pain remedies.  Diffuse joint pain that resulted in a diagnosis of rheumatoid arthritis and institution of immunomodulatory therapy early in 2019 has not recurred.  Morning stiffness is minimal.  Patient remains somewhat weak and exercise tolerance is impaired compared to prior to hospitalization.  She notes that after waking up in the hospital, she determined to stop smoking, and has not had a cigarette for 6 weeks.    Rheumatological history:  Referrred 2/2017 for 3 months of BL hand pain thought consistent with CTS and positive RF/CCP. EMG showed moderate median  neuropathy on L and severe on R, MRI c-spine normal. She received R CT injection by [facility] with 9 months of relief. She did describe intermittent episodes of swelling of her feet/ankles around this time period as well that resolved with medrol dose pack. She was re-evaluated 8/2017 after developing polyarticular inflammatory arthritis of the right knee, ankles, wrists, hands, and shoulders. She was started on MTX, given depomedrol IM injection, oral prednisone, and given R knee CS injection. MTX increased and oral prednisone tapered to off in 9/2017. Had repeat R CT injection in 10/2017 that lasted until 1/2018. R knee significantly improved following CS injection 12/2017. Has persistent pain in L heel with enthesopathy on imaging, unclear if had true enthesitis in this region due to her RA. Participated in pool therapy winter 2018 with good response. At her 3/2018 OV she continued to have significant pain in her R wrist thought due to CT, but given some ongoing persistent EMS (BL hand stiffness lasting ALL day) as well and her known seropositive disease I recommended adding Humira to her regimen. This was ordered but she never started. Methotrexate Rx continued through 2019.    Review of Systems:   Pertinent items are noted in HPI or as below, remainder of complete ROS is negative.      No recent problems with hearing or vision. No swallowing problems.   No breathing difficulty, shortness of breath, coughing, or wheezing  No chest pain or palpitations  No heart burn, indigestion, abdominal pain, nausea, vomiting, diarrhea  No urination problems, no bloody, cloudy urine, no dysuria  No numbing, tingling, weakness  No headaches or confusion  No easy bleeding or bruising.     Active Medications:     Current Outpatient Medications:      acetaminophen (TYLENOL) 325 MG tablet, Take 2 tablets (650 mg) by mouth every 6 hours as needed for mild pain or fever, Disp: , Rfl:      ARIPiprazole (ABILIFY) 5 MG tablet, Take 1  "tablet (5 mg) by mouth daily, Disp: 30 tablet, Rfl: 0     atorvastatin (LIPITOR) 40 MG tablet, Take 1 tablet (40 mg) by mouth every evening, Disp: 30 tablet, Rfl: 0     DULoxetine (CYMBALTA) 60 MG capsule, Take 1 capsule (60 mg) by mouth daily, Disp: 30 capsule, Rfl: 0     folic acid (FOLVITE) 1 MG tablet, Take 4 tablets (4 mg) by mouth daily Take 3-4 tablets daily, Disp: 120 tablet, Rfl: 0     furosemide (LASIX) 80 MG tablet, Take 1 tablet (80 mg) by mouth daily, Disp: 30 tablet, Rfl: 0     insulin syringe-needle U-100 (BD INSULIN SYRINGE ULTRAFINE) 30G X 1/2\" 1 ML miscellaneous, For methotrexate use weekly, Disp: 8 each, Rfl: prn     leucovorin (WELLCOVORIN) 5 MG tablet, Take 1 tablet (5 mg) by mouth every 7 days *take 24h after taking weekly methotrexate dose, Disp: 4 tablet, Rfl: 3     lisinopril (PRINIVIL/ZESTRIL) 5 MG tablet, Take 1 tablet (5 mg) by mouth daily, Disp: 30 tablet, Rfl: 0     methotrexate 50 MG/2ML injection, Inject 1 mL (25 mg) Subcutaneous every 7 days, Disp: 4 mL, Rfl: 3     multivitamin w/minerals (THERA-VIT-M) tablet, 1 tablet by Oral or Feeding Tube route daily, Disp: 30 tablet, Rfl: 0     nystatin (MYCOSTATIN) 899913 UNIT/GM external powder, Apply to coat rash tid, neck and breast area rashes, Disp: 60 g, Rfl: 3     potassium chloride ER (K-DUR/KLOR-CON M) 20 MEQ CR tablet, Take 1 tablet (20 mEq) by mouth daily, Disp: 30 tablet, Rfl: 0     ranitidine (ZANTAC) 150 MG tablet, Take 1 tablet (150 mg) by mouth 2 times daily, Disp: 60 tablet, Rfl: 0     vitamin B1 (THIAMINE) 100 MG tablet, 1 tablet (100 mg) by Oral or Feeding Tube route daily, Disp: 30 tablet, Rfl: 0     warfarin ANTICOAGULANT (COUMADIN) 5 MG tablet, Take 7.5 mg (1 and 1/2 tablets) by mouth on Monday, Wednesday and Friday Evenings.  Take 5 mg (1 tablet) by mouth all other days. (Patient taking differently: Take 5 mg (1 and 1/2 tablets) by mouth on Monday, Wednesday and Friday Evenings.  Take 7.5 mg (1 tablet) by mouth all other " "days.), Disp: 50 tablet, Rfl: 0     zolpidem ER (AMBIEN CR) 6.25 MG CR tablet, Take 1 tablet (6.25 mg) by mouth nightly as needed for sleep (Patient not taking: Reported on 10/23/2019), Disp: 1 tablet, Rfl: 0      Allergies:   Adhesive tape and Erythromycin      Past Medical History:  Depression   Eczema   Hyperlipidemia   Hypertension   Morbid obesity   Osteoarthrosis right knee   Sleep apnea   Tobacco use disorder   Primary localized osteoarthrosis, pelvic region and thigh  Degeneration of cervical intervertebral disc     Past Surgical History:  Left total knee arthoplasty 6/14/2012   Right hip arthroplasty 07/09/2004      Family History:   Mother: thyroid disease, hypertension, skin cancer, CVA  Paternal grandmother: breast cancer, pancreatic cancer   Father: alcoholism   Sister: thyroid disease, alcoholism   Maternal grandmother: hypertension   Sister: heart disease, multiple ablations   Paternal grandfather: prostate cancer      Social History:   Current everyday cigarette smoker, 1 pack/day, 33 years, started 1982  No smokeless tobacco use  Occasional alcohol use     Physical Exam:   /69 (BP Location: Right arm, Patient Position: Sitting, Cuff Size: Adult Regular)   Pulse 100   Temp 97.5  F (36.4  C) (Oral)   Resp 24   Ht 1.708 m (5' 7.25\")   Wt (!) 172.5 kg (380 lb 3.2 oz)   SpO2 94%   BMI 59.11 kg/m      Wt Readings from Last 4 Encounters:   10/23/19 (!) 172.5 kg (380 lb 3.2 oz)   10/23/19 (!) 172.4 kg (380 lb)   10/21/19 (!) 172.8 kg (381 lb)   10/18/19 (!) 172.8 kg (381 lb)     Constitutional: Well-developed, appearing stated age; cooperative  Eyes: Normal EOM, PERRLA, vision, conjunctiva, sclera  ENT: Normal external ears, nose, hearing, lips, teeth, gums, throat. Nice glistening saliva pool. No evidence of oral ulceration or aphthous ulcer.   Neck: No mass or thyroid enlargement  Resp: Lungs clear to auscultation, nl to palpation  CV: RRR, no murmurs, rubs or gallops, no edema  GI: No ABD " mass or tenderness, no HSM  : Not tested  Lymph: No cervical, supraclavicular, inguinal or epitrochlear nodes  MS: The wrists, MCP/PIP/DIP, ankle, and foot MTP/IP joints were examined and found normal.  Skin: There is just a slight waviness of several nails but no pitting or ridging.   Neuro: Normal cranial nerves, strength, sensation, DTRs.   Psych: Normal judgement, orientation, memory, affect.     Pulmonary function test 06/11/2019:   The FEV1 and FVC are reduced but the FEV1/FVC ratio is normal.  The inspiratory flow rates are reduced.  The diffusing capacity is normal.  However, the diffusing capacity was not corrected for the patient's hemoglobin.  The reduced volumes indicate a restrictive process.  IMPRESSION:  Moderate Restriction suggested on Spirometry. Need lung volume to confirm.    Normal Diffusion    Laboratory:   RHEUM RESULTS Latest Ref Rng & Units 10/10/2019 10/14/2019 10/22/2019   SED RATE 0 - 30 mm/h - - -   CRP, INFLAMMATION 0.0 - 8.0 mg/L - - -   RHEUMATOID FACTOR <20 IU/mL - - -   EMERY SCREEN BY EIA <1.0 - - -   AST 0 - 45 U/L 16 - 18   ALT 0 - 50 U/L 21 - 27   ALBUMIN 3.4 - 5.0 g/dL 2.9(L) - -   WBC 4.0 - 11.0 10e9/L 5.8 5.7 7.0   RBC 3.8 - 5.2 10e12/L 3.67(L) 3.49(L) 3.85   HGB 11.7 - 15.7 g/dL 10.5(L) 10.2(L) 11.6(L)   HCT 35.0 - 47.0 % 34.1(L) 33.3(L) 36.3   MCV 78 - 100 fl 93 95 94   MCHC 31.5 - 36.5 g/dL 30.8(L) 30.6(L) 32.0   RDW 10.0 - 15.0 % 16.8(H) 16.3(H) 17.1(H)    - 450 10e9/L 209 242 298   CREATININE 0.52 - 1.04 mg/dL 0.77 0.58 0.70   GFR ESTIMATE, IF BLACK >60 mL/min/[1.73:m2] >90 >90 >90   GFR ESTIMATE >60 mL/min/[1.73:m2] 85 >90 >90    - 1,620 mg/dL - - -   IGA 70 - 380 mg/dL - - -   IGM 60 - 265 mg/dL - - -   HEPATITIS C ANTIBODY NR - - -       Rheumatoid Factor   Date Value Ref Range Status   08/28/2017 51 (H) <20 IU/mL Final   ,   Cyclic Citrullinated Peptide Antibody, IgG   Date Value Ref Range Status   08/28/2017 15 (H) <7 U/mL Final     Comment:      Positive   ,  ,  ,  ,  ,  ,   EMERY Screen by EIA   Date Value Ref Range Status   12/30/2016 <1.0  Interpretation:  Negative   <1.0 Final   ,  ,  ,  ,  ,  ,  ,   Hepatitis B Core Caron   Date Value Ref Range Status   04/12/2017 Nonreactive NR Final   ,   Hep B Surface Agn   Date Value Ref Range Status   04/12/2017 Nonreactive NR Final   ,  ,  ,  ,  ,  ,  ,  ,  ,  ,  ,  ,  ,  ,  ,  ,  ,  ,  ,  ,  ,  ,   IGG   Date Value Ref Range Status   04/21/2017 940 695 - 1,620 mg/dL Final     IGA   Date Value Ref Range Status   04/21/2017 271 70 - 380 mg/dL Final     IGM   Date Value Ref Range Status   04/21/2017 395 (H) 60 - 265 mg/dL Final   ,  ,  ,  ,  ,  ,  ,            Everett Austin MD

## 2019-10-23 NOTE — NURSING NOTE
"Chief Complaint   Patient presents with     RECHECK     RA       Vital signs:  Temp: 97.5  F (36.4  C) Temp src: Oral BP: 108/69 Pulse: 100   Resp: 24 SpO2: 94 %     Height: 170.8 cm (5' 7.25\") Weight: (!) 172.5 kg (380 lb 3.2 oz)  Estimated body mass index is 59.11 kg/m  as calculated from the following:    Height as of this encounter: 1.708 m (5' 7.25\").    Weight as of this encounter: 172.5 kg (380 lb 3.2 oz).        Neeta Davila, [facility]  10/23/2019 1:11 PM      "

## 2019-10-23 NOTE — PROGRESS NOTES
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 Ms Rodriguez was last seen in the sleep clinic by Dr Greenfield on 4/2019 following a sleep study. She felt good on CPAP alone and the plan was to continue on CPAP at home with pressures of 9 - 17 cm of H2O. Unfortunately, Ms Owen reports that a few weeks after using the CPAP she had the same issues as she had had with the BiPAP. She reports that she would have no trouble falling a sleep wearing the CPAP but when she would wake up she would see that she had removed the CPAP in the middle of the night and that it would be on the floor. She would have no recollection of doing this. She would usually wear the CPAP for ~2 hours. As a result of this, she stopped using her CPAP and did not follow up.     Ms Rodriguez reports that on 9/9/2019 she presented to [facility] ED with shortness of breath. She was found to have a massive pulmonary embolism and had a PEA arrest in the ED. She was transferred to the [facility] for thrombectomy and ECMO. She has had an excellent recovery and was discharged from a rehab facility on 10/14. She was advised to follow up with Sleep Medicine for her CYRUS following her hospitalilzation.    She has lost 20 lb since her hospitalization and plans on continuing to lose more weight. She has stopped smoking since her hospitalization as well. She is currently using a walker due to deconditioning as a result of her hospital stay. Previously she only used a walker for long distances.     She still elevates the head of the bed by 30-45 degrees while sleeping which seems to help. She usually sleeps on her back and has difficulty sleeping on the side due to osteoarthritis.     Of note, she reports that during her hospital stay her sats would  "drop to the low 90s while sleeping and she would be put on O2 (1-2L). She was also found to have atrial fibrillation and is currently on coumadin for atrial fibrillation and her PE. She is continuing follow up with cardiology (for her PEA arrest and her prior documented episodes of asystole).     Allergies:    Allergies   Allergen Reactions     Adhesive Tape Blisters     Erythromycin Rash       Medications:    Current Outpatient Medications   Medication Sig Dispense Refill     acetaminophen (TYLENOL) 325 MG tablet Take 2 tablets (650 mg) by mouth every 6 hours as needed for mild pain or fever       ARIPiprazole (ABILIFY) 5 MG tablet Take 1 tablet (5 mg) by mouth daily 30 tablet 0     atorvastatin (LIPITOR) 40 MG tablet Take 1 tablet (40 mg) by mouth every evening 30 tablet 0     DULoxetine (CYMBALTA) 60 MG capsule Take 1 capsule (60 mg) by mouth daily 30 capsule 0     folic acid (FOLVITE) 1 MG tablet Take 4 tablets (4 mg) by mouth daily Take 3-4 tablets daily 120 tablet 0     furosemide (LASIX) 80 MG tablet Take 1 tablet (80 mg) by mouth daily 30 tablet 0     insulin syringe-needle U-100 (BD INSULIN SYRINGE ULTRAFINE) 30G X 1/2\" 1 ML For methotrexate use weekly 8 each prn     leucovorin (WELLCOVORIN) 5 MG tablet Take 1 tablet (5 mg) by mouth every 7 days *take 24h after taking weekly methotrexate dose 4 tablet 0     lisinopril (PRINIVIL/ZESTRIL) 5 MG tablet Take 1 tablet (5 mg) by mouth daily 30 tablet 0     methotrexate 50 MG/2ML injection CHEMO Inject 1 mL (25 mg) Subcutaneous every 7 days 4 mL 0     multivitamin w/minerals (THERA-VIT-M) tablet 1 tablet by Oral or Feeding Tube route daily 30 tablet 0     nystatin (MYCOSTATIN) 191320 UNIT/GM external powder Apply to coat rash tid, neck and breast area rashes 60 g 3     potassium chloride ER (K-DUR/KLOR-CON M) 20 MEQ CR tablet Take 1 tablet (20 mEq) by mouth daily 30 tablet 0     ranitidine (ZANTAC) 150 MG tablet Take 1 tablet (150 mg) by mouth 2 times daily 60 " tablet 0     vitamin B1 (THIAMINE) 100 MG tablet 1 tablet (100 mg) by Oral or Feeding Tube route daily 30 tablet 0     warfarin ANTICOAGULANT (COUMADIN) 5 MG tablet Take 7.5 mg (1 and 1/2 tablets) by mouth on Monday, Wednesday and Friday Evenings.  Take 5 mg (1 tablet) by mouth all other days. 50 tablet 0     zolpidem ER (AMBIEN CR) 6.25 MG CR tablet Take 1 tablet (6.25 mg) by mouth nightly as needed for sleep 1 tablet 0       Problem List:  Patient Active Problem List    Diagnosis Date Noted     Cardiac arrest (H) 10/07/2019     Priority: Medium     Pulmonary emboli (H) 09/09/2019     Priority: Medium     Rheumatoid arthritis (H) 08/28/2017     Priority: Medium     CYRUS (obstructive sleep apnea) 01/04/2017     Priority: Medium     1/11/2017(401#)-AHI 80, RDI 90, lowest oxygen saturation was 83%, bilevel PAP 19/13 cm/H20 effective.   1/30/2016-AHI 16.6, lowest oxygen saturation was 89%       Elevated blood pressure (not hypertension) 01/04/2017     Priority: Medium     Degeneration of cervical intervertebral disc 12/26/2016     Priority: Medium     Arthritis of knee, degenerative 06/14/2012     Priority: Medium     Morbid obesity (H) 01/11/2012     Priority: Medium     Adjustment disorder with depressed mood 10/27/2011     Priority: Medium     Tobacco use disorder 10/27/2011     Priority: Medium     Primary localized osteoarthrosis, pelvic region and thigh 06/02/2004     Priority: Medium        Past Medical/Surgical History:  Past Medical History:   Diagnosis Date     Chronic bronchitis (H) 07/30/2015     Contact dermatitis      Depressive disorder 1985     Eczema     HANDS     Elevated liver enzymes      H/O total knee replacement, left      History of total hip replacement 10/27/2011     Hyperlipidemia      Hypertension      Morbid obesity (H)      Osteoarthrosis     right knee     Sleep apnea      Tobacco use disorder      Past Surgical History:   Procedure Laterality Date     ARTHROPLASTY KNEE  6/14/2012     Procedure: ARTHROPLASTY KNEE;  Left Total Knee Arthroplasty;  Surgeon: Annette Quesada MD;  Location: [facility] OR     ARTHROSCOPY HIP Right      BRONCHOSCOPY FLEXIBLE AND RIGID N/A 9/17/2019    Procedure: Bronchoscopy flexible;  Surgeon: Patrick Rayo MD;  Location: [facility] OR     C TOTAL HIP ARTHROPLASTY  07/09/04    RT     CV EXTRACORPERAL MEMBRANE OXYGENATION N/A 9/9/2019    Procedure: Extracorporeal Membrance Oxygenation;  Surgeon: Kaleb Mcfarlane MD;  Location: [facility]     INSERT EXTRACORPORAL MEMBRANE OXYGENATOR N/A 9/17/2019    Procedure: Venous-Venous cannulation using ultrasound guidance and transesophageal echocardiogram, venous-arterial ecmo decannulation and repair of left femoral artery;  Surgeon: Patrick Rayo MD;  Location: [facility] OR     IR MECH THROMB PRIM ART, NON-INTRACRNL  9/10/2019     IR PULMONARY ANGIOGRAM BILATERAL  9/10/2019     IR THROMBOLYSIS ARTERIAL INFUSION INITIAL DAY  9/10/2019       Social History:  Recently discharged home (10/14) from rehab facility. Her mother has moved in with her during this time. She stopped smoking since her PE on 9/9. Lost 20lb in the hospital and is planning on losing more weight.     Family History:  Family History   Problem Relation Age of Onset     Thyroid Disease Mother      Hypertension Mother      Skin Cancer Mother         MMohs surgery with skin graft     Cerebrovascular Disease Mother         CVA after endarderectomy     Diabetes Mother      Breast Cancer Paternal Grandmother      Pancreatic Cancer Paternal Grandmother      Cardiovascular Paternal Aunt      Cardiovascular Paternal Uncle      Depression Other      Alcoholism Father      Thyroid Disease Sister      Alcoholism Sister      Hypertension Maternal Grandmother      Heart Disease Sister         multiple ablations     Alcoholism Sister      Prostate Cancer Paternal Grandfather        Review of Systems:  A complete review of systems reviewed by me is negative with  "the exeption of what has been mentioned in the history of present illness.  CONSTITUTIONAL: NEGATIVE for fever, chills, sweats or night sweats, drug allergies. POSITIVE for weight loss.  EYES: NEGATIVE for changes in vision, blind spots, double vision.  ENT: NEGATIVE for ear pain, sore throat, sinus pain, post-nasal drip, runny nose, bloody nose  CARDIAC: NEGATIVE for fast heartbeats or fluttering in chest, chest pain or pressure, breathlessness when lying flat, swollen legs or swollen feet.  NEUROLOGIC: NEGATIVE headaches, weakness or numbness in the arms or legs.  DERMATOLOGIC: NEGATIVE for rashes, new moles or change in mole(s)  PULMONARY: NEGATIVE SOB at rest, dry cough, productive cough, coughing up blood, wheezing or whistling when breathing.  POSITIVE for SOB with activity.   GASTROINTESTINAL: NEGATIVE for nausea or vomitting, loose or watery stools, fat or grease in stools, constipation, abdominal pain, bowel movements black in color or blood noted.  GENITOURINARY: NEGATIVE for pain during urination, blood in urine, urinating more frequently than usual, irregular menstrual periods.  MUSCULOSKELETAL: NEGATIVE for muscle pain, bone or joint pain, swollen joints.  ENDOCRINE: NEGATIVE for increased thirst or urination, diabetes.  LYMPHATIC: NEGATIVE for swollen lymph nodes, lumps or bumps in the breasts or nipple discharge.    Physical Examination:  Vitals: /78   Pulse 98   Resp 20   Ht 1.707 m (5' 7.21\")   Wt (!) 172.4 kg (380 lb)   SpO2 96%   BMI 59.15 kg/m    BMI= Body mass index is 59.15 kg/m .       Stafford Total Score 10/23/2019   Total score - Stafford 6     GENERAL APPEARANCE: alert, no distress, morbidly obese   EYES: Eyes grossly normal to inspection, PERRL and conjunctivae and sclerae normal  HENT: normal and nose and mouth without ulcers or lesions  NECK: no asymmetry or massess  NEURO: Oriented to t/p/p. Following commands appropriately. Speech normal. Normal extraocular movements with no " nystagmus. No facial asymmetry. Facial sensation intact. Tongue midline. No pronator drift. Normal tone. FNF intact. Light touch intact in UE and LE. Strength 5/5 in upper extremities. 4+/5 in lower extremities. Reflexes +2 in biceps, brachioradialis. +1 in knees and ankles.     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 She is returning to the Sleep Clinic due to inability to tolerate CPAP at night (takes it off while sleeping). Given her recent admission with a massive PE and PEA arrest, as well as morbid obesity, adherence to CPAP  for treatment of CYRUS is prudent. We discussed option of adding Zolpidem at bedtime as this might aid in preventing removal of CPAP while sleeping. Given her recent admission with PE and PEA arrest, we discussed that it might be best to proceed with this plan with sleep study first so that she is closely monitored for worsening respiratory status or (less likely) cardiac events. Therefore, we agreed to repeat the sleep study with the addition of Zolpidem to see if this plan would be effective.     We also encouraged Mr Rodriguez to maintain smoking cessation and to continue working on losing weight.     She should not drive if tired or sleepy.    She will follow up with Dr Love after her sleep study has been competed to review the results and discuss plan of care.       Patient was seen and discussed with Dr Love who agrees with the assessment and plan.     Margarette Orosco  Neurology Resident, PGY3  Pager: [phone]    Sleep Staff Addendum  I have seen and evaluated the patient today with Dr Orosco and agree with the documentation.      ANALISA LOVE MD       CC: Anjel Busby

## 2019-10-23 NOTE — LETTER
10/23/2019       RE: Shira Rodriguez  [address] [facility]  [place] [address]     Dear Colleague,    Thank you for referring your patient, Shira Rodriguez, to the [facility] RHEUMATOLOGY at [facility]. Please see a copy of my visit note below.    [facility]  Rheumatology Clinic  Everett Austin MD  10/23/2019     Name: Shira Rodriguez  MRN: [id number]  Age: 59 year old  : 1960  Referring provider: Anjel Busby     Assessment and Plan:     # Inflammatory Arthritis (+RF/+CCP, dx 2017 with diffuse polyarticular inflammatory flare): Patient relates pain for past several weeks in her left second PIP, and in the right great toe.  Exam shows minimal tenderness and no synovitis at any joint.  Blood work from  showed normal transaminases, creatinine, and CBC except for mildly decreased hemoglobin.    Seropositive rheumatoid arthritis continues suppressed on methotrexate monotherapy. Oligoarticular pain may reflect very mild inflammatory resurgence.  Patient did discontinue methotrexate for 4 weeks while hospitalized for critical illness in late September early October.  She has resumed methotrexate as of , and I believe that maximum benefit from the drug should be expected at 4 to 6 weeks after restarting it.  Recommend continuing methotrexate 25 mg subcu every week.  While on the drug, she should undergo every 3-month AST, ALT, CBC, creatinine.  She should avoid drinking more than 2 alcoholic drinks per week.  She should continue with complete smoking cessation.  She may use folate 4 mg daily and leucovorin 5 mg once after methotrexate dosing in order to alleviate symptoms of mucosal irritation related to methotrexate.    # Massive PE/cardiac arrest with PEA : patient recovering uneventfully. Will see HOT regarding coagulopathy-predisposing condition.    #Hypertension: normotensive today and formerly taking spironolactone and  lisinopril, being followed by Cardiology.    #DDD, lumbar spine: recently quit work because of pain, is following with Sports Medicine. Was able to begin PT (aquatherapy) 1 month ago.    #Tobacco use: Patient has been abstinent since 9-19      Follow-up: Return in about 4 months (around 2/23/2020).     Everett Austin M.D.  Staff Rheumatologist, [facility]  Pager [phone]    HPI:   Shira Rodriguez is a 59 year old female with a history of rheumatoid arthritis who presents for follow up. She was last seen on 6-126 when methotrexate was continued.    She was hospitalized in late September for sudden onset of shortness of breath.  She experienced cardiac arrest with PEA, was placed on ECMO, and underwent thrombectomy for massive pulmonary embolism.  She continued on ECMO for approximately 2 weeks, but recovered stable rhythm and was discharged to rehabilitation 10 days ago, and back to home just several days ago.  She has no memory of events following the ambulance ride to [facility], until just prior to discharge from [facility].    Today, she reports that methotrexate was held for approximately 4 weeks while she was critically ill.  She restarted injectable methotrexate just 2 weeks ago, and received her third dose today.  She reports interval pain developing in her left second PIP, and her right great toe.  Pain has not been attended by visible swelling, warmth, or redness.  There is restricted flexion of the left second PIP.  Pain in the joint is constant, not made worse in the mornings or with use.  She has not used any over-the-counter or pain remedies.  Diffuse joint pain that resulted in a diagnosis of rheumatoid arthritis and institution of immunomodulatory therapy early in 2019 has not recurred.  Morning stiffness is minimal.  Patient remains somewhat weak and exercise tolerance is impaired compared to prior to hospitalization.  She notes that after waking up in the hospital, she determined  to stop smoking, and has not had a cigarette for 6 weeks.    Rheumatological history:  Referrred 2/2017 for 3 months of BL hand pain thought consistent with CTS and positive RF/CCP. EMG showed moderate median neuropathy on L and severe on R, MRI c-spine normal. She received R CT injection by sports med with 9 months of relief. She did describe intermittent episodes of swelling of her feet/ankles around this time period as well that resolved with medrol dose pack. She was re-evaluated 8/2017 after developing polyarticular inflammatory arthritis of the right knee, ankles, wrists, hands, and shoulders. She was started on MTX, given depomedrol IM injection, oral prednisone, and given R knee CS injection. MTX increased and oral prednisone tapered to off in 9/2017. Had repeat R CT injection in 10/2017 that lasted until 1/2018. R knee significantly improved following CS injection 12/2017. Has persistent pain in L heel with enthesopathy on imaging, unclear if had true enthesitis in this region due to her RA. Participated in pool therapy winter 2018 with good response. At her 3/2018 OV she continued to have significant pain in her R wrist thought due to CT, but given some ongoing persistent EMS (BL hand stiffness lasting ALL day) as well and her known seropositive disease I recommended adding Humira to her regimen. This was ordered but she never started. Methotrexate Rx continued through 2019.    Review of Systems:   Pertinent items are noted in HPI or as below, remainder of complete ROS is negative.      No recent problems with hearing or vision. No swallowing problems.   No breathing difficulty, shortness of breath, coughing, or wheezing  No chest pain or palpitations  No heart burn, indigestion, abdominal pain, nausea, vomiting, diarrhea  No urination problems, no bloody, cloudy urine, no dysuria  No numbing, tingling, weakness  No headaches or confusion  No easy bleeding or bruising.     Active Medications:     Current  "Outpatient Medications:      acetaminophen (TYLENOL) 325 MG tablet, Take 2 tablets (650 mg) by mouth every 6 hours as needed for mild pain or fever, Disp: , Rfl:      ARIPiprazole (ABILIFY) 5 MG tablet, Take 1 tablet (5 mg) by mouth daily, Disp: 30 tablet, Rfl: 0     atorvastatin (LIPITOR) 40 MG tablet, Take 1 tablet (40 mg) by mouth every evening, Disp: 30 tablet, Rfl: 0     DULoxetine (CYMBALTA) 60 MG capsule, Take 1 capsule (60 mg) by mouth daily, Disp: 30 capsule, Rfl: 0     folic acid (FOLVITE) 1 MG tablet, Take 4 tablets (4 mg) by mouth daily Take 3-4 tablets daily, Disp: 120 tablet, Rfl: 0     furosemide (LASIX) 80 MG tablet, Take 1 tablet (80 mg) by mouth daily, Disp: 30 tablet, Rfl: 0     insulin syringe-needle U-100 (BD INSULIN SYRINGE ULTRAFINE) 30G X 1/2\" 1 ML miscellaneous, For methotrexate use weekly, Disp: 8 each, Rfl: prn     leucovorin (WELLCOVORIN) 5 MG tablet, Take 1 tablet (5 mg) by mouth every 7 days *take 24h after taking weekly methotrexate dose, Disp: 4 tablet, Rfl: 3     lisinopril (PRINIVIL/ZESTRIL) 5 MG tablet, Take 1 tablet (5 mg) by mouth daily, Disp: 30 tablet, Rfl: 0     methotrexate 50 MG/2ML injection, Inject 1 mL (25 mg) Subcutaneous every 7 days, Disp: 4 mL, Rfl: 3     multivitamin w/minerals (THERA-VIT-M) tablet, 1 tablet by Oral or Feeding Tube route daily, Disp: 30 tablet, Rfl: 0     nystatin (MYCOSTATIN) 920927 UNIT/GM external powder, Apply to coat rash tid, neck and breast area rashes, Disp: 60 g, Rfl: 3     potassium chloride ER (K-DUR/KLOR-CON M) 20 MEQ CR tablet, Take 1 tablet (20 mEq) by mouth daily, Disp: 30 tablet, Rfl: 0     ranitidine (ZANTAC) 150 MG tablet, Take 1 tablet (150 mg) by mouth 2 times daily, Disp: 60 tablet, Rfl: 0     vitamin B1 (THIAMINE) 100 MG tablet, 1 tablet (100 mg) by Oral or Feeding Tube route daily, Disp: 30 tablet, Rfl: 0     warfarin ANTICOAGULANT (COUMADIN) 5 MG tablet, Take 7.5 mg (1 and 1/2 tablets) by mouth on Monday, Wednesday and Friday " "Evenings.  Take 5 mg (1 tablet) by mouth all other days. (Patient taking differently: Take 5 mg (1 and 1/2 tablets) by mouth on Monday, Wednesday and Friday Evenings.  Take 7.5 mg (1 tablet) by mouth all other days.), Disp: 50 tablet, Rfl: 0     zolpidem ER (AMBIEN CR) 6.25 MG CR tablet, Take 1 tablet (6.25 mg) by mouth nightly as needed for sleep (Patient not taking: Reported on 10/23/2019), Disp: 1 tablet, Rfl: 0      Allergies:   Adhesive tape and Erythromycin      Past Medical History:  Depression   Eczema   Hyperlipidemia   Hypertension   Morbid obesity   Osteoarthrosis right knee   Sleep apnea   Tobacco use disorder   Primary localized osteoarthrosis, pelvic region and thigh  Degeneration of cervical intervertebral disc     Past Surgical History:  Left total knee arthoplasty 6/14/2012   Right hip arthroplasty 07/09/2004      Family History:   Mother: thyroid disease, hypertension, skin cancer, CVA  Paternal grandmother: breast cancer, pancreatic cancer   Father: alcoholism   Sister: thyroid disease, alcoholism   Maternal grandmother: hypertension   Sister: heart disease, multiple ablations   Paternal grandfather: prostate cancer      Social History:   Current everyday cigarette smoker, 1 pack/day, 33 years, started 1982  No smokeless tobacco use  Occasional alcohol use     Physical Exam:   /69 (BP Location: Right arm, Patient Position: Sitting, Cuff Size: Adult Regular)   Pulse 100   Temp 97.5  F (36.4  C) (Oral)   Resp 24   Ht 1.708 m (5' 7.25\")   Wt (!) 172.5 kg (380 lb 3.2 oz)   SpO2 94%   BMI 59.11 kg/m      Wt Readings from Last 4 Encounters:   10/23/19 (!) 172.5 kg (380 lb 3.2 oz)   10/23/19 (!) 172.4 kg (380 lb)   10/21/19 (!) 172.8 kg (381 lb)   10/18/19 (!) 172.8 kg (381 lb)     Constitutional: Well-developed, appearing stated age; cooperative  Eyes: Normal EOM, PERRLA, vision, conjunctiva, sclera  ENT: Normal external ears, nose, hearing, lips, teeth, gums, throat. Nice glistening saliva " pool. No evidence of oral ulceration or aphthous ulcer.   Neck: No mass or thyroid enlargement  Resp: Lungs clear to auscultation, nl to palpation  CV: RRR, no murmurs, rubs or gallops, no edema  GI: No ABD mass or tenderness, no HSM  : Not tested  Lymph: No cervical, supraclavicular, inguinal or epitrochlear nodes  MS: The wrists, MCP/PIP/DIP, ankle, and foot MTP/IP joints were examined and found normal.  Skin: There is just a slight waviness of several nails but no pitting or ridging.   Neuro: Normal cranial nerves, strength, sensation, DTRs.   Psych: Normal judgement, orientation, memory, affect.     Pulmonary function test 06/11/2019:   The FEV1 and FVC are reduced but the FEV1/FVC ratio is normal.  The inspiratory flow rates are reduced.  The diffusing capacity is normal.  However, the diffusing capacity was not corrected for the patient's hemoglobin.  The reduced volumes indicate a restrictive process.  IMPRESSION:  Moderate Restriction suggested on Spirometry. Need lung volume to confirm.    Normal Diffusion    Laboratory:   RHEUM RESULTS Latest Ref Rng & Units 10/10/2019 10/14/2019 10/22/2019   SED RATE 0 - 30 mm/h - - -   CRP, INFLAMMATION 0.0 - 8.0 mg/L - - -   RHEUMATOID FACTOR <20 IU/mL - - -   EMERY SCREEN BY EIA <1.0 - - -   AST 0 - 45 U/L 16 - 18   ALT 0 - 50 U/L 21 - 27   ALBUMIN 3.4 - 5.0 g/dL 2.9(L) - -   WBC 4.0 - 11.0 10e9/L 5.8 5.7 7.0   RBC 3.8 - 5.2 10e12/L 3.67(L) 3.49(L) 3.85   HGB 11.7 - 15.7 g/dL 10.5(L) 10.2(L) 11.6(L)   HCT 35.0 - 47.0 % 34.1(L) 33.3(L) 36.3   MCV 78 - 100 fl 93 95 94   MCHC 31.5 - 36.5 g/dL 30.8(L) 30.6(L) 32.0   RDW 10.0 - 15.0 % 16.8(H) 16.3(H) 17.1(H)    - 450 10e9/L 209 242 298   CREATININE 0.52 - 1.04 mg/dL 0.77 0.58 0.70   GFR ESTIMATE, IF BLACK >60 mL/min/[1.73:m2] >90 >90 >90   GFR ESTIMATE >60 mL/min/[1.73:m2] 85 >90 >90    - 1,620 mg/dL - - -   IGA 70 - 380 mg/dL - - -   IGM 60 - 265 mg/dL - - -   HEPATITIS C ANTIBODY NR - - -       Rheumatoid  Factor   Date Value Ref Range Status   08/28/2017 51 (H) <20 IU/mL Final   ,   Cyclic Citrullinated Peptide Antibody, IgG   Date Value Ref Range Status   08/28/2017 15 (H) <7 U/mL Final     Comment:     Positive   ,  ,  ,  ,  ,  ,   EMERY Screen by EIA   Date Value Ref Range Status   12/30/2016 <1.0  Interpretation:  Negative   <1.0 Final   ,  ,  ,  ,  ,  ,  ,   Hepatitis B Core Caron   Date Value Ref Range Status   04/12/2017 Nonreactive NR Final   ,   Hep B Surface Agn   Date Value Ref Range Status   04/12/2017 Nonreactive NR Final   ,  ,  ,  ,  ,  ,  ,  ,  ,  ,  ,  ,  ,  ,  ,  ,  ,  ,  ,  ,  ,  ,   IGG   Date Value Ref Range Status   04/21/2017 940 695 - 1,620 mg/dL Final     IGA   Date Value Ref Range Status   04/21/2017 271 70 - 380 mg/dL Final     IGM   Date Value Ref Range Status   04/21/2017 395 (H) 60 - 265 mg/dL Final   ,  ,  ,  ,  ,  ,  ,            Again, thank you for allowing me to participate in the care of your patient.      Sincerely,    Everett Austin MD

## 2019-10-23 NOTE — PATIENT INSTRUCTIONS
Dx;  1. Inflammatory arthritis, possible low-grade activity in hands    Plan:  1. Continue methotrexate 25 mg injected weekly  2. Leukovorin, folate daily  3. bloodwork in 3 months--labs already ordered    Expect gradual improvement over coming 3-4 weeks.    Continue off the cigarettes.

## 2019-10-23 NOTE — PROGRESS NOTES
ANTICOAGULATION FOLLOW-UP CLINIC VISIT    Patient Name:  Shira Rodrgiuez  Date:  10/23/2019  Contact Type:  Telephone    SUBJECTIVE:  Patient Findings     Comments:   Recommended patient take 5mg Mon, Wed, Friday.  With 10/23 (Wed) as an exception.  Shira verbalized understanding.  Updated calendar.        Clinical Outcomes     Comments:   Recommended patient take 5mg Mon, Wed, Friday.  With 10/23 (Wed) as an exception.  Shira verbalized understanding.  Updated calendar.           OBJECTIVE    INR   Date Value Ref Range Status   10/22/2019 2.18 (H) 0.86 - 1.14 Final       ASSESSMENT / PLAN  INR assessment THER    Recheck INR In: 1 WEEK    INR Location Clinic      Anticoagulation Summary  As of 10/23/2019    INR goal:   2.0-3.0   TTR:   100.0 % (1 d)   INR used for dosin.18 (10/22/2019)   Warfarin maintenance plan:   5 mg (5 mg x 1) every Mon, Wed, Fri; 7.5 mg (5 mg x 1.5) all other days   Full warfarin instructions:   10/23: 7.5 mg; Otherwise 5 mg every Mon, Wed, Fri; 7.5 mg all other days   Weekly warfarin total:   45 mg   Plan last modified:   Rich Noe RN (10/23/2019)   Next INR check:   10/29/2019   Target end date:       Indications    Pulmonary emboli (H) [I26.99]             Anticoagulation Episode Summary     INR check location:       Preferred lab:       Send INR reminders to:   [facility]    Comments:   HIPPA Forms mailed 10/11/19      Anticoagulation Care Providers     Provider Role Specialty Phone number    JakesavAnjel pelaez MD [facility] [phone]            See the Encounter Report to view Anticoagulation Flowsheet and Dosing Calendar (Go to Encounters tab in chart review, and find the Anticoagulation Therapy Visit)    INR/CFX/F2 RESULT: INR is 2.18 on 10/22    ASSESSMENT: No Problems found. No Changes in Health, Medications, or diet. No Signs of bruising, bleeding or clotting.  Spoke to patient.    DOSING ADJUSTMENT: Discussed with Pharmacist Nicholas Saldana  [facility] for patient's new Anticoagulation recommendation.  Increased maintenance dose by 6%.  Recommended 5mg MWF, 7.5mg all other days.  Today one time dose adjustment to 7.5mg.    NEXT INR/FACTOR X OR FACTOR II: 10/29    PROTOCOL FOLLOWED: goal 2-3, initiation post TCU discharge.    Rich Noe RN

## 2019-10-23 NOTE — NURSING NOTE
"Chief Complaint   Patient presents with     CPAP Follow Up     Follow up varghese, recent hospitalization, not currently using cpap machine        Initial /78   Pulse 98   Resp 20   Ht 1.707 m (5' 7.21\")   Wt (!) 172.4 kg (380 lb)   SpO2 96%   BMI 59.15 kg/m   Estimated body mass index is 59.15 kg/m  as calculated from the following:    Height as of this encounter: 1.707 m (5' 7.21\").    Weight as of this encounter: 172.4 kg (380 lb).    Medication Reconciliation: complete    ESS 6    Danae Ness MA      "

## 2019-10-23 NOTE — PATIENT INSTRUCTIONS
Your BMI is Body mass index is 59.15 kg/m .  Weight management is a personal decision.  If you are interested in exploring weight loss strategies, the following discussion covers the approaches that may be successful. Body mass index (BMI) is one way to tell whether you are at a healthy weight, overweight, or obese. It measures your weight in relation to your height.  A BMI of 18.5 to 24.9 is in the healthy range. A person with a BMI of 25 to 29.9 is considered overweight, and someone with a BMI of 30 or greater is considered obese. More than two-thirds of American adults are considered overweight or obese.  Being overweight or obese increases the risk for further weight gain. Excess weight may lead to heart disease and diabetes.  Creating and following plans for healthy eating and physical activity may help you improve your health.  Weight control is part of healthy lifestyle and includes exercise, emotional health, and healthy eating habits. Careful eating habits lifelong are the mainstay of weight control. Though there are significant health benefits from weight loss, long-term weight loss with diet alone may be very difficult to achieve- studies show long-term success with dietary management in less than 10% of people. Attaining a healthy weight may be especially difficult to achieve in those with severe obesity. In some cases, medications, devices and surgical management might be considered.  What can you do?  If you are overweight or obese and are interested in methods for weight loss, you should discuss this with your provider.     Consider reducing daily calorie intake by 500 calories.     Keep a food journal.     Avoiding skipping meals, consider cutting portions instead.    Diet combined with exercise helps maintain muscle while optimizing fat loss. Strength training is particularly important for building and maintaining muscle mass. Exercise helps reduce stress, increase energy, and improves fitness.  Increasing exercise without diet control, however, may not burn enough calories to loose weight.       Start walking three days a week 10-20 minutes at a time    Work towards walking thirty minutes five days a week     Eventually, increase the speed of your walking for 1-2 minutes at time    In addition, we recommend that you review healthy lifestyles and methods for weight loss available through the National Institutes of Health patient information sites:  http://win.niddk.nih.gov/publications/index.htm    And look into health and wellness programs that may be available through your health insurance provider, employer, local community center, or [employer].    Weight management plan: Patient was referred to their PCP to discuss a diet and exercise plan.

## 2019-10-24 ENCOUNTER — HOSPITAL ENCOUNTER (OUTPATIENT)
Dept: PHYSICAL THERAPY | Facility: CLINIC | Age: 59
Setting detail: THERAPIES SERIES
End: 2019-10-24
Attending: PHYSICAL MEDICINE & REHABILITATION
Payer: COMMERCIAL

## 2019-10-24 PROCEDURE — 97110 THERAPEUTIC EXERCISES: CPT | Mod: GP | Performed by: PHYSICAL THERAPIST

## 2019-10-24 PROCEDURE — 97116 GAIT TRAINING THERAPY: CPT | Mod: GP | Performed by: PHYSICAL THERAPIST

## 2019-10-29 DIAGNOSIS — I26.99 PULMONARY EMBOLISM, UNSPECIFIED CHRONICITY, UNSPECIFIED PULMONARY EMBOLISM TYPE, UNSPECIFIED WHETHER ACUTE COR PULMONALE PRESENT (H): ICD-10-CM

## 2019-10-29 LAB — INR PPP: 1.57 (ref 0.86–1.14)

## 2019-10-29 PROCEDURE — 36415 COLL VENOUS BLD VENIPUNCTURE: CPT | Performed by: FAMILY MEDICINE

## 2019-10-29 PROCEDURE — 85610 PROTHROMBIN TIME: CPT | Performed by: FAMILY MEDICINE

## 2019-10-30 ENCOUNTER — ANTICOAGULATION THERAPY VISIT (OUTPATIENT)
Dept: ANTICOAGULATION | Facility: CLINIC | Age: 59
End: 2019-10-30

## 2019-10-30 DIAGNOSIS — I26.99 PULMONARY EMBOLISM, UNSPECIFIED CHRONICITY, UNSPECIFIED PULMONARY EMBOLISM TYPE, UNSPECIFIED WHETHER ACUTE COR PULMONALE PRESENT (H): ICD-10-CM

## 2019-10-30 NOTE — PROGRESS NOTES
Addendum 10/30/19 Patient called to confirm that she did NOT miss any warfarin dosing. [facility]              ANTICOAGULATION FOLLOW-UP CLINIC VISIT    Patient Name:  Shira Rodriguez  Date:  10/30/2019  Contact Type:  Telephone    SUBJECTIVE:  Patient Findings     Comments:   Left message for patient to call the [facility] with updates on missed doses or explanation for subtherapeutic INR result.  Requested an INR on Friday.        Clinical Outcomes     Comments:   Left message for patient to call the [facility] with updates on missed doses or explanation for subtherapeutic INR result.  Requested an INR on Friday.           OBJECTIVE    INR   Date Value Ref Range Status   10/29/2019 1.57 (H) 0.86 - 1.14 Final       ASSESSMENT / PLAN  INR assessment SUB    Recheck INR In: 2 DAYS    INR Location Clinic      Anticoagulation Summary  As of 10/30/2019    INR goal:   2.0-3.0   TTR:   42.2 % (1.1 wk)   INR used for dosin.57! (10/29/2019)   Warfarin maintenance plan:   5 mg (5 mg x 1) every Mon, Wed, Fri; 7.5 mg (5 mg x 1.5) all other days   Full warfarin instructions:   10/30: 7.5 mg; Otherwise 5 mg every Mon, Wed, Fri; 7.5 mg all other days   Weekly warfarin total:   45 mg   Plan last modified:   Rich Noe RN (10/23/2019)   Next INR check:   2019   Target end date:       Indications    Pulmonary emboli (H) [I26.99]             Anticoagulation Episode Summary     INR check location:       Preferred lab:       Send INR reminders to:   [facility]    Comments:   HIPPA Forms mailed 10/11/19      Anticoagulation Care Providers     Provider Role Specialty Phone number    Anjel Busby MD [facility] [phone]            See the Encounter Report to view Anticoagulation Flowsheet and Dosing Calendar (Go to Encounters tab in chart review, and find the Anticoagulation Therapy Visit)    INR/CFX/F2 RESULT: INR result is 1.57     ASSESSMENT: Left message for patient with results and dosing  recommendations. Asked patient to call back to report any missed doses, falls, signs and symptoms of bleeding or clotting, any changes in health, medication, or diet. Asked patient to call back with any questions or concerns.    DOSING ADJUSTMENT: Recommended 7.5mg today and tomorrow, test on Friday.    NEXT INR/FACTOR X OR FACTOR II:11/1    PROTOCOL FOLLOWED:goal 2-3    Rich Noe RN

## 2019-10-31 ENCOUNTER — HOSPITAL ENCOUNTER (OUTPATIENT)
Dept: PHYSICAL THERAPY | Facility: CLINIC | Age: 59
Setting detail: THERAPIES SERIES
End: 2019-10-31
Attending: PHYSICAL MEDICINE & REHABILITATION
Payer: COMMERCIAL

## 2019-10-31 PROCEDURE — 97110 THERAPEUTIC EXERCISES: CPT | Mod: GP | Performed by: PHYSICAL THERAPIST

## 2019-11-01 DIAGNOSIS — I26.99 PULMONARY EMBOLISM, UNSPECIFIED CHRONICITY, UNSPECIFIED PULMONARY EMBOLISM TYPE, UNSPECIFIED WHETHER ACUTE COR PULMONALE PRESENT (H): ICD-10-CM

## 2019-11-01 LAB — INR PPP: 1.67 (ref 0.86–1.14)

## 2019-11-01 PROCEDURE — 36415 COLL VENOUS BLD VENIPUNCTURE: CPT | Performed by: FAMILY MEDICINE

## 2019-11-01 PROCEDURE — 85610 PROTHROMBIN TIME: CPT | Performed by: FAMILY MEDICINE

## 2019-11-03 ENCOUNTER — HEALTH MAINTENANCE LETTER (OUTPATIENT)
Age: 59
End: 2019-11-03

## 2019-11-04 ENCOUNTER — ANTICOAGULATION THERAPY VISIT (OUTPATIENT)
Dept: ANTICOAGULATION | Facility: CLINIC | Age: 59
End: 2019-11-04

## 2019-11-04 DIAGNOSIS — I26.99 PULMONARY EMBOLISM, UNSPECIFIED CHRONICITY, UNSPECIFIED PULMONARY EMBOLISM TYPE, UNSPECIFIED WHETHER ACUTE COR PULMONALE PRESENT (H): ICD-10-CM

## 2019-11-04 NOTE — PROGRESS NOTES
ANTICOAGULATION FOLLOW-UP CLINIC VISIT    Patient Name:  Shira Rodriguez  Date:  2019  Contact Type:  Telephone    SUBJECTIVE:         OBJECTIVE    INR   Date Value Ref Range Status   2019 1.67 (H) 0.86 - 1.14 Final       ASSESSMENT / PLAN  No question data found.  Anticoagulation Summary  As of 2019    INR goal:   2.0-3.0   TTR:   31.1 % (1.6 wk)   INR used for dosin.67! (2019)   Warfarin maintenance plan:   7.5 mg (5 mg x 1.5) every day   Full warfarin instructions:   7.5 mg every day   Weekly warfarin total:   52.5 mg   Plan last modified:   Mariah Sellers RN (2019)   Next INR check:   2019   Target end date:       Indications    Pulmonary emboli (H) [I26.99]             Anticoagulation Episode Summary     INR check location:       Preferred lab:       Send INR reminders to:   [facility]    Comments:   HIPPA Forms mailed 10/11/19      Anticoagulation Care Providers     Provider Role Specialty Phone number    Anjel Busby MD [facility] [phone]            See the Encounter Report to view Anticoagulation Flowsheet and Dosing Calendar (Go to Encounters tab in chart review, and find the Anticoagulation Therapy Visit)    Spoke with patient.     Mariah Sellers RN

## 2019-11-05 ENCOUNTER — TELEPHONE (OUTPATIENT)
Dept: CARDIOLOGY | Facility: CLINIC | Age: 59
End: 2019-11-05

## 2019-11-05 DIAGNOSIS — I45.5 SINUS PAUSE: Primary | ICD-10-CM

## 2019-11-07 ENCOUNTER — HOSPITAL ENCOUNTER (OUTPATIENT)
Dept: PHYSICAL THERAPY | Facility: CLINIC | Age: 59
Setting detail: THERAPIES SERIES
End: 2019-11-07
Attending: PHYSICAL MEDICINE & REHABILITATION
Payer: COMMERCIAL

## 2019-11-07 ENCOUNTER — OFFICE VISIT (OUTPATIENT)
Dept: WOUND CARE | Facility: CLINIC | Age: 59
End: 2019-11-07
Payer: COMMERCIAL

## 2019-11-07 DIAGNOSIS — S31.109D WOUND OF LEFT GROIN, SUBSEQUENT ENCOUNTER: Primary | ICD-10-CM

## 2019-11-07 PROCEDURE — 97112 NEUROMUSCULAR REEDUCATION: CPT | Mod: GP | Performed by: PHYSICAL THERAPIST

## 2019-11-07 PROCEDURE — 97116 GAIT TRAINING THERAPY: CPT | Mod: GP | Performed by: PHYSICAL THERAPIST

## 2019-11-07 PROCEDURE — 97110 THERAPEUTIC EXERCISES: CPT | Mod: GP | Performed by: PHYSICAL THERAPIST

## 2019-11-07 NOTE — PROGRESS NOTES
Patient comes to wound clinic for wound assessment per request of dr. Cardona. She has history of a Open surgical wound due to ECMO : DISCHARGE DIAGNOSIS Pulmonary EmbolismPEA ArrestBilateral pleural effusionsVolume overloadSinus pauseHTN Sleep apnea  Left groin wound Pannus wound Clean gloves are donned and current dressing removed. Previous treatment includes: Polymem    First date of treatment: 10/18/19    Objective findings:    Location: left  groin      Open Post-operative wound: Yes    Wound measured.: 3 cm x 1.5 cm x 0.2 cm , Stage: Stage 3: Full thickness skin loss with subcutaneous involvement     Wound description: no sign of infection    Tenderness:mildly    Odor: none    Drainage is moderate, serosanguinous     Tunneling:  N/A      Undermining: N/A    Wound Base: granulation and slough    compared to 10/07               Subjective finding:     Pt states: doing well    Patient is assessed for discomfort which is: minimal    Today's status of the wound: initial assessment and healing    Treatment: Removal of existing dressing, Visual inspection, Cleansing with Vashe solution, Wound packing with polymem, Application of clean dressing  Non-excisional debridement (no cutting was done), however removal of debris was performed with swabs, gauze and/or wet to dry dressings     Pt received the following instructions:  Continue with current cares  Cleansing with Vashe pack , soak for 5 minutes. Primary Dressing Polymem. Secondary Dressing: gauze and tape  Secure with: tape. Frequency: daily.   Signs and symptoms of infection taught.  Patient needs to be seen 2 weeks. Treated and follow-up appointment made. Annette Smart was available for supervision of care if needed or if questions should arise and regarding plan of care.  Margot Dye RN, CWON

## 2019-11-08 DIAGNOSIS — I26.99 PULMONARY EMBOLISM, UNSPECIFIED CHRONICITY, UNSPECIFIED PULMONARY EMBOLISM TYPE, UNSPECIFIED WHETHER ACUTE COR PULMONALE PRESENT (H): ICD-10-CM

## 2019-11-08 LAB — INR PPP: 2.1 (ref 0.86–1.14)

## 2019-11-08 PROCEDURE — 36415 COLL VENOUS BLD VENIPUNCTURE: CPT | Performed by: FAMILY MEDICINE

## 2019-11-08 PROCEDURE — 85610 PROTHROMBIN TIME: CPT | Performed by: FAMILY MEDICINE

## 2019-11-08 ASSESSMENT — ENCOUNTER SYMPTOMS
CHILLS: 0
INCREASED ENERGY: 0
WHEEZING: 0
COUGH: 1
NERVOUS/ANXIOUS: 0
NIGHT SWEATS: 0
JOINT SWELLING: 0
PALPITATIONS: 0
HEMOPTYSIS: 0
HYPOTENSION: 0
MUSCLE WEAKNESS: 1
POSTURAL DYSPNEA: 1
LIGHT-HEADEDNESS: 0
MYALGIAS: 0
ALTERED TEMPERATURE REGULATION: 0
DECREASED APPETITE: 0
HALLUCINATIONS: 0
PANIC: 0
ORTHOPNEA: 1
SNORES LOUDLY: 0
FEVER: 0
LEG PAIN: 0
ARTHRALGIAS: 1
SHORTNESS OF BREATH: 0
DYSPNEA ON EXERTION: 0
WEIGHT GAIN: 0
SYNCOPE: 0
POLYPHAGIA: 0
BACK PAIN: 1
STIFFNESS: 1
COUGH DISTURBING SLEEP: 0
WEIGHT LOSS: 1
NECK PAIN: 0
POLYDIPSIA: 0
SPUTUM PRODUCTION: 1
DECREASED CONCENTRATION: 0
HYPERTENSION: 1
DEPRESSION: 1
SLEEP DISTURBANCES DUE TO BREATHING: 0
MUSCLE CRAMPS: 0
EXERCISE INTOLERANCE: 0
INSOMNIA: 1
FATIGUE: 1

## 2019-11-11 ENCOUNTER — ANTICOAGULATION THERAPY VISIT (OUTPATIENT)
Dept: ANTICOAGULATION | Facility: CLINIC | Age: 59
End: 2019-11-11

## 2019-11-11 DIAGNOSIS — I26.99 PULMONARY EMBOLI (H): ICD-10-CM

## 2019-11-11 NOTE — PROGRESS NOTES
ANTICOAGULATION FOLLOW-UP CLINIC VISIT    Patient Name:  Shira Rodriguez  Date:  2019  Contact Type:  Telephone    SUBJECTIVE:  Patient Findings         Clinical Outcomes     Negatives:   Major bleeding event, Thromboembolic event, Anticoagulation-related hospital admission, Anticoagulation-related ED visit, Anticoagulation-related fatality           OBJECTIVE    INR   Date Value Ref Range Status   2019 2.10 (H) 0.86 - 1.14 Final       ASSESSMENT / PLAN  INR assessment THER    Recheck INR In: 6 DAYS    INR Location Clinic      Anticoagulation Summary  As of 2019    INR goal:   2.0-3.0   TTR:   28.2 % (2.6 wk)   INR used for dosin.10 (2019)   Warfarin maintenance plan:   7.5 mg (5 mg x 1.5) every day   Full warfarin instructions:   7.5 mg every day   Weekly warfarin total:   52.5 mg   Plan last modified:   Mariah Sellers RN (2019)   Next INR check:   2019   Target end date:       Indications    Pulmonary emboli (H) [I26.99]             Anticoagulation Episode Summary     INR check location:       Preferred lab:       Send INR reminders to:   [facility]    Comments:   HIPPA Forms mailed 10/11/19      Anticoagulation Care Providers     Provider Role Specialty Phone number    JakelaurenAnjel MD [facility] [phone]            See the Encounter Report to view Anticoagulation Flowsheet and Dosing Calendar (Go to Encounters tab in chart review, and find the Anticoagulation Therapy Visit)    Spoke with patient. Gave them their lab results and new warfarin recommendation.  No changes in health, medication, or diet. No missed doses, no falls. No signs or symptoms of bleed or clotting.    Heraclio Saldana [facility]

## 2019-11-12 ENCOUNTER — HOSPITAL ENCOUNTER (OUTPATIENT)
Dept: PHYSICAL THERAPY | Facility: CLINIC | Age: 59
Setting detail: THERAPIES SERIES
End: 2019-11-12
Attending: PHYSICAL MEDICINE & REHABILITATION
Payer: COMMERCIAL

## 2019-11-12 PROCEDURE — 97110 THERAPEUTIC EXERCISES: CPT | Mod: GP | Performed by: PHYSICAL THERAPIST

## 2019-11-12 PROCEDURE — 97112 NEUROMUSCULAR REEDUCATION: CPT | Mod: GP | Performed by: PHYSICAL THERAPIST

## 2019-11-13 ENCOUNTER — OFFICE VISIT (OUTPATIENT)
Dept: CARDIOLOGY | Facility: CLINIC | Age: 59
End: 2019-11-13
Attending: INTERNAL MEDICINE
Payer: COMMERCIAL

## 2019-11-13 VITALS
DIASTOLIC BLOOD PRESSURE: 93 MMHG | HEART RATE: 103 BPM | BODY MASS INDEX: 47.09 KG/M2 | SYSTOLIC BLOOD PRESSURE: 145 MMHG | HEIGHT: 66 IN | WEIGHT: 293 LBS | OXYGEN SATURATION: 95 %

## 2019-11-13 DIAGNOSIS — I47.10 PAROXYSMAL SUPRAVENTRICULAR TACHYCARDIA (H): Primary | ICD-10-CM

## 2019-11-13 PROCEDURE — G0463 HOSPITAL OUTPT CLINIC VISIT: HCPCS | Mod: 25,ZF

## 2019-11-13 PROCEDURE — 93005 ELECTROCARDIOGRAM TRACING: CPT | Mod: ZF

## 2019-11-13 PROCEDURE — 93010 ELECTROCARDIOGRAM REPORT: CPT | Mod: ZP | Performed by: INTERNAL MEDICINE

## 2019-11-13 PROCEDURE — 99213 OFFICE O/P EST LOW 20 MIN: CPT | Mod: ZP | Performed by: INTERNAL MEDICINE

## 2019-11-13 ASSESSMENT — PAIN SCALES - GENERAL: PAINLEVEL: NO PAIN (0)

## 2019-11-13 ASSESSMENT — MIFFLIN-ST. JEOR: SCORE: 2306.81

## 2019-11-13 NOTE — LETTER
11/13/2019      RE: Shira Rodriguez  [id number]  [place] [address]       Dear Colleague,    Thank you for the opportunity to participate in the care of your patient, Shira Rodriguez, at the [facility]. Please see a copy of my visit note below.    Electrophysiology Clinic Note  HPI:     Ms. Rodriguez is a 59 year old Female with PMHx of PEA arrest 2/2 to massive PE, acute hypoxic respiratory failuire, RV failure s/p VV ECMO cannulation 9/9 with decannulation on 9/19 and sinus pause who presents today for eval of her sinus pause.    Ms. Rodriguez was evaluated for a sinus pause that occurred during a sleep study on 4/2019. She was referred to Dr. Mir who determined that this was likely related to sleep related vagal overactivity and recommend that she optimize CYRUS via sleep medicine.     Ms. Rodriguez was recently admitted to [facility] on 9/09/2019 for PEA arrest. She was had presented to for SOB and cyanosis that reguired intubation and transfer to [facility] for catheter guided thrombolysis and placement of VV ECMO. Was cannulated with VV ECMO on 9/9 and underwent thrombectomy via IR on the same data. She was decannulated on 9/19. Her course was additionally complicated by left sided pleural effusion and necrotic LLL. Additionally, she underwent treatment for her RV failure with aggressive diuresis.    During her hospital stay she developed afib/aflutter on telemetry that was thought to be due to dobtamine. She was started on amio bolus and then started on metop. On the evening on 9/29 developed a 3.6 second sinus pause. EP was consulted and recommended no acute intervention.    She presents today for follow up of her sinus pause. She denies lightheadedness, dizziness, Sob, chest pain, PND and orthopnea. She is able to climb several flights of stairs without incidence.     Zio patch revealed 3 second pause along with occasionally symptomatic  "PACs.         PAST MEDICAL HISTORY:  Past Medical History:   Diagnosis Date     Chronic bronchitis (H) 07/30/2015     Contact dermatitis      Depressive disorder 1985     Eczema     HANDS     Elevated liver enzymes      H/O total knee replacement, left      History of total hip replacement 10/27/2011     Hyperlipidemia      Hypertension      Morbid obesity (H)      Osteoarthrosis     right knee     Sleep apnea      Tobacco use disorder        CURRENT MEDICATIONS:  Current Outpatient Medications   Medication Sig Dispense Refill     acetaminophen (TYLENOL) 325 MG tablet Take 2 tablets (650 mg) by mouth every 6 hours as needed for mild pain or fever       ARIPiprazole (ABILIFY) 5 MG tablet Take 1 tablet (5 mg) by mouth daily 30 tablet 0     atorvastatin (LIPITOR) 40 MG tablet Take 1 tablet (40 mg) by mouth every evening 30 tablet 0     DULoxetine (CYMBALTA) 60 MG capsule Take 1 capsule (60 mg) by mouth daily 30 capsule 0     folic acid (FOLVITE) 1 MG tablet Take 4 tablets (4 mg) by mouth daily Take 3-4 tablets daily 120 tablet 0     furosemide (LASIX) 80 MG tablet Take 1 tablet (80 mg) by mouth daily 30 tablet 0     insulin syringe-needle U-100 (BD INSULIN SYRINGE ULTRAFINE) 30G X 1/2\" 1 ML miscellaneous For methotrexate use weekly 8 each prn     leucovorin (WELLCOVORIN) 5 MG tablet Take 1 tablet (5 mg) by mouth every 7 days *take 24h after taking weekly methotrexate dose 4 tablet 3     lisinopril (PRINIVIL/ZESTRIL) 5 MG tablet Take 1 tablet (5 mg) by mouth daily 30 tablet 0     methotrexate 50 MG/2ML injection Inject 1 mL (25 mg) Subcutaneous every 7 days 4 mL 3     multivitamin w/minerals (THERA-VIT-M) tablet 1 tablet by Oral or Feeding Tube route daily 30 tablet 0     nystatin (MYCOSTATIN) 451274 UNIT/GM external powder Apply to coat rash tid, neck and breast area rashes 60 g 3     potassium chloride ER (K-DUR/KLOR-CON M) 20 MEQ CR tablet Take 1 tablet (20 mEq) by mouth daily 30 tablet 0     ranitidine (ZANTAC) 150 " MG tablet Take 1 tablet (150 mg) by mouth 2 times daily 60 tablet 0     vitamin B1 (THIAMINE) 100 MG tablet 1 tablet (100 mg) by Oral or Feeding Tube route daily 30 tablet 0     warfarin ANTICOAGULANT (COUMADIN) 5 MG tablet Take 7.5 mg (1 and 1/2 tablets) by mouth on Monday, Wednesday and Friday Evenings.  Take 5 mg (1 tablet) by mouth all other days. (Patient taking differently: Take 5 mg (1 and 1/2 tablets) by mouth on Monday, Wednesday and Friday Evenings.  Take 7.5 mg (1 tablet) by mouth all other days.) 50 tablet 0     zolpidem ER (AMBIEN CR) 6.25 MG CR tablet Take 1 tablet (6.25 mg) by mouth nightly as needed for sleep (Patient not taking: Reported on 10/23/2019) 1 tablet 0       PAST SURGICAL HISTORY:  Past Surgical History:   Procedure Laterality Date     ARTHROPLASTY KNEE  6/14/2012    Procedure: ARTHROPLASTY KNEE;  Left Total Knee Arthroplasty;  Surgeon: Annette Quesada MD;  [facility]     ARTHROSCOPY HIP Right      BRONCHOSCOPY FLEXIBLE AND RIGID N/A 9/17/2019    Procedure: Bronchoscopy flexible;  Surgeon: Patrick Rayo MD;  [facility]      TOTAL HIP ARTHROPLASTY  07/09/04    RT     CV EXTRACORPERAL MEMBRANE OXYGENATION N/A 9/9/2019    Procedure: Extracorporeal Membrance Oxygenation;  Surgeon: Kaleb Mcfarlane MD;  Location:  HEART CARDIAC CATH LAB     INSERT EXTRACORPORAL MEMBRANE OXYGENATOR N/A 9/17/2019    Procedure: Venous-Venous cannulation using ultrasound guidance and transesophageal echocardiogram, venous-arterial ecmo decannulation and repair of left femoral artery;  Surgeon: Patrick Rayo MD;  [facility]     IR [facility] THROMB PRIM ART, NON-INTRACRNL  9/10/2019     IR PULMONARY ANGIOGRAM BILATERAL  9/10/2019     IR THROMBOLYSIS ARTERIAL INFUSION INITIAL DAY  9/10/2019       ALLERGIES:     Allergies   Allergen Reactions     Adhesive Tape Blisters     Erythromycin Rash       FAMILY HISTORY:  Family History   Problem Relation Age of Onset     Thyroid Disease  "Mother      Hypertension Mother      Skin Cancer Mother         MMohs surgery with skin graft     Cerebrovascular Disease Mother         CVA after endarderectomy     Diabetes Mother      Breast Cancer Paternal Grandmother      Pancreatic Cancer Paternal Grandmother      Cardiovascular Paternal Aunt      Cardiovascular Paternal Uncle      Depression Other      Alcoholism Father      Thyroid Disease Sister      Alcoholism Sister      Hypertension Maternal Grandmother      Heart Disease Sister         multiple ablations     Alcoholism Sister      Prostate Cancer Paternal Grandfather        SOCIAL HISTORY:  Social History     Tobacco Use     Smoking status: Former Smoker     Packs/day: 1.00     Years: 33.00     Pack years: 33.00     Types: Cigarettes     Start date: 1982     Last attempt to quit: 2019     Years since quittin.1     Smokeless tobacco: Never Used     Tobacco comment: maybe   Substance Use Topics     Alcohol use: Yes     Alcohol/week: 0.0 standard drinks     Comment: occassional     Drug use: No       ROS:   A comprehensive 10 point review of systems negative other than as mentioned in HPI.  Exam:  BP (!) 145/93 (BP Location: Right arm, Patient Position: Chair, Cuff Size: Adult Regular)   Pulse 103   Ht 1.676 m (5' 6\")   Wt (!) 171 kg (377 lb)   SpO2 95%   BMI 60.85 kg/m     GENERAL APPEARANCE: alert and no distress  HEENT: no icterus, no xanthelasmas, normal pupil size and reaction, normal palate, mucosa moist, no central cyanosis  NECK: no adenopathy, no asymmetry, masses, or scars, thyroid normal to palpation and no bruits, JVP not elevated  RESPIRATORY: lungs clear to auscultation - no rales, rhonchi or wheezes, no use of accessory muscles, no retractions, respirations are unlabored, normal respiratory rate  CARDIOVASCULAR: regular rhythm, normal S1 with physiologic split S2, no S3 or S4 and no murmur, click or rub, precordium quiet with normal PMI.  ABDOMEN: soft, non tender, bowel " sounds normal, non-distended  EXTREMITIES: peripheral pulses normal, no edema  NEURO: alert and oriented to person/place/time, normal speech, gait and affect  SKIN: no ecchymoses, no rashes  PSYCH: normal affect, cooperative    Labs:  Reviewed.     Testing/Procedures:  PULMONARY FUNCTION TESTS:   PFT Latest Ref Rng & Units 6/11/2019   FVC L 2.29   FEV1 L 1.79   FVC% % 66   FEV1% % 65         ECHOCARDIOGRAM:   TTE 9/09/2019  Interpretation Summary  Technically difficult study.  Left ventricular function, chamber size, wall motion, and wall thickness are  normal.The EF is 55-60%.  Right ventricular function, chamber size, wall motion, and thickness are  normal.  No pericardial effusion is present.        Assessment and Plan:     Ms. Rodriguez is a 59 year old Female with PMHx of PEA arrest 2/2 to massive PE, acute hypoxic respiratory failuire, RV failure s/p VV ECMO cannulation 9/9 with decannulation on 9/19 and sinus pause who presents today for eval of her sinus pause.      #Sinus Pause    Patient presenting today with sinus pause. Was recently evaluated on 4/2019 for 8 second sinus pause that was likely related to CYRUS. Had a recent hospitlization for PEA arrest 2/2 to massive PE requiring VV Ecmo and Catheter guided thrombolysis. During hospitalization, had a 3.6 second pause on telemetry with no symptoms. Repeat Zio patch on 10/2019 revealed 3.0 second pause that occurred during sleep. It is likely that Ms. Rodriguez's sinus pause is related to her CYRUS.    Plan:  - Follow up with sleep medicine to optimize CYRUS  - No acute indication for PPM placement due to lack of symptoms and relation of sinus pauses to sleep    RTC PRN    Staffed with Dr. Hubert Grossman MD  Cardiology Fellow     EP STAFF NOTE  Patient seen and examined and management plan personally reviewed with the patient. I agree with the note above by the CV/EP fellow.    Marques Gaspar MD [facility]  Cardiology - Electrophysiology

## 2019-11-13 NOTE — PROGRESS NOTES
Electrophysiology Clinic Note  HPI:     Ms. Rodriguez is a 59 year old Female with PMHx of PEA arrest 2/2 to massive PE, acute hypoxic respiratory failuire, RV failure s/p VV ECMO cannulation 9/9 with decannulation on 9/19 and sinus pause who presents today for eval of her sinus pause.    Ms. Rodriguez was evaluated for a sinus pause that occurred during a sleep study on 4/2019. She was referred to Dr. Mir who determined that this was likely related to sleep related vagal overactivity and recommend that she optimize CYRUS via sleep medicine.     Ms. Rodriguez was recently admitted to [facility] on 9/09/2019 for PEA arrest. She was had presented to for SOB and cyanosis that reguired intubation and transfer to [facility] for catheter guided thrombolysis and placement of VV ECMO. Was cannulated with VV ECMO on 9/9 and underwent thrombectomy via IR on the same data. She was decannulated on 9/19. Her course was additionally complicated by left sided pleural effusion and necrotic LLL. Additionally, she underwent treatment for her RV failure with aggressive diuresis.    During her hospital stay she developed afib/aflutter on telemetry that was thought to be due to dobtamine. She was started on amio bolus and then started on metop. On the evening on 9/29 developed a 3.6 second sinus pause. EP was consulted and recommended no acute intervention.    She presents today for follow up of her sinus pause. She denies lightheadedness, dizziness, Sob, chest pain, PND and orthopnea. She is able to climb several flights of stairs without incidence.     Zio patch revealed 3 second pause along with occasionally symptomatic PACs.         PAST MEDICAL HISTORY:  Past Medical History:   Diagnosis Date     Chronic bronchitis (H) 07/30/2015     Contact dermatitis      Depressive disorder 1985     Eczema     HANDS     Elevated liver enzymes      H/O total knee replacement, left      History of total hip replacement 10/27/2011     Hyperlipidemia       "Hypertension      Morbid obesity (H)      Osteoarthrosis     right knee     Sleep apnea      Tobacco use disorder        CURRENT MEDICATIONS:  Current Outpatient Medications   Medication Sig Dispense Refill     acetaminophen (TYLENOL) 325 MG tablet Take 2 tablets (650 mg) by mouth every 6 hours as needed for mild pain or fever       ARIPiprazole (ABILIFY) 5 MG tablet Take 1 tablet (5 mg) by mouth daily 30 tablet 0     atorvastatin (LIPITOR) 40 MG tablet Take 1 tablet (40 mg) by mouth every evening 30 tablet 0     DULoxetine (CYMBALTA) 60 MG capsule Take 1 capsule (60 mg) by mouth daily 30 capsule 0     folic acid (FOLVITE) 1 MG tablet Take 4 tablets (4 mg) by mouth daily Take 3-4 tablets daily 120 tablet 0     furosemide (LASIX) 80 MG tablet Take 1 tablet (80 mg) by mouth daily 30 tablet 0     insulin syringe-needle U-100 (BD INSULIN SYRINGE ULTRAFINE) 30G X 1/2\" 1 ML miscellaneous For methotrexate use weekly 8 each prn     leucovorin (WELLCOVORIN) 5 MG tablet Take 1 tablet (5 mg) by mouth every 7 days *take 24h after taking weekly methotrexate dose 4 tablet 3     lisinopril (PRINIVIL/ZESTRIL) 5 MG tablet Take 1 tablet (5 mg) by mouth daily 30 tablet 0     methotrexate 50 MG/2ML injection Inject 1 mL (25 mg) Subcutaneous every 7 days 4 mL 3     multivitamin w/minerals (THERA-VIT-M) tablet 1 tablet by Oral or Feeding Tube route daily 30 tablet 0     nystatin (MYCOSTATIN) 363328 UNIT/GM external powder Apply to coat rash tid, neck and breast area rashes 60 g 3     potassium chloride ER (K-DUR/KLOR-CON M) 20 MEQ CR tablet Take 1 tablet (20 mEq) by mouth daily 30 tablet 0     ranitidine (ZANTAC) 150 MG tablet Take 1 tablet (150 mg) by mouth 2 times daily 60 tablet 0     vitamin B1 (THIAMINE) 100 MG tablet 1 tablet (100 mg) by Oral or Feeding Tube route daily 30 tablet 0     warfarin ANTICOAGULANT (COUMADIN) 5 MG tablet Take 7.5 mg (1 and 1/2 tablets) by mouth on Monday, Wednesday and Friday Evenings.  Take 5 mg (1 " tablet) by mouth all other days. (Patient taking differently: Take 5 mg (1 and 1/2 tablets) by mouth on Monday, Wednesday and Friday Evenings.  Take 7.5 mg (1 tablet) by mouth all other days.) 50 tablet 0     zolpidem ER (AMBIEN CR) 6.25 MG CR tablet Take 1 tablet (6.25 mg) by mouth nightly as needed for sleep (Patient not taking: Reported on 10/23/2019) 1 tablet 0       PAST SURGICAL HISTORY:  Past Surgical History:   Procedure Laterality Date     ARTHROPLASTY KNEE  6/14/2012    Procedure: ARTHROPLASTY KNEE;  Left Total Knee Arthroplasty;  Surgeon: Annette Quesada MD;  [facility]     ARTHROSCOPY HIP Right      BRONCHOSCOPY FLEXIBLE AND RIGID N/A 9/17/2019    Procedure: Bronchoscopy flexible;  Surgeon: Patrick Rayo MD;  [facility]     C TOTAL HIP ARTHROPLASTY  07/09/04    RT     CV EXTRACORPERAL MEMBRANE OXYGENATION N/A 9/9/2019    Procedure: Extracorporeal Membrance Oxygenation;  Surgeon: Kaleb Mcfarlane MD;  [facility]     INSERT EXTRACORPORAL MEMBRANE OXYGENATOR N/A 9/17/2019    Procedure: Venous-Venous cannulation using ultrasound guidance and transesophageal echocardiogram, venous-arterial ecmo decannulation and repair of left femoral artery;  Surgeon: Patrick Rayo MD;  [facility]     IR MECH THROMB PRIM ART, NON-INTRACRNL  9/10/2019     IR PULMONARY ANGIOGRAM BILATERAL  9/10/2019     IR THROMBOLYSIS ARTERIAL INFUSION INITIAL DAY  9/10/2019       ALLERGIES:     Allergies   Allergen Reactions     Adhesive Tape Blisters     Erythromycin Rash       FAMILY HISTORY:  Family History   Problem Relation Age of Onset     Thyroid Disease Mother      Hypertension Mother      Skin Cancer Mother         MMohs surgery with skin graft     Cerebrovascular Disease Mother         CVA after endarderectomy     Diabetes Mother      Breast Cancer Paternal Grandmother      Pancreatic Cancer Paternal Grandmother      Cardiovascular Paternal Aunt      Cardiovascular  "Paternal Uncle      Depression Other      Alcoholism Father      Thyroid Disease Sister      Alcoholism Sister      Hypertension Maternal Grandmother      Heart Disease Sister         multiple ablations     Alcoholism Sister      Prostate Cancer Paternal Grandfather        SOCIAL HISTORY:  Social History     Tobacco Use     Smoking status: Former Smoker     Packs/day: 1.00     Years: 33.00     Pack years: 33.00     Types: Cigarettes     Start date: 1982     Last attempt to quit: 2019     Years since quittin.1     Smokeless tobacco: Never Used     Tobacco comment: maybe   Substance Use Topics     Alcohol use: Yes     Alcohol/week: 0.0 standard drinks     Comment: occassional     Drug use: No       ROS:   A comprehensive 10 point review of systems negative other than as mentioned in HPI.  Exam:  BP (!) 145/93 (BP Location: Right arm, Patient Position: Chair, Cuff Size: Adult Regular)   Pulse 103   Ht 1.676 m (5' 6\")   Wt (!) 171 kg (377 lb)   SpO2 95%   BMI 60.85 kg/m    GENERAL APPEARANCE: alert and no distress  HEENT: no icterus, no xanthelasmas, normal pupil size and reaction, normal palate, mucosa moist, no central cyanosis  NECK: no adenopathy, no asymmetry, masses, or scars, thyroid normal to palpation and no bruits, JVP not elevated  RESPIRATORY: lungs clear to auscultation - no rales, rhonchi or wheezes, no use of accessory muscles, no retractions, respirations are unlabored, normal respiratory rate  CARDIOVASCULAR: regular rhythm, normal S1 with physiologic split S2, no S3 or S4 and no murmur, click or rub, precordium quiet with normal PMI.  ABDOMEN: soft, non tender, bowel sounds normal, non-distended  EXTREMITIES: peripheral pulses normal, no edema  NEURO: alert and oriented to person/place/time, normal speech, gait and affect  SKIN: no ecchymoses, no rashes  PSYCH: normal affect, cooperative    Labs:  Reviewed.     Testing/Procedures:  PULMONARY FUNCTION TESTS:   PFT Latest Ref Rng & Units " 6/11/2019   FVC L 2.29   FEV1 L 1.79   FVC% % 66   FEV1% % 65         ECHOCARDIOGRAM:   TTE 9/09/2019  Interpretation Summary  Technically difficult study.  Left ventricular function, chamber size, wall motion, and wall thickness are  normal.The EF is 55-60%.  Right ventricular function, chamber size, wall motion, and thickness are  normal.  No pericardial effusion is present.        Assessment and Plan:     Ms. Rodriguez is a 59 year old Female with PMHx of PEA arrest 2/2 to massive PE, acute hypoxic respiratory failuire, RV failure s/p VV ECMO cannulation 9/9 with decannulation on 9/19 and sinus pause who presents today for eval of her sinus pause.      #Sinus Pause    Patient presenting today with sinus pause. Was recently evaluated on 4/2019 for 8 second sinus pause that was likely related to CYRUS. Had a recent hospitlization for PEA arrest 2/2 to massive PE requiring VV Ecmo and Catheter guided thrombolysis. During hospitalization, had a 3.6 second pause on telemetry with no symptoms. Repeat Zio patch on 10/2019 revealed 3.0 second pause that occurred during sleep. It is likely that Ms. Rodriguez's sinus pause is related to her CYRUS.    Plan:  - Follow up with sleep medicine to optimize CYRUS  - No acute indication for PPM placement due to lack of symptoms and relation of sinus pauses to sleep    RTC PRN    Staffed with Dr. Hubert Grossman MD  Cardiology Fellow     EP STAFF NOTE  Patient seen and examined and management plan personally reviewed with the patient. I agree with the note above by the CV/EP fellow.  Marques Gaspar MD [facility]  Cardiology - Electrophysiology

## 2019-11-14 ENCOUNTER — HOSPITAL ENCOUNTER (OUTPATIENT)
Dept: PHYSICAL THERAPY | Facility: CLINIC | Age: 59
Setting detail: THERAPIES SERIES
End: 2019-11-14
Attending: PHYSICAL MEDICINE & REHABILITATION
Payer: COMMERCIAL

## 2019-11-14 DIAGNOSIS — I26.99 PULMONARY EMBOLISM, UNSPECIFIED CHRONICITY, UNSPECIFIED PULMONARY EMBOLISM TYPE, UNSPECIFIED WHETHER ACUTE COR PULMONALE PRESENT (H): ICD-10-CM

## 2019-11-14 LAB
INR PPP: 2.57 (ref 0.86–1.14)
INTERPRETATION ECG - MUSE: NORMAL

## 2019-11-14 PROCEDURE — 97112 NEUROMUSCULAR REEDUCATION: CPT | Mod: GP | Performed by: PHYSICAL THERAPIST

## 2019-11-14 PROCEDURE — 97110 THERAPEUTIC EXERCISES: CPT | Mod: GP | Performed by: PHYSICAL THERAPIST

## 2019-11-14 PROCEDURE — 85610 PROTHROMBIN TIME: CPT | Performed by: FAMILY MEDICINE

## 2019-11-14 PROCEDURE — 36415 COLL VENOUS BLD VENIPUNCTURE: CPT | Performed by: FAMILY MEDICINE

## 2019-11-15 ENCOUNTER — ANTICOAGULATION THERAPY VISIT (OUTPATIENT)
Dept: ANTICOAGULATION | Facility: CLINIC | Age: 59
End: 2019-11-15

## 2019-11-15 DIAGNOSIS — I26.99 PULMONARY EMBOLI (H): ICD-10-CM

## 2019-11-15 NOTE — PROGRESS NOTES
ANTICOAGULATION FOLLOW-UP CLINIC VISIT    Patient Name:  Shira Rodriguez  Date:  11/15/2019  Contact Type:  Telephone    SUBJECTIVE:         OBJECTIVE    INR   Date Value Ref Range Status   2019 2.57 (H) 0.86 - 1.14 Final       ASSESSMENT / PLAN  No question data found.  Anticoagulation Summary  As of 11/15/2019    INR goal:   2.0-3.0   TTR:   45.9 % (3.4 wk)   INR used for dosin.57 (2019)   Warfarin maintenance plan:   7.5 mg (5 mg x 1.5) every day   Full warfarin instructions:   7.5 mg every day   Weekly warfarin total:   52.5 mg   Plan last modified:   Mariah Sellers RN (11/15/2019)   Next INR check:   2019   Target end date:       Indications    Pulmonary emboli (H) [I26.99]             Anticoagulation Episode Summary     INR check location:       Preferred lab:       Send INR reminders to:   [facility]    Comments:   HIPPA Forms returned 19  OK to leave messages on Cell  [phone]      Anticoagulation Care Providers     Provider Role Specialty Phone number    Anjel Busby MD [facility] [phone]            See the Encounter Report to view Anticoagulation Flowsheet and Dosing Calendar (Go to Encounters tab in chart review, and find the Anticoagulation Therapy Visit)    Left message for patient with results and dosing recommendations. Asked patient to call back to report any missed doses, falls, signs and symptoms of bleeding or clotting, any changes in health, medication, or diet. Asked patient to call back with any questions or concerns.     Mariah Sellers RN

## 2019-11-16 DIAGNOSIS — I10 ESSENTIAL HYPERTENSION: ICD-10-CM

## 2019-11-18 RX ORDER — LISINOPRIL 10 MG/1
TABLET ORAL
Qty: 90 TABLET | Refills: 0 | OUTPATIENT
Start: 2019-11-18

## 2019-11-19 ENCOUNTER — HOSPITAL ENCOUNTER (OUTPATIENT)
Dept: PHYSICAL THERAPY | Facility: CLINIC | Age: 59
Setting detail: THERAPIES SERIES
End: 2019-11-19
Attending: PHYSICAL MEDICINE & REHABILITATION
Payer: COMMERCIAL

## 2019-11-19 PROCEDURE — 97112 NEUROMUSCULAR REEDUCATION: CPT | Mod: GP | Performed by: PHYSICAL THERAPIST

## 2019-11-19 PROCEDURE — 97110 THERAPEUTIC EXERCISES: CPT | Mod: GP | Performed by: PHYSICAL THERAPIST

## 2019-11-20 ENCOUNTER — ANTICOAGULATION THERAPY VISIT (OUTPATIENT)
Dept: ANTICOAGULATION | Facility: CLINIC | Age: 59
End: 2019-11-20

## 2019-11-20 ENCOUNTER — OFFICE VISIT (OUTPATIENT)
Dept: FAMILY MEDICINE | Facility: CLINIC | Age: 59
End: 2019-11-20
Payer: COMMERCIAL

## 2019-11-20 ENCOUNTER — OFFICE VISIT (OUTPATIENT)
Dept: WOUND CARE | Facility: CLINIC | Age: 59
End: 2019-11-20
Payer: COMMERCIAL

## 2019-11-20 VITALS
DIASTOLIC BLOOD PRESSURE: 81 MMHG | HEART RATE: 80 BPM | OXYGEN SATURATION: 93 % | SYSTOLIC BLOOD PRESSURE: 127 MMHG | BODY MASS INDEX: 61.66 KG/M2 | WEIGHT: 293 LBS

## 2019-11-20 DIAGNOSIS — E78.5 HYPERLIPIDEMIA LDL GOAL <100: ICD-10-CM

## 2019-11-20 DIAGNOSIS — I46.9 CARDIAC ARREST (H): ICD-10-CM

## 2019-11-20 DIAGNOSIS — I26.99 PULMONARY EMBOLI (H): ICD-10-CM

## 2019-11-20 DIAGNOSIS — F43.21 ADJUSTMENT DISORDER WITH DEPRESSED MOOD: Chronic | ICD-10-CM

## 2019-11-20 DIAGNOSIS — S31.109D WOUND OF LEFT GROIN, SUBSEQUENT ENCOUNTER: Primary | ICD-10-CM

## 2019-11-20 DIAGNOSIS — E87.70 HYPERVOLEMIA, UNSPECIFIED HYPERVOLEMIA TYPE: Primary | ICD-10-CM

## 2019-11-20 DIAGNOSIS — I26.99 PULMONARY EMBOLISM, UNSPECIFIED CHRONICITY, UNSPECIFIED PULMONARY EMBOLISM TYPE, UNSPECIFIED WHETHER ACUTE COR PULMONALE PRESENT (H): ICD-10-CM

## 2019-11-20 DIAGNOSIS — I26.09 PULMONARY EMBOLISM WITH ACUTE COR PULMONALE, UNSPECIFIED CHRONICITY, UNSPECIFIED PULMONARY EMBOLISM TYPE (H): ICD-10-CM

## 2019-11-20 DIAGNOSIS — Z79.899 HIGH RISK MEDICATION USE: ICD-10-CM

## 2019-11-20 DIAGNOSIS — E87.70 HYPERVOLEMIA, UNSPECIFIED HYPERVOLEMIA TYPE: ICD-10-CM

## 2019-11-20 DIAGNOSIS — M05.79 RHEUMATOID ARTHRITIS INVOLVING MULTIPLE SITES WITH POSITIVE RHEUMATOID FACTOR (H): ICD-10-CM

## 2019-11-20 LAB
ANION GAP SERPL CALCULATED.3IONS-SCNC: 7 MMOL/L (ref 3–14)
BUN SERPL-MCNC: 11 MG/DL (ref 7–30)
CALCIUM SERPL-MCNC: 9.7 MG/DL (ref 8.5–10.1)
CHLORIDE SERPL-SCNC: 107 MMOL/L (ref 94–109)
CO2 SERPL-SCNC: 26 MMOL/L (ref 20–32)
CREAT SERPL-MCNC: 0.64 MG/DL (ref 0.52–1.04)
GFR SERPL CREATININE-BSD FRML MDRD: >90 ML/MIN/{1.73_M2}
GLUCOSE SERPL-MCNC: 132 MG/DL (ref 70–99)
INR PPP: 2.35 (ref 0.86–1.14)
POTASSIUM SERPL-SCNC: 4 MMOL/L (ref 3.4–5.3)
SODIUM SERPL-SCNC: 140 MMOL/L (ref 133–144)

## 2019-11-20 RX ORDER — WARFARIN SODIUM 5 MG/1
TABLET ORAL
Qty: 50 TABLET | Refills: 0 | Status: SHIPPED | OUTPATIENT
Start: 2019-11-20 | End: 2019-12-12

## 2019-11-20 RX ORDER — LISINOPRIL 5 MG/1
5 TABLET ORAL DAILY
Qty: 90 TABLET | Refills: 3 | Status: SHIPPED | OUTPATIENT
Start: 2019-11-20 | End: 2020-12-15

## 2019-11-20 RX ORDER — POTASSIUM CHLORIDE 750 MG/1
10 CAPSULE, EXTENDED RELEASE ORAL 2 TIMES DAILY
Qty: 90 CAPSULE | Refills: 3 | Status: SHIPPED | OUTPATIENT
Start: 2019-11-20 | End: 2019-11-20

## 2019-11-20 RX ORDER — ARIPIPRAZOLE 5 MG/1
5 TABLET ORAL DAILY
Qty: 90 TABLET | Refills: 3 | Status: SHIPPED | OUTPATIENT
Start: 2019-11-20 | End: 2022-05-24

## 2019-11-20 RX ORDER — FUROSEMIDE 40 MG
40 TABLET ORAL DAILY
Qty: 90 TABLET | Refills: 3 | Status: SHIPPED | OUTPATIENT
Start: 2019-11-20 | End: 2019-11-25

## 2019-11-20 RX ORDER — ATORVASTATIN CALCIUM 40 MG/1
40 TABLET, FILM COATED ORAL EVERY EVENING
Qty: 90 TABLET | Refills: 3 | Status: SHIPPED | OUTPATIENT
Start: 2019-11-20 | End: 2020-12-23

## 2019-11-20 RX ORDER — POTASSIUM CHLORIDE 750 MG/1
10 CAPSULE, EXTENDED RELEASE ORAL 2 TIMES DAILY
Qty: 180 CAPSULE | Refills: 3 | Status: SHIPPED | OUTPATIENT
Start: 2019-11-20 | End: 2021-05-18

## 2019-11-20 ASSESSMENT — PAIN SCALES - GENERAL: PAINLEVEL: SEVERE PAIN (6)

## 2019-11-20 NOTE — PROGRESS NOTES
ANTICOAGULATION FOLLOW-UP CLINIC VISIT    Patient Name:  Shira Rodriguez  Date:  2019  Contact Type:  Telephone    SUBJECTIVE:         OBJECTIVE    INR   Date Value Ref Range Status   2019 2.35 (H) 0.86 - 1.14 Final       ASSESSMENT / PLAN  No question data found.  Anticoagulation Summary  As of 2019    INR goal:   2.0-3.0   TTR:   56.5 % (1 mo)   INR used for dosin.35 (2019)   Warfarin maintenance plan:   7.5 mg (5 mg x 1.5) every day   Full warfarin instructions:   7.5 mg every day   Weekly warfarin total:   52.5 mg   No change documented:   Mariah Sellers RN   Plan last modified:   Mariah Sellers RN (11/15/2019)   Next INR check:   2019   Target end date:       Indications    Pulmonary emboli (H) [I26.99]             Anticoagulation Episode Summary     INR check location:       Preferred lab:       Send INR reminders to:   [facility]    Comments:   HIPPA Forms returned 19  OK to leave messages on Cell  [phone]      Anticoagulation Care Providers     Provider Role Specialty Phone number    Anjel Busby MD [facility] [phone]            See the Encounter Report to view Anticoagulation Flowsheet and Dosing Calendar (Go to Encounters tab in chart review, and find the Anticoagulation Therapy Visit)    Left message for patient with results and dosing recommendations. Asked patient to call back to report any missed doses, falls, signs and symptoms of bleeding or clotting, any changes in health, medication, or diet. Asked patient to call back with any questions or concerns.     Mariah Sellers RN

## 2019-11-20 NOTE — PATIENT INSTRUCTIONS
[facility] Medication Refill Request Information:  * Please contact your pharmacy regarding ANY request for medication refills.  ** [facility] Prescription Fax = [phone]  * Please allow 3 business days for routine medication refills.  * Please allow 5 business days for controlled substance medication refills.     [facility] Test Result notification information:  *You will be notified with in 7-10 days of your appointment day regarding the results of your test.  If you are on MyChart you will be notified as soon as the provider has reviewed the results and signed off on them.    [facility]: [phone]

## 2019-11-20 NOTE — PROGRESS NOTES
[facility]  Primary Care Center   Anjel Busby MD  11/20/2019      Chief Complaint:   Recheck Medication       History of Present Illness:   Shira Rodriguez is a 59 year old female with a history of cardiac arrest, pulmonary embolism, obstructive sleep apnea, and chronic bronchitis who presents for a medication recheck.     General wellness: She does physical therapy for her back pain twice per week, and has graduated to using a cane instead of a walker. Her mammogram on 10/23 was normal.     Obstructive sleep apnea: She has another sleep study set up for December with Dr. Greenfield. He wants he to take Ambien, because she takes off her CPAP in her sleep. Because of her PE and PEA arrest, Dr. Greenfield thought it would be safer to do the Ambien trial during a sleep study, so it could be monitored.      Medications: Her Lasix ran out at the end of last week, so she has been off of it for a few days. Since stopping, she has noticed that her socks are leaving bigger dents in her legs after she takes them off. She has shortness of breath with exertion, which is normal for her, but she has since developed a cough as well. Her potassium has also run out. She has resumed Methotrexate 25 mg weekly as of 10/6. She was started on Zantac twice daily while in the hospital, but has only been taking it once daily since discharge and does not think she needs it at all.    Other concerns discussed:  1. Her open surgical wound due to ECMO is improving. She sees wound care today.      Review of Systems:   Pertinent items are noted in HPI or as in patient entered ROS below, remainder of complete ROS is negative.     Active Medications:     Current Outpatient Medications:      acetaminophen (TYLENOL) 325 MG tablet, Take 2 tablets (650 mg) by mouth every 6 hours as needed for mild pain or fever, Disp: , Rfl:      ARIPiprazole (ABILIFY) 5 MG tablet, Take 1 tablet (5 mg) by mouth daily, Disp: 90 tablet, Rfl: 3     atorvastatin (LIPITOR) 40  "MG tablet, Take 1 tablet (40 mg) by mouth every evening, Disp: 90 tablet, Rfl: 3     DULoxetine (CYMBALTA) 60 MG capsule, Take 1 capsule (60 mg) by mouth daily, Disp: 30 capsule, Rfl: 0     folic acid (FOLVITE) 1 MG tablet, Take 4 tablets (4 mg) by mouth daily Take 3-4 tablets daily, Disp: 120 tablet, Rfl: 0     furosemide (LASIX) 40 MG tablet, Take 1 tablet (40 mg) by mouth daily, Disp: 90 tablet, Rfl: 3     insulin syringe-needle U-100 (BD INSULIN SYRINGE ULTRAFINE) 30G X 1/2\" 1 ML miscellaneous, For methotrexate use weekly, Disp: 8 each, Rfl: prn     leucovorin (WELLCOVORIN) 5 MG tablet, Take 1 tablet (5 mg) by mouth every 7 days *take 24h after taking weekly methotrexate dose, Disp: 4 tablet, Rfl: 3     lisinopril (PRINIVIL/ZESTRIL) 5 MG tablet, Take 1 tablet (5 mg) by mouth daily, Disp: 90 tablet, Rfl: 3     methotrexate 50 MG/2ML injection, Inject 1 mL (25 mg) Subcutaneous every 7 days, Disp: 4 mL, Rfl: 3     nystatin (MYCOSTATIN) 115659 UNIT/GM external powder, Apply to coat rash tid, neck and breast area rashes, Disp: 60 g, Rfl: 3     potassium chloride ER (MICRO-K) 10 MEQ CR capsule, Take 1 capsule (10 mEq) by mouth 2 times daily, Disp: 90 capsule, Rfl: 3     warfarin ANTICOAGULANT (COUMADIN) 5 MG tablet, Take 7.5 mg (1 and 1/2 tablets) by mouth on Monday, Wednesday and Friday Evenings.  Take 5 mg (1 tablet) by mouth all other days., Disp: 50 tablet, Rfl: 0     zolpidem ER (AMBIEN CR) 6.25 MG CR tablet, Take 1 tablet (6.25 mg) by mouth nightly as needed for sleep, Disp: 1 tablet, Rfl: 0      Allergies:   Adhesive tape and Erythromycin      Past Medical History:  Chronic bronchitis  Contact dermatitis  Depressive disorder  Eczema   Elevated liver enzymes  Hyperlipidemia  Hypertension  Morbid obesity  Primary localized osteoarthritis, pelvic region and thigh   Obstructive sleep apnea  Tobacco use disorder  Degeneration of cervical intervertebral disc  Rheumatoid arthritis  Cardiac arrest  Pulmonary " emboli  Arthritis of knee, degenerative     Past Surgical History:  Left total knee arthroplasty: 6/2012  Arthroscopy hip, right: 7/2004  Bronchoscopy flexible and rigid: 9/17/2019  Extracorporeal membrane oxygenation: 9/9/2019  Insert extracorporeal membrane oxygenator: 9/17/2019  [facility] thromb prim artery, non-intracranial: 9/10/19  Pulmonary angiogram bilateral: 9/10/19  Thrombolysis arterial infusion initial day: 9/10/2019    Family History:   Thyroid disease: mother, sister  Hypertension: mother, maternal grandmother  Skin cancer: mother  Cerebrovascular disease: mother  Diabetes: mother   Pancreatic cancer: paternal grandmother   Cardiovascular: paternal aunt, paternal uncle  Alcoholism: father, sister  Heart disease: sister   Prostate cancer: maternal grandfather       Social History:   Smoking status: former smoker, 33 pack years, quit 9/9/2019  Alcohol use: occasional      Physical Exam:   /81 (BP Location: Right arm, Patient Position: Sitting, Cuff Size: Adult Large)   Pulse 80   Wt (!) 173.3 kg (382 lb)   SpO2 93%   BMI 61.66 kg/m     Constitutional: Alert. In no distress.  Head: Normocephalic. No masses, lesions, tenderness or abnormalities.  Cardiovascular: RRR. No murmurs, clicks, gallops, or rub.  Respiratory: Clear to auscultation bilaterally, no wheezes or crackles.  Musculoskeletal: Extremities normal. No gross deformities noted. Normal muscle tone.  Neurologic: Gait normal. Reflexes normal and symmetric. Sensation grossly WNL.  Psychiatric: Mentation appears normal. Normal affect.      Assessment and Plan:  Adjustment disorder with depressed mood  Doing well. Will review over time if we can cut this dose down. Currently she is a month or so out from a major health event.  - ARIPiprazole (ABILIFY) 5 MG tablet  Dispense: 90 tablet; Refill: 3    Hyperlipidemia LDL goal <100  - atorvastatin (LIPITOR) 40 MG tablet  Dispense: 90 tablet; Refill: 3    Hypervolemia, unspecified hypervolemia  type  She stopped her 80 mg dose of Lasix last week. Since then, she has noticed a little more leg swelling and a little cough. I will resume Lasix and potassium at half dose. Will check kidney labs today, and she sees cardiology in 2 weeks.  - furosemide (LASIX) 40 MG tablet  Dispense: 90 tablet; Refill: 3  - potassium chloride ER (MICRO-K) 10 MEQ CR capsule  Dispense: 90 capsule; Refill: 3  - Basic metabolic panel    Pulmonary embolism with acute cor pulmonale, unspecified chronicity, unspecified pulmonary embolism type (H)  She is working with the Coumadin clinic and is following up with cardiology soon.  - warfarin ANTICOAGULANT (COUMADIN) 5 MG tablet  Dispense: 50 tablet; Refill: 0    Cardiac arrest (H)  She will continue the current dose and we will watch her blood pressure.   - lisinopril (PRINIVIL/ZESTRIL) 5 MG tablet  Dispense: 90 tablet; Refill: 3     Follow-up: Return in about 1 month (around 12/20/2019).         Scribe Disclosure:  Marybel MAHARAJ, am serving as a scribe to document services personally performed by Anjel Busby MD at this visit, based upon the provider's statements to me. All documentation has been reviewed by the aforementioned provider prior to being entered into the official medical record.    Scribe Preparation Attestation:  Marybel MAHARAJ, a scribe, prepared the chart for today's encounter.      Portions of this medical record were completed by a scribe. UPON MY REVIEW AND AUTHENTICATION BY ELECTRONIC SIGNATURE, this confirms (a) I performed the applicable clinical services, and (b) the record is accurate.   Anjel Busby MD MD

## 2019-11-20 NOTE — PROGRESS NOTES
Patient comes to wound clinic for wound assessment per request of dr. Cardona. She has history of a Open surgical wound due to ECMO : DISCHARGE DIAGNOSIS Pulmonary EmbolismPEA ArrestBilateral pleural effusionsVolume overloadSinus pauseHTN Sleep apnea  Left groin wound Pannus wound Clean gloves are donned and current dressing removed. Previous treatment includes: Polymem    First date of treatment: 10/18/19    Objective findings: enlarged but shallow    Location: left  groin      Open Post-operative wound: Yes    Wound measured.: 4 cm x 2 cm x 0.2 cm , Stage: Stage 3: Full thickness skin loss with subcutaneous involvement     Wound description: no sign of infection    Tenderness:mildly    Odor: none    Drainage is moderate, serosanguinous     Tunneling:  N/A      Undermining: N/A    Wound Base: granulation and slough    compared to 10/07                      Subjective finding:     Pt states: doing well    Patient is assessed for discomfort which is: minimal    Today's status of the wound: wound enlarged due to Polymem cut too large    Treatment: Removal of existing dressing, Visual inspection, Cleansing with Vashe solution, Wound packing with polymem, Application of clean dressing  Non-excisional debridement (no cutting was done), however removal of debris was performed with swabs, gauze and/or wet to dry dressings     Pt received the following instructions:  Continue with current cares but use HydroferaBlue dressing instead of Polymem. Vaseline to the wound edges  Cleansing with Vashe pack , soak for 5 minutes. Primary Dressing Polymem. Secondary Dressing: gauze and tape  Secure with: tape. Frequency: daily.   Signs and symptoms of infection taught.  Patient needs to be seen 3 weeks. Treated and follow-up appointment made. Annette Smart was available for supervision of care if needed or if questions should arise and regarding plan of care.  Margot Dye RN, CWON

## 2019-11-20 NOTE — NURSING NOTE
Chief Complaint   Patient presents with     Recheck Medication     pt here for follow up       Kika Crowell CMA, EMT at 10:29 AM on 11/20/2019.

## 2019-11-21 ENCOUNTER — HOSPITAL ENCOUNTER (OUTPATIENT)
Dept: PHYSICAL THERAPY | Facility: CLINIC | Age: 59
Setting detail: THERAPIES SERIES
End: 2019-11-21
Attending: PHYSICAL MEDICINE & REHABILITATION
Payer: COMMERCIAL

## 2019-11-21 DIAGNOSIS — M05.742 RHEUMATOID ARTHRITIS INVOLVING BOTH HANDS WITH POSITIVE RHEUMATOID FACTOR (H): ICD-10-CM

## 2019-11-21 DIAGNOSIS — M05.741 RHEUMATOID ARTHRITIS INVOLVING BOTH HANDS WITH POSITIVE RHEUMATOID FACTOR (H): ICD-10-CM

## 2019-11-21 PROCEDURE — 97110 THERAPEUTIC EXERCISES: CPT | Mod: GP | Performed by: PHYSICAL THERAPIST

## 2019-11-21 RX ORDER — METHOTREXATE 25 MG/ML
25 INJECTION, SOLUTION INTRA-ARTERIAL; INTRAMUSCULAR; INTRAVENOUS
Qty: 4 ML | Refills: 3 | Status: SHIPPED | OUTPATIENT
Start: 2019-11-21 | End: 2020-02-14

## 2019-11-21 NOTE — TELEPHONE ENCOUNTER
METHOTREXATE 50 MG/2 ML VIAL  Last Written Prescription Date: 10/23/2019  Last Fill Quantity: 4 mL,   # refills: 3  Last Office Visit: 10/23/2019  Future Office visit:  2/18/2020    CBC RESULTS:   Recent Labs   Lab Test 10/22/19  1241   WBC 7.0   RBC 3.85   HGB 11.6*   HCT 36.3   MCV 94   MCH 30.1   MCHC 32.0   RDW 17.1*          Creatinine   Date Value Ref Range Status   11/20/2019 0.64 0.52 - 1.04 mg/dL Final   ]    Liver Function Studies -   Recent Labs   Lab Test 10/22/19  1241 10/10/19  0831   PROTTOTAL  --  7.3   ALBUMIN  --  2.9*   BILITOTAL  --  0.4   ALKPHOS  --  73   AST 18 16   ALT 27 21       Routing refill request to provider for review/approval because:  Drug not on the [facility], [facility] or  Health refill protocol or controlled substance      Emperatriz Beltre RN  Central Triage Red Flags/Med Refills

## 2019-11-22 ENCOUNTER — TELEPHONE (OUTPATIENT)
Dept: INTERNAL MEDICINE | Facility: CLINIC | Age: 59
End: 2019-11-22

## 2019-11-22 NOTE — TELEPHONE ENCOUNTER
PA Initiation    Medication: ARIPiprazole (ABILIFY) 5 MG   Insurance Company: [employer] - Phone [phone] Fax [phone]  Pharmacy Filling the Rx: [id number] - [place], [place] - [address]  Filling Pharmacy Phone:    Filling Pharmacy Fax:    Start Date: 11/22/2019

## 2019-11-22 NOTE — TELEPHONE ENCOUNTER
Insurance only covers 90 days per year.      Prior Authorization Retail Medication Request    Medication/Dose: ARIPiprazole (ABILIFY) 5 MG   ICD code (if different than what is on RX):    Previously Tried and Failed:    Rationale:      Insurance Name:    Insurance ID:        Pharmacy Information (if different than what is on RX)  Name:    Phone:

## 2019-11-25 ENCOUNTER — MYC MEDICAL ADVICE (OUTPATIENT)
Dept: FAMILY MEDICINE | Facility: CLINIC | Age: 59
End: 2019-11-25

## 2019-11-25 DIAGNOSIS — E87.70 HYPERVOLEMIA, UNSPECIFIED HYPERVOLEMIA TYPE: ICD-10-CM

## 2019-11-25 RX ORDER — FUROSEMIDE 40 MG
80 TABLET ORAL DAILY
Qty: 90 TABLET | Refills: 3
Start: 2019-11-25 | End: 2020-01-31

## 2019-11-25 NOTE — TELEPHONE ENCOUNTER
Sent a replied to patient via [employer] / [employer]. Updated medication list. Kelly Walsh LPN 11/25/2019 3:34 PM

## 2019-11-26 ENCOUNTER — HOSPITAL ENCOUNTER (OUTPATIENT)
Dept: PHYSICAL THERAPY | Facility: CLINIC | Age: 59
Setting detail: THERAPIES SERIES
End: 2019-11-26
Attending: PHYSICAL MEDICINE & REHABILITATION
Payer: COMMERCIAL

## 2019-11-26 PROCEDURE — 97112 NEUROMUSCULAR REEDUCATION: CPT | Mod: GP | Performed by: PHYSICAL THERAPIST

## 2019-11-26 PROCEDURE — 97110 THERAPEUTIC EXERCISES: CPT | Mod: GP | Performed by: PHYSICAL THERAPIST

## 2019-11-29 NOTE — TELEPHONE ENCOUNTER
A PA is not needed for this medication per the insurance. The pharmacy confirmed that the medication was filled and sold to the patient on 11/25/2019; no further PA activity needed at this time.

## 2019-12-02 ENCOUNTER — ANTICOAGULATION THERAPY VISIT (OUTPATIENT)
Dept: ANTICOAGULATION | Facility: CLINIC | Age: 59
End: 2019-12-02

## 2019-12-02 DIAGNOSIS — I26.01 ACUTE SEPTIC PULMONARY EMBOLISM WITH ACUTE COR PULMONALE (H): ICD-10-CM

## 2019-12-02 DIAGNOSIS — I26.99 PULMONARY EMBOLISM, UNSPECIFIED CHRONICITY, UNSPECIFIED PULMONARY EMBOLISM TYPE, UNSPECIFIED WHETHER ACUTE COR PULMONALE PRESENT (H): ICD-10-CM

## 2019-12-02 LAB — INR PPP: 2.02 (ref 0.86–1.14)

## 2019-12-02 PROCEDURE — 36415 COLL VENOUS BLD VENIPUNCTURE: CPT | Performed by: FAMILY MEDICINE

## 2019-12-02 PROCEDURE — 85610 PROTHROMBIN TIME: CPT | Performed by: FAMILY MEDICINE

## 2019-12-02 NOTE — PROGRESS NOTES
ANTICOAGULATION FOLLOW-UP CLINIC VISIT    Patient Name:  Shira Rodriguez  Date:  2019  Contact Type:  Telephone    SUBJECTIVE:  Patient Findings     Comments:   Denies any changes to diet or missed doses         Clinical Outcomes     Negatives:   Major bleeding event, Thromboembolic event, Anticoagulation-related hospital admission, Anticoagulation-related ED visit, Anticoagulation-related fatality    Comments:   Denies any changes to diet or missed doses            OBJECTIVE    INR   Date Value Ref Range Status   2019 2.02 (H) 0.86 - 1.14 Final       ASSESSMENT / PLAN  INR assessment THER    Recheck INR In: 1 WEEK    INR Location Clinic      Anticoagulation Summary  As of 2019    INR goal:   2.0-3.0   TTR:   68.8 % (1.4 mo)   INR used for dosin.02 (2019)   Warfarin maintenance plan:   7.5 mg (5 mg x 1.5) every day   Full warfarin instructions:   : 10 mg; Otherwise 7.5 mg every day   Weekly warfarin total:   52.5 mg   Plan last modified:   Mariah Sellers RN (11/15/2019)   Next INR check:   2019   Priority:   Maintenance   Target end date:       Indications    Pulmonary emboli (H) [I26.99]             Anticoagulation Episode Summary     INR check location:       Preferred lab:       Send INR reminders to:   [facility]    Comments:   HIPPA Forms returned 19  OK to leave messages on Cell  [phone]      Anticoagulation Care Providers     Provider Role Specialty Phone number    Anjel Busby MD [facility] [phone]            See the Encounter Report to view Anticoagulation Flowsheet and Dosing Calendar (Go to Encounters tab in chart review, and find the Anticoagulation Therapy Visit)    Spoke with patient. Gave them their lab results and new warfarin recommendation.  No changes in health, medication, or diet. No missed doses, no falls. No signs or symptoms of bleed or clotting.     Patience Martins, ELLIS

## 2019-12-03 ENCOUNTER — HOSPITAL ENCOUNTER (OUTPATIENT)
Dept: PHYSICAL THERAPY | Facility: CLINIC | Age: 59
Setting detail: THERAPIES SERIES
End: 2019-12-03
Attending: PHYSICAL MEDICINE & REHABILITATION
Payer: COMMERCIAL

## 2019-12-03 PROCEDURE — 97112 NEUROMUSCULAR REEDUCATION: CPT | Mod: GP | Performed by: PHYSICAL THERAPIST

## 2019-12-03 PROCEDURE — 97110 THERAPEUTIC EXERCISES: CPT | Mod: GP | Performed by: PHYSICAL THERAPIST

## 2019-12-05 ASSESSMENT — ENCOUNTER SYMPTOMS
DYSPNEA ON EXERTION: 1
LEG PAIN: 0
EXERCISE INTOLERANCE: 1
SPEECH CHANGE: 0
MUSCLE WEAKNESS: 0
LOSS OF CONSCIOUSNESS: 0
BACK PAIN: 1
DISTURBANCES IN COORDINATION: 0
SEIZURES: 0
ARTHRALGIAS: 1
PARALYSIS: 0
CHILLS: 0
WHEEZING: 0
SLEEP DISTURBANCES DUE TO BREATHING: 0
FEVER: 0
DEPRESSION: 1
NERVOUS/ANXIOUS: 0
NUMBNESS: 0
WEIGHT GAIN: 0
FATIGUE: 1
DECREASED CONCENTRATION: 0
POLYDIPSIA: 0
WEAKNESS: 1
HYPOTENSION: 0
PALPITATIONS: 0
STIFFNESS: 1
WEIGHT LOSS: 0
ORTHOPNEA: 1
HYPERTENSION: 0
TINGLING: 0
NIGHT SWEATS: 0
MEMORY LOSS: 0
SPUTUM PRODUCTION: 1
ALTERED TEMPERATURE REGULATION: 0
COUGH DISTURBING SLEEP: 0
SHORTNESS OF BREATH: 1
POSTURAL DYSPNEA: 1
SNORES LOUDLY: 0
TREMORS: 0
INCREASED ENERGY: 0
COUGH: 1
POLYPHAGIA: 0
HEADACHES: 0
HALLUCINATIONS: 0
HEMOPTYSIS: 0
NECK PAIN: 0
JOINT SWELLING: 0
MUSCLE CRAMPS: 0
INSOMNIA: 0
MYALGIAS: 0
SYNCOPE: 0
PANIC: 0
DECREASED APPETITE: 0
DIZZINESS: 0

## 2019-12-10 ENCOUNTER — HOSPITAL ENCOUNTER (OUTPATIENT)
Dept: PHYSICAL THERAPY | Facility: CLINIC | Age: 59
Setting detail: THERAPIES SERIES
End: 2019-12-10
Attending: PHYSICAL MEDICINE & REHABILITATION
Payer: COMMERCIAL

## 2019-12-10 DIAGNOSIS — I26.99 PULMONARY EMBOLISM, UNSPECIFIED CHRONICITY, UNSPECIFIED PULMONARY EMBOLISM TYPE, UNSPECIFIED WHETHER ACUTE COR PULMONALE PRESENT (H): ICD-10-CM

## 2019-12-10 LAB — INR PPP: 2.57 (ref 0.86–1.14)

## 2019-12-10 PROCEDURE — 36415 COLL VENOUS BLD VENIPUNCTURE: CPT | Performed by: FAMILY MEDICINE

## 2019-12-10 PROCEDURE — 97110 THERAPEUTIC EXERCISES: CPT | Mod: GP | Performed by: PHYSICAL THERAPIST

## 2019-12-10 PROCEDURE — 85610 PROTHROMBIN TIME: CPT | Performed by: FAMILY MEDICINE

## 2019-12-10 NOTE — PROGRESS NOTES
12/10/19 1330   Signing Clinician's Name / Credentials   Signing clinician's name / credentials Stan Urbina DPT   Session Number   Session Number 11/20 (8 used previous EOC) - [employer]   Progress Note/Recertification   Progress Note Due Date 01/15/20   Adult Goals   PT Eval Goals 1;2;3;4;5   Goal 1   Goal Identifier TUG   Goal Description Pt to demo TUG in <10'' to show improving functional mobility status and reduced fall risk (baseline 17.89'' with FWW).   Target Date 01/15/20   Goal 2   Goal Identifier Gait Speed   Goal Description Pt will demo gait speed of >1.0 m/sec to show improving functional community speeds and reduced fall risk (baseline 0.67 m/sec with FWW).   Target Date 01/15/20   Goal 3   Goal Identifier 30'' Chair Stand   Goal Description Pt to demo 12 or more reps chair stand from 18'' surface in 30'' to show improving strength and and functional mobility status (baseline 6 reps).   Target Date 01/15/20   Goal 4   Goal Identifier Balance   Goal Description Pt to demo ability to safely reach to floor to  items without UE support, no LOB, for improved stability with ADLs and functional mobility.   Target Date 01/15/20   Goal 5   Goal Identifier HEP   Goal Description Pt will demo (I) with HEP for ongoing management/improvement in condition and optimal QOL.   Target Date 01/15/20   Subjective Report   Subjective Report Shira reports continued progress in overall strength and gait stability, although voices frustration with regard to limited progress of late, primarily due to SOB and elevated HR response to activity. Has f/u with cardiology next week.  She also reports acute onset R popliteal pain today, seemed to improve as she walked/moved around, but voices concern about DVT.   Objective Measure 1   Objective Measure 30'' Chair stand   Details 7 reps from 17'' chair, improved from 6 reps at 18'' surface at eval   Objective Measure 2   Objective Measure Gait Speed   Details  0.85 m/sec self selected gait speed, 1.3 m/sec 'fast' gait speed, improved from 0.67 m/sec at eval   Objective Measure 3   Objective Measure TUG   Details 9.1'' no AD, improved from 17.89'' at eval   Objective Measure 4   Objective Measure Skin Assessment   Details R popliteal area inspected d/t reported pain: no warmth, redness, swelling noted.   Vitals Signs   SpO2 94   Vital Signs Comments Resting -115 bpm, up to 140 bpm with activity   Therapeutic Procedure/exercise   Therapeutic Procedures: strength, endurance, ROM, flexibillity minutes (48732) 40   Skilled Intervention cues/demo, ed on HEP, monitoring vitals/tolerance   Patient Response HR at rest 101-113 bpm up to 140bpm with activity, SANTOS but SaO2 stable >93% throughout, needing standing/seated rest breaks to recover and reduce HR to 115-120 2-3 min.   Treatment Detail W/u on R-stepper x 4' resistance level 1.0>1.5.  Reassessment of TUG, gait speed measures, Chair stand tests and interpretation of scores.  Standing doorway anterior chest chest 3x20'' holds.  Trial large retro step with simultaneous B overhead/horiz ABD with deep inhalation each leg x 5 reps, tried with light resistance with weight stack but too difficult.  Time spent educating/reviewing proper PLB technique and importance of posture and pacing strategies with activity, encouraged IS use, will bring in to future sessions.  Standing B shoulder flexion overhead with deep breathing and controlled exhalation, progressing to diagonal overhead flexion reps.  High intensity interval resisted walking no AD in hallways 4x70' distances with standing rest breaks.   Progress All goals in progress, making progress toward all goals but limited lately by SANTOS, fatigue, elevated HR response.   Education   Learner Patient   Readiness Acceptance   Method Explanation;Demonstration   Response Verbalizes Understanding;Demonstrates Understanding   Education Comments HEP, monitoring symptoms   Plan   Home  program Chair stands x 30 reps/day, walking program 2x/day into hallways, standing endurance/wt shifts to fatigue, gastroc/soleus stretching, quick/high knees in place to fatigue, side/retro stepping challenges in hallway, gait with head turns weaning AD   Plan for next session Review/emphasize pacing strategies, postural correction to optimize pulm function, strengthening/conditioning, conforming surfaces, trunk rotation, dynamic balance training, review cardiology appt   Comments   Comments Shira has been seen for a total 11 PT visits during this EOC to address deconditioned status following complex hospitalization/medical event.  She has made steady progress toward all goals, improving strength, balance, gait speed, and gait stability/balance. Over past month, pt has been limited by progress SANTOS, elevated HR, and limited activity tolerance.  Her resting HR is typically 105-115bpm, up to 140 bpm with exertion.  SANTOS noted, but SaO2 relatively stable >92% on room air.  She shows good participation with therapy sessions, but voices frustration/anxiety at times regarding her complex medical status and lack of clear subjective progress.  She is encouraged to f/u with cardiologist next week, which she will address/question her elevated HR issues.  Pt is appropriate to continue skilled PT services in effort to progress toward all goals, optimize functional status, and become independent with HEP. Will plan to continue POC 2x/wk reducing to 1x/wk as appropriate x 8-12 additional weeks.   Total Session Time   Timed Code Treatment Minutes 40   Total Treatment Time (sum of timed and untimed services) 40   AMBULATORY CLINICS ONLY-MEDICAL AND TREATMENT DIAGNOSIS   Medical Diagnosis Cardiac arrest (H) I46.9, Pulmonary embolism, unspecified chronicity, unspecified pulmonary embolism type, unspecified whether acute cor pulmonale present (H) I26.99    PT Diagnosis Deconditioned status with impaired functional mobility

## 2019-12-11 ENCOUNTER — ANTICOAGULATION THERAPY VISIT (OUTPATIENT)
Dept: ANTICOAGULATION | Facility: CLINIC | Age: 59
End: 2019-12-11

## 2019-12-11 DIAGNOSIS — I26.01 ACUTE SEPTIC PULMONARY EMBOLISM WITH ACUTE COR PULMONALE (H): ICD-10-CM

## 2019-12-11 NOTE — PROGRESS NOTES
ANTICOAGULATION FOLLOW-UP CLINIC VISIT    Patient Name:  Shira Rodriguez  Date:  2019  Contact Type:  Telephone    SUBJECTIVE:         OBJECTIVE    INR   Date Value Ref Range Status   12/10/2019 2.57 (H) 0.86 - 1.14 Final       ASSESSMENT / PLAN  INR assessment THER    Recheck INR In: 1 WEEK    INR Location Clinic      Anticoagulation Summary  As of 2019    INR goal:   2.0-3.0   TTR:   73.8 % (1.7 mo)   INR used for dosin.57 (12/10/2019)   Warfarin maintenance plan:   7.5 mg (5 mg x 1.5) every day   Full warfarin instructions:   7.5 mg every day   Weekly warfarin total:   52.5 mg   No change documented:   Rich Noe RN   Plan last modified:   Mariah Sellers RN (11/15/2019)   Next INR check:   2019   Priority:   Maintenance   Target end date:       Indications    Pulmonary emboli (H) [I26.99]             Anticoagulation Episode Summary     INR check location:       Preferred lab:       Send INR reminders to:   [facility]    Comments:   HIPPA Forms returned 19  OK to leave messages on Cell  [phone]      Anticoagulation Care Providers     Provider Role Specialty Phone number    Anjel Busby MD [facility] [phone]            See the Encounter Report to view Anticoagulation Flowsheet and Dosing Calendar (Go to Encounters tab in chart review, and find the Anticoagulation Therapy Visit)    INR/CFX/F2 RESULT: INR result is 2.57    ASSESSMENT:No Problems found. No Changes in Health, Medications, or diet. No Signs of bruising, bleeding or clotting.    DOSING ADJUSTMENT: Recommended 7.5mg daily    NEXT INR/FACTOR X OR FACTOR II:  at appt with Dr. Busby    PROTOCOL FOLLOWED:goal 2-3    Rich Noe RN

## 2019-12-12 ENCOUNTER — PRE VISIT (OUTPATIENT)
Dept: FAMILY MEDICINE | Facility: CLINIC | Age: 59
End: 2019-12-12

## 2019-12-12 ENCOUNTER — OFFICE VISIT (OUTPATIENT)
Dept: WOUND CARE | Facility: CLINIC | Age: 59
End: 2019-12-12
Payer: COMMERCIAL

## 2019-12-12 ENCOUNTER — OFFICE VISIT (OUTPATIENT)
Dept: CARDIOLOGY | Facility: CLINIC | Age: 59
End: 2019-12-12
Attending: NURSE PRACTITIONER
Payer: COMMERCIAL

## 2019-12-12 VITALS
DIASTOLIC BLOOD PRESSURE: 83 MMHG | OXYGEN SATURATION: 94 % | HEART RATE: 93 BPM | HEIGHT: 66 IN | RESPIRATION RATE: 20 BRPM | BODY MASS INDEX: 47.09 KG/M2 | WEIGHT: 293 LBS | SYSTOLIC BLOOD PRESSURE: 127 MMHG

## 2019-12-12 DIAGNOSIS — E87.6 HYPOKALEMIA: ICD-10-CM

## 2019-12-12 DIAGNOSIS — I50.810 RVF (RIGHT VENTRICULAR FAILURE) (H): ICD-10-CM

## 2019-12-12 DIAGNOSIS — I26.99 OTHER ACUTE PULMONARY EMBOLISM WITHOUT ACUTE COR PULMONALE (H): ICD-10-CM

## 2019-12-12 DIAGNOSIS — I26.09 PULMONARY EMBOLISM WITH ACUTE COR PULMONALE, UNSPECIFIED CHRONICITY, UNSPECIFIED PULMONARY EMBOLISM TYPE (H): ICD-10-CM

## 2019-12-12 DIAGNOSIS — I50.810 RVF (RIGHT VENTRICULAR FAILURE) (H): Primary | ICD-10-CM

## 2019-12-12 DIAGNOSIS — S31.109D WOUND OF LEFT GROIN, SUBSEQUENT ENCOUNTER: Primary | ICD-10-CM

## 2019-12-12 LAB
ANION GAP SERPL CALCULATED.3IONS-SCNC: 7 MMOL/L (ref 3–14)
BUN SERPL-MCNC: 11 MG/DL (ref 7–30)
CALCIUM SERPL-MCNC: 9.9 MG/DL (ref 8.5–10.1)
CHLORIDE SERPL-SCNC: 105 MMOL/L (ref 94–109)
CO2 SERPL-SCNC: 27 MMOL/L (ref 20–32)
CREAT SERPL-MCNC: 0.69 MG/DL (ref 0.52–1.04)
ERYTHROCYTE [DISTWIDTH] IN BLOOD BY AUTOMATED COUNT: 15.7 % (ref 10–15)
GFR SERPL CREATININE-BSD FRML MDRD: >90 ML/MIN/{1.73_M2}
GLUCOSE SERPL-MCNC: 102 MG/DL (ref 70–99)
HCT VFR BLD AUTO: 41.5 % (ref 35–47)
HGB BLD-MCNC: 13.4 G/DL (ref 11.7–15.7)
MCH RBC QN AUTO: 30.1 PG (ref 26.5–33)
MCHC RBC AUTO-ENTMCNC: 32.3 G/DL (ref 31.5–36.5)
MCV RBC AUTO: 93 FL (ref 78–100)
PLATELET # BLD AUTO: 290 10E9/L (ref 150–450)
POTASSIUM SERPL-SCNC: 4 MMOL/L (ref 3.4–5.3)
RBC # BLD AUTO: 4.45 10E12/L (ref 3.8–5.2)
SODIUM SERPL-SCNC: 139 MMOL/L (ref 133–144)
WBC # BLD AUTO: 9.1 10E9/L (ref 4–11)

## 2019-12-12 PROCEDURE — G0463 HOSPITAL OUTPT CLINIC VISIT: HCPCS | Mod: ZF

## 2019-12-12 PROCEDURE — 80048 BASIC METABOLIC PNL TOTAL CA: CPT | Performed by: NURSE PRACTITIONER

## 2019-12-12 PROCEDURE — 36415 COLL VENOUS BLD VENIPUNCTURE: CPT | Performed by: NURSE PRACTITIONER

## 2019-12-12 PROCEDURE — 99214 OFFICE O/P EST MOD 30 MIN: CPT | Mod: ZP | Performed by: NURSE PRACTITIONER

## 2019-12-12 PROCEDURE — 85027 COMPLETE CBC AUTOMATED: CPT | Performed by: NURSE PRACTITIONER

## 2019-12-12 RX ORDER — WARFARIN SODIUM 5 MG/1
TABLET ORAL
Qty: 50 TABLET | Refills: 1 | Status: SHIPPED | OUTPATIENT
Start: 2019-12-12 | End: 2020-02-06

## 2019-12-12 ASSESSMENT — PAIN SCALES - GENERAL: PAINLEVEL: SEVERE PAIN (6)

## 2019-12-12 ASSESSMENT — MIFFLIN-ST. JEOR: SCORE: 2322.69

## 2019-12-12 NOTE — PROGRESS NOTES
Patient comes to wound clinic for wound assessment per request of dr. Cardona. She has history of a Open surgical wound due to ECMO : DISCHARGE DIAGNOSIS Pulmonary EmbolismPEA ArrestBilateral pleural effusionsVolume overloadSinus pauseHTN Sleep apnea  Left groin wound Pannus wound Clean gloves are donned and current dressing removed. Previous treatment includes: Polymem    First date of treatment: 10/18/19    Objective findings: enlarged but shallow    Location: left  groin      Open Post-operative wound: Yes    Wound measured.: 2 cm x 1.5 cm x 0.2 cm , Stage: Stage 3: Full thickness skin loss with subcutaneous involvement     Wound description: no sign of infection    Tenderness:mildly    Odor: none    Drainage is moderate, serosanguinous     Tunneling:  N/A      Undermining: N/A    Wound Base: granulation and slough    compared to 10/07                     Subjective finding:     Pt states: doing well    Patient is assessed for discomfort which is: minimal    Today's status of the wound: wound less deep and looking good    Treatment: Removal of existing dressing, Visual inspection, Cleansing with Microklenz spray,, Wound packing with HydroferaBlue dressing   Application of clean dressing.  Pt received the following instructions:  Continue with current cares but use HydroferaBlue dressing . Vaseline to the wound edges  Cleansing with soap and water or Microklenz spray,  , soak for 5 minutes. Primary Dressing Polymem. Secondary Dressing: gauze and tape  Secure with: tape. Frequency: EOD or daily   Signs and symptoms of infection taught.  Patient needs to be seen 2 weeks. Treated and follow-up appointment made. Annette Smart was available for supervision of care if needed or if questions should arise and regarding plan of care.  Margot Dey RN, CWON

## 2019-12-12 NOTE — PROGRESS NOTES
"    Chief Complaint:   Chief Complaint   Patient presents with     RECHECK     follow up after cardiac arrest 10-7-19       HPI:    Ms. Rodriguez is a 59 year old female with a history of CYRUS, HTN, tobacco use disorder, who presents for a follow up appointment after PEA arrest secondary to massive PE. Today she endorses worsened dyspnea on exertion and a productive cough. She has multiple questions after her hospitalization, including concern about her borderline high pulse, especially when she goes to her PT appointments. She also expresses concern about pursuing a sleep study to assess her CYRUS being pharmacologically treated with Ambien.        PAST MEDICAL HISTORY:  Past Medical History:   Diagnosis Date     Chronic bronchitis (H) 07/30/2015     Contact dermatitis      Depressive disorder 1985     Eczema     HANDS     Elevated liver enzymes      H/O total knee replacement, left      History of total hip replacement 10/27/2011     Hyperlipidemia      Hypertension      Morbid obesity (H)      Osteoarthrosis     right knee     Sleep apnea      Tobacco use disorder        CURRENT MEDICATIONS:  Current Outpatient Medications   Medication Sig Dispense Refill     acetaminophen (TYLENOL) 325 MG tablet Take 2 tablets (650 mg) by mouth every 6 hours as needed for mild pain or fever       ARIPiprazole (ABILIFY) 5 MG tablet Take 1 tablet (5 mg) by mouth daily 90 tablet 3     atorvastatin (LIPITOR) 40 MG tablet Take 1 tablet (40 mg) by mouth every evening 90 tablet 3     DULoxetine (CYMBALTA) 60 MG capsule Take 1 capsule (60 mg) by mouth daily 30 capsule 0     folic acid (FOLVITE) 1 MG tablet Take 4 tablets (4 mg) by mouth daily Take 3-4 tablets daily 120 tablet 0     furosemide (LASIX) 40 MG tablet Take 2 tablets (80 mg) by mouth daily 90 tablet 3     insulin syringe-needle U-100 (BD INSULIN SYRINGE ULTRAFINE) 30G X 1/2\" 1 ML miscellaneous For methotrexate use weekly 8 each prn     leucovorin (WELLCOVORIN) 5 MG tablet Take 1 " tablet (5 mg) by mouth every 7 days *take 24h after taking weekly methotrexate dose 4 tablet 3     lisinopril (PRINIVIL/ZESTRIL) 5 MG tablet Take 1 tablet (5 mg) by mouth daily 90 tablet 3     methotrexate 50 MG/2ML injection Inject 1 mL (25 mg) Subcutaneous every 7 days 4 mL 3     nystatin (MYCOSTATIN) 202373 UNIT/GM external powder Apply to coat rash tid, neck and breast area rashes 60 g 3     potassium chloride ER (MICRO-K) 10 MEQ CR capsule Take 1 capsule (10 mEq) by mouth 2 times daily 180 capsule 3     zolpidem ER (AMBIEN CR) 6.25 MG CR tablet Take 1 tablet (6.25 mg) by mouth nightly as needed for sleep 1 tablet 0     warfarin ANTICOAGULANT (COUMADIN) 5 MG tablet Take 1.5 tablets daily or as directed. 50 tablet 1       PAST SURGICAL HISTORY:  Past Surgical History:   Procedure Laterality Date     ARTHROPLASTY KNEE  6/14/2012    Procedure: ARTHROPLASTY KNEE;  Left Total Knee Arthroplasty;  Surgeon: Annette Qeusada MD;  Location: [facility] OR     ARTHROSCOPY HIP Right      BRONCHOSCOPY FLEXIBLE AND RIGID N/A 9/17/2019    Procedure: Bronchoscopy flexible;  Surgeon: Patrick Rayo MD;  [facility]      TOTAL HIP ARTHROPLASTY  07/09/04    RT     CV EXTRACORPERAL MEMBRANE OXYGENATION N/A 9/9/2019    Procedure: Extracorporeal Membrance Oxygenation;  Surgeon: Kaleb Mcfarlane MD;  Location:  [facility]     INSERT EXTRACORPORAL MEMBRANE OXYGENATOR N/A 9/17/2019    Procedure: Venous-Venous cannulation using ultrasound guidance and transesophageal echocardiogram, venous-arterial ecmo decannulation and repair of left femoral artery;  Surgeon: Patrick Rayo MD;  Location: [facility] OR     IR [facility] THROMB PRIM ART, NON-INTRACRNL  9/10/2019     IR PULMONARY ANGIOGRAM BILATERAL  9/10/2019     IR THROMBOLYSIS ARTERIAL INFUSION INITIAL DAY  9/10/2019       ALLERGIES     Allergies   Allergen Reactions     Adhesive Tape Blisters     Erythromycin Rash       FAMILY HISTORY:  Family History   Problem  Relation Age of Onset     Thyroid Disease Mother      Hypertension Mother      Skin Cancer Mother         MMohs surgery with skin graft     Cerebrovascular Disease Mother         CVA after endarderectomy     Diabetes Mother      Breast Cancer Paternal Grandmother      Pancreatic Cancer Paternal Grandmother      Cardiovascular Paternal Aunt      Cardiovascular Paternal Uncle      Depression Other      Alcoholism Father      Thyroid Disease Sister      Alcoholism Sister      Hypertension Maternal Grandmother      Heart Disease Sister         multiple ablations     Alcoholism Sister      Prostate Cancer Paternal Grandfather        SOCIAL HISTORY:  Social History     Socioeconomic History     Marital status: Single     Spouse name: None     Number of children: None     Years of education: None     Highest education level: None   Occupational History     Occupation: registered nurse     Comment: [employer]   Social Needs     Financial resource strain: None     Food insecurity:     Worry: None     Inability: None     Transportation needs:     Medical: None     Non-medical: None   Tobacco Use     Smoking status: Former Smoker     Packs/day: 1.00     Years: 33.00     Pack years: 33.00     Types: Cigarettes     Start date: 1982     Last attempt to quit: 2019     Years since quittin.2     Smokeless tobacco: Never Used     Tobacco comment: maybe   Substance and Sexual Activity     Alcohol use: Yes     Alcohol/week: 0.0 standard drinks     Comment: occassional     Drug use: No     Sexual activity: Never   Lifestyle     Physical activity:     Days per week: None     Minutes per session: None     Stress: None   Relationships     Social connections:     Talks on phone: None     Gets together: None     Attends Quaker service: None     Active member of club or organization: None     Attends meetings of clubs or organizations: None     Relationship status: None     Intimate partner violence:     Fear of current or ex  "partner: None     Emotionally abused: None     Physically abused: None     Forced sexual activity: None   Other Topics Concern     Parent/sibling w/ CABG, MI or angioplasty before 65F 55M? Not Asked   Social History Narrative     None       ROS:    ROS:all other systems negative, other than the symptoms noted above in the HPI.     EXAM:  /83   Pulse 93   Resp 20   Ht 1.676 m (5' 6\")   Wt (!) 172.6 kg (380 lb 8 oz)   SpO2 94%   BMI 61.41 kg/m    GEN:patient is in no apparent distress.    HEENT: NC/AT.  Sclerae white, no incertus  Neck: No adenopathy.Carotids brisk bilaterally without bruits.  No jugular venous distension.   Heart:RRR. Normal S1, S2. No murmur, rub, click, or gallop.   Lungs: Lungs clear to ausculation bilaterally.  Diminished breath sounds in lower left lung. No ronchi, wheezes, rales.   Abdomen: Soft, nontender, nondistended.  Extremities: No clubbing, cyanosis, or edema.  The pulses are 2+ at the bilateral radial, DP, and PT  Neurologic: Awake and alert, normal speech, gait and affect  Skin: No petechiae, purpura or rash noted. Chronic venous stasis changes.    Labs:  LIPID RESULTS:  Lab Results   Component Value Date    CHOL 174 04/23/2019    HDL 51 04/23/2019    LDL 97 04/23/2019    TRIG 152 (H) 09/10/2019    CHOLHDLRATIO 4.5 06/01/2012       LIVER ENZYME RESULTS:  Lab Results   Component Value Date    AST 18 10/22/2019    ALT 27 10/22/2019       CBC RESULTS:  Lab Results   Component Value Date    WBC 7.0 10/22/2019    RBC 3.85 10/22/2019    HGB 11.6 (L) 10/22/2019    HCT 36.3 10/22/2019    MCV 94 10/22/2019    MCH 30.1 10/22/2019    MCHC 32.0 10/22/2019    RDW 17.1 (H) 10/22/2019     10/22/2019       BMP RESULTS:  Lab Results   Component Value Date     11/20/2019    POTASSIUM 4.0 11/20/2019    CHLORIDE 107 11/20/2019    CO2 26 11/20/2019    ANIONGAP 7 11/20/2019     (H) 11/20/2019    BUN 11 11/20/2019    CR 0.64 11/20/2019    GFRESTIMATED >90 11/20/2019    " GFRESTBLACK >90 11/20/2019    MARIA INES 9.7 11/20/2019        A1C RESULTS:  Lab Results   Component Value Date    A1C 5.6 09/10/2019       INR RESULTS:  Lab Results   Component Value Date    INR 2.57 (H) 12/10/2019    INR 2.02 (H) 12/02/2019         Assessment and Plan:   Dyspnea  Last echo was performed inpatient in September. Schedule updated TTE to assess heart function and EF. Obtain CBC to check for anemia as a cause of her dyspnea and tachycardia. Consider a VQ lung scan to r/o chronic VTE.      Pulmonary embolism with acute cor pulmonale, unspecified chronicity, unspecified pulmonary embolism type (H)  Cardiac arrest (H)  Patient has stopped smoking since being in the hospital. She is therapeutically anticoagulated on coumadin. She is compliant with her cardiac medications. Lipid levels are WNL. Discussed weight loss as a key component to reducing her risk for more PE events and cardiac disease.      Obstructive Sleep Apnea  Follow up with PCP and sleep medicine provider regarding recommendations for mask that is appropriate for patient and alternative treatments to Ambien to yield a deeper sleep, such as melatonin.       Follow-up: Return in about 4 weeks to see KEVIN Fish  12/12/19  8:58 AM    I was present with the NPP student who participated in the service and in the documentation of the services provided. I have verified the history and personally performed the physical exam and medical decision making, as documented by the student and edited by me.    Breath sounds slightly diminished at the left base right lung is clear to auscultation.  Regular rate and rhythm.  She reports exertional breathlessness that she believes has slightly worsened although she tells me it is not as bad as what she was having prior to being hospitalized.  From a volume perspective she appears to be euvolemic.  I'm concerned she may have developed CTEPH given her progressive dyspnea on exertion.  At this time I  am going to update her echocardiogram and will check a CBC. Will also pursue a VQ scan to rule out chronic VTE.  If she has developed CTEPH will refer to Dr Mayen.  I did encourage her to follow-up with the sleep medicine physician and I certainly understand her reticence to take Ambien particularly long-term and I suggested that she discuss alternative masks or the use of melatonin.    Loy Joshi, APRN CNP  12/12/19  10:00 AM      CC  Patient Care Team:  Anjel Busby MD as PCP - General (Family Practice)  Georgina Richardson MD as MD (Pulmonary)  Anjel Busyb MD as MD (Family Practice)  Anjel Chris MD as MD (Family Medicine - Sports Medicine)  Tashi Saxena MD as MD (Family Medicine - Sports Medicine)  Myrna Kim, RN as Nurse Coordinator (Bariatric)  Everett Austin MD as MD (Rheumatology)  Annette Flores, RN as Specialty Care Coordinator (Cardiology)  Anay Herbert, ELLIS as Specialty Care Coordinator (Cardiology)  Marques Gaspra MD as MD (Clinical Cardiac Electrophysiology)  ERIKA CARLOS

## 2019-12-12 NOTE — NURSING NOTE
Chief Complaint   Patient presents with     RECHECK     follow up after cardiac arrest 10-7-19     Timothy Ramirez CMA CMA at 7:48 AM on 12/12/2019

## 2019-12-12 NOTE — PATIENT INSTRUCTIONS
Patient Instructions:    It was a pleasure to see you in the cardiology clinic today.    If you have any questions you can reach our nurse triage line at [phone].  Press Option #1 for the [facility], then press Option #4 for nursing or Option #1 for scheduling. We also encourage the use of [employer] to communicate with your HealthCare Provider    Note new medications: none  Stop the following medications: no changes    Please follow up with Dr Valadez in 4 weeks.  Please have you bloodwork done today and schedule the echocardiogram.    Control your risk of coronary artery disease with these four lifestyle changes:  - Eating a heart healthy diet by following the American Heart Association Recommendations: Reduce saturated fat and trans fat to 5-6 percent of daily calories and minimizing the amount of trans fat you eat by limiting your intake of red meat and dairy products made with whole milk. It also means choosing skim milk, low-fat or fat-free dairy products, limiting fried food, and cooking with healthy oils such as vegetable oil. A healthy diet should include emphasis on fruits, vegetables, whole grains, poultry, fish and nuts, and limiting sugary foods and beverages. We recommend following the DASH (Dietary Approaches to Stop Hypertension) or Mediterranean Diet.  - Regular Exercise: Just 40 minutes of aerobic exercise of moderate to vigorous intensity done 3-4 times per week is enough to lower both cholesterol and high blood pressure. Brisk walking, swimming, bicycling or a dance class are examples.  - Avoiding Tobacco Smoking: Smoking compounds the risk from other risk factors for heart disease including high cholesterol, high blood pressure, and diabetes. Smokers can lower cholesterol, blood pressure, and protect their arteries by quitting. Ask to learn more about quitting smoking.  - Losing Weight: Being overweight or obese raises your risk of high cholesterol, high blood  pressure, and diabetes which are all risk factors for heart disease. Losing excess weight can improve cholesterol levels, blood pressure, and reduce incidence of diabetes and potentially reverse these disease processes.     Get help if you experience any of these heart attack warning signs: Although some heart attacks are sudden and intense, most start slowly, with mild pain or discomfort. Pay attention to your body -- and call 911 if you feel:  - Chest discomfort: Most heart attacks involve discomfort in the center of the chest that lasts more than a few minutes, or that goes away and comes back. It can feel like uncomfortable pressure, squeezing, fullness or pain.  - Discomfort in other areas of the upper body: Symptoms can include pain or discomfort in one or both arms, the back, neck, jaw or stomach.   - Shortness of breath with or without chest discomfort   - Other signs may include breaking out in a cold sweat, nausea or lightheadedness      Sincerely,    Loy Joshi, CNP

## 2019-12-12 NOTE — LETTER
12/12/2019      RE: Shira Rodriguez  [id number]  [place] [address]       Dear Colleague,    Thank you for the opportunity to participate in the care of your patient, Shira Rodriguez, at the [facility] at [facility]. Please see a copy of my visit note below.    Chief Complaint:   Chief Complaint   Patient presents with     RECHECK     follow up after cardiac arrest 10-7-19       HPI:    Ms. Rodriguez is a 59 year old female with a history of CYRUS, HTN, tobacco use disorder, who presents for a follow up appointment after PEA arrest secondary to massive PE. Today she endorses worsened dyspnea on exertion and a productive cough. She has multiple questions after her hospitalization, including concern about her borderline high pulse, especially when she goes to her PT appointments. She also expresses concern about pursuing a sleep study to assess her CYRUS being pharmacologically treated with Ambien.        PAST MEDICAL HISTORY:  Past Medical History:   Diagnosis Date     Chronic bronchitis (H) 07/30/2015     Contact dermatitis      Depressive disorder 1985     Eczema     HANDS     Elevated liver enzymes      H/O total knee replacement, left      History of total hip replacement 10/27/2011     Hyperlipidemia      Hypertension      Morbid obesity (H)      Osteoarthrosis     right knee     Sleep apnea      Tobacco use disorder        CURRENT MEDICATIONS:  Current Outpatient Medications   Medication Sig Dispense Refill     acetaminophen (TYLENOL) 325 MG tablet Take 2 tablets (650 mg) by mouth every 6 hours as needed for mild pain or fever       ARIPiprazole (ABILIFY) 5 MG tablet Take 1 tablet (5 mg) by mouth daily 90 tablet 3     atorvastatin (LIPITOR) 40 MG tablet Take 1 tablet (40 mg) by mouth every evening 90 tablet 3     DULoxetine (CYMBALTA) 60 MG capsule Take 1 capsule (60 mg) by mouth daily 30 capsule 0     folic acid (FOLVITE) 1 MG tablet Take 4  "tablets (4 mg) by mouth daily Take 3-4 tablets daily 120 tablet 0     furosemide (LASIX) 40 MG tablet Take 2 tablets (80 mg) by mouth daily 90 tablet 3     insulin syringe-needle U-100 (BD INSULIN SYRINGE ULTRAFINE) 30G X 1/2\" 1 ML miscellaneous For methotrexate use weekly 8 each prn     leucovorin (WELLCOVORIN) 5 MG tablet Take 1 tablet (5 mg) by mouth every 7 days *take 24h after taking weekly methotrexate dose 4 tablet 3     lisinopril (PRINIVIL/ZESTRIL) 5 MG tablet Take 1 tablet (5 mg) by mouth daily 90 tablet 3     methotrexate 50 MG/2ML injection Inject 1 mL (25 mg) Subcutaneous every 7 days 4 mL 3     nystatin (MYCOSTATIN) 241078 UNIT/GM external powder Apply to coat rash tid, neck and breast area rashes 60 g 3     potassium chloride ER (MICRO-K) 10 MEQ CR capsule Take 1 capsule (10 mEq) by mouth 2 times daily 180 capsule 3     zolpidem ER (AMBIEN CR) 6.25 MG CR tablet Take 1 tablet (6.25 mg) by mouth nightly as needed for sleep 1 tablet 0     warfarin ANTICOAGULANT (COUMADIN) 5 MG tablet Take 1.5 tablets daily or as directed. 50 tablet 1       PAST SURGICAL HISTORY:  Past Surgical History:   Procedure Laterality Date     ARTHROPLASTY KNEE  6/14/2012    Procedure: ARTHROPLASTY KNEE;  Left Total Knee Arthroplasty;  Surgeon: Annette Quesada MD;  Location: [facility] OR     ARTHROSCOPY HIP Right      BRONCHOSCOPY FLEXIBLE AND RIGID N/A 9/17/2019    Procedure: Bronchoscopy flexible;  Surgeon: Patrick Rayo MD;  Location: [facility] OR     C TOTAL HIP ARTHROPLASTY  07/09/04    RT     CV EXTRACORPERAL MEMBRANE OXYGENATION N/A 9/9/2019    Procedure: Extracorporeal Membrance Oxygenation;  Surgeon: Kaleb Mcfarlane MD;  Location:  [facility]ATH LAB     INSERT EXTRACORPORAL MEMBRANE OXYGENATOR N/A 9/17/2019    Procedure: Venous-Venous cannulation using ultrasound guidance and transesophageal echocardiogram, venous-arterial ecmo decannulation and repair of left femoral artery;  Surgeon: Patrick Rayo MD; "  Location: [facility] OR     IR [facility] THROMB PRIM ART, NON-INTRACRNL  9/10/2019     IR PULMONARY ANGIOGRAM BILATERAL  9/10/2019     IR THROMBOLYSIS ARTERIAL INFUSION INITIAL DAY  9/10/2019       ALLERGIES     Allergies   Allergen Reactions     Adhesive Tape Blisters     Erythromycin Rash       FAMILY HISTORY:  Family History   Problem Relation Age of Onset     Thyroid Disease Mother      Hypertension Mother      Skin Cancer Mother         MMohs surgery with skin graft     Cerebrovascular Disease Mother         CVA after endarderectomy     Diabetes Mother      Breast Cancer Paternal Grandmother      Pancreatic Cancer Paternal Grandmother      Cardiovascular Paternal Aunt      Cardiovascular Paternal Uncle      Depression Other      Alcoholism Father      Thyroid Disease Sister      Alcoholism Sister      Hypertension Maternal Grandmother      Heart Disease Sister         multiple ablations     Alcoholism Sister      Prostate Cancer Paternal Grandfather        SOCIAL HISTORY:  Social History     Socioeconomic History     Marital status: Single     Spouse name: None     Number of children: None     Years of education: None     Highest education level: None   Occupational History     Occupation: registered nurse     Comment: [employer]   Social Needs     Financial resource strain: None     Food insecurity:     Worry: None     Inability: None     Transportation needs:     Medical: None     Non-medical: None   Tobacco Use     Smoking status: Former Smoker     Packs/day: 1.00     Years: 33.00     Pack years: 33.00     Types: Cigarettes     Start date: 1982     Last attempt to quit: 2019     Years since quittin.2     Smokeless tobacco: Never Used     Tobacco comment: maybe   Substance and Sexual Activity     Alcohol use: Yes     Alcohol/week: 0.0 standard drinks     Comment: occassional     Drug use: No     Sexual activity: Never   Lifestyle     Physical activity:     Days per week: None     Minutes per session: None  "    Stress: None   Relationships     Social connections:     Talks on phone: None     Gets together: None     Attends Yarsani service: None     Active member of club or organization: None     Attends meetings of clubs or organizations: None     Relationship status: None     Intimate partner violence:     Fear of current or ex partner: None     Emotionally abused: None     Physically abused: None     Forced sexual activity: None   Other Topics Concern     Parent/sibling w/ CABG, MI or angioplasty before 65F 55M? Not Asked   Social History Narrative     None       ROS:    ROS:all other systems negative, other than the symptoms noted above in the HPI.     EXAM:  /83   Pulse 93   Resp 20   Ht 1.676 m (5' 6\")   Wt (!) 172.6 kg (380 lb 8 oz)   SpO2 94%   BMI 61.41 kg/m     GEN:patient is in no apparent distress.    HEENT: NC/AT.  Sclerae white, no incertus  Neck: No adenopathy.Carotids brisk bilaterally without bruits.  No jugular venous distension.   Heart:RRR. Normal S1, S2. No murmur, rub, click, or gallop.   Lungs: Lungs clear to ausculation bilaterally.  Diminished breath sounds in lower left lung. No ronchi, wheezes, rales.   Abdomen: Soft, nontender, nondistended.  Extremities: No clubbing, cyanosis, or edema.  The pulses are 2+ at the bilateral radial, DP, and PT  Neurologic: Awake and alert, normal speech, gait and affect  Skin: No petechiae, purpura or rash noted. Chronic venous stasis changes.    Labs:  LIPID RESULTS:  Lab Results   Component Value Date    CHOL 174 04/23/2019    HDL 51 04/23/2019    LDL 97 04/23/2019    TRIG 152 (H) 09/10/2019    CHOLHDLRATIO 4.5 06/01/2012       LIVER ENZYME RESULTS:  Lab Results   Component Value Date    AST 18 10/22/2019    ALT 27 10/22/2019       CBC RESULTS:  Lab Results   Component Value Date    WBC 7.0 10/22/2019    RBC 3.85 10/22/2019    HGB 11.6 (L) 10/22/2019    HCT 36.3 10/22/2019    MCV 94 10/22/2019    MCH 30.1 10/22/2019    MCHC 32.0 10/22/2019    " RDW 17.1 (H) 10/22/2019     10/22/2019       BMP RESULTS:  Lab Results   Component Value Date     11/20/2019    POTASSIUM 4.0 11/20/2019    CHLORIDE 107 11/20/2019    CO2 26 11/20/2019    ANIONGAP 7 11/20/2019     (H) 11/20/2019    BUN 11 11/20/2019    CR 0.64 11/20/2019    GFRESTIMATED >90 11/20/2019    GFRESTBLACK >90 11/20/2019    MARIA INES 9.7 11/20/2019        A1C RESULTS:  Lab Results   Component Value Date    A1C 5.6 09/10/2019       INR RESULTS:  Lab Results   Component Value Date    INR 2.57 (H) 12/10/2019    INR 2.02 (H) 12/02/2019         Assessment and Plan:   Dyspnea  Last echo was performed inpatient in September. Schedule updated TTE to assess heart function and EF. Obtain CBC to check for anemia as a cause of her dyspnea and tachycardia. Consider a VQ lung scan to r/o chronic VTE.      Pulmonary embolism with acute cor pulmonale, unspecified chronicity, unspecified pulmonary embolism type (H)  Cardiac arrest (H)  Patient has stopped smoking since being in the hospital. She is therapeutically anticoagulated on coumadin. She is compliant with her cardiac medications. Lipid levels are WNL. Discussed weight loss as a key component to reducing her risk for more PE events and cardiac disease.      Obstructive Sleep Apnea  Follow up with PCP and sleep medicine provider regarding recommendations for mask that is appropriate for patient and alternative treatments to Ambien to yield a deeper sleep, such as melatonin.       Follow-up: Return in about 4 weeks to see KEVIN Fish  12/12/19  8:58 AM    I was present with the NPP student who participated in the service and in the documentation of the services provided. I have verified the history and personally performed the physical exam and medical decision making, as documented by the student and edited by me.    Breath sounds slightly diminished at the left base right lung is clear to auscultation.  Regular rate and rhythm.   She reports exertional breathlessness that she believes has slightly worsened although she tells me it is not as bad as what she was having prior to being hospitalized.  From a volume perspective she appears to be euvolemic.  I'm concerned she may have developed CTEPH given her progressive dyspnea on exertion.  At this time I am going to update her echocardiogram and will check a CBC. Will also pursue a VQ scan to rule out chronic VTE.  If she has developed CTEPH will refer to Dr Mayen.  I did encourage her to follow-up with the sleep medicine physician and I certainly understand her reticence to take Ambien particularly long-term and I suggested that she discuss alternative masks or the use of melatonin.    Loy Joshi, [place] CNP  12/12/19  10:00 AM      CC  Patient Care Team:  Anjel Busby MD as PCP - General (Family Practice)  Georgina Richardson MD as MD (Pulmonary)  Anjel Busby MD as MD (Family Practice)  Anjel Chris MD as MD (Family Medicine - Sports Medicine)  Tashi Saxena MD as MD (Family Medicine - Sports Medicine)  Myrna Kim, RN as Nurse Coordinator (Bariatric)  Everett Austin MD as MD (Rheumatology)  Annette Flores, RN as Specialty Care Coordinator (Cardiology)  Anay Herbert, ELLIS as Specialty Care Coordinator (Cardiology)  Marques Gaspar MD as MD (Clinical Cardiac Electrophysiology)  ERIKA CARLOS

## 2019-12-16 DIAGNOSIS — I26.99 PULMONARY EMBOLI (H): Primary | ICD-10-CM

## 2019-12-17 ENCOUNTER — HOSPITAL ENCOUNTER (OUTPATIENT)
Dept: PHYSICAL THERAPY | Facility: CLINIC | Age: 59
Setting detail: THERAPIES SERIES
End: 2019-12-17
Attending: PHYSICAL MEDICINE & REHABILITATION
Payer: COMMERCIAL

## 2019-12-17 PROCEDURE — 97110 THERAPEUTIC EXERCISES: CPT | Mod: GP | Performed by: PHYSICAL THERAPIST

## 2019-12-18 ENCOUNTER — OFFICE VISIT (OUTPATIENT)
Dept: FAMILY MEDICINE | Facility: CLINIC | Age: 59
End: 2019-12-18
Payer: COMMERCIAL

## 2019-12-18 ENCOUNTER — ANTICOAGULATION THERAPY VISIT (OUTPATIENT)
Dept: ANTICOAGULATION | Facility: CLINIC | Age: 59
End: 2019-12-18

## 2019-12-18 VITALS
WEIGHT: 293 LBS | DIASTOLIC BLOOD PRESSURE: 77 MMHG | OXYGEN SATURATION: 94 % | HEART RATE: 86 BPM | BODY MASS INDEX: 61.25 KG/M2 | SYSTOLIC BLOOD PRESSURE: 130 MMHG | TEMPERATURE: 97.7 F

## 2019-12-18 DIAGNOSIS — I26.01 ACUTE SEPTIC PULMONARY EMBOLISM WITH ACUTE COR PULMONALE (H): ICD-10-CM

## 2019-12-18 DIAGNOSIS — I26.99 PULMONARY EMBOLISM, UNSPECIFIED CHRONICITY, UNSPECIFIED PULMONARY EMBOLISM TYPE, UNSPECIFIED WHETHER ACUTE COR PULMONALE PRESENT (H): ICD-10-CM

## 2019-12-18 DIAGNOSIS — Z79.899 POLYPHARMACY: ICD-10-CM

## 2019-12-18 DIAGNOSIS — L65.9 HAIR LOSS: Primary | ICD-10-CM

## 2019-12-18 DIAGNOSIS — L65.9 HAIR LOSS: ICD-10-CM

## 2019-12-18 DIAGNOSIS — R45.86 MOOD DISTURBANCE: ICD-10-CM

## 2019-12-18 LAB
FERRITIN SERPL-MCNC: 125 NG/ML (ref 8–252)
INR PPP: 1.62 (ref 0.86–1.14)
IRON SATN MFR SERPL: 17 % (ref 15–46)
IRON SERPL-MCNC: 46 UG/DL (ref 35–180)
TIBC SERPL-MCNC: 275 UG/DL (ref 240–430)
TSH SERPL DL<=0.005 MIU/L-ACNC: 1.95 MU/L (ref 0.4–4)

## 2019-12-18 ASSESSMENT — ANXIETY QUESTIONNAIRES
6. BECOMING EASILY ANNOYED OR IRRITABLE: NOT AT ALL
2. NOT BEING ABLE TO STOP OR CONTROL WORRYING: SEVERAL DAYS
7. FEELING AFRAID AS IF SOMETHING AWFUL MIGHT HAPPEN: NOT AT ALL
1. FEELING NERVOUS, ANXIOUS, OR ON EDGE: SEVERAL DAYS
5. BEING SO RESTLESS THAT IT IS HARD TO SIT STILL: NOT AT ALL
IF YOU CHECKED OFF ANY PROBLEMS ON THIS QUESTIONNAIRE, HOW DIFFICULT HAVE THESE PROBLEMS MADE IT FOR YOU TO DO YOUR WORK, TAKE CARE OF THINGS AT HOME, OR GET ALONG WITH OTHER PEOPLE: NOT DIFFICULT AT ALL
GAD7 TOTAL SCORE: 3
3. WORRYING TOO MUCH ABOUT DIFFERENT THINGS: SEVERAL DAYS

## 2019-12-18 ASSESSMENT — PAIN SCALES - GENERAL: PAINLEVEL: SEVERE PAIN (7)

## 2019-12-18 ASSESSMENT — PATIENT HEALTH QUESTIONNAIRE - PHQ9
SUM OF ALL RESPONSES TO PHQ QUESTIONS 1-9: 7
5. POOR APPETITE OR OVEREATING: NOT AT ALL

## 2019-12-18 NOTE — PATIENT INSTRUCTIONS
[facility] Medication Refill Request Information:  * Please contact your pharmacy regarding ANY request for medication refills.  ** [facility] Prescription Fax = [phone]  * Please allow 3 business days for routine medication refills.  * Please allow 5 business days for controlled substance medication refills.     [facility] Test Result notification information:  *You will be notified with in 7-10 days of your appointment day regarding the results of your test.  If you are on MyChart you will be notified as soon as the provider has reviewed the results and signed off on them.    [facility]: [phone]

## 2019-12-18 NOTE — PROGRESS NOTES
ANTICOAGULATION FOLLOW-UP CLINIC VISIT    Patient Name:  Shira Rodriguez  Date:  2019  Contact Type:  Telephone    SUBJECTIVE:  Patient Findings     Comments:   Spoke with Shira.  She doesn't think she missed a dose of warfarin.  She states she has not had changes in health, diet, or medications.  She reports no signs of bleeding or clotting.        Clinical Outcomes     Comments:   Spoke with Shira.  She doesn't think she missed a dose of warfarin.  She states she has not had changes in health, diet, or medications.  She reports no signs of bleeding or clotting.           OBJECTIVE    INR   Date Value Ref Range Status   2019 1.62 (H) 0.86 - 1.14 Final       ASSESSMENT / PLAN  INR assessment SUB    Recheck INR In: 5 DAYS    INR Location Clinic      Anticoagulation Summary  As of 2019    INR goal:   2.0-3.0   TTR:   71.9 % (1.9 mo)   INR used for dosin.62! (2019)   Warfarin maintenance plan:   7.5 mg (5 mg x 1.5) every day   Full warfarin instructions:   : 10 mg; Otherwise 7.5 mg every day   Weekly warfarin total:   52.5 mg   Plan last modified:   Mariah Sellers RN (11/15/2019)   Next INR check:   2019   Priority:   Maintenance   Target end date:       Indications    Pulmonary emboli (H) [I26.99]             Anticoagulation Episode Summary     INR check location:       Preferred lab:       Send INR reminders to:   [facility]    Comments:   HIPPA Forms returned 19  OK to leave messages on Cell  [phone]      Anticoagulation Care Providers     Provider Role Specialty Phone number    Anjel Busby MD [facility] [phone]            See the Encounter Report to view Anticoagulation Flowsheet and Dosing Calendar (Go to Encounters tab in chart review, and find the Anticoagulation Therapy Visit)    Spoke with Shira.  She might need one day a week of 10 mg of warfarin.  She will recheck INR in the AM on 19.      Tea REAGAN  Ish RN

## 2019-12-18 NOTE — PROGRESS NOTES
[facility]  Primary Care Center   Anjel Busby MD  12/18/2019      Chief Complaint:   Recheck Medication       History of Present Illness:   Shira Rodriguez is a 59 year old female with a history of cardiac arrest, pulmonary emboli, hypertension, hyperlipidemia, and rheumatoid arthritis who presents for follow up of obstructive sleep apnea and evaluation of hair loss.    Cardiology: She saw cardiology last week. Her PT wanted to her to talk to them about her shortness of breath and high pulse- it is usually above 100 before they even start any activity. She has an echo and VQ scan scheduled for next week. She has a follow-up cardiology appointment scheduled after this imaging.     Hair loss: When she runs her fingers through her hair, a lot of hair comes out. She has not noticed any thinning visually. She asks if hair loss is a side effect of Warfarin 7.5 mg daily.     Obstructive sleep apnea: She saw the sleep specialist Dr. Greenfield after her hospitalization, who suggested starting Ambien since she takes off her CPAP in her sleep. Because of her PE and PEA arrest, Dr. Greenfield thought it would be safer to do the Ambien trial during a sleep study, so it could be monitored. She is still concerned and does not want to trial it. Her cardiologist agrees and would like her discuss a different mask or alternative treatments such as Melatonin with Dr. Greenfield.      Other concerns discussed:  1. Her wound care is going well.      Review of Systems:   Pertinent items are noted in HPI or as in patient entered ROS below, remainder of complete ROS is negative.     Active Medications:     Current Outpatient Medications:      acetaminophen (TYLENOL) 325 MG tablet, Take 2 tablets (650 mg) by mouth every 6 hours as needed for mild pain or fever, Disp: , Rfl:      ARIPiprazole (ABILIFY) 5 MG tablet, Take 1 tablet (5 mg) by mouth daily, Disp: 90 tablet, Rfl: 3     atorvastatin (LIPITOR) 40 MG tablet, Take 1 tablet (40 mg) by mouth  "every evening, Disp: 90 tablet, Rfl: 3     DULoxetine (CYMBALTA) 60 MG capsule, Take 1 capsule (60 mg) by mouth daily, Disp: 30 capsule, Rfl: 0     folic acid (FOLVITE) 1 MG tablet, Take 4 tablets (4 mg) by mouth daily Take 3-4 tablets daily, Disp: 120 tablet, Rfl: 0     furosemide (LASIX) 40 MG tablet, Take 2 tablets (80 mg) by mouth daily, Disp: 90 tablet, Rfl: 3     insulin syringe-needle U-100 (BD INSULIN SYRINGE ULTRAFINE) 30G X 1/2\" 1 ML miscellaneous, For methotrexate use weekly, Disp: 8 each, Rfl: prn     leucovorin (WELLCOVORIN) 5 MG tablet, Take 1 tablet (5 mg) by mouth every 7 days *take 24h after taking weekly methotrexate dose, Disp: 4 tablet, Rfl: 3     lisinopril (PRINIVIL/ZESTRIL) 5 MG tablet, Take 1 tablet (5 mg) by mouth daily, Disp: 90 tablet, Rfl: 3     methotrexate 50 MG/2ML injection, Inject 1 mL (25 mg) Subcutaneous every 7 days, Disp: 4 mL, Rfl: 3     nystatin (MYCOSTATIN) 713893 UNIT/GM external powder, Apply to coat rash tid, neck and breast area rashes, Disp: 60 g, Rfl: 3     potassium chloride ER (MICRO-K) 10 MEQ CR capsule, Take 1 capsule (10 mEq) by mouth 2 times daily, Disp: 180 capsule, Rfl: 3     warfarin ANTICOAGULANT (COUMADIN) 5 MG tablet, Take 1.5 tablets daily or as directed., Disp: 50 tablet, Rfl: 1      Allergies:   Adhesive tape and Erythromycin      Past Medical History:  Chronic bronchitis  Contact dermatitis  Depressive disorder   Eczema  Elevated liver enzymes  Hyperlipidemia  Hypertension  Morbid obesity  Osteoarthritis  Sleep apnea  Degenerative arthritis of knee  Degeneration of cervical intervertebral disc  Pulmonary emboli  Rheumatoid arthritis  Cardiac arrest      Past Surgical History:  Left total knee arthroplasty: 6/2012  Right hip arthroscopy: 9/2019  Flexible bronchoscopy: 9/2019  Right total hip arthroplasty: 7/2004  Extracorporeal membrane oxygenation: 9/2019  Insert extracorporeal membrane oxygenator: 9/2019  [facility] thromb prima art, non-intracranial: " 9/2019  Pulmonary angiogram bilateral: 9/2019  Thrombolysis arterial infusion initial day: 9/2019    Family History:   Thyroid disease: mother, sister  Hypertension: mother, maternal grandmother  Skin cancer: mother  Cerebrovascular disease: mother  Diabetes: mother   Breast cancer: paternal grandmother  Pancreatic cancer: paternal grandmother  Alcoholism: father, sister, sister  Heart disease: sister  Prostate cancer: paternal grandfather       Social History:   Smoking status: former smoker, 33 pack years, quit 9/9/19  Alcohol use: occasional     Physical Exam:   /77 (BP Location: Right arm, Patient Position: Sitting, Cuff Size: Adult Regular)   Pulse 86   Temp 97.7  F (36.5  C) (Oral)   Wt (!) 172.1 kg (379 lb 8 oz)   SpO2 94%   BMI 61.25 kg/m     Constitutional: Alert. In no distress.  Head: The scalp, face, and head appear normal.  Musculoskeletal: Extremities appear normal. No gross deformities noted.   Neurologic: Gait normal. Speech is normal and fluent.  Psychiatric: Mentation appears normal. Normal affect.       Assessment and Plan:  Hair loss  If labs are normal, I will ask her to discuss this with pharmacy as to med side effects. It is also possible it is just from everything she has been through.   - TSH with free T4 reflex  - Iron and iron binding capacity  - Ferritin    Polypharmacy  - PHARMACY ([facility]) REFERRAL     Short of breath. Being worked up by cardiology.     I will see her in 2 months.     Mood disturbance on SNRI she is doing well  PHQ9 7  GAD7 3    Follow-up: No follow-ups on file.        Scribe Disclosure:  Marybel MAHARAJ, am serving as a scribe to document services personally performed by Anjel Busby MD at this visit, based upon the provider's statements to me. All documentation has been reviewed by the aforementioned provider prior to being entered into the official medical record.    Scribe Preparation Attestation:  Marybel MAHARAJ, a scribe, prepared the chart for  today's encounter.      Portions of this medical record were completed by a scribe. UPON MY REVIEW AND AUTHENTICATION BY ELECTRONIC SIGNATURE, this confirms (a) I performed the applicable clinical services, and (b) the record is accurate.   Anjel Busby MD MD

## 2019-12-18 NOTE — NURSING NOTE
Chief Complaint   Patient presents with     Recheck Medication     pt here to recheck medications       Oralia Washburn CMA at 10:43 AM on 12/18/2019.

## 2019-12-19 ASSESSMENT — ANXIETY QUESTIONNAIRES: GAD7 TOTAL SCORE: 3

## 2019-12-23 ENCOUNTER — HOSPITAL ENCOUNTER (OUTPATIENT)
Dept: GENERAL RADIOLOGY | Facility: CLINIC | Age: 59
Discharge: HOME | End: 2019-12-23
Attending: NURSE PRACTITIONER | Admitting: NURSE PRACTITIONER
Payer: COMMERCIAL

## 2019-12-23 ENCOUNTER — HOSPITAL ENCOUNTER (OUTPATIENT)
Dept: NUCLEAR MEDICINE | Facility: CLINIC | Age: 59
Setting detail: NUCLEAR MEDICINE
Discharge: HOME | End: 2019-12-23
Attending: NURSE PRACTITIONER | Admitting: NURSE PRACTITIONER
Payer: COMMERCIAL

## 2019-12-23 ENCOUNTER — ANCILLARY PROCEDURE (OUTPATIENT)
Dept: CARDIOLOGY | Facility: CLINIC | Age: 59
End: 2019-12-23
Attending: NURSE PRACTITIONER
Payer: COMMERCIAL

## 2019-12-23 DIAGNOSIS — I50.810 RVF (RIGHT VENTRICULAR FAILURE) (H): ICD-10-CM

## 2019-12-23 DIAGNOSIS — I26.99 OTHER ACUTE PULMONARY EMBOLISM WITHOUT ACUTE COR PULMONALE (H): ICD-10-CM

## 2019-12-23 PROCEDURE — 71046 X-RAY EXAM CHEST 2 VIEWS: CPT

## 2019-12-23 PROCEDURE — A9567 TECHNETIUM TC-99M AEROSOL: HCPCS | Performed by: NURSE PRACTITIONER

## 2019-12-23 PROCEDURE — 27210210 NM LUNG SCAN VENTILATION AND PERFUSION

## 2019-12-23 PROCEDURE — A9540 TC99M MAA: HCPCS | Performed by: NURSE PRACTITIONER

## 2019-12-23 PROCEDURE — 34300033 ZZH RX 343: Performed by: NURSE PRACTITIONER

## 2019-12-23 RX ADMIN — KIT FOR THE PREPARATION OF TECHNETIUM TC 99M ALBUMIN AGGREGATED 7 MILLICURIE: 2.5 INJECTION, POWDER, FOR SOLUTION INTRAVENOUS at 13:55

## 2019-12-23 RX ADMIN — Medication 6 ML: at 15:15

## 2019-12-23 RX ADMIN — KIT FOR THE PREPARATION OF TECHNETIUM TC 99M PENTETATE 2 MILLICURIE: 20 INJECTION, POWDER, LYOPHILIZED, FOR SOLUTION INTRAVENOUS; RESPIRATORY (INHALATION) at 13:54

## 2019-12-23 NOTE — TELEPHONE ENCOUNTER
DIAGNOSIS: Rt Knee Pain, Pt Requesting Injection, Pt seen with Dr. Saxena but Requesting to be seen Sooner, Imaging and records in System, Per Pt   APPOINTMENT DATE: 12/23   NOTES STATUS DETAILS   OFFICE NOTE from referring provider N/A    OFFICE NOTE from other specialist Internal    DISCHARGE SUMMARY from hospital N/A    DISCHARGE REPORT from the ER N/A    OPERATIVE REPORT N/A    MEDICATION LIST Internal    MRI n/a    ultrasound Internal    XRAYS (IMAGES & REPORTS) Internal

## 2019-12-27 DIAGNOSIS — M25.561 RIGHT KNEE PAIN, UNSPECIFIED CHRONICITY: Primary | ICD-10-CM

## 2019-12-30 NOTE — PROGRESS NOTES
Pt is a 59 year old female here today for:     Right Knee pain: most bothersome when walking. Pain is medial and posterior   Duration? Multiple Years   Injury/ Inciting activity? None   Pop? None   Swelling? None   Limited motion? None   Locking/ Catching? None   Giving way/ instability? None   Imaging? X-rays today   Treatment? Has had injections in Rheumatology, last one about a year ago  Meds: tylenol; unable to take nsaids due to being on coumadin  PT: is going but is limited due to pain    L knee replacement in 2012 by Dr Roberto       Past Medical History:   Diagnosis Date     Chronic bronchitis (H) 07/30/2015     Contact dermatitis      Depressive disorder 1985     Eczema     HANDS     Elevated liver enzymes      H/O total knee replacement, left      History of total hip replacement 10/27/2011     Hyperlipidemia      Hypertension      Morbid obesity (H)      Osteoarthrosis     right knee     Sleep apnea      Tobacco use disorder       Past Surgical History:   Procedure Laterality Date     ARTHROPLASTY KNEE  6/14/2012    Procedure: ARTHROPLASTY KNEE;  Left Total Knee Arthroplasty;  Surgeon: Annette Quesada MD;  [facility]     ARTHROSCOPY HIP Right      BRONCHOSCOPY FLEXIBLE AND RIGID N/A 9/17/2019    Procedure: Bronchoscopy flexible;  Surgeon: Patrick Rayo MD;  [facility]     C TOTAL HIP ARTHROPLASTY  07/09/04    RT     CV EXTRACORPERAL MEMBRANE OXYGENATION N/A 9/9/2019    Procedure: Extracorporeal Membrance Oxygenation;  Surgeon: Kaleb Mcfarlane MD;  [facility]     INSERT EXTRACORPORAL MEMBRANE OXYGENATOR N/A 9/17/2019    Procedure: Venous-Venous cannulation using ultrasound guidance and transesophageal echocardiogram, venous-arterial ecmo decannulation and repair of left femoral artery;  Surgeon: Patrick Rayo MD;  [facility]     IR MECH THROMB PRIM ART, NON-INTRACRNL  9/10/2019     IR PULMONARY ANGIOGRAM BILATERAL  9/10/2019     IR  "THROMBOLYSIS ARTERIAL INFUSION INITIAL DAY  9/10/2019      Current Outpatient Medications   Medication Sig Dispense Refill     acetaminophen (TYLENOL) 325 MG tablet Take 2 tablets (650 mg) by mouth every 6 hours as needed for mild pain or fever       ARIPiprazole (ABILIFY) 5 MG tablet Take 1 tablet (5 mg) by mouth daily 90 tablet 3     atorvastatin (LIPITOR) 40 MG tablet Take 1 tablet (40 mg) by mouth every evening 90 tablet 3     DULoxetine (CYMBALTA) 60 MG capsule Take 1 capsule (60 mg) by mouth daily 30 capsule 0     folic acid (FOLVITE) 1 MG tablet Take 4 tablets (4 mg) by mouth daily Take 3-4 tablets daily 120 tablet 0     furosemide (LASIX) 40 MG tablet Take 2 tablets (80 mg) by mouth daily 90 tablet 3     insulin syringe-needle U-100 (BD INSULIN SYRINGE ULTRAFINE) 30G X 1/2\" 1 ML miscellaneous For methotrexate use weekly 8 each prn     leucovorin (WELLCOVORIN) 5 MG tablet Take 1 tablet (5 mg) by mouth every 7 days *take 24h after taking weekly methotrexate dose 4 tablet 3     lisinopril (PRINIVIL/ZESTRIL) 5 MG tablet Take 1 tablet (5 mg) by mouth daily 90 tablet 3     methotrexate 50 MG/2ML injection Inject 1 mL (25 mg) Subcutaneous every 7 days 4 mL 3     nystatin (MYCOSTATIN) 154799 UNIT/GM external powder Apply to coat rash tid, neck and breast area rashes 60 g 3     potassium chloride ER (MICRO-K) 10 MEQ CR capsule Take 1 capsule (10 mEq) by mouth 2 times daily 180 capsule 3     warfarin ANTICOAGULANT (COUMADIN) 5 MG tablet Take 1.5 tablets daily or as directed. 50 tablet 1      Allergies   Allergen Reactions     Adhesive Tape Blisters     Erythromycin Rash      Social History     Tobacco Use     Smoking status: Former Smoker     Packs/day: 1.00     Years: 33.00     Pack years: 33.00     Types: Cigarettes     Start date: 1982     Last attempt to quit: 2019     Years since quittin.3     Smokeless tobacco: Never Used     Tobacco comment: maybe   Substance Use Topics     Alcohol use: Yes     " "Alcohol/week: 0.0 standard drinks     Comment: occassional     Drug use: No      Family History   Problem Relation Age of Onset     Thyroid Disease Mother      Hypertension Mother      Skin Cancer Mother         MMohs surgery with skin graft     Cerebrovascular Disease Mother         CVA after endarderectomy     Diabetes Mother      Breast Cancer Paternal Grandmother      Pancreatic Cancer Paternal Grandmother      Cardiovascular Paternal Aunt      Cardiovascular Paternal Uncle      Depression Other      Alcoholism Father      Thyroid Disease Sister      Alcoholism Sister      Hypertension Maternal Grandmother      Heart Disease Sister         multiple ablations     Alcoholism Sister      Prostate Cancer Paternal Grandfather       ROS:   Gen- no fevers/chills   Rheum - no morning stiffness   Derm - no rash/ redness   Neuro - no numbness, no tingling   Remainder of ROS negative.     Exam:   Ht 1.676 m (5' 6\")   Wt (!) 171 kg (377 lb)   BMI 60.85 kg/m       R Knee:   ROM: 0-120; Crepitus: YES   Effusion: mild ; Swelling: YES   Strength: Full in flexion/ extension   Tenderness: Patella - No Medial joint line - NO; Lateral joint line - NO; Quad tendon - NO; Patellar tendon- No; Hamstring - YES - lateral.   Patella: patellar compression - neg  Meniscus: Avelina - deferred      Xray knee - 12/31/19 at [facility]    Severe tricompartmental OA      Procedure Note:  The pt was verbally consented. The R knee was sterilely prepped in usual fashion. 1cc of 40mg/mL Kenalog and 4 cc of 1% lidocaine was injected via lateral approach without complication. Pt tolerated procedure well.         Large Joint Injection/Arthocentesis: R knee joint  Date/Time: 12/31/2019 10:30 AM  Performed by: Ken Modi MD  Authorized by: Ken Modi MD     Needle Size:  22 G  Guidance: landmark guided    Approach:  Anterolateral  Location:  Knee      Medications:  40 mg triamcinolone 40 MG/ML; 4 mL lidocaine (PF) 1 %  Outcome:  Tolerated well, no " immediate complications  Procedure discussed: discussed risks, benefits, and alternatives    Consent Given by:  Patient  Timeout: timeout called immediately prior to procedure    Prep: patient was prepped and draped in usual sterile fashion     Scribed by Marva Sparks ATC for Dr. Modi on 12/31/19 at 10:30AM, based on the provider s statements to me.        (M17.11) Primary osteoarthritis of right knee  (primary encounter diagnosis)  Comment: cortisone injection today; will cont PT; f/u prn; would consider viscos if relief from cortisone is short-lived; precautions/ anticipatory guidance given  Plan: Large Joint Injection/Arthocentesis: R knee joint            Ken Modi MD  December 31, 2019  10:41 AM

## 2019-12-31 ENCOUNTER — PRE VISIT (OUTPATIENT)
Dept: ORTHOPEDICS | Facility: CLINIC | Age: 59
End: 2019-12-31

## 2019-12-31 ENCOUNTER — OFFICE VISIT (OUTPATIENT)
Dept: ORTHOPEDICS | Facility: CLINIC | Age: 59
End: 2019-12-31
Payer: COMMERCIAL

## 2019-12-31 ENCOUNTER — ANCILLARY PROCEDURE (OUTPATIENT)
Dept: GENERAL RADIOLOGY | Facility: CLINIC | Age: 59
End: 2019-12-31
Attending: FAMILY MEDICINE
Payer: COMMERCIAL

## 2019-12-31 VITALS — WEIGHT: 293 LBS | HEIGHT: 66 IN | BODY MASS INDEX: 47.09 KG/M2

## 2019-12-31 DIAGNOSIS — M25.561 RIGHT KNEE PAIN, UNSPECIFIED CHRONICITY: ICD-10-CM

## 2019-12-31 DIAGNOSIS — M17.11 PRIMARY OSTEOARTHRITIS OF RIGHT KNEE: Primary | ICD-10-CM

## 2019-12-31 RX ORDER — LIDOCAINE HYDROCHLORIDE 10 MG/ML
4 INJECTION, SOLUTION EPIDURAL; INFILTRATION; INTRACAUDAL; PERINEURAL
Status: DISCONTINUED | OUTPATIENT
Start: 2019-12-31 | End: 2021-06-16

## 2019-12-31 RX ORDER — TRIAMCINOLONE ACETONIDE 40 MG/ML
40 INJECTION, SUSPENSION INTRA-ARTICULAR; INTRAMUSCULAR
Status: DISCONTINUED | OUTPATIENT
Start: 2019-12-31 | End: 2021-06-16

## 2019-12-31 RX ADMIN — TRIAMCINOLONE ACETONIDE 40 MG: 40 INJECTION, SUSPENSION INTRA-ARTICULAR; INTRAMUSCULAR at 10:30

## 2019-12-31 RX ADMIN — LIDOCAINE HYDROCHLORIDE 4 ML: 10 INJECTION, SOLUTION EPIDURAL; INFILTRATION; INTRACAUDAL; PERINEURAL at 10:30

## 2019-12-31 ASSESSMENT — MIFFLIN-ST. JEOR: SCORE: 2306.81

## 2019-12-31 NOTE — NURSING NOTE
[address] [address]  Dept: [phone]  ______________________________________________________________________________    Patient: Shira Rodriguez   : 1960   MRN: [id number]   2019    INVASIVE PROCEDURE SAFETY CHECKLIST    Date: 19   Procedure: Right knee CSI with Kenalog  Patient Name: Shira Rodriguez  MRN: [id number]  YOB: 1960    Action: Complete sections as appropriate. Any discrepancy results in a HARD COPY until resolved.     PRE PROCEDURE:  Patient ID verified with 2 identifiers (name and  or MRN): Yes  Procedure and site verified with patient/designee (when able): Yes  Accurate consent documentation in medical record: Yes  H&P (or appropriate assessment) documented in medical record: Yes  H&P must be up to 20 days prior to procedure and updates within 24 hours of procedure as applicable: NA  Relevant diagnostic and radiology test results appropriately labeled and displayed as applicable: Yes  Procedure site(s) marked with provider initials: NA    TIMEOUT:  Time-Out performed immediately prior to starting procedure, including verbal and active participation of all team members addressing the following:Yes  * Correct patient identify  * Confirmed that the correct side and site are marked  * An accurate procedure consent form  * Agreement on the procedure to be done  * Correct patient position  * Relevant images and results are properly labeled and appropriately displayed  * The need to administer antibiotics or fluids for irrigation purposes during the procedure as applicable   * Safety precautions based on patient history or medication use    DURING PROCEDURE: Verification of correct person, site, and procedures any time the responsibility for care of the patient is transferred to another member of the care team.       Prior to injection, verified patient identity using patient's name and date of  birth.  Due to injection administration, patient instructed to remain in clinic for 15 minutes  afterwards, and to report any adverse reaction to me immediately.    Joint injection was performed.      Drug Amount Wasted:  1 of 5mg Lidocaine wasted  Vial/Syringe: Single dose vial  Expiration Date:  1/23      Marva Sparks, [facility]  December 31, 2019

## 2019-12-31 NOTE — LETTER
12/31/2019      RE: Shira Rodriguez  [id number]  [place] [address]       Pt is a 59 year old female here today for:     Right Knee pain: most bothersome when walking. Pain is medial and posterior   Duration? Multiple Years   Injury/ Inciting activity? None   Pop? None   Swelling? None   Limited motion? None   Locking/ Catching? None   Giving way/ instability? None   Imaging? X-rays today   Treatment? Has had injections in Rheumatology, last one about a year ago  Meds: tylenol; unable to take nsaids due to being on coumadin  PT: is going but is limited due to pain    L knee replacement in 2012 by Dr Roberto       Past Medical History:   Diagnosis Date     Chronic bronchitis (H) 07/30/2015     Contact dermatitis      Depressive disorder 1985     Eczema     HANDS     Elevated liver enzymes      H/O total knee replacement, left      History of total hip replacement 10/27/2011     Hyperlipidemia      Hypertension      Morbid obesity (H)      Osteoarthrosis     right knee     Sleep apnea      Tobacco use disorder       Past Surgical History:   Procedure Laterality Date     ARTHROPLASTY KNEE  6/14/2012    Procedure: ARTHROPLASTY KNEE;  Left Total Knee Arthroplasty;  Surgeon: Annette Quesada MD;  [facility]     ARTHROSCOPY HIP Right      BRONCHOSCOPY FLEXIBLE AND RIGID N/A 9/17/2019    Procedure: Bronchoscopy flexible;  Surgeon: Patrick Rayo MD;  Location:  OR      TOTAL HIP ARTHROPLASTY  07/09/04    RT     CV EXTRACORPERAL MEMBRANE OXYGENATION N/A 9/9/2019    Procedure: Extracorporeal Membrance Oxygenation;  Surgeon: Kaleb Mcfarlane MD;  Location:  [facility]     INSERT EXTRACORPORAL MEMBRANE OXYGENATOR N/A 9/17/2019    Procedure: Venous-Venous cannulation using ultrasound guidance and transesophageal echocardiogram, venous-arterial ecmo decannulation and repair of left femoral artery;  Surgeon: Patrick Rayo MD;  Location:  OR     [place]  "THROMB PRIM ART, NON-INTRACRNL  9/10/2019     IR PULMONARY ANGIOGRAM BILATERAL  9/10/2019     IR THROMBOLYSIS ARTERIAL INFUSION INITIAL DAY  9/10/2019      Current Outpatient Medications   Medication Sig Dispense Refill     acetaminophen (TYLENOL) 325 MG tablet Take 2 tablets (650 mg) by mouth every 6 hours as needed for mild pain or fever       ARIPiprazole (ABILIFY) 5 MG tablet Take 1 tablet (5 mg) by mouth daily 90 tablet 3     atorvastatin (LIPITOR) 40 MG tablet Take 1 tablet (40 mg) by mouth every evening 90 tablet 3     DULoxetine (CYMBALTA) 60 MG capsule Take 1 capsule (60 mg) by mouth daily 30 capsule 0     folic acid (FOLVITE) 1 MG tablet Take 4 tablets (4 mg) by mouth daily Take 3-4 tablets daily 120 tablet 0     furosemide (LASIX) 40 MG tablet Take 2 tablets (80 mg) by mouth daily 90 tablet 3     insulin syringe-needle U-100 (BD INSULIN SYRINGE ULTRAFINE) 30G X 1/2\" 1 ML miscellaneous For methotrexate use weekly 8 each prn     leucovorin (WELLCOVORIN) 5 MG tablet Take 1 tablet (5 mg) by mouth every 7 days *take 24h after taking weekly methotrexate dose 4 tablet 3     lisinopril (PRINIVIL/ZESTRIL) 5 MG tablet Take 1 tablet (5 mg) by mouth daily 90 tablet 3     methotrexate 50 MG/2ML injection Inject 1 mL (25 mg) Subcutaneous every 7 days 4 mL 3     nystatin (MYCOSTATIN) 527665 UNIT/GM external powder Apply to coat rash tid, neck and breast area rashes 60 g 3     potassium chloride ER (MICRO-K) 10 MEQ CR capsule Take 1 capsule (10 mEq) by mouth 2 times daily 180 capsule 3     warfarin ANTICOAGULANT (COUMADIN) 5 MG tablet Take 1.5 tablets daily or as directed. 50 tablet 1      Allergies   Allergen Reactions     Adhesive Tape Blisters     Erythromycin Rash      Social History     Tobacco Use     Smoking status: Former Smoker     Packs/day: 1.00     Years: 33.00     Pack years: 33.00     Types: Cigarettes     Start date: 1982     Last attempt to quit: 2019     Years since quittin.3     Smokeless " "tobacco: Never Used     Tobacco comment: maybe   Substance Use Topics     Alcohol use: Yes     Alcohol/week: 0.0 standard drinks     Comment: occassional     Drug use: No      Family History   Problem Relation Age of Onset     Thyroid Disease Mother      Hypertension Mother      Skin Cancer Mother         MMohs surgery with skin graft     Cerebrovascular Disease Mother         CVA after endarderectomy     Diabetes Mother      Breast Cancer Paternal Grandmother      Pancreatic Cancer Paternal Grandmother      Cardiovascular Paternal Aunt      Cardiovascular Paternal Uncle      Depression Other      Alcoholism Father      Thyroid Disease Sister      Alcoholism Sister      Hypertension Maternal Grandmother      Heart Disease Sister         multiple ablations     Alcoholism Sister      Prostate Cancer Paternal Grandfather       ROS:   Gen- no fevers/chills   Rheum - no morning stiffness   Derm - no rash/ redness   Neuro - no numbness, no tingling   Remainder of ROS negative.     Exam:   Ht 1.676 m (5' 6\")   Wt (!) 171 kg (377 lb)   BMI 60.85 kg/m        R Knee:   ROM: 0-120; Crepitus: YES   Effusion: mild ; Swelling: YES   Strength: Full in flexion/ extension   Tenderness: Patella - No Medial joint line - NO; Lateral joint line - NO; Quad tendon - NO; Patellar tendon- No; Hamstring - YES - lateral.   Patella: patellar compression - neg  Meniscus: Avelina - deferred      Xray knee - 12/31/19 at [facility] [place]    Severe tricompartmental OA      Procedure Note:  The pt was verbally consented. The R knee was sterilely prepped in usual fashion. 1cc of 40mg/mL Kenalog and 4 cc of 1% lidocaine was injected via lateral approach without complication. Pt tolerated procedure well.         Large Joint Injection/Arthocentesis: R knee joint  Date/Time: 12/31/2019 10:30 AM  Performed by: Ken Modi MD  Authorized by: Ken Modi MD     Needle Size:  22 G  Guidance: landmark guided    Approach:  Anterolateral  Location:  " Knee      Medications:  40 mg triamcinolone 40 MG/ML; 4 mL lidocaine (PF) 1 %  Outcome:  Tolerated well, no immediate complications  Procedure discussed: discussed risks, benefits, and alternatives    Consent Given by:  Patient  Timeout: timeout called immediately prior to procedure    Prep: patient was prepped and draped in usual sterile fashion     Scribed by Marva Sparks ATC for Dr. Modi on 12/31/19 at 10:30AM, based on the provider s statements to me.        (M17.11) Primary osteoarthritis of right knee  (primary encounter diagnosis)  Comment: cortisone injection today; will cont PT; f/u prn; would consider viscos if relief from cortisone is short-lived; precautions/ anticipatory guidance given  Plan: Large Joint Injection/Arthocentesis: R knee joint            Ken Modi MD  December 31, 2019  10:41 AM      Ken Modi MD

## 2020-01-01 ASSESSMENT — ENCOUNTER SYMPTOMS
WEIGHT LOSS: 0
WEIGHT GAIN: 0
HALLUCINATIONS: 0
SLEEP DISTURBANCES DUE TO BREATHING: 0
CHILLS: 0
ARTHRALGIAS: 1
SNORES LOUDLY: 0
NECK PAIN: 0
SHORTNESS OF BREATH: 1
DECREASED CONCENTRATION: 0
MUSCLE WEAKNESS: 0
POLYPHAGIA: 0
DECREASED APPETITE: 0
LEG PAIN: 0
NERVOUS/ANXIOUS: 0
SWOLLEN GLANDS: 0
ORTHOPNEA: 1
JOINT SWELLING: 0
BRUISES/BLEEDS EASILY: 1
EXERCISE INTOLERANCE: 0
FEVER: 0
STIFFNESS: 1
COUGH: 1
LIGHT-HEADEDNESS: 0
MUSCLE CRAMPS: 0
HEMOPTYSIS: 0
INSOMNIA: 1
SYNCOPE: 0
NIGHT SWEATS: 0
PANIC: 0
COUGH DISTURBING SLEEP: 0
HYPERTENSION: 0
FATIGUE: 1
POLYDIPSIA: 0
DEPRESSION: 1
SPUTUM PRODUCTION: 1
DYSPNEA ON EXERTION: 0
INCREASED ENERGY: 0
MYALGIAS: 1
HYPOTENSION: 0
BACK PAIN: 1
PALPITATIONS: 1
WHEEZING: 0
ALTERED TEMPERATURE REGULATION: 0

## 2020-01-02 ENCOUNTER — OFFICE VISIT (OUTPATIENT)
Dept: WOUND CARE | Facility: CLINIC | Age: 60
End: 2020-01-02
Payer: COMMERCIAL

## 2020-01-02 DIAGNOSIS — R06.02 SHORTNESS OF BREATH: Primary | ICD-10-CM

## 2020-01-02 DIAGNOSIS — S31.109D WOUND OF LEFT GROIN, SUBSEQUENT ENCOUNTER: Primary | ICD-10-CM

## 2020-01-02 DIAGNOSIS — Z86.711 HISTORY OF PULMONARY EMBOLISM: ICD-10-CM

## 2020-01-02 DIAGNOSIS — R93.89 ABNORMAL CHEST CT: ICD-10-CM

## 2020-01-02 NOTE — PROGRESS NOTES
Patient comes to wound clinic for wound assessment per request of dr. Cardona. She has history of a Open surgical wound due to ECMO : DISCHARGE DIAGNOSIS Pulmonary EmbolismPEA ArrestBilateral pleural effusionsVolume overloadSinus pauseHTN Sleep apnea  Left groin wound Pannus wound Clean gloves are donned and current dressing removed. Previous treatment includes: Polymem    First date of treatment: 10/18/19    Objective findings: improved    Location: left  groin      Open Post-operative wound: Yes    Wound measured.: 1.5 cm x 0.7 cm x 0.1 cm , Stage: Stage 3: Full thickness skin loss with subcutaneous involvement     Wound description: no sign of infection    Tenderness:mildly    Odor: none    Drainage is moderate, serosanguinous     Tunneling:  N/A      Undermining: N/A    Wound Base: granulation and slough     Subjective finding:     Pt states: doing well    Patient is assessed for discomfort which is: minimal    Today's status of the wound: healing    Treatment: Removal of existing dressing, Visual inspection, Cleansing with Microklenz spray,, Wound packing with HydroferaBlue dressing   Application of clean dressing.  Pt received the following instructions:  Continue with current cares: HydroferaBlue dressing . Vaseline to the wound edges  Cleansing with soap and water or Microklenz spray,   Secondary Dressing: gauze and tape  Secure with: tape. Frequency: EOD or daily   Signs and symptoms of infection taught.  Patient needs to be seen 2 weeks. Treated and follow-up appointment made. Annette Smart was available for supervision of care if needed or if questions should arise and regarding plan of care.  Margot Dye RN, CWON

## 2020-01-03 ENCOUNTER — ANTICOAGULATION THERAPY VISIT (OUTPATIENT)
Dept: ANTICOAGULATION | Facility: CLINIC | Age: 60
End: 2020-01-03

## 2020-01-03 DIAGNOSIS — I26.99 PULMONARY EMBOLISM, UNSPECIFIED CHRONICITY, UNSPECIFIED PULMONARY EMBOLISM TYPE, UNSPECIFIED WHETHER ACUTE COR PULMONALE PRESENT (H): ICD-10-CM

## 2020-01-03 DIAGNOSIS — I26.01 ACUTE SEPTIC PULMONARY EMBOLISM WITH ACUTE COR PULMONALE (H): ICD-10-CM

## 2020-01-03 LAB
CAPILLARY BLOOD COLLECTION: NORMAL
INR BLD: 1.9 (ref 0.86–1.14)

## 2020-01-03 PROCEDURE — 36416 COLLJ CAPILLARY BLOOD SPEC: CPT | Performed by: FAMILY MEDICINE

## 2020-01-03 PROCEDURE — 85610 PROTHROMBIN TIME: CPT | Mod: QW | Performed by: FAMILY MEDICINE

## 2020-01-03 NOTE — PROGRESS NOTES
ANTICOAGULATION FOLLOW-UP CLINIC VISIT    Patient Name:  Shira Rodriguez  Date:  1/3/2020  Contact Type:  Telephone    SUBJECTIVE:  Patient Findings     Positives:   Change in medications (increase in lasix dose)             OBJECTIVE    INR Point of Care   Date Value Ref Range Status   2020 1.9 (H) 0.86 - 1.14 Final     Comment:     This test is intended for monitoring Coumadin therapy.  Results are not   accurate in patients with prolonged INR due to factor deficiency.         ASSESSMENT / PLAN  INR assessment SUB    Recheck INR In: 6 DAYS    INR Location Clinic      Anticoagulation Summary  As of 1/3/2020    INR goal:   2.0-3.0   TTR:   56.5 % (2.5 mo)   INR used for dosin.9! (1/3/2020)   Warfarin maintenance plan:   10 mg (5 mg x 2) every Fri; 7.5 mg (5 mg x 1.5) all other days   Full warfarin instructions:   10 mg every Fri; 7.5 mg all other days   Weekly warfarin total:   55 mg   Plan last modified:   Tea Deng RN (1/3/2020)   Next INR check:      Priority:   High   Target end date:       Indications    Pulmonary emboli (H) [I26.99]             Anticoagulation Episode Summary     INR check location:       Preferred lab:       Send INR reminders to:   [facility]    Comments:   HIPPA Forms returned 19  OK to leave messages on Cell  [phone]      Anticoagulation Care Providers     Provider Role Specialty Phone number    Anjel Busby MD [facility] [phone]            See the Encounter Report to view Anticoagulation Flowsheet and Dosing Calendar (Go to Encounters tab in chart review, and find the Anticoagulation Therapy Visit)    Spoke with Shira.  Recommended increasing warfarin dose to 10 mg F and 7.5 mg all other days of the week.    Tea Deng, ELLIS

## 2020-01-06 ENCOUNTER — OFFICE VISIT (OUTPATIENT)
Dept: CARDIOLOGY | Facility: CLINIC | Age: 60
End: 2020-01-06
Attending: NURSE PRACTITIONER
Payer: COMMERCIAL

## 2020-01-06 VITALS
WEIGHT: 293 LBS | BODY MASS INDEX: 47.09 KG/M2 | DIASTOLIC BLOOD PRESSURE: 81 MMHG | HEIGHT: 66 IN | SYSTOLIC BLOOD PRESSURE: 117 MMHG | HEART RATE: 97 BPM

## 2020-01-06 DIAGNOSIS — G47.33 OSA (OBSTRUCTIVE SLEEP APNEA): ICD-10-CM

## 2020-01-06 DIAGNOSIS — I27.82 CHRONIC SADDLE PULMONARY EMBOLISM WITHOUT ACUTE COR PULMONALE (H): ICD-10-CM

## 2020-01-06 DIAGNOSIS — I26.92 CHRONIC SADDLE PULMONARY EMBOLISM WITHOUT ACUTE COR PULMONALE (H): ICD-10-CM

## 2020-01-06 DIAGNOSIS — Z86.74 HISTORY OF CARDIAC ARREST: Primary | ICD-10-CM

## 2020-01-06 PROCEDURE — G0463 HOSPITAL OUTPT CLINIC VISIT: HCPCS | Mod: 25,ZF

## 2020-01-06 PROCEDURE — 99214 OFFICE O/P EST MOD 30 MIN: CPT | Mod: GC | Performed by: INTERNAL MEDICINE

## 2020-01-06 PROCEDURE — 93005 ELECTROCARDIOGRAM TRACING: CPT | Mod: ZF

## 2020-01-06 PROCEDURE — 93010 ELECTROCARDIOGRAM REPORT: CPT | Mod: ZP | Performed by: INTERNAL MEDICINE

## 2020-01-06 ASSESSMENT — PAIN SCALES - GENERAL: PAINLEVEL: NO PAIN (0)

## 2020-01-06 ASSESSMENT — MIFFLIN-ST. JEOR: SCORE: 2267.8

## 2020-01-06 ASSESSMENT — PATIENT HEALTH QUESTIONNAIRE - PHQ9: SUM OF ALL RESPONSES TO PHQ QUESTIONS 1-9: 9

## 2020-01-06 NOTE — PROGRESS NOTES
"HPI:     60 y/o F with PMH of massive PE with RV failure resulting in PEA arrest s/p VV ecmo cannulation, sinus pauses, CYRUS, HTN, COPD, rheumatoid arthritis, smoking afib (likely in the setting of dobutamine infusion) who presents for f/u for tachycardia.     She had TTE ordered which showed preserved EF, normal RV function.  She had a V/Q scan that showed reduced ventilation on the left involving the left lower lobe, lingula, and anterior segment of the  left upper lobe.  The perfusion study demonstrates matching changes within the left lung as compared to ventilation. There are no segmental or subsegmental  abnormalities within the lungs to indicate pulmonary emboli.    Patient is adherent on warfarin.         PAST MEDICAL HISTORY:  Past Medical History:   Diagnosis Date     Chronic bronchitis (H) 07/30/2015     Contact dermatitis      Depressive disorder 1985     Eczema     HANDS     Elevated liver enzymes      H/O total knee replacement, left      History of total hip replacement 10/27/2011     Hyperlipidemia      Hypertension      Morbid obesity (H)      Osteoarthrosis     right knee     Sleep apnea      Tobacco use disorder        CURRENT MEDICATIONS:  Current Outpatient Medications   Medication Sig Dispense Refill     acetaminophen (TYLENOL) 325 MG tablet Take 2 tablets (650 mg) by mouth every 6 hours as needed for mild pain or fever       ARIPiprazole (ABILIFY) 5 MG tablet Take 1 tablet (5 mg) by mouth daily 90 tablet 3     atorvastatin (LIPITOR) 40 MG tablet Take 1 tablet (40 mg) by mouth every evening 90 tablet 3     DULoxetine (CYMBALTA) 60 MG capsule Take 1 capsule (60 mg) by mouth daily 30 capsule 0     folic acid (FOLVITE) 1 MG tablet Take 4 tablets (4 mg) by mouth daily Take 3-4 tablets daily 120 tablet 0     furosemide (LASIX) 40 MG tablet Take 2 tablets (80 mg) by mouth daily 90 tablet 3     insulin syringe-needle U-100 (BD INSULIN SYRINGE ULTRAFINE) 30G X 1/2\" 1 ML miscellaneous For methotrexate " use weekly 8 each prn     leucovorin (WELLCOVORIN) 5 MG tablet Take 1 tablet (5 mg) by mouth every 7 days *take 24h after taking weekly methotrexate dose 4 tablet 3     lisinopril (PRINIVIL/ZESTRIL) 5 MG tablet Take 1 tablet (5 mg) by mouth daily 90 tablet 3     methotrexate 50 MG/2ML injection Inject 1 mL (25 mg) Subcutaneous every 7 days 4 mL 3     nystatin (MYCOSTATIN) 423527 UNIT/GM external powder Apply to coat rash tid, neck and breast area rashes 60 g 3     potassium chloride ER (MICRO-K) 10 MEQ CR capsule Take 1 capsule (10 mEq) by mouth 2 times daily 180 capsule 3     warfarin ANTICOAGULANT (COUMADIN) 5 MG tablet Take 1.5 tablets daily or as directed. 50 tablet 1       PAST SURGICAL HISTORY:  Past Surgical History:   Procedure Laterality Date     ARTHROPLASTY KNEE  2012    Procedure: ARTHROPLASTY KNEE;  Left Total Knee Arthroplasty;  Surgeon: Annette Quesada MD;  [facility]     ARTHROSCOPY HIP Right      BRONCHOSCOPY FLEXIBLE AND RIGID N/A 2019    Procedure: Bronchoscopy flexible;  Surgeon: Patrick Rayo MD;  [facility]: [facility]     C TOTAL HIP ARTHROPLASTY  04    RT     CV EXTRACORPERAL MEMBRANE OXYGENATION N/A 2019    Procedure: Extracorporeal Membrance Oxygenation;  Surgeon: Kaleb Mcfarlane MD;  Location:  [facility]     INSERT EXTRACORPORAL MEMBRANE OXYGENATOR N/A 2019    Procedure: Venous-Venous cannulation using ultrasound guidance and transesophageal echocardiogram, venous-arterial ecmo decannulation and repair of left femoral artery;  Surgeon: Patrick Rayo MD;  Location: [facility] OR     IR [facility] THROMB PRIM ART, NON-INTRACRNL  9/10/2019     IR PULMONARY ANGIOGRAM BILATERAL  9/10/2019     IR THROMBOLYSIS ARTERIAL INFUSION INITIAL DAY  9/10/2019       ALLERGIES:     Allergies   Allergen Reactions     Adhesive Tape Blisters     Erythromycin Rash       FAMILY HISTORY:  Father  when she was age 12. Potentially had MI   PGF with MI though  "?age   Mother with HTN, TIA and stroke  Three paternal uncles with CABG, at least one in early 50s     SOCIAL HISTORY:  Social History     Tobacco Use     Smoking status: Former Smoker     Packs/day: 1.00     Years: 33.00     Pack years: 33.00     Types: Cigarettes     Start date: 1982     Last attempt to quit: 2019     Years since quittin.3     Smokeless tobacco: Never Used     Tobacco comment: maybe   Substance Use Topics     Alcohol use: Yes     Alcohol/week: 0.0 standard drinks     Comment: occassional     Drug use: No       ROS:   Constitutional: No fever, chills, or sweats. Weight stable.   ENT: No visual disturbance, ear ache, epistaxis, sore throat.   Cardiovascular: As per HPI.   Respiratory: No cough, hemoptysis.    GI: No nausea, vomiting, hematemesis, melena, or hematochezia.   : No hematuria.   Integument: Negative.   Psychiatric: Negative.   Hematologic:  Easy bruising, no easy bleeding.  Neuro: Negative.   Endocrinology: No significant heat or cold intolerance   Musculoskeletal: No myalgia.    Exam:  /81 (BP Location: Right arm, Patient Position: Chair, Cuff Size: Adult Regular)   Pulse 97   Ht 1.676 m (5' 6\")   Wt (!) 167.1 kg (368 lb 6.4 oz)   BMI 59.46 kg/m    GENERAL APPEARANCE: healthy, alert and no distress  HEENT: no icterus, no xanthelasmas, normal pupil size and reaction, normal palate, mucosa moist, no central cyanosis  NECK: no adenopathy, no asymmetry, masses, or scars, thyroid normal to palpation and no bruits, JVP not elevated  RESPIRATORY: reduced lung sounds on L base   CARDIOVASCULAR: regular rhythm, normal S1 with physiologic split S2, no S3 or S4 and no murmur, click or rub, precordium quiet with normal PMI.  ABDOMEN: soft, non tender, without hepatosplenomegaly, no masses palpable, bowel sounds normal, aorta not enlarged by palpation, no abdominal bruits  EXTREMITIES: peripheral pulses normal, no edema, no bruits  NEURO: alert and oriented to " person/place/time, normal speech, gait and affect  VASC: Radial, femoral, dorsalis pedis and posterior tibialis pulses are normal in volumes and symmetric bilaterally. No bruits are heard.  SKIN: no ecchymoses, no rashes    Labs:  CBC RESULTS:   Lab Results   Component Value Date    WBC 9.1 12/12/2019    RBC 4.45 12/12/2019    HGB 13.4 12/12/2019    HCT 41.5 12/12/2019    MCV 93 12/12/2019    MCH 30.1 12/12/2019    MCHC 32.3 12/12/2019    RDW 15.7 (H) 12/12/2019     12/12/2019       BMP RESULTS:  Lab Results   Component Value Date     12/12/2019    POTASSIUM 4.0 12/12/2019    CHLORIDE 105 12/12/2019    CO2 27 12/12/2019    ANIONGAP 7 12/12/2019     (H) 12/12/2019    BUN 11 12/12/2019    CR 0.69 12/12/2019    GFRESTIMATED >90 12/12/2019    GFRESTBLACK >90 12/12/2019    MARIA INES 9.9 12/12/2019        INR RESULTS:  Lab Results   Component Value Date    INR 1.9 (H) 01/03/2020    INR 1.62 (H) 12/18/2019    INR 2.57 (H) 12/10/2019    INR 2.02 (H) 12/02/2019    INR 2.35 (H) 11/20/2019       Procedures:    ECG today - NSR      Echo 12/23/19  Technically difficult study.  Global and regional left ventricular function is normal with an EF of 55-60%.  Global right ventricular function is normal.  Pulmonary artery systolic pressure is normal.  The inferior vena cava was normal in size with preserved respiratory variability.  No pericardial effusion is present.      Assessment and Plan:   60 y/o F with PMH of massive PE with RV failure resulting in PEA arrest s/p VV ecmo cannulation, sinus pauses, CYRUS, HTN, smoking who presents for f/u for tachycardia.     S/p PEA arrest in the setting of massive PE with RV dysfunction   SOB with exertion  CYRUS  Most recent ECHO without RV dysfunction, no evidence of pHTN   C/w warfarin (lifelong per discontinue summary)  Agree with imaging of her chest to evaluate the extent of pleural effusion and atelectasis , might benefit from paracentesis if pleural effusion if moderate or  large  F/u with pulmonary   Sleep medicine f/u for CYRUS     F/u in 1 year     ATTENDING ATTESTATION:  This patient has been seen and examined by me January 6, 2020 with  Rosy Reyes MD, cardiology fellow. I have reviewed the vitals, laboratory and imaging data relevant to this patient's care. I have edited this note to reflect our joint assessment and plan, and discussed the plan with the patient.    S/p PEA arrest in the setting of massive PE with RV dysfunction   SOB with exertion  CYRUS  No pulmonary hypertension or RV dysfunction 12/23/19  Lifelong anticoagulation    Ms. Le is doing remarkably well after her recent cardiac arrest.  She is euvolemic on exam.  She is on a higher dose of Lasix based on the opacity noted in the left lung field which was thought to be due to pulmonary edema.  She is scheduled to undergo some pulmonary evaluation -  PFTs, 6-minute walk test and a CT scan of the chest to further evaluate this.  She has no signs of hypovolemia so we will continue the Lasix at the current dose for now and advised that she reduce it down to 80 mg once daily once the lung issues are resolved. Echo from 12/2019 show preserved biventricular function with no evidence of pulmonary hypertension. I would highly recommend that she undergo another sleep study for evaluation of sleep apnea. She is tolerating anticoagulation and will continue this indefinitely.     Follow up 1 year.      Brigette Valadez MD, MS  Staff Cardiologist  Pager: [phone]      CC  Patient Care Team:  Anjel Busby MD as PCP - General (Family Practice)  Georgina Richardson MD as MD (Pulmonary)  Anjel Busby MD as MD (Family Practice)  Anjel Chris MD as MD (Family Medicine - Sports Medicine)  Tashi Saxena MD as MD (Family Medicine - Sports Medicine)  Myrna Kim, RN as Nurse Coordinator (Bariatric)  Everett Austin MD as MD (Rheumatology)  Annette Flores RN as Specialty Care  Coordinator (Cardiology)  Anay Herbert, RN as Specialty Care Coordinator (Cardiology)  Marques Gaspar MD as MD (Clinical Cardiac Electrophysiology)  IESHA ANDERSON    Answers for HPI/ROS submitted by the patient on 1/1/2020   General Symptoms: Yes  Skin Symptoms: No  HENT Symptoms: No  EYE SYMPTOMS: No  HEART SYMPTOMS: Yes  LUNG SYMPTOMS: Yes  INTESTINAL SYMPTOMS: No  URINARY SYMPTOMS: No  GYNECOLOGIC SYMPTOMS: No  BREAST SYMPTOMS: No  SKELETAL SYMPTOMS: Yes  BLOOD SYMPTOMS: Yes  NERVOUS SYSTEM SYMPTOMS: No  MENTAL HEALTH SYMPTOMS: Yes  Fever: No  Loss of appetite: No  Weight loss: No  Weight gain: No  Fatigue: Yes  Night sweats: No  Chills: No  Increased stress: No  Excessive hunger: No  Excessive thirst: No  Feeling hot or cold when others believe the temperature is normal: No  Loss of height: No  Post-operative complications: No  Surgical site pain: No  Hallucinations: No  Change in or Loss of Energy: No  Hyperactivity: No  Cough: Yes  Sputum or phlegm: Yes  Coughing up blood: No  Difficulty breating or shortness of breath: Yes  Snoring: No  Wheezing: No  Difficulty breathing on exertion: No  Nighttime Cough: No  Chest pain or pressure: No  Fast or irregular heartbeat: Yes  Pain in legs with walking: No  Trouble breathing while lying down: Yes  Fingers or toes appear blue: No  High blood pressure: No  Low blood pressure: No  Fainting: No  Murmurs: No  Pacemaker: No  Varicose veins: No  Edema or swelling: No  Wake up at night with shortness of breath: No  Light-headedness: No  Exercise intolerance: No  Back pain: Yes  Muscle aches: Yes  Neck pain: No  Swollen joints: No  Joint pain: Yes  Bone pain: No  Muscle cramps: No  Muscle weakness: No  Joint stiffness: Yes  Bone fracture: No  Anemia: No  Swollen glands: No  Easy bleeding or bruising: Yes  Nervous or Anxious: No  Depression: Yes  Trouble sleeping: Yes  Trouble thinking or concentrating: No  Mood changes: No  Panic attacks: No

## 2020-01-06 NOTE — PATIENT INSTRUCTIONS
Patient Instructions:  It was a pleasure to see you in the cardiology clinic today.      If you have any questions, call  Nayeli Jeff RN, at [phone].  Press Option #1 for the [facility], and then press Option #4  We are encouraging the use of [employer] to communicate with your HealthCare Provider    Note the new or changes to your medications: None  Stop the following medications: None    The results from today include: None  Please follow up with Dr. Valadez in 1 year       If you have an urgent need after hours (8:00 am to 4:30 pm) please call [phone] and ask for the cardiology fellow on call.

## 2020-01-06 NOTE — NURSING NOTE
Depression Response    Patient completed the PHQ-9 assessment for depression and scored >9? No  Question 9 on the PHQ-9 was positive for suicidality? No    I personally notified the following: clinic nurse

## 2020-01-06 NOTE — LETTER
1/6/2020      RE: Shira Rodriguez  [address]  [place] [address]       Dear Colleague,    Thank you for the opportunity to participate in the care of your patient, Shira Rodriguez, at the [facility]. Please see a copy of my visit note below.    HPI:     58 y/o F with PMH of massive PE with RV failure resulting in PEA arrest s/p VV ecmo cannulation, sinus pauses, CYRUS, HTN, COPD, rheumatoid arthritis, smoking afib (likely in the setting of dobutamine infusion) who presents for f/u for tachycardia.     She had TTE ordered which showed preserved EF, normal RV function.  She had a V/Q scan that showed reduced ventilation on the left involving the left lower lobe, lingula, and anterior segment of the  left upper lobe.  The perfusion study demonstrates matching changes within the left lung as compared to ventilation. There are no segmental or subsegmental  abnormalities within the lungs to indicate pulmonary emboli.    Patient is adherent on warfarin.         PAST MEDICAL HISTORY:  Past Medical History:   Diagnosis Date     Chronic bronchitis (H) 07/30/2015     Contact dermatitis      Depressive disorder 1985     Eczema     HANDS     Elevated liver enzymes      H/O total knee replacement, left      History of total hip replacement 10/27/2011     Hyperlipidemia      Hypertension      Morbid obesity (H)      Osteoarthrosis     right knee     Sleep apnea      Tobacco use disorder        CURRENT MEDICATIONS:  Current Outpatient Medications   Medication Sig Dispense Refill     acetaminophen (TYLENOL) 325 MG tablet Take 2 tablets (650 mg) by mouth every 6 hours as needed for mild pain or fever       ARIPiprazole (ABILIFY) 5 MG tablet Take 1 tablet (5 mg) by mouth daily 90 tablet 3     atorvastatin (LIPITOR) 40 MG tablet Take 1 tablet (40 mg) by mouth every evening 90 tablet 3     DULoxetine (CYMBALTA) 60 MG capsule Take 1 capsule (60 mg) by  "mouth daily 30 capsule 0     folic acid (FOLVITE) 1 MG tablet Take 4 tablets (4 mg) by mouth daily Take 3-4 tablets daily 120 tablet 0     furosemide (LASIX) 40 MG tablet Take 2 tablets (80 mg) by mouth daily 90 tablet 3     insulin syringe-needle U-100 (BD INSULIN SYRINGE ULTRAFINE) 30G X 1/2\" 1 ML miscellaneous For methotrexate use weekly 8 each prn     leucovorin (WELLCOVORIN) 5 MG tablet Take 1 tablet (5 mg) by mouth every 7 days *take 24h after taking weekly methotrexate dose 4 tablet 3     lisinopril (PRINIVIL/ZESTRIL) 5 MG tablet Take 1 tablet (5 mg) by mouth daily 90 tablet 3     methotrexate 50 MG/2ML injection Inject 1 mL (25 mg) Subcutaneous every 7 days 4 mL 3     nystatin (MYCOSTATIN) 596122 UNIT/GM external powder Apply to coat rash tid, neck and breast area rashes 60 g 3     potassium chloride ER (MICRO-K) 10 MEQ CR capsule Take 1 capsule (10 mEq) by mouth 2 times daily 180 capsule 3     warfarin ANTICOAGULANT (COUMADIN) 5 MG tablet Take 1.5 tablets daily or as directed. 50 tablet 1       PAST SURGICAL HISTORY:  Past Surgical History:   Procedure Laterality Date     ARTHROPLASTY KNEE  6/14/2012    Procedure: ARTHROPLASTY KNEE;  Left Total Knee Arthroplasty;  Surgeon: Annette Quesada MD;  [facility]     ARTHROSCOPY HIP Right      BRONCHOSCOPY FLEXIBLE AND RIGID N/A 9/17/2019    Procedure: Bronchoscopy flexible;  Surgeon: Patrick Rayo MD;  Location:  OR     C TOTAL HIP ARTHROPLASTY  07/09/04    RT     CV EXTRACORPERAL MEMBRANE OXYGENATION N/A 9/9/2019    Procedure: Extracorporeal Membrance Oxygenation;  Surgeon: Kaleb Mcfarlane MD;  Location:  [facility]     INSERT EXTRACORPORAL MEMBRANE OXYGENATOR N/A 9/17/2019    Procedure: Venous-Venous cannulation using ultrasound guidance and transesophageal echocardiogram, venous-arterial ecmo decannulation and repair of left femoral artery;  Surgeon: Patrick Rayo MD;  Location:  OR     [facility] THROMB PRIM ART, " "NON-INTRACRNL  9/10/2019     IR PULMONARY ANGIOGRAM BILATERAL  9/10/2019     IR THROMBOLYSIS ARTERIAL INFUSION INITIAL DAY  9/10/2019       ALLERGIES:     Allergies   Allergen Reactions     Adhesive Tape Blisters     Erythromycin Rash       FAMILY HISTORY:  Father  when she was age 12. Potentially had MI   PGF with MI though ?age   Mother with HTN, TIA and stroke  Three paternal uncles with CABG, at least one in early 50s     SOCIAL HISTORY:  Social History     Tobacco Use     Smoking status: Former Smoker     Packs/day: 1.00     Years: 33.00     Pack years: 33.00     Types: Cigarettes     Start date: 1982     Last attempt to quit: 2019     Years since quittin.3     Smokeless tobacco: Never Used     Tobacco comment: maybe   Substance Use Topics     Alcohol use: Yes     Alcohol/week: 0.0 standard drinks     Comment: occassional     Drug use: No       ROS:   Constitutional: No fever, chills, or sweats. Weight stable.   ENT: No visual disturbance, ear ache, epistaxis, sore throat.   Cardiovascular: As per HPI.   Respiratory: No cough, hemoptysis.    GI: No nausea, vomiting, hematemesis, melena, or hematochezia.   : No hematuria.   Integument: Negative.   Psychiatric: Negative.   Hematologic:  Easy bruising, no easy bleeding.  Neuro: Negative.   Endocrinology: No significant heat or cold intolerance   Musculoskeletal: No myalgia.    Exam:  /81 (BP Location: Right arm, Patient Position: Chair, Cuff Size: Adult Regular)   Pulse 97   Ht 1.676 m (5' 6\")   Wt (!) 167.1 kg (368 lb 6.4 oz)   BMI 59.46 kg/m     GENERAL APPEARANCE: healthy, alert and no distress  HEENT: no icterus, no xanthelasmas, normal pupil size and reaction, normal palate, mucosa moist, no central cyanosis  NECK: no adenopathy, no asymmetry, masses, or scars, thyroid normal to palpation and no bruits, JVP not elevated  RESPIRATORY: reduced lung sounds on L base   CARDIOVASCULAR: regular rhythm, normal S1 with physiologic " split S2, no S3 or S4 and no murmur, click or rub, precordium quiet with normal PMI.  ABDOMEN: soft, non tender, without hepatosplenomegaly, no masses palpable, bowel sounds normal, aorta not enlarged by palpation, no abdominal bruits  EXTREMITIES: peripheral pulses normal, no edema, no bruits  NEURO: alert and oriented to person/place/time, normal speech, gait and affect  VASC: Radial, femoral, dorsalis pedis and posterior tibialis pulses are normal in volumes and symmetric bilaterally. No bruits are heard.  SKIN: no ecchymoses, no rashes    Labs:  CBC RESULTS:   Lab Results   Component Value Date    WBC 9.1 12/12/2019    RBC 4.45 12/12/2019    HGB 13.4 12/12/2019    HCT 41.5 12/12/2019    MCV 93 12/12/2019    MCH 30.1 12/12/2019    MCHC 32.3 12/12/2019    RDW 15.7 (H) 12/12/2019     12/12/2019       BMP RESULTS:  Lab Results   Component Value Date     12/12/2019    POTASSIUM 4.0 12/12/2019    CHLORIDE 105 12/12/2019    CO2 27 12/12/2019    ANIONGAP 7 12/12/2019     (H) 12/12/2019    BUN 11 12/12/2019    CR 0.69 12/12/2019    GFRESTIMATED >90 12/12/2019    GFRESTBLACK >90 12/12/2019    MARIA INES 9.9 12/12/2019        INR RESULTS:  Lab Results   Component Value Date    INR 1.9 (H) 01/03/2020    INR 1.62 (H) 12/18/2019    INR 2.57 (H) 12/10/2019    INR 2.02 (H) 12/02/2019    INR 2.35 (H) 11/20/2019       Procedures:    ECG today - NSR      Echo 12/23/19  Technically difficult study.  Global and regional left ventricular function is normal with an EF of 55-60%.  Global right ventricular function is normal.  Pulmonary artery systolic pressure is normal.  The inferior vena cava was normal in size with preserved respiratory variability.  No pericardial effusion is present.      Assessment and Plan:   58 y/o F with PMH of massive PE with RV failure resulting in PEA arrest s/p VV ecmo cannulation, sinus pauses, CYRUS, HTN, smoking who presents for f/u for tachycardia.     S/p PEA arrest in the setting of  massive PE with RV dysfunction   SOB with exertion  CYRUS  Most recent ECHO without RV dysfunction, no evidence of pHTN   C/w warfarin (lifelong per discontinue summary)  Agree with imaging of her chest to evaluate the extent of pleural effusion and atelectasis , might benefit from paracentesis if pleural effusion if moderate or large  F/u with pulmonary   Sleep medicine f/u for CYRUS     F/u in 1 year     ATTENDING ATTESTATION:  This patient has been seen and examined by me January 6, 2020 with  Rosy Reyse MD, cardiology fellow. I have reviewed the vitals, laboratory and imaging data relevant to this patient's care. I have edited this note to reflect our joint assessment and plan, and discussed the plan with the patient.    S/p PEA arrest in the setting of massive PE with RV dysfunction   SOB with exertion  CYRUS  No pulmonary hypertension or RV dysfunction 12/23/19  Lifelong anticoagulation    Ms. Le is doing remarkably well after her recent cardiac arrest.  She is euvolemic on exam.  She is on a higher dose of Lasix based on the opacity noted in the left lung field which was thought to be due to pulmonary edema.  She is scheduled to undergo some pulmonary evaluation -  PFTs, 6-minute walk test and a CT scan of the chest to further evaluate this.  She has no signs of hypovolemia so we will continue the Lasix at the current dose for now and advised that she reduce it down to 80 mg once daily once the lung issues are resolved. Echo from 12/2019 show preserved biventricular function with no evidence of pulmonary hypertension. I would highly recommend that she undergo another sleep study for evaluation of sleep apnea. She is tolerating anticoagulation and will continue this indefinitely.     Follow up 1 year.      Birgette Valadez MD, MS  Staff Cardiologist  Pager: [phone]      CC  Patient Care Team:  Anjel Busby MD as PCP - General (Family Practice)  Georgina Richardson MD as MD  (Pulmonary)  Anjel Busby MD as MD (Family Practice)  Anjel Chris MD as MD (Family Medicine - Sports Medicine)  Tashi Saxena MD as MD (Family Medicine - Sports Medicine)  Myrna Kim, RN as Nurse Coordinator (Bariatric)  Everett Austin MD as MD (Rheumatology)  Annette Flores, RN as Specialty Care Coordinator (Cardiology)  Anay Herbert, ELLIS as Specialty Care Coordinator (Cardiology)  Marques Gaspar MD as MD (Clinical Cardiac Electrophysiology)  IESHA ANDERSON

## 2020-01-06 NOTE — NURSING NOTE
Chief Complaint   Patient presents with     Follow Up      return/follow-up, 59 year old female with a history of CYRUS, HTN, tobacco use disorder, who presents for a follow up appointment after PEA arrest secondary to massive PE.      Vitals were taken, medications reconciled and EKG performed.    Anmol Guzman CMA    1:14 PM       Atrial fibrillation Sepsis, due to unspecified organism

## 2020-01-07 LAB — INTERPRETATION ECG - MUSE: NORMAL

## 2020-01-08 DIAGNOSIS — M05.79 RHEUMATOID ARTHRITIS INVOLVING MULTIPLE SITES WITH POSITIVE RHEUMATOID FACTOR (H): ICD-10-CM

## 2020-01-08 DIAGNOSIS — Z79.899 HIGH RISK MEDICATION USE: ICD-10-CM

## 2020-01-09 ENCOUNTER — ANTICOAGULATION THERAPY VISIT (OUTPATIENT)
Dept: ANTICOAGULATION | Facility: CLINIC | Age: 60
End: 2020-01-09

## 2020-01-09 DIAGNOSIS — I26.92 CHRONIC SADDLE PULMONARY EMBOLISM WITHOUT ACUTE COR PULMONALE (H): ICD-10-CM

## 2020-01-09 DIAGNOSIS — I26.99 PULMONARY EMBOLISM, UNSPECIFIED CHRONICITY, UNSPECIFIED PULMONARY EMBOLISM TYPE, UNSPECIFIED WHETHER ACUTE COR PULMONALE PRESENT (H): ICD-10-CM

## 2020-01-09 DIAGNOSIS — I27.82 CHRONIC SADDLE PULMONARY EMBOLISM WITHOUT ACUTE COR PULMONALE (H): ICD-10-CM

## 2020-01-09 LAB
CAPILLARY BLOOD COLLECTION: NORMAL
INR BLD: 2 (ref 0.86–1.14)

## 2020-01-09 PROCEDURE — 85610 PROTHROMBIN TIME: CPT | Mod: QW | Performed by: FAMILY MEDICINE

## 2020-01-09 PROCEDURE — 36416 COLLJ CAPILLARY BLOOD SPEC: CPT | Performed by: FAMILY MEDICINE

## 2020-01-09 NOTE — PROGRESS NOTES
ANTICOAGULATION FOLLOW-UP CLINIC VISIT    Patient Name:  Shira Rodriguez  Date:  2020  Contact Type:  Telephone    SUBJECTIVE:         OBJECTIVE    INR Point of Care   Date Value Ref Range Status   2020 2.0 (H) 0.86 - 1.14 Final     Comment:     This test is intended for monitoring Coumadin therapy.  Results are not   accurate in patients with prolonged INR due to factor deficiency.         ASSESSMENT / PLAN  No question data found.  Anticoagulation Summary  As of 2020    INR goal:   2.0-3.0   TTR:   52.3 % (2.7 mo)   INR used for dosin.0 (2020)   Warfarin maintenance plan:   10 mg (5 mg x 2) every Fri; 7.5 mg (5 mg x 1.5) all other days   Full warfarin instructions:   10 mg every Fri; 7.5 mg all other days   Weekly warfarin total:   55 mg   No change documented:   Mariah Sellers RN   Plan last modified:   Tea Deng RN (1/3/2020)   Next INR check:   2020   Priority:   High   Target end date:       Indications    Pulmonary emboli (H) [I26.99]             Anticoagulation Episode Summary     INR check location:       Preferred lab:       Send INR reminders to:   [facility]    Comments:   HIPPA Forms returned 19  OK to leave messages on Cell  [phone]      Anticoagulation Care Providers     Provider Role Specialty Phone number    Anjel Busby MD [facility] [phone]            See the Encounter Report to view Anticoagulation Flowsheet and Dosing Calendar (Go to Encounters tab in chart review, and find the Anticoagulation Therapy Visit)    Spoke with patient.     Mariah Sellers RN

## 2020-01-10 RX ORDER — FOLIC ACID 1 MG/1
TABLET ORAL
Qty: 120 TABLET | Refills: 0 | Status: SHIPPED | OUTPATIENT
Start: 2020-01-10 | End: 2020-02-09

## 2020-01-10 NOTE — TELEPHONE ENCOUNTER
FOLIC ACID 1 MG TABLET     Last Written Prescription Date:  10/23/2019  Last Fill Quantity: 120,   # refills: 0  Last Office Visit : 10/23/2019  Future Office visit:  0  120 Tabs, 0 refills sent to pharmacy for Pt care.   1/10/2020    Emperatriz Beltre RN  Central Triage Red Flags/Med Refills        Instructions   Rheumatology Clinic  Everett Austin MD  10/23/2019       Return in about 4 months (around 2/23/2020).   Dx;  1. Inflammatory arthritis, possible low-grade activity in hands     Plan:  1. Continue methotrexate 25 mg injected weekly  2. Leukovorin, folate daily  3. bloodwork in 3 months--labs already ordered     Expect gradual improvement over coming 3-4 weeks.     Continue off the cigarettes.

## 2020-01-14 ENCOUNTER — ANTICOAGULATION THERAPY VISIT (OUTPATIENT)
Dept: ANTICOAGULATION | Facility: CLINIC | Age: 60
End: 2020-01-14

## 2020-01-14 ENCOUNTER — TELEPHONE (OUTPATIENT)
Dept: PULMONOLOGY | Facility: CLINIC | Age: 60
End: 2020-01-14

## 2020-01-14 ENCOUNTER — ANCILLARY PROCEDURE (OUTPATIENT)
Dept: CT IMAGING | Facility: CLINIC | Age: 60
End: 2020-01-14
Attending: INTERNAL MEDICINE
Payer: COMMERCIAL

## 2020-01-14 ENCOUNTER — OFFICE VISIT (OUTPATIENT)
Dept: PULMONOLOGY | Facility: CLINIC | Age: 60
End: 2020-01-14
Attending: INTERNAL MEDICINE
Payer: COMMERCIAL

## 2020-01-14 VITALS
WEIGHT: 293 LBS | BODY MASS INDEX: 47.09 KG/M2 | HEART RATE: 90 BPM | HEIGHT: 66 IN | SYSTOLIC BLOOD PRESSURE: 123 MMHG | DIASTOLIC BLOOD PRESSURE: 86 MMHG | OXYGEN SATURATION: 94 %

## 2020-01-14 DIAGNOSIS — I26.92 CHRONIC SADDLE PULMONARY EMBOLISM WITHOUT ACUTE COR PULMONALE (H): ICD-10-CM

## 2020-01-14 DIAGNOSIS — Z86.711 HISTORY OF PULMONARY EMBOLISM: ICD-10-CM

## 2020-01-14 DIAGNOSIS — I27.82 OTHER CHRONIC PULMONARY EMBOLISM WITHOUT ACUTE COR PULMONALE (H): ICD-10-CM

## 2020-01-14 DIAGNOSIS — R93.89 ABNORMAL CHEST CT: ICD-10-CM

## 2020-01-14 DIAGNOSIS — J90 PLEURAL EFFUSION: ICD-10-CM

## 2020-01-14 DIAGNOSIS — R94.2 ABNORMAL PFT: ICD-10-CM

## 2020-01-14 DIAGNOSIS — M05.742 RHEUMATOID ARTHRITIS INVOLVING BOTH HANDS WITH POSITIVE RHEUMATOID FACTOR (H): ICD-10-CM

## 2020-01-14 DIAGNOSIS — M05.741 RHEUMATOID ARTHRITIS INVOLVING BOTH HANDS WITH POSITIVE RHEUMATOID FACTOR (H): ICD-10-CM

## 2020-01-14 DIAGNOSIS — I27.82 CHRONIC SADDLE PULMONARY EMBOLISM WITHOUT ACUTE COR PULMONALE (H): ICD-10-CM

## 2020-01-14 DIAGNOSIS — J96.01 ACUTE RESPIRATORY FAILURE WITH HYPOXIA (H): Primary | ICD-10-CM

## 2020-01-14 DIAGNOSIS — R06.02 SHORTNESS OF BREATH: ICD-10-CM

## 2020-01-14 DIAGNOSIS — I26.99 PULMONARY EMBOLISM, UNSPECIFIED CHRONICITY, UNSPECIFIED PULMONARY EMBOLISM TYPE, UNSPECIFIED WHETHER ACUTE COR PULMONALE PRESENT (H): ICD-10-CM

## 2020-01-14 LAB
6 MIN WALK (FT): 335 FT
6 MIN WALK (M): 102 M
ALT SERPL W P-5'-P-CCNC: 17 U/L (ref 0–50)
AST SERPL W P-5'-P-CCNC: 13 U/L (ref 0–45)
CREAT BLD-MCNC: 0.7 MG/DL (ref 0.52–1.04)
CREAT SERPL-MCNC: 0.75 MG/DL (ref 0.52–1.04)
ERYTHROCYTE [DISTWIDTH] IN BLOOD BY AUTOMATED COUNT: 14.5 % (ref 10–15)
GFR SERPL CREATININE-BSD FRML MDRD: 86 ML/MIN/{1.73_M2}
GFR SERPL CREATININE-BSD FRML MDRD: 88 ML/MIN/{1.73_M2}
HCT VFR BLD AUTO: 40.9 % (ref 35–47)
HGB BLD-MCNC: 13 G/DL (ref 11.7–15.7)
INR PPP: 1.62 (ref 0.86–1.14)
MCH RBC QN AUTO: 29.7 PG (ref 26.5–33)
MCHC RBC AUTO-ENTMCNC: 31.8 G/DL (ref 31.5–36.5)
MCV RBC AUTO: 94 FL (ref 78–100)
PLATELET # BLD AUTO: 339 10E9/L (ref 150–450)
RBC # BLD AUTO: 4.37 10E12/L (ref 3.8–5.2)
WBC # BLD AUTO: 7.4 10E9/L (ref 4–11)

## 2020-01-14 PROCEDURE — 84460 ALANINE AMINO (ALT) (SGPT): CPT | Performed by: FAMILY MEDICINE

## 2020-01-14 PROCEDURE — 85027 COMPLETE CBC AUTOMATED: CPT | Performed by: FAMILY MEDICINE

## 2020-01-14 PROCEDURE — 82565 ASSAY OF CREATININE: CPT | Performed by: FAMILY MEDICINE

## 2020-01-14 PROCEDURE — 36415 COLL VENOUS BLD VENIPUNCTURE: CPT | Performed by: FAMILY MEDICINE

## 2020-01-14 PROCEDURE — G0463 HOSPITAL OUTPT CLINIC VISIT: HCPCS | Mod: 25,ZF

## 2020-01-14 PROCEDURE — 84450 TRANSFERASE (AST) (SGOT): CPT | Performed by: FAMILY MEDICINE

## 2020-01-14 PROCEDURE — 85610 PROTHROMBIN TIME: CPT | Performed by: FAMILY MEDICINE

## 2020-01-14 RX ORDER — IOPAMIDOL 755 MG/ML
125 INJECTION, SOLUTION INTRAVASCULAR ONCE
Status: COMPLETED | OUTPATIENT
Start: 2020-01-14 | End: 2020-01-14

## 2020-01-14 RX ADMIN — IOPAMIDOL 125 ML: 755 INJECTION, SOLUTION INTRAVASCULAR at 08:35

## 2020-01-14 ASSESSMENT — PAIN SCALES - GENERAL: PAINLEVEL: MILD PAIN (3)

## 2020-01-14 ASSESSMENT — MIFFLIN-ST. JEOR: SCORE: 2275.06

## 2020-01-14 NOTE — PATIENT INSTRUCTIONS
1. I am ordering oxygen 4 liters with activity/exertion    2. Schedule thoracentesis, Thursday. Will need to bridge with Lovenox.   No more coumadin until after procedure.   Take Lovenox tonight, Wed AM, Wed PM,   NO Lovenox on Thursday PM.   Resume Lovenox Friday morning  Resume warfarin Thursday evening    3. Please try to wear CPAP you have at night, even without sleep aid, I think something is better than nothing.

## 2020-01-14 NOTE — TELEPHONE ENCOUNTER
Contacted by Kevin from [facility] to inquire if she should send tubing to bleed oxygen into Cpap with oxygen equipment that patient is being set up with tomorrow. Explained to Kevin that even though Dr Richardson states in note that patient has not been using Cpap much, that Dr Richardson is recommending that patient use Cpap. So therefore please send tubing.    Kevin left message that patient refused tubing to bleed oxygen into Cpap.

## 2020-01-14 NOTE — LETTER
"1/14/2020       RE: Shira Rodriguez  [address]  [place] [address]     Dear Colleague,    Thank you for referring your patient, Shira Rodriguez, to the [facility] at [facility]. Please see a copy of my visit note below.    [facility] Pulmonology Follow Up Visit    Reason for Visit  Shira Rodriguez is a 59 year old female who is followed for dyspnea, obesity, nicotine dependence   Pulmonary HPI  She had multiple bilateral pulmonary embolic leading to cardiac arrest in September, transferred to the [place], placed on ECMO. She is bothered mostly by shortness of breath. She did quit smoking but still struggling with cravings, her mother smokes so she is still around cigarettes.     She was feeling badly through the summer worsening dyspnea, was very sedentary.  She has CPAP but not doing at all right now, had trouble keeping it on. She was advised to use Ambien    She has RA on methotrexate has been on about 2 years.     The patient was seen and examined by Georgina Richardson MD   Current Outpatient Medications   Medication     acetaminophen (TYLENOL) 325 MG tablet     ARIPiprazole (ABILIFY) 5 MG tablet     atorvastatin (LIPITOR) 40 MG tablet     DULoxetine (CYMBALTA) 60 MG capsule     folic acid (FOLVITE) 1 MG tablet     furosemide (LASIX) 40 MG tablet     insulin syringe-needle U-100 (BD INSULIN SYRINGE ULTRAFINE) 30G X 1/2\" 1 ML miscellaneous     leucovorin (WELLCOVORIN) 5 MG tablet     lisinopril (PRINIVIL/ZESTRIL) 5 MG tablet     methotrexate 50 MG/2ML injection     nystatin (MYCOSTATIN) 280382 UNIT/GM external powder     potassium chloride ER (MICRO-K) 10 MEQ CR capsule     warfarin ANTICOAGULANT (COUMADIN) 5 MG tablet     Current Facility-Administered Medications   Medication     lidocaine (PF) (XYLOCAINE) 1 % injection 4 mL     triamcinolone (KENALOG-40) injection 40 mg "     Facility-Administered Medications Ordered in Other Visits   Medication     sodium chloride (PF) 0.9% PF flush 10 mL     Allergies   Allergen Reactions     Adhesive Tape Blisters     Erythromycin Rash     Social History     Socioeconomic History     Marital status: Single     Spouse name: Not on file     Number of children: Not on file     Years of education: Not on file     Highest education level: Not on file   Occupational History     Occupation: registered nurse     Comment: [employer]   Social Needs     Financial resource strain: Not on file     Food insecurity:     Worry: Not on file     Inability: Not on file     Transportation needs:     Medical: Not on file     Non-medical: Not on file   Tobacco Use     Smoking status: Former Smoker     Packs/day: 1.00     Years: 33.00     Pack years: 33.00     Types: Cigarettes     Start date: 1982     Last attempt to quit: 2019     Years since quittin.3     Smokeless tobacco: Never Used     Tobacco comment: maybe   Substance and Sexual Activity     Alcohol use: Yes     Alcohol/week: 0.0 standard drinks     Comment: occassional     Drug use: No     Sexual activity: Never   Lifestyle     Physical activity:     Days per week: Not on file     Minutes per session: Not on file     Stress: Not on file   Relationships     Social connections:     Talks on phone: Not on file     Gets together: Not on file     Attends Synagogue service: Not on file     Active member of club or organization: Not on file     Attends meetings of clubs or organizations: Not on file     Relationship status: Not on file     Intimate partner violence:     Fear of current or ex partner: Not on file     Emotionally abused: Not on file     Physically abused: Not on file     Forced sexual activity: Not on file   Other Topics Concern     Parent/sibling w/ CABG, MI or angioplasty before 65F 55M? Not Asked   Social History Narrative     Not on file     Past Medical History:   Diagnosis Date      Chronic bronchitis (H) 07/30/2015     Contact dermatitis      Depressive disorder 1985     Eczema     HANDS     Elevated liver enzymes      H/O total knee replacement, left      History of total hip replacement 10/27/2011     Hyperlipidemia      Hypertension      Morbid obesity (H)      Osteoarthrosis     right knee     Sleep apnea      Tobacco use disorder      Past Surgical History:   Procedure Laterality Date     ARTHROPLASTY KNEE  6/14/2012    Procedure: ARTHROPLASTY KNEE;  Left Total Knee Arthroplasty;  Surgeon: Annette Quesada MD;  Location: [facility] OR     ARTHROSCOPY HIP Right      BRONCHOSCOPY FLEXIBLE AND RIGID N/A 9/17/2019    Procedure: Bronchoscopy flexible;  Surgeon: Patrick Rayo MD;  Location: [facility] OR     C TOTAL HIP ARTHROPLASTY  07/09/04    RT     CV EXTRACORPERAL MEMBRANE OXYGENATION N/A 9/9/2019    Procedure: Extracorporeal Membrance Oxygenation;  Surgeon: Kaleb Mcfarlane MD;  Location: [facility]     INSERT EXTRACORPORAL MEMBRANE OXYGENATOR N/A 9/17/2019    Procedure: Venous-Venous cannulation using ultrasound guidance and transesophageal echocardiogram, venous-arterial ecmo decannulation and repair of left femoral artery;  Surgeon: Patrick Rayo MD;  Location: [facility] OR     IR MECH THROMB PRIM ART, NON-INTRACRNL  9/10/2019     IR PULMONARY ANGIOGRAM BILATERAL  9/10/2019     IR THROMBOLYSIS ARTERIAL INFUSION INITIAL DAY  9/10/2019     Family History   Problem Relation Age of Onset     Thyroid Disease Mother      Hypertension Mother      Skin Cancer Mother         MMohs surgery with skin graft     Cerebrovascular Disease Mother         CVA after endarderectomy     Diabetes Mother      Breast Cancer Paternal Grandmother      Pancreatic Cancer Paternal Grandmother      Cardiovascular Paternal Aunt      Cardiovascular Paternal Uncle      Depression Other      Alcoholism Father      Thyroid Disease Sister      Alcoholism Sister      Hypertension Maternal Grandmother       "Heart Disease Sister         multiple ablations     Alcoholism Sister      Prostate Cancer Paternal Grandfather      ROS Pulmonary  Dyspnea: Yes, Cough: No, Chest pain: No, Wheezing: No, Sputum Production: No, Hemoptysis: No  A complete ROS was otherwise negative except as noted in the HPI.  /86   Pulse 90   Ht 1.676 m (5' 6\")   Wt (!) 167.8 kg (370 lb)   SpO2 94%   BMI 59.72 kg/m     Exam:   GENERAL APPEARANCE: Well developed, well nourished, alert, and in no apparent distress.  EYES: PERRL, EOMI  HENT: Nasal mucosa with no edema and no hyperemia. No nasal polyps.  EARS: Canals clear, TMs normal  MOUTH: Oral mucosa is moist, without any lesions, no tonsillar enlargement, no oropharyngeal exudate.  NECK: supple, no masses, no thyromegaly.  LYMPHATICS: No significant axillary, cervical, or supraclavicular nodes.  RESP: normal percussion, good air flow throughout.  No crackles. No rhonchi. No wheezes.  CV: Normal S1, S2, regular rhythm, normal rate. No murmur.  No rub. No gallop. No LE edema.   ABDOMEN:  Bowel sounds normal, soft, nontender, no HSM or masses.   MS: extremities normal. No clubbing. No cyanosis.  SKIN: no rash on limited exam  NEURO: Mentation intact, speech normal, normal strength and tone, normal gait and stance  PSYCH: mentation appears normal. and affect normal/bright  Results:  Echo 12/23/19 normal LVEF and RV  6MWT today shows hypoxemia, needed 4 lpm to maintain SpO2  PFTs today show moderately severe restriction, moderately reduced diffusion.   CT chest today shows persistent bilateral groundglass opacities, left pleural effusion significant decline from prior testing.    Assessment and plan: Shira is a 58 yo female who is being seen in follow up. She was previously seen for nicotine dependence, dyspnea, obesity.  About 3 months ago, she had a cardiac arrest and was hospitalized.  She required ECMO therapy and the cardiac arrest was attributed to bilateral pulmonary emboli.  She has " been out of the hospital and acute rehab since October, has been incredibly short of breath since discharge.  She has lost some weight, she is about 30 pounds down from her peak weight.  She has also quit smoking.    Dyspnea, hypoxic respiratory failure  - interstitial lung abnormalities -seen while she was hospitalized in persistent today.  The CT today was with contrast.  We will not do too much about this right now, continue to monitor.  But we did briefly discussed that rheumatoid arthritis and methotrexate are concerning in the face of this and we may need to consider rheumatoid arthritis associated interstitial lung disease or methotrexate associated toxicity.  I will repeat pulmonary function test and CT in about 3 months to assess.    - pleural effusion -she had a thoracentesis while hospitalized, the analysis from that fluid suggests a complicated exudative effusion with undetectable glucose, high LDH high protein and predominantly neutrophils.  She had the thoracentesis about 10 days after a positive sputum culture so I am sure she was receiving antibiotics at that time.  Nonetheless, the pleural effusion persists so we will perform an additional thoracentesis for drainage and analysis. Schedule thoracentesis, Thursday. Will need to bridge with Lovenox as follows: No more coumadin until after procedure. Take Lovenox tonight, Wed AM, Wed PM, NO Lovenox on Thursday PM. Resume Lovenox Friday morning. Resume warfarin Thursday evening.  I will communicate with the Coumadin clinic and Dr. Bragg.    Nicotine dependence-we discussed for 8 minutes today.  She has quit but still has cravings.  Her mom continues to smoke around her.  She still has Chantix at home.  I told her it would be okay  to take Chantix if she is still having cravings and has access via her mother.  Shira remains committed to abstinence.    Needs portable oxygen -likely related to both above, possible pulmonary hypertension.  Her VQ scan  and echocardiogram are normal, which is good.  The PA diameter was reported to be elevated on the CT scan today, which is less specific.  I am ordering 4 L with exertion based on the 6-minute walk test performed today.  We will reassess 6-minute walk test at her next visit.  If dyspnea does not improve, may consider suggesting a right heart catheterization to get gold standard measurement on her pulmonary artery pressures.      DME (Durable Medical Equipment) Orders and Documentation  Orders Placed This Encounter   Procedures     Home Oxygen Order      The patient was assessed and it was determined the patient is in need of the following listed DME Supplies/Equipment. Please complete supporting documentation below to demonstrate medical necessity.      Oxygen Use Documentation    I certify that this patient, Shira Rodriguez has been under my care and that I, or a nurse practitioner or physician's assistant working with me, had a face-to-face encounter that meets face-to-face encounter requirements with this patient on January 14, 2020.    Shira Rodriguez is now in a chronic stable state and continues to require supplemental oxygen due to continued oxygen desaturation.  This patient has been treated in part, or in whole for the following medical condition(s):  ILD J84.9  Pulmonary Embolism Chronic I27.82  Rheumatoid Lung Disease M05.10  Treatments tried and failed or ruled out to treat hypoxemia include diuretics, thoracentesis.  If portability is ordered, is the patient mobile within the home? yes    RTC 3 months with PFT and CT. Thoracentesis later this week      Again, thank you for allowing me to participate in the care of your patient.      Sincerely,    Georgina Richardson MD

## 2020-01-14 NOTE — PROGRESS NOTES
"[facility] Pulmonology Follow Up Visit    Reason for Visit  Shira Rodriguez is a 59 year old female who is followed for dyspnea, obesity, nicotine dependence   Pulmonary HPI  She had multiple bilateral pulmonary embolic leading to cardiac arrest in September, transferred to the [facility], placed on ECMO. She is bothered mostly by shortness of breath. She did quit smoking but still struggling with cravings, her mother smokes so she is still around cigarettes.     She was feeling badly through the summer worsening dyspnea, was very sedentary.  She has CPAP but not doing at all right now, had trouble keeping it on. She was advised to use Ambien    She has RA on methotrexate has been on about 2 years.     The patient was seen and examined by Georgina Richardson MD   Current Outpatient Medications   Medication     acetaminophen (TYLENOL) 325 MG tablet     ARIPiprazole (ABILIFY) 5 MG tablet     atorvastatin (LIPITOR) 40 MG tablet     DULoxetine (CYMBALTA) 60 MG capsule     folic acid (FOLVITE) 1 MG tablet     furosemide (LASIX) 40 MG tablet     insulin syringe-needle U-100 (BD INSULIN SYRINGE ULTRAFINE) 30G X 1/2\" 1 ML miscellaneous     leucovorin (WELLCOVORIN) 5 MG tablet     lisinopril (PRINIVIL/ZESTRIL) 5 MG tablet     methotrexate 50 MG/2ML injection     nystatin (MYCOSTATIN) 886391 UNIT/GM external powder     potassium chloride ER (MICRO-K) 10 MEQ CR capsule     warfarin ANTICOAGULANT (COUMADIN) 5 MG tablet     Current Facility-Administered Medications   Medication     lidocaine (PF) (XYLOCAINE) 1 % injection 4 mL     triamcinolone (KENALOG-40) injection 40 mg     Facility-Administered Medications Ordered in Other Visits   Medication     sodium chloride (PF) 0.9% PF flush 10 mL     Allergies   Allergen Reactions     Adhesive Tape Blisters     Erythromycin Rash     Social History     Socioeconomic History     Marital status: Single     Spouse name: Not on file     Number of children: Not on file "     Years of education: Not on file     Highest education level: Not on file   Occupational History     Occupation: registered nurse     Comment: [employer]   Social Needs     Financial resource strain: Not on file     Food insecurity:     Worry: Not on file     Inability: Not on file     Transportation needs:     Medical: Not on file     Non-medical: Not on file   Tobacco Use     Smoking status: Former Smoker     Packs/day: 1.00     Years: 33.00     Pack years: 33.00     Types: Cigarettes     Start date: 1982     Last attempt to quit: 2019     Years since quittin.3     Smokeless tobacco: Never Used     Tobacco comment: maybe   Substance and Sexual Activity     Alcohol use: Yes     Alcohol/week: 0.0 standard drinks     Comment: occassional     Drug use: No     Sexual activity: Never   Lifestyle     Physical activity:     Days per week: Not on file     Minutes per session: Not on file     Stress: Not on file   Relationships     Social connections:     Talks on phone: Not on file     Gets together: Not on file     Attends Synagogue service: Not on file     Active member of club or organization: Not on file     Attends meetings of clubs or organizations: Not on file     Relationship status: Not on file     Intimate partner violence:     Fear of current or ex partner: Not on file     Emotionally abused: Not on file     Physically abused: Not on file     Forced sexual activity: Not on file   Other Topics Concern     Parent/sibling w/ CABG, MI or angioplasty before 65F 55M? Not Asked   Social History Narrative     Not on file     Past Medical History:   Diagnosis Date     Chronic bronchitis (H) 2015     Contact dermatitis      Depressive disorder 1985     Eczema     HANDS     Elevated liver enzymes      H/O total knee replacement, left      History of total hip replacement 10/27/2011     Hyperlipidemia      Hypertension      Morbid obesity (H)      Osteoarthrosis     right knee     Sleep apnea       "Tobacco use disorder      Past Surgical History:   Procedure Laterality Date     ARTHROPLASTY KNEE  6/14/2012    Procedure: ARTHROPLASTY KNEE;  Left Total Knee Arthroplasty;  Surgeon: Annette Quesada MD;  Location: [facility] OR     ARTHROSCOPY HIP Right      BRONCHOSCOPY FLEXIBLE AND RIGID N/A 9/17/2019    Procedure: Bronchoscopy flexible;  Surgeon: Patrick Rayo MD;  Location: [facility] OR     C TOTAL HIP ARTHROPLASTY  07/09/04    RT     CV EXTRACORPERAL MEMBRANE OXYGENATION N/A 9/9/2019    Procedure: Extracorporeal Membrance Oxygenation;  Surgeon: Kaleb Mcfarlane MD;  Location: [facility]     INSERT EXTRACORPORAL MEMBRANE OXYGENATOR N/A 9/17/2019    Procedure: Venous-Venous cannulation using ultrasound guidance and transesophageal echocardiogram, venous-arterial ecmo decannulation and repair of left femoral artery;  Surgeon: Patrick Rayo MD;  Location: [facility] OR     IR MECH THROMB PRIM ART, NON-INTRACRNL  9/10/2019     IR PULMONARY ANGIOGRAM BILATERAL  9/10/2019     IR THROMBOLYSIS ARTERIAL INFUSION INITIAL DAY  9/10/2019     Family History   Problem Relation Age of Onset     Thyroid Disease Mother      Hypertension Mother      Skin Cancer Mother         MMohs surgery with skin graft     Cerebrovascular Disease Mother         CVA after endarderectomy     Diabetes Mother      Breast Cancer Paternal Grandmother      Pancreatic Cancer Paternal Grandmother      Cardiovascular Paternal Aunt      Cardiovascular Paternal Uncle      Depression Other      Alcoholism Father      Thyroid Disease Sister      Alcoholism Sister      Hypertension Maternal Grandmother      Heart Disease Sister         multiple ablations     Alcoholism Sister      Prostate Cancer Paternal Grandfather      ROS Pulmonary  Dyspnea: Yes, Cough: No, Chest pain: No, Wheezing: No, Sputum Production: No, Hemoptysis: No  A complete ROS was otherwise negative except as noted in the HPI.  /86   Pulse 90   Ht 1.676 m (5' 6\")   " Wt (!) 167.8 kg (370 lb)   SpO2 94%   BMI 59.72 kg/m    Exam:   GENERAL APPEARANCE: Well developed, well nourished, alert, and in no apparent distress.  EYES: PERRL, EOMI  HENT: Nasal mucosa with no edema and no hyperemia. No nasal polyps.  EARS: Canals clear, TMs normal  MOUTH: Oral mucosa is moist, without any lesions, no tonsillar enlargement, no oropharyngeal exudate.  NECK: supple, no masses, no thyromegaly.  LYMPHATICS: No significant axillary, cervical, or supraclavicular nodes.  RESP: normal percussion, good air flow throughout.  No crackles. No rhonchi. No wheezes.  CV: Normal S1, S2, regular rhythm, normal rate. No murmur.  No rub. No gallop. No LE edema.   ABDOMEN:  Bowel sounds normal, soft, nontender, no HSM or masses.   MS: extremities normal. No clubbing. No cyanosis.  SKIN: no rash on limited exam  NEURO: Mentation intact, speech normal, normal strength and tone, normal gait and stance  PSYCH: mentation appears normal. and affect normal/bright  Results:  Echo 12/23/19 normal LVEF and RV  6MWT today shows hypoxemia, needed 4 lpm to maintain SpO2  PFTs today show moderately severe restriction, moderately reduced diffusion.   CT chest today shows persistent bilateral groundglass opacities, left pleural effusion significant decline from prior testing.    Assessment and plan: Shira is a 60 yo female who is being seen in follow up. She was previously seen for nicotine dependence, dyspnea, obesity.  About 3 months ago, she had a cardiac arrest and was hospitalized.  She required ECMO therapy and the cardiac arrest was attributed to bilateral pulmonary emboli.  She has been out of the hospital and acute rehab since October, has been incredibly short of breath since discharge.  She has lost some weight, she is about 30 pounds down from her peak weight.  She has also quit smoking.    Dyspnea, hypoxic respiratory failure  - interstitial lung abnormalities -seen while she was hospitalized in persistent today.   The CT today was with contrast.  We will not do too much about this right now, continue to monitor.  But we did briefly discussed that rheumatoid arthritis and methotrexate are concerning in the face of this and we may need to consider rheumatoid arthritis associated interstitial lung disease or methotrexate associated toxicity.  I will repeat pulmonary function test and CT in about 3 months to assess.    - pleural effusion -she had a thoracentesis while hospitalized, the analysis from that fluid suggests a complicated exudative effusion with undetectable glucose, high LDH high protein and predominantly neutrophils.  She had the thoracentesis about 10 days after a positive sputum culture so I am sure she was receiving antibiotics at that time.  Nonetheless, the pleural effusion persists so we will perform an additional thoracentesis for drainage and analysis. Schedule thoracentesis, Thursday. Will need to bridge with Lovenox as follows: No more coumadin until after procedure. Take Lovenox tonight, Wed AM, Wed PM, NO Lovenox on Thursday PM. Resume Lovenox Friday morning. Resume warfarin Thursday evening.  I will communicate with the Coumadin clinic and Dr. Bragg.    Nicotine dependence-we discussed for 8 minutes today.  She has quit but still has cravings.  Her mom continues to smoke around her.  She still has Chantix at home.  I told her it would be okay  to take Chantix if she is still having cravings and has access via her mother.  Shira remains committed to abstinence.    Needs portable oxygen -likely related to both above, possible pulmonary hypertension.  Her VQ scan and echocardiogram are normal, which is good.  The PA diameter was reported to be elevated on the CT scan today, which is less specific.  I am ordering 4 L with exertion based on the 6-minute walk test performed today.  We will reassess 6-minute walk test at her next visit.  If dyspnea does not improve, may consider suggesting a right heart  catheterization to get gold standard measurement on her pulmonary artery pressures.      DME (Durable Medical Equipment) Orders and Documentation  Orders Placed This Encounter   Procedures     Home Oxygen Order      The patient was assessed and it was determined the patient is in need of the following listed DME Supplies/Equipment. Please complete supporting documentation below to demonstrate medical necessity.      Oxygen Use Documentation    I certify that this patient, Shira Rodriguez has been under my care and that I, or a nurse practitioner or physician's assistant working with me, had a face-to-face encounter that meets face-to-face encounter requirements with this patient on January 14, 2020.    Shira Rodriguez is now in a chronic stable state and continues to require supplemental oxygen due to continued oxygen desaturation.  This patient has been treated in part, or in whole for the following medical condition(s):  ILD J84.9  Pulmonary Embolism Chronic I27.82  Rheumatoid Lung Disease M05.10  Treatments tried and failed or ruled out to treat hypoxemia include diuretics, thoracentesis.  If portability is ordered, is the patient mobile within the home? yes    RTC 3 months with PFT and CT. Thoracentesis later this week

## 2020-01-14 NOTE — NURSING NOTE
Chief Complaint   Patient presents with     Follow Up     sob, abnormal ct, history of of pulmonary embolism     Vitals were taken and medications were reconciled.     MELISA Carmona

## 2020-01-14 NOTE — NURSING NOTE
RN faxed orders for home oxygen to [facility] per pt request. Pt was also scheduled for [facility] per Dr. Richardson on 1/16/20 at 12pm with check in at 11am. Pt advised to stop coumadin and bridge with Lovenox. Pt states feels comfortable as has done this in past. All questions answered.   Kayla Trinh RN BSN

## 2020-01-14 NOTE — PROGRESS NOTES
Addendum 20 Patient is going to have a thoracentesis on .  Patient was instructed by Dr. Valencia to hold warfarin today and tomorrow and bridge with Lovenox 150mg BID. Mariah Marvin RN  P [facility]             Patient is scheduled for a thoracentesis on .  Patient is bridging with Lovenox and instructed by Cassy Mejia to restart warfarin that evening.                        ANTICOAGULATION FOLLOW-UP CLINIC VISIT    Patient Name:  Shira Rodriguez  Date:  2020  Contact Type:  Telephone    SUBJECTIVE:         OBJECTIVE    INR   Date Value Ref Range Status   2020 1.62 (H) 0.86 - 1.14 Final       ASSESSMENT / PLAN  No question data found.  Anticoagulation Summary  As of 2020    INR goal:   2.0-3.0   TTR:   49.2 % (2.8 mo)   INR used for dosin.62! (2020)   Warfarin maintenance plan:   10 mg (5 mg x 2) every Tue, Fri; 7.5 mg (5 mg x 1.5) all other days   Full warfarin instructions:   10 mg every Tue, Fri; 7.5 mg all other days   Weekly warfarin total:   57.5 mg   Plan last modified:   Mariah Sellers RN (2020)   Next INR check:   2020   Priority:   High   Target end date:       Indications    Pulmonary emboli (H) [I26.99]             Anticoagulation Episode Summary     INR check location:       Preferred lab:       Send INR reminders to:   [facility]    Comments:   HIPPA Forms returned 19  OK to leave messages on Cell  [phone]      Anticoagulation Care Providers     Provider Role Specialty Phone number    Anjel Busby MD [facility] [phone]            See the Encounter Report to view Anticoagulation Flowsheet and Dosing Calendar (Go to Encounters tab in chart review, and find the Anticoagulation Therapy Visit)    Spoke with patient.     Mariah Sellers RN

## 2020-01-16 ENCOUNTER — HOSPITAL ENCOUNTER (OUTPATIENT)
Dept: PHYSICAL THERAPY | Facility: CLINIC | Age: 60
Setting detail: THERAPIES SERIES
End: 2020-01-16
Attending: PHYSICAL MEDICINE & REHABILITATION
Payer: COMMERCIAL

## 2020-01-16 ENCOUNTER — HOSPITAL ENCOUNTER (OUTPATIENT)
Facility: CLINIC | Age: 60
Discharge: HOME | End: 2020-01-16
Attending: INTERNAL MEDICINE | Admitting: INTERNAL MEDICINE
Payer: COMMERCIAL

## 2020-01-16 ENCOUNTER — APPOINTMENT (OUTPATIENT)
Dept: GENERAL RADIOLOGY | Facility: CLINIC | Age: 60
End: 2020-01-16
Attending: INTERNAL MEDICINE
Payer: COMMERCIAL

## 2020-01-16 ENCOUNTER — ANTICOAGULATION THERAPY VISIT (OUTPATIENT)
Dept: ANTICOAGULATION | Facility: CLINIC | Age: 60
End: 2020-01-16

## 2020-01-16 VITALS
DIASTOLIC BLOOD PRESSURE: 71 MMHG | SYSTOLIC BLOOD PRESSURE: 120 MMHG | HEART RATE: 92 BPM | RESPIRATION RATE: 21 BRPM | OXYGEN SATURATION: 97 %

## 2020-01-16 DIAGNOSIS — I27.82 CHRONIC SADDLE PULMONARY EMBOLISM WITHOUT ACUTE COR PULMONALE (H): ICD-10-CM

## 2020-01-16 DIAGNOSIS — I26.92 CHRONIC SADDLE PULMONARY EMBOLISM WITHOUT ACUTE COR PULMONALE (H): ICD-10-CM

## 2020-01-16 LAB
APPEARANCE FLD: NORMAL
B-HCG FREE SERPL-ACNC: 1.7 [IU]/L (ref 0.86–1.14)
COLOR FLD: NORMAL
DLCOUNC-%PRED-PRE: 64 %
DLCOUNC-PRE: 14.14 ML/MIN/MMHG
DLCOUNC-PRED: 21.79 ML/MIN/MMHG
ERV-%PRED-PRE: -94 %
ERV-PRE: 0.05 L
ERV-PRED: -0.06 L
EXPTIME-PRE: 5.44 SEC
FEF2575-%PRED-PRE: 66 %
FEF2575-PRE: 1.65 L/SEC
FEF2575-PRED: 2.47 L/SEC
FEFMAX-%PRED-PRE: 69 %
FEFMAX-PRE: 4.65 L/SEC
FEFMAX-PRED: 6.71 L/SEC
FEV1-%PRED-PRE: 53 %
FEV1-PRE: 1.47 L
FEV1FEV6-PRE: 85 %
FEV1FEV6-PRED: 81 %
FEV1FVC-PRE: 85 %
FEV1FVC-PRED: 79 %
FEV1SVC-PRE: 71 %
FEV1SVC-PRED: 76 %
FIFMAX-PRE: 3.04 L/SEC
FRCPLETH-%PRED-PRE: 56 %
FRCPLETH-PRE: 1.58 L
FRCPLETH-PRED: 2.81 L
FVC-%PRED-PRE: 49 %
FVC-PRE: 1.73 L
FVC-PRED: 3.46 L
GLUCOSE FLD-MCNC: <1 MG/DL
GRAM STN SPEC: NORMAL
GRAM STN SPEC: NORMAL
IC-%PRED-PRE: 55 %
IC-PRE: 2 L
IC-PRED: 3.62 L
LDH FLD L TO P-CCNC: 1469 U/L
LYMPHOCYTES NFR FLD MANUAL: 2 %
MONOS+MACROS NFR FLD MANUAL: 27 %
NEUTS BAND NFR FLD MANUAL: 71 %
PH FLD: 7.1 PH
RVPLETH-%PRED-PRE: 77 %
RVPLETH-PRE: 1.52 L
RVPLETH-PRED: 1.96 L
SPECIMEN SOURCE FLD: NORMAL
SPECIMEN SOURCE: NORMAL
TLCPLETH-%PRED-PRE: 67 %
TLCPLETH-PRE: 3.58 L
TLCPLETH-PRED: 5.27 L
VA-%PRED-PRE: 54 %
VA-PRE: 2.84 L
VC-%PRED-PRE: 57 %
VC-PRE: 2.06 L
VC-PRED: 3.57 L
WBC # FLD AUTO: 1546 /UL

## 2020-01-16 PROCEDURE — 87070 CULTURE OTHR SPECIMN AEROBIC: CPT | Performed by: INTERNAL MEDICINE

## 2020-01-16 PROCEDURE — 88342 IMHCHEM/IMCYTCHM 1ST ANTB: CPT | Performed by: INTERNAL MEDICINE

## 2020-01-16 PROCEDURE — 83615 LACTATE (LD) (LDH) ENZYME: CPT | Performed by: INTERNAL MEDICINE

## 2020-01-16 PROCEDURE — 88305 TISSUE EXAM BY PATHOLOGIST: CPT | Performed by: INTERNAL MEDICINE

## 2020-01-16 PROCEDURE — 97110 THERAPEUTIC EXERCISES: CPT | Mod: GP | Performed by: PHYSICAL THERAPIST

## 2020-01-16 PROCEDURE — 87205 SMEAR GRAM STAIN: CPT | Performed by: INTERNAL MEDICINE

## 2020-01-16 PROCEDURE — 89051 BODY FLUID CELL COUNT: CPT | Performed by: INTERNAL MEDICINE

## 2020-01-16 PROCEDURE — 85610 PROTHROMBIN TIME: CPT

## 2020-01-16 PROCEDURE — 88341 IMHCHEM/IMCYTCHM EA ADD ANTB: CPT | Performed by: INTERNAL MEDICINE

## 2020-01-16 PROCEDURE — 40000986 XR CHEST PORT 1 VW

## 2020-01-16 PROCEDURE — 32554 ASPIRATE PLEURA W/O IMAGING: CPT | Performed by: INTERNAL MEDICINE

## 2020-01-16 PROCEDURE — 00000102 ZZHCL STATISTIC CYTO WRIGHT STAIN TC: Performed by: INTERNAL MEDICINE

## 2020-01-16 PROCEDURE — 88112 CYTOPATH CELL ENHANCE TECH: CPT | Performed by: INTERNAL MEDICINE

## 2020-01-16 PROCEDURE — 83986 ASSAY PH BODY FLUID NOS: CPT | Performed by: INTERNAL MEDICINE

## 2020-01-16 PROCEDURE — 82945 GLUCOSE OTHER FLUID: CPT | Performed by: INTERNAL MEDICINE

## 2020-01-16 PROCEDURE — 00000155 ZZHCL STATISTIC H-CELL BLOCK W/STAIN: Performed by: INTERNAL MEDICINE

## 2020-01-16 NOTE — PROGRESS NOTES
Patient was instructed to restart warfarin tonight and Lovenox tomorrow by Cassy Richardson.  Instructions were given to have an INR check on 1/20 or 1/21. BRENDAN

## 2020-01-16 NOTE — PROCEDURES
Thoracentesis    Consent:   Obtained after discussion of risk/benefit/alternatives and all questions answered to the best of my knowledge. This signed consent is in the chart. Yes     Universal Protocol: Patient Identification was verified, time out was performed, site marking was done, Imaging data Yes. Full Barrier precaution done: Hands washed, mask, gloves, gown, cap and eye protection all used.    Indication:    Effusion     Narrative:  Ultrasound was used to locate area of interest and fransisco skin:YES   .   Left: posterior hemithorax prepped with chlorhexadine and draped in a sterile fashion. 1% lidocaine injected subcutaneously for local anesthesia. 22G needle used to anesthetize the pleural space above the 9th rib with aspiration of nadine fluid. Using standard thoracentesis kit, catheter needle device inserted above rib without difficulty. A total of 550 ml removed. Catheter removed from pleural space and adhesive bandage applied.      A chest Xray was then ordered. NO pneumothorax seen.     Complications: none    Observations: mild chest pain after thoracentesis, improved with deep breathing.    Procedure performed by Dr Richardson 1/16/2020

## 2020-01-16 NOTE — DISCHARGE INSTRUCTIONS
Discharge Instructions for Thoracentesis  Thoracentesis is a procedure that removes extra fluid (pleural effusion) from the pleural space. This space is between the outside surface of the lungs (pleura) and the chest wall. The procedure may be done to take a sample of the fluid for testing to help find the cause. Or it may be done to drain the extra fluid if you are having trouble breathing.  Home care    You may have some pain after the procedure. Your doctor can prescribe or recommend pain medicine for you to take at home, if needed. Take these exactly as directed. If you stopped taking other medicines before the procedure, ask your doctor when you can start them again.    Take it easy for 48 hours after the procedure. Don't do anything active until your doctor says it s OK.    Don't do strenuous activities, such as lifting, until your doctor says it s OK.    You will have a small bandage over the puncture site. You may remove the bandage in 24 hours.    Check the puncture site for the signs of infection listed below.    Follow-up  Make a follow-up appointment with your doctor as directed. During your follow-up visit, your doctor will check your healing. Be sure to let your doctor know how you are feeling.  When to call your doctor  Call your doctor right away if you have any of the following:    Coughing up blood    Chest pain. If chest pain suddenly gets worse, it may be an emergency.    Shortness of breath    Fever of 100.4 F (38 C) or higher, or as directed by your healthcare provider    Pain that doesn't get better after taking pain medicine    Signs of infection at the puncture site. These include increased pain, redness, swelling, or warmth.    Fluid draining from the puncture site    Bleeding at the puncture site  Date Last Reviewed: 10/1/2016    5591-5347 [employer]. [address] [place] [address], [place] [address]. All rights reserved. This information is not intended as a substitute for  professional medical care. Always follow your healthcare professional's instructions.

## 2020-01-16 NOTE — OR NURSING
Post procedure in recovery pt c/o pain 6/10 generalized left side upon inhalation. Dr. Richardson ordered 02 and stated will come see pt. Will continue to monitor pt closely.

## 2020-01-16 NOTE — OR NURSING
Dr Richardson evaluated pt and portable cxr obtained.  Pt states feeling better at this time.  Radiology confirmed no pneumothorax per chest xray.  Pt to be discharged to home at this time.

## 2020-01-17 NOTE — PROGRESS NOTES
01/16/20 0854   Signing Clinician's Name / Credentials   Signing clinician's name / credentials Stan Urbina DPT   Session Number   Session Number 13/20 (8 used previous EOC) - [employer], 1/20 in 2020   Progress Note/Recertification   Progress Note Due Date 01/15/20   Adult Goals   PT Eval Goals 1;2;3;4;5   Goal 1   Goal Identifier TUG   Goal Description Pt to demo TUG in <8'' to show improving functional mobility status and reduced fall risk (baseline 17.89'' with FWW).   Target Date 03/11/20   Goal 2   Goal Identifier Gait Speed   Goal Description Pt will demo gait speed of >1.1 m/sec to show improving functional community speeds and reduced fall risk (baseline 0.67 m/sec with FWW).   Target Date 03/11/20   Goal 3   Goal Identifier 30'' Chair Stand   Goal Description Pt to demo 12 or more reps chair stand from 18'' surface in 30'' to show improving strength and and functional mobility status (baseline 6 reps).   Target Date 03/11/20   Goal 4   Goal Identifier Balance   Goal Description Pt to demo ability to safely reach to floor to  items without UE support, no LOB, for improved stability with ADLs and functional mobility.   Target Date 01/15/20   Date Met 01/16/20   Goal 5   Goal Identifier HEP   Goal Description Pt will demo (I) with HEP for ongoing management/improvement in condition and optimal QOL.   Target Date 03/11/20   Subjective Report   Subjective Report Had chest x-ray, echo, and VQ scan, found that L lobe infiltrates have remained, not improved since hospital discharge.  Underwent CT scan, pulm function test, and 6 min walk tests.  Now on supplemental O2, just delivered yesterday, using 4lpm with exertion. States her O2 saturations were dropping to mid-80's with 6MWT.  Has scheduled thoracentesis scheduled for later today. Did also have R knee injection, much improved pain reported, only limited by breathing status currently.   Objective Measure 1   Objective Measure 30'' Chair  stand   Details 9 reps from 18'' plinth surface (6 reps at eval, 7 reps from 17'' surface 12/10/19)   Objective Measure 2   Objective Measure Gait Speed   Details 0.80 m/sec, no AD (improved from eval 0.67 m/sec, but regressed from 12/10/19 0.85 m/sec)   Objective Measure 3   Objective Measure TUG   Details 10.63'' no AD (improved from eval 17.89'' but regressed from 12/10/19 9.1'').   Vitals Signs   Heart Rate 92  (at rest)   SpO2 96  (at rest)   Vital Signs Comments Wearing 4lpm nc O2. SaO2 down to 91% on 4lpm with exertion, HR up to 124bpm.   Therapeutic Procedure/exercise   Therapeutic Procedures: strength, endurance, ROM, flexibillity minutes (53759) 28   Skilled Intervention monitoring vitals, ed on activity, HEP, O2 needs   Patient Response limited by SOB, frequent rest breaks, SaO2 91% with exertion of 4lpm   Treatment Detail Reassessment of TUG, GS, and 30'' chair stand measures, all regressed from 12/2019,  but improved overall from eval.  Time spent reviewing proper PLB technique, pacing strategies with activity.  WBOS overhead reaching with deep breathing, reviewed proper use of IS at home. Amb bouts x 50' and 100' needing frequent standing rest breaks every ~75' due to SOB, no AD, good overall stability.  Chair stands x 9 reps, limited by SOB.  Reviewed doorway stretch for anterior chest wall.     Progress All goals remain in progress, pt has regressed from most recent assesment in 12/2019 with ongoing worsening SOB/SANTOS. Now on 4lpm O2.  Overall gait stability and strength have improved, steady gait, no need for AD now.   Neuromuscular Re-education   Progress goal 4 met   Education   Learner Patient   Readiness Acceptance   Method Explanation;Demonstration   Response Verbalizes Understanding;Demonstrates Understanding   Education Comments HEP, monitoring symptoms, POC   Plan   Homework focus on PLB, pacing strategies, doorway stretch, standing overhead reaching with deep breathing, IS use   Plan for  next session reassess activity tolerance s/p thoracentesis on 1/16, progress functional strengthening, gait/speed training, dynamic balance training   Comments   Comments Shira has been seen for a total of 13 PT visits during this EOC to address deconditioned status following complex hospitalization/medical event.  Shira has shown continued steady progress throughout EOC, although over past 1-1.5 months has been struggling with increased SOB/SANTOS and elevated HR. She has followed up with her primary care team and will be undergoing thoracentesis on 1/16/20.  POC remains appropriate and pt will benefit from ongoing skilled PT intervention to optimize recovery, safety with mobility needs, and return to independent and tolerated functional mobility status.  Will continue POC 2x/wk with reducing frequency as indicated.   Total Session Time   Timed Code Treatment Minutes 28   Total Treatment Time (sum of timed and untimed services) 28   AMBULATORY CLINICS ONLY-MEDICAL AND TREATMENT DIAGNOSIS   Medical Diagnosis Cardiac arrest (H) I46.9, Pulmonary embolism, unspecified chronicity, unspecified pulmonary embolism type, unspecified whether acute cor pulmonale present (H) I26.99    PT Diagnosis Deconditioned status with impaired functional mobility

## 2020-01-20 DIAGNOSIS — M05.742 RHEUMATOID ARTHRITIS INVOLVING BOTH HANDS WITH POSITIVE RHEUMATOID FACTOR (H): ICD-10-CM

## 2020-01-20 DIAGNOSIS — M05.741 RHEUMATOID ARTHRITIS INVOLVING BOTH HANDS WITH POSITIVE RHEUMATOID FACTOR (H): ICD-10-CM

## 2020-01-20 RX ORDER — METHOTREXATE 25 MG/ML
25 INJECTION, SOLUTION INTRA-ARTERIAL; INTRAMUSCULAR; INTRAVENOUS
Qty: 12 ML | Refills: 1 | OUTPATIENT
Start: 2020-01-20

## 2020-01-21 LAB
BACTERIA SPEC CULT: NO GROWTH
COPATH REPORT: NORMAL
SPECIMEN SOURCE: NORMAL

## 2020-01-22 ENCOUNTER — ANTICOAGULATION THERAPY VISIT (OUTPATIENT)
Dept: ANTICOAGULATION | Facility: CLINIC | Age: 60
End: 2020-01-22

## 2020-01-22 DIAGNOSIS — I26.92 CHRONIC SADDLE PULMONARY EMBOLISM WITHOUT ACUTE COR PULMONALE (H): ICD-10-CM

## 2020-01-22 DIAGNOSIS — I27.82 CHRONIC SADDLE PULMONARY EMBOLISM WITHOUT ACUTE COR PULMONALE (H): ICD-10-CM

## 2020-01-22 DIAGNOSIS — I26.99 PULMONARY EMBOLISM, UNSPECIFIED CHRONICITY, UNSPECIFIED PULMONARY EMBOLISM TYPE, UNSPECIFIED WHETHER ACUTE COR PULMONALE PRESENT (H): ICD-10-CM

## 2020-01-22 LAB
CAPILLARY BLOOD COLLECTION: NORMAL
INR BLD: 1.4 (ref 0.86–1.14)

## 2020-01-22 PROCEDURE — 36416 COLLJ CAPILLARY BLOOD SPEC: CPT | Performed by: FAMILY MEDICINE

## 2020-01-22 PROCEDURE — 85610 PROTHROMBIN TIME: CPT | Mod: QW | Performed by: FAMILY MEDICINE

## 2020-01-22 NOTE — PROGRESS NOTES
ANTICOAGULATION FOLLOW-UP CLINIC VISIT    Patient Name:  Shira Rodriguez  Date:  2020  Contact Type:  Telephone    SUBJECTIVE:         OBJECTIVE    INR Point of Care   Date Value Ref Range Status   2020 1.4 (H) 0.86 - 1.14 Final     Comment:     This test is intended for monitoring Coumadin therapy.  Results are not   accurate in patients with prolonged INR due to factor deficiency.         ASSESSMENT / PLAN  INR assessment SUB    Recheck INR In: 2 DAYS    INR Location Clinic      Anticoagulation Summary  As of 2020    INR goal:   2.0-3.0   TTR:   45.0 % (3.1 mo)   INR used for dosin.4! (2020)   Warfarin maintenance plan:   10 mg (5 mg x 2) every Tue, Fri; 7.5 mg (5 mg x 1.5) all other days   Full warfarin instructions:   : 10 mg; : 10 mg; Otherwise 10 mg every Tue, Fri; 7.5 mg all other days   Weekly warfarin total:   57.5 mg   Plan last modified:   Mariah Sellers RN (2020)   Next INR check:   2020   Priority:   High   Target end date:       Indications    Pulmonary emboli (H) [I26.99]             Anticoagulation Episode Summary     INR check location:       Preferred lab:       Send INR reminders to:   [facility]    Comments:   HIPPA Forms returned 19  OK to leave messages on Cell  [phone]      Anticoagulation Care Providers     Provider Role Specialty Phone number    JakesavAnjel pelaez MD [facility] [phone]            See the Encounter Report to view Anticoagulation Flowsheet and Dosing Calendar (Go to Encounters tab in chart review, and find the Anticoagulation Therapy Visit)  Left message for patient with results and dosing recommendations. Asked patient to call back to report any missed doses, falls, signs and symptoms of bleeding or clotting, any changes in health, medication, or diet. Asked patient to call back with any questions or concerns.  Lovenox 150mg bid is to continue.    Fauzia Gonzales RN

## 2020-01-24 ENCOUNTER — ANTICOAGULATION THERAPY VISIT (OUTPATIENT)
Dept: ANTICOAGULATION | Facility: CLINIC | Age: 60
End: 2020-01-24

## 2020-01-24 ENCOUNTER — OFFICE VISIT (OUTPATIENT)
Dept: WOUND CARE | Facility: CLINIC | Age: 60
End: 2020-01-24
Payer: COMMERCIAL

## 2020-01-24 DIAGNOSIS — I26.92 CHRONIC SADDLE PULMONARY EMBOLISM WITHOUT ACUTE COR PULMONALE (H): ICD-10-CM

## 2020-01-24 DIAGNOSIS — S31.109D WOUND OF LEFT GROIN, SUBSEQUENT ENCOUNTER: Primary | ICD-10-CM

## 2020-01-24 DIAGNOSIS — I27.82 CHRONIC SADDLE PULMONARY EMBOLISM WITHOUT ACUTE COR PULMONALE (H): ICD-10-CM

## 2020-01-24 DIAGNOSIS — I26.99 PULMONARY EMBOLISM, UNSPECIFIED CHRONICITY, UNSPECIFIED PULMONARY EMBOLISM TYPE, UNSPECIFIED WHETHER ACUTE COR PULMONALE PRESENT (H): ICD-10-CM

## 2020-01-24 LAB — INR PPP: 1.32 (ref 0.86–1.14)

## 2020-01-24 NOTE — PROGRESS NOTES
ANTICOAGULATION FOLLOW-UP CLINIC VISIT    Patient Name:  Shira Rodriguez  Date:  2020  Contact Type:  Telephone    SUBJECTIVE:  Patient Findings     Comments:   Spoke with Shira.  She ran out of lovenox after taking 6 doses and did not  refill.  She will  more lovenox tonight and start back on it.        Clinical Outcomes     Comments:   Spoke with Shira.  She ran out of lovenox after taking 6 doses and did not  refill.  She will  more lovenox tonight and start back on it.           OBJECTIVE    INR   Date Value Ref Range Status   2020 1.32 (H) 0.86 - 1.14 Final       ASSESSMENT / PLAN  INR assessment SUB    Recheck INR In: 3 DAYS    INR Location Clinic      Anticoagulation Summary  As of 2020    INR goal:   2.0-3.0   TTR:   44.0 % (3.2 mo)   INR used for dosin.32! (2020)   Warfarin maintenance plan:   10 mg (5 mg x 2) every Tue, Fri; 7.5 mg (5 mg x 1.5) all other days   Full warfarin instructions:   : 10 mg; Otherwise 10 mg every Tue, Fri; 7.5 mg all other days   Weekly warfarin total:   57.5 mg   Plan last modified:   Mariah Sellers RN (2020)   Next INR check:   2020   Priority:   High   Target end date:       Indications    Pulmonary emboli (H) [I26.99]             Anticoagulation Episode Summary     INR check location:       Preferred lab:       Send INR reminders to:   [facility]    Comments:   HIPPA Forms returned 19  OK to leave messages on Cell  [phone]      Anticoagulation Care Providers     Provider Role Specialty Phone number    Anjel Busby MD [facility] [phone]            See the Encounter Report to view Anticoagulation Flowsheet and Dosing Calendar (Go to Encounters tab in chart review, and find the Anticoagulation Therapy Visit)    Spoke with Shira.  Verified that she has been taking warfarin dose that was recommended.  She will continue on lovenox 150 mg BID until INR is  therapeutic.  She is picking up lovenox today and will have enough to get through the weekend.    Tea Deng RN

## 2020-01-24 NOTE — PROGRESS NOTES
Patient comes to wound clinic for wound assessment per request of dr. Cardona. She has history of a Open surgical wound due to ECMO : DISCHARGE DIAGNOSIS Pulmonary EmbolismPEA ArrestBilateral pleural effusionsVolume overloadSinus pauseHTN Sleep apnea  Left groin wound Pannus wound Clean gloves are donned and current dressing removed. Previous treatment includes: Polymem    First date of treatment: 10/18/19    Objective findings: improved    Location: left  groin      Open Post-operative wound: Yes    Wound measured.: 0.5 cm x 0.5 cm x 0.1 cm , Stage: Stage 3: Full thickness skin loss with subcutaneous involvement     Wound description: no sign of infection    Tenderness:mildly    Odor: none    Drainage is moderate, serosanguinous     Tunneling:  N/A      Undermining: N/A    Wound Base: granulation and slough     Subjective finding:     Pt states: doing well    Patient is assessed for discomfort which is: minimal    Today's status of the wound: healing    Treatment: Removal of existing dressing, Visual inspection, Cleansing with Microklenz spray,, Wound packing with HydroferaBlue dressing   Application of clean dressing.  Pt received the following instructions: Cleansing with soap and water or Microklenz spray,   Apply Mesalt and bandage Secure with: tape. Frequency:  daily   Signs and symptoms of infection taught.  Patient needs to be seen 2 weeks. Treated and follow-up appointment made. Annette Smart was available for supervision of care if needed or if questions should arise and regarding plan of care.  Margot Dye RN, CWON

## 2020-01-27 ENCOUNTER — ANTICOAGULATION THERAPY VISIT (OUTPATIENT)
Dept: ANTICOAGULATION | Facility: CLINIC | Age: 60
End: 2020-01-27

## 2020-01-27 DIAGNOSIS — I26.99 PULMONARY EMBOLISM, UNSPECIFIED CHRONICITY, UNSPECIFIED PULMONARY EMBOLISM TYPE, UNSPECIFIED WHETHER ACUTE COR PULMONALE PRESENT (H): ICD-10-CM

## 2020-01-27 DIAGNOSIS — I26.92 CHRONIC SADDLE PULMONARY EMBOLISM WITHOUT ACUTE COR PULMONALE (H): ICD-10-CM

## 2020-01-27 DIAGNOSIS — I27.82 CHRONIC SADDLE PULMONARY EMBOLISM WITHOUT ACUTE COR PULMONALE (H): ICD-10-CM

## 2020-01-27 LAB
CAPILLARY BLOOD COLLECTION: NORMAL
INR BLD: 1.8 (ref 0.86–1.14)

## 2020-01-27 PROCEDURE — 36416 COLLJ CAPILLARY BLOOD SPEC: CPT | Performed by: FAMILY MEDICINE

## 2020-01-27 PROCEDURE — 85610 PROTHROMBIN TIME: CPT | Mod: QW | Performed by: FAMILY MEDICINE

## 2020-01-27 NOTE — PROGRESS NOTES
ANTICOAGULATION FOLLOW-UP CLINIC VISIT    Patient Name:  Shira Rodriguez  Date:  2020  Contact Type:  Telephone    SUBJECTIVE:  Patient Findings     Comments:   Patient reports that she has 1 syringe of Lovenox left and will not refill it.          Clinical Outcomes     Comments:   Patient reports that she has 1 syringe of Lovenox left and will not refill it.             OBJECTIVE    INR Point of Care   Date Value Ref Range Status   2020 1.8 (H) 0.86 - 1.14 Final     Comment:     This test is intended for monitoring Coumadin therapy.  Results are not   accurate in patients with prolonged INR due to factor deficiency.         ASSESSMENT / PLAN  No question data found.  Anticoagulation Summary  As of 2020    INR goal:   2.0-3.0   TTR:   42.7 % (3.3 mo)   INR used for dosin.8! (2020)   Warfarin maintenance plan:   10 mg (5 mg x 2) every Tue, Fri; 7.5 mg (5 mg x 1.5) all other days   Full warfarin instructions:   : 10 mg; Otherwise 10 mg every Tue, Fri; 7.5 mg all other days   Weekly warfarin total:   57.5 mg   Plan last modified:   Mariah Sellers RN (2020)   Next INR check:   2/3/2020   Priority:   High   Target end date:       Indications    Pulmonary emboli (H) [I26.99]             Anticoagulation Episode Summary     INR check location:       Preferred lab:       Send INR reminders to:   [facility]    Comments:   HIPPA Forms returned 19  OK to leave messages on Cell  [phone]      Anticoagulation Care Providers     Provider Role Specialty Phone number    Anjel Busby MD [facility] [phone]            See the Encounter Report to view Anticoagulation Flowsheet and Dosing Calendar (Go to Encounters tab in chart review, and find the Anticoagulation Therapy Visit)    Spoke with patient.     Mariah Sellers RN

## 2020-01-28 ENCOUNTER — HOSPITAL ENCOUNTER (OUTPATIENT)
Dept: PHYSICAL THERAPY | Facility: CLINIC | Age: 60
Setting detail: THERAPIES SERIES
End: 2020-01-28
Attending: PHYSICAL MEDICINE & REHABILITATION
Payer: COMMERCIAL

## 2020-01-28 PROCEDURE — 97110 THERAPEUTIC EXERCISES: CPT | Mod: GP | Performed by: PHYSICAL THERAPIST

## 2020-01-31 DIAGNOSIS — E87.70 HYPERVOLEMIA, UNSPECIFIED HYPERVOLEMIA TYPE: ICD-10-CM

## 2020-01-31 RX ORDER — FUROSEMIDE 40 MG
80 TABLET ORAL DAILY
Qty: 180 TABLET | Refills: 1 | Status: SHIPPED | OUTPATIENT
Start: 2020-01-31 | End: 2021-06-16

## 2020-01-31 NOTE — TELEPHONE ENCOUNTER
Fax received from pharmacy requesting updated furosemide script. Current script is for 40mg daily, though patient is taking 80mg daily. Notes reviewed. Prescription refilled for furosemide 80mg daily.    Georgette Salgado RN ([facility])

## 2020-02-04 ENCOUNTER — HOSPITAL ENCOUNTER (OUTPATIENT)
Dept: PHYSICAL THERAPY | Facility: CLINIC | Age: 60
Setting detail: THERAPIES SERIES
End: 2020-02-04
Attending: PHYSICAL MEDICINE & REHABILITATION
Payer: COMMERCIAL

## 2020-02-04 ENCOUNTER — TELEPHONE (OUTPATIENT)
Dept: PULMONOLOGY | Facility: CLINIC | Age: 60
End: 2020-02-04

## 2020-02-04 ENCOUNTER — ANTICOAGULATION THERAPY VISIT (OUTPATIENT)
Dept: ANTICOAGULATION | Facility: CLINIC | Age: 60
End: 2020-02-04

## 2020-02-04 DIAGNOSIS — J96.01 ACUTE RESPIRATORY FAILURE WITH HYPOXIA (H): ICD-10-CM

## 2020-02-04 DIAGNOSIS — I26.99 PULMONARY EMBOLISM, UNSPECIFIED CHRONICITY, UNSPECIFIED PULMONARY EMBOLISM TYPE, UNSPECIFIED WHETHER ACUTE COR PULMONALE PRESENT (H): ICD-10-CM

## 2020-02-04 DIAGNOSIS — R93.89 ABNORMAL CHEST CT: ICD-10-CM

## 2020-02-04 DIAGNOSIS — I27.82 CHRONIC SADDLE PULMONARY EMBOLISM WITHOUT ACUTE COR PULMONALE (H): ICD-10-CM

## 2020-02-04 DIAGNOSIS — I26.92 CHRONIC SADDLE PULMONARY EMBOLISM WITHOUT ACUTE COR PULMONALE (H): ICD-10-CM

## 2020-02-04 DIAGNOSIS — R94.2 ABNORMAL PFT: ICD-10-CM

## 2020-02-04 LAB
CAPILLARY BLOOD COLLECTION: NORMAL
INR BLD: 1.7 (ref 0.86–1.14)

## 2020-02-04 PROCEDURE — 97110 THERAPEUTIC EXERCISES: CPT | Mod: GP | Performed by: PHYSICAL THERAPIST

## 2020-02-04 PROCEDURE — 85610 PROTHROMBIN TIME: CPT | Mod: QW | Performed by: FAMILY MEDICINE

## 2020-02-04 PROCEDURE — 36416 COLLJ CAPILLARY BLOOD SPEC: CPT | Performed by: FAMILY MEDICINE

## 2020-02-04 NOTE — PROGRESS NOTES
ANTICOAGULATION FOLLOW-UP CLINIC VISIT    Patient Name:  Shira Rodriguez  Date:  2020  Contact Type:  Telephone    SUBJECTIVE:  Patient Findings     Positives:   Missed doses (-missed coumadin 7.5mg)             OBJECTIVE    INR Point of Care   Date Value Ref Range Status   2020 1.7 (H) 0.86 - 1.14 Final     Comment:     This test is intended for monitoring Coumadin therapy.  Results are not   accurate in patients with prolonged INR due to factor deficiency.         ASSESSMENT / PLAN  INR assessment SUB    Recheck INR In: 1 WEEK    INR Location Clinic      Anticoagulation Summary  As of 2020    INR goal:   2.0-3.0   TTR:   39.5 % (3.5 mo)   INR used for dosin.7! (2020)   Warfarin maintenance plan:   10 mg (5 mg x 2) every Tue, Fri; 7.5 mg (5 mg x 1.5) all other days   Full warfarin instructions:   10 mg every Tue, Fri; 7.5 mg all other days   Weekly warfarin total:   57.5 mg   Plan last modified:   Mariah Sellers RN (2020)   Next INR check:   2020   Priority:   High   Target end date:       Indications    Pulmonary emboli (H) [I26.99]             Anticoagulation Episode Summary     INR check location:       Preferred lab:       Send IN[facility] reminders to:   [facility]    Comments:   HIPPA Forms returned 19  OK to leave messages on Cell  [phone]      Anticoagulation Care Providers     Provider Role Specialty Phone number    JakesavAnjel pelaez MD [facility] [phone]            See the Encounter Report to view Anticoagulation Flowsheet and Dosing Calendar (Go to Encounters tab in chart review, and find the Anticoagulation Therapy Visit)  Spoke with patient.  She plans to take her coumadin earlier in the day with her other meds so she doesn't miss any more doses of coumadin.    Fauzia Gonzales RN

## 2020-02-06 DIAGNOSIS — Z79.899 HIGH RISK MEDICATION USE: ICD-10-CM

## 2020-02-06 DIAGNOSIS — M05.79 RHEUMATOID ARTHRITIS INVOLVING MULTIPLE SITES WITH POSITIVE RHEUMATOID FACTOR (H): ICD-10-CM

## 2020-02-06 DIAGNOSIS — I26.09 PULMONARY EMBOLISM WITH ACUTE COR PULMONALE, UNSPECIFIED CHRONICITY, UNSPECIFIED PULMONARY EMBOLISM TYPE (H): ICD-10-CM

## 2020-02-06 RX ORDER — WARFARIN SODIUM 5 MG/1
TABLET ORAL
Qty: 60 TABLET | Refills: 1 | Status: SHIPPED | OUTPATIENT
Start: 2020-02-06 | End: 2020-03-03

## 2020-02-09 RX ORDER — FOLIC ACID 1 MG/1
3-4 TABLET ORAL DAILY
Qty: 120 TABLET | Refills: 7 | Status: SHIPPED | OUTPATIENT
Start: 2020-02-09 | End: 2020-02-18

## 2020-02-09 NOTE — TELEPHONE ENCOUNTER
folic acid (FOLVITE) 1 MG tablet  Last Written Prescription Date:  1/10/20  Last Fill Quantity: 120,   # refills: 0  Last Office Visit : 10/23/19  Future Office visit:  2/18/20

## 2020-02-10 ENCOUNTER — DOCUMENTATION ONLY (OUTPATIENT)
Dept: CARE COORDINATION | Facility: CLINIC | Age: 60
End: 2020-02-10

## 2020-02-11 ENCOUNTER — HOSPITAL ENCOUNTER (OUTPATIENT)
Dept: PHYSICAL THERAPY | Facility: CLINIC | Age: 60
Setting detail: THERAPIES SERIES
End: 2020-02-11
Attending: PHYSICAL MEDICINE & REHABILITATION
Payer: COMMERCIAL

## 2020-02-11 PROCEDURE — 97110 THERAPEUTIC EXERCISES: CPT | Mod: GP | Performed by: PHYSICAL THERAPIST

## 2020-02-12 ENCOUNTER — OFFICE VISIT (OUTPATIENT)
Dept: WOUND CARE | Facility: CLINIC | Age: 60
End: 2020-02-12
Payer: COMMERCIAL

## 2020-02-12 ENCOUNTER — ANTICOAGULATION THERAPY VISIT (OUTPATIENT)
Dept: ANTICOAGULATION | Facility: CLINIC | Age: 60
End: 2020-02-12

## 2020-02-12 DIAGNOSIS — I26.92 CHRONIC SADDLE PULMONARY EMBOLISM WITHOUT ACUTE COR PULMONALE (H): ICD-10-CM

## 2020-02-12 DIAGNOSIS — I27.82 CHRONIC SADDLE PULMONARY EMBOLISM WITHOUT ACUTE COR PULMONALE (H): ICD-10-CM

## 2020-02-12 DIAGNOSIS — S31.109D WOUND OF LEFT GROIN, SUBSEQUENT ENCOUNTER: Primary | ICD-10-CM

## 2020-02-12 DIAGNOSIS — I26.99 PULMONARY EMBOLISM, UNSPECIFIED CHRONICITY, UNSPECIFIED PULMONARY EMBOLISM TYPE, UNSPECIFIED WHETHER ACUTE COR PULMONALE PRESENT (H): ICD-10-CM

## 2020-02-12 LAB — INR PPP: 1.98 (ref 0.86–1.14)

## 2020-02-12 NOTE — PROGRESS NOTES
ANTICOAGULATION FOLLOW-UP CLINIC VISIT    Patient Name:  Shira Rodriguez  Date:  2020  Contact Type:  Telephone    SUBJECTIVE:         OBJECTIVE    INR   Date Value Ref Range Status   2020 1.98 (H) 0.86 - 1.14 Final       ASSESSMENT / PLAN  No question data found.  Anticoagulation Summary  As of 2020    INR goal:   2.0-3.0   TTR:   36.7 % (3.8 mo)   INR used for dosin.98! (2020)   Warfarin maintenance plan:   10 mg (5 mg x 2) every Mon, Wed, Fri; 7.5 mg (5 mg x 1.5) all other days   Full warfarin instructions:   10 mg every Mon, Wed, Fri; 7.5 mg all other days   Weekly warfarin total:   60 mg   Plan last modified:   Mariah Sellers RN (2020)   Next INR check:   2020   Priority:   High   Target end date:       Indications    Pulmonary emboli (H) [I26.99]             Anticoagulation Episode Summary     INR check location:       Preferred lab:       Send INR reminders to:   [facility]    Comments:   HIPPA Forms returned 19  OK to leave messages on Cell  [phone]      Anticoagulation Care Providers     Provider Role Specialty Phone number    JakesavAnjel pelaez MD [facility] [phone]            See the Encounter Report to view Anticoagulation Flowsheet and Dosing Calendar (Go to Encounters tab in chart review, and find the Anticoagulation Therapy Visit)    Left message for patient with results and dosing recommendations. Asked patient to call back to report any missed doses, falls, signs and symptoms of bleeding or clotting, any changes in health, medication, or diet. Asked patient to call back with any questions or concerns.      Mariah Sellers RN

## 2020-02-12 NOTE — PROGRESS NOTES
Patient comes to wound clinic for wound assessment per request of dr. Cardona. She has history of a Open surgical wound due to ECMO : DISCHARGE DIAGNOSIS Pulmonary EmbolismPEA ArrestBilateral pleural effusionsVolume overloadSinus pauseHTN Sleep apnea  Left groin wound Pannus wound Clean gloves are donned and current dressing removed. Previous treatment includes: Polymem    First date of treatment: 10/18/19    Objective findings: improved    Location: left  groin      Open Post-operative wound: Yes    Wound measured.: 0.5 cm x 0.2 cm x 0.1 cm , Stage: Stage 3: Full thickness skin loss with subcutaneous involvement     Wound description: no sign of infection    Tenderness:mildly    Odor: none    Drainage is moderate, serosanguinous     Tunneling:  N/A      Undermining: N/A    Wound Base: granulation    Subjective finding:     Pt states: doing well    Patient is assessed for discomfort which is: minimal  Compared to 4 weeks ago              Today's status of the wound: healing    Treatment: Removal of existing dressing, Visual inspection, Cleansing with Microklenz spray,, Wound packing with HydroferaBlue dressing   Application of clean dressing.  Pt received the following instructions: Cleansing with soap and water or Microklenz spray,   Apply HydroferaBlue dressing  and bandage Secure with: tape. Frequency:  daily   Signs and symptoms of infection taught.  Patient needs to be seen 2 weeks. Treated and follow-up appointment made. Annette Smart was available for supervision of care if needed or if questions should arise and regarding plan of care.  Margot Dye RN, CWON

## 2020-02-14 DIAGNOSIS — M05.742 RHEUMATOID ARTHRITIS INVOLVING BOTH HANDS WITH POSITIVE RHEUMATOID FACTOR (H): ICD-10-CM

## 2020-02-14 DIAGNOSIS — M05.741 RHEUMATOID ARTHRITIS INVOLVING BOTH HANDS WITH POSITIVE RHEUMATOID FACTOR (H): ICD-10-CM

## 2020-02-14 RX ORDER — METHOTREXATE 25 MG/ML
25 INJECTION, SOLUTION INTRA-ARTERIAL; INTRAMUSCULAR; INTRAVENOUS
Qty: 12 ML | Refills: 1 | Status: SHIPPED | OUTPATIENT
Start: 2020-02-14 | End: 2020-02-18

## 2020-02-14 NOTE — TELEPHONE ENCOUNTER
methotrexate 50 MG/2ML injection      Last Written Prescription Date:  11-21-19  Last Fill Quantity: 4 ml,   # refills: 3  Last Office Visit: 10-23-19  Future Office visit:  2-    CBC RESULTS:   Recent Labs   Lab Test 01/14/20  1039   WBC 7.4   RBC 4.37   HGB 13.0   HCT 40.9   MCV 94   MCH 29.7   MCHC 31.8   RDW 14.5          Creatinine   Date Value Ref Range Status   01/14/2020 0.75 0.52 - 1.04 mg/dL Final   ]    Liver Function Studies -   Recent Labs   Lab Test 01/14/20  1039  10/10/19  0831   PROTTOTAL  --   --  7.3   ALBUMIN  --   --  2.9*   BILITOTAL  --   --  0.4   ALKPHOS  --   --  73   AST 13   < > 16   ALT 17   < > 21    < > = values in this interval not displayed.       Kathleen M Doege RN

## 2020-02-18 ENCOUNTER — OFFICE VISIT (OUTPATIENT)
Dept: RHEUMATOLOGY | Facility: CLINIC | Age: 60
End: 2020-02-18
Attending: INTERNAL MEDICINE
Payer: COMMERCIAL

## 2020-02-18 VITALS
OXYGEN SATURATION: 97 % | WEIGHT: 293 LBS | HEART RATE: 97 BPM | TEMPERATURE: 97.5 F | SYSTOLIC BLOOD PRESSURE: 114 MMHG | DIASTOLIC BLOOD PRESSURE: 79 MMHG | BODY MASS INDEX: 59.01 KG/M2

## 2020-02-18 DIAGNOSIS — T45.1X5A ADVERSE EFFECT OF METHOTREXATE, INITIAL ENCOUNTER: ICD-10-CM

## 2020-02-18 DIAGNOSIS — M05.79 RHEUMATOID ARTHRITIS INVOLVING MULTIPLE SITES WITH POSITIVE RHEUMATOID FACTOR (H): ICD-10-CM

## 2020-02-18 DIAGNOSIS — M05.741 RHEUMATOID ARTHRITIS INVOLVING BOTH HANDS WITH POSITIVE RHEUMATOID FACTOR (H): Primary | ICD-10-CM

## 2020-02-18 DIAGNOSIS — M05.742 RHEUMATOID ARTHRITIS INVOLVING BOTH HANDS WITH POSITIVE RHEUMATOID FACTOR (H): Primary | ICD-10-CM

## 2020-02-18 DIAGNOSIS — Z79.899 HIGH RISK MEDICATION USE: ICD-10-CM

## 2020-02-18 PROCEDURE — G0463 HOSPITAL OUTPT CLINIC VISIT: HCPCS | Mod: ZF

## 2020-02-18 RX ORDER — METHOTREXATE 25 MG/ML
25 INJECTION, SOLUTION INTRA-ARTERIAL; INTRAMUSCULAR; INTRAVENOUS
Qty: 12 ML | Refills: 4 | Status: SHIPPED | OUTPATIENT
Start: 2020-02-18 | End: 2020-09-15

## 2020-02-18 RX ORDER — LEUCOVORIN CALCIUM 5 MG/1
5 TABLET ORAL
Qty: 4 TABLET | Refills: 3 | Status: SHIPPED | OUTPATIENT
Start: 2020-02-18 | End: 2020-09-15

## 2020-02-18 RX ORDER — FOLIC ACID 1 MG/1
3-4 TABLET ORAL DAILY
Qty: 120 TABLET | Refills: 7 | Status: SHIPPED | OUTPATIENT
Start: 2020-02-18 | End: 2020-09-15

## 2020-02-18 ASSESSMENT — PAIN SCALES - GENERAL: PAINLEVEL: MILD PAIN (2)

## 2020-02-18 NOTE — NURSING NOTE
Chief Complaint   Patient presents with     RECHECK     4 mos follow up     /79 (BP Location: Other (Comment), Patient Position: Sitting, Cuff Size: Adult Regular)   Pulse 97   Temp 97.5  F (36.4  C)   Wt (!) 165.8 kg (365 lb 9.6 oz)   SpO2 97%   BMI 59.01 kg/m        Trey Vidales, EMT

## 2020-02-18 NOTE — PROGRESS NOTES
[facility]  Rheumatology Clinic  Everett Austin MD  2020     Name: Shira Rodriguez  MRN: [id number]  Age: 59 year old  : 1960  Referring provider: Anjel Busby     Assessment and Plan:  # Inflammatory Arthritis (+RF/+CCP, dx 2017 with diffuse polyarticular inflammatory flare):   Ms. Rodriguez has well controlled inflammatory arthritis at this ti, with only limited involvement of the bilateral 2nd digits and minimal morning stiffness. Additionally, she has been without oral ulcers since adding leucovorin. I recommend that she continue her current immunomodulating regimen (methotrexate 25 mg subcutaneous, leucovorin 5 mg daily, and folate 4 mg daily). It is very unlikely that chronic persistent cough is secondary to a hypersensitivity or fibrotic response to methotrexate. I agree with repeating chest CT/PFTs in April, as ordered by pulmonology for further evaluation of her lungs. Follow up with lab monitoring in 3 months, and in clinic in 6 months.    Orders:    Creatinine  - ALT  - AST  - CBC with platelets  - methotrexate 50 MG/2ML IJ injection  Dispense: 12 mL; Refill: 4  - folic acid 1 MG PO tablet  Dispense: 120 tablet; Refill: 7  - leucovorin 5 MG PO tablet  Dispense: 4 tablet; Refill: 3    I, Deja Jauregui am acting as scribe for Dr. Everett Austin MD.    I was present with the medical student who participated in the service and in the documentation of the note. I have verified the history and personally performed the physical exam and medical decision making. I agree with the assessment and plan of care as documented in the note.    Everett Austin M.D.  Staff Rheumatologist, [facility]  Pager [phone]     Follow-up: Return in about 6 months (around 2020).     HPI:   Shira Rodriguez is a 59 year old female with a history of rheumatoid arthritis who presents for follow up. I last saw the patient on 10/23/2019 during which she endorsed ongoing pain in her second PIP and the right  great toe with minimal tenderness and no synovitis on exam. Seropositive rheumatoid arthritis was well controlled at that time on 25 mg of subcutaneous Methotrexate. She did report oral lesions which may be related to Methotrexate. She was also encouraged to trial folate 4 mg daily as well as leucovorin 5 mg to relieve any mucosal irritation related to Methotrexate.     Today, the patient reports that she has been doing well since her last visit. She states that her oral lesions resolved with  leucovorin and folic acid. She also notes that her joint pain and decreased range of motion is limited to her bilateral second digits in her hands. Her left second finger is more stiff than her right, and she denies locking or ratcheting in either finger. She notes that her morning stiffness typically resolves in 15 to 30 minutes. She has no significant swelling in her feet. She notes that she has very little swelling in her legs after starting lasix. She denies rash, shortness of breath, or raynaud's flares.     The patient states that she had a cough prior to her pulmonary embolism/myocardial infarction. She then developed pneumonia while intubated and required a thoracentesis while in the hospital. She reports that she had a persistent cough after this procedure and repeat ultrasound noted further fluid for which she had another thoracentesis which produced 600 ml of fluid. She notes that her cough is improved after her second thoracentesis but is still present. She states her pulmonologist is concerned her remaining cough could be related to her Methotrexate. She is set up for a repeat PFTs and Chest Xray in the coming months.      Rheumatological history:  Referrred 2/2017 for 3 months of BL hand pain thought consistent with CTS and positive RF/CCP. EMG showed moderate median neuropathy on L and severe on R, MRI c-spine normal. She received R CT injection by sports [employer] with 9 months of relief. She did describe  intermittent episodes of swelling of her feet/ankles around this time period as well that resolved with medrol dose pack. She was re-evaluated 8/2017 after developing polyarticular inflammatory arthritis of the right knee, ankles, wrists, hands, and shoulders. She was started on MTX, given depomedrol IM injection, oral prednisone, and given R knee CS injection. MTX increased and oral prednisone tapered to off in 9/2017. Had repeat R CT injection in 10/2017 that lasted until 1/2018. R knee significantly improved following CS injection 12/2017. Has persistent pain in L heel with enthesopathy on imaging, unclear if had true enthesitis in this region due to her RA. Participated in pool therapy winter 2018 with good response. At her 3/2018 OV she continued to have significant pain in her R wrist thought due to CT, but given some ongoing persistent EMS (BL hand stiffness lasting ALL day) as well and her known seropositive disease I recommended adding Humira to her regimen. This was ordered but she never started. Methotrexate Rx continued through 2019.     Review of Systems:   Pertinent items are noted in HPI or as below, remainder of complete ROS is negative.      No recent problems with hearing or vision. No swallowing problems.   No breathing difficulty, shortness of breath, coughing, or wheezing  No chest pain or palpitations  No heart burn, indigestion, abdominal pain, nausea, vomiting, diarrhea  No urination problems, no bloody, cloudy urine, no dysuria  No numbing, tingling, weakness  No headaches or confusion  No rashes. No easy bleeding or bruising.       Active Medications:     Current Outpatient Medications:      acetaminophen (TYLENOL) 325 MG tablet, Take 2 tablets (650 mg) by mouth every 6 hours as needed for mild pain or fever, Disp: , Rfl:      ARIPiprazole (ABILIFY) 5 MG tablet, Take 1 tablet (5 mg) by mouth daily, Disp: 90 tablet, Rfl: 3     DULoxetine (CYMBALTA) 60 MG capsule, Take 1 capsule (60 mg) by  "mouth daily, Disp: 30 capsule, Rfl: 0     folic acid 1 MG PO tablet, Take 3-4 tablets (3-4 mg) by mouth daily, Disp: 120 tablet, Rfl: 7     furosemide (LASIX) 40 MG tablet, Take 2 tablets (80 mg) by mouth daily, Disp: 180 tablet, Rfl: 1     insulin syringe-needle U-100 (BD INSULIN SYRINGE ULTRAFINE) 30G X 1/2\" 1 ML miscellaneous, For methotrexate use weekly, Disp: 8 each, Rfl: prn     leucovorin 5 MG PO tablet, Take 1 tablet (5 mg) by mouth every 7 days *take 24h after taking weekly methotrexate dose, Disp: 4 tablet, Rfl: 3     lisinopril (PRINIVIL/ZESTRIL) 5 MG tablet, Take 1 tablet (5 mg) by mouth daily, Disp: 90 tablet, Rfl: 3     methotrexate 50 MG/2ML IJ injection, Inject 1 mL (25 mg) Subcutaneous every 7 days, Disp: 12 mL, Rfl: 4     nystatin (MYCOSTATIN) 866589 UNIT/GM external powder, Apply to coat rash tid, neck and breast area rashes, Disp: 60 g, Rfl: 3     potassium chloride ER (MICRO-K) 10 MEQ CR capsule, Take 1 capsule (10 mEq) by mouth 2 times daily, Disp: 180 capsule, Rfl: 3     warfarin ANTICOAGULANT (COUMADIN) 5 MG tablet, Take 1.5-2 tablets daily or as directed by coumadin clinic., Disp: 60 tablet, Rfl: 1     atorvastatin (LIPITOR) 40 MG tablet, Take 1 tablet (40 mg) by mouth every evening (Patient not taking: Reported on 2/18/2020), Disp: 90 tablet, Rfl: 3     enoxaparin ANTICOAGULANT (LOVENOX) 150 MG/ML syringe, Inject 1.1 mLs (165 mg) Subcutaneous every 12 hours, Disp: 6 Syringe, Rfl: 1     order for DME, Please discontinue all oxygen and collect equipment. (Patient not taking: Reported on 2/18/2020), Disp: 1 Units, Rfl: 0    Current Facility-Administered Medications:      lidocaine (PF) (XYLOCAINE) 1 % injection 4 mL, 4 mL, , , Ken Modi MD, 4 mL at 12/31/19 1030     triamcinolone (KENALOG-40) injection 40 mg, 40 mg, , , Ken Modi MD, 40 mg at 12/31/19 1030    Facility-Administered Medications Ordered in Other Visits:      sodium chloride (PF) 0.9% PF flush 10 mL, 10 mL, Intracatheter, " Adi Borja Christopher Coleman, APRN CNP      Allergies:   Adhesive tape and Erythromycin      Past Medical History:  Depression   Eczema   Hyperlipidemia   Hypertension   Morbid obesity   Osteoarthrosis right knee   Sleep apnea   Tobacco use disorder   Primary localized osteoarthrosis, pelvic region and thigh  Degeneration of cervical intervertebral disc  Chronic bronchitis   Pulmonary embolism   Cardiac arrest      Past Surgical History:  Left total knee arthoplasty 6/14/2012   Right hip arthroplasty 07/09/2004      Family History:   Mother: thyroid disease, hypertension, skin cancer, CVA  Paternal grandmother: breast cancer, pancreatic cancer   Father: alcoholism   Sister: thyroid disease, alcoholism   Maternal grandmother: hypertension   Sister: heart disease, multiple ablations   Paternal grandfather: prostate cancer      Social History:   Current everyday cigarette smoker, 1 pack/day, 33 years, started 1982  No smokeless tobacco use  Occasional alcohol use   Physical Exam:   /79 (BP Location: Other (Comment), Patient Position: Sitting, Cuff Size: Adult Regular)   Pulse 97   Temp 97.5  F (36.4  C)   Wt (!) 165.8 kg (365 lb 9.6 oz)   SpO2 97%   BMI 59.01 kg/m     Wt Readings from Last 4 Encounters:   02/18/20 (!) 165.8 kg (365 lb 9.6 oz)   01/14/20 (!) 167.8 kg (370 lb)   01/06/20 (!) 167.1 kg (368 lb 6.4 oz)   12/31/19 (!) 171 kg (377 lb)     Constitutional: Well-developed, appearing stated age; cooperative  Eyes: Normal EOM, PERRLA, vision, conjunctiva, sclera  ENT: Normal external ears, nose, hearing, lips, teeth, gums, throat. No mucous membrane lesions, normal saliva pool  Neck: No mass or thyroid enlargement  Resp: Lungs clear to auscultation with good airflow in all upper and lower fields, nl to palpation  CV: RRR, no murmurs, rubs or gallops, Very little retained fluid on the bilateral legs.  GI: No ABD mass or tenderness, no HSM  : Not tested  Lymph: No cervical, supraclavicular, inguinal  or epitrochlear nodes  MS: Excellent alignment of MCPs and PIPs. No appearance of dactylitis at either second finger. The TMJ, neck, shoulder, elbow, wrist, DIP, spine, hip, knee, ankle, and foot MTP/IP joints were examined and found normal. No active synovitis or altered joint anatomy. Full joint ROM. Normal  strength. No dactylitis,  tenosynovitis, enthesopathy.  Skin: No nail pitting, alopecia, nodules or lesions.    Neuro: Normal cranial nerves, strength, sensation, DTRs.   Psych: Normal judgement, orientation, memory, affect.     Laboratory:   RHEUM RESULTS Latest Ref Rng & Units 12/12/2019 1/14/2020 1/14/2020   SED RATE 0 - 30 mm/h - - -   CRP, INFLAMMATION 0.0 - 8.0 mg/L - - -   RHEUMATOID FACTOR <20 IU/mL - - -   EMERY SCREEN BY EIA <1.0 - - -   AST 0 - 45 U/L - - 13   ALT 0 - 50 U/L - - 17   ALBUMIN 3.4 - 5.0 g/dL - - -   WBC 4.0 - 11.0 10e9/L 9.1 - 7.4   RBC 3.8 - 5.2 10e12/L 4.45 - 4.37   HGB 11.7 - 15.7 g/dL 13.4 - 13.0   HCT 35.0 - 47.0 % 41.5 - 40.9   MCV 78 - 100 fl 93 - 94   MCHC 31.5 - 36.5 g/dL 32.3 - 31.8   RDW 10.0 - 15.0 % 15.7(H) - 14.5    - 450 10e9/L 290 - 339   CREATININE 0.52 - 1.04 mg/dL 0.69 - 0.75   GFR ESTIMATE, IF BLACK >60 mL/min/[1.73:m2] >90 >90 >90   GFR ESTIMATE >60 mL/min/[1.73:m2] >90 86 88    - 1,620 mg/dL - - -   IGA 70 - 380 mg/dL - - -   IGM 60 - 265 mg/dL - - -   HEPATITIS C ANTIBODY NR - - -       Rheumatoid Factor   Date Value Ref Range Status   08/28/2017 51 (H) <20 IU/mL Final     Cyclic Citrullinated Peptide Antibody, IgG   Date Value Ref Range Status   08/28/2017 15 (H) <7 U/mL Final     Comment:     Positive     EMERY Screen by EIA   Date Value Ref Range Status   12/30/2016 <1.0  Interpretation:  Negative   <1.0 Final     Hepatitis B Core Caron   Date Value Ref Range Status   04/12/2017 Nonreactive NR Final     Hep B Surface Agn   Date Value Ref Range Status   04/12/2017 Nonreactive NR Final     IGG   Date Value Ref Range Status   04/21/2017 940 695 -  1,620 mg/dL Final     IGA   Date Value Ref Range Status   04/21/2017 271 70 - 380 mg/dL Final     IGM   Date Value Ref Range Status   04/21/2017 395 (H) 60 - 265 mg/dL Final         Scribe Disclosure:  IGeorgette, am serving as a scribe to document services personally performed by Everett Austin MD at this visit, based upon the provider's statements to me. All documentation has been reviewed by the aforementioned provider prior to being entered into the official medical record.

## 2020-02-18 NOTE — LETTER
2020       RE: Shira Rodriguez  [id number]  [place] [address]     Dear Colleague,    Thank you for referring your patient, Shira Rodriguez, to the [facility] RHEUMATOLOGY at [facility]. Please see a copy of my visit note below.    [facility]  Rheumatology Clinic  Everett Austin MD  2020     Name: Shira Rodriguez  MRN: [id number]  Age: 59 year old  : 1960  Referring provider: Anjel Busby     Assessment and Plan:  # Inflammatory Arthritis (+RF/+CCP, dx 2017 with diffuse polyarticular inflammatory flare):   Ms. Rodriguez has well controlled inflammatory arthritis at this ti, with only limited involvement of the bilateral 2nd digits and minimal morning stiffness. Additionally, she has been without oral ulcers since adding leucovorin. I recommend that she continue her current immunomodulating regimen (methotrexate 25 mg subcutaneous, leucovorin 5 mg daily, and folate 4 mg daily). It is very unlikely that chronic persistent cough is secondary to a hypersensitivity or fibrotic response to methotrexate. I agree with repeating chest CT/PFTs in April, as ordered by pulmonology for further evaluation of her lungs. Follow up with lab monitoring in 3 months, and in clinic in 6 months.    Orders:    Creatinine  - ALT  - AST  - CBC with platelets  - methotrexate 50 MG/2ML IJ injection  Dispense: 12 mL; Refill: 4  - folic acid 1 MG PO tablet  Dispense: 120 tablet; Refill: 7  - leucovorin 5 MG PO tablet  Dispense: 4 tablet; Refill: 3    I, Deja Jauregui am acting as scribe for Dr. Everett Austin MD.    I was present with the medical student who participated in the service and in the documentation of the note. I have verified the history and personally performed the physical exam and medical decision making. I agree with the assessment and plan of care as documented in the note.    Everett Austin M.D.  Staff Rheumatologist, [facility]  Pager  [phone]     Follow-up: Return in about 6 months (around 8/18/2020).     HPI:   Shira Rodriguez is a 59 year old female with a history of rheumatoid arthritis who presents for follow up. I last saw the patient on 10/23/2019 during which she endorsed ongoing pain in her second PIP and the right great toe with minimal tenderness and no synovitis on exam. Seropositive rheumatoid arthritis was well controlled at that time on 25 mg of subcutaneous Methotrexate. She did report oral lesions which may be related to Methotrexate. She was also encouraged to trial folate 4 mg daily as well as leucovorin 5 mg to relieve any mucosal irritation related to Methotrexate.     Today, the patient reports that she has been doing well since her last visit. She states that her oral lesions resolved with  leucovorin and folic acid. She also notes that her joint pain and decreased range of motion is limited to her bilateral second digits in her hands. Her left second finger is more stiff than her right, and she denies locking or ratcheting in either finger. She notes that her morning stiffness typically resolves in 15 to 30 minutes. She has no significant swelling in her feet. She notes that she has very little swelling in her legs after starting lasix. She denies rash, shortness of breath, or raynaud's flares.     The patient states that she had a cough prior to her pulmonary embolism/myocardial infarction. She then developed pneumonia while intubated and required a thoracentesis while in the hospital. She reports that she had a persistent cough after this procedure and repeat ultrasound noted further fluid for which she had another thoracentesis which produced 600 ml of fluid. She notes that her cough is improved after her second thoracentesis but is still present. She states her pulmonologist is concerned her remaining cough could be related to her Methotrexate. She is set up for a repeat PFTs and Chest Xray in the coming months.       Rheumatological history:  Referrred 2/2017 for 3 months of BL hand pain thought consistent with CTS and positive RF/CCP. EMG showed moderate median neuropathy on L and severe on R, MRI c-spine normal. She received R CT injection by [facility] with 9 months of relief. She did describe intermittent episodes of swelling of her feet/ankles around this time period as well that resolved with medrol dose pack. She was re-evaluated 8/2017 after developing polyarticular inflammatory arthritis of the right knee, ankles, wrists, hands, and shoulders. She was started on MTX, given depomedrol IM injection, oral prednisone, and given R knee CS injection. MTX increased and oral prednisone tapered to off in 9/2017. Had repeat R CT injection in 10/2017 that lasted until 1/2018. R knee significantly improved following CS injection 12/2017. Has persistent pain in L heel with enthesopathy on imaging, unclear if had true enthesitis in this region due to her RA. Participated in pool therapy winter 2018 with good response. At her 3/2018 OV she continued to have significant pain in her R wrist thought due to CT, but given some ongoing persistent EMS (BL hand stiffness lasting ALL day) as well and her known seropositive disease I recommended adding Humira to her regimen. This was ordered but she never started. Methotrexate Rx continued through 2019.     Review of Systems:   Pertinent items are noted in HPI or as below, remainder of complete ROS is negative.      No recent problems with hearing or vision. No swallowing problems.   No breathing difficulty, shortness of breath, coughing, or wheezing  No chest pain or palpitations  No heart burn, indigestion, abdominal pain, nausea, vomiting, diarrhea  No urination problems, no bloody, cloudy urine, no dysuria  No numbing, tingling, weakness  No headaches or confusion  No rashes. No easy bleeding or bruising.       Active Medications:     Current Outpatient Medications:      acetaminophen  "(TYLENOL) 325 MG tablet, Take 2 tablets (650 mg) by mouth every 6 hours as needed for mild pain or fever, Disp: , Rfl:      ARIPiprazole (ABILIFY) 5 MG tablet, Take 1 tablet (5 mg) by mouth daily, Disp: 90 tablet, Rfl: 3     DULoxetine (CYMBALTA) 60 MG capsule, Take 1 capsule (60 mg) by mouth daily, Disp: 30 capsule, Rfl: 0     folic acid 1 MG PO tablet, Take 3-4 tablets (3-4 mg) by mouth daily, Disp: 120 tablet, Rfl: 7     furosemide (LASIX) 40 MG tablet, Take 2 tablets (80 mg) by mouth daily, Disp: 180 tablet, Rfl: 1     insulin syringe-needle U-100 (BD INSULIN SYRINGE ULTRAFINE) 30G X 1/2\" 1 ML miscellaneous, For methotrexate use weekly, Disp: 8 each, Rfl: prn     leucovorin 5 MG PO tablet, Take 1 tablet (5 mg) by mouth every 7 days *take 24h after taking weekly methotrexate dose, Disp: 4 tablet, Rfl: 3     lisinopril (PRINIVIL/ZESTRIL) 5 MG tablet, Take 1 tablet (5 mg) by mouth daily, Disp: 90 tablet, Rfl: 3     methotrexate 50 MG/2ML IJ injection, Inject 1 mL (25 mg) Subcutaneous every 7 days, Disp: 12 mL, Rfl: 4     nystatin (MYCOSTATIN) 727198 UNIT/GM external powder, Apply to coat rash tid, neck and breast area rashes, Disp: 60 g, Rfl: 3     potassium chloride ER (MICRO-K) 10 MEQ CR capsule, Take 1 capsule (10 mEq) by mouth 2 times daily, Disp: 180 capsule, Rfl: 3     warfarin ANTICOAGULANT (COUMADIN) 5 MG tablet, Take 1.5-2 tablets daily or as directed by coumadin clinic., Disp: 60 tablet, Rfl: 1     atorvastatin (LIPITOR) 40 MG tablet, Take 1 tablet (40 mg) by mouth every evening (Patient not taking: Reported on 2/18/2020), Disp: 90 tablet, Rfl: 3     enoxaparin ANTICOAGULANT (LOVENOX) 150 MG/ML syringe, Inject 1.1 mLs (165 mg) Subcutaneous every 12 hours, Disp: 6 Syringe, Rfl: 1     order for DME, Please discontinue all oxygen and collect equipment. (Patient not taking: Reported on 2/18/2020), Disp: 1 Units, Rfl: 0    Current Facility-Administered Medications:      lidocaine (PF) (XYLOCAINE) 1 % injection " 4 mL, 4 mL, , , Ken Modi MD, 4 mL at 12/31/19 1030     triamcinolone (KENALOG-40) injection 40 mg, 40 mg, , , Ken Modi MD, 40 mg at 12/31/19 1030    Facility-Administered Medications Ordered in Other Visits:      sodium chloride (PF) 0.9% PF flush 10 mL, 10 mL, Intracatheter, Once, Loy Joshi APRN CNP      Allergies:   Adhesive tape and Erythromycin      Past Medical History:  Depression   Eczema   Hyperlipidemia   Hypertension   Morbid obesity   Osteoarthrosis right knee   Sleep apnea   Tobacco use disorder   Primary localized osteoarthrosis, pelvic region and thigh  Degeneration of cervical intervertebral disc  Chronic bronchitis   Pulmonary embolism   Cardiac arrest      Past Surgical History:  Left total knee arthoplasty 6/14/2012   Right hip arthroplasty 07/09/2004      Family History:   Mother: thyroid disease, hypertension, skin cancer, CVA  Paternal grandmother: breast cancer, pancreatic cancer   Father: alcoholism   Sister: thyroid disease, alcoholism   Maternal grandmother: hypertension   Sister: heart disease, multiple ablations   Paternal grandfather: prostate cancer      Social History:   Current everyday cigarette smoker, 1 pack/day, 33 years, started 1982  No smokeless tobacco use  Occasional alcohol use   Physical Exam:   /79 (BP Location: Other (Comment), Patient Position: Sitting, Cuff Size: Adult Regular)   Pulse 97   Temp 97.5  F (36.4  C)   Wt (!) 165.8 kg (365 lb 9.6 oz)   SpO2 97%   BMI 59.01 kg/m      Wt Readings from Last 4 Encounters:   02/18/20 (!) 165.8 kg (365 lb 9.6 oz)   01/14/20 (!) 167.8 kg (370 lb)   01/06/20 (!) 167.1 kg (368 lb 6.4 oz)   12/31/19 (!) 171 kg (377 lb)     Constitutional: Well-developed, appearing stated age; cooperative  Eyes: Normal EOM, PERRLA, vision, conjunctiva, sclera  ENT: Normal external ears, nose, hearing, lips, teeth, gums, throat. No mucous membrane lesions, normal saliva pool  Neck: No mass or thyroid  enlargement  Resp: Lungs clear to auscultation with good airflow in all upper and lower fields, nl to palpation  CV: RRR, no murmurs, rubs or gallops, Very little retained fluid on the bilateral legs.  GI: No ABD mass or tenderness, no HSM  : Not tested  Lymph: No cervical, supraclavicular, inguinal or epitrochlear nodes  MS: Excellent alignment of MCPs and PIPs. No appearance of dactylitis at either second finger. The TMJ, neck, shoulder, elbow, wrist, DIP, spine, hip, knee, ankle, and foot MTP/IP joints were examined and found normal. No active synovitis or altered joint anatomy. Full joint ROM. Normal  strength. No dactylitis,  tenosynovitis, enthesopathy.  Skin: No nail pitting, alopecia, nodules or lesions.    Neuro: Normal cranial nerves, strength, sensation, DTRs.   Psych: Normal judgement, orientation, memory, affect.     Laboratory:   RHEUM RESULTS Latest Ref Rng & Units 12/12/2019 1/14/2020 1/14/2020   SED RATE 0 - 30 mm/h - - -   CRP, INFLAMMATION 0.0 - 8.0 mg/L - - -   RHEUMATOID FACTOR <20 IU/mL - - -   EMERY SCREEN BY EIA <1.0 - - -   AST 0 - 45 U/L - - 13   ALT 0 - 50 U/L - - 17   ALBUMIN 3.4 - 5.0 g/dL - - -   WBC 4.0 - 11.0 10e9/L 9.1 - 7.4   RBC 3.8 - 5.2 10e12/L 4.45 - 4.37   HGB 11.7 - 15.7 g/dL 13.4 - 13.0   HCT 35.0 - 47.0 % 41.5 - 40.9   MCV 78 - 100 fl 93 - 94   MCHC 31.5 - 36.5 g/dL 32.3 - 31.8   RDW 10.0 - 15.0 % 15.7(H) - 14.5    - 450 10e9/L 290 - 339   CREATININE 0.52 - 1.04 mg/dL 0.69 - 0.75   GFR ESTIMATE, IF BLACK >60 mL/min/[1.73:m2] >90 >90 >90   GFR ESTIMATE >60 mL/min/[1.73:m2] >90 86 88    - 1,620 mg/dL - - -   IGA 70 - 380 mg/dL - - -   IGM 60 - 265 mg/dL - - -   HEPATITIS C ANTIBODY NR - - -       Rheumatoid Factor   Date Value Ref Range Status   08/28/2017 51 (H) <20 IU/mL Final     Cyclic Citrullinated Peptide Antibody, IgG   Date Value Ref Range Status   08/28/2017 15 (H) <7 U/mL Final     Comment:     Positive     EMERY Screen by EIA   Date Value Ref Range  Status   12/30/2016 <1.0  Interpretation:  Negative   <1.0 Final     Hepatitis B Core Caron   Date Value Ref Range Status   04/12/2017 Nonreactive NR Final     Hep B Surface Agn   Date Value Ref Range Status   04/12/2017 Nonreactive NR Final     IGG   Date Value Ref Range Status   04/21/2017 940 695 - 1,620 mg/dL Final     IGA   Date Value Ref Range Status   04/21/2017 271 70 - 380 mg/dL Final     IGM   Date Value Ref Range Status   04/21/2017 395 (H) 60 - 265 mg/dL Final         Scribe Disclosure:  Georgette MAHARAJ, am serving as a scribe to document services personally performed by Everett Austin MD at this visit, based upon the provider's statements to me. All documentation has been reviewed by the aforementioned provider prior to being entered into the official medical record.    Again, thank you for allowing me to participate in the care of your patient.      Sincerely,    Everett Austin MD

## 2020-02-18 NOTE — LETTER
2020      RE: Shira Rodriguez  [address]  [facility] [address]       [facility]  Rheumatology Clinic  Everett Austin MD  2020     Name: Shira Rodriguez  MRN: [id number]  Age: 59 year old  : 1960  Referring provider: Anjel Busby     Assessment and Plan:  # Inflammatory Arthritis (+RF/+CCP, dx 2017 with diffuse polyarticular inflammatory flare):   Ms. Rodriguez has well controlled inflammatory arthritis at this ti, with only limited involvement of the bilateral 2nd digits and minimal morning stiffness. Additionally, she has been without oral ulcers since adding leucovorin. I recommend that she continue her current immunomodulating regimen (methotrexate 25 mg subcutaneous, leucovorin 5 mg daily, and folate 4 mg daily). It is very unlikely that chronic persistent cough is secondary to a hypersensitivity or fibrotic response to methotrexate. I agree with repeating chest CT/PFTs in April, as ordered by pulmonology for further evaluation of her lungs. Follow up with lab monitoring in 3 months, and in clinic in 6 months.    Orders:    Creatinine  - ALT  - AST  - CBC with platelets  - methotrexate 50 MG/2ML IJ injection  Dispense: 12 mL; Refill: 4  - folic acid 1 MG PO tablet  Dispense: 120 tablet; Refill: 7  - leucovorin 5 MG PO tablet  Dispense: 4 tablet; Refill: 3    I, Deja Jauregui am acting as scribe for Dr. Everett Austin MD.    I was present with the medical student who participated in the service and in the documentation of the note. I have verified the history and personally performed the physical exam and medical decision making. I agree with the assessment and plan of care as documented in the note.    Everett Austin M.D.  Staff Rheumatologist, [facility]  Pager [phone]     Follow-up: Return in about 6 months (around 2020).     HPI:   Shira Rodriguez is a 59 year old female with a history of rheumatoid arthritis who presents for follow up. I last  saw the patient on 10/23/2019 during which she endorsed ongoing pain in her second PIP and the right great toe with minimal tenderness and no synovitis on exam. Seropositive rheumatoid arthritis was well controlled at that time on 25 mg of subcutaneous Methotrexate. She did report oral lesions which may be related to Methotrexate. She was also encouraged to trial folate 4 mg daily as well as leucovorin 5 mg to relieve any mucosal irritation related to Methotrexate.     Today, the patient reports that she has been doing well since her last visit. She states that her oral lesions resolved with  leucovorin and folic acid. She also notes that her joint pain and decreased range of motion is limited to her bilateral second digits in her hands. Her left second finger is more stiff than her right, and she denies locking or ratcheting in either finger. She notes that her morning stiffness typically resolves in 15 to 30 minutes. She has no significant swelling in her feet. She notes that she has very little swelling in her legs after starting lasix. She denies rash, shortness of breath, or raynaud's flares.     The patient states that she had a cough prior to her pulmonary embolism/myocardial infarction. She then developed pneumonia while intubated and required a thoracentesis while in the hospital. She reports that she had a persistent cough after this procedure and repeat ultrasound noted further fluid for which she had another thoracentesis which produced 600 ml of fluid. She notes that her cough is improved after her second thoracentesis but is still present. She states her pulmonologist is concerned her remaining cough could be related to her Methotrexate. She is set up for a repeat PFTs and Chest Xray in the coming months.      Rheumatological history:  Referrred 2/2017 for 3 months of BL hand pain thought consistent with CTS and positive RF/CCP. EMG showed moderate median neuropathy on L and severe on R, MRI c-spine  normal. She received R CT injection by [employer] with 9 months of relief. She did describe intermittent episodes of swelling of her feet/ankles around this time period as well that resolved with medrol dose pack. She was re-evaluated 8/2017 after developing polyarticular inflammatory arthritis of the right knee, ankles, wrists, hands, and shoulders. She was started on MTX, given depomedrol IM injection, oral prednisone, and given R knee CS injection. MTX increased and oral prednisone tapered to off in 9/2017. Had repeat R CT injection in 10/2017 that lasted until 1/2018. R knee significantly improved following CS injection 12/2017. Has persistent pain in L heel with enthesopathy on imaging, unclear if had true enthesitis in this region due to her RA. Participated in pool therapy winter 2018 with good response. At her 3/2018 OV she continued to have significant pain in her R wrist thought due to CT, but given some ongoing persistent EMS (BL hand stiffness lasting ALL day) as well and her known seropositive disease I recommended adding Humira to her regimen. This was ordered but she never started. Methotrexate Rx continued through 2019.     Review of Systems:   Pertinent items are noted in HPI or as below, remainder of complete ROS is negative.      No recent problems with hearing or vision. No swallowing problems.   No breathing difficulty, shortness of breath, coughing, or wheezing  No chest pain or palpitations  No heart burn, indigestion, abdominal pain, nausea, vomiting, diarrhea  No urination problems, no bloody, cloudy urine, no dysuria  No numbing, tingling, weakness  No headaches or confusion  No rashes. No easy bleeding or bruising.       Active Medications:     Current Outpatient Medications:      acetaminophen (TYLENOL) 325 MG tablet, Take 2 tablets (650 mg) by mouth every 6 hours as needed for mild pain or fever, Disp: , Rfl:      ARIPiprazole (ABILIFY) 5 MG tablet, Take 1 tablet (5 mg) by mouth daily,  "Disp: 90 tablet, Rfl: 3     DULoxetine (CYMBALTA) 60 MG capsule, Take 1 capsule (60 mg) by mouth daily, Disp: 30 capsule, Rfl: 0     folic acid 1 MG PO tablet, Take 3-4 tablets (3-4 mg) by mouth daily, Disp: 120 tablet, Rfl: 7     furosemide (LASIX) 40 MG tablet, Take 2 tablets (80 mg) by mouth daily, Disp: 180 tablet, Rfl: 1     insulin syringe-needle U-100 (BD INSULIN SYRINGE ULTRAFINE) 30G X 1/2\" 1 ML miscellaneous, For methotrexate use weekly, Disp: 8 each, Rfl: prn     leucovorin 5 MG PO tablet, Take 1 tablet (5 mg) by mouth every 7 days *take 24h after taking weekly methotrexate dose, Disp: 4 tablet, Rfl: 3     lisinopril (PRINIVIL/ZESTRIL) 5 MG tablet, Take 1 tablet (5 mg) by mouth daily, Disp: 90 tablet, Rfl: 3     methotrexate 50 MG/2ML IJ injection, Inject 1 mL (25 mg) Subcutaneous every 7 days, Disp: 12 mL, Rfl: 4     nystatin (MYCOSTATIN) 363904 UNIT/GM external powder, Apply to coat rash tid, neck and breast area rashes, Disp: 60 g, Rfl: 3     potassium chloride ER (MICRO-K) 10 MEQ CR capsule, Take 1 capsule (10 mEq) by mouth 2 times daily, Disp: 180 capsule, Rfl: 3     warfarin ANTICOAGULANT (COUMADIN) 5 MG tablet, Take 1.5-2 tablets daily or as directed by coumadin clinic., Disp: 60 tablet, Rfl: 1     atorvastatin (LIPITOR) 40 MG tablet, Take 1 tablet (40 mg) by mouth every evening (Patient not taking: Reported on 2/18/2020), Disp: 90 tablet, Rfl: 3     enoxaparin ANTICOAGULANT (LOVENOX) 150 MG/ML syringe, Inject 1.1 mLs (165 mg) Subcutaneous every 12 hours, Disp: 6 Syringe, Rfl: 1     order for DME, Please discontinue all oxygen and collect equipment. (Patient not taking: Reported on 2/18/2020), Disp: 1 Units, Rfl: 0    Current Facility-Administered Medications:      lidocaine (PF) (XYLOCAINE) 1 % injection 4 mL, 4 mL, , , Ken Modi MD, 4 mL at 12/31/19 1030     triamcinolone (KENALOG-40) injection 40 mg, 40 mg, , , Ken Modi MD, 40 mg at 12/31/19 1030    Facility-Administered Medications " Ordered in Other Visits:      sodium chloride (PF) 0.9% PF flush 10 mL, 10 mL, Intracatheter, Once, Loy Joshi APRN CNP      Allergies:   Adhesive tape and Erythromycin      Past Medical History:  Depression   Eczema   Hyperlipidemia   Hypertension   Morbid obesity   Osteoarthrosis right knee   Sleep apnea   Tobacco use disorder   Primary localized osteoarthrosis, pelvic region and thigh  Degeneration of cervical intervertebral disc  Chronic bronchitis   Pulmonary embolism   Cardiac arrest      Past Surgical History:  Left total knee arthoplasty 6/14/2012   Right hip arthroplasty 07/09/2004      Family History:   Mother: thyroid disease, hypertension, skin cancer, CVA  Paternal grandmother: breast cancer, pancreatic cancer   Father: alcoholism   Sister: thyroid disease, alcoholism   Maternal grandmother: hypertension   Sister: heart disease, multiple ablations   Paternal grandfather: prostate cancer      Social History:   Current everyday cigarette smoker, 1 pack/day, 33 years, started 1982  No smokeless tobacco use  Occasional alcohol use   Physical Exam:   /79 (BP Location: Other (Comment), Patient Position: Sitting, Cuff Size: Adult Regular)   Pulse 97   Temp 97.5  F (36.4  C)   Wt (!) 165.8 kg (365 lb 9.6 oz)   SpO2 97%   BMI 59.01 kg/m      Wt Readings from Last 4 Encounters:   02/18/20 (!) 165.8 kg (365 lb 9.6 oz)   01/14/20 (!) 167.8 kg (370 lb)   01/06/20 (!) 167.1 kg (368 lb 6.4 oz)   12/31/19 (!) 171 kg (377 lb)     Constitutional: Well-developed, appearing stated age; cooperative  Eyes: Normal EOM, PERRLA, vision, conjunctiva, sclera  ENT: Normal external ears, nose, hearing, lips, teeth, gums, throat. No mucous membrane lesions, normal saliva pool  Neck: No mass or thyroid enlargement  Resp: Lungs clear to auscultation with good airflow in all upper and lower fields, nl to palpation  CV: RRR, no murmurs, rubs or gallops, Very little retained fluid on the bilateral legs.  GI: No  ABD mass or tenderness, no HSM  : Not tested  Lymph: No cervical, supraclavicular, inguinal or epitrochlear nodes  MS: Excellent alignment of MCPs and PIPs. No appearance of dactylitis at either second finger. The TMJ, neck, shoulder, elbow, wrist, DIP, spine, hip, knee, ankle, and foot MTP/IP joints were examined and found normal. No active synovitis or altered joint anatomy. Full joint ROM. Normal  strength. No dactylitis,  tenosynovitis, enthesopathy.  Skin: No nail pitting, alopecia, nodules or lesions.    Neuro: Normal cranial nerves, strength, sensation, DTRs.   Psych: Normal judgement, orientation, memory, affect.     Laboratory:   RHEUM RESULTS Latest Ref Rng & Units 12/12/2019 1/14/2020 1/14/2020   SED RATE 0 - 30 mm/h - - -   CRP, INFLAMMATION 0.0 - 8.0 mg/L - - -   RHEUMATOID FACTOR <20 IU/mL - - -   EMERY SCREEN BY EIA <1.0 - - -   AST 0 - 45 U/L - - 13   ALT 0 - 50 U/L - - 17   ALBUMIN 3.4 - 5.0 g/dL - - -   WBC 4.0 - 11.0 10e9/L 9.1 - 7.4   RBC 3.8 - 5.2 10e12/L 4.45 - 4.37   HGB 11.7 - 15.7 g/dL 13.4 - 13.0   HCT 35.0 - 47.0 % 41.5 - 40.9   MCV 78 - 100 fl 93 - 94   MCHC 31.5 - 36.5 g/dL 32.3 - 31.8   RDW 10.0 - 15.0 % 15.7(H) - 14.5    - 450 10e9/L 290 - 339   CREATININE 0.52 - 1.04 mg/dL 0.69 - 0.75   GFR ESTIMATE, IF BLACK >60 mL/min/[1.73:m2] >90 >90 >90   GFR ESTIMATE >60 mL/min/[1.73:m2] >90 86 88    - 1,620 mg/dL - - -   IGA 70 - 380 mg/dL - - -   IGM 60 - 265 mg/dL - - -   HEPATITIS C ANTIBODY NR - - -       Rheumatoid Factor   Date Value Ref Range Status   08/28/2017 51 (H) <20 IU/mL Final     Cyclic Citrullinated Peptide Antibody, IgG   Date Value Ref Range Status   08/28/2017 15 (H) <7 U/mL Final     Comment:     Positive     EMERY Screen by EIA   Date Value Ref Range Status   12/30/2016 <1.0  Interpretation:  Negative   <1.0 Final     Hepatitis B Core Caron   Date Value Ref Range Status   04/12/2017 Nonreactive NR Final     Hep B Surface Agn   Date Value Ref Range Status    04/12/2017 Nonreactive NR Final     IGG   Date Value Ref Range Status   04/21/2017 940 695 - 1,620 mg/dL Final     IGA   Date Value Ref Range Status   04/21/2017 271 70 - 380 mg/dL Final     IGM   Date Value Ref Range Status   04/21/2017 395 (H) 60 - 265 mg/dL Final         Scribe Disclosure:  IGeorgette, am serving as a scribe to document services personally performed by Everett Austin MD at this visit, based upon the provider's statements to me. All documentation has been reviewed by the aforementioned provider prior to being entered into the official medical record.    Everett Austin MD

## 2020-02-18 NOTE — PATIENT INSTRUCTIONS
Diagnosis   1. rheumatoid arthritis, improved  2. Persistent cough: doubt contribution of methotrexate  3. Pointer finger stiffness.      Plan   1. Maintain current regimen of medication incuding Methotrexate 25 mg injected weekly, Leucovorin, and Folic acid.   While on methotrexate:   -- Check labs every 3 months (AST/ALT, Albumin, CBC with platelets)   -- Limit alcohol intake to 2 drinks weekly; use folate 1 mg daily.  --Tylenol 500 mg can be used as needed up to three times daily for nausea/headache associated with dosing.

## 2020-02-26 ENCOUNTER — OFFICE VISIT (OUTPATIENT)
Dept: WOUND CARE | Facility: CLINIC | Age: 60
End: 2020-02-26
Payer: COMMERCIAL

## 2020-02-26 ENCOUNTER — ANTICOAGULATION THERAPY VISIT (OUTPATIENT)
Dept: ANTICOAGULATION | Facility: CLINIC | Age: 60
End: 2020-02-26

## 2020-02-26 DIAGNOSIS — I26.92 CHRONIC SADDLE PULMONARY EMBOLISM WITHOUT ACUTE COR PULMONALE (H): ICD-10-CM

## 2020-02-26 DIAGNOSIS — I26.99 PULMONARY EMBOLISM, UNSPECIFIED CHRONICITY, UNSPECIFIED PULMONARY EMBOLISM TYPE, UNSPECIFIED WHETHER ACUTE COR PULMONALE PRESENT (H): ICD-10-CM

## 2020-02-26 DIAGNOSIS — M05.741 RHEUMATOID ARTHRITIS INVOLVING BOTH HANDS WITH POSITIVE RHEUMATOID FACTOR (H): ICD-10-CM

## 2020-02-26 DIAGNOSIS — I27.82 CHRONIC SADDLE PULMONARY EMBOLISM WITHOUT ACUTE COR PULMONALE (H): ICD-10-CM

## 2020-02-26 DIAGNOSIS — S31.109D WOUND OF LEFT GROIN, SUBSEQUENT ENCOUNTER: Primary | ICD-10-CM

## 2020-02-26 DIAGNOSIS — M05.742 RHEUMATOID ARTHRITIS INVOLVING BOTH HANDS WITH POSITIVE RHEUMATOID FACTOR (H): ICD-10-CM

## 2020-02-26 LAB — INR PPP: 2.98 (ref 0.86–1.14)

## 2020-02-26 NOTE — PROGRESS NOTES
Patient comes to wound clinic for wound assessment per request of dr. Cardona. She has history of a Open surgical wound due to ECMO : DISCHARGE DIAGNOSIS Pulmonary EmbolismPEA ArrestBilateral pleural effusionsVolume overloadSinus pauseHTN Sleep apnea  Left groin wound Pannus wound Clean gloves are donned and current dressing removed. Previous treatment includes: Polymem    First date of treatment: 10/18/19    Objective findings: improved    Location: left  groin      Open Post-operative wound: Yes    Wound measured.: pinprick open area    Wound Base: granulation    Subjective finding:     Pt states: doing well    Patient is assessed for discomfort which is: minimal    Today's status of the wound: essentially healed  Treatment: Removal of existing dressing, Visual inspection, Cleansing with Microklenz spray, keep pinprick spots covered with dry gauze and Vaseline if sticky  Pt received the following instructions: Cleansing with soap and water or Microklenz spray,   Apply HydroferaBlue dressing  and bandage Secure with: tape. Frequency:  daily   Signs and symptoms of infection taught.  Patient needs to be seen 2 weeks. Treated and follow-up appointment made. Annette Smart was available for supervision of care if needed or if questions should arise and regarding plan of care. Margot Dye RN, CWON

## 2020-02-26 NOTE — PROGRESS NOTES
ANTICOAGULATION FOLLOW-UP CLINIC VISIT    Patient Name:  Shira Rodriguez  Date:  2020  Contact Type:  Telephone    SUBJECTIVE:  Patient Findings     Comments:   Rationale for testing in one week is that the INR increased to 2.98.  Recommended one time dose of 7.5mg on Wed, instead of 10mg.  Updated calendar.        Clinical Outcomes     Comments:   Rationale for testing in one week is that the INR increased to 2.98.  Recommended one time dose of 7.5mg on Wed, instead of 10mg.  Updated calendar.           OBJECTIVE    INR   Date Value Ref Range Status   2020 2.98 (H) 0.86 - 1.14 Final       ASSESSMENT / PLAN  INR assessment THER    Recheck INR In: 1 WEEK    INR Location Clinic      Anticoagulation Summary  As of 2020    INR goal:   2.0-3.0   TTR:   43.4 % (4.3 mo)   INR used for dosin.98 (2020)   Warfarin maintenance plan:   10 mg (5 mg x 2) every Mon, Wed, Fri; 7.5 mg (5 mg x 1.5) all other days   Full warfarin instructions:   : 7.5 mg; Otherwise 10 mg every Mon, Wed, Fri; 7.5 mg all other days   Weekly warfarin total:   60 mg   Plan last modified:   Mariah Sellers RN (2020)   Next INR check:   3/4/2020   Priority:   High   Target end date:       Indications    Pulmonary emboli (H) [I26.99]             Anticoagulation Episode Summary     INR check location:       Preferred lab:       Send INR reminders to:   [facility]    Comments:   HIPPA Forms returned 19  OK to leave messages on Cell  [phone]      Anticoagulation Care Providers     Provider Role Specialty Phone number    Anjel Busby MD [facility] [phone]            See the Encounter Report to view Anticoagulation Flowsheet and Dosing Calendar (Go to Encounters tab in chart review, and find the Anticoagulation Therapy Visit)    INR/CFX/F2 RESULT: INR result is 2.98 on     ASSESSMENT:Left message for patient with results and dosing recommendations. Asked patient to call  back to report any missed doses, falls, signs and symptoms of bleeding or clotting, any changes in health, medication, or diet. Asked patient to call back with any questions or concerns.    DOSING ADJUSTMENT: 10mg on M, F, 7.5mg all other days    NEXT INR/FACTOR X OR FACTOR II: 3/4    PROTOCOL FOLLOWED:goal 2-3    INR jumped from 1.98 to 2.98 in two weeks.  May need to adjust maintenance dose.  Requested next INR in one week.    Rich Noe, RN

## 2020-03-03 DIAGNOSIS — I26.09 PULMONARY EMBOLISM WITH ACUTE COR PULMONALE, UNSPECIFIED CHRONICITY, UNSPECIFIED PULMONARY EMBOLISM TYPE (H): ICD-10-CM

## 2020-03-03 RX ORDER — WARFARIN SODIUM 5 MG/1
TABLET ORAL
Qty: 90 TABLET | Refills: 5 | Status: SHIPPED | OUTPATIENT
Start: 2020-03-03 | End: 2021-01-05

## 2020-03-12 ENCOUNTER — ANTICOAGULATION THERAPY VISIT (OUTPATIENT)
Dept: ANTICOAGULATION | Facility: CLINIC | Age: 60
End: 2020-03-12

## 2020-03-12 DIAGNOSIS — I26.92 CHRONIC SADDLE PULMONARY EMBOLISM WITHOUT ACUTE COR PULMONALE (H): ICD-10-CM

## 2020-03-12 DIAGNOSIS — I27.82 CHRONIC SADDLE PULMONARY EMBOLISM WITHOUT ACUTE COR PULMONALE (H): ICD-10-CM

## 2020-03-12 DIAGNOSIS — I26.99 PULMONARY EMBOLISM, UNSPECIFIED CHRONICITY, UNSPECIFIED PULMONARY EMBOLISM TYPE, UNSPECIFIED WHETHER ACUTE COR PULMONALE PRESENT (H): ICD-10-CM

## 2020-03-12 LAB
CAPILLARY BLOOD COLLECTION: NORMAL
INR BLD: 4.6 (ref 0.86–1.14)

## 2020-03-12 PROCEDURE — 85610 PROTHROMBIN TIME: CPT | Mod: QW | Performed by: FAMILY MEDICINE

## 2020-03-12 PROCEDURE — 36416 COLLJ CAPILLARY BLOOD SPEC: CPT | Performed by: FAMILY MEDICINE

## 2020-03-12 NOTE — PROGRESS NOTES
ANTICOAGULATION FOLLOW-UP CLINIC VISIT    Patient Name:  Shira Rodriguez  Date:  3/12/2020  Contact Type:  Telephone    SUBJECTIVE:  Patient Findings     Comments:   Not able to assess Warfarin dosing or s/sx's of bleeding or bruising         Clinical Outcomes     Comments:   Not able to assess Warfarin dosing or s/sx's of bleeding or bruising            OBJECTIVE    INR Point of Care   Date Value Ref Range Status   2020 4.6 (H) 0.86 - 1.14 Final     Comment:     This test is intended for monitoring Coumadin therapy.  Results are not   accurate in patients with prolonged INR due to factor deficiency.         ASSESSMENT / PLAN  INR assessment SUPRA    Recheck INR In: 1 WEEK    INR Location Clinic      Anticoagulation Summary  As of 3/12/2020    INR goal:   2.0-3.0   TTR:   39.1 % (4.8 mo)   INR used for dosin.6! (3/12/2020)   Warfarin maintenance plan:   10 mg (5 mg x 2) every Mon, Wed, Fri; 7.5 mg (5 mg x 1.5) all other days   Full warfarin instructions:   3/12: Hold; Otherwise 10 mg every Mon, Wed, Fri; 7.5 mg all other days   Weekly warfarin total:   60 mg   Plan last modified:   Mariah Sellers RN (2020)   Next INR check:   3/19/2020   Priority:   High   Target end date:       Indications    Pulmonary emboli (H) [I26.99]             Anticoagulation Episode Summary     INR check location:       Preferred lab:       Send INR reminders to:    [facility]    Comments:   HIPPA Forms returned 19  OK to leave messages on Cell  [phone]      Anticoagulation Care Providers     Provider Role Specialty Phone number    Anjel Busby MD [facility] [phone]            See the Encounter Report to view Anticoagulation Flowsheet and Dosing Calendar (Go to Encounters tab in chart review, and find the Anticoagulation Therapy Visit)    Left message for patient with results and dosing recommendations. Asked patient to call back to report any missed doses, falls, signs and  symptoms of bleeding or clotting, any changes in health, medication, or diet. Asked patient to call back with any questions or concerns.     Patience Martins RN

## 2020-03-12 NOTE — PROGRESS NOTES
Addendum 3/12/20 Pt called back reporting that she thought she was suppose tot take 10mg MF and 7.5mg ROW. Writer explained that was not our instructions and that we were looking for an INR sooner. Pt denies any s/sx's of bleeding and bruising. Writer went though the s/sx's of bleeding and bruising and when to go to the ED. Pt communicated understanding. Writer instructed pt to Hold her Warfarin tonight then take a 7.5mg dose tomorrow. Patience Martins RN

## 2020-03-19 ENCOUNTER — ANTICOAGULATION THERAPY VISIT (OUTPATIENT)
Dept: ANTICOAGULATION | Facility: CLINIC | Age: 60
End: 2020-03-19

## 2020-03-19 DIAGNOSIS — I26.99 PULMONARY EMBOLISM, UNSPECIFIED CHRONICITY, UNSPECIFIED PULMONARY EMBOLISM TYPE, UNSPECIFIED WHETHER ACUTE COR PULMONALE PRESENT (H): ICD-10-CM

## 2020-03-19 DIAGNOSIS — I26.92 CHRONIC SADDLE PULMONARY EMBOLISM WITHOUT ACUTE COR PULMONALE (H): ICD-10-CM

## 2020-03-19 DIAGNOSIS — I27.82 CHRONIC SADDLE PULMONARY EMBOLISM WITHOUT ACUTE COR PULMONALE (H): ICD-10-CM

## 2020-03-19 LAB
CAPILLARY BLOOD COLLECTION: NORMAL
INR BLD: 2.7 (ref 0.86–1.14)

## 2020-03-19 PROCEDURE — 85610 PROTHROMBIN TIME: CPT | Mod: QW | Performed by: FAMILY MEDICINE

## 2020-03-19 PROCEDURE — 36416 COLLJ CAPILLARY BLOOD SPEC: CPT | Performed by: FAMILY MEDICINE

## 2020-03-19 NOTE — PROGRESS NOTES
Addendum 20  Patient will have an INR done within the next week. [facility]                ANTICOAGULATION FOLLOW-UP CLINIC VISIT    Patient Name:  Shira Rodriguez  Date:  3/19/2020  Contact Type:  Telephone    SUBJECTIVE:         OBJECTIVE    INR Point of Care   Date Value Ref Range Status   2020 2.7 (H) 0.86 - 1.14 Final     Comment:     This test is intended for monitoring Coumadin therapy.  Results are not   accurate in patients with prolonged INR due to factor deficiency.         ASSESSMENT / PLAN  INR assessment THER    Recheck INR In: 2 WEEKS    INR Location Clinic      Anticoagulation Summary  As of 3/19/2020    INR goal:   2.0-3.0   TTR:   38.0 % (5 mo)   INR used for dosin.7 (3/19/2020)   Warfarin maintenance plan:   10 mg (5 mg x 2) every Mon, Fri; 7.5 mg (5 mg x 1.5) all other days   Full warfarin instructions:   10 mg every Mon, Fri; 7.5 mg all other days   Weekly warfarin total:   57.5 mg   No change documented:   Rich Noe RN   Plan last modified:   Patience Martins RN (3/12/2020)   Next INR check:   2020   Priority:   High   Target end date:       Indications    Pulmonary emboli (H) [I26.99]             Anticoagulation Episode Summary     INR check location:       Preferred lab:       Send INR reminders to:   [facility]    Comments:   HIPPA Forms returned 19  OK to leave messages on Cell  [phone]      Anticoagulation Care Providers     Provider Role Specialty Phone number    Anjel Busby MD [facility] [phone]            See the Encounter Report to view Anticoagulation Flowsheet and Dosing Calendar (Go to Encounters tab in chart review, and find the Anticoagulation Therapy Visit)    INR/CFX/F2 RESULT: INR result is 2.7    ASSESSMENT: Left message for patient with results and dosing recommendations. Asked patient to call back to report any missed doses, falls, signs and symptoms of bleeding or clotting, any changes in health,  medication, or diet. Asked patient to call back with any questions or concerns.    DOSING ADJUSTMENT: resume maintenance dose    NEXT INR/FACTOR X OR FACTOR II: 4/2    PROTOCOL FOLLOWED: goal 2-3    Rich Noe, RN

## 2020-04-02 ENCOUNTER — ANTICOAGULATION THERAPY VISIT (OUTPATIENT)
Dept: ANTICOAGULATION | Facility: CLINIC | Age: 60
End: 2020-04-02

## 2020-04-02 DIAGNOSIS — I26.92 CHRONIC SADDLE PULMONARY EMBOLISM WITHOUT ACUTE COR PULMONALE (H): ICD-10-CM

## 2020-04-02 DIAGNOSIS — I26.99 PULMONARY EMBOLISM, UNSPECIFIED CHRONICITY, UNSPECIFIED PULMONARY EMBOLISM TYPE, UNSPECIFIED WHETHER ACUTE COR PULMONALE PRESENT (H): ICD-10-CM

## 2020-04-02 DIAGNOSIS — I27.82 CHRONIC SADDLE PULMONARY EMBOLISM WITHOUT ACUTE COR PULMONALE (H): ICD-10-CM

## 2020-04-02 LAB
CAPILLARY BLOOD COLLECTION: NORMAL
INR BLD: 3.8 (ref 0.86–1.14)

## 2020-04-02 PROCEDURE — 85610 PROTHROMBIN TIME: CPT | Mod: QW | Performed by: FAMILY MEDICINE

## 2020-04-02 PROCEDURE — 36416 COLLJ CAPILLARY BLOOD SPEC: CPT | Performed by: FAMILY MEDICINE

## 2020-04-02 NOTE — PROGRESS NOTES
ANTICOAGULATION FOLLOW-UP CLINIC VISIT    Patient Name:  Shira Rodriguez  Date:  4/2/2020  Contact Type:  Telephone    SUBJECTIVE:  Patient Findings     Comments:   No identifiable reason INR is elevated today.         Clinical Outcomes     Comments:   No identifiable reason INR is elevated today.            OBJECTIVE    INR Point of Care   Date Value Ref Range Status   04/02/2020 3.8 (H) 0.86 - 1.14 Final     Comment:     This test is intended for monitoring Coumadin therapy.  Results are not   accurate in patients with prolonged INR due to factor deficiency.         ASSESSMENT / PLAN  No question data found.  Anticoagulation Summary  As of 4/2/2020    INR goal:   2.0-3.0   TTR:   37.0 % (5.5 mo)   INR used for dosing:   3.8! (4/2/2020)   Warfarin maintenance plan:   10 mg (5 mg x 2) every Mon; 7.5 mg (5 mg x 1.5) all other days   Full warfarin instructions:   10 mg every Mon; 7.5 mg all other days   Weekly warfarin total:   55 mg   Plan last modified:   Mariah Sellers RN (4/2/2020)   Next INR check:   4/14/2020   Priority:   High   Target end date:       Indications    Pulmonary emboli (H) [I26.99]             Anticoagulation Episode Summary     INR check location:       Preferred lab:       Send INR reminders to:   [facility]    Comments:   HIPPA Forms returned 11/13/19  OK to leave messages on Cell  [phone]      Anticoagulation Care Providers     Provider Role Specialty Phone number    Anjel Busby MD [facility] [phone]            See the Encounter Report to view Anticoagulation Flowsheet and Dosing Calendar (Go to Encounters tab in chart review, and find the Anticoagulation Therapy Visit)    Spoke with patient.     Mariah Sellers RN

## 2020-04-15 DIAGNOSIS — I26.99 PULMONARY EMBOLISM, UNSPECIFIED CHRONICITY, UNSPECIFIED PULMONARY EMBOLISM TYPE, UNSPECIFIED WHETHER ACUTE COR PULMONALE PRESENT (H): ICD-10-CM

## 2020-04-15 DIAGNOSIS — M05.742 RHEUMATOID ARTHRITIS INVOLVING BOTH HANDS WITH POSITIVE RHEUMATOID FACTOR (H): ICD-10-CM

## 2020-04-15 DIAGNOSIS — M05.741 RHEUMATOID ARTHRITIS INVOLVING BOTH HANDS WITH POSITIVE RHEUMATOID FACTOR (H): ICD-10-CM

## 2020-04-15 LAB
ERYTHROCYTE [DISTWIDTH] IN BLOOD BY AUTOMATED COUNT: 16.2 % (ref 10–15)
HCT VFR BLD AUTO: 46.8 % (ref 35–47)
HGB BLD-MCNC: 15.2 G/DL (ref 11.7–15.7)
INR PPP: 3.21 (ref 0.86–1.14)
MCH RBC QN AUTO: 29.7 PG (ref 26.5–33)
MCHC RBC AUTO-ENTMCNC: 32.5 G/DL (ref 31.5–36.5)
MCV RBC AUTO: 92 FL (ref 78–100)
PLATELET # BLD AUTO: 302 10E9/L (ref 150–450)
RBC # BLD AUTO: 5.11 10E12/L (ref 3.8–5.2)
WBC # BLD AUTO: 7 10E9/L (ref 4–11)

## 2020-04-15 PROCEDURE — 36415 COLL VENOUS BLD VENIPUNCTURE: CPT | Performed by: INTERNAL MEDICINE

## 2020-04-15 PROCEDURE — 85027 COMPLETE CBC AUTOMATED: CPT | Performed by: INTERNAL MEDICINE

## 2020-04-15 PROCEDURE — 85610 PROTHROMBIN TIME: CPT | Performed by: INTERNAL MEDICINE

## 2020-04-15 PROCEDURE — 84460 ALANINE AMINO (ALT) (SGPT): CPT | Performed by: INTERNAL MEDICINE

## 2020-04-15 PROCEDURE — 82565 ASSAY OF CREATININE: CPT | Performed by: INTERNAL MEDICINE

## 2020-04-15 PROCEDURE — 84450 TRANSFERASE (AST) (SGOT): CPT | Performed by: INTERNAL MEDICINE

## 2020-04-16 ENCOUNTER — ANTICOAGULATION THERAPY VISIT (OUTPATIENT)
Dept: ANTICOAGULATION | Facility: CLINIC | Age: 60
End: 2020-04-16

## 2020-04-16 LAB
ALT SERPL W P-5'-P-CCNC: 22 U/L (ref 0–50)
AST SERPL W P-5'-P-CCNC: 14 U/L (ref 0–45)
CREAT SERPL-MCNC: 0.7 MG/DL (ref 0.52–1.04)
GFR SERPL CREATININE-BSD FRML MDRD: >90 ML/MIN/{1.73_M2}

## 2020-04-16 NOTE — PROGRESS NOTES
ANTICOAGULATION FOLLOW-UP CLINIC VISIT    Patient Name:  Shira Rodriguez  Date:  4/16/2020  Contact Type:  Telephone    SUBJECTIVE:  Patient Findings     Comments:   Since INR trended down will continue with current warfarin dose.  Extension is given today for viral threat.  This was approved by [facility].         Clinical Outcomes     Comments:   Since INR trended down will continue with current warfarin dose.  Extension is given today for viral threat.  This was approved by [facility].            OBJECTIVE    INR   Date Value Ref Range Status   04/15/2020 3.21 (H) 0.86 - 1.14 Final       ASSESSMENT / PLAN  No question data found.  Anticoagulation Summary  As of 4/16/2020    INR goal:   2.0-3.0   TTR:   34.3 % (5.9 mo)   INR used for dosing:   3.21! (4/15/2020)   Warfarin maintenance plan:   10 mg (5 mg x 2) every Mon; 7.5 mg (5 mg x 1.5) all other days   Full warfarin instructions:   10 mg every Mon; 7.5 mg all other days   Weekly warfarin total:   55 mg   Plan last modified:   Mariah Sellers RN (4/2/2020)   Next INR check:   5/7/2020   Priority:   High   Target end date:       Indications    Pulmonary emboli (H) [I26.99]             Anticoagulation Episode Summary     INR check location:       Preferred lab:       Send INR reminders to:   [facility]    Comments:   HIPPA Forms returned 11/13/19  OK to leave messages on Cell  [phone]      Anticoagulation Care Providers     Provider Role Specialty Phone number    Anjel Busby MD [facility] [phone]            See the Encounter Report to view Anticoagulation Flowsheet and Dosing Calendar (Go to Encounters tab in chart review, and find the Anticoagulation Therapy Visit)    Spoke with patient.     Mariah Sellers RN

## 2020-04-17 ENCOUNTER — TELEPHONE (OUTPATIENT)
Dept: ORTHOPEDICS | Facility: CLINIC | Age: 60
End: 2020-04-17

## 2020-04-17 NOTE — TELEPHONE ENCOUNTER
Left message for patient to return clinic call regarding scheduling. Patient needs a Video  appointment for consult on injections with Dr Saxena prior to scheduling injections.  [email or web address]    Number to clinic provided, please assist in scheduling once patient returns clinic call.     Call Center OKAY TO SCHEDULE.    Thanks,   Hui Bradley  Primary Care   [facility]

## 2020-04-20 ENCOUNTER — VIRTUAL VISIT (OUTPATIENT)
Dept: ORTHOPEDICS | Facility: CLINIC | Age: 60
End: 2020-04-20
Payer: COMMERCIAL

## 2020-04-20 DIAGNOSIS — M17.11 PRIMARY OSTEOARTHRITIS OF RIGHT KNEE: Primary | ICD-10-CM

## 2020-04-20 PROCEDURE — 99213 OFFICE O/P EST LOW 20 MIN: CPT | Mod: 95 | Performed by: PREVENTIVE MEDICINE

## 2020-04-20 RX ORDER — TRAMADOL HYDROCHLORIDE 50 MG/1
50 TABLET ORAL 2 TIMES DAILY PRN
Qty: 15 TABLET | Refills: 0 | Status: SHIPPED | OUTPATIENT
Start: 2020-04-20 | End: 2020-05-05

## 2020-04-20 NOTE — PROGRESS NOTES
"Shira Rodriguez is a 59 year old female who is being evaluated via a billable video visit.      The patient has been notified of following:   After review of patient's medical issues this visit was conducted over the video phone, as opposed to in person, in effort to reduce risk of COVID-19 exposure.    \"This video visit will be conducted via a call between you and your physician/provider. We have found that certain health care needs can be provided without the need for an in-person physical exam.  This service lets us provide the care you need with a video conversation.  If a prescription is necessary we can send it directly to your pharmacy.  If lab work is needed we can place an order for that and you can then stop by our lab to have the test done at a later time.    Video visits are billed at different rates depending on your insurance coverage.  Please reach out to your insurance provider with any questions.    If during the course of the call the physician/provider feels a video visit is not appropriate, you will not be charged for this service.\"    Patient has given verbal consent for Video visit? Yes    How would you like to obtain your AVS? [email or web address]   Video start: 8:30am    Patient would like the video invitation sent by: Send to e-mail at: [email or web address]      Subjective     Shira Rodriguez is a 59 year old female who presents to clinic today for the following health issues: ongoing right knee pain      HISTORY OF PRESENT ILLNESS  Ms. Rodriguez is a pleasant 59 year old year old female who presents to clinic today with ongoing right knee pain  Had injection 3 months prior that had improved her ROM and use of herknee with less pain  She is wondering when we can plan another treatment for it  Currently she is using tylenol PRN  May need something a little stronger to use as needed until she is able to come in for a knee injection  It is continuing to swell and be more painful with walking and standing for " "longer periods    MEDICAL HISTORY  Patient Active Problem List   Diagnosis     Primary localized osteoarthrosis, pelvic region and thigh     Adjustment disorder with depressed mood     Tobacco use disorder     Morbid obesity (H)     Arthritis of knee, degenerative     Degeneration of cervical intervertebral disc     CYRUS (obstructive sleep apnea)     Elevated blood pressure (not hypertension)     Pulmonary emboli (H)     Rheumatoid arthritis (H)     Cardiac arrest (H)       Current Outpatient Medications   Medication Sig Dispense Refill     acetaminophen (TYLENOL) 325 MG tablet Take 2 tablets (650 mg) by mouth every 6 hours as needed for mild pain or fever       ARIPiprazole (ABILIFY) 5 MG tablet Take 1 tablet (5 mg) by mouth daily 90 tablet 3     atorvastatin (LIPITOR) 40 MG tablet Take 1 tablet (40 mg) by mouth every evening (Patient not taking: Reported on 2/18/2020) 90 tablet 3     DULoxetine (CYMBALTA) 60 MG capsule Take 1 capsule (60 mg) by mouth daily 30 capsule 0     enoxaparin ANTICOAGULANT (LOVENOX) 150 MG/ML syringe Inject 1.1 mLs (165 mg) Subcutaneous every 12 hours 6 Syringe 1     folic acid 1 MG PO tablet Take 3-4 tablets (3-4 mg) by mouth daily 120 tablet 7     furosemide (LASIX) 40 MG tablet Take 2 tablets (80 mg) by mouth daily 180 tablet 1     insulin syringe-needle U-100 (BD INSULIN SYRINGE ULTRAFINE) 30G X 1/2\" 1 ML miscellaneous For methotrexate use weekly 8 each prn     leucovorin 5 MG PO tablet Take 1 tablet (5 mg) by mouth every 7 days *take 24h after taking weekly methotrexate dose 4 tablet 3     lisinopril (PRINIVIL/ZESTRIL) 5 MG tablet Take 1 tablet (5 mg) by mouth daily 90 tablet 3     methotrexate 50 MG/2ML IJ injection Inject 1 mL (25 mg) Subcutaneous every 7 days 12 mL 4     nystatin (MYCOSTATIN) 256099 UNIT/GM external powder Apply to coat rash tid, neck and breast area rashes 60 g 3     order for DME Please discontinue all oxygen and collect equipment. (Patient not taking: Reported on " 2020) 1 Units 0     potassium chloride ER (MICRO-K) 10 MEQ CR capsule Take 1 capsule (10 mEq) by mouth 2 times daily 180 capsule 3     warfarin ANTICOAGULANT (COUMADIN) 5 MG tablet TAKE 1.5-2 TABLETS DAILY OR AS DIRECTED BY COUMADIN CLINIC. 90 tablet 5       Allergies   Allergen Reactions     Adhesive Tape Blisters     Erythromycin Rash       Family History   Problem Relation Age of Onset     Thyroid Disease Mother      Hypertension Mother      Skin Cancer Mother         MMohs surgery with skin graft     Cerebrovascular Disease Mother         CVA after endarderectomy     Diabetes Mother      Breast Cancer Paternal Grandmother      Pancreatic Cancer Paternal Grandmother      Cardiovascular Paternal Aunt      Cardiovascular Paternal Uncle      Depression Other      Alcoholism Father      Thyroid Disease Sister      Alcoholism Sister      Hypertension Maternal Grandmother      Heart Disease Sister         multiple ablations     Alcoholism Sister      Prostate Cancer Paternal Grandfather      Social History     Socioeconomic History     Marital status: Single     Spouse name: Not on file     Number of children: Not on file     Years of education: Not on file     Highest education level: Not on file   Occupational History     Occupation: registered nurse     Comment: [employer]   Social Needs     Financial resource strain: Not on file     Food insecurity     Worry: Not on file     Inability: Not on file     Transportation needs     Medical: Not on file     Non-medical: Not on file   Tobacco Use     Smoking status: Former Smoker     Packs/day: 1.00     Years: 33.00     Pack years: 33.00     Types: Cigarettes     Start date: 1982     Last attempt to quit: 2019     Years since quittin.6     Smokeless tobacco: Never Used   Substance and Sexual Activity     Alcohol use: Yes     Alcohol/week: 0.0 standard drinks     Comment: occassional     Drug use: No     Sexual activity: Never   Lifestyle     Physical  activity     Days per week: Not on file     Minutes per session: Not on file     Stress: Not on file   Relationships     Social connections     Talks on phone: Not on file     Gets together: Not on file     Attends Cheondoism service: Not on file     Active member of club or organization: Not on file     Attends meetings of clubs or organizations: Not on file     Relationship status: Not on file     Intimate partner violence     Fear of current or ex partner: Not on file     Emotionally abused: Not on file     Physically abused: Not on file     Forced sexual activity: Not on file   Other Topics Concern     Parent/sibling w/ CABG, MI or angioplasty before 65F 55M? Not Asked   Social History Narrative     Not on file       Additional medical/Social/Surgical histories reviewed in [facility] and updated as appropriate.     REVIEW OF SYSTEMS (4/20/2020)  10 point ROS of systems including Constitutional, Eyes, Respiratory, Cardiovascular, Gastroenterology, Genitourinary, Integumentary, Musculoskeletal, Psychiatric, Allergic/Immunologic were all negative except for pertinent positives noted in my HPI.     PHYSICAL EXAM    General  - normal appearance, in no obvious distress, overweight  HEENT  - conjunctivae not injected, moist mucous membranes, normocephalic/atraumatic head, ears normal appearance, no lesions, mouth normal appearance, no scars, normal dentition and teeth present  CV  - normal capillary refill in lower extremity  Pulm  - normal respiratory pattern, non-labored  Musculoskeletal - right knee  - stance: mildly antalgic gait, genu varum  - inspection: has some generalized swelling, trace effusion  - palpation: medial joint line tenderness when she palpates, patella and patellar tendon non-tender, and pain with palpation over her patella when she palpates   - ROM: 90 degrees flexion limited by pain and swelling, 0 degrees extension, painful active ROM  - strength: 5/5 in flexion, 5/5 in extension  - neuro: no sensory  or motor deficit reported    Neuro  -  grossly normal coordination, normal muscle tone  Skin  - no ecchymosis, erythema, warmth, or induration, no obvious rash  Psych  - interactive, appropriate, normal mood and affect    ASSESSMENT & PLAN  60 yo female with right knee djd, arthritis, not resolved, worse again  Will plan on repeat injection for knee after pandemic restrictions lifted  She will cont.tylenol  Will consider tramadol PRN  Will check with her PCP      Tashi Saxena MD, CAQSM          Video-Visit Details    Type of service:  Video Visit    Video End Time (time video stopped): 8:43am    Originating Location (pt. Location): Home    Distant Location (provider location):  [facility]     Mode of Communication:  Video Conference via [employer]    No follow-ups on file.       Tashi Saxena MD

## 2020-04-25 DIAGNOSIS — M05.79 RHEUMATOID ARTHRITIS INVOLVING MULTIPLE SITES WITH POSITIVE RHEUMATOID FACTOR (H): ICD-10-CM

## 2020-04-27 RX ORDER — PEN NEEDLE, DIABETIC 29 G X1/2"
NEEDLE, DISPOSABLE MISCELLANEOUS
Qty: 12 EACH | Refills: 3 | Status: SHIPPED | OUTPATIENT
Start: 2020-04-27 | End: 2021-05-18

## 2020-05-04 ENCOUNTER — OFFICE VISIT (OUTPATIENT)
Dept: ORTHOPEDICS | Facility: CLINIC | Age: 60
End: 2020-05-04
Payer: COMMERCIAL

## 2020-05-04 VITALS — BODY MASS INDEX: 58.91 KG/M2 | SYSTOLIC BLOOD PRESSURE: 116 MMHG | WEIGHT: 293 LBS | DIASTOLIC BLOOD PRESSURE: 80 MMHG

## 2020-05-04 DIAGNOSIS — M17.11 PRIMARY OSTEOARTHRITIS OF RIGHT KNEE: Primary | ICD-10-CM

## 2020-05-04 DIAGNOSIS — E66.01 MORBID OBESITY (H): ICD-10-CM

## 2020-05-04 PROCEDURE — 99207 ZZC DROP WITH A PROCEDURE: CPT | Performed by: PREVENTIVE MEDICINE

## 2020-05-04 PROCEDURE — 20611 DRAIN/INJ JOINT/BURSA W/US: CPT | Mod: RT | Performed by: PREVENTIVE MEDICINE

## 2020-05-04 RX ORDER — TRIAMCINOLONE ACETONIDE 40 MG/ML
40 INJECTION, SUSPENSION INTRA-ARTICULAR; INTRAMUSCULAR
Status: DISCONTINUED | OUTPATIENT
Start: 2020-05-04 | End: 2021-06-16

## 2020-05-04 RX ADMIN — TRIAMCINOLONE ACETONIDE 40 MG: 40 INJECTION, SUSPENSION INTRA-ARTICULAR; INTRAMUSCULAR at 08:13

## 2020-05-04 ASSESSMENT — PATIENT HEALTH QUESTIONNAIRE - PHQ9: SUM OF ALL RESPONSES TO PHQ QUESTIONS 1-9: 12

## 2020-05-04 NOTE — LETTER
5/4/2020         RE: Shira Rodriguez  [address]  [place] [address]        Dear Colleague,    Thank you for referring your patient, Shira Rodriguez, to the [facility]. Please see a copy of my visit note below.    Scribed by Xiomara Caicedo ATC for Dr. Saxena  on 5/4/20 at 8:20 AM, based on the providers statements to me.       [facility] FOLLOW-UP VISIT 5/4/2020    Shira Rodriguez's chief complaint for this visit includes:  Chief Complaint   Patient presents with     Procedure     right knee cortisone injection      PCP: Anjel Busby    Referring Provider:  No referring provider defined for this encounter.    Interval History:     Follow up reason: right knee cortisone injection     Pain: Worsening    Function: Worsening    Medical History:    Any recent changes to your medical history? No    Any new medication prescribed since last visit? No    Review of Systems:    Do you have fever, chills, weight loss? No    Do you have any vision problems? No    Do you have any chest pain or edema? No    Do you have any shortness of breath or wheezing?  No    Do you have stomach problems? No    Do you have any urinary track issues? No    Do you have any numbness or focal weakness? No    Do you have diabetes? No    Do you have problems with bleeding or clotting? No    Do you have an rashes or other skin lesions? No    Large Joint Injection/Arthocentesis: R knee joint    Date/Time: 5/4/2020 8:13 AM  Performed by: Tashi Saxena MD  Authorized by: Tashi Saxena MD     Indications:  Pain  Needle Size:  22 G  Guidance: ultrasound    Location:  Knee      Medications:  40 mg triamcinolone 40 MG/ML  Procedure discussed: discussed risks, benefits, and alternatives    Consent Given by:  Patient  Timeout: timeout called immediately prior to procedure    Prep: patient was prepped and draped in usual sterile fashion      Shira is presenting for right  knee injection as planned.  She states that it is getting worse and an injection will help her move and alleviate the constant pain that causes her to not walk normally and get through her day  Her pain is greater than 5/10, at a 8 /10 at worst  Diagnosis: right knee djd    Right Knee Injection - Ultrasound Guided  The patient was informed of the risks and the benefits of the procedure and a written consent was signed.  The patient s knee was prepped with chlorhexidine in sterile fashion.   40 mg of triamcinolone suspension was drawn up into a 5 mL syringe with 4 mL of 1% lidocaine.  Injection was performed using sterile technique.  Under ultrasound guidance a 1.5-inch 22-gauge needle was used to enter the superolateral aspect of the knee.  Needle placement was visualized and documented with ultrasound.  Ultrasound visualization was necessary due to decreased joint space in the setting of osteoarthritis and due to obesity.  Images were permanently stored for the patient's record.  There were no complications. The patient tolerated the procedure well. There was negligible bleeding.   The patient was instructed to ice the knee upon leaving clinic and refrain from overuse over the next 3 days.   The patient was instructed to call or go to the emergency room with any unusual pain, swelling, redness, or if otherwise concerned.        Again, thank you for allowing me to participate in the care of your patient.        Sincerely,        Tashi Saxena MD

## 2020-05-04 NOTE — PROGRESS NOTES
Scribed by Xiomara Caicedo ATC for Dr. Saxena  on 5/4/20 at 8:20 AM, based on the providers statements to me.       [facility] FOLLOW-UP VISIT 5/4/2020    Shira Rodriguez's chief complaint for this visit includes:  Chief Complaint   Patient presents with     Procedure     right knee cortisone injection      PCP: Anjel Busby    Referring Provider:  No referring provider defined for this encounter.    Interval History:     Follow up reason: right knee cortisone injection     Pain: Worsening    Function: Worsening    Medical History:    Any recent changes to your medical history? No    Any new medication prescribed since last visit? No    Review of Systems:    Do you have fever, chills, weight loss? No    Do you have any vision problems? No    Do you have any chest pain or edema? No    Do you have any shortness of breath or wheezing?  No    Do you have stomach problems? No    Do you have any urinary track issues? No    Do you have any numbness or focal weakness? No    Do you have diabetes? No    Do you have problems with bleeding or clotting? No    Do you have an rashes or other skin lesions? No    Large Joint Injection/Arthocentesis: R knee joint    Date/Time: 5/4/2020 8:13 AM  Performed by: Tashi Saxena MD  Authorized by: Tashi Saxena MD     Indications:  Pain  Needle Size:  22 G  Guidance: ultrasound    Location:  Knee      Medications:  40 mg triamcinolone 40 MG/ML  Procedure discussed: discussed risks, benefits, and alternatives    Consent Given by:  Patient  Timeout: timeout called immediately prior to procedure    Prep: patient was prepped and draped in usual sterile fashion      Shira is presenting for right knee injection as planned.  She states that it is getting worse and an injection will help her move and alleviate the constant pain that causes her to not walk normally and get through her day  Her pain is greater than 5/10, at a 8 /10 at worst  Diagnosis: right  knee djd    Right Knee Injection - Ultrasound Guided  The patient was informed of the risks and the benefits of the procedure and a written consent was signed.  The patient s knee was prepped with chlorhexidine in sterile fashion.   40 mg of triamcinolone suspension was drawn up into a 5 mL syringe with 4 mL of 1% lidocaine.  Injection was performed using sterile technique.  Under ultrasound guidance a 1.5-inch 22-gauge needle was used to enter the superolateral aspect of the knee.  Needle placement was visualized and documented with ultrasound.  Ultrasound visualization was necessary due to decreased joint space in the setting of osteoarthritis and due to obesity.  Images were permanently stored for the patient's record.  There were no complications. The patient tolerated the procedure well. There was negligible bleeding.   The patient was instructed to ice the knee upon leaving clinic and refrain from overuse over the next 3 days.   The patient was instructed to call or go to the emergency room with any unusual pain, swelling, redness, or if otherwise concerned.

## 2020-05-04 NOTE — PATIENT INSTRUCTIONS
Thanks for coming today.  Ortho[facility] Medicine Clinic  [address] [place] [address]    To schedule future appointments in Ortho Clinic, you may call [phone].    To schedule ordered imaging by your Provider: Call [facility] at [phone]    [place] available online at:   [email or web address]    Please call if any further questions or concerns [phone] and ask for the Orthopedic Department. Clinic hours 8 am to 5 pm.    Return to clinic if symptoms worsen.

## 2020-05-18 ENCOUNTER — ANTICOAGULATION THERAPY VISIT (OUTPATIENT)
Dept: ANTICOAGULATION | Facility: CLINIC | Age: 60
End: 2020-05-18

## 2020-05-18 DIAGNOSIS — I26.99 PULMONARY EMBOLISM, UNSPECIFIED CHRONICITY, UNSPECIFIED PULMONARY EMBOLISM TYPE, UNSPECIFIED WHETHER ACUTE COR PULMONALE PRESENT (H): ICD-10-CM

## 2020-05-18 DIAGNOSIS — I26.99 PULMONARY EMBOLI (H): ICD-10-CM

## 2020-05-18 LAB
CAPILLARY BLOOD COLLECTION: NORMAL
INR BLD: 2.5 (ref 0.86–1.14)

## 2020-05-18 PROCEDURE — 36416 COLLJ CAPILLARY BLOOD SPEC: CPT | Performed by: INTERNAL MEDICINE

## 2020-05-18 PROCEDURE — 85610 PROTHROMBIN TIME: CPT | Mod: QW | Performed by: INTERNAL MEDICINE

## 2020-05-18 NOTE — PROGRESS NOTES
ANTICOAGULATION FOLLOW-UP CLINIC VISIT    Patient Name:  Shira Rodriguez  Date:  2020  Contact Type:  Telephone    SUBJECTIVE:  Patient Findings     Comments:   Patient reports that she hasn't had a period in 10 years and is currently having some light vaginal bleeding.  Patient is instructed to call MD to discuss further plan of care.  Patient is agreeable to this.         Clinical Outcomes     Comments:   Patient reports that she hasn't had a period in 10 years and is currently having some light vaginal bleeding.  Patient is instructed to call MD to discuss further plan of care.  Patient is agreeable to this.            OBJECTIVE    Recent labs: (last 7 days)     20  1417   INR 2.5*       ASSESSMENT / PLAN  INR assessment THER    Recheck INR In: 4 WEEKS    INR Location Clinic      Anticoagulation Summary  As of 2020    INR goal:   2.0-3.0   TTR:   40.0 % (7 mo)   INR used for dosin.5 (2020)   Warfarin maintenance plan:   10 mg (5 mg x 2) every Mon; 7.5 mg (5 mg x 1.5) all other days   Full warfarin instructions:   10 mg every Mon; 7.5 mg all other days   Weekly warfarin total:   55 mg   No change documented:   Mariah Sellers RN   Plan last modified:   Mariah Sellers RN (2020)   Next INR check:   6/15/2020   Priority:   High   Target end date:       Indications    Pulmonary emboli (H) [I26.99]             Anticoagulation Episode Summary     INR check location:       Preferred lab:       Send INR reminders to:   [facility]    Comments:   HIPPA Forms returned 19  OK to leave messages on Cell  [phone]      Anticoagulation Care Providers     Provider Role Specialty Phone number    Anjel Busby MD [facility] [phone]            See the Encounter Report to view Anticoagulation Flowsheet and Dosing Calendar (Go to Encounters tab in chart review, and find the Anticoagulation Therapy Visit)    Spoke with Shira.     Mariah Sellers  RN

## 2020-06-12 ENCOUNTER — ANTICOAGULATION THERAPY VISIT (OUTPATIENT)
Dept: ANTICOAGULATION | Facility: CLINIC | Age: 60
End: 2020-06-12

## 2020-06-12 ENCOUNTER — OFFICE VISIT (OUTPATIENT)
Dept: FAMILY MEDICINE | Facility: CLINIC | Age: 60
End: 2020-06-12
Payer: COMMERCIAL

## 2020-06-12 VITALS
WEIGHT: 293 LBS | BODY MASS INDEX: 55.35 KG/M2 | HEART RATE: 87 BPM | DIASTOLIC BLOOD PRESSURE: 75 MMHG | OXYGEN SATURATION: 94 % | SYSTOLIC BLOOD PRESSURE: 114 MMHG

## 2020-06-12 DIAGNOSIS — M79.10 MYALGIA: ICD-10-CM

## 2020-06-12 DIAGNOSIS — G47.33 OSA (OBSTRUCTIVE SLEEP APNEA): ICD-10-CM

## 2020-06-12 DIAGNOSIS — R53.83 FATIGUE, UNSPECIFIED TYPE: Primary | ICD-10-CM

## 2020-06-12 DIAGNOSIS — I26.99 PULMONARY EMBOLISM, UNSPECIFIED CHRONICITY, UNSPECIFIED PULMONARY EMBOLISM TYPE, UNSPECIFIED WHETHER ACUTE COR PULMONALE PRESENT (H): ICD-10-CM

## 2020-06-12 DIAGNOSIS — I26.99 PULMONARY EMBOLI (H): ICD-10-CM

## 2020-06-12 DIAGNOSIS — R53.83 FATIGUE, UNSPECIFIED TYPE: ICD-10-CM

## 2020-06-12 DIAGNOSIS — R21 RASH: ICD-10-CM

## 2020-06-12 LAB
ALBUMIN SERPL-MCNC: 3.4 G/DL (ref 3.4–5)
ALP SERPL-CCNC: 82 U/L (ref 40–150)
ALT SERPL W P-5'-P-CCNC: 25 U/L (ref 0–50)
ANION GAP SERPL CALCULATED.3IONS-SCNC: 10 MMOL/L (ref 3–14)
AST SERPL W P-5'-P-CCNC: 14 U/L (ref 0–45)
BASOPHILS # BLD AUTO: 0 10E9/L (ref 0–0.2)
BASOPHILS NFR BLD AUTO: 0.4 %
BILIRUB SERPL-MCNC: 0.5 MG/DL (ref 0.2–1.3)
BUN SERPL-MCNC: 17 MG/DL (ref 7–30)
CALCIUM SERPL-MCNC: 9.7 MG/DL (ref 8.5–10.1)
CHLORIDE SERPL-SCNC: 106 MMOL/L (ref 94–109)
CK SERPL-CCNC: 31 U/L (ref 30–225)
CO2 SERPL-SCNC: 23 MMOL/L (ref 20–32)
CORTIS SERPL-MCNC: 15.2 UG/DL (ref 4–22)
CREAT SERPL-MCNC: 0.72 MG/DL (ref 0.52–1.04)
DIFFERENTIAL METHOD BLD: ABNORMAL
EOSINOPHIL # BLD AUTO: 0.1 10E9/L (ref 0–0.7)
EOSINOPHIL NFR BLD AUTO: 1.5 %
ERYTHROCYTE [DISTWIDTH] IN BLOOD BY AUTOMATED COUNT: 15.1 % (ref 10–15)
ERYTHROCYTE [SEDIMENTATION RATE] IN BLOOD BY WESTERGREN METHOD: 13 MM/H (ref 0–30)
GFR SERPL CREATININE-BSD FRML MDRD: >90 ML/MIN/{1.73_M2}
GLUCOSE SERPL-MCNC: 96 MG/DL (ref 70–99)
HCT VFR BLD AUTO: 47.9 % (ref 35–47)
HGB BLD-MCNC: 15.3 G/DL (ref 11.7–15.7)
IMM GRANULOCYTES # BLD: 0 10E9/L (ref 0–0.4)
IMM GRANULOCYTES NFR BLD: 0.4 %
INR PPP: 1.75 (ref 0.86–1.14)
LYMPHOCYTES # BLD AUTO: 1.2 10E9/L (ref 0.8–5.3)
LYMPHOCYTES NFR BLD AUTO: 16 %
MCH RBC QN AUTO: 30.1 PG (ref 26.5–33)
MCHC RBC AUTO-ENTMCNC: 31.9 G/DL (ref 31.5–36.5)
MCV RBC AUTO: 94 FL (ref 78–100)
MONOCYTES # BLD AUTO: 0.3 10E9/L (ref 0–1.3)
MONOCYTES NFR BLD AUTO: 4 %
NEUTROPHILS # BLD AUTO: 5.7 10E9/L (ref 1.6–8.3)
NEUTROPHILS NFR BLD AUTO: 77.7 %
NRBC # BLD AUTO: 0 10*3/UL
NRBC BLD AUTO-RTO: 0 /100
PLATELET # BLD AUTO: 283 10E9/L (ref 150–450)
POTASSIUM SERPL-SCNC: 3.9 MMOL/L (ref 3.4–5.3)
PROT SERPL-MCNC: 8.1 G/DL (ref 6.8–8.8)
RBC # BLD AUTO: 5.09 10E12/L (ref 3.8–5.2)
SODIUM SERPL-SCNC: 138 MMOL/L (ref 133–144)
TSH SERPL DL<=0.005 MIU/L-ACNC: 1.49 MU/L (ref 0.4–4)
WBC # BLD AUTO: 7.3 10E9/L (ref 4–11)

## 2020-06-12 RX ORDER — NYSTATIN 100000 [USP'U]/G
POWDER TOPICAL
Qty: 60 G | Refills: 3 | Status: SHIPPED | OUTPATIENT
Start: 2020-06-12 | End: 2022-06-20

## 2020-06-12 ASSESSMENT — PAIN SCALES - GENERAL: PAINLEVEL: MODERATE PAIN (5)

## 2020-06-12 NOTE — NURSING NOTE
Chief Complaint   Patient presents with     Fatigue     Pt has fatigue and body aches     Derm Problem     Discuss rash on skin.      MELISA Cruz 11:37 AM  6/12/2020

## 2020-06-12 NOTE — PROGRESS NOTES
SUBJECTIVE:    Pt is a 60 year old female with pmh of     Patient Active Problem List   Diagnosis     Primary localized osteoarthrosis, pelvic region and thigh     Adjustment disorder with depressed mood     Tobacco use disorder     Morbid obesity (H)     Arthritis of knee, degenerative     Degeneration of cervical intervertebral disc     CYRUS (obstructive sleep apnea)     Elevated blood pressure (not hypertension)     Pulmonary emboli (H)     Rheumatoid arthritis (H)     Cardiac arrest (H)       who is here for evaluation of had concerns including Fatigue (Pt has fatigue and body aches) and Derm Problem (Discuss rash on skin. ).    Here in f/u.  Very fatigued, myalgias, rash.  Rash: in flexor creases. Better w/ antifungal powder. Guardado. On immune suppressing meds, obese.  Lost wt, on purpose, eats less.  Depressed, sees psychology and psychiatry elsewhere  No edema, not sob, but always tired and so exhaused w/ exertion eg just vacuum small room has to take a break. Worse.  Not treating her CYRUS, discussed various ways to treat and due for followup  Off pred, used to be on, was very ill last year, at risk low adrenal fcn  Past Medical History:   Diagnosis Date     Chronic bronchitis (H) 07/30/2015     Contact dermatitis      Depressive disorder 1985     Eczema     HANDS     Elevated liver enzymes      H/O total knee replacement, left      History of total hip replacement 10/27/2011     Hyperlipidemia      Hypertension      Morbid obesity (H)      Osteoarthrosis     right knee     Sleep apnea      Tobacco use disorder      Past Surgical History:   Procedure Laterality Date     ARTHROPLASTY KNEE  6/14/2012    Procedure: ARTHROPLASTY KNEE;  Left Total Knee Arthroplasty;  Surgeon: Annette Quesada MD;  Location: [facility]     ARTHROSCOPY HIP Right      BRONCHOSCOPY FLEXIBLE AND RIGID N/A 9/17/2019    Procedure: Bronchoscopy flexible;  Surgeon: Patrick Rayo MD;  Location: [facility] OR     C TOTAL HIP ARTHROPLASTY   "07/09/04    RT     CV EXTRACORPERAL MEMBRANE OXYGENATION N/A 9/9/2019    Procedure: Extracorporeal Membrance Oxygenation;  Surgeon: Kaleb Mcfarlane MD;  Location:  [facility]     INSERT EXTRACORPORAL MEMBRANE OXYGENATOR N/A 9/17/2019    Procedure: Venous-Venous cannulation using ultrasound guidance and transesophageal echocardiogram, venous-arterial ecmo decannulation and repair of left femoral artery;  Surgeon: Patrick Rayo MD;  Location: [facility] OR     IR MECH THROMB PRIM ART, NON-INTRACRNL  9/10/2019     IR PULMONARY ANGIOGRAM BILATERAL  9/10/2019     IR THROMBOLYSIS ARTERIAL INFUSION INITIAL DAY  9/10/2019     THORACENTESIS N/A 1/16/2020    Procedure: THORACENTESIS;  Surgeon: Georgina Richardson MD;  Location:  GI     Allergies   Allergen Reactions     Adhesive Tape Blisters     Erythromycin Rash           Current Outpatient Medications   Medication Sig Dispense Refill     acetaminophen (TYLENOL) 325 MG tablet Take 2 tablets (650 mg) by mouth every 6 hours as needed for mild pain or fever       ARIPiprazole (ABILIFY) 5 MG tablet Take 1 tablet (5 mg) by mouth daily 90 tablet 3     DULoxetine (CYMBALTA) 60 MG capsule Take 1 capsule (60 mg) by mouth daily 30 capsule 0     folic acid 1 MG PO tablet Take 3-4 tablets (3-4 mg) by mouth daily 120 tablet 7     furosemide (LASIX) 40 MG tablet Take 2 tablets (80 mg) by mouth daily 180 tablet 1     insulin syringe-needle U-100 (BD INSULIN SYRINGE ULTRAFINE) 30G X 1/2\" 1 ML miscellaneous For Methotrexate use weekly. 12 each 3     leucovorin 5 MG PO tablet Take 1 tablet (5 mg) by mouth every 7 days *take 24h after taking weekly methotrexate dose 4 tablet 3     lisinopril (PRINIVIL/ZESTRIL) 5 MG tablet Take 1 tablet (5 mg) by mouth daily 90 tablet 3     methotrexate 50 MG/2ML IJ injection Inject 1 mL (25 mg) Subcutaneous every 7 days 12 mL 4     nystatin (MYCOSTATIN) 831709 UNIT/GM external powder Apply to coat rash tid, neck and breast area rashes 60 g " 3     potassium chloride ER (MICRO-K) 10 MEQ CR capsule Take 1 capsule (10 mEq) by mouth 2 times daily 180 capsule 3     warfarin ANTICOAGULANT (COUMADIN) 5 MG tablet TAKE 1.5-2 TABLETS DAILY OR AS DIRECTED BY COUMADIN CLINIC. 90 tablet 5     atorvastatin (LIPITOR) 40 MG tablet Take 1 tablet (40 mg) by mouth every evening (Patient not taking: Reported on 2020) 90 tablet 3     enoxaparin ANTICOAGULANT (LOVENOX) 150 MG/ML syringe Inject 1.1 mLs (165 mg) Subcutaneous every 12 hours 6 Syringe 1     order for DME Please discontinue all oxygen and collect equipment. (Patient not taking: Reported on 2020) 1 Units 0       Social History     Tobacco Use     Smoking status: Former Smoker     Packs/day: 1.00     Years: 33.00     Pack years: 33.00     Types: Cigarettes     Start date: 1982     Last attempt to quit: 2019     Years since quittin.7     Smokeless tobacco: Never Used   Substance Use Topics     Alcohol use: Yes     Alcohol/week: 0.0 standard drinks     Comment: occassional     Drug use: No           OBJECTIVE:  /75   Pulse 87   Wt (!) 155.5 kg (342 lb 14.4 oz)   SpO2 94%   BMI 55.35 kg/m    GENERAL APPEARANCE: Alert, no acute distress  EYES: PERRL, EOM normal, conjunctiva and lids normal  HENT: Ears and TMs normal, oral mucosa and posterior oropharynx normal  NECK: No adenopathy,masses or thyromegaly  SKIN: left anterior elbow one inch red patch  NEURO: Alert, oriented, speech and mentation normal  PSYCHE: mentation appears normal, affect and mood normal-tearful describing multipole symptoms and frustrations with those    ASSESSMENT/PLAN:    Rashes: likely fungal, keep clean and dry, cont as is, she will ask her derm in [place]  Fatigue, easy exhaustion: not short of breath or dizzy. Labs  See CYRUS MD. Consider med side effects.  Has f/u soon w/ pulm, rheum    AMANDA JULIEN MD

## 2020-06-12 NOTE — PATIENT INSTRUCTIONS
[facility] Phone Number [phone]  [facility] Medication Refill Request Information:  * Please contact your pharmacy regarding ANY request for medication refills.  ** [facility] Prescription Fax = [phone]  * Please allow 3 business days for routine medication refills.  * Please allow 5 business days for controlled substance medication refills.     [facility] Test Result notification information:  *You will be notified with in 7-10 days of your appointment day regarding the results of your test.  If you are on MyChart you will be notified as soon as the provider has reviewed the results and signed off on them.

## 2020-06-12 NOTE — PROGRESS NOTES
ANTICOAGULATION FOLLOW-UP CLINIC VISIT    Patient Name:  Shira Rodriguez  Date:  2020  Contact Type:  Telephone    SUBJECTIVE:  Patient Findings     Positives:   Missed doses    Comments:   Patient missed last 2 days of meds.         Clinical Outcomes     Comments:   Patient missed last 2 days of meds.            OBJECTIVE    Recent labs: (last 7 days)     20  1250   INR 1.75*       ASSESSMENT / PLAN  No question data found.  Anticoagulation Summary  As of 2020    INR goal:   2.0-3.0   TTR:   42.8 % (7.8 mo)   INR used for dosin.75! (2020)   Warfarin maintenance plan:   10 mg (5 mg x 2) every Mon; 7.5 mg (5 mg x 1.5) all other days   Full warfarin instructions:   10 mg every Mon; 7.5 mg all other days   Weekly warfarin total:   55 mg   No change documented:   Mariah Sellers RN   Plan last modified:   Mariah Sellers RN (2020)   Next INR check:   2020   Priority:   High   Target end date:       Indications    Pulmonary emboli (H) [I26.99]             Anticoagulation Episode Summary     INR check location:       Preferred lab:       Send INR reminders to:   [facility]    Comments:   HIPPA Forms returned 19  OK to leave messages on Cell  [phone]      Anticoagulation Care Providers     Provider Role Specialty Phone number    Anjel Busby MD [facility] [phone]            See the Encounter Report to view Anticoagulation Flowsheet and Dosing Calendar (Go to Encounters tab in chart review, and find the Anticoagulation Therapy Visit)    Spoke with patient.     Mariah Sellers RN

## 2020-06-15 LAB — B BURGDOR IGG+IGM SER QL: 0.79 (ref 0–0.89)

## 2020-07-30 ENCOUNTER — ANTICOAGULATION THERAPY VISIT (OUTPATIENT)
Dept: ANTICOAGULATION | Facility: CLINIC | Age: 60
End: 2020-07-30

## 2020-07-30 DIAGNOSIS — I26.99 PULMONARY EMBOLISM, UNSPECIFIED CHRONICITY, UNSPECIFIED PULMONARY EMBOLISM TYPE, UNSPECIFIED WHETHER ACUTE COR PULMONALE PRESENT (H): ICD-10-CM

## 2020-07-30 DIAGNOSIS — I26.99 PULMONARY EMBOLI (H): ICD-10-CM

## 2020-07-30 LAB
CAPILLARY BLOOD COLLECTION: NORMAL
INR BLD: 3 (ref 0.86–1.14)

## 2020-07-30 PROCEDURE — 36416 COLLJ CAPILLARY BLOOD SPEC: CPT | Performed by: FAMILY MEDICINE

## 2020-07-30 PROCEDURE — 85610 PROTHROMBIN TIME: CPT | Mod: QW | Performed by: FAMILY MEDICINE

## 2020-07-30 NOTE — PROGRESS NOTES
ANTICOAGULATION FOLLOW-UP CLINIC VISIT    Patient Name:  Shira Rodriguez  Date:  7/30/2020  Contact Type:  Telephone    SUBJECTIVE:         OBJECTIVE    Recent labs: (last 7 days)     07/30/20  1430   INR 3.0*       ASSESSMENT / PLAN  INR assessment THER    Recheck INR In: 3 WEEKS    INR Location Clinic      Anticoagulation Summary  As of 7/30/2020    INR goal:   2.0-3.0   TTR:   49.1 % (9.4 mo)   INR used for dosing:   3.0 (7/30/2020)   Warfarin maintenance plan:   10 mg (5 mg x 2) every Mon; 7.5 mg (5 mg x 1.5) all other days   Full warfarin instructions:   10 mg every Mon; 7.5 mg all other days   Weekly warfarin total:   55 mg   No change documented:   Tea Deng RN   Plan last modified:   Mariah Sellers RN (4/2/2020)   Next INR check:   8/20/2020   Priority:   High   Target end date:       Indications    Pulmonary emboli (H) [I26.99]             Anticoagulation Episode Summary     INR check location:       Preferred lab:       Send INR reminders to:   [facility]    Comments:   HIPPA Forms returned 11/13/19  OK to leave messages on Cell  [phone]      Anticoagulation Care Providers     Provider Role Specialty Phone number    Anjel Busby MD [facility] [phone]            See the Encounter Report to view Anticoagulation Flowsheet and Dosing Calendar (Go to Encounters tab in chart review, and find the Anticoagulation Therapy Visit)    Left message for patient with results and dosing recommendations. Asked patient to call back to report any missed doses, falls, signs and symptoms of bleeding or clotting, any changes in health, medication, or diet. Asked patient to call back with any questions or concerns.  On message, recommended Shira eat an extra serving or two of green vegetables per week.    Tea Deng, RN

## 2020-08-03 ENCOUNTER — VIRTUAL VISIT (OUTPATIENT)
Dept: SLEEP MEDICINE | Facility: CLINIC | Age: 60
End: 2020-08-03
Payer: COMMERCIAL

## 2020-08-03 ENCOUNTER — TELEPHONE (OUTPATIENT)
Dept: SLEEP MEDICINE | Facility: CLINIC | Age: 60
End: 2020-08-03

## 2020-08-03 VITALS — BODY MASS INDEX: 45.99 KG/M2 | HEIGHT: 67 IN | WEIGHT: 293 LBS

## 2020-08-03 DIAGNOSIS — G47.33 OSA (OBSTRUCTIVE SLEEP APNEA): ICD-10-CM

## 2020-08-03 PROCEDURE — 99215 OFFICE O/P EST HI 40 MIN: CPT | Mod: 95 | Performed by: PSYCHIATRY & NEUROLOGY

## 2020-08-03 RX ORDER — ZOLPIDEM TARTRATE 6.25 MG/1
TABLET, FILM COATED, EXTENDED RELEASE ORAL
Qty: 1 TABLET | Refills: 0 | Status: SHIPPED | OUTPATIENT
Start: 2020-08-03 | End: 2021-05-18

## 2020-08-03 ASSESSMENT — MIFFLIN-ST. JEOR: SCORE: 2244.54

## 2020-08-03 NOTE — PROGRESS NOTES
"Shira Rodriguez is a 60 year old female who is being evaluated via a billable video visit.      The patient has been notified of following:     \"This video visit will be conducted via a call between you and your physician/provider. We have found that certain health care needs can be provided without the need for an in-person physical exam.  This service lets us provide the care you need with a video conversation.  If a prescription is necessary we can send it directly to your pharmacy.  If lab work is needed we can place an order for that and you can then stop by our lab to have the test done at a later time.    Video visits are billed at different rates depending on your insurance coverage.  Please reach out to your insurance provider with any questions.    If during the course of the call the physician/provider feels a video visit is not appropriate, you will not be charged for this service.\"    Patient has given verbal consent for Video visit? Yes  How would you like to obtain your AVS? MyChart  If you are dropped from the video visit, the video invite should be resent to: Send to e-mail at: [email or web address]  Will anyone else be joining your video visit? No    Patient is now willing to pursue a titration PSG with zolpidem CR to establish whether this will help her keep PAP on as well as whether the zolpidem would suppress respiration.      Please see previous notes.     Unfortunately we are not able to do titration studies at this time due to COVID but I will put her on the urgent list to be done as soon as titration PSG are allowed.     If she is still having difficulty keeping her mask on I would like her to see Dr Cordova to evaluate for nasal obstruction.      Patient currently sleeping with HOB elevated to 45 degrees. She has an adjustable bed.  We will continue to do that for this study.      She has quit smoking which is excellent but still has some nocturnal coughing issues which she is working on. "     Considering that it may be a long time until she can get a PSG and the potential life threatening nature of her poorly controlled pulmonary disease in the time of COVID we discussed trying an OTC cough suppresant guaifenesin for a few nights to see if that might help her improve adherance as a bridge until we can get the PSG done. She agrees with the plan.     She has been advised not to drive if tired or sleepy.  She indicates she understands.     She has been home from COVID for 4 months and is very cautious leaving the house for good reason.        Video-Visit Details    Type of service:  Video Visit    Video Start Time: 0921  Video End Time: 1002    Originating Location (pt. Location): Home    Distant Location (provider location):  [facility]     Platform used for Video Visit: Tu Greenfield MD

## 2020-08-03 NOTE — PATIENT INSTRUCTIONS
Your BMI is Body mass index is 55.97 kg/m .  Weight management is a personal decision.  If you are interested in exploring weight loss strategies, the following discussion covers the approaches that may be successful. Body mass index (BMI) is one way to tell whether you are at a healthy weight, overweight, or obese. It measures your weight in relation to your height.  A BMI of 18.5 to 24.9 is in the healthy range. A person with a BMI of 25 to 29.9 is considered overweight, and someone with a BMI of 30 or greater is considered obese. More than two-thirds of American adults are considered overweight or obese.  Being overweight or obese increases the risk for further weight gain. Excess weight may lead to heart disease and diabetes.  Creating and following plans for healthy eating and physical activity may help you improve your health.  Weight control is part of healthy lifestyle and includes exercise, emotional health, and healthy eating habits. Careful eating habits lifelong are the mainstay of weight control. Though there are significant health benefits from weight loss, long-term weight loss with diet alone may be very difficult to achieve- studies show long-term success with dietary management in less than 10% of people. Attaining a healthy weight may be especially difficult to achieve in those with severe obesity. In some cases, medications, devices and surgical management might be considered.  What can you do?  If you are overweight or obese and are interested in methods for weight loss, you should discuss this with your provider.     Consider reducing daily calorie intake by 500 calories.     Keep a food journal.     Avoiding skipping meals, consider cutting portions instead.    Diet combined with exercise helps maintain muscle while optimizing fat loss. Strength training is particularly important for building and maintaining muscle mass. Exercise helps reduce stress, increase energy, and improves fitness.  Increasing exercise without diet control, however, may not burn enough calories to loose weight.       Start walking three days a week 10-20 minutes at a time    Work towards walking thirty minutes five days a week     Eventually, increase the speed of your walking for 1-2 minutes at time    In addition, we recommend that you review healthy lifestyles and methods for weight loss available through the National Institutes of Health patient information sites:  http://win.niddk.nih.gov/publications/index.htm    And look into health and wellness programs that may be available through your health insurance provider, employer, local community center, or [employer].    Weight management plan: Patient was referred to their PCP to discuss a diet and exercise plan.

## 2020-09-01 ENCOUNTER — ANTICOAGULATION THERAPY VISIT (OUTPATIENT)
Dept: ANTICOAGULATION | Facility: CLINIC | Age: 60
End: 2020-09-01

## 2020-09-01 DIAGNOSIS — Z79.01 LONG TERM CURRENT USE OF ANTICOAGULANT THERAPY: Primary | ICD-10-CM

## 2020-09-01 DIAGNOSIS — I26.99 PULMONARY EMBOLI (H): ICD-10-CM

## 2020-09-01 NOTE — PROGRESS NOTES
9/1/20 Sent annual Anticoagulation renewal and standing INR lab orders to Dr. Busby for electronic signature.    Did not send first letter reminder, patient has an appt on 9/8.  Updated calendar.  Gaps in INR results noted.  ELLIS Kendall   12-Nov-2017

## 2020-09-04 ENCOUNTER — ANCILLARY PROCEDURE (OUTPATIENT)
Dept: CT IMAGING | Facility: CLINIC | Age: 60
End: 2020-09-04
Attending: INTERNAL MEDICINE
Payer: COMMERCIAL

## 2020-09-04 ENCOUNTER — ANTICOAGULATION THERAPY VISIT (OUTPATIENT)
Dept: ANTICOAGULATION | Facility: CLINIC | Age: 60
End: 2020-09-04

## 2020-09-04 DIAGNOSIS — R93.89 ABNORMAL CHEST CT: ICD-10-CM

## 2020-09-04 DIAGNOSIS — J96.01 ACUTE RESPIRATORY FAILURE WITH HYPOXIA (H): ICD-10-CM

## 2020-09-04 DIAGNOSIS — R94.2 ABNORMAL PFT: ICD-10-CM

## 2020-09-04 DIAGNOSIS — I26.99 PULMONARY EMBOLISM, UNSPECIFIED CHRONICITY, UNSPECIFIED PULMONARY EMBOLISM TYPE, UNSPECIFIED WHETHER ACUTE COR PULMONALE PRESENT (H): ICD-10-CM

## 2020-09-04 DIAGNOSIS — M05.742 RHEUMATOID ARTHRITIS INVOLVING BOTH HANDS WITH POSITIVE RHEUMATOID FACTOR (H): ICD-10-CM

## 2020-09-04 DIAGNOSIS — M05.741 RHEUMATOID ARTHRITIS INVOLVING BOTH HANDS WITH POSITIVE RHEUMATOID FACTOR (H): ICD-10-CM

## 2020-09-04 DIAGNOSIS — Z79.01 LONG TERM CURRENT USE OF ANTICOAGULANT THERAPY: ICD-10-CM

## 2020-09-04 DIAGNOSIS — I26.99 PULMONARY EMBOLI (H): ICD-10-CM

## 2020-09-04 LAB
6 MIN WALK (FT): 590 FT
6 MIN WALK (M): 180 M
ALT SERPL W P-5'-P-CCNC: 26 U/L (ref 0–50)
AST SERPL W P-5'-P-CCNC: 19 U/L (ref 0–45)
CREAT SERPL-MCNC: 0.76 MG/DL (ref 0.52–1.04)
DLCOUNC-%PRED-PRE: 94 %
DLCOUNC-PRE: 20.42 ML/MIN/MMHG
DLCOUNC-PRED: 21.71 ML/MIN/MMHG
ERV-%PRED-PRE: 833 %
ERV-PRE: 0.45 L
ERV-PRED: 0.05 L
ERYTHROCYTE [DISTWIDTH] IN BLOOD BY AUTOMATED COUNT: 14.7 % (ref 10–15)
EXPTIME-PRE: 6.33 SEC
FEF2575-%PRED-PRE: 81 %
FEF2575-PRE: 1.97 L/SEC
FEF2575-PRED: 2.42 L/SEC
FEFMAX-%PRED-PRE: 63 %
FEFMAX-PRE: 4.26 L/SEC
FEFMAX-PRED: 6.66 L/SEC
FEV1-%PRED-PRE: 74 %
FEV1-PRE: 2 L
FEV1FEV6-PRE: 81 %
FEV1FEV6-PRED: 81 %
FEV1FVC-PRE: 81 %
FEV1FVC-PRED: 79 %
FEV1SVC-PRE: 82 %
FEV1SVC-PRED: 76 %
FIFMAX-PRE: 2.21 L/SEC
FRCPLETH-%PRED-PRE: 75 %
FRCPLETH-PRE: 2.13 L
FRCPLETH-PRED: 2.81 L
FVC-%PRED-PRE: 72 %
FVC-PRE: 2.48 L
FVC-PRED: 3.43 L
GFR SERPL CREATININE-BSD FRML MDRD: 86 ML/MIN/{1.73_M2}
HCT VFR BLD AUTO: 48.5 % (ref 35–47)
HGB BLD-MCNC: 15.8 G/DL (ref 11.7–15.7)
IC-%PRED-PRE: 57 %
IC-PRE: 2 L
IC-PRED: 3.49 L
INR PPP: 3.12 (ref 0.86–1.14)
MCH RBC QN AUTO: 30.2 PG (ref 26.5–33)
MCHC RBC AUTO-ENTMCNC: 32.6 G/DL (ref 31.5–36.5)
MCV RBC AUTO: 93 FL (ref 78–100)
PLATELET # BLD AUTO: 313 10E9/L (ref 150–450)
RBC # BLD AUTO: 5.23 10E12/L (ref 3.8–5.2)
RVPLETH-%PRED-PRE: 85 %
RVPLETH-PRE: 1.68 L
RVPLETH-PRED: 1.98 L
TLCPLETH-%PRED-PRE: 78 %
TLCPLETH-PRE: 4.13 L
TLCPLETH-PRED: 5.27 L
VA-%PRED-PRE: 67 %
VA-PRE: 3.5 L
VC-%PRED-PRE: 68 %
VC-PRE: 2.44 L
VC-PRED: 3.55 L
WBC # BLD AUTO: 8.8 10E9/L (ref 4–11)

## 2020-09-04 NOTE — PROGRESS NOTES
ANTICOAGULATION FOLLOW-UP CLINIC VISIT    Patient Name:  Shira Rodriguez  Date:  9/4/2020  Contact Type:  Telephone    SUBJECTIVE:  Patient Findings     Comments:   Left message for patient.        Clinical Outcomes     Comments:   Left message for patient.           OBJECTIVE    Recent labs: (last 7 days)     09/04/20  1308   INR 3.12*       ASSESSMENT / PLAN  INR assessment SUPRA    Recheck INR In: 2 WEEKS    INR Location Clinic      Anticoagulation Summary  As of 9/4/2020    INR goal:   2.0-3.0   TTR:   43.6 % (10.6 mo)   INR used for dosing:   3.12! (9/4/2020)   Warfarin maintenance plan:   10 mg (5 mg x 2) every Mon; 7.5 mg (5 mg x 1.5) all other days   Full warfarin instructions:   10 mg every Mon; 7.5 mg all other days   Weekly warfarin total:   55 mg   No change documented:   Rich Noe RN   Plan last modified:   Mariah Sellers RN (4/2/2020)   Next INR check:   9/15/2020   Priority:   High   Target end date:   Indefinite    Indications    Pulmonary emboli (H) [I26.99]  Long term current use of anticoagulant therapy [Z79.01]             Anticoagulation Episode Summary     INR check location:       Preferred lab:       Send INR reminders to:   [facility]    Comments:   HIPPA Forms returned 11/13/19  OK to leave messages on Cell  [phone]      Anticoagulation Care Providers     Provider Role Specialty Phone number    Anjel Busby MD Referring [facility] [phone]            See the Encounter Report to view Anticoagulation Flowsheet and Dosing Calendar (Go to Encounters tab in chart review, and find the Anticoagulation Therapy Visit)    INR/CFX/F2 RESULT:INR result is 3.,12    ASSESSMENT:Left message for patient with results and dosing recommendations. Asked patient to call back to report any missed doses, falls, signs and symptoms of bleeding or clotting, any changes in health, medication, or diet. Asked patient to call back with any questions or concerns.    Recommended  patient eat an extra serving of Vitamin K today.    DOSING ADJUSTMENT: continue dosing pattern    NEXT INR/FACTOR X OR FACTOR II:9/15 at Rheumatology appt     PROTOCOL FOLLOWED:goal 2-3    Rich Noe RN

## 2020-09-08 ENCOUNTER — VIRTUAL VISIT (OUTPATIENT)
Dept: PULMONOLOGY | Facility: CLINIC | Age: 60
End: 2020-09-08
Attending: INTERNAL MEDICINE
Payer: COMMERCIAL

## 2020-09-08 DIAGNOSIS — Z86.711 HISTORY OF PULMONARY EMBOLISM: ICD-10-CM

## 2020-09-08 DIAGNOSIS — R06.09 DYSPNEA ON EXERTION: Primary | ICD-10-CM

## 2020-09-08 DIAGNOSIS — R94.2 ABNORMAL PFT: ICD-10-CM

## 2020-09-08 DIAGNOSIS — R93.89 ABNORMAL CHEST CT: ICD-10-CM

## 2020-09-08 DIAGNOSIS — J90 PLEURAL EFFUSION: ICD-10-CM

## 2020-09-08 NOTE — PROGRESS NOTES
"Shira Rodriguez is a 60 year old female who is being evaluated via a billable video visit.      The patient has been notified of following:     \"This video visit will be conducted via a call between you and your physician/provider. We have found that certain health care needs can be provided without the need for an in-person physical exam.  This service lets us provide the care you need with a video conversation.  If a prescription is necessary we can send it directly to your pharmacy.  If lab work is needed we can place an order for that and you can then stop by our lab to have the test done at a later time.    Video visits are billed at different rates depending on your insurance coverage.  Please reach out to your insurance provider with any questions.    If during the course of the call the physician/provider feels a video visit is not appropriate, you will not be charged for this service.\"    Patient has given verbal consent for Video visit? Yes  How would you like to obtain your AVS? MyChart  If you are dropped from the video visit, the video invite should be resent to: Text to cell phone: [phone]  Will anyone else be joining your video visit? No        Video-Visit Details    Type of service:  Video Visit    Start: 09/08/2020 03:56 pm   Stop: 09/08/2020 04:33 pm    Originating Location (pt. Location): Home    Distant Location (provider location):  [facility]     Platform used for Video Visit: Tu Richardson MD      [facility] Pulmonology Follow Up Visit    Reason for Visit  Shira Rodriguez is a 59 year old female who is followed for dyspnea, obesity, nicotine dependence   Pulmonary HPI  She continues to have a productive cough, improved from when she was smoking, but she quit about a year ago when she had PE with cardiac arrest. She is not using CPAP at all now, has been unable to keep it on, takes it off during sleep. She is planning to " "have a repeat sleep study soon. After rehab she was able to stop supplemental oxygen, didn't need it any more. She is not working right now, quit summer 2019 because of fatigue and to focus on health. For the cough, she took dextromethorphan without relief. She has no inhaled medications.     She was feeling badly through the summer worsening dyspnea, was very sedentary prior to diagnosis with PE.  She has CPAP but not doing at all right now, had trouble keeping it on. She was advised to use Ambien    She has RA on methotrexate has been on about 2 years.     The patient was seen and examined by Georgina Richardson MD   Current Outpatient Medications   Medication     acetaminophen (TYLENOL) 325 MG tablet     ARIPiprazole (ABILIFY) 5 MG tablet     atorvastatin (LIPITOR) 40 MG tablet     DULoxetine (CYMBALTA) 60 MG capsule     folic acid 1 MG PO tablet     insulin syringe-needle U-100 (BD INSULIN SYRINGE ULTRAFINE) 30G X 1/2\" 1 ML miscellaneous     leucovorin 5 MG PO tablet     lisinopril (PRINIVIL/ZESTRIL) 5 MG tablet     methotrexate 50 MG/2ML IJ injection     nystatin (MYCOSTATIN) 892866 UNIT/GM external powder     order for DME     warfarin ANTICOAGULANT (COUMADIN) 5 MG tablet     furosemide (LASIX) 40 MG tablet     potassium chloride ER (MICRO-K) 10 MEQ CR capsule     zolpidem ER (AMBIEN CR) 6.25 MG CR tablet     Current Facility-Administered Medications   Medication     lidocaine (PF) (XYLOCAINE) 1 % injection 4 mL     triamcinolone (KENALOG-40) injection 40 mg     triamcinolone (KENALOG-40) injection 40 mg     Facility-Administered Medications Ordered in Other Visits   Medication     sodium chloride (PF) 0.9% PF flush 10 mL     Allergies   Allergen Reactions     Adhesive Tape Blisters     Erythromycin Rash     Social History     Socioeconomic History     Marital status: Single     Spouse name: Not on file     Number of children: Not on file     Years of education: Not on file     Highest education level: Not on " file   Occupational History     Occupation: registered nurse     Comment: [employer]   Social Needs     Financial resource strain: Not on file     Food insecurity     Worry: Not on file     Inability: Not on file     Transportation needs     Medical: Not on file     Non-medical: Not on file   Tobacco Use     Smoking status: Former Smoker     Packs/day: 1.00     Years: 33.00     Pack years: 33.00     Types: Cigarettes     Start date: 1982     Last attempt to quit: 2019     Years since quittin.0     Smokeless tobacco: Never Used   Substance and Sexual Activity     Alcohol use: Yes     Alcohol/week: 0.0 standard drinks     Comment: occassional     Drug use: No     Sexual activity: Never   Lifestyle     Physical activity     Days per week: Not on file     Minutes per session: Not on file     Stress: Not on file   Relationships     Social connections     Talks on phone: Not on file     Gets together: Not on file     Attends Druze service: Not on file     Active member of club or organization: Not on file     Attends meetings of clubs or organizations: Not on file     Relationship status: Not on file     Intimate partner violence     Fear of current or ex partner: Not on file     Emotionally abused: Not on file     Physically abused: Not on file     Forced sexual activity: Not on file   Other Topics Concern     Parent/sibling w/ CABG, MI or angioplasty before 65F 55M? Not Asked   Social History Narrative     Not on file     Past Medical History:   Diagnosis Date     Chronic bronchitis (H) 2015     Contact dermatitis      Depressive disorder 1985     Eczema     HANDS     Elevated liver enzymes      H/O total knee replacement, left      History of total hip replacement 10/27/2011     Hyperlipidemia      Hypertension      Morbid obesity (H)      Osteoarthrosis     right knee     Sleep apnea      Tobacco use disorder      Past Surgical History:   Procedure Laterality Date     ARTHROPLASTY KNEE   6/14/2012    Procedure: ARTHROPLASTY KNEE;  Left Total Knee Arthroplasty;  Surgeon: Annette Quesada MD;  [facility]     ARTHROSCOPY HIP Right      BRONCHOSCOPY FLEXIBLE AND RIGID N/A 9/17/2019    Procedure: Bronchoscopy flexible;  Surgeon: Patrick Ryao MD;  Location: [facility] OR     C TOTAL HIP ARTHROPLASTY  07/09/04    RT     CV EXTRACORPERAL MEMBRANE OXYGENATION N/A 9/9/2019    Procedure: Extracorporeal Membrance Oxygenation;  Surgeon: Kaleb Mcfarlane MD;  Location:  HEART CARDIAC CATH LAB     INSERT EXTRACORPORAL MEMBRANE OXYGENATOR N/A 9/17/2019    Procedure: Venous-Venous cannulation using ultrasound guidance and transesophageal echocardiogram, venous-arterial ecmo decannulation and repair of left femoral artery;  Surgeon: Patrick Rayo MD;  [facility]: [facility] OR     IR MECH THROMB PRIM ART, NON-INTRACRNL  9/10/2019     IR PULMONARY ANGIOGRAM BILATERAL  9/10/2019     IR THROMBOLYSIS ARTERIAL INFUSION INITIAL DAY  9/10/2019     THORACENTESIS N/A 1/16/2020    Procedure: THORACENTESIS;  Surgeon: Georgina Richardson MD;  [facility]: [facility] GI     Family History   Problem Relation Age of Onset     Thyroid Disease Mother      Hypertension Mother      Skin Cancer Mother         MMohs surgery with skin graft     Cerebrovascular Disease Mother         CVA after endarderectomy     Diabetes Mother      Breast Cancer Paternal Grandmother      Pancreatic Cancer Paternal Grandmother      Cardiovascular Paternal Aunt      Cardiovascular Paternal Uncle      Depression Other      Alcoholism Father      Thyroid Disease Sister      Alcoholism Sister      Hypertension Maternal Grandmother      Heart Disease Sister         multiple ablations     Alcoholism Sister      Prostate Cancer Paternal Grandfather      ROS Pulmonary  Dyspnea: Yes, Cough: No, Chest pain: No, Wheezing: No, Sputum Production: No, Hemoptysis: No  A complete ROS was otherwise negative except as noted in the HPI.  There were no vitals taken for  this visit.  Exam:   PSYCH: mentation appears normal. tearful  Results:  Echo 12/23/19 normal LVEF and RV  6MWT 9/4/20 shows desaturation without hypoxemia on room air  PFTs 9/4/20 show mild restriction, normal diffusion.   CT chest 9/4 shows persistent bilateral groundglass opacities, left pleural effusion significant decline from prior testing.    Assessment and plan: Shira is a 59 yo female who is being seen in follow up. She was previously seen for nicotine dependence, dyspnea, obesity.  About 3 months ago, she had a cardiac arrest and was hospitalized.  She required ECMO therapy and the cardiac arrest was attributed to bilateral pulmonary emboli.  She has been out of the hospital and acute rehab since October, has been incredibly short of breath since discharge.  She has lost some weight, she is about 50+ pounds down from her peak weight.  She has also quit smoking.    Dyspnea, hypoxic respiratory failure  - resolved, likely related to PE and illness. PFTs nearly normal now.    - pleural effusion -  Inflammatory, recurrent and small. No additional evaluation warranted    Nicotine dependence-we discussed for 3 minutes today.  She has quit but still has cravings.  Shira remains committed to abstinence, but she is not living with her mom anymore, who does still smoke.    Depression - she is working with mental health professional and planning to discuss mood and fatigue    Fatigue - I encouraged her to discuss with rheumatology and reconsider bariatric surgery. She was evaluated a few years ago but wasn't ready to consider, partly because she would have needed to quit smoking. We discussed that fatigue is likely related to at least partially her excess weight.     Coronary artery calcification - she is on a statin, hasn't had cholesterol checked in 18 months. She says statin was started in the hospital, so someone should be monitoring. I will discuss with Dr Busby    Follow up as needed

## 2020-09-08 NOTE — LETTER
"    9/8/2020         RE: Shira Rodriguez  [address]  [place] [place] [place] [address]        Dear Colleague,    Thank you for referring your patient, Shira Rodriguez, to the [facility]. Please see a copy of my visit note below.    Shira Rodriguez is a 60 year old female who is being evaluated via a billable video visit.      The patient has been notified of following:     \"This video visit will be conducted via a call between you and your physician/provider. We have found that certain health care needs can be provided without the need for an in-person physical exam.  This service lets us provide the care you need with a video conversation.  If a prescription is necessary we can send it directly to your pharmacy.  If lab work is needed we can place an order for that and you can then stop by our lab to have the test done at a later time.    Video visits are billed at different rates depending on your insurance coverage.  Please reach out to your insurance provider with any questions.    If during the course of the call the physician/provider feels a video visit is not appropriate, you will not be charged for this service.\"    Patient has given verbal consent for Video visit? Yes  How would you like to obtain your AVS? MyChart  If you are dropped from the video visit, the video invite should be resent to: Text to cell phone: [phone]  Will anyone else be joining your video visit? No        Video-Visit Details    Type of service:  Video Visit    Start: 09/08/2020 03:56 pm   Stop: 09/08/2020 04:33 pm    Originating Location (pt. Location): Home    Distant Location (provider location):  [facility]     Platform used for Video Visit: Tu Richardson MD      [facility]mon[facility] Follow Up Visit    Reason for Visit  Shira Rodriguez is a 59 year old female who is followed for dyspnea, obesity, nicotine " "dependence   Pulmonary HPI  She continues to have a productive cough, improved from when she was smoking, but she quit about a year ago when she had PE with cardiac arrest. She is not using CPAP at all now, has been unable to keep it on, takes it off during sleep. She is planning to have a repeat sleep study soon. After rehab she was able to stop supplemental oxygen, didn't need it any more. She is not working right now, quit summer 2019 because of fatigue and to focus on health. For the cough, she took dextromethorphan without relief. She has no inhaled medications.     She was feeling badly through the summer worsening dyspnea, was very sedentary prior to diagnosis with PE.  She has CPAP but not doing at all right now, had trouble keeping it on. She was advised to use Ambien    She has RA on methotrexate has been on about 2 years.     The patient was seen and examined by Georgina Richardson MD   Current Outpatient Medications   Medication     acetaminophen (TYLENOL) 325 MG tablet     ARIPiprazole (ABILIFY) 5 MG tablet     atorvastatin (LIPITOR) 40 MG tablet     DULoxetine (CYMBALTA) 60 MG capsule     folic acid 1 MG PO tablet     insulin syringe-needle U-100 (BD INSULIN SYRINGE ULTRAFINE) 30G X 1/2\" 1 ML miscellaneous     leucovorin 5 MG PO tablet     lisinopril (PRINIVIL/ZESTRIL) 5 MG tablet     methotrexate 50 MG/2ML IJ injection     nystatin (MYCOSTATIN) 669386 UNIT/GM external powder     order for DME     warfarin ANTICOAGULANT (COUMADIN) 5 MG tablet     furosemide (LASIX) 40 MG tablet     potassium chloride ER (MICRO-K) 10 MEQ CR capsule     zolpidem ER (AMBIEN CR) 6.25 MG CR tablet     Current Facility-Administered Medications   Medication     lidocaine (PF) (XYLOCAINE) 1 % injection 4 mL     triamcinolone (KENALOG-40) injection 40 mg     triamcinolone (KENALOG-40) injection 40 mg     Facility-Administered Medications Ordered in Other Visits   Medication     sodium chloride (PF) 0.9% PF flush 10 mL "     Allergies   Allergen Reactions     Adhesive Tape Blisters     Erythromycin Rash     Social History     Socioeconomic History     Marital status: Single     Spouse name: Not on file     Number of children: Not on file     Years of education: Not on file     Highest education level: Not on file   Occupational History     Occupation: registered nurse     Comment: [employer]   Social Needs     Financial resource strain: Not on file     Food insecurity     Worry: Not on file     Inability: Not on file     Transportation needs     Medical: Not on file     Non-medical: Not on file   Tobacco Use     Smoking status: Former Smoker     Packs/day: 1.00     Years: 33.00     Pack years: 33.00     Types: Cigarettes     Start date: 1982     Last attempt to quit: 2019     Years since quittin.0     Smokeless tobacco: Never Used   Substance and Sexual Activity     Alcohol use: Yes     Alcohol/week: 0.0 standard drinks     Comment: occassional     Drug use: No     Sexual activity: Never   Lifestyle     Physical activity     Days per week: Not on file     Minutes per session: Not on file     Stress: Not on file   Relationships     Social connections     Talks on phone: Not on file     Gets together: Not on file     Attends Shinto service: Not on file     Active member of club or organization: Not on file     Attends meetings of clubs or organizations: Not on file     Relationship status: Not on file     Intimate partner violence     Fear of current or ex partner: Not on file     Emotionally abused: Not on file     Physically abused: Not on file     Forced sexual activity: Not on file   Other Topics Concern     Parent/sibling w/ CABG, MI or angioplasty before 65F 55M? Not Asked   Social History Narrative     Not on file     Past Medical History:   Diagnosis Date     Chronic bronchitis (H) 2015     Contact dermatitis      Depressive disorder      Eczema     HANDS     Elevated liver enzymes      H/O total  knee replacement, left      History of total hip replacement 10/27/2011     Hyperlipidemia      Hypertension      Morbid obesity (H)      Osteoarthrosis     right knee     Sleep apnea      Tobacco use disorder      Past Surgical History:   Procedure Laterality Date     ARTHROPLASTY KNEE  6/14/2012    Procedure: ARTHROPLASTY KNEE;  Left Total Knee Arthroplasty;  Surgeon: Annette Quesada MD;  [facility]     ARTHROSCOPY HIP Right      BRONCHOSCOPY FLEXIBLE AND RIGID N/A 9/17/2019    Procedure: Bronchoscopy flexible;  Surgeon: Patrick Rayo MD;  [facility]     C TOTAL HIP ARTHROPLASTY  07/09/04    RT     CV EXTRACORPERAL MEMBRANE OXYGENATION N/A 9/9/2019    Procedure: Extracorporeal Membrance Oxygenation;  Surgeon: Kaleb Mcfarlane MD;  [facility]     INSERT EXTRACORPORAL MEMBRANE OXYGENATOR N/A 9/17/2019    Procedure: Venous-Venous cannulation using ultrasound guidance and transesophageal echocardiogram, venous-arterial ecmo decannulation and repair of left femoral artery;  Surgeon: Patrick Rayo MD;  [facility] OR     IR MECH THROMB PRIM ART, NON-INTRACRNL  9/10/2019     IR PULMONARY ANGIOGRAM BILATERAL  9/10/2019     IR THROMBOLYSIS ARTERIAL INFUSION INITIAL DAY  9/10/2019     THORACENTESIS N/A 1/16/2020    Procedure: THORACENTESIS;  Surgeon: Georgina Richardson MD;  [facility]: [facility] GI     Family History   Problem Relation Age of Onset     Thyroid Disease Mother      Hypertension Mother      Skin Cancer Mother         MMohs surgery with skin graft     Cerebrovascular Disease Mother         CVA after endarderectomy     Diabetes Mother      Breast Cancer Paternal Grandmother      Pancreatic Cancer Paternal Grandmother      Cardiovascular Paternal Aunt      Cardiovascular Paternal Uncle      Depression Other      Alcoholism Father      Thyroid Disease Sister      Alcoholism Sister      Hypertension Maternal Grandmother      Heart Disease Sister         multiple  ablations     Alcoholism Sister      Prostate Cancer Paternal Grandfather      ROS Pulmonary  Dyspnea: Yes, Cough: No, Chest pain: No, Wheezing: No, Sputum Production: No, Hemoptysis: No  A complete ROS was otherwise negative except as noted in the HPI.  There were no vitals taken for this visit.  Exam:   PSYCH: mentation appears normal. tearful  Results:  Echo 12/23/19 normal LVEF and RV  6MWT 9/4/20 shows desaturation without hypoxemia on room air  PFTs 9/4/20 show mild restriction, normal diffusion.   CT chest 9/4 shows persistent bilateral groundglass opacities, left pleural effusion significant decline from prior testing.    Assessment and plan: Shira is a 59 yo female who is being seen in follow up. She was previously seen for nicotine dependence, dyspnea, obesity.  About 3 months ago, she had a cardiac arrest and was hospitalized.  She required ECMO therapy and the cardiac arrest was attributed to bilateral pulmonary emboli.  She has been out of the hospital and acute rehab since October, has been incredibly short of breath since discharge.  She has lost some weight, she is about 50+ pounds down from her peak weight.  She has also quit smoking.    Dyspnea, hypoxic respiratory failure  - resolved, likely related to PE and illness. PFTs nearly normal now.    - pleural effusion -  Inflammatory, recurrent and small. No additional evaluation warranted    Nicotine dependence-we discussed for 3 minutes today.  She has quit but still has cravings.  Shira remains committed to abstinence, but she is not living with her mom anymore, who does still smoke.    Depression - she is working with mental health professional and planning to discuss mood and fatigue    Fatigue - I encouraged her to discuss with rheumatology and reconsider bariatric surgery. She was evaluated a few years ago but wasn't ready to consider, partly because she would have needed to quit smoking. We discussed that fatigue is likely related to at least  partially her excess weight.     Coronary artery calcification - she is on a statin, hasn't had cholesterol checked in 18 months. She says statin was started in the hospital, so someone should be monitoring. I will discuss with Dr Busby    Follow up as needed            Again, thank you for allowing me to participate in the care of your patient.        Sincerely,        Georgina Richardson MD

## 2020-09-10 ENCOUNTER — MYC MEDICAL ADVICE (OUTPATIENT)
Dept: INTERNAL MEDICINE | Facility: CLINIC | Age: 60
End: 2020-09-10

## 2020-09-10 NOTE — TELEPHONE ENCOUNTER
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     I did order a CT showing moderate to severe coronary artery calcification. You may know there is good evidence to support evaluating and reporting coronary artery calcium on other chest computed tomography and that this corresponds to the calcium score available on a dedicated cardiac CT (angio or calcium study). There is even a scoring system (CAC-DRS) with guidelines for recommended management    References:   J Cardiovasc Comput Tomogr. 2018 May - Jun;12(3):185-191.   Radiology Volume 276, Issue 1, 1 July 2015, Pages 82-90    For Shira, she tells me she was started on a statin in the hospital but doesn't seem like anyone is monitoring her cholesterol. It seems like she is modifying risk so probably nothing else to do but wanted to bring to your attention.     Sincerely,  Georgina

## 2020-09-15 ENCOUNTER — VIRTUAL VISIT (OUTPATIENT)
Dept: RHEUMATOLOGY | Facility: CLINIC | Age: 60
End: 2020-09-15
Attending: INTERNAL MEDICINE
Payer: COMMERCIAL

## 2020-09-15 VITALS — BODY MASS INDEX: 47.09 KG/M2 | HEIGHT: 66 IN | WEIGHT: 293 LBS

## 2020-09-15 DIAGNOSIS — T45.1X5A ADVERSE EFFECT OF METHOTREXATE, INITIAL ENCOUNTER: ICD-10-CM

## 2020-09-15 DIAGNOSIS — M05.742 RHEUMATOID ARTHRITIS INVOLVING BOTH HANDS WITH POSITIVE RHEUMATOID FACTOR (H): ICD-10-CM

## 2020-09-15 DIAGNOSIS — Z79.899 HIGH RISK MEDICATION USE: ICD-10-CM

## 2020-09-15 DIAGNOSIS — M05.79 RHEUMATOID ARTHRITIS INVOLVING MULTIPLE SITES WITH POSITIVE RHEUMATOID FACTOR (H): ICD-10-CM

## 2020-09-15 DIAGNOSIS — M05.741 RHEUMATOID ARTHRITIS INVOLVING BOTH HANDS WITH POSITIVE RHEUMATOID FACTOR (H): ICD-10-CM

## 2020-09-15 RX ORDER — METHOTREXATE 25 MG/ML
25 INJECTION, SOLUTION INTRA-ARTERIAL; INTRAMUSCULAR; INTRAVENOUS
Qty: 12 ML | Refills: 4 | Status: SHIPPED | OUTPATIENT
Start: 2020-09-15 | End: 2021-02-24

## 2020-09-15 RX ORDER — FOLIC ACID 1 MG/1
3-4 TABLET ORAL DAILY
Qty: 120 TABLET | Refills: 7 | Status: SHIPPED | OUTPATIENT
Start: 2020-09-15 | End: 2021-02-24

## 2020-09-15 RX ORDER — LEUCOVORIN CALCIUM 5 MG/1
5 TABLET ORAL
Qty: 4 TABLET | Refills: 5 | Status: SHIPPED | OUTPATIENT
Start: 2020-09-15 | End: 2021-05-18

## 2020-09-15 ASSESSMENT — PATIENT HEALTH QUESTIONNAIRE - PHQ9: SUM OF ALL RESPONSES TO PHQ QUESTIONS 1-9: 17

## 2020-09-15 ASSESSMENT — MIFFLIN-ST. JEOR: SCORE: 2093.16

## 2020-09-15 NOTE — PROGRESS NOTES
[facility]  Rheumatology Clinic-remote  Everett Austin MD  09/15/2020     Name: Shira Rodriguez  MRN: [id number]  Age: 59 year old  : 1960  Referring provider: Anjel Busby     Assessment and Plan:  # Inflammatory Arthritis (+RF/+CCP, dx 2017 with diffuse polyarticular inflammatory flare):   Ms. Rodriguez has few joint complaints with only mild involvement of the bilateral 2nd digits and minimal morning stiffness. No further oral ulcers since adding leucovorin.  Brief video examination today shows no altered range of motion or synovitis in upper extremity joints.  Blood work on 2020 showed creatinine, transaminases, and CBC all within normal limits.    Seropositive inflammatory arthritis is well controlled.  I recommend that she continue her current immunomodulating regimen (methotrexate 25 mg subcutaneous, leucovorin 5 mg one day after methotrexate, and folate 4 mg daily). It is very unlikely that low grade persistent cough is secondary to a hypersensitivity or fibrotic response to methotrexate.     # Fatigue: The cause of this constitutional symptom is not clear.  Differential diagnosis includes active inflammatory arthritis- but there are no joint specific symptoms that suggest systemic inflammatory disease--metabolic disorder such as thyroid dysfunction, diabetes; anemia; medications; and functional disorder.  Patient describes depression with active mood disorder.  She is working with psychiatry to adjust psychotropic medications in the last several weeks, and is engaged in weekly talk  therapy.  Recent evaluation through primary care has not revealed evidence of nonpsychiatric causes of severe fatigue.  It is reasonable to repeat inflammatory markers, but I doubt that autoimmune arthritis is the single or even a major factor in the patient's ongoing fatigue.  I urged patient to follow-up with sleep medicine to repeat sleep study and determine whether frequent arousals could be  "resulting in severe daytime fatigue.    Everett Austin M.D.  Staff Rheumatologist, [facility]  Pager [phone]     Follow-up: Follow up with lab monitoring in 3 months, and in clinic in 6 months.     HPI:   Shira Rodriguez is a 59 year old female with a history of rheumatoid arthritis who presents for follow up. I last saw the patient on 2-. Seropositive rheumatoid arthritis was well controlled at that time on 25 mg of subcutaneous Methotrexate. She did report oral lesions which may be related to Methotrexate. She was also encouraged to trial leucovorin 5 mg to relieve any mucosal irritation related to Methotrexate.    Interval history 09-:    \"Unbelievable\" fatigue all the time. She sits in her chair \"all day long\" diue to low energy. She does note poor sleep with frequent awakening. She has been trying to use CPAP, but it has not been tolerated. She is awaiting a chance to do another sleep study (after COVID19 abates).  She is tearful today describing her sense of growing frustration at lack of return of her former vigor and energy following recovery from critical illness exactly 1 year ago.    Her joints are doing well. Her wrists hurt in the mornings briefly. Pain improves with a few minutes of moving her wrists. Her finger joints hurt with fist formation. No pain with use of the hands.  No visible swelling, no difficulty with activities of daily living involving the hands.    Low back and R knee pain, chronic, are unchanged., improved.    She has a chronic low-grade cough. She is no longer smoking at all.    She has lost over 70 lb since 1 year ago, when she was hospitalized with severe pulmonary embolism, and required prolonged ICU treatment with ECMO.    Prior history  2-2020    Today, the patient reports that she has been doing well since her last visit. She states that her oral lesions resolved with  leucovorin and folic acid. She also notes that her joint pain and decreased range of motion " is limited to her bilateral second digits in her hands. Her left second finger is more stiff than her right, and she denies locking or ratcheting in either finger. She notes that her morning stiffness typically resolves in 15 to 30 minutes. She has no significant swelling in her feet. She notes that she has very little swelling in her legs after starting lasix. She denies rash, shortness of breath, or raynaud's flares.     The patient states that she had a cough prior to her pulmonary embolism/myocardial infarction. She then developed pneumonia while intubated and required a thoracentesis while in the hospital. She reports that she had a persistent cough after this procedure and repeat ultrasound noted further fluid for which she had another thoracentesis which produced 600 ml of fluid. She notes that her cough is improved after her second thoracentesis but is still present. She states her pulmonologist is concerned her remaining cough could be related to her Methotrexate. She is set up for a repeat PFTs and Chest Xray in the coming months.      Rheumatological history:  Referrred 2/2017 for 3 months of BL hand pain thought consistent with CTS and positive RF/CCP. EMG showed moderate median neuropathy on L and severe on R, MRI c-spine normal. She received R CT injection by [employer] with 9 months of relief. She did describe intermittent episodes of swelling of her feet/ankles around this time period as well that resolved with medrol dose pack. She was re-evaluated 8/2017 after developing polyarticular inflammatory arthritis of the right knee, ankles, wrists, hands, and shoulders. She was started on MTX, given depomedrol IM injection, oral prednisone, and given R knee CS injection. MTX increased and oral prednisone tapered to off in 9/2017. Had repeat R CT injection in 10/2017 that lasted until 1/2018. R knee significantly improved following CS injection 12/2017. Has persistent pain in L heel with enthesopathy on  "imaging, unclear if had true enthesitis in this region due to her RA. Participated in pool therapy winter 2018 with good response. At her 3/2018 OV she continued to have significant pain in her R wrist thought due to CT, but given some ongoing persistent EMS (BL hand stiffness lasting ALL day) as well and her known seropositive disease I recommended adding Humira to her regimen. This was ordered but she never started. Methotrexate Rx continued through 2010.     Review of Systems:   Pertinent items are noted in HPI or as below, remainder of complete ROS is negative.    No fevers/chills/sweats  No recent problems with hearing or vision. No swallowing problems.   No breathing difficulty, shortness of breath, coughing, or wheezing  No chest pain or palpitations  No heart burn, indigestion, abdominal pain, nausea, vomiting, diarrhea  No urination problems, no bloody, cloudy urine, no dysuria  No numbing, tingling, weakness  No headaches or confusion  No rashes. No easy bleeding or bruising.       Active Medications:     Current Outpatient Medications:      acetaminophen (TYLENOL) 325 MG tablet, Take 2 tablets (650 mg) by mouth every 6 hours as needed for mild pain or fever, Disp: , Rfl:      ARIPiprazole (ABILIFY) 5 MG tablet, Take 1 tablet (5 mg) by mouth daily, Disp: 90 tablet, Rfl: 3     atorvastatin (LIPITOR) 40 MG tablet, Take 1 tablet (40 mg) by mouth every evening, Disp: 90 tablet, Rfl: 3     DULoxetine (CYMBALTA) 60 MG capsule, Take 1 capsule (60 mg) by mouth daily, Disp: 30 capsule, Rfl: 0     folic acid 1 MG PO tablet, Take 3-4 tablets (3-4 mg) by mouth daily, Disp: 120 tablet, Rfl: 7     furosemide (LASIX) 40 MG tablet, Take 2 tablets (80 mg) by mouth daily (Patient not taking: Reported on 9/8/2020), Disp: 180 tablet, Rfl: 1     insulin syringe-needle U-100 (BD INSULIN SYRINGE ULTRAFINE) 30G X 1/2\" 1 ML miscellaneous, For Methotrexate use weekly., Disp: 12 each, Rfl: 3     leucovorin 5 MG PO tablet, Take 1 " tablet (5 mg) by mouth every 7 days *take 24h after taking weekly methotrexate dose, Disp: 4 tablet, Rfl: 3     lisinopril (PRINIVIL/ZESTRIL) 5 MG tablet, Take 1 tablet (5 mg) by mouth daily, Disp: 90 tablet, Rfl: 3     methotrexate 50 MG/2ML IJ injection, Inject 1 mL (25 mg) Subcutaneous every 7 days, Disp: 12 mL, Rfl: 4     nystatin (MYCOSTATIN) 697125 UNIT/GM external powder, Apply to coat rash tid, neck and breast area rashes, Disp: 60 g, Rfl: 3     order for DME, Please discontinue all oxygen and collect equipment., Disp: 1 Units, Rfl: 0     potassium chloride ER (MICRO-K) 10 MEQ CR capsule, Take 1 capsule (10 mEq) by mouth 2 times daily, Disp: 180 capsule, Rfl: 3     warfarin ANTICOAGULANT (COUMADIN) 5 MG tablet, TAKE 1.5-2 TABLETS DAILY OR AS DIRECTED BY COUMADIN CLINIC., Disp: 90 tablet, Rfl: 5     zolpidem ER (AMBIEN CR) 6.25 MG CR tablet, Take one tab the night of your sleep study at bedtime in sleep center. (Patient not taking: Reported on 9/8/2020), Disp: 1 tablet, Rfl: 0    Current Facility-Administered Medications:      lidocaine (PF) (XYLOCAINE) 1 % injection 4 mL, 4 mL, , , Ken Modi MD, 4 mL at 12/31/19 1030     triamcinolone (KENALOG-40) injection 40 mg, 40 mg, , , Tashi Saxena MD, 40 mg at 05/04/20 0813     triamcinolone (KENALOG-40) injection 40 mg, 40 mg, , , Kne Modi MD, 40 mg at 12/31/19 1030    Facility-Administered Medications Ordered in Other Visits:      sodium chloride (PF) 0.9% PF flush 10 mL, 10 mL, Intracatheter, Once, Loy Joshi APRN CNP      Allergies:   Adhesive tape and Erythromycin      Past Medical History:  Depression   Eczema   Hyperlipidemia   Hypertension   Morbid obesity   Osteoarthrosis right knee   Sleep apnea   Tobacco use disorder   Primary localized osteoarthrosis, pelvic region and thigh  Degeneration of cervical intervertebral disc  Chronic bronchitis   Pulmonary embolism   Cardiac arrest; required ECMO 9-2019     Past Surgical  "History:  Left total knee arthoplasty 6/14/2012   Right hip arthroplasty 07/09/2004      Family History:   Mother: thyroid disease, hypertension, skin cancer, CVA  Paternal grandmother: breast cancer, pancreatic cancer   Father: alcoholism   Sister: thyroid disease, alcoholism   Maternal grandmother: hypertension   Sister: heart disease, multiple ablations   Paternal grandfather: prostate cancer      Social History:   Current everyday cigarette smoker, 1 pack/day, 33 years, started 1982  No smokeless tobacco use  Occasional alcohol use   Physical Exam:   Ht 1.676 m (5' 6\")   Wt (!) 150.1 kg (331 lb)   Breastfeeding No   BMI 53.42 kg/m     Wt Readings from Last 4 Encounters:   09/15/20 (!) 150.1 kg (331 lb)   08/03/20 (!) 163.3 kg (360 lb)   06/12/20 (!) 155.5 kg (342 lb 14.4 oz)   05/04/20 (!) 165.6 kg (365 lb)     Constitutional: Well-developed, appearing stated age; cooperative  Eyes: Normal EOM, PERRLA, vision, conjunctiva, sclera  ENT: Normal external ears, nose, hearing, lips, teeth, gums, throat. No mucous membrane lesions, normal saliva pool  Neck: No mass or thyroid enlargement  MS: Excellent alignment of MCPs and PIPs. No appearance of dactylitis at either second finger.  Skin: No nail pitting, alopecia, nodules or lesions.    Neuro: Normal cranial nerves  Psych: Normal judgement, orientation, memory, affect.     Laboratory:   RHEUM RESULTS Latest Ref Rng & Units 4/15/2020 6/12/2020 9/4/2020   SED RATE 0 - 30 mm/h - 13 -   CRP, INFLAMMATION 0.0 - 8.0 mg/L - - -   CK TOTAL 30 - 225 U/L - 31 -   RHEUMATOID FACTOR <20 IU/mL - - -   EMERY SCREEN BY EIA <1.0 - - -   AST 0 - 45 U/L 14 14 19   ALT 0 - 50 U/L 22 25 26   ALBUMIN 3.4 - 5.0 g/dL - 3.4 -   WBC 4.0 - 11.0 10e9/L 7.0 7.3 8.8   RBC 3.8 - 5.2 10e12/L 5.11 5.09 5.23(H)   HGB 11.7 - 15.7 g/dL 15.2 15.3 15.8(H)   HCT 35.0 - 47.0 % 46.8 47.9(H) 48.5(H)   MCV 78 - 100 fl 92 94 93   MCHC 31.5 - 36.5 g/dL 32.5 31.9 32.6   RDW 10.0 - 15.0 % 16.2(H) 15.1(H) 14.7 " "   - 450 10e9/L 302 283 313   CREATININE 0.52 - 1.04 mg/dL 0.70 0.72 0.76   GFR ESTIMATE, IF BLACK >60 mL/min/[1.73:m2] >90 >90 >90   GFR ESTIMATE >60 mL/min/[1.73:m2] >90 >90 86    - 1,620 mg/dL - - -   IGA 70 - 380 mg/dL - - -   IGM 60 - 265 mg/dL - - -   HEPATITIS C ANTIBODY NR - - -       Rheumatoid Factor   Date Value Ref Range Status   08/28/2017 51 (H) <20 IU/mL Final     Cyclic Citrullinated Peptide Antibody, IgG   Date Value Ref Range Status   08/28/2017 15 (H) <7 U/mL Final     Comment:     Positive     EMERY Screen by EIA   Date Value Ref Range Status   12/30/2016 <1.0  Interpretation:  Negative   <1.0 Final     Hepatitis B Core Caron   Date Value Ref Range Status   04/12/2017 Nonreactive NR Final     Hep B Surface Agn   Date Value Ref Range Status   04/12/2017 Nonreactive NR Final     IGG   Date Value Ref Range Status   04/21/2017 940 695 - 1,620 mg/dL Final     IGA   Date Value Ref Range Status   04/21/2017 271 70 - 380 mg/dL Final     IGM   Date Value Ref Range Status   04/21/2017 395 (H) 60 - 265 mg/dL Final     Chief Complaint   Patient presents with     Follow Up     4 months     Height 1.676 m (5' 6\"), weight (!) 150.1 kg (331 lb), not currently breastfeeding.    Shira Rodriguez is a 60 year old female who is being evaluated via a billable video visit.      The patient has been notified of following:     \"This video visit will be conducted via a call between you and your physician/provider. We have found that certain health care needs can be provided without the need for an in-person physical exam.  This service lets us provide the care you need with a video conversation.  If a prescription is necessary we can send it directly to your pharmacy.  If lab work is needed we can place an order for that and you can then stop by our lab to have the test done at a later time.    Video visits are billed at different rates depending on your insurance coverage.  Please reach out to your insurance " "provider with any questions.    If during the course of the call the physician/provider feels a video visit is not appropriate, you will not be charged for this service.\"    Patient has given verbal consent for Video visit? Yes  How would you like to obtain your AVS? MyChart  If you are dropped from the video visit, the video invite should be resent to: Other e-mail: [email or web address] IN THE WAITING ROOM  Will anyone else be joining your video visit? No        Video-Visit Details    Type of service:  Video Visit    Video Start Time: 3:12 PM  Video End Time: 3:45 PM    Originating Location (pt. Location): Home    Distant Location (provider location):  [facility] RHEUMATOLOGY     Platform used for Video Visit: Tu Austin MD        "

## 2020-09-15 NOTE — LETTER
9/15/2020       RE: Shira Rodriguez  [address]  [facility] [address]     Dear Colleague,    Thank you for referring your patient, Shira Rodriguez, to the [facility] RHEUMATOLOGY at [facility]. Please see a copy of my visit note below.    [facility]  Rheumatology Clinic-remote  Everett Austin MD  09/15/2020     Name: Shira Rodriguez  MRN: [id number]  Age: 59 year old  : 1960  Referring provider: Anjel Busby     Assessment and Plan:  # Inflammatory Arthritis (+RF/+CCP, dx 2017 with diffuse polyarticular inflammatory flare):   Ms. Rodriguez has few joint complaints with only mild involvement of the bilateral 2nd digits and minimal morning stiffness. No further oral ulcers since adding leucovorin.  Brief video examination today shows no altered range of motion or synovitis in upper extremity joints.  Blood work on 2020 showed creatinine, transaminases, and CBC all within normal limits.    Seropositive inflammatory arthritis is well controlled.  I recommend that she continue her current immunomodulating regimen (methotrexate 25 mg subcutaneous, leucovorin 5 mg one day after methotrexate, and folate 4 mg daily). It is very unlikely that low grade persistent cough is secondary to a hypersensitivity or fibrotic response to methotrexate.     # Fatigue: The cause of this constitutional symptom is not clear.  Differential diagnosis includes active inflammatory arthritis- but there are no joint specific symptoms that suggest systemic inflammatory disease--metabolic disorder such as thyroid dysfunction, diabetes; anemia; medications; and functional disorder.  Patient describes depression with active mood disorder.  She is working with psychiatry to adjust psychotropic medications in the last several weeks, and is engaged in weekly talk  therapy.  Recent evaluation through primary care has not revealed evidence of nonpsychiatric causes of  "severe fatigue.  It is reasonable to repeat inflammatory markers, but I doubt that autoimmune arthritis is the single or even a major factor in the patient's ongoing fatigue.  I urged patient to follow-up with sleep medicine to repeat sleep study and determine whether frequent arousals could be resulting in severe daytime fatigue.    Everett Austin M.D.  Staff Rheumatologist,  Health  Pager [phone]     Follow-up: Follow up with lab monitoring in 3 months, and in clinic in 6 months.     HPI:   Shira Rodriguez is a 59 year old female with a history of rheumatoid arthritis who presents for follow up. I last saw the patient on 2-. Seropositive rheumatoid arthritis was well controlled at that time on 25 mg of subcutaneous Methotrexate. She did report oral lesions which may be related to Methotrexate. She was also encouraged to trial leucovorin 5 mg to relieve any mucosal irritation related to Methotrexate.    Interval history 09-:    \"Unbelievable\" fatigue all the time. She sits in her chair \"all day long\" diue to low energy. She does note poor sleep with frequent awakening. She has been trying to use CPAP, but it has not been tolerated. She is awaiting a chance to do another sleep study (after COVID19 abates).  She is tearful today describing her sense of growing frustration at lack of return of her former vigor and energy following recovery from critical illness exactly 1 year ago.    Her joints are doing well. Her wrists hurt in the mornings briefly. Pain improves with a few minutes of moving her wrists. Her finger joints hurt with fist formation. No pain with use of the hands.  No visible swelling, no difficulty with activities of daily living involving the hands.    Low back and R knee pain, chronic, are unchanged., improved.    She has a chronic low-grade cough. She is no longer smoking at all.    She has lost over 70 lb since 1 year ago, when she was hospitalized with severe pulmonary embolism, " and required prolonged ICU treatment with ECMO.    Prior history  2-2020    Today, the patient reports that she has been doing well since her last visit. She states that her oral lesions resolved with  leucovorin and folic acid. She also notes that her joint pain and decreased range of motion is limited to her bilateral second digits in her hands. Her left second finger is more stiff than her right, and she denies locking or ratcheting in either finger. She notes that her morning stiffness typically resolves in 15 to 30 minutes. She has no significant swelling in her feet. She notes that she has very little swelling in her legs after starting lasix. She denies rash, shortness of breath, or raynaud's flares.     The patient states that she had a cough prior to her pulmonary embolism/myocardial infarction. She then developed pneumonia while intubated and required a thoracentesis while in the hospital. She reports that she had a persistent cough after this procedure and repeat ultrasound noted further fluid for which she had another thoracentesis which produced 600 ml of fluid. She notes that her cough is improved after her second thoracentesis but is still present. She states her pulmonologist is concerned her remaining cough could be related to her Methotrexate. She is set up for a repeat PFTs and Chest Xray in the coming months.      Rheumatological history:  Referrred 2/2017 for 3 months of BL hand pain thought consistent with CTS and positive RF/CCP. EMG showed moderate median neuropathy on L and severe on R, MRI c-spine normal. She received R CT injection by sports med with 9 months of relief. She did describe intermittent episodes of swelling of her feet/ankles around this time period as well that resolved with medrol dose pack. She was re-evaluated 8/2017 after developing polyarticular inflammatory arthritis of the right knee, ankles, wrists, hands, and shoulders. She was started on MTX, given depomedrol IM  injection, oral prednisone, and given R knee CS injection. MTX increased and oral prednisone tapered to off in 9/2017. Had repeat R CT injection in 10/2017 that lasted until 1/2018. R knee significantly improved following CS injection 12/2017. Has persistent pain in L heel with enthesopathy on imaging, unclear if had true enthesitis in this region due to her RA. Participated in pool therapy winter 2018 with good response. At her 3/2018 OV she continued to have significant pain in her R wrist thought due to CT, but given some ongoing persistent EMS (BL hand stiffness lasting ALL day) as well and her known seropositive disease I recommended adding Humira to her regimen. This was ordered but she never started. Methotrexate Rx continued through 2010.     Review of Systems:   Pertinent items are noted in HPI or as below, remainder of complete ROS is negative.    No fevers/chills/sweats  No recent problems with hearing or vision. No swallowing problems.   No breathing difficulty, shortness of breath, coughing, or wheezing  No chest pain or palpitations  No heart burn, indigestion, abdominal pain, nausea, vomiting, diarrhea  No urination problems, no bloody, cloudy urine, no dysuria  No numbing, tingling, weakness  No headaches or confusion  No rashes. No easy bleeding or bruising.       Active Medications:     Current Outpatient Medications:      acetaminophen (TYLENOL) 325 MG tablet, Take 2 tablets (650 mg) by mouth every 6 hours as needed for mild pain or fever, Disp: , Rfl:      ARIPiprazole (ABILIFY) 5 MG tablet, Take 1 tablet (5 mg) by mouth daily, Disp: 90 tablet, Rfl: 3     atorvastatin (LIPITOR) 40 MG tablet, Take 1 tablet (40 mg) by mouth every evening, Disp: 90 tablet, Rfl: 3     DULoxetine (CYMBALTA) 60 MG capsule, Take 1 capsule (60 mg) by mouth daily, Disp: 30 capsule, Rfl: 0     folic acid 1 MG PO tablet, Take 3-4 tablets (3-4 mg) by mouth daily, Disp: 120 tablet, Rfl: 7     furosemide (LASIX) 40 MG tablet,  "Take 2 tablets (80 mg) by mouth daily (Patient not taking: Reported on 9/8/2020), Disp: 180 tablet, Rfl: 1     insulin syringe-needle U-100 (BD INSULIN SYRINGE ULTRAFINE) 30G X 1/2\" 1 ML miscellaneous, For Methotrexate use weekly., Disp: 12 each, Rfl: 3     leucovorin 5 MG PO tablet, Take 1 tablet (5 mg) by mouth every 7 days *take 24h after taking weekly methotrexate dose, Disp: 4 tablet, Rfl: 3     lisinopril (PRINIVIL/ZESTRIL) 5 MG tablet, Take 1 tablet (5 mg) by mouth daily, Disp: 90 tablet, Rfl: 3     methotrexate 50 MG/2ML IJ injection, Inject 1 mL (25 mg) Subcutaneous every 7 days, Disp: 12 mL, Rfl: 4     nystatin (MYCOSTATIN) 242658 UNIT/GM external powder, Apply to coat rash tid, neck and breast area rashes, Disp: 60 g, Rfl: 3     order for DME, Please discontinue all oxygen and collect equipment., Disp: 1 Units, Rfl: 0     potassium chloride ER (MICRO-K) 10 MEQ CR capsule, Take 1 capsule (10 mEq) by mouth 2 times daily, Disp: 180 capsule, Rfl: 3     warfarin ANTICOAGULANT (COUMADIN) 5 MG tablet, TAKE 1.5-2 TABLETS DAILY OR AS DIRECTED BY COUMADIN CLINIC., Disp: 90 tablet, Rfl: 5     zolpidem ER (AMBIEN CR) 6.25 MG CR tablet, Take one tab the night of your sleep study at bedtime in sleep center. (Patient not taking: Reported on 9/8/2020), Disp: 1 tablet, Rfl: 0    Current Facility-Administered Medications:      lidocaine (PF) (XYLOCAINE) 1 % injection 4 mL, 4 mL, , , Ken Modi MD, 4 mL at 12/31/19 1030     triamcinolone (KENALOG-40) injection 40 mg, 40 mg, , , Tashi Saxena MD, 40 mg at 05/04/20 0813     triamcinolone (KENALOG-40) injection 40 mg, 40 mg, , , Ken Modi MD, 40 mg at 12/31/19 1030    Facility-Administered Medications Ordered in Other Visits:      sodium chloride (PF) 0.9% PF flush 10 mL, 10 mL, Intracatheter, Once, Loy Joshi, [place] CNP      Allergies:   Adhesive tape and Erythromycin      Past Medical History:  Depression   Eczema   Hyperlipidemia   Hypertension " "  Morbid obesity   Osteoarthrosis right knee   Sleep apnea   Tobacco use disorder   Primary localized osteoarthrosis, pelvic region and thigh  Degeneration of cervical intervertebral disc  Chronic bronchitis   Pulmonary embolism   Cardiac arrest; required ECMO 9-2019     Past Surgical History:  Left total knee arthoplasty 6/14/2012   Right hip arthroplasty 07/09/2004      Family History:   Mother: thyroid disease, hypertension, skin cancer, CVA  Paternal grandmother: breast cancer, pancreatic cancer   Father: alcoholism   Sister: thyroid disease, alcoholism   Maternal grandmother: hypertension   Sister: heart disease, multiple ablations   Paternal grandfather: prostate cancer      Social History:   Current everyday cigarette smoker, 1 pack/day, 33 years, started 1982  No smokeless tobacco use  Occasional alcohol use   Physical Exam:   Ht 1.676 m (5' 6\")   Wt (!) 150.1 kg (331 lb)   Breastfeeding No   BMI 53.42 kg/m     Wt Readings from Last 4 Encounters:   09/15/20 (!) 150.1 kg (331 lb)   08/03/20 (!) 163.3 kg (360 lb)   06/12/20 (!) 155.5 kg (342 lb 14.4 oz)   05/04/20 (!) 165.6 kg (365 lb)     Constitutional: Well-developed, appearing stated age; cooperative  Eyes: Normal EOM, PERRLA, vision, conjunctiva, sclera  ENT: Normal external ears, nose, hearing, lips, teeth, gums, throat. No mucous membrane lesions, normal saliva pool  Neck: No mass or thyroid enlargement  MS: Excellent alignment of MCPs and PIPs. No appearance of dactylitis at either second finger.  Skin: No nail pitting, alopecia, nodules or lesions.    Neuro: Normal cranial nerves  Psych: Normal judgement, orientation, memory, affect.     Laboratory:   RHEUM RESULTS Latest Ref Rng & Units 4/15/2020 6/12/2020 9/4/2020   SED RATE 0 - 30 mm/h - 13 -   CRP, INFLAMMATION 0.0 - 8.0 mg/L - - -   CK TOTAL 30 - 225 U/L - 31 -   RHEUMATOID FACTOR <20 IU/mL - - -   EMERY SCREEN BY EIA <1.0 - - -   AST 0 - 45 U/L 14 14 19   ALT 0 - 50 U/L 22 25 26   ALBUMIN 3.4 - " "5.0 g/dL - 3.4 -   WBC 4.0 - 11.0 10e9/L 7.0 7.3 8.8   RBC 3.8 - 5.2 10e12/L 5.11 5.09 5.23(H)   HGB 11.7 - 15.7 g/dL 15.2 15.3 15.8(H)   HCT 35.0 - 47.0 % 46.8 47.9(H) 48.5(H)   MCV 78 - 100 fl 92 94 93   MCHC 31.5 - 36.5 g/dL 32.5 31.9 32.6   RDW 10.0 - 15.0 % 16.2(H) 15.1(H) 14.7    - 450 10e9/L 302 283 313   CREATININE 0.52 - 1.04 mg/dL 0.70 0.72 0.76   GFR ESTIMATE, IF BLACK >60 mL/min/[1.73:m2] >90 >90 >90   GFR ESTIMATE >60 mL/min/[1.73:m2] >90 >90 86    - 1,620 mg/dL - - -   IGA 70 - 380 mg/dL - - -   IGM 60 - 265 mg/dL - - -   HEPATITIS C ANTIBODY NR - - -       Rheumatoid Factor   Date Value Ref Range Status   08/28/2017 51 (H) <20 IU/mL Final     Cyclic Citrullinated Peptide Antibody, IgG   Date Value Ref Range Status   08/28/2017 15 (H) <7 U/mL Final     Comment:     Positive     EMERY Screen by EIA   Date Value Ref Range Status   12/30/2016 <1.0  Interpretation:  Negative   <1.0 Final     Hepatitis B Core Caron   Date Value Ref Range Status   04/12/2017 Nonreactive NR Final     Hep B Surface Agn   Date Value Ref Range Status   04/12/2017 Nonreactive NR Final     IGG   Date Value Ref Range Status   04/21/2017 940 695 - 1,620 mg/dL Final     IGA   Date Value Ref Range Status   04/21/2017 271 70 - 380 mg/dL Final     IGM   Date Value Ref Range Status   04/21/2017 395 (H) 60 - 265 mg/dL Final     Chief Complaint   Patient presents with     Follow Up     4 months     Height 1.676 m (5' 6\"), weight (!) 150.1 kg (331 lb), not currently breastfeeding.    Shira Rodriguez is a 60 year old female who is being evaluated via a billable video visit.      The patient has been notified of following:     \"This video visit will be conducted via a call between you and your physician/provider. We have found that certain health care needs can be provided without the need for an in-person physical exam.  This service lets us provide the care you need with a video conversation.  If a prescription is necessary we can " "send it directly to your pharmacy.  If lab work is needed we can place an order for that and you can then stop by our lab to have the test done at a later time.    Video visits are billed at different rates depending on your insurance coverage.  Please reach out to your insurance provider with any questions.    If during the course of the call the physician/provider feels a video visit is not appropriate, you will not be charged for this service.\"    Patient has given verbal consent for Video visit? Yes  How would you like to obtain your AVS? MyChart  If you are dropped from the video visit, the video invite should be resent to: Other e-mail: [email or web address] IS IN THE WAITING ROOM  Will anyone else be joining your video visit? No        Video-Visit Details    Type of service:  Video Visit    Video Start Time: 3:12 PM  Video End Time: 3:45 PM    Originating Location (pt. Location): Home    Distant Location (provider location):  [facility] RHEUMATOLOGY     Platform used for Video Visit: Tu Austin MD        "

## 2020-09-15 NOTE — PATIENT INSTRUCTIONS
Diagnosis:  1.  Inflammatory arthritis: Control of joint pain formerly disrupting your activities of daily living is markedly improved with continued use of methotrexate.  2.  Persistent significant fatigue: This may be multifactorial, with contributions from disrupted sleep, depression, and potentially medications.  I recommend following up with pulmonology and with psychiatry as you are.    Plan:  1.  Continue methotrexate 25 mg subcutaneous weekly.  2.  Continue leucovorin 5 mg once weekly, 1 day after methotrexate.  3.  Continue folic acid 1 mg daily.  4.While on methotrexate:   -- Check labs every 3 months (AST/ALT, Albumin, CBC with platelets)   -- Limit alcohol intake to 2 drinks weekly; use folate 1 mg daily.  --Tylenol 500-1000 mg can be used as needed up to three times daily for nausea/headache associated with dosing.

## 2020-09-24 ENCOUNTER — ANTICOAGULATION THERAPY VISIT (OUTPATIENT)
Dept: ANTICOAGULATION | Facility: CLINIC | Age: 60
End: 2020-09-24

## 2020-09-24 DIAGNOSIS — F33.1 MAJOR DEPRESSIVE DISORDER, RECURRENT EPISODE, MODERATE (H): Primary | ICD-10-CM

## 2020-09-24 DIAGNOSIS — Z79.899 ENCOUNTER FOR LONG-TERM (CURRENT) USE OF OTHER MEDICATIONS: ICD-10-CM

## 2020-09-24 DIAGNOSIS — I26.99 PULMONARY EMBOLI (H): ICD-10-CM

## 2020-09-24 DIAGNOSIS — F33.1 MAJOR DEPRESSIVE DISORDER, RECURRENT EPISODE, MODERATE (H): ICD-10-CM

## 2020-09-24 DIAGNOSIS — Z79.01 LONG TERM CURRENT USE OF ANTICOAGULANT THERAPY: ICD-10-CM

## 2020-09-24 LAB
BASOPHILS # BLD AUTO: 0.1 10E9/L (ref 0–0.2)
BASOPHILS NFR BLD AUTO: 0.7 %
CAPILLARY BLOOD COLLECTION: NORMAL
DIFFERENTIAL METHOD BLD: ABNORMAL
EOSINOPHIL # BLD AUTO: 0.1 10E9/L (ref 0–0.7)
EOSINOPHIL NFR BLD AUTO: 1.9 %
ERYTHROCYTE [DISTWIDTH] IN BLOOD BY AUTOMATED COUNT: 15.1 % (ref 10–15)
HBA1C MFR BLD: 5.1 % (ref 0–5.6)
HCT VFR BLD AUTO: 44.1 % (ref 35–47)
HGB BLD-MCNC: 15 G/DL (ref 11.7–15.7)
INR PPP: 3.3 (ref 0.86–1.14)
LYMPHOCYTES # BLD AUTO: 1 10E9/L (ref 0.8–5.3)
LYMPHOCYTES NFR BLD AUTO: 14.9 %
MCH RBC QN AUTO: 30.9 PG (ref 26.5–33)
MCHC RBC AUTO-ENTMCNC: 34 G/DL (ref 31.5–36.5)
MCV RBC AUTO: 91 FL (ref 78–100)
MONOCYTES # BLD AUTO: 0.3 10E9/L (ref 0–1.3)
MONOCYTES NFR BLD AUTO: 4.8 %
NEUTROPHILS # BLD AUTO: 5.3 10E9/L (ref 1.6–8.3)
NEUTROPHILS NFR BLD AUTO: 77.7 %
PLATELET # BLD AUTO: 146 10E9/L (ref 150–450)
RBC # BLD AUTO: 4.85 10E12/L (ref 3.8–5.2)
WBC # BLD AUTO: 6.8 10E9/L (ref 4–11)

## 2020-09-24 PROCEDURE — 84439 ASSAY OF FREE THYROXINE: CPT | Performed by: NURSE PRACTITIONER

## 2020-09-24 PROCEDURE — 36416 COLLJ CAPILLARY BLOOD SPEC: CPT | Performed by: FAMILY MEDICINE

## 2020-09-24 PROCEDURE — 85610 PROTHROMBIN TIME: CPT | Performed by: FAMILY MEDICINE

## 2020-09-24 PROCEDURE — 83036 HEMOGLOBIN GLYCOSYLATED A1C: CPT | Performed by: NURSE PRACTITIONER

## 2020-09-24 PROCEDURE — 80061 LIPID PANEL: CPT | Performed by: NURSE PRACTITIONER

## 2020-09-24 PROCEDURE — 80050 GENERAL HEALTH PANEL: CPT | Performed by: NURSE PRACTITIONER

## 2020-09-24 NOTE — PROGRESS NOTES
ANTICOAGULATION FOLLOW-UP CLINIC VISIT    Patient Name:  Shira Rodriguez  Date:  9/24/2020  Contact Type:  Telephone    SUBJECTIVE:  Patient Findings     Comments:   Denies any changes        Clinical Outcomes     Negatives:   Major bleeding event, Thromboembolic event, Anticoagulation-related hospital admission, Anticoagulation-related ED visit, Anticoagulation-related fatality    Comments:   Denies any changes           OBJECTIVE    Recent labs: (last 7 days)     09/24/20  1414   INR 3.30*       ASSESSMENT / PLAN  INR assessment SUPRA    Recheck INR In: 2 WEEKS    INR Location Clinic      Anticoagulation Summary  As of 9/24/2020    INR goal:   2.0-3.0   TTR:   41.0 % (11.3 mo)   INR used for dosing:   3.30! (9/24/2020)   Warfarin maintenance plan:   7.5 mg (5 mg x 1.5) every day   Full warfarin instructions:   9/24: 5 mg; Otherwise 7.5 mg every day   Weekly warfarin total:   52.5 mg   Plan last modified:   Patience Martins RN (9/24/2020)   Next INR check:   10/8/2020   Priority:   High   Target end date:   Indefinite    Indications    Pulmonary emboli (H) [I26.99]  Long term current use of anticoagulant therapy [Z79.01]             Anticoagulation Episode Summary     INR check location:       Preferred lab:       Send INR reminders to:   [facility]    Comments:   HIPPA Forms returned 11/13/19  OK to leave messages on Cell  [phone]      Anticoagulation Care Providers     Provider Role Specialty Phone number    Anjel Busby MD Referring [facility] [phone]            See the Encounter Report to view Anticoagulation Flowsheet and Dosing Calendar (Go to Encounters tab in chart review, and find the Anticoagulation Therapy Visit)    Spoke with patient. Gave them their lab results and new warfarin recommendation.  No changes in health, medication, or diet. No missed doses, no falls. No signs or symptoms of bleed or clotting.     Patience Martins RN

## 2020-09-25 LAB
ALBUMIN SERPL-MCNC: 3.3 G/DL (ref 3.4–5)
ALP SERPL-CCNC: 86 U/L (ref 40–150)
ALT SERPL W P-5'-P-CCNC: 30 U/L (ref 0–50)
ANION GAP SERPL CALCULATED.3IONS-SCNC: 6 MMOL/L (ref 3–14)
AST SERPL W P-5'-P-CCNC: 25 U/L (ref 0–45)
BILIRUB SERPL-MCNC: 0.5 MG/DL (ref 0.2–1.3)
BUN SERPL-MCNC: 12 MG/DL (ref 7–30)
CALCIUM SERPL-MCNC: 9.6 MG/DL (ref 8.5–10.1)
CHLORIDE SERPL-SCNC: 106 MMOL/L (ref 94–109)
CHOLEST SERPL-MCNC: 156 MG/DL
CO2 SERPL-SCNC: 26 MMOL/L (ref 20–32)
CREAT SERPL-MCNC: 0.73 MG/DL (ref 0.52–1.04)
GFR SERPL CREATININE-BSD FRML MDRD: >90 ML/MIN/{1.73_M2}
GLUCOSE SERPL-MCNC: 94 MG/DL (ref 70–99)
HDLC SERPL-MCNC: 61 MG/DL
LDLC SERPL CALC-MCNC: 67 MG/DL
NONHDLC SERPL-MCNC: 95 MG/DL
POTASSIUM SERPL-SCNC: 4 MMOL/L (ref 3.4–5.3)
PROT SERPL-MCNC: 7.8 G/DL (ref 6.8–8.8)
SODIUM SERPL-SCNC: 138 MMOL/L (ref 133–144)
T4 FREE SERPL-MCNC: 1.07 NG/DL (ref 0.76–1.46)
TRIGL SERPL-MCNC: 139 MG/DL
TSH SERPL DL<=0.005 MIU/L-ACNC: 1.59 MU/L (ref 0.4–4)

## 2020-10-23 ENCOUNTER — ANTICOAGULATION THERAPY VISIT (OUTPATIENT)
Dept: ANTICOAGULATION | Facility: CLINIC | Age: 60
End: 2020-10-23

## 2020-10-23 DIAGNOSIS — I26.99 PULMONARY EMBOLI (H): ICD-10-CM

## 2020-10-23 DIAGNOSIS — Z79.01 LONG TERM CURRENT USE OF ANTICOAGULANT THERAPY: ICD-10-CM

## 2020-10-23 LAB
CAPILLARY BLOOD COLLECTION: NORMAL
INR PPP: 3.8 (ref 0.86–1.14)

## 2020-10-23 PROCEDURE — 36416 COLLJ CAPILLARY BLOOD SPEC: CPT | Performed by: FAMILY MEDICINE

## 2020-10-23 PROCEDURE — 85610 PROTHROMBIN TIME: CPT | Performed by: FAMILY MEDICINE

## 2020-10-23 NOTE — PROGRESS NOTES
ANTICOAGULATION FOLLOW-UP CLINIC VISIT    Patient Name:  Shira Rodriguez  Date:  10/23/2020  Contact Type:  Telephone    SUBJECTIVE:         OBJECTIVE    Recent labs: (last 7 days)     10/23/20  1442   INR 3.80*       ASSESSMENT / PLAN  INR assessment SUPRA    Recheck INR In: 1 WEEK    INR Location Clinic      Anticoagulation Summary  As of 10/23/2020    INR goal:  2.0-3.0   TTR:  37.3 % (1 y)   INR used for dosing:  3.80 (10/23/2020)   Warfarin maintenance plan:  5 mg (5 mg x 1) every Mon, Fri; 7.5 mg (5 mg x 1.5) all other days   Full warfarin instructions:  10/23: 5 mg; 10/26: 5 mg; Otherwise 5 mg every Mon, Fri; 7.5 mg all other days   Weekly warfarin total:  47.5 mg   Plan last modified:  Fauzia Gonzales RN (10/23/2020)   Next INR check:  10/30/2020   Priority:  High   Target end date:  Indefinite    Indications    Pulmonary emboli (H) [I26.99]  Long term current use of anticoagulant therapy [Z79.01]             Anticoagulation Episode Summary     INR check location:      Preferred lab:      Send INR reminders to:  [facility]    Comments:  HIPPA Forms returned 11/13/19  OK to leave messages on Cell  [phone]      Anticoagulation Care Providers     Provider Role Specialty Phone number    Anjel Busby MD Referring [facility] [phone]            See the Encounter Report to view Anticoagulation Flowsheet and Dosing Calendar (Go to Encounters tab in chart review, and find the Anticoagulation Therapy Visit)  Spoke with patient.    Fauzia Gonzales RN

## 2020-11-13 ENCOUNTER — ANTICOAGULATION THERAPY VISIT (OUTPATIENT)
Dept: ANTICOAGULATION | Facility: CLINIC | Age: 60
End: 2020-11-13

## 2020-11-13 DIAGNOSIS — Z79.01 LONG TERM CURRENT USE OF ANTICOAGULANT THERAPY: ICD-10-CM

## 2020-11-13 DIAGNOSIS — I26.99 PULMONARY EMBOLI (H): ICD-10-CM

## 2020-11-13 LAB
CAPILLARY BLOOD COLLECTION: NORMAL
INR PPP: 2.7 (ref 0.86–1.14)

## 2020-11-13 PROCEDURE — 36416 COLLJ CAPILLARY BLOOD SPEC: CPT | Performed by: FAMILY MEDICINE

## 2020-11-13 PROCEDURE — 85610 PROTHROMBIN TIME: CPT | Performed by: FAMILY MEDICINE

## 2020-11-13 NOTE — PROGRESS NOTES
ANTICOAGULATION FOLLOW-UP CLINIC VISIT    Patient Name:  Shira Rodriguez  Date:  2020  Contact Type:  Telephone    SUBJECTIVE:  Patient Findings     Comments:  Left message for patient        Clinical Outcomes     Comments:  Left message for patient           OBJECTIVE    Recent labs: (last 7 days)     20  1345   INR 2.70*       ASSESSMENT / PLAN  INR assessment THER    Recheck INR In: 4 WEEKS    INR Location Clinic      Anticoagulation Summary  As of 2020    INR goal:  2.0-3.0   TTR:  36.7 % (1 y)   INR used for dosin.70 (2020)   Warfarin maintenance plan:  5 mg (5 mg x 1) every Mon, Fri; 7.5 mg (5 mg x 1.5) all other days   Full warfarin instructions:  5 mg every Mon, Fri; 7.5 mg all other days   Weekly warfarin total:  47.5 mg   No change documented:  Rich Noe RN   Plan last modified:  Fauzia Gonzales RN (10/23/2020)   Next INR check:  2020   Priority:  High   Target end date:  Indefinite    Indications    Pulmonary emboli (H) [I26.99]  Long term current use of anticoagulant therapy [Z79.01]             Anticoagulation Episode Summary     INR check location:      Preferred lab:      Send INR reminders to:  [facility]    Comments:  HIPPA Forms returned 19  OK to leave messages on Cell  [phone]      Anticoagulation Care Providers     Provider Role Specialty Phone number    Anjel Busby MD Referring Family Medicine [phone]            See the Encounter Report to view Anticoagulation Flowsheet and Dosing Calendar (Go to Encounters tab in chart review, and find the Anticoagulation Therapy Visit)    INR/CFX/F2 RESULT:INR result is 2.70    ASSESSMENT:Left message for patient with results and dosing recommendations. Asked patient to call back to report any missed doses, falls, signs and symptoms of bleeding or clotting, any changes in health, medication, or diet. Asked patient to call back with any questions or concerns.    DOSING  ADJUSTMENT:continue pattern of dosing    NEXT INR/FACTOR X OR FACTOR II:12/11    PROTOCOL FOLLOWED:goal 2-3    Rich Noe, RN

## 2020-11-16 ENCOUNTER — HEALTH MAINTENANCE LETTER (OUTPATIENT)
Age: 60
End: 2020-11-16

## 2020-12-11 DIAGNOSIS — I46.9 CARDIAC ARREST (H): ICD-10-CM

## 2020-12-15 RX ORDER — LISINOPRIL 5 MG/1
5 TABLET ORAL DAILY
Qty: 90 TABLET | Refills: 1 | Status: SHIPPED | OUTPATIENT
Start: 2020-12-15 | End: 2021-02-09

## 2020-12-19 DIAGNOSIS — E78.5 HYPERLIPIDEMIA LDL GOAL <100: ICD-10-CM

## 2020-12-23 RX ORDER — ATORVASTATIN CALCIUM 40 MG/1
40 TABLET, FILM COATED ORAL DAILY
Qty: 90 TABLET | Refills: 3 | Status: SHIPPED | OUTPATIENT
Start: 2020-12-23 | End: 2023-01-28

## 2020-12-23 NOTE — TELEPHONE ENCOUNTER
ATORVASTATIN 40 MG TABLET   Last Written Prescription Date:  11/22/2019  Last Fill Quantity: 90,   # refills: 3  Last Office Visit : 6/12/2020  Future Office visit:  None  90 Tabs, 3 Refills sent to pharm 12/23/2020      Emperatriz Beltre RN  Central Triage Red Flags/Med Refills

## 2021-01-05 DIAGNOSIS — I26.09 PULMONARY EMBOLISM WITH ACUTE COR PULMONALE, UNSPECIFIED CHRONICITY, UNSPECIFIED PULMONARY EMBOLISM TYPE (H): ICD-10-CM

## 2021-01-05 RX ORDER — WARFARIN SODIUM 5 MG/1
TABLET ORAL
Qty: 180 TABLET | Refills: 0 | Status: SHIPPED | OUTPATIENT
Start: 2021-01-05 | End: 2021-08-04

## 2021-01-13 ENCOUNTER — ANTICOAGULATION THERAPY VISIT (OUTPATIENT)
Dept: ANTICOAGULATION | Facility: CLINIC | Age: 61
End: 2021-01-13

## 2021-01-13 DIAGNOSIS — M05.742 RHEUMATOID ARTHRITIS INVOLVING BOTH HANDS WITH POSITIVE RHEUMATOID FACTOR (H): ICD-10-CM

## 2021-01-13 DIAGNOSIS — Z79.01 LONG TERM CURRENT USE OF ANTICOAGULANT THERAPY: ICD-10-CM

## 2021-01-13 DIAGNOSIS — I26.99 PULMONARY EMBOLI (H): ICD-10-CM

## 2021-01-13 DIAGNOSIS — M05.741 RHEUMATOID ARTHRITIS INVOLVING BOTH HANDS WITH POSITIVE RHEUMATOID FACTOR (H): ICD-10-CM

## 2021-01-13 LAB
CAPILLARY BLOOD COLLECTION: NORMAL
CRP SERPL-MCNC: 4.3 MG/L (ref 0–8)
ERYTHROCYTE [DISTWIDTH] IN BLOOD BY AUTOMATED COUNT: 14.3 % (ref 10–15)
ERYTHROCYTE [SEDIMENTATION RATE] IN BLOOD BY WESTERGREN METHOD: 15 MM/H (ref 0–30)
HCT VFR BLD AUTO: 45.8 % (ref 35–47)
HGB BLD-MCNC: 15.4 G/DL (ref 11.7–15.7)
INR PPP: 2.2 (ref 0.86–1.14)
MCH RBC QN AUTO: 31.8 PG (ref 26.5–33)
MCHC RBC AUTO-ENTMCNC: 33.6 G/DL (ref 31.5–36.5)
MCV RBC AUTO: 95 FL (ref 78–100)
PLATELET # BLD AUTO: 188 10E9/L (ref 150–450)
RBC # BLD AUTO: 4.84 10E12/L (ref 3.8–5.2)
WBC # BLD AUTO: 5.9 10E9/L (ref 4–11)

## 2021-01-13 PROCEDURE — 86140 C-REACTIVE PROTEIN: CPT | Performed by: INTERNAL MEDICINE

## 2021-01-13 PROCEDURE — 85652 RBC SED RATE AUTOMATED: CPT | Performed by: INTERNAL MEDICINE

## 2021-01-13 PROCEDURE — 36415 COLL VENOUS BLD VENIPUNCTURE: CPT | Performed by: INTERNAL MEDICINE

## 2021-01-13 PROCEDURE — 82565 ASSAY OF CREATININE: CPT | Performed by: INTERNAL MEDICINE

## 2021-01-13 PROCEDURE — 85610 PROTHROMBIN TIME: CPT | Performed by: FAMILY MEDICINE

## 2021-01-13 PROCEDURE — 84450 TRANSFERASE (AST) (SGOT): CPT | Performed by: INTERNAL MEDICINE

## 2021-01-13 PROCEDURE — 84460 ALANINE AMINO (ALT) (SGPT): CPT | Performed by: INTERNAL MEDICINE

## 2021-01-13 PROCEDURE — 85027 COMPLETE CBC AUTOMATED: CPT | Performed by: INTERNAL MEDICINE

## 2021-01-13 NOTE — PROGRESS NOTES
ANTICOAGULATION FOLLOW-UP CLINIC VISIT    Patient Name:  Shira Rodriguez  Date:  2021  Contact Type:  Telephone    SUBJECTIVE:         OBJECTIVE    Recent labs: (last 7 days)     21  1420   INR 2.20*       ASSESSMENT / PLAN  INR assessment THER    Recheck INR In: 4 WEEKS    INR Location Clinic      Anticoagulation Summary  As of 2021    INR goal:  2.0-3.0   TTR:  44.9 % (1 y)   INR used for dosin.20 (2021)   Warfarin maintenance plan:  5 mg (5 mg x 1) every Mon, Fri; 7.5 mg (5 mg x 1.5) all other days   Full warfarin instructions:  5 mg every Mon, Fri; 7.5 mg all other days   Weekly warfarin total:  47.5 mg   No change documented:  Patience aMrtins RN   Plan last modified:  Fauzia Gonzales RN (10/23/2020)   Next INR check:  2/10/2021   Priority:  Maintenance   Target end date:  Indefinite    Indications    Pulmonary emboli (H) [I26.99]  Long term current use of anticoagulant therapy [Z79.01]             Anticoagulation Episode Summary     INR check location:      Preferred lab:      Send INR reminders to:  [facility]    Comments:  HIPPA Forms returned 19  OK to leave messages on Cell  [phone]      Anticoagulation Care Providers     Provider Role Specialty Phone number    Anjel Busby MD Referring Family Medicine [phone]            See the Encounter Report to view Anticoagulation Flowsheet and Dosing Calendar (Go to Encounters tab in chart review, and find the Anticoagulation Therapy Visit)    Left message for patient with results and dosing recommendations. Asked patient to call back to report any missed doses, falls, signs and symptoms of bleeding or clotting, any changes in health, medication, or diet. Asked patient to call back with any questions or concerns.     Patience Martins, ELLIS

## 2021-01-14 LAB
ALT SERPL W P-5'-P-CCNC: 23 U/L (ref 0–50)
AST SERPL W P-5'-P-CCNC: 15 U/L (ref 0–45)
CREAT SERPL-MCNC: 0.74 MG/DL (ref 0.52–1.04)
GFR SERPL CREATININE-BSD FRML MDRD: 89 ML/MIN/{1.73_M2}

## 2021-01-15 ENCOUNTER — HEALTH MAINTENANCE LETTER (OUTPATIENT)
Age: 61
End: 2021-01-15

## 2021-01-29 ENCOUNTER — MYC MEDICAL ADVICE (OUTPATIENT)
Dept: FAMILY MEDICINE | Facility: CLINIC | Age: 61
End: 2021-01-29

## 2021-02-06 DIAGNOSIS — I46.9 CARDIAC ARREST (H): ICD-10-CM

## 2021-02-09 RX ORDER — LISINOPRIL 5 MG/1
5 TABLET ORAL DAILY
Qty: 90 TABLET | Refills: 1 | Status: SHIPPED | OUTPATIENT
Start: 2021-02-09 | End: 2021-08-08

## 2021-02-19 ENCOUNTER — TELEPHONE (OUTPATIENT)
Dept: ANTICOAGULATION | Facility: CLINIC | Age: 61
End: 2021-02-19

## 2021-02-23 ENCOUNTER — ANTICOAGULATION THERAPY VISIT (OUTPATIENT)
Dept: ANTICOAGULATION | Facility: CLINIC | Age: 61
End: 2021-02-23

## 2021-02-23 DIAGNOSIS — M05.79 RHEUMATOID ARTHRITIS INVOLVING MULTIPLE SITES WITH POSITIVE RHEUMATOID FACTOR (H): ICD-10-CM

## 2021-02-23 DIAGNOSIS — M05.742 RHEUMATOID ARTHRITIS INVOLVING BOTH HANDS WITH POSITIVE RHEUMATOID FACTOR (H): ICD-10-CM

## 2021-02-23 DIAGNOSIS — I26.99 PULMONARY EMBOLI (H): ICD-10-CM

## 2021-02-23 DIAGNOSIS — M05.741 RHEUMATOID ARTHRITIS INVOLVING BOTH HANDS WITH POSITIVE RHEUMATOID FACTOR (H): ICD-10-CM

## 2021-02-23 DIAGNOSIS — Z79.01 LONG TERM CURRENT USE OF ANTICOAGULANT THERAPY: ICD-10-CM

## 2021-02-23 DIAGNOSIS — Z79.899 HIGH RISK MEDICATION USE: ICD-10-CM

## 2021-02-23 LAB
CAPILLARY BLOOD COLLECTION: NORMAL
INR PPP: 3.2 (ref 0.86–1.14)

## 2021-02-23 PROCEDURE — 85610 PROTHROMBIN TIME: CPT | Performed by: FAMILY MEDICINE

## 2021-02-23 PROCEDURE — 36416 COLLJ CAPILLARY BLOOD SPEC: CPT | Performed by: FAMILY MEDICINE

## 2021-02-23 NOTE — PROGRESS NOTES
ANTICOAGULATION FOLLOW-UP CLINIC VISIT    Patient Name:  Shira Rodriguez  Date:  2/23/2021  Contact Type:  Telephone    SUBJECTIVE:  Patient Findings     Comments:  Spoke to Shira.  Followed protocol.  Patient's last two results are within goal range.  No change made to maintenance dose.        Clinical Outcomes     Comments:  Spoke to Shira.  Followed protocol.  Patient's last two results are within goal range.  No change made to maintenance dose.           OBJECTIVE    Recent labs: (last 7 days)     02/23/21  1357   INR 3.20*       ASSESSMENT / PLAN  INR assessment SUPRA    Recheck INR In: 2 WEEKS    INR Location Clinic      Anticoagulation Summary  As of 2/23/2021    INR goal:  2.0-3.0   TTR:  50.8 % (1 y)   INR used for dosing:  3.20 (2/23/2021)   Warfarin maintenance plan:  5 mg (5 mg x 1) every Mon, Fri; 7.5 mg (5 mg x 1.5) all other days   Full warfarin instructions:  5 mg every Mon, Fri; 7.5 mg all other days   Weekly warfarin total:  47.5 mg   No change documented:  Rich Noe RN   Plan last modified:  Fauzia Gonzales RN (10/23/2020)   Next INR check:  3/9/2021   Priority:  Maintenance   Target end date:  Indefinite    Indications    Pulmonary emboli (H) [I26.99]  Long term current use of anticoagulant therapy [Z79.01]             Anticoagulation Episode Summary     INR check location:      Preferred lab:      Send INR reminders to:  [facility]    Comments:  HIPPA Forms returned 11/13/19  OK to leave messages on Cell  [phone]      Anticoagulation Care Providers     Provider Role Specialty Phone number    Anjel Busby MD Referring Family Medicine [phone]            See the Encounter Report to view Anticoagulation Flowsheet and Dosing Calendar (Go to Encounters tab in chart review, and find the Anticoagulation Therapy Visit)    INR/CFX/F2 RESULT:INR result is 3.20    ASSESSMENT:Spoke with patient. Gave them their lab results and new warfarin recommendation.  No changes  in health, medication, or diet. No missed doses, no falls. No signs or symptoms of bleed or clotting.    DOSING ADJUSTMENT:continue maintenance dose    NEXT INR/FACTOR X OR FACTOR II:3/9    PROTOCOL FOLLOWED:goal 2-3    Rich Noe, RN

## 2021-02-24 RX ORDER — METHOTREXATE 25 MG/ML
25 INJECTION, SOLUTION INTRA-ARTERIAL; INTRAMUSCULAR; INTRAVENOUS
Qty: 12 ML | Refills: 1 | Status: SHIPPED | OUTPATIENT
Start: 2021-02-24 | End: 2021-05-18

## 2021-02-24 RX ORDER — FOLIC ACID 1 MG/1
3-4 TABLET ORAL DAILY
Qty: 120 TABLET | Refills: 7 | Status: SHIPPED | OUTPATIENT
Start: 2021-02-24 | End: 2021-05-18

## 2021-02-24 NOTE — TELEPHONE ENCOUNTER
methotrexate 50 MG/2ML injection  Last Written Prescription Date:  9/15/2020  Last Fill Quantity: 12,   # refills: 4  Last Office Visit: 9/15/2020  Future Office visit:  None    CBC RESULTS:   Recent Labs   Lab Test 01/13/21  1419   WBC 5.9   RBC 4.84   HGB 15.4   HCT 45.8   MCV 95   MCH 31.8   MCHC 33.6   RDW 14.3          Creatinine   Date Value Ref Range Status   01/13/2021 0.74 0.52 - 1.04 mg/dL Final   ]    Liver Function Studies -   Recent Labs   Lab Test 01/13/21  1419 09/24/20  1415   PROTTOTAL  --  7.8   ALBUMIN  --  3.3*   BILITOTAL  --  0.5   ALKPHOS  --  86   AST 15 25   ALT 23 30       Routing refill request to provider for review/approval because:  Drug not on the FM, P or [facility] refill protocol or controlled substance    Emperatriz Beltre RN  Central Triage Red Flags/Med Refills    folic acid (FOLVITE) 1 MG tablet  Last Written Prescription Date:  9/15/2020  Last Fill Quantity: 120,   # refills: 7  Last Office Visit : 9/15/2020  Future Office visit:  None  120 Tabs, 7 Refills sent to pharm

## 2021-03-09 ENCOUNTER — MYC MEDICAL ADVICE (OUTPATIENT)
Dept: FAMILY MEDICINE | Facility: CLINIC | Age: 61
End: 2021-03-09

## 2021-03-12 ENCOUNTER — IMMUNIZATION (OUTPATIENT)
Dept: NURSING | Facility: CLINIC | Age: 61
End: 2021-03-12
Payer: COMMERCIAL

## 2021-03-12 PROCEDURE — 0001A PR COVID VAC PFIZER DIL RECON 30 MCG/0.3 ML IM: CPT

## 2021-03-12 PROCEDURE — 91300 PR COVID VAC PFIZER DIL RECON 30 MCG/0.3 ML IM: CPT

## 2021-03-19 ENCOUNTER — TELEPHONE (OUTPATIENT)
Dept: ANTICOAGULATION | Facility: CLINIC | Age: 61
End: 2021-03-19

## 2021-03-19 NOTE — TELEPHONE ENCOUNTER
ANTICOAGULATION     Shira Rodriguez is overdue for INR check.      Left message for patient to call and schedule lab appointment as soon as possible. If returning call, please schedule.     Patience Martins RN

## 2021-04-02 ENCOUNTER — IMMUNIZATION (OUTPATIENT)
Dept: NURSING | Facility: CLINIC | Age: 61
End: 2021-04-02
Attending: INTERNAL MEDICINE
Payer: COMMERCIAL

## 2021-04-02 DIAGNOSIS — T45.1X5A ADVERSE EFFECT OF METHOTREXATE, INITIAL ENCOUNTER: ICD-10-CM

## 2021-04-02 PROCEDURE — 0002A PR COVID VAC PFIZER DIL RECON 30 MCG/0.3 ML IM: CPT

## 2021-04-02 PROCEDURE — 91300 PR COVID VAC PFIZER DIL RECON 30 MCG/0.3 ML IM: CPT

## 2021-04-03 NOTE — TELEPHONE ENCOUNTER
leucovorin (WELLCOVORIN) 5 MG tablet     Last Written Prescription Date: 9/15/20  Last Fill Quantity: 4 tabs ,   # refills: 5  Last Office Visit : 9/15/20  Future Office visit:  None    Routing refill request to provider for review/approval because:  not on protocol.

## 2021-04-04 RX ORDER — LEUCOVORIN CALCIUM 5 MG/1
5 TABLET ORAL
Qty: 4 TABLET | Refills: 5 | OUTPATIENT
Start: 2021-04-04

## 2021-04-05 ENCOUNTER — ANTICOAGULATION THERAPY VISIT (OUTPATIENT)
Dept: ANTICOAGULATION | Facility: CLINIC | Age: 61
End: 2021-04-05

## 2021-04-05 DIAGNOSIS — I26.99 PULMONARY EMBOLI (H): ICD-10-CM

## 2021-04-05 DIAGNOSIS — Z79.01 LONG TERM CURRENT USE OF ANTICOAGULANT THERAPY: ICD-10-CM

## 2021-04-05 LAB
CAPILLARY BLOOD COLLECTION: NORMAL
INR PPP: 3.5 (ref 0.86–1.14)

## 2021-04-05 PROCEDURE — 85610 PROTHROMBIN TIME: CPT | Performed by: FAMILY MEDICINE

## 2021-04-05 PROCEDURE — 36416 COLLJ CAPILLARY BLOOD SPEC: CPT | Performed by: FAMILY MEDICINE

## 2021-04-05 NOTE — PROGRESS NOTES
ANTICOAGULATION FOLLOW-UP CLINIC VISIT    Patient Name:  Shira Rodriguez  Date:  4/5/2021  Contact Type:  Telephone    SUBJECTIVE:         OBJECTIVE    Recent labs: (last 7 days)     04/05/21  1138   INR 3.50*       ASSESSMENT / PLAN  INR assessment SUPRA    Recheck INR In: 2 WEEKS    INR Location Clinic      Anticoagulation Summary  As of 4/5/2021    INR goal:  2.0-3.0   TTR:  48.7 % (1 y)   INR used for dosing:  3.50 (4/5/2021)   Warfarin maintenance plan:  5 mg (5 mg x 1) every Mon, Wed, Fri; 7.5 mg (5 mg x 1.5) all other days   Full warfarin instructions:  5 mg every Mon, Wed, Fri; 7.5 mg all other days   Weekly warfarin total:  45 mg   Plan last modified:  Mariah Sellers RN (4/5/2021)   Next INR check:  4/19/2021   Priority:  Maintenance   Target end date:  Indefinite    Indications    Pulmonary emboli (H) [I26.99]  Long term current use of anticoagulant therapy [Z79.01]             Anticoagulation Episode Summary     INR check location:      Preferred lab:      Send INR reminders to:  [facility]    Comments:  HIPPA Forms returned 11/13/19  OK to leave messages on Cell  [phone]      Anticoagulation Care Providers     Provider Role Specialty Phone number    Anjel Busby MD Referring Family Medicine [phone]            See the Encounter Report to view Anticoagulation Flowsheet and Dosing Calendar (Go to Encounters tab in chart review, and find the Anticoagulation Therapy Visit)    Left message for patient with results and dosing recommendations. Asked patient to call back to report any missed doses, falls, signs and symptoms of bleeding or clotting, any changes in health, medication, or diet. Asked patient to call back with any questions or concerns.      Mariah Sellers RN

## 2021-04-26 ENCOUNTER — TELEPHONE (OUTPATIENT)
Dept: ANTICOAGULATION | Facility: CLINIC | Age: 61
End: 2021-04-26

## 2021-05-04 ENCOUNTER — ANTICOAGULATION THERAPY VISIT (OUTPATIENT)
Dept: ANTICOAGULATION | Facility: CLINIC | Age: 61
End: 2021-05-04

## 2021-05-04 DIAGNOSIS — I26.99 PULMONARY EMBOLI (H): ICD-10-CM

## 2021-05-04 DIAGNOSIS — Z79.01 LONG TERM CURRENT USE OF ANTICOAGULANT THERAPY: ICD-10-CM

## 2021-05-04 LAB
CAPILLARY BLOOD COLLECTION: NORMAL
INR PPP: 3 (ref 0.86–1.14)

## 2021-05-04 PROCEDURE — 85610 PROTHROMBIN TIME: CPT | Performed by: FAMILY MEDICINE

## 2021-05-04 PROCEDURE — 36416 COLLJ CAPILLARY BLOOD SPEC: CPT | Performed by: FAMILY MEDICINE

## 2021-05-04 NOTE — PROGRESS NOTES
ANTICOAGULATION FOLLOW-UP CLINIC VISIT    Patient Name:  Shira Rodriguez  Date:  5/4/2021  Contact Type:  Telephone    SUBJECTIVE:  Patient Findings     Comments:  Left message for patient with results and dosing recommendations. Asked patient to call back to report any missed doses, falls, signs and symptoms of bleeding or clotting, any changes in health, medication, or diet. Asked patient to call back with any questions or concerns.          Clinical Outcomes     Comments:  Left message for patient with results and dosing recommendations. Asked patient to call back to report any missed doses, falls, signs and symptoms of bleeding or clotting, any changes in health, medication, or diet. Asked patient to call back with any questions or concerns.             OBJECTIVE    Recent labs: (last 7 days)     05/04/21  1049   INR 3.00*       ASSESSMENT / PLAN  INR assessment THER    Recheck INR In: 2 WEEKS    INR Location Clinic      Anticoagulation Summary  As of 5/4/2021    INR goal:  2.0-3.0   TTR:  46.3 % (1 y)   INR used for dosing:  3.00 (5/4/2021)   Warfarin maintenance plan:  5 mg (5 mg x 1) every Mon, Wed, Fri; 7.5 mg (5 mg x 1.5) all other days   Full warfarin instructions:  5 mg every Mon, Wed, Fri; 7.5 mg all other days   Weekly warfarin total:  45 mg   No change documented:  Dior Hutchison RN   Plan last modified:  Mariah Sellers RN (4/5/2021)   Next INR check:  5/18/2021   Priority:  Maintenance   Target end date:  Indefinite    Indications    Pulmonary emboli (H) [I26.99]  Long term current use of anticoagulant therapy [Z79.01]             Anticoagulation Episode Summary     INR check location:      Preferred lab:      Send INR reminders to:  [facility]    Comments:  HIPPA Forms returned 11/13/19  OK to leave messages on Cell  [phone]      Anticoagulation Care Providers     Provider Role Specialty Phone number    Anjel Busby MD Referring Family Medicine [phone]            See  the Encounter Report to view Anticoagulation Flowsheet and Dosing Calendar (Go to Encounters tab in chart review, and find the Anticoagulation Therapy Visit)    INR/CFX/F2 Result: 3.00  Goal Range: 2-3  Assessment: unable to reach for an assessment  Dosing Adjustment: none made  Next INR/CFX/F2:  Two weeks  Protocol Followed: 2-3    YUNIER DAVIDSON RN

## 2021-05-18 ENCOUNTER — OFFICE VISIT (OUTPATIENT)
Dept: RHEUMATOLOGY | Facility: CLINIC | Age: 61
End: 2021-05-18
Attending: INTERNAL MEDICINE
Payer: COMMERCIAL

## 2021-05-18 ENCOUNTER — ANTICOAGULATION THERAPY VISIT (OUTPATIENT)
Dept: ANTICOAGULATION | Facility: CLINIC | Age: 61
End: 2021-05-18

## 2021-05-18 VITALS
BODY MASS INDEX: 51.08 KG/M2 | OXYGEN SATURATION: 94 % | HEART RATE: 85 BPM | SYSTOLIC BLOOD PRESSURE: 107 MMHG | DIASTOLIC BLOOD PRESSURE: 72 MMHG | WEIGHT: 293 LBS

## 2021-05-18 DIAGNOSIS — M05.742 RHEUMATOID ARTHRITIS INVOLVING BOTH HANDS WITH POSITIVE RHEUMATOID FACTOR (H): ICD-10-CM

## 2021-05-18 DIAGNOSIS — Z79.899 HIGH RISK MEDICATION USE: ICD-10-CM

## 2021-05-18 DIAGNOSIS — M05.79 RHEUMATOID ARTHRITIS INVOLVING MULTIPLE SITES WITH POSITIVE RHEUMATOID FACTOR (H): ICD-10-CM

## 2021-05-18 DIAGNOSIS — I26.99 PULMONARY EMBOLI (H): ICD-10-CM

## 2021-05-18 DIAGNOSIS — Z79.01 LONG TERM CURRENT USE OF ANTICOAGULANT THERAPY: ICD-10-CM

## 2021-05-18 DIAGNOSIS — Z79.631 LONG TERM METHOTREXATE USER: Primary | ICD-10-CM

## 2021-05-18 DIAGNOSIS — M05.741 RHEUMATOID ARTHRITIS INVOLVING BOTH HANDS WITH POSITIVE RHEUMATOID FACTOR (H): ICD-10-CM

## 2021-05-18 PROCEDURE — G0463 HOSPITAL OUTPT CLINIC VISIT: HCPCS

## 2021-05-18 PROCEDURE — 99214 OFFICE O/P EST MOD 30 MIN: CPT | Performed by: INTERNAL MEDICINE

## 2021-05-18 RX ORDER — FOLIC ACID 1 MG/1
3-4 TABLET ORAL DAILY
Qty: 120 TABLET | Refills: 7 | Status: SHIPPED | OUTPATIENT
Start: 2021-05-18 | End: 2022-09-29

## 2021-05-18 RX ORDER — METHOTREXATE 25 MG/ML
25 INJECTION, SOLUTION INTRA-ARTERIAL; INTRAMUSCULAR; INTRAVENOUS
Qty: 12 ML | Refills: 5 | Status: SHIPPED | OUTPATIENT
Start: 2021-05-18 | End: 2022-09-29

## 2021-05-18 RX ORDER — PEN NEEDLE, DIABETIC 29 G X1/2"
NEEDLE, DISPOSABLE MISCELLANEOUS
Qty: 12 EACH | Refills: 3 | Status: SHIPPED | OUTPATIENT
Start: 2021-05-18 | End: 2022-11-30

## 2021-05-18 NOTE — NURSING NOTE
Chief Complaint   Patient presents with     RECHECK     follow up         /72 (BP Location: Right arm, Patient Position: Sitting, Cuff Size: Adult Regular)   Pulse 85   Wt 143.6 kg (316 lb 8 oz)   SpO2 94%   BMI 51.08 kg/m        Trey Vidales, EMT

## 2021-05-18 NOTE — LETTER
2021       RE: Shira Rodriguez  [address]  [place] [facility] [place] [address]     Dear Colleague,    Thank you for referring your patient, Shira Rodriguez, to the [facility] RHEUMATOLOGY [facility] [place] [facility]. Please see a copy of my visit note below.    [facility]  Rheumatology Clinic-in person  Everett Austin MD  2021     Name: Shira Rodriguez  MRN: [id number]  Age: 61 year old  : 1960  Referring provider: Anjel Busby     Assessment and Plan:  # Inflammatory Arthritis (+RF/+CCP, dx 2017 with diffuse polyarticular inflammatory flare):   Ms. Rodriguez has few joint complaints with only mild involvement of the bilateral 2nd digits and minimal morning stiffness. No further oral ulcers despite not using leucovorin.  Examination today shows no altered range of motion or synovitis in upper extremity joints.  On 2021 creatinine, transaminases, and CRP were normal.  CBC was normal.  Sed rate was 15.    Seropositive inflammatory arthritis remains well controlled.  I recommend that she continue her current immunomodulatory regimen (methotrexate 25 mg subcutaneous, and folate 3 mg daily).    # Fatigue: The cause of this constitutional symptom is not clear. I do not think that inflammatory arthritis or arthritis medications are the cause.    Plan:  1.  Continue methotrexate 25 mg subcutaneous weekly.  2.  Stop leukovorin  3.  Continue folic acid 3 mg daily.  4.While on methotrexate:   -- Check labs every 3 months (AST/ALT, Albumin, CBC with platelets)   -- Limit alcohol intake to 2 drinks weekly  --Tylenol 500-1000 mg can be used as needed up to three times daily for nausea/headache associated with dosing.    RTC 6 mos    Everett Austin M.D.  Staff Rheumatologist, [facility]  Pager [phone]     Follow-up: Follow up with lab monitoring in 3 months, and in clinic in 6 months.     HPI:   Shira Rodriguez has a  "history of rheumatoid arthritis who presents for follow up. I last saw the patient 9--2020. Seropositive rheumatoid arthritis was well controlled at that time on 25 mg of subcutaneous Methotrexate. She was also encouraged to continue leucovorin 5 mg to relieve any mucosal irritation related to Methotrexate.      Interval history 05-:    Overall joints are doing well.  She has some soreness with flexion of the second and third DIPs in the right hand.  Otherwise there is no swelling, stiffness, redness, or restriction in motion of hand and finger joints.  Knees bother her with prolonged walking, but she looks forward to returning to the pool after the pandemic for aqua exercise.  She believes that the methotrexate has been associated with greater than 80% improvement in joint pain and other symptoms.    Unrelenting fatigue continues.  She notes a new source of exhaustion and that her mother recently had a stroke and the patient is is sharing personal care duties with a sister.    She is remained on methotrexate through the COVID-19 pandemic.  No further oral ulcerations.  She has not been using leucovorin.  She has used folate 3 mg daily.  Interval history 09-:    \"Unbelievable\" fatigue all the time. She sits in her chair \"all day long\" diue to low energy. She does note poor sleep with frequent awakening. She has been trying to use CPAP, but it has not been tolerated. She is awaiting a chance to do another sleep study (after COVID19 abates).  She is tearful today describing her sense of growing frustration at lack of return of her former vigor and energy following recovery from critical illness exactly 1 year ago.    Her joints are doing well. Her wrists hurt in the mornings briefly. Pain improves with a few minutes of moving her wrists. Her finger joints hurt with fist formation. No pain with use of the hands.  No visible swelling, no difficulty with activities of daily living involving the " hands.    Low back and R knee pain, chronic, are unchanged., improved.    She has a chronic low-grade cough. She is no longer smoking at all.    She has lost over 70 lb since 1 year ago, when she was hospitalized with severe pulmonary embolism, and required prolonged ICU treatment with ECMO.    Prior history  2-2020    Today, the patient reports that she has been doing well since her last visit. She states that her oral lesions resolved with  leucovorin and folic acid. She also notes that her joint pain and decreased range of motion is limited to her bilateral second digits in her hands. Her left second finger is more stiff than her right, and she denies locking or ratcheting in either finger. She notes that her morning stiffness typically resolves in 15 to 30 minutes. She has no significant swelling in her feet. She notes that she has very little swelling in her legs after starting lasix. She denies rash, shortness of breath, or raynaud's flares.     The patient states that she had a cough prior to her pulmonary embolism/myocardial infarction. She then developed pneumonia while intubated and required a thoracentesis while in the hospital. She reports that she had a persistent cough after this procedure and repeat ultrasound noted further fluid for which she had another thoracentesis which produced 600 ml of fluid. She notes that her cough is improved after her second thoracentesis but is still present. She states her pulmonologist is concerned her remaining cough could be related to her Methotrexate. She is set up for a repeat PFTs and Chest Xray in the coming months.      Rheumatological history:  Referrred 2/2017 for 3 months of BL hand pain thought consistent with CTS and positive RF/CCP. EMG showed moderate median neuropathy on L and severe on R, MRI c-spine normal. She received R CT injection by sports med with 9 months of relief. She did describe intermittent episodes of swelling of her feet/ankles around  this time period as well that resolved with medrol dose pack. She was re-evaluated 8/2017 after developing polyarticular inflammatory arthritis of the right knee, ankles, wrists, hands, and shoulders. She was started on MTX, given depomedrol IM injection, oral prednisone, and given R knee CS injection. MTX increased and oral prednisone tapered to off in 9/2017. Had repeat R CT injection in 10/2017 that lasted until 1/2018. R knee significantly improved following CS injection 12/2017. Has persistent pain in L heel with enthesopathy on imaging, unclear if had true enthesitis in this region due to her RA. Participated in pool therapy winter 2018 with good response. At her 3/2018 OV she continued to have significant pain in her R wrist thought due to CT, but given some ongoing persistent EMS (BL hand stiffness lasting ALL day) as well and her known seropositive disease I recommended adding Humira to her regimen. This was ordered but she never started. Methotrexate Rx continued through 2021     Review of Systems:   Pertinent items are noted in HPI or as below, remainder of complete ROS is negative.    No fevers/chills/sweats  No recent problems with hearing or vision. No swallowing problems.   No breathing difficulty, shortness of breath, coughing, or wheezing  No chest pain or palpitations  No heart burn, indigestion, abdominal pain, nausea, vomiting, diarrhea  No urination problems, no bloody, cloudy urine, no dysuria  No numbing, tingling, weakness  No headaches or confusion  No rashes. No easy bleeding or bruising.       Active Medications:     Current Outpatient Medications:      acetaminophen (TYLENOL) 325 MG tablet, Take 2 tablets (650 mg) by mouth every 6 hours as needed for mild pain or fever, Disp: , Rfl:      ARIPiprazole (ABILIFY) 5 MG tablet, Take 1 tablet (5 mg) by mouth daily, Disp: 90 tablet, Rfl: 3     atorvastatin (LIPITOR) 40 MG tablet, Take 1 tablet (40 mg) by mouth daily, Disp: 90 tablet, Rfl: 3      "DULoxetine (CYMBALTA) 60 MG capsule, Take 1 capsule (60 mg) by mouth daily, Disp: 30 capsule, Rfl: 0     folic acid (FOLVITE) 1 MG tablet, Take 3-4 tablets (3-4 mg) by mouth daily, Disp: 120 tablet, Rfl: 7     furosemide (LASIX) 40 MG tablet, Take 2 tablets (80 mg) by mouth daily (Patient not taking: Reported on 9/8/2020), Disp: 180 tablet, Rfl: 1     insulin syringe-needle U-100 (BD INSULIN SYRINGE ULTRAFINE) 30G X 1/2\" 1 ML miscellaneous, For Methotrexate use weekly., Disp: 12 each, Rfl: 3     leucovorin (WELLCOVORIN) 5 MG tablet, Take 1 tablet (5 mg) by mouth every 7 days *take 24h after taking weekly methotrexate dose, Disp: 4 tablet, Rfl: 5     lisinopril (ZESTRIL) 5 MG tablet, Take 1 tablet (5 mg) by mouth daily, Disp: 90 tablet, Rfl: 1     methotrexate 50 MG/2ML injection, Inject 1 mL (25 mg) Subcutaneous every 7 days, Disp: 12 mL, Rfl: 1     nystatin (MYCOSTATIN) 632791 UNIT/GM external powder, Apply to coat rash tid, neck and breast area rashes, Disp: 60 g, Rfl: 3     order for DME, Please discontinue all oxygen and collect equipment., Disp: 1 Units, Rfl: 0     potassium chloride ER (MICRO-K) 10 MEQ CR capsule, Take 1 capsule (10 mEq) by mouth 2 times daily, Disp: 180 capsule, Rfl: 3     warfarin ANTICOAGULANT (COUMADIN) 5 MG tablet, TAKE 1.5-2 TABLETS DAILY OR AS DIRECTED BY COUMADIN CLINIC., Disp: 180 tablet, Rfl: 0     zolpidem ER (AMBIEN CR) 6.25 MG CR tablet, Take one tab the night of your sleep study at bedtime in sleep center. (Patient not taking: Reported on 9/8/2020), Disp: 1 tablet, Rfl: 0    Current Facility-Administered Medications:      lidocaine (PF) (XYLOCAINE) 1 % injection 4 mL, 4 mL, , , Ken Modi MD, 4 mL at 12/31/19 1030     triamcinolone (KENALOG-40) injection 40 mg, 40 mg, , , Tashi Saxena MD, 40 mg at 05/04/20 0813     triamcinolone (KENALOG-40) injection 40 mg, 40 mg, , , Ken Modi MD, 40 mg at 12/31/19 1030    Facility-Administered Medications Ordered in Other " Visits:      sodium chloride (PF) 0.9% PF flush 10 mL, 10 mL, Intracatheter, Once, Loy Joshi APRN CNP      Allergies:   Adhesive tape and Erythromycin      Past Medical History:  Depression   Eczema   Hyperlipidemia   Hypertension   Morbid obesity   Osteoarthrosis right knee   Sleep apnea   Tobacco use disorder   Primary localized osteoarthrosis, pelvic region and thigh  Degeneration of cervical intervertebral disc  Chronic bronchitis   Pulmonary embolism   Cardiac arrest; required ECMO 9-2019     Past Surgical History:  Left total knee arthoplasty 6/14/2012   Right hip arthroplasty 07/09/2004      Family History:   Mother: thyroid disease, hypertension, skin cancer, CVA  Paternal grandmother: breast cancer, pancreatic cancer   Father: alcoholism   Sister: thyroid disease, alcoholism   Maternal grandmother: hypertension   Sister: heart disease, multiple ablations   Paternal grandfather: prostate cancer      Social History:   Current everyday cigarette smoker, 1 pack/day, 33 years, started 1982  No smokeless tobacco use  Occasional alcohol use   Physical Exam:   There were no vitals taken for this visit.   Wt Readings from Last 4 Encounters:   09/15/20 (!) 150.1 kg (331 lb)   08/03/20 (!) 163.3 kg (360 lb)   06/12/20 (!) 155.5 kg (342 lb 14.4 oz)   05/04/20 (!) 165.6 kg (365 lb)     Constitutional: Well-developed, appearing stated age; cooperative  Eyes: Normal EOM, PERRLA, vision, conjunctiva, sclera  ENT: Normal external ears, nose, hearing, lips, teeth, gums, throat. No mucous membrane lesions, normal saliva pool  Neck: No mass or thyroid enlargement  MS: Excellent alignment of MCPs and PIPs. No appearance of dactylitis  Skin: No nail pitting, alopecia, nodules or lesions.    Neuro: Normal cranial nerves  Psych: Normal judgement, orientation, memory, affect.     Laboratory:   RHEUM RESULTS Latest Ref Rng & Units 9/4/2020 9/24/2020 1/13/2021   SED RATE 0 - 30 mm/h - - 15   CRP, INFLAMMATION 0.0 -  8.0 mg/L - - 4.3   CK TOTAL 30 - 225 U/L - - -   RHEUMATOID FACTOR <20 IU/mL - - -   EMERY SCREEN BY EIA <1.0 - - -   AST 0 - 45 U/L 19 25 15   ALT 0 - 50 U/L 26 30 23   ALBUMIN 3.4 - 5.0 g/dL - 3.3(L) -   WBC 4.0 - 11.0 10e9/L 8.8 6.8 5.9   RBC 3.8 - 5.2 10e12/L 5.23(H) 4.85 4.84   HGB 11.7 - 15.7 g/dL 15.8(H) 15.0 15.4   HCT 35.0 - 47.0 % 48.5(H) 44.1 45.8   MCV 78 - 100 fl 93 91 95   MCHC 31.5 - 36.5 g/dL 32.6 34.0 33.6   RDW 10.0 - 15.0 % 14.7 15.1(H) 14.3    - 450 10e9/L 313 146(L) 188   CREATININE 0.52 - 1.04 mg/dL 0.76 0.73 0.74   GFR ESTIMATE, IF BLACK >60 mL/min/[1.73:m2] >90 >90 >90   GFR ESTIMATE >60 mL/min/[1.73:m2] 86 >90 89    - 1,620 mg/dL - - -   IGA 70 - 380 mg/dL - - -   IGM 60 - 265 mg/dL - - -   HEPATITIS C ANTIBODY NR - - -       Rheumatoid Factor   Date Value Ref Range Status   08/28/2017 51 (H) <20 IU/mL Final     Cyclic Citrullinated Peptide Antibody, IgG   Date Value Ref Range Status   08/28/2017 15 (H) <7 U/mL Final     Comment:     Positive     EMERY Screen by EIA   Date Value Ref Range Status   12/30/2016 <1.0  Interpretation:  Negative   <1.0 Final     Hepatitis B Core Caron   Date Value Ref Range Status   04/12/2017 Nonreactive NR Final     Hep B Surface Agn   Date Value Ref Range Status   04/12/2017 Nonreactive NR Final     IGG   Date Value Ref Range Status   04/21/2017 940 695 - 1,620 mg/dL Final     IGA   Date Value Ref Range Status   04/21/2017 271 70 - 380 mg/dL Final     IGM   Date Value Ref Range Status   04/21/2017 395 (H) 60 - 265 mg/dL Final     No chief complaint on file.    not currently breastfeeding.        Again, thank you for allowing me to participate in the care of your patient.      Sincerely,    Everett Autsin MD

## 2021-05-18 NOTE — PROGRESS NOTES
[facility]  Rheumatology Clinic-in person  Everett Austin MD  2021     Name: Shira Rodriguez  MRN: [id number]  Age: 61 year old  : 1960  Referring provider: Anjel Busby     Assessment and Plan:  # Inflammatory Arthritis (+RF/+CCP, dx 2017 with diffuse polyarticular inflammatory flare):   Ms. Rodriguez has few joint complaints with only mild involvement of the bilateral 2nd digits and minimal morning stiffness. No further oral ulcers despite not using leucovorin.  Examination today shows no altered range of motion or synovitis in upper extremity joints.  On 2021 creatinine, transaminases, and CRP were normal.  CBC was normal.  Sed rate was 15.    Seropositive inflammatory arthritis remains well controlled.  I recommend that she continue her current immunomodulatory regimen (methotrexate 25 mg subcutaneous, and folate 3 mg daily).    # Fatigue: The cause of this constitutional symptom is not clear. I do not think that inflammatory arthritis or arthritis medications are the cause.    Plan:  1.  Continue methotrexate 25 mg subcutaneous weekly.  2.  Stop leukovorin  3.  Continue folic acid 3 mg daily.  4.While on methotrexate:   -- Check labs every 3 months (AST/ALT, Albumin, CBC with platelets)   -- Limit alcohol intake to 2 drinks weekly  --Tylenol 500-1000 mg can be used as needed up to three times daily for nausea/headache associated with dosing.    RTC 6 mos    Everett Austin M.D.  Staff Rheumatologist, [facility]  Pager [phone]     Follow-up: Follow up with lab monitoring in 3 months, and in clinic in 6 months.     HPI:   Shira Rodriguez has a history of rheumatoid arthritis who presents for follow up. I last saw the patient . Seropositive rheumatoid arthritis was well controlled at that time on 25 mg of subcutaneous Methotrexate. She was also encouraged to continue leucovorin 5 mg to relieve any mucosal irritation related to Methotrexate.      Interval history  "05-:    Overall joints are doing well.  She has some soreness with flexion of the second and third DIPs in the right hand.  Otherwise there is no swelling, stiffness, redness, or restriction in motion of hand and finger joints.  Knees bother her with prolonged walking, but she looks forward to returning to the pool after the pandemic for aqua exercise.  She believes that the methotrexate has been associated with greater than 80% improvement in joint pain and other symptoms.    Unrelenting fatigue continues.  She notes a new source of exhaustion and that her mother recently had a stroke and the patient is is sharing personal care duties with a sister.    She is remained on methotrexate through the COVID-19 pandemic.  No further oral ulcerations.  She has not been using leucovorin.  She has used folate 3 mg daily.  Interval history 09-:    \"Unbelievable\" fatigue all the time. She sits in her chair \"all day long\" diue to low energy. She does note poor sleep with frequent awakening. She has been trying to use CPAP, but it has not been tolerated. She is awaiting a chance to do another sleep study (after COVID19 abates).  She is tearful today describing her sense of growing frustration at lack of return of her former vigor and energy following recovery from critical illness exactly 1 year ago.    Her joints are doing well. Her wrists hurt in the mornings briefly. Pain improves with a few minutes of moving her wrists. Her finger joints hurt with fist formation. No pain with use of the hands.  No visible swelling, no difficulty with activities of daily living involving the hands.    Low back and R knee pain, chronic, are unchanged., improved.    She has a chronic low-grade cough. She is no longer smoking at all.    She has lost over 70 lb since 1 year ago, when she was hospitalized with severe pulmonary embolism, and required prolonged ICU treatment with ECMO.    Prior history  2-2020    Today, the patient " reports that she has been doing well since her last visit. She states that her oral lesions resolved with  leucovorin and folic acid. She also notes that her joint pain and decreased range of motion is limited to her bilateral second digits in her hands. Her left second finger is more stiff than her right, and she denies locking or ratcheting in either finger. She notes that her morning stiffness typically resolves in 15 to 30 minutes. She has no significant swelling in her feet. She notes that she has very little swelling in her legs after starting lasix. She denies rash, shortness of breath, or raynaud's flares.     The patient states that she had a cough prior to her pulmonary embolism/myocardial infarction. She then developed pneumonia while intubated and required a thoracentesis while in the hospital. She reports that she had a persistent cough after this procedure and repeat ultrasound noted further fluid for which she had another thoracentesis which produced 600 ml of fluid. She notes that her cough is improved after her second thoracentesis but is still present. She states her pulmonologist is concerned her remaining cough could be related to her Methotrexate. She is set up for a repeat PFTs and Chest Xray in the coming months.      Rheumatological history:  Referrred 2/2017 for 3 months of BL hand pain thought consistent with CTS and positive RF/CCP. EMG showed moderate median neuropathy on L and severe on R, MRI c-spine normal. She received R CT injection by sports med with 9 months of relief. She did describe intermittent episodes of swelling of her feet/ankles around this time period as well that resolved with medrol dose pack. She was re-evaluated 8/2017 after developing polyarticular inflammatory arthritis of the right knee, ankles, wrists, hands, and shoulders. She was started on MTX, given depomedrol IM injection, oral prednisone, and given R knee CS injection. MTX increased and oral prednisone  tapered to off in 9/2017. Had repeat R CT injection in 10/2017 that lasted until 1/2018. R knee significantly improved following CS injection 12/2017. Has persistent pain in L heel with enthesopathy on imaging, unclear if had true enthesitis in this region due to her RA. Participated in pool therapy winter 2018 with good response. At her 3/2018 OV she continued to have significant pain in her R wrist thought due to CT, but given some ongoing persistent EMS (BL hand stiffness lasting ALL day) as well and her known seropositive disease I recommended adding Humira to her regimen. This was ordered but she never started. Methotrexate Rx continued through 2021     Review of Systems:   Pertinent items are noted in HPI or as below, remainder of complete ROS is negative.    No fevers/chills/sweats  No recent problems with hearing or vision. No swallowing problems.   No breathing difficulty, shortness of breath, coughing, or wheezing  No chest pain or palpitations  No heart burn, indigestion, abdominal pain, nausea, vomiting, diarrhea  No urination problems, no bloody, cloudy urine, no dysuria  No numbing, tingling, weakness  No headaches or confusion  No rashes. No easy bleeding or bruising.       Active Medications:     Current Outpatient Medications:      acetaminophen (TYLENOL) 325 MG tablet, Take 2 tablets (650 mg) by mouth every 6 hours as needed for mild pain or fever, Disp: , Rfl:      ARIPiprazole (ABILIFY) 5 MG tablet, Take 1 tablet (5 mg) by mouth daily, Disp: 90 tablet, Rfl: 3     atorvastatin (LIPITOR) 40 MG tablet, Take 1 tablet (40 mg) by mouth daily, Disp: 90 tablet, Rfl: 3     DULoxetine (CYMBALTA) 60 MG capsule, Take 1 capsule (60 mg) by mouth daily, Disp: 30 capsule, Rfl: 0     folic acid (FOLVITE) 1 MG tablet, Take 3-4 tablets (3-4 mg) by mouth daily, Disp: 120 tablet, Rfl: 7     furosemide (LASIX) 40 MG tablet, Take 2 tablets (80 mg) by mouth daily (Patient not taking: Reported on 9/8/2020), Disp: 180  "tablet, Rfl: 1     insulin syringe-needle U-100 (BD INSULIN SYRINGE ULTRAFINE) 30G X 1/2\" 1 ML miscellaneous, For Methotrexate use weekly., Disp: 12 each, Rfl: 3     leucovorin (WELLCOVORIN) 5 MG tablet, Take 1 tablet (5 mg) by mouth every 7 days *take 24h after taking weekly methotrexate dose, Disp: 4 tablet, Rfl: 5     lisinopril (ZESTRIL) 5 MG tablet, Take 1 tablet (5 mg) by mouth daily, Disp: 90 tablet, Rfl: 1     methotrexate 50 MG/2ML injection, Inject 1 mL (25 mg) Subcutaneous every 7 days, Disp: 12 mL, Rfl: 1     nystatin (MYCOSTATIN) 643281 UNIT/GM external powder, Apply to coat rash tid, neck and breast area rashes, Disp: 60 g, Rfl: 3     order for DME, Please discontinue all oxygen and collect equipment., Disp: 1 Units, Rfl: 0     potassium chloride ER (MICRO-K) 10 MEQ CR capsule, Take 1 capsule (10 mEq) by mouth 2 times daily, Disp: 180 capsule, Rfl: 3     warfarin ANTICOAGULANT (COUMADIN) 5 MG tablet, TAKE 1.5-2 TABLETS DAILY OR AS DIRECTED BY COUMADIN CLINIC., Disp: 180 tablet, Rfl: 0     zolpidem ER (AMBIEN CR) 6.25 MG CR tablet, Take one tab the night of your sleep study at bedtime in sleep center. (Patient not taking: Reported on 9/8/2020), Disp: 1 tablet, Rfl: 0    Current Facility-Administered Medications:      lidocaine (PF) (XYLOCAINE) 1 % injection 4 mL, 4 mL, , , Ken Modi MD, 4 mL at 12/31/19 1030     triamcinolone (KENALOG-40) injection 40 mg, 40 mg, , , Tashi Saxena MD, 40 mg at 05/04/20 0813     triamcinolone (KENALOG-40) injection 40 mg, 40 mg, , , Ken Modi MD, 40 mg at 12/31/19 1030    Facility-Administered Medications Ordered in Other Visits:      sodium chloride (PF) 0.9% PF flush 10 mL, 10 mL, Intracatheter, Once, Loy Joshi APRN CNP      Allergies:   Adhesive tape and Erythromycin      Past Medical History:  Depression   Eczema   Hyperlipidemia   Hypertension   Morbid obesity   Osteoarthrosis right knee   Sleep apnea   Tobacco use disorder   Primary " localized osteoarthrosis, pelvic region and thigh  Degeneration of cervical intervertebral disc  Chronic bronchitis   Pulmonary embolism   Cardiac arrest; required ECMO 9-2019     Past Surgical History:  Left total knee arthoplasty 6/14/2012   Right hip arthroplasty 07/09/2004      Family History:   Mother: thyroid disease, hypertension, skin cancer, CVA  Paternal grandmother: breast cancer, pancreatic cancer   Father: alcoholism   Sister: thyroid disease, alcoholism   Maternal grandmother: hypertension   Sister: heart disease, multiple ablations   Paternal grandfather: prostate cancer      Social History:   Current everyday cigarette smoker, 1 pack/day, 33 years, started 1982  No smokeless tobacco use  Occasional alcohol use   Physical Exam:   There were no vitals taken for this visit.   Wt Readings from Last 4 Encounters:   09/15/20 (!) 150.1 kg (331 lb)   08/03/20 (!) 163.3 kg (360 lb)   06/12/20 (!) 155.5 kg (342 lb 14.4 oz)   05/04/20 (!) 165.6 kg (365 lb)     Constitutional: Well-developed, appearing stated age; cooperative  Eyes: Normal EOM, PERRLA, vision, conjunctiva, sclera  ENT: Normal external ears, nose, hearing, lips, teeth, gums, throat. No mucous membrane lesions, normal saliva pool  Neck: No mass or thyroid enlargement  MS: Excellent alignment of MCPs and PIPs. No appearance of dactylitis  Skin: No nail pitting, alopecia, nodules or lesions.    Neuro: Normal cranial nerves  Psych: Normal judgement, orientation, memory, affect.     Laboratory:   RHEUM RESULTS Latest Ref Rng & Units 9/4/2020 9/24/2020 1/13/2021   SED RATE 0 - 30 mm/h - - 15   CRP, INFLAMMATION 0.0 - 8.0 mg/L - - 4.3   CK TOTAL 30 - 225 U/L - - -   RHEUMATOID FACTOR <20 IU/mL - - -   EMERY SCREEN BY EIA <1.0 - - -   AST 0 - 45 U/L 19 25 15   ALT 0 - 50 U/L 26 30 23   ALBUMIN 3.4 - 5.0 g/dL - 3.3(L) -   WBC 4.0 - 11.0 10e9/L 8.8 6.8 5.9   RBC 3.8 - 5.2 10e12/L 5.23(H) 4.85 4.84   HGB 11.7 - 15.7 g/dL 15.8(H) 15.0 15.4   HCT 35.0 - 47.0 %  48.5(H) 44.1 45.8   MCV 78 - 100 fl 93 91 95   MCHC 31.5 - 36.5 g/dL 32.6 34.0 33.6   RDW 10.0 - 15.0 % 14.7 15.1(H) 14.3    - 450 10e9/L 313 146(L) 188   CREATININE 0.52 - 1.04 mg/dL 0.76 0.73 0.74   GFR ESTIMATE, IF BLACK >60 mL/min/[1.73:m2] >90 >90 >90   GFR ESTIMATE >60 mL/min/[1.73:m2] 86 >90 89    - 1,620 mg/dL - - -   IGA 70 - 380 mg/dL - - -   IGM 60 - 265 mg/dL - - -   HEPATITIS C ANTIBODY NR - - -       Rheumatoid Factor   Date Value Ref Range Status   08/28/2017 51 (H) <20 IU/mL Final     Cyclic Citrullinated Peptide Antibody, IgG   Date Value Ref Range Status   08/28/2017 15 (H) <7 U/mL Final     Comment:     Positive     EMERY Screen by EIA   Date Value Ref Range Status   12/30/2016 <1.0  Interpretation:  Negative   <1.0 Final     Hepatitis B Core Caron   Date Value Ref Range Status   04/12/2017 Nonreactive NR Final     Hep B Surface Agn   Date Value Ref Range Status   04/12/2017 Nonreactive NR Final     IGG   Date Value Ref Range Status   04/21/2017 940 695 - 1,620 mg/dL Final     IGA   Date Value Ref Range Status   04/21/2017 271 70 - 380 mg/dL Final     IGM   Date Value Ref Range Status   04/21/2017 395 (H) 60 - 265 mg/dL Final     No chief complaint on file.    not currently breastfeeding.

## 2021-05-18 NOTE — PATIENT INSTRUCTIONS
Diagnosis:  1.  Inflammatory arthritis: Control of joint pain that formerly disrupted activities of daily living is markedly improved with continued use of methotrexate.  2.  Persistent significant fatigue: This may be multifactorial, with contributions from disrupted sleep, depression, and potentially medications.  I recommend continuing to expand aerobic exercise.    Plan:  1.  Continue methotrexate 25 mg subcutaneous weekly.  2.  Stop leukovorin  3.  Continue folic acid 3 mg daily.  4.While on methotrexate:   -- Check labs every 3 months (AST/ALT, Albumin, CBC with platelets)   -- Limit alcohol intake to 2 drinks weekly  --Tylenol 500-1000 mg can be used as needed up to three times daily for nausea/headache associated with dosing.

## 2021-05-18 NOTE — PROGRESS NOTES
"ANTICOAGULATION FOLLOW-UP CLINIC VISIT    Patient Name:  Shira Rodriguez  Date:  2021  Contact Type:  Telephone    SUBJECTIVE:  Patient Findings     Positives:  Other complaints    Comments:  Pt reports she has been taking her Warfarin differently and has been taking it as 7.5mg MF and 5mg TuWThSaSu. Pt reports she has been taking her Warfarin as so \"awhile ago\" and couldn't tell writer when this was. Updated the calendar and pt denies any other changes.         Clinical Outcomes     Comments:  Pt reports she has been taking her Warfarin differently and has been taking it as 7.5mg MF and 5mg TuWThSaSu. Pt reports she has been taking her Warfarin as so \"awhile ago\" and couldn't tell writer when this was. Updated the calendar and pt denies any other changes.            OBJECTIVE    Recent labs: (last 7 days)     21  1201   INR 2.84*       ASSESSMENT / PLAN  INR assessment THER    Recheck INR In: 3 WEEKS    INR Location Clinic      Anticoagulation Summary  As of 2021    INR goal:  2.0-3.0   TTR:  46.3 % (1 y)   INR used for dosin.84 (2021)   Warfarin maintenance plan:  7.5 mg (5 mg x 1.5) every Mon, Fri; 5 mg (5 mg x 1) all other days   Full warfarin instructions:  7.5 mg every Mon, Fri; 5 mg all other days   Weekly warfarin total:  40 mg   Plan last modified:  Patience Martins, RN (2021)   Next INR check:  2021   Priority:  Maintenance   Target end date:  Indefinite    Indications    Pulmonary emboli (H) [I26.99]  Long term current use of anticoagulant therapy [Z79.01]             Anticoagulation Episode Summary     INR check location:      Preferred lab:      Send INR reminders to:  [facility]    Comments:  HIPPA Forms returned 19  OK to leave messages on Cell  [phone]      Anticoagulation Care Providers     Provider Role Specialty Phone number    Anjel Busby MD Referring Family Medicine [phone]            See the Encounter Report to view " Anticoagulation Flowsheet and Dosing Calendar (Go to Encounters tab in chart review, and find the Anticoagulation Therapy Visit)    Spoke with patient. Gave them their lab results and new warfarin recommendation.  No changes in health, medication, or diet. No missed doses, no falls. No signs or symptoms of bleed or clotting.     Patience Martins RN

## 2021-06-08 NOTE — PATIENT INSTRUCTIONS
[facility] Medication Refill Request Information:  * Please contact your pharmacy regarding ANY request for medication refills.  ** [facility] Prescription Fax = [phone]  * Please allow 3 business days for routine medication refills.  * Please allow 5 business days for controlled substance medication refills.     [facility] Test Result notification information:  *You will be notified with in 7-10 days of your appointment day regarding the results of your test.  If you are on MyChart you will be notified as soon as the provider has reviewed the results and signed off on them.    [facility] Phone Number [phone]    [facility] [phone] ([facility]th [facility]        Clothing

## 2021-06-10 DIAGNOSIS — F43.21 ADJUSTMENT DISORDER WITH DEPRESSED MOOD: Primary | ICD-10-CM

## 2021-06-11 DIAGNOSIS — F43.21 ADJUSTMENT DISORDER WITH DEPRESSED MOOD: Chronic | ICD-10-CM

## 2021-06-11 RX ORDER — ARIPIPRAZOLE 5 MG/1
5 TABLET ORAL DAILY
Qty: 90 TABLET | Refills: 3 | Status: CANCELLED | OUTPATIENT
Start: 2021-06-11

## 2021-06-13 ASSESSMENT — ENCOUNTER SYMPTOMS
JAUNDICE: 0
WHEEZING: 0
STIFFNESS: 1
NECK PAIN: 0
BACK PAIN: 1
HEMOPTYSIS: 0
CONSTIPATION: 1
INSOMNIA: 1
BLOOD IN STOOL: 0
JOINT SWELLING: 0
BLOATING: 0
HEARTBURN: 0
ABDOMINAL PAIN: 0
FEVER: 0
DECREASED CONCENTRATION: 0
SHORTNESS OF BREATH: 0
SPUTUM PRODUCTION: 1
NAUSEA: 0
VOMITING: 0
ALTERED TEMPERATURE REGULATION: 0
MUSCLE WEAKNESS: 0
RECTAL PAIN: 0
HALLUCINATIONS: 0
PANIC: 0
CHILLS: 0
BOWEL INCONTINENCE: 0
POSTURAL DYSPNEA: 1
MUSCLE CRAMPS: 0
SWOLLEN GLANDS: 0
POLYDIPSIA: 0
INCREASED ENERGY: 0
DIARRHEA: 0
DYSPNEA ON EXERTION: 1
DEPRESSION: 1
WEIGHT GAIN: 0
COUGH: 1
WEIGHT LOSS: 1
NIGHT SWEATS: 0
MYALGIAS: 1
FATIGUE: 1
ARTHRALGIAS: 1
DECREASED APPETITE: 0
BRUISES/BLEEDS EASILY: 1
NERVOUS/ANXIOUS: 1
COUGH DISTURBING SLEEP: 1
POLYPHAGIA: 0

## 2021-06-14 RX ORDER — ARIPIPRAZOLE 10 MG/1
10 TABLET ORAL DAILY
Qty: 90 TABLET | Refills: 1 | Status: SHIPPED | OUTPATIENT
Start: 2021-06-14 | End: 2023-05-24

## 2021-06-14 NOTE — TELEPHONE ENCOUNTER
ARIPIPRAZOLE 10MG TABLETS      Last Written Prescription Date:  11/20/19  Last Fill Quantity: 90,   # refills: 3  Last Office Visit : 6/12/20  Future Office visit:  6/16/21    Routing refill request to provider for review/approval because:  Gap in therapy?  How taking?

## 2021-06-16 ENCOUNTER — OFFICE VISIT (OUTPATIENT)
Dept: FAMILY MEDICINE | Facility: CLINIC | Age: 61
End: 2021-06-16
Payer: COMMERCIAL

## 2021-06-16 ENCOUNTER — ANTICOAGULATION THERAPY VISIT (OUTPATIENT)
Dept: ANTICOAGULATION | Facility: CLINIC | Age: 61
End: 2021-06-16

## 2021-06-16 VITALS
BODY MASS INDEX: 49.86 KG/M2 | RESPIRATION RATE: 20 BRPM | OXYGEN SATURATION: 94 % | DIASTOLIC BLOOD PRESSURE: 83 MMHG | SYSTOLIC BLOOD PRESSURE: 131 MMHG | HEART RATE: 83 BPM | WEIGHT: 293 LBS

## 2021-06-16 DIAGNOSIS — Z79.01 LONG TERM CURRENT USE OF ANTICOAGULANT THERAPY: ICD-10-CM

## 2021-06-16 DIAGNOSIS — M54.9 CHRONIC BACK PAIN GREATER THAN 3 MONTHS DURATION: ICD-10-CM

## 2021-06-16 DIAGNOSIS — G89.29 CHRONIC BACK PAIN GREATER THAN 3 MONTHS DURATION: ICD-10-CM

## 2021-06-16 DIAGNOSIS — E78.5 HYPERLIPIDEMIA LDL GOAL <100: ICD-10-CM

## 2021-06-16 DIAGNOSIS — I26.99 PULMONARY EMBOLI (H): ICD-10-CM

## 2021-06-16 DIAGNOSIS — Z78.0 ASYMPTOMATIC POSTMENOPAUSAL STATUS: ICD-10-CM

## 2021-06-16 DIAGNOSIS — R05.9 COUGH: ICD-10-CM

## 2021-06-16 DIAGNOSIS — E04.9 ENLARGED THYROID: ICD-10-CM

## 2021-06-16 DIAGNOSIS — K59.00 CONSTIPATION, UNSPECIFIED CONSTIPATION TYPE: ICD-10-CM

## 2021-06-16 DIAGNOSIS — R53.83 FATIGUE, UNSPECIFIED TYPE: ICD-10-CM

## 2021-06-16 DIAGNOSIS — Z00.00 HEALTH CARE MAINTENANCE: Primary | ICD-10-CM

## 2021-06-16 LAB
ANION GAP SERPL CALCULATED.3IONS-SCNC: 6 MMOL/L (ref 3–14)
BUN SERPL-MCNC: 14 MG/DL (ref 7–30)
CALCIUM SERPL-MCNC: 9.1 MG/DL (ref 8.5–10.1)
CHLORIDE SERPL-SCNC: 111 MMOL/L (ref 94–109)
CHOLEST SERPL-MCNC: 188 MG/DL
CO2 SERPL-SCNC: 25 MMOL/L (ref 20–32)
CORTIS SERPL-MCNC: 6 UG/DL (ref 4–22)
CREAT SERPL-MCNC: 0.74 MG/DL (ref 0.52–1.04)
GFR SERPL CREATININE-BSD FRML MDRD: 89 ML/MIN/{1.73_M2}
GLUCOSE SERPL-MCNC: 93 MG/DL (ref 70–99)
HDLC SERPL-MCNC: 62 MG/DL
INR PPP: 1.88 (ref 0.86–1.14)
LDLC SERPL CALC-MCNC: 101 MG/DL
NONHDLC SERPL-MCNC: 127 MG/DL
POTASSIUM SERPL-SCNC: 4 MMOL/L (ref 3.4–5.3)
SODIUM SERPL-SCNC: 142 MMOL/L (ref 133–144)
TRIGL SERPL-MCNC: 127 MG/DL
TSH SERPL DL<=0.005 MIU/L-ACNC: 1.43 MU/L (ref 0.4–4)

## 2021-06-16 PROCEDURE — 36415 COLL VENOUS BLD VENIPUNCTURE: CPT | Performed by: PATHOLOGY

## 2021-06-16 PROCEDURE — 82533 TOTAL CORTISOL: CPT | Mod: 90 | Performed by: PATHOLOGY

## 2021-06-16 PROCEDURE — 99396 PREV VISIT EST AGE 40-64: CPT | Performed by: FAMILY MEDICINE

## 2021-06-16 PROCEDURE — 84443 ASSAY THYROID STIM HORMONE: CPT | Performed by: PATHOLOGY

## 2021-06-16 PROCEDURE — 99000 SPECIMEN HANDLING OFFICE-LAB: CPT | Performed by: PATHOLOGY

## 2021-06-16 PROCEDURE — 80061 LIPID PANEL: CPT | Performed by: PATHOLOGY

## 2021-06-16 PROCEDURE — 80048 BASIC METABOLIC PNL TOTAL CA: CPT | Performed by: PATHOLOGY

## 2021-06-16 PROCEDURE — 85610 PROTHROMBIN TIME: CPT | Performed by: PATHOLOGY

## 2021-06-16 RX ORDER — METHYLPREDNISOLONE 4 MG
TABLET, DOSE PACK ORAL
Qty: 21 TABLET | Refills: 0 | Status: SHIPPED | OUTPATIENT
Start: 2021-06-16 | End: 2022-05-24

## 2021-06-16 ASSESSMENT — PAIN SCALES - GENERAL: PAINLEVEL: MODERATE PAIN (5)

## 2021-06-16 NOTE — NURSING NOTE
Chief Complaint   Patient presents with     Physical       Oral Carreon CMA (AAMA) at 2:50 PM on 6/16/2021

## 2021-06-16 NOTE — PROGRESS NOTES
ANTICOAGULATION FOLLOW-UP CLINIC VISIT    Patient Name:  Shira Rodriguez  Date:  2021  Contact Type:  Telephone    SUBJECTIVE:         OBJECTIVE    Recent labs: (last 7 days)     21  1603   INR 1.88*       ASSESSMENT / PLAN  No question data found.  Anticoagulation Summary  As of 2021    INR goal:  2.0-3.0   TTR:  48.6 % (1 y)   INR used for dosin.88 (2021)   Warfarin maintenance plan:  7.5 mg (5 mg x 1.5) every Mon, Fri; 5 mg (5 mg x 1) all other days   Full warfarin instructions:  : 7.5 mg; Otherwise 7.5 mg every Mon, Fri; 5 mg all other days   Weekly warfarin total:  40 mg   Plan last modified:  Patience Martins RN (2021)   Next INR check:  2021   Priority:  Maintenance   Target end date:  Indefinite    Indications    Pulmonary emboli (H) [I26.99]  Long term current use of anticoagulant therapy [Z79.01]             Anticoagulation Episode Summary     INR check location:      Preferred lab:      Send INR reminders to:  [facility]    Comments:  HIPPA Forms returned 19  OK to leave messages on Cell  [phone]      Anticoagulation Care Providers     Provider Role Specialty Phone number    Anjel Busby MD Referring Family Medicine [phone]            See the Encounter Report to view Anticoagulation Flowsheet and Dosing Calendar (Go to Encounters tab in chart review, and find the Anticoagulation Therapy Visit)    Spoke with patient.     Mariah Sellers RN

## 2021-06-16 NOTE — PROGRESS NOTES
"    Assessment & Plan     Health care maintenance  Due  - Mammogram, routine screening; Future  - Dexa hip/pelvis/spine*; Future  - GASTROENTEROLOGY ADULT REF PROCEDURE ONLY; Future  See Evelyn Can October pap  Asymptomatic postmenopausal status  Dexa    Hyperlipidemia LDL goal <100  Due  - Lipid panel reflex to direct LDL Fasting; Future  - Basic metabolic panel; Future    Constipation, unspecified constipation type    - TSH with free T4 reflex; Future    Fatigue, unspecified type  Not on steroids now  - Cortisol; Future    Cough  If not better re eval  - methylPREDNISolone (MEDROL DOSEPAK) 4 MG tablet therapy pack; Follow Package Directions  - XR Chest 2 Views; Future    Enlarged thyroid  FH thyroid  - US Thyroid; Future    Chronic back pain greater than 3 months duration  Follow-up Dr Saxena  - Orthopedic  Referral; Future      42 minutes spent on the date of the encounter doing chart review, history and exam, documentation and further activities per the note    BMI:   Estimated body mass index is 49.86 kg/m  as calculated from the following:    Height as of 9/15/20: 1.676 m (5' 6\").    Weight as of this encounter: 140.1 kg (308 lb 14.4 oz).     Return in about 1 month (around 7/16/2021).    Anjel Busby MD  [facility] [place]    Jacky Silva is a 61 year old who presents for the following health issues     HPI Main c/o hypersomnolence. Cannot tolerate CPAP, she thinks due to cough, cough over a year, even when did PFT/pulm visit last Sept she thinks. No fever. Very easy fatigue w/ mild exertion.   Due for mammogram, dexa, hcm labs.   Constipated, will check tsh, start Miralax, cbc, get baseline colonoscopy never catalina one. Stools about weekly.   Was seeing Dr Saxena for back pain which limits her actiity before covid, open to resuming that.  Mom ill, dementia, stress  Past Medical History:   Diagnosis Date     Chronic bronchitis (H) 07/30/2015     " "Contact dermatitis      Depressive disorder 1985     Eczema     HANDS     Elevated liver enzymes      H/O total knee replacement, left      History of total hip replacement 10/27/2011     Hyperlipidemia      Hypertension      Morbid obesity (H)      Osteoarthrosis     right knee     Sleep apnea      Tobacco use disorder      Past Surgical History:   Procedure Laterality Date     ARTHROPLASTY KNEE  6/14/2012    Procedure: ARTHROPLASTY KNEE;  Left Total Knee Arthroplasty;  Surgeon: Annette Quesada MD;  Location: [facility] OR     ARTHROSCOPY HIP Right      BRONCHOSCOPY FLEXIBLE AND RIGID N/A 9/17/2019    Procedure: Bronchoscopy flexible;  Surgeon: Patrick Rayo MD;  Location: [facility] OR     C TOTAL HIP ARTHROPLASTY  07/09/04    RT     CV EXTRACORPERAL MEMBRANE OXYGENATION N/A 9/9/2019    Procedure: Extracorporeal Membrance Oxygenation;  Surgeon: Kaleb Mcfarlane MD;  Location: [facility]     INSERT EXTRACORPORAL MEMBRANE OXYGENATOR N/A 9/17/2019    Procedure: Venous-Venous cannulation using ultrasound guidance and transesophageal echocardiogram, venous-arterial ecmo decannulation and repair of left femoral artery;  Surgeon: Patrick Rayo MD;  Location: [facility] OR     IR MECH THROMB PRIM ART, NON-INTRACRNL  9/10/2019     IR PULMONARY ANGIOGRAM BILATERAL  9/10/2019     IR THROMBOLYSIS ARTERIAL INFUSION INITIAL DAY  9/10/2019     THORACENTESIS N/A 1/16/2020    Procedure: THORACENTESIS;  Surgeon: Georgina Richardson MD;  Location: [facility] GI     Current Outpatient Medications   Medication     acetaminophen (TYLENOL) 325 MG tablet     ARIPiprazole (ABILIFY) 10 MG tablet     ARIPiprazole (ABILIFY) 5 MG tablet     atorvastatin (LIPITOR) 40 MG tablet     DULoxetine (CYMBALTA) 60 MG capsule     folic acid (FOLVITE) 1 MG tablet     insulin syringe-needle U-100 (BD INSULIN SYRINGE ULTRAFINE) 30G X 1/2\" 1 ML miscellaneous     lisinopril (ZESTRIL) 5 MG tablet     methotrexate 50 MG/2ML injection     " methylPREDNISolone (MEDROL DOSEPAK) 4 MG tablet therapy pack     nystatin (MYCOSTATIN) 092507 UNIT/GM external powder     order for DME     warfarin ANTICOAGULANT (COUMADIN) 5 MG tablet     No current facility-administered medications for this visit.      Facility-Administered Medications Ordered in Other Visits   Medication     sodium chloride (PF) 0.9% PF flush 10 mL     Allergies   Allergen Reactions     Adhesive Tape Blisters     Erythromycin Rash     Family History   Problem Relation Age of Onset     Thyroid Disease Mother      Hypertension Mother      Skin Cancer Mother         MMohs surgery with skin graft     Cerebrovascular Disease Mother         CVA after endarderectomy     Diabetes Mother      Breast Cancer Paternal Grandmother      Pancreatic Cancer Paternal Grandmother      Cardiovascular Paternal Aunt      Cardiovascular Paternal Uncle      Depression Other      Alcoholism Father      Heart Disease Father      Thyroid Disease Sister      Alcoholism Sister      Hypertension Maternal Grandmother      Heart Disease Sister         multiple ablations     Alcoholism Sister      Prostate Cancer Paternal Grandfather      Social History     Socioeconomic History     Marital status: Single     Spouse name: Not on file     Number of children: Not on file     Years of education: Not on file     Highest education level: Not on file   Occupational History     Occupation: registered nurse     Comment: [employer]   Social Needs     Financial resource strain: Not on file     Food insecurity     Worry: Not on file     Inability: Not on file     Transportation needs     Medical: Not on file     Non-medical: Not on file   Tobacco Use     Smoking status: Former Smoker     Packs/day: 1.00     Years: 33.00     Pack years: 33.00     Types: Cigarettes     Start date: 1982     Quit date: 2019     Years since quittin.7     Smokeless tobacco: Never Used   Substance and Sexual Activity     Alcohol use: Yes      Alcohol/week: 0.0 standard drinks     Comment: occassional     Drug use: No     Sexual activity: Never   Lifestyle     Physical activity     Days per week: Not on file     Minutes per session: Not on file     Stress: Not on file   Relationships     Social connections     Talks on phone: Not on file     Gets together: Not on file     Attends Latter day service: Not on file     Active member of club or organization: Not on file     Attends meetings of clubs or organizations: Not on file     Relationship status: Not on file     Intimate partner violence     Fear of current or ex partner: Not on file     Emotionally abused: Not on file     Physically abused: Not on file     Forced sexual activity: Not on file   Other Topics Concern     Parent/sibling w/ CABG, MI or angioplasty before 65F 55M? Not Asked   Social History Narrative     Not on file               Review of Systems   Answers for HPI/ROS submitted by the patient on 6/13/2021   General Symptoms: Yes  Skin Symptoms: No  HENT Symptoms: No  EYE SYMPTOMS: No  HEART SYMPTOMS: No  LUNG SYMPTOMS: Yes  INTESTINAL SYMPTOMS: Yes  URINARY SYMPTOMS: No  GYNECOLOGIC SYMPTOMS: No  BREAST SYMPTOMS: No  SKELETAL SYMPTOMS: Yes  BLOOD SYMPTOMS: Yes  NERVOUS SYSTEM SYMPTOMS: No  MENTAL HEALTH SYMPTOMS: Yes  Fever: No  Loss of appetite: No  Weight loss: Yes  Weight gain: No  Fatigue: Yes  Night sweats: No  Chills: No  Increased stress: Yes  Excessive hunger: No  Excessive thirst: No  Feeling hot or cold when others believe the temperature is normal: No  Loss of height: No  Post-operative complications: No  Surgical site pain: No  Hallucinations: No  Change in or Loss of Energy: No  Hyperactivity: No  Confusion: No  Cough: Yes  Sputum or phlegm: Yes  Coughing up blood: No  Difficulty breating or shortness of breath: No  Wheezing: No  Difficulty breathing on exertion: Yes  Nighttime Cough: Yes  Difficulty breathing when lying flat: Yes  Heart burn or indigestion: No  Nausea:  No  Vomiting: No  Abdominal pain: No  Bloating: No  Constipation: Yes  Diarrhea: No  Blood in stool: No  Black stools: No  Rectal or Anal pain: No  Fecal incontinence: No  Yellowing of skin or eyes: No  Vomit with blood: No  Change in stools: No  Back pain: Yes  Muscle aches: Yes  Neck pain: No  Swollen joints: No  Joint pain: Yes  Bone pain: Yes  Muscle cramps: No  Muscle weakness: No  Joint stiffness: Yes  Bone fracture: No  Anemia: No  Swollen glands: No  Easy bleeding or bruising: Yes  Edema or swelling: No  Nervous or Anxious: Yes  Depression: Yes  Trouble sleeping: Yes  Trouble thinking or concentrating: No  Mood changes: No  Panic attacks: No        Objective    /83   Pulse 83   Resp 20   Wt 140.1 kg (308 lb 14.4 oz)   SpO2 94%   Breastfeeding No   BMI 49.86 kg/m    Body mass index is 49.86 kg/m .  Physical Exam   GENERAL: healthy, alert and no distress  EYES: Eyes grossly normal to inspection, PERRL and conjunctivae and sclerae normal  HENT: ear canals and TM's normal, nose and mouth without ulcers or lesions  NECK: no adenopathy, no asymmetry, masses, or scars and thyroid enlarged to palpation  RESP: lungs clear to auscultation - no rales, rhonchi or wheezes  BREAST: normal without masses, tenderness or nipple discharge and no palpable axillary masses or adenopathy  CV: regular rate and rhythm, normal S1 S2, no S3 or S4, no murmur, click or rub, no peripheral edema and peripheral pulses strong  ABDOMEN: soft, nontender, no hepatosplenomegaly, no masses and bowel sounds normal  MS: no gross musculoskeletal defects noted, no edema  SKIN: no suspicious lesions or rashes  NEURO: Normal strength and tone, mentation intact and speech normal  PSYCH: mentation appears normal, affect normal/bright

## 2021-06-18 ENCOUNTER — ANCILLARY PROCEDURE (OUTPATIENT)
Dept: BONE DENSITY | Facility: CLINIC | Age: 61
End: 2021-06-18
Attending: FAMILY MEDICINE
Payer: COMMERCIAL

## 2021-06-18 ENCOUNTER — ANCILLARY PROCEDURE (OUTPATIENT)
Dept: ULTRASOUND IMAGING | Facility: CLINIC | Age: 61
End: 2021-06-18
Attending: FAMILY MEDICINE
Payer: COMMERCIAL

## 2021-06-18 ENCOUNTER — ANCILLARY PROCEDURE (OUTPATIENT)
Dept: MAMMOGRAPHY | Facility: CLINIC | Age: 61
End: 2021-06-18
Attending: FAMILY MEDICINE
Payer: COMMERCIAL

## 2021-06-18 ENCOUNTER — ANCILLARY PROCEDURE (OUTPATIENT)
Dept: GENERAL RADIOLOGY | Facility: CLINIC | Age: 61
End: 2021-06-18
Attending: FAMILY MEDICINE
Payer: COMMERCIAL

## 2021-06-18 DIAGNOSIS — R05.9 COUGH: ICD-10-CM

## 2021-06-18 DIAGNOSIS — Z00.00 HEALTH CARE MAINTENANCE: ICD-10-CM

## 2021-06-18 DIAGNOSIS — E04.9 ENLARGED THYROID: ICD-10-CM

## 2021-06-18 DIAGNOSIS — Z12.31 VISIT FOR SCREENING MAMMOGRAM: ICD-10-CM

## 2021-06-18 PROCEDURE — 77080 DXA BONE DENSITY AXIAL: CPT

## 2021-06-18 PROCEDURE — 77067 SCR MAMMO BI INCL CAD: CPT | Mod: GC

## 2021-06-18 PROCEDURE — 71046 X-RAY EXAM CHEST 2 VIEWS: CPT | Mod: GC | Performed by: RADIOLOGY

## 2021-06-18 PROCEDURE — 77063 BREAST TOMOSYNTHESIS BI: CPT | Mod: GC

## 2021-06-18 PROCEDURE — 76536 US EXAM OF HEAD AND NECK: CPT | Performed by: RADIOLOGY

## 2021-06-23 ENCOUNTER — TELEPHONE (OUTPATIENT)
Dept: GASTROENTEROLOGY | Facility: CLINIC | Age: 61
End: 2021-06-23

## 2021-06-23 DIAGNOSIS — Z11.59 ENCOUNTER FOR SCREENING FOR OTHER VIRAL DISEASES: ICD-10-CM

## 2021-06-23 NOTE — TELEPHONE ENCOUNTER
Screening Questions  1. What insurance is in the chart? [employer]     2.  Ordering/Referring Provider: Anjel Busby MD    3. BMI 50.0    4. Are you on daily home oxygen? N     5. Do you have a history of difficult airway? N    6. Have you had a heart, lung, or liver transplant? N    7. Are you currently on dialysis? N    8. Have you had a stroke or Transient ischemic atttack (TIA) within 6 months? N    9. In the past 6 months, have you had any heart related issues including cardiomyopathy or heart attack?         If yes, did it require cardiac stenting or other implantable device?N    10. Do you have any implantable devices in your body (pacemaker, defib, LVAD)? N    11. Do you take nitroglycerin? If yes, how often? N    12. Are you currently taking any blood thinners? YES/ Warfarin    13. Are you a diabetic? N    14. (Females) Are you currently pregnant? N  If yes, how many weeks?    15. Have you had a procedure in the past that was difficult to tolerate with conscious sedation? Any allergies to Fentanyl or Versed N    16. Are you taking any scheduled prescription narcotics more than once daily? N    17. Do you have any chemical dependencies such as alcohol, street drugs, or methadone? N    18. Do you have any history of post-traumatic stress syndrome or mental health issues? N    19. Do you transfer independently? Y    20.  Do you have any issues with constipation? Y    21. Preferred Pharmacy for Pre Prescription On Chart/ [facility] [place]     Scheduling Details    Procedure Scheduled: Colonoscopy   Provider/Surgeon: Jun   Date of Procedure: 07/16/2021  Location: [facility]  Caller (Please ask for phone number if not scheduled by patient): Shira Rodriguez/Self      Sedation Type: CS  Conscious Sedation- Needs  for 6 hours after the procedure  MAC/General-Needs  for 24 hours after procedure    Pre-op Required at [facility] and OR for  MAC sedation:   (if yes advise patient they will need a  pre-op prior to procedure)      Is patient on blood thinners? -N (If yes- inform patient to follow up with PCP or provider for follow up instructions)     Informed patient they will need an adult  Y  Cannot take any type of public or medical transportation alone    Informed Patient of COVID Test Requirement Y    Confirmed Nurse will call to complete assessment Y    Additional comments: N   tylenol 2 tabs

## 2021-07-07 ENCOUNTER — TELEPHONE (OUTPATIENT)
Dept: FAMILY MEDICINE | Facility: CLINIC | Age: 61
End: 2021-07-07

## 2021-07-07 NOTE — TELEPHONE ENCOUNTER
ANTICOAGULATION     Shira Rodriguez is overdue for INR check.      Left message for patient to call and schedule lab appointment as soon as possible. If returning call, please schedule.     Camila Guerin

## 2021-07-09 ENCOUNTER — TELEPHONE (OUTPATIENT)
Dept: FAMILY MEDICINE | Facility: CLINIC | Age: 61
End: 2021-07-09

## 2021-07-09 ENCOUNTER — TELEPHONE (OUTPATIENT)
Dept: GASTROENTEROLOGY | Facility: CLINIC | Age: 61
End: 2021-07-09

## 2021-07-09 DIAGNOSIS — Z12.11 SPECIAL SCREENING FOR MALIGNANT NEOPLASMS, COLON: Primary | ICD-10-CM

## 2021-07-09 RX ORDER — BISACODYL 5 MG
TABLET, DELAYED RELEASE (ENTERIC COATED) ORAL
Qty: 2 TABLET | Refills: 0 | Status: SHIPPED | OUTPATIENT
Start: 2021-07-09 | End: 2022-05-24

## 2021-07-09 NOTE — TELEPHONE ENCOUNTER
Pre assessment questions completed for upcoming colonoscopy procedure scheduled on 7/16/21    COVID test scheduled 7/13/21    Extended prep script sent to [id number] - [place], [place] - [address] AT [address]. Prep instructions sent via [employer]. (BMI 49)    Reviewed Extended prep instructions with patient.     Patient is on warfarin. Patient instructed to consult with provider regarding holding prior to procedure.     Procedural arrival time and facility location reviewed.    Designated  policy reviewed.    Patient verbalized understanding and had no questions or concerns at this time.    Brittany Michaels RN

## 2021-07-09 NOTE — TELEPHONE ENCOUNTER
[facility] Call Center    Phone Message    May a detailed message be left on voicemail: yes     Reason for Call: Medication Question or concern regarding medication   Prescription Clarification  Name of Medication: Warfarin  [facility]   Pharmacy:     What on the order needs clarification? Patient having colonoscopy on 7/16/21. Patient was advised to call to see if she should hold Warfarin for 5 days prior to procedure.  No answer in clinic. Please follow up with patient ASAP. Thank you!           Action Taken: Message routed to:  [facility]    Travel Screening: Not Applicable

## 2021-07-12 ENCOUNTER — DOCUMENTATION ONLY (OUTPATIENT)
Dept: ANTICOAGULATION | Facility: CLINIC | Age: 61
End: 2021-07-12

## 2021-07-12 DIAGNOSIS — I26.99 PULMONARY EMBOLI (H): Primary | ICD-10-CM

## 2021-07-12 NOTE — PROGRESS NOTES
Message from [facility] today that patient is scheduled for a colonoscopy on Friday 7/16 and she needs a plan for holding her warfarin. Writer speaks with Dr. Busby and verbal approval is given today for the below plan.    Current weight is 140 kilos and last Cr 0.76 on 6/16.  BMI >40  So Lovenox dose should 0.75mg/ra=227af of Lovenox          Message is left for patient via telephone and my chart message is sent.       7/11 start holding warfarin-patient confirms that she started holding warfarin  7/12 Hold warfarin, start Lovenox 105mg in the PM  7/13 hold warfarin;  Lovenox 105mg AM and PM  7/14 hold warfarin; Lovenox 105mg AM and PM  7/15 hold warfarin; Lovenox 105mg AM dose only  7/16 No warfarin or Lovenox prior to procedure.  Ask MD doing procedure if able to restart anticoagulation.  If yes then warfarin can be started the evening of 7/16 and Lovenox should not be started until 7/17 at 105mg BID until back in INR goal range X 1.

## 2021-07-13 ENCOUNTER — LAB (OUTPATIENT)
Dept: URGENT CARE | Facility: URGENT CARE | Age: 61
End: 2021-07-13
Payer: COMMERCIAL

## 2021-07-13 DIAGNOSIS — Z11.59 ENCOUNTER FOR SCREENING FOR OTHER VIRAL DISEASES: ICD-10-CM

## 2021-07-13 PROCEDURE — U0003 INFECTIOUS AGENT DETECTION BY NUCLEIC ACID (DNA OR RNA); SEVERE ACUTE RESPIRATORY SYNDROME CORONAVIRUS 2 (SARS-COV-2) (CORONAVIRUS DISEASE [COVID-19]), AMPLIFIED PROBE TECHNIQUE, MAKING USE OF HIGH THROUGHPUT TECHNOLOGIES AS DESCRIBED BY CMS-2020-01-R: HCPCS

## 2021-07-13 PROCEDURE — U0005 INFEC AGEN DETEC AMPLI PROBE: HCPCS

## 2021-07-13 NOTE — TELEPHONE ENCOUNTER
Coumadin clinic sent the sigmacare message to the pt regarding post colonoscopy anticoagulation plan.

## 2021-07-14 LAB — SARS-COV-2 RNA RESP QL NAA+PROBE: NEGATIVE

## 2021-07-16 ENCOUNTER — TELEPHONE (OUTPATIENT)
Dept: ANTICOAGULATION | Facility: CLINIC | Age: 61
End: 2021-07-16

## 2021-07-16 ENCOUNTER — HOSPITAL ENCOUNTER (OUTPATIENT)
Facility: CLINIC | Age: 61
Discharge: HOME | End: 2021-07-16
Attending: INTERNAL MEDICINE | Admitting: INTERNAL MEDICINE
Payer: COMMERCIAL

## 2021-07-16 VITALS
DIASTOLIC BLOOD PRESSURE: 56 MMHG | SYSTOLIC BLOOD PRESSURE: 120 MMHG | HEART RATE: 68 BPM | WEIGHT: 293 LBS | OXYGEN SATURATION: 95 % | BODY MASS INDEX: 47.09 KG/M2 | TEMPERATURE: 97.8 F | HEIGHT: 66 IN | RESPIRATION RATE: 16 BRPM

## 2021-07-16 LAB — INR PPP: 1.11 (ref 0.85–1.15)

## 2021-07-16 PROCEDURE — 85610 PROTHROMBIN TIME: CPT | Performed by: INTERNAL MEDICINE

## 2021-07-16 PROCEDURE — 45380 COLONOSCOPY AND BIOPSY: CPT | Performed by: INTERNAL MEDICINE

## 2021-07-16 PROCEDURE — 250N000011 HC RX IP 250 OP 636: Performed by: INTERNAL MEDICINE

## 2021-07-16 PROCEDURE — 99153 MOD SED SAME PHYS/QHP EA: CPT | Performed by: INTERNAL MEDICINE

## 2021-07-16 PROCEDURE — 88305 TISSUE EXAM BY PATHOLOGIST: CPT | Mod: TC | Performed by: INTERNAL MEDICINE

## 2021-07-16 PROCEDURE — 88305 TISSUE EXAM BY PATHOLOGIST: CPT | Mod: 26 | Performed by: PATHOLOGY

## 2021-07-16 PROCEDURE — 45385 COLONOSCOPY W/LESION REMOVAL: CPT | Mod: PT | Performed by: INTERNAL MEDICINE

## 2021-07-16 PROCEDURE — 36415 COLL VENOUS BLD VENIPUNCTURE: CPT | Performed by: INTERNAL MEDICINE

## 2021-07-16 PROCEDURE — G0500 MOD SEDAT ENDO SERVICE >5YRS: HCPCS | Performed by: INTERNAL MEDICINE

## 2021-07-16 RX ORDER — ONDANSETRON 4 MG/1
4 TABLET, ORALLY DISINTEGRATING ORAL EVERY 6 HOURS PRN
Status: DISCONTINUED | OUTPATIENT
Start: 2021-07-16 | End: 2021-07-16 | Stop reason: HOSPADM

## 2021-07-16 RX ORDER — NALOXONE HYDROCHLORIDE 0.4 MG/ML
0.2 INJECTION, SOLUTION INTRAMUSCULAR; INTRAVENOUS; SUBCUTANEOUS
Status: DISCONTINUED | OUTPATIENT
Start: 2021-07-16 | End: 2021-07-16 | Stop reason: HOSPADM

## 2021-07-16 RX ORDER — ONDANSETRON 2 MG/ML
4 INJECTION INTRAMUSCULAR; INTRAVENOUS EVERY 6 HOURS PRN
Status: DISCONTINUED | OUTPATIENT
Start: 2021-07-16 | End: 2021-07-16 | Stop reason: HOSPADM

## 2021-07-16 RX ORDER — LIDOCAINE 40 MG/G
CREAM TOPICAL
Status: DISCONTINUED | OUTPATIENT
Start: 2021-07-16 | End: 2021-07-16 | Stop reason: HOSPADM

## 2021-07-16 RX ORDER — PROCHLORPERAZINE MALEATE 10 MG
10 TABLET ORAL EVERY 6 HOURS PRN
Status: DISCONTINUED | OUTPATIENT
Start: 2021-07-16 | End: 2021-07-16 | Stop reason: HOSPADM

## 2021-07-16 RX ORDER — NALOXONE HYDROCHLORIDE 0.4 MG/ML
0.4 INJECTION, SOLUTION INTRAMUSCULAR; INTRAVENOUS; SUBCUTANEOUS
Status: DISCONTINUED | OUTPATIENT
Start: 2021-07-16 | End: 2021-07-16 | Stop reason: HOSPADM

## 2021-07-16 RX ORDER — FLUMAZENIL 0.1 MG/ML
0.2 INJECTION, SOLUTION INTRAVENOUS
Status: DISCONTINUED | OUTPATIENT
Start: 2021-07-16 | End: 2021-07-16 | Stop reason: HOSPADM

## 2021-07-16 RX ORDER — ONDANSETRON 2 MG/ML
4 INJECTION INTRAMUSCULAR; INTRAVENOUS
Status: DISCONTINUED | OUTPATIENT
Start: 2021-07-16 | End: 2021-07-16 | Stop reason: HOSPADM

## 2021-07-16 RX ORDER — FENTANYL CITRATE 50 UG/ML
INJECTION, SOLUTION INTRAMUSCULAR; INTRAVENOUS PRN
Status: COMPLETED | OUTPATIENT
Start: 2021-07-16 | End: 2021-07-16

## 2021-07-16 RX ADMIN — FENTANYL CITRATE 100 MCG: 50 INJECTION, SOLUTION INTRAMUSCULAR; INTRAVENOUS at 09:22

## 2021-07-16 RX ADMIN — MIDAZOLAM 2 MG: 1 INJECTION INTRAMUSCULAR; INTRAVENOUS at 09:22

## 2021-07-16 ASSESSMENT — MIFFLIN-ST. JEOR: SCORE: 1968.75

## 2021-07-16 NOTE — TELEPHONE ENCOUNTER
ANTICOAGULATION  MANAGEMENT: Discharge Review    Shira Rodriguez chart reviewed for anticoagulation continuity of care    Outpatient surgery/procedure on 7/16/21 for colonoscopy.    Discharge disposition: Home    Results:    Recent labs: (last 7 days)     07/16/21  0856   INR 1.11     Anticoagulation inpatient management:     not applicable     Anticoagulation discharge instructions:     Warfarin dosing: home regimen continued--starting tomorrow, 7/17/21 per procedure report in Epic   Bridging: bridging with enoxaparin (Lovenox)--starting tomorrow, 7/17/21   INR goal change: No      Medication changes affecting anticoagulation: No    Additional factors affecting anticoagulation: No    Plan     Recommend to check INR on 7/21/21  Spoke with lab person at [facility] and got OK to double book INR at 10:30 on 7/21/21.    Spoke with Shira    Anticoagulation Calendar updated    Tea Degn RN

## 2021-07-16 NOTE — LETTER
July 20, 2021      Shira Rodriguez  [address]   [place] [address]        Dear Ms.Rodriguez,    The pathology results returned from the polyp removed at your recent colonoscopy.    The polyp was pre-cancerous but showed no active evidence of cancer.      Due to the microscopic appearance of the polyp, you will require slightly closer monitoring in the future.    Current guidelines recommend that you undergo a follow-up colonoscopy in 7-10 years.    Sincerely,               Diogo Barahona MD   [facility], ENDOSCOPY  [address] [place] [address]  Phone: [phone]

## 2021-07-19 LAB
PATH REPORT.COMMENTS IMP SPEC: NORMAL
PATH REPORT.COMMENTS IMP SPEC: NORMAL
PATH REPORT.FINAL DX SPEC: NORMAL
PATH REPORT.GROSS SPEC: NORMAL
PATH REPORT.MICROSCOPIC SPEC OTHER STN: NORMAL
PATH REPORT.RELEVANT HX SPEC: NORMAL
PHOTO IMAGE: NORMAL

## 2021-07-21 ENCOUNTER — ANTICOAGULATION THERAPY VISIT (OUTPATIENT)
Dept: ANTICOAGULATION | Facility: CLINIC | Age: 61
End: 2021-07-21

## 2021-07-21 ENCOUNTER — LAB (OUTPATIENT)
Dept: LAB | Facility: CLINIC | Age: 61
End: 2021-07-21
Payer: COMMERCIAL

## 2021-07-21 DIAGNOSIS — Z79.01 LONG TERM CURRENT USE OF ANTICOAGULANT THERAPY: ICD-10-CM

## 2021-07-21 DIAGNOSIS — I26.99 PULMONARY EMBOLI (H): Primary | ICD-10-CM

## 2021-07-21 DIAGNOSIS — Z79.01 LONG TERM CURRENT USE OF ANTICOAGULANT THERAPY: Primary | ICD-10-CM

## 2021-07-21 LAB — INR BLD: 1.3 (ref 0.9–1.1)

## 2021-07-21 PROCEDURE — 36416 COLLJ CAPILLARY BLOOD SPEC: CPT

## 2021-07-21 PROCEDURE — 85610 PROTHROMBIN TIME: CPT

## 2021-07-21 NOTE — PROGRESS NOTES
Addend: Colonoscopy 7/16-restarted Warfarin at home regimen 7/17 with Lovenox bridge starting 7/17 post procedure.    ANTICOAGULATION MANAGEMENT     Shira Rodriguez 61 year old female is on warfarin with subtherapeutic INR result. (Goal INR 2.0-3.0)    Recent labs: (last 7 days)     07/21/21  1036   INR 1.3*       ASSESSMENT     Source(s): Chart Review and Patient/Caregiver Call     Warfarin doses taken: Warfarin taken as instructed  Diet: No new diet changes identified  New illness, injury, or hospitalization: Yes: colonoscopy 7/16  Medication/supplement changes: None noted  Signs or symptoms of bleeding or clotting: No  Previous INR: Subtherapeutic  Additional findings: Bridging with Enoxaparin until INR >= 2.3 or INR >= 2.0 x 2 ([facility] protocol goal INR 2-3)     PLAN     Recommended plan for temporary change(s) affecting INR     Dosing Instructions: Booster dose then continue your current warfarin dose 7.5 mg every Mon, Fri; 5 mg all other days with next INR in 5 days  She will continue taking her Lovenox as previously instructed. She denies needing a refill of Lovenox. Unable to recheck on Mon or Tues, appt scheduled for Wed next week.    Telephone call with Shira who verbalizes understanding and agrees to plan and who agrees to plan and repeated back plan correctly    Lab visit scheduled    Education provided: Please call back if any changes to your diet, medications or how you've been taking warfarin, Target INR goal and significance of current INR result, Monitoring for bleeding signs and symptoms, Lovenox/Heparin education provided: prescribed dose and frequency, monitoring for signs and symptoms of bruising and bleeding and monitoring for signs and symptoms of clotting  and Contact [phone] with any changes, questions or concerns.     Plan made with [facility] Pharmacist Kelly DAVIDSON, RN  Anticoagulation  Clinic  2021    _______________________________________________________________________     Anticoagulation Summary  As of 2021    INR goal:  2.0-3.0   TTR:  40.7 % (1 y)   INR used for dosin.3 (2021)   Warfarin maintenance plan:  7.5 mg (5 mg x 1.5) every Mon, Fri; 5 mg (5 mg x 1) all other days   Full warfarin instructions:  7.5 mg every Mon, Fri; 5 mg all other days   Weekly warfarin total:  40 mg   Plan last modified:  Patience Martins, RN (2021)   Next INR check:     Priority:  Maintenance   Target end date:  Indefinite    Indications    Pulmonary emboli (H) [I26.99]  Long term current use of anticoagulant therapy [Z79.01]             Anticoagulation Episode Summary     INR check location:      Preferred lab:      Send INR reminders to:  [facility]    Comments:  HIPPA Forms returned 19  OK to leave messages on Cell  [phone]      Anticoagulation Care Providers     Provider Role Specialty Phone number    Anjel Busby MD Referring Family Medicine [phone]

## 2021-07-26 ENCOUNTER — DOCUMENTATION ONLY (OUTPATIENT)
Dept: ANTICOAGULATION | Facility: CLINIC | Age: 61
End: 2021-07-26

## 2021-07-26 DIAGNOSIS — Z79.01 LONG TERM CURRENT USE OF ANTICOAGULANT THERAPY: ICD-10-CM

## 2021-07-26 DIAGNOSIS — I26.92 CHRONIC SADDLE PULMONARY EMBOLISM WITHOUT ACUTE COR PULMONALE (H): ICD-10-CM

## 2021-07-26 DIAGNOSIS — I27.82 CHRONIC SADDLE PULMONARY EMBOLISM WITHOUT ACUTE COR PULMONALE (H): ICD-10-CM

## 2021-07-26 DIAGNOSIS — I26.01 ACUTE SEPTIC PULMONARY EMBOLISM WITH ACUTE COR PULMONALE (H): ICD-10-CM

## 2021-07-26 DIAGNOSIS — I26.99 PULMONARY EMBOLISM, UNSPECIFIED CHRONICITY, UNSPECIFIED PULMONARY EMBOLISM TYPE, UNSPECIFIED WHETHER ACUTE COR PULMONALE PRESENT (H): ICD-10-CM

## 2021-07-26 DIAGNOSIS — I26.99 PULMONARY EMBOLI (H): Primary | ICD-10-CM

## 2021-07-28 ENCOUNTER — ANTICOAGULATION THERAPY VISIT (OUTPATIENT)
Dept: ANTICOAGULATION | Facility: CLINIC | Age: 61
End: 2021-07-28

## 2021-07-28 ENCOUNTER — LAB (OUTPATIENT)
Dept: LAB | Facility: CLINIC | Age: 61
End: 2021-07-28
Payer: COMMERCIAL

## 2021-07-28 DIAGNOSIS — I26.92 CHRONIC SADDLE PULMONARY EMBOLISM WITHOUT ACUTE COR PULMONALE (H): ICD-10-CM

## 2021-07-28 DIAGNOSIS — Z79.01 LONG TERM CURRENT USE OF ANTICOAGULANT THERAPY: ICD-10-CM

## 2021-07-28 DIAGNOSIS — I26.99 PULMONARY EMBOLISM, UNSPECIFIED CHRONICITY, UNSPECIFIED PULMONARY EMBOLISM TYPE, UNSPECIFIED WHETHER ACUTE COR PULMONALE PRESENT (H): ICD-10-CM

## 2021-07-28 DIAGNOSIS — I26.99 PULMONARY EMBOLI (H): Primary | ICD-10-CM

## 2021-07-28 DIAGNOSIS — I26.99 PULMONARY EMBOLI (H): ICD-10-CM

## 2021-07-28 DIAGNOSIS — I27.82 CHRONIC SADDLE PULMONARY EMBOLISM WITHOUT ACUTE COR PULMONALE (H): ICD-10-CM

## 2021-07-28 DIAGNOSIS — I26.01 ACUTE SEPTIC PULMONARY EMBOLISM WITH ACUTE COR PULMONALE (H): ICD-10-CM

## 2021-07-28 LAB — INR BLD: 2.6 (ref 0.9–1.1)

## 2021-07-28 PROCEDURE — 85610 PROTHROMBIN TIME: CPT

## 2021-07-28 PROCEDURE — 36416 COLLJ CAPILLARY BLOOD SPEC: CPT

## 2021-07-28 NOTE — PROGRESS NOTES
ANTICOAGULATION MANAGEMENT     Shira Rodriguez 61 year old female is on warfarin with therapeutic INR result. (Goal INR 2.0-3.0)    Recent labs: (last 7 days)     07/28/21  1054   INR 2.6*       ASSESSMENT     Source(s): Chart Review and Patient/Caregiver Call     Warfarin doses taken: Warfarin taken as instructed  Diet: No new diet changes identified  New illness, injury, or hospitalization: No  Medication/supplement changes: None noted  Signs or symptoms of bleeding or clotting: No  Previous INR: Subtherapeutic  Additional findings: Patient will stop Lovenox     PLAN     Recommended plan for no diet, medication or health factor changes affecting INR     Dosing Instructions: Continue your current warfarin dose with next INR in 2 weeks       Summary  As of 7/28/2021    Full warfarin instructions:  7.5 mg every Mon, Fri; 5 mg all other days   Next INR check:               Telephone call with Shira who verbalizes understanding and agrees to plan and who agrees to plan and repeated back plan correctly    Patient offered & declined to schedule next visit    Education provided: Please call back if any changes to your diet, medications or how you've been taking warfarin and Contact [phone] with any changes, questions or concerns.     Plan made per [facility] anticoagulation protocol    Mariah Sellers RN  Anticoagulation Clinic  7/28/2021    _______________________________________________________________________     Anticoagulation Episode Summary     Current INR goal:  2.0-3.0   TTR:  39.7 % (1 y)   Target end date:  Indefinite   Send INR reminders to:  [facility]    Indications    Pulmonary emboli (H) [I26.99]  Long term current use of anticoagulant therapy [Z79.01]           Comments:  HIPPA Forms returned 11/13/19  OK to leave messages on Cell  [phone]         Anticoagulation Care Providers     Provider Role Specialty Phone number    Anjel Busby MD Referring Family Medicine [phone]

## 2021-08-04 DIAGNOSIS — I26.09 PULMONARY EMBOLISM WITH ACUTE COR PULMONALE, UNSPECIFIED CHRONICITY, UNSPECIFIED PULMONARY EMBOLISM TYPE (H): ICD-10-CM

## 2021-08-04 RX ORDER — WARFARIN SODIUM 5 MG/1
TABLET ORAL
Qty: 135 TABLET | Refills: 1 | Status: SHIPPED | OUTPATIENT
Start: 2021-08-04 | End: 2022-07-07

## 2021-08-05 DIAGNOSIS — I46.9 CARDIAC ARREST (H): ICD-10-CM

## 2021-08-06 NOTE — PROGRESS NOTES
Outpatient Physical Therapy Discharge Note     Patient: Shira Rodriguez  : 1960    Beginning/End Dates of Reporting Period:  10/17/19 to 20    Referring Provider: Carli Salas PA-C    Therapy Diagnosis: Deconditioned status with impaired functional mobility     Client Self Report: Feels fatigued overall, voicing some sense of depressed feelings. Sent application for a job.    Objective Measurements:  Objective Measure: 30'' Chair stand  Details: 9 reps from 18'' plinth surface (6 reps at eval, 7 reps from 17'' surface 12/10/19)  Objective Measure: Gait Speed  Details: 0.80 m/sec, no AD (improved from eval 0.67 m/sec, but regressed from 12/10/19 0.85 m/sec)  Objective Measure: TUG  Details: 10.63'' no AD (improved from eval 17.89'' but regressed from 12/10/19 9.1'').    Goals:  Goal Identifier TUG   Goal Description Pt to demo TUG in <8'' to show improving functional mobility status and reduced fall risk (baseline 17.89'' with FWW).   Target Date 20   Date Met      Progress (detail required for progress note): goal not met, in progress     Goal Identifier Gait Speed   Goal Description Pt will demo gait speed of >1.1 m/sec to show improving functional community speeds and reduced fall risk (baseline 0.67 m/sec with FWW).   Target Date 20   Date Met      Progress (detail required for progress note): goal not met, in progress     Goal Identifier 30'' Chair Stand   Goal Description Pt to demo 12 or more reps chair stand from 18'' surface in 30'' to show improving strength and and functional mobility status (baseline 6 reps).   Target Date 20   Date Met      Progress (detail required for progress note): goal not met, in progress     Goal Identifier Balance   Goal Description Pt to demo ability to safely reach to floor to  items without UE support, no LOB, for improved stability with ADLs and functional mobility.   Target Date 01/15/20   Date Met  20   Progress (detail  required for progress note): goal not met, in progress     Goal Identifier HEP   Goal Description Pt will demo (I) with HEP for ongoing management/improvement in condition and optimal QOL.   Target Date 03/11/20   Date Met      Progress (detail required for progress note): goal partially met, progressing         Plan:  Discharge from therapy.    Discharge: Patient seen x 16 visits to address deconditioned status. Patient called to request discharge from PT, will continue HEP on her own.    Reason for Discharge: Patient chooses to discontinue therapy.  Patient has failed to schedule further appointments.    Equipment Issued: HEP    Discharge Plan: Patient to continue home program.

## 2021-08-08 RX ORDER — LISINOPRIL 5 MG/1
5 TABLET ORAL DAILY
Qty: 90 TABLET | Refills: 3 | Status: SHIPPED | OUTPATIENT
Start: 2021-08-08 | End: 2022-08-22

## 2021-08-08 NOTE — CONFIDENTIAL NOTE
6/16/2021  [facility]   Anjel Busby MD  Family Medicine     lisinopril (ZESTRIL) 5 MG tablet

## 2021-08-11 ENCOUNTER — LAB (OUTPATIENT)
Dept: LAB | Facility: CLINIC | Age: 61
End: 2021-08-11
Payer: COMMERCIAL

## 2021-08-11 ENCOUNTER — ANTICOAGULATION THERAPY VISIT (OUTPATIENT)
Dept: ANTICOAGULATION | Facility: CLINIC | Age: 61
End: 2021-08-11

## 2021-08-11 DIAGNOSIS — Z79.01 LONG TERM CURRENT USE OF ANTICOAGULANT THERAPY: ICD-10-CM

## 2021-08-11 DIAGNOSIS — I26.99 PULMONARY EMBOLI (H): ICD-10-CM

## 2021-08-11 DIAGNOSIS — I26.92 CHRONIC SADDLE PULMONARY EMBOLISM WITHOUT ACUTE COR PULMONALE (H): ICD-10-CM

## 2021-08-11 DIAGNOSIS — I26.01 ACUTE SEPTIC PULMONARY EMBOLISM WITH ACUTE COR PULMONALE (H): ICD-10-CM

## 2021-08-11 DIAGNOSIS — I26.99 PULMONARY EMBOLISM, UNSPECIFIED CHRONICITY, UNSPECIFIED PULMONARY EMBOLISM TYPE, UNSPECIFIED WHETHER ACUTE COR PULMONALE PRESENT (H): ICD-10-CM

## 2021-08-11 DIAGNOSIS — I27.82 CHRONIC SADDLE PULMONARY EMBOLISM WITHOUT ACUTE COR PULMONALE (H): ICD-10-CM

## 2021-08-11 DIAGNOSIS — I26.99 PULMONARY EMBOLI (H): Primary | ICD-10-CM

## 2021-08-11 LAB — INR BLD: 2.1 (ref 0.9–1.1)

## 2021-08-11 PROCEDURE — 36415 COLL VENOUS BLD VENIPUNCTURE: CPT

## 2021-08-11 PROCEDURE — 85610 PROTHROMBIN TIME: CPT

## 2021-08-11 NOTE — PROGRESS NOTES
ANTICOAGULATION MANAGEMENT     Shira Rodriguez 61 year old female is on warfarin with therapeutic INR result. (Goal INR 2.0-3.0)    Recent labs: (last 7 days)     08/11/21  1117   INR 2.1*       ASSESSMENT     Source(s): Chart Review and Patient/Caregiver Call     Warfarin doses taken: Warfarin taken as instructed  Diet: No new diet changes identified  New illness, injury, or hospitalization: No  Medication/supplement changes: None noted  Signs or symptoms of bleeding or clotting: No  Previous INR: Therapeutic last visit; previously outside of goal range  Additional findings: None     PLAN     Recommended plan for no diet, medication or health factor changes affecting INR     Dosing Instructions: Continue your current warfarin dose with next INR in 2 weeks       Summary  As of 8/11/2021    Full warfarin instructions:  7.5 mg every Mon, Fri; 5 mg all other days   Next INR check:  8/25/2021             Telephone call with Shira who verbalizes understanding and agrees to plan and who agrees to plan and repeated back plan correctly    Patient offered & declined to schedule next visit    Education provided: Monitoring for bleeding signs and symptoms, Monitoring for clotting signs and symptoms and Contact [phone] with any changes, questions or concerns.     Plan made per [facility]agulation protocol    YUNIER DAVIDSON RN  Anticoagulation Clinic  8/11/2021    _______________________________________________________________________     Anticoagulation Episode Summary     Current INR goal:  2.0-3.0   TTR:  42.8 % (1 y)   Target end date:  Indefinite   Send INR reminders to:  [facility]    Indications    Pulmonary emboli (H) [I26.99]  Long term current use of anticoagulant therapy [Z79.01]           Comments:  HIPPA Forms returned 11/13/19  OK to leave messages on Cell  [phone]         Anticoagulation Care Providers     Provider Role Specialty Phone number    Anjel Busby MD Referring Family Medicine  [phone]

## 2021-09-01 ENCOUNTER — LAB (OUTPATIENT)
Dept: LAB | Facility: CLINIC | Age: 61
End: 2021-09-01
Payer: COMMERCIAL

## 2021-09-01 ENCOUNTER — ANTICOAGULATION THERAPY VISIT (OUTPATIENT)
Dept: ANTICOAGULATION | Facility: CLINIC | Age: 61
End: 2021-09-01

## 2021-09-01 DIAGNOSIS — I26.99 PULMONARY EMBOLI (H): Primary | ICD-10-CM

## 2021-09-01 DIAGNOSIS — Z79.01 LONG TERM CURRENT USE OF ANTICOAGULANT THERAPY: Primary | ICD-10-CM

## 2021-09-01 DIAGNOSIS — Z79.01 LONG TERM CURRENT USE OF ANTICOAGULANT THERAPY: ICD-10-CM

## 2021-09-01 DIAGNOSIS — I26.99 PULMONARY EMBOLISM, UNSPECIFIED CHRONICITY, UNSPECIFIED PULMONARY EMBOLISM TYPE, UNSPECIFIED WHETHER ACUTE COR PULMONALE PRESENT (H): ICD-10-CM

## 2021-09-01 LAB — INR BLD: 2.7 (ref 0.9–1.1)

## 2021-09-01 PROCEDURE — 36416 COLLJ CAPILLARY BLOOD SPEC: CPT

## 2021-09-01 PROCEDURE — 85610 PROTHROMBIN TIME: CPT

## 2021-09-01 NOTE — PROGRESS NOTES
ANTICOAGULATION MANAGEMENT     Shira Rodriguez 61 year old female is on warfarin with therapeutic INR result. (Goal INR 2.0-3.0)    Recent labs: (last 7 days)     09/01/21  1136   INR 2.7*       ASSESSMENT     Source(s): Chart Review and Patient/Caregiver Call     Warfarin doses taken: Warfarin taken as instructed  Diet: No new diet changes identified  New illness, injury, or hospitalization: No  Medication/supplement changes: None noted  Signs or symptoms of bleeding or clotting: No  Previous INR: Therapeutic last 2(+) visits  Additional findings: None     PLAN     Recommended plan for no diet, medication or health factor changes affecting INR     Dosing Instructions: Continue your current warfarin dose with next INR in 4 weeks       Summary  As of 9/1/2021    Full warfarin instructions:  7.5 mg every Mon, Fri; 5 mg all other days   Next INR check:  9/29/2021             Telephone call with Shira who verbalizes understanding and agrees to plan    Patient offered & declined to schedule next visit    Education provided: None required    Plan made per [facility]agulation protocol    Rich Lopez, RN  Anticoagulation Clinic  9/1/2021    _______________________________________________________________________     Anticoagulation Episode Summary     Current INR goal:  2.0-3.0   TTR:  48.5 % (1 y)   Target end date:  Indefinite   Send INR reminders to:  [facility]    Indications    Pulmonary emboli (H) [I26.99]  Long term current use of anticoagulant therapy [Z79.01]           Comments:  HIPPA Forms returned 11/13/19  OK to leave messages on Cell  [phone]         Anticoagulation Care Providers     Provider Role Specialty Phone number    Anjel Busby MD Referring Family Medicine [phone]

## 2021-09-18 ENCOUNTER — HEALTH MAINTENANCE LETTER (OUTPATIENT)
Age: 61
End: 2021-09-18

## 2021-10-06 ENCOUNTER — ANTICOAGULATION THERAPY VISIT (OUTPATIENT)
Dept: ANTICOAGULATION | Facility: CLINIC | Age: 61
End: 2021-10-06

## 2021-10-06 ENCOUNTER — LAB (OUTPATIENT)
Dept: LAB | Facility: CLINIC | Age: 61
End: 2021-10-06
Payer: COMMERCIAL

## 2021-10-06 DIAGNOSIS — I26.99 PULMONARY EMBOLI (H): Primary | ICD-10-CM

## 2021-10-06 DIAGNOSIS — I26.92 CHRONIC SADDLE PULMONARY EMBOLISM WITHOUT ACUTE COR PULMONALE (H): ICD-10-CM

## 2021-10-06 DIAGNOSIS — I26.01 ACUTE SEPTIC PULMONARY EMBOLISM WITH ACUTE COR PULMONALE (H): ICD-10-CM

## 2021-10-06 DIAGNOSIS — I26.99 PULMONARY EMBOLISM, UNSPECIFIED CHRONICITY, UNSPECIFIED PULMONARY EMBOLISM TYPE, UNSPECIFIED WHETHER ACUTE COR PULMONALE PRESENT (H): ICD-10-CM

## 2021-10-06 DIAGNOSIS — Z79.01 LONG TERM CURRENT USE OF ANTICOAGULANT THERAPY: ICD-10-CM

## 2021-10-06 DIAGNOSIS — I27.82 CHRONIC SADDLE PULMONARY EMBOLISM WITHOUT ACUTE COR PULMONALE (H): ICD-10-CM

## 2021-10-06 DIAGNOSIS — I26.99 PULMONARY EMBOLI (H): ICD-10-CM

## 2021-10-06 DIAGNOSIS — Z79.631 LONG TERM METHOTREXATE USER: ICD-10-CM

## 2021-10-06 LAB
CRP SERPL-MCNC: 6.8 MG/L (ref 0–8)
ERYTHROCYTE [DISTWIDTH] IN BLOOD BY AUTOMATED COUNT: 14.4 % (ref 10–15)
HCT VFR BLD AUTO: 44.7 % (ref 35–47)
HGB BLD-MCNC: 14.3 G/DL (ref 11.7–15.7)
INR BLD: 2.2 (ref 0.9–1.1)
MCH RBC QN AUTO: 30.5 PG (ref 26.5–33)
MCHC RBC AUTO-ENTMCNC: 32 G/DL (ref 31.5–36.5)
MCV RBC AUTO: 95 FL (ref 78–100)
PLATELET # BLD AUTO: 86 10E3/UL (ref 150–450)
RBC # BLD AUTO: 4.69 10E6/UL (ref 3.8–5.2)
WBC # BLD AUTO: 4.2 10E3/UL (ref 4–11)

## 2021-10-06 PROCEDURE — 84460 ALANINE AMINO (ALT) (SGPT): CPT

## 2021-10-06 PROCEDURE — 86140 C-REACTIVE PROTEIN: CPT

## 2021-10-06 PROCEDURE — 84450 TRANSFERASE (AST) (SGOT): CPT

## 2021-10-06 PROCEDURE — 82040 ASSAY OF SERUM ALBUMIN: CPT

## 2021-10-06 PROCEDURE — 85610 PROTHROMBIN TIME: CPT

## 2021-10-06 PROCEDURE — 36416 COLLJ CAPILLARY BLOOD SPEC: CPT

## 2021-10-06 PROCEDURE — 85027 COMPLETE CBC AUTOMATED: CPT

## 2021-10-06 PROCEDURE — 82565 ASSAY OF CREATININE: CPT

## 2021-10-06 PROCEDURE — 36415 COLL VENOUS BLD VENIPUNCTURE: CPT

## 2021-10-06 NOTE — PROGRESS NOTES
ANTICOAGULATION MANAGEMENT     Shira Rodriguez 61 year old female is on warfarin with therapeutic INR result. (Goal INR 2.0-3.0)    Recent labs: (last 7 days)     10/06/21  1128   INR 2.2*       ASSESSMENT     Source(s): Chart Review and Patient/Caregiver Call     Warfarin doses taken: Warfarin taken as instructed  Diet: No new diet changes identified  New illness, injury, or hospitalization: No  Medication/supplement changes: None noted  Signs or symptoms of bleeding or clotting: No  Previous INR: Therapeutic last 2(+) visits  Additional findings: None     PLAN     Recommended plan for no diet, medication or health factor changes affecting INR     Dosing Instructions: Continue your current warfarin dose with next INR in 4 weeks       Summary  As of 10/6/2021    Full warfarin instructions:  7.5 mg every Mon, Fri; 5 mg all other days   Next INR check:  11/3/2021             Telephone call with Shira who verbalizes understanding and agrees to plan    Lab visit scheduled    Education provided: Please call back if any changes to your diet, medications or how you've been taking warfarin and Contact [phone] with any changes, questions or concerns.     Plan made per [facility]agulation protocol    Tea Deng RN  Anticoagulation Clinic  10/6/2021    _______________________________________________________________________     Anticoagulation Episode Summary     Current INR goal:  2.0-3.0   TTR:  58.1 % (1 y)   Target end date:  Indefinite   Send INR reminders to:  [facility]    Indications    Pulmonary emboli (H) [I26.99]  Long term current use of anticoagulant therapy [Z79.01]           Comments:  HIPPA Forms returned 11/13/19  OK to leave messages on Cell  [phone]         Anticoagulation Care Providers     Provider Role Specialty Phone number    Anjel Busby MD Referring Family Medicine [phone]

## 2021-10-07 LAB
ALBUMIN SERPL-MCNC: 3.1 G/DL (ref 3.4–5)
ALT SERPL W P-5'-P-CCNC: 17 U/L (ref 0–50)
AST SERPL W P-5'-P-CCNC: 9 U/L (ref 0–45)
CREAT SERPL-MCNC: 0.76 MG/DL (ref 0.52–1.04)
GFR SERPL CREATININE-BSD FRML MDRD: 86 ML/MIN/1.73M2

## 2021-11-03 ENCOUNTER — LAB (OUTPATIENT)
Dept: LAB | Facility: CLINIC | Age: 61
End: 2021-11-03
Payer: COMMERCIAL

## 2021-11-03 ENCOUNTER — ANTICOAGULATION THERAPY VISIT (OUTPATIENT)
Dept: ANTICOAGULATION | Facility: CLINIC | Age: 61
End: 2021-11-03

## 2021-11-03 DIAGNOSIS — I26.92 CHRONIC SADDLE PULMONARY EMBOLISM WITHOUT ACUTE COR PULMONALE (H): ICD-10-CM

## 2021-11-03 DIAGNOSIS — I26.01 ACUTE SEPTIC PULMONARY EMBOLISM WITH ACUTE COR PULMONALE (H): ICD-10-CM

## 2021-11-03 DIAGNOSIS — I26.99 PULMONARY EMBOLI (H): ICD-10-CM

## 2021-11-03 DIAGNOSIS — Z79.01 LONG TERM CURRENT USE OF ANTICOAGULANT THERAPY: ICD-10-CM

## 2021-11-03 DIAGNOSIS — I26.99 PULMONARY EMBOLI (H): Primary | ICD-10-CM

## 2021-11-03 DIAGNOSIS — I27.82 CHRONIC SADDLE PULMONARY EMBOLISM WITHOUT ACUTE COR PULMONALE (H): ICD-10-CM

## 2021-11-03 DIAGNOSIS — I26.99 PULMONARY EMBOLISM, UNSPECIFIED CHRONICITY, UNSPECIFIED PULMONARY EMBOLISM TYPE, UNSPECIFIED WHETHER ACUTE COR PULMONALE PRESENT (H): ICD-10-CM

## 2021-11-03 LAB — INR BLD: 2.7 (ref 0.9–1.1)

## 2021-11-03 PROCEDURE — 85610 PROTHROMBIN TIME: CPT

## 2021-11-03 PROCEDURE — 36416 COLLJ CAPILLARY BLOOD SPEC: CPT

## 2021-11-03 NOTE — PROGRESS NOTES
ANTICOAGULATION MANAGEMENT     Shira Rodriguez 61 year old female is on warfarin with therapeutic INR result. (Goal INR 2.0-3.0)    Recent labs: (last 7 days)     11/03/21  1052   INR 2.7*       ASSESSMENT     Source(s): Patient/Caregiver Call     Warfarin doses taken: Warfarin taken as instructed  Diet: No new diet changes identified  New illness, injury, or hospitalization: No  Medication/supplement changes: None noted  Signs or symptoms of bleeding or clotting: No  Previous INR: Therapeutic last 2(+) visits  Additional findings: None     PLAN     Recommended plan for no diet, medication or health factor changes affecting INR     Dosing Instructions: Continue your current warfarin dose with next INR in 4 weeks       Summary  As of 11/3/2021    Full warfarin instructions:  7.5 mg every Mon, Fri; 5 mg all other days   Next INR check:               Telephone call with Shira who agrees to plan and repeated back plan correctly    Lab visit scheduled    Education provided: Please call back if any changes to your diet, medications or how you've been taking warfarin and Contact [phone] with any changes, questions or concerns.     Plan made per [facility]agulation protocol    Tea Deng RN  Anticoagulation Clinic  11/3/2021    _______________________________________________________________________     Anticoagulation Episode Summary     Current INR goal:  2.0-3.0   TTR:  65.8 % (1 y)   Target end date:  Indefinite   Send INR reminders to:  [facility]    Indications    Pulmonary emboli (H) [I26.99]  Long term current use of anticoagulant therapy [Z79.01]           Comments:  HIPPA Forms returned 11/13/19  OK to leave messages on Cell  [phone]         Anticoagulation Care Providers     Provider Role Specialty Phone number    Anjel Busby MD Referring Family Medicine [phone]

## 2021-11-23 VITALS — WEIGHT: 293 LBS | BODY MASS INDEX: 47.09 KG/M2 | HEIGHT: 66 IN

## 2021-11-23 ASSESSMENT — MIFFLIN-ST. JEOR: SCORE: 1947.54

## 2021-11-23 NOTE — PROGRESS NOTES
Shira Rodriguez is a 61 year old who is being evaluated via a billable video visit.      How would you like to obtain your AVS? MyChart  If the video visit is dropped, the invitation should be resent by: Send to e-mail at: [email or web address]  Will anyone else be joining your video visit? No    Are you currently in the state of [place] Yes  Does patient have any form of state insurance?No   Do you have wifi? Yes  Do you have a smart phone/device?Yes  Can you download an preston on your phone comfortably with out assistance including You Tube? Yes    If patient encounters technical issues they should call [phone] :[id number]}    Sherice Whittington CMA    Video-Visit Details    Video Start Time: 1057    Type of service:  Video Visit    Video End Time:1115    Originating Location (pt. Location): Home    Distant Location (provider location):  [email or web address]     Platform used for Video Visit: Powered     Brief staff note    Patient returns to discuss plans for repeat sleep study.     Please see previous notes.      Patient with severe CYRUS with hypoxemia, morbid obesity and previously unable to tolerate CPAP.  She is a retired nurse and is currently treating her sleep disordered breathing by elevating the HOB. Plan was to repeat PSG but this was held because of COVID pandemic.     Orders placed for split night with [facility].  Patient is at high risk for hypoventilation.  Asked that the HOB stay elevated at 45 degrees as this is how she likes to sleep at home.     She also has a bit of a circadian delay.  We discussed the benefits of consistent awakenings with bright light in the AM.      She denies nasal obstruction.     She is tired during the day but denies sleepiness while driving.     She is still not smoking.  She received the COVID vaccine.     She will follow up with us after her split night PSG with TCM.  Asked her to continue to sleep with the HOB elevated.      All questions were answered.    It is a great privilege being  asked to participate in this patients care.  The patient has been advised on the importance in of never operating operating a motor vehicle while tired or sleepy.        I visited with the patient directly but also extensively reviewed chart and coordinated care. Total time spent in the care of this patient today (Face-to-Face time + chart time, , and coordination of care) was 35 minutes.

## 2021-11-23 NOTE — PATIENT INSTRUCTIONS
Your BMI is Body mass index is 48.42 kg/m .  Weight management is a personal decision.  If you are interested in exploring weight loss strategies, the following discussion covers the approaches that may be successful. Body mass index (BMI) is one way to tell whether you are at a healthy weight, overweight, or obese. It measures your weight in relation to your height.  A BMI of 18.5 to 24.9 is in the healthy range. A person with a BMI of 25 to 29.9 is considered overweight, and someone with a BMI of 30 or greater is considered obese. More than two-thirds of American adults are considered overweight or obese.  Being overweight or obese increases the risk for further weight gain. Excess weight may lead to heart disease and diabetes.  Creating and following plans for healthy eating and physical activity may help you improve your health.  Weight control is part of healthy lifestyle and includes exercise, emotional health, and healthy eating habits. Careful eating habits lifelong are the mainstay of weight control. Though there are significant health benefits from weight loss, long-term weight loss with diet alone may be very difficult to achieve- studies show long-term success with dietary management in less than 10% of people. Attaining a healthy weight may be especially difficult to achieve in those with severe obesity. In some cases, medications, devices and surgical management might be considered.  What can you do?  If you are overweight or obese and are interested in methods for weight loss, you should discuss this with your provider.     Consider reducing daily calorie intake by 500 calories.     Keep a food journal.     Avoiding skipping meals, consider cutting portions instead.    Diet combined with exercise helps maintain muscle while optimizing fat loss. Strength training is particularly important for building and maintaining muscle mass. Exercise helps reduce stress, increase energy, and improves fitness.  Increasing exercise without diet control, however, may not burn enough calories to loose weight.       Start walking three days a week 10-20 minutes at a time    Work towards walking thirty minutes five days a week     Eventually, increase the speed of your walking for 1-2 minutes at time    In addition, we recommend that you review healthy lifestyles and methods for weight loss available through the National Institutes of Health patient information sites:  http://win.niddk.nih.gov/publications/index.htm    And look into health and wellness programs that may be available through your health insurance provider, employer, local community center, or [employer].    Weight management plan: Patient was referred to their PCP to discuss a diet and exercise plan.

## 2021-11-24 ENCOUNTER — VIRTUAL VISIT (OUTPATIENT)
Dept: SLEEP MEDICINE | Facility: CLINIC | Age: 61
End: 2021-11-24
Payer: COMMERCIAL

## 2021-11-24 DIAGNOSIS — G47.33 OSA (OBSTRUCTIVE SLEEP APNEA): Primary | ICD-10-CM

## 2021-11-24 PROCEDURE — 99214 OFFICE O/P EST MOD 30 MIN: CPT | Mod: 95 | Performed by: PSYCHIATRY & NEUROLOGY

## 2021-12-01 ENCOUNTER — ANTICOAGULATION THERAPY VISIT (OUTPATIENT)
Dept: ANTICOAGULATION | Facility: CLINIC | Age: 61
End: 2021-12-01

## 2021-12-01 ENCOUNTER — LAB (OUTPATIENT)
Dept: LAB | Facility: CLINIC | Age: 61
End: 2021-12-01
Payer: COMMERCIAL

## 2021-12-01 DIAGNOSIS — I26.92 CHRONIC SADDLE PULMONARY EMBOLISM WITHOUT ACUTE COR PULMONALE (H): ICD-10-CM

## 2021-12-01 DIAGNOSIS — I26.99 PULMONARY EMBOLI (H): ICD-10-CM

## 2021-12-01 DIAGNOSIS — Z79.01 LONG TERM CURRENT USE OF ANTICOAGULANT THERAPY: ICD-10-CM

## 2021-12-01 DIAGNOSIS — I26.99 PULMONARY EMBOLI (H): Primary | ICD-10-CM

## 2021-12-01 DIAGNOSIS — I27.82 CHRONIC SADDLE PULMONARY EMBOLISM WITHOUT ACUTE COR PULMONALE (H): ICD-10-CM

## 2021-12-01 DIAGNOSIS — I26.01 ACUTE SEPTIC PULMONARY EMBOLISM WITH ACUTE COR PULMONALE (H): ICD-10-CM

## 2021-12-01 DIAGNOSIS — I26.99 PULMONARY EMBOLISM, UNSPECIFIED CHRONICITY, UNSPECIFIED PULMONARY EMBOLISM TYPE, UNSPECIFIED WHETHER ACUTE COR PULMONALE PRESENT (H): ICD-10-CM

## 2021-12-01 LAB — INR BLD: 2 (ref 0.9–1.1)

## 2021-12-01 PROCEDURE — 85610 PROTHROMBIN TIME: CPT

## 2021-12-01 PROCEDURE — 36415 COLL VENOUS BLD VENIPUNCTURE: CPT

## 2021-12-01 NOTE — PROGRESS NOTES
ANTICOAGULATION MANAGEMENT     Shira Rodriguez 61 year old female is on warfarin with therapeutic INR result. (Goal INR 2.0-3.0)    Recent labs: (last 7 days)     12/01/21  1051   INR 2.0*       ASSESSMENT     Source(s): Chart Review and Patient/Caregiver Call     Warfarin doses taken: Warfarin taken as instructed  Diet: No new diet changes identified  New illness, injury, or hospitalization: No  Medication/supplement changes: None noted  Signs or symptoms of bleeding or clotting: No  Previous INR: Therapeutic last 2(+) visits  Additional findings: None     PLAN     Recommended plan for no diet, medication or health factor changes affecting INR     Dosing Instructions: Continue your current warfarin dose with next INR in 4 weeks       Summary  As of 12/1/2021    Full warfarin instructions:  7.5 mg every Mon, Fri; 5 mg all other days   Next INR check:  12/29/2021             Telephone call with Shira who agrees to plan and repeated back plan correctly    Lab visit scheduled    Education provided: None required    Plan made per [facility] anticoagulation protocol    Rich Lopez, RN  Anticoagulation Clinic  12/1/2021    _______________________________________________________________________     Anticoagulation Episode Summary     Current INR goal:  2.0-3.0   TTR:  67.1 % (1 y)   Target end date:  Indefinite   Send INR reminders to:  [facility]    Indications    Pulmonary emboli (H) [I26.99]  Long term current use of anticoagulant therapy [Z79.01]           Comments:  HIPPA Forms returned 11/13/19  OK to leave messages on Cell  [phone]         Anticoagulation Care Providers     Provider Role Specialty Phone number    Anjel Busby MD Referring Family Medicine [phone]

## 2021-12-17 ENCOUNTER — TELEPHONE (OUTPATIENT)
Dept: SLEEP MEDICINE | Facility: CLINIC | Age: 61
End: 2021-12-17
Payer: COMMERCIAL

## 2021-12-17 NOTE — TELEPHONE ENCOUNTER
Reason for call:  Other   Patient called regarding (reason for call): call back  Additional comments: Patient is requesting a call back to schedule sleep study     Phone number to reach patient:  Cell number on file:    Telephone Information:   Mobile [phone]       Best Time:  Anytimee    Can we leave a detailed message on this number?  YES    Travel screening: Not Applicable

## 2021-12-17 NOTE — TELEPHONE ENCOUNTER
LMTCB - Upon call back please schedule an appointment for PSG Split night with TCM. Patient will need to have the follow up with Dr. Greenfield and a Covid test done prior to the sleep study.

## 2021-12-17 NOTE — NURSING NOTE
LMTCB - upon call back patient will need to be scheduled for a PSG Split night w/TCM. Patient will also need a Covid test.

## 2021-12-21 ENCOUNTER — MYC MEDICAL ADVICE (OUTPATIENT)
Dept: SLEEP MEDICINE | Facility: CLINIC | Age: 61
End: 2021-12-21
Payer: COMMERCIAL

## 2021-12-23 NOTE — TELEPHONE ENCOUNTER
Patient has already been scheduled for sleep study and follow up with Dr. Greenfield. Covid order has already been placed. No further actions needed.

## 2021-12-29 ENCOUNTER — ANTICOAGULATION THERAPY VISIT (OUTPATIENT)
Dept: ANTICOAGULATION | Facility: CLINIC | Age: 61
End: 2021-12-29

## 2021-12-29 ENCOUNTER — LAB (OUTPATIENT)
Dept: LAB | Facility: CLINIC | Age: 61
End: 2021-12-29
Payer: COMMERCIAL

## 2021-12-29 DIAGNOSIS — I26.99 PULMONARY EMBOLI (H): Primary | ICD-10-CM

## 2021-12-29 DIAGNOSIS — I26.99 PULMONARY EMBOLI (H): ICD-10-CM

## 2021-12-29 DIAGNOSIS — Z79.01 LONG TERM CURRENT USE OF ANTICOAGULANT THERAPY: ICD-10-CM

## 2021-12-29 LAB — INR BLD: 2.3 (ref 0.9–1.1)

## 2021-12-29 PROCEDURE — 85610 PROTHROMBIN TIME: CPT

## 2021-12-29 PROCEDURE — 36416 COLLJ CAPILLARY BLOOD SPEC: CPT

## 2021-12-29 NOTE — PROGRESS NOTES
ANTICOAGULATION MANAGEMENT     Shira Rodriguez 61 year old female is on warfarin with therapeutic INR result. (Goal INR 2.0-3.0)    Recent labs: (last 7 days)     12/29/21  1110   INR 2.3*       ASSESSMENT     Source(s): Chart Review and Patient/Caregiver Call     Warfarin doses taken: Warfarin taken as instructed  Diet: No new diet changes identified  New illness, injury, or hospitalization: No  Medication/supplement changes: None noted  Signs or symptoms of bleeding or clotting: No  Previous INR: Therapeutic last 2(+) visits  Additional findings: None     PLAN     Recommended plan for no diet, medication or health factor changes affecting INR     Dosing Instructions: Continue your current warfarin dose with next INR in 4 weeks       Summary  As of 12/29/2021    Full warfarin instructions:  7.5 mg every Mon, Fri; 5 mg all other days   Next INR check:  1/26/2022             Telephone call with Shira who verbalizes understanding and agrees to plan and who agrees to plan and repeated back plan correctly    Lab visit scheduled    Education provided: None required    Plan made per [facility] anticoagulation protocol    Mariah Sellers RN  Anticoagulation Clinic  12/29/2021    _______________________________________________________________________     Anticoagulation Episode Summary     Current INR goal:  2.0-3.0   TTR:  67.1 % (1 y)   Target end date:  Indefinite   Send INR reminders to:  [facility]    Indications    Pulmonary emboli (H) [I26.99]  Long term current use of anticoagulant therapy [Z79.01]           Comments:  HIPPA Forms returned 11/13/19  OK to leave messages on Cell  [phone]         Anticoagulation Care Providers     Provider Role Specialty Phone number    Anjel Busby MD Referring Family Medicine [phone]

## 2022-01-06 ENCOUNTER — MEDICAL CORRESPONDENCE (OUTPATIENT)
Dept: HEALTH INFORMATION MANAGEMENT | Facility: CLINIC | Age: 62
End: 2022-01-06
Payer: COMMERCIAL

## 2022-01-26 ENCOUNTER — ANTICOAGULATION THERAPY VISIT (OUTPATIENT)
Dept: ANTICOAGULATION | Facility: CLINIC | Age: 62
End: 2022-01-26

## 2022-01-26 ENCOUNTER — LAB (OUTPATIENT)
Dept: LAB | Facility: CLINIC | Age: 62
End: 2022-01-26
Payer: COMMERCIAL

## 2022-01-26 DIAGNOSIS — I26.99 PULMONARY EMBOLI (H): ICD-10-CM

## 2022-01-26 DIAGNOSIS — Z79.01 LONG TERM CURRENT USE OF ANTICOAGULANT THERAPY: ICD-10-CM

## 2022-01-26 DIAGNOSIS — I26.99 PULMONARY EMBOLI (H): Primary | ICD-10-CM

## 2022-01-26 LAB — INR BLD: 3.3 (ref 0.9–1.1)

## 2022-01-26 PROCEDURE — 36416 COLLJ CAPILLARY BLOOD SPEC: CPT

## 2022-01-26 PROCEDURE — 85610 PROTHROMBIN TIME: CPT

## 2022-01-26 NOTE — PROGRESS NOTES
ANTICOAGULATION MANAGEMENT     Shira Rodriguez 61 year old female is on warfarin with supratherapeutic INR result. (Goal INR 2.0-3.0)    Recent labs: (last 7 days)     01/26/22  1015   INR 3.3*       ASSESSMENT     Source(s): Chart Review and Patient/Caregiver Call     Warfarin doses taken: Warfarin taken as instructed  Diet: No new diet changes identified  New illness, injury, or hospitalization: No  Medication/supplement changes: None noted  Signs or symptoms of bleeding or clotting: No  Previous INR: Therapeutic last 2(+) visits  Additional findings: None     PLAN     Recommended plan for no diet, medication or health factor changes affecting INR     Dosing Instructions: Continue your current warfarin dose with next INR in 2 weeks       Summary  As of 1/26/2022    Full warfarin instructions:  7.5 mg every Mon, Fri; 5 mg all other days   Next INR check:  2/9/2022             Telephone call with Shira who verbalizes understanding and agrees to plan    Lab visit scheduled    Education provided: Goal range and significance of current result    Plan made per [facility] anticoagulation protocol    Jess Gomez RN  Anticoagulation Clinic  1/26/2022    _______________________________________________________________________     Anticoagulation Episode Summary     Current INR goal:  2.0-3.0   TTR:  64.8 % (1 y)   Target end date:  Indefinite   Send INR reminders to:  [facility]    Indications    Pulmonary emboli (H) [I26.99]  Long term current use of anticoagulant therapy [Z79.01]           Comments:  HIPPA Forms returned 11/13/19  OK to leave messages on Cell  [phone]         Anticoagulation Care Providers     Provider Role Specialty Phone number    Anjel Busby MD Referring Family Medicine [phone]

## 2022-01-28 ENCOUNTER — LAB (OUTPATIENT)
Dept: URGENT CARE | Facility: URGENT CARE | Age: 62
End: 2022-01-28
Attending: PSYCHIATRY & NEUROLOGY
Payer: COMMERCIAL

## 2022-01-28 DIAGNOSIS — G47.33 OSA (OBSTRUCTIVE SLEEP APNEA): ICD-10-CM

## 2022-01-28 PROCEDURE — U0003 INFECTIOUS AGENT DETECTION BY NUCLEIC ACID (DNA OR RNA); SEVERE ACUTE RESPIRATORY SYNDROME CORONAVIRUS 2 (SARS-COV-2) (CORONAVIRUS DISEASE [COVID-19]), AMPLIFIED PROBE TECHNIQUE, MAKING USE OF HIGH THROUGHPUT TECHNOLOGIES AS DESCRIBED BY CMS-2020-01-R: HCPCS

## 2022-01-28 PROCEDURE — U0005 INFEC AGEN DETEC AMPLI PROBE: HCPCS

## 2022-01-29 LAB — SARS-COV-2 RNA RESP QL NAA+PROBE: NEGATIVE

## 2022-02-01 ENCOUNTER — THERAPY VISIT (OUTPATIENT)
Dept: SLEEP MEDICINE | Facility: CLINIC | Age: 62
End: 2022-02-01
Payer: COMMERCIAL

## 2022-02-01 DIAGNOSIS — G47.33 OSA (OBSTRUCTIVE SLEEP APNEA): ICD-10-CM

## 2022-02-01 PROCEDURE — 95810 POLYSOM 6/> YRS 4/> PARAM: CPT | Performed by: PSYCHIATRY & NEUROLOGY

## 2022-02-03 NOTE — PROCEDURES
" SLEEP STUDY INTERPRETATION  DIAGNOSTIC POLYSOMNOGRAPHY REPORT      Patient: XAVIER ROWELL  YOB: 1960  Study Date: 2/1/2022  MRN: [id number]  Referring Provider: MD Busby Joseph  Ordering Provider: MD Greenfield Michael    Indications for Polysomnography: The patient is a 61 year old Female who is 5' 6\" and weighs 300.0 lbs. Her BMI is 48.8, [place] sleepiness scale 15 and neck circumference is 42 cm. Relevant medical history includes PE and subsequent Right HF, morbid obesity, history of CYRUS treated with HOB elevation. A diagnostic polysomnogram was performed to evaluate efficacy of HOB elevation sleep disordered breathing.    Polysomnogram Data: A full night polysomnogram recorded the standard physiologic parameters including EEG, EOG, EMG, ECG, nasal and oral airflow. Respiratory parameters of chest and abdominal movements were recorded with respiratory inductance plethysmography. Oxygen saturation was recorded by pulse oximetry. Hypopnea scoring rule used: 1B 4%.    Sleep Architecture: Sleep fragmentation, No REM sleep  The total recording time of the polysomnogram was 470.5 minutes. The total sleep time was 293.0 minutes. Sleep latency was 20.9 minutes. REM latency was - minutes. Arousal index was 41.8 arousals per hour. Sleep efficiency was 62.3%. Wake after sleep onset was 155.5 minutes. The patient spent 19.3% of total sleep time in Stage N1, 68.9% in Stage N2, 11.8% in Stage N3, and 0.0% in REM. Time in REM supine was 0 minutes.    Respiration: With HOB elevation to 45 degrees (as at home) the patient does not demonstrate sleep disordered breathing in NREM sleep.  The patient did not have REM sleep during the study.     Events ? The polysomnogram revealed a presence of 2 obstructive, 1 central, and - mixed apneas resulting in an apnea index of 0.6 events per hour. There were 1 obstructive hypopneas and - central hypopneas resulting in an obstructive hypopnea index of 0.2 and central " hypopnea index of - events per hour. The combined apnea/hypopnea index was 0.8 events per hour (central apnea/hypopnea index was 0.2 events per hour). The REM AHI was - events per hour. The supine AHI was 0.8 events per hour. The RERA index was 0.2 events per hour.  The RDI was 1.0 events per hour.    Snoring - was reported as minimal.    Respiratory rate and pattern - was notable for normal respiratory rate and pattern.    Sustained Sleep Associated Hypoventilation - Transcutaneous carbon dioxide monitoring was used, however significant hypoventilation was not present with a maximum change from 34.3 to 40.4 mmHg and 0 minutes at or greater than 55 mmHg.    Sleep Associated Hypoxemia - (Greater than 5 minutes O2 sat at or below 88%) was not present. Baseline oxygen saturation was 95.4%. Lowest oxygen saturation was 89.3%. Time spent less than or equal to 88% was 0 minutes. Time spent less than or equal to 89% was 0 minutes.    Movement Activity:     Periodic Limb Activity - There were 0 PLMs during the entire study.    REM EMG Activity - No REM to evaluate.    Nocturnal Behavior - Abnormal sleep related behaviors were not noted.    Bruxism - None apparent.    Cardiac Summary: Limited 1 lead EKG.  Demonstrated predominantly narrow QRS complexes preceded by P waves suggestive of sinus rhythm. Occasional wide QRS complexes suggestive of PVCs. Intermittent tachycardia.  The average pulse rate was 63.6 bpm. The minimum pulse rate was 52.9 bpm while the maximum pulse rate was 101.0 bpm.      Assessment:     With HOB elevation to 45 degrees (as at home) the patient does not demonstrate sleep disordered breathing in NREM sleep.  The patient did not have REM sleep during the study.    Recommendations:    This study can be partially reassuring that elevating the HOB treats the patients sleep disordered breathing during NREM sleep, however considering the absence of REM sleep consider repeating polysomnography with TCM to  capture REM sleep.    Advice regarding the risks of drowsy driving.    Suggest optimizing sleep schedule and avoiding sleep deprivation.    Weight management     Diagnostic Codes: G47.33, G47.9      Teofilo Greenfield MD 2-3-22  Diplomate, ABPN Sleep Medicine

## 2022-02-09 ENCOUNTER — ANTICOAGULATION THERAPY VISIT (OUTPATIENT)
Dept: ANTICOAGULATION | Facility: CLINIC | Age: 62
End: 2022-02-09

## 2022-02-09 ENCOUNTER — LAB (OUTPATIENT)
Dept: LAB | Facility: CLINIC | Age: 62
End: 2022-02-09
Payer: COMMERCIAL

## 2022-02-09 DIAGNOSIS — I26.99 PULMONARY EMBOLI (H): Primary | ICD-10-CM

## 2022-02-09 DIAGNOSIS — Z79.01 LONG TERM CURRENT USE OF ANTICOAGULANT THERAPY: ICD-10-CM

## 2022-02-09 DIAGNOSIS — I26.99 PULMONARY EMBOLI (H): ICD-10-CM

## 2022-02-09 LAB — INR BLD: 3.5 (ref 0.9–1.1)

## 2022-02-09 PROCEDURE — 36416 COLLJ CAPILLARY BLOOD SPEC: CPT

## 2022-02-09 PROCEDURE — 85610 PROTHROMBIN TIME: CPT

## 2022-02-09 NOTE — PROGRESS NOTES
ANTICOAGULATION MANAGEMENT     Shira Rodriguez 61 year old female is on warfarin with supratherapeutic INR result. (Goal INR 2.0-3.0)    Recent labs: (last 7 days)     02/09/22  1030   INR 3.5*       ASSESSMENT     Source(s): Chart Review     Warfarin doses taken: Reviewed in chart  Diet: No new diet changes identified  New illness, injury, or hospitalization: No  Medication/supplement changes: None noted  Signs or symptoms of bleeding or clotting: No  Previous INR: Supratherapeutic  Additional findings: None     PLAN     Recommended plan for no diet, medication or health factor changes affecting INR     Dosing Instructions:  Decrease your warfarin dose (6% change) with next INR in 2 weeks       Summary  As of 2/9/2022    Full warfarin instructions:  7.5 mg every Mon; 5 mg all other days   Next INR check:  2/23/2022             Detailed voice message left for Shria with dosing instructions and follow up date.     Contact [phone] to schedule and with any changes, questions or concerns.     Education provided: Please call back if any changes to your diet, medications or how you've been taking warfarin and Contact [phone] with any changes, questions or concerns.     Plan made per [facility] anticoagulation protocol    Mariah Sellers RN  Anticoagulation Clinic  2/9/2022    _______________________________________________________________________     Anticoagulation Episode Summary     Current INR goal:  2.0-3.0   TTR:  61.0 % (1 y)   Target end date:  Indefinite   Send INR reminders to:  [facility]    Indications    Pulmonary emboli (H) [I26.99]  Long term current use of anticoagulant therapy [Z79.01]           Comments:  HIPPA Forms returned 11/13/19  OK to leave messages on Cell  [phone]         Anticoagulation Care Providers     Provider Role Specialty Phone number    Anjel Busby MD Referring Family Medicine [phone]

## 2022-03-10 LAB — SLPCOMP: NORMAL

## 2022-03-14 ENCOUNTER — VIRTUAL VISIT (OUTPATIENT)
Dept: SLEEP MEDICINE | Facility: CLINIC | Age: 62
End: 2022-03-14
Payer: COMMERCIAL

## 2022-03-14 VITALS — BODY MASS INDEX: 47.09 KG/M2 | HEIGHT: 66 IN | WEIGHT: 293 LBS

## 2022-03-14 DIAGNOSIS — F51.04 PSYCHOPHYSIOLOGICAL INSOMNIA: Primary | ICD-10-CM

## 2022-03-14 PROCEDURE — 99214 OFFICE O/P EST MOD 30 MIN: CPT | Mod: 95 | Performed by: PSYCHIATRY & NEUROLOGY

## 2022-03-14 ASSESSMENT — SLEEP AND FATIGUE QUESTIONNAIRES
HOW LIKELY ARE YOU TO NOD OFF OR FALL ASLEEP WHILE LYING DOWN TO REST IN THE AFTERNOON WHEN CIRCUMSTANCES PERMIT: MODERATE CHANCE OF DOZING
HOW LIKELY ARE YOU TO NOD OFF OR FALL ASLEEP WHILE SITTING INACTIVE IN A PUBLIC PLACE: SLIGHT CHANCE OF DOZING
HOW LIKELY ARE YOU TO NOD OFF OR FALL ASLEEP WHILE SITTING AND TALKING TO SOMEONE: WOULD NEVER DOZE
HOW LIKELY ARE YOU TO NOD OFF OR FALL ASLEEP WHILE SITTING AND READING: MODERATE CHANCE OF DOZING
HOW LIKELY ARE YOU TO NOD OFF OR FALL ASLEEP IN A CAR, WHILE STOPPED FOR A FEW MINUTES IN TRAFFIC: WOULD NEVER DOZE
HOW LIKELY ARE YOU TO NOD OFF OR FALL ASLEEP WHEN YOU ARE A PASSENGER IN A CAR FOR AN HOUR WITHOUT A BREAK: MODERATE CHANCE OF DOZING
HOW LIKELY ARE YOU TO NOD OFF OR FALL ASLEEP WHILE SITTING QUIETLY AFTER LUNCH WITHOUT ALCOHOL: MODERATE CHANCE OF DOZING
HOW LIKELY ARE YOU TO NOD OFF OR FALL ASLEEP WHILE WATCHING TV: MODERATE CHANCE OF DOZING

## 2022-03-14 ASSESSMENT — PAIN SCALES - GENERAL: PAINLEVEL: NO PAIN (0)

## 2022-03-14 NOTE — PROGRESS NOTES
Cardiology Progress Note    Assessment & Plan      59 year old female with a history of hypertension, hyperlipidemia, sleep apnea, morbid obesity, and chronic bronchitis who presents to the emergency department via EMS for evaluation of respiratory distress s/p intubation and PEA arrest with CTA consistent with bilateral PE transferred here for [facility] ECMO thrombectomy and catheter directed lytics on 9/9, went for [facility] ECMO decannulation on 9/17 complicated by hypercarbic respiratory failure s/p VV ECMO through RIJ-FV bypass s/p decannulation on 9/19.    Plan for today  - on heparin ggt for PE  - wean sedation to work towards extubation  - reculture given low fevers  - PT/PT      Neurology: Intubated, sedated  --continue versed, fentanyl, precedex - hypotensive with propofol   - initial CTH no acute changes, moving when sedation is weaned down and following commands   - weaning sedation post decannulation to extubate   Cardiovascular / Hemodynamics: PEA arrest secondary to massive PE.   CTA-PE study demonstrates bilateral distal main and proximal lobar pulmonary emboli, greater on the right with elevated RV/LV ratio. Peripheral V-A ECMO inserted for hemodynamic support for massive PE. Went to cath lab and then to IR for suction mechanical thrombectomy.  LA 8.6   TTE: dilated RV, severely reduced. Turn down 9/16 LVEF 45-55%, RV function mildly reduced and trivial pericardial effusion stable gases.   EKG: sinus tachy  --heparin gtt  - on dobutamine at 2.5   Pulmonary: PEA arrest secondary to massive PE.   History of heavy smoking   Ventilation Mode: CMV/AC  (Continuous Mandatory Ventilation/ Assist Control)  FiO2 (%): 50 %  Rate Set (breaths/minute): 12 breaths/min  Tidal Volume Set (mL): 450 mL  PEEP (cm H2O): 8 cmH2O  Oxygen Concentration (%): 50 %  Resp: 20  -s/p thrombectomy and catheter directed lytics 9/9-9/10  Chest CT Subpleural groundglass attenuation in the right lung, possibly  pulmonary infarcts given previously  seen extensive pulmonary emboli.   Patchy nodularity in the right base suggesting aspiration.  - Now weaning vent requirements. CXR showing white out of left lung- bronched without mucous plugs, pulmonary edema. CT chest showed L pleural effusion increased R pleural effusion and some concern for tracheomalacia?  CXR: Lines in stable position.   - prednisone 40/d X5 d for COPD exacerbation in the setting of pneumonia   --- scheduled duonebs    GI and Nutrition: No known medical hx.    --monitor BID LFTs  - nutrition consult   --GI Prophylaxis: PPI    Renal, Fluid and Electrolytes: Cr at baseline  UOP continue to monitor.  - volume overloaded will increase diuresis with 80mg q6h  --maintain K>3 and Mg>2    Infectious Disease: Aspiration pneumonia -- sputum culture MSSA  --vancomycin/zosyn x5 days for ECMO, vanc last dose 9/15, will continued on cefazolin, on 9/17 went for [facility] ECMO decannulation which was complicated by hypercarbic respiratory failure and was therefore transitioned to VV ECMO  - CXR with pulmonary edema got 1.4L of crystalloid in the OR and two units of product. Restarted diureses and broadened back to pip/tazo with repeat cultures.   - febrile 9/10 re- cultured    Hematology and Oncology: Receiving heparin for  PE.   -heparin ggt    Endocrinology: No known medical history. BG elevated.  --insulin gtt   Lines: R radial arterial line September 9, 2019  RIJ Sept 9 2019  ETT September 9, 2019  Underwood catheter September 9, 2019  OG tube September 9, 2019  Restraint: needed    Current lines are required for patient management       Family update by me today: Yes      Code Status:     The pt was discussed and evaluated with Dr. Tk Chilel,     Interval History   decannulated yesterday at bedside without incidents  On heparin ggt post decannulation   Started steroid burst for COPD    Ventilation Mode: CMV/AC  (Continuous Mandatory Ventilation/ Assist Control)  FiO2 (%): 50 %  Rate  "Set (breaths/minute): 12 breaths/min  Tidal Volume Set (mL): 450 mL  PEEP (cm H2O): 8 cmH2O  Oxygen Concentration (%): 50 %  Resp: 20        Physical Exam   Temp: 100.2  F (37.9  C) Temp src: Esophageal     Heart Rate: 96 Resp: 20 SpO2: 97 % O2 Device: Mechanical Ventilator    Vitals:    09/18/19 0200 09/19/19 0200 09/20/19 0530   Weight: (!) 180 kg (396 lb 13.3 oz) (!) 179 kg (394 lb 10 oz) (!) 176.5 kg (389 lb 1.8 oz)     Vital Signs with Ranges  Temp:  [95.7  F (35.4  C)-100.4  F (38  C)] 100.2  F (37.9  C)  Heart Rate:  [73-98] 96  Resp:  [17-23] 20  MAP:  [62 mmHg-91 mmHg] 66 mmHg  Arterial Line BP: ()/(43-85) 97/51  FiO2 (%):  [50 %-60 %] 50 %  SpO2:  [92 %-99 %] 97 %  I/O last 3 completed shifts:  In: 4907.27 [I.V.:2447.27; NG/GT:570]  Out: 8250 [Urine:7200; Emesis/NG output:850; Stool:200]    Heart Rate: 96, Blood pressure 130/68, pulse 86, temperature 100.2  F (37.9  C), resp. rate 20, height 1.676 m (5' 5.98\"), weight (!) 176.5 kg (389 lb 1.8 oz), SpO2 97 %, not currently breastfeeding.  389 lbs 1.79 oz  GEN:  Morbidly obese sedated and intubated  CV:  Distant heart sounds   LUNGS:  Decreased breath sounds  ABD:  Active bowel sounds, soft, non-tender/non-distended.  No rebound/guarding/rigidity.  EXT:  No edema or cyanosis.  ECMO decannulation sites clean withouot active bleeding   Underwood in place    Medications     dexmedetomidine 0.5 mcg/kg/hr (09/20/19 1029)     IV fluid REPLACEMENT ONLY       IV fluid REPLACEMENT ONLY       DOBUTamine 2.5 mcg/kg/min (09/20/19 1028)     fentaNYL 100 mcg/hr (09/20/19 1000)     HEParin 2,200 Units/hr (09/20/19 1030)     - MEDICATION INSTRUCTIONS -       midazolam 4 mg/hr (09/20/19 1028)     - MEDICATION INSTRUCTIONS -       norepinephrine Stopped (09/20/19 0900)     - MEDICATION INSTRUCTIONS -         acetylcysteine  2 mL Nebulization Q4H     insulin aspart  1-6 Units Subcutaneous Q4H     ipratropium - albuterol 0.5 mg/2.5 mg/3 mL  3 mL Nebulization Q4H     " miconazole   Topical BID     multivitamin w/minerals  1 tablet Oral or Feeding Tube Daily     pantoprazole (PROTONIX) IV  40 mg Intravenous BID     piperacillin-tazobactam  3.375 g Intravenous Q6H     predniSONE  40 mg Oral Daily     sodium chloride (PF)  3 mL Intracatheter Q8H     vitamin B1  100 mg Oral or Feeding Tube Daily       Data   Recent Labs   Lab 09/20/19  0828 09/20/19  0800 09/20/19  0344  09/19/19  2237 09/19/19  1931  09/19/19  1549   WBC  --   --  15.7*  --  17.0* 16.7*  --  13.1*   HGB  --   --  8.9*  --  9.2* 8.8*   < > 9.2*   MCV  --   --  93  --  92 93  --  93   PLT  --   --  120*  --  150 145*   < > 54*   INR  --   --  1.22*  --  1.33*  --   --  1.42*     --  141  --  140  --   --  138   POTASSIUM 3.7 3.8 4.0  4.0   < > 4.3 4.6  --  4.7  4.8   CHLORIDE 101  --  102  --  99  --   --  99   CO2 33*  --  32  --  33*  --   --  29   BUN 53*  --  47*  --  47*  --   --  41*   CR 1.04  --  1.03  --  0.95  --   --  0.93   ANIONGAP 7  --  6  --  8  --   --  10   MARIA INES 9.2  --  9.6  --  9.7  --   --  9.4   * 108* 122*  124*   < > 130* 158*  --  169*  180*   ALBUMIN  --   --  2.3*  --   --   --   --  2.3*   PROTTOTAL  --   --  6.7*  --   --   --   --  6.9   BILITOTAL  --   --  0.6  --   --   --   --  0.8   ALKPHOS  --   --  106  --   --   --   --  98   ALT  --   --  24  --   --   --   --  21   AST  --   --  20  --   --   --   --  18   TROPI  --   --  <0.015  --  <0.015  --   --  <0.015    < > = values in this interval not displayed.       No results found for this or any previous visit (from the past 24 hour(s)).      I have seen and examined the patient with the [facility] team. I agree with the assessment and plan of the note above.I have reviewed pertinent labs.     Raffaele Stuart MD  Interventional Cardiology  Pager: [phone]     No

## 2022-03-14 NOTE — PROGRESS NOTES
Shira is a 61 year old who is being evaluated via a billable video visit.      How would you like to obtain your AVS? MyChart  If the video visit is dropped, the invitation should be resent by: Send to e-mail at: [email or web address]  Will anyone else be joining your video visit? Shiela Saldana    Video Start Time: 1055  Video-Visit Details    Type of service:  Video Visit    Video End Time:1110    Originating Location (pt. Location): Home    Distant Location (provider location):  [facility]     Platform used for Video Visit: Mysterio     Brief sleep staff note    Discussed the results of her sleep study which demonstrated an absence of sleep disordered breathing in NREM sleep if she slept with HOB up 45 degrees.      While the results are reassuring they are confusing to the patient who had severe CYRUS on a previous study (without HOB elevation) and they dont explain how tired she is. We discussed this at length.     We discussed repeating a PSG with zolpidem ER 6.25mg but I cautioned against Rx this chronically.     We reviewed her troubles falling asleep and she describes a bit of a circadian delay (she goes to bed at midnight, does not fall asleep until 2am and then will seep until 10am). Therapy for this is 10,000 lux light therapy in the AM along with 1mg of melatonin several hours before bedtime.  She will give this a try.     She is also willing to see Dr Lawler to help with insomnia but would like to hold off on repeating her PSG for now.     If she changes her mind she will mychart me and we will again have her sleep with the HOB elevated but Rx zolpidem at 6.25mg ER for the study.      All questions were answered.    It is a great privilege being asked to participate in this patients care.  The patient has been advised on the importance in of never operating operating a motor vehicle while tired or sleepy.        I visited with the patient directly but also extensively reviewed  chart and coordinated care. Total time spent in the care of this patient today (Face-to-Face time + chart time, , and coordination of care) was 35 minutes.

## 2022-03-23 ENCOUNTER — TELEPHONE (OUTPATIENT)
Dept: ANTICOAGULATION | Facility: CLINIC | Age: 62
End: 2022-03-23
Payer: COMMERCIAL

## 2022-03-23 NOTE — TELEPHONE ENCOUNTER
ANTICOAGULATION     Shira Rodriguez is overdue for INR check.      Left message for patient to call and schedule lab appointment as soon as possible. If returning call, please schedule.     YUNIER DAVIDSON RN

## 2022-03-30 ENCOUNTER — TELEPHONE (OUTPATIENT)
Dept: ANTICOAGULATION | Facility: CLINIC | Age: 62
End: 2022-03-30
Payer: COMMERCIAL

## 2022-03-30 NOTE — TELEPHONE ENCOUNTER
ANTICOAGULATION     Shira Rodriguez is overdue for INR check.      Spoke with Shira  and scheduled lab appointment on 4/7/22    YUNIER DAVIDSON RN

## 2022-04-07 ENCOUNTER — LAB (OUTPATIENT)
Dept: LAB | Facility: CLINIC | Age: 62
End: 2022-04-07
Payer: COMMERCIAL

## 2022-04-07 ENCOUNTER — ANTICOAGULATION THERAPY VISIT (OUTPATIENT)
Dept: ANTICOAGULATION | Facility: CLINIC | Age: 62
End: 2022-04-07

## 2022-04-07 DIAGNOSIS — I26.99 PULMONARY EMBOLI (H): ICD-10-CM

## 2022-04-07 DIAGNOSIS — I26.99 PULMONARY EMBOLI (H): Primary | ICD-10-CM

## 2022-04-07 DIAGNOSIS — Z79.01 LONG TERM CURRENT USE OF ANTICOAGULANT THERAPY: ICD-10-CM

## 2022-04-07 LAB — INR BLD: 2.6 (ref 0.9–1.1)

## 2022-04-07 PROCEDURE — 85610 PROTHROMBIN TIME: CPT

## 2022-04-07 PROCEDURE — 36416 COLLJ CAPILLARY BLOOD SPEC: CPT

## 2022-04-07 NOTE — PROGRESS NOTES
ANTICOAGULATION MANAGEMENT     Shira Rodriguez 61 year old female is on warfarin with therapeutic INR result. (Goal INR 2.0-3.0)    Recent labs: (last 7 days)     04/07/22  1057   INR 2.6*       ASSESSMENT       Source(s): Chart Review and Patient/Caregiver Call       Warfarin doses taken: Warfarin taken as instructed    Diet: No new diet changes identified    New illness, injury, or hospitalization: No    Medication/supplement changes: None noted    Signs or symptoms of bleeding or clotting: No    Previous INR: Supratherapeutic    Additional findings: None       PLAN     Recommended plan for no diet, medication or health factor changes affecting INR     Dosing Instructions: continue your current warfarin dose with next INR in 4 weeks       Summary  As of 4/7/2022    Full warfarin instructions:  7.5 mg every Mon; 5 mg all other days   Next INR check:  5/5/2022             Telephone call with Shira who verbalizes understanding and agrees to plan    Contact [phone] to schedule and with any changes, questions or concerns.     Education provided: Please call back if any changes to your diet, medications or how you've been taking warfarin and Contact [phone] with any changes, questions or concerns.     Plan made per [facility] anticoagulation protocol    Tea Deng, RN  Anticoagulation Clinic  4/7/2022    _______________________________________________________________________     Anticoagulation Episode Summary     Current INR goal:  2.0-3.0   TTR:  66.1 % (1 y)   Target end date:  Indefinite   Send INR reminders to:  [facility]    Indications    Pulmonary emboli (H) [I26.99]  Long term current use of anticoagulant therapy [Z79.01]           Comments:  HIPPA Forms returned 11/13/19  OK to leave messages on Cell  [phone]         Anticoagulation Care Providers     Provider Role Specialty Phone number    Anjel Busby MD Referring Family Medicine [phone]

## 2022-05-10 NOTE — DISCHARGE SUMMARY
General acute hospital   Acute Rehabilitation Unit  Discharge summary     Date of Admission: 10/7/2019  Date of Discharge: 10/14/2019  Disposition: home  Primary Care Physician: Anjel Busby  Attending physician: Chayito Cardona MD  Other significant physician provider(s): Carli Salas PA-C      discharge diagnosis  Pulmonary Embolism  PEA Arrest  Bilateral pleural effusions  Volume overload  Sinus pause  HTN  Sleep apnea  Left groin wound  Pannus wound      brief summary  Shira Rodriguez is a 59 year old woman who presented to [facility] 9/9 via EMS with acute respiratory failure was intubated developed PEA arrest, found to have massive pulmonary embolism, echo with RV failure. Admitted to [facility] for ECMO and thrombectomy, course complicated by hypoxic respiratory failure, pleural effusions, pneumonia, thrombocytopenia, hypervolemia, and deconditioning. Admitted to acute rehab 10/7 for ongoing rehabilitation and medical management.      Past medical history significant for HTN, HLD, sleep apnea, morbid obesity, Rheumatoid Arthritis, Depression, and chronic bronchitis.      rehabilitation course  PT: Continued therapy is recommended.  Rationale/Recommendations:  Pt is now MOD I in room with WW and is now safe to d/c to home. She amb up to 200 ft without resting, which is adequate to access her apartment. Still has significant endurance deficits and gait deficit below baseline. Recommending ongoing OP PT after d/c to maximize physical performance and safety. .      Improved timed stand from 1:03 on 10/9 to 2:37 on 10/14.      OT:No further therapy is recommended. Pt is mod I for basic adl. No additional OT recommended at this time unless there is functional decline. Pt is declining AE for dressing. Pt's mom will stay with her initially for support.     mEDICAL COURSE       # PEA Arrest 2/2 massive pulmonary embolism-  # Acute Hypoxemic Respiratory Failure,  improved- s/p VA ECMO IR thrombectomy 9/9 decannulated 9/17 with placement of VV ECMO 9/17-9/19   - respiratory status further complicated by  hx Chronic Bronchitis, Bilateral pleural effusions, hypervolemia, PNA (completed treatment). Stable on room air  -lifelong anticoagulation recommended currently on warfarin inr goal 2-3   - received warfarin education during rehab stay and will establish at warfarin clinic upon discharge-         # Bilateral Plueral Effusions, small L>R  # Volume Overload, likely 2/2 acute RV failure (resolved) and ECMO resuscitation, hypoalbuminemia  -continue lasix 80 mg daily-  20 mEQ potassium started 10/14 due to downtrending potassium lab- repeat bmp within one week with f/u pcp.         # Sinus Pause- 3.6 seconds on telemetry 9/29 while patient sleeping.  Discussed with EP  BB discontinued, felt may be related to apnea  -optimize CYRUS tx as outpatient.    - Ziopatch x14 days placed 10/7- patient did not tolerate and removed prior to completion  -recommend f/u Dr. Valadez- EP     # Afib/Aflutter- Noted to be in atrial fibrillation while in the ICU, felt possibly 2/2 to Dobutamine which was discontinued 9/23 and patient was given amiodarone bolus at that time. She converted to sinus. 9/25 she went into atrial flutter with variable block. Again was given an amio bolus, fluid bolus, and IV metop x1. Continues to intermittently go into a-fib - rates now controlled. Event monitor though tolerated for <1 week.  - holding BB due to sinus pause as above.  - warfarin as above     # HTN   SBP overall at goal.   - Lasix 80 mg daily & lisinopril 5 mg daily  - f/u pcp/ cardiology     #Sleep Apnea- with inability to tolerate CPAP in the past reports sleep study in 4/2019.   -should f/u in sleep medicine clinic      # COPD- chronic bronchitis with tobacco abuse ~ 1 ppd prior to admission     # History of depression- long standing; was followed by psychology and psychiatry teams prior to  admission.   - continue Cymbalta (it was held initially and then gradually titrated to home dose).   -continue abilify 5 mg daily- follow up psychiatry     # HLD  - continue Atorvastatin 40mg     # Rheumatoid Arthritis- followed by Dr. Austin; diagnosed about 1.5 years ago.   - continue PTA methotrexate/Wellcovorin   - Continue flexeril and tylenol prn     # Pannus Wound-  wound care as ordered.  # Wound at the left ECMO site: WOCN /pcp to follow. Patients mom learned wound care prior to discharge with recommended WOCN follow up upon discharge.   dISCHARGE MEDICATIONS  Current Discharge Medication List      START taking these medications    Details   acetaminophen (TYLENOL) 325 MG tablet Take 2 tablets (650 mg) by mouth every 6 hours as needed for mild pain or fever    Associated Diagnoses: Osteoarthritis of right knee, unspecified osteoarthritis type      ARIPiprazole (ABILIFY) 5 MG tablet Take 1 tablet (5 mg) by mouth daily  Qty: 30 tablet, Refills: 0    Associated Diagnoses: Adjustment disorder with depressed mood      diphenhydrAMINE-zinc acetate (BENADRYL) 2-0.1 % external cream Apply topically 3 times daily as needed for itching    Associated Diagnoses: Pruritic disorder      potassium chloride ER (K-DUR/KLOR-CON M) 20 MEQ CR tablet Take 1 tablet (20 mEq) by mouth daily  Qty: 30 tablet, Refills: 0    Associated Diagnoses: Cardiac arrest (H)      ranitidine (ZANTAC) 150 MG tablet Take 1 tablet (150 mg) by mouth 2 times daily  Qty: 60 tablet, Refills: 0    Associated Diagnoses: Preventative health care         CONTINUE these medications which have CHANGED    Details   atorvastatin (LIPITOR) 40 MG tablet Take 1 tablet (40 mg) by mouth every evening  Qty: 30 tablet, Refills: 0    Associated Diagnoses: Hyperlipidemia LDL goal <100      DULoxetine (CYMBALTA) 60 MG capsule Take 1 capsule (60 mg) by mouth daily  Qty: 30 capsule, Refills: 0    Associated Diagnoses: Adjustment disorder with depressed mood      folic  acid (FOLVITE) 1 MG tablet Take 4 tablets (4 mg) by mouth daily Take 3-4 tablets daily  Qty: 120 tablet, Refills: 0    Associated Diagnoses: High risk medication use; Rheumatoid arthritis involving multiple sites with positive rheumatoid factor (H)      furosemide (LASIX) 80 MG tablet Take 1 tablet (80 mg) by mouth daily  Qty: 30 tablet, Refills: 0    Associated Diagnoses: Pulmonary embolism with acute cor pulmonale, unspecified chronicity, unspecified pulmonary embolism type (H); SANTOS (dyspnea on exertion)      leucovorin (WELLCOVORIN) 5 MG tablet Take 1 tablet (5 mg) by mouth every 7 days *take 24h after taking weekly methotrexate dose  Qty: 4 tablet, Refills: 0    Associated Diagnoses: Adverse effect of methotrexate, initial encounter      lisinopril (PRINIVIL/ZESTRIL) 5 MG tablet Take 1 tablet (5 mg) by mouth daily  Qty: 30 tablet, Refills: 0    Associated Diagnoses: Cardiac arrest (H)      methotrexate 50 MG/2ML injection CHEMO Inject 1 mL (25 mg) Subcutaneous every 7 days  Qty: 4 mL, Refills: 0    Associated Diagnoses: Rheumatoid arthritis involving both hands with positive rheumatoid factor (H)      multivitamin w/minerals (THERA-VIT-M) tablet 1 tablet by Oral or Feeding Tube route daily  Qty: 30 tablet, Refills: 0    Associated Diagnoses: Morbid obesity (H)      vitamin B1 (THIAMINE) 100 MG tablet 1 tablet (100 mg) by Oral or Feeding Tube route daily  Qty: 30 tablet, Refills: 0    Associated Diagnoses: Morbid obesity (H)      warfarin ANTICOAGULANT (COUMADIN) 5 MG tablet Take 7.5 mg (1 and 1/2 tablets) by mouth on Monday, Wednesday and Friday Evenings.  Take 5 mg (1 tablet) by mouth all other days.  Qty: 50 tablet, Refills: 0    Associated Diagnoses: Pulmonary embolism with acute cor pulmonale, unspecified chronicity, unspecified pulmonary embolism type (H)         CONTINUE these medications which have NOT CHANGED    Details   cyclobenzaprine (FLEXERIL) 10 MG tablet Take 1 tablet (10 mg) by mouth nightly as  "needed for muscle spasms  Qty: 30 tablet, Refills: 1    Associated Diagnoses: Lumbar radicular pain      insulin syringe-needle U-100 (BD INSULIN SYRINGE ULTRAFINE) 30G X 1/2\" 1 ML For methotrexate use weekly  Qty: 8 each, Refills: prn    Associated Diagnoses: Rheumatoid arthritis involving multiple sites with positive rheumatoid factor (H)      nystatin (MYCOSTATIN) 857873 UNIT/GM external powder Apply to coat rash tid, neck and breast area rashes  Qty: 60 g, Refills: 3    Associated Diagnoses: Rash         STOP taking these medications       acetylcysteine (MUCOMYST) 20 % neb solution Comments:   Reason for Stopping:         methylPREDNISolone (MEDROL DOSEPAK) 4 MG tablet therapy pack Comments:   Reason for Stopping:         pantoprazole (PROTONIX) 40 MG EC tablet Comments:   Reason for Stopping:         varenicline (CHANTIX STARTING MONTH PAK) 0.5 MG X 11 & 1 MG X 42 tablet Comments:   Reason for Stopping:                 DISCHARGE INSTRUCTIONS AND FOLLOW UP  Discharge Procedure Orders   INR Clinic Referral     Physical Therapy Referral   Standing Status: Future   Referral Priority: Routine Referral Type: Rehab Therapy Physical Therapy   Number of Visits Requested: 1     Occupational Therapy Referral   Standing Status: Future   Referral Priority: Routine Referral Type: Occupational Therapy   Number of Visits Requested: 1     Physical Therapy Referral   Standing Status: Future   Referral Priority: Routine Referral Type: Rehab Therapy Physical Therapy   Number of Visits Requested: 1     SLEEP EVALUATION & MANAGEMENT REFERRAL - [facility] [phone]  (Age 18 and up)   Standing Status: Future Standing Exp. Date: 10/14/20   Referral Priority: Routine   Number of Visits Requested: 1     Reason for your hospital stay   Order Comments: Admitted for rehabilitation following prolonged and complicated hospital stay following cardiac arrest secondary to pulmonary embolism     Adult [facility] " "Follow-up and recommended labs and tests   Order Comments: Repeat INR on or before 10/16  Follow up with primary care as scheduled  Follow up with sleep medicine- follow up apnea and discuss alternative cpap devices  Follow up electrophysiology-       Appointments on [place] and/or [facility] (with [facility] or [facility] provider or service). Call [phone] if you haven't heard regarding these appointments within 7 days of discharge.     Activity   Order Comments: Your activity upon discharge: activity as tolerated up with walker     Order Specific Question Answer Comments   Is discharge order? Yes      Wound care   Order Comments: L) groin- Remove old dressing very gently.  Cleanse with Microklenz and pat dry  Cut a piece of polymem (#[id number]) to fit into the wound bed and place it. Ensure to cover entire wound bed.  Cover with 4x4 gauze or ABD depending upon drainage.  Change dressing every day.     Wound care   Order Comments: Abdominal pannus ( including R soni with healing fissure)-daily.Clenase with Microklenz,     Cut a piece of interdry (#[id number]) and place it as a single layer     Ensure to leave at least 2\" of tail beyond the fold to promote moisture wicking.    May leave same interdry for 5 days if not soiled.     Follow Up ([facility])   Order Comments: Follow up with primary care provider, Anjel Busby, as scheduled 10/21 The following labs/tests are recommended: BMP.      Appointments on [place] and/or [facility] (with [facility] or [facility] provider or service). Call [phone] if you haven't heard regarding these appointments within 7 days of discharge.     Full Code     Order Specific Question Answer Comments   Code status determined by: Discussion with patient/legal decision maker      Diet   Order Comments: Follow this diet upon discharge: recommend low sodium, low saturated fat and cholesterol diet     Order Specific Question Answer Comments   Is discharge order? Yes           physical " examination    Most recent Vital Signs:   Vitals:    10/13/19 0857 10/13/19 1605 10/14/19 0700 10/14/19 0800   BP: 136/68 (!) 140/60  131/66   BP Location: Left arm Left arm  Left arm   Pulse: 120 94  94   Resp:  18  20   Temp:  96.6  F (35.9  C)  97.3  F (36.3  C)   TempSrc:  Oral  Oral   SpO2: 94% 96%  95%   Weight:   (!) 169.2 kg (373 lb)    Height:       General: awake alert nad  Resp: non labored clear diminished on room air  CV: rrr   Ab: soft non tender  Extremities: warm with trace ble edema chronic skin discoloration at anterior legs  MSK/Neuro: awake alert speech clear moves all extremities against gravity.       45 minutes spent in discharge, including >50% in counseling and coordination of care, medication review and plan of care recommended on follow up.   Discharge summary and epic inbox message sent to Anjel Busby (PCP) at the time of discharge, so as to bridge from hospital to outpatient care.     It was our pleasure to care for Shira Rodriguez during this hospitalization. Please do not hesitate to contact me should there be questions regarding the hospital course or discharge plan.          Carli Salas PA-C  Physical Medicine and Rehabilitation   [phone]

## 2022-05-12 ENCOUNTER — TELEPHONE (OUTPATIENT)
Dept: ANTICOAGULATION | Facility: CLINIC | Age: 62
End: 2022-05-12
Payer: COMMERCIAL

## 2022-05-12 NOTE — TELEPHONE ENCOUNTER
ANTICOAGULATION     Shira Rodriguez is overdue for INR check.      Spoke with Shira and scheduled lab appointment on 5/13/22    YUNIER DAVIDSON RN

## 2022-05-13 ENCOUNTER — LAB (OUTPATIENT)
Dept: LAB | Facility: CLINIC | Age: 62
End: 2022-05-13
Payer: COMMERCIAL

## 2022-05-13 ENCOUNTER — ANTICOAGULATION THERAPY VISIT (OUTPATIENT)
Dept: ANTICOAGULATION | Facility: CLINIC | Age: 62
End: 2022-05-13

## 2022-05-13 DIAGNOSIS — I26.99 PULMONARY EMBOLI (H): ICD-10-CM

## 2022-05-13 DIAGNOSIS — Z79.01 LONG TERM CURRENT USE OF ANTICOAGULANT THERAPY: ICD-10-CM

## 2022-05-13 DIAGNOSIS — I26.99 PULMONARY EMBOLI (H): Primary | ICD-10-CM

## 2022-05-13 LAB — INR BLD: 3.2 (ref 0.9–1.1)

## 2022-05-13 PROCEDURE — 36416 COLLJ CAPILLARY BLOOD SPEC: CPT

## 2022-05-13 PROCEDURE — 85610 PROTHROMBIN TIME: CPT

## 2022-05-13 NOTE — PROGRESS NOTES
ANTICOAGULATION MANAGEMENT     Shira Rodriguez 62 year old female is on warfarin with supratherapeutic INR result. (Goal INR 2.0-3.0)    Recent labs: (last 7 days)     05/13/22  1417   INR 3.2*       ASSESSMENT       Source(s): Chart Review and Patient/Caregiver Call       Warfarin doses taken: Warfarin taken as instructed    Diet: No new diet changes identified    New illness, injury, or hospitalization: No    Medication/supplement changes: None noted    Signs or symptoms of bleeding or clotting: No    Previous INR: Therapeutic last visit; previously outside of goal range    Additional findings: None       PLAN     Recommended plan for no diet, medication or health factor changes affecting INR     Dosing Instructions: decrease your warfarin dose (6.7% change) with next INR in 2 weeks       Summary  As of 5/13/2022    Full warfarin instructions:  5 mg every day   Next INR check:  5/24/2022             Telephone call with Shira who agrees to plan and repeated back plan correctly    Check at provider office visit    Education provided: Goal range and significance of current result and Contact [phone] with any changes, questions or concerns.     Plan made per [facility] anticoagulation protocol    Tami Hale RN  Anticoagulation Clinic  5/13/2022    _______________________________________________________________________     Anticoagulation Episode Summary     Current INR goal:  2.0-3.0   TTR:  70.4 % (1 y)   Target end date:  Indefinite   Send INR reminders to:  [facility]    Indications    Pulmonary emboli (H) [I26.99]  Long term current use of anticoagulant therapy [Z79.01]           Comments:  HIPPA Forms returned 11/13/19  OK to leave messages on Cell  [phone]         Anticoagulation Care Providers     Provider Role Specialty Phone number    Anjel Busby MD Referring Family Medicine [phone]

## 2022-05-24 ENCOUNTER — LAB (OUTPATIENT)
Dept: LAB | Facility: CLINIC | Age: 62
End: 2022-05-24
Payer: COMMERCIAL

## 2022-05-24 ENCOUNTER — OFFICE VISIT (OUTPATIENT)
Dept: FAMILY MEDICINE | Facility: CLINIC | Age: 62
End: 2022-05-24

## 2022-05-24 ENCOUNTER — ANCILLARY PROCEDURE (OUTPATIENT)
Dept: CT IMAGING | Facility: CLINIC | Age: 62
End: 2022-05-24
Attending: FAMILY MEDICINE
Payer: COMMERCIAL

## 2022-05-24 ENCOUNTER — ANTICOAGULATION THERAPY VISIT (OUTPATIENT)
Dept: ANTICOAGULATION | Facility: CLINIC | Age: 62
End: 2022-05-24

## 2022-05-24 VITALS
WEIGHT: 293 LBS | SYSTOLIC BLOOD PRESSURE: 100 MMHG | BODY MASS INDEX: 47.09 KG/M2 | OXYGEN SATURATION: 96 % | HEIGHT: 66 IN | DIASTOLIC BLOOD PRESSURE: 71 MMHG | HEART RATE: 85 BPM

## 2022-05-24 DIAGNOSIS — I26.99 PULMONARY EMBOLI (H): Primary | ICD-10-CM

## 2022-05-24 DIAGNOSIS — M19.90 INFLAMMATORY ARTHRITIS: ICD-10-CM

## 2022-05-24 DIAGNOSIS — R49.9 CHANGE IN VOICE: ICD-10-CM

## 2022-05-24 DIAGNOSIS — Z00.00 HEALTH CARE MAINTENANCE: ICD-10-CM

## 2022-05-24 DIAGNOSIS — Z00.00 HEALTH CARE MAINTENANCE: Primary | ICD-10-CM

## 2022-05-24 DIAGNOSIS — R53.83 FATIGUE, UNSPECIFIED TYPE: ICD-10-CM

## 2022-05-24 DIAGNOSIS — R05.3 CHRONIC COUGH: ICD-10-CM

## 2022-05-24 DIAGNOSIS — R06.09 DOE (DYSPNEA ON EXERTION): ICD-10-CM

## 2022-05-24 DIAGNOSIS — E78.5 HYPERLIPIDEMIA LDL GOAL <100: ICD-10-CM

## 2022-05-24 DIAGNOSIS — I26.99 PULMONARY EMBOLI (H): ICD-10-CM

## 2022-05-24 DIAGNOSIS — Z79.01 LONG TERM CURRENT USE OF ANTICOAGULANT THERAPY: ICD-10-CM

## 2022-05-24 LAB
ALBUMIN SERPL-MCNC: 3.5 G/DL (ref 3.4–5)
ALP SERPL-CCNC: 70 U/L (ref 40–150)
ALT SERPL W P-5'-P-CCNC: 15 U/L (ref 0–50)
ANION GAP SERPL CALCULATED.3IONS-SCNC: 5 MMOL/L (ref 3–14)
AST SERPL W P-5'-P-CCNC: 14 U/L (ref 0–45)
ATRIAL RATE - MUSE: 77 BPM
BASOPHILS # BLD AUTO: 0 10E3/UL (ref 0–0.2)
BASOPHILS NFR BLD AUTO: 0 %
BILIRUB SERPL-MCNC: 0.4 MG/DL (ref 0.2–1.3)
BUN SERPL-MCNC: 29 MG/DL (ref 7–30)
CALCIUM SERPL-MCNC: 9.7 MG/DL (ref 8.5–10.1)
CHLORIDE BLD-SCNC: 109 MMOL/L (ref 94–109)
CHOLEST SERPL-MCNC: 210 MG/DL
CO2 SERPL-SCNC: 26 MMOL/L (ref 20–32)
CREAT SERPL-MCNC: 1.76 MG/DL (ref 0.52–1.04)
DIASTOLIC BLOOD PRESSURE - MUSE: NORMAL MMHG
EOSINOPHIL # BLD AUTO: 0.2 10E3/UL (ref 0–0.7)
EOSINOPHIL NFR BLD AUTO: 2 %
ERYTHROCYTE [DISTWIDTH] IN BLOOD BY AUTOMATED COUNT: 13.5 % (ref 10–15)
FASTING STATUS PATIENT QL REPORTED: ABNORMAL
GFR SERPL CREATININE-BSD FRML MDRD: 32 ML/MIN/1.73M2
GLUCOSE BLD-MCNC: 105 MG/DL (ref 70–99)
HBA1C MFR BLD: 5.3 % (ref 0–5.6)
HCT VFR BLD AUTO: 46.7 % (ref 35–47)
HDLC SERPL-MCNC: 72 MG/DL
HGB BLD-MCNC: 14.9 G/DL (ref 11.7–15.7)
IMM GRANULOCYTES # BLD: 0.1 10E3/UL
IMM GRANULOCYTES NFR BLD: 1 %
INR BLD: 3.2 (ref 0.9–1.1)
INTERPRETATION ECG - MUSE: NORMAL
LDLC SERPL CALC-MCNC: 111 MG/DL
LYMPHOCYTES # BLD AUTO: 1.4 10E3/UL (ref 0.8–5.3)
LYMPHOCYTES NFR BLD AUTO: 15 %
MCH RBC QN AUTO: 30.5 PG (ref 26.5–33)
MCHC RBC AUTO-ENTMCNC: 31.9 G/DL (ref 31.5–36.5)
MCV RBC AUTO: 96 FL (ref 78–100)
MONOCYTES # BLD AUTO: 0.7 10E3/UL (ref 0–1.3)
MONOCYTES NFR BLD AUTO: 7 %
NEUTROPHILS # BLD AUTO: 7.6 10E3/UL (ref 1.6–8.3)
NEUTROPHILS NFR BLD AUTO: 75 %
NONHDLC SERPL-MCNC: 138 MG/DL
NRBC # BLD AUTO: 0 10E3/UL
NRBC BLD AUTO-RTO: 0 /100
P AXIS - MUSE: 60 DEGREES
PLATELET # BLD AUTO: 290 10E3/UL (ref 150–450)
POTASSIUM BLD-SCNC: 4.7 MMOL/L (ref 3.4–5.3)
PR INTERVAL - MUSE: 186 MS
PROT SERPL-MCNC: 8.2 G/DL (ref 6.8–8.8)
QRS DURATION - MUSE: 88 MS
QT - MUSE: 418 MS
QTC - MUSE: 473 MS
R AXIS - MUSE: 48 DEGREES
RBC # BLD AUTO: 4.89 10E6/UL (ref 3.8–5.2)
SODIUM SERPL-SCNC: 140 MMOL/L (ref 133–144)
SYSTOLIC BLOOD PRESSURE - MUSE: NORMAL MMHG
T AXIS - MUSE: 101 DEGREES
TRIGL SERPL-MCNC: 134 MG/DL
TSH SERPL DL<=0.005 MIU/L-ACNC: 2.23 MU/L (ref 0.4–4)
VENTRICULAR RATE- MUSE: 77 BPM
WBC # BLD AUTO: 10 10E3/UL (ref 4–11)

## 2022-05-24 PROCEDURE — 80050 GENERAL HEALTH PANEL: CPT | Performed by: PATHOLOGY

## 2022-05-24 PROCEDURE — 99396 PREV VISIT EST AGE 40-64: CPT | Mod: 25 | Performed by: FAMILY MEDICINE

## 2022-05-24 PROCEDURE — 36415 COLL VENOUS BLD VENIPUNCTURE: CPT | Performed by: PATHOLOGY

## 2022-05-24 PROCEDURE — 93000 ELECTROCARDIOGRAM COMPLETE: CPT | Performed by: FAMILY MEDICINE

## 2022-05-24 PROCEDURE — 83036 HEMOGLOBIN GLYCOSYLATED A1C: CPT | Performed by: PATHOLOGY

## 2022-05-24 PROCEDURE — 80061 LIPID PANEL: CPT | Performed by: PATHOLOGY

## 2022-05-24 PROCEDURE — 99213 OFFICE O/P EST LOW 20 MIN: CPT | Mod: 25 | Performed by: FAMILY MEDICINE

## 2022-05-24 PROCEDURE — 0054A COVID-19,PF,PFIZER (12+ YRS): CPT | Performed by: FAMILY MEDICINE

## 2022-05-24 PROCEDURE — 91305 COVID-19,PF,PFIZER (12+ YRS): CPT | Performed by: FAMILY MEDICINE

## 2022-05-24 PROCEDURE — 71250 CT THORAX DX C-: CPT | Mod: GC | Performed by: RADIOLOGY

## 2022-05-24 RX ORDER — CHOLECALCIFEROL (VITAMIN D3) 25 MCG
TABLET ORAL
COMMUNITY
Start: 2022-03-08

## 2022-05-24 ASSESSMENT — PAIN SCALES - GENERAL: PAINLEVEL: NO PAIN (0)

## 2022-05-24 NOTE — PATIENT INSTRUCTIONS
To schedule your echocardiogram (ECHO), please call [phone].     To schedule a CT Scan, please call radiology scheduling at [phone].

## 2022-05-24 NOTE — PROGRESS NOTES
"      Assessment & Plan     Health care maintenance  Due  - COVID-19,PF,PFIZER (12+ Yrs GRAY LABEL)  - Hemoglobin A1c; Future  - Ob/Gyn Referral    Inflammatory arthritis  Due for rheum  - Lipid panel reflex to direct LDL Fasting; Future  - Adult Rheumatology  Referral; Future    Fatigue, unspecified type  Consider second opinion outside CYRUS MD, she'll think about it. Continue w/ psychiatry at [facility].  - CBC with platelets differential; Future  - Comprehensive metabolic panel; Future  - TSH with free T4 reflex; Future    Chronic cough    - CT Chest w/o contrast; Future  - Adult Pulmonary Medicine Referral    Change in voice  Slightly hoarse today  - Adult ENT  Referral; Future    SANTOS (dyspnea on exertion)  Do today orders, discuss w/ pulm   - Echocardiogram Complete; Future  - EKG Performed in Clinic w/ Provider Reading Fee  EKG shows SR, borderlijne old anterior infarct changes, await echo results. No acute sx    Hyperlipidemia LDL goal <100  Due  - Lipid panel reflex to direct LDL Fasting; Future      70 minutes spent on the date of the encounter doing chart review, history and exam, documentation and further activities per the note, separate from EKG read time    BMI:   Estimated body mass index is 47.55 kg/m  as calculated from the following:    Height as of this encounter: 1.676 m (5' 6\").    Weight as of this encounter: 133.6 kg (294 lb 9.6 oz).     Anjel Busby MD  [facility] PEMA Silva is a 62 year old who presents for the following health issues     HPI   Here for physical and various complaints.  1-in past told severe CYRUS, is hard to tolerate cpap, recently told if sleeps w/ elevated HOB no significant CYRUS so does that. However, never has restorative sleep, is obese.  2-grief: about a year ago mom became ill, cva, then  a few mo ago, stress and grief significant, she does have a psychiatrist she works w/ at [facility]  3-chronic " cough, from even before PE, slowly worse and more productive.   4-fatigue; intense. Does ADL's, little else. Discussed if could be CYRUS vs mental health vs multiple causes of other organ systems or meds, or a mixture. She might have a degree of SANTOS although on exertion main symptom is quick overall body fatigue, even minor house chores or showering wear her out and needs to rest.  5-not on pred now, has been in past, consider dexa at least every two years given that, and female in her sixties, and relative inactivity, osteopenia noted last scan  6-rheumatologic disorder: per pt no pain on methotrexate, due for rheum f/u  7-due for mammogram this summer she knows  8-due for pap, ostensibly if wnl would not need another  9-colonoscopy due 2028  10-voice weaker slowly, not really hoarse. We discuss w/ that and cough could she have GERD, she does not note it. No h/o etoh (strong FH etoh abuse)  11-psychiatry has her on cymbalta and abilify  12-did smoke a ppd over 20 years in regards to voice change, cough    Past Medical History:   Diagnosis Date     Chronic bronchitis (H) 07/30/2015     Contact dermatitis      Depressive disorder 1985     Eczema     HANDS     Elevated liver enzymes      H/O total knee replacement, left      History of total hip replacement 10/27/2011     Hyperlipidemia      Hypertension      Morbid obesity (H)      Osteoarthrosis     right knee     Sleep apnea      Tobacco use disorder      Past Surgical History:   Procedure Laterality Date     ARTHROPLASTY KNEE  6/14/2012    Procedure: ARTHROPLASTY KNEE;  Left Total Knee Arthroplasty;  Surgeon: Annette Quesada MD;  [facility]: [facility]     ARTHROSCOPY HIP Right      BRONCHOSCOPY FLEXIBLE AND RIGID N/A 9/17/2019    Procedure: Bronchoscopy flexible;  Surgeon: Patrick Rayo MD;  Location:  OR     COLONOSCOPY N/A 7/16/2021    Procedure: COLONOSCOPY, WITH POLYPECTOMY AND BIOPSY;  Surgeon: Diogo Lynch MD;  Location:  GI     CV  "EXTRACORPERAL MEMBRANE OXYGENATION N/A 9/9/2019    Procedure: Extracorporeal Membrance Oxygenation;  Surgeon: Kaleb Mcfarlane MD;  Location:  HEART CARDIAC CATH LAB     INSERT EXTRACORPORAL MEMBRANE OXYGENATOR N/A 9/17/2019    Procedure: Venous-Venous cannulation using ultrasound guidance and transesophageal echocardiogram, venous-arterial ecmo decannulation and repair of left femoral artery;  Surgeon: Patrick Rayo MD;  Location: [facility] OR     IR MECH THROMB PRIM ART, NON-INTRACRNL  9/10/2019     IR PULMONARY ANGIOGRAM BILATERAL  9/10/2019     IR THROMBOLYSIS ARTERIAL INFUSION INITIAL DAY  9/10/2019     THORACENTESIS N/A 1/16/2020    Procedure: THORACENTESIS;  Surgeon: Georgina Richardson MD;  Location: [facility] TOTAL HIP ARTHROPLASTY  07/09/04    RT     Current Outpatient Medications   Medication     acetaminophen (TYLENOL) 325 MG tablet     ARIPiprazole (ABILIFY) 10 MG tablet     DULoxetine (CYMBALTA) 60 MG capsule     folic acid (FOLVITE) 1 MG tablet     insulin syringe-needle U-100 (BD INSULIN SYRINGE ULTRAFINE) 30G X 1/2\" 1 ML miscellaneous     lisinopril (ZESTRIL) 5 MG tablet     methotrexate 50 MG/2ML injection     VITAMIN D3 25 MCG (1000 UT) tablet     warfarin ANTICOAGULANT (COUMADIN) 5 MG tablet     atorvastatin (LIPITOR) 40 MG tablet     nystatin (MYCOSTATIN) 508324 UNIT/GM external powder     order for DME     No current facility-administered medications for this visit.     Facility-Administered Medications Ordered in Other Visits   Medication     sodium chloride (PF) 0.9% PF flush 10 mL     Allergies   Allergen Reactions     Adhesive Tape Blisters     Erythromycin Rash       Family History   Problem Relation Age of Onset     Thyroid Disease Mother      Hypertension Mother      Skin Cancer Mother         MMohs surgery with skin graft     Cerebrovascular Disease Mother         CVA after endarderectomy     Diabetes Mother      Breast Cancer Paternal Grandmother      Pancreatic Cancer " "Paternal Grandmother      Cardiovascular Paternal Aunt      Cardiovascular Paternal Uncle      Depression Other      Alcoholism Father      Heart Disease Father      Thyroid Disease Sister      Alcoholism Sister      Hypertension Maternal Grandmother      Heart Disease Sister         multiple ablations     Alcoholism Sister      Prostate Cancer Paternal Grandfather      Social History     Socioeconomic History     Marital status: Single     Spouse name: Not on file     Number of children: Not on file     Years of education: Not on file     Highest education level: Not on file   Occupational History     Occupation: registered nurse     Comment: [employer]   Tobacco Use     Smoking status: Former Smoker     Packs/day: 1.00     Years: 33.00     Pack years: 33.00     Types: Cigarettes     Start date: 1982     Quit date: 2019     Years since quittin.7     Smokeless tobacco: Never Used   Substance and Sexual Activity     Alcohol use: Yes     Alcohol/week: 0.0 standard drinks     Comment: occassional     Drug use: No     Sexual activity: Never   Other Topics Concern     Parent/sibling w/ CABG, MI or angioplasty before 65F 55M? Not Asked   Social History Narrative     Not on file     Social Determinants of Health     Financial Resource Strain: Not on file   Food Insecurity: Not on file   Transportation Needs: Not on file   Physical Activity: Not on file   Stress: Not on file   Social Connections: Not on file   Intimate Partner Violence: Not on file   Housing Stability: Not on file       Review of Systems         Objective    /71 (BP Location: Other (Comment), Patient Position: Sitting, Cuff Size: Adult Large)   Pulse 85   Ht 1.676 m (5' 6\")   Wt 133.6 kg (294 lb 9.6 oz)   SpO2 96%   BMI 47.55 kg/m    Body mass index is 47.55 kg/m .  Physical Exam   GENERAL: healthy, alert and no distress  EYES: Eyes grossly normal to inspection, PERRL and conjunctivae and sclerae normal  HENT: ear canals and TM's " normal, nose and mouth without ulcers or lesions  NECK: no adenopathy, no asymmetry, masses, or scars and thyroid normal to palpation  RESP: lungs clear to auscultation - no rales, rhonchi or wheezes  CV: regular rate and rhythm, normal S1 S2, no S3 or S4, no murmur, click or rub, no peripheral edema and peripheral pulses strong  ABDOMEN: soft, nontender, no hepatosplenomegaly, no masses and bowel sounds normal  MS: no gross musculoskeletal defects noted, no edema  SKIN: no suspicious lesions or rashes  NEURO: Normal strength and tone, mentation intact and speech normal  PSYCH: mentation appears normal, affect normal/bright

## 2022-05-26 ENCOUNTER — TELEPHONE (OUTPATIENT)
Dept: PULMONOLOGY | Facility: CLINIC | Age: 62
End: 2022-05-26
Payer: COMMERCIAL

## 2022-05-26 NOTE — TELEPHONE ENCOUNTER
Spoke with pt regarding her new appt with Dr. Butler end of August. However, pt was last seen by Dr. Richardson in 9/2020 which is within 3 year protocol so pt is not a new pt and should be a return. Spoke with pt and informed her that I would be able to get her in sooner than Aug if pt is agreeable to seeing someone else. Pt is agreeable to this. FPFT and appt with Dr. Butler cancelled and appt scheduled with Dr. Pizano end of June. Informed her as there aren't orders so will have Dr. Pizano see her first and determine if orders are needed.

## 2022-06-01 ENCOUNTER — MYC MEDICAL ADVICE (OUTPATIENT)
Dept: FAMILY MEDICINE | Facility: CLINIC | Age: 62
End: 2022-06-01
Payer: COMMERCIAL

## 2022-06-01 DIAGNOSIS — M62.81 GENERALIZED MUSCLE WEAKNESS: Primary | ICD-10-CM

## 2022-06-02 ENCOUNTER — HOSPITAL ENCOUNTER (OUTPATIENT)
Dept: CARDIOLOGY | Facility: CLINIC | Age: 62
Discharge: HOME | End: 2022-06-02
Attending: FAMILY MEDICINE | Admitting: FAMILY MEDICINE
Payer: COMMERCIAL

## 2022-06-02 DIAGNOSIS — R06.09 DOE (DYSPNEA ON EXERTION): ICD-10-CM

## 2022-06-02 LAB — LVEF ECHO: NORMAL

## 2022-06-02 PROCEDURE — 93306 TTE W/DOPPLER COMPLETE: CPT | Mod: 26 | Performed by: INTERNAL MEDICINE

## 2022-06-02 PROCEDURE — 999N000208 ECHOCARDIOGRAM COMPLETE

## 2022-06-02 PROCEDURE — 255N000002 HC RX 255 OP 636: Performed by: INTERNAL MEDICINE

## 2022-06-02 RX ADMIN — HUMAN ALBUMIN MICROSPHERES AND PERFLUTREN 6 ML: 10; .22 INJECTION, SOLUTION INTRAVENOUS at 09:56

## 2022-06-13 ENCOUNTER — LAB (OUTPATIENT)
Dept: LAB | Facility: CLINIC | Age: 62
End: 2022-06-13
Payer: COMMERCIAL

## 2022-06-13 ENCOUNTER — ANTICOAGULATION THERAPY VISIT (OUTPATIENT)
Dept: ANTICOAGULATION | Facility: CLINIC | Age: 62
End: 2022-06-13

## 2022-06-13 DIAGNOSIS — I26.99 PULMONARY EMBOLI (H): ICD-10-CM

## 2022-06-13 DIAGNOSIS — Z79.01 LONG TERM CURRENT USE OF ANTICOAGULANT THERAPY: ICD-10-CM

## 2022-06-13 DIAGNOSIS — I26.99 PULMONARY EMBOLI (H): Primary | ICD-10-CM

## 2022-06-13 LAB — INR BLD: 2.4 (ref 0.9–1.1)

## 2022-06-13 PROCEDURE — 85610 PROTHROMBIN TIME: CPT

## 2022-06-13 PROCEDURE — 36416 COLLJ CAPILLARY BLOOD SPEC: CPT

## 2022-06-13 NOTE — PROGRESS NOTES
ANTICOAGULATION MANAGEMENT     Shira Rodriguez 62 year old female is on warfarin with therapeutic INR result. (Goal INR 2.0-3.0)    Recent labs: (last 7 days)     06/13/22  1314   INR 2.4*       ASSESSMENT       Source(s): Chart Review and Patient/Caregiver Call       Warfarin doses taken: Warfarin taken as instructed    Diet: No new diet changes identified    New illness, injury, or hospitalization: No    Medication/supplement changes: None noted    Signs or symptoms of bleeding or clotting: No    Previous INR: Supratherapeutic    Additional findings: None       PLAN     Recommended plan for no diet, medication or health factor changes affecting INR     Dosing Instructions: continue your current warfarin dose with next INR in 4 weeks       Summary  As of 6/13/2022    Full warfarin instructions:  2.5 mg every Wed; 5 mg all other days   Next INR check:  7/11/2022             Telephone call with Shira who verbalizes understanding and agrees to plan and who agrees to plan and repeated back plan correctly    Lab visit scheduled    Education provided: Goal range and significance of current result, Importance of therapeutic range and Importance of following up at instructed interval    Plan made per [facility] anticoagulation protocol    Mariah Sellers RN  Anticoagulation Clinic  6/13/2022    _______________________________________________________________________     Anticoagulation Episode Summary     Current INR goal:  2.0-3.0   TTR:  66.0 % (1 y)   Target end date:  Indefinite   Send INR reminders to:  [facility]    Indications    Pulmonary emboli (H) [I26.99]  Long term current use of anticoagulant therapy [Z79.01]           Comments:  HIPPA Forms returned 11/13/19  OK to leave messages on Cell  [phone]         Anticoagulation Care Providers     Provider Role Specialty Phone number    Anjel Busby MD Referring Family Medicine [phone]

## 2022-06-16 ASSESSMENT — ENCOUNTER SYMPTOMS
NIGHT SWEATS: 0
FEVER: 0
POSTURAL DYSPNEA: 1
DECREASED CONCENTRATION: 1
CONSTIPATION: 1
HEARTBURN: 0
ALTERED TEMPERATURE REGULATION: 0
INCREASED ENERGY: 0
BLOOD IN STOOL: 0
DECREASED CONCENTRATION: 1
NECK PAIN: 0
FEVER: 0
MUSCLE WEAKNESS: 0
BOWEL INCONTINENCE: 0
POSTURAL DYSPNEA: 1
COUGH DISTURBING SLEEP: 1
POLYPHAGIA: 0
NIGHT SWEATS: 0
POLYPHAGIA: 0
JAUNDICE: 0
HALLUCINATIONS: 0
NERVOUS/ANXIOUS: 1
COUGH: 1
WHEEZING: 0
FATIGUE: 1
ARTHRALGIAS: 1
INCREASED ENERGY: 0
SNORES LOUDLY: 1
MUSCLE CRAMPS: 0
NAUSEA: 0
WEIGHT LOSS: 0
COUGH DISTURBING SLEEP: 1
BLOATING: 0
RECTAL PAIN: 0
HALLUCINATIONS: 0
BACK PAIN: 1
POLYDIPSIA: 0
POLYDIPSIA: 0
WHEEZING: 0
SHORTNESS OF BREATH: 1
CHILLS: 0
JAUNDICE: 0
DEPRESSION: 1
BLOOD IN STOOL: 0
WEIGHT LOSS: 0
COUGH: 1
PANIC: 0
MUSCLE CRAMPS: 0
ABDOMINAL PAIN: 0
BACK PAIN: 1
MYALGIAS: 1
HEARTBURN: 0
SPUTUM PRODUCTION: 1
ARTHRALGIAS: 1
DIARRHEA: 0
SHORTNESS OF BREATH: 1
INSOMNIA: 1
SNORES LOUDLY: 1
CHILLS: 0
HEMOPTYSIS: 0
JOINT SWELLING: 0
ABDOMINAL PAIN: 0
BLOATING: 0
VOMITING: 0
PANIC: 0
SWOLLEN GLANDS: 0
JOINT SWELLING: 0
BRUISES/BLEEDS EASILY: 1
ALTERED TEMPERATURE REGULATION: 0
DYSPNEA ON EXERTION: 1
NERVOUS/ANXIOUS: 1
MYALGIAS: 1
STIFFNESS: 1
BRUISES/BLEEDS EASILY: 1
INSOMNIA: 1
STIFFNESS: 1
RECTAL PAIN: 0
MUSCLE WEAKNESS: 0
DEPRESSION: 1
FATIGUE: 1
SPUTUM PRODUCTION: 1
NECK PAIN: 0
NAUSEA: 0
CONSTIPATION: 1
HEMOPTYSIS: 0
BOWEL INCONTINENCE: 0
VOMITING: 0
WEIGHT GAIN: 0
SWOLLEN GLANDS: 0
DIARRHEA: 0
DYSPNEA ON EXERTION: 1
WEIGHT GAIN: 0

## 2022-06-16 ASSESSMENT — ANXIETY QUESTIONNAIRES
5. BEING SO RESTLESS THAT IT IS HARD TO SIT STILL: NOT AT ALL
GAD7 TOTAL SCORE: 5
1. FEELING NERVOUS, ANXIOUS, OR ON EDGE: MORE THAN HALF THE DAYS
GAD7 TOTAL SCORE: 5
3. WORRYING TOO MUCH ABOUT DIFFERENT THINGS: SEVERAL DAYS
7. FEELING AFRAID AS IF SOMETHING AWFUL MIGHT HAPPEN: NOT AT ALL
6. BECOMING EASILY ANNOYED OR IRRITABLE: NOT AT ALL
2. NOT BEING ABLE TO STOP OR CONTROL WORRYING: SEVERAL DAYS
GAD7 TOTAL SCORE: 5
4. TROUBLE RELAXING: SEVERAL DAYS
7. FEELING AFRAID AS IF SOMETHING AWFUL MIGHT HAPPEN: NOT AT ALL
8. IF YOU CHECKED OFF ANY PROBLEMS, HOW DIFFICULT HAVE THESE MADE IT FOR YOU TO DO YOUR WORK, TAKE CARE OF THINGS AT HOME, OR GET ALONG WITH OTHER PEOPLE?: SOMEWHAT DIFFICULT

## 2022-06-20 ENCOUNTER — OFFICE VISIT (OUTPATIENT)
Dept: OBGYN | Facility: CLINIC | Age: 62
End: 2022-06-20
Attending: FAMILY MEDICINE
Payer: COMMERCIAL

## 2022-06-20 ENCOUNTER — ANCILLARY PROCEDURE (OUTPATIENT)
Dept: MAMMOGRAPHY | Facility: CLINIC | Age: 62
End: 2022-06-20
Attending: ADVANCED PRACTICE MIDWIFE
Payer: COMMERCIAL

## 2022-06-20 VITALS
BODY MASS INDEX: 46.88 KG/M2 | HEIGHT: 66 IN | WEIGHT: 291.7 LBS | HEART RATE: 103 BPM | DIASTOLIC BLOOD PRESSURE: 88 MMHG | SYSTOLIC BLOOD PRESSURE: 126 MMHG

## 2022-06-20 DIAGNOSIS — Z12.31 VISIT FOR SCREENING MAMMOGRAM: ICD-10-CM

## 2022-06-20 DIAGNOSIS — Z01.419 ENCOUNTER FOR GYNECOLOGICAL EXAMINATION WITHOUT ABNORMAL FINDING: Primary | ICD-10-CM

## 2022-06-20 DIAGNOSIS — Z12.4 SCREENING FOR MALIGNANT NEOPLASM OF CERVIX: ICD-10-CM

## 2022-06-20 PROCEDURE — G0101 CA SCREEN;PELVIC/BREAST EXAM: HCPCS | Performed by: ADVANCED PRACTICE MIDWIFE

## 2022-06-20 PROCEDURE — 77067 SCR MAMMO BI INCL CAD: CPT | Mod: 26 | Performed by: STUDENT IN AN ORGANIZED HEALTH CARE EDUCATION/TRAINING PROGRAM

## 2022-06-20 PROCEDURE — G0463 HOSPITAL OUTPT CLINIC VISIT: HCPCS

## 2022-06-20 PROCEDURE — G0145 SCR C/V CYTO,THINLAYER,RESCR: HCPCS | Performed by: ADVANCED PRACTICE MIDWIFE

## 2022-06-20 PROCEDURE — 77067 SCR MAMMO BI INCL CAD: CPT

## 2022-06-20 PROCEDURE — 87624 HPV HI-RISK TYP POOLED RSLT: CPT | Performed by: ADVANCED PRACTICE MIDWIFE

## 2022-06-20 ASSESSMENT — PATIENT HEALTH QUESTIONNAIRE - PHQ9
SUM OF ALL RESPONSES TO PHQ QUESTIONS 1-9: 6
5. POOR APPETITE OR OVEREATING: NOT AT ALL

## 2022-06-20 ASSESSMENT — ANXIETY QUESTIONNAIRES
2. NOT BEING ABLE TO STOP OR CONTROL WORRYING: MORE THAN HALF THE DAYS
6. BECOMING EASILY ANNOYED OR IRRITABLE: SEVERAL DAYS
5. BEING SO RESTLESS THAT IT IS HARD TO SIT STILL: NOT AT ALL
3. WORRYING TOO MUCH ABOUT DIFFERENT THINGS: NOT AT ALL
GAD7 TOTAL SCORE: 6
7. FEELING AFRAID AS IF SOMETHING AWFUL MIGHT HAPPEN: SEVERAL DAYS
1. FEELING NERVOUS, ANXIOUS, OR ON EDGE: MORE THAN HALF THE DAYS

## 2022-06-20 NOTE — PROGRESS NOTES
"  Progress Note    SUBJECTIVE:  Shira Rodriguez is an 62 year old  No obstetric history on file. Requests a breast and pelvic exam. Mammo done prior to visit  Patient is followed by MD Qi for primary care.    Concerns today include: none, due for pap    Menstrual History:  States LMP was \"sometime in her 40s\".  Not sexually active, denies struggles w any menopause symptoms    Last    Lab Results   Component Value Date    PAP NIL 09/24/2016     History of abnormal Pap smear: NO - age 66 - see link Cervical Cytology Screening Guidelines    Last   Lab Results   Component Value Date    HPV16 Negative 09/24/2016     Last   Lab Results   Component Value Date    HPV18 Negative 09/24/2016     Last   Lab Results   Component Value Date    HRHPV Negative 09/24/2016       Mammogram current: yes, completed 6/20    HISTORY:  Prescription Medications as of 6/20/2022       Rx Number Disp Refills Start End Last Dispensed Date Next Fill Date Owning Pharmacy    acetaminophen (TYLENOL) 325 MG tablet    10/11/2019        Sig: Take 2 tablets (650 mg) by mouth every 6 hours as needed for mild pain or fever    Class: OTC    Route: Oral    ARIPiprazole (ABILIFY) 10 MG tablet  90 tablet 1 6/14/2021    [id number]  [place] [facility] [place] [facility] [address]  AT [address]    Sig: Take 1 tablet (10 mg) by mouth daily    Class: E-Prescribe    Route: Oral    atorvastatin (LIPITOR) 40 MG tablet  90 tablet 3 12/23/2020    [id number] [place] [facility] [address] [facility]    Sig: Take 1 tablet (40 mg) by mouth daily    Class: E-Prescribe    Route: Oral    DULoxetine (CYMBALTA) 60 MG capsule  30 capsule 0 10/12/2019    [id number]  [place] [facility] [place] [facility] [address] [facility]    Sig: Take 1 capsule (60 mg) by mouth daily    Class: E-Prescribe    Route: Oral    folic acid (FOLVITE) 1 MG tablet  120 tablet 7 5/18/2021    [id number] - [place] - [address]  AT [facility] " "OF [address]    Sig: Take 3-4 tablets (3-4 mg) by mouth daily    Class: E-Prescribe    Route: Oral    insulin syringe-needle U-100 (BD INSULIN SYRINGE ULTRAFINE) 30G X 1/2\" 1 ML miscellaneous  12 each 3 5/18/2021    [id number] - [place], [place] - [address] AT [address]    Sig: For Methotrexate use weekly.    Class: E-Prescribe    lisinopril (ZESTRIL) 5 MG tablet  90 tablet 3 8/8/2021    [facility] #[id number]  [place] - [address] AT [address]    Sig: Take 1 tablet (5 mg) by mouth daily    Class: E-Prescribe    Route: Oral    methotrexate 50 MG/2ML injection  12 mL 5 5/18/2021    [facility] #[id number]  [place]  [address] AT [address]    Sig: Inject 1 mL (25 mg) Subcutaneous every 7 days    Class: E-Prescribe    Route: Subcutaneous    VITAMIN D3 25 MCG (1000 UT) tablet    3/8/2022        Sig: TAKE 2 TABLETS BY MOUTH EVERY DAY    Class: Historical    warfarin ANTICOAGULANT (COUMADIN) 5 MG tablet  135 tablet 1 8/4/2021    [id number] - [place] - [address] AT [address]    Sig: Take 1-1.5 tablets daily or as directed.    Class: E-Prescribe      Clinic-Administered Medications as of 6/20/2022       Dose Frequency Start End    sodium chloride (PF) 0.9% PF flush 10 mL 10 mL ONCE 12/23/2019     Route: Intracatheter        Allergies   Allergen Reactions     Adhesive Tape Blisters     Erythromycin Rash     Immunization History   Administered Date(s) Administered     COVID-19,PF,Pfizer (12+ Yrs) 03/12/2021, 04/02/2021, 12/30/2021     COVID-19,PF,Pfizer 12+ Yrs (2022 and After) 05/24/2022     Influenza (IIV3) PF 10/04/2011, 10/03/2012, 09/24/2016, 10/16/2017     Influenza Quad, Recombinant, pf(RIV4) (Flublok) 09/29/2019     Influenza Vaccine IM > 6 months Valent IIV4 (ManuelitoFluzone) 10/03/2018     Pneumo " Conj 13-V (2010&after) 08/28/2017, 05/23/2019     Pneumococcal 23 valent 12/04/2017     TDAP Vaccine (Boostrix) 08/21/2015     Tdap (Adacel,Boostrix) 07/16/2010     Zoster vaccine recombinant adjuvanted (SHINGRIX) 06/04/2018, 05/23/2019       OB History   No obstetric history on file.     Past Medical History:   Diagnosis Date     Chronic bronchitis (H) 07/30/2015     Contact dermatitis      Depressive disorder 1985     Eczema     HANDS     Elevated liver enzymes      H/O total knee replacement, left      History of total hip replacement 10/27/2011     Hyperlipidemia      Hypertension      Morbid obesity (H)      Osteoarthrosis     right knee     RA (rheumatoid arthritis) (H) 8/2018     Sleep apnea      Tobacco use disorder      Varicella 1965     Past Surgical History:   Procedure Laterality Date     ARTHROPLASTY KNEE  6/14/2012    Procedure: ARTHROPLASTY KNEE;  Left Total Knee Arthroplasty;  Surgeon: Annette Quesada MD;  [facility]     ARTHROSCOPY HIP Right      BRONCHOSCOPY FLEXIBLE AND RIGID N/A 9/17/2019    Procedure: Bronchoscopy flexible;  Surgeon: Patrick Rayo MD;  [facility] OR     COLONOSCOPY N/A 7/16/2021    Procedure: COLONOSCOPY, WITH POLYPECTOMY AND BIOPSY;  Surgeon: Diogo Lynch MD;  [facility] GI     CV EXTRACORPERAL MEMBRANE OXYGENATION N/A 9/9/2019    Procedure: Extracorporeal Membrance Oxygenation;  Surgeon: Kaleb Mcfarlane MD;  [facility] HEART [facility]ARD[facility]AC [facility]     INSERT EXTRACORPORAL MEMBRANE OXYGENATOR N/A 9/17/2019    Procedure: Venous-Venous cannulation using ultrasound guidance and transesophageal echocardiogram, venous-arterial ecmo decannulation and repair of left femoral artery;  Surgeon: Patrick Rayo MD;  [facility] OR     IR [facility] THROMB PRIM ART, NON-INTRACRNL  9/10/2019     IR PULMONARY ANGIOGRAM BILATERAL  9/10/2019     IR THROMBOLYSIS ARTERIAL INFUSION INITIAL DAY  9/10/2019     THORACENTESIS N/A 1/16/2020    Procedure:  "THORACENTESIS;  Surgeon: Georgina Richardson MD;  Location: [facility] TOTAL HIP ARTHROPLASTY  04    RT     Family History   Problem Relation Age of Onset     Thyroid Disease Mother      Hypertension Mother      Skin Cancer Mother         MMohs surgery with skin graft     Cerebrovascular Disease Mother         CVA after endarderectomy-      Diabetes Mother      Depression Mother      Alcoholism Father      Heart Disease Father      Substance Abuse Father              Heart Disease Sister         multiple ablations     Alcoholism Sister      Substance Abuse Sister         Sober     Depression Sister      Substance Abuse Sister         Sober 30 plus years     Depression Sister      Thyroid Disease Sister      Hypertension Maternal Grandmother      Depression Maternal Grandmother      Breast Cancer Paternal Grandmother      Pancreatic Cancer Paternal Grandmother      Prostate Cancer Paternal Grandfather      Cardiovascular Paternal Aunt      Cardiovascular Paternal Uncle      Depression Cousin      Depression Other      Social History     Socioeconomic History     Marital status: Single   Occupational History     Occupation: registered nurse     Comment: [employer]   Tobacco Use     Smoking status: Former Smoker     Packs/day: 1.00     Years: 33.00     Pack years: 33.00     Types: Cigarettes     Start date: 1982     Quit date: 2019     Years since quittin.7     Smokeless tobacco: Never Used   Substance and Sexual Activity     Alcohol use: Yes     Alcohol/week: 0.0 standard drinks     Comment: occassional     Drug use: No     Sexual activity: Never     KATARZYNA-7 SCORE 2019   Total Score - 5 (mild anxiety) -   Total Score 3 5 6     PHQ 2020 9/15/2020 2022   PHQ-9 Total Score 12 17 6   Q9: Thoughts of better off dead/self-harm past 2 weeks Not at all Several days Not at all       ROS    EXAM:  /88   Pulse 103   Ht 1.676 m (5' 6\")   Wt " 132.3 kg (291 lb 11.2 oz)   BMI 47.08 kg/m      General appearance: Pleasant female in no acute distress.     BREAST EXAM:  Breast: Without visible skin changes. No dimpling or lesions seen.   Breasts supple, non-tender with palpation, no dominant mass, nodularity, or nipple discharge noted bilaterally. Axillary nodes negative.      PELVIC EXAM:  EG/BUS: Normal genital architecture without lesions, erythema or abnormal secretions Bartholin's, Urethra, Mackey's normal   Urethral meatus: normal  Urethra: no masses, tenderness, or scarring  Bladder: no masses or tenderness  Vagina: moist, pale pink, rugae and atrophic, thin, dry with creamy, white and odorless  Secretions. Narrowing introitus, admits mod kamini speculum and one digit  Cervix: no lesions  Uterus: anteverted,  and small, smooth, firm, mobile w/o pain  Adnexa: Within normal limits and No masses, nodularity, tenderness  Rectum:anus normal       ASSESSMENT:  Encounter Diagnoses   Name Primary?     Screening for malignant neoplasm of cervix      Encounter for gynecological examination without abnormal finding Yes      62 year old Female Pelvic and Breast Exam    PLAN:   Orders Placed This Encounter   Procedures     Pelvic and Breast Exam Procedure []     Pap Smear Exam []     - Mammogram completed today, 6/20/22, breast self awareness taught  -Continue primary care and mental health care with established provider  -If pap/HPV, nml/neg, pt will not need pap at age 67  PRN for preventive care or problems/concerns.  -Verbalized understanding and agreement with visit plan.    I, Estella MCKEON, RN, CARINP Student, am serving as a scribe; to document services personally performed by  Hyun Mcneal CNM, APRN based on data collection and the provider's statements to me.     Estella MCKEON, RN, WHNP Student  I agree with the PFSH and ROS as completed by the student, except for changes made by me. The remainder of the encounter was performed by me and  scribed by the student. The scribed note accurately reflects my personal services and decisions made by me.  Hyun Mcneal, DNP, CNM, APRN

## 2022-06-20 NOTE — LETTER
"6/20/2022       RE: Shira Rodriguez  [address]  [place] [address]     Dear Colleague,    Thank you for referring your patient, Shira Rodriguez, to the [facility] [place] at [facility]. Please see a copy of my visit note below.      Progress Note    SUBJECTIVE:  Shira Rodriguez is an 62 year old  No obstetric history on file. Requests a breast and pelvic exam. Mammo done prior to visit  Patient is followed by MD Qi for primary care.    Concerns today include: none, due for pap    Menstrual History:  States LMP was \"sometime in her 40s\".  Not sexually active, denies struggles w any menopause symptoms    Last    Lab Results   Component Value Date    PAP NIL 09/24/2016     History of abnormal Pap smear: NO - age 66 - see link Cervical Cytology Screening Guidelines    Last   Lab Results   Component Value Date    HPV16 Negative 09/24/2016     Last   Lab Results   Component Value Date    HPV18 Negative 09/24/2016     Last   Lab Results   Component Value Date    HRHPV Negative 09/24/2016       Mammogram current: yes, completed 6/20    HISTORY:  Prescription Medications as of 6/20/2022       Rx Number Disp Refills Start End Last Dispensed Date Next Fill Date Owning Pharmacy    acetaminophen (TYLENOL) 325 MG tablet    10/11/2019        Sig: Take 2 tablets (650 mg) by mouth every 6 hours as needed for mild pain or fever    Class: OTC    Route: Oral    ARIPiprazole (ABILIFY) 10 MG tablet  90 tablet 1 6/14/2021    [id number] [place] - [address] AT [address]    Sig: Take 1 tablet (10 mg) by mouth daily    Class: E-Prescribe    Route: Oral    atorvastatin (LIPITOR) 40 MG tablet  90 tablet 3 12/23/2020    [facility] #[id number] - [place], [place] - [address]    Sig: Take 1 tablet (40 mg) by mouth daily    Class: E-Prescribe    Route: Oral    DULoxetine " "(CYMBALTA) 60 MG capsule  30 capsule 0 10/12/2019    [facility]/pharmacy #[id number] - [place], [place] - [address]    Sig: Take 1 capsule (60 mg) by mouth daily    Class: E-Prescribe    Route: Oral    folic acid (FOLVITE) 1 MG tablet  120 tablet 7 5/18/2021    [id number]  [place] - [address] AT [address]    Sig: Take 3-4 tablets (3-4 mg) by mouth daily    Class: E-Prescribe    Route: Oral    insulin syringe-needle U-100 (BD INSULIN SYRINGE ULTRAFINE) 30G X 1/2\" 1 ML miscellaneous  12 each 3 5/18/2021    [id number] [place] [address] AT [address]    Sig: For Methotrexate use weekly.    Class: E-Prescribe    lisinopril (ZESTRIL) 5 MG tablet  90 tablet 3 8/8/2021    [id number]  [place] [address] AT [address]    Sig: Take 1 tablet (5 mg) by mouth daily    Class: E-Prescribe    Route: Oral    methotrexate 50 MG/2ML injection  12 mL 5 5/18/2021    [id number] [place] [address] AT [address]    Sig: Inject 1 mL (25 mg) Subcutaneous every 7 days    Class: E-Prescribe    Route: Subcutaneous    VITAMIN D3 25 MCG (1000 UT) tablet    3/8/2022        Sig: TAKE 2 TABLETS BY MOUTH EVERY DAY    Class: Historical    warfarin ANTICOAGULANT (COUMADIN) 5 MG tablet  135 tablet 1 8/4/2021    [id number] [place] [address] AT [address]    Sig: Take 1-1.5 tablets daily or as directed.    Class: E-Prescribe      Clinic-Administered Medications as of 6/20/2022       Dose Frequency Start End    sodium chloride (PF) 0.9% PF flush 10 mL 10 mL ONCE 12/23/2019     Route: Intracatheter        Allergies   Allergen Reactions     Adhesive Tape Blisters     Erythromycin Rash     Immunization History   Administered Date(s) Administered     " COVID-19,PF,Pfizer (12+ Yrs) 03/12/2021, 04/02/2021, 12/30/2021     COVID-19,PF,Pfizer 12+ Yrs (2022 and After) 05/24/2022     Influenza (IIV3) PF 10/04/2011, 10/03/2012, 09/24/2016, 10/16/2017     Influenza Quad, Recombinant, pf(RIV4) (Flublok) 09/29/2019     Influenza Vaccine IM > 6 months Valent IIV4 (Alfuria,Fluzone) 10/03/2018     Pneumo Conj 13-V (2010&after) 08/28/2017, 05/23/2019     Pneumococcal 23 valent 12/04/2017     TDAP Vaccine (Boostrix) 08/21/2015     Tdap (Adacel,Boostrix) 07/16/2010     Zoster vaccine recombinant adjuvanted (SHINGRIX) 06/04/2018, 05/23/2019       OB History   No obstetric history on file.     Past Medical History:   Diagnosis Date     Chronic bronchitis (H) 07/30/2015     Contact dermatitis      Depressive disorder 1985     Eczema     HANDS     Elevated liver enzymes      H/O total knee replacement, left      History of total hip replacement 10/27/2011     Hyperlipidemia      Hypertension      Morbid obesity (H)      Osteoarthrosis     right knee     RA (rheumatoid arthritis) (H) 8/2018     Sleep apnea      Tobacco use disorder      Varicella 1965     Past Surgical History:   Procedure Laterality Date     ARTHROPLASTY KNEE  6/14/2012    Procedure: ARTHROPLASTY KNEE;  Left Total Knee Arthroplasty;  Surgeon: Annette Quesada MD;  Location: [facility] OR     ARTHROSCOPY HIP Right      BRONCHOSCOPY FLEXIBLE AND RIGID N/A 9/17/2019    Procedure: Bronchoscopy flexible;  Surgeon: Patrick Rayo MD;  Location:  OR     COLONOSCOPY N/A 7/16/2021    Procedure: COLONOSCOPY, WITH POLYPECTOMY AND BIOPSY;  Surgeon: Diogo Lynch MD;  Location:  GI     CV EXTRACORPERAL MEMBRANE OXYGENATION N/A 9/9/2019    Procedure: Extracorporeal Membrance Oxygenation;  Surgeon: Kaleb Mcfarlane MD;  Location:  HEART CARDI[facility] CATH LAB     INSERT EXTRACORPORAL MEMBRANE OXYGENATOR N/A 9/17/2019    Procedure: Venous-Venous cannulation using ultrasound guidance and transesophageal echocardiogram,  venous-arterial ecmo decannulation and repair of left femoral artery;  Surgeon: Patrick Rayo MD;  Location: [facility] OR     IR MECH THROMB PRIM ART, NON-INTRACRNL  9/10/2019     IR PULMONARY ANGIOGRAM BILATERAL  9/10/2019     IR THROMBOLYSIS ARTERIAL INFUSION INITIAL DAY  9/10/2019     THORACENTESIS N/A 2020    Procedure: THORACENTESIS;  Surgeon: Georgina Richardson MD;  Location: [facility] GI     ZZC TOTAL HIP ARTHROPLASTY  04    RT     Family History   Problem Relation Age of Onset     Thyroid Disease Mother      Hypertension Mother      Skin Cancer Mother         MMohs surgery with skin graft     Cerebrovascular Disease Mother         CVA after endarderectomy-      Diabetes Mother      Depression Mother      Alcoholism Father      Heart Disease Father      Substance Abuse Father              Heart Disease Sister         multiple ablations     Alcoholism Sister      Substance Abuse Sister         Sober     Depression Sister      Substance Abuse Sister         Sober 30 plus years     Depression Sister      Thyroid Disease Sister      Hypertension Maternal Grandmother      Depression Maternal Grandmother      Breast Cancer Paternal Grandmother      Pancreatic Cancer Paternal Grandmother      Prostate Cancer Paternal Grandfather      Cardiovascular Paternal Aunt      Cardiovascular Paternal Uncle      Depression Cousin      Depression Other      Social History     Socioeconomic History     Marital status: Single   Occupational History     Occupation: registered nurse     Comment: [employer]   Tobacco Use     Smoking status: Former Smoker     Packs/day: 1.00     Years: 33.00     Pack years: 33.00     Types: Cigarettes     Start date: 1982     Quit date: 2019     Years since quittin.7     Smokeless tobacco: Never Used   Substance and Sexual Activity     Alcohol use: Yes     Alcohol/week: 0.0 standard drinks     Comment: occassional     Drug use: No     Sexual activity: Never  "    KATARZYNA-7 SCORE 12/18/2019 6/16/2022 6/20/2022   Total Score - 5 (mild anxiety) -   Total Score 3 5 6     PHQ 5/4/2020 9/15/2020 6/20/2022   PHQ-9 Total Score 12 17 6   Q9: Thoughts of better off dead/self-harm past 2 weeks Not at all Several days Not at all       ROS    EXAM:  /88   Pulse 103   Ht 1.676 m (5' 6\")   Wt 132.3 kg (291 lb 11.2 oz)   BMI 47.08 kg/m      General appearance: Pleasant female in no acute distress.     BREAST EXAM:  Breast: Without visible skin changes. No dimpling or lesions seen.   Breasts supple, non-tender with palpation, no dominant mass, nodularity, or nipple discharge noted bilaterally. Axillary nodes negative.      PELVIC EXAM:  EG/BUS: Normal genital architecture without lesions, erythema or abnormal secretions Bartholin's, Urethra, Register's normal   Urethral meatus: normal  Urethra: no masses, tenderness, or scarring  Bladder: no masses or tenderness  Vagina: moist, pale pink, rugae and atrophic, thin, dry with creamy, white and odorless  Secretions. Narrowing introitus, admits mod kamini speculum and one digit  Cervix: no lesions  Uterus: anteverted,  and small, smooth, firm, mobile w/o pain  Adnexa: Within normal limits and No masses, nodularity, tenderness  Rectum:anus normal       ASSESSMENT:  Encounter Diagnoses   Name Primary?     Screening for malignant neoplasm of cervix      Encounter for gynecological examination without abnormal finding Yes      62 year old Female Pelvic and Breast Exam    PLAN:   Orders Placed This Encounter   Procedures     Pelvic and Breast Exam Procedure []     Pap Smear Exam []     - Mammogram completed today, 6/20/22, breast self awareness taught  -Continue primary care and mental health care with established provider  -If pap/HPV, nml/neg, pt will not need pap at age 67  PRN for preventive care or problems/concerns.  -Verbalized understanding and agreement with visit plan.    Estella MAHARAJ, RN, WHNP Student, am serving " as a scribe; to document services personally performed by  Hyun Mcneal CNM, APRN based on data collection and the provider's statements to me.     Estella MCKEON, RN, WHNP Student  I agree with the PFSH and ROS as completed by the student, except for changes made by me. The remainder of the encounter was performed by me and scribed by the student. The scribed note accurately reflects my personal services and decisions made by me.  Hyun Mcneal, DNP, CNM, APRN

## 2022-06-23 ENCOUNTER — HOSPITAL ENCOUNTER (OUTPATIENT)
Dept: PHYSICAL THERAPY | Facility: CLINIC | Age: 62
Discharge: HOME | End: 2022-06-23
Attending: FAMILY MEDICINE
Payer: COMMERCIAL

## 2022-06-23 DIAGNOSIS — Z74.09 IMPAIRED FUNCTIONAL MOBILITY AND ACTIVITY TOLERANCE: Primary | ICD-10-CM

## 2022-06-23 DIAGNOSIS — M62.81 GENERALIZED MUSCLE WEAKNESS: ICD-10-CM

## 2022-06-23 LAB
BKR LAB AP GYN ADEQUACY: NORMAL
BKR LAB AP GYN INTERPRETATION: NORMAL
BKR LAB AP HPV REFLEX: NORMAL
BKR LAB AP PREVIOUS ABNORMAL: NORMAL
PATH REPORT.COMMENTS IMP SPEC: NORMAL
PATH REPORT.COMMENTS IMP SPEC: NORMAL
PATH REPORT.RELEVANT HX SPEC: NORMAL

## 2022-06-23 PROCEDURE — 97110 THERAPEUTIC EXERCISES: CPT | Mod: GP

## 2022-06-23 PROCEDURE — 97162 PT EVAL MOD COMPLEX 30 MIN: CPT | Mod: GP

## 2022-06-24 NOTE — PROGRESS NOTES
[facility]                                                                                   OUTPATIENT PHYSICAL THERAPY FUNCTIONAL EVALUATION  PLAN OF TREATMENT FOR OUTPATIENT REHABILITATION  (COMPLETE FOR INITIAL CLAIMS ONLY)  Patient's Last Name, First Name, M.I.  YOB: 1960  MichaelShira  BRITTANI     Provider's Name   [facility]   Medical Record No.  [id number]     Start of Care Date:  06/23/22   Onset Date:  06/01/22   Type:     _X__PT   ____OT  ____SLP Medical Diagnosis:  generalized muscle weakness     PT Diagnosis:  impaired gait and balance Visits from SOC:  1                              __________________________________________________________________________________  Plan of Treatment/Functional Goals:  balance training, gait training, neuromuscular re-education, ROM, strengthening, stretching           GOALS  TUG  Patient to improve on TUG to <12 seconds with no AD to display improve safety navigating home environment and decreased falls risk.  09/21/22    Gait Speed  Patient to improve gait speed to >0.8 m/s in order to display improved safety with home and community ambulation.  09/21/22    Functional LE Strength  - 30 STS  Patient to improve with 30 second sit to stand to 9 repetitions to display improved LE endurance for greater tolerance and safety with completion of transfers from chairs, bed and in/out of cars.  09/21/22    HEP  Patient to independently complete regular exercise program with correct mechanics in order to safely manage impairments upon discharge from skilled therapy.  09/21/22                 Therapy Frequency:  1 time/week (decrease frequency as indicated in POC)   Predicted Duration of Therapy Intervention:  up to 90 days    Ally De Jesus, PT                                    I CERTIFY THE NEED FOR THESE SERVICES FURNISHED  UNDER        THIS PLAN OF TREATMENT AND WHILE UNDER MY CARE     (Physician co-signature of this document indicates review and certification of the therapy plan).                Certification Date From:  06/23/22   Certification Date To:  09/21/22    Referring Provider:  Ajnel Busby MD    Initial Assessment  See Epic Evaluation- Start of Care Date: 06/23/22

## 2022-06-24 NOTE — PROGRESS NOTES
06/23/22 0943   Quick Adds   Quick Adds Certification   Type of Visit Initial OP PT Evaluation   General Information   Start of Care Date 06/23/22   Referring Physician Anjel Busby MD   Orders Evaluate and Treat as Indicated   Order Date 06/01/22   Medical Diagnosis generalized muscle weakness   Onset of illness/injury or Date of Surgery 06/01/22   Surgical/Medical history reviewed Yes   Pertinent history of current problem (include personal factors and/or comorbidities that impact the POC) Patient presents to OP PT from PCP for decontioning. Patient preciously seen for PT in clinic in 2020 following hospitlization for respiratory failure. Patient feels like they have regressed since discharge. Patient currenty with difficulty with household chorse - takes 2 bouts to complete dishes, everything has been taking longer. Not currently completing any exercise. Very limted tolerance for standing - reports generalized feeling that she has to sit down. PMH: Chronic bronchitis 07/30/2015, Contact dermatitis, Depressive disorder, H/O total knee replacement, left, History of total hip replacement 2021, HTN, HLD, OA, CYRUS   Prior level of function comment Prior to hospilization in 2019 - no difficulty with completing household tasks   Patient role/Employment history Disabled   Home/Community Accessibility Comments no stairs in home   Assistive Devices Comments has cane and walker but not currently using   Patient/Family Goals Statement improve stamina   Fall Risk Screen   Fall screen completed by PT   Have you fallen 2 or more times in the past year? No   Have you fallen and had an injury in the past year? No   Timed Up and Go score (seconds) 14.05   Is patient a fall risk? Yes;Department fall risk interventions implemented   Abuse Screen (yes response referral indicated)   Feels Unsafe at Home or Work/School no   Feels Threatened by Someone no   Does Anyone Try to Keep You From Having Contact with Others or Doing  Things Outside Your Home? no   Physical Signs of Abuse Present no   Pain   Patient currently in pain No   Pain comments Denies in clinic, back pain with walking   Vitals Signs   Heart Rate 110   SpO2 95   Blood Pressure 137/83   Vital Signs Comments After 150 feet of walking - HR to 140-145 BPM and SpO2 down to 86% after 250 feet of walking with AD.   Cognitive Status Examination   Cognitive Comment alert, responds to multi-step commands   Observation   Observation reports to clinic without AD   Posture   Posture Forward head position;Protracted shoulders   Strength   Strength Comments LE MMT: hip flexion 3+/5 bilaterally, knee flexion/ext and ankle DF 5/5   Gait Special Tests   Gait Special Tests 25 FOOT TIMED WALK;DYNAMIC GAIT INDEX;SIX MINUTE WALK TEST   Gait Special Tests 25 Foot Timed Walk   Seconds 11.01   Comments Gait speed: 0.69 m/s Patient amb with shortened step length and decreased foot clearance bilaterally   Gait Special Tests Six Minute Walk Test   Comments not appropriate this date, resting  BPM   Balance Special Tests   Balance Special Tests Timed up and go;Modified CTSIB Conditions;Romberg;Sit to stand reps   Balance Special Tests Timed Up and Go   Seconds 14.08 Seconds   Comments no AD   Balance Special Tests Romberg   Seconds 30 Seconds   Comments EO 30 seconds EC 16 seconds - limited by fatgiue and need to sit down   Balance Special Tests Sit to Stand Reps in 30 Seconds   Reps in 30 seconds 5   Height 18   Modality Interventions   Planned Modality Interventions Comments Per therapist discretion   Planned Therapy Interventions   Planned Therapy Interventions balance training;gait training;neuromuscular re-education;ROM;strengthening;stretching   Clinical Impression   Criteria for Skilled Therapeutic Interventions Met yes, treatment indicated   PT Diagnosis impaired gait and balance   Influenced by the following impairments decreased aerobic capacity with significant activity tolerance,  decreased force production, static and dynamic balance impairments   Functional limitations due to impairments transfers, home and commmunity ambulation   Clinical Presentation Evolving/Changing   Clinical Presentation Rationale PMH   Clinical Decision Making (Complexity) Moderate complexity   Therapy Frequency 1 time/week  (decrease frequency as indicated in POC)   Predicted Duration of Therapy Intervention (days/wks) up to 90 days   Risk & Benefits of therapy have been explained Yes   Patient, Family & other staff in agreement with plan of care Yes   Clinical Impression Comments Patients presents with above impairments and has significant declined in balance, strength, and endurance since discharge from skilled therapy which is currently limiting ability to complete ADLs. WIll benefit from skilled therapy to address impairments to improve safety with functional mobility.   GOALS   PT Eval Goals 1;2;3;4   Goal 1   Goal Identifier TUG   Goal Description Patient to improve on TUG to <12 seconds with no AD to display improve safety navigating home environment and decreased falls risk.   Goal Progress baseline: 14.08 seconds, declined from 10.05 seconds at end of last bout of therapt   Target Date 09/21/22   Goal 2   Goal Identifier Gait Speed   Goal Description Patient to improve gait speed to >0.8 m/s in order to display improved safety with home and community ambulation.   Goal Progress current: 0.69 m/s without AD, declined from 0.8 m/s at dischage from last bout of therapy   Target Date 09/21/22   Goal 3   Goal Identifier Functional LE Strength  - 30 STS   Goal Description Patient to improve with 30 second sit to stand to 9 repetitions to display improved LE endurance for greater tolerance and safety with completion of transfers from chairs, bed and in/out of cars.   Goal Progress baseline: 5 rep   Target Date 09/21/22   Goal 4   Goal Identifier HEP   Goal Description Patient to independently complete regular  exercise program with correct mechanics in order to safely manage impairments upon discharge from skilled therapy.   Target Date 09/21/22   Total Evaluation Time   PT Eval, Moderate Complexity Minutes (70234) 35   Therapy Certification   Certification date from 06/23/22   Certification date to 09/21/22   Medical Diagnosis generalized muscle weakness   Certification I certify the need for these services furnished under this plan of treatment and while under my care.  (Physician co-signature of this document indicates review and certification of the therapy plan).

## 2022-06-27 LAB
HUMAN PAPILLOMA VIRUS 16 DNA: NEGATIVE
HUMAN PAPILLOMA VIRUS 18 DNA: NEGATIVE
HUMAN PAPILLOMA VIRUS FINAL DIAGNOSIS: NORMAL
HUMAN PAPILLOMA VIRUS OTHER HR: NEGATIVE

## 2022-06-29 ENCOUNTER — OFFICE VISIT (OUTPATIENT)
Dept: PULMONOLOGY | Facility: CLINIC | Age: 62
End: 2022-06-29
Attending: INTERNAL MEDICINE
Payer: COMMERCIAL

## 2022-06-29 VITALS
HEIGHT: 66 IN | BODY MASS INDEX: 46.77 KG/M2 | RESPIRATION RATE: 17 BRPM | WEIGHT: 291 LBS | OXYGEN SATURATION: 96 % | HEART RATE: 108 BPM | DIASTOLIC BLOOD PRESSURE: 87 MMHG | SYSTOLIC BLOOD PRESSURE: 125 MMHG

## 2022-06-29 DIAGNOSIS — M05.741 RHEUMATOID ARTHRITIS INVOLVING BOTH HANDS WITH POSITIVE RHEUMATOID FACTOR (H): ICD-10-CM

## 2022-06-29 DIAGNOSIS — M05.742 RHEUMATOID ARTHRITIS INVOLVING BOTH HANDS WITH POSITIVE RHEUMATOID FACTOR (H): ICD-10-CM

## 2022-06-29 DIAGNOSIS — R05.3 CHRONIC COUGH: Primary | ICD-10-CM

## 2022-06-29 DIAGNOSIS — R05.3 CHRONIC COUGH: ICD-10-CM

## 2022-06-29 PROCEDURE — G0463 HOSPITAL OUTPT CLINIC VISIT: HCPCS

## 2022-06-29 PROCEDURE — 94726 PLETHYSMOGRAPHY LUNG VOLUMES: CPT | Performed by: INTERNAL MEDICINE

## 2022-06-29 PROCEDURE — 94375 RESPIRATORY FLOW VOLUME LOOP: CPT | Performed by: INTERNAL MEDICINE

## 2022-06-29 PROCEDURE — 99214 OFFICE O/P EST MOD 30 MIN: CPT | Mod: 25 | Performed by: INTERNAL MEDICINE

## 2022-06-29 PROCEDURE — 94729 DIFFUSING CAPACITY: CPT | Performed by: INTERNAL MEDICINE

## 2022-06-29 RX ORDER — ALBUTEROL SULFATE 90 UG/1
2 AEROSOL, METERED RESPIRATORY (INHALATION) EVERY 6 HOURS PRN
Qty: 18 G | Refills: 5 | Status: SHIPPED | OUTPATIENT
Start: 2022-06-29 | End: 2024-01-31

## 2022-06-29 ASSESSMENT — PAIN SCALES - GENERAL: PAINLEVEL: NO PAIN (0)

## 2022-06-29 NOTE — PROGRESS NOTES
[facility]  June 29, 2022         Assessment and Plan:   Shira Rodriguez is a 63 yo F with medical history significant for chronic bronchitis, rheumatoid arthritis, obstructive sleep apnea, prior tobacco dependence who is in the pulmonary clinic for follow-up of chronic cough.  She has previously seen Dr. Richardson, last in 2020.     Etiology of chronic cough is unknown at this time and may be secondary to chronic bronchitis given smoking history.  Recent chest CT with ill-defined groundglass opacities and interlobular septal thickening as well as dilated pulmonary artery.  PFTs suggest early reactive airway disease we will trial an albuterol inhaler.    1.  Chronic productive cough  2.  Prior tobacco dependence  3.  CT with ill-defined GGO and interlobular septal thickening, dilated PA  4.  Rheumatoid arthritis, previously on methotrexate approximately 4 years      Recommendations as follows:  -Trial albuterol inhaler for cough; use aerobika for airway clearance  -Augmentin x2 weeks  -Chest CT in about 2 to 4 weeks to evaluate GGO  -VQ scan to evaluate etiology of dilated PA; consider referral to cardiology for pulmonary hypertension eval  -Consider antacid medication and  - try to increase regular activity with a goal of at least 3 sessions/week of 30 minutes of cardiovascular exercise; start slow and work up to this goal  -Stay up-to-date with yearly influenza vaccines, pneumonia series (prevnar 13 and pneumovax), and Covid shots    Return to clinic in approximately 3 months.     Indira Pizano MD  Pulmonary, Allergy, Critical Care, and Sleep Medicine   Pager: [phone]    This note was created using dictation software and may contain errors.  Please contact the creator for any clarifications that are needed.    I have spent 30 minutes on the day of the visit to review the chart, interview and examine the patient, review labs and imaging, formulate a plan,  "document and submit orders. Time documented is excluding time spent for PFT interpretation        HPI:    Shira Rodriguez is a 63 yo F with medical history significant for chronic bronchitis, rheumatoid arthritis, obstructive sleep apnea, prior tobacco dependence who is in the pulmonary clinic for follow-up of chronic cough.  She has previously seen Dr. Richardson, last in 2020.     She has had a cough for greater than 5 years.  This was initially attributed to smoking but she has not smoked for 3 years and it continues to be productive of yellow/white mucus.  She does feel like there are \"plugs\" that come out.  The cough is worse at night despite sleeping at 45 degree angle chronically.  She denies hemoptysis.    Cough is worse in regards to frequency.  There are no associations with food, no aspiration, no acid reflux or postnasal drainage.  Also worse when she first wakes up for which she has to clear a lot of mucus initially.  She denies dental issues, recurrent infections.  She does state that her voice does get hoarse intermittently when she is mobilizing mucus but clears with coughing.    She was previously on methotrexate (3 to 4 years) but she stopped taking this about a month ago for her RA and she ran out.  She has not noticed any changes in her rheumatoid symptoms which are predominantly in her hands.  Symptoms have always been quite mild and her diagnosis was predominantly from blood work.  She does have an upcoming appointment with her rheumatologist in the next month.    She is not currently on inhalers but used albuterol many years ago.  She denies occupational or home exposures to mold, pets, asbestos, tuberculosis.           Review of Systems:     A 13 point ROS was performed and was negative except as listed above in the HPI.           Past Medical and Surgical History:     Past Medical History:   Diagnosis Date     Chronic bronchitis (H) 07/30/2015     Contact dermatitis      Depressive disorder " 1985     Eczema     HANDS     Elevated liver enzymes      H/O total knee replacement, left      History of total hip replacement 10/27/2011     Hyperlipidemia      Hypertension      Morbid obesity (H)      Osteoarthrosis     right knee     RA (rheumatoid arthritis) (H) 2018     Sleep apnea      Tobacco use disorder      Varicella 1965     Past Surgical History:   Procedure Laterality Date     ARTHROPLASTY KNEE  2012    Procedure: ARTHROPLASTY KNEE;  Left Total Knee Arthroplasty;  Surgeon: Annette Quesada MD;  [facility]     ARTHROSCOPY HIP Right      BRONCHOSCOPY FLEXIBLE AND RIGID N/A 2019    Procedure: Bronchoscopy flexible;  Surgeon: Patrick Rayo MD;  [facility]     COLONOSCOPY N/A 2021    Procedure: COLONOSCOPY, WITH POLYPECTOMY AND BIOPSY;  Surgeon: Diogo Lynch MD;  [facility]     CV EXTRACORPERAL MEMBRANE OXYGENATION N/A 2019    Procedure: Extracorporeal Membrance Oxygenation;  Surgeon: Kaleb Mcfarlane MD;  [facility]     INSERT EXTRACORPORAL MEMBRANE OXYGENATOR N/A 2019    Procedure: Venous-Venous cannulation using ultrasound guidance and transesophageal echocardiogram, venous-arterial ecmo decannulation and repair of left femoral artery;  Surgeon: Patrick Rayo MD;  [facility]     IR MECH THROMB PRIM ART, NON-INTRACRNL  9/10/2019     IR PULMONARY ANGIOGRAM BILATERAL  9/10/2019     IR THROMBOLYSIS ARTERIAL INFUSION INITIAL DAY  9/10/2019     THORACENTESIS N/A 2020    Procedure: THORACENTESIS;  Surgeon: Georgina Richardson MD;  [facility]     ZZC TOTAL HIP ARTHROPLASTY  04    RT           Family History:     Family History   Problem Relation Age of Onset     Thyroid Disease Mother      Hypertension Mother      Skin Cancer Mother         MMohs surgery with skin graft     Cerebrovascular Disease Mother         CVA after endarderectomy-      Diabetes Mother      Depression  Mother      Alcoholism Father      Heart Disease Father      Substance Abuse Father              Heart Disease Sister         multiple ablations     Alcoholism Sister      Substance Abuse Sister         Sober     Depression Sister      Substance Abuse Sister         Sober 30 plus years     Depression Sister      Thyroid Disease Sister      Hypertension Maternal Grandmother      Depression Maternal Grandmother      Breast Cancer Paternal Grandmother      Pancreatic Cancer Paternal Grandmother      Prostate Cancer Paternal Grandfather      Cardiovascular Paternal Aunt      Cardiovascular Paternal Uncle      Depression Cousin      Depression Other             Social History:     Social History     Socioeconomic History     Marital status: Single     Spouse name: Not on file     Number of children: Not on file     Years of education: Not on file     Highest education level: Not on file   Occupational History     Occupation: registered nurse     Comment: [employer]   Tobacco Use     Smoking status: Former Smoker     Packs/day: 0.00     Years: 33.00     Pack years: 0.00     Types: Cigarettes, Cigarettes     Start date: 1982     Quit date: 2019     Years since quittin.8     Smokeless tobacco: Never Used   Vaping Use     Vaping Use: Never used   Substance and Sexual Activity     Alcohol use: Yes     Comment: Rarely     Drug use: No     Sexual activity: Not Currently     Birth control/protection: Abstinence, Post-menopausal   Other Topics Concern     Parent/sibling w/ CABG, MI or angioplasty before 65F 55M? Not Asked   Social History Narrative     Not on file     Social Determinants of Health     Financial Resource Strain: Not on file   Food Insecurity: Not on file   Transportation Needs: Not on file   Physical Activity: Not on file   Stress: Not on file   Social Connections: Not on file   Intimate Partner Violence: Not on file   Housing Stability: Not on file            Medications:     Current  "Outpatient Medications   Medication     acetaminophen (TYLENOL) 325 MG tablet     ARIPiprazole (ABILIFY) 10 MG tablet     DULoxetine (CYMBALTA) 60 MG capsule     folic acid (FOLVITE) 1 MG tablet     insulin syringe-needle U-100 (BD INSULIN SYRINGE ULTRAFINE) 30G X 1/2\" 1 ML miscellaneous     lisinopril (ZESTRIL) 5 MG tablet     methotrexate 50 MG/2ML injection     VITAMIN D3 25 MCG (1000 UT) tablet     warfarin ANTICOAGULANT (COUMADIN) 5 MG tablet     atorvastatin (LIPITOR) 40 MG tablet     No current facility-administered medications for this visit.     Facility-Administered Medications Ordered in Other Visits   Medication     sodium chloride (PF) 0.9% PF flush 10 mL            Physical Exam:   Resp 17   Ht 1.676 m (5' 6\")   Wt 132 kg (291 lb)   BMI 46.97 kg/m      Constitutional:   Awake, alert and in no apparent distress     Eyes:   nonicteric     HEENT:   Supple without supraclavicular or cervical lymphadenopathy     Lungs:   Good air flow.  No crackles. No rhonchi.  No wheezes.     Cardiovascular:   Normal S1 and S2.  RRR.  No murmur, gallop or rub.     Musculoskeletal:   No edema, no digital clubbing present     Neurologic:   Alert and conversant.     Skin:   Warm, dry.  No rash on limited exam.             Data:   All laboratory and imaging data reviewed personally.      Specimen Description   Date Value Ref Range Status   01/16/2020 Pleural fluid Left lower lobe  Final   01/16/2020 Pleural fluid Left lower lobe  Final   10/04/2019 Pleural fluid  Final   10/04/2019 Pleural fluid  Final    Culture Micro   Date Value Ref Range Status   01/16/2020 No growth  Final   10/04/2019 No growth  Final   09/20/2019 No growth  Final        No results found for this or any previous visit (from the past 168 hour(s)).    PFT: 9/4/20 - Possible early reactive airway disease as evidence by reduced FEV1 and FEV with normal FEV1/FVC. TLC is wnl without evidence of air trapping or hyperinflation. Normal diffusion capacity. "     CT chest- 5/24/2022-personally reviewed:  Small left-sided pleural effusion, mild bibasilar atelectasis.  Right lower lobe granuloma.  GGO in the posterior right lower lobe.  Dilated pulmonary artery.                                                                   IMPRESSION:      1. Stable small left pleural effusion.  2. Patchy areas of ill-defined groundglass opacity are increased  compared to prior, and likely represent areas of  infection/inflammation.  3. Unchanged dilation of the main pulmonary artery, which can be seen  in pulmonary hypertension.  4. Cholelithiasis without evidence for acute cholecystitis.  5. Stable 3.8 cm right adrenal adenoma.

## 2022-06-29 NOTE — LETTER
6/29/2022         RE: Shira Rodriguez  [address]  [place] [address]        Dear Colleague,    Thank you for referring your patient, Shira Rodriguez, to the [facility] [place]. Please see a copy of my visit note below.    [facility]  June 29, 2022         Assessment and Plan:   Shira Rodriguez is a 63 yo F with medical history significant for chronic bronchitis, rheumatoid arthritis, obstructive sleep apnea, prior tobacco dependence who is in the pulmonary clinic for follow-up of chronic cough.  She has previously seen Dr. Richardson, last in 2020.     Etiology of chronic cough is unknown at this time and may be secondary to chronic bronchitis given smoking history.  Recent chest CT with ill-defined groundglass opacities and interlobular septal thickening as well as dilated pulmonary artery.  PFTs suggest early reactive airway disease we will trial an albuterol inhaler.    1.  Chronic productive cough  2.  Prior tobacco dependence  3.  CT with ill-defined GGO and interlobular septal thickening, dilated PA  4.  Rheumatoid arthritis, previously on methotrexate approximately 4 years      Recommendations as follows:  -Trial albuterol inhaler for cough; use aerobika for airway clearance  -Augmentin x2 weeks  -Chest CT in about 2 to 4 weeks to evaluate GGO  -VQ scan to evaluate etiology of dilated PA; consider referral to cardiology for pulmonary hypertension eval  -Consider antacid medication and  - try to increase regular activity with a goal of at least 3 sessions/week of 30 minutes of cardiovascular exercise; start slow and work up to this goal  -Stay up-to-date with yearly influenza vaccines, pneumonia series (prevnar 13 and pneumovax), and Covid shots    Return to clinic in approximately 3 months.     Indira Pizano MD  Pulmonary, Allergy, Critical Care, and Sleep Medicine  "  Pager: [phone]    This note was created using dictation software and may contain errors.  Please contact the creator for any clarifications that are needed.    I have spent 30 minutes on the day of the visit to review the chart, interview and examine the patient, review labs and imaging, formulate a plan, document and submit orders. Time documented is excluding time spent for PFT interpretation        HPI:    Shira Rodriguez is a 61 yo F with medical history significant for chronic bronchitis, rheumatoid arthritis, obstructive sleep apnea, prior tobacco dependence who is in the pulmonary clinic for follow-up of chronic cough.  She has previously seen Dr. Richardson, last in 2020.     She has had a cough for greater than 5 years.  This was initially attributed to smoking but she has not smoked for 3 years and it continues to be productive of yellow/white mucus.  She does feel like there are \"plugs\" that come out.  The cough is worse at night despite sleeping at 45 degree angle chronically.  She denies hemoptysis.    Cough is worse in regards to frequency.  There are no associations with food, no aspiration, no acid reflux or postnasal drainage.  Also worse when she first wakes up for which she has to clear a lot of mucus initially.  She denies dental issues, recurrent infections.  She does state that her voice does get hoarse intermittently when she is mobilizing mucus but clears with coughing.    She was previously on methotrexate (3 to 4 years) but she stopped taking this about a month ago for her RA and she ran out.  She has not noticed any changes in her rheumatoid symptoms which are predominantly in her hands.  Symptoms have always been quite mild and her diagnosis was predominantly from blood work.  She does have an upcoming appointment with her rheumatologist in the next month.    She is not currently on inhalers but used albuterol many years ago.  She denies occupational or home exposures to mold, pets, asbestos, " tuberculosis.           Review of Systems:     A 13 point ROS was performed and was negative except as listed above in the HPI.           Past Medical and Surgical History:     Past Medical History:   Diagnosis Date     Chronic bronchitis (H) 07/30/2015     Contact dermatitis      Depressive disorder 1985     Eczema     HANDS     Elevated liver enzymes      H/O total knee replacement, left      History of total hip replacement 10/27/2011     Hyperlipidemia      Hypertension      Morbid obesity (H)      Osteoarthrosis     right knee     RA (rheumatoid arthritis) (H) 8/2018     Sleep apnea      Tobacco use disorder      Varicella 1965     Past Surgical History:   Procedure Laterality Date     ARTHROPLASTY KNEE  6/14/2012    Procedure: ARTHROPLASTY KNEE;  Left Total Knee Arthroplasty;  Surgeon: Annette Quesada MD;  [facility]     ARTHROSCOPY HIP Right      BRONCHOSCOPY FLEXIBLE AND RIGID N/A 9/17/2019    Procedure: Bronchoscopy flexible;  Surgeon: Patrick Rayo MD;  [facility]     COLONOSCOPY N/A 7/16/2021    Procedure: COLONOSCOPY, WITH POLYPECTOMY AND BIOPSY;  Surgeon: Diogo Lynch MD;  [facility]     CV EXTRACORPERAL MEMBRANE OXYGENATION N/A 9/9/2019    Procedure: Extracorporeal Membrance Oxygenation;  Surgeon: Kaleb Mcfarlane MD;  Location:  HEART CARDIAC CATH LAB     INSERT EXTRACORPORAL MEMBRANE OXYGENATOR N/A 9/17/2019    Procedure: Venous-Venous cannulation using ultrasound guidance and transesophageal echocardiogram, venous-arterial ecmo decannulation and repair of left femoral artery;  Surgeon: Patrick Rayo MD;  [facility]     IR MECH THROMB PRIM ART, NON-INTRACRNL  9/10/2019     IR PULMONARY ANGIOGRAM BILATERAL  9/10/2019     IR THROMBOLYSIS ARTERIAL INFUSION INITIAL DAY  9/10/2019     THORACENTESIS N/A 1/16/2020    Procedure: THORACENTESIS;  Surgeon: Georgina Richardson MD;  [facility]     ZZC TOTAL HIP ARTHROPLASTY  07/09/04    RT            Family History:     Family History   Problem Relation Age of Onset     Thyroid Disease Mother      Hypertension Mother      Skin Cancer Mother         MMohs surgery with skin graft     Cerebrovascular Disease Mother         CVA after endarderectomy-      Diabetes Mother      Depression Mother      Alcoholism Father      Heart Disease Father      Substance Abuse Father              Heart Disease Sister         multiple ablations     Alcoholism Sister      Substance Abuse Sister         Sober     Depression Sister      Substance Abuse Sister         Sober 30 plus years     Depression Sister      Thyroid Disease Sister      Hypertension Maternal Grandmother      Depression Maternal Grandmother      Breast Cancer Paternal Grandmother      Pancreatic Cancer Paternal Grandmother      Prostate Cancer Paternal Grandfather      Cardiovascular Paternal Aunt      Cardiovascular Paternal Uncle      Depression Cousin      Depression Other             Social History:     Social History     Socioeconomic History     Marital status: Single     Spouse name: Not on file     Number of children: Not on file     Years of education: Not on file     Highest education level: Not on file   Occupational History     Occupation: registered nurse     Comment: [employer]   Tobacco Use     Smoking status: Former Smoker     Packs/day: 0.00     Years: 33.00     Pack years: 0.00     Types: Cigarettes, Cigarettes     Start date: 1982     Quit date: 2019     Years since quittin.8     Smokeless tobacco: Never Used   Vaping Use     Vaping Use: Never used   Substance and Sexual Activity     Alcohol use: Yes     Comment: Rarely     Drug use: No     Sexual activity: Not Currently     Birth control/protection: Abstinence, Post-menopausal   Other Topics Concern     Parent/sibling w/ CABG, MI or angioplasty before 65F 55M? Not Asked   Social History Narrative     Not on file     Social Determinants of Health     Financial  "Resource Strain: Not on file   Food Insecurity: Not on file   Transportation Needs: Not on file   Physical Activity: Not on file   Stress: Not on file   Social Connections: Not on file   Intimate Partner Violence: Not on file   Housing Stability: Not on file            Medications:     Current Outpatient Medications   Medication     acetaminophen (TYLENOL) 325 MG tablet     ARIPiprazole (ABILIFY) 10 MG tablet     DULoxetine (CYMBALTA) 60 MG capsule     folic acid (FOLVITE) 1 MG tablet     insulin syringe-needle U-100 (BD INSULIN SYRINGE ULTRAFINE) 30G X 1/2\" 1 ML miscellaneous     lisinopril (ZESTRIL) 5 MG tablet     methotrexate 50 MG/2ML injection     VITAMIN D3 25 MCG (1000 UT) tablet     warfarin ANTICOAGULANT (COUMADIN) 5 MG tablet     atorvastatin (LIPITOR) 40 MG tablet     No current facility-administered medications for this visit.     Facility-Administered Medications Ordered in Other Visits   Medication     sodium chloride (PF) 0.9% PF flush 10 mL            Physical Exam:   Resp 17   Ht 1.676 m (5' 6\")   Wt 132 kg (291 lb)   BMI 46.97 kg/m      Constitutional:   Awake, alert and in no apparent distress     Eyes:   nonicteric     HEENT:   Supple without supraclavicular or cervical lymphadenopathy     Lungs:   Good air flow.  No crackles. No rhonchi.  No wheezes.     Cardiovascular:   Normal S1 and S2.  RRR.  No murmur, gallop or rub.     Musculoskeletal:   No edema, no digital clubbing present     Neurologic:   Alert and conversant.     Skin:   Warm, dry.  No rash on limited exam.             Data:   All laboratory and imaging data reviewed personally.      Specimen Description   Date Value Ref Range Status   01/16/2020 Pleural fluid Left lower lobe  Final   01/16/2020 Pleural fluid Left lower lobe  Final   10/04/2019 Pleural fluid  Final   10/04/2019 Pleural fluid  Final    Culture Micro   Date Value Ref Range Status   01/16/2020 No growth  Final   10/04/2019 No growth  Final   09/20/2019 No growth  " Final        No results found for this or any previous visit (from the past 168 hour(s)).    PFT: 9/4/20 - Possible early reactive airway disease as evidence by reduced FEV1 and FEV with normal FEV1/FVC. TLC is wnl without evidence of air trapping or hyperinflation. Normal diffusion capacity.     CT chest- 5/24/2022-personally reviewed:  Small left-sided pleural effusion, mild bibasilar atelectasis.  Right lower lobe granuloma.  GGO in the posterior right lower lobe.  Dilated pulmonary artery.                                                                   IMPRESSION:      1. Stable small left pleural effusion.  2. Patchy areas of ill-defined groundglass opacity are increased  compared to prior, and likely represent areas of  infection/inflammation.  3. Unchanged dilation of the main pulmonary artery, which can be seen  in pulmonary hypertension.  4. Cholelithiasis without evidence for acute cholecystitis.  5. Stable 3.8 cm right adrenal adenoma.        Again, thank you for allowing me to participate in the care of your patient.        Sincerely,        Indira Pizano MD

## 2022-06-29 NOTE — PATIENT INSTRUCTIONS
Shira,    It was nice meeting you!  We reviewed your symptoms and your prior breathing studies and chest CT. We will trial an antibiotic and repeat imaging with additional testing to evaluate your chronic cough.     Recommendations as follows:   - start augmentin for 2 weeks  - we will consider trialing an antacid medication if no improvement with the antibiotic  - we will repeat pulmonary function testing  - we will repeat chest CT in about 2-4 weeks   - please scheduled a VQ scan to evaluate the dilated pulmonary artery and possible high blood pressure in the lungs   - start using albuterol in response to cough to see if this helps your symptoms; also use the aerobika device at least once a day to thin the mucous   - Please call the pulmonary clinic with any questions. The pulmonary clinic number is: [phone]    Stay up to date with vaccinations including your yearly flu vaccine. Please continue to social distance which does not exclude going outside and enjoying our wonderful weather! Wash your hands regularly. Wear a mask when unable to social distance (such as in the grocery store).  I recommend that you receive the COVID-19 vaccine.     Have a nice summer and stay well! Please let me know if you have questions or concerns.     Smita Pizano MD  Pulmonary, Allergy, Critical Care, and Sleep Medicine   [facility]

## 2022-06-29 NOTE — NURSING NOTE
Chief Complaint   Patient presents with     RECHECK     Return chronic cough     Medications reviewed and vital signs taken.   Ottoniel King, LAMAR

## 2022-07-01 LAB
DLCOUNC-%PRED-PRE: 98 %
DLCOUNC-PRE: 21.31 ML/MIN/MMHG
DLCOUNC-PRED: 21.54 ML/MIN/MMHG
ERV-%PRED-PRE: 396 %
ERV-PRE: 0.67 L
ERV-PRED: 0.17 L
EXPTIME-PRE: 6.49 SEC
FEF2575-%PRED-PRE: 88 %
FEF2575-PRE: 2.06 L/SEC
FEF2575-PRED: 2.33 L/SEC
FEFMAX-%PRED-PRE: 88 %
FEFMAX-PRE: 5.77 L/SEC
FEFMAX-PRED: 6.55 L/SEC
FEV1-%PRED-PRE: 82 %
FEV1-PRE: 2.16 L
FEV1FEV6-PRE: 82 %
FEV1FEV6-PRED: 81 %
FEV1FVC-PRE: 83 %
FEV1FVC-PRED: 79 %
FEV1SVC-PRE: 81 %
FEV1SVC-PRED: 75 %
FIFMAX-PRE: 3.85 L/SEC
FRCPLETH-%PRED-PRE: 74 %
FRCPLETH-PRE: 2.09 L
FRCPLETH-PRED: 2.82 L
FVC-%PRED-PRE: 77 %
FVC-PRE: 2.62 L
FVC-PRED: 3.36 L
IC-%PRED-PRE: 59 %
IC-PRE: 1.99 L
IC-PRED: 3.34 L
RVPLETH-%PRED-PRE: 70 %
RVPLETH-PRE: 1.42 L
RVPLETH-PRED: 2.01 L
TLCPLETH-%PRED-PRE: 77 %
TLCPLETH-PRE: 4.08 L
TLCPLETH-PRED: 5.27 L
VA-%PRED-PRE: 73 %
VA-PRE: 3.79 L
VC-%PRED-PRE: 75 %
VC-PRE: 2.66 L
VC-PRED: 3.5 L

## 2022-07-06 ENCOUNTER — TELEPHONE (OUTPATIENT)
Dept: PULMONOLOGY | Facility: CLINIC | Age: 62
End: 2022-07-06

## 2022-07-06 DIAGNOSIS — I26.09 PULMONARY EMBOLISM WITH ACUTE COR PULMONALE, UNSPECIFIED CHRONICITY, UNSPECIFIED PULMONARY EMBOLISM TYPE (H): ICD-10-CM

## 2022-07-06 NOTE — TELEPHONE ENCOUNTER
Patient returned call and she requested both VQ scan and CT be resscheduled for same day. She states she began her two week course of abx yesterday, so will schedule scans for 21st or 22nd. Also discussed that PFT is needed prior to appt with Dr. Pizano in Sept. She is agreeable to this. Discussed I would arrange everything and she is comfortable checking [employer] for official times.

## 2022-07-06 NOTE — TELEPHONE ENCOUNTER
TERESOM with direct call back to discuss rescheduling her CT from tomorrow to a few more weeks out as she is currently on antibiotics (2-4 weeks from the 6/29 visit or a couple days after completing her abx) and this needs to be completed prior to completing the CT scan. Also discussed that we needed to schedule FPFTs prior to her appt with Dr. Pizano in Sept. Requested a call back to discuss. Will also send [employer] message detailing same information

## 2022-07-07 RX ORDER — WARFARIN SODIUM 5 MG/1
TABLET ORAL
Qty: 90 TABLET | Refills: 1 | Status: ON HOLD | OUTPATIENT
Start: 2022-07-07 | End: 2023-02-21

## 2022-07-07 NOTE — TELEPHONE ENCOUNTER
ANTICOAGULATION MANAGEMENT:  Medication Refill    Anticoagulation Summary  As of 6/13/2022    Warfarin maintenance plan:  2.5 mg (5 mg x 0.5) every Wed; 5 mg (5 mg x 1) all other days   Next INR check:  7/11/2022   Target end date:  Indefinite    Indications    Pulmonary emboli (H) [I26.99]  Long term current use of anticoagulant therapy [Z79.01]             Anticoagulation Care Providers     Provider Role Specialty Phone number    Anjel Busby MD Referring Family Medicine [phone]          Visit with referring provider/group within last year: Yes    [facility] referral signed within last year: Yes    Shira meets all criteria for refill (current [facility] referral, office visit with referring provider/group in last year, lab monitoring up to date or not exceeding 2 weeks overdue). Rx instructions and quantity supplied updated to match patient's current dosing plan. Warfarin 90 day supply with 1 refill granted per [facility] protocol     YUNIER DAVIDSON RN  Anticoagulation Clinic

## 2022-07-11 ENCOUNTER — ANTICOAGULATION THERAPY VISIT (OUTPATIENT)
Dept: ANTICOAGULATION | Facility: CLINIC | Age: 62
End: 2022-07-11

## 2022-07-11 ENCOUNTER — LAB (OUTPATIENT)
Dept: LAB | Facility: CLINIC | Age: 62
End: 2022-07-11
Payer: COMMERCIAL

## 2022-07-11 DIAGNOSIS — I26.99 PULMONARY EMBOLI (H): Primary | ICD-10-CM

## 2022-07-11 DIAGNOSIS — Z79.01 LONG TERM CURRENT USE OF ANTICOAGULANT THERAPY: ICD-10-CM

## 2022-07-11 DIAGNOSIS — I26.99 PULMONARY EMBOLI (H): ICD-10-CM

## 2022-07-11 LAB — INR BLD: 3 (ref 0.9–1.1)

## 2022-07-11 PROCEDURE — 85610 PROTHROMBIN TIME: CPT

## 2022-07-11 PROCEDURE — 36416 COLLJ CAPILLARY BLOOD SPEC: CPT

## 2022-07-11 NOTE — PROGRESS NOTES
ANTICOAGULATION MANAGEMENT     Shira Rodriguez 62 year old female is on warfarin with therapeutic INR result. (Goal INR 2.0-3.0)    Recent labs: (last 7 days)     07/11/22  1301   INR 3.0*       ASSESSMENT       Source(s): Patient/Caregiver Call       Warfarin doses taken: Warfarin taken as instructed    Diet: No new diet changes identified    New illness, injury, or hospitalization: No    Medication/supplement changes: None noted    Signs or symptoms of bleeding or clotting: No    Previous INR: Therapeutic last visit; previously outside of goal range    Additional findings: None       PLAN     Recommended plan for no diet, medication or health factor changes affecting INR     Dosing Instructions: continue your current warfarin dose with next INR in 4 weeks       Summary  As of 7/11/2022    Full warfarin instructions:  2.5 mg every Wed; 5 mg all other days   Next INR check:  8/8/2022             Telephone call with Shira who verbalizes understanding and agrees to plan and who agrees to plan and repeated back plan correctly    Lab visit scheduled    Education provided: None required    Plan made per [facility] anticoagulation protocol    Patience Martins RN  Anticoagulation Clinic  7/11/2022    _______________________________________________________________________     Anticoagulation Episode Summary     Current INR goal:  2.0-3.0   TTR:  73.7 % (1 y)   Target end date:  Indefinite   Send INR reminders to:  [facility]    Indications    Pulmonary emboli (H) [I26.99]  Long term current use of anticoagulant therapy [Z79.01]           Comments:           Anticoagulation Care Providers     Provider Role Specialty Phone number    Anjel Busby MD Referring Family Medicine [phone]

## 2022-07-12 ENCOUNTER — TELEPHONE (OUTPATIENT)
Dept: ANTICOAGULATION | Facility: CLINIC | Age: 62
End: 2022-07-12

## 2022-07-12 DIAGNOSIS — Z79.01 LONG TERM CURRENT USE OF ANTICOAGULANT THERAPY: ICD-10-CM

## 2022-07-12 DIAGNOSIS — I26.99 PULMONARY EMBOLI (H): Primary | ICD-10-CM

## 2022-07-12 NOTE — TELEPHONE ENCOUNTER
ANTICOAGULATION  MANAGEMENT     Interacting Medication Review    Interacting medication(s): Levofloxacin (Levaquin) with warfarin.    Duration: 5 days (7/12/22 to 7/17/22)    Indication: Chronic Cough    New medication?: Yes, interaction may increase INR and risk of bleeding. Closer INR monitoring recommended.        PLAN     Continue your current warfarin dose with next INR in 4 days              Telephone call with Shira who verbalizes understanding and agrees to plan and who agrees to plan and repeated back plan correctly    New recheck date updated on anticoagulation calendar     Plan made per [facility] anticoagulation protocol    Patience Martins RN  Anticoagulation Clinic

## 2022-07-15 ENCOUNTER — LAB (OUTPATIENT)
Dept: LAB | Facility: CLINIC | Age: 62
End: 2022-07-15
Payer: COMMERCIAL

## 2022-07-15 ENCOUNTER — ANTICOAGULATION THERAPY VISIT (OUTPATIENT)
Dept: ANTICOAGULATION | Facility: CLINIC | Age: 62
End: 2022-07-15

## 2022-07-15 DIAGNOSIS — Z79.01 LONG TERM CURRENT USE OF ANTICOAGULANT THERAPY: ICD-10-CM

## 2022-07-15 DIAGNOSIS — I26.99 PULMONARY EMBOLI (H): ICD-10-CM

## 2022-07-15 LAB — INR BLD: 1.5 (ref 0.9–1.1)

## 2022-07-15 PROCEDURE — 85610 PROTHROMBIN TIME: CPT

## 2022-07-15 PROCEDURE — 36416 COLLJ CAPILLARY BLOOD SPEC: CPT

## 2022-07-15 NOTE — PROGRESS NOTES
ANTICOAGULATION MANAGEMENT     Shira Rodriguez 62 year old female is on warfarin with subtherapeutic INR result. (Goal INR 2.0-3.0)    Recent labs: (last 7 days)     07/15/22  1359   INR 1.5*       ASSESSMENT       Source(s): Chart Review and Patient/Caregiver Call       Warfarin doses taken: Warfarin taken as instructed    Diet: cough/on antibiotic may be affecting diet and INR    New illness, injury, or hospitalization: Yes: Pt on levaquin until 7/16/22 for a cough. Pt states that the cough is much better.    Medication/supplement changes: levaquin can cause an increase risk of bleeding but may be affecting INR today as well    Signs or symptoms of bleeding or clotting: No    Previous INR: Therapeutic last 2(+) visits    Additional findings:Pt denies any missed doses or diet changes       PLAN       Dosing Instructions: booster dose then continue your current warfarin dose with next INR in 10 days       Summary  As of 7/15/2022    Full warfarin instructions:  7/15: 7.5 mg; Otherwise 2.5 mg every Wed; 5 mg all other days   Next INR check:  7/25/2022             Telephone call with Shira who agrees to plan and repeated back plan correctly    Lab visit scheduled    Education provided: Importance of notifying clinic for changes in medications; a sooner lab recheck maybe needed. and Contact [phone] with any changes, questions or concerns.     Plan made per [facility] anticoagulation protocol    Estee Benson RN  Anticoagulation Clinic  7/15/2022    _______________________________________________________________________     Anticoagulation Episode Summary     Current INR goal:  2.0-3.0   TTR:  74.4 % (1 y)   Target end date:  Indefinite   Send INR reminders to:  [facility]    Indications    Pulmonary emboli (H) [I26.99]  Long term current use of anticoagulant therapy [Z79.01]           Comments:           Anticoagulation Care Providers     Provider Role Specialty Phone number    Anjel Busby MD  [facility] Family Medicine [phone]

## 2022-07-18 ENCOUNTER — HOSPITAL ENCOUNTER (OUTPATIENT)
Dept: PHYSICAL THERAPY | Facility: CLINIC | Age: 62
Discharge: HOME | End: 2022-07-18
Payer: COMMERCIAL

## 2022-07-18 DIAGNOSIS — Z74.09 IMPAIRED FUNCTIONAL MOBILITY, BALANCE, GAIT, AND ENDURANCE: Primary | ICD-10-CM

## 2022-07-18 PROCEDURE — 97110 THERAPEUTIC EXERCISES: CPT | Mod: GP | Performed by: PHYSICAL THERAPIST

## 2022-07-18 PROCEDURE — 97116 GAIT TRAINING THERAPY: CPT | Mod: GP | Performed by: PHYSICAL THERAPIST

## 2022-07-21 ENCOUNTER — HOSPITAL ENCOUNTER (OUTPATIENT)
Dept: CT IMAGING | Facility: CLINIC | Age: 62
Discharge: HOME | End: 2022-07-21
Attending: INTERNAL MEDICINE | Admitting: INTERNAL MEDICINE
Payer: COMMERCIAL

## 2022-07-21 ENCOUNTER — HOSPITAL ENCOUNTER (OUTPATIENT)
Dept: NUCLEAR MEDICINE | Facility: CLINIC | Age: 62
Setting detail: NUCLEAR MEDICINE
Discharge: HOME | End: 2022-07-21
Attending: INTERNAL MEDICINE | Admitting: INTERNAL MEDICINE
Payer: COMMERCIAL

## 2022-07-21 DIAGNOSIS — R05.3 CHRONIC COUGH: ICD-10-CM

## 2022-07-21 PROCEDURE — 71250 CT THORAX DX C-: CPT | Mod: 26 | Performed by: RADIOLOGY

## 2022-07-21 PROCEDURE — 71250 CT THORAX DX C-: CPT

## 2022-07-21 PROCEDURE — 78580 LUNG PERFUSION IMAGING: CPT

## 2022-07-21 PROCEDURE — A9540 TC99M MAA: HCPCS | Performed by: INTERNAL MEDICINE

## 2022-07-21 PROCEDURE — 343N000001 HC RX 343: Performed by: INTERNAL MEDICINE

## 2022-07-21 PROCEDURE — 78580 LUNG PERFUSION IMAGING: CPT | Mod: 26 | Performed by: STUDENT IN AN ORGANIZED HEALTH CARE EDUCATION/TRAINING PROGRAM

## 2022-07-21 RX ADMIN — KIT FOR THE PREPARATION OF TECHNETIUM TC 99M ALBUMIN AGGREGATED 7 MILLICURIE: 2.5 INJECTION, POWDER, FOR SOLUTION INTRAVENOUS at 13:40

## 2022-07-25 ENCOUNTER — LAB (OUTPATIENT)
Dept: LAB | Facility: CLINIC | Age: 62
End: 2022-07-25
Payer: COMMERCIAL

## 2022-07-25 ENCOUNTER — ANTICOAGULATION THERAPY VISIT (OUTPATIENT)
Dept: ANTICOAGULATION | Facility: CLINIC | Age: 62
End: 2022-07-25

## 2022-07-25 DIAGNOSIS — Z79.01 LONG TERM CURRENT USE OF ANTICOAGULANT THERAPY: ICD-10-CM

## 2022-07-25 DIAGNOSIS — I26.99 PULMONARY EMBOLI (H): Primary | ICD-10-CM

## 2022-07-25 DIAGNOSIS — I26.99 PULMONARY EMBOLI (H): ICD-10-CM

## 2022-07-25 LAB — INR BLD: 2.2 (ref 0.9–1.1)

## 2022-07-25 PROCEDURE — 85610 PROTHROMBIN TIME: CPT

## 2022-07-25 PROCEDURE — 36416 COLLJ CAPILLARY BLOOD SPEC: CPT

## 2022-07-25 NOTE — PROGRESS NOTES
ANTICOAGULATION MANAGEMENT     Shira Rodriguez 62 year old female is on warfarin with therapeutic INR result. (Goal INR 2.0-3.0)    Recent labs: (last 7 days)     07/25/22  1259   INR 2.2*       ASSESSMENT       Source(s): Chart Review and Patient/Caregiver Call       Warfarin doses taken: Warfarin taken as instructed    Diet: No new diet changes identified    New illness, injury, or hospitalization: No    Medication/supplement changes: None noted    Signs or symptoms of bleeding or clotting: No    Previous INR: Subtherapeutic    Additional findings: off augmentin last week       PLAN     Recommended plan for no diet, medication or health factor changes affecting INR     Dosing Instructions: Continue your current warfarin dose with next INR in 2 weeks       Summary  As of 7/25/2022    Full warfarin instructions:  2.5 mg every Wed; 5 mg all other days   Next INR check:  8/8/2022             Telephone call with Shira who verbalizes understanding and agrees to plan    Lab visit scheduled    Education provided: None required    Plan made per [facility] anticoagulation protocol    Jess Gomez RN  Anticoagulation Clinic  7/25/2022    _______________________________________________________________________     Anticoagulation Episode Summary     Current INR goal:  2.0-3.0   TTR:  75.2 % (1 y)   Target end date:  Indefinite   Send INR reminders to:  [facility]    Indications    Pulmonary emboli (H) [I26.99]  Long term current use of anticoagulant therapy [Z79.01]           Comments:           Anticoagulation Care Providers     Provider Role Specialty Phone number    Anjel Busby MD Referring Family Medicine [phone]

## 2022-07-26 ENCOUNTER — HOSPITAL ENCOUNTER (OUTPATIENT)
Dept: PHYSICAL THERAPY | Facility: CLINIC | Age: 62
Discharge: HOME | End: 2022-07-26
Payer: COMMERCIAL

## 2022-07-26 DIAGNOSIS — M62.81 GENERALIZED MUSCLE WEAKNESS: ICD-10-CM

## 2022-07-26 DIAGNOSIS — Z74.09 IMPAIRED FUNCTIONAL MOBILITY, BALANCE, GAIT, AND ENDURANCE: Primary | ICD-10-CM

## 2022-07-26 PROCEDURE — 97110 THERAPEUTIC EXERCISES: CPT | Mod: GP

## 2022-08-01 ENCOUNTER — HOSPITAL ENCOUNTER (OUTPATIENT)
Dept: PHYSICAL THERAPY | Facility: CLINIC | Age: 62
Discharge: HOME | End: 2022-08-01
Payer: COMMERCIAL

## 2022-08-01 DIAGNOSIS — Z74.09 IMPAIRED FUNCTIONAL MOBILITY, BALANCE, GAIT, AND ENDURANCE: Primary | ICD-10-CM

## 2022-08-01 PROCEDURE — 97110 THERAPEUTIC EXERCISES: CPT | Mod: GP | Performed by: PHYSICAL THERAPIST

## 2022-08-11 ENCOUNTER — ANTICOAGULATION THERAPY VISIT (OUTPATIENT)
Dept: ANTICOAGULATION | Facility: CLINIC | Age: 62
End: 2022-08-11

## 2022-08-11 ENCOUNTER — LAB (OUTPATIENT)
Dept: LAB | Facility: CLINIC | Age: 62
End: 2022-08-11
Payer: COMMERCIAL

## 2022-08-11 DIAGNOSIS — Z79.01 LONG TERM CURRENT USE OF ANTICOAGULANT THERAPY: ICD-10-CM

## 2022-08-11 DIAGNOSIS — I26.99 PULMONARY EMBOLI (H): Primary | ICD-10-CM

## 2022-08-11 DIAGNOSIS — I26.99 PULMONARY EMBOLI (H): ICD-10-CM

## 2022-08-11 LAB — INR BLD: 3.3 (ref 0.9–1.1)

## 2022-08-11 PROCEDURE — 85610 PROTHROMBIN TIME: CPT

## 2022-08-11 PROCEDURE — 36416 COLLJ CAPILLARY BLOOD SPEC: CPT

## 2022-08-11 NOTE — PROGRESS NOTES
ANTICOAGULATION MANAGEMENT     Shira Rodriguez 62 year old female is on warfarin with supratherapeutic INR result. (Goal INR 2.0-3.0)    Recent labs: (last 7 days)     08/11/22  1325   INR 3.3*       ASSESSMENT       Source(s): Chart Review and Patient/Caregiver Call       Warfarin doses taken: Warfarin taken as instructed    Diet: No new diet changes identified    New illness, injury, or hospitalization: No    Medication/supplement changes: None noted    Signs or symptoms of bleeding or clotting: No    Previous INR: Therapeutic last visit; previously outside of goal range    Additional findings: patient would like to stay at this dose and eat an extra serving of greens. Going out of town for a wedding and is unable to recheck until 3 weeks.       PLAN     Recommended plan for no diet, medication or health factor changes affecting INR     Dosing Instructions: Continue your current warfarin dose with next INR in 3 weeks       Summary  As of 8/11/2022    Full warfarin instructions:  2.5 mg every Wed; 5 mg all other days   Next INR check:  9/1/2022             Telephone call with Shira who verbalizes understanding and agrees to plan and who agrees to plan and repeated back plan correctly    Lab visit scheduled    Education provided: Goal range and significance of current result, Monitoring for bleeding signs and symptoms and Contact [phone] with any changes, questions or concerns.     Plan made per [facility] anticoagulation protocol    YUNIER DAVIDSON RN  Anticoagulation Clinic  8/11/2022    _______________________________________________________________________     Anticoagulation Episode Summary     Current INR goal:  2.0-3.0   TTR:  74.0 % (1 y)   Target end date:  Indefinite   Send INR reminders to:  [facility]    Indications    Pulmonary emboli (H) [I26.99]  Long term current use of anticoagulant therapy [Z79.01]           Comments:           Anticoagulation Care Providers     Provider Role Specialty Phone  number    Anjel Busby MD [facility] Medicine [phone]

## 2022-08-15 ENCOUNTER — HOSPITAL ENCOUNTER (OUTPATIENT)
Dept: PHYSICAL THERAPY | Facility: CLINIC | Age: 62
Discharge: HOME | End: 2022-08-15
Payer: COMMERCIAL

## 2022-08-15 DIAGNOSIS — M62.81 GENERALIZED MUSCLE WEAKNESS: ICD-10-CM

## 2022-08-15 DIAGNOSIS — Z74.09 IMPAIRED FUNCTIONAL MOBILITY, BALANCE, GAIT, AND ENDURANCE: Primary | ICD-10-CM

## 2022-08-15 PROCEDURE — 97110 THERAPEUTIC EXERCISES: CPT | Mod: GP | Performed by: PHYSICAL THERAPIST

## 2022-08-17 DIAGNOSIS — I46.9 CARDIAC ARREST (H): ICD-10-CM

## 2022-08-22 ENCOUNTER — HOSPITAL ENCOUNTER (OUTPATIENT)
Dept: PHYSICAL THERAPY | Facility: CLINIC | Age: 62
Discharge: HOME | End: 2022-08-22
Payer: COMMERCIAL

## 2022-08-22 DIAGNOSIS — Z74.09 IMPAIRED FUNCTIONAL MOBILITY, BALANCE, GAIT, AND ENDURANCE: Primary | ICD-10-CM

## 2022-08-22 PROCEDURE — 97110 THERAPEUTIC EXERCISES: CPT | Mod: GP | Performed by: PHYSICAL THERAPIST

## 2022-08-22 NOTE — TELEPHONE ENCOUNTER
LISINOPRIL 5MG TABLETS      Last Written Prescription Date:  8/8/21  Last Fill Quantity: 90,   # refills: 3  Last Office Visit : 8/24/22  Future Office visit:  None scheduled    Routing refill request to provider for review/approval because:  Abnormal creatinine not a trend  Lab Test 05/24/22  0959 01/14/20  1039 01/14/20  0837   CR 1.76*   < >  --    CREAT  --   --  0.7

## 2022-08-24 RX ORDER — LISINOPRIL 5 MG/1
5 TABLET ORAL DAILY
Qty: 90 TABLET | Refills: 3 | Status: SHIPPED | OUTPATIENT
Start: 2022-08-24 | End: 2023-11-02

## 2022-09-01 ENCOUNTER — LAB (OUTPATIENT)
Dept: LAB | Facility: CLINIC | Age: 62
End: 2022-09-01
Payer: COMMERCIAL

## 2022-09-01 ENCOUNTER — ANTICOAGULATION THERAPY VISIT (OUTPATIENT)
Dept: ANTICOAGULATION | Facility: CLINIC | Age: 62
End: 2022-09-01

## 2022-09-01 DIAGNOSIS — Z79.01 LONG TERM CURRENT USE OF ANTICOAGULANT THERAPY: ICD-10-CM

## 2022-09-01 DIAGNOSIS — I26.99 PULMONARY EMBOLI (H): Primary | ICD-10-CM

## 2022-09-01 DIAGNOSIS — I26.99 PULMONARY EMBOLI (H): ICD-10-CM

## 2022-09-01 LAB — INR BLD: 2.4 (ref 0.9–1.1)

## 2022-09-01 PROCEDURE — 85610 PROTHROMBIN TIME: CPT

## 2022-09-01 PROCEDURE — 36416 COLLJ CAPILLARY BLOOD SPEC: CPT

## 2022-09-01 NOTE — PROGRESS NOTES
ANTICOAGULATION MANAGEMENT     Shira Rodriguez 62 year old female is on warfarin with therapeutic INR result. (Goal INR 2.0-3.0)    Recent labs: (last 7 days)     09/01/22  1310   INR 2.4*       ASSESSMENT       Source(s): Chart Review and Patient/Caregiver Call       Warfarin doses taken: Warfarin taken as instructed    Diet: No new diet changes identified    New illness, injury, or hospitalization: No    Medication/supplement changes: None noted    Signs or symptoms of bleeding or clotting: No    Previous INR: Supratherapeutic    Additional findings: None       PLAN     Recommended plan for no diet, medication or health factor changes affecting INR     Dosing Instructions: Continue your current warfarin dose with next INR in 3 weeks       Summary  As of 9/1/2022    Full warfarin instructions:  2.5 mg every Wed; 5 mg all other days   Next INR check:  9/22/2022             Telephone call with Shira who verbalizes understanding and agrees to plan and who agrees to plan and repeated back plan correctly    Lab visit scheduled    Education provided: Goal range and significance of current result and Importance of therapeutic range    Plan made per [facility] anticoagulation protocol    Mariah Sellers RN  Anticoagulation Clinic  9/1/2022    _______________________________________________________________________     Anticoagulation Episode Summary     Current INR goal:  2.0-3.0   TTR:  72.1 % (1 y)   Target end date:  Indefinite   Send INR reminders to:  [facility]    Indications    Pulmonary emboli (H) [I26.99]  Long term current use of anticoagulant therapy [Z79.01]           Comments:           Anticoagulation Care Providers     Provider Role Specialty Phone number    Anjel Busby MD Referring Family Medicine [phone]

## 2022-09-19 ENCOUNTER — HOSPITAL ENCOUNTER (OUTPATIENT)
Dept: PHYSICAL THERAPY | Facility: CLINIC | Age: 62
Discharge: HOME | End: 2022-09-19
Payer: COMMERCIAL

## 2022-09-19 DIAGNOSIS — M62.81 GENERALIZED MUSCLE WEAKNESS: Primary | ICD-10-CM

## 2022-09-19 DIAGNOSIS — R68.89 DECREASED ACTIVITY TOLERANCE: ICD-10-CM

## 2022-09-19 PROCEDURE — 97110 THERAPEUTIC EXERCISES: CPT | Mod: GP | Performed by: PHYSICAL THERAPIST

## 2022-09-19 PROCEDURE — 97112 NEUROMUSCULAR REEDUCATION: CPT | Mod: GP | Performed by: PHYSICAL THERAPIST

## 2022-09-22 ENCOUNTER — ANTICOAGULATION THERAPY VISIT (OUTPATIENT)
Dept: ANTICOAGULATION | Facility: CLINIC | Age: 62
End: 2022-09-22

## 2022-09-22 ENCOUNTER — LAB (OUTPATIENT)
Dept: LAB | Facility: CLINIC | Age: 62
End: 2022-09-22
Payer: COMMERCIAL

## 2022-09-22 DIAGNOSIS — I26.99 PULMONARY EMBOLI (H): ICD-10-CM

## 2022-09-22 DIAGNOSIS — I26.99 PULMONARY EMBOLI (H): Primary | ICD-10-CM

## 2022-09-22 DIAGNOSIS — Z79.01 LONG TERM CURRENT USE OF ANTICOAGULANT THERAPY: ICD-10-CM

## 2022-09-22 LAB — INR BLD: 2 (ref 0.9–1.1)

## 2022-09-22 PROCEDURE — 85610 PROTHROMBIN TIME: CPT

## 2022-09-22 PROCEDURE — 36415 COLL VENOUS BLD VENIPUNCTURE: CPT

## 2022-09-22 NOTE — PROGRESS NOTES
ANTICOAGULATION MANAGEMENT     Shira Rodriguez 62 year old female is on warfarin with therapeutic INR result. (Goal INR 2.0-3.0)    Recent labs: (last 7 days)     09/22/22  1131   INR 2.0*       ASSESSMENT       Source(s): Chart Review       Warfarin doses taken: Warfarin taken as instructed    Diet: No new diet changes identified    New illness, injury, or hospitalization: No    Medication/supplement changes: None noted    Signs or symptoms of bleeding or clotting: No    Previous INR: Therapeutic last 2(+) visits    Additional findings: None       PLAN     Recommended plan for no diet, medication or health factor changes affecting INR     Dosing Instructions: Continue your current warfarin dose with next INR in 3 weeks       Summary  As of 9/22/2022    Full warfarin instructions:  2.5 mg every Wed; 5 mg all other days   Next INR check:  10/13/2022             Detailed voice message left for Shira with dosing instructions and follow up date.     Contact [phone] to schedule and with any changes, questions or concerns.     Education provided: Please call back if any changes to your diet, medications or how you've been taking warfarin and Contact [phone] with any changes, questions or concerns.     Plan made per [facility] anticoagulation protocol    Mariah Sellers RN  Anticoagulation Clinic  9/22/2022    _______________________________________________________________________     Anticoagulation Episode Summary     Current INR goal:  2.0-3.0   TTR:  72.0 % (1 y)   Target end date:  Indefinite   Send [facility]R reminders to:  [facility]    Indications    Pulmonary emboli (H) [I26.99]  Long term current use of anticoagulant therapy [Z79.01]           Comments:           Anticoagulation Care Providers     Provider Role Specialty Phone number    Anjel Busby MD Referring Family Medicine [phone]

## 2022-09-26 ENCOUNTER — HOSPITAL ENCOUNTER (OUTPATIENT)
Dept: PHYSICAL THERAPY | Facility: CLINIC | Age: 62
Discharge: HOME | End: 2022-09-26
Payer: COMMERCIAL

## 2022-09-26 DIAGNOSIS — R68.89 DECREASED ACTIVITY TOLERANCE: ICD-10-CM

## 2022-09-26 DIAGNOSIS — M62.81 GENERALIZED MUSCLE WEAKNESS: Primary | ICD-10-CM

## 2022-09-26 DIAGNOSIS — Z74.09 IMPAIRED FUNCTIONAL MOBILITY, BALANCE, GAIT, AND ENDURANCE: ICD-10-CM

## 2022-09-26 PROCEDURE — 97110 THERAPEUTIC EXERCISES: CPT | Mod: GP | Performed by: PHYSICAL THERAPIST

## 2022-09-26 NOTE — PROGRESS NOTES
[facility]    OUTPATIENT PHYSICAL THERAPY  PLAN OF TREATMENT FOR OUTPATIENT REHABILITATION AND PROGRESS NOTE           Patient's Last Name, First Name, Shira Peter Date of Birth  1960   Provider's Name  [facility] Medical Record No.  [id number]    Onset Date  6/23/22 Start of Care Date  6/1/22   Type:     _X_PT   ___OT   ___SLP Medical Diagnosis  Generalized muscle weakness   PT Diagnosis  Impaired gait and balance Plan of Treatment  Frequency/Duration: 1x/wk  Certification date from 9/22/22 to 11/30/22     Goals:  Goal Identifier TUG   Goal Description Patient to improve on TUG to <12 seconds with no AD to display improve safety navigating home environment and decreased falls risk.   Target Date 09/21/22   Date Met  09/26/22   Progress (detail required for progress note): MET - Improved from 14.08seconds to 10.31 seconds.     Goal Identifier Gait Speed   Goal Description Patient to improve gait speed to >0.8 m/s in order to display improved safety with home and community ambulation.   Target Date 09/21/22   Date Met  09/26/22   Progress (detail required for progress note): MET - Pt improved from  0.69 m/s t o 0.81 m/s without AD.     Goal Identifier Functional LE Strength  - 30 STS   Goal Description Patient to improve with 30 second sit to stand to 9 repetitions to display improved LE endurance for greater tolerance and safety with completion of transfers from chairs, bed and in/out of cars.   Target Date 11/30/22   Date Met      Progress (detail required for progress note): PROGRESSING: Pt improved from 5 rep to 8 reps.     Goal Identifier HEP   Goal Description Patient to independently complete regular exercise program with correct mechanics in order to safely manage impairments upon discharge from skilled therapy.   Target Date 11/30/22   Date  "Met      Progress (detail required for progress note):           Beginning/End Dates of Progress Note Reporting Period:  6/23/22 to 9/26/2022    Progress Toward Goals:   Progress this reporting period: Pt has progressing in gait speed and balance as she met both of these goals and is now where she was upon discharge from her last PT bout of care. Her strength and activity tolerance continue to be impaired and areas of focus for PT.    Client Self (Subjective) Report for Progress Note Reporting Period: Pt notes fatigue and back/knee soreness.    Objective Measurements:   Objective Measure: TUG  Details: 10.31 seconds  Objective Measure: Gait speed  Details: 0.81 m/s  Objective Measure: 30\" Chair Stand  Details: 8 reps            I CERTIFY THE NEED FOR THESE SERVICES FURNISHED UNDER        THIS PLAN OF TREATMENT AND WHILE UNDER MY CARE     (Physician co-signature of this document indicates review and certification of the therapy plan).                Referring Provider: Anjel Busby MD Jamie Engebretson, PT    "

## 2022-09-28 ENCOUNTER — TELEPHONE (OUTPATIENT)
Dept: PULMONOLOGY | Facility: CLINIC | Age: 62
End: 2022-09-28

## 2022-09-28 NOTE — TELEPHONE ENCOUNTER
Spoke with pt regarding moving up appt with Dr. Pizano from December to sooner as she requested to be placed on waitlist. Discussed opening with Dr. Pizano on 10/12 but discussed that [facility] is not available that morning of her appointment, but we could also complete this at [facility] a few days prior. Pt is agreeable to plan and FPFT and appt scheduled in October. Details confirmed with pt and appts in December cancelled.

## 2022-09-29 ENCOUNTER — OFFICE VISIT (OUTPATIENT)
Dept: RHEUMATOLOGY | Facility: CLINIC | Age: 62
End: 2022-09-29
Attending: FAMILY MEDICINE
Payer: COMMERCIAL

## 2022-09-29 VITALS
OXYGEN SATURATION: 95 % | WEIGHT: 292.5 LBS | HEIGHT: 66 IN | HEART RATE: 100 BPM | BODY MASS INDEX: 47.01 KG/M2 | SYSTOLIC BLOOD PRESSURE: 113 MMHG | DIASTOLIC BLOOD PRESSURE: 77 MMHG

## 2022-09-29 DIAGNOSIS — M19.90 INFLAMMATORY ARTHRITIS: ICD-10-CM

## 2022-09-29 PROCEDURE — 99214 OFFICE O/P EST MOD 30 MIN: CPT | Performed by: INTERNAL MEDICINE

## 2022-09-29 ASSESSMENT — PAIN SCALES - GENERAL: PAINLEVEL: MODERATE PAIN (5)

## 2022-09-29 NOTE — PROGRESS NOTES
"[facility]  Rheumatology Clinic  Everett Austin MD  2022     Name: Shira Rodriguez  MRN: [id number]  Age: 62 year old  : 1960  Referring provider: Anejl Busby     Assessment and Plan:  # Inflammatory Arthritis (+RF/+CCP, dx 2017 with diffuse polyarticular inflammatory flare):   Ms. Rodriguez has no complaints relating to the joints that were formerly affected by inflammatory arthritis.  Minimal morning stiffness.  Examination today shows no altered range of motion or synovitis in upper extremity joints. Blood work in May 2022 showed creatinine of 1.76; transaminases albumin were normal as was TSH.  CBC was normal.    Seropositive inflammatory arthritis is in remission, despite patient report of no immunomodulatory medication use since early .  We discussed how deep and long-lasting remissions are possible with use of medication such as methotrexate, and furthermore the status of inflammatory arthritis is a relapsing and remitting condition.  The positive serologies noted in 2017 do confer increased risk of progressive damaging joint disease.  However I note that titers of both rheumatoid factor and cyclic citrullinated peptide antibodies at that time were low.  I recommend rechecking the titers, on the supposition that infection may have played a role in the inflammatory arthritis and the serologies 5 years ago.  If titers are increased since 2017, increased vigilance regarding recurrence of inflammatory arthritis will be needed.  However, for now, I recommend a stance of \"watchful waiting\" with no immunomodulatory medication.    # Knee OA  # Severe obesity  # Elevated creatinine:  Discuss with Dr. Busby    Plan:  1.  Blood work to repeat rheumatoid factor and cyclic citrullinated peptide antibodies at the next INR at your convenience.  2.  Remain off methotrexate, but maintain careful watching for recurrence of small joint pain, swelling, or stiffness that lasts for more than a couple " of weeks.  Alert rheumatology if that happens.  3.  Continue to work on weight management and increasing aerobic exercise for knee health.    RTC 6 mos    Everett Austin M.D.  Staff Rheumatologist,  Health  Pager [phone]      HPI:   Shira Rodriguez has a history of rheumatoid arthritis who presents for follow up. I last saw the patient 5-2021. Seropositive rheumatoid arthritis was well controlled at that time on 25 mg of subcutaneous Methotrexate. She was also encouraged to continue leucovorin 5 mg to relieve any mucosal irritation related to Methotrexate.    Interval history September 29, 2022    She stopped methotrexate 6 months ago because she could not get syringes.  She reports the happy circumstance that former wrist hand and foot swelling stiffness and pain has not recurred despite being off the methotrexate for 6 months.  Morning stiffness is less than 10 minutes.  She has knee pain that is made worse with walking on uneven ground or when navigating stairs.  But no pain or swelling visible in knees or other joints.  She has not required prednisone or regular use of nonsteroidals since last visit.    Interval history 05-:    Overall joints are doing well.  She has some soreness with flexion of the second and third DIPs in the right hand.  Otherwise there is no swelling, stiffness, redness, or restriction in motion of hand and finger joints.  Knees bother her with prolonged walking, but she looks forward to returning to the pool after the pandemic for aqua exercise.  She believes that the methotrexate has been associated with greater than 80% improvement in joint pain and other symptoms.    Unrelenting fatigue continues.  She notes a new source of exhaustion and that her mother recently had a stroke and the patient is is sharing personal care duties with a sister.    She is remained on methotrexate through the COVID-19 pandemic.  No further oral ulcerations.  She has not been using leucovorin.   She has used folate 3 mg daily.    Prior history  2-2020    Today, the patient reports that she has been doing well since her last visit. She states that her oral lesions resolved with  leucovorin and folic acid. She also notes that her joint pain and decreased range of motion is limited to her bilateral second digits in her hands. Her left second finger is more stiff than her right, and she denies locking or ratcheting in either finger. She notes that her morning stiffness typically resolves in 15 to 30 minutes. She has no significant swelling in her feet. She notes that she has very little swelling in her legs after starting lasix. She denies rash, shortness of breath, or raynaud's flares.     The patient states that she had a cough prior to her pulmonary embolism/myocardial infarction. She then developed pneumonia while intubated and required a thoracentesis while in the hospital. She reports that she had a persistent cough after this procedure and repeat ultrasound noted further fluid for which she had another thoracentesis which produced 600 ml of fluid. She notes that her cough is improved after her second thoracentesis but is still present. She states her pulmonologist is concerned her remaining cough could be related to her Methotrexate. She is set up for a repeat PFTs and Chest Xray in the coming months.      Rheumatological history:  Referrred 2/2017 for 3 months of BL hand pain thought consistent with CTS and positive RF/CCP. EMG showed moderate median neuropathy on L and severe on R, MRI c-spine normal. She received R CT injection by sports med with 9 months of relief. She did describe intermittent episodes of swelling of her feet/ankles around this time period as well that resolved with medrol dose pack. She was re-evaluated 8/2017 after developing polyarticular inflammatory arthritis of the right knee, ankles, wrists, hands, and shoulders. She was started on MTX, given depomedrol IM injection, oral  prednisone, and given R knee CS injection. MTX increased and oral prednisone tapered to off in 9/2017. Had repeat R CT injection in 10/2017 that lasted until 1/2018. R knee significantly improved following CS injection 12/2017. Has persistent pain in L heel with enthesopathy on imaging, unclear if had true enthesitis in this region due to her RA. Participated in pool therapy winter 2018 with good response. At her 3/2018 OV she continued to have significant pain in her R wrist thought due to CT, but given some ongoing persistent EMS (BL hand stiffness lasting ALL day) as well and her known seropositive disease I recommended adding Humira to her regimen. This was ordered but she never started. Methotrexate Rx continued through 2021, drug stopped as of early 2022.     Review of Systems:   Pertinent items are noted in HPI or as below, remainder of complete ROS is negative.    No fevers/chills/sweats  No recent problems with hearing or vision. No swallowing problems.   No breathing difficulty, shortness of breath, coughing, or wheezing  No chest pain or palpitations  No heart burn, indigestion, abdominal pain, nausea, vomiting, diarrhea  No urination problems, no bloody, cloudy urine, no dysuria  No numbing, tingling, weakness  No headaches or confusion  No rashes. No easy bleeding or bruising.       Active Medications:   Current Outpatient Medications   Medication Sig     acetaminophen (TYLENOL) 325 MG tablet Take 2 tablets (650 mg) by mouth every 6 hours as needed for mild pain or fever     albuterol (PROAIR HFA/PROVENTIL HFA/VENTOLIN HFA) 108 (90 Base) MCG/ACT inhaler Inhale 2 puffs into the lungs every 6 hours as needed for shortness of breath / dyspnea or wheezing     ARIPiprazole (ABILIFY) 10 MG tablet Take 1 tablet (10 mg) by mouth daily     DULoxetine (CYMBALTA) 60 MG capsule Take 1 capsule (60 mg) by mouth daily     lisinopril (ZESTRIL) 5 MG tablet Take 1 tablet (5 mg) by mouth daily     VITAMIN D3 25 MCG (1000  "UT) tablet TAKE 2 TABLETS BY MOUTH EVERY DAY     warfarin ANTICOAGULANT (COUMADIN) 5 MG tablet TAKE 1 TABLET BY MOUTH DAILY OR AS DIRECTED BY ANTICOAGULATION CLINIC     amoxicillin-clavulanate (AUGMENTIN) 875-125 MG tablet Take 1 tablet by mouth 2 times daily     atorvastatin (LIPITOR) 40 MG tablet Take 1 tablet (40 mg) by mouth daily (Patient not taking: No sig reported)     folic acid (FOLVITE) 1 MG tablet Take 3-4 tablets (3-4 mg) by mouth daily (Patient not taking: Reported on 9/29/2022)     insulin syringe-needle U-100 (BD INSULIN SYRINGE ULTRAFINE) 30G X 1/2\" 1 ML miscellaneous For Methotrexate use weekly. (Patient not taking: Reported on 9/29/2022)     methotrexate 50 MG/2ML injection Inject 1 mL (25 mg) Subcutaneous every 7 days (Patient not taking: Reported on 9/29/2022)     No current facility-administered medications for this visit.     Facility-Administered Medications Ordered in Other Visits   Medication     sodium chloride (PF) 0.9% PF flush 10 mL     Stopped lipitor       Allergies:   Adhesive tape and Erythromycin      Past Medical History:  Depression   Eczema   Hyperlipidemia   Hypertension   Morbid obesity   Osteoarthrosis right knee   Sleep apnea   Tobacco use disorder   Primary localized osteoarthrosis, pelvic region and thigh  Degeneration of cervical intervertebral disc  Chronic bronchitis   Pulmonary embolism   Cardiac arrest; required ECMO 9-2019     Past Surgical History:  Left total knee arthoplasty 6/14/2012   Right hip arthroplasty 07/09/2004      Family History:   Mother: thyroid disease, hypertension, skin cancer, CVA  Paternal grandmother: breast cancer, pancreatic cancer   Father: alcoholism   Sister: thyroid disease, alcoholism   Maternal grandmother: hypertension   Sister: heart disease, multiple ablations   Paternal grandfather: prostate cancer      Social History:   Current everyday cigarette smoker, 1 pack/day, 33 years, started 1982  No smokeless tobacco use  Occasional alcohol " "use   Physical Exam:   /77 (BP Location: Left arm, Patient Position: Sitting, Cuff Size: Adult Large)   Pulse 100   Ht 1.676 m (5' 6\")   Wt 132.7 kg (292 lb 8 oz)   SpO2 95%   BMI 47.21 kg/m     Wt Readings from Last 4 Encounters:   09/29/22 132.7 kg (292 lb 8 oz)   06/29/22 132 kg (291 lb)   06/20/22 132.3 kg (291 lb 11.2 oz)   05/24/22 133.6 kg (294 lb 9.6 oz)     Constitutional: Well-developed, appearing stated age; cooperative  Eyes: Normal EOM, PERRLA, vision, conjunctiva, sclera  ENT: Normal external ears, nose, hearing, lips, teeth, gums, throat. No mucous membrane lesions, normal saliva pool  Neck: No mass or thyroid enlargement  MS: Excellent alignment of MCPs and PIPs. No appearance of dactylitis  Skin: No nail pitting, alopecia, nodules or lesions.    Neuro: Normal cranial nerves  Psych: Normal judgement, orientation, memory, affect.     Laboratory:   RHEUM RESULTS Latest Ref Rng & Units 2/10/2004 7/10/2004 7/11/2004   ALBUMIN 3.4 - 5.0 g/dL - - -   ALT 0 - 50 U/L - - -   AST 0 - 45 U/L - - -   CK TOTAL 30 - 225 U/L - - -   CREATININE 0.52 - 1.04 mg/dL 0.64 - -   CRP 0.0 - 8.0 mg/L - - -   ILENE <1.0 - - -   GFR ESTIMATE, IF BLACK >60 mL/min/[1.73:m2] >80 - -   GFR ESTIMATE >60 mL/min/1.73m2 >80 - -   HEMATOCRIT 35.0 - 47.0 % 46.4 - -   HEMOGLOBIN 11.7 - 15.7 g/dL 15.2 11.6(L) 11.2(L)   HEPBANG NR - - -   HCVAB NR - - -   IGA 70 - 380 mg/dL - - -   IGM 60 - 265 mg/dL - - -    - 1,620 mg/dL - - -   WBC 4.0 - 11.0 10e3/uL 10.8 - -   RBC 3.80 - 5.20 10e6/uL 5.24(H) - -   RDW 10.0 - 15.0 % 12.4 - -   MCHC 31.5 - 36.5 g/dL 32.7 - -   MCV 78 - 100 fL 89 - -   PLATELET COUNT 150 - 450 10e3/uL 335 - -   RHEUMATOID FACTOR <20 IU/mL - - -   ESR 0 - 30 mm/h - - -       Rheumatoid Factor   Date Value Ref Range Status   08/28/2017 51 (H) <20 IU/mL Final     Cyclic Citrullinated Peptide Antibody, IgG   Date Value Ref Range Status   08/28/2017 15 (H) <7 U/mL Final     Comment:     Positive     EMERY " "Screen by EIA   Date Value Ref Range Status   12/30/2016 <1.0  Interpretation:  Negative   <1.0 Final     Hepatitis B Core Caron   Date Value Ref Range Status   04/12/2017 Nonreactive NR Final     Hep B Surface Agn   Date Value Ref Range Status   04/12/2017 Nonreactive NR Final     IGG   Date Value Ref Range Status   04/21/2017 940 695 - 1,620 mg/dL Final     IGA   Date Value Ref Range Status   04/21/2017 271 70 - 380 mg/dL Final     IGM   Date Value Ref Range Status   04/21/2017 395 (H) 60 - 265 mg/dL Final     Chief Complaint   Patient presents with     RECHECK     Inflammatory arthritis     Blood pressure 113/77, pulse 100, height 1.676 m (5' 6\"), weight 132.7 kg (292 lb 8 oz), SpO2 95 %, not currently breastfeeding.    "

## 2022-09-29 NOTE — PATIENT INSTRUCTIONS
Diagnosis:  1.  Inflammatory arthritis: Control of joint pain that formerly disrupted activities of daily living remains markedly improved despite stopping methotrexate. I recommend watching and waiting off medication for the moment.    Plan:  1.  Blood work to repeat rheumatoid factor and cyclic citrullinated peptide antibodies at the next INR at your convenience.  2.  Remain off methotrexate, but maintain careful watching for recurrence of small joint pain, swelling, or stiffness that lasts for more than a couple of weeks.  Alert rheumatology if that happens.  3.  Continue to work on weight management and increasing aerobic exercise for knee health.

## 2022-10-10 ENCOUNTER — ALLIED HEALTH/NURSE VISIT (OUTPATIENT)
Dept: PULMONOLOGY | Facility: CLINIC | Age: 62
End: 2022-10-10
Payer: COMMERCIAL

## 2022-10-10 DIAGNOSIS — R05.3 CHRONIC COUGH: ICD-10-CM

## 2022-10-10 PROCEDURE — 94727 GAS DIL/WSHOT DETER LNG VOL: CPT | Performed by: INTERNAL MEDICINE

## 2022-10-10 PROCEDURE — 94375 RESPIRATORY FLOW VOLUME LOOP: CPT | Performed by: INTERNAL MEDICINE

## 2022-10-10 PROCEDURE — 94729 DIFFUSING CAPACITY: CPT | Performed by: INTERNAL MEDICINE

## 2022-10-11 LAB
DLCOUNC-%PRED-PRE: 82 %
DLCOUNC-PRE: 17.69 ML/MIN/MMHG
DLCOUNC-PRED: 21.54 ML/MIN/MMHG
ERV-%PRED-PRE: 425 %
ERV-PRE: 0.73 L
ERV-PRED: 0.17 L
EXPTIME-PRE: 6.06 SEC
FEF2575-%PRED-PRE: 96 %
FEF2575-PRE: 2.25 L/SEC
FEF2575-PRED: 2.33 L/SEC
FEFMAX-%PRED-PRE: 78 %
FEFMAX-PRE: 5.13 L/SEC
FEFMAX-PRED: 6.55 L/SEC
FEV1-%PRED-PRE: 82 %
FEV1-PRE: 2.17 L
FEV1FEV6-PRE: 82 %
FEV1FEV6-PRED: 81 %
FEV1FVC-PRE: 82 %
FEV1FVC-PRED: 79 %
FEV1SVC-PRE: 85 %
FEV1SVC-PRED: 75 %
FIFMAX-PRE: 3.26 L/SEC
FRCPLETH-%PRED-PRE: 74 %
FRCPLETH-PRE: 2.1 L
FRCPLETH-PRED: 2.82 L
FVC-%PRED-PRE: 78 %
FVC-PRE: 2.64 L
FVC-PRED: 3.36 L
IC-%PRED-PRE: 54 %
IC-PRE: 1.81 L
IC-PRED: 3.33 L
RVPLETH-%PRED-PRE: 67 %
RVPLETH-PRE: 1.37 L
RVPLETH-PRED: 2.01 L
TLCPLETH-%PRED-PRE: 74 %
TLCPLETH-PRE: 3.91 L
TLCPLETH-PRED: 5.27 L
VA-%PRED-PRE: 65 %
VA-PRE: 3.39 L
VC-%PRED-PRE: 72 %
VC-PRE: 2.54 L
VC-PRED: 3.5 L

## 2022-10-12 ENCOUNTER — OFFICE VISIT (OUTPATIENT)
Dept: PULMONOLOGY | Facility: CLINIC | Age: 62
End: 2022-10-12
Attending: INTERNAL MEDICINE
Payer: COMMERCIAL

## 2022-10-12 ENCOUNTER — TELEPHONE (OUTPATIENT)
Dept: PULMONOLOGY | Facility: CLINIC | Age: 62
End: 2022-10-12

## 2022-10-12 VITALS — DIASTOLIC BLOOD PRESSURE: 74 MMHG | OXYGEN SATURATION: 96 % | SYSTOLIC BLOOD PRESSURE: 116 MMHG | HEART RATE: 97 BPM

## 2022-10-12 DIAGNOSIS — J90 PLEURAL EFFUSION: ICD-10-CM

## 2022-10-12 DIAGNOSIS — R05.9 COUGH, UNSPECIFIED TYPE: ICD-10-CM

## 2022-10-12 DIAGNOSIS — K21.00 GASTROESOPHAGEAL REFLUX DISEASE WITH ESOPHAGITIS WITHOUT HEMORRHAGE: ICD-10-CM

## 2022-10-12 DIAGNOSIS — Z23 NEED FOR VACCINATION: Primary | ICD-10-CM

## 2022-10-12 PROCEDURE — 99214 OFFICE O/P EST MOD 30 MIN: CPT | Performed by: INTERNAL MEDICINE

## 2022-10-12 PROCEDURE — G0008 ADMIN INFLUENZA VIRUS VAC: HCPCS | Performed by: INTERNAL MEDICINE

## 2022-10-12 PROCEDURE — 250N000011 HC RX IP 250 OP 636: Performed by: INTERNAL MEDICINE

## 2022-10-12 PROCEDURE — G0463 HOSPITAL OUTPT CLINIC VISIT: HCPCS | Mod: 25

## 2022-10-12 PROCEDURE — 90682 RIV4 VACC RECOMBINANT DNA IM: CPT | Performed by: INTERNAL MEDICINE

## 2022-10-12 RX ADMIN — INFLUENZA A VIRUS A/WISCONSIN/588/2019 (H1N1) RECOMBINANT HEMAGGLUTININ ANTIGEN, INFLUENZA A VIRUS A/DARWIN/6/2021 (H3N2) RECOMBINANT HEMAGGLUTININ ANTIGEN, INFLUENZA B VIRUS B/AUSTRIA/1359417/2021 RECOMBINANT HEMAGGLUTININ ANTIGEN, AND INFLUENZA B VIRUS B/PHUKET/3073/2013 RECOMBINANT HEMAGGLUTININ ANTIGEN 0.5 ML: 45; 45; 45; 45 INJECTION INTRAMUSCULAR at 10:58

## 2022-10-12 NOTE — NURSING NOTE
Chief Complaint   Patient presents with     Follow Up     Follow up      Vitals were taken and medications were reconciled.     Rose Mata RMA  10:38 AM

## 2022-10-12 NOTE — LETTER
10/12/2022         RE: Shira Rodriguez  [id number]  [place] [address]        Dear Colleague,    Thank you for referring your patient, Shira Rodriguez, to the [facility] [place]. Please see a copy of my visit note below.    [facility]  October 12, 2022         Assessment and Plan:   Shira Rodriguez is a 63 yo F with medical history significant for chronic bronchitis, rheumatoid arthritis, obstructive sleep apnea, prior tobacco dependence who is in the pulmonary clinic for follow-up of chronic cough.      Etiology of chronic cough is unknown at this time and may be secondary to chronic bronchitis given smoking history, GERD.  Recent chest CT with ill-defined groundglass opacities and interlobular septal thickening as well as dilated pulmonary artery.  PFTs suggest early reactive airway disease we will therefore trial an albuterol inhaler.    1.  Chronic productive cough  2.  Prior tobacco dependence  3.  CT with ill-defined GGO and interlobular septal thickening, dilated PA -GGO has resolved on follow-up but she continues to have a chronic left pleural effusion  4.  Rheumatoid arthritis, previously on methotrexate approximately 4 years  5. H/o blood clots (2019)      Recommendations as follows:  -Trial albuterol inhaler for cough; use aerobika for airway clearance  -She completed Augmentin for 2 weeks at last visit with improvement in her cough  -Ultrasound-guided thoracentesis to evaluate left-sided pleural effusion  -VQ scan to evaluate etiology of dilated PA -negative and TTE 6/2/22 showed concentric LVH but otherwise wnl   -Trial of Prilosec for 1 month  - try to increase regular activity with a goal of at least 3 sessions/week of 30 minutes of cardiovascular exercise; start slow and work up to this goal  -Stay up-to-date with yearly influenza vaccines, pneumonia  "series (prevnar 13 and pneumovax), and Covid shots    Return to clinic in approximately 4 months.     Indira Pizano MD  Pulmonary, Allergy, Critical Care, and Sleep Medicine   Pager: [phone]    This note was created using dictation software and may contain errors.  Please contact the creator for any clarifications that are needed.    I have spent 30 minutes on the day of the visit to review the chart, interview and examine the patient, review labs and imaging, formulate a plan, document and submit orders. Time documented is excluding time spent for PFT interpretation        HPI:    Shira Rodriguez is a 63 yo F with medical history significant for chronic bronchitis, rheumatoid arthritis, obstructive sleep apnea, prior tobacco dependence who is in the pulmonary clinic for follow-up of chronic cough.  She has previously seen Dr. Richardson, last in 2020 and was seen by myself in June 2022.     She is currently doing well.  She did note that her cough improved with the antibiotic at her last visit but now has come back and is persistent.  Occurring every single day and is productive of white, at times thick mucus.  She did use aerobic but is not sure if it helped so she stopped using this.  The cough is worse when she lays down at nighttime.  She never trialed albuterol for the cough and is not using that either.  She denies GERD, allergy symptoms as well as PND.     She is fairly inactive and limited by low back pain.  She is currently doing PT for her back pain.  She reports ongoing fatigue.  She continues to not smoke and quit in 2019.        Prior:     She has had a cough for greater than 5 years.  This was initially attributed to smoking but she has not smoked for 3 years and it continues to be productive of yellow/white mucus.  She does feel like there are \"plugs\" that come out.  The cough is worse at night despite sleeping at 45 degree angle chronically.  She denies hemoptysis.    Cough is worse in regards to " frequency.  There are no associations with food, no aspiration, no acid reflux or postnasal drainage.  Also worse when she first wakes up for which she has to clear a lot of mucus initially.  She denies dental issues, recurrent infections.  She does state that her voice does get hoarse intermittently when she is mobilizing mucus but clears with coughing.    She was previously on methotrexate (3 to 4 years) but she stopped taking this about a month ago for her RA and she ran out.  She has not noticed any changes in her rheumatoid symptoms which are predominantly in her hands.  Symptoms have always been quite mild and her diagnosis was predominantly from blood work.  She does have an upcoming appointment with her rheumatologist in the next month.    She is not currently on inhalers but used albuterol many years ago.  She denies occupational or home exposures to mold, pets, asbestos, tuberculosis.           Review of Systems:     A 13 point ROS was performed and was negative except as listed above in the HPI.           Past Medical and Surgical History:     Past Medical History:   Diagnosis Date     Chronic bronchitis (H) 07/30/2015     Contact dermatitis      Depressive disorder 1985     Eczema     HANDS     Elevated liver enzymes      H/O total knee replacement, left      History of total hip replacement 10/27/2011     Hyperlipidemia      Hypertension      Morbid obesity (H)      Osteoarthrosis     right knee     RA (rheumatoid arthritis) (H) 8/2018     Sleep apnea      Tobacco use disorder      Varicella 1965     Past Surgical History:   Procedure Laterality Date     ARTHROPLASTY KNEE  6/14/2012    Procedure: ARTHROPLASTY KNEE;  Left Total Knee Arthroplasty;  Surgeon: Annette Quesada MD;  Location: [facility] OR     ARTHROSCOPY HIP Right      BRONCHOSCOPY FLEXIBLE AND RIGID N/A 9/17/2019    Procedure: Bronchoscopy flexible;  Surgeon: Patrick Rayo MD;  Location: [facility] OR     COLONOSCOPY N/A 7/16/2021     Procedure: COLONOSCOPY, WITH POLYPECTOMY AND BIOPSY;  Surgeon: Diogo Lynch MD;  Location: [facility] GI     CV EXTRACORPERAL MEMBRANE OXYGENATION N/A 2019    Procedure: Extracorporeal Membrance Oxygenation;  Surgeon: Kaleb Mcfarlane MD;  Location:  [facility]ATH LAB     INSERT EXTRACORPORAL MEMBRANE OXYGENATOR N/A 2019    Procedure: Venous-Venous cannulation using ultrasound guidance and transesophageal echocardiogram, venous-arterial ecmo decannulation and repair of left femoral artery;  Surgeon: Patrick Rayo MD;  Location: [facility] OR     IR MECH THROMB PRIM ART, NON-INTRACRNL  9/10/2019     IR PULMONARY ANGIOGRAM BILATERAL  9/10/2019     IR THROMBOLYSIS ARTERIAL INFUSION INITIAL DAY  9/10/2019     THORACENTESIS N/A 2020    Procedure: THORACENTESIS;  Surgeon: Georgina Richardson MD;  Location:  GI     ZZC TOTAL HIP ARTHROPLASTY  04    RT           Family History:     Family History   Problem Relation Age of Onset     Thyroid Disease Mother      Hypertension Mother      Skin Cancer Mother         MMohs surgery with skin graft     Cerebrovascular Disease Mother         CVA after endarderectomy-      Diabetes Mother      Depression Mother      Alcoholism Father      Heart Disease Father      Substance Abuse Father              Heart Disease Sister         multiple ablations     Alcoholism Sister      Substance Abuse Sister         Sober     Depression Sister      Substance Abuse Sister         Sober 30 plus years     Depression Sister      Thyroid Disease Sister      Hypertension Maternal Grandmother      Depression Maternal Grandmother      Breast Cancer Paternal Grandmother      Pancreatic Cancer Paternal Grandmother      Prostate Cancer Paternal Grandfather      Cardiovascular Paternal Aunt      Cardiovascular Paternal Uncle      Depression Cousin      Depression Other             Social History:     Social History     Socioeconomic History     Marital  "status: Single     Spouse name: Not on file     Number of children: Not on file     Years of education: Not on file     Highest education level: Not on file   Occupational History     Occupation: registered nurse     Comment: [employer]   Tobacco Use     Smoking status: Former     Packs/day: 0.00     Years: 33.00     Pack years: 0.00     Types: Cigarettes     Start date: 1/1/1982     Quit date: 9/9/2019     Years since quitting: 3.0     Smokeless tobacco: Never   Vaping Use     Vaping Use: Never used   Substance and Sexual Activity     Alcohol use: Yes     Comment: Rarely     Drug use: No     Sexual activity: Not Currently     Birth control/protection: Abstinence, Post-menopausal   Other Topics Concern     Parent/sibling w/ CABG, MI or angioplasty before 65F 55M? Not Asked   Social History Narrative     Not on file     Social Determinants of Health     Financial Resource Strain: Not on file   Food Insecurity: Not on file   Transportation Needs: Not on file   Physical Activity: Not on file   Stress: Not on file   Social Connections: Not on file   Intimate Partner Violence: Not on file   Housing Stability: Not on file            Medications:     Current Outpatient Medications   Medication     acetaminophen (TYLENOL) 325 MG tablet     albuterol (PROAIR HFA/PROVENTIL HFA/VENTOLIN HFA) 108 (90 Base) MCG/ACT inhaler     ARIPiprazole (ABILIFY) 10 MG tablet     DULoxetine (CYMBALTA) 60 MG capsule     lisinopril (ZESTRIL) 5 MG tablet     VITAMIN D3 25 MCG (1000 UT) tablet     warfarin ANTICOAGULANT (COUMADIN) 5 MG tablet     atorvastatin (LIPITOR) 40 MG tablet     insulin syringe-needle U-100 (BD INSULIN SYRINGE ULTRAFINE) 30G X 1/2\" 1 ML miscellaneous     Current Facility-Administered Medications   Medication     influenza recomb quadrivalent PF (FLUBLOK) injection 0.5 mL     Facility-Administered Medications Ordered in Other Visits   Medication     sodium chloride (PF) 0.9% PF flush 10 mL            Physical Exam:   BP " 116/74 (BP Location: Right arm, Patient Position: Chair, Cuff Size: Adult Regular)   Pulse 97   SpO2 96%     Constitutional:   Awake, alert and in no apparent distress     Eyes:   nonicteric     HEENT:   Supple without supraclavicular or cervical lymphadenopathy     Lungs:   Good air flow.  No crackles. No rhonchi.  No wheezes.     Cardiovascular:   Normal S1 and S2.  RRR.  No murmur, gallop or rub.     Musculoskeletal:   No edema, no digital clubbing present     Neurologic:   Alert and conversant.     Skin:   Warm, dry.  No rash on limited exam.             Data:   All laboratory and imaging data reviewed personally.      Specimen Description   Date Value Ref Range Status   01/16/2020 Pleural fluid Left lower lobe  Final   01/16/2020 Pleural fluid Left lower lobe  Final   10/04/2019 Pleural fluid  Final   10/04/2019 Pleural fluid  Final    Culture Micro   Date Value Ref Range Status   01/16/2020 No growth  Final   10/04/2019 No growth  Final   09/20/2019 No growth  Final        Recent Results (from the past 168 hour(s))   General PFT Lab (Please always keep checked)    Collection Time: 10/10/22  9:46 AM   Result Value Ref Range    FVC-Pred 3.36 L    FVC-Pre 2.64 L    FVC-%Pred-Pre 78 %    FEV1-Pre 2.17 L    FEV1-%Pred-Pre 82 %    FEV1FVC-Pred 79 %    FEV1FVC-Pre 82 %    FEFMax-Pred 6.55 L/sec    FEFMax-Pre 5.13 L/sec    FEFMax-%Pred-Pre 78 %    FEF2575-Pred 2.33 L/sec    FEF2575-Pre 2.25 L/sec    KDY3749-%Pred-Pre 96 %    ExpTime-Pre 6.06 sec    FIFMax-Pre 3.26 L/sec    VC-Pred 3.50 L    VC-Pre 2.54 L    VC-%Pred-Pre 72 %    IC-Pred 3.33 L    IC-Pre 1.81 L    IC-%Pred-Pre 54 %    ERV-Pred 0.17 L    ERV-Pre 0.73 L    ERV-%Pred-Pre 425 %    FEV1FEV6-Pred 81 %    FEV1FEV6-Pre 82 %    FRCPleth-Pred 2.82 L    FRCPleth-Pre 2.10 L    FRCPleth-%Pred-Pre 74 %    RVPleth-Pred 2.01 L    RVPleth-Pre 1.37 L    RVPleth-%Pred-Pre 67 %    TLCPleth-Pred 5.27 L    TLCPleth-Pre 3.91 L    TLCPleth-%Pred-Pre 74 %    DLCOunc-Pred  21.54 ml/min/mmHg    DLCOunc-Pre 17.69 ml/min/mmHg    DLCOunc-%Pred-Pre 82 %    VA-Pre 3.39 L    VA-%Pred-Pre 65 %    FEV1SVC-Pred 75 %    FEV1SVC-Pre 85 %       PFT: 9/4/20 - Possible early reactive airway disease as evidence by reduced FEV1 and FEV with normal FEV1/FVC. TLC is wnl without evidence of air trapping or hyperinflation. Normal diffusion capacity.     CT chest- 5/24/2022-personally reviewed:  Small left-sided pleural effusion, mild bibasilar atelectasis.  Right lower lobe granuloma.  GGO in the posterior right lower lobe.  Dilated pulmonary artery.                                                                   IMPRESSION:      1. Stable small left pleural effusion.  2. Patchy areas of ill-defined groundglass opacity are increased  compared to prior, and likely represent areas of  infection/inflammation.  3. Unchanged dilation of the main pulmonary artery, which can be seen  in pulmonary hypertension.  4. Cholelithiasis without evidence for acute cholecystitis.  5. Stable 3.8 cm right adrenal adenoma.    CT chest-7/21/2022-personally reviewed and I agree with radiologist exam:    FINDINGS: No contrast. Thyroid appears grossly unremarkable. Mitral  annular and aortic annular calcifications. Thoracic aortic and  coronary artery calcifications. Small left pleural effusion grossly  unchanged. Small hiatal hernia. No adenopathy in the chest.  Dependent atelectasis in the right lung base. Atelectasis and mild  edema associated with the left pleural effusion. No dominant pulmonary  nodule. Mild air trapping on expiration. Prone imaging again shows the  subsegmental atelectasis and edema on the left to be not fixed. Bones  degenerative changes in the thoracic spine.                                                                      IMPRESSION: Atherosclerosis. Left pleural effusion with associated  atelectasis/mild edema.       Sincerely,    Indira Pizano MD

## 2022-10-12 NOTE — TELEPHONE ENCOUNTER
Two sputum cups, with associated labels and specimen bags, and instructions for providing sptum mailed to patient to mailing address on file, as directed by Dr. Pizano.

## 2022-10-12 NOTE — TELEPHONE ENCOUNTER
----- Message from Indira Pizano MD sent at 10/12/2022 11:20 AM CDT -----  Hello,     This patient left before getting her sputum cups. Can you ship an AFB and a regular respiratory sputum culture cup to patient? I informed her that she can place them in the freezer and then drop them off to any [facility] lab.     Thank you!    Smita Pizano MD  Pulmonary, Allergy, Critical Care, and Sleep Medicine   [facility]   Pager: [phone]

## 2022-10-12 NOTE — PROGRESS NOTES
[facility]  [facility]  October 12, 2022         Assessment and Plan:   Shira Rodriguez is a 63 yo F with medical history significant for chronic bronchitis, rheumatoid arthritis, obstructive sleep apnea, prior tobacco dependence who is in the pulmonary clinic for follow-up of chronic cough.      Etiology of chronic cough is unknown at this time and may be secondary to chronic bronchitis given smoking history, GERD.  Recent chest CT with ill-defined groundglass opacities and interlobular septal thickening as well as dilated pulmonary artery.  PFTs suggest early reactive airway disease we will therefore trial an albuterol inhaler.    1.  Chronic productive cough  2.  Prior tobacco dependence  3.  CT with ill-defined GGO and interlobular septal thickening, dilated PA -GGO has resolved on follow-up but she continues to have a chronic left pleural effusion  4.  Rheumatoid arthritis, previously on methotrexate approximately 4 years  5. H/o blood clots (2019)      Recommendations as follows:  -Trial albuterol inhaler for cough; use aerobika for airway clearance  -She completed Augmentin for 2 weeks at last visit with improvement in her cough  -Ultrasound-guided thoracentesis to evaluate left-sided pleural effusion  -VQ scan to evaluate etiology of dilated PA -negative and TTE 6/2/22 showed concentric LVH but otherwise wnl   -Trial of Prilosec for 1 month  - try to increase regular activity with a goal of at least 3 sessions/week of 30 minutes of cardiovascular exercise; start slow and work up to this goal  -Stay up-to-date with yearly influenza vaccines, pneumonia series (prevnar 13 and pneumovax), and Covid shots    Return to clinic in approximately 4 months.     Indira Pizano MD  Pulmonary, Allergy, Critical Care, and Sleep Medicine   Pager: [phone]    This note was created using dictation software and may contain errors.  Please contact the creator for any clarifications  "that are needed.    I have spent 30 minutes on the day of the visit to review the chart, interview and examine the patient, review labs and imaging, formulate a plan, document and submit orders. Time documented is excluding time spent for PFT interpretation        HPI:    Shira Rodriguez is a 61 yo F with medical history significant for chronic bronchitis, rheumatoid arthritis, obstructive sleep apnea, prior tobacco dependence who is in the pulmonary clinic for follow-up of chronic cough.  She has previously seen Dr. Richardson, last in 2020 and was seen by myself in June 2022.     She is currently doing well.  She did note that her cough improved with the antibiotic at her last visit but now has come back and is persistent.  Occurring every single day and is productive of white, at times thick mucus.  She did use aerobic but is not sure if it helped so she stopped using this.  The cough is worse when she lays down at nighttime.  She never trialed albuterol for the cough and is not using that either.  She denies GERD, allergy symptoms as well as PND.     She is fairly inactive and limited by low back pain.  She is currently doing PT for her back pain.  She reports ongoing fatigue.  She continues to not smoke and quit in 2019.        Prior:     She has had a cough for greater than 5 years.  This was initially attributed to smoking but she has not smoked for 3 years and it continues to be productive of yellow/white mucus.  She does feel like there are \"plugs\" that come out.  The cough is worse at night despite sleeping at 45 degree angle chronically.  She denies hemoptysis.    Cough is worse in regards to frequency.  There are no associations with food, no aspiration, no acid reflux or postnasal drainage.  Also worse when she first wakes up for which she has to clear a lot of mucus initially.  She denies dental issues, recurrent infections.  She does state that her voice does get hoarse intermittently when she is " mobilizing mucus but clears with coughing.    She was previously on methotrexate (3 to 4 years) but she stopped taking this about a month ago for her RA and she ran out.  She has not noticed any changes in her rheumatoid symptoms which are predominantly in her hands.  Symptoms have always been quite mild and her diagnosis was predominantly from blood work.  She does have an upcoming appointment with her rheumatologist in the next month.    She is not currently on inhalers but used albuterol many years ago.  She denies occupational or home exposures to mold, pets, asbestos, tuberculosis.           Review of Systems:     A 13 point ROS was performed and was negative except as listed above in the HPI.           Past Medical and Surgical History:     Past Medical History:   Diagnosis Date     Chronic bronchitis (H) 07/30/2015     Contact dermatitis      Depressive disorder 1985     Eczema     HANDS     Elevated liver enzymes      H/O total knee replacement, left      History of total hip replacement 10/27/2011     Hyperlipidemia      Hypertension      Morbid obesity (H)      Osteoarthrosis     right knee     RA (rheumatoid arthritis) (H) 8/2018     Sleep apnea      Tobacco use disorder      Varicella 1965     Past Surgical History:   Procedure Laterality Date     ARTHROPLASTY KNEE  6/14/2012    Procedure: ARTHROPLASTY KNEE;  Left Total Knee Arthroplasty;  Surgeon: Annette Quesada MD;  Location: [facility] OR     ARTHROSCOPY HIP Right      BRONCHOSCOPY FLEXIBLE AND RIGID N/A 9/17/2019    Procedure: Bronchoscopy flexible;  Surgeon: Patrick Rayo MD;  Location:  OR     COLONOSCOPY N/A 7/16/2021    Procedure: COLONOSCOPY, WITH POLYPECTOMY AND BIOPSY;  Surgeon: Diogo Lynch MD;  Location:  GI     CV EXTRACORPERAL MEMBRANE OXYGENATION N/A 9/9/2019    Procedure: Extracorporeal Membrance Oxygenation;  Surgeon: Kaleb Mcfarlane MD;  Location:  HEART CARDI[facility] CATH LAB     INSERT EXTRACORPORAL MEMBRANE  OXYGENATOR N/A 2019    Procedure: Venous-Venous cannulation using ultrasound guidance and transesophageal echocardiogram, venous-arterial ecmo decannulation and repair of left femoral artery;  Surgeon: Patrick Rayo MD;  [facility]     [facility]  9/10/2019     IR PULMONARY ANGIOGRAM BILATERAL  9/10/2019     IR THROMBOLYSIS ARTERIAL INFUSION INITIAL DAY  9/10/2019     THORACENTESIS N/A 2020    Procedure: THORACENTESIS;  Surgeon: Georgina Richardson MD;  Location: [facility] TOTAL HIP ARTHROPLASTY  04    RT           Family History:     Family History   Problem Relation Age of Onset     Thyroid Disease Mother      Hypertension Mother      Skin Cancer Mother         MMohs surgery with skin graft     Cerebrovascular Disease Mother         CVA after endarderectomy-      Diabetes Mother      Depression Mother      Alcoholism Father      Heart Disease Father      Substance Abuse Father              Heart Disease Sister         multiple ablations     Alcoholism Sister      Substance Abuse Sister         Sober     Depression Sister      Substance Abuse Sister         Sober 30 plus years     Depression Sister      Thyroid Disease Sister      Hypertension Maternal Grandmother      Depression Maternal Grandmother      Breast Cancer Paternal Grandmother      Pancreatic Cancer Paternal Grandmother      Prostate Cancer Paternal Grandfather      Cardiovascular Paternal Aunt      Cardiovascular Paternal Uncle      Depression Cousin      Depression Other             Social History:     Social History     Socioeconomic History     Marital status: Single     Spouse name: Not on file     Number of children: Not on file     Years of education: Not on file     Highest education level: Not on file   Occupational History     Occupation: registered nurse     Comment: [employer]   Tobacco Use     Smoking status: Former     Packs/day: 0.00     Years: 33.00     " Pack years: 0.00     Types: Cigarettes     Start date: 1/1/1982     Quit date: 9/9/2019     Years since quitting: 3.0     Smokeless tobacco: Never   Vaping Use     Vaping Use: Never used   Substance and Sexual Activity     Alcohol use: Yes     Comment: Rarely     Drug use: No     Sexual activity: Not Currently     Birth control/protection: Abstinence, Post-menopausal   Other Topics Concern     Parent/sibling w/ CABG, MI or angioplasty before 65F 55M? Not Asked   Social History Narrative     Not on file     Social Determinants of Health     Financial Resource Strain: Not on file   Food Insecurity: Not on file   Transportation Needs: Not on file   Physical Activity: Not on file   Stress: Not on file   Social Connections: Not on file   Intimate Partner Violence: Not on file   Housing Stability: Not on file            Medications:     Current Outpatient Medications   Medication     acetaminophen (TYLENOL) 325 MG tablet     albuterol (PROAIR HFA/PROVENTIL HFA/VENTOLIN HFA) 108 (90 Base) MCG/ACT inhaler     ARIPiprazole (ABILIFY) 10 MG tablet     DULoxetine (CYMBALTA) 60 MG capsule     lisinopril (ZESTRIL) 5 MG tablet     VITAMIN D3 25 MCG (1000 UT) tablet     warfarin ANTICOAGULANT (COUMADIN) 5 MG tablet     atorvastatin (LIPITOR) 40 MG tablet     insulin syringe-needle U-100 (BD INSULIN SYRINGE ULTRAFINE) 30G X 1/2\" 1 ML miscellaneous     Current Facility-Administered Medications   Medication     influenza recomb quadrivalent PF (FLUBLOK) injection 0.5 mL     Facility-Administered Medications Ordered in Other Visits   Medication     sodium chloride (PF) 0.9% PF flush 10 mL            Physical Exam:   /74 (BP Location: Right arm, Patient Position: Chair, Cuff Size: Adult Regular)   Pulse 97   SpO2 96%     Constitutional:   Awake, alert and in no apparent distress     Eyes:   nonicteric     HEENT:   Supple without supraclavicular or cervical lymphadenopathy     Lungs:   Good air flow.  No crackles. No rhonchi.  " No wheezes.     Cardiovascular:   Normal S1 and S2.  RRR.  No murmur, gallop or rub.     Musculoskeletal:   No edema, no digital clubbing present     Neurologic:   Alert and conversant.     Skin:   Warm, dry.  No rash on limited exam.             Data:   All laboratory and imaging data reviewed personally.      Specimen Description   Date Value Ref Range Status   01/16/2020 Pleural fluid Left lower lobe  Final   01/16/2020 Pleural fluid Left lower lobe  Final   10/04/2019 Pleural fluid  Final   10/04/2019 Pleural fluid  Final    Culture Micro   Date Value Ref Range Status   01/16/2020 No growth  Final   10/04/2019 No growth  Final   09/20/2019 No growth  Final        Recent Results (from the past 168 hour(s))   General PFT Lab (Please always keep checked)    Collection Time: 10/10/22  9:46 AM   Result Value Ref Range    FVC-Pred 3.36 L    FVC-Pre 2.64 L    FVC-%Pred-Pre 78 %    FEV1-Pre 2.17 L    FEV1-%Pred-Pre 82 %    FEV1FVC-Pred 79 %    FEV1FVC-Pre 82 %    FEFMax-Pred 6.55 L/sec    FEFMax-Pre 5.13 L/sec    FEFMax-%Pred-Pre 78 %    FEF2575-Pred 2.33 L/sec    FEF2575-Pre 2.25 L/sec    TXS7062-%Pred-Pre 96 %    ExpTime-Pre 6.06 sec    FIFMax-Pre 3.26 L/sec    VC-Pred 3.50 L    VC-Pre 2.54 L    VC-%Pred-Pre 72 %    IC-Pred 3.33 L    IC-Pre 1.81 L    IC-%Pred-Pre 54 %    ERV-Pred 0.17 L    ERV-Pre 0.73 L    ERV-%Pred-Pre 425 %    FEV1FEV6-Pred 81 %    FEV1FEV6-Pre 82 %    FRCPleth-Pred 2.82 L    FRCPleth-Pre 2.10 L    FRCPleth-%Pred-Pre 74 %    RVPleth-Pred 2.01 L    RVPleth-Pre 1.37 L    RVPleth-%Pred-Pre 67 %    TLCPleth-Pred 5.27 L    TLCPleth-Pre 3.91 L    TLCPleth-%Pred-Pre 74 %    DLCOunc-Pred 21.54 ml/min/mmHg    DLCOunc-Pre 17.69 ml/min/mmHg    DLCOunc-%Pred-Pre 82 %    VA-Pre 3.39 L    VA-%Pred-Pre 65 %    FEV1SVC-Pred 75 %    FEV1SVC-Pre 85 %       PFT: 9/4/20 - Possible early reactive airway disease as evidence by reduced FEV1 and FEV with normal FEV1/FVC. TLC is wnl without evidence of air trapping or  hyperinflation. Normal diffusion capacity.     CT chest- 5/24/2022-personally reviewed:  Small left-sided pleural effusion, mild bibasilar atelectasis.  Right lower lobe granuloma.  GGO in the posterior right lower lobe.  Dilated pulmonary artery.                                                                   IMPRESSION:      1. Stable small left pleural effusion.  2. Patchy areas of ill-defined groundglass opacity are increased  compared to prior, and likely represent areas of  infection/inflammation.  3. Unchanged dilation of the main pulmonary artery, which can be seen  in pulmonary hypertension.  4. Cholelithiasis without evidence for acute cholecystitis.  5. Stable 3.8 cm right adrenal adenoma.    CT chest-7/21/2022-personally reviewed and I agree with radiologist exam:    FINDINGS: No contrast. Thyroid appears grossly unremarkable. Mitral  annular and aortic annular calcifications. Thoracic aortic and  coronary artery calcifications. Small left pleural effusion grossly  unchanged. Small hiatal hernia. No adenopathy in the chest.  Dependent atelectasis in the right lung base. Atelectasis and mild  edema associated with the left pleural effusion. No dominant pulmonary  nodule. Mild air trapping on expiration. Prone imaging again shows the  subsegmental atelectasis and edema on the left to be not fixed. Bones  degenerative changes in the thoracic spine.                                                                      IMPRESSION: Atherosclerosis. Left pleural effusion with associated  atelectasis/mild edema.

## 2022-10-13 ENCOUNTER — HOSPITAL ENCOUNTER (OUTPATIENT)
Dept: PHYSICAL THERAPY | Facility: CLINIC | Age: 62
Discharge: HOME | End: 2022-10-13
Payer: COMMERCIAL

## 2022-10-13 ENCOUNTER — TELEPHONE (OUTPATIENT)
Dept: ANTICOAGULATION | Facility: CLINIC | Age: 62
End: 2022-10-13

## 2022-10-13 DIAGNOSIS — I26.99 PULMONARY EMBOLI (H): ICD-10-CM

## 2022-10-13 DIAGNOSIS — R68.89 DECREASED ACTIVITY TOLERANCE: ICD-10-CM

## 2022-10-13 DIAGNOSIS — Z79.01 LONG TERM CURRENT USE OF ANTICOAGULANT THERAPY: Primary | ICD-10-CM

## 2022-10-13 DIAGNOSIS — M62.81 GENERALIZED MUSCLE WEAKNESS: Primary | ICD-10-CM

## 2022-10-13 DIAGNOSIS — Z74.09 IMPAIRED FUNCTIONAL MOBILITY, BALANCE, GAIT, AND ENDURANCE: ICD-10-CM

## 2022-10-13 PROCEDURE — 97110 THERAPEUTIC EXERCISES: CPT | Mod: GP | Performed by: PHYSICAL THERAPIST

## 2022-10-13 NOTE — TELEPHONE ENCOUNTER
Patient has a thoracentesis scheduled for 10/24/22.  Patient will have a Cr level drawn on 10/19/22.  Patient understands that she will need to start holding warfarin on 10/19.  Lovenox dosing will be given once Cr level is back.     Estimated Creatinine Clearance: 58.3 mL/min (A) (based on SCr of 1.42 mg/dL (H)).     BMI=47    0.75mg/kg should be used for Lovenox bridging with CrCl of 58.3 and BMI >40    The below plan was given verbal approval by Dr. Anjel Busby.    10/19 start holding warfarin  10/20 hold warfarin  10/21 hold warfarin; start Lovenox 100mg BID  10/22 hold warfarin;Lovenox 100mg BID  10/23 hold warfarin; Lovenox 100mg AM dose only  10/24 Day of procedure.  Patient should ask MD doing procedure if okay to restart anticoagulation. If okay then warfarin can be restarted evening of procedure at 10mg and then resume typical warfarin pattern  10/25 restart Lovenox 100mg Q 12 hours and continue with Lovenox until the INR >2.0.   10/28 Check INR.

## 2022-10-17 ENCOUNTER — TELEPHONE (OUTPATIENT)
Dept: PULMONOLOGY | Facility: CLINIC | Age: 62
End: 2022-10-17

## 2022-10-17 NOTE — TELEPHONE ENCOUNTER
Talked with patient and scheduled lab appointment on 10/24/22 prior to Thoracentesis on 10/24/22, per Dr. Pizano. Patient agreeable to lab appointment on 10/24/22. Appt date/time confirmed with patient.

## 2022-10-18 NOTE — PATIENT INSTRUCTIONS
Shira,    It was nice seeing you!  I am glad to hear you are doing well.  We will trial an antacid medication to see if this helps with your chronic cough.  Additionally, we will further evaluate the chronic left-sided effusion, the fluid surrounding the lung.    Recommendations as follows:   -Omeprazole for 4 weeks  -Trial the albuterol inhaler when you have a coughing spell  -Schedule ultrasound guided thoracentesis to drain the fluid around your lung  - Please call the pulmonary clinic with any questions. The pulmonary clinic number is: [phone]    Stay up to date with vaccinations including your yearly flu vaccine. Please continue to social distance which does not exclude going outside and enjoying our wonderful weather! Wash your hands regularly. Wear a mask when unable to social distance (such as in the grocery store).  I recommend that you receive the COVID-19 vaccine.     Have a nice fall and stay well! Please let me know if you have questions or concerns.     Smita Pizano MD  Pulmonary, Allergy, Critical Care, and Sleep Medicine   [facility]

## 2022-10-19 ENCOUNTER — LAB (OUTPATIENT)
Dept: LAB | Facility: CLINIC | Age: 62
End: 2022-10-19
Payer: COMMERCIAL

## 2022-10-19 DIAGNOSIS — Z79.01 LONG TERM CURRENT USE OF ANTICOAGULANT THERAPY: ICD-10-CM

## 2022-10-19 LAB
CREAT SERPL-MCNC: 1.42 MG/DL (ref 0.52–1.04)
GFR SERPL CREATININE-BSD FRML MDRD: 42 ML/MIN/1.73M2

## 2022-10-19 PROCEDURE — 82565 ASSAY OF CREATININE: CPT

## 2022-10-19 PROCEDURE — 36415 COLL VENOUS BLD VENIPUNCTURE: CPT

## 2022-10-20 RX ORDER — ENOXAPARIN SODIUM 100 MG/ML
100 INJECTION SUBCUTANEOUS 2 TIMES DAILY
Qty: 20 ML | Refills: 1 | Status: SHIPPED | OUTPATIENT
Start: 2022-10-20 | End: 2023-01-28

## 2022-10-24 ENCOUNTER — HOSPITAL ENCOUNTER (OUTPATIENT)
Dept: ULTRASOUND IMAGING | Facility: CLINIC | Age: 62
Discharge: HOME | End: 2022-10-24
Attending: INTERNAL MEDICINE
Payer: COMMERCIAL

## 2022-10-24 ENCOUNTER — LAB (OUTPATIENT)
Dept: LAB | Facility: CLINIC | Age: 62
End: 2022-10-24
Attending: INTERNAL MEDICINE
Payer: COMMERCIAL

## 2022-10-24 DIAGNOSIS — M19.90 INFLAMMATORY ARTHRITIS: ICD-10-CM

## 2022-10-24 DIAGNOSIS — J90 PLEURAL EFFUSION: ICD-10-CM

## 2022-10-24 DIAGNOSIS — R05.9 COUGH, UNSPECIFIED TYPE: ICD-10-CM

## 2022-10-24 DIAGNOSIS — K21.00 GASTROESOPHAGEAL REFLUX DISEASE WITH ESOPHAGITIS WITHOUT HEMORRHAGE: ICD-10-CM

## 2022-10-24 LAB
APPEARANCE FLD: ABNORMAL
CELL COUNT BODY FLUID SOURCE: ABNORMAL
COLOR FLD: COLORLESS
LD BODY BODY FLUID SOURCE: NORMAL
LDH FLD L TO P-CCNC: 229 U/L
LDH SERPL L TO P-CCNC: 300 U/L (ref 81–234)
LYMPHOCYTES NFR FLD MANUAL: NORMAL %
MONOS+MACROS NFR FLD MANUAL: NORMAL %
NEUTS BAND NFR FLD MANUAL: NORMAL %
PH BODY FLUID SOURCE: NORMAL
PH FLD: 7 PH
PROT FLD-MCNC: 1.2 G/DL
PROT SERPL-MCNC: 8 G/DL (ref 6.8–8.8)
PROTEIN BODY FLUID SOURCE: NORMAL

## 2022-10-24 PROCEDURE — 36415 COLL VENOUS BLD VENIPUNCTURE: CPT

## 2022-10-24 PROCEDURE — 86431 RHEUMATOID FACTOR QUANT: CPT

## 2022-10-24 PROCEDURE — 86200 CCP ANTIBODY: CPT

## 2022-10-24 PROCEDURE — 87070 CULTURE OTHR SPECIMN AEROBIC: CPT

## 2022-10-24 PROCEDURE — 83615 LACTATE (LD) (LDH) ENZYME: CPT

## 2022-10-24 PROCEDURE — 84155 ASSAY OF PROTEIN SERUM: CPT

## 2022-10-24 PROCEDURE — 272N000706 US THORACENTESIS

## 2022-10-24 PROCEDURE — 89051 BODY FLUID CELL COUNT: CPT

## 2022-10-24 PROCEDURE — 87205 SMEAR GRAM STAIN: CPT

## 2022-10-24 PROCEDURE — 83986 ASSAY PH BODY FLUID NOS: CPT

## 2022-10-24 PROCEDURE — 84157 ASSAY OF PROTEIN OTHER: CPT

## 2022-10-24 PROCEDURE — 88305 TISSUE EXAM BY PATHOLOGIST: CPT | Mod: TC

## 2022-10-24 PROCEDURE — 88112 CYTOPATH CELL ENHANCE TECH: CPT | Mod: TC

## 2022-10-24 RX ORDER — LIDOCAINE HYDROCHLORIDE 10 MG/ML
10 INJECTION, SOLUTION EPIDURAL; INFILTRATION; INTRACAUDAL; PERINEURAL ONCE
Status: COMPLETED | OUTPATIENT
Start: 2022-10-24 | End: 2022-10-24

## 2022-10-24 RX ADMIN — LIDOCAINE HYDROCHLORIDE 10 ML: 10 INJECTION, SOLUTION EPIDURAL; INFILTRATION; INTRACAUDAL; PERINEURAL at 11:52

## 2022-10-25 LAB — RHEUMATOID FACT SER NEPH-ACNC: 219 IU/ML

## 2022-10-26 LAB
CCP AB SER IA-ACNC: 11 U/ML
PATH REPORT.COMMENTS IMP SPEC: NORMAL
PATH REPORT.FINAL DX SPEC: NORMAL
PATH REPORT.GROSS SPEC: NORMAL
PATH REPORT.RELEVANT HX SPEC: NORMAL

## 2022-10-26 PROCEDURE — 88112 CYTOPATH CELL ENHANCE TECH: CPT | Mod: 26 | Performed by: PATHOLOGY

## 2022-10-26 PROCEDURE — 88305 TISSUE EXAM BY PATHOLOGIST: CPT | Mod: 26 | Performed by: PATHOLOGY

## 2022-10-28 ENCOUNTER — TELEPHONE (OUTPATIENT)
Dept: LAB | Facility: CLINIC | Age: 62
End: 2022-10-28

## 2022-10-28 ENCOUNTER — LAB (OUTPATIENT)
Dept: LAB | Facility: CLINIC | Age: 62
End: 2022-10-28
Payer: COMMERCIAL

## 2022-10-28 ENCOUNTER — ANTICOAGULATION THERAPY VISIT (OUTPATIENT)
Dept: ANTICOAGULATION | Facility: CLINIC | Age: 62
End: 2022-10-28

## 2022-10-28 DIAGNOSIS — I26.99 PULMONARY EMBOLI (H): ICD-10-CM

## 2022-10-28 DIAGNOSIS — Z79.01 LONG TERM CURRENT USE OF ANTICOAGULANT THERAPY: ICD-10-CM

## 2022-10-28 DIAGNOSIS — I26.99 PULMONARY EMBOLI (H): Primary | ICD-10-CM

## 2022-10-28 LAB — INR BLD: 1.3 (ref 0.9–1.1)

## 2022-10-28 PROCEDURE — 85610 PROTHROMBIN TIME: CPT

## 2022-10-28 PROCEDURE — 36416 COLLJ CAPILLARY BLOOD SPEC: CPT

## 2022-10-28 NOTE — PROGRESS NOTES
ANTICOAGULATION MANAGEMENT     Shira Rodriguez 62 year old female is on warfarin with subtherapeutic INR result. (Goal INR 2.0-3.0)    Recent labs: (last 7 days)     10/28/22  1310   INR 1.3*       ASSESSMENT       Source(s): Chart Review       Warfarin doses taken: Reviewed in chart    Diet: No new diet changes identified    New illness, injury, or hospitalization: Yes: recent Thoracentesis     Medication/supplement changes: None noted    Signs or symptoms of bleeding or clotting: No    Previous INR: Therapeutic last 2(+) visits    Additional findings: Bridging with Enoxaparin until INR >= 2.0. Instructed patient to continue Lovenox 100mg Q 12 Hours        PLAN     Recommended plan for temporary change(s) affecting INR     Dosing Instructions: booster dose then continue your current warfarin dose with next INR in 3-4 days       Summary  As of 10/28/2022    Full warfarin instructions:  10/28: 10 mg; Otherwise 2.5 mg every Wed; 5 mg all other days; Starting 10/28/2022   Next INR check:  10/31/2022             Detailed voice message left for Shira with dosing instructions and follow up date.   Sent [employer] message with dosing and follow up instructions    Contact [phone] to schedule and with any changes, questions or concerns.     Education provided:     Please call back if any changes to your diet, medications or how you've been taking warfarin    Contact [phone] with any changes, questions or concerns.     Plan made per [facility] anticoagulation protocol    Patience Martins RN  Anticoagulation Clinic  10/28/2022    _______________________________________________________________________     Anticoagulation Episode Summary     Current INR goal:  2.0-3.0   TTR:  62.2 % (1 y)   Target end date:  Indefinite   Send INR reminders to:  [facility]    Indications    Pulmonary emboli (H) [I26.99]  Long term current use of anticoagulant therapy [Z79.01]           Comments:           Anticoagulation Care Providers      Provider Role Specialty Phone number    Anjel Busby MD Referring Family Medicine [phone]

## 2022-10-28 NOTE — PROGRESS NOTES
"Pt came in for INR handed tech sputum specimen with an appointment label on it.  Patient stated when asked to come back to lab to look into the chart fro orders patient stated \"they mailed me the cup, So I know that there are order\"    Tech came back and the order in the chart was for pleural fluid.    Tech called number linked to the providers name on the cup and had to leave a message 1:20    Tech called patient l/m to return call 1:26    Pt returned call and I informed them that unfortunately the order in the chart was incorrect and we tried reaching out to the providers team and could only leave a message.  I also let patient know that if we do not have proper orders by 4:00 we would be unable to send specimen and it would be properly discarded.    Pt will try to call provider for orders and seemed to understand the situation  "

## 2022-10-29 LAB
BACTERIA PLR CULT: NO GROWTH
GRAM STAIN RESULT: NORMAL
GRAM STAIN RESULT: NORMAL

## 2022-10-31 ENCOUNTER — HOSPITAL ENCOUNTER (OUTPATIENT)
Dept: PHYSICAL THERAPY | Facility: CLINIC | Age: 62
Discharge: HOME | End: 2022-10-31
Payer: COMMERCIAL

## 2022-10-31 DIAGNOSIS — R68.89 DECREASED ACTIVITY TOLERANCE: ICD-10-CM

## 2022-10-31 DIAGNOSIS — Z74.09 IMPAIRED FUNCTIONAL MOBILITY, BALANCE, GAIT, AND ENDURANCE: ICD-10-CM

## 2022-10-31 DIAGNOSIS — M62.81 GENERALIZED MUSCLE WEAKNESS: Primary | ICD-10-CM

## 2022-10-31 PROCEDURE — 97110 THERAPEUTIC EXERCISES: CPT | Mod: GP | Performed by: PHYSICAL THERAPIST

## 2022-11-01 ENCOUNTER — LAB (OUTPATIENT)
Dept: LAB | Facility: CLINIC | Age: 62
End: 2022-11-01
Payer: COMMERCIAL

## 2022-11-01 ENCOUNTER — ANTICOAGULATION THERAPY VISIT (OUTPATIENT)
Dept: ANTICOAGULATION | Facility: CLINIC | Age: 62
End: 2022-11-01

## 2022-11-01 DIAGNOSIS — I26.99 PULMONARY EMBOLI (H): Primary | ICD-10-CM

## 2022-11-01 DIAGNOSIS — Z79.01 LONG TERM CURRENT USE OF ANTICOAGULANT THERAPY: ICD-10-CM

## 2022-11-01 DIAGNOSIS — I26.99 PULMONARY EMBOLI (H): ICD-10-CM

## 2022-11-01 LAB — INR BLD: 1.9 (ref 0.9–1.1)

## 2022-11-01 PROCEDURE — 85610 PROTHROMBIN TIME: CPT

## 2022-11-01 PROCEDURE — 36416 COLLJ CAPILLARY BLOOD SPEC: CPT

## 2022-11-01 NOTE — PROGRESS NOTES
ANTICOAGULATION MANAGEMENT     Shira Rodriguez 62 year old female is on warfarin with subtherapeutic INR result. (Goal INR 2.0-3.0)    Recent labs: (last 7 days)     11/01/22  1345   INR 1.9*       ASSESSMENT       Source(s): Chart Review and Patient/Caregiver Call       Warfarin doses taken: Warfarin taken as instructed-recent Warfarin hold for procedure    Diet: No new diet changes identified    New illness, injury, or hospitalization: No    Medication/supplement changes: None noted    Signs or symptoms of bleeding or clotting: No    Previous INR: Subtherapeutic    Additional findings: Bridging with Enoxaparin until INR >= 2.0. Continue Lovenox until INR 11/4. Patient reports that she has enough injections left       PLAN     Recommended plan for temporary change(s) affecting INR     Dosing Instructions: booster dose then continue your current warfarin dose Continue bridging with Enoxaparin with next INR in 3 days       Summary  As of 11/1/2022    Full warfarin instructions:  11/1: 7.5 mg; Otherwise 2.5 mg every Wed; 5 mg all other days; Starting 11/1/2022   Next INR check:  11/4/2022             Telephone call with Shira who verbalizes understanding and agrees to plan and who agrees to plan and repeated back plan correctly    Lab visit scheduled    Education provided:     Contact [phone] with any changes, questions or concerns.     Plan made per [facility] anticoagulation protocol    YUNIER DAVIDSON RN  Anticoagulation Clinic  11/1/2022    _______________________________________________________________________     Anticoagulation Episode Summary     Current INR goal:  2.0-3.0   TTR:  61.1 % (1 y)   Target end date:  Indefinite   Send INR reminders to:  [facility]    Indications    Pulmonary emboli (H) [I26.99]  Long term current use of anticoagulant therapy [Z79.01]           Comments:           Anticoagulation Care Providers     Provider Role Specialty Phone number    Anjel Busby MD Referring  Family Medicine [phone]

## 2022-11-02 NOTE — PLAN OF CARE
[facility] Trans[facility] Initial Follow Up Call    Outreach made within 2 business days of discharge: Yes    Patient: Martín Warren Patient : 1966   MRN: [id number]  Reason for Admission: There are no discharge diagnoses documented for the most recent discharge. Discharge Date: 10/31/22       Spoke with: Milana Jacob, patient will call for appt    Discharge department/facility: [address] Interactive Patient Contact:  Was patient able to fill all prescriptions: Yes  Was patient instructed to bring all medications to the follow-up visit: Yes  Is patient taking all medications as directed in the discharge summary? Yes  Does patient understand their discharge instructions: Yes  Does patient have questions or concerns that need addressed prior to 7-14 day follow up office visit: no    Scheduled appointment with PCP within 7-14 days    Follow Up  No future appointments.     [facility] OT/6[facility]: Cancel. Patient having thoracentesis this PM with ongoing medical follow-up needs. Will reschedule.

## 2022-11-04 ENCOUNTER — ANTICOAGULATION THERAPY VISIT (OUTPATIENT)
Dept: ANTICOAGULATION | Facility: CLINIC | Age: 62
End: 2022-11-04

## 2022-11-04 ENCOUNTER — LAB (OUTPATIENT)
Dept: LAB | Facility: CLINIC | Age: 62
End: 2022-11-04
Payer: COMMERCIAL

## 2022-11-04 DIAGNOSIS — I26.99 PULMONARY EMBOLI (H): Primary | ICD-10-CM

## 2022-11-04 DIAGNOSIS — Z79.01 LONG TERM CURRENT USE OF ANTICOAGULANT THERAPY: ICD-10-CM

## 2022-11-04 DIAGNOSIS — I26.99 PULMONARY EMBOLI (H): ICD-10-CM

## 2022-11-04 LAB — INR BLD: 3 (ref 0.9–1.1)

## 2022-11-04 PROCEDURE — 85610 PROTHROMBIN TIME: CPT

## 2022-11-04 PROCEDURE — 36416 COLLJ CAPILLARY BLOOD SPEC: CPT

## 2022-11-04 NOTE — PROGRESS NOTES
ANTICOAGULATION MANAGEMENT     Shira Rodriguez 62 year old female is on warfarin with therapeutic INR result. (Goal INR 2.0-3.0)    Recent labs: (last 7 days)     11/04/22  1350   INR 3.0*       ASSESSMENT       Source(s): Patient/Caregiver Call       Warfarin doses taken: Warfarin taken as instructed    Diet: No new diet changes identified    New illness, injury, or hospitalization: No    Medication/supplement changes: None noted    Signs or symptoms of bleeding or clotting: No    Previous INR: Subtherapeutic    Additional findings: Instructions to discontinue Lovenox        PLAN     Recommended plan for no diet, medication or health factor changes affecting INR     Dosing Instructions: Continue your current warfarin dose with next INR in 2 weeks       Summary  As of 11/4/2022    Full warfarin instructions:  2.5 mg every Wed; 5 mg all other days; Starting 11/4/2022   Next INR check:  11/18/2022             Telephone call with Shira who verbalizes understanding and agrees to plan and who agrees to plan and repeated back plan correctly    Lab visit scheduled    Education provided:     None required    Plan made per [facility] anticoagulation protocol    Patience Martins RN  Anticoagulation Clinic  11/4/2022    _______________________________________________________________________     Anticoagulation Episode Summary     Current INR goal:  2.0-3.0   TTR:  61.0 % (1 y)   Target end date:  Indefinite   Send INR reminders to:  [facility]    Indications    Pulmonary emboli (H) [I26.99]  Long term current use of anticoagulant therapy [Z79.01]           Comments:           Anticoagulation Care Providers     Provider Role Specialty Phone number    Anjel Busby MD Referring Family Medicine [phone]

## 2022-11-07 ENCOUNTER — HOSPITAL ENCOUNTER (OUTPATIENT)
Dept: PHYSICAL THERAPY | Facility: CLINIC | Age: 62
Discharge: HOME | End: 2022-11-07
Payer: COMMERCIAL

## 2022-11-07 DIAGNOSIS — M62.81 GENERALIZED MUSCLE WEAKNESS: Primary | ICD-10-CM

## 2022-11-07 DIAGNOSIS — Z74.09 IMPAIRED FUNCTIONAL MOBILITY, BALANCE, GAIT, AND ENDURANCE: ICD-10-CM

## 2022-11-07 PROCEDURE — 97110 THERAPEUTIC EXERCISES: CPT | Mod: GP | Performed by: PHYSICAL THERAPIST

## 2022-11-07 NOTE — PROGRESS NOTES
"[facility]    Outpatient Physical Therapy Discharge Note  Patient: Shira Rodriguez  : 1960    Beginning/End Dates of Reporting Period:  2022 to 2022    Referring Provider: Anjel Busby MD    Therapy Diagnosis: Impaired gait and balance     Client Self Report: Pt reports shoulder pain has increased and has made appointment to address. Shoulder pain appears to impact daily life as she reports difficulty getting out of chair without shoulder pain, impacting motivation to remain active.    Objective Measurements:     Objective Measure: 30\" Chair Stand  Details: 7 reps     Goals:  Goal Identifier TUG   Goal Description Patient to improve on TUG to <12 seconds with no AD to display improve safety navigating home environment and decreased falls risk.   Target Date 22   Date Met  22   Progress (detail required for progress note): MET - Improved from 14.08seconds to 10.31 seconds.     Goal Identifier Gait Speed   Goal Description Patient to improve gait speed to >0.8 m/s in order to display improved safety with home and community ambulation.   Target Date 22   Date Met  22   Progress (detail required for progress note): MET - Pt improved from  0.69 m/s t o 0.81 m/s without AD.     Goal Identifier Functional LE Strength  - 30 STS   Goal Description Patient to improve with 30 second sit to stand to 9 repetitions to display improved LE endurance for greater tolerance and safety with completion of transfers from chairs, bed and in/out of cars.   Target Date 22   Date Met      Progress (detail required for progress note): PROGRESSING: Pt improved from 5 rep to 8 reps.; 22: 7 reps     Goal Identifier HEP   Goal Description Patient to independently complete regular exercise program with correct mechanics in order to safely manage impairments upon discharge from " skilled therapy.   Target Date 11/30/22   Date Met   11/7/22   Progress (detail required for progress note):  MET; demonstrates understanding and proper technique.      Plan:  Discharge from therapy.    Reason for Discharge: Patient has met all but one goal but has demonstrated improvement from baseline.    Equipment Issued: N/A    Discharge Plan: Patient to continue home program. Pt has met or made significant progress toward goals demonstrating improved functional mobility for safe home and community navigation. Pt continues to be limited by ongoing pulmonary issues, restricting endurance for community navigation. Pt also continues to experience shoulder pain, limiting tolerance for functional tasks, but she has an appointment set up to address this issue in the near future.     SPT under direct supervision of PT with PT directing treatment and plan of care.  - Brodie Wolfe, PT, DPT, NCS

## 2022-11-07 NOTE — TELEPHONE ENCOUNTER
DIAGNOSIS: right shoulder pain / self / [employer]   APPOINTMENT DATE: 11.15.22   NOTES STATUS DETAILS   OFFICE NOTE from referring provider NA    OFFICE NOTE from other specialist NA    DISCHARGE SUMMARY from hospital NA    DISCHARGE REPORT from the ER NA    OPERATIVE REPORT NA    EMG report NA    MEDICATION LIST NA    MRI NA    DEXA (osteoporosis/bone health) NA    CT SCAN NA    XRAYS (IMAGES & REPORTS) NA      Action 11.7.22 1:36 PM BH   Action Taken Pt has no records or images related to r shoulder.

## 2022-11-08 DIAGNOSIS — M25.511 RIGHT SHOULDER PAIN: Primary | ICD-10-CM

## 2022-11-15 ENCOUNTER — ANCILLARY PROCEDURE (OUTPATIENT)
Dept: GENERAL RADIOLOGY | Facility: CLINIC | Age: 62
End: 2022-11-15
Attending: FAMILY MEDICINE
Payer: COMMERCIAL

## 2022-11-15 ENCOUNTER — OFFICE VISIT (OUTPATIENT)
Dept: ORTHOPEDICS | Facility: CLINIC | Age: 62
End: 2022-11-15
Payer: COMMERCIAL

## 2022-11-15 ENCOUNTER — PRE VISIT (OUTPATIENT)
Dept: ORTHOPEDICS | Facility: CLINIC | Age: 62
End: 2022-11-15

## 2022-11-15 VITALS — HEIGHT: 66 IN | WEIGHT: 292 LBS | BODY MASS INDEX: 46.93 KG/M2

## 2022-11-15 DIAGNOSIS — M19.011 PRIMARY OSTEOARTHRITIS OF RIGHT SHOULDER: Primary | ICD-10-CM

## 2022-11-15 DIAGNOSIS — E66.01 MORBID OBESITY (H): Chronic | ICD-10-CM

## 2022-11-15 DIAGNOSIS — M25.511 RIGHT SHOULDER PAIN: ICD-10-CM

## 2022-11-15 DIAGNOSIS — M06.9 RHEUMATOID ARTHRITIS, INVOLVING UNSPECIFIED SITE, UNSPECIFIED WHETHER RHEUMATOID FACTOR PRESENT (H): ICD-10-CM

## 2022-11-15 PROCEDURE — 73030 X-RAY EXAM OF SHOULDER: CPT | Mod: RT | Performed by: RADIOLOGY

## 2022-11-15 PROCEDURE — 99213 OFFICE O/P EST LOW 20 MIN: CPT | Performed by: FAMILY MEDICINE

## 2022-11-15 NOTE — PROGRESS NOTES
"Sports Medicine Clinic Visit    PCP: Anjel Busby Michael is a 62 year old female who is seen  as self referral presenting with right shoulder pain. Patient points to her anterior shoulder as source of pain but will report pain will radiate throughout her shoulder joint.  Patient indicates that she has had right shoulder discomfort with pushing herself up from a chair and with overhead reaching or putting on clothing, at least for 3 weeks.  She believes it may have been going on for longer than that.    She is currently on Coumadin, and has an upcoming INR pending.    She denies tingling, numbing or burning sensation in her right upper extremity     Injury: None    Location of Pain: right shoulder  Duration of Pain: 3 week(s)  Rating of Pain: 8/10  Pain is better with: Ibuprofen  Pain is worse with:using her arms to get up from a seated position, lifting overhead and getting dressed.   Additional Features: she is right hand dominant.   Treatment so far consists of: Ibuprofen and Rest  Prior History of related problems: None    Ht 1.676 m (5' 6\")   Wt 132.5 kg (292 lb)   BMI 47.13 kg/m        Patient had an EMG of the upper extremity 1/10/2017 consistent with severe right-sided and moderate left-sided median neuropathy    Most recent rheumatology visit Dr. Toan Austin, 9/29/2022 reviewed by me.  History of inflammatory arthritis, seropositive, in remission with no immunomodulatory medicine since early 2022.  Plan was to repeat serum studies over time, and remain off of methotrexate.    Patient has a past history of  obstructive sleep apnea, and blood clot (2019)    status post a right total hip replacement 2000 Dr. Gonzalez  status post a left total knee replacement 2012 Dr. Quesada      Medicines include Cymbalta, Abilify, coumadin, lisinopril          4 views right shoulder radiographs 11/15/2022 11:28 AM     History: Right shoulder pain     Comparison: None available.     Findings:     AP 2, " Grashey, transscapular Y, axillary  views of the right shoulder  were obtained. Axillary view is suboptimal due to underpenetration.     No acute osseous abnormality. Inferior subluxation of the humeral head  relative to glenoid. Acromioclavicular joint is congruent.     Os acromiale. Moderate degenerative changes of the acromioclavicular  joint. Moderate to severe degenerative change of the glenohumeral  joint. Contour irregularity of the fifth and sixth ribs, likely from  prior trauma. Soft tissue is unremarkable. The visualized lung is  clear.                                                                      Impression:  1. No acute osseous abnormality.  2. Inferior subluxation of the humeral head relative to glenoid with  severe glenohumeral joint osteoarthrosis.  3. Os acromiale with moderate degenerative changes of  acromioclavicular joint.     [facility]             MRI CERVICAL SPINE WITHOUT CONTRAST  12/22/2016 10:53 AM      HISTORY: Neck pain radiates to both arms.     TECHNIQUE: Multiplanar, multisequence MRI of the cervical spine  without contrast.      COMPARISON: None.     FINDINGS: The cervical spinal cord and visualized portions of the  thoracic spinal cord appear normal.  The visualized portions of the  brainstem and cerebellum appear normal.        Alignment: Normal.       Craniocervical Junction and C1-C2:  Normal.     C2-C3:  Normal disc, facet joints, spinal canal and neural foramina.     C3-C4:  Normal disc, facet joints, spinal canal and neural foramina.     C4-C5:  Left posterolateral chronic-appearing disc protrusion with  adjacent osteophytes. Mild loss of disc height. Normal neural foramina  and facet joints.     C5-C6:  Loss of disc height, mild circumferential disc bulge and  vertebral endplate osteophytes. Mild spinal canal stenosis and mild  bilateral foraminal stenosis. Normal facet joints.     C6-C7:  Loss of disc height, mild circumferential disc bulge and  vertebral  endplate osteophytes. Slight impression on the thecal sac.  Normal neural foramina and facet joints.     C7-T1: Normal disc, facet joints, spinal canal and neural foramina.     T1-T2:  Normal disc, facet joints, spinal canal and neural foramina.      T2-T3:  Normal disc, facet joints, spinal canal and neural foramina.     Paraspinous Soft Tissues:  Normal as visualized.     Bone Marrow:  Normal signal intensity.                                                                      IMPRESSION:  Mild degenerative disc disease C4-C5 and moderate  degenerative disc disease C5-C6 and C6-C7.         EMG 1/10/2017:  Interpretation:  This is an abnormal study. There is electrophysiologic evidence of a severe right-sided and a moderate left-sided median neuropathy at the wrist (e.g., bilateral carpal tunnel syndrome). Clinical correlation is recommended.          PMH:  Past Medical History:   Diagnosis Date     Chronic bronchitis (H) 07/30/2015     Contact dermatitis      Depressive disorder 1985     Eczema     HANDS     Elevated liver enzymes      H/O total knee replacement, left      History of total hip replacement 10/27/2011     Hyperlipidemia      Hypertension      Morbid obesity (H)      Osteoarthrosis     right knee     RA (rheumatoid arthritis) (H) 8/2018     Sleep apnea      Tobacco use disorder      Varicella 1965       Active problem list:  Patient Active Problem List   Diagnosis     Primary localized osteoarthrosis, pelvic region and thigh     Adjustment disorder with depressed mood     Tobacco use disorder     Morbid obesity (H)     Arthritis of knee, degenerative     Degeneration of cervical intervertebral disc     CYRUS (obstructive sleep apnea)     Elevated blood pressure (not hypertension)     Pulmonary emboli (H)     Rheumatoid arthritis (H)     Cardiac arrest (H)     Long term current use of anticoagulant therapy       FH:  Family History   Problem Relation Age of Onset     Thyroid Disease Mother       Hypertension Mother      Skin Cancer Mother         MMohs surgery with skin graft     Cerebrovascular Disease Mother         CVA after endarderectomy-      Diabetes Mother      Depression Mother      Alcoholism Father      Heart Disease Father      Substance Abuse Father              Heart Disease Sister         multiple ablations     Alcoholism Sister      Substance Abuse Sister         Sober     Depression Sister      Substance Abuse Sister         Sober 30 plus years     Depression Sister      Thyroid Disease Sister      Hypertension Maternal Grandmother      Depression Maternal Grandmother      Breast Cancer Paternal Grandmother      Pancreatic Cancer Paternal Grandmother      Prostate Cancer Paternal Grandfather      Cardiovascular Paternal Aunt      Cardiovascular Paternal Uncle      Depression Cousin      Depression Other        SH:  Social History     Socioeconomic History     Marital status: Single     Spouse name: Not on file     Number of children: Not on file     Years of education: Not on file     Highest education level: Not on file   Occupational History     Occupation: registered nurse     Comment: [employer]   Tobacco Use     Smoking status: Former     Packs/day: 0.00     Years: 33.00     Pack years: 0.00     Types: Cigarettes     Start date: 1982     Quit date: 2019     Years since quitting: 3.1     Smokeless tobacco: Never   Vaping Use     Vaping Use: Never used   Substance and Sexual Activity     Alcohol use: Yes     Comment: Rarely     Drug use: No     Sexual activity: Not Currently     Birth control/protection: Abstinence, Post-menopausal   Other Topics Concern     Parent/sibling w/ CABG, MI or angioplasty before 65F 55M? Not Asked   Social History Narrative     Not on file     Social Determinants of Health     Financial Resource Strain: Not on file   Food Insecurity: Not on file   Transportation Needs: Not on file   Physical Activity: Not on file   Stress: Not on  file   Social Connections: Not on file   Intimate Partner Violence: Not on file   Housing Stability: Not on file       MEDS:  See EMR, reviewed  ALL:  See EMR, reviewed    REVIEW OF SYSTEMS:  CONSTITUTIONAL:NEGATIVE for fever, chills, change in weight  INTEGUMENTARY/SKIN: NEGATIVE for worrisome rashes, moles or lesions  EYES: NEGATIVE for vision changes or irritation  ENT/MOUTH: NEGATIVE for ear, mouth and throat problems  RESP:NEGATIVE for significant cough or SOB  BREAST: NEGATIVE for masses, tenderness or discharge  CV: NEGATIVE for chest pain, palpitations or peripheral edema  GI: NEGATIVE for nausea, abdominal pain, heartburn, or change in bowel habits  :NEGATIVE for frequency, dysuria, or hematuria  :NEGATIVE for frequency, dysuria, or hematuria  NEURO: NEGATIVE for weakness, dizziness or paresthesias  ENDOCRINE: NEGATIVE for temperature intolerance, skin/hair changes  HEME/ALLERGY/IMMUNE: NEGATIVE for bleeding problems  PSYCHIATRIC: NEGATIVE for changes in mood or affect      Objective: She has limited range of motion at the right shoulder to internal rotation and external rotation and forward flexion.  She has about a 50 to 60 degree arc of shoulder movements allowed before she reaches an endpoint.  There is crepitance noted.  She has 5 out of 5 strength bilaterally at deltoid, supraspinatus, infraspinatus and subscapularis with no shoulder weakness noted.  There is no redness or swelling about the shoulder joint.  No palpable intra-articular effusion.  Nontender of the AC joint anterior cuff or biceps tendon.    I personally reviewed with the patient x-rays of the right shoulder that show severe glenohumeral DJD with associated inferior subluxation of the humeral head with respect to the glenoid.  Moderate AC joint DJD..  I can find no previous shoulder x-rays to compare to.    Assessment: Right-sided shoulder severe glenohumeral DJD    Plan: She is using Tylenol for discomfort.  She avoids nonsteroidal  anti-inflammatories because of her Coumadin use.  She understands its best to avoid narcotics in the setting of chronic arthritis.  We discussed the option of specialist referral to discuss shoulder replacement surgery options, declined by the patient.  She would like to try an intra-articular cortisone injection either under x-ray or ultrasound guidance.  Referral placed.

## 2022-11-15 NOTE — LETTER
"  11/15/2022      RE: Shira Rodriguez  [id number]  [place] [address]     Dear Colleague,    Thank you for referring your patient, Shira Rodriguez, to the [facility] [place]. Please see a copy of my visit note below.    Sports Medicine Clinic Visit    PCP: Anjel Busby    Shira Rodriguez is a 62 year old female who is seen  as self referral presenting with right shoulder pain. Patient points to her anterior shoulder as source of pain but will report pain will radiate throughout her shoulder joint.  Patient indicates that she has had right shoulder discomfort with pushing herself up from a chair and with overhead reaching or putting on clothing, at least for 3 weeks.  She believes it may have been going on for longer than that.    She is currently on Coumadin, and has an upcoming INR pending.    She denies tingling, numbing or burning sensation in her right upper extremity     Injury: None    Location of Pain: right shoulder  Duration of Pain: 3 week(s)  Rating of Pain: 8/10  Pain is better with: Ibuprofen  Pain is worse with:using her arms to get up from a seated position, lifting overhead and getting dressed.   Additional Features: she is right hand dominant.   Treatment so far consists of: Ibuprofen and Rest  Prior History of related problems: None    Ht 1.676 m (5' 6\")   Wt 132.5 kg (292 lb)   BMI 47.13 kg/m        Patient had an EMG of the upper extremity 1/10/2017 consistent with severe right-sided and moderate left-sided median neuropathy    Most recent rheumatology visit Dr. Toan Austin, 9/29/2022 reviewed by me.  History of inflammatory arthritis, seropositive, in remission with no immunomodulatory medicine since early 2022.  Plan was to repeat serum studies over time, and remain off of methotrexate.    Patient has a past history of  obstructive sleep apnea, and blood clot (2019)    status post a right total hip replacement 2000  " Gonzalez  status post a left total knee replacement 2012 Dr. Quesada      Medicines include Cymbalta, Abilify, coumadin, lisinopril          4 views right shoulder radiographs 11/15/2022 11:28 AM     History: Right shoulder pain     Comparison: None available.     Findings:     AP 2, Grashey, transscapular Y, axillary  views of the right shoulder  were obtained. Axillary view is suboptimal due to underpenetration.     No acute osseous abnormality. Inferior subluxation of the humeral head  relative to glenoid. Acromioclavicular joint is congruent.     Os acromiale. Moderate degenerative changes of the acromioclavicular  joint. Moderate to severe degenerative change of the glenohumeral  joint. Contour irregularity of the fifth and sixth ribs, likely from  prior trauma. Soft tissue is unremarkable. The visualized lung is  clear.                                                                      Impression:  1. No acute osseous abnormality.  2. Inferior subluxation of the humeral head relative to glenoid with  severe glenohumeral joint osteoarthrosis.  3. Os acromiale with moderate degenerative changes of  acromioclavicular joint.     [facility]             MRI CERVICAL SPINE WITHOUT CONTRAST  12/22/2016 10:53 AM      HISTORY: Neck pain radiates to both arms.     TECHNIQUE: Multiplanar, multisequence MRI of the cervical spine  without contrast.      COMPARISON: None.     FINDINGS: The cervical spinal cord and visualized portions of the  thoracic spinal cord appear normal.  The visualized portions of the  brainstem and cerebellum appear normal.        Alignment: Normal.       Craniocervical Junction and C1-C2:  Normal.     C2-C3:  Normal disc, facet joints, spinal canal and neural foramina.     C3-C4:  Normal disc, facet joints, spinal canal and neural foramina.     C4-C5:  Left posterolateral chronic-appearing disc protrusion with  adjacent osteophytes. Mild loss of disc height. Normal neural foramina  and facet  joints.     C5-C6:  Loss of disc height, mild circumferential disc bulge and  vertebral endplate osteophytes. Mild spinal canal stenosis and mild  bilateral foraminal stenosis. Normal facet joints.     C6-C7:  Loss of disc height, mild circumferential disc bulge and  vertebral endplate osteophytes. Slight impression on the thecal sac.  Normal neural foramina and facet joints.     C7-T1: Normal disc, facet joints, spinal canal and neural foramina.     T1-T2:  Normal disc, facet joints, spinal canal and neural foramina.      T2-T3:  Normal disc, facet joints, spinal canal and neural foramina.     Paraspinous Soft Tissues:  Normal as visualized.     Bone Marrow:  Normal signal intensity.                                                                      IMPRESSION:  Mild degenerative disc disease C4-C5 and moderate  degenerative disc disease C5-C6 and C6-C7.         EMG 1/10/2017:  Interpretation:  This is an abnormal study. There is electrophysiologic evidence of a severe right-sided and a moderate left-sided median neuropathy at the wrist (e.g., bilateral carpal tunnel syndrome). Clinical correlation is recommended.          PMH:  Past Medical History:   Diagnosis Date     Chronic bronchitis (H) 07/30/2015     Contact dermatitis      Depressive disorder 1985     Eczema     HANDS     Elevated liver enzymes      H/O total knee replacement, left      History of total hip replacement 10/27/2011     Hyperlipidemia      Hypertension      Morbid obesity (H)      Osteoarthrosis     right knee     RA (rheumatoid arthritis) (H) 8/2018     Sleep apnea      Tobacco use disorder      Varicella 1965       Active problem list:  Patient Active Problem List   Diagnosis     Primary localized osteoarthrosis, pelvic region and thigh     Adjustment disorder with depressed mood     Tobacco use disorder     Morbid obesity (H)     Arthritis of knee, degenerative     Degeneration of cervical intervertebral disc     CYRUS (obstructive sleep  apnea)     Elevated blood pressure (not hypertension)     Pulmonary emboli (H)     Rheumatoid arthritis (H)     Cardiac arrest (H)     Long term current use of anticoagulant therapy       FH:  Family History   Problem Relation Age of Onset     Thyroid Disease Mother      Hypertension Mother      Skin Cancer Mother         MMohs surgery with skin graft     Cerebrovascular Disease Mother         CVA after endarderectomy-      Diabetes Mother      Depression Mother      Alcoholism Father      Heart Disease Father      Substance Abuse Father              Heart Disease Sister         multiple ablations     Alcoholism Sister      Substance Abuse Sister         Sober     Depression Sister      Substance Abuse Sister         Sober 30 plus years     Depression Sister      Thyroid Disease Sister      Hypertension Maternal Grandmother      Depression Maternal Grandmother      Breast Cancer Paternal Grandmother      Pancreatic Cancer Paternal Grandmother      Prostate Cancer Paternal Grandfather      Cardiovascular Paternal Aunt      Cardiovascular Paternal Uncle      Depression Cousin      Depression Other        SH:  Social History     Socioeconomic History     Marital status: Single     Spouse name: Not on file     Number of children: Not on file     Years of education: Not on file     Highest education level: Not on file   Occupational History     Occupation: registered nurse     Comment: [employer]   Tobacco Use     Smoking status: Former     Packs/day: 0.00     Years: 33.00     Pack years: 0.00     Types: Cigarettes     Start date: 1982     Quit date: 2019     Years since quitting: 3.1     Smokeless tobacco: Never   Vaping Use     Vaping Use: Never used   Substance and Sexual Activity     Alcohol use: Yes     Comment: Rarely     Drug use: No     Sexual activity: Not Currently     Birth control/protection: Abstinence, Post-menopausal   Other Topics Concern     Parent/sibling w/ CABG, MI or  angioplasty before 65F 55M? Not Asked   Social History Narrative     Not on file     Social Determinants of Health     Financial Resource Strain: Not on file   Food Insecurity: Not on file   Transportation Needs: Not on file   Physical Activity: Not on file   Stress: Not on file   Social Connections: Not on file   Intimate Partner Violence: Not on file   Housing Stability: Not on file       MEDS:  See EMR, reviewed  ALL:  See EMR, reviewed    REVIEW OF SYSTEMS:  CONSTITUTIONAL:NEGATIVE for fever, chills, change in weight  INTEGUMENTARY/SKIN: NEGATIVE for worrisome rashes, moles or lesions  EYES: NEGATIVE for vision changes or irritation  ENT/MOUTH: NEGATIVE for ear, mouth and throat problems  RESP:NEGATIVE for significant cough or SOB  BREAST: NEGATIVE for masses, tenderness or discharge  CV: NEGATIVE for chest pain, palpitations or peripheral edema  GI: NEGATIVE for nausea, abdominal pain, heartburn, or change in bowel habits  :NEGATIVE for frequency, dysuria, or hematuria  :NEGATIVE for frequency, dysuria, or hematuria  NEURO: NEGATIVE for weakness, dizziness or paresthesias  ENDOCRINE: NEGATIVE for temperature intolerance, skin/hair changes  HEME/ALLERGY/IMMUNE: NEGATIVE for bleeding problems  PSYCHIATRIC: NEGATIVE for changes in mood or affect      Objective: She has limited range of motion at the right shoulder to internal rotation and external rotation and forward flexion.  She has about a 50 to 60 degree arc of shoulder movements allowed before she reaches an endpoint.  There is crepitance noted.  She has 5 out of 5 strength bilaterally at deltoid, supraspinatus, infraspinatus and subscapularis with no shoulder weakness noted.  There is no redness or swelling about the shoulder joint.  No palpable intra-articular effusion.  Nontender of the AC joint anterior cuff or biceps tendon.    I personally reviewed with the patient x-rays of the right shoulder that show severe glenohumeral DJD with associated  inferior subluxation of the humeral head with respect to the glenoid.  Moderate AC joint DJD..  I can find no previous shoulder x-rays to compare to.    Assessment: Right-sided shoulder severe glenohumeral DJD    Plan: She is using Tylenol for discomfort.  She avoids nonsteroidal anti-inflammatories because of her Coumadin use.  She understands its best to avoid narcotics in the setting of chronic arthritis.  We discussed the option of specialist referral to discuss shoulder replacement surgery options, declined by the patient.  She would like to try an intra-articular cortisone injection either under x-ray or ultrasound guidance.  Referral placed.  Again, thank you for allowing me to participate in the care of your patient.      Sincerely,    Jac Mata MD

## 2022-11-18 ENCOUNTER — ANTICOAGULATION THERAPY VISIT (OUTPATIENT)
Dept: ANTICOAGULATION | Facility: CLINIC | Age: 62
End: 2022-11-18

## 2022-11-18 ENCOUNTER — LAB (OUTPATIENT)
Dept: LAB | Facility: CLINIC | Age: 62
End: 2022-11-18
Payer: COMMERCIAL

## 2022-11-18 DIAGNOSIS — I26.99 PULMONARY EMBOLI (H): Primary | ICD-10-CM

## 2022-11-18 DIAGNOSIS — Z79.01 LONG TERM CURRENT USE OF ANTICOAGULANT THERAPY: ICD-10-CM

## 2022-11-18 DIAGNOSIS — I26.99 PULMONARY EMBOLI (H): ICD-10-CM

## 2022-11-18 LAB — INR BLD: 6.5 (ref 0.9–1.1)

## 2022-11-18 PROCEDURE — 85610 PROTHROMBIN TIME: CPT

## 2022-11-18 PROCEDURE — 36416 COLLJ CAPILLARY BLOOD SPEC: CPT

## 2022-11-18 PROCEDURE — 36415 COLL VENOUS BLD VENIPUNCTURE: CPT

## 2022-11-18 NOTE — PROGRESS NOTES
ANTICOAGULATION MANAGEMENT     Shira Rodriguez 62 year old female is on warfarin with supratherapeutic INR result. (Goal INR 2.0-3.0)    Recent labs: (last 7 days)     11/18/22  1323   INR 6.5*       ASSESSMENT       Source(s): Patient/Caregiver Call       Warfarin doses taken: Warfarin taken as instructed    Diet: No new diet changes identified    New illness, injury, or hospitalization: Yes: patient is in a lot of pain in her right shoulder. Per chart review is scheduled for a steroid injection on 11/28    Medication/supplement changes: Taking Tylenol ES Q 4 hours for right shoulder pain.    Signs or symptoms of bleeding or clotting: No    Previous INR: Therapeutic last visit; previously outside of goal range    Additional findings: Patient reports taking her Warfarin this AM. Writer explained that it is best to wait to take Warfarin after testing. Writer also educated patient that eating some vitamin k rich food can help decrease her INR.        PLAN     Recommended plan for ongoing change(s) affecting INR     Dosing Instructions: hold 2 doses then decrease your warfarin dose (15.4% change) with next INR in 3 days       Summary  As of 11/18/2022    Full warfarin instructions:  11/19: Hold; 11/20: Hold; Otherwise 2.5 mg every Mon, Wed, Fri; 5 mg all other days; Starting 11/18/2022   Next INR check:  11/21/2022             Telephone call with Shira who verbalizes understanding and agrees to plan and who agrees to plan and repeated back plan correctly    Lab visit scheduled    Education provided:     Symptom monitoring: monitoring for bleeding signs and symptoms, when to seek medical attention/emergency care and if you hit your head or have a bad fall seek emergency care    Plan made per [facility] anticoagulation protocol    Patience Martins RN  Anticoagulation Clinic  11/18/2022    _______________________________________________________________________     Anticoagulation Episode Summary     Current INR goal:  2.0-3.0    TTR:  57.2 % (1 y)   Target end date:  Indefinite   Send INR reminders to:  [facility]    Indications    Pulmonary emboli (H) [I26.99]  Long term current use of anticoagulant therapy [Z79.01]           Comments:           Anticoagulation Care Providers     Provider Role Specialty Phone number    Anjel Busby MD Referring Family Medicine [phone]

## 2022-11-23 ENCOUNTER — LAB (OUTPATIENT)
Dept: LAB | Facility: CLINIC | Age: 62
End: 2022-11-23
Payer: COMMERCIAL

## 2022-11-23 ENCOUNTER — ANTICOAGULATION THERAPY VISIT (OUTPATIENT)
Dept: ANTICOAGULATION | Facility: CLINIC | Age: 62
End: 2022-11-23

## 2022-11-23 DIAGNOSIS — M05.79 RHEUMATOID ARTHRITIS INVOLVING MULTIPLE SITES WITH POSITIVE RHEUMATOID FACTOR (H): ICD-10-CM

## 2022-11-23 DIAGNOSIS — Z79.01 LONG TERM CURRENT USE OF ANTICOAGULANT THERAPY: ICD-10-CM

## 2022-11-23 DIAGNOSIS — M05.741 RHEUMATOID ARTHRITIS INVOLVING BOTH HANDS WITH POSITIVE RHEUMATOID FACTOR (H): ICD-10-CM

## 2022-11-23 DIAGNOSIS — I26.99 PULMONARY EMBOLI (H): ICD-10-CM

## 2022-11-23 DIAGNOSIS — M05.742 RHEUMATOID ARTHRITIS INVOLVING BOTH HANDS WITH POSITIVE RHEUMATOID FACTOR (H): ICD-10-CM

## 2022-11-23 DIAGNOSIS — I26.99 PULMONARY EMBOLI (H): Primary | ICD-10-CM

## 2022-11-23 LAB — INR BLD: 4.4 (ref 0.9–1.1)

## 2022-11-23 PROCEDURE — 85610 PROTHROMBIN TIME: CPT

## 2022-11-23 PROCEDURE — 36416 COLLJ CAPILLARY BLOOD SPEC: CPT

## 2022-11-23 NOTE — PROGRESS NOTES
Return call received from Shira. Discussed recommendations and recheck INR date. Confirmed understanding of new dosing recommendations. Lab appt scheduled. No signs of bleeding.     YUNIER DAVIDSON RN-BC, [facility]  Anticoagulation Clinic  [phone]

## 2022-11-23 NOTE — PROGRESS NOTES
ANTICOAGULATION MANAGEMENT     Shira Rodriguez 62 year old female is on warfarin with supratherapeutic INR result. (Goal INR 2.0-3.0)    Recent labs: (last 7 days)     11/23/22  1428   INR 4.4*       ASSESSMENT       Source(s): Chart Review       Warfarin doses taken: Reviewed in chart    Diet: No new diet changes identified    New illness, injury, or hospitalization: Yes: Patient is having terrible pain in shoulder and knee.    Medication/supplement changes: None noted    Signs or symptoms of bleeding or clotting: No    Previous INR: Supratherapeutic    Additional findings: Patient has a steroid injection scheduled for 11/28/22       PLAN     Recommended plan for ongoing change(s) affecting INR     Dosing Instructions: hold dose then decrease your warfarin dose (18% change) with next INR in 5 days       Summary  As of 11/23/2022    Full warfarin instructions:  11/23: Hold; Otherwise 5 mg every Mon, Thu; 2.5 mg all other days; Starting 11/23/2022   Next INR check:  11/28/2022             Detailed voice message left for Shira with dosing instructions and follow up date.     Check at provider office visit    Education provided:     Please call back if any changes to your diet, medications or how you've been taking warfarin    Contact [phone] with any changes, questions or concerns.     Plan made per [facility] anticoagulation protocol    Mariah Sellers RN  Anticoagulation Clinic  11/23/2022    _______________________________________________________________________     Anticoagulation Episode Summary     Current INR goal:  2.0-3.0   TTR:  55.8 % (1 y)   Target end date:  Indefinite   Send INR reminders to:  [facility]    Indications    Pulmonary emboli (H) [I26.99]  Long term current use of anticoagulant therapy [Z79.01]           Comments:           Anticoagulation Care Providers     Provider Role Specialty Phone number    Anjel Busby MD Referring Family Medicine [phone]

## 2022-11-28 ENCOUNTER — ANTICOAGULATION THERAPY VISIT (OUTPATIENT)
Dept: ANTICOAGULATION | Facility: CLINIC | Age: 62
End: 2022-11-28

## 2022-11-28 ENCOUNTER — OFFICE VISIT (OUTPATIENT)
Dept: ORTHOPEDICS | Facility: CLINIC | Age: 62
End: 2022-11-28
Payer: COMMERCIAL

## 2022-11-28 ENCOUNTER — LAB (OUTPATIENT)
Dept: LAB | Facility: CLINIC | Age: 62
End: 2022-11-28
Payer: COMMERCIAL

## 2022-11-28 DIAGNOSIS — I26.99 PULMONARY EMBOLI (H): ICD-10-CM

## 2022-11-28 DIAGNOSIS — M19.011 PRIMARY OSTEOARTHRITIS OF RIGHT SHOULDER: Primary | ICD-10-CM

## 2022-11-28 DIAGNOSIS — M17.11 LOCALIZED OSTEOARTHRITIS OF RIGHT KNEE: ICD-10-CM

## 2022-11-28 DIAGNOSIS — I26.99 PULMONARY EMBOLI (H): Primary | ICD-10-CM

## 2022-11-28 DIAGNOSIS — Z79.01 LONG TERM CURRENT USE OF ANTICOAGULANT THERAPY: ICD-10-CM

## 2022-11-28 LAB — INR BLD: 3.3 (ref 0.9–1.1)

## 2022-11-28 PROCEDURE — 20610 DRAIN/INJ JOINT/BURSA W/O US: CPT | Mod: 59 | Performed by: FAMILY MEDICINE

## 2022-11-28 PROCEDURE — 36415 COLL VENOUS BLD VENIPUNCTURE: CPT | Performed by: PATHOLOGY

## 2022-11-28 PROCEDURE — 85610 PROTHROMBIN TIME: CPT | Performed by: PATHOLOGY

## 2022-11-28 PROCEDURE — 20611 DRAIN/INJ JOINT/BURSA W/US: CPT | Mod: RT | Performed by: FAMILY MEDICINE

## 2022-11-28 RX ORDER — LIDOCAINE HYDROCHLORIDE 10 MG/ML
2 INJECTION, SOLUTION EPIDURAL; INFILTRATION; INTRACAUDAL; PERINEURAL
Status: DISCONTINUED | OUTPATIENT
Start: 2022-11-28 | End: 2023-02-21

## 2022-11-28 RX ORDER — TRIAMCINOLONE ACETONIDE 40 MG/ML
40 INJECTION, SUSPENSION INTRA-ARTICULAR; INTRAMUSCULAR
Status: DISCONTINUED | OUTPATIENT
Start: 2022-11-28 | End: 2023-02-21

## 2022-11-28 RX ORDER — LIDOCAINE HYDROCHLORIDE 10 MG/ML
4 INJECTION, SOLUTION EPIDURAL; INFILTRATION; INTRACAUDAL; PERINEURAL
Status: DISCONTINUED | OUTPATIENT
Start: 2022-11-28 | End: 2023-02-21

## 2022-11-28 RX ORDER — LIDOCAINE HYDROCHLORIDE 10 MG/ML
3 INJECTION, SOLUTION EPIDURAL; INFILTRATION; INTRACAUDAL; PERINEURAL
Status: DISCONTINUED | OUTPATIENT
Start: 2022-11-28 | End: 2023-02-21

## 2022-11-28 RX ADMIN — TRIAMCINOLONE ACETONIDE 40 MG: 40 INJECTION, SUSPENSION INTRA-ARTICULAR; INTRAMUSCULAR at 09:23

## 2022-11-28 RX ADMIN — LIDOCAINE HYDROCHLORIDE 2 ML: 10 INJECTION, SOLUTION EPIDURAL; INFILTRATION; INTRACAUDAL; PERINEURAL at 09:23

## 2022-11-28 RX ADMIN — LIDOCAINE HYDROCHLORIDE 4 ML: 10 INJECTION, SOLUTION EPIDURAL; INFILTRATION; INTRACAUDAL; PERINEURAL at 09:23

## 2022-11-28 RX ADMIN — LIDOCAINE HYDROCHLORIDE 3 ML: 10 INJECTION, SOLUTION EPIDURAL; INFILTRATION; INTRACAUDAL; PERINEURAL at 09:23

## 2022-11-28 NOTE — NURSING NOTE
[address] [address]  Dept: [phone]  ______________________________________________________________________________    Patient: Shira Rodriguez   : 1960   MRN: [id number]   2022    INVASIVE PROCEDURE SAFETY CHECKLIST    Date: 2022   Procedure:R GH USG CSI and R knee CSI   Patient Name: Shira Rodriguez  MRN: [id number]  YOB: 1960    Action: Complete sections as appropriate. Any discrepancy results in a HARD COPY until resolved.     PRE PROCEDURE:  Patient ID verified with 2 identifiers (name and  or MRN): Yes  Procedure and site verified with patient/designee (when able): Yes  Accurate consent documentation in medical record: Yes  H&P (or appropriate assessment) documented in medical record: Yes  H&P must be up to 20 days prior to procedure and updates within 24 hours of procedure as applicable: Yes  Relevant diagnostic and radiology test results appropriately labeled and displayed as applicable: Yes  Procedure site(s) marked with provider initials: NA    TIMEOUT:  Time-Out performed immediately prior to starting procedure, including verbal and active participation of all team members addressing the following:Yes  * Correct patient identify  * Confirmed that the correct side and site are marked  * An accurate procedure consent form  * Agreement on the procedure to be done  * Correct patient position  * Relevant images and results are properly labeled and appropriately displayed  * The need to administer antibiotics or fluids for irrigation purposes during the procedure as applicable   * Safety precautions based on patient history or medication use    DURING PROCEDURE: Verification of correct person, site, and procedures any time the responsibility for care of the patient is transferred to another member of the care team.       Prior to injection, verified patient identity using patient's name  and date of birth.  Due to injection administration, patient instructed to remain in clinic for 15 minutes  afterwards, and to report any adverse reaction to me immediately.    Joint injection was performed.      Drug Amount Wasted:  None.  Vial/Syringe: Single dose vial  Expiration Date:  8/1/25 8/1/24      Alpa Spring, [facility]  November 28, 2022

## 2022-11-28 NOTE — PROGRESS NOTES
ANTICOAGULATION MANAGEMENT     Shira Rodriguez 62 year old female is on warfarin with supratherapeutic INR result. (Goal INR 2.0-3.0)    Recent labs: (last 7 days)     11/28/22  0816   INR 3.3*       ASSESSMENT       Source(s): Chart Review and Patient/Caregiver Call       Warfarin doses taken: Held for supratherapeutic  recently which may be affecting INR    Diet: No new diet changes identified    New illness, injury, or hospitalization: Yes: ongoing joint pain-received an injection today    Medication/supplement changes: None noted    Signs or symptoms of bleeding or clotting: No    Previous INR: Supratherapeutic    Additional findings: None       PLAN     Recommended plan for ongoing change(s) affecting INR     Dosing Instructions: decrease your warfarin dose (11.1% change) with next INR in 2 weeks       Summary  As of 11/28/2022    Full warfarin instructions:  5 mg every Thu; 2.5 mg all other days; Starting 11/28/2022   Next INR check:  12/12/2022             Telephone call with Shira who verbalizes understanding and agrees to plan and who agrees to plan and repeated back plan correctly    Lab visit scheduled    Education provided:     Symptom monitoring: monitoring for bleeding signs and symptoms    Contact [phone] with any changes, questions or concerns.     Plan made per [facility] anticoagulation protocol    YUNIER DAVIDSON RN  Anticoagulation Clinic  11/28/2022    _______________________________________________________________________     Anticoagulation Episode Summary     Current INR goal:  2.0-3.0   TTR:  54.5 % (1 y)   Target end date:  Indefinite   Send INR reminders to:  [facility]    Indications    Pulmonary emboli (H) [I26.99]  Long term current use of anticoagulant therapy [Z79.01]           Comments:           Anticoagulation Care Providers     Provider Role Specialty Phone number    Anjel Busby MD Referring Family Medicine [phone]

## 2022-11-28 NOTE — PROGRESS NOTES
[facility]  SPORTS & ORTHOPEDIC CLINIC VISIT     Nov 28, 2022              Ultrasound-guided right glenohumeral joint injection    Date/Time: 11/28/2022 9:23 AM  Performed by: Gamaliel Duque MD  Authorized by: Gamaliel Duque MD     Indications:  Osteoarthritis  Needle Size:  22 G  Guidance: ultrasound    Approach:  Posterolateral  Location:  Shoulder      Site:  R glenohumeral joint  Medications:  40 mg triamcinolone 40 MG/ML; 3 mL lidocaine (PF) 1 %; 2 mL lidocaine (PF) 1 %  Outcome:  Tolerated well, no immediate complications  Procedure discussed: discussed risks, benefits, and alternatives    Consent Given by:  Patient  Timeout: timeout called immediately prior to procedure    Prep: patient was prepped and draped in usual sterile fashion     PROCEDURE: Ultrasound Guided Right Glehohumeral Injection   The patient was apprised of the risks and the benefits of the procedure written consent was signed by the patient.   The patient was positioned seated in a chair.  The posterior glenohumeral joint was identified with ultrasound and marked with a pen.  Ultrasound was utilized to ensure proper placement of medication into the glenohumeral joint clear of surrounding neurovascular structures.  This area was then cleaned with a chlorhexidine swab.  Anesthesia of the skin was obtained with 3mL 1% lidocaine.  Next using direct and continuous ultrasound guidance with sterile technique a 22-gauge needle was introduced into the glenohumeral joint and a solution of 40 mg triamcinolone and 2mL 1% lidocaine was injected and seen flowing into the joint space.  Images were captured and saved to the permanent record.  The patient tolerated the procedure well and there were no immediate complications.  Patient was instructed to ice the shoulder upon leaving the clinic and refrain from overuse for the next 2 days.  Follow-up promptly for any increase in pain, swelling, redness or warmth  from the injection site.  Otherwise routine postinjection instructions were given.    Gamaliel Duque MD    Large Joint Injection/Arthocentesis: R knee joint    Date/Time: 11/28/2022 9:23 AM  Performed by: Gamaliel Duque MD  Authorized by: Gamaliel Duque MD     Indications:  Osteoarthritis  Needle Size:  22 G  Guidance: landmark guided    Approach:  Anterolateral  Location:  Knee      Medications:  40 mg triamcinolone 40 MG/ML; 4 mL lidocaine (PF) 1 %  Outcome:  Tolerated well, no immediate complications  Procedure discussed: discussed risks, benefits, and alternatives    Consent Given by:  Patient  Timeout: timeout called immediately prior to procedure    Prep: patient was prepped and draped in usual sterile fashion       There were no complications. The patient tolerated the procedure well. There was minimal bleeding.   The patient was instructed to ice the knee upon leaving clinic and refrain from overuse over the next 2 days.   The patient was instructed to go to the emergency room with any unusual pain, swelling, or redness occurred in the injected area.     Gamaliel Duque MD

## 2022-11-28 NOTE — LETTER
11/28/2022      RE: Shira Rodriguez  [address]  [place] [address]     Dear Colleague,    Thank you for referring your patient, Shira Rodriguez, to the [facility]ICINE [facility] [place]. Please see a copy of my visit note below.      [facility]  SPORTS & ORTHOPEDIC CLINIC VISIT     Nov 28, 2022              Ultrasound-guided right glenohumeral joint injection    Date/Time: 11/28/2022 9:23 AM  Performed by: Gamaliel Duque MD  Authorized by: Gamaliel Duque MD     Indications:  Osteoarthritis  Needle Size:  22 G  Guidance: ultrasound    Approach:  Posterolateral  Location:  Shoulder      Site:  R glenohumeral joint  Medications:  40 mg triamcinolone 40 MG/ML; 3 mL lidocaine (PF) 1 %; 2 mL lidocaine (PF) 1 %  Outcome:  Tolerated well, no immediate complications  Procedure discussed: discussed risks, benefits, and alternatives    Consent Given by:  Patient  Timeout: timeout called immediately prior to procedure    Prep: patient was prepped and draped in usual sterile fashion     PROCEDURE: Ultrasound Guided Right Glehohumeral Injection   The patient was apprised of the risks and the benefits of the procedure written consent was signed by the patient.   The patient was positioned seated in a chair.  The posterior glenohumeral joint was identified with ultrasound and marked with a pen.  Ultrasound was utilized to ensure proper placement of medication into the glenohumeral joint clear of surrounding neurovascular structures.  This area was then cleaned with a chlorhexidine swab.  Anesthesia of the skin was obtained with 3mL 1% lidocaine.  Next using direct and continuous ultrasound guidance with sterile technique a 22-gauge needle was introduced into the glenohumeral joint and a solution of 40 mg triamcinolone and 2mL 1% lidocaine was injected and seen flowing into the joint space.  Images were captured and saved to the [facility]  record.  The patient tolerated the procedure well and there were no immediate complications.  Patient was instructed to ice the shoulder upon leaving the clinic and refrain from overuse for the next 2 days.  Follow-up promptly for any increase in pain, swelling, redness or warmth from the injection site.  Otherwise routine postinjection instructions were given.    Gamaliel Duque MD    Large Joint Injection/Arthocentesis: R knee joint    Date/Time: 11/28/2022 9:23 AM  Performed by: Gamaliel Duque MD  Authorized by: Gamalile Duque MD     Indications:  Osteoarthritis  Needle Size:  22 G  Guidance: landmark guided    Approach:  Anterolateral  Location:  Knee      Medications:  40 mg triamcinolone 40 MG/ML; 4 mL lidocaine (PF) 1 %  Outcome:  Tolerated well, no immediate complications  Procedure discussed: discussed risks, benefits, and alternatives    Consent Given by:  Patient  Timeout: timeout called immediately prior to procedure    Prep: patient was prepped and draped in usual sterile fashion       There were no complications. The patient tolerated the procedure well. There was minimal bleeding.   The patient was instructed to ice the knee upon leaving clinic and refrain from overuse over the next 2 days.   The patient was instructed to go to the emergency room with any unusual pain, swelling, or redness occurred in the injected area.     Gamaliel Duque MD

## 2022-11-29 NOTE — TELEPHONE ENCOUNTER
methotrexate 50 MG/2ML injection         NOT ON MEDICATION LIST    Last Office Visit : 9-  Future Office visit:  3-    Routing refill request to provider for review/approval because:  NOT ON MEDICATION LIST      Kathleen M Doege RN

## 2022-11-30 RX ORDER — PEN NEEDLE, DIABETIC 29 G X1/2"
NEEDLE, DISPOSABLE MISCELLANEOUS
Qty: 12 EACH | Refills: 3 | Status: SHIPPED | OUTPATIENT
Start: 2022-11-30 | End: 2023-10-26

## 2022-11-30 RX ORDER — METHOTREXATE 25 MG/ML
25 INJECTION, SOLUTION INTRA-ARTERIAL; INTRAMUSCULAR; INTRAVENOUS
Qty: 12 ML | Refills: 5 | Status: SHIPPED | OUTPATIENT
Start: 2022-11-30 | End: 2023-01-29

## 2022-11-30 NOTE — TELEPHONE ENCOUNTER
Called patient in response to the refill request for subcutaneous methotrexate and syringes.  Patient states she stopped taking it around March/April 2022.  Was taking 25mg subcutaneous weekly.  Had some labs done 10/24/22 and would now like to restart methotrexate, but would prefer the oral form.  States she recently had her shoulder and knee injected due to pain.  Denies any pain in the hands, wrists, or fingers. Denies any swelling, stiffness, or difficulties walking.  Still with significant fatigue.  RX is pended for oral methotrexate.  Please discontinue the subcutaneous version and syringes if you want to proceed with the oral.  Patient is also due for monitoring labs, with the last being 5/2022. Orders for those are pended as well.     Arina Allison RN

## 2022-12-06 ENCOUNTER — OFFICE VISIT (OUTPATIENT)
Dept: FAMILY MEDICINE | Facility: CLINIC | Age: 62
End: 2022-12-06
Payer: COMMERCIAL

## 2022-12-06 ENCOUNTER — TELEPHONE (OUTPATIENT)
Dept: INTERNAL MEDICINE | Facility: CLINIC | Age: 62
End: 2022-12-06

## 2022-12-06 ENCOUNTER — LAB (OUTPATIENT)
Dept: LAB | Facility: CLINIC | Age: 62
End: 2022-12-06
Payer: COMMERCIAL

## 2022-12-06 ENCOUNTER — TELEPHONE (OUTPATIENT)
Dept: ANTICOAGULATION | Facility: CLINIC | Age: 62
End: 2022-12-06

## 2022-12-06 ENCOUNTER — ANCILLARY PROCEDURE (OUTPATIENT)
Dept: GENERAL RADIOLOGY | Facility: CLINIC | Age: 62
End: 2022-12-06
Attending: FAMILY MEDICINE
Payer: COMMERCIAL

## 2022-12-06 VITALS
HEART RATE: 121 BPM | HEIGHT: 66 IN | SYSTOLIC BLOOD PRESSURE: 134 MMHG | BODY MASS INDEX: 45.8 KG/M2 | DIASTOLIC BLOOD PRESSURE: 77 MMHG | WEIGHT: 285 LBS | OXYGEN SATURATION: 95 %

## 2022-12-06 DIAGNOSIS — R53.83 FATIGUE: ICD-10-CM

## 2022-12-06 DIAGNOSIS — M19.90 INFLAMMATORY ARTHRITIS: ICD-10-CM

## 2022-12-06 DIAGNOSIS — D72.829 LEUKOCYTOSIS, UNSPECIFIED TYPE: ICD-10-CM

## 2022-12-06 DIAGNOSIS — R05.3 CHRONIC COUGH: ICD-10-CM

## 2022-12-06 DIAGNOSIS — R53.83 FATIGUE, UNSPECIFIED TYPE: ICD-10-CM

## 2022-12-06 DIAGNOSIS — R00.0 TACHYCARDIA: ICD-10-CM

## 2022-12-06 DIAGNOSIS — M06.9 RHEUMATOID ARTHRITIS, INVOLVING UNSPECIFIED SITE, UNSPECIFIED WHETHER RHEUMATOID FACTOR PRESENT (H): Primary | ICD-10-CM

## 2022-12-06 DIAGNOSIS — E86.0 DEHYDRATION: Primary | ICD-10-CM

## 2022-12-06 DIAGNOSIS — M25.511 BILATERAL SHOULDER PAIN, UNSPECIFIED CHRONICITY: ICD-10-CM

## 2022-12-06 DIAGNOSIS — M54.50 LOW BACK PAIN OF OVER 3 MONTHS DURATION: ICD-10-CM

## 2022-12-06 DIAGNOSIS — Z79.01 LONG TERM CURRENT USE OF ANTICOAGULANT THERAPY: Primary | ICD-10-CM

## 2022-12-06 DIAGNOSIS — F43.21 ADJUSTMENT DISORDER WITH DEPRESSED MOOD: Chronic | ICD-10-CM

## 2022-12-06 DIAGNOSIS — R05.3 CHRONIC COUGH: Primary | ICD-10-CM

## 2022-12-06 DIAGNOSIS — M25.512 BILATERAL SHOULDER PAIN, UNSPECIFIED CHRONICITY: ICD-10-CM

## 2022-12-06 DIAGNOSIS — G89.29 CHRONIC PAIN OF RIGHT KNEE: ICD-10-CM

## 2022-12-06 DIAGNOSIS — M25.561 CHRONIC PAIN OF RIGHT KNEE: ICD-10-CM

## 2022-12-06 LAB
ALBUMIN SERPL BCG-MCNC: 3.6 G/DL (ref 3.5–5.2)
ALBUMIN UR-MCNC: NEGATIVE MG/DL
ALP SERPL-CCNC: 65 U/L (ref 35–104)
ALT SERPL W P-5'-P-CCNC: 12 U/L (ref 10–35)
ANION GAP SERPL CALCULATED.3IONS-SCNC: 15 MMOL/L (ref 7–15)
APPEARANCE UR: ABNORMAL
AST SERPL W P-5'-P-CCNC: 12 U/L (ref 10–35)
ATRIAL RATE - MUSE: 107 BPM
BACTERIA #/AREA URNS HPF: ABNORMAL /HPF
BASOPHILS # BLD AUTO: 0 10E3/UL (ref 0–0.2)
BASOPHILS NFR BLD AUTO: 0 %
BILIRUB SERPL-MCNC: 0.3 MG/DL
BILIRUB UR QL STRIP: NEGATIVE
BUN SERPL-MCNC: 46.5 MG/DL (ref 8–23)
CALCIUM SERPL-MCNC: 9.7 MG/DL (ref 8.8–10.2)
CAOX CRY #/AREA URNS HPF: ABNORMAL /HPF
CHLORIDE SERPL-SCNC: 106 MMOL/L (ref 98–107)
COLOR UR AUTO: YELLOW
CORTIS SERPL-MCNC: 16.1 UG/DL
CREAT SERPL-MCNC: 2.11 MG/DL (ref 0.51–0.95)
DEPRECATED HCO3 PLAS-SCNC: 18 MMOL/L (ref 22–29)
DIASTOLIC BLOOD PRESSURE - MUSE: NORMAL MMHG
EOSINOPHIL # BLD AUTO: 0 10E3/UL (ref 0–0.7)
EOSINOPHIL NFR BLD AUTO: 0 %
ERYTHROCYTE [DISTWIDTH] IN BLOOD BY AUTOMATED COUNT: 13.7 % (ref 10–15)
GFR SERPL CREATININE-BSD FRML MDRD: 26 ML/MIN/1.73M2
GLUCOSE SERPL-MCNC: 146 MG/DL (ref 70–99)
GLUCOSE UR STRIP-MCNC: NEGATIVE MG/DL
HCT VFR BLD AUTO: 43.2 % (ref 35–47)
HGB BLD-MCNC: 13.9 G/DL (ref 11.7–15.7)
HGB UR QL STRIP: ABNORMAL
IMM GRANULOCYTES # BLD: 0.2 10E3/UL
IMM GRANULOCYTES NFR BLD: 1 %
INTERPRETATION ECG - MUSE: NORMAL
KETONES UR STRIP-MCNC: NEGATIVE MG/DL
LEUKOCYTE ESTERASE UR QL STRIP: ABNORMAL
LYMPHOCYTES # BLD AUTO: 1.2 10E3/UL (ref 0.8–5.3)
LYMPHOCYTES NFR BLD AUTO: 8 %
MCH RBC QN AUTO: 28.5 PG (ref 26.5–33)
MCHC RBC AUTO-ENTMCNC: 32.2 G/DL (ref 31.5–36.5)
MCV RBC AUTO: 89 FL (ref 78–100)
MONOCYTES # BLD AUTO: 0.7 10E3/UL (ref 0–1.3)
MONOCYTES NFR BLD AUTO: 5 %
MUCOUS THREADS #/AREA URNS LPF: PRESENT /LPF
NEUTROPHILS # BLD AUTO: 13.2 10E3/UL (ref 1.6–8.3)
NEUTROPHILS NFR BLD AUTO: 86 %
NITRATE UR QL: NEGATIVE
NRBC # BLD AUTO: 0 10E3/UL
NRBC BLD AUTO-RTO: 0 /100
P AXIS - MUSE: 43 DEGREES
PH UR STRIP: 5 [PH] (ref 5–7)
PLATELET # BLD AUTO: 369 10E3/UL (ref 150–450)
POTASSIUM SERPL-SCNC: 5 MMOL/L (ref 3.4–5.3)
PR INTERVAL - MUSE: 158 MS
PROT SERPL-MCNC: 7.5 G/DL (ref 6.4–8.3)
QRS DURATION - MUSE: 84 MS
QT - MUSE: 334 MS
QTC - MUSE: 445 MS
R AXIS - MUSE: 50 DEGREES
RBC # BLD AUTO: 4.87 10E6/UL (ref 3.8–5.2)
RBC #/AREA URNS AUTO: ABNORMAL /HPF
SODIUM SERPL-SCNC: 139 MMOL/L (ref 136–145)
SP GR UR STRIP: 1.02 (ref 1–1.03)
SQUAMOUS #/AREA URNS AUTO: ABNORMAL /LPF
SYSTOLIC BLOOD PRESSURE - MUSE: NORMAL MMHG
T AXIS - MUSE: 50 DEGREES
TSH SERPL DL<=0.005 MIU/L-ACNC: 1.6 UIU/ML (ref 0.3–4.2)
UROBILINOGEN UR STRIP-ACNC: 0.2 E.U./DL
VENTRICULAR RATE- MUSE: 107 BPM
WBC # BLD AUTO: 15.4 10E3/UL (ref 4–11)
WBC #/AREA URNS AUTO: ABNORMAL /HPF

## 2022-12-06 PROCEDURE — 71046 X-RAY EXAM CHEST 2 VIEWS: CPT | Mod: TC | Performed by: RADIOLOGY

## 2022-12-06 PROCEDURE — 87086 URINE CULTURE/COLONY COUNT: CPT

## 2022-12-06 PROCEDURE — 82533 TOTAL CORTISOL: CPT | Performed by: PATHOLOGY

## 2022-12-06 PROCEDURE — 91312 COVID-19 VACCINE BIVALENT BOOSTER 12+ (PFIZER): CPT | Performed by: FAMILY MEDICINE

## 2022-12-06 PROCEDURE — 36415 COLL VENOUS BLD VENIPUNCTURE: CPT | Performed by: PATHOLOGY

## 2022-12-06 PROCEDURE — 80053 COMPREHEN METABOLIC PANEL: CPT | Performed by: PATHOLOGY

## 2022-12-06 PROCEDURE — 84443 ASSAY THYROID STIM HORMONE: CPT | Performed by: PATHOLOGY

## 2022-12-06 PROCEDURE — 0124A COVID-19 VACCINE BIVALENT BOOSTER 12+ (PFIZER): CPT | Performed by: FAMILY MEDICINE

## 2022-12-06 PROCEDURE — 99215 OFFICE O/P EST HI 40 MIN: CPT | Mod: 25 | Performed by: FAMILY MEDICINE

## 2022-12-06 PROCEDURE — 93000 ELECTROCARDIOGRAM COMPLETE: CPT | Performed by: FAMILY MEDICINE

## 2022-12-06 PROCEDURE — 81001 URINALYSIS AUTO W/SCOPE: CPT

## 2022-12-06 PROCEDURE — 99417 PROLNG OP E/M EACH 15 MIN: CPT | Performed by: FAMILY MEDICINE

## 2022-12-06 PROCEDURE — 85027 COMPLETE CBC AUTOMATED: CPT | Performed by: PATHOLOGY

## 2022-12-06 RX ORDER — TRAMADOL HYDROCHLORIDE 50 MG/1
50 TABLET ORAL EVERY 6 HOURS PRN
Qty: 10 TABLET | Refills: 0 | Status: SHIPPED | OUTPATIENT
Start: 2022-12-06 | End: 2022-12-26

## 2022-12-06 RX ORDER — METHYLPREDNISOLONE 4 MG
TABLET, DOSE PACK ORAL
Qty: 21 TABLET | Refills: 0 | Status: SHIPPED | OUTPATIENT
Start: 2022-12-06 | End: 2023-01-28

## 2022-12-06 ASSESSMENT — PATIENT HEALTH QUESTIONNAIRE - PHQ9
SUM OF ALL RESPONSES TO PHQ QUESTIONS 1-9: 15
5. POOR APPETITE OR OVEREATING: SEVERAL DAYS

## 2022-12-06 ASSESSMENT — ANXIETY QUESTIONNAIRES
GAD7 TOTAL SCORE: 8
IF YOU CHECKED OFF ANY PROBLEMS ON THIS QUESTIONNAIRE, HOW DIFFICULT HAVE THESE PROBLEMS MADE IT FOR YOU TO DO YOUR WORK, TAKE CARE OF THINGS AT HOME, OR GET ALONG WITH OTHER PEOPLE: EXTREMELY DIFFICULT
2. NOT BEING ABLE TO STOP OR CONTROL WORRYING: NEARLY EVERY DAY
3. WORRYING TOO MUCH ABOUT DIFFERENT THINGS: MORE THAN HALF THE DAYS
5. BEING SO RESTLESS THAT IT IS HARD TO SIT STILL: NOT AT ALL
6. BECOMING EASILY ANNOYED OR IRRITABLE: NOT AT ALL
1. FEELING NERVOUS, ANXIOUS, OR ON EDGE: MORE THAN HALF THE DAYS
GAD7 TOTAL SCORE: 8
7. FEELING AFRAID AS IF SOMETHING AWFUL MIGHT HAPPEN: NOT AT ALL

## 2022-12-06 ASSESSMENT — PAIN SCALES - GENERAL: PAINLEVEL: SEVERE PAIN (7)

## 2022-12-06 NOTE — NURSING NOTE
Shira PETER Rodriguez received the bivalent Covid booster (Pfizer) in clinic today at the request of Dr. Busby. The immunization site was cleaned with an alcohol prep wipe. The immunization was given without incident--see immunization list for administration details. No swelling or redness was observed at the site of injection after the immunization was given. Pt has no history of reaction to vaccines. Pt remained in [facility] for at least 15 minutes in case of an adverse reaction.     FELIX Farias at 10:04 AM on 12/6/2022

## 2022-12-06 NOTE — TELEPHONE ENCOUNTER
Pt was informed the results and recommendations. I coordinated scheduling the lab and x ray at  [facility].    [facility]  ---------------------------------------------------------------------------      ----- Message from Anjel Busby MD sent at 12/6/2022 12:28 PM CST -----  I just saw her  White count up  I have to wonder if infection  No clear symptoms of infection  See if anyway for her to do cxr two view re; chronic cough, and UA/UC re: fatigue  Both today lab/xray only  Here or any [facility] facility that can

## 2022-12-06 NOTE — PROGRESS NOTES
Assessment & Plan     Rheumatoid arthritis, involving unspecified site, unspecified whether rheumatoid factor present (H)  Covid booster. Some of her fatigue and pain could be untreated RA. Pred burst. Small amount prn Ultram due to limiting pain. She will get and start the methotrexate which seemed to help in the past per her recall; then is was injectable, now pill form  - COVID-19 VACCINE BIVALENT BOOSTER 12+ (PFIZER)  - traMADol (ULTRAM) 50 MG tablet; Take 1 tablet (50 mg) by mouth every 6 hours as needed for severe pain (7-10)  - methylPREDNISolone (MEDROL DOSEPAK) 4 MG tablet therapy pack; Follow Package Directions    Fatigue, unspecified type  Check.   - CBC with platelets differential; Future  - TSH with free T4 reflex; Future  - Comprehensive metabolic panel; Future  - Cortisol; Future    Tachycardia  Sinus.   - EKG Performed in Clinic w/ Provider Reading Fee  Sinus today  Chronic pain of right knee  Cont w/ Sports Med, injection helped    Bilateral shoulder pain, unspecified chronicity  Cont w/ Sports Med, see pain clinic as planned    Low back pain of over 3 months duration  Discuss w/ pain clinic    Inflammatory arthritis  Start methotrexate    Adjustment disorder with depressed mood  Cont w/ current team; some fatigue could be mood related but likely from physical causes    Chronic cough  No significant change (pulm notes rvwd) but after saw elevated wbc today (not on steroids right now), I've asked RN to see if she can do CXR/UA today    Leukocytosis, unspecified type  Same    Discussed role pain clinic  78  minutes spent on the date of the encounter doing chart review, history and exam, documentation and further activities per the note  This does not include EKG read time    After she left we set up Urine and CXR for hi WBC, then I called her and suggested to ER today re: likely dehydrated possibly unidentified infection w/ hi wbc, she deferred going to ER will push fluids and redo cbc/bmp lab only  "tmrw  BMI:   Estimated body mass index is 46 kg/m  as calculated from the following:    Height as of this encounter: 1.676 m (5' 6\").    Weight as of this encounter: 129.3 kg (285 lb).     Depression Screening Follow Up    PHQ 12/6/2022   PHQ-9 Total Score 15   Q9: Thoughts of better off dead/self-harm past 2 weeks Not at all       Follow Up Actions Taken  Crisis resource information provided in After Visit Summary  She has outside psychologist ad psychiatrist she is activiely working with       Anjel Busby MD  [facility] [place]    Jacky Silva is a 62 year old, presenting for the following health issues:  Follow Up and Pain (Both shoulders, right knee, and lower back)      HPI Here for a few things  Since last saw me worked w/ Sports Med, Pulm, Gyn, Rheum, all reviewed in visit  UTD mammogram, dexa, colon reviewed in visit  Pain: worse and worse-shoulders (both), low back, one knee. Knee better not gone post recent cortisone shot. Shoulder cortisone injection (Right) has not helped, more recently developed L shoulder pain last few weeks. Low back pain chronic, 2019 MR showed DDD and nerve impingement, does not get clear sciatica. Pain greatly limits daily comfort and function. Has to push to get out of chair and very difficult due to pain  Fatigue: last CYRUS MD clinic noted not significant CYRUS if sleeps at incline, generally does sleep in a recliner  RA: start methotrexate today, in past used, stopped on her own earlier this year, fatigue/pain might be worse since then, although has signficiant degenerative processes on imaging too  Mood low, sees psychiatry and psychology (elsewhere), medical situation very stressful  Disposition: lives alone, pain limits her enough she has a hard time accomplishing all basic tasks so apt is messy for example. Discussed could she use HHN, she is basically home bound by medical symptoms, also discussed could she use PCA, and what these " different services are, she is open to talking to our SW about possible PCA, I will put them in touch she knows  Has been working w/ pulm on chronic cough, per pt still coughing, also pleural effusion, tap results suggest inflammatory cause per pulm result notes, discussed  Elevated creatinine, recheck due  Tachycardia noted, no known thyroid disease but she has two first degree relatives w/ thyroid disease and she has autoimmune diagnosis herself already  Cannot use nsaids (on coumadin for PE, has elevated creatinine) for pain, tylenol no help  No h/o substance use disorder,  rvwd, 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  Due for covid booster    Past Medical History:   Diagnosis Date     Chronic bronchitis (H) 07/30/2015     Contact dermatitis      Depressive disorder 1985     Eczema     HANDS     Elevated liver enzymes      H/O total knee replacement, left      History of total hip replacement 10/27/2011     Hyperlipidemia      Hypertension      Morbid obesity (H)      Osteoarthrosis     right knee     RA (rheumatoid arthritis) (H) 8/2018     Sleep apnea      Tobacco use disorder      Varicella 1965     Past Surgical History:   Procedure Laterality Date     ARTHROPLASTY KNEE  6/14/2012    Procedure: ARTHROPLASTY KNEE;  Left Total Knee Arthroplasty;  Surgeon: Annette Quesada MD;  Location: [facility] OR     ARTHROSCOPY HIP Right      BRONCHOSCOPY FLEXIBLE AND RIGID N/A 9/17/2019    Procedure: Bronchoscopy flexible;  Surgeon: Patrick Rayo MD;  Location:  OR     COLONOSCOPY N/A 7/16/2021    Procedure: COLONOSCOPY, WITH POLYPECTOMY AND BIOPSY;  Surgeon: Diogo Lynch MD;  Location:  GI     CV EXTRACORPERAL MEMBRANE OXYGENATION N/A 9/9/2019    Procedure: Extracorporeal Membrance Oxygenation;  Surgeon: Kaleb Mcfarlane MD;  Location:  [facility]ATH LAB     INSERT EXTRACORPORAL MEMBRANE OXYGENATOR N/A 9/17/2019    Procedure: Venous-Venous cannulation using  "ultrasound guidance and transesophageal echocardiogram, venous-arterial ecmo decannulation and repair of left femoral artery;  Surgeon: Patrick Rayo MD;  Location: [facility] OR     IR MECH THROMB PRIM ART, NON-INTRACRNL  9/10/2019     IR PULMONARY ANGIOGRAM BILATERAL  9/10/2019     IR THROMBOLYSIS ARTERIAL INFUSION INITIAL DAY  9/10/2019     THORACENTESIS N/A 1/16/2020    Procedure: THORACENTESIS;  Surgeon: Georgina Richardson MD;  Location: [facility] GI     [facility] TOTAL HIP ARTHROPLASTY  07/09/04    RT     Current Outpatient Medications   Medication     acetaminophen (TYLENOL) 325 MG tablet     albuterol (PROAIR HFA/PROVENTIL HFA/VENTOLIN HFA) 108 (90 Base) MCG/ACT inhaler     ARIPiprazole (ABILIFY) 10 MG tablet     DULoxetine (CYMBALTA) 60 MG capsule     insulin syringe-needle U-100 (BD INSULIN SYRINGE ULTRAFINE) 30G X 1/2\" 1 ML miscellaneous     lisinopril (ZESTRIL) 5 MG tablet     methotrexate 2.5 MG tablet     methylPREDNISolone (MEDROL DOSEPAK) 4 MG tablet therapy pack     omeprazole (PRILOSEC) 20 MG DR capsule     traMADol (ULTRAM) 50 MG tablet     VITAMIN D3 25 MCG (1000 UT) tablet     warfarin ANTICOAGULANT (COUMADIN) 5 MG tablet     atorvastatin (LIPITOR) 40 MG tablet     enoxaparin ANTICOAGULANT (LOVENOX) 100 MG/ML syringe     methotrexate 50 MG/2ML injection     Current Facility-Administered Medications   Medication     lidocaine (PF) (XYLOCAINE) 1 % injection 2 mL     lidocaine (PF) (XYLOCAINE) 1 % injection 3 mL     lidocaine (PF) (XYLOCAINE) 1 % injection 4 mL     triamcinolone (KENALOG-40) injection 40 mg     triamcinolone (KENALOG-40) injection 40 mg     Facility-Administered Medications Ordered in Other Visits   Medication     sodium chloride (PF) 0.9% PF flush 10 mL     Allergies   Allergen Reactions     Adhesive Tape Blisters     Erythromycin Rash           Review of Systems         Objective    /77 (BP Location: Right arm, Patient Position: Sitting, Cuff Size: Adult Regular)   Pulse (!) 121   " "Ht 1.676 m (5' 6\")   Wt 129.3 kg (285 lb)   SpO2 95%   BMI 46.00 kg/m    Body mass index is 46 kg/m .  Physical Exam   GENERAL: Fatigued, alert and no distress  MS: no gross musculoskeletal defects noted, no edema  PSYCH: mentation appears normal, affect fatigued          \    "

## 2022-12-06 NOTE — TELEPHONE ENCOUNTER
I set up the lab appt at 1:15 pm tomorrow.    Soon-[facility]  ------------------------------------------------------------------------------        ----- Message from Anjel Busby MD sent at 12/6/2022  3:48 PM CST -----  I just talked to her  I suggested ER for dehydration and possible infection  She wants to stay home and push fluids  She will do labs again tmrw  Have her go back to [employer] tmrw  Do cbc/diff, bmp re: dehydration

## 2022-12-06 NOTE — NURSING NOTE
Shira Rodriguez is a 62 year old female patient that presents today in clinic for the following:    Chief Complaint   Patient presents with     Follow Up     Pain     Both shoulders, right knee, and lower back     The patient's allergies and medications were reviewed as noted. A set of vitals were recorded as noted without incident. The patient does not have any other questions for the provider.    Tiny Moody, FELIX at 8:56 AM on 12/6/2022

## 2022-12-06 NOTE — TELEPHONE ENCOUNTER
ANTICOAGULATION CLINIC REFERRAL RENEWAL REQUEST       An annual renewal order is required for all patients referred to [facility].?  Please review and sign the pended referral order for Shira Rodriguez.       ANTICOAGULATION SUMMARY      Warfarin indication(s)   PE    Mechanical heart valve present?  NO       Current goal range   INR: 2.0-3.0     Goal appropriate for indication? Goal INR 2-3, standard for indication(s) above     Time in Therapeutic Range (TTR)  (Goal > 60%) 54.5%       Office visit with referring provider's group within last year yes on 12/6/22       Mariah Sellers RN  [facility]

## 2022-12-07 ENCOUNTER — LAB (OUTPATIENT)
Dept: LAB | Facility: CLINIC | Age: 62
End: 2022-12-07
Payer: COMMERCIAL

## 2022-12-07 DIAGNOSIS — E86.0 DEHYDRATION: ICD-10-CM

## 2022-12-07 LAB
ANION GAP SERPL CALCULATED.3IONS-SCNC: 18 MMOL/L (ref 7–15)
BASOPHILS # BLD AUTO: 0 10E3/UL (ref 0–0.2)
BASOPHILS NFR BLD AUTO: 0 %
BUN SERPL-MCNC: 55 MG/DL (ref 8–23)
CALCIUM SERPL-MCNC: 9.3 MG/DL (ref 8.8–10.2)
CHLORIDE SERPL-SCNC: 105 MMOL/L (ref 98–107)
CREAT SERPL-MCNC: 2.18 MG/DL (ref 0.51–0.95)
DEPRECATED HCO3 PLAS-SCNC: 14 MMOL/L (ref 22–29)
EOSINOPHIL # BLD AUTO: 0.1 10E3/UL (ref 0–0.7)
EOSINOPHIL NFR BLD AUTO: 1 %
ERYTHROCYTE [DISTWIDTH] IN BLOOD BY AUTOMATED COUNT: 14 % (ref 10–15)
GFR SERPL CREATININE-BSD FRML MDRD: 25 ML/MIN/1.73M2
GLUCOSE SERPL-MCNC: 90 MG/DL (ref 70–99)
HCT VFR BLD AUTO: 41.1 % (ref 35–47)
HGB BLD-MCNC: 13.4 G/DL (ref 11.7–15.7)
LYMPHOCYTES # BLD AUTO: 1.4 10E3/UL (ref 0.8–5.3)
LYMPHOCYTES NFR BLD AUTO: 13 %
MCH RBC QN AUTO: 28.8 PG (ref 26.5–33)
MCHC RBC AUTO-ENTMCNC: 32.6 G/DL (ref 31.5–36.5)
MCV RBC AUTO: 88 FL (ref 78–100)
MONOCYTES # BLD AUTO: 0.9 10E3/UL (ref 0–1.3)
MONOCYTES NFR BLD AUTO: 8 %
NEUTROPHILS # BLD AUTO: 8.3 10E3/UL (ref 1.6–8.3)
NEUTROPHILS NFR BLD AUTO: 78 %
PLATELET # BLD AUTO: 271 10E3/UL (ref 150–450)
POTASSIUM SERPL-SCNC: 4.5 MMOL/L (ref 3.4–5.3)
RBC # BLD AUTO: 4.66 10E6/UL (ref 3.8–5.2)
SODIUM SERPL-SCNC: 137 MMOL/L (ref 136–145)
WBC # BLD AUTO: 10.6 10E3/UL (ref 4–11)

## 2022-12-07 PROCEDURE — 85025 COMPLETE CBC W/AUTO DIFF WBC: CPT

## 2022-12-07 PROCEDURE — 36415 COLL VENOUS BLD VENIPUNCTURE: CPT

## 2022-12-07 PROCEDURE — 80048 BASIC METABOLIC PNL TOTAL CA: CPT

## 2022-12-08 ENCOUNTER — TELEPHONE (OUTPATIENT)
Dept: INTERNAL MEDICINE | Facility: CLINIC | Age: 62
End: 2022-12-08

## 2022-12-08 LAB — BACTERIA UR CULT: NORMAL

## 2022-12-08 NOTE — TELEPHONE ENCOUNTER
Pt was informed the result and recommendation.  She will be called for the lab appt.    Soon-[facility]  ------------------------------------------------------------------------------          ----- Message from Anjel Busby MD sent at 12/8/2022  9:00 AM CST -----  Yesterday creatinine about the same  Have her keep working on pushing fluids  Redo BMP lab only Friday (I'm in Friday and Saturday so I'll see it)

## 2022-12-12 ENCOUNTER — LAB (OUTPATIENT)
Dept: LAB | Facility: CLINIC | Age: 62
End: 2022-12-12
Payer: COMMERCIAL

## 2022-12-12 ENCOUNTER — ANTICOAGULATION THERAPY VISIT (OUTPATIENT)
Dept: ANTICOAGULATION | Facility: CLINIC | Age: 62
End: 2022-12-12

## 2022-12-12 DIAGNOSIS — Z79.01 LONG TERM CURRENT USE OF ANTICOAGULANT THERAPY: ICD-10-CM

## 2022-12-12 DIAGNOSIS — I26.99 PULMONARY EMBOLI (H): Primary | ICD-10-CM

## 2022-12-12 LAB — INR BLD: 2.4 (ref 0.9–1.1)

## 2022-12-12 PROCEDURE — 85610 PROTHROMBIN TIME: CPT

## 2022-12-12 PROCEDURE — 36416 COLLJ CAPILLARY BLOOD SPEC: CPT

## 2022-12-12 NOTE — PROGRESS NOTES
ANTICOAGULATION MANAGEMENT     Shira Rodriguez 62 year old female is on warfarin with therapeutic INR result. (Goal INR 2.0-3.0)    Recent labs: (last 7 days)     12/12/22  1454   INR 2.4*       ASSESSMENT       Source(s): Chart Review and Patient/Caregiver Call       Warfarin doses taken: Warfarin taken as instructed    Diet: No new diet changes identified    New illness, injury, or hospitalization: No    Medication/supplement changes: Steroid pack completed, using Tylenol less often.    Signs or symptoms of bleeding or clotting: No    Previous INR: Supratherapeutic    Additional findings: None       PLAN     Recommended plan for temporary change(s) affecting INR     Dosing Instructions: Continue your current warfarin dose with next INR in 3 weeks       Summary  As of 12/12/2022    Full warfarin instructions:  5 mg every Mon; 2.5 mg all other days; Starting 12/12/2022   Next INR check:  1/2/2023             Telephone call with Shira who verbalizes understanding and agrees to plan and who agrees to plan and repeated back plan correctly    Lab visit scheduled    Education provided:     Contact [phone] with any changes, questions or concerns.     Plan made per [facility] anticoagulation protocol    YUNIER DAVIDSON RN  Anticoagulation Clinic  12/12/2022    _______________________________________________________________________     Anticoagulation Episode Summary     Current INR goal:  2.0-3.0   TTR:  53.2 % (1 y)   Target end date:  Indefinite   Send INR reminders to:  [facility]    Indications    Pulmonary emboli (H) [I26.99]  Long term current use of anticoagulant therapy [Z79.01]           Comments:           Anticoagulation Care Providers     Provider Role Specialty Phone number    Anjel Busby MD Referring Family Medicine [phone]

## 2022-12-24 DIAGNOSIS — M06.9 RHEUMATOID ARTHRITIS, INVOLVING UNSPECIFIED SITE, UNSPECIFIED WHETHER RHEUMATOID FACTOR PRESENT (H): ICD-10-CM

## 2022-12-24 NOTE — TELEPHONE ENCOUNTER
traMADol (ULTRAM) 50 MG tablet  Last Written Prescription Date:  12/6/22  Last Fill Quantity: 10,   # refills: 0  Last Office Visit : 12/6/22  Future Office visit:  none    Routing refill request to provider for review/approval because: end date 12/9/22

## 2022-12-26 RX ORDER — TRAMADOL HYDROCHLORIDE 50 MG/1
TABLET ORAL
Qty: 10 TABLET | Refills: 0 | Status: SHIPPED | OUTPATIENT
Start: 2022-12-26 | End: 2023-01-06

## 2022-12-29 ENCOUNTER — TELEPHONE (OUTPATIENT)
Dept: FAMILY MEDICINE | Facility: CLINIC | Age: 62
End: 2022-12-29

## 2022-12-29 DIAGNOSIS — M06.9 RHEUMATOID ARTHRITIS, INVOLVING UNSPECIFIED SITE, UNSPECIFIED WHETHER RHEUMATOID FACTOR PRESENT (H): ICD-10-CM

## 2022-12-29 DIAGNOSIS — M17.11 OSTEOARTHRITIS OF RIGHT KNEE, UNSPECIFIED OSTEOARTHRITIS TYPE: Primary | ICD-10-CM

## 2022-12-29 DIAGNOSIS — M50.30 DEGENERATION OF CERVICAL INTERVERTEBRAL DISC: ICD-10-CM

## 2022-12-29 NOTE — TELEPHONE ENCOUNTER
I spoke with Shira and updated that referral was not initially placed. Discussed with social work (Mindi). Social work will call patient next week. Shira voiced understanding.    Shira requested that a consent to communicate form be mailed to her. She would like to allow her sister permission to discuss her care with Dr. Busby. Form mailed as requested.    Georgette (Pete) ELLIS Salgado

## 2022-12-29 NOTE — TELEPHONE ENCOUNTER
[facility] Call Center    Phone Message    May a detailed message be left on voicemail: yes     Reason for Call: Other: The patient was asking for the nurse to call her back with updated on the  calling her, please review and follow up thank you.     Action Taken: Message routed to:  [facility] pcc    Travel Screening: Not Applicable

## 2023-01-02 ENCOUNTER — ANTICOAGULATION THERAPY VISIT (OUTPATIENT)
Dept: ANTICOAGULATION | Facility: CLINIC | Age: 63
End: 2023-01-02

## 2023-01-02 ENCOUNTER — LAB (OUTPATIENT)
Dept: LAB | Facility: CLINIC | Age: 63
End: 2023-01-02
Payer: COMMERCIAL

## 2023-01-02 DIAGNOSIS — Z79.01 LONG TERM CURRENT USE OF ANTICOAGULANT THERAPY: ICD-10-CM

## 2023-01-02 DIAGNOSIS — I26.99 PULMONARY EMBOLI (H): Primary | ICD-10-CM

## 2023-01-02 LAB — INR BLD: 2.2 (ref 0.9–1.1)

## 2023-01-02 PROCEDURE — 36416 COLLJ CAPILLARY BLOOD SPEC: CPT

## 2023-01-02 PROCEDURE — 85610 PROTHROMBIN TIME: CPT

## 2023-01-02 NOTE — PROGRESS NOTES
ANTICOAGULATION MANAGEMENT     Shira Rodriguez 62 year old female is on warfarin with therapeutic INR result. (Goal INR 2.0-3.0)    Recent labs: (last 7 days)     01/02/23  1317   INR 2.2*       ASSESSMENT       Source(s): Chart Review    Previous INR was Therapeutic last visit; previously outside of goal range    Medication, diet, health changes since last INR chart reviewed; none identified       PLAN     Recommended plan for no diet, medication or health factor changes affecting INR     Dosing Instructions: Continue your current warfarin dose with next INR in 4 weeks       Summary  As of 1/2/2023    Full warfarin instructions:  5 mg every Mon; 2.5 mg all other days   Next INR check:  1/30/2023             Detailed voice message left for Shira with dosing instructions and follow up date.     Contact [phone] to schedule and with any changes, questions or concerns.     Education provided:     Please call back if any changes to your diet, medications or how you've been taking warfarin    Plan made per [facility] anticoagulation protocol    Fracnis Ellis RN  Anticoagulation Clinic  1/2/2023    _______________________________________________________________________     Anticoagulation Episode Summary     Current INR goal:  2.0-3.0   TTR:  53.2 % (1 y)   Target end date:  Indefinite   Send INR reminders to:  [facility]    Indications    Pulmonary emboli (H) [I26.99]  Long term current use of anticoagulant therapy [Z79.01]           Comments:           Anticoagulation Care Providers     Provider Role Specialty Phone number    Anjel Busby MD Referring Family Medicine [phone]

## 2023-01-03 ENCOUNTER — PATIENT OUTREACH (OUTPATIENT)
Dept: CARE COORDINATION | Facility: CLINIC | Age: 63
End: 2023-01-03

## 2023-01-06 DIAGNOSIS — M06.9 RHEUMATOID ARTHRITIS, INVOLVING UNSPECIFIED SITE, UNSPECIFIED WHETHER RHEUMATOID FACTOR PRESENT (H): ICD-10-CM

## 2023-01-06 RX ORDER — TRAMADOL HYDROCHLORIDE 50 MG/1
50 TABLET ORAL EVERY 6 HOURS PRN
Qty: 10 TABLET | Refills: 0 | Status: ON HOLD | OUTPATIENT
Start: 2023-01-06 | End: 2023-02-21

## 2023-01-06 NOTE — TELEPHONE ENCOUNTER
traMADol (ULTRAM) 50 MG tablet 10 tablet 0 12/26/2022         Last Written Prescription Date:  12-  Last Fill Quantity: 10,   # refills: 0  Last Office Visit : 12-6-2022  Future Office visit:  6-1-2023    Routing refill request to provider for review/approval because:  CONTROLLED MEDICATION      Kathleen M Doege RN

## 2023-01-10 ENCOUNTER — MYC MEDICAL ADVICE (OUTPATIENT)
Dept: FAMILY MEDICINE | Facility: CLINIC | Age: 63
End: 2023-01-10

## 2023-01-10 DIAGNOSIS — M25.511 BILATERAL SHOULDER PAIN, UNSPECIFIED CHRONICITY: ICD-10-CM

## 2023-01-10 DIAGNOSIS — M54.9 CHRONIC BACK PAIN GREATER THAN 3 MONTHS DURATION: ICD-10-CM

## 2023-01-10 DIAGNOSIS — G89.29 CHRONIC BACK PAIN GREATER THAN 3 MONTHS DURATION: ICD-10-CM

## 2023-01-10 DIAGNOSIS — M50.30 DEGENERATION OF CERVICAL INTERVERTEBRAL DISC: ICD-10-CM

## 2023-01-10 DIAGNOSIS — G89.29 CHRONIC PAIN OF RIGHT KNEE: ICD-10-CM

## 2023-01-10 DIAGNOSIS — M62.81 GENERALIZED MUSCLE WEAKNESS: ICD-10-CM

## 2023-01-10 DIAGNOSIS — M25.512 BILATERAL SHOULDER PAIN, UNSPECIFIED CHRONICITY: ICD-10-CM

## 2023-01-10 DIAGNOSIS — M25.561 CHRONIC PAIN OF RIGHT KNEE: ICD-10-CM

## 2023-01-10 DIAGNOSIS — M06.9 RHEUMATOID ARTHRITIS, INVOLVING UNSPECIFIED SITE, UNSPECIFIED WHETHER RHEUMATOID FACTOR PRESENT (H): Primary | ICD-10-CM

## 2023-01-10 ASSESSMENT — PAIN SCALES - PAIN ENJOYMENT GENERAL ACTIVITY SCALE (PEG)
PEG_TOTALSCORE: 8
INTERFERED_ENJOYMENT_LIFE: 9
INTERFERED_GENERAL_ACTIVITY: 9
INTERFERED_ENJOYMENT_LIFE: 9
AVG_PAIN_PASTWEEK: 6
AVG_PAIN_PASTWEEK: 6
INTERFERED_GENERAL_ACTIVITY: 9
PEG_TOTALSCORE: 8

## 2023-01-10 ASSESSMENT — ANXIETY QUESTIONNAIRES
1. FEELING NERVOUS, ANXIOUS, OR ON EDGE: SEVERAL DAYS
3. WORRYING TOO MUCH ABOUT DIFFERENT THINGS: SEVERAL DAYS
4. TROUBLE RELAXING: NOT AT ALL
8. IF YOU CHECKED OFF ANY PROBLEMS, HOW DIFFICULT HAVE THESE MADE IT FOR YOU TO DO YOUR WORK, TAKE CARE OF THINGS AT HOME, OR GET ALONG WITH OTHER PEOPLE?: SOMEWHAT DIFFICULT
GAD7 TOTAL SCORE: 3
GAD7 TOTAL SCORE: 3
2. NOT BEING ABLE TO STOP OR CONTROL WORRYING: SEVERAL DAYS
5. BEING SO RESTLESS THAT IT IS HARD TO SIT STILL: NOT AT ALL
6. BECOMING EASILY ANNOYED OR IRRITABLE: NOT AT ALL
7. FEELING AFRAID AS IF SOMETHING AWFUL MIGHT HAPPEN: NOT AT ALL
7. FEELING AFRAID AS IF SOMETHING AWFUL MIGHT HAPPEN: NOT AT ALL
IF YOU CHECKED OFF ANY PROBLEMS ON THIS QUESTIONNAIRE, HOW DIFFICULT HAVE THESE PROBLEMS MADE IT FOR YOU TO DO YOUR WORK, TAKE CARE OF THINGS AT HOME, OR GET ALONG WITH OTHER PEOPLE: SOMEWHAT DIFFICULT

## 2023-01-10 NOTE — PLAN OF CARE
DISCHARGE                         10/7/2019  ----------------------------------------------------------------------------  Discharged to: [facility]  Via: Medical transport  Accompanied by: Medical transport staff  Discharge Instructions: diet, activity, medications, follow up appointments, when to call the MD, aftercare instructions, and what to watchout for (i.e. s/s of infection, increasing SOB, palpitations, chest pain)  Prescriptions: To be filled by [facility]'s pharmacy per pt's request; medication list reviewed & sent with pt  Follow Up Appointments: arranged; information given  Belongings: All sent with pt  IV: out  Telemetry: off  Pt exhibits understanding of above discharge instructions; all questions answered.    Discharge Paperwork: Signed, copied, and sent with patient.      January 11, 2023     Ion Jo MD  [address]    Patient: Rufina Dalal   YOB: 1998   Date of Visit: 1/10/2023       Dear Dr Bianca Palencia: Thank you for referring Rufina Dalal to me for evaluation  Below are my notes for this consultation  If you have questions, please do not hesitate to call me  I look forward to following your patient along with you  Sincerely,        Clint Burgess MD        CC: No Recipients  Clint Burgess MD  1/11/2023  4:55 PM  Sign when Signing Visit  A fetal ultrasound was completed  See Ob procedures in Epic for an interpretation and recommendations  Do not hesitate to contact us in [facility] if you have questions  Babita Schreiber MD, [facility]  Maternal Fetal Medicine

## 2023-01-10 NOTE — PROGRESS NOTES
Social Work Intervention  [facility]    Data/Intervention:    Patient Name:  Shira Rodriguez  /Age:  1960 (62 year old)    Visit Type: telephone  Referral Source: Dr Busby  Reason for Referral:  Services needed at home    Collaborated With:    -Shira and her sister Kirsten    Psychosocial Information/Concerns:  Pt lives alone in an apt in [place]. Her sister is visiting and is a strong social support for Shira. Their Mother  this past year and that has been difficult. Pt was particularly close to her. 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 She had a major medical event which has had lasting effects. Pt reports that pain is her main issue due to RA and osteo arthitis- knee, back, shoulders. Getting up from a chair is very difficult due to the pain it causes her shoulders. Folding clothes, housekeeping, driving all very difficult. She does most of her grocery shopping online. She has a lot of fatigue and no energy to do anything due to the pain. Also depression and anxiety are factors and she talks with a therapist by phone and sees a psychiatrist. She recently hired 2 women to help around the house and that went well. They are wondering if insurance can fund any of that. Also wondering about other resources.     Intervention/Education/Resources Provided:  Discussed that the kind of services Pt needs are not covered by insurance (housekeeping/detention care) and reviewed criteria for [facility] funded services and she is not eligible. They will continue with this services and pay privately.  Discussed 2 transportation options, [employer] which requires an application and [employer]. Provided info about each and will initiate the [employer] application and mail it to her to finish and mail.  Her sister is concerned about her social isolation and Pt agreed that a support group might be helpful. Discussed some difficult support group themes and will research and send info re  chronic pain and disabled nurses support.   Her sister would like to see her get more PT because it was very motivating for her. They discussed that pool therapy would be great. Suggested she send a Mychart to Dr Busby requesting orders and where they would want them sent. Trich had an idea of a facility that she goes to.  Pt's counseling visits are with Jan and by phone. She didn't know why from phone vs video. Suggested requesting video visits so she can have that interaction. Also discuss possibly engaging in a day program and to ask her therapist about that.    Validated and supported re the difficult experience of accepting help with things she used to be able to do so easily.     Assessment/Plan:  Pt/sister indicated that the phone call was helpful and are aware they can contact me again as needed.  Will send mychart with support group resources.    Provided patient/family with contact information and availability.    ANTONINA Whipple, [facility]    [facility] [place]  [phone]

## 2023-01-17 ENCOUNTER — OFFICE VISIT (OUTPATIENT)
Dept: ANESTHESIOLOGY | Facility: CLINIC | Age: 63
End: 2023-01-17
Attending: FAMILY MEDICINE
Payer: COMMERCIAL

## 2023-01-17 VITALS
HEART RATE: 106 BPM | OXYGEN SATURATION: 95 % | DIASTOLIC BLOOD PRESSURE: 69 MMHG | HEIGHT: 66 IN | BODY MASS INDEX: 45.8 KG/M2 | WEIGHT: 285 LBS | SYSTOLIC BLOOD PRESSURE: 102 MMHG

## 2023-01-17 DIAGNOSIS — G89.4 CHRONIC PAIN SYNDROME: Primary | ICD-10-CM

## 2023-01-17 PROCEDURE — 99204 OFFICE O/P NEW MOD 45 MIN: CPT | Performed by: ANESTHESIOLOGY

## 2023-01-17 ASSESSMENT — PAIN SCALES - GENERAL: PAINLEVEL: SEVERE PAIN (6)

## 2023-01-17 NOTE — NURSING NOTE
Patient presents with:  Consult: Consult Right Knee, both Shoulder Pain      Severe Pain (6)     Pain Medications     Analgesics Other Refills Start End     acetaminophen (TYLENOL) 325 MG tablet     10/11/2019     Sig - Route: Take 2 tablets (650 mg) by mouth every 6 hours as needed for mild pain or fever - Oral    Class: OTC    Opioid Agonists Refills Start End     traMADol (ULTRAM) 50 MG tablet    0 1/6/2023     Sig - Route: Take 1 tablet (50 mg) by mouth every 6 hours as needed for severe pain (7-10) - Oral    Class: E-Prescribe          What medications are you using for pain? Tramadol, Tylenol    (New patients only) Have you been seen by another pain clinic/ provider? no    (Return Patients only) What refills are you needing today? No    Expectation Need some pain management

## 2023-01-17 NOTE — PROGRESS NOTES
Pain Clinic New Patient Consult Note:    Referring Provider: Qi   Primary care provider: Anjel Busby    Shira Rodriguez is a 62 year old y.o. old female who presents to the pain clinic with chronic pain in bilateral shoulders, right knee and lower back. She is coming to the clinic with her sister Kirsten.     HPI:  Patient Supplied Answers To the [facility] Pain Questionnaire  [facility] -  Patient Entered Questionnaire/Answers 1/10/2023   What number best describes your pain right now:  0 = No pain  to  10 = Worst pain imaginable 5   How would you describe the pain? sharp, dull, aching   Which of the following worsen your pain? standing, walking, exercise   Which of the following improve or reduce your pain? sitting, medication   What number best describes your average pain for the past week:  0 = No pain  to  10 = Worst pain imaginable 6   What number best describes your LOWEST pain in past 24 hours:  0 = No pain  to  10 = Worst pain imaginable 0   What number best describes your WORST pain in past 24 hours:  0 = No pain  to  10 = Worst pain imaginable 8   When is your pain worst? Constant   What non-medicine treatments have you already had for your pain? other nerve blocks   Have you tried treating your pain with medication?  -   Are you currently taking medications for your pain? -     Shira presents to the pain clinic with PMH of RA and OA. The patient is coming with her sister Kirsten. They have been best friends since they were toddlers. Her sister has been an [employer] member in the past. The patient complains of bilateral shoulder pain, right knee pain. She underwent right shoulder and right knee were injected by orthopedics in November 2022. She reports that the injection for the knee joint wore off. The shoulder joint injection was not helpful. The patient feels that any movement causes pain. The patieent avoids movement and finds herself sitting in a chair all day. She spends her day at home watching tv  because that is all she is able to tolerate.     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 She was involved in an accident and had to undergo hip surgery. The patient feels that she was discriminated against and laid off the job shortly after her hip surgery. Her sister explains that the patient was in a cycle of depression and stayed alone. It seems that after being fired she never returned to work.     The patient continued to experience weight gain and loneliness and isolation worsened. The patient had an episode of PE and had prolonged hospitalization with ECMO treatment. After discharge she had some rehab therapy but was not motivated to keep up.      Pool therapy hoping at [facility]. She meets with a psychiatrist and psychologist at [facility]. The patient states that any movement causes pain and she does not want to move unless the pain is better.          Pain treatments:    Herbal medicines: none  Physical therapy: for stamina in 2022. She did not have shoulder pain at the time.   The patient reports reduced stamina since the PE and was discharged after a prolonged hospitalization with ecmo treatment.   Her sister shares that the patients weight escalated over the last 10 years. The patient reports that prior to hospitalization she was better. The sister had that her mobility and energy started reducing about 10 years ago.     Chiropractor: 1 session with chiropractor last week, usg therapy, very painful for the patient  The patient did not think it was helpful.   The chiropractor thought there was some improvement in ROM.   Pain physician: none  Surgery: none  Biofeedback: none  Acupuncture: none    Tests/Imaging reviewed with the patient:    Xray Shoulder 11/15/2022  AP 2, Grashey, transscapular Y, axillary  views of the right shoulder  were obtained. Axillary view is suboptimal due to underpenetration.     No  acute osseous abnormality. Inferior subluxation of the humeral head  relative to glenoid. Acromioclavicular joint is congruent.     Os acromiale. Moderate degenerative changes of the acromioclavicular  joint. Moderate to severe degenerative change of the glenohumeral  joint. Contour irregularity of the fifth and sixth ribs, likely from  prior trauma. Soft tissue is unremarkable. The visualized lung is  clear.                                                                      Impression:  1. No acute osseous abnormality.  2. Inferior subluxation of the humeral head relative to glenoid with  severe glenohumeral joint osteoarthrosis.  3. Os acromiale with moderate degenerative changes of  acromioclavicular joint.    Xray knee 12/31/2029  Exam: Single frontal view of both knees and a lateral and axial view  of the right knee dated 12/31/2019.     COMPARISON: 8/2/2017.     CLINICAL HISTORY: Right knee pain.     FINDINGS:      Single frontal view of both knees and a lateral and axial view of the  right knee were obtained.     Left knee: Post surgical changes of a left total knee arthroplasty  without complication.     Right knee: Tricompartmental osteophytic spurring in the right knee  with joint space narrowing in the medial femorotibial joint  compartment of the right knee. No large right knee joint effusion.                                                                      IMPRESSION:  1. Postsurgical changes of a left total knee arthroplasty without  complication.  2. Osteoarthrosis in the right knee with joint space narrowing,  greatest in the medial femorotibial joint compartment.    Significant Medical History:   Past Medical History:   Diagnosis Date     Chronic bronchitis (H) 07/30/2015     Contact dermatitis      Depressive disorder 1985     Eczema     HANDS     Elevated liver enzymes      H/O total knee replacement, left      History of total hip replacement 10/27/2011     Hyperlipidemia      Hypertension       Morbid obesity (H)      Osteoarthrosis     right knee     RA (rheumatoid arthritis) (H) 2018     Sleep apnea      Tobacco use disorder      Varicella 1965          Past Surgical History:  Past Surgical History:   Procedure Laterality Date     ARTHROPLASTY KNEE  2012    Procedure: ARTHROPLASTY KNEE;  Left Total Knee Arthroplasty;  Surgeon: Annette Quesada MD;  Location: [facility]     ARTHROSCOPY HIP Right      BRONCHOSCOPY FLEXIBLE AND RIGID N/A 2019    Procedure: Bronchoscopy flexible;  Surgeon: Patrick Rayo MD;  Location: [facility] OR     COLONOSCOPY N/A 2021    Procedure: COLONOSCOPY, WITH POLYPECTOMY AND BIOPSY;  Surgeon: Diogo Lynch MD;  Location: [facility] GI     CV EXTRACORPERAL MEMBRANE OXYGENATION N/A 2019    Procedure: Extracorporeal Membrance Oxygenation;  Surgeon: Kaleb Mcfarlane MD;  [facility]     INSERT EXTRACORPORAL MEMBRANE OXYGENATOR N/A 2019    Procedure: Venous-Venous cannulation using ultrasound guidance and transesophageal echocardiogram, venous-arterial ecmo decannulation and repair of left femoral artery;  Surgeon: Patrick Rayo MD;  Location: [facility] OR     IR MECH THROMB PRIM ART, NON-INTRACRNL  9/10/2019     IR PULMONARY ANGIOGRAM BILATERAL  9/10/2019     IR THROMBOLYSIS ARTERIAL INFUSION INITIAL DAY  9/10/2019     THORACENTESIS N/A 2020    Procedure: THORACENTESIS;  Surgeon: Georgina Richardson MD;  Location: [facility] GI     ZZC TOTAL HIP ARTHROPLASTY  04    RT          Family History:  Family History   Problem Relation Age of Onset     Thyroid Disease Mother      Hypertension Mother      Skin Cancer Mother         MMohs surgery with skin graft     Cerebrovascular Disease Mother         CVA after endarderectomy-      Diabetes Mother      Depression Mother      Alcoholism Father      Heart Disease Father      Substance Abuse Father              Heart Disease Sister         multiple ablations      Alcoholism Sister      Substance Abuse Sister         Sober     Depression Sister      Substance Abuse Sister         Sober 30 plus years     Depression Sister      Thyroid Disease Sister      Hypertension Maternal Grandmother      Depression Maternal Grandmother      Breast Cancer Paternal Grandmother      Pancreatic Cancer Paternal Grandmother      Prostate Cancer Paternal Grandfather      Cardiovascular Paternal Aunt      Cardiovascular Paternal Uncle      Depression Cousin      Depression Other           Social History:  Social History     Socioeconomic History     Marital status: Single     Spouse name: Not on file     Number of children: Not on file     Years of education: Not on file     Highest education level: Not on file   Occupational History     Occupation: registered nurse     Comment: [employer]   Tobacco Use     Smoking status: Former     Packs/day: 0.00     Years: 33.00     Pack years: 0.00     Types: Cigarettes     Start date: 1/1/1982     Quit date: 9/9/2019     Years since quitting: 3.3     Smokeless tobacco: Never   Vaping Use     Vaping Use: Never used   Substance and Sexual Activity     Alcohol use: Yes     Comment: Rarely     Drug use: No     Sexual activity: Not Currently     Birth control/protection: Abstinence, Post-menopausal   Other Topics Concern     Parent/sibling w/ CABG, MI or angioplasty before 65F 55M? Not Asked   Social History Narrative     Not on file     Social Determinants of Health     Financial Resource Strain: Not on file   Food Insecurity: Not on file   Transportation Needs: Not on file   Physical Activity: Not on file   Stress: Not on file   Social Connections: Not on file   Intimate Partner Violence: Not on file   Housing Stability: Not on file     Social History     Social History Narrative     Not on file          Allergies:  Allergies   Allergen Reactions     Adhesive Tape Blisters     Erythromycin Rash       Current Medications:   Current Outpatient Medications  "  Medication Sig Dispense Refill     acetaminophen (TYLENOL) 325 MG tablet Take 2 tablets (650 mg) by mouth every 6 hours as needed for mild pain or fever       albuterol (PROAIR HFA/PROVENTIL HFA/VENTOLIN HFA) 108 (90 Base) MCG/ACT inhaler Inhale 2 puffs into the lungs every 6 hours as needed for shortness of breath / dyspnea or wheezing 18 g 5     ARIPiprazole (ABILIFY) 10 MG tablet Take 1 tablet (10 mg) by mouth daily 90 tablet 1     DULoxetine (CYMBALTA) 60 MG capsule Take 1 capsule (60 mg) by mouth daily 30 capsule 0     insulin syringe-needle U-100 (BD INSULIN SYRINGE ULTRAFINE) 30G X 1/2\" 1 ML miscellaneous For Methotrexate use weekly. 12 each 3     lisinopril (ZESTRIL) 5 MG tablet Take 1 tablet (5 mg) by mouth daily 90 tablet 3     methotrexate 2.5 MG tablet Take 10 tablets (25 mg) by mouth every 7 days Labs every 8 - 12 weeks for refills. 40 tablet 2     methylPREDNISolone (MEDROL DOSEPAK) 4 MG tablet therapy pack Follow Package Directions 21 tablet 0     omeprazole (PRILOSEC) 20 MG DR capsule Take 1 capsule (20 mg) by mouth daily 30 capsule 2     traMADol (ULTRAM) 50 MG tablet Take 1 tablet (50 mg) by mouth every 6 hours as needed for severe pain (7-10) 10 tablet 0     VITAMIN D3 25 MCG (1000 UT) tablet TAKE 2 TABLETS BY MOUTH EVERY DAY       warfarin ANTICOAGULANT (COUMADIN) 5 MG tablet TAKE 1 TABLET BY MOUTH DAILY OR AS DIRECTED BY ANTICOAGULATION CLINIC 90 tablet 1     atorvastatin (LIPITOR) 40 MG tablet Take 1 tablet (40 mg) by mouth daily 90 tablet 3     enoxaparin ANTICOAGULANT (LOVENOX) 100 MG/ML syringe Inject 1 mL (100 mg) Subcutaneous 2 times daily 20 mL 1     methotrexate 50 MG/2ML injection Inject 1 mL (25 mg) Subcutaneous every 7 days 12 mL 5          Current Pain Medications:  Medications related to Pain Management (From now, onward)    None           Blood thinner:    Warfarin for PE.     Work History:    Current work status: not working currently. 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 She was let go in 2016. She had grounds for lawsuit. She went into depression. She felt discriminated against because of her reduced mobility after hip replacement surgery.     Watches tv all day.     Psychosocial History:     History of treatment for behavioral disorder: depression, works with a psychiatrist and therapist at [facility]  History of suicidal ideation or suicidal attempt: denies    Review of Systems:  Review of Systems   All other systems reviewed and are negative.        Physical Exam:     Vitals:    01/17/23 1010   BP: 102/69   BP Location: Right arm   Patient Position: Chair   Cuff Size: Adult Large   Pulse: 106   SpO2: 95%   Weight: 129.3 kg (285 lb)   Height: 1.676 m (5' 6\")       General Appearance: No distress, seated comfortably, high bmi  Mood: Euthymic  HE ENT: Non constricted pupils  Respiratory: Non labored breathing  Skin: No rashes over exposed skin  MS: left knee replacement well healed scar  Neuro: intact to light touch  Gait: in wheel chair    Laboratory results:  Recent Labs   Lab Test 12/07/22  1340 12/06/22  1049    139   POTASSIUM 4.5 5.0   CHLORIDE 105 106   CO2 14* 18*   ANIONGAP 18* 15   GLC 90 146*   BUN 55.0* 46.5*   CR 2.18* 2.11*   MARIA INES 9.3 9.7       CBC RESULTS:   Recent Labs   Lab Test 12/07/22  1340   WBC 10.6   RBC 4.66   HGB 13.4   HCT 41.1   MCV 88   MCH 28.8   MCHC 32.6   RDW 14.0            Imaging:       ASSESSMENT AND PLAN:     Encounter Diagnosis:    Chronic pain syndrome  Depression  High BMI  Longterm anticoagulation  RA      Shiraraheem Rodriguez is a 62 year old y.o. old female who presents to the pain clinic with severe pain multiple areas of the body    I have summarized the patient s past medical history, discussed their clinical findings and the potential differential diagnosis with the patient. Significant past medical history pertinent to the patient s current condition includes depression, pain with movement, isolation, " and loneliness.     The differential diagnosis discussed with the patient are listed above. I have discussed anatomy and possible sources of the pain using models and/or pictures (diagrams). I have discussed multi- disciplinary pain management options withthe patient as pertaining to their case as detailed above. The pain management options we discussed included, but were not limited to the recommendations below.  I also discussed with patient the risks, benefits and alternatives to each pain management option.  All of the patient s questions and concerns were answered to the best of my ability.    RECOMMENDATIONS:     1. Medications: A trial of gabapentin, or pregabalin can be considered to help support the patient with multiple areas of myofascial pain. I emphasized the importance of maximizing conservative management strategies including PT.     2. Procedure: None indicated at this time.     3. Physical therapy: Referral was provided for the sister nithin chronic pain program. The patient will benefit from structured program of goal setting and continuous support for chronic pain syndrome in the setting of depression    4. Referral for chiropractic care  5. Continue follow up with psychiatry and psychology. The patient is already in the [facility] system. I discussed having support from a pain psychologist.     6. We discussed briefly antiinflammatory diet and supplements such as turmeric.                                                                                                     Answers for HPI/ROS submitted by the patient on 1/10/2023  KATARZYNA 7 TOTAL SCORE: 3

## 2023-01-17 NOTE — NURSING NOTE
RN reviewed AVS with patient. Patient to contact clinic if any questions/concerns. Patient verbalized understanding.    Viridiana Grace RN

## 2023-01-17 NOTE — PATIENT INSTRUCTIONS
Referrals:    Referral to Pain Psychology at [facility]. Please call to schedule an appointment.      Referral to Chronic Pain Program at [facility]. Please call to schedule an appointment.      Chiropractic referral placed.         Recommended Follow up:      Follow up as needed.           To speak with a nurse, schedule/reschedule/cancel a clinic appointment, or request a medication refill call: [phone]    You can also reach us by [employer]: [email or web address]

## 2023-01-17 NOTE — LETTER
1/17/2023       RE: Shira Rodriguez  [address]  [place] [address]     Dear Colleague,    Thank you for referring your patient, Shira Rodriguez, to the [facility] FOR COMPREHENSIVE PAIN MANAGEMENT [place] at [facility]. Please see a copy of my visit note below.      Pain Clinic New Patient Consult Note:    Referring Provider: Qi   Primary care provider: Anjel Busby    Shira Rodriguez is a 62 year old y.o. old female who presents to the pain clinic with chronic pain in bilateral shoulders, right knee and lower back. She is coming to the clinic with her sister Kirsten.     HPI:  Patient Supplied Answers To the [facility] Pain Questionnaire  [facility] Pain -  Patient Entered Questionnaire/Answers 1/10/2023   What number best describes your pain right now:  0 = No pain  to  10 = Worst pain imaginable 5   How would you describe the pain? sharp, dull, aching   Which of the following worsen your pain? standing, walking, exercise   Which of the following improve or reduce your pain? sitting, medication   What number best describes your average pain for the past week:  0 = No pain  to  10 = Worst pain imaginable 6   What number best describes your LOWEST pain in past 24 hours:  0 = No pain  to  10 = Worst pain imaginable 0   What number best describes your WORST pain in past 24 hours:  0 = No pain  to  10 = Worst pain imaginable 8   When is your pain worst? Constant   What non-medicine treatments have you already had for your pain? other nerve blocks   Have you tried treating your pain with medication?  -   Are you currently taking medications for your pain? -     Shira presents to the pain clinic with PMH of RA and OA. The patient is coming with her sister Kirsten. They have been best friends since they were toddlers. Her sister has been an [employer] member in the past. The patient complains of bilateral shoulder pain, right knee pain.  She underwent right shoulder and right knee were injected by orthopedics in November 2022. She reports that the injection for the knee joint wore off. The shoulder joint injection was not helpful. The patient feels that any movement causes pain. The patieent avoids movement and finds herself sitting in a chair all day. She spends her day at home watching tv because that is all she is able to tolerate.     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 She was involved in an accident and had to undergo hip surgery. The patient feels that she was discriminated against and laid off the job shortly after her hip surgery. Her sister explains that the patient was in a cycle of depression and stayed alone. It seems that after being fired she never returned to work.     The patient continued to experience weight gain and loneliness and isolation worsened. The patient had an episode of PE and had prolonged hospitalization with ECMO treatment. After discharge she had some rehab therapy but was not motivated to keep up.      Pool therapy hoping at [facility]. She meets with a psychiatrist and psychologist at [facility]. The patient states that any movement causes pain and she does not want to move unless the pain is better.          Pain treatments:    Herbal medicines: none  Physical therapy: for stamina in 2022. She did not have shoulder pain at the time.   The patient reports reduced stamina since the PE and was discharged after a prolonged hospitalization with ecmo treatment.   Her sister shares that the patients weight escalated over the last 10 years. The patient reports that prior to hospitalization she was better. The sister had that her mobility and energy started reducing about 10 years ago.     Chiropractor: 1 session with chiropractor last week, usg therapy, very painful for the patient  The patient did not think it was helpful.    The chiropractor thought there was some improvement in ROM.   Pain physician: none  Surgery: none  Biofeedback: none  Acupuncture: none    Tests/Imaging reviewed with the patient:    Xray Shoulder 11/15/2022  AP 2, Grashey, transscapular Y, axillary  views of the right shoulder  were obtained. Axillary view is suboptimal due to underpenetration.     No acute osseous abnormality. Inferior subluxation of the humeral head  relative to glenoid. Acromioclavicular joint is congruent.     Os acromiale. Moderate degenerative changes of the acromioclavicular  joint. Moderate to severe degenerative change of the glenohumeral  joint. Contour irregularity of the fifth and sixth ribs, likely from  prior trauma. Soft tissue is unremarkable. The visualized lung is  clear.                                                                      Impression:  1. No acute osseous abnormality.  2. Inferior subluxation of the humeral head relative to glenoid with  severe glenohumeral joint osteoarthrosis.  3. Os acromiale with moderate degenerative changes of  acromioclavicular joint.    Xray knee 12/31/2029  Exam: Single frontal view of both knees and a lateral and axial view  of the right knee dated 12/31/2019.     COMPARISON: 8/2/2017.     CLINICAL HISTORY: Right knee pain.     FINDINGS:      Single frontal view of both knees and a lateral and axial view of the  right knee were obtained.     Left knee: Post surgical changes of a left total knee arthroplasty  without complication.     Right knee: Tricompartmental osteophytic spurring in the right knee  with joint space narrowing in the medial femorotibial joint  compartment of the right knee. No large right knee joint effusion.                                                                      IMPRESSION:  1. Postsurgical changes of a left total knee arthroplasty without  complication.  2. Osteoarthrosis in the right knee with joint space narrowing,  greatest in the medial  femorotibial joint compartment.    Significant Medical History:   Past Medical History:   Diagnosis Date     Chronic bronchitis (H) 07/30/2015     Contact dermatitis      Depressive disorder 1985     Eczema     HANDS     Elevated liver enzymes      H/O total knee replacement, left      History of total hip replacement 10/27/2011     Hyperlipidemia      Hypertension      Morbid obesity (H)      Osteoarthrosis     right knee     RA (rheumatoid arthritis) (H) 8/2018     Sleep apnea      Tobacco use disorder      Varicella 1965          Past Surgical History:  Past Surgical History:   Procedure Laterality Date     ARTHROPLASTY KNEE  6/14/2012    Procedure: ARTHROPLASTY KNEE;  Left Total Knee Arthroplasty;  Surgeon: Annette Quesada MD;  Location: [facility]     ARTHROSCOPY HIP Right      BRONCHOSCOPY FLEXIBLE AND RIGID N/A 9/17/2019    Procedure: Bronchoscopy flexible;  Surgeon: Patrick Rayo MD;  Location: [facility] OR     COLONOSCOPY N/A 7/16/2021    Procedure: COLONOSCOPY, WITH POLYPECTOMY AND BIOPSY;  Surgeon: Diogo Lynch MD;  [facility]     CV EXTRACORPERAL MEMBRANE OXYGENATION N/A 9/9/2019    Procedure: Extracorporeal Membrance Oxygenation;  Surgeon: Kaleb Mcfarlane MD;  Location:  [facility]     INSERT EXTRACORPORAL MEMBRANE OXYGENATOR N/A 9/17/2019    Procedure: Venous-Venous cannulation using ultrasound guidance and transesophageal echocardiogram, venous-arterial ecmo decannulation and repair of left femoral artery;  Surgeon: Patrick Rayo MD;  Location: [facility] OR     IR MECH THROMB PRIM ART, NON-INTRACRNL  9/10/2019     IR PULMONARY ANGIOGRAM BILATERAL  9/10/2019     IR THROMBOLYSIS ARTERIAL INFUSION INITIAL DAY  9/10/2019     THORACENTESIS N/A 1/16/2020    Procedure: THORACENTESIS;  Surgeon: Georgina Richardson MD;  [facility]: [facility]     [facility] TOTAL HIP ARTHROPLASTY  07/09/04    RT          Family History:  Family History   Problem Relation Age of Onset     Thyroid Disease  Mother      Hypertension Mother      Skin Cancer Mother         MMohs surgery with skin graft     Cerebrovascular Disease Mother         CVA after endarderectomy-      Diabetes Mother      Depression Mother      Alcoholism Father      Heart Disease Father      Substance Abuse Father              Heart Disease Sister         multiple ablations     Alcoholism Sister      Substance Abuse Sister         Sober     Depression Sister      Substance Abuse Sister         Sober 30 plus years     Depression Sister      Thyroid Disease Sister      Hypertension Maternal Grandmother      Depression Maternal Grandmother      Breast Cancer Paternal Grandmother      Pancreatic Cancer Paternal Grandmother      Prostate Cancer Paternal Grandfather      Cardiovascular Paternal Aunt      Cardiovascular Paternal Uncle      Depression Cousin      Depression Other           Social History:  Social History     Socioeconomic History     Marital status: Single     Spouse name: Not on file     Number of children: Not on file     Years of education: Not on file     Highest education level: Not on file   Occupational History     Occupation: registered nurse     Comment: [employer]   Tobacco Use     Smoking status: Former     Packs/day: 0.00     Years: 33.00     Pack years: 0.00     Types: Cigarettes     Start date: 1982     Quit date: 2019     Years since quitting: 3.3     Smokeless tobacco: Never   Vaping Use     Vaping Use: Never used   Substance and Sexual Activity     Alcohol use: Yes     Comment: Rarely     Drug use: No     Sexual activity: Not Currently     Birth control/protection: Abstinence, Post-menopausal   Other Topics Concern     Parent/sibling w/ CABG, MI or angioplasty before 65F 55M? Not Asked   Social History Narrative     Not on file     Social Determinants of Health     Financial Resource Strain: Not on file   Food Insecurity: Not on file   Transportation Needs: Not on file   Physical Activity:  "Not on file   Stress: Not on file   Social Connections: Not on file   Intimate Partner Violence: Not on file   Housing Stability: Not on file     Social History     Social History Narrative     Not on file          Allergies:  Allergies   Allergen Reactions     Adhesive Tape Blisters     Erythromycin Rash       Current Medications:   Current Outpatient Medications   Medication Sig Dispense Refill     acetaminophen (TYLENOL) 325 MG tablet Take 2 tablets (650 mg) by mouth every 6 hours as needed for mild pain or fever       albuterol (PROAIR HFA/PROVENTIL HFA/VENTOLIN HFA) 108 (90 Base) MCG/ACT inhaler Inhale 2 puffs into the lungs every 6 hours as needed for shortness of breath / dyspnea or wheezing 18 g 5     ARIPiprazole (ABILIFY) 10 MG tablet Take 1 tablet (10 mg) by mouth daily 90 tablet 1     DULoxetine (CYMBALTA) 60 MG capsule Take 1 capsule (60 mg) by mouth daily 30 capsule 0     insulin syringe-needle U-100 (BD INSULIN SYRINGE ULTRAFINE) 30G X 1/2\" 1 ML miscellaneous For Methotrexate use weekly. 12 each 3     lisinopril (ZESTRIL) 5 MG tablet Take 1 tablet (5 mg) by mouth daily 90 tablet 3     methotrexate 2.5 MG tablet Take 10 tablets (25 mg) by mouth every 7 days Labs every 8 - 12 weeks for refills. 40 tablet 2     methylPREDNISolone (MEDROL DOSEPAK) 4 MG tablet therapy pack Follow Package Directions 21 tablet 0     omeprazole (PRILOSEC) 20 MG DR capsule Take 1 capsule (20 mg) by mouth daily 30 capsule 2     traMADol (ULTRAM) 50 MG tablet Take 1 tablet (50 mg) by mouth every 6 hours as needed for severe pain (7-10) 10 tablet 0     VITAMIN D3 25 MCG (1000 UT) tablet TAKE 2 TABLETS BY MOUTH EVERY DAY       warfarin ANTICOAGULANT (COUMADIN) 5 MG tablet TAKE 1 TABLET BY MOUTH DAILY OR AS DIRECTED BY ANTICOAGULATION CLINIC 90 tablet 1     atorvastatin (LIPITOR) 40 MG tablet Take 1 tablet (40 mg) by mouth daily 90 tablet 3     enoxaparin ANTICOAGULANT (LOVENOX) 100 MG/ML syringe Inject 1 mL (100 mg) Subcutaneous 2 " "times daily 20 mL 1     methotrexate 50 MG/2ML injection Inject 1 mL (25 mg) Subcutaneous every 7 days 12 mL 5          Current Pain Medications:  Medications related to Pain Management (From now, onward)    None           Blood thinner:    Warfarin for PE.     Work History:    Current work status: not working currently. 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 She was let go in 2016. She had grounds for lawsuit. She went into depression. She felt discriminated against because of her reduced mobility after hip replacement surgery.     Watches tv all day.     Psychosocial History:     History of treatment for behavioral disorder: depression, works with a psychiatrist and therapist at [facility]  History of suicidal ideation or suicidal attempt: denies    Review of Systems:  Review of Systems   All other systems reviewed and are negative.        Physical Exam:     Vitals:    01/17/23 1010   BP: 102/69   BP Location: Right arm   Patient Position: Chair   Cuff Size: Adult Large   Pulse: 106   SpO2: 95%   Weight: 129.3 kg (285 lb)   Height: 1.676 m (5' 6\")       General Appearance: No distress, seated comfortably, high bmi  Mood: Euthymic  HE ENT: Non constricted pupils  Respiratory: Non labored breathing  Skin: No rashes over exposed skin  MS: left knee replacement well healed scar  Neuro: intact to light touch  Gait: in wheel chair    Laboratory results:  Recent Labs   Lab Test 12/07/22  1340 12/06/22  1049    139   POTASSIUM 4.5 5.0   CHLORIDE 105 106   CO2 14* 18*   ANIONGAP 18* 15   GLC 90 146*   BUN 55.0* 46.5*   CR 2.18* 2.11*   MARIA INES 9.3 9.7       CBC RESULTS:   Recent Labs   Lab Test 12/07/22  1340   WBC 10.6   RBC 4.66   HGB 13.4   HCT 41.1   MCV 88   MCH 28.8   MCHC 32.6   RDW 14.0            Imaging:       ASSESSMENT AND PLAN:     Encounter Diagnosis:    Chronic pain syndrome  Depression  High BMI  Longterm anticoagulation  RA      Shira Rodriguez is a 62 year old y.o. old " female who presents to the pain clinic with severe pain multiple areas of the body    I have summarized the patient s past medical history, discussed their clinical findings and the potential differential diagnosis with the patient. Significant past medical history pertinent to the patient s current condition includes depression, pain with movement, isolation, and loneliness.     The differential diagnosis discussed with the patient are listed above. I have discussed anatomy and possible sources of the pain using models and/or pictures (diagrams). I have discussed multi- disciplinary pain management options withthe patient as pertaining to their case as detailed above. The pain management options we discussed included, but were not limited to the recommendations below.  I also discussed with patient the risks, benefits and alternatives to each pain management option.  All of the patient s questions and concerns were answered to the best of my ability.    RECOMMENDATIONS:     1. Medications: A trial of gabapentin, or pregabalin can be considered to help support the patient with multiple areas of myofascial pain. I emphasized the importance of maximizing conservative management strategies including PT.     2. Procedure: None indicated at this time.     3. Physical therapy: Referral was provided for the sister nithin chronic pain program. The patient will benefit from structured program of goal setting and continuous support for chronic pain syndrome in the setting of depression    4. Referral for chiropractic care  5. Continue follow up with psychiatry and psychology. The patient is already in the [facility] system. I discussed having support from a pain psychologist.     6. We discussed briefly antiinflammatory diet and supplements such as turmeric.           Answers for HPI/ROS submitted by the patient on 1/10/2023  KATARZYNA 7 TOTAL SCORE: 3        Sincerely,    Nancy Ignacio MD

## 2023-01-28 ENCOUNTER — HOSPITAL ENCOUNTER (INPATIENT)
Facility: CLINIC | Age: 63
LOS: 23 days | Discharge: NURSING, REHAB OR LONG-TERM CARE | DRG: 871 | End: 2023-02-21
Attending: EMERGENCY MEDICINE | Admitting: HOSPITALIST
Payer: COMMERCIAL

## 2023-01-28 ENCOUNTER — APPOINTMENT (OUTPATIENT)
Dept: GENERAL RADIOLOGY | Facility: CLINIC | Age: 63
DRG: 871 | End: 2023-01-28
Attending: EMERGENCY MEDICINE
Payer: COMMERCIAL

## 2023-01-28 DIAGNOSIS — K21.00 GASTROESOPHAGEAL REFLUX DISEASE WITH ESOPHAGITIS WITHOUT HEMORRHAGE: ICD-10-CM

## 2023-01-28 DIAGNOSIS — D61.818 OTHER PANCYTOPENIA (H): Primary | ICD-10-CM

## 2023-01-28 DIAGNOSIS — R50.81 NEUTROPENIC FEVER (H): ICD-10-CM

## 2023-01-28 DIAGNOSIS — A41.9 BACTERIAL SEPSIS (H): ICD-10-CM

## 2023-01-28 DIAGNOSIS — B37.2 CANDIDIASIS OF SKIN: ICD-10-CM

## 2023-01-28 DIAGNOSIS — I27.82 CHRONIC PULMONARY EMBOLISM, UNSPECIFIED PULMONARY EMBOLISM TYPE, UNSPECIFIED WHETHER ACUTE COR PULMONALE PRESENT (H): ICD-10-CM

## 2023-01-28 DIAGNOSIS — D70.9 NEUTROPENIC FEVER (H): ICD-10-CM

## 2023-01-28 DIAGNOSIS — T45.1X1A METHOTREXATE TOXICITY, ACCIDENTAL OR UNINTENTIONAL, INITIAL ENCOUNTER: ICD-10-CM

## 2023-01-28 DIAGNOSIS — E56.9 VITAMIN DEFICIENCY: ICD-10-CM

## 2023-01-28 DIAGNOSIS — Z86.711 HISTORY OF PULMONARY EMBOLISM: ICD-10-CM

## 2023-01-28 DIAGNOSIS — G47.00 INSOMNIA, UNSPECIFIED TYPE: ICD-10-CM

## 2023-01-28 DIAGNOSIS — E78.2 MIXED HYPERLIPIDEMIA: ICD-10-CM

## 2023-01-28 DIAGNOSIS — E53.8 VITAMIN B12 DEFICIENCY (NON ANEMIC): ICD-10-CM

## 2023-01-28 DIAGNOSIS — M06.9 RHEUMATOID ARTHRITIS, INVOLVING UNSPECIFIED SITE, UNSPECIFIED WHETHER RHEUMATOID FACTOR PRESENT (H): ICD-10-CM

## 2023-01-28 DIAGNOSIS — D51.0 PERNICIOUS ANEMIA: ICD-10-CM

## 2023-01-28 DIAGNOSIS — K59.00 CONSTIPATION, UNSPECIFIED CONSTIPATION TYPE: ICD-10-CM

## 2023-01-28 LAB
ABO/RH(D): NORMAL
ALBUMIN SERPL BCG-MCNC: 2.5 G/DL (ref 3.5–5.2)
ALBUMIN UR-MCNC: 30 MG/DL
ALP SERPL-CCNC: 57 U/L (ref 35–104)
ALT SERPL W P-5'-P-CCNC: 13 U/L (ref 10–35)
AMORPH CRY #/AREA URNS HPF: ABNORMAL /HPF
ANION GAP SERPL CALCULATED.3IONS-SCNC: 16 MMOL/L (ref 7–15)
ANTIBODY SCREEN: NEGATIVE
APPEARANCE UR: ABNORMAL
AST SERPL W P-5'-P-CCNC: 28 U/L (ref 10–35)
BASOPHILS # BLD MANUAL: 0 10E3/UL (ref 0–0.2)
BASOPHILS NFR BLD MANUAL: 0 %
BILIRUB SERPL-MCNC: 0.7 MG/DL
BILIRUB UR QL STRIP: NEGATIVE
BUN SERPL-MCNC: 55.5 MG/DL (ref 8–23)
CALCIUM SERPL-MCNC: 8.9 MG/DL (ref 8.8–10.2)
CHLORIDE SERPL-SCNC: 103 MMOL/L (ref 98–107)
CK SERPL-CCNC: 305 U/L (ref 26–192)
COLOR UR AUTO: ABNORMAL
CREAT SERPL-MCNC: 3.25 MG/DL (ref 0.51–0.95)
DEPRECATED HCO3 PLAS-SCNC: 13 MMOL/L (ref 22–29)
EOSINOPHIL # BLD MANUAL: 0 10E3/UL (ref 0–0.7)
EOSINOPHIL NFR BLD MANUAL: 1 %
ETHANOL SERPL-MCNC: <0.01 G/DL
GFR SERPL CREATININE-BSD FRML MDRD: 16 ML/MIN/1.73M2
GLUCOSE SERPL-MCNC: 84 MG/DL (ref 70–99)
GLUCOSE UR STRIP-MCNC: NEGATIVE MG/DL
HCO3 BLDV-SCNC: 13 MMOL/L (ref 21–28)
HGB UR QL STRIP: ABNORMAL
HOLD SPECIMEN: NORMAL
HYALINE CASTS: 3 /LPF
INR PPP: 1.99 (ref 0.85–1.15)
KETONES UR STRIP-MCNC: NEGATIVE MG/DL
LACTATE BLD-SCNC: 3.2 MMOL/L
LEUKOCYTE ESTERASE UR QL STRIP: NEGATIVE
LYMPHOCYTES # BLD MANUAL: 0.3 10E3/UL (ref 0.8–5.3)
LYMPHOCYTES NFR BLD MANUAL: 72 %
MAGNESIUM SERPL-MCNC: 1.4 MG/DL (ref 1.7–2.3)
MONOCYTES # BLD MANUAL: 0 10E3/UL (ref 0–1.3)
MONOCYTES NFR BLD MANUAL: 3 %
MUCOUS THREADS #/AREA URNS LPF: PRESENT /LPF
NEUTROPHILS # BLD MANUAL: 0.1 10E3/UL (ref 1.6–8.3)
NEUTROPHILS NFR BLD MANUAL: 24 %
NITRATE UR QL: NEGATIVE
PCO2 BLDV: 29 MM HG (ref 40–50)
PH BLDV: 7.25 [PH] (ref 7.32–7.43)
PH UR STRIP: 5 [PH] (ref 5–7)
PLAT MORPH BLD: ABNORMAL
PO2 BLDV: 27 MM HG (ref 25–47)
POTASSIUM SERPL-SCNC: 5.2 MMOL/L (ref 3.4–5.3)
PROT SERPL-MCNC: 6.1 G/DL (ref 6.4–8.3)
RBC MORPH BLD: ABNORMAL
RBC URINE: 5 /HPF
SAO2 % BLDV: 42 % (ref 94–100)
SODIUM SERPL-SCNC: 132 MMOL/L (ref 136–145)
SP GR UR STRIP: 1.01 (ref 1–1.03)
SPECIMEN EXPIRATION DATE: NORMAL
SQUAMOUS EPITHELIAL: 3 /HPF
TROPONIN T SERPL HS-MCNC: 48 NG/L
UROBILINOGEN UR STRIP-MCNC: NORMAL MG/DL
VARIANT LYMPHS BLD QL SMEAR: PRESENT
WBC URINE: 0 /HPF

## 2023-01-28 PROCEDURE — 87637 SARSCOV2&INF A&B&RSV AMP PRB: CPT | Performed by: EMERGENCY MEDICINE

## 2023-01-28 PROCEDURE — 80053 COMPREHEN METABOLIC PANEL: CPT | Performed by: EMERGENCY MEDICINE

## 2023-01-28 PROCEDURE — 85007 BL SMEAR W/DIFF WBC COUNT: CPT | Performed by: EMERGENCY MEDICINE

## 2023-01-28 PROCEDURE — 258N000003 HC RX IP 258 OP 636: Performed by: EMERGENCY MEDICINE

## 2023-01-28 PROCEDURE — 96361 HYDRATE IV INFUSION ADD-ON: CPT

## 2023-01-28 PROCEDURE — 99291 CRITICAL CARE FIRST HOUR: CPT | Mod: 25

## 2023-01-28 PROCEDURE — 71045 X-RAY EXAM CHEST 1 VIEW: CPT

## 2023-01-28 PROCEDURE — 250N000011 HC RX IP 250 OP 636: Performed by: EMERGENCY MEDICINE

## 2023-01-28 PROCEDURE — 87040 BLOOD CULTURE FOR BACTERIA: CPT | Performed by: EMERGENCY MEDICINE

## 2023-01-28 PROCEDURE — 82550 ASSAY OF CK (CPK): CPT | Performed by: EMERGENCY MEDICINE

## 2023-01-28 PROCEDURE — C9803 HOPD COVID-19 SPEC COLLECT: HCPCS

## 2023-01-28 PROCEDURE — 96365 THER/PROPH/DIAG IV INF INIT: CPT

## 2023-01-28 PROCEDURE — 99292 CRITICAL CARE ADDL 30 MIN: CPT

## 2023-01-28 PROCEDURE — 36415 COLL VENOUS BLD VENIPUNCTURE: CPT | Performed by: EMERGENCY MEDICINE

## 2023-01-28 PROCEDURE — 93005 ELECTROCARDIOGRAM TRACING: CPT

## 2023-01-28 PROCEDURE — 84484 ASSAY OF TROPONIN QUANT: CPT | Performed by: EMERGENCY MEDICINE

## 2023-01-28 PROCEDURE — 85048 AUTOMATED LEUKOCYTE COUNT: CPT | Performed by: EMERGENCY MEDICINE

## 2023-01-28 PROCEDURE — 81001 URINALYSIS AUTO W/SCOPE: CPT | Performed by: EMERGENCY MEDICINE

## 2023-01-28 PROCEDURE — 87385 HISTOPLASMA CAPSUL AG IA: CPT | Performed by: INTERNAL MEDICINE

## 2023-01-28 PROCEDURE — 86901 BLOOD TYPING SEROLOGIC RH(D): CPT | Performed by: EMERGENCY MEDICINE

## 2023-01-28 PROCEDURE — 86850 RBC ANTIBODY SCREEN: CPT | Performed by: EMERGENCY MEDICINE

## 2023-01-28 PROCEDURE — 87086 URINE CULTURE/COLONY COUNT: CPT | Performed by: EMERGENCY MEDICINE

## 2023-01-28 PROCEDURE — 82803 BLOOD GASES ANY COMBINATION: CPT

## 2023-01-28 PROCEDURE — 85610 PROTHROMBIN TIME: CPT | Performed by: EMERGENCY MEDICINE

## 2023-01-28 PROCEDURE — 86923 COMPATIBILITY TEST ELECTRIC: CPT | Performed by: HOSPITALIST

## 2023-01-28 PROCEDURE — 82077 ASSAY SPEC XCP UR&BREATH IA: CPT | Performed by: EMERGENCY MEDICINE

## 2023-01-28 PROCEDURE — 83735 ASSAY OF MAGNESIUM: CPT | Performed by: EMERGENCY MEDICINE

## 2023-01-28 PROCEDURE — 250N000013 HC RX MED GY IP 250 OP 250 PS 637: Performed by: EMERGENCY MEDICINE

## 2023-01-28 RX ADMIN — MICONAZOLE NITRATE: 2 POWDER TOPICAL at 23:38

## 2023-01-28 RX ADMIN — CEFEPIME HYDROCHLORIDE 2 G: 2 INJECTION, POWDER, FOR SOLUTION INTRAVENOUS at 23:53

## 2023-01-28 RX ADMIN — SODIUM CHLORIDE 2000 ML: 9 INJECTION, SOLUTION INTRAVENOUS at 22:25

## 2023-01-28 ASSESSMENT — ACTIVITIES OF DAILY LIVING (ADL): ADLS_ACUITY_SCORE: 35

## 2023-01-29 ENCOUNTER — APPOINTMENT (OUTPATIENT)
Dept: CT IMAGING | Facility: CLINIC | Age: 63
DRG: 871 | End: 2023-01-29
Attending: EMERGENCY MEDICINE
Payer: COMMERCIAL

## 2023-01-29 ENCOUNTER — APPOINTMENT (OUTPATIENT)
Dept: CT IMAGING | Facility: CLINIC | Age: 63
DRG: 871 | End: 2023-01-29
Attending: HOSPITALIST
Payer: COMMERCIAL

## 2023-01-29 PROBLEM — R50.81 NEUTROPENIC FEVER (H): Status: ACTIVE | Noted: 2023-01-29

## 2023-01-29 PROBLEM — A41.9 BACTERIAL SEPSIS (H): Status: ACTIVE | Noted: 2023-01-29

## 2023-01-29 PROBLEM — D70.9 NEUTROPENIC FEVER (H): Status: ACTIVE | Noted: 2023-01-29

## 2023-01-29 LAB
ANION GAP SERPL CALCULATED.3IONS-SCNC: 15 MMOL/L (ref 7–15)
APTT PPP: 42 SECONDS (ref 22–38)
BUN SERPL-MCNC: 54.6 MG/DL (ref 8–23)
BURR CELLS BLD QL SMEAR: SLIGHT
CALCIUM SERPL-MCNC: 8.4 MG/DL (ref 8.8–10.2)
CHLORIDE SERPL-SCNC: 108 MMOL/L (ref 98–107)
CORTIS SERPL-MCNC: 43.9 UG/DL
CREAT SERPL-MCNC: 2.88 MG/DL (ref 0.51–0.95)
CREAT UR-MCNC: 61.3 MG/DL
D DIMER PPP FEU-MCNC: 7.38 UG/ML FEU (ref 0–0.5)
DEPRECATED HCO3 PLAS-SCNC: 12 MMOL/L (ref 22–29)
ELLIPTOCYTES BLD QL SMEAR: SLIGHT
ELLIPTOCYTES BLD QL SMEAR: SLIGHT
ERYTHROCYTE [DISTWIDTH] IN BLOOD BY AUTOMATED COUNT: 13.6 % (ref 10–15)
ERYTHROCYTE [DISTWIDTH] IN BLOOD BY AUTOMATED COUNT: 13.6 % (ref 10–15)
ERYTHROCYTE [DISTWIDTH] IN BLOOD BY AUTOMATED COUNT: 13.8 % (ref 10–15)
FERRITIN SERPL-MCNC: 1499 NG/ML (ref 11–328)
FIBRINOGEN PPP-MCNC: 932 MG/DL (ref 170–490)
FLUAV RNA SPEC QL NAA+PROBE: NEGATIVE
FLUBV RNA RESP QL NAA+PROBE: NEGATIVE
FOLATE SERPL-MCNC: 2.8 NG/ML (ref 4.6–34.8)
FRACT EXCRET NA UR+SERPL-RTO: 1.3 %
GFR SERPL CREATININE-BSD FRML MDRD: 18 ML/MIN/1.73M2
GLUCOSE SERPL-MCNC: 73 MG/DL (ref 70–99)
HCT VFR BLD AUTO: 24.7 % (ref 35–47)
HCT VFR BLD AUTO: 28.3 % (ref 35–47)
HCT VFR BLD AUTO: 29.7 % (ref 35–47)
HGB BLD-MCNC: 8.1 G/DL (ref 11.7–15.7)
HGB BLD-MCNC: 9.2 G/DL (ref 11.7–15.7)
HGB BLD-MCNC: 9.4 G/DL (ref 11.7–15.7)
HOLD SPECIMEN: NORMAL
IRON BINDING CAPACITY (ROCHE): 117 UG/DL (ref 240–430)
IRON SATN MFR SERPL: 40 % (ref 15–46)
IRON SERPL-MCNC: 47 UG/DL (ref 37–145)
LACTATE SERPL-SCNC: 2 MMOL/L (ref 0.7–2)
LDH SERPL L TO P-CCNC: 143 U/L (ref 0–250)
LIPASE SERPL-CCNC: 10 U/L (ref 13–60)
MCH RBC QN AUTO: 28.5 PG (ref 26.5–33)
MCH RBC QN AUTO: 28.9 PG (ref 26.5–33)
MCH RBC QN AUTO: 29.1 PG (ref 26.5–33)
MCHC RBC AUTO-ENTMCNC: 31.6 G/DL (ref 31.5–36.5)
MCHC RBC AUTO-ENTMCNC: 32.5 G/DL (ref 31.5–36.5)
MCHC RBC AUTO-ENTMCNC: 32.8 G/DL (ref 31.5–36.5)
MCV RBC AUTO: 89 FL (ref 78–100)
MCV RBC AUTO: 89 FL (ref 78–100)
MCV RBC AUTO: 90 FL (ref 78–100)
PLAT MORPH BLD: ABNORMAL
PLAT MORPH BLD: ABNORMAL
PLATELET # BLD AUTO: 31 10E3/UL (ref 150–450)
PLATELET # BLD AUTO: 59 10E3/UL (ref 150–450)
PLATELET # BLD AUTO: ABNORMAL 10*3/UL
POTASSIUM SERPL-SCNC: 4.6 MMOL/L (ref 3.4–5.3)
RBC # BLD AUTO: 2.78 10E6/UL (ref 3.8–5.2)
RBC # BLD AUTO: 3.18 10E6/UL (ref 3.8–5.2)
RBC # BLD AUTO: 3.3 10E6/UL (ref 3.8–5.2)
RBC MORPH BLD: ABNORMAL
RBC MORPH BLD: ABNORMAL
RETICS # AUTO: 0.01 10E6/UL (ref 0.03–0.1)
RETICS # AUTO: 0.01 10E6/UL (ref 0.03–0.1)
RETICS/RBC NFR AUTO: 0.2 % (ref 0.5–2)
RETICS/RBC NFR AUTO: 0.2 % (ref 0.5–2)
RSV RNA SPEC NAA+PROBE: NEGATIVE
SARS-COV-2 RNA RESP QL NAA+PROBE: NEGATIVE
SODIUM SERPL-SCNC: 135 MMOL/L (ref 136–145)
SODIUM UR-SCNC: 36 MMOL/L
TOTAL PROTEIN SERUM FOR ELP: 4.5 G/DL (ref 6.4–8.3)
TROPONIN T SERPL HS-MCNC: 46 NG/L
URATE SERPL-MCNC: 8.9 MG/DL (ref 2.4–5.7)
VIT B12 SERPL-MCNC: <150 PG/ML (ref 232–1245)
WBC # BLD AUTO: 0.2 10E3/UL (ref 4–11)
WBC # BLD AUTO: 0.3 10E3/UL (ref 4–11)
WBC # BLD AUTO: 0.4 10E3/UL (ref 4–11)

## 2023-01-29 PROCEDURE — 86665 EPSTEIN-BARR CAPSID VCA: CPT | Performed by: INTERNAL MEDICINE

## 2023-01-29 PROCEDURE — 86757 RICKETTSIA ANTIBODY: CPT | Performed by: INTERNAL MEDICINE

## 2023-01-29 PROCEDURE — 84484 ASSAY OF TROPONIN QUANT: CPT | Performed by: HOSPITALIST

## 2023-01-29 PROCEDURE — 88185 FLOWCYTOMETRY/TC ADD-ON: CPT | Performed by: INTERNAL MEDICINE

## 2023-01-29 PROCEDURE — 258N000002 HC RX IP 258 OP 250: Performed by: INTERNAL MEDICINE

## 2023-01-29 PROCEDURE — 99223 1ST HOSP IP/OBS HIGH 75: CPT | Performed by: INTERNAL MEDICINE

## 2023-01-29 PROCEDURE — 82607 VITAMIN B-12: CPT | Performed by: INTERNAL MEDICINE

## 2023-01-29 PROCEDURE — 99222 1ST HOSP IP/OBS MODERATE 55: CPT | Performed by: INTERNAL MEDICINE

## 2023-01-29 PROCEDURE — 83550 IRON BINDING TEST: CPT | Performed by: INTERNAL MEDICINE

## 2023-01-29 PROCEDURE — C9113 INJ PANTOPRAZOLE SODIUM, VIA: HCPCS | Performed by: HOSPITALIST

## 2023-01-29 PROCEDURE — 99207 PR NO BILLABLE SERVICE THIS VISIT: CPT | Performed by: INTERNAL MEDICINE

## 2023-01-29 PROCEDURE — 85048 AUTOMATED LEUKOCYTE COUNT: CPT | Performed by: HOSPITALIST

## 2023-01-29 PROCEDURE — 80048 BASIC METABOLIC PNL TOTAL CA: CPT | Performed by: HOSPITALIST

## 2023-01-29 PROCEDURE — 85045 AUTOMATED RETICULOCYTE COUNT: CPT | Performed by: INTERNAL MEDICINE

## 2023-01-29 PROCEDURE — 36415 COLL VENOUS BLD VENIPUNCTURE: CPT | Performed by: EMERGENCY MEDICINE

## 2023-01-29 PROCEDURE — P9035 PLATELET PHERES LEUKOREDUCED: HCPCS

## 2023-01-29 PROCEDURE — 87468 ANAPLSMA PHGCYTOPHLM AMP PRB: CPT | Performed by: INTERNAL MEDICINE

## 2023-01-29 PROCEDURE — 86663 EPSTEIN-BARR ANTIBODY: CPT | Performed by: INTERNAL MEDICINE

## 2023-01-29 PROCEDURE — 250N000011 HC RX IP 250 OP 636: Performed by: EMERGENCY MEDICINE

## 2023-01-29 PROCEDURE — 83690 ASSAY OF LIPASE: CPT | Performed by: EMERGENCY MEDICINE

## 2023-01-29 PROCEDURE — 88184 FLOWCYTOMETRY/ TC 1 MARKER: CPT | Performed by: INTERNAL MEDICINE

## 2023-01-29 PROCEDURE — 36415 COLL VENOUS BLD VENIPUNCTURE: CPT | Mod: TC | Performed by: INTERNAL MEDICINE

## 2023-01-29 PROCEDURE — 250N000011 HC RX IP 250 OP 636: Performed by: INTERNAL MEDICINE

## 2023-01-29 PROCEDURE — 250N000013 HC RX MED GY IP 250 OP 250 PS 637: Performed by: HOSPITALIST

## 2023-01-29 PROCEDURE — 120N000001 HC R&B MED SURG/OB

## 2023-01-29 PROCEDURE — 99223 1ST HOSP IP/OBS HIGH 75: CPT | Mod: AI | Performed by: HOSPITALIST

## 2023-01-29 PROCEDURE — 87449 NOS EACH ORGANISM AG IA: CPT | Performed by: INTERNAL MEDICINE

## 2023-01-29 PROCEDURE — 82728 ASSAY OF FERRITIN: CPT | Performed by: INTERNAL MEDICINE

## 2023-01-29 PROCEDURE — 36415 COLL VENOUS BLD VENIPUNCTURE: CPT | Performed by: HOSPITALIST

## 2023-01-29 PROCEDURE — 250N000013 HC RX MED GY IP 250 OP 250 PS 637: Performed by: INTERNAL MEDICINE

## 2023-01-29 PROCEDURE — 70450 CT HEAD/BRAIN W/O DYE: CPT

## 2023-01-29 PROCEDURE — 86666 EHRLICHIA ANTIBODY: CPT | Performed by: INTERNAL MEDICINE

## 2023-01-29 PROCEDURE — 83605 ASSAY OF LACTIC ACID: CPT | Performed by: EMERGENCY MEDICINE

## 2023-01-29 PROCEDURE — 86645 CMV ANTIBODY IGM: CPT | Performed by: INTERNAL MEDICINE

## 2023-01-29 PROCEDURE — 85384 FIBRINOGEN ACTIVITY: CPT | Performed by: INTERNAL MEDICINE

## 2023-01-29 PROCEDURE — 250N000013 HC RX MED GY IP 250 OP 250 PS 637: Performed by: EMERGENCY MEDICINE

## 2023-01-29 PROCEDURE — 87385 HISTOPLASMA CAPSUL AG IA: CPT | Performed by: INTERNAL MEDICINE

## 2023-01-29 PROCEDURE — 82746 ASSAY OF FOLIC ACID SERUM: CPT | Performed by: INTERNAL MEDICINE

## 2023-01-29 PROCEDURE — 82784 ASSAY IGA/IGD/IGG/IGM EACH: CPT | Performed by: INTERNAL MEDICINE

## 2023-01-29 PROCEDURE — 84300 ASSAY OF URINE SODIUM: CPT | Performed by: HOSPITALIST

## 2023-01-29 PROCEDURE — 85018 HEMOGLOBIN: CPT | Performed by: HOSPITALIST

## 2023-01-29 PROCEDURE — 84165 PROTEIN E-PHORESIS SERUM: CPT | Mod: 26

## 2023-01-29 PROCEDURE — 82533 TOTAL CORTISOL: CPT | Performed by: HOSPITALIST

## 2023-01-29 PROCEDURE — 83615 LACTATE (LD) (LDH) ENZYME: CPT | Performed by: INTERNAL MEDICINE

## 2023-01-29 PROCEDURE — 250N000011 HC RX IP 250 OP 636: Performed by: HOSPITALIST

## 2023-01-29 PROCEDURE — 258N000003 HC RX IP 258 OP 636: Performed by: EMERGENCY MEDICINE

## 2023-01-29 PROCEDURE — 71250 CT THORAX DX C-: CPT

## 2023-01-29 PROCEDURE — 85379 FIBRIN DEGRADATION QUANT: CPT | Performed by: INTERNAL MEDICINE

## 2023-01-29 PROCEDURE — 84550 ASSAY OF BLOOD/URIC ACID: CPT | Performed by: INTERNAL MEDICINE

## 2023-01-29 PROCEDURE — 86334 IMMUNOFIX E-PHORESIS SERUM: CPT | Performed by: STUDENT IN AN ORGANIZED HEALTH CARE EDUCATION/TRAINING PROGRAM

## 2023-01-29 PROCEDURE — 87389 HIV-1 AG W/HIV-1&-2 AB AG IA: CPT | Performed by: STUDENT IN AN ORGANIZED HEALTH CARE EDUCATION/TRAINING PROGRAM

## 2023-01-29 PROCEDURE — 83521 IG LIGHT CHAINS FREE EACH: CPT | Performed by: INTERNAL MEDICINE

## 2023-01-29 PROCEDURE — 250N000009 HC RX 250: Performed by: INTERNAL MEDICINE

## 2023-01-29 PROCEDURE — 85730 THROMBOPLASTIN TIME PARTIAL: CPT | Performed by: INTERNAL MEDICINE

## 2023-01-29 PROCEDURE — 86334 IMMUNOFIX E-PHORESIS SERUM: CPT | Mod: 26

## 2023-01-29 PROCEDURE — 86664 EPSTEIN-BARR NUCLEAR ANTIGEN: CPT | Performed by: INTERNAL MEDICINE

## 2023-01-29 PROCEDURE — 84165 PROTEIN E-PHORESIS SERUM: CPT | Mod: TC | Performed by: STUDENT IN AN ORGANIZED HEALTH CARE EDUCATION/TRAINING PROGRAM

## 2023-01-29 PROCEDURE — 88189 FLOWCYTOMETRY/READ 16 & >: CPT | Mod: GC | Performed by: STUDENT IN AN ORGANIZED HEALTH CARE EDUCATION/TRAINING PROGRAM

## 2023-01-29 PROCEDURE — 86644 CMV ANTIBODY: CPT | Performed by: INTERNAL MEDICINE

## 2023-01-29 PROCEDURE — 84155 ASSAY OF PROTEIN SERUM: CPT | Performed by: INTERNAL MEDICINE

## 2023-01-29 PROCEDURE — 87799 DETECT AGENT NOS DNA QUANT: CPT | Performed by: INTERNAL MEDICINE

## 2023-01-29 PROCEDURE — 87529 HSV DNA AMP PROBE: CPT | Performed by: INTERNAL MEDICINE

## 2023-01-29 PROCEDURE — 258N000003 HC RX IP 258 OP 636: Performed by: HOSPITALIST

## 2023-01-29 RX ORDER — VANCOMYCIN HYDROCHLORIDE 500 MG/10ML
500 INJECTION, POWDER, LYOPHILIZED, FOR SOLUTION INTRAVENOUS EVERY 24 HOURS
Status: DISCONTINUED | OUTPATIENT
Start: 2023-01-29 | End: 2023-01-29 | Stop reason: DRUGHIGH

## 2023-01-29 RX ORDER — DULOXETIN HYDROCHLORIDE 60 MG/1
120 CAPSULE, DELAYED RELEASE ORAL DAILY
Status: DISCONTINUED | OUTPATIENT
Start: 2023-01-29 | End: 2023-02-21 | Stop reason: HOSPADM

## 2023-01-29 RX ORDER — ACETAMINOPHEN 650 MG/1
650 SUPPOSITORY RECTAL EVERY 6 HOURS PRN
Status: DISCONTINUED | OUTPATIENT
Start: 2023-01-29 | End: 2023-02-21 | Stop reason: HOSPADM

## 2023-01-29 RX ORDER — ACETAMINOPHEN 650 MG/1
650 SUPPOSITORY RECTAL EVERY 4 HOURS PRN
Status: DISCONTINUED | OUTPATIENT
Start: 2023-01-29 | End: 2023-01-29

## 2023-01-29 RX ORDER — NITROGLYCERIN 0.4 MG/1
0.4 TABLET SUBLINGUAL EVERY 5 MIN PRN
Status: DISCONTINUED | OUTPATIENT
Start: 2023-01-29 | End: 2023-02-21 | Stop reason: HOSPADM

## 2023-01-29 RX ORDER — CYANOCOBALAMIN 1000 UG/ML
1000 INJECTION, SOLUTION INTRAMUSCULAR; SUBCUTANEOUS DAILY
Status: COMPLETED | OUTPATIENT
Start: 2023-01-29 | End: 2023-02-04

## 2023-01-29 RX ORDER — LANOLIN ALCOHOL/MO/W.PET/CERES
3 CREAM (GRAM) TOPICAL
Status: DISCONTINUED | OUTPATIENT
Start: 2023-01-29 | End: 2023-02-21 | Stop reason: HOSPADM

## 2023-01-29 RX ORDER — ACETAMINOPHEN 325 MG/1
650 TABLET ORAL EVERY 4 HOURS PRN
Status: DISCONTINUED | OUTPATIENT
Start: 2023-01-29 | End: 2023-01-29

## 2023-01-29 RX ORDER — ONDANSETRON 2 MG/ML
4 INJECTION INTRAMUSCULAR; INTRAVENOUS EVERY 6 HOURS PRN
Status: DISCONTINUED | OUTPATIENT
Start: 2023-01-29 | End: 2023-02-21 | Stop reason: HOSPADM

## 2023-01-29 RX ORDER — LIDOCAINE 40 MG/G
CREAM TOPICAL
Status: DISCONTINUED | OUTPATIENT
Start: 2023-01-29 | End: 2023-02-21 | Stop reason: HOSPADM

## 2023-01-29 RX ORDER — LIDOCAINE 40 MG/G
CREAM TOPICAL
Status: DISCONTINUED | OUTPATIENT
Start: 2023-01-29 | End: 2023-01-31

## 2023-01-29 RX ORDER — ACETAMINOPHEN 325 MG/1
650 TABLET ORAL EVERY 6 HOURS PRN
Status: DISCONTINUED | OUTPATIENT
Start: 2023-01-29 | End: 2023-02-21 | Stop reason: HOSPADM

## 2023-01-29 RX ORDER — SODIUM CHLORIDE 9 MG/ML
INJECTION, SOLUTION INTRAVENOUS CONTINUOUS
Status: DISCONTINUED | OUTPATIENT
Start: 2023-01-29 | End: 2023-01-29

## 2023-01-29 RX ORDER — AMOXICILLIN 250 MG
1 CAPSULE ORAL 2 TIMES DAILY PRN
Status: DISCONTINUED | OUTPATIENT
Start: 2023-01-29 | End: 2023-02-21 | Stop reason: HOSPADM

## 2023-01-29 RX ORDER — GABAPENTIN 100 MG/1
100 CAPSULE ORAL 3 TIMES DAILY
Status: DISCONTINUED | OUTPATIENT
Start: 2023-01-29 | End: 2023-02-21 | Stop reason: HOSPADM

## 2023-01-29 RX ORDER — WARFARIN SODIUM 5 MG/1
5 TABLET ORAL
Status: COMPLETED | OUTPATIENT
Start: 2023-01-29 | End: 2023-01-29

## 2023-01-29 RX ORDER — ONDANSETRON 4 MG/1
4 TABLET, ORALLY DISINTEGRATING ORAL EVERY 6 HOURS PRN
Status: DISCONTINUED | OUTPATIENT
Start: 2023-01-29 | End: 2023-02-21 | Stop reason: HOSPADM

## 2023-01-29 RX ORDER — CEFEPIME HYDROCHLORIDE 1 G/1
1 INJECTION, POWDER, FOR SOLUTION INTRAMUSCULAR; INTRAVENOUS EVERY 12 HOURS
Status: DISCONTINUED | OUTPATIENT
Start: 2023-01-29 | End: 2023-01-29

## 2023-01-29 RX ORDER — ARIPIPRAZOLE 5 MG/1
5 TABLET ORAL DAILY
Status: DISCONTINUED | OUTPATIENT
Start: 2023-01-29 | End: 2023-02-21 | Stop reason: HOSPADM

## 2023-01-29 RX ORDER — IPRATROPIUM BROMIDE AND ALBUTEROL SULFATE 2.5; .5 MG/3ML; MG/3ML
3 SOLUTION RESPIRATORY (INHALATION) EVERY 4 HOURS PRN
Status: DISCONTINUED | OUTPATIENT
Start: 2023-01-29 | End: 2023-02-21 | Stop reason: HOSPADM

## 2023-01-29 RX ORDER — CYANOCOBALAMIN 1000 UG/ML
1000 INJECTION, SOLUTION INTRAMUSCULAR; SUBCUTANEOUS DAILY
Status: DISCONTINUED | OUTPATIENT
Start: 2023-01-29 | End: 2023-01-29

## 2023-01-29 RX ORDER — AMOXICILLIN 250 MG
2 CAPSULE ORAL 2 TIMES DAILY PRN
Status: DISCONTINUED | OUTPATIENT
Start: 2023-01-29 | End: 2023-02-21 | Stop reason: HOSPADM

## 2023-01-29 RX ORDER — NYSTATIN 100000 U/G
CREAM TOPICAL 2 TIMES DAILY
Status: DISCONTINUED | OUTPATIENT
Start: 2023-01-29 | End: 2023-01-30

## 2023-01-29 RX ORDER — DOXYCYCLINE 100 MG/10ML
100 INJECTION, POWDER, LYOPHILIZED, FOR SOLUTION INTRAVENOUS EVERY 12 HOURS
Status: DISCONTINUED | OUTPATIENT
Start: 2023-01-29 | End: 2023-01-30

## 2023-01-29 RX ADMIN — VANCOMYCIN HYDROCHLORIDE 2500 MG: 10 INJECTION, POWDER, LYOPHILIZED, FOR SOLUTION INTRAVENOUS at 00:39

## 2023-01-29 RX ADMIN — ACETAMINOPHEN 650 MG: 325 TABLET, FILM COATED ORAL at 23:46

## 2023-01-29 RX ADMIN — VANCOMYCIN HYDROCHLORIDE 500 MG: 500 INJECTION, POWDER, LYOPHILIZED, FOR SOLUTION INTRAVENOUS at 13:59

## 2023-01-29 RX ADMIN — SODIUM CHLORIDE: 9 INJECTION, SOLUTION INTRAVENOUS at 00:46

## 2023-01-29 RX ADMIN — DULOXETINE HYDROCHLORIDE 120 MG: 60 CAPSULE, DELAYED RELEASE ORAL at 15:59

## 2023-01-29 RX ADMIN — GABAPENTIN 100 MG: 100 CAPSULE ORAL at 15:59

## 2023-01-29 RX ADMIN — CEFEPIME HYDROCHLORIDE 1 G: 1 INJECTION, POWDER, FOR SOLUTION INTRAMUSCULAR; INTRAVENOUS at 11:58

## 2023-01-29 RX ADMIN — GABAPENTIN 100 MG: 100 CAPSULE ORAL at 20:46

## 2023-01-29 RX ADMIN — ACETAMINOPHEN 650 MG: 325 TABLET, FILM COATED ORAL at 16:42

## 2023-01-29 RX ADMIN — SODIUM BICARBONATE: 84 INJECTION, SOLUTION INTRAVENOUS at 16:40

## 2023-01-29 RX ADMIN — CEFEPIME HYDROCHLORIDE 2 G: 2 INJECTION, POWDER, FOR SOLUTION INTRAVENOUS at 23:47

## 2023-01-29 RX ADMIN — DOXYCYCLINE 100 MG: 100 INJECTION, POWDER, LYOPHILIZED, FOR SOLUTION INTRAVENOUS at 19:47

## 2023-01-29 RX ADMIN — NYSTATIN: 100000 CREAM TOPICAL at 09:23

## 2023-01-29 RX ADMIN — CYANOCOBALAMIN 1000 MCG: 1000 INJECTION, SOLUTION INTRAMUSCULAR at 15:59

## 2023-01-29 RX ADMIN — NYSTATIN: 100000 CREAM TOPICAL at 20:46

## 2023-01-29 RX ADMIN — SODIUM CHLORIDE: 9 INJECTION, SOLUTION INTRAVENOUS at 14:02

## 2023-01-29 RX ADMIN — PANTOPRAZOLE SODIUM 40 MG: 40 INJECTION, POWDER, FOR SOLUTION INTRAVENOUS at 13:59

## 2023-01-29 RX ADMIN — WARFARIN SODIUM 5 MG: 5 TABLET ORAL at 17:24

## 2023-01-29 RX ADMIN — ARIPIPRAZOLE 5 MG: 5 TABLET ORAL at 15:59

## 2023-01-29 RX ADMIN — PANTOPRAZOLE SODIUM 40 MG: 40 INJECTION, POWDER, FOR SOLUTION INTRAVENOUS at 02:08

## 2023-01-29 ASSESSMENT — ACTIVITIES OF DAILY LIVING (ADL)
ADLS_ACUITY_SCORE: 35
ADLS_ACUITY_SCORE: 37
ADLS_ACUITY_SCORE: 37
ADLS_ACUITY_SCORE: 35
ADLS_ACUITY_SCORE: 37
ADLS_ACUITY_SCORE: 35
ADLS_ACUITY_SCORE: 35

## 2023-01-29 NOTE — ED PROVIDER NOTES
"    History     Chief Complaint:  Hypotension       HPI   Shira Rodriguez is a 62 year old female who presents with low blood pressure and found on ground.    Patient is a 62-year-old female is brought to the emergency by ambulance for assessment of possible syncope versus low blood pressure.  Patient was last seen well at 5 PM last night family found her on the floor of the apartment building.  Patient was lethargic and was noted to be hypotensive by EMS and brought to the emergency room as a red arrival patient is a poor historian states she does not move or walk very much.  She denies vomiting chest pain shortness of breath or abdominal pain.  Patient's initial blood pressure was in the 50s patient was again given a dose of phenylephrine and IV fluids transported to the emergency by EMS for assessment.      Independent Historian: History also obtained using EMS    Review of External Notes:      ROS:  Review of Systems  No chest pain or shortness of breath no fall or head injury.  All the systems negative except as above  Allergies:  Adhesive Tape  Erythromycin     Medications:    acetaminophen (TYLENOL) 325 MG tablet  albuterol (PROAIR HFA/PROVENTIL HFA/VENTOLIN HFA) 108 (90 Base) MCG/ACT inhaler  ARIPiprazole (ABILIFY) 10 MG tablet  DULoxetine (CYMBALTA) 60 MG capsule  lisinopril (ZESTRIL) 5 MG tablet  methotrexate 2.5 MG tablet  traMADol (ULTRAM) 50 MG tablet  VITAMIN D3 25 MCG (1000 UT) tablet  warfarin ANTICOAGULANT (COUMADIN) 5 MG tablet  gabapentin (NEURONTIN) 100 MG capsule  insulin syringe-needle U-100 (BD INSULIN SYRINGE ULTRAFINE) 30G X 1/2\" 1 ML miscellaneous  methotrexate 50 MG/2ML injection  omeprazole (PRILOSEC) 20 MG DR capsule        Past Medical History:    Past Medical History:   Diagnosis Date     Chronic bronchitis (H) 07/30/2015     Contact dermatitis      Depressive disorder 1985     Eczema      Elevated liver enzymes      H/O total knee replacement, left      History of total hip " replacement 10/27/2011     Hyperlipidemia      Hypertension      Morbid obesity (H)      Osteoarthrosis      RA (rheumatoid arthritis) (H) 8/2018     Sleep apnea      Tobacco use disorder      Varicella 1965       Past Surgical History:    Past Surgical History:   Procedure Laterality Date     ARTHROPLASTY KNEE  6/14/2012    Procedure: ARTHROPLASTY KNEE;  Left Total Knee Arthroplasty;  Surgeon: Annette Quesada MD;  Location: [facility]     ARTHROSCOPY HIP Right      BRONCHOSCOPY FLEXIBLE AND RIGID N/A 9/17/2019    Procedure: Bronchoscopy flexible;  Surgeon: Patrick Rayo MD;  Location: [facility]     COLONOSCOPY N/A 7/16/2021    Procedure: COLONOSCOPY, WITH POLYPECTOMY AND BIOPSY;  Surgeon: Diogo Lynch MD;  [facility]     CV EXTRACORPERAL MEMBRANE OXYGENATION N/A 9/9/2019    Procedure: Extracorporeal Membrance Oxygenation;  Surgeon: Kaleb Mcfarlane MD;  Location:  [facility]     INSERT EXTRACORPORAL MEMBRANE OXYGENATOR N/A 9/17/2019    Procedure: Venous-Venous cannulation using ultrasound guidance and transesophageal echocardiogram, venous-arterial ecmo decannulation and repair of left femoral artery;  Surgeon: Patrick Rayo MD;  Location: [facility]     IR MECH THROMB PRIM ART, NON-INTRACRNL  9/10/2019     IR PULMONARY ANGIOGRAM BILATERAL  9/10/2019     IR THROMBOLYSIS ARTERIAL INFUSION INITIAL DAY  9/10/2019     THORACENTESIS N/A 1/16/2020    Procedure: THORACENTESIS;  Surgeon: Georgina Richardson MD;  Location: [facility]     ZZ TOTAL HIP ARTHROPLASTY  07/09/04    RT        Family History:    family history includes Alcoholism in her father and sister; Breast Cancer in her paternal grandmother; Cardiovascular in her paternal aunt and paternal uncle; Cerebrovascular Disease in her mother; Depression in her cousin, maternal grandmother, mother, sister, sister, and another family member; Diabetes in her mother; Heart Disease in her father and sister; Hypertension in her  maternal grandmother and mother; Pancreatic Cancer in her paternal grandmother; Prostate Cancer in her paternal grandfather; Skin Cancer in her mother; Substance Abuse in her father, sister, and sister; Thyroid Disease in her mother and sister.    Social History:   reports that she quit smoking about 3 years ago. Her smoking use included cigarettes. She started smoking about 41 years ago. She has never used smokeless tobacco. She reports current alcohol use. She reports that she does not use drugs.  PCP: Anjel Busby     Physical Exam     Patient Vitals for the past 24 hrs:   BP Temp Temp src Pulse Resp SpO2   01/28/23 2300 103/58 -- -- 105 16 100 %   01/28/23 2250 104/45 -- -- 105 19 100 %   01/28/23 2245 101/66 -- -- 108 21 100 %   01/28/23 2235 121/69 -- -- 112 23 100 %   01/28/23 2230 107/46 -- -- 111 17 100 %   01/28/23 2219 (!) 88/71 100.2  F (37.9  C) Rectal 118 27 100 %   01/28/23 2217 (!) 72/57 -- -- 118 24 99 %        Physical Exam  Constitutional:       Appearance: She is obese.      Comments: Pale appearing generally deconditioned.  Poor general hygiene   HENT:      Head: Normocephalic.      Mouth/Throat:      Mouth: Mucous membranes are moist.   Eyes:      Pupils: Pupils are equal, round, and reactive to light.   Cardiovascular:      Rate and Rhythm: Regular rhythm. Tachycardia present.      Comments: Chest wall: There are linear abrasions as if she had been leaning against something for a while across the breast tissue.  There is candidal dermatitis under both breast.  Pulmonary:      Effort: Pulmonary effort is normal.      Breath sounds: Normal breath sounds.   Abdominal:      Palpations: Abdomen is soft.      Comments: There is candidal rash over the pannus of her lower abdomen   Musculoskeletal:         General: Normal range of motion.      Cervical back: Normal range of motion. No tenderness.   Skin:     General: Skin is warm.      Capillary Refill: Capillary refill takes 2 to 3 seconds.    Neurological:      Mental Status: She is alert and oriented to person, place, and time.   Psychiatric:         Mood and Affect: Mood normal.           Emergency Department Course   ECG:  ECG results from 12/06/22   EKG Performed in Clinic w/ Provider Reading Fee     Value    Systolic Blood Pressure     Diastolic Blood Pressure     Ventricular Rate 107    Atrial Rate 107    GA Interval 158    QRS Duration 84        QTc 445    P Axis 43    R AXIS 50    T Axis 50    Interpretation ECG      Sinus tachycardia  Anterior infarct (cited on or before 24-MAY-2022)  Abnormal ECG  When compared with ECG of 24-MAY-2022 09:20,  ST now depressed in Inferior leads  T wave inversion no longer evident in Anterolateral leads  Unconfirmed report - this is a computer generated report only - please see the [facility] center for final interpretation  Confirmed by - [facility], PHYSICIAN ([id number]),  AMANDA PRABHAKAR ([id number]) on 12/6/2022 7:40:46 PM       *Note: Due to a large number of results and/or encounters for the requested time period, some results have not been displayed. A complete set of results can be found in Results Review.       Imaging:  CT Head w/o Contrast   Final Result   IMPRESSION:   1.  No CT evidence for acute intracranial process.   2.  Brain atrophy and presumed chronic microvascular ischemic changes as above.      CT Chest Abdomen Pelvis w/o Contrast   Final Result   IMPRESSION:   1.  Areas of mostly linear scarring and/or atelectasis in the lungs greatest in the left lung base. Nothing definite for acute pneumonitis.      2.  Stable chronic appearing small left pleural effusion.      3.  Small amount of fluid seen adjacent to the distal thoracic esophagus of uncertain significance. Scattered nonspecific posterior mediastinal lymph nodes along the course of the esophagus. These findings are of uncertain significance or etiology.    Correlation for any signs of esophagitis or other inflammatory process.       4.  Cholelithiasis. No biliary dilatation.      5.  Question small amount of edema seen around the second and third portions of the duodenum. Clinical correlation to exclude any signs of a duodenal inflammatory process. Correlation with pancreatic enzymes also suggested to exclude pancreatitis.      6.  3 cm right adrenal nodule is technically indeterminate on this study but unchanged from prior CT scan and reportedly represents a benign adrenal adenoma based off a prior MRI.      7.  Scattered colonic diverticuli without evidence for diverticulitis. Normal appendix.      XR Chest Port 1 View   Final Result   IMPRESSION:       No focal airspace disease. No pleural effusion or pneumothorax.      The cardiomediastinal silhouette is unremarkable. Aortic calcifications.      Echocardiogram Complete    (Results Pending)      Report per radiology    Laboratory:  Labs Ordered and Resulted from Time of ED Arrival to Time of ED Departure   ISTAT GASES LACTATE VENOUS POCT - Abnormal       Result Value    Lactic Acid POCT 3.2 (*)     Bicarbonate Venous POCT 13 (*)     O2 Sat, Venous POCT 42 (*)     pCO2V Venous POCT 29 (*)     pH Venous POCT 7.25 (*)     pO2 Venous POCT 27     INR - Abnormal    INR 1.99 (*)    ROUTINE [facility] WITH MICROSCOPIC REFLEX TO CULTURE   COMPREHENSIVE METABOLIC PANEL   ETHYL ALCOHOL LEVEL   MAGNESIUM   TROPONIN T, HIGH SENSITIVITY   CBC WITH PLATELETS AND DIFFERENTIAL   TYPE AND SCREEN, ADULT   URINE CULTURE   BLOOD CULTURE   BLOOD CULTURE   ABO/RH TYPE AND SCREEN        Procedures       Emergency Department Course & Assessments:             Interventions:  Medications   0.9% sodium chloride BOLUS (2,000 mLs Intravenous New Bag 1/28/23 2225)        Independent Interpretation (X-rays, CTs, rhythm strip):      Consultations/Discussion of Management or Tests:          Social Determinants of Health affecting care:       Disposition:  The patient was admitted to the hospital under the care of Dr. Mera.      Impression & Plan    CMS Diagnoses:   The patient has signs of Severe Sepsis        If one the following conditions is present, a 30 mL/kg bolus is recommended as part of the 6 hour bundle (IBW can be used for BMI >30, or document refusal/contraindication):      1.   Initial hypotension  defined as 2 bps < 90 or map < 65 in the 6hrs before or 3hrs after time zero.     2.  Lactate >4.      The patient has signs of Severe Sepsis as evidenced by:    1. 2 SIRS criteria, AND  2. Suspected infection, AND   3. Organ dysfunction:  SBP <90, MAP < 65, or SBP decrease of >40 from baseline due to infection and Lactic Acidosis with value >2.0    Time severe sepsis diagnosis confirmed:   01/30/23 as this was the time when SBP <90 or MAP <65 and Lactate resulted, and the level was > 2.0    3 Hour Severe Sepsis Bundle Completion:    1. Initial Lactic Acid Result:   Recent Labs   Lab Test 01/29/23  0002 01/28/23  2222 09/25/19  1641   LACT 2.0 3.2* 1.4     2. Blood Cultures before Antibiotics: Yes  3. Broad Spectrum Antibiotics Administered:  yes       Anti-infectives (From admission through now)    Start     Dose/Rate Route Frequency Ordered Stop    01/30/23 0000  ceFEPIme (MAXIPIME) 2 g vial to attach to  ml bag for ADULTS or 50 ml bag for PEDS         2 g  over 30 Minutes Intravenous EVERY 12 HOURS 01/29/23 1350      01/29/23 1700  doxycycline (VIBRAMYCIN) 100 mg vial to attach to  mL bag         100 mg  over 1-2 Hours Intravenous EVERY 12 HOURS 01/29/23 1645      01/28/23 2350  vancomycin (VANCOCIN) 2,500 mg in 0.9% NaCl 500 mL intermittent infusion         2,500 mg  over 120 Minutes Intravenous ONCE 01/28/23 2346 01/29/23 0308    01/28/23 2340  ceFEPIme (MAXIPIME) 2 g vial to attach to  ml bag for ADULTS or 50 ml bag for PEDS         2 g  over 30 Minutes Intravenous ONCE 01/28/23 2339 01/29/23 0023          4. Is initial hypotension present?     Yes. (Definition - 2 SBPs <90, MAP <65, or decrease > 40 from  baseline due to infection w/in 3 hrs of each other during the time period of 6 hrs before and 3 hrs after time zero)   Full 30 mL/kg bolus given (see amount below).    BMI Readings from Last 1 Encounters:   01/29/23 45.90 kg/m      30 mL/kg fluids based on weight: 3,870 mL  30 mL/kg fluids based on IBW (must be >= 60 inches tall): 1,780 mL      Severe Sepsis reassessment:  1. Repeat Lactic Acid Level within 6 hours of time zero:   2. MAP>65 after initial IVF bolus, will continue to monitor fluid status and vital signs    I attest to having performed a repeat sepsis exam and assessment of perfusion at  and the results demonstrate improved perfusion. and None        Medical Decision Making:  Patient presents from home with a low blood pressure.  On arrival is mentating well but given IV phenylephrine on route by EMS.  Patient was given 2 L bolus for sepsis and concerns.  Rectal temperature is 100.2.  Patient looks disheveled and somewhat poorly cared for.  Focus of infection is unclear on examination no clinical concerns for meningitis.  Chest x-ray shows no pneumonia CT of the abdomen was ordered for assessment of sources for infection due to negative urinalysis and negative chest x-ray hospitalist added on CT of the head and CT of the chest.  Regardless patient requires admission his clinical concerns are for neutropenic fever and neutropenia and sepsis.  Will admit to [facility] due to lack of need for pressors or intubation at this time.  Care was discussed with the hospitalist was admitted as an inpatient.    Critical Care time:  was 0 minutes for this patient excluding procedures.    Diagnosis:    ICD-10-CM    1. Other pancytopenia (H)  D61.818 Vitamin B12     Folate     Ferritin     Iron and iron binding capacity     Flow Cytometry Blood     Vitamin B12     Folate     Ferritin     Iron and iron binding capacity     Flow Cytometry Blood     Protein electrophoresis     Kappa and lambda light chain     Protein  Immunofixation Serum     Immunoglobulins A G and M     Protein electrophoresis     Kappa and lambda light chain     Protein Immunofixation Serum     Immunoglobulins A G and M     Reticulocyte count     Reticulocyte count     Partial thromboplastin time     Fibrinogen activity     D dimer quantitative     Partial thromboplastin time     Fibrinogen activity     D dimer quantitative      2. Bacterial sepsis (H)  A41.9 Protein electrophoresis     Kappa and lambda light chain     Protein Immunofixation Serum     Immunoglobulins A G and M     Protein electrophoresis     Kappa and lambda light chain     Protein Immunofixation Serum     Immunoglobulins A G and M     Reticulocyte count     Reticulocyte count     Partial thromboplastin time     Fibrinogen activity     D dimer quantitative     Partial thromboplastin time     Fibrinogen activity     D dimer quantitative      3. Neutropenic fever (H)  D70.9 Vitamin B12    R50.81 Folate     Ferritin     Iron and iron binding capacity     Flow Cytometry Blood     Vitamin B12     Folate     Ferritin     Iron and iron binding capacity     Flow Cytometry Blood     Protein electrophoresis     Kappa and lambda light chain     Protein Immunofixation Serum     Immunoglobulins A G and M     Protein electrophoresis     Kappa and lambda light chain     Protein Immunofixation Serum     Immunoglobulins A G and M      4. Pernicious anemia  D51.0 DISCONTINUED: cyanocobalamin injection 1,000 mcg           Discharge Medications:  New Prescriptions    No medications on file        Scribe Disclosure:  ITorsten MD, am serving as a scribe at 11:01 PM on 1/28/2023 to document services personally performed by Torsten Khan MD based on my observations and the provider's statements to me.     1/28/2023   Torsten Khan MD Goodman, Brian Samuel, MD  01/30/23 0049

## 2023-01-29 NOTE — PROGRESS NOTES
[facility]    Medicine Progress Note - Hospitalist Service    Date of Admission:  1/28/2023    Assessment & Plan   Shira Rodriguez is a 62-year-old female with rheumatoid arthritis on methotrexate, prior massive PE on warfarin, morbid obesity who presented on 1/28/2023 with severe sepsis.  She was found unresponsive in her home.  Was hypotensive, had temp of 100.2 in route to hospital.     Severe sepsis-present on admission  Neutropenic fever  - With lactic acidosis of 3.2, hypotension, HR of 105, WBC of 0.4, acute metabolic encephalopathy - met criteria for severe sepsis on admission  - CT C/A/P showed small chronic left pleural effusion, small fluid around distal esophagus and duodenum  - Noted to have panniculitis under abdominal pannus, significant redness under her breasts.  Source could be panniculitis, mucositis due to severe neutropenia, duodenitis.  - UA does not suggest UTI, no pneumonia, COVID-19, influenza and RSV negative.  Has a cough which is chronic. Has had severe throat pain for 2 days.  Has diverticulosis but no diverticulitis  - Blood cultures negative so far  - Blood pressure improved with IV fluids and is currently in normal range.  - Lactic acidosis has resolved  - On IV cefepime and vancomycin, continue  - Continue IV fluids  -Echo pending  -ID consult pending    Acute metabolic encephalopathy-secondary to severe sepsis  Head CT-did not show any acute changes  Mental status improved.  Now back to baseline    Pancytopenia with severe leukopenia and neutropenia, moderate anemia and thrombocytopenia  Severe vitamin B12 deficiency  - On 12/7/2022, CBC showed hemoglobin 13.4, WBC 10.6, platelets 271  - On admission, 1/28 - CBC showed Hb 9.4, WBC 0.4, ANC 0.1, platelets 59  - Iron studies showed normal iron saturation, low iron binding capacity indicating anemia of chronic disease.  Reticulocyte count suppressed  - B12 low at < 150  - Acute pancytopenia could be due to  severe sepsis, methotrexate, B12 deficiency.  Less likely bone marrow pathology  - Consult hematology/oncology.  Case discussed with Dr. Hills  - Peripheral blood smear pending  - Daily CBC with differential  - start on cyanocobalamin   - Hold methotrexate  -Following lab tests are [id number], fibrinogen, PTT, immunoglobulins A, G, M, serum protein immunofixation, kappa and lambda light chain, serum protein electrophoresis, LDH, flow cytometry, serum folate    Acute kidney injury on top of CKD 4  Mild anion gap metabolic acidosis - secondary to lactic acidosis and acute kidney injury, pH 7.25 on admission  - Baseline creatinine 2-2.2.  Creatinine on admission 3.25.  Now improved to 2.88 with IV fluids  - CO2 remains low at 12.  - Nephrology consulted  - Continue IV fluids.  Decrease NS to 100 mL/h.  Monitor labs daily  - Hold lisinopril and nephrotoxic medications    Probable esophagitis  Probable duodenitis  -CT on admission showed small amount of fluid adjacent to distal thoracic esophagus and scattered posterior mediastinal lymph nodes along esophagus of uncertain etiology but could be secondary to esophagitis or other inflammatory process.  CT scan also showed small edema around second and third portion of duodenum.  -Lipase is normal  -On IV Protonix, continue    Hypomagnesemia, magnesium 1.4  -Replaced  -Monitor and replace as needed    Mild hypovolemic hyponatremia  -Sodium 132 on admission.  Now improved to 135 with IV fluids  -Monitor    Type II MI due to severe sepsis  -High-sensitivity troponin mildly elevated at 48 and stable on recheck    Hyperuricemia, uric acid 8.9    History of massive PE causing PEA arrest, 9/2019  -On warfarin, continue.  Dose per pharmacy    Rheumatoid arthritis  -Had been on methotrexate for some time, resumed about 2 weeks ago due to worsening back and shoulder pain  -Hold methotrexate    3 cm right adrenal nodule  -Noted on CT.  Was present on previous imaging as well.   "Likely benign  -A.m. cortisol 1/29, normal at 43.9    COPD  -No acute exacerbation    Chronic major depression  Chronic pain  - see recent pain clinic visit summary from 1/17/2023 for background information  - Continue Abilify, Cymbalta and Neurontin    Morbid obesity, BMI 46    Hypoalbuminemia, albumin 2.5    Panniculitis  Candidal rash-under pannus, breasts  -consult [facility]    Essential hypertension  PTA-lisinopril  -Hold lisinopril due to acute kidney injury and hypotension on admission       Diet: Combination Diet Low Saturated Fat Na <2400mg Diet, No Caffeine Diet    DVT Prophylaxis: Warfarin   [place] Catheter: Not present  Lines: None     Cardiac Monitoring: ACTIVE order. Indication: Severe sepsis  Code Status: Full Code      Clinically Significant Risk Factors Present on Admission            # Hypomagnesemia: Lowest Mg = 1.4 mg/dL in last 2 days, will replace as needed   # Hypoalbuminemia: Lowest albumin = 2.5 g/dL at 1/28/2023 10:21 PM, will monitor as appropriate  # Drug Induced Coagulation Defect: home medication list includes an anticoagulant medication  # Thrombocytopenia: Lowest platelets = 59 in last 2 days, will monitor for bleeding   # Hypertension: home medication list includes antihypertensive(s)      # Severe Obesity: Estimated body mass index is 46 kg/m  as calculated from the following:    Height as of 1/17/23: 1.676 m (5' 6\").    Weight as of 1/17/23: 129.3 kg (285 lb).           Disposition Plan      Expected Discharge Date: 01/31/2023                  Cece Gary MD  Hospitalist Service  [facility]  Securely message with [employer] (more info)  Text page via [employer] Paging/Directory   ______________________________________________________________________    Interval History   Patient reports she feels better than yesterday.  Complains of severe throat pain.  Has mild cough which she reports is chronic.  Complains of significant pain under her pannus  Blood pressures have " improved.  Mental status has improved    Physical Exam   Vital Signs: Temp: 98.1  F (36.7  C) Temp src: Oral BP: 128/62 Pulse: 86   Resp: 21 SpO2: 100 % O2 Device: None (Room air)    Weight: 0 lbs 0 oz    Constitutional - awake and alert, resting in bed, in no acute distress  Cardiovascular - regular rate and rhythm, no murmurs, no edema  Pulmonary - lungs are clear to auscultation bilaterally, no wheezing or rhonchi  GI - abdomen is soft, nontender, nondistended, no hepatosplenomegaly or masses  Integumentary -skin rash and superficial ulcers under pannus, groin, under breasts  Neurological - awake, alert and oriented x3.  Moving all 4 extremities, normal speech, no focal deficits    Medical Decision Making       55 MINUTES SPENT BY ME on the date of service doing chart review, history, exam, documentation & further activities per the note.      Data     I have personally reviewed the following data over the past 24 hrs:    0.3 (LL)  \   9.2 (L)   / 59 (L)     135 (L) 108 (H) 54.6 (H) /  73   4.6 12 (L) 2.88 (H) \       ALT: 13 AST: 28 AP: 57 TBILI: 0.7   ALB: 2.5 (L) TOT PROTEIN: 6.1 (L) LIPASE: 10 (L)       Trop: 46 (H) BNP: N/A       Procal: N/A CRP: N/A Lactic Acid: 2.0       INR:  1.99 (H) PTT:  N/A   D-dimer:  N/A Fibrinogen:  N/A       Ferritin:  1,499 (H) % Retic:  0.2 (L) LDH:  N/A       Imaging results reviewed over the past 24 hrs:   Recent Results (from the past 24 hour(s))   XR Chest Port 1 View    Narrative    EXAM: XR CHEST PORT 1 VIEW  LOCATION: [facility]  DATE/TIME: 1/28/2023 11:57 PM    INDICATION: Sepsis.  COMPARISON: 12/06/2022      Impression    IMPRESSION:     No focal airspace disease. No pleural effusion or pneumothorax.    The cardiomediastinal silhouette is unremarkable. Aortic calcifications.   CT Chest Abdomen Pelvis w/o Contrast    Narrative    EXAM: CT CHEST ABDOMEN PELVIS W/O CONTRAST  LOCATION: [facility]  DATE/TIME: 1/29/2023  12:32 AM    INDICATION: Sepsis.  COMPARISON: Chest CT from 7/21/2022 and 5/24/2022.  TECHNIQUE: CT scan of the chest, abdomen, and pelvis was performed without IV contrast. Multiplanar reformats were obtained. Dose reduction techniques were used.   CONTRAST: None.    FINDINGS:   LUNGS AND PLEURA: Patchy mostly linear opacities left lower lobe inferiorly favored to be due to some chronic scarring and/or atelectasis and similar to previous. Additional scattered areas of mild subpleural scarring or atelectasis elsewhere in the   periphery of the lungs. No definite acute appearing infiltrates or consolidation. Central airways are clear. Small chronic appearing left pleural effusion is unchanged.    MEDIASTINUM/AXILLAE: Small amount of fluid adjacent to the distal esophagus of uncertain significance. Small hiatal hernia. Scattered small posterior mediastinal lymph nodes probably reactive in nature. Normal heart size. No pericardial effusion. Normal   caliber thoracic aorta. Axillary regions are unremarkable.    CORONARY ARTERY CALCIFICATION: Mild.    HEPATOBILIARY: Liver is negative. Cholelithiasis. No biliary dilatation.    PANCREAS: Normal.    SPLEEN: Normal.    ADRENAL GLANDS: 3 cm right adrenal nodule is technically indeterminate on this study but unchanged compared to 05/24/2022 and compatible with an adrenal adenoma based off an older MRI scan by report. Left adrenal gland is negative.    KIDNEYS/BLADDER: Normal.    BOWEL: Bowel is normal in caliber with no evidence for obstruction. Question some mild edema seen around the second and third portions of the duodenum, nonspecific. Normal appendix. Scattered colonic diverticuli without evidence for diverticulitis. No   acute bowel findings.    LYMPH NODES: Normal.    VASCULATURE: Unremarkable.    PELVIC ORGANS: Normal.    MUSCULOSKELETAL: Mild to moderate degenerative changes throughout the spine. Right TAWNY. Moderate size fat-containing umbilical hernia.       Impression    IMPRESSION:  1.  Areas of mostly linear scarring and/or atelectasis in the lungs greatest in the left lung base. Nothing definite for acute pneumonitis.    2.  Stable chronic appearing small left pleural effusion.    3.  Small amount of fluid seen adjacent to the distal thoracic esophagus of uncertain significance. Scattered nonspecific posterior mediastinal lymph nodes along the course of the esophagus. These findings are of uncertain significance or etiology.   Correlation for any signs of esophagitis or other inflammatory process.    4.  Cholelithiasis. No biliary dilatation.    5.  Question small amount of edema seen around the second and third portions of the duodenum. Clinical correlation to exclude any signs of a duodenal inflammatory process. Correlation with pancreatic enzymes also suggested to exclude pancreatitis.    6.  3 cm right adrenal nodule is technically indeterminate on this study but unchanged from prior CT scan and reportedly represents a benign adrenal adenoma based off a prior MRI.    7.  Scattered colonic diverticuli without evidence for diverticulitis. Normal appendix.   CT Head w/o Contrast    Narrative    EXAM: CT HEAD W/O CONTRAST  LOCATION: [facility]  DATE/TIME: 1/29/2023 12:33 AM    INDICATION: Delirium and unresponsiveness  COMPARISON:  CT head 09/10/2019.  TECHNIQUE: Routine CT Head without IV contrast. Multiplanar reformats. Dose reduction techniques were used.    FINDINGS:  INTRACRANIAL CONTENTS: No intracranial hemorrhage, extraaxial collection, or mass effect.  No CT evidence of acute infarct. Mild presumed chronic small vessel ischemic changes. Mild generalized volume loss. No hydrocephalus.     VISUALIZED ORBITS/SINUSES/MASTOIDS: No intraorbital abnormality. No significant paranasal sinus mucosal disease. No middle ear or mastoid effusion.    BONES/SOFT TISSUES: No acute abnormality.      Impression    IMPRESSION:  1.  No CT evidence for  acute intracranial process.  2.  Brain atrophy and presumed chronic microvascular ischemic changes as above.

## 2023-01-29 NOTE — PROGRESS NOTES
RECEIVING UNIT ED HANDOFF REVIEW    ED Nurse Handoff Report was reviewed by: Rema Manjarrez RN on January 29, 2023 at 5:13 PM

## 2023-01-29 NOTE — PHARMACY-ANTICOAGULATION SERVICE
Clinical Pharmacy - Warfarin Dosing Consult     Pharmacy has been consulted to manage this patient s warfarin therapy.  Indication: DVT/PE Prophylaxis  Therapy Goal: INR 2-3  Warfarin Prior to Admission: Yes  Warfarin PTA Regimen: Warfarin 5 mg qMon and 2.5 mg x 6 days per week  Significant drug interactions: Cefepime, vancomycin  Recent documented change in oral intake/nutrition: Unknown  Dose Comments: INR is slightly subtherapeutic 1.99, will give 5 mg x 1 tonight    INR   Date Value Ref Range Status   01/28/2023 1.99 (H) 0.85 - 1.15 Final   01/02/2023 2.2 (H) 0.9 - 1.1 Final       Recommend warfarin 5 mg today.  Pharmacy will monitor Shira Rodriguez daily and order warfarin doses to achieve specified goal.      Please contact pharmacy as soon as possible if the warfarin needs to be held for a procedure or if the warfarin goals change.

## 2023-01-29 NOTE — ED NOTES
Bed: ST02  Expected date:   Expected time:   Means of arrival:   Comments:  [id number] Sepsis alert, hypotension, pressors given

## 2023-01-29 NOTE — ED TRIAGE NOTES
Pt was last seen at 1700 tonight. Pt was found on the floor of her apartment sitting on the floor with family holding her up or she would go apneic. Pt is incontinent of urine upon EMS arrival. Phenylephrine 100mcg was given by EMS. Pressures soft

## 2023-01-29 NOTE — ED NOTES
[facility]  ED Nurse Handoff Report    ED Chief complaint: Hypotension      ED Diagnosis:   Final diagnoses:   Bacterial sepsis (H)   Neutropenic fever (H)       Code Status: to be addressed by admitting provider    Allergies:   Allergies   Allergen Reactions     Adhesive Tape Blisters     Erythromycin Rash       Patient Story: pt has a hx of Pes, COPD, and hypertension, who presented to the ED after being found  On the floor of her apartment sitting on the floor slumped over. Upon EMS arrival, pt was hypotensive, and 100 mcg of phenylephrine was given by EMS.        Focused Assessment:    Respiratory: on RA, stating in the high 90s  Cardiac: sinus tach, denies chest pain  Neuro: A+ox4  Skin: red excoriated skin in perineal folds, all four extremities, behind the knees.       Treatments and/or interventions provided:   Labs Ordered and Resulted from Time of ED Arrival to Time of ED Departure   ROUTINE UA WITH MICROSCOPIC REFLEX TO CULTURE - Abnormal       Result Value    Color Urine Orange (*)     Appearance Urine Slightly Cloudy (*)     Glucose Urine Negative      Bilirubin Urine Negative      Ketones Urine Negative      Specific Gravity Urine 1.012      Blood Urine Moderate (*)     pH Urine 5.0      Protein Albumin Urine 30 (*)     Urobilinogen Urine Normal      Nitrite Urine Negative      Leukocyte Esterase Urine Negative      Mucus Urine Present (*)     Amorphous Crystals Urine Few (*)     RBC Urine 5 (*)     WBC Urine 0      Squamous Epithelials Urine 3 (*)     Hyaline Casts Urine 3 (*)    ISTAT GASES LACTATE VENOUS POCT - Abnormal    Lactic Acid POCT 3.2 (*)     Bicarbonate Venous POCT 13 (*)     O2 Sat, Venous POCT 42 (*)     pCO2V Venous POCT 29 (*)     pH Venous POCT 7.25 (*)     pO2 Venous POCT 27     COMPREHENSIVE METABOLIC PANEL - Abnormal    Sodium 132 (*)     Potassium 5.2      Chloride 103      Carbon Dioxide (CO2) 13 (*)     Anion Gap 16 (*)     Urea Nitrogen 55.5 (*)     Creatinine  3.25 (*)     Calcium 8.9      Glucose 84      Alkaline Phosphatase 57      AST 28      ALT 13      Protein Total 6.1 (*)     Albumin 2.5 (*)     Bilirubin Total 0.7      GFR Estimate 16 (*)    INR - Abnormal    INR 1.99 (*)    MAGNESIUM - Abnormal    Magnesium 1.4 (*)    TROPONIN T, HIGH SENSITIVITY - Abnormal    Troponin T, High Sensitivity 48 (*)    CBC WITH PLATELETS AND DIFFERENTIAL - Abnormal    WBC Count 0.4 (*)     RBC Count 3.30 (*)     Hemoglobin 9.4 (*)     Hematocrit 29.7 (*)     MCV 90      MCH 28.5      MCHC 31.6      RDW 13.6      Platelet Count 59 (*)    CK TOTAL - Abnormal     (*)    DIFFERENTIAL - Abnormal    % Neutrophils 24      % Lymphocytes 72      % Monocytes 3      % Eosinophils 1      % Basophils 0      Absolute Neutrophils 0.1 (*)     Absolute Lymphocytes 0.3 (*)     Absolute Monocytes 0.0      Absolute Eosinophils 0.0      Absolute Basophils 0.0      RBC Morphology Confirmed RBC Indices      Platelet Assessment Platelets Clumped (*)     Reactive Lymphocytes Present (*)    ETHYL ALCOHOL LEVEL - Normal    Alcohol ethyl <0.01     INFLUENZA A/B & SARS-COV2 PCR MULTIPLEX - Normal    Influenza A PCR Negative      Influenza B PCR Negative      RSV PCR Negative      SARS CoV2 PCR Negative     LACTIC ACID WHOLE BLOOD - Normal    Lactic Acid 2.0     LIPASE   TYPE AND SCREEN, ADULT    ABO/RH(D) A POS      Antibody Screen Negative      SPECIMEN EXPIRATION DATE [id number]     URINE CULTURE   BLOOD CULTURE   BLOOD CULTURE   ABO/RH TYPE AND SCREEN       CT Head w/o Contrast   Final Result   IMPRESSION:   1.  No CT evidence for acute intracranial process.   2.  Brain atrophy and presumed chronic microvascular ischemic changes as above.      CT Chest Abdomen Pelvis w/o Contrast   Final Result   IMPRESSION:   1.  Areas of mostly linear scarring and/or atelectasis in the lungs greatest in the left lung base. Nothing definite for acute pneumonitis.      2.  Stable chronic appearing small left pleural  effusion.      3.  Small amount of fluid seen adjacent to the distal thoracic esophagus of uncertain significance. Scattered nonspecific posterior mediastinal lymph nodes along the course of the esophagus. These findings are of uncertain significance or etiology.    Correlation for any signs of esophagitis or other inflammatory process.      4.  Cholelithiasis. No biliary dilatation.      5.  Question small amount of edema seen around the second and third portions of the duodenum. Clinical correlation to exclude any signs of a duodenal inflammatory process. Correlation with pancreatic enzymes also suggested to exclude pancreatitis.      6.  3 cm right adrenal nodule is technically indeterminate on this study but unchanged from prior CT scan and reportedly represents a benign adrenal adenoma based off a prior MRI.      7.  Scattered colonic diverticuli without evidence for diverticulitis. Normal appendix.      XR Chest Port 1 View   Final Result   IMPRESSION:       No focal airspace disease. No pleural effusion or pneumothorax.      The cardiomediastinal silhouette is unremarkable. Aortic calcifications.        Patient's response to treatments and/or interventions: tolerated appropriately     To be done/followed up on inpatient unit:  frequent vitals, repeat labs and imaging     Does this patient have any cognitive concerns?: none    Activity level - Baseline/Home:  Unknown  Activity Level - Current:   Total Care    Patient's Preferred language: English   Needed?: No    Isolation: None  Infection: Not Applicable  Patient tested for COVID 19 prior to admission: YES  Bariatric?: Yes    Vital Signs:   Vitals:    01/28/23 2345 01/29/23 0000 01/29/23 0026 01/29/23 0030   BP: 121/58 118/81 115/83 121/80   Pulse: 105 100 98 99   Resp: 20 28 21 23   Temp:       TempSrc:       SpO2: 100% 100% 100% 100%       Cardiac Rhythm:Cardiac Rhythm: Sinus tachycardia    Was the PSS-3 completed:   Yes  What interventions are  required if any?               Family Comments:   OBS brochure/video discussed/provided to patient/family: N/A              Name of person given brochure if not patient:               Relationship to patient:     For the majority of the shift this patient's behavior was Green.   Behavioral interventions performed were .    ED NURSE PHONE NUMBER:

## 2023-01-29 NOTE — CONSULTS
Note INFECTIOUS DISEASES CONSULTATION NOTE  Covering for Tae Perez & Audra     Date 2023     Name /  Shira Rodriguez   1960     MRN [id number]     Thank you for asking us to see Shira BRITTANI Michael for infectious diseases evaluation  REQUESTING PROVIDER Dr. Rinaldi  REASON FOR CONSULT Neutropenic sepsis      CHIEF COMPLAINT    Weakness, worsening low back pain, fever    HPI    63 y/o F with PMHx of Rheumatoid arthritis on methotrexate for 5 years (previously on subcutaneous and then transitioned to po 3 weeks PTA), not on biologics or chronic steroids, Hx of massive PE causing PEA arrest requiring ECMO (admitted September to 2019), severe morbid obesity, chronic back pain, who presented with progressive fatigue that has been ongoing for a few months and worsening low back pain, bilateral shoulder pain recently. She also noted very painful skin lesions and wounds on both inframammary area R>L, pannus and bilateral popliteal area R>L. She also has a chronic cough for years and follows with a Pulmonologist. She also has a chronic left pleural effusion that has been tapped by twice and no etiology was identified but infection has been ruled out.     She presented to the ED on 23 due to being found on the floor and was not well responsive. EMS was called and she was hypotensive en route. She had Tm 100.2 F and sinus tachycardia on EKG. She was not hypoxic. On exam, there was severe panniculitis on inframammary, abdominal pannus and popliteal areas. Labs showed pancytopenia, lactic acidosis, KRISS. CT C/A/P showed small chronic appearing left pleural effusion with small fluid around the distal esophagus and the duodenum. SARS-CoV-2/influenza/RSV PCR were negative. Blood and urine cultures were obtained. She was started on vancomycin and cefepime.     ROS    Rest of 15 pt ROS negative      Infection Risk Factors    no Animal / bird exposure    has birds in backyard but no exposure to bird  droppings. No exposure to parturient animals.    no Travel / residence location    no recent travel. Traveled to [place] and [facility] >10 years ago.   no Environmental exposure      no Bites (mosquito/tick)      no Ill contacts      no TB/exposure / + PPD history      no HIV risks        PFSH  Past Medical History:   Diagnosis Date     Acute massive pulmonary embolism (H) 2019     Cardiac arrest (H) 2019    Due to PE; required ECMO     Chronic bronchitis (H) 07/30/2015     Chronic pain      Contact dermatitis      COPD (chronic obstructive pulmonary disease) (H)      Depressive disorder 1985     Eczema     HANDS     Elevated liver enzymes      H/O total knee replacement, left      History of total hip replacement 10/27/2011     Hyperlipidemia      Hypertension      Morbid obesity (H)      Osteoarthrosis     right knee     Paroxysmal atrial fibrillation (H)      RA (rheumatoid arthritis) (H) 8/2018     Sleep apnea      Tobacco use disorder      Varicella 1965     Past Surgical History:   Procedure Laterality Date     ARTHROPLASTY KNEE  6/14/2012    Procedure: ARTHROPLASTY KNEE;  Left Total Knee Arthroplasty;  Surgeon: Annette Quesada MD;  Location: [facility] OR     ARTHROSCOPY HIP Right      BRONCHOSCOPY FLEXIBLE AND RIGID N/A 9/17/2019    Procedure: Bronchoscopy flexible;  Surgeon: Patrick Rayo MD;  Location:  OR     COLONOSCOPY N/A 7/16/2021    Procedure: COLONOSCOPY, WITH POLYPECTOMY AND BIOPSY;  Surgeon: Diogo Lynch MD;  Location:  GI     CV EXTRACORPERAL MEMBRANE OXYGENATION N/A 9/9/2019    Procedure: Extracorporeal Membrance Oxygenation;  Surgeon: Kaleb Mcfarlane MD;  Location:  [facility]     INSERT EXTRACORPORAL MEMBRANE OXYGENATOR N/A 9/17/2019    Procedure: Venous-Venous cannulation using ultrasound guidance and transesophageal echocardiogram, venous-arterial ecmo decannulation and repair of left femoral artery;  Surgeon: Patrick Rayo MD;  Location:   OR     IR MECH THROMB PRIM ART, NON-INTRACRNL  9/10/2019     IR PULMONARY ANGIOGRAM BILATERAL  9/10/2019     IR THROMBOLYSIS ARTERIAL INFUSION INITIAL DAY  9/10/2019     THORACENTESIS N/A 2020    Procedure: THORACENTESIS;  Surgeon: Georgina Richardson MD;  Location: [facility] TOTAL HIP ARTHROPLASTY  04    RT      Problem (# of Occurrences) Relation (Name,Age of Onset)    Substance Abuse (3) Father (Dangelo): , Sister (Melissa): Sober, Sister (Kirsten): Sober 30 plus years    Cardiovascular (2) Paternal Aunt, Paternal Uncle    Depression (6) Mother (Shira Rodriguez), Sister (Melissa), Sister (Kirsten), Maternal Grandmother (Tea), Cousin (many), Other    Diabetes (1) Mother (Shira Rodriguez)    Heart Disease (2) Father (Dangelo), Sister (Melissa): multiple ablations    Hypertension (2) Mother (Shira Rodriguez), Maternal Grandmother (Tea)    Cerebrovascular Disease (1) Mother (Shira Rodriguez): CVA after endarderectomy-     Thyroid Disease (2) Mother (Shira Rodriguez), Sister (Kirsten)    Breast Cancer (1) Paternal Grandmother (Evgeny Rodriguez)    Prostate Cancer (1) Paternal Grandfather    Pancreatic Cancer (1) Paternal Grandmother (Evgeny Rodriguez)    Skin Cancer (1) Mother (Shira Rodriguez): MMohs surgery with skin graft    Alcoholism (2) Father (Dangelo), Sister (Melissa)        Family History   Problem Relation Age of Onset     Thyroid Disease Mother      Hypertension Mother      Skin Cancer Mother         MMohs surgery with skin graft     Cerebrovascular Disease Mother         CVA after endarderectomy-      Diabetes Mother      Depression Mother      Alcoholism Father      Heart Disease Father      Substance Abuse Father              Heart Disease Sister         multiple ablations     Alcoholism Sister      Substance Abuse Sister         Sober     Depression Sister      Substance Abuse Sister         Sober 30 plus years     Depression Sister      Thyroid  Disease Sister      Hypertension Maternal Grandmother      Depression Maternal Grandmother      Breast Cancer Paternal Grandmother      Pancreatic Cancer Paternal Grandmother      Prostate Cancer Paternal Grandfather      Cardiovascular Paternal Aunt      Cardiovascular Paternal Uncle      Depression Cousin      Depression Other      Social History     Socioeconomic History     Marital status: Single   Occupational History     Occupation: registered nurse     Comment: [employer]   Tobacco Use     Smoking status: Former     Packs/day: 0.00     Years: 33.00     Pack years: 0.00     Types: Cigarettes     Start date: 1/1/1982     Quit date: 9/9/2019     Years since quitting: 3.3     Smokeless tobacco: Never   Vaping Use     Vaping Use: Never used   Substance and Sexual Activity     Alcohol use: Yes     Comment: Rarely     Drug use: No     Sexual activity: Not Currently     Birth control/protection: Abstinence, Post-menopausal     Allergies   Allergen Reactions     Adhesive Tape Blisters     Erythromycin Rash     Immunization History   Administered Date(s) Administered     COVID-19 Vaccine 12+ (Pfizer 2022) 05/24/2022     COVID-19 Vaccine 12+ (Pfizer) 03/12/2021, 04/02/2021, 12/30/2021     COVID-19 Vaccine Bivalent Booster 12+ (Pfizer) 12/06/2022     Influenza (IIV3) PF 10/04/2011, 10/03/2012, 09/24/2016, 10/16/2017     Influenza Vaccine 50-64 or 18-64 w/egg allergy (Flublok) 09/29/2019, 10/12/2022     Influenza Vaccine >6 months (Alfuria,Fluzone) 10/03/2018, 12/30/2021     Pneumo Conj 13-V (2010&after) 08/28/2017, 05/23/2019     Pneumococcal 23 valent 12/04/2017     TDAP Vaccine (Boostrix) 08/21/2015     Tdap (Adacel,Boostrix) 07/16/2010     Zoster vaccine recombinant adjuvanted (SHINGRIX) 06/04/2018, 05/23/2019     EXAM  BP (!) 109/99   Pulse 99   Temp 98.1  F (36.7  C) (Oral)   Resp 19   SpO2 100%     general     Awake, alert, not in distress     resp     Symmetrical chest expansion, no retractions, decreased  breath sounds, no crackles/wheezes   CV     S1 and S2, RRR, no murmur, no rub   GI     NABS, soft, non-tender, no hepatosplenomegaly.   Mus-Skel     No edema, no effusion.   neuro     AAO x 3, no focal deficits   psyche     Appropriate mood and affect   skin     Dry, no rash or lesions.   Panniculitis on both inframammary areas R>L, also pannus over abdomen and on both popliteal areas R>L.  Chronic erythema on the left lateral ankle with no limitation in ROM of left ankle.    HEENT     Non-hyperemic posterior pharyngeal wall, no exudates, no thrush, no ulcers. No sinus tenderness. Fair dentition.   Lymph nodes   No cervical lymphadenopathy         DATA  Images  1/29/23 CT C/A/P:  IMPRESSION:  1.  Areas of mostly linear scarring and/or atelectasis in the lungs greatest in the left lung base. Nothing definite for acute pneumonitis.     2.  Stable chronic appearing small left pleural effusion.     3.  Small amount of fluid seen adjacent to the distal thoracic esophagus of uncertain significance. Scattered nonspecific posterior mediastinal lymph nodes along the course of the esophagus. These findings are of uncertain significance or etiology.   Correlation for any signs of esophagitis or other inflammatory process.     4.  Cholelithiasis. No biliary dilatation.     5.  Question small amount of edema seen around the second and third portions of the duodenum. Clinical correlation to exclude any signs of a duodenal inflammatory process. Correlation with pancreatic enzymes also suggested to exclude pancreatitis.     6.  3 cm right adrenal nodule is technically indeterminate on this study but unchanged from prior CT scan and reportedly represents a benign adrenal adenoma based off a prior MRI.     7.  Scattered colonic diverticuli without evidence for diverticulitis. Normal appendix.    1/29/23 head CT:                                                  IMPRESSION:  1.  No CT evidence for acute intracranial process.  2.  Brain  atrophy and presumed chronic microvascular ischemic changes as above.    Microbiology test results    Microbiology data    Blood cultures   1/28   NGTD   SARS-CoV-2/influenza/RSV PCR 1/28   negative   Urine culture   1/28   pending     1/16/20 pleural fluid studies:  Pleural fluid cultures: NG        Pleural Fluid cytology:   - Negative for malignancy   - Acute inflammation present.     LABS  Recent Labs   Lab Test 01/29/23  0626 01/28/23  2221 12/07/22  1340 12/06/22  1049 01/14/20  1039 01/14/20  0837   WBC 0.3* 0.4*   < > 15.4*   < >  --    CREAT  --   --   --   --   --  0.7   AST  --  28  --  12   < >  --    ALT  --  13  --  12   < >  --    BILITOTAL  --  0.7  --  0.3   < >  --    ALKPHOS  --  57  --  65   < >  --     < > = values in this interval not displayed.          ASSESSMENT   MED DECISION MAKING  Patient Active Problem List   Diagnosis Code     Primary localized osteoarthrosis, pelvic region and thigh M16.10     Adjustment disorder with depressed mood F43.21     Tobacco use disorder F17.200     Morbid obesity (H) E66.01     Arthritis of knee, degenerative M17.9     Degeneration of cervical intervertebral disc M50.30     CYRUS (obstructive sleep apnea) G47.33     Elevated blood pressure (not hypertension) R03.0     Pulmonary emboli (H) I26.99     Rheumatoid arthritis (H) M06.9     Cardiac arrest (H) I46.9     Long term current use of anticoagulant therapy Z79.01     Bacterial sepsis (H) A41.9     Neutropenic fever (H) D70.9, R50.81     Neutropenic sepsis - DDx: bacteremia, reactivation of latent viral infection (CMV, EBV, HSV), tick-borne infection (RMSF, ehrlichia, anaplasma) although no clear exposure, fungal infection or endemic mycoses    Panniculitis  Pancytopenia  Lactic acidosis  KRISS    Rheumatoid arthritis   --on methotrexate for 5 years (previously on subcutaneous and then transitioned to po 3 weeks PTA), not on biologics or chronic steroids    Chronic left pleural effusion  Chronic cough   Hx  of massive PE causing PEA arrest requiring ECMO (admitted September to October 2019)  Super morbid obesity  Chronic back pain with acute worsening      PLANS:  Await 1/28/23 blood and urine cultures.  Continue vancomycin and cefepime with pharmacy dosing.  Add doxycycline 100 mg/IV q12h for empiric coverage for tick-borne infection pending work-up.  Will adjust antibiotics once cultures available.   Send RMSF Ab, ehrlichia and anaplasma serologies and PCRs.  Serum fungitell, serum & urine histoplasma Ag and blastomyces Ag.  Serum CMV/EBV/HSV PCR and also CMV & EBV serologies; HIV Ab screen.    Flow cytometry, EMERY, ANCA anti-GBM Ab, complement levels pending.  If with continued worsening back pain, recommend MRI of lumbar spine w/ and w/o contrast to evaluate for discitis.     Discussed in details with patient and RN.          Eliseo Robins MD, FACP  Covering for Ashley Strong & Jorge & Audra  Infectious Disease service      Current Meds Reviewed  Current Facility-Administered Medications   Medication     acetaminophen (TYLENOL) tablet 650 mg    Or     acetaminophen (TYLENOL) Suppository 650 mg     ARIPiprazole (ABILIFY) tablet 5 mg     [START ON 1/30/2023] ceFEPIme (MAXIPIME) 2 g vial to attach to  ml bag for ADULTS or 50 ml bag for PEDS     cyanocobalamin injection 1,000 mcg     DULoxetine (CYMBALTA) DR capsule 120 mg     gabapentin (NEURONTIN) capsule 100 mg     ipratropium - albuterol 0.5 mg/2.5 mg/3 mL (DUONEB) neb solution 3 mL     lidocaine (LMX4) cream     lidocaine (LMX4) cream     lidocaine (PF) (XYLOCAINE) 1 % injection 2 mL     lidocaine (PF) (XYLOCAINE) 1 % injection 3 mL     lidocaine (PF) (XYLOCAINE) 1 % injection 4 mL     lidocaine 1 % 0.1-1 mL     lidocaine 1 % 0.1-1 mL     melatonin tablet 3 mg     NaCl 0.45 % 1,000 mL with sodium bicarbonate 75 mEq/L infusion     nitroGLYcerin (NITROSTAT) sublingual tablet 0.4 mg     nystatin (MYCOSTATIN) cream     ondansetron (ZOFRAN ODT) ODT tab 4 mg    Or      ondansetron (ZOFRAN) injection 4 mg     pantoprazole (PROTONIX) IV push injection 40 mg     senna-docusate (SENOKOT-S/PERICOLACE) 8.6-50 MG per tablet 1 tablet    Or     senna-docusate (SENOKOT-S/PERICOLACE) 8.6-50 MG per tablet 2 tablet     sodium chloride (PF) 0.9% PF flush 3 mL     sodium chloride (PF) 0.9% PF flush 3 mL     sodium chloride (PF) 0.9% PF flush 3 mL     sodium chloride (PF) 0.9% PF flush 3 mL     triamcinolone (KENALOG-40) injection 40 mg     triamcinolone (KENALOG-40) injection 40 mg     vancomycin (VANCOCIN) 500 mg vial to attach to  mL bag     warfarin ANTICOAGULANT (COUMADIN) tablet 5 mg     WARFARIN THERAPY REMINDER     Current Outpatient Medications   Medication Sig     acetaminophen (TYLENOL) 325 MG tablet Take 2 tablets (650 mg) by mouth every 6 hours as needed for mild pain or fever     albuterol (PROAIR HFA/PROVENTIL HFA/VENTOLIN HFA) 108 (90 Base) MCG/ACT inhaler Inhale 2 puffs into the lungs every 6 hours as needed for shortness of breath / dyspnea or wheezing     ARIPiprazole (ABILIFY) 10 MG tablet Take 1 tablet (10 mg) by mouth daily (Patient taking differently: Take 5 mg by mouth daily)     DULoxetine (CYMBALTA) 60 MG capsule Take 1 capsule (60 mg) by mouth daily (Patient taking differently: Take 120 mg by mouth daily)     lisinopril (ZESTRIL) 5 MG tablet Take 1 tablet (5 mg) by mouth daily     methotrexate 2.5 MG tablet Take 10 tablets (25 mg) by mouth every 7 days Labs every 8 - 12 weeks for refills. (Patient taking differently: Take 25 mg by mouth every 7 days Labs every 8 - 12 weeks for refills. Takes on Tuesdays.)     traMADol (ULTRAM) 50 MG tablet Take 1 tablet (50 mg) by mouth every 6 hours as needed for severe pain (7-10)     VITAMIN D3 25 MCG (1000 UT) tablet TAKE 2 TABLETS BY MOUTH EVERY DAY     warfarin ANTICOAGULANT (COUMADIN) 5 MG tablet TAKE 1 TABLET BY MOUTH DAILY OR AS DIRECTED BY ANTICOAGULATION CLINIC (Patient taking differently: TAKE 1 TABLET BY MOUTH  "DAILY OR AS DIRECTED BY ANTICOAGULATION CLINIC.  As of 1/28/23: Take 5mg Monday and 2.5mg all other days.)     gabapentin (NEURONTIN) 100 MG capsule Take 1 capsule (100 mg) by mouth 3 times daily     insulin syringe-needle U-100 (BD INSULIN SYRINGE ULTRAFINE) 30G X 1/2\" 1 ML miscellaneous For Methotrexate use weekly.     Facility-Administered Medications Ordered in Other Encounters   Medication     sodium chloride (PF) 0.9% PF flush 10 mL         "

## 2023-01-29 NOTE — CONSULTS
RENAL CONSULTATION NOTE      REFERRING MD:  Samaria    REASON FOR CONSULTATION:  Elevated creatinine         A/P:     1.  Apparent subacute kidney injury on the basis of data review.    We note near baseline normal renal function up until approximately May 2022.    Since that time creatinine has been in the range of approximately 1.5 to 2.0 mg/dL.  Etiology unclear.  She does take lisinopril for hypertension.  Not known to be diabetic.  She does not use NSAIDs.    2.  Previous acute kidney injury 2019 with apparent resolution.  3.  Elevated creatinine representing acute on subacute injury and or some degree of progression.    Presumably in part prerenal.  We have seen some improvement with hydration overnight.  Cannot rule out small ACE affect in this setting.  Renal imaging demonstrating normal kidneys.  Urine suggest calcium oxalate nephrolithiasis.    4.  Hematuria  5.  Dipstick proteinuria  6.  Non-anion gap metabolic acidosis  7.  Sepsis and associated neutropenic/thrombocytopenia    Patient has been seen by hematology.  Work-up underway.      8.  Hypomagnesemia secondary to PPI  9.  Modest hyponatremia    Document urine anion gap.  This is presumably renal bicarbonate loss.    Continue IV fluid but add bicarbonate.  Quantify protein excretion  Serologic work-up    Avoid further nephrotoxic agents at this time.      HPI:     Patient presented to the ED dated 01/29/23 in the setting of hypotension and concern for severe sepsis.  She was found to be neutropenic.  She is found to be in acute on chronic renal insufficiency.  Electrolyte abnormalities were noted.    She denies NSAID use.  She does take lisinopril low-dose.  No other significant over-the-counter medications per patient.    No recent alterations or changes in urinary tract habits by her report.  No system suggestive of nephrolithiasis.      ROS:  A complete 10 point review of systems was performed and is negative except as noted above.    PMH:    Past  Medical History:   Diagnosis Date     Acute massive pulmonary embolism (H) 2019     Cardiac arrest (H) 2019    Due to PE; required ECMO     Chronic bronchitis (H) 07/30/2015     Chronic pain      Contact dermatitis      COPD (chronic obstructive pulmonary disease) (H)      Depressive disorder 1985     Eczema     HANDS     Elevated liver enzymes      H/O total knee replacement, left      History of total hip replacement 10/27/2011     Hyperlipidemia      Hypertension      Morbid obesity (H)      Osteoarthrosis     right knee     Paroxysmal atrial fibrillation (H)      RA (rheumatoid arthritis) (H) 8/2018     Sleep apnea      Tobacco use disorder      Varicella 1965       PSH:    Past Surgical History:   Procedure Laterality Date     ARTHROPLASTY KNEE  6/14/2012    Procedure: ARTHROPLASTY KNEE;  Left Total Knee Arthroplasty;  Surgeon: Annette Quesada MD;  Location: [facility]     ARTHROSCOPY HIP Right      BRONCHOSCOPY FLEXIBLE AND RIGID N/A 9/17/2019    Procedure: Bronchoscopy flexible;  Surgeon: Patrick Rayo MD;  Location: [facility]     COLONOSCOPY N/A 7/16/2021    Procedure: COLONOSCOPY, WITH POLYPECTOMY AND BIOPSY;  Surgeon: Diogo Lynch MD;  [facility]     CV EXTRACORPERAL MEMBRANE OXYGENATION N/A 9/9/2019    Procedure: Extracorporeal Membrance Oxygenation;  Surgeon: Kaleb Mcfarlane MD;  [facility]     INSERT EXTRACORPORAL MEMBRANE OXYGENATOR N/A 9/17/2019    Procedure: Venous-Venous cannulation using ultrasound guidance and transesophageal echocardiogram, venous-arterial ecmo decannulation and repair of left femoral artery;  Surgeon: Patrick Rayo MD;  Location: [facility]     IR MECH THROMB PRIM ART, NON-INTRACRNL  9/10/2019     IR PULMONARY ANGIOGRAM BILATERAL  9/10/2019     IR THROMBOLYSIS ARTERIAL INFUSION INITIAL DAY  9/10/2019     THORACENTESIS N/A 1/16/2020    Procedure: THORACENTESIS;  Surgeon: Georgina Richardson MD;  Location:  GI     Z TOTAL  "HIP ARTHROPLASTY  07/09/04    RT       MEDICATIONS:    Current Outpatient Medications   Medication Sig Dispense Refill     acetaminophen (TYLENOL) 325 MG tablet Take 2 tablets (650 mg) by mouth every 6 hours as needed for mild pain or fever       albuterol (PROAIR HFA/PROVENTIL HFA/VENTOLIN HFA) 108 (90 Base) MCG/ACT inhaler Inhale 2 puffs into the lungs every 6 hours as needed for shortness of breath / dyspnea or wheezing 18 g 5     ARIPiprazole (ABILIFY) 10 MG tablet Take 1 tablet (10 mg) by mouth daily (Patient taking differently: Take 5 mg by mouth daily) 90 tablet 1     DULoxetine (CYMBALTA) 60 MG capsule Take 1 capsule (60 mg) by mouth daily (Patient taking differently: Take 120 mg by mouth daily) 30 capsule 0     lisinopril (ZESTRIL) 5 MG tablet Take 1 tablet (5 mg) by mouth daily 90 tablet 3     methotrexate 2.5 MG tablet Take 10 tablets (25 mg) by mouth every 7 days Labs every 8 - 12 weeks for refills. (Patient taking differently: Take 25 mg by mouth every 7 days Labs every 8 - 12 weeks for refills. Takes on Tuesdays.) 40 tablet 2     traMADol (ULTRAM) 50 MG tablet Take 1 tablet (50 mg) by mouth every 6 hours as needed for severe pain (7-10) 10 tablet 0     VITAMIN D3 25 MCG (1000 UT) tablet TAKE 2 TABLETS BY MOUTH EVERY DAY       warfarin ANTICOAGULANT (COUMADIN) 5 MG tablet TAKE 1 TABLET BY MOUTH DAILY OR AS DIRECTED BY ANTICOAGULATION CLINIC (Patient taking differently: TAKE 1 TABLET BY MOUTH DAILY OR AS DIRECTED BY ANTICOAGULATION CLINIC.  As of 1/28/23: Take 5mg Monday and 2.5mg all other days.) 90 tablet 1     gabapentin (NEURONTIN) 100 MG capsule Take 1 capsule (100 mg) by mouth 3 times daily 90 capsule 1     insulin syringe-needle U-100 (BD INSULIN SYRINGE ULTRAFINE) 30G X 1/2\" 1 ML miscellaneous For Methotrexate use weekly. 12 each 3       ALLERGIES:    Allergies as of 01/28/2023 - Reviewed 01/28/2023   Allergen Reaction Noted     Adhesive tape Blisters 01/05/2005     Erythromycin Rash 06/02/2004 "       FH:    Family History   Problem Relation Age of Onset     Thyroid Disease Mother      Hypertension Mother      Skin Cancer Mother         MMohs surgery with skin graft     Cerebrovascular Disease Mother         CVA after endarderectomy-      Diabetes Mother      Depression Mother      Alcoholism Father      Heart Disease Father      Substance Abuse Father              Heart Disease Sister         multiple ablations     Alcoholism Sister      Substance Abuse Sister         Sober     Depression Sister      Substance Abuse Sister         Sober 30 plus years     Depression Sister      Thyroid Disease Sister      Hypertension Maternal Grandmother      Depression Maternal Grandmother      Breast Cancer Paternal Grandmother      Pancreatic Cancer Paternal Grandmother      Prostate Cancer Paternal Grandfather      Cardiovascular Paternal Aunt      Cardiovascular Paternal Uncle      Depression Cousin      Depression Other        SH:    Social History     Socioeconomic History     Marital status: Single     Spouse name: Not on file     Number of children: Not on file     Years of education: Not on file     Highest education level: Not on file   Occupational History     Occupation: registered nurse     Comment: [employer]   Tobacco Use     Smoking status: Former     Packs/day: 0.00     Years: 33.00     Pack years: 0.00     Types: Cigarettes     Start date: 1982     Quit date: 2019     Years since quitting: 3.3     Smokeless tobacco: Never   Vaping Use     Vaping Use: Never used   Substance and Sexual Activity     Alcohol use: Yes     Comment: Rarely     Drug use: No     Sexual activity: Not Currently     Birth control/protection: Abstinence, Post-menopausal   Other Topics Concern     Parent/sibling w/ CABG, MI or angioplasty before 65F 55M? Not Asked   Social History Narrative     Not on file     Social Determinants of Health     Financial Resource Strain: Not on file   Food Insecurity: Not  on file   Transportation Needs: Not on file   Physical Activity: Not on file   Stress: Not on file   Social Connections: Not on file   Intimate Partner Violence: Not on file   Housing Stability: Not on file       PHYSICAL EXAM:      Vitals were reviewed  Patient Vitals for the past 8 hrs:   BP Temp Temp src Pulse Resp SpO2   01/29/23 1159 -- 98.1  F (36.7  C) Oral -- -- --   01/29/23 1100 128/62 -- -- 86 21 100 %   01/29/23 1000 119/67 -- -- 92 20 100 %   01/29/23 0900 132/72 -- -- 93 21 100 %   01/29/23 0800 (!) 118/93 -- -- 90 20 99 %   01/29/23 0731 -- 98.7  F (37.1  C) -- -- -- --     No intake/output data recorded.      There were no vitals filed for this visit.      GENERAL: awake, alert, follows  HEENT: NC/AT, PERRLA, EOMI, non icteric, pharynx moist without lesion  RESP:  clear anteriorly  CV: RRR, normal S1 S2  ABDOMEN: soft, nontender, no HSM or masses and bowel sounds normal  MS: no clubbing, cyanosis   EXT: edema       LABS:        Recent Labs   Lab 01/29/23 0626   *   POTASSIUM 4.6   CHLORIDE 108*   CO2 12*   ANIONGAP 15   GLC 73   BUN 54.6*   CR 2.88*   GFRESTIMATED 18*   MARIA INES 8.4*     Recent Labs   Lab Test 01/29/23  0626 01/28/23  2221 12/07/22  1340 12/06/22  1049 10/19/22  1335 05/24/22  0959 10/06/21  1135 06/16/21  1603 01/13/21  1419 09/24/20  1415   CR 2.88* 3.25* 2.18* 2.11* 1.42* 1.76* 0.76 0.74 0.74 0.73     Recent Labs   Lab 01/28/23 2221   ALBUMIN 2.5*     Recent Labs   Lab 01/29/23  0626 01/28/23 2221   HGB 9.2* 9.4*     Recent Labs   Lab 01/28/23  2322   COLOR Orange*   APPEARANCE Slightly Cloudy*   URINEGLC Negative   URINEBILI Negative   URINEKETONE Negative   SG 1.012   UBLD Moderate*   URINEPH 5.0   PROTEIN 30*   NITRITE Negative   LEUKEST Negative   RBCU 5*   WBCU 0       DIAGNOSTICS:  Reviewed      SILVINO Blank    [facility]  [phone]

## 2023-01-29 NOTE — CONSULTS
[facility]    Hematology / Oncology Consultation     Date of Admission:  1/28/2023  Date of Consult (When I saw the patient): 01/29/23    Assessment & Plan   Shira Rodriguez is a 62 year old female who was admitted on 1/28/2023. I was asked to see the patient for pancytopenia.        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 She notes that she has felt weak for the last 2 months or so. She has rheumatoid arthritis and has been restarted on methotrexate 2 weeks ago. She notes that yesterday she did not have strength to keep standing and sat on the ground. She could not get up on her own and was on the ground for over 5 hrs. She notes that her sister was worried and checked on her only to find her on the ground. EMS was notified and found her hypotensive with systolic blood pressure close to 50 mm of Hg per patient. She had an episode of fever yesterday. She has acute renal failure and pancytopenia. She has recently restarted her methotrexate about 2 weeks ago. She was febrile at presentation. Her labs have also revealed lactic acidosis.     She has sepsis which can be precipitated by her severe neutropenia or her neutropenia could be a result of her sepsis. I would initiate work up for her pancytopenia. I will check for B12/folate, LDH, uric acid. A peripheral smear has been ordered. I will also get protein electrophoresis with immunofixation and free light chains to assess for myeloma. I will get peripheral blood flow cytometry to evaluate for acute leukemia. We would possibly need bone marrow biopsy if her pancytopenia persists despite resolution of her infection. I have also requested for immunoglobulin levels which if low; we could administer intravenous Ig G.     I have ordered for coagulation profile including PTT,  fibrinogen and also d-dimer to check for DIC. She has nose bleeds. Platelet counts have dropped from 271k to 59k. It should still not cause bleeding. There was platelet clumping on CBC drawn today.      Addendum: Her vitamin B12 levels are undetectable. I will order for vitamin B12 supplementation - cyanocobalamin 1000 mcg intramuscularly daily until levels normalize. Her folate is pending and needs to be drawn. I have requested that the get labs checked. There could be a concomitant folate deficiency. Correction of her B12 and folate should help her pancytopenia.     I have reviewed her case with Dr. Gary in hospitalist service.     Over 80 min spent on day of visit including review of tests, obtaining/reviewing separately obtained history/physical exam, counseling patient, ordering medications/tests/procedures, communicating with PCP/consultants, and documenting in electronic medical record.    Julio César Hills  Hematologist and Medical Oncologist  [facility]     Julio César Hills MD, MD    Code Status    Full Code    Reason for Consult   Reason for consult: I was asked by Dr. Mera to evaluate this patient for pancytopenia.    Primary Care Physician   Anjel Busby    Chief Complaint   unresponsive    History is provided by patient     History of Present Illness   Shira Rodriguez is a 62 year old female who was found hypotensive.     History as above.     Past Medical History   I have reviewed this patient's medical history and updated it with pertinent information if needed.   Past Medical History:   Diagnosis Date     Acute massive pulmonary embolism (H) 2019     Cardiac arrest (H) 2019    Due to PE; required ECMO     Chronic bronchitis (H) 07/30/2015     Chronic pain      Contact dermatitis      COPD (chronic obstructive pulmonary disease) (H)      Depressive disorder 1985     Eczema     HANDS     Elevated liver enzymes      H/O total knee replacement, left      History of total hip replacement  10/27/2011     Hyperlipidemia      Hypertension      Morbid obesity (H)      Osteoarthrosis     right knee     Paroxysmal atrial fibrillation (H)      RA (rheumatoid arthritis) (H) 8/2018     Sleep apnea      Tobacco use disorder      Varicella 1965       Past Surgical History   I have reviewed this patient's surgical history and updated it with pertinent information if needed.  Past Surgical History:   Procedure Laterality Date     ARTHROPLASTY KNEE  6/14/2012    Procedure: ARTHROPLASTY KNEE;  Left Total Knee Arthroplasty;  Surgeon: Annette Quesada MD;  Location: [facility] OR     ARTHROSCOPY HIP Right      BRONCHOSCOPY FLEXIBLE AND RIGID N/A 9/17/2019    Procedure: Bronchoscopy flexible;  Surgeon: Patrick Rayo MD;  Location: [facility] OR     COLONOSCOPY N/A 7/16/2021    Procedure: COLONOSCOPY, WITH POLYPECTOMY AND BIOPSY;  Surgeon: Diogo Lynch MD;  Location: [facility] GI     CV EXTRACORPERAL MEMBRANE OXYGENATION N/A 9/9/2019    Procedure: Extracorporeal Membrance Oxygenation;  Surgeon: Kaleb Mcfarlane MD;  [facility]     INSERT EXTRACORPORAL MEMBRANE OXYGENATOR N/A 9/17/2019    Procedure: Venous-Venous cannulation using ultrasound guidance and transesophageal echocardiogram, venous-arterial ecmo decannulation and repair of left femoral artery;  Surgeon: Patrick Rayo MD;  Location: [facility] OR     IR MECH THROMB PRIM ART, NON-INTRACRNL  9/10/2019     IR PULMONARY ANGIOGRAM BILATERAL  9/10/2019     IR THROMBOLYSIS ARTERIAL INFUSION INITIAL DAY  9/10/2019     THORACENTESIS N/A 1/16/2020    Procedure: THORACENTESIS;  Surgeon: Georgina Richardson MD;  Location:  GI     ZZC TOTAL HIP ARTHROPLASTY  07/09/04    RT       Prior to Admission Medications   Prior to Admission Medications   Prescriptions Last Dose Informant Patient Reported? Taking?   ARIPiprazole (ABILIFY) 10 MG tablet 1/28/2023 at am Self No Yes   Sig: Take 1 tablet (10 mg) by mouth daily   Patient taking differently:  "Take 5 mg by mouth daily   DULoxetine (CYMBALTA) 60 MG capsule 1/28/2023 at am Self No Yes   Sig: Take 1 capsule (60 mg) by mouth daily   Patient taking differently: Take 120 mg by mouth daily   VITAMIN D3 25 MCG (1000 UT) tablet 1/28/2023 at am Self Yes Yes   Sig: TAKE 2 TABLETS BY MOUTH EVERY DAY   acetaminophen (TYLENOL) 325 MG tablet prn at prn Self No Yes   Sig: Take 2 tablets (650 mg) by mouth every 6 hours as needed for mild pain or fever   albuterol (PROAIR HFA/PROVENTIL HFA/VENTOLIN HFA) 108 (90 Base) MCG/ACT inhaler prn at prn Self No Yes   Sig: Inhale 2 puffs into the lungs every 6 hours as needed for shortness of breath / dyspnea or wheezing   gabapentin (NEURONTIN) 100 MG capsule  Self No No   Sig: Take 1 capsule (100 mg) by mouth 3 times daily   insulin syringe-needle U-100 (BD INSULIN SYRINGE ULTRAFINE) 30G X 1/2\" 1 ML miscellaneous   No No   Sig: For Methotrexate use weekly.   lisinopril (ZESTRIL) 5 MG tablet 1/28/2023 at am Self No Yes   Sig: Take 1 tablet (5 mg) by mouth daily   methotrexate 2.5 MG tablet 1/24/2023 Self No Yes   Sig: Take 10 tablets (25 mg) by mouth every 7 days Labs every 8 - 12 weeks for refills.   Patient taking differently: Take 25 mg by mouth every 7 days Labs every 8 - 12 weeks for refills. Takes on Tuesdays.   methotrexate 50 MG/2ML injection  Self No No   Sig: Inject 1 mL (25 mg) Subcutaneous every 7 days   omeprazole (PRILOSEC) 20 MG DR capsule  Self No No   Sig: Take 1 capsule (20 mg) by mouth daily   traMADol (ULTRAM) 50 MG tablet prn at prn Self No Yes   Sig: Take 1 tablet (50 mg) by mouth every 6 hours as needed for severe pain (7-10)   warfarin ANTICOAGULANT (COUMADIN) 5 MG tablet 1/28/2023 at am Self No Yes   Sig: TAKE 1 TABLET BY MOUTH DAILY OR AS DIRECTED BY ANTICOAGULATION CLINIC   Patient taking differently: TAKE 1 TABLET BY MOUTH DAILY OR AS DIRECTED BY ANTICOAGULATION CLINIC.  As of 1/28/23: Take 5mg Monday and 2.5mg all other days.      Facility-Administered " Medications Last Administration Doses Remaining   lidocaine (PF) (XYLOCAINE) 1 % injection 2 mL 2022  9:23 AM    lidocaine (PF) (XYLOCAINE) 1 % injection 3 mL 2022  9:23 AM    lidocaine (PF) (XYLOCAINE) 1 % injection 4 mL 2022  9:23 AM    triamcinolone (KENALOG-40) injection 40 mg 2022  9:23 AM    triamcinolone (KENALOG-40) injection 40 mg 2022  9:23 AM         Allergies   Allergies   Allergen Reactions     Adhesive Tape Blisters     Erythromycin Rash       Social History   I have reviewed this patient's social history and updated it with pertinent information if needed. Shira Rodriguez  reports that she quit smoking about 3 years ago. Her smoking use included cigarettes. She started smoking about 41 years ago. She has never used smokeless tobacco. She reports current alcohol use. She reports that she does not use drugs. She quit smoking after her bilateral pulmonary embolism and cardiac arrest.     Family History   I have reviewed this patient's family history and updated it with pertinent information if needed.   Family History   Problem Relation Age of Onset     Thyroid Disease Mother      Hypertension Mother      Skin Cancer Mother         MMohs surgery with skin graft     Cerebrovascular Disease Mother         CVA after endarderectomy-      Diabetes Mother      Depression Mother      Alcoholism Father      Heart Disease Father      Substance Abuse Father              Heart Disease Sister         multiple ablations     Alcoholism Sister      Substance Abuse Sister         Sober     Depression Sister      Substance Abuse Sister         Sober 30 plus years     Depression Sister      Thyroid Disease Sister      Hypertension Maternal Grandmother      Depression Maternal Grandmother      Breast Cancer Paternal Grandmother      Pancreatic Cancer Paternal Grandmother      Prostate Cancer Paternal Grandfather      Cardiovascular Paternal Aunt      Cardiovascular  Paternal Uncle      Depression Cousin      Depression Other        Review of Systems   The 10 point Review of Systems is negative other than noted in the HPI or here.      Physical Exam   Temp: 98.1  F (36.7  C) Temp src: Oral BP: 128/62 Pulse: 86   Resp: 21 SpO2: 100 % O2 Device: None (Room air)    Vital Signs with Ranges  Temp:  [98.1  F (36.7  C)-100.2  F (37.9  C)] 98.1  F (36.7  C)  Pulse:  [] 86  Resp:  [16-28] 21  BP: ()/(45-93) 128/62  SpO2:  [99 %-100 %] 100 %  0 lbs 0 oz      Data   Recent Labs   Lab Test 01/29/23 0626 01/28/23  2221 12/07/22  1340 12/06/22  1049 10/19/22  1335 05/24/22  0959   * 132* 137 139  --  140   POTASSIUM 4.6 5.2 4.5 5.0  --  4.7   CHLORIDE 108* 103 105 106  --  109   CO2 12* 13* 14* 18*  --  26   ANIONGAP 15 16* 18* 15  --  5   BUN 54.6* 55.5* 55.0* 46.5*  --  29   CR 2.88* 3.25* 2.18* 2.11* 1.42* 1.76*   GLC 73 84 90 146*  --  105*   MARIA INES 8.4* 8.9 9.3 9.7  --  9.7     Recent Labs   Lab Test 01/28/23  2221 10/10/19  0831 10/07/19  0716 10/06/19  0702 10/05/19  0521 10/04/19  0704 10/03/19  2049 10/03/19  0604 10/02/19  0545 10/01/19  0548 09/30/19  0625   MAG 1.4* 1.9 2.2 2.0 2.0 2.0   < > 2.1 2.2 1.9 2.2   PHOS  --   --   --   --   --  4.2  --  4.3 4.4 3.9 4.0    < > = values in this interval not displayed.     Recent Labs   Lab Test 01/29/23 0626 01/28/23 2221 12/07/22  1340 12/06/22  1049 05/24/22  0959 10/06/21  1135 01/13/21  1419 09/24/20  1415   WBC 0.3* 0.4* 10.6 15.4* 10.0 4.2   < > 6.8   HGB 9.2* 9.4* 13.4 13.9 14.9 14.3   < > 15.0   PLT  --  59* 271 369 290 86*   < > 146*   MCV 89 90 88 89 96 95   < > 91   NEUTROPHIL  --  24 78 86 75  --   --  77.7    < > = values in this interval not displayed.     Recent Labs   Lab Test 01/28/23  2221 12/06/22  1049 10/24/22  1138 05/24/22  0959 10/06/19  0702 10/05/19  0521 09/24/19  0347 09/23/19  0530   BILITOTAL 0.7 0.3  --  0.4   < > 0.4   < >  --    ALKPHOS 57 65  --  70   < > 65   < >  --    ALT 13 12  --   15   < > 18   < >  --    AST 28 12  --  14   < > 12   < >  --    ALBUMIN 2.5* 3.6  --  3.5   < > 2.8*   < >  --    LDH  --   --  300*  --   --  202  --  273*    < > = values in this interval not displayed.     TSH   Date Value Ref Range Status   12/06/2022 1.60 0.30 - 4.20 uIU/mL Final   05/24/2022 2.23 0.40 - 4.00 mU/L Final   06/16/2021 1.43 0.40 - 4.00 mU/L Final   09/24/2020 1.59 0.40 - 4.00 mU/L Final   06/12/2020 1.49 0.40 - 4.00 mU/L Final     No results for input(s): CEA in the last 65125 hours.  Results for orders placed or performed during the hospital encounter of 01/28/23   XR Chest Port 1 View    Narrative    EXAM: XR CHEST PORT 1 VIEW  LOCATION: [facility]  DATE/TIME: 1/28/2023 11:57 PM    INDICATION: Sepsis.  COMPARISON: 12/06/2022      Impression    IMPRESSION:     No focal airspace disease. No pleural effusion or pneumothorax.    The cardiomediastinal silhouette is unremarkable. Aortic calcifications.   CT Head w/o Contrast    Narrative    EXAM: CT HEAD W/O CONTRAST  LOCATION: [facility]  DATE/TIME: 1/29/2023 12:33 AM    INDICATION: Delirium and unresponsiveness  COMPARISON:  CT head 09/10/2019.  TECHNIQUE: Routine CT Head without IV contrast. Multiplanar reformats. Dose reduction techniques were used.    FINDINGS:  INTRACRANIAL CONTENTS: No intracranial hemorrhage, extraaxial collection, or mass effect.  No CT evidence of acute infarct. Mild presumed chronic small vessel ischemic changes. Mild generalized volume loss. No hydrocephalus.     VISUALIZED ORBITS/SINUSES/MASTOIDS: No intraorbital abnormality. No significant paranasal sinus mucosal disease. No middle ear or mastoid effusion.    BONES/SOFT TISSUES: No acute abnormality.      Impression    IMPRESSION:  1.  No CT evidence for acute intracranial process.  2.  Brain atrophy and presumed chronic microvascular ischemic changes as above.   CT Chest Abdomen Pelvis w/o Contrast    Narrative     EXAM: CT CHEST ABDOMEN PELVIS W/O CONTRAST  LOCATION: [facility]  DATE/TIME: 1/29/2023 12:32 AM    INDICATION: Sepsis.  COMPARISON: Chest CT from 7/21/2022 and 5/24/2022.  TECHNIQUE: CT scan of the chest, abdomen, and pelvis was performed without IV contrast. Multiplanar reformats were obtained. Dose reduction techniques were used.   CONTRAST: None.    FINDINGS:   LUNGS AND PLEURA: Patchy mostly linear opacities left lower lobe inferiorly favored to be due to some chronic scarring and/or atelectasis and similar to previous. Additional scattered areas of mild subpleural scarring or atelectasis elsewhere in the   periphery of the lungs. No definite acute appearing infiltrates or consolidation. Central airways are clear. Small chronic appearing left pleural effusion is unchanged.    MEDIASTINUM/AXILLAE: Small amount of fluid adjacent to the distal esophagus of uncertain significance. Small hiatal hernia. Scattered small posterior mediastinal lymph nodes probably reactive in nature. Normal heart size. No pericardial effusion. Normal   caliber thoracic aorta. Axillary regions are unremarkable.    CORONARY ARTERY CALCIFICATION: Mild.    HEPATOBILIARY: Liver is negative. Cholelithiasis. No biliary dilatation.    PANCREAS: Normal.    SPLEEN: Normal.    ADRENAL GLANDS: 3 cm right adrenal nodule is technically indeterminate on this study but unchanged compared to 05/24/2022 and compatible with an adrenal adenoma based off an older MRI scan by report. Left adrenal gland is negative.    KIDNEYS/BLADDER: Normal.    BOWEL: Bowel is normal in caliber with no evidence for obstruction. Question some mild edema seen around the second and third portions of the duodenum, nonspecific. Normal appendix. Scattered colonic diverticuli without evidence for diverticulitis. No   acute bowel findings.    LYMPH NODES: Normal.    VASCULATURE: Unremarkable.    PELVIC ORGANS: Normal.    MUSCULOSKELETAL: Mild to moderate  degenerative changes throughout the spine. Right TAWNY. Moderate size fat-containing umbilical hernia.      Impression    IMPRESSION:  1.  Areas of mostly linear scarring and/or atelectasis in the lungs greatest in the left lung base. Nothing definite for acute pneumonitis.    2.  Stable chronic appearing small left pleural effusion.    3.  Small amount of fluid seen adjacent to the distal thoracic esophagus of uncertain significance. Scattered nonspecific posterior mediastinal lymph nodes along the course of the esophagus. These findings are of uncertain significance or etiology.   Correlation for any signs of esophagitis or other inflammatory process.    4.  Cholelithiasis. No biliary dilatation.    5.  Question small amount of edema seen around the second and third portions of the duodenum. Clinical correlation to exclude any signs of a duodenal inflammatory process. Correlation with pancreatic enzymes also suggested to exclude pancreatitis.    6.  3 cm right adrenal nodule is technically indeterminate on this study but unchanged from prior CT scan and reportedly represents a benign adrenal adenoma based off a prior MRI.    7.  Scattered colonic diverticuli without evidence for diverticulitis. Normal appendix.     *Note: Due to a large number of results and/or encounters for the requested time period, some results have not been displayed. A complete set of results can be found in Results Review.

## 2023-01-29 NOTE — PHARMACY-ADMISSION MEDICATION HISTORY
Pharmacy Medication History  Admission medication history interview status for the 1/28/2023  admission is complete. See EPIC admission navigator for prior to admission medications     Location of Interview: Patient room  Medication history sources: Patient and Surescripts    Significant changes made to the medication list:  Removed:  - Atorvastatin (no longer taking)  - Enoxaparin (used as bridge for prior procedure)  - Medrol dose pack (finished)  - Omeprazole (pt reports no longer taking)  - Methotrexate injection (now takes oral)    Changed:  - Abilify (10mg --> now 5mg)  - Cymbalta (60mg --> now 120mg)    In the past week, patient estimated taking medication this percent of the time: greater than 90%    Medication Affordability: Not assessed.       Additional medication history information:   None    Medication reconciliation completed by provider prior to medication history? No    Time spent in this activity: 20 minutes    Prior to Admission medications    Medication Sig Last Dose Taking? Auth Provider Long Term End Date   acetaminophen (TYLENOL) 325 MG tablet Take 2 tablets (650 mg) by mouth every 6 hours as needed for mild pain or fever prn at prn Yes Carli Salas PA     albuterol (PROAIR HFA/PROVENTIL HFA/VENTOLIN HFA) 108 (90 Base) MCG/ACT inhaler Inhale 2 puffs into the lungs every 6 hours as needed for shortness of breath / dyspnea or wheezing prn at prn Yes Indira Pizano MD Yes    ARIPiprazole (ABILIFY) 10 MG tablet Take 1 tablet (10 mg) by mouth daily  Patient taking differently: Take 5 mg by mouth daily 1/28/2023 at am Yes Anjel Busby MD Yes    DULoxetine (CYMBALTA) 60 MG capsule Take 1 capsule (60 mg) by mouth daily  Patient taking differently: Take 120 mg by mouth daily 1/28/2023 at am Yes Carli Salas PA Yes    lisinopril (ZESTRIL) 5 MG tablet Take 1 tablet (5 mg) by mouth daily 1/28/2023 at am Yes Anjel Busby MD Yes    methotrexate 2.5 MG tablet Take 10  "tablets (25 mg) by mouth every 7 days Labs every 8 - 12 weeks for refills.  Patient taking differently: Take 25 mg by mouth every 7 days Labs every 8 - 12 weeks for refills. Takes on Tuesdays. 1/24/2023 Yes Everett Austin MD Yes    traMADol (ULTRAM) 50 MG tablet Take 1 tablet (50 mg) by mouth every 6 hours as needed for severe pain (7-10) prn at prn Yes Anjel Busby MD     VITAMIN D3 25 MCG (1000 UT) tablet TAKE 2 TABLETS BY MOUTH EVERY DAY 1/28/2023 at am Yes Reported, Patient No    warfarin ANTICOAGULANT (COUMADIN) 5 MG tablet TAKE 1 TABLET BY MOUTH DAILY OR AS DIRECTED BY ANTICOAGULATION CLINIC  Patient taking differently: TAKE 1 TABLET BY MOUTH DAILY OR AS DIRECTED BY ANTICOAGULATION CLINIC.  As of 1/28/23: Take 5mg Monday and 2.5mg all other days. 1/28/2023 at am Yes Anjel Busby MD     gabapentin (NEURONTIN) 100 MG capsule Take 1 capsule (100 mg) by mouth 3 times daily   Anjel Busby MD Yes    insulin syringe-needle U-100 (BD INSULIN SYRINGE ULTRAFINE) 30G X 1/2\" 1 ML miscellaneous For Methotrexate use weekly.   Everett Austin MD     methotrexate 50 MG/2ML injection Inject 1 mL (25 mg) Subcutaneous every 7 days   Everett Austin MD Yes    omeprazole (PRILOSEC) 20 MG DR capsule Take 1 capsule (20 mg) by mouth daily   Indira Pizano MD         The information provided in this note is only as accurate as the sources available at the time of update(s)     "

## 2023-01-29 NOTE — H&P
[facility]    History and Physical  Hospitalist       Date of Admission:  1/28/2023  Date of Service (when I saw the patient): 01/29/23    ASSESSMENT  Shira Rodriguez is a markedly pleasant 62 year old woman with past medical history that is most notable for severe morbid obesity, rheumatoid arthritis on methotrexate, and prior massive PE on Warfarin, among others; who presents with unresponsiveness and hypotension with fever, and is found to have suspected severe sepsis with neutropenic fever, acute pancytopenia, and severe KRISS.    PLAN     Severe sepsis with neutropenic fever: Of note, Ms. Rodriguez has a history of rheumatoid arthritis as well as osteoarthritis, and chronic pain. She was hospitalized at [facility] from 9/2019 through 10/2019 for massive PE causing PEA arrest, requiring ECMO. She is now on Warfarin. It seems that had been on methotrexate in the past for RA, and recently a couple of weeks ago she resumed it as advised by her medical team for worsening back and shoulder pain.    She presents now after being found on the floor of her domicile, not well responsive. EMS found severe hypotension en route. In the ED, she has a fever to 100.2, with sinus tachycardia on EKG. She is not hypoxic. She has severe appearing panniculitis on exam under abdominal pannus and breast folds. She has acute pancytopenia with profound neutropenia, as discussed below. She has acute lactic acidosis. She has severe KRISS, as discussed below. CT of chest, abdomen, and pelvis show a small chronic appearing left pleural effusion with small fluid around the distal esophagus and the duodenum. CTH and UA are negative for acute pathology, as are COVID, Influenza and RSV.     Overall, we suspect severe sepsis in the setting of agranulocytosis and neutropenic fever. Source of infection could certainly be severe panniculitis seen on exam. Possible duodenitis noted on CT could be another source. She also has a small  pleural effusion and in review of records this seems to be chronic; she was known in 2019 to have left lower lobe necrotic lesion, and in 10/2022 she underwent thoracentesis that was reportedly negative for infection or malignancy in the fluid. Thus a pulmonary source is also on the differential though perhaps less likely as well, especially without hypoxia or othr acute pulmonary symptoms.      Hypotension and lactic acidosis have ow improved/resolved with IV fluid resuscitation. She currently mentates well now.      -- IMC. NPO. Empiric Cefepime and Vancomycin continued. Follow up blood and urine culture results. 150 ml/hour NS IVF continued. ID consulted for further evaluation. Empiric IV Protonix BID ordered for now. Consider GI consultation pending further clinical course. Tylenol and anti-emetics as needed. Nystatin ordered to the affected skin folds.     Acute neutropenia with pancytopenia: On 12/7/22, CBC showed WBC 10.6, HGB 13.4, . Tonight: WBC 0.4, ANC 0.1, HGB 9.4, PLT 59. This could be due to Methotrexate, other infection or medication, perhaps occult blood malignancy. INR is elevated tonight in the setting of concomitant Warfarin use.    -- Daily CBC with Differential. Hematology consulted for further evaluation. Peripheral smear ordered.    Myonecrosis: Mild, in the absence of chest pain, suspect due to demand ischemia due to sepsis and hypotension. Recurrent PE seems less likely given therapeutic INR tonight.    -- Telemetry, serial enzymes, TTE ordered.     KRISS and acute metabolic acidosis: Superimposed on suspected Stage 4 CKD by prior labs. Suspect due to hypovolemia; ATN also possible. Also noted nephrolithiasis seen on UA, without hydronephrosis on CT. IVF ordered as above. FeNa ordered. Nephrology consulted for further evaluation. Repeat Creatinine in AM.    Recent Labs   Lab Test 01/28/23  2221 12/07/22  1340 12/06/22  1049   CR 3.25* 2.18* 2.11*     Hypomagnesemia: Replacing    3 cm  "right adrenal nodule: Reportedly seen on prior imaging at a similar size. Likely benign. In the setting of hypotension, will check AM Cortisol level; otherwise would plan close outpatient follow up at discharge    COPD: Without signs of acute exacerbation at present. Prn nebs ordered. Resume home albuterol inhaler when verified    Chronic depression and pain: See excellent summary documented at a recent pain clinic visit 1/17/23 for further background information on these.     -- Resume Cymbalta and Neurontin when verified    Severe morbid obesity: Noted  Estimated body mass index is 46 kg/m  as calculated from the following:    Height as of 1/17/23: 1.676 m (5' 6\").    Weight as of 1/17/23: 129.3 kg (285 lb).    I have spent 100 minutes on the date of service doing chart review, history, examination, documentation, and further activities per the note.    Chief Complaint   Weakness    History is obtained from the patient and the ED physician whom I have spoken with    History of Present Illness   Shira Rodriguez is a markedly pleasant 62 year old woman who presents with severe progressive fatigue that has been ongoing for at least the past few months. It is associated with worsening of her chronic back and bilateral shoulder pain, as well as low appetite and low PO intake. She has a history of Rheumatoid Arthritis, and had taken Methotrexate in the past for this, and recently restarted it at a physician's advice about 2-3 weeks ago. She adds that for some time now she has developed severe painful inflamed excoriations of the skin under her breasts and abdominal pannus. She has had spells of acute intermittent pain and weakness in her back and lower legs, leading her to need to sit down. She has been trying to use a walker at home. Over the past week, she has also noticed new onset of intermittent heartburn symptoms. She also has a chronic non-productive cough, for which she has seen a Pulmonologist. Then, this " afternoon, she was at home alone in the kitchen when she felt her back stiffen up more severely than in the past. She says she felt like she could not get her back to straighten out. She became so weak that she had to sit her self down on the ground, and then crawl over to try to get to a phone. She was on the ground for about 4 hours before a friend was able to come and summon 911. When EMS arrived, her BP was 50/palp and a dose of Phenylephrine was given, with immediate improvement in blood pressure. Otherwise, Ms. Rodriguez denies any recent fever, chills, or sweats, or any chest pain or dyspnea, or any nausea, vomiting, or diarrhea, or any melena or hematochezia, or any dysuria or hematuria, or any recent cold symptoms such as runny nose or sore throat, or any inadvertent medication administration, or any thoughts of self harm, or any other acute complaints.    In the ED,   01/28 2217 72/57   100.2 -- 118 24 99 %     VBG showed 7.25/29.    2 Liters IV fluid were given and BP improved to 121/80, pulse to 98.    CBC and CMP were notable for WBC 0.4, HGB 9.4, PLT 59, ANC 0.1, Na 132, CO2 13, BUN 56, Cr 3.25, AG 16, Mag 1.4, alb 2.5, tprot 6.1, otherwise were within the normal reference range. Lactate was 3.2. Lipase was 10. Troponin was 48. EKG showed sinus tachycardia. INR was 1.99. Ethyl alcohol level negative. UA showed 0 WBC, 5 RBC. COVID, Influenza, and RSV were negative. Blood and urine cultures were sent.    Reactive lymphocytes and clumped platelets were noted.    She was given Vancomycin and Cefepime in the ED.    Recent Results (from the past 24 hour(s))   XR Chest Port 1 View    Narrative    EXAM: XR CHEST PORT 1 VIEW  LOCATION: [facility]  DATE/TIME: 1/28/2023 11:57 PM    INDICATION: Sepsis.  COMPARISON: 12/06/2022      Impression    IMPRESSION:     No focal airspace disease. No pleural effusion or pneumothorax.    The cardiomediastinal silhouette is unremarkable. Aortic  calcifications.       PHYSICAL EXAM  Blood pressure 118/81, pulse 100, temperature 100.2  F (37.9  C), temperature source Rectal, resp. rate 28, SpO2 100 %, not currently breastfeeding.  Constitutional: Alert and oriented to person, place and time now; no apparent distress; severe morbid obesity  HEENT: very dry mucus membranes  Respiratory: lungs diminished to auscultation bilaterally  Cardiovascular: regular S1 S2  GI: abdomen soft non tender non distended bowel sounds positive though faint  Skin: extensive erythematous weeping excoriations over abdominal fat pannus and bilateral breast skin folds (right worse than left); exam conducted with female nurse chaperone  Musculoskeletal: severe bilateral LE edema     DVT Prophylaxis: Warfarin  Code Status: Full Code    Disposition: Expected discharge in several days given severe acute illness with need for ongoing IV antibiotics and IV fluid    Gianluca Mera MD, MD    Past Medical History    I have reviewed this patient's medical history and updated it with pertinent information if needed.   Past Medical History:   Diagnosis Date     Chronic bronchitis (H) 07/30/2015     Contact dermatitis      Depressive disorder 1985     Eczema     HANDS     Elevated liver enzymes      H/O total knee replacement, left      History of total hip replacement 10/27/2011     Hyperlipidemia      Hypertension      Morbid obesity (H)      Osteoarthrosis     right knee     RA (rheumatoid arthritis) (H) 8/2018     Sleep apnea      Tobacco use disorder      Varicella 1965       Past Surgical History   I have reviewed this patient's surgical history and updated it with pertinent information if needed.  Past Surgical History:   Procedure Laterality Date     ARTHROPLASTY KNEE  6/14/2012    Procedure: ARTHROPLASTY KNEE;  Left Total Knee Arthroplasty;  Surgeon: Annette Quesada MD;  Location: [facility] OR     ARTHROSCOPY HIP Right      BRONCHOSCOPY FLEXIBLE AND RIGID N/A 9/17/2019    Procedure:  Bronchoscopy flexible;  Surgeon: Patrick Rayo MD;  Location: [facility] OR     COLONOSCOPY N/A 7/16/2021    Procedure: COLONOSCOPY, WITH POLYPECTOMY AND BIOPSY;  Surgeon: Diogo Lynch MD;  Location: [facility] GI     CV EXTRACORPERAL MEMBRANE OXYGENATION N/A 9/9/2019    Procedure: Extracorporeal Membrance Oxygenation;  Surgeon: Kaleb Mcfarlane MD;  Location: [facility]     INSERT EXTRACORPORAL MEMBRANE OXYGENATOR N/A 9/17/2019    Procedure: Venous-Venous cannulation using ultrasound guidance and transesophageal echocardiogram, venous-arterial ecmo decannulation and repair of left femoral artery;  Surgeon: Patrick Rayo MD;  Location: [facility] OR     IR MECH THROMB PRIM ART, NON-INTRACRNL  9/10/2019     IR PULMONARY ANGIOGRAM BILATERAL  9/10/2019     IR THROMBOLYSIS ARTERIAL INFUSION INITIAL DAY  9/10/2019     THORACENTESIS N/A 1/16/2020    Procedure: THORACENTESIS;  Surgeon: Georgina Richardson MD;  Location:  GI     ZZC TOTAL HIP ARTHROPLASTY  07/09/04    RT       Prior to Admission Medications   Prior to Admission Medications   Prescriptions Last Dose Informant Patient Reported? Taking?   ARIPiprazole (ABILIFY) 10 MG tablet 1/28/2023 at am Self No Yes   Sig: Take 1 tablet (10 mg) by mouth daily   Patient taking differently: Take 5 mg by mouth daily   DULoxetine (CYMBALTA) 60 MG capsule 1/28/2023 at am Self No Yes   Sig: Take 1 capsule (60 mg) by mouth daily   Patient taking differently: Take 120 mg by mouth daily   VITAMIN D3 25 MCG (1000 UT) tablet 1/28/2023 at am Self Yes Yes   Sig: TAKE 2 TABLETS BY MOUTH EVERY DAY   acetaminophen (TYLENOL) 325 MG tablet prn at prn Self No Yes   Sig: Take 2 tablets (650 mg) by mouth every 6 hours as needed for mild pain or fever   albuterol (PROAIR HFA/PROVENTIL HFA/VENTOLIN HFA) 108 (90 Base) MCG/ACT inhaler prn at prn Self No Yes   Sig: Inhale 2 puffs into the lungs every 6 hours as needed for shortness of breath / dyspnea or wheezing   gabapentin  "(NEURONTIN) 100 MG capsule  Self No No   Sig: Take 1 capsule (100 mg) by mouth 3 times daily   insulin syringe-needle U-100 (BD INSULIN SYRINGE ULTRAFINE) 30G X 1/2\" 1 ML miscellaneous   No No   Sig: For Methotrexate use weekly.   lisinopril (ZESTRIL) 5 MG tablet 1/28/2023 at am Self No Yes   Sig: Take 1 tablet (5 mg) by mouth daily   methotrexate 2.5 MG tablet 1/24/2023 Self No Yes   Sig: Take 10 tablets (25 mg) by mouth every 7 days Labs every 8 - 12 weeks for refills.   Patient taking differently: Take 25 mg by mouth every 7 days Labs every 8 - 12 weeks for refills. Takes on Tuesdays.   methotrexate 50 MG/2ML injection  Self No No   Sig: Inject 1 mL (25 mg) Subcutaneous every 7 days   omeprazole (PRILOSEC) 20 MG DR capsule  Self No No   Sig: Take 1 capsule (20 mg) by mouth daily   traMADol (ULTRAM) 50 MG tablet prn at prn Self No Yes   Sig: Take 1 tablet (50 mg) by mouth every 6 hours as needed for severe pain (7-10)   warfarin ANTICOAGULANT (COUMADIN) 5 MG tablet 1/28/2023 at am Self No Yes   Sig: TAKE 1 TABLET BY MOUTH DAILY OR AS DIRECTED BY ANTICOAGULATION CLINIC   Patient taking differently: TAKE 1 TABLET BY MOUTH DAILY OR AS DIRECTED BY ANTICOAGULATION CLINIC.  As of 1/28/23: Take 5mg Monday and 2.5mg all other days.      Facility-Administered Medications Last Administration Doses Remaining   lidocaine (PF) (XYLOCAINE) 1 % injection 2 mL 11/28/2022  9:23 AM    lidocaine (PF) (XYLOCAINE) 1 % injection 3 mL 11/28/2022  9:23 AM    lidocaine (PF) (XYLOCAINE) 1 % injection 4 mL 11/28/2022  9:23 AM    triamcinolone (KENALOG-40) injection 40 mg 11/28/2022  9:23 AM    triamcinolone (KENALOG-40) injection 40 mg 11/28/2022  9:23 AM         Allergies   Allergies   Allergen Reactions     Adhesive Tape Blisters     Erythromycin Rash       Social History   I have reviewed this patient's social history and updated it with pertinent information if needed. Shira Rodriguez  reports that she quit smoking about 3 years ago. " Her smoking use included cigarettes. She started smoking about 41 years ago. She has never used smokeless tobacco. She reports current alcohol use. She reports that she does not use drugs.    Family History   Family history assessed and, except as above, is non-contributory.    Family History   Problem Relation Age of Onset     Thyroid Disease Mother      Hypertension Mother      Skin Cancer Mother         MMohs surgery with skin graft     Cerebrovascular Disease Mother         CVA after endarderectomy-      Diabetes Mother      Depression Mother      Alcoholism Father      Heart Disease Father      Substance Abuse Father              Heart Disease Sister         multiple ablations     Alcoholism Sister      Substance Abuse Sister         Sober     Depression Sister      Substance Abuse Sister         Sober 30 plus years     Depression Sister      Thyroid Disease Sister      Hypertension Maternal Grandmother      Depression Maternal Grandmother      Breast Cancer Paternal Grandmother      Pancreatic Cancer Paternal Grandmother      Prostate Cancer Paternal Grandfather      Cardiovascular Paternal Aunt      Cardiovascular Paternal Uncle      Depression Cousin      Depression Other        Review of Systems   The 10 point Review of Systems is negative other than noted in the HPI or here.     Primary Care Physician   Anjel Busby    Data   Labs Ordered and Resulted from Time of ED Arrival to Time of ED Departure   ROUTINE UA WITH MICROSCOPIC REFLEX TO CULTURE - Abnormal       Result Value    Color Urine Orange (*)     Appearance Urine Slightly Cloudy (*)     Glucose Urine Negative      Bilirubin Urine Negative      Ketones Urine Negative      Specific Gravity Urine 1.012      Blood Urine Moderate (*)     pH Urine 5.0      Protein Albumin Urine 30 (*)     Urobilinogen Urine Normal      Nitrite Urine Negative      Leukocyte Esterase Urine Negative      Mucus Urine Present (*)     Amorphous  Crystals Urine Few (*)     RBC Urine 5 (*)     WBC Urine 0      Squamous Epithelials Urine 3 (*)     Hyaline Casts Urine 3 (*)    ISTAT GASES LACTATE VENOUS POCT - Abnormal    Lactic Acid POCT 3.2 (*)     Bicarbonate Venous POCT 13 (*)     O2 Sat, Venous POCT 42 (*)     pCO2V Venous POCT 29 (*)     pH Venous POCT 7.25 (*)     pO2 Venous POCT 27     COMPREHENSIVE METABOLIC PANEL - Abnormal    Sodium 132 (*)     Potassium 5.2      Chloride 103      Carbon Dioxide (CO2) 13 (*)     Anion Gap 16 (*)     Urea Nitrogen 55.5 (*)     Creatinine 3.25 (*)     Calcium 8.9      Glucose 84      Alkaline Phosphatase 57      AST 28      ALT 13      Protein Total 6.1 (*)     Albumin 2.5 (*)     Bilirubin Total 0.7      GFR Estimate 16 (*)    INR - Abnormal    INR 1.99 (*)    MAGNESIUM - Abnormal    Magnesium 1.4 (*)    TROPONIN T, HIGH SENSITIVITY - Abnormal    Troponin T, High Sensitivity 48 (*)    CBC WITH PLATELETS AND DIFFERENTIAL - Abnormal    WBC Count 0.4 (*)     RBC Count 3.30 (*)     Hemoglobin 9.4 (*)     Hematocrit 29.7 (*)     MCV 90      MCH 28.5      MCHC 31.6      RDW 13.6      Platelet Count 59 (*)    CK TOTAL - Abnormal     (*)    DIFFERENTIAL - Abnormal    % Neutrophils 24      % Lymphocytes 72      % Monocytes 3      % Eosinophils 1      % Basophils 0      Absolute Neutrophils 0.1 (*)     Absolute Lymphocytes 0.3 (*)     Absolute Monocytes 0.0      Absolute Eosinophils 0.0      Absolute Basophils 0.0      RBC Morphology Confirmed RBC Indices      Platelet Assessment Platelets Clumped (*)     Reactive Lymphocytes Present (*)    ETHYL ALCOHOL LEVEL - Normal    Alcohol ethyl <0.01     LACTIC ACID WHOLE BLOOD - Normal    Lactic Acid 2.0     INFLUENZA A/B & SARS-COV2 PCR MULTIPLEX   LACTIC ACID WHOLE BLOOD   TYPE AND SCREEN, ADULT    ABO/RH(D) A POS      Antibody Screen Negative      SPECIMEN EXPIRATION DATE [id number]     URINE CULTURE   BLOOD CULTURE   BLOOD CULTURE   ABO/RH TYPE AND SCREEN       Data  reviewed today:  I personally reviewed the EKG tracing showing sinus tachycardia, the chest CT image(s) showing small left pleural effusion, the abdominal CT image(s) showing possible duodenitis and the head CT image(s) showing no acute pathology.

## 2023-01-29 NOTE — ED NOTES
DATE:  1/28/2023   TIME OF RECEIPT FROM LAB:  3235  LAB TEST:  WBC: 0.4 abs neutrophils: 0.1    RESULTS GIVEN WITH READ-BACK TO (PROVIDER): Dr. Khan    TIME LAB VALUE REPORTED TO PROVIDER:   4058

## 2023-01-30 ENCOUNTER — APPOINTMENT (OUTPATIENT)
Dept: CARDIOLOGY | Facility: CLINIC | Age: 63
DRG: 871 | End: 2023-01-30
Attending: HOSPITALIST
Payer: COMMERCIAL

## 2023-01-30 LAB
ALBUMIN SERPL ELPH-MCNC: 1.7 G/DL (ref 3.7–5.1)
ALPHA1 GLOB SERPL ELPH-MCNC: 0.7 G/DL (ref 0.2–0.4)
ALPHA2 GLOB SERPL ELPH-MCNC: 0.8 G/DL (ref 0.5–0.9)
ANION GAP SERPL CALCULATED.3IONS-SCNC: 12 MMOL/L (ref 7–15)
B-GLOBULIN SERPL ELPH-MCNC: 0.6 G/DL (ref 0.6–1)
BLD PROD TYP BPU: NORMAL
BLOOD COMPONENT TYPE: NORMAL
BUN SERPL-MCNC: 47.6 MG/DL (ref 8–23)
BURR CELLS BLD QL SMEAR: SLIGHT
CALCIUM SERPL-MCNC: 8.2 MG/DL (ref 8.8–10.2)
CHLORIDE SERPL-SCNC: 112 MMOL/L (ref 98–107)
CHLORIDE UR-SCNC: 65 MMOL/L
CMV DNA SPEC NAA+PROBE-ACNC: NOT DETECTED IU/ML
CMV IGG SERPL IA-ACNC: <0.2 U/ML
CMV IGG SERPL IA-ACNC: NORMAL
CMV IGM SERPL IA-ACNC: <8 AU/ML
CMV IGM SERPL IA-ACNC: NEGATIVE
CODING SYSTEM: NORMAL
CREAT SERPL-MCNC: 2.65 MG/DL (ref 0.51–0.95)
DEPRECATED CALCIDIOL+CALCIFEROL SERPL-MC: 24 UG/L (ref 20–75)
DEPRECATED HCO3 PLAS-SCNC: 14 MMOL/L (ref 22–29)
EBV DNA # SPEC NAA+PROBE: NOT DETECTED COPIES/ML
EBV EA-D IGG SER-ACNC: 15 U/ML (ref 0–9)
EBV EA-D IGG SER-ACNC: POSITIVE
EBV NA IGG SER IA-ACNC: >600 U/ML
EBV NA IGG SER IA-ACNC: POSITIVE [IU]/ML
EBV VCA IGG SER IA-ACNC: 210 U/ML
EBV VCA IGG SER IA-ACNC: POSITIVE
EBV VCA IGM SER IA-ACNC: <10 U/ML
EBV VCA IGM SER IA-ACNC: NORMAL
ELLIPTOCYTES BLD QL SMEAR: SLIGHT
ERYTHROCYTE [DISTWIDTH] IN BLOOD BY AUTOMATED COUNT: 14 % (ref 10–15)
GAMMA GLOB SERPL ELPH-MCNC: 0.5 G/DL (ref 0.7–1.6)
GFR SERPL CREATININE-BSD FRML MDRD: 20 ML/MIN/1.73M2
GLUCOSE SERPL-MCNC: 82 MG/DL (ref 70–99)
HBV SURFACE AG SERPL QL IA: NONREACTIVE
HCT VFR BLD AUTO: 24 % (ref 35–47)
HCV AB SERPL QL IA: NONREACTIVE
HGB BLD-MCNC: 7.8 G/DL (ref 11.7–15.7)
HIV 1+2 AB+HIV1 P24 AG SERPL QL IA: NONREACTIVE
HSV1 DNA SPEC QL NAA+PROBE: NEGATIVE
HSV2 DNA SPEC QL NAA+PROBE: NEGATIVE
IGA SERPL-MCNC: 225 MG/DL (ref 84–499)
IGG SERPL-MCNC: 626 MG/DL (ref 610–1616)
IGM SERPL-MCNC: 151 MG/DL (ref 35–242)
INR PPP: 2.8 (ref 0.85–1.15)
ISSUE DATE AND TIME: NORMAL
KAPPA LC FREE SER-MCNC: 7.09 MG/DL (ref 0.33–1.94)
KAPPA LC FREE/LAMBDA FREE SER NEPH: 1.24 {RATIO} (ref 0.26–1.65)
LACTATE SERPL-SCNC: 0.4 MMOL/L (ref 0.7–2)
LACTATE SERPL-SCNC: 0.6 MMOL/L (ref 0.7–2)
LAMBDA LC FREE SERPL-MCNC: 5.74 MG/DL (ref 0.57–2.63)
LVEF ECHO: NORMAL
M PROTEIN SERPL ELPH-MCNC: 0.1 G/DL
MAGNESIUM SERPL-MCNC: 1.5 MG/DL (ref 1.7–2.3)
MAGNESIUM SERPL-MCNC: 2.4 MG/DL (ref 1.7–2.3)
MCH RBC QN AUTO: 28.7 PG (ref 26.5–33)
MCHC RBC AUTO-ENTMCNC: 32.5 G/DL (ref 31.5–36.5)
MCV RBC AUTO: 88 FL (ref 78–100)
MTX SERPL-SCNC: <0.04 UMOL/L
PATH REPORT.COMMENTS IMP SPEC: NORMAL
PATH REPORT.FINAL DX SPEC: NORMAL
PATH REPORT.MICROSCOPIC SPEC OTHER STN: NORMAL
PATH REPORT.MICROSCOPIC SPEC OTHER STN: NORMAL
PLAT MORPH BLD: ABNORMAL
PLATELET # BLD AUTO: 20 10E3/UL (ref 150–450)
POTASSIUM SERPL-SCNC: 3.6 MMOL/L (ref 3.4–5.3)
POTASSIUM UR-SCNC: 23 MMOL/L
PROT PATTERN SERPL ELPH-IMP: ABNORMAL
PROT PATTERN SERPL IFE-IMP: NORMAL
PTH-INTACT SERPL-MCNC: 100 PG/ML (ref 15–65)
RBC # BLD AUTO: 2.72 10E6/UL (ref 3.8–5.2)
RBC MORPH BLD: ABNORMAL
SODIUM SERPL-SCNC: 138 MMOL/L (ref 136–145)
UNIT ABO/RH: NORMAL
UNIT NUMBER: NORMAL
UNIT STATUS: NORMAL
UNIT TYPE ISBT: 6200
WBC # BLD AUTO: 0.2 10E3/UL (ref 4–11)

## 2023-01-30 PROCEDURE — 250N000013 HC RX MED GY IP 250 OP 250 PS 637: Performed by: HOSPITALIST

## 2023-01-30 PROCEDURE — 82570 ASSAY OF URINE CREATININE: CPT | Performed by: HOSPITALIST

## 2023-01-30 PROCEDURE — 250N000011 HC RX IP 250 OP 636: Performed by: HOSPITALIST

## 2023-01-30 PROCEDURE — 86160 COMPLEMENT ANTIGEN: CPT | Performed by: INTERNAL MEDICINE

## 2023-01-30 PROCEDURE — 258N000002 HC RX IP 258 OP 250: Performed by: INTERNAL MEDICINE

## 2023-01-30 PROCEDURE — 36415 COLL VENOUS BLD VENIPUNCTURE: CPT | Performed by: HOSPITALIST

## 2023-01-30 PROCEDURE — G0463 HOSPITAL OUTPT CLINIC VISIT: HCPCS

## 2023-01-30 PROCEDURE — 250N000011 HC RX IP 250 OP 636: Performed by: INTERNAL MEDICINE

## 2023-01-30 PROCEDURE — 86256 FLUORESCENT ANTIBODY TITER: CPT | Performed by: INTERNAL MEDICINE

## 2023-01-30 PROCEDURE — 120N000001 HC R&B MED SURG/OB

## 2023-01-30 PROCEDURE — 87340 HEPATITIS B SURFACE AG IA: CPT | Performed by: INTERNAL MEDICINE

## 2023-01-30 PROCEDURE — 250N000013 HC RX MED GY IP 250 OP 250 PS 637: Performed by: SPECIALIST

## 2023-01-30 PROCEDURE — 86225 DNA ANTIBODY NATIVE: CPT | Performed by: INTERNAL MEDICINE

## 2023-01-30 PROCEDURE — 83605 ASSAY OF LACTIC ACID: CPT | Performed by: HOSPITALIST

## 2023-01-30 PROCEDURE — 87449 NOS EACH ORGANISM AG IA: CPT | Performed by: HOSPITALIST

## 2023-01-30 PROCEDURE — 85060 BLOOD SMEAR INTERPRETATION: CPT | Performed by: PATHOLOGY

## 2023-01-30 PROCEDURE — P9037 PLATE PHERES LEUKOREDU IRRAD: HCPCS | Performed by: HOSPITALIST

## 2023-01-30 PROCEDURE — 99233 SBSQ HOSP IP/OBS HIGH 50: CPT | Performed by: SPECIALIST

## 2023-01-30 PROCEDURE — 80204 DRUG ASSAY METHOTREXATE: CPT | Performed by: INTERNAL MEDICINE

## 2023-01-30 PROCEDURE — 85027 COMPLETE CBC AUTOMATED: CPT | Performed by: HOSPITALIST

## 2023-01-30 PROCEDURE — 86706 HEP B SURFACE ANTIBODY: CPT | Performed by: INTERNAL MEDICINE

## 2023-01-30 PROCEDURE — 99233 SBSQ HOSP IP/OBS HIGH 50: CPT | Performed by: HOSPITALIST

## 2023-01-30 PROCEDURE — 99223 1ST HOSP IP/OBS HIGH 75: CPT | Performed by: INTERNAL MEDICINE

## 2023-01-30 PROCEDURE — 250N000013 HC RX MED GY IP 250 OP 250 PS 637: Performed by: INTERNAL MEDICINE

## 2023-01-30 PROCEDURE — 82306 VITAMIN D 25 HYDROXY: CPT | Performed by: INTERNAL MEDICINE

## 2023-01-30 PROCEDURE — 36415 COLL VENOUS BLD VENIPUNCTURE: CPT | Performed by: INTERNAL MEDICINE

## 2023-01-30 PROCEDURE — 250N000009 HC RX 250: Performed by: INTERNAL MEDICINE

## 2023-01-30 PROCEDURE — 93306 TTE W/DOPPLER COMPLETE: CPT | Mod: 26 | Performed by: INTERNAL MEDICINE

## 2023-01-30 PROCEDURE — 999N000208 ECHOCARDIOGRAM COMPLETE

## 2023-01-30 PROCEDURE — 83970 ASSAY OF PARATHORMONE: CPT | Performed by: INTERNAL MEDICINE

## 2023-01-30 PROCEDURE — 86803 HEPATITIS C AB TEST: CPT | Performed by: INTERNAL MEDICINE

## 2023-01-30 PROCEDURE — 80048 BASIC METABOLIC PNL TOTAL CA: CPT | Performed by: INTERNAL MEDICINE

## 2023-01-30 PROCEDURE — 250N000013 HC RX MED GY IP 250 OP 250 PS 637: Performed by: STUDENT IN AN ORGANIZED HEALTH CARE EDUCATION/TRAINING PROGRAM

## 2023-01-30 PROCEDURE — 99233 SBSQ HOSP IP/OBS HIGH 50: CPT | Performed by: INTERNAL MEDICINE

## 2023-01-30 PROCEDURE — 85610 PROTHROMBIN TIME: CPT | Performed by: HOSPITALIST

## 2023-01-30 PROCEDURE — C9113 INJ PANTOPRAZOLE SODIUM, VIA: HCPCS | Performed by: HOSPITALIST

## 2023-01-30 PROCEDURE — 99232 SBSQ HOSP IP/OBS MODERATE 35: CPT | Performed by: INTERNAL MEDICINE

## 2023-01-30 PROCEDURE — 83735 ASSAY OF MAGNESIUM: CPT | Performed by: HOSPITALIST

## 2023-01-30 PROCEDURE — 82436 ASSAY OF URINE CHLORIDE: CPT | Performed by: INTERNAL MEDICINE

## 2023-01-30 PROCEDURE — 84133 ASSAY OF URINE POTASSIUM: CPT | Performed by: INTERNAL MEDICINE

## 2023-01-30 PROCEDURE — 255N000002 HC RX 255 OP 636: Performed by: HOSPITALIST

## 2023-01-30 RX ORDER — FOLIC ACID 1 MG/1
5 TABLET ORAL ONCE
Status: COMPLETED | OUTPATIENT
Start: 2023-01-30 | End: 2023-01-30

## 2023-01-30 RX ORDER — NALOXONE HYDROCHLORIDE 0.4 MG/ML
0.2 INJECTION, SOLUTION INTRAMUSCULAR; INTRAVENOUS; SUBCUTANEOUS
Status: DISCONTINUED | OUTPATIENT
Start: 2023-01-30 | End: 2023-02-21 | Stop reason: HOSPADM

## 2023-01-30 RX ORDER — WARFARIN SODIUM 1 MG/1
1 TABLET ORAL
Status: DISCONTINUED | OUTPATIENT
Start: 2023-01-30 | End: 2023-01-30

## 2023-01-30 RX ORDER — NALOXONE HYDROCHLORIDE 0.4 MG/ML
0.4 INJECTION, SOLUTION INTRAMUSCULAR; INTRAVENOUS; SUBCUTANEOUS
Status: DISCONTINUED | OUTPATIENT
Start: 2023-01-30 | End: 2023-02-21 | Stop reason: HOSPADM

## 2023-01-30 RX ORDER — FLUCONAZOLE 100 MG/1
100 TABLET ORAL DAILY
Status: DISCONTINUED | OUTPATIENT
Start: 2023-01-30 | End: 2023-02-03

## 2023-01-30 RX ORDER — MAGNESIUM SULFATE HEPTAHYDRATE 40 MG/ML
4 INJECTION, SOLUTION INTRAVENOUS ONCE
Status: COMPLETED | OUTPATIENT
Start: 2023-01-30 | End: 2023-01-30

## 2023-01-30 RX ORDER — FOLIC ACID 1 MG/1
2 TABLET ORAL DAILY
Status: DISCONTINUED | OUTPATIENT
Start: 2023-01-31 | End: 2023-02-21 | Stop reason: HOSPADM

## 2023-01-30 RX ORDER — DOXYCYCLINE 100 MG/1
100 CAPSULE ORAL EVERY 12 HOURS SCHEDULED
Status: DISCONTINUED | OUTPATIENT
Start: 2023-01-30 | End: 2023-02-01

## 2023-01-30 RX ORDER — FOLIC ACID 1 MG/1
1 TABLET ORAL DAILY
Status: DISCONTINUED | OUTPATIENT
Start: 2023-01-30 | End: 2023-01-31

## 2023-01-30 RX ADMIN — MICONAZOLE NITRATE: 2 POWDER TOPICAL at 23:53

## 2023-01-30 RX ADMIN — FOLIC ACID 1 MG: 1 TABLET ORAL at 11:55

## 2023-01-30 RX ADMIN — SODIUM BICARBONATE: 84 INJECTION, SOLUTION INTRAVENOUS at 06:10

## 2023-01-30 RX ADMIN — PANTOPRAZOLE SODIUM 40 MG: 40 INJECTION, POWDER, FOR SOLUTION INTRAVENOUS at 18:57

## 2023-01-30 RX ADMIN — BENZOCAINE 6 MG-MENTHOL 10 MG LOZENGES 1 LOZENGE: at 20:21

## 2023-01-30 RX ADMIN — ARIPIPRAZOLE 5 MG: 5 TABLET ORAL at 07:59

## 2023-01-30 RX ADMIN — GABAPENTIN 100 MG: 100 CAPSULE ORAL at 07:58

## 2023-01-30 RX ADMIN — FLUCONAZOLE 100 MG: 100 TABLET ORAL at 11:55

## 2023-01-30 RX ADMIN — GABAPENTIN 100 MG: 100 CAPSULE ORAL at 13:05

## 2023-01-30 RX ADMIN — PANTOPRAZOLE SODIUM 40 MG: 40 INJECTION, POWDER, FOR SOLUTION INTRAVENOUS at 06:10

## 2023-01-30 RX ADMIN — FILGRASTIM 480 MCG: 480 INJECTION, SOLUTION INTRAVENOUS; SUBCUTANEOUS at 20:21

## 2023-01-30 RX ADMIN — DULOXETINE HYDROCHLORIDE 120 MG: 60 CAPSULE, DELAYED RELEASE ORAL at 07:59

## 2023-01-30 RX ADMIN — CYANOCOBALAMIN 1000 MCG: 1000 INJECTION, SOLUTION INTRAMUSCULAR at 07:59

## 2023-01-30 RX ADMIN — GABAPENTIN 100 MG: 100 CAPSULE ORAL at 20:21

## 2023-01-30 RX ADMIN — DOXYCYCLINE 100 MG: 100 INJECTION, POWDER, LYOPHILIZED, FOR SOLUTION INTRAVENOUS at 04:14

## 2023-01-30 RX ADMIN — SODIUM BICARBONATE: 84 INJECTION, SOLUTION INTRAVENOUS at 16:57

## 2023-01-30 RX ADMIN — MAGNESIUM SULFATE HEPTAHYDRATE 4 G: 40 INJECTION, SOLUTION INTRAVENOUS at 16:57

## 2023-01-30 RX ADMIN — CEFEPIME HYDROCHLORIDE 2 G: 2 INJECTION, POWDER, FOR SOLUTION INTRAVENOUS at 11:56

## 2023-01-30 RX ADMIN — ACETAMINOPHEN 650 MG: 325 TABLET, FILM COATED ORAL at 07:58

## 2023-01-30 RX ADMIN — CEFEPIME HYDROCHLORIDE 2 G: 2 INJECTION, POWDER, FOR SOLUTION INTRAVENOUS at 23:50

## 2023-01-30 RX ADMIN — DOXYCYCLINE HYCLATE 100 MG: 100 CAPSULE ORAL at 18:57

## 2023-01-30 RX ADMIN — HUMAN ALBUMIN MICROSPHERES AND PERFLUTREN 9 ML: 10; .22 INJECTION, SOLUTION INTRAVENOUS at 11:12

## 2023-01-30 RX ADMIN — NYSTATIN: 100000 CREAM TOPICAL at 07:59

## 2023-01-30 RX ADMIN — FOLIC ACID 5 MG: 1 TABLET ORAL at 12:07

## 2023-01-30 RX ADMIN — OXYCODONE HYDROCHLORIDE 2.5 MG: 5 TABLET ORAL at 21:45

## 2023-01-30 ASSESSMENT — ACTIVITIES OF DAILY LIVING (ADL)
ADLS_ACUITY_SCORE: 53
ADLS_ACUITY_SCORE: 37
ADLS_ACUITY_SCORE: 54
ADLS_ACUITY_SCORE: 53
ADLS_ACUITY_SCORE: 38
ADLS_ACUITY_SCORE: 54
ADLS_ACUITY_SCORE: 38
ADLS_ACUITY_SCORE: 53
ADLS_ACUITY_SCORE: 38
ADLS_ACUITY_SCORE: 38
ADLS_ACUITY_SCORE: 50
ADLS_ACUITY_SCORE: 54

## 2023-01-30 NOTE — CONSULTS
Consult Date: 01/30/2023    REASON FOR CONSULTATION:  Abnormal echodensities in the aortic valve/aortic root concerning for possible vegetation versus artifact.    HISTORY OF PRESENT ILLNESS:  The patient is a very pleasant 62-year-old female with morbid obesity, history of PEA arrest due to massive PE requiring ECMO on chronic warfarin, acute on chronic kidney disease and rheumatoid arthritis on methotrexate who was admitted to the hospital with progressive fatigue and weakness.  The patient was found down at home by her sister Saturday night.  She was apparently on the floor for 4 hours prior to family arriving.  She was conscious but in distress with shortness of breath.  When EMS arrived, the patient was a febrile and quite hypotensive.  The patient was subsequently brought to the hospital, where she was found to be neutropenic.  She is now diagnosed with pancytopenia of unknown origin and sepsis with neutropenic fever.    The patient had a transthoracic echocardiogram as part of a workup.  I personally reviewed the echocardiogram.  Her LV function is preserved.  The RV is mildly dilated with some mild decreased function.  The echo showed a possible mobile mass on the aortic root at the sinotubular junction, concerning for possible vegetation versus artifact.  The echo was technically difficult.  The patient had CT of the chest yesterday.  This was a noncontrast CT.  However,on my review of the CT, there is no obvious aortic dissection.  The patient reports no cardiopulmonary symptoms, specifically no recent chest pain or back pain.    ALLERGIES:  ADHESIVE TAPE AND AZITHROMYCIN.    CURRENT MEDICATIONS:    1.  Cefepime 2 grams IV every 12 hours.    2.  Abilify 5 mg daily.  3.  Doxycycline 100 mg twice daily.  4.  Cymbalta 120 mg daily.  5.  Neupogen 400 mcg daily.  6.  Fluconazole 100 mg daily.  7.  Gabapentin 100 mg 3 times daily.  8.  Protonix 40 mg IV twice daily.  9.  Warfarin 5 mg daily.    PAST MEDICAL  HISTORY:    1.  History of massive PE, on chronic warfarin.  2.  Chronic kidney disease, stage IV.  3.  Rheumatoid arthritis.  4.  Type 2 diabetes.  5.  Morbid obesity.  6.  COPD.  7.  Depression.    SOCIAL HISTORY:  She does not smoke.  She denies excessive alcohol use.    FAMILY HISTORY:  Reviewed and noncontributory to this admission.    REVIEW OF SYSTEMS:  Comprehensive review of systems was performed and essentially negative except as mentioned in HPI.    PHYSICAL EXAMINATION:    VITAL SIGNS:  Blood pressure is 100/57.  Pulse is 100 and regular.  GENERAL:  She is resting, appears to be in no acute distress.  NECK:  Jugular pressure is difficult to assess due to patient's body habitus.  LUNGS:  Slightly diminished breath sounds at the bases, otherwise clear.  HEART:  Regular rhythm.  Faint 1/6 systolic ejection murmur.  ABDOMEN:  Obese, soft.  EXTREMITIES:  Warm, erythematous changes on the shins.  No significant edema.  Vessels 2+ left radial, 2+ DP bilaterally.  NEUROLOGIC:  No focal motor deficits.  HEENT:  Oropharynx clear.  Mucous membranes are moist.  PSYCHIATRIC:  Alert and oriented x3.  Normal mood.    ASSESSMENT AND PLAN:  The patient is a very pleasant 62-year-old female who was admitted with severe sepsis and pancytopenia of unknown cause.  The patient had a transthoracic echocardiogram earlier this morning, which I reviewed.  As noted above, the echo shows mobile echodensity in the aortic root area.  The mass is calcified, and it likely represents mobile plaque.  However, given her clinical presentation, vegetation could also be a possibility. I do not think this represents aortic dissection.  Of note, the patient does not have any signs or symptoms concerning for dissection.  Her pulses are symmetric bilaterally, and the CT of the chest, although it was noncontrast, did not show any obvious evidence of dissection.    Given her clinical presentation, I would recommend a transesophageal echocardiogram  to further assess the cause of this finding.  Unfortunately, the patient had lunch today.  Therefore, we will plan for the LETA tomorrow.  We will keep her n.p.o. after midnight.  She will likely benefit from statin therapy, and we will initiate that after the LETA.    I appreciate the opportunity to participate in her care.     Reno Ness MD        D: 2023   T: 2023   MT: ALONSO    Name:     XAVIER ROWELLKarla  MRN:      [id number]        Account:      [id number]   :      1960           Consult Date: 2023     Document: [id number]

## 2023-01-30 NOTE — PROVIDER NOTIFICATION
MD Notification    Notified Person: MD    Notified Person Name: Ese    Notification Date/Time: 01/29/23 7:30 PM     Notification Interaction: amcom    Purpose of Notification: FYI critical lab of [address]    Orders Received:    Comments:

## 2023-01-30 NOTE — PROGRESS NOTES
Nephrology Progress Note  01/30/2023         Assessment & Recommendations:   61-year-old female with rheumatoid arthritis, recently started on methotrexate -admitted with severe pancytopenia, neutropenic fever, intertrigo, severe sepsis, KRISS    1 acute renal failure-normal creatinine as of 2021.  Creatinine was up to 1.7 in May 2022, and more recently around 2.1.  Unclear cause of rising creatinine in 2022.  Attributed to dehydration but never really went down.  -Now admitted with a creatinine of 3.2 in setting of sepsis, hypotension, pancytopenia, poor oral intake, use of lisinopril.  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  Urinalysis with multiple hyaline casts and amorphous crystals consistent with prerenal state.  Relatively bland with 5 RBCs per high-power field, 1+ protein and no casts.  -CT abdomen with normal-looking kidneys.  Excellent urine output.    2 pancytopenia-hematology on board.  Noted to have severe vitamin B12 deficiency and folate deficiency.  May need bone marrow biopsy.  Several labs are pending.  Methotrexate on hold.    3 neutropenic fever-no apparent source of infection.  On cefepime and doxycycline.  -Infectious work-up underway.    4 intertrigo-on fluconazole.    5 metabolic acidosis-with renal failure.  Improving on sodium bicarbonate.  Urine pH appropriate.    Discussion/recommendation-  Encouraged to see improving renal function and good urine output.  Continue to hold lisinopril.  Continue IV fluid at current rate.  Daily renal panel.        Sarahi Kelley MD  [facility] - Nephrology   [phone]      Interval History :   Seen / examined.   No acute issues overnight.  Breathing okay.  Fatigue.  Good urine output.  Creatinine slightly down.    Review of Systems:   A 4 point review of systems was negative except as noted above.  Notably: poor appetite. no nausea or vomiting or diarrhea.  no confusion,  no fever or chills    Physical Exam:   I/O last 3 completed  "shifts:  In: 800 [I.V.:800]  Out: 1800 [Urine:1800]  /57 (BP Location: Right arm)   Pulse 101   Temp 97.9  F (36.6  C) (Oral)   Resp 18   Ht 1.676 m (5' 6\")   Wt 129 kg (284 lb 6.3 oz)   SpO2 96%   BMI 45.90 kg/m      GENERAL APPEARANCE: alert and no distress  EYES:  no scleral icterus, pupils equal  PULM: lungs clear to auscultation, equal air movement, no cyanosis or clubbing  CV: regular rhythm, normal rate, no rub     -JVP -     -edema trace    GI: soft, non tender,   NEURO: mentation intact and speech normal, no asterixis       Labs:   All labs reviewed by me  Electrolytes/Renal - Recent Labs   Lab Test 01/30/23  1217 01/29/23  0626 01/28/23  2221 10/14/19  0637 10/10/19  0831 10/04/19  1410 10/04/19  0704 10/03/19  2049 10/03/19  0604 10/02/19  0545    135* 132*   < > 141   < > 137   < > 139 140   POTASSIUM 3.6 4.6 5.2   < > 3.8   < > 4.2   < > 3.8 3.8   CHLORIDE 112* 108* 103   < > 108   < > 105   < > 105 106   CO2 14* 12* 13*   < > 24   < > 26   < > 25 26   BUN 47.6* 54.6* 55.5*   < > 20   < > 29   < > 22 18   CR 2.65* 2.88* 3.25*   < > 0.77   < > 0.93   < > 0.79 0.76   GLC 82 73 84   < > 108*   < > 97   < > 96 94   MARIA INES 8.2* 8.4* 8.9   < > 9.2   < > 9.2   < > 9.1 9.0   MAG 1.5*  --  1.4*  --  1.9   < > 2.0   < > 2.1 2.2   PHOS  --   --   --   --   --   --  4.2  --  4.3 4.4    < > = values in this interval not displayed.       CBC -   Recent Labs   Lab Test 01/30/23  0237 01/29/23  1827 01/29/23  0626 01/28/23  2221   WBC 0.2* 0.2* 0.3* 0.4*   HGB 7.8* 8.1* 9.2* 9.4*   PLT 20* 31*  --  59*       LFTs -   Recent Labs   Lab Test 01/28/23  2221 12/06/22  1049 10/24/22  1138 05/24/22  0959   ALKPHOS 57 65  --  70   BILITOTAL 0.7 0.3  --  0.4   ALT 13 12  --  15   AST 28 12  --  14   PROTTOTAL 6.1* 7.5 8.0 8.2   ALBUMIN 2.5* 3.6  --  3.5       Iron Panel -   Recent Labs   Lab Test 01/29/23  0626 12/18/19  1129 09/24/16  1304   IRON 47 46 71   IRONSAT 40 17  --    RAMÍREZ 1,499* 125 83 "           Current Medications:    ARIPiprazole  5 mg Oral Daily     ceFEPIme  2 g Intravenous Q12H     cyanocobalamin  1,000 mcg Intramuscular Daily     doxycycline hyclate  100 mg Oral Q12H [facility] (08/20)     DULoxetine  120 mg Oral Daily     filgrastim  480 mcg Subcutaneous Daily at 8 pm     fluconazole  100 mg Oral Daily     folic acid  1 mg Oral Daily     [START ON 1/31/2023] folic acid  2 mg Oral Daily     gabapentin  100 mg Oral TID     miconazole   Topical BID     pantoprazole  40 mg Intravenous Q12H     sodium chloride (PF)  3 mL Intracatheter Q8H     sodium chloride (PF)  3 mL Intracatheter Q8H       IV infusion builder WITH additives 100 mL/hr at 01/30/23 0610     Warfarin Therapy Reminder       Sarahi Kelley MD

## 2023-01-30 NOTE — CONSULTS
[facility] Nurse Inpatient Assessment     Consulted for: Abdomen, bilateral breast, umbilicus, groin right popliteal    Patient History (according to provider note(s):      62-year-old female with rheumatoid arthritis on methotrexate, prior massive PE on warfarin, morbid obesity who presented on 1/28/2023 with severe sepsis.  She was found unresponsive in her home.  Was hypotensive, had temp of 100.2 in route to hospital.      Severe sepsis-present on admission  Neutropenic fever  Panniculitis  Candidal rash-under pannus, breasts    Areas Assessed:      Areas visualized during today's visit: Skin folds     Skin Injury Location: Bilateral breast, bilateral groin, pannus, umbilicus, right popliteal  Last photo: 1/30/23  Right breast    Left Breast    Right abdomen    Left abdomen    Umbilicus    Right medial popliteal      Skin injury due to: Fungal rash  and Intertrigo  Skin history and plan of care:  Patient on disability, patient has no assistance at home. Poor hygiene. Arrives with rashes and denudment within multiple skin folds       Skin assessment: Denudement, Erosion of epidermis, Lesions-satellite and Rash     Measurements (length x width x depth, in cm) Right breast  approx 10 cm x 30 cm x 0.1cm, Left breast 0.7cm x 10cm x 0.1cm Umblicus 3.2cm x 0.5cm x 0.1cm( but into umbilical crease). Pannus/Groin/Mons Pubis wound extends full width of abdomen approximately 75 cm, length runs from abdomen through mons pubis and bilateral groin approximately 25 cm with a depth of 0.1cm. Right popliteal  Approximately 5cm x 10cm x 0.1cm      Color: pink     Temperature  normal      Drainage: small .      Color: serous and serosanguinous      Odor: mild  Pain: moderate, burning  Pain interventions prior to dressing change: patient tolerated well and slow and gentle cares   Treatment goal: Heal , Infection control/prevention and Decrease moisture  STATUS: initial assessment  Supplies ordered:  supplies stored on unit, discussed with RN and discussed with patient         Treatment Plan:     Bilateral breast, Umbilicus, Pannus, Bilateral Groin, Right popliteal wound(s): BID (consider premedicating)  1. Cleanse gently with soap and water. Pat only if able. Ensure to dry fully by patting.  2. Apply miconazole powder to gloved hand and pat into red rash to avoid caking area.  3. Apply interdry into skin folds in a single layer ensuring that 2 inches are sticking out of skin fold to allow for evaporation of moisture. Since we are using with powder replace at least daily with the morning change.     Orders: Written    RECOMMEND PRIMARY TEAM ORDER: Antifungal powder- Vocera message sent to Dr. Levy indicating recommendation to stop nystatin cream and resuming miconazole powder  Education provided: plan of care, Moisture management and Hygiene  Discussed plan of care with: Patient, Nurse and Physician  WOC nurse follow-up plan: 1-2x weekly  Notify WOC if wound(s) deteriorate.  Nursing to notify the Provider(s) and re-consult the WOC Nurse if new skin concern.    DATA:     Current support surface: Standard  Standard gel/foam mattress (IsoFlex, Atmos air, etc)  Containment of urine/stool: Incontinent pad in bed and Purewick external catheter   BMI: Body mass index is 45.9 kg/m .   Active diet order: Orders Placed This Encounter      Combination Diet Low Saturated Fat Na <2400mg Diet, No Caffeine Diet     Output: I/O last 3 completed shifts:  In: 800 [I.V.:800]  Out: 1800 [Urine:1800]     Labs: Recent Labs   Lab 01/30/23  1217 01/30/23  0237 01/29/23  0626 01/28/23  2221   ALBUMIN  --   --   --  2.5*   HGB  --  7.8*   < > 9.4*   INR 2.80*  --   --  1.99*   WBC  --  0.2*   < > 0.4*    < > = values in this interval not displayed.     Pressure injury risk assessment:   Sensory Perception: 3-->slightly limited  Moisture: 2-->very moist  Activity: 2-->chairfast  Mobility: 2-->very limited  Nutrition:  3-->adequate  Friction and Shear: 1-->problem  Ole Score: 13    Neeta Robledo CWOCN   Dept. Vocera- Contact [facility] Nurse (Raoul) via  Vocera   Dept. Office Number: [phone]

## 2023-01-30 NOTE — PLAN OF CARE
Goal Outcome Evaluation:    [id number]    Pt tachycardic, soft BP. Otherwise VSS on room air. Continuous heart monitor indicating sinus tach. Bedside ECHO completed, results abnormal. Cardiology consulted and plan for LETA tomorrow. A+Ox4. Pain controlled with scheduled gabapentin & PRN tylenol. Ax2 w/ ceiling lift, turn/reposition q2h. Reports generalized weakness. L PIV running 0.45% NS+sodium bicarb @ 100 ml/hr. R PIV SL between IV antibiotics. Skin folds reddened w/ excoriation. WOC consulted and wound orders placed. Tolerating cardiac diet (low sodium/fat, no caffeine) w/o nausea or vomiting. Incontinent of B/B. Utilizing purewick w/ good UOP. No BM noted this shift. ID, nephrology, heme/onc consulted. SW following for discharge needs. Will continue with plan of care.

## 2023-01-30 NOTE — PROGRESS NOTES
[facility]  Hematology-Oncology Follow-up Note      Today's Date: 01/30/23  Date of Admission:  1/28/2023  Reason for Consult: Pancytopenia with severe B12 and folate deficiencies      ASSESSMENT:  Shira Rodriguez is a 62 year old female who has been admitted with septic shock and neutropenic fevers    Neutropenic fever   Bacterial sepsis  Acute kidney injury/ acute renal failure  Pancytopenia with severe neutropenia, moderate anemia, with severe B12 and folate deficiency  Morbid obesity    Shira has been admitted with severe sepsis in the setting of pancytopenia. She has severe deficiency in B12 and folic acid. I have started on B12 supplementation 1000 mcg daily subcutaneous. I will start her on folate supplementation today. I will administer 5 mg today and 2 mg daily starting tomorrow. Her pancytopenia can be explained with severe B12 and folate deficiencies. This should correct over time and we will have to support her until then. Agree with extensive work up as per infectious disease team and broad spectrum antibiotics for her severe sepsis at admission.      Her d-dimer levels and fibrinogen are markedly elevated. These are likely related to the pro-inflammatory state. However d-dimer levels are quite elevated for her. She is not in DIC with elevated fibrinogen levels which was a concern. Her flow cytometry, protein electrophoresis, Ig G, A, M levels pending.     Recent Labs   Lab Test 01/29/23  1829 09/19/19  1549 09/19/19  0337 09/18/19  1557 09/18/19  0346   DD 7.38* >20.0* 19.0* 15.2* 12.5*          She has nose bleeds with ongoing anticoagulation for pulmonary embolism and current platelet counts of 20k.    RECOMMENDATIONS:  - Will replace vitamin B12 and folate  - Stat methotrexate levels added to assess for methotrexate toxicity  - Fibrinogen elevated and despite very high d-dimer argues against DIC  - Recommend platelet transfusion to keep platelet counts > 50k    She had  bilateral life threatening pulmonary embolism with cardiac arrest and now has d-dimer of 7.38  - Will start her on neupogen for severe neutropenia with sepsis - 480 mcg daily until ANC > 1500  - Await remainder of labs  - Agree with management as per primary  - bone marrow biopsy only if pancytopenia persistent post correction of B12/ folate deficiencies and current infection    Over 50 min spent on day of visit including review of tests, obtaining/reviewing separately obtained history/physical exam, counseling patient, ordering medications/tests/procedures, communicating with PCP/consultants, and documenting in electronic medical record.    Julio César Hills  Hematologist and Medical Oncologist  [facility]      INTERIM HISTORY:       MEDICATIONS:  Current Facility-Administered Medications   Medication     acetaminophen (TYLENOL) tablet 650 mg    Or     acetaminophen (TYLENOL) Suppository 650 mg     ARIPiprazole (ABILIFY) tablet 5 mg     ceFEPIme (MAXIPIME) 2 g vial to attach to  ml bag for ADULTS or 50 ml bag for PEDS     cyanocobalamin injection 1,000 mcg     doxycycline hyclate (VIBRAMYCIN) capsule 100 mg     DULoxetine (CYMBALTA) DR capsule 120 mg     fluconazole (DIFLUCAN) tablet 100 mg     folic acid (FOLVITE) tablet 1 mg     gabapentin (NEURONTIN) capsule 100 mg     ipratropium - albuterol 0.5 mg/2.5 mg/3 mL (DUONEB) neb solution 3 mL     lidocaine (LMX4) cream     lidocaine (LMX4) cream     lidocaine 1 % 0.1-1 mL     lidocaine 1 % 0.1-1 mL     melatonin tablet 3 mg     NaCl 0.45 % 1,000 mL with sodium bicarbonate 75 mEq/L infusion     naloxone (NARCAN) injection 0.2 mg    Or     naloxone (NARCAN) injection 0.4 mg    Or     naloxone (NARCAN) injection 0.2 mg    Or     naloxone (NARCAN) injection 0.4 mg     nitroGLYcerin (NITROSTAT) sublingual tablet 0.4 mg     nystatin (MYCOSTATIN) cream     ondansetron (ZOFRAN ODT) ODT tab 4 mg    Or     ondansetron (ZOFRAN) injection 4 mg     oxyCODONE IR (ROXICODONE)  "half-tab 2.5 mg     pantoprazole (PROTONIX) IV push injection 40 mg     senna-docusate (SENOKOT-S/PERICOLACE) 8.6-50 MG per tablet 1 tablet    Or     senna-docusate (SENOKOT-S/PERICOLACE) 8.6-50 MG per tablet 2 tablet     sodium chloride (PF) 0.9% PF flush 3 mL     sodium chloride (PF) 0.9% PF flush 3 mL     sodium chloride (PF) 0.9% PF flush 3 mL     sodium chloride (PF) 0.9% PF flush 3 mL     WARFARIN THERAPY REMINDER     Facility-Administered Medications Ordered in Other Encounters   Medication     sodium chloride (PF) 0.9% PF flush 10 mL           ALLERGIES:  Allergies   Allergen Reactions     Adhesive Tape Blisters     Erythromycin Rash         PHYSICAL EXAM:  Vital signs:  Temp: 97.9  F (36.6  C) Temp src: Oral BP: 100/57 Pulse: 101   Resp: 18 SpO2: 96 % O2 Device: None (Room air)   Height: 167.6 cm (5' 6\") Weight: 129 kg (284 lb 6.3 oz)  Estimated body mass index is 45.9 kg/m  as calculated from the following:    Height as of this encounter: 1.676 m (5' 6\").    Weight as of this encounter: 129 kg (284 lb 6.3 oz).    LABS:  Recent Labs   Lab Test 01/29/23 0626 01/28/23 2221 12/07/22  1340 12/06/22  1049 10/19/22  1335 05/24/22  0959   * 132* 137 139  --  140   POTASSIUM 4.6 5.2 4.5 5.0  --  4.7   CHLORIDE 108* 103 105 106  --  109   CO2 12* 13* 14* 18*  --  26   ANIONGAP 15 16* 18* 15  --  5   BUN 54.6* 55.5* 55.0* 46.5*  --  29   CR 2.88* 3.25* 2.18* 2.11* 1.42* 1.76*   GLC 73 84 90 146*  --  105*   MARIA INES 8.4* 8.9 9.3 9.7  --  9.7     Recent Labs   Lab Test 01/28/23  2221 10/10/19  0831 10/07/19  0716 10/06/19  0702 10/05/19  0521 10/04/19  0704 10/03/19  2049 10/03/19  0604 10/02/19  0545 10/01/19  0548 09/30/19  0625   MAG 1.4* 1.9 2.2 2.0 2.0 2.0   < > 2.1 2.2 1.9 2.2   PHOS  --   --   --   --   --  4.2  --  4.3 4.4 3.9 4.0    < > = values in this interval not displayed.     Recent Labs   Lab Test 01/30/23  0237 01/29/23  1827 01/29/23  0626 01/28/23  2221 12/07/22  1340 12/06/22  1049 05/24/22  0959 " 01/13/21  1419 09/24/20  1415   WBC 0.2* 0.2* 0.3* 0.4* 10.6 15.4* 10.0   < > 6.8   HGB 7.8* 8.1* 9.2* 9.4* 13.4 13.9 14.9   < > 15.0   PLT 20* 31*  --  59* 271 369 290   < > 146*   MCV 88 89 89 90 88 89 96   < > 91   NEUTROPHIL  --   --   --  24 78 86 75  --  77.7    < > = values in this interval not displayed.     Recent Labs   Lab Test 01/29/23  1827 01/28/23  2221 12/06/22  1049 10/24/22  1138 05/24/22  0959 10/06/19  0702 10/05/19  0521   BILITOTAL  --  0.7 0.3  --  0.4   < > 0.4   ALKPHOS  --  57 65  --  70   < > 65   ALT  --  13 12  --  15   < > 18   AST  --  28 12  --  14   < > 12   ALBUMIN  --  2.5* 3.6  --  3.5   < > 2.8*     --   --  300*  --   --  202    < > = values in this interval not displayed.     TSH   Date Value Ref Range Status   12/06/2022 1.60 0.30 - 4.20 uIU/mL Final   05/24/2022 2.23 0.40 - 4.00 mU/L Final   06/16/2021 1.43 0.40 - 4.00 mU/L Final   09/24/2020 1.59 0.40 - 4.00 mU/L Final   06/12/2020 1.49 0.40 - 4.00 mU/L Final       PATHOLOGY:  Lab Results   Component Value Date    PATH  01/16/2020     Patient Name: XAVIER ROWELL  MR#: [id number]  Specimen #: [id number]  Collected: 1/16/2020  Received: 1/16/2020  Reported: 1/21/2020 15:31  Ordering Phy(s): JAX WESTON    For improved result formatting, select 'View Enhanced Report Format' under   Linked Documents section.    SPECIMEN/STAIN PROCESS:  Pleural Fluid       Pap-Cyto x 1, Aponte's stain-cyto x 1, Cell Block w/ H&E-Cyto x 1,   Cell Block, Level 2 x 1, Cell Block,  Level 3 x 1, Yasmani-EP4 x 1, MOC-31 x 1, CD68 KP-1 x 1, Wilm's Tumor x 1,   Calretinin x 1    ----------------------------------------------------------------    CYTOLOGIC INTERPRETATION:    Pleural Fluid:  - Negative for malignancy  - Acute inflammation present.  Specimen Adequacy: Satisfactory for evaluation.    I have personally reviewed all specimens and/or slides, including the   listed special stains, and used them  with my medical judgement to determine  or confirm the final diagnosis.    Electronically signed out by:  Mini Choi M.D., [facility]    CLINICAL HISTORY:  59 year old female who is followed for dyspnea, obesity, nicotine   dependence.    ,    GROSS:  Pleural Fluid: Received 280 ml of dark brown, cloudy fluid, processed as 1   Pap stained Autocyte, 1 Aponte  stained cytospin and one hematoxylin and eosin stained cell block.    MICROSCOPIC:  Microscopic examination is performed.  Immunohistochemistry was performed   with appropriate controls.  CD68  highlight abundant macrophages.  Yasmani-EP4 and MOC-31 are negative.    Calretinin and WT-1 are negative.  These  findings support the diagnosis.  Eileen Yanes MD (Cytopathology Fellow)          Mini Choi MD    CPT Codes:  A: 20446-UHTRXVY, 41674-AESBOGV, 54520-VRA, HCB, 57474-YPT, 80417-MJV,   35246-VLV, 43173-RYK, 31486-MDG    COLLECTION SITE:  Client:  [facility]  Location:  [facility] ()    The technical component of this testing was completed at the [facility], with the professional component performed   at the [facility], [address] [place] [id number] ([phone])    Resident  [email or web address]         IMAGING:  Results for orders placed or performed during the hospital encounter of 01/28/23   XR Chest Port 1 View    Narrative    EXAM: XR CHEST PORT 1 VIEW  LOCATION: [facility]  DATE/TIME: 1/28/2023 11:57 PM    INDICATION: Sepsis.  COMPARISON: 12/06/2022      Impression    IMPRESSION:     No focal airspace disease. No pleural effusion or pneumothorax.    The cardiomediastinal silhouette is unremarkable. Aortic calcifications.   CT Head w/o Contrast    Narrative    EXAM: CT HEAD W/O CONTRAST  LOCATION: [facility]  DATE/TIME: 1/29/2023 12:33 AM    INDICATION: Delirium and  unresponsiveness  COMPARISON:  CT head 09/10/2019.  TECHNIQUE: Routine CT Head without IV contrast. Multiplanar reformats. Dose reduction techniques were used.    FINDINGS:  INTRACRANIAL CONTENTS: No intracranial hemorrhage, extraaxial collection, or mass effect.  No CT evidence of acute infarct. Mild presumed chronic small vessel ischemic changes. Mild generalized volume loss. No hydrocephalus.     VISUALIZED ORBITS/SINUSES/MASTOIDS: No intraorbital abnormality. No significant paranasal sinus mucosal disease. No middle ear or mastoid effusion.    BONES/SOFT TISSUES: No acute abnormality.      Impression    IMPRESSION:  1.  No CT evidence for acute intracranial process.  2.  Brain atrophy and presumed chronic microvascular ischemic changes as above.   CT Chest Abdomen Pelvis w/o Contrast    Narrative    EXAM: CT CHEST ABDOMEN PELVIS W/O CONTRAST  LOCATION: [facility]  DATE/TIME: 1/29/2023 12:32 AM    INDICATION: Sepsis.  COMPARISON: Chest CT from 7/21/2022 and 5/24/2022.  TECHNIQUE: CT scan of the chest, abdomen, and pelvis was performed without IV contrast. Multiplanar reformats were obtained. Dose reduction techniques were used.   CONTRAST: None.    FINDINGS:   LUNGS AND PLEURA: Patchy mostly linear opacities left lower lobe inferiorly favored to be due to some chronic scarring and/or atelectasis and similar to previous. Additional scattered areas of mild subpleural scarring or atelectasis elsewhere in the   periphery of the lungs. No definite acute appearing infiltrates or consolidation. Central airways are clear. Small chronic appearing left pleural effusion is unchanged.    MEDIASTINUM/AXILLAE: Small amount of fluid adjacent to the distal esophagus of uncertain significance. Small hiatal hernia. Scattered small posterior mediastinal lymph nodes probably reactive in nature. Normal heart size. No pericardial effusion. Normal   caliber thoracic aorta. Axillary regions are  unremarkable.    CORONARY ARTERY CALCIFICATION: Mild.    HEPATOBILIARY: Liver is negative. Cholelithiasis. No biliary dilatation.    PANCREAS: Normal.    SPLEEN: Normal.    ADRENAL GLANDS: 3 cm right adrenal nodule is technically indeterminate on this study but unchanged compared to 05/24/2022 and compatible with an adrenal adenoma based off an older MRI scan by report. Left adrenal gland is negative.    KIDNEYS/BLADDER: Normal.    BOWEL: Bowel is normal in caliber with no evidence for obstruction. Question some mild edema seen around the second and third portions of the duodenum, nonspecific. Normal appendix. Scattered colonic diverticuli without evidence for diverticulitis. No   acute bowel findings.    LYMPH NODES: Normal.    VASCULATURE: Unremarkable.    PELVIC ORGANS: Normal.    MUSCULOSKELETAL: Mild to moderate degenerative changes throughout the spine. Right TAWNY. Moderate size fat-containing umbilical hernia.      Impression    IMPRESSION:  1.  Areas of mostly linear scarring and/or atelectasis in the lungs greatest in the left lung base. Nothing definite for acute pneumonitis.    2.  Stable chronic appearing small left pleural effusion.    3.  Small amount of fluid seen adjacent to the distal thoracic esophagus of uncertain significance. Scattered nonspecific posterior mediastinal lymph nodes along the course of the esophagus. These findings are of uncertain significance or etiology.   Correlation for any signs of esophagitis or other inflammatory process.    4.  Cholelithiasis. No biliary dilatation.    5.  Question small amount of edema seen around the second and third portions of the duodenum. Clinical correlation to exclude any signs of a duodenal inflammatory process. Correlation with pancreatic enzymes also suggested to exclude pancreatitis.    6.  3 cm right adrenal nodule is technically indeterminate on this study but unchanged from prior CT scan and reportedly represents a benign adrenal adenoma  based off a prior MRI.    7.  Scattered colonic diverticuli without evidence for diverticulitis. Normal appendix.     *Note: Due to a large number of results and/or encounters for the requested time period, some results have not been displayed. A complete set of results can be found in Results Review.

## 2023-01-30 NOTE — PROGRESS NOTES
[facility]    Infectious Disease Progress Note    Date of Service : 01/30/2023     Assessment:  62 YF with RA on Methotrexate (recently transitioned from subcutaneous dosing to PO), morbid obesity , hx of PEA cardiac arrest due to PE who has been hospitalized with progressive fatigue, was found down and is noted to have severe pancytopenia as well as lactic acidosis related to neutropenic fever. She has significant intertrigo infection in the inframammary and groin folds which is fungal . No bacteremia or other focus of infection found thus far. Pancytopenia may be related to Methotrexate toxicity vs other hematological disorder and if counts don;t improve soon, she will need a bone marrow biopsy.    -Pancytopenia ? Methotrexate toxicity vs bone marrow disorder  -Neutropenic fever, though no apparent focus of infection  -Fungal intertrigo infection in inframammary and groin skin folds  -Hyponatremia  -KRISS   -Lactic acidosis  -Rheumatoid arthritis - on Methotrexate thrice/week  -Hx of PE with PEA cardiac arrest requiring ECMO  -Chronic left pleural effusion  -Morbid obesity    Recommendations:  1. Hold Methotrexate  2. Discontinue Vancomycin  3. Continue cefepime for neutropenic fever, and continue Doxycyline for now though no history to suggest tick borne infection . Diagnostics are pending  4. Will likely need a bone marrow biopsy for further evaluation  5. Add Fluconazole for candida intertrigo  6. Follow Ehrlichia/anaplasma PCR, Ricketssial ab, beta d glucan, blast antigen and HIV test, CMV ab negative    ID will continue to follow  Scarlett Zhu MD    Interval History   Patient was seen and examined, chart reviewed  Feels very tired and unwell, complains of groin discomfort due to rash, notes rash appeared within the past few days. Has been more fatigued over several weeks, no outdoor exposure, hiking, camping etc or travel history.    Antimicrobial therapy  1/28 Cefepime  1/29  Vancomycin  1/29 Dpxycycline    Physical Exam   Temp: 97.9  F (36.6  C) Temp src: Oral BP: 100/57 Pulse: 101   Resp: 18 SpO2: 96 % O2 Device: None (Room air)    Vitals:    01/29/23 1935   Weight: 129 kg (284 lb 6.3 oz)     Vital Signs with Ranges  Temp:  [97.8  F (36.6  C)-99  F (37.2  C)] 97.9  F (36.6  C)  Pulse:  [] 101  Resp:  [16-28] 18  BP: (100-159)/(49-99) 100/57  SpO2:  [95 %-100 %] 96 %    Constitutional: Awake, alert, cooperative, very fatigued  Lungs: Clear to auscultation bilaterally, no crackles or wheezing  Cardiovascular: Regular rate and rhythm, normal S1 and S2, and no murmur noted  Abdomen: Normal bowel sounds, soft, non-distended, non-tender  Skin: Extensive intertrigo infection groin folds and inframammary area    Other:    Medications     IV infusion builder WITH additives 100 mL/hr at 01/30/23 0610     Warfarin Therapy Reminder         ARIPiprazole  5 mg Oral Daily     ceFEPIme  2 g Intravenous Q12H     cyanocobalamin  1,000 mcg Intramuscular Daily     doxycycline  100 mg Intravenous Q12H     DULoxetine  120 mg Oral Daily     gabapentin  100 mg Oral TID     nystatin   Topical BID     pantoprazole  40 mg Intravenous Q12H     sodium chloride (PF)  3 mL Intracatheter Q8H     sodium chloride (PF)  3 mL Intracatheter Q8H     vancomycin place zelaya - receiving intermittent dosing  1 each Does not apply See Admin Instructions       Data   All microbiology laboratory data reviewed.  Recent Labs   Lab Test 01/30/23  0237 01/29/23  1827 01/29/23  0626 01/28/23 2221   WBC 0.2* 0.2* 0.3* 0.4*   HGB 7.8* 8.1* 9.2* 9.4*   HCT 24.0* 24.7* 28.3* 29.7*   MCV 88 89 89 90   PLT 20* 31*  --  59*     Recent Labs   Lab Test 01/29/23  0626 01/28/23  2221 12/07/22  1340   CR 2.88* 3.25* 2.18*     Recent Labs   Lab Test 01/13/21  1419   SED 15     Component      Latest Ref Rng & Units 1/29/2023   CMV Caron IgG Instrument Value      <0.60 U/mL <0.20   CMV Antibody IgG      No detectable antibody.  No detectable  antibody.   CMV Caron IgM Instrument Value      <30.0 AU/mL <8.0   CMV Antibody IgM      Negative Negative     Microbiology  1/28 blood cx no growth    Imaging  1/28 Ct chest abdomen pelvis  EXAM: CT CHEST ABDOMEN PELVIS W/O CONTRAST  LOCATION: [facility]  DATE/TIME: 1/29/2023 12:32 AM     INDICATION: Sepsis.  COMPARISON: Chest CT from 7/21/2022 and 5/24/2022.  TECHNIQUE: CT scan of the chest, abdomen, and pelvis was performed without IV contrast. Multiplanar reformats were obtained. Dose reduction techniques were used.   CONTRAST: None.     FINDINGS:   LUNGS AND PLEURA: Patchy mostly linear opacities left lower lobe inferiorly favored to be due to some chronic scarring and/or atelectasis and similar to previous. Additional scattered areas of mild subpleural scarring or atelectasis elsewhere in the   periphery of the lungs. No definite acute appearing infiltrates or consolidation. Central airways are clear. Small chronic appearing left pleural effusion is unchanged.     MEDIASTINUM/AXILLAE: Small amount of fluid adjacent to the distal esophagus of uncertain significance. Small hiatal hernia. Scattered small posterior mediastinal lymph nodes probably reactive in nature. Normal heart size. No pericardial effusion. Normal   caliber thoracic aorta. Axillary regions are unremarkable.     CORONARY ARTERY CALCIFICATION: Mild.     HEPATOBILIARY: Liver is negative. Cholelithiasis. No biliary dilatation.     PANCREAS: Normal.     SPLEEN: Normal.     ADRENAL GLANDS: 3 cm right adrenal nodule is technically indeterminate on this study but unchanged compared to 05/24/2022 and compatible with an adrenal adenoma based off an older MRI scan by report. Left adrenal gland is negative.     KIDNEYS/BLADDER: Normal.     BOWEL: Bowel is normal in caliber with no evidence for obstruction. Question some mild edema seen around the second and third portions of the duodenum, nonspecific. Normal appendix. Scattered  colonic diverticuli without evidence for diverticulitis. No   acute bowel findings.     LYMPH NODES: Normal.     VASCULATURE: Unremarkable.     PELVIC ORGANS: Normal.     MUSCULOSKELETAL: Mild to moderate degenerative changes throughout the spine. Right TAWNY. Moderate size fat-containing umbilical hernia.                                                                      IMPRESSION:  1.  Areas of mostly linear scarring and/or atelectasis in the lungs greatest in the left lung base. Nothing definite for acute pneumonitis.     2.  Stable chronic appearing small left pleural effusion.     3.  Small amount of fluid seen adjacent to the distal thoracic esophagus of uncertain significance. Scattered nonspecific posterior mediastinal lymph nodes along the course of the esophagus. These findings are of uncertain significance or etiology.   Correlation for any signs of esophagitis or other inflammatory process.     4.  Cholelithiasis. No biliary dilatation.     5.  Question small amount of edema seen around the second and third portions of the duodenum. Clinical correlation to exclude any signs of a duodenal inflammatory process. Correlation with pancreatic enzymes also suggested to exclude pancreatitis.     6.  3 cm right adrenal nodule is technically indeterminate on this study but unchanged from prior CT scan and reportedly represents a benign adrenal adenoma based off a prior MRI.     7.  Scattered colonic diverticuli without evidence for diverticulitis. Normal appendix.

## 2023-01-30 NOTE — PLAN OF CARE
Goal Outcome Evaluation:       Patient remains stable with vitals in the normal range. Alert and oriented. Denied pain at rest, but moans and grimaces with activity. Declined any pain medicine. Lactic acid was drawn per protocol. Result was 0.4. Voiding into pure wick. Good urine output. On doxycycline Q 12 H. Bicarb maintenance at 100 ml/hr. Hemeonc labs pending in the morning. WOC consult pending. Perineal and skin fold redness/rash. Leg wounds. Lift with 2 assist to get out of bed. Turn and reposition.

## 2023-01-30 NOTE — PLAN OF CARE
Goal Outcome Evaluation:       Summary:  1-29-23, 1900-  Primary Diagnosis: septic shock  Orientation: A/O x4  Aggression Stop Light: green  Mobility: A x 2 lift  Pain Management: tylenol  Diet: low fat, NA, no caffeine  Bowel/Bladder: incontinent, purewick  Abnormal Lab/Assessments: creat 2.88, platelet 31  Drain/Device/Wound: @ PIV L IV 1/2 NA/bicarb at 100 int antx  Consults: hem/onc, I and D, neph, WOC  D/C Day/Goals/Place: pending    Shift Note:  A/O x4. VSS on RA. No n/v or SOB. Red skin folds breast and abdomin and perineum. Interdry in skin folds. Some edema. No numbness/tingling. Urine samples sent down. Discharge pending.

## 2023-01-30 NOTE — PROGRESS NOTES
[facility]    Medicine Progress Note - Hospitalist Service    Date of Admission:  1/28/2023    Assessment & Plan     Shira Rodriguez is a 62-year-old female with rheumatoid arthritis on methotrexate, prior massive PE on warfarin, morbid obesity who presented on 1/28/2023 with severe sepsis.  She was found unresponsive in her home. She  Was hypotensive and  had temp of 100.2 in route to hospital.      Severe sepsis-present on admission  candida intertrigo  Neutropenic fever  Lactic acidosis -Resolved   - With lactic acidosis of 3.2, hypotension, HR of 105, WBC of 0.4, acute metabolic encephalopathy - met criteria for severe sepsis on admission  - CT C/A/P showed small chronic left pleural effusion, small fluid around distal esophagus and duodenum  - she was  Noted to have panniculitis under abdominal pannus, significant redness under her breasts.  Source could be panniculitis, mucositis due to severe neutropenia, duodenitis.  - UA does not suggest UTI, no pneumonia, COVID-19, influenza and RSV negative and   Has a cough which is chronic. Has had severe throat pain for 2 days.  Has diverticulosis but no diverticulitis  - Blood cultures negative so far  - Blood pressure improved with IV fluids and is currently in normal range.    - Lactic acidosis has resolved  -She was started on broad-spectrum antibiotics including cefepime, vancomycin and infectious disease has been consulted and following with oncology and doxycycline was added  -Further work-up has been ordered includingRMSF Ab, ehrlichia and anaplasma serologies and PCRs.Serum fungitell, serum & urine histoplasma Ag and blastomyces Ag.Serum CMV/EBV/HSV PCR and also CMV & EBV serologies; HIV Ab screen.Flow cytometry, EMERY, ANCA anti-GBM Ab, complement levels .  Also if she continues to have worsening back pain then MRI of the lumbar spine will need to be done with and without contrast  -I discussed her case with ID and appreciate input  and continue to hold methotrexate, discontinue vancomycin, continue cefepime for neutropenic fever and continue doxycycline for now though no history to suggest tickborne infection and will need bone marrow biopsy for further evaluation  -Fluconazole was also added for Candida intertrigo  -We will continue to monitor her closely     Acute metabolic encephalopathy-secondary to severe sepsis-resolved  -Head CT-did not show any acute changes  -Patient is alert oriented x3 and is back to baseline     Pancytopenia with severe leukopenia and neutropenia, moderate anemia and thrombocytopenia  Severe vitamin B12 deficiency and folate deficiency  - On 12/7/2022, CBC showed hemoglobin 13.4, WBC 10.6, platelets 271  - On admission, 1/28 - CBC showed Hb 9.4, WBC 0.4, ANC 0.1, platelets 59  - Iron studies showed normal iron saturation, low iron binding capacity indicating anemia of chronic disease.  Reticulocyte count suppressed  - B12 low at < 150  -Oncology was consulted and her methotrexate was held and she was started on cyanocobalamin  -  did review her CBC from today and her WBC count is 0.8, hemoglobin is 7.8 with platelet count of 20  -I also reviewed her labs including she has elevated D-dimer of 7.38, fibrinogen is 932 and PTT is 42 with INR of 1.99  -Following lab tests are pending- immunoglobulins A, G, M, serum protein immunofixation, kappa and lambda light chain, serum protein electrophoresis, LDH, flow cytometry, serum folate  -folate levels are significantly low at 2.8 and we will start her on replacement protocol with folate  - spoke with Dr meza and we will give one unit of platlet and INR is 2.80 and we will hold coumadin dose and reasses daily      Abnormal echo  -Echo was done which shows EF of 55 to 60%, mild to moderate LVH, mildly decreased right ventricular systolic function, mild to moderate valvular aortic stenosis andthe ascending aorta at sinotubular ridge is not well seen  but there is eiither  artifact vs possible dissection vs vegetation/flap. REC-  LETA if possible or at least CT aortogram   -I spoke with Dr. Regan from cardiology and discussed her case in detail and the LETA was ordered and patient was made n.p.o. and RN betty perez was notified      Acute kidney injury on top of CKD 4- Improving  Mild anion gap metabolic acidosis   - secondary to lactic acidosis and acute kidney injury, pH 7.25 on admission  - Baseline creatinine 2-2.2.  Creatinine on admission 3.25.  Now improved to 2.88 with IV fluids  -Her bicarb was low at 12 and nephrology was consulted and also her other home medications including lisinopril has been held  -I did review her labs this morning and her creatinine has improved to 2.65 and her bicarb is improved to 14 and continue patient with bicarb drip and nephrology is on board and appreciate input     Probable esophagitis  Probable duodenitis  -CT on admission showed small amount of fluid adjacent to distal thoracic esophagus and scattered posterior mediastinal lymph nodes along esophagus of uncertain etiology but could be secondary to esophagitis or other inflammatory process.  CT scan also showed small edema around second and third portion of duodenum.  -Lipase is normal  -On IV Protonix, continue and will need to have EGD when she is more stable      Hypomagnesemia  -She had magnesium of 1.4 on admission and magnesium today is low at 1.5 and we will continue the patient with replacement protocol     Mild hypovolemic hyponatremia-resolved  -Sodium 132 on admission and was given IV fluids and sodium today is normal at 138     Type II MI due to severe sepsis  -High-sensitivity troponin mildly elevated at 48 and stable on recheck     Hyperuricemia, uric acid 8.9     History of massive PE causing PEA arrest, 9/2019  -On warfarin, continue.  Dose per pharmacy     Rheumatoid arthritis  -Had been on methotrexate for some time, resumed about 2 weeks ago due to worsening back and  "shoulder pain  -Hold methotrexate      3 cm right adrenal nodule  -Noted on CT.  Was present on previous imaging as well.  Likely benign  -A.m. cortisol 1/29, normal at 43.9     COPD  -No acute exacerbation     Chronic major depression  Chronic pain  - see recent pain clinic visit summary from 1/17/2023 for background information  - Continue Abilify, Cymbalta and Neurontin     Morbid obesity, BMI 46     Hypoalbuminemia, albumin 2.5     Panniculitis  Candidal rash-under pannus, breasts  -consult WOC and they have recommended to discontinue the nystatin cream and add miconazole powder      Essential hypertension  PTA-lisinopril  -Hold lisinopril due to acute kidney injury and hypotension on admission  -We will continue to monitor her blood pressure closely and they have been      # Severe Obesity: Estimated body mass index is 45.9 kg/m  as calculated from the following:    Height as of this encounter: 1.676 m (5' 6\").    Weight as of this encounter: 129 kg (284 lb 6.3 oz).       Diet: Combination Diet Low Saturated Fat Na <2400mg Diet, No Caffeine Diet    DVT Prophylaxis: Warfarin  Underowod Catheter: Not present  Lines: None     Cardiac Monitoring: ACTIVE order. Indication: Severe sepsis  Code Status: Full Code      Clinically Significant Risk Factors            # Hypomagnesemia: Lowest Mg = 1.4 mg/dL in last 2 days, will replace as needed   # Hypoalbuminemia: Lowest albumin = 2.5 g/dL at 1/28/2023 10:21 PM, will monitor as appropriate   # Thrombocytopenia: Lowest platelets = 20 in last 2 days, will monitor for bleeding         # Severe Obesity: Estimated body mass index is 45.9 kg/m  as calculated from the following:    Height as of this encounter: 1.676 m (5' 6\").    Weight as of this encounter: 129 kg (284 lb 6.3 oz)., PRESENT ON ADMISSION         Disposition Plan     Expected Discharge Date: 01/31/2023                  Prem Levy MD  Hospitalist Service  [facility]  Securely message with " Preeti (more info)  Text page via [facility] Paging/Directory   ______________________________________________________________________    Interval History     Saw the patient today and echo was being done and patient had pain and did mention to me that the rash over her breast and groin has been present for the past 2 to 3 days.  She lives by herself any degree.  I did speak with patient's nurse multiple times and she was having pain which did not improve with Tylenol around the site of the rash and I ordered low-dose oxycodone    Physical Exam   Vital Signs: Temp: 97.9  F (36.6  C) Temp src: Oral BP: 100/57 Pulse: 101   Resp: 18 SpO2: 96 % O2 Device: None (Room air)    Weight: 284 lbs 6.29 oz        General: Patient appears comfortable and in no acute distress.  HEENT: Head is atraumatic, normocephalic.  Pupils are equal, round and reactive to light.  No scleral icterus. Oral mucosa is moist   Neck: Neck is supple and No Lymphadenopathy   Respiratory: Lungs are clear to auscultation bilaterally with no wheeze or crackles   Cardiovascular: Regular rate , S1 and S2 normal with no murmer or rubs or gallops  Abdomen:   soft , non tender , non distended and bowel sound present   Skin: She has significant panniculitis/candidia around the groin, breast folds bilaterally  Neurologic: Higher functions are within normal limits. No obvious defects in speech, language and memory. No facial droop  Musculoskeletal: Normal Range of motion over upper and lower extremities bilaterally   Psychiatric: cooperative     Medical Decision Making             Time spent in care of patient is 51 minutes and I reviewed the patient basic metabolic panel, CBC, reviewed the report of the echo, discussed her case in detail with cardiology, dr meza from hematology and discussed her case with the nursing staff, reviewed INR, D-dimer, fibrinogen      Data     I have personally reviewed the following data over the past 24 hrs:    0.2 (LL)  \   7.8 (L)   /  20 (LL)     138 112 (H) 47.6 (H) /  82   3.6 14 (L) 2.65 (H) \       Procal: N/A CRP: N/A Lactic Acid: 0.4 (L)       INR:  2.80 (H) PTT:  42 (H)   D-dimer:  7.38 (H) Fibrinogen:  932 (H)       Ferritin:  N/A % Retic:  0.2 (L) LDH:  143       Imaging results reviewed over the past 24 hrs:   Recent Results (from the past 24 hour(s))   Echocardiogram Complete   Result Value    LVEF  55-60%    Narrative    [id number]  [id number]  [id number]  [id number]^MAAME^FEDERICO^OMARI     [facility]  Echocardiography Laboratory  [address] [place] [address]     Name: XAVIER ROWELL  MRN: [id number]  : 1960  Study Date: 2023 10:46 AM  Age: 62 yrs  Gender: Female  Patient Location: [facility]  Reason For Study: CHF  Ordering Physician: FEDERICO ISABEL  Referring Physician: Anjel Busby  Performed By: Emma Foster     BSA: 2.3 m2  Height: 66 in  Weight: 284 lb  HR: 104  BP: 132/72 mmHg  ______________________________________________________________________________  Procedure  Complete Portable Echo Adult. Optison (NDC #[id number]) given intravenously.  ______________________________________________________________________________  Interpretation Summary     There is mild to moderate concentric left ventricular hypertrophy.  Left ventricular systolic function is normal.  The right ventricle is mildly dilated.  Mildly decreased right ventricular systolic function  IVC diameter >2.1 cm collapsing <50% with sniff suggests a high RA pressure  estimated at 15 mmHg or greater.  Aortic valve not well seen but it is abnormal. Likely trileaflet with  sclerosis/stenosis  Mild to moderate valvular aortic stenosis.  No aortic regurgitation is present.  ABNORMAL FINDING- the ascending aorta at sinotubular ridge is not well seen  but there is eiither artifact vs possible dissection vs vegetation/flap. REC-  LETA if possible or at least CT aortogram (this echo report called to silver)  NSR with frequent  ectopy  Technically difficult, suboptimal study.  ______________________________________________________________________________  Left Ventricle  The left ventricle is normal in size. There is mild to moderate concentric  left ventricular hypertrophy. Left ventricular systolic function is normal.  The visual ejection fraction is 55-60%. Left ventricular diastolic function is  indeterminate. Normal left ventricular wall motion.     Right Ventricle  The right ventricle is mildly dilated. Mildly decreased right ventricular  systolic function.     Atria  The left atrium is borderline dilated. Right atrial size is normal.     Mitral Valve  There is moderate mitral annular calcification.     Tricuspid Valve  No tricuspid regurgitation. Right ventricular systolic pressure could not be  approximated due to inadequate tricuspid regurgitation. IVC diameter >2.1 cm  collapsing <50% with sniff suggests a high RA pressure estimated at 15 mmHg or  greater.     Aortic Valve  The aortic valve is not well visualized. Aortic valve not well seen but it is  abnormal. Likely trileaflet with sclerosis/stenosis. No aortic regurgitation  is present. Mild to moderate valvular aortic stenosis.     Pulmonic Valve  The pulmonic valve is not well seen, but is grossly normal.     Vessels  The aortic root is not well visualized. ABNORMAL FINDING- the ascending aorta  at sinotubular ridge is not well seen but there is eiither artifact vs  possible dissection vs vegetation/flap. REC- LETA if possible or at least CT  aortogram (this echo report called to silver).     Pericardium  The pericardium appears normal.     Rhythm  Sinus rhythm was noted. NSR with frequent ectopy.  ______________________________________________________________________________  MMode/2D Measurements & Calculations  IVSd: 1.5 cm     LVIDd: 4.9 cm  LVIDs: 3.2 cm  LVPWd: 1.3 cm  FS: 33.9 %  LV mass(C)d: 275.2 grams  LV mass(C)dI: 118.5 grams/m2  Ao root diam: 3.7 cm  LA dimension:  4.5 cm  asc Aorta Diam: 3.3 cm  LA/Ao: 1.2  LVOT diam: 2.4 cm  LVOT area: 4.5 cm2  LA Volume (BP): 70.3 ml  LA Volume Index (BP): 30.3 ml/m2  RWT: 0.53     Doppler Measurements & Calculations  MV E max emile: 77.3 cm/sec  MV A max emile: 99.7 cm/sec  MV E/A: 0.78  MV dec slope: 383.5 cm/sec2  MV dec time: 0.21 sec  Ao V2 max: 333.0 cm/sec  Ao max P.4 mmHg  Ao V2 mean: 236.0 cm/sec  Ao mean P.0 mmHg  Ao V2 VTI: 59.4 cm  MERON(I,D): 1.5 cm2  MERON(V,D): 1.3 cm2  LV V1 max PG: 3.8 mmHg  LV V1 max: 97.7 cm/sec  LV V1 VTI: 20.3 cm  SV(LVOT): 91.8 ml  SI(LVOT): 39.6 ml/m2  PA acc time: 0.10 sec  TR max emile: 250.0 cm/sec  TR max P.0 mmHg  AV Emile Ratio (DI): 0.29  EMRON Index (cm2/m2): 0.67  E/E' av.5  Lateral E/e': 11.8  Medial E/e': 11.1     ______________________________________________________________________________  Report approved by: Tariq Doty 2023 01:25 PM

## 2023-01-30 NOTE — PLAN OF CARE
Goal Outcome Evaluation:    Arrived to floor at 1800 from ED. AOx4. VSS on RA. Up A2 and L, T/R q 2 hrs. Cardiac diet. C/o tenderness from wounds to pannus and under breast, declines interventions. L PIV infusing IVF @ 100ml/hr with int abx. R PIV SL. Purewick in place for incontinence. Wounds to underbreast folds, pannus folds, and behind L knee, interdry changed -   WOC consulted. Chronic congestive cough noted. WBC 0.3, Plt 31, neutropenic precautions maintained. HemOnc, ID, and Neph following.

## 2023-01-31 ENCOUNTER — APPOINTMENT (OUTPATIENT)
Dept: CARDIOLOGY | Facility: CLINIC | Age: 63
DRG: 871 | End: 2023-01-31
Attending: INTERNAL MEDICINE
Payer: COMMERCIAL

## 2023-01-31 LAB
1,3 BETA GLUCAN SER-MCNC: 100 PG/ML
A PHAGOCYTOPH IGG TITR SER IF: NORMAL {TITER}
A PHAGOCYTOPH IGM TITR SER IF: NORMAL {TITER}
ANCA AB PATTERN SER IF-IMP: NORMAL
ANION GAP SERPL CALCULATED.3IONS-SCNC: 13 MMOL/L (ref 7–15)
BACTERIA UR CULT: NO GROWTH
BLD PROD TYP BPU: NORMAL
BLD PROD TYP BPU: NORMAL
BLOOD COMPONENT TYPE: NORMAL
BLOOD COMPONENT TYPE: NORMAL
BUN SERPL-MCNC: 44.3 MG/DL (ref 8–23)
BURR CELLS BLD QL SMEAR: SLIGHT
BURR CELLS BLD QL SMEAR: SLIGHT
C-ANCA TITR SER IF: NORMAL {TITER}
C3 SERPL-MCNC: 104 MG/DL (ref 81–157)
C4 SERPL-MCNC: 30 MG/DL (ref 13–39)
CALCIUM SERPL-MCNC: 8.1 MG/DL (ref 8.8–10.2)
CHLORIDE SERPL-SCNC: 111 MMOL/L (ref 98–107)
CODING SYSTEM: NORMAL
CODING SYSTEM: NORMAL
CREAT SERPL-MCNC: 2.52 MG/DL (ref 0.51–0.95)
CREAT UR-MCNC: 45.6 MG/DL
CROSSMATCH: NORMAL
DEPRECATED HCO3 PLAS-SCNC: 17 MMOL/L (ref 22–29)
ELLIPTOCYTES BLD QL SMEAR: SLIGHT
ELLIPTOCYTES BLD QL SMEAR: SLIGHT
ERYTHROCYTE [DISTWIDTH] IN BLOOD BY AUTOMATED COUNT: 14.1 % (ref 10–15)
ERYTHROCYTE [DISTWIDTH] IN BLOOD BY AUTOMATED COUNT: 14.3 % (ref 10–15)
GFR SERPL CREATININE-BSD FRML MDRD: 21 ML/MIN/1.73M2
GLUCOSE BLDC GLUCOMTR-MCNC: 84 MG/DL (ref 70–99)
GLUCOSE BLDC GLUCOMTR-MCNC: 85 MG/DL (ref 70–99)
GLUCOSE SERPL-MCNC: 86 MG/DL (ref 70–99)
H CAPSUL AG UR QL IA: NOT DETECTED
H CAPSUL AG UR-MCNC: NOT DETECTED NG/ML
HBV SURFACE AB SERPL IA-ACNC: 8.33 M[IU]/ML
HBV SURFACE AB SERPL IA-ACNC: NORMAL M[IU]/ML
HCT VFR BLD AUTO: 20.1 % (ref 35–47)
HCT VFR BLD AUTO: 21.2 % (ref 35–47)
HGB BLD-MCNC: 6.5 G/DL (ref 11.7–15.7)
HGB BLD-MCNC: 6.9 G/DL (ref 11.7–15.7)
INR PPP: 2.52 (ref 0.85–1.15)
ISSUE DATE AND TIME: NORMAL
ISSUE DATE AND TIME: NORMAL
LVEF ECHO: NORMAL
MAGNESIUM SERPL-MCNC: 2.2 MG/DL (ref 1.7–2.3)
MCH RBC QN AUTO: 28 PG (ref 26.5–33)
MCH RBC QN AUTO: 28.3 PG (ref 26.5–33)
MCHC RBC AUTO-ENTMCNC: 32.3 G/DL (ref 31.5–36.5)
MCHC RBC AUTO-ENTMCNC: 32.5 G/DL (ref 31.5–36.5)
MCV RBC AUTO: 87 FL (ref 78–100)
MCV RBC AUTO: 87 FL (ref 78–100)
MICROALBUMIN UR-MCNC: <12 MG/L
MICROALBUMIN/CREAT UR: NORMAL MG/G{CREAT}
OBSERVATION IMP: POSITIVE
PATH REPORT.COMMENTS IMP SPEC: NORMAL
PATH REPORT.FINAL DX SPEC: NORMAL
PATH REPORT.MICROSCOPIC SPEC OTHER STN: NORMAL
PATH REPORT.RELEVANT HX SPEC: NORMAL
PLAT MORPH BLD: ABNORMAL
PLAT MORPH BLD: ABNORMAL
PLATELET # BLD AUTO: 23 10E3/UL (ref 150–450)
PLATELET # BLD AUTO: 24 10E3/UL (ref 150–450)
POTASSIUM SERPL-SCNC: 3.4 MMOL/L (ref 3.4–5.3)
RBC # BLD AUTO: 2.32 10E6/UL (ref 3.8–5.2)
RBC # BLD AUTO: 2.44 10E6/UL (ref 3.8–5.2)
RBC MORPH BLD: ABNORMAL
RBC MORPH BLD: ABNORMAL
SODIUM SERPL-SCNC: 141 MMOL/L (ref 136–145)
UNIT ABO/RH: NORMAL
UNIT ABO/RH: NORMAL
UNIT NUMBER: NORMAL
UNIT NUMBER: NORMAL
UNIT STATUS: NORMAL
UNIT STATUS: NORMAL
UNIT TYPE ISBT: 6200
UNIT TYPE ISBT: 6200
WBC # BLD AUTO: 0.2 10E3/UL (ref 4–11)
WBC # BLD AUTO: 0.2 10E3/UL (ref 4–11)

## 2023-01-31 PROCEDURE — 250N000009 HC RX 250: Performed by: INTERNAL MEDICINE

## 2023-01-31 PROCEDURE — 999N000128 HC STATISTIC PERIPHERAL IV START W/O US GUIDANCE

## 2023-01-31 PROCEDURE — 85610 PROTHROMBIN TIME: CPT | Performed by: HOSPITALIST

## 2023-01-31 PROCEDURE — 99233 SBSQ HOSP IP/OBS HIGH 50: CPT | Performed by: INTERNAL MEDICINE

## 2023-01-31 PROCEDURE — 99233 SBSQ HOSP IP/OBS HIGH 50: CPT | Performed by: SPECIALIST

## 2023-01-31 PROCEDURE — P9016 RBC LEUKOCYTES REDUCED: HCPCS | Performed by: HOSPITALIST

## 2023-01-31 PROCEDURE — 93312 ECHO TRANSESOPHAGEAL: CPT | Mod: 26 | Performed by: INTERNAL MEDICINE

## 2023-01-31 PROCEDURE — 93325 DOPPLER ECHO COLOR FLOW MAPG: CPT | Mod: 26 | Performed by: INTERNAL MEDICINE

## 2023-01-31 PROCEDURE — 250N000013 HC RX MED GY IP 250 OP 250 PS 637: Performed by: HOSPITALIST

## 2023-01-31 PROCEDURE — 93320 DOPPLER ECHO COMPLETE: CPT | Mod: 26 | Performed by: INTERNAL MEDICINE

## 2023-01-31 PROCEDURE — 99232 SBSQ HOSP IP/OBS MODERATE 35: CPT | Performed by: INTERNAL MEDICINE

## 2023-01-31 PROCEDURE — P9037 PLATE PHERES LEUKOREDU IRRAD: HCPCS | Performed by: HOSPITALIST

## 2023-01-31 PROCEDURE — 250N000011 HC RX IP 250 OP 636: Performed by: HOSPITALIST

## 2023-01-31 PROCEDURE — 36415 COLL VENOUS BLD VENIPUNCTURE: CPT | Performed by: HOSPITALIST

## 2023-01-31 PROCEDURE — 250N000011 HC RX IP 250 OP 636: Performed by: INTERNAL MEDICINE

## 2023-01-31 PROCEDURE — 83735 ASSAY OF MAGNESIUM: CPT | Performed by: HOSPITALIST

## 2023-01-31 PROCEDURE — C9113 INJ PANTOPRAZOLE SODIUM, VIA: HCPCS | Performed by: HOSPITALIST

## 2023-01-31 PROCEDURE — 99233 SBSQ HOSP IP/OBS HIGH 50: CPT | Performed by: HOSPITALIST

## 2023-01-31 PROCEDURE — 85027 COMPLETE CBC AUTOMATED: CPT | Performed by: INTERNAL MEDICINE

## 2023-01-31 PROCEDURE — 250N000013 HC RX MED GY IP 250 OP 250 PS 637: Performed by: INTERNAL MEDICINE

## 2023-01-31 PROCEDURE — 93325 DOPPLER ECHO COLOR FLOW MAPG: CPT

## 2023-01-31 PROCEDURE — 999N000184 HC STATISTIC TELEMETRY

## 2023-01-31 PROCEDURE — 85027 COMPLETE CBC AUTOMATED: CPT | Performed by: HOSPITALIST

## 2023-01-31 PROCEDURE — 258N000003 HC RX IP 258 OP 636: Performed by: INTERNAL MEDICINE

## 2023-01-31 PROCEDURE — 80048 BASIC METABOLIC PNL TOTAL CA: CPT | Performed by: HOSPITALIST

## 2023-01-31 PROCEDURE — 999N000183 HC STATISTIC TEE INCLUDES SEDATION

## 2023-01-31 PROCEDURE — 93312 ECHO TRANSESOPHAGEAL: CPT

## 2023-01-31 PROCEDURE — 36415 COLL VENOUS BLD VENIPUNCTURE: CPT | Performed by: INTERNAL MEDICINE

## 2023-01-31 PROCEDURE — 120N000001 HC R&B MED SURG/OB

## 2023-01-31 PROCEDURE — 250N000013 HC RX MED GY IP 250 OP 250 PS 637: Performed by: SPECIALIST

## 2023-01-31 PROCEDURE — 258N000002 HC RX IP 258 OP 250: Performed by: INTERNAL MEDICINE

## 2023-01-31 RX ORDER — GLYCOPYRROLATE 0.2 MG/ML
0.1 INJECTION, SOLUTION INTRAMUSCULAR; INTRAVENOUS ONCE
Status: COMPLETED | OUTPATIENT
Start: 2023-01-31 | End: 2023-01-31

## 2023-01-31 RX ORDER — NALOXONE HYDROCHLORIDE 0.4 MG/ML
0.4 INJECTION, SOLUTION INTRAMUSCULAR; INTRAVENOUS; SUBCUTANEOUS
Status: DISCONTINUED | OUTPATIENT
Start: 2023-01-31 | End: 2023-01-31

## 2023-01-31 RX ORDER — FENTANYL CITRATE 0.05 MG/ML
25 INJECTION, SOLUTION INTRAMUSCULAR; INTRAVENOUS
Status: DISCONTINUED | OUTPATIENT
Start: 2023-01-31 | End: 2023-01-31

## 2023-01-31 RX ORDER — FLUMAZENIL 0.1 MG/ML
0.2 INJECTION, SOLUTION INTRAVENOUS
Status: ACTIVE | OUTPATIENT
Start: 2023-01-31 | End: 2023-02-02

## 2023-01-31 RX ORDER — LIDOCAINE HYDROCHLORIDE 40 MG/ML
1.5 SOLUTION TOPICAL ONCE
Status: COMPLETED | OUTPATIENT
Start: 2023-01-31 | End: 2023-01-31

## 2023-01-31 RX ORDER — FENTANYL CITRATE 50 UG/ML
INJECTION, SOLUTION INTRAMUSCULAR; INTRAVENOUS PRN
Status: COMPLETED | OUTPATIENT
Start: 2023-01-31 | End: 2023-01-31

## 2023-01-31 RX ORDER — LIDOCAINE 50 MG/G
OINTMENT TOPICAL ONCE
Status: COMPLETED | OUTPATIENT
Start: 2023-01-31 | End: 2023-01-31

## 2023-01-31 RX ORDER — SODIUM CHLORIDE 9 MG/ML
INJECTION, SOLUTION INTRAVENOUS CONTINUOUS PRN
Status: DISCONTINUED | OUTPATIENT
Start: 2023-01-31 | End: 2023-01-31

## 2023-01-31 RX ORDER — DEXTROSE MONOHYDRATE 25 G/50ML
9.5 INJECTION, SOLUTION INTRAVENOUS
Status: DISCONTINUED | OUTPATIENT
Start: 2023-01-31 | End: 2023-01-31

## 2023-01-31 RX ORDER — LIDOCAINE 40 MG/G
CREAM TOPICAL
Status: DISCONTINUED | OUTPATIENT
Start: 2023-01-31 | End: 2023-01-31

## 2023-01-31 RX ORDER — DIPHENHYDRAMINE HYDROCHLORIDE AND LIDOCAINE HYDROCHLORIDE AND ALUMINUM HYDROXIDE AND MAGNESIUM HYDRO
10 KIT EVERY 6 HOURS PRN
Status: DISCONTINUED | OUTPATIENT
Start: 2023-01-31 | End: 2023-02-21 | Stop reason: HOSPADM

## 2023-01-31 RX ORDER — HYDROMORPHONE HYDROCHLORIDE 1 MG/ML
0.3 INJECTION, SOLUTION INTRAMUSCULAR; INTRAVENOUS; SUBCUTANEOUS
Status: COMPLETED | OUTPATIENT
Start: 2023-01-31 | End: 2023-01-31

## 2023-01-31 RX ORDER — NYSTATIN 100000/ML
500000 SUSPENSION, ORAL (FINAL DOSE FORM) ORAL 4 TIMES DAILY
Status: DISCONTINUED | OUTPATIENT
Start: 2023-01-31 | End: 2023-02-21 | Stop reason: HOSPADM

## 2023-01-31 RX ORDER — FENTANYL CITRATE 0.05 MG/ML
50 INJECTION, SOLUTION INTRAMUSCULAR; INTRAVENOUS ONCE
Status: DISCONTINUED | OUTPATIENT
Start: 2023-01-31 | End: 2023-01-31

## 2023-01-31 RX ORDER — NALOXONE HYDROCHLORIDE 0.4 MG/ML
0.2 INJECTION, SOLUTION INTRAMUSCULAR; INTRAVENOUS; SUBCUTANEOUS
Status: DISCONTINUED | OUTPATIENT
Start: 2023-01-31 | End: 2023-01-31

## 2023-01-31 RX ADMIN — DIPHENHYDRAMINE HYDROCHLORIDE AND LIDOCAINE HYDROCHLORIDE AND ALUMINUM HYDROXIDE AND MAGNESIUM HYDRO 10 ML: KIT at 23:27

## 2023-01-31 RX ADMIN — HYDROMORPHONE HYDROCHLORIDE 0.3 MG: 1 INJECTION, SOLUTION INTRAMUSCULAR; INTRAVENOUS; SUBCUTANEOUS at 14:15

## 2023-01-31 RX ADMIN — LIDOCAINE HYDROCHLORIDE 1.5 ML: 40 SOLUTION TOPICAL at 15:04

## 2023-01-31 RX ADMIN — GLYCOPYRROLATE 0.1 MG: 0.2 INJECTION, SOLUTION INTRAMUSCULAR; INTRAVENOUS at 14:51

## 2023-01-31 RX ADMIN — CEFEPIME HYDROCHLORIDE 2 G: 2 INJECTION, POWDER, FOR SOLUTION INTRAVENOUS at 12:33

## 2023-01-31 RX ADMIN — MICONAZOLE NITRATE: 2 POWDER TOPICAL at 20:21

## 2023-01-31 RX ADMIN — PANTOPRAZOLE SODIUM 40 MG: 40 INJECTION, POWDER, FOR SOLUTION INTRAVENOUS at 06:33

## 2023-01-31 RX ADMIN — TOPICAL ANESTHETIC 0.5 ML: 200 SPRAY DENTAL; PERIODONTAL at 15:21

## 2023-01-31 RX ADMIN — NYSTATIN 500000 UNITS: 100000 SUSPENSION ORAL at 20:21

## 2023-01-31 RX ADMIN — DIPHENHYDRAMINE HYDROCHLORIDE AND LIDOCAINE HYDROCHLORIDE AND ALUMINUM HYDROXIDE AND MAGNESIUM HYDRO 10 ML: KIT at 17:40

## 2023-01-31 RX ADMIN — SODIUM BICARBONATE: 84 INJECTION, SOLUTION INTRAVENOUS at 07:03

## 2023-01-31 RX ADMIN — PANTOPRAZOLE SODIUM 40 MG: 40 INJECTION, POWDER, FOR SOLUTION INTRAVENOUS at 17:37

## 2023-01-31 RX ADMIN — OXYCODONE HYDROCHLORIDE 2.5 MG: 5 TABLET ORAL at 23:26

## 2023-01-31 RX ADMIN — FILGRASTIM 480 MCG: 480 INJECTION, SOLUTION INTRAVENOUS; SUBCUTANEOUS at 20:21

## 2023-01-31 RX ADMIN — CYANOCOBALAMIN 1000 MCG: 1000 INJECTION, SOLUTION INTRAMUSCULAR at 09:46

## 2023-01-31 RX ADMIN — CEFEPIME HYDROCHLORIDE 2 G: 2 INJECTION, POWDER, FOR SOLUTION INTRAVENOUS at 23:22

## 2023-01-31 RX ADMIN — DOXYCYCLINE HYCLATE 100 MG: 100 CAPSULE ORAL at 20:20

## 2023-01-31 RX ADMIN — MICONAZOLE NITRATE: 2 POWDER TOPICAL at 09:42

## 2023-01-31 RX ADMIN — MIDAZOLAM 3 MG: 1 INJECTION INTRAMUSCULAR; INTRAVENOUS at 15:58

## 2023-01-31 RX ADMIN — FENTANYL CITRATE 100 MCG: 50 INJECTION, SOLUTION INTRAMUSCULAR; INTRAVENOUS at 15:59

## 2023-01-31 RX ADMIN — GABAPENTIN 100 MG: 100 CAPSULE ORAL at 20:20

## 2023-01-31 RX ADMIN — OXYCODONE HYDROCHLORIDE 2.5 MG: 5 TABLET ORAL at 07:22

## 2023-01-31 RX ADMIN — OXYCODONE HYDROCHLORIDE 2.5 MG: 5 TABLET ORAL at 17:39

## 2023-01-31 RX ADMIN — SODIUM CHLORIDE: 9 INJECTION, SOLUTION INTRAVENOUS at 14:51

## 2023-01-31 ASSESSMENT — ACTIVITIES OF DAILY LIVING (ADL)
ADLS_ACUITY_SCORE: 50
ADLS_ACUITY_SCORE: 54
ADLS_ACUITY_SCORE: 54
ADLS_ACUITY_SCORE: 50
ADLS_ACUITY_SCORE: 50
ADLS_ACUITY_SCORE: 54
ADLS_ACUITY_SCORE: 54
ADLS_ACUITY_SCORE: 53
ADLS_ACUITY_SCORE: 54
ADLS_ACUITY_SCORE: 54
ADLS_ACUITY_SCORE: 50
ADLS_ACUITY_SCORE: 53

## 2023-01-31 NOTE — PROGRESS NOTES
[facility]    Infectious Disease Progress Note    Date of Service : 01/31/2023     Assessment:  62 YF with RA on Methotrexate (recently transitioned from subcutaneous dosing to PO), morbid obesity , hx of PEA cardiac arrest due to PE who has been hospitalized with progressive fatigue, was found down and is noted to have severe pancytopenia as well as lactic acidosis related to neutropenic fever. She has significant intertrigo infection in the inframammary and groin folds which is fungal . No bacteremia or other focus of infection found thus far except for mild periumbilical cellulitis. Pancytopenia may be related to Methotrexate toxicity though level is undetectable ( last took a week ago) vs other hematological disorder. May need further bone marrow biopsy     -Pancytopenia ? Methotrexate toxicity vs bone marrow disorder  -Neutropenic fever resolved  -B12/folate deficiency  -Abnormal TTE with concern for vegetation vs dissection, LETA is planned  -Mild periumbilical cellulitis and left ankle erythema  -Fungal intertrigo infection in inframammary and groin skin folds  -KRISS   -Lactic acidosis  -Rheumatoid arthritis - on Methotrexate thrice/week  -Hx of PE with PEA cardiac arrest requiring ECMO  -Chronic left pleural effusion  -Morbid obesity     Recommendations:  1. Await pending diagnostics Ehrlichia/anaplasma PCR, Ricketssial ab, beta d glucan, histo/blast antigen. Other diagnostics negative for infection thus far  2. LETA is planned  3. Received Neupogen and is receiving B12/folic acid  4. Continue Cefepime/doxycyline for now    Scarlett Zhu MD    Interval History   Continues to feel poorly, remains severely pancytopenic, very anemic today getting a blood transfusion, renal functions improving, some periumbilical erythema and erythema around left ankle. Very weak and tired, has a little appetite    Physical Exam   Temp: 98.6  F (37  C) Temp src: Oral BP: 96/62 Pulse: 103   Resp: 20 SpO2: 93 %  O2 Device: None (Room air)    Vitals:    01/29/23 1935   Weight: 129 kg (284 lb 6.3 oz)     Vital Signs with Ranges  Temp:  [97.5  F (36.4  C)-98.9  F (37.2  C)] 98.6  F (37  C)  Pulse:  [103-108] 103  Resp:  [18-20] 20  BP: ()/() 96/62  SpO2:  [93 %-99 %] 93 %    Constitutional: Awake, alert, chronically ill appearing female, morbidly obese  Lungs: Clear to auscultation bilaterally, no crackles or wheezing  Cardiovascular: S1S2, murmur  Abdomen: obese, periumbilical hernia with surrounding cellulitis,   Skin: crusting and intertrigo in skin folds on groin, inframammary and popliteal fossa  MS : L ankle erythema and warmth    Other:    Medications     - MEDICATION INSTRUCTIONS -       - MEDICATION INSTRUCTIONS -       IV infusion builder WITH additives 100 mL/hr at 01/31/23 0703     Warfarin Therapy Reminder         ARIPiprazole  5 mg Oral Daily     ceFEPIme  2 g Intravenous Q12H     cyanocobalamin  1,000 mcg Intramuscular Daily     doxycycline hyclate  100 mg Oral Q12H [facility] (08/20)     DULoxetine  120 mg Oral Daily     filgrastim  480 mcg Subcutaneous Daily at 8 pm     fluconazole  100 mg Oral Daily     folic acid  1 mg Oral Daily     folic acid  2 mg Oral Daily     gabapentin  100 mg Oral TID     miconazole   Topical BID     pantoprazole  40 mg Intravenous Q12H     sodium chloride (PF)  3 mL Intracatheter Q8H     sodium chloride (PF)  3 mL Intracatheter Q8H       Data   All microbiology laboratory data reviewed.  Recent Labs   Lab Test 01/31/23  0838 01/30/23  0237 01/29/23  1827   WBC 0.2* 0.2* 0.2*   HGB 6.5* 7.8* 8.1*   HCT 20.1* 24.0* 24.7*   MCV 87 88 89   PLT 23* 20* 31*     Recent Labs   Lab Test 01/31/23  0757 01/30/23  1217 01/29/23  0626   CR 2.52* 2.65* 2.88*     Recent Labs   Lab Test 01/13/21  1419   SED 15     Component      Latest Ref Rng & Units 1/30/2023   Hep B Surface Agn      Nonreactive Nonreactive   Hepatitis C Antibody      Nonreactive Nonreactive     Component      Latest Ref Rng  & Units 1/29/2023   HIV Antigen Antibody Combo      Nonreactive Nonreactive     Component      Latest Ref Rng & Units 1/29/2023   CMV Caron IgG Instrument Value      <0.60 U/mL <0.20   CMV Antibody IgG      No detectable antibody.  No detectable antibody.   CMV Caron IgM Instrument Value      <30.0 AU/mL <8.0   CMV Antibody IgM      Negative Negative   EBV Caron to Early Ag IgG Instrument Value      0.0 - 9.0 U/mL 15.0 (H)   EBV Antibody to Early Antigen IgG      No detectable antibody. Positive (A)   EBV Capsid Caron IgG Instrument Value      <18.0 U/mL 210.0 (H)   EBV Capsid Antibody IgG      No detectable antibody. Positive (A)   EBV Capsid Acron IgM Instrument Value      <36.0 U/mL <10.0   EBV Capsid Antibody IgM      No detectable antibody. No detectable antibody.   EBV Nuclear Ag Caron IgG Instrument Value      <18.0 U/mL >600.0 (H)   EBV Nuclear Antigen (EBNA) Antibody IgG      No detectable antibody. Positive (A)     Component      Latest Ref Rng & Units 1/29/2023   HSV Type 1 PCR      Negative Negative   HSV Type 2 PCR      Negative Negative   CMV Quant IU/mL      Not Detected IU/mL Not Detected   EBV DNA Copies/mL      Not Detected copies/mL Not Detected        Imaging  1/28 TTE  Interpretation Summary     There is mild to moderate concentric left ventricular hypertrophy.  Left ventricular systolic function is normal.  The right ventricle is mildly dilated.  Mildly decreased right ventricular systolic function  IVC diameter >2.1 cm collapsing <50% with sniff suggests a high RA pressure  estimated at 15 mmHg or greater.  Aortic valve not well seen but it is abnormal. Likely trileaflet with  sclerosis/stenosis  Mild to moderate valvular aortic stenosis.  No aortic regurgitation is present.  ABNORMAL FINDING- the ascending aorta at sinotubular ridge is not well seen  but there is eiither artifact vs possible dissection vs vegetation/flap. REC-  LETA if possible or at least CT aortogram (this echo report called to  silver)  NSR with frequent ectopy  Technically difficult, suboptimal study.  ______________________________________________________________________________    1/28 Ct chest abdomen pelvis  EXAM: CT CHEST ABDOMEN PELVIS W/O CONTRAST  LOCATION: [facility]  DATE/TIME: 1/29/2023 12:32 AM     INDICATION: Sepsis.  COMPARISON: Chest CT from 7/21/2022 and 5/24/2022.  TECHNIQUE: CT scan of the chest, abdomen, and pelvis was performed without IV contrast. Multiplanar reformats were obtained. Dose reduction techniques were used.   CONTRAST: None.     FINDINGS:   LUNGS AND PLEURA: Patchy mostly linear opacities left lower lobe inferiorly favored to be due to some chronic scarring and/or atelectasis and similar to previous. Additional scattered areas of mild subpleural scarring or atelectasis elsewhere in the   periphery of the lungs. No definite acute appearing infiltrates or consolidation. Central airways are clear. Small chronic appearing left pleural effusion is unchanged.     MEDIASTINUM/AXILLAE: Small amount of fluid adjacent to the distal esophagus of uncertain significance. Small hiatal hernia. Scattered small posterior mediastinal lymph nodes probably reactive in nature. Normal heart size. No pericardial effusion. Normal   caliber thoracic aorta. Axillary regions are unremarkable.     CORONARY ARTERY CALCIFICATION: Mild.     HEPATOBILIARY: Liver is negative. Cholelithiasis. No biliary dilatation.     PANCREAS: Normal.     SPLEEN: Normal.     ADRENAL GLANDS: 3 cm right adrenal nodule is technically indeterminate on this study but unchanged compared to 05/24/2022 and compatible with an adrenal adenoma based off an older MRI scan by report. Left adrenal gland is negative.     KIDNEYS/BLADDER: Normal.     BOWEL: Bowel is normal in caliber with no evidence for obstruction. Question some mild edema seen around the second and third portions of the duodenum, nonspecific. Normal appendix. Scattered  colonic diverticuli without evidence for diverticulitis. No   acute bowel findings.     LYMPH NODES: Normal.     VASCULATURE: Unremarkable.     PELVIC ORGANS: Normal.     MUSCULOSKELETAL: Mild to moderate degenerative changes throughout the spine. Right TAWNY. Moderate size fat-containing umbilical hernia.                                                                      IMPRESSION:  1.  Areas of mostly linear scarring and/or atelectasis in the lungs greatest in the left lung base. Nothing definite for acute pneumonitis.     2.  Stable chronic appearing small left pleural effusion.     3.  Small amount of fluid seen adjacent to the distal thoracic esophagus of uncertain significance. Scattered nonspecific posterior mediastinal lymph nodes along the course of the esophagus. These findings are of uncertain significance or etiology.   Correlation for any signs of esophagitis or other inflammatory process.     4.  Cholelithiasis. No biliary dilatation.     5.  Question small amount of edema seen around the second and third portions of the duodenum. Clinical correlation to exclude any signs of a duodenal inflammatory process. Correlation with pancreatic enzymes also suggested to exclude pancreatitis.     6.  3 cm right adrenal nodule is technically indeterminate on this study but unchanged from prior CT scan and reportedly represents a benign adrenal adenoma based off a prior MRI.     7.  Scattered colonic diverticuli without evidence for diverticulitis. Normal appendix

## 2023-01-31 NOTE — PROVIDER NOTIFICATION
MD Notification    Notified Person: MD    Notified Person Name: Dr. Levy    Notification Date/Time: 01/31/23 01/31/23    Notification Interaction: Web Based Paging    Purpose of Notification: FYI Hgb is 6.5    Orders Received: Infuse 1 unit PRBC    Comments:

## 2023-01-31 NOTE — PLAN OF CARE
[id number]  A&Ox4, drowsy. VSS on RA. PRN oxycodone given prior to wound care. L PIV infusing. NaCl 0.45%% w/ NaHCO3 75mEq @ 100mL/hr. R PIV SL'd. C/o sore throat, PRN lozenge ordered, little relief, will continue to monitor. Purewick in place, no BM during shift. Wound care done & interdry changed. T/R Q2H, pt refusing at times. Bariatric bed and pulsate mattress ordered, will arrive in AM. Ax2/Lift. NPO since midnight for planned LETA. Lactic fired came back at 0.6. Mg High replacement, 2.4 recheck in AM. Neutropenic precautions maintained. Discharge plan pending.

## 2023-01-31 NOTE — PROVIDER NOTIFICATION
MD Notification    Notified Person: MD    Notified Person Name: Dr. Levy    Notification Date/Time: 01/31/23 10:39 AM    Notification Interaction: Vocera Messaging     Purpose of Notification: [facility] called, they are unable to do her LETA today, they said they are doing others first, they will push her until tomorrow.     Orders Received:    Comments: Relayed message to Dr. Cam MD paged Dr. Ness with Cardiology, Dr. Ness reached out to [facility]. LETA scheduled for 1530.

## 2023-01-31 NOTE — PROGRESS NOTES
Nephrology Progress Note  01/31/2023         Assessment & Recommendations:   61-year-old female with rheumatoid arthritis, recently started on methotrexate -admitted with severe pancytopenia, neutropenic fever, intertrigo, severe sepsis, KRISS    1 acute renal failure-normal creatinine as of 2021.  Creatinine was up to 1.7 in May 2022, and more recently around 2.1.  Unclear cause of rising creatinine in 2022.  Attributed to dehydration but never really went down.  -Now admitted with a creatinine of 3.2 in setting of sepsis, hypotension, pancytopenia, poor oral intake, use of lisinopril.  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  Urinalysis with multiple hyaline casts and amorphous crystals consistent with prerenal state.  Relatively bland with 5 RBCs per high-power field, 1+ protein and no casts.  -CT abdomen with normal-looking kidneys.  Excellent urine output.    -Creatinine continues to trend down    2 pancytopenia-hematology on board.  Noted to have severe vitamin B12 deficiency and folate deficiency.  May need bone marrow biopsy.  Several labs are pending.  Methotrexate on hold.  Methotrexate level low.  -Status post Neupogen and and vitamin B12/folic acid  -PRBC and platelet transfusion per primary team    3 neutropenic fever-no apparent source of infection.  On cefepime and doxycycline.  -Infectious work-up underway.    4 intertrigo-on fluconazole.    5 metabolic acidosis-with renal failure.  Improving on sodium bicarbonate.  Urine pH appropriate.    Discussion/recommendation-  Encouraged to see improving renal function and good urine output.  Continue to hold lisinopril.  Continue IV fluid at a lower rate of 75 cc an hour  Daily renal panel.      Sarahi Kelley MD  [facility] - Nephrology   [phone]      Interval History :   Seen / examined.   Feels tired.   Reports some pain in the back of her throat.  On fluconazole.  Noted plans for LETA  Received Neupogen.  Receiving B12 and folic  "acid.  Excellent urine output of 2 L.  Creatinine and bicarbonate slowly better on IV fluid with bicarbonate.  Breathing okay    Review of Systems:   A 4 point review of systems was negative except as noted above.  Notably: poor appetite. no nausea or vomiting or diarrhea.  no confusion,  no fever or chills    Physical Exam:   I/O last 3 completed shifts:  In: 264   Out: 1950 [Urine:1950]  /62   Pulse 94   Temp 98.1  F (36.7  C) (Oral)   Resp 20   Ht 1.676 m (5' 6\")   Wt 129 kg (284 lb 6.3 oz)   SpO2 92%   BMI 45.90 kg/m      GENERAL APPEARANCE: alert and no distress  EYES:  no scleral icterus, pupils equal  PULM: lungs clear to auscultation, equal air movement, no cyanosis or clubbing  CV: regular rhythm, normal rate, no rub     -JVP -     -edema +  GI: soft, non tender,   NEURO: mentation intact and speech normal, no asterixis       Labs:   All labs reviewed by me  Electrolytes/Renal -   Recent Labs   Lab Test 01/31/23  1133 01/31/23  0846 01/31/23  0757 01/30/23  2043 01/30/23  1217 01/29/23  0626 10/04/19  1410 10/04/19  0704 10/03/19  2049 10/03/19  0604 10/02/19  0545   NA  --   --  141  --  138 135*   < > 137   < > 139 140   POTASSIUM  --   --  3.4  --  3.6 4.6   < > 4.2   < > 3.8 3.8   CHLORIDE  --   --  111*  --  112* 108*   < > 105   < > 105 106   CO2  --   --  17*  --  14* 12*   < > 26   < > 25 26   BUN  --   --  44.3*  --  47.6* 54.6*   < > 29   < > 22 18   CR  --   --  2.52*  --  2.65* 2.88*   < > 0.93   < > 0.79 0.76   GLC 84 85 86  --  82 73   < > 97   < > 96 94   MARIA INES  --   --  8.1*  --  8.2* 8.4*   < > 9.2   < > 9.1 9.0   MAG  --   --  2.2 2.4* 1.5*  --    < > 2.0   < > 2.1 2.2   PHOS  --   --   --   --   --   --   --  4.2  --  4.3 4.4    < > = values in this interval not displayed.       CBC -   Recent Labs   Lab Test 01/31/23  0838 01/30/23  0237 01/29/23  1827   WBC 0.2* 0.2* 0.2*   HGB 6.5* 7.8* 8.1*   PLT 23* 20* 31*       LFTs -   Recent Labs   Lab Test 01/28/23  7787 " 12/06/22  1049 10/24/22  1138 05/24/22  0959   ALKPHOS 57 65  --  70   BILITOTAL 0.7 0.3  --  0.4   ALT 13 12  --  15   AST 28 12  --  14   PROTTOTAL 6.1* 7.5 8.0 8.2   ALBUMIN 2.5* 3.6  --  3.5       Iron Panel -   Recent Labs   Lab Test 01/29/23  0626 12/18/19  1129 09/24/16  1304   IRON 47 46 71   IRONSAT 40 17  --    RAMÍREZ 1,499* 125 83           Current Medications:    ARIPiprazole  5 mg Oral Daily     ceFEPIme  2 g Intravenous Q12H     cyanocobalamin  1,000 mcg Intramuscular Daily     doxycycline hyclate  100 mg Oral Q12H [facility] (08/20)     DULoxetine  120 mg Oral Daily     filgrastim  480 mcg Subcutaneous Daily at 8 pm     fluconazole  100 mg Oral Daily     folic acid  2 mg Oral Daily     gabapentin  100 mg Oral TID     miconazole   Topical BID     pantoprazole  40 mg Intravenous Q12H     sodium chloride (PF)  3 mL Intracatheter Q8H     sodium chloride (PF)  3 mL Intracatheter Q8H       - MEDICATION INSTRUCTIONS -       - MEDICATION INSTRUCTIONS -       IV infusion builder WITH additives 100 mL/hr at 01/31/23 0703     Warfarin Therapy Reminder       Sarahi Kelley MD

## 2023-01-31 NOTE — PROGRESS NOTES
[facility]    Medicine Progress Note - Hospitalist Service    Date of Admission:  1/28/2023    Assessment & Plan     Shira Rodriguez is a 62-year-old female with rheumatoid arthritis on methotrexate, prior massive PE on warfarin, morbid obesity who presented on 1/28/2023 with severe sepsis.  She was found unresponsive in her home. She  Was hypotensive and  had temp of 100.2 in route to hospital.      Severe sepsis-present on admission  candida intertrigo  Neutropenic fever  Lactic acidosis -Resolved   - With lactic acidosis of 3.2, hypotension, HR of 105, WBC of 0.4, acute metabolic encephalopathy - met criteria for severe sepsis on admission  - CT C/A/P showed small chronic left pleural effusion, small fluid around distal esophagus and duodenum  - she was  Noted to have panniculitis under abdominal pannus, significant redness under her breasts.  Source could be panniculitis, mucositis due to severe neutropenia, duodenitis.  - UA does not suggest UTI, no pneumonia, COVID-19, influenza and RSV negative and   Has a cough which is chronic. Has had severe throat pain for 2 days.  Has diverticulosis but no diverticulitis  - Blood cultures and urine cultures negative so far  - Blood pressure improved with IV fluids and is currently in normal range.    - Lactic acidosis has resolved  -She was started on broad-spectrum antibiotics including cefepime, vancomycin and infectious disease has been consulted and following with oncology and doxycycline was added  -Further work-up has been ordered includingRMSF Ab, ehrlichia and anaplasma serologies and PCRs.Serum fungitell, serum & urine histoplasma Ag and blastomyces Ag.Serum CMV/EBV/HSV PCR and also CMV & EBV serologies; HIV Ab screen.Flow cytometry, EMERY, ANCA anti-GBM Ab, complement levels .  Also if she continues to have worsening back pain then MRI of the lumbar spine will need to be done with and without contrast  -ID on board continue to hold  methotrexate  - continue cefepime for neutropenic fever and continue doxycycline for now though no history to suggest tickborne infection  - May need  need bone marrow biopsy for further evaluation  -continue with Fluconazole  for Candida intertrigo  -We will continue to monitor her closely     Acute metabolic encephalopathy-secondary to severe sepsis-resolved  -Head CT-did not show any acute changes  -On exam she is at baseline     Pancytopenia with severe leukopenia and neutropenia, moderate anemia and thrombocytopenia  Severe vitamin B12 deficiency and folate deficiency  - On 12/7/2022, CBC showed hemoglobin 13.4, WBC 10.6, platelets 271  - On admission, 1/28 - CBC showed Hb 9.4, WBC 0.4, ANC 0.1, platelets 59  - Iron studies showed normal iron saturation, low iron binding capacity indicating anemia of chronic disease.  Reticulocyte count suppressed  - B12 low at < 150 and folate low at 2.8  -elevated D-dimer of 7.38, fibrinogen is 932 and PTT is 42 with INR of 1.99  -Following lab tests are pending- immunoglobulins A, G, M, serum protein immunofixation, kappa and lambda light chain, serum protein electrophoresis, LDH, flow cytometry, serum folate  -I reviewed the note of oncology and given elevated fibrinogen level and a significantly elevated D-dimer argues against DIC  -Methotrexate levels are normal and continue with the B12 supplementation with daily thousand MCG of B12 and folate replacement and her pancytopenia can be explained with severe B12 and folate deficiencies and should correct over a period of time  -As per oncology she is not in DIC with elevated fibrinogen levels  -She also has low platelet count and was givenone unit of platelet on 1/31 and the platelet count is 23 and we will also give her a unit of platelet again today  -Of note her hemoglobin is down to 6.5 and no other evidence of bleeding and we will give her one unit of blood and will also give one unit of platelet  -s/p neupogen and  continue the same       Abnormal echo  -Echo was done which shows EF of 55 to 60%, mild to moderate LVH, mildly decreased right ventricular systolic function, mild to moderate valvular aortic stenosis andthe ascending aorta at sinotubular ridge is not well seen  but there is eiither artifact vs possible dissection vs vegetation/flap. REC-  LETA if possible or at least CT aortogram   -Cardiology was consulted and patient is to undergo LETA and aortic dissection is low on the differential     Acute kidney injury on top of CKD 4- Improving  Mild anion gap metabolic acidosis   - secondary to lactic acidosis and acute kidney injury, pH 7.25 on admission  - Baseline creatinine 2-2.2.  Creatinine on admission 3.25.    -Nephrology has been consulted since admission and she has been started on bicarb drip and her creatinine is being monitored and has improved to 2.52 and her bicarb is improved to 17 and continue with bicarb drip and nephrology is on board and appreciate input     Probable esophagitis  Probable duodenitis  -CT on admission showed small amount of fluid adjacent to distal thoracic esophagus and scattered posterior mediastinal lymph nodes along esophagus of uncertain etiology but could be secondary to esophagitis or other inflammatory process.  CT scan also showed small edema around second and third portion of duodenum.  -Lipase is normal  -On IV Protonix, continue and will need to have EGD when she is more stable      Hypomagnesemia-resolved  -She had magnesium of 1.4 on admission and was started on magnesium replacement and magnesium is normal at 2.2 today     Mild hypovolemic hyponatremia-resolved  -Sodium 132 on admission and was given IV fluids and sodium today is normal at 141     Type II MI due to severe sepsis  -High-sensitivity troponin mildly elevated at 48 and repeat was normal and can be due to demand     Hyperuricemia, uric acid 8.9     History of massive PE causing PEA arrest, 9/2019  -Patient was  "started on warfarin and her INR was 2.80 on 1/30 and her Coumadin dose was held and INR today is 2.52 and given her low platelets and therapeutic range   -we will continue to hold Coumadin for today also and INR is therapeutic     Rheumatoid arthritis  -Had been on methotrexate for some time, resumed about 2 weeks ago due to worsening back and shoulder pain  -Hold methotrexate      3 cm right adrenal nodule  -Noted on CT.  Was present on previous imaging as well.  Likely benign  -A.m. cortisol 1/29, normal at 43.9  -Will need to keep a close eye and follow as outpatient with her primary care physician     COPD  -No acute exacerbation     Chronic major depression  Chronic pain  - see recent pain clinic visit summary from 1/17/2023 for background information  - Continue Abilify, Cymbalta and Neurontin     Morbid obesity, BMI 46     Hypoalbuminemia, albumin 2.5     Panniculitis  Candidal rash-under pannus, breasts  -Wound care on board and continue with miconazole powder     Essential hypertension  -PTA-lisinopril  -Hold lisinopril due to acute kidney injury and hypotension on admission  -We will continue to monitor her blood pressure closely and they have been     Severe Obesity: Estimated body mass index is 45.9 kg/m  as calculated from the following:    Height as of this encounter: 1.676 m (5' 6\").    Weight as of this encounter: 129 kg (284 lb 6.3 oz).    Dysphagia   Mucositis   - she has been complaining of throat pain and on exam of her mouth there are area of ulcer and we will consult speech  - we will also use magic mouth wash   - may need EGD a above but will monitor for now        Diet: NPO per Anesthesia Guidelines for Procedure/Surgery Except for: Meds    DVT Prophylaxis: Warfarin  [place] Catheter: Not present  Lines: None     Cardiac Monitoring: ACTIVE order. Indication: Severe sepsis  Code Status: Full Code      Clinically Significant Risk Factors            # Hypomagnesemia: Lowest Mg = 1.5 mg/dL in last " "2 days, will replace as needed   # Hypoalbuminemia: Lowest albumin = 2.5 g/dL at 1/28/2023 10:21 PM, will monitor as appropriate   # Thrombocytopenia: Lowest platelets = 20 in last 2 days, will monitor for bleeding         # Severe Obesity: Estimated body mass index is 45.9 kg/m  as calculated from the following:    Height as of this encounter: 1.676 m (5' 6\").    Weight as of this encounter: 129 kg (284 lb 6.3 oz)., PRESENT ON ADMISSION         Disposition Plan     Expected Discharge Date: 01/31/2023                  Prem Levy MD  Hospitalist Service  [facility]  Securely message with [employer] (more info)  Text page via vushaper Paging/Directory   ______________________________________________________________________    Interval History     Seen today and reviewed the events of overnight and she was having difficulty swallowing and a consult to speech was placed.  The nurse did discuss about PICC line but we will wait on results of LETA and will also need to discuss with hematology and ID      Have throat pain  She denies any shortness of breath or chest pain    I did call her sister rajiv at 211 pm - [phone] and pt gave permission to talk  And discussed her case in detail     Physical Exam   Vital Signs: Temp: 97.5  F (36.4  C) Temp src: Oral BP: 92/61 Pulse: 103   Resp: 20 SpO2: 93 % O2 Device: None (Room air)    Weight: 284 lbs 6.29 oz        General: Patient appears comfortable and in no acute distress.  Oral : area of ulcer  Respiratory:CTLA  Cardiovascular: Regular rate , S1 and S2 normal with no murmer or rubs or gallops  Abdomen:   soft , non tender , non distended and bowel sound present   Skin: She has significant panniculitis/candidia around the groin, breast folds bilaterally  Neurologic:  No facial droop  Musculoskeletal: Normal Range of motion over upper and lower extremities bilaterally   Psychiatric: cooperative     Medical Decision Making             Time spent in care " of patient is 51minutes and I reviewed the patient basic metabolic panel, CBC,discussed her case in detail with cardiologyand discussed her case with the nursing staff,      Data     I have personally reviewed the following data over the past 24 hrs:    0.2 (LL)  \   6.5 (LL)   / 23 (LL)     141 111 (H) 44.3 (H) /  85   3.4 17 (L) 2.52 (H) \       Procal: N/A CRP: N/A Lactic Acid: 0.6 (L)       INR:  2.52 (H) PTT:  N/A   D-dimer:  N/A Fibrinogen:  N/A       Imaging results reviewed over the past 24 hrs:   Recent Results (from the past 24 hour(s))   Echocardiogram Complete   Result Value    LVEF  55-60%    Narrative    [id number]  [facility]  [id number]  [id number]^MAAME^FEDERICO^OMARI     [facility]  Echocardiography Laboratory  [address] [place] [id number]     Name: XAVIER ROWELL  MRN: [id number]  : 1960  Study Date: 2023 10:46 AM  Age: 62 yrs  Gender: Female  Patient Location: [facility]  Reason For Study: CHF  Ordering Physician: FEDERICO ISABEL  Referring Physician: Anjel Busby  Performed By: Emma Foster     BSA: 2.3 m2  Height: 66 in  Weight: 284 lb  HR: 104  BP: 132/72 mmHg  ______________________________________________________________________________  Procedure  Complete Portable Echo Adult. Optison (NDC #[id number]) given intravenously.  ______________________________________________________________________________  Interpretation Summary     There is mild to moderate concentric left ventricular hypertrophy.  Left ventricular systolic function is normal.  The right ventricle is mildly dilated.  Mildly decreased right ventricular systolic function  IVC diameter >2.1 cm collapsing <50% with sniff suggests a high RA pressure  estimated at 15 mmHg or greater.  Aortic valve not well seen but it is abnormal. Likely trileaflet with  sclerosis/stenosis  Mild to moderate valvular aortic stenosis.  No aortic regurgitation is present.  ABNORMAL FINDING- the ascending  aorta at sinotubular ridge is not well seen  but there is eiither artifact vs possible dissection vs vegetation/flap. REC-  LETA if possible or at least CT aortogram (this echo report called to silver)  NSR with frequent ectopy  Technically difficult, suboptimal study.  ______________________________________________________________________________  Left Ventricle  The left ventricle is normal in size. There is mild to moderate concentric  left ventricular hypertrophy. Left ventricular systolic function is normal.  The visual ejection fraction is 55-60%. Left ventricular diastolic function is  indeterminate. Normal left ventricular wall motion.     Right Ventricle  The right ventricle is mildly dilated. Mildly decreased right ventricular  systolic function.     Atria  The left atrium is borderline dilated. Right atrial size is normal.     Mitral Valve  There is moderate mitral annular calcification.     Tricuspid Valve  No tricuspid regurgitation. Right ventricular systolic pressure could not be  approximated due to inadequate tricuspid regurgitation. IVC diameter >2.1 cm  collapsing <50% with sniff suggests a high RA pressure estimated at 15 mmHg or  greater.     Aortic Valve  The aortic valve is not well visualized. Aortic valve not well seen but it is  abnormal. Likely trileaflet with sclerosis/stenosis. No aortic regurgitation  is present. Mild to moderate valvular aortic stenosis.     Pulmonic Valve  The pulmonic valve is not well seen, but is grossly normal.     Vessels  The aortic root is not well visualized. ABNORMAL FINDING- the ascending aorta  at sinotubular ridge is not well seen but there is eiither artifact vs  possible dissection vs vegetation/flap. REC- LETA if possible or at least CT  aortogram (this echo report called to silver).     Pericardium  The pericardium appears normal.     Rhythm  Sinus rhythm was noted. NSR with frequent  ectopy.  ______________________________________________________________________________  MMode/2D Measurements & Calculations  IVSd: 1.5 cm     LVIDd: 4.9 cm  LVIDs: 3.2 cm  LVPWd: 1.3 cm  FS: 33.9 %  LV mass(C)d: 275.2 grams  LV mass(C)dI: 118.5 grams/m2  Ao root diam: 3.7 cm  LA dimension: 4.5 cm  asc Aorta Diam: 3.3 cm  LA/Ao: 1.2  LVOT diam: 2.4 cm  LVOT area: 4.5 cm2  LA Volume (BP): 70.3 ml  LA Volume Index (BP): 30.3 ml/m2  RWT: 0.53     Doppler Measurements & Calculations  MV E max emile: 77.3 cm/sec  MV A max emile: 99.7 cm/sec  MV E/A: 0.78  MV dec slope: 383.5 cm/sec2  MV dec time: 0.21 sec  Ao V2 max: 333.0 cm/sec  Ao max P.4 mmHg  Ao V2 mean: 236.0 cm/sec  Ao mean P.0 mmHg  Ao V2 VTI: 59.4 cm  MERON(I,D): 1.5 cm2  MERON(V,D): 1.3 cm2  LV V1 max PG: 3.8 mmHg  LV V1 max: 97.7 cm/sec  LV V1 VTI: 20.3 cm  SV(LVOT): 91.8 ml  SI(LVOT): 39.6 ml/m2  PA acc time: 0.10 sec  TR max emile: 250.0 cm/sec  TR max P.0 mmHg  AV Emile Ratio (DI): 0.29  MERON Index (cm2/m2): 0.67  E/E' av.5  Lateral E/e': 11.8  Medial E/e': 11.1     ______________________________________________________________________________  Report approved by: Tariq Doty 2023 01:25 PM

## 2023-01-31 NOTE — PROVIDER NOTIFICATION
MD Notification    Notified Person: MD    Notified Person Name: Alex    Notification Date/Time: 01/30/23 7:55 PM    Notification Interaction: Amcon    Purpose of Notification: 825 S.S. Pt c/o sore throat and requesting PRN.     Orders Received: benzocaine-menthol 6-10 mg lozenge Q1H PRN    Comments:

## 2023-01-31 NOTE — PROGRESS NOTES
Spoke to the floor RN and Dr. Vaughan. We will attempt to schedule LETA for tomorrow depending on schedule. Not an emergent need for today.

## 2023-01-31 NOTE — SEDATION DOCUMENTATION
Report called  to [facility] on [address].  Place for transport at 1700 when q15 vital signs complete.   Monitor.

## 2023-01-31 NOTE — PLAN OF CARE
Goal Outcome Evaluation:         Picked up pt at 1500. Pt A&Ox4, drowsy. VSS on RA.A2 w/lift, refused repositioning. Plts at 20, replaced, recheck set for 1/31 AM. Tele sinus tach. Pt NPO at midnight for LETA 1/31. On Mag protocol, resulted at 1.5, replaced, recheck set for 1/30 at 2100. L PIV infusing 1/2NS w/sodium bicarb at 100mL/hr. R PIV SL. Excoriated skin folds, cares done this AM. Purewick in place, good urine output. ID, nephrology, hem/onc consulted. SW following for discharge needs.

## 2023-01-31 NOTE — PROGRESS NOTES
[facility]  Hematology-Oncology Follow-up Note      Today's Date: 01/31/23  Date of Admission:  1/28/2023  Reason for Consult: Pancytopenia with severe B12 and folate deficiencies      ASSESSMENT:  Shira Rodriguez is a 62 year old female who has been admitted with septic shock and neutropenic fevers    Neutropenic fever   Bacterial sepsis  Acute kidney injury/ acute renal failure  Pancytopenia with severe neutropenia, moderate anemia, with severe B12 and folate deficiency  Morbid obesity    Shira has been admitted with severe sepsis in the setting of pancytopenia. She has severe deficiency in B12 and folic acid. I have started on B12 supplementation 1000 mcg daily subcutaneous. I will start her on folate supplementation yesterday with 5 mg on day 1 and then 2 mg daily. Her pancytopenia can be explained with severe B12 and folate deficiencies. This should correct over time and we will have to support her until then. Agree with extensive work up as per infectious disease team and broad spectrum antibiotics for her severe sepsis at admission.      I have also started her on neupogen for her severe neutropenia. I do not favor bone marrow biopsy at this time due to severe B12 and folate deficiency. Changes from severe B12 and folate deficiency would be identical to myelodysplastic syndrome. If she does not improve at all by next week, I would review the need for bone marrow biopsy. Her flow cytometry did not show any blasts. Her protein electrophoresis shows a very small (0.1 g/dl) M spike of IgM lambda subtype which is not clinically significant. She has elevated kappa and lambda light chains with normal ratio - this is only reflective of renal failure. Se Ig G, A, M levels pending.     Her d-dimer levels and fibrinogen are markedly elevated. These are likely related to the pro-inflammatory state. However d-dimer levels are quite elevated for her. She is not in DIC with elevated fibrinogen  levels which was a concern.   Recent Labs   Lab Test 01/29/23  1829 09/19/19  1549 09/19/19  0337 09/18/19  1557 09/18/19  0346   DD 7.38* >20.0* 19.0* 15.2* 12.5*     She has nose bleeds with ongoing anticoagulation for pulmonary embolism and current platelet counts of 24k. She had a very small bump with platelet transfusion. We would have to review with blood bank for their suggestions on how to improve this.     RECOMMENDATIONS:  - Continue to replace vitamin B12 and folate  -  Methotrexate undetectable  - Fibrinogen elevated and despite very high d-dimer argues against DIC  - Recommend platelet transfusion to keep platelet counts > 50k    She had bilateral life threatening pulmonary embolism with cardiac arrest and now has d-dimer of 7.38  - Continue neupogen for severe neutropenia with sepsis - 480 mcg daily until ANC > 1500  - Check CBC with diff daily  - Patiengt complains of mouth sores. With severe neutropenia and ongoing antibiotics she is at risk for fungal infections. Will start her on nystatin and fluconazole.   - Agree with management as per primary  - bone marrow biopsy only if pancytopenia persistent post correction of B12/ folate deficiencies as marrow would not be interpretable with known severe B12 and folate deficiencies    Over 50 min spent on day of visit including review of tests, obtaining/reviewing separately obtained history/physical exam, counseling patient, ordering medications/tests/procedures, communicating with PCP/consultants, and documenting in electronic medical record.    Julio César Hills  Hematologist and Medical Oncologist  [facility]      INTERIM HISTORY:  Shira was followed today and was alone in her room. She is not feeling well. She has mucositis. Elevated methotrexate could cause this mucositis but it could be candidal infection. She is not feeling well overall and has not improved significantly since her admission.      MEDICATIONS:  Current Facility-Administered  Medications   Medication     acetaminophen (TYLENOL) tablet 650 mg    Or     acetaminophen (TYLENOL) Suppository 650 mg     ARIPiprazole (ABILIFY) tablet 5 mg     benzocaine 20% (HURRICAINE/TOPEX) 20 % spray 0.5-2 mL     benzocaine-menthol (CHLORASEPTIC) 6-10 MG lozenge 1 lozenge     ceFEPIme (MAXIPIME) 2 g vial to attach to  ml bag for ADULTS or 50 ml bag for PEDS     cyanocobalamin injection 1,000 mcg     sodium chloride (PF) 0.9% PF flush 9.5 mL    Or     dextrose 50 % injection 9.5 mL     doxycycline hyclate (VIBRAMYCIN) capsule 100 mg     DULoxetine (CYMBALTA) DR capsule 120 mg     fentaNYL (PF) (SUBLIMAZE) injection 25 mcg     fentaNYL (PF) (SUBLIMAZE) injection 50 mcg     filgrastim (NEUPOGEN) injection syringe SOSY 480 mcg     fluconazole (DIFLUCAN) tablet 100 mg     flumazenil (ROMAZICON) injection 0.2 mg     folic acid (FOLVITE) tablet 2 mg     gabapentin (NEURONTIN) capsule 100 mg     Give   of usual dose of LONG ACTING insulin AM of procedure IF diabetic     HOLD: Insulin - RAPID/SHORT acting AM of procedure IF diabetic     HOLD: Insulin - REGULAR AM of procedure IF diabetic     HOLD: Oral hypoglycemics AM of procedure IF diabetic     ipratropium - albuterol 0.5 mg/2.5 mg/3 mL (DUONEB) neb solution 3 mL     lidocaine (LMX4) cream     lidocaine (LMX4) cream     lidocaine 1 % 0.1-1 mL     lidocaine 1 % 1 mL     magic mouthwash suspension (diphenhydramine, lidocaine, aluminum-magnesium & simethicone)     May take oral meds with a sip of water, the morning of ELTA procedure.     melatonin tablet 3 mg     miconazole (MICATIN) 2 % powder     midazolam (VERSED) injection 1 mg     NaCl 0.45 % 1,000 mL with sodium bicarbonate 75 mEq/L infusion     naloxone (NARCAN) injection 0.2 mg     naloxone (NARCAN) injection 0.2 mg     naloxone (NARCAN) injection 0.2 mg    Or     naloxone (NARCAN) injection 0.4 mg    Or     naloxone (NARCAN) injection 0.2 mg    Or     naloxone (NARCAN) injection 0.4 mg     naloxone  "(NARCAN) injection 0.4 mg     naloxone (NARCAN) injection 0.4 mg     nitroGLYcerin (NITROSTAT) sublingual tablet 0.4 mg     ondansetron (ZOFRAN ODT) ODT tab 4 mg    Or     ondansetron (ZOFRAN) injection 4 mg     oxyCODONE IR (ROXICODONE) half-tab 2.5 mg     pantoprazole (PROTONIX) IV push injection 40 mg     senna-docusate (SENOKOT-S/PERICOLACE) 8.6-50 MG per tablet 1 tablet    Or     senna-docusate (SENOKOT-S/PERICOLACE) 8.6-50 MG per tablet 2 tablet     sodium chloride (PF) 0.9% PF flush 3 mL     sodium chloride (PF) 0.9% PF flush 3 mL     sodium chloride (PF) 0.9% PF flush 3 mL     sodium chloride (PF) 0.9% PF flush 3 mL     sodium chloride (PF) 0.9% PF flush 3 mL     sodium chloride (PF) 0.9% PF flush 3 mL     sodium chloride (PF) 0.9% PF flush 3 mL     sodium chloride 0.9% infusion     WARFARIN THERAPY REMINDER     warfarin-No DOSE today     Facility-Administered Medications Ordered in Other Encounters   Medication     sodium chloride (PF) 0.9% PF flush 10 mL           ALLERGIES:  Allergies   Allergen Reactions     Adhesive Tape Blisters     Erythromycin Rash         PHYSICAL EXAM:  Vital signs:  Temp: 98.1  F (36.7  C) Temp src: Oral BP: 132/60 Pulse: 102   Resp: 18 SpO2: 93 % O2 Device: None (Room air)   Height: 167.6 cm (5' 6\") Weight: 129 kg (284 lb 6.3 oz)  Estimated body mass index is 45.9 kg/m  as calculated from the following:    Height as of this encounter: 1.676 m (5' 6\").    Weight as of this encounter: 129 kg (284 lb 6.3 oz).    LABS:  Recent Labs   Lab Test 01/31/23  1133 01/31/23  0846 01/31/23  0757 01/30/23  1217 01/29/23  0626 01/28/23  2221 12/07/22  1340   NA  --   --  141 138 135* 132* 137   POTASSIUM  --   --  3.4 3.6 4.6 5.2 4.5   CHLORIDE  --   --  111* 112* 108* 103 105   CO2  --   --  17* 14* 12* 13* 14*   ANIONGAP  --   --  13 12 15 16* 18*   BUN  --   --  44.3* 47.6* 54.6* 55.5* 55.0*   CR  --   --  2.52* 2.65* 2.88* 3.25* 2.18*   GLC 84 85 86 82 73 84 90   MARIA INES  --   --  8.1* 8.2* " 8.4* 8.9 9.3     Recent Labs   Lab Test 01/31/23  0757 01/30/23  2043 01/30/23  1217 01/28/23  2221 10/10/19  0831 10/05/19  0521 10/04/19  0704 10/03/19  2049 10/03/19  0604 10/02/19  0545 10/01/19  0548 09/30/19  0625   MAG 2.2 2.4* 1.5* 1.4* 1.9   < > 2.0   < > 2.1 2.2 1.9 2.2   PHOS  --   --   --   --   --   --  4.2  --  4.3 4.4 3.9 4.0    < > = values in this interval not displayed.     Recent Labs   Lab Test 01/31/23  1009 01/31/23  0838 01/30/23  0237 01/29/23 1827 01/29/23  0626 01/28/23 2221 12/07/22  1340 12/06/22  1049 05/24/22  0959 01/13/21  1419 09/24/20  1415   WBC 0.2* 0.2* 0.2* 0.2* 0.3* 0.4* 10.6 15.4* 10.0   < > 6.8   HGB 6.9* 6.5* 7.8* 8.1* 9.2* 9.4* 13.4 13.9 14.9   < > 15.0   PLT 24* 23* 20* 31*  --  59* 271 369 290   < > 146*   MCV 87 87 88 89 89 90 88 89 96   < > 91   NEUTROPHIL  --   --   --   --   --  24 78 86 75  --  77.7    < > = values in this interval not displayed.     Recent Labs   Lab Test 01/29/23 1827 01/28/23 2221 12/06/22  1049 10/24/22  1138 05/24/22  0959 10/06/19  0702 10/05/19  0521   BILITOTAL  --  0.7 0.3  --  0.4   < > 0.4   ALKPHOS  --  57 65  --  70   < > 65   ALT  --  13 12  --  15   < > 18   AST  --  28 12  --  14   < > 12   ALBUMIN  --  2.5* 3.6  --  3.5   < > 2.8*     --   --  300*  --   --  202    < > = values in this interval not displayed.     TSH   Date Value Ref Range Status   12/06/2022 1.60 0.30 - 4.20 uIU/mL Final   05/24/2022 2.23 0.40 - 4.00 mU/L Final   06/16/2021 1.43 0.40 - 4.00 mU/L Final   09/24/2020 1.59 0.40 - 4.00 mU/L Final   06/12/2020 1.49 0.40 - 4.00 mU/L Final     No results for input(s): CEA in the last [id number] hours.  Results for orders placed or performed during the hospital encounter of 01/28/23   XR Chest Port 1 View    Narrative    EXAM: XR CHEST PORT 1 VIEW  LOCATION: [facility]  DATE/TIME: 1/28/2023 11:57 PM    INDICATION: Sepsis.  COMPARISON: 12/06/2022      Impression    IMPRESSION:     No focal  airspace disease. No pleural effusion or pneumothorax.    The cardiomediastinal silhouette is unremarkable. Aortic calcifications.   CT Head w/o Contrast    Narrative    EXAM: CT HEAD W/O CONTRAST  LOCATION: [facility]  DATE/TIME: 1/29/2023 12:33 AM    INDICATION: Delirium and unresponsiveness  COMPARISON:  CT head 09/10/2019.  TECHNIQUE: Routine CT Head without IV contrast. Multiplanar reformats. Dose reduction techniques were used.    FINDINGS:  INTRACRANIAL CONTENTS: No intracranial hemorrhage, extraaxial collection, or mass effect.  No CT evidence of acute infarct. Mild presumed chronic small vessel ischemic changes. Mild generalized volume loss. No hydrocephalus.     VISUALIZED ORBITS/SINUSES/MASTOIDS: No intraorbital abnormality. No significant paranasal sinus mucosal disease. No middle ear or mastoid effusion.    BONES/SOFT TISSUES: No acute abnormality.      Impression    IMPRESSION:  1.  No CT evidence for acute intracranial process.  2.  Brain atrophy and presumed chronic microvascular ischemic changes as above.   CT Chest Abdomen Pelvis w/o Contrast    Narrative    EXAM: CT CHEST ABDOMEN PELVIS W/O CONTRAST  LOCATION: [facility]  DATE/TIME: 1/29/2023 12:32 AM    INDICATION: Sepsis.  COMPARISON: Chest CT from 7/21/2022 and 5/24/2022.  TECHNIQUE: CT scan of the chest, abdomen, and pelvis was performed without IV contrast. Multiplanar reformats were obtained. Dose reduction techniques were used.   CONTRAST: None.    FINDINGS:   LUNGS AND PLEURA: Patchy mostly linear opacities left lower lobe inferiorly favored to be due to some chronic scarring and/or atelectasis and similar to previous. Additional scattered areas of mild subpleural scarring or atelectasis elsewhere in the   periphery of the lungs. No definite acute appearing infiltrates or consolidation. Central airways are clear. Small chronic appearing left pleural effusion is  unchanged.    MEDIASTINUM/AXILLAE: Small amount of fluid adjacent to the distal esophagus of uncertain significance. Small hiatal hernia. Scattered small posterior mediastinal lymph nodes probably reactive in nature. Normal heart size. No pericardial effusion. Normal   caliber thoracic aorta. Axillary regions are unremarkable.    CORONARY ARTERY CALCIFICATION: Mild.    HEPATOBILIARY: Liver is negative. Cholelithiasis. No biliary dilatation.    PANCREAS: Normal.    SPLEEN: Normal.    ADRENAL GLANDS: 3 cm right adrenal nodule is technically indeterminate on this study but unchanged compared to 05/24/2022 and compatible with an adrenal adenoma based off an older MRI scan by report. Left adrenal gland is negative.    KIDNEYS/BLADDER: Normal.    BOWEL: Bowel is normal in caliber with no evidence for obstruction. Question some mild edema seen around the second and third portions of the duodenum, nonspecific. Normal appendix. Scattered colonic diverticuli without evidence for diverticulitis. No   acute bowel findings.    LYMPH NODES: Normal.    VASCULATURE: Unremarkable.    PELVIC ORGANS: Normal.    MUSCULOSKELETAL: Mild to moderate degenerative changes throughout the spine. Right TAWNY. Moderate size fat-containing umbilical hernia.      Impression    IMPRESSION:  1.  Areas of mostly linear scarring and/or atelectasis in the lungs greatest in the left lung base. Nothing definite for acute pneumonitis.    2.  Stable chronic appearing small left pleural effusion.    3.  Small amount of fluid seen adjacent to the distal thoracic esophagus of uncertain significance. Scattered nonspecific posterior mediastinal lymph nodes along the course of the esophagus. These findings are of uncertain significance or etiology.   Correlation for any signs of esophagitis or other inflammatory process.    4.  Cholelithiasis. No biliary dilatation.    5.  Question small amount of edema seen around the second and third portions of the duodenum.  Clinical correlation to exclude any signs of a duodenal inflammatory process. Correlation with pancreatic enzymes also suggested to exclude pancreatitis.    6.  3 cm right adrenal nodule is technically indeterminate on this study but unchanged from prior CT scan and reportedly represents a benign adrenal adenoma based off a prior MRI.    7.  Scattered colonic diverticuli without evidence for diverticulitis. Normal appendix.   Echocardiogram Complete     Value    LVEF  55-60%    Narrative    [id number]  [id number]  [id number]  [id number]^MAAME^FEDERICO^OMARI     [facility]  Echocardiography Laboratory  [address] [place] [address]     Name: XAVIER ROWELL  MRN: [id number]  : 1960  Study Date: 2023 10:46 AM  Age: 62 yrs  Gender: Female  Patient Location: [facility]  Reason For Study: CHF  Ordering Physician: FEDERICO ISABEL  Referring Physician: Anjel Busby  Performed By: Emma Foster     BSA: 2.3 m2  Height: 66 in  Weight: 284 lb  HR: 104  BP: 132/72 mmHg  ______________________________________________________________________________  Procedure  Complete Portable Echo Adult. Optison (NDC #[id number]) given intravenously.  ______________________________________________________________________________  Interpretation Summary     There is mild to moderate concentric left ventricular hypertrophy.  Left ventricular systolic function is normal.  The right ventricle is mildly dilated.  Mildly decreased right ventricular systolic function  IVC diameter >2.1 cm collapsing <50% with sniff suggests a high RA pressure  estimated at 15 mmHg or greater.  Aortic valve not well seen but it is abnormal. Likely trileaflet with  sclerosis/stenosis  Mild to moderate valvular aortic stenosis.  No aortic regurgitation is present.  ABNORMAL FINDING- the ascending aorta at sinotubular ridge is not well seen  but there is eiither artifact vs possible dissection vs vegetation/flap. REC-  LETA if  possible or at least CT aortogram (this echo report called to silver)  NSR with frequent ectopy  Technically difficult, suboptimal study.  ______________________________________________________________________________  Left Ventricle  The left ventricle is normal in size. There is mild to moderate concentric  left ventricular hypertrophy. Left ventricular systolic function is normal.  The visual ejection fraction is 55-60%. Left ventricular diastolic function is  indeterminate. Normal left ventricular wall motion.     Right Ventricle  The right ventricle is mildly dilated. Mildly decreased right ventricular  systolic function.     Atria  The left atrium is borderline dilated. Right atrial size is normal.     Mitral Valve  There is moderate mitral annular calcification.     Tricuspid Valve  No tricuspid regurgitation. Right ventricular systolic pressure could not be  approximated due to inadequate tricuspid regurgitation. IVC diameter >2.1 cm  collapsing <50% with sniff suggests a high RA pressure estimated at 15 mmHg or  greater.     Aortic Valve  The aortic valve is not well visualized. Aortic valve not well seen but it is  abnormal. Likely trileaflet with sclerosis/stenosis. No aortic regurgitation  is present. Mild to moderate valvular aortic stenosis.     Pulmonic Valve  The pulmonic valve is not well seen, but is grossly normal.     Vessels  The aortic root is not well visualized. ABNORMAL FINDING- the ascending aorta  at sinotubular ridge is not well seen but there is eiither artifact vs  possible dissection vs vegetation/flap. REC- LETA if possible or at least CT  aortogram (this echo report called to silver).     Pericardium  The pericardium appears normal.     Rhythm  Sinus rhythm was noted. NSR with frequent ectopy.  ______________________________________________________________________________  MMode/2D Measurements & Calculations  IVSd: 1.5 cm     LVIDd: 4.9 cm  LVIDs: 3.2 cm  LVPWd: 1.3 cm  FS: 33.9 %  LV  mass(C)d: 275.2 grams  LV mass(C)dI: 118.5 grams/m2  Ao root diam: 3.7 cm  LA dimension: 4.5 cm  asc Aorta Diam: 3.3 cm  LA/Ao: 1.2  LVOT diam: 2.4 cm  LVOT area: 4.5 cm2  LA Volume (BP): 70.3 ml  LA Volume Index (BP): 30.3 ml/m2  RWT: 0.53     Doppler Measurements & Calculations  MV E max emile: 77.3 cm/sec  MV A max emile: 99.7 cm/sec  MV E/A: 0.78  MV dec slope: 383.5 cm/sec2  MV dec time: 0.21 sec  Ao V2 max: 333.0 cm/sec  Ao max P.4 mmHg  Ao V2 mean: 236.0 cm/sec  Ao mean P.0 mmHg  Ao V2 VTI: 59.4 cm  MERON(I,D): 1.5 cm2  MERON(V,D): 1.3 cm2  LV V1 max PG: 3.8 mmHg  LV V1 max: 97.7 cm/sec  LV V1 VTI: 20.3 cm  SV(LVOT): 91.8 ml  SI(LVOT): 39.6 ml/m2  PA acc time: 0.10 sec  TR max emile: 250.0 cm/sec  TR max P.0 mmHg  AV Emile Ratio (DI): 0.29  MERON Index (cm2/m2): 0.67  E/E' av.5  Lateral E/e': 11.8  Medial E/e': 11.1     ______________________________________________________________________________  Report approved by: Tariq Doty 2023 01:25 PM           *Note: Due to a large number of results and/or encounters for the requested time period, some results have not been displayed. A complete set of results can be found in Results Review.

## 2023-01-31 NOTE — PROGRESS NOTES
[facility] Cardiology Progress Note          Assessment and Plan:   1. Sepsis   2. Neutropenic fever  3. History of massive PE  4. Probable mobile mass in the aortic root    -Patient underwent transesophageal echocardiogram this afternoon to further elucidate the abnormality seen on the transthoracic echocardiogram. The LETA showed no intracardiac mass or thrombus. No aortic valve vegetation.  No aortic dissection. There is evidence of atherosclerotic plaque in the ascending aorta there fore recommend statin therapy. No further cardiac evaluation needed         Interval History:   No cardiac issues overnight       Review of Systems:   NEGATIVE FOR CHEST PAIN        Medications:     No current outpatient medications on file.               Physical Exam:   All vitals have been reviewed  Patient Vitals for the past 24 hrs:   BP Temp Temp src Pulse Resp SpO2   01/31/23 1441 132/60 -- -- 102 18 93 %   01/31/23 1304 117/62 98.1  F (36.7  C) Oral 94 20 92 %   01/31/23 1238 112/63 97.9  F (36.6  C) Oral 100 20 97 %   01/31/23 1158 107/64 98.1  F (36.7  C) Oral 103 20 95 %   01/31/23 1043 99/58 98  F (36.7  C) Oral 102 20 94 %   01/31/23 0952 96/60 98.3  F (36.8  C) Oral -- 20 93 %   01/31/23 0933 96/62 98.6  F (37  C) -- 103 20 94 %   01/31/23 0754 92/61 97.5  F (36.4  C) Oral 103 20 93 %   01/31/23 0130 112/65 98.2  F (36.8  C) Oral 104 -- 97 %   01/30/23 1938 110/63 98.6  F (37  C) Oral 108 -- 97 %   01/30/23 1858 (!) 130/103 97.8  F (36.6  C) Oral 104 20 96 %   01/30/23 1705 119/75 98.9  F (37.2  C) Oral 104 20 97 %   01/30/23 1637 118/75 98  F (36.7  C) Oral 106 20 98 %       Intake/Output Summary (Last 24 hours) at 1/31/2023 1532  Last data filed at 1/31/2023 1304  Gross per 24 hour   Intake 264 ml   Output 1950 ml   Net -1686 ml     Gen: NAD  Lung: Clear anteriorly  CV: RRR, no murmurs  Abd: S, nt   Ext: 1+ edema           Data:   All laboratory data reviewed  ROUTINE IP LABS (Last four results)  BMP  Recent  Labs   Lab 01/31/23  1133 01/31/23  0846 01/31/23  0757 01/30/23  1217 01/29/23  0626 01/28/23  2221   NA  --   --  141 138 135* 132*   POTASSIUM  --   --  3.4 3.6 4.6 5.2   CHLORIDE  --   --  111* 112* 108* 103   MARIA INES  --   --  8.1* 8.2* 8.4* 8.9   CO2  --   --  17* 14* 12* 13*   BUN  --   --  44.3* 47.6* 54.6* 55.5*   CR  --   --  2.52* 2.65* 2.88* 3.25*   GLC 84 85 86 82 73 84     CBC  Recent Labs   Lab 01/31/23  1009 01/31/23  0838 01/30/23  0237 01/29/23  1827   WBC 0.2* 0.2* 0.2* 0.2*   RBC 2.44* 2.32* 2.72* 2.78*   HGB 6.9* 6.5* 7.8* 8.1*   HCT 21.2* 20.1* 24.0* 24.7*   MCV 87 87 88 89   MCH 28.3 28.0 28.7 29.1   MCHC 32.5 32.3 32.5 32.8   RDW 14.1 14.3 14.0 13.8   PLT 24* 23* 20* 31*     INR  Recent Labs   Lab 01/31/23  0757 01/30/23  1217 01/28/23  2221   INR 2.52* 2.80* 1.99*                  Attestation:  I have reviewed today's vital signs, notes, medications, labs and imaging.  Amount of time performed on this hospital visit: 40 minutes.     Reno Ness MD

## 2023-02-01 ENCOUNTER — APPOINTMENT (OUTPATIENT)
Dept: SPEECH THERAPY | Facility: CLINIC | Age: 63
DRG: 871 | End: 2023-02-01
Attending: HOSPITALIST
Payer: COMMERCIAL

## 2023-02-01 ENCOUNTER — APPOINTMENT (OUTPATIENT)
Dept: OCCUPATIONAL THERAPY | Facility: CLINIC | Age: 63
DRG: 871 | End: 2023-02-01
Attending: HOSPITALIST
Payer: COMMERCIAL

## 2023-02-01 LAB
ACANTHOCYTES BLD QL SMEAR: SLIGHT
ANION GAP SERPL CALCULATED.3IONS-SCNC: 10 MMOL/L (ref 7–15)
BLD PROD TYP BPU: NORMAL
BLOOD COMPONENT TYPE: NORMAL
BUN SERPL-MCNC: 43.9 MG/DL (ref 8–23)
CALCIUM SERPL-MCNC: 8 MG/DL (ref 8.8–10.2)
CHLORIDE SERPL-SCNC: 114 MMOL/L (ref 98–107)
CHOLEST SERPL-MCNC: 84 MG/DL
CODING SYSTEM: NORMAL
CREAT SERPL-MCNC: 2.41 MG/DL (ref 0.51–0.95)
DEPRECATED HCO3 PLAS-SCNC: 20 MMOL/L (ref 22–29)
DSDNA AB SER-ACNC: 8 IU/ML
ELLIPTOCYTES BLD QL SMEAR: SLIGHT
ERYTHROCYTE [DISTWIDTH] IN BLOOD BY AUTOMATED COUNT: 14.6 % (ref 10–15)
GFR SERPL CREATININE-BSD FRML MDRD: 22 ML/MIN/1.73M2
GLUCOSE SERPL-MCNC: 91 MG/DL (ref 70–99)
HCT VFR BLD AUTO: 22 % (ref 35–47)
HDLC SERPL-MCNC: 24 MG/DL
HGB BLD-MCNC: 7.2 G/DL (ref 11.7–15.7)
INR PPP: 2.74 (ref 0.85–1.15)
ISSUE DATE AND TIME: NORMAL
LDLC SERPL CALC-MCNC: 45 MG/DL
MAGNESIUM SERPL-MCNC: 2 MG/DL (ref 1.7–2.3)
MCH RBC QN AUTO: 28.6 PG (ref 26.5–33)
MCHC RBC AUTO-ENTMCNC: 32.7 G/DL (ref 31.5–36.5)
MCV RBC AUTO: 87 FL (ref 78–100)
NONHDLC SERPL-MCNC: 60 MG/DL
PLAT MORPH BLD: ABNORMAL
PLATELET # BLD AUTO: 25 10E3/UL (ref 150–450)
POTASSIUM SERPL-SCNC: 3.4 MMOL/L (ref 3.4–5.3)
RBC # BLD AUTO: 2.52 10E6/UL (ref 3.8–5.2)
RBC MORPH BLD: ABNORMAL
SCANNED LAB RESULT: NORMAL
SODIUM SERPL-SCNC: 144 MMOL/L (ref 136–145)
TRIGL SERPL-MCNC: 76 MG/DL
UNIT ABO/RH: NORMAL
UNIT NUMBER: NORMAL
UNIT STATUS: NORMAL
UNIT TYPE ISBT: 6200
WBC # BLD AUTO: 0.3 10E3/UL (ref 4–11)

## 2023-02-01 PROCEDURE — 99233 SBSQ HOSP IP/OBS HIGH 50: CPT | Performed by: HOSPITALIST

## 2023-02-01 PROCEDURE — 92526 ORAL FUNCTION THERAPY: CPT | Mod: GN | Performed by: SPEECH-LANGUAGE PATHOLOGIST

## 2023-02-01 PROCEDURE — 99233 SBSQ HOSP IP/OBS HIGH 50: CPT | Performed by: INTERNAL MEDICINE

## 2023-02-01 PROCEDURE — 97535 SELF CARE MNGMENT TRAINING: CPT | Mod: GO

## 2023-02-01 PROCEDURE — 250N000011 HC RX IP 250 OP 636: Performed by: INTERNAL MEDICINE

## 2023-02-01 PROCEDURE — 36415 COLL VENOUS BLD VENIPUNCTURE: CPT | Performed by: HOSPITALIST

## 2023-02-01 PROCEDURE — P9035 PLATELET PHERES LEUKOREDUCED: HCPCS | Performed by: HOSPITALIST

## 2023-02-01 PROCEDURE — P9035 PLATELET PHERES LEUKOREDUCED: HCPCS

## 2023-02-01 PROCEDURE — 83735 ASSAY OF MAGNESIUM: CPT | Performed by: HOSPITALIST

## 2023-02-01 PROCEDURE — 85610 PROTHROMBIN TIME: CPT | Performed by: HOSPITALIST

## 2023-02-01 PROCEDURE — 250N000013 HC RX MED GY IP 250 OP 250 PS 637: Performed by: NURSE PRACTITIONER

## 2023-02-01 PROCEDURE — 250N000013 HC RX MED GY IP 250 OP 250 PS 637: Performed by: HOSPITALIST

## 2023-02-01 PROCEDURE — 250N000013 HC RX MED GY IP 250 OP 250 PS 637: Performed by: INTERNAL MEDICINE

## 2023-02-01 PROCEDURE — 80048 BASIC METABOLIC PNL TOTAL CA: CPT | Performed by: HOSPITALIST

## 2023-02-01 PROCEDURE — 250N000009 HC RX 250: Performed by: INTERNAL MEDICINE

## 2023-02-01 PROCEDURE — 258N000002 HC RX IP 258 OP 250: Performed by: INTERNAL MEDICINE

## 2023-02-01 PROCEDURE — 85027 COMPLETE CBC AUTOMATED: CPT | Performed by: HOSPITALIST

## 2023-02-01 PROCEDURE — 99233 SBSQ HOSP IP/OBS HIGH 50: CPT | Performed by: SPECIALIST

## 2023-02-01 PROCEDURE — 250N000011 HC RX IP 250 OP 636: Performed by: HOSPITALIST

## 2023-02-01 PROCEDURE — 99231 SBSQ HOSP IP/OBS SF/LOW 25: CPT | Performed by: INTERNAL MEDICINE

## 2023-02-01 PROCEDURE — 92610 EVALUATE SWALLOWING FUNCTION: CPT | Mod: GN | Performed by: SPEECH-LANGUAGE PATHOLOGIST

## 2023-02-01 PROCEDURE — 80061 LIPID PANEL: CPT | Performed by: NURSE PRACTITIONER

## 2023-02-01 PROCEDURE — 120N000001 HC R&B MED SURG/OB

## 2023-02-01 PROCEDURE — C9113 INJ PANTOPRAZOLE SODIUM, VIA: HCPCS | Performed by: HOSPITALIST

## 2023-02-01 PROCEDURE — 97165 OT EVAL LOW COMPLEX 30 MIN: CPT | Mod: GO

## 2023-02-01 PROCEDURE — 250N000013 HC RX MED GY IP 250 OP 250 PS 637: Performed by: SPECIALIST

## 2023-02-01 RX ORDER — DOXYCYCLINE 100 MG/1
100 CAPSULE ORAL EVERY 12 HOURS SCHEDULED
Status: COMPLETED | OUTPATIENT
Start: 2023-02-01 | End: 2023-02-03

## 2023-02-01 RX ORDER — MAGNESIUM SULFATE HEPTAHYDRATE 40 MG/ML
2 INJECTION, SOLUTION INTRAVENOUS ONCE
Status: COMPLETED | OUTPATIENT
Start: 2023-02-01 | End: 2023-02-01

## 2023-02-01 RX ORDER — LEUCOVORIN CALCIUM 350 MG/17.5ML
1000 INJECTION, POWDER, LYOPHILIZED, FOR SOLUTION INTRAMUSCULAR; INTRAVENOUS EVERY 6 HOURS
Status: DISCONTINUED | OUTPATIENT
Start: 2023-02-02 | End: 2023-02-02

## 2023-02-01 RX ORDER — ATORVASTATIN CALCIUM 40 MG/1
40 TABLET, FILM COATED ORAL EVERY EVENING
Status: DISCONTINUED | OUTPATIENT
Start: 2023-02-01 | End: 2023-02-21 | Stop reason: HOSPADM

## 2023-02-01 RX ADMIN — DOXYCYCLINE HYCLATE 100 MG: 100 CAPSULE ORAL at 08:56

## 2023-02-01 RX ADMIN — MAGNESIUM SULFATE HEPTAHYDRATE 2 G: 2 INJECTION, SOLUTION INTRAVENOUS at 10:34

## 2023-02-01 RX ADMIN — DIPHENHYDRAMINE HYDROCHLORIDE AND LIDOCAINE HYDROCHLORIDE AND ALUMINUM HYDROXIDE AND MAGNESIUM HYDRO 10 ML: KIT at 22:02

## 2023-02-01 RX ADMIN — ATORVASTATIN CALCIUM 40 MG: 40 TABLET, FILM COATED ORAL at 20:45

## 2023-02-01 RX ADMIN — NYSTATIN 500000 UNITS: 100000 SUSPENSION ORAL at 22:38

## 2023-02-01 RX ADMIN — SODIUM BICARBONATE: 84 INJECTION, SOLUTION INTRAVENOUS at 15:06

## 2023-02-01 RX ADMIN — PANTOPRAZOLE SODIUM 40 MG: 40 INJECTION, POWDER, FOR SOLUTION INTRAVENOUS at 17:43

## 2023-02-01 RX ADMIN — FOLIC ACID 2 MG: 1 TABLET ORAL at 08:56

## 2023-02-01 RX ADMIN — GABAPENTIN 100 MG: 100 CAPSULE ORAL at 20:45

## 2023-02-01 RX ADMIN — NYSTATIN 500000 UNITS: 100000 SUSPENSION ORAL at 15:05

## 2023-02-01 RX ADMIN — GABAPENTIN 100 MG: 100 CAPSULE ORAL at 15:05

## 2023-02-01 RX ADMIN — MICONAZOLE NITRATE: 2 POWDER TOPICAL at 20:48

## 2023-02-01 RX ADMIN — DIPHENHYDRAMINE HYDROCHLORIDE AND LIDOCAINE HYDROCHLORIDE AND ALUMINUM HYDROXIDE AND MAGNESIUM HYDRO 10 ML: KIT at 06:13

## 2023-02-01 RX ADMIN — GABAPENTIN 100 MG: 100 CAPSULE ORAL at 08:56

## 2023-02-01 RX ADMIN — CEFEPIME HYDROCHLORIDE 2 G: 2 INJECTION, POWDER, FOR SOLUTION INTRAVENOUS at 11:25

## 2023-02-01 RX ADMIN — SENNOSIDES AND DOCUSATE SODIUM 1 TABLET: 50; 8.6 TABLET ORAL at 15:06

## 2023-02-01 RX ADMIN — PANTOPRAZOLE SODIUM 40 MG: 40 INJECTION, POWDER, FOR SOLUTION INTRAVENOUS at 06:09

## 2023-02-01 RX ADMIN — DIPHENHYDRAMINE HYDROCHLORIDE AND LIDOCAINE HYDROCHLORIDE AND ALUMINUM HYDROXIDE AND MAGNESIUM HYDRO 10 ML: KIT at 15:06

## 2023-02-01 RX ADMIN — OXYCODONE HYDROCHLORIDE 2.5 MG: 5 TABLET ORAL at 20:45

## 2023-02-01 RX ADMIN — OXYCODONE HYDROCHLORIDE 2.5 MG: 5 TABLET ORAL at 09:05

## 2023-02-01 RX ADMIN — DOXYCYCLINE HYCLATE 100 MG: 100 CAPSULE ORAL at 20:45

## 2023-02-01 RX ADMIN — NYSTATIN 500000 UNITS: 100000 SUSPENSION ORAL at 17:43

## 2023-02-01 RX ADMIN — ACETAMINOPHEN 650 MG: 325 TABLET, FILM COATED ORAL at 11:51

## 2023-02-01 RX ADMIN — ACETAMINOPHEN 650 MG: 325 TABLET, FILM COATED ORAL at 20:45

## 2023-02-01 RX ADMIN — ARIPIPRAZOLE 5 MG: 5 TABLET ORAL at 08:56

## 2023-02-01 RX ADMIN — FLUCONAZOLE 100 MG: 100 TABLET ORAL at 08:56

## 2023-02-01 RX ADMIN — FILGRASTIM 480 MCG: 480 INJECTION, SOLUTION INTRAVENOUS; SUBCUTANEOUS at 20:48

## 2023-02-01 RX ADMIN — SODIUM BICARBONATE: 84 INJECTION, SOLUTION INTRAVENOUS at 00:32

## 2023-02-01 RX ADMIN — NYSTATIN 500000 UNITS: 100000 SUSPENSION ORAL at 08:56

## 2023-02-01 RX ADMIN — DULOXETINE HYDROCHLORIDE 120 MG: 60 CAPSULE, DELAYED RELEASE ORAL at 08:55

## 2023-02-01 RX ADMIN — OXYCODONE HYDROCHLORIDE 2.5 MG: 5 TABLET ORAL at 15:05

## 2023-02-01 RX ADMIN — CYANOCOBALAMIN 1000 MCG: 1000 INJECTION, SOLUTION INTRAMUSCULAR at 08:56

## 2023-02-01 RX ADMIN — MICONAZOLE NITRATE: 2 POWDER TOPICAL at 08:58

## 2023-02-01 ASSESSMENT — ACTIVITIES OF DAILY LIVING (ADL)
ADLS_ACUITY_SCORE: 50
PREVIOUS_RESPONSIBILITIES: MEDICATION MANAGEMENT;FINANCES
ADLS_ACUITY_SCORE: 50
ADLS_ACUITY_SCORE: 56
ADLS_ACUITY_SCORE: 54
ADLS_ACUITY_SCORE: 50
ADLS_ACUITY_SCORE: 54
ADLS_ACUITY_SCORE: 54

## 2023-02-01 NOTE — PROGRESS NOTES
"   02/01/23 1500   Appointment Info   Signing Clinician's Name / Credentials (OT) Tonie Rivas, OTR/L   Living Environment   People in Home alone   Current Living Arrangements apartment   Home Accessibility no concerns   Transportation Anticipated family or friend will provide   Living Environment Comments Pt resides alone in apartment, uses elevator. Pt occasionally uses a 4WW (no seat/no brakes - pt reports she put second set of wheels on it). BR SET UP: tub/shower combo with shower chair, grab bars; standard toilet with RTS.   Self-Care   Usual Activity Tolerance moderate   Regular Exercise No   Equipment Currently Used at Home walker, rolling;cane, straight;raised toilet seat;shower chair   Fall history within last six months yes   Number of times patient has fallen within last six months 1  (Pt reports bending over and not able to sit back up)   Activity/Exercise/Self-Care Comment Pt ind all ADLs - pt reports some difficulty with getting in/out of tub/shower combo.   Instrumental Activities of Daily Living (IADL)   Previous Responsibilities medication management;finances   IADL Comments Pt does light cooking/microwave meals; Pt reports assist with groceries/laundry/cleaning - 4 hours once per week. Pt reports she drives but not recently/within past month.   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  She was found unresponsive in her home. She  Was hypotensive and  had temp of 100.2 in route to hospital.\"   Existing Precautions/Restrictions fall   General Observations and Info Several wounds   Cognitive Status Examination "   Follows Commands follows one-step commands;75-90% accuracy   Visual Perception   Visual Impairment/Limitations WFL   Sensory   Sensory Comments Pt reports no numbness/tingling in BUEs/BLEs   Pain Assessment   Patient Currently in Pain   (7/10 post-activity in areas of where wounds are located)   Range of Motion Comprehensive   Comment, General Range of Motion BUEs WFL, limited shoulder flexion/extension, elbow flexion/extension WFL   Strength Comprehensive (MMT)   Comment, General Manual Muscle Testing (MMT) Assessment Global weakness, At least 3/5 in B shoulders flexion/extension to 90 degrees, elbow flexion/extension WFL,  strength intact   Coordination   Upper Extremity Coordination No deficits were identified   Bed Mobility   Bed Mobility rolling left;rolling right   Comment (Bed Mobility) Max A x 2   Transfers   Transfer Comments Not tested   Activities of Daily Living   BADL Assessment/Intervention lower body dressing   Lower Body Dressing Assessment/Training   Comment, (Lower Body Dressing) Dep A to doff/don B socks   Clinical Impression   Criteria for Skilled Therapeutic Interventions Met (OT) Yes, treatment indicated   OT Diagnosis Decreased ind with I/ADLs   OT Problem List-Impairments impacting ADL problems related to;activity tolerance impaired;balance;mobility;strength;pain   Assessment of Occupational Performance 1-3 Performance Deficits   Identified Performance Deficits TB Dressing, toilet transfers, tub/shower combo transfers, taxing I/ADLs   Planned Therapy Interventions (OT) ADL retraining;IADL retraining;transfer training   Clinical Decision Making Complexity (OT) low complexity   Anticipated Equipment Needs Upon Discharge (OT)   (TBD)   Risk & Benefits of therapy have been explained patient   OT Total Evaluation Time   OT Eval, Low Complexity Minutes (48096) 10   OT Goals   Therapy Frequency (OT) 4 times/wk   OT Predicted Duration/Target Date for Goal Attainment 02/15/23   OT Goals  Hygiene/Grooming;Upper Body Dressing;Bed Mobility;Cognition   OT: Hygiene/Grooming supervision/stand-by assist  (Chair level)   OT: Upper Body Dressing Supervision/stand-by assist  (Chair level)   OT: Bed Mobility Minimal assist   OT: Cognitive Patient/caregiver will verbalize understanding of cognitive assessment results/recommendations as needed for safe discharge planning  (As needed)   Self-Care/Home Management   Self-Care/Home Mgmt/ADL, Compensatory, Meal Prep Minutes (62142) 20   Symptoms Noted During/After Treatment (Meal Preparation/Planning Training) fatigue;increased pain   Treatment Detail/Skilled Intervention Pt greeted in supine, agreeable to OT. Pt   OT Discharge Planning   OT Plan Monitor cog - screen as needed; bed mobility; bed level exercises; Lift to sheila-chair (check with RN if this is in room)   OT Discharge Recommendation (DC Rec) Transitional Care Facility;Long term care facility   OT Rationale for DC Rec Pt functioning below reported level, limited by decreased strength, functional mobility, increased pain d/t wounds, decreased activity tolerance, and ADLs, impacting overall independence. Pt resides alone in an apartment - per chart, pt found on floor/unresponsive, indicating high fall risk. Pt currently A x2 for bed mobility. Pt would benefit from skilled TCU to address strengthening/ADLs prior to return home.   OT Brief overview of current status Max A x 2 for bed mobility, global weakness and pain   Total Session Time   Timed Code Treatment Minutes 20   Total Session Time (sum of timed and untimed services) 30

## 2023-02-01 NOTE — PROGRESS NOTES
CLINICAL SWALLOW EVALUATION       02/01/23 0973   Appointment Info   Signing Clinician's Name / Credentials (SLP) Brandee Cameron M.A., CCC-SLP, [facility]   General Information   Onset of Illness/Injury or Date of Surgery 01/29/23   Referring Physician Dr. Levy   Patient/Family Therapy Goal Statement (SLP) Patient would like to eat, but it is painful and she is scared of choking.   Pertinent History of Current Problem Patient admitted with sepsis, pancytopenia, and KRISS.  Her PMH is significant for RA, hx of PEA arrest due to massive PE.  Patient endorses long term hx of productive coughing, but does not associate that with eating usually.  She endorses new pain with swallowing due to mouth sores and occasional feeling of coughing and choking being new.  She had trouble swallowing her meds yesterday.   General Observations Patient alert, weak.  Positioned upright in bed.   Type of Evaluation   Type of Evaluation Swallow Evaluation   Oral Motor   Oral Musculature anomalies present   Structural Abnormalities present   Mucosal Quality dry;ulcerated  (Bilateral tongue ulceration with mucositis)   Dentition (Oral Motor)   Dentition (Oral Motor) natural dentition   Facial Symmetry (Oral Motor)   Facial Symmetry (Oral Motor) WNL   Cough/Swallow/Gag Reflex (Oral Motor)   Volitional Throat Clear/Cough (Oral Motor) reduced strength   Volitional Swallow (Oral Motor) mildly delayed;effortful;weak   Comment, Cough/Swallow/Gag Reflex (Oral Motor) Diffuse generalized weakness, delayed volitional swallow likely related to both weakness and pain with mucositis.   Vocal Quality/Secretion Management (Oral Motor)   Vocal Quality (Oral Motor) WNL   Secretion Management (Oral Motor) WNL   General Swallowing Observations   Past History of Dysphagia See above   Comment, Secretions/Suctioning Active nosebleed, blood clot in left nare   Respiratory Support (General Swallowing Observations) none   Current Diet/Method of Nutritional Intake  (General Swallowing Observations, NIS) thin liquids (level 0);regular diet   Swallowing Evaluation Clinical swallow evaluation   Clinical Swallow Evaluation   Feeding Assistance minimal assistance required   Additional evaluation(s) completed today Recommended;Yes   Rationale for completing additional evaluation No currently appropriate due to low HGB and platelet concerns, pain with mucositis may be a barrier to swallowing barium at this time.   Clinical Swallow Evaluation Textures Trialed thin liquids;mildly thick liquids;pureed;minced & moist;solid foods   Clinical Swallow Eval: Thin Liquid Texture Trial   Mode of Presentation, Thin Liquids cup;straw;spoon;self-fed;fed by clinician   Volume of Liquid or Food Presented 4 oz   Oral Phase of Swallow premature pharyngeal entry;delayed AP movement   Pharyngeal Phase of Swallow impaired;coughing/choking;repeated swallows   Diagnostic Statement Ok for ice chips, overt Sx of aspiration with thin by cup and straw both.   Clinical Swallow Eval: Mildly Thick Liquids   Mode of Presentation spoon;cup;self-fed   Volume Presented 10 oz   Oral Phase WFL   Pharyngeal Phase impaired;coughing/choking   Diagnostic Statement Cough x1/20 trials, productive   Clinical Swallow Evaluation: Puree Solid Texture Trial   Mode of Presentation, Puree spoon;self-fed;fed by clinician   Volume of Puree Presented 3 oz   Oral Phase, Puree delayed AP movement;effortful AP movement   Pharyngeal Phase, Puree intact   Diagnostic Statement Bolus holding, ? pain related, no overt Sx of aspiration   Clinical Swallow Eval: Minced & Moist   Mode of Presentation spoon;self-fed   Volume Presented 1 tsp   Oral Phase delayed AP movement;effortful AP movement   Pharyngeal Phase intact   Diagnostic Statement Minimal bolus holding and slow mastication   Clinical Swallow Evaluation: Solid Food Texture Trial   Mode of Presentation self-fed   Volume Presented 1/2 cookie   Oral Phase impaired mastication;delayed AP  movement;effortful AP movement  (Pain)   Pharyngeal Phase intact   Diagnostic Statement Pain, impaired oral prep phase   Swallowing Recommendations   Diet Consistency Recommendations minced & moist (level 5);mildly thick liquids (level 2)   Supervision Level for Intake distant supervision needed   Mode of Delivery Recommendations bolus size, small;slow rate of intake   Swallowing Maneuver Recommendations alternate food and liquid intake;double dry swallow   Monitoring/Assistance Required (Eating/Swallowing) stop eating activities when fatigue is present;monitor for cough or change in vocal quality with intake   Recommended Feeding/Eating Techniques (Swallow Eval) patient is independent, no specific recommendations;moisten oral mucosa prior to intake;maintain upright sitting position for eating;maintain upright posture during/after eating for 30 minutes;set-up and prepare tray   Medication Administration Recommendations, Swallowing (SLP) Whole pills with thick liquids   Instrumental Assessment Recommendations VFSS (videofluoroscopic swallowing study)  (Consider at a later date pending labl values and pain levels)   Clinical Impression   Criteria for Skilled Therapeutic Interventions Met (SLP Eval) Yes, treatment indicated   SLP Diagnosis Moderate oropharyngeal dysphagia   Risks & Benefits of therapy have been explained evaluation/treatment results reviewed;risks/benefits reviewed;patient   SLP Total Evaluation Time   Eval: oral/pharyngeal swallow function, clinical swallow Minutes (04179) 45                      Interventions   Interventions Quick Adds Swallowing Dysfunction   Swallowing Dysfunction &/or Oral Function for Feeding   Treatment of Swallowing Dysfunction &/or Oral Function for Feeding Minutes (74187) 15   Symptoms Noted During/After Treatment Fatigue;Pain increased   Treatment Detail/Skilled Intervention Trained patient re: diet modification for mucositis and xerostomia, with patient verbalizing  comprehension of comfort strategies also trained with minimal verbal cueing needed today.   SLP Discharge Planning   SLP Plan Meal follow up, assess soft and bite size and slightly thick liquids.  Consider VFSS pending progress at bedside and pain levels.   SLP Discharge Recommendation home with outpatient speech therapy   SLP Rationale for DC Rec Recommend follow up SLP services at next level of care due to being significantly below swallowing baseline, known safety risks with regular diet textures.   SLP Brief overview of current status  Clinical swallow evaluation completed and treatment initiated: recommend minced and moist diet with mildly thick liquids.  No straw, allow double swallow for each bite or sip.  Eat only when fully alert and upright and with extra caution if utilizing magic mouthwash first due to sensation impairment with the topical lidocaine use and known aspiration risk.  Will pursue video swallow at a later date pending bleeding and pain risk stabilization.   Total Session Time   Total Session Time (sum of timed and untimed services) 60

## 2023-02-01 NOTE — PROGRESS NOTES
Brief Cardiology Note  Pt: Shira PERSON 1960      Spoke with patient regarding LETA results.Findings shows no cardiac mass or thrombus. No aortic valve vegetation and no dissection. Started statin therapy.     Marybel Jones CNP  5:05 PM 2023   [facility]  Pager: [phone]

## 2023-02-01 NOTE — PLAN OF CARE
[id number]: A&Ox4. VSS on RA. C/o of pain given prns as available.Minced/moist w/  mildly thick diet, poor appetite. Purewick w/ adequate UOP. PIV infusing sodium bicarb at 75 ml/hr. PIV x2 SL. Plts infused and tolerated well. A2. Turn and repo q2h.

## 2023-02-01 NOTE — PROGRESS NOTES
Nephrology Progress Note  02/01/2023         Assessment & Recommendations:   61-year-old female with rheumatoid arthritis, recently started on methotrexate -admitted with severe pancytopenia, neutropenic fever, intertrigo, severe sepsis, KRISS    1 acute renal failure-normal creatinine as of 2021.  Creatinine was up to 1.7 in May 2022, and more recently around 2.1.  Unclear cause of rising creatinine in 2022.  Attributed to dehydration but never really went down.  -Now admitted with a creatinine of 3.2 in setting of sepsis, hypotension, pancytopenia, poor oral intake, use of lisinopril.  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  Urinalysis with multiple hyaline casts and amorphous crystals consistent with prerenal state.  Relatively bland with 5 RBCs per high-power field, 1+ protein and no casts.  -CT abdomen with normal-looking kidneys.  Excellent urine output.    -Creatinine continues to trend down    2 pancytopenia-hematology on board.  Noted to have severe vitamin B12 deficiency and folate deficiency.  May need bone marrow biopsy.  Several labs are pending.  Methotrexate on hold.  Methotrexate level low.  -Status post Neupogen and and vitamin B12/folic acid  -PRBC and platelet transfusion per primary team    3 neutropenic fever-no apparent source of infection.  On cefepime and doxycycline.  -Infectious work-up underway.  - On cefepime     4 intertrigo-on fluconazole.    5 metabolic acidosis-with renal failure.  Improving on sodium bicarbonate.  Urine pH appropriate.    Discussion/recommendation-  Encouraged to see improving renal function and good urine output.  Continue to hold lisinopril.  Continue IV fluid for 1 more day   Daily renal panel.      Sarahi Kelley MD  [facility] - Nephrology   [phone]      Interval History :   Seen / examined.   Feels tired.   Had some epistaxis  No fever  LETA neg.   Cr continues to slowly drift down.   Good UOP   Breathing okay    Review of Systems:   A  "4 point review of systems was negative except as noted above.  Notably: poor appetite. no nausea or vomiting or diarrhea.  no confusion,  no fever or chills    Physical Exam:   I/O last 3 completed shifts:  In: 540 [P.O.:240]  Out: 1700 [Urine:1700]  /60 (BP Location: Right arm)   Pulse 91   Temp 99.4  F (37.4  C) (Axillary)   Resp 18   Ht 1.676 m (5' 6\")   Wt 129 kg (284 lb 6.3 oz)   SpO2 95%   BMI 45.90 kg/m      GENERAL APPEARANCE: alert and no distress  EYES:  no scleral icterus, pupils equal  PULM: lungs clear to auscultation, equal air movement, no cyanosis or clubbing  CV: regular rhythm, normal rate, no rub     -JVP -     -edema +  GI: soft, non tender,   NEURO: mentation intact and speech normal, no asterixis       Labs:   All labs reviewed by me  Electrolytes/Renal -   Recent Labs   Lab Test 02/01/23  0734 01/31/23  1133 01/31/23  0846 01/31/23  0757 01/30/23  2043 01/30/23  1217 10/04/19  1410 10/04/19  0704 10/03/19  2049 10/03/19  0604 10/02/19  0545     --   --  141  --  138   < > 137   < > 139 140   POTASSIUM 3.4  --   --  3.4  --  3.6   < > 4.2   < > 3.8 3.8   CHLORIDE 114*  --   --  111*  --  112*   < > 105   < > 105 106   CO2 20*  --   --  17*  --  14*   < > 26   < > 25 26   BUN 43.9*  --   --  44.3*  --  47.6*   < > 29   < > 22 18   CR 2.41*  --   --  2.52*  --  2.65*   < > 0.93   < > 0.79 0.76   GLC 91 84 85 86  --  82   < > 97   < > 96 94   MARIA INES 8.0*  --   --  8.1*  --  8.2*   < > 9.2   < > 9.1 9.0   MAG 2.0  --   --  2.2 2.4* 1.5*   < > 2.0   < > 2.1 2.2   PHOS  --   --   --   --   --   --   --  4.2  --  4.3 4.4    < > = values in this interval not displayed.       CBC -   Recent Labs   Lab Test 02/01/23  0734 01/31/23  1009 01/31/23  0838   WBC 0.3* 0.2* 0.2*   HGB 7.2* 6.9* 6.5*   PLT 25* 24* 23*       LFTs -   Recent Labs   Lab Test 01/28/23  2221 12/06/22  1049 10/24/22  1138 05/24/22  0959   ALKPHOS 57 65  --  70   BILITOTAL 0.7 0.3  --  0.4   ALT 13 12  --  15   AST 28 " 12  --  14   PROTTOTAL 6.1* 7.5 8.0 8.2   ALBUMIN 2.5* 3.6  --  3.5       Iron Panel -   Recent Labs   Lab Test 01/29/23  0626 12/18/19  1129 09/24/16  1304   IRON 47 46 71   IRONSAT 40 17  --    RAMÍREZ 1,499* 125 83           Current Medications:    ARIPiprazole  5 mg Oral Daily     atorvastatin  40 mg Oral QPM     ceFEPIme  2 g Intravenous Q12H     cyanocobalamin  1,000 mcg Intramuscular Daily     doxycycline hyclate  100 mg Oral Q12H On license of [facility] (08/20)     DULoxetine  120 mg Oral Daily     filgrastim  480 mcg Subcutaneous Daily at 8 pm     fluconazole  100 mg Oral Daily     folic acid  2 mg Oral Daily     gabapentin  100 mg Oral TID     miconazole   Topical BID     nystatin  500,000 Units Swish & Spit 4x Daily     pantoprazole  40 mg Intravenous Q12H     sodium chloride (PF)  3 mL Intracatheter Q8H     sodium chloride (PF)  3 mL Intracatheter Q8H     sodium chloride (PF)  3 mL Intracatheter Q8H     warfarin-No DOSE today  1 each Does not apply no dose today (warfarin)       - MEDICATION INSTRUCTIONS -       - MEDICATION INSTRUCTIONS -       IV infusion builder WITH additives 75 mL/hr at 02/01/23 0032     Warfarin Therapy Reminder       Sarahi Kelley MD

## 2023-02-01 NOTE — PLAN OF CARE
"1900-0730  Pt is alert and oriented, drowsy. Up with lift, on pulsate mattress. Neutropenic precautions, taking Neupogen. Slight nosebleed overnight, con't to monitor platelet levels. C/o overall pain, taking PRN oxy. Frequent congested cough, pt states she has had it \"for years\". Wounds under breasts, abd folds, ally area and under right knee, interdry in place. Purewick in place, ally area is very red/sore. C/o mouth and throat pain, started on nystatin and magic mouthwash, which is somewhat helpful. Difficult to swallow meds, appetite is poor.                         "

## 2023-02-01 NOTE — PLAN OF CARE
Goal Outcome Evaluation:         Pt A&Ox4, drowsy. VSS on RA, soft BPs. Pt A2 w/lift, T/R q2hr. Pt NPO for LETA this afternoon. Neutropenic precautions maintained. On Mag protocol, WNL. New IV placed on R hand due to multiple infusions, infusing 1/2NS+Sodium Bicarb at 75mL/hr with intermittent antibiotics. Hgb 6.5 this AM, 1 unit PRBC given. Platelets 24, 1 unit of platelets given. Tele sinus tach. Pt complains of pain in mouth/throat, unable to swallow morning medications. Magic mouthwash ordered. Purewick in place for incontinence, good urine output. Pt back from procedure at 1700. Wound cares done to all areas. [facility] Many specialties following, continue to monitor.

## 2023-02-01 NOTE — PROGRESS NOTES
[facility]  Hematology-Oncology Follow-up Note      Today's Date:February 1, 2023   Date of Admission:  1/28/2023  Reason for Consult: Pancytopenia with severe B12 and folate deficiencies      ASSESSMENT:  Shira Rodriguez is a 62 year old female who has been admitted with septic shock and neutropenic fevers    Neutropenic fever   Bacterial sepsis  Acute kidney injury/ acute renal failure  Pancytopenia with severe neutropenia, moderate anemia, with severe B12 and folate deficiency  Morbid obesity    Shira has been admitted with severe sepsis in the setting of pancytopenia. She has severe deficiency in B12 and folic acid. I have started on B12 supplementation 1000 mcg daily subcutaneous and folate supplementation with 5 mg on day 1 and then 2 mg daily.  I have also started her on neupogen for her severe neutropenia. Changes from severe B12 and folate deficiency could mimic myelodysplastic syndrome. She is not showing any improvement despite being started on B12 and folic acid. We have not seen improvement in any of the 3 lines. Her peripheral blood cytometry was unremarkable but it will not give us the full picture. I will get a bone marrow biopsy due to lack of improvement. Her protein electrophoresis shows a very small (0.1 g/dl) M spike of IgM lambda subtype which is not clinically significant. She has elevated kappa and lambda light chains with normal ratio - this is only reflective of renal failure. Se Ig G, A, M levels pending.     Her d-dimer levels and fibrinogen are markedly elevated. These are likely related to the pro-inflammatory state. However d-dimer levels are quite elevated for her. She is not in DIC with elevated fibrinogen levels which was a concern.   Recent Labs   Lab Test 01/29/23  1829 09/19/19  1549 09/19/19  0337 09/18/19  1557 09/18/19  0346   DD 7.38* >20.0* 19.0* 15.2* 12.5*     She has nose bleeds with ongoing anticoagulation for pulmonary embolism and current  platelet counts of 24k. She had a very small bump with platelet transfusion. We would have to review with blood bank for their suggestions on how to improve this.     RECOMMENDATIONS:  - Continue to replace vitamin B12 and folate  -  Methotrexate undetectable. Still it is hard to rule out methotrexate toxicity. I will start her on leucovorin  - Fibrinogen elevated and despite very high d-dimer argues against DIC  - Recommend platelet transfusion to keep platelet counts > 50k    She had bilateral life threatening pulmonary embolism with cardiac arrest and now has d-dimer of 7.38  - Continue neupogen for severe neutropenia with sepsis - 480 mcg daily until ANC > 1500  - Check CBC with diff daily  - Patient complains of mouth sores. With severe neutropenia and ongoing antibiotics she is at risk for fungal infections.    She has been started on nystatin and fluconazole.   - Agree with management as per primary; LETA has been negative  - Recommned bone marrow biopsy since she has not shown any improvement despite B12 and folate supplementation.     Over 50 min spent on day of visit including review of tests, obtaining/reviewing separately obtained history/physical exam, counseling patient, ordering medications/tests/procedures, communicating with PCP/consultants, and documenting in electronic medical record.    Julio César Hills  Hematologist and Medical Oncologist  [facility]      INTERIM HISTORY:  Shira was followed today and was alone in her room. She is not feeling well. She again had nose bleeds earlier in the day. She is not feeling well overall and has not improved significantly since her admission.      MEDICATIONS:  Current Facility-Administered Medications   Medication     acetaminophen (TYLENOL) tablet 650 mg    Or     acetaminophen (TYLENOL) Suppository 650 mg     ARIPiprazole (ABILIFY) tablet 5 mg     atorvastatin (LIPITOR) tablet 40 mg     benzocaine-menthol (CHLORASEPTIC) 6-10 MG lozenge 1 lozenge      ceFEPIme (MAXIPIME) 2 g vial to attach to  ml bag for ADULTS or 50 ml bag for PEDS     cyanocobalamin injection 1,000 mcg     doxycycline hyclate (VIBRAMYCIN) capsule 100 mg     DULoxetine (CYMBALTA) DR capsule 120 mg     filgrastim (NEUPOGEN) injection syringe SOSY 480 mcg     fluconazole (DIFLUCAN) tablet 100 mg     flumazenil (ROMAZICON) injection 0.2 mg     folic acid (FOLVITE) tablet 2 mg     gabapentin (NEURONTIN) capsule 100 mg     Give   of usual dose of LONG ACTING insulin AM of procedure IF diabetic     HOLD: Insulin - RAPID/SHORT acting AM of procedure IF diabetic     HOLD: Insulin - REGULAR AM of procedure IF diabetic     HOLD: Oral hypoglycemics AM of procedure IF diabetic     ipratropium - albuterol 0.5 mg/2.5 mg/3 mL (DUONEB) neb solution 3 mL     lidocaine (LMX4) cream     lidocaine 1 % 0.1-1 mL     magic mouthwash suspension (diphenhydramine, lidocaine, aluminum-magnesium & simethicone)     May take oral meds with a sip of water, the morning of LETA procedure.     melatonin tablet 3 mg     miconazole (MICATIN) 2 % powder     NaCl 0.45 % 1,000 mL with sodium bicarbonate 75 mEq/L infusion     naloxone (NARCAN) injection 0.2 mg    Or     naloxone (NARCAN) injection 0.4 mg    Or     naloxone (NARCAN) injection 0.2 mg    Or     naloxone (NARCAN) injection 0.4 mg     nitroGLYcerin (NITROSTAT) sublingual tablet 0.4 mg     nystatin (MYCOSTATIN) suspension 500,000 Units     ondansetron (ZOFRAN ODT) ODT tab 4 mg    Or     ondansetron (ZOFRAN) injection 4 mg     oxyCODONE IR (ROXICODONE) half-tab 2.5 mg     pantoprazole (PROTONIX) IV push injection 40 mg     senna-docusate (SENOKOT-S/PERICOLACE) 8.6-50 MG per tablet 1 tablet    Or     senna-docusate (SENOKOT-S/PERICOLACE) 8.6-50 MG per tablet 2 tablet     sodium chloride (PF) 0.9% PF flush 3 mL     sodium chloride (PF) 0.9% PF flush 3 mL     sodium chloride (PF) 0.9% PF flush 3 mL     sodium chloride (PF) 0.9% PF flush 3 mL     sodium chloride (PF) 0.9%  "PF flush 3 mL     sodium chloride (PF) 0.9% PF flush 3 mL     sodium chloride (PF) 0.9% PF flush 3 mL     WARFARIN THERAPY REMINDER     warfarin-No DOSE today     Facility-Administered Medications Ordered in Other Encounters   Medication     sodium chloride (PF) 0.9% PF flush 10 mL           ALLERGIES:  Allergies   Allergen Reactions     Adhesive Tape Blisters     Erythromycin Rash         PHYSICAL EXAM:  Vital signs:  Temp: 99.4  F (37.4  C) Temp src: Axillary BP: 108/60 Pulse: 91   Resp: 18 SpO2: 95 % O2 Device: None (Room air) Oxygen Delivery: 2 LPM Height: 167.6 cm (5' 6\") Weight: 129 kg (284 lb 6.3 oz)  Estimated body mass index is 45.9 kg/m  as calculated from the following:    Height as of this encounter: 1.676 m (5' 6\").    Weight as of this encounter: 129 kg (284 lb 6.3 oz).    LABS:  Recent Labs   Lab Test 02/01/23  0734 01/31/23  1133 01/31/23  0846 01/31/23 0757 01/30/23 1217 01/29/23  0626 01/28/23  2221     --   --  141 138 135* 132*   POTASSIUM 3.4  --   --  3.4 3.6 4.6 5.2   CHLORIDE 114*  --   --  111* 112* 108* 103   CO2 20*  --   --  17* 14* 12* 13*   ANIONGAP 10  --   --  13 12 15 16*   BUN 43.9*  --   --  44.3* 47.6* 54.6* 55.5*   CR 2.41*  --   --  2.52* 2.65* 2.88* 3.25*   GLC 91 84 85 86 82 73 84   MARIA INES 8.0*  --   --  8.1* 8.2* 8.4* 8.9     Recent Labs   Lab Test 02/01/23  0734 01/31/23  0757 01/30/23 2043 01/30/23  1217 01/28/23  2221 10/05/19  0521 10/04/19  0704 10/03/19  2049 10/03/19  0604 10/02/19  0545 10/01/19  0548 09/30/19  0625   MAG 2.0 2.2 2.4* 1.5* 1.4*   < > 2.0   < > 2.1 2.2 1.9 2.2   PHOS  --   --   --   --   --   --  4.2  --  4.3 4.4 3.9 4.0    < > = values in this interval not displayed.     Recent Labs   Lab Test 02/01/23  0734 01/31/23  1009 01/31/23  0838 01/30/23  0237 01/29/23  1827 01/29/23  0626 01/28/23  2221 12/07/22  1340 12/06/22  1049 05/24/22  0959 01/13/21  1419 09/24/20  1415   WBC 0.3* 0.2* 0.2* 0.2* 0.2*   < > 0.4* 10.6 15.4* 10.0   < > 6.8   HGB " 7.2* 6.9* 6.5* 7.8* 8.1*   < > 9.4* 13.4 13.9 14.9   < > 15.0   PLT 25* 24* 23* 20* 31*  --  59* 271 369 290   < > 146*   MCV 87 87 87 88 89   < > 90 88 89 96   < > 91   NEUTROPHIL  --   --   --   --   --   --  24 78 86 75  --  77.7    < > = values in this interval not displayed.     Recent Labs   Lab Test 01/29/23  1827 01/28/23  2221 12/06/22  1049 10/24/22  1138 05/24/22  0959 10/06/19  0702 10/05/19  0521   BILITOTAL  --  0.7 0.3  --  0.4   < > 0.4   ALKPHOS  --  57 65  --  70   < > 65   ALT  --  13 12  --  15   < > 18   AST  --  28 12  --  14   < > 12   ALBUMIN  --  2.5* 3.6  --  3.5   < > 2.8*     --   --  300*  --   --  202    < > = values in this interval not displayed.     TSH   Date Value Ref Range Status   12/06/2022 1.60 0.30 - 4.20 uIU/mL Final   05/24/2022 2.23 0.40 - 4.00 mU/L Final   06/16/2021 1.43 0.40 - 4.00 mU/L Final   09/24/2020 1.59 0.40 - 4.00 mU/L Final   06/12/2020 1.49 0.40 - 4.00 mU/L Final     No results for input(s): CEA in the last 24739 hours.  Results for orders placed or performed during the hospital encounter of 01/28/23   XR Chest Port 1 View    Narrative    EXAM: XR CHEST PORT 1 VIEW  LOCATION: [facility]  DATE/TIME: 1/28/2023 11:57 PM    INDICATION: Sepsis.  COMPARISON: 12/06/2022      Impression    IMPRESSION:     No focal airspace disease. No pleural effusion or pneumothorax.    The cardiomediastinal silhouette is unremarkable. Aortic calcifications.   CT Head w/o Contrast    Narrative    EXAM: CT HEAD W/O CONTRAST  LOCATION: [facility]  DATE/TIME: 1/29/2023 12:33 AM    INDICATION: Delirium and unresponsiveness  COMPARISON:  CT head 09/10/2019.  TECHNIQUE: Routine CT Head without IV contrast. Multiplanar reformats. Dose reduction techniques were used.    FINDINGS:  INTRACRANIAL CONTENTS: No intracranial hemorrhage, extraaxial collection, or mass effect.  No CT evidence of acute infarct. Mild presumed chronic small  vessel ischemic changes. Mild generalized volume loss. No hydrocephalus.     VISUALIZED ORBITS/SINUSES/MASTOIDS: No intraorbital abnormality. No significant paranasal sinus mucosal disease. No middle ear or mastoid effusion.    BONES/SOFT TISSUES: No acute abnormality.      Impression    IMPRESSION:  1.  No CT evidence for acute intracranial process.  2.  Brain atrophy and presumed chronic microvascular ischemic changes as above.   CT Chest Abdomen Pelvis w/o Contrast    Narrative    EXAM: CT CHEST ABDOMEN PELVIS W/O CONTRAST  LOCATION: [facility]  DATE/TIME: 1/29/2023 12:32 AM    INDICATION: Sepsis.  COMPARISON: Chest CT from 7/21/2022 and 5/24/2022.  TECHNIQUE: CT scan of the chest, abdomen, and pelvis was performed without IV contrast. Multiplanar reformats were obtained. Dose reduction techniques were used.   CONTRAST: None.    FINDINGS:   LUNGS AND PLEURA: Patchy mostly linear opacities left lower lobe inferiorly favored to be due to some chronic scarring and/or atelectasis and similar to previous. Additional scattered areas of mild subpleural scarring or atelectasis elsewhere in the   periphery of the lungs. No definite acute appearing infiltrates or consolidation. Central airways are clear. Small chronic appearing left pleural effusion is unchanged.    MEDIASTINUM/AXILLAE: Small amount of fluid adjacent to the distal esophagus of uncertain significance. Small hiatal hernia. Scattered small posterior mediastinal lymph nodes probably reactive in nature. Normal heart size. No pericardial effusion. Normal   caliber thoracic aorta. Axillary regions are unremarkable.    CORONARY ARTERY CALCIFICATION: Mild.    HEPATOBILIARY: Liver is negative. Cholelithiasis. No biliary dilatation.    PANCREAS: Normal.    SPLEEN: Normal.    ADRENAL GLANDS: 3 cm right adrenal nodule is technically indeterminate on this study but unchanged compared to 05/24/2022 and compatible with an adrenal adenoma based  off an older MRI scan by report. Left adrenal gland is negative.    KIDNEYS/BLADDER: Normal.    BOWEL: Bowel is normal in caliber with no evidence for obstruction. Question some mild edema seen around the second and third portions of the duodenum, nonspecific. Normal appendix. Scattered colonic diverticuli without evidence for diverticulitis. No   acute bowel findings.    LYMPH NODES: Normal.    VASCULATURE: Unremarkable.    PELVIC ORGANS: Normal.    MUSCULOSKELETAL: Mild to moderate degenerative changes throughout the spine. Right TAWNY. Moderate size fat-containing umbilical hernia.      Impression    IMPRESSION:  1.  Areas of mostly linear scarring and/or atelectasis in the lungs greatest in the left lung base. Nothing definite for acute pneumonitis.    2.  Stable chronic appearing small left pleural effusion.    3.  Small amount of fluid seen adjacent to the distal thoracic esophagus of uncertain significance. Scattered nonspecific posterior mediastinal lymph nodes along the course of the esophagus. These findings are of uncertain significance or etiology.   Correlation for any signs of esophagitis or other inflammatory process.    4.  Cholelithiasis. No biliary dilatation.    5.  Question small amount of edema seen around the second and third portions of the duodenum. Clinical correlation to exclude any signs of a duodenal inflammatory process. Correlation with pancreatic enzymes also suggested to exclude pancreatitis.    6.  3 cm right adrenal nodule is technically indeterminate on this study but unchanged from prior CT scan and reportedly represents a benign adrenal adenoma based off a prior MRI.    7.  Scattered colonic diverticuli without evidence for diverticulitis. Normal appendix.   Echocardiogram Complete     Value    LVEF  55-60%    Narrative    [id number]  [id number]  [id number]  [id number]^MAAME^FEDERICO^OMARI     [facility]  Echocardiography Laboratory  [address] [place] [address]  [id number]     Name: XAVIER ROWELL  MRN: [id number]  : 1960  Study Date: 2023 10:46 AM  Age: 62 yrs  Gender: Female  Patient Location: [facility]  Reason For Study: CHF  Ordering Physician: FEDERICO ISABEL  Referring Physician: Anjel Busby  Performed By: Emma Foster     BSA: 2.3 m2  Height: 66 in  Weight: 284 lb  HR: 104  BP: 132/72 mmHg  ______________________________________________________________________________  Procedure  Complete Portable Echo Adult. Optison (NDC #[id number]) given intravenously.  ______________________________________________________________________________  Interpretation Summary     There is mild to moderate concentric left ventricular hypertrophy.  Left ventricular systolic function is normal.  The right ventricle is mildly dilated.  Mildly decreased right ventricular systolic function  IVC diameter >2.1 cm collapsing <50% with sniff suggests a high RA pressure  estimated at 15 mmHg or greater.  Aortic valve not well seen but it is abnormal. Likely trileaflet with  sclerosis/stenosis  Mild to moderate valvular aortic stenosis.  No aortic regurgitation is present.  ABNORMAL FINDING- the ascending aorta at sinotubular ridge is not well seen  but there is eiither artifact vs possible dissection vs vegetation/flap. REC-  LETA if possible or at least CT aortogram (this echo report called to silver)  NSR with frequent ectopy  Technically difficult, suboptimal study.  ______________________________________________________________________________  Left Ventricle  The left ventricle is normal in size. There is mild to moderate concentric  left ventricular hypertrophy. Left ventricular systolic function is normal.  The visual ejection fraction is 55-60%. Left ventricular diastolic function is  indeterminate. Normal left ventricular wall motion.     Right Ventricle  The right ventricle is mildly dilated. Mildly decreased right ventricular  systolic function.     Atria  The left  atrium is borderline dilated. Right atrial size is normal.     Mitral Valve  There is moderate mitral annular calcification.     Tricuspid Valve  No tricuspid regurgitation. Right ventricular systolic pressure could not be  approximated due to inadequate tricuspid regurgitation. IVC diameter >2.1 cm  collapsing <50% with sniff suggests a high RA pressure estimated at 15 mmHg or  greater.     Aortic Valve  The aortic valve is not well visualized. Aortic valve not well seen but it is  abnormal. Likely trileaflet with sclerosis/stenosis. No aortic regurgitation  is present. Mild to moderate valvular aortic stenosis.     Pulmonic Valve  The pulmonic valve is not well seen, but is grossly normal.     Vessels  The aortic root is not well visualized. ABNORMAL FINDING- the ascending aorta  at sinotubular ridge is not well seen but there is eiither artifact vs  possible dissection vs vegetation/flap. REC- LETA if possible or at least CT  aortogram (this echo report called to adrianne).     Pericardium  The pericardium appears normal.     Rhythm  Sinus rhythm was noted. NSR with frequent ectopy.  ______________________________________________________________________________  MMode/2D Measurements & Calculations  IVSd: 1.5 cm     LVIDd: 4.9 cm  LVIDs: 3.2 cm  LVPWd: 1.3 cm  FS: 33.9 %  LV mass(C)d: 275.2 grams  LV mass(C)dI: 118.5 grams/m2  Ao root diam: 3.7 cm  LA dimension: 4.5 cm  asc Aorta Diam: 3.3 cm  LA/Ao: 1.2  LVOT diam: 2.4 cm  LVOT area: 4.5 cm2  LA Volume (BP): 70.3 ml  LA Volume Index (BP): 30.3 ml/m2  RWT: 0.53     Doppler Measurements & Calculations  MV E max chris: 77.3 cm/sec  MV A max chris: 99.7 cm/sec  MV E/A: 0.78  MV dec slope: 383.5 cm/sec2  MV dec time: 0.21 sec  Ao V2 max: 333.0 cm/sec  Ao max P.4 mmHg  Ao V2 mean: 236.0 cm/sec  Ao mean P.0 mmHg  Ao V2 VTI: 59.4 cm  MERON(I,D): 1.5 cm2  MERON(V,D): 1.3 cm2  LV V1 max PG: 3.8 mmHg  LV V1 max: 97.7 cm/sec  LV V1 VTI: 20.3 cm  SV(LVOT): 91.8 ml  SI(LVOT):  39.6 ml/m2  PA acc time: 0.10 sec  TR max emile: 250.0 cm/sec  TR max P.0 mmHg  AV Emile Ratio (DI): 0.29  MERON Index (cm2/m2): 0.67  E/E' av.5  Lateral E/e': 11.8  Medial E/e': 11.1     ______________________________________________________________________________  Report approved by: Tariq Doty 2023 01:25 PM         Transesophageal Echocardiogram     Value    LVEF  60-65%    Narrative    [id number]  [id number]  [id number]  [id number]^SURESH^TAIWO^DASHA     [facility]  Echocardiography Laboratory  [address] [place] [id number]     Name: XAVIER ROWELL  MRN: [id number]  : 1960  Study Date: 2023 02:57 PM  Age: 62 yrs  Gender: Female  Patient Location: [facility]  Reason For Study: Endocarditis  Ordering Physician: TAIWO NESS  Referring Physician: TAIWO NESS  Performed By: KEATON Pinto     BSA: 2.3 m2  Height: 66 in  Weight: 284 lb  HR: 122  BP: 141/63 mmHg  ______________________________________________________________________________  Procedure  Complete LETA Adult. 3D image acquisition, reconstruction, and real-time  interpretation was performed.  ______________________________________________________________________________  Interpretation Summary     1. No intracardiac mass or thrombus.  2. Normal left ventricular size and systolic function. LVEF 60-65%.  3. Normal right ventricular size and systolic function.  4. Trileaflet aortic valve sclerosis with trace regurgitation, no stenosis.  5. No evidence of aortic valve vegetation or aortic dissection, noted on  yesterday's transthoracic echocardiogram.     Findings discussed with Dr. Ness.     ______________________________________________________________________________  LETA  Versed (3mg) was given intravenously. Fentanyl (100mcg) was given  intravenously. I determined this patient to be an appropriate candidate for  the planned sedation and procedure and have reassessed the patient  immediately  prior to sedation and procedure. Total sedation time: 12 minutes of continuous  bedside 1:1 monitoring. Prior to the exam, the oral cavity was checked and no  overcrowding was noted. Consent to the procedure was obtained prior to  sedation. The transesophageal probe was passed without difficulty. There were  no complications associated with this procedure.     Left Ventricle  The left ventricle is normal in size. Left ventricular systolic function is  normal. The visual ejection fraction is 60-65%. No regional wall motion  abnormalities noted. There is no thrombus seen in the left ventricle.     Right Ventricle  The right ventricle is normal size. The right ventricular systolic function is  normal. There is no mass or thrombus in the right ventricle.     Atria  Normal left atrial size. No left atrial mass or thrombus visualized. Right  atrial size is normal. No evidence of right atrial mass/thrombus. Intact  atrial septum. There is no color Doppler evidence of an atrial shunt. The left  atrial appendage was well visualized and free of thrombus.     Mitral Valve  The mitral valve leaflets appear normal. There is no evidence of stenosis,  fluttering, or prolapse. There is trace mitral regurgitation. There is no  mitral valve stenosis.     Tricuspid Valve  Normal tricuspid valve. There is trace tricuspid regurgitation.     Aortic Valve  The aortic valve is trileaflet with aortic valve sclerosis. There is moderate  aortic sclerosis of the left coronary cusp. There is no vegetation on the  aortic valve. There is trace aortic regurgitation. No hemodynamically  significant valvular aortic stenosis.     Pulmonic Valve  Normal pulmonic valve. There is trace pulmonic valvular regurgitation.     Vessels  The aortic root is normal size. Normal size ascending aorta. Normal ascending,  transverse (arch), and descending aorta. Normal pulmonary venous drainage.     Pericardial/Pleural  There is no pericardial  effusion.     Rhythm  Sinus rhythm was noted.  ______________________________________________________________________________  Report approved by: Dr Mazin Bourgeois 01/31/2023 04:47 PM     ______________________________________________________________________________        *Note: Due to a large number of results and/or encounters for the requested time period, some results have not been displayed. A complete set of results can be found in Results Review.

## 2023-02-01 NOTE — PLAN OF CARE
[id number]: VSS on RA, 99 axillary. Tele SR in 90s. Oxy and tylenol given as able- hospitalist increased frequency this afternoon. Pt yelling out in pain from wound cares. Magic mouthwash given x1. Poor appetite but hungry this AM and tried a minced/moist blueberry muffin. Mag replaced, taken off protocol today. No BM since Friday, senna given. Good UOP from [facility]. T/R. Light nosebleed this AM. Hgb 7.2 and Plt 25- started plt transfusion. Plan to monitor counts and for TCU at discharge once medically stable.

## 2023-02-01 NOTE — PROGRESS NOTES
[facility]    Medicine Progress Note - Hospitalist Service    Date of Admission:  1/28/2023    Assessment & Plan     Shira Rodriguez is a 62-year-old female with rheumatoid arthritis on methotrexate, prior massive PE on warfarin, morbid obesity who presented on 1/28/2023 with severe sepsis.  She was found unresponsive in her home. She  Was hypotensive and  had temp of 100.2 in route to hospital.      Severe sepsis-present on admission  candida intertrigo  Neutropenic fever  Lactic acidosis -Resolved   - With lactic acidosis of 3.2, hypotension, HR of 105, WBC of 0.4, acute metabolic encephalopathy - met criteria for severe sepsis on admission  - CT C/A/P showed small chronic left pleural effusion, small fluid around distal esophagus and duodenum  - she was  Noted to have panniculitis under abdominal pannus, significant redness under her breasts.  Source could be panniculitis, mucositis due to severe neutropenia, duodenitis.  - UA does not suggest UTI, no pneumonia, COVID-19, influenza and RSV negative and   Has a cough which is chronic. Has had severe throat pain for 2 days.  Has diverticulosis but no diverticulitis  - Blood cultures and urine cultures negative so far  - Blood pressure improved with IV fluids and is currently in normal range.    - Lactic acidosis has resolved  -She was initially started on broad-spectrum antibiotics including cefepime, vancomycin and infectious disease and oncology were consulted.  -Her antibiotics were switched to cefepime, doxycycline and fluconazole( to cover for Candida intertrigo)  -She has infectious work-up sent including tick panel which has been pending, and her serum Fungitell assay( 1,3 beta d glucan) is positive and tested negative for hepatitis B and C and negative for HIV, she also detected positive for EBV IgG, CMV is negative and Blastomyces and histoplasmosis is pending along with Rickettsia  -He also had immunological work-up and C3 and  C4 are negative, ANCA is negative and double-stranded DNA is pending, methotrexate levels are undetectable  -Of note she had an TTE done which was concerning for dissection versus vegetation and she underwent LETA on 1/31 which does not show any evidence of dissection and no evidence of any vegetation  -ID and oncology/hematology on board and will continue patient with current antibiotics  -I did reviewed the note from infectious disease and we will DC doxycycline after 5-day course tomorrow, continue with cefepime due to ongoing neutropenia and unclear significance ofBeta D glucan at this time, no obvious systemic fungal infection and  Continue with Fluconazole for candida intertrigo  -We will continue to monitor       Acute metabolic encephalopathy-secondary to severe sepsis-resolved  -Head CT-did not show any acute changes  -On exam she is at baseline     Pancytopenia with severe leukopenia and neutropenia, moderate anemia and thrombocytopenia  Severe vitamin B12 deficiency and folate deficiency  - On 12/7/2022, CBC showed hemoglobin 13.4, WBC 10.6, platelets 271  - On admission, 1/28 - CBC showed Hb 9.4, WBC 0.4, ANC 0.1, platelets 59  - Iron studies showed normal iron saturation, low iron binding capacity indicating anemia of chronic disease.  Reticulocyte count suppressed  - B12 low at < 150 and folate low at 2.8  -elevated D-dimer of 7.38, fibrinogen is 932 and PTT is 42 with INR of 1.99  - Peripheral smear was done which shows - Moderate to marked pancytopenia.Negative for schistocytes. Negative for circulating blasts on scanning.  -I also reviewed the results of flow cytometry and it shows polytypic B cells, no aberrant immunophenotype on T cells and rare to absent CD34 positive blasts  -She does have elevated Kappa and lambda free light chains, monoclonal IgM immunoglobulin of lambda light chain type, IgM is elevated at 395 and IgG and IgA are within normal limits and also reviewed the report of serum  electrophoresis  -For oncology of patient's elevatedfibrinogen level and a significantly elevated D-dimer argues against DIC  -Serum methotrexate levels are normal  -Her platelet count has been on the lower side and she was given 1 unit ED on 1/31 and 2/1 and her platelet count today is 25 and will give her another unit of platelet after discussing with oncology  -She is also receiving Neupogen with goal of ANC more than 500  -Status 1 unit of blood on 1/31/2023 and hemoglobin today is 7.2  -Continue the patient with daily B12 and folate supplementation  -We will continue to monitor her hemoglobin closely and if her hemoglobin is less than 7 we will give 1 unit of blood     Acute kidney injury on top of CKD 4- Improving  Mild anion gap metabolic acidosis   - secondary to lactic acidosis and acute kidney injury, pH 7.25 on admission  - Baseline creatinine 2-2.2.  Creatinine on admission 3.25.    -Nephrology has been consulted since admission and she has been started on bicarb drip and her creatinine is being monitored and has improved to 2.41 and her bicarb is improved to 20 and continue with bicarb drip and nephrology is on board and appreciate input     Probable esophagitis  Probable duodenitis  -CT on admission showed small amount of fluid adjacent to distal thoracic esophagus and scattered posterior mediastinal lymph nodes along esophagus of uncertain etiology but could be secondary to esophagitis or other inflammatory process.  CT scan also showed small edema around second and third portion of duodenum.  -Lipase is normal  -On IV Protonix, continue and will need to have EGD when she is more stable      Hypomagnesemia-resolved  -She had magnesium of 1.4 on admission and was started on magnesium replacement and magnesium is normal today at 2     Mild hypovolemic hyponatremia-resolved  -Sodium 132 on admission and was given IV fluids and sodium today is normal at 141     Type II MI due to severe  "sepsis  -High-sensitivity troponin mildly elevated at 48 and repeat was normal and can be due to demand     Hyperuricemia, uric acid 8.9     History of massive PE causing PEA arrest, 9/2019  -Patient was started on warfarin and her INR was 2.80 on 1/30 and her Coumadin dose was held and INR today is 2.52 and given her low platelets and therapeutic range   -we will continue to hold Coumadin for today also and INR is therapeutic at 2.74     Rheumatoid arthritis  -Had been on methotrexate for some time, resumed about 2 weeks ago due to worsening back and shoulder pain  -Hold methotrexate      3 cm right adrenal nodule  -Noted on CT.  Was present on previous imaging as well.  Likely benign  -A.m. cortisol 1/29, normal at 43.9  -Will need to keep a close eye and follow as outpatient with her primary care physician     COPD  -No acute exacerbation     Chronic major depression  Chronic pain  - see recent pain clinic visit summary from 1/17/2023 for background information  - Continue Abilify, Cymbalta and Neurontin     Morbid obesity, BMI 46     Hypoalbuminemia, albumin 2.5     Panniculitis  Candidal rash-under pannus, breasts  -Wound care on board and continue with miconazole powder     Essential hypertension  -PTA-lisinopril  -Hold lisinopril due to acute kidney injury and hypotension on admission  -We will continue to monitor her blood pressure closely and they have been stable     Severe Obesity: Estimated body mass index is 45.9 kg/m  as calculated from the following:    Height as of this encounter: 1.676 m (5' 6\").    Weight as of this encounter: 129 kg (284 lb 6.3 oz).    Dysphagia   Mucositis   - she has been complaining of throat pain and on exam of her mouth there are area of ulcer and we will consult speech  - we will also use magic mouth wash and nystatin  - speech consulted and reviewed notes and appreciate input  - may need EGD a above but will monitor for now and patient wants to hold consulting GI for " "now       Diet: Combination Diet Minced and Moist Diet (level 5); Mildly Thick (level 2) (No straw); 2 gm NA Diet    DVT Prophylaxis: Warfarin  [place] Catheter: Not present  Lines: None     Cardiac Monitoring: ACTIVE order. Indication: Severe sepsis  Code Status: Full Code      Clinically Significant Risk Factors            # Hypomagnesemia: Lowest Mg = 1.5 mg/dL in last 2 days, will replace as needed   # Hypoalbuminemia: Lowest albumin = 2.5 g/dL at 1/28/2023 10:21 PM, will monitor as appropriate   # Thrombocytopenia: Lowest platelets = 23 in last 2 days, will monitor for bleeding         # Severe Obesity: Estimated body mass index is 45.9 kg/m  as calculated from the following:    Height as of this encounter: 1.676 m (5' 6\").    Weight as of this encounter: 129 kg (284 lb 6.3 oz)., PRESENT ON ADMISSION         Disposition Plan      Expected Discharge Date: 02/06/2023                  Prem Levy MD  Hospitalist Service  [facility]  Securely message with [employer] (more info)  Text page via [employer] Paging/Directory   ______________________________________________________________________    Interval History     I did see the patient today and she denies any chest pain, nausea or vomiting.  She was seen by speech and diet was ordered and she is alert and had mild nosebleed overnight.  Local pain is well controlled.  She does seem to have chronic cough continues to have mouth and throat pain and treatment is helping. Has been having nose bleeds for the past one week          Physical Exam   Vital Signs: Temp: 99.4  F (37.4  C) Temp src: Axillary BP: 108/60 Pulse: 91   Resp: 18 SpO2: 95 % O2 Device: None (Room air) Oxygen Delivery: 2 LPM  Weight: 284 lbs 6.29 oz        General: Patient appears comfortable and in no acute distress.  Oral : area of ulcer  Respiratory:CTLA  Cardiovascular: Regular rate , S1 and S2 normal with no murmer or rubs or gallops  Abdomen:   soft , non tender , non distended " and bowel sound present   Skin: She has significant panniculitis/candidia around the groin, breast folds bilaterally  Neurologic:  No facial droop  Musculoskeletal: Normal Range of motion over upper and lower extremities bilaterally   Psychiatric: cooperative     Medical Decision Making             Time spent in care of patient is 52minutes and I reviewed the patient basic metabolic panel, CBC, peripheral smear, flow cytometry, other immunological labs, reviewed notes from infectious disease and nephrology and discussed her case with family and RN    Data     I have personally reviewed the following data over the past 24 hrs:    0.3 (LL)  \   7.2 (L)   / 25 (LL)     144 114 (H) 43.9 (H) /  91   3.4 20 (L) 2.41 (H) \       INR:  2.74 (H) PTT:  N/A   D-dimer:  N/A Fibrinogen:  N/A       Imaging results reviewed over the past 24 hrs:   Recent Results (from the past 24 hour(s))   Transesophageal Echocardiogram   Result Value    LVEF  60-65%    Narrative    [id number]  [id number]  [id number]  [id number]^SURESH^TAIWO^DASHA     [facility]  Echocardiography Laboratory  [address] [place] [address]     Name: XAVIER ROWELL  MRN: [id number]  : 1960  Study Date: 2023 02:57 PM  Age: 62 yrs  Gender: Female  Patient Location: [facility]  Reason For Study: Endocarditis  Ordering Physician: TAIWO PRINCE  Referring Physician: TAIWO PRINCE  Performed By: KEATON Pinto     BSA: 2.3 m2  Height: 66 in  Weight: 284 lb  HR: 122  BP: 141/63 mmHg  ______________________________________________________________________________  Procedure  Complete LETA Adult. 3D image acquisition, reconstruction, and real-time  interpretation was performed.  ______________________________________________________________________________  Interpretation Summary     1. No intracardiac mass or thrombus.  2. Normal left ventricular size and systolic function. LVEF 60-65%.  3. Normal right ventricular size and systolic  function.  4. Trileaflet aortic valve sclerosis with trace regurgitation, no stenosis.  5. No evidence of aortic valve vegetation or aortic dissection, noted on  yesterday's transthoracic echocardiogram.     Findings discussed with Dr. Ness.     ______________________________________________________________________________  LETA  Versed (3mg) was given intravenously. Fentanyl (100mcg) was given  intravenously. I determined this patient to be an appropriate candidate for  the planned sedation and procedure and have reassessed the patient immediately  prior to sedation and procedure. Total sedation time: 12 minutes of continuous  bedside 1:1 monitoring. Prior to the exam, the oral cavity was checked and no  overcrowding was noted. Consent to the procedure was obtained prior to  sedation. The transesophageal probe was passed without difficulty. There were  no complications associated with this procedure.     Left Ventricle  The left ventricle is normal in size. Left ventricular systolic function is  normal. The visual ejection fraction is 60-65%. No regional wall motion  abnormalities noted. There is no thrombus seen in the left ventricle.     Right Ventricle  The right ventricle is normal size. The right ventricular systolic function is  normal. There is no mass or thrombus in the right ventricle.     Atria  Normal left atrial size. No left atrial mass or thrombus visualized. Right  atrial size is normal. No evidence of right atrial mass/thrombus. Intact  atrial septum. There is no color Doppler evidence of an atrial shunt. The left  atrial appendage was well visualized and free of thrombus.     Mitral Valve  The mitral valve leaflets appear normal. There is no evidence of stenosis,  fluttering, or prolapse. There is trace mitral regurgitation. There is no  mitral valve stenosis.     Tricuspid Valve  Normal tricuspid valve. There is trace tricuspid regurgitation.     Aortic Valve  The aortic valve is trileaflet with  aortic valve sclerosis. There is moderate  aortic sclerosis of the left coronary cusp. There is no vegetation on the  aortic valve. There is trace aortic regurgitation. No hemodynamically  significant valvular aortic stenosis.     Pulmonic Valve  Normal pulmonic valve. There is trace pulmonic valvular regurgitation.     Vessels  The aortic root is normal size. Normal size ascending aorta. Normal ascending,  transverse (arch), and descending aorta. Normal pulmonary venous drainage.     Pericardial/Pleural  There is no pericardial effusion.     Rhythm  Sinus rhythm was noted.  ______________________________________________________________________________  Report approved by: Dr Mazin Bourgeois 01/31/2023 04:47 PM     ______________________________________________________________________________        I also reviewed the report of peripheral smear, flow cytometry, hematological work-up, Bd glucan hepatitis and HIV panel and serum complement and

## 2023-02-01 NOTE — PROGRESS NOTES
[facility]    Infectious Disease Progress Note    Date of Service : 02/01/2023     Assessment:  62 YF with RA on Methotrexate (recently transitioned from subcutaneous dosing to PO), morbid obesity , hx of PEA cardiac arrest due to PE who has been hospitalized with progressive fatigue, was found down and is noted to have severe pancytopenia as well as lactic acidosis related to neutropenic fever. She has significant intertrigo infection in the inframammary and groin folds which is fungal . No bacteremia or other focus of infection found thus far except for mild periumbilical cellulitis. Pancytopenia may be related to Methotrexate toxicity though level is undetectable ( last took a week ago) as well as severe B12/folate deficiency, vs other hematological disorder.      -Pancytopenia ? Methotrexate toxicity   -Neutropenic fever resolved  -B12/folate deficiency  -Abnormal TTE with concern for vegetation vs dissection, JAYJAY is planned  -Mild periumbilical cellulitis and left ankle erythema  -Fungal intertrigo infection in inframammary and groin skin folds  -KRISS   -Lactic acidosis  -Rheumatoid arthritis - on Methotrexate thrice/week  -Hx of PE with PEA cardiac arrest requiring ECMO  -Chronic left pleural effusion  -Morbid obesity     Recommendations  1. Follow pending diagnostics  2. Discontinue Doxycycline after completing 5 day treatment course tomorrow  3. Continue Cefepime due to ongoing Neutropenia  4. Unclear significance of Beta D glucan at this time, no obvious systemic fungal infection. Continue Fluconazole for candida intertrigo  Supportive care and wound care    Scarlett Zhu MD    Interval History   Continues to feel poorly, developed epistaxis, some cough today, ongoing pain in groin folds. WBC slightly up today, has received neupogen, renal functions improving slowly. Jayjay did not show evidence of endocarditis or dissection    Physical Exam   Temp: 99.4  F (37.4  C) Temp src: Axillary  BP: 108/60 Pulse: 91   Resp: 18 SpO2: 95 % O2 Device: None (Room air) Oxygen Delivery: 2 LPM  Vitals:    01/29/23 1935   Weight: 129 kg (284 lb 6.3 oz)     Vital Signs with Ranges  Temp:  [97.9  F (36.6  C)-99.4  F (37.4  C)] 99.4  F (37.4  C)  Pulse:  [] 91  Resp:  [16-20] 18  BP: ()/(49-74) 108/60  SpO2:  [92 %-98 %] 95 %    Constitutional: Awake, alert, chronically ill appearing female, morbidly obese  HEENT : nasal packing in place  Lungs: coarse  Cardiovascular: S1S2, murmur  Abdomen: obese, periumbilical hernia with surrounding cellulitis,   Skin: crusting and intertrigo in groin, inframammary and popliteal skin folds  MS : L ankle erythema and warmth    Other:    Medications     - MEDICATION INSTRUCTIONS -       - MEDICATION INSTRUCTIONS -       IV infusion builder WITH additives 75 mL/hr at 02/01/23 0032     Warfarin Therapy Reminder         ARIPiprazole  5 mg Oral Daily     atorvastatin  40 mg Oral QPM     ceFEPIme  2 g Intravenous Q12H     cyanocobalamin  1,000 mcg Intramuscular Daily     doxycycline hyclate  100 mg Oral Q12H CHELSIE (08/20)     DULoxetine  120 mg Oral Daily     filgrastim  480 mcg Subcutaneous Daily at 8 pm     fluconazole  100 mg Oral Daily     folic acid  2 mg Oral Daily     gabapentin  100 mg Oral TID     magnesium sulfate  2 g Intravenous Once     miconazole   Topical BID     nystatin  500,000 Units Swish & Spit 4x Daily     pantoprazole  40 mg Intravenous Q12H     sodium chloride (PF)  3 mL Intracatheter Q8H     sodium chloride (PF)  3 mL Intracatheter Q8H     sodium chloride (PF)  3 mL Intracatheter Q8H       Data   All microbiology laboratory data reviewed.  Recent Labs   Lab Test 02/01/23  0734 01/31/23  1009 01/31/23  0838   WBC 0.3* 0.2* 0.2*   HGB 7.2* 6.9* 6.5*   HCT 22.0* 21.2* 20.1*   MCV 87 87 87   PLT 25* 24* 23*     Recent Labs   Lab Test 02/01/23  0734 01/31/23  0757 01/30/23  1217   CR 2.41* 2.52* 2.65*     Recent Labs   Lab Test 01/13/21  1419   SED 15      Component      Latest Ref Rng & Units 1/30/2023   Hep B Surface Agn      Nonreactive Nonreactive   Hepatitis C Antibody      Nonreactive Nonreactive      Component      Latest Ref Rng & Units 1/29/2023   HIV Antigen Antibody Combo      Nonreactive Nonreactive      Component      Latest Ref Rng & Units 1/29/2023   A Phagocytophil IgG      <1:80 <1:80   A Phagocytophil IgM      <1:16 < 1:16     Component      Latest Ref Rng & Units 1/29/2023   (1,3)-Beta-D-Glucan      pg/mL 100   B-D GLUCAN INTERPRETATION (1,3)      Negative Positive (A)     Component      Latest Ref Rng & Units 1/29/2023   CMV Caron IgG Instrument Value      <0.60 U/mL <0.20   CMV Antibody IgG      No detectable antibody.  No detectable antibody.   CMV Caron IgM Instrument Value      <30.0 AU/mL <8.0   CMV Antibody IgM      Negative Negative   EBV Caron to Early Ag IgG Instrument Value      0.0 - 9.0 U/mL 15.0 (H)   EBV Antibody to Early Antigen IgG      No detectable antibody. Positive (A)   EBV Capsid Caron IgG Instrument Value      <18.0 U/mL 210.0 (H)   EBV Capsid Antibody IgG      No detectable antibody. Positive (A)   EBV Capsid Caron IgM Instrument Value      <36.0 U/mL <10.0   EBV Capsid Antibody IgM      No detectable antibody. No detectable antibody.   EBV Nuclear Ag Caron IgG Instrument Value      <18.0 U/mL >600.0 (H)   EBV Nuclear Antigen (EBNA) Antibody IgG      No detectable antibody. Positive (A)      Component      Latest Ref Rng & Units 1/29/2023   HSV Type 1 PCR      Negative Negative   HSV Type 2 PCR      Negative Negative   CMV Quant IU/mL      Not Detected IU/mL Not Detected   EBV DNA Copies/mL      Not Detected copies/mL Not Detected          Imaging  1/30 LETA  Interpretation Summary     1. No intracardiac mass or thrombus.  2. Normal left ventricular size and systolic function. LVEF 60-65%.  3. Normal right ventricular size and systolic function.  4. Trileaflet aortic valve sclerosis with trace regurgitation, no stenosis.  5. No  evidence of aortic valve vegetation or aortic dissection, noted on  yesterday's transthoracic echocardiogram.    1/28 TTE  Interpretation Summary     There is mild to moderate concentric left ventricular hypertrophy.  Left ventricular systolic function is normal.  The right ventricle is mildly dilated.  Mildly decreased right ventricular systolic function  IVC diameter >2.1 cm collapsing <50% with sniff suggests a high RA pressure  estimated at 15 mmHg or greater.  Aortic valve not well seen but it is abnormal. Likely trileaflet with  sclerosis/stenosis  Mild to moderate valvular aortic stenosis.  No aortic regurgitation is present.  ABNORMAL FINDING- the ascending aorta at sinotubular ridge is not well seen  but there is eiither artifact vs possible dissection vs vegetation/flap. REC-  LETA if possible or at least CT aortogram (this echo report called to silver)  NSR with frequent ectopy  Technically difficult, suboptimal study.  ______________________________________________________________________________     1/28 Ct chest abdomen pelvis  EXAM: CT CHEST ABDOMEN PELVIS W/O CONTRAST  LOCATION: [facility]  DATE/TIME: 1/29/2023 12:32 AM     INDICATION: Sepsis.  COMPARISON: Chest CT from 7/21/2022 and 5/24/2022.  TECHNIQUE: CT scan of the chest, abdomen, and pelvis was performed without IV contrast. Multiplanar reformats were obtained. Dose reduction techniques were used.   CONTRAST: None.     FINDINGS:   LUNGS AND PLEURA: Patchy mostly linear opacities left lower lobe inferiorly favored to be due to some chronic scarring and/or atelectasis and similar to previous. Additional scattered areas of mild subpleural scarring or atelectasis elsewhere in the   periphery of the lungs. No definite acute appearing infiltrates or consolidation. Central airways are clear. Small chronic appearing left pleural effusion is unchanged.     MEDIASTINUM/AXILLAE: Small amount of fluid adjacent to the distal  esophagus of uncertain significance. Small hiatal hernia. Scattered small posterior mediastinal lymph nodes probably reactive in nature. Normal heart size. No pericardial effusion. Normal   caliber thoracic aorta. Axillary regions are unremarkable.     CORONARY ARTERY CALCIFICATION: Mild.     HEPATOBILIARY: Liver is negative. Cholelithiasis. No biliary dilatation.     PANCREAS: Normal.     SPLEEN: Normal.     ADRENAL GLANDS: 3 cm right adrenal nodule is technically indeterminate on this study but unchanged compared to 05/24/2022 and compatible with an adrenal adenoma based off an older MRI scan by report. Left adrenal gland is negative.     KIDNEYS/BLADDER: Normal.     BOWEL: Bowel is normal in caliber with no evidence for obstruction. Question some mild edema seen around the second and third portions of the duodenum, nonspecific. Normal appendix. Scattered colonic diverticuli without evidence for diverticulitis. No   acute bowel findings.     LYMPH NODES: Normal.     VASCULATURE: Unremarkable.     PELVIC ORGANS: Normal.     MUSCULOSKELETAL: Mild to moderate degenerative changes throughout the spine. Right TAWNY. Moderate size fat-containing umbilical hernia.                                                                      IMPRESSION:  1.  Areas of mostly linear scarring and/or atelectasis in the lungs greatest in the left lung base. Nothing definite for acute pneumonitis.     2.  Stable chronic appearing small left pleural effusion.     3.  Small amount of fluid seen adjacent to the distal thoracic esophagus of uncertain significance. Scattered nonspecific posterior mediastinal lymph nodes along the course of the esophagus. These findings are of uncertain significance or etiology.   Correlation for any signs of esophagitis or other inflammatory process.     4.  Cholelithiasis. No biliary dilatation.     5.  Question small amount of edema seen around the second and third portions of the duodenum. Clinical  correlation to exclude any signs of a duodenal inflammatory process. Correlation with pancreatic enzymes also suggested to exclude pancreatitis.     6.  3 cm right adrenal nodule is technically indeterminate on this study but unchanged from prior CT scan and reportedly represents a benign adrenal adenoma based off a prior MRI.     7.  Scattered colonic diverticuli without evidence for diverticulitis. Normal appendix

## 2023-02-02 ENCOUNTER — APPOINTMENT (OUTPATIENT)
Dept: SPEECH THERAPY | Facility: CLINIC | Age: 63
DRG: 871 | End: 2023-02-02
Payer: COMMERCIAL

## 2023-02-02 ENCOUNTER — ANESTHESIA EVENT (OUTPATIENT)
Dept: GASTROENTEROLOGY | Facility: CLINIC | Age: 63
DRG: 871 | End: 2023-02-02
Payer: COMMERCIAL

## 2023-02-02 ENCOUNTER — APPOINTMENT (OUTPATIENT)
Dept: PHYSICAL THERAPY | Facility: CLINIC | Age: 63
DRG: 871 | End: 2023-02-02
Attending: HOSPITALIST
Payer: COMMERCIAL

## 2023-02-02 LAB
ANION GAP SERPL CALCULATED.3IONS-SCNC: 10 MMOL/L (ref 7–15)
BLD PROD TYP BPU: NORMAL
BLOOD COMPONENT TYPE: NORMAL
BUN SERPL-MCNC: 41.4 MG/DL (ref 8–23)
CALCIUM SERPL-MCNC: 8.5 MG/DL (ref 8.8–10.2)
CHLORIDE SERPL-SCNC: 107 MMOL/L (ref 98–107)
CODING SYSTEM: NORMAL
CREAT SERPL-MCNC: 1.79 MG/DL (ref 0.51–0.95)
DEPRECATED HCO3 PLAS-SCNC: 24 MMOL/L (ref 22–29)
E CHAFFEENSIS IGG TITR SER IF: NORMAL {TITER}
E CHAFFEENSIS IGM TITR SER IF: NORMAL {TITER}
ERYTHROCYTE [DISTWIDTH] IN BLOOD BY AUTOMATED COUNT: 14.6 % (ref 10–15)
GFR SERPL CREATININE-BSD FRML MDRD: 32 ML/MIN/1.73M2
GLUCOSE SERPL-MCNC: 99 MG/DL (ref 70–99)
HCT VFR BLD AUTO: 22.4 % (ref 35–47)
HGB BLD-MCNC: 7.2 G/DL (ref 11.7–15.7)
INR PPP: 2.67 (ref 0.85–1.15)
ISSUE DATE AND TIME: NORMAL
MCH RBC QN AUTO: 28.3 PG (ref 26.5–33)
MCHC RBC AUTO-ENTMCNC: 32.1 G/DL (ref 31.5–36.5)
MCV RBC AUTO: 88 FL (ref 78–100)
PLATELET # BLD AUTO: 39 10E3/UL (ref 150–450)
POTASSIUM SERPL-SCNC: 3.3 MMOL/L (ref 3.4–5.3)
RBC # BLD AUTO: 2.54 10E6/UL (ref 3.8–5.2)
SODIUM SERPL-SCNC: 141 MMOL/L (ref 136–145)
UNIT ABO/RH: NORMAL
UNIT NUMBER: NORMAL
UNIT STATUS: NORMAL
UNIT TYPE ISBT: 6200
WBC # BLD AUTO: 0.4 10E3/UL (ref 4–11)

## 2023-02-02 PROCEDURE — 92526 ORAL FUNCTION THERAPY: CPT | Mod: GN | Performed by: SPEECH-LANGUAGE PATHOLOGIST

## 2023-02-02 PROCEDURE — 250N000013 HC RX MED GY IP 250 OP 250 PS 637: Performed by: SPECIALIST

## 2023-02-02 PROCEDURE — P9035 PLATELET PHERES LEUKOREDUCED: HCPCS

## 2023-02-02 PROCEDURE — 250N000013 HC RX MED GY IP 250 OP 250 PS 637: Performed by: INTERNAL MEDICINE

## 2023-02-02 PROCEDURE — 250N000013 HC RX MED GY IP 250 OP 250 PS 637: Performed by: HOSPITALIST

## 2023-02-02 PROCEDURE — G0463 HOSPITAL OUTPT CLINIC VISIT: HCPCS

## 2023-02-02 PROCEDURE — 85610 PROTHROMBIN TIME: CPT | Performed by: HOSPITALIST

## 2023-02-02 PROCEDURE — 97161 PT EVAL LOW COMPLEX 20 MIN: CPT | Mod: GP

## 2023-02-02 PROCEDURE — 36415 COLL VENOUS BLD VENIPUNCTURE: CPT | Performed by: HOSPITALIST

## 2023-02-02 PROCEDURE — 99233 SBSQ HOSP IP/OBS HIGH 50: CPT | Performed by: INTERNAL MEDICINE

## 2023-02-02 PROCEDURE — 250N000013 HC RX MED GY IP 250 OP 250 PS 637: Performed by: NURSE PRACTITIONER

## 2023-02-02 PROCEDURE — P9037 PLATE PHERES LEUKOREDU IRRAD: HCPCS | Performed by: HOSPITALIST

## 2023-02-02 PROCEDURE — 80048 BASIC METABOLIC PNL TOTAL CA: CPT | Performed by: HOSPITALIST

## 2023-02-02 PROCEDURE — 250N000011 HC RX IP 250 OP 636: Performed by: HOSPITALIST

## 2023-02-02 PROCEDURE — 250N000009 HC RX 250: Performed by: INTERNAL MEDICINE

## 2023-02-02 PROCEDURE — 258N000002 HC RX IP 258 OP 250: Performed by: INTERNAL MEDICINE

## 2023-02-02 PROCEDURE — 99233 SBSQ HOSP IP/OBS HIGH 50: CPT | Performed by: HOSPITALIST

## 2023-02-02 PROCEDURE — 120N000001 HC R&B MED SURG/OB

## 2023-02-02 PROCEDURE — 99231 SBSQ HOSP IP/OBS SF/LOW 25: CPT | Performed by: INTERNAL MEDICINE

## 2023-02-02 PROCEDURE — 85027 COMPLETE CBC AUTOMATED: CPT | Performed by: HOSPITALIST

## 2023-02-02 PROCEDURE — 250N000011 HC RX IP 250 OP 636: Performed by: INTERNAL MEDICINE

## 2023-02-02 PROCEDURE — 97110 THERAPEUTIC EXERCISES: CPT | Mod: GP

## 2023-02-02 PROCEDURE — C9113 INJ PANTOPRAZOLE SODIUM, VIA: HCPCS | Performed by: HOSPITALIST

## 2023-02-02 RX ORDER — HYDROMORPHONE HCL IN WATER/PF 6 MG/30 ML
0.2 PATIENT CONTROLLED ANALGESIA SYRINGE INTRAVENOUS EVERY 6 HOURS PRN
Status: COMPLETED | OUTPATIENT
Start: 2023-02-02 | End: 2023-02-03

## 2023-02-02 RX ORDER — LEUCOVORIN CALCIUM 350 MG/17.5ML
100 INJECTION, POWDER, LYOPHILIZED, FOR SOLUTION INTRAMUSCULAR; INTRAVENOUS EVERY 6 HOURS
Status: DISCONTINUED | OUTPATIENT
Start: 2023-02-02 | End: 2023-02-05

## 2023-02-02 RX ADMIN — PANTOPRAZOLE SODIUM 40 MG: 40 INJECTION, POWDER, FOR SOLUTION INTRAVENOUS at 05:33

## 2023-02-02 RX ADMIN — GABAPENTIN 100 MG: 100 CAPSULE ORAL at 08:45

## 2023-02-02 RX ADMIN — FOLIC ACID 2 MG: 1 TABLET ORAL at 08:45

## 2023-02-02 RX ADMIN — HYDROMORPHONE HYDROCHLORIDE 0.2 MG: 0.2 INJECTION, SOLUTION INTRAMUSCULAR; INTRAVENOUS; SUBCUTANEOUS at 21:19

## 2023-02-02 RX ADMIN — ATORVASTATIN CALCIUM 40 MG: 40 TABLET, FILM COATED ORAL at 20:43

## 2023-02-02 RX ADMIN — SODIUM BICARBONATE: 84 INJECTION, SOLUTION INTRAVENOUS at 21:01

## 2023-02-02 RX ADMIN — MICONAZOLE NITRATE: 2 POWDER TOPICAL at 08:51

## 2023-02-02 RX ADMIN — OXYCODONE HYDROCHLORIDE 2.5 MG: 5 TABLET ORAL at 14:22

## 2023-02-02 RX ADMIN — MICONAZOLE NITRATE: 2 POWDER TOPICAL at 21:44

## 2023-02-02 RX ADMIN — FLUCONAZOLE 100 MG: 100 TABLET ORAL at 08:45

## 2023-02-02 RX ADMIN — CYANOCOBALAMIN 1000 MCG: 1000 INJECTION, SOLUTION INTRAMUSCULAR at 08:45

## 2023-02-02 RX ADMIN — LEUCOVORIN CALCIUM 2450 MG: 350 INJECTION, POWDER, LYOPHILIZED, FOR SOLUTION INTRAMUSCULAR; INTRAVENOUS at 02:10

## 2023-02-02 RX ADMIN — OXYCODONE HYDROCHLORIDE 2.5 MG: 5 TABLET ORAL at 22:22

## 2023-02-02 RX ADMIN — DOXYCYCLINE HYCLATE 100 MG: 100 CAPSULE ORAL at 20:43

## 2023-02-02 RX ADMIN — ACETAMINOPHEN 650 MG: 325 TABLET, FILM COATED ORAL at 05:33

## 2023-02-02 RX ADMIN — CEFEPIME HYDROCHLORIDE 2 G: 2 INJECTION, POWDER, FOR SOLUTION INTRAVENOUS at 00:13

## 2023-02-02 RX ADMIN — FILGRASTIM 480 MCG: 480 INJECTION, SOLUTION INTRAVENOUS; SUBCUTANEOUS at 20:43

## 2023-02-02 RX ADMIN — DIPHENHYDRAMINE HYDROCHLORIDE AND LIDOCAINE HYDROCHLORIDE AND ALUMINUM HYDROXIDE AND MAGNESIUM HYDRO 10 ML: KIT at 08:45

## 2023-02-02 RX ADMIN — OXYCODONE HYDROCHLORIDE 2.5 MG: 5 TABLET ORAL at 11:08

## 2023-02-02 RX ADMIN — NYSTATIN 500000 UNITS: 100000 SUSPENSION ORAL at 18:41

## 2023-02-02 RX ADMIN — GABAPENTIN 100 MG: 100 CAPSULE ORAL at 20:43

## 2023-02-02 RX ADMIN — GABAPENTIN 100 MG: 100 CAPSULE ORAL at 13:58

## 2023-02-02 RX ADMIN — DULOXETINE HYDROCHLORIDE 120 MG: 60 CAPSULE, DELAYED RELEASE ORAL at 08:45

## 2023-02-02 RX ADMIN — LEUCOVORIN CALCIUM 245 MG: 350 INJECTION, POWDER, LYOPHILIZED, FOR SOLUTION INTRAMUSCULAR; INTRAVENOUS at 15:53

## 2023-02-02 RX ADMIN — LEUCOVORIN CALCIUM 2450 MG: 350 INJECTION, POWDER, LYOPHILIZED, FOR SOLUTION INTRAMUSCULAR; INTRAVENOUS at 10:09

## 2023-02-02 RX ADMIN — NYSTATIN 500000 UNITS: 100000 SUSPENSION ORAL at 08:44

## 2023-02-02 RX ADMIN — ARIPIPRAZOLE 5 MG: 5 TABLET ORAL at 08:45

## 2023-02-02 RX ADMIN — NYSTATIN 500000 UNITS: 100000 SUSPENSION ORAL at 12:32

## 2023-02-02 RX ADMIN — SODIUM BICARBONATE: 84 INJECTION, SOLUTION INTRAVENOUS at 04:14

## 2023-02-02 RX ADMIN — CEFEPIME HYDROCHLORIDE 2 G: 2 INJECTION, POWDER, FOR SOLUTION INTRAVENOUS at 12:32

## 2023-02-02 RX ADMIN — DIPHENHYDRAMINE HYDROCHLORIDE AND LIDOCAINE HYDROCHLORIDE AND ALUMINUM HYDROXIDE AND MAGNESIUM HYDRO 10 ML: KIT at 18:41

## 2023-02-02 RX ADMIN — OXYCODONE HYDROCHLORIDE 2.5 MG: 5 TABLET ORAL at 05:33

## 2023-02-02 RX ADMIN — LEUCOVORIN CALCIUM 245 MG: 350 INJECTION, POWDER, LYOPHILIZED, FOR SOLUTION INTRAMUSCULAR; INTRAVENOUS at 22:28

## 2023-02-02 RX ADMIN — PANTOPRAZOLE SODIUM 40 MG: 40 INJECTION, POWDER, FOR SOLUTION INTRAVENOUS at 18:40

## 2023-02-02 RX ADMIN — DOXYCYCLINE HYCLATE 100 MG: 100 CAPSULE ORAL at 08:45

## 2023-02-02 RX ADMIN — NYSTATIN 500000 UNITS: 100000 SUSPENSION ORAL at 22:22

## 2023-02-02 ASSESSMENT — ACTIVITIES OF DAILY LIVING (ADL)
ADLS_ACUITY_SCORE: 50
ADLS_ACUITY_SCORE: 56
ADLS_ACUITY_SCORE: 56
ADLS_ACUITY_SCORE: 50
ADLS_ACUITY_SCORE: 56
ADLS_ACUITY_SCORE: 50
ADLS_ACUITY_SCORE: 56
ADLS_ACUITY_SCORE: 56
ADLS_ACUITY_SCORE: 50
ADLS_ACUITY_SCORE: 56
ADLS_ACUITY_SCORE: 50
ADLS_ACUITY_SCORE: 56

## 2023-02-02 NOTE — PROGRESS NOTES
[facility]  Hematology-Oncology Follow-up Note      Today's Date:February 2, 2023   Date of Admission:  1/28/2023  Reason for Consult: Pancytopenia with severe B12 and folate deficiencies      ASSESSMENT:  Shira Rodriguez is a 62 year old female who has been admitted with septic shock and neutropenic fevers    Neutropenic fever   Bacterial sepsis  Acute kidney injury/ acute renal failure  Pancytopenia with severe neutropenia, moderate anemia, with severe B12 and folate deficiency  Morbid obesity    It is quite odd that despite correcting the severe B12 and folate deficiencies Shira has not shown much improvement in terms of her cytopenias or general health. I have requested a bone marrow biopsy which is scheduled for tomorrow. Her platelet counts have been staying below 50k. She has not been getting her coumadin since admission but her INR have been well within therapeutic range. We will continue to hold administering any further coumadin.     I have extensively reviewed the procedure for bone marrow biopsy with her. This is performed under sedation and with local anaesethetic. Bone marrow is the soft tissue inside bones that helps form blood cells. It is found in the hollow part of most bones. The procedure involves collection of bone marrow and a small amount of marrow fluid aspirate for analysis with a biopsy/aspirate needle. Only the finished cells are released in the circulation and bone marrow biopsy/aspirate helps assess precursor cells for abnormalities. It helps establish primary hematologic disorders like myelodysplastic/ myeloproliferative disorders, leukemia, lymphoma and multiple myeloma.  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    I reminded her to be n.p.o. for the procedure.  Her Coumadin has been held during this admission but her INR have remained well within therapeutic range.      RECOMMENDATIONS:  - Bone marrow biopsy tomorrow 2/3/23 at  noon;  since she has not shown any improvement despite B12 and folate supplementation.    she should be NPO except meds for procedure after 6 am  - Continue to replace vitamin B12 and folate   Vitamin B12 1000 mcg daily x 5 and then weekly (today is day 5)   Folate 5 mg on day 1 and then 2 mg daily  -  Methotrexate undetectable. Still it is hard to rule out methotrexate toxicity. I will start her on leucovorin She is getting folinic acid - started off with 1000 mg/m  x 2 doses (done for high dose methotrexate toxicity); will drop to 100 mg/m  now.   - Recommend platelet transfusion to keep platelet counts > 50k with therapeutic INR and nose bleeds   She had bilateral life threatening pulmonary embolism with cardiac arrest and now has d-dimer of 7.38  - Continue neupogen for severe neutropenia with sepsis - 480 mcg daily until ANC > 1500  - Check CBC with diff daily  - Patient complains of mouth sores. With severe neutropenia and ongoing antibiotics she is at risk for fungal infections.    She has been started on nystatin and fluconazole.   - Agree with management as per primary; LETA has been negative      Over 50 min spent on day of visit including review of tests, obtaining/reviewing separately obtained history/physical exam, counseling patient, ordering medications/tests/procedures, communicating with PCP/consultants, and documenting in electronic medical record.    Julio César Hills  Hematologist and Medical Oncologist  [facility]      INTERIM HISTORY:  Shira was followed today and was with her niece in her room. She is not feeling any better as he had but was happy to see her niece. She again had nose bleeds earlier in the day.      MEDICATIONS:  Current Facility-Administered Medications   Medication     acetaminophen (TYLENOL) tablet 650 mg    Or     acetaminophen (TYLENOL) Suppository 650 mg     ARIPiprazole (ABILIFY) tablet 5 mg     atorvastatin (LIPITOR) tablet 40 mg     benzocaine-menthol (CHLORASEPTIC)  6-10 MG lozenge 1 lozenge     ceFEPIme (MAXIPIME) 2 g vial to attach to  ml bag for ADULTS or 50 ml bag for PEDS     cyanocobalamin injection 1,000 mcg     doxycycline hyclate (VIBRAMYCIN) capsule 100 mg     DULoxetine (CYMBALTA) DR capsule 120 mg     filgrastim (NEUPOGEN) injection syringe SOSY 480 mcg     fluconazole (DIFLUCAN) tablet 100 mg     flumazenil (ROMAZICON) injection 0.2 mg     folic acid (FOLVITE) tablet 2 mg     gabapentin (NEURONTIN) capsule 100 mg     Give   of usual dose of LONG ACTING insulin AM of procedure IF diabetic     HOLD: Insulin - RAPID/SHORT acting AM of procedure IF diabetic     HOLD: Insulin - REGULAR AM of procedure IF diabetic     HOLD: Oral hypoglycemics AM of procedure IF diabetic     ipratropium - albuterol 0.5 mg/2.5 mg/3 mL (DUONEB) neb solution 3 mL     leucovorin calcium injection 2,450 mg     lidocaine (LMX4) cream     lidocaine 1 % 0.1-1 mL     magic mouthwash suspension (diphenhydramine, lidocaine, aluminum-magnesium & simethicone)     May take oral meds with a sip of water, the morning of LETA procedure.     melatonin tablet 3 mg     miconazole (MICATIN) 2 % powder     NaCl 0.45 % 1,000 mL with sodium bicarbonate 75 mEq/L infusion     naloxone (NARCAN) injection 0.2 mg    Or     naloxone (NARCAN) injection 0.4 mg    Or     naloxone (NARCAN) injection 0.2 mg    Or     naloxone (NARCAN) injection 0.4 mg     nitroGLYcerin (NITROSTAT) sublingual tablet 0.4 mg     nystatin (MYCOSTATIN) suspension 500,000 Units     ondansetron (ZOFRAN ODT) ODT tab 4 mg    Or     ondansetron (ZOFRAN) injection 4 mg     oxyCODONE IR (ROXICODONE) half-tab 2.5 mg     pantoprazole (PROTONIX) IV push injection 40 mg     senna-docusate (SENOKOT-S/PERICOLACE) 8.6-50 MG per tablet 1 tablet    Or     senna-docusate (SENOKOT-S/PERICOLACE) 8.6-50 MG per tablet 2 tablet     sodium chloride (PF) 0.9% PF flush 3 mL     sodium chloride (PF) 0.9% PF flush 3 mL     sodium chloride (PF) 0.9% PF flush 3 mL      "sodium chloride (PF) 0.9% PF flush 3 mL     sodium chloride (PF) 0.9% PF flush 3 mL     sodium chloride (PF) 0.9% PF flush 3 mL     sodium chloride (PF) 0.9% PF flush 3 mL     WARFARIN THERAPY REMINDER     Facility-Administered Medications Ordered in Other Encounters   Medication     sodium chloride (PF) 0.9% PF flush 10 mL           ALLERGIES:  Allergies   Allergen Reactions     Adhesive Tape Blisters     Erythromycin Rash         PHYSICAL EXAM:  Vital signs:  Temp: 99  F (37.2  C) Temp src: Axillary BP: 105/66 Pulse: 97   Resp: 18 SpO2: 94 % O2 Device: None (Room air) Oxygen Delivery: 2 LPM Height: 167.6 cm (5' 6\") Weight: 133.8 kg (295 lb)  Estimated body mass index is 47.61 kg/m  as calculated from the following:    Height as of this encounter: 1.676 m (5' 6\").    Weight as of this encounter: 133.8 kg (295 lb).    LABS:  Recent Labs   Lab Test 02/01/23  0734 01/31/23  1133 01/31/23  0846 01/31/23 0757 01/30/23 1217 01/29/23  0626 01/28/23 2221     --   --  141 138 135* 132*   POTASSIUM 3.4  --   --  3.4 3.6 4.6 5.2   CHLORIDE 114*  --   --  111* 112* 108* 103   CO2 20*  --   --  17* 14* 12* 13*   ANIONGAP 10  --   --  13 12 15 16*   BUN 43.9*  --   --  44.3* 47.6* 54.6* 55.5*   CR 2.41*  --   --  2.52* 2.65* 2.88* 3.25*   GLC 91 84 85 86 82 73 84   MARIA INES 8.0*  --   --  8.1* 8.2* 8.4* 8.9     Recent Labs   Lab Test 02/01/23  0734 01/31/23  0757 01/30/23  2043 01/30/23  1217 01/28/23  2221 10/05/19  0521 10/04/19  0704 10/03/19  2049 10/03/19  0604 10/02/19  0545 10/01/19  0548 09/30/19  0625   MAG 2.0 2.2 2.4* 1.5* 1.4*   < > 2.0   < > 2.1 2.2 1.9 2.2   PHOS  --   --   --   --   --   --  4.2  --  4.3 4.4 3.9 4.0    < > = values in this interval not displayed.     Recent Labs   Lab Test 02/02/23  0800 02/01/23  0734 01/31/23  1009 01/31/23  0838 01/30/23  0237 01/29/23  0626 01/28/23  2221 12/07/22  1340 12/06/22  1049 05/24/22  0959 01/13/21  1419 09/24/20  1415   WBC 0.4* 0.3* 0.2* 0.2* 0.2*   < > 0.4* " 10.6 15.4* 10.0   < > 6.8   HGB 7.2* 7.2* 6.9* 6.5* 7.8*   < > 9.4* 13.4 13.9 14.9   < > 15.0   PLT 39* 25* 24* 23* 20*   < > 59* 271 369 290   < > 146*   MCV 88 87 87 87 88   < > 90 88 89 96   < > 91   NEUTROPHIL  --   --   --   --   --   --  24 78 86 75  --  77.7    < > = values in this interval not displayed.     Recent Labs   Lab Test 01/29/23  1827 01/28/23  2221 12/06/22  1049 10/24/22  1138 05/24/22  0959 10/06/19  0702 10/05/19  0521   BILITOTAL  --  0.7 0.3  --  0.4   < > 0.4   ALKPHOS  --  57 65  --  70   < > 65   ALT  --  13 12  --  15   < > 18   AST  --  28 12  --  14   < > 12   ALBUMIN  --  2.5* 3.6  --  3.5   < > 2.8*     --   --  300*  --   --  202    < > = values in this interval not displayed.     TSH   Date Value Ref Range Status   12/06/2022 1.60 0.30 - 4.20 uIU/mL Final   05/24/2022 2.23 0.40 - 4.00 mU/L Final   06/16/2021 1.43 0.40 - 4.00 mU/L Final   09/24/2020 1.59 0.40 - 4.00 mU/L Final   06/12/2020 1.49 0.40 - 4.00 mU/L Final     No results for input(s): CEA in the last 25639 hours.  Results for orders placed or performed during the hospital encounter of 01/28/23   XR Chest Port 1 View    Narrative    EXAM: XR CHEST PORT 1 VIEW  LOCATION: [facility]  DATE/TIME: 1/28/2023 11:57 PM    INDICATION: Sepsis.  COMPARISON: 12/06/2022      Impression    IMPRESSION:     No focal airspace disease. No pleural effusion or pneumothorax.    The cardiomediastinal silhouette is unremarkable. Aortic calcifications.   CT Head w/o Contrast    Narrative    EXAM: CT HEAD W/O CONTRAST  LOCATION: [facility]  DATE/TIME: 1/29/2023 12:33 AM    INDICATION: Delirium and unresponsiveness  COMPARISON:  CT head 09/10/2019.  TECHNIQUE: Routine CT Head without IV contrast. Multiplanar reformats. Dose reduction techniques were used.    FINDINGS:  INTRACRANIAL CONTENTS: No intracranial hemorrhage, extraaxial collection, or mass effect.  No CT evidence of acute infarct.  Mild presumed chronic small vessel ischemic changes. Mild generalized volume loss. No hydrocephalus.     VISUALIZED ORBITS/SINUSES/MASTOIDS: No intraorbital abnormality. No significant paranasal sinus mucosal disease. No middle ear or mastoid effusion.    BONES/SOFT TISSUES: No acute abnormality.      Impression    IMPRESSION:  1.  No CT evidence for acute intracranial process.  2.  Brain atrophy and presumed chronic microvascular ischemic changes as above.   CT Chest Abdomen Pelvis w/o Contrast    Narrative    EXAM: CT CHEST ABDOMEN PELVIS W/O CONTRAST  LOCATION: [facility]  DATE/TIME: 1/29/2023 12:32 AM    INDICATION: Sepsis.  COMPARISON: Chest CT from 7/21/2022 and 5/24/2022.  TECHNIQUE: CT scan of the chest, abdomen, and pelvis was performed without IV contrast. Multiplanar reformats were obtained. Dose reduction techniques were used.   CONTRAST: None.    FINDINGS:   LUNGS AND PLEURA: Patchy mostly linear opacities left lower lobe inferiorly favored to be due to some chronic scarring and/or atelectasis and similar to previous. Additional scattered areas of mild subpleural scarring or atelectasis elsewhere in the   periphery of the lungs. No definite acute appearing infiltrates or consolidation. Central airways are clear. Small chronic appearing left pleural effusion is unchanged.    MEDIASTINUM/AXILLAE: Small amount of fluid adjacent to the distal esophagus of uncertain significance. Small hiatal hernia. Scattered small posterior mediastinal lymph nodes probably reactive in nature. Normal heart size. No pericardial effusion. Normal   caliber thoracic aorta. Axillary regions are unremarkable.    CORONARY ARTERY CALCIFICATION: Mild.    HEPATOBILIARY: Liver is negative. Cholelithiasis. No biliary dilatation.    PANCREAS: Normal.    SPLEEN: Normal.    ADRENAL GLANDS: 3 cm right adrenal nodule is technically indeterminate on this study but unchanged compared to 05/24/2022 and compatible with  an adrenal adenoma based off an older MRI scan by report. Left adrenal gland is negative.    KIDNEYS/BLADDER: Normal.    BOWEL: Bowel is normal in caliber with no evidence for obstruction. Question some mild edema seen around the second and third portions of the duodenum, nonspecific. Normal appendix. Scattered colonic diverticuli without evidence for diverticulitis. No   acute bowel findings.    LYMPH NODES: Normal.    VASCULATURE: Unremarkable.    PELVIC ORGANS: Normal.    MUSCULOSKELETAL: Mild to moderate degenerative changes throughout the spine. Right TAWNY. Moderate size fat-containing umbilical hernia.      Impression    IMPRESSION:  1.  Areas of mostly linear scarring and/or atelectasis in the lungs greatest in the left lung base. Nothing definite for acute pneumonitis.    2.  Stable chronic appearing small left pleural effusion.    3.  Small amount of fluid seen adjacent to the distal thoracic esophagus of uncertain significance. Scattered nonspecific posterior mediastinal lymph nodes along the course of the esophagus. These findings are of uncertain significance or etiology.   Correlation for any signs of esophagitis or other inflammatory process.    4.  Cholelithiasis. No biliary dilatation.    5.  Question small amount of edema seen around the second and third portions of the duodenum. Clinical correlation to exclude any signs of a duodenal inflammatory process. Correlation with pancreatic enzymes also suggested to exclude pancreatitis.    6.  3 cm right adrenal nodule is technically indeterminate on this study but unchanged from prior CT scan and reportedly represents a benign adrenal adenoma based off a prior MRI.    7.  Scattered colonic diverticuli without evidence for diverticulitis. Normal appendix.   Echocardiogram Complete     Value    LVEF  55-60%    Narrative    [id number]  [id number]  [id number]  [id number]^MAAME^FEDERICO^OMARI     [facility]  Echocardiography Laboratory  [facility] [place] [address]     Name: XAVIER ROWELL  MRN: [id number]  : 1960  Study Date: 2023 10:46 AM  Age: 62 yrs  Gender: Female  Patient Location: [facility]  Reason For Study: CHF  Ordering Physician: FEDERICO ISABEL  Referring Physician: Anjel Busby  Performed By: Emma Foster     BSA: 2.3 m2  Height: 66 in  Weight: 284 lb  HR: 104  BP: 132/72 mmHg  ______________________________________________________________________________  Procedure  Complete Portable Echo Adult. Optison (NDC #[id number]) given intravenously.  ______________________________________________________________________________  Interpretation Summary     There is mild to moderate concentric left ventricular hypertrophy.  Left ventricular systolic function is normal.  The right ventricle is mildly dilated.  Mildly decreased right ventricular systolic function  IVC diameter >2.1 cm collapsing <50% with sniff suggests a high RA pressure  estimated at 15 mmHg or greater.  Aortic valve not well seen but it is abnormal. Likely trileaflet with  sclerosis/stenosis  Mild to moderate valvular aortic stenosis.  No aortic regurgitation is present.  ABNORMAL FINDING- the ascending aorta at sinotubular ridge is not well seen  but there is eiither artifact vs possible dissection vs vegetation/flap. REC-  LETA if possible or at least CT aortogram (this echo report called to silver)  NSR with frequent ectopy  Technically difficult, suboptimal study.  ______________________________________________________________________________  Left Ventricle  The left ventricle is normal in size. There is mild to moderate concentric  left ventricular hypertrophy. Left ventricular systolic function is normal.  The visual ejection fraction is 55-60%. Left ventricular diastolic function is  indeterminate. Normal left ventricular wall motion.     Right Ventricle  The right ventricle is mildly dilated. Mildly decreased right ventricular  systolic  function.     Atria  The left atrium is borderline dilated. Right atrial size is normal.     Mitral Valve  There is moderate mitral annular calcification.     Tricuspid Valve  No tricuspid regurgitation. Right ventricular systolic pressure could not be  approximated due to inadequate tricuspid regurgitation. IVC diameter >2.1 cm  collapsing <50% with sniff suggests a high RA pressure estimated at 15 mmHg or  greater.     Aortic Valve  The aortic valve is not well visualized. Aortic valve not well seen but it is  abnormal. Likely trileaflet with sclerosis/stenosis. No aortic regurgitation  is present. Mild to moderate valvular aortic stenosis.     Pulmonic Valve  The pulmonic valve is not well seen, but is grossly normal.     Vessels  The aortic root is not well visualized. ABNORMAL FINDING- the ascending aorta  at sinotubular ridge is not well seen but there is eiither artifact vs  possible dissection vs vegetation/flap. REC- LETA if possible or at least CT  aortogram (this echo report called to adrianne).     Pericardium  The pericardium appears normal.     Rhythm  Sinus rhythm was noted. NSR with frequent ectopy.  ______________________________________________________________________________  MMode/2D Measurements & Calculations  IVSd: 1.5 cm     LVIDd: 4.9 cm  LVIDs: 3.2 cm  LVPWd: 1.3 cm  FS: 33.9 %  LV mass(C)d: 275.2 grams  LV mass(C)dI: 118.5 grams/m2  Ao root diam: 3.7 cm  LA dimension: 4.5 cm  asc Aorta Diam: 3.3 cm  LA/Ao: 1.2  LVOT diam: 2.4 cm  LVOT area: 4.5 cm2  LA Volume (BP): 70.3 ml  LA Volume Index (BP): 30.3 ml/m2  RWT: 0.53     Doppler Measurements & Calculations  MV E max chris: 77.3 cm/sec  MV A max chris: 99.7 cm/sec  MV E/A: 0.78  MV dec slope: 383.5 cm/sec2  MV dec time: 0.21 sec  Ao V2 max: 333.0 cm/sec  Ao max P.4 mmHg  Ao V2 mean: 236.0 cm/sec  Ao mean P.0 mmHg  Ao V2 VTI: 59.4 cm  MERON(I,D): 1.5 cm2  MERON(V,D): 1.3 cm2  LV V1 max PG: 3.8 mmHg  LV V1 max: 97.7 cm/sec  LV V1 VTI: 20.3  cm  SV(LVOT): 91.8 ml  SI(LVOT): 39.6 ml/m2  PA acc time: 0.10 sec  TR max emile: 250.0 cm/sec  TR max P.0 mmHg  AV Emile Ratio (DI): 0.29  MERON Index (cm2/m2): 0.67  E/E' av.5  Lateral E/e': 11.8  Medial E/e': 11.1     ______________________________________________________________________________  Report approved by: Tariq Doty 2023 01:25 PM         Transesophageal Echocardiogram     Value    LVEF  60-65%    Narrative    [id number]  [id number]  [id number]  [id number]^SURESH^TAIWO^DASHA     [facility]  Echocardiography Laboratory  [address] [place] [address]     Name: XAVIER ROWELL  MRN: [id number]  : 1960  Study Date: 2023 02:57 PM  Age: 62 yrs  Gender: Female  Patient Location: [facility]  Reason For Study: Endocarditis  Ordering Physician: TAIWO NESS  Referring Physician: TAIWO NESS  Performed By: KEATON Pinto     BSA: 2.3 m2  Height: 66 in  Weight: 284 lb  HR: 122  BP: 141/63 mmHg  ______________________________________________________________________________  Procedure  Complete LETA Adult. 3D image acquisition, reconstruction, and real-time  interpretation was performed.  ______________________________________________________________________________  Interpretation Summary     1. No intracardiac mass or thrombus.  2. Normal left ventricular size and systolic function. LVEF 60-65%.  3. Normal right ventricular size and systolic function.  4. Trileaflet aortic valve sclerosis with trace regurgitation, no stenosis.  5. No evidence of aortic valve vegetation or aortic dissection, noted on  yesterday's transthoracic echocardiogram.     Findings discussed with Dr. Ness.     ______________________________________________________________________________  LETA  Versed (3mg) was given intravenously. Fentanyl (100mcg) was given  intravenously. I determined this patient to be an appropriate candidate for  the planned sedation and procedure and have  reassessed the patient immediately  prior to sedation and procedure. Total sedation time: 12 minutes of continuous  bedside 1:1 monitoring. Prior to the exam, the oral cavity was checked and no  overcrowding was noted. Consent to the procedure was obtained prior to  sedation. The transesophageal probe was passed without difficulty. There were  no complications associated with this procedure.     Left Ventricle  The left ventricle is normal in size. Left ventricular systolic function is  normal. The visual ejection fraction is 60-65%. No regional wall motion  abnormalities noted. There is no thrombus seen in the left ventricle.     Right Ventricle  The right ventricle is normal size. The right ventricular systolic function is  normal. There is no mass or thrombus in the right ventricle.     Atria  Normal left atrial size. No left atrial mass or thrombus visualized. Right  atrial size is normal. No evidence of right atrial mass/thrombus. Intact  atrial septum. There is no color Doppler evidence of an atrial shunt. The left  atrial appendage was well visualized and free of thrombus.     Mitral Valve  The mitral valve leaflets appear normal. There is no evidence of stenosis,  fluttering, or prolapse. There is trace mitral regurgitation. There is no  mitral valve stenosis.     Tricuspid Valve  Normal tricuspid valve. There is trace tricuspid regurgitation.     Aortic Valve  The aortic valve is trileaflet with aortic valve sclerosis. There is moderate  aortic sclerosis of the left coronary cusp. There is no vegetation on the  aortic valve. There is trace aortic regurgitation. No hemodynamically  significant valvular aortic stenosis.     Pulmonic Valve  Normal pulmonic valve. There is trace pulmonic valvular regurgitation.     Vessels  The aortic root is normal size. Normal size ascending aorta. Normal ascending,  transverse (arch), and descending aorta. Normal pulmonary venous drainage.     Pericardial/Pleural  There is  no pericardial effusion.     Rhythm  Sinus rhythm was noted.  ______________________________________________________________________________  Report approved by: Dr Mazin Bourgeois 01/31/2023 04:47 PM     ______________________________________________________________________________        *Note: Due to a large number of results and/or encounters for the requested time period, some results have not been displayed. A complete set of results can be found in Results Review.

## 2023-02-02 NOTE — PLAN OF CARE
Goal Outcome Evaluation:      Pt A&O x 4, flat affect, withdrawn. Wound cares completed x 1 in the evening to pannus, under breasts, perineum and behind right knee. Skin is macerated and reddened, severely painful to touch despite premedication with oxycodone and tylenol. T/R q2h. Minced/moist diet with mildly thick liquids. Pt has chronic congested cough, reports that she follows with pulmonology outpatient. Purewick in place with good output. PIVs x 3. L PIV infusing 1/2 NS w/ Sodium Bicarb @ 75ml/hr. Leucovorin started q6h, also has intermittent IV abx. New orders for bone marrow biopsy. Hem/onc, ID, Neph, WOC, SLP following- video swallow later once bld/plt improved.

## 2023-02-02 NOTE — PROGRESS NOTES
SPIRITUAL HEALTH SERVICES Progress Note  [facility] [address]    Referral Source: Length of stay    Introduced self and SHS to Shira at bedside and assessed for SH needs. Shira shared that her sister is a  in the [employer] community and Shira feels her sister's support at this time. I offered to keep Shira in my prayers and Shira welcomed this offer.    Plan: Please consult if SH needs arise. SHS remains available.    Michelle Blake MDiv  Chaplain Resident  Pager: [phone]

## 2023-02-02 NOTE — PROVIDER NOTIFICATION
MD Notification    Notified Person: MD    Notified Person Name: Dr. Levy    Notification Date/Time: 02/02/23 09:00 AM    Notification Interaction: [employer] Messaging     Purpose of Notification: FYI pts platlets at 39 in morning labs. Please advise.    Orders Received: Ordered one unit platelets to be given    Comments:

## 2023-02-02 NOTE — PROGRESS NOTES
Nephrology Progress Note  02/02/2023         Assessment & Recommendations:   61-year-old female with rheumatoid arthritis, recently started on methotrexate -admitted with severe pancytopenia, neutropenic fever, intertrigo, severe sepsis, KRISS    1 acute renal failure-normal creatinine as of 2021.  Creatinine was up to 1.7 in May 2022, and more recently around 2.1.  Unclear cause of rising creatinine in 2022.  Attributed to dehydration but never really went down.  -Now admitted with a creatinine of 3.2 in setting of sepsis, hypotension, pancytopenia, poor oral intake, use of lisinopril.  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  Urinalysis with multiple hyaline casts and amorphous crystals consistent with prerenal state.  Relatively bland with 5 RBCs per high-power field, 1+ protein and no casts.  -CT abdomen with normal-looking kidneys.  Excellent urine output.    -Creatinine continues to trend down .  Labs pending from today    2 pancytopenia-hematology on board.  Noted to have severe vitamin B12 deficiency and folate deficiency.  May need bone marrow biopsy.  Several labs are pending.  Methotrexate on hold.  Methotrexate level low.  -Status post Neupogen and and vitamin B12/folic acid  -PRBC and platelet transfusion per primary team    -Noted plans for bone marrow biopsy    3 neutropenic fever-no apparent source of infection.  On cefepime and doxycycline.  -Infectious work-up underway.  - On cefepime     4 intertrigo-on fluconazole.    5 metabolic acidosis-with renal failure.  Improving on sodium bicarbonate.  Urine pH appropriate.    Discussion/recommendation-  -we will follow-up on labs from today  -Cut back on fluid to 50 cc an hour  Encouraged to see improving renal function and good urine output.  Continue to hold lisinopril.  Daily renal panel.      Sarahi Kelley MD  [facility] - Nephrology   [phone]      Interval History :   Seen / examined.   No acute issues overnight.  Noted  "plans for bone marrow biopsy.  Feels tired.   Labs pending from today.  Good UOP   Breathing okay  Mild cough    Review of Systems:   A 4 point review of systems was negative except as noted above.  Notably: poor appetite. no nausea or vomiting or diarrhea.  no confusion,  no fever or chills    Physical Exam:   I/O last 3 completed shifts:  In: 5137.92 [I.V.:4897.92]  Out: 1550 [Urine:1550]  /78   Pulse 104   Temp 97.5  F (36.4  C)   Resp 18   Ht 1.676 m (5' 6\")   Wt 133.8 kg (295 lb)   SpO2 94%   BMI 47.61 kg/m      GENERAL APPEARANCE: alert and no distress  EYES:  no scleral icterus, pupils equal  PULM: lungs clear to auscultation, equal air movement, no cyanosis or clubbing  CV: regular rhythm, normal rate, no rub     -JVP -     -edema +  GI: soft, non tender,   NEURO: mentation intact and speech normal, no asterixis       Labs:   All labs reviewed by me  Electrolytes/Renal -   Recent Labs   Lab Test 02/01/23  0734 01/31/23  1133 01/31/23  0846 01/31/23  0757 01/30/23  2043 01/30/23  1217 10/04/19  1410 10/04/19  0704 10/03/19  2049 10/03/19  0604 10/02/19  0545     --   --  141  --  138   < > 137   < > 139 140   POTASSIUM 3.4  --   --  3.4  --  3.6   < > 4.2   < > 3.8 3.8   CHLORIDE 114*  --   --  111*  --  112*   < > 105   < > 105 106   CO2 20*  --   --  17*  --  14*   < > 26   < > 25 26   BUN 43.9*  --   --  44.3*  --  47.6*   < > 29   < > 22 18   CR 2.41*  --   --  2.52*  --  2.65*   < > 0.93   < > 0.79 0.76   GLC 91 84 85 86  --  82   < > 97   < > 96 94   MARIA INES 8.0*  --   --  8.1*  --  8.2*   < > 9.2   < > 9.1 9.0   MAG 2.0  --   --  2.2 2.4* 1.5*   < > 2.0   < > 2.1 2.2   PHOS  --   --   --   --   --   --   --  4.2  --  4.3 4.4    < > = values in this interval not displayed.       CBC -   Recent Labs   Lab Test 02/02/23  0800 02/01/23  0734 01/31/23  1009   WBC 0.4* 0.3* 0.2*   HGB 7.2* 7.2* 6.9*   PLT 39* 25* 24*       LFTs -   Recent Labs   Lab Test 01/28/23  2221 12/06/22  1049 " 10/24/22  1138 05/24/22  0959   ALKPHOS 57 65  --  70   BILITOTAL 0.7 0.3  --  0.4   ALT 13 12  --  15   AST 28 12  --  14   PROTTOTAL 6.1* 7.5 8.0 8.2   ALBUMIN 2.5* 3.6  --  3.5       Iron Panel -   Recent Labs   Lab Test 01/29/23  0626 12/18/19  1129 09/24/16  1304   IRON 47 46 71   IRONSAT 40 17  --    RAMÍREZ 1,499* 125 83           Current Medications:    ARIPiprazole  5 mg Oral Daily     atorvastatin  40 mg Oral QPM     ceFEPIme  2 g Intravenous Q12H     cyanocobalamin  1,000 mcg Intramuscular Daily     doxycycline hyclate  100 mg Oral Q12H [facility] (08/20)     DULoxetine  120 mg Oral Daily     filgrastim  480 mcg Subcutaneous Daily at 8 pm     fluconazole  100 mg Oral Daily     folic acid  2 mg Oral Daily     gabapentin  100 mg Oral TID     leucovorin calcium  100 mg/m2 Intravenous Q6H     miconazole   Topical BID     nystatin  500,000 Units Swish & Spit 4x Daily     pantoprazole  40 mg Intravenous Q12H     sodium chloride (PF)  3 mL Intracatheter Q8H     sodium chloride (PF)  3 mL Intracatheter Q8H     sodium chloride (PF)  3 mL Intracatheter Q8H     warfarin-No DOSE today  1 each Does not apply no dose today (warfarin)       - MEDICATION INSTRUCTIONS -       - MEDICATION INSTRUCTIONS -       IV infusion builder WITH additives 75 mL/hr at 02/02/23 0414     Warfarin Therapy Reminder       Sarahi Kelley MD

## 2023-02-02 NOTE — PROGRESS NOTES
[facility]    Medicine Progress Note - Hospitalist Service    Date of Admission:  1/28/2023    Assessment & Plan     Shira Rodriguez is a 62-year-old female with rheumatoid arthritis on methotrexate, prior massive PE on warfarin, morbid obesity who presented on 1/28/2023 with severe sepsis.  She was found unresponsive in her home. She  Was hypotensive and  had temp of 100.2 in route to hospital.      Severe sepsis-present on admission  candida intertrigo  Neutropenic fever  Lactic acidosis -Resolved   -  Presented With lactic acidosis of 3.2, hypotension, HR of 105, WBC of 0.4, acute metabolic encephalopathy - met criteria for severe sepsis on admission  - CT C/A/P showed small chronic left pleural effusion, small fluid around distal esophagus and duodenum  - she was  Noted to have panniculitis under abdominal pannus, significant redness under her breasts.  Source could be panniculitis, mucositis due to severe neutropenia, duodenitis.  - UA does not suggest UTI, no pneumonia, COVID-19, influenza and RSV negative and   Has a cough which is chronic. Has had severe throat pain for 2 days.  Has diverticulosis but no diverticulitis  - Blood cultures and urine cultures negative so far  - Blood pressure improved with IV fluids and is currently in normal range.    - Lactic acidosis has resolved  -She was initially started on broad-spectrum antibiotics including cefepime, vancomycin and infectious disease and oncology were consulted.  -Her antibiotics were switched to cefepime, doxycycline and fluconazole( to cover for Candida intertrigo)  -She has infectious work-up sent including tick panel which has been pending, and her serum Fungitell assay( 1,3 beta d glucan) is positive and tested negative for hepatitis B and C and negative for HIV, she also detected positive for EBV IgG, CMV is negative and Blastomyces and histoplasmosis is pending along with Rickettsia  -He also had immunological work-up  and C3 and C4 are negative, ANCA is negative and double-stranded DNA is pending, methotrexate levels are undetectable  -Of note she had an TTE done which was concerning for dissection versus vegetation and she underwent LETA on 1/31 which does not show any evidence of dissection and no evidence of any vegetation  -ID and oncology/hematology on board and will continue patient with current antibiotics  -I did reviewed the note from infectious disease and we will DC doxycycline after 5-day course today, continue with cefepime due to ongoing neutropenia and unclear significance ofBeta D glucan at this time, no obvious systemic fungal infection and  Continue with Fluconazole for candida intertrigo  -We will continue to monitor       Acute metabolic encephalopathy-secondary to severe sepsis-resolved  -Head CT-did not show any acute changes  -On exam she is at baseline     Pancytopenia with severe leukopenia and neutropenia, moderate anemia and thrombocytopenia  Severe vitamin B12 deficiency and folate deficiency  - On 12/7/2022, CBC showed hemoglobin 13.4, WBC 10.6, platelets 271  - On admission, 1/28 - CBC showed Hb 9.4, WBC 0.4, ANC 0.1, platelets 59  - Iron studies showed normal iron saturation, low iron binding capacity indicating anemia of chronic disease.  Reticulocyte count suppressed  - B12 low at < 150 and folate low at 2.8  -elevated D-dimer of 7.38, fibrinogen is 932 and PTT is 42 with INR of 1.99  - Peripheral smear was done which shows - Moderate to marked pancytopenia.Negative for schistocytes. Negative for circulating blasts on scanning.  -I also reviewed the results of flow cytometry and it shows polytypic B cells, no aberrant immunophenotype on T cells and rare to absent CD34 positive blasts  -She does have elevated Kappa and lambda free light chains, monoclonal IgM immunoglobulin of lambda light chain type, IgM is elevated at 395 and IgG and IgA are within normal limits and also reviewed the report of  serum electrophoresis  -For oncology of patient's elevatedfibrinogen level and a significantly elevated D-dimer argues against DIC  -Serum methotrexate levels are normal  -Her platelet count has been on the lower side and she was given 1 unit ED on 1/31 and 2/1 and her platelet count today is 39 and will give her another unit of platelet   -She is also receiving Neupogen with goal of ANC more than 1500  -Status one unit of blood on 1/31/2023 and hemoglobin today is 7.2  -she was started on B12 and folate replacement and given no improvement was started on folinic acid - started off with 1000 mg/m  x 2 doses (done for high dose methotrexate toxicity) and  Will drop to 100 mg/m  now.   -We will continue to monitor her hemoglobin closely and if her hemoglobin is less than 7 we will give one unit of blood  -Given that she has not shown significant improvement after replacing B12 and folate she will need to have bone marrow biopsy done and scheduled for am and patient agrees.     Acute kidney injury on top of CKD 4- Improving  Mild anion gap metabolic acidosis   - secondary to lactic acidosis and acute kidney injury, pH 7.25 on admission  - Baseline creatinine 2-2.2.  Creatinine on admission 3.25.    -Nephrology has been consulted since admission and she has been started on bicarb drip and her creatinine is being monitored and has improved to 2.41 and her bicarb is improved to 20 and continue with bicarb drip and nephrology is on board and appreciate input  -Labs from today are pending we will continue to monitor and appreciate input     Probable esophagitis  Probable duodenitis  -CT on admission showed small amount of fluid adjacent to distal thoracic esophagus and scattered posterior mediastinal lymph nodes along esophagus of uncertain etiology but could be secondary to esophagitis or other inflammatory process.  CT scan also showed small edema around second and third portion of duodenum.  -Lipase is normal  -On IV  "Protonix, continue and will need to have EGD when she is more stable      Hypomagnesemia-resolved  -She had magnesium of 1.4 on admission and was started on magnesium replacement and magnesium is normal      Mild hypovolemic hyponatremia-resolved  -Sodium 132 on admission and was given IV fluids and sodium normalized     Type II MI due to severe sepsis  -High-sensitivity troponin mildly elevated at 48 and repeat was normal and can be due to demand     Hyperuricemia, uric acid 8.9     History of massive PE causing PEA arrest, 9/2019  -Patient was started on warfarin and her INR was 2.80 on 1/30 and her Coumadin dose was held and INR today is 2.52 and given her low platelets and therapeutic range   -we will continue to hold Coumadin for today also and INR is therapeutic at 2.67      Rheumatoid arthritis  -Had been on methotrexate for some time, resumed about 2 weeks ago due to worsening back and shoulder pain  -Hold methotrexate      3 cm right adrenal nodule  -Noted on CT.  Was present on previous imaging as well.  Likely benign  -A.m. cortisol 1/29, normal at 43.9  -Will need to keep a close eye and follow as outpatient with her primary care physician     COPD  -No acute exacerbation     Chronic major depression  Chronic pain  - see recent pain clinic visit summary from 1/17/2023 for background information  - Continue Abilify, Cymbalta and Neurontin     Morbid obesity, BMI 46     Hypoalbuminemia, albumin 2.5     Panniculitis  Candidal rash-under pannus, breasts  -Wound care on board and continue with miconazole powder     Essential hypertension  -PTA-lisinopril  -Hold lisinopril due to acute kidney injury and hypotension on admission  -We will continue to monitor her blood pressure closely and they have been stable     Severe Obesity: Estimated body mass index is 45.9 kg/m  as calculated from the following:    Height as of this encounter: 1.676 m (5' 6\").    Weight as of this encounter: 129 kg (284 lb 6.3 " "oz).    Dysphagia   Mucositis   - she has been complaining of throat pain and on exam of her mouth there are area of ulcer and we will consult speech  - we will also use magic mouth wash and nystatin  - speech consulted and reviewed notes and appreciate input and will need to have video swallow study with stable  - may need EGD a above but will monitor for now and patient wants to hold consulting GI for now       Diet: Combination Diet Minced and Moist Diet (level 5); Mildly Thick (level 2) (No straw); 2 gm NA Diet    DVT Prophylaxis: Warfarin  [place] Catheter: Not present  Lines: None     Cardiac Monitoring: ACTIVE order. Indication: Severe sepsis  Code Status: Full Code      Clinically Significant Risk Factors              # Hypoalbuminemia: Lowest albumin = 2.5 g/dL at 1/28/2023 10:21 PM, will monitor as appropriate   # Thrombocytopenia: Lowest platelets = 24 in last 2 days, will monitor for bleeding         # Severe Obesity: Estimated body mass index is 47.61 kg/m  as calculated from the following:    Height as of this encounter: 1.676 m (5' 6\").    Weight as of this encounter: 133.8 kg (295 lb).          Disposition Plan     Expected Discharge Date: 02/06/2023                  Prem Levy MD  Hospitalist Service  [facility]  Securely message with [employer] (more info)  Text page via [employer] Paging/Directory   ______________________________________________________________________    Interval History     I did see the patient today and her pain is still there    She is able to tolerate the diet, continues to have chronic cough.  I did discuss the labs for the patient and the plan for bone marrow biopsy and she agrees      Physical Exam   Vital Signs: Temp: 99  F (37.2  C) Temp src: Axillary BP: 105/66 Pulse: 97   Resp: 18 SpO2: 94 % O2 Device: None (Room air)    Weight: 295 lbs 0 oz        General: Patient appears comfortable and in no acute distress.  Oral : area of " ulcer  Respiratory:CTLA  Cardiovascular: Regular rate , S1 and S2 normal with no murmer or rubs or gallops  Abdomen:   soft , non tender , non distended and bowel sound present   Skin: She has significant panniculitis/candidia around the groin, breast folds bilaterally  Neurologic:  No facial droop  Musculoskeletal: Normal Range of motion over upper and lower extremities bilaterally   Psychiatric: cooperative     Medical Decision Making             Time spent in care of patient is 51minutes and I reviewed the patient basic metabolic panel, CBC, discussed with dr meza , further treatment plan     Data     I have personally reviewed the following data over the past 24 hrs:    0.4 (LL)  \   7.2 (L)   / 39 (LL)     N/A N/A N/A /  N/A   N/A N/A N/A \       INR:  2.67 (H) PTT:  N/A   D-dimer:  N/A Fibrinogen:  N/A       Imaging results reviewed over the past 24 hrs:   No results found for this or any previous visit (from the past 24 hour(s)).  I also reviewed the report of peripheral smear, flow cytometry, hematological work-up, Bd glucan hepatitis and HIV panel and serum complement and

## 2023-02-02 NOTE — PROGRESS NOTES
[facility] Nurse Inpatient Assessment     Consulted for: Abdomen, bilateral breast, umbilicus, groin, right popliteal, gluteal folds    Patient History (according to provider note(s):      62-year-old female with rheumatoid arthritis on methotrexate, prior massive PE on warfarin, morbid obesity who presented on 1/28/2023 with severe sepsis.  She was found unresponsive in her home.  Was hypotensive, had temp of 100.2 in route to hospital.      Severe sepsis-present on admission  Neutropenic fever  Panniculitis   Candidal rash-under pannus, breasts    Areas Assessed:      Areas visualized during today's visit: Skin folds     Skin Injury Location: Bilateral breast, bilateral groin, pannus, umbilicus, right popliteal, gluteal fold    2-2-23 right breast      1/30/23 Right breast      2-2-23 left breast      1-30-23 Left Breast        2-2-23 1-30-23 Right abdomen      1-30-23 Left abdomen      1-30-23 Umbilicus      2-2-23 right knee crease (medial popliteal)      1-30-23 Right medial popliteal      2-2-23 gluteal fold        Skin injury due to: Fungal rash  and Intertrigo  Skin history and plan of care:  Patient on disability, patient has no assistance at home. Poor hygiene. Arrives with rashes and denudement within multiple skin folds       Skin assessment: Denudement, Erosion of epidermis, Lesions-satellite and Rash     Measurements (length x width x depth, in cm) Right breast  approx 10 cm x 30 cm x 0.1cm, Left breast 0.7cm x 10cm x 0.1cm Umblicus 3.2cm x 0.5cm x 0.1cm( but into umbilical crease). Pannus/Groin/Mons Pubis wound extends full width of abdomen approximately 75 cm, length runs from abdomen through mons pubis and bilateral groin approximately 25 cm with a depth of 0.1cm. Right popliteal  Approximately 5cm x 10cm x 0.1cm      Color: pink     Temperature  normal      Drainage: small       Color: serous and serosanguinous      Odor: mild  Pain: moderate to severe,  burning  Pain interventions prior to dressing change: slow and gentle cares and pain meds given after when available  Treatment goal: Heal , Infection control/prevention and Decrease moisture  STATUS: very slight improvement in most areas  Supplies ordered: supplies stored on unit, discussed with RN and discussed with patient       Treatment Plan:     Bilateral breast, Umbilicus, Pannus, Bilateral Groin, Right popliteal, gluteal cleft wound(s): BID (consider premedicating).  Note: need 2 people for adequate access and visualization of folds.   1. Spray areas with generous amount Bandar perineal lotion, then dab clean with soft dry cloths.  Ensure cleaning to base of folds.   2. Apply miconazole powder to all red, raw, rashy areas.  PLEASE use a dry cloth to spread it thinly and evenly.  Ensure powder gets to base of folds.    3. Apply InterDry into skin folds in a flat layer, ensuring approx 2 inches sticking out of fold to allow for evaporation of moisture.  USE THE FULL WIDTH of the InterDry in the deep pannus folds, so that it does not disappear into fold when pt sits up.  Since we are using with powder replace at least daily with the morning change.  4. Keep HOB as low as tolerated to decrease pressure/friction of skin folds rubbing together.      If minimal improvement over next few days will consider adding Vashe soaks to affected areas.      Orders: Reviewed and Updated    RECOMMEND PRIMARY TEAM ORDER: None, at this time    Education provided: plan of care, wound progress, Moisture management and Hygiene  Discussed plan of care with: Patient and Nurse  WOC nurse follow-up plan: twice weekly for now and prn  Notify WOC if wound(s) deteriorate.  Nursing to notify the Provider(s) and re-consult the WOC Nurse if new skin concern.    DATA:     Current support surface: Standard  Standard gel/foam mattress (IsoFlex, Atmos air, etc)  Containment of urine/stool: Incontinent pad in bed and Purewick external catheter    BMI: Body mass index is 47.61 kg/m .   Active diet order: Orders Placed This Encounter      Combination Diet Minced and Moist Diet (level 5); Mildly Thick (level 2) (No straw); 2 gm NA Diet     Output: I/O last 3 completed shifts:  In: 5137.92 [I.V.:4897.92]  Out: 1550 [Urine:1550]     Labs:   Recent Labs   Lab 02/02/23  0800 01/29/23  0626 01/28/23  2221   ALBUMIN  --   --  2.5*   HGB 7.2*   < > 9.4*   INR 2.67*   < > 1.99*   WBC 0.4*   < > 0.4*    < > = values in this interval not displayed.     Pressure injury risk assessment:   Sensory Perception: 3-->slightly limited  Moisture: 1-->constantly moist  Activity: 1-->bedfast  Mobility: 2-->very limited  Nutrition: 2-->probably inadequate  Friction and Shear: 1-->problem  Ole Score: 10    Salina Olmstead RN CWOCN   Dept. Vocera- Contact [facility] Nurse ([place]) via  Vocera   Dept. Office Number: [phone]

## 2023-02-02 NOTE — PROGRESS NOTES
02/02/23 1708   Appointment Info   Signing Clinician's Name / Credentials (PT) Tsaha Sosa DPT   Living Environment   People in Home alone   Current Living Arrangements apartment   Home Accessibility no concerns   Transportation Anticipated family or friend will provide   Living Environment Comments Pt resides alone in apartment, uses elevator. Pt occasionally uses a 4WW (no seat/no brakes - pt reports she put second set of wheels on it). BR SET UP: tub/shower combo with shower chair, grab bars; standard toilet with RTS.   Self-Care   Usual Activity Tolerance moderate   Current Activity Tolerance poor   Equipment Currently Used at Home walker, rolling;cane, straight;raised toilet seat;shower chair   Fall history within last six months yes   Number of times patient has fallen within last six months 1   Activity/Exercise/Self-Care Comment Pt ind all ADLs - pt reports some difficulty with getting in/out of tub/shower combo.   General Information   Onset of Illness/Injury or Date of Surgery 01/28/23   Referring Physician Prem Levy MD   Patient/Family Therapy Goals Statement (PT) Return home   Pertinent History of Current Problem (include personal factors and/or comorbidities that impact the POC) Per chart: Shira Rodriguez is a 62-year-old female with rheumatoid arthritis on methotrexate, prior massive PE on warfarin, morbid obesity who presented on 1/28/2023 with severe sepsis.  She was found unresponsive in her home. She  Was hypotensive and  had temp of 100.2 in route to hospital   Existing Precautions/Restrictions fall   General Observations Pt supine in bed upon therapist arrival, agreeable to PT   Cognition   Affect/Mental Status (Cognition) WFL   Orientation Status (Cognition) oriented x 4   Follows Commands (Cognition) WFL   Pain Assessment   Patient Currently in Pain   (Pt reports pain in various wounds)   Integumentary/Edema   Integumentary/Edema Comments Wounds underneath breast, panus, R knee, and  sacrum   Range of Motion (ROM)   Range of Motion ROM deficits secondary to pain;ROM deficits secondary to weakness   Strength (Manual Muscle Testing)   Strength (Manual Muscle Testing) Deficits observed during functional mobility   Bed Mobility   Comment, (Bed Mobility) Rolling w/ Min A   Transfers   Comment, (Transfers) Not tested d/t weakness and pain   Gait/Stairs (Locomotion)   Comment, (Gait/Stairs) Not tested d/t weakness and pain   Sensory Examination   Sensory Perception patient reports no sensory changes   Clinical Impression   Criteria for Skilled Therapeutic Intervention Yes, treatment indicated   PT Diagnosis (PT) Impaired functional mobility and gait   Influenced by the following impairments Pain, weakness, decreased activity tolerance   Functional limitations due to impairments Limited functional mobility requiring AD and assist   Clinical Presentation (PT Evaluation Complexity) Stable/Uncomplicated   Clinical Presentation Rationale Based on PMH, current status, and social support   Clinical Decision Making (Complexity) low complexity   Planned Therapy Interventions (PT) balance training;bed mobility training;gait training;strengthening;transfer training;progressive activity/exercise   Risk & Benefits of therapy have been explained evaluation/treatment results reviewed;care plan/treatment goals reviewed;risks/benefits reviewed;current/potential barriers reviewed;participants voiced agreement with care plan;participants included;patient   PT Total Evaluation Time   PT Eval, Low Complexity Minutes (29154) 10   Physical Therapy Goals   PT Frequency 5x/week   PT Predicted Duration/Target Date for Goal Attainment 02/09/23   PT Goals Bed Mobility;Transfers;Gait   PT: Bed Mobility Supervision/stand-by assist;Supine to/from sit   PT: Transfers Supervision/stand-by assist;Assistive device;Sit to/from stand   PT: Gait Supervision/stand-by assist;Assistive device;50 feet   Interventions   Interventions Quick Adds  Therapeutic Procedure;Therapeutic Activity   Therapeutic Procedure/Exercise   Ther. Procedure: strength, endurance, ROM, flexibillity Minutes (91216) 14   Symptoms Noted During/After Treatment fatigue;increased pain   Treatment Detail/Skilled Intervention LE strengthening exercises for increased functional mobility. Therapist guided pt through supine APs, quad sets, glute sets, heel slides, and hip abd slides. Pt tolerated well.   Therapeutic Activity   Therapeutic Activities: dynamic activities to improve functional performance Minutes (13861) 5   Symptoms Noted During/After Treatment Fatigue;Increased pain   Treatment Detail/Skilled Intervention Pt rolled side to side w/ Min A, pt able to hold sidelying position for 15 seconds w/ bed rail and SBA. Pt required lots of encouragement during session. Pt supine in bed at end of session, all needs w/in reach, RN updated.   PT Discharge Planning   PT Plan LE exercises, progress bed mob, seated balance   PT Discharge Recommendation (DC Rec) Transitional Care Facility   PT Rationale for DC Rec Pt significantly below baseline, currently Min A for rolling, unable to perform OOB mobility d/t pain and weakness. Recommend TCU to increase strength and functional mobility.   PT Brief overview of current status Rolling w/ Min A, lift for transfers   Total Session Time   Timed Code Treatment Minutes 19   Total Session Time (sum of timed and untimed services) 29

## 2023-02-02 NOTE — PROVIDER NOTIFICATION
MD Notification    Notified Person: MD    Notified Person Name: Dr. Hills    Notification Date/Time: 02/02/23 8:27 AM    Notification Interaction: [employer]    Purpose of Notification: 825 S.S. Would you like the bone marrow biopsy scheduled for today? I didn't see an actual order for this, just the labs ordered. I spoke with patho, they will check on scheduling. Please advise. Nancy CORRAL [phone]    Orders Received: Yes, need to schedule bone marrow biopsy for as soon as she can get in.    Comments: 825 S.S. FYI Biopsy scheduled for 1200 2/3

## 2023-02-02 NOTE — PROVIDER NOTIFICATION
Prescriber Notification Note    The pharmacist has communicated with this patient's provider regarding a concern or therapy recommendation.    Notified Person: Dr. Hills  Date/Time of Notification: 2/2/2023 at  00:12  Interaction: phone  Concern/Recommendation: Leucovorin 1000 mg/m2 is higher dose than customary.  Dose confirmed as intentional.     Comments/Additional Details:

## 2023-02-03 ENCOUNTER — ANESTHESIA (OUTPATIENT)
Dept: GASTROENTEROLOGY | Facility: CLINIC | Age: 63
DRG: 871 | End: 2023-02-03
Payer: COMMERCIAL

## 2023-02-03 ENCOUNTER — APPOINTMENT (OUTPATIENT)
Dept: GENERAL RADIOLOGY | Facility: CLINIC | Age: 63
DRG: 871 | End: 2023-02-03
Attending: SPECIALIST
Payer: COMMERCIAL

## 2023-02-03 LAB
A PHAGOCYTOPH DNA BLD QL NAA+PROBE: NOT DETECTED
ABO/RH(D): NORMAL
ACANTHOCYTES BLD QL SMEAR: SLIGHT
ANION GAP SERPL CALCULATED.3IONS-SCNC: 9 MMOL/L (ref 7–15)
ANTIBODY SCREEN: NEGATIVE
BACTERIA BLD CULT: NO GROWTH
BACTERIA BLD CULT: NO GROWTH
BLD PROD TYP BPU: NORMAL
BLOOD COMPONENT TYPE: NORMAL
BUN SERPL-MCNC: 42.4 MG/DL (ref 8–23)
CALCIUM SERPL-MCNC: 7.9 MG/DL (ref 8.8–10.2)
CHLORIDE SERPL-SCNC: 110 MMOL/L (ref 98–107)
CODING SYSTEM: NORMAL
CREAT SERPL-MCNC: 1.83 MG/DL (ref 0.51–0.95)
DEPRECATED HCO3 PLAS-SCNC: 25 MMOL/L (ref 22–29)
E CHAFFEENSIS DNA BLD QL NAA+PROBE: NOT DETECTED
E EWINGII DNA SPEC QL NAA+PROBE: NOT DETECTED
EHRLICHIA DNA SPEC QL NAA+PROBE: NOT DETECTED
ELLIPTOCYTES BLD QL SMEAR: SLIGHT
ERYTHROCYTE [DISTWIDTH] IN BLOOD BY AUTOMATED COUNT: 14.3 % (ref 10–15)
GFR SERPL CREATININE-BSD FRML MDRD: 31 ML/MIN/1.73M2
GLUCOSE SERPL-MCNC: 96 MG/DL (ref 70–99)
HCT VFR BLD AUTO: 22.4 % (ref 35–47)
HGB BLD-MCNC: 7.1 G/DL (ref 11.7–15.7)
INR PPP: 2.58 (ref 0.85–1.15)
ISSUE DATE AND TIME: NORMAL
MCH RBC QN AUTO: 28.2 PG (ref 26.5–33)
MCHC RBC AUTO-ENTMCNC: 31.7 G/DL (ref 31.5–36.5)
MCV RBC AUTO: 89 FL (ref 78–100)
PLAT MORPH BLD: ABNORMAL
PLATELET # BLD AUTO: 21 10E3/UL (ref 150–450)
POTASSIUM SERPL-SCNC: 3 MMOL/L (ref 3.4–5.3)
POTASSIUM SERPL-SCNC: 3.3 MMOL/L (ref 3.4–5.3)
R RICKETTSI IGG TITR SER IF: NORMAL {TITER}
R RICKETTSI IGM TITR SER IF: NORMAL {TITER}
RBC # BLD AUTO: 2.52 10E6/UL (ref 3.8–5.2)
RBC MORPH BLD: ABNORMAL
RETICS # AUTO: 0 10E6/UL (ref 0.03–0.1)
RETICS/RBC NFR AUTO: 0.1 % (ref 0.5–2)
SODIUM SERPL-SCNC: 144 MMOL/L (ref 136–145)
SPECIMEN EXPIRATION DATE: NORMAL
UNIT ABO/RH: NORMAL
UNIT NUMBER: NORMAL
UNIT STATUS: NORMAL
UNIT TYPE ISBT: 6200
WBC # BLD AUTO: 0.3 10E3/UL (ref 4–11)

## 2023-02-03 PROCEDURE — 370N000017 HC ANESTHESIA TECHNICAL FEE, PER MIN: Performed by: PATHOLOGY

## 2023-02-03 PROCEDURE — 250N000009 HC RX 250: Performed by: NURSE ANESTHETIST, CERTIFIED REGISTERED

## 2023-02-03 PROCEDURE — 38221 DX BONE MARROW BIOPSIES: CPT | Performed by: PATHOLOGY

## 2023-02-03 PROCEDURE — 82310 ASSAY OF CALCIUM: CPT | Performed by: HOSPITALIST

## 2023-02-03 PROCEDURE — 88237 TISSUE CULTURE BONE MARROW: CPT | Performed by: INTERNAL MEDICINE

## 2023-02-03 PROCEDURE — 86850 RBC ANTIBODY SCREEN: CPT | Performed by: HOSPITALIST

## 2023-02-03 PROCEDURE — 88185 FLOWCYTOMETRY/TC ADD-ON: CPT | Performed by: INTERNAL MEDICINE

## 2023-02-03 PROCEDURE — 85027 COMPLETE CBC AUTOMATED: CPT | Performed by: HOSPITALIST

## 2023-02-03 PROCEDURE — 250N000013 HC RX MED GY IP 250 OP 250 PS 637: Performed by: INTERNAL MEDICINE

## 2023-02-03 PROCEDURE — 250N000011 HC RX IP 250 OP 636: Performed by: NURSE ANESTHETIST, CERTIFIED REGISTERED

## 2023-02-03 PROCEDURE — P9035 PLATELET PHERES LEUKOREDUCED: HCPCS

## 2023-02-03 PROCEDURE — 250N000011 HC RX IP 250 OP 636: Performed by: HOSPITALIST

## 2023-02-03 PROCEDURE — 83516 IMMUNOASSAY NONANTIBODY: CPT | Performed by: INTERNAL MEDICINE

## 2023-02-03 PROCEDURE — 36415 COLL VENOUS BLD VENIPUNCTURE: CPT | Performed by: INTERNAL MEDICINE

## 2023-02-03 PROCEDURE — 86901 BLOOD TYPING SEROLOGIC RH(D): CPT | Performed by: HOSPITALIST

## 2023-02-03 PROCEDURE — 99233 SBSQ HOSP IP/OBS HIGH 50: CPT | Performed by: INTERNAL MEDICINE

## 2023-02-03 PROCEDURE — 86258 DGP ANTIBODY EACH IG CLASS: CPT | Performed by: INTERNAL MEDICINE

## 2023-02-03 PROCEDURE — 250N000009 HC RX 250: Performed by: PATHOLOGY

## 2023-02-03 PROCEDURE — 85045 AUTOMATED RETICULOCYTE COUNT: CPT | Performed by: HOSPITALIST

## 2023-02-03 PROCEDURE — 88305 TISSUE EXAM BY PATHOLOGIST: CPT | Mod: TC | Performed by: PATHOLOGY

## 2023-02-03 PROCEDURE — 71045 X-RAY EXAM CHEST 1 VIEW: CPT

## 2023-02-03 PROCEDURE — 99233 SBSQ HOSP IP/OBS HIGH 50: CPT | Performed by: HOSPITALIST

## 2023-02-03 PROCEDURE — 85610 PROTHROMBIN TIME: CPT | Performed by: HOSPITALIST

## 2023-02-03 PROCEDURE — 250N000013 HC RX MED GY IP 250 OP 250 PS 637: Performed by: HOSPITALIST

## 2023-02-03 PROCEDURE — 999N000010 HC STATISTIC ANES STAT CODE-CRNA PER MINUTE: Performed by: PATHOLOGY

## 2023-02-03 PROCEDURE — 86923 COMPATIBILITY TEST ELECTRIC: CPT | Performed by: HOSPITALIST

## 2023-02-03 PROCEDURE — 86340 INTRINSIC FACTOR ANTIBODY: CPT | Performed by: INTERNAL MEDICINE

## 2023-02-03 PROCEDURE — 88184 FLOWCYTOMETRY/ TC 1 MARKER: CPT | Performed by: INTERNAL MEDICINE

## 2023-02-03 PROCEDURE — 250N000013 HC RX MED GY IP 250 OP 250 PS 637: Performed by: SPECIALIST

## 2023-02-03 PROCEDURE — C9113 INJ PANTOPRAZOLE SODIUM, VIA: HCPCS | Performed by: HOSPITALIST

## 2023-02-03 PROCEDURE — 250N000013 HC RX MED GY IP 250 OP 250 PS 637: Performed by: NURSE PRACTITIONER

## 2023-02-03 PROCEDURE — 07DR0ZX EXTRACTION OF ILIAC BONE MARROW, OPEN APPROACH, DIAGNOSTIC: ICD-10-PCS | Performed by: PATHOLOGY

## 2023-02-03 PROCEDURE — P9037 PLATE PHERES LEUKOREDU IRRAD: HCPCS | Performed by: HOSPITALIST

## 2023-02-03 PROCEDURE — 99232 SBSQ HOSP IP/OBS MODERATE 35: CPT | Performed by: INTERNAL MEDICINE

## 2023-02-03 PROCEDURE — 258N000003 HC RX IP 258 OP 636: Performed by: NURSE ANESTHETIST, CERTIFIED REGISTERED

## 2023-02-03 PROCEDURE — 84132 ASSAY OF SERUM POTASSIUM: CPT | Performed by: HOSPITALIST

## 2023-02-03 PROCEDURE — 99233 SBSQ HOSP IP/OBS HIGH 50: CPT | Performed by: SPECIALIST

## 2023-02-03 PROCEDURE — 38222 DX BONE MARROW BX & ASPIR: CPT | Performed by: PATHOLOGY

## 2023-02-03 PROCEDURE — 88291 CYTO/MOLECULAR REPORT: CPT | Performed by: MEDICAL GENETICS

## 2023-02-03 PROCEDURE — 250N000011 HC RX IP 250 OP 636: Performed by: INTERNAL MEDICINE

## 2023-02-03 PROCEDURE — 120N000001 HC R&B MED SURG/OB

## 2023-02-03 PROCEDURE — 86364 TISS TRNSGLTMNASE EA IG CLAS: CPT | Performed by: INTERNAL MEDICINE

## 2023-02-03 PROCEDURE — 36415 COLL VENOUS BLD VENIPUNCTURE: CPT | Performed by: HOSPITALIST

## 2023-02-03 PROCEDURE — 88313 SPECIAL STAINS GROUP 2: CPT | Mod: TC | Performed by: PATHOLOGY

## 2023-02-03 PROCEDURE — 88189 FLOWCYTOMETRY/READ 16 & >: CPT | Mod: GC | Performed by: PATHOLOGY

## 2023-02-03 RX ORDER — PROPOFOL 10 MG/ML
INJECTION, EMULSION INTRAVENOUS CONTINUOUS PRN
Status: DISCONTINUED | OUTPATIENT
Start: 2023-02-03 | End: 2023-02-03

## 2023-02-03 RX ORDER — POTASSIUM CHLORIDE 1500 MG/1
40 TABLET, EXTENDED RELEASE ORAL ONCE
Status: DISCONTINUED | OUTPATIENT
Start: 2023-02-03 | End: 2023-02-03

## 2023-02-03 RX ORDER — SODIUM CHLORIDE, SODIUM LACTATE, POTASSIUM CHLORIDE, CALCIUM CHLORIDE 600; 310; 30; 20 MG/100ML; MG/100ML; MG/100ML; MG/100ML
INJECTION, SOLUTION INTRAVENOUS CONTINUOUS PRN
Status: DISCONTINUED | OUTPATIENT
Start: 2023-02-03 | End: 2023-02-03

## 2023-02-03 RX ORDER — LIDOCAINE HYDROCHLORIDE AND EPINEPHRINE 10; 10 MG/ML; UG/ML
INJECTION, SOLUTION INFILTRATION; PERINEURAL PRN
Status: DISCONTINUED | OUTPATIENT
Start: 2023-02-03 | End: 2023-02-03 | Stop reason: HOSPADM

## 2023-02-03 RX ORDER — POTASSIUM CHLORIDE 1.5 G/1.58G
20 POWDER, FOR SOLUTION ORAL 2 TIMES DAILY
Status: COMPLETED | OUTPATIENT
Start: 2023-02-03 | End: 2023-02-03

## 2023-02-03 RX ORDER — ONDANSETRON 2 MG/ML
INJECTION INTRAMUSCULAR; INTRAVENOUS PRN
Status: DISCONTINUED | OUTPATIENT
Start: 2023-02-03 | End: 2023-02-03

## 2023-02-03 RX ORDER — LIDOCAINE HYDROCHLORIDE 20 MG/ML
INJECTION, SOLUTION INFILTRATION; PERINEURAL PRN
Status: DISCONTINUED | OUTPATIENT
Start: 2023-02-03 | End: 2023-02-03

## 2023-02-03 RX ORDER — FLUCONAZOLE 100 MG/1
200 TABLET ORAL DAILY
Status: DISCONTINUED | OUTPATIENT
Start: 2023-02-04 | End: 2023-02-06

## 2023-02-03 RX ORDER — PROPOFOL 10 MG/ML
INJECTION, EMULSION INTRAVENOUS PRN
Status: DISCONTINUED | OUTPATIENT
Start: 2023-02-03 | End: 2023-02-03

## 2023-02-03 RX ORDER — POTASSIUM CHLORIDE 1500 MG/1
20 TABLET, EXTENDED RELEASE ORAL ONCE
Status: DISCONTINUED | OUTPATIENT
Start: 2023-02-03 | End: 2023-02-03

## 2023-02-03 RX ADMIN — POTASSIUM CHLORIDE 20 MEQ: 1.5 POWDER, FOR SOLUTION ORAL at 14:49

## 2023-02-03 RX ADMIN — CEFEPIME HYDROCHLORIDE 2 G: 2 INJECTION, POWDER, FOR SOLUTION INTRAVENOUS at 14:51

## 2023-02-03 RX ADMIN — PROPOFOL 30 MG: 10 INJECTION, EMULSION INTRAVENOUS at 12:47

## 2023-02-03 RX ADMIN — PROPOFOL 80 MCG/KG/MIN: 10 INJECTION, EMULSION INTRAVENOUS at 12:52

## 2023-02-03 RX ADMIN — NYSTATIN 500000 UNITS: 100000 SUSPENSION ORAL at 22:09

## 2023-02-03 RX ADMIN — LIDOCAINE HYDROCHLORIDE 20 MG: 20 INJECTION, SOLUTION INFILTRATION; PERINEURAL at 12:46

## 2023-02-03 RX ADMIN — CEFEPIME HYDROCHLORIDE 2 G: 2 INJECTION, POWDER, FOR SOLUTION INTRAVENOUS at 23:58

## 2023-02-03 RX ADMIN — GABAPENTIN 100 MG: 100 CAPSULE ORAL at 09:47

## 2023-02-03 RX ADMIN — SODIUM CHLORIDE, POTASSIUM CHLORIDE, SODIUM LACTATE AND CALCIUM CHLORIDE: 600; 310; 30; 20 INJECTION, SOLUTION INTRAVENOUS at 12:44

## 2023-02-03 RX ADMIN — HYDROMORPHONE HYDROCHLORIDE 0.2 MG: 0.2 INJECTION, SOLUTION INTRAMUSCULAR; INTRAVENOUS; SUBCUTANEOUS at 10:42

## 2023-02-03 RX ADMIN — NYSTATIN 500000 UNITS: 100000 SUSPENSION ORAL at 14:49

## 2023-02-03 RX ADMIN — ONDANSETRON 4 MG: 2 INJECTION INTRAMUSCULAR; INTRAVENOUS at 12:47

## 2023-02-03 RX ADMIN — CEFEPIME HYDROCHLORIDE 2 G: 2 INJECTION, POWDER, FOR SOLUTION INTRAVENOUS at 00:40

## 2023-02-03 RX ADMIN — SENNOSIDES AND DOCUSATE SODIUM 2 TABLET: 50; 8.6 TABLET ORAL at 22:09

## 2023-02-03 RX ADMIN — NYSTATIN 500000 UNITS: 100000 SUSPENSION ORAL at 09:56

## 2023-02-03 RX ADMIN — PHENYLEPHRINE HYDROCHLORIDE 150 MCG: 10 INJECTION INTRAVENOUS at 12:45

## 2023-02-03 RX ADMIN — LEUCOVORIN CALCIUM 245 MG: 350 INJECTION, POWDER, LYOPHILIZED, FOR SOLUTION INTRAMUSCULAR; INTRAVENOUS at 22:11

## 2023-02-03 RX ADMIN — DOXYCYCLINE HYCLATE 100 MG: 100 CAPSULE ORAL at 09:48

## 2023-02-03 RX ADMIN — PHENYLEPHRINE HYDROCHLORIDE 100 MCG: 10 INJECTION INTRAVENOUS at 13:12

## 2023-02-03 RX ADMIN — GABAPENTIN 100 MG: 100 CAPSULE ORAL at 14:49

## 2023-02-03 RX ADMIN — POTASSIUM CHLORIDE 20 MEQ: 1.5 POWDER, FOR SOLUTION ORAL at 20:50

## 2023-02-03 RX ADMIN — MICONAZOLE NITRATE: 2 POWDER TOPICAL at 09:54

## 2023-02-03 RX ADMIN — OXYCODONE HYDROCHLORIDE 2.5 MG: 5 TABLET ORAL at 18:23

## 2023-02-03 RX ADMIN — LEUCOVORIN CALCIUM 245 MG: 350 INJECTION, POWDER, LYOPHILIZED, FOR SOLUTION INTRAMUSCULAR; INTRAVENOUS at 10:35

## 2023-02-03 RX ADMIN — FLUCONAZOLE 100 MG: 100 TABLET ORAL at 09:48

## 2023-02-03 RX ADMIN — MICONAZOLE NITRATE: 2 POWDER TOPICAL at 20:41

## 2023-02-03 RX ADMIN — NYSTATIN 500000 UNITS: 100000 SUSPENSION ORAL at 18:06

## 2023-02-03 RX ADMIN — GABAPENTIN 100 MG: 100 CAPSULE ORAL at 19:36

## 2023-02-03 RX ADMIN — CYANOCOBALAMIN 1000 MCG: 1000 INJECTION, SOLUTION INTRAMUSCULAR at 09:52

## 2023-02-03 RX ADMIN — FILGRASTIM 480 MCG: 480 INJECTION, SOLUTION INTRAVENOUS; SUBCUTANEOUS at 19:36

## 2023-02-03 RX ADMIN — PANTOPRAZOLE SODIUM 40 MG: 40 INJECTION, POWDER, FOR SOLUTION INTRAVENOUS at 06:37

## 2023-02-03 RX ADMIN — HYDROMORPHONE HYDROCHLORIDE 0.2 MG: 0.2 INJECTION, SOLUTION INTRAMUSCULAR; INTRAVENOUS; SUBCUTANEOUS at 19:36

## 2023-02-03 RX ADMIN — OXYCODONE HYDROCHLORIDE 2.5 MG: 5 TABLET ORAL at 05:14

## 2023-02-03 RX ADMIN — ARIPIPRAZOLE 5 MG: 5 TABLET ORAL at 09:47

## 2023-02-03 RX ADMIN — FOLIC ACID 2 MG: 1 TABLET ORAL at 09:47

## 2023-02-03 RX ADMIN — DULOXETINE HYDROCHLORIDE 120 MG: 60 CAPSULE, DELAYED RELEASE ORAL at 09:48

## 2023-02-03 RX ADMIN — OXYCODONE HYDROCHLORIDE 2.5 MG: 5 TABLET ORAL at 22:09

## 2023-02-03 RX ADMIN — LEUCOVORIN CALCIUM 245 MG: 350 INJECTION, POWDER, LYOPHILIZED, FOR SOLUTION INTRAMUSCULAR; INTRAVENOUS at 04:23

## 2023-02-03 RX ADMIN — ATORVASTATIN CALCIUM 40 MG: 40 TABLET, FILM COATED ORAL at 19:36

## 2023-02-03 RX ADMIN — LEUCOVORIN CALCIUM 245 MG: 350 INJECTION, POWDER, LYOPHILIZED, FOR SOLUTION INTRAMUSCULAR; INTRAVENOUS at 18:06

## 2023-02-03 RX ADMIN — PANTOPRAZOLE SODIUM 40 MG: 40 INJECTION, POWDER, FOR SOLUTION INTRAVENOUS at 18:06

## 2023-02-03 ASSESSMENT — ACTIVITIES OF DAILY LIVING (ADL)
ADLS_ACUITY_SCORE: 52
ADLS_ACUITY_SCORE: 50
ADLS_ACUITY_SCORE: 52
ADLS_ACUITY_SCORE: 52
ADLS_ACUITY_SCORE: 50
ADLS_ACUITY_SCORE: 52
ADLS_ACUITY_SCORE: 50
ADLS_ACUITY_SCORE: 50
ADLS_ACUITY_SCORE: 52
ADLS_ACUITY_SCORE: 50
ADLS_ACUITY_SCORE: 52
ADLS_ACUITY_SCORE: 50

## 2023-02-03 ASSESSMENT — COPD QUESTIONNAIRES
COPD: 1
CAT_SEVERITY: MILD

## 2023-02-03 ASSESSMENT — ENCOUNTER SYMPTOMS: DYSRHYTHMIAS: 1

## 2023-02-03 NOTE — PROGRESS NOTES
Nephrology Progress Note  02/03/2023         Assessment & Recommendations:   61-year-old female with rheumatoid arthritis, recently started on methotrexate -admitted with severe pancytopenia, neutropenic fever, intertrigo, severe sepsis, KRISS    1 acute renal failure-normal creatinine as of 2021.  Creatinine was up to 1.7 in May 2022, and more recently around 2.1.  Unclear cause of rising creatinine in 2022.  Attributed to dehydration but never really went down.  -Now admitted with a creatinine of 3.2 in setting of sepsis, hypotension, pancytopenia, poor oral intake, use of lisinopril.  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  Urinalysis with multiple hyaline casts and amorphous crystals consistent with prerenal state.  Relatively bland with 5 RBCs per high-power field, 1+ protein and no casts.  -CT abdomen with normal-looking kidneys.  Excellent urine output.    -Creatinine significantly down to 1.7-1.8 which is similar to her level in May    2 pancytopenia-hematology on board.  Noted to have severe vitamin B12 deficiency and folate deficiency.  May need bone marrow biopsy.  Several labs are pending.  Methotrexate on hold.  Methotrexate level low.  -Status post Neupogen and and vitamin B12/folic acid  -PRBC and platelet transfusion per primary team    -Noted plans for bone marrow biopsy today    3 neutropenic fever-no apparent source of infection.  On cefepime and doxycycline.  -Infectious work-up underway.  - On cefepime     4 intertrigo-on fluconazole.    5 metabolic acidosis-with renal failure.  Improving on sodium bicarbonate.  Urine pH appropriate.    Discussion/recommendation-  -creatinine back to her levels in May.  Okay to continue low-dose of IV fluid until oral intake improves.  Monitor for signs of volume overload.  Continue to hold lisinopril.  Daily renal panel.    Discussed with primary team.  Nephrology will sign off.  Please call back with questions.    Sarahi Kelley MD  [facility]  "Consultants - Nephrology   [phone]      Interval History :   Seen / examined.   No acute issues overnight.  Noted plans for bone marrow biopsy today  Feels tired.   Oral intake remains poor  Good UOP   Breathing okay    Review of Systems:   A 4 point review of systems was negative except as noted above.  Notably: poor appetite. no nausea or vomiting or diarrhea.  no confusion,  no fever or chills    Physical Exam:   I/O last 3 completed shifts:  In: 960 [P.O.:960]  Out: 2400 [Urine:2400]  /67 (BP Location: Right arm)   Pulse 93   Temp 98.6  F (37  C) (Axillary)   Resp 18   Ht 1.676 m (5' 6\")   Wt 139.3 kg (307 lb)   SpO2 96%   BMI 49.55 kg/m      GENERAL APPEARANCE: alert and no distress  EYES:  no scleral icterus, pupils equal  PULM: Breath sounds diminished at bases  CV: regular rhythm, normal rate, no rub     -JVP -     -edema +  GI: soft, non tender,   NEURO: mentation intact and speech normal, no asterixis       Labs:   All labs reviewed by me  Electrolytes/Renal -   Recent Labs   Lab Test 02/03/23  0734 02/02/23  1313 02/01/23  0734 01/31/23  0846 01/31/23  0757 01/30/23  2043 10/04/19  1410 10/04/19  0704 10/03/19  2049 10/03/19  0604 10/02/19  0545    141 144  --  141  --    < > 137   < > 139 140   POTASSIUM 3.0* 3.3* 3.4  --  3.4  --    < > 4.2   < > 3.8 3.8   CHLORIDE 110* 107 114*  --  111*  --    < > 105   < > 105 106   CO2 25 24 20*  --  17*  --    < > 26   < > 25 26   BUN 42.4* 41.4* 43.9*  --  44.3*  --    < > 29   < > 22 18   CR 1.83* 1.79* 2.41*  --  2.52*  --    < > 0.93   < > 0.79 0.76   GLC 96 99 91   < > 86  --    < > 97   < > 96 94   MARIA INES 7.9* 8.5* 8.0*  --  8.1*  --    < > 9.2   < > 9.1 9.0   MAG  --   --  2.0  --  2.2 2.4*   < > 2.0   < > 2.1 2.2   PHOS  --   --   --   --   --   --   --  4.2  --  4.3 4.4    < > = values in this interval not displayed.       CBC -   Recent Labs   Lab Test 02/03/23  0734 02/02/23  0800 02/01/23  0734   WBC 0.3* 0.4* 0.3*   HGB 7.1* 7.2* " 7.2*   PLT 21* 39* 25*       LFTs -   Recent Labs   Lab Test 01/28/23  2221 12/06/22  1049 10/24/22  1138 05/24/22  0959   ALKPHOS 57 65  --  70   BILITOTAL 0.7 0.3  --  0.4   ALT 13 12  --  15   AST 28 12  --  14   PROTTOTAL 6.1* 7.5 8.0 8.2   ALBUMIN 2.5* 3.6  --  3.5       Iron Panel -   Recent Labs   Lab Test 01/29/23  0626 12/18/19  1129 09/24/16  1304   IRON 47 46 71   IRONSAT 40 17  --    RAMÍREZ 1,499* 125 83           Current Medications:    ARIPiprazole  5 mg Oral Daily     atorvastatin  40 mg Oral QPM     ceFEPIme  2 g Intravenous Q12H     cyanocobalamin  1,000 mcg Intramuscular Daily     DULoxetine  120 mg Oral Daily     filgrastim  480 mcg Subcutaneous Daily at 8 pm     [START ON 2/4/2023] fluconazole  200 mg Oral Daily     folic acid  2 mg Oral Daily     gabapentin  100 mg Oral TID     leucovorin calcium  100 mg/m2 Intravenous Q6H     miconazole   Topical BID     nystatin  500,000 Units Swish & Spit 4x Daily     pantoprazole  40 mg Intravenous Q12H     sodium chloride (PF)  3 mL Intracatheter Q8H       - MEDICATION INSTRUCTIONS -       - MEDICATION INSTRUCTIONS -       Warfarin Therapy Reminder       Sarahi Kelley MD

## 2023-02-03 NOTE — PROGRESS NOTES
[facility]    Infectious Disease Progress Note    Date of Service : 02/03/2023     Assessment:  62 YF with RA on Methotrexate (recently transitioned from subcutaneous dosing to PO), morbid obesity , hx of PEA cardiac arrest due to PE who has been hospitalized with progressive fatigue, was found down and is noted to have severe pancytopenia as well as lactic acidosis related to neutropenic fever. She has significant candida intertrigo infection in the inframammary and groin folds. No bacteremia or other focus of infection found thus far except for mild periumbilical cellulitis. She had severe B12/folate deficiency which has been corrected, however, pancytopenia remains persistent and severe. Bone marrow biopsy is planned     -Pancytopenia ? Methotrexate toxicity vs other hematological disorder  -Neutropenic fever resolved  -B12/folate deficiency  -Abnormal TTE with concern for vegetation vs dissection, LETA is planned  -Mild periumbilical cellulitis and left ankle erythema  -Fungal intertrigo infection in inframammary and groin skin folds  -KRISS   -Lactic acidosis  -Rheumatoid arthritis - on Methotrexate thrice/week  -Hx of PE with PEA cardiac arrest requiring ECMO  -Chronic left pleural effusion  -Morbid obesity     Recommendations  1. Bone marrow biopsy is planned  2. Maintain on Cefepime for neutropenia for now and continue Fluconazole for candida intertrigo infection  3. CXR    Scarlett Zhu MD    Interval History   Continues to feel poorly, no focal complaints but overall feels tired and fatigued, ongoing pain in groin folds and infra mammary folds. Bone marrow biopsy is planned today. Has a cough    Physical Exam   Temp: 98.6  F (37  C) Temp src: Axillary BP: 118/67 Pulse: 93   Resp: 18 SpO2: 96 % O2 Device: None (Room air)    Vitals:    01/29/23 1935 02/02/23 0700 02/03/23 0514   Weight: 129 kg (284 lb 6.3 oz) 133.8 kg (295 lb) 139.3 kg (307 lb)     Vital Signs with Ranges  Temp:  [97.5  F  (36.4  C)-98.6  F (37  C)] 98.6  F (37  C)  Pulse:  [] 93  Resp:  [17-18] 18  BP: (112-136)/(67-81) 118/67  SpO2:  [94 %-97 %] 96 %    Constitutional: Awake, alert, chronically ill appearing female, morbidly obese  HEENT : nasal packing in place  Lungs: clear  Cardiovascular: S1S2, murmur  Abdomen: obese, periumbilical hernia with surrounding cellulitis,   Skin: crusting and intertrigo in groin, inframammary and popliteal skin folds  MS : L ankle erythema and warmth    Other:    Medications     - MEDICATION INSTRUCTIONS -       - MEDICATION INSTRUCTIONS -       IV infusion builder WITH additives 50 mL/hr at 02/02/23 2101     Warfarin Therapy Reminder         ARIPiprazole  5 mg Oral Daily     atorvastatin  40 mg Oral QPM     ceFEPIme  2 g Intravenous Q12H     cyanocobalamin  1,000 mcg Intramuscular Daily     DULoxetine  120 mg Oral Daily     filgrastim  480 mcg Subcutaneous Daily at 8 pm     fluconazole  100 mg Oral Daily     folic acid  2 mg Oral Daily     gabapentin  100 mg Oral TID     leucovorin calcium  100 mg/m2 Intravenous Q6H     miconazole   Topical BID     nystatin  500,000 Units Swish & Spit 4x Daily     pantoprazole  40 mg Intravenous Q12H     sodium chloride (PF)  3 mL Intracatheter Q8H       Data   All microbiology laboratory data reviewed.  Recent Labs   Lab Test 02/03/23  0734 02/02/23  0800 02/01/23  0734   WBC 0.3* 0.4* 0.3*   HGB 7.1* 7.2* 7.2*   HCT 22.4* 22.4* 22.0*   MCV 89 88 87   PLT 21* 39* 25*     Recent Labs   Lab Test 02/03/23  0734 02/02/23  1313 02/01/23  0734   CR 1.83* 1.79* 2.41*     Recent Labs   Lab Test 01/13/21  1419   SED 15

## 2023-02-03 NOTE — PLAN OF CARE
Goal Outcome Evaluation:         Pt A&Ox4, flat affect, drowsy. VSS on RA. Pt A2 w/lift, PT did in-bed exercises today. Wound cares completed with WOC this AM. Premedication recommended for wound care, PRN IV Dilaudid ordered. T/R q2hrs, as pt allows. Pt on minced/moist diet with mildly thickened liquids, able to have ice chips between meals. Mouth sores/pain, magic mouthwash and nystatin given with minimal effect. Purewick in place for incontinence and lack of mobility. Plts 39 this AM, order to transfuse 1 unit of platelets. L PIV SL, R PIV infusing 1/2NS w/Sodium Bicarb at 50mL/hr. Bone marrow biopsy scheduled for tomorrow at 1230, NPO at midnight. Hem/onc, ID, Neph, WOC, SLP following. Discharge pending clinical improvement.

## 2023-02-03 NOTE — PROCEDURES
The patient was positively identified and informed consent was obtained (see the completed Affirmation of Consent for Bone Marrow Aspiration and/or Biopsy Procedure(s) form in the patient's chart). The patient was placed in the lateral position and the bony landmarks of the pelvis were identified. Medical staff reconfirmed the patient's name, date of birth and procedure. The skin over the posterior iliac crest was scrubbed and draped in a sterile fashion. The local area of the procedure was anesthetized with a total of 6 mL of 1% Lidocaine and a small incision was made.  The patient did receive conscious sedation.    Trephine bone marrow core(s) was/were obtained from the left posterior iliac crest. Bone marrow aspirate was obtained from the left posterior iliac crest for: morphology with possible immunophenotyping and/or cytogenetics and molecular diagnostics    Direct pressure was applied to the biopsy site with sterile gauze. The biopsy site was cleaned with alcohol and a sterile dressing was placed over the biopsy incision using a pressure bandage. The patient was then placed in the supine position to maintain pressure on the biopsy site. Post-procedure wound care instructions, including routine dressing instructions and analgesia, were given to the patient. The procedure was completed without complication.

## 2023-02-03 NOTE — PROGRESS NOTES
[facility]    Medicine Progress Note - Hospitalist Service    Date of Admission:  1/28/2023    Assessment & Plan     Shira Rodriguez is a 62-year-old female with rheumatoid arthritis on methotrexate, prior massive PE on warfarin, morbid obesity who presented on 1/28/2023 with severe sepsis.  She was found unresponsive in her home. She  Was hypotensive and  had temp of 100.2 in route to hospital.      Severe sepsis-present on admission  candida intertrigo  Neutropenic fever  Lactic acidosis -Resolved   -  Presented With lactic acidosis of 3.2, hypotension, HR of 105, WBC of 0.4, acute metabolic encephalopathy - met criteria for severe sepsis on admission  - CT C/A/P showed small chronic left pleural effusion, small fluid around distal esophagus and duodenum  - she was  Noted to have panniculitis under abdominal pannus, significant redness under her breasts.  Source could be panniculitis, mucositis due to severe neutropenia, duodenitis.  - UA does not suggest UTI, no pneumonia, COVID-19, influenza and RSV negative and   Has a cough which is chronic. Has had severe throat pain for 2 days.  Has diverticulosis but no diverticulitis  - Blood cultures and urine cultures negative so far  - Blood pressure improved with IV fluids and is currently in normal range.    - Lactic acidosis has resolved  -She was initially started on broad-spectrum antibiotics including cefepime, vancomycin and infectious disease and oncology were consulted.  -Her antibiotics were switched to cefepime, doxycycline and fluconazole( to cover for Candida intertrigo)  -She has infectious work-up sent including tick panel which has been pending, and her serum Fungitell assay( 1,3 beta d glucan) is positive and tested negative for hepatitis B and C and negative for HIV, she also detected positive for EBV IgG, CMV is negative and Blastomyces and histoplasmosis is are negative  And anaplasma, Rickettsia, elhricia are  negative  -He also had immunological work-up and C3 and C4 are negative, ANCA is negative and double-stranded DNA is negative, methotrexate levels are undetectable  -Of note she had an TTE done which was concerning for dissection versus vegetation and she underwent LETA on 1/31 which does not show any evidence of dissection and no evidence of any vegetation  -ID and oncology/hematology on board and will continue patient with current antibiotics  - she completed 5 days of antibiotics with doxycycline and of note as per infectious diseasenclear significance ofBeta D glucan at this time, no obvious systemic fungal infection and  Continue with Fluconazole for candida intertrigo  And given her neutropenia continue the patient with cefepime and her cultures have been negative  -Continue with Neupogen per hematology     Acute metabolic encephalopathy-secondary to severe sepsis-resolved  -Head CT-did not show any acute changes  -On exam she is at baseline     Pancytopenia with severe leukopenia and neutropenia, moderate anemia and thrombocytopenia  Severe vitamin B12 deficiency and folate deficiency  - On 12/7/2022, CBC showed hemoglobin 13.4, WBC 10.6, platelets 271  - On admission, 1/28 - CBC showed Hb 9.4, WBC 0.4, ANC 0.1, platelets 59  - Iron studies showed normal iron saturation, low iron binding capacity indicating anemia of chronic disease.  Reticulocyte count suppressed  - B12 low at < 150 and folate low at 2.8  -elevated D-dimer of 7.38, fibrinogen is 932 and PTT is 42 with INR of 1.99  - Peripheral smear was done which shows - Moderate to marked pancytopenia.Negative for schistocytes. Negative for circulating blasts on scanning.  -I also reviewed the results of flow cytometry and it shows polytypic B cells, no aberrant immunophenotype on T cells and rare to absent CD34 positive blasts  -She does have elevated Kappa and lambda free light chains, monoclonal IgM immunoglobulin of lambda light chain type, IgM is  elevated at 395 and IgG and IgA are within normal limits and also reviewed the report of serum electrophoresis  -For oncology of patient's elevatedfibrinogen level and a significantly elevated D-dimer argues against DIC  -Serum methotrexate levels are normal  -Her platelet count has been on the lower side and she was given 1 unit Each on 1/31,2/1 and 2/2 and her platelet count today is 21  and will give her another unit of platelet   -She is also receiving Neupogen with goal of ANC more than 1500  -Status one unit of blood on 1/31/2023 and hemoglobin today is 7.2  -she was started on B12 and folate replacement and given no improvement was started on 2/1/2023 with folinic acid - started off with 1000 mg/m  x 2 doses (done for high dose methotrexate toxicity) and  Will drop to 100 mg/m  now.   -We will continue to monitor her hemoglobin closely and if her hemoglobin is less than 7 we will give one unit of blood  -He is scheduled for  bone marrow biopsy  Today and agrees     Acute kidney injury on top of CKD 4- Improving  Mild anion gap metabolic acidosis-resolved   - secondary to lactic acidosis and acute kidney injury, pH 7.25 on admission  - Baseline creatinine 2-2.2.  Creatinine on admission 3.25.    -Nephrology has been consulted since admission and she has been started on bicarb drip and her creatinine is being monitored and has improved to 1.83 and her bicarb has been normalized   - spoke with nephrology and we will discontinue iv fluids as monitor for now      Probable esophagitis  Probable duodenitis  -CT on admission showed small amount of fluid adjacent to distal thoracic esophagus and scattered posterior mediastinal lymph nodes along esophagus of uncertain etiology but could be secondary to esophagitis or other inflammatory process.  CT scan also showed small edema around second and third portion of duodenum.  -Lipase is normal  -On IV Protonix, continue and will need to have EGD when she is more stable       Hypomagnesemia-resolved  -She had magnesium of 1.4 on admission and was started on magnesium replacement and magnesium is normal     Hypokalemia  -Her potassium is 3 and we will start her on replacement protocol     Mild hypovolemic hyponatremia-resolved  -Sodium 132 on admission and was given IV fluids and sodium normalized     Type II MI due to severe sepsis  -High-sensitivity troponin mildly elevated at 48 and repeat was normal and can be due to demand     Hyperuricemia, uric acid 8.9     History of massive PE causing PEA arrest, 9/2019  -Patient was started on warfarin and her INR was 2.80 on 1/30 and her pharmacy was consulted for Coumadin management and of note her INR always have been therapeutic and she has not received any dose of Coumadin during this hospital stay and INR today is 2.58     Rheumatoid arthritis  -Had been on methotrexate for some time, resumed about 2 weeks ago due to worsening back and shoulder pain  -Hold methotrexate      3 cm right adrenal nodule  -Noted on CT.  Was present on previous imaging as well.  Likely benign  -A.m. cortisol 1/29, normal at 43.9  -Will need to keep a close eye and follow as outpatient with her primary care physician     COPD  chronic cough  -No acute exacerbation  - has chronic cough for years and follow with pulmonary at [place]     Chronic major depression  Chronic pain  - see recent pain clinic visit summary from 1/17/2023 for background information  - Continue Abilify, Cymbalta and Neurontin     Morbid obesity, BMI 46     Hypoalbuminemia, albumin 2.5     Panniculitis  Candidal rash-under pannus, breasts  -Wound care on board and continue with miconazole powder     Essential hypertension  -PTA-lisinopril  -Hold lisinopril due to acute kidney injury and hypotension on admission  -We will continue to monitor her blood pressure closely and they have been stable     Severe Obesity: Estimated body mass index is 45.9 kg/m  as calculated from the  "following:    Height as of this encounter: 1.676 m (5' 6\").    Weight as of this encounter: 129 kg (284 lb 6.3 oz).    Dysphagia   Mucositis   - she has been complaining of throat pain and on exam of her mouth there are area of ulcer and we will consult speech  - we will also use magic mouth wash and nystatin  - speech consulted and reviewed notes and appreciate input and will need to have video swallow study with stable  - may need EGD a above but will monitor for now and patient wants to hold consulting GI for now       Diet: NPO per Anesthesia Guidelines for Procedure/Surgery Except for: Meds    DVT Prophylaxis: Warfarin  [place] Catheter: Not present  Lines: None     Cardiac Monitoring: ACTIVE order. Indication: Severe sepsis  Code Status: Full Code      Clinically Significant Risk Factors        # Hypokalemia: Lowest K = 3 mmol/L in last 2 days, will replace as needed       # Hypoalbuminemia: Lowest albumin = 2.5 g/dL at 1/28/2023 10:21 PM, will monitor as appropriate   # Thrombocytopenia: Lowest platelets = 39 in last 2 days, will monitor for bleeding         # Severe Obesity: Estimated body mass index is 49.55 kg/m  as calculated from the following:    Height as of this encounter: 1.676 m (5' 6\").    Weight as of this encounter: 139.3 kg (307 lb).          Disposition Plan     Expected Discharge Date: 02/06/2023                  Prem Levy MD  Hospitalist Service  [facility]  Securely message with [employer] (more info)  Text page via [employer] Paging/Directory   ______________________________________________________________________    Interval History     I did see the patient today and she denies any fever, chills, nausea, vomiting.  I did discuss the results of few of the labsShe is scheduled for bone marrow biopsy today and agrees with the same    Pain over wounds better during dressing change with iv dilaudid     Spoke with RN and nephrololgy in person and also spoke with her sister " rajiv this am and plan discussed      Physical Exam   Vital Signs: Temp: 98.6  F (37  C) Temp src: Axillary BP: 118/67 Pulse: 93   Resp: 18 SpO2: 96 % O2 Device: None (Room air)    Weight: 307 lbs 0 oz        General: Has a flat affect but is alert oriented x3  Oral : area of ulcer  Respiratory:CTLA  Cardiovascular: Regular rate , S1 and S2 normal with no murmer or rubs or gallops  Abdomen:   soft , non tender nondistended   Skin: She has significant panniculitis/candidia around the groin, breast folds bilaterally  Musculoskeletal: Normal Range of motion over upper and lower extremities bilaterally   Psychiatric: cooperative     Medical Decision Making             Time spent in care of patient is 51minutes and I reviewed the patient basic metabolic panel, CBC, discussed with nephrology , hematology and ID and sister  , further treatment plan formulated    High complexity        Data     I have personally reviewed the following data over the past 24 hrs:    N/A  \   N/A   / N/A     144 110 (H) 42.4 (H) /  96   3.0 (L) 25 1.83 (H) \       INR:  2.58 (H) PTT:  N/A   D-dimer:  N/A Fibrinogen:  N/A       Imaging results reviewed over the past 24 hrs:   No results found for this or any previous visit (from the past 24 hour(s)).  I also reviewed the report of peripheral smear, flow cytometry, hematological work-up, Bd glucan hepatitis and HIV panel and serum complement and

## 2023-02-03 NOTE — ANESTHESIA CARE TRANSFER NOTE
Patient: Shira Rodriguez    Procedure: Procedure(s):  bone marrow biopsy       Diagnosis: Aplastic anemia (H) [D61.9]  Diagnosis Additional Information: No value filed.    Anesthesia Type:   MAC     Note:    Oropharynx: oropharynx clear of all foreign objects  Level of Consciousness: drowsy  Oxygen Supplementation: face mask    Independent Airway: airway patency satisfactory and stable  Dentition: dentition unchanged  Vital Signs Stable: post-procedure vital signs reviewed and stable  Report to RN Given: handoff report given  Patient transferred to: PACU    Handoff Report: Identifed the Patient, Identified the Reponsible Provider, Reviewed the pertinent medical history, Discussed the surgical course, Reviewed Intra-OP anesthesia mangement and issues during anesthesia, Set expectations for post-procedure period and Allowed opportunity for questions and acknowledgement of understanding      Vitals:  Vitals Value Taken Time   BP     Temp     Pulse     Resp     SpO2         Electronically Signed By: OK Ross CRNA  February 3, 2023  1:20 PM

## 2023-02-03 NOTE — PROGRESS NOTES
[facility]  Hematology-Oncology Follow-up Note      Today's Date:February 3, 2023   Date of Admission:  1/28/2023  Reason for Consult: Pancytopenia with severe B12 and folate deficiencies      ASSESSMENT:  Shira Rodriguez is a 62 year old female who has been admitted with septic shock and neutropenic fevers    Neutropenic fever   Bacterial sepsis  Acute kidney injury/ acute renal failure  Pancytopenia with severe neutropenia, moderate anemia, with severe B12 and folate deficiency  Morbid obesity    It is clear that despite correcting the severe B12 and folate deficiencies Shira has not shown any improvement in terms of her cytopenias or general health. I have requested a bone marrow biopsy which is scheduled for later today. Her total white cell count is 0.4k and her absolute reticulocyte count is only 3k. Her platelet counts have been staying below 50k. She has not been getting her coumadin since admission but her INR have been well within therapeutic range. We will continue to hold administering any further coumadin. We have been transfusing her with platelet daily due to her nose bleeds and therapeutic INR.     I have extensively reviewed the procedure for bone marrow biopsy with her. This is performed under sedation and with local anaesethetic. Bone marrow is the soft tissue inside bones that helps form blood cells. It is found in the hollow part of most bones. The procedure involves collection of bone marrow and a small amount of marrow fluid aspirate for analysis with a biopsy/aspirate needle. Only the finished cells are released in the circulation and bone marrow biopsy/aspirate helps assess precursor cells for abnormalities. It helps establish primary hematologic disorders like myelodysplastic/ myeloproliferative disorders, leukemia, lymphoma and multiple myeloma.  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    She is n.p.o. for the procedure.  Her  Coumadin has been held during this admission but her INR have remained well within therapeutic range.      RECOMMENDATIONS:  - Bone marrow biopsy today 2/3/23 at noon;  since she has not shown any improvement despite B12 and folate supplementation.    she should be NPO except meds for procedure after 6 am  - Continue to replace vitamin B12 and folate   Vitamin B12 1000 mcg daily x 5 completed and now weekly     Folate 5 mg on day 1 and then 2 mg daily   Will check for anti intrinsic factor and anti parietal cell antibodies, anti gliadin and transglutaminase antibodies  B12 deficient epithelial cells in jejunum can't absorb folate    -  Methotrexate undetectable. Still it is hard to rule out methotrexate toxicity.    I have started her on leucovorin    She is getting folinic acid - started off with 1000 mg/m  x 2 doses (done for high dose methotrexate toxicity); dropped to 100 mg/m  now.   We will decide on discontinuing this after bone marrow biopsy results  - Recommend platelet transfusion to keep platelet counts > 50k with therapeutic INR and nose bleeds   She had bilateral life threatening pulmonary embolism with cardiac arrest and now has d-dimer of 7.38  - Continue neupogen for severe neutropenia with sepsis - 480 mcg daily until ANC > 1500  - Check CBC with diff daily  - Patient complains of mouth sores. With severe neutropenia and ongoing antibiotics she is at risk for fungal infections.    She has been started on nystatin and fluconazole.   - Agree with management as per primary; LETA has been negative      Over 50 min spent on day of visit including review of tests, obtaining/reviewing separately obtained history/physical exam, counseling patient, ordering medications/tests/procedures, communicating with PCP/consultants, and documenting in electronic medical record.    Julio César Hills  Hematologist and Medical Oncologist  BRITTANI Mandel      INTERIM HISTORY:  Shira was followed today and was with her niece in  her room. She is not feeling any better as he had but was happy to see her niece. She again had nose bleeds earlier in the day.      MEDICATIONS:  Current Facility-Administered Medications   Medication     acetaminophen (TYLENOL) tablet 650 mg    Or     acetaminophen (TYLENOL) Suppository 650 mg     ARIPiprazole (ABILIFY) tablet 5 mg     atorvastatin (LIPITOR) tablet 40 mg     benzocaine-menthol (CHLORASEPTIC) 6-10 MG lozenge 1 lozenge     ceFEPIme (MAXIPIME) 2 g vial to attach to  ml bag for ADULTS or 50 ml bag for PEDS     cyanocobalamin injection 1,000 mcg     DULoxetine (CYMBALTA) DR capsule 120 mg     filgrastim (NEUPOGEN) injection syringe SOSY 480 mcg     fluconazole (DIFLUCAN) tablet 100 mg     folic acid (FOLVITE) tablet 2 mg     gabapentin (NEURONTIN) capsule 100 mg     Give   of usual dose of LONG ACTING insulin AM of procedure IF diabetic     HOLD: Insulin - RAPID/SHORT acting AM of procedure IF diabetic     HOLD: Insulin - REGULAR AM of procedure IF diabetic     HOLD: Oral hypoglycemics AM of procedure IF diabetic     HYDROmorphone (DILAUDID) injection 0.2 mg     ipratropium - albuterol 0.5 mg/2.5 mg/3 mL (DUONEB) neb solution 3 mL     leucovorin calcium injection 245 mg     lidocaine (LMX4) cream     lidocaine 1 % 0.1-1 mL     magic mouthwash suspension (diphenhydramine, lidocaine, aluminum-magnesium & simethicone)     May take oral meds with a sip of water, the morning of LETA procedure.     melatonin tablet 3 mg     miconazole (MICATIN) 2 % powder     NaCl 0.45 % 1,000 mL with sodium bicarbonate 75 mEq/L infusion     naloxone (NARCAN) injection 0.2 mg    Or     naloxone (NARCAN) injection 0.4 mg    Or     naloxone (NARCAN) injection 0.2 mg    Or     naloxone (NARCAN) injection 0.4 mg     nitroGLYcerin (NITROSTAT) sublingual tablet 0.4 mg     nystatin (MYCOSTATIN) suspension 500,000 Units     ondansetron (ZOFRAN ODT) ODT tab 4 mg    Or     ondansetron (ZOFRAN) injection 4 mg     oxyCODONE IR  "(ROXICODONE) half-tab 2.5 mg     pantoprazole (PROTONIX) IV push injection 40 mg     senna-docusate (SENOKOT-S/PERICOLACE) 8.6-50 MG per tablet 1 tablet    Or     senna-docusate (SENOKOT-S/PERICOLACE) 8.6-50 MG per tablet 2 tablet     sodium chloride (PF) 0.9% PF flush 3 mL     sodium chloride (PF) 0.9% PF flush 3 mL     sodium chloride (PF) 0.9% PF flush 3 mL     WARFARIN THERAPY REMINDER     Facility-Administered Medications Ordered in Other Encounters   Medication     sodium chloride (PF) 0.9% PF flush 10 mL           ALLERGIES:  Allergies   Allergen Reactions     Adhesive Tape Blisters     Erythromycin Rash         PHYSICAL EXAM:  Vital signs:  Temp: 98.6  F (37  C) Temp src: Axillary BP: 118/67 Pulse: 93   Resp: 18 SpO2: 96 % O2 Device: None (Room air) Oxygen Delivery: 2 LPM Height: 167.6 cm (5' 6\") Weight: 139.3 kg (307 lb)  Estimated body mass index is 49.55 kg/m  as calculated from the following:    Height as of this encounter: 1.676 m (5' 6\").    Weight as of this encounter: 139.3 kg (307 lb).    LABS:  Recent Labs   Lab Test 02/03/23  0734 02/02/23  1313 02/01/23  0734 01/31/23  1133 01/31/23  0846 01/31/23  0757 01/30/23  1217    141 144  --   --  141 138   POTASSIUM 3.0* 3.3* 3.4  --   --  3.4 3.6   CHLORIDE 110* 107 114*  --   --  111* 112*   CO2 25 24 20*  --   --  17* 14*   ANIONGAP 9 10 10  --   --  13 12   BUN 42.4* 41.4* 43.9*  --   --  44.3* 47.6*   CR 1.83* 1.79* 2.41*  --   --  2.52* 2.65*   GLC 96 99 91 84 85 86 82   MARIA INES 7.9* 8.5* 8.0*  --   --  8.1* 8.2*     Recent Labs   Lab Test 02/01/23  0734 01/31/23  0757 01/30/23 2043 01/30/23  1217 01/28/23  2221 10/05/19  0521 10/04/19  0704 10/03/19  2049 10/03/19  0604 10/02/19  0545 10/01/19  0548 09/30/19  0625   MAG 2.0 2.2 2.4* 1.5* 1.4*   < > 2.0   < > 2.1 2.2 1.9 2.2   PHOS  --   --   --   --   --   --  4.2  --  4.3 4.4 3.9 4.0    < > = values in this interval not displayed.     Recent Labs   Lab Test 02/03/23  0734 02/02/23  0800 " 02/01/23  0734 01/31/23  1009 01/31/23  0838 01/29/23  0626 01/28/23  2221 12/07/22  1340 12/06/22  1049 05/24/22  0959 01/13/21  1419 09/24/20  1415   WBC 0.3* 0.4* 0.3* 0.2* 0.2*   < > 0.4* 10.6 15.4* 10.0   < > 6.8   HGB 7.1* 7.2* 7.2* 6.9* 6.5*   < > 9.4* 13.4 13.9 14.9   < > 15.0   PLT 21* 39* 25* 24* 23*   < > 59* 271 369 290   < > 146*   MCV 89 88 87 87 87   < > 90 88 89 96   < > 91   NEUTROPHIL  --   --   --   --   --   --  24 78 86 75  --  77.7    < > = values in this interval not displayed.     Recent Labs   Lab Test 01/29/23  1827 01/28/23  2221 12/06/22  1049 10/24/22  1138 05/24/22  0959 10/06/19  0702 10/05/19  0521   BILITOTAL  --  0.7 0.3  --  0.4   < > 0.4   ALKPHOS  --  57 65  --  70   < > 65   ALT  --  13 12  --  15   < > 18   AST  --  28 12  --  14   < > 12   ALBUMIN  --  2.5* 3.6  --  3.5   < > 2.8*     --   --  300*  --   --  202    < > = values in this interval not displayed.     TSH   Date Value Ref Range Status   12/06/2022 1.60 0.30 - 4.20 uIU/mL Final   05/24/2022 2.23 0.40 - 4.00 mU/L Final   06/16/2021 1.43 0.40 - 4.00 mU/L Final   09/24/2020 1.59 0.40 - 4.00 mU/L Final   06/12/2020 1.49 0.40 - 4.00 mU/L Final     No results for input(s): CEA in the last 89413 hours.  Results for orders placed or performed during the hospital encounter of 01/28/23   XR Chest Port 1 View    Narrative    EXAM: XR CHEST PORT 1 VIEW  LOCATION: [facility]  DATE/TIME: 1/28/2023 11:57 PM    INDICATION: Sepsis.  COMPARISON: 12/06/2022      Impression    IMPRESSION:     No focal airspace disease. No pleural effusion or pneumothorax.    The cardiomediastinal silhouette is unremarkable. Aortic calcifications.   CT Head w/o Contrast    Narrative    EXAM: CT HEAD W/O CONTRAST  LOCATION: [facility]  DATE/TIME: 1/29/2023 12:33 AM    INDICATION: Delirium and unresponsiveness  COMPARISON:  CT head 09/10/2019.  TECHNIQUE: Routine CT Head without IV contrast.  Multiplanar reformats. Dose reduction techniques were used.    FINDINGS:  INTRACRANIAL CONTENTS: No intracranial hemorrhage, extraaxial collection, or mass effect.  No CT evidence of acute infarct. Mild presumed chronic small vessel ischemic changes. Mild generalized volume loss. No hydrocephalus.     VISUALIZED ORBITS/SINUSES/MASTOIDS: No intraorbital abnormality. No significant paranasal sinus mucosal disease. No middle ear or mastoid effusion.    BONES/SOFT TISSUES: No acute abnormality.      Impression    IMPRESSION:  1.  No CT evidence for acute intracranial process.  2.  Brain atrophy and presumed chronic microvascular ischemic changes as above.   CT Chest Abdomen Pelvis w/o Contrast    Narrative    EXAM: CT CHEST ABDOMEN PELVIS W/O CONTRAST  LOCATION: [facility]  DATE/TIME: 1/29/2023 12:32 AM    INDICATION: Sepsis.  COMPARISON: Chest CT from 7/21/2022 and 5/24/2022.  TECHNIQUE: CT scan of the chest, abdomen, and pelvis was performed without IV contrast. Multiplanar reformats were obtained. Dose reduction techniques were used.   CONTRAST: None.    FINDINGS:   LUNGS AND PLEURA: Patchy mostly linear opacities left lower lobe inferiorly favored to be due to some chronic scarring and/or atelectasis and similar to previous. Additional scattered areas of mild subpleural scarring or atelectasis elsewhere in the   periphery of the lungs. No definite acute appearing infiltrates or consolidation. Central airways are clear. Small chronic appearing left pleural effusion is unchanged.    MEDIASTINUM/AXILLAE: Small amount of fluid adjacent to the distal esophagus of uncertain significance. Small hiatal hernia. Scattered small posterior mediastinal lymph nodes probably reactive in nature. Normal heart size. No pericardial effusion. Normal   caliber thoracic aorta. Axillary regions are unremarkable.    CORONARY ARTERY CALCIFICATION: Mild.    HEPATOBILIARY: Liver is negative. Cholelithiasis. No  biliary dilatation.    PANCREAS: Normal.    SPLEEN: Normal.    ADRENAL GLANDS: 3 cm right adrenal nodule is technically indeterminate on this study but unchanged compared to 05/24/2022 and compatible with an adrenal adenoma based off an older MRI scan by report. Left adrenal gland is negative.    KIDNEYS/BLADDER: Normal.    BOWEL: Bowel is normal in caliber with no evidence for obstruction. Question some mild edema seen around the second and third portions of the duodenum, nonspecific. Normal appendix. Scattered colonic diverticuli without evidence for diverticulitis. No   acute bowel findings.    LYMPH NODES: Normal.    VASCULATURE: Unremarkable.    PELVIC ORGANS: Normal.    MUSCULOSKELETAL: Mild to moderate degenerative changes throughout the spine. Right TAWNY. Moderate size fat-containing umbilical hernia.      Impression    IMPRESSION:  1.  Areas of mostly linear scarring and/or atelectasis in the lungs greatest in the left lung base. Nothing definite for acute pneumonitis.    2.  Stable chronic appearing small left pleural effusion.    3.  Small amount of fluid seen adjacent to the distal thoracic esophagus of uncertain significance. Scattered nonspecific posterior mediastinal lymph nodes along the course of the esophagus. These findings are of uncertain significance or etiology.   Correlation for any signs of esophagitis or other inflammatory process.    4.  Cholelithiasis. No biliary dilatation.    5.  Question small amount of edema seen around the second and third portions of the duodenum. Clinical correlation to exclude any signs of a duodenal inflammatory process. Correlation with pancreatic enzymes also suggested to exclude pancreatitis.    6.  3 cm right adrenal nodule is technically indeterminate on this study but unchanged from prior CT scan and reportedly represents a benign adrenal adenoma based off a prior MRI.    7.  Scattered colonic diverticuli without evidence for diverticulitis. Normal appendix.    Echocardiogram Complete     Value    LVEF  55-60%    [facility]    [id number]  [id number]  [id number]  [id number]^MAAME^FEDERICO^OMARI     [facility]  Echocardiography Laboratory  [address] [facility] [place] [address]     Name: XAVIER ROWELL  MRN: [id number]  : 1960  Study Date: 2023 10:46 AM  Age: 62 yrs  Gender: Female  Patient Location: [facility]  Reason For Study: CHF  Ordering Physician: FEDERICO ISABEL  Referring Physician: Anjel Busby  Performed By: Emma Foster     BSA: 2.3 m2  Height: 66 in  Weight: 284 lb  HR: 104  BP: 132/72 mmHg  ______________________________________________________________________________  Procedure  Complete Portable Echo Adult. Optison (NDC #[id number]) given intravenously.  ______________________________________________________________________________  Interpretation Summary     There is mild to moderate concentric left ventricular hypertrophy.  Left ventricular systolic function is normal.  The right ventricle is mildly dilated.  Mildly decreased right ventricular systolic function  IVC diameter >2.1 cm collapsing <50% with sniff suggests a high RA pressure  estimated at 15 mmHg or greater.  Aortic valve not well seen but it is abnormal. Likely trileaflet with  sclerosis/stenosis  Mild to moderate valvular aortic stenosis.  No aortic regurgitation is present.  ABNORMAL FINDING- the ascending aorta at sinotubular ridge is not well seen  but there is eiither artifact vs possible dissection vs vegetation/flap. REC-  LETA if possible or at least CT aortogram (this echo report called to silver)  NSR with frequent ectopy  Technically difficult, suboptimal study.  ______________________________________________________________________________  Left Ventricle  The left ventricle is normal in size. There is mild to moderate concentric  left ventricular hypertrophy. Left ventricular systolic function is normal.  The visual ejection fraction is 55-60%. Left  ventricular diastolic function is  indeterminate. Normal left ventricular wall motion.     Right Ventricle  The right ventricle is mildly dilated. Mildly decreased right ventricular  systolic function.     Atria  The left atrium is borderline dilated. Right atrial size is normal.     Mitral Valve  There is moderate mitral annular calcification.     Tricuspid Valve  No tricuspid regurgitation. Right ventricular systolic pressure could not be  approximated due to inadequate tricuspid regurgitation. IVC diameter >2.1 cm  collapsing <50% with sniff suggests a high RA pressure estimated at 15 mmHg or  greater.     Aortic Valve  The aortic valve is not well visualized. Aortic valve not well seen but it is  abnormal. Likely trileaflet with sclerosis/stenosis. No aortic regurgitation  is present. Mild to moderate valvular aortic stenosis.     Pulmonic Valve  The pulmonic valve is not well seen, but is grossly normal.     Vessels  The aortic root is not well visualized. ABNORMAL FINDING- the ascending aorta  at sinotubular ridge is not well seen but there is eiither artifact vs  possible dissection vs vegetation/flap. REC- LETA if possible or at least CT  aortogram (this echo report called to silver).     Pericardium  The pericardium appears normal.     Rhythm  Sinus rhythm was noted. NSR with frequent ectopy.  ______________________________________________________________________________  MMode/2D Measurements & Calculations  IVSd: 1.5 cm     LVIDd: 4.9 cm  LVIDs: 3.2 cm  LVPWd: 1.3 cm  FS: 33.9 %  LV mass(C)d: 275.2 grams  LV mass(C)dI: 118.5 grams/m2  Ao root diam: 3.7 cm  LA dimension: 4.5 cm  asc Aorta Diam: 3.3 cm  LA/Ao: 1.2  LVOT diam: 2.4 cm  LVOT area: 4.5 cm2  LA Volume (BP): 70.3 ml  LA Volume Index (BP): 30.3 ml/m2  RWT: 0.53     Doppler Measurements & Calculations  MV E max chris: 77.3 cm/sec  MV A max chris: 99.7 cm/sec  MV E/A: 0.78  MV dec slope: 383.5 cm/sec2  MV dec time: 0.21 sec  Ao V2 max: 333.0 cm/sec  Ao max  P.4 mmHg  Ao V2 mean: 236.0 cm/sec  Ao mean P.0 mmHg  Ao V2 VTI: 59.4 cm  MERON(I,D): 1.5 cm2  MERON(V,D): 1.3 cm2  LV V1 max PG: 3.8 mmHg  LV V1 max: 97.7 cm/sec  LV V1 VTI: 20.3 cm  SV(LVOT): 91.8 ml  SI(LVOT): 39.6 ml/m2  PA acc time: 0.10 sec  TR max emile: 250.0 cm/sec  TR max P.0 mmHg  AV Emile Ratio (DI): 0.29  MERON Index (cm2/m2): 0.67  E/E' av.5  Lateral E/e': 11.8  Medial E/e': 11.1     ______________________________________________________________________________  Report approved by: Tariq Doty 2023 01:25 PM         Transesophageal Echocardiogram     Value    LVEF  60-65%    Narrative    [id number]  [id number]  [id number]  [id number]^SURESH^TAIWO^DASHA     [facility]  Echocardiography Laboratory  [address] [place] [address]     Name: XAVIER ROWELL  MRN: [id number]  : 1960  Study Date: 2023 02:57 PM  Age: 62 yrs  Gender: Female  Patient Location: [facility]  Reason For Study: Endocarditis  Ordering Physician: TAIWO NESS  Referring Physician: TAIWO NESS  Performed By: KEATON Pinto     BSA: 2.3 m2  Height: 66 in  Weight: 284 lb  HR: 122  BP: 141/63 mmHg  ______________________________________________________________________________  Procedure  Complete LETA Adult. 3D image acquisition, reconstruction, and real-time  interpretation was performed.  ______________________________________________________________________________  Interpretation Summary     1. No intracardiac mass or thrombus.  2. Normal left ventricular size and systolic function. LVEF 60-65%.  3. Normal right ventricular size and systolic function.  4. Trileaflet aortic valve sclerosis with trace regurgitation, no stenosis.  5. No evidence of aortic valve vegetation or aortic dissection, noted on  yesterday's transthoracic echocardiogram.     Findings discussed with Dr. Ness.     ______________________________________________________________________________  LETA  Versed  (3mg) was given intravenously. Fentanyl (100mcg) was given  intravenously. I determined this patient to be an appropriate candidate for  the planned sedation and procedure and have reassessed the patient immediately  prior to sedation and procedure. Total sedation time: 12 minutes of continuous  bedside 1:1 monitoring. Prior to the exam, the oral cavity was checked and no  overcrowding was noted. Consent to the procedure was obtained prior to  sedation. The transesophageal probe was passed without difficulty. There were  no complications associated with this procedure.     Left Ventricle  The left ventricle is normal in size. Left ventricular systolic function is  normal. The visual ejection fraction is 60-65%. No regional wall motion  abnormalities noted. There is no thrombus seen in the left ventricle.     Right Ventricle  The right ventricle is normal size. The right ventricular systolic function is  normal. There is no mass or thrombus in the right ventricle.     Atria  Normal left atrial size. No left atrial mass or thrombus visualized. Right  atrial size is normal. No evidence of right atrial mass/thrombus. Intact  atrial septum. There is no color Doppler evidence of an atrial shunt. The left  atrial appendage was well visualized and free of thrombus.     Mitral Valve  The mitral valve leaflets appear normal. There is no evidence of stenosis,  fluttering, or prolapse. There is trace mitral regurgitation. There is no  mitral valve stenosis.     Tricuspid Valve  Normal tricuspid valve. There is trace tricuspid regurgitation.     Aortic Valve  The aortic valve is trileaflet with aortic valve sclerosis. There is moderate  aortic sclerosis of the left coronary cusp. There is no vegetation on the  aortic valve. There is trace aortic regurgitation. No hemodynamically  significant valvular aortic stenosis.     Pulmonic Valve  Normal pulmonic valve. There is trace pulmonic valvular regurgitation.     Vessels  The  aortic root is normal size. Normal size ascending aorta. Normal ascending,  transverse (arch), and descending aorta. Normal pulmonary venous drainage.     Pericardial/Pleural  There is no pericardial effusion.     Rhythm  Sinus rhythm was noted.  ______________________________________________________________________________  Report approved by: Dr Mazin Bourgeois 01/31/2023 04:47 PM     ______________________________________________________________________________        *Note: Due to a large number of results and/or encounters for the requested time period, some results have not been displayed. A complete set of results can be found in Results Review.

## 2023-02-03 NOTE — ANESTHESIA PREPROCEDURE EVALUATION
Anesthesia Pre-Procedure Evaluation    Patient: Shira Rodriguez   MRN: [id number] : 1960        Procedure : Procedure(s):  bone marrow biopsy          Past Medical History:   Diagnosis Date     Acute massive pulmonary embolism (H) 2019     Cardiac arrest (H) 2019    Due to PE; required ECMO     Chronic bronchitis (H) 2015     Chronic pain      Contact dermatitis      COPD (chronic obstructive pulmonary disease) (H)      Depressive disorder 1985     Eczema     HANDS     Elevated liver enzymes      H/O total knee replacement, left      History of total hip replacement 10/27/2011     Hyperlipidemia      Hypertension      Morbid obesity (H)      Osteoarthrosis     right knee     Paroxysmal atrial fibrillation (H)      RA (rheumatoid arthritis) (H) 2018     Sleep apnea      Tobacco use disorder      Varicella 1965      Past Surgical History:   Procedure Laterality Date     ARTHROPLASTY KNEE  2012    Procedure: ARTHROPLASTY KNEE;  Left Total Knee Arthroplasty;  Surgeon: Annette Quesada MD;  Location: [facility]     ARTHROSCOPY HIP Right      BRONCHOSCOPY FLEXIBLE AND RIGID N/A 2019    Procedure: Bronchoscopy flexible;  Surgeon: Patrick Rayo MD;  Location: [facility]     COLONOSCOPY N/A 2021    Procedure: COLONOSCOPY, WITH POLYPECTOMY AND BIOPSY;  Surgeon: Diogo Lynch MD;  Location: [facility]     CV EXTRACORPERAL MEMBRANE OXYGENATION N/A 2019    Procedure: Extracorporeal Membrance Oxygenation;  Surgeon: Kaleb Mcfarlane MD;  [facility]     INSERT EXTRACORPORAL MEMBRANE OXYGENATOR N/A 2019    Procedure: Venous-Venous cannulation using ultrasound guidance and transesophageal echocardiogram, venous-arterial ecmo decannulation and repair of left femoral artery;  Surgeon: Patrick Rayo MD;  [facility]     [facility]  9/10/2019     IR PULMONARY ANGIOGRAM BILATERAL  9/10/2019     IR THROMBOLYSIS ARTERIAL  INFUSION INITIAL DAY  9/10/2019     THORACENTESIS N/A 1/16/2020    Procedure: THORACENTESIS;  Surgeon: Georgina Richardson MD;  Location: [facility] TOTAL HIP ARTHROPLASTY  07/09/04    RT      Allergies   Allergen Reactions     Adhesive Tape Blisters     Erythromycin Rash      Social History     Tobacco Use     Smoking status: Former     Packs/day: 0.00     Years: 33.00     Pack years: 0.00     Types: Cigarettes     Start date: 1/1/1982     Quit date: 9/9/2019     Years since quitting: 3.4     Smokeless tobacco: Never   Substance Use Topics     Alcohol use: Yes     Comment: Rarely      Wt Readings from Last 1 Encounters:   02/03/23 139.3 kg (307 lb)        Anesthesia Evaluation   Pt has had prior anesthetic.     History of anesthetic complications       ROS/MED HX  ENT/Pulmonary: Comment: Hx of acute massive pe in 2019 with associated cardiac arrest and ecmo    (+) sleep apnea, uses CPAP, mild,  COPD,     Neurologic:       Cardiovascular: Comment: Procedure  Complete LETA Adult. 3D image acquisition, reconstruction, and real-time  interpretation was performed.  ______________________________________________________________________________  Interpretation Summary     1. No intracardiac mass or thrombus.  2. Normal left ventricular size and systolic function. LVEF 60-65%.  3. Normal right ventricular size and systolic function.  4. Trileaflet aortic valve sclerosis with trace regurgitation, no stenosis.  5. No evidence of aortic valve vegetation or aortic dissection, noted on  yesterday's transthoracic echocardiogram.     Findings discussed with Dr. Ness.     ______________________________________________________________________________  LETA  Versed (3mg) was given intravenously. Fentanyl (100mcg) was given  intravenously. I determined this patient to be an appropriate candidate for  the planned sedation and procedure and have reassessed the patient immediately  prior to sedation and procedure. Total sedation time:  12 minutes of continuous  bedside 1:1 monitoring. Prior to the exam, the oral cavity was checked and no  overcrowding was noted. Consent to the procedure was obtained prior to  sedation. The transesophageal probe was passed without difficulty. There were  no complications associated with this procedure.     Left Ventricle  The left ventricle is normal in size. Left ventricular systolic function is  normal. The visual ejection fraction is 60-65%. No regional wall motion  abnormalities noted. There is no thrombus seen in the left ventricle.     Right Ventricle  The right ventricle is normal size. The right ventricular systolic function is  normal. There is no mass or thrombus in the right ventricle.     Atria  Normal left atrial size. No left atrial mass or thrombus visualized. Right  atrial size is normal. No evidence of right atrial mass/thrombus. Intact  atrial septum. There is no color Doppler evidence of an atrial shunt. The left  atrial appendage was well visualized and free of thrombus.     Mitral Valve  The mitral valve leaflets appear normal. There is no evidence of stenosis,  fluttering, or prolapse. There is trace mitral regurgitation. There is no  mitral valve stenosis.     Tricuspid Valve  Normal tricuspid valve. There is trace tricuspid regurgitation.     Aortic Valve  The aortic valve is trileaflet with aortic valve sclerosis. There is moderate  aortic sclerosis of the left coronary cusp. There is no vegetation on the  aortic valve. There is trace aortic regurgitation. No hemodynamically  significant valvular aortic stenosis.     Pulmonic Valve  Normal pulmonic valve. There is trace pulmonic valvular regurgitation.     Vessels  The aortic root is normal size. Normal size ascending aorta. Normal ascending,  transverse (arch), and descending aorta. Normal pulmonary venous drainage.     Pericardial/Pleural  There is no pericardial effusion.     Rhythm  Sinus rhythm was noted.    (+)  hypertension-----dysrhythmias (hx of pea arrest),     METS/Exercise Tolerance:     Hematologic: Comments: Plat 21, INR 2.68    (+) anemia,     Musculoskeletal:       GI/Hepatic:    (-) GERD   Renal/Genitourinary:     (+) renal disease, type: ARF,     Endo:     (+) Obesity,     Psychiatric/Substance Use:       Infectious Disease:       Malignancy:       Other: Comment: Sepsis  Neutropenic fever  pancytopenia           Physical Exam    Airway        Mallampati: III   TM distance: > 3 FB   Neck ROM: full   Mouth opening: > 3 cm    Respiratory Devices and Support         Dental           Cardiovascular   cardiovascular exam normal          Pulmonary   pulmonary exam normal                OUTSIDE LABS:  CBC:   Lab Results   Component Value Date    WBC 0.3 (LL) 02/03/2023    WBC 0.4 (LL) 02/02/2023    HGB 7.1 (L) 02/03/2023    HGB 7.2 (L) 02/02/2023    HCT 22.4 (L) 02/03/2023    HCT 22.4 (L) 02/02/2023    PLT 21 (LL) 02/03/2023    PLT 39 (LL) 02/02/2023     BMP:   Lab Results   Component Value Date     02/03/2023     02/02/2023    POTASSIUM 3.0 (L) 02/03/2023    POTASSIUM 3.3 (L) 02/02/2023    CHLORIDE 110 (H) 02/03/2023    CHLORIDE 107 02/02/2023    CO2 25 02/03/2023    CO2 24 02/02/2023    BUN 42.4 (H) 02/03/2023    BUN 41.4 (H) 02/02/2023    CR 1.83 (H) 02/03/2023    CR 1.79 (H) 02/02/2023    GLC 96 02/03/2023    GLC 99 02/02/2023     COAGS:   Lab Results   Component Value Date    PTT 42 (H) 01/29/2023    INR 2.58 (H) 02/03/2023    FIBR 932 (H) 01/29/2023     POC:   Lab Results   Component Value Date    BGM 73 09/25/2019     HEPATIC:   Lab Results   Component Value Date    ALBUMIN 2.5 (L) 01/28/2023    PROTTOTAL 6.1 (L) 01/28/2023    ALT 13 01/28/2023    AST 28 01/28/2023    ALKPHOS 57 01/28/2023    BILITOTAL 0.7 01/28/2023     OTHER:   Lab Results   Component Value Date    PH 7.25 (L) 01/28/2023    LACT 0.6 (L) 01/30/2023    A1C 5.3 05/24/2022    MARIA INES 7.9 (L) 02/03/2023    PHOS 4.2 10/04/2019    MAG 2.0  02/01/2023    LIPASE 10 (L) 01/29/2023    TSH 1.60 12/06/2022    T4 1.07 09/24/2020    T3 132 09/29/2019    CRP 6.8 10/06/2021    SED 15 01/13/2021       Anesthesia Plan    ASA Status:  3      Anesthesia Type: MAC.   Induction: Intravenous.           Consents    Anesthesia Plan(s) and associated risks, benefits, and realistic alternatives discussed. Questions answered and patient/representative(s) expressed understanding.    - Discussed:     - Discussed with:  Patient         Postoperative Care            Comments:                Kyra Prieto

## 2023-02-03 NOTE — PROVIDER NOTIFICATION
MD Notification    Notified Person: MD    Notified Person Name:  Cam    Notification Date/Time: 2/3/23 0810    Notification Interaction: Preeti    Purpose of Notification: Regular diet was ordered post bone marrow biopsy. She was previously on a minced and moist. Please clarify what her current diet should be. Thanks! Mindi CORRAL    Orders Received: Resume minced and moist diet as previously ordered    Comments:

## 2023-02-03 NOTE — PLAN OF CARE
Goal Outcome Evaluation: 1930-0700    A&O x4, flat affect at times. Now NPO. Bedbound, turned and repo'd q2. Wound cares done under both breasts, Pannus, perineum and behind right knee. Skin is macerated and reddened, severely painful to touch even with premedicating with IV Dilaudid.  Oxycodone given an hour after for the pain in addition to mouth pain. R PIV infusing 1/2 NS w/ sodium bicarb @ 50 ml/hr. Purewick in place with adequate output. Congested cough.    Bone marrow scheduled today @ 1230. Repeat CBC this am, per gyn-onc want to keep >50.

## 2023-02-03 NOTE — ANESTHESIA POSTPROCEDURE EVALUATION
Patient: Shira Rodriguez    Procedure: Procedure(s):  bone marrow biopsy       Anesthesia Type:  MAC    Note:  Disposition: Inpatient   Postop Pain Control: Uneventful            Sign Out: Well controlled pain   PONV: No   Neuro/Psych: Uneventful            Sign Out: Acceptable/Baseline neuro status   Airway/Respiratory: Uneventful            Sign Out: Acceptable/Baseline resp. status   CV/Hemodynamics: Uneventful            Sign Out: Acceptable CV status; No obvious hypovolemia; No obvious fluid overload   Other NRE: NONE   DID A NON-ROUTINE EVENT OCCUR?            Last vitals:  Vitals Value Taken Time   /74 02/03/23 1340   Temp     Pulse 108 02/03/23 1343   Resp 16 02/03/23 1340   SpO2 95 % 02/03/23 1343   Vitals shown include unvalidated device data.    Electronically Signed By: Kyra Prieto  February 3, 2023  1:53 PM

## 2023-02-03 NOTE — OR NURSING
A left iliac crest bone marrow biopsy.  The charge nurse is informed to check the biopsy site more frequently due to her low platelet level.

## 2023-02-04 ENCOUNTER — APPOINTMENT (OUTPATIENT)
Dept: CT IMAGING | Facility: CLINIC | Age: 63
DRG: 871 | End: 2023-02-04
Attending: SPECIALIST
Payer: COMMERCIAL

## 2023-02-04 LAB
ANION GAP SERPL CALCULATED.3IONS-SCNC: 11 MMOL/L (ref 7–15)
BLD PROD TYP BPU: NORMAL
BLOOD COMPONENT TYPE: NORMAL
BUN SERPL-MCNC: 44.5 MG/DL (ref 8–23)
CALCIUM SERPL-MCNC: 8.5 MG/DL (ref 8.8–10.2)
CHLORIDE SERPL-SCNC: 108 MMOL/L (ref 98–107)
CODING SYSTEM: NORMAL
CREAT SERPL-MCNC: 1.94 MG/DL (ref 0.51–0.95)
CROSSMATCH: NORMAL
DEPRECATED HCO3 PLAS-SCNC: 25 MMOL/L (ref 22–29)
ERYTHROCYTE [DISTWIDTH] IN BLOOD BY AUTOMATED COUNT: 14.2 % (ref 10–15)
GFR SERPL CREATININE-BSD FRML MDRD: 29 ML/MIN/1.73M2
GLUCOSE SERPL-MCNC: 77 MG/DL (ref 70–99)
HCT VFR BLD AUTO: 20.5 % (ref 35–47)
HGB BLD-MCNC: 6.7 G/DL (ref 11.7–15.7)
INR PPP: 2.2 (ref 0.85–1.15)
ISSUE DATE AND TIME: NORMAL
MCH RBC QN AUTO: 28.6 PG (ref 26.5–33)
MCHC RBC AUTO-ENTMCNC: 32.7 G/DL (ref 31.5–36.5)
MCV RBC AUTO: 88 FL (ref 78–100)
MRSA DNA SPEC QL NAA+PROBE: NEGATIVE
PLATELET # BLD AUTO: 14 10E3/UL (ref 150–450)
PLATELET # BLD AUTO: 18 10E3/UL (ref 150–450)
POTASSIUM SERPL-SCNC: 3.2 MMOL/L (ref 3.4–5.3)
POTASSIUM SERPL-SCNC: 3.7 MMOL/L (ref 3.4–5.3)
RBC # BLD AUTO: 2.34 10E6/UL (ref 3.8–5.2)
SA TARGET DNA: NEGATIVE
SODIUM SERPL-SCNC: 144 MMOL/L (ref 136–145)
UNIT ABO/RH: NORMAL
UNIT NUMBER: NORMAL
UNIT STATUS: NORMAL
UNIT TYPE ISBT: 6200
WBC # BLD AUTO: 0.3 10E3/UL (ref 4–11)

## 2023-02-04 PROCEDURE — 71250 CT THORAX DX C-: CPT

## 2023-02-04 PROCEDURE — 99233 SBSQ HOSP IP/OBS HIGH 50: CPT | Performed by: INTERNAL MEDICINE

## 2023-02-04 PROCEDURE — 85018 HEMOGLOBIN: CPT | Performed by: HOSPITALIST

## 2023-02-04 PROCEDURE — P9037 PLATE PHERES LEUKOREDU IRRAD: HCPCS | Performed by: HOSPITALIST

## 2023-02-04 PROCEDURE — 250N000013 HC RX MED GY IP 250 OP 250 PS 637: Performed by: INTERNAL MEDICINE

## 2023-02-04 PROCEDURE — 85610 PROTHROMBIN TIME: CPT | Performed by: HOSPITALIST

## 2023-02-04 PROCEDURE — 250N000011 HC RX IP 250 OP 636: Performed by: INTERNAL MEDICINE

## 2023-02-04 PROCEDURE — 87305 ASPERGILLUS AG IA: CPT | Performed by: SPECIALIST

## 2023-02-04 PROCEDURE — 85049 AUTOMATED PLATELET COUNT: CPT | Performed by: HOSPITALIST

## 2023-02-04 PROCEDURE — P9016 RBC LEUKOCYTES REDUCED: HCPCS | Performed by: HOSPITALIST

## 2023-02-04 PROCEDURE — P9035 PLATELET PHERES LEUKOREDUCED: HCPCS

## 2023-02-04 PROCEDURE — C9113 INJ PANTOPRAZOLE SODIUM, VIA: HCPCS | Performed by: HOSPITALIST

## 2023-02-04 PROCEDURE — 36415 COLL VENOUS BLD VENIPUNCTURE: CPT | Performed by: HOSPITALIST

## 2023-02-04 PROCEDURE — 250N000011 HC RX IP 250 OP 636: Performed by: HOSPITALIST

## 2023-02-04 PROCEDURE — 84132 ASSAY OF SERUM POTASSIUM: CPT | Performed by: HOSPITALIST

## 2023-02-04 PROCEDURE — 80048 BASIC METABOLIC PNL TOTAL CA: CPT | Performed by: HOSPITALIST

## 2023-02-04 PROCEDURE — 250N000013 HC RX MED GY IP 250 OP 250 PS 637: Performed by: SPECIALIST

## 2023-02-04 PROCEDURE — 36415 COLL VENOUS BLD VENIPUNCTURE: CPT | Performed by: SPECIALIST

## 2023-02-04 PROCEDURE — 87641 MR-STAPH DNA AMP PROBE: CPT | Performed by: SPECIALIST

## 2023-02-04 PROCEDURE — 250N000013 HC RX MED GY IP 250 OP 250 PS 637: Performed by: HOSPITALIST

## 2023-02-04 PROCEDURE — 99233 SBSQ HOSP IP/OBS HIGH 50: CPT | Performed by: SPECIALIST

## 2023-02-04 PROCEDURE — 120N000001 HC R&B MED SURG/OB

## 2023-02-04 PROCEDURE — 250N000013 HC RX MED GY IP 250 OP 250 PS 637: Performed by: NURSE PRACTITIONER

## 2023-02-04 PROCEDURE — 99233 SBSQ HOSP IP/OBS HIGH 50: CPT | Performed by: HOSPITALIST

## 2023-02-04 RX ORDER — MULTIPLE VITAMINS W/ MINERALS TAB 9MG-400MCG
1 TAB ORAL DAILY
Status: DISCONTINUED | OUTPATIENT
Start: 2023-02-04 | End: 2023-02-21 | Stop reason: HOSPADM

## 2023-02-04 RX ORDER — POLYETHYLENE GLYCOL 3350 17 G/17G
17 POWDER, FOR SOLUTION ORAL DAILY PRN
Status: DISCONTINUED | OUTPATIENT
Start: 2023-02-04 | End: 2023-02-21 | Stop reason: HOSPADM

## 2023-02-04 RX ORDER — HYDROMORPHONE HCL IN WATER/PF 6 MG/30 ML
0.2 PATIENT CONTROLLED ANALGESIA SYRINGE INTRAVENOUS EVERY 6 HOURS PRN
Status: COMPLETED | OUTPATIENT
Start: 2023-02-04 | End: 2023-02-07

## 2023-02-04 RX ORDER — POTASSIUM CHLORIDE 1.5 G/1.58G
40 POWDER, FOR SOLUTION ORAL ONCE
Status: COMPLETED | OUTPATIENT
Start: 2023-02-04 | End: 2023-02-04

## 2023-02-04 RX ADMIN — MICONAZOLE NITRATE: 2 POWDER TOPICAL at 08:59

## 2023-02-04 RX ADMIN — PANTOPRAZOLE SODIUM 40 MG: 40 INJECTION, POWDER, FOR SOLUTION INTRAVENOUS at 05:55

## 2023-02-04 RX ADMIN — OXYCODONE HYDROCHLORIDE 2.5 MG: 5 TABLET ORAL at 09:51

## 2023-02-04 RX ADMIN — GABAPENTIN 100 MG: 100 CAPSULE ORAL at 19:51

## 2023-02-04 RX ADMIN — HYDROMORPHONE HYDROCHLORIDE 0.2 MG: 0.2 INJECTION, SOLUTION INTRAMUSCULAR; INTRAVENOUS; SUBCUTANEOUS at 22:14

## 2023-02-04 RX ADMIN — LEUCOVORIN CALCIUM 245 MG: 350 INJECTION, POWDER, LYOPHILIZED, FOR SOLUTION INTRAMUSCULAR; INTRAVENOUS at 10:52

## 2023-02-04 RX ADMIN — LEUCOVORIN CALCIUM 245 MG: 350 INJECTION, POWDER, LYOPHILIZED, FOR SOLUTION INTRAMUSCULAR; INTRAVENOUS at 04:00

## 2023-02-04 RX ADMIN — NYSTATIN 500000 UNITS: 100000 SUSPENSION ORAL at 22:13

## 2023-02-04 RX ADMIN — PANTOPRAZOLE SODIUM 40 MG: 40 INJECTION, POWDER, FOR SOLUTION INTRAVENOUS at 18:05

## 2023-02-04 RX ADMIN — CYANOCOBALAMIN 1000 MCG: 1000 INJECTION, SOLUTION INTRAMUSCULAR at 08:56

## 2023-02-04 RX ADMIN — GABAPENTIN 100 MG: 100 CAPSULE ORAL at 08:56

## 2023-02-04 RX ADMIN — NYSTATIN 500000 UNITS: 100000 SUSPENSION ORAL at 18:05

## 2023-02-04 RX ADMIN — MULTIPLE VITAMINS W/ MINERALS TAB 1 TABLET: TAB at 15:24

## 2023-02-04 RX ADMIN — OXYCODONE HYDROCHLORIDE 2.5 MG: 5 TABLET ORAL at 22:56

## 2023-02-04 RX ADMIN — FLUCONAZOLE 200 MG: 100 TABLET ORAL at 08:56

## 2023-02-04 RX ADMIN — ARIPIPRAZOLE 5 MG: 5 TABLET ORAL at 08:56

## 2023-02-04 RX ADMIN — HYDROMORPHONE HYDROCHLORIDE 0.2 MG: 0.2 INJECTION, SOLUTION INTRAMUSCULAR; INTRAVENOUS; SUBCUTANEOUS at 16:11

## 2023-02-04 RX ADMIN — GABAPENTIN 100 MG: 100 CAPSULE ORAL at 13:22

## 2023-02-04 RX ADMIN — MICONAZOLE NITRATE: 2 POWDER TOPICAL at 22:17

## 2023-02-04 RX ADMIN — DULOXETINE HYDROCHLORIDE 120 MG: 60 CAPSULE, DELAYED RELEASE ORAL at 08:56

## 2023-02-04 RX ADMIN — OXYCODONE HYDROCHLORIDE 2.5 MG: 5 TABLET ORAL at 18:05

## 2023-02-04 RX ADMIN — FILGRASTIM 480 MCG: 480 INJECTION, SOLUTION INTRAVENOUS; SUBCUTANEOUS at 19:51

## 2023-02-04 RX ADMIN — DIPHENHYDRAMINE HYDROCHLORIDE AND LIDOCAINE HYDROCHLORIDE AND ALUMINUM HYDROXIDE AND MAGNESIUM HYDRO 10 ML: KIT at 22:13

## 2023-02-04 RX ADMIN — NYSTATIN 500000 UNITS: 100000 SUSPENSION ORAL at 13:22

## 2023-02-04 RX ADMIN — CEFEPIME HYDROCHLORIDE 2 G: 2 INJECTION, POWDER, FOR SOLUTION INTRAVENOUS at 23:54

## 2023-02-04 RX ADMIN — ATORVASTATIN CALCIUM 40 MG: 40 TABLET, FILM COATED ORAL at 19:51

## 2023-02-04 RX ADMIN — POTASSIUM CHLORIDE 40 MEQ: 1.5 POWDER, FOR SOLUTION ORAL at 10:52

## 2023-02-04 RX ADMIN — CEFEPIME HYDROCHLORIDE 2 G: 2 INJECTION, POWDER, FOR SOLUTION INTRAVENOUS at 14:05

## 2023-02-04 RX ADMIN — NYSTATIN 500000 UNITS: 100000 SUSPENSION ORAL at 08:55

## 2023-02-04 RX ADMIN — FOLIC ACID 2 MG: 1 TABLET ORAL at 08:56

## 2023-02-04 RX ADMIN — LEUCOVORIN CALCIUM 245 MG: 350 INJECTION, POWDER, LYOPHILIZED, FOR SOLUTION INTRAMUSCULAR; INTRAVENOUS at 22:17

## 2023-02-04 RX ADMIN — LEUCOVORIN CALCIUM 245 MG: 350 INJECTION, POWDER, LYOPHILIZED, FOR SOLUTION INTRAMUSCULAR; INTRAVENOUS at 16:22

## 2023-02-04 ASSESSMENT — ACTIVITIES OF DAILY LIVING (ADL)
ADLS_ACUITY_SCORE: 52

## 2023-02-04 NOTE — PROVIDER NOTIFICATION
MD Notification    Notified Person: MD    Notified Person Name: Dr. Levy    Notification Date/Time: 2/4/2023 0820    Notification Interaction: Voclino    Purpose of Notification: FYI, critical hemoglobin value of 6.7     Orders Received:    Comments:

## 2023-02-04 NOTE — PLAN OF CARE
A&Ox4. VSS on RA. R PIV SL. Pain managed with PRN oxycodone x1. Wound cares performed per [facility] orders, pre-medicated with IV dilaudid. Bone marrow biopsy performed this shift, dressing over L iliac crest site CDI. CXR performed, results pending. Platelets 21, transfused 1 unit of platelets. Potassium 3.0, MD paged to order potassium protocol. New area of redness and pitting edema noted on anterior aspect of left foot, marked and will continue to monitor. Purewick in place with adequate UOP. T/R Q2H when pt on unit. A2/lift. Discharge pending improvement and plan.

## 2023-02-04 NOTE — PROGRESS NOTES
[facility]    Infectious Disease Progress Note    Date of Service : 02/04/2023      Assessment:  62 YF with RA on Methotrexate (recently transitioned from subcutaneous dosing to PO), morbid obesity , hx of PEA cardiac arrest due to PE who has been hospitalized with progressive fatigue, was found down and is noted to have severe pancytopenia as well as lactic acidosis related to neutropenic fever. She has significant candida intertrigo infection in the inframammary and groin folds. No bacteremia or other focus of infection found thus far except for mild periumbilical cellulitis. She had severe B12/folate deficiency which has been corrected, however, pancytopenia remains persistent and severe. Bone marrow biopsy is planned     -Pancytopenia ? Methotrexate toxicity vs other hematological disorder  -Neutropenic fever resolved  -B12/folate deficiency  -Abnormal TTE with concern for vegetation vs dissection, LETA is planned  -Mild periumbilical cellulitis and left ankle erythema  -Fungal intertrigo infection in inframammary and groin skin folds  -KRISS   -Lactic acidosis  -Rheumatoid arthritis - on Methotrexate thrice/week  -Hx of PE with PEA cardiac arrest requiring ECMO  -Chronic left pleural effusion  -Morbid obesity     Recommendations  1. Ct chest  2. Repeat Beta D glucan and check aspergillus galactomannan  3. Continue Cefepime, fluconazole  4. Await Bone marrow studies    Scarlett Zhu MD    Interval History   Continues to feel poorly, ongoing chronic cough, CXR shows some infiltrate, left ankle appears stable and non tender, ongoing severe weakness and pain in fissured skin     Physical Exam   Temp: 98.5  F (36.9  C) Temp src: Oral BP: 112/55 Pulse: 90   Resp: 16 SpO2: 95 % O2 Device: None (Room air) Oxygen Delivery: 2 LPM  Vitals:    02/02/23 0700 02/03/23 0514 02/04/23 0615   Weight: 133.8 kg (295 lb) 139.3 kg (307 lb) 136.5 kg (301 lb)     Vital Signs with Ranges  Temp:  [97.9  F (36.6   C)-98.5  F (36.9  C)] 98.5  F (36.9  C)  Pulse:  [] 90  Resp:  [16-18] 16  BP: (102-136)/() 112/55  SpO2:  [95 %-99 %] 95 %    Constitutional: Awake, alert, chronically ill appearing female, morbidly obese  Lungs: clear  Cardiovascular: S1S2, murmur  Abdomen: obese, periumbilical hernia with surrounding cellulitis improved,   Skin: crusting and fissuring in skin folds, intertrigo in groin, inframammary and popliteal skin folds  MS : L ankle erythema stable, no warmth and no tenderness    Other:    Medications     - MEDICATION INSTRUCTIONS -       - MEDICATION INSTRUCTIONS -       Warfarin Therapy Reminder         ARIPiprazole  5 mg Oral Daily     atorvastatin  40 mg Oral QPM     ceFEPIme  2 g Intravenous Q12H     DULoxetine  120 mg Oral Daily     filgrastim  480 mcg Subcutaneous Daily at 8 pm     fluconazole  200 mg Oral Daily     folic acid  2 mg Oral Daily     gabapentin  100 mg Oral TID     leucovorin calcium  100 mg/m2 Intravenous Q6H     miconazole   Topical BID     nystatin  500,000 Units Swish & Spit 4x Daily     pantoprazole  40 mg Intravenous Q12H     sodium chloride (PF)  3 mL Intracatheter Q8H       Data   All microbiology laboratory data reviewed.  Recent Labs   Lab Test 02/03/23  0734 02/02/23  0800 02/01/23  0734   WBC 0.3* 0.4* 0.3*   HGB 7.1* 7.2* 7.2*   HCT 22.4* 22.4* 22.0*   MCV 89 88 87   PLT 21* 39* 25*     Recent Labs   Lab Test 02/03/23  0734 02/02/23  1313 02/01/23  0734   CR 1.83* 1.79* 2.41*     Recent Labs   Lab Test 01/13/21  1419   SED 15       Imaging  2/3 CXr  EXAM: XR CHEST 1 VIEW  LOCATION: [facility]  DATE/TIME: 2/3/2023 6:41 PM     INDICATION: Cough. Neutropenia.  COMPARISON: 01/28/2023                                                                      IMPRESSION: Mild opacity at the left lung base is new since the previous exam, and could be related to atelectasis or pneumonia. The lungs are otherwise clear. No pneumothorax. Pulmonary  vascularity is within normal limits.

## 2023-02-04 NOTE — PROGRESS NOTES
[facility]    Medicine Progress Note - Hospitalist Service    Date of Admission:  1/28/2023    Assessment & Plan     Shira Rodriguez is a 62-year-old female with rheumatoid arthritis on methotrexate, prior massive PE on warfarin, morbid obesity who presented on 1/28/2023 with severe sepsis.  She was found unresponsive in her home. She  Was hypotensive and  had temp of 100.2 in route to hospital.      Severe sepsis-present on admission  candida intertrigo  Neutropenic fever  Lactic acidosis -Resolved   -  Presented With lactic acidosis of 3.2, hypotension, HR of 105, WBC of 0.4, acute metabolic encephalopathy - met criteria for severe sepsis on admission  - CT C/A/P showed small chronic left pleural effusion, small fluid around distal esophagus and duodenum  - she was  Noted to have panniculitis under abdominal pannus, significant redness under her breasts.  Source could be panniculitis, mucositis due to severe neutropenia, duodenitis.  - UA does not suggest UTI, no pneumonia, COVID-19, influenza and RSV negative and   Has a cough which is chronic. Has had severe throat pain for 2 days.  Has diverticulosis but no diverticulitis  - Blood cultures and urine cultures negative so far  - Blood pressure improved with IV fluids and is currently in normal range.    - Lactic acidosis has resolved  -She was initially started on broad-spectrum antibiotics including cefepime, vancomycin and infectious disease and oncology were consulted.  -Her antibiotics were switched to cefepime, doxycycline and fluconazole( to cover for Candida intertrigo)  -She has infectious work-up sent including tick panel which has been pending, and her serum Fungitell assay( 1,3 beta d glucan) is positive and tested negative for hepatitis B and C and negative for HIV, she also detected positive for EBV IgG, CMV is negative and Blastomyces and histoplasmosis is are negative  And anaplasma, Rickettsia, elhricia are  negative  -He also had immunological work-up and C3 and C4 are negative, ANCA is negative and double-stranded DNA is negative, methotrexate levels are undetectable  -Of note she had an TTE done which was concerning for dissection versus vegetation and she underwent LETA on 1/31 which does not show any evidence of dissection and no evidence of any vegetation  -ID and oncology/hematology on board and will continue patient with current antibiotics  - she completed five days of antibiotics with doxycycline and of note as per infectious diseasenclear significance ofBeta D glucan at this time, no obvious systemic fungal infection   -Continue with Neupogen per hematology  -I again discussed her case in detail with infectious disease and given her chronic cough CT scan of the chest will be done and they have also orderedRepeat Beta D glucan and check aspergillus galactomannan  - Continue with Fluconazole for candida intertrigo  And given her neutropenia continue the patient with cefepime and her cultures have been negative       Acute metabolic encephalopathy-secondary to severe sepsis-resolved  -Head CT-did not show any acute changes  -On exam she is at baseline     Pancytopenia with severe leukopenia and neutropenia, moderate anemia and thrombocytopenia  Severe vitamin B12 deficiency and folate deficiency  - On 12/7/2022, CBC showed hemoglobin 13.4, WBC 10.6, platelets 271  - On admission, 1/28 - CBC showed Hb 9.4, WBC 0.4, ANC 0.1, platelets 59  - Iron studies showed normal iron saturation, low iron binding capacity indicating anemia of chronic disease.  Reticulocyte count suppressed  - B12 low at < 150 and folate low at 2.8  -elevated D-dimer of 7.38, fibrinogen is 932 and PTT is 42 with INR of 1.99  - Peripheral smear was done which shows - Moderate to marked pancytopenia.Negative for schistocytes. Negative for circulating blasts on scanning.  -I also reviewed the results of flow cytometry and it shows polytypic B  cells, no aberrant immunophenotype on T cells and rare to absent CD34 positive blasts  -She does have elevated Kappa and lambda free light chains, monoclonal IgM immunoglobulin of lambda light chain type, IgM is elevated at 395 and IgG and IgA are within normal limits and also reviewed the report of serum electrophoresis  -For oncology of patient's elevatedfibrinogen level and a significantly elevated D-dimer argues against DIC  -Serum methotrexate levels are normal  --She is also receiving Neupogen with goal of ANC more than 1500  -she was started on B12 and folate replacement and given no improvement was started on 2/1/2023 with folinic acid - started off with 1000 mg/m  x 2 doses (done for high dose methotrexate toxicity) and  Will drop to 100 mg/m  now.   -Her platelet count has been on the lower side and she was given 1 unit Each on 1/31,2/1 and 2/2 and her platelet count today is 18  and will give her two unit of platelet   -Status one unit of blood on 1/31/2023 and hemoglobin today is 6.7 and we will give another unit of blood  -Status bone marrow biopsy on 2/3/2023 and results are pending  -We will continue to monitor her hemoglobin closely and if her hemoglobin is less than 7 we will give one unit of blood  -Unclear source at this time of her pancytopenia and I discussed her case in detail with Dr. Saab from oncology     Acute kidney injury on top of CKD 4- Improving  Mild anion gap metabolic acidosis-resolved   - secondary to lactic acidosis and acute kidney injury, pH 7.25 on admission  - Baseline creatinine 2-2.2.    -Creatinine on admission 3.25.    -Nephrology has been consulted since admission and she has been started on bicarb drip and her creatinine is being monitored and has improved to 1.83 and her bicarb has been normalized and bicarb drip was discontinued and creatinine today is 1.94 we will continue to monitor     Probable esophagitis  Probable duodenitis  -CT on admission showed small amount  of fluid adjacent to distal thoracic esophagus and scattered posterior mediastinal lymph nodes along esophagus of uncertain etiology but could be secondary to esophagitis or other inflammatory process.  CT scan also showed small edema around second and third portion of duodenum.  -Lipase is normal  -On IV Protonix, continue and will need to have EGD when she is more stable      Hypomagnesemia-resolved  -She had magnesium of 1.4 on admission and was started on magnesium replacement and magnesium is normal     Hypokalemia  -Her potassium is 3.2 and we will continue her on replacement protocol     Mild hypovolemic hyponatremia-resolved  -Sodium 132 on admission and was given IV fluids and sodium normalized and is 144 today     Type II MI due to severe sepsis  -High-sensitivity troponin mildly elevated at 48 and repeat was normal and can be due to demand     Hyperuricemia, uric acid 8.9     History of massive PE causing PEA arrest, 9/2019  -Patient was started on warfarin and her INR was 2.80 on 1/30 and her pharmacy was consulted for Coumadin management and of note her INR always have been therapeutic deysi has not received any dose of Coumadin during this hospital stay and INR today is 2. 2 0     Rheumatoid arthritis  -Had been on methotrexate for some time, resumed about 2 weeks ago due to worsening back and shoulder pain  -Hold methotrexate      3 cm right adrenal nodule  -Noted on CT.  Was present on previous imaging as well.  Likely benign  -A.m. cortisol 1/29, normal at 43.9  -Will need to keep a close eye and follow as outpatient with her primary care physician     COPD  chronic cough  -No acute exacerbation  - has chronic cough for years and follow with pulmonary at [place] and chest x-ray done on 2/3 showedMild opacity at the left lung base is new since the previous exam, and could be related to atelectasis or pneumonia. The lungs are otherwise clear. No pneumothorax. Pulmonary vascularity is within normal  "limits.   -CT scan of the chest was ordered along with further fungal work-up     Chronic major depression  Chronic pain  - see recent pain clinic visit summary from 1/17/2023 for background information  - Continue Abilify, Cymbalta and Neurontin     Morbid obesity, BMI 46     Hypoalbuminemia, albumin 2.5     Panniculitis  Candidal rash-under pannus, breasts  -Wound care on board and continue with miconazole powder     Essential hypertension  -PTA-lisinopril  -Hold lisinopril due to acute kidney injury and hypotension on admission  -We will continue to monitor her blood pressure closely and they have been stable     Severe Obesity: Estimated body mass index is 45.9 kg/m  as calculated from the following:    Height as of this encounter: 1.676 m (5' 6\").    Weight as of this encounter: 129 kg (284 lb 6.3 oz).    Dysphagia   Mucositis   - she has been complaining of throat pain and on exam of her mouth there are area of ulcer and we will consult speech  - we will also use magic mouth wash and nystatin  - speech consulted and reviewed notes and appreciate input and will need to have video swallow study with stable  - may need EGD a above but will monitor for now and patient wants to hold consulting GI for now       Diet: Minced & Moist Diet (level 5) Mildly Thick (level 2)  Snacks/Supplements Adult: Other; L: gel20, D: magic cup; With Meals    DVT Prophylaxis: Warfarin  [place] Catheter: Not present  Lines: None     Cardiac Monitoring: None  Code Status: Full Code      Clinically Significant Risk Factors        # Hypokalemia: Lowest K = 3 mmol/L in last 2 days, will replace as needed       # Hypoalbuminemia: Lowest albumin = 2.5 g/dL at 1/28/2023 10:21 PM, will monitor as appropriate   # Thrombocytopenia: Lowest platelets = 18 in last 2 days, will monitor for bleeding         # Severe Obesity: Estimated body mass index is 48.58 kg/m  as calculated from the following:    Height as of this encounter: 1.676 m (5' 6\").    " Weight as of this encounter: 136.5 kg (301 lb).          Disposition Plan      Expected Discharge Date: 02/09/2023                  Prem Levy MD  Hospitalist Service  [facility]  Securely message with [employer] (more info)  Text page via "[employer]" Paging/Directory   ______________________________________________________________________    Interval History     Saw the patient today and she mentioned to me that she continues to have pain but is better controlled with dressing changes.  She denies any fever or chills.  Swallowing is still difficult but is able to tolerate the minced diet as recommended by speech    Discussed plan of care with patient's nurse, Dr. Zhu from infectious disease and hematology      Physical Exam   Vital Signs: Temp: 97.4  F (36.3  C) Temp src: Oral BP: 138/79 Pulse: 89   Resp: 18 SpO2: 96 % O2 Device: None (Room air)    Weight: 301 lbs 0 oz        General: Has a flat affect but is alert oriented x3  Oral : area of ulcer  Respiratory:CTLA  Cardiovascular: Regular rate , S1 and S2 normal with no murmer or rubs or gallops  Abdomen:   soft , non tender nondistended   Skin: She has significant panniculitis/candidia around the groin, breast folds bilaterally  Musculoskeletal: Normal Range of motion over upper and lower extremities bilaterally   Psychiatric: cooperative     Medical Decision Making             Time spent in care of patient is 52minutes and I reviewed the patient basic metabolic panel, CBC, discussed with hematology and ID and sister  , further treatment plan formulated    High complexity    She is critically ill and her prognosis is guarded    Data     I have personally reviewed the following data over the past 24 hrs:    0.3 (LL)  \   6.7 (LL)   / 18 (LL)     144 108 (H) 44.5 (H) /  77   3.2 (L) 25 1.94 (H) \       INR:  2.20 (H) PTT:  N/A   D-dimer:  N/A Fibrinogen:  N/A       Imaging results reviewed over the past 24 hrs:   Recent Results (from the past 24  hour(s))   XR Chest 1 View    Narrative    EXAM: XR CHEST 1 VIEW  LOCATION: [facility]  DATE/TIME: 2/3/2023 6:41 PM    INDICATION: Cough. Neutropenia.  COMPARISON: 01/28/2023      Impression    IMPRESSION: Mild opacity at the left lung base is new since the previous exam, and could be related to atelectasis or pneumonia. The lungs are otherwise clear. No pneumothorax. Pulmonary vascularity is within normal limits.

## 2023-02-04 NOTE — PROGRESS NOTES
CLINICAL NUTRITION SERVICES  -  ASSESSMENT NOTE      Recommendations Ordered by Registered Dietitian (RD):   - ordered thera-vit-m for wound healing   - ordered gel20 with lunch (orange)  - ordered magic cup with dinner (chocolate or vanilla)   Malnutrition:   % Weight Loss:  None noted  % Intake:  <75% for > 7 days (moderate malnutrition)   Subcutaneous Fat Loss:  Unable to assess  Muscle Loss:  Unable to assess  Fluid Retention:  Mild bilateral ankle and feet edema     Malnutrition Diagnosis: Unable to determine due to lack of nutrition hx and NPFA       REASON FOR ASSESSMENT  Shira Rodriguez is a 62 year old female seen by Registered Dietitian for LOS      NUTRITION HISTORY  - Information obtained from chart review  - Unable to obtain nutrition history from pt. 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 Brought in with low blood pressure. Patient looked disheveled and somewhat poorly cared for.   - found to have suspected severe sepsis with neutropenic fever, acute pancytopenia, and severe KRISS.  - per H&P, pt has had severe progressive fatigue that has been ongoing for at least the past few months. It is associated with worsening of her chronic back and bilateral shoulder pain, as well as low appetite and low PO intake.  - per chart review, pt was last seen by RD at [facility] in 10/2019.   - attempted to visit with pt this morning. Pt was busy with another provider at this time    CURRENT NUTRITION ORDERS  Diet Order: Level 5: Minced & Moist Dysphagia Diet  and Mildly Thick  Thickened Liquids     Current Intake/Tolerance:  - per nursing flow sheet, % intakes documented. Mostly 75%  - per health touch, pt typically  "receiving BID small meals.       NUTRITION FOCUSED PHYSICAL ASSESSMENT FOR DIAGNOSING MALNUTRITION)  No: Patient not available                Obtained from Chart/Interdisciplinary Team:  - per ED note, pt is Pale appearing generally deconditioned.  Poor general hygiene     ANTHROPOMETRICS  Height: 5' 6\"  Weight: 301 lbs 0 oz  Body mass index is 48.58 kg/m .  Weight Status:  Obesity Grade III BMI >40  IBW: 59.1 kg  % IBW: 231%  Weight History: recently stable if not trending up  02/04/23 : 136.5 kg (301 lb)  01/17/23 : 129.3 kg (285 lb)   12/06/22 : 129.3 kg (285 lb)  11/15/22 : 132.5 kg (292 lb)  09/29/22 : 132.7 kg (292 lb 8 oz)  06/29/22 : 132 kg (291 lb)  06/20/22 : 132.3 kg (291 lb 11.2 oz)  05/24/22 : 133.6 kg (294 lb 9.6 oz)  03/14/22 : 136.1 kg (300 lb)  11/23/21 : 136.1 kg (300 lb)  07/16/21 : 138.2 kg (304 lb 10.8 oz)  06/16/21 : 140.1 kg (308 lb 14.4 oz)  05/18/21 : 143.6 kg (316 lb 8 oz)    LABS  Labs reviewed  - 2/3: K+ 3.3 (L)  - 2/2: BUN 42.4 (H), creatinine 1.83 (H)    MEDICATIONS  Medications reviewed: vitamin B12 injection (daily 1/29-2/5), folic acid, protonix    PER CHART REVIEW:  Skin: WOC following, last assessed 2/2  Skin Injury Location: Bilateral breast, bilateral groin, pannus, umbilicus, right popliteal, gluteal fold  Skin injury due to: Fungal rash  and Intertrigo  Skin history and plan of care:  Patient on disability, patient has no assistance at home. Poor hygiene. Arrives with rashes and denudement within multiple skin folds  STATUS: very slight improvement in most areas    ASSESSED NUTRITION NEEDS PER APPROVED PRACTICE GUIDELINES:  Dosing Weight: 78.5 kg (adjusted, 136.5 kg on 2/4)  Estimated Energy Needs: 5743-4177 kcals (15-20 Kcal/Kg)  Justification: maintenance r/t BMI  Estimated Protein Needs:  grams protein (1.2-1.5 g pro/Kg)  Justification: wound healing, BMI, and preservation of lean body mass  Estimated Fluid Needs: 1 mL/Kcal  Justification: maintenance OR per provider " pending fluid status    MALNUTRITION:  % Weight Loss:  None noted  % Intake:  <75% for > 7 days (moderate malnutrition)   Subcutaneous Fat Loss:  Unable to assess  Muscle Loss:  Unable to assess  Fluid Retention:  Mild bilateral ankle and feet edema     Malnutrition Diagnosis: Unable to determine due to lack of nutrition hx and NPFA    NUTRITION DIAGNOSIS:  Increased nutrient needs (protein) related to wound healing as evidenced by min protein needs of 1.2 g/kg, need for ONS      NUTRITION INTERVENTIONS  Recommendations / Nutrition Prescription  - ordered thera-vit-m for wound healing   - ordered gel20 with lunch (orange)  - ordered magic cup with dinner (chocolate or vanilla)  - Nutrition education: Per Provider order if indicated       Implementation  Medical Food Supplement   Multivitamin/Mineral      Nutrition Goals  Patient to consume % of nutritionally adequate meals BID+ with supplements over the next 5-7 days.      MONITORING AND EVALUATION:  Progress towards goals will be monitored and evaluated per protocol and Practice Guidelines      Arline Garcia RD, LD

## 2023-02-04 NOTE — PLAN OF CARE
[id number]  Wound cares done this evening, very painful for patient, premed with IV dilaudid. Oxy given x 1 for generalized pain. BMB site is CDI. T/R q 2 hours as pt allows. On K protocol, recheck with am labs. Mouth/throat is still sore, minimal intake, diet is minced/moist with mildly thickened liquids. Tele is NSR. Up with lift. On pulsate mattress. Continue neutropenic precautions.

## 2023-02-04 NOTE — PLAN OF CARE
Goal Outcome Evaluation:           Overall Patient Progress: no changeOverall Patient Progress: no change    Outcome Evaluation: pt busy with other cares today. recent intakes of %, BID small meals. ordered BID supplements for wound healing, incrased protein intake    Arline Garcia RD, LD

## 2023-02-05 LAB
ANION GAP SERPL CALCULATED.3IONS-SCNC: 11 MMOL/L (ref 7–15)
BLD PROD TYP BPU: NORMAL
BLOOD COMPONENT TYPE: NORMAL
BUN SERPL-MCNC: 43.9 MG/DL (ref 8–23)
CALCIUM SERPL-MCNC: 8.2 MG/DL (ref 8.8–10.2)
CHLORIDE SERPL-SCNC: 107 MMOL/L (ref 98–107)
CODING SYSTEM: NORMAL
CREAT SERPL-MCNC: 1.95 MG/DL (ref 0.51–0.95)
DEPRECATED HCO3 PLAS-SCNC: 24 MMOL/L (ref 22–29)
ELLIPTOCYTES BLD QL SMEAR: SLIGHT
ERYTHROCYTE [DISTWIDTH] IN BLOOD BY AUTOMATED COUNT: 13.7 % (ref 10–15)
GALACTOMANNAN AG SERPL QL IA: NEGATIVE
GALACTOMANNAN AG SPEC IA-ACNC: 0.27
GFR SERPL CREATININE-BSD FRML MDRD: 29 ML/MIN/1.73M2
GLUCOSE SERPL-MCNC: 89 MG/DL (ref 70–99)
HCT VFR BLD AUTO: 23.3 % (ref 35–47)
HGB BLD-MCNC: 7.5 G/DL (ref 11.7–15.7)
INR PPP: 2.01 (ref 0.85–1.15)
ISSUE DATE AND TIME: NORMAL
MCH RBC QN AUTO: 29.1 PG (ref 26.5–33)
MCHC RBC AUTO-ENTMCNC: 32.2 G/DL (ref 31.5–36.5)
MCV RBC AUTO: 90 FL (ref 78–100)
PLAT MORPH BLD: ABNORMAL
PLATELET # BLD AUTO: 31 10E3/UL (ref 150–450)
POTASSIUM SERPL-SCNC: 3.1 MMOL/L (ref 3.4–5.3)
POTASSIUM SERPL-SCNC: 3.4 MMOL/L (ref 3.4–5.3)
RBC # BLD AUTO: 2.58 10E6/UL (ref 3.8–5.2)
RBC MORPH BLD: ABNORMAL
SODIUM SERPL-SCNC: 142 MMOL/L (ref 136–145)
UNIT ABO/RH: NORMAL
UNIT NUMBER: NORMAL
UNIT STATUS: NORMAL
UNIT TYPE ISBT: 6200
WBC # BLD AUTO: 0.4 10E3/UL (ref 4–11)

## 2023-02-05 PROCEDURE — 250N000013 HC RX MED GY IP 250 OP 250 PS 637: Performed by: NURSE PRACTITIONER

## 2023-02-05 PROCEDURE — 36415 COLL VENOUS BLD VENIPUNCTURE: CPT | Performed by: HOSPITALIST

## 2023-02-05 PROCEDURE — 85027 COMPLETE CBC AUTOMATED: CPT | Performed by: HOSPITALIST

## 2023-02-05 PROCEDURE — P9037 PLATE PHERES LEUKOREDU IRRAD: HCPCS | Performed by: HOSPITALIST

## 2023-02-05 PROCEDURE — 250N000011 HC RX IP 250 OP 636: Performed by: INTERNAL MEDICINE

## 2023-02-05 PROCEDURE — 250N000013 HC RX MED GY IP 250 OP 250 PS 637: Performed by: INTERNAL MEDICINE

## 2023-02-05 PROCEDURE — 250N000013 HC RX MED GY IP 250 OP 250 PS 637: Performed by: SPECIALIST

## 2023-02-05 PROCEDURE — 99232 SBSQ HOSP IP/OBS MODERATE 35: CPT | Performed by: INTERNAL MEDICINE

## 2023-02-05 PROCEDURE — 250N000013 HC RX MED GY IP 250 OP 250 PS 637: Performed by: HOSPITALIST

## 2023-02-05 PROCEDURE — 120N000001 HC R&B MED SURG/OB

## 2023-02-05 PROCEDURE — 250N000011 HC RX IP 250 OP 636: Performed by: HOSPITALIST

## 2023-02-05 PROCEDURE — P9035 PLATELET PHERES LEUKOREDUCED: HCPCS | Performed by: HOSPITALIST

## 2023-02-05 PROCEDURE — 99232 SBSQ HOSP IP/OBS MODERATE 35: CPT | Performed by: HOSPITALIST

## 2023-02-05 PROCEDURE — C9113 INJ PANTOPRAZOLE SODIUM, VIA: HCPCS | Performed by: HOSPITALIST

## 2023-02-05 PROCEDURE — 85610 PROTHROMBIN TIME: CPT | Performed by: HOSPITALIST

## 2023-02-05 PROCEDURE — 84132 ASSAY OF SERUM POTASSIUM: CPT | Performed by: HOSPITALIST

## 2023-02-05 PROCEDURE — 82310 ASSAY OF CALCIUM: CPT | Performed by: HOSPITALIST

## 2023-02-05 RX ORDER — POTASSIUM CHLORIDE 1.5 G/1.58G
40 POWDER, FOR SOLUTION ORAL ONCE
Status: COMPLETED | OUTPATIENT
Start: 2023-02-05 | End: 2023-02-05

## 2023-02-05 RX ORDER — POTASSIUM CHLORIDE 20MEQ/15ML
40 LIQUID (ML) ORAL ONCE
Status: COMPLETED | OUTPATIENT
Start: 2023-02-05 | End: 2023-02-05

## 2023-02-05 RX ORDER — PANTOPRAZOLE SODIUM 40 MG/1
40 TABLET, DELAYED RELEASE ORAL
Status: DISCONTINUED | OUTPATIENT
Start: 2023-02-05 | End: 2023-02-21 | Stop reason: HOSPADM

## 2023-02-05 RX ADMIN — NYSTATIN 500000 UNITS: 100000 SUSPENSION ORAL at 22:09

## 2023-02-05 RX ADMIN — NYSTATIN 500000 UNITS: 100000 SUSPENSION ORAL at 18:57

## 2023-02-05 RX ADMIN — ARIPIPRAZOLE 5 MG: 5 TABLET ORAL at 09:47

## 2023-02-05 RX ADMIN — MULTIPLE VITAMINS W/ MINERALS TAB 1 TABLET: TAB at 09:47

## 2023-02-05 RX ADMIN — OXYCODONE HYDROCHLORIDE 2.5 MG: 5 TABLET ORAL at 19:27

## 2023-02-05 RX ADMIN — DULOXETINE HYDROCHLORIDE 120 MG: 60 CAPSULE, DELAYED RELEASE ORAL at 09:47

## 2023-02-05 RX ADMIN — LEUCOVORIN CALCIUM 245 MG: 350 INJECTION, POWDER, LYOPHILIZED, FOR SOLUTION INTRAMUSCULAR; INTRAVENOUS at 16:34

## 2023-02-05 RX ADMIN — GABAPENTIN 100 MG: 100 CAPSULE ORAL at 09:47

## 2023-02-05 RX ADMIN — OXYCODONE HYDROCHLORIDE 2.5 MG: 5 TABLET ORAL at 12:51

## 2023-02-05 RX ADMIN — POLYETHYLENE GLYCOL 3350 17 G: 17 POWDER, FOR SOLUTION ORAL at 09:48

## 2023-02-05 RX ADMIN — PANTOPRAZOLE SODIUM 40 MG: 40 INJECTION, POWDER, FOR SOLUTION INTRAVENOUS at 06:24

## 2023-02-05 RX ADMIN — FOLIC ACID 2 MG: 1 TABLET ORAL at 09:47

## 2023-02-05 RX ADMIN — LEUCOVORIN CALCIUM 245 MG: 350 INJECTION, POWDER, LYOPHILIZED, FOR SOLUTION INTRAMUSCULAR; INTRAVENOUS at 11:50

## 2023-02-05 RX ADMIN — GABAPENTIN 100 MG: 100 CAPSULE ORAL at 20:01

## 2023-02-05 RX ADMIN — POTASSIUM CHLORIDE 40 MEQ: 20 SOLUTION ORAL at 22:20

## 2023-02-05 RX ADMIN — CEFEPIME HYDROCHLORIDE 2 G: 2 INJECTION, POWDER, FOR SOLUTION INTRAVENOUS at 11:50

## 2023-02-05 RX ADMIN — HYDROMORPHONE HYDROCHLORIDE 0.2 MG: 0.2 INJECTION, SOLUTION INTRAMUSCULAR; INTRAVENOUS; SUBCUTANEOUS at 16:56

## 2023-02-05 RX ADMIN — OXYCODONE HYDROCHLORIDE 2.5 MG: 5 TABLET ORAL at 23:48

## 2023-02-05 RX ADMIN — ATORVASTATIN CALCIUM 40 MG: 40 TABLET, FILM COATED ORAL at 20:01

## 2023-02-05 RX ADMIN — FILGRASTIM 480 MCG: 480 INJECTION, SOLUTION INTRAVENOUS; SUBCUTANEOUS at 20:01

## 2023-02-05 RX ADMIN — LEUCOVORIN CALCIUM 245 MG: 350 INJECTION, POWDER, LYOPHILIZED, FOR SOLUTION INTRAMUSCULAR; INTRAVENOUS at 04:04

## 2023-02-05 RX ADMIN — FLUCONAZOLE 200 MG: 100 TABLET ORAL at 09:47

## 2023-02-05 RX ADMIN — HYDROMORPHONE HYDROCHLORIDE 0.2 MG: 0.2 INJECTION, SOLUTION INTRAMUSCULAR; INTRAVENOUS; SUBCUTANEOUS at 22:56

## 2023-02-05 RX ADMIN — MICONAZOLE NITRATE: 2 POWDER TOPICAL at 23:35

## 2023-02-05 RX ADMIN — MICONAZOLE NITRATE: 2 POWDER TOPICAL at 09:48

## 2023-02-05 RX ADMIN — PANTOPRAZOLE SODIUM 40 MG: 40 TABLET, DELAYED RELEASE ORAL at 16:34

## 2023-02-05 RX ADMIN — POTASSIUM CHLORIDE 40 MEQ: 1.5 POWDER, FOR SOLUTION ORAL at 14:44

## 2023-02-05 RX ADMIN — GABAPENTIN 100 MG: 100 CAPSULE ORAL at 14:44

## 2023-02-05 RX ADMIN — NYSTATIN 500000 UNITS: 100000 SUSPENSION ORAL at 09:48

## 2023-02-05 ASSESSMENT — ACTIVITIES OF DAILY LIVING (ADL)
ADLS_ACUITY_SCORE: 56
ADLS_ACUITY_SCORE: 52
ADLS_ACUITY_SCORE: 56
ADLS_ACUITY_SCORE: 52
ADLS_ACUITY_SCORE: 60
ADLS_ACUITY_SCORE: 56
ADLS_ACUITY_SCORE: 60
ADLS_ACUITY_SCORE: 52
ADLS_ACUITY_SCORE: 60
ADLS_ACUITY_SCORE: 52

## 2023-02-05 NOTE — PLAN OF CARE
[id number]  Neutropenic precuations maintained. BMB site on L lower back, dressing is CDI. Wound under folds of breast and abdomen and under R knee, cares done per POC, very painful, premed with dilaudid. Platelet recheck was 14, gave a unit of platelets, tolerated well. Up with lift. T/R as pt allows.

## 2023-02-05 NOTE — PLAN OF CARE
Neutropenic precautions maintained. A&Ox4. VSS on RA. R PIV SL, intermittent abx. Tele discontinued. PRN oxycodone x2 with effective relief. Hgb 6.7 and plts 18, received 1 unit blood and 1 unit platelets, tolerated well. Wound cares performed per WOC orders, pre-medicated with IV dilaudid. Potassium replaced, recheck 3.7, next check in AM. CT chest performed, see results. T/R Q2H. A2/lift. Discharge pending improvement.

## 2023-02-05 NOTE — PROGRESS NOTES
Service Date: 02/04/2023    SUBJECTIVE:  Ms. Rodriguez is a 62-year-old female with multiple medical problems including rheumatoid arthritis, hypertension and morbid obesity.    Hematology/Oncology is seeing her for pancytopenia.  On 12/07/2022, CBC was normal.  On 01/28/2023, WBC of 0.4, hemoglobin of 9.4 and platelets of 59.  Since then anemia and thrombocytopenia have gotten worse.    Multiple workups have been done for pancytopenia.  -Severely low vitamin B12 of less than 150.  -Low folate of 2.8.  -Normal iron and saturation index.  -Normal LDH.  -Normal TSH.  -Flow cytometry is normal.  -Peripheral blood smear review did not reveal any circulating blasts or schistocytes.    For further workup, bone marrow biopsy has been done.  Result is pending.    For rheumatoid arthritis, patient was on weekly methotrexate 25 mg.  Methotrexate has been on hold.  Methotrexate level is undetectable.  The patient given leucovorin rescue.    The patient denies taking any new medications recently.  She is on Abilify; she has been taking that for more than a year.    The patient started on Neupogen.  So far it is not helping.    I met with the patient.  She does not feel good.  She feels weak.  The patient says that she has progressive weakness for many months.  No headache.  Some lightheadedness.  No chest pain.  No shortness of breath at rest.  No nausea or vomiting.  No bleeding from any site.  No black stool.    PHYSICAL EXAMINATION:    GENERAL:  She is alert and oriented x 3.  She looked weak.  Not in any pain.  No respiratory distress.  VITAL SIGNS:  Reviewed.  Rest of the systems not examined.    LABORATORY DATA:  Reviewed.    ASSESSMENT:  1.  A 62-year-old female with severe pancytopenia.  2.  Vitamin B12 deficiency.  3.  Folate deficiency.  4.  Neutropenic fever.  Fever has resolved.    PLAN:  1.  The patient has severe pancytopenia.  Her pancytopenia due to several reasons including vitamin B12 deficiency, folate  deficiency and likely bone marrow pathology.  For further workup, bone marrow biopsy was done.  Result is pending.     I explained to the patient that so far we do not have a definitive diagnosis.  Part of the pancytopenia is definitely from vitamin B12 and folate deficiency, but these two alone will not explain this severe pancytopenia.  There is likely some underlying primary bone marrow pathology.  We will wait for the bone marrow biopsy.    2.  For vitamin B12 deficiency, patient is on B12 injection.    For folate deficiency, she is on oral folic acid.      I explained to the patient that these deficiencies are nutritional.  She will need to be on vitamin B12 and folic acid indefinitely.    3.  The patient is on filgrastim.  So far it is not helping.  For now we will continue it.    Transfusion support will be given as needed.    4.  The patient has a history of pulmonary embolism.  The patient was on warfarin.    Anticoagulation is on hold because of thrombocytopenia.  At this time I would recommend continuing to hold anticoagulation.  Anticoagulation can be resumed when her platelets are above 50.    5.  The patient had a few questions, which were all answered.  Hematology/Oncology will continue to follow.  Case discussed with Dr. Levy.    TOTAL TIME SPENT:  50 minutes.  Time was spent in today's visit, review of chart/investigations today, communicating with other providers and documentation today.      Priscilla Saab MD        D: 2023   T: 2023   MT: [id number]    Name:     XAVIER ROWELL  MRN:      [id number]        Account:      [id number]   :      1960           Service Date: 2023       Document: [id number]

## 2023-02-06 ENCOUNTER — APPOINTMENT (OUTPATIENT)
Dept: OCCUPATIONAL THERAPY | Facility: CLINIC | Age: 63
DRG: 871 | End: 2023-02-06
Payer: COMMERCIAL

## 2023-02-06 ENCOUNTER — APPOINTMENT (OUTPATIENT)
Dept: SPEECH THERAPY | Facility: CLINIC | Age: 63
DRG: 871 | End: 2023-02-06
Payer: COMMERCIAL

## 2023-02-06 DIAGNOSIS — D61.818 OTHER PANCYTOPENIA (H): Primary | ICD-10-CM

## 2023-02-06 LAB
ERYTHROCYTE [DISTWIDTH] IN BLOOD BY AUTOMATED COUNT: 13.6 % (ref 10–15)
HCT VFR BLD AUTO: 24.1 % (ref 35–47)
HGB BLD-MCNC: 7.6 G/DL (ref 11.7–15.7)
IF BLOCK AB SER QL RIA: NEGATIVE
INR PPP: 1.97 (ref 0.85–1.15)
MCH RBC QN AUTO: 28.6 PG (ref 26.5–33)
MCHC RBC AUTO-ENTMCNC: 31.5 G/DL (ref 31.5–36.5)
MCV RBC AUTO: 91 FL (ref 78–100)
PCA IGG SER-ACNC: 0.9 UNITS
PLATELET # BLD AUTO: 48 10E3/UL (ref 150–450)
PLATELET # BLD AUTO: ABNORMAL 10*3/UL
POTASSIUM SERPL-SCNC: 3.9 MMOL/L (ref 3.4–5.3)
RBC # BLD AUTO: 2.66 10E6/UL (ref 3.8–5.2)
WBC # BLD AUTO: 0.6 10E3/UL (ref 4–11)

## 2023-02-06 PROCEDURE — 85027 COMPLETE CBC AUTOMATED: CPT | Performed by: HOSPITALIST

## 2023-02-06 PROCEDURE — 85610 PROTHROMBIN TIME: CPT | Performed by: HOSPITALIST

## 2023-02-06 PROCEDURE — 85041 AUTOMATED RBC COUNT: CPT | Performed by: HOSPITALIST

## 2023-02-06 PROCEDURE — 250N000013 HC RX MED GY IP 250 OP 250 PS 637: Performed by: INTERNAL MEDICINE

## 2023-02-06 PROCEDURE — 250N000011 HC RX IP 250 OP 636: Performed by: INTERNAL MEDICINE

## 2023-02-06 PROCEDURE — 250N000011 HC RX IP 250 OP 636: Performed by: HOSPITALIST

## 2023-02-06 PROCEDURE — 36415 COLL VENOUS BLD VENIPUNCTURE: CPT | Performed by: HOSPITALIST

## 2023-02-06 PROCEDURE — 85007 BL SMEAR W/DIFF WBC COUNT: CPT | Performed by: HOSPITALIST

## 2023-02-06 PROCEDURE — 84132 ASSAY OF SERUM POTASSIUM: CPT | Performed by: INTERNAL MEDICINE

## 2023-02-06 PROCEDURE — 250N000013 HC RX MED GY IP 250 OP 250 PS 637: Performed by: HOSPITALIST

## 2023-02-06 PROCEDURE — G0463 HOSPITAL OUTPT CLINIC VISIT: HCPCS

## 2023-02-06 PROCEDURE — 92526 ORAL FUNCTION THERAPY: CPT | Mod: GN | Performed by: SPEECH-LANGUAGE PATHOLOGIST

## 2023-02-06 PROCEDURE — 250N000013 HC RX MED GY IP 250 OP 250 PS 637: Performed by: NURSE PRACTITIONER

## 2023-02-06 PROCEDURE — 99232 SBSQ HOSP IP/OBS MODERATE 35: CPT | Performed by: INTERNAL MEDICINE

## 2023-02-06 PROCEDURE — 99232 SBSQ HOSP IP/OBS MODERATE 35: CPT | Performed by: HOSPITALIST

## 2023-02-06 PROCEDURE — 99233 SBSQ HOSP IP/OBS HIGH 50: CPT | Performed by: SPECIALIST

## 2023-02-06 PROCEDURE — 120N000001 HC R&B MED SURG/OB

## 2023-02-06 PROCEDURE — 97530 THERAPEUTIC ACTIVITIES: CPT | Mod: GO

## 2023-02-06 PROCEDURE — 250N000013 HC RX MED GY IP 250 OP 250 PS 637: Performed by: SPECIALIST

## 2023-02-06 RX ORDER — ENOXAPARIN SODIUM 100 MG/ML
40 INJECTION SUBCUTANEOUS EVERY 24 HOURS
Status: DISCONTINUED | OUTPATIENT
Start: 2023-02-06 | End: 2023-02-08

## 2023-02-06 RX ADMIN — GABAPENTIN 100 MG: 100 CAPSULE ORAL at 14:07

## 2023-02-06 RX ADMIN — ARIPIPRAZOLE 5 MG: 5 TABLET ORAL at 08:53

## 2023-02-06 RX ADMIN — NYSTATIN 500000 UNITS: 100000 SUSPENSION ORAL at 22:44

## 2023-02-06 RX ADMIN — CEFEPIME HYDROCHLORIDE 2 G: 2 INJECTION, POWDER, FOR SOLUTION INTRAVENOUS at 11:55

## 2023-02-06 RX ADMIN — GABAPENTIN 100 MG: 100 CAPSULE ORAL at 08:53

## 2023-02-06 RX ADMIN — PANTOPRAZOLE SODIUM 40 MG: 40 TABLET, DELAYED RELEASE ORAL at 05:55

## 2023-02-06 RX ADMIN — NYSTATIN 500000 UNITS: 100000 SUSPENSION ORAL at 08:54

## 2023-02-06 RX ADMIN — OXYCODONE HYDROCHLORIDE 2.5 MG: 5 TABLET ORAL at 17:53

## 2023-02-06 RX ADMIN — MICONAZOLE NITRATE: 2 POWDER TOPICAL at 22:52

## 2023-02-06 RX ADMIN — NYSTATIN 500000 UNITS: 100000 SUSPENSION ORAL at 17:52

## 2023-02-06 RX ADMIN — FOLIC ACID 2 MG: 1 TABLET ORAL at 08:52

## 2023-02-06 RX ADMIN — ENOXAPARIN SODIUM 40 MG: 40 INJECTION SUBCUTANEOUS at 14:07

## 2023-02-06 RX ADMIN — OXYCODONE HYDROCHLORIDE 2.5 MG: 5 TABLET ORAL at 23:08

## 2023-02-06 RX ADMIN — MULTIPLE VITAMINS W/ MINERALS TAB 1 TABLET: TAB at 08:53

## 2023-02-06 RX ADMIN — DULOXETINE HYDROCHLORIDE 120 MG: 60 CAPSULE, DELAYED RELEASE ORAL at 08:52

## 2023-02-06 RX ADMIN — GABAPENTIN 100 MG: 100 CAPSULE ORAL at 19:52

## 2023-02-06 RX ADMIN — POLYETHYLENE GLYCOL 3350 17 G: 17 POWDER, FOR SOLUTION ORAL at 08:54

## 2023-02-06 RX ADMIN — MICONAZOLE NITRATE: 2 POWDER TOPICAL at 08:57

## 2023-02-06 RX ADMIN — HYDROMORPHONE HYDROCHLORIDE 0.2 MG: 0.2 INJECTION, SOLUTION INTRAMUSCULAR; INTRAVENOUS; SUBCUTANEOUS at 16:24

## 2023-02-06 RX ADMIN — CEFEPIME HYDROCHLORIDE 2 G: 2 INJECTION, POWDER, FOR SOLUTION INTRAVENOUS at 00:04

## 2023-02-06 RX ADMIN — FILGRASTIM 480 MCG: 480 INJECTION, SOLUTION INTRAVENOUS; SUBCUTANEOUS at 19:52

## 2023-02-06 RX ADMIN — NYSTATIN 500000 UNITS: 100000 SUSPENSION ORAL at 14:07

## 2023-02-06 RX ADMIN — ATORVASTATIN CALCIUM 40 MG: 40 TABLET, FILM COATED ORAL at 19:52

## 2023-02-06 RX ADMIN — SENNOSIDES AND DOCUSATE SODIUM 2 TABLET: 50; 8.6 TABLET ORAL at 08:52

## 2023-02-06 RX ADMIN — PANTOPRAZOLE SODIUM 40 MG: 40 TABLET, DELAYED RELEASE ORAL at 16:24

## 2023-02-06 RX ADMIN — FLUCONAZOLE 200 MG: 100 TABLET ORAL at 08:54

## 2023-02-06 RX ADMIN — SENNOSIDES AND DOCUSATE SODIUM 2 TABLET: 50; 8.6 TABLET ORAL at 23:57

## 2023-02-06 ASSESSMENT — ACTIVITIES OF DAILY LIVING (ADL)
ADLS_ACUITY_SCORE: 58
ADLS_ACUITY_SCORE: 58
ADLS_ACUITY_SCORE: 60
ADLS_ACUITY_SCORE: 58
ADLS_ACUITY_SCORE: 58
ADLS_ACUITY_SCORE: 60
ADLS_ACUITY_SCORE: 60

## 2023-02-06 NOTE — PROGRESS NOTES
Service Date: 02/06/2023    SUBJECTIVE:  Ms. Rodriguez is a 62-year-old female with pancytopenia.  Multiple workup has been done for it.  She was found to have low vitamin B12 and folate level.  She is on replacement.  She had a bone marrow biopsy done.  Pathology is pending.  I spoke to the pathologist.  There is no evidence of leukemia or MDS.  Bone marrow is hypocellular.  It is not aplastic.  Hypocellular marrow is causing the pancytopenia.    The patient had been on weekly methotrexate 25 mg for rheumatoid arthritis.  Methotrexate is on hold.  Methotrexate level checked a few days after stopping methotrexate was undetectable.  In any case, she was treated with leucovorin rescue.    For pancytopenia, she has been on supportive treatment.  The patient is on Neupogen.  There has been only minimal improvement in her leukopenia.    For thrombocytopenia, patient received platelet transfusion.  Platelet has responded appropriately.  This goes against immune thrombocytopenia.    I met with the patient.  She feels very weak.  No headache.  Some lightheadedness.  No chest pain.  No worsening shortness of breath.  Some nausea, no vomiting.  Appetite is fair.  No bleeding from any site.  Some easy bruising.    PHYSICAL EXAMINATION:    GENERAL:  She is alert and oriented x 3.  Not in any pain or respiratory distress.  VITAL SIGNS:  Reviewed.  Rest of systems not examined.    LABS:  Reviewed.    ASSESSMENT:     1.  A 62-year-old female with pancytopenia.  This is secondary to hypocellular marrow.  Hypocellular marrow is likely the result of methotrexate toxicity.  2.  Rheumatoid arthritis.  Methotrexate is on hold.  3.  Vitamin B12 deficiency  4.  Folate deficiency.  5.  Elevated creatinine  6.  Pulmonary embolism.  Warfarin on hold because of thrombocytopenia.  7.  Neutropenic fever.  Fever has resolved.    PLAN:     1.  Bone marrow was reviewed with the patient.  She was happy to know that there is no evidence of any  leukemia.    Bone marrow is hypocellular.  This is likely result of the methotrexate.  Methotrexate has been on hold.    2.  I explained to her that it might take few weeks for her bone marrow to recover.  In the meantime, we will continue with supportive treatment.    She is on Neupogen.  We will continue her on it.    She should be transfused for hemoglobin below 7 and platelets below 20 or if she has bleeding.    3.  The patient is on cefepime.  This likely will be stopped.  Once it is stopped, I would recommend that the patient be on prophylactic Levaquin, fluconazole, and acyclovir if she continues to have severe neutropenia.    4.  The patient has a history of pulmonary embolism.  She is not on anticoagulation because of thrombocytopenia.  She is at risk of thrombosis as she is weak and mostly bed bound.    Her platelet is 48.  It is reasonable to start prophylactic Lovenox.    5.  For B12 deficiency, she received vitamin B12.  We will recheck a vitamin B12 level.  I expect it to be normal.  She will still need to get monthly vitamin B12 or daily oral vitamin B12.    The patient has folate deficiency and has been on oral folic acid.  She will continue on that.    6.  The patient has some pain in the throat.  This is likely from neutropenia.  Other possibility is methotrexate toxicity causing it. Magic Mouthwash will be continued as needed.  She will also continue on pain medication.      7.  The patient had a few questions, which were all answered.  Hematology/Oncology will continue to follow.  Case discussed with Dr. Sahni.    TOTAL TIME SPENT:  35 minutes.  Time spent in today's visit, review of chart/investigations today, communicating with other providers and documentation today.    Priscilla Saab MD        D: 2023   T: 2023   MT: NICOLE    Name:     XAVIER ROWELL  MRN:      [id number]        Account:      [id number]   :      1960           Service Date: 2023        Document: [id number]

## 2023-02-06 NOTE — PROGRESS NOTES
[facility]    Infectious Disease Progress Note    Date of Service : 02/06/2023     Assessment:  62 YF with RA on Methotrexate (recently transitioned from subcutaneous dosing to PO), morbid obesity , hx of PEA cardiac arrest due to PE who has been hospitalized with progressive fatigue, was found down and is noted to have severe pancytopenia as well as lactic acidosis related to neutropenic fever. She has significant candida intertrigo infection in the inframammary and groin folds. No bacteremia or other focus of infection found thus far except for mild periumbilical cellulitis. She had severe B12/folate deficiency which has been corrected, however, pancytopenia remains persistent and severe.      -Pancytopenia ? Methotrexate toxicity vs other hematological disorder ? Aplastic anemia  -Neutropenic fever resolved  -B12/folate deficiency  -Mild periumbilical cellulitis improved, and left ankle erythema without tenderness  -Fungal intertrigo infection in inframammary and groin skin folds  -KRISS   -Lactic acidosis resolved  -Rheumatoid arthritis - on Methotrexate thrice/week -on hold  -Hx of PE with PEA cardiac arrest requiring ECMO  -Chronic left pleural effusion  -Morbid obesity     Recommendations  1. Awaiting bone marrow biopsy results  2. Discontinue Fluconazole  3. Maintaining on cefepime due to ongoing neutropenia though no obvious infection at this time  4. Wound care and supportive care      Scarlett Zhu MD    Interval History   Resting, continues to feel poorly, CT chest did not show concern for fungal pneumonia, cough is stable, feels a little better, no new complaints    Physical Exam   Temp: 98.4  F (36.9  C) Temp src: Oral BP: 117/63 Pulse: 89   Resp: 18 SpO2: 94 % O2 Device: None (Room air)    Vitals:    02/04/23 0615 02/05/23 0618 02/06/23 0630   Weight: 136.5 kg (301 lb) 136.5 kg (301 lb) 136.5 kg (301 lb)     Vital Signs with Ranges  Temp:  [97.8  F (36.6  C)-98.4  F (36.9  C)]  98.4  F (36.9  C)  Pulse:  [89-99] 89  Resp:  [16-18] 18  BP: (110-141)/(63-90) 117/63  SpO2:  [94 %-98 %] 94 %    Constitutional: Awake, alert, cooperative, chronically ill appearing  Lungs: clear  Cardiovascular: S1S2, murmur  Abdomen: obese, periumbilical hernia with surrounding cellulitis improved,   Skin: crusting and fissuring in skin folds, intertrigo in groin, inframammary and popliteal skin folds  MS : L ankle erythema stable, no warmth and no tenderness    Other:    Medications     - MEDICATION INSTRUCTIONS -       - MEDICATION INSTRUCTIONS -       Warfarin Therapy Reminder         ARIPiprazole  5 mg Oral Daily     atorvastatin  40 mg Oral QPM     ceFEPIme  2 g Intravenous Q12H     DULoxetine  120 mg Oral Daily     filgrastim  480 mcg Subcutaneous Daily at 8 pm     fluconazole  200 mg Oral Daily     folic acid  2 mg Oral Daily     gabapentin  100 mg Oral TID     miconazole   Topical BID     multivitamin w/minerals  1 tablet Oral Daily     nystatin  500,000 Units Swish & Spit 4x Daily     pantoprazole  40 mg Oral BID AC     sodium chloride (PF)  3 mL Intracatheter Q8H     warfarin-No DOSE today  1 each Does not apply no dose today (warfarin)       Data   All microbiology laboratory data reviewed.  Recent Labs   Lab Test 02/06/23  0554 02/06/23  0210 02/05/23  0949 02/04/23  2224 02/04/23  0727   WBC  --  0.6* 0.4*  --  0.3*   HGB  --  7.6* 7.5*  --  6.7*   HCT  --  24.1* 23.3*  --  20.5*   MCV  --  91 90  --  88   PLT 48*  --  31* 14* 18*     Recent Labs   Lab Test 02/05/23  0922 02/04/23  0928 02/03/23  0734   CR 1.95* 1.94* 1.83*     Recent Labs   Lab Test 01/13/21  1419   SED 15       Imaging  2/4 Ct chest WO contrast  EXAM: CT CHEST WITHOUT CONTRAST  LOCATION: [facility]  DATE/TIME: 02/04/2023, 2:35 PM     INDICATION: Neutropenia. Cough.  COMPARISON: Chest CT performed 01/29/2023.  TECHNIQUE: CT chest without IV contrast. Multiplanar reformats were obtained. Dose reduction  techniques were used.  CONTRAST: None.     FINDINGS:   LUNGS AND PLEURA: A small left pleural effusion has increased slightly in size. A small right pleural effusion is new since the previous exam. There is mild atelectasis at both lung bases posteriorly, also increased slightly. A 0.3 cm right upper lobe   pulmonary nodule (series 4 image 114) is unchanged. No lung masses are identified. No pneumothorax.     MEDIASTINUM/AXILLAE: No enlarged lymph nodes in the chest. No pericardial effusion. Mild atherosclerotic calcification of the thoracic aorta.     CORONARY ARTERY CALCIFICATION: Mild.     UPPER ABDOMEN: Small hiatal hernia. Indeterminate right adrenal nodule is unchanged, measuring 3 cm.     MUSCULOSKELETAL: Degenerative changes in the thoracic spine.                                                                      IMPRESSION:   1.  Small bilateral pleural effusions, increased slightly in size on the left and new on the right.  2.  Mild atelectasis at both lung bases posteriorly has also increased slightly.

## 2023-02-06 NOTE — PROVIDER NOTIFICATION
MD Notification    Notified Person: MD    Notified Person Name: Dr. Saab     Notification Date/Time: 2/6/23 at 0400    Notification Interaction: phone    Purpose of Notification: Platelet count of 22, no clear orders whether to transfuse or not?    Orders Received: Do not transfuse at this time, Dr. Saab will round on pt today    Comments:

## 2023-02-06 NOTE — PROGRESS NOTES
Service Date: 02/05/2023    SUBJECTIVE:  Ms. Rodriguez is a 62-year-old female with pancytopenia.  Multiple workup has been done.  She has low vitamin B12 and folate level.  She is on vitamin B12 and folate replacement.  The patient had a bone marrow biopsy done.  Result is pending.    She has rheumatoid arthritis and was on methotrexate.  Methotrexate is on hold.  Methotrexate level was checked a few days after methotrexate was held. It was undetectable.  Decison was made to treat her with leucovorin rescue which she has been getting.    The patient has been on Neupogen.  So far, her WBC has not responded to it.  The patient has received platelet and PRBC transfusion.  Her platelet has responded well.  That goes against immune cytopenia.  The patient may have aplastic anemia. We will have to wait for bone marrow biopsy.    The patient continues to feel weak.  No bleeding from any site.  Some easy bruising.    No headache.  Some lightheadedness.  No chest pain.  She does have mild shortness of breath.  No worsening.  No nausea or vomiting.    PHYSICAL EXAMINATION:    GENERAL:  She is alert and oriented.  Not in distress.  VITAL SIGNS:  Reviewed.  Rest of systems not examined.    LABS:  Reviewed.    ASSESSMENT:   1.  A 62-year-old female with severe pancytopenia.  2.  Vitamin B12 deficiency.  3.  Folate deficiency.  4.  Neutropenic fever.  Fever has resolved.  ID following.  5.  Pulmonary embolism.  Warfarin on hold.    PLAN:    1.  The patient is clinically stable.  She continues to be pancytopenic.  Cytopenia has really not improved.  Some improvement in platelet because of transfusion.  Bone marrow biopsy was done.  Hopefully, we will get some preliminary report tomorrow.  2.  The patient has been on leucovorin rescue.  We will stop that.  3.  The patient is on Neupogen.  So far WBC has not responded.  For now, we will continue Neupogen.  After the bone marrow biopsy we will make a decision to continue or stop  it.  4.  Because of thrombocytopenia, she will not get anticoagulation.  5.  Hematology/Oncology will continue to follow.  Case discussed with Dr. Levy.    TOTAL TIME SPENT:  25 minutes.  Time spent in today's visit, review of chart/investigations today and documentation today.    Priscilla Saab MD        D: 2023   T: 2023   MT: FABIAN    Name:     XAVIER ROWELL  MRN:      [id number]        Account:      [id number]   :      1960           Service Date: 2023       Document: [id number]

## 2023-02-06 NOTE — PROGRESS NOTES
[facility]    Medicine Progress Note - Hospitalist Service    Date of Admission:  1/28/2023    Assessment & Plan   Shira Rodriguez is a 62-year-old female with rheumatoid arthritis on methotrexate, prior massive PE on warfarin, morbid obesity who presented on 1/28/2023 with severe sepsis.  She was found unresponsive in her home. She  Was hypotensive and  had temp of 100.2 in route to hospital.        Severe sepsis-present on admission  Candida intertrigo  Neutropenic fever  Lactic acidosis, Resolved   * Presented with lactic acidosis of 3.2, hypotension, HR of 105, WBC of 0.4, acute metabolic encephalopathy - met criteria for severe sepsis on admission; resolved with IVF and empiric antibiotics  * likely potential sources include panniculitis (significant redness under abd pannus and breasts) or mucositis (severe throat pain x2 days) and duodenitis with severe neutropenia.  * CT C/A/P showed small chronic left pleural effusion, small fluid around distal esophagus and duodenum  * TTE 1/30 with possible vegetation; LETA 1/31 negative for vegetation or dissection  * repeat CT chest 2/4 (due to cough) with slight increase in bilateral pleural effusions, mild increase in atelectasis   * ID consulted with extensive infectious work-up: positive fungitell (significance unclear), positive EBV IgG (but negative IgM and DNA)  * negative infectious work-up:  UA and culture, blood cultures, COVID/RSV/flu, Hep B&C, HIV, HSV, CMV, aspergillus, Blastomyces, histoplasmosis, anaplasma, Rickettsia, Ehrlichia   * completed five day course doxycycline 2/3 (empiric tx for tick-borne illness)  * completed 7-day course fluconazole 2/6 (for Candida intertrigo)    - unclear significance of Beta D glucan at this time, no obvious systemic fungal infection     - given ongoing neutropenia, continue cefepime per ID  - per discussion with Heme, may take several weeks for bone marrow to recover, may need prophylactic  valcyclovir, fluconazole, levofloxacin until neutropenia resolves       Pancytopenia with severe leukopenia and neutropenia, moderate anemia and thrombocytopenia  Severe vitamin B12 deficiency and folate deficiency  * CBC wnl 12/7/2022; On admission 1/28 - Hb 9.4, WBC 0.4, ANC 0.1, platelets 59  * Iron studies 1/29 indicating anemia of chronic disease.  Reticulocyte count suppressed.  B12 undetectable and folate low at 2.8  * initiated on B12 and folate, ultimately started on leucovorin rescue 2/2-2/5 per Heme  * methotrexate level 1/30 undetectable   * Peripheral smear 1/29 showed moderate to marked pancytopenia. Negative for schistocytes and elevated fibrinogen argues against DIC. Negative for circulating blasts on scanning.   * flow cytometry shows polytypic B cells, no aberrant immunophenotype on T cells and rare to absent CD34 positive blasts  * elevated Kappa and lambda free light chains, monoclonal IgM immunoglobulin of lambda light chain type, IgM is elevated at 395 and IgG and IgA are within normal limits   * immunological work-up showed C3 and C4 wnl, ANCA and ds-DNA negative  * received 1U PRBC 1/31 and 2/4  * has received at least daily platelet transfusions since 1/30    - discussed with Heme 02/06/23; transfuse for plt <20K or <50K if signs of bleeding, hgb <7 g/dl  - WBC 0.6, plt 48 with stable hgb 02/06/23   - initiated on daily Neupogen 1/30 without significant improvement in counts; continue as per Heme  - continue oral B12 and folate  - Unclear source at this time of her pancytopenia; s/p bone marrow biopsy on 2/3/2023 and results are pending; preliminary negative for leukemia or aplastic anemia       KRISS on CKD stage 4, resolved  Mild anion gap metabolic acidosis, resolved   * Normal renal function in 2021, Cr 1.7 5/2022, more recent baseline creatinine 2-2.2.   Creatinine on admission 3.25.    * Nephrology consulted, suspect pre-renal with possible progression to ATN, treated with IVF with  gradual improvement.  Neph signed off 2/3.   - Cr stable at 1.9 2/5/23; monitor intermittently     Probable esophagitis  Probable duodenitis  Dysphagia   Mucositis   * she has been complaining of throat pain; oral ulcers presents on exam   * CT on admission showed small amount of fluid adjacent to distal thoracic esophagus and scattered posterior mediastinal lymph nodes along esophagus of uncertain etiology but could be secondary to esophagitis or other inflammatory process.  CT scan also showed small edema around second and third portion of duodenum.  - continue magic mouth wash and nystatin  - SLP following  - continue Protonix  - may need EGD above but waiting to consult GI until would be ok for EGD      Panniculitis  Candidal rash-under pannus, breasts  * treated with fluconazole 1/30-2/6 per ID  - Wound care on board   - continue miconazole powder     Hypokalemia  - continue replacement protocol     Type II MI due to severe sepsis  * High-sensitivity troponin mildly elevated at 48 and repeat was normal and can be due to demand  * TTE 1/30 with normal LV function without WMA, RV with mildly decreased systolic function     History of massive PE causing PEA arrest, 9/2019  * last dose of warfarin was 1/29, but INR has remained therapeutic   - continue to hold anticoagulation with plt <50K per Onc  - INR 1.97 02/06/23   - start lovenox ppx per Heme 2/6; hold for platelet <20K or signs of bleeding     Rheumatoid arthritis  * Historically on methotrexate, resumed about 2 weeks prior to admission due to worsening back and shoulder pain.   -Hold methotrexate, follow up with outpatient rheumatologist      COPD  chronic cough  * has chronic cough for years and follows with pulmonary at [facility]  * CXR 2/3 showed mild opacity at the left lung base is new since the previous exam - atelectasis vs pneumonia.   * CT chest 2/4 with findings as above   - no signs of acute exacerbation     Chronic major depression  Chronic pain  *  "see recent pain clinic visit summary from 1/17/2023 for background information  - Continue Abilify, Cymbalta and Neurontin     Essential hypertension  PTA lisinopril initially held due to KRISS and hypotension, which have resolved.  - remains normotensive at this time, resume as indicated     Morbid obesity, BMI 46  Estimated body mass index is 45.9 kg/m  as calculated from the following:    Height as of this encounter: 1.676 m (5' 6\").    Weight as of this encounter: 129 kg (284 lb 6.3 oz).     Acute metabolic encephalopathy secondary to severe sepsis, resolved  Admission head CT negative for acute pathology.  Returned to baseline mental status with treatment of sepsis.     Mild hypovolemic hyponatremia, resolved  Admission sodium 132, normalized with IVF.     Hypomagnesemia, resolved  * replaced per protocol    3 cm right adrenal nodule  * Noted on CT.  Was present on previous imaging as well.  Likely benign  * A.m. cortisol 1/29, normal at 43.9    Hypoalbuminemia, albumin 2.5    Hyperuricemia  * uric acid 8.9 on 1/29/23       Diet: Snacks/Supplements Adult: Other; L: gel20, D: magic cup; With Meals  Combination Diet Soft and Bite Sized Diet (level 6); Mildly Thick (level 2) (no straws)    DVT Prophylaxis: Lovenox   Underwood Catheter: Not present  Lines: None     Cardiac Monitoring: None  Code Status: Full Code      Clinically Significant Risk Factors        # Hypokalemia: Lowest K = 3.1 mmol/L in last 2 days, will replace as needed       # Hypoalbuminemia: Lowest albumin = 2.5 g/dL at 1/28/2023 10:21 PM, will monitor as appropriate   # Thrombocytopenia: Lowest platelets = 14 in last 2 days, will monitor for bleeding         # Severe Obesity: Estimated body mass index is 48.58 kg/m  as calculated from the following:    Height as of this encounter: 1.676 m (5' 6\").    Weight as of this encounter: 136.5 kg (301 lb).          Disposition Plan      Expected Discharge Date: 02/09/2023                  Kirby Sahni, " MD  Hospitalist Service  [facility]  Securely message with Preeti (more info)  Text page via [employer] Paging/Directory   ______________________________________________________________________    Interval History    She reports oral and throat pain is somewhat improved.  Denies any fever/chills, dyspnea.  Ongoing skin pain due to intertrigo.      Physical Exam   Vital Signs: Temp: 98.4  F (36.9  C) Temp src: Oral BP: 117/63 Pulse: 89   Resp: 18 SpO2: 94 % O2 Device: None (Room air)    Weight: 301 lbs 0 oz    General Appearance: Obese female in NAD  Respiratory: lungs CTAB, no wheezes or crackles  Cardiovascular: RRR ,normal s1/s2 without murmur  GI: abdomen soft, normal bowel sounds  Skin: significant erythema of abdominal folds and right knee fold  Other: Alert and appropriate, cranial nerves grossly intact      Medical Decision Making       35 MINUTES SPENT BY ME on the date of service doing chart review, history, exam, documentation & further activities per the note.  MANAGEMENT DISCUSSED with the following over the past 24 hours: hematologist       Data     I have personally reviewed the following data over the past 24 hrs:    0.6 (LL)  \   7.6 (L)   / 48 (LL)     N/A N/A N/A /  N/A   3.9 N/A N/A \       INR:  1.97 (H) PTT:  N/A   D-dimer:  N/A Fibrinogen:  N/A

## 2023-02-06 NOTE — PLAN OF CARE
Neutropenic precautions maintained. A&Ox4. VSS on RA. R PIV SL. Pain managed with PRN oxycodone x1. One unit of platelets transfused, tolerated well. Potassium 3.1, replaced, recheck pending. Wound cares performed, pre-medicated with IV dilaudid but still had significant pain throughout. Purewick with adequate UOP. PRN miralax given, smear noted this evening, but no BM. Repositioned, up with A2/lift. Discharge pending improvement and plan.

## 2023-02-06 NOTE — PLAN OF CARE
[id number]  Neutropenic precautions maintained. K replaced, recheck of 3.9. Wounds under folds of breast, abdomen, and under R knee; dressing changes are very painful, premed with IV dilaudid. WOC following. On minced/moist diet with mildly thick liquids, pt is requesting speech to re-evaluate her today, would like to be able to eat more. Up with lift, T/R. Hem/onc following, BMB results pending.

## 2023-02-07 ENCOUNTER — APPOINTMENT (OUTPATIENT)
Dept: PHYSICAL THERAPY | Facility: CLINIC | Age: 63
DRG: 871 | End: 2023-02-07
Payer: COMMERCIAL

## 2023-02-07 ENCOUNTER — APPOINTMENT (OUTPATIENT)
Dept: SPEECH THERAPY | Facility: CLINIC | Age: 63
DRG: 871 | End: 2023-02-07
Payer: COMMERCIAL

## 2023-02-07 LAB
BASOPHILS # BLD MANUAL: 0 10E3/UL (ref 0–0.2)
BASOPHILS NFR BLD MANUAL: 0 %
BLASTS # BLD MANUAL: 0 10E3/UL
BLASTS NFR BLD MANUAL: 1 %
CREAT SERPL-MCNC: 2.29 MG/DL (ref 0.51–0.95)
EOSINOPHIL # BLD MANUAL: 0 10E3/UL (ref 0–0.7)
EOSINOPHIL NFR BLD MANUAL: 0 %
ERYTHROCYTE [DISTWIDTH] IN BLOOD BY AUTOMATED COUNT: 13.6 % (ref 10–15)
GFR SERPL CREATININE-BSD FRML MDRD: 24 ML/MIN/1.73M2
HCT VFR BLD AUTO: 21.4 % (ref 35–47)
HGB BLD-MCNC: 7.1 G/DL (ref 11.7–15.7)
INR PPP: 1.77 (ref 0.85–1.15)
LYMPHOCYTES # BLD MANUAL: 0.7 10E3/UL (ref 0.8–5.3)
LYMPHOCYTES NFR BLD MANUAL: 52 %
MCH RBC QN AUTO: 29.2 PG (ref 26.5–33)
MCHC RBC AUTO-ENTMCNC: 33.2 G/DL (ref 31.5–36.5)
MCV RBC AUTO: 88 FL (ref 78–100)
METAMYELOCYTES # BLD MANUAL: 0.1 10E3/UL
METAMYELOCYTES NFR BLD MANUAL: 5 %
MONOCYTES # BLD MANUAL: 0.2 10E3/UL (ref 0–1.3)
MONOCYTES NFR BLD MANUAL: 17 %
MYELOCYTES # BLD MANUAL: 0 10E3/UL
MYELOCYTES NFR BLD MANUAL: 1 %
NEUTROPHILS # BLD MANUAL: 0.3 10E3/UL (ref 1.6–8.3)
NEUTROPHILS NFR BLD MANUAL: 24 %
PATH REPORT.COMMENTS IMP SPEC: NORMAL
PATH REPORT.FINAL DX SPEC: NORMAL
PATH REPORT.FINAL DX SPEC: NORMAL
PATH REPORT.MICROSCOPIC SPEC OTHER STN: NORMAL
PATH REPORT.RELEVANT HX SPEC: NORMAL
PLAT MORPH BLD: ABNORMAL
PLATELET # BLD AUTO: 40 10E3/UL (ref 150–450)
POTASSIUM SERPL-SCNC: 3.5 MMOL/L (ref 3.4–5.3)
RBC # BLD AUTO: 2.43 10E6/UL (ref 3.8–5.2)
RBC MORPH BLD: ABNORMAL
VIT B12 SERPL-MCNC: >4000 PG/ML (ref 232–1245)
WBC # BLD AUTO: 1.3 10E3/UL (ref 4–11)

## 2023-02-07 PROCEDURE — 97110 THERAPEUTIC EXERCISES: CPT | Mod: GP

## 2023-02-07 PROCEDURE — 250N000011 HC RX IP 250 OP 636: Performed by: PHYSICIAN ASSISTANT

## 2023-02-07 PROCEDURE — 250N000011 HC RX IP 250 OP 636: Performed by: HOSPITALIST

## 2023-02-07 PROCEDURE — 88341 IMHCHEM/IMCYTCHM EA ADD ANTB: CPT | Mod: 26 | Performed by: PATHOLOGY

## 2023-02-07 PROCEDURE — 250N000013 HC RX MED GY IP 250 OP 250 PS 637: Performed by: NURSE PRACTITIONER

## 2023-02-07 PROCEDURE — 99232 SBSQ HOSP IP/OBS MODERATE 35: CPT | Performed by: SPECIALIST

## 2023-02-07 PROCEDURE — 93010 ELECTROCARDIOGRAM REPORT: CPT | Performed by: INTERNAL MEDICINE

## 2023-02-07 PROCEDURE — 250N000013 HC RX MED GY IP 250 OP 250 PS 637: Performed by: HOSPITALIST

## 2023-02-07 PROCEDURE — 250N000013 HC RX MED GY IP 250 OP 250 PS 637: Performed by: INTERNAL MEDICINE

## 2023-02-07 PROCEDURE — G0463 HOSPITAL OUTPT CLINIC VISIT: HCPCS

## 2023-02-07 PROCEDURE — 88342 IMHCHEM/IMCYTCHM 1ST ANTB: CPT | Mod: 26 | Performed by: PATHOLOGY

## 2023-02-07 PROCEDURE — 85027 COMPLETE CBC AUTOMATED: CPT | Performed by: HOSPITALIST

## 2023-02-07 PROCEDURE — 84132 ASSAY OF SERUM POTASSIUM: CPT | Performed by: HOSPITALIST

## 2023-02-07 PROCEDURE — 88313 SPECIAL STAINS GROUP 2: CPT | Mod: 26 | Performed by: PATHOLOGY

## 2023-02-07 PROCEDURE — 99232 SBSQ HOSP IP/OBS MODERATE 35: CPT | Performed by: INTERNAL MEDICINE

## 2023-02-07 PROCEDURE — 85097 BONE MARROW INTERPRETATION: CPT | Performed by: PATHOLOGY

## 2023-02-07 PROCEDURE — 250N000011 HC RX IP 250 OP 636: Performed by: INTERNAL MEDICINE

## 2023-02-07 PROCEDURE — 99232 SBSQ HOSP IP/OBS MODERATE 35: CPT | Performed by: HOSPITALIST

## 2023-02-07 PROCEDURE — 87040 BLOOD CULTURE FOR BACTERIA: CPT | Performed by: HOSPITALIST

## 2023-02-07 PROCEDURE — 258N000003 HC RX IP 258 OP 636: Performed by: HOSPITALIST

## 2023-02-07 PROCEDURE — 88305 TISSUE EXAM BY PATHOLOGIST: CPT | Mod: 26 | Performed by: PATHOLOGY

## 2023-02-07 PROCEDURE — 36415 COLL VENOUS BLD VENIPUNCTURE: CPT | Performed by: HOSPITALIST

## 2023-02-07 PROCEDURE — 88311 DECALCIFY TISSUE: CPT | Mod: 26 | Performed by: PATHOLOGY

## 2023-02-07 PROCEDURE — 97530 THERAPEUTIC ACTIVITIES: CPT | Mod: GP

## 2023-02-07 PROCEDURE — 82565 ASSAY OF CREATININE: CPT | Performed by: HOSPITALIST

## 2023-02-07 PROCEDURE — 93005 ELECTROCARDIOGRAM TRACING: CPT

## 2023-02-07 PROCEDURE — 92526 ORAL FUNCTION THERAPY: CPT | Mod: GN | Performed by: SPEECH-LANGUAGE PATHOLOGIST

## 2023-02-07 PROCEDURE — 85007 BL SMEAR W/DIFF WBC COUNT: CPT | Performed by: HOSPITALIST

## 2023-02-07 PROCEDURE — 85610 PROTHROMBIN TIME: CPT | Performed by: HOSPITALIST

## 2023-02-07 PROCEDURE — 120N000001 HC R&B MED SURG/OB

## 2023-02-07 PROCEDURE — 82607 VITAMIN B-12: CPT | Performed by: HOSPITALIST

## 2023-02-07 RX ORDER — HYDROMORPHONE HCL IN WATER/PF 6 MG/30 ML
0.2 PATIENT CONTROLLED ANALGESIA SYRINGE INTRAVENOUS 2 TIMES DAILY PRN
Status: DISCONTINUED | OUTPATIENT
Start: 2023-02-07 | End: 2023-02-21 | Stop reason: HOSPADM

## 2023-02-07 RX ORDER — BISACODYL 10 MG
10 SUPPOSITORY, RECTAL RECTAL DAILY PRN
Status: DISCONTINUED | OUTPATIENT
Start: 2023-02-07 | End: 2023-02-21 | Stop reason: HOSPADM

## 2023-02-07 RX ADMIN — NYSTATIN 500000 UNITS: 100000 SUSPENSION ORAL at 08:10

## 2023-02-07 RX ADMIN — MICONAZOLE NITRATE: 2 POWDER TOPICAL at 08:11

## 2023-02-07 RX ADMIN — HYDROMORPHONE HYDROCHLORIDE 0.2 MG: 0.2 INJECTION, SOLUTION INTRAMUSCULAR; INTRAVENOUS; SUBCUTANEOUS at 10:51

## 2023-02-07 RX ADMIN — PANTOPRAZOLE SODIUM 40 MG: 40 TABLET, DELAYED RELEASE ORAL at 06:36

## 2023-02-07 RX ADMIN — NYSTATIN 500000 UNITS: 100000 SUSPENSION ORAL at 22:01

## 2023-02-07 RX ADMIN — GABAPENTIN 100 MG: 100 CAPSULE ORAL at 08:10

## 2023-02-07 RX ADMIN — POLYETHYLENE GLYCOL 3350 17 G: 17 POWDER, FOR SOLUTION ORAL at 08:43

## 2023-02-07 RX ADMIN — ACETAMINOPHEN 650 MG: 325 TABLET, FILM COATED ORAL at 03:11

## 2023-02-07 RX ADMIN — DULOXETINE HYDROCHLORIDE 120 MG: 60 CAPSULE, DELAYED RELEASE ORAL at 08:11

## 2023-02-07 RX ADMIN — SODIUM CHLORIDE, POTASSIUM CHLORIDE, SODIUM LACTATE AND CALCIUM CHLORIDE 1000 ML: 600; 310; 30; 20 INJECTION, SOLUTION INTRAVENOUS at 13:41

## 2023-02-07 RX ADMIN — ATORVASTATIN CALCIUM 40 MG: 40 TABLET, FILM COATED ORAL at 20:17

## 2023-02-07 RX ADMIN — FILGRASTIM-AAFI 480 MCG: 480 INJECTION, SOLUTION SUBCUTANEOUS at 20:17

## 2023-02-07 RX ADMIN — OXYCODONE HYDROCHLORIDE 2.5 MG: 5 TABLET ORAL at 14:19

## 2023-02-07 RX ADMIN — GABAPENTIN 100 MG: 100 CAPSULE ORAL at 13:41

## 2023-02-07 RX ADMIN — ARIPIPRAZOLE 5 MG: 5 TABLET ORAL at 08:10

## 2023-02-07 RX ADMIN — CEFEPIME HYDROCHLORIDE 2 G: 2 INJECTION, POWDER, FOR SOLUTION INTRAVENOUS at 23:59

## 2023-02-07 RX ADMIN — OXYCODONE HYDROCHLORIDE 2.5 MG: 5 TABLET ORAL at 18:35

## 2023-02-07 RX ADMIN — GABAPENTIN 100 MG: 100 CAPSULE ORAL at 20:17

## 2023-02-07 RX ADMIN — PANTOPRAZOLE SODIUM 40 MG: 40 TABLET, DELAYED RELEASE ORAL at 18:34

## 2023-02-07 RX ADMIN — NYSTATIN 500000 UNITS: 100000 SUSPENSION ORAL at 18:34

## 2023-02-07 RX ADMIN — MULTIPLE VITAMINS W/ MINERALS TAB 1 TABLET: TAB at 08:11

## 2023-02-07 RX ADMIN — HYDROMORPHONE HYDROCHLORIDE 0.2 MG: 0.2 INJECTION, SOLUTION INTRAMUSCULAR; INTRAVENOUS; SUBCUTANEOUS at 20:55

## 2023-02-07 RX ADMIN — ENOXAPARIN SODIUM 40 MG: 40 INJECTION SUBCUTANEOUS at 13:41

## 2023-02-07 RX ADMIN — CEFEPIME HYDROCHLORIDE 2 G: 2 INJECTION, POWDER, FOR SOLUTION INTRAVENOUS at 11:58

## 2023-02-07 RX ADMIN — CEFEPIME HYDROCHLORIDE 2 G: 2 INJECTION, POWDER, FOR SOLUTION INTRAVENOUS at 00:55

## 2023-02-07 RX ADMIN — NYSTATIN 500000 UNITS: 100000 SUSPENSION ORAL at 13:41

## 2023-02-07 RX ADMIN — OXYCODONE HYDROCHLORIDE 2.5 MG: 5 TABLET ORAL at 23:56

## 2023-02-07 RX ADMIN — SENNOSIDES AND DOCUSATE SODIUM 2 TABLET: 50; 8.6 TABLET ORAL at 08:42

## 2023-02-07 RX ADMIN — FOLIC ACID 2 MG: 1 TABLET ORAL at 08:11

## 2023-02-07 ASSESSMENT — ACTIVITIES OF DAILY LIVING (ADL)
ADLS_ACUITY_SCORE: 58
ADLS_ACUITY_SCORE: 60
ADLS_ACUITY_SCORE: 56
ADLS_ACUITY_SCORE: 58
ADLS_ACUITY_SCORE: 60
ADLS_ACUITY_SCORE: 54
ADLS_ACUITY_SCORE: 58
ADLS_ACUITY_SCORE: 56

## 2023-02-07 NOTE — PLAN OF CARE
Goal Outcome Evaluation: [id number]    A&O x4, calm and cooperative. Turn and repo q2. Tolerating mildly thick liquids. Wound cares completed. PO oxycodone x1.  VSS ex fever of 101, 2 sets of blood cultures drawn, Tylenol given. PRN Senna given, last BM 1/27.  Adequate output from purewick. Neutropenic precautions maintained.

## 2023-02-07 NOTE — SIGNIFICANT EVENT
Significant Event Note    Time of event: 2:19 AM February 7, 2023    Description of event:  Paged for Fever of 101. Per nursing staff there have been no other acute clinical status changes thus far this evening.    Plan:  Cefepime continued, repeat blood cultures ordered.    Discussed with: bedside nurse    Gianluca Mera MD

## 2023-02-07 NOTE — PROGRESS NOTES
[facility]    Medicine Progress Note - Hospitalist Service    Date of Admission:  1/28/2023    Assessment & Plan   Shira Rodriguez is a 62-year-old female with rheumatoid arthritis on methotrexate, prior massive PE on warfarin, morbid obesity who presented on 1/28/2023 with severe sepsis.  She was found unresponsive in her home. She  Was hypotensive and  had temp of 100.2 in route to hospital.        Severe sepsis-present on admission  Candida intertrigo  Neutropenic fever  Lactic acidosis, Resolved   * Presented with lactic acidosis of 3.2, hypotension, HR of 105, WBC of 0.4, acute metabolic encephalopathy - met criteria for severe sepsis on admission; resolved with IVF and empiric antibiotics  * likely potential sources include panniculitis (significant redness under abd pannus and breasts) or mucositis (severe throat pain x2 days) and duodenitis with severe neutropenia.  * CT C/A/P showed small chronic left pleural effusion, small fluid around distal esophagus and duodenum  * TTE 1/30 with possible vegetation; LETA 1/31 negative for vegetation or dissection  * repeat CT chest 2/4 (due to cough) with slight increase in bilateral pleural effusions, mild increase in atelectasis   * ID consulted with extensive infectious work-up: positive fungitell (significance unclear), positive EBV IgG (but negative IgM and DNA)  * negative infectious work-up:  UA and culture, blood cultures, COVID/RSV/flu, Hep B&C, HIV, HSV, CMV, aspergillus, Blastomyces, histoplasmosis, anaplasma, Rickettsia, Ehrlichia   * completed five day course doxycycline 2/3 (empiric tx for tick-borne illness)  * completed 7-day course fluconazole 2/6 (for Candida intertrigo)  *     - unclear significance of Beta D glucan at this time, no obvious systemic fungal infection     - given ongoing neutropenia, continue cefepime per ID, recs appreciated  - febrile to 101 overnight, repeat blood cultures drawn; reports subjective chills  and sweats but no new focal infectious symptoms  - per discussion with Heme 2/6/23, once broad spectrum antibiotics stopped, start prophylactic valcyclovir, fluconazole, levofloxacin until neutropenia resolves    ADDENDUM:  Discussed with ID.  Fever may be due to bone marrow recovery.  Recommend continuing cefepime (if otherwise clinically unchanged), to be discontinued once ANC >500.  Signed off 2/7.        Pancytopenia with severe leukopenia and neutropenia, moderate anemia and thrombocytopenia  Severe vitamin B12 deficiency and folate deficiency  * CBC wnl 12/7/2022; On admission 1/28 - Hb 9.4, WBC 0.4, ANC 0.1, platelets 59  * Iron studies 1/29 indicating anemia of chronic disease.  Reticulocyte count suppressed.  B12 undetectable and folate low at 2.8  * initiated on B12 and folate, ultimately started on leucovorin rescue 2/2-2/5 per Heme  * methotrexate level 1/30 undetectable   * Peripheral smear 1/29 showed moderate to marked pancytopenia. Negative for schistocytes and elevated fibrinogen argues against DIC. Negative for circulating blasts on scanning.   * flow cytometry shows polytypic B cells, no aberrant immunophenotype on T cells and rare to absent CD34 positive blasts  * elevated Kappa and lambda free light chains, monoclonal IgM immunoglobulin of lambda light chain type, IgM is elevated at 395 and IgG and IgA are within normal limits   * immunological work-up showed C3 and C4 wnl, ANCA and ds-DNA negative  * received 1U PRBC 1/31 and 2/4  * received daily platelet transfusions since 1/30-2/5 for plt <50K    - per Heme; transfuse for plt <20K or signs of bleeding; hgb <7 g/dl  - initiated on daily Neupogen 1/30 without significant improvement in counts; continue as per Heme  - continue oral folate  - repeat B12 level pending, will need outpatient monitoring  - WBC up to 1.3, plt and hgb stable 02/07/23; monitor daily  - Unclear source at this time of her pancytopenia; s/p bone marrow biopsy on 2/3/2023  and results are pending; preliminary negative for leukemia or aplastic anemia per Heme and suspect methotrexate as etiology, which may take several weeks for bone marrow to recover       KRISS on CKD stage 4, resolved  Mild anion gap metabolic acidosis, resolved   * Normal renal function in 2021, Cr 1.7 5/2022, more recent baseline creatinine 2-2.2.   Creatinine on admission 3.25.    * Nephrology consulted, suspect pre-renal with possible progression to ATN, treated with IVF with gradual improvement.  Neph signed off 2/3.   - Cr up to 2.29 today; will give 1L IVF and repeat in AM     Probable esophagitis  Probable duodenitis  Dysphagia   Mucositis   * she has been complaining of throat pain; oral ulcers presents on exam   * CT on admission showed small amount of fluid adjacent to distal thoracic esophagus and scattered posterior mediastinal lymph nodes along esophagus of uncertain etiology but could be secondary to esophagitis or other inflammatory process.  CT scan also showed small edema around second and third portion of duodenum.  - continue magic mouth wash and nystatin  - SLP following  - continue Protonix  - may need EGD above but waiting to consult GI until would be ok for EGD      Panniculitis  Candidal rash-under pannus, breasts  * treated with fluconazole 1/30-2/6 per ID  - Wound care on board   - continue miconazole powder     Hypokalemia  - continue replacement protocol     Type II MI due to severe sepsis  * High-sensitivity troponin mildly elevated at 48 and repeat was normal and can be due to demand  * TTE 1/30 with normal LV function without WMA, RV with mildly decreased systolic function     History of massive PE causing PEA arrest, 9/2019  * last dose of warfarin was 1/29, but INR has remained therapeutic through 2/5.   - continue to hold anticoagulation with plt <50K per Onc  - started lovenox ppx per Heme 2/6; hold for platelet <20K or signs of bleeding     Rheumatoid arthritis  * Historically on  "methotrexate, resumed about 2 weeks prior to admission due to worsening back and shoulder pain.   -Hold methotrexate, follow up with outpatient rheumatologist      COPD  chronic cough  * has chronic cough for years and follows with pulmonary at [facility]  * CXR 2/3 showed mild opacity at the left lung base is new since the previous exam - atelectasis vs pneumonia.   * CT chest 2/4 with findings as above   - no signs of acute exacerbation     Chronic major depression  Chronic pain  * see recent pain clinic visit summary from 1/17/2023 for background information  - Continue Abilify, Cymbalta and Neurontin     Essential hypertension  PTA lisinopril initially held due to KRISS and hypotension, which have resolved.  - remains normotensive at this time, resume as indicated     Morbid obesity, BMI 46  Estimated body mass index is 45.9 kg/m  as calculated from the following:    Height as of this encounter: 1.676 m (5' 6\").    Weight as of this encounter: 129 kg (284 lb 6.3 oz).     Acute metabolic encephalopathy secondary to severe sepsis, resolved  Admission head CT negative for acute pathology.  Returned to baseline mental status with treatment of sepsis.     Mild hypovolemic hyponatremia, resolved  Admission sodium 132, normalized with IVF.     Hypomagnesemia, resolved  * replaced per protocol    3 cm right adrenal nodule  * Noted on CT.  Was present on previous imaging as well.  Likely benign  * A.m. cortisol 1/29, normal at 43.9    Hypoalbuminemia, albumin 2.5    Hyperuricemia  * uric acid 8.9 on 1/29/23       Diet: Snacks/Supplements Adult: Other; L: gel20, D: magic cup; With Meals  Combination Diet Soft and Bite Sized Diet (level 6); Thin Liquids (level 0) (no straws)    DVT Prophylaxis: Lovenox   Underwood Catheter: Not present  Lines: None     Cardiac Monitoring: None  Code Status: Full Code      Clinically Significant Risk Factors              # Hypoalbuminemia: Lowest albumin = 2.5 g/dL at 1/28/2023 10:21 PM, will monitor " "as appropriate   # Thrombocytopenia: Lowest platelets = 40 in last 2 days, will monitor for bleeding         # Severe Obesity: Estimated body mass index is 48.39 kg/m  as calculated from the following:    Height as of this encounter: 1.676 m (5' 6\").    Weight as of this encounter: 136 kg (299 lb 13.2 oz).          Disposition Plan      Expected Discharge Date: 02/09/2023                  Kirby Sahni MD  Hospitalist Service  [facility]  Securely message with [employer] (more info)  Text page via [employer] Paging/Directory   ______________________________________________________________________    Interval History   Reports chills and sweats overnight.  No dyspnea, cough, headache, sore throat, myalgias, arthralgias, nausea, diarrhea, abdominal pain or other new symptoms.  Discussed goals for discharge.        Physical Exam   Vital Signs: Temp: 98.9  F (37.2  C) Temp src: Oral BP: 117/55 Pulse: 87   Resp: 14 SpO2: 95 % O2 Device: None (Room air)    Weight: 299 lbs 13.21 oz    General Appearance: Obese female in NAD  Respiratory: lungs CTAB, no wheezes or crackles  Cardiovascular: RRR ,normal s1/s2 without murmur  GI: abdomen soft, normal bowel sounds  Skin: abdominal fold erythema stable  Other: Alert and appropriate, cranial nerves grossly intact      Medical Decision Making       40 MINUTES SPENT BY ME on the date of service doing chart review, history, exam, documentation & further activities per the note.  MANAGEMENT DISCUSSED with the following over the past 24 hours: bedside RN       Data     I have personally reviewed the following data over the past 24 hrs:    1.3 (L)  \   7.1 (L)   / 40 (LL)     N/A N/A N/A /  N/A   3.5 N/A 2.29 (H) \       INR:  1.77 (H) PTT:  N/A   D-dimer:  N/A Fibrinogen:  N/A       "

## 2023-02-07 NOTE — PLAN OF CARE
[id number]: VSS on RA. Most of pain from wound cares- IV dilaudid given as pre-med and then required oxycodone later. Mucositis pain improving. SLP following- increased to small and bite sized with mildly thick liquids- ate 2 meals today. PIV SL. Purewick in place with good UOP. Per patient, no BM since 1/27- given miralax and senna. Pt passing more gas and sat on the BSC. Stood at bedside with OT this afternoon w/ em steady. Turned/repositioned. Wound cares completed at 1600, skin with scattered bruising and petechiae as well. K WNL. Plt increased to 48. WBC 0.6. Plan to get up to chair tomorrow and to continue monitoring labs/pain- PT/OT recommend TCU at discharge.

## 2023-02-07 NOTE — PLAN OF CARE
MD Notification    Notified Person: MD    Notified Person Name:  Gianluca Mera    Notification Date/Time: 2/7/2023 @ 0212    Notification Interaction: page via [employer]    Purpose of Notification: 8825- S.S. Pt has temp of 101. Admitted with pancytopenia. Blood cultures? Already on Cefepime. Order to page if >100.4. Thanks     Orders Received: 2 sets of peripheral blood cultures ordered stat.     Comments:

## 2023-02-07 NOTE — PROGRESS NOTES
[facility] Nurse Inpatient Assessment     Consulted for: Abdomen, bilateral breast, umbilicus, groin, right popliteal, gluteal folds    Patient History (according to provider note(s):      62-year-old female with rheumatoid arthritis on methotrexate, prior massive PE on warfarin, morbid obesity who presented on 1/28/2023 with severe sepsis.  She was found unresponsive in her home.  Was hypotensive, had temp of 100.2 in route to hospital.      Severe sepsis-present on admission  Neutropenic fever  Panniculitis   Candidal rash-under pannus, breasts    Areas Assessed:      Areas visualized during today's visit: Skin folds     Skin Injury Location: Bilateral breast, bilateral groin, pannus, umbilicus, right popliteal, gluteal fold  2-7-23 2-2-23 right breast      1/30/23 Right breast          2/7/23 2-2-23 left breast      1-30-23 Left Breast        2/7/23 mid pannus          2-2-23 1-30-23 Right abdomen      2/7/23 Left pannus          1-30-23 Left abdomen        1-30-23 Umbilicus      2/7/23right knee crease          2-2-23 right knee crease (medial popliteal)      1-30-23 Right medial popliteal      2-2-23 gluteal fold        Skin injury due to: Fungal rash  and Intertrigo  Skin history and plan of care:  Patient on disability, patient has no assistance at home. Poor hygiene. Arrives with rashes and denudement within multiple skin folds. Many areas in pannus are evolving with large open ulcerations that are extremely painful       Skin assessment: Denudement, Erosion of epidermis, Lesions-satellite and Rash     Measurements (length x width x depth, in cm) Right breast  approx 10 cm x 30 cm x 0.1cm, Left breast 0.7cm x 8cm x 0.1cm Umblicus 3.2cm x 0.5cm x 0.1cm( but into umbilical crease). Pannus/Groin/Mons Pubis wound extends full width of abdomen approximately 75 cm, length runs from abdomen through mons pubis and bilateral groin approximately 25 cm with a depth  of 0.1cm. Right popliteal  Approximately 5cm x 10cm x 0.1cm      Color: pink and red     Temperature  normal      Drainage: moderate     Color: serous and serosanguinous      Odor: mild  Pain: moderate to severe, burning  Pain interventions prior to dressing change: slow and gentle cares and pain meds given after when available  Treatment goal: Heal , Infection control/prevention and Decrease moisture  STATUS: very slight improvement in most areas  Supplies ordered: supplies stored on unit, discussed with RN and discussed with patient       Treatment Plan:     Bilateral breast, Umbilicus, Pannus, Bilateral Groin, Right popliteal, gluteal cleft wound(s): BID (consider premedicating).  Note: need 2 people for adequate access and visualization of folds.   1. Cleanse wound with Vashe #([id number]) soaked gauze. Soak gauze and allow to sit in wound bed for 10 minutes then remove.  2. Cut piece of Xeroform Gauze and lay flat over open areas to BL breast, umbillicus, Pannus, Groin, Right posterior knee  3. Cover umbillicus, BL elbows and Right knee with Mepilex.   4. Apply White dry wash cloth over xeroform to breasts, pannus and groin (these white cloths should be changed 3-4x/day but ok to leave xeroform in place and change xeroform 2x daily.  4. Keep HOB as low as tolerated to decrease pressure/friction of skin folds rubbing together.      If minimal improvement over next few days will consider adding Vashe soaks to affected areas.      Orders: Reviewed and Updated    RECOMMEND PRIMARY TEAM ORDER: None, at this time    Education provided: plan of care, wound progress, Moisture management and Hygiene  Discussed plan of care with: Patient and Nurse  WOC nurse follow-up plan: twice weekly for now and prn  Notify WOC if wound(s) deteriorate.  Nursing to notify the Provider(s) and re-consult the WOC Nurse if new skin concern.    DATA:     Current support surface: Standard  Standard gel/foam mattress (IsoFlex, Atmos air,  etc)  Containment of urine/stool: Incontinent pad in bed and Purewick external catheter   BMI: Body mass index is 48.39 kg/m .   Active diet order: Orders Placed This Encounter      Combination Diet Soft and Bite Sized Diet (level 6); Thin Liquids (level 0) (no straws)     Output: I/O last 3 completed shifts:  In: 480 [P.O.:480]  Out: 1650 [Urine:1650]     Labs:   Recent Labs   Lab 02/07/23  0300   HGB 7.1*   INR 1.77*   WBC 1.3*     Pressure injury risk assessment:   Sensory Perception: 3-->slightly limited  Moisture: 2-->very moist  Activity: 2-->chairfast  Mobility: 2-->very limited  Nutrition: 4-->excellent  Friction and Shear: 1-->problem  Ole Score: 14    Kevin Quinones RN CWOCN   Dept. Vocera- Contact [facility] Nurse (Raoul) via  Vocera   Dept. Office Number: [phone]

## 2023-02-07 NOTE — PROGRESS NOTES
WOC Assessment    S:   WOC nurse performed follow up assessment with patient and coordinating with nursing.    B:   62-year-old female with rheumatoid arthritis on methotrexate, prior massive PE on warfarin, morbid obesity who presented on 1/28/2023 with severe sepsis.  She was found unresponsive in her home.  Was hypotensive, had temp of 100.2 in route to hospital.      Severe sepsis-present on admission  Neutropenic fever  Panniculitis   Candidal rash-under pannus, breasts    A:  Abdomen, bilateral breasts, umbilicus, groin, right popliteal all showing very slow improvement. Areas of pannus are evolving with large ulcerations and would benefit a contact layer to promote wound healing and gentle wound cares. Supplies gathered and brought to bedside. WOC and RN had coordinated time, but due to a new admission and high census we were not able to meet. WOC and RN then regrouped at end of the day and created plan. Wound care updated.     R:   WOC and RN decided it would be best to coordinate visit tomorrow 2/7 to fully visualize and obtain pictures of sites.       Kevin Quinones RN CWOCN   Dept. Vocera- Contact WO Nurse (Raoul) via  [employer]oc[employer]   Dept. Office Number: [phone]

## 2023-02-07 NOTE — PROGRESS NOTES
[facility]    Infectious Disease Progress Note    Date of Service : 02/07/2023       Assessment:  62 YF with RA on Methotrexate (recently transitioned from subcutaneous dosing to PO), morbid obesity , hx of PEA cardiac arrest due to PE who has been hospitalized with progressive fatigue, was found down and is noted to have severe pancytopenia as well as lactic acidosis related to neutropenic fever. She has significant candida intertrigo infection in the inframammary and groin folds. No bacteremia or other focus of infection found thus far except for mild periumbilical cellulitis which has improved. She had severe B12/folate deficiency which has been corrected. Bone marrow studies suggestive of Methotrexate toxicity     -Pancytopenia likely related to Methotrexate toxicity   -Neutropenic fever resolved  -B12/folate deficiency  -Mild periumbilical cellulitis improved, and left ankle erythema without tenderness  -Fungal intertrigo infection in inframammary and groin skin folds  -KRISS   -Lactic acidosis resolved  -Rheumatoid arthritis - on Methotrexate thrice/week -on hold  -Hx of PE with PEA cardiac arrest requiring ECMO  -Chronic left pleural effusion  -Morbid obesity     Recommendations  1. Bone marrow is recovering  2. Likely discontinue Cefepime in 1-2 days as WBC count recovers -day 11 of antibiotics today  3. Supportive care    No additional recommendations, ID will sign off. Please call us back as needed      Scarlett Zhu MD    Interval History   Feels the same, no new complaints, had a fever last night , afebrile now    Physical Exam   Temp: 98.9  F (37.2  C) Temp src: Oral BP: 117/55 Pulse: 87   Resp: 14 SpO2: 95 % O2 Device: None (Room air)    Vitals:    02/05/23 0618 02/06/23 0630 02/07/23 0638   Weight: 136.5 kg (301 lb) 136.5 kg (301 lb) 136 kg (299 lb 13.2 oz)     Vital Signs with Ranges  Temp:  [97.4  F (36.3  C)-101  F (38.3  C)] 98.9  F (37.2  C)  Pulse:  [] 87  Resp:   [14-18] 14  BP: ()/(46-61) 117/55  SpO2:  [93 %-96 %] 95 %    Constitutional: Awake, alert, cooperative, chronically ill appearing  Lungs: clear  Cardiovascular: S1S2, murmur  Abdomen: obese, periumbilical hernia with surrounding cellulitis improved,   Skin: crusting and fissuring in skin folds, intertrigo in groin, inframammary and popliteal skin folds  MS : L ankle erythema stable, no warmth and no tenderness    Other:    Medications     - MEDICATION INSTRUCTIONS -       - MEDICATION INSTRUCTIONS -       Warfarin Therapy Reminder         ARIPiprazole  5 mg Oral Daily     atorvastatin  40 mg Oral QPM     ceFEPIme  2 g Intravenous Q12H     DULoxetine  120 mg Oral Daily     enoxaparin ANTICOAGULANT  40 mg Subcutaneous Q24H     filgrastim-aafi  480 mcg Subcutaneous Daily at 8 pm     folic acid  2 mg Oral Daily     gabapentin  100 mg Oral TID     lactated ringers  1,000 mL Intravenous Once     miconazole   Topical BID     multivitamin w/minerals  1 tablet Oral Daily     nystatin  500,000 Units Swish & Spit 4x Daily     pantoprazole  40 mg Oral BID AC     sodium chloride (PF)  3 mL Intracatheter Q8H     warfarin-No DOSE today  1 each Does not apply no dose today (warfarin)       Data   All microbiology laboratory data reviewed.  Recent Labs   Lab Test 02/07/23  0300 02/06/23  0554 02/06/23  0210 02/05/23  0949   WBC 1.3*  --  0.6* 0.4*   HGB 7.1*  --  7.6* 7.5*   HCT 21.4*  --  24.1* 23.3*   MCV 88  --  91 90   PLT 40* 48*  --  31*     Recent Labs   Lab Test 02/07/23  0300 02/05/23  0922 02/04/23  0928   CR 2.29* 1.95* 1.94*     Recent Labs   Lab Test 01/13/21  1419   SED 15     Imaging  2/4 Ct chest WO contrast  EXAM: CT CHEST WITHOUT CONTRAST  LOCATION: [facility]  DATE/TIME: 02/04/2023, 2:35 PM     INDICATION: Neutropenia. Cough.  COMPARISON: Chest CT performed 01/29/2023.  TECHNIQUE: CT chest without IV contrast. Multiplanar reformats were obtained. Dose reduction techniques were  used.  CONTRAST: None.     FINDINGS:   LUNGS AND PLEURA: A small left pleural effusion has increased slightly in size. A small right pleural effusion is new since the previous exam. There is mild atelectasis at both lung bases posteriorly, also increased slightly. A 0.3 cm right upper lobe   pulmonary nodule (series 4 image 114) is unchanged. No lung masses are identified. No pneumothorax.     MEDIASTINUM/AXILLAE: No enlarged lymph nodes in the chest. No pericardial effusion. Mild atherosclerotic calcification of the thoracic aorta.     CORONARY ARTERY CALCIFICATION: Mild.     UPPER ABDOMEN: Small hiatal hernia. Indeterminate right adrenal nodule is unchanged, measuring 3 cm.     MUSCULOSKELETAL: Degenerative changes in the thoracic spine.                                                                      IMPRESSION:   1.  Small bilateral pleural effusions, increased slightly in size on the left and new on the right.  2.  Mild atelectasis at both lung bases posteriorly has also increased slightly.

## 2023-02-08 ENCOUNTER — APPOINTMENT (OUTPATIENT)
Dept: PHYSICAL THERAPY | Facility: CLINIC | Age: 63
DRG: 871 | End: 2023-02-08
Payer: COMMERCIAL

## 2023-02-08 ENCOUNTER — APPOINTMENT (OUTPATIENT)
Dept: OCCUPATIONAL THERAPY | Facility: CLINIC | Age: 63
DRG: 871 | End: 2023-02-08
Payer: COMMERCIAL

## 2023-02-08 ENCOUNTER — APPOINTMENT (OUTPATIENT)
Dept: SPEECH THERAPY | Facility: CLINIC | Age: 63
DRG: 871 | End: 2023-02-08
Payer: COMMERCIAL

## 2023-02-08 LAB
ALBUMIN MFR UR ELPH: 68.4 MG/DL (ref 1–14)
ALBUMIN UR-MCNC: 100 MG/DL
ANION GAP SERPL CALCULATED.3IONS-SCNC: 13 MMOL/L (ref 7–15)
APPEARANCE UR: CLEAR
ATRIAL RATE - MUSE: 90 BPM
BACTERIA #/AREA URNS HPF: ABNORMAL /HPF
BASOPHILS # BLD MANUAL: 0 10E3/UL (ref 0–0.2)
BASOPHILS NFR BLD MANUAL: 0 %
BILIRUB UR QL STRIP: NEGATIVE
BLASTS # BLD MANUAL: 0.2 10E3/UL
BLASTS NFR BLD MANUAL: 3 %
BUN SERPL-MCNC: 40.8 MG/DL (ref 8–23)
CALCIUM SERPL-MCNC: 8.1 MG/DL (ref 8.8–10.2)
CHLORIDE SERPL-SCNC: 102 MMOL/L (ref 98–107)
CK SERPL-CCNC: 23 U/L (ref 26–192)
COLOR UR AUTO: ABNORMAL
CREAT SERPL-MCNC: 2.49 MG/DL (ref 0.51–0.95)
CREAT UR-MCNC: 47 MG/DL
CYSTATIN C (ROCHE): 3.4 MG/L (ref 0.6–1)
DEPRECATED HCO3 PLAS-SCNC: 24 MMOL/L (ref 22–29)
DIASTOLIC BLOOD PRESSURE - MUSE: NORMAL MMHG
EOSINOPHIL # BLD MANUAL: 0.1 10E3/UL (ref 0–0.7)
EOSINOPHIL NFR BLD MANUAL: 2 %
ERYTHROCYTE [DISTWIDTH] IN BLOOD BY AUTOMATED COUNT: 13.6 % (ref 10–15)
GFR SERPL CREATININE-BSD FRML MDRD: 14 ML/MIN/1.73M2
GFR SERPL CREATININE-BSD FRML MDRD: 21 ML/MIN/1.73M2
GLIADIN IGA SER-ACNC: 0.8 U/ML
GLIADIN IGG SER-ACNC: <0.6 U/ML
GLUCOSE SERPL-MCNC: 83 MG/DL (ref 70–99)
GLUCOSE UR STRIP-MCNC: NEGATIVE MG/DL
HCT VFR BLD AUTO: 23 % (ref 35–47)
HGB BLD-MCNC: 7.5 G/DL (ref 11.7–15.7)
HGB UR QL STRIP: ABNORMAL
INR PPP: 1.62 (ref 0.85–1.15)
INTERPRETATION ECG - MUSE: NORMAL
INTERPRETATION: NORMAL
KETONES UR STRIP-MCNC: NEGATIVE MG/DL
LEUKOCYTE ESTERASE UR QL STRIP: NEGATIVE
LYMPHOCYTES # BLD MANUAL: 1.7 10E3/UL (ref 0.8–5.3)
LYMPHOCYTES NFR BLD MANUAL: 27 %
MCH RBC QN AUTO: 29.3 PG (ref 26.5–33)
MCHC RBC AUTO-ENTMCNC: 32.6 G/DL (ref 31.5–36.5)
MCV RBC AUTO: 90 FL (ref 78–100)
METAMYELOCYTES # BLD MANUAL: 0.4 10E3/UL
METAMYELOCYTES NFR BLD MANUAL: 6 %
MONOCYTES # BLD MANUAL: 0.8 10E3/UL (ref 0–1.3)
MONOCYTES NFR BLD MANUAL: 13 %
MYELOCYTES # BLD MANUAL: 0.1 10E3/UL
MYELOCYTES NFR BLD MANUAL: 2 %
NEUTROPHILS # BLD MANUAL: 2.8 10E3/UL (ref 1.6–8.3)
NEUTROPHILS NFR BLD MANUAL: 45 %
NITRATE UR QL: NEGATIVE
P AXIS - MUSE: 70 DEGREES
PH UR STRIP: 5.5 [PH] (ref 5–7)
PLAT MORPH BLD: ABNORMAL
PLATELET # BLD AUTO: 50 10E3/UL (ref 150–450)
POTASSIUM SERPL-SCNC: 3.2 MMOL/L (ref 3.4–5.3)
POTASSIUM SERPL-SCNC: 3.5 MMOL/L (ref 3.4–5.3)
PR INTERVAL - MUSE: 152 MS
PROMYELOCYTES # BLD MANUAL: 0.1 10E3/UL
PROMYELOCYTES NFR BLD MANUAL: 2 %
PROT/CREAT 24H UR: 1.46 MG/MG CR (ref 0–0.2)
QRS DURATION - MUSE: 88 MS
QT - MUSE: 378 MS
QTC - MUSE: 462 MS
R AXIS - MUSE: 50 DEGREES
RBC # BLD AUTO: 2.56 10E6/UL (ref 3.8–5.2)
RBC MORPH BLD: ABNORMAL
RBC URINE: 93 /HPF
SODIUM SERPL-SCNC: 139 MMOL/L (ref 136–145)
SODIUM UR-SCNC: 64 MMOL/L
SP GR UR STRIP: 1.01 (ref 1–1.03)
SQUAMOUS EPITHELIAL: 1 /HPF
SYSTOLIC BLOOD PRESSURE - MUSE: NORMAL MMHG
T AXIS - MUSE: 26 DEGREES
TTG IGA SER-ACNC: 0.9 U/ML
TTG IGG SER-ACNC: <0.6 U/ML
UROBILINOGEN UR STRIP-MCNC: NORMAL MG/DL
VENTRICULAR RATE- MUSE: 90 BPM
WBC # BLD AUTO: 6.2 10E3/UL (ref 4–11)
WBC URINE: 2 /HPF

## 2023-02-08 PROCEDURE — 250N000013 HC RX MED GY IP 250 OP 250 PS 637: Performed by: NURSE PRACTITIONER

## 2023-02-08 PROCEDURE — 92526 ORAL FUNCTION THERAPY: CPT | Mod: GN

## 2023-02-08 PROCEDURE — 250N000011 HC RX IP 250 OP 636: Performed by: PHYSICIAN ASSISTANT

## 2023-02-08 PROCEDURE — 81003 URINALYSIS AUTO W/O SCOPE: CPT | Performed by: HOSPITALIST

## 2023-02-08 PROCEDURE — 85610 PROTHROMBIN TIME: CPT | Performed by: HOSPITALIST

## 2023-02-08 PROCEDURE — 250N000013 HC RX MED GY IP 250 OP 250 PS 637: Performed by: INTERNAL MEDICINE

## 2023-02-08 PROCEDURE — 120N000001 HC R&B MED SURG/OB

## 2023-02-08 PROCEDURE — 99232 SBSQ HOSP IP/OBS MODERATE 35: CPT | Performed by: INTERNAL MEDICINE

## 2023-02-08 PROCEDURE — 36415 COLL VENOUS BLD VENIPUNCTURE: CPT | Performed by: HOSPITALIST

## 2023-02-08 PROCEDURE — 85027 COMPLETE CBC AUTOMATED: CPT | Performed by: HOSPITALIST

## 2023-02-08 PROCEDURE — 84156 ASSAY OF PROTEIN URINE: CPT | Performed by: STUDENT IN AN ORGANIZED HEALTH CARE EDUCATION/TRAINING PROGRAM

## 2023-02-08 PROCEDURE — 97110 THERAPEUTIC EXERCISES: CPT | Mod: GP

## 2023-02-08 PROCEDURE — 85007 BL SMEAR W/DIFF WBC COUNT: CPT | Performed by: HOSPITALIST

## 2023-02-08 PROCEDURE — 250N000013 HC RX MED GY IP 250 OP 250 PS 637: Performed by: HOSPITALIST

## 2023-02-08 PROCEDURE — 97530 THERAPEUTIC ACTIVITIES: CPT | Mod: GP

## 2023-02-08 PROCEDURE — 82610 CYSTATIN C: CPT | Performed by: STUDENT IN AN ORGANIZED HEALTH CARE EDUCATION/TRAINING PROGRAM

## 2023-02-08 PROCEDURE — 82550 ASSAY OF CK (CPK): CPT | Performed by: STUDENT IN AN ORGANIZED HEALTH CARE EDUCATION/TRAINING PROGRAM

## 2023-02-08 PROCEDURE — 99233 SBSQ HOSP IP/OBS HIGH 50: CPT | Performed by: HOSPITALIST

## 2023-02-08 PROCEDURE — 84300 ASSAY OF URINE SODIUM: CPT | Performed by: HOSPITALIST

## 2023-02-08 PROCEDURE — 99232 SBSQ HOSP IP/OBS MODERATE 35: CPT | Performed by: STUDENT IN AN ORGANIZED HEALTH CARE EDUCATION/TRAINING PROGRAM

## 2023-02-08 PROCEDURE — 82310 ASSAY OF CALCIUM: CPT | Performed by: HOSPITALIST

## 2023-02-08 PROCEDURE — 97110 THERAPEUTIC EXERCISES: CPT | Mod: GO

## 2023-02-08 PROCEDURE — 84132 ASSAY OF SERUM POTASSIUM: CPT | Performed by: HOSPITALIST

## 2023-02-08 RX ORDER — POTASSIUM CHLORIDE 1500 MG/1
40 TABLET, EXTENDED RELEASE ORAL ONCE
Status: COMPLETED | OUTPATIENT
Start: 2023-02-08 | End: 2023-02-08

## 2023-02-08 RX ORDER — HEPARIN SODIUM 5000 [USP'U]/.5ML
5000 INJECTION, SOLUTION INTRAVENOUS; SUBCUTANEOUS EVERY 8 HOURS
Status: DISCONTINUED | OUTPATIENT
Start: 2023-02-09 | End: 2023-02-09

## 2023-02-08 RX ADMIN — PANTOPRAZOLE SODIUM 40 MG: 40 TABLET, DELAYED RELEASE ORAL at 17:15

## 2023-02-08 RX ADMIN — MULTIPLE VITAMINS W/ MINERALS TAB 1 TABLET: TAB at 08:35

## 2023-02-08 RX ADMIN — NYSTATIN 500000 UNITS: 100000 SUSPENSION ORAL at 08:35

## 2023-02-08 RX ADMIN — ATORVASTATIN CALCIUM 40 MG: 40 TABLET, FILM COATED ORAL at 20:00

## 2023-02-08 RX ADMIN — GABAPENTIN 100 MG: 100 CAPSULE ORAL at 20:00

## 2023-02-08 RX ADMIN — NYSTATIN 500000 UNITS: 100000 SUSPENSION ORAL at 21:10

## 2023-02-08 RX ADMIN — PANTOPRAZOLE SODIUM 40 MG: 40 TABLET, DELAYED RELEASE ORAL at 06:26

## 2023-02-08 RX ADMIN — HYDROMORPHONE HYDROCHLORIDE 0.2 MG: 0.2 INJECTION, SOLUTION INTRAMUSCULAR; INTRAVENOUS; SUBCUTANEOUS at 21:05

## 2023-02-08 RX ADMIN — NYSTATIN 500000 UNITS: 100000 SUSPENSION ORAL at 14:55

## 2023-02-08 RX ADMIN — OXYCODONE HYDROCHLORIDE 2.5 MG: 5 TABLET ORAL at 14:55

## 2023-02-08 RX ADMIN — OXYCODONE HYDROCHLORIDE 2.5 MG: 5 TABLET ORAL at 20:00

## 2023-02-08 RX ADMIN — DULOXETINE HYDROCHLORIDE 120 MG: 60 CAPSULE, DELAYED RELEASE ORAL at 08:35

## 2023-02-08 RX ADMIN — NYSTATIN 500000 UNITS: 100000 SUSPENSION ORAL at 17:15

## 2023-02-08 RX ADMIN — HYDROMORPHONE HYDROCHLORIDE 0.2 MG: 0.2 INJECTION, SOLUTION INTRAMUSCULAR; INTRAVENOUS; SUBCUTANEOUS at 10:21

## 2023-02-08 RX ADMIN — ARIPIPRAZOLE 5 MG: 5 TABLET ORAL at 08:35

## 2023-02-08 RX ADMIN — POTASSIUM CHLORIDE 40 MEQ: 1500 TABLET, EXTENDED RELEASE ORAL at 08:35

## 2023-02-08 RX ADMIN — OXYCODONE HYDROCHLORIDE 2.5 MG: 5 TABLET ORAL at 06:27

## 2023-02-08 RX ADMIN — FOLIC ACID 2 MG: 1 TABLET ORAL at 08:34

## 2023-02-08 RX ADMIN — GABAPENTIN 100 MG: 100 CAPSULE ORAL at 08:34

## 2023-02-08 RX ADMIN — GABAPENTIN 100 MG: 100 CAPSULE ORAL at 14:55

## 2023-02-08 ASSESSMENT — ACTIVITIES OF DAILY LIVING (ADL)
ADLS_ACUITY_SCORE: 56

## 2023-02-08 NOTE — PROGRESS NOTES
Service Date: 02/07/2023    SUBJECTIVE:  Ms. Rodriguez is a 62-year-old female with pancytopenia.  Bone marrow biopsy result is pending.  I have discussed the case with pathologist.  No evidence of MDS or leukemia.  Bone marrow is hypocellular.  This is likely result of methotrexate, which she was taking for rheumatoid arthritis.  The patient also has vitamin B12 and folate deficiency contributing to cytopenia.    The patient is on Neupogen.  Finally, her WBC started to improve and today it is 1.3.  Her anemia is stable with hemoglobin around 7.  Thrombocytopenia is stable with platelets around 40.    The patient is very weak.  That is a major problem.  The patient had a temperature of 101 early morning today.  ID is following.  She is on cefepime.    The patient feels weak.  No headache.  No dizziness.  No chest pain.  No worsening shortness of breath.  No nausea or vomiting.  Appetite is fair.  No urinary or bowel complaint.  No bleeding.    She has some pain in the throat.  That is better.    PHYSICAL EXAMINATION:    GENERAL:  She is alert, oriented x 3.  She looked weak.  Not in distress.  Rest of systems not examined.    LABS:  Reviewed.    ASSESSMENT:     1.  A 62-year-old female with pancytopenia secondary to hypocellular marrow.  Other contributing factors are vitamin B12 deficiency and folate deficiency.  2.  Hypocellular marrow secondary to methotrexate.  3.  Rheumatoid arthritis.  Methotrexate on hold.  4.  Vitamin B12 deficiency.  5.  Folate deficiency.  6.  Pulmonary embolism.  Anticoagulation on hold because of thrombocytopenia.  7.  Renal insufficiency.  8.  Neutropenic fever.    PLAN:     1.  The patient's overall condition is stable.  CBC was reviewed with her.  Her leukopenia/neutropenia is improving.  She will be continued on Neupogen. The patient's anemia and thrombocytopenia is stable.  Transfusion support will be done as needed.  2.  The patient's pancytopenia is secondary to hypocellular from  methotrexate.  I am hoping that the next 1-2 weeks her counts should be much better.  3.  The patient is neutropenic, which is improving.  She had 1 episode of fever.  She is on cefepime, which will be continued.  She does not look septic.  4.  She has some sore throat that is improving.  Magic Mouthwash will be used as needed.  5.  The patient had pulmonary embolism.  She was on warfarin, which is on hold.  She is currently on prophylactic Lovenox.  Once her platelet remains above 50, therapeutic anticoagulation can be done.  6.  The patient had a few questions, which were all answered.  Hematology/Oncology will continue to follow.    TOTAL TIME SPENT:  25 minutes.  Time spent in today's visit, review of chart/investigations today and documentation today.    Priscilla Saab MD        D: 2023   T: 2023   MT: FABIAN    Name:     XAVIER ROWELL  MRN:      [id number]        Account:      [id number]   :      1960           Service Date: 2023       Document: [id number]

## 2023-02-08 NOTE — PLAN OF CARE
Goal Outcome Evaluation: 1930-0700    A&O x4, calm and cooperative. Turn and repo q2.  VSS on RA, radial pulse regular. Pre-medicated with IV Dilaudid x1 for full dressing change. Oxy given x1. Purewick in place. PIV SL with intermittent antibiotic.

## 2023-02-08 NOTE — PLAN OF CARE
[id number]: Afebrile. VSS on RA ex irregular radial pulse- MD notified and EKG showed SR w/ PACs- no further intervention per MD. LR bolus infusing. SLP changed diet to thin liquids with bite sized pieces. No complaints of mouth pain. Pre-medicated with dilaudid for wound cares- done at 1130 per WOC's new orders. Oxy required x2. Purewick in place. New vaginal bleeding noted- MD aware and wants to continue to monitor before discontinuing lovenox. Stool softeners effective- large soft BM this AM and then another large loose incontinent stool this afternoon. Up A2, lift. Up to chair with lift for dinner. Continue to monitor labs.

## 2023-02-08 NOTE — PROGRESS NOTES
Renal Medicine Progress Note            Assessment/Plan:     Assessment: Shira Rodriguez is a 61 yo female with PMH RA on methotrexate, morbid obesity, h/o PEA arrest due to PE admitted after being found down, found to have severe pancytopenia, intertrigo, severe sepsis, and KRISS.     KRISS   Cr normal as of 2021, notably 1.7 in May 2022 and recently 2.1. KRISS earlier in admission thought to be prerenal KRISS with probable progression to ATN. Cr had improved to a presley of  1.8. Significant Cr jump starting 2/7. No IV contrast, no hypotensive events. UOP has remained good. Only new med was lovenox 40mg daily started 2/6. Though this is a small dose, it's possible that her Cr is overestimating her true renal function, perhaps this dose is too high. No other new or nephrotoxic meds, though did have dose increase in fluconazole in days leading up to its completion (seems unlikely this caused KRISS). Will trial stopping lovenox to see if any improvement. Will also compare Cr with cystatin C to see if there is any discrepancy in renal function.   - hold lovenox, switch to heparin   - renally dose meds   - cystatin C added to AM labs   - I/Os  - daily BMP     Pancytopenia   Hematology consulted, has undergone bone marrow bx without clear etiology besides methotrexate BM suppression. Is getting neupogen.     Neutropenic fever, resolved  ID following, completing course of cefepime.    Intertrigo   Completed fluconazole course.      Metabolic acidosis, resolved   Lactic acidosis, resolved, resolved    Mild hypokalemia  Replace prn.        Interval History:     - no events overnight   - good appetite, reports it's improved from previous   - denies n/v or dysgeusia  - denies SOB  - denies LE edema   - has been trying to stay well hydrated   - purewick in place with good UOP, ~2L/d  - 1L LR bolus yesterday   - lovenox started 2/6            Medications and Allergies:       ARIPiprazole  5 mg Oral Daily     atorvastatin  40 mg Oral  "QPM     DULoxetine  120 mg Oral Daily     enoxaparin ANTICOAGULANT  40 mg Subcutaneous Q24H     filgrastim-aafi  480 mcg Subcutaneous Daily at 8 pm     folic acid  2 mg Oral Daily     gabapentin  100 mg Oral TID     multivitamin w/minerals  1 tablet Oral Daily     nystatin  500,000 Units Swish & Spit 4x Daily     pantoprazole  40 mg Oral BID AC     sodium chloride (PF)  3 mL Intracatheter Q8H     warfarin-No DOSE today  1 each Does not apply no dose today (warfarin)        Allergies   Allergen Reactions     Adhesive Tape Blisters     Erythromycin Rash            Physical Exam:   Vitals were reviewed  BP 97/56 (BP Location: Right arm)   Pulse 82   Temp 98.8  F (37.1  C) (Axillary)   Resp 18   Ht 1.676 m (5' 6\")   Wt 136.5 kg (301 lb)   SpO2 93%   BMI 48.58 kg/m      Wt Readings from Last 3 Encounters:   02/08/23 136.5 kg (301 lb)   01/17/23 129.3 kg (285 lb)   12/06/22 129.3 kg (285 lb)       Intake/Output Summary (Last 24 hours) at 2/8/2023 1153  Last data filed at 2/8/2023 1113  Gross per 24 hour   Intake --   Output 1775 ml   Net -1775 ml       GENERAL APPEARANCE: no acute distress, resting comfortably in bed  HEENT: MMM  RESP: clear  CV: RRR  EXTREMITIES/SKIN: no LE edema, multiple bruises   NEURO:  Alert, oriented, normal speech           Data:     BMP  Recent Labs   Lab 02/08/23  0703 02/07/23  0300 02/06/23  0210 02/05/23  2041 02/05/23  0922 02/04/23  1542 02/04/23  0928 02/03/23  1522 02/03/23  0734     --   --   --  142  --  144  --  144   POTASSIUM 3.2* 3.5 3.9 3.4 3.1*   < > 3.2*   < > 3.0*   CHLORIDE 102  --   --   --  107  --  108*  --  110*   MARIA INES 8.1*  --   --   --  8.2*  --  8.5*  --  7.9*   CO2 24  --   --   --  24  --  25  --  25   BUN 40.8*  --   --   --  43.9*  --  44.5*  --  42.4*   CR 2.49* 2.29*  --   --  1.95*  --  1.94*  --  1.83*   GLC 83  --   --   --  89  --  77  --  96    < > = values in this interval not displayed.     CBC  Recent Labs   Lab 02/08/23  0703 02/07/23  0300 " 02/06/23  0554 02/06/23  0210 02/05/23  0949   WBC 6.2 1.3*  --  0.6* 0.4*   HGB 7.5* 7.1*  --  7.6* 7.5*   HCT 23.0* 21.4*  --  24.1* 23.3*   MCV 90 88  --  91 90   PLT 50* 40* 48*  --  31*     Lab Results   Component Value Date    AST 28 01/28/2023    ALT 13 01/28/2023    ALKPHOS 57 01/28/2023    BILITOTAL 0.7 01/28/2023    BILICONJ 0.0 12/30/2008     Lab Results   Component Value Date    INR 1.62 (H) 02/08/2023       Attestation:  I have reviewed today's vital signs, notes, medications, labs and imaging.    Juan Lott MD  [facility] - Nephrology  Office: [phone]

## 2023-02-08 NOTE — PROGRESS NOTES
[facility]    Medicine Progress Note - Hospitalist Service    Date of Admission:  1/28/2023    Assessment & Plan   Shira Rodriguez is a 62-year-old female with rheumatoid arthritis on methotrexate, prior massive PE on warfarin, morbid obesity who presented on 1/28/2023 with severe sepsis.  She was found unresponsive in her home. She  Was hypotensive and had temp of 100.2 in route to hospital.        Severe sepsis-present on admission  Candida intertrigo  Neutropenic fever  Lactic acidosis, Resolved   * Presented with lactic acidosis of 3.2, hypotension, HR of 105, WBC of 0.4, acute metabolic encephalopathy - met criteria for severe sepsis on admission; resolved with IVF and empiric antibiotics  * likely potential sources include panniculitis (significant redness under abd pannus and breasts) or mucositis (severe throat pain x2 days) and duodenitis with severe neutropenia.  * CT C/A/P showed small chronic left pleural effusion, small fluid around distal esophagus and duodenum  * TTE 1/30 with possible vegetation; LETA 1/31 negative for vegetation or dissection  * repeat CT chest 2/4 (due to cough) with slight increase in bilateral pleural effusions, mild increase in atelectasis   * ID consulted with extensive infectious work-up: positive fungitell (significance unclear), positive EBV IgG (but negative IgM and DNA)  * negative infectious work-up:  UA and culture, blood cultures, COVID/RSV/flu, Hep B&C, HIV, HSV, CMV, aspergillus, Blastomyces, histoplasmosis, anaplasma, Rickettsia, Ehrlichia   * completed five day course doxycycline 2/3 (empiric tx for tick-borne illness)  * completed 7-day course fluconazole 2/6 (for Candida intertrigo)  * febrile to 101 2/7, no new infectious symptoms, possibly due to marrow recovery per ID    - unclear significance of Beta D glucan at this time, no obvious systemic fungal infection     - blood cultures 2/7 ngtd  - afebrile past 24 hours; ANC up to 2.8;  discontinue cefepime now that no longer neutropenic per ID recs       Pancytopenia with severe leukopenia and neutropenia, moderate anemia and thrombocytopenia  Severe vitamin B12 deficiency and folate deficiency  * CBC wnl 12/7/2022; On admission 1/28 - Hb 9.4, WBC 0.4, ANC 0.1, platelets 59  * Iron studies 1/29 indicating anemia of chronic disease.  Reticulocyte count suppressed.  B12 undetectable and folate low at 2.8  * initiated on B12 and folate, ultimately started on leucovorin rescue 2/2-2/5 per Heme  * methotrexate level 1/30 undetectable   * Peripheral smear 1/29 showed moderate to marked pancytopenia. Negative for schistocytes and elevated fibrinogen argues against DIC. Negative for circulating blasts on scanning.   * flow cytometry shows polytypic B cells, no aberrant immunophenotype on T cells and rare to absent CD34 positive blasts  * elevated Kappa and lambda free light chains, monoclonal IgM immunoglobulin of lambda light chain type, IgM is elevated at 395 and IgG and IgA are within normal limits   * immunological work-up showed C3 and C4 wnl, ANCA and ds-DNA negative  *  s/p bone marrow biopsy on 2/3/2023 with no clear etiology (see report for details) but no current evidence for neoplasm or aplastic anemia - Heme suspects hypocellularity primarily due to methotrexate  * received 1U PRBC 1/31 and 2/4  * received daily platelet transfusions since 1/30-2/5 for plt <50K  * repeat B12 level 2/8 >4K; will need ongoing outpatient monitoring    - per Heme; transfuse for plt <20K or signs of bleeding; hgb <7 g/dl  - initiated on daily Neupogen 1/30; continue as per Heme  - continue oral folate  - counts appear to be recovering: WBC 6.2 and ANC 2.3, platelet up to 50, hgb stable 02/08/23   - bone marrow cytogenetic studies pending       KRISS on CKD stage 4, resolved  Mild anion gap metabolic acidosis, resolved   * Normal renal function in 2021, Cr 1.7 5/2022, more recent baseline creatinine 2-2.2.    Creatinine on admission 3.25.    * Nephrology consulted, suspect pre-renal with possible progression to ATN, treated with IVF with gradual improvement.  Neph signed off 2/3.   - Cr again up to 2.49 today despite 1L fluid bolus 2/7; will repeat UA and urine sodium, will ask nephrology to re-evaluate     Probable esophagitis  Probable duodenitis  Dysphagia   Mucositis   * she has been complaining of throat pain; oral ulcers presents on exam   * CT on admission showed small amount of fluid adjacent to distal thoracic esophagus and scattered posterior mediastinal lymph nodes along esophagus of uncertain etiology but could be secondary to esophagitis or other inflammatory process.  CT scan also showed small edema around second and third portion of duodenum.  - continue magic mouth wash and nystatin  - SLP following  - continue Protonix  - odynophagia has resolved and mouth pain improving, will not pursue GI involvement for EGD at this time      Panniculitis  Candidal rash-under pannus, breasts  * treated with fluconazole 1/30-2/6 per ID  - Wound care on board      Hypokalemia  - continue replacement protocol     Type II MI due to severe sepsis  * High-sensitivity troponin mildly elevated at 48 and repeat was normal and can be due to demand  * TTE 1/30 with normal LV function without WMA, RV with mildly decreased systolic function     History of massive PE causing PEA arrest, 9/2019  * last dose of warfarin was 1/29, but INR has remained therapeutic through 2/5.   - continue to hold anticoagulation with plt <50K per Onc  - started lovenox ppx per Heme 2/6; hold for platelet <20K or signs of bleeding  - ok to start therapeutic anticoagulation once platelet >50 per Heme - currently having mild vaginal bleeding vs hematuria so will continue prophylactic dosing at this time     Rheumatoid arthritis  * Historically on methotrexate, resumed about 2 weeks prior to admission due to worsening back and shoulder pain.   -Hold  "methotrexate, follow up with outpatient rheumatologist   -reports chronic rheumatic pains are at baseline, no signs of acute flare; however, her sister reports that her chronic pain is fully debilitating and severely restricting her activity at home and outside the home     COPD  chronic cough  * has chronic cough for years and follows with pulmonary at [facility]  * CXR 2/3 showed mild opacity at the left lung base is new since the previous exam - atelectasis vs pneumonia.   * CT chest 2/4 with findings as above   - no signs of acute exacerbation     Chronic major depression  Chronic pain  * see recent pain clinic visit summary from 1/17/2023 for background information  - Continue Abilify, Cymbalta and Neurontin     Essential hypertension  PTA lisinopril initially held due to KRISS and hypotension, which have resolved.  - remains normotensive at this time, resume as indicated     Morbid obesity, BMI 46  Estimated body mass index is 45.9 kg/m  as calculated from the following:    Height as of this encounter: 1.676 m (5' 6\").    Weight as of this encounter: 129 kg (284 lb 6.3 oz).     Acute metabolic encephalopathy secondary to severe sepsis, resolved  Admission head CT negative for acute pathology.  Returned to baseline mental status with treatment of sepsis.     Mild hypovolemic hyponatremia, resolved  Admission sodium 132, normalized with IVF.     Hypomagnesemia, resolved  * replaced per protocol    3 cm right adrenal nodule  * Noted on CT.  Was present on previous imaging as well.  Likely benign  * A.m. cortisol 1/29, normal at 43.9    Hypoalbuminemia, albumin 2.5    Hyperuricemia  * uric acid 8.9 on 1/29/23       Diet: Snacks/Supplements Adult: Other; L: gel20, D: magic cup; With Meals  Combination Diet Soft and Bite Sized Diet (level 6); Thin Liquids (level 0) (no straws)    DVT Prophylaxis: Lovenox   Underwood Catheter: Not present  Lines: None     Cardiac Monitoring: None  Code Status: Full Code      Clinically " "Significant Risk Factors        # Hypokalemia: Lowest K = 3.2 mmol/L in last 2 days, will replace as needed       # Hypoalbuminemia: Lowest albumin = 2.5 g/dL at 1/28/2023 10:21 PM, will monitor as appropriate   # Thrombocytopenia: Lowest platelets = 40 in last 2 days, will monitor for bleeding         # Severe Obesity: Estimated body mass index is 48.58 kg/m  as calculated from the following:    Height as of this encounter: 1.676 m (5' 6\").    Weight as of this encounter: 136.5 kg (301 lb).          Disposition Plan     Expected Discharge Date: 02/09/2023                  Kirby Sahni MD  Hospitalist Service  [facility]  Securely message with [employer] (more info)  Text page via [employer] Paging/Directory   ______________________________________________________________________    Interval History   She reports poor sleep overnight.  Mild sweats, no new infectious complaints.  Throat pain resolved, mouth pain slowly improving.  No other complaints.         Physical Exam   Vital Signs: Temp: 98.8  F (37.1  C) Temp src: Axillary BP: 97/56 Pulse: 82   Resp: 18 SpO2: 93 % O2 Device: None (Room air)    Weight: 301 lbs 0 oz    General Appearance: Obese female in NAD  Respiratory: few crackles on left compared to right, no wheezing or tachypnea  Cardiovascular: RRR ,normal s1/s2 without murmur  GI: abdomen soft, normal bowel sounds  Skin:   Other: Alert and appropriate, cranial nerves grossly intact      Medical Decision Making       55 MINUTES SPENT BY ME on the date of service doing chart review, history, exam, documentation & further activities per the note.  MANAGEMENT DISCUSSED with the following over the past 24 hours: bedside RN, long discussion via phone with patient's sister       Data     I have personally reviewed the following data over the past 24 hrs:    6.2  \   7.5 (L)   / 50 (L)     139 102 40.8 (H) /  83   3.2 (L) 24 2.49 (H) \       INR:  1.62 (H) PTT:  N/A   D-dimer:  N/A Fibrinogen: "  N/A

## 2023-02-09 ENCOUNTER — APPOINTMENT (OUTPATIENT)
Dept: OCCUPATIONAL THERAPY | Facility: CLINIC | Age: 63
DRG: 871 | End: 2023-02-09
Payer: COMMERCIAL

## 2023-02-09 ENCOUNTER — APPOINTMENT (OUTPATIENT)
Dept: SPEECH THERAPY | Facility: CLINIC | Age: 63
DRG: 871 | End: 2023-02-09
Payer: COMMERCIAL

## 2023-02-09 ENCOUNTER — APPOINTMENT (OUTPATIENT)
Dept: PHYSICAL THERAPY | Facility: CLINIC | Age: 63
DRG: 871 | End: 2023-02-09
Payer: COMMERCIAL

## 2023-02-09 LAB
ANION GAP SERPL CALCULATED.3IONS-SCNC: 8 MMOL/L (ref 7–15)
BASOPHILS # BLD MANUAL: 0 10E3/UL (ref 0–0.2)
BASOPHILS NFR BLD MANUAL: 0 %
BLASTS # BLD MANUAL: 0.7 10E3/UL
BLASTS NFR BLD MANUAL: 3 %
BUN SERPL-MCNC: 38.6 MG/DL (ref 8–23)
CALCIUM SERPL-MCNC: 7.9 MG/DL (ref 8.8–10.2)
CHLORIDE SERPL-SCNC: 103 MMOL/L (ref 98–107)
CREAT SERPL-MCNC: 2.57 MG/DL (ref 0.51–0.95)
DEPRECATED HCO3 PLAS-SCNC: 25 MMOL/L (ref 22–29)
EOSINOPHIL # BLD MANUAL: 0 10E3/UL (ref 0–0.7)
EOSINOPHIL NFR BLD MANUAL: 0 %
ERYTHROCYTE [DISTWIDTH] IN BLOOD BY AUTOMATED COUNT: 13.7 % (ref 10–15)
ERYTHROCYTE [DISTWIDTH] IN BLOOD BY AUTOMATED COUNT: 13.7 % (ref 10–15)
GFR SERPL CREATININE-BSD FRML MDRD: 21 ML/MIN/1.73M2
GLUCOSE SERPL-MCNC: 92 MG/DL (ref 70–99)
HCT VFR BLD AUTO: 22.9 % (ref 35–47)
HCT VFR BLD AUTO: 26 % (ref 35–47)
HGB BLD-MCNC: 7.4 G/DL (ref 11.7–15.7)
HGB BLD-MCNC: 8.2 G/DL (ref 11.7–15.7)
INR PPP: 1.47 (ref 0.85–1.15)
LYMPHOCYTES # BLD MANUAL: 1.1 10E3/UL (ref 0.8–5.3)
LYMPHOCYTES NFR BLD MANUAL: 5 %
MCH RBC QN AUTO: 28.4 PG (ref 26.5–33)
MCH RBC QN AUTO: 29 PG (ref 26.5–33)
MCHC RBC AUTO-ENTMCNC: 31.5 G/DL (ref 31.5–36.5)
MCHC RBC AUTO-ENTMCNC: 32.3 G/DL (ref 31.5–36.5)
MCV RBC AUTO: 90 FL (ref 78–100)
MCV RBC AUTO: 90 FL (ref 78–100)
METAMYELOCYTES # BLD MANUAL: 1.8 10E3/UL
METAMYELOCYTES NFR BLD MANUAL: 8 %
MONOCYTES # BLD MANUAL: 0.7 10E3/UL (ref 0–1.3)
MONOCYTES NFR BLD MANUAL: 3 %
MYELOCYTES # BLD MANUAL: 0.7 10E3/UL
MYELOCYTES NFR BLD MANUAL: 3 %
NEUTROPHILS # BLD MANUAL: 16.7 10E3/UL (ref 1.6–8.3)
NEUTROPHILS NFR BLD MANUAL: 75 %
PLAT MORPH BLD: ABNORMAL
PLATELET # BLD AUTO: 102 10E3/UL (ref 150–450)
PLATELET # BLD AUTO: 93 10E3/UL (ref 150–450)
POTASSIUM SERPL-SCNC: 3.3 MMOL/L (ref 3.4–5.3)
POTASSIUM SERPL-SCNC: 3.6 MMOL/L (ref 3.4–5.3)
PROMYELOCYTES # BLD MANUAL: 0.7 10E3/UL
PROMYELOCYTES NFR BLD MANUAL: 3 %
RBC # BLD AUTO: 2.55 10E6/UL (ref 3.8–5.2)
RBC # BLD AUTO: 2.89 10E6/UL (ref 3.8–5.2)
RBC MORPH BLD: ABNORMAL
SODIUM SERPL-SCNC: 136 MMOL/L (ref 136–145)
UFH PPP CHRO-ACNC: 0.38 IU/ML
WBC # BLD AUTO: 22.3 10E3/UL (ref 4–11)
WBC # BLD AUTO: 28 10E3/UL (ref 4–11)

## 2023-02-09 PROCEDURE — 120N000001 HC R&B MED SURG/OB

## 2023-02-09 PROCEDURE — 85610 PROTHROMBIN TIME: CPT | Performed by: HOSPITALIST

## 2023-02-09 PROCEDURE — 84132 ASSAY OF SERUM POTASSIUM: CPT | Performed by: HOSPITALIST

## 2023-02-09 PROCEDURE — 93005 ELECTROCARDIOGRAM TRACING: CPT

## 2023-02-09 PROCEDURE — 250N000013 HC RX MED GY IP 250 OP 250 PS 637: Performed by: HOSPITALIST

## 2023-02-09 PROCEDURE — 250N000013 HC RX MED GY IP 250 OP 250 PS 637: Performed by: INTERNAL MEDICINE

## 2023-02-09 PROCEDURE — 97530 THERAPEUTIC ACTIVITIES: CPT | Mod: GP

## 2023-02-09 PROCEDURE — 99232 SBSQ HOSP IP/OBS MODERATE 35: CPT | Performed by: HOSPITALIST

## 2023-02-09 PROCEDURE — 36415 COLL VENOUS BLD VENIPUNCTURE: CPT | Performed by: HOSPITALIST

## 2023-02-09 PROCEDURE — 250N000011 HC RX IP 250 OP 636: Performed by: PHYSICIAN ASSISTANT

## 2023-02-09 PROCEDURE — 82310 ASSAY OF CALCIUM: CPT | Performed by: HOSPITALIST

## 2023-02-09 PROCEDURE — 97530 THERAPEUTIC ACTIVITIES: CPT | Mod: GO

## 2023-02-09 PROCEDURE — 92526 ORAL FUNCTION THERAPY: CPT | Mod: GN | Performed by: SPEECH-LANGUAGE PATHOLOGIST

## 2023-02-09 PROCEDURE — 250N000011 HC RX IP 250 OP 636: Performed by: HOSPITALIST

## 2023-02-09 PROCEDURE — 99232 SBSQ HOSP IP/OBS MODERATE 35: CPT | Performed by: STUDENT IN AN ORGANIZED HEALTH CARE EDUCATION/TRAINING PROGRAM

## 2023-02-09 PROCEDURE — 93010 ELECTROCARDIOGRAM REPORT: CPT | Performed by: INTERNAL MEDICINE

## 2023-02-09 PROCEDURE — 85520 HEPARIN ASSAY: CPT | Performed by: HOSPITALIST

## 2023-02-09 PROCEDURE — 85027 COMPLETE CBC AUTOMATED: CPT | Performed by: HOSPITALIST

## 2023-02-09 PROCEDURE — 250N000013 HC RX MED GY IP 250 OP 250 PS 637: Performed by: NURSE PRACTITIONER

## 2023-02-09 PROCEDURE — 85007 BL SMEAR W/DIFF WBC COUNT: CPT | Performed by: HOSPITALIST

## 2023-02-09 PROCEDURE — 85014 HEMATOCRIT: CPT | Performed by: HOSPITALIST

## 2023-02-09 PROCEDURE — 99232 SBSQ HOSP IP/OBS MODERATE 35: CPT | Performed by: INTERNAL MEDICINE

## 2023-02-09 PROCEDURE — 97110 THERAPEUTIC EXERCISES: CPT | Mod: GO

## 2023-02-09 RX ORDER — HEPARIN SODIUM 10000 [USP'U]/100ML
0-5000 INJECTION, SOLUTION INTRAVENOUS CONTINUOUS
Status: DISCONTINUED | OUTPATIENT
Start: 2023-02-09 | End: 2023-02-15

## 2023-02-09 RX ORDER — POTASSIUM CHLORIDE 1500 MG/1
40 TABLET, EXTENDED RELEASE ORAL ONCE
Status: COMPLETED | OUTPATIENT
Start: 2023-02-09 | End: 2023-02-09

## 2023-02-09 RX ORDER — MICONAZOLE NITRATE 20 MG/G
CREAM TOPICAL 2 TIMES DAILY
Status: DISCONTINUED | OUTPATIENT
Start: 2023-02-09 | End: 2023-02-21 | Stop reason: HOSPADM

## 2023-02-09 RX ORDER — WARFARIN SODIUM 2.5 MG/1
2.5 TABLET ORAL
Status: COMPLETED | OUTPATIENT
Start: 2023-02-09 | End: 2023-02-09

## 2023-02-09 RX ADMIN — DULOXETINE HYDROCHLORIDE 120 MG: 60 CAPSULE, DELAYED RELEASE ORAL at 08:40

## 2023-02-09 RX ADMIN — NYSTATIN 500000 UNITS: 100000 SUSPENSION ORAL at 14:34

## 2023-02-09 RX ADMIN — HYDROMORPHONE HYDROCHLORIDE 0.2 MG: 0.2 INJECTION, SOLUTION INTRAMUSCULAR; INTRAVENOUS; SUBCUTANEOUS at 16:27

## 2023-02-09 RX ADMIN — GABAPENTIN 100 MG: 100 CAPSULE ORAL at 14:33

## 2023-02-09 RX ADMIN — FOLIC ACID 2 MG: 1 TABLET ORAL at 08:40

## 2023-02-09 RX ADMIN — NYSTATIN 500000 UNITS: 100000 SUSPENSION ORAL at 08:41

## 2023-02-09 RX ADMIN — POTASSIUM CHLORIDE 40 MEQ: 1500 TABLET, EXTENDED RELEASE ORAL at 10:53

## 2023-02-09 RX ADMIN — PANTOPRAZOLE SODIUM 40 MG: 40 TABLET, DELAYED RELEASE ORAL at 06:42

## 2023-02-09 RX ADMIN — HEPARIN SODIUM 1800 UNITS/HR: 10000 INJECTION, SOLUTION INTRAVENOUS at 14:39

## 2023-02-09 RX ADMIN — HEPARIN SODIUM 5000 UNITS: 5000 INJECTION, SOLUTION INTRAVENOUS; SUBCUTANEOUS at 06:42

## 2023-02-09 RX ADMIN — GABAPENTIN 100 MG: 100 CAPSULE ORAL at 08:40

## 2023-02-09 RX ADMIN — PANTOPRAZOLE SODIUM 40 MG: 40 TABLET, DELAYED RELEASE ORAL at 16:28

## 2023-02-09 RX ADMIN — ARIPIPRAZOLE 5 MG: 5 TABLET ORAL at 08:40

## 2023-02-09 RX ADMIN — MULTIPLE VITAMINS W/ MINERALS TAB 1 TABLET: TAB at 08:40

## 2023-02-09 RX ADMIN — NYSTATIN 500000 UNITS: 100000 SUSPENSION ORAL at 23:39

## 2023-02-09 RX ADMIN — OXYCODONE HYDROCHLORIDE 2.5 MG: 5 TABLET ORAL at 14:41

## 2023-02-09 RX ADMIN — ATORVASTATIN CALCIUM 40 MG: 40 TABLET, FILM COATED ORAL at 20:52

## 2023-02-09 RX ADMIN — GABAPENTIN 100 MG: 100 CAPSULE ORAL at 20:52

## 2023-02-09 RX ADMIN — MICONAZOLE NITRATE: 20 CREAM TOPICAL at 17:25

## 2023-02-09 RX ADMIN — NYSTATIN 500000 UNITS: 100000 SUSPENSION ORAL at 17:26

## 2023-02-09 RX ADMIN — WARFARIN SODIUM 2.5 MG: 2.5 TABLET ORAL at 17:25

## 2023-02-09 ASSESSMENT — ACTIVITIES OF DAILY LIVING (ADL)
ADLS_ACUITY_SCORE: 58
ADLS_ACUITY_SCORE: 58
ADLS_ACUITY_SCORE: 48
ADLS_ACUITY_SCORE: 58
ADLS_ACUITY_SCORE: 58
ADLS_ACUITY_SCORE: 48
ADLS_ACUITY_SCORE: 58
ADLS_ACUITY_SCORE: 48
ADLS_ACUITY_SCORE: 58

## 2023-02-09 NOTE — PLAN OF CARE
MD Notification    Notified Person: MD    Notified Person Name: Dr. Gianluca Mera    Notification Date/Time: 2/9/2023 @ 0015    Notification Interaction: page via [employer]    Purpose of Notification: 8825- S.S. Pt's HR was jumping between 49-72.  All other VSS. Urine output ok. History of happening 2 days ago, EKG showed sinus with PAC's, fluid bolus was given. Repeat? Thanks Gina *[id number]    Orders Received: CTM    Recheck HR, was 48. Re-paged.     8825- S.S. just checked HR, still 48 for full minute, wasn't jumping. Still CTM? Thanks Gina *[id number].    12-lead EKG ordered.

## 2023-02-09 NOTE — PROGRESS NOTES
Renal Medicine Progress Note            Assessment/Plan:     Assessment: Shira Rodriguez is a 63 yo female with PMH RA on methotrexate, morbid obesity, h/o PEA arrest due to PE admitted after being found down, found to have severe pancytopenia, intertrigo, severe sepsis, and KRISS.     KRISS, stable  Cr normal as of 2021, notably 1.7 in May 2022 and recently 2.1. KRISS earlier in admission thought to be prerenal KRISS with probable progression to ATN. Cr had improved to a presley of  1.8. Significant Cr jump starting 2/7. No IV contrast, no hypotensive events. UOP has remained good. Only new med was lovenox 40mg daily started 2/6. Cr seems to be overestimating her renal function, Cr of 2.5 (eGFR 21) correlated with Cystatin C 3.4 (eGFR 15). Possible that meds need further dose reduction given Cr overestimation. Will trial stopping lovenox to see if any improvement. Cr stable last two days, hopefully this represents a plateau with eventual improvement in coming days.   - hold lovenox, switch to heparin   - renally dose meds   - I/Os  - daily BMP     Pancytopenia   Hematology consulted, has undergone bone marrow bx without clear etiology besides methotrexate BM suppression. Is getting neupogen.     Neutropenic fever, resolved  ID following, completing course of cefepime.    Intertrigo   Completed fluconazole course.      Metabolic acidosis, resolved   Lactic acidosis, resolved, resolved    Mild hypokalemia  Replace prn.        Interval History:     - no events overnight   - good appetite  - feels improved  - worked well with PT   - only complaint is skin pain related to intertrigo in folds            Medications and Allergies:       ARIPiprazole  5 mg Oral Daily     atorvastatin  40 mg Oral QPM     DULoxetine  120 mg Oral Daily     folic acid  2 mg Oral Daily     gabapentin  100 mg Oral TID     miconazole   Topical BID     multivitamin w/minerals  1 tablet Oral Daily     nystatin  500,000 Units Swish & Spit 4x Daily      "pantoprazole  40 mg Oral BID AC     sodium chloride (PF)  3 mL Intracatheter Q8H     warfarin ANTICOAGULANT  2.5 mg Oral ONCE at 18:00        Allergies   Allergen Reactions     Adhesive Tape Blisters     Erythromycin Rash            Physical Exam:   Vitals were reviewed  /56 (BP Location: Left arm)   Pulse 52   Temp 97.7  F (36.5  C) (Oral)   Resp 18   Ht 1.676 m (5' 6\")   Wt 136.5 kg (301 lb)   SpO2 95%   BMI 48.58 kg/m      Wt Readings from Last 3 Encounters:   02/08/23 136.5 kg (301 lb)   01/17/23 129.3 kg (285 lb)   12/06/22 129.3 kg (285 lb)       Intake/Output Summary (Last 24 hours) at 2/8/2023 1153  Last data filed at 2/8/2023 1113  Gross per 24 hour   Intake --   Output 1775 ml   Net -1775 ml       GENERAL APPEARANCE: no acute distress, resting comfortably in bed  HEENT: MMM  RESP: clear  CV: RRR  EXTREMITIES/SKIN: no LE edema, multiple bruises, intertrigo in multiple skin folds  NEURO:  Alert, oriented, normal speech           Data:     BMP  Recent Labs   Lab 02/09/23  1500 02/09/23  0758 02/08/23  1454 02/08/23  0703 02/07/23  0300 02/05/23  2041 02/05/23  0922 02/04/23  1542 02/04/23  0928   NA  --  136  --  139  --   --  142  --  144   POTASSIUM 3.6 3.3* 3.5 3.2* 3.5   < > 3.1*   < > 3.2*   CHLORIDE  --  103  --  102  --   --  107  --  108*   MARIA INES  --  7.9*  --  8.1*  --   --  8.2*  --  8.5*   CO2  --  25  --  24  --   --  24  --  25   BUN  --  38.6*  --  40.8*  --   --  43.9*  --  44.5*   CR  --  2.57*  --  2.49* 2.29*  --  1.95*  --  1.94*   GLC  --  92  --  83  --   --  89  --  77    < > = values in this interval not displayed.     CBC  Recent Labs   Lab 02/09/23  1345 02/09/23  0758 02/08/23  0703 02/07/23  0300   WBC 28.0* 22.3* 6.2 1.3*   HGB 8.2* 7.4* 7.5* 7.1*   HCT 26.0* 22.9* 23.0* 21.4*   MCV 90 90 90 88   PLT 93* 102* 50* 40*     Lab Results   Component Value Date    AST 28 01/28/2023    ALT 13 01/28/2023    ALKPHOS 57 01/28/2023    BILITOTAL 0.7 01/28/2023    BILICONJ 0.0 " 12/30/2008     Lab Results   Component Value Date    INR 1.47 (H) 02/09/2023       Attestation:  I have reviewed today's vital signs, notes, medications, labs and imaging.    Juan Lott MD  [facility] - [facility]phrology  Office: [phone]

## 2023-02-09 NOTE — SIGNIFICANT EVENT
Pt needs to come in this week for pill count on his percocet. Will do random UDS at that time also. If he does not come in this week will not be able to give him anymore percocet scripts. Significant Event Note    Time of event: 12:58 AM February 9, 2023    Description of event:  Paged for bradycardia; HR down as low as 40.    Plan:  STAT EKG    Addendum 1:45 AM: EKG shows NSR with bigeminy. Suspect bradycardia recorded on the monitor is spurious.    Discussed with: bedside nurse    Gianluca Mera MD

## 2023-02-09 NOTE — CONSULTS
Care Management Initial Consult    General Information  Assessment completed with: Patient,    Type of CM/SW Visit: Initial Assessment    Primary Care Provider verified and updated as needed: Yes   Readmission within the last 30 days: no previous admission in last 30 days      Reason for Consult: discharge planning  Advance Care Planning: Advance Care Planning Reviewed: other (see comments) (No acp docs)          Communication Assessment  Patient's communication style: spoken language (English or Bilingual)    Hearing Difficulty or Deaf: no   Wear Glasses or Blind: yes    Cognitive  Cognitive/Neuro/Behavioral: WDL  Level of Consciousness: alert  Arousal Level: opens eyes spontaneously  Orientation: oriented x 4  Mood/Behavior: calm, cooperative  Best Language: 0 - No aphasia  Speech: fluent, clear, logical, spontaneous    Living Environment:   People in home: alone     Current living Arrangements: apartment      Able to return to prior arrangements: yes       Family/Social Support:  Care provided by: self  Provides care for: no one  Marital Status: Single  Sibling(s)          Description of Support System: Supportive, Involved    Support Assessment: Adequate family and caregiver support, Adequate social supports    Current Resources:   Patient receiving home care services: No     Community Resources: None  Equipment currently used at home: walker, rolling  Supplies currently used at home: None    Employment/Financial:  Employment Status: retired        Financial Concerns:             Lifestyle & Psychosocial Needs:  Social Determinants of Health     Tobacco Use: Medium Risk     Smoking Tobacco Use: Former     Smokeless Tobacco Use: Never     Passive Exposure: Not on file   Alcohol Use: Not on file   Financial Resource Strain: Not on file   Food Insecurity: Not on file   Transportation Needs: Not on file   Physical Activity: Not on file   Stress: Not on file   Social Connections: Not on file   Intimate Partner  Violence: Not on file   Depression: At risk     PHQ-2 Score: 6   Housing Stability: Not on file       Functional Status:  Prior to admission patient needed assistance:              Mental Health Status:          Chemical Dependency Status:                Values/Beliefs:  Spiritual, Cultural Beliefs, Shinto Practices, Values that affect care: no               Additional Information:  Per care management/social work consult for discharge planning, patient was admitted on 1/29/2023 with worsening back and shoulder pain. Tentative date of discharge is 2/10/2023. Writer talked to patient and she stated that she lives alone in an apartment. She was living independently before coming to the hospital. She uses a walker as needed. Mentioned tcu recommendation and she said that she would like referrals sent to [facility], [facility], [facility], [facility], and [facility]. Writer sent via [facility].    Will continue to follow.    CHARLENE Zheng

## 2023-02-09 NOTE — PLAN OF CARE
1502-1110: VSS on RA, BP soft. Afebrile. Pulse regular this shift. Pre-medicated with dilaudid for wound cares- able to tolerate more. Oxy given x1 post therapy. Good appetite and tolerated regular diet with thin liquids. Still with minimal vaginal bleeding present- hgb stable at 7.5 and plt improving to 50. WBC improved to 6.2. Switched to heparin by neph for renal function. Purewick in place, no BM. Up to chair x1 with lift today. Plan to TCU pending improvement.

## 2023-02-09 NOTE — PHARMACY-ANTICOAGULATION SERVICE
Clinical Pharmacy - Warfarin Dosing Consult     Pharmacy has been consulted to manage this patient s warfarin therapy.  Indication: DVT/PE Prophylaxis  Therapy Goal: INR 2-3  Warfarin Prior to Admission: Yes  Warfarin PTA Regimen: 5 mg M and 2.5 mg ROW  Significant drug interactions: hep gtt may increase bleeding risk.  Recent documented change in oral intake/nutrition: Unknown  Dose Comments: Last known dose on 1/30 was 5 mg, slow drifting INR since    INR   Date Value Ref Range Status   02/09/2023 1.47 (H) 0.85 - 1.15 Final   02/08/2023 1.62 (H) 0.85 - 1.15 Final       Recommend warfarin 2.5 mg today.  Pharmacy will monitor Shira Castillolivan daily and order warfarin doses to achieve specified goal.      Please contact pharmacy as soon as possible if the warfarin needs to be held for a procedure or if the warfarin goals change.      Nancy Lei, PharmD  Inpatient Clinical Pharmacist  [phone]

## 2023-02-09 NOTE — PLAN OF CARE
Speech Language Therapy Discharge Summary    Reason for therapy discharge:    All goals and outcomes met, no further needs identified.    Progress towards therapy goal(s). See goals on Care Plan in [facility] electronic health record for goal details.  Goals met    Therapy recommendation(s):    No further therapy is recommended. Patient met swallow goals and is tolerating regular diet and thin liquids.

## 2023-02-09 NOTE — PROGRESS NOTES
[facility] Nurse Inpatient Assessment     Consulted for: Abdomen, bilateral breast, umbilicus, groin, right popliteal, gluteal folds    Patient History (according to provider note(s):      62-year-old female with rheumatoid arthritis on methotrexate, prior massive PE on warfarin, morbid obesity who presented on 1/28/2023 with severe sepsis.  She was found unresponsive in her home.  Was hypotensive, had temp of 100.2 in route to hospital.      Severe sepsis-present on admission  Neutropenic fever  Panniculitis   Candidal rash-under pannus, breasts    Areas Assessed:      Areas visualized during today's visit: Skin folds     Skin Injury Location: Bilateral breast, bilateral groin, pannus, umbilicus, right popliteal, gluteal fold  2-7-23 2-2-23 right breast      1/30/23 Right breast          2/7/23 2-2-23 left breast      1-30-23 Left Breast        2/7/23 mid pannus      2-2-23 1-30-23 Right abdomen      2/7/23 Left pannus      1-30-23 Left abdomen    1-30-23 Umbilicus                                            2/9 umbilica hernia        2/7/23right knee crease      2-2-23 right knee crease (medial popliteal)      1-30-23 Right medial popliteal      2-2-23 gluteal fold    Skin injury due to: Fungal rash  and Intertrigo  Skin history and plan of care:  Patient on disability, patient has no assistance at home. Poor hygiene. Arrives with rashes and denudement within multiple skin folds. Many areas in pannus are evolving with large open ulcerations that are extremely painful       Skin assessment: rash area has decreased significantly in all areas, erosion still significant in right breast crease and right knee crease     Measurements (length x width x depth, in cm)   Right breast  approx 10 cm x 30 cm x 0.1cm,   Left breast 0.7cm x 8cm x 0.1cm   Umblicus 3.2 cm x 0.5cm x 1.5 cm 5 to 9 o'clock undermining    Pannus/Groin/Mons Pubis wound extends full width of abdomen  approximately 75 cm, length runs from abdomen through mons pubis and bilateral groin approximately 25 cm with a depth of 0.1cm.   Right popliteal  Approximately 5 cm x 10 cm x 0.1cm      Color: pink      Temperature  normal      Drainage: small      Color: serous      Odor: none   Pain: site specific - mostly right knee, right lateral breast and gluteal crease   Pain interventions prior to dressing change: slow and gentle cares and pain meds given after when available  Treatment goal: Heal , Infection control/prevention and Decrease moisture  STATUS: significant improvement in size of wounds and decrease in moisture   Supplies ordered: ordered remy for umbilicus hernia       Treatment Plan:     Breast folds and pannus fold, gluteal fold, right posterior knee fold BID   Fill folds with Vashe #[id number] moistened kerlix (likely need 4 rolls) x 15 minutes  Apply Bandar antifungal barrier cream (pharmacy item)    Right lower abdomen hernia - daily   Lay a vashe moistened opened 2x2 in the open area x 15 minutes  Fill that area with remy/promogram piece #[id number]  No need to cover unless draining        Orders: updated     RECOMMEND PRIMARY TEAM ORDER: surgical consult for chronic abdominal/umbilicus hernia     Education provided: plan of care, wound progress, Moisture management and Hygiene  Discussed plan of care with: Patient and Nurse  WOC nurse follow-up plan: weekly   Notify WOC if wound(s) deteriorate.  Nursing to notify the Provider(s) and re-consult the WOC Nurse if new skin concern.    DATA:     Current support surface: Standard  Standard gel/foam mattress (IsoFlex, Atmos air, etc)  Containment of urine/stool: Incontinent pad in bed and Purewick external catheter   BMI: Body mass index is 48.58 kg/m .   Active diet order: Orders Placed This Encounter      Combination Diet Regular Diet; Thin Liquids (level 0) (no straws)     Output: I/O last 3 completed shifts:  In: -   Out: 1900 [Urine:1900]     Labs:   Recent  Labs   Lab 02/09/23  0758 02/08/23  0703   HGB  --  7.5*   INR 1.47* 1.62*   WBC  --  6.2     Pressure injury risk assessment:   Sensory Perception: 3-->slightly limited  Moisture: 3-->occasionally moist  Activity: 1-->bedfast  Mobility: 2-->very limited  Nutrition: 3-->adequate  Friction and Shear: 1-->problem  Ole Score: 13    Viky Glasgow RN CWOCN   Dept. Vocera- Contact [facility] Nurse ([place]) via  Vocera   Dept. Office Number: [phone]

## 2023-02-09 NOTE — PROGRESS NOTES
Service Date: 02/08/2023    SUBJECTIVE:  Ms. Rodriguez is a 62-year-old female with pancytopenia.  Pancytopenia is secondary to hypocellular marrow, vitamin B12 deficiency and folate deficiency.  Anemia is also due to renal disease and anemia of chronic disease.  Bone marrow biopsy reveals mildly hypocellular marrow with trilineage hematopoiesis and increased iron stores.  Cellularity of 30%.  Flow cytometry reveals polytypic B cells.  Blasts at upper limit of normal at 5%.    The patient's overall condition is slowly improving.  She feels slightly stronger.  She is afebrile.  No bleeding from ear, nose or throat.  No blood in the urine or stool.  She had some vaginal bleeding.    PHYSICAL EXAMINATION:    GENERAL:  She is alert, oriented x 3.  Not in distress.  VITAL SIGNS:  Reviewed.  Rest of systems not examined.    LABS:  CBC reviewed.  WBC normal at 6.2.    ASSESSMENT:    1.  A 62-year-old female with pancytopenia secondary to hypocellular marrow, vitamin B12 deficiency and folate deficiency.  2.  Vitamin B12 deficiency.  3.  Folate deficiency.  4.  Rheumatoid arthritis.  5.  Pulmonary embolism.  Anticoagulation has been on hold because of thrombocytopenia.    PLAN:    1.  The patient is slowly improving.  CBC was reviewed.  She was happy to know that WBC is normal.  Her thrombocytopenia and anemia are also better.    The patient has been on Neupogen.  That will be discontinued.  Will monitor CBC.  Transfusion support will be given as needed.  Hopefully, she does not need any further transfusion.     2.  The patient had a history of pulmonary embolism.  Anticoagulation has been on hold.  If her platelet remains above 50 tomorrow, therapeutic anticoagulation can be started.    3.  She will be continued on vitamin B12 and folate replacement.  Her vitamin B12 level is elevated because of B12 injection.  Monthly vitamin B12 injection will be started in next few days.    4.  She had few questions, which were all  answered.  Hematology/Oncology will continue to follow.    TOTAL TIME SPENT:  25 minutes.  Time spent on today's visit, review of chart/investigations today and documentation today.    Priscilla Saab MD        D: 2023   T: 2023   MT: [id number]    Name:     XAVIER ROWELL  MRN:      [id number]        Account:      [id number]   :      1960           Service Date: 2023       Document: [id number]

## 2023-02-09 NOTE — PROGRESS NOTES
[facility]    Medicine Progress Note - Hospitalist Service    Date of Admission:  1/28/2023    Assessment & Plan   Shira Rodriguez is a 62-year-old female with rheumatoid arthritis on methotrexate, prior massive PE on warfarin, morbid obesity who presented on 1/28/2023 with severe sepsis.  She was found unresponsive in her home. She  Was hypotensive and had temp of 100.2 in route to hospital.        Severe sepsis-present on admission  Candida intertrigo  Neutropenic fever  Lactic acidosis, Resolved   * Presented with lactic acidosis of 3.2, hypotension, HR of 105, WBC of 0.4, acute metabolic encephalopathy - met criteria for severe sepsis on admission; resolved with IVF and empiric antibiotics  * likely potential sources include panniculitis (significant redness under abd pannus and breasts) or mucositis (severe throat pain x2 days) and duodenitis with severe neutropenia.  * CT C/A/P showed small chronic left pleural effusion, small fluid around distal esophagus and duodenum  * TTE 1/30 with possible vegetation; LETA 1/31 negative for vegetation or dissection  * repeat CT chest 2/4 (due to cough) with slight increase in bilateral pleural effusions, mild increase in atelectasis   * ID consulted with extensive infectious work-up: positive fungitell (significance unclear), positive EBV IgG (but negative IgM and DNA)  * negative infectious work-up:  UA and culture, blood cultures, COVID/RSV/flu, Hep B&C, HIV, HSV, CMV, aspergillus, Blastomyces, histoplasmosis, anaplasma, Rickettsia, Ehrlichia   * completed five day course doxycycline 2/3 (empiric tx for tick-borne illness)  * completed 7-day course fluconazole 2/6 (for Candida intertrigo)  * febrile to 101 2/7, no new infectious symptoms, possibly due to marrow recovery per ID  * cefepime discontinued 2/8 following recovery of neutrophil count per ID    - unclear significance of Beta D glucan at this time, no obvious systemic fungal infection      - blood cultures 2/7 ngtd  - remains afebrile; monitor off antibiotics  - current leukocytosis due to Neuopgen       Pancytopenia with severe leukopenia and neutropenia, moderate anemia and thrombocytopenia  Severe vitamin B12 deficiency and folate deficiency  * CBC wnl 12/7/2022; On admission 1/28 - Hb 9.4, WBC 0.4, ANC 0.1, platelets 59  * Iron studies 1/29 indicating anemia of chronic disease.  Reticulocyte count suppressed.  B12 undetectable and folate low at 2.8  * initiated on B12 and folate, ultimately started on leucovorin rescue 2/2-2/5 per Heme  * methotrexate level 1/30 undetectable   * Peripheral smear 1/29 showed moderate to marked pancytopenia. Negative for schistocytes and elevated fibrinogen argues against DIC. Negative for circulating blasts on scanning.   * flow cytometry shows polytypic B cells, no aberrant immunophenotype on T cells and rare to absent CD34 positive blasts  * elevated Kappa and lambda free light chains, monoclonal IgM immunoglobulin of lambda light chain type, IgM is elevated at 395 and IgG and IgA are within normal limits   * immunological work-up showed C3 and C4 wnl, ANCA and ds-DNA negative  *  s/p bone marrow biopsy on 2/3/2023 with no clear etiology (see report for details) but no current evidence for neoplasm or aplastic anemia - Heme suspects hypocellularity primarily due to methotrexate, B12 and folate deficiency  * received 1U PRBC 1/31 and 2/4  * received daily platelet transfusions 1/30-2/5 for plt <50K  * repeat B12 level 2/8 >4K; will need ongoing outpatient monitoring and monthly B12 injections  * received daily Neupogen 1/30-2/7; stopped with recovery of cell counts    - per Heme; transfuse for plt <20K or signs of bleeding; hgb <7 g/dl  - continue oral folate; outpatient monthly B12 injections  - counts recovering: WBC 22, plt 102, hgb stable 02/09/23   - bone marrow cytogenetic studies pending       KRISS on CKD stage 4, resolved  Mild anion gap metabolic  acidosis, resolved   * Normal renal function in 2021, Cr 1.7 5/2022, more recent baseline creatinine 2-2.2.   Creatinine on admission 3.25.    * Nephrology consulted, suspect pre-renal with possible progression to ATN, treated with IVF with gradual improvement.  Neph signed off 2/3.   * Neph re-consulted 2/8 due to worsening renal function of unclear etiology, no improvement with fluid bolus    - Cr up again to 2.57 on 02/09/23; monitor daily  - GFR calculated with use of cystatin c on 2/8 is 14 which is marked discrepancy from GFR calculation with Cr; further assessment and work-up per Neph, recs much appreciated     Probable esophagitis  Probable duodenitis  Dysphagia   Mucositis   * she has been complaining of throat pain; oral ulcers presents on exam   * CT on admission showed small amount of fluid adjacent to distal thoracic esophagus and scattered posterior mediastinal lymph nodes along esophagus of uncertain etiology but could be secondary to esophagitis or other inflammatory process.  CT scan also showed small edema around second and third portion of duodenum.  - continue magic mouth wash and nystatin  - SLP following, has advanced to reg diet   - continue Protonix  - odynophagia has resolved and mouth pain improving, will not pursue GI involvement for EGD at this time      Panniculitis, improving  Candidal rash-under pannus, breasts, improving  * treated with fluconazole 1/30-2/6 per ID  - Wound care on board   - continue miconazole cream     Hypokalemia  - continue replacement protocol     Type II MI due to severe sepsis  * High-sensitivity troponin mildly elevated at 48 and repeat was normal and can be due to demand  * TTE 1/30 with normal LV function without WMA, RV with mildly decreased systolic function     History of massive PE causing PEA arrest, 9/2019  * last dose of warfarin was 1/29, but INR has remained therapeutic through 2/5.   - ok to resume therapeutic anticoagulation now that platelet >50K  "per Heme  - as above, significant discrepancy between GFR calculation with cystatin c vs Cr; will start heparin gtt bridge to coumadin     Rheumatoid arthritis  * Historically on methotrexate, resumed about 2 weeks prior to admission due to worsening back and shoulder pain.   * sent inbox message to outpatient rheumatologist, Dr. Austin at [facility], on 2/8 and he plans for clinic to contact her to schedule f/u appt within 1 month  -Hold methotrexate, follow up with outpatient rheumatologist      COPD  chronic cough  * has chronic cough for years and follows with pulmonary at [facility]  * CXR 2/3 showed mild opacity at the left lung base is new since the previous exam - atelectasis vs pneumonia.   * CT chest 2/4 with findings as above   - no signs of acute exacerbation     Chronic major depression  Chronic pain  * see recent pain clinic visit summary from 1/17/2023 for background information  - Continue Abilify, Cymbalta and Neurontin     Essential hypertension  PTA lisinopril initially held due to KRISS and hypotension, which have resolved.  - remains normotensive at this time, resume as indicated     Morbid obesity, BMI 46  Estimated body mass index is 45.9 kg/m  as calculated from the following:    Height as of this encounter: 1.676 m (5' 6\").    Weight as of this encounter: 129 kg (284 lb 6.3 oz).     Acute metabolic encephalopathy secondary to severe sepsis, resolved  Admission head CT negative for acute pathology.  Returned to baseline mental status with treatment of sepsis.     Mild hypovolemic hyponatremia, resolved  Admission sodium 132, normalized with IVF.     Hypomagnesemia, resolved  * replaced per protocol    3 cm right adrenal nodule  * Noted on CT.  Was present on previous imaging as well.  Likely benign  * A.m. cortisol 1/29, normal at 43.9    Hypoalbuminemia, albumin 2.5    Hyperuricemia  * uric acid 8.9 on 1/29/23       Diet: Snacks/Supplements Adult: Other; L: gel20, D: magic cup; With Meals  Combination " "Diet Regular Diet; Thin Liquids (level 0) (no straws)    DVT Prophylaxis: Lovenox   Underwood Catheter: Not present  Lines: None     Cardiac Monitoring: None  Code Status: Full Code      Clinically Significant Risk Factors        # Hypokalemia: Lowest K = 3.2 mmol/L in last 2 days, will replace as needed       # Hypoalbuminemia: Lowest albumin = 2.5 g/dL at 1/28/2023 10:21 PM, will monitor as appropriate   # Thrombocytopenia: Lowest platelets = 50 in last 2 days, will monitor for bleeding         # Severe Obesity: Estimated body mass index is 48.58 kg/m  as calculated from the following:    Height as of this encounter: 1.676 m (5' 6\").    Weight as of this encounter: 136.5 kg (301 lb).          Disposition Plan      Expected Discharge Date: 02/10/2023                  Kirby Sahni MD  Hospitalist Service  [facility]  Securely message with [employer] (more info)  Text page via [employer] Paging/Directory   ______________________________________________________________________    Interval History   Reports some mild sweats overnight, unchanged from prior.  No new infectious symptoms.  Offers no other complaints.        Physical Exam   Vital Signs: Temp: 97.8  F (36.6  C) Temp src: Oral BP: 112/61 Pulse: 91   Resp: 18 SpO2: 94 % O2 Device: None (Room air)    Weight: 301 lbs 0 oz    General Appearance: Obese female in NAD  Respiratory: lungs CTAB, no wheezing or tachypnea  Cardiovascular: RRR ,normal s1/s2 without murmur  GI: abdomen soft, normal bowel sounds  Skin:   Other: Alert and appropriate, cranial nerves grossly intact      Medical Decision Making       40 MINUTES SPENT BY ME on the date of service doing chart review, history, exam, documentation & further activities per the note.      Data     I have personally reviewed the following data over the past 24 hrs:    22.3 (H)  \   7.4 (L)   / 102 (L)     136 103 38.6 (H) /  92   3.3 (L) 25 2.57 (H) \       INR:  1.47 (H) PTT:  N/A   D-dimer:  N/A " Fibrinogen:  N/A

## 2023-02-09 NOTE — PROVIDER NOTIFICATION
MD Notification    Notified Person: MD    Notified Person Name: Bora    Notification Date/Time: 2/9/23 1110    Notification Interaction: [employer] messaging     Purpose of Notification:  BIANCA Rivers recommending we use anti-fungal cream for wound healing, can you order this? Thanks       Orders Received:  Miconazole cream ordered    Comments:

## 2023-02-09 NOTE — PLAN OF CARE
Goal Outcome Evaluation: 1930-0700    A&O x4, cooperative an pleasant. VSS on RA ex bradycardia. Turn and repo q2. Full wound cares done x1, premedicated with IV Dilaudid before. Left pannus had moderate amount of bleeding.  White washcloths changed @ 0300. Purewick in place with adequate output.

## 2023-02-10 ENCOUNTER — APPOINTMENT (OUTPATIENT)
Dept: OCCUPATIONAL THERAPY | Facility: CLINIC | Age: 63
DRG: 871 | End: 2023-02-10
Payer: COMMERCIAL

## 2023-02-10 ENCOUNTER — APPOINTMENT (OUTPATIENT)
Dept: PHYSICAL THERAPY | Facility: CLINIC | Age: 63
DRG: 871 | End: 2023-02-10
Payer: COMMERCIAL

## 2023-02-10 LAB
ALBUMIN UR-MCNC: 50 MG/DL
ANION GAP SERPL CALCULATED.3IONS-SCNC: 8 MMOL/L (ref 7–15)
APPEARANCE UR: CLEAR
ATRIAL RATE - MUSE: 95 BPM
BACTERIA #/AREA URNS HPF: ABNORMAL /HPF
BASOPHILS # BLD MANUAL: 0 10E3/UL (ref 0–0.2)
BASOPHILS NFR BLD MANUAL: 0 %
BILIRUB UR QL STRIP: NEGATIVE
BLASTS # BLD MANUAL: 0.3 10E3/UL
BLASTS NFR BLD MANUAL: 1 %
BUN SERPL-MCNC: 36.5 MG/DL (ref 8–23)
CALCIUM SERPL-MCNC: 8.1 MG/DL (ref 8.8–10.2)
CHLORIDE SERPL-SCNC: 108 MMOL/L (ref 98–107)
COLOR UR AUTO: ABNORMAL
CREAT SERPL-MCNC: 2.57 MG/DL (ref 0.51–0.95)
DEPRECATED HCO3 PLAS-SCNC: 25 MMOL/L (ref 22–29)
DIASTOLIC BLOOD PRESSURE - MUSE: NORMAL MMHG
ELLIPTOCYTES BLD QL SMEAR: SLIGHT
EOSINOPHIL # BLD MANUAL: 0 10E3/UL (ref 0–0.7)
EOSINOPHIL NFR BLD MANUAL: 0 %
EOSINOPHIL SPEC QL WRIGHT STN: NORMAL
ERYTHROCYTE [DISTWIDTH] IN BLOOD BY AUTOMATED COUNT: 13.9 % (ref 10–15)
FRAGMENTS BLD QL SMEAR: SLIGHT
GFR SERPL CREATININE-BSD FRML MDRD: 21 ML/MIN/1.73M2
GLUCOSE SERPL-MCNC: 95 MG/DL (ref 70–99)
GLUCOSE UR STRIP-MCNC: NEGATIVE MG/DL
HCT VFR BLD AUTO: 24.2 % (ref 35–47)
HGB BLD-MCNC: 7.7 G/DL (ref 11.7–15.7)
HGB UR QL STRIP: ABNORMAL
INR PPP: 1.38 (ref 0.85–1.15)
INTERPRETATION ECG - MUSE: NORMAL
KETONES UR STRIP-MCNC: NEGATIVE MG/DL
LEUKOCYTE ESTERASE UR QL STRIP: NEGATIVE
LYMPHOCYTES # BLD MANUAL: 2.4 10E3/UL (ref 0.8–5.3)
LYMPHOCYTES NFR BLD MANUAL: 7 %
MCH RBC QN AUTO: 28.9 PG (ref 26.5–33)
MCHC RBC AUTO-ENTMCNC: 31.8 G/DL (ref 31.5–36.5)
MCV RBC AUTO: 91 FL (ref 78–100)
METAMYELOCYTES # BLD MANUAL: 4.8 10E3/UL
METAMYELOCYTES NFR BLD MANUAL: 14 %
MONOCYTES # BLD MANUAL: 2.4 10E3/UL (ref 0–1.3)
MONOCYTES NFR BLD MANUAL: 7 %
MYELOCYTES # BLD MANUAL: 1.4 10E3/UL
MYELOCYTES NFR BLD MANUAL: 4 %
NEUTROPHILS # BLD MANUAL: 21.5 10E3/UL (ref 1.6–8.3)
NEUTROPHILS NFR BLD MANUAL: 63 %
NITRATE UR QL: NEGATIVE
P AXIS - MUSE: 82 DEGREES
PH UR STRIP: 5.5 [PH] (ref 5–7)
PLAT MORPH BLD: ABNORMAL
PLATELET # BLD AUTO: 124 10E3/UL (ref 150–450)
POTASSIUM SERPL-SCNC: 3.5 MMOL/L (ref 3.4–5.3)
PR INTERVAL - MUSE: 156 MS
PROMYELOCYTES # BLD MANUAL: 1.4 10E3/UL
PROMYELOCYTES NFR BLD MANUAL: 4 %
QRS DURATION - MUSE: 88 MS
QT - MUSE: 282 MS
QTC - MUSE: 354 MS
R AXIS - MUSE: 81 DEGREES
RBC # BLD AUTO: 2.66 10E6/UL (ref 3.8–5.2)
RBC MORPH BLD: ABNORMAL
RBC URINE: 1 /HPF
SMUDGE CELLS BLD QL SMEAR: PRESENT
SODIUM SERPL-SCNC: 141 MMOL/L (ref 136–145)
SP GR UR STRIP: 1.01 (ref 1–1.03)
SQUAMOUS EPITHELIAL: 1 /HPF
SYSTOLIC BLOOD PRESSURE - MUSE: NORMAL MMHG
T AXIS - MUSE: 16 DEGREES
UFH PPP CHRO-ACNC: 0.45 IU/ML
UFH PPP CHRO-ACNC: 0.48 IU/ML
UROBILINOGEN UR STRIP-MCNC: NORMAL MG/DL
VENTRICULAR RATE- MUSE: 95 BPM
WBC # BLD AUTO: 34.2 10E3/UL (ref 4–11)
WBC URINE: 2 /HPF

## 2023-02-10 PROCEDURE — 99232 SBSQ HOSP IP/OBS MODERATE 35: CPT | Performed by: INTERNAL MEDICINE

## 2023-02-10 PROCEDURE — 120N000001 HC R&B MED SURG/OB

## 2023-02-10 PROCEDURE — 97530 THERAPEUTIC ACTIVITIES: CPT | Mod: GO

## 2023-02-10 PROCEDURE — 250N000011 HC RX IP 250 OP 636: Performed by: HOSPITALIST

## 2023-02-10 PROCEDURE — 250N000013 HC RX MED GY IP 250 OP 250 PS 637: Performed by: NURSE PRACTITIONER

## 2023-02-10 PROCEDURE — 250N000013 HC RX MED GY IP 250 OP 250 PS 637: Performed by: HOSPITALIST

## 2023-02-10 PROCEDURE — 89190 NASAL SMEAR FOR EOSINOPHILS: CPT | Performed by: INTERNAL MEDICINE

## 2023-02-10 PROCEDURE — 97110 THERAPEUTIC EXERCISES: CPT | Mod: GO

## 2023-02-10 PROCEDURE — 81001 URINALYSIS AUTO W/SCOPE: CPT | Performed by: INTERNAL MEDICINE

## 2023-02-10 PROCEDURE — 250N000013 HC RX MED GY IP 250 OP 250 PS 637: Performed by: INTERNAL MEDICINE

## 2023-02-10 PROCEDURE — 85007 BL SMEAR W/DIFF WBC COUNT: CPT | Performed by: HOSPITALIST

## 2023-02-10 PROCEDURE — 36415 COLL VENOUS BLD VENIPUNCTURE: CPT | Performed by: HOSPITALIST

## 2023-02-10 PROCEDURE — 82310 ASSAY OF CALCIUM: CPT | Performed by: HOSPITALIST

## 2023-02-10 PROCEDURE — 97110 THERAPEUTIC EXERCISES: CPT | Mod: GP

## 2023-02-10 PROCEDURE — 85520 HEPARIN ASSAY: CPT | Performed by: HOSPITALIST

## 2023-02-10 PROCEDURE — 85027 COMPLETE CBC AUTOMATED: CPT | Performed by: HOSPITALIST

## 2023-02-10 PROCEDURE — 85610 PROTHROMBIN TIME: CPT | Performed by: HOSPITALIST

## 2023-02-10 PROCEDURE — 97530 THERAPEUTIC ACTIVITIES: CPT | Mod: GP

## 2023-02-10 PROCEDURE — 250N000011 HC RX IP 250 OP 636: Performed by: PHYSICIAN ASSISTANT

## 2023-02-10 RX ORDER — WARFARIN SODIUM 2.5 MG/1
2.5 TABLET ORAL
Status: COMPLETED | OUTPATIENT
Start: 2023-02-10 | End: 2023-02-10

## 2023-02-10 RX ADMIN — MICONAZOLE NITRATE: 20 CREAM TOPICAL at 20:49

## 2023-02-10 RX ADMIN — HEPARIN SODIUM 1800 UNITS/HR: 10000 INJECTION, SOLUTION INTRAVENOUS at 04:37

## 2023-02-10 RX ADMIN — ARIPIPRAZOLE 5 MG: 5 TABLET ORAL at 09:17

## 2023-02-10 RX ADMIN — DULOXETINE HYDROCHLORIDE 120 MG: 60 CAPSULE, DELAYED RELEASE ORAL at 09:17

## 2023-02-10 RX ADMIN — ATORVASTATIN CALCIUM 40 MG: 40 TABLET, FILM COATED ORAL at 20:48

## 2023-02-10 RX ADMIN — WARFARIN SODIUM 2.5 MG: 2.5 TABLET ORAL at 18:37

## 2023-02-10 RX ADMIN — NYSTATIN 500000 UNITS: 100000 SUSPENSION ORAL at 18:37

## 2023-02-10 RX ADMIN — MICONAZOLE NITRATE: 20 CREAM TOPICAL at 05:00

## 2023-02-10 RX ADMIN — GABAPENTIN 100 MG: 100 CAPSULE ORAL at 14:18

## 2023-02-10 RX ADMIN — GABAPENTIN 100 MG: 100 CAPSULE ORAL at 09:17

## 2023-02-10 RX ADMIN — HEPARIN SODIUM 1800 UNITS/HR: 10000 INJECTION, SOLUTION INTRAVENOUS at 17:17

## 2023-02-10 RX ADMIN — NYSTATIN 500000 UNITS: 100000 SUSPENSION ORAL at 12:43

## 2023-02-10 RX ADMIN — MICONAZOLE NITRATE: 20 CREAM TOPICAL at 12:46

## 2023-02-10 RX ADMIN — GABAPENTIN 100 MG: 100 CAPSULE ORAL at 20:48

## 2023-02-10 RX ADMIN — HYDROMORPHONE HYDROCHLORIDE 0.2 MG: 0.2 INJECTION, SOLUTION INTRAMUSCULAR; INTRAVENOUS; SUBCUTANEOUS at 17:01

## 2023-02-10 RX ADMIN — OXYCODONE HYDROCHLORIDE 2.5 MG: 5 TABLET ORAL at 22:06

## 2023-02-10 RX ADMIN — FOLIC ACID 2 MG: 1 TABLET ORAL at 09:17

## 2023-02-10 RX ADMIN — PANTOPRAZOLE SODIUM 40 MG: 40 TABLET, DELAYED RELEASE ORAL at 16:19

## 2023-02-10 RX ADMIN — HYDROMORPHONE HYDROCHLORIDE 0.2 MG: 0.2 INJECTION, SOLUTION INTRAMUSCULAR; INTRAVENOUS; SUBCUTANEOUS at 04:05

## 2023-02-10 RX ADMIN — NYSTATIN 500000 UNITS: 100000 SUSPENSION ORAL at 22:06

## 2023-02-10 RX ADMIN — NYSTATIN 500000 UNITS: 100000 SUSPENSION ORAL at 09:17

## 2023-02-10 RX ADMIN — PANTOPRAZOLE SODIUM 40 MG: 40 TABLET, DELAYED RELEASE ORAL at 06:51

## 2023-02-10 RX ADMIN — MULTIPLE VITAMINS W/ MINERALS TAB 1 TABLET: TAB at 09:17

## 2023-02-10 ASSESSMENT — ACTIVITIES OF DAILY LIVING (ADL)
ADLS_ACUITY_SCORE: 62
ADLS_ACUITY_SCORE: 58
ADLS_ACUITY_SCORE: 58
ADLS_ACUITY_SCORE: 62

## 2023-02-10 NOTE — PROGRESS NOTES
Assessment and Plan:   KRISS: thought to be prerenal KRISS with probable progression to ATN.   UO yest 2650 ml. Lab today with Na 141, K 3.5, HCO3 25, Cr 2.57 eGFR 21. Cr has been increasing since 2/2/23. 2/8/23 U/A 1.012, 100 prot lg bld  2 WBC 93 RBC.CT 1/29 nl kidneys.     Wt stable over last 5 days.     Date/Time Weight Weight Method   02/08/23 0601 136.5 kg (301 lb) Bed scale   02/07/23 0638 136 kg (299 lb 13.2 oz) Bed scale   02/06/23 0630 136.5 kg (301 lb) Bed scale   02/05/23 0618 136.5 kg (301 lb) Bed scale   02/04/23 0615 136.5 kg (301 lb) Standing scale   02/03/23 0514 139.3 kg (307 lb) Bed scale   02/02/23 0700 133.8 kg (295 lb) Bed scale   01/29/23 1935 129 kg (284 lb 6.3 oz)      No recent obvious nephrotoxins.  Will recheck FENa  And Ueos.   Follow labs.             Interval History:   Obesity  RA on methotrexate: likely cause of bone marrow suppression.   PEA arrest due to PE, admitted with severe pancytopenia, sepsis.                  Review of Systems:   Denies dehydration, no N or V.Taking po well.   No SOB or chest/abd pain.          Medications:       ARIPiprazole  5 mg Oral Daily     atorvastatin  40 mg Oral QPM     DULoxetine  120 mg Oral Daily     folic acid  2 mg Oral Daily     gabapentin  100 mg Oral TID     miconazole   Topical BID     multivitamin w/minerals  1 tablet Oral Daily     nystatin  500,000 Units Swish & Spit 4x Daily     pantoprazole  40 mg Oral BID AC     sodium chloride (PF)  3 mL Intracatheter Q8H     warfarin ANTICOAGULANT  2.5 mg Oral ONCE at 18:00       heparin 1,800 Units/hr (02/10/23 0900)     Warfarin Therapy Reminder       Current active medications and PTA medications reviewed, see medication list for details.            Physical Exam:   Vitals were reviewed  Patient Vitals for the past 24 hrs:   BP Temp Temp src Pulse Resp SpO2   02/10/23 0808 116/63 98.6  F (37  C) Oral 79 18 94 %   02/09/23 2335 106/56 98.2  F (36.8  C) Oral (!) 48 18 93 %   02/09/23 1543  118/56 97.7  F (36.5  C) Oral 52 18 95 %       Temp:  [97.7  F (36.5  C)-98.6  F (37  C)] 98.6  F (37  C)  Pulse:  [48-79] 79  Resp:  [18] 18  BP: (106-118)/(56-63) 116/63  SpO2:  [93 %-95 %] 94 %    Temperatures:  Current - Temp: 98.6  F (37  C); Max - Temp  Av.2  F (36.8  C)  Min: 97.7  F (36.5  C)  Max: 98.6  F (37  C)  Respiration range: Resp  Av  Min: 18  Max: 18  Pulse range: Pulse  Av.7  Min: 48  Max: 79  Blood pressure range: Systolic (24hrs), Av , Min:106 , Max:118   ; Diastolic (24hrs), Av, Min:56, Max:63    Pulse oximetry range: SpO2  Av %  Min: 93 %  Max: 95 %    I/O last 3 completed shifts:  In: 340 [P.O.:340]  Out: 2250 [Urine:2250]      Intake/Output Summary (Last 24 hours) at 2/10/2023 1258  Last data filed at 2/10/2023 0921  Gross per 24 hour   Intake 100 ml   Output 3050 ml   Net -2950 ml       Alert, obese  Lungs with clear ant BS  Cor RRR nl S1 S2 no rub  Abd obese  LE no edema       Wt Readings from Last 4 Encounters:   23 136.5 kg (301 lb)   23 129.3 kg (285 lb)   22 129.3 kg (285 lb)   11/15/22 132.5 kg (292 lb)          Data:          Lab Results   Component Value Date     02/10/2023     2023     2023     2021     2020     2020    Lab Results   Component Value Date    CHLORIDE 108 02/10/2023    CHLORIDE 103 2023    CHLORIDE 102 2023    CHLORIDE 109 2022    CHLORIDE 111 2021    CHLORIDE 106 2020    CHLORIDE 106 2020    Lab Results   Component Value Date    BUN 36.5 02/10/2023    BUN 38.6 2023    BUN 40.8 2023    BUN 29 2022    BUN 14 2021    BUN 12 2020    BUN 17 2020      Lab Results   Component Value Date    POTASSIUM 3.5 02/10/2023    POTASSIUM 3.6 2023    POTASSIUM 3.3 2023    POTASSIUM 4.7 2022    POTASSIUM 4.0 2021    POTASSIUM 4.0 2020    POTASSIUM 3.9 2020    Lab  Results   Component Value Date    CO2 25 02/10/2023    CO2 25 02/09/2023    CO2 24 02/08/2023    CO2 26 05/24/2022    CO2 25 06/16/2021    CO2 26 09/24/2020    CO2 23 06/12/2020    Lab Results   Component Value Date    CR 2.57 02/10/2023    CR 2.57 02/09/2023    CR 2.49 02/08/2023    CR 0.74 06/16/2021    CR 0.74 01/13/2021    CR 0.73 09/24/2020        Recent Labs   Lab Test 02/10/23  0730 02/09/23  1345 02/09/23  0758   WBC 34.2* 28.0* 22.3*   HGB 7.7* 8.2* 7.4*   HCT 24.2* 26.0* 22.9*   MCV 91 90 90   * 93* 102*     Recent Labs   Lab Test 01/28/23  2221 12/06/22  1049 05/24/22  0959   AST 28 12 14   ALT 13 12 15   ALKPHOS 57 65 70   BILITOTAL 0.7 0.3 0.4       Recent Labs   Lab Test 02/01/23  0734 01/31/23  0757 01/30/23 2043   MAG 2.0 2.2 2.4*     Recent Labs   Lab Test 10/04/19  0704 10/03/19  0604 10/02/19  0545   PHOS 4.2 4.3 4.4     Recent Labs   Lab Test 02/10/23  0730 02/09/23  0758 02/08/23  0703   MARIA INES 8.1* 7.9* 8.1*       Lab Results   Component Value Date    MARIA INES 8.1 (L) 02/10/2023     Lab Results   Component Value Date    WBC 34.2 (H) 02/10/2023    HGB 7.7 (L) 02/10/2023    HCT 24.2 (L) 02/10/2023    MCV 91 02/10/2023     (L) 02/10/2023     Lab Results   Component Value Date     02/10/2023    POTASSIUM 3.5 02/10/2023    CHLORIDE 108 (H) 02/10/2023    CO2 25 02/10/2023    GLC 95 02/10/2023     Lab Results   Component Value Date    BUN 36.5 (H) 02/10/2023    CR 2.57 (H) 02/10/2023     Lab Results   Component Value Date    MAG 2.0 02/01/2023     Lab Results   Component Value Date    PHOS 4.2 10/04/2019       Creatinine   Date Value Ref Range Status   02/10/2023 2.57 (H) 0.51 - 0.95 mg/dL Final   02/09/2023 2.57 (H) 0.51 - 0.95 mg/dL Final   02/08/2023 2.49 (H) 0.51 - 0.95 mg/dL Final   02/07/2023 2.29 (H) 0.51 - 0.95 mg/dL Final   02/05/2023 1.95 (H) 0.51 - 0.95 mg/dL Final   02/04/2023 1.94 (H) 0.51 - 0.95 mg/dL Final   06/16/2021 0.74 0.52 - 1.04 mg/dL Final   01/13/2021 0.74 0.52 -  1.04 mg/dL Final   09/24/2020 0.73 0.52 - 1.04 mg/dL Final   09/04/2020 0.76 0.52 - 1.04 mg/dL Final   06/12/2020 0.72 0.52 - 1.04 mg/dL Final   04/15/2020 0.70 0.52 - 1.04 mg/dL Final       Attestation:  I have reviewed today's vital signs, notes, medications, labs and imaging.     Oskar Lamar MD

## 2023-02-10 NOTE — PLAN OF CARE
Goal Outcome Evaluation:                 Outcome Evaluation: Diet: Regular.  Tolerating po.  Ordering meals TID.  Continue protein supplement with lunch and dinner meals.

## 2023-02-10 NOTE — PLAN OF CARE
Pt is A&Ox4. VSS on RA. C/o wound pain, gave PRN oxy x1 and premedicated for wound cares with IV dilaudid x1 . Has chronic cough. Incontinent of B&B, x1 BM, purewick in place. Wound cares completed POC. Up A2 with em-steady and lift, T/R q2h. On a regular diet, tolerating well, denies N/V. Good appetite. K replaced, recheck was 3.6. Worked with PT & OT today. Up in  Started heparin gtt. Nephrology, ID, heme/onc following. Discharge pending improvement.

## 2023-02-10 NOTE — PROGRESS NOTES
[facility]    Medicine Progress Note - Hospitalist Service    Date of Admission:  1/28/2023    Assessment & Plan   Shira Rodriguez is a 62-year-old female with rheumatoid arthritis on methotrexate, prior massive PE on warfarin, morbid obesity who presented on 1/28/2023 with severe sepsis.  She was found unresponsive in her home. She  Was hypotensive and had temp of 100.2 in route to hospital.         Severe sepsis-present on admission  Candida intertrigo  Neutropenic fever, resolved, now with leukocytosis  Lactic acidosis, Resolved   * Presented with lactic acidosis of 3.2, hypotension, HR of 105, WBC of 0.4, acute metabolic encephalopathy - met criteria for severe sepsis on admission; resolved with IVF and empiric antibiotics  * likely potential sources include panniculitis (significant redness under abd pannus and breasts) or mucositis (severe throat pain x2 days) and duodenitis with severe neutropenia.  * CT C/A/P showed small chronic left pleural effusion, small fluid around distal esophagus and duodenum  * TTE 1/30 with possible vegetation; LETA 1/31 negative for vegetation or dissection  * repeat CT chest 2/4 (due to cough) with slight increase in bilateral pleural effusions, mild increase in atelectasis   * ID consulted with extensive infectious work-up: positive fungitell (significance unclear), positive EBV IgG (but negative IgM and DNA)  * negative infectious work-up:  UA and culture, blood cultures, COVID/RSV/flu, Hep B&C, HIV, HSV, CMV, aspergillus, Blastomyces, histoplasmosis, anaplasma, Rickettsia, Ehrlichia   * completed five day course doxycycline 2/3 (empiric tx for tick-borne illness)  * completed 7-day course fluconazole 2/6 (for Candida intertrigo)  * febrile to 101 2/7, no new infectious symptoms, possibly due to marrow recovery per ID  * cefepime discontinued 2/8 following recovery of neutrophil count per ID     - unclear significance of Beta D glucan at this time, no  obvious systemic fungal infection     - blood cultures 2/7 ngtd  - remains afebrile; monitor off antibiotics  - current leukocytosis due to Neuopgen    Pancytopenia with severe leukopenia and neutropenia, moderate anemia and thrombocytopenia  Severe vitamin B12 deficiency and folate deficiency  * CBC wnl 12/7/2022; On admission 1/28 - Hb 9.4, WBC 0.4, ANC 0.1, platelets 59  * Iron studies 1/29 indicating anemia of chronic disease.  Reticulocyte count suppressed.  B12 undetectable and folate low at 2.8  * initiated on B12 and folate, ultimately started on leucovorin rescue 2/2-2/5 per Heme  * methotrexate level 1/30 undetectable   * Peripheral smear 1/29 showed moderate to marked pancytopenia. Negative for schistocytes and elevated fibrinogen argues against DIC. Negative for circulating blasts on scanning.   * flow cytometry shows polytypic B cells, no aberrant immunophenotype on T cells and rare to absent CD34 positive blasts  * elevated Kappa and lambda free light chains, monoclonal IgM immunoglobulin of lambda light chain type, IgM is elevated at 395 and IgG and IgA are within normal limits   * immunological work-up showed C3 and C4 wnl, ANCA and ds-DNA negative  *  s/p bone marrow biopsy on 2/3/2023 with no clear etiology (see report for details) but no current evidence for neoplasm or aplastic anemia - Heme suspects hypocellularity primarily due to methotrexate, B12 and folate deficiency  * received 1U PRBC 1/31 and 2/4  * received daily platelet transfusions 1/30-2/5 for plt <50K  * repeat B12 level 2/8 >4K; will need ongoing outpatient monitoring and monthly B12 injections  * received daily Neupogen 1/30-2/7; stopped with recovery of cell counts     - per Heme; transfuse for plt <20K or signs of bleeding; hgb <7 g/dl  - continue oral folate; outpatient monthly B12 injections  - counts recovering  - bone marrow cytogenetic studies pending        KRISS on CKD stage 4, resolved  Mild anion gap metabolic acidosis,  resolved   * Normal renal function in 2021, Cr 1.7 5/2022, more recent baseline creatinine 2-2.2.   Creatinine on admission 3.25.    * Nephrology consulted, suspect pre-renal with possible progression to ATN, treated with IVF with gradual improvement.  Neph signed off 2/3.   * Neph re-consulted 2/8 due to worsening renal function of unclear etiology, no improvement with fluid bolus     - Cr this morning 2.57  -Await further recommendation from nephrology, avoid nephrotoxins  -Monitor labs     Probable esophagitis  Probable duodenitis  Dysphagia   Mucositis   * she has been complaining of throat pain; oral ulcers presents on exam   * CT on admission showed small amount of fluid adjacent to distal thoracic esophagus and scattered posterior mediastinal lymph nodes along esophagus of uncertain etiology but could be secondary to esophagitis or other inflammatory process.  CT scan also showed small edema around second and third portion of duodenum.  - continue magic mouth wash and nystatin  - SLP following, has advanced to reg diet   - continue Protonix  - odynophagia has resolved and mouth pain improving, will not pursue GI involvement for EGD at this time        Panniculitis, improving  Candidal rash-under pannus, breasts, improving  * treated with fluconazole 1/30-2/6 per ID  - Wound care on board   - continue miconazole cream     Hypokalemia  - continue replacement protocol     Type II MI due to severe sepsis  * High-sensitivity troponin mildly elevated at 48 and repeat was normal and can be due to demand  * TTE 1/30 with normal LV function without WMA, RV with mildly decreased systolic function     History of massive PE causing PEA arrest, 9/2019  * last dose of warfarin was 1/29, but INR has remained therapeutic through 2/5.   - as above, significant discrepancy between GFR calculation with cystatin c vs Cr; and so was started on heparin gtt bridge to coumadin     Rheumatoid arthritis  * Historically on  "methotrexate, resumed about 2 weeks prior to admission due to worsening back and shoulder pain.   *Previous provider had sent inbox message to outpatient rheumatologist, Dr. Austin at [facility], on 2/8 and he plans for clinic to contact her to schedule f/u appt within 1 month  -Hold methotrexate, follow up with outpatient rheumatologist      COPD  chronic cough  * has chronic cough for years and follows with pulmonary at [facility]  * CXR 2/3 showed mild opacity at the left lung base is new since the previous exam - atelectasis vs pneumonia.   * CT chest 2/4 with findings as above   - no signs of acute exacerbation     Chronic major depression  Chronic pain  * see recent pain clinic visit summary from 1/17/2023 for background information  - Continue Abilify, Cymbalta and Neurontin     Essential hypertension  PTA lisinopril initially held due to KRISS and hypotension, which have resolved.  - remains normotensive at this time, resume as indicated     Morbid obesity, BMI 46  Estimated body mass index is 45.9 kg/m  as calculated from the following:    Height as of this encounter: 1.676 m (5' 6\").    Weight as of this encounter: 129 kg (284 lb 6.3 oz).     Acute metabolic encephalopathy secondary to severe sepsis, resolved  Admission head CT negative for acute pathology.  Returned to baseline mental status with treatment of sepsis.      Mild hypovolemic hyponatremia, resolved  Admission sodium 132, normalized with IVF.      Hypomagnesemia, resolved  * replaced per protocol     3 cm right adrenal nodule  * Noted on CT.  Was present on previous imaging as well.  Likely benign  * A.m. cortisol 1/29, normal at 43.9     Hypoalbuminemia, albumin 2.5     Hyperuricemia  * uric acid 8.9 on 1/29/23          Diet: Snacks/Supplements Adult: Other; L: gel20, D: magic cup; With Meals  Combination Diet Regular Diet; Thin Liquids (level 0) (no straws)    DVT Prophylaxis: Warfarin with heparin increase  [place]wood Catheter: Not present  Lines: None   " "  Cardiac Monitoring: None  Code Status: Full Code      Clinically Significant Risk Factors        # Hypokalemia: Lowest K = 3.3 mmol/L in last 2 days, will replace as needed       # Hypoalbuminemia: Lowest albumin = 2.5 g/dL at 1/28/2023 10:21 PM, will monitor as appropriate   # Thrombocytopenia: Lowest platelets = 93 in last 2 days, will monitor for bleeding         # Severe Obesity: Estimated body mass index is 48.58 kg/m  as calculated from the following:    Height as of this encounter: 1.676 m (5' 6\").    Weight as of this encounter: 136.5 kg (301 lb).          Disposition Plan     Expected Discharge Date: 02/13/2023                  Kourtney Lugo MD  Hospitalist Service  [facility]  Securely message with [employer] (more info)  Text page via [employer] Paging/Directory   ______________________________________________________________________    Interval History   Patient with no new complaints.  No new nursing concerns    Physical Exam   Vital Signs: Temp: 98.6  F (37  C) Temp src: Oral BP: 116/63 Pulse: 79   Resp: 18 SpO2: 94 % O2 Device: None (Room air)    Weight: 301 lbs 0 oz    Exam:  Constitutional: Awake, alert and no distress. Appears comfortable  Head: Normocephalic. No masses, lesions, tenderness or abnormalities  ENT: ENT exam normal, no neck nodes or sinus tenderness  Cardiovascular: RRR.  No murmurs, no rubs or JVD  Respiratory: Normal WOB,b/l equal air entry, no wheezes or crackles   Gastrointestinal: Abdomen soft, non-tender. BS normal. No masses, organomegaly  : Deferred  Extremities : No edema , no clubbing or cyanosis    Neurologic: Cranial nerves II-XII grossly intact , power symmetrical, Reflexes normal and symmetric. Sensation grossly WNL.      Medical Decision Making       44 MINUTES SPENT BY ME on the date of service doing chart review, history, exam, documentation & further activities per the note.      Data     I have personally reviewed the following data over the " past 24 hrs:    34.2 (H)  \   7.7 (L)   / 124 (L)     141 108 (H) 36.5 (H) /  95   3.5 25 2.57 (H) \       INR:  1.38 (H) PTT:  N/A   D-dimer:  N/A Fibrinogen:  N/A       Imaging results reviewed over the past 24 hrs:   No results found for this or any previous visit (from the past 24 hour(s)).  Recent Labs   Lab 02/10/23  0730 02/09/23  1500 02/09/23  1345 02/09/23  0758 02/08/23  1454 02/08/23  0703   WBC 34.2*  --  28.0* 22.3*  --  6.2   HGB 7.7*  --  8.2* 7.4*  --  7.5*   MCV 91  --  90 90  --  90   *  --  93* 102*  --  50*   INR 1.38*  --   --  1.47*  --  1.62*     --   --  136  --  139   POTASSIUM 3.5 3.6  --  3.3*   < > 3.2*   CHLORIDE 108*  --   --  103  --  102   CO2 25  --   --  25  --  24   BUN 36.5*  --   --  38.6*  --  40.8*   CR 2.57*  --   --  2.57*  --  2.49*   ANIONGAP 8  --   --  8  --  13   MARIA INES 8.1*  --   --  7.9*  --  8.1*   GLC 95  --   --  92  --  83    < > = values in this interval not displayed.

## 2023-02-10 NOTE — PROGRESS NOTES
Service Date: 02/10/2023    SUBJECTIVE:  Ms. Rodriguez is a 62-year-old female with pancytopenia.  Pancytopenia is multifactorial from hypocellular marrow from methotrexate, vitamin B12 deficiency and folate deficiency.  Anemia also due to chronic disease and renal disease.    The patient's pancytopenia is improving.  Now, she has leukocytosis from the Neupogen that she received.  Her hemoglobin is staying around 7.5.  Thrombocytopenia has improved and platelet today is 124.    The patient still has generalized weakness.  Fatigue is slightly less.  No headache.  No dizziness.  Her sore throat is improving.  No chest pain. No shortness of breath at rest.  No bleeding.    PHYSICAL EXAMINATION:    GENERAL:  She is alert and oriented.  Not in distress.  VITAL SIGNS:  Reviewed.  THROAT:  Mild erythema in the throat.  No ulcer.  No thrush.  SKIN:  No petechiae.  The rest of the systems not examined.    LABS:  Reviewed.    ASSESSMENT:    1.  A 62-year-old female with pancytopenia.  Pancytopenia is improving.  2.  Vitamin B12 deficiency  3.  Folate deficiency.  4.  Rheumatoid arthritis.  Methotrexate on hold because of pancytopenia.  5.  Pulmonary embolism.  6.  Sore throat, improving.  7.  Acute kidney injury.    PLAN:    1.  The patient's overall condition is slowly improving.      CBC reviewed with her.  I explained to her that her white count is high because of Neupogen.  Thrombocytopenia is better.  Anemia is stable.      I explained to her that with time, thrombocytopenia would resolve.  Leukocytosis would resolve. She is always going to be anemic because of renal disease and other medical problems.    2.  The patient will have CBC monitored in the hospital.  I do not expect her to require any transfusion support or growth factor.    3.  The patient has rheumatoid arthritis.  She is wondering about methotrexate.  Methotrexate can be used in the future at a lower dose.  Management as per her rheumatologist whom she will  see as an outpatient.    4.  She will continue on folic acid daily.    5.  The patient received vitamin B12 injection.  Vitamin B12 level is high.  Last vitamin B12 was on 2023. I am going to start her on oral vitamin B12 in next few days.     6.  The patient had a few questions, which were all answered.  Hematology/Oncology will see her intermittently.  Please call us with any questions over the weekend.    Priscilla Saab MD        D: 02/10/2023   T: 02/10/2023   MT: [id number]    Name:     XAVIER ROWELLKarla  MRN:      [id number]        Account:      [id number]   :      1960           Service Date: 02/10/2023       Document: [id number]

## 2023-02-10 NOTE — PLAN OF CARE
[id number]  A&Ox4. VSS except bradycardic. IV dilaudid given prior to wound care. R PIV infusing heparin @ 18mL/hr. Incontinent of B/B, purewick in place, no BM during shift. Wound cares completed around 0500. Ax2 lift/em steady. Regular diet. Discharge plan pending.

## 2023-02-10 NOTE — PROGRESS NOTES
Service Date: 02/09/2023    SUBJECTIVE:  Ms. Rodriguez is a 62-year-old female with pancytopenia.  Multiple workups done.  Bone marrow revealed hypocellular marrow.  Labs revealed severe vitamin B12 and folate deficiency.  Hypocellular marrow secondary to methotrexate.  The patient was on methotrexate for rheumatoid arthritis.      I explained to the patient that pancytopenia is multifactorial from hypocellular marrow, vitamin B12 deficiency and folate deficiency.  Her pancytopenia is now improving.  WBC is high at 28.  This is from Neupogen she received.  Anemia is better at 8.2.  Platelet is better at 93.    The patient also feels a little better.  Fatigue is less.  Pain in the throat is better.    No headache.  Some lightheadedness.  No chest pain.  No worsening nausea or vomiting.  No bleeding.    PHYSICAL EXAMINATION:    GENERAL:  She is alert and oriented.  Not in distress.  VITAL SIGNS:  Reviewed.  Rest of the systems not examined.    ASSESSMENT:    1.  A 62-year-old female with pancytopenia secondary to hypocellular marrow from methotrexate toxicity, vitamin B12 deficiency and folate deficiency.  Anemia also due to chronic disease.  2.  Vitamin B12 deficiency  3.  Folate deficiency.  4.  Rheumatoid arthritis.  5.  Pulmonary embolism.  6.  Sore throat, improving.    PLAN:    1.  The patient's overall condition is slowly improving.  CBC reviewed with her. That is also improving.  She was happy to know that.  I am hoping that her CBC will continue to improve and her thrombocytopenia will resolve.  Leukocytosis should also improve. CBC will be monitored daily while in the hospital.     2.  The patient has a history of pulmonary embolism.  Platelet is now in safe range.  Therapeutic anticoagulation can be resumed.    3.  For vitamin B12 deficiency, she needs to be on lifelong replacement.  Monthly vitamin B12 injection can be started in next few days.    4.  She will continue on oral folic acid.    5.  The patient  was wondering about rheumatoid arthritis treatment.  Treatment will be as per her rheumatologist. She will follow up as an outpatient.  In the future, she can go back on methotrexate. I would recommend starting at a lower dose and monitoring it closely.  This will be done by her rheumatologist.    6.  She had a few questions, which were all answered.  Case discussed with Dr. Kirby Sahni.    TOTAL TIME SPENT:  25 minutes.  Time spent in today's visit, review of chart/investigations today, communicating with other providers and documentation today.    Priscilla Saab MD        D: 2023   T: 2023   MT: [id number]    Name:     XAVIER ROWELL  MRN:      [id number]        Account:      [id number]   :      1960           Service Date: 2023       Document: [id number]

## 2023-02-10 NOTE — PROGRESS NOTES
Care Management Follow Up    Length of Stay (days): 12    Expected Discharge Date: 02/13/2023     Concerns to be Addressed:       Patient plan of care discussed at interdisciplinary rounds: Yes    Anticipated Discharge Disposition: Transitional Care     Anticipated Discharge Services: None  Anticipated Discharge DME: None    Patient/family educated on Medicare website which has current facility and service quality ratings: yes  Education Provided on the Discharge Plan:    Patient/Family in Agreement with the Plan: yes    Referrals Placed by CM/SW:    Private pay costs discussed: Not applicable    Additional Information:  SW received message from [facility] letting know that they will assess Monday after patient has had the weekend to stabilize and will let SW know of the decision.     Patient's sister also asked for a referral to be sent to [facility] for rehab. Referral sent via [facility].       CHARLENE Estes

## 2023-02-10 NOTE — PROGRESS NOTES
CLINICAL NUTRITION SERVICES - REASSESSMENT NOTE      Future/Additional Recommendations:     Continue protein supplement with lunch and dinner meals     Malnutrition:   % Weight Loss:  None noted  % Intake:  Decreased intake does not meet criteria for malnutrition  - pt tolerating po well, ordering full meals  Subcutaneous Fat Loss:  None observed  Muscle Loss:  None observed  Fluid Retention: +trace edema morena feet/ankles    Malnutrition Diagnosis: Patient does not meet two of the above criteria necessary for diagnosing malnutrition       EVALUATION OF PROGRESS TOWARD GOALS   Diet:    Regular, thin liquids  Lunch - GelateinPLUS  Dinner - magic cup      Intake/Tolerance:    Chart reviewed  Tolerating po well  Flowsheets reflect meal intake of 100%    Pt sleeping soundly when I stopped by this morning  Unsure if pt is taking the supplements - did not see any sitting in her room      NEW FINDINGS:     02/08/23 0601 136.5 kg (301 lb) Bed scale   02/07/23 0638 136 kg (299 lb 13.2 oz) Bed scale   02/06/23 0630 136.5 kg (301 lb) Bed scale   02/05/23 0618 136.5 kg (301 lb) Bed scale   02/04/23 0615 136.5 kg (301 lb) Standing scale   02/03/23 0514 139.3 kg (307 lb) Bed scale   02/02/23 0700 133.8 kg (295 lb) Bed scale   01/29/23 1935 129 kg (284 lb 6.3 oz) Bed scale       2/9: WOCN - Breast folds and pannus fold, gluteal fold, right posterior knee fold  Skin history and plan of care:  Patient on disability, patient has no assistance at home. Poor hygiene. Arrives with rashes and denudement within multiple skin folds. Many areas in pannus are evolving with large open ulcerations that are extremely painful  STATUS: significant improvement in size of wounds and decrease in moisture       Previous Goals (2/4):   Patient to consume % of nutritionally adequate meals BID+ with supplements over the next 5-7 days  Evaluation: pt taking 100% of her meals, unsure of supplement intake    Previous Nutrition Diagnosis (2/4):    Increased nutrient needs (protein) related to wound healing as evidenced by min protein needs of 1.2 g/kg, need for ONS  Evaluation: No change      MALNUTRITION  % Weight Loss:  None noted  % Intake:  Decreased intake does not meet criteria for malnutrition  - pt tolerating po well, ordering full meals  Subcutaneous Fat Loss:  None observed  Muscle Loss:  None observed  Fluid Retention: +trace edema morena feet/ankles    Malnutrition Diagnosis: Patient does not meet two of the above criteria necessary for diagnosing malnutrition      CURRENT NUTRITION DIAGNOSIS  Increased nutrient needs (protein) related to higher needs for wound healing as evidenced by pt with noted to have open ulcerations    INTERVENTIONS  Recommendations / Nutrition Prescription  Regular diet    Continue protein supplement with lunch and dinner meals    Goals  Pt to consume 75% meals and take 2 supplements per day      MONITORING AND EVALUATION:  Progress towards goals will be monitored and evaluated per protocol and Practice Guidelines

## 2023-02-10 NOTE — PROVIDER NOTIFICATION
MD Notification    Notified Person: MD    Notified Person Name: Priscilla Saab     Notification Date/Time: 2/10 1550    Notification Interaction: via phone    Purpose of Notification:   Vit B12 1g oral daily ordered on 2/10.  Vit B 12 level > 4000 on 2/7 after Vit B12 1g IM daily 1/29-2/4 for low B12 level.      Orders Received:  Oral order adjusted to start 2/14, a week after level.     Comments:  Provider wants to make sure B12 maintenance dosing is not missed on discharge or follow-up.

## 2023-02-11 LAB
ALBUMIN SERPL BCG-MCNC: 2.3 G/DL (ref 3.5–5.2)
ALP SERPL-CCNC: 88 U/L (ref 35–104)
ALT SERPL W P-5'-P-CCNC: 18 U/L (ref 10–35)
ANION GAP SERPL CALCULATED.3IONS-SCNC: 9 MMOL/L (ref 7–15)
AST SERPL W P-5'-P-CCNC: 43 U/L (ref 10–35)
BASOPHILS # BLD MANUAL: 0 10E3/UL (ref 0–0.2)
BASOPHILS NFR BLD MANUAL: 0 %
BILIRUB SERPL-MCNC: <0.2 MG/DL
BUN SERPL-MCNC: 33.4 MG/DL (ref 8–23)
CALCIUM SERPL-MCNC: 8.1 MG/DL (ref 8.8–10.2)
CHLORIDE SERPL-SCNC: 108 MMOL/L (ref 98–107)
CREAT SERPL-MCNC: 2.55 MG/DL (ref 0.51–0.95)
DEPRECATED HCO3 PLAS-SCNC: 25 MMOL/L (ref 22–29)
EOSINOPHIL # BLD MANUAL: 0 10E3/UL (ref 0–0.7)
EOSINOPHIL NFR BLD MANUAL: 0 %
ERYTHROCYTE [DISTWIDTH] IN BLOOD BY AUTOMATED COUNT: 14 % (ref 10–15)
GFR SERPL CREATININE-BSD FRML MDRD: 21 ML/MIN/1.73M2
GLUCOSE SERPL-MCNC: 94 MG/DL (ref 70–99)
HCT VFR BLD AUTO: 26.2 % (ref 35–47)
HGB BLD-MCNC: 8.3 G/DL (ref 11.7–15.7)
INR PPP: 1.3 (ref 0.85–1.15)
LYMPHOCYTES # BLD MANUAL: 3.9 10E3/UL (ref 0.8–5.3)
LYMPHOCYTES NFR BLD MANUAL: 10 %
MCH RBC QN AUTO: 28.9 PG (ref 26.5–33)
MCHC RBC AUTO-ENTMCNC: 31.7 G/DL (ref 31.5–36.5)
MCV RBC AUTO: 91 FL (ref 78–100)
METAMYELOCYTES # BLD MANUAL: 4.3 10E3/UL
METAMYELOCYTES NFR BLD MANUAL: 11 %
MONOCYTES # BLD MANUAL: 0.8 10E3/UL (ref 0–1.3)
MONOCYTES NFR BLD MANUAL: 2 %
MYELOCYTES # BLD MANUAL: 0.8 10E3/UL
MYELOCYTES NFR BLD MANUAL: 2 %
NEUTROPHILS # BLD MANUAL: 29.3 10E3/UL (ref 1.6–8.3)
NEUTROPHILS NFR BLD MANUAL: 75 %
PLAT MORPH BLD: ABNORMAL
PLATELET # BLD AUTO: 142 10E3/UL (ref 150–450)
POTASSIUM SERPL-SCNC: 3.3 MMOL/L (ref 3.4–5.3)
POTASSIUM SERPL-SCNC: 3.5 MMOL/L (ref 3.4–5.3)
PROT SERPL-MCNC: 5.4 G/DL (ref 6.4–8.3)
RBC # BLD AUTO: 2.87 10E6/UL (ref 3.8–5.2)
RBC MORPH BLD: ABNORMAL
SODIUM SERPL-SCNC: 142 MMOL/L (ref 136–145)
UFH PPP CHRO-ACNC: 0.49 IU/ML
WBC # BLD AUTO: 39 10E3/UL (ref 4–11)

## 2023-02-11 PROCEDURE — 250N000013 HC RX MED GY IP 250 OP 250 PS 637: Performed by: HOSPITALIST

## 2023-02-11 PROCEDURE — 250N000011 HC RX IP 250 OP 636: Performed by: HOSPITALIST

## 2023-02-11 PROCEDURE — 250N000013 HC RX MED GY IP 250 OP 250 PS 637: Performed by: INTERNAL MEDICINE

## 2023-02-11 PROCEDURE — 84132 ASSAY OF SERUM POTASSIUM: CPT | Performed by: INTERNAL MEDICINE

## 2023-02-11 PROCEDURE — 85007 BL SMEAR W/DIFF WBC COUNT: CPT | Performed by: HOSPITALIST

## 2023-02-11 PROCEDURE — 85610 PROTHROMBIN TIME: CPT | Performed by: HOSPITALIST

## 2023-02-11 PROCEDURE — 99232 SBSQ HOSP IP/OBS MODERATE 35: CPT | Performed by: INTERNAL MEDICINE

## 2023-02-11 PROCEDURE — 85027 COMPLETE CBC AUTOMATED: CPT | Performed by: HOSPITALIST

## 2023-02-11 PROCEDURE — 99207 PR NO BILLABLE SERVICE THIS VISIT: CPT | Performed by: INTERNAL MEDICINE

## 2023-02-11 PROCEDURE — 36415 COLL VENOUS BLD VENIPUNCTURE: CPT | Performed by: INTERNAL MEDICINE

## 2023-02-11 PROCEDURE — 250N000011 HC RX IP 250 OP 636: Performed by: PHYSICIAN ASSISTANT

## 2023-02-11 PROCEDURE — 80053 COMPREHEN METABOLIC PANEL: CPT | Performed by: HOSPITALIST

## 2023-02-11 PROCEDURE — 250N000013 HC RX MED GY IP 250 OP 250 PS 637: Performed by: NURSE PRACTITIONER

## 2023-02-11 PROCEDURE — 85520 HEPARIN ASSAY: CPT | Performed by: HOSPITALIST

## 2023-02-11 PROCEDURE — 36415 COLL VENOUS BLD VENIPUNCTURE: CPT | Performed by: HOSPITALIST

## 2023-02-11 PROCEDURE — 120N000001 HC R&B MED SURG/OB

## 2023-02-11 RX ORDER — POTASSIUM CHLORIDE 1500 MG/1
40 TABLET, EXTENDED RELEASE ORAL ONCE
Status: COMPLETED | OUTPATIENT
Start: 2023-02-11 | End: 2023-02-11

## 2023-02-11 RX ORDER — WARFARIN SODIUM 5 MG/1
5 TABLET ORAL
Status: COMPLETED | OUTPATIENT
Start: 2023-02-11 | End: 2023-02-11

## 2023-02-11 RX ADMIN — POTASSIUM CHLORIDE 40 MEQ: 1500 TABLET, EXTENDED RELEASE ORAL at 10:20

## 2023-02-11 RX ADMIN — ARIPIPRAZOLE 5 MG: 5 TABLET ORAL at 09:05

## 2023-02-11 RX ADMIN — FOLIC ACID 2 MG: 1 TABLET ORAL at 09:05

## 2023-02-11 RX ADMIN — OXYCODONE HYDROCHLORIDE 2.5 MG: 5 TABLET ORAL at 19:30

## 2023-02-11 RX ADMIN — ATORVASTATIN CALCIUM 40 MG: 40 TABLET, FILM COATED ORAL at 19:30

## 2023-02-11 RX ADMIN — NYSTATIN 500000 UNITS: 100000 SUSPENSION ORAL at 17:33

## 2023-02-11 RX ADMIN — PANTOPRAZOLE SODIUM 40 MG: 40 TABLET, DELAYED RELEASE ORAL at 07:01

## 2023-02-11 RX ADMIN — HYDROMORPHONE HYDROCHLORIDE 0.2 MG: 0.2 INJECTION, SOLUTION INTRAMUSCULAR; INTRAVENOUS; SUBCUTANEOUS at 16:05

## 2023-02-11 RX ADMIN — NYSTATIN 500000 UNITS: 100000 SUSPENSION ORAL at 21:59

## 2023-02-11 RX ADMIN — HEPARIN SODIUM 1800 UNITS/HR: 10000 INJECTION, SOLUTION INTRAVENOUS at 22:03

## 2023-02-11 RX ADMIN — GABAPENTIN 100 MG: 100 CAPSULE ORAL at 13:06

## 2023-02-11 RX ADMIN — NYSTATIN 500000 UNITS: 100000 SUSPENSION ORAL at 13:06

## 2023-02-11 RX ADMIN — WARFARIN SODIUM 5 MG: 5 TABLET ORAL at 17:33

## 2023-02-11 RX ADMIN — HEPARIN SODIUM 1800 UNITS/HR: 10000 INJECTION, SOLUTION INTRAVENOUS at 07:33

## 2023-02-11 RX ADMIN — MICONAZOLE NITRATE: 20 CREAM TOPICAL at 16:02

## 2023-02-11 RX ADMIN — GABAPENTIN 100 MG: 100 CAPSULE ORAL at 09:05

## 2023-02-11 RX ADMIN — PANTOPRAZOLE SODIUM 40 MG: 40 TABLET, DELAYED RELEASE ORAL at 17:33

## 2023-02-11 RX ADMIN — MULTIPLE VITAMINS W/ MINERALS TAB 1 TABLET: TAB at 09:06

## 2023-02-11 RX ADMIN — NYSTATIN 500000 UNITS: 100000 SUSPENSION ORAL at 09:06

## 2023-02-11 RX ADMIN — DULOXETINE HYDROCHLORIDE 120 MG: 60 CAPSULE, DELAYED RELEASE ORAL at 09:05

## 2023-02-11 RX ADMIN — OXYCODONE HYDROCHLORIDE 2.5 MG: 5 TABLET ORAL at 14:36

## 2023-02-11 RX ADMIN — GABAPENTIN 100 MG: 100 CAPSULE ORAL at 19:30

## 2023-02-11 ASSESSMENT — ACTIVITIES OF DAILY LIVING (ADL)
ADLS_ACUITY_SCORE: 62

## 2023-02-11 NOTE — PROGRESS NOTES
Labs improving. neupogen discontinued. Oncology will sign off please call if questions arise    Juan Chamberlain MD   [phone]

## 2023-02-11 NOTE — PLAN OF CARE
Goal Outcome Evaluation:       0700-1930:    A/Ox4. VSS on RA ex susan at times. Incont B/B, purewick in place with adequate UOP.  R PIV infusing heparin @ 1800 units/hr. Lab Hep Xa AM rechecked- NO rate change required. Up A2 with lift/em steady. Weak BLE.  Up in chair for 2 hours. On regular diet, no straw, fair appetite.  Wound cares done per plan of care.  Pt worked with PT/OT today. Neph/ Hem/Onc.Discharge to TCU pending placement.

## 2023-02-11 NOTE — PLAN OF CARE
[id number]    A/Ox4. VSS on RA. Incont B/B, purewick in place w/ adequate UOP. R PIV infusing heparin @1800 units/hr. Lab Hep Xa to be checked in the AM. Up A2 w/ lift. Deja steady. Regular diet, no straw, thin liquids. Wound cares under folds done per plan of care. PRN oxy given x1 for pain after wound care. T/Rq2hr. Can help turn very well. PT/OT following. Neph/Heme/Onc following. Discharge to TCU pending placement.

## 2023-02-11 NOTE — PROGRESS NOTES
[facility]    Medicine Progress Note - Hospitalist Service    Date of Admission:  1/28/2023    Assessment & Plan   Shira Rodriguez is a 62-year-old female with rheumatoid arthritis on methotrexate, prior massive PE on warfarin, morbid obesity who presented on 1/28/2023 with severe sepsis.  She was found unresponsive in her home. She  Was hypotensive and had temp of 100.2 in route to hospital.      Severe sepsis-present on admission  Candida intertrigo  Neutropenic fever, resolved, now with leukocytosis likely secondary to Neupogen  Lactic acidosis, Resolved   * Presented with lactic acidosis of 3.2, hypotension, HR of 105, WBC of 0.4, acute metabolic encephalopathy - met criteria for severe sepsis on admission; resolved with IVF and empiric antibiotics  * likely potential sources include panniculitis (significant redness under abd pannus and breasts) or mucositis (severe throat pain x2 days) and duodenitis with severe neutropenia.  * CT C/A/P showed small chronic left pleural effusion, small fluid around distal esophagus and duodenum  * TTE 1/30 with possible vegetation; LETA 1/31 negative for vegetation or dissection  * repeat CT chest 2/4 (due to cough) with slight increase in bilateral pleural effusions, mild increase in atelectasis   * ID consulted with extensive infectious work-up: positive fungitell (significance unclear), positive EBV IgG (but negative IgM and DNA)  * negative infectious work-up:  UA and culture, blood cultures, COVID/RSV/flu, Hep B&C, HIV, HSV, CMV, aspergillus, Blastomyces, histoplasmosis, anaplasma, Rickettsia, Ehrlichia   * completed five day course doxycycline 2/3 (empiric tx for tick-borne illness)  * completed 7-day course fluconazole 2/6 (for Candida intertrigo)  * cefepime discontinued 2/8 following recovery of neutrophil count per ID     - unclear significance of Beta D glucan at this time, no obvious systemic fungal infection     - blood cultures 2/7  ngtd  - remains afebrile; monitor off antibiotics  - current leukocytosis due to Neuopgen    Pancytopenia with severe leukopenia and neutropenia, moderate anemia and thrombocytopenia  Severe vitamin B12 deficiency and folate deficiency  * CBC wnl 12/7/2022; On admission 1/28 - Hb 9.4, WBC 0.4, ANC 0.1, platelets 59  * Iron studies 1/29 indicating anemia of chronic disease.  Reticulocyte count suppressed.  B12 undetectable and folate low at 2.8  * initiated on B12 and folate, ultimately started on leucovorin rescue 2/2-2/5 per Heme  * methotrexate level 1/30 undetectable   * Peripheral smear 1/29 showed moderate to marked pancytopenia. Negative for schistocytes and elevated fibrinogen argues against DIC. Negative for circulating blasts on scanning.   * flow cytometry shows polytypic B cells, no aberrant immunophenotype on T cells and rare to absent CD34 positive blasts  * elevated Kappa and lambda free light chains, monoclonal IgM immunoglobulin of lambda light chain type, IgM is elevated at 395 and IgG and IgA are within normal limits   * immunological work-up showed C3 and C4 wnl, ANCA and ds-DNA negative  *  s/p bone marrow biopsy on 2/3/2023 with no clear etiology (see report for details) but no current evidence for neoplasm or aplastic anemia - Heme suspects hypocellularity primarily due to methotrexate, B12 and folate deficiency  * received 1U PRBC 1/31 and 2/4  * received daily platelet transfusions 1/30-2/5 for plt <50K  * repeat B12 level 2/8 >4K; will need ongoing outpatient monitoring and monthly B12 injections  * received daily Neupogen 1/30-2/7; stopped with recovery of cell counts     - per Heme; transfuse for plt <20K or signs of bleeding; hgb <7 g/dl  - continue oral folate; outpatient plan for discharging with B12 supplement  - counts recovering  - bone marrow cytogenetic studies pending        KRISS on CKD stage 4, resolved  Mild anion gap metabolic acidosis, resolved   * Normal renal function in  2021, Cr 1.7 5/2022, more recent baseline creatinine 2-2.2.   Creatinine on admission 3.25.    * Nephrology consulted, suspect pre-renal with possible progression to ATN, treated with IVF with gradual improvement.  Neph signed off 2/3.   * Neph re-consulted 2/8 due to worsening renal function of unclear etiology, no improvement with fluid bolus     - Cr has remained stable around 2.5 for last several days  -Urine eosinophil negative  -Await further recommendation from nephrology, avoid nephrotoxins  -Monitor labs     Probable esophagitis  Probable duodenitis  Dysphagia   Mucositis   * she has been complaining of throat pain; oral ulcers presents on exam   * CT on admission showed small amount of fluid adjacent to distal thoracic esophagus and scattered posterior mediastinal lymph nodes along esophagus of uncertain etiology but could be secondary to esophagitis or other inflammatory process.  CT scan also showed small edema around second and third portion of duodenum.  - SLP following, has advanced to reg diet   - continue Protonix  - odynophagia has resolved and mouth pain improving     Panniculitis, improving  Candidal rash-under pannus, breasts, improving  * treated with fluconazole 1/30-2/6 per ID  - Wound care on board   - continue miconazole cream     Hypokalemia  - continue replacement protocol     Type II MI due to severe sepsis  * High-sensitivity troponin mildly elevated at 48 and repeat was normal and can be due to demand  * TTE 1/30 with normal LV function without WMA, RV with mildly decreased systolic function     History of massive PE causing PEA arrest, 9/2019  * last dose of warfarin was 1/29, but INR has remained therapeutic through 2/5.   - as above, significant discrepancy between GFR calculation with cystatin c vs Cr; and so was started on heparin gtt bridge to coumadin     Rheumatoid arthritis  * Historically on methotrexate, resumed about 2 weeks prior to admission due to worsening back and  "shoulder pain.   *Previous provider had sent inbox message to outpatient rheumatologist, Dr. Austin at [facility], on 2/8 and he plans for clinic to contact her to schedule f/u appt within 1 month  -Hold methotrexate, follow up with outpatient rheumatologist      COPD  chronic cough  * has chronic cough for years and follows with pulmonary at [facility]  * CXR 2/3 showed mild opacity at the left lung base is new since the previous exam - atelectasis vs pneumonia.   * CT chest 2/4 with findings as above   - no signs of acute exacerbation     Chronic major depression  Chronic pain  * see recent pain clinic visit summary from 1/17/2023 for background information  - Continue Abilify, Cymbalta and Neurontin     Essential hypertension  PTA lisinopril initially held due to KRISS and hypotension, which have resolved.  - remains normotensive at this time, resume as indicated     Morbid obesity, BMI 46  Estimated body mass index is 45.9 kg/m  as calculated from the following:    Height as of this encounter: 1.676 m (5' 6\").    Weight as of this encounter: 129 kg (284 lb 6.3 oz).     Acute metabolic encephalopathy secondary to severe sepsis, resolved  Admission head CT negative for acute pathology.  Returned to baseline mental status with treatment of sepsis.      Mild hypovolemic hyponatremia, resolved  Admission sodium 132, normalized with IVF.      Hypomagnesemia, resolved  * replaced per protocol     3 cm right adrenal nodule  * Noted on CT.  Was present on previous imaging as well.  Likely benign  * A.m. cortisol 1/29, normal at 43.9     Hypoalbuminemia, albumin 2.5     Hyperuricemia  * uric acid 8.9 on 1/29/23          Diet: Snacks/Supplements Adult: Other; L: gel20, D: magic cup; With Meals  Combination Diet Regular Diet; Thin Liquids (level 0) (no straws)    DVT Prophylaxis: Warfarin with heparin increase  [place]wood Catheter: Not present  Lines: None     Cardiac Monitoring: None  Code Status: Full Code      Clinically Significant Risk " "Factors        # Hypokalemia: Lowest K = 3.3 mmol/L in last 2 days, will replace as needed       # Hypoalbuminemia: Lowest albumin = 2.3 g/dL at 2/11/2023  7:49 AM, will monitor as appropriate   # Thrombocytopenia: Lowest platelets = 124 in last 2 days, will monitor for bleeding         # Severe Obesity: Estimated body mass index is 48.58 kg/m  as calculated from the following:    Height as of this encounter: 1.676 m (5' 6\").    Weight as of this encounter: 136.5 kg (301 lb).          Disposition Plan      Expected Discharge Date: 02/13/2023                  Kourtney Lugo MD  Hospitalist Service  [facility]  Securely message with [employer] (more info)  Text page via [employer] Paging/Directory   ______________________________________________________________________    Interval History   Patient with no new complaints.  No new nursing concerns    Physical Exam   Vital Signs: Temp: 97.7  F (36.5  C) Temp src: Oral BP: 121/63 Pulse: 85   Resp: 18 SpO2: 97 % O2 Device: None (Room air)    Weight: 301 lbs 0 oz    Exam:  Constitutional: Awake, alert and no distress. Appears comfortable  Head: Normocephalic. No masses, lesions, tenderness or abnormalities  ENT: ENT exam normal, no neck nodes or sinus tenderness  Cardiovascular: RRR.  No murmurs, no rubs or JVD  Respiratory: Normal WOB,b/l equal air entry, no wheezes or crackles   Gastrointestinal: Abdomen soft, non-tender. BS normal. No masses, organomegaly  : Deferred  Extremities : No edema , no clubbing or cyanosis    Neurologic: Cranial nerves II-XII grossly intact , power symmetrical, Reflexes normal and symmetric. Sensation grossly WNL.      Medical Decision Making       40 MINUTES SPENT BY ME on the date of service doing chart review, history, exam, documentation & further activities per the note.      Data     I have personally reviewed the following data over the past 24 hrs:    39.0 (H)  \   8.3 (L)   / 142 (L)     142 108 (H) 33.4 (H) /  94 "   3.3 (L) 25 2.55 (H) \       ALT: 18 AST: 43 (H) AP: 88 TBILI: <0.2   ALB: 2.3 (L) TOT PROTEIN: 5.4 (L) LIPASE: N/A       INR:  1.30 (H) PTT:  N/A   D-dimer:  N/A Fibrinogen:  N/A       Imaging results reviewed over the past 24 hrs:   No results found for this or any previous visit (from the past 24 hour(s)).  Recent Labs   Lab 02/11/23  0749 02/10/23  0730 02/09/23  1500 02/09/23  1345 02/09/23  0758   WBC 39.0* 34.2*  --  28.0* 22.3*   HGB 8.3* 7.7*  --  8.2* 7.4*   MCV 91 91  --  90 90   * 124*  --  93* 102*   INR 1.30* 1.38*  --   --  1.47*    141  --   --  136   POTASSIUM 3.3* 3.5 3.6  --  3.3*   CHLORIDE 108* 108*  --   --  103   CO2 25 25  --   --  25   BUN 33.4* 36.5*  --   --  38.6*   CR 2.55* 2.57*  --   --  2.57*   ANIONGAP 9 8  --   --  8   MARIA INES 8.1* 8.1*  --   --  7.9*   GLC 94 95  --   --  92   ALBUMIN 2.3*  --   --   --   --    PROTTOTAL 5.4*  --   --   --   --    BILITOTAL <0.2  --   --   --   --    ALKPHOS 88  --   --   --   --    ALT 18  --   --   --   --    AST 43*  --   --   --   --

## 2023-02-11 NOTE — PROVIDER NOTIFICATION
MD Notification    Notified Person: MD    Notified Person Name: Kourtney Lugo    Notification Date/Time: 2/11/2023 @0948    Notification Interaction:   eden  Purpose of Notification:   FYI: room 825, WBC today is 39, trending up. Also, Hep Xa lab today is in the goal range, same with previous results. Heparin IV infusing continuously same rate.    Orders Received: continue to monitor. MD came and talked with pt.     Comments:

## 2023-02-11 NOTE — PROGRESS NOTES
Brief Nephrology Note    Chart reviewed. Cr appears stable last few days. Hopefully this represents plateau phase of ATN and we will start to see improvement in coming days. No new recommendations today. Great UOP.     Juan Lott MD

## 2023-02-11 NOTE — PLAN OF CARE
Goal Outcome Evaluation:       0700-1930:    A/Ox4. VSS on RA. Denies SOB/N/V. C/o pain at wound areas at skin folds, managed with PRN Oxydone x1 with little improvement. Incont B/B, purewick in place w/ adequate UOP. R PIV infusing heparin @1800 units/hr. Lab Hep Xa checked in the AM- no rate change required. Up A2 w/ lift or with Deja molina. Q2H T/R as pt allows, pt can turn with 1 assist. Regular diet, no straw, good appetite. Wound cares under folds done this shift per plan of care. Pre-medicated with pain med before wound cares per pt's request, which is helpful. Plts 142, WBC 39.0 , Hgb 8.3, Hep Xa 0.49, INR 1.30. K= 3.3, K replacement done x1, rechecked K=3.5 . Recheck BMP tomorrow AM lab ordered. PT/OT following. Neph/Heme/Onc following. Discharge to TCU pending placement.

## 2023-02-12 LAB
ALBUMIN SERPL BCG-MCNC: 2.3 G/DL (ref 3.5–5.2)
ALP SERPL-CCNC: 83 U/L (ref 35–104)
ALT SERPL W P-5'-P-CCNC: 20 U/L (ref 10–35)
ANION GAP SERPL CALCULATED.3IONS-SCNC: 7 MMOL/L (ref 7–15)
AST SERPL W P-5'-P-CCNC: 43 U/L (ref 10–35)
BACTERIA BLD CULT: NO GROWTH
BACTERIA BLD CULT: NO GROWTH
BASOPHILS # BLD MANUAL: 0 10E3/UL (ref 0–0.2)
BASOPHILS NFR BLD MANUAL: 0 %
BILIRUB SERPL-MCNC: <0.2 MG/DL
BUN SERPL-MCNC: 33.7 MG/DL (ref 8–23)
CALCIUM SERPL-MCNC: 8.2 MG/DL (ref 8.8–10.2)
CHLORIDE SERPL-SCNC: 110 MMOL/L (ref 98–107)
CREAT SERPL-MCNC: 2.33 MG/DL (ref 0.51–0.95)
DEPRECATED HCO3 PLAS-SCNC: 25 MMOL/L (ref 22–29)
EOSINOPHIL # BLD MANUAL: 0 10E3/UL (ref 0–0.7)
EOSINOPHIL NFR BLD MANUAL: 0 %
ERYTHROCYTE [DISTWIDTH] IN BLOOD BY AUTOMATED COUNT: 14.1 % (ref 10–15)
GFR SERPL CREATININE-BSD FRML MDRD: 23 ML/MIN/1.73M2
GLUCOSE SERPL-MCNC: 101 MG/DL (ref 70–99)
HCT VFR BLD AUTO: 26.2 % (ref 35–47)
HGB BLD-MCNC: 8.3 G/DL (ref 11.7–15.7)
INR PPP: 1.33 (ref 0.85–1.15)
LYMPHOCYTES # BLD MANUAL: 4.4 10E3/UL (ref 0.8–5.3)
LYMPHOCYTES NFR BLD MANUAL: 13 %
MCH RBC QN AUTO: 29.3 PG (ref 26.5–33)
MCHC RBC AUTO-ENTMCNC: 31.7 G/DL (ref 31.5–36.5)
MCV RBC AUTO: 93 FL (ref 78–100)
METAMYELOCYTES # BLD MANUAL: 6.8 10E3/UL
METAMYELOCYTES NFR BLD MANUAL: 20 %
MONOCYTES # BLD MANUAL: 1 10E3/UL (ref 0–1.3)
MONOCYTES NFR BLD MANUAL: 3 %
MYELOCYTES # BLD MANUAL: 1.7 10E3/UL
MYELOCYTES NFR BLD MANUAL: 5 %
NEUTROPHILS # BLD MANUAL: 19.7 10E3/UL (ref 1.6–8.3)
NEUTROPHILS NFR BLD MANUAL: 58 %
PLAT MORPH BLD: ABNORMAL
PLATELET # BLD AUTO: 137 10E3/UL (ref 150–450)
POTASSIUM SERPL-SCNC: 4 MMOL/L (ref 3.4–5.3)
PROMYELOCYTES # BLD MANUAL: 0.3 10E3/UL
PROMYELOCYTES NFR BLD MANUAL: 1 %
PROT SERPL-MCNC: 5.4 G/DL (ref 6.4–8.3)
RBC # BLD AUTO: 2.83 10E6/UL (ref 3.8–5.2)
RBC MORPH BLD: ABNORMAL
SODIUM SERPL-SCNC: 142 MMOL/L (ref 136–145)
UFH PPP CHRO-ACNC: 0.59 IU/ML
WBC # BLD AUTO: 33.9 10E3/UL (ref 4–11)

## 2023-02-12 PROCEDURE — 36415 COLL VENOUS BLD VENIPUNCTURE: CPT | Performed by: INTERNAL MEDICINE

## 2023-02-12 PROCEDURE — 99232 SBSQ HOSP IP/OBS MODERATE 35: CPT | Performed by: STUDENT IN AN ORGANIZED HEALTH CARE EDUCATION/TRAINING PROGRAM

## 2023-02-12 PROCEDURE — 250N000013 HC RX MED GY IP 250 OP 250 PS 637: Performed by: HOSPITALIST

## 2023-02-12 PROCEDURE — 250N000013 HC RX MED GY IP 250 OP 250 PS 637: Performed by: INTERNAL MEDICINE

## 2023-02-12 PROCEDURE — 85610 PROTHROMBIN TIME: CPT | Performed by: HOSPITALIST

## 2023-02-12 PROCEDURE — 250N000011 HC RX IP 250 OP 636: Performed by: PHYSICIAN ASSISTANT

## 2023-02-12 PROCEDURE — 85027 COMPLETE CBC AUTOMATED: CPT | Performed by: HOSPITALIST

## 2023-02-12 PROCEDURE — 120N000001 HC R&B MED SURG/OB

## 2023-02-12 PROCEDURE — 85041 AUTOMATED RBC COUNT: CPT | Performed by: HOSPITALIST

## 2023-02-12 PROCEDURE — 85520 HEPARIN ASSAY: CPT | Performed by: HOSPITALIST

## 2023-02-12 PROCEDURE — 80053 COMPREHEN METABOLIC PANEL: CPT | Performed by: INTERNAL MEDICINE

## 2023-02-12 PROCEDURE — 85007 BL SMEAR W/DIFF WBC COUNT: CPT | Performed by: HOSPITALIST

## 2023-02-12 PROCEDURE — 250N000013 HC RX MED GY IP 250 OP 250 PS 637: Performed by: NURSE PRACTITIONER

## 2023-02-12 PROCEDURE — 250N000011 HC RX IP 250 OP 636: Performed by: HOSPITALIST

## 2023-02-12 PROCEDURE — 99232 SBSQ HOSP IP/OBS MODERATE 35: CPT | Performed by: INTERNAL MEDICINE

## 2023-02-12 RX ORDER — WARFARIN SODIUM 7.5 MG/1
7.5 TABLET ORAL
Status: COMPLETED | OUTPATIENT
Start: 2023-02-12 | End: 2023-02-12

## 2023-02-12 RX ADMIN — ATORVASTATIN CALCIUM 40 MG: 40 TABLET, FILM COATED ORAL at 19:47

## 2023-02-12 RX ADMIN — GABAPENTIN 100 MG: 100 CAPSULE ORAL at 13:40

## 2023-02-12 RX ADMIN — OXYCODONE HYDROCHLORIDE 2.5 MG: 5 TABLET ORAL at 19:51

## 2023-02-12 RX ADMIN — MULTIPLE VITAMINS W/ MINERALS TAB 1 TABLET: TAB at 08:25

## 2023-02-12 RX ADMIN — GABAPENTIN 100 MG: 100 CAPSULE ORAL at 08:25

## 2023-02-12 RX ADMIN — NYSTATIN 500000 UNITS: 100000 SUSPENSION ORAL at 17:38

## 2023-02-12 RX ADMIN — ARIPIPRAZOLE 5 MG: 5 TABLET ORAL at 08:25

## 2023-02-12 RX ADMIN — NYSTATIN 500000 UNITS: 100000 SUSPENSION ORAL at 21:13

## 2023-02-12 RX ADMIN — FOLIC ACID 2 MG: 1 TABLET ORAL at 08:25

## 2023-02-12 RX ADMIN — MICONAZOLE NITRATE: 20 CREAM TOPICAL at 04:12

## 2023-02-12 RX ADMIN — NYSTATIN 500000 UNITS: 100000 SUSPENSION ORAL at 13:40

## 2023-02-12 RX ADMIN — DULOXETINE HYDROCHLORIDE 120 MG: 60 CAPSULE, DELAYED RELEASE ORAL at 08:25

## 2023-02-12 RX ADMIN — HYDROMORPHONE HYDROCHLORIDE 0.2 MG: 0.2 INJECTION, SOLUTION INTRAMUSCULAR; INTRAVENOUS; SUBCUTANEOUS at 03:31

## 2023-02-12 RX ADMIN — GABAPENTIN 100 MG: 100 CAPSULE ORAL at 19:47

## 2023-02-12 RX ADMIN — MICONAZOLE NITRATE: 20 CREAM TOPICAL at 15:36

## 2023-02-12 RX ADMIN — WARFARIN SODIUM 7.5 MG: 7.5 TABLET ORAL at 17:38

## 2023-02-12 RX ADMIN — NYSTATIN 500000 UNITS: 100000 SUSPENSION ORAL at 08:25

## 2023-02-12 RX ADMIN — PANTOPRAZOLE SODIUM 40 MG: 40 TABLET, DELAYED RELEASE ORAL at 16:10

## 2023-02-12 RX ADMIN — HEPARIN SODIUM 1800 UNITS/HR: 10000 INJECTION, SOLUTION INTRAVENOUS at 13:35

## 2023-02-12 RX ADMIN — HYDROMORPHONE HYDROCHLORIDE 0.2 MG: 0.2 INJECTION, SOLUTION INTRAMUSCULAR; INTRAVENOUS; SUBCUTANEOUS at 16:14

## 2023-02-12 RX ADMIN — PANTOPRAZOLE SODIUM 40 MG: 40 TABLET, DELAYED RELEASE ORAL at 06:45

## 2023-02-12 ASSESSMENT — ACTIVITIES OF DAILY LIVING (ADL)
ADLS_ACUITY_SCORE: 62
ADLS_ACUITY_SCORE: 58
ADLS_ACUITY_SCORE: 56
ADLS_ACUITY_SCORE: 58
ADLS_ACUITY_SCORE: 62
ADLS_ACUITY_SCORE: 62
ADLS_ACUITY_SCORE: 56
ADLS_ACUITY_SCORE: 62
ADLS_ACUITY_SCORE: 56
ADLS_ACUITY_SCORE: 62

## 2023-02-12 NOTE — PROGRESS NOTES
Renal Medicine Progress Note            Assessment/Plan:     Assessment: Shira Rodriguez is a 63 yo female with PMH RA on methotrexate, morbid obesity, h/o PEA arrest due to PE admitted after being found down, found to have severe pancytopenia, intertrigo, severe sepsis, and KRISS.     KRISS, improving  Cr normal as of 2021, notably 1.7 in May 2022 and recently 2.1. KRISS earlier in admission thought to be prerenal KRISS with probable progression to ATN. Cr had improved to a presley of  1.8. Significant Cr jump starting 2/7. No IV contrast, no hypotensive events. UOP has remained good. Only new med was lovenox 40mg daily started 2/6. Cr seems to be overestimating her renal function, Cr of 2.5 (eGFR 21) correlated with Cystatin C 3.4 (eGFR 15). Possible that meds need further dose reduction given Cr overestimation.  Lovenox discontinued.  Creatinine peaked at 2.5, plateaued there and now begins to decrease.  -If creatinine continues to improve tomorrow, okay to discharge from nephrology perspective  -Nephrology follow-up at [facility] in 4 to 6 weeks after discharge ([phone])  - renally dose meds   - I/Os  - daily BMP     Pancytopenia, improved  Hematology consulted, has undergone bone marrow bx without clear etiology besides methotrexate BM suppression. Is getting neupogen.     Neutropenic fever, resolved  ID following, completing course of cefepime.    Intertrigo   Completed fluconazole course.      Metabolic acidosis, resolved   Lactic acidosis, resolved, resolved    Mild hypokalemia  Replace prn.        Interval History:     - No acute events overnight  -Patient feels well, is deconditioned and slightly tired but otherwise okay  -Great urine output  -Good appetite  -No shortness of breath or edema  -Hoping to discharge soon            Medications and Allergies:       ARIPiprazole  5 mg Oral Daily     atorvastatin  40 mg Oral QPM     [START ON 2/14/2023] cyanocobalamin  1,000 mcg Sublingual Daily      "DULoxetine  120 mg Oral Daily     folic acid  2 mg Oral Daily     gabapentin  100 mg Oral TID     miconazole   Topical BID     multivitamin w/minerals  1 tablet Oral Daily     nystatin  500,000 Units Swish & Spit 4x Daily     pantoprazole  40 mg Oral BID AC     sodium chloride (PF)  3 mL Intracatheter Q8H     warfarin ANTICOAGULANT  7.5 mg Oral ONCE at 18:00     Warfarin Therapy Reminder  1 each Oral See Admin Instructions        Allergies   Allergen Reactions     Adhesive Tape Blisters     Erythromycin Rash            Physical Exam:   Vitals were reviewed  BP (!) 144/65 (BP Location: Left arm)   Pulse 78   Temp 97.9  F (36.6  C) (Oral)   Resp 16   Ht 1.676 m (5' 6\")   Wt 136.5 kg (301 lb)   SpO2 95%   BMI 48.58 kg/m      Wt Readings from Last 3 Encounters:   02/08/23 136.5 kg (301 lb)   01/17/23 129.3 kg (285 lb)   12/06/22 129.3 kg (285 lb)       Intake/Output Summary (Last 24 hours) at 2/8/2023 1153  Last data filed at 2/8/2023 1113  Gross per 24 hour   Intake --   Output 1775 ml   Net -1775 ml       GENERAL APPEARANCE: no acute distress, resting comfortably in bed  HEENT: MMM  RESP: clear  CV: RRR  EXTREMITIES/SKIN: no LE edema, multiple bruises, intertrigo in multiple skin folds  NEURO:  Alert, oriented, normal speech           Data:     BMP  Recent Labs   Lab 02/12/23  0731 02/11/23  1449 02/11/23  0749 02/10/23  0730 02/09/23  1500 02/09/23  0758     --  142 141  --  136   POTASSIUM 4.0 3.5 3.3* 3.5   < > 3.3*   CHLORIDE 110*  --  108* 108*  --  103   MARIA INES 8.2*  --  8.1* 8.1*  --  7.9*   CO2 25  --  25 25  --  25   BUN 33.7*  --  33.4* 36.5*  --  38.6*   CR 2.33*  --  2.55* 2.57*  --  2.57*   *  --  94 95  --  92    < > = values in this interval not displayed.     CBC  Recent Labs   Lab 02/12/23  0731 02/11/23  0749 02/10/23  0730 02/09/23  1345   WBC 33.9* 39.0* 34.2* 28.0*   HGB 8.3* 8.3* 7.7* 8.2*   HCT 26.2* 26.2* 24.2* 26.0*   MCV 93 91 91 90   * 142* 124* 93*     Lab Results "   Component Value Date    AST 43 (H) 02/12/2023    ALT 20 02/12/2023    ALKPHOS 83 02/12/2023    BILITOTAL <0.2 02/12/2023    BILICONJ 0.0 12/30/2008     Lab Results   Component Value Date    INR 1.33 (H) 02/12/2023       Attestation:  I have reviewed today's vital signs, notes, medications, labs and imaging.    Juan Lott MD  [facility] - Nephrology  Office: [phone]

## 2023-02-12 NOTE — PLAN OF CARE
A&Ox4, makes needs known. C/o abdominal fold pain, PRN Oxycodone given upon request with good relief. IV infusing Heparin 1800 units/hr per order, Anti Xa lab re-draw in AM. Purewick in place, good output. Repositioning patient when allows. Regular diet, thin liquids, no straws. Wound care done per care plan this AM at 0400, pre-medicated patient, it was very helpful. Care rounding in place.     Goal Outcome Evaluation:    Problem: Plan of Care - These are the overarching goals to be used throughout the patient stay.    Goal: Absence of Hospital-Acquired Illness or Injury  Intervention: Identify and Manage Fall Risk  Recent Flowsheet Documentation  Taken 2/11/2023 2045 by Emperatriz Boswell RN  Safety Promotion/Fall Prevention:    activity supervised    bed alarm on    patient and family education    safety round/check completed  Intervention: Prevent Skin Injury  Recent Flowsheet Documentation  Taken 2/11/2023 2300 by Emperatriz oBswell RN  Body Position:    weight shifting    supine    heels elevated  Taken 2/11/2023 2100 by Emperatriz Boswell RN  Body Position:    position maintained    refuses positioning  Intervention: Prevent Infection  Recent Flowsheet Documentation  Taken 2/11/2023 2045 by Emperatriz Boswell RN  Infection Prevention: rest/sleep promoted  Goal: Optimal Comfort and Wellbeing  Intervention: Monitor Pain and Promote Comfort  Recent Flowsheet Documentation  Taken 2/11/2023 1930 by Emperatriz Boswell, RN  Pain Management Interventions: medication (see MAR)

## 2023-02-12 NOTE — PROGRESS NOTES
[facility]    Medicine Progress Note - Hospitalist Service    Date of Admission:  1/28/2023    Assessment & Plan   Shira Rodriguez is a 62-year-old female with rheumatoid arthritis on methotrexate, prior massive PE on warfarin, morbid obesity who presented on 1/28/2023 with severe sepsis.  She was found unresponsive in her home. She  Was hypotensive and had temp of 100.2 in route to hospital.      Severe sepsis-present on admission  Candida intertrigo  Neutropenic fever, resolved, now with leukocytosis likely secondary to Neupogen  Lactic acidosis, Resolved   * Presented with lactic acidosis of 3.2, hypotension, HR of 105, WBC of 0.4, acute metabolic encephalopathy - met criteria for severe sepsis on admission; resolved with IVF and empiric antibiotics  * likely potential sources include panniculitis (significant redness under abd pannus and breasts) or mucositis (severe throat pain x2 days) and duodenitis with severe neutropenia.  * CT C/A/P showed small chronic left pleural effusion, small fluid around distal esophagus and duodenum  * TTE 1/30 with possible vegetation; LETA 1/31 negative for vegetation or dissection  * repeat CT chest 2/4 (due to cough) with slight increase in bilateral pleural effusions, mild increase in atelectasis   * ID consulted with extensive infectious work-up: positive fungitell (significance unclear), positive EBV IgG (but negative IgM and DNA)  * negative infectious work-up:  UA and culture, blood cultures, COVID/RSV/flu, Hep B&C, HIV, HSV, CMV, aspergillus, Blastomyces, histoplasmosis, anaplasma, Rickettsia, Ehrlichia   * completed five day course doxycycline 2/3 (empiric tx for tick-borne illness)  * completed 7-day course fluconazole 2/6 (for Candida intertrigo)  * cefepime discontinued 2/8 following recovery of neutrophil count per ID     - unclear significance of Beta D glucan at this time, no obvious systemic fungal infection     - blood cultures 2/7  ngtd  - remains afebrile; monitor off antibiotics  - current leukocytosis due to Neuopgen    Pancytopenia with severe leukopenia and neutropenia, moderate anemia and thrombocytopenia  Severe vitamin B12 deficiency and folate deficiency  * CBC wnl 12/7/2022; On admission 1/28 - Hb 9.4, WBC 0.4, ANC 0.1, platelets 59  * Iron studies 1/29 indicating anemia of chronic disease.  Reticulocyte count suppressed.  B12 undetectable and folate low at 2.8  * initiated on B12 and folate, ultimately started on leucovorin rescue 2/2-2/5 per Heme  * methotrexate level 1/30 undetectable   * Peripheral smear 1/29 showed moderate to marked pancytopenia. Negative for schistocytes and elevated fibrinogen argues against DIC. Negative for circulating blasts on scanning.   * flow cytometry shows polytypic B cells, no aberrant immunophenotype on T cells and rare to absent CD34 positive blasts  * elevated Kappa and lambda free light chains, monoclonal IgM immunoglobulin of lambda light chain type, IgM is elevated at 395 and IgG and IgA are within normal limits   * immunological work-up showed C3 and C4 wnl, ANCA and ds-DNA negative  *  s/p bone marrow biopsy on 2/3/2023 with no clear etiology (see report for details) but no current evidence for neoplasm or aplastic anemia - Heme suspects hypocellularity primarily due to methotrexate, B12 and folate deficiency  * received 1U PRBC 1/31 and 2/4  * received daily platelet transfusions 1/30-2/5 for plt <50K  * repeat B12 level 2/8 >4K; will need ongoing outpatient monitoring and monthly B12 injections  * received daily Neupogen 1/30-2/7; stopped with recovery of cell counts     - per Heme; transfuse for plt <20K or signs of bleeding; hgb <7 g/dl  - continue oral folate; outpatient plan for discharging with B12 supplement  - counts recovering  - bone marrow cytogenetic studies pending        KRISS on CKD stage 4, resolved  Mild anion gap metabolic acidosis, resolved   * Normal renal function in  2021, Cr 1.7 5/2022, more recent baseline creatinine 2-2.2.   Creatinine on admission 3.25.    * Nephrology consulted, suspect pre-renal with possible progression to ATN, treated with IVF with gradual improvement.  Neph signed off 2/3.   * Neph re-consulted 2/8 due to worsening renal function of unclear etiology, no improvement with fluid bolus     - Cr has remained remained stable/improving and is 2.33 this morning, discussed with nephrology, looks like renal function is improving now  -Urine eosinophil negative  - avoid nephrotoxins  -Monitor labs     Probable esophagitis  Probable duodenitis  Dysphagia   Mucositis   * she has been complaining of throat pain; oral ulcers presents on exam   * CT on admission showed small amount of fluid adjacent to distal thoracic esophagus and scattered posterior mediastinal lymph nodes along esophagus of uncertain etiology but could be secondary to esophagitis or other inflammatory process.  CT scan also showed small edema around second and third portion of duodenum.  - SLP following, has advanced to reg diet   - continue Protonix  - odynophagia has resolved and mouth pain improving     Panniculitis, improving  Candidal rash-under pannus, breasts, improving  * treated with fluconazole 1/30-2/6 per ID  - Wound care on board   - continue miconazole cream     Hypokalemia  - continue replacement protocol     Type II MI due to severe sepsis  * High-sensitivity troponin mildly elevated at 48 and repeat was normal and can be due to demand  * TTE 1/30 with normal LV function without WMA, RV with mildly decreased systolic function     History of massive PE causing PEA arrest, 9/2019  * last dose of warfarin was 1/29, but INR has remained therapeutic through 2/5.   - as above, significant discrepancy between GFR calculation with cystatin c vs Cr; and so was started on heparin gtt bridge to coumadin, INR today 1.33  -Pharmacy managing Coumadin dose     Rheumatoid arthritis  * Historically  "on methotrexate, resumed about 2 weeks prior to admission due to worsening back and shoulder pain.   *Previous provider had sent inbox message to outpatient rheumatologist, Dr. Austin at [facility], on 2/8 and he plans for clinic to contact her to schedule f/u appt within 1 month  -Hold methotrexate, follow up with outpatient rheumatologist      COPD  chronic cough  * has chronic cough for years and follows with pulmonary at [facility]  * CXR 2/3 showed mild opacity at the left lung base is new since the previous exam - atelectasis vs pneumonia.   * CT chest 2/4 with findings as above   - no signs of acute exacerbation     Chronic major depression  Chronic pain  * see recent pain clinic visit summary from 1/17/2023 for background information  - Continue Abilify, Cymbalta and Neurontin     Essential hypertension  PTA lisinopril initially held due to KRISS and hypotension, which have resolved.  - remains normotensive at this time, resume as indicated     Morbid obesity, BMI 46  Estimated body mass index is 45.9 kg/m  as calculated from the following:    Height as of this encounter: 1.676 m (5' 6\").    Weight as of this encounter: 129 kg (284 lb 6.3 oz).     Acute metabolic encephalopathy secondary to severe sepsis, resolved  Admission head CT negative for acute pathology.  Returned to baseline mental status with treatment of sepsis.      Mild hypovolemic hyponatremia, resolved  Admission sodium 132, normalized with IVF.      Hypomagnesemia, resolved  * replaced per protocol     3 cm right adrenal nodule  * Noted on CT.  Was present on previous imaging as well.  Likely benign  * A.m. cortisol 1/29, normal at 43.9     Hypoalbuminemia, albumin 2.5     Hyperuricemia  * uric acid 8.9 on 1/29/23          Diet: Snacks/Supplements Adult: Other; L: gel20, D: magic cup; With Meals  Combination Diet Regular Diet; Thin Liquids (level 0) (no straws)    DVT Prophylaxis: Warfarin with heparin increase  [place]wood Catheter: Not present  Lines: None   " "  Cardiac Monitoring: None  Code Status: Full Code      Clinically Significant Risk Factors        # Hypokalemia: Lowest K = 3.3 mmol/L in last 2 days, will replace as needed       # Hypoalbuminemia: Lowest albumin = 2.3 g/dL at 2/12/2023  7:31 AM, will monitor as appropriate           # Severe Obesity: Estimated body mass index is 48.58 kg/m  as calculated from the following:    Height as of this encounter: 1.676 m (5' 6\").    Weight as of this encounter: 136.5 kg (301 lb).          Disposition Plan      Expected Discharge Date: 02/13/2023                  Kourtney Lugo MD  Hospitalist Service  [facility]  Securely message with tydy (more info)  Text page via [employer] Paging/Directory   ______________________________________________________________________    Interval History   Patient with no new complaints.  Continues to feel gradual improvement.  No new nursing concerns.    Physical Exam   Vital Signs: Temp: 97.9  F (36.6  C) Temp src: Oral BP: (!) 144/65 Pulse: 78   Resp: 16 SpO2: 95 % O2 Device: None (Room air)    Weight: 301 lbs 0 oz    Exam:  Constitutional: Awake, alert and no distress. Appears comfortable  Head: Normocephalic. No masses, lesions, tenderness or abnormalities  ENT: ENT exam normal, no neck nodes or sinus tenderness  Cardiovascular: RRR.  No murmurs, no rubs or JVD  Respiratory: Normal WOB,b/l equal air entry, no wheezes or crackles   Gastrointestinal: Abdomen soft, non-tender. BS normal. No masses, organomegaly  : Deferred  Extremities : No edema , no clubbing or cyanosis    Neurologic: Cranial nerves II-XII grossly intact , power symmetrical, Reflexes normal and symmetric. Sensation grossly WNL.      Medical Decision Making       38 MINUTES SPENT BY ME on the date of service doing chart review, history, exam, documentation & further activities per the note.      Data     I have personally reviewed the following data over the past 24 hrs:    33.9 (H)  \   8.3 (L)   " / 137 (L)     142 110 (H) 33.7 (H) /  101 (H)   4.0 25 2.33 (H) \       ALT: 20 AST: 43 (H) AP: 83 TBILI: <0.2   ALB: 2.3 (L) TOT PROTEIN: 5.4 (L) LIPASE: N/A       INR:  1.33 (H) PTT:  N/A   D-dimer:  N/A Fibrinogen:  N/A       Imaging results reviewed over the past 24 hrs:   No results found for this or any previous visit (from the past 24 hour(s)).  Recent Labs   Lab 02/12/23  0731 02/11/23  1449 02/11/23  0749 02/10/23  0730   WBC 33.9*  --  39.0* 34.2*   HGB 8.3*  --  8.3* 7.7*   MCV 93  --  91 91   *  --  142* 124*   INR 1.33*  --  1.30* 1.38*     --  142 141   POTASSIUM 4.0 3.5 3.3* 3.5   CHLORIDE 110*  --  108* 108*   CO2 25  --  25 25   BUN 33.7*  --  33.4* 36.5*   CR 2.33*  --  2.55* 2.57*   ANIONGAP 7  --  9 8   MARIA INES 8.2*  --  8.1* 8.1*   *  --  94 95   ALBUMIN 2.3*  --  2.3*  --    PROTTOTAL 5.4*  --  5.4*  --    BILITOTAL <0.2  --  <0.2  --    ALKPHOS 83  --  88  --    ALT 20  --  18  --    AST 43*  --  43*  --

## 2023-02-13 ENCOUNTER — APPOINTMENT (OUTPATIENT)
Dept: PHYSICAL THERAPY | Facility: CLINIC | Age: 63
DRG: 871 | End: 2023-02-13
Payer: COMMERCIAL

## 2023-02-13 LAB
ALBUMIN SERPL BCG-MCNC: 2.3 G/DL (ref 3.5–5.2)
ALP SERPL-CCNC: 82 U/L (ref 35–104)
ALT SERPL W P-5'-P-CCNC: 21 U/L (ref 10–35)
ANION GAP SERPL CALCULATED.3IONS-SCNC: 9 MMOL/L (ref 7–15)
AST SERPL W P-5'-P-CCNC: 41 U/L (ref 10–35)
BASOPHILS # BLD MANUAL: 0 10E3/UL (ref 0–0.2)
BASOPHILS NFR BLD MANUAL: 0 %
BILIRUB SERPL-MCNC: 0.2 MG/DL
BUN SERPL-MCNC: 29.1 MG/DL (ref 8–23)
CALCIUM SERPL-MCNC: 8.3 MG/DL (ref 8.8–10.2)
CHLORIDE SERPL-SCNC: 110 MMOL/L (ref 98–107)
CREAT SERPL-MCNC: 2.26 MG/DL (ref 0.51–0.95)
DEPRECATED HCO3 PLAS-SCNC: 26 MMOL/L (ref 22–29)
EOSINOPHIL # BLD MANUAL: 0 10E3/UL (ref 0–0.7)
EOSINOPHIL NFR BLD MANUAL: 0 %
ERYTHROCYTE [DISTWIDTH] IN BLOOD BY AUTOMATED COUNT: 14 % (ref 10–15)
GFR SERPL CREATININE-BSD FRML MDRD: 24 ML/MIN/1.73M2
GLUCOSE SERPL-MCNC: 88 MG/DL (ref 70–99)
HCT VFR BLD AUTO: 25.7 % (ref 35–47)
HGB BLD-MCNC: 8 G/DL (ref 11.7–15.7)
INR PPP: 1.42 (ref 0.85–1.15)
LYMPHOCYTES # BLD MANUAL: 4.3 10E3/UL (ref 0.8–5.3)
LYMPHOCYTES NFR BLD MANUAL: 12 %
MCH RBC QN AUTO: 28.6 PG (ref 26.5–33)
MCHC RBC AUTO-ENTMCNC: 31.1 G/DL (ref 31.5–36.5)
MCV RBC AUTO: 92 FL (ref 78–100)
METAMYELOCYTES # BLD MANUAL: 4.6 10E3/UL
METAMYELOCYTES NFR BLD MANUAL: 13 %
MONOCYTES # BLD MANUAL: 2.1 10E3/UL (ref 0–1.3)
MONOCYTES NFR BLD MANUAL: 6 %
MYELOCYTES # BLD MANUAL: 2.1 10E3/UL
MYELOCYTES NFR BLD MANUAL: 6 %
NEUTROPHILS # BLD MANUAL: 22.4 10E3/UL (ref 1.6–8.3)
NEUTROPHILS NFR BLD MANUAL: 63 %
PLAT MORPH BLD: ABNORMAL
PLATELET # BLD AUTO: 132 10E3/UL (ref 150–450)
POTASSIUM SERPL-SCNC: 4 MMOL/L (ref 3.4–5.3)
PROT SERPL-MCNC: 5.6 G/DL (ref 6.4–8.3)
RBC # BLD AUTO: 2.8 10E6/UL (ref 3.8–5.2)
RBC MORPH BLD: ABNORMAL
SODIUM SERPL-SCNC: 145 MMOL/L (ref 136–145)
UFH PPP CHRO-ACNC: 0.64 IU/ML
WBC # BLD AUTO: 35.5 10E3/UL (ref 4–11)

## 2023-02-13 PROCEDURE — 99232 SBSQ HOSP IP/OBS MODERATE 35: CPT | Performed by: INTERNAL MEDICINE

## 2023-02-13 PROCEDURE — 36415 COLL VENOUS BLD VENIPUNCTURE: CPT | Performed by: INTERNAL MEDICINE

## 2023-02-13 PROCEDURE — 85520 HEPARIN ASSAY: CPT | Performed by: INTERNAL MEDICINE

## 2023-02-13 PROCEDURE — 85610 PROTHROMBIN TIME: CPT | Performed by: HOSPITALIST

## 2023-02-13 PROCEDURE — 85007 BL SMEAR W/DIFF WBC COUNT: CPT | Performed by: HOSPITALIST

## 2023-02-13 PROCEDURE — 84155 ASSAY OF PROTEIN SERUM: CPT | Performed by: INTERNAL MEDICINE

## 2023-02-13 PROCEDURE — 250N000013 HC RX MED GY IP 250 OP 250 PS 637: Performed by: INTERNAL MEDICINE

## 2023-02-13 PROCEDURE — 250N000011 HC RX IP 250 OP 636: Performed by: HOSPITALIST

## 2023-02-13 PROCEDURE — 250N000013 HC RX MED GY IP 250 OP 250 PS 637: Performed by: HOSPITALIST

## 2023-02-13 PROCEDURE — 85027 COMPLETE CBC AUTOMATED: CPT | Performed by: HOSPITALIST

## 2023-02-13 PROCEDURE — 250N000011 HC RX IP 250 OP 636: Performed by: PHYSICIAN ASSISTANT

## 2023-02-13 PROCEDURE — 82435 ASSAY OF BLOOD CHLORIDE: CPT | Performed by: INTERNAL MEDICINE

## 2023-02-13 PROCEDURE — 120N000001 HC R&B MED SURG/OB

## 2023-02-13 PROCEDURE — 82374 ASSAY BLOOD CARBON DIOXIDE: CPT | Performed by: INTERNAL MEDICINE

## 2023-02-13 PROCEDURE — 97110 THERAPEUTIC EXERCISES: CPT | Mod: GP

## 2023-02-13 PROCEDURE — 250N000013 HC RX MED GY IP 250 OP 250 PS 637: Performed by: NURSE PRACTITIONER

## 2023-02-13 RX ADMIN — GABAPENTIN 100 MG: 100 CAPSULE ORAL at 08:58

## 2023-02-13 RX ADMIN — DULOXETINE HYDROCHLORIDE 120 MG: 60 CAPSULE, DELAYED RELEASE ORAL at 08:58

## 2023-02-13 RX ADMIN — FOLIC ACID 2 MG: 1 TABLET ORAL at 08:58

## 2023-02-13 RX ADMIN — NYSTATIN 500000 UNITS: 100000 SUSPENSION ORAL at 08:59

## 2023-02-13 RX ADMIN — NYSTATIN 500000 UNITS: 100000 SUSPENSION ORAL at 13:24

## 2023-02-13 RX ADMIN — PANTOPRAZOLE SODIUM 40 MG: 40 TABLET, DELAYED RELEASE ORAL at 06:32

## 2023-02-13 RX ADMIN — MULTIPLE VITAMINS W/ MINERALS TAB 1 TABLET: TAB at 08:58

## 2023-02-13 RX ADMIN — HYDROMORPHONE HYDROCHLORIDE 0.2 MG: 0.2 INJECTION, SOLUTION INTRAMUSCULAR; INTRAVENOUS; SUBCUTANEOUS at 23:57

## 2023-02-13 RX ADMIN — MICONAZOLE NITRATE: 20 CREAM TOPICAL at 10:59

## 2023-02-13 RX ADMIN — HEPARIN SODIUM 1800 UNITS/HR: 10000 INJECTION, SOLUTION INTRAVENOUS at 02:11

## 2023-02-13 RX ADMIN — HYDROMORPHONE HYDROCHLORIDE 0.2 MG: 0.2 INJECTION, SOLUTION INTRAMUSCULAR; INTRAVENOUS; SUBCUTANEOUS at 10:28

## 2023-02-13 RX ADMIN — PANTOPRAZOLE SODIUM 40 MG: 40 TABLET, DELAYED RELEASE ORAL at 15:55

## 2023-02-13 RX ADMIN — WARFARIN SODIUM 7.5 MG: 5 TABLET ORAL at 17:51

## 2023-02-13 RX ADMIN — NYSTATIN 500000 UNITS: 100000 SUSPENSION ORAL at 21:40

## 2023-02-13 RX ADMIN — GABAPENTIN 100 MG: 100 CAPSULE ORAL at 20:21

## 2023-02-13 RX ADMIN — GABAPENTIN 100 MG: 100 CAPSULE ORAL at 13:24

## 2023-02-13 RX ADMIN — ATORVASTATIN CALCIUM 40 MG: 40 TABLET, FILM COATED ORAL at 20:22

## 2023-02-13 RX ADMIN — MICONAZOLE NITRATE: 20 CREAM TOPICAL at 04:07

## 2023-02-13 RX ADMIN — ARIPIPRAZOLE 5 MG: 5 TABLET ORAL at 08:58

## 2023-02-13 RX ADMIN — NYSTATIN 500000 UNITS: 100000 SUSPENSION ORAL at 17:51

## 2023-02-13 RX ADMIN — HEPARIN SODIUM 1800 UNITS/HR: 10000 INJECTION, SOLUTION INTRAVENOUS at 16:40

## 2023-02-13 RX ADMIN — HYDROMORPHONE HYDROCHLORIDE 0.2 MG: 0.2 INJECTION, SOLUTION INTRAMUSCULAR; INTRAVENOUS; SUBCUTANEOUS at 03:19

## 2023-02-13 ASSESSMENT — ACTIVITIES OF DAILY LIVING (ADL)
ADLS_ACUITY_SCORE: 58

## 2023-02-13 NOTE — PLAN OF CARE
"A&Ox4, makes needs known. C/o pain in ABD folds, PRN Oxycodone given with good relief. Takes pills whole with water. Heparin gtt. Infusing 1800 units/hr, Anti Xa draw in AM order in. Repositioned W7lrote. Had BMx2 this shift. Purewick in place.  BP (!) 151/64 (BP Location: Left arm)   Pulse 59   Temp 98.1  F (36.7  C) (Oral)   Resp 18   Ht 1.676 m (5' 6\")   Wt 136.5 kg (301 lb)   SpO2 96%   BMI 48.58 kg/m  on RA. Pre-medicated before wound cares done, patient states that this is very helpful.       Goal Outcome Evaluation:    Problem: Plan of Care - These are the overarching goals to be used throughout the patient stay.    Goal: Absence of Hospital-Acquired Illness or Injury  Intervention: Identify and Manage Fall Risk  Recent Flowsheet Documentation  Taken 2/12/2023 2000 by Emperatriz Boswell, RN  Safety Promotion/Fall Prevention:    activity supervised    bed alarm on    patient and family education    safety round/check completed  Intervention: Prevent Infection  Recent Flowsheet Documentation  Taken 2/12/2023 2000 by Emperatriz Boswell, RN  Infection Prevention: rest/sleep promoted  Goal: Optimal Comfort and Wellbeing  Intervention: Monitor Pain and Promote Comfort  Recent Flowsheet Documentation  Taken 2/12/2023 1952 by Emperatriz Boswell, RN  Pain Management Interventions: medication (see MAR)     "

## 2023-02-13 NOTE — PROGRESS NOTES
Care Management Follow Up    Length of Stay (days): 15    Expected Discharge Date: 02/15/2023     Concerns to be Addressed:       Patient plan of care discussed at interdisciplinary rounds: Yes    Anticipated Discharge Disposition: Transitional Care     Anticipated Discharge Services: None  Anticipated Discharge DME: None    Patient/family educated on Medicare website which has current facility and service quality ratings: yes  Education Provided on the Discharge Plan:    Patient/Family in Agreement with the Plan: yes    Referrals Placed by CM/SW:    Private pay costs discussed: Not applicable    Additional Information:  Received call from Hannah CORRAL Case Manager from [facility] asking for update.  Hannah was informed that pt is not medically ready to discharge and that SW is looking for TCU bed.  Hannah requested a call to [phone] to be informed of TCU pt is discharging to.      Addendum:  Per consult for scheduling follow-up with Nephrologist at [facility] in 6 weeks, called clinic and scheduled pt to see Dr. Hudson on March 27th at 1:00 PM.  Appt date & time placed on AVS.    Inpatient Care Coordination will continue to follow for discharge planning.  ELLIS Balderas RN, BSN, OCN   Inpatient Care Coordination [address]  Office: [phone]

## 2023-02-13 NOTE — PROGRESS NOTES
[facility]    Medicine Progress Note - Hospitalist Service    Date of Admission:  1/28/2023    Assessment & Plan   Shira Rodriguez is a 62-year-old female with rheumatoid arthritis on methotrexate, prior massive PE on warfarin, morbid obesity who presented on 1/28/2023 with severe sepsis.  She was found unresponsive in her home. She  Was hypotensive and had temp of 100.2 in route to hospital.      Severe sepsis-present on admission  Candida intertrigo  Neutropenic fever, resolved, now with leukocytosis likely secondary to Neupogen  Lactic acidosis, Resolved   * Presented with lactic acidosis of 3.2, hypotension, HR of 105, WBC of 0.4, acute metabolic encephalopathy - met criteria for severe sepsis on admission; resolved with IVF and empiric antibiotics  * likely potential sources include panniculitis (significant redness under abd pannus and breasts) or mucositis (severe throat pain x2 days) and duodenitis with severe neutropenia.  * CT C/A/P showed small chronic left pleural effusion, small fluid around distal esophagus and duodenum  * TTE 1/30 with possible vegetation; LETA 1/31 negative for vegetation or dissection  * repeat CT chest 2/4 (due to cough) with slight increase in bilateral pleural effusions, mild increase in atelectasis   * ID consulted with extensive infectious work-up: positive fungitell (significance unclear), positive EBV IgG (but negative IgM and DNA)  * negative infectious work-up:  UA and culture, blood cultures, COVID/RSV/flu, Hep B&C, HIV, HSV, CMV, aspergillus, Blastomyces, histoplasmosis, anaplasma, Rickettsia, Ehrlichia   * completed five day course doxycycline 2/3 (empiric tx for tick-borne illness)  * completed 7-day course fluconazole 2/6 (for Candida intertrigo)  * cefepime discontinued 2/8 following recovery of neutrophil count per ID     - unclear significance of Beta D glucan at this time, no obvious systemic fungal infection     - blood cultures 2/7  ngtd  - remains afebrile; monitor off antibiotics  - current leukocytosis due to Neuopgen which patient had received until 2/7    Pancytopenia with severe leukopenia and neutropenia, moderate anemia and thrombocytopenia  Severe vitamin B12 deficiency and folate deficiency  * CBC wnl 12/7/2022; On admission 1/28 - Hb 9.4, WBC 0.4, ANC 0.1, platelets 59  * Iron studies 1/29 indicating anemia of chronic disease.  Reticulocyte count suppressed.  B12 undetectable and folate low at 2.8  * initiated on B12 and folate, ultimately started on leucovorin rescue 2/2-2/5 per Heme  * methotrexate level 1/30 undetectable   * Peripheral smear 1/29 showed moderate to marked pancytopenia. Negative for schistocytes and elevated fibrinogen argues against DIC. Negative for circulating blasts on scanning.   * flow cytometry shows polytypic B cells, no aberrant immunophenotype on T cells and rare to absent CD34 positive blasts  * elevated Kappa and lambda free light chains, monoclonal IgM immunoglobulin of lambda light chain type, IgM is elevated at 395 and IgG and IgA are within normal limits   * immunological work-up showed C3 and C4 wnl, ANCA and ds-DNA negative  *  s/p bone marrow biopsy on 2/3/2023 with no clear etiology (see report for details) but no current evidence for neoplasm or aplastic anemia - Heme suspects hypocellularity primarily due to methotrexate, B12 and folate deficiency  * received 1U PRBC 1/31 and 2/4  * received daily platelet transfusions 1/30-2/5 for plt <50K  * repeat B12 level 2/8 >4K; will need ongoing outpatient monitoring and monthly B12 injections  * received daily Neupogen 1/30-2/7; stopped with recovery of cell counts     - per Heme; transfuse for plt <20K or signs of bleeding; hgb <7 g/dl  - continue oral folate; outpatient plan for discharging with B12 supplement  - counts recovering  - bone marrow cytogenetic studies pending        KRISS on CKD stage 4, resolved  Mild anion gap metabolic acidosis,  resolved   * Normal renal function in 2021, Cr 1.7 5/2022, more recent baseline creatinine 2-2.2.   Creatinine on admission 3.25.    * Nephrology consulted, suspect pre-renal with possible progression to ATN, treated with IVF with gradual improvement.  Neph signed off 2/3.   * Neph re-consulted 2/8 due to worsening renal function of unclear etiology, no improvement with fluid bolus  -Urine eosinophil negative     - Cr has started improving and is 2.26 this morning  - avoid nephrotoxins  -Monitor labs     Probable esophagitis  Probable duodenitis  Dysphagia   Mucositis   * she has been complaining of throat pain; oral ulcers presents on exam   * CT on admission showed small amount of fluid adjacent to distal thoracic esophagus and scattered posterior mediastinal lymph nodes along esophagus of uncertain etiology but could be secondary to esophagitis or other inflammatory process.  CT scan also showed small edema around second and third portion of duodenum.  - SLP following, has advanced to reg diet   - continue Protonix  - odynophagia has resolved and mouth pain improving     Panniculitis, improving  Candidal rash-under pannus, breasts, improving  * treated with fluconazole 1/30-2/6 per ID  - Wound care on board , patient getting wound care and received  - continue miconazole cream     Hypokalemia  -Being replaced     Type II MI due to severe sepsis  * High-sensitivity troponin mildly elevated at 48 and repeat was normal and can be due to demand  * TTE 1/30 with normal LV function without WMA, RV with mildly decreased systolic function     History of massive PE causing PEA arrest, 9/2019  * last dose of warfarin was 1/29, but INR has remained therapeutic through 2/5.   - as above, significant discrepancy between GFR calculation with cystatin c vs Cr; and so was started on heparin gtt bridge to coumadin, INR today 1.42  -Pharmacy managing Coumadin dose, continue heparin until therapeutic INR     Rheumatoid arthritis  *  "Historically on methotrexate, resumed about 2 weeks prior to admission due to worsening back and shoulder pain.   *Previous provider had sent inbox message to outpatient rheumatologist, Dr. Austin at [facility], on 2/8 and he plans for clinic to contact her to schedule f/u appt within 1 month  -Hold methotrexate, follow up with outpatient rheumatologist      COPD  chronic cough  * has chronic cough for years and follows with pulmonary at [facility]  * CXR 2/3 showed mild opacity at the left lung base is new since the previous exam - atelectasis vs pneumonia.   * CT chest 2/4 with findings as above   - no signs of acute exacerbation     Chronic major depression  Chronic pain  * see recent pain clinic visit summary from 1/17/2023 for background information  - Continue Abilify, Cymbalta and Neurontin     Essential hypertension  PTA lisinopril initially held due to KRISS and hypotension, which have resolved.  - remains normotensive at this time, resume as indicated     Morbid obesity, BMI 46  Estimated body mass index is 45.9 kg/m  as calculated from the following:    Height as of this encounter: 1.676 m (5' 6\").    Weight as of this encounter: 129 kg (284 lb 6.3 oz).     Acute metabolic encephalopathy secondary to severe sepsis, resolved  Admission head CT negative for acute pathology.  Returned to baseline mental status with treatment of sepsis.      Mild hypovolemic hyponatremia, resolved  Admission sodium 132, normalized with IVF.      Hypomagnesemia, resolved  * replaced per protocol     3 cm right adrenal nodule  * Noted on CT.  Was present on previous imaging as well.  Likely benign  * A.m. cortisol 1/29, normal at 43.9     Hypoalbuminemia, albumin 2.5     Hyperuricemia  * uric acid 8.9 on 1/29/23          Diet: Snacks/Supplements Adult: Other; L: gel20, D: magic cup; With Meals  Combination Diet Regular Diet; Thin Liquids (level 0) (no straws)    DVT Prophylaxis: Warfarin with heparin increase  [place] Catheter: Not " "present  Lines: None     Cardiac Monitoring: None  Code Status: Full Code      Clinically Significant Risk Factors              # Hypoalbuminemia: Lowest albumin = 2.3 g/dL at 2/13/2023  8:26 AM, will monitor as appropriate           # Severe Obesity: Estimated body mass index is 48.58 kg/m  as calculated from the following:    Height as of this encounter: 1.676 m (5' 6\").    Weight as of this encounter: 136.5 kg (301 lb).          Disposition Plan      Expected Discharge Date: 02/15/2023    Discharge Delays: Placement - TCU              Kourtney Lugo MD  Hospitalist Service    Securely message with [employer] (more info)  Text page via [email or web address] Paging/Directory   ______________________________________________________________________    Interval History   Patient with no new complaints.  Worried that she has to go to TCU where she previously did not have a good experience when her mother was at [facility].  Notified by nurse that the sister had concerns and wanted to talk to me.  Called her sister twice and was unable to connect with her and so left a voice message.    Physical Exam   Vital Signs: Temp: 97.7  F (36.5  C) Temp src: Oral BP: 131/69 Pulse: 82   Resp: 17 SpO2: 96 % O2 Device: None (Room air)    Weight: 301 lbs 0 oz    Exam:  Constitutional: Awake, alert and no distress. Appears comfortable  Head: Normocephalic. No masses, lesions, tenderness or abnormalities  ENT: ENT exam normal, no neck nodes or sinus tenderness  Cardiovascular: RRR.  No murmurs, no rubs or JVD  Respiratory: Normal WOB,b/l equal air entry, no wheezes or crackles   Gastrointestinal: Abdomen soft, non-tender. BS normal. No masses, organomegaly  : Deferred  Extremities : No edema , no clubbing or cyanosis    Neurologic: Cranial nerves II-XII grossly intact , power symmetrical, Reflexes normal and symmetric. Sensation grossly WNL.      Medical Decision Making       36 MINUTES SPENT BY ME on the date of service " doing chart review, history, exam, documentation & further activities per the note.      Data     I have personally reviewed the following data over the past 24 hrs:    N/A  \   8.0 (L)   / 132 (L)     145 110 (H) 29.1 (H) /  88   4.0 26 2.26 (H) \       ALT: 21 AST: 41 (H) AP: 82 TBILI: 0.2   ALB: 2.3 (L) TOT PROTEIN: 5.6 (L) LIPASE: N/A       INR:  1.42 (H) PTT:  N/A   D-dimer:  N/A Fibrinogen:  N/A       Imaging results reviewed over the past 24 hrs:   No results found for this or any previous visit (from the past 24 hour(s)).  Recent Labs   Lab 02/13/23  0826 02/12/23  0731 02/11/23  1449 02/11/23  0749 02/10/23  0730 02/10/23  0730   WBC  --  33.9*  --  39.0*  --  34.2*   HGB 8.0* 8.3*  --  8.3*  --  7.7*   MCV 92 93  --  91  --  91   * 137*  --  142*  --  124*   INR 1.42* 1.33*  --  1.30*  --  1.38*    142  --  142  --  141   POTASSIUM 4.0 4.0 3.5 3.3*  --  3.5   CHLORIDE 110* 110*  --  108*  --  108*   CO2 26 25  --  25  --  25   BUN 29.1* 33.7*  --  33.4*  --  36.5*   CR 2.26* 2.33*  --  2.55*  --  2.57*   ANIONGAP 9 7  --  9  --  8   MARIA INES 8.3* 8.2*  --  8.1*  --  8.1*   GLC 88 101*  --  94  --  95   ALBUMIN 2.3* 2.3*  --  2.3*   < >  --    PROTTOTAL 5.6* 5.4*  --  5.4*   < >  --    BILITOTAL 0.2 <0.2  --  <0.2   < >  --    ALKPHOS 82 83  --  88   < >  --    ALT 21 20  --  18   < >  --    AST 41* 43*  --  43*   < >  --     < > = values in this interval not displayed.

## 2023-02-13 NOTE — PROGRESS NOTES
[facility]     Renal Progress Note       SHORTHAND KEY FOR MY NOTES:  c = with, s = without, p = after, a = before, x = except, asx = asymptomatic, tx = transplant or treatment, sx = symptoms or symptomatic, cx = canceled or culture, rxn = reaction, yday = yesterday, nl = normal, abx = antibiotics, fxn = function, dx = diagnosis, dz = disease, m/h = melena/hematochezia, c/d/l/ha = cramping/dizziness/lightheadedness/headache, d/c = discharge or diarrhea/constipation, f/c/n/v = fevers/chills/nausea/vomiting, cp/sob = chest pain/shortness of breath, tbv = total body volume, rxn = reaction, tdc = tunneled dialysis catheter, pta = prior to admission, hd = hemodialysis, pd = peritoneal dialysis, hhd = home hemodialysis, edw = estimated dry wt         Assessment/Plan:     1.  KRISS/CKD IV.  Pt's cr is basically stable in the low-to-mid 2s.  Good uo noted.  She will need outpt f/u in our office when she is able to come to clinic.  A.  Will ask CC to arrange a hosp f/u visit in 6 wks.  B.  Check BMP weekly at the TCU x 6 wks.    Thank you for this consultation.  I will sign off.  Please call if any questions.        Interval History:     Pt feels ok and has no complaints.  Denies any uremic sx.  She is awaiting a therapeutic INR and is on a heparin gtt.            Medications and Allergies:       ARIPiprazole  5 mg Oral Daily     atorvastatin  40 mg Oral QPM     [START ON 2/14/2023] cyanocobalamin  1,000 mcg Sublingual Daily     DULoxetine  120 mg Oral Daily     folic acid  2 mg Oral Daily     gabapentin  100 mg Oral TID     miconazole   Topical BID     multivitamin w/minerals  1 tablet Oral Daily     nystatin  500,000 Units Swish & Spit 4x Daily     pantoprazole  40 mg Oral BID AC     sodium chloride (PF)  3 mL Intracatheter Q8H     warfarin ANTICOAGULANT  7.5 mg Oral ONCE at 18:00     Warfarin Therapy Reminder  1 each Oral See Admin Instructions     Allergies   Allergen Reactions     Adhesive Tape  "Blisters     Erythromycin Rash          Physical Exam:     Vitals were reviewed     , Blood pressure 131/69, pulse 82, temperature 97.7  F (36.5  C), temperature source Oral, resp. rate 17, height 1.676 m (5' 6\"), weight 136.5 kg (301 lb), SpO2 96 %, not currently breastfeeding.  Wt Readings from Last 3 Encounters:   02/08/23 136.5 kg (301 lb)   01/17/23 129.3 kg (285 lb)   12/06/22 129.3 kg (285 lb)     Intake/Output Summary (Last 24 hours) at 2/13/2023 1409  Last data filed at 2/13/2023 0631  Gross per 24 hour   Intake 240 ml   Output 2700 ml   Net -2460 ml     GENERAL APPEARANCE: pleasant, NAD, alert, sitting in chair  HEENT:  eyes/ears/nose/neck grossly nl  RESP: CTA B c good efforts  CV: RRR, nl S1/S2   ABDOMEN: o/s/nt/nd  EXTREMITIES/SKIN: + ble edema, rash         Data:     CBC RESULTS:     Recent Labs   Lab 02/13/23  0826 02/12/23  0731 02/11/23  0749 02/10/23  0730 02/09/23  1345 02/09/23  0758   WBC 35.5* 33.9* 39.0* 34.2* 28.0* 22.3*   RBC 2.80* 2.83* 2.87* 2.66* 2.89* 2.55*   HGB 8.0* 8.3* 8.3* 7.7* 8.2* 7.4*   HCT 25.7* 26.2* 26.2* 24.2* 26.0* 22.9*   * 137* 142* 124* 93* 102*     Basic Metabolic Panel:  Recent Labs   Lab 02/13/23  0826 02/12/23  0731 02/11/23  1449 02/11/23  0749 02/10/23  0730 02/09/23  1500 02/09/23  0758 02/08/23  1454 02/08/23  0703    142  --  142 141  --  136  --  139   POTASSIUM 4.0 4.0 3.5 3.3* 3.5 3.6 3.3*   < > 3.2*   CHLORIDE 110* 110*  --  108* 108*  --  103  --  102   CO2 26 25  --  25 25  --  25  --  24   BUN 29.1* 33.7*  --  33.4* 36.5*  --  38.6*  --  40.8*   CR 2.26* 2.33*  --  2.55* 2.57*  --  2.57*  --  2.49*   GLC 88 101*  --  94 95  --  92  --  83   MARIA INES 8.3* 8.2*  --  8.1* 8.1*  --  7.9*  --  8.1*    < > = values in this interval not displayed.     INR  Recent Labs   Lab 02/13/23  0826 02/12/23  0731 02/11/23  0749 02/10/23  0730   INR 1.42* 1.33* 1.30* 1.38*      Attestation:   I have reviewed today's relevant vital signs, notes, medications, labs and " imaging.    Woody Beltran MD  [facility] - Nephrology  [phone]

## 2023-02-13 NOTE — PLAN OF CARE
Goal Outcome Evaluation:    1331-0722    AVSS on room air. A+Ox4, flat affect. Ax2 w/ ceiling lift, turn/reposition q2h. Working with PT & OT. Wounds under both breasts, abdominal fold, and R knee fold. Wound cares completed as ordered by WOC. IV dilaudid given prior to wound cares. R anterior knee abrasion cleansed w/ wound cleanser and new mepilex applied. Preventative mepilex to coccyx. R PIV running heparin gtt @ 1800 units/hr. Tolerating regular diet w/o nausea or vomiting. Incontinent of urine, utilizing purewick w/ good UOP. Had one large continent BM w/ bedpan. On potassium replacement protocol, K WNL.  Nephrology, heme/onc, cardiology consulted. SW following for discharge needs. Will continue with plan of care.

## 2023-02-13 NOTE — PLAN OF CARE
Goal Outcome Evaluation:      0700-1930:     A/Ox4. VSS on RA, slightly HTN at times. Denies pain at rest/SOB/N/V. Incont B/B, purewick in place w/ adequate UOP. R PIV infusing heparin @1800 units/hr. Lab Hep Xa checked in the AM- no rate change required. Up A2 w/ lift to the chair. Q2H T/R as pt allows, pt can turn well with 1 assist. Regular diet, no straw, good appetite. Wound cares under skin folds done this shift per plan of care. Pre-medicated with IV Dilaudid before wound cares per pt's request, which is very helpful. Plts 137, WBC 33.9 , Hgb 8.3, Hep Xa 0.59, INR 1.33.  BMP, CBC, and Hep Xa tomorrow AM lab ordered. PT/OT/Neph/Heme/Onc following. Discharge to TCU pending placement.

## 2023-02-14 ENCOUNTER — APPOINTMENT (OUTPATIENT)
Dept: PHYSICAL THERAPY | Facility: CLINIC | Age: 63
DRG: 871 | End: 2023-02-14
Payer: COMMERCIAL

## 2023-02-14 ENCOUNTER — APPOINTMENT (OUTPATIENT)
Dept: OCCUPATIONAL THERAPY | Facility: CLINIC | Age: 63
DRG: 871 | End: 2023-02-14
Payer: COMMERCIAL

## 2023-02-14 LAB
ANION GAP SERPL CALCULATED.3IONS-SCNC: 9 MMOL/L (ref 7–15)
BASOPHILS # BLD MANUAL: 0 10E3/UL (ref 0–0.2)
BASOPHILS NFR BLD MANUAL: 0 %
BUN SERPL-MCNC: 31 MG/DL (ref 8–23)
CALCIUM SERPL-MCNC: 8.4 MG/DL (ref 8.8–10.2)
CHLORIDE SERPL-SCNC: 107 MMOL/L (ref 98–107)
CREAT SERPL-MCNC: 2.3 MG/DL (ref 0.51–0.95)
DEPRECATED HCO3 PLAS-SCNC: 26 MMOL/L (ref 22–29)
EOSINOPHIL # BLD MANUAL: 0 10E3/UL (ref 0–0.7)
EOSINOPHIL NFR BLD MANUAL: 0 %
ERYTHROCYTE [DISTWIDTH] IN BLOOD BY AUTOMATED COUNT: 14.4 % (ref 10–15)
GFR SERPL CREATININE-BSD FRML MDRD: 23 ML/MIN/1.73M2
GLUCOSE SERPL-MCNC: 90 MG/DL (ref 70–99)
HCT VFR BLD AUTO: 25.2 % (ref 35–47)
HGB BLD-MCNC: 7.9 G/DL (ref 11.7–15.7)
INR PPP: 1.65 (ref 0.85–1.15)
LYMPHOCYTES # BLD MANUAL: 1.6 10E3/UL (ref 0.8–5.3)
LYMPHOCYTES NFR BLD MANUAL: 5 %
MCH RBC QN AUTO: 29.4 PG (ref 26.5–33)
MCHC RBC AUTO-ENTMCNC: 31.3 G/DL (ref 31.5–36.5)
MCV RBC AUTO: 94 FL (ref 78–100)
METAMYELOCYTES # BLD MANUAL: 4.1 10E3/UL
METAMYELOCYTES NFR BLD MANUAL: 13 %
MONOCYTES # BLD MANUAL: 1.3 10E3/UL (ref 0–1.3)
MONOCYTES NFR BLD MANUAL: 4 %
MYELOCYTES # BLD MANUAL: 0.3 10E3/UL
MYELOCYTES NFR BLD MANUAL: 1 %
NEUTROPHILS # BLD MANUAL: 24.6 10E3/UL (ref 1.6–8.3)
NEUTROPHILS NFR BLD MANUAL: 77 %
NRBC # BLD AUTO: 0.3 10E3/UL
NRBC BLD MANUAL-RTO: 1 %
PLAT MORPH BLD: ABNORMAL
PLATELET # BLD AUTO: 160 10E3/UL (ref 150–450)
POTASSIUM SERPL-SCNC: 3.8 MMOL/L (ref 3.4–5.3)
RBC # BLD AUTO: 2.69 10E6/UL (ref 3.8–5.2)
RBC MORPH BLD: ABNORMAL
SMUDGE CELLS BLD QL SMEAR: PRESENT
SODIUM SERPL-SCNC: 142 MMOL/L (ref 136–145)
UFH PPP CHRO-ACNC: 0.54 IU/ML
WBC # BLD AUTO: 31.9 10E3/UL (ref 4–11)

## 2023-02-14 PROCEDURE — 99233 SBSQ HOSP IP/OBS HIGH 50: CPT | Performed by: INTERNAL MEDICINE

## 2023-02-14 PROCEDURE — 85007 BL SMEAR W/DIFF WBC COUNT: CPT | Performed by: HOSPITALIST

## 2023-02-14 PROCEDURE — 97110 THERAPEUTIC EXERCISES: CPT | Mod: GO

## 2023-02-14 PROCEDURE — 97530 THERAPEUTIC ACTIVITIES: CPT | Mod: GP

## 2023-02-14 PROCEDURE — 250N000013 HC RX MED GY IP 250 OP 250 PS 637: Performed by: INTERNAL MEDICINE

## 2023-02-14 PROCEDURE — 85610 PROTHROMBIN TIME: CPT | Performed by: HOSPITALIST

## 2023-02-14 PROCEDURE — 93005 ELECTROCARDIOGRAM TRACING: CPT

## 2023-02-14 PROCEDURE — 250N000013 HC RX MED GY IP 250 OP 250 PS 637: Performed by: HOSPITALIST

## 2023-02-14 PROCEDURE — 250N000011 HC RX IP 250 OP 636: Performed by: PHYSICIAN ASSISTANT

## 2023-02-14 PROCEDURE — 36415 COLL VENOUS BLD VENIPUNCTURE: CPT | Performed by: INTERNAL MEDICINE

## 2023-02-14 PROCEDURE — 97535 SELF CARE MNGMENT TRAINING: CPT | Mod: GO

## 2023-02-14 PROCEDURE — 93010 ELECTROCARDIOGRAM REPORT: CPT | Performed by: INTERNAL MEDICINE

## 2023-02-14 PROCEDURE — 85027 COMPLETE CBC AUTOMATED: CPT | Performed by: HOSPITALIST

## 2023-02-14 PROCEDURE — 250N000013 HC RX MED GY IP 250 OP 250 PS 637: Performed by: NURSE PRACTITIONER

## 2023-02-14 PROCEDURE — 85520 HEPARIN ASSAY: CPT | Performed by: INTERNAL MEDICINE

## 2023-02-14 PROCEDURE — 250N000011 HC RX IP 250 OP 636: Performed by: HOSPITALIST

## 2023-02-14 PROCEDURE — 120N000001 HC R&B MED SURG/OB

## 2023-02-14 PROCEDURE — 82310 ASSAY OF CALCIUM: CPT | Performed by: INTERNAL MEDICINE

## 2023-02-14 RX ADMIN — MICONAZOLE NITRATE: 20 CREAM TOPICAL at 13:13

## 2023-02-14 RX ADMIN — MICONAZOLE NITRATE: 20 CREAM TOPICAL at 01:39

## 2023-02-14 RX ADMIN — MICONAZOLE NITRATE: 20 CREAM TOPICAL at 23:11

## 2023-02-14 RX ADMIN — HEPARIN SODIUM 1800 UNITS/HR: 10000 INJECTION, SOLUTION INTRAVENOUS at 06:46

## 2023-02-14 RX ADMIN — PANTOPRAZOLE SODIUM 40 MG: 40 TABLET, DELAYED RELEASE ORAL at 15:45

## 2023-02-14 RX ADMIN — HEPARIN SODIUM 1800 UNITS/HR: 10000 INJECTION, SOLUTION INTRAVENOUS at 20:13

## 2023-02-14 RX ADMIN — ATORVASTATIN CALCIUM 40 MG: 40 TABLET, FILM COATED ORAL at 19:31

## 2023-02-14 RX ADMIN — WARFARIN SODIUM 7.5 MG: 5 TABLET ORAL at 17:31

## 2023-02-14 RX ADMIN — NYSTATIN 500000 UNITS: 100000 SUSPENSION ORAL at 08:39

## 2023-02-14 RX ADMIN — GABAPENTIN 100 MG: 100 CAPSULE ORAL at 08:39

## 2023-02-14 RX ADMIN — NYSTATIN 500000 UNITS: 100000 SUSPENSION ORAL at 17:31

## 2023-02-14 RX ADMIN — MULTIPLE VITAMINS W/ MINERALS TAB 1 TABLET: TAB at 08:39

## 2023-02-14 RX ADMIN — PANTOPRAZOLE SODIUM 40 MG: 40 TABLET, DELAYED RELEASE ORAL at 06:46

## 2023-02-14 RX ADMIN — HYDROMORPHONE HYDROCHLORIDE 0.2 MG: 0.2 INJECTION, SOLUTION INTRAMUSCULAR; INTRAVENOUS; SUBCUTANEOUS at 19:56

## 2023-02-14 RX ADMIN — ARIPIPRAZOLE 5 MG: 5 TABLET ORAL at 08:39

## 2023-02-14 RX ADMIN — DULOXETINE HYDROCHLORIDE 120 MG: 60 CAPSULE, DELAYED RELEASE ORAL at 08:39

## 2023-02-14 RX ADMIN — NYSTATIN 500000 UNITS: 100000 SUSPENSION ORAL at 21:03

## 2023-02-14 RX ADMIN — GABAPENTIN 100 MG: 100 CAPSULE ORAL at 19:31

## 2023-02-14 RX ADMIN — Medication 1000 MCG: at 08:39

## 2023-02-14 RX ADMIN — NYSTATIN 500000 UNITS: 100000 SUSPENSION ORAL at 13:13

## 2023-02-14 RX ADMIN — GABAPENTIN 100 MG: 100 CAPSULE ORAL at 13:13

## 2023-02-14 RX ADMIN — FOLIC ACID 2 MG: 1 TABLET ORAL at 08:39

## 2023-02-14 ASSESSMENT — ACTIVITIES OF DAILY LIVING (ADL)
ADLS_ACUITY_SCORE: 58

## 2023-02-14 NOTE — PROGRESS NOTES
[facility]  Hospitalist Progress Note    Date of Admission: 1/28/2023    Interval History   Patient seen with her sister at the bedside.  Discussion regarding her hospital course.  Her sister is most concerned about her discharge to rehab facility she will go to.  In addition she was worried that patient would not have close follow-up with the rheumatology.  Methotrexate has been held given her current hospital stay  She is also concerned that some of the infectious sources may return  Her sister felt the patient was declining since June and not sure why     Assessment & Plan    Shira Rodriguez is a 62-year-old female with rheumatoid arthritis on methotrexate, prior massive PE on warfarin, morbid obesity who presented on 1/28/2023 with severe sepsis.  She was found unresponsive in her home. She  Was hypotensive and had temp of 100.2 in route to hospital.      Severe sepsis-present on admission  Candida intertrigo  Neutropenic fever, resolved, now with leukocytosis likely secondary to Neupogen  Lactic acidosis, Resolved     Patient admitted 1/28 with sepsis Lactate of 3.2.  and WBC 0.4. Severe sepsis on admission     Given fluids and responded with no need for ICU/pressor     DDX include panniculitis (significant redness under abd pannus and breasts) or mucositis (severe throat pain x 2 days) and duodenitis with severe neutropenia.    CT C/A/P showed small chronic left pleural effusion, small fluid around distal esophagus and duodenum    TTE 1/30 with possible vegetation; LETA 1/31 negative for vegetation or dissection    repeat CT chest 2/4 (due to cough) with slight increase in bilateral pleural effusions, mild increase in atelectasis      ID consulted with extensive infectious work-up: positive fungitell (significance unclear), positive EBV IgG (but negative IgM and DNA)    negative infectious work-up:  UA and culture, blood cultures, COVID/RSV/flu, Hep B&C, HIV, HSV, CMV,  aspergillus, Blastomyces, histoplasmosis, anaplasma, Rickettsia, Ehrlichia     Patient completed 5 day course of doxycycline 2/3 (empiric tick born illness)     Completed 7 days course of fluconazole 2/6  (candida intertrigo)     Cefepime 1/30- 2/8 while neutropenic. >> stopped when counts recovered     Blood cultures 2/7 NGTD X 2    1/29 + Beta D glucan fungitell     WBC 31,00 s/p neuopen 2/7         Pancytopenia with severe leukopenia and neutropenia, moderate anemia and thrombocytopenia  Severe vitamin B12 deficiency and folate deficiency    CBC wnl 12/7/2022;     On admission 1/28 - Hb 9.4, WBC 0.4, ANC 0.1, platelets 59    Iron studies 1/29 indicating anemia of chronic disease.  Reticulocyte count suppressed.  B12 undetectable and folate low at 2.8     initiated on B12 and folate, ultimately started on leucovorin rescue 2/2-2/5 per Heme    methotrexate level 1/30 undetectable      Peripheral smear 1/29 showed moderate to marked pancytopenia. Negative for schistocytes and elevated fibrinogen argues against DIC. Negative for circulating blasts on scanning.     flow cytometry shows polytypic B cells, no aberrant immunophenotype on T cells and rare to absent CD34 positive blasts    elevated Kappa and lambda free light chains, monoclonal IgM immunoglobulin of lambda light chain type, IgM is elevated at 395 and IgG and IgA are within normal limits      immunological work-up showed C3 and C4 wnl, ANCA and ds-DNA negative\    s/p bone marrow biopsy on 2/3/2023 with no clear etiology (see report for details) but no current evidence for neoplasm or aplastic anemia - Heme suspects hypocellularity primarily due to methotrexate, B12 and folate deficiency     received 1U PRBC 1/31 and 2/4    received daily platelet transfusions 1/30-2/5 for plt <50K     repeat B12 level 2/8 >4K; will need ongoing outpatient monitoring and monthly B12 injections    received daily Neupogen 1/30-2/7; stopped with recovery of cell  counts    2/14/2023 WBC 31,900, HGB 7.9 and platelet 160     Continue folate.     B12 supplement.     BM cytogenetic studies pending     per Heme; transfuse for plt <20K or signs of bleeding; hgb <7 g/dl        KRISS on CKD stage 4, resolved  Mild anion gap metabolic acidosis, resolved      Normal renal function in 2021, Cr 1.7 5/2022, more recent baseline creatinine 2-2.2.   Creatinine on admission 3.25.    Nephrology consulted, suspect pre-renal with possible progression to ATN, treated with IVF with gradual improvement    Neph signed off 2/3.     Neph re-consulted 2/8 due to worsening renal function of unclear etiology, no improvement with fluid bolus    Urine eosinophil negative    Creatinine 2.3       Probable esophagitis  Probable duodenitis  Dysphagia   Mucositis     she has been complaining of throat pain; oral ulcers presents on exam      CT on admission showed small amount of fluid adjacent to distal thoracic esophagus and scattered posterior mediastinal lymph nodes along esophagus of uncertain etiology but could be secondary to esophagitis or other inflammatory process.  CT scan also showed small edema around second and third portion of duodenum.     SLP following, has advanced to reg diet     continue Protonix    odynophagia has resolved and mouth pain improving     Panniculitis, improving  Candidal rash-under pannus, breasts, improving  * treated with fluconazole 1/30-2/6 per ID  - Wound care on board , patient getting wound care and received  - continue miconazole cream     Hypokalemia  -Being replaced     Type II MI due to severe sepsis     High-sensitivity troponin mildly elevated at 48 and repeat was normal and can be due to demand     TTE 1/30 with normal LV function without WMA, RV with mildly decreased systolic function     History of massive PE causing PEA arrest, 9/2019    last dose of warfarin was 1/29, but INR has remained therapeutic through 2/5.     as above, significant discrepancy between  "GFR calculation with cystatin c vs Cr; and so was started on heparin gtt bridge to coumadin, INR today 1.65    pharmacy managing Coumadin dose, continue heparin until therapeutic INR     Rheumatoid arthritis   Historically on methotrexate, resumed about 2 weeks prior to admission due to worsening back and shoulder pain.     Previous provider had sent inbox message to outpatient rheumatologist, Dr. Austin at [facility], on 2/8 and he plans for clinic to contact her to schedule f/u appt within 1 month    -Hold methotrexate, follow up with outpatient rheumatologist         COPD  chronic cough  * has chronic cough for years and follows with pulmonary at [facility]  * CXR 2/3 showed mild opacity at the left lung base is new since the previous exam - atelectasis vs pneumonia.   * CT chest 2/4 with findings as above   - no signs of acute exacerbation     Chronic major depression  Chronic pain  * see recent pain clinic visit summary from 1/17/2023 for background information  - Continue Abilify, Cymbalta and Neurontin     Essential hypertension  PTA lisinopril initially held due to KRISS and hypotension, which have resolved.  - remains normotensive at this time, resume as indicated     Morbid obesity, BMI 46  Estimated body mass index is 45.9 kg/m  as calculated from the following:    Height as of this encounter: 1.676 m (5' 6\").    Weight as of this encounter: 129 kg (284 lb 6.3 oz).     Acute metabolic encephalopathy secondary to severe sepsis, resolved  Admission head CT negative for acute pathology.  Returned to baseline mental status with treatment of sepsis.      Mild hypovolemic hyponatremia, resolved  Admission sodium 132, normalized with IVF.     Sodium 142      Hypomagnesemia, resolved  * replaced per protocol     3 cm right adrenal nodule  * Noted on CT.  Was present on previous imaging as well.  Likely benign  * A.m. cortisol 1/29, normal at 43.9     Hypoalbuminemia, albumin 2.5     Hyperuricemia  * uric acid 8.9 on " 1/29/23      Disposition:     Anticipate TCU      Diet: Snacks/Supplements Adult: Other; L: gel20, D: magic cup; With Meals  Combination Diet Regular Diet; Thin Liquids (level 0) (no straws)  [place]wood Catheter: Not present  DVT Prophylaxis: back on coumadin   Code Status: Full Code       Expected Discharge Date: 02/16/2023    Discharge Delays: Placement - TCU           KELVIN LEDEZMA MD,   Hospitalist Service  [facility]  ______________________________________________________________________    -Data reviewed today: I reviewed all new labs and imaging results over the last 24 hours. I personally reviewed no images or EKG's today.    Physical Exam   Temp: 98.2  F (36.8  C) Temp src: Oral BP: (!) 142/84 Pulse: 83   Resp: 18 SpO2: 93 % O2 Device: None (Room air)    Vitals:    02/08/23 0601 02/13/23 1529 02/14/23 0711   Weight: 136.5 kg (301 lb) 112.9 kg (249 lb) 112.9 kg (249 lb)   Constitutional: Appears stated age, no acute distress.  Respiratory: Breath sounds CTA. No increased work of breathing.  Cardiovascular: RRR, no rub or murmur.  GI: Soft, non-tender, non-distended.obesity   Skin: Warm, dry, no rashes or lesions.  Other: Rash in posterior erythema with plaque   Periwick in place     Medications     heparin 1,800 Units/hr (02/14/23 0646)       ARIPiprazole  5 mg Oral Daily     atorvastatin  40 mg Oral QPM     cyanocobalamin  1,000 mcg Sublingual Daily     DULoxetine  120 mg Oral Daily     folic acid  2 mg Oral Daily     gabapentin  100 mg Oral TID     miconazole   Topical BID     multivitamin w/minerals  1 tablet Oral Daily     nystatin  500,000 Units Swish & Spit 4x Daily     pantoprazole  40 mg Oral BID AC     sodium chloride (PF)  3 mL Intracatheter Q8H     warfarin ANTICOAGULANT  7.5 mg Oral ONCE at 18:00     Warfarin Therapy Reminder  1 each Oral See Admin Instructions       Data   Recent Labs   Lab 02/14/23  0714 02/13/23  0826 02/12/23  0731   WBC 31.9* 35.5* 33.9*   HGB 7.9* 8.0*  8.3*   MCV 94 92 93    132* 137*   INR 1.65* 1.42* 1.33*    145 142   POTASSIUM 3.8 4.0 4.0   CHLORIDE 107 110* 110*   CO2 26 26 25   BUN 31.0* 29.1* 33.7*   CR 2.30* 2.26* 2.33*   ANIONGAP 9 9 7   MARIA INES 8.4* 8.3* 8.2*   GLC 90 88 101*   ALBUMIN  --  2.3* 2.3*   PROTTOTAL  --  5.6* 5.4*   BILITOTAL  --  0.2 <0.2   ALKPHOS  --  82 83   ALT  --  21 20   AST  --  41* 43*       Imaging:  No results found for this or any previous visit (from the past 24 hour(s)).

## 2023-02-14 NOTE — PROGRESS NOTES
Hospitalist service cross-cover note:     Paged regarding HR in 40s and irregular rhythm, EKG obtained which demonstrates PACs.     Fernando Montez MD   Hospitalist  [phone]

## 2023-02-14 NOTE — PROGRESS NOTES
Care Management Follow Up    Length of Stay (days): 16    Expected Discharge Date: 02/15/2023     Concerns to be Addressed:       Patient plan of care discussed at interdisciplinary rounds: Yes    Anticipated Discharge Disposition: Transitional Care     Anticipated Discharge Services: None  Anticipated Discharge DME: None    Patient/family educated on Medicare website which has current facility and service quality ratings: yes  Education Provided on the Discharge Plan:    Patient/Family in Agreement with the Plan: yes    Referrals Placed by CM/SW:    Private pay costs discussed: Not applicable    Additional Information:  Writer received a call from patients sister Gina. Gina states that patient is extremely medically complex and would like her transferred to the [facility] so she can see a rheumatologist. Writer explained that is not a decision writer can make. Gina states that it is writers job to advocate for a move. Writer explained we can look into it but ultimately that is a MD decision. Gina also states she would like to have her sister go to  TCU so she is close to the MD's at the [facility] if anything were to happen. Gina states that she is so medically complex that  TCU should take her no problem. Writer let her know that we can send a referral but no guarantee.     SW will continue to follow       CHARLENE Burns

## 2023-02-14 NOTE — PLAN OF CARE
Goal Outcome Evaluation:    1500 - 2330     Patient is A+O x 4 with flat affect.  VSS on room air. Ax2 w/ ceiling lift, turn/reposition q2h. Working with PT & OT. Wounds under both breasts, abdominal fold and R knee fold. Wound cares as ordered by WOC due to be completed on overnight shift.   Please give IV dilaudid prior to wound cares. R anterior knee abrasion cleansed w/ wound cleanser and new mepilex applied. Preventative mepilex to coccyx. R PIV running heparin gtt @ 1800 units/hr. Tolerating regular diet w/o nausea or vomiting. Incontinent of urine, utilizing purewick w/ good UOP. On potassium replacement protocol - values normal this shift.    Nephrology, heme/onc, cardiology consulted. SW following for discharge needs. Will continue with plan of care.

## 2023-02-14 NOTE — PLAN OF CARE
Goal Outcome Evaluation:    5722-6029     AVSS on room air. A+Ox4, flat affect. Ax2 w/ ceiling lift, turn/reposition q2h. Working with PT & OT. Wounds under both breasts, abdominal fold, and R knee fold. Wound cares completed as ordered by WOC. Declined need for IV dilaudid prior to wound cares. R anterior knee abrasion cleansed w/ wound cleanser and new mepilex applied. Preventative mepilex to coccyx. R PIV running heparin gtt @ 1800 units/hr. HepXa WDL, recheck scheduled for tomorrow morning. Tolerating regular diet w/o nausea or vomiting. Incontinent of urine, utilizing purewick w/ good UOP. Intermittently incontinent of BM, LBM noted 2/13.  On potassium replacement protocol, K+ WNL.  Nephrology, heme/onc, cardiology consulted. SW following for discharge needs. Will continue with plan of care.

## 2023-02-14 NOTE — PLAN OF CARE
[id number]    A/Ox4, VSS on RA. HR is WNL but may appear in low 40s b/c of bigeminal PACs, EKG was done to confirm, check for this issue if you record a low heart rate. A2 w/ ceiling lift. Pain w/ dressing changes, give PRN dilaudid before wound cares. Wounds under both breasts, panis, and R knee fold, wound cares completed per orders from WOC. T/Rq2hr. Preventative mepi on cocyx. R PIV running heparin @ 1800units/hr. Hep recheck in the AM. Regular diet. Incont of bladder, purewick w/ good UOP. Uses bedpan for Bms. K+ replacement protocol. Neph, heme/onc, cardiology consulted. SW following for discharge needs, looking for TCU placement once medically stable.

## 2023-02-15 ENCOUNTER — APPOINTMENT (OUTPATIENT)
Dept: PHYSICAL THERAPY | Facility: CLINIC | Age: 63
DRG: 871 | End: 2023-02-15
Payer: COMMERCIAL

## 2023-02-15 ENCOUNTER — APPOINTMENT (OUTPATIENT)
Dept: OCCUPATIONAL THERAPY | Facility: CLINIC | Age: 63
DRG: 871 | End: 2023-02-15
Payer: COMMERCIAL

## 2023-02-15 LAB
BASOPHILS # BLD MANUAL: 0 10E3/UL (ref 0–0.2)
BASOPHILS NFR BLD MANUAL: 0 %
BLASTS # BLD MANUAL: 0.6 10E3/UL
BLASTS NFR BLD MANUAL: 2 %
EOSINOPHIL # BLD MANUAL: 0 10E3/UL (ref 0–0.7)
EOSINOPHIL NFR BLD MANUAL: 0 %
ERYTHROCYTE [DISTWIDTH] IN BLOOD BY AUTOMATED COUNT: 14.5 % (ref 10–15)
HCT VFR BLD AUTO: 26.3 % (ref 35–47)
HGB BLD-MCNC: 8 G/DL (ref 11.7–15.7)
INR PPP: 2.02 (ref 0.85–1.15)
LYMPHOCYTES # BLD MANUAL: 3.4 10E3/UL (ref 0.8–5.3)
LYMPHOCYTES NFR BLD MANUAL: 12 %
MCH RBC QN AUTO: 28.4 PG (ref 26.5–33)
MCHC RBC AUTO-ENTMCNC: 30.4 G/DL (ref 31.5–36.5)
MCV RBC AUTO: 93 FL (ref 78–100)
METAMYELOCYTES # BLD MANUAL: 3.4 10E3/UL
METAMYELOCYTES NFR BLD MANUAL: 12 %
MONOCYTES # BLD MANUAL: 1.7 10E3/UL (ref 0–1.3)
MONOCYTES NFR BLD MANUAL: 6 %
MYELOCYTES # BLD MANUAL: 2.3 10E3/UL
MYELOCYTES NFR BLD MANUAL: 8 %
NEUTROPHILS # BLD MANUAL: 17 10E3/UL (ref 1.6–8.3)
NEUTROPHILS NFR BLD MANUAL: 60 %
PLAT MORPH BLD: ABNORMAL
PLATELET # BLD AUTO: 182 10E3/UL (ref 150–450)
POTASSIUM SERPL-SCNC: 3.8 MMOL/L (ref 3.4–5.3)
RBC # BLD AUTO: 2.82 10E6/UL (ref 3.8–5.2)
RBC MORPH BLD: ABNORMAL
SMUDGE CELLS BLD QL SMEAR: PRESENT
UFH PPP CHRO-ACNC: 0.68 IU/ML
WBC # BLD AUTO: 28.3 10E3/UL (ref 4–11)

## 2023-02-15 PROCEDURE — 84132 ASSAY OF SERUM POTASSIUM: CPT | Performed by: INTERNAL MEDICINE

## 2023-02-15 PROCEDURE — 250N000011 HC RX IP 250 OP 636: Performed by: HOSPITALIST

## 2023-02-15 PROCEDURE — 250N000013 HC RX MED GY IP 250 OP 250 PS 637: Performed by: INTERNAL MEDICINE

## 2023-02-15 PROCEDURE — 85520 HEPARIN ASSAY: CPT | Performed by: INTERNAL MEDICINE

## 2023-02-15 PROCEDURE — 120N000001 HC R&B MED SURG/OB

## 2023-02-15 PROCEDURE — 36415 COLL VENOUS BLD VENIPUNCTURE: CPT | Performed by: HOSPITALIST

## 2023-02-15 PROCEDURE — 250N000013 HC RX MED GY IP 250 OP 250 PS 637: Performed by: HOSPITALIST

## 2023-02-15 PROCEDURE — 97116 GAIT TRAINING THERAPY: CPT | Mod: GP

## 2023-02-15 PROCEDURE — 97110 THERAPEUTIC EXERCISES: CPT | Mod: GO

## 2023-02-15 PROCEDURE — 97530 THERAPEUTIC ACTIVITIES: CPT | Mod: GP

## 2023-02-15 PROCEDURE — G0463 HOSPITAL OUTPT CLINIC VISIT: HCPCS

## 2023-02-15 PROCEDURE — 80048 BASIC METABOLIC PNL TOTAL CA: CPT | Performed by: INTERNAL MEDICINE

## 2023-02-15 PROCEDURE — 85027 COMPLETE CBC AUTOMATED: CPT | Performed by: HOSPITALIST

## 2023-02-15 PROCEDURE — 99232 SBSQ HOSP IP/OBS MODERATE 35: CPT | Performed by: INTERNAL MEDICINE

## 2023-02-15 PROCEDURE — 85007 BL SMEAR W/DIFF WBC COUNT: CPT | Performed by: HOSPITALIST

## 2023-02-15 PROCEDURE — 250N000013 HC RX MED GY IP 250 OP 250 PS 637: Performed by: NURSE PRACTITIONER

## 2023-02-15 PROCEDURE — 97535 SELF CARE MNGMENT TRAINING: CPT | Mod: GO

## 2023-02-15 PROCEDURE — 85610 PROTHROMBIN TIME: CPT | Performed by: HOSPITALIST

## 2023-02-15 RX ADMIN — Medication 1000 MCG: at 09:01

## 2023-02-15 RX ADMIN — FOLIC ACID 2 MG: 1 TABLET ORAL at 09:01

## 2023-02-15 RX ADMIN — GABAPENTIN 100 MG: 100 CAPSULE ORAL at 19:54

## 2023-02-15 RX ADMIN — WARFARIN SODIUM 7.5 MG: 5 TABLET ORAL at 19:04

## 2023-02-15 RX ADMIN — PANTOPRAZOLE SODIUM 40 MG: 40 TABLET, DELAYED RELEASE ORAL at 06:39

## 2023-02-15 RX ADMIN — MULTIPLE VITAMINS W/ MINERALS TAB 1 TABLET: TAB at 09:01

## 2023-02-15 RX ADMIN — GABAPENTIN 100 MG: 100 CAPSULE ORAL at 09:01

## 2023-02-15 RX ADMIN — GABAPENTIN 100 MG: 100 CAPSULE ORAL at 14:33

## 2023-02-15 RX ADMIN — ARIPIPRAZOLE 5 MG: 5 TABLET ORAL at 09:01

## 2023-02-15 RX ADMIN — HEPARIN SODIUM 1800 UNITS/HR: 10000 INJECTION, SOLUTION INTRAVENOUS at 10:19

## 2023-02-15 RX ADMIN — NYSTATIN 500000 UNITS: 100000 SUSPENSION ORAL at 14:33

## 2023-02-15 RX ADMIN — MICONAZOLE NITRATE: 20 CREAM TOPICAL at 22:41

## 2023-02-15 RX ADMIN — DULOXETINE HYDROCHLORIDE 120 MG: 60 CAPSULE, DELAYED RELEASE ORAL at 09:01

## 2023-02-15 RX ADMIN — NYSTATIN 500000 UNITS: 100000 SUSPENSION ORAL at 22:39

## 2023-02-15 RX ADMIN — NYSTATIN 500000 UNITS: 100000 SUSPENSION ORAL at 19:04

## 2023-02-15 RX ADMIN — ATORVASTATIN CALCIUM 40 MG: 40 TABLET, FILM COATED ORAL at 19:54

## 2023-02-15 RX ADMIN — MICONAZOLE NITRATE: 20 CREAM TOPICAL at 15:04

## 2023-02-15 RX ADMIN — PANTOPRAZOLE SODIUM 40 MG: 40 TABLET, DELAYED RELEASE ORAL at 16:43

## 2023-02-15 RX ADMIN — NYSTATIN 500000 UNITS: 100000 SUSPENSION ORAL at 09:01

## 2023-02-15 ASSESSMENT — ACTIVITIES OF DAILY LIVING (ADL)
ADLS_ACUITY_SCORE: 54
ADLS_ACUITY_SCORE: 58
ADLS_ACUITY_SCORE: 54
ADLS_ACUITY_SCORE: 54
ADLS_ACUITY_SCORE: 52
ADLS_ACUITY_SCORE: 52
ADLS_ACUITY_SCORE: 58
ADLS_ACUITY_SCORE: 58
ADLS_ACUITY_SCORE: 52
ADLS_ACUITY_SCORE: 48
ADLS_ACUITY_SCORE: 54
ADLS_ACUITY_SCORE: 58

## 2023-02-15 NOTE — PLAN OF CARE
Goal Outcome Evaluation:    9954-4026    Patient is A+O x 4 with flat affect.  VSS on room air. Ax2 w/ ceiling lift, turn/reposition q2h. Working with PT & OT.  Patient took IV dilaudid prior to wound cares. R anterior knee abrasion cleansed w/ wound cleanser and new mepilex applied. Preventative mepilex to coccyx. R PIV running heparin gtt @ 1800 units/hr. HepXa WDL, recheck scheduled for tomorrow morning. Tolerating regular diet w/o nausea or vomiting. Incontinent of urine, utilizing purewick w/ good UOP. Intermittently incontinent of BM, LBM noted 2/13.  On potassium replacement protocol - values normal this shift.  Nephrology, heme/onc, cardiology consulted. SW following for discharge needs. Will continue with plan of care.

## 2023-02-15 NOTE — PROGRESS NOTES
CLINICAL NUTRITION SERVICES - REASSESSMENT NOTE      Recommendations:     Continue with magic cup supplement at dinner - will discontinue the GelateinPLUS supplement with lunch     Malnutrition: (2/10)  % Weight Loss:  None noted  % Intake:  Decreased intake does not meet criteria for malnutrition  - pt tolerating po well, ordering full meals  Subcutaneous Fat Loss:  None observed  Muscle Loss:  None observed  Fluid Retention: +trace edema morena feet/ankles     Malnutrition Diagnosis: Patient does not meet two of the above criteria necessary for diagnosing malnutrition       EVALUATION OF PROGRESS TOWARD GOALS   Diet:    Regular diet  Lunch - Gelatein PLUS  Dinner - Magic Cup    Intake/Tolerance:    Chart reviewed  Pt noted to be eating 100% of her meals  Ordering full meals TID    Stopped by to see pt this morning  Notes that she is tolerating po well  Likes the Magic Cup, but does not like the GelateinPLUS supplement      Previous Goals (2/10):   Pt to consume 75% meals and take 2 supplements per day  Evaluation: partially met - only taking 1 supplement per day    Previous Nutrition Diagnosis (2/10):   Increased nutrient needs (protein) related to higher needs for wound healing as evidenced by pt with noted to have open ulcerations  Evaluation: No change        CURRENT NUTRITION DIAGNOSIS  Increased nutrient needs (protein) related to higher needs for wound healing as evidenced by pt with noted to have open ulcerations    INTERVENTIONS  Recommendations / Nutrition Prescription  Regular diet    Continue with magic cup supplement at dinner - will discontinue the GelateinPLUS supplement with lunch        Goals  Pt to consume 75% meals and 1 supplement per day      MONITORING AND EVALUATION:  Progress towards goals will be monitored and evaluated per protocol and Practice Guidelines

## 2023-02-15 NOTE — PROGRESS NOTES
[facility] Nurse Inpatient Assessment     Consulted for: Abdomen, bilateral breast, umbilicus, groin, right popliteal, gluteal folds    Patient History (according to provider note(s):      62-year-old female with rheumatoid arthritis on methotrexate, prior massive PE on warfarin, morbid obesity who presented on 1/28/2023 with severe sepsis.  She was found unresponsive in her home.  Was hypotensive, had temp of 100.2 in route to hospital.      Severe sepsis-present on admission  Neutropenic fever  Panniculitis   Candidal rash-under pannus, breasts    Areas Assessed:      Areas visualized during today's visit: Skin folds     Skin Injury Location: Bilateral breast, bilateral groin, pannus, umbilicus, right popliteal, gluteal fold  2-7-23 2-2-23 right breast      1/30/23 Right breast          2/7/23 2-2-23 left breast      1-30-23 Left Breast        2/7/23 mid pannus      2-2-23 1-30-23 Right abdomen      2/7/23 Left pannus      1-30-23 Left abdomen    1-30-23 Umbilicus                                            2/9 umbilica hernia        2/7/23right knee crease resolved       2-2-23 right knee crease (medial popliteal)      1-30-23 Right medial popliteal      2-2-23 gluteal fold    Skin injury due to: Fungal rash  and Intertrigo  Skin history and plan of care:  Patient on disability, patient has no assistance at home. Poor hygiene. Arrives with rashes and denudement within multiple skin folds. Many areas in pannus are evolving with large open ulcerations that are extremely painful       Skin assessment: all resolved except the umbilicus and right knee scab     Measurements (length x width x depth, in cm)   Umblicus 3.2 cm x 0.5cm x 1.5 cm 5 to 9 o'clock undermining      Color: pink      Temperature  normal      Drainage: moderate      Color: tan creamy       Odor: none   Pain: states not so bad, no longer needs pain medication   Pain interventions prior to dressing  change: slow and gentle cares   Treatment goal: Heal , Infection control/prevention and Decrease moisture  STATUS: significant improvement in size of wounds and decrease in moisture, rash resolved, pain significantly decreased, no longer needs premedication for dressing changes.    Supplies ordered: in room RNs had to order more kerlix rolls for treatment     Treatment Plan:     Breast folds and pannus fold, gluteal fold, right posterior knee fold every day   Fill folds with Vashe #[id number] moistened kerlix (likely need 4 rolls) x 15 minutes  Apply barrier cream     Right lower abdomen hernia - daily   Using a q tip tuck vashe moistened opened 2x2 in the undermining from 6 to 9 oclock x 15 minutes  Using a q tip fill that area with a small strip of remy/promogram piece #[id number]  No need to cover unless draining       Right knee and left lateral ankle/foot - apply sween cream 24 daily      Orders: updated     RECOMMEND PRIMARY TEAM ORDER: surgical consult for chronic abdominal/umbilicus hernia     Education provided: plan of care, wound progress, Moisture management and Hygiene  Discussed plan of care with: instructed how to cleanse and apply remy to wound bed.   WOC nurse follow-up plan: weekly   Notify WOC if wound(s) deteriorate.  Nursing to notify the Provider(s) and re-consult the WOC Nurse if new skin concern.    DATA:     Current support surface: Standard  Standard gel/foam mattress (IsoFlex, Atmos air, etc)  Containment of urine/stool: Incontinent pad in bed and Purewick external catheter   BMI: Body mass index is 40.19 kg/m .   Active diet order: Orders Placed This Encounter      Combination Diet Regular Diet; Thin Liquids (level 0) (no straws)     Output: I/O last 3 completed shifts:  In: 240 [P.O.:240]  Out: 2300 [Urine:2300]     Labs:   Recent Labs   Lab 02/15/23  0745 02/14/23  0714 02/13/23  0826   ALBUMIN  --   --  2.3*   HGB 8.0*   < > 8.0*   INR 2.02*   < > 1.42*   WBC 28.3*   < > 35.5*    < > =  values in this interval not displayed.     Pressure injury risk assessment:   Sensory Perception: 3-->slightly limited  Moisture: 3-->occasionally moist  Activity: 2-->chairfast  Mobility: 2-->very limited  Nutrition: 3-->adequate  Friction and Shear: 2-->potential problem  Ole Score: 15    Viky Glasgow RN CWOCN   Dept. Vocera- Contact [facility] Nurse ([place]) via  Vocera   Dept. Office Number: [phone]

## 2023-02-15 NOTE — PLAN OF CARE
[id number]    A/Ox4, VSS on RA. A2 w/ ceiling lift, T/R q2hr. Refuses turns at times. Denied pain. Wounds under breasts, abd fold and R knee fold, wound cares to be completed as ordered by WOC. Mepi on coccyx. R PIV running heparin @ 1800 units/hr. HepXa recheck scheduled for the AM. Regular diet, no straws. Incont urine, purewick good UOP. Incont of BM at times, LBM noted 2/13. K+ replacement protocol. Neph/heme/onc/cardiology consulted. SW following for discharge. Plan pending placement at TCU.

## 2023-02-16 ENCOUNTER — APPOINTMENT (OUTPATIENT)
Dept: OCCUPATIONAL THERAPY | Facility: CLINIC | Age: 63
DRG: 871 | End: 2023-02-16
Payer: COMMERCIAL

## 2023-02-16 ENCOUNTER — APPOINTMENT (OUTPATIENT)
Dept: PHYSICAL THERAPY | Facility: CLINIC | Age: 63
DRG: 871 | End: 2023-02-16
Payer: COMMERCIAL

## 2023-02-16 LAB
ANION GAP SERPL CALCULATED.3IONS-SCNC: 13 MMOL/L (ref 7–15)
BASOPHILS # BLD MANUAL: 0 10E3/UL (ref 0–0.2)
BASOPHILS # BLD MANUAL: 0.2 10E3/UL (ref 0–0.2)
BASOPHILS NFR BLD MANUAL: 0 %
BASOPHILS NFR BLD MANUAL: 1 %
BUN SERPL-MCNC: 30.1 MG/DL (ref 8–23)
CALCIUM SERPL-MCNC: 8.6 MG/DL (ref 8.8–10.2)
CHLORIDE SERPL-SCNC: 107 MMOL/L (ref 98–107)
CREAT SERPL-MCNC: 2.1 MG/DL (ref 0.51–0.95)
DEPRECATED HCO3 PLAS-SCNC: 24 MMOL/L (ref 22–29)
ELLIPTOCYTES BLD QL SMEAR: SLIGHT
EOSINOPHIL # BLD MANUAL: 0 10E3/UL (ref 0–0.7)
EOSINOPHIL # BLD MANUAL: 0.1 10E3/UL (ref 0–0.7)
EOSINOPHIL NFR BLD MANUAL: 0 %
EOSINOPHIL NFR BLD MANUAL: 15 %
ERYTHROCYTE [DISTWIDTH] IN BLOOD BY AUTOMATED COUNT: 15.6 % (ref 10–15)
GFR SERPL CREATININE-BSD FRML MDRD: 26 ML/MIN/1.73M2
GLUCOSE SERPL-MCNC: 85 MG/DL (ref 70–99)
HCT VFR BLD AUTO: 27 % (ref 35–47)
HGB BLD-MCNC: 8.1 G/DL (ref 11.7–15.7)
INR PPP: 2.35 (ref 0.85–1.15)
LYMPHOCYTES # BLD MANUAL: 0.4 10E3/UL (ref 0.8–5.3)
LYMPHOCYTES # BLD MANUAL: 2.3 10E3/UL (ref 0.8–5.3)
LYMPHOCYTES NFR BLD MANUAL: 10 %
LYMPHOCYTES NFR BLD MANUAL: 62 %
MCH RBC QN AUTO: 28.5 PG (ref 26.5–33)
MCHC RBC AUTO-ENTMCNC: 30 G/DL (ref 31.5–36.5)
MCV RBC AUTO: 95 FL (ref 78–100)
METAMYELOCYTES # BLD MANUAL: 2.3 10E3/UL
METAMYELOCYTES NFR BLD MANUAL: 10 %
MONOCYTES # BLD MANUAL: 0.1 10E3/UL (ref 0–1.3)
MONOCYTES # BLD MANUAL: 0.7 10E3/UL (ref 0–1.3)
MONOCYTES NFR BLD MANUAL: 10 %
MONOCYTES NFR BLD MANUAL: 3 %
MYELOCYTES # BLD MANUAL: 1.4 10E3/UL
MYELOCYTES NFR BLD MANUAL: 6 %
NEUTROPHILS # BLD MANUAL: 0 10E3/UL (ref 1.6–8.3)
NEUTROPHILS # BLD MANUAL: 16.2 10E3/UL (ref 1.6–8.3)
NEUTROPHILS NFR BLD MANUAL: 7 %
NEUTROPHILS NFR BLD MANUAL: 70 %
NRBC # BLD AUTO: 0 10E3/UL
NRBC # BLD AUTO: 1.2 10E3/UL
NRBC BLD MANUAL-RTO: 1 %
NRBC BLD MANUAL-RTO: 5 %
OTHER CELLS # BLD MANUAL: 0 10E3/UL
OTHER CELLS NFR BLD MANUAL: 6 %
PATH REV: ABNORMAL
PLAT MORPH BLD: ABNORMAL
PLAT MORPH BLD: ABNORMAL
PLATELET # BLD AUTO: 160 10E3/UL (ref 150–450)
POTASSIUM SERPL-SCNC: 3.7 MMOL/L (ref 3.4–5.3)
POTASSIUM SERPL-SCNC: 3.9 MMOL/L (ref 3.4–5.3)
RBC # BLD AUTO: 2.84 10E6/UL (ref 3.8–5.2)
RBC MORPH BLD: ABNORMAL
RBC MORPH BLD: ABNORMAL
SMUDGE CELLS BLD QL SMEAR: PRESENT
SODIUM SERPL-SCNC: 144 MMOL/L (ref 136–145)
UFH PPP CHRO-ACNC: <0.1 IU/ML
WBC # BLD AUTO: 23.2 10E3/UL (ref 4–11)

## 2023-02-16 PROCEDURE — 250N000013 HC RX MED GY IP 250 OP 250 PS 637: Performed by: INTERNAL MEDICINE

## 2023-02-16 PROCEDURE — 120N000001 HC R&B MED SURG/OB

## 2023-02-16 PROCEDURE — 85610 PROTHROMBIN TIME: CPT | Performed by: HOSPITALIST

## 2023-02-16 PROCEDURE — 97530 THERAPEUTIC ACTIVITIES: CPT | Mod: GP

## 2023-02-16 PROCEDURE — 85007 BL SMEAR W/DIFF WBC COUNT: CPT | Performed by: HOSPITALIST

## 2023-02-16 PROCEDURE — 99232 SBSQ HOSP IP/OBS MODERATE 35: CPT | Performed by: INTERNAL MEDICINE

## 2023-02-16 PROCEDURE — 85018 HEMOGLOBIN: CPT | Performed by: HOSPITALIST

## 2023-02-16 PROCEDURE — 85520 HEPARIN ASSAY: CPT | Performed by: INTERNAL MEDICINE

## 2023-02-16 PROCEDURE — 250N000013 HC RX MED GY IP 250 OP 250 PS 637: Performed by: NURSE PRACTITIONER

## 2023-02-16 PROCEDURE — 36415 COLL VENOUS BLD VENIPUNCTURE: CPT | Performed by: HOSPITALIST

## 2023-02-16 PROCEDURE — 84132 ASSAY OF SERUM POTASSIUM: CPT | Performed by: INTERNAL MEDICINE

## 2023-02-16 PROCEDURE — 97535 SELF CARE MNGMENT TRAINING: CPT | Mod: GO

## 2023-02-16 PROCEDURE — 250N000013 HC RX MED GY IP 250 OP 250 PS 637: Performed by: HOSPITALIST

## 2023-02-16 RX ORDER — WARFARIN SODIUM 5 MG/1
5 TABLET ORAL
Status: COMPLETED | OUTPATIENT
Start: 2023-02-16 | End: 2023-02-16

## 2023-02-16 RX ADMIN — FOLIC ACID 2 MG: 1 TABLET ORAL at 08:30

## 2023-02-16 RX ADMIN — ARIPIPRAZOLE 5 MG: 5 TABLET ORAL at 08:30

## 2023-02-16 RX ADMIN — GABAPENTIN 100 MG: 100 CAPSULE ORAL at 19:59

## 2023-02-16 RX ADMIN — NYSTATIN 500000 UNITS: 100000 SUSPENSION ORAL at 18:34

## 2023-02-16 RX ADMIN — WARFARIN SODIUM 5 MG: 5 TABLET ORAL at 18:34

## 2023-02-16 RX ADMIN — DULOXETINE HYDROCHLORIDE 120 MG: 60 CAPSULE, DELAYED RELEASE ORAL at 08:30

## 2023-02-16 RX ADMIN — NYSTATIN 500000 UNITS: 100000 SUSPENSION ORAL at 08:30

## 2023-02-16 RX ADMIN — MULTIPLE VITAMINS W/ MINERALS TAB 1 TABLET: TAB at 08:30

## 2023-02-16 RX ADMIN — PANTOPRAZOLE SODIUM 40 MG: 40 TABLET, DELAYED RELEASE ORAL at 05:52

## 2023-02-16 RX ADMIN — ATORVASTATIN CALCIUM 40 MG: 40 TABLET, FILM COATED ORAL at 19:59

## 2023-02-16 RX ADMIN — GABAPENTIN 100 MG: 100 CAPSULE ORAL at 13:19

## 2023-02-16 RX ADMIN — NYSTATIN 500000 UNITS: 100000 SUSPENSION ORAL at 22:50

## 2023-02-16 RX ADMIN — PANTOPRAZOLE SODIUM 40 MG: 40 TABLET, DELAYED RELEASE ORAL at 16:37

## 2023-02-16 RX ADMIN — GABAPENTIN 100 MG: 100 CAPSULE ORAL at 08:30

## 2023-02-16 RX ADMIN — MICONAZOLE NITRATE: 20 CREAM TOPICAL at 16:37

## 2023-02-16 RX ADMIN — NYSTATIN 500000 UNITS: 100000 SUSPENSION ORAL at 13:18

## 2023-02-16 RX ADMIN — MICONAZOLE NITRATE: 20 CREAM TOPICAL at 22:53

## 2023-02-16 RX ADMIN — Medication 1000 MCG: at 08:30

## 2023-02-16 ASSESSMENT — ACTIVITIES OF DAILY LIVING (ADL)
ADLS_ACUITY_SCORE: 48

## 2023-02-16 NOTE — PLAN OF CARE
Goal Outcome Evaluation:      Plan of Care Reviewed With: patient      Orientation:A/O x4,     Aggression stop light: Green    Vitals/Tele: VSS on RA, Denies pain    IV Access/drains: PIV SL    Diet: Regular, thin liquids, no straws    Mobility: Ax2 gb/w     GI/: Continent of  BM. purewick    Wound/Skin: right knee posterior, bilateral groin, Bilateral panus, bilateral breast, manage per plan of care    Consult: Cardiology    Discharge Plan: Pending

## 2023-02-16 NOTE — PLAN OF CARE
Goal Outcome Evaluation:      Plan of Care Reviewed With: patient    Overall Patient Progress: improvingOverall Patient Progress: improving     A/Ox4, VSS on RA. A2 GB/W, T/R q2hr. Ambulated with therapy, up in chair. Denied pain except with wound care which is less painful, no premed needed. Wounds under breasts, panus, R knee fold, and umbilicus done per POC. Heparin infusion, no rate change needed, Next HepXa scheduled for 2/16 AM. Regular diet tolerated well. Using purewick, good UOP. Last BM 2/13. SW following for discharge, pending TCU placement.

## 2023-02-16 NOTE — PROGRESS NOTES
[facility]  Hospitalist Progress Note    Date of Admission: 1/28/2023    Interval History   Patient seen later in the day.  She had been up in the chair most of the day.  Had done physical therapy and feeling like she is getting stronger.  Evaluation for TCU's  Assessment & Plan    Shira Rodriguez is a 62-year-old female with rheumatoid arthritis on methotrexate, prior massive PE on warfarin, morbid obesity who presented on 1/28/2023 with severe sepsis.  She was found unresponsive in her home. She  Was hypotensive and had temp of 100.2 in route to hospital.      Severe sepsis-present on admission  Candida intertrigo  Neutropenic fever, resolved, now with leukocytosis likely secondary to Neupogen  Lactic acidosis, Resolved     Patient admitted 1/28 with sepsis Lactate of 3.2.  and WBC 0.4. Severe sepsis on admission     Given fluids and responded with no need for ICU/pressor     DDX include panniculitis (significant redness under abd pannus and breasts) or mucositis (severe throat pain x 2 days) and duodenitis with severe neutropenia.    CT C/A/P showed small chronic left pleural effusion, small fluid around distal esophagus and duodenum    TTE 1/30 with possible vegetation; LETA 1/31 negative for vegetation or dissection    repeat CT chest 2/4 (due to cough) with slight increase in bilateral pleural effusions, mild increase in atelectasis      ID consulted with extensive infectious work-up: positive fungitell (significance unclear), positive EBV IgG (but negative IgM and DNA)    negative infectious work-up:  UA and culture, blood cultures, COVID/RSV/flu, Hep B&C, HIV, HSV, CMV, aspergillus, Blastomyces, histoplasmosis, anaplasma, Rickettsia, Ehrlichia     Patient completed 5 day course of doxycycline 2/3 (empiric tick born illness)     Completed 7 days course of fluconazole 2/6  (candida intertrigo)     Cefepime 1/30- 2/8 while neutropenic. >> stopped when counts recovered     Blood  cultures 2/7 NGTD X 2    1/29 + Beta D glucan fungitell     WBC 31,00 s/p neuopen 2/7 2/16 White count 23,000        Pancytopenia with severe leukopenia and neutropenia, moderate anemia and thrombocytopenia  Severe vitamin B12 deficiency and folate deficiency    CBC wnl 12/7/2022;     On admission 1/28 - Hb 9.4, WBC 0.4, ANC 0.1, platelets 59    Iron studies 1/29 indicating anemia of chronic disease.  Reticulocyte count suppressed.  B12 undetectable and folate low at 2.8     initiated on B12 and folate, ultimately started on leucovorin rescue 2/2-2/5 per Heme    methotrexate level 1/30 undetectable      Peripheral smear 1/29 showed moderate to marked pancytopenia. Negative for schistocytes and elevated fibrinogen argues against DIC. Negative for circulating blasts on scanning.     flow cytometry shows polytypic B cells, no aberrant immunophenotype on T cells and rare to absent CD34 positive blasts    elevated Kappa and lambda free light chains, monoclonal IgM immunoglobulin of lambda light chain type, IgM is elevated at 395 and IgG and IgA are within normal limits      immunological work-up showed C3 and C4 wnl, ANCA and ds-DNA negative\    s/p bone marrow biopsy on 2/3/2023 with no clear etiology (see report for details) but no current evidence for neoplasm or aplastic anemia - Heme suspects hypocellularity primarily due to methotrexate, B12 and folate deficiency     received 1U PRBC 1/31 and 2/4    received daily platelet transfusions 1/30-2/5 for plt <50K     repeat B12 level 2/8 >4K; will need ongoing outpatient monitoring and monthly B12 injections    received daily Neupogen 1/30-2/7; stopped with recovery of cell counts    Continue folate.     B12 supplement.     BM cytogenetic studies pending     per Heme; transfuse for plt <20K or signs of bleeding; hgb <7 g/dl    2/16 white count 23,000 hemoglobin 8.1 and platelets 160        KRISS on CKD stage 4, resolved  Mild anion gap metabolic acidosis, resolved       Normal renal function in 2021, Cr 1.7 5/2022, more recent baseline creatinine 2-2.2.   Creatinine on admission 3.25.    Nephrology consulted, suspect pre-renal with possible progression to ATN, treated with IVF with gradual improvement    Neph signed off 2/3.     Neph re-consulted 2/8 due to worsening renal function of unclear etiology, no improvement with fluid bolus    Urine eosinophil negative    Creatinine 2.1 all on 2/15    Probable esophagitis  Probable duodenitis  Dysphagia   Mucositis     she has been complaining of throat pain; oral ulcers presents on exam      CT on admission showed small amount of fluid adjacent to distal thoracic esophagus and scattered posterior mediastinal lymph nodes along esophagus of uncertain etiology but could be secondary to esophagitis or other inflammatory process.  CT scan also showed small edema around second and third portion of duodenum.     SLP following, has advanced to reg diet     continue Protonix    odynophagia has resolved and mouth pain improving     Panniculitis, improving  Candidal rash-under pannus, breasts, improving  * treated with fluconazole 1/30-2/6 per ID  - Wound care on board , patient getting wound care and received  - continue miconazole cream     Hypokalemia  -Being replaced     Type II MI due to severe sepsis     High-sensitivity troponin mildly elevated at 48 and repeat was normal and can be due to demand     TTE 1/30 with normal LV function without WMA, RV with mildly decreased systolic function     History of massive PE causing PEA arrest, 9/2019    last dose of warfarin was 1/29, but INR has remained therapeutic through 2/5.     as above, significant discrepancy between GFR calculation with cystatin c vs Cr; and so was started on heparin gtt bridge to coumadin, INR today 1.65    pharmacy managing Coumadin dose, continue heparin until therapeutic INR    INR therapeutic stopped heparin 2/15    INR 2.35     Rheumatoid arthritis   Historically on  "methotrexate, resumed about 2 weeks prior to admission due to worsening back and shoulder pain.     Previous provider had sent inbox message to outpatient rheumatologist, Dr. Austin at [facility], on 2/8 and he plans for clinic to contact her to schedule f/u appt within 1 month    -Hold methotrexate, follow up with outpatient rheumatologist     COPD  chronic cough  * has chronic cough for years and follows with pulmonary at [facility]  * CXR 2/3 showed mild opacity at the left lung base is new since the previous exam - atelectasis vs pneumonia.   * CT chest 2/4 with findings as above   - no signs of acute exacerbation     Chronic major depression  Chronic pain  * see recent pain clinic visit summary from 1/17/2023 for background information  - Continue Abilify, Cymbalta and Neurontin     Essential hypertension  PTA lisinopril initially held due to KRISS and hypotension, which have resolved.  - remains normotensive at this time, resume as indicated     Morbid obesity, BMI 46  Estimated body mass index is 45.9 kg/m  as calculated from the following:    Height as of this encounter: 1.676 m (5' 6\").    Weight as of this encounter: 129 kg (284 lb 6.3 oz).     Acute metabolic encephalopathy secondary to severe sepsis, resolved  Admission head CT negative for acute pathology.  Returned to baseline mental status with treatment of sepsis.      Mild hypovolemic hyponatremia, resolved  Admission sodium 132, normalized with IVF.     Sodium 142      Hypomagnesemia, resolved  * replaced per protocol     3 cm right adrenal nodule  * Noted on CT.  Was present on previous imaging as well.  Likely benign  * A.m. cortisol 1/29, normal at 43.9     Hypoalbuminemia, albumin 2.5     Hyperuricemia  * uric acid 8.9 on 1/29/23      Disposition:     Anticipate TCU     currently working on disposition placement.    TCU placement when available.  Endurance is slightly improving.    Up in the chair for much of the day.  Able to get to the " bathroom with assistance      Diet: Combination Diet Regular Diet; Thin Liquids (level 0) (no straws)  Snacks/Supplements Adult: Other; D: magic cup (RD); With Meals  Underwood Catheter: Not present  DVT Prophylaxis: back on coumadin   Code Status: Full Code       Expected Discharge Date: 02/16/2023    Discharge Delays: Placement - TCU           KELVIN LEDEZMA MD,   Hospitalist Service  [facility] [place]  ______________________________________________________________________    -Data reviewed today: I reviewed all new labs and imaging results over the last 24 hours. I personally reviewed no images or EKG's today.    Physical Exam   Temp: 97.4  F (36.3  C) Temp src: Oral BP: 135/80 Pulse: 87   Resp: 16 SpO2: 96 % O2 Device: None (Room air)    Vitals:    02/13/23 1529 02/14/23 0711 02/15/23 0620   Weight: 112.9 kg (249 lb) 112.9 kg (249 lb) 112.9 kg (249 lb)   Constitutional: Appears stated age, no acute distress.  Respiratory: Breath sounds CTA. No increased work of breathing.  Cardiovascular: RRR, no rub or murmur.  GI: Soft, non-tender, non-distended.obesity   Skin: Warm, dry, no rashes or lesions.  Other: Rash in posterior knee with erythema with plaque   Periwick in place     Medications     heparin 1,800 Units/hr (02/15/23 1019)       ARIPiprazole  5 mg Oral Daily     atorvastatin  40 mg Oral QPM     cyanocobalamin  1,000 mcg Sublingual Daily     DULoxetine  120 mg Oral Daily     folic acid  2 mg Oral Daily     gabapentin  100 mg Oral TID     miconazole   Topical BID     multivitamin w/minerals  1 tablet Oral Daily     nystatin  500,000 Units Swish & Spit 4x Daily     pantoprazole  40 mg Oral BID AC     sodium chloride (PF)  3 mL Intracatheter Q8H     warfarin ANTICOAGULANT  7.5 mg Oral ONCE at 18:00     Warfarin Therapy Reminder  1 each Oral See Admin Instructions       Data   Recent Labs   Lab 02/15/23  0745 02/14/23  0714 02/13/23  0826 02/12/23  0731   WBC 28.3* 31.9* 35.5* 33.9*   HGB 8.0*  7.9* 8.0* 8.3*   MCV 93 94 92 93    160 132* 137*   INR 2.02* 1.65* 1.42* 1.33*   NA  --  142 145 142   POTASSIUM 3.8 3.8 4.0 4.0   CHLORIDE  --  107 110* 110*   CO2  --  26 26 25   BUN  --  31.0* 29.1* 33.7*   CR  --  2.30* 2.26* 2.33*   ANIONGAP  --  9 9 7   MARIA INES  --  8.4* 8.3* 8.2*   GLC  --  90 88 101*   ALBUMIN  --   --  2.3* 2.3*   PROTTOTAL  --   --  5.6* 5.4*   BILITOTAL  --   --  0.2 <0.2   ALKPHOS  --   --  82 83   ALT  --   --  21 20   AST  --   --  41* 43*       Imaging:  No results found for this or any previous visit (from the past 24 hour(s)).

## 2023-02-17 ENCOUNTER — APPOINTMENT (OUTPATIENT)
Dept: PHYSICAL THERAPY | Facility: CLINIC | Age: 63
DRG: 871 | End: 2023-02-17
Payer: COMMERCIAL

## 2023-02-17 ENCOUNTER — APPOINTMENT (OUTPATIENT)
Dept: OCCUPATIONAL THERAPY | Facility: CLINIC | Age: 63
DRG: 871 | End: 2023-02-17
Payer: COMMERCIAL

## 2023-02-17 LAB
ATRIAL RATE - MUSE: 86 BPM
BASOPHILS # BLD MANUAL: 0 10E3/UL (ref 0–0.2)
BASOPHILS NFR BLD MANUAL: 0 %
DIASTOLIC BLOOD PRESSURE - MUSE: NORMAL MMHG
EOSINOPHIL # BLD MANUAL: 0 10E3/UL (ref 0–0.7)
EOSINOPHIL NFR BLD MANUAL: 0 %
ERYTHROCYTE [DISTWIDTH] IN BLOOD BY AUTOMATED COUNT: 16.1 % (ref 10–15)
HCT VFR BLD AUTO: 27.3 % (ref 35–47)
HGB BLD-MCNC: 8.2 G/DL (ref 11.7–15.7)
INR PPP: 2.79 (ref 0.85–1.15)
INTERPRETATION ECG - MUSE: NORMAL
LYMPHOCYTES # BLD MANUAL: 1.4 10E3/UL (ref 0.8–5.3)
LYMPHOCYTES NFR BLD MANUAL: 6 %
MCH RBC QN AUTO: 28.8 PG (ref 26.5–33)
MCHC RBC AUTO-ENTMCNC: 30 G/DL (ref 31.5–36.5)
MCV RBC AUTO: 96 FL (ref 78–100)
METAMYELOCYTES # BLD MANUAL: 1.6 10E3/UL
METAMYELOCYTES NFR BLD MANUAL: 7 %
MONOCYTES # BLD MANUAL: 0.2 10E3/UL (ref 0–1.3)
MONOCYTES NFR BLD MANUAL: 1 %
MYELOCYTES # BLD MANUAL: 1.1 10E3/UL
MYELOCYTES NFR BLD MANUAL: 5 %
NEUTROPHILS # BLD MANUAL: 18.3 10E3/UL (ref 1.6–8.3)
NEUTROPHILS NFR BLD MANUAL: 81 %
NRBC # BLD AUTO: 0.2 10E3/UL
NRBC BLD MANUAL-RTO: 1 %
P AXIS - MUSE: 74 DEGREES
PLAT MORPH BLD: ABNORMAL
PLATELET # BLD AUTO: 177 10E3/UL (ref 150–450)
POTASSIUM SERPL-SCNC: 3.6 MMOL/L (ref 3.4–5.3)
PR INTERVAL - MUSE: 194 MS
QRS DURATION - MUSE: 94 MS
QT - MUSE: 400 MS
QTC - MUSE: 478 MS
R AXIS - MUSE: 59 DEGREES
RBC # BLD AUTO: 2.85 10E6/UL (ref 3.8–5.2)
RBC MORPH BLD: ABNORMAL
SYSTOLIC BLOOD PRESSURE - MUSE: NORMAL MMHG
T AXIS - MUSE: 44 DEGREES
VENTRICULAR RATE- MUSE: 86 BPM
WBC # BLD AUTO: 22.6 10E3/UL (ref 4–11)

## 2023-02-17 PROCEDURE — 97530 THERAPEUTIC ACTIVITIES: CPT | Mod: GP

## 2023-02-17 PROCEDURE — 250N000013 HC RX MED GY IP 250 OP 250 PS 637: Performed by: INTERNAL MEDICINE

## 2023-02-17 PROCEDURE — 97116 GAIT TRAINING THERAPY: CPT | Mod: GP

## 2023-02-17 PROCEDURE — 250N000013 HC RX MED GY IP 250 OP 250 PS 637: Performed by: NURSE PRACTITIONER

## 2023-02-17 PROCEDURE — 99232 SBSQ HOSP IP/OBS MODERATE 35: CPT | Performed by: INTERNAL MEDICINE

## 2023-02-17 PROCEDURE — 85041 AUTOMATED RBC COUNT: CPT | Performed by: HOSPITALIST

## 2023-02-17 PROCEDURE — 97535 SELF CARE MNGMENT TRAINING: CPT | Mod: GO

## 2023-02-17 PROCEDURE — 120N000001 HC R&B MED SURG/OB

## 2023-02-17 PROCEDURE — 36415 COLL VENOUS BLD VENIPUNCTURE: CPT | Performed by: INTERNAL MEDICINE

## 2023-02-17 PROCEDURE — 84132 ASSAY OF SERUM POTASSIUM: CPT | Performed by: INTERNAL MEDICINE

## 2023-02-17 PROCEDURE — 85610 PROTHROMBIN TIME: CPT | Performed by: HOSPITALIST

## 2023-02-17 PROCEDURE — 85007 BL SMEAR W/DIFF WBC COUNT: CPT | Performed by: HOSPITALIST

## 2023-02-17 PROCEDURE — 85018 HEMOGLOBIN: CPT | Performed by: HOSPITALIST

## 2023-02-17 PROCEDURE — 250N000013 HC RX MED GY IP 250 OP 250 PS 637: Performed by: HOSPITALIST

## 2023-02-17 RX ORDER — WARFARIN SODIUM 2.5 MG/1
2.5 TABLET ORAL
Status: COMPLETED | OUTPATIENT
Start: 2023-02-17 | End: 2023-02-17

## 2023-02-17 RX ADMIN — MICONAZOLE NITRATE: 20 CREAM TOPICAL at 15:08

## 2023-02-17 RX ADMIN — DULOXETINE HYDROCHLORIDE 120 MG: 60 CAPSULE, DELAYED RELEASE ORAL at 09:45

## 2023-02-17 RX ADMIN — PANTOPRAZOLE SODIUM 40 MG: 40 TABLET, DELAYED RELEASE ORAL at 06:56

## 2023-02-17 RX ADMIN — WARFARIN SODIUM 2.5 MG: 2.5 TABLET ORAL at 17:32

## 2023-02-17 RX ADMIN — ATORVASTATIN CALCIUM 40 MG: 40 TABLET, FILM COATED ORAL at 20:37

## 2023-02-17 RX ADMIN — NYSTATIN 500000 UNITS: 100000 SUSPENSION ORAL at 17:32

## 2023-02-17 RX ADMIN — MICONAZOLE NITRATE: 20 CREAM TOPICAL at 22:37

## 2023-02-17 RX ADMIN — NYSTATIN 500000 UNITS: 100000 SUSPENSION ORAL at 22:37

## 2023-02-17 RX ADMIN — NYSTATIN 500000 UNITS: 100000 SUSPENSION ORAL at 09:46

## 2023-02-17 RX ADMIN — ARIPIPRAZOLE 5 MG: 5 TABLET ORAL at 09:46

## 2023-02-17 RX ADMIN — GABAPENTIN 100 MG: 100 CAPSULE ORAL at 09:46

## 2023-02-17 RX ADMIN — PANTOPRAZOLE SODIUM 40 MG: 40 TABLET, DELAYED RELEASE ORAL at 17:32

## 2023-02-17 RX ADMIN — GABAPENTIN 100 MG: 100 CAPSULE ORAL at 20:37

## 2023-02-17 RX ADMIN — Medication 1000 MCG: at 09:45

## 2023-02-17 RX ADMIN — MULTIPLE VITAMINS W/ MINERALS TAB 1 TABLET: TAB at 09:45

## 2023-02-17 RX ADMIN — GABAPENTIN 100 MG: 100 CAPSULE ORAL at 13:34

## 2023-02-17 RX ADMIN — FOLIC ACID 2 MG: 1 TABLET ORAL at 09:46

## 2023-02-17 RX ADMIN — NYSTATIN 500000 UNITS: 100000 SUSPENSION ORAL at 13:34

## 2023-02-17 ASSESSMENT — ACTIVITIES OF DAILY LIVING (ADL)
ADLS_ACUITY_SCORE: 52
ADLS_ACUITY_SCORE: 48
ADLS_ACUITY_SCORE: 52
ADLS_ACUITY_SCORE: 48
ADLS_ACUITY_SCORE: 52
ADLS_ACUITY_SCORE: 48
ADLS_ACUITY_SCORE: 52

## 2023-02-17 NOTE — PLAN OF CARE
1900 - 2101    A&Ox4. VSS on RA. Denies pain, N/V. Tolerating regular diet. A2 w/GB+W. T/R q 2 hrs. Incontinent of B/B, uses purewick overnight, good UOP, no BM overnight. Mepilex to coccyx - CDI. Wounds to breasts, pannus, R knee, and umbilicus. R PIV SL. WOC and SW following. Discharge pending TCU placement.

## 2023-02-17 NOTE — PROGRESS NOTES
[facility]  Hospitalist Progress Note    Date of Admission: 1/28/2023    Interval History   Patient seen later in the day.  She had been up in the chair most of the day.  Had done physical therapy and feeling like she is getting stronger.  Evaluation for TCU's  Assessment & Plan    Shira Rodriguez is a 62-year-old female with rheumatoid arthritis on methotrexate, prior massive PE on warfarin, morbid obesity who presented on 1/28/2023 with severe sepsis.  She was found unresponsive in her home. She  Was hypotensive and had temp of 100.2 in route to hospital.      Severe sepsis-present on admission  Candida intertrigo  Neutropenic fever, resolved, now with leukocytosis likely secondary to Neupogen  Lactic acidosis, Resolved     Patient admitted 1/28 with sepsis Lactate of 3.2.  and WBC 0.4. Severe sepsis on admission     Given fluids and responded with no need for ICU/pressor     DDX include panniculitis (significant redness under abd pannus and breasts) or mucositis (severe throat pain x 2 days) and duodenitis with severe neutropenia.    CT C/A/P showed small chronic left pleural effusion, small fluid around distal esophagus and duodenum    TTE 1/30 with possible vegetation; LETA 1/31 negative for vegetation or dissection    repeat CT chest 2/4 (due to cough) with slight increase in bilateral pleural effusions, mild increase in atelectasis      ID consulted with extensive infectious work-up: positive fungitell (significance unclear), positive EBV IgG (but negative IgM and DNA)    negative infectious work-up:  UA and culture, blood cultures, COVID/RSV/flu, Hep B&C, HIV, HSV, CMV, aspergillus, Blastomyces, histoplasmosis, anaplasma, Rickettsia, Ehrlichia     Patient completed 5 day course of doxycycline 2/3 (empiric tick born illness)     Completed 7 days course of fluconazole 2/6  (candida intertrigo)     Cefepime 1/30- 2/8 while neutropenic. >> stopped when counts recovered     Blood  cultures 2/7 NGTD X 2    1/29 + Beta D glucan fungitell     WBC 31,00 s/p neuopen 2/7 2/16 White count 23,000        Pancytopenia with severe leukopenia and neutropenia, moderate anemia and thrombocytopenia  Severe vitamin B12 deficiency and folate deficiency    CBC wnl 12/7/2022;     On admission 1/28 - Hb 9.4, WBC 0.4, ANC 0.1, platelets 59    Iron studies 1/29 indicating anemia of chronic disease.  Reticulocyte count suppressed.  B12 undetectable and folate low at 2.8     initiated on B12 and folate, ultimately started on leucovorin rescue 2/2-2/5 per Heme    methotrexate level 1/30 undetectable      Peripheral smear 1/29 showed moderate to marked pancytopenia. Negative for schistocytes and elevated fibrinogen argues against DIC. Negative for circulating blasts on scanning.     flow cytometry shows polytypic B cells, no aberrant immunophenotype on T cells and rare to absent CD34 positive blasts    elevated Kappa and lambda free light chains, monoclonal IgM immunoglobulin of lambda light chain type, IgM is elevated at 395 and IgG and IgA are within normal limits      immunological work-up showed C3 and C4 wnl, ANCA and ds-DNA negative\    s/p bone marrow biopsy on 2/3/2023 with no clear etiology (see report for details) but no current evidence for neoplasm or aplastic anemia - Heme suspects hypocellularity primarily due to methotrexate, B12 and folate deficiency     received 1U PRBC 1/31 and 2/4    received daily platelet transfusions 1/30-2/5 for plt <50K     repeat B12 level 2/8 >4K; will need ongoing outpatient monitoring and monthly B12 injections    received daily Neupogen 1/30-2/7; stopped with recovery of cell counts    Continue folate.     B12 supplement.     BM cytogenetic studies pending     per Heme; transfuse for plt <20K or signs of bleeding; hgb <7 g/dl    2/16 white count 23,000 hemoglobin 8.1 and platelets 160        KRISS on CKD stage 4, resolved  Mild anion gap metabolic acidosis, resolved       Normal renal function in 2021, Cr 1.7 5/2022, more recent baseline creatinine 2-2.2.   Creatinine on admission 3.25.    Nephrology consulted, suspect pre-renal with possible progression to ATN, treated with IVF with gradual improvement    Neph signed off 2/3.     Neph re-consulted 2/8 due to worsening renal function of unclear etiology, no improvement with fluid bolus    Urine eosinophil negative    Creatinine 2.1 all on 2/15    Probable esophagitis  Probable duodenitis  Dysphagia   Mucositis     she has been complaining of throat pain; oral ulcers presents on exam      CT on admission showed small amount of fluid adjacent to distal thoracic esophagus and scattered posterior mediastinal lymph nodes along esophagus of uncertain etiology but could be secondary to esophagitis or other inflammatory process.  CT scan also showed small edema around second and third portion of duodenum.     SLP following, has advanced to reg diet     continue Protonix    odynophagia has resolved and mouth pain improving     Panniculitis, improving  Candidal rash-under pannus, breasts, improving  * treated with fluconazole 1/30-2/6 per ID  - Wound care on board , patient getting wound care and received  - continue miconazole cream     Hypokalemia  -Being replaced     Type II MI due to severe sepsis     High-sensitivity troponin mildly elevated at 48 and repeat was normal and can be due to demand     TTE 1/30 with normal LV function without WMA, RV with mildly decreased systolic function     History of massive PE causing PEA arrest, 9/2019    last dose of warfarin was 1/29, but INR has remained therapeutic through 2/5.     as above, significant discrepancy between GFR calculation with cystatin c vs Cr; and so was started on heparin gtt bridge to coumadin, INR today 1.65    pharmacy managing Coumadin dose, continue heparin until therapeutic INR    INR therapeutic stopped heparin 2/15    INR 2.35     Rheumatoid arthritis   Historically on  "methotrexate, resumed about 2 weeks prior to admission due to worsening back and shoulder pain.     Previous provider had sent inbox message to outpatient rheumatologist, Dr. Austin at [facility], on 2/8 and he plans for clinic to contact her to schedule f/u appt within 1 month    -Hold methotrexate, follow up with outpatient rheumatologist     COPD  chronic cough  * has chronic cough for years and follows with pulmonary at [facility]  * CXR 2/3 showed mild opacity at the left lung base is new since the previous exam - atelectasis vs pneumonia.   * CT chest 2/4 with findings as above   - no signs of acute exacerbation     Chronic major depression  Chronic pain  * see recent pain clinic visit summary from 1/17/2023 for background information  - Continue Abilify, Cymbalta and Neurontin     Essential hypertension  PTA lisinopril initially held due to KRISS and hypotension, which have resolved.  - remains normotensive at this time, resume as indicated     Morbid obesity, BMI 46  Estimated body mass index is 45.9 kg/m  as calculated from the following:    Height as of this encounter: 1.676 m (5' 6\").    Weight as of this encounter: 129 kg (284 lb 6.3 oz).     Acute metabolic encephalopathy secondary to severe sepsis, resolved  Admission head CT negative for acute pathology.  Returned to baseline mental status with treatment of sepsis.      Mild hypovolemic hyponatremia, resolved  Admission sodium 132, normalized with IVF.     Sodium 142      Hypomagnesemia, resolved  * replaced per protocol     3 cm right adrenal nodule  * Noted on CT.  Was present on previous imaging as well.  Likely benign  * A.m. cortisol 1/29, normal at 43.9     Hypoalbuminemia, albumin 2.5     Hyperuricemia  * uric acid 8.9 on 1/29/23      Disposition:     Anticipate TCU     currently working on disposition placement.    TCU placement when available.  Endurance is slightly improving.    Up in the chair for much of the day.  Able to get to the " bathroom with assistance      Diet: Combination Diet Regular Diet; Thin Liquids (level 0) (no straws)  Snacks/Supplements Adult: Other; D: magic cup (RD); With Meals  Underwood Catheter: Not present  DVT Prophylaxis: back on coumadin   Code Status: Full Code       Expected Discharge Date: 02/16/2023    Discharge Delays: Placement - TCU           KELVIN LEDEZMA MD,   Hospitalist Service  [facility]  ______________________________________________________________________    -Data reviewed today: I reviewed all new labs and imaging results over the last 24 hours. I personally reviewed no images or EKG's today.    Physical Exam   Temp: 98.2  F (36.8  C) Temp src: Oral BP: 110/64 Pulse: 94   Resp: 16 SpO2: 95 % O2 Device: None (Room air)    Vitals:    02/13/23 1529 02/14/23 0711 02/15/23 0620   Weight: 112.9 kg (249 lb) 112.9 kg (249 lb) 112.9 kg (249 lb)   Constitutional: Appears stated age, no acute distress.  Respiratory: Breath sounds CTA. No increased work of breathing.  Cardiovascular: RRR, no rub or murmur.  GI: Soft, non-tender, non-distended.obesity   Skin: Warm, dry, no rashes or lesions.  Other: Rash in posterior knee with erythema with plaque   Periwick in place     Medications       ARIPiprazole  5 mg Oral Daily     atorvastatin  40 mg Oral QPM     cyanocobalamin  1,000 mcg Sublingual Daily     DULoxetine  120 mg Oral Daily     folic acid  2 mg Oral Daily     gabapentin  100 mg Oral TID     miconazole   Topical BID     multivitamin w/minerals  1 tablet Oral Daily     nystatin  500,000 Units Swish & Spit 4x Daily     pantoprazole  40 mg Oral BID AC     sodium chloride (PF)  3 mL Intracatheter Q8H     Warfarin Therapy Reminder  1 each Oral See Admin Instructions       Data   Recent Labs   Lab 02/16/23  0758 02/15/23  0745 02/14/23  0714 02/13/23  0826 02/12/23  0731   WBC 23.2* 28.3* 31.9* 35.5* 33.9*   HGB 8.1* 8.0* 7.9* 8.0* 8.3*   MCV 95 93 94 92 93    182 160 132* 137*   INR  2.35* 2.02* 1.65* 1.42* 1.33*   NA  --  144 142 145 142   POTASSIUM 3.7 3.9  3.8 3.8 4.0 4.0   CHLORIDE  --  107 107 110* 110*   CO2  --  24 26 26 25   BUN  --  30.1* 31.0* 29.1* 33.7*   CR  --  2.10* 2.30* 2.26* 2.33*   ANIONGAP  --  13 9 9 7   MARIA INES  --  8.6* 8.4* 8.3* 8.2*   GLC  --  85 90 88 101*   ALBUMIN  --   --   --  2.3* 2.3*   PROTTOTAL  --   --   --  5.6* 5.4*   BILITOTAL  --   --   --  0.2 <0.2   ALKPHOS  --   --   --  82 83   ALT  --   --   --  21 20   AST  --   --   --  41* 43*       Imaging:  No results found for this or any previous visit (from the past 24 hour(s)).

## 2023-02-17 NOTE — PROGRESS NOTES
Expand All Collapse All                                                                                                                                                                                                                                              [facility]  Hospitalist Progress Note     Date of Admission: 1/28/2023        Interval History     Patient seen later in the day.  She had been up in the chair most of the day.  Had done physical therapy and feeling like she is getting stronger.  Evaluation for TCU's        Assessment & Plan      Shira Rodriguez is a 62-year-old female with rheumatoid arthritis on methotrexate, prior massive PE on warfarin, morbid obesity who presented on 1/28/2023 with severe sepsis.  She was found unresponsive in her home. She  Was hypotensive and had temp of 100.2 in route to hospital.      Severe sepsis-present on admission  Candida intertrigo  Neutropenic fever, resolved, now with leukocytosis likely secondary to Neupogen  Lactic acidosis, Resolved     Patient admitted 1/28 with sepsis Lactate of 3.2.  and WBC 0.4. Severe sepsis on admission     Given fluids and responded with no need for ICU/pressor     DDX include panniculitis (significant redness under abd pannus and breasts) or mucositis (severe throat pain x 2 days) and duodenitis with severe neutropenia.    CT C/A/P showed small chronic left pleural effusion, small fluid around distal esophagus and duodenum    TTE 1/30 with possible vegetation; LETA 1/31 negative for vegetation or dissection    repeat CT chest 2/4 (due to cough) with slight increase in bilateral pleural effusions, mild increase in atelectasis      ID consulted with extensive infectious work-up: positive fungitell (significance unclear), positive EBV IgG (but negative IgM and DNA)    negative infectious work-up:  UA and culture, blood cultures, COVID/RSV/flu, Hep B&C, HIV, HSV, CMV, aspergillus, Blastomyces,  histoplasmosis, anaplasma, Rickettsia, Ehrlichia     Patient completed 5 day course of doxycycline 2/3 (empiric tick born illness)     Completed 7 days course of fluconazole 2/6  (candida intertrigo)     Cefepime 1/30- 2/8 while neutropenic. >> stopped when counts recovered     Blood cultures 2/7 NGTD X 2    1/29 + Beta D glucan fungitell     WBC 31,00 s/p neuopen 2/7 2/16 White count 23,000        Pancytopenia with severe leukopenia and neutropenia, moderate anemia and thrombocytopenia  Severe vitamin B12 deficiency and folate deficiency    CBC wnl 12/7/2022;     On admission 1/28 - Hb 9.4, WBC 0.4, ANC 0.1, platelets 59    Iron studies 1/29 indicating anemia of chronic disease.  Reticulocyte count suppressed.  B12 undetectable and folate low at 2.8     initiated on B12 and folate, ultimately started on leucovorin rescue 2/2-2/5 per Heme    methotrexate level 1/30 undetectable      Peripheral smear 1/29 showed moderate to marked pancytopenia. Negative for schistocytes and elevated fibrinogen argues against DIC. Negative for circulating blasts on scanning.     flow cytometry shows polytypic B cells, no aberrant immunophenotype on T cells and rare to absent CD34 positive blasts    elevated Kappa and lambda free light chains, monoclonal IgM immunoglobulin of lambda light chain type, IgM is elevated at 395 and IgG and IgA are within normal limits      immunological work-up showed C3 and C4 wnl, ANCA and ds-DNA negative\    s/p bone marrow biopsy on 2/3/2023 with no clear etiology (see report for details) but no current evidence for neoplasm or aplastic anemia - Heme suspects hypocellularity primarily due to methotrexate, B12 and folate deficiency     received 1U PRBC 1/31 and 2/4    received daily platelet transfusions 1/30-2/5 for plt <50K     repeat B12 level 2/8 >4K; will need ongoing outpatient monitoring and monthly B12 injections    received daily Neupogen 1/30-2/7; stopped with recovery of cell  counts    Continue folate.     B12 supplement.     BM cytogenetic studies pending     per Heme; transfuse for plt <20K or signs of bleeding; hgb <7 g/dl    2/16 white count 23,000 hemoglobin 8.1 and platelets 160        KRISS on CKD stage 4, resolved  Mild anion gap metabolic acidosis, resolved      Normal renal function in 2021, Cr 1.7 5/2022, more recent baseline creatinine 2-2.2.   Creatinine on admission 3.25.    Nephrology consulted, suspect pre-renal with possible progression to ATN, treated with IVF with gradual improvement    Neph signed off 2/3.     Neph re-consulted 2/8 due to worsening renal function of unclear etiology, no improvement with fluid bolus    Urine eosinophil negative    Creatinine 2.1 all on 2/15     Probable esophagitis  Probable duodenitis  Dysphagia   Mucositis     she has been complaining of throat pain; oral ulcers presents on exam      CT on admission showed small amount of fluid adjacent to distal thoracic esophagus and scattered posterior mediastinal lymph nodes along esophagus of uncertain etiology but could be secondary to esophagitis or other inflammatory process.  CT scan also showed small edema around second and third portion of duodenum.     SLP following, has advanced to reg diet     continue Protonix    odynophagia has resolved and mouth pain improving     Panniculitis, improving  Candidal rash-under pannus, breasts, improving  * treated with fluconazole 1/30-2/6 per ID  - Wound care on board , patient getting wound care and received  - continue miconazole cream     Hypokalemia  -Being replaced     Type II MI due to severe sepsis     High-sensitivity troponin mildly elevated at 48 and repeat was normal and can be due to demand     TTE 1/30 with normal LV function without WMA, RV with mildly decreased systolic function     History of massive PE causing PEA arrest, 9/2019    last dose of warfarin was 1/29, but INR has remained therapeutic through 2/5.     as above, significant  "discrepancy between GFR calculation with cystatin c vs Cr; and so was started on heparin gtt bridge to coumadin, INR today 1.65    pharmacy managing Coumadin dose, continue heparin until therapeutic INR    INR therapeutic stopped heparin 2/15    INR 2.35     Rheumatoid arthritis   Historically on methotrexate, resumed about 2 weeks prior to admission due to worsening back and shoulder pain.     Previous provider had sent inbox message to outpatient rheumatologist, Dr. Austin at [facility], on 2/8 and he plans for clinic to contact her to schedule f/u appt within 1 month    -Hold methotrexate, follow up with outpatient rheumatologist      COPD  chronic cough  * has chronic cough for years and follows with pulmonary at [facility]  * CXR 2/3 showed mild opacity at the left lung base is new since the previous exam - atelectasis vs pneumonia.   * CT chest 2/4 with findings as above   - no signs of acute exacerbation     Chronic major depression  Chronic pain  * see recent pain clinic visit summary from 1/17/2023 for background information  - Continue Abilify, Cymbalta and Neurontin     Essential hypertension  PTA lisinopril initially held due to KRISS and hypotension, which have resolved.  - remains normotensive at this time, resume as indicated     Morbid obesity, BMI 46  Estimated body mass index is 45.9 kg/m  as calculated from the following:    Height as of this encounter: 1.676 m (5' 6\").    Weight as of this encounter: 129 kg (284 lb 6.3 oz).     Acute metabolic encephalopathy secondary to severe sepsis, resolved  Admission head CT negative for acute pathology.  Returned to baseline mental status with treatment of sepsis.      Mild hypovolemic hyponatremia, resolved  Admission sodium 132, normalized with IVF.     Sodium 142      Hypomagnesemia, resolved  * replaced per protocol     3 cm right adrenal nodule  * Noted on CT.  Was present on previous imaging as well.  Likely benign  * A.m. cortisol 1/29, normal at " 43.9     Hypoalbuminemia, albumin 2.5     Hyperuricemia  * uric acid 8.9 on 1/29/23      Disposition:     Anticipate TCU     currently working on disposition placement.    TCU placement when available.  Endurance is slightly improving.    Up in the chair for much of the day.  Able to get to the bathroom with assistance        Diet: Combination Diet Regular Diet; Thin Liquids (level 0) (no straws)  Snacks/Supplements Adult: Other; D: magic cup (RD); With Meals  Underwood Catheter: Not present  DVT Prophylaxis: back on coumadin   Code Status: Full Code        Expected Discharge Date: 02/16/2023    Discharge Delays: Placement - TCU            KELVIN LEDEZMA MD,   Hospitalist Service  [facility]  ______________________________________________________________________     -Data reviewed today: I reviewed all new labs and imaging results over the last 24 hours. I personally reviewed no images or EKG's today.           Physical Exam     Temp: 97.4  F (36.3  C) Temp src: Oral BP: 135/80 Pulse: 87   Resp: 16 SpO2: 96 % O2 Device: None (Room air)          Vitals:     02/13/23 1529 02/14/23 0711 02/15/23 0620   Weight: 112.9 kg (249 lb) 112.9 kg (249 lb) 112.9 kg (249 lb)   Constitutional: Appears stated age, no acute distress.  Respiratory: Breath sounds CTA. No increased work of breathing.  Cardiovascular: RRR, no rub or murmur.  GI: Soft, non-tender, non-distended.obesity   Skin: Warm, dry, no rashes or lesions.  Other:   Rash in posterior knee with erythema with plaque   Periwick in place            Medications        heparin 1,800 Units/hr (02/15/23 1019)        ARIPiprazole  5 mg Oral Daily     atorvastatin  40 mg Oral QPM     cyanocobalamin  1,000 mcg Sublingual Daily     DULoxetine  120 mg Oral Daily     folic acid  2 mg Oral Daily     gabapentin  100 mg Oral TID     miconazole   Topical BID     multivitamin w/minerals  1 tablet Oral Daily     nystatin  500,000 Units Swish & Spit 4x  Daily     pantoprazole  40 mg Oral BID AC     sodium chloride (PF)  3 mL Intracatheter Q8H     warfarin ANTICOAGULANT  7.5 mg Oral ONCE at 18:00     Warfarin Therapy Reminder  1 each Oral See Admin Instructions               Data             Recent Labs   Lab 02/15/23  0745 02/14/23  0714 02/13/23  0826 02/12/23  0731   WBC 28.3* 31.9* 35.5* 33.9*   HGB 8.0* 7.9* 8.0* 8.3*   MCV 93 94 92 93    160 132* 137*   INR 2.02* 1.65* 1.42* 1.33*   NA  --  142 145 142   POTASSIUM 3.8 3.8 4.0 4.0   CHLORIDE  --  107 110* 110*   CO2  --  26 26 25   BUN  --  31.0* 29.1* 33.7*   CR  --  2.30* 2.26* 2.33*   ANIONGAP  --  9 9 7   MARIA INES  --  8.4* 8.3* 8.2*   GLC  --  90 88 101*   ALBUMIN  --   --  2.3* 2.3*   PROTTOTAL  --   --  5.6* 5.4*   BILITOTAL  --   --  0.2 <0.2   ALKPHOS  --   --  82 83   ALT  --   --  21 20   AST  --   --  41* 43*         Imaging:  No results found for this or any previous visit (from the past 24 hour(s)).              Edited by Danae Parker MD, 2/15/2023  7:04 PM       Danae Parker MD  Physician  Medicine  Progress Notes     Signed  Date of Service:  2/15/2023  7:00 PM  Creation Time:  2/15/2023  7:00 PM          Expand All Collapse All  [facility]  Hospitalist Progress Note     Date of Admission: 1/28/2023        Interval History     Patient seen today with her brother-in-law present.  She is feeling okay.  Has no acute concerns.  Plans for TCU.  Seen with PT present.  Endurance may be improving.  Will await for her INR to get therapeutic and likely be able to discharge.           Assessment & Plan      Shira Rodriguez is a 62-year-old female with rheumatoid arthritis on methotrexate, prior massive PE on warfarin, morbid obesity who presented on 1/28/2023 with severe sepsis.  She was found unresponsive in her home. She  Was hypotensive and had temp of 100.2 in route to hospital.      Severe sepsis-present on admission  Candida intertrigo  Neutropenic fever,  resolved, now with leukocytosis likely secondary to Neupogen  Lactic acidosis, Resolved     Patient admitted 1/28 with sepsis Lactate of 3.2.  and WBC 0.4. Severe sepsis on admission     Given fluids and responded with no need for ICU/pressor     DDX include panniculitis (significant redness under abd pannus and breasts) or mucositis (severe throat pain x 2 days) and duodenitis with severe neutropenia.    CT C/A/P showed small chronic left pleural effusion, small fluid around distal esophagus and duodenum    TTE 1/30 with possible vegetation; LETA 1/31 negative for vegetation or dissection    repeat CT chest 2/4 (due to cough) with slight increase in bilateral pleural effusions, mild increase in atelectasis      ID consulted with extensive infectious work-up: positive fungitell (significance unclear), positive EBV IgG (but negative IgM and DNA)    negative infectious work-up:  UA and culture, blood cultures, COVID/RSV/flu, Hep B&C, HIV, HSV, CMV, aspergillus, Blastomyces, histoplasmosis, anaplasma, Rickettsia, Ehrlichia     Patient completed 5 day course of doxycycline 2/3 (empiric tick born illness)     Completed 7 days course of fluconazole 2/6  (candida intertrigo)     Cefepime 1/30- 2/8 while neutropenic. >> stopped when counts recovered     Blood cultures 2/7 NGTD X 2    1/29 + Beta D glucan fungitell     WBC 31,00 s/p neuopen 2/7         Pancytopenia with severe leukopenia and neutropenia, moderate anemia and thrombocytopenia  Severe vitamin B12 deficiency and folate deficiency    CBC wnl 12/7/2022;     On admission 1/28 - Hb 9.4, WBC 0.4, ANC 0.1, platelets 59    Iron studies 1/29 indicating anemia of chronic disease.  Reticulocyte count suppressed.  B12 undetectable and folate low at 2.8     initiated on B12 and folate, ultimately started on leucovorin rescue 2/2-2/5 per Heme    methotrexate level 1/30 undetectable      Peripheral smear 1/29 showed moderate to marked pancytopenia. Negative for  schistocytes and elevated fibrinogen argues against DIC. Negative for circulating blasts on scanning.     flow cytometry shows polytypic B cells, no aberrant immunophenotype on T cells and rare to absent CD34 positive blasts    elevated Kappa and lambda free light chains, monoclonal IgM immunoglobulin of lambda light chain type, IgM is elevated at 395 and IgG and IgA are within normal limits      immunological work-up showed C3 and C4 wnl, ANCA and ds-DNA negative\    s/p bone marrow biopsy on 2/3/2023 with no clear etiology (see report for details) but no current evidence for neoplasm or aplastic anemia - Heme suspects hypocellularity primarily due to methotrexate, B12 and folate deficiency     received 1U PRBC 1/31 and 2/4    received daily platelet transfusions 1/30-2/5 for plt <50K     repeat B12 level 2/8 >4K; will need ongoing outpatient monitoring and monthly B12 injections    received daily Neupogen 1/30-2/7; stopped with recovery of cell counts    2/14/2023 WBC 31,900, HGB 7.9 and platelet 160     Continue folate.     B12 supplement.     BM cytogenetic studies pending     per Heme; transfuse for plt <20K or signs of bleeding; hgb <7 g/dl        KRISS on CKD stage 4, resolved  Mild anion gap metabolic acidosis, resolved      Normal renal function in 2021, Cr 1.7 5/2022, more recent baseline creatinine 2-2.2.   Creatinine on admission 3.25.    Nephrology consulted, suspect pre-renal with possible progression to ATN, treated with IVF with gradual improvement    Neph signed off 2/3.     Neph re-consulted 2/8 due to worsening renal function of unclear etiology, no improvement with fluid bolus    Urine eosinophil negative    Creatinine 2.3 on 2/15    BMP added to today's labs      Probable esophagitis  Probable duodenitis  Dysphagia   Mucositis     she has been complaining of throat pain; oral ulcers presents on exam      CT on admission showed small amount of fluid adjacent to distal thoracic esophagus and  scattered posterior mediastinal lymph nodes along esophagus of uncertain etiology but could be secondary to esophagitis or other inflammatory process.  CT scan also showed small edema around second and third portion of duodenum.     SLP following, has advanced to reg diet     continue Protonix    odynophagia has resolved and mouth pain improving     Panniculitis, improving  Candidal rash-under pannus, breasts, improving  * treated with fluconazole 1/30-2/6 per ID  - Wound care on board , patient getting wound care and received  - continue miconazole cream     Hypokalemia  -Being replaced     Type II MI due to severe sepsis     High-sensitivity troponin mildly elevated at 48 and repeat was normal and can be due to demand     TTE 1/30 with normal LV function without WMA, RV with mildly decreased systolic function     History of massive PE causing PEA arrest, 9/2019    last dose of warfarin was 1/29, but INR has remained therapeutic through 2/5.     as above, significant discrepancy between GFR calculation with cystatin c vs Cr; and so was started on heparin gtt bridge to coumadin, INR today 1.65    pharmacy managing Coumadin dose, continue heparin until therapeutic INR    INR 2.2.  Discussed with pharmacy likely able to stop heparin at this time 2/15     Rheumatoid arthritis   Historically on methotrexate, resumed about 2 weeks prior to admission due to worsening back and shoulder pain.     Previous provider had sent inbox message to outpatient rheumatologist, Dr. Austin at [facility], on 2/8 and he plans for clinic to contact her to schedule f/u appt within 1 month    -Hold methotrexate, follow up with outpatient rheumatologist      COPD  chronic cough  * has chronic cough for years and follows with pulmonary at [facility]  * CXR 2/3 showed mild opacity at the left lung base is new since the previous exam - atelectasis vs pneumonia.   * CT chest 2/4 with findings as above   - no signs of acute exacerbation     Chronic major  "depression  Chronic pain  * see recent pain clinic visit summary from 1/17/2023 for background information  - Continue Abilify, Cymbalta and Neurontin     Essential hypertension  PTA lisinopril initially held due to KRISS and hypotension, which have resolved.  - remains normotensive at this time, resume as indicated     Morbid obesity, BMI 46  Estimated body mass index is 45.9 kg/m  as calculated from the following:    Height as of this encounter: 1.676 m (5' 6\").    Weight as of this encounter: 129 kg (284 lb 6.3 oz).     Acute metabolic encephalopathy secondary to severe sepsis, resolved  Admission head CT negative for acute pathology.  Returned to baseline mental status with treatment of sepsis.      Mild hypovolemic hyponatremia, resolved  Admission sodium 132, normalized with IVF.     Sodium 142      Hypomagnesemia, resolved  * replaced per protocol     3 cm right adrenal nodule  * Noted on CT.  Was present on previous imaging as well.  Likely benign  * A.m. cortisol 1/29, normal at 43.9     Hypoalbuminemia, albumin 2.5     Hyperuricemia  * uric acid 8.9 on 1/29/23      Disposition:     Anticipate TCU     currently working on disposition placement.        Diet: Combination Diet Regular Diet; Thin Liquids (level 0) (no straws)  Snacks/Supplements Adult: Other; D: magic cup (RD); With Meals  Underwood Catheter: Not present  DVT Prophylaxis: back on coumadin   Code Status: Full Code        Expected Discharge Date: 02/16/2023    Discharge Delays: Placement - TCU            KELVIN LEDEZMA MD,   Hospitalist Service  [facility]  ______________________________________________________________________     -Data reviewed today: I reviewed all new labs and imaging results over the last 24 hours. I personally reviewed no images or EKG's today.           Physical Exam     Temp: 97.4  F (36.3  C) Temp src: Oral BP: 135/80 Pulse: 87   Resp: 16 SpO2: 96 % O2 Device: None (Room air)        "   Vitals:     02/13/23 1529 02/14/23 0711 02/15/23 0620   Weight: 112.9 kg (249 lb) 112.9 kg (249 lb) 112.9 kg (249 lb)   Constitutional: Appears stated age, no acute distress.  Respiratory: Breath sounds CTA. No increased work of breathing.  Cardiovascular: RRR, no rub or murmur.  GI: Soft, non-tender, non-distended.obesity   Skin: Warm, dry, no rashes or lesions.  Other:   Rash in posterior erythema with plaque   Periwick in place            Medications        heparin 1,800 Units/hr (02/15/23 1019)        ARIPiprazole  5 mg Oral Daily     atorvastatin  40 mg Oral QPM     cyanocobalamin  1,000 mcg Sublingual Daily     DULoxetine  120 mg Oral Daily     folic acid  2 mg Oral Daily     gabapentin  100 mg Oral TID     miconazole   Topical BID     multivitamin w/minerals  1 tablet Oral Daily     nystatin  500,000 Units Swish & Spit 4x Daily     pantoprazole  40 mg Oral BID AC     sodium chloride (PF)  3 mL Intracatheter Q8H     warfarin ANTICOAGULANT  7.5 mg Oral ONCE at 18:00     Warfarin Therapy Reminder  1 each Oral See Admin Instructions               Data

## 2023-02-17 NOTE — PLAN OF CARE
A/Ox4, VSS on RA. Regular diet w/ thin liquids, tolerating well. A2 GB/W to bathroom/commode, purewick at night. BMx1 this shift. Hard, formed stool, refused softener. T/R q2hr. Walked with therapy x2. Denies pain, N/V. R PIV SL. Refused pre-meds before wound cares anymore. Wounds under breasts, panus, R knee fold, and umbilicus done per POC. Wound cares completed. Heparin drip discontinued. HepXa checks discontinued. SW following for discharge, pending TCU placement.

## 2023-02-18 LAB
BASOPHILS # BLD MANUAL: 0.2 10E3/UL (ref 0–0.2)
BASOPHILS NFR BLD MANUAL: 1 %
BLASTS # BLD MANUAL: 0.2 10E3/UL
BLASTS NFR BLD MANUAL: 1 %
CREAT SERPL-MCNC: 1.92 MG/DL (ref 0.51–0.95)
EOSINOPHIL # BLD MANUAL: 0 10E3/UL (ref 0–0.7)
EOSINOPHIL NFR BLD MANUAL: 0 %
ERYTHROCYTE [DISTWIDTH] IN BLOOD BY AUTOMATED COUNT: 16.6 % (ref 10–15)
GFR SERPL CREATININE-BSD FRML MDRD: 29 ML/MIN/1.73M2
HCT VFR BLD AUTO: 28.2 % (ref 35–47)
HGB BLD-MCNC: 8.6 G/DL (ref 11.7–15.7)
INR PPP: 2.74 (ref 0.85–1.15)
LYMPHOCYTES # BLD MANUAL: 1.4 10E3/UL (ref 0.8–5.3)
LYMPHOCYTES NFR BLD MANUAL: 7 %
MCH RBC QN AUTO: 29 PG (ref 26.5–33)
MCHC RBC AUTO-ENTMCNC: 30.5 G/DL (ref 31.5–36.5)
MCV RBC AUTO: 95 FL (ref 78–100)
METAMYELOCYTES # BLD MANUAL: 1 10E3/UL
METAMYELOCYTES NFR BLD MANUAL: 5 %
MONOCYTES # BLD MANUAL: 0.4 10E3/UL (ref 0–1.3)
MONOCYTES NFR BLD MANUAL: 2 %
MYELOCYTES # BLD MANUAL: 0.4 10E3/UL
MYELOCYTES NFR BLD MANUAL: 2 %
NEUTROPHILS # BLD MANUAL: 16.9 10E3/UL (ref 1.6–8.3)
NEUTROPHILS NFR BLD MANUAL: 82 %
PLAT MORPH BLD: ABNORMAL
PLATELET # BLD AUTO: 213 10E3/UL (ref 150–450)
POTASSIUM SERPL-SCNC: 4.1 MMOL/L (ref 3.4–5.3)
RBC # BLD AUTO: 2.97 10E6/UL (ref 3.8–5.2)
RBC MORPH BLD: ABNORMAL
WBC # BLD AUTO: 20.6 10E3/UL (ref 4–11)

## 2023-02-18 PROCEDURE — 84132 ASSAY OF SERUM POTASSIUM: CPT | Performed by: INTERNAL MEDICINE

## 2023-02-18 PROCEDURE — 85014 HEMATOCRIT: CPT | Performed by: HOSPITALIST

## 2023-02-18 PROCEDURE — 36415 COLL VENOUS BLD VENIPUNCTURE: CPT | Performed by: INTERNAL MEDICINE

## 2023-02-18 PROCEDURE — 250N000013 HC RX MED GY IP 250 OP 250 PS 637: Performed by: HOSPITALIST

## 2023-02-18 PROCEDURE — 250N000013 HC RX MED GY IP 250 OP 250 PS 637: Performed by: INTERNAL MEDICINE

## 2023-02-18 PROCEDURE — 99232 SBSQ HOSP IP/OBS MODERATE 35: CPT | Performed by: INTERNAL MEDICINE

## 2023-02-18 PROCEDURE — 85007 BL SMEAR W/DIFF WBC COUNT: CPT | Performed by: HOSPITALIST

## 2023-02-18 PROCEDURE — 36415 COLL VENOUS BLD VENIPUNCTURE: CPT | Performed by: HOSPITALIST

## 2023-02-18 PROCEDURE — 250N000013 HC RX MED GY IP 250 OP 250 PS 637: Performed by: NURSE PRACTITIONER

## 2023-02-18 PROCEDURE — 120N000001 HC R&B MED SURG/OB

## 2023-02-18 PROCEDURE — 85610 PROTHROMBIN TIME: CPT | Performed by: HOSPITALIST

## 2023-02-18 PROCEDURE — 82565 ASSAY OF CREATININE: CPT | Performed by: INTERNAL MEDICINE

## 2023-02-18 RX ORDER — WARFARIN SODIUM 2.5 MG/1
2.5 TABLET ORAL
Status: COMPLETED | OUTPATIENT
Start: 2023-02-18 | End: 2023-02-18

## 2023-02-18 RX ADMIN — GABAPENTIN 100 MG: 100 CAPSULE ORAL at 13:27

## 2023-02-18 RX ADMIN — PANTOPRAZOLE SODIUM 40 MG: 40 TABLET, DELAYED RELEASE ORAL at 17:10

## 2023-02-18 RX ADMIN — GABAPENTIN 100 MG: 100 CAPSULE ORAL at 09:25

## 2023-02-18 RX ADMIN — NYSTATIN 500000 UNITS: 100000 SUSPENSION ORAL at 22:16

## 2023-02-18 RX ADMIN — Medication 1000 MCG: at 09:25

## 2023-02-18 RX ADMIN — WARFARIN SODIUM 2.5 MG: 2.5 TABLET ORAL at 17:10

## 2023-02-18 RX ADMIN — ATORVASTATIN CALCIUM 40 MG: 40 TABLET, FILM COATED ORAL at 20:37

## 2023-02-18 RX ADMIN — NYSTATIN 500000 UNITS: 100000 SUSPENSION ORAL at 13:27

## 2023-02-18 RX ADMIN — NYSTATIN 500000 UNITS: 100000 SUSPENSION ORAL at 09:26

## 2023-02-18 RX ADMIN — NYSTATIN 500000 UNITS: 100000 SUSPENSION ORAL at 17:10

## 2023-02-18 RX ADMIN — ARIPIPRAZOLE 5 MG: 5 TABLET ORAL at 09:25

## 2023-02-18 RX ADMIN — MULTIPLE VITAMINS W/ MINERALS TAB 1 TABLET: TAB at 09:26

## 2023-02-18 RX ADMIN — PANTOPRAZOLE SODIUM 40 MG: 40 TABLET, DELAYED RELEASE ORAL at 07:07

## 2023-02-18 RX ADMIN — MICONAZOLE NITRATE: 20 CREAM TOPICAL at 22:52

## 2023-02-18 RX ADMIN — DULOXETINE HYDROCHLORIDE 120 MG: 60 CAPSULE, DELAYED RELEASE ORAL at 09:25

## 2023-02-18 RX ADMIN — FOLIC ACID 2 MG: 1 TABLET ORAL at 09:26

## 2023-02-18 RX ADMIN — GABAPENTIN 100 MG: 100 CAPSULE ORAL at 20:37

## 2023-02-18 RX ADMIN — MICONAZOLE NITRATE: 20 CREAM TOPICAL at 14:02

## 2023-02-18 ASSESSMENT — ACTIVITIES OF DAILY LIVING (ADL)
ADLS_ACUITY_SCORE: 50
ADLS_ACUITY_SCORE: 52
ADLS_ACUITY_SCORE: 50
ADLS_ACUITY_SCORE: 50
ADLS_ACUITY_SCORE: 52
ADLS_ACUITY_SCORE: 50
ADLS_ACUITY_SCORE: 50

## 2023-02-18 NOTE — PLAN OF CARE
Goal Outcome Evaluation:    0700-1930: Pt A/O, VSS on RA. Denies pain, nausea. Up A1/SBA with GB/W to BR, BSC, and chair for meals. T/R q2h when in bed. Wound cares completed per orders. 1 large BM this shift. PT/OT following, pt ambulated in halls with therapy. Discharge pending placement.

## 2023-02-18 NOTE — PLAN OF CARE
Goal Outcome Evaluation: 1930-0700    A&O x4, pleasant. Was up in chair first few hours of shift. Back to bed with SBA. VSS on RA. Turn and repo'd overnight. Purewick placed overnight per pt request, removed for day shift. PIV VALDO. Potassium protocol, recheck this am. Denied pain.     Discharge pending TCU placement.

## 2023-02-18 NOTE — PROGRESS NOTES
[facility]  Hospitalist Progress Note    Date of Admission: 1/28/2023    Interval History   Nursing reports that patient has been doing fairly well and improving her endurance.  The patient herself is pleased that she was out in the hallway today.  Feels like she is getting stronger awaiting TCU placement.  Patient okay for discharge at any time    Assessment & Plan    Shira Rodriguez is a 62-year-old female with rheumatoid arthritis on methotrexate, prior massive PE on warfarin, morbid obesity who presented on 1/28/2023 with severe sepsis.  She was found unresponsive in her home. She  Was hypotensive and had temp of 100.2 in route to hospital.      Severe sepsis-present on admission  Candida intertrigo  Neutropenic fever, resolved, now with leukocytosis likely secondary to Neupogen  Lactic acidosis, Resolved     Patient admitted 1/28 with sepsis Lactate of 3.2.  and WBC 0.4. Severe sepsis on admission     Given fluids and responded with no need for ICU/pressor     DDX include panniculitis (significant redness under abd pannus and breasts) or mucositis (severe throat pain x 2 days) and duodenitis with severe neutropenia.    CT C/A/P showed small chronic left pleural effusion, small fluid around distal esophagus and duodenum    TTE 1/30 with possible vegetation; LETA 1/31 negative for vegetation or dissection    repeat CT chest 2/4 (due to cough) with slight increase in bilateral pleural effusions, mild increase in atelectasis      ID consulted with extensive infectious work-up: positive fungitell (significance unclear), positive EBV IgG (but negative IgM and DNA)    negative infectious work-up:  UA and culture, blood cultures, COVID/RSV/flu, Hep B&C, HIV, HSV, CMV, aspergillus, Blastomyces, histoplasmosis, anaplasma, Rickettsia, Ehrlichia     Patient completed 5 day course of doxycycline 2/3 (empiric tick born illness)     Completed 7 days course of fluconazole 2/6  (candida  intertrigo)     Cefepime 1/30- 2/8 while neutropenic. >> stopped when counts recovered     Blood cultures 2/7 NGTD X 2    1/29 + Beta D glucan fungitell     WBC 31,00 s/p neuopen 2/7 2/16 White count 23,000    2/17  22,000        Pancytopenia with severe leukopenia and neutropenia, moderate anemia and thrombocytopenia  Severe vitamin B12 deficiency and folate deficiency    CBC wnl 12/7/2022;     On admission 1/28 - Hb 9.4, WBC 0.4, ANC 0.1, platelets 59    Iron studies 1/29 indicating anemia of chronic disease.  Reticulocyte count suppressed.  B12 undetectable and folate low at 2.8     initiated on B12 and folate, ultimately started on leucovorin rescue 2/2-2/5 per Heme    methotrexate level 1/30 undetectable      Peripheral smear 1/29 showed moderate to marked pancytopenia. Negative for schistocytes and elevated fibrinogen argues against DIC. Negative for circulating blasts on scanning.     flow cytometry shows polytypic B cells, no aberrant immunophenotype on T cells and rare to absent CD34 positive blasts    elevated Kappa and lambda free light chains, monoclonal IgM immunoglobulin of lambda light chain type, IgM is elevated at 395 and IgG and IgA are within normal limits      immunological work-up showed C3 and C4 wnl, ANCA and ds-DNA negative\    s/p bone marrow biopsy on 2/3/2023 with no clear etiology (see report for details) but no current evidence for neoplasm or aplastic anemia - Heme suspects hypocellularity primarily due to methotrexate, B12 and folate deficiency     received 1U PRBC 1/31 and 2/4    received daily platelet transfusions 1/30-2/5 for plt <50K     repeat B12 level 2/8 >4K; will need ongoing outpatient monitoring and monthly B12 injections    received daily Neupogen 1/30-2/7; stopped with recovery of cell counts    Continue folate.     B12 supplement.     BM cytogenetic studies pending     per Heme; transfuse for plt <20K or signs of bleeding; hgb <7 g/dl    2/17 with white count 22,000,  hemoglobin 8.2 and platelets 177        KRISS on CKD stage 4, resolved  Mild anion gap metabolic acidosis, resolved      Normal renal function in 2021, Cr 1.7 5/2022, more recent baseline creatinine 2-2.2.   Creatinine on admission 3.25.    Nephrology consulted, suspect pre-renal with possible progression to ATN, treated with IVF with gradual improvement    Neph signed off 2/3.     Neph re-consulted 2/8 due to worsening renal function of unclear etiology, no improvement with fluid bolus    Urine eosinophil negative    Creatinine 2.1 all on 2/15    Probable esophagitis  Probable duodenitis  Dysphagia   Mucositis     she has been complaining of throat pain; oral ulcers presents on exam      CT on admission showed small amount of fluid adjacent to distal thoracic esophagus and scattered posterior mediastinal lymph nodes along esophagus of uncertain etiology but could be secondary to esophagitis or other inflammatory process.  CT scan also showed small edema around second and third portion of duodenum.     SLP following, has advanced to reg diet     continue Protonix    odynophagia has resolved and mouth pain improving    Resolved     Panniculitis, improving  Candidal rash-under pannus, breasts, improving  * treated with fluconazole 1/30-2/6 per ID  - Wound care on board , patient getting wound care and received  - continue miconazole cream     Hypokalemia  -Being replaced     Type II MI due to severe sepsis     High-sensitivity troponin mildly elevated at 48 and repeat was normal and can be due to demand     TTE 1/30 with normal LV function without WMA, RV with mildly decreased systolic function     History of massive PE causing PEA arrest, 9/2019    last dose of warfarin was 1/29, but INR has remained therapeutic through 2/5.     as above, significant discrepancy between GFR calculation with cystatin c vs Cr; and so was started on heparin gtt bridge to coumadin, INR today 1.65    pharmacy managing Coumadin dose,  "continue heparin until therapeutic INR    INR therapeutic stopped heparin 2/15    INR daily per pharmacy with dosing     Rheumatoid arthritis   Historically on methotrexate, resumed about 2 weeks prior to admission due to worsening back and shoulder pain.     Previous provider had sent inbox message to outpatient rheumatologist, Dr. Austin at [facility], on 2/8 and he plans for clinic to contact her to schedule f/u appt within 1 month    -Hold methotrexate, follow up with outpatient rheumatologist     COPD  chronic cough  * has chronic cough for years and follows with pulmonary at [facility]  * CXR 2/3 showed mild opacity at the left lung base is new since the previous exam - atelectasis vs pneumonia.   * CT chest 2/4 with findings as above   - no signs of acute exacerbation     Chronic major depression  Chronic pain  * see recent pain clinic visit summary from 1/17/2023 for background information  - Continue Abilify, Cymbalta and Neurontin     Essential hypertension  PTA lisinopril initially held due to KRISS and hypotension, which have resolved.  - remains normotensive at this time, resume as indicated     Morbid obesity, BMI 46  Estimated body mass index is 45.9 kg/m  as calculated from the following:    Height as of this encounter: 1.676 m (5' 6\").    Weight as of this encounter: 129 kg (284 lb 6.3 oz).     Acute metabolic encephalopathy secondary to severe sepsis, resolved  Admission head CT negative for acute pathology.  Returned to baseline mental status with treatment of sepsis.      Mild hypovolemic hyponatremia, resolved  Admission sodium 132, normalized with IVF.     Sodium 142      Hypomagnesemia, resolved  * replaced per protocol     3 cm right adrenal nodule  * Noted on CT.  Was present on previous imaging as well.  Likely benign  * A.m. cortisol 1/29, normal at 43.9     Hypoalbuminemia, albumin 2.5     Hyperuricemia  * uric acid 8.9 on 1/29/23      Disposition:     Anticipate TCU     currently " working on disposition placement.    TCU placement when available.  Endurance is slightly improving.  Reportedly ambulated in the hallway today.    Diet: Combination Diet Regular Diet; Thin Liquids (level 0) (no straws)  Snacks/Supplements Adult: Other; D: magic cup (RD); With Meals  Underwood Catheter: Not present  DVT Prophylaxis: back on coumadin   Code Status: Full Code       Expected Discharge Date: 02/16/2023    Discharge Delays: Placement - TCU           KELVIN LEDEZMA MD,   Hospitalist Service  [facility]  ______________________________________________________________________    -Data reviewed today: I reviewed all new labs and imaging results over the last 24 hours. I personally reviewed no images or EKG's today.    Physical Exam   Temp: 98.2  F (36.8  C) Temp src: Oral BP: 110/64 Pulse: 94   Resp: 16 SpO2: 95 % O2 Device: None (Room air)    Vitals:    02/13/23 1529 02/14/23 0711 02/15/23 0620   Weight: 112.9 kg (249 lb) 112.9 kg (249 lb) 112.9 kg (249 lb)   Constitutional: Appears stated age, no acute distress.  Respiratory: Breath sounds CTA. No increased work of breathing.  Cardiovascular: RRR, no rub or murmur.  GI: Soft, non-tender, non-distended.obesity   Skin: Warm, dry, no rashes or lesions.  Other: Rash in posterior knee with erythema with plaque   Periwick in place     Medications       ARIPiprazole  5 mg Oral Daily     atorvastatin  40 mg Oral QPM     cyanocobalamin  1,000 mcg Sublingual Daily     DULoxetine  120 mg Oral Daily     folic acid  2 mg Oral Daily     gabapentin  100 mg Oral TID     miconazole   Topical BID     multivitamin w/minerals  1 tablet Oral Daily     nystatin  500,000 Units Swish & Spit 4x Daily     pantoprazole  40 mg Oral BID AC     sodium chloride (PF)  3 mL Intracatheter Q8H     Warfarin Therapy Reminder  1 each Oral See Admin Instructions       Data   Recent Labs   Lab 02/16/23  0758 02/15/23  0745 02/14/23  0714 02/13/23  0826 02/12/23  0731   WBC  23.2* 28.3* 31.9* 35.5* 33.9*   HGB 8.1* 8.0* 7.9* 8.0* 8.3*   MCV 95 93 94 92 93    182 160 132* 137*   INR 2.35* 2.02* 1.65* 1.42* 1.33*   NA  --  144 142 145 142   POTASSIUM 3.7 3.9  3.8 3.8 4.0 4.0   CHLORIDE  --  107 107 110* 110*   CO2  --  24 26 26 25   BUN  --  30.1* 31.0* 29.1* 33.7*   CR  --  2.10* 2.30* 2.26* 2.33*   ANIONGAP  --  13 9 9 7   MARIA INES  --  8.6* 8.4* 8.3* 8.2*   GLC  --  85 90 88 101*   ALBUMIN  --   --   --  2.3* 2.3*   PROTTOTAL  --   --   --  5.6* 5.4*   BILITOTAL  --   --   --  0.2 <0.2   ALKPHOS  --   --   --  82 83   ALT  --   --   --  21 20   AST  --   --   --  41* 43*       Imaging:  No results found for this or any previous visit (from the past 24 hour(s)).

## 2023-02-19 LAB
BASOPHILS # BLD MANUAL: 0.4 10E3/UL (ref 0–0.2)
BASOPHILS NFR BLD MANUAL: 2 %
EOSINOPHIL # BLD MANUAL: 0.2 10E3/UL (ref 0–0.7)
EOSINOPHIL NFR BLD MANUAL: 1 %
ERYTHROCYTE [DISTWIDTH] IN BLOOD BY AUTOMATED COUNT: 17 % (ref 10–15)
HCT VFR BLD AUTO: 25.7 % (ref 35–47)
HGB BLD-MCNC: 8.1 G/DL (ref 11.7–15.7)
INR PPP: 2.63 (ref 0.85–1.15)
LYMPHOCYTES # BLD MANUAL: 1.8 10E3/UL (ref 0.8–5.3)
LYMPHOCYTES NFR BLD MANUAL: 10 %
MCH RBC QN AUTO: 29.5 PG (ref 26.5–33)
MCHC RBC AUTO-ENTMCNC: 31.5 G/DL (ref 31.5–36.5)
MCV RBC AUTO: 94 FL (ref 78–100)
METAMYELOCYTES # BLD MANUAL: 0.5 10E3/UL
METAMYELOCYTES NFR BLD MANUAL: 3 %
MONOCYTES # BLD MANUAL: 0.7 10E3/UL (ref 0–1.3)
MONOCYTES NFR BLD MANUAL: 4 %
MYELOCYTES # BLD MANUAL: 0.2 10E3/UL
MYELOCYTES NFR BLD MANUAL: 1 %
NEUTROPHILS # BLD MANUAL: 14.3 10E3/UL (ref 1.6–8.3)
NEUTROPHILS NFR BLD MANUAL: 79 %
PLAT MORPH BLD: ABNORMAL
PLATELET # BLD AUTO: 220 10E3/UL (ref 150–450)
POTASSIUM SERPL-SCNC: 3.7 MMOL/L (ref 3.4–5.3)
RBC # BLD AUTO: 2.75 10E6/UL (ref 3.8–5.2)
RBC MORPH BLD: ABNORMAL
WBC # BLD AUTO: 18.1 10E3/UL (ref 4–11)

## 2023-02-19 PROCEDURE — 250N000013 HC RX MED GY IP 250 OP 250 PS 637: Performed by: HOSPITALIST

## 2023-02-19 PROCEDURE — 250N000013 HC RX MED GY IP 250 OP 250 PS 637: Performed by: INTERNAL MEDICINE

## 2023-02-19 PROCEDURE — 85027 COMPLETE CBC AUTOMATED: CPT | Performed by: HOSPITALIST

## 2023-02-19 PROCEDURE — 85007 BL SMEAR W/DIFF WBC COUNT: CPT | Performed by: HOSPITALIST

## 2023-02-19 PROCEDURE — 85610 PROTHROMBIN TIME: CPT | Performed by: HOSPITALIST

## 2023-02-19 PROCEDURE — 120N000001 HC R&B MED SURG/OB

## 2023-02-19 PROCEDURE — 250N000013 HC RX MED GY IP 250 OP 250 PS 637: Performed by: NURSE PRACTITIONER

## 2023-02-19 PROCEDURE — 99232 SBSQ HOSP IP/OBS MODERATE 35: CPT | Performed by: INTERNAL MEDICINE

## 2023-02-19 PROCEDURE — 84132 ASSAY OF SERUM POTASSIUM: CPT | Performed by: INTERNAL MEDICINE

## 2023-02-19 PROCEDURE — 36415 COLL VENOUS BLD VENIPUNCTURE: CPT | Performed by: HOSPITALIST

## 2023-02-19 RX ORDER — WARFARIN SODIUM 2.5 MG/1
2.5 TABLET ORAL
Status: COMPLETED | OUTPATIENT
Start: 2023-02-19 | End: 2023-02-19

## 2023-02-19 RX ADMIN — NYSTATIN 500000 UNITS: 100000 SUSPENSION ORAL at 22:08

## 2023-02-19 RX ADMIN — MICONAZOLE NITRATE: 20 CREAM TOPICAL at 22:09

## 2023-02-19 RX ADMIN — ATORVASTATIN CALCIUM 40 MG: 40 TABLET, FILM COATED ORAL at 19:53

## 2023-02-19 RX ADMIN — PANTOPRAZOLE SODIUM 40 MG: 40 TABLET, DELAYED RELEASE ORAL at 06:58

## 2023-02-19 RX ADMIN — FOLIC ACID 2 MG: 1 TABLET ORAL at 09:12

## 2023-02-19 RX ADMIN — Medication 1000 MCG: at 09:09

## 2023-02-19 RX ADMIN — NYSTATIN 500000 UNITS: 100000 SUSPENSION ORAL at 13:27

## 2023-02-19 RX ADMIN — PANTOPRAZOLE SODIUM 40 MG: 40 TABLET, DELAYED RELEASE ORAL at 17:34

## 2023-02-19 RX ADMIN — WARFARIN SODIUM 2.5 MG: 2.5 TABLET ORAL at 17:34

## 2023-02-19 RX ADMIN — ARIPIPRAZOLE 5 MG: 5 TABLET ORAL at 09:11

## 2023-02-19 RX ADMIN — GABAPENTIN 100 MG: 100 CAPSULE ORAL at 09:09

## 2023-02-19 RX ADMIN — NYSTATIN 500000 UNITS: 100000 SUSPENSION ORAL at 09:09

## 2023-02-19 RX ADMIN — MULTIPLE VITAMINS W/ MINERALS TAB 1 TABLET: TAB at 09:11

## 2023-02-19 RX ADMIN — GABAPENTIN 100 MG: 100 CAPSULE ORAL at 19:53

## 2023-02-19 RX ADMIN — GABAPENTIN 100 MG: 100 CAPSULE ORAL at 13:27

## 2023-02-19 RX ADMIN — NYSTATIN 500000 UNITS: 100000 SUSPENSION ORAL at 17:34

## 2023-02-19 RX ADMIN — MICONAZOLE NITRATE: 20 CREAM TOPICAL at 14:23

## 2023-02-19 RX ADMIN — DULOXETINE HYDROCHLORIDE 120 MG: 60 CAPSULE, DELAYED RELEASE ORAL at 09:11

## 2023-02-19 ASSESSMENT — ACTIVITIES OF DAILY LIVING (ADL)
ADLS_ACUITY_SCORE: 50
ADLS_ACUITY_SCORE: 48
ADLS_ACUITY_SCORE: 50

## 2023-02-19 NOTE — PROGRESS NOTES
[facility]  Hospitalist Progress Note    Date of Admission: 1/28/2023    Interval History   Patient doing well this morning.  She is tired.  Reports she is not a morning person.  Has not been up yet.  Denies any new concerns    Assessment & Plan    Shira Rodriguez is a 62-year-old female with rheumatoid arthritis on methotrexate, prior massive PE on warfarin, morbid obesity who presented on 1/28/2023 with severe sepsis.  She was found unresponsive in her home. She  Was hypotensive and had temp of 100.2 in route to hospital.      Severe sepsis-present on admission  Candida intertrigo  Neutropenic fever, resolved, now with leukocytosis likely secondary to Neupogen  Lactic acidosis, Resolved     Patient admitted 1/28 with sepsis Lactate of 3.2.  and WBC 0.4. Severe sepsis on admission     Given fluids and responded with no need for ICU/pressor     DDX include panniculitis (significant redness under abd pannus and breasts) or mucositis (severe throat pain x 2 days) and duodenitis with severe neutropenia.    CT C/A/P showed small chronic left pleural effusion, small fluid around distal esophagus and duodenum    TTE 1/30 with possible vegetation; LETA 1/31 negative for vegetation or dissection    repeat CT chest 2/4 (due to cough) with slight increase in bilateral pleural effusions, mild increase in atelectasis      ID consulted with extensive infectious work-up: positive fungitell (significance unclear), positive EBV IgG (but negative IgM and DNA)    negative infectious work-up:  UA and culture, blood cultures, COVID/RSV/flu, Hep B&C, HIV, HSV, CMV, aspergillus, Blastomyces, histoplasmosis, anaplasma, Rickettsia, Ehrlichia     Patient completed 5 day course of doxycycline 2/3 (empiric tick born illness)     Completed 7 days course of fluconazole 2/6  (candida intertrigo)     Cefepime 1/30- 2/8 while neutropenic. >> stopped when counts recovered     Blood cultures 2/7 NGTD X 2    1/29 +  Beta D glucan fungitell     WBC 31,00 s/p neuopen 2/7 2/16 White count 23,000    2/17  22,000    WBC 20,000        Pancytopenia with severe leukopenia and neutropenia, moderate anemia and thrombocytopenia  Severe vitamin B12 deficiency and folate deficiency    CBC wnl 12/7/2022;     On admission 1/28 - Hb 9.4, WBC 0.4, ANC 0.1, platelets 59    Iron studies 1/29 indicating anemia of chronic disease.  Reticulocyte count suppressed.  B12 undetectable and folate low at 2.8     initiated on B12 and folate, ultimately started on leucovorin rescue 2/2-2/5 per Heme    methotrexate level 1/30 undetectable      Peripheral smear 1/29 showed moderate to marked pancytopenia. Negative for schistocytes and elevated fibrinogen argues against DIC. Negative for circulating blasts on scanning.     flow cytometry shows polytypic B cells, no aberrant immunophenotype on T cells and rare to absent CD34 positive blasts    elevated Kappa and lambda free light chains, monoclonal IgM immunoglobulin of lambda light chain type, IgM is elevated at 395 and IgG and IgA are within normal limits      immunological work-up showed C3 and C4 wnl, ANCA and ds-DNA negative\    s/p bone marrow biopsy on 2/3/2023 with no clear etiology (see report for details) but no current evidence for neoplasm or aplastic anemia - Heme suspects hypocellularity primarily due to methotrexate, B12 and folate deficiency     received 1U PRBC 1/31 and 2/4    received daily platelet transfusions 1/30-2/5 for plt <50K     repeat B12 level 2/8 >4K; will need ongoing outpatient monitoring and monthly B12 injections    received daily Neupogen 1/30-2/7; stopped with recovery of cell counts    Continue folate.     B12 supplement.     BM cytogenetic studies pending     per Heme; transfuse for plt <20K or signs of bleeding; hgb <7 g/dl    2/17 with white count 22,000, hemoglobin 8.2 and platelets 177        KRISS on CKD stage 4, resolved  Mild anion gap metabolic acidosis, resolved       Normal renal function in 2021, Cr 1.7 5/2022, more recent baseline creatinine 2-2.2.   Creatinine on admission 3.25.    Nephrology consulted, suspect pre-renal with possible progression to ATN, treated with IVF with gradual improvement    Neph signed off 2/3.     Neph re-consulted 2/8 due to worsening renal function of unclear etiology, no improvement with fluid bolus    Urine eosinophil negative    2/18 creatinine 1.92    Probable esophagitis  Probable duodenitis  Dysphagia   Mucositis     she has been complaining of throat pain; oral ulcers presents on exam      CT on admission showed small amount of fluid adjacent to distal thoracic esophagus and scattered posterior mediastinal lymph nodes along esophagus of uncertain etiology but could be secondary to esophagitis or other inflammatory process.  CT scan also showed small edema around second and third portion of duodenum.     SLP following, has advanced to reg diet     continue Protonix    odynophagia has resolved and mouth pain improving    Resolved     Panniculitis, improving  Candidal rash-under pannus, breasts, improving  * treated with fluconazole 1/30-2/6 per ID  - Wound care on board , patient getting wound care and received  - continue miconazole cream     Hypokalemia  -Being replaced     Type II MI due to severe sepsis     High-sensitivity troponin mildly elevated at 48 and repeat was normal and can be due to demand     TTE 1/30 with normal LV function without WMA, RV with mildly decreased systolic function     History of massive PE causing PEA arrest, 9/2019    last dose of warfarin was 1/29, but INR has remained therapeutic through 2/5.     as above, significant discrepancy between GFR calculation with cystatin c vs Cr; and so was started on heparin gtt bridge to coumadin, INR today 1.65    pharmacy managing Coumadin dose, continue heparin until therapeutic INR    INR therapeutic stopped heparin 2/15    INR daily per pharmacy with  "dosing     Rheumatoid arthritis   Historically on methotrexate, resumed about 2 weeks prior to admission due to worsening back and shoulder pain.     Previous provider had sent inbox message to outpatient rheumatologist, Dr. Austin at [facility], on 2/8 and he plans for clinic to contact her to schedule f/u appt within 1 month    -Hold methotrexate, follow up with outpatient rheumatologist     COPD  chronic cough  * has chronic cough for years and follows with pulmonary at [facility]  * CXR 2/3 showed mild opacity at the left lung base is new since the previous exam - atelectasis vs pneumonia.   * CT chest 2/4 with findings as above   - no signs of acute exacerbation     Chronic major depression  Chronic pain  * see recent pain clinic visit summary from 1/17/2023 for background information  - Continue Abilify, Cymbalta and Neurontin     Essential hypertension  PTA lisinopril initially held due to KRISS and hypotension, which have resolved.  - remains normotensive at this time, resume as indicated     Morbid obesity, BMI 46  Estimated body mass index is 45.9 kg/m  as calculated from the following:    Height as of this encounter: 1.676 m (5' 6\").    Weight as of this encounter: 129 kg (284 lb 6.3 oz).     Acute metabolic encephalopathy secondary to severe sepsis, resolved  Admission head CT negative for acute pathology.  Returned to baseline mental status with treatment of sepsis.      Mild hypovolemic hyponatremia, resolved  Admission sodium 132, normalized with IVF.     Sodium 142      Hypomagnesemia, resolved  * replaced per protocol     3 cm right adrenal nodule  * Noted on CT.  Was present on previous imaging as well.  Likely benign  * A.m. cortisol 1/29, normal at 43.9     Hypoalbuminemia, albumin 2.5     Hyperuricemia  * uric acid 8.9 on 1/29/23      Disposition:     Anticipate TCU     currently working on disposition placement.    TCU placement when available.  Endurance is slightly improving.    Awaiting " TCU placement anticipate Monday 2/20    Diet: Combination Diet Regular Diet; Thin Liquids (level 0) (no straws)  Snacks/Supplements Adult: Other; D: magic cup (RD); With Meals  Underwood Catheter: Not present  DVT Prophylaxis: back on coumadin   Code Status: Full Code       Expected Discharge Date: 02/16/2023    Discharge Delays: Placement - TCU           KELVIN LEDEZMA MD,   Hospitalist Service  [facility]  ______________________________________________________________________    -Data reviewed today: I reviewed all new labs and imaging results over the last 24 hours. I personally reviewed no images or EKG's today.    Physical Exam   Temp: 98.2  F (36.8  C) Temp src: Oral BP: 110/64 Pulse: 94   Resp: 16 SpO2: 95 % O2 Device: None (Room air)    Vitals:    02/13/23 1529 02/14/23 0711 02/15/23 0620   Weight: 112.9 kg (249 lb) 112.9 kg (249 lb) 112.9 kg (249 lb)   Constitutional: Appears stated age, no acute distress.  Respiratory: Breath sounds CTA. No increased work of breathing.  Cardiovascular: RRR, no rub or murmur.  GI: Soft, non-tender, non-distended.obesity patient has umbilical hernia.  No concerns of incarceration  Skin: Warm, dry, no rashes or lesions.  Other: Rash in posterior knee with erythema with plaque   Periwick in place       Medications       ARIPiprazole  5 mg Oral Daily     atorvastatin  40 mg Oral QPM     cyanocobalamin  1,000 mcg Sublingual Daily     DULoxetine  120 mg Oral Daily     folic acid  2 mg Oral Daily     gabapentin  100 mg Oral TID     miconazole   Topical BID     multivitamin w/minerals  1 tablet Oral Daily     nystatin  500,000 Units Swish & Spit 4x Daily     pantoprazole  40 mg Oral BID AC     sodium chloride (PF)  3 mL Intracatheter Q8H     Warfarin Therapy Reminder  1 each Oral See Admin Instructions       Data   Recent Labs   Lab 02/16/23  0758 02/15/23  0745 02/14/23  0714 02/13/23  0826 02/12/23  0731   WBC 23.2* 28.3* 31.9* 35.5* 33.9*   HGB 8.1* 8.0*  7.9* 8.0* 8.3*   MCV 95 93 94 92 93    182 160 132* 137*   INR 2.35* 2.02* 1.65* 1.42* 1.33*   NA  --  144 142 145 142   POTASSIUM 3.7 3.9  3.8 3.8 4.0 4.0   CHLORIDE  --  107 107 110* 110*   CO2  --  24 26 26 25   BUN  --  30.1* 31.0* 29.1* 33.7*   CR  --  2.10* 2.30* 2.26* 2.33*   ANIONGAP  --  13 9 9 7   MARIA INES  --  8.6* 8.4* 8.3* 8.2*   GLC  --  85 90 88 101*   ALBUMIN  --   --   --  2.3* 2.3*   PROTTOTAL  --   --   --  5.6* 5.4*   BILITOTAL  --   --   --  0.2 <0.2   ALKPHOS  --   --   --  82 83   ALT  --   --   --  21 20   AST  --   --   --  41* 43*       Imaging:  No results found for this or any previous visit (from the past 24 hour(s)).

## 2023-02-19 NOTE — PROGRESS NOTES
Care Management Follow Up    Length of Stay (days): 21    Expected Discharge Date: 02/20/2023     Concerns to be Addressed:       Patient plan of care discussed at interdisciplinary rounds: Yes    Anticipated Discharge Disposition: Transitional Care     Anticipated Discharge Services: None  Anticipated Discharge DME: None    Patient/family educated on Medicare website which has current facility and service quality ratings: yes  Education Provided on the Discharge Plan:    Patient/Family in Agreement with the Plan: yes    Referrals Placed by CM/SW:    Private pay costs discussed: Not applicable    Additional Information:  Writer re-sent referrals for TCU. Patient is now no longer needing a lift for transfers and is Assist of 1.     SW will continue to follow for TCU placement       CHARLENE Burns

## 2023-02-19 NOTE — PLAN OF CARE
Goal Outcome Evaluation:     0700-1930: Pt A/O, VSS on RA. Pt eager with cares. Up SBA/A1 GB/W to chair for meals and BSC, ambulated in halls today. Showered this afternoon. Denies pain, nausea. Mild discomfort in throat, pt declined interventions. Wound cares completed per POC. Discharge pending TCU placement.

## 2023-02-19 NOTE — PLAN OF CARE
Goal Outcome Evaluation: 1930-0700     A&O x4, pleasant. VSS on RA. Up 1 assist to BSC. Denies pain. No IV, pt requested to leave out, patient care order ok to leave PIV out overnight. On pulsate mattress. Offered to move to chair at 070, pt declined.     Discharge pending TCU placement.

## 2023-02-19 NOTE — PLAN OF CARE
3412-6362: Pt is A/Ox4, has VSS. Pt had slight pain in rt knee, denies pharm intervention. Wound care completed, pt tolerated well. Pt is pleasant and eager to get well. Pt is SBA with GB/W, was up in chair majority of the day. Uneventful shift. Discharge pending TCU placement.

## 2023-02-20 ENCOUNTER — APPOINTMENT (OUTPATIENT)
Dept: OCCUPATIONAL THERAPY | Facility: CLINIC | Age: 63
DRG: 871 | End: 2023-02-20
Payer: COMMERCIAL

## 2023-02-20 ENCOUNTER — APPOINTMENT (OUTPATIENT)
Dept: PHYSICAL THERAPY | Facility: CLINIC | Age: 63
DRG: 871 | End: 2023-02-20
Payer: COMMERCIAL

## 2023-02-20 ENCOUNTER — TELEPHONE (OUTPATIENT)
Dept: RHEUMATOLOGY | Facility: CLINIC | Age: 63
End: 2023-02-20
Payer: COMMERCIAL

## 2023-02-20 LAB
BASOPHILS # BLD MANUAL: 0.3 10E3/UL (ref 0–0.2)
BASOPHILS NFR BLD MANUAL: 2 %
EOSINOPHIL # BLD MANUAL: 0 10E3/UL (ref 0–0.7)
EOSINOPHIL NFR BLD MANUAL: 0 %
ERYTHROCYTE [DISTWIDTH] IN BLOOD BY AUTOMATED COUNT: 17.1 % (ref 10–15)
HCT VFR BLD AUTO: 26.9 % (ref 35–47)
HGB BLD-MCNC: 8.4 G/DL (ref 11.7–15.7)
INR PPP: 2.61 (ref 0.85–1.15)
LYMPHOCYTES # BLD MANUAL: 1.5 10E3/UL (ref 0.8–5.3)
LYMPHOCYTES NFR BLD MANUAL: 9 %
MCH RBC QN AUTO: 29.6 PG (ref 26.5–33)
MCHC RBC AUTO-ENTMCNC: 31.2 G/DL (ref 31.5–36.5)
MCV RBC AUTO: 95 FL (ref 78–100)
METAMYELOCYTES # BLD MANUAL: 0.5 10E3/UL
METAMYELOCYTES NFR BLD MANUAL: 3 %
MONOCYTES # BLD MANUAL: 0.2 10E3/UL (ref 0–1.3)
MONOCYTES NFR BLD MANUAL: 1 %
NEUTROPHILS # BLD MANUAL: 13.8 10E3/UL (ref 1.6–8.3)
NEUTROPHILS NFR BLD MANUAL: 85 %
PLAT MORPH BLD: ABNORMAL
PLATELET # BLD AUTO: 339 10E3/UL (ref 150–450)
POTASSIUM SERPL-SCNC: 4.1 MMOL/L (ref 3.4–5.3)
RBC # BLD AUTO: 2.84 10E6/UL (ref 3.8–5.2)
RBC MORPH BLD: ABNORMAL
WBC # BLD AUTO: 16.2 10E3/UL (ref 4–11)

## 2023-02-20 PROCEDURE — 250N000013 HC RX MED GY IP 250 OP 250 PS 637: Performed by: HOSPITALIST

## 2023-02-20 PROCEDURE — 250N000013 HC RX MED GY IP 250 OP 250 PS 637: Performed by: INTERNAL MEDICINE

## 2023-02-20 PROCEDURE — 97116 GAIT TRAINING THERAPY: CPT | Mod: GP

## 2023-02-20 PROCEDURE — 120N000001 HC R&B MED SURG/OB

## 2023-02-20 PROCEDURE — 85014 HEMATOCRIT: CPT | Performed by: HOSPITALIST

## 2023-02-20 PROCEDURE — 97535 SELF CARE MNGMENT TRAINING: CPT | Mod: GO

## 2023-02-20 PROCEDURE — 99231 SBSQ HOSP IP/OBS SF/LOW 25: CPT | Performed by: INTERNAL MEDICINE

## 2023-02-20 PROCEDURE — 84132 ASSAY OF SERUM POTASSIUM: CPT | Performed by: INTERNAL MEDICINE

## 2023-02-20 PROCEDURE — 97110 THERAPEUTIC EXERCISES: CPT | Mod: GP

## 2023-02-20 PROCEDURE — 250N000013 HC RX MED GY IP 250 OP 250 PS 637: Performed by: NURSE PRACTITIONER

## 2023-02-20 PROCEDURE — 85610 PROTHROMBIN TIME: CPT | Performed by: HOSPITALIST

## 2023-02-20 PROCEDURE — 85007 BL SMEAR W/DIFF WBC COUNT: CPT | Performed by: HOSPITALIST

## 2023-02-20 PROCEDURE — 36415 COLL VENOUS BLD VENIPUNCTURE: CPT | Performed by: HOSPITALIST

## 2023-02-20 RX ORDER — WARFARIN SODIUM 2.5 MG/1
2.5 TABLET ORAL
Status: COMPLETED | OUTPATIENT
Start: 2023-02-20 | End: 2023-02-20

## 2023-02-20 RX ADMIN — NYSTATIN 500000 UNITS: 100000 SUSPENSION ORAL at 17:34

## 2023-02-20 RX ADMIN — ATORVASTATIN CALCIUM 40 MG: 40 TABLET, FILM COATED ORAL at 20:12

## 2023-02-20 RX ADMIN — MULTIPLE VITAMINS W/ MINERALS TAB 1 TABLET: TAB at 09:10

## 2023-02-20 RX ADMIN — NYSTATIN 500000 UNITS: 100000 SUSPENSION ORAL at 09:10

## 2023-02-20 RX ADMIN — PANTOPRAZOLE SODIUM 40 MG: 40 TABLET, DELAYED RELEASE ORAL at 17:34

## 2023-02-20 RX ADMIN — MICONAZOLE NITRATE: 20 CREAM TOPICAL at 20:47

## 2023-02-20 RX ADMIN — GABAPENTIN 100 MG: 100 CAPSULE ORAL at 09:10

## 2023-02-20 RX ADMIN — WARFARIN SODIUM 2.5 MG: 2.5 TABLET ORAL at 17:34

## 2023-02-20 RX ADMIN — Medication 1000 MCG: at 09:10

## 2023-02-20 RX ADMIN — MICONAZOLE NITRATE: 20 CREAM TOPICAL at 14:24

## 2023-02-20 RX ADMIN — DULOXETINE HYDROCHLORIDE 120 MG: 60 CAPSULE, DELAYED RELEASE ORAL at 09:10

## 2023-02-20 RX ADMIN — NYSTATIN 500000 UNITS: 100000 SUSPENSION ORAL at 22:23

## 2023-02-20 RX ADMIN — ARIPIPRAZOLE 5 MG: 5 TABLET ORAL at 09:10

## 2023-02-20 RX ADMIN — NYSTATIN 500000 UNITS: 100000 SUSPENSION ORAL at 14:22

## 2023-02-20 RX ADMIN — GABAPENTIN 100 MG: 100 CAPSULE ORAL at 20:12

## 2023-02-20 RX ADMIN — PANTOPRAZOLE SODIUM 40 MG: 40 TABLET, DELAYED RELEASE ORAL at 06:51

## 2023-02-20 RX ADMIN — GABAPENTIN 100 MG: 100 CAPSULE ORAL at 14:22

## 2023-02-20 RX ADMIN — FOLIC ACID 2 MG: 1 TABLET ORAL at 09:10

## 2023-02-20 ASSESSMENT — ACTIVITIES OF DAILY LIVING (ADL)
ADLS_ACUITY_SCORE: 45
ADLS_ACUITY_SCORE: 49
ADLS_ACUITY_SCORE: 48
ADLS_ACUITY_SCORE: 49
ADLS_ACUITY_SCORE: 49
ADLS_ACUITY_SCORE: 45
ADLS_ACUITY_SCORE: 49
ADLS_ACUITY_SCORE: 48

## 2023-02-20 NOTE — PROGRESS NOTES
Care Management Follow Up    Length of Stay (days): 22    Expected Discharge Date: 02/21/2023     Concerns to be Addressed:       Patient plan of care discussed at interdisciplinary rounds: Yes    Anticipated Discharge Disposition: Transitional Care     Anticipated Discharge Services: None  Anticipated Discharge DME: None    Patient/family educated on Medicare website which has current facility and service quality ratings: yes  Education Provided on the Discharge Plan:    Patient/Family in Agreement with the Plan: yes    Referrals Placed by CM/SW:    Private pay costs discussed: Not applicable    Additional Information:    Patient was accepted at [facility] for discharge tomorrow at 2pm. Writer completed PAS.    PAS-RR    D: Per DHS regulation, SW completed and submitted PAS-RR to [place] [employer] via the Senior [employer] Line.  PAS-RR confirmation # is : [id number]    P: Further questions may be directed to [employer] Line at #[phone], option #4 for PAS-RR staff.      CHARLENE Burns

## 2023-02-20 NOTE — PROGRESS NOTES
[facility]  Hospitalist Progress Note    Date of Admission: 1/28/2023    Interval History   Patient seen at noontime and more awake  Plans for TCU when available.   She has recovered from hospital needed cares.     Assessment & Plan    Shira Rodriguez is a 62-year-old female with rheumatoid arthritis on methotrexate, prior massive PE on warfarin, morbid obesity who presented on 1/28/2023 with severe sepsis.  She was found unresponsive in her home. She  Was hypotensive and had temp of 100.2 en  route to hospital.   Patient presented with a clinical syndrome of sepsis.  She had severe neutropenia/pancytopenia.  Had been treated with methotrexate prior to admission for rheumatoid arthritis: Treated for sepsis and monitored for recovery of her counts.  Work-up for sepsis and pancytopenia     Severe sepsis-present on admission  Candida intertrigo  Neutropenic fever, resolved, now with leukocytosis likely secondary to Neupogen  Lactic acidosis, Resolved     Patient admitted 1/28 with sepsis Lactate of 3.2.  and WBC 0.4. Severe sepsis on admission     Given fluids and responded with no need for ICU/pressors     DDX include panniculitis (significant redness under abd pannus and breasts) or mucositis (severe throat pain x 2 days) and duodenitis with severe neutropenia.    CT C/A/P showed small chronic left pleural effusion, small fluid around distal esophagus and duodenum    TTE 1/30 with possible vegetation; LETA 1/31 negative for vegetation or dissection    repeat CT chest 2/4 (due to cough) with slight increase in bilateral pleural effusions, mild increase in atelectasis      ID consulted with extensive infectious work-up: positive fungitell (significance unclear), positive EBV IgG (but negative IgM and DNA)    negative infectious work-up:  UA and culture, blood cultures, COVID/RSV/flu, Hep B&C, HIV, HSV, CMV, aspergillus, Blastomyces, histoplasmosis, anaplasma, Rickettsia, Ehrlichia      Patient completed 5 day course of doxycycline 2/3 (empiric tick born illness)     Completed 7 days course of fluconazole 2/6  (candida intertrigo)     Cefepime 1/30- 2/8 while neutropenic. >> stopped when counts recovered     Blood cultures 2/7 NGTD X 2    1/29 + Beta D glucan fungitell     2/7 given neupogen     WBC 31,00 s/p neuopen 2/7 2/16 White count 23,000    2/19 WBC 18,000 stabilizing post neuopen         Pancytopenia with severe leukopenia and neutropenia, moderate anemia and thrombocytopenia  Severe vitamin B12 deficiency and folate deficiency    CBC wnl 12/7/2022;     On admission 1/28 - Hb 9.4, WBC 0.4, ANC 0.1, platelets 59    Iron studies 1/29 indicating anemia of chronic disease.  Reticulocyte count suppressed.  B12 undetectable and folate low at 2.8     initiated on B12 and folate, ultimately started on leucovorin rescue 2/2-2/5 per Heme    methotrexate level 1/30 undetectable      Peripheral smear 1/29 showed moderate to marked pancytopenia. Negative for schistocytes and elevated fibrinogen argues against DIC. Negative for circulating blasts on scanning.     flow cytometry shows polytypic B cells, no aberrant immunophenotype on T cells and rare to absent CD34 positive blasts    elevated Kappa and lambda free light chains, monoclonal IgM immunoglobulin of lambda light chain type, IgM is elevated at 395 and IgG and IgA are within normal limits      immunological work-up showed C3 and C4 wnl, ANCA and ds-DNA negative\    s/p bone marrow biopsy on 2/3/2023 with no clear etiology (see report for details) but no current evidence for neoplasm or aplastic anemia - Heme suspects hypocellularity primarily due to methotrexate, B12 and folate deficiency     received 1U PRBC 1/31 and 2/4    received daily platelet transfusions 1/30-2/5 for plt <50K     repeat B12 level 2/8 >4K; will need ongoing outpatient monitoring and monthly B12 injections    received daily Neupogen 1/30-2/7; stopped with recovery of  cell counts    Continue folate.     B12 supplement.     BM cytogenetic studies pending     per Heme; transfuse for plt <20K or signs of bleeding; hgb <7 g/dl    2/19 WBC 18,000, hgb 8.1 and platlet 220     Recovered and sepsis resolved         KRISS on CKD stage 4, resolved  Mild anion gap metabolic acidosis, resolved      Normal renal function in 2021, Cr 1.7 5/2022, more recent baseline creatinine 2-2.2.   Creatinine on admission 3.25.    Nephrology consulted, suspect pre-renal with possible progression to ATN, treated with IVF with gradual improvement    Neph signed off 2/3.     Neph re-consulted 2/8 due to worsening renal function of unclear etiology, no improvement with fluid bolus    Urine eosinophil negative    2/18 creatinine 1.92    STABLE and at baseline     Probable esophagitis  Probable duodenitis  Dysphagia   Mucositis     she has been complaining of throat pain; oral ulcers presents on exam      CT on admission showed small amount of fluid adjacent to distal thoracic esophagus and scattered posterior mediastinal lymph nodes along esophagus of uncertain etiology but could be secondary to esophagitis or other inflammatory process.  CT scan also showed small edema around second and third portion of duodenum.     SLP following, has advanced to reg diet     continue Protonix    odynophagia has resolved and mouth pain improving    Resolved     Panniculitis, improving  Candidal rash-under pannus, breasts, improving  * treated with fluconazole 1/30-2/6 per ID  - Wound care on board , patient getting wound care and received  - continue miconazole cream     Hypokalemia  -Being replaced     Type II MI due to severe sepsis     High-sensitivity troponin mildly elevated at 48 and repeat was normal and can be due to demand     TTE 1/30 with normal LV function without WMA, RV with mildly decreased systolic function     History of massive PE causing PEA arrest, 9/2019    last dose of warfarin was 1/29, but INR has  "remained therapeutic through 2/5.     as above, significant discrepancy between GFR calculation with cystatin c vs Cr; and so was started on heparin gtt bridge to coumadin, INR today 1.65    pharmacy managing Coumadin dose, continue heparin until therapeutic INR    INR therapeutic stopped heparin 2/15    INR daily per pharmacy with dosing     Rheumatoid arthritis   Historically on methotrexate, resumed about 2 weeks prior to admission due to worsening back and shoulder pain.     Hospital provider had sent inbox message to outpatient rheumatologist, Dr. Austin at [facility], on 2/8 and he plans for clinic to contact her to schedule f/u appt within 1 month    -Hold methotrexate, follow up with outpatient rheumatologist     Will need discharging team/provider to follow up on communication so Dr. Austin aware of discharge     COPD  chronic cough  * has chronic cough for years and follows with pulmonary at [facility]  * CXR 2/3 showed mild opacity at the left lung base is new since the previous exam - atelectasis vs pneumonia.   * CT chest 2/4 with findings as above   - no signs of acute exacerbation     Chronic major depression  Chronic pain  * see recent pain clinic visit summary from 1/17/2023 for background information  - Continue Abilify, Cymbalta and Neurontin     Essential hypertension  PTA lisinopril initially held due to KRISS and hypotension, which have resolved.  - remains normotensive at this time, resume as indicated     Morbid obesity, BMI 46  Estimated body mass index is 45.9 kg/m  as calculated from the following:    Height as of this encounter: 1.676 m (5' 6\").    Weight as of this encounter: 129 kg (284 lb 6.3 oz).     Acute metabolic encephalopathy secondary to severe sepsis, resolved  Admission head CT negative for acute pathology.  Returned to baseline mental status with treatment of sepsis.      Mild hypovolemic hyponatremia, resolved  Admission sodium 132, normalized with IVF.     Sodium 142 "      Hypomagnesemia, resolved  * replaced per protocol     3 cm right adrenal nodule  * Noted on CT.  Was present on previous imaging as well.  Likely benign  * A.m. cortisol 1/29, normal at 43.9     Hypoalbuminemia, albumin 2.5     Hyperuricemia  * uric acid 8.9 on 1/29/23      Disposition:     Anticipate TCU     currently working on disposition placement.    TCU placement when available.  Endurance is slightly improving.    Awaiting TCU placement anticipate Monday 2/20    Diet: Combination Diet Regular Diet; Thin Liquids (level 0) (no straws)  Snacks/Supplements Adult: Other; D: magic cup (RD); With Meals  Underwood Catheter: Not present  DVT Prophylaxis: back on coumadin   Code Status: Full Code       Expected Discharge Date: 02/16/2023    Discharge Delays: Placement - TCU           KELVIN LEDEZMA MD,   Hospitalist Service  [facility]  ______________________________________________________________________    -Data reviewed today: I reviewed all new labs and imaging results over the last 24 hours. I personally reviewed no images or EKG's today.    Physical Exam   Temp: 98.2  F (36.8  C) Temp src: Oral BP: 110/64 Pulse: 94   Resp: 16 SpO2: 95 % O2 Device: None (Room air)    Vitals:    02/13/23 1529 02/14/23 0711 02/15/23 0620   Weight: 112.9 kg (249 lb) 112.9 kg (249 lb) 112.9 kg (249 lb)   Constitutional: Appears stated age, no acute distress.  Respiratory: Breath sounds CTA. No increased work of breathing.  Cardiovascular: RRR, no rub or murmur.  GI: Soft, non-tender, non-distended.obesity patient has umbilical hernia.  No concerns of incarceration  Skin: Warm, dry, no rashes or lesions.  Other: Rash in posterior knee with erythema with plaque   Periwick in place       Medications       ARIPiprazole  5 mg Oral Daily     atorvastatin  40 mg Oral QPM     cyanocobalamin  1,000 mcg Sublingual Daily     DULoxetine  120 mg Oral Daily     folic acid  2 mg Oral Daily     gabapentin   100 mg Oral TID     miconazole   Topical BID     multivitamin w/minerals  1 tablet Oral Daily     nystatin  500,000 Units Swish & Spit 4x Daily     pantoprazole  40 mg Oral BID AC     sodium chloride (PF)  3 mL Intracatheter Q8H     Warfarin Therapy Reminder  1 each Oral See Admin Instructions       Data   Recent Labs   Lab 02/16/23  0758 02/15/23  0745 02/14/23  0714 02/13/23  0826 02/12/23  0731   WBC 23.2* 28.3* 31.9* 35.5* 33.9*   HGB 8.1* 8.0* 7.9* 8.0* 8.3*   MCV 95 93 94 92 93    182 160 132* 137*   INR 2.35* 2.02* 1.65* 1.42* 1.33*   NA  --  144 142 145 142   POTASSIUM 3.7 3.9  3.8 3.8 4.0 4.0   CHLORIDE  --  107 107 110* 110*   CO2  --  24 26 26 25   BUN  --  30.1* 31.0* 29.1* 33.7*   CR  --  2.10* 2.30* 2.26* 2.33*   ANIONGAP  --  13 9 9 7   MARIA INES  --  8.6* 8.4* 8.3* 8.2*   GLC  --  85 90 88 101*   ALBUMIN  --   --   --  2.3* 2.3*   PROTTOTAL  --   --   --  5.6* 5.4*   BILITOTAL  --   --   --  0.2 <0.2   ALKPHOS  --   --   --  82 83   ALT  --   --   --  21 20   AST  --   --   --  41* 43*       Imaging:  No results found for this or any previous visit (from the past 24 hour(s)).

## 2023-02-20 NOTE — PROGRESS NOTES
[facility]  Hospitalist Progress Note    Date of Admission: 1/28/2023    Interval History   Plans for TCU when available.   She has recovered from hospital needed cares.   Assist of 1 staff for transfer  Awake and alert.    Assessment & Plan    Shira Rodriguez is a 62-year-old female with rheumatoid arthritis on methotrexate, prior massive PE on warfarin, morbid obesity who presented on 1/28/2023 with severe sepsis.  She was found unresponsive in her home. She  Was hypotensive and had temp of 100.2 en  route to hospital.     Patient presented with a clinical syndrome of sepsis.  She had severe neutropenia/pancytopenia.  Had been treated with methotrexate prior to admission for rheumatoid arthritis: Treated for sepsis and monitored for recovery of her counts.  Work-up for sepsis and pancytopenia     Severe sepsis-present on admission  Candida intertrigo  Neutropenic fever, resolved, now with leukocytosis likely secondary to Neupogen  Lactic acidosis, Resolved     Patient admitted 1/28 with sepsis Lactate of 3.2.  and WBC 0.4. Severe sepsis on admission     Given fluids and responded with no need for ICU/pressors     DDX include panniculitis (significant redness under abd pannus and breasts) or mucositis (severe throat pain x 2 days) and duodenitis with severe neutropenia.    CT C/A/P showed small chronic left pleural effusion, small fluid around distal esophagus and duodenum    TTE 1/30 with possible vegetation; LETA 1/31 negative for vegetation or dissection    repeat CT chest 2/4 (due to cough) with slight increase in bilateral pleural effusions, mild increase in atelectasis      ID consulted with extensive infectious work-up: positive fungitell (significance unclear), positive EBV IgG (but negative IgM and DNA)    negative infectious work-up:  UA and culture, blood cultures, COVID/RSV/flu, Hep B&C, HIV, HSV, CMV, aspergillus, Blastomyces, histoplasmosis, anaplasma, Rickettsia,  Ehrlichia     Patient completed 5 day course of doxycycline 2/3 (empiric tick born illness)     Completed 7 days course of fluconazole 2/6  (candida intertrigo)     Cefepime 1/30- 2/8 while neutropenic. >> stopped when counts recovered     Blood cultures 2/7 NGTD X 2    1/29 + Beta D glucan fungitell     2/7 given neupogen     WBC 31,00 s/p neuopen 2/7 2/16 White count 23,000    2/19 WBC 18,000 stabilizing post neuopen         Pancytopenia with severe leukopenia and neutropenia, moderate anemia and thrombocytopenia  Severe vitamin B12 deficiency and folate deficiency    CBC wnl 12/7/2022;     On admission 1/28 - Hb 9.4, WBC 0.4, ANC 0.1, platelets 59    Iron studies 1/29 indicating anemia of chronic disease.  Reticulocyte count suppressed.  B12 undetectable and folate low at 2.8     initiated on B12 and folate, ultimately started on leucovorin rescue 2/2-2/5 per Heme    methotrexate level 1/30 undetectable      Peripheral smear 1/29 showed moderate to marked pancytopenia. Negative for schistocytes and elevated fibrinogen argues against DIC. Negative for circulating blasts on scanning.     flow cytometry shows polytypic B cells, no aberrant immunophenotype on T cells and rare to absent CD34 positive blasts    elevated Kappa and lambda free light chains, monoclonal IgM immunoglobulin of lambda light chain type, IgM is elevated at 395 and IgG and IgA are within normal limits      immunological work-up showed C3 and C4 wnl, ANCA and ds-DNA negative\    s/p bone marrow biopsy on 2/3/2023 with no clear etiology (see report for details) but no current evidence for neoplasm or aplastic anemia - Heme suspects hypocellularity primarily due to methotrexate, B12 and folate deficiency     received 1U PRBC 1/31 and 2/4    received daily platelet transfusions 1/30-2/5 for plt <50K     repeat B12 level 2/8 >4K; will need ongoing outpatient monitoring and monthly B12 injections    received daily Neupogen 1/30-2/7; stopped with  recovery of cell counts    Continue folate.     B12 supplement.     BM cytogenetic studies pending     per Heme; transfuse for plt <20K or signs of bleeding; hgb <7 g/dl    2/19 WBC 18,000, hgb 8.1 and platlet 220     Recovered and sepsis resolved         KRISS on CKD stage 4, resolved  Mild anion gap metabolic acidosis, resolved      Normal renal function in 2021, Cr 1.7 5/2022, more recent baseline creatinine 2-2.2.   Creatinine on admission 3.25.    Nephrology consulted, suspect pre-renal with possible progression to ATN, treated with IVF with gradual improvement    Neph signed off 2/3.     Neph re-consulted 2/8 due to worsening renal function of unclear etiology, no improvement with fluid bolus    Urine eosinophil negative    2/18 creatinine 1.92    STABLE and at baseline     Probable esophagitis  Probable duodenitis  Dysphagia   Mucositis     she has been complaining of throat pain; oral ulcers presents on exam      CT on admission showed small amount of fluid adjacent to distal thoracic esophagus and scattered posterior mediastinal lymph nodes along esophagus of uncertain etiology but could be secondary to esophagitis or other inflammatory process.  CT scan also showed small edema around second and third portion of duodenum.     SLP following, has advanced to reg diet     continue Protonix    odynophagia has resolved and mouth pain improving    Resolved     Panniculitis, improving  Candidal rash-under pannus, breasts, improving  * treated with fluconazole 1/30-2/6 per ID  - Wound care on board , patient getting wound care and received  - continue miconazole cream     Hypokalemia  -Being replaced     Type II MI due to severe sepsis     High-sensitivity troponin mildly elevated at 48 and repeat was normal and can be due to demand     TTE 1/30 with normal LV function without WMA, RV with mildly decreased systolic function     History of massive PE causing PEA arrest, 9/2019    last dose of warfarin was 1/29, but  "INR has remained therapeutic through 2/5.     as above, significant discrepancy between GFR calculation with cystatin c vs Cr; and so was started on heparin gtt bridge to coumadin, INR today 1.65    pharmacy managing Coumadin dose, continue heparin until therapeutic INR    INR therapeutic stopped heparin 2/15    INR daily per pharmacy with dosing     Rheumatoid arthritis   Historically on methotrexate, resumed about 2 weeks prior to admission due to worsening back and shoulder pain.     Hospital provider had sent inbox message to outpatient rheumatologist, Dr. Austin at [facility], on 2/8 and he plans for clinic to contact her to schedule f/u appt within 1 month    -Hold methotrexate, follow up with outpatient rheumatologist     Will need discharging team/provider to follow up on communication so Dr. Austin aware of discharge     COPD  chronic cough  * has chronic cough for years and follows with pulmonary at [facility]  * CXR 2/3 showed mild opacity at the left lung base is new since the previous exam - atelectasis vs pneumonia.   * CT chest 2/4 with findings as above   - no signs of acute exacerbation     Chronic major depression  Chronic pain  * see recent pain clinic visit summary from 1/17/2023 for background information  - Continue Abilify, Cymbalta and Neurontin     Essential hypertension  PTA lisinopril initially held due to KRISS and hypotension, which have resolved.  - remains normotensive at this time, resume as indicated     Morbid obesity, BMI 46  Estimated body mass index is 45.9 kg/m  as calculated from the following:    Height as of this encounter: 1.676 m (5' 6\").    Weight as of this encounter: 129 kg (284 lb 6.3 oz).     Acute metabolic encephalopathy secondary to severe sepsis, resolved  Admission head CT negative for acute pathology.  Returned to baseline mental status with treatment of sepsis.      Mild hypovolemic hyponatremia, resolved  Admission sodium 132, normalized with IVF.     Sodium 142 "      Hypomagnesemia, resolved  * replaced per protocol     3 cm right adrenal nodule  * Noted on CT.  Was present on previous imaging as well.  Likely benign  * A.m. cortisol 1/29, normal at 43.9     Hypoalbuminemia, albumin 2.5     Hyperuricemia  * uric acid 8.9 on 1/29/23      Disposition:     Anticipate TCU     currently working on disposition placement.    TCU placement when available.  Endurance is slightly improving.    Awaiting TCU placement anticipate Monday 2/20    Diet: Combination Diet Regular Diet; Thin Liquids (level 0) (no straws)  Snacks/Supplements Adult: Other; D: magic cup (RD); With Meals  Underwood Catheter: Not present  DVT Prophylaxis: back on coumadin   Code Status: Full Code       Expected Discharge Date: 02/16/2023    Discharge Delays: Placement - TCU           KELVIN LEDEZMA MD,   Hospitalist Service  [facility]  ______________________________________________________________________    -Data reviewed today: I reviewed all new labs and imaging results over the last 24 hours. I personally reviewed no images or EKG's today.    Physical Exam   Temp: 98.2  F (36.8  C) Temp src: Oral BP: 110/64 Pulse: 94   Resp: 16 SpO2: 95 % O2 Device: None (Room air)    Vitals:    02/13/23 1529 02/14/23 0711 02/15/23 0620   Weight: 112.9 kg (249 lb) 112.9 kg (249 lb) 112.9 kg (249 lb)   Constitutional: Appears stated age, no acute distress.  Respiratory: Breath sounds CTA. No increased work of breathing.  Cardiovascular: RRR, no rub or murmur.  GI: Soft, non-tender, non-distended.obesity patient has umbilical hernia.  No concerns of incarceration  Skin: Warm, dry, no rashes or lesions.  Other: Rash in posterior knee with erythema with plaque   Periwick in place       Medications       ARIPiprazole  5 mg Oral Daily     atorvastatin  40 mg Oral QPM     cyanocobalamin  1,000 mcg Sublingual Daily     DULoxetine  120 mg Oral Daily     folic acid  2 mg Oral Daily     gabapentin   100 mg Oral TID     miconazole   Topical BID     multivitamin w/minerals  1 tablet Oral Daily     nystatin  500,000 Units Swish & Spit 4x Daily     pantoprazole  40 mg Oral BID AC     sodium chloride (PF)  3 mL Intracatheter Q8H     Warfarin Therapy Reminder  1 each Oral See Admin Instructions       Data   Recent Labs   Lab 02/16/23  0758 02/15/23  0745 02/14/23  0714 02/13/23  0826 02/12/23  0731   WBC 23.2* 28.3* 31.9* 35.5* 33.9*   HGB 8.1* 8.0* 7.9* 8.0* 8.3*   MCV 95 93 94 92 93    182 160 132* 137*   INR 2.35* 2.02* 1.65* 1.42* 1.33*   NA  --  144 142 145 142   POTASSIUM 3.7 3.9  3.8 3.8 4.0 4.0   CHLORIDE  --  107 107 110* 110*   CO2  --  24 26 26 25   BUN  --  30.1* 31.0* 29.1* 33.7*   CR  --  2.10* 2.30* 2.26* 2.33*   ANIONGAP  --  13 9 9 7   MARIA INES  --  8.6* 8.4* 8.3* 8.2*   GLC  --  85 90 88 101*   ALBUMIN  --   --   --  2.3* 2.3*   PROTTOTAL  --   --   --  5.6* 5.4*   BILITOTAL  --   --   --  0.2 <0.2   ALKPHOS  --   --   --  82 83   ALT  --   --   --  21 20   AST  --   --   --  41* 43*       Imaging:  No results found for this or any previous visit (from the past 24 hour(s)).

## 2023-02-20 NOTE — PLAN OF CARE
Shift Note 02/20/23 [id number]      Pt A/Ox4, VSS on RA. Denies pain, N/V. Reg diet, tolerating well. SBA. No PIV access, MD aware. Cont B/B, LBM 2/19. Takes pills whole w/ H2O. Rash under pannus & breasts, wound cares completed. Mepi on bottom replaced. On K protocols, WNL, redraw ordered for 0600. SW, CC, WOC, Hem/Onc, Neph, & OT following. Discharge to [facility] on 2/21 at 1400.

## 2023-02-20 NOTE — TELEPHONE ENCOUNTER
Patient called to schedule a hospital follow  Up visit.  She states she is still in the hospital and will hopefully discharge today, 2/20.  She will be discharging to a TCU.  Patient advised to call with an update once she is discharged.  She is scheduled 3/16/ at 3pm.    Arina Allison RN    Received: 1 week ago  Everett Austin MD Bechard, Paul, MD; Arina Allison RN  Thank you for your good care, Kirby. We will be in contact with patient to set up a followup appointment within a month at [facility] or at [facility].           Previous Messages     ----- Message -----   From: Kirby Sahni MD   Sent: 2/8/2023   9:00 AM CST   To: Everett Austin MD   Subject: Inpatient admission, rheum follow-up             Hi Dr. Austin,     I am a hospitalist at [facility] and am currently caring for your patient, Shira Rodriguez.  I am primarily writing to notify you of her admission (neutropenic fever and pancytopenia; now resolving) as we suspect it was a complication of her methotrexate (though from chart review it is not clear to me that her resumption on this was at the direction of rheumatology clinic).       She is reporting rheumatic pain is at baseline with no acute worsening to suggest flare, but sister reports her baseline pain is severely debilitating, and was hoping to get her in to clinic to see you soon, as we do not have rheumatology services at [facility].  Clearly, her obesity could be playing a significant factor in her joint pain and [facility].     If there is anything you would like done from laboratory testing standpoint, I would be happy to do so, but primarily just wanted to notify you of her admission and needed follow up for ongoing management of her joint disease.     Thanks,   Kirby Sahni   [phone] (call or text is fine)

## 2023-02-20 NOTE — DISCHARGE INSTRUCTIONS
Breast folds and pannus fold, gluteal fold, right posterior knee fold every day   Fill folds with Vashe #[id number] moistened kerlix (likely need 4 rolls) x 15 minutes  Apply barrier cream      Right lower abdomen hernia - daily   Using a q tip tuck vashe moistened opened 2x2 in the undermining from 6 to 9 oclock x 15 minutes  Using a q tip fill that area with a small strip of remy/promogram piece #[id number]  No need to cover unless draining        Right knee and left lateral ankle/foot - apply sween cream 24 daily

## 2023-02-20 NOTE — PLAN OF CARE
Goal Outcome Evaluation: [id number]    A&O x4, pleasant. VSS on RA. SBA to bedside commode. No PIV.     Discharge pending TCU placement.

## 2023-02-21 ENCOUNTER — DOCUMENTATION ONLY (OUTPATIENT)
Dept: ANTICOAGULATION | Facility: CLINIC | Age: 63
End: 2023-02-21
Payer: COMMERCIAL

## 2023-02-21 ENCOUNTER — LAB REQUISITION (OUTPATIENT)
Dept: LAB | Facility: CLINIC | Age: 63
End: 2023-02-21

## 2023-02-21 VITALS
TEMPERATURE: 97.6 F | OXYGEN SATURATION: 97 % | SYSTOLIC BLOOD PRESSURE: 145 MMHG | WEIGHT: 264.8 LBS | HEART RATE: 78 BPM | HEIGHT: 66 IN | BODY MASS INDEX: 42.56 KG/M2 | DIASTOLIC BLOOD PRESSURE: 87 MMHG | RESPIRATION RATE: 18 BRPM

## 2023-02-21 DIAGNOSIS — Z11.1 ENCOUNTER FOR SCREENING FOR RESPIRATORY TUBERCULOSIS: ICD-10-CM

## 2023-02-21 DIAGNOSIS — I26.99 PULMONARY EMBOLI (H): Primary | ICD-10-CM

## 2023-02-21 DIAGNOSIS — Z79.01 LONG TERM CURRENT USE OF ANTICOAGULANT THERAPY: ICD-10-CM

## 2023-02-21 LAB
BASOPHILS # BLD MANUAL: 0.7 10E3/UL (ref 0–0.2)
BASOPHILS NFR BLD MANUAL: 5 %
ELLIPTOCYTES BLD QL SMEAR: SLIGHT
EOSINOPHIL # BLD MANUAL: 0 10E3/UL (ref 0–0.7)
EOSINOPHIL NFR BLD MANUAL: 0 %
ERYTHROCYTE [DISTWIDTH] IN BLOOD BY AUTOMATED COUNT: 17.4 % (ref 10–15)
HCT VFR BLD AUTO: 24.2 % (ref 35–47)
HGB BLD-MCNC: 7.6 G/DL (ref 11.7–15.7)
INR PPP: 2.48 (ref 0.85–1.15)
LYMPHOCYTES # BLD MANUAL: 1.5 10E3/UL (ref 0.8–5.3)
LYMPHOCYTES NFR BLD MANUAL: 10 %
MCH RBC QN AUTO: 29.2 PG (ref 26.5–33)
MCHC RBC AUTO-ENTMCNC: 31.4 G/DL (ref 31.5–36.5)
MCV RBC AUTO: 93 FL (ref 78–100)
METAMYELOCYTES # BLD MANUAL: 0.4 10E3/UL
METAMYELOCYTES NFR BLD MANUAL: 3 %
MONOCYTES # BLD MANUAL: 0.6 10E3/UL (ref 0–1.3)
MONOCYTES NFR BLD MANUAL: 4 %
MYELOCYTES # BLD MANUAL: 0.1 10E3/UL
MYELOCYTES NFR BLD MANUAL: 1 %
NEUTROPHILS # BLD MANUAL: 11.4 10E3/UL (ref 1.6–8.3)
NEUTROPHILS NFR BLD MANUAL: 77 %
PLAT MORPH BLD: ABNORMAL
PLATELET # BLD AUTO: 269 10E3/UL (ref 150–450)
POLYCHROMASIA BLD QL SMEAR: SLIGHT
POTASSIUM SERPL-SCNC: 4 MMOL/L (ref 3.4–5.3)
RBC # BLD AUTO: 2.6 10E6/UL (ref 3.8–5.2)
RBC MORPH BLD: ABNORMAL
WBC # BLD AUTO: 14.8 10E3/UL (ref 4–11)

## 2023-02-21 PROCEDURE — 250N000013 HC RX MED GY IP 250 OP 250 PS 637: Performed by: INTERNAL MEDICINE

## 2023-02-21 PROCEDURE — 85014 HEMATOCRIT: CPT | Performed by: HOSPITALIST

## 2023-02-21 PROCEDURE — 85007 BL SMEAR W/DIFF WBC COUNT: CPT | Performed by: HOSPITALIST

## 2023-02-21 PROCEDURE — 84132 ASSAY OF SERUM POTASSIUM: CPT | Performed by: INTERNAL MEDICINE

## 2023-02-21 PROCEDURE — 250N000013 HC RX MED GY IP 250 OP 250 PS 637: Performed by: HOSPITALIST

## 2023-02-21 PROCEDURE — 99239 HOSP IP/OBS DSCHRG MGMT >30: CPT | Performed by: INTERNAL MEDICINE

## 2023-02-21 PROCEDURE — 36415 COLL VENOUS BLD VENIPUNCTURE: CPT | Performed by: HOSPITALIST

## 2023-02-21 PROCEDURE — 85610 PROTHROMBIN TIME: CPT | Performed by: HOSPITALIST

## 2023-02-21 RX ORDER — FOLIC ACID 1 MG/1
2 TABLET ORAL DAILY
DISCHARGE
Start: 2023-02-22 | End: 2023-03-15

## 2023-02-21 RX ORDER — MICONAZOLE NITRATE 20 MG/G
CREAM TOPICAL 2 TIMES DAILY
DISCHARGE
Start: 2023-02-21 | End: 2023-10-26

## 2023-02-21 RX ORDER — LANOLIN ALCOHOL/MO/W.PET/CERES
3 CREAM (GRAM) TOPICAL
DISCHARGE
Start: 2023-02-21 | End: 2024-01-31

## 2023-02-21 RX ORDER — WARFARIN SODIUM 2.5 MG/1
2.5 TABLET ORAL DAILY
DISCHARGE
Start: 2023-02-21 | End: 2023-03-29

## 2023-02-21 RX ORDER — ATORVASTATIN CALCIUM 40 MG/1
40 TABLET, FILM COATED ORAL EVERY EVENING
DISCHARGE
Start: 2023-02-21 | End: 2023-03-09

## 2023-02-21 RX ORDER — POLYETHYLENE GLYCOL 3350 17 G/17G
17 POWDER, FOR SOLUTION ORAL DAILY
Qty: 510 G | DISCHARGE
Start: 2023-02-21 | End: 2023-05-24

## 2023-02-21 RX ORDER — AMOXICILLIN 250 MG
1 CAPSULE ORAL 2 TIMES DAILY PRN
DISCHARGE
Start: 2023-02-21 | End: 2023-03-15

## 2023-02-21 RX ORDER — WARFARIN SODIUM 2.5 MG/1
2.5 TABLET ORAL
Status: DISCONTINUED | OUTPATIENT
Start: 2023-02-21 | End: 2023-02-21 | Stop reason: HOSPADM

## 2023-02-21 RX ORDER — MULTIPLE VITAMINS W/ MINERALS TAB 9MG-400MCG
1 TAB ORAL DAILY
DISCHARGE
Start: 2023-02-22 | End: 2023-03-15

## 2023-02-21 RX ORDER — PANTOPRAZOLE SODIUM 40 MG/1
40 TABLET, DELAYED RELEASE ORAL
DISCHARGE
Start: 2023-02-21 | End: 2023-10-26

## 2023-02-21 RX ADMIN — MULTIPLE VITAMINS W/ MINERALS TAB 1 TABLET: TAB at 09:02

## 2023-02-21 RX ADMIN — MICONAZOLE NITRATE: 20 CREAM TOPICAL at 13:21

## 2023-02-21 RX ADMIN — ARIPIPRAZOLE 5 MG: 5 TABLET ORAL at 09:02

## 2023-02-21 RX ADMIN — Medication 1000 MCG: at 09:02

## 2023-02-21 RX ADMIN — GABAPENTIN 100 MG: 100 CAPSULE ORAL at 09:02

## 2023-02-21 RX ADMIN — NYSTATIN 500000 UNITS: 100000 SUSPENSION ORAL at 09:01

## 2023-02-21 RX ADMIN — DULOXETINE HYDROCHLORIDE 120 MG: 60 CAPSULE, DELAYED RELEASE ORAL at 09:02

## 2023-02-21 RX ADMIN — FOLIC ACID 1 MG: 1 TABLET ORAL at 09:09

## 2023-02-21 RX ADMIN — PANTOPRAZOLE SODIUM 40 MG: 40 TABLET, DELAYED RELEASE ORAL at 06:48

## 2023-02-21 RX ADMIN — FOLIC ACID 1 MG: 1 TABLET ORAL at 09:02

## 2023-02-21 RX ADMIN — GABAPENTIN 100 MG: 100 CAPSULE ORAL at 13:21

## 2023-02-21 ASSESSMENT — ACTIVITIES OF DAILY LIVING (ADL)
ADLS_ACUITY_SCORE: 51
ADLS_ACUITY_SCORE: 48
ADLS_ACUITY_SCORE: 48
ADLS_ACUITY_SCORE: 51
ADLS_ACUITY_SCORE: 48
ADLS_ACUITY_SCORE: 49
ADLS_ACUITY_SCORE: 50

## 2023-02-21 NOTE — DISCHARGE SUMMARY
[facility]  Hospitalist Discharge Summary      Date of Admission:  1/28/2023  Date of Discharge:  2/21/2023  2:13 PM  Discharging Provider: Chritsiano Zhu MD  Discharge   Service: Hospitalist Service    Discharge Diagnoses   Severe sepsis, resolved  Candida intertrigo, resolved  Neutropenia, resolved,   leukocytosis likely secondary to Neupogen  Lactic acidosis, Resolved   KRISS on CKD stage 3A, resolved     Follow-ups Needed After Discharge   Follow-up Appointments     Follow-up and recommended labs and tests       You are scheduled to see Dr. Hudson, Nephrologist at [facility]   on March 27th at 1:00 PM for an after hospital stay follow-up.          Follow up with PCP within 1 week from discharge.    Unresulted Labs Ordered in the Past 30 Days of this Admission     Date and Time Order Name Status Description    2/1/2023 11:52 PM CHROMOSOME ANALYSIS, BONE MARROW, DIAGNOSIS/RELAPSE With Professional Interpretation In process       These results will be followed up by PCP    Discharge Disposition   Discharged to nursing home  Condition at discharge: Stable  Patient ready to discharge to a skilled nursing facility as soon as possible in order to create capacity for patients related to the COVID-19 pandemic.    Hospital Course    Shira Rodriguez is a 62-year-old female with rheumatoid arthritis on methotrexate, prior massive PE on warfarin, morbid obesity who presented on 1/28/2023 with severe sepsis.  She was found unresponsive in her home. She was hypotensive and had temp of 100.2 en route to hospital.      Patient presented with a clinical syndrome of sepsis.  She had severe neutropenia/pancytopenia. She had been treated with methotrexate prior to admission for rheumatoid arthritis: Treated for sepsis with empiric antibiotics. Source of sepsis include panniculitis (significant redness under abd pannus and breasts) or mucositis (severe throat pain) and duodenitis with severe  neutropenia. She received Neupogen with recovery of her white blood cell counts rather developed leukocytosis which was gradually down trending attributed to neupogen. Pancytopenia attributed to methotrexate use, as well as severe B12 and folate deficiency per hematology. She had bone marrow aspirate which was essential negative for malignancy, myelodysplastic disorder or aplastic anemia. She was receiving folate and B12 supplementations as well. Methotrexate was discontinued indefinitely.    She was dehydrated on presentation with hyponatremia and prerenal acute renal injury on CKD stage 4 which resolved with IV fluid hydration. She was treated for mucocutaneous candidiasis with nystatin swish and swallow and miconazole cream with resolution of symptoms. She was on coumadin for prior thromboembolism disorder (PE) and INR have been therapeutic with any bleeding complications. She has chronic anemia attributed to chronic kidney disease and worsen by use of methotrexate. Hemoglobin remains in the range of 7.5-8.5 g/dl.      Prolonged hospital stay resulting from profound deconditioning requiring carol lift and > 3 person staff assist with transfer from bed to wheelchair or chair and vice versa. Many facilities have been declining due to that. Fortunately with the assistance of our physical therapy staff she improved significantly with therapy. She was accepted by [facility] nursing facility since she was requiring 1 person assistance for transfer on 2/20/23.vPatient will be discharging at 2pm on 2/21/23 to [facility].       For problem oriented details:      Severe sepsis-present on admission  Candida intertrigo  Neutropenic fever, resolved, now with leukocytosis likely secondary to Neupogen  Lactic acidosis, Resolved     Patient admitted 1/28 with sepsis Lactate of 3.2.  and WBC 0.4. Severe sepsis on admission     Given fluids and responded with no need for ICU/pressors     DDX include  panniculitis (significant redness under abd pannus and breasts) or mucositis (severe throat pain x 2 days) and duodenitis with severe neutropenia.    CT C/A/P showed small chronic left pleural effusion, small fluid around distal esophagus and duodenum    TTE 1/30 with possible vegetation; LETA 1/31 negative for vegetation or dissection    repeat CT chest 2/4 (due to cough) with slight increase in bilateral pleural effusions, mild increase in atelectasis      ID consulted with extensive infectious work-up: positive fungitell (significance unclear), positive EBV IgG (but negative IgM and DNA)    negative infectious work-up:  UA and culture, blood cultures, COVID/RSV/flu, Hep B&C, HIV, HSV, CMV, aspergillus, Blastomyces, histoplasmosis, anaplasma, Rickettsia, Ehrlichia     Patient completed 5 day course of doxycycline 2/3 (empiric tick born illness)     Completed 7 days course of fluconazole 2/6  (candida intertrigo)     Cefepime 1/30- 2/8 while neutropenic. >> stopped when counts recovered     Blood cultures 2/7 NGTD X 2    1/29 + Beta D glucan fungitell     2/7 given neupogen     WBC 31,00 s/p neuopen 2/7 2/18 White count 18,000    2/21 WBC 14,000 stabilizing post neuopen         Pancytopenia with severe leukopenia and neutropenia, moderate anemia and thrombocytopenia  Severe vitamin B12 deficiency and folate deficiency    CBC wnl 12/7/2022;     On admission 1/28 - Hb 9.4, WBC 0.4, ANC 0.1, platelets 59    Iron studies 1/29 indicating anemia of chronic disease.  Reticulocyte count suppressed.  B12 undetectable and folate low at 2.8     initiated on B12 and folate, ultimately started on leucovorin rescue 2/2-2/5 per Heme    methotrexate level 1/30 undetectable      Peripheral smear 1/29 showed moderate to marked pancytopenia. Negative for schistocytes and elevated fibrinogen argues against DIC. Negative for circulating blasts on scanning.     flow cytometry shows polytypic B cells, no aberrant immunophenotype on T  cells and rare to absent CD34 positive blasts    elevated Kappa and lambda free light chains, monoclonal IgM immunoglobulin of lambda light chain type, IgM is elevated at 395 and IgG and IgA are within normal limits      immunological work-up showed C3 and C4 wnl, ANCA and ds-DNA negative\    s/p bone marrow biopsy on 2/3/2023 with no clear etiology (see report for details) but no current evidence for neoplasm or aplastic anemia - Heme suspects hypocellularity primarily due to methotrexate, B12 and folate deficiency     received 1U PRBC 1/31 and 2/4    received daily platelet transfusions 1/30-2/5 for plt <50K     repeat B12 level 2/8 >4K; will need ongoing outpatient monitoring and monthly B12 injections    received daily Neupogen 1/30-2/7; stopped with recovery of cell counts    Continue folate.     B12 supplement.     BM cytogenetic studies pending     per Heme; transfuse for plt <20K or signs of bleeding; hgb <7 g/dl    2/19 WBC 18,000, hgb 8.1 and platlet 220     Recovered and sepsis resolved         KRISS on CKD stage 4, resolved  Mild anion gap metabolic acidosis, resolved      Normal renal function in 2021, Cr 1.7 5/2022, more recent baseline creatinine 2-2.2.   Creatinine on admission 3.25.    Nephrology consulted, suspect pre-renal with possible progression to ATN, treated with IVF with gradual improvement    Neph signed off 2/3.     Neph re-consulted 2/8 due to worsening renal function of unclear etiology, no improvement with fluid bolus    Urine eosinophil negative    2/18 creatinine 1.92    STABLE and at baseline      Probable esophagitis  Probable duodenitis  Dysphagia   Mucositis     she has been complaining of throat pain; oral ulcers presents on exam      CT on admission showed small amount of fluid adjacent to distal thoracic esophagus and scattered posterior mediastinal lymph nodes along esophagus of uncertain etiology but could be secondary to esophagitis or other inflammatory process.  CT scan  also showed small edema around second and third portion of duodenum.     SLP following, has advanced to reg diet     continue Protonix    odynophagia has resolved and mouth pain improving    Resolved     Panniculitis, improving  Candidal rash-under pannus, breasts, improving  * treated with fluconazole 1/30-2/6 per ID  - Wound care on board , patient getting wound care and received  - continue miconazole cream     Hypokalemia  -Being replaced     Type II MI due to severe sepsis     High-sensitivity troponin mildly elevated at 48 and repeat was normal and can be due to demand     TTE 1/30 with normal LV function without WMA, RV with mildly decreased systolic function     History of massive PE causing PEA arrest, 9/2019    last dose of warfarin was 1/29, but INR has remained therapeutic through 2/5.     as above, significant discrepancy between GFR calculation with cystatin c vs Cr; and so was started on heparin gtt bridge to coumadin, INR today 1.65    pharmacy managing Coumadin dose, continue heparin until therapeutic INR    INR therapeutic stopped heparin 2/15    INR daily per pharmacy with dosing     Rheumatoid arthritis   Historically on methotrexate, resumed about 2 weeks prior to admission due to worsening back and shoulder pain.     Hospital provider had sent inbox message to outpatient rheumatologist, Dr. Austin at [facility], on 2/8 and he plans for clinic to contact her to schedule f/u appt within 1 month    -Hold methotrexate, follow up with outpatient rheumatologist     Will need discharging team/provider to follow up on communication so Dr. Austin aware of discharge      COPD  chronic cough  * has chronic cough for years and follows with pulmonary at [facility]  * CXR 2/3 showed mild opacity at the left lung base is new since the previous exam - atelectasis vs pneumonia.   * CT chest 2/4 with findings as above   - no signs of acute exacerbation     Chronic major depression  Chronic pain  * see recent pain clinic  "visit summary from 1/17/2023 for background information  - Continue Abilify, Cymbalta and Neurontin     Essential hypertension  PTA lisinopril initially held due to KRISS and hypotension, which have resolved.  - remains normotensive at this time, resume as indicated     Morbid obesity, BMI 46  Estimated body mass index is 45.9 kg/m  as calculated from the following:    Height as of this encounter: 1.676 m (5' 6\").    Weight as of this encounter: 129 kg (284 lb 6.3 oz).     Acute metabolic encephalopathy secondary to severe sepsis, resolved  Admission head CT negative for acute pathology.  Returned to baseline mental status with treatment of sepsis.      Mild hypovolemic hyponatremia, resolved  Admission sodium 132, normalized with IVF.     Sodium 142      Hypomagnesemia, resolved  * replaced per protocol     3 cm right adrenal nodule  * Noted on CT.  Was present on previous imaging as well.  Likely benign  * A.m. cortisol 1/29, normal at 43.9     Hypoalbuminemia, albumin 2.5     Hyperuricemia  * uric acid 8.9 on 1/29/23      Disposition:     Anticipate TCU     currently working on disposition placement.    TCU placement when available.  Endurance is slightly improving.    Awaiting TCU placement anticipate Monday 2/20     Diet: Combination Diet Regular Diet; Thin Liquids (level 0) (no straws)  Snacks/Supplements Adult: Other; D: magic cup (RD); With Meals  Underwood Catheter: Not present  DVT Prophylaxis: back on coumadin   Code Status: Full Code    Consultations This Hospital Stay   PHARMACY TO DOSE VANCO  INFECTIOUS DISEASES IP CONSULT  HEMATOLOGY & ONCOLOGY IP CONSULT  NEPHROLOGY IP CONSULT  PHARMACY TO DOSE WARFARIN  PHARMACY TO DOSE VANCO  WOUND OSTOMY CONTINENCE NURSE  IP CONSULT  SOCIAL WORK IP CONSULT  CARDIOLOGY IP CONSULT  SPEECH LANGUAGE PATH ADULT IP CONSULT  PHYSICAL THERAPY ADULT IP CONSULT  OCCUPATIONAL THERAPY ADULT IP CONSULT  VASCULAR ACCESS ADULT IP CONSULT  CARE MANAGEMENT / SOCIAL WORK " IP CONSULT  NEPHROLOGY IP CONSULT  PHARMACY IP CONSULT  PHARMACY IP CONSULT  PHARMACY TO DOSE WARFARIN  CARE MANAGEMENT / SOCIAL WORK IP CONSULT  PHARMACY IP CONSULT    Code Status   Full Code    Time Spent on this Encounter   I, Christiano Zhu MD, personally saw the patient today and spent greater than 30 minutes discharging this patient.       Christiano Zhu MD  [place] [id number] ONCOLOGY  [facility] [address]  [place] [address]  Phone: [phone]  ______________________________________________________________________    Physical Exam   Vital Signs: Temp: 97.6  F (36.4  C) Temp src: Oral BP: (!) 145/87 Pulse: 78   Resp: 18 SpO2: 97 % O2 Device: None (Room air)    Weight: 264 lbs 12.8 oz  General: AO X 3, lying comfortably in bed in no apparent distress  HEENT: pupils equal and reactive to light and accommodation, extraocular movements are intact; oral mucosa moist  Neck: Supple no raised JVD, no rigidity  Respiratory: lungs b/l clear to auscultation, no wheezing or crepitations  CVS: nl s1 s2, regular rate and rhythm, no murmur  P/A: soft, nt,nd, no guarding rigidity or rebound tenderness  Ext: bilateral lower ext edema  Neuro: no focal neurological deficits noted, cranial nerves 2-12 grossly intact  Psychiatry: normal mood and affect  Skin: No obvious skin rashes or ulcers         Primary Care Physician   Anjel Busby    Discharge Orders      No driving or operating machinery    No driving or operating machinery until the day after bone marrow biopsy.     No Alcohol    For 24 hours post procedure     Diet Instructions    Resume pre-procedure diet and drink plenty of fluids.  If you received sedation, you may feel a little nauseated, so start with a clear liquid diet until the nausea passes.     Activity    Relax and take it easy for 24 hours.  Resume regular activity after 24 hours.     Shower    No shower for 24 hours post procedure. May shower Postoperative Day 1.     Do not  immerse bone  marrow biopsy puncture site in water    Do not immerse puncture site in water.  Do not take a bath, sit in a hot tub, spa or swim until the puncture site has healed.     Drainage    Drainage should be minimal.  IF bleeding should occur and soaks through the dressing, lie down and put pressure on the puncture site.  IF bleeding persists, apply gentle pressure with your hand over the dressing for 5 minutes.  IF the pressure doesn't stop the bleeding, contact your Provider immediately.     Dressing    Keep the dressing dry and in place for 24 hours, unless instructed otherwise.  IF bleeding soaks through the dressing in the first 24 hours do NOT remove the dressing as you may pull off any scab that has formed.  Instead, reinforce the dressing with extra gauze and tape.     Notify Provider    Notify Provider IF:  ~ Excessive bleeding or drainage at the puncture site,  ~ Excessive swelling, redness or tenderness at the puncture site,  ~ Fever above 100.5* orally,  ~ Severe pain,  ~ Drainage that is green, yellow, thick white or has a bad odor,  ~ Contact your Provider for any questions or concerns.     Follow-up and recommended labs and tests     You are scheduled to see Dr. Hudson, Nephrologist at [facility] on March 27th at 1:00 PM for an after hospital stay follow-up.       Significant Results and Procedures   Results for orders placed or performed during the hospital encounter of 01/28/23   XR Chest Port 1 View    Narrative    EXAM: XR CHEST PORT 1 VIEW  LOCATION: [facility]  DATE/TIME: 1/28/2023 11:57 PM    INDICATION: Sepsis.  COMPARISON: 12/06/2022      Impression    IMPRESSION:     No focal airspace disease. No pleural effusion or pneumothorax.    The cardiomediastinal silhouette is unremarkable. Aortic calcifications.   CT Head w/o Contrast    Narrative    EXAM: CT HEAD W/O CONTRAST  LOCATION: [facility]  DATE/TIME: 1/29/2023 12:33 AM    INDICATION:  Delirium and unresponsiveness  COMPARISON:  CT head 09/10/2019.  TECHNIQUE: Routine CT Head without IV contrast. Multiplanar reformats. Dose reduction techniques were used.    FINDINGS:  INTRACRANIAL CONTENTS: No intracranial hemorrhage, extraaxial collection, or mass effect.  No CT evidence of acute infarct. Mild presumed chronic small vessel ischemic changes. Mild generalized volume loss. No hydrocephalus.     VISUALIZED ORBITS/SINUSES/MASTOIDS: No intraorbital abnormality. No significant paranasal sinus mucosal disease. No middle ear or mastoid effusion.    BONES/SOFT TISSUES: No acute abnormality.      Impression    IMPRESSION:  1.  No CT evidence for acute intracranial process.  2.  Brain atrophy and presumed chronic microvascular ischemic changes as above.   CT Chest Abdomen Pelvis w/o Contrast    Narrative    EXAM: CT CHEST ABDOMEN PELVIS W/O CONTRAST  LOCATION: [facility]  DATE/TIME: 1/29/2023 12:32 AM    INDICATION: Sepsis.  COMPARISON: Chest CT from 7/21/2022 and 5/24/2022.  TECHNIQUE: CT scan of the chest, abdomen, and pelvis was performed without IV contrast. Multiplanar reformats were obtained. Dose reduction techniques were used.   CONTRAST: None.    FINDINGS:   LUNGS AND PLEURA: Patchy mostly linear opacities left lower lobe inferiorly favored to be due to some chronic scarring and/or atelectasis and similar to previous. Additional scattered areas of mild subpleural scarring or atelectasis elsewhere in the   periphery of the lungs. No definite acute appearing infiltrates or consolidation. Central airways are clear. Small chronic appearing left pleural effusion is unchanged.    MEDIASTINUM/AXILLAE: Small amount of fluid adjacent to the distal esophagus of uncertain significance. Small hiatal hernia. Scattered small posterior mediastinal lymph nodes probably reactive in nature. Normal heart size. No pericardial effusion. Normal   caliber thoracic aorta. Axillary regions are  unremarkable.    CORONARY ARTERY CALCIFICATION: Mild.    HEPATOBILIARY: Liver is negative. Cholelithiasis. No biliary dilatation.    PANCREAS: Normal.    SPLEEN: Normal.    ADRENAL GLANDS: 3 cm right adrenal nodule is technically indeterminate on this study but unchanged compared to 05/24/2022 and compatible with an adrenal adenoma based off an older MRI scan by report. Left adrenal gland is negative.    KIDNEYS/BLADDER: Normal.    BOWEL: Bowel is normal in caliber with no evidence for obstruction. Question some mild edema seen around the second and third portions of the duodenum, nonspecific. Normal appendix. Scattered colonic diverticuli without evidence for diverticulitis. No   acute bowel findings.    LYMPH NODES: Normal.    VASCULATURE: Unremarkable.    PELVIC ORGANS: Normal.    MUSCULOSKELETAL: Mild to moderate degenerative changes throughout the spine. Right TAWNY. Moderate size fat-containing umbilical hernia.      Impression    IMPRESSION:  1.  Areas of mostly linear scarring and/or atelectasis in the lungs greatest in the left lung base. Nothing definite for acute pneumonitis.    2.  Stable chronic appearing small left pleural effusion.    3.  Small amount of fluid seen adjacent to the distal thoracic esophagus of uncertain significance. Scattered nonspecific posterior mediastinal lymph nodes along the course of the esophagus. These findings are of uncertain significance or etiology.   Correlation for any signs of esophagitis or other inflammatory process.    4.  Cholelithiasis. No biliary dilatation.    5.  Question small amount of edema seen around the second and third portions of the duodenum. Clinical correlation to exclude any signs of a duodenal inflammatory process. Correlation with pancreatic enzymes also suggested to exclude pancreatitis.    6.  3 cm right adrenal nodule is technically indeterminate on this study but unchanged from prior CT scan and reportedly represents a benign adrenal adenoma  based off a prior MRI.    7.  Scattered colonic diverticuli without evidence for diverticulitis. Normal appendix.   XR Chest 1 View    Narrative    EXAM: XR CHEST 1 VIEW  LOCATION: [facility]  DATE/TIME: 2/3/2023 6:41 PM    INDICATION: Cough. Neutropenia.  COMPARISON: 01/28/2023      Impression    IMPRESSION: Mild opacity at the left lung base is new since the previous exam, and could be related to atelectasis or pneumonia. The lungs are otherwise clear. No pneumothorax. Pulmonary vascularity is within normal limits.   CT Chest w/o Contrast    Narrative    EXAM: CT CHEST WITHOUT CONTRAST  LOCATION: [facility]  DATE/TIME: 02/04/2023, 2:35 PM    INDICATION: Neutropenia. Cough.  COMPARISON: Chest CT performed 01/29/2023.  TECHNIQUE: CT chest without IV contrast. Multiplanar reformats were obtained. Dose reduction techniques were used.  CONTRAST: None.    FINDINGS:   LUNGS AND PLEURA: A small left pleural effusion has increased slightly in size. A small right pleural effusion is new since the previous exam. There is mild atelectasis at both lung bases posteriorly, also increased slightly. A 0.3 cm right upper lobe   pulmonary nodule (series 4 image 114) is unchanged. No lung masses are identified. No pneumothorax.    MEDIASTINUM/AXILLAE: No enlarged lymph nodes in the chest. No pericardial effusion. Mild atherosclerotic calcification of the thoracic aorta.    CORONARY ARTERY CALCIFICATION: Mild.    UPPER ABDOMEN: Small hiatal hernia. Indeterminate right adrenal nodule is unchanged, measuring 3 cm.    MUSCULOSKELETAL: Degenerative changes in the thoracic spine.      Impression    IMPRESSION:   1.  Small bilateral pleural effusions, increased slightly in size on the left and new on the right.  2.  Mild atelectasis at both lung bases posteriorly has also increased slightly.     Echocardiogram Complete     Value    LVEF  55-60%    Narrative     [id number]  [id number]  [id number]^OMARI     [facility]  Echocardiography Laboratory  [address] [facility] [place] [address]     Name: XAVIER ROWELL  MRN: [id number]  : 1960  Study Date: 2023 10:46 AM  Age: 62 yrs  Gender: Female  Patient Location: [facility]  Reason For Study: CHF  Ordering Physician: FEDERICO ISABEL  Referring Physician: Anjel Busby  Performed By: Emma Foster     BSA: 2.3 m2  Height: 66 in  Weight: 284 lb  HR: 104  BP: 132/72 mmHg  ______________________________________________________________________________  Procedure  Complete Portable Echo Adult. Optison (NDC #[id number]) given intravenously.  ______________________________________________________________________________  Interpretation Summary     There is mild to moderate concentric left ventricular hypertrophy.  Left ventricular systolic function is normal.  The right ventricle is mildly dilated.  Mildly decreased right ventricular systolic function  IVC diameter >2.1 cm collapsing <50% with sniff suggests a high RA pressure  estimated at 15 mmHg or greater.  Aortic valve not well seen but it is abnormal. Likely trileaflet with  sclerosis/stenosis  Mild to moderate valvular aortic stenosis.  No aortic regurgitation is present.  ABNORMAL FINDING- the ascending aorta at sinotubular ridge is not well seen  but there is eiither artifact vs possible dissection vs vegetation/flap. REC-  LETA if possible or at least CT aortogram (this echo report called to silver)  NSR with frequent ectopy  Technically difficult, suboptimal study.  ______________________________________________________________________________  Left Ventricle  The left ventricle is normal in size. There is mild to moderate concentric  left ventricular hypertrophy. Left ventricular systolic function is normal.  The visual ejection fraction is 55-60%. Left ventricular diastolic function is  indeterminate. Normal left  ventricular wall motion.     Right Ventricle  The right ventricle is mildly dilated. Mildly decreased right ventricular  systolic function.     Atria  The left atrium is borderline dilated. Right atrial size is normal.     Mitral Valve  There is moderate mitral annular calcification.     Tricuspid Valve  No tricuspid regurgitation. Right ventricular systolic pressure could not be  approximated due to inadequate tricuspid regurgitation. IVC diameter >2.1 cm  collapsing <50% with sniff suggests a high RA pressure estimated at 15 mmHg or  greater.     Aortic Valve  The aortic valve is not well visualized. Aortic valve not well seen but it is  abnormal. Likely trileaflet with sclerosis/stenosis. No aortic regurgitation  is present. Mild to moderate valvular aortic stenosis.     Pulmonic Valve  The pulmonic valve is not well seen, but is grossly normal.     Vessels  The aortic root is not well visualized. ABNORMAL FINDING- the ascending aorta  at sinotubular ridge is not well seen but there is eiither artifact vs  possible dissection vs vegetation/flap. REC- LETA if possible or at least CT  aortogram (this echo report called to silver).     Pericardium  The pericardium appears normal.     Rhythm  Sinus rhythm was noted. NSR with frequent ectopy.  ______________________________________________________________________________  MMode/2D Measurements & Calculations  IVSd: 1.5 cm     LVIDd: 4.9 cm  LVIDs: 3.2 cm  LVPWd: 1.3 cm  FS: 33.9 %  LV mass(C)d: 275.2 grams  LV mass(C)dI: 118.5 grams/m2  Ao root diam: 3.7 cm  LA dimension: 4.5 cm  asc Aorta Diam: 3.3 cm  LA/Ao: 1.2  LVOT diam: 2.4 cm  LVOT area: 4.5 cm2  LA Volume (BP): 70.3 ml  LA Volume Index (BP): 30.3 ml/m2  RWT: 0.53     Doppler Measurements & Calculations  MV E max chris: 77.3 cm/sec  MV A max chris: 99.7 cm/sec  MV E/A: 0.78  MV dec slope: 383.5 cm/sec2  MV dec time: 0.21 sec  Ao V2 max: 333.0 cm/sec  Ao max P.4 mmHg  Ao V2 mean: 236.0 cm/sec  Ao mean P.0  mmHg  Ao V2 VTI: 59.4 cm  MERON(I,D): 1.5 cm2  MERON(V,D): 1.3 cm2  LV V1 max PG: 3.8 mmHg  LV V1 max: 97.7 cm/sec  LV V1 VTI: 20.3 cm  SV(LVOT): 91.8 ml  SI(LVOT): 39.6 ml/m2  PA acc time: 0.10 sec  TR max emile: 250.0 cm/sec  TR max P.0 mmHg  AV Emile Ratio (DI): 0.29  MERON Index (cm2/m2): 0.67  E/E' av.5  Lateral E/e': 11.8  Medial E/e': 11.1     ______________________________________________________________________________  Report approved by: Tariq Doty 2023 01:25 PM         Transesophageal Echocardiogram     Value    LVEF  60-65%    Narrative    [id number]  [id number]  [id number]  [id number]^SURESH^TAIWO^DASHA     [facility]  Echocardiography Laboratory  [address] [place] [address]     Name: XAVIER ROWELL  MRN: [id number]  : 1960  Study Date: 2023 02:57 PM  Age: 62 yrs  Gender: Female  Patient Location: [facility]  Reason For Study: Endocarditis  Ordering Physician: TAIWO NESS  Referring Physician: TAIWO NESS  Performed By: KEATON Pinto     BSA: 2.3 m2  Height: 66 in  Weight: 284 lb  HR: 122  BP: 141/63 mmHg  ______________________________________________________________________________  Procedure  Complete LETA Adult. 3D image acquisition, reconstruction, and real-time  interpretation was performed.  ______________________________________________________________________________  Interpretation Summary     1. No intracardiac mass or thrombus.  2. Normal left ventricular size and systolic function. LVEF 60-65%.  3. Normal right ventricular size and systolic function.  4. Trileaflet aortic valve sclerosis with trace regurgitation, no stenosis.  5. No evidence of aortic valve vegetation or aortic dissection, noted on  yesterday's transthoracic echocardiogram.     Findings discussed with Dr. Ness.     ______________________________________________________________________________  LETA  Versed (3mg) was given intravenously. Fentanyl (100mcg) was  given  intravenously. I determined this patient to be an appropriate candidate for  the planned sedation and procedure and have reassessed the patient immediately  prior to sedation and procedure. Total sedation time: 12 minutes of continuous  bedside 1:1 monitoring. Prior to the exam, the oral cavity was checked and no  overcrowding was noted. Consent to the procedure was obtained prior to  sedation. The transesophageal probe was passed without difficulty. There were  no complications associated with this procedure.     Left Ventricle  The left ventricle is normal in size. Left ventricular systolic function is  normal. The visual ejection fraction is 60-65%. No regional wall motion  abnormalities noted. There is no thrombus seen in the left ventricle.     Right Ventricle  The right ventricle is normal size. The right ventricular systolic function is  normal. There is no mass or thrombus in the right ventricle.     Atria  Normal left atrial size. No left atrial mass or thrombus visualized. Right  atrial size is normal. No evidence of right atrial mass/thrombus. Intact  atrial septum. There is no color Doppler evidence of an atrial shunt. The left  atrial appendage was well visualized and free of thrombus.     Mitral Valve  The mitral valve leaflets appear normal. There is no evidence of stenosis,  fluttering, or prolapse. There is trace mitral regurgitation. There is no  mitral valve stenosis.     Tricuspid Valve  Normal tricuspid valve. There is trace tricuspid regurgitation.     Aortic Valve  The aortic valve is trileaflet with aortic valve sclerosis. There is moderate  aortic sclerosis of the left coronary cusp. There is no vegetation on the  aortic valve. There is trace aortic regurgitation. No hemodynamically  significant valvular aortic stenosis.     Pulmonic Valve  Normal pulmonic valve. There is trace pulmonic valvular regurgitation.     Vessels  The aortic root is normal size. Normal size ascending aorta.  Normal ascending,  transverse (arch), and descending aorta. Normal pulmonary venous drainage.     Pericardial/Pleural  There is no pericardial effusion.     Rhythm  Sinus rhythm was noted.  ______________________________________________________________________________  Report approved by: Dr Mazin Bourgeois 01/31/2023 04:47 PM     ______________________________________________________________________________        *Note: Due to a large number of results and/or encounters for the requested time period, some results have not been displayed. A complete set of results can be found in Results Review.       Discharge Medications   Current Discharge Medication List      CONTINUE these medications which have NOT CHANGED    Details   acetaminophen (TYLENOL) 325 MG tablet Take 2 tablets (650 mg) by mouth every 6 hours as needed for mild pain or fever    Associated Diagnoses: Osteoarthritis of right knee, unspecified osteoarthritis type      albuterol (PROAIR HFA/PROVENTIL HFA/VENTOLIN HFA) 108 (90 Base) MCG/ACT inhaler Inhale 2 puffs into the lungs every 6 hours as needed for shortness of breath / dyspnea or wheezing  Qty: 18 g, Refills: 5    Comments: Pharmacy may dispense brand covered by insurance (Proair, or proventil or ventolin or generic albuterol inhaler)  Associated Diagnoses: Chronic cough      ARIPiprazole (ABILIFY) 10 MG tablet Take 1 tablet (10 mg) by mouth daily  Qty: 90 tablet, Refills: 1    Associated Diagnoses: Adjustment disorder with depressed mood      DULoxetine (CYMBALTA) 60 MG capsule Take 1 capsule (60 mg) by mouth daily  Qty: 30 capsule, Refills: 0    Associated Diagnoses: Adjustment disorder with depressed mood      lisinopril (ZESTRIL) 5 MG tablet Take 1 tablet (5 mg) by mouth daily  Qty: 90 tablet, Refills: 3    Associated Diagnoses: Cardiac arrest (H)      methotrexate 2.5 MG tablet Take 10 tablets (25 mg) by mouth every 7 days Labs every 8 - 12 weeks for refills.  Qty: 40 tablet, Refills: 2     "Associated Diagnoses: Rheumatoid arthritis involving multiple sites with positive rheumatoid factor (H)      traMADol (ULTRAM) 50 MG tablet Take 1 tablet (50 mg) by mouth every 6 hours as needed for severe pain (7-10)  Qty: 10 tablet, Refills: 0    Associated Diagnoses: Rheumatoid arthritis, involving unspecified site, unspecified whether rheumatoid factor present (H)      VITAMIN D3 25 MCG (1000 UT) tablet TAKE 2 TABLETS BY MOUTH EVERY DAY      warfarin ANTICOAGULANT (COUMADIN) 5 MG tablet TAKE 1 TABLET BY MOUTH DAILY OR AS DIRECTED BY ANTICOAGULATION CLINIC  Qty: 90 tablet, Refills: 1    Associated Diagnoses: Pulmonary embolism with acute cor pulmonale, unspecified chronicity, unspecified pulmonary embolism type (H)      gabapentin (NEURONTIN) 100 MG capsule Take 1 capsule (100 mg) by mouth 3 times daily  Qty: 90 capsule, Refills: 1    Associated Diagnoses: Multiple joint pain      insulin syringe-needle U-100 (BD INSULIN SYRINGE ULTRAFINE) 30G X 1/2\" 1 ML miscellaneous For Methotrexate use weekly.  Qty: 12 each, Refills: 3    Associated Diagnoses: Rheumatoid arthritis involving multiple sites with positive rheumatoid factor (H)           Allergies   Allergies   Allergen Reactions     Adhesive Tape Blisters     Erythromycin Rash     "

## 2023-02-21 NOTE — PLAN OF CARE
Discharge Note    Patient discharged to TCU via transportation service  accompanied by no family/friend .  Prescriptions sent with patient to discharge facility .   Belongings reviewed and sent with patient.   Home medications returned to patient: Yes  Equipment sent with: patient.   patient verbalizes understanding of discharge instructions. AVS given to patient.

## 2023-02-21 NOTE — PROGRESS NOTES
ANTICOAGULATION  MANAGEMENT: Discharge Review    Shira Rodriguez chart reviewed for anticoagulation continuity of care    Hospital Admission on 1/28/23-2/21/23 for severe sepsis, neutropenic fever.    Discharge disposition: [facility]- [facility]    Results:    Recent labs: (last 7 days)     02/15/23  0745 02/16/23  0758 02/17/23  0804 02/18/23  0843 02/19/23  1212 02/20/23  0910 02/21/23  0726   INR 2.02* 2.35* 2.79* 2.74* 2.63* 2.61* 2.48*   AAUFH 0.68 <0.10  --   --   --   --   --      Anticoagulation inpatient management:     more warfarin administered than maintenance regimen - writer updated calendar with dosing from 2/9/23 until discharge    Anticoagulation discharge instructions:     Warfarin dosing: Discharge AVS states start taking 5 mg daily   Bridging: No   INR goal change: No      Medication changes affecting anticoagulation: Yes: New medications: atorvastatin, vitamin b-12, folic acid, melatonin, miconazole, mutlivitamin, protonix, miralax, senna-docusate. Stopped medications: methotrexate, tramadol    Additional factors affecting anticoagulation: Yes: s/p bone marrow biopsy, post-hospitalization     PLAN     Agree with dosing adjustment on discharge    Patient not contacted  ACC will resume monitoring upon discharge from TCU    Anticoagulation Calendar updated    Rivka Melchor RN

## 2023-02-21 NOTE — PLAN OF CARE
Occupational Therapy Discharge Summary    Reason for therapy discharge:    Discharged to transitional care facility.    Progress towards therapy goal(s). See goals on Care Plan in [facility] electronic health record for goal details.  Goals partially met.  Barriers to achieving goals:   discharge from facility.    Therapy recommendation(s):    Continued therapy is recommended.  Rationale/Recommendations:  Pt limited by decreased strength and IND in I/ADLs. Pt however making good overall progress in therapy this day. Continue to rec skilled OT in TCU to address safety and IND in I/ADLs..

## 2023-02-21 NOTE — PROGRESS NOTES
Care Management Follow Up    Length of Stay (days): 23    Expected Discharge Date: 02/21/2023     Concerns to be Addressed:       Patient plan of care discussed at interdisciplinary rounds: Yes    Anticipated Discharge Disposition: Transitional Care     Anticipated Discharge Services: None  Anticipated Discharge DME: None    Patient/family educated on Medicare website which has current facility and service quality ratings: yes  Education Provided on the Discharge Plan:    Patient/Family in Agreement with the Plan: yes    Referrals Placed by CM/SW:    Private pay costs discussed:     Additional Information:  Received [facility] authorization thru [employer]. Authorization date is from today 2/21 thru 2/27. Authorizaton # [id number].  This information was relayed to [place] admission department.       VALERIE NgSW

## 2023-02-21 NOTE — PLAN OF CARE
Physical Therapy Discharge Summary    Reason for therapy discharge:    Discharged to transitional care facility.    Progress towards therapy goal(s). See goals on Care Plan [facility] electronic health record for goal details.  Goals partially met.  Barriers to achieving goals:   discharge from facility.    Therapy recommendation(s):    Continued therapy is recommended.  Rationale/Recommendations:  Pt below baseline. Pt able to progress to perform STS with FWW and SBA from recliner. Ambulating ~100' w/ FWW and SBA. Very limited secondary to limited activity tolerance and gross functional strength deficits. Recommend TCU to increase strength and functional mobility.

## 2023-02-21 NOTE — PLAN OF CARE
Goal Outcome Evaluation: 1930-0700    A&O x4, pleasant and cooperative. Denies pain. No MD YARIEL aware. Sacral mepilex in place. Up SBA to commode.  Now on a pulsate mattress. Barrier cream applied to pannus, breast and abdominal and R knee folds. Standing weight obtained.     Will discharge to TCU @ 1400 today.

## 2023-02-21 NOTE — PROGRESS NOTES
Care Management Discharge Note    Discharge Date: 02/21/2023       Discharge Disposition: Transitional Care    Discharge Services: None    Discharge DME: None    Discharge Transportation: family or friend will provide    Private pay costs discussed: Not applicable    PAS Confirmation Code: [id number]  Patient/family educated on Medicare website which has current facility and service quality ratings: yes    Education Provided on the Discharge Plan:    Persons Notified of Discharge Plans: Patient  Patient/Family in Agreement with the Plan: yes    Handoff Referral Completed: Yes    Additional Information:  Patient will be discharging today at 2pm to [facility]. Orders faxed. Shilo Flores completed. PAS Completed.     CHARLENE Burns

## 2023-02-22 ENCOUNTER — PATIENT OUTREACH (OUTPATIENT)
Dept: CARE COORDINATION | Facility: CLINIC | Age: 63
End: 2023-02-22
Payer: COMMERCIAL

## 2023-02-22 VITALS
OXYGEN SATURATION: 98 % | SYSTOLIC BLOOD PRESSURE: 129 MMHG | HEIGHT: 66 IN | BODY MASS INDEX: 35.1 KG/M2 | RESPIRATION RATE: 18 BRPM | TEMPERATURE: 98.2 F | DIASTOLIC BLOOD PRESSURE: 83 MMHG | HEART RATE: 85 BPM | WEIGHT: 218.4 LBS

## 2023-02-22 PROCEDURE — 36415 COLL VENOUS BLD VENIPUNCTURE: CPT | Performed by: NURSE PRACTITIONER

## 2023-02-22 PROCEDURE — 86481 TB AG RESPONSE T-CELL SUSP: CPT | Performed by: NURSE PRACTITIONER

## 2023-02-22 PROCEDURE — P9604 ONE-WAY ALLOW PRORATED TRIP: HCPCS | Performed by: NURSE PRACTITIONER

## 2023-02-22 NOTE — PROGRESS NOTES
[facility]    Background: Transitional Care Management program identified per system criteria and reviewed by [facility] team for possible outreach.    Assessment: Upon chart review, [facility] Team member will not proceed with patient outreach related to this episode of Transitional Care Management program due to reason below:    Patient has discharged to a Memory Care, Long-term Care, Assisted Living or Group Home where patient is receiving on-site support with their daily cares, including support with hospital follow up plan.    Plan: Transitional Care Management episode addressed appropriately per reason noted above.          YUDITH Olivas  Connected Care Resource [facility]    *Connected Care Resource Team does NOT follow patient ongoing. Referrals are identified based on internal discharge reports and the outreach is to ensure patient has an understanding of their discharge instructions.

## 2023-02-22 NOTE — PROGRESS NOTES
[facility] GERIATRICS    PRIMARY CARE PROVIDER AND CLINIC:  Anjel Busby MD, [address] [facility] / [facility] [address]  Chief Complaint   Patient presents with     [facility] Medical Record Number:  [id number]  Place of Service where encounter took place:  [facility] (TCU) [[id number]]    Shira Rodriguez  is a 62 year old  (1960), admitted to the above facility from  [facility]. Hospital stay 1/28/23 through 2/21/23..   HPI:    62-year-old female PMH RA on methotrexate, prior massive PE on warfarin, morbid obesity hospitalized with severe sepsis, neutropenia/pancytopenia.  WBC 0.4 at admission,  Source panniculitis vs mucositis vs duodenitis. Treated with fluids. ID consult: positive fungitell (significance unclear), positive EBV IgG (but negative IgM and DNA). Treated with course of doxycycline, fluconazole, Neupogen. WBC 50300 at discharge. Hgb 9,4, Plt 59 at admission. Anemia of chronic disease and low B12 and folate levels. Bone marrow biopsy no clear etiology. Treated with 1 units blood and daily platelet transfusions 1/30-2/5. Remains on B12 supplement. KRISS on CKD 4 Cr baseline 2-2.2, on admission 3.25. Improved with fluids, cr stable 1.92 at discharge. Esophagitis/duodenitis resolved, on Protonix. Hypokalemia replaced. Type 2 MI due to sepsis no symptoms. TTE 1/30 normal LV function. Back on coumadin due to hx PE. RA to / rheumatology, hold methotrexate. COPD, cough no acute exacerbation. Depression, pain remains on Abilify, Cymbalta and Neurontin. HTN now off lisinopril. Acute encephalopathy resolved. Hyponatremia Na 132 resolved. Hypomagnesemia replaced. Adrenal nodule noted on CT, unchanged from previous. To TCU for rehab.      Seen for initial TCU visit. Reports doing well, tired. No headaches, dizziness, chest pain, dyspnea, bowel or bladder issues. Asks for Full Code status. BP range 116-142/80-90 and sats 98% room air.     CODE  STATUS/ADVANCE DIRECTIVES DISCUSSION:  Full Code  CPR/Full code   ALLERGIES:   Allergies   Allergen Reactions     Adhesive Tape Blisters     Erythromycin Rash      PAST MEDICAL HISTORY:   Past Medical History:   Diagnosis Date     Acute massive pulmonary embolism (H) 2019     Cardiac arrest (H) 2019    Due to PE; required ECMO     Chronic bronchitis (H) 07/30/2015     Chronic pain      Contact dermatitis      COPD (chronic obstructive pulmonary disease) (H)      Depressive disorder 1985     Eczema     HANDS     Elevated liver enzymes      H/O total knee replacement, left      History of total hip replacement 10/27/2011     Hyperlipidemia      Hypertension      Morbid obesity (H)      Osteoarthrosis     right knee     Paroxysmal atrial fibrillation (H)      RA (rheumatoid arthritis) (H) 8/2018     Sleep apnea      Tobacco use disorder      Varicella 1965      PAST SURGICAL HISTORY:   has a past surgical history that includes TOTAL HIP ARTHROPLASTY (07/09/04); Arthroplasty knee (6/14/2012); Arthroscopy hip (Right); Extracorporeal Membrance Oxygenation (N/A, 9/9/2019); IR Pulmonary Angiogram Bilateral (9/10/2019); Insert extracorporal membrane oxygenator (N/A, 9/17/2019); Bronchoscopy flexible and rigid (N/A, 9/17/2019); IR Mech Thromb Prim Art, Non-Intracrnl (9/10/2019); IR Thrombolysis Arterial Infusion Initial Day (9/10/2019); Thoracentesis (N/A, 1/16/2020); Colonoscopy (N/A, 7/16/2021); and Bone marrow biopsy, bone specimen, needle/trocar (N/A, 2/3/2023).  FAMILY HISTORY: family history includes Alcoholism in her father and sister; Breast Cancer in her paternal grandmother; Cardiovascular in her paternal aunt and paternal uncle; Cerebrovascular Disease in her mother; Depression in her cousin, maternal grandmother, mother, sister, sister, and another family member; Diabetes in her mother; Heart Disease in her father and sister; Hypertension in her maternal grandmother and mother; Pancreatic Cancer in her paternal  grandmother; Prostate Cancer in her paternal grandfather; Skin Cancer in her mother; Substance Abuse in her father, sister, and sister; Thyroid Disease in her mother and sister.  SOCIAL HISTORY:   reports that she quit smoking about 3 years ago. Her smoking use included cigarettes. She started smoking about 41 years ago. She has never used smokeless tobacco. She reports current alcohol use. She reports that she does not use drugs.  Patient's living condition: lives alone    Post Discharge Medication Reconciliation Status:   MED REC REQUIRED  Post Medication Reconciliation Status:  Discharge medications reconciled, continue medications without change         Current Outpatient Medications   Medication Sig     acetaminophen (TYLENOL) 325 MG tablet Take 2 tablets (650 mg) by mouth every 6 hours as needed for mild pain or fever     albuterol (PROAIR HFA/PROVENTIL HFA/VENTOLIN HFA) 108 (90 Base) MCG/ACT inhaler Inhale 2 puffs into the lungs every 6 hours as needed for shortness of breath / dyspnea or wheezing     ARIPiprazole (ABILIFY) 10 MG tablet Take 1 tablet (10 mg) by mouth daily (Patient taking differently: Take 5 mg by mouth daily)     atorvastatin (LIPITOR) 40 MG tablet Take 1 tablet (40 mg) by mouth every evening     cyanocobalamin (VITAMIN B-12) 500 MCG SUBL sublingual tablet Place 2 tablets (1,000 mcg) under the tongue daily     DULoxetine (CYMBALTA) 60 MG capsule Take 1 capsule (60 mg) by mouth daily (Patient taking differently: Take 120 mg by mouth daily)     folic acid (FOLVITE) 1 MG tablet Take 2 tablets (2 mg) by mouth daily     gabapentin (NEURONTIN) 100 MG capsule Take 1 capsule (100 mg) by mouth 3 times daily     lisinopril (ZESTRIL) 5 MG tablet Take 1 tablet (5 mg) by mouth daily     melatonin 3 MG tablet Take 1 tablet (3 mg) by mouth nightly as needed for sleep     miconazole (MICATIN) 2 % external cream Apply topically 2 times daily     multivitamin w/minerals (THERA-VIT-M) tablet Take 1 tablet by  "mouth daily     pantoprazole (PROTONIX) 40 MG EC tablet Take 1 tablet (40 mg) by mouth 2 times daily (before meals)     polyethylene glycol (MIRALAX) 17 GM/Dose powder Take 17 g by mouth daily     senna-docusate (SENOKOT-S/PERICOLACE) 8.6-50 MG tablet Take 1 tablet by mouth 2 times daily as needed for constipation     VITAMIN D3 25 MCG (1000 UT) tablet TAKE 2 TABLETS BY MOUTH EVERY DAY     warfarin ANTICOAGULANT (COUMADIN) 2.5 MG tablet Take 1 tablet (2.5 mg) by mouth daily 2.5 mg daily except for Monday which takes 5 mg. (Patient taking differently: Take 2.5 mg by mouth daily PTA: 2.5 mg daily except for Monday which takes 5 mg.  7.5 mg on February 24, 5 mg on February 25, 5 mg on February 26, and recheck INR on February 27)     Warfarin Therapy Reminder Take 1 each by mouth See Admin Instructions     insulin syringe-needle U-100 (BD INSULIN SYRINGE ULTRAFINE) 30G X 1/2\" 1 ML miscellaneous For Methotrexate use weekly. (Patient not taking: Reported on 2/23/2023)     No current facility-administered medications for this visit.     Facility-Administered Medications Ordered in Other Visits   Medication     sodium chloride (PF) 0.9% PF flush 10 mL       ROS:  10 point ROS of systems including Constitutional, Eyes, Respiratory, Cardiovascular, Gastroenterology, Genitourinary, Integumentary, Musculoskeletal, Psychiatric were all negative except for pertinent positives noted in my HPI.    Vitals:  /83   Pulse 85   Temp 98.2  F (36.8  C)   Resp 18   Ht 1.676 m (5' 6\")   Wt 99.1 kg (218 lb 6.4 oz)   SpO2 98%   BMI 35.25 kg/m    Exam:  GENERAL APPEARANCE:  Alert, in no distress, pleasant, cooperative, oriented x 4  EYES:  EOM, lids, pupils and irises normal, sclera clear and conjunctiva normal, no discharge or mattering on lids or lashes noted  ENT:  Mouth normal, moist mucous membranes, nose normal without drainage or crusting, external ears without lesions, hearing acuity intact  NECK: supple, symmetrical, " trachea midline  RESP:  respiratory effort normal, no chest wall tenderness, no respiratory distress, Lung sounds clear, patient is on room air  CV:  Auscultation of heart done, rate and rhythm controlled and regular today, no murmur, no rub or gallop. Edema none bilateral lower extremities  ABDOMEN:  normal bowel sounds, soft, nontender, no palpable masses.  M/S:   Gait and station unsafe without assistance, no tenderness or swelling of the joints; able to move all extremities, digits normal  NEURO: cranial nerves 2-12 grossly intact, no facial asymmetry, no speech deficits and able to follow directions, moves all extremities symmetrically  PSYCH:  insight and judgement and memory intact, affect and mood normal     Lab/Diagnostic data:  Most Recent 3 CBC's:  Recent Labs   Lab Test 02/21/23  0726 02/20/23  0910 02/19/23  1212   WBC 14.8* 16.2* 18.1*   HGB 7.6* 8.4* 8.1*   MCV 93 95 94    339 220     Most Recent 3 BMP's:  Recent Labs   Lab Test 02/21/23  0726 02/20/23  0910 02/19/23  1212 02/18/23  0832 02/16/23  0758 02/15/23  0745 02/14/23  0714 02/13/23  0826   NA  --   --   --   --   --  144 142 145   POTASSIUM 4.0 4.1 3.7 4.1   < > 3.9  3.8 3.8 4.0   CHLORIDE  --   --   --   --   --  107 107 110*   CO2  --   --   --   --   --  24 26 26   BUN  --   --   --   --   --  30.1* 31.0* 29.1*   CR  --   --   --  1.92*  --  2.10* 2.30* 2.26*   ANIONGAP  --   --   --   --   --  13 9 9   MARIA INES  --   --   --   --   --  8.6* 8.4* 8.3*   GLC  --   --   --   --   --  85 90 88    < > = values in this interval not displayed.     Most Recent TSH and T4:  Recent Labs   Lab Test 12/06/22  1049 06/16/21  1603 09/24/20  1415   TSH 1.60   < > 1.59   T4  --   --  1.07    < > = values in this interval not displayed.     Most Recent Hemoglobin A1c:  Recent Labs   Lab Test 05/24/22  0959   A1C 5.3       ASSESSMENT/PLAN:  Severe sepsis  Acute, unclear etiology. Completed antibiotics. Check CBC on 2/24    NSTEMI  Essential  hypertension  Dyslipidemia  Chronic, stable. Continue atorvastatin 40 mg daily and lisinopril 5 mg daily. Monitor vs, review ranges next visit.     KRISS on CKD  Baseline creatinine 1.5-2. Avoid NSAIDs and nephrotoxins. BMP check 2/24 and f/u results.      PAF  HX PE  Chronic. Continue coumadin and INR checks as ordered. Monitor HR. Adjust warfarin dose for INR target of 2-3     Pancytopenia   Acute, likely due to methotrexate, B12/folate deficiency. CBC on 2/24. Transfuse PRN for hemoglobin less than 7 or platelets less than 50,000. Follow-up with hematology as directed     Seropositive inflammatory arthritis  Chronic. Continue to hold methotrexate and f/u rheumatology.      Depression  Chronic pain  Chronic, stable with aripiprazole 10 mg daily, duloxetine 60 mg daily, gabapentin 100 mg 3 times daily. Monitor.     Morbid obesity  Physical deconditioning  Acute on chronic. Therapies as ordered and f/u progress next visit. Encourage wt loss.     Advance directives  Full Code desired.     Orders:  Full Code  CBC and BMP on 2/24 diagnosis anemia, CKD    Total unit/floor time of 46 minutes spent consisted of the following: examination of patient, reviewing the record including pertinent labs and imaging. More than 50% of this time was spent in coordination of care with the patient, nursing staff, and other healthcare providers. This time was spent on discussing the care plan including labs, discharge/safe placement, and specialty follow up. Additional specific discussion included review of code status, lab monitoring, management of pain, expected TCU course/routine/discharge planning and clarification of meds/orders with nursing for a total of over 24 min.     Electronically signed by:  OK Benavides CNP

## 2023-02-23 ENCOUNTER — TRANSITIONAL CARE UNIT VISIT (OUTPATIENT)
Dept: GERIATRICS | Facility: CLINIC | Age: 63
End: 2023-02-23
Payer: COMMERCIAL

## 2023-02-23 ENCOUNTER — LAB REQUISITION (OUTPATIENT)
Dept: LAB | Facility: CLINIC | Age: 63
End: 2023-02-23

## 2023-02-23 DIAGNOSIS — N18.9 ACUTE KIDNEY INJURY SUPERIMPOSED ON CKD (H): ICD-10-CM

## 2023-02-23 DIAGNOSIS — I21.4 NSTEMI (NON-ST ELEVATED MYOCARDIAL INFARCTION) (H): ICD-10-CM

## 2023-02-23 DIAGNOSIS — N18.9 CHRONIC KIDNEY DISEASE, UNSPECIFIED: ICD-10-CM

## 2023-02-23 DIAGNOSIS — I48.0 PAF (PAROXYSMAL ATRIAL FIBRILLATION) (H): ICD-10-CM

## 2023-02-23 DIAGNOSIS — A41.9 SEVERE SEPSIS (H): Primary | ICD-10-CM

## 2023-02-23 DIAGNOSIS — I10 HYPERTENSION, UNSPECIFIED TYPE: ICD-10-CM

## 2023-02-23 DIAGNOSIS — Z86.711 HISTORY OF PULMONARY EMBOLISM: ICD-10-CM

## 2023-02-23 DIAGNOSIS — D61.818 PANCYTOPENIA (H): ICD-10-CM

## 2023-02-23 DIAGNOSIS — R65.20 SEVERE SEPSIS (H): Primary | ICD-10-CM

## 2023-02-23 DIAGNOSIS — G89.29 OTHER CHRONIC PAIN: ICD-10-CM

## 2023-02-23 DIAGNOSIS — M19.90 INFLAMMATORY ARTHRITIS: ICD-10-CM

## 2023-02-23 DIAGNOSIS — D64.9 ANEMIA, UNSPECIFIED: ICD-10-CM

## 2023-02-23 DIAGNOSIS — R53.81 PHYSICAL DECONDITIONING: ICD-10-CM

## 2023-02-23 DIAGNOSIS — F32.A DEPRESSION, UNSPECIFIED DEPRESSION TYPE: ICD-10-CM

## 2023-02-23 DIAGNOSIS — N17.9 ACUTE KIDNEY INJURY SUPERIMPOSED ON CKD (H): ICD-10-CM

## 2023-02-23 DIAGNOSIS — E78.5 DYSLIPIDEMIA: ICD-10-CM

## 2023-02-23 DIAGNOSIS — E66.01 MORBID OBESITY (H): ICD-10-CM

## 2023-02-23 LAB
GAMMA INTERFERON BACKGROUND BLD IA-ACNC: 0.04 IU/ML
M TB IFN-G BLD-IMP: NEGATIVE
M TB IFN-G CD4+ BCKGRND COR BLD-ACNC: 1.75 IU/ML
MITOGEN IGNF BCKGRD COR BLD-ACNC: 0 IU/ML
MITOGEN IGNF BCKGRD COR BLD-ACNC: 0.02 IU/ML
QUANTIFERON MITOGEN: 1.79 IU/ML
QUANTIFERON NIL TUBE: 0.04 IU/ML
QUANTIFERON TB1 TUBE: 0.04 IU/ML
QUANTIFERON TB2 TUBE: 0.06

## 2023-02-23 PROCEDURE — 99310 SBSQ NF CARE HIGH MDM 45: CPT | Performed by: NURSE PRACTITIONER

## 2023-02-23 NOTE — PROGRESS NOTES
[place] [facility]  INITIAL VISIT NOTE  February 24, 2023    PRIMARY CARE PROVIDER AND CLINIC:  Anjel Busby [address] [facility] / [facility] [id number]    CHIEF COMPLAINT:  Hospital follow-up/Initial visit    HPI:    Shira Rodriguez is a 62 year old  (1960) female who was seen at [facility] on February 24, 2023 for an initial visit.     Medical history is notable for hypertension, dyslipidemia, COPD, CKD, seropositive inflammatory arthritis, massive pulmonary embolism with PEA, PAF, depression, obesity, and CYRUS.    Summary of hospital course:  Patient was hospitalized at [facility] from January 28 through February 21, 2023 for severe sepsis.  She originally presented after she was found unresponsive in her home, and noted hypotensive, febrile, and pancytopenic. Blood work was significant for white count of 0.4, hemoglobin of 9.4, and platelet count of 59,000.  UA was negative for leukocyte esterase or nitrite.  Troponin I was mildly elevated at 48 with no trajectory.  EKG showed sinus tachycardia.  CT head was negative for acute intracranial process.  CT chest/abdomen/pelvis demonstrated areas of mostly linear scarring and a/or atelectasis in the lungs, greatest in the left lung base, stable chronic appearing small left lower effusion, small amount of fluid adjacent to distal thoracic esophagus (of uncertain significance), scattered nonspecific posterior mediastinal lymph nodes, cholelithiasis, question small amount of edema around the second and third portions of duodenum, and incidental 3 cm right adrenal nodule.  She was started on broad-spectrum antibiotic therapy and resuscitated with IV fluids.  Urine culture grew mixture of urogenital hiwot.  Blood cultures yielded no growth.  Screening for COVID-19, RSV, and influenza was negative.  1, 3 beta D glucan Fungitell was positive (unclear significance).  Tests for Blastomyces, histoplasma, CMV, and herpes type  I and II were negative.  Serology for Anaplasma, Ehrlichia, and Rickettsia was negative.  EBV nuclear antigen was more than 600 but EBV capsid IgM was less than 10 and EBV DNA was not detected.  Source of sepsis felt to be due to panniculitis (abdomen and breast areas) versus mucositis versus possible duodenitis, in the setting of neutropenia.  Notably, transesophageal echocardiogram showed no evidence for vegetations.  She completed 5-day course of doxycycline, 7-day course of fluconazole, and IV cefepime from January 30 through February 8, 2023.   She was treated with nystatin swish and swallow for mucocutaneous candidiasis.  B12 level was less than 150 and Folate level was low at 2.8.  She was started on leucovorin rescue.  Peripheral smear showed moderate to marked pancytopenia.  Flow cytometry was remarkable for prototypical B cells, no aberrant immunophenotype on T cells, and rare to absent CD34 positive blasts.  There was elevated kappa and lambda free light chains, monoclonal IgM immunoglobulin of lambda light chain type, elevated IgM of 395, and normal IgG and IgA levels.  Immunological work-up showed negative ANCA and ds-DNA and normal C3 and C4.  Bone marrow biopsy on February 3 showed no clear etiology but no evidence for neoplasm or aplastic anemia.  Hypocellularity was attributed primarily to methotrexate and B12/folate deficiency.  She was transfused with 1 unit of PRBC on January 31 and February 4.  She received daily platelet transfusion from January 30 through February 5 for platelet count less than 50,000.  She was treated with Neupogen from January 30 through February 7.  Renal function improved with IV hydration.  TCU was recommended per therapies for prolonged weakness and deconditioning.  At discharge, she had white count of 14.8, hemoglobin of 7.6, and platelet count of 269,000.    Patient is admitted to this facility for medical management, nursing care, and rehab.     Of note, history was  obtained from patient, facility RN, and extensive review of the chart.    Today's visit:  Patient was seen in her room, while lying in bed.  She appears weak and frail but comfortable.  She believes that she is getting stronger with therapies.  She had a bowel movement yesterday.  She denies fever, chills, chest pain, palpitation, dyspnea, nausea, vomiting, abdominal pain, or urinary symptoms.  She states that she is intolerant of CPAP.  Today's INR is subtherapeutic at 1.7.      CODE STATUS:   CPR/Full code     PAST MEDICAL HISTORY:   Neutropenic fever with severe sepsis in February 2023 (unclear source of sepsis, likely due to panniculitis, mucositis, or possible duodenitis)   Type II NSTEMI in February 2023, due to severe sepsis  Massive bilateral pulmonary embolism in September 2019 (s/p suction thrombectomy)  PEA in September 2019 due to massive pulmonary embolism, requiring ECMO  PAF in September 2019  Hypertension  Dyslipidemia  COPD/chronic bronchitis  CKD stage IIIb-IV, baseline creatinine 1.5-2  Candidal intertrigo  Pancytopenia (due to methotrexate, B12/folate deficiency)  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, per immunofixation on January 29, 2023  B12 deficiency  Folate deficiency  Esophagitis  Duodenitis  Mucositis   Hyperuricemia  Right adrenal nodule, measuring 3 cm, incidental finding on CT abdomen/pelvis on January 29, 2023  Cholelithiasis  Colonic diverticulosis  Seropositive inflammatory arthritis  Osteoarthritis  Depression  Obesity, BMI 35.2  CYRUS; noncompliant with CPAP due to intolerance      Past Medical History:   Diagnosis Date     Acute massive pulmonary embolism (H) 2019     Cardiac arrest (H) 2019    Due to PE; required ECMO     Chronic bronchitis (H) 07/30/2015     Chronic pain      Contact dermatitis      COPD (chronic obstructive pulmonary disease) (H)      Depressive disorder 1985     Eczema     HANDS     Elevated liver enzymes      H/O total knee replacement, left       History of total hip replacement 10/27/2011     Hyperlipidemia      Hypertension      Morbid obesity (H)      Osteoarthrosis     right knee     Paroxysmal atrial fibrillation (H)      RA (rheumatoid arthritis) (H) 8/2018     Sleep apnea      Tobacco use disorder      Varicella 1965       PAST SURGICAL HISTORY:   Past Surgical History:   Procedure Laterality Date     ARTHROPLASTY KNEE  6/14/2012    Procedure: ARTHROPLASTY KNEE;  Left Total Knee Arthroplasty;  Surgeon: Annette Quesada MD;  Location: [facility] OR     ARTHROSCOPY HIP Right      BONE MARROW BIOPSY, BONE SPECIMEN, NEEDLE/TROCAR N/A 2/3/2023    Procedure: bone marrow biopsy;  Surgeon: So Horowitz MD;  Location:  GI     BRONCHOSCOPY FLEXIBLE AND RIGID N/A 9/17/2019    Procedure: Bronchoscopy flexible;  Surgeon: Patrick Rayo MD;  Location:  OR     COLONOSCOPY N/A 7/16/2021    Procedure: COLONOSCOPY, WITH POLYPECTOMY AND BIOPSY;  Surgeon: Diogo Lynch MD;  Location:  GI     CV EXTRACORPERAL MEMBRANE OXYGENATION N/A 9/9/2019    Procedure: Extracorporeal Membrance Oxygenation;  Surgeon: Kaleb Mcfarlane MD;  Location:  [facility]     INSERT EXTRACORPORAL MEMBRANE OXYGENATOR N/A 9/17/2019    Procedure: Venous-Venous cannulation using ultrasound guidance and transesophageal echocardiogram, venous-arterial ecmo decannulation and repair of left femoral artery;  Surgeon: Patrick Rayo MD;  Location: [facility] OR     IR MECH THROMB PRIM ART, NON-INTRACRNL  9/10/2019     IR PULMONARY ANGIOGRAM BILATERAL  9/10/2019     IR THROMBOLYSIS ARTERIAL INFUSION INITIAL DAY  9/10/2019     THORACENTESIS N/A 1/16/2020    Procedure: THORACENTESIS;  Surgeon: Georgina Richardson MD;  Location: [facility]     ZZC TOTAL HIP ARTHROPLASTY  07/09/04    RT       FAMILY HISTORY:   Family History   Problem Relation Age of Onset     Thyroid Disease Mother      Hypertension Mother      Skin Cancer Mother         MMohs surgery with skin  graft     Cerebrovascular Disease Mother         CVA after endarderectomy-      Diabetes Mother      Depression Mother      Alcoholism Father      Heart Disease Father      Substance Abuse Father              Heart Disease Sister         multiple ablations     Alcoholism Sister      Substance Abuse Sister         Sober     Depression Sister      Substance Abuse Sister         Sober 30 plus years     Depression Sister      Thyroid Disease Sister      Hypertension Maternal Grandmother      Depression Maternal Grandmother      Breast Cancer Paternal Grandmother      Pancreatic Cancer Paternal Grandmother      Prostate Cancer Paternal Grandfather      Cardiovascular Paternal Aunt      Cardiovascular Paternal Uncle      Depression Cousin      Depression Other        SOCIAL HISTORY:  Social History     Tobacco Use     Smoking status: Former     Packs/day: 0.00     Years: 33.00     Pack years: 0.00     Types: Cigarettes     Start date: 1982     Quit date: 2019     Years since quitting: 3.4     Smokeless tobacco: Never   Substance Use Topics     Alcohol use: Yes     Comment: Rarely       MEDICATIONS:  Current Outpatient Medications   Medication Sig Dispense Refill     acetaminophen (TYLENOL) 325 MG tablet Take 2 tablets (650 mg) by mouth every 6 hours as needed for mild pain or fever       albuterol (PROAIR HFA/PROVENTIL HFA/VENTOLIN HFA) 108 (90 Base) MCG/ACT inhaler Inhale 2 puffs into the lungs every 6 hours as needed for shortness of breath / dyspnea or wheezing 18 g 5     ARIPiprazole (ABILIFY) 10 MG tablet Take 1 tablet (10 mg) by mouth daily (Patient taking differently: Take 5 mg by mouth daily) 90 tablet 1     atorvastatin (LIPITOR) 40 MG tablet Take 1 tablet (40 mg) by mouth every evening       cyanocobalamin (VITAMIN B-12) 500 MCG SUBL sublingual tablet Place 2 tablets (1,000 mcg) under the tongue daily       DULoxetine (CYMBALTA) 60 MG capsule Take 1 capsule (60 mg) by mouth daily  "(Patient taking differently: Take 120 mg by mouth daily) 30 capsule 0     folic acid (FOLVITE) 1 MG tablet Take 2 tablets (2 mg) by mouth daily       gabapentin (NEURONTIN) 100 MG capsule Take 1 capsule (100 mg) by mouth 3 times daily 90 capsule 1     insulin syringe-needle U-100 (BD INSULIN SYRINGE ULTRAFINE) 30G X 1/2\" 1 ML miscellaneous For Methotrexate use weekly. (Patient not taking: Reported on 2/23/2023) 12 each 3     lisinopril (ZESTRIL) 5 MG tablet Take 1 tablet (5 mg) by mouth daily 90 tablet 3     melatonin 3 MG tablet Take 1 tablet (3 mg) by mouth nightly as needed for sleep       miconazole (MICATIN) 2 % external cream Apply topically 2 times daily       multivitamin w/minerals (THERA-VIT-M) tablet Take 1 tablet by mouth daily       pantoprazole (PROTONIX) 40 MG EC tablet Take 1 tablet (40 mg) by mouth 2 times daily (before meals)       polyethylene glycol (MIRALAX) 17 GM/Dose powder Take 17 g by mouth daily 510 g      senna-docusate (SENOKOT-S/PERICOLACE) 8.6-50 MG tablet Take 1 tablet by mouth 2 times daily as needed for constipation       VITAMIN D3 25 MCG (1000 UT) tablet TAKE 2 TABLETS BY MOUTH EVERY DAY       warfarin ANTICOAGULANT (COUMADIN) 2.5 MG tablet Take 1 tablet (2.5 mg) by mouth daily 2.5 mg daily except for Monday which takes 5 mg.       Warfarin Therapy Reminder Take 1 each by mouth See Admin Instructions         MED REC REQUIRED  Post Medication Reconciliation Status: discharge medications reconciled and changed, per note/orders         ALLERGIES:  Allergies   Allergen Reactions     Adhesive Tape Blisters     Erythromycin Rash       ROS:  10 point ROS were negative other than the symptoms noted above in the HPI.    PHYSICAL EXAM:  Vital signs were reviewed in the chart.  Vital Signs: /76   Pulse 82   Temp 97.3  F (36.3  C)   Resp 18   Ht 1.676 m (5' 6\")   Wt 128.2 kg (282 lb 9.6 oz)   SpO2 97%   BMI 45.61 kg/m    General: Weak appearing but comfortable and in no acute " distress  HEENT: Conjunctival pallor, no scleral icterus or injection, moist oral mucosa  Cardiovascular: Normal S1, S2, RRR  Respiratory: Lungs clear to auscultation bilaterally  GI: Abdomen soft, non-tender, non-distended, +BS  Extremities: No significant LE edema  Neuro: CX II-XII grossly intact; ROM in all four extremities grossly intact  Psych: Alert and oriented x3; normal affect  Skin: No acute rash    LABORATORY/IMAGING DATA:  All relevant labs and imaging data in [facility] and/or Care Everywhere were personally reviewed today.    Hematology profile (February 24, 2023): White count 9.3, hemoglobin 7.3, MCV 95, platelet count 236,000    Chemistry profile (February 24, 2023): Sodium 144, potassium 4.1, BUN 33.2, creatinine 2.4, calcium 9.3, glucose 87    Most Recent 3 CBC's:  Recent Labs   Lab Test 02/21/23  0726 02/20/23  0910 02/19/23  1212   WBC 14.8* 16.2* 18.1*   HGB 7.6* 8.4* 8.1*   MCV 93 95 94    339 220     Most Recent 3 BMP's:  Recent Labs   Lab Test 02/21/23  0726 02/20/23  0910 02/19/23  1212 02/18/23  0832 02/16/23  0758 02/15/23  0745 02/14/23  0714 02/13/23  0826   NA  --   --   --   --   --  144 142 145   POTASSIUM 4.0 4.1 3.7 4.1   < > 3.9  3.8 3.8 4.0   CHLORIDE  --   --   --   --   --  107 107 110*   CO2  --   --   --   --   --  24 26 26   BUN  --   --   --   --   --  30.1* 31.0* 29.1*   CR  --   --   --  1.92*  --  2.10* 2.30* 2.26*   ANIONGAP  --   --   --   --   --  13 9 9   MARIA INES  --   --   --   --   --  8.6* 8.4* 8.3*   GLC  --   --   --   --   --  85 90 88    < > = values in this interval not displayed.     Most Recent 2 LFT's:Recent Labs   Lab Test 02/13/23  0826 02/12/23  0731   AST 41* 43*   ALT 21 20   ALKPHOS 82 83   BILITOTAL 0.2 <0.2         ASSESSMENT/PLAN:  Neutropenic fever with severe sepsis.  Source of sepsis unclear, likely to panniculitis, mucositis, or possible duodenitis.  Neutropenia due to methotrexate with folate and B12 deficiency.  Blood and urine cultures with no  growth.  She was treated with broad-spectrum antimicrobial and antifungal medications as outlined in HPI.  She is currently afebrile and hemodynamically stable.  Plan:  Monitor fever curve and vitals  Monitor CBC periodically    Type II NSTEMI,  Essential hypertension,  Dyslipidemia.  Demand ischemia due to severe sepsis.  Normal LV systolic function on echocardiogram.  Blood pressure is fairly controlled.  Plan:  Continue atorvastatin 40 mg daily  Continue lisinopril 5 mg daily  No indication for antiplatelet therapy given chronic anticoagulation with warfarin  Monitor blood pressure and cardiac status  Follow-up as outpatient    KRISS, improved,  CKD stage IIIb-IV.  Baseline creatinine 1.5-2.  KRISS in the setting of sepsis and dehydration.  Peak creatinine of 3.25 on January 28.  Renal function has improved  Today's creatinine is 2.4, up from 1.19 on February 18.  Plan:  Avoid NSAIDs and nephrotoxins  Closely monitor renal function  Hold lisinopril if renal function worsens  Follow-up with Dr. Hudson of nephrology on March 27 as recommended    History of massive bilateral pulmonary embolism in September 2019 (s/p suction thrombectomy),  History of PEA in September 2019 due to massive pulmonary embolism, requiring ECMO.  PTA warfarin: 2.5 mg daily except for 5 mg on Mondays.  Today's INR is 1.7.  Plan:  Warfarin 7.5 mg today, 5 mg on Saturday, 5 mg on Sunday, and recheck INR on February 27  Adjust warfarin dose for INR target of 2-3    PAF (in September 2019).    EKG in the hospital revealed normal sinus rhythm.  Patient is not on rate control medications.  PTA warfarin: 2.5 mg daily except for 5 mg on Mondays.  Today's INR is 1.7.  Plan:  Warfarin 7.5 mg today, 5 mg on Saturday, 5 mg on Sunday, and recheck INR on February 27  Adjust warfarin dose for INR target of 2-3    COPD and chronic bronchitis.  Stable from a respiratory standpoint.  Plan:  Continue albuterol 2 puffs every 6 hours PRN  Monitor respiratory  status    Pancytopenia (neutropenia, anemia, thrombocytopenia),  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 (per immunofixation on January 29, 2023).  Due to methotrexate, B12/folate deficiency.  Patient was treated with Neupogen and received multiple transfusion of PRBC and platelets.  She also received leucovorin rescue.  Today's CBC with white count of 9.3, hemoglobin of 7.3, platelet count of 236,000, and MCV of 95.  Plan:  Closely monitor hematology profile   Recheck CBC on February 27  Transfuse PRN for hemoglobin less than 7 or platelets less than 50,000  Follow-up with hematology as directed    B12 deficiency,  Folate deficiency.  Patient was placed on leucovorin rescue in the hospital.  Last B12 level was more than 4000 on February 7.  Plan:  Continue folic acid 2 mg daily  Continue cyanocobalamin 1000 mcg sublingual daily  Monitor folic acid and B12 level as outpatient    Esophagitis,  Duodenitis.  Suspected  Plan:  Continue pantoprazole 40 mg twice daily    Seropositive inflammatory arthritis.  Plan:  Continue to hold methotrexate due to pancytopenia and B12/folate deficiency  Follow-up with rheumatology (Dr. Austin at [facility]) as directed    Depression,  Chronic pain.  Plan:  Continue aripiprazole 10 mg daily  Continue duloxetine 60 mg daily  Continue gabapentin 100 mg 3 times daily  Monitor symptoms  Refer to ACP PRN    Obesity, BMI 35.2,  CYRUS.  Noncompliant with CPAP due to intolerance.  Plan:  Staff to assist with daily care and mobility  Monitor O2 sats    Generalized muscle weakness,  Physical deconditioning.  Profound weakness in the setting of prolonged hospitalization and sepsis.  Plan:  Continue PT/OT evaluation and therapy  Staff to assist with daily care and mobility      INCIDENTAL FINDINGS:  Right adrenal nodule.  Measuring 3 cm, incidental finding on CT abdomen/pelvis on January 29, 2023.  Normal cortisol level at 43.9 on January 29.  Plan:  Follow-up as outpatient        Orders  written by provider at facility:  Warfarin 7.5 mg today, February 24, 5 mg on Saturday, February 25, and 5 mg on Sunday, February 26, DX: Pulmonary embolism  Recheck INR on February 27  CBC on February 27, DX: Anemia  BMP on February 27, DX: Renal failure      Recommendation by provider at facility:  Hold lisinopril if renal function worsens      Total time: 80 minutes including face to face time with the patient, reviewing the chart in EPIC, adjusting medications, ordering warfarin, ordering labs, and documenting all information electronically.    Disclaimer: This note may contain text created using speech-recognition software and may contain unintended word substitutions.      Electronically signed by:  Brandon No MD

## 2023-02-24 ENCOUNTER — TRANSITIONAL CARE UNIT VISIT (OUTPATIENT)
Dept: GERIATRICS | Facility: CLINIC | Age: 63
End: 2023-02-24
Payer: COMMERCIAL

## 2023-02-24 VITALS
HEART RATE: 82 BPM | SYSTOLIC BLOOD PRESSURE: 126 MMHG | OXYGEN SATURATION: 97 % | HEIGHT: 66 IN | BODY MASS INDEX: 45.42 KG/M2 | WEIGHT: 282.6 LBS | RESPIRATION RATE: 18 BRPM | DIASTOLIC BLOOD PRESSURE: 76 MMHG | TEMPERATURE: 97.3 F

## 2023-02-24 DIAGNOSIS — I48.0 PAF (PAROXYSMAL ATRIAL FIBRILLATION) (H): ICD-10-CM

## 2023-02-24 DIAGNOSIS — M62.81 GENERALIZED MUSCLE WEAKNESS: ICD-10-CM

## 2023-02-24 DIAGNOSIS — E27.9 ADRENAL NODULE (H): ICD-10-CM

## 2023-02-24 DIAGNOSIS — K29.80 DUODENITIS: ICD-10-CM

## 2023-02-24 DIAGNOSIS — R50.81 NEUTROPENIC FEVER (H): Primary | ICD-10-CM

## 2023-02-24 DIAGNOSIS — E66.812 CLASS 2 SEVERE OBESITY DUE TO EXCESS CALORIES WITH SERIOUS COMORBIDITY AND BODY MASS INDEX (BMI) OF 35.0 TO 35.9 IN ADULT (H): ICD-10-CM

## 2023-02-24 DIAGNOSIS — E53.8 VITAMIN B12 DEFICIENCY (NON ANEMIC): ICD-10-CM

## 2023-02-24 DIAGNOSIS — G89.29 OTHER CHRONIC PAIN: ICD-10-CM

## 2023-02-24 DIAGNOSIS — R53.81 PHYSICAL DECONDITIONING: ICD-10-CM

## 2023-02-24 DIAGNOSIS — J44.9 CHRONIC OBSTRUCTIVE PULMONARY DISEASE, UNSPECIFIED COPD TYPE (H): ICD-10-CM

## 2023-02-24 DIAGNOSIS — F32.A DEPRESSION, UNSPECIFIED DEPRESSION TYPE: ICD-10-CM

## 2023-02-24 DIAGNOSIS — A41.9 SEVERE SEPSIS (H): ICD-10-CM

## 2023-02-24 DIAGNOSIS — D61.811 OTHER DRUG-INDUCED PANCYTOPENIA (H): ICD-10-CM

## 2023-02-24 DIAGNOSIS — N17.9 ACUTE KIDNEY INJURY (H): ICD-10-CM

## 2023-02-24 DIAGNOSIS — E66.01 CLASS 2 SEVERE OBESITY DUE TO EXCESS CALORIES WITH SERIOUS COMORBIDITY AND BODY MASS INDEX (BMI) OF 35.0 TO 35.9 IN ADULT (H): ICD-10-CM

## 2023-02-24 DIAGNOSIS — R65.20 SEVERE SEPSIS (H): ICD-10-CM

## 2023-02-24 DIAGNOSIS — D70.9 NEUTROPENIC FEVER (H): Primary | ICD-10-CM

## 2023-02-24 DIAGNOSIS — N18.32 STAGE 3B CHRONIC KIDNEY DISEASE (H): ICD-10-CM

## 2023-02-24 DIAGNOSIS — I21.A1 TYPE 2 MYOCARDIAL INFARCTION (H): ICD-10-CM

## 2023-02-24 DIAGNOSIS — M19.90 INFLAMMATORY ARTHRITIS: ICD-10-CM

## 2023-02-24 DIAGNOSIS — Z86.711 HISTORY OF PULMONARY EMBOLISM: ICD-10-CM

## 2023-02-24 DIAGNOSIS — E53.8 FOLATE DEFICIENCY: ICD-10-CM

## 2023-02-24 DIAGNOSIS — G47.33 OSA (OBSTRUCTIVE SLEEP APNEA): ICD-10-CM

## 2023-02-24 DIAGNOSIS — I10 ESSENTIAL HYPERTENSION: ICD-10-CM

## 2023-02-24 LAB
ANION GAP SERPL CALCULATED.3IONS-SCNC: 8 MMOL/L (ref 7–15)
BUN SERPL-MCNC: 33.2 MG/DL (ref 8–23)
CALCIUM SERPL-MCNC: 9.3 MG/DL (ref 8.8–10.2)
CHLORIDE SERPL-SCNC: 110 MMOL/L (ref 98–107)
CREAT SERPL-MCNC: 2.4 MG/DL (ref 0.51–0.95)
DEPRECATED HCO3 PLAS-SCNC: 26 MMOL/L (ref 22–29)
ERYTHROCYTE [DISTWIDTH] IN BLOOD BY AUTOMATED COUNT: 18.1 % (ref 10–15)
GFR SERPL CREATININE-BSD FRML MDRD: 22 ML/MIN/1.73M2
GLUCOSE SERPL-MCNC: 87 MG/DL (ref 70–99)
HCT VFR BLD AUTO: 23.6 % (ref 35–47)
HGB BLD-MCNC: 7.3 G/DL (ref 11.7–15.7)
INR (EXTERNAL): 1.7 (ref 0.9–1.1)
MCH RBC QN AUTO: 29.3 PG (ref 26.5–33)
MCHC RBC AUTO-ENTMCNC: 30.9 G/DL (ref 31.5–36.5)
MCV RBC AUTO: 95 FL (ref 78–100)
PLATELET # BLD AUTO: 236 10E3/UL (ref 150–450)
POTASSIUM SERPL-SCNC: 4.1 MMOL/L (ref 3.4–5.3)
RBC # BLD AUTO: 2.49 10E6/UL (ref 3.8–5.2)
SODIUM SERPL-SCNC: 144 MMOL/L (ref 136–145)
WBC # BLD AUTO: 9.3 10E3/UL (ref 4–11)

## 2023-02-24 PROCEDURE — 99306 1ST NF CARE HIGH MDM 50: CPT | Performed by: INTERNAL MEDICINE

## 2023-02-24 PROCEDURE — 85027 COMPLETE CBC AUTOMATED: CPT | Performed by: NURSE PRACTITIONER

## 2023-02-24 PROCEDURE — P9604 ONE-WAY ALLOW PRORATED TRIP: HCPCS | Performed by: NURSE PRACTITIONER

## 2023-02-24 PROCEDURE — 99418 PROLNG IP/OBS E/M EA 15 MIN: CPT | Performed by: INTERNAL MEDICINE

## 2023-02-24 PROCEDURE — 80048 BASIC METABOLIC PNL TOTAL CA: CPT | Performed by: NURSE PRACTITIONER

## 2023-02-24 PROCEDURE — 36415 COLL VENOUS BLD VENIPUNCTURE: CPT | Performed by: NURSE PRACTITIONER

## 2023-02-24 NOTE — LETTER
2/24/2023        RE: Shira Rodriguez  [address]  [facility] [address]        [place] [facility]  INITIAL VISIT NOTE  February 24, 2023    PRIMARY CARE PROVIDER AND CLINIC:  Anjel Busby [address] [facility] / [facility] [address]    CHIEF COMPLAINT:  Hospital follow-up/Initial visit    HPI:    Shira Rodriguez is a 62 year old  (1960) female who was seen at [place] [facility] on February 24, 2023 for an initial visit.     Medical history is notable for hypertension, dyslipidemia, COPD, CKD, seropositive inflammatory arthritis, massive pulmonary embolism with PEA, PAF, depression, obesity, and CYRUS.    Summary of hospital course:  Patient was hospitalized at [facility] from January 28 through February 21, 2023 for severe sepsis.  She originally presented after she was found unresponsive in her home, and noted hypotensive, febrile, and pancytopenic. Blood work was significant for white count of 0.4, hemoglobin of 9.4, and platelet count of 59,000.  UA was negative for leukocyte esterase or nitrite.  Troponin I was mildly elevated at 48 with no trajectory.  EKG showed sinus tachycardia.  CT head was negative for acute intracranial process.  CT chest/abdomen/pelvis demonstrated areas of mostly linear scarring and a/or atelectasis in the lungs, greatest in the left lung base, stable chronic appearing small left lower effusion, small amount of fluid adjacent to distal thoracic esophagus (of uncertain significance), scattered nonspecific posterior mediastinal lymph nodes, cholelithiasis, question small amount of edema around the second and third portions of duodenum, and incidental 3 cm right adrenal nodule.  She was started on broad-spectrum antibiotic therapy and resuscitated with IV fluids.  Urine culture grew mixture of urogenital hiwot.  Blood cultures yielded no growth.  Screening for COVID-19, RSV, and influenza was negative.  1, 3 beta D  glucan Fungitell was positive (unclear significance).  Tests for Blastomyces, histoplasma, CMV, and herpes type I and II were negative.  Serology for Anaplasma, Ehrlichia, and Rickettsia was negative.  EBV nuclear antigen was more than 600 but EBV capsid IgM was less than 10 and EBV DNA was not detected.  Source of sepsis felt to be due to panniculitis (abdomen and breast areas) versus mucositis versus possible duodenitis, in the setting of neutropenia.  Notably, transesophageal echocardiogram showed no evidence for vegetations.  She completed 5-day course of doxycycline, 7-day course of fluconazole, and IV cefepime from January 30 through February 8, 2023.   She was treated with nystatin swish and swallow for mucocutaneous candidiasis.  B12 level was less than 150 and Folate level was low at 2.8.  She was started on leucovorin rescue.  Peripheral smear showed moderate to marked pancytopenia.  Flow cytometry was remarkable for prototypical B cells, no aberrant immunophenotype on T cells, and rare to absent CD34 positive blasts.  There was elevated kappa and lambda free light chains, monoclonal IgM immunoglobulin of lambda light chain type, elevated IgM of 395, and normal IgG and IgA levels.  Immunological work-up showed negative ANCA and ds-DNA and normal C3 and C4.  Bone marrow biopsy on February 3 showed no clear etiology but no evidence for neoplasm or aplastic anemia.  Hypocellularity was attributed primarily to methotrexate and B12/folate deficiency.  She was transfused with 1 unit of PRBC on January 31 and February 4.  She received daily platelet transfusion from January 30 through February 5 for platelet count less than 50,000.  She was treated with Neupogen from January 30 through February 7.  Renal function improved with IV hydration.  TCU was recommended per therapies for prolonged weakness and deconditioning.  At discharge, she had white count of 14.8, hemoglobin of 7.6, and platelet count of  269,000.    Patient is admitted to this facility for medical management, nursing care, and rehab.     Of note, history was obtained from patient, facility RN, and extensive review of the chart.    Today's visit:  Patient was seen in her room, while lying in bed.  She appears weak and frail but comfortable.  She believes that she is getting stronger with therapies.  She had a bowel movement yesterday.  She denies fever, chills, chest pain, palpitation, dyspnea, nausea, vomiting, abdominal pain, or urinary symptoms.  She states that she is intolerant of CPAP.  Today's INR is subtherapeutic at 1.7.      CODE STATUS:   CPR/Full code     PAST MEDICAL HISTORY:   Neutropenic fever with severe sepsis in February 2023 (unclear source of sepsis, likely due to panniculitis, mucositis, or possible duodenitis)   Type II NSTEMI in February 2023, due to severe sepsis  Massive bilateral pulmonary embolism in September 2019 (s/p suction thrombectomy)  PEA in September 2019 due to massive pulmonary embolism, requiring ECMO  PAF in September 2019  Hypertension  Dyslipidemia  COPD/chronic bronchitis  CKD stage IIIb-IV, baseline creatinine 1.5-2  Candidal intertrigo  Pancytopenia (due to methotrexate, B12/folate deficiency)  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, per immunofixation on January 29, 2023  B12 deficiency  Folate deficiency  Esophagitis  Duodenitis  Mucositis   Hyperuricemia  Right adrenal nodule, measuring 3 cm, incidental finding on CT abdomen/pelvis on January 29, 2023  Cholelithiasis  Colonic diverticulosis  Seropositive inflammatory arthritis  Osteoarthritis  Depression  Obesity, BMI 35.2  CYRUS; noncompliant with CPAP due to intolerance      Past Medical History:   Diagnosis Date     Acute massive pulmonary embolism (H) 2019     Cardiac arrest (H) 2019    Due to PE; required ECMO     Chronic bronchitis (H) 07/30/2015     Chronic pain      Contact dermatitis      COPD (chronic obstructive pulmonary disease)  (H)      Depressive disorder 1985     Eczema     HANDS     Elevated liver enzymes      H/O total knee replacement, left      History of total hip replacement 10/27/2011     Hyperlipidemia      Hypertension      Morbid obesity (H)      Osteoarthrosis     right knee     Paroxysmal atrial fibrillation (H)      RA (rheumatoid arthritis) (H) 8/2018     Sleep apnea      Tobacco use disorder      Varicella 1965       PAST SURGICAL HISTORY:   Past Surgical History:   Procedure Laterality Date     ARTHROPLASTY KNEE  6/14/2012    Procedure: ARTHROPLASTY KNEE;  Left Total Knee Arthroplasty;  Surgeon: Annette Quesada MD;  Location: [facility] OR     ARTHROSCOPY HIP Right      BONE MARROW BIOPSY, BONE SPECIMEN, NEEDLE/TROCAR N/A 2/3/2023    Procedure: bone marrow biopsy;  Surgeon: So Horowitz MD;  Location:  GI     BRONCHOSCOPY FLEXIBLE AND RIGID N/A 9/17/2019    Procedure: Bronchoscopy flexible;  Surgeon: Patrick Rayo MD;  Location: [facility] OR     COLONOSCOPY N/A 7/16/2021    Procedure: COLONOSCOPY, WITH POLYPECTOMY AND BIOPSY;  Surgeon: Diogo Lynch MD;  Location:  GI     CV EXTRACORPERAL MEMBRANE OXYGENATION N/A 9/9/2019    Procedure: Extracorporeal Membrance Oxygenation;  Surgeon: Kaleb Mcfarlane MD;  Location:  [facility]ATH LAB     INSERT EXTRACORPORAL MEMBRANE OXYGENATOR N/A 9/17/2019    Procedure: Venous-Venous cannulation using ultrasound guidance and transesophageal echocardiogram, venous-arterial ecmo decannulation and repair of left femoral artery;  Surgeon: Patrick Rayo MD;  Location: [facility] OR     IR MECH THROMB PRIM ART, NON-INTRACRNL  9/10/2019     IR PULMONARY ANGIOGRAM BILATERAL  9/10/2019     IR THROMBOLYSIS ARTERIAL INFUSION INITIAL DAY  9/10/2019     THORACENTESIS N/A 1/16/2020    Procedure: THORACENTESIS;  Surgeon: Georgina Richardson MD;  Location: [facility]     ZZC TOTAL HIP ARTHROPLASTY  07/09/04    RT       FAMILY HISTORY:   Family History   Problem Relation  Age of Onset     Thyroid Disease Mother      Hypertension Mother      Skin Cancer Mother         MMohs surgery with skin graft     Cerebrovascular Disease Mother         CVA after endarderectomy-      Diabetes Mother      Depression Mother      Alcoholism Father      Heart Disease Father      Substance Abuse Father              Heart Disease Sister         multiple ablations     Alcoholism Sister      Substance Abuse Sister         Sober     Depression Sister      Substance Abuse Sister         Sober 30 plus years     Depression Sister      Thyroid Disease Sister      Hypertension Maternal Grandmother      Depression Maternal Grandmother      Breast Cancer Paternal Grandmother      Pancreatic Cancer Paternal Grandmother      Prostate Cancer Paternal Grandfather      Cardiovascular Paternal Aunt      Cardiovascular Paternal Uncle      Depression Cousin      Depression Other        SOCIAL HISTORY:  Social History     Tobacco Use     Smoking status: Former     Packs/day: 0.00     Years: 33.00     Pack years: 0.00     Types: Cigarettes     Start date: 1982     Quit date: 2019     Years since quitting: 3.4     Smokeless tobacco: Never   Substance Use Topics     Alcohol use: Yes     Comment: Rarely       MEDICATIONS:  Current Outpatient Medications   Medication Sig Dispense Refill     acetaminophen (TYLENOL) 325 MG tablet Take 2 tablets (650 mg) by mouth every 6 hours as needed for mild pain or fever       albuterol (PROAIR HFA/PROVENTIL HFA/VENTOLIN HFA) 108 (90 Base) MCG/ACT inhaler Inhale 2 puffs into the lungs every 6 hours as needed for shortness of breath / dyspnea or wheezing 18 g 5     ARIPiprazole (ABILIFY) 10 MG tablet Take 1 tablet (10 mg) by mouth daily (Patient taking differently: Take 5 mg by mouth daily) 90 tablet 1     atorvastatin (LIPITOR) 40 MG tablet Take 1 tablet (40 mg) by mouth every evening       cyanocobalamin (VITAMIN B-12) 500 MCG SUBL sublingual tablet Place 2  "tablets (1,000 mcg) under the tongue daily       DULoxetine (CYMBALTA) 60 MG capsule Take 1 capsule (60 mg) by mouth daily (Patient taking differently: Take 120 mg by mouth daily) 30 capsule 0     folic acid (FOLVITE) 1 MG tablet Take 2 tablets (2 mg) by mouth daily       gabapentin (NEURONTIN) 100 MG capsule Take 1 capsule (100 mg) by mouth 3 times daily 90 capsule 1     insulin syringe-needle U-100 (BD INSULIN SYRINGE ULTRAFINE) 30G X 1/2\" 1 ML miscellaneous For Methotrexate use weekly. (Patient not taking: Reported on 2/23/2023) 12 each 3     lisinopril (ZESTRIL) 5 MG tablet Take 1 tablet (5 mg) by mouth daily 90 tablet 3     melatonin 3 MG tablet Take 1 tablet (3 mg) by mouth nightly as needed for sleep       miconazole (MICATIN) 2 % external cream Apply topically 2 times daily       multivitamin w/minerals (THERA-VIT-M) tablet Take 1 tablet by mouth daily       pantoprazole (PROTONIX) 40 MG EC tablet Take 1 tablet (40 mg) by mouth 2 times daily (before meals)       polyethylene glycol (MIRALAX) 17 GM/Dose powder Take 17 g by mouth daily 510 g      senna-docusate (SENOKOT-S/PERICOLACE) 8.6-50 MG tablet Take 1 tablet by mouth 2 times daily as needed for constipation       VITAMIN D3 25 MCG (1000 UT) tablet TAKE 2 TABLETS BY MOUTH EVERY DAY       warfarin ANTICOAGULANT (COUMADIN) 2.5 MG tablet Take 1 tablet (2.5 mg) by mouth daily 2.5 mg daily except for Monday which takes 5 mg.       Warfarin Therapy Reminder Take 1 each by mouth See Admin Instructions         MED REC REQUIRED  Post Medication Reconciliation Status: discharge medications reconciled and changed, per note/orders         ALLERGIES:  Allergies   Allergen Reactions     Adhesive Tape Blisters     Erythromycin Rash       ROS:  10 point ROS were negative other than the symptoms noted above in the HPI.    PHYSICAL EXAM:  Vital signs were reviewed in the chart.  Vital Signs: /76   Pulse 82   Temp 97.3  F (36.3  C)   Resp 18   Ht 1.676 m (5' 6\")  "  Wt 128.2 kg (282 lb 9.6 oz)   SpO2 97%   BMI 45.61 kg/m    General: Weak appearing but comfortable and in no acute distress  HEENT: Conjunctival pallor, no scleral icterus or injection, moist oral mucosa  Cardiovascular: Normal S1, S2, RRR  Respiratory: Lungs clear to auscultation bilaterally  GI: Abdomen soft, non-tender, non-distended, +BS  Extremities: No significant LE edema  Neuro: CX II-XII grossly intact; ROM in all four extremities grossly intact  Psych: Alert and oriented x3; normal affect  Skin: No acute rash    LABORATORY/IMAGING DATA:  All relevant labs and imaging data in [facility] and/or Care Everywhere were personally reviewed today.    Hematology profile (February 24, 2023): White count 9.3, hemoglobin 7.3, MCV 95, platelet count 236,000    Chemistry profile (February 24, 2023): Sodium 144, potassium 4.1, BUN 33.2, creatinine 2.4, calcium 9.3, glucose 87    Most Recent 3 CBC's:  Recent Labs   Lab Test 02/21/23  0726 02/20/23  0910 02/19/23  1212   WBC 14.8* 16.2* 18.1*   HGB 7.6* 8.4* 8.1*   MCV 93 95 94    339 220     Most Recent 3 BMP's:  Recent Labs   Lab Test 02/21/23  0726 02/20/23  0910 02/19/23  1212 02/18/23  0832 02/16/23  0758 02/15/23  0745 02/14/23  0714 02/13/23  0826   NA  --   --   --   --   --  144 142 145   POTASSIUM 4.0 4.1 3.7 4.1   < > 3.9  3.8 3.8 4.0   CHLORIDE  --   --   --   --   --  107 107 110*   CO2  --   --   --   --   --  24 26 26   BUN  --   --   --   --   --  30.1* 31.0* 29.1*   CR  --   --   --  1.92*  --  2.10* 2.30* 2.26*   ANIONGAP  --   --   --   --   --  13 9 9   MARIA INES  --   --   --   --   --  8.6* 8.4* 8.3*   GLC  --   --   --   --   --  85 90 88    < > = values in this interval not displayed.     Most Recent 2 LFT's:Recent Labs   Lab Test 02/13/23  0826 02/12/23  0731   AST 41* 43*   ALT 21 20   ALKPHOS 82 83   BILITOTAL 0.2 <0.2         ASSESSMENT/PLAN:  Neutropenic fever with severe sepsis.  Source of sepsis unclear, likely to panniculitis, mucositis, or  possible duodenitis.  Neutropenia due to methotrexate with folate and B12 deficiency.  Blood and urine cultures with no growth.  She was treated with broad-spectrum antimicrobial and antifungal medications as outlined in HPI.  She is currently afebrile and hemodynamically stable.  Plan:  Monitor fever curve and vitals  Monitor CBC periodically    Type II NSTEMI,  Essential hypertension,  Dyslipidemia.  Demand ischemia due to severe sepsis.  Normal LV systolic function on echocardiogram.  Blood pressure is fairly controlled.  Plan:  Continue atorvastatin 40 mg daily  Continue lisinopril 5 mg daily  No indication for antiplatelet therapy given chronic anticoagulation with warfarin  Monitor blood pressure and cardiac status  Follow-up as outpatient    KRISS, improved,  CKD stage IIIb-IV.  Baseline creatinine 1.5-2.  KRISS in the setting of sepsis and dehydration.  Peak creatinine of 3.25 on January 28.  Renal function has improved  Today's creatinine is 2.4, up from 1.19 on February 18.  Plan:  Avoid NSAIDs and nephrotoxins  Closely monitor renal function  Follow-up with Dr. Hudson of nephrology on March 27 as recommended    History of massive bilateral pulmonary embolism in September 2019 (s/p suction thrombectomy),  History of PEA in September 2019 due to massive pulmonary embolism, requiring ECMO.  PTA warfarin: 2.5 mg daily except for 5 mg on Mondays.  Today's INR is 1.7.  Plan:  Warfarin 7.5 mg today, 5 mg on Saturday, 5 mg on Sunday, and recheck INR on February 27  Adjust warfarin dose for INR target of 2-3    PAF (in September 2019).    EKG in the hospital revealed normal sinus rhythm.  Patient is not on rate control medications.  PTA warfarin: 2.5 mg daily except for 5 mg on Mondays.  Today's INR is 1.7.  Plan:  Warfarin 7.5 mg today, 5 mg on Saturday, 5 mg on Sunday, and recheck INR on February 27  Adjust warfarin dose for INR target of 2-3    COPD and chronic bronchitis.  Stable from a respiratory  standpoint.  Plan:  Continue albuterol 2 puffs every 6 hours PRN  Monitor respiratory status    Pancytopenia (neutropenia, anemia, thrombocytopenia),  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 (per immunofixation on January 29, 2023).  Due to methotrexate, B12/folate deficiency.  Patient was treated with Neupogen and received multiple transfusion of PRBC and platelets.  She also received leucovorin rescue.  Today's CBC with white count of 9.3, hemoglobin of 7.3, platelet count of 236,000, and MCV of 95.  Plan:  Closely monitor hematology profile   Recheck CBC on February 27  Transfuse PRN for hemoglobin less than 7 or platelets less than 50,000  Follow-up with hematology as directed    B12 deficiency,  Folate deficiency.  Patient was placed on leucovorin rescue in the hospital.  Last B12 level was more than 4000 on February 7.  Plan:  Continue folic acid 2 mg daily  Continue cyanocobalamin 1000 mcg sublingual daily  Monitor folic acid and B12 level as outpatient    Esophagitis,  Duodenitis.  Suspected  Plan:  Continue pantoprazole 40 mg twice daily    Seropositive inflammatory arthritis.  Plan:  Continue to hold methotrexate due to pancytopenia and B12/folate deficiency  Follow-up with rheumatology ([facility] Austin at [facility]) as directed    Depression,  Chronic pain.  Plan:  Continue aripiprazole 10 mg daily  Continue duloxetine 60 mg daily  Continue gabapentin 100 mg 3 times daily  Monitor symptoms  Refer to ACP PRN    Obesity, BMI 35.2,  CYRUS.  Noncompliant with CPAP due to intolerance.  Plan:  Staff to assist with daily care and mobility  Monitor O2 sats    Generalized muscle weakness,  Physical deconditioning.  Profound weakness in the setting of prolonged hospitalization and sepsis.  Plan:  Continue PT/OT evaluation and therapy  Staff to assist with daily care and mobility      INCIDENTAL FINDINGS:  Right adrenal nodule.  Measuring 3 cm, incidental finding on CT abdomen/pelvis on January 29, 2023.  Normal  cortisol level at 43.9 on January 29.  Plan:  Follow-up as outpatient        Orders written by provider at facility:  Warfarin 7.5 mg today, February 24, 5 mg on Saturday, February 25, and 5 mg on Sunday, February 26, DX: Pulmonary embolism  Recheck INR on February 27  CBC on February 27, DX: Anemia  BMP on February 27, DX: Renal failure      Total time: 80 minutes including face to face time with the patient, reviewing the chart in EPIC, adjusting medications, ordering warfarin, ordering labs, and documenting all information electronically.    Disclaimer: This note may contain text created using speech-recognition software and may contain unintended word substitutions.      Electronically signed by:  Brandon No MD                          Sincerely,        Brandon No MD

## 2023-02-26 ENCOUNTER — LAB REQUISITION (OUTPATIENT)
Dept: LAB | Facility: CLINIC | Age: 63
End: 2023-02-26

## 2023-02-26 DIAGNOSIS — N18.9 CHRONIC KIDNEY DISEASE, UNSPECIFIED: ICD-10-CM

## 2023-02-26 DIAGNOSIS — D64.9 ANEMIA, UNSPECIFIED: ICD-10-CM

## 2023-02-27 ENCOUNTER — HOSPITAL ENCOUNTER (OUTPATIENT)
Dept: WOUND CARE | Facility: CLINIC | Age: 63
Discharge: HOME | End: 2023-02-27
Attending: PHYSICIAN ASSISTANT
Payer: COMMERCIAL

## 2023-02-27 DIAGNOSIS — B37.2 INTERTRIGINOUS CANDIDIASIS: ICD-10-CM

## 2023-02-27 LAB
ANION GAP SERPL CALCULATED.3IONS-SCNC: 14 MMOL/L (ref 7–15)
BUN SERPL-MCNC: 35.9 MG/DL (ref 8–23)
CALCIUM SERPL-MCNC: 9.7 MG/DL (ref 8.8–10.2)
CHLORIDE SERPL-SCNC: 107 MMOL/L (ref 98–107)
CREAT SERPL-MCNC: 2.38 MG/DL (ref 0.51–0.95)
DEPRECATED HCO3 PLAS-SCNC: 23 MMOL/L (ref 22–29)
ERYTHROCYTE [DISTWIDTH] IN BLOOD BY AUTOMATED COUNT: 18.6 % (ref 10–15)
GFR SERPL CREATININE-BSD FRML MDRD: 23 ML/MIN/1.73M2
GLUCOSE SERPL-MCNC: 98 MG/DL (ref 70–99)
HCT VFR BLD AUTO: 28.4 % (ref 35–47)
HGB BLD-MCNC: 8.8 G/DL (ref 11.7–15.7)
MCH RBC QN AUTO: 29.4 PG (ref 26.5–33)
MCHC RBC AUTO-ENTMCNC: 31 G/DL (ref 31.5–36.5)
MCV RBC AUTO: 95 FL (ref 78–100)
PLATELET # BLD AUTO: 272 10E3/UL (ref 150–450)
POTASSIUM SERPL-SCNC: 4.3 MMOL/L (ref 3.4–5.3)
RBC # BLD AUTO: 2.99 10E6/UL (ref 3.8–5.2)
SODIUM SERPL-SCNC: 144 MMOL/L (ref 136–145)
WBC # BLD AUTO: 9.6 10E3/UL (ref 4–11)

## 2023-02-27 PROCEDURE — P9604 ONE-WAY ALLOW PRORATED TRIP: HCPCS | Performed by: NURSE PRACTITIONER

## 2023-02-27 PROCEDURE — 85014 HEMATOCRIT: CPT | Performed by: NURSE PRACTITIONER

## 2023-02-27 PROCEDURE — 36415 COLL VENOUS BLD VENIPUNCTURE: CPT | Performed by: NURSE PRACTITIONER

## 2023-02-27 PROCEDURE — 80048 BASIC METABOLIC PNL TOTAL CA: CPT | Performed by: NURSE PRACTITIONER

## 2023-02-27 PROCEDURE — 99304 1ST NF CARE SF/LOW MDM 25: CPT | Performed by: PHYSICIAN ASSISTANT

## 2023-03-02 NOTE — PROGRESS NOTES
WOUND VISIT AT [facility]    ASSESSMENT:   1. intertriginous dermatitis - improving    PLAN/DISCUSSION:   1. Clean skin folds and umbilicus daily and dust with antifungal powder daily and PRN    HISTORY OF PRESENT ILLNESS:   Shira Rodriguez is a 62 year old female who is seen at [facility] today. Was recently hospitalized at [facility] from 1/28-2/21 after being found unresponsive in home. Found to have severe sepsis, likely from panniculitis and severe neutropenia/pancytopenia. Had significant candidal intertrigo with denudement beneath pannus, in groin, beneath breasts and in umbilicus.     Pmhx notable for rheumatoid arthritis on methotrexate PTA, hx of PE on Warfarin, and morbid obesity..    TREATMENT COURSE:  2/27/2023 : Initial visit at [facility].     PHYSICAL EXAM:  GENERAL: Patient is alert and oriented and in no acute distress  INTEGUMENTARY: intact skin in umbilicus, beneath pannus and in groin folds, mild erythema present which is consistent with expected post-inflammatory erythema    MDM: 30 minutes were spent on the date of the visit reviewing previous chart notes, evaluating patient and developing the treatment plan, this excludes any time spent on procedures    Electronically signed by Edwina Londono PA-C on March 1, 2023

## 2023-03-08 ENCOUNTER — DISCHARGE SUMMARY NURSING HOME (OUTPATIENT)
Dept: GERIATRICS | Facility: CLINIC | Age: 63
End: 2023-03-08
Payer: COMMERCIAL

## 2023-03-08 VITALS
SYSTOLIC BLOOD PRESSURE: 131 MMHG | RESPIRATION RATE: 16 BRPM | DIASTOLIC BLOOD PRESSURE: 79 MMHG | BODY MASS INDEX: 45.22 KG/M2 | OXYGEN SATURATION: 98 % | HEIGHT: 66 IN | WEIGHT: 281.4 LBS | HEART RATE: 78 BPM | TEMPERATURE: 98 F

## 2023-03-08 DIAGNOSIS — M25.50 MULTIPLE JOINT PAIN: ICD-10-CM

## 2023-03-08 DIAGNOSIS — E78.2 MIXED HYPERLIPIDEMIA: ICD-10-CM

## 2023-03-08 DIAGNOSIS — Z79.01 LONG TERM CURRENT USE OF ANTICOAGULANT THERAPY: ICD-10-CM

## 2023-03-08 DIAGNOSIS — E66.01 MORBID OBESITY (H): Primary | Chronic | ICD-10-CM

## 2023-03-08 DIAGNOSIS — M06.9 RHEUMATOID ARTHRITIS, INVOLVING UNSPECIFIED SITE, UNSPECIFIED WHETHER RHEUMATOID FACTOR PRESENT (H): ICD-10-CM

## 2023-03-08 DIAGNOSIS — F43.21 ADJUSTMENT DISORDER WITH DEPRESSED MOOD: Chronic | ICD-10-CM

## 2023-03-08 LAB — INR (EXTERNAL): 2.3 (ref 0.9–1.1)

## 2023-03-08 PROCEDURE — 99316 NF DSCHRG MGMT 30 MIN+: CPT | Performed by: NURSE PRACTITIONER

## 2023-03-09 RX ORDER — GABAPENTIN 100 MG/1
100 CAPSULE ORAL 3 TIMES DAILY
Qty: 90 CAPSULE | Refills: 0 | Status: SHIPPED | OUTPATIENT
Start: 2023-03-09 | End: 2023-10-26

## 2023-03-09 RX ORDER — ATORVASTATIN CALCIUM 40 MG/1
40 TABLET, FILM COATED ORAL EVERY EVENING
Qty: 30 TABLET | Refills: 0 | Status: SHIPPED | OUTPATIENT
Start: 2023-03-09 | End: 2024-01-31

## 2023-03-09 NOTE — PROGRESS NOTES
[facility] GERIATRICS DISCHARGE SUMMARY  PATIENT'S NAME: Shira Rodriguez  YOB: 1960  MEDICAL RECORD NUMBER:  [id number]  Place of Service where encounter took place:  [facility] ([facility]) [[id number]]    PRIMARY CARE PROVIDER AND CLINIC RESPONSIBLE AFTER TRANSFER:   Anjel Busby MD, [address] [facility] / [facility] [id number]    [facility] Provider     Transferring providers: Teofilo Ramírez NP, Dr. Oneal MD  Recent Hospitalization/ED:  [facility] stay 1/28/23 to 2/21/23.  Date of SNF Admission: 2/21/23  Date of SNF (anticipated) Discharge: March 11, 2023  Discharged to: previous independent home  Cognitive Scores: SLUMS: 26/30  Physical Function: Ambulating <100 ft with FWW  DME: No new DME needed    CODE STATUS/ADVANCE DIRECTIVES DISCUSSION:  Full Code   ALLERGIES: Adhesive tape and Erythromycin    HPI  This is a 62-year-old female with PMHx for RA on methotrexate, prior massive PE on warfarin, morbid obesity hospitalized with severe sepsis, neutropenia/pancytopenia, WBC 0.4 at admission,  Source panniculitis vs mucositis vs duodenitis. Treated with fluids. ID consult: positive fungitell (significance unclear), positive EBV IgG (but negative IgM and DNA). Treated with course of doxycycline, fluconazole, Neupogen. WBC 47994 at discharge. Hgb 9,4, Plt 59 at admission. Anemia of chronic disease and low B12 and folate levels. Bone marrow biopsy no clear etiology. Treated with 1 units blood and daily platelet transfusions 1/30-2/5. Remains on B12 supplement. KRISS on CKD 4 Cr baseline 2-2.2, on admission 3.25. Improved with fluids, cr stable 1.92 at discharge. Esophagitis/duodenitis resolved, on Protonix. Hypokalemia replaced. Type 2 MI due to sepsis no symptoms. TTE 1/30 normal LV function. Back on coumadin due to hx PE. RA to f/u rheumatology, hold methotrexate. COPD, cough no acute exacerbation. Depression, pain remains on Abilify, Cymbalta and Neurontin. Adrenal  nodule noted on CT, unchanged from previous. Sent to [facility] for rehab.     Summary of nursing facility stay:      Severe sepsis  Acute, unclear etiology. Completed antibiotics. Resolved     NSTEMI  Essential hypertension  Dyslipidemia  Continue atorvastatin 40 mg daily and lisinopril 5 mg daily. -130s     CKD stage 3b  Baseline creatinine 1.5-2. Avoid NSAIDs and nephrotoxins. Last Cr 2.38 with GFR 23 on 2/27    Normocytic hypochromic anemia  Last Hgb 8.8 on 2/27    Pancytopenia   Acute, likely due to methotrexate, B12/folate deficiency. Transfuse PRN for hemoglobin less than 7 or platelets less than 50,000. Follow-up with hematology as directed. Resolved     PAF  HX PE  Chronic. Continue coumadin and INR checks as ordered. Last INR 2.3, continue coumadin 5mg daily, recheck INR with HC on 3/14     Seropositive inflammatory arthritis  Chronic. Continue to hold methotrexate and f/u rheumatology     Insomnia  Continue melatonin 3mg at HS PRN     Depression  Chronic pain  Chronic, stable with aripiprazole 10 mg daily, duloxetine 60 mg daily, gabapentin 100 mg 3 times daily     Morbid obesity  Last BMI 45.4, encourage wt loss    Discharge Medications:    Current Outpatient Medications   Medication Sig Dispense Refill     acetaminophen (TYLENOL) 325 MG tablet Take 2 tablets (650 mg) by mouth every 6 hours as needed for mild pain or fever       albuterol (PROAIR HFA/PROVENTIL HFA/VENTOLIN HFA) 108 (90 Base) MCG/ACT inhaler Inhale 2 puffs into the lungs every 6 hours as needed for shortness of breath / dyspnea or wheezing 18 g 5     ARIPiprazole (ABILIFY) 10 MG tablet Take 1 tablet (10 mg) by mouth daily (Patient taking differently: Take 5 mg by mouth daily) 90 tablet 1     atorvastatin (LIPITOR) 40 MG tablet Take 1 tablet (40 mg) by mouth every evening       cyanocobalamin (VITAMIN B-12) 500 MCG SUBL sublingual tablet Place 2 tablets (1,000 mcg) under the tongue daily       DULoxetine (CYMBALTA) 60 MG capsule  "Take 1 capsule (60 mg) by mouth daily (Patient taking differently: Take 120 mg by mouth daily) 30 capsule 0     folic acid (FOLVITE) 1 MG tablet Take 2 tablets (2 mg) by mouth daily       gabapentin (NEURONTIN) 100 MG capsule Take 1 capsule (100 mg) by mouth 3 times daily 90 capsule 1     insulin syringe-needle U-100 (BD INSULIN SYRINGE ULTRAFINE) 30G X 1/2\" 1 ML miscellaneous For Methotrexate use weekly. (Patient not taking: Reported on 2/23/2023) 12 each 3     lisinopril (ZESTRIL) 5 MG tablet Take 1 tablet (5 mg) by mouth daily 90 tablet 3     melatonin 3 MG tablet Take 1 tablet (3 mg) by mouth nightly as needed for sleep       miconazole (MICATIN) 2 % external cream Apply topically 2 times daily       multivitamin w/minerals (THERA-VIT-M) tablet Take 1 tablet by mouth daily       pantoprazole (PROTONIX) 40 MG EC tablet Take 1 tablet (40 mg) by mouth 2 times daily (before meals)       polyethylene glycol (MIRALAX) 17 GM/Dose powder Take 17 g by mouth daily 510 g      senna-docusate (SENOKOT-S/PERICOLACE) 8.6-50 MG tablet Take 1 tablet by mouth 2 times daily as needed for constipation       VITAMIN D3 25 MCG (1000 UT) tablet TAKE 2 TABLETS BY MOUTH EVERY DAY       warfarin ANTICOAGULANT (COUMADIN) 2.5 MG tablet Take 1 tablet (2.5 mg) by mouth daily 2.5 mg daily except for Monday which takes 5 mg. (Patient taking differently: Take 5 mg by mouth daily Recheck INR on 3/14)       Warfarin Therapy Reminder Take 1 each by mouth See Admin Instructions         Controlled medications:   not applicable/none     Past Medical History:   Past Medical History:   Diagnosis Date     Acute massive pulmonary embolism (H) 2019     Cardiac arrest (H) 2019    Due to PE; required ECMO     Chronic bronchitis (H) 07/30/2015     Chronic pain      Contact dermatitis      COPD (chronic obstructive pulmonary disease) (H)      Depressive disorder 1985     Eczema     HANDS     Elevated liver enzymes      H/O total knee replacement, left      " "History of total hip replacement 10/27/2011     Hyperlipidemia      Hypertension      Morbid obesity (H)      Osteoarthrosis     right knee     Paroxysmal atrial fibrillation (H)      RA (rheumatoid arthritis) (H) 8/2018     Sleep apnea      Tobacco use disorder      Varicella 1965     Physical Exam:   Vitals: /79   Pulse 78   Temp 98  F (36.7  C)   Resp 16   Ht 1.676 m (5' 6\")   Wt 127.6 kg (281 lb 6.4 oz)   SpO2 98%   BMI 45.42 kg/m    BMI: Body mass index is 45.42 kg/m .  GENERAL APPEARANCE:  Alert, in no distress, pleasant, cooperative, denies pain   RESP:  respiratory effort normal, no chest wall tenderness, no respiratory distress, CTA, RA  CV:  Auscultation of heart done, rate and rhythm controlled and regular today, no murmur, no rub or gallop. No edema   PSYCH:  insight and judgement and memory intact, affect and mood normal    SNF labs: see above    DISCHARGE PLAN:    Follow up labs: No labs orders/due    Medical Follow Up:      Follow up with primary care provider in 1-2 weeks    [facility] scheduled appointments:  Next 5 appointments (look out 90 days)    Mar 16, 2023  3:00 PM  (Arrive by 2:45 PM)  Return Visit with Everett Austin MD  [facility] ([address]  [facility] [address]  [phone]   Jun 01, 2023  3:00 PM  (Arrive by 2:45 PM)  Return Visit with Everett Austin MD  [facility] ([facility] ) [address] [place] [address]  [facility] [address]  [phone]           Discharge Services: Home Care:  Occupational Therapy, Physical Therapy, Registered Nurse and Home Health Aide    Discharge Instructions Verbalized to Patient at Discharge:     None    TOTAL DISCHARGE TIME:   Greater than 30 minutes  Electronically signed by:  Teofilo Ramírez NP     Home care Face to Face documentation done in [facility] attached to Home care orders for " [facility].

## 2023-03-13 LAB
ADDITIONAL COMMENTS: NORMAL
CULTURE HARVEST COMPLETE DATE: NORMAL
INTERPRETATION: NORMAL
ISCN: NORMAL
METHODS: NORMAL

## 2023-03-13 NOTE — PROGRESS NOTES
[facility]  Rheumatology Clinic  Everett Austin MD  2023     Name: Shira Rodriguez  MRN: [id number]  Age: 62 year old  : 1960  Referring provider: Anjel Busby     Assessment and Plan:  # Inflammatory Arthritis (+RF/+CCP, dx 2017 with diffuse polyarticular inflammatory flare):   Ms. Rodriguez has no complaints relating to the joints (hands/feet)  that were formerly affected by inflammatory arthritis.  Minimal morning stiffness.  Examination today shows no altered range of motion or synovitis in hands, wrists, elbows.    Data: Blood work in May 2022 showed creatinine of 1.76; transaminases albumin were normal as was TSH.  CBC was normal.  In 2022 rheumatoid factor was greater than 200 international units, increased in titer significantly since last measurement in .  Cyclic citrullinated peptide antibodies were present at 11 units, not significantly different from the low titer observed in 2017.     Inflammatory arthritis is not active today.  Shoulder and knee predominant pain present mechanical, degenerative joint disease generated pain, and would not be expected to respond to immunomodulatory therapy.  No immunomodulatory therapy is warranted presently.  Patient formerly had excellent control of symptoms with methotrexate monotherapy, which had been discontinued from January through 2022.    Unfortunately, recent methotrexate use was associated with severe myelosuppressive effects and marked mucositis, likely contributing to recent prolonged hospitalization for sepsis.  I recommend that methotrexate be avoided indefinitely.  If immunomodulatory therapy is warranted for recurrent inflammatory joint symptoms, I will recommend use of targeted therapy with no myelosuppressive potential, including for example abatacept.    # Knee OA: Exam shows no warmth or redness; pain is likely due to degenerative joint disease.  Last knee injection occurred before 2022.  Plan repeat  corticosteroid injection of the right knee today.    #Shoulder osteoarthritis: Plain x-rays of both shoulders show joint space narrowing of glenohumeral joints and subluxation with inferior migration of the humeral heads.  We discussed my impression that shoulder pain does not reflect active inflammatory arthritis, and that immunomodulatory medication would not likely improve pain in the shoulders.  I recommend a symptomatic local treatment approach, including physical therapy, to be coordinated through sports/orthopedics  # Severe obesity  # CKD3    Discussed plan:    1.  Recent sepsis and cytopenia, potentially related to methotrexate, improving. Do not restart methotrexate.   2.  Inflammatory arthritis: No recent symptoms in the small joints to suggest active inflammatory arthritis.  I recommend watching and waiting to start alternative immunomodulatory medication.  Continue to withhold methotrexate.  3.  Right knee osteoarthritis: Knee injected today with triamcinolone and lidocaine.  Please keep track of the duration of benefit from the injection.  4.  Bilateral shoulder pain: X-rays suggest advanced degenerative arthritis.  I recommend follow-up with sports medicine/orthopedics, physical therapy.      RTC 6 mos    Everett Austin M.D.  Staff Rheumatologist, [facility]  Pager [phone]    PROCEDURE:  JOINT ASPIRATION/INJECTION.         After a discussion of risks, benefits and side effects of procedure, informed patient consent was obtained.       The right knee was prepped and draped in the usual clean fashion (sterile not required for this procedure).  Ethyl chloride spray was used for local analgesia.    ASPIRATION: Not performed.    INJECTION:  Using 2 cc of 1% lidocaine mixed                           with 40 mg of kenalog, the R knee was successfully injected                           without complication.  Patient did not experience some pain                          relief following injection.      HPI:    Shira Rodriguez has a history of rheumatoid arthritis who presents for follow up. I last saw the patient 9-22. Seropositive rheumatoid arthritis was well controlled despite having discontinued methotrexate in spring 2022.  I recommended continued watchful waiting off methotrexate.    Rheumatological history:  Referrred 2/2017 for 3 months of BL hand pain thought consistent with CTS and positive RF/CCP. EMG showed moderate median neuropathy on L and severe on R, MRI c-spine normal. She received R CT injection by sports med with 9 months of relief. She did describe intermittent episodes of swelling of her feet/ankles around this time period as well that resolved with medrol dose pack. She was re-evaluated 8/2017 after developing polyarticular inflammatory arthritis of the right knee, ankles, wrists, hands, and shoulders. She was started on MTX, given depomedrol IM injection, oral prednisone, and given R knee CS injection. MTX increased and oral prednisone tapered to off in 9/2017. Had repeat R CT injection in 10/2017 that lasted until 1/2018. R knee significantly improved following CS injection 12/2017. Has persistent pain in L heel with enthesopathy on imaging, unclear if had true enthesitis in this region due to her RA. Participated in pool therapy winter 2018 with good response. At her 3/2018 OV she continued to have significant pain in her R wrist thought due to CT, but given some ongoing persistent EMS (BL hand stiffness lasting ALL day) as well and her known seropositive disease I recommended adding Humira to her regimen. This was ordered but she never started. Methotrexate Rx continued through 2021, drug stopped as of early 2022. Methotrexate restarted in fall 2022, stopped in January 2023, when patient was admitted to hospital with mucositis and severe cytopenias, severe B12 and folate deficiencies.    Interval history March 16, 2023    Reports that after the last visit, she misunderstood the therapeutic  plan, which involved continued withholding of methotrexate and watchful waiting.  She reports that she continues methotrexate weekly at 20 mg (8 tablets) as directed by prescription.  She used methotrexate right up until hospitalization in January    Patient was admitted to [facility] on January 28, 2023, discharged on February 21, 2023.  Discharge diagnoses include severe sepsis, resolved, acute kidney injury on CKD stage III, resolved.  Upon admission, she had severe neutropenia and pancytopenia, associated with methotrexate use prior to admission.  She was treated with rehydration and empiric antibiotics as well as Neupogen.  Concern about methotrexate use as well as severe B12 and folate deficiency was raised as a cause of neutropenia.  Bone marrow biopsy was negative for malignancy or myelodysplastic disorder.    Today, she reports that she completed a 1 week stay in the transitional care facility, and was discharged 1 week ago.  Since then, she reports joint pain, primarily in both shoulders and the right knee.  Right knee is particularly painful with weightbearing, but also provides night pain.  She has not noticed swelling or warmth. Last injection 6 months ago gave her 3 months of relief.    Shoulders are painful with raising the arms, getting dressed, lifting and reaching.  Pain is much less when shoulders are at rest.  Small joints of the hands and feet have not been painful.  She denies prolonged morning stiffness.  This      Interval history September 29, 2022    She stopped methotrexate 6 months ago because she could not get syringes.  She reports the happy circumstance that former wrist hand and foot swelling stiffness and pain has not recurred despite being off the methotrexate for 6 months.  Morning stiffness is less than 10 minutes.  She has knee pain that is made worse with walking on uneven ground or when navigating stairs.  But no pain or swelling visible in knees or other  joints.  She has not required prednisone or regular use of nonsteroidals since last visit.    Interval history 05-:    Overall joints are doing well.  She has some soreness with flexion of the second and third DIPs in the right hand.  Otherwise there is no swelling, stiffness, redness, or restriction in motion of hand and finger joints.  Knees bother her with prolonged walking, but she looks forward to returning to the pool after the pandemic for aqua exercise.  She believes that the methotrexate has been associated with greater than 80% improvement in joint pain and other symptoms.    Unrelenting fatigue continues.  She notes a new source of exhaustion and that her mother recently had a stroke and the patient is is sharing personal care duties with a sister.    She is remained on methotrexate through the COVID-19 pandemic.  No further oral ulcerations.  She has not been using leucovorin.  She has used folate 3 mg daily.    Prior history  2-2020    Today, the patient reports that she has been doing well since her last visit. She states that her oral lesions resolved with  leucovorin and folic acid. She also notes that her joint pain and decreased range of motion is limited to her bilateral second digits in her hands. Her left second finger is more stiff than her right, and she denies locking or ratcheting in either finger. She notes that her morning stiffness typically resolves in 15 to 30 minutes. She has no significant swelling in her feet. She notes that she has very little swelling in her legs after starting lasix. She denies rash, shortness of breath, or raynaud's flares.     The patient states that she had a cough prior to her pulmonary embolism/myocardial infarction. She then developed pneumonia while intubated and required a thoracentesis while in the hospital. She reports that she had a persistent cough after this procedure and repeat ultrasound noted further fluid for which she had another  "thoracentesis which produced 600 ml of fluid. She notes that her cough is improved after her second thoracentesis but is still present. She states her pulmonologist is concerned her remaining cough could be related to her Methotrexate. She is set up for a repeat PFTs and Chest Xray in the coming months.           Review of Systems:   Pertinent items are noted in HPI or as below, remainder of complete ROS is negative.    No fevers/chills/sweats  No recent problems with hearing or vision. No swallowing problems.   No breathing difficulty, shortness of breath, coughing, or wheezing  No chest pain or palpitations  No heart burn, indigestion, abdominal pain, nausea, vomiting, diarrhea  No urination problems, no bloody, cloudy urine, no dysuria  No numbing, tingling, weakness  No headaches or confusion  No rashes. No easy bleeding or bruising.       Active Medications:   Current Outpatient Medications   Medication Sig     acetaminophen (TYLENOL) 325 MG tablet Take 2 tablets (650 mg) by mouth every 6 hours as needed for mild pain or fever     albuterol (PROAIR HFA/PROVENTIL HFA/VENTOLIN HFA) 108 (90 Base) MCG/ACT inhaler Inhale 2 puffs into the lungs every 6 hours as needed for shortness of breath / dyspnea or wheezing     ARIPiprazole (ABILIFY) 10 MG tablet Take 1 tablet (10 mg) by mouth daily (Patient taking differently: Take 5 mg by mouth daily)     atorvastatin (LIPITOR) 40 MG tablet Take 1 tablet (40 mg) by mouth every evening     cyanocobalamin (VITAMIN B-12) 500 MCG SUBL sublingual tablet Place 2 tablets (1,000 mcg) under the tongue daily     DULoxetine (CYMBALTA) 60 MG capsule Take 1 capsule (60 mg) by mouth daily (Patient taking differently: Take 120 mg by mouth daily)     folic acid (FOLVITE) 1 MG tablet Take 2 tablets (2 mg) by mouth daily     gabapentin (NEURONTIN) 100 MG capsule Take 1 capsule (100 mg) by mouth 3 times daily     insulin syringe-needle U-100 (BD INSULIN SYRINGE ULTRAFINE) 30G X 1/2\" 1 ML " miscellaneous For Methotrexate use weekly. (Patient not taking: Reported on 2/23/2023)     lisinopril (ZESTRIL) 5 MG tablet Take 1 tablet (5 mg) by mouth daily     melatonin 3 MG tablet Take 1 tablet (3 mg) by mouth nightly as needed for sleep     miconazole (MICATIN) 2 % external cream Apply topically 2 times daily     multivitamin w/minerals (THERA-VIT-M) tablet Take 1 tablet by mouth daily     pantoprazole (PROTONIX) 40 MG EC tablet Take 1 tablet (40 mg) by mouth 2 times daily (before meals)     polyethylene glycol (MIRALAX) 17 GM/Dose powder Take 17 g by mouth daily     senna-docusate (SENOKOT-S/PERICOLACE) 8.6-50 MG tablet Take 1 tablet by mouth 2 times daily as needed for constipation     VITAMIN D3 25 MCG (1000 UT) tablet TAKE 2 TABLETS BY MOUTH EVERY DAY     warfarin ANTICOAGULANT (COUMADIN) 2.5 MG tablet Take 1 tablet (2.5 mg) by mouth daily 2.5 mg daily except for Monday which takes 5 mg. (Patient taking differently: Take 5 mg by mouth daily Recheck INR on 3/14)     Warfarin Therapy Reminder Take 1 each by mouth See Admin Instructions     No current facility-administered medications for this visit.     Facility-Administered Medications Ordered in Other Visits   Medication     sodium chloride (PF) 0.9% PF flush 10 mL     Stopped lipitor       Allergies:   Adhesive tape and Erythromycin      Past Medical History:  Depression   Eczema   Hyperlipidemia   Hypertension   Morbid obesity   Osteoarthrosis right knee   Sleep apnea   Tobacco use disorder   Primary localized osteoarthrosis, pelvic region and thigh  Degeneration of cervical intervertebral disc  Chronic bronchitis   Pulmonary embolism   Cardiac arrest; required ECMO 9-2019     Past Surgical History:  Left total knee arthoplasty 6/14/2012   Right hip arthroplasty 07/09/2004      Family History:   Mother: thyroid disease, hypertension, skin cancer, CVA  Paternal grandmother: breast cancer, pancreatic cancer   Father: alcoholism   Sister: thyroid disease,  alcoholism   Maternal grandmother: hypertension   Sister: heart disease, multiple ablations   Paternal grandfather: prostate cancer      Social History:   Current everyday cigarette smoker, 1 pack/day, 33 years, started 1982  No smokeless tobacco use  Occasional alcohol use   Physical Exam:   BP 97/66 (BP Location: Other (Comment), Patient Position: Sitting, Cuff Size: Adult Regular)   Pulse 106   SpO2 98%    Wt Readings from Last 4 Encounters:   03/07/23 121.6 kg (268 lb)   03/08/23 127.6 kg (281 lb 6.4 oz)   02/24/23 128.2 kg (282 lb 9.6 oz)   02/22/23 99.1 kg (218 lb 6.4 oz)     Constitutional: Well-developed, appearing stated age; cooperative, obese  Eyes: Normal EOM, PERRLA, vision, conjunctiva, sclera  ENT: Normal external ears, nose, hearing, lips, teeth, gums, throat. No mucous membrane lesions, normal saliva pool  Neck: No mass or thyroid enlargement  MS: Excellent alignment of MCPs and PIPs.  No tenderness or altered range of motion at MCPs, MTPs, or wrists.  Shoulders with painful flexion beyond 60 degrees.  Tenderness at left anterior shoulder.  Impingement, left greater than right.  Skin: No nail pitting, alopecia, nodules or lesions.    Neuro: Normal cranial nerves  Psych: Normal judgement, orientation, memory, affect.     Laboratory:   RHEUM RESULTS Latest Ref Rng & Units 2/19/2023 2/20/2023 2/21/2023   ALBUMIN 3.4 - 5.0 g/dL - - -   ALBUMIN (R) 3.5 - 5.2 g/dL - - -   ALT 10 - 35 U/L - - -   AST 10 - 35 U/L - - -   COMPLEMENT C3 81 - 157 mg/dL - - -   COMPLEMENT C4 13 - 39 mg/dL - - -   CK TOTAL 26 - 192 U/L - - -   CREATININE 0.51 - 0.95 mg/dL - - -   CRP 0.0 - 8.0 mg/L - - -   DNA <10.0 IU/mL - - -   ILENE <1.0 - - -   GFR ESTIMATE, IF BLACK >60 mL/min/[1.73:m2] - - -   GFR ESTIMATE >60 mL/min/1.73m2 - - -   HEMATOCRIT 35.0 - 47.0 % 25.7(L) 26.9(L) 24.2(L)   HEMOGLOBIN 11.7 - 15.7 g/dL 8.1(L) 8.4(L) 7.6(L)   HEPBANG Nonreactive - - -   HCVAB Nonreactive - - -   IGA 84 - 499 mg/dL - - -   IGM 35 -  242 mg/dL - - -    - 1,616 mg/dL - - -   WBC 4.0 - 11.0 10e3/uL 18.1(H) 16.2(H) 14.8(H)   RBC 3.80 - 5.20 10e6/uL 2.75(L) 2.84(L) 2.60(L)   RDW 10.0 - 15.0 % 17.0(H) 17.1(H) 17.4(H)   MCHC 31.5 - 36.5 g/dL 31.5 31.2(L) 31.4(L)   MCV 78 - 100 fL 94 95 93   PLATELET COUNT 150 - 450 10e3/uL 220 339 269   RHEUMATOID FACTOR <12 IU/mL - - -   ESR 0 - 30 mm/h - - -       Rheumatoid Factor   Date Value Ref Range Status   08/28/2017 51 (H) <20 IU/mL Final     Cyclic Citrullinated Peptide Antibody, IgG   Date Value Ref Range Status   08/28/2017 15 (H) <7 U/mL Final     Comment:     Positive     EMERY Screen by EIA   Date Value Ref Range Status   12/30/2016 <1.0  Interpretation:  Negative   <1.0 Final     Hepatitis B Core Caron   Date Value Ref Range Status   04/12/2017 Nonreactive NR Final     Hep B Surface Agn   Date Value Ref Range Status   04/12/2017 Nonreactive NR Final     IGG   Date Value Ref Range Status   04/21/2017 940 695 - 1,620 mg/dL Final     IGA   Date Value Ref Range Status   04/21/2017 271 70 - 380 mg/dL Final     IGM   Date Value Ref Range Status   04/21/2017 395 (H) 60 - 265 mg/dL Final     Chief Complaint   Patient presents with     RECHECK     Inflammatory arthritis     Blood pressure 97/66, pulse 106, SpO2 98 %, not currently breastfeeding.

## 2023-03-15 ENCOUNTER — TELEPHONE (OUTPATIENT)
Dept: FAMILY MEDICINE | Facility: CLINIC | Age: 63
End: 2023-03-15
Payer: COMMERCIAL

## 2023-03-15 ENCOUNTER — MEDICAL CORRESPONDENCE (OUTPATIENT)
Dept: HEALTH INFORMATION MANAGEMENT | Facility: CLINIC | Age: 63
End: 2023-03-15

## 2023-03-15 ENCOUNTER — ANTICOAGULATION THERAPY VISIT (OUTPATIENT)
Dept: ANTICOAGULATION | Facility: CLINIC | Age: 63
End: 2023-03-15
Payer: COMMERCIAL

## 2023-03-15 ENCOUNTER — TRANSFERRED RECORDS (OUTPATIENT)
Dept: HEALTH INFORMATION MANAGEMENT | Facility: CLINIC | Age: 63
End: 2023-03-15

## 2023-03-15 DIAGNOSIS — D61.818 OTHER PANCYTOPENIA (H): ICD-10-CM

## 2023-03-15 DIAGNOSIS — E53.8 VITAMIN B12 DEFICIENCY (NON ANEMIC): ICD-10-CM

## 2023-03-15 DIAGNOSIS — D51.0 PERNICIOUS ANEMIA: ICD-10-CM

## 2023-03-15 DIAGNOSIS — R50.81 NEUTROPENIC FEVER (H): ICD-10-CM

## 2023-03-15 DIAGNOSIS — Z79.01 LONG TERM CURRENT USE OF ANTICOAGULANT THERAPY: ICD-10-CM

## 2023-03-15 DIAGNOSIS — I26.99 PULMONARY EMBOLI (H): Primary | ICD-10-CM

## 2023-03-15 DIAGNOSIS — E56.9 VITAMIN DEFICIENCY: ICD-10-CM

## 2023-03-15 DIAGNOSIS — D70.9 NEUTROPENIC FEVER (H): ICD-10-CM

## 2023-03-15 DIAGNOSIS — K59.00 CONSTIPATION, UNSPECIFIED CONSTIPATION TYPE: ICD-10-CM

## 2023-03-15 LAB — INR (EXTERNAL): 1.5 (ref 0.9–1.1)

## 2023-03-15 RX ORDER — FOLIC ACID 1 MG/1
2 TABLET ORAL DAILY
Qty: 180 TABLET | Refills: 1 | Status: SHIPPED | OUTPATIENT
Start: 2023-03-15 | End: 2023-09-21

## 2023-03-15 RX ORDER — MULTIPLE VITAMINS W/ MINERALS TAB 9MG-400MCG
1 TAB ORAL DAILY
Qty: 100 TABLET | Refills: 1 | Status: SHIPPED | OUTPATIENT
Start: 2023-03-15 | End: 2024-06-13

## 2023-03-15 RX ORDER — AMOXICILLIN 250 MG
1 CAPSULE ORAL 2 TIMES DAILY PRN
Qty: 180 TABLET | Refills: 1 | Status: SHIPPED | OUTPATIENT
Start: 2023-03-15 | End: 2024-06-13

## 2023-03-15 NOTE — TELEPHONE ENCOUNTER
Spoke with home care nurse and verbal auth of home care order give. Also discussed the medication issues.    Soon-Mi  -------------------------------------------------------------------------    Just an increased risk of bleeding. Not usually clinically significant.     Tashi

## 2023-03-15 NOTE — TELEPHONE ENCOUNTER
[facility] Call Center    Phone Message    May a detailed message be left on voicemail: yes     Reason for Call: Order(s): Home Care Orders: Skilled Nursin time a week for 8 weeks and 3 PRNs, INRs, treatment of yeast rash, eval for PT and OT and social work. Pt needing medications: folic acid, senokot,myralax, Vitamin B12, pantoprazole. Needing to verify dosing on DULoxetine. Vickie wanted to also report interaction between the DULoxetine and warfarin. She is requesting call back asap

## 2023-03-15 NOTE — PROGRESS NOTES
ANTICOAGULATION MANAGEMENT     Shira Rodriguez 62 year old female is on warfarin with subtherapeutic INR result. (Goal INR 2.0-3.0)    Recent labs: (last 7 days)     03/15/23  1027   INR 1.5*       ASSESSMENT       Source(s): Chart Review, Patient/Caregiver Call and Home Care/Facility Nurse       Warfarin doses taken: Missed dose(s) may be affecting INR but patient was receiving more warfarin while hospitalized and in TCU    Diet: patient states she did not have more greens than normal but also does not pay attention to diet    New illness, injury, or hospitalization: Yes: Discharged from TCU on 3/11/23 to recover from severe sepsis    Medication/supplement changes: yes, however, when discharged from the TCU the patient did not receive any Rx's for folic acid or a multivitamin.  patient does not have these on hand    Signs or symptoms of bleeding or clotting: No    Previous INR: Therapeutic last visit; previously outside of goal range    Additional findings: None         PLAN     Recommended plan for no diet, medication or health factor changes affecting INR     Dosing Instructions: booster dose then Increase your warfarin dose (100% change) with next INR in 1 week.  This dose increase is based on what patient was taking PTA since she started receiving 5 mg daily while IP and at the TCU      Summary  As of 3/15/2023    Full warfarin instructions:  3/15: 10 mg; Otherwise 7.5 mg every Sun, Wed; 5 mg all other days   Next INR check:  3/22/2023             Telephone call with Vickie home care nurse who verbalizes understanding and agrees to plan  Detailed voice message left for Shira with dosing instructions and follow up date.   Sent [facility] message with dosing and follow up instructions    Orders given to  Homecare nurse/facility to recheck    Education provided:     Please call back if any changes to your diet, medications or how you've been taking warfarin    Plan made with [facility] Pharmacist Linda  Cristian Graves, RN  Anticoagulation Clinic  3/15/2023    _______________________________________________________________________     Anticoagulation Episode Summary     Current INR goal:  2.0-3.0   TTR:  62.1 % (11.3 mo)   Target end date:  Indefinite   Send INR reminders to:  CHUCKY CARLIN    Indications    Pulmonary emboli (H) [I26.99]  Long term current use of anticoagulant therapy [Z79.01]           Comments:  back to [facility] when discharged         Anticoagulation Care Providers     Provider Role Specialty Phone number    Anjel Busby MD Referring Family Medicine [phone]

## 2023-03-15 NOTE — TELEPHONE ENCOUNTER
Tashi,    Could you review the drug interaction between duloxetine and warfarin? Thank you.    Jennifer

## 2023-03-16 ENCOUNTER — OFFICE VISIT (OUTPATIENT)
Dept: RHEUMATOLOGY | Facility: CLINIC | Age: 63
End: 2023-03-16
Payer: COMMERCIAL

## 2023-03-16 ENCOUNTER — MEDICAL CORRESPONDENCE (OUTPATIENT)
Dept: HEALTH INFORMATION MANAGEMENT | Facility: CLINIC | Age: 63
End: 2023-03-16

## 2023-03-16 VITALS — HEART RATE: 106 BPM | OXYGEN SATURATION: 98 % | SYSTOLIC BLOOD PRESSURE: 97 MMHG | DIASTOLIC BLOOD PRESSURE: 66 MMHG

## 2023-03-16 DIAGNOSIS — M17.11 PRIMARY LOCALIZED OSTEOARTHROSIS OF RIGHT LOWER LEG: Primary | ICD-10-CM

## 2023-03-16 PROCEDURE — 20610 DRAIN/INJ JOINT/BURSA W/O US: CPT | Performed by: INTERNAL MEDICINE

## 2023-03-16 PROCEDURE — 99214 OFFICE O/P EST MOD 30 MIN: CPT | Mod: 25 | Performed by: INTERNAL MEDICINE

## 2023-03-16 RX ORDER — TRIAMCINOLONE ACETONIDE 40 MG/ML
40 INJECTION, SUSPENSION INTRA-ARTICULAR; INTRAMUSCULAR ONCE
Status: COMPLETED | OUTPATIENT
Start: 2023-03-16 | End: 2023-03-16

## 2023-03-16 RX ORDER — LIDOCAINE HYDROCHLORIDE 10 MG/ML
2 INJECTION, SOLUTION INFILTRATION; PERINEURAL ONCE
Status: COMPLETED | OUTPATIENT
Start: 2023-03-16 | End: 2023-03-16

## 2023-03-16 RX ADMIN — LIDOCAINE HYDROCHLORIDE 2 ML: 10 INJECTION, SOLUTION INFILTRATION; PERINEURAL at 16:20

## 2023-03-16 RX ADMIN — TRIAMCINOLONE ACETONIDE 40 MG: 40 INJECTION, SUSPENSION INTRA-ARTICULAR; INTRAMUSCULAR at 16:19

## 2023-03-16 ASSESSMENT — PAIN SCALES - GENERAL: PAINLEVEL: SEVERE PAIN (6)

## 2023-03-16 NOTE — NURSING NOTE
Chief Complaint   Patient presents with     RECHECK     Inflammatory arthritis       Vitals:    03/16/23 1445   BP: 97/66   BP Location: Other (Comment)   Patient Position: Sitting   Cuff Size: Adult Regular   Pulse: 106   SpO2: 98%       There is no height or weight on file to calculate BMI.

## 2023-03-16 NOTE — PATIENT INSTRUCTIONS
Diagnosis:  1.  Recent sepsis and cytopenia, potentially related to methotrexate, improving.  2.  Inflammatory arthritis: No recent symptoms in the small joints to suggest active inflammatory arthritis.  I recommend watching and waiting to start alternative immunomodulatory medication.  Continue to withhold methotrexate.  3.  Right knee osteoarthritis: Knee injected today with triamcinolone and lidocaine.  Please keep track of the duration of benefit from the injection.  4.  Bilateral shoulder pain: X-rays suggest advanced degenerative arthritis.  I recommend follow-up with sports medicine/orthopedics.  I do not expect immunomodulatory medication to affect the course of shoulder pain.  5.  Continue acetaminophen 1000 mg up to 3 times daily for ongoing joint pain.

## 2023-03-17 ENCOUNTER — TELEPHONE (OUTPATIENT)
Dept: FAMILY MEDICINE | Facility: CLINIC | Age: 63
End: 2023-03-17
Payer: COMMERCIAL

## 2023-03-20 NOTE — TELEPHONE ENCOUNTER
Called Sophy and left a secure VM.  Gave verbal orders per Dr. Busby for Order(s): Home Care Orders: Occupational Therapy (OT): 3 visits within 5 week      Oral Carreon CMA (CARLOS) at 8:24 AM on 3/20/2023     
[facility] Call Center    Phone Message    May a detailed message be left on voicemail: yes     Reason for Call: Order(s): Home Care Orders: Occupational Therapy (OT): 3 visits within 5 week, please call with verbal orders thank you.    Action Taken: Message routed to:  [facility] pcc    Travel Screening: Not Applicable                                                                      
DIFFICULTY BREATHING

## 2023-03-21 ENCOUNTER — TELEPHONE (OUTPATIENT)
Dept: FAMILY MEDICINE | Facility: CLINIC | Age: 63
End: 2023-03-21
Payer: COMMERCIAL

## 2023-03-21 NOTE — TELEPHONE ENCOUNTER
[facility] referral from: Transitions of Care (recent hospital discharge or ED visit)    [facility] referral outreach attempt #2 on March 21, 2023 at 12:25 PM      Outcome: Patient not reachable after several attempts, will route to [facility] Pharmacist/Provider as an FYI.  [facility] scheduling number is [phone].  Thank you for the referral.    Jo Whaley, [facility]     Patient has [employer] Part D coverage

## 2023-03-22 ENCOUNTER — ANTICOAGULATION THERAPY VISIT (OUTPATIENT)
Dept: ANTICOAGULATION | Facility: CLINIC | Age: 63
End: 2023-03-22
Payer: COMMERCIAL

## 2023-03-22 DIAGNOSIS — Z79.01 LONG TERM CURRENT USE OF ANTICOAGULANT THERAPY: ICD-10-CM

## 2023-03-22 DIAGNOSIS — I26.99 PULMONARY EMBOLI (H): Primary | ICD-10-CM

## 2023-03-22 LAB — INR (EXTERNAL): 1.4 (ref 0.9–1.1)

## 2023-03-22 NOTE — PROGRESS NOTES
ANTICOAGULATION MANAGEMENT     Shira Rodriguez 62 year old female is on warfarin with subtherapeutic INR result. (Goal INR 2.0-3.0)    Recent labs: (last 7 days)     03/22/23  0000   INR 1.4*       ASSESSMENT       Source(s): Chart Review and Home Care/Facility Nurse Dioni Guerin  [phone]      Warfarin doses taken: Warfarin taken as instructed    Diet: No new diet changes identified    New illness, injury, or hospitalization: No    Medication/supplement changes: started taking Turmeric with black pepper about a week ago - no affect on INR, per micromedex     Signs or symptoms of bleeding or clotting: No    Previous INR: Subtherapeutic    Additional findings: None         PLAN     Recommended plan for no diet, medication or health factor changes affecting INR     Dosing Instructions: booster dose then Increase your warfarin dose (12.5% change) with next INR in 1 week       Summary  As of 3/22/2023    Full warfarin instructions:  3/22: 10 mg; Otherwise 5 mg every Tue, Thu, Sat; 7.5 mg all other days   Next INR check:  3/29/2023             Telephone call with pAoorva home care nurse who verbalizes understanding and agrees to plan    Orders given to  Homecare nurse/facility to recheck    Education provided:     Please call back if any changes to your diet, medications or how you've been taking warfarin    Goal range and lab monitoring: goal range and significance of current result, Importance of therapeutic range and Importance of following up at instructed interval    Symptom monitoring: monitoring for clotting signs and symptoms and monitoring for stroke signs and symptoms    Plan made per [facility] anticoagulation protocol    Vickie Gabriel, RN  Anticoagulation Clinic  3/22/2023    _______________________________________________________________________     Anticoagulation Episode Summary     Current INR goal:  2.0-3.0   TTR:  60.0 % (11.3 mo)   Target end date:  Indefinite   Send INR reminders to:  [facility]    Indications    Pulmonary emboli (H) [I26.99]  Long term current use of anticoagulant therapy [Z79.01]           Comments:  back to [facility] when discharged         Anticoagulation Care Providers     Provider Role Specialty Phone number    Anjel Busby MD Referring Family Medicine [phone]

## 2023-03-24 ENCOUNTER — TELEPHONE (OUTPATIENT)
Dept: FAMILY MEDICINE | Facility: CLINIC | Age: 63
End: 2023-03-24
Payer: COMMERCIAL

## 2023-03-24 NOTE — TELEPHONE ENCOUNTER
[facility] Call Center    Phone Message    May a detailed message be left on voicemail: yes     Reason for Call: Order(s): Home Care Orders: Other: PT 1x a week every other week for 8 weeks    Action Taken: Message routed to:  [facility] pcp    Travel Screening: Not Applicable

## 2023-03-24 NOTE — TELEPHONE ENCOUNTER
Called [facility]. LVM on confidential line. Verbally confirmed Home Care Orders: Other: PT 1x a week every other week for 8 weeks.     Anjel EAGLE, EMT at 8:38 AM on 3/24/2023.  Primary Care Clinic: [phone]

## 2023-03-28 ENCOUNTER — DOCUMENTATION ONLY (OUTPATIENT)
Dept: FAMILY MEDICINE | Facility: CLINIC | Age: 63
End: 2023-03-28
Payer: COMMERCIAL

## 2023-03-28 NOTE — PROGRESS NOTES
Type of Form Received: order    Form Received (Date) 3/28/23   Form Filled out Yes 4/3/23   Placed in provider folder Yes

## 2023-03-29 ENCOUNTER — ANTICOAGULATION THERAPY VISIT (OUTPATIENT)
Dept: ANTICOAGULATION | Facility: CLINIC | Age: 63
End: 2023-03-29
Payer: COMMERCIAL

## 2023-03-29 ENCOUNTER — DOCUMENTATION ONLY (OUTPATIENT)
Dept: FAMILY MEDICINE | Facility: CLINIC | Age: 63
End: 2023-03-29
Payer: COMMERCIAL

## 2023-03-29 DIAGNOSIS — I27.82 CHRONIC PULMONARY EMBOLISM, UNSPECIFIED PULMONARY EMBOLISM TYPE, UNSPECIFIED WHETHER ACUTE COR PULMONALE PRESENT (H): ICD-10-CM

## 2023-03-29 DIAGNOSIS — Z79.01 LONG TERM CURRENT USE OF ANTICOAGULANT THERAPY: ICD-10-CM

## 2023-03-29 DIAGNOSIS — I26.99 PULMONARY EMBOLI (H): Primary | ICD-10-CM

## 2023-03-29 LAB — INR (EXTERNAL): 2.1 (ref 0.9–1.1)

## 2023-03-29 RX ORDER — WARFARIN SODIUM 5 MG/1
TABLET ORAL
Qty: 116 TABLET | Refills: 1 | Status: SHIPPED | OUTPATIENT
Start: 2023-03-29 | End: 2023-09-05

## 2023-03-29 NOTE — PROGRESS NOTES
ANTICOAGULATION MANAGEMENT     Shira Rodriguez 62 year old female is on warfarin with therapeutic INR result. (Goal INR 2.0-3.0)    Recent labs: (last 7 days)     03/29/23  1154   INR 2.1*       ASSESSMENT       Source(s): Chart Review and Home Care/Facility Nurse       Warfarin doses taken: Warfarin taken as instructed    Diet: No new diet changes identified    New illness, injury, or hospitalization: Discharged from [facility]CU 03/11/23.    Medication/supplement changes: None noted    Signs or symptoms of bleeding or clotting: No    Previous INR: Subtherapeutic    Additional findings: Refill needed today. Shira meets all criteria for refill (current [facility] referral, office visit with referring provider/group in last year, lab monitoring up to date or not exceeding 2 weeks overdue). Rx instructions and quantity supplied updated to match patient's current dosing plan. Warfarin 90 day supply with 1 refill granted per [facility] protocol          PLAN     Recommended plan for no diet, medication or health factor changes affecting INR     Dosing Instructions: Continue your current warfarin dose with next INR in 1 week       Summary  As of 3/29/2023    Full warfarin instructions:  5 mg every Tue, Thu, Sat; 7.5 mg all other days   Next INR check:  4/5/2023             Telephone call with Latonia home care nurse who verbalizes understanding and agrees to plan    Orders given to  Homecare nurse/facility to recheck    Education provided:     Goal range and lab monitoring: goal range and significance of current result    Plan made per [facility] anticoagulation protocol    Everett Castellanos RN  Anticoagulation Clinic  3/29/2023    _______________________________________________________________________     Anticoagulation Episode Summary     Current INR goal:  2.0-3.0   TTR:  58.2 % (11.3 mo)   Target end date:  Indefinite   Send INR reminders to:  [facility]    Indications    Pulmonary emboli (H) [I26.99]  Long term current use of anticoagulant  therapy [Z79.01]           Comments:           Anticoagulation Care Providers     Provider Role Specialty Phone number    Anjel Busby MD Referring Family Medicine [phone]

## 2023-03-29 NOTE — PROGRESS NOTES
Type of Form Received: order    Form Received (Date) 3/29/23   Form Filled out Yes 4/3/23   Placed in provider folder Yes

## 2023-03-30 ENCOUNTER — DOCUMENTATION ONLY (OUTPATIENT)
Dept: FAMILY MEDICINE | Facility: CLINIC | Age: 63
End: 2023-03-30
Payer: COMMERCIAL

## 2023-03-30 NOTE — PROGRESS NOTES
Type of Form Received: order    Form Received (Date) 3/30/23   Form Filled out Yes 4/3/23   Placed in provider folder Yes

## 2023-03-31 ENCOUNTER — LAB (OUTPATIENT)
Dept: LAB | Facility: CLINIC | Age: 63
End: 2023-03-31
Payer: COMMERCIAL

## 2023-03-31 ENCOUNTER — OFFICE VISIT (OUTPATIENT)
Dept: FAMILY MEDICINE | Facility: CLINIC | Age: 63
End: 2023-03-31
Payer: COMMERCIAL

## 2023-03-31 ENCOUNTER — MEDICAL CORRESPONDENCE (OUTPATIENT)
Dept: HEALTH INFORMATION MANAGEMENT | Facility: CLINIC | Age: 63
End: 2023-03-31

## 2023-03-31 ENCOUNTER — ANTICOAGULATION THERAPY VISIT (OUTPATIENT)
Dept: ANTICOAGULATION | Facility: CLINIC | Age: 63
End: 2023-03-31

## 2023-03-31 VITALS
BODY MASS INDEX: 43.64 KG/M2 | OXYGEN SATURATION: 95 % | HEART RATE: 96 BPM | DIASTOLIC BLOOD PRESSURE: 78 MMHG | WEIGHT: 270.4 LBS | SYSTOLIC BLOOD PRESSURE: 119 MMHG

## 2023-03-31 DIAGNOSIS — R21 RASH: ICD-10-CM

## 2023-03-31 DIAGNOSIS — Z79.01 LONG TERM CURRENT USE OF ANTICOAGULANT THERAPY: ICD-10-CM

## 2023-03-31 DIAGNOSIS — M06.9 RHEUMATOID ARTHRITIS, INVOLVING UNSPECIFIED SITE, UNSPECIFIED WHETHER RHEUMATOID FACTOR PRESENT (H): ICD-10-CM

## 2023-03-31 DIAGNOSIS — Z09 HOSPITAL DISCHARGE FOLLOW-UP: ICD-10-CM

## 2023-03-31 DIAGNOSIS — I26.99 PULMONARY EMBOLI (H): Primary | ICD-10-CM

## 2023-03-31 DIAGNOSIS — N18.9 CHRONIC KIDNEY DISEASE, UNSPECIFIED CKD STAGE: ICD-10-CM

## 2023-03-31 DIAGNOSIS — M06.9 RHEUMATOID ARTHRITIS, INVOLVING UNSPECIFIED SITE, UNSPECIFIED WHETHER RHEUMATOID FACTOR PRESENT (H): Primary | ICD-10-CM

## 2023-03-31 DIAGNOSIS — D61.818 PANCYTOPENIA (H): ICD-10-CM

## 2023-03-31 DIAGNOSIS — D61.818 OTHER PANCYTOPENIA (H): ICD-10-CM

## 2023-03-31 LAB
ALBUMIN SERPL BCG-MCNC: 3.8 G/DL (ref 3.5–5.2)
ALP SERPL-CCNC: 61 U/L (ref 35–104)
ALT SERPL W P-5'-P-CCNC: 9 U/L (ref 10–35)
ANION GAP SERPL CALCULATED.3IONS-SCNC: 13 MMOL/L (ref 7–15)
AST SERPL W P-5'-P-CCNC: 15 U/L (ref 10–35)
BASOPHILS # BLD AUTO: 0 10E3/UL (ref 0–0.2)
BASOPHILS NFR BLD AUTO: 0 %
BILIRUB SERPL-MCNC: 0.3 MG/DL
BUN SERPL-MCNC: 31.4 MG/DL (ref 8–23)
CALCIUM SERPL-MCNC: 10.1 MG/DL (ref 8.8–10.2)
CHLORIDE SERPL-SCNC: 109 MMOL/L (ref 98–107)
CREAT SERPL-MCNC: 1.72 MG/DL (ref 0.51–0.95)
DEPRECATED HCO3 PLAS-SCNC: 19 MMOL/L (ref 22–29)
EOSINOPHIL # BLD AUTO: 0.1 10E3/UL (ref 0–0.7)
EOSINOPHIL NFR BLD AUTO: 1 %
ERYTHROCYTE [DISTWIDTH] IN BLOOD BY AUTOMATED COUNT: 14.8 % (ref 10–15)
GFR SERPL CREATININE-BSD FRML MDRD: 33 ML/MIN/1.73M2
GLUCOSE SERPL-MCNC: 102 MG/DL (ref 70–99)
HCT VFR BLD AUTO: 32.9 % (ref 35–47)
HGB BLD-MCNC: 10.5 G/DL (ref 11.7–15.7)
IMM GRANULOCYTES # BLD: 0 10E3/UL
IMM GRANULOCYTES NFR BLD: 0 %
INR PPP: 1.74 (ref 0.85–1.15)
LYMPHOCYTES # BLD AUTO: 1.2 10E3/UL (ref 0.8–5.3)
LYMPHOCYTES NFR BLD AUTO: 11 %
MCH RBC QN AUTO: 29.9 PG (ref 26.5–33)
MCHC RBC AUTO-ENTMCNC: 31.9 G/DL (ref 31.5–36.5)
MCV RBC AUTO: 94 FL (ref 78–100)
MONOCYTES # BLD AUTO: 0.6 10E3/UL (ref 0–1.3)
MONOCYTES NFR BLD AUTO: 6 %
NEUTROPHILS # BLD AUTO: 8.5 10E3/UL (ref 1.6–8.3)
NEUTROPHILS NFR BLD AUTO: 82 %
NRBC # BLD AUTO: 0 10E3/UL
NRBC BLD AUTO-RTO: 0 /100
PLATELET # BLD AUTO: 302 10E3/UL (ref 150–450)
POTASSIUM SERPL-SCNC: 4.7 MMOL/L (ref 3.4–5.3)
PROT SERPL-MCNC: 7.8 G/DL (ref 6.4–8.3)
RBC # BLD AUTO: 3.51 10E6/UL (ref 3.8–5.2)
SODIUM SERPL-SCNC: 141 MMOL/L (ref 136–145)
WBC # BLD AUTO: 10.5 10E3/UL (ref 4–11)

## 2023-03-31 PROCEDURE — 36415 COLL VENOUS BLD VENIPUNCTURE: CPT | Performed by: PATHOLOGY

## 2023-03-31 PROCEDURE — 80053 COMPREHEN METABOLIC PANEL: CPT | Performed by: PATHOLOGY

## 2023-03-31 PROCEDURE — 85025 COMPLETE CBC W/AUTO DIFF WBC: CPT | Performed by: PATHOLOGY

## 2023-03-31 PROCEDURE — 99215 OFFICE O/P EST HI 40 MIN: CPT | Performed by: FAMILY MEDICINE

## 2023-03-31 PROCEDURE — 85610 PROTHROMBIN TIME: CPT | Performed by: PATHOLOGY

## 2023-03-31 RX ORDER — NYSTATIN 100000 [USP'U]/G
POWDER TOPICAL 4 TIMES DAILY PRN
Qty: 60 G | Refills: 1 | Status: SHIPPED | OUTPATIENT
Start: 2023-03-31 | End: 2024-06-13

## 2023-03-31 NOTE — PROGRESS NOTES
"  Assessment & Plan     Rheumatoid arthritis, involving unspecified site, unspecified whether rheumatoid factor present (H)  RA is not flaring now  - CBC with platelets and differential; Future  - Comprehensive metabolic panel (BMP + Alb, Alk Phos, ALT, AST, Total. Bili, TP); Future    Rash  Under folds off and on  - nystatin (MYCOSTATIN) 163547 UNIT/GM external powder; Apply topically 4 times daily as needed    Hospital discharge follow-up  Reviewed at length    Chronic kidney disease, unspecified CKD stage  Recheck    Pancytopenia (H)  Improving        42 minutes spent by me on the date of the encounter doing chart review, history and exam, documentation and further activities per the note    BMI:   Estimated body mass index is 43.64 kg/m  as calculated from the following:    Height as of 3/8/23: 1.676 m (5' 6\").    Weight as of this encounter: 122.7 kg (270 lb 6.4 oz).     No follow-ups on file.    Anjel Busby MD  [facility] [place]    Jacky Silva is a 62 year old, presenting for the following health issues:  Hospital F/U (Pt here for hospital follow up)  No flowsheet data found.  HPI Here in f/u  Long inpt stay m t x toxic and septic, reviewed/discussed at length, she is retired RN so medically sophisticated  Then rehab stay also in [facility], rvwd  Just saw Rheum, notes rvwd, autoimmune state minimal now so not on meds  UTD: mammogram pap colon shots  Slowly stronger, has help in home, PCA/private plus HHN/OT/PT  Taking po well, normal bowel bladder fcn  No acute c/o  No new c/o since home or since saw Rheum  CKD: we agree to monitor, if worse she will see Dr Hudson/Tiffanie Baron rvwd (list in rehab discharge note)  Past Medical History:   Diagnosis Date     Acute massive pulmonary embolism (H) 2019     Cardiac arrest (H) 2019    Due to PE; required ECMO     Chronic bronchitis (H) 07/30/2015     Chronic pain      Contact dermatitis      COPD (chronic obstructive " pulmonary disease) (H)      Depressive disorder 1985     Eczema     HANDS     Elevated liver enzymes      H/O total knee replacement, left      History of total hip replacement 10/27/2011     Hyperlipidemia      Hypertension      Morbid obesity (H)      Osteoarthrosis     right knee     Paroxysmal atrial fibrillation (H)      RA (rheumatoid arthritis) (H) 8/2018     Sleep apnea      Tobacco use disorder      Varicella 1965     Past Surgical History:   Procedure Laterality Date     ARTHROPLASTY KNEE  6/14/2012    Procedure: ARTHROPLASTY KNEE;  Left Total Knee Arthroplasty;  Surgeon: Annette Quesada MD;  Location: [facility] OR     ARTHROSCOPY HIP Right      BONE MARROW BIOPSY, BONE SPECIMEN, NEEDLE/TROCAR N/A 2/3/2023    Procedure: bone marrow biopsy;  Surgeon: So Horowitz MD;  Location:  GI     BRONCHOSCOPY FLEXIBLE AND RIGID N/A 9/17/2019    Procedure: Bronchoscopy flexible;  Surgeon: Patrick Rayo MD;  Location: [facility]     COLONOSCOPY N/A 7/16/2021    Procedure: COLONOSCOPY, WITH POLYPECTOMY AND BIOPSY;  Surgeon: Diogo Lynch MD;  Location:  GI     CV EXTRACORPERAL MEMBRANE OXYGENATION N/A 9/9/2019    Procedure: Extracorporeal Membrance Oxygenation;  Surgeon: Kaleb Mcfarlane MD;  Location:  [facility]     INSERT EXTRACORPORAL MEMBRANE OXYGENATOR N/A 9/17/2019    Procedure: Venous-Venous cannulation using ultrasound guidance and transesophageal echocardiogram, venous-arterial ecmo decannulation and repair of left femoral artery;  Surgeon: Patrick Rayo MD;  Location: [facility] OR     IR MECH THROMB PRIM ART, NON-INTRACRNL  9/10/2019     IR PULMONARY ANGIOGRAM BILATERAL  9/10/2019     IR THROMBOLYSIS ARTERIAL INFUSION INITIAL DAY  9/10/2019     THORACENTESIS N/A 1/16/2020    Procedure: THORACENTESIS;  Surgeon: Georgina Richardson MD;  Location:  GI     ZZC TOTAL HIP ARTHROPLASTY  07/09/04    RT     Current Outpatient Medications   Medication     acetaminophen  "(TYLENOL) 325 MG tablet     albuterol (PROAIR HFA/PROVENTIL HFA/VENTOLIN HFA) 108 (90 Base) MCG/ACT inhaler     ARIPiprazole (ABILIFY) 10 MG tablet     atorvastatin (LIPITOR) 40 MG tablet     cyanocobalamin (VITAMIN B-12) 500 MCG SUBL sublingual tablet     DULoxetine (CYMBALTA) 60 MG capsule     folic acid (FOLVITE) 1 MG tablet     gabapentin (NEURONTIN) 100 MG capsule     lisinopril (ZESTRIL) 5 MG tablet     melatonin 3 MG tablet     miconazole (MICATIN) 2 % external cream     multivitamin w/minerals (THERA-VIT-M) tablet     nystatin (MYCOSTATIN) 250458 UNIT/GM external powder     pantoprazole (PROTONIX) 40 MG EC tablet     senna-docusate (SENOKOT-S/PERICOLACE) 8.6-50 MG tablet     VITAMIN D3 25 MCG (1000 UT) tablet     warfarin ANTICOAGULANT (COUMADIN) 5 MG tablet     Warfarin Therapy Reminder     insulin syringe-needle U-100 (BD INSULIN SYRINGE ULTRAFINE) 30G X 1/2\" 1 ML miscellaneous     polyethylene glycol (MIRALAX) 17 GM/Dose powder     No current facility-administered medications for this visit.     Facility-Administered Medications Ordered in Other Visits   Medication     sodium chloride (PF) 0.9% PF flush 10 mL     Allergies   Allergen Reactions     Adhesive Tape Blisters     Erythromycin Rash             Review of Systems         Objective    /78 (BP Location: Right arm, Patient Position: Sitting, Cuff Size: Adult Regular)   Pulse 96   Wt 122.7 kg (270 lb 6.4 oz)   SpO2 95%   BMI 43.64 kg/m    Body mass index is 43.64 kg/m .  Physical Exam   GENERAL: healthy, alert and no distress, using walker  NECK: no adenopathy, no asymmetry, masses, or scars and thyroid normal to palpation  RESP: lungs clear to auscultation - no rales, rhonchi or wheezes  CV: regular rate and rhythm, normal S1 S2, no S3 or S4, no murmur, click or rub, no peripheral edema and peripheral pulses strong  ABDOMEN: soft, nontender, no hepatosplenomegaly, no masses and bowel sounds normal  MS: no gross musculoskeletal defects " noted, no edema

## 2023-03-31 NOTE — NURSING NOTE
Shira Rodriguez is a 62 year old female that presents in clinic today for the following:     Chief Complaint   Patient presents with     Hospital F/U     Pt here for hospital follow up       The patient's allergies and medications were reviewed. The patient's vitals were obtained without incident. The patient does not have any other questions for the provider.     Lester Wang, EMT at 1:52 PM on 3/31/2023.  Primary Care Clinic: [phone]

## 2023-03-31 NOTE — PROGRESS NOTES
ANTICOAGULATION MANAGEMENT     Shira Rodriguez 62 year old female is on warfarin with subtherapeutic INR result. (Goal INR 2.0-3.0)    Recent labs: (last 7 days)     03/31/23  1439   INR 1.74*       ASSESSMENT       Source(s): Patient/Caregiver Call       Warfarin doses taken: Warfarin taken as instructed    Diet: No new diet changes identified    New illness, injury, or hospitalization: No    Medication/supplement changes: None noted    Signs or symptoms of bleeding or clotting: No    Previous INR: Therapeutic last visit; previously outside of goal range    Additional findings: None         PLAN     Recommended plan for no diet, medication or health factor changes affecting INR     Dosing Instructions: Increase your warfarin dose (5.6% change) with next INR in 5 days       Summary  As of 3/31/2023    Full warfarin instructions:  5 mg every Tue, Thu; 7.5 mg all other days   Next INR check:  4/5/2023             Telephone call with Shira who verbalizes understanding and agrees to plan and who agrees to plan and repeated back plan correctly    Orders given to  Homecare nurse/facility to recheck    Education provided:     None required    Plan made per [facility] anticoagulation protocol    Patience Martins RN  Anticoagulation Clinic  3/31/2023    _______________________________________________________________________     Anticoagulation Episode Summary     Current INR goal:  2.0-3.0   TTR:  57.8 % (11.3 mo)   Target end date:  Indefinite   Send INR reminders to:  [facility]    Indications    Pulmonary emboli (H) [I26.99]  Long term current use of anticoagulant therapy [Z79.01]           Comments:           Anticoagulation Care Providers     Provider Role Specialty Phone number    Anjel Busby MD Referring Family Medicine [phone]

## 2023-04-05 ENCOUNTER — ANTICOAGULATION THERAPY VISIT (OUTPATIENT)
Dept: ANTICOAGULATION | Facility: CLINIC | Age: 63
End: 2023-04-05
Payer: COMMERCIAL

## 2023-04-05 DIAGNOSIS — Z79.01 LONG TERM CURRENT USE OF ANTICOAGULANT THERAPY: ICD-10-CM

## 2023-04-05 DIAGNOSIS — I26.99 PULMONARY EMBOLI (H): Primary | ICD-10-CM

## 2023-04-05 LAB — INR (EXTERNAL): 1.3 (ref 0.9–1.1)

## 2023-04-05 NOTE — PROGRESS NOTES
Incoming call from [place] with [facility] care for Shira Rodriguez    Requesting for: call back regarding INR result of 1.3        Call back number: [phone]    Routing to Anticoagulation Pool for Follow up

## 2023-04-05 NOTE — PROGRESS NOTES
ANTICOAGULATION MANAGEMENT     Shira Rodriguez 62 year old female is on warfarin with subtherapeutic INR result. (Goal INR 2.0-3.0)    Recent labs: (last 7 days)     04/05/23  0000   INR 1.3*       ASSESSMENT       Source(s): Chart Review and Home Care/Facility Nurse       Warfarin doses taken: Warfarin taken as instructed    Diet: No new diet changes identified    New illness, injury, or hospitalization: No    Medication/supplement changes: None noted    Signs or symptoms of bleeding or clotting: No    Previous INR: Subtherapeutic    Additional findings: None         PLAN     Recommended plan for no diet, medication or health factor changes affecting INR     Dosing Instructions: booster dose then Increase your warfarin dose (10.5% change) with next INR in 5-7 days       Summary  As of 4/5/2023    Full warfarin instructions:  4/5: 12.5 mg; Otherwise 7.5 mg every day   Next INR check:  4/12/2023             Telephone call with Neeta home care nurse who verbalizes understanding and agrees to plan    Orders given to  Homecare nurse/facility to recheck    Education provided:     Goal range and lab monitoring: goal range and significance of current result and Importance of therapeutic range    Plan made per [facility] anticoagulation protocol    Francis Ellis RN  Anticoagulation Clinic  4/5/2023    _______________________________________________________________________     Anticoagulation Episode Summary     Current INR goal:  2.0-3.0   TTR:  56.3 % (11.3 mo)   Target end date:  Indefinite   Send INR reminders to:  [facility]    Indications    Pulmonary emboli (H) [I26.99]  Long term current use of anticoagulant therapy [Z79.01]           Comments:           Anticoagulation Care Providers     Provider Role Specialty Phone number    Anjel Busby MD Referring Family Medicine [phone]

## 2023-04-06 ENCOUNTER — DOCUMENTATION ONLY (OUTPATIENT)
Dept: FAMILY MEDICINE | Facility: CLINIC | Age: 63
End: 2023-04-06
Payer: COMMERCIAL

## 2023-04-06 NOTE — PROGRESS NOTES
Type of Form Received: order    Form Received (Date) 4/6/23   Form Filled out Yes 4/7/23   Placed in provider folder Yes

## 2023-04-07 ENCOUNTER — MEDICAL CORRESPONDENCE (OUTPATIENT)
Dept: HEALTH INFORMATION MANAGEMENT | Facility: CLINIC | Age: 63
End: 2023-04-07
Payer: COMMERCIAL

## 2023-04-12 ENCOUNTER — ANTICOAGULATION THERAPY VISIT (OUTPATIENT)
Dept: ANTICOAGULATION | Facility: CLINIC | Age: 63
End: 2023-04-12
Payer: COMMERCIAL

## 2023-04-12 DIAGNOSIS — Z79.01 LONG TERM CURRENT USE OF ANTICOAGULANT THERAPY: ICD-10-CM

## 2023-04-12 DIAGNOSIS — I26.99 PULMONARY EMBOLI (H): Primary | ICD-10-CM

## 2023-04-12 LAB — INR (EXTERNAL): 2.4 (ref 0.9–1.1)

## 2023-04-12 NOTE — PROGRESS NOTES
ANTICOAGULATION MANAGEMENT     Shira Rodriguez 62 year old female is on warfarin with therapeutic INR result. (Goal INR 2.0-3.0)    Recent labs: (last 7 days)     04/12/23  1235   INR 2.4*       ASSESSMENT       Source(s): Chart Review and Home Care/Facility Nurse       Warfarin doses taken: Booster dose(s) recently taken which may be affecting INR    Diet: No new diet changes identified    New illness, injury, or hospitalization: No    Medication/supplement changes: None noted    Signs or symptoms of bleeding or clotting: No    Previous INR: Subtherapeutic    Additional findings: None         PLAN     Recommended plan for ongoing change(s) affecting INR     Dosing Instructions: Continue your current warfarin dose with next INR in 1 week       Summary  As of 4/12/2023    Full warfarin instructions:  7.5 mg every day   Next INR check:  4/19/2023             Detailed voice message left for Kelly home care nurse with dosing instructions and follow up date.     Orders given to  Homecare nurse/facility to recheck    Education provided:     Contact [phone] with any changes, questions or concerns.     Plan made per [facility] anticoagulation protocol    YUNIER DAVIDSON RN  Anticoagulation Clinic  4/12/2023    _______________________________________________________________________     Anticoagulation Episode Summary     Current INR goal:  2.0-3.0   TTR:  55.1 % (11.3 mo)   Target end date:  Indefinite   Send INR reminders to:  [facility]    Indications    Pulmonary emboli (H) [I26.99]  Long term current use of anticoagulant therapy [Z79.01]           Comments:           Anticoagulation Care Providers     Provider Role Specialty Phone number    Anjel Busby MD Referring Family Medicine [phone]

## 2023-04-13 ENCOUNTER — TELEPHONE (OUTPATIENT)
Dept: FAMILY MEDICINE | Facility: CLINIC | Age: 63
End: 2023-04-13
Payer: COMMERCIAL

## 2023-04-13 ENCOUNTER — DOCUMENTATION ONLY (OUTPATIENT)
Dept: FAMILY MEDICINE | Facility: CLINIC | Age: 63
End: 2023-04-13
Payer: COMMERCIAL

## 2023-04-13 NOTE — TELEPHONE ENCOUNTER
[facility] Call Center    Phone Message    May a detailed message be left on voicemail: yes     Reason for Call: Other: Kelly [facility]- calling regarding her seeing the patient yesterday. Patient reported that she tripped and fell on bathroom rug on 04/11/2023. No injuries and was able to get herself back up.     Action Taken: Message routed to:  [facility] pcc    Travel Screening: Not Applicable

## 2023-04-15 ENCOUNTER — MEDICAL CORRESPONDENCE (OUTPATIENT)
Dept: HEALTH INFORMATION MANAGEMENT | Facility: CLINIC | Age: 63
End: 2023-04-15
Payer: COMMERCIAL

## 2023-04-19 ENCOUNTER — ANTICOAGULATION THERAPY VISIT (OUTPATIENT)
Dept: ANTICOAGULATION | Facility: CLINIC | Age: 63
End: 2023-04-19
Payer: COMMERCIAL

## 2023-04-19 DIAGNOSIS — I26.99 PULMONARY EMBOLI (H): Primary | ICD-10-CM

## 2023-04-19 DIAGNOSIS — Z79.01 LONG TERM CURRENT USE OF ANTICOAGULANT THERAPY: ICD-10-CM

## 2023-04-19 LAB — INR (EXTERNAL): 2.9 (ref 0.9–1.1)

## 2023-04-19 NOTE — PROGRESS NOTES
ANTICOAGULATION MANAGEMENT     Shira Rodriguez 62 year old female is on warfarin with therapeutic INR result. (Goal INR 2.0-3.0)    Recent labs: (last 7 days)     04/19/23  0000   INR 2.9*       ASSESSMENT       Source(s): Chart Review, Patient/Caregiver Call and Home Care/Facility Nurse       Warfarin doses taken: Warfarin taken as instructed    Diet: No new diet changes identified    Medication/supplement changes: None noted    New illness, injury, or hospitalization: No    Signs or symptoms of bleeding or clotting: No    Previous INR: Therapeutic last visit; previously outside of goal range    Additional findings: None         PLAN     Recommended plan for no diet, medication or health factor changes affecting INR     Dosing Instructions: Continue your current warfarin dose with next INR in 1 week       Summary  As of 4/19/2023    Full warfarin instructions:  7.5 mg every day   Next INR check:  4/26/2023             Telephone call with pt and shaquille,  home care nurse who verbalizes understanding and agrees to plan and who agrees to plan and repeated back plan correctly    Orders given to  Homecare nurse/facility to recheck    Education provided:     Goal range and lab monitoring: goal range and significance of current result    Plan made per [facility] anticoagulation protocol    Jess Gomez RN  Anticoagulation Clinic  4/19/2023    _______________________________________________________________________     Anticoagulation Episode Summary     Current INR goal:  2.0-3.0   TTR:  55.1 % (11.3 mo)   Target end date:  Indefinite   Send INR reminders to:  [facility]    Indications    Pulmonary emboli (H) [I26.99]  Long term current use of anticoagulant therapy [Z79.01]           Comments:           Anticoagulation Care Providers     Provider Role Specialty Phone number    Anjel Busby MD Referring Family Medicine [phone]

## 2023-04-20 ENCOUNTER — MYC MEDICAL ADVICE (OUTPATIENT)
Dept: FAMILY MEDICINE | Facility: CLINIC | Age: 63
End: 2023-04-20
Payer: COMMERCIAL

## 2023-04-21 ENCOUNTER — DOCUMENTATION ONLY (OUTPATIENT)
Dept: FAMILY MEDICINE | Facility: CLINIC | Age: 63
End: 2023-04-21
Payer: COMMERCIAL

## 2023-04-21 NOTE — PROGRESS NOTES
Type of Form Received:     Form Received (Date) 4/21/23   Form Filled out Yes 4/25/23   Placed in provider folder Yes

## 2023-04-23 ENCOUNTER — MEDICAL CORRESPONDENCE (OUTPATIENT)
Dept: HEALTH INFORMATION MANAGEMENT | Facility: CLINIC | Age: 63
End: 2023-04-23
Payer: COMMERCIAL

## 2023-04-24 ENCOUNTER — MEDICAL CORRESPONDENCE (OUTPATIENT)
Dept: HEALTH INFORMATION MANAGEMENT | Facility: CLINIC | Age: 63
End: 2023-04-24
Payer: COMMERCIAL

## 2023-04-27 ENCOUNTER — ANTICOAGULATION THERAPY VISIT (OUTPATIENT)
Dept: ANTICOAGULATION | Facility: CLINIC | Age: 63
End: 2023-04-27
Payer: COMMERCIAL

## 2023-04-27 DIAGNOSIS — Z79.01 LONG TERM CURRENT USE OF ANTICOAGULANT THERAPY: ICD-10-CM

## 2023-04-27 DIAGNOSIS — I26.99 PULMONARY EMBOLI (H): Primary | ICD-10-CM

## 2023-04-27 LAB — INR (EXTERNAL): 2.5 (ref 0.9–1.1)

## 2023-04-27 NOTE — PROGRESS NOTES
ANTICOAGULATION MANAGEMENT     Shira Rodriguez 63 year old female is on warfarin with therapeutic INR result. (Goal INR 2.0-3.0)    Recent labs: (last 7 days)     04/27/23  1315   INR 2.5*       ASSESSMENT       Source(s): Chart Review, Patient/Caregiver Call and Home Care/Facility Nurse       Warfarin doses taken: Warfarin taken as instructed    Diet: No new diet changes identified    Medication/supplement changes: None noted    New illness, injury, or hospitalization: No    Signs or symptoms of bleeding or clotting: No    Previous result: Therapeutic last 2(+) visits    Additional findings: None         PLAN     Recommended plan for no diet, medication or health factor changes affecting INR     Dosing Instructions: Continue your current warfarin dose with next INR in 1 week       Summary  As of 4/27/2023    Full warfarin instructions:  7.5 mg every day   Next INR check:  5/4/2023             Telephone call with Apoorva home care nurse who verbalizes understanding and agrees to plan and who agrees to plan and repeated back plan correctly    Orders given to  Homecare nurse/facility to recheck    Education provided:     None required    Plan made per [facility] anticoagulation protocol    Rich Lopez, RN  Anticoagulation Clinic  4/27/2023    _______________________________________________________________________     Anticoagulation Episode Summary     Current INR goal:  2.0-3.0   TTR:  55.0 % (11.3 mo)   Target end date:  Indefinite   Send INR reminders to:  [facility]    Indications    Pulmonary emboli (H) [I26.99]  Long term current use of anticoagulant therapy [Z79.01]           Comments:           Anticoagulation Care Providers     Provider Role Specialty Phone number    Anjel Busby MD Referring Family Medicine [phone]

## 2023-04-28 ENCOUNTER — DOCUMENTATION ONLY (OUTPATIENT)
Dept: FAMILY MEDICINE | Facility: CLINIC | Age: 63
End: 2023-04-28
Payer: COMMERCIAL

## 2023-04-28 NOTE — PROGRESS NOTES
Type of Form Received:     Form Received (Date) 4/28/23   Form Filled out Yes 5/1/23   Placed in provider folder Yes

## 2023-05-04 ENCOUNTER — ANTICOAGULATION THERAPY VISIT (OUTPATIENT)
Dept: ANTICOAGULATION | Facility: CLINIC | Age: 63
End: 2023-05-04
Payer: COMMERCIAL

## 2023-05-04 DIAGNOSIS — Z79.01 LONG TERM CURRENT USE OF ANTICOAGULANT THERAPY: ICD-10-CM

## 2023-05-04 DIAGNOSIS — I26.99 PULMONARY EMBOLI (H): Primary | ICD-10-CM

## 2023-05-04 LAB — INR (EXTERNAL): 2.1 (ref 0.9–1.1)

## 2023-05-04 NOTE — PROGRESS NOTES
ANTICOAGULATION MANAGEMENT     Shira Rodriguez 63 year old female is on warfarin with therapeutic INR result. (Goal INR 2.0-3.0)    Recent labs: (last 7 days)     05/04/23  0000   INR 2.1*       ASSESSMENT       Source(s): Chart Review, Patient/Caregiver Call and Home Care/Facility Nurse       Warfarin doses taken: Warfarin taken as instructed    Diet: No new diet changes identified    Medication/supplement changes: None noted    New illness, injury, or hospitalization: No    Signs or symptoms of bleeding or clotting: No    Previous result: Therapeutic last 2(+) visits    Additional findings: INR trending down         PLAN     Recommended plan for no diet, medication or health factor changes affecting INR     Dosing Instructions: Continue your current warfarin dose with next INR in 1 week       Summary  As of 5/4/2023    Full warfarin instructions:  7.5 mg every day   Next INR check:  5/10/2023             Telephone call with [facility] care nurse who verbalizes understanding and agrees to plan and who agrees to plan and repeated back plan correctly    Orders given to  Homecare nurse/facility to recheck    Education provided:     None required    Plan made per [facility] anticoagulation protocol    Mariah Sellers, RN  Anticoagulation Clinic  5/4/2023    _______________________________________________________________________     Anticoagulation Episode Summary     Current INR goal:  2.0-3.0   TTR:  55.8 % (11.3 mo)   Target end date:  Indefinite   Send INR reminders to:  [facility]    Indications    Pulmonary emboli (H) [I26.99]  Long term current use of anticoagulant therapy [Z79.01]           Comments:           Anticoagulation Care Providers     Provider Role Specialty Phone number    Anjel Busby MD Referring Family Medicine [phone]

## 2023-05-05 ENCOUNTER — DOCUMENTATION ONLY (OUTPATIENT)
Dept: FAMILY MEDICINE | Facility: CLINIC | Age: 63
End: 2023-05-05
Payer: COMMERCIAL

## 2023-05-05 NOTE — PROGRESS NOTES
Type of Form Received:     Form Received (Date) 5/5/23   Form Filled out Yes 5/10/23   Placed in provider folder Yes

## 2023-05-08 ENCOUNTER — OFFICE VISIT (OUTPATIENT)
Dept: INTERNAL MEDICINE | Facility: CLINIC | Age: 63
End: 2023-05-08
Payer: COMMERCIAL

## 2023-05-08 ENCOUNTER — LAB (OUTPATIENT)
Dept: LAB | Facility: CLINIC | Age: 63
End: 2023-05-08
Payer: COMMERCIAL

## 2023-05-08 VITALS
OXYGEN SATURATION: 95 % | WEIGHT: 266.2 LBS | SYSTOLIC BLOOD PRESSURE: 124 MMHG | BODY MASS INDEX: 42.97 KG/M2 | DIASTOLIC BLOOD PRESSURE: 76 MMHG | HEART RATE: 105 BPM

## 2023-05-08 DIAGNOSIS — L65.9 LOSS OF HAIR: ICD-10-CM

## 2023-05-08 DIAGNOSIS — L21.9 SEBORRHEIC DERMATITIS: ICD-10-CM

## 2023-05-08 DIAGNOSIS — L65.9 LOSS OF HAIR: Primary | ICD-10-CM

## 2023-05-08 LAB
ERYTHROCYTE [DISTWIDTH] IN BLOOD BY AUTOMATED COUNT: 13.3 % (ref 10–15)
FERRITIN SERPL-MCNC: 561 NG/ML (ref 11–328)
HCT VFR BLD AUTO: 35.2 % (ref 35–47)
HGB BLD-MCNC: 11.5 G/DL (ref 11.7–15.7)
IRON BINDING CAPACITY (ROCHE): 198 UG/DL (ref 240–430)
IRON SATN MFR SERPL: 15 % (ref 15–46)
IRON SERPL-MCNC: 29 UG/DL (ref 37–145)
MCH RBC QN AUTO: 29.5 PG (ref 26.5–33)
MCHC RBC AUTO-ENTMCNC: 32.7 G/DL (ref 31.5–36.5)
MCV RBC AUTO: 90 FL (ref 78–100)
PLATELET # BLD AUTO: 321 10E3/UL (ref 150–450)
RBC # BLD AUTO: 3.9 10E6/UL (ref 3.8–5.2)
TSH SERPL DL<=0.005 MIU/L-ACNC: 1.81 UIU/ML (ref 0.3–4.2)
WBC # BLD AUTO: 6.1 10E3/UL (ref 4–11)

## 2023-05-08 PROCEDURE — 82728 ASSAY OF FERRITIN: CPT | Performed by: PATHOLOGY

## 2023-05-08 PROCEDURE — 99213 OFFICE O/P EST LOW 20 MIN: CPT | Performed by: HOSPITALIST

## 2023-05-08 PROCEDURE — 83550 IRON BINDING TEST: CPT | Performed by: PATHOLOGY

## 2023-05-08 PROCEDURE — 84443 ASSAY THYROID STIM HORMONE: CPT | Performed by: PATHOLOGY

## 2023-05-08 PROCEDURE — 85027 COMPLETE CBC AUTOMATED: CPT | Performed by: PATHOLOGY

## 2023-05-08 PROCEDURE — 36415 COLL VENOUS BLD VENIPUNCTURE: CPT | Performed by: PATHOLOGY

## 2023-05-08 PROCEDURE — 83540 ASSAY OF IRON: CPT | Performed by: PATHOLOGY

## 2023-05-08 RX ORDER — KETOCONAZOLE 20 MG/ML
SHAMPOO TOPICAL DAILY PRN
Qty: 120 ML | Refills: 2 | Status: SHIPPED | OUTPATIENT
Start: 2023-05-08 | End: 2023-10-26

## 2023-05-08 ASSESSMENT — ANXIETY QUESTIONNAIRES
3. WORRYING TOO MUCH ABOUT DIFFERENT THINGS: SEVERAL DAYS
5. BEING SO RESTLESS THAT IT IS HARD TO SIT STILL: NOT AT ALL
1. FEELING NERVOUS, ANXIOUS, OR ON EDGE: SEVERAL DAYS
GAD7 TOTAL SCORE: 4
6. BECOMING EASILY ANNOYED OR IRRITABLE: NOT AT ALL
IF YOU CHECKED OFF ANY PROBLEMS ON THIS QUESTIONNAIRE, HOW DIFFICULT HAVE THESE PROBLEMS MADE IT FOR YOU TO DO YOUR WORK, TAKE CARE OF THINGS AT HOME, OR GET ALONG WITH OTHER PEOPLE: SOMEWHAT DIFFICULT
GAD7 TOTAL SCORE: 4
2. NOT BEING ABLE TO STOP OR CONTROL WORRYING: SEVERAL DAYS
7. FEELING AFRAID AS IF SOMETHING AWFUL MIGHT HAPPEN: SEVERAL DAYS

## 2023-05-08 ASSESSMENT — ENCOUNTER SYMPTOMS
CHILLS: 0
FEVER: 0
SHORTNESS OF BREATH: 0

## 2023-05-08 ASSESSMENT — PATIENT HEALTH QUESTIONNAIRE - PHQ9
SUM OF ALL RESPONSES TO PHQ QUESTIONS 1-9: 12
5. POOR APPETITE OR OVEREATING: NOT AT ALL

## 2023-05-08 NOTE — PATIENT INSTRUCTIONS
- Check labs for CBC, Iron labs, Thyroid.   - Start on Ketoconazole shampoo to use at least 3 times a week for now.   - May consider obtaining a shampoo with 1% Selenium (Selsun blue or Head and Shoulders) in between use of Ketoconazole shampoo.   - Referral to Dermatology.   - Continue current medications.       Follow up again with Dr. Busby on 5/24/23.

## 2023-05-08 NOTE — NURSING NOTE
Shira Rodriguez is a 63 year old female patient that presents today in clinic for the following:    Chief Complaint   Patient presents with     Hair/Scalp Problem     Hair loss per pt onset about 3 weeks/month ago      The patient's allergies and medications were reviewed as noted. A set of vitals were recorded as noted without incident: /76 (BP Location: Right arm, Patient Position: Sitting, Cuff Size: Adult Regular)   Pulse 105   Wt 120.7 kg (266 lb 3.2 oz)   SpO2 95%   BMI 42.97 kg/m  . The patient does not have any other questions for the provider.    Karen Piedra, EMT at 2:18 PM on 5/8/2023

## 2023-05-08 NOTE — ASSESSMENT & PLAN NOTE
Hair loss likely contributed by Seborrheic dermatitis and recent hospitalization for sepsis.   - Reviewed medication and only med associated with alopecia is warfarin, which she's been on for 3 years. Unlikely at this point to be contributing to hair loss.   - Check labs for CBC, Iron labs, TSH.   - Start on Ketoconazole shampoo to use at least 3 times a week for now.   - May consider obtaining a shampoo with 1% Selenium (Selsun blue or Head and Shoulders) in between use of Ketoconazole shampoo.   - Referral to Dermatology.   - Continue current medications.

## 2023-05-09 ENCOUNTER — MEDICAL CORRESPONDENCE (OUTPATIENT)
Dept: HEALTH INFORMATION MANAGEMENT | Facility: CLINIC | Age: 63
End: 2023-05-09
Payer: COMMERCIAL

## 2023-05-10 ENCOUNTER — ANTICOAGULATION THERAPY VISIT (OUTPATIENT)
Dept: ANTICOAGULATION | Facility: CLINIC | Age: 63
End: 2023-05-10
Payer: COMMERCIAL

## 2023-05-10 DIAGNOSIS — Z79.01 LONG TERM CURRENT USE OF ANTICOAGULANT THERAPY: ICD-10-CM

## 2023-05-10 DIAGNOSIS — I26.99 PULMONARY EMBOLI (H): Primary | ICD-10-CM

## 2023-05-10 LAB — INR (EXTERNAL): 2.8 (ref 0.9–1.1)

## 2023-05-10 NOTE — PROGRESS NOTES
ANTICOAGULATION MANAGEMENT     Shira Rodriguez 63 year old female is on warfarin with therapeutic INR result. (Goal INR 2.0-3.0)    Recent labs: (last 7 days)     05/10/23  0000   INR 2.8*       ASSESSMENT       Source(s): Chart Review and Home Care/Facility Nurse       Warfarin doses taken: Warfarin taken as instructed    Diet: No new diet changes identified    Medication/supplement changes: Patient started Biotin for hair loss    New illness, injury, or hospitalization: No    Signs or symptoms of bleeding or clotting: No    Previous result: Therapeutic last 2(+) visits    Additional findings: None         PLAN     Recommended plan for no diet, medication or health factor changes affecting INR     Dosing Instructions: Continue your current warfarin dose with next INR in 2 weeks       Summary  As of 5/10/2023    Full warfarin instructions:  7.5 mg every day   Next INR check:  5/24/2023             Telephone call with [facility] home care nurse who verbalizes understanding and agrees to plan and who agrees to plan and repeated back plan correctly    Orders given to  Homecare nurse/facility to recheck    Education provided:     None required    Plan made per [facility] anticoagulation protocol    Mariah Sellers RN  Anticoagulation Clinic  5/10/2023    _______________________________________________________________________     Anticoagulation Episode Summary     Current INR goal:  2.0-3.0   TTR:  57.5 % (11.3 mo)   Target end date:  Indefinite   Send INR reminders to:  [facility]    Indications    Pulmonary emboli (H) [I26.99]  Long term current use of anticoagulant therapy [Z79.01]           Comments:           Anticoagulation Care Providers     Provider Role Specialty Phone number    Anjel Busby MD Referring Family Medicine [phone]

## 2023-05-11 ENCOUNTER — TELEPHONE (OUTPATIENT)
Dept: FAMILY MEDICINE | Facility: CLINIC | Age: 63
End: 2023-05-11
Payer: COMMERCIAL

## 2023-05-11 NOTE — TELEPHONE ENCOUNTER
[facility] Call Center    Phone Message    May a detailed message be left on voicemail: yes     Reason for Call: Order(s): Home Care Orders: Skilled Nursinxwk for 2wks, 1x every other wk for 6wks, 1xwk for 1wk    Action Taken: Message routed to:  [facility] pcc    Travel Screening: Not Applicable

## 2023-05-11 NOTE — TELEPHONE ENCOUNTER
Called [facility]RAL, [facility]. LVM on confidential line. Per Dr. Busby, verbally confirmed Home Care Orders: Skilled Nursinxwk for 2wks, 1x every other wk for 6wks, 1xwk for 1wk.     Anjel EAGLE, EMT at 2:49 PM on 2023.  Primary Care Clinic: [phone]

## 2023-05-12 ENCOUNTER — DOCUMENTATION ONLY (OUTPATIENT)
Dept: FAMILY MEDICINE | Facility: CLINIC | Age: 63
End: 2023-05-12
Payer: COMMERCIAL

## 2023-05-12 NOTE — PROGRESS NOTES
Type of Form Received:     Form Received (Date) 5/12/23   Form Filled out Yes 5/17/23   Placed in provider folder Yes

## 2023-05-13 DIAGNOSIS — Z53.9 DIAGNOSIS NOT YET DEFINED: Primary | ICD-10-CM

## 2023-05-13 PROCEDURE — G0179 MD RECERTIFICATION HHA PT: HCPCS | Performed by: FAMILY MEDICINE

## 2023-05-16 ENCOUNTER — MEDICAL CORRESPONDENCE (OUTPATIENT)
Dept: HEALTH INFORMATION MANAGEMENT | Facility: CLINIC | Age: 63
End: 2023-05-16
Payer: COMMERCIAL

## 2023-05-18 ENCOUNTER — ANTICOAGULATION THERAPY VISIT (OUTPATIENT)
Dept: ANTICOAGULATION | Facility: CLINIC | Age: 63
End: 2023-05-18
Payer: COMMERCIAL

## 2023-05-18 DIAGNOSIS — Z79.01 LONG TERM CURRENT USE OF ANTICOAGULANT THERAPY: ICD-10-CM

## 2023-05-18 DIAGNOSIS — I26.99 PULMONARY EMBOLI (H): Primary | ICD-10-CM

## 2023-05-18 LAB — INR (EXTERNAL): 2.2 (ref 0.9–1.1)

## 2023-05-18 NOTE — PROGRESS NOTES
ANTICOAGULATION MANAGEMENT     Shira Rodriguez 63 year old female is on warfarin with therapeutic INR result. (Goal INR 2.0-3.0)    Recent labs: (last 7 days)     05/18/23  0000   INR 2.2*       ASSESSMENT       Source(s): Chart Review, Patient/Caregiver Call and Home Care/Facility Nurse       Warfarin doses taken: Warfarin taken as instructed    Diet: No new diet changes identified    Medication/supplement changes: None noted    New illness, injury, or hospitalization: No    Signs or symptoms of bleeding or clotting: No    Previous result: Therapeutic last 2(+) visits    Additional findings: None         PLAN     Recommended plan for no diet, medication or health factor changes affecting INR     Dosing Instructions: Continue your current warfarin dose with next INR in 2 weeks       Summary  As of 5/18/2023    Full warfarin instructions:  7.5 mg every day   Next INR check:  6/1/2023             Telephone call with [facility] home care nurse who verbalizes understanding and agrees to plan and who agrees to plan and repeated back plan correctly    Orders given to  Homecare nurse/facility to recheck    Education provided:     None required    Plan made per [facility] anticoagulation protocol    Mariah Sellers RN  Anticoagulation Clinic  5/18/2023    _______________________________________________________________________     Anticoagulation Episode Summary     Current INR goal:  2.0-3.0   TTR:  59.9 % (11.3 mo)   Target end date:  Indefinite   Send INR reminders to:  [facility]    Indications    Pulmonary emboli (H) [I26.99]  Long term current use of anticoagulant therapy [Z79.01]           Comments:           Anticoagulation Care Providers     Provider Role Specialty Phone number    Anjel Busby MD Referring Family Medicine [phone]

## 2023-05-19 ENCOUNTER — DOCUMENTATION ONLY (OUTPATIENT)
Dept: FAMILY MEDICINE | Facility: CLINIC | Age: 63
End: 2023-05-19
Payer: COMMERCIAL

## 2023-05-19 NOTE — PROGRESS NOTES
Type of Form Received: Type of Form Received:     Form Received (Date) 5/19/23   Form Filled out Yes 5/24/23   Placed in provider folder Yes

## 2023-05-23 ENCOUNTER — MEDICAL CORRESPONDENCE (OUTPATIENT)
Dept: HEALTH INFORMATION MANAGEMENT | Facility: CLINIC | Age: 63
End: 2023-05-23
Payer: COMMERCIAL

## 2023-05-24 ENCOUNTER — LAB (OUTPATIENT)
Dept: LAB | Facility: CLINIC | Age: 63
End: 2023-05-24
Payer: COMMERCIAL

## 2023-05-24 ENCOUNTER — OFFICE VISIT (OUTPATIENT)
Dept: FAMILY MEDICINE | Facility: CLINIC | Age: 63
End: 2023-05-24
Payer: COMMERCIAL

## 2023-05-24 VITALS
DIASTOLIC BLOOD PRESSURE: 65 MMHG | SYSTOLIC BLOOD PRESSURE: 98 MMHG | WEIGHT: 265.7 LBS | BODY MASS INDEX: 42.7 KG/M2 | OXYGEN SATURATION: 95 % | HEART RATE: 80 BPM | HEIGHT: 66 IN

## 2023-05-24 DIAGNOSIS — M06.9 RHEUMATOID ARTHRITIS, INVOLVING UNSPECIFIED SITE, UNSPECIFIED WHETHER RHEUMATOID FACTOR PRESENT (H): Primary | ICD-10-CM

## 2023-05-24 DIAGNOSIS — M25.511 BILATERAL SHOULDER PAIN, UNSPECIFIED CHRONICITY: ICD-10-CM

## 2023-05-24 DIAGNOSIS — Z79.899 OTHER LONG TERM (CURRENT) DRUG THERAPY: ICD-10-CM

## 2023-05-24 DIAGNOSIS — L65.9 HAIR LOSS: ICD-10-CM

## 2023-05-24 DIAGNOSIS — M25.512 BILATERAL SHOULDER PAIN, UNSPECIFIED CHRONICITY: ICD-10-CM

## 2023-05-24 LAB
IRON BINDING CAPACITY (ROCHE): 195 UG/DL (ref 240–430)
IRON SATN MFR SERPL: 16 % (ref 15–46)
IRON SERPL-MCNC: 31 UG/DL (ref 37–145)

## 2023-05-24 PROCEDURE — 83540 ASSAY OF IRON: CPT | Performed by: PATHOLOGY

## 2023-05-24 PROCEDURE — 0124A COVID-19 BIVALENT 12+ (PFIZER): CPT | Performed by: FAMILY MEDICINE

## 2023-05-24 PROCEDURE — 83550 IRON BINDING TEST: CPT | Performed by: PATHOLOGY

## 2023-05-24 PROCEDURE — 99214 OFFICE O/P EST MOD 30 MIN: CPT | Mod: 25 | Performed by: FAMILY MEDICINE

## 2023-05-24 PROCEDURE — 36415 COLL VENOUS BLD VENIPUNCTURE: CPT | Performed by: PATHOLOGY

## 2023-05-24 PROCEDURE — 82306 VITAMIN D 25 HYDROXY: CPT | Performed by: PATHOLOGY

## 2023-05-24 PROCEDURE — 91312 COVID-19 BIVALENT 12+ (PFIZER): CPT | Performed by: FAMILY MEDICINE

## 2023-05-24 RX ORDER — ARIPIPRAZOLE 5 MG/1
1 TABLET ORAL
COMMUNITY
Start: 2023-05-06

## 2023-05-24 NOTE — NURSING NOTE
"Shira Rodriguez is a 63 year old female patient that presents today in clinic for the following:    Chief Complaint   Patient presents with     Follow Up     Pt here for 2 month follow up and would like to discuss hair loss.     The patient's allergies and medications were reviewed as noted. A set of vitals were recorded as noted without incident: BP 98/65 (BP Location: Right arm, Patient Position: Sitting, Cuff Size: Adult Regular)   Pulse 80   Ht 1.676 m (5' 5.98\")   Wt 120.5 kg (265 lb 11.2 oz)   SpO2 95%   BMI 42.91 kg/m  . The patient does not have any other questions for the provider.    Cherry Bryan, EMT at 3:19 PM on 5/24/2023  "

## 2023-05-25 ENCOUNTER — TELEPHONE (OUTPATIENT)
Dept: DERMATOLOGY | Facility: CLINIC | Age: 63
End: 2023-05-25

## 2023-05-25 LAB — DEPRECATED CALCIDIOL+CALCIFEROL SERPL-MC: 51 UG/L (ref 20–75)

## 2023-05-25 NOTE — TELEPHONE ENCOUNTER
Dear Dr. Busby and care team,      We have received an urgent referral for Shira to be seen by our Dermatology provider at [facility] regarding their diagnosis of;  Hair loss     The patient's chart has been reviewed and we will be offering them our next available appointment, at all of our locations.  Please note, that we are currently scheduling out into November.     Thank you for your referral and time.    [facility] Dermat[facility]

## 2023-05-25 NOTE — TELEPHONE ENCOUNTER
This encounter is being sent to inform the clinic that this patient has a referral from Dr Anjel Busby for the diagnoses of Rapid scalp hair loss normal thyroid and iron and has requested that this patient be seen within 1-2 weeks.  Based on the availability of our provider(s), we are unable to accommodate this request.      Were all sites offered this patient?  Yes      Does scheduling algorithm request to schedule next available?  Patient has been scheduled for the first available opening with Dr Teofilo Armenta on 11/16.  We have informed the patient that the clinic will review their referral and reach out if a sooner appointment is medically necessary.

## 2023-05-30 ENCOUNTER — MYC MEDICAL ADVICE (OUTPATIENT)
Dept: FAMILY MEDICINE | Facility: CLINIC | Age: 63
End: 2023-05-30
Payer: COMMERCIAL

## 2023-05-30 DIAGNOSIS — M62.81 GENERALIZED MUSCLE WEAKNESS: ICD-10-CM

## 2023-05-30 DIAGNOSIS — M06.9 RHEUMATOID ARTHRITIS, INVOLVING UNSPECIFIED SITE, UNSPECIFIED WHETHER RHEUMATOID FACTOR PRESENT (H): Primary | ICD-10-CM

## 2023-05-30 DIAGNOSIS — M25.512 BILATERAL SHOULDER PAIN, UNSPECIFIED CHRONICITY: ICD-10-CM

## 2023-05-30 DIAGNOSIS — M25.511 BILATERAL SHOULDER PAIN, UNSPECIFIED CHRONICITY: ICD-10-CM

## 2023-06-01 ENCOUNTER — MEDICAL CORRESPONDENCE (OUTPATIENT)
Dept: HEALTH INFORMATION MANAGEMENT | Facility: CLINIC | Age: 63
End: 2023-06-01

## 2023-06-01 ENCOUNTER — ANTICOAGULATION THERAPY VISIT (OUTPATIENT)
Dept: ANTICOAGULATION | Facility: CLINIC | Age: 63
End: 2023-06-01

## 2023-06-01 DIAGNOSIS — Z79.01 LONG TERM CURRENT USE OF ANTICOAGULANT THERAPY: ICD-10-CM

## 2023-06-01 DIAGNOSIS — I26.99 PULMONARY EMBOLI (H): Primary | ICD-10-CM

## 2023-06-01 LAB — INR (EXTERNAL): 3.6 (ref 0.9–1.1)

## 2023-06-01 NOTE — PROGRESS NOTES
ANTICOAGULATION MANAGEMENT     Shira Rodriguez 63 year old female is on warfarin with supratherapeutic INR result. (Goal INR 2.0-3.0)    Recent labs: (last 7 days)     06/01/23  0000   INR 3.6*       ASSESSMENT       Source(s): Chart Review and Home Care/Facility Nurse       Warfarin doses taken: Warfarin taken as instructed    Diet: No new diet changes identified    Medication/supplement changes: None noted    New illness, injury, or hospitalization: No    Signs or symptoms of bleeding or clotting: No    Previous result: Therapeutic last 2(+) visits    Additional findings: None         PLAN     Recommended plan for no diet, medication or health factor changes affecting INR     Dosing Instructions: partial hold then decrease your warfarin dose (4.8% change) with next INR in 6 days       Summary  As of 6/1/2023    Full warfarin instructions:  6/1: 2.5 mg; Otherwise 5 mg every Thu; 7.5 mg all other days   Next INR check:  6/7/2023             Telephone call with [facility] home care nurse who agrees to plan and repeated back plan correctly    Orders given to  Homecare nurse/facility to recheck    Education provided:     Symptom monitoring: monitoring for bleeding signs and symptoms, when to seek medical attention/emergency care and if you hit your head or have a bad fall seek emergency care    Plan made per [facility] anticoagulation protocol    Tea Deng, RN  Anticoagulation Clinic  6/1/2023    _______________________________________________________________________     Anticoagulation Episode Summary     Current INR goal:  2.0-3.0   TTR:  61.4 % (11.3 mo)   Target end date:  Indefinite   Send INR reminders to:  [facility]    Indications    Pulmonary emboli (H) [I26.99]  Long term current use of anticoagulant therapy [Z79.01]           Comments:           Anticoagulation Care Providers     Provider Role Specialty Phone number    Anjel Busby MD Referring Family Medicine [phone]

## 2023-06-05 ENCOUNTER — DOCUMENTATION ONLY (OUTPATIENT)
Dept: FAMILY MEDICINE | Facility: CLINIC | Age: 63
End: 2023-06-05
Payer: COMMERCIAL

## 2023-06-05 NOTE — PROGRESS NOTES
Type of Form Received:     Form Received (Date) 6/5/23   Form Filled out Yes 6/7/23   Placed in provider folder Yes

## 2023-06-07 ENCOUNTER — TELEPHONE (OUTPATIENT)
Dept: ANTICOAGULATION | Facility: CLINIC | Age: 63
End: 2023-06-07
Payer: COMMERCIAL

## 2023-06-07 ENCOUNTER — MEDICAL CORRESPONDENCE (OUTPATIENT)
Dept: HEALTH INFORMATION MANAGEMENT | Facility: CLINIC | Age: 63
End: 2023-06-07
Payer: COMMERCIAL

## 2023-06-07 NOTE — TELEPHONE ENCOUNTER
ROSA Chavarria from [facility].  They were scheduled to see Shira today to check her INR.  Home care talked with Shira who is on her way out of town to [facility]. She will not be back until 6/11/23.  Home care advised her to go to her clinic for an INR, but Shira declined.  Home Care will try to see her on 6/12/23 for an INR.  Tea Deng RN

## 2023-06-08 ENCOUNTER — DOCUMENTATION ONLY (OUTPATIENT)
Dept: FAMILY MEDICINE | Facility: CLINIC | Age: 63
End: 2023-06-08
Payer: COMMERCIAL

## 2023-06-08 NOTE — PROGRESS NOTES
Type of Form Received:     Form Received (Date) 6/8/23   Form Filled out No   Placed in provider folder Yes

## 2023-06-12 ENCOUNTER — ANTICOAGULATION THERAPY VISIT (OUTPATIENT)
Dept: ANTICOAGULATION | Facility: CLINIC | Age: 63
End: 2023-06-12
Payer: COMMERCIAL

## 2023-06-12 ENCOUNTER — MEDICAL CORRESPONDENCE (OUTPATIENT)
Dept: HEALTH INFORMATION MANAGEMENT | Facility: CLINIC | Age: 63
End: 2023-06-12
Payer: COMMERCIAL

## 2023-06-12 DIAGNOSIS — Z79.01 LONG TERM CURRENT USE OF ANTICOAGULANT THERAPY: ICD-10-CM

## 2023-06-12 DIAGNOSIS — I26.99 PULMONARY EMBOLI (H): Primary | ICD-10-CM

## 2023-06-12 LAB — INR (EXTERNAL): 2.6 (ref 0.9–1.1)

## 2023-06-12 NOTE — PROGRESS NOTES
ANTICOAGULATION MANAGEMENT     Shira Rodriguez 63 year old female is on warfarin with therapeutic INR result. (Goal INR 2.0-3.0)    Recent labs: (last 7 days)     06/12/23  0000   INR 2.6*       ASSESSMENT       Source(s): Chart Review and Patient/Caregiver Call       Warfarin doses taken: Warfarin taken as instructed    Diet: No new diet changes identified    Medication/supplement changes: None noted    New illness, injury, or hospitalization: No    Signs or symptoms of bleeding or clotting: No    Previous result: Supratherapeutic    Additional findings: None         PLAN     Recommended plan for no diet, medication or health factor changes affecting INR     Dosing Instructions: Continue your current warfarin dose with next INR in 10 days       Summary  As of 6/12/2023    Full warfarin instructions:  5 mg every Thu; 7.5 mg all other days   Next INR check:  6/21/2023             Telephone call with [facility] home care nurse who verbalizes understanding and agrees to plan and who agrees to plan and repeated back plan correctly    Orders given to  Homecare nurse/facility to recheck    Education provided:     None required    Plan made per [facility] anticoagulation protocol    Mariah Sellers RN  Anticoagulation Clinic  6/12/2023    _______________________________________________________________________     Anticoagulation Episode Summary     Current INR goal:  2.0-3.0   TTR:  59.5 % (11.3 mo)   Target end date:  Indefinite   Send INR reminders to:  [facility]    Indications    Pulmonary emboli (H) [I26.99]  Long term current use of anticoagulant therapy [Z79.01]           Comments:           Anticoagulation Care Providers     Provider Role Specialty Phone number    Anjel Busby MD Referring Family Medicine [phone]

## 2023-06-13 ENCOUNTER — DOCUMENTATION ONLY (OUTPATIENT)
Dept: FAMILY MEDICINE | Facility: CLINIC | Age: 63
End: 2023-06-13
Payer: COMMERCIAL

## 2023-06-21 ENCOUNTER — ANTICOAGULATION THERAPY VISIT (OUTPATIENT)
Dept: ANTICOAGULATION | Facility: CLINIC | Age: 63
End: 2023-06-21
Payer: COMMERCIAL

## 2023-06-21 DIAGNOSIS — I26.99 PULMONARY EMBOLI (H): Primary | ICD-10-CM

## 2023-06-21 DIAGNOSIS — Z79.01 LONG TERM CURRENT USE OF ANTICOAGULANT THERAPY: ICD-10-CM

## 2023-06-21 LAB — INR (EXTERNAL): 2.4 (ref 0.9–1.1)

## 2023-06-21 NOTE — PROGRESS NOTES
ANTICOAGULATION MANAGEMENT     Shira Rodriguez 63 year old female is on warfarin with therapeutic INR result. (Goal INR 2.0-3.0)    Recent labs: (last 7 days)     06/21/23  0000   INR 2.4*       ASSESSMENT       Source(s): Chart Review and Home Care/Facility Nurse       Warfarin doses taken: Warfarin taken as instructed    Diet: No new diet changes identified    Medication/supplement changes: None noted    New illness, injury, or hospitalization: No    Signs or symptoms of bleeding or clotting: No    Previous result: Therapeutic last visit; previously outside of goal range    Additional findings: None         PLAN     Recommended plan for no diet, medication or health factor changes affecting INR     Dosing Instructions: Continue your current warfarin dose with next INR in 2 weeks.  Shira is going to be out of town the week of July 3rd.  Home care to see her next 7/10/23 and will check INR.       Summary  As of 6/21/2023    Full warfarin instructions:  5 mg every Thu; 7.5 mg all other days   Next INR check:  7/10/2023             Telephone call with [facility] home care nurse who agrees to plan and repeated back plan correctly    Orders given to  Homecare nurse/facility to recheck    Education provided:     Please call back if any changes to your diet, medications or how you've been taking warfarin    Contact [phone] with any changes, questions or concerns.     Plan made per [facility] anticoagulation protocol    Tea Deng RN  Anticoagulation Clinic  6/21/2023    _______________________________________________________________________     Anticoagulation Episode Summary     Current INR goal:  2.0-3.0   TTR:  59.5 % (11.3 mo)   Target end date:  Indefinite   Send INR reminders [facility]    Indications    Pulmonary emboli (H) [I26.99]  Long term current use of anticoagulant therapy [Z79.01]           Comments:           Anticoagulation Care Providers     Provider Role Specialty Phone number     Anjel Busby MD [facility] Medicine [phone]

## 2023-06-22 ENCOUNTER — ANCILLARY PROCEDURE (OUTPATIENT)
Dept: GENERAL RADIOLOGY | Facility: CLINIC | Age: 63
End: 2023-06-22
Attending: FAMILY MEDICINE
Payer: COMMERCIAL

## 2023-06-22 ENCOUNTER — DOCUMENTATION ONLY (OUTPATIENT)
Dept: FAMILY MEDICINE | Facility: CLINIC | Age: 63
End: 2023-06-22

## 2023-06-22 ENCOUNTER — OFFICE VISIT (OUTPATIENT)
Dept: ORTHOPEDICS | Facility: CLINIC | Age: 63
End: 2023-06-22
Attending: FAMILY MEDICINE
Payer: COMMERCIAL

## 2023-06-22 VITALS — WEIGHT: 256 LBS | HEIGHT: 66 IN | BODY MASS INDEX: 41.14 KG/M2

## 2023-06-22 DIAGNOSIS — M25.512 LEFT SHOULDER PAIN: ICD-10-CM

## 2023-06-22 DIAGNOSIS — M19.011 PRIMARY OSTEOARTHRITIS, RIGHT SHOULDER: ICD-10-CM

## 2023-06-22 DIAGNOSIS — M25.512 CHRONIC LEFT SHOULDER PAIN: ICD-10-CM

## 2023-06-22 DIAGNOSIS — M19.012 GLENOHUMERAL ARTHRITIS, LEFT: Primary | ICD-10-CM

## 2023-06-22 DIAGNOSIS — G89.29 CHRONIC LEFT SHOULDER PAIN: ICD-10-CM

## 2023-06-22 PROCEDURE — 99214 OFFICE O/P EST MOD 30 MIN: CPT | Mod: 25 | Performed by: FAMILY MEDICINE

## 2023-06-22 PROCEDURE — 20611 DRAIN/INJ JOINT/BURSA W/US: CPT | Mod: LT | Performed by: FAMILY MEDICINE

## 2023-06-22 PROCEDURE — 73030 X-RAY EXAM OF SHOULDER: CPT | Mod: TC | Performed by: RADIOLOGY

## 2023-06-22 RX ADMIN — LIDOCAINE HYDROCHLORIDE 3 ML: 10 INJECTION, SOLUTION INFILTRATION; PERINEURAL at 17:38

## 2023-06-22 RX ADMIN — LIDOCAINE HYDROCHLORIDE 4 ML: 10 INJECTION, SOLUTION INFILTRATION; PERINEURAL at 17:38

## 2023-06-22 RX ADMIN — METHYLPREDNISOLONE ACETATE 40 MG: 40 INJECTION, SUSPENSION INTRA-ARTICULAR; INTRALESIONAL; INTRAMUSCULAR; SOFT TISSUE at 17:38

## 2023-06-22 NOTE — PROGRESS NOTES
Type of Form Received:     Form Received (Date) 6/22/23   Form Filled out Yes, 06/28/23   Placed in provider folder Yes

## 2023-06-22 NOTE — PATIENT INSTRUCTIONS
Thank you for choosing [facility]    DR CAMPUZANO'S CLINIC LOCATIONS  [place] [address] [address] [facility], [address] [place]   [place], [place], [address] [place] [address]   [phone]       APPOINTMENTS: [phone]    CARE QUESTIONS: [phone],    BILLING QUESTIONS: [phone]    FAX NUMBER: [phone]        Follow up: as needed      1. Left shoulder pain    2. Bilateral shoulder pain, unspecified chronicity          Steroid Injection Information:    A corticosteroid injection was administered at your visit today.  The area of injection may be sore, slightly swollen for 1-2 days afterward.  Immediately after injection, you may have an area of numbness, which is caused by the local anesthetic, lidocaine (similar to novacaine) in the shot.  This medicine will wear off in about 4 hours.  In addition to the lidocaine, a steroid medication was injected, called triamcinolone acetate.  This medication is intended to provide long-acting antiinflammatory / pain relief.  It may take 2-5 days for this medication to provide noticeable relief.      After an injection, Dr. Joy recommends:    Protecting the area wearing a bandage or gauze pad for at least 24 hours.    Using ice; ice bag or frozen vegetables over the injection site every one to two hours when able (for 15 minutes at a time).      Avoid any strenuous activity even if the knee is already feeling better immediately afterward. Light stretching is encouraged but refrain from home exercise program and increasing activity level for about 48 hours.    Avoid soaking in a hot tub, bath, or pool for 48 hours after injection. Showering is fine!    Watch for signs of infection, including increasing pain, redness and swelling that last more than 48 hours after injection.

## 2023-06-22 NOTE — PROGRESS NOTES
CHIEF COMPLAINT:  Pain of the Left Shoulder and Pain of the Right Shoulder       HISTORY OF PRESENT ILLNESS  Ms. Rodriguez is a pleasant 63 year old right hand dominant female who presents to clinic today with bilateral shoulder pain.  Shira explains that her pain began a few months ago with no known injury or trauma and has been worsening ever since.     Onset: gradual, worsening  Location: bilateral shoulder, left > right  Quality:  aching and sharp  Duration: 2 months   Severity: 7/10 at worst  Timing:intermittent episodes position-dependent.   Modifying factors:  resting and non-use makes it better, movement and use makes it worse.   Associated signs & symptoms: pain, decreased ROM   Treatments to date: sitting still, activity avoidance. Tylenol- doesn't help. She has had a right shoulder corticosteroid in the past w Dr Duque for DJD. No treatments or imaging prior for left.     Additional history: as documented      Review of Systems: A 10-point review of systems was obtained and is negative except for as noted in the HPI.         MEDICAL HISTORY  Patient Active Problem List   Diagnosis     Primary localized osteoarthrosis, pelvic region and thigh     Adjustment disorder with depressed mood     Tobacco use disorder     Morbid obesity (H)     Arthritis of knee, degenerative     Degeneration of cervical intervertebral disc     CYRUS (obstructive sleep apnea)     Elevated blood pressure (not hypertension)     Pulmonary emboli (H)     Rheumatoid arthritis (H)     Cardiac arrest (H)     Long term current use of anticoagulant therapy     Bacterial sepsis (H)     Neutropenic fever (H)     Loss of hair     Seborrheic dermatitis       Current Outpatient Medications   Medication Sig Dispense Refill     acetaminophen (TYLENOL) 325 MG tablet Take 2 tablets (650 mg) by mouth every 6 hours as needed for mild pain or fever       albuterol (PROAIR HFA/PROVENTIL HFA/VENTOLIN HFA) 108 (90 Base) MCG/ACT inhaler Inhale 2 puffs into  "the lungs every 6 hours as needed for shortness of breath / dyspnea or wheezing (Patient not taking: Reported on 5/24/2023) 18 g 5     ARIPiprazole (ABILIFY) 5 MG tablet Take 1 tablet by mouth daily at 2 pm       atorvastatin (LIPITOR) 40 MG tablet Take 1 tablet (40 mg) by mouth every evening (Patient not taking: Reported on 5/24/2023) 30 tablet 0     cyanocobalamin (VITAMIN B-12) 500 MCG SUBL sublingual tablet Place 2 tablets (1,000 mcg) under the tongue daily 180 tablet 1     DULoxetine (CYMBALTA) 60 MG capsule Take 1 capsule (60 mg) by mouth daily (Patient taking differently: Take 120 mg by mouth daily) 30 capsule 0     ferrous sulfate (FEROSUL) 325 (65 Fe) MG tablet Take 1 tablet (325 mg) by mouth daily . Take with citrus. 30 tablet 3     folic acid (FOLVITE) 1 MG tablet Take 2 tablets (2 mg) by mouth daily 180 tablet 1     gabapentin (NEURONTIN) 100 MG capsule Take 1 capsule (100 mg) by mouth 3 times daily (Patient not taking: Reported on 5/24/2023) 90 capsule 0     insulin syringe-needle U-100 (BD INSULIN SYRINGE ULTRAFINE) 30G X 1/2\" 1 ML miscellaneous For Methotrexate use weekly. (Patient not taking: Reported on 2/23/2023) 12 each 3     ketoconazole (NIZORAL) 2 % external shampoo Apply topically daily as needed for itching or irritation 120 mL 2     lisinopril (ZESTRIL) 5 MG tablet Take 1 tablet (5 mg) by mouth daily 90 tablet 3     melatonin 3 MG tablet Take 1 tablet (3 mg) by mouth nightly as needed for sleep       miconazole (MICATIN) 2 % external cream Apply topically 2 times daily (Patient not taking: Reported on 5/24/2023)       multivitamin w/minerals (THERA-VIT-M) tablet Take 1 tablet by mouth daily 100 tablet 1     nystatin (MYCOSTATIN) 342479 UNIT/GM external powder Apply topically 4 times daily as needed 60 g 1     pantoprazole (PROTONIX) 40 MG EC tablet Take 1 tablet (40 mg) by mouth 2 times daily (before meals) (Patient not taking: Reported on 5/24/2023)       senna-docusate " "(SENOKOT-S/PERICOLACE) 8.6-50 MG tablet Take 1 tablet by mouth 2 times daily as needed for constipation 180 tablet 1     VITAMIN D3 25 MCG (1000 UT) tablet TAKE 2 TABLETS BY MOUTH EVERY DAY       warfarin ANTICOAGULANT (COUMADIN) 5 MG tablet Take 1 tab Tue, Thur, Sat; 1.5 tabs all other days. Adjust dose as directed by INR clinic. 116 tablet 1     Warfarin Therapy Reminder Take 1 each by mouth See Admin Instructions         Allergies   Allergen Reactions     Adhesive Tape Blisters     Erythromycin Rash       Family History   Problem Relation Age of Onset     Thyroid Disease Mother      Hypertension Mother      Skin Cancer Mother         MMohs surgery with skin graft     Cerebrovascular Disease Mother         CVA after endarderectomy-      Diabetes Mother      Depression Mother      Alcoholism Father      Heart Disease Father      Substance Abuse Father              Heart Disease Sister         multiple ablations     Alcoholism Sister      Substance Abuse Sister         Sober     Depression Sister      Substance Abuse Sister         Sober 30 plus years     Depression Sister      Thyroid Disease Sister      Hypertension Maternal Grandmother      Depression Maternal Grandmother      Breast Cancer Paternal Grandmother      Pancreatic Cancer Paternal Grandmother      Prostate Cancer Paternal Grandfather      Cardiovascular Paternal Aunt      Cardiovascular Paternal Uncle      Depression Cousin      Depression Other        Additional medical/Social/Surgical histories reviewed in Psychiatric and updated as appropriate.       PHYSICAL EXAM  Ht 1.676 m (5' 6\")   Wt 116.1 kg (256 lb)   BMI 41.32 kg/m      General  - normal appearance, in no obvious distress  Musculoskeletal - Bilateral shoulder  - inspection: normal bone and joint alignment, no obvious deformity, no scapular winging, no AC step-off,   - palpation: Tenderness anterior joint line, normal clavicle, non-tender AC  - ROM:  Shoulder hiking with " abduction, painful and limited forward elevation, abduction and internal rotation  - strength: 5/5  strength, 4/5 shoulder strength in abduction due to pain.  5/5 ER and IR  - special tests:  (-) Speed's  (+) Neer  (+) Hawkin's  (+) Davey's  (-) Laurel's  (-) apprehension  (-) subscap lift-off  Neuro  - no sensory or motor deficit, grossly normal coordination, normal muscle tone  Skin  - no ecchymosis, erythema, warmth, or induration, no obvious rash  Psych  - interactive, appropriate, normal mood and affect       IMAGING : XR left shoulder 4 views. Final results and radiologist's interpretation, available in the [facility] health record. Images were reviewed with the patient/family members in the office today. My personal interpretation of the performed imaging is mild osteoarthritis of left shoulder.      XR shoulder right with severe osteoarthritis of right shoulder from 11/15/22.     ASSESSMENT & PLAN  Ms. Rodriguez is a 63 year old year old female who presents to clinic today with acute on chronic left and right shoulder pain.      Diagnosis:   1. Pain of left shoulder  2. Osteoarthritis of left shoulder  3. Primary osteoarthritis of right shoulder    Discussed treatments for left shoulder only today due to time constraints. We agreed to proceed with US guided CSI to left shoulder glenohumeral joint.  See PRC note below.  If left shoulder pain persists, may try subacromial bursal injeciton.    Right shoulder treatment reviewed in past and includes US guided injections, PT, or arthroplasty.  She wishes to try US guided injection again and follow-up was scheduled to complete this in the next 2 weeks.    Glenohumeral Injection - Ultrasound Guided  The patient was informed of the risks and the benefits of the procedure and a written consent was signed.  The patient s left shoulder was prepped with chlorhexidine in sterile fashion.   Local anesthesia was performed using a 27-gauge 1.5-inch needle to administer 3 mL of  1% lidocaine without epi.  40 mg of methylprednisolone suspension was drawn up into a 5 mL syringe with 3 mL of 1% lidocaine w/o Epi.  Injection was performed using sterile technique.  Under ultrasound guidance a 3.5-inch 22-gauge needle was used to enter the left  glenohumeral joint.  Posterior approach was used with the patient in seated position, arm in neutral position at the side.  Needle placement was visualized and documented with ultrasound.  Ultrasound visualization was necessary due to the small joint space entered.  Injection performed long axis to the probe.  Injection solution visualized within the joint space.  Images were permanently stored for the patient's record.  There were no complications. The patient tolerated the procedure well. There was negligible bleeding.   Therapy scheduled to follow for mobilization.  The patient was instructed to call or go to the emergency room with any unusual pain, swelling, redness, or if otherwise concerned.  Tashi Joy DO [facility]  Primary Care Sports Medicine  [facility]        Large Joint Injection/Arthocentesis: L glenohumeral joint    Date/Time: 6/22/2023 5:38 PM    Performed by: Tashi Joy DO  Authorized by: Tashi Joy DO    Indications:  Pain  Needle Size:  22 G  Guidance: ultrasound    Location:  Shoulder      Site:  L glenohumeral joint  Medications:  40 mg methylPREDNISolone 40 MG/ML; 3 mL lidocaine 1 %; 4 mL lidocaine 1 %  Medications comment:  The 3mL was used as local anesthetic.  Outcome:  Tolerated well, no immediate complications  Procedure discussed: discussed risks, benefits, and alternatives    Consent Given by:  Patient  Timeout: timeout called immediately prior to procedure    Prep: patient was prepped and draped in usual sterile fashion     Ultrasound was used to ensure safe and accurate needle placement and injection. Ultrasound images of the procedure were permanently stored.          It was a pleasure  seeing Shira today.    Tashi Joy DO, CAQSM  Primary Care Sports Medicine

## 2023-06-22 NOTE — LETTER
6/22/2023         RE: Shira Rodriguez  [address]  [place] [address]        Dear Colleague,    Thank you for referring your patient, Shira Rodriguez, to the [facility] [place]. Please see a copy of my visit note below.    CHIEF COMPLAINT:  Pain of the Left Shoulder and Pain of the Right Shoulder       HISTORY OF PRESENT ILLNESS  Ms. Rodriguez is a pleasant 63 year old right hand dominant female who presents to clinic today with bilateral shoulder pain.  Shira explains that her pain began a few months ago with no known injury or trauma and has been worsening ever since.     Onset: gradual, worsening  Location: bilateral shoulder, left > right  Quality:  aching and sharp  Duration: 2 months   Severity: 7/10 at worst  Timing:intermittent episodes position-dependent.   Modifying factors:  resting and non-use makes it better, movement and use makes it worse.   Associated signs & symptoms: pain, decreased ROM   Treatments to date: sitting still, activity avoidance. Tylenol- doesn't help. She has had a right shoulder corticosteroid in the past w Dr Duque for DJD. No treatments or imaging prior for left.     Additional history: as documented      Review of Systems: A 10-point review of systems was obtained and is negative except for as noted in the HPI.         MEDICAL HISTORY  Patient Active Problem List   Diagnosis     Primary localized osteoarthrosis, pelvic region and thigh     Adjustment disorder with depressed mood     Tobacco use disorder     Morbid obesity (H)     Arthritis of knee, degenerative     Degeneration of cervical intervertebral disc     CYRUS (obstructive sleep apnea)     Elevated blood pressure (not hypertension)     Pulmonary emboli (H)     Rheumatoid arthritis (H)     Cardiac arrest (H)     Long term current use of anticoagulant therapy     Bacterial sepsis (H)     Neutropenic fever (H)     Loss of hair     Seborrheic dermatitis       Current  "Outpatient Medications   Medication Sig Dispense Refill     acetaminophen (TYLENOL) 325 MG tablet Take 2 tablets (650 mg) by mouth every 6 hours as needed for mild pain or fever       albuterol (PROAIR HFA/PROVENTIL HFA/VENTOLIN HFA) 108 (90 Base) MCG/ACT inhaler Inhale 2 puffs into the lungs every 6 hours as needed for shortness of breath / dyspnea or wheezing (Patient not taking: Reported on 5/24/2023) 18 g 5     ARIPiprazole (ABILIFY) 5 MG tablet Take 1 tablet by mouth daily at 2 pm       atorvastatin (LIPITOR) 40 MG tablet Take 1 tablet (40 mg) by mouth every evening (Patient not taking: Reported on 5/24/2023) 30 tablet 0     cyanocobalamin (VITAMIN B-12) 500 MCG SUBL sublingual tablet Place 2 tablets (1,000 mcg) under the tongue daily 180 tablet 1     DULoxetine (CYMBALTA) 60 MG capsule Take 1 capsule (60 mg) by mouth daily (Patient taking differently: Take 120 mg by mouth daily) 30 capsule 0     ferrous sulfate (FEROSUL) 325 (65 Fe) MG tablet Take 1 tablet (325 mg) by mouth daily . Take with citrus. 30 tablet 3     folic acid (FOLVITE) 1 MG tablet Take 2 tablets (2 mg) by mouth daily 180 tablet 1     gabapentin (NEURONTIN) 100 MG capsule Take 1 capsule (100 mg) by mouth 3 times daily (Patient not taking: Reported on 5/24/2023) 90 capsule 0     insulin syringe-needle U-100 (BD INSULIN SYRINGE ULTRAFINE) 30G X 1/2\" 1 ML miscellaneous For Methotrexate use weekly. (Patient not taking: Reported on 2/23/2023) 12 each 3     ketoconazole (NIZORAL) 2 % external shampoo Apply topically daily as needed for itching or irritation 120 mL 2     lisinopril (ZESTRIL) 5 MG tablet Take 1 tablet (5 mg) by mouth daily 90 tablet 3     melatonin 3 MG tablet Take 1 tablet (3 mg) by mouth nightly as needed for sleep       miconazole (MICATIN) 2 % external cream Apply topically 2 times daily (Patient not taking: Reported on 5/24/2023)       multivitamin w/minerals (THERA-VIT-M) tablet Take 1 tablet by mouth daily 100 tablet 1     " "nystatin (MYCOSTATIN) 370598 UNIT/GM external powder Apply topically 4 times daily as needed 60 g 1     pantoprazole (PROTONIX) 40 MG EC tablet Take 1 tablet (40 mg) by mouth 2 times daily (before meals) (Patient not taking: Reported on 2023)       senna-docusate (SENOKOT-S/PERICOLACE) 8.6-50 MG tablet Take 1 tablet by mouth 2 times daily as needed for constipation 180 tablet 1     VITAMIN D3 25 MCG (1000 UT) tablet TAKE 2 TABLETS BY MOUTH EVERY DAY       warfarin ANTICOAGULANT (COUMADIN) 5 MG tablet Take 1 tab Tue, Thur, Sat; 1.5 tabs all other days. Adjust dose as directed by INR clinic. 116 tablet 1     Warfarin Therapy Reminder Take 1 each by mouth See Admin Instructions         Allergies   Allergen Reactions     Adhesive Tape Blisters     Erythromycin Rash       Family History   Problem Relation Age of Onset     Thyroid Disease Mother      Hypertension Mother      Skin Cancer Mother         MMohs surgery with skin graft     Cerebrovascular Disease Mother         CVA after endarderectomy-      Diabetes Mother      Depression Mother      Alcoholism Father      Heart Disease Father      Substance Abuse Father              Heart Disease Sister         multiple ablations     Alcoholism Sister      Substance Abuse Sister         Sober     Depression Sister      Substance Abuse Sister         Sober 30 plus years     Depression Sister      Thyroid Disease Sister      Hypertension Maternal Grandmother      Depression Maternal Grandmother      Breast Cancer Paternal Grandmother      Pancreatic Cancer Paternal Grandmother      Prostate Cancer Paternal Grandfather      Cardiovascular Paternal Aunt      Cardiovascular Paternal Uncle      Depression Cousin      Depression Other        Additional medical/Social/Surgical histories reviewed in EPIC and updated as appropriate.       PHYSICAL EXAM  Ht 1.676 m (5' 6\")   Wt 116.1 kg (256 lb)   BMI 41.32 kg/m      General  - normal appearance, in no " obvious distress  Musculoskeletal - Bilateral shoulder  - inspection: normal bone and joint alignment, no obvious deformity, no scapular winging, no AC step-off,   - palpation: Tenderness anterior joint line, normal clavicle, non-tender AC  - ROM:  Shoulder hiking with abduction, painful and limited forward elevation, abduction and internal rotation  - strength: 5/5  strength, 4/5 shoulder strength in abduction due to pain.  5/5 ER and IR  - special tests:  (-) Speed's  (+) Neer  (+) Hawkin's  (+) Davey's  (-) [place]'s  (-) apprehension  (-) subscap lift-off  Neuro  - no sensory or motor deficit, grossly normal coordination, normal muscle tone  Skin  - no ecchymosis, erythema, warmth, or induration, no obvious rash  Psych  - interactive, appropriate, normal mood and affect       IMAGING : XR left shoulder 4 views. Final results and radiologist's interpretation, available in the [facility] health record. Images were reviewed with the patient/family members in the office today. My personal interpretation of the performed imaging is mild osteoarthritis of left shoulder.      XR shoulder right with severe osteoarthritis of right shoulder from 11/15/22.     ASSESSMENT & PLAN  Ms. Rodriguez is a 63 year old year old female who presents to clinic today with acute on chronic left and right shoulder pain.      Diagnosis:   1. Pain of left shoulder  2. Osteoarthritis of left shoulder  3. Primary osteoarthritis of right shoulder    Discussed treatments for left shoulder only today due to time constraints. We agreed to proceed with US guided CSI to left shoulder glenohumeral joint.  See PRC note below.  If left shoulder pain persists, may try subacromial bursal injeciton.    Right shoulder treatment reviewed in past and includes US guided injections, PT, or arthroplasty.  She wishes to try US guided injection again and follow-up was scheduled to complete this in the next 2 weeks.    Glenohumeral Injection - Ultrasound Guided  The  patient was informed of the risks and the benefits of the procedure and a written consent was signed.  The patient s left shoulder was prepped with chlorhexidine in sterile fashion.   Local anesthesia was performed using a 27-gauge 1.5-inch needle to administer 3 mL of 1% lidocaine without epi.  40 mg of methylprednisolone suspension was drawn up into a 5 mL syringe with 3 mL of 1% lidocaine w/o Epi.  Injection was performed using sterile technique.  Under ultrasound guidance a 3.5-inch 22-gauge needle was used to enter the left  glenohumeral joint.  Posterior approach was used with the patient in seated position, arm in neutral position at the side.  Needle placement was visualized and documented with ultrasound.  Ultrasound visualization was necessary due to the small joint space entered.  Injection performed long axis to the probe.  Injection solution visualized within the joint space.  Images were permanently stored for the patient's record.  There were no complications. The patient tolerated the procedure well. There was negligible bleeding.   Therapy scheduled to follow for mobilization.  The patient was instructed to call or go to the emergency room with any unusual pain, swelling, redness, or if otherwise concerned.  Tashi Joy DO [facility]  Primary Care Sports Medicine  [facility]        Large Joint Injection/Arthocentesis: L glenohumeral joint    Date/Time: 6/22/2023 5:38 PM    Performed by: Tashi Joy DO  Authorized by: Tashi Joy DO    Indications:  Pain  Needle Size:  22 G  Guidance: ultrasound    Location:  Shoulder      Site:  L glenohumeral joint  Medications:  40 mg methylPREDNISolone 40 MG/ML; 3 mL lidocaine 1 %; 4 mL lidocaine 1 %  Medications comment:  The 3mL was used as local anesthetic.  Outcome:  Tolerated well, no immediate complications  Procedure discussed: discussed risks, benefits, and alternatives    Consent Given by:  Patient  Timeout: timeout called  immediately prior to procedure    Prep: patient was prepped and draped in usual sterile fashion     Ultrasound was used to ensure safe and accurate needle placement and injection. Ultrasound images of the procedure were permanently stored.          It was a pleasure seeing Shira today.    Tashi Joy DO, [facility]  Primary Care Sports Medicine      Again, thank you for allowing me to participate in the care of your patient.        Sincerely,        Tashi Joy DO

## 2023-06-26 RX ORDER — LIDOCAINE HYDROCHLORIDE 10 MG/ML
3 INJECTION, SOLUTION INFILTRATION; PERINEURAL
Status: SHIPPED | OUTPATIENT
Start: 2023-06-22

## 2023-06-26 RX ORDER — LIDOCAINE HYDROCHLORIDE 10 MG/ML
4 INJECTION, SOLUTION INFILTRATION; PERINEURAL
Status: SHIPPED | OUTPATIENT
Start: 2023-06-22

## 2023-06-26 RX ORDER — METHYLPREDNISOLONE ACETATE 40 MG/ML
40 INJECTION, SUSPENSION INTRA-ARTICULAR; INTRALESIONAL; INTRAMUSCULAR; SOFT TISSUE
Status: SHIPPED | OUTPATIENT
Start: 2023-06-22

## 2023-06-28 ENCOUNTER — MEDICAL CORRESPONDENCE (OUTPATIENT)
Dept: HEALTH INFORMATION MANAGEMENT | Facility: CLINIC | Age: 63
End: 2023-06-28
Payer: COMMERCIAL

## 2023-07-02 ENCOUNTER — HEALTH MAINTENANCE LETTER (OUTPATIENT)
Age: 63
End: 2023-07-02

## 2023-07-10 ENCOUNTER — ANTICOAGULATION THERAPY VISIT (OUTPATIENT)
Dept: ANTICOAGULATION | Facility: CLINIC | Age: 63
End: 2023-07-10
Payer: COMMERCIAL

## 2023-07-10 DIAGNOSIS — I26.99 PULMONARY EMBOLI (H): Primary | ICD-10-CM

## 2023-07-10 DIAGNOSIS — Z79.01 LONG TERM CURRENT USE OF ANTICOAGULANT THERAPY: ICD-10-CM

## 2023-07-10 LAB — INR (EXTERNAL): 2.7 (ref 0.9–1.1)

## 2023-07-10 NOTE — PROGRESS NOTES
Addendum 7/10/23 ROSA Guerin from [facility] called back reporting that she discharged patient from their services and that the patient will need to go into clinic to get the next INR. Writer called patient and left another detailed voice message letting her know that we can push out the next INR in 4 weeks and to call us if needing help with scheduling their lab appt.    Patience Martins RN

## 2023-07-10 NOTE — PROGRESS NOTES
ANTICOAGULATION MANAGEMENT     Shira Rodriguez 63 year old female is on warfarin with therapeutic INR result. (Goal INR 2.0-3.0)    Recent labs: (last 7 days)     07/10/23  0000   INR 2.7*       ASSESSMENT       Source(s): Chart Review    Previous INR was Therapeutic last 2(+) visits    Medication, diet, health changes since last INR chart reviewed; none identified             PLAN     Recommended plan for no diet, medication or health factor changes affecting INR     Dosing Instructions: Continue your current warfarin dose with next INR in 2 weeks       Summary  As of 7/10/2023    Full warfarin instructions:  5 mg every Thu; 7.5 mg all other days   Next INR check:  7/24/2023             Detailed voice message left for Shira with dosing instructions and follow up date. Also left detailed voice message for ROSA Guerin with [facility].    Orders given to  Homecare nurse/facility to recheck    Education provided:     Please call back if any changes to your diet, medications or how you've been taking warfarin    Contact [phone] with any changes, questions or concerns.     Plan made per [facility] anticoagulation protocol    Patience Martins RN  Anticoagulation Clinic  7/10/2023    _______________________________________________________________________     Anticoagulation Episode Summary     Current INR goal:  2.0-3.0   TTR:  59.5 % (11.3 mo)   Target end date:  Indefinite   Send INR reminders to:  [facility]    Indications    Pulmonary emboli (H) [I26.99]  Long term current use of anticoagulant therapy [Z79.01]           Comments:           Anticoagulation Care Providers     Provider Role Specialty Phone number    Anjel Busby MD Referring Family Medicine [phone]

## 2023-07-14 DIAGNOSIS — Z79.899 HIGH RISK MEDICATIONS (NOT ANTICOAGULANTS) LONG-TERM USE: Primary | ICD-10-CM

## 2023-07-20 ENCOUNTER — ANTICOAGULATION THERAPY VISIT (OUTPATIENT)
Dept: ANTICOAGULATION | Facility: CLINIC | Age: 63
End: 2023-07-20

## 2023-07-20 ENCOUNTER — LAB (OUTPATIENT)
Dept: LAB | Facility: CLINIC | Age: 63
End: 2023-07-20
Payer: COMMERCIAL

## 2023-07-20 DIAGNOSIS — Z79.01 LONG TERM CURRENT USE OF ANTICOAGULANT THERAPY: ICD-10-CM

## 2023-07-20 DIAGNOSIS — I26.99 PULMONARY EMBOLI (H): Primary | ICD-10-CM

## 2023-07-20 DIAGNOSIS — Z79.899 HIGH RISK MEDICATIONS (NOT ANTICOAGULANTS) LONG-TERM USE: ICD-10-CM

## 2023-07-20 LAB
HBA1C MFR BLD: 5.5 % (ref 0–5.6)
INR BLD: 1.8 (ref 0.9–1.1)

## 2023-07-20 PROCEDURE — 36415 COLL VENOUS BLD VENIPUNCTURE: CPT

## 2023-07-20 PROCEDURE — 85610 PROTHROMBIN TIME: CPT

## 2023-07-20 PROCEDURE — 83036 HEMOGLOBIN GLYCOSYLATED A1C: CPT

## 2023-07-20 PROCEDURE — 82947 ASSAY GLUCOSE BLOOD QUANT: CPT

## 2023-07-20 PROCEDURE — 36416 COLLJ CAPILLARY BLOOD SPEC: CPT

## 2023-07-20 PROCEDURE — 80061 LIPID PANEL: CPT

## 2023-07-20 NOTE — PROGRESS NOTES
ANTICOAGULATION MANAGEMENT     Shira Rodriguez 63 year old female is on warfarin with subtherapeutic INR result. (Goal INR 2.0-3.0)    Recent labs: (last 7 days)     07/20/23  1447   INR 1.8*       ASSESSMENT       Source(s): Chart Review and Patient/Caregiver Call       Warfarin doses taken: Warfarin taken as instructed    Diet: No new diet changes identified    Medication/supplement changes: None noted    New illness, injury, or hospitalization: No    Signs or symptoms of bleeding or clotting: No    Previous result: Therapeutic last 2(+) visits    Additional findings: None         PLAN     Recommended plan for no diet, medication or health factor changes affecting INR     Dosing Instructions: Continue your current warfarin dose with next INR in 2 weeks       Summary  As of 7/20/2023    Full warfarin instructions:  5 mg every Thu; 7.5 mg all other days   Next INR check:  8/3/2023             Telephone call with Shira who agrees to plan and repeated back plan correctly    Lab visit scheduled    Education provided:     Goal range and lab monitoring: goal range and significance of current result and Importance of following up at instructed interval    Contact [phone] with any changes, questions or concerns.     Plan made per [facility] anticoagulation protocol    YUNIER DAVIDSON RN  Anticoagulation Clinic  7/20/2023    _______________________________________________________________________     Anticoagulation Episode Summary     Current INR goal:  2.0-3.0   TTR:  60.6 % (11.3 mo)   Target end date:  Indefinite   Send INR reminders to:  [facility]    Indications    Pulmonary emboli (H) [I26.99]  Long term current use of anticoagulant therapy [Z79.01]           Comments:           Anticoagulation Care Providers     Provider Role Specialty Phone number    Anjel Busby MD Referring Family Medicine [phone]

## 2023-07-21 LAB
CHOLEST SERPL-MCNC: 191 MG/DL
FASTING STATUS PATIENT QL REPORTED: YES
GLUCOSE SERPL-MCNC: 97 MG/DL (ref 70–99)
HDLC SERPL-MCNC: 59 MG/DL
LDLC SERPL CALC-MCNC: 114 MG/DL
NONHDLC SERPL-MCNC: 132 MG/DL
TRIGL SERPL-MCNC: 89 MG/DL

## 2023-08-03 ENCOUNTER — LAB (OUTPATIENT)
Dept: LAB | Facility: CLINIC | Age: 63
End: 2023-08-03
Payer: COMMERCIAL

## 2023-08-03 ENCOUNTER — ANTICOAGULATION THERAPY VISIT (OUTPATIENT)
Dept: ANTICOAGULATION | Facility: CLINIC | Age: 63
End: 2023-08-03

## 2023-08-03 DIAGNOSIS — I26.99 PULMONARY EMBOLI (H): Primary | ICD-10-CM

## 2023-08-03 DIAGNOSIS — Z79.01 LONG TERM CURRENT USE OF ANTICOAGULANT THERAPY: ICD-10-CM

## 2023-08-03 LAB — INR BLD: 1.7 (ref 0.9–1.1)

## 2023-08-03 PROCEDURE — 85610 PROTHROMBIN TIME: CPT

## 2023-08-03 PROCEDURE — 36416 COLLJ CAPILLARY BLOOD SPEC: CPT

## 2023-08-03 NOTE — PROGRESS NOTES
ANTICOAGULATION MANAGEMENT     Shira Rodriguez 63 year old female is on warfarin with subtherapeutic INR result. (Goal INR 2.0-3.0)    Recent labs: (last 7 days)     08/03/23  1443   INR 1.7*       ASSESSMENT     Source(s): Chart Review and Patient/Caregiver Call     Warfarin doses taken: Warfarin taken as instructed  Diet: No new diet changes identified  Medication/supplement changes: None noted  New illness, injury, or hospitalization: No  Signs or symptoms of bleeding or clotting: No  Previous result: Subtherapeutic  Additional findings: None       PLAN     Recommended plan for no diet, medication or health factor changes affecting INR     Dosing Instructions: Increase your warfarin dose (5% change) with next INR in 2 weeks       Summary  As of 8/3/2023      Full warfarin instructions:  7.5 mg every day   Next INR check:  8/16/2023               Telephone call with Shira who verbalizes understanding and agrees to plan and who agrees to plan and repeated back plan correctly    Lab visit scheduled    Education provided:   Taking warfarin: Importance of taking warfarin as instructed  Goal range and lab monitoring: goal range and significance of current result and Importance of therapeutic range    Plan made per [facility] anticoagulation protocol    Mariah Sellers RN  Anticoagulation Clinic  8/3/2023    _______________________________________________________________________     Anticoagulation Episode Summary       Current INR goal:  2.0-3.0   TTR:  57.3 % (11.3 mo)   Target end date:  Indefinite   Send INR reminders to:  [facility]    Indications    Pulmonary emboli (H) [I26.99]  Long term current use of anticoagulant therapy [Z79.01]             Comments:               Anticoagulation Care Providers       Provider Role Specialty Phone number    Anjel Busby MD Referring Family Medicine [phone]

## 2023-08-16 ENCOUNTER — LAB (OUTPATIENT)
Dept: LAB | Facility: CLINIC | Age: 63
End: 2023-08-16
Payer: COMMERCIAL

## 2023-08-16 ENCOUNTER — ANTICOAGULATION THERAPY VISIT (OUTPATIENT)
Dept: ANTICOAGULATION | Facility: CLINIC | Age: 63
End: 2023-08-16

## 2023-08-16 DIAGNOSIS — Z79.01 LONG TERM CURRENT USE OF ANTICOAGULANT THERAPY: ICD-10-CM

## 2023-08-16 DIAGNOSIS — I26.99 PULMONARY EMBOLI (H): Primary | ICD-10-CM

## 2023-08-16 LAB — INR BLD: 1.2 (ref 0.9–1.1)

## 2023-08-16 PROCEDURE — 85610 PROTHROMBIN TIME: CPT

## 2023-08-16 PROCEDURE — 36416 COLLJ CAPILLARY BLOOD SPEC: CPT

## 2023-08-16 NOTE — PROGRESS NOTES
ANTICOAGULATION MANAGEMENT     Shira Rodriguez 63 year old female is on warfarin with subtherapeutic INR result. (Goal INR 2.0-3.0)    Recent labs: (last 7 days)     08/16/23  1414   INR 1.2*       ASSESSMENT     Source(s): Chart Review and Patient/Caregiver Call     Warfarin doses taken: Missed dose(s) may be affecting INR-missed 2 doses while away for the weekend  Diet: No new diet changes identified  Medication/supplement changes: None noted  New illness, injury, or hospitalization: No  Signs or symptoms of bleeding or clotting: No  Previous result: Subtherapeutic  Additional findings:  Maintenance dose increase last encounter (5%) , Shira is unable to recheck in the recommended 5-7 days[place]       PLAN     Recommended plan for temporary change(s) and ongoing change(s) affecting INR     Dosing Instructions: booster dose then continue your current warfarin dose with next INR in 2 weeks       Summary  As of 8/16/2023      Full warfarin instructions:  8/16: 12.5 mg; Otherwise 7.5 mg every day   Next INR check:  8/31/2023               Telephone call with Shira who agrees to plan and repeated back plan correctly    Lab visit scheduled    Education provided:   Taking warfarin: Importance of taking warfarin as instructed  Goal range and lab monitoring: goal range and significance of current result and Importance of following up at instructed interval  Contact [phone] with any changes, questions or concerns.     Plan made per [facility] anticoagulation protocol    YUNIER DAVIDSON RN  Anticoagulation Clinic  8/16/2023    _______________________________________________________________________     Anticoagulation Episode Summary       Current INR goal:  2.0-3.0   TTR:  56.1 % (11.3 mo)   Target end date:  Indefinite   Send INR reminders to:  [facility]    Indications    Pulmonary emboli (H) [I26.99]  Long term current use of anticoagulant therapy [Z79.01]             Comments:                Anticoagulation Care Providers       Provider Role Specialty Phone number    Anjel Busby MD Referring Family Medicine [phone]

## 2023-08-28 ENCOUNTER — ANTICOAGULATION THERAPY VISIT (OUTPATIENT)
Dept: ANTICOAGULATION | Facility: CLINIC | Age: 63
End: 2023-08-28

## 2023-08-28 ENCOUNTER — LAB (OUTPATIENT)
Dept: LAB | Facility: CLINIC | Age: 63
End: 2023-08-28
Payer: COMMERCIAL

## 2023-08-28 DIAGNOSIS — I26.99 PULMONARY EMBOLI (H): Primary | ICD-10-CM

## 2023-08-28 DIAGNOSIS — Z79.01 LONG TERM CURRENT USE OF ANTICOAGULANT THERAPY: ICD-10-CM

## 2023-08-28 LAB — INR BLD: 1.5 (ref 0.9–1.1)

## 2023-08-28 PROCEDURE — 36416 COLLJ CAPILLARY BLOOD SPEC: CPT

## 2023-08-28 PROCEDURE — 85610 PROTHROMBIN TIME: CPT

## 2023-08-28 NOTE — PROGRESS NOTES
ANTICOAGULATION MANAGEMENT     Shira Rodriguez 63 year old female is on warfarin with subtherapeutic INR result. (Goal INR 2.0-3.0)    Recent labs: (last 7 days)     08/28/23  1441   INR 1.5*       ASSESSMENT     Warfarin Lab Questionnaire    Warfarin Doses Last 7 Days          8/28/2023   Warfarin Lab Questionnaire   Missed doses within past 14 days? No   Changes in diet or alcohol within past 14 days? No   Medication changes since last result? No   Injuries or illness since last result? No   New shortness of breath, severe headaches or sudden changes in vision since last result? No   Abnormal bleeding since last result? No   Upcoming surgery, procedure? No   Best number to call with results? [phone]     Previous result: Subtherapeutic  Additional findings: None       PLAN     Recommended plan for no diet, medication or health factor changes affecting INR     Dosing Instructions: Increase your warfarin dose (14.3% change) with next INR in 3 weeks  (unable to recheck in 2 weeks)    Summary  As of 8/28/2023      Full warfarin instructions:  10 mg every Tue, Thu, Sat; 7.5 mg all other days   Next INR check:  9/22/2023               Telephone call with Shira who agrees to plan and repeated back plan correctly    Lab visit scheduled    Education provided:   Goal range and lab monitoring: goal range and significance of current result and Importance of following up at instructed interval  Symptom monitoring: monitoring for bleeding signs and symptoms  Contact [phone] with any changes, questions or concerns.     Plan made per [facility] anticoagulation protocol    YUNIER DAVIDSON RN  Anticoagulation Clinic  8/28/2023    _______________________________________________________________________     Anticoagulation Episode Summary       Current INR goal:  2.0-3.0   TTR:  53.3 % (11.3 mo)   Target end date:  Indefinite   Send INR reminders to:  [facility]    Indications    Pulmonary emboli (H) [I26.99]  Long term  current use of anticoagulant therapy [Z79.01]             Comments:               Anticoagulation Care Providers       Provider Role Specialty Phone number    Anjel Busby MD Referring Family Medicine [phone]

## 2023-08-30 NOTE — PROGRESS NOTES
CHIEF COMPLAINT:  Pain of the Right Knee     HISTORY OF PRESENT ILLNESS  Ms. Rodriguez is a pleasant 63 year old female who presents to clinic today with right knee pain.  Shira explains that her right knee has been bothering her for a long time. She has seen Dr Saxena and Dr Duque previously and received cortisone injections. The most recent injection was November 28th 2022 that provided about 6 months of relief. Now the pain has returned and the knee is stiff and painful. The patient would like to get another injection today     Onset: gradual  Location: right knee  Quality:  sharp  Duration: 5+ years   Severity:intermittent episodes with walking and weightbearing, no pain at rest  Modifying factors:  resting and non-use makes it better, movement and use makes it worse  Associated signs & symptoms: pain  Previous similar pain: No  Treatments to date: right knee cortisone injection (most recent 11/28/22, Dr. Duque, provided relief for 6 months), tylenol    Additional history: as documented    Review of Systems:  A 10-point review of systems was obtained and is negative except for as noted in the HPI.       MEDICAL HISTORY  Patient Active Problem List   Diagnosis    Primary localized osteoarthrosis, pelvic region and thigh    Adjustment disorder with depressed mood    Tobacco use disorder    Morbid obesity (H)    Arthritis of knee, degenerative    Degeneration of cervical intervertebral disc    CYRUS (obstructive sleep apnea)    Elevated blood pressure (not hypertension)    Pulmonary emboli (H)    Rheumatoid arthritis (H)    Cardiac arrest (H)    Long term current use of anticoagulant therapy    Bacterial sepsis (H)    Neutropenic fever (H)    Loss of hair    Seborrheic dermatitis       Current Outpatient Medications   Medication Sig Dispense Refill    acetaminophen (TYLENOL) 325 MG tablet Take 2 tablets (650 mg) by mouth every 6 hours as needed for mild pain or fever      ARIPiprazole (ABILIFY) 5 MG tablet Take 1  "tablet by mouth daily at 2 pm      cyanocobalamin (VITAMIN B-12) 500 MCG SUBL sublingual tablet Place 2 tablets (1,000 mcg) under the tongue daily 180 tablet 1    ferrous sulfate (FEROSUL) 325 (65 Fe) MG tablet Take 1 tablet (325 mg) by mouth daily . Take with citrus. 30 tablet 3    folic acid (FOLVITE) 1 MG tablet Take 2 tablets (2 mg) by mouth daily 180 tablet 1    lisinopril (ZESTRIL) 5 MG tablet Take 1 tablet (5 mg) by mouth daily 90 tablet 3    melatonin 3 MG tablet Take 1 tablet (3 mg) by mouth nightly as needed for sleep      multivitamin w/minerals (THERA-VIT-M) tablet Take 1 tablet by mouth daily 100 tablet 1    nystatin (MYCOSTATIN) 023754 UNIT/GM external powder Apply topically 4 times daily as needed 60 g 1    senna-docusate (SENOKOT-S/PERICOLACE) 8.6-50 MG tablet Take 1 tablet by mouth 2 times daily as needed for constipation 180 tablet 1    VITAMIN D3 25 MCG (1000 UT) tablet TAKE 2 TABLETS BY MOUTH EVERY DAY      warfarin ANTICOAGULANT (COUMADIN) 5 MG tablet Take 1.5 to 2 tablets by mouth every day OR as directed by Anticoagulation Clinic-adjusted per INR results 180 tablet 1    Warfarin Therapy Reminder Take 1 each by mouth See Admin Instructions      albuterol (PROAIR HFA/PROVENTIL HFA/VENTOLIN HFA) 108 (90 Base) MCG/ACT inhaler Inhale 2 puffs into the lungs every 6 hours as needed for shortness of breath / dyspnea or wheezing (Patient not taking: Reported on 5/24/2023) 18 g 5    atorvastatin (LIPITOR) 40 MG tablet Take 1 tablet (40 mg) by mouth every evening (Patient not taking: Reported on 5/24/2023) 30 tablet 0    DULoxetine (CYMBALTA) 60 MG capsule Take 1 capsule (60 mg) by mouth daily (Patient taking differently: Take 120 mg by mouth daily) 30 capsule 0    gabapentin (NEURONTIN) 100 MG capsule Take 1 capsule (100 mg) by mouth 3 times daily (Patient not taking: Reported on 5/24/2023) 90 capsule 0    insulin syringe-needle U-100 (BD INSULIN SYRINGE ULTRAFINE) 30G X 1/2\" 1 ML miscellaneous For " "Methotrexate use weekly. (Patient not taking: Reported on 2023) 12 each 3    ketoconazole (NIZORAL) 2 % external shampoo Apply topically daily as needed for itching or irritation 120 mL 2    miconazole (MICATIN) 2 % external cream Apply topically 2 times daily (Patient not taking: Reported on 2023)      pantoprazole (PROTONIX) 40 MG EC tablet Take 1 tablet (40 mg) by mouth 2 times daily (before meals) (Patient not taking: Reported on 2023)         Allergies   Allergen Reactions    Adhesive Tape Blisters    Erythromycin Rash       Family History   Problem Relation Age of Onset    Thyroid Disease Mother     Hypertension Mother     Skin Cancer Mother         MMohs surgery with skin graft    Cerebrovascular Disease Mother         CVA after endarderectomy-     Diabetes Mother     Depression Mother     Alcoholism Father     Heart Disease Father     Substance Abuse Father             Heart Disease Sister         multiple ablations    Alcoholism Sister     Substance Abuse Sister         Sober    Depression Sister     Substance Abuse Sister         Sober 30 plus years    Depression Sister     Thyroid Disease Sister     Hypertension Maternal Grandmother     Depression Maternal Grandmother     Breast Cancer Paternal Grandmother     Pancreatic Cancer Paternal Grandmother     Prostate Cancer Paternal Grandfather     Cardiovascular Paternal Aunt     Cardiovascular Paternal Uncle     Depression Cousin     Depression Other        Additional medical/Social/Surgical histories reviewed in [facility] and updated as appropriate.       PHYSICAL EXAM  /70   Ht 1.676 m (5' 6\")   Wt 121.1 kg (267 lb)   BMI 43.09 kg/m      General  - normal appearance, in no obvious distress  Musculoskeletal - left knee  - stance: mildly antalgic gait, mild genu varum  - inspection: trace effusion  - palpation: medial joint line tenderness  - ROM: 110 degrees flexion, 0 degrees extension, painful active ROM  - strength: " 5/5 in flexion, 5/5 in extension  - special tests:  (-) Avelina  (-) varus at 0 and 30 degrees flexion  (-) valgus at 0 and 30 degrees flexion  Neuro  - no sensory or motor deficit, grossly normal coordination, normal muscle tone       IMAGING :   Exam: Single frontal view of both knees and a lateral and axial view  of the right knee dated 12/31/2019.     COMPARISON: 8/2/2017.     CLINICAL HISTORY: Right knee pain.     FINDINGS:      Single frontal view of both knees and a lateral and axial view of the  right knee were obtained.     Left knee: Post surgical changes of a left total knee arthroplasty  without complication.     Right knee: Tricompartmental osteophytic spurring in the right knee  with joint space narrowing in the medial femorotibial joint  compartment of the right knee. No large right knee joint effusion.                                                                      IMPRESSION:  1. Postsurgical changes of a left total knee arthroplasty without  complication.  2. Osteoarthrosis in the right knee with joint space narrowing,  greatest in the medial femorotibial joint compartment.     HONG RAMIREZ MD     ASSESSMENT & PLAN  Ms. Rodriguez is a 63 year old year old female hx of left knee TKA who presents to clinic today with acute on chronic right knee pain secondary to known osteoarthritis of right knee.    Last injection provided 6+ months relief.    Diagnosis: Primary osteoarthritis right knee    -Repeat corticosteroid injection today as this has provided ongoing benefit.  Can for antalgic gait and stability.  Follow up PRN 6 months.    PROCEDURE    Right Knee Injection - Intraarticular  The patient was informed of the risks and the benefits of the procedure and a written consent was signed.  The patient s right knee was prepped with chlorhexidine in sterile fashion.   40 mg of triamcinolone suspension was drawn up into a 5 mL syringe with 4 mL of 1% lidocaine.  Injection was performed using  substerile technique.  A 1.5-inch 22-gauge needle was used to enter the lateral aspect of the right knee.  Injection performed successfully without difficulty.  There were no complications. The patient tolerated the procedure well. There was negligible bleeding.   The patient was instructed to ice the knee upon leaving clinic and refrain from overuse over the next 3 days.   The patient was instructed to call or go to the emergency room with any unusual pain, swelling, redness, or if otherwise concerned.  A follow up appointment will be scheduled to evaluate response to the injection, and to assess range of motion and pain.      It was a pleasure seeing Shira today.    Tashi Joy DO, [facility]  Primary Care Sports Medicine     Large Joint Injection/Arthocentesis: R knee joint    Date/Time: 9/7/2023 4:35 PM    Performed by: Tashi Joy DO  Authorized by: Tashi Joy DO    Indications:  Pain  Needle Size:  22 G  Guidance: landmark guided    Approach:  Anterolateral  Location:  Knee      Medications:  40 mg triamcinolone 40 MG/ML; 4 mL lidocaine 1 %  Outcome:  Tolerated well, no immediate complications  Procedure discussed: discussed risks, benefits, and alternatives    Consent Given by:  Patient  Timeout: timeout called immediately prior to procedure    Prep: patient was prepped and draped in usual sterile fashion

## 2023-09-01 NOTE — PROGRESS NOTES
ANTICOAGULATION MANAGEMENT     Shira Rodriguez 62 year old female is on warfarin with supratherapeutic INR result. (Goal INR 2.0-3.0)    Recent labs: (last 7 days)     05/24/22  0959   INR 3.2*       ASSESSMENT       Source(s): Chart Review and Patient/Caregiver Call       Warfarin doses taken: Warfarin taken as instructed    Diet: No new diet changes identified    New illness, injury, or hospitalization: No    Medication/supplement changes: None noted    Signs or symptoms of bleeding or clotting: No    Previous INR: Supratherapeutic    Additional findings: None       PLAN     Recommended plan for no diet, medication or health factor changes affecting INR     Dosing Instructions: decrease your warfarin dose (7.1% change) with next INR in 2 weeks       (pt already took warfarin today, new dosing plan will start tomorrow)    Summary  As of 5/24/2022    Full warfarin instructions:  2.5 mg every Wed; 5 mg all other days   Next INR check:  6/7/2022             Telephone call with Shira who verbalizes understanding and agrees to plan    Lab visit scheduled    Education provided: Goal range and significance of current result and Importance of therapeutic range    Plan made per [facility] anticoagulation protocol    Francis Ellis RN  Anticoagulation Clinic  5/24/2022    _______________________________________________________________________     Anticoagulation Episode Summary     Current INR goal:  2.0-3.0   TTR:  67.4 % (1 y)   Target end date:  Indefinite   Send INR reminders to:  [facility]    Indications    Pulmonary emboli (H) [I26.99]  Long term current use of anticoagulant therapy [Z79.01]           Comments:  HIPPA Forms returned 11/13/19  OK to leave messages on Cell  [phone]         Anticoagulation Care Providers     Provider Role Specialty Phone number    Anjel Busby MD Referring Family Medicine [phone]          
Yanet

## 2023-09-05 DIAGNOSIS — I27.82 CHRONIC PULMONARY EMBOLISM, UNSPECIFIED PULMONARY EMBOLISM TYPE, UNSPECIFIED WHETHER ACUTE COR PULMONALE PRESENT (H): ICD-10-CM

## 2023-09-05 RX ORDER — WARFARIN SODIUM 5 MG/1
TABLET ORAL
Qty: 180 TABLET | Refills: 1 | Status: SHIPPED | OUTPATIENT
Start: 2023-09-05 | End: 2024-04-04

## 2023-09-05 NOTE — TELEPHONE ENCOUNTER
ANTICOAGULATION MANAGEMENT:  Medication Refill    Anticoagulation Summary  As of 8/28/2023      Warfarin maintenance plan:  10 mg (5 mg x 2) every Tue, Thu, Sat; 7.5 mg (5 mg x 1.5) all other days   Next INR check:  9/22/2023   Target end date:  Indefinite    Indications    Pulmonary emboli (H) [I26.99]  Long term current use of anticoagulant therapy [Z79.01]                 Anticoagulation Care Providers       Provider Role Specialty Phone number    Anjel Busby MD Referring Family Medicine [phone]            Refill Criteria    Visit with referring provider/group: Meets criteria: office visit within referring provider group in the last 1 year on 5/24/23    [facility] referral signed last signed: 12/06/2022; within last year: Yes    Lab monitoring not exceeding 2 weeks overdue: No    Shira meets all criteria for refill. Rx instructions and quantity supplied updated to match patient's current dosing plan. Warfarin 90 day supply with 1 refill granted per [facility] protocol     YUNIER DAVIDSON RN  Anticoagulation Clinic

## 2023-09-07 ENCOUNTER — OFFICE VISIT (OUTPATIENT)
Dept: ORTHOPEDICS | Facility: CLINIC | Age: 63
End: 2023-09-07
Payer: COMMERCIAL

## 2023-09-07 VITALS
WEIGHT: 267 LBS | BODY MASS INDEX: 42.91 KG/M2 | DIASTOLIC BLOOD PRESSURE: 70 MMHG | HEIGHT: 66 IN | SYSTOLIC BLOOD PRESSURE: 112 MMHG

## 2023-09-07 DIAGNOSIS — M17.11 LOCALIZED OSTEOARTHRITIS OF RIGHT KNEE: Primary | ICD-10-CM

## 2023-09-07 PROCEDURE — 20610 DRAIN/INJ JOINT/BURSA W/O US: CPT | Mod: RT | Performed by: FAMILY MEDICINE

## 2023-09-07 RX ORDER — TRIAMCINOLONE ACETONIDE 40 MG/ML
40 INJECTION, SUSPENSION INTRA-ARTICULAR; INTRAMUSCULAR
Status: SHIPPED | OUTPATIENT
Start: 2023-09-07

## 2023-09-07 RX ORDER — LIDOCAINE HYDROCHLORIDE 10 MG/ML
4 INJECTION, SOLUTION INFILTRATION; PERINEURAL
Status: SHIPPED | OUTPATIENT
Start: 2023-09-07

## 2023-09-07 RX ADMIN — LIDOCAINE HYDROCHLORIDE 4 ML: 10 INJECTION, SOLUTION INFILTRATION; PERINEURAL at 16:35

## 2023-09-07 RX ADMIN — TRIAMCINOLONE ACETONIDE 40 MG: 40 INJECTION, SUSPENSION INTRA-ARTICULAR; INTRAMUSCULAR at 16:35

## 2023-09-21 ENCOUNTER — ANTICOAGULATION THERAPY VISIT (OUTPATIENT)
Dept: ANTICOAGULATION | Facility: CLINIC | Age: 63
End: 2023-09-21

## 2023-09-21 ENCOUNTER — LAB (OUTPATIENT)
Dept: LAB | Facility: CLINIC | Age: 63
End: 2023-09-21
Payer: COMMERCIAL

## 2023-09-21 DIAGNOSIS — D70.9 NEUTROPENIC FEVER (H): ICD-10-CM

## 2023-09-21 DIAGNOSIS — Z79.01 LONG TERM CURRENT USE OF ANTICOAGULANT THERAPY: ICD-10-CM

## 2023-09-21 DIAGNOSIS — D51.0 PERNICIOUS ANEMIA: ICD-10-CM

## 2023-09-21 DIAGNOSIS — D61.818 OTHER PANCYTOPENIA (H): ICD-10-CM

## 2023-09-21 DIAGNOSIS — I26.99 PULMONARY EMBOLI (H): Primary | ICD-10-CM

## 2023-09-21 DIAGNOSIS — R50.81 NEUTROPENIC FEVER (H): ICD-10-CM

## 2023-09-21 LAB — INR BLD: 1.7 (ref 0.9–1.1)

## 2023-09-21 PROCEDURE — 36415 COLL VENOUS BLD VENIPUNCTURE: CPT

## 2023-09-21 PROCEDURE — 85610 PROTHROMBIN TIME: CPT

## 2023-09-21 NOTE — PROGRESS NOTES
ANTICOAGULATION MANAGEMENT     Shira Rodriguez 63 year old female is on warfarin with subtherapeutic INR result. (Goal INR 2.0-3.0)    Recent labs: (last 7 days)     09/21/23  1451   INR 1.7*       ASSESSMENT     Warfarin Lab Questionnaire    Warfarin Doses Last 7 Days          9/21/2023   Warfarin Lab Questionnaire   Missed doses within past 14 days? No   Changes in diet or alcohol within past 14 days? No   Medication changes since last result? No   Injuries or illness since last result? No   New shortness of breath, severe headaches or sudden changes in vision since last result? No   Abnormal bleeding since last result? No   Upcoming surgery, procedure? No   Best number to call with results? [phone]     Previous result: Subtherapeutic  Additional findings:  Patient denies any missed doses or changes in her diet (no spinach or kale).       PLAN     Recommended plan for ongoing change(s) affecting INR     Dosing Instructions: Increase your warfarin dose (8.3% change) with next INR in 2 weeks       Summary  As of 9/21/2023      Full warfarin instructions:  7.5 mg every Mon, Fri; 10 mg all other days   Next INR check:  10/5/2023               Telephone call with Shira who verbalizes understanding and agrees to plan and who agrees to plan and repeated back plan correctly    Lab visit scheduled    Education provided:   None required    Plan made per [facility] anticoagulation protocol    Patience Martins, RN  Anticoagulation Clinic  9/21/2023    _______________________________________________________________________     Anticoagulation Episode Summary       Current INR goal:  2.0-3.0   TTR:  46.3 % (11.3 mo)   Target end date:  Indefinite   Send INR reminders to:   [facility]    Indications    Pulmonary emboli (H) [I26.99]  Long term current use of anticoagulant therapy [Z79.01]             Comments:               Anticoagulation Care Providers       Provider Role Specialty Phone number    Anjel Busby MD  [facility] [phone]

## 2023-09-22 RX ORDER — FOLIC ACID 1 MG/1
2 TABLET ORAL DAILY
Qty: 180 TABLET | Refills: 2 | Status: SHIPPED | OUTPATIENT
Start: 2023-09-22 | End: 2024-01-31

## 2023-09-22 NOTE — TELEPHONE ENCOUNTER
LCV:5/24/2023  [facility] [place]       lightheaded, dizziness

## 2023-10-05 ENCOUNTER — LAB (OUTPATIENT)
Dept: LAB | Facility: CLINIC | Age: 63
End: 2023-10-05
Payer: COMMERCIAL

## 2023-10-05 ENCOUNTER — ANTICOAGULATION THERAPY VISIT (OUTPATIENT)
Dept: ANTICOAGULATION | Facility: CLINIC | Age: 63
End: 2023-10-05

## 2023-10-05 DIAGNOSIS — I26.99 PULMONARY EMBOLI (H): Primary | ICD-10-CM

## 2023-10-05 DIAGNOSIS — Z79.01 LONG TERM CURRENT USE OF ANTICOAGULANT THERAPY: ICD-10-CM

## 2023-10-05 LAB — INR BLD: 3.5 (ref 0.9–1.1)

## 2023-10-05 PROCEDURE — 85610 PROTHROMBIN TIME: CPT

## 2023-10-05 PROCEDURE — 36416 COLLJ CAPILLARY BLOOD SPEC: CPT

## 2023-10-05 NOTE — PROGRESS NOTES
ANTICOAGULATION MANAGEMENT     Shira Rodriguez 63 year old female is on warfarin with supratherapeutic INR result. (Goal INR 2.0-3.0)    Recent labs: (last 7 days)     10/05/23  1449   INR 3.5*       ASSESSMENT     Warfarin Lab Questionnaire    Warfarin Doses Last 7 Days      10/5/2023     2:45 PM   Dose in Tablet or Mg   TAB or MG? milligram (mg)     Pt Rptd Dose LUCY MONDAY TUESDAY WED THURS FRIDAY SATURDAY   10/5/2023   2:45 PM 10 7.5 10 10 10 10 7.5         10/5/2023   Warfarin Lab Questionnaire   Missed doses within past 14 days? No   Changes in diet or alcohol within past 14 days? No   Medication changes since last result? No   Injuries or illness since last result? No   New shortness of breath, severe headaches or sudden changes in vision since last result? No   Abnormal bleeding since last result? No   Upcoming surgery, procedure? No   Best number to call with results? [phone]     Previous result: Subtherapeutic  Additional findings:  Taken differently, weekly dosing the same.     Maintenance dose increase last encounter (8.3% change).      PLAN     Recommended plan for ongoing change(s) affecting INR     Dosing Instructions: decrease your warfarin dose (3.8% change) with next INR in 2 weeks       Summary  As of 10/5/2023      Full warfarin instructions:  7.5 mg every Mon, Thu, Sat; 10 mg all other days   Next INR check:  10/19/2023               Telephone call with Shira who agrees to plan and repeated back plan correctly    Lab visit scheduled    Education provided:   Goal range and lab monitoring: goal range and significance of current result and Importance of following up at instructed interval  Symptom monitoring: monitoring for bleeding signs and symptoms  Contact [phone] with any changes, questions or concerns.     Plan made per [facility] anticoagulation protocol    YUNIER DAVIDSON RN  Anticoagulation Clinic  10/5/2023    _______________________________________________________________________      Anticoagulation Episode Summary       Current INR goal:  2.0-3.0   TTR:  48.1 % (11.3 mo)   Target end date:  Indefinite   Send INR reminders to:  [facility]    Indications    Pulmonary emboli (H) [I26.99]  Long term current use of anticoagulant therapy [Z79.01]             Comments:               Anticoagulation Care Providers       Provider Role Specialty Phone number    Anjel Busby MD Referring Family Medicine [phone]

## 2023-10-11 DIAGNOSIS — E61.1 LOW IRON: ICD-10-CM

## 2023-10-12 RX ORDER — FERROUS SULFATE 325(65) MG
325 TABLET ORAL DAILY
Qty: 30 TABLET | Refills: 3 | Status: SHIPPED | OUTPATIENT
Start: 2023-10-12 | End: 2024-01-31

## 2023-10-12 NOTE — TELEPHONE ENCOUNTER
ferrous sulfate (FEROSUL) 325 (65 Fe) MG tablet 30 tablet 3 5/26/2023          Last Office Visit : 5/24/23  Future Office visit:  none

## 2023-10-19 ENCOUNTER — LAB (OUTPATIENT)
Dept: LAB | Facility: CLINIC | Age: 63
End: 2023-10-19
Payer: COMMERCIAL

## 2023-10-19 ENCOUNTER — ANTICOAGULATION THERAPY VISIT (OUTPATIENT)
Dept: ANTICOAGULATION | Facility: CLINIC | Age: 63
End: 2023-10-19

## 2023-10-19 DIAGNOSIS — Z79.01 LONG TERM CURRENT USE OF ANTICOAGULANT THERAPY: ICD-10-CM

## 2023-10-19 DIAGNOSIS — I26.99 PULMONARY EMBOLI (H): Primary | ICD-10-CM

## 2023-10-19 LAB — INR BLD: 2.9 (ref 0.9–1.1)

## 2023-10-19 PROCEDURE — 36416 COLLJ CAPILLARY BLOOD SPEC: CPT

## 2023-10-19 PROCEDURE — 85610 PROTHROMBIN TIME: CPT

## 2023-10-19 NOTE — PROGRESS NOTES
ANTICOAGULATION MANAGEMENT     Shira Rodriguez 63 year old female is on warfarin with therapeutic INR result. (Goal INR 2.0-3.0)    Recent labs: (last 7 days)     10/19/23  1448   INR 2.9*       ASSESSMENT     Warfarin Lab Questionnaire    Warfarin Doses Last 7 Days          10/19/2023   Warfarin Lab Questionnaire   Missed doses within past 14 days? No   Changes in diet or alcohol within past 14 days? No   Medication changes since last result? No   Injuries or illness since last result? No   New shortness of breath, severe headaches or sudden changes in vision since last result? No   Abnormal bleeding since last result? No   Upcoming surgery, procedure? No   Best number to call with results? [phone]     Previous result: Supratherapeutic  Additional findings: None       PLAN     Recommended plan for no diet, medication or health factor changes affecting INR     Dosing Instructions: Continue your current warfarin dose with next INR in 2 weeks       Summary  As of 10/19/2023      Full warfarin instructions:  7.5 mg every Mon, Thu, Sat; 10 mg all other days   Next INR check:  11/2/2023               Telephone call with Shira who verbalizes understanding and agrees to plan and who agrees to plan and repeated back plan correctly    Lab visit scheduled    Education provided:   Taking warfarin: Importance of taking warfarin as instructed  Goal range and lab monitoring: goal range and significance of current result and Importance of therapeutic range    Plan made per [facility] anticoagulation protocol    Mariah Sellers RN  Anticoagulation Clinic  10/19/2023    _______________________________________________________________________     Anticoagulation Episode Summary       Current INR goal:  2.0-3.0   TTR:  48.8% (11.3 mo)   Target end date:  Indefinite   Send INR reminders to:  [facility]    Indications    Pulmonary emboli (H) [I26.99]  Long term current use of anticoagulant therapy [Z79.01]             Comments:                Anticoagulation Care Providers       Provider Role Specialty Phone number    Anjel Busby MD Referring Family Medicine [phone]

## 2023-10-25 NOTE — PROGRESS NOTES
Rheumatology Clinic  Everett Austin MD  10/26/2023     Name: Shira Rodriguez  MRN: [id number]  Age: 63 year old  : 1960  Referring provider: Anjel Bsuby     Assessment and Plan:  # Inflammatory Arthritis (+RF/+CCP, dx 2017 with diffuse polyarticular inflammatory flare):   Ms. Rodriguez reports resurgence of multiple joint pain, primarily affecting both hands and fingers, as well as the right greater than left midfoot.  Morning stiffness has increased in duration.  Physical exam shows mild tenderness without gross synovitis at the right dorsal midfoot.  Hands show diminished fist formation and  strength.  There is thenar eminence wasting right greater than left.  Tenderness at multiple MCPs, thickening at MCP 2 and 3 on the right.    Data: In May 2023 CBC was normal except for slightly reduced telemetry hemoglobin at 11.5.  In March, Creatinine was elevated at 1.72; LFTs were normal.  In 2022 rheumatoid factor was greater than 200 international units, increased in titer significantly since last measurement in .  Cyclic citrullinated peptide antibodies were present at 11 units, not significantly different from the low titer observed in 2017.     Discussion: Bilateral knuckle and finger pain, prolonged morning stiffness, and right midfoot pain, along with signs of synovitis on exam, are compatible with flaring inflammatory arthritis.  I recommend foot x-rays to rule out fracture, and blood work to evaluate inflammation, organ function.  Patient formerly had excellent control of symptoms with methotrexate monotherapy, which had been discontinued from January through 2022.  I do not think that patient should restart methotrexate, as this drug was associated with development of life and organ threatening sepsis in early .  Rather, I recommend that she start treatment with hydroxychloroquine.  I expect benefit gradually to accrue over 3 to 4 months.  If insufficient benefit  occurs due to hydroxychloroquine alone, would recommend addition of abatacept.  Shoulder and knee predominant pain present mechanical, degenerative joint disease generated pain, and would not be expected to respond to immunomodulatory therapy.    Unfortunately, recent methotrexate use was associated with severe myelosuppressive effects and marked mucositis, likely contributing to recent prolonged hospitalization for sepsis.  I recommend that methotrexate be avoided indefinitely.  If immunomodulatory therapy is warranted for recurrent inflammatory joint symptoms, I will recommend use of targeted therapy with no myelosuppressive potential, including for example abatacept.    #Shoulder osteoarthritis: Xrays showed joint space narrowing of glenohumeral joints and subluxation with inferior migration of the humeral heads.  We discussed my impression that shoulder pain does not reflect active inflammatory arthritis, and that immunomodulatory medication would not likely improve pain in the shoulders.  I recommend a symptomatic local treatment approach, including physical therapy, to be coordinated through sports/orthopedics    # Severe obesity  # CKD3  # Right knee osteoarthritis: Knee injected in October 2023 by orthopedics.  # Bilateral shoulder pain: Did not discuss today. X-rays suggested presence of advanced degenerative arthritis.  I recommend follow-up with sports medicine/orthopedics, physical therapy.     Plan:  1.  Prednisone 15 mg daily for 7 days, then 10 mg daily for 7 days, then 5 mg daily for 7 days, then off alert rheumatology to the effects of prednisone in 3 to 4 days.  2.  Start hydroxychloroquine 200 mg 1 tablet twice daily.  Expect gradual benefit with suppression of joint pain over 3 to 4 months.  3.  X-ray of both feet to rule out fracture/dislocation.  4.  Utilize acetaminophen 1000 mg 3 times daily for pain.  For residual pain, use Zostrix (capsicin) cream 2-3 times daily    RTC 6 mos    Everett  EVELYN Austin.  Staff Rheumatologist, [facility]  Pager [phone]        HPI:   Shira Rodriguez has a history of rheumatoid arthritis who presents for follow up. I last saw the patient 9-22. Seropositive rheumatoid arthritis was well controlled despite having discontinued methotrexate in spring 2022.  I recommended continued watchful waiting off methotrexate.    Rheumatological history:  Referrred 2/2017 for 3 months of BL hand pain thought consistent with CTS and positive RF/CCP. EMG showed moderate median neuropathy on L and severe on R, MRI c-spine normal. She received R CT injection by [facility] with 9 months of relief. She did describe intermittent episodes of swelling of her feet/ankles around this time period as well that resolved with medrol dose pack. She was re-evaluated 8/2017 after developing polyarticular inflammatory arthritis of the right knee, ankles, wrists, hands, and shoulders. She was started on MTX, given depomedrol IM injection, oral prednisone, and given R knee CS injection. MTX increased and oral prednisone tapered to off in 9/2017. Had repeat R CT injection in 10/2017 that lasted until 1/2018. R knee significantly improved following CS injection 12/2017. Has persistent pain in L heel with enthesopathy on imaging, unclear if had true enthesitis in this region due to her RA. Participated in pool therapy winter 2018 with good response. At her 3/2018 OV she continued to have significant pain in her R wrist thought due to CT, but given some ongoing persistent EMS (BL hand stiffness lasting ALL day) as well and her known seropositive disease I recommended adding Humira to her regimen. This was ordered but she never started. Methotrexate Rx continued through 2021, drug stopped as of early 2022. Methotrexate restarted in fall 2022, stopped in January 2023, when patient was admitted to hospital with mucositis and severe cytopenias, severe B12 and folate deficiencies.    Interval history October 26,  2023    Today, she reports R foot pain for several weeks. No swelling; pain worse with wt bearing, but constant, no help with tyllenol. No injury. Wrists and shoulders hurt with pressure/wt bearing.    Both hands are painful, stiff, and have been for many weeks. No help with OTC acetaminophen.  R hand goes numb when she eats.    Early morning stiffness lasts hours.    She has continued to recover from critical illness in 2-2023. She can independently feed/dress self.    Rknee pain improved after knee injection in 3-23, but sx recurred. Orthopedics injected again in 10-23. L shoulder also injected.  She has not used oral prednisone for months.      Interval history March 16, 2023    Reports that after the last visit, she misunderstood the therapeutic plan, which involved continued withholding of methotrexate and watchful waiting.  She reports that she continues methotrexate weekly at 20 mg (8 tablets) as directed by prescription.  She used methotrexate right up until hospitalization in January    Patient was admitted to [facility] on January 28, 2023, discharged on February 21, 2023.  Discharge diagnoses include severe sepsis, resolved, acute kidney injury on CKD stage III, resolved.  Upon admission, she had severe neutropenia and pancytopenia, associated with methotrexate use prior to admission.  She was treated with rehydration and empiric antibiotics as well as Neupogen.  Concern about methotrexate use as well as severe B12 and folate deficiency was raised as a cause of neutropenia.  Bone marrow biopsy was negative for malignancy or myelodysplastic disorder.    Today, she reports that she completed a 1 week stay in the transitional care facility, and was discharged 1 week ago.  Since then, she reports joint pain, primarily in both shoulders and the right knee.  Right knee is particularly painful with weightbearing, but also provides night pain.  She has not noticed swelling or warmth. Last  injection 6 months ago gave her 3 months of relief.    Shoulders are painful with raising the arms, getting dressed, lifting and reaching.  Pain is much less when shoulders are at rest.  Small joints of the hands and feet have not been painful.  She denies prolonged morning stiffness.      Interval history September 29, 2022    She stopped methotrexate 6 months ago because she could not get syringes.  She reports the happy circumstance that former wrist hand and foot swelling stiffness and pain has not recurred despite being off the methotrexate for 6 months.  Morning stiffness is less than 10 minutes.  She has knee pain that is made worse with walking on uneven ground or when navigating stairs.  But no pain or swelling visible in knees or other joints.  She has not required prednisone or regular use of nonsteroidals since last visit.    Interval history 05-:    Overall joints are doing well.  She has some soreness with flexion of the second and third DIPs in the right hand.  Otherwise there is no swelling, stiffness, redness, or restriction in motion of hand and finger joints.  Knees bother her with prolonged walking, but she looks forward to returning to the pool after the pandemic for aqua exercise.  She believes that the methotrexate has been associated with greater than 80% improvement in joint pain and other symptoms.    Unrelenting fatigue continues.  She notes a new source of exhaustion and that her mother recently had a stroke and the patient is is sharing personal care duties with a sister.    She is remained on methotrexate through the COVID-19 pandemic.  No further oral ulcerations.  She has not been using leucovorin.  She has used folate 3 mg daily.    Prior history  2-2020    Today, the patient reports that she has been doing well since her last visit. She states that her oral lesions resolved with  leucovorin and folic acid. She also notes that her joint pain and decreased range of motion is  limited to her bilateral second digits in her hands. Her left second finger is more stiff than her right, and she denies locking or ratcheting in either finger. She notes that her morning stiffness typically resolves in 15 to 30 minutes. She has no significant swelling in her feet. She notes that she has very little swelling in her legs after starting lasix. She denies rash, shortness of breath, or raynaud's flares.     The patient states that she had a cough prior to her pulmonary embolism/myocardial infarction. She then developed pneumonia while intubated and required a thoracentesis while in the hospital. She reports that she had a persistent cough after this procedure and repeat ultrasound noted further fluid for which she had another thoracentesis which produced 600 ml of fluid. She notes that her cough is improved after her second thoracentesis but is still present. She states her pulmonologist is concerned her remaining cough could be related to her Methotrexate. She is set up for a repeat PFTs and Chest Xray in the coming months.           Review of Systems:   Pertinent items are noted in HPI or as below, remainder of complete ROS is negative.    No fevers/chills/sweats  No recent problems with hearing or vision. No swallowing problems.   No breathing difficulty, shortness of breath, coughing, or wheezing  No chest pain or palpitations  No heart burn, indigestion, abdominal pain, nausea, vomiting, diarrhea  No urination problems, no bloody, cloudy urine, no dysuria  No numbing, tingling, weakness  No headaches or confusion  No rashes. No easy bleeding or bruising.       Active Medications:   Current Outpatient Medications   Medication Sig    acetaminophen (TYLENOL) 325 MG tablet Take 2 tablets (650 mg) by mouth every 6 hours as needed for mild pain or fever    ARIPiprazole (ABILIFY) 5 MG tablet Take 1 tablet by mouth daily at 2 pm    cyanocobalamin (VITAMIN B-12) 500 MCG SUBL sublingual tablet Place 2  "tablets (1,000 mcg) under the tongue daily    ferrous sulfate (FEROSUL) 325 (65 Fe) MG tablet Take 1 tablet (325 mg) by mouth daily . Take with citrus.    folic acid (FOLVITE) 1 MG tablet Take 2 tablets (2 mg) by mouth daily    lisinopril (ZESTRIL) 5 MG tablet Take 1 tablet (5 mg) by mouth daily    multivitamin w/minerals (THERA-VIT-M) tablet Take 1 tablet by mouth daily    VITAMIN D3 25 MCG (1000 UT) tablet TAKE 2 TABLETS BY MOUTH EVERY DAY    warfarin ANTICOAGULANT (COUMADIN) 5 MG tablet Take 1.5 to 2 tablets by mouth every day OR as directed by Anticoagulation Clinic-adjusted per INR results    albuterol (PROAIR HFA/PROVENTIL HFA/VENTOLIN HFA) 108 (90 Base) MCG/ACT inhaler Inhale 2 puffs into the lungs every 6 hours as needed for shortness of breath / dyspnea or wheezing (Patient not taking: Reported on 5/24/2023)    atorvastatin (LIPITOR) 40 MG tablet Take 1 tablet (40 mg) by mouth every evening (Patient not taking: Reported on 5/24/2023)    DULoxetine (CYMBALTA) 60 MG capsule Take 1 capsule (60 mg) by mouth daily (Patient taking differently: Take 120 mg by mouth daily)    gabapentin (NEURONTIN) 100 MG capsule Take 1 capsule (100 mg) by mouth 3 times daily (Patient not taking: Reported on 5/24/2023)    insulin syringe-needle U-100 (BD INSULIN SYRINGE ULTRAFINE) 30G X 1/2\" 1 ML miscellaneous For Methotrexate use weekly. (Patient not taking: Reported on 2/23/2023)    ketoconazole (NIZORAL) 2 % external shampoo Apply topically daily as needed for itching or irritation    melatonin 3 MG tablet Take 1 tablet (3 mg) by mouth nightly as needed for sleep (Patient not taking: Reported on 10/26/2023)    miconazole (MICATIN) 2 % external cream Apply topically 2 times daily (Patient not taking: Reported on 5/24/2023)    nystatin (MYCOSTATIN) 806842 UNIT/GM external powder Apply topically 4 times daily as needed    pantoprazole (PROTONIX) 40 MG EC tablet Take 1 tablet (40 mg) by mouth 2 times daily (before meals) (Patient " "not taking: Reported on 5/24/2023)    senna-docusate (SENOKOT-S/PERICOLACE) 8.6-50 MG tablet Take 1 tablet by mouth 2 times daily as needed for constipation    Warfarin Therapy Reminder Take 1 each by mouth See Admin Instructions     Current Facility-Administered Medications   Medication    lidocaine 1 % injection 3 mL    lidocaine 1 % injection 4 mL    lidocaine 1 % injection 4 mL    methylPREDNISolone (DEPO-MEDROL) injection 40 mg    triamcinolone (KENALOG-40) injection 40 mg     Facility-Administered Medications Ordered in Other Visits   Medication    sodium chloride (PF) 0.9% PF flush 10 mL     Stopped lipitor       Allergies:   Adhesive tape and Erythromycin      Past Medical History:  Depression   Eczema   Hyperlipidemia   Hypertension   Morbid obesity   Osteoarthrosis right knee   Sleep apnea   Tobacco use disorder   Primary localized osteoarthrosis, pelvic region and thigh  Degeneration of cervical intervertebral disc  Chronic bronchitis   Pulmonary embolism   Cardiac arrest; required ECMO 9-2019     Past Surgical History:  Left total knee arthoplasty 6/14/2012   Right hip arthroplasty 07/09/2004      Family History:   Mother: thyroid disease, hypertension, skin cancer, CVA  Paternal grandmother: breast cancer, pancreatic cancer   Father: alcoholism   Sister: thyroid disease, alcoholism   Maternal grandmother: hypertension   Sister: heart disease, multiple ablations   Paternal grandfather: prostate cancer      Social History:   Current everyday cigarette smoker, 1 pack/day, 33 years, started 1982  No smokeless tobacco use  Occasional alcohol use   Physical Exam:   /69 (BP Location: Left arm, Patient Position: Sitting)   Pulse 100   Resp 18   Ht 1.676 m (5' 6\")   Wt 116.6 kg (257 lb)   SpO2 96%   BMI 41.48 kg/m     Wt Readings from Last 4 Encounters:   10/26/23 116.6 kg (257 lb)   09/07/23 121.1 kg (267 lb)   06/22/23 116.1 kg (256 lb)   05/24/23 120.5 kg (265 lb 11.2 oz)     Constitutional: " Well-developed, appearing stated age; cooperative, obese  Eyes: Normal EOM, PERRLA, vision, conjunctiva, sclera  ENT: Normal external ears, nose, hearing, lips, teeth, gums, throat. No mucous membrane lesions, normal saliva pool  Neck: No mass or thyroid enlargement  MS: Excellent alignment of MCPs and PIPs.  No tenderness or altered range of motion at MCPs, MTPs, or wrists.  Shoulders with painful flexion beyond 60 degrees.  Tenderness at left anterior shoulder.  Impingement, left greater than right.  Skin: No nail pitting, alopecia, nodules or lesions.    Neuro: Normal cranial nerves  Psych: Normal judgement, orientation, memory, affect.     Laboratory:       Latest Ref Rng & Units 2/21/2023     7:26 AM 3/31/2023     2:39 PM 5/8/2023     3:03 PM   RHEUM RESULTS   Albumin 3.5 - 5.2 g/dL  3.8     ALT 10 - 35 U/L  9     AST 10 - 35 U/L  15     Creatinine 0.51 - 0.95 mg/dL  1.72     GFR Estimate >60 mL/min/1.73m2  33     Hematocrit 35.0 - 47.0 % 24.2  32.9  35.2    Hemoglobin 11.7 - 15.7 g/dL 7.6  10.5  11.5    WBC 4.0 - 11.0 10e3/uL 14.8  10.5  6.1    RBC Count 3.80 - 5.20 10e6/uL 2.60  3.51  3.90    RDW 10.0 - 15.0 % 17.4  14.8  13.3    MCHC 31.5 - 36.5 g/dL 31.4  31.9  32.7    MCV 78 - 100 fL 93  94  90    Platelet Count 150 - 450 10e3/uL 269  302  321        Rheumatoid Factor   Date Value Ref Range Status   08/28/2017 51 (H) <20 IU/mL Final     Cyclic Citrullinated Peptide Antibody, IgG   Date Value Ref Range Status   08/28/2017 15 (H) <7 U/mL Final     Comment:     Positive     EMERY Screen by EIA   Date Value Ref Range Status   12/30/2016 <1.0  Interpretation:  Negative   <1.0 Final     Hepatitis B Core Caron   Date Value Ref Range Status   04/12/2017 Nonreactive NR Final     Hep B Surface Agn   Date Value Ref Range Status   04/12/2017 Nonreactive NR Final     IGG   Date Value Ref Range Status   04/21/2017 940 695 - 1,620 mg/dL Final     IGA   Date Value Ref Range Status   04/21/2017 271 70 - 380 mg/dL Final     IGM  "  Date Value Ref Range Status   04/21/2017 395 (H) 60 - 265 mg/dL Final     Chief Complaint   Patient presents with    RECHECK     Primary localized osteoarthrosis of right lower leg     Blood pressure 103/69, pulse 100, resp. rate 18, height 1.676 m (5' 6\"), weight 116.6 kg (257 lb), SpO2 96%, not currently breastfeeding.    "

## 2023-10-26 ENCOUNTER — OFFICE VISIT (OUTPATIENT)
Dept: RHEUMATOLOGY | Facility: CLINIC | Age: 63
End: 2023-10-26
Payer: COMMERCIAL

## 2023-10-26 VITALS
SYSTOLIC BLOOD PRESSURE: 103 MMHG | HEIGHT: 66 IN | RESPIRATION RATE: 18 BRPM | HEART RATE: 100 BPM | OXYGEN SATURATION: 96 % | DIASTOLIC BLOOD PRESSURE: 69 MMHG | BODY MASS INDEX: 41.3 KG/M2 | WEIGHT: 257 LBS

## 2023-10-26 DIAGNOSIS — M06.00 RHEUMATOID ARTHRITIS WITH NEGATIVE RHEUMATOID FACTOR, INVOLVING UNSPECIFIED SITE (H): ICD-10-CM

## 2023-10-26 DIAGNOSIS — M79.672 FOOT PAIN, BILATERAL: Primary | ICD-10-CM

## 2023-10-26 DIAGNOSIS — M79.671 FOOT PAIN, BILATERAL: Primary | ICD-10-CM

## 2023-10-26 PROCEDURE — 99214 OFFICE O/P EST MOD 30 MIN: CPT | Performed by: INTERNAL MEDICINE

## 2023-10-26 RX ORDER — PREDNISONE 5 MG/1
5 TABLET ORAL DAILY
Qty: 30 TABLET | Refills: 2 | Status: SHIPPED | OUTPATIENT
Start: 2023-10-26 | End: 2023-11-21

## 2023-10-26 RX ORDER — PREDNISONE 5 MG/1
TABLET ORAL
Qty: 49 TABLET | Refills: 2 | Status: SHIPPED | OUTPATIENT
Start: 2023-10-26 | End: 2023-11-16

## 2023-10-26 RX ORDER — HYDROXYCHLOROQUINE SULFATE 200 MG/1
200 TABLET, FILM COATED ORAL 2 TIMES DAILY
Qty: 60 TABLET | Refills: 3 | Status: SHIPPED | OUTPATIENT
Start: 2023-10-26 | End: 2024-02-25

## 2023-10-26 RX ORDER — CAPSAICIN 0.025 %
CREAM (GRAM) TOPICAL
Qty: 45 G | Refills: 1 | Status: SHIPPED | OUTPATIENT
Start: 2023-10-26 | End: 2024-01-31

## 2023-10-26 ASSESSMENT — PAIN SCALES - GENERAL: PAINLEVEL: EXTREME PAIN (9)

## 2023-10-26 NOTE — NURSING NOTE
"Chief Complaint   Patient presents with    RECHECK     Primary localized osteoarthrosis of right lower leg       Vitals:    10/26/23 1454   BP: 103/69   BP Location: Left arm   Patient Position: Sitting   Pulse: 100   Resp: 18   SpO2: 96%   Weight: 116.6 kg (257 lb)   Height: 1.676 m (5' 6\")       Body mass index is 41.48 kg/m .    Fauzia Banda, JACKSONF  "

## 2023-10-26 NOTE — PATIENT INSTRUCTIONS
Diagnosis:  1.  Flaring inflammatory/rheumatoid) arthritis.  I recommend starting a brief course of corticosteroids, and starting immune modulating medicine with hydroxychloroquine.    Plan:  1.  Prednisone 15 mg daily for 7 days, then 10 mg daily for 7 days, then 5 mg daily for 7 days, then off alert rheumatology to the effects of prednisone in 3 to 4 days.  2.  Start hydroxychloroquine 200 mg 1 tablet twice daily.  Expect gradual benefit with suppression of joint pain over 3 to 4 months.  3.  X-ray of both feet to rule out fracture/dislocation.  4.  Utilize acetaminophen 1000 mg 3 times daily for pain.  For residual pain, use Zostrix (capsicin) cream 2-3 times daily

## 2023-11-01 DIAGNOSIS — I46.9 CARDIAC ARREST (H): ICD-10-CM

## 2023-11-02 ENCOUNTER — LAB (OUTPATIENT)
Dept: LAB | Facility: CLINIC | Age: 63
End: 2023-11-02
Payer: COMMERCIAL

## 2023-11-02 ENCOUNTER — ANTICOAGULATION THERAPY VISIT (OUTPATIENT)
Dept: ANTICOAGULATION | Facility: CLINIC | Age: 63
End: 2023-11-02

## 2023-11-02 DIAGNOSIS — M06.00 RHEUMATOID ARTHRITIS WITH NEGATIVE RHEUMATOID FACTOR, INVOLVING UNSPECIFIED SITE (H): ICD-10-CM

## 2023-11-02 DIAGNOSIS — I26.99 PULMONARY EMBOLI (H): Primary | ICD-10-CM

## 2023-11-02 DIAGNOSIS — M05.79 RHEUMATOID ARTHRITIS INVOLVING MULTIPLE SITES WITH POSITIVE RHEUMATOID FACTOR (H): ICD-10-CM

## 2023-11-02 DIAGNOSIS — Z79.01 LONG TERM CURRENT USE OF ANTICOAGULANT THERAPY: ICD-10-CM

## 2023-11-02 LAB
ALBUMIN SERPL BCG-MCNC: 3.5 G/DL (ref 3.5–5.2)
ALP SERPL-CCNC: 57 U/L (ref 35–104)
ALT SERPL W P-5'-P-CCNC: 26 U/L (ref 0–50)
ANION GAP SERPL CALCULATED.3IONS-SCNC: 14 MMOL/L (ref 7–15)
AST SERPL W P-5'-P-CCNC: 22 U/L (ref 0–45)
BASOPHILS # BLD AUTO: 0 10E3/UL (ref 0–0.2)
BASOPHILS NFR BLD AUTO: 0 %
BILIRUB SERPL-MCNC: 0.2 MG/DL
BUN SERPL-MCNC: 34.9 MG/DL (ref 8–23)
CALCIUM SERPL-MCNC: 10.1 MG/DL (ref 8.8–10.2)
CHLORIDE SERPL-SCNC: 106 MMOL/L (ref 98–107)
CREAT SERPL-MCNC: 1.69 MG/DL (ref 0.51–0.95)
CRP SERPL-MCNC: 21.4 MG/L
DEPRECATED HCO3 PLAS-SCNC: 21 MMOL/L (ref 22–29)
EGFRCR SERPLBLD CKD-EPI 2021: 34 ML/MIN/1.73M2
EOSINOPHIL # BLD AUTO: 0.2 10E3/UL (ref 0–0.7)
EOSINOPHIL NFR BLD AUTO: 1 %
ERYTHROCYTE [DISTWIDTH] IN BLOOD BY AUTOMATED COUNT: 14.7 % (ref 10–15)
GLUCOSE SERPL-MCNC: 90 MG/DL (ref 70–99)
HCT VFR BLD AUTO: 39.9 % (ref 35–47)
HGB BLD-MCNC: 12.6 G/DL (ref 11.7–15.7)
IMM GRANULOCYTES # BLD: 0 10E3/UL
IMM GRANULOCYTES NFR BLD: 0 %
INR BLD: 3.3 (ref 0.9–1.1)
LYMPHOCYTES # BLD AUTO: 0.9 10E3/UL (ref 0.8–5.3)
LYMPHOCYTES NFR BLD AUTO: 8 %
MCH RBC QN AUTO: 29.2 PG (ref 26.5–33)
MCHC RBC AUTO-ENTMCNC: 31.6 G/DL (ref 31.5–36.5)
MCV RBC AUTO: 92 FL (ref 78–100)
MONOCYTES # BLD AUTO: 0.7 10E3/UL (ref 0–1.3)
MONOCYTES NFR BLD AUTO: 5 %
NEUTROPHILS # BLD AUTO: 10.2 10E3/UL (ref 1.6–8.3)
NEUTROPHILS NFR BLD AUTO: 85 %
PLATELET # BLD AUTO: 392 10E3/UL (ref 150–450)
POTASSIUM SERPL-SCNC: 4.9 MMOL/L (ref 3.4–5.3)
PROT SERPL-MCNC: 7.6 G/DL (ref 6.4–8.3)
RBC # BLD AUTO: 4.32 10E6/UL (ref 3.8–5.2)
SODIUM SERPL-SCNC: 141 MMOL/L (ref 135–145)
WBC # BLD AUTO: 12 10E3/UL (ref 4–11)

## 2023-11-02 PROCEDURE — 80053 COMPREHEN METABOLIC PANEL: CPT

## 2023-11-02 PROCEDURE — 86140 C-REACTIVE PROTEIN: CPT

## 2023-11-02 PROCEDURE — 36415 COLL VENOUS BLD VENIPUNCTURE: CPT

## 2023-11-02 PROCEDURE — 85610 PROTHROMBIN TIME: CPT

## 2023-11-02 PROCEDURE — 85025 COMPLETE CBC W/AUTO DIFF WBC: CPT

## 2023-11-02 NOTE — PROGRESS NOTES
ANTICOAGULATION MANAGEMENT     Shira Rodriguez 63 year old female is on warfarin with supratherapeutic INR result. (Goal INR 2.0-3.0)    Recent labs: (last 7 days)     11/02/23  1455   INR 3.3*       ASSESSMENT     Warfarin Lab Questionnaire    Warfarin Doses Last 7 Days      11/2/2023     2:45 PM   Dose in Tablet or Mg   TAB or MG? milligram (mg)     Pt Rptd Dose SUNDAY MONDAY TUESDAY WED THURS FRIDAY SATURDAY 11/2/2023   2:45 PM 10 7.5 10 10 7.5 10 7.5         11/2/2023   Warfarin Lab Questionnaire   Missed doses within past 14 days? No   Changes in diet or alcohol within past 14 days? No   Medication changes since last result? No   Injuries or illness since last result? No   New shortness of breath, severe headaches or sudden changes in vision since last result? No   Abnormal bleeding since last result? No   Upcoming surgery, procedure? No   Best number to call with results? [phone]     Previous result: Therapeutic last visit; previously outside of goal range  Additional findings: None       PLAN     Recommended plan for no diet, medication or health factor changes affecting INR     Dosing Instructions: Continue your current warfarin dose with next INR in 2 weeks       Summary  As of 11/2/2023      Full warfarin instructions:  7.5 mg every Mon, Thu, Sat; 10 mg all other days   Next INR check:  11/16/2023               Detailed voice message left for Shira with dosing instructions and follow up date.   Sent [employer] message with dosing and follow up instructions    Contact [phone] to schedule and with any changes, questions or concerns.     Education provided:   Please call back if any changes to your diet, medications or how you've been taking warfarin    Plan made per [facility] anticoagulation protocol    Rich Lopez, RN  Anticoagulation Clinic  11/2/2023    _______________________________________________________________________     Anticoagulation Episode Summary       Current INR goal:  2.0-3.0   TTR:   49.8% (11.3 mo)   Target end date:  Indefinite   Send INR reminders to:  [facility]    Indications    Pulmonary emboli (H) [I26.99]  Long term current use of anticoagulant therapy [Z79.01]             Comments:               Anticoagulation Care Providers       Provider Role Specialty Phone number    Anjel Busby MD Referring Family Medicine [phone]

## 2023-11-03 NOTE — TELEPHONE ENCOUNTER
lisinopril (ZESTRIL) 5 MG tablet 90 tablet 3 8/24/2022  Last Office Visit : 5/24/2023   Future Office visit:  none    Routing refill request to provider for review/approval because:  Cr abnormal, rx PENDED   Creatinine   Date Value Ref Range Status   11/02/2023 1.69 (H) 0.51 - 0.95 mg/dL Final   06/16/2021 0.74 0.52 - 1.04 mg/dL Final

## 2023-11-06 ENCOUNTER — TELEPHONE (OUTPATIENT)
Dept: PULMONOLOGY | Facility: CLINIC | Age: 63
End: 2023-11-06
Payer: COMMERCIAL

## 2023-11-06 NOTE — TELEPHONE ENCOUNTER
Patient Contacted for the patient to call back and schedule the following:    Appointment type: IVON  Provider: KELLEN  Return date: 01/22/2024  Specialty phone number: [phone]  Additional appointment(s) needed: N/A  Additonal Notes: N/A

## 2023-11-12 RX ORDER — LISINOPRIL 5 MG/1
5 TABLET ORAL DAILY
Qty: 90 TABLET | Refills: 1 | Status: SHIPPED | OUTPATIENT
Start: 2023-11-12 | End: 2024-05-07

## 2023-11-20 ENCOUNTER — MYC MEDICAL ADVICE (OUTPATIENT)
Dept: RHEUMATOLOGY | Facility: CLINIC | Age: 63
End: 2023-11-20

## 2023-11-20 ENCOUNTER — ANCILLARY PROCEDURE (OUTPATIENT)
Dept: GENERAL RADIOLOGY | Facility: CLINIC | Age: 63
End: 2023-11-20
Attending: INTERNAL MEDICINE
Payer: COMMERCIAL

## 2023-11-20 DIAGNOSIS — M79.672 FOOT PAIN, BILATERAL: ICD-10-CM

## 2023-11-20 DIAGNOSIS — M06.00 RHEUMATOID ARTHRITIS WITH NEGATIVE RHEUMATOID FACTOR, INVOLVING UNSPECIFIED SITE (H): ICD-10-CM

## 2023-11-20 DIAGNOSIS — M79.671 FOOT PAIN, BILATERAL: ICD-10-CM

## 2023-11-20 PROCEDURE — 73630 X-RAY EXAM OF FOOT: CPT | Mod: TC | Performed by: RADIOLOGY

## 2023-11-21 RX ORDER — PREDNISONE 5 MG/1
TABLET ORAL
Qty: 48 TABLET | Refills: 1 | Status: SHIPPED | OUTPATIENT
Start: 2023-11-21 | End: 2023-12-19

## 2023-11-21 NOTE — TELEPHONE ENCOUNTER
Patient calling with foot and hip pain.  Given prednisone taper 10/26 (15 mg daily for 7 days, then 10 mg daily for 7 days, then 5 mg daily for 7 days).  Finished taper last week.  C/o stabbing pain in the feet altagracia with walking.  Denies any redness, swelling, or warmth to the feet.  Having to use her walker to walk as walking is very difficult.  Patient would like additional steroids to help with the pain.     Arina Allison RN

## 2023-11-29 ENCOUNTER — TELEPHONE (OUTPATIENT)
Dept: ANTICOAGULATION | Facility: CLINIC | Age: 63
End: 2023-11-29
Payer: COMMERCIAL

## 2023-11-29 NOTE — TELEPHONE ENCOUNTER
ANTICOAGULATION     Shira Rodriguez is overdue for an INR check.     Left message for patient to call and schedule lab appointment as soon as possible. If returning call, please schedule.     Mariah Sellers RN

## 2023-12-06 ENCOUNTER — MYC MEDICAL ADVICE (OUTPATIENT)
Dept: ANTICOAGULATION | Facility: CLINIC | Age: 63
End: 2023-12-06
Payer: COMMERCIAL

## 2023-12-18 ENCOUNTER — DOCUMENTATION ONLY (OUTPATIENT)
Dept: ANTICOAGULATION | Facility: CLINIC | Age: 63
End: 2023-12-18
Payer: COMMERCIAL

## 2023-12-18 NOTE — LETTER
[facility]  [address] [address]  Phone: [phone]  Fax: [phone]   December 18, 2023        Shira Rodriguez  [address]   [place] [address]            Dear Shira,    You are currently under the care of [facility] for your warfarin (Coumadin , Jantoven ) therapy.  We are contacting you because our records show you were due for an INR on 11/16/23.    There are potentially serious risks when taking warfarin without careful monitoring and we want to make sure you are safely managed.  Routine lab monitoring is required for warfarin refills.     Please call [phone] as soon as possible to schedule a lab appointment. If it is difficult for you to get to lab, please call us to discuss options.  If there has been a change in your care or other concerns, please let us know so we can help and/or update our records.         Sincerely,       [facility]

## 2023-12-18 NOTE — PROGRESS NOTES
ANTICOAGULATION     Shira Rodriguez is overdue for an INR check.     Reminder letter sent    YUNIER DAVIDSON RN

## 2023-12-19 ENCOUNTER — MYC MEDICAL ADVICE (OUTPATIENT)
Dept: RHEUMATOLOGY | Facility: CLINIC | Age: 63
End: 2023-12-19
Payer: COMMERCIAL

## 2023-12-19 DIAGNOSIS — M06.00 RHEUMATOID ARTHRITIS WITH NEGATIVE RHEUMATOID FACTOR, INVOLVING UNSPECIFIED SITE (H): ICD-10-CM

## 2023-12-19 RX ORDER — PREDNISONE 5 MG/1
TABLET ORAL
Qty: 42 TABLET | Refills: 1 | Status: SHIPPED | OUTPATIENT
Start: 2023-12-19 | End: 2023-12-19

## 2023-12-19 RX ORDER — PREDNISONE 5 MG/1
TABLET ORAL
Qty: 42 TABLET | Refills: 1 | Status: SHIPPED | OUTPATIENT
Start: 2023-12-19 | End: 2024-01-26

## 2023-12-19 NOTE — TELEPHONE ENCOUNTER
"Patient calling with \"excruciating pain in the feet\".  C/o difficulty walking. Feet are swollen.  Pain is sharp to aching.  Denies any redness or warmth to the feet.  Previous taper given at the end of November (20mg x7d, 15mg x7d) worked \"pretty good\", but patient requesting a longer taper.   Uses [facility] in [place].     Arina Allison RN    "

## 2024-01-08 ENCOUNTER — DOCUMENTATION ONLY (OUTPATIENT)
Dept: ANTICOAGULATION | Facility: CLINIC | Age: 64
End: 2024-01-08
Payer: COMMERCIAL

## 2024-01-08 NOTE — PROGRESS NOTES
Anticoagulation Clinic Notification    Shira, is past due for an INR. Their last result was 3.3 on 11/2/23 and was due to come back on 11/16/23.    she received phone calls and letters over the last several weeks in attempt to arrange follow up labs. Shira M Michael will be contacted again today.     Please contact patient directly to discuss compliance with monitoring or schedule visit to review ongoing anticoagulation therapy.    Thank you,     [facility]

## 2024-01-08 NOTE — LETTER
[facility]  [address] [address]  Phone: [phone]  Fax: [phone]   January 8, 2024        Shira Rodriguez  [address]   [place] [address]            Dear Shira,    You are currently under the care of [facility] for your warfarin (Coumadin , Jantoven ) therapy.  We are contacting you because our records show you were due for an INR on 11/16/23.    There are potentially serious risks when taking warfarin without careful monitoring and we want to make sure you are safely managed.  Routine lab monitoring is required for warfarin refills.     Please call [phone]  as soon as possible to schedule a lab appointment. If it is difficult for you to get to lab, please call us to discuss options.  If there has been a change in your care or other concerns, please let us know so we can help and/or update our records.         Sincerely,       [facility]

## 2024-01-10 DIAGNOSIS — I26.99 PULMONARY EMBOLI (H): Primary | ICD-10-CM

## 2024-01-12 ENCOUNTER — ANTICOAGULATION THERAPY VISIT (OUTPATIENT)
Dept: ANTICOAGULATION | Facility: CLINIC | Age: 64
End: 2024-01-12

## 2024-01-12 ENCOUNTER — LAB (OUTPATIENT)
Dept: LAB | Facility: CLINIC | Age: 64
End: 2024-01-12
Payer: COMMERCIAL

## 2024-01-12 DIAGNOSIS — Z79.01 LONG TERM CURRENT USE OF ANTICOAGULANT THERAPY: ICD-10-CM

## 2024-01-12 DIAGNOSIS — I26.99 PULMONARY EMBOLI (H): Primary | ICD-10-CM

## 2024-01-12 DIAGNOSIS — I26.99 PULMONARY EMBOLI (H): ICD-10-CM

## 2024-01-12 LAB — INR BLD: 2.4 (ref 0.9–1.1)

## 2024-01-12 PROCEDURE — 85610 PROTHROMBIN TIME: CPT

## 2024-01-12 PROCEDURE — 36416 COLLJ CAPILLARY BLOOD SPEC: CPT

## 2024-01-12 NOTE — PROGRESS NOTES
ANTICOAGULATION MANAGEMENT     Shira Rodriguez 63 year old female is on warfarin with therapeutic INR result. (Goal INR 2.0-3.0)    Recent labs: (last 7 days)     01/12/24  1432   INR 2.4*       ASSESSMENT     Source(s): Chart Review  Previous INR was Supratherapeutic  Medication, diet, health changes since last INR chart reviewed; none identified         PLAN     Recommended plan for no diet, medication or health factor changes affecting INR     Dosing Instructions: Continue your current warfarin dose with next INR in 3 weeks       Summary  As of 1/12/2024      Full warfarin instructions:  7.5 mg every Mon, Thu, Sat; 10 mg all other days   Next INR check:  2/2/2024               Detailed voice message left for Shira with dosing instructions and follow up date.   Sent [employer] message with dosing and follow up instructions    Contact [phone] to schedule and with any changes, questions or concerns.     Education provided:   Please call back if any changes to your diet, medications or how you've been taking warfarin  Goal range and lab monitoring: goal range and significance of current result and Importance of following up at instructed interval  Contact [phone] with any changes, questions or concerns.     Plan made per [facility] anticoagulation protocol    YUNIER DAVIDSON RN  Anticoagulation Clinic  1/12/2024    _______________________________________________________________________     Anticoagulation Episode Summary       Current INR goal:  2.0-3.0   TTR:  51.2% (11.3 mo)   Target end date:  Indefinite   Send INR reminders to:  [facility]    Indications    Pulmonary emboli (H) [I26.99]  Long term current use of anticoagulant therapy [Z79.01]             Comments:               Anticoagulation Care Providers       Provider Role Specialty Phone number    Anjel Busby MD Referring Family Medicine [phone]

## 2024-01-26 ENCOUNTER — LAB (OUTPATIENT)
Dept: LAB | Facility: CLINIC | Age: 64
End: 2024-01-26
Attending: INTERNAL MEDICINE
Payer: COMMERCIAL

## 2024-01-26 ENCOUNTER — OFFICE VISIT (OUTPATIENT)
Dept: RHEUMATOLOGY | Facility: CLINIC | Age: 64
End: 2024-01-26
Attending: INTERNAL MEDICINE
Payer: COMMERCIAL

## 2024-01-26 ENCOUNTER — ANTICOAGULATION THERAPY VISIT (OUTPATIENT)
Dept: ANTICOAGULATION | Facility: CLINIC | Age: 64
End: 2024-01-26

## 2024-01-26 VITALS — HEART RATE: 93 BPM | DIASTOLIC BLOOD PRESSURE: 63 MMHG | SYSTOLIC BLOOD PRESSURE: 93 MMHG

## 2024-01-26 DIAGNOSIS — M06.00 RHEUMATOID ARTHRITIS WITH NEGATIVE RHEUMATOID FACTOR, INVOLVING UNSPECIFIED SITE (H): ICD-10-CM

## 2024-01-26 DIAGNOSIS — I26.99 PULMONARY EMBOLI (H): Primary | ICD-10-CM

## 2024-01-26 DIAGNOSIS — Z11.59 ENCOUNTER FOR SCREENING FOR OTHER VIRAL DISEASES: ICD-10-CM

## 2024-01-26 DIAGNOSIS — I26.99 PULMONARY EMBOLI (H): ICD-10-CM

## 2024-01-26 DIAGNOSIS — M06.00 RHEUMATOID ARTHRITIS WITH NEGATIVE RHEUMATOID FACTOR, INVOLVING UNSPECIFIED SITE (H): Primary | ICD-10-CM

## 2024-01-26 DIAGNOSIS — Z79.01 LONG TERM CURRENT USE OF ANTICOAGULANT THERAPY: ICD-10-CM

## 2024-01-26 LAB
ALBUMIN SERPL BCG-MCNC: 3 G/DL (ref 3.5–5.2)
ALP SERPL-CCNC: 51 U/L (ref 40–150)
ALT SERPL W P-5'-P-CCNC: 9 U/L (ref 0–50)
ANION GAP SERPL CALCULATED.3IONS-SCNC: 10 MMOL/L (ref 7–15)
AST SERPL W P-5'-P-CCNC: 14 U/L (ref 0–45)
BASOPHILS # BLD AUTO: 0 10E3/UL (ref 0–0.2)
BASOPHILS NFR BLD AUTO: 0 %
BILIRUB SERPL-MCNC: 0.2 MG/DL
BUN SERPL-MCNC: 26.8 MG/DL (ref 8–23)
CALCIUM SERPL-MCNC: 10.5 MG/DL (ref 8.8–10.2)
CHLORIDE SERPL-SCNC: 103 MMOL/L (ref 98–107)
CREAT SERPL-MCNC: 1.91 MG/DL (ref 0.51–0.95)
CRP SERPL-MCNC: 93.5 MG/L
DEPRECATED HCO3 PLAS-SCNC: 26 MMOL/L (ref 22–29)
EGFRCR SERPLBLD CKD-EPI 2021: 29 ML/MIN/1.73M2
EOSINOPHIL # BLD AUTO: 0.1 10E3/UL (ref 0–0.7)
EOSINOPHIL NFR BLD AUTO: 1 %
ERYTHROCYTE [DISTWIDTH] IN BLOOD BY AUTOMATED COUNT: 13.4 % (ref 10–15)
ERYTHROCYTE [SEDIMENTATION RATE] IN BLOOD BY WESTERGREN METHOD: >140 MM/HR (ref 0–30)
GLUCOSE SERPL-MCNC: 108 MG/DL (ref 70–99)
HBV CORE AB SERPL QL IA: NONREACTIVE
HBV SURFACE AG SERPL QL IA: NONREACTIVE
HCT VFR BLD AUTO: 31 % (ref 35–47)
HCV AB SERPL QL IA: NONREACTIVE
HGB BLD-MCNC: 9.7 G/DL (ref 11.7–15.7)
IMM GRANULOCYTES # BLD: 0 10E3/UL
IMM GRANULOCYTES NFR BLD: 0 %
INR BLD: 5 (ref 0.9–1.1)
LYMPHOCYTES # BLD AUTO: 1 10E3/UL (ref 0.8–5.3)
LYMPHOCYTES NFR BLD AUTO: 12 %
MCH RBC QN AUTO: 29 PG (ref 26.5–33)
MCHC RBC AUTO-ENTMCNC: 31.3 G/DL (ref 31.5–36.5)
MCV RBC AUTO: 93 FL (ref 78–100)
MONOCYTES # BLD AUTO: 0.5 10E3/UL (ref 0–1.3)
MONOCYTES NFR BLD AUTO: 6 %
NEUTROPHILS # BLD AUTO: 6.3 10E3/UL (ref 1.6–8.3)
NEUTROPHILS NFR BLD AUTO: 81 %
NRBC # BLD AUTO: 0 10E3/UL
NRBC BLD AUTO-RTO: 0 /100
PLATELET # BLD AUTO: 427 10E3/UL (ref 150–450)
POTASSIUM SERPL-SCNC: 4.3 MMOL/L (ref 3.4–5.3)
PROT SERPL-MCNC: 7.5 G/DL (ref 6.4–8.3)
RBC # BLD AUTO: 3.34 10E6/UL (ref 3.8–5.2)
SODIUM SERPL-SCNC: 139 MMOL/L (ref 135–145)
WBC # BLD AUTO: 7.9 10E3/UL (ref 4–11)

## 2024-01-26 PROCEDURE — 87340 HEPATITIS B SURFACE AG IA: CPT | Performed by: INTERNAL MEDICINE

## 2024-01-26 PROCEDURE — 80053 COMPREHEN METABOLIC PANEL: CPT | Performed by: PATHOLOGY

## 2024-01-26 PROCEDURE — 86704 HEP B CORE ANTIBODY TOTAL: CPT | Performed by: INTERNAL MEDICINE

## 2024-01-26 PROCEDURE — 85610 PROTHROMBIN TIME: CPT | Performed by: PATHOLOGY

## 2024-01-26 PROCEDURE — 36415 COLL VENOUS BLD VENIPUNCTURE: CPT | Performed by: PATHOLOGY

## 2024-01-26 PROCEDURE — 86481 TB AG RESPONSE T-CELL SUSP: CPT | Performed by: INTERNAL MEDICINE

## 2024-01-26 PROCEDURE — 86803 HEPATITIS C AB TEST: CPT | Performed by: INTERNAL MEDICINE

## 2024-01-26 PROCEDURE — G0463 HOSPITAL OUTPT CLINIC VISIT: HCPCS | Performed by: INTERNAL MEDICINE

## 2024-01-26 PROCEDURE — 86140 C-REACTIVE PROTEIN: CPT | Performed by: PATHOLOGY

## 2024-01-26 PROCEDURE — 85025 COMPLETE CBC W/AUTO DIFF WBC: CPT | Performed by: PATHOLOGY

## 2024-01-26 PROCEDURE — 85652 RBC SED RATE AUTOMATED: CPT | Performed by: PATHOLOGY

## 2024-01-26 PROCEDURE — 99215 OFFICE O/P EST HI 40 MIN: CPT | Performed by: INTERNAL MEDICINE

## 2024-01-26 PROCEDURE — 99000 SPECIMEN HANDLING OFFICE-LAB: CPT | Performed by: PATHOLOGY

## 2024-01-26 RX ORDER — METHYLPREDNISOLONE 4 MG/1
4 TABLET ORAL DAILY
Qty: 80 TABLET | Refills: 1 | Status: SHIPPED | OUTPATIENT
Start: 2024-01-26 | End: 2024-03-08

## 2024-01-26 ASSESSMENT — PAIN SCALES - GENERAL: PAINLEVEL: MODERATE PAIN (4)

## 2024-01-26 NOTE — PROGRESS NOTES
Rheumatology Clinic  Everett Austin MD  2024     Name: Shira Rodriguez  MRN: [id number]  Age: 63 year old  : 1960  Referring provider: Anjel Busby     Assessment and Plan:  # Inflammatory Arthritis (+RF/+CCP, dx 2017 with diffuse polyarticular inflammatory flare):   Ms. Rodriguez reports severe resurgence of multiple joint pain, primarily affecting both hands and fingers, as well as the feet, wrists, elbows. Morning stiffness has increased in duration.  Physical exam shows gross synovitis at the wrists, MCP, MTP.  Hands show diminished fist formation and  strength.  There is thenar eminence wasting right greater than left.  Tenderness at multiple MCPs, thickening at MCP 2 and 3 on the right.    Data: In 2023, creatinine was 1.69; CRP was elevated at 21; CBC showed slight leukocytosis.  In 2022 rheumatoid factor was greater than 200 international units, increased in titer significantly since last measurement in 2017.  Cyclic citrullinated peptide antibodies were present at 11 units, not significantly different from the low titer observed in 2017.  Imaging: Foot x-rays 2023 showed degenerative arthritis right second MTP heterotopic bone over the distal Achilles on the right, and forefoot soft tissue swelling.    Discussion: Bilateral knuckle and finger pain, prolonged morning stiffness, bilateral midfoot pain, along with signs of synovitis on exam, are compatible with flaring inflammatory arthritis. Shoulder and knee predominant pain present mechanical, degenerative joint disease generated pain, and would not be expected to respond to immunomodulatory therapy.    Patient formerly had excellent control of symptoms with methotrexate monotherapy. Unfortunately, methotrexate use in - was associated with severe myelosuppressive effects and marked mucositis, likely contributing to recent prolonged hospitalization for sepsis in .  I recommend that  methotrexate be avoided indefinitely. I now recommend a trial of José Miguel kinase inhibition with upadacitinib 15 mg daily.  I expect benefit within 2 to 6 weeks.  I recommend continuing hydroxychloroquine while starting upadacitinib.  Short-term relief of inflammatory joint pain can be addressed with methylprednisolone 20 mg daily for a week followed by taper.    If alternative immunomodulatory therapy is warranted for recurrent inflammatory joint symptoms, I will recommend use of targeted therapy with no myelosuppressive potential, including for example abatacept.    #Shoulder osteoarthritis: Xrays showed joint space narrowing of glenohumeral joints and subluxation with inferior migration of the humeral heads.  We discussed my impression that shoulder pain does not reflect active inflammatory arthritis, and that immunomodulatory medication would not likely improve pain in the shoulders.  I recommend a symptomatic local treatment approach, including physical therapy, to be coordinated through sports/orthopedics    # Severe obesity  # CKD3  # Right knee osteoarthritis: Knee injected in October 2023 by orthopedics.  # Bilateral shoulder pain: Did not discuss today. X-rays suggested presence of advanced degenerative arthritis.  I recommend follow-up with sports medicine/orthopedics, physical therapy.     Plan:  1.  Medrol 5 tablets daily for 1 week, then 4 tablets daily for 1 week, then 3 tablets daily for 1 week, then off  2.  Continue hydroxychloroquine 200 mg 1 tablet twice daily.  3. Start rinvoq 15 mg daily after blood work is completed and pharmacy contacts you.  4.  Utilize acetaminophen 1000 mg 3 times daily for pain.  5. Alert rheumatology if pain control not signficantly better after medrol over weekend    RTC 6 mos    Everett Austin M.D.  Staff Rheumatologist,  Health  Pager [phone]    On the day of the encounter, a total of 50 minutes was spent in chart review, and in counseling and coordination of care,  regarding the patient's complex medical problem of severe rheumatoid arthritis.    HPI:   Shira Rodriguez has a history of rheumatoid arthritis who presents for follow up. I last saw the patient 10-23. Seropositive rheumatoid arthritis was active after discontinuing methotrexate in the spring 2022 due to cytopenias and to mucositis.  Recommendation was to start hydroxychloroquine.    Rheumatological history:  Referrred 2/2017 for 3 months of BL hand pain thought consistent with CTS and positive RF/CCP. EMG showed moderate median neuropathy on L and severe on R, MRI c-spine normal. She received R CT injection by sports [employer] with 9 months of relief. She did describe intermittent episodes of swelling of her feet/ankles around this time period as well that resolved with medrol dose pack. She was re-evaluated 8/2017 after developing polyarticular inflammatory arthritis of the right knee, ankles, wrists, hands, and shoulders. She was started on MTX, given depomedrol IM injection, oral prednisone, and given R knee CS injection. MTX increased and oral prednisone tapered to off in 9/2017. Had repeat R CT injection in 10/2017 that lasted until 1/2018. R knee significantly improved following CS injection 12/2017. Has persistent pain in L heel with enthesopathy on imaging, unclear if had true enthesitis in this region due to her RA. Participated in pool therapy winter 2018 with good response. At her 3/2018 OV she continued to have significant pain in her R wrist thought due to CT, but given some ongoing persistent EMS (BL hand stiffness lasting ALL day) as well and her known seropositive disease I recommended adding Humira to her regimen. This was ordered but she never started. Methotrexate Rx continued through 2021, drug stopped as of early 2022. Methotrexate restarted in fall 2022, stopped in January 2023, when patient was admitted to hospital with mucositis and severe cytopenias, severe B12 and folate deficiencies.  "Hydroxychloroquine started in 10-23, no benefit as of 1-2024; upatacitinib trial 2-2024.    Interval history January 26, 2024    She has marked joint pain every day. Worst in the morning. Early morning stiffness and pain \"all day\"   Pain in ankles and feet sever with \"first few steps\" as she walks.  Hands hurt such that she cannot pull up a blanket, cook.  R knee hurst much more, no injections X 5 months.  She used prednisone for a flare, but it did not really help with the joint inflammation 3 weeks ago.  Hydroxychloroquine has been used since 10-23; she reports good tolerability, but no benefit.    Interval history October 26, 2023    Today, she reports R foot pain for several weeks. No swelling; pain worse with wt bearing, but constant, no help with tyllenol. No injury. Wrists and shoulders hurt with pressure/wt bearing.    Both hands are painful, stiff, and have been for many weeks. No help with OTC acetaminophen.  R hand goes numb when she eats.    Early morning stiffness lasts hours.    She has continued to recover from critical illness in 2-2023. She can independently feed/dress self.    Rknee pain improved after knee injection in 3-23, but sx recurred. Orthopedics injected again in 10-23. L shoulder also injected.  She has not used oral prednisone for months.      Interval history March 16, 2023    Reports that after the last visit, she misunderstood the therapeutic plan, which involved continued withholding of methotrexate and watchful waiting.  She reports that she continues methotrexate weekly at 20 mg (8 tablets) as directed by prescription.  She used methotrexate right up until hospitalization in January    Patient was admitted to [facility] on January 28, 2023, discharged on February 21, 2023.  Discharge diagnoses include severe sepsis, resolved, acute kidney injury on CKD stage III, resolved.  Upon admission, she had severe neutropenia and pancytopenia, associated with methotrexate " use prior to admission.  She was treated with rehydration and empiric antibiotics as well as Neupogen.  Concern about methotrexate use as well as severe B12 and folate deficiency was raised as a cause of neutropenia.  Bone marrow biopsy was negative for malignancy or myelodysplastic disorder.    Today, she reports that she completed a 1 week stay in the transitional care facility, and was discharged 1 week ago.  Since then, she reports joint pain, primarily in both shoulders and the right knee.  Right knee is particularly painful with weightbearing, but also provides night pain.  She has not noticed swelling or warmth. Last injection 6 months ago gave her 3 months of relief.    Shoulders are painful with raising the arms, getting dressed, lifting and reaching.  Pain is much less when shoulders are at rest.  Small joints of the hands and feet have not been painful.  She denies prolonged morning stiffness.      Interval history September 29, 2022    She stopped methotrexate 6 months ago because she could not get syringes.  She reports the happy circumstance that former wrist hand and foot swelling stiffness and pain has not recurred despite being off the methotrexate for 6 months.  Morning stiffness is less than 10 minutes.  She has knee pain that is made worse with walking on uneven ground or when navigating stairs.  But no pain or swelling visible in knees or other joints.  She has not required prednisone or regular use of nonsteroidals since last visit.    Interval history 05-:    Overall joints are doing well.  She has some soreness with flexion of the second and third DIPs in the right hand.  Otherwise there is no swelling, stiffness, redness, or restriction in motion of hand and finger joints.  Knees bother her with prolonged walking, but she looks forward to returning to the pool after the pandemic for aqua exercise.  She believes that the methotrexate has been associated with greater than 80%  improvement in joint pain and other symptoms.    Unrelenting fatigue continues.  She notes a new source of exhaustion and that her mother recently had a stroke and the patient is is sharing personal care duties with a sister.    She is remained on methotrexate through the COVID-19 pandemic.  No further oral ulcerations.  She has not been using leucovorin.  She has used folate 3 mg daily.    Prior history  2-2020    Today, the patient reports that she has been doing well since her last visit. She states that her oral lesions resolved with  leucovorin and folic acid. She also notes that her joint pain and decreased range of motion is limited to her bilateral second digits in her hands. Her left second finger is more stiff than her right, and she denies locking or ratcheting in either finger. She notes that her morning stiffness typically resolves in 15 to 30 minutes. She has no significant swelling in her feet. She notes that she has very little swelling in her legs after starting lasix. She denies rash, shortness of breath, or raynaud's flares.     The patient states that she had a cough prior to her pulmonary embolism/myocardial infarction. She then developed pneumonia while intubated and required a thoracentesis while in the hospital. She reports that she had a persistent cough after this procedure and repeat ultrasound noted further fluid for which she had another thoracentesis which produced 600 ml of fluid. She notes that her cough is improved after her second thoracentesis but is still present. She states her pulmonologist is concerned her remaining cough could be related to her Methotrexate. She is set up for a repeat PFTs and Chest Xray in the coming months.           Review of Systems:   Pertinent items are noted in HPI or as below, remainder of complete ROS is negative.    No fevers/chills/sweats  No recent problems with hearing or vision. No swallowing problems.   No breathing difficulty, shortness of  breath, coughing, or wheezing  No chest pain or palpitations  No heart burn, indigestion, abdominal pain, nausea, vomiting, diarrhea  No urination problems, no bloody, cloudy urine, no dysuria  No numbing, tingling, weakness  No headaches or confusion  No rashes. No easy bleeding or bruising.       Active Medications:   Current Outpatient Medications   Medication Sig    acetaminophen (TYLENOL) 325 MG tablet Take 2 tablets (650 mg) by mouth every 6 hours as needed for mild pain or fever    ARIPiprazole (ABILIFY) 5 MG tablet Take 1 tablet by mouth daily at 2 pm    capsaicin (ZOSTRIX) 0.025 % external cream Apply sparingly to sore joints twice daily for pain    cyanocobalamin (VITAMIN B-12) 500 MCG SUBL sublingual tablet Place 2 tablets (1,000 mcg) under the tongue daily    DULoxetine (CYMBALTA) 60 MG capsule Take 1 capsule (60 mg) by mouth daily (Patient taking differently: Take 120 mg by mouth daily)    ferrous sulfate (FEROSUL) 325 (65 Fe) MG tablet Take 1 tablet (325 mg) by mouth daily . Take with citrus.    folic acid (FOLVITE) 1 MG tablet Take 2 tablets (2 mg) by mouth daily    hydroxychloroquine (PLAQUENIL) 200 MG tablet Take 1 tablet (200 mg) by mouth 2 times daily    lisinopril (ZESTRIL) 5 MG tablet Take 1 tablet (5 mg) by mouth daily    methylPREDNISolone (MEDROL) 4 MG tablet Take 1 tablet (4 mg) by mouth daily Take 5 tabs daily for 1 week, then take 4 tabs daily for 1 week, then 3 tabs daily for 1 week.    multivitamin w/minerals (THERA-VIT-M) tablet Take 1 tablet by mouth daily    nystatin (MYCOSTATIN) 938360 UNIT/GM external powder Apply topically 4 times daily as needed    senna-docusate (SENOKOT-S/PERICOLACE) 8.6-50 MG tablet Take 1 tablet by mouth 2 times daily as needed for constipation    VITAMIN D3 25 MCG (1000 UT) tablet TAKE 2 TABLETS BY MOUTH EVERY DAY    warfarin ANTICOAGULANT (COUMADIN) 5 MG tablet Take 1.5 to 2 tablets by mouth every day OR as directed by Anticoagulation Clinic-adjusted per INR  results    Warfarin Therapy Reminder Take 1 each by mouth See Admin Instructions    albuterol (PROAIR HFA/PROVENTIL HFA/VENTOLIN HFA) 108 (90 Base) MCG/ACT inhaler Inhale 2 puffs into the lungs every 6 hours as needed for shortness of breath / dyspnea or wheezing (Patient not taking: Reported on 5/24/2023)    atorvastatin (LIPITOR) 40 MG tablet Take 1 tablet (40 mg) by mouth every evening (Patient not taking: Reported on 5/24/2023)    melatonin 3 MG tablet Take 1 tablet (3 mg) by mouth nightly as needed for sleep (Patient not taking: Reported on 10/26/2023)    predniSONE (DELTASONE) 5 MG tablet Take 3 tabs daily for 1 week, then take 2 tabs daily for 1 week, then take 1 tab daily for 1 week     Current Facility-Administered Medications   Medication    lidocaine 1 % injection 3 mL    lidocaine 1 % injection 4 mL    lidocaine 1 % injection 4 mL    methylPREDNISolone (DEPO-MEDROL) injection 40 mg    triamcinolone (KENALOG-40) injection 40 mg     Facility-Administered Medications Ordered in Other Visits   Medication    sodium chloride (PF) 0.9% PF flush 10 mL     Stopped lipitor       Allergies:   Adhesive tape and Erythromycin      Past Medical History:  Depression   Eczema   Hyperlipidemia   Hypertension   Morbid obesity   Osteoarthrosis right knee   Sleep apnea   Tobacco use disorder   Primary localized osteoarthrosis, pelvic region and thigh  Degeneration of cervical intervertebral disc  Chronic bronchitis   Pulmonary embolism   Cardiac arrest; required ECMO 9-2019     Past Surgical History:  Left total knee arthoplasty 6/14/2012   Right hip arthroplasty 07/09/2004      Family History:   Mother: thyroid disease, hypertension, skin cancer, CVA  Paternal grandmother: breast cancer, pancreatic cancer   Father: alcoholism   Sister: thyroid disease, alcoholism   Maternal grandmother: hypertension   Sister: heart disease, multiple ablations   Paternal grandfather: prostate cancer      Social History:   Current everyday  cigarette smoker, 1 pack/day, 33 years, started 1982  No smokeless tobacco use  Occasional alcohol use   Physical Exam:   BP 93/63   Pulse 93    Wt Readings from Last 4 Encounters:   10/26/23 116.6 kg (257 lb)   09/07/23 121.1 kg (267 lb)   06/22/23 116.1 kg (256 lb)   05/24/23 120.5 kg (265 lb 11.2 oz)     Constitutional: Well-developed, appearing stated age; cooperative, severe obese  Eyes: Normal EOM, PERRLA, vision, conjunctiva, sclera  ENT: Normal external ears, nose, hearing, lips, teeth, gums, throat. No mucous membrane lesions, normal saliva pool  Neck: No mass or thyroid enlargement  MS: Reduced fist formation; +synovitis at MCPs, MTPs, wrists.  Shoulders with painful flexion beyond 60 degrees.  Tenderness at left anterior shoulder.  Impingement, left greater than right.  Skin: No nail pitting, alopecia, nodules or lesions.    Neuro: Normal cranial nerves  Psych: Normal judgement, orientation, memory, affect.     Laboratory:       Latest Ref Rng & Units 3/31/2023     2:39 PM 5/8/2023     3:03 PM 11/2/2023     2:55 PM   RHEUM RESULTS   Albumin 3.5 - 5.2 g/dL 3.8   3.5    ALT 0 - 50 U/L 9   26    AST 0 - 45 U/L 15   22    Creatinine 0.51 - 0.95 mg/dL 1.72   1.69    CRP Inflammation <5.00 mg/L   21.40    GFR Estimate >60 mL/min/1.73m2 33   34    Hematocrit 35.0 - 47.0 % 32.9  35.2  39.9    Hemoglobin 11.7 - 15.7 g/dL 10.5  11.5  12.6    WBC 4.0 - 11.0 10e3/uL 10.5  6.1  12.0    RBC Count 3.80 - 5.20 10e6/uL 3.51  3.90  4.32    RDW 10.0 - 15.0 % 14.8  13.3  14.7    MCHC 31.5 - 36.5 g/dL 31.9  32.7  31.6    MCV 78 - 100 fL 94  90  92    Platelet Count 150 - 450 10e3/uL 302  321  392        Rheumatoid Factor   Date Value Ref Range Status   08/28/2017 51 (H) <20 IU/mL Final     Cyclic Citrullinated Peptide Antibody, IgG   Date Value Ref Range Status   08/28/2017 15 (H) <7 U/mL Final     Comment:     Positive     EMERY Screen by EIA   Date Value Ref Range Status   12/30/2016 <1.0  Interpretation:  Negative   <1.0  Final     Hepatitis B Core Caron   Date Value Ref Range Status   04/12/2017 Nonreactive NR Final     Hep B Surface Agn   Date Value Ref Range Status   04/12/2017 Nonreactive NR Final     IGG   Date Value Ref Range Status   04/21/2017 940 695 - 1,620 mg/dL Final     IGA   Date Value Ref Range Status   04/21/2017 271 70 - 380 mg/dL Final     IGM   Date Value Ref Range Status   04/21/2017 395 (H) 60 - 265 mg/dL Final     Chief Complaint   Patient presents with    Follow Up     Blood pressure 93/63, pulse 93, not currently breastfeeding.

## 2024-01-26 NOTE — PATIENT INSTRUCTIONS
Diagnosis:  1.  Flaring inflammatory/rheumatoid) arthritis. Response to hydroxychloroquine therapy, given since fall 2023, has been inadequate. I recommend starting a 3 week course of medrol and starting immune modulating medicine with rinvoq.    Plan:  1.  Medrol 5 tablets daily for 1 week, then 4 tablets daily for 1 week, then 3 tablets daily for 1 week, then off  2.  Continue hydroxychloroquine 200 mg 1 tablet twice daily.   3. Start rinvoq 15 mg daily after blood work is completed and pharmacy contacts you.  4.  Utilize acetaminophen 1000 mg 3 times daily for pain.  5. Alert rheumatology if pain control not signficantly better after medrol over weekend

## 2024-01-26 NOTE — PROGRESS NOTES
ANTICOAGULATION MANAGEMENT     Shira Rodriguez 63 year old female is on warfarin with supratherapeutic INR result. (Goal INR 2.0-3.0)    Recent labs: (last 7 days)     01/26/24  1600   INR 5.0*       ASSESSMENT     Source(s): Chart Review  Previous INR was Therapeutic last visit; previously outside of goal range  Medication, diet, health changes since last INR - prednisone taper was prescribed today for three weeks for RA. Per [employer], Concurrent use of METHYLPREDNISOLONE and WARFARIN may result in increased risk of bleeding or diminished effects of warfarin.      FYI for PCP-  [facility] is required to notify PCP when INR is >5.0. I was not able to get a hold of pt today, but did advise a hold tonight (or tomorrow if she already took it this AM), then resume dose, but would really like a call back before the end of the clinic day.        PLAN     Unable to reach pt today.    Left message to hold tonight (or tomorrow if she already took dose this AM), then resume usual dose over the weekend  this weekend. Request call back for assessment. Pt should have INR check on Monday 1/29     Follow up required to confirm warfarin dose taken and assess for changes    Vickie Gabriel RN  Anticoagulation Clinic  1/26/2024

## 2024-01-26 NOTE — NURSING NOTE
Chief Complaint   Patient presents with    Follow Up     BP 93/63   Pulse 93   Linda Turpin CMA on 1/26/2024 at 2:29 PM

## 2024-01-26 NOTE — LETTER
2024       RE: Shira Rodriguez  [address]  [place] [address]     Dear Colleague,    Thank you for referring your patient, Shira Rodriguez, to the [facility] RHEUMATOLOGY [facility]INIC [place] at [facility]. Please see a copy of my visit note below.      Rheumatology Clinic  Everett Austin MD  2024     Name: Shira Rodriguez  MRN: [id number]  Age: 63 year old  : 1960  Referring provider: Anjel Busby     Assessment and Plan:  # Inflammatory Arthritis (+RF/+CCP, dx 2017 with diffuse polyarticular inflammatory flare):   Ms. Rodriguez reports severe resurgence of multiple joint pain, primarily affecting both hands and fingers, as well as the feet, wrists, elbows. Morning stiffness has increased in duration.  Physical exam shows gross synovitis at the wrists, MCP, MTP.  Hands show diminished fist formation and  strength.  There is thenar eminence wasting right greater than left.  Tenderness at multiple MCPs, thickening at MCP 2 and 3 on the right.    Data: In 2023, creatinine was 1.69; CRP was elevated at 21; CBC showed slight leukocytosis.  In 2022 rheumatoid factor was greater than 200 international units, increased in titer significantly since last measurement in 2017.  Cyclic citrullinated peptide antibodies were present at 11 units, not significantly different from the low titer observed in 2017.  Imaging: Foot x-rays 2023 showed degenerative arthritis right second MTP heterotopic bone over the distal Achilles on the right, and forefoot soft tissue swelling.    Discussion: Bilateral knuckle and finger pain, prolonged morning stiffness, bilateral midfoot pain, along with signs of synovitis on exam, are compatible with flaring inflammatory arthritis. Shoulder and knee predominant pain present mechanical, degenerative joint disease generated pain, and would not be expected to  respond to immunomodulatory therapy.    Patient formerly had excellent control of symptoms with methotrexate monotherapy. Unfortunately, methotrexate use in 2022-23 was associated with severe myelosuppressive effects and marked mucositis, likely contributing to recent prolonged hospitalization for sepsis in 2023.  I recommend that methotrexate be avoided indefinitely. I now recommend a trial of José Miguel kinase inhibition with upadacitinib 15 mg daily.  I expect benefit within 2 to 6 weeks.  I recommend continuing hydroxychloroquine while starting upadacitinib.  Short-term relief of inflammatory joint pain can be addressed with methylprednisolone 20 mg daily for a week followed by taper.    If alternative immunomodulatory therapy is warranted for recurrent inflammatory joint symptoms, I will recommend use of targeted therapy with no myelosuppressive potential, including for example abatacept.    #Shoulder osteoarthritis: Xrays showed joint space narrowing of glenohumeral joints and subluxation with inferior migration of the humeral heads.  We discussed my impression that shoulder pain does not reflect active inflammatory arthritis, and that immunomodulatory medication would not likely improve pain in the shoulders.  I recommend a symptomatic local treatment approach, including physical therapy, to be coordinated through sports/orthopedics    # Severe obesity  # CKD3  # Right knee osteoarthritis: Knee injected in October 2023 by orthopedics.  # Bilateral shoulder pain: Did not discuss today. X-rays suggested presence of advanced degenerative arthritis.  I recommend follow-up with sports medicine/orthopedics, physical therapy.     Plan:  1.  Medrol 5 tablets daily for 1 week, then 4 tablets daily for 1 week, then 3 tablets daily for 1 week, then off  2.  Continue hydroxychloroquine 200 mg 1 tablet twice daily.  3. Start rinvoq 15 mg daily after blood work is completed and pharmacy contacts you.  4.  Utilize acetaminophen  1000 mg 3 times daily for pain.  5. Alert rheumatology if pain control not signficantly better after medrol over weekend    RTC 6 mos    Everett Austin M.D.  Staff Rheumatologist, [facility]  Pager [phone]    On the day of the encounter, a total of 50 minutes was spent in chart review, and in counseling and coordination of care, regarding the patient's complex medical problem of severe rheumatoid arthritis.    HPI:   Shira Rodriguez has a history of rheumatoid arthritis who presents for follow up. I last saw the patient 10-23. Seropositive rheumatoid arthritis was active after discontinuing methotrexate in the spring 2022 due to cytopenias and to mucositis.  Recommendation was to start hydroxychloroquine.    Rheumatological history:  Referrred 2/2017 for 3 months of BL hand pain thought consistent with CTS and positive RF/CCP. EMG showed moderate median neuropathy on L and severe on R, MRI c-spine normal. She received R CT injection by [employer] with 9 months of relief. She did describe intermittent episodes of swelling of her feet/ankles around this time period as well that resolved with medrol dose pack. She was re-evaluated 8/2017 after developing polyarticular inflammatory arthritis of the right knee, ankles, wrists, hands, and shoulders. She was started on MTX, given depomedrol IM injection, oral prednisone, and given R knee CS injection. MTX increased and oral prednisone tapered to off in 9/2017. Had repeat R CT injection in 10/2017 that lasted until 1/2018. R knee significantly improved following CS injection 12/2017. Has persistent pain in L heel with enthesopathy on imaging, unclear if had true enthesitis in this region due to her RA. Participated in pool therapy winter 2018 with good response. At her 3/2018 OV she continued to have significant pain in her R wrist thought due to CT, but given some ongoing persistent EMS (BL hand stiffness lasting ALL day) as well and her known seropositive disease I  "recommended adding Humira to her regimen. This was ordered but she never started. Methotrexate Rx continued through 2021, drug stopped as of early 2022. Methotrexate restarted in fall 2022, stopped in January 2023, when patient was admitted to hospital with mucositis and severe cytopenias, severe B12 and folate deficiencies. Hydroxychloroquine started in 10-23, no benefit as of 1-2024; upatacitinib trial 2-2024.    Interval history January 26, 2024    She has marked joint pain every day. Worst in the morning. Early morning stiffness and pain \"all day\"   Pain in ankles and feet sever with \"first few steps\" as she walks.  Hands hurt such that she cannot pull up a blanket, cook.  R knee hurst much more, no injections X 5 months.  She used prednisone for a flare, but it did not really help with the joint inflammation 3 weeks ago.  Hydroxychloroquine has been used since 10-23; she reports good tolerability, but no benefit.    Interval history October 26, 2023    Today, she reports R foot pain for several weeks. No swelling; pain worse with wt bearing, but constant, no help with tyllenol. No injury. Wrists and shoulders hurt with pressure/wt bearing.    Both hands are painful, stiff, and have been for many weeks. No help with OTC acetaminophen.  R hand goes numb when she eats.    Early morning stiffness lasts hours.    She has continued to recover from critical illness in 2-2023. She can independently feed/dress self.    Rknee pain improved after knee injection in 3-23, but sx recurred. Orthopedics injected again in 10-23. L shoulder also injected.  She has not used oral prednisone for months.      Interval history March 16, 2023    Reports that after the last visit, she misunderstood the therapeutic plan, which involved continued withholding of methotrexate and watchful waiting.  She reports that she continues methotrexate weekly at 20 mg (8 tablets) as directed by prescription.  She used methotrexate right up until " hospitalization in January    Patient was admitted to [facility] on January 28, 2023, discharged on February 21, 2023.  Discharge diagnoses include severe sepsis, resolved, acute kidney injury on CKD stage III, resolved.  Upon admission, she had severe neutropenia and pancytopenia, associated with methotrexate use prior to admission.  She was treated with rehydration and empiric antibiotics as well as Neupogen.  Concern about methotrexate use as well as severe B12 and folate deficiency was raised as a cause of neutropenia.  Bone marrow biopsy was negative for malignancy or myelodysplastic disorder.    Today, she reports that she completed a 1 week stay in the transitional care facility, and was discharged 1 week ago.  Since then, she reports joint pain, primarily in both shoulders and the right knee.  Right knee is particularly painful with weightbearing, but also provides night pain.  She has not noticed swelling or warmth. Last injection 6 months ago gave her 3 months of relief.    Shoulders are painful with raising the arms, getting dressed, lifting and reaching.  Pain is much less when shoulders are at rest.  Small joints of the hands and feet have not been painful.  She denies prolonged morning stiffness.      Interval history September 29, 2022    She stopped methotrexate 6 months ago because she could not get syringes.  She reports the happy circumstance that former wrist hand and foot swelling stiffness and pain has not recurred despite being off the methotrexate for 6 months.  Morning stiffness is less than 10 minutes.  She has knee pain that is made worse with walking on uneven ground or when navigating stairs.  But no pain or swelling visible in knees or other joints.  She has not required prednisone or regular use of nonsteroidals since last visit.    Interval history 05-:    Overall joints are doing well.  She has some soreness with flexion of the second and third DIPs in the  right hand.  Otherwise there is no swelling, stiffness, redness, or restriction in motion of hand and finger joints.  Knees bother her with prolonged walking, but she looks forward to returning to the pool after the pandemic for aqua exercise.  She believes that the methotrexate has been associated with greater than 80% improvement in joint pain and other symptoms.    Unrelenting fatigue continues.  She notes a new source of exhaustion and that her mother recently had a stroke and the patient is is sharing personal care duties with a sister.    She is remained on methotrexate through the COVID-19 pandemic.  No further oral ulcerations.  She has not been using leucovorin.  She has used folate 3 mg daily.    Prior history  2-2020    Today, the patient reports that she has been doing well since her last visit. She states that her oral lesions resolved with  leucovorin and folic acid. She also notes that her joint pain and decreased range of motion is limited to her bilateral second digits in her hands. Her left second finger is more stiff than her right, and she denies locking or ratcheting in either finger. She notes that her morning stiffness typically resolves in 15 to 30 minutes. She has no significant swelling in her feet. She notes that she has very little swelling in her legs after starting lasix. She denies rash, shortness of breath, or raynaud's flares.     The patient states that she had a cough prior to her pulmonary embolism/myocardial infarction. She then developed pneumonia while intubated and required a thoracentesis while in the hospital. She reports that she had a persistent cough after this procedure and repeat ultrasound noted further fluid for which she had another thoracentesis which produced 600 ml of fluid. She notes that her cough is improved after her second thoracentesis but is still present. She states her pulmonologist is concerned her remaining cough could be related to her Methotrexate.  She is set up for a repeat PFTs and Chest Xray in the coming months.           Review of Systems:   Pertinent items are noted in HPI or as below, remainder of complete ROS is negative.    No fevers/chills/sweats  No recent problems with hearing or vision. No swallowing problems.   No breathing difficulty, shortness of breath, coughing, or wheezing  No chest pain or palpitations  No heart burn, indigestion, abdominal pain, nausea, vomiting, diarrhea  No urination problems, no bloody, cloudy urine, no dysuria  No numbing, tingling, weakness  No headaches or confusion  No rashes. No easy bleeding or bruising.       Active Medications:   Current Outpatient Medications   Medication Sig    acetaminophen (TYLENOL) 325 MG tablet Take 2 tablets (650 mg) by mouth every 6 hours as needed for mild pain or fever    ARIPiprazole (ABILIFY) 5 MG tablet Take 1 tablet by mouth daily at 2 pm    capsaicin (ZOSTRIX) 0.025 % external cream Apply sparingly to sore joints twice daily for pain    cyanocobalamin (VITAMIN B-12) 500 MCG SUBL sublingual tablet Place 2 tablets (1,000 mcg) under the tongue daily    DULoxetine (CYMBALTA) 60 MG capsule Take 1 capsule (60 mg) by mouth daily (Patient taking differently: Take 120 mg by mouth daily)    ferrous sulfate (FEROSUL) 325 (65 Fe) MG tablet Take 1 tablet (325 mg) by mouth daily . Take with citrus.    folic acid (FOLVITE) 1 MG tablet Take 2 tablets (2 mg) by mouth daily    hydroxychloroquine (PLAQUENIL) 200 MG tablet Take 1 tablet (200 mg) by mouth 2 times daily    lisinopril (ZESTRIL) 5 MG tablet Take 1 tablet (5 mg) by mouth daily    methylPREDNISolone (MEDROL) 4 MG tablet Take 1 tablet (4 mg) by mouth daily Take 5 tabs daily for 1 week, then take 4 tabs daily for 1 week, then 3 tabs daily for 1 week.    multivitamin w/minerals (THERA-VIT-M) tablet Take 1 tablet by mouth daily    nystatin (MYCOSTATIN) 269346 UNIT/GM external powder Apply topically 4 times daily as needed    senna-docusate  (SENOKOT-S/PERICOLACE) 8.6-50 MG tablet Take 1 tablet by mouth 2 times daily as needed for constipation    VITAMIN D3 25 MCG (1000 UT) tablet TAKE 2 TABLETS BY MOUTH EVERY DAY    warfarin ANTICOAGULANT (COUMADIN) 5 MG tablet Take 1.5 to 2 tablets by mouth every day OR as directed by Anticoagulation Clinic-adjusted per INR results    Warfarin Therapy Reminder Take 1 each by mouth See Admin Instructions    albuterol (PROAIR HFA/PROVENTIL HFA/VENTOLIN HFA) 108 (90 Base) MCG/ACT inhaler Inhale 2 puffs into the lungs every 6 hours as needed for shortness of breath / dyspnea or wheezing (Patient not taking: Reported on 5/24/2023)    atorvastatin (LIPITOR) 40 MG tablet Take 1 tablet (40 mg) by mouth every evening (Patient not taking: Reported on 5/24/2023)    melatonin 3 MG tablet Take 1 tablet (3 mg) by mouth nightly as needed for sleep (Patient not taking: Reported on 10/26/2023)    predniSONE (DELTASONE) 5 MG tablet Take 3 tabs daily for 1 week, then take 2 tabs daily for 1 week, then take 1 tab daily for 1 week     Current Facility-Administered Medications   Medication    lidocaine 1 % injection 3 mL    lidocaine 1 % injection 4 mL    lidocaine 1 % injection 4 mL    methylPREDNISolone (DEPO-MEDROL) injection 40 mg    triamcinolone (KENALOG-40) injection 40 mg     Facility-Administered Medications Ordered in Other Visits   Medication    sodium chloride (PF) 0.9% PF flush 10 mL     Stopped lipitor       Allergies:   Adhesive tape and Erythromycin      Past Medical History:  Depression   Eczema   Hyperlipidemia   Hypertension   Morbid obesity   Osteoarthrosis right knee   Sleep apnea   Tobacco use disorder   Primary localized osteoarthrosis, pelvic region and thigh  Degeneration of cervical intervertebral disc  Chronic bronchitis   Pulmonary embolism   Cardiac arrest; required ECMO 9-2019     Past Surgical History:  Left total knee arthoplasty 6/14/2012   Right hip arthroplasty 07/09/2004      Family History:   Mother:  thyroid disease, hypertension, skin cancer, CVA  Paternal grandmother: breast cancer, pancreatic cancer   Father: alcoholism   Sister: thyroid disease, alcoholism   Maternal grandmother: hypertension   Sister: heart disease, multiple ablations   Paternal grandfather: prostate cancer      Social History:   Current everyday cigarette smoker, 1 pack/day, 33 years, started 1982  No smokeless tobacco use  Occasional alcohol use   Physical Exam:   BP 93/63   Pulse 93    Wt Readings from Last 4 Encounters:   10/26/23 116.6 kg (257 lb)   09/07/23 121.1 kg (267 lb)   06/22/23 116.1 kg (256 lb)   05/24/23 120.5 kg (265 lb 11.2 oz)     Constitutional: Well-developed, appearing stated age; cooperative, severe obese  Eyes: Normal EOM, PERRLA, vision, conjunctiva, sclera  ENT: Normal external ears, nose, hearing, lips, teeth, gums, throat. No mucous membrane lesions, normal saliva pool  Neck: No mass or thyroid enlargement  MS: Reduced fist formation; +synovitis at MCPs, MTPs, wrists.  Shoulders with painful flexion beyond 60 degrees.  Tenderness at left anterior shoulder.  Impingement, left greater than right.  Skin: No nail pitting, alopecia, nodules or lesions.    Neuro: Normal cranial nerves  Psych: Normal judgement, orientation, memory, affect.     Laboratory:       Latest Ref Rng & Units 3/31/2023     2:39 PM 5/8/2023     3:03 PM 11/2/2023     2:55 PM   RHEUM RESULTS   Albumin 3.5 - 5.2 g/dL 3.8   3.5    ALT 0 - 50 U/L 9   26    AST 0 - 45 U/L 15   22    Creatinine 0.51 - 0.95 mg/dL 1.72   1.69    CRP Inflammation <5.00 mg/L   21.40    GFR Estimate >60 mL/min/1.73m2 33   34    Hematocrit 35.0 - 47.0 % 32.9  35.2  39.9    Hemoglobin 11.7 - 15.7 g/dL 10.5  11.5  12.6    WBC 4.0 - 11.0 10e3/uL 10.5  6.1  12.0    RBC Count 3.80 - 5.20 10e6/uL 3.51  3.90  4.32    RDW 10.0 - 15.0 % 14.8  13.3  14.7    MCHC 31.5 - 36.5 g/dL 31.9  32.7  31.6    MCV 78 - 100 fL 94  90  92    Platelet Count 150 - 450 10e3/uL 302  321  392         Rheumatoid Factor   Date Value Ref Range Status   08/28/2017 51 (H) <20 IU/mL Final     Cyclic Citrullinated Peptide Antibody, IgG   Date Value Ref Range Status   08/28/2017 15 (H) <7 U/mL Final     Comment:     Positive     EMERY Screen by EIA   Date Value Ref Range Status   12/30/2016 <1.0  Interpretation:  Negative   <1.0 Final     Hepatitis B Core Caron   Date Value Ref Range Status   04/12/2017 Nonreactive NR Final     Hep B Surface Agn   Date Value Ref Range Status   04/12/2017 Nonreactive NR Final     IGG   Date Value Ref Range Status   04/21/2017 940 695 - 1,620 mg/dL Final     IGA   Date Value Ref Range Status   04/21/2017 271 70 - 380 mg/dL Final     IGM   Date Value Ref Range Status   04/21/2017 395 (H) 60 - 265 mg/dL Final     Chief Complaint   Patient presents with    Follow Up     Blood pressure 93/63, pulse 93, not currently breastfeeding.      Again, thank you for allowing me to participate in the care of your patient.      Sincerely,    Everett Austin MD

## 2024-01-26 NOTE — PROGRESS NOTES
ANTICOAGULATION MANAGEMENT     Shira Rodriguez 63 year old female is on warfarin with supratherapeutic INR result. (Goal INR 2.0-3.0)    Recent labs: (last 7 days)     01/26/24  1600   INR 5.0*       ASSESSMENT     Source(s): Chart Review and Patient/Caregiver Call     Warfarin doses taken: Warfarin taken as instructed  Diet: No new diet changes identified  Medication/supplement changes: will start a prednisone taper today for RA symptoms.  She has been taking tylenol 1,000 mg/day--rheumatologist told her today she can take up to 3000 mg/day for pain.  She will start rinvoq after blood work is done(rinvoq should not interact with warfarin per micromedex)  New illness, injury, or hospitalization: Yes: is having increased pain/inflammation from RA  Signs or symptoms of bleeding or clotting: No  Previous result: Therapeutic last visit; previously outside of goal range  Additional findings: None       PLAN     Recommended plan for temporary change(s) affecting INR     Dosing Instructions: hold dose then continue your current warfarin dose with next INR in 3 days       Summary  As of 1/26/2024      Full warfarin instructions:  1/26: Hold; 1/27: 7.5 mg; Otherwise 7.5 mg every Mon, Thu, Sat; 10 mg all other days   Next INR check:  1/29/2024               Telephone call with Shira who agrees to plan and repeated back plan correctly    Lab visit scheduled    Education provided:   Symptom monitoring: monitoring for bleeding signs and symptoms, when to seek medical attention/emergency care, and if you hit your head or have a bad fall seek emergency care    Plan made per [facility] anticoagulation protocol    Tea Deng, RN  Anticoagulation Clinic  1/26/2024    _______________________________________________________________________     Anticoagulation Episode Summary       Current INR goal:  2.0-3.0   TTR:  48.0% (11.3 mo)   Target end date:  Indefinite   Send INR reminders to:  [facility]    Indications    Pulmonary  emboli (H) [I26.99]  Long term current use of anticoagulant therapy [Z79.01]             Comments:               Anticoagulation Care Providers       Provider Role Specialty Phone number    Anjel Busby MD Referring Family Medicine [phone]

## 2024-01-28 LAB
GAMMA INTERFERON BACKGROUND BLD IA-ACNC: 0.02 IU/ML
M TB IFN-G BLD-IMP: ABNORMAL
M TB IFN-G CD4+ BCKGRND COR BLD-ACNC: 0.14 IU/ML
MITOGEN IGNF BCKGRD COR BLD-ACNC: 0 IU/ML
MITOGEN IGNF BCKGRD COR BLD-ACNC: 0 IU/ML
QUANTIFERON MITOGEN: 0.16 IU/ML
QUANTIFERON NIL TUBE: 0.02 IU/ML
QUANTIFERON TB1 TUBE: 0.02 IU/ML
QUANTIFERON TB2 TUBE: 0.02

## 2024-01-29 ENCOUNTER — LAB (OUTPATIENT)
Dept: LAB | Facility: CLINIC | Age: 64
End: 2024-01-29
Payer: COMMERCIAL

## 2024-01-29 ENCOUNTER — MYC MEDICAL ADVICE (OUTPATIENT)
Dept: RHEUMATOLOGY | Facility: CLINIC | Age: 64
End: 2024-01-29

## 2024-01-29 ENCOUNTER — DOCUMENTATION ONLY (OUTPATIENT)
Dept: ANTICOAGULATION | Facility: CLINIC | Age: 64
End: 2024-01-29

## 2024-01-29 ENCOUNTER — ANTICOAGULATION THERAPY VISIT (OUTPATIENT)
Dept: ANTICOAGULATION | Facility: CLINIC | Age: 64
End: 2024-01-29

## 2024-01-29 DIAGNOSIS — I26.99 PULMONARY EMBOLI (H): Primary | ICD-10-CM

## 2024-01-29 DIAGNOSIS — Z79.01 LONG TERM CURRENT USE OF ANTICOAGULANT THERAPY: ICD-10-CM

## 2024-01-29 DIAGNOSIS — I26.99 PULMONARY EMBOLI (H): ICD-10-CM

## 2024-01-29 LAB — INR BLD: 2.3 (ref 0.9–1.1)

## 2024-01-29 PROCEDURE — 36416 COLLJ CAPILLARY BLOOD SPEC: CPT

## 2024-01-29 PROCEDURE — 85610 PROTHROMBIN TIME: CPT

## 2024-01-29 NOTE — PROGRESS NOTES
ANTICOAGULATION MANAGEMENT     Shira Rodriguez 63 year old female is on warfarin with therapeutic INR result. (Goal INR 2.0-3.0)    Recent labs: (last 7 days)     01/29/24  1428   INR 2.3*       ASSESSMENT     Source(s): Chart Review and Patient/Caregiver Call     Warfarin doses taken: Warfarin taken as instructed  Diet: No new diet changes identified  Medication/supplement changes:  Started Prednisone taper for RA symptoms of increased pain 1/26/24 (5 tabs x 1 week, then 4 tabs x 1 week, then 3 tabs x 1 week) - 1/29/24 patient reports pain is only slightly better. Shira reports she is taking Tylenol but only 1000 mg daily.  New illness, injury, or hospitalization: No  Signs or symptoms of bleeding or clotting: No  Previous result: Supratherapeutic - critical at 5.0 1/26/24 - patient confirms dose held this day  Additional findings:  Patient states she thinks 5.0 INR 1/26/24 was inaccurate. Shira elected to schedule INR recheck for 2/6/24.       PLAN     Recommended plan for temporary change(s) affecting INR     Dosing Instructions: Continue your current warfarin dose with next INR in 1 week       Summary  As of 1/29/2024      Full warfarin instructions:  7.5 mg every Mon, Thu, Sat; 10 mg all other days   Next INR check:  2/6/2024               Telephone call with Shira who verbalizes understanding and agrees to plan    Lab visit scheduled    Education provided:   Goal range and lab monitoring: goal range and significance of current result  Interaction IS anticipated between warfarin and Prednisone  Contact [phone] with any changes, questions or concerns.     Plan made per [facility] anticoagulation protocol    Jessica Moore RN  Anticoagulation Clinic  1/29/2024    _______________________________________________________________________     Anticoagulation Episode Summary       Current INR goal:  2.0-3.0   TTR:  47.8% (11.3 mo)   Target end date:  Indefinite   Send INR reminders to:  [facility]     Indications    Pulmonary emboli (H) [I26.99]  Long term current use of anticoagulant therapy [Z79.01]             Comments:               Anticoagulation Care Providers       Provider Role Specialty Phone number    Anjel Busby MD Referring Family Medicine [phone]

## 2024-01-29 NOTE — PROGRESS NOTES
ANTICOAGULATION CLINIC REFERRAL RENEWAL REQUEST       An annual renewal order is required for all patients referred to [facility].?  Please review and sign the pended referral order for Shira Rodriguez.       ANTICOAGULATION SUMMARY      Warfarin indication(s)   PE    Mechanical heart valve present?  NO       Current goal range   INR: 2.0-3.0     Goal appropriate for indication? Goal INR 2-3, standard for indication(s) above     Time in Therapeutic Range (TTR)  (Goal > 60%) 47.8%       Office visit with referring provider's group within last year yes on 5/24/2023       Jessica Moore RN  [facility]

## 2024-01-30 ENCOUNTER — TELEPHONE (OUTPATIENT)
Dept: RHEUMATOLOGY | Facility: CLINIC | Age: 64
End: 2024-01-30
Payer: COMMERCIAL

## 2024-01-30 NOTE — TELEPHONE ENCOUNTER
I am sorry to hear about ongoing pain. Thank you for the update. I recommend increasing medrol to 6 tabs today and through Friday, then drop to 4 tabs daily for 1 week.    Please respond to MT requests for consultation; MTM will help with recommended rinvoq so that longer-acting medication to treat arthritis gets started.    I recommend performing followup bloodwork next week as ordered.

## 2024-01-30 NOTE — TELEPHONE ENCOUNTER
MTM referral from: [facility] visit (referral by provider)    MTM referral outreach attempt #2 on January 30, 2024 at 11:27 AM      Outcome: Patient not reachable after several attempts, will route to [facility] Pharmacist/Provider as an FYI.  [facility] scheduling number is .  Thank you for the referral.    Use hbc for the carrier/Plan on the flowsheet      [employer] Message Sent    ASIA Doe

## 2024-01-31 ENCOUNTER — VIRTUAL VISIT (OUTPATIENT)
Dept: RHEUMATOLOGY | Facility: CLINIC | Age: 64
End: 2024-01-31
Attending: INTERNAL MEDICINE
Payer: COMMERCIAL

## 2024-01-31 DIAGNOSIS — M06.9 RHEUMATOID ARTHRITIS, INVOLVING UNSPECIFIED SITE, UNSPECIFIED WHETHER RHEUMATOID FACTOR PRESENT (H): Primary | ICD-10-CM

## 2024-01-31 DIAGNOSIS — Z71.85 VACCINE COUNSELING: ICD-10-CM

## 2024-01-31 DIAGNOSIS — E78.5 HYPERLIPIDEMIA, UNSPECIFIED HYPERLIPIDEMIA TYPE: ICD-10-CM

## 2024-01-31 DIAGNOSIS — Z86.711 HISTORY OF PULMONARY EMBOLISM: ICD-10-CM

## 2024-01-31 NOTE — Clinical Note
Silvestre Austin! I discussed Rinvoq with Shira yesterday, she mentioned she is on warfarin due to history of serious PE. Wondering if we might want to consider an alternative to LATRELL inhibitor given the increased clot risk? She did say she'd be open to doing injections if an alternative is preferred. Please let me know your thoughts, thanks!

## 2024-01-31 NOTE — Clinical Note
1/31/2024       RE: Shira Rodriguez  [address]  [place] [address]     Dear Colleague,    Thank you for referring your patient, Shira Rodriguez, to the [facility] RHEUMATOLOG[facility] [place] at [facility]. Please see a copy of my visit note below.    Medication Therapy Management (MTM) Encounter    ASSESSMENT:                            Medication Adherence/Access: {adherencechoices:666319}    Rheumatoid arthritis: ***    Vaccine counseling: ***    PLAN:                            Start Rinvoq 15 mg once daily once approved. Report any side effect concerns or infections.  ***    Follow-up: ***    SUBJECTIVE/OBJECTIVE:                          Shira Rodriguez is a 63 year old female called for an initial visit. She was referred to me from Everett Austin MD.      Reason for visit: New start Rinvoq.    Allergies/ADRs: Reviewed in chart  Past Medical History: Reviewed in chart  Tobacco: She reports that she quit smoking about 4 years ago. Her smoking use included cigarettes. She started smoking about 42 years ago. She has never used smokeless tobacco.  Alcohol: not assessed today    Medication Adherence/Access: {fumedadherence:740954}    Rheumatoid arthritis:  Hydroxychloroquine 200 mg twice daily  Methylprednisolone 20 mg daily x7 days, then 16 mg daily x7 days, then 12 mg daily x7 days (ordered 1/26/24)  Acetaminophen 650 mg every 6 hours as needed  Folic acid 1 mg daily - no longer taking? ***     Patient with severe joint pain mostly in hands, fingers, feet, wrists, and elbows as well as significant morning stiffness. Previously had excellent response to methotrexate but had to stop it due to myelosuppression and mouth sores. Did also have lengthy hospitalization likely related to myelosuppression and will be avoiding methotrexate indefinitely. Just started hydroxychloroquine and methylprednisolone burst/taper. *** Has had a  little improvement but feet still swollen.    Is on warfarin, INR typically is 2-3. ***    Reviewed baseline pre-biologic screening.   Hep C antibody non-reactive  Hep B surface antigen non-reactive  Hep B core antibody non-reactive  Quantiferon TB Negative  HIV antigen non-reactive    CBC RESULTS:   Recent Labs   Lab Test 01/26/24  1602   WBC 7.9   RBC 3.34*   HGB 9.7*   HCT 31.0*   MCV 93   MCH 29.0   MCHC 31.3*   RDW 13.4         Component      Latest Ref Rng 1/26/2024  4:02 PM   Sodium      135 - 145 mmol/L 139    Potassium      3.4 - 5.3 mmol/L 4.3    Carbon Dioxide (CO2)      22 - 29 mmol/L 26    Anion Gap      7 - 15 mmol/L 10    Urea Nitrogen      8.0 - 23.0 mg/dL 26.8 (H)    Creatinine      0.51 - 0.95 mg/dL 1.91 (H)    GFR Estimate      >60 mL/min/1.73m2 29 (L)    Calcium      8.8 - 10.2 mg/dL 10.5 (H)    Chloride      98 - 107 mmol/L 103    Glucose      70 - 99 mg/dL 108 (H)    Alkaline Phosphatase      40 - 150 U/L 51    AST      0 - 45 U/L 14    ALT      0 - 50 U/L 9    Protein Total      6.4 - 8.3 g/dL 7.5    Albumin      3.5 - 5.2 g/dL 3.0 (L)    Bilirubin Total      <=1.2 mg/dL 0.2       ***: ***     Not taking atorva anymore, she is not sure why she stopped it but would be willing to restart    Needs annual eye exams for hydroxychloroquine    Does not take BP at home    Flexagan supplement    Today's Vitals: There were no vitals taken for this visit.  ----------------    I spent {mtm total time 3:792455} with this patient today. All changes were made via collaborative practice agreement with Everett Austin. A copy of the visit note was provided to the patient's provider(s).    A summary of these recommendations was sent via [employer].    Mindi Murry, PharmD  Medication Therapy Management Pharmacist  [facility]     Telemedicine Visit Details  Type of service:  Telephone visit  Start Time: {video/phone visit start time:726706}  End Time: {video/phone visit end  time:224385}     Medication Therapy Recommendations  No medication therapy recommendations to display     Medication Therapy Management (MTM) Encounter    ASSESSMENT:                            Medication Adherence/Access: No issues identified.    Rheumatoid arthritis: Patient has history of significant PE leading to cardiac arrest, note increased risk of thrombosis with LATRELL inhibition. Despite chronic anticoagulation, may benefit from considering an alternative that does not increase clot risk. Patient is open to injectable medications per her report today.    Vaccine counseling: Up to date on vaccines per ACIP recommendations.    PE prophylaxis: Anticoagulation is monitored by warfarin clinic.     Hyperlipidemia: Patient is non-adherent to statin, LDL now above goal of < 70 mg/dL and was previously well controlled. Would benefit from restarting statin therapy.    PLAN:                            Writer will discuss possible alternatives to Rinvoq with provider given patient's history of significant PE and increased risk of clotting with LATRELL inhibition.  Consider restarting statin, will discuss with primary provider and update order as appropriate.    Follow-up: Pending rheumatologist response re: considering alternative to Rinvoq. Will reorder statin pending primary provider response.    SUBJECTIVE/OBJECTIVE:                          Shira Rodriguez is a 63 year old female called for an initial visit. She was referred to me from Everett Austin MD.      Reason for visit: New start Rinvoq.    Allergies/ADRs: Reviewed in chart  Past Medical History: Reviewed in chart  Tobacco: She reports that she quit smoking about 4 years ago. Her smoking use included cigarettes. She started smoking about 42 years ago. She has never used smokeless tobacco.  Alcohol: not assessed today    Medication Adherence/Access: No issues identified.    Rheumatoid arthritis:  Hydroxychloroquine 200 mg twice daily  Methylprednisolone 20 mg daily  x7 days, then 16 mg daily x7 days, then 12 mg daily x7 days (ordered 1/26/24)  Acetaminophen 650 mg every 6 hours as needed     Patient with severe joint pain mostly in hands, fingers, feet, wrists, and elbows as well as significant morning stiffness. Previously had excellent response to methotrexate but had to stop it due to myelosuppression and mouth sores. Did also have lengthy hospitalization likely related to myelosuppression and will be avoiding methotrexate indefinitely. Just started hydroxychloroquine and methylprednisolone burst/taper. Has had small improvements. Discussed clot risk and patient notes she is on warfarin as below.    Reviewed baseline pre-biologic screening.   Hep C antibody non-reactive  Hep B surface antigen non-reactive  Hep B core antibody non-reactive  Quantiferon TB Negative  HIV antigen non-reactive    CBC RESULTS:   Recent Labs   Lab Test 01/26/24  1602   WBC 7.9   RBC 3.34*   HGB 9.7*   HCT 31.0*   MCV 93   MCH 29.0   MCHC 31.3*   RDW 13.4         Component      Latest Ref Rng 1/26/2024  4:02 PM   Sodium      135 - 145 mmol/L 139    Potassium      3.4 - 5.3 mmol/L 4.3    Carbon Dioxide (CO2)      22 - 29 mmol/L 26    Anion Gap      7 - 15 mmol/L 10    Urea Nitrogen      8.0 - 23.0 mg/dL 26.8 (H)    Creatinine      0.51 - 0.95 mg/dL 1.91 (H)    GFR Estimate      >60 mL/min/1.73m2 29 (L)    Calcium      8.8 - 10.2 mg/dL 10.5 (H)    Chloride      98 - 107 mmol/L 103    Glucose      70 - 99 mg/dL 108 (H)    Alkaline Phosphatase      40 - 150 U/L 51    AST      0 - 45 U/L 14    ALT      0 - 50 U/L 9    Protein Total      6.4 - 8.3 g/dL 7.5    Albumin      3.5 - 5.2 g/dL 3.0 (L)    Bilirubin Total      <=1.2 mg/dL 0.2       Vaccine counseling: Patient is open to receiving recommended vaccines.    Immunization History   Administered Date(s) Administered     COVID-19 Bivalent 12+ (Pfizer) 12/06/2022, 05/24/2023     COVID-19 MONOVALENT 12+ (Pfizer) 03/12/2021, 04/02/2021, 12/30/2021      COVID-19 Monovalent 12+ (Pfizer 2022) 05/24/2022     Influenza (IIV3) PF 10/04/2011, 10/03/2012, 09/24/2016, 10/16/2017     Influenza Vaccine 18-64 (Flublok) 09/29/2019, 10/12/2022     Influenza Vaccine >6 months,quad, PF 10/03/2018, 12/30/2021     Pneumo Conj 13-V (2010&after) 08/28/2017, 05/23/2019     Pneumococcal 23 valent 12/04/2017     TDAP (Adacel,Boostrix) 07/16/2010     TDAP Vaccine (Boostrix) 08/21/2015     Zoster recombinant adjuvanted (SHINGRIX) 06/04/2018, 05/23/2019     PE prophylaxis:  Warfarin as directed by anticoagulation clinic    Had serious PE leading to cardiac arrest. Patient notes INR typically 2-3. No unusual bleeding. Attends anticoagulation clinic appointments as scheduled.    INR   Date Value Ref Range Status   01/29/2024 2.3 (H) 0.9 - 1.1 Final   06/16/2021 1.88 (H) 0.86 - 1.14 Final     INR (External)   Date Value Ref Range Status   07/10/2023 2.7 (A) 0.9 - 1.1 Final       Hyperlipidemia: Not taking atorvastatin at this time and does not remember why she stopped it. She doesn't think it was due to side effects such as muscle pain. Would be willing to restart.     Recent Labs   Lab Test 07/20/23  1446 02/01/23  0734   CHOL 191 84   HDL 59 24*   * 45   TRIG 89 76      Today's Vitals: There were no vitals taken for this visit.  ----------------    I spent 45 minutes with this patient today. All changes were made via collaborative practice agreement with Everett Austin. A copy of the visit note was provided to the patient's provider(s).    A summary of these recommendations was sent via [employer].    Mindi Murry, PharmD  Medication Therapy Management Pharmacist  [facility]     Telemedicine Visit Details  Type of service:  Telephone visit  Start Time:  1500  End Time:  1545     Medication Therapy Recommendations  Rheumatoid arthritis (H)    Rationale: Unsafe medication for the patient - Adverse medication event - Safety   Recommendation: Change  Medication - Consider alternative to Rinvoq given risks of clotting with Camelia and patient's history of PE   Status: Contact Provider - Awaiting Response               Again, thank you for allowing me to participate in the care of your patient.      Sincerely,    Mindi Murry, [facility]

## 2024-01-31 NOTE — PROGRESS NOTES
Medication Therapy Management (MTM) Encounter    ASSESSMENT:                            Medication Adherence/Access: No issues identified.    Rheumatoid arthritis: Patient has history of significant PE leading to cardiac arrest, note increased risk of thrombosis with LATRELL inhibition. Despite chronic anticoagulation, may benefit from considering an alternative that does not increase clot risk. Discussed with provider who determined benefits outweigh risks given data showing increased risk of clotting is associated with tofacitinib, not upadacitinib. Education provided today including administration, common and serious side effects (including possibility of clot risk as above and risk of infection), lab monitoring, and time to efficacy.    Vaccine counseling: Up to date on vaccines per ACIP recommendations.    PE prophylaxis: Anticoagulation is monitored by warfarin clinic.     Hyperlipidemia: Patient is non-adherent to statin, LDL now above goal of < 70 mg/dL and was previously well controlled. Would benefit from restarting statin therapy.    PLAN:                            Once approved start Rinvoq 15 mg once daily. Report any concerning side effects to rheumatology office.  Consider restarting statin, will discuss with primary provider and update order as appropriate.    Follow-up: 1 month for tolerability, 3 months for efficacy or Rinvoq. Will reorder statin pending primary provider response.    SUBJECTIVE/OBJECTIVE:                          Shira Rodriguez is a 63 year old female called for an initial visit. She was referred to me from Everett Austin MD.      Reason for visit: New start Rinvoq.    Allergies/ADRs: Reviewed in chart  Past Medical History: Reviewed in chart  Tobacco: She reports that she quit smoking about 4 years ago. Her smoking use included cigarettes. She started smoking about 42 years ago. She has never used smokeless tobacco.  Alcohol: not assessed today    Medication Adherence/Access: No  issues identified.    Rheumatoid arthritis:  Hydroxychloroquine 200 mg twice daily  Methylprednisolone 20 mg daily x7 days, then 16 mg daily x7 days, then 12 mg daily x7 days (ordered 1/26/24)  Acetaminophen 650 mg every 6 hours as needed     Patient with severe joint pain mostly in hands, fingers, feet, wrists, and elbows as well as significant morning stiffness. Previously had excellent response to methotrexate but had to stop it due to myelosuppression and mouth sores. Did also have lengthy hospitalization likely related to myelosuppression and will be avoiding methotrexate indefinitely. Just started hydroxychloroquine and methylprednisolone burst/taper. Has had small improvements. Discussed clot risk and patient notes she is on warfarin as below.    Reviewed baseline pre-biologic screening.   Hep C antibody non-reactive  Hep B surface antigen non-reactive  Hep B core antibody non-reactive  Quantiferon TB Negative  HIV antigen non-reactive    CBC RESULTS:   Recent Labs   Lab Test 01/26/24  1602   WBC 7.9   RBC 3.34*   HGB 9.7*   HCT 31.0*   MCV 93   MCH 29.0   MCHC 31.3*   RDW 13.4         Component      Latest Ref Rng 1/26/2024  4:02 PM   Sodium      135 - 145 mmol/L 139    Potassium      3.4 - 5.3 mmol/L 4.3    Carbon Dioxide (CO2)      22 - 29 mmol/L 26    Anion Gap      7 - 15 mmol/L 10    Urea Nitrogen      8.0 - 23.0 mg/dL 26.8 (H)    Creatinine      0.51 - 0.95 mg/dL 1.91 (H)    GFR Estimate      >60 mL/min/1.73m2 29 (L)    Calcium      8.8 - 10.2 mg/dL 10.5 (H)    Chloride      98 - 107 mmol/L 103    Glucose      70 - 99 mg/dL 108 (H)    Alkaline Phosphatase      40 - 150 U/L 51    AST      0 - 45 U/L 14    ALT      0 - 50 U/L 9    Protein Total      6.4 - 8.3 g/dL 7.5    Albumin      3.5 - 5.2 g/dL 3.0 (L)    Bilirubin Total      <=1.2 mg/dL 0.2       Vaccine counseling: Patient is open to receiving recommended vaccines.    Immunization History   Administered Date(s) Administered    COVID-19  Bivalent 12+ (Pfizer) 12/06/2022, 05/24/2023    COVID-19 MONOVALENT 12+ (Pfizer) 03/12/2021, 04/02/2021, 12/30/2021    COVID-19 Monovalent 12+ (Pfizer 2022) 05/24/2022    Influenza (IIV3) PF 10/04/2011, 10/03/2012, 09/24/2016, 10/16/2017    Influenza Vaccine 18-64 (Flublok) 09/29/2019, 10/12/2022    Influenza Vaccine >6 months,quad, PF 10/03/2018, 12/30/2021    Pneumo Conj 13-V (2010&after) 08/28/2017, 05/23/2019    Pneumococcal 23 valent 12/04/2017    TDAP (Adacel,Boostrix) 07/16/2010    TDAP Vaccine (Boostrix) 08/21/2015    Zoster recombinant adjuvanted (SHINGRIX) 06/04/2018, 05/23/2019     PE prophylaxis:  Warfarin as directed by anticoagulation clinic    Had serious PE leading to cardiac arrest. Patient notes INR typically 2-3. No unusual bleeding. Attends anticoagulation clinic appointments as scheduled.    INR   Date Value Ref Range Status   01/29/2024 2.3 (H) 0.9 - 1.1 Final   06/16/2021 1.88 (H) 0.86 - 1.14 Final     INR (External)   Date Value Ref Range Status   07/10/2023 2.7 (A) 0.9 - 1.1 Final       Hyperlipidemia: Not taking atorvastatin at this time and does not remember why she stopped it. She doesn't think it was due to side effects such as muscle pain. Would be willing to restart.     Recent Labs   Lab Test 07/20/23  1446 02/01/23  0734   CHOL 191 84   HDL 59 24*   * 45   TRIG 89 76      Today's Vitals: There were no vitals taken for this visit.  ----------------    I spent 45 minutes with this patient today. All changes were made via collaborative practice agreement with Everett Austin. A copy of the visit note was provided to the patient's provider(s).    A summary of these recommendations was sent via [employer].    Mindi Murry, PharmD  Medication Therapy Management Pharmacist  [facility]     Telemedicine Visit Details  Type of service:  Telephone visit  Start Time:  1500  End Time:  1545     Medication Therapy Recommendations  Rheumatoid arthritis (H)     Rationale: Unsafe medication for the patient - Adverse medication event - Safety   Recommendation: Change Medication - Consider alternative to Rinvoq given risks of clotting with Camelia and patient's history of PE   Status: Contact Provider - Awaiting Response

## 2024-02-01 NOTE — PATIENT INSTRUCTIONS
"Recommendations from today's MTM visit:                                                      Writer will discuss possible alternatives to Rinvoq with provider given patient's history of significant PE and increased risk of clotting with LATRELL inhibition.  Consider restarting statin, will discuss with primary provider and update order as appropriate.    Follow-up: Pending rheumatologist response re: considering alternative to Rinvoq. Will reorder statin pending primary provider response.    It was great speaking with you today.  I value your experience and would be very thankful for your time in providing feedback in our clinic survey. In the next few days, you may receive an email or text message from [employer] with a link to a survey related to your  clinical pharmacist.\"     To schedule another MTM appointment, please call the clinic directly or you may call the MTM scheduling line at [phone] or toll-free at [phone].     My Clinical Pharmacist's contact information:                                                      Please feel free to contact me with any questions or concerns you have.      Mindi Murry, PharmD  Medication Therapy Management Pharmacist  [facility]    "

## 2024-02-05 ENCOUNTER — TELEPHONE (OUTPATIENT)
Dept: RHEUMATOLOGY | Facility: CLINIC | Age: 64
End: 2024-02-05
Payer: COMMERCIAL

## 2024-02-05 RX ORDER — UPADACITINIB 15 MG/1
15 TABLET, EXTENDED RELEASE ORAL DAILY
Qty: 30 TABLET | Refills: 3 | Status: SHIPPED | OUTPATIENT
Start: 2024-02-05 | End: 2024-06-07

## 2024-02-05 NOTE — TELEPHONE ENCOUNTER
PA Initiation    Medication: RINVOQ 15 MG PO TB24  Insurance Company: [facility] - Phone [phone] Fax [phone]:  PART D  Pharmacy Filling the Rx:    Filling Pharmacy Phone:    Filling Pharmacy Fax:    Start Date: 2/5/2024    [id number]

## 2024-02-06 ENCOUNTER — ANTICOAGULATION THERAPY VISIT (OUTPATIENT)
Dept: ANTICOAGULATION | Facility: CLINIC | Age: 64
End: 2024-02-06

## 2024-02-06 ENCOUNTER — LAB (OUTPATIENT)
Dept: LAB | Facility: CLINIC | Age: 64
End: 2024-02-06
Payer: COMMERCIAL

## 2024-02-06 DIAGNOSIS — Z79.01 LONG TERM CURRENT USE OF ANTICOAGULANT THERAPY: ICD-10-CM

## 2024-02-06 DIAGNOSIS — I26.99 PULMONARY EMBOLI (H): Primary | ICD-10-CM

## 2024-02-06 DIAGNOSIS — I26.99 PULMONARY EMBOLI (H): ICD-10-CM

## 2024-02-06 LAB — INR BLD: 2.2 (ref 0.9–1.1)

## 2024-02-06 PROCEDURE — 85610 PROTHROMBIN TIME: CPT

## 2024-02-06 PROCEDURE — 36416 COLLJ CAPILLARY BLOOD SPEC: CPT

## 2024-02-06 NOTE — PROGRESS NOTES
ANTICOAGULATION MANAGEMENT     Shira Rodriguez 63 year old female is on warfarin with therapeutic INR result. (Goal INR 2.0-3.0)    Recent labs: (last 7 days)     02/06/24  1456   INR 2.2*       ASSESSMENT     Warfarin Lab Questionnaire    Warfarin Doses Last 7 Days      2/6/2024     2:51 PM   Dose in Tablet or Mg   TAB or MG? milligram (mg)     Pt Rptd Dose SUNDAY MONDAY TUESDAY WED THURS FRIDAY SATURDAY 2/6/2024   2:51 PM 10 7.5 10 10 7.5 10 7.5         2/6/2024   Warfarin Lab Questionnaire   Missed doses within past 14 days? No   Changes in diet or alcohol within past 14 days? No   Medication changes since last result? No   Injuries or illness since last result? No   New shortness of breath, severe headaches or sudden changes in vision since last result? No   Abnormal bleeding since last result? No   Upcoming surgery, procedure? No   Best number to call with results? [phone]     Previous result: Therapeutic last visit; previously outside of goal range  Additional findings: Continues on 3 week prednisone: 1/26- 2/16 (5 tabs x 1 week, then 4 tabs x 1 week, then 3 tabs x 1 week) also taking tylenol for pain.      PLAN     Recommended plan for ongoing change(s) affecting INR     Dosing Instructions: Continue your current warfarin dose with next INR in 1 week       Summary  As of 2/6/2024      Full warfarin instructions:  7.5 mg every Mon, Thu, Sat; 10 mg all other days   Next INR check:  2/13/2024               Detailed voice message left for Shira with dosing instructions and follow up date.     Contact [phone] to schedule and with any changes, questions or concerns.     Education provided:   Please call back if any changes to your diet, medications or how you've been taking warfarin    Plan made per [facility] anticoagulation protocol    Peggy Martínez RN  Anticoagulation Clinic  2/6/2024    _______________________________________________________________________     Anticoagulation Episode Summary        Current INR goal:  2.0-3.0   TTR:  49.0% (11.6 mo)   Target end date:  Indefinite   Send INR reminders to:  [facility]    Indications    Pulmonary emboli (H) [I26.99]  Long term current use of anticoagulant therapy [Z79.01]             Comments:               Anticoagulation Care Providers       Provider Role Specialty Phone number    Anjel Busby MD Referring Family Medicine [phone]

## 2024-02-07 ENCOUNTER — MYC MEDICAL ADVICE (OUTPATIENT)
Dept: PHARMACY | Facility: CLINIC | Age: 64
End: 2024-02-07
Payer: COMMERCIAL

## 2024-02-07 NOTE — TELEPHONE ENCOUNTER
Prior Authorization Approval    Medication: RINVOQ 15 MG PO TB24  Authorization Effective Date: 2/7/2024  Authorization Expiration Date: 2/5/2025  Approved Dose/Quantity: qd  Reference #: [id number]   Insurance Company: [employer] [place] - Phone [phone] Fax [phone]:  PART D  Expected CoPay: $ 1,907.65  CoPay Card Available: No    Financial Assistance Needed: will offer to patient  Which Pharmacy is filling the prescription: [facility] if not free drug, free drug [facility]  Pharmacy Notified: no  Patient Notified: yes

## 2024-02-19 ENCOUNTER — MYC MEDICAL ADVICE (OUTPATIENT)
Dept: ANTICOAGULATION | Facility: CLINIC | Age: 64
End: 2024-02-19
Payer: COMMERCIAL

## 2024-02-19 NOTE — LETTER
9/7/2023         RE: Shira Rodriguez  [address]  [place] [address]        Dear Colleague,    Thank you for referring your patient, Shira Rodriguez, to the [facility] [place]. Please see a copy of my visit note below.    CHIEF COMPLAINT:  Pain of the Right Knee     HISTORY OF PRESENT ILLNESS  Ms. Rodriguez is a pleasant 63 year old female who presents to clinic today with right knee pain.  Shira explains that her right knee has been bothering her for a long time. She has seen Dr Saxena and Dr Duque previously and received cortisone injections. The most recent injection was November 28th 2022 that provided about 6 months of relief. Now the pain has returned and the knee is stiff and painful. The patient would like to get another injection today     Onset: gradual  Location: right knee  Quality:  sharp  Duration: 5+ years   Severity:intermittent episodes with walking and weightbearing, no pain at rest  Modifying factors:  resting and non-use makes it better, movement and use makes it worse  Associated signs & symptoms: pain  Previous similar pain: No  Treatments to date: right knee cortisone injection (most recent 11/28/22, Dr. Duque, provided relief for 6 months), tylenol    Additional history: as documented    Review of Systems:  A 10-point review of systems was obtained and is negative except for as noted in the HPI.       MEDICAL HISTORY  Patient Active Problem List   Diagnosis     Primary localized osteoarthrosis, pelvic region and thigh     Adjustment disorder with depressed mood     Tobacco use disorder     Morbid obesity (H)     Arthritis of knee, degenerative     Degeneration of cervical intervertebral disc     CYRUS (obstructive sleep apnea)     Elevated blood pressure (not hypertension)     Pulmonary emboli (H)     Rheumatoid arthritis (H)     Cardiac arrest (H)     Long term current use of anticoagulant therapy     Bacterial sepsis (H)      Neutropenic fever (H)     Loss of hair     Seborrheic dermatitis       Current Outpatient Medications   Medication Sig Dispense Refill     acetaminophen (TYLENOL) 325 MG tablet Take 2 tablets (650 mg) by mouth every 6 hours as needed for mild pain or fever       ARIPiprazole (ABILIFY) 5 MG tablet Take 1 tablet by mouth daily at 2 pm       cyanocobalamin (VITAMIN B-12) 500 MCG SUBL sublingual tablet Place 2 tablets (1,000 mcg) under the tongue daily 180 tablet 1     ferrous sulfate (FEROSUL) 325 (65 Fe) MG tablet Take 1 tablet (325 mg) by mouth daily . Take with citrus. 30 tablet 3     folic acid (FOLVITE) 1 MG tablet Take 2 tablets (2 mg) by mouth daily 180 tablet 1     lisinopril (ZESTRIL) 5 MG tablet Take 1 tablet (5 mg) by mouth daily 90 tablet 3     melatonin 3 MG tablet Take 1 tablet (3 mg) by mouth nightly as needed for sleep       multivitamin w/minerals (THERA-VIT-M) tablet Take 1 tablet by mouth daily 100 tablet 1     nystatin (MYCOSTATIN) 450706 UNIT/GM external powder Apply topically 4 times daily as needed 60 g 1     senna-docusate (SENOKOT-S/PERICOLACE) 8.6-50 MG tablet Take 1 tablet by mouth 2 times daily as needed for constipation 180 tablet 1     VITAMIN D3 25 MCG (1000 UT) tablet TAKE 2 TABLETS BY MOUTH EVERY DAY       warfarin ANTICOAGULANT (COUMADIN) 5 MG tablet Take 1.5 to 2 tablets by mouth every day OR as directed by Anticoagulation Clinic-adjusted per INR results 180 tablet 1     Warfarin Therapy Reminder Take 1 each by mouth See Admin Instructions       albuterol (PROAIR HFA/PROVENTIL HFA/VENTOLIN HFA) 108 (90 Base) MCG/ACT inhaler Inhale 2 puffs into the lungs every 6 hours as needed for shortness of breath / dyspnea or wheezing (Patient not taking: Reported on 5/24/2023) 18 g 5     atorvastatin (LIPITOR) 40 MG tablet Take 1 tablet (40 mg) by mouth every evening (Patient not taking: Reported on 5/24/2023) 30 tablet 0     DULoxetine (CYMBALTA) 60 MG capsule Take 1 capsule (60 mg) by mouth  "daily (Patient taking differently: Take 120 mg by mouth daily) 30 capsule 0     gabapentin (NEURONTIN) 100 MG capsule Take 1 capsule (100 mg) by mouth 3 times daily (Patient not taking: Reported on 2023) 90 capsule 0     insulin syringe-needle U-100 (BD INSULIN SYRINGE ULTRAFINE) 30G X 1/2\" 1 ML miscellaneous For Methotrexate use weekly. (Patient not taking: Reported on 2023) 12 each 3     ketoconazole (NIZORAL) 2 % external shampoo Apply topically daily as needed for itching or irritation 120 mL 2     miconazole (MICATIN) 2 % external cream Apply topically 2 times daily (Patient not taking: Reported on 2023)       pantoprazole (PROTONIX) 40 MG EC tablet Take 1 tablet (40 mg) by mouth 2 times daily (before meals) (Patient not taking: Reported on 2023)         Allergies   Allergen Reactions     Adhesive Tape Blisters     Erythromycin Rash       Family History   Problem Relation Age of Onset     Thyroid Disease Mother      Hypertension Mother      Skin Cancer Mother         MMohs surgery with skin graft     Cerebrovascular Disease Mother         CVA after endarderectomy-      Diabetes Mother      Depression Mother      Alcoholism Father      Heart Disease Father      Substance Abuse Father              Heart Disease Sister         multiple ablations     Alcoholism Sister      Substance Abuse Sister         Sober     Depression Sister      Substance Abuse Sister         Sober 30 plus years     Depression Sister      Thyroid Disease Sister      Hypertension Maternal Grandmother      Depression Maternal Grandmother      Breast Cancer Paternal Grandmother      Pancreatic Cancer Paternal Grandmother      Prostate Cancer Paternal Grandfather      Cardiovascular Paternal Aunt      Cardiovascular Paternal Uncle      Depression Cousin      Depression Other        Additional medical/Social/Surgical histories reviewed in [facility] and updated as appropriate.       PHYSICAL EXAM  /70   Ht " "1.676 m (5' 6\")   Wt 121.1 kg (267 lb)   BMI 43.09 kg/m      General  - normal appearance, in no obvious distress  Musculoskeletal - left knee  - stance: mildly antalgic gait, mild genu varum  - inspection: trace effusion  - palpation: medial joint line tenderness  - ROM: 110 degrees flexion, 0 degrees extension, painful active ROM  - strength: 5/5 in flexion, 5/5 in extension  - special tests:  (-) Avelina  (-) varus at 0 and 30 degrees flexion  (-) valgus at 0 and 30 degrees flexion  Neuro  - no sensory or motor deficit, grossly normal coordination, normal muscle tone       IMAGING :   Exam: Single frontal view of both knees and a lateral and axial view  of the right knee dated 12/31/2019.     COMPARISON: 8/2/2017.     CLINICAL HISTORY: Right knee pain.     FINDINGS:      Single frontal view of both knees and a lateral and axial view of the  right knee were obtained.     Left knee: Post surgical changes of a left total knee arthroplasty  without complication.     Right knee: Tricompartmental osteophytic spurring in the right knee  with joint space narrowing in the medial femorotibial joint  compartment of the right knee. No large right knee joint effusion.                                                                      IMPRESSION:  1. Postsurgical changes of a left total knee arthroplasty without  complication.  2. Osteoarthrosis in the right knee with joint space narrowing,  greatest in the medial femorotibial joint compartment.     HONG RAMIREZ MD     ASSESSMENT & PLAN  Ms. Rodriguez is a 63 year old year old female hx of left knee TKA who presents to clinic today with acute on chronic right knee pain secondary to known osteoarthritis of right knee.    Last injection provided 6+ months relief.    Diagnosis: Primary osteoarthritis right knee    -Repeat corticosteroid injection today as this has provided ongoing benefit.  Can for antalgic gait and stability.  Follow up PRN 6 months.    PROCEDURE    Right " Knee Injection - Intraarticular  The patient was informed of the risks and the benefits of the procedure and a written consent was signed.  The patient s right knee was prepped with chlorhexidine in sterile fashion.   40 mg of triamcinolone suspension was drawn up into a 5 mL syringe with 4 mL of 1% lidocaine.  Injection was performed using substerile technique.  A 1.5-inch 22-gauge needle was used to enter the lateral aspect of the right knee.  Injection performed successfully without difficulty.  There were no complications. The patient tolerated the procedure well. There was negligible bleeding.   The patient was instructed to ice the knee upon leaving clinic and refrain from overuse over the next 3 days.   The patient was instructed to call or go to the emergency room with any unusual pain, swelling, redness, or if otherwise concerned.  A follow up appointment will be scheduled to evaluate response to the injection, and to assess range of motion and pain.      It was a pleasure seeing Shira today.    Tashi Joy DO, [facility]  Primary Care Sports Medicine     Large Joint Injection/Arthocentesis: R knee joint    Date/Time: 9/7/2023 4:35 PM    Performed by: Tashi Joy DO  Authorized by: Tashi Joy DO    Indications:  Pain  Needle Size:  22 G  Guidance: landmark guided    Approach:  Anterolateral  Location:  Knee      Medications:  40 mg triamcinolone 40 MG/ML; 4 mL lidocaine 1 %  Outcome:  Tolerated well, no immediate complications  Procedure discussed: discussed risks, benefits, and alternatives    Consent Given by:  Patient  Timeout: timeout called immediately prior to procedure    Prep: patient was prepped and draped in usual sterile fashion          Again, thank you for allowing me to participate in the care of your patient.        Sincerely,        Tashi Joy DO   no

## 2024-02-23 ENCOUNTER — LAB (OUTPATIENT)
Dept: LAB | Facility: CLINIC | Age: 64
End: 2024-02-23
Payer: COMMERCIAL

## 2024-02-23 ENCOUNTER — ANTICOAGULATION THERAPY VISIT (OUTPATIENT)
Dept: ANTICOAGULATION | Facility: CLINIC | Age: 64
End: 2024-02-23

## 2024-02-23 DIAGNOSIS — Z79.01 LONG TERM CURRENT USE OF ANTICOAGULANT THERAPY: ICD-10-CM

## 2024-02-23 DIAGNOSIS — M06.00 RHEUMATOID ARTHRITIS WITH NEGATIVE RHEUMATOID FACTOR, INVOLVING UNSPECIFIED SITE (H): ICD-10-CM

## 2024-02-23 DIAGNOSIS — I26.99 PULMONARY EMBOLI (H): ICD-10-CM

## 2024-02-23 DIAGNOSIS — I26.99 PULMONARY EMBOLI (H): Primary | ICD-10-CM

## 2024-02-23 LAB — INR BLD: 1.6 (ref 0.9–1.1)

## 2024-02-23 PROCEDURE — 85610 PROTHROMBIN TIME: CPT

## 2024-02-23 PROCEDURE — 36416 COLLJ CAPILLARY BLOOD SPEC: CPT

## 2024-02-23 NOTE — TELEPHONE ENCOUNTER
STAR INITIATED    Medication: RINVOQ 15 MG PO TB24  Foundation Name: [facility]  Date submitted: 2/23/2024  3:29 PM     Waiting for patients income, went through disclosure statement, pt does not qualify for LIS, no assets, 1 person household, fransisco as urgent

## 2024-02-23 NOTE — TELEPHONE ENCOUNTER
FREE DRUG APPLICATION INITIATED    Medication: RINVOQ 15 MG PO TB24  Free Drug Program Name:  [facility]  Date Submitted: 2/23/2024  3:13 PM  Phone: [phone]   Fax: [phone]  Additional Information: sent via fax

## 2024-02-23 NOTE — PROGRESS NOTES
ANTICOAGULATION MANAGEMENT     Shira Rodriguez 63 year old female is on warfarin with subtherapeutic INR result. (Goal INR 2.0-3.0)    Recent labs: (last 7 days)     02/23/24  1458   INR 1.6*       ASSESSMENT     Source(s): Chart Review and Patient/Caregiver Call     Warfarin doses taken: Warfarin taken as instructed  Diet: No new diet changes identified  Medication/supplement changes: Shira reports she was started on another prednisone taper 2/16/24; has not been taking tylenol  New illness, injury, or hospitalization: has had cold symptoms x 3 days  Signs or symptoms of bleeding or clotting: No  Previous result: Therapeutic last 2(+) visits  Additional findings: None       PLAN     Recommended plan for no diet, medication or health factor changes affecting INR     Dosing Instructions: booster dose then Increase your warfarin dose (4% change) with next INR in 1 week       Summary  As of 2/23/2024      Full warfarin instructions:  2/23: 12.5 mg; 2/24: 10 mg; Otherwise 7.5 mg every Sun, Thu; 10 mg all other days   Next INR check:  3/1/2024               Telephone call with Shira who agrees to plan and repeated back plan correctly    Lab visit scheduled    Education provided:   Contact [phone] with any changes, questions or concerns.     Plan made per [facility] anticoagulation protocol    Tea Deng RN  Anticoagulation Clinic  2/23/2024    _______________________________________________________________________     Anticoagulation Episode Summary       Current INR goal:  2.0-3.0   TTR:  48.3% (1 y)   Target end date:  Indefinite   Send INR reminders to:  [facility]    Indications    Pulmonary emboli (H) [I26.99]  Long term current use of anticoagulant therapy [Z79.01]             Comments:               Anticoagulation Care Providers       Provider Role Specialty Phone number    Anjel Busby MD Referring Family Medicine [phone]

## 2024-02-28 ENCOUNTER — MYC MEDICAL ADVICE (OUTPATIENT)
Dept: RHEUMATOLOGY | Facility: CLINIC | Age: 64
End: 2024-02-28
Payer: COMMERCIAL

## 2024-02-28 DIAGNOSIS — M06.00 RHEUMATOID ARTHRITIS WITH NEGATIVE RHEUMATOID FACTOR, INVOLVING UNSPECIFIED SITE (H): ICD-10-CM

## 2024-02-28 RX ORDER — HYDROXYCHLOROQUINE SULFATE 200 MG/1
200 TABLET, FILM COATED ORAL 2 TIMES DAILY
Qty: 60 TABLET | Refills: 0 | Status: SHIPPED | OUTPATIENT
Start: 2024-02-28 | End: 2024-02-29

## 2024-02-28 NOTE — TELEPHONE ENCOUNTER
hydroxychloroquine (PLAQUENIL) 200 MG tablet 60 tablet 3 10/26/2023     Last Office Visit: 1/26/24  Future Office visit:  6/7/24  Eye exam: No exam found    Creatinine   Date Value Ref Range Status   01/26/2024 1.91 (H) 0.51 - 0.95 mg/dL Final   06/16/2021 0.74 0.52 - 1.04 mg/dL Final         Routing refill request to provider for review/approval because:  Abnormal lab and no eye exam    Paulette Finch RN  P Red Flag Triage/MRT

## 2024-02-29 ENCOUNTER — TELEPHONE (OUTPATIENT)
Dept: RHEUMATOLOGY | Facility: CLINIC | Age: 64
End: 2024-02-29
Payer: COMMERCIAL

## 2024-02-29 DIAGNOSIS — M06.00 RHEUMATOID ARTHRITIS WITH NEGATIVE RHEUMATOID FACTOR, INVOLVING UNSPECIFIED SITE (H): ICD-10-CM

## 2024-02-29 RX ORDER — HYDROXYCHLOROQUINE SULFATE 200 MG/1
200 TABLET, FILM COATED ORAL 2 TIMES DAILY
Qty: 60 TABLET | Refills: 0 | Status: SHIPPED | OUTPATIENT
Start: 2024-02-29 | End: 2024-04-19

## 2024-02-29 RX ORDER — HYDROXYCHLOROQUINE SULFATE 200 MG/1
200 TABLET, FILM COATED ORAL 2 TIMES DAILY
Qty: 60 TABLET | Refills: 0 | Status: SHIPPED | OUTPATIENT
Start: 2024-02-29 | End: 2024-02-29

## 2024-02-29 NOTE — TELEPHONE ENCOUNTER
Changed pharmacy location of hydroxychloroquine to correct [facility] location.    KRANTHI GarciaN, RN  RN Care Coordinator Rheumatology

## 2024-02-29 NOTE — CONFIDENTIAL NOTE
Change in pharmacies per patient request for hydroxychloroquine.    Ginna Vanegas, BSN, RN  RN Care Coordinator Rheumatology

## 2024-03-01 ENCOUNTER — ANTICOAGULATION THERAPY VISIT (OUTPATIENT)
Dept: ANTICOAGULATION | Facility: CLINIC | Age: 64
End: 2024-03-01

## 2024-03-01 ENCOUNTER — LAB (OUTPATIENT)
Dept: LAB | Facility: CLINIC | Age: 64
End: 2024-03-01
Payer: COMMERCIAL

## 2024-03-01 DIAGNOSIS — Z79.01 LONG TERM CURRENT USE OF ANTICOAGULANT THERAPY: ICD-10-CM

## 2024-03-01 DIAGNOSIS — I26.99 PULMONARY EMBOLI (H): Primary | ICD-10-CM

## 2024-03-01 DIAGNOSIS — I26.99 PULMONARY EMBOLI (H): ICD-10-CM

## 2024-03-01 LAB — INR BLD: 2.4 (ref 0.9–1.1)

## 2024-03-01 PROCEDURE — 85610 PROTHROMBIN TIME: CPT

## 2024-03-01 PROCEDURE — 36415 COLL VENOUS BLD VENIPUNCTURE: CPT

## 2024-03-01 NOTE — TELEPHONE ENCOUNTER
STAR APPROVED    Medication: RINVOQ 15 MG PO TB24  Amount: $    Foundation Name: [facility] Effective Date: 3/1/2024  Foundation Expiration Date: 12/31/2024  Additional Information: Filling at [facility]  Patient Notified: YES

## 2024-03-01 NOTE — PROGRESS NOTES
ANTICOAGULATION MANAGEMENT     Shira Rodriguez 63 year old female is on warfarin with therapeutic INR result. (Goal INR 2.0-3.0)    Recent labs: (last 7 days)     03/01/24  1443   INR 2.4*       ASSESSMENT     Source(s): Chart Review  Previous INR was Subtherapeutic  Medication, diet, health changes since last INR chart reviewed; none identified  I left a detailed voicemail with the orders reflected in flowsheet. I have also requested a call back if there have been any missed doses, concerns, illness, fever, or if there have been any changes in medications, activity level, or diet         PLAN     Recommended plan for no diet, medication or health factor changes affecting INR     Dosing Instructions: Continue your current warfarin dose with next INR in 1 week       Summary  As of 3/1/2024      Full warfarin instructions:  7.5 mg every Sun, Thu; 10 mg all other days   Next INR check:  3/8/2024               Detailed voice message left for Shira with dosing instructions and follow up date.     Contact [phone] to schedule and with any changes, questions or concerns.     Education provided:   Please call back if any changes to your diet, medications or how you've been taking warfarin    Plan made per [facility] anticoagulation protocol    Vickie Gabriel, RN  Anticoagulation Clinic  3/1/2024    _______________________________________________________________________     Anticoagulation Episode Summary       Current INR goal:  2.0-3.0   TTR:  49.1% (1 y)   Target end date:  Indefinite   Send INR reminders to:  [facility]    Indications    Pulmonary emboli (H) [I26.99]  Long term current use of anticoagulant therapy [Z79.01]             Comments:               Anticoagulation Care Providers       Provider Role Specialty Phone number    Anjel Busby MD Referring Family Medicine [phone]

## 2024-03-04 DIAGNOSIS — M06.00 RHEUMATOID ARTHRITIS WITH NEGATIVE RHEUMATOID FACTOR, INVOLVING UNSPECIFIED SITE (H): ICD-10-CM

## 2024-03-08 ENCOUNTER — LAB (OUTPATIENT)
Dept: LAB | Facility: CLINIC | Age: 64
End: 2024-03-08
Payer: COMMERCIAL

## 2024-03-08 ENCOUNTER — ANTICOAGULATION THERAPY VISIT (OUTPATIENT)
Dept: ANTICOAGULATION | Facility: CLINIC | Age: 64
End: 2024-03-08

## 2024-03-08 DIAGNOSIS — I26.99 PULMONARY EMBOLI (H): ICD-10-CM

## 2024-03-08 DIAGNOSIS — Z79.01 LONG TERM CURRENT USE OF ANTICOAGULANT THERAPY: ICD-10-CM

## 2024-03-08 DIAGNOSIS — I26.99 PULMONARY EMBOLI (H): Primary | ICD-10-CM

## 2024-03-08 LAB — INR BLD: 2.1 (ref 0.9–1.1)

## 2024-03-08 PROCEDURE — 85610 PROTHROMBIN TIME: CPT

## 2024-03-08 PROCEDURE — 36416 COLLJ CAPILLARY BLOOD SPEC: CPT

## 2024-03-08 RX ORDER — METHYLPREDNISOLONE 4 MG/1
TABLET ORAL
Qty: 80 TABLET | Refills: 0 | Status: SHIPPED | OUTPATIENT
Start: 2024-03-08 | End: 2024-04-12

## 2024-03-08 NOTE — TELEPHONE ENCOUNTER
methylPREDNISolone (MEDROL) 4 MG tablet   80 tablet 1 1/26/2024       Last Office Visit : 1-  Future Office visit:  6-7-2024

## 2024-03-08 NOTE — PROGRESS NOTES
ANTICOAGULATION MANAGEMENT     Shira Rodriguez 63 year old female is on warfarin with therapeutic INR result. (Goal INR 2.0-3.0)    Recent labs: (last 7 days)     03/08/24  1437   INR 2.1*       ASSESSMENT     Source(s): Chart Review and Patient/Caregiver Call     Warfarin doses taken: Missed dose(s) may be affecting INR  Diet: No new diet changes identified  Medication/supplement changes: None noted  New illness, injury, or hospitalization: No  Signs or symptoms of bleeding or clotting: No  Previous result: Therapeutic last visit; previously outside of goal range  Additional findings:  Shira is able to recheck in 3 weeks instead of 2       PLAN     Recommended plan for temporary change(s) affecting INR     Dosing Instructions: Continue your current warfarin dose with next INR in 3 weeks       Summary  As of 3/8/2024      Full warfarin instructions:  7.5 mg every Sun, Thu; 10 mg all other days   Next INR check:  3/29/2024               Telephone call with Shira who agrees to plan and repeated back plan correctly    Lab visit scheduled    Education provided:   Taking warfarin: Importance of taking warfarin as instructed  Goal range and lab monitoring: goal range and significance of current result and Importance of following up at instructed interval  Contact [phone] with any changes, questions or concerns.     Plan made per [facility] anticoagulation protocol    YUNIER DAVIDSON RN  Anticoagulation Clinic  3/8/2024    _______________________________________________________________________     Anticoagulation Episode Summary       Current INR goal:  2.0-3.0   TTR:  49.1% (1 y)   Target end date:  Indefinite   Send INR reminders to:  [facility]    Indications    Pulmonary emboli (H) [I26.99]  Long term current use of anticoagulant therapy [Z79.01]             Comments:               Anticoagulation Care Providers       Provider Role Specialty Phone number    Anjel Busby MD Referring Family Medicine  [phone]

## 2024-03-29 ENCOUNTER — ANTICOAGULATION THERAPY VISIT (OUTPATIENT)
Dept: ANTICOAGULATION | Facility: CLINIC | Age: 64
End: 2024-03-29

## 2024-03-29 ENCOUNTER — LAB (OUTPATIENT)
Dept: LAB | Facility: CLINIC | Age: 64
End: 2024-03-29
Payer: COMMERCIAL

## 2024-03-29 DIAGNOSIS — I26.99 PULMONARY EMBOLI (H): ICD-10-CM

## 2024-03-29 DIAGNOSIS — I26.99 PULMONARY EMBOLI (H): Primary | ICD-10-CM

## 2024-03-29 DIAGNOSIS — Z79.01 LONG TERM CURRENT USE OF ANTICOAGULANT THERAPY: ICD-10-CM

## 2024-03-29 LAB — INR BLD: 3 (ref 0.9–1.1)

## 2024-03-29 PROCEDURE — 36416 COLLJ CAPILLARY BLOOD SPEC: CPT

## 2024-03-29 PROCEDURE — 85610 PROTHROMBIN TIME: CPT

## 2024-03-29 NOTE — PROGRESS NOTES
ANTICOAGULATION MANAGEMENT     Shira Rodriguez 63 year old female is on warfarin with therapeutic INR result. (Goal INR 2.0-3.0)    Recent labs: (last 7 days)     03/29/24  1507   INR 3.0*       ASSESSMENT     Source(s): Chart Review and Patient/Caregiver Call     Warfarin doses taken: Warfarin taken as instructed  Diet: No new diet changes identified  Medication/supplement changes: None noted  New illness, injury, or hospitalization: No  Signs or symptoms of bleeding or clotting: No  Previous result: Therapeutic last 2(+) visits  Additional findings: None       PLAN     Recommended plan for no diet, medication or health factor changes affecting INR     Dosing Instructions: Continue your current warfarin dose with next INR in 4 weeks       Summary  As of 3/29/2024      Full warfarin instructions:  7.5 mg every Sun, Thu; 10 mg all other days   Next INR check:  4/26/2024               Telephone call with Shira who verbalizes understanding and agrees to plan    Lab visit scheduled    Education provided:   Contact [phone] with any changes, questions or concerns.     Plan made per [facility] anticoagulation protocol    Rachna Rivera RN  Anticoagulation Clinic  3/29/2024    _______________________________________________________________________     Anticoagulation Episode Summary       Current INR goal:  2.0-3.0   TTR:  53.9% (1 y)   Target end date:  Indefinite   Send INR reminders to:  [facility]    Indications    Pulmonary emboli (H) [I26.99]  Long term current use of anticoagulant therapy [Z79.01]             Comments:               Anticoagulation Care Providers       Provider Role Specialty Phone number    Anjel Busby MD Referring Family Medicine [phone]

## 2024-04-02 ENCOUNTER — TELEPHONE (OUTPATIENT)
Dept: RHEUMATOLOGY | Facility: CLINIC | Age: 64
End: 2024-04-02
Payer: COMMERCIAL

## 2024-04-02 NOTE — TELEPHONE ENCOUNTER
Patient confirmed scheduled appointment:  Date: April 10th, 2024  Time: 2pm  Visit type: MTM Return Phone  Provider: Mindi Murry  Location: [facility]  Testing/imaging: NA  Additional notes: NA

## 2024-04-04 DIAGNOSIS — I27.82 CHRONIC PULMONARY EMBOLISM, UNSPECIFIED PULMONARY EMBOLISM TYPE, UNSPECIFIED WHETHER ACUTE COR PULMONALE PRESENT (H): ICD-10-CM

## 2024-04-04 RX ORDER — WARFARIN SODIUM 5 MG/1
TABLET ORAL
Qty: 180 TABLET | Refills: 1 | Status: SHIPPED | OUTPATIENT
Start: 2024-04-04 | End: 2024-09-11

## 2024-04-05 ENCOUNTER — TRANSFERRED RECORDS (OUTPATIENT)
Dept: HEALTH INFORMATION MANAGEMENT | Facility: CLINIC | Age: 64
End: 2024-04-05

## 2024-04-10 ENCOUNTER — VIRTUAL VISIT (OUTPATIENT)
Dept: RHEUMATOLOGY | Facility: CLINIC | Age: 64
End: 2024-04-10
Payer: COMMERCIAL

## 2024-04-10 DIAGNOSIS — M05.79 RHEUMATOID ARTHRITIS INVOLVING MULTIPLE SITES WITH POSITIVE RHEUMATOID FACTOR (H): Primary | ICD-10-CM

## 2024-04-10 DIAGNOSIS — Z71.85 VACCINE COUNSELING: ICD-10-CM

## 2024-04-10 NOTE — Clinical Note
4/10/2024       RE: Shira Rodriguez  [address]  [place] [address]     Dear Colleague,    Thank you for referring your patient, Shira Rodriguez, to the [facility]OLOG[facility] [place] at . Please see a copy of my visit note below.    Medication Therapy Management (MTM) Encounter    ASSESSMENT:                            Medication Adherence/Access: No issues identified.    Rheumatoid arthritis: ***    Vaccine counseling: Up to date on vaccines per ACIP recommendations.    Hyperlipidemia: Patient is non-adherent to statin, LDL now above goal of < 70 mg/dL and was previously well controlled. Would benefit from restarting statin therapy.    PLAN:                            ***    Follow-up: 3 months for Rinvoq efficacy. Will reorder statin pending primary provider response.    SUBJECTIVE/OBJECTIVE:                          Shira Rodriguez is a 63 year old female called for a follow up visit from 1/31/24. She was referred to me from Everett Austin MD.    Reason for visit: Rinvoq follow up.    Allergies/ADRs: Reviewed in chart  Past Medical History: Reviewed in chart  Tobacco: She reports that she quit smoking about 4 years ago. Her smoking use included cigarettes. She started smoking about 42 years ago. She has never used smokeless tobacco.  Alcohol: not assessed today    Medication Adherence/Access: No issues identified.    Rheumatoid arthritis:  Rinvoq 15 mg daily  Hydroxychloroquine 200 mg twice daily  Acetaminophen 650 mg every 6 hours as needed    Patient started Rinvoq ~4 weeks ago.    Prior to Rinvoq start, patient with severe joint pain mostly in hands, fingers, feet, wrists, and elbows as well as significant morning stiffness. Previously had excellent response to methotrexate but had to stop it due to myelosuppression and mouth sores. Did also have lengthy hospitalization likely related to myelosuppression  and will be avoiding methotrexate indefinitely. At last visit started hydroxychloroquine and methylprednisolone burst/taper. Side effects/benefit since starting Rinvoq? ***    She has not started Rinvoq yet    Started Rinvoq about a month ago, has not noticed much benefit yet but has not had any side effects. Still having pain and stiffness in hands, fingers, feet, wrists, shoulders, and elbows as well as significant morning stiffness. No longer on prednisone/methylprednisolone. *** Needs a hydroxychloroquine refill, she has had an eye exam (asked her eye doctor to fax). Had eye exam [facility] in [place]. No concerns on eye exam.    Vaccine counseling: Patient is open to receiving recommended vaccines.    Immunization History   Administered Date(s) Administered    COVID-19 Bivalent 12+ (Pfizer) 12/06/2022, 05/24/2023    COVID-19 MONOVALENT 12+ (Pfizer) 03/12/2021, 04/02/2021, 12/30/2021    COVID-19 Monovalent 12+ (Pfizer 2022) 05/24/2022    Influenza (IIV3) PF 10/04/2011, 10/03/2012, 09/24/2016, 10/16/2017    Influenza Vaccine 18-64 (Flublok) 09/29/2019, 10/12/2022    Influenza Vaccine >6 months,quad, PF 10/03/2018, 12/30/2021    Pneumo Conj 13-V (2010&after) 08/28/2017, 05/23/2019    Pneumococcal 23 valent 12/04/2017    TDAP (Adacel,Boostrix) 07/16/2010    TDAP Vaccine (Boostrix) 08/21/2015    Zoster recombinant adjuvanted (SHINGRIX) 06/04/2018, 05/23/2019      Hyperlipidemia: Was not taking atorvastatin at last visit and did not remember why she stopped it. She doesn't think it was due to side effects such as muscle pain. Writer did not hear back from provider re: restarting, would she be willing to discuss with him? ***    Recent Labs   Lab Test 07/20/23  1446 02/01/23  0734   CHOL 191 84   HDL 59 24*   * 45   TRIG 89 76      Today's Vitals: There were no vitals taken for this visit.  ----------------    I spent *** minutes with this patient today. All changes were made via collaborative practice  agreement with Everett Austin. A copy of the visit note was provided to the patient's provider(s).    A summary of these recommendations was sent via Outlisten.    Mindi Murry, PharmD  Medication Therapy Management Pharmacist  [facility]     Telemedicine Visit Details  Type of service:  Telephone visit  Start Time: 1400  End Time: ***     Medication Therapy Recommendations  No medication therapy recommendations to display      Again, thank you for allowing me to participate in the care of your patient.      Sincerely,    Mindi Murry, [facility]

## 2024-04-10 NOTE — Clinical Note
Hi Dr. Austin! Shira is tolerating the Rinvoq, had quite a delayed start so has only been on it for about four weeks. She is in need of a hydroxychloroquine refill, has now had her eye exam done and said there were no issues - are you okay with me sending that through?

## 2024-04-10 NOTE — PROGRESS NOTES
Medication Therapy Management (MTM) Encounter    ASSESSMENT:                            Medication Adherence/Access: No issues identified.    Rheumatoid arthritis: Patient has only been on Rinvoq for ~4 weeks and therefore we are unable to determine if it is effective yet, but no significant side effects noted. She has had her eye exam completed for hydroxychloroquine and is in need of a refill. Would benefit from continuing current therapy and reassessing effectiveness after 12 weeks on Rinvoq.    PLAN:                            Continue Rinvoq 15 mg daily. Report any significant side effects, infections, or planned surgeries to rheumatology.  Will confirm hydroxychloroquine refill is okay with provider and send per patient request.    Follow-up: 3 months for Rinvoq efficacy. Will reorder statin pending primary provider response.    SUBJECTIVE/OBJECTIVE:                          Shira Rodriguez is a 63 year old female called for a follow up visit from 1/31/24. She was referred to me from Everett Austin MD.    Reason for visit: Rinvoq follow up.    Allergies/ADRs: Reviewed in chart  Past Medical History: Reviewed in chart  Tobacco: She reports that she quit smoking about 4 years ago. Her smoking use included cigarettes. She started smoking about 42 years ago. She has never used smokeless tobacco.  Alcohol: not assessed today    Medication Adherence/Access: No issues identified.    Rheumatoid arthritis:  Rinvoq 15 mg daily  Hydroxychloroquine 200 mg twice daily  Acetaminophen 650 mg every 6 hours as needed    Patient reports she started Rinvoq ~4 weeks ago, prior to start had severe pain in hands, fingers, feet, wrists, and elbows as well as significant morning stiffness. She has not noticed much benefit at this point but reports no significant side effects. Has tapered completely off of methylprednisolone. Previously had excellent response to methotrexate but had to stop it due to myelosuppression and mouth sores.  Did also have lengthy hospitalization likely related to myelosuppression and will be avoiding methotrexate indefinitely.    Patient notes she is in need of a hydroxychloroquine refill, previously denied as she needed an eye exam. She has had this done with [facility] in [place]. Notes there were no concerns.    Today's Vitals: There were no vitals taken for this visit.  ----------------    I spent 30 minutes with this patient today. All changes were made via collaborative practice agreement with Everett Austin. A copy of the visit note was provided to the patient's provider(s).    A summary of these recommendations was sent via [employer].    Mindi Murry, PharmD  Medication Therapy Management Pharmacist  [facility]     Telemedicine Visit Details  Type of service:  Telephone visit  Start Time: 1400  End Time: 1430     Medication Therapy Recommendations  No medication therapy recommendations to display

## 2024-04-12 NOTE — PATIENT INSTRUCTIONS
"Recommendations from today's MTM visit:                                                      Continue Rinvoq 15 mg daily. Report any significant side effects, infections, or planned surgeries to rheumatology.  Will confirm hydroxychloroquine refill is okay with provider and send per patient request.    Follow-up: 3 months for Rinvoq efficacy. Will reorder statin pending primary provider response.    It was great speaking with you today.  I value your experience and would be very thankful for your time in providing feedback in our clinic survey. In the next few days, you may receive an email or text message from [employer] with a link to a survey related to your  clinical pharmacist.\"     To schedule another MTM appointment, please call the clinic directly or you may call the MTM scheduling line at [phone] or toll-free at [phone].     My Clinical Pharmacist's contact information:                                                      Please feel free to contact me with any questions or concerns you have.      Mindi Murry, PharmD  Medication Therapy Management Pharmacist  [facility]    "

## 2024-04-17 ENCOUNTER — MYC MEDICAL ADVICE (OUTPATIENT)
Dept: RHEUMATOLOGY | Facility: CLINIC | Age: 64
End: 2024-04-17
Payer: COMMERCIAL

## 2024-04-19 DIAGNOSIS — M06.00 RHEUMATOID ARTHRITIS WITH NEGATIVE RHEUMATOID FACTOR, INVOLVING UNSPECIFIED SITE (H): ICD-10-CM

## 2024-04-19 RX ORDER — HYDROXYCHLOROQUINE SULFATE 200 MG/1
200 TABLET, FILM COATED ORAL 2 TIMES DAILY
Qty: 60 TABLET | Refills: 0 | Status: SHIPPED | OUTPATIENT
Start: 2024-04-19 | End: 2024-06-07

## 2024-04-19 NOTE — PROGRESS NOTES
Ordered refill per authorization of Everett Austin MD. Will continue to watch for notes from most recent eye exam for future refills.     Jean Pierre Dodd, PharmD  Medication Therapy Management Pharmacist  [facility]

## 2024-04-23 ENCOUNTER — ANTICOAGULATION THERAPY VISIT (OUTPATIENT)
Dept: ANTICOAGULATION | Facility: CLINIC | Age: 64
End: 2024-04-23

## 2024-04-23 ENCOUNTER — LAB (OUTPATIENT)
Dept: LAB | Facility: CLINIC | Age: 64
End: 2024-04-23
Payer: COMMERCIAL

## 2024-04-23 DIAGNOSIS — I26.99 PULMONARY EMBOLI (H): ICD-10-CM

## 2024-04-23 DIAGNOSIS — Z79.01 LONG TERM CURRENT USE OF ANTICOAGULANT THERAPY: ICD-10-CM

## 2024-04-23 DIAGNOSIS — I26.99 PULMONARY EMBOLI (H): Primary | ICD-10-CM

## 2024-04-23 LAB — INR BLD: 1.7 (ref 0.9–1.1)

## 2024-04-23 PROCEDURE — 36416 COLLJ CAPILLARY BLOOD SPEC: CPT

## 2024-04-23 PROCEDURE — 85610 PROTHROMBIN TIME: CPT

## 2024-04-23 NOTE — PROGRESS NOTES
ANTICOAGULATION MANAGEMENT     Shira Rodriguez 64 year old female is on warfarin with subtherapeutic INR result. (Goal INR 2.0-3.0)    Recent labs: (last 7 days)     04/23/24  1504   INR 1.7*       ASSESSMENT     Source(s): Chart Review and Patient/Caregiver Call     Warfarin doses taken: Warfarin taken as instructed  Diet: No new diet changes identified  Medication/supplement changes: None noted  New illness, injury, or hospitalization: No  Signs or symptoms of bleeding or clotting: No  Previous result: Therapeutic last 2(+) visits  Additional findings: None       PLAN     Recommended plan for no diet, medication or health factor changes affecting INR     Dosing Instructions: Continue your current warfarin dose with next INR in 2 weeks       Summary  As of 4/23/2024      Full warfarin instructions:  7.5 mg every Sun, Thu; 10 mg all other days   Next INR check:  5/7/2024               Telephone call with Shira who verbalizes understanding and agrees to plan and who agrees to plan and repeated back plan correctly    Lab visit scheduled    Education provided:   Taking warfarin: Importance of taking warfarin as instructed  Goal range and lab monitoring: goal range and significance of current result and Importance of therapeutic range    Plan made per [facility] anticoagulation protocol    Mariah Sellers RN  Anticoagulation Clinic  4/23/2024    _______________________________________________________________________     Anticoagulation Episode Summary       Current INR goal:  2.0-3.0   TTR:  55.3% (1 y)   Target end date:  Indefinite   Send INR reminders to:  [facility]    Indications    Pulmonary emboli (H) [I26.99]  Long term current use of anticoagulant therapy [Z79.01]             Comments:               Anticoagulation Care Providers       Provider Role Specialty Phone number    Anjel Busby MD Referring Family Medicine [phone]

## 2024-05-07 DIAGNOSIS — I46.9 CARDIAC ARREST (H): ICD-10-CM

## 2024-05-08 RX ORDER — LISINOPRIL 5 MG/1
5 TABLET ORAL DAILY
Qty: 90 TABLET | Refills: 0 | Status: SHIPPED | OUTPATIENT
Start: 2024-05-08 | End: 2024-06-13

## 2024-05-08 NOTE — TELEPHONE ENCOUNTER
Left Voicemail (1st Attempt) and Sent Mychart (1st Attempt) for the patient to call back and schedule the following:    Appointment type: rtn or epp  Provider: pcp  Return date: soonest avail    Additonal Notes: med refills

## 2024-05-09 ENCOUNTER — ANTICOAGULATION THERAPY VISIT (OUTPATIENT)
Dept: ANTICOAGULATION | Facility: CLINIC | Age: 64
End: 2024-05-09

## 2024-05-09 ENCOUNTER — LAB (OUTPATIENT)
Dept: LAB | Facility: CLINIC | Age: 64
End: 2024-05-09
Payer: COMMERCIAL

## 2024-05-09 DIAGNOSIS — I26.99 PULMONARY EMBOLI (H): ICD-10-CM

## 2024-05-09 DIAGNOSIS — Z79.01 LONG TERM CURRENT USE OF ANTICOAGULANT THERAPY: ICD-10-CM

## 2024-05-09 DIAGNOSIS — I26.99 PULMONARY EMBOLI (H): Primary | ICD-10-CM

## 2024-05-09 LAB — INR BLD: 1.9 (ref 0.9–1.1)

## 2024-05-09 PROCEDURE — 36416 COLLJ CAPILLARY BLOOD SPEC: CPT

## 2024-05-09 PROCEDURE — 85610 PROTHROMBIN TIME: CPT

## 2024-05-09 NOTE — PROGRESS NOTES
ANTICOAGULATION MANAGEMENT     Shira Rodriguez 64 year old female is on warfarin with subtherapeutic INR result. (Goal INR 2.0-3.0)    Recent labs: (last 7 days)     05/09/24  1500   INR 1.9*       ASSESSMENT     Source(s): Chart Review and Patient/Caregiver Call     Warfarin doses taken: Missed dose(s) may be affecting INR  Diet: No new diet changes identified  Medication/supplement changes: None noted  New illness, injury, or hospitalization: No  Signs or symptoms of bleeding or clotting: No  Previous result: Subtherapeutic  Additional findings: None       PLAN     Recommended plan for temporary change(s) affecting INR     Dosing Instructions: booster dose then continue your current warfarin dose with next INR in 2 weeks       Summary  As of 5/9/2024      Full warfarin instructions:  5/9: 10 mg; Otherwise 7.5 mg every Sun, Thu; 10 mg all other days   Next INR check:  5/23/2024               Telephone call with Shira who verbalizes understanding and agrees to plan    Lab visit scheduled    Education provided:   Goal range and lab monitoring: goal range and significance of current result    Plan made per [facility] anticoagulation protocol    Mouna Carolina RN  Anticoagulation Clinic  5/9/2024    _______________________________________________________________________     Anticoagulation Episode Summary       Current INR goal:  2.0-3.0   TTR:  50.9% (1 y)   Target end date:  Indefinite   Send INR reminders to:  [facility]    Indications    Pulmonary emboli (H) [I26.99]  Long term current use of anticoagulant therapy [Z79.01]             Comments:               Anticoagulation Care Providers       Provider Role Specialty Phone number    Anjel Busby MD Referring Family Medicine [phone]

## 2024-05-13 NOTE — TELEPHONE ENCOUNTER
Left Voicemail (2nd Attempt)  for the patient to call back and schedule the following:     Appointment type: rtn or epp  Provider: pcp  Return date: soonest avail     Additonal Notes: med refills

## 2024-05-23 ENCOUNTER — ANTICOAGULATION THERAPY VISIT (OUTPATIENT)
Dept: ANTICOAGULATION | Facility: CLINIC | Age: 64
End: 2024-05-23

## 2024-05-23 ENCOUNTER — LAB (OUTPATIENT)
Dept: LAB | Facility: CLINIC | Age: 64
End: 2024-05-23
Payer: COMMERCIAL

## 2024-05-23 DIAGNOSIS — I26.99 PULMONARY EMBOLI (H): ICD-10-CM

## 2024-05-23 DIAGNOSIS — I26.99 PULMONARY EMBOLI (H): Primary | ICD-10-CM

## 2024-05-23 DIAGNOSIS — Z79.01 LONG TERM CURRENT USE OF ANTICOAGULANT THERAPY: ICD-10-CM

## 2024-05-23 LAB — INR BLD: 1.3 (ref 0.9–1.1)

## 2024-05-23 PROCEDURE — 85610 PROTHROMBIN TIME: CPT

## 2024-05-23 PROCEDURE — 36416 COLLJ CAPILLARY BLOOD SPEC: CPT

## 2024-05-23 NOTE — PROGRESS NOTES
ANTICOAGULATION MANAGEMENT     Shira PETER Michael 64 year old female is on warfarin with subtherapeutic INR result. (Goal INR 2.0-3.0)    Recent labs: (last 7 days)     05/23/24  1510   INR 1.3*       ASSESSMENT     Warfarin Lab Questionnaire    Warfarin Doses Last 7 Days      5/22/2024     3:49 PM   Dose in Tablet or Mg   TAB or MG? milligram (mg)     Pt Rptd Dose SUNDAY MONDAY TUESDAY WED THURS FRIDAY SATURDAY 5/22/2024   3:49 PM 7.5 10 10 10 7.5 10 10         5/22/2024   Warfarin Lab Questionnaire   Missed doses within past 14 days? No   Changes in diet or alcohol within past 14 days? No   Medication changes since last result? No   Injuries or illness since last result? No   New shortness of breath, severe headaches or sudden changes in vision since last result? No   Abnormal bleeding since last result? No   Upcoming surgery, procedure? No   Best number to call with results? [phone]     Previous result: Subtherapeutic  Additional findings: None       PLAN     Recommended plan for no diet, medication or health factor changes affecting INR     Dosing Instructions: booster dose then Increase your warfarin dose (15% change) with next INR in 1 week       Summary  As of 5/23/2024      Full warfarin instructions:  5/23: 15 mg; Otherwise 12.5 mg every Sun, Thu; 10 mg all other days   Next INR check:  5/30/2024               Telephone call with Shira who verbalizes understanding and agrees to plan and who agrees to plan and repeated back plan correctly    Lab visit scheduled    Education provided:   Taking warfarin: Importance of taking warfarin as instructed  Goal range and lab monitoring: goal range and significance of current result and Importance of therapeutic range    Plan made per [facility] anticoagulation protocol    Mariah Sellers, RN  Anticoagulation Clinic  5/23/2024    _______________________________________________________________________     Anticoagulation Episode Summary       Current INR goal:  2.0-3.0    TTR:  47.0% (1 y)   Target end date:  Indefinite   Send INR reminders to:  [facility]    Indications    Pulmonary emboli (H) [I26.99]  Long term current use of anticoagulant therapy [Z79.01]             Comments:               Anticoagulation Care Providers       Provider Role Specialty Phone number    Anjel Busby MD Referring Family Medicine [phone]

## 2024-05-31 ENCOUNTER — ANTICOAGULATION THERAPY VISIT (OUTPATIENT)
Dept: ANTICOAGULATION | Facility: CLINIC | Age: 64
End: 2024-05-31

## 2024-05-31 ENCOUNTER — LAB (OUTPATIENT)
Dept: LAB | Facility: CLINIC | Age: 64
End: 2024-05-31
Payer: COMMERCIAL

## 2024-05-31 DIAGNOSIS — I26.99 PULMONARY EMBOLI (H): ICD-10-CM

## 2024-05-31 DIAGNOSIS — Z79.01 LONG TERM CURRENT USE OF ANTICOAGULANT THERAPY: ICD-10-CM

## 2024-05-31 DIAGNOSIS — I26.99 PULMONARY EMBOLI (H): Primary | ICD-10-CM

## 2024-05-31 LAB — INR BLD: 1.5 (ref 0.9–1.1)

## 2024-05-31 PROCEDURE — 85610 PROTHROMBIN TIME: CPT

## 2024-05-31 PROCEDURE — 36416 COLLJ CAPILLARY BLOOD SPEC: CPT

## 2024-05-31 NOTE — PROGRESS NOTES
ANTICOAGULATION MANAGEMENT     Shira Rodriguez 64 year old female is on warfarin with subtherapeutic INR result. (Goal INR 2.0-3.0)    Recent labs: (last 7 days)     05/31/24  1435   INR 1.5*       ASSESSMENT     Source(s): Chart Review and Patient/Caregiver Call     Warfarin doses taken: Warfarin taken as instructed  Diet: No new diet changes identified  Medication/supplement changes: None noted  New illness, injury, or hospitalization: No  Signs or symptoms of bleeding or clotting: No  Previous result: Subtherapeutic  Additional findings: recheck in 5-7 days recommended, Shira stated that it is difficult for her due to transportation issues.     Booster dose with 15% maintenance dose increase last encounter     PLAN     Recommended plan for temporary change(s) and ongoing change(s) affecting INR     Dosing Instructions: Increase your warfarin dose (10% change) with next INR in 1 week       Summary  As of 5/31/2024      Full warfarin instructions:  10 mg every Sun, Thu; 12.5 mg all other days   Next INR check:  6/7/2024               Telephone call with Shira who agrees to plan and repeated back plan correctly    Patient offered & declined to schedule next visit    Education provided:   Goal range and lab monitoring: goal range and significance of current result and Importance of following up at instructed interval  Contact [phone] with any changes, questions or concerns.     Plan made per [facility] anticoagulation protocol    YUNIER DAVIDSNO RN  Anticoagulation Clinic  5/31/2024    _______________________________________________________________________     Anticoagulation Episode Summary       Current INR goal:  2.0-3.0   TTR:  46.5% (1 y)   Target end date:  Indefinite   Send INR reminders to:  [facility]    Indications    Pulmonary emboli (H) [I26.99]  Long term current use of anticoagulant therapy [Z79.01]             Comments:               Anticoagulation Care Providers       Provider Role Specialty  Phone number    Anjel uBsby MD Referring Family Medicine [phone]

## 2024-06-06 ENCOUNTER — TELEPHONE (OUTPATIENT)
Dept: RHEUMATOLOGY | Facility: CLINIC | Age: 64
End: 2024-06-06
Payer: COMMERCIAL

## 2024-06-07 ENCOUNTER — VIRTUAL VISIT (OUTPATIENT)
Dept: RHEUMATOLOGY | Facility: CLINIC | Age: 64
End: 2024-06-07
Attending: INTERNAL MEDICINE
Payer: COMMERCIAL

## 2024-06-07 DIAGNOSIS — M06.00 RHEUMATOID ARTHRITIS WITH NEGATIVE RHEUMATOID FACTOR, INVOLVING UNSPECIFIED SITE (H): Primary | ICD-10-CM

## 2024-06-07 DIAGNOSIS — M06.9 RHEUMATOID ARTHRITIS, INVOLVING UNSPECIFIED SITE, UNSPECIFIED WHETHER RHEUMATOID FACTOR PRESENT (H): ICD-10-CM

## 2024-06-07 PROCEDURE — 99214 OFFICE O/P EST MOD 30 MIN: CPT | Mod: 95 | Performed by: INTERNAL MEDICINE

## 2024-06-07 PROCEDURE — G2211 COMPLEX E/M VISIT ADD ON: HCPCS | Performed by: INTERNAL MEDICINE

## 2024-06-07 RX ORDER — HYDROXYCHLOROQUINE SULFATE 200 MG/1
200 TABLET, FILM COATED ORAL 2 TIMES DAILY
Qty: 60 TABLET | Refills: 7 | Status: SHIPPED | OUTPATIENT
Start: 2024-06-07

## 2024-06-07 RX ORDER — UPADACITINIB 15 MG/1
15 TABLET, EXTENDED RELEASE ORAL DAILY
Qty: 30 TABLET | Refills: 7 | Status: SHIPPED | OUTPATIENT
Start: 2024-06-07

## 2024-06-07 RX ORDER — METHYLPREDNISOLONE 4 MG/1
TABLET ORAL
Qty: 65 TABLET | Refills: 1 | Status: SHIPPED | OUTPATIENT
Start: 2024-06-07

## 2024-06-07 ASSESSMENT — PAIN SCALES - GENERAL: PAINLEVEL: SEVERE PAIN (6)

## 2024-06-07 NOTE — PROGRESS NOTES
Video start 240  Video end 305  Provider onsite  Patient at [facility])      Rheumatology Clinic  Everett Austin MD  2024     Name: Shira Rodriguez  MRN: [id number]  Age: 64 year old  : 1960  Referring provider: Anjel Busby     Assessment and Plan:  # Inflammatory Arthritis (+RF/+CCP, dx 2017 with diffuse polyarticular inflammatory flare):   Ms. Rodriguez reports overall improvement in joint pain affecting hands fingers feet wrists and elbows since the last visit.  Morning stiffness has decreased in duration.  Video exam shows thenar wasting in both hands, incomplete extension in multiple digits on the right.  Limited wrist flexion and extension on the left.    Data: In 2024, creatinine was 1.91 with a GFR of 29, reduced from 33 in 2023.  Transaminases and albumin were normal.  Bilirubin was normal, CRP was elevated at 93    In 2022 rheumatoid factor was greater than 200 international units, increased in titer significantly since last measurement in .  Cyclic citrullinated peptide antibodies were present at 11 units, not significantly different from the low titer observed in 2017.  Imaging: Foot x-rays 2023 showed degenerative arthritis right second MTP heterotopic bone over the distal Achilles on the right, and forefoot soft tissue swelling.    Discussion: Bilateral knuckle and finger pain, prolonged morning stiffness, bilateral midfoot pain,are compatible with flaring inflammatory arthritis.  Bilateral wrist, shoulder and knee predominant pain present mechanical, degenerative joint disease generated pain, and would not be expected to respond to immunomodulatory therapy.    Patient formerly had excellent control of symptoms with methotrexate monotherapy. Unfortunately, methotrexate use in  was associated with severe myelosuppressive effects and marked mucositis, likely contributing to recent prolonged hospitalization for  sepsis in 2023.  I recommend that methotrexate be avoided indefinitely. I now recommend a trial of José Miguel kinase inhibition with upadacitinib 15 mg daily.  I expect benefit within 2 to 6 weeks.  I recommend continuing hydroxychloroquine while starting upadacitinib.  Short-term relief of inflammatory joint pain can be addressed with methylprednisolone 20 mg daily for a week followed by taper.    If alternative immunomodulatory therapy is warranted for recurrent inflammatory joint symptoms, I will recommend use of targeted therapy with no myelosuppressive potential, including for example abatacept.    #Shoulder osteoarthritis: Xrays showed joint space narrowing of glenohumeral joints and subluxation with inferior migration of the humeral heads.  We discussed my impression that shoulder pain does not reflect active inflammatory arthritis, and that immunomodulatory medication would not likely improve pain in the shoulders.  I recommend a symptomatic local treatment approach, including physical therapy, to be coordinated through sports/orthopedics    # Severe obesity  # CKD3: GFR of 29 is stable since early 2023.  # Right knee osteoarthritis: Knee injected in October 2023 by orthopedics.  # Bilateral shoulder pain: Did not discuss today. X-rays suggested presence of advanced degenerative arthritis.  I recommend follow-up with sports medicine/orthopedics, physical therapy.     Plan:  1.  Medrol 5 tablets daily for 1 week, then 4 tablets daily for 1 week  2.  Continue hydroxychloroquine 200 mg 1 tablet twice daily.  Need ophthalmology exam report from several months ago and recommend continued annual evaluation for retinal Plaquenil toxicity.  3.  Continue Rinvoq 15 mg daily  4.  Utilize acetaminophen 1000 mg 3 times daily for pain.  5.  Bilateral wrist pain, foot x-rays to rule out erosions.  6.  Use heating pad and splints for daily use associated wrist pain.    RTC 6-7 mos    Everett Austin M.D.  Staff Rheumatologist, [facility]  Pager [phone]    On the day of the encounter, a total of 32 minutes was spent in chart review, and in counseling and coordination of care, regarding the patient's complex medical problem of severe rheumatoid arthritis.    The longitudinal plan of care for the diagnosis(es)/condition(s) as documented were addressed during this visit. Due to the added complexity in care, I will continue to support Shira in the subsequent management and with ongoing continuity of care.      Orders Placed This Encounter   Procedures    X-ray bl Foot 3+ views    X-ray bl Wrist G/E 3 vws    Comprehensive metabolic panel    CBC with platelets    CRP inflammation            HPI:   Shira Rodriguez has a history of rheumatoid arthritis who presents for follow up. I last saw the patient January 2024.  Seropositive rheumatoid arthritis was active after discontinuing methotrexate in the spring 2022 due to cytopenias and to mucositis.  Recommendation was to continue hydroxychloroquine and start upadacitinib.  Burst and taper of Medrol was given.    Rheumatological history:  Referrred 2/2017 for 3 months of BL hand pain thought consistent with CTS and positive RF/CCP. EMG showed moderate median neuropathy on L and severe on R, MRI c-spine normal. She received R CT injection by [facility] with 9 months of relief. She did describe intermittent episodes of swelling of her feet/ankles around this time period as well that resolved with medrol dose pack. She was re-evaluated 8/2017 after developing polyarticular inflammatory arthritis of the right knee, ankles, wrists, hands, and shoulders. She was started on MTX, given depomedrol IM injection, oral prednisone, and given R knee CS injection. MTX increased and oral prednisone tapered to off in 9/2017. Had repeat R CT injection in 10/2017 that lasted until 1/2018. R knee significantly improved following CS injection 12/2017. Has persistent pain in L heel with enthesopathy on imaging, unclear  "if had true enthesitis in this region due to her RA. Participated in pool therapy winter 2018 with good response. At her 3/2018 OV she continued to have significant pain in her R wrist thought due to CT, but given some ongoing persistent EMS (BL hand stiffness lasting ALL day) as well and her known seropositive disease I recommended adding Humira to her regimen. This was ordered but she never started. Methotrexate Rx continued through 2021, drug stopped as of early 2022. Methotrexate restarted in fall 2022, stopped in January 2023, when patient was admitted to hospital with mucositis and severe cytopenias, severe B12 and folate deficiencies. Hydroxychloroquine started in 10-23, no benefit as of 1-2024; upatacitinib trial 2-2024.    Interval history June 7, 2024    Her feet still hurt, both hands and wrists. Wrists hurt when pressing down with hands  Pain in feet, not her ankles.  Pain is worse with use of hands and feet.  Early morning stiffness is still 1 hour.  Former pain in ankles knees elbows have improved with combination hydroxychloroquine and Rinvoq.  She reports good tolerability of Rinvoq  She has not used corticosteroids since the last visit.      Interval history January 26, 2024    She has marked joint pain every day. Worst in the morning. Early morning stiffness and pain \"all day\"   Pain in ankles and feet sever with \"first few steps\" as she walks.  Hands hurt such that she cannot pull up a blanket, cook.  R knee hurst much more, no injections X 5 months.  She used prednisone for a flare, but it did not really help with the joint inflammation 3 weeks ago.  Hydroxychloroquine has been used since 10-23; she reports good tolerability, but no benefit.    Interval history October 26, 2023    Today, she reports R foot pain for several weeks. No swelling; pain worse with wt bearing, but constant, no help with tyllenol. No injury. Wrists and shoulders hurt with pressure/wt bearing.    Both hands are painful, " stiff, and have been for many weeks. No help with OTC acetaminophen.  R hand goes numb when she eats.    Early morning stiffness lasts hours.    She has continued to recover from critical illness in 2-2023. She can independently feed/dress self.    Rknee pain improved after knee injection in 3-23, but sx recurred. Orthopedics injected again in 10-23. L shoulder also injected.  She has not used oral prednisone for months.      Interval history March 16, 2023    Reports that after the last visit, she misunderstood the therapeutic plan, which involved continued withholding of methotrexate and watchful waiting.  She reports that she continues methotrexate weekly at 20 mg (8 tablets) as directed by prescription.  She used methotrexate right up until hospitalization in January    Patient was admitted to [facility] on January 28, 2023, discharged on February 21, 2023.  Discharge diagnoses include severe sepsis, resolved, acute kidney injury on CKD stage III, resolved.  Upon admission, she had severe neutropenia and pancytopenia, associated with methotrexate use prior to admission.  She was treated with rehydration and empiric antibiotics as well as Neupogen.  Concern about methotrexate use as well as severe B12 and folate deficiency was raised as a cause of neutropenia.  Bone marrow biopsy was negative for malignancy or myelodysplastic disorder.    Today, she reports that she completed a 1 week stay in the transitional care facility, and was discharged 1 week ago.  Since then, she reports joint pain, primarily in both shoulders and the right knee.  Right knee is particularly painful with weightbearing, but also provides night pain.  She has not noticed swelling or warmth. Last injection 6 months ago gave her 3 months of relief.    Shoulders are painful with raising the arms, getting dressed, lifting and reaching.  Pain is much less when shoulders are at rest.  Small joints of the hands and feet have not  been painful.  She denies prolonged morning stiffness.      Interval history September 29, 2022    She stopped methotrexate 6 months ago because she could not get syringes.  She reports the happy circumstance that former wrist hand and foot swelling stiffness and pain has not recurred despite being off the methotrexate for 6 months.  Morning stiffness is less than 10 minutes.  She has knee pain that is made worse with walking on uneven ground or when navigating stairs.  But no pain or swelling visible in knees or other joints.  She has not required prednisone or regular use of nonsteroidals since last visit.    Interval history 05-:    Overall joints are doing well.  She has some soreness with flexion of the second and third DIPs in the right hand.  Otherwise there is no swelling, stiffness, redness, or restriction in motion of hand and finger joints.  Knees bother her with prolonged walking, but she looks forward to returning to the pool after the pandemic for aqua exercise.  She believes that the methotrexate has been associated with greater than 80% improvement in joint pain and other symptoms.    Unrelenting fatigue continues.  She notes a new source of exhaustion and that her mother recently had a stroke and the patient is is sharing personal care duties with a sister.    She is remained on methotrexate through the COVID-19 pandemic.  No further oral ulcerations.  She has not been using leucovorin.  She has used folate 3 mg daily.    Prior history  2-2020    Today, the patient reports that she has been doing well since her last visit. She states that her oral lesions resolved with  leucovorin and folic acid. She also notes that her joint pain and decreased range of motion is limited to her bilateral second digits in her hands. Her left second finger is more stiff than her right, and she denies locking or ratcheting in either finger. She notes that her morning stiffness typically resolves in 15 to 30  minutes. She has no significant swelling in her feet. She notes that she has very little swelling in her legs after starting lasix. She denies rash, shortness of breath, or raynaud's flares.     The patient states that she had a cough prior to her pulmonary embolism/myocardial infarction. She then developed pneumonia while intubated and required a thoracentesis while in the hospital. She reports that she had a persistent cough after this procedure and repeat ultrasound noted further fluid for which she had another thoracentesis which produced 600 ml of fluid. She notes that her cough is improved after her second thoracentesis but is still present. She states her pulmonologist is concerned her remaining cough could be related to her Methotrexate. She is set up for a repeat PFTs and Chest Xray in the coming months.           Review of Systems:   Pertinent items are noted in HPI or as below, remainder of complete ROS is negative.    No fevers/chills/sweats  No recent problems with hearing or vision. No swallowing problems.   No breathing difficulty, shortness of breath, coughing, or wheezing  No chest pain or palpitations  No heart burn, indigestion, abdominal pain, nausea, vomiting, diarrhea  No urination problems, no bloody, cloudy urine, no dysuria  No numbing, tingling, weakness  No headaches or confusion  No rashes. No easy bleeding or bruising.       Active Medications:   Current Outpatient Medications   Medication Sig Dispense Refill    ARIPiprazole (ABILIFY) 5 MG tablet Take 1 tablet by mouth daily at 2 pm      DULoxetine (CYMBALTA) 60 MG capsule Take 1 capsule (60 mg) by mouth daily (Patient taking differently: Take 120 mg by mouth daily) 30 capsule 0    hydroxychloroquine (PLAQUENIL) 200 MG tablet Take 1 tablet (200 mg) by mouth 2 times daily 60 tablet 7    lisinopril (ZESTRIL) 5 MG tablet Take 1 tablet (5 mg) by mouth daily Please call [phone] to schedule an appointment. A follow up is needed for any  further refills. 90 tablet 0    methylPREDNISolone (MEDROL) 4 MG tablet Take 5 tabs daily for 1 week, then take 4 tabs daily for 1 week 65 tablet 1    multivitamin w/minerals (THERA-VIT-M) tablet Take 1 tablet by mouth daily 100 tablet 1    upadacitinib ER (RINVOQ) 15 MG tablet Take 1 tablet (15 mg) by mouth daily 30 tablet 7    VITAMIN D3 25 MCG (1000 UT) tablet TAKE 2 TABLETS BY MOUTH EVERY DAY      warfarin ANTICOAGULANT (COUMADIN) 5 MG tablet Take 1.5 to 2 tablets by mouth every day OR as directed by Anticoagulation Clinic-adjusted per INR results 180 tablet 1    acetaminophen (TYLENOL) 325 MG tablet Take 2 tablets (650 mg) by mouth every 6 hours as needed for mild pain or fever      cyanocobalamin (VITAMIN B-12) 500 MCG SUBL sublingual tablet Place 2 tablets (1,000 mcg) under the tongue daily 180 tablet 1    nystatin (MYCOSTATIN) 638802 UNIT/GM external powder Apply topically 4 times daily as needed 60 g 1    senna-docusate (SENOKOT-S/PERICOLACE) 8.6-50 MG tablet Take 1 tablet by mouth 2 times daily as needed for constipation 180 tablet 1    Warfarin Therapy Reminder Take 1 each by mouth See Admin Instructions       Current Facility-Administered Medications   Medication Dose Route Frequency Provider Last Rate Last Admin    lidocaine 1 % injection 3 mL  3 mL   Tashi Joy, DO   3 mL at 06/22/23 1738    lidocaine 1 % injection 4 mL  4 mL   Tashi Joy, DO   4 mL at 09/07/23 1635    lidocaine 1 % injection 4 mL  4 mL   Tashi Joy, DO   4 mL at 06/22/23 1738    methylPREDNISolone (DEPO-MEDROL) injection 40 mg  40 mg   Tashi Joy A, DO   40 mg at 06/22/23 1738    triamcinolone (KENALOG-40) injection 40 mg  40 mg   Tashi Joy, DO   40 mg at 09/07/23 1635     Facility-Administered Medications Ordered in Other Visits   Medication Dose Route Frequency Provider Last Rate Last Admin    sodium chloride (PF) 0.9% PF flush 10 mL  10 mL Intracatheter Once Loy Joshi APRN CNP         Stopped  lipitor       Allergies:   Adhesive tape and Erythromycin      Past Medical History:  Depression   Eczema   Hyperlipidemia   Hypertension   Morbid obesity   Osteoarthrosis right knee   Sleep apnea   Tobacco use disorder   Primary localized osteoarthrosis, pelvic region and thigh  Degeneration of cervical intervertebral disc  Chronic bronchitis   Pulmonary embolism   Cardiac arrest; required ECMO 9-2019     Past Surgical History:  Left total knee arthoplasty 6/14/2012   Right hip arthroplasty 07/09/2004      Family History:   Mother: thyroid disease, hypertension, skin cancer, CVA  Paternal grandmother: breast cancer, pancreatic cancer   Father: alcoholism   Sister: thyroid disease, alcoholism   Maternal grandmother: hypertension   Sister: heart disease, multiple ablations   Paternal grandfather: prostate cancer      Social History:   Current everyday cigarette smoker, 1 pack/day, 33 years, started 1982  No smokeless tobacco use  Occasional alcohol use   Physical Exam:   There were no vitals taken for this visit.   Wt Readings from Last 4 Encounters:   10/26/23 116.6 kg (257 lb)   09/07/23 121.1 kg (267 lb)   06/22/23 116.1 kg (256 lb)   05/24/23 120.5 kg (265 lb 11.2 oz)     Constitutional: Well-developed, appearing stated age; cooperative, severe obese  Eyes: Normal EOM, PERRLA, vision, conjunctiva, sclera  ENT: Normal external ears, nose, hearing, lips, teeth, gums, throat. No mucous membrane lesions, normal saliva pool  Neck: No mass or thyroid enlargement  MS: Reduced fist formation; thenar wasting right greater than left hands.  Skin: No nail pitting, alopecia, nodules or lesions.    Neuro: Normal cranial nerves  Psych: Normal judgement, orientation, memory, affect.     Laboratory:       Latest Ref Rng & Units 5/8/2023     3:03 PM 11/2/2023     2:55 PM 1/26/2024     4:02 PM   RHEUM RESULTS   Albumin 3.5 - 5.2 g/dL  3.5  3.0    ALT 0 - 50 U/L  26  9    AST 0 - 45 U/L  22  14    Creatinine 0.51 - 0.95 mg/dL  1.69   1.91    CRP Inflammation <5.00 mg/L  21.40  93.50    GFR Estimate >60 mL/min/1.73m2  34  29    Hematocrit 35.0 - 47.0 % 35.2  39.9  31.0    Hemoglobin 11.7 - 15.7 g/dL 11.5  12.6  9.7    Hep B Surface Agn Nonreactive   Nonreactive    Hepatitis C Antibody Nonreactive   Nonreactive    WBC 4.0 - 11.0 10e3/uL 6.1  12.0  7.9    RBC Count 3.80 - 5.20 10e6/uL 3.90  4.32  3.34    RDW 10.0 - 15.0 % 13.3  14.7  13.4    MCHC 31.5 - 36.5 g/dL 32.7  31.6  31.3    MCV 78 - 100 fL 90  92  93    Platelet Count 150 - 450 10e3/uL 321  392  427    Sed Rate 0 - 30 mm/hr   >140    Quantiferon-TB Gold Plus Result Negative   Indeterminate        Rheumatoid Factor   Date Value Ref Range Status   08/28/2017 51 (H) <20 IU/mL Final     Cyclic Citrullinated Peptide Antibody, IgG   Date Value Ref Range Status   08/28/2017 15 (H) <7 U/mL Final     Comment:     Positive     EMERY Screen by EIA   Date Value Ref Range Status   12/30/2016 <1.0  Interpretation:  Negative   <1.0 Final     Hepatitis B Core Caron   Date Value Ref Range Status   04/12/2017 Nonreactive NR Final     Hep B Surface Agn   Date Value Ref Range Status   04/12/2017 Nonreactive NR Final     IGG   Date Value Ref Range Status   04/21/2017 940 695 - 1,620 mg/dL Final     IGA   Date Value Ref Range Status   04/21/2017 271 70 - 380 mg/dL Final     IGM   Date Value Ref Range Status   04/21/2017 395 (H) 60 - 265 mg/dL Final     Chief Complaint   Patient presents with    RECHECK     Rheumatoid arthritis with negative rheumatoid factor, involving unspecified site (H) +1 more     not currently breastfeeding.  Patient was last seen

## 2024-06-07 NOTE — PATIENT INSTRUCTIONS
Diagnosis:  1.  Flaring inflammatory/rheumatoid) arthritis.  Pain in ankles and small joints is overall improved with combination medicine.  I recommend continuing Rinvoq and hydroxychloroquine.  2.  Wrist pain: Make a trial of volar wrist splints for better comfort during use during the day.  3.  Try another course of Medrol to knock down suspected inflammatory arthritis flare.    Plan:  1.  Medrol 5 tablets daily for 1 week, then 4 tablets daily for 1 week  2.  Continue hydroxychloroquine 200 mg 1 tablet twice daily.  Need ophthalmology exam report from several months ago and recommend continued annual evaluation for retinal Plaquenil toxicity.  3.  Continue Rinvoq 15 mg daily  4.  Utilize acetaminophen 1000 mg 3 times daily for pain.  5.  Bilateral wrist pain, foot x-rays to rule out erosions.  6.  Use heating pad and splints for daily use associated wrist pain.

## 2024-06-07 NOTE — LETTER
2024       RE: Shira Rodriguez  [address]  [place] [address]     Dear Colleague,    Thank you for referring your patient, Shira Rodriguez, to the [facility] [place] at [facility]. Please see a copy of my visit note below.      Video start 240  Video end       Rheumatology Clinic  Everett Austin MD  2024     Name: Shira Rodriguez  MRN: [id number]  Age: 64 year old  : 1960  Referring provider: Anjel Busby     Assessment and Plan:  # Inflammatory Arthritis (+RF/+CCP, dx 2017 with diffuse polyarticular inflammatory flare):   Ms. Rodriguez reports overall improvement in joint pain affecting hands fingers feet wrists and elbows since the last visit.  Morning stiffness has decreased in duration.  Video exam shows thenar wasting in both hands, incomplete extension in multiple digits on the right.  Limited wrist flexion and extension on the left.    Data: In 2024, creatinine was 1.91 with a GFR of 29, reduced from 33 in 2023.  Transaminases and albumin were normal.  Bilirubin was normal, CRP was elevated at 93    In 2022 rheumatoid factor was greater than 200 international units, increased in titer significantly since last measurement in 2017.  Cyclic citrullinated peptide antibodies were present at 11 units, not significantly different from the low titer observed in 2017.  Imaging: Foot x-rays 2023 showed degenerative arthritis right second MTP heterotopic bone over the distal Achilles on the right, and forefoot soft tissue swelling.    Discussion: Bilateral knuckle and finger pain, prolonged morning stiffness, bilateral midfoot pain,are compatible with flaring inflammatory arthritis.  Bilateral wrist, shoulder and knee predominant pain present mechanical, degenerative joint disease generated pain, and would not be expected to respond to immunomodulatory  therapy.    Patient formerly had excellent control of symptoms with methotrexate monotherapy. Unfortunately, methotrexate use in 2022-23 was associated with severe myelosuppressive effects and marked mucositis, likely contributing to recent prolonged hospitalization for sepsis in 2023.  I recommend that methotrexate be avoided indefinitely. I now recommend a trial of José Miguel kinase inhibition with upadacitinib 15 mg daily.  I expect benefit within 2 to 6 weeks.  I recommend continuing hydroxychloroquine while starting upadacitinib.  Short-term relief of inflammatory joint pain can be addressed with methylprednisolone 20 mg daily for a week followed by taper.    If alternative immunomodulatory therapy is warranted for recurrent inflammatory joint symptoms, I will recommend use of targeted therapy with no myelosuppressive potential, including for example abatacept.    #Shoulder osteoarthritis: Xrays showed joint space narrowing of glenohumeral joints and subluxation with inferior migration of the humeral heads.  We discussed my impression that shoulder pain does not reflect active inflammatory arthritis, and that immunomodulatory medication would not likely improve pain in the shoulders.  I recommend a symptomatic local treatment approach, including physical therapy, to be coordinated through sports/orthopedics    # Severe obesity  # CKD3: GFR of 29 is stable since early 2023.  # Right knee osteoarthritis: Knee injected in October 2023 by orthopedics.  # Bilateral shoulder pain: Did not discuss today. X-rays suggested presence of advanced degenerative arthritis.  I recommend follow-up with sports medicine/orthopedics, physical therapy.     Plan:  1.  Medrol 5 tablets daily for 1 week, then 4 tablets daily for 1 week  2.  Continue hydroxychloroquine 200 mg 1 tablet twice daily.  Need ophthalmology exam report from several months ago and recommend continued annual evaluation for retinal Plaquenil toxicity.  3.  Continue  Rinvoq 15 mg daily  4.  Utilize acetaminophen 1000 mg 3 times daily for pain.  5.  Bilateral wrist pain, foot x-rays to rule out erosions.  6.  Use heating pad and splints for daily use associated wrist pain.    RTC 6-7 mos    Everett Austin M.D.  Staff Rheumatologist,  Health  Pager [phone]    On the day of the encounter, a total of 32 minutes was spent in chart review, and in counseling and coordination of care, regarding the patient's complex medical problem of severe rheumatoid arthritis.    The longitudinal plan of care for the diagnosis(es)/condition(s) as documented were addressed during this visit. Due to the added complexity in care, I will continue to support Shira in the subsequent management and with ongoing continuity of care.      Orders Placed This Encounter   Procedures    X-ray bl Foot 3+ views    X-ray bl Wrist G/E 3 vws    Comprehensive metabolic panel    CBC with platelets    CRP inflammation            HPI:   Shira Rodriguez has a history of rheumatoid arthritis who presents for follow up. I last saw the patient January 2024.  Seropositive rheumatoid arthritis was active after discontinuing methotrexate in the spring 2022 due to cytopenias and to mucositis.  Recommendation was to continue hydroxychloroquine and start upadacitinib.  Burst and taper of Medrol was given.    Rheumatological history:  Referrred 2/2017 for 3 months of BL hand pain thought consistent with CTS and positive RF/CCP. EMG showed moderate median neuropathy on L and severe on R, MRI c-spine normal. She received R CT injection by sports med with 9 months of relief. She did describe intermittent episodes of swelling of her feet/ankles around this time period as well that resolved with medrol dose pack. She was re-evaluated 8/2017 after developing polyarticular inflammatory arthritis of the right knee, ankles, wrists, hands, and shoulders. She was started on MTX, given depomedrol IM injection, oral prednisone, and given R  "knee CS injection. MTX increased and oral prednisone tapered to off in 9/2017. Had repeat R CT injection in 10/2017 that lasted until 1/2018. R knee significantly improved following CS injection 12/2017. Has persistent pain in L heel with enthesopathy on imaging, unclear if had true enthesitis in this region due to her RA. Participated in pool therapy winter 2018 with good response. At her 3/2018 OV she continued to have significant pain in her R wrist thought due to CT, but given some ongoing persistent EMS (BL hand stiffness lasting ALL day) as well and her known seropositive disease I recommended adding Humira to her regimen. This was ordered but she never started. Methotrexate Rx continued through 2021, drug stopped as of early 2022. Methotrexate restarted in fall 2022, stopped in January 2023, when patient was admitted to hospital with mucositis and severe cytopenias, severe B12 and folate deficiencies. Hydroxychloroquine started in 10-23, no benefit as of 1-2024; upatacitinib trial 2-2024.    Interval history June 7, 2024    Her feet still hurt, both hands and wrists. Wrists hurt when pressing down with hands  Pain in feet, not her ankles.  Pain is worse with use of hands and feet.  Early morning stiffness is still 1 hour.  Former pain in ankles knees elbows have improved with combination hydroxychloroquine and Rinvoq.  She reports good tolerability of Rinvoq  She has not used corticosteroids since the last visit.      Interval history January 26, 2024    She has marked joint pain every day. Worst in the morning. Early morning stiffness and pain \"all day\"   Pain in ankles and feet sever with \"first few steps\" as she walks.  Hands hurt such that she cannot pull up a blanket, cook.  R knee hurst much more, no injections X 5 months.  She used prednisone for a flare, but it did not really help with the joint inflammation 3 weeks ago.  Hydroxychloroquine has been used since 10-23; she reports good tolerability, but " no benefit.    Interval history October 26, 2023    Today, she reports R foot pain for several weeks. No swelling; pain worse with wt bearing, but constant, no help with tyllenol. No injury. Wrists and shoulders hurt with pressure/wt bearing.    Both hands are painful, stiff, and have been for many weeks. No help with OTC acetaminophen.  R hand goes numb when she eats.    Early morning stiffness lasts hours.    She has continued to recover from critical illness in 2-2023. She can independently feed/dress self.    Rknee pain improved after knee injection in 3-23, but sx recurred. Orthopedics injected again in 10-23. L shoulder also injected.  She has not used oral prednisone for months.      Interval history March 16, 2023    Reports that after the last visit, she misunderstood the therapeutic plan, which involved continued withholding of methotrexate and watchful waiting.  She reports that she continues methotrexate weekly at 20 mg (8 tablets) as directed by prescription.  She used methotrexate right up until hospitalization in January    Patient was admitted to [facility] on January 28, 2023, discharged on February 21, 2023.  Discharge diagnoses include severe sepsis, resolved, acute kidney injury on CKD stage III, resolved.  Upon admission, she had severe neutropenia and pancytopenia, associated with methotrexate use prior to admission.  She was treated with rehydration and empiric antibiotics as well as Neupogen.  Concern about methotrexate use as well as severe B12 and folate deficiency was raised as a cause of neutropenia.  Bone marrow biopsy was negative for malignancy or myelodysplastic disorder.    Today, she reports that she completed a 1 week stay in the transitional care facility, and was discharged 1 week ago.  Since then, she reports joint pain, primarily in both shoulders and the right knee.  Right knee is particularly painful with weightbearing, but also provides night pain.  She has  not noticed swelling or warmth. Last injection 6 months ago gave her 3 months of relief.    Shoulders are painful with raising the arms, getting dressed, lifting and reaching.  Pain is much less when shoulders are at rest.  Small joints of the hands and feet have not been painful.  She denies prolonged morning stiffness.      Interval history September 29, 2022    She stopped methotrexate 6 months ago because she could not get syringes.  She reports the happy circumstance that former wrist hand and foot swelling stiffness and pain has not recurred despite being off the methotrexate for 6 months.  Morning stiffness is less than 10 minutes.  She has knee pain that is made worse with walking on uneven ground or when navigating stairs.  But no pain or swelling visible in knees or other joints.  She has not required prednisone or regular use of nonsteroidals since last visit.    Interval history 05-:    Overall joints are doing well.  She has some soreness with flexion of the second and third DIPs in the right hand.  Otherwise there is no swelling, stiffness, redness, or restriction in motion of hand and finger joints.  Knees bother her with prolonged walking, but she looks forward to returning to the pool after the pandemic for aqua exercise.  She believes that the methotrexate has been associated with greater than 80% improvement in joint pain and other symptoms.    Unrelenting fatigue continues.  She notes a new source of exhaustion and that her mother recently had a stroke and the patient is is sharing personal care duties with a sister.    She is remained on methotrexate through the COVID-19 pandemic.  No further oral ulcerations.  She has not been using leucovorin.  She has used folate 3 mg daily.    Prior history  2-2020    Today, the patient reports that she has been doing well since her last visit. She states that her oral lesions resolved with  leucovorin and folic acid. She also notes that her joint  pain and decreased range of motion is limited to her bilateral second digits in her hands. Her left second finger is more stiff than her right, and she denies locking or ratcheting in either finger. She notes that her morning stiffness typically resolves in 15 to 30 minutes. She has no significant swelling in her feet. She notes that she has very little swelling in her legs after starting lasix. She denies rash, shortness of breath, or raynaud's flares.     The patient states that she had a cough prior to her pulmonary embolism/myocardial infarction. She then developed pneumonia while intubated and required a thoracentesis while in the hospital. She reports that she had a persistent cough after this procedure and repeat ultrasound noted further fluid for which she had another thoracentesis which produced 600 ml of fluid. She notes that her cough is improved after her second thoracentesis but is still present. She states her pulmonologist is concerned her remaining cough could be related to her Methotrexate. She is set up for a repeat PFTs and Chest Xray in the coming months.           Review of Systems:   Pertinent items are noted in HPI or as below, remainder of complete ROS is negative.    No fevers/chills/sweats  No recent problems with hearing or vision. No swallowing problems.   No breathing difficulty, shortness of breath, coughing, or wheezing  No chest pain or palpitations  No heart burn, indigestion, abdominal pain, nausea, vomiting, diarrhea  No urination problems, no bloody, cloudy urine, no dysuria  No numbing, tingling, weakness  No headaches or confusion  No rashes. No easy bleeding or bruising.       Active Medications:   Current Outpatient Medications   Medication Sig Dispense Refill    ARIPiprazole (ABILIFY) 5 MG tablet Take 1 tablet by mouth daily at 2 pm      DULoxetine (CYMBALTA) 60 MG capsule Take 1 capsule (60 mg) by mouth daily (Patient taking differently: Take 120 mg by mouth daily) 30  capsule 0    hydroxychloroquine (PLAQUENIL) 200 MG tablet Take 1 tablet (200 mg) by mouth 2 times daily 60 tablet 7    lisinopril (ZESTRIL) 5 MG tablet Take 1 tablet (5 mg) by mouth daily Please call [phone] to schedule an appointment. A follow up is needed for any further refills. 90 tablet 0    methylPREDNISolone (MEDROL) 4 MG tablet Take 5 tabs daily for 1 week, then take 4 tabs daily for 1 week 65 tablet 1    multivitamin w/minerals (THERA-VIT-M) tablet Take 1 tablet by mouth daily 100 tablet 1    upadacitinib ER (RINVOQ) 15 MG tablet Take 1 tablet (15 mg) by mouth daily 30 tablet 7    VITAMIN D3 25 MCG (1000 UT) tablet TAKE 2 TABLETS BY MOUTH EVERY DAY      warfarin ANTICOAGULANT (COUMADIN) 5 MG tablet Take 1.5 to 2 tablets by mouth every day OR as directed by Anticoagulation Clinic-adjusted per INR results 180 tablet 1    acetaminophen (TYLENOL) 325 MG tablet Take 2 tablets (650 mg) by mouth every 6 hours as needed for mild pain or fever      cyanocobalamin (VITAMIN B-12) 500 MCG SUBL sublingual tablet Place 2 tablets (1,000 mcg) under the tongue daily 180 tablet 1    nystatin (MYCOSTATIN) 049430 UNIT/GM external powder Apply topically 4 times daily as needed 60 g 1    senna-docusate (SENOKOT-S/PERICOLACE) 8.6-50 MG tablet Take 1 tablet by mouth 2 times daily as needed for constipation 180 tablet 1    Warfarin Therapy Reminder Take 1 each by mouth See Admin Instructions       Current Facility-Administered Medications   Medication Dose Route Frequency Provider Last Rate Last Admin    lidocaine 1 % injection 3 mL  3 mL   Tashi Joy DO   3 mL at 06/22/23 1738    lidocaine 1 % injection 4 mL  4 mL   Tashi Joy DO   4 mL at 09/07/23 1635    lidocaine 1 % injection 4 mL  4 mL   Tashi Joy DO   4 mL at 06/22/23 1738    methylPREDNISolone (DEPO-MEDROL) injection 40 mg  40 mg   Tashi Joy DO   40 mg at 06/22/23 1738    triamcinolone (KENALOG-40) injection 40 mg  40 mg   Tashi Joy DO   40  mg at 09/07/23 1635     Facility-Administered Medications Ordered in Other Visits   Medication Dose Route Frequency Provider Last Rate Last Admin    sodium chloride (PF) 0.9% PF flush 10 mL  10 mL Intracatheter Once Loy Joshi APRN CNP         Stopped lipitor       Allergies:   Adhesive tape and Erythromycin      Past Medical History:  Depression   Eczema   Hyperlipidemia   Hypertension   Morbid obesity   Osteoarthrosis right knee   Sleep apnea   Tobacco use disorder   Primary localized osteoarthrosis, pelvic region and thigh  Degeneration of cervical intervertebral disc  Chronic bronchitis   Pulmonary embolism   Cardiac arrest; required ECMO 9-2019     Past Surgical History:  Left total knee arthoplasty 6/14/2012   Right hip arthroplasty 07/09/2004      Family History:   Mother: thyroid disease, hypertension, skin cancer, CVA  Paternal grandmother: breast cancer, pancreatic cancer   Father: alcoholism   Sister: thyroid disease, alcoholism   Maternal grandmother: hypertension   Sister: heart disease, multiple ablations   Paternal grandfather: prostate cancer      Social History:   Current everyday cigarette smoker, 1 pack/day, 33 years, started 1982  No smokeless tobacco use  Occasional alcohol use   Physical Exam:   There were no vitals taken for this visit.   Wt Readings from Last 4 Encounters:   10/26/23 116.6 kg (257 lb)   09/07/23 121.1 kg (267 lb)   06/22/23 116.1 kg (256 lb)   05/24/23 120.5 kg (265 lb 11.2 oz)     Constitutional: Well-developed, appearing stated age; cooperative, severe obese  Eyes: Normal EOM, PERRLA, vision, conjunctiva, sclera  ENT: Normal external ears, nose, hearing, lips, teeth, gums, throat. No mucous membrane lesions, normal saliva pool  Neck: No mass or thyroid enlargement  MS: Reduced fist formation; thenar wasting right greater than left hands.  Skin: No nail pitting, alopecia, nodules or lesions.    Neuro: Normal cranial nerves  Psych: Normal judgement,  orientation, memory, affect.     Laboratory:       Latest Ref Rng & Units 5/8/2023     3:03 PM 11/2/2023     2:55 PM 1/26/2024     4:02 PM   RHEUM RESULTS   Albumin 3.5 - 5.2 g/dL  3.5  3.0    ALT 0 - 50 U/L  26  9    AST 0 - 45 U/L  22  14    Creatinine 0.51 - 0.95 mg/dL  1.69  1.91    CRP Inflammation <5.00 mg/L  21.40  93.50    GFR Estimate >60 mL/min/1.73m2  34  29    Hematocrit 35.0 - 47.0 % 35.2  39.9  31.0    Hemoglobin 11.7 - 15.7 g/dL 11.5  12.6  9.7    Hep B Surface Agn Nonreactive   Nonreactive    Hepatitis C Antibody Nonreactive   Nonreactive    WBC 4.0 - 11.0 10e3/uL 6.1  12.0  7.9    RBC Count 3.80 - 5.20 10e6/uL 3.90  4.32  3.34    RDW 10.0 - 15.0 % 13.3  14.7  13.4    MCHC 31.5 - 36.5 g/dL 32.7  31.6  31.3    MCV 78 - 100 fL 90  92  93    Platelet Count 150 - 450 10e3/uL 321  392  427    Sed Rate 0 - 30 mm/hr   >140    Quantiferon-TB Gold Plus Result Negative   Indeterminate        Rheumatoid Factor   Date Value Ref Range Status   08/28/2017 51 (H) <20 IU/mL Final     Cyclic Citrullinated Peptide Antibody, IgG   Date Value Ref Range Status   08/28/2017 15 (H) <7 U/mL Final     Comment:     Positive     EMERY Screen by EIA   Date Value Ref Range Status   12/30/2016 <1.0  Interpretation:  Negative   <1.0 Final     Hepatitis B Core Caron   Date Value Ref Range Status   04/12/2017 Nonreactive NR Final     Hep B Surface Agn   Date Value Ref Range Status   04/12/2017 Nonreactive NR Final     IGG   Date Value Ref Range Status   04/21/2017 940 695 - 1,620 mg/dL Final     IGA   Date Value Ref Range Status   04/21/2017 271 70 - 380 mg/dL Final     IGM   Date Value Ref Range Status   04/21/2017 395 (H) 60 - 265 mg/dL Final     Chief Complaint   Patient presents with    RECHECK     Rheumatoid arthritis with negative rheumatoid factor, involving unspecified site (H) +1 more     not currently breastfeeding.  Patient was last seen      Again, thank you for allowing me to participate in the care of your patient.       Sincerely,    Everett Austin MD

## 2024-06-08 SDOH — HEALTH STABILITY: PHYSICAL HEALTH: ON AVERAGE, HOW MANY DAYS PER WEEK DO YOU ENGAGE IN MODERATE TO STRENUOUS EXERCISE (LIKE A BRISK WALK)?: 0 DAYS

## 2024-06-08 SDOH — HEALTH STABILITY: PHYSICAL HEALTH: ON AVERAGE, HOW MANY MINUTES DO YOU ENGAGE IN EXERCISE AT THIS LEVEL?: PATIENT DECLINED

## 2024-06-08 ASSESSMENT — SOCIAL DETERMINANTS OF HEALTH (SDOH): HOW OFTEN DO YOU GET TOGETHER WITH FRIENDS OR RELATIVES?: ONCE A WEEK

## 2024-06-12 ASSESSMENT — PATIENT HEALTH QUESTIONNAIRE - PHQ9
SUM OF ALL RESPONSES TO PHQ QUESTIONS 1-9: 15
SUM OF ALL RESPONSES TO PHQ QUESTIONS 1-9: 15
10. IF YOU CHECKED OFF ANY PROBLEMS, HOW DIFFICULT HAVE THESE PROBLEMS MADE IT FOR YOU TO DO YOUR WORK, TAKE CARE OF THINGS AT HOME, OR GET ALONG WITH OTHER PEOPLE: SOMEWHAT DIFFICULT

## 2024-06-13 ENCOUNTER — OFFICE VISIT (OUTPATIENT)
Dept: FAMILY MEDICINE | Facility: CLINIC | Age: 64
End: 2024-06-13
Payer: COMMERCIAL

## 2024-06-13 VITALS
SYSTOLIC BLOOD PRESSURE: 122 MMHG | WEIGHT: 286.9 LBS | DIASTOLIC BLOOD PRESSURE: 76 MMHG | OXYGEN SATURATION: 96 % | HEART RATE: 76 BPM | BODY MASS INDEX: 46.31 KG/M2

## 2024-06-13 DIAGNOSIS — Z78.0 POSTMENOPAUSAL STATUS: ICD-10-CM

## 2024-06-13 DIAGNOSIS — R21 RASH: ICD-10-CM

## 2024-06-13 DIAGNOSIS — I46.9 CARDIAC ARREST (H): ICD-10-CM

## 2024-06-13 DIAGNOSIS — Z29.11 NEED FOR VACCINATION AGAINST RESPIRATORY SYNCYTIAL VIRUS: ICD-10-CM

## 2024-06-13 DIAGNOSIS — K59.00 CONSTIPATION, UNSPECIFIED CONSTIPATION TYPE: ICD-10-CM

## 2024-06-13 DIAGNOSIS — M06.00 RHEUMATOID ARTHRITIS WITH NEGATIVE RHEUMATOID FACTOR, INVOLVING UNSPECIFIED SITE (H): ICD-10-CM

## 2024-06-13 DIAGNOSIS — Z12.31 VISIT FOR SCREENING MAMMOGRAM: ICD-10-CM

## 2024-06-13 DIAGNOSIS — E56.9 VITAMIN DEFICIENCY: ICD-10-CM

## 2024-06-13 DIAGNOSIS — I26.99 PULMONARY EMBOLISM, UNSPECIFIED CHRONICITY, UNSPECIFIED PULMONARY EMBOLISM TYPE, UNSPECIFIED WHETHER ACUTE COR PULMONALE PRESENT (H): Primary | ICD-10-CM

## 2024-06-13 PROCEDURE — 90480 ADMN SARSCOV2 VAC 1/ONLY CMP: CPT | Performed by: FAMILY MEDICINE

## 2024-06-13 PROCEDURE — G0439 PPPS, SUBSEQ VISIT: HCPCS | Performed by: FAMILY MEDICINE

## 2024-06-13 PROCEDURE — 91320 SARSCV2 VAC 30MCG TRS-SUC IM: CPT | Performed by: FAMILY MEDICINE

## 2024-06-13 RX ORDER — RESPIRATORY SYNCYTIAL VIRUS VACCINE 120MCG/0.5
0.5 KIT INTRAMUSCULAR ONCE
Qty: 1 EACH | Refills: 0 | Status: CANCELLED | OUTPATIENT
Start: 2024-06-13 | End: 2024-06-13

## 2024-06-13 RX ORDER — NYSTATIN 100000 [USP'U]/G
POWDER TOPICAL 4 TIMES DAILY PRN
Qty: 60 G | Refills: 1 | Status: SHIPPED | OUTPATIENT
Start: 2024-06-13

## 2024-06-13 RX ORDER — MULTIPLE VITAMINS W/ MINERALS TAB 9MG-400MCG
1 TAB ORAL DAILY
Qty: 100 TABLET | Refills: 1 | Status: SHIPPED | OUTPATIENT
Start: 2024-06-13

## 2024-06-13 RX ORDER — AMOXICILLIN 250 MG
1 CAPSULE ORAL 2 TIMES DAILY PRN
Qty: 180 TABLET | Refills: 1 | Status: SHIPPED | OUTPATIENT
Start: 2024-06-13

## 2024-06-13 RX ORDER — LISINOPRIL 5 MG/1
5 TABLET ORAL DAILY
Qty: 90 TABLET | Refills: 3 | Status: SHIPPED | OUTPATIENT
Start: 2024-06-13

## 2024-06-13 NOTE — PROGRESS NOTES
"Preventive Care Visit  [facility]  Anjel Busby MD, Family Medicine  Jun 13, 2024      Assessment & Plan     Need for vaccination against respiratory syncytial virus  Do at drugstore    Visit for screening mammogram    - MA Screening Bilateral w/ Reagan; Future    Cardiac arrest (H)  Due for refill  - lisinopril (ZESTRIL) 5 MG tablet; Take 1 tablet (5 mg) by mouth daily    Vitamin deficiency    - multivitamin w/minerals (THERA-VIT-M) tablet; Take 1 tablet by mouth daily    Rash  Uses prn  - nystatin (MYCOSTATIN) 576243 UNIT/GM external powder; Apply topically 4 times daily as needed    Constipation, unspecified constipation type  Helps, tolerated  - senna-docusate (SENOKOT-S/PERICOLACE) 8.6-50 MG tablet; Take 1 tablet by mouth 2 times daily as needed for constipation    Postmenopausal status  Due  - DX Bone Density; Future    Pulmonary embolism (H)  Due  - INR; Future    Rheumatoid arthritis with negative rheumatoid factor (H)  Due per reheum  - CBC with platelets; Future  - Erythrocyte sedimentation rate auto; Future  - CRP inflammation; Future  - Comprehensive metabolic panel (BMP + Alb, Alk Phos, ALT, AST, Total. Bili, TP); Future  - Protein electrophoresis; Future      BMI  Estimated body mass index is 46.31 kg/m  as calculated from the following:    Height as of 10/26/23: 1.676 m (5' 6\").    Weight as of this encounter: 130.1 kg (286 lb 14.4 oz).   Weight management plan: Discussed healthy diet and exercise guidelines    Depression Screening Follow Up        6/12/2024     2:50 PM   PHQ   PHQ-9 Total Score 15   Q9: Thoughts of better off dead/self-harm past 2 weeks Not at all         Had L shin rash; gone  Follow Up Actions Taken  Crisis resource information provided in After Visit Summary     Counseling  Appropriate preventive services were discussed with this patient, including applicable screening as appropriate for fall prevention, nutrition, physical " activity, Tobacco-use cessation, weight loss and cognition.  Checklist reviewing preventive services available has been given to the patient.  Reviewed patient's diet, addressing concerns and/or questions.   The patient was instructed to see the dentist every 6 months.   Discussed possible causes of fatigue. Updated plan of care.  Patient reported difficulty with activities of daily living were addressed today.Addressed any concerns about safety while driving.  Information on urinary incontinence and treatment options given to patient.   The patient's PHQ-9 score is consistent with moderate depression. She was provided with information regarding depression.         No follow-ups on file.    Subjective   Shira is a 64 year old, presenting for the following:  Physical, Blood Draw (Pt would like labs from Dr Austin drawn), and Derm Problem (Pt reports rash on left lateral lower leg, seems to be improving)        6/13/2024     1:58 PM   Additional Questions   Roomed by J[employer] EMT       HPI  No acute complaints  Has Rheum and psychiatry labs due, will run today, no further labs needed  Good rheum disease mgmt per pt  She knows to do rsv shot at drugstore        6/8/2024   General Health   How would you rate your overall physical health? (!) FAIR   Feel stress (tense, anxious, or unable to sleep) To some extent   (!) STRESS CONCERN      6/8/2024   Nutrition   Diet: Regular (no restrictions)         6/8/2024   Exercise   Days per week of moderate/strenous exercise 0 days   Average minutes spent exercising at this level Patient declined   (!) EXERCISE CONCERN      6/8/2024   Social Factors   Frequency of gathering with friends or relatives Once a week   Worry food won't last until get money to buy more No   Food not last or not have enough money for food? No   Do you have housing?  Yes   Are you worried about losing your housing? No   Lack of transportation? No   Unable to get utilities (heat,electricity)? No          2024   Fall Risk   Fallen 2 or more times in the past year? No   Trouble with walking or balance? No          2024   Activities of Daily Living- Home Safety   Needs help with the following daily activites Shopping    Preparing meals    Housework   Safety concerns in the home None of the above         2024   Dental   Dentist two times every year? (!) NO         2024   Hearing Screening   Hearing concerns? None of the above         2024   Driving Risk Screening   Patient/family members have concerns about driving (!) YES          2024   General Alertness/Fatigue Screening   Have you been more tired than usual lately? (!) YES         2024   Urinary Incontinence Screening   Bothered by leaking urine in past 6 months Yes          Today's PHQ-9 Score:       2024     2:50 PM   PHQ-9 SCORE   PHQ-9 Total Score MyChart 15 (Moderately severe depression)   PHQ-9 Total Score 15         2024   Substance Use   Alcohol more than 3/day or more than 7/wk No   Do you have a current opioid prescription? No   How severe/bad is pain from 1 to 10? 6/10   Do you use any other substances recreationally? No     Social History     Tobacco Use    Smoking status: Former     Current packs/day: 0.00     Types: Cigarettes     Start date: 1982     Quit date: 2019     Years since quittin.7    Smokeless tobacco: Never   Vaping Use    Vaping status: Never Used   Substance Use Topics    Alcohol use: Not Currently     Comment: Rarely    Drug use: No           2022   LAST FHS-7 RESULTS   1st degree relative breast or ovarian cancer No   Any relative bilateral breast cancer No   Any male have breast cancer No   Any ONE woman have BOTH breast AND ovarian cancer No   Any woman with breast cancer before 50yrs No   2 or more relatives with breast AND/OR ovarian cancer No   2 or more relatives with breast AND/OR bowel cancer No         Paps utd        Latest Ref Rng & Units 2022     1:08 PM 2016     12:00 PM 9/24/2016    12:00 AM   PAP / HPV   PAP  Negative for Intraepithelial Lesion or Malignancy (NILM)      PAP (Historical)    NIL    HPV 16 DNA Negative Negative  Negative     HPV 18 DNA Negative Negative  Negative     Other HR HPV Negative Negative  Negative       ASCVD Risk   The ASCVD Risk score (Bernadine IRBY, et al., 2019) failed to calculate for the following reasons:    The patient has a prior MI or stroke diagnosis      Reviewed and updated as needed this visit by Provider                      Current providers sharing in care for this patient include:  Patient Care Team:  Anjel Busby MD as PCP - General (Family Practice)  Georgina Richardson MD as MD (Pulmonary Disease)  Anjel Busby MD as MD (Family Practice)  Anjel Chris MD as MD (Family Medicine - Sports Medicine)  Tashi Saxena MD as MD (Family Medicine - Sports Medicine)  Everett Austin MD as MD (Rheumatology)  Anay Herbert, RN as Specialty Care Coordinator (Cardiology)  Marques Gaspar MD as MD (Clinical Cardiac Electrophysiology)  Everett Austin MD as Assigned Rheumatology Provider  Anjel Busby MD as Assigned PCP  Hyun Mcneal APRN CNM as Certified Nurse Midwife (OB/Gyn)  Serge Butler MD as MD (Internal Medicine)  Serge Butler MD as MD (Internal Medicine)  Nancy Ignacio MD as Anesthesiologist (Pain Medicine)  Teofilo Armenta MD as MD (Dermatology)  Tashi Joy DO as Assigned Musculoskeletal Provider  Mindi Murry DANII as Pharmacist    The following health maintenance items are reviewed in Epic and correct as of today:  Health Maintenance   Topic Date Due    HF ACTION PLAN  Never done    ANNUAL REVIEW OF HM ORDERS  Never done    URINE DRUG SCREEN  09/10/2020    RSV VACCINE (Pregnancy & 60+) (1 - 1-dose 60+ series) Never done    ADVANCE CARE PLANNING  05/15/2022    MEDICARE ANNUAL WELLNESS VISIT  05/24/2023    COVID-19 Vaccine (8 - 2023-24 season) 09/01/2023  "   LUNG CANCER SCREENING  02/04/2024    Pneumococcal Vaccine: Pediatrics (0 to 5 Years) and At-Risk Patients (6 to 64 Years) (3 of 3 - PPSV23 or PCV20) 05/23/2024    MAMMO SCREENING  06/20/2024    LIPID  07/20/2024    BMP  07/26/2024    INFLUENZA VACCINE (Season Ended) 09/01/2024    PHQ-9  09/13/2024    ALT  01/26/2025    CBC  01/26/2025    DTAP/TDAP/TD IMMUNIZATION (3 - Td or Tdap) 08/21/2025    GLUCOSE  01/26/2027    HPV TEST  06/20/2027    PAP  06/20/2027    COLORECTAL CANCER SCREENING  07/16/2028    TSH W/FREE T4 REFLEX  Completed    HEPATITIS C SCREENING  Completed    HIV SCREENING  Completed    PHQ-2 (once per calendar year)  Completed    ZOSTER IMMUNIZATION  Completed    IPV IMMUNIZATION  Aged Out    HPV IMMUNIZATION  Aged Out    MENINGITIS IMMUNIZATION  Aged Out    RSV MONOCLONAL ANTIBODY  Aged Out            Objective    Exam  /76 (BP Location: Right arm, Patient Position: Sitting, Cuff Size: Adult Regular)   Pulse 76   Wt 130.1 kg (286 lb 14.4 oz)   SpO2 96%   BMI 46.31 kg/m     Estimated body mass index is 46.31 kg/m  as calculated from the following:    Height as of 10/26/23: 1.676 m (5' 6\").    Weight as of this encounter: 130.1 kg (286 lb 14.4 oz).    Physical Exam  GENERAL: alert and no distress  NECK: no adenopathy, no asymmetry, masses, or scars  RESP: lungs clear to auscultation - no rales, rhonchi or wheezes  CV: regular rate and rhythm, normal S1 S2, no S3 or S4, no murmur, click or rub, no peripheral edema  ABDOMEN: soft, nontender, no hepatosplenomegaly, no masses and bowel sounds normal  MS: no gross musculoskeletal defects noted, no edema  Normal clock draw and thre word recall    Signed Electronically by: Anjel Busby MD    Answers submitted by the patient for this visit:  Patient Health Questionnaire (Submitted on 6/12/2024)  If you checked off any problems, how difficult have these problems made it for you to do your work, take care of things at home, or get along with " other people?: Somewhat difficult  PHQ9 TOTAL SCORE: 15

## 2024-06-17 ENCOUNTER — TELEPHONE (OUTPATIENT)
Dept: FAMILY MEDICINE | Facility: CLINIC | Age: 64
End: 2024-06-17
Payer: COMMERCIAL

## 2024-06-20 ENCOUNTER — LAB (OUTPATIENT)
Dept: LAB | Facility: CLINIC | Age: 64
End: 2024-06-20
Payer: COMMERCIAL

## 2024-06-20 DIAGNOSIS — M06.00 RHEUMATOID ARTHRITIS WITH NEGATIVE RHEUMATOID FACTOR, INVOLVING UNSPECIFIED SITE (H): ICD-10-CM

## 2024-06-20 DIAGNOSIS — Z79.899 HIGH RISK MEDICATION USE: Primary | ICD-10-CM

## 2024-06-20 DIAGNOSIS — I26.99 PULMONARY EMBOLISM, UNSPECIFIED CHRONICITY, UNSPECIFIED PULMONARY EMBOLISM TYPE, UNSPECIFIED WHETHER ACUTE COR PULMONALE PRESENT (H): ICD-10-CM

## 2024-06-20 LAB
ALBUMIN SERPL BCG-MCNC: 3.6 G/DL (ref 3.5–5.2)
ALP SERPL-CCNC: 55 U/L (ref 40–150)
ALT SERPL W P-5'-P-CCNC: 22 U/L (ref 0–50)
ANION GAP SERPL CALCULATED.3IONS-SCNC: 11 MMOL/L (ref 7–15)
AST SERPL W P-5'-P-CCNC: 23 U/L (ref 0–45)
BILIRUB SERPL-MCNC: 0.2 MG/DL
BUN SERPL-MCNC: 34.1 MG/DL (ref 8–23)
CALCIUM SERPL-MCNC: 9.4 MG/DL (ref 8.8–10.2)
CHLORIDE SERPL-SCNC: 108 MMOL/L (ref 98–107)
CREAT SERPL-MCNC: 1.4 MG/DL (ref 0.51–0.95)
CRP SERPL-MCNC: <3 MG/L
DEPRECATED HCO3 PLAS-SCNC: 21 MMOL/L (ref 22–29)
EGFRCR SERPLBLD CKD-EPI 2021: 42 ML/MIN/1.73M2
ERYTHROCYTE [DISTWIDTH] IN BLOOD BY AUTOMATED COUNT: 13.6 % (ref 10–15)
ERYTHROCYTE [SEDIMENTATION RATE] IN BLOOD BY WESTERGREN METHOD: 54 MM/HR (ref 0–30)
GLUCOSE SERPL-MCNC: 89 MG/DL (ref 70–99)
HCT VFR BLD AUTO: 37.2 % (ref 35–47)
HGB BLD-MCNC: 12.5 G/DL (ref 11.7–15.7)
INR PPP: 2.94 (ref 0.85–1.15)
MCH RBC QN AUTO: 31 PG (ref 26.5–33)
MCHC RBC AUTO-ENTMCNC: 33.6 G/DL (ref 31.5–36.5)
MCV RBC AUTO: 92 FL (ref 78–100)
PLATELET # BLD AUTO: 303 10E3/UL (ref 150–450)
POTASSIUM SERPL-SCNC: 4.6 MMOL/L (ref 3.4–5.3)
PROT SERPL-MCNC: 7 G/DL (ref 6.4–8.3)
RBC # BLD AUTO: 4.03 10E6/UL (ref 3.8–5.2)
SODIUM SERPL-SCNC: 140 MMOL/L (ref 135–145)
TOTAL PROTEIN SERUM FOR ELP: 6.6 G/DL (ref 6.4–8.3)
WBC # BLD AUTO: 7 10E3/UL (ref 4–11)

## 2024-06-20 PROCEDURE — 84165 PROTEIN E-PHORESIS SERUM: CPT | Performed by: PATHOLOGY

## 2024-06-20 PROCEDURE — 85027 COMPLETE CBC AUTOMATED: CPT

## 2024-06-20 PROCEDURE — 85652 RBC SED RATE AUTOMATED: CPT

## 2024-06-20 PROCEDURE — 86140 C-REACTIVE PROTEIN: CPT

## 2024-06-20 PROCEDURE — 80053 COMPREHEN METABOLIC PANEL: CPT

## 2024-06-20 PROCEDURE — 84155 ASSAY OF PROTEIN SERUM: CPT

## 2024-06-20 PROCEDURE — 85610 PROTHROMBIN TIME: CPT

## 2024-06-20 PROCEDURE — 36415 COLL VENOUS BLD VENIPUNCTURE: CPT

## 2024-06-21 ENCOUNTER — ANTICOAGULATION THERAPY VISIT (OUTPATIENT)
Dept: ANTICOAGULATION | Facility: CLINIC | Age: 64
End: 2024-06-21
Payer: COMMERCIAL

## 2024-06-21 DIAGNOSIS — I26.99 PULMONARY EMBOLI (H): Primary | ICD-10-CM

## 2024-06-21 DIAGNOSIS — Z79.01 LONG TERM CURRENT USE OF ANTICOAGULANT THERAPY: ICD-10-CM

## 2024-06-21 LAB
ALBUMIN SERPL ELPH-MCNC: 3.4 G/DL (ref 3.7–5.1)
ALPHA1 GLOB SERPL ELPH-MCNC: 0.3 G/DL (ref 0.2–0.4)
ALPHA2 GLOB SERPL ELPH-MCNC: 0.9 G/DL (ref 0.5–0.9)
B-GLOBULIN SERPL ELPH-MCNC: 0.8 G/DL (ref 0.6–1)
GAMMA GLOB SERPL ELPH-MCNC: 1.2 G/DL (ref 0.7–1.6)
M PROTEIN SERPL ELPH-MCNC: 0.2 G/DL
PATH REPORT.COMMENTS IMP SPEC: ABNORMAL
PROT PATTERN SERPL ELPH-IMP: ABNORMAL

## 2024-06-21 NOTE — PROGRESS NOTES
ANTICOAGULATION MANAGEMENT     Shira Rodriguez 64 year old female is on warfarin with therapeutic INR result. (Goal INR 2.0-3.0)    Recent labs: (last 7 days)     06/20/24  1456   INR 2.94*       ASSESSMENT     Source(s): Chart Review and Patient/Caregiver Call     Warfarin doses taken: Warfarin taken as instructed  Diet: No new diet changes identified  Medication/supplement changes: None noted  New illness, injury, or hospitalization: No  Signs or symptoms of bleeding or clotting: No  Previous result: Subtherapeutic  Additional findings: None       PLAN     Recommended plan for no diet, medication or health factor changes affecting INR     Dosing Instructions: Continue your current warfarin dose with next INR in 2 weeks       Summary  As of 6/21/2024      Full warfarin instructions:  10 mg every Sun, Thu; 12.5 mg all other days   Next INR check:  7/5/2024               Telephone call with Shira who verbalizes understanding and agrees to plan    Patient offered & declined to schedule next visit    Education provided:   Contact [phone] with any changes, questions or concerns.     Plan made per [facility] anticoagulation protocol    Tea Deng RN  Anticoagulation Clinic  6/21/2024    _______________________________________________________________________     Anticoagulation Episode Summary       Current INR goal:  2.0-3.0   TTR:  46.3% (1 y)   Target end date:  Indefinite   Send INR reminders to:  [facility]    Indications    Pulmonary emboli (H) [I26.99]  Long term current use of anticoagulant therapy [Z79.01]             Comments:               Anticoagulation Care Providers       Provider Role Specialty Phone number    Anjel Busby MD Referring Family Medicine [phone]

## 2024-06-24 DIAGNOSIS — Z79.899 ENCOUNTER FOR LONG-TERM (CURRENT) USE OF MEDICATIONS: Primary | ICD-10-CM

## 2024-07-11 ENCOUNTER — LAB (OUTPATIENT)
Dept: LAB | Facility: CLINIC | Age: 64
End: 2024-07-11
Payer: COMMERCIAL

## 2024-07-11 ENCOUNTER — ANTICOAGULATION THERAPY VISIT (OUTPATIENT)
Dept: ANTICOAGULATION | Facility: CLINIC | Age: 64
End: 2024-07-11

## 2024-07-11 DIAGNOSIS — Z79.01 LONG TERM CURRENT USE OF ANTICOAGULANT THERAPY: ICD-10-CM

## 2024-07-11 DIAGNOSIS — Z79.899 ENCOUNTER FOR LONG-TERM (CURRENT) USE OF MEDICATIONS: ICD-10-CM

## 2024-07-11 DIAGNOSIS — I26.99 PULMONARY EMBOLI (H): Primary | ICD-10-CM

## 2024-07-11 DIAGNOSIS — I26.99 PULMONARY EMBOLI (H): ICD-10-CM

## 2024-07-11 LAB
BASOPHILS # BLD AUTO: 0 10E3/UL (ref 0–0.2)
BASOPHILS NFR BLD AUTO: 1 %
EOSINOPHIL # BLD AUTO: 0.2 10E3/UL (ref 0–0.7)
EOSINOPHIL NFR BLD AUTO: 3 %
ERYTHROCYTE [DISTWIDTH] IN BLOOD BY AUTOMATED COUNT: 14 % (ref 10–15)
HBA1C MFR BLD: 5 % (ref 0–5.6)
HCT VFR BLD AUTO: 36.2 % (ref 35–47)
HGB BLD-MCNC: 12.1 G/DL (ref 11.7–15.7)
IMM GRANULOCYTES # BLD: 0 10E3/UL
IMM GRANULOCYTES NFR BLD: 0 %
INR BLD: 2 (ref 0.9–1.1)
LYMPHOCYTES # BLD AUTO: 1.1 10E3/UL (ref 0.8–5.3)
LYMPHOCYTES NFR BLD AUTO: 22 %
MCH RBC QN AUTO: 31 PG (ref 26.5–33)
MCHC RBC AUTO-ENTMCNC: 33.4 G/DL (ref 31.5–36.5)
MCV RBC AUTO: 93 FL (ref 78–100)
MONOCYTES # BLD AUTO: 0.4 10E3/UL (ref 0–1.3)
MONOCYTES NFR BLD AUTO: 7 %
NEUTROPHILS # BLD AUTO: 3.5 10E3/UL (ref 1.6–8.3)
NEUTROPHILS NFR BLD AUTO: 68 %
PLATELET # BLD AUTO: 224 10E3/UL (ref 150–450)
RBC # BLD AUTO: 3.9 10E6/UL (ref 3.8–5.2)
WBC # BLD AUTO: 5.2 10E3/UL (ref 4–11)

## 2024-07-11 PROCEDURE — 80061 LIPID PANEL: CPT

## 2024-07-11 PROCEDURE — 80053 COMPREHEN METABOLIC PANEL: CPT

## 2024-07-11 PROCEDURE — 83036 HEMOGLOBIN GLYCOSYLATED A1C: CPT

## 2024-07-11 PROCEDURE — 85025 COMPLETE CBC W/AUTO DIFF WBC: CPT

## 2024-07-11 PROCEDURE — 36415 COLL VENOUS BLD VENIPUNCTURE: CPT

## 2024-07-11 PROCEDURE — 36416 COLLJ CAPILLARY BLOOD SPEC: CPT

## 2024-07-11 PROCEDURE — 84439 ASSAY OF FREE THYROXINE: CPT

## 2024-07-11 PROCEDURE — 85610 PROTHROMBIN TIME: CPT

## 2024-07-11 PROCEDURE — 84443 ASSAY THYROID STIM HORMONE: CPT

## 2024-07-11 NOTE — PROGRESS NOTES
ANTICOAGULATION MANAGEMENT     Shira Rodriguez 64 year old female is on warfarin with therapeutic INR result. (Goal INR 2.0-3.0)    Recent labs: (last 7 days)     07/11/24  1501   INR 2.0*       ASSESSMENT     Source(s): Chart Review     Warfarin doses taken: Missed dose(s) may be affecting INR  Diet: No new diet changes identified  Medication/supplement changes: None noted  New illness, injury, or hospitalization: No  Signs or symptoms of bleeding or clotting: No  Previous result: Therapeutic last 2(+) visits  Additional findings: None       PLAN     Recommended plan for temporary change(s) affecting INR     Dosing Instructions: Continue your current warfarin dose with next INR in 3 weeks       Summary  As of 7/11/2024      Full warfarin instructions:  10 mg every Sun, Thu; 12.5 mg all other days   Next INR check:  8/1/2024               Telephone call with Shira who verbalizes understanding and agrees to plan  Sent [employer] message with dosing and follow up instructions    Lab visit scheduled    Education provided: Please call back if any changes to your diet, medications or how you've been taking warfarin    Plan made per [facility] anticoagulation protocol    Rich Lopez, RN  Anticoagulation Clinic  7/11/2024    _______________________________________________________________________     Anticoagulation Episode Summary       Current INR goal:  2.0-3.0   TTR:  46.4% (1 y)   Target end date:  Indefinite   Send INR reminders to:  [facility]    Indications    Pulmonary emboli (H) [I26.99]  Long term current use of anticoagulant therapy [Z79.01]             Comments:               Anticoagulation Care Providers       Provider Role Specialty Phone number    Anjel Busby MD Referring Family Medicine [phone]

## 2024-07-12 LAB
ALBUMIN SERPL BCG-MCNC: 3.8 G/DL (ref 3.5–5.2)
ALP SERPL-CCNC: 74 U/L (ref 40–150)
ALT SERPL W P-5'-P-CCNC: 20 U/L (ref 0–50)
ANION GAP SERPL CALCULATED.3IONS-SCNC: 10 MMOL/L (ref 7–15)
AST SERPL W P-5'-P-CCNC: 21 U/L (ref 0–45)
BILIRUB SERPL-MCNC: 0.2 MG/DL
BUN SERPL-MCNC: 32.7 MG/DL (ref 8–23)
CALCIUM SERPL-MCNC: 9.7 MG/DL (ref 8.8–10.2)
CHLORIDE SERPL-SCNC: 108 MMOL/L (ref 98–107)
CHOLEST SERPL-MCNC: 226 MG/DL
CREAT SERPL-MCNC: 1.45 MG/DL (ref 0.51–0.95)
DEPRECATED HCO3 PLAS-SCNC: 25 MMOL/L (ref 22–29)
EGFRCR SERPLBLD CKD-EPI 2021: 40 ML/MIN/1.73M2
FASTING STATUS PATIENT QL REPORTED: ABNORMAL
FASTING STATUS PATIENT QL REPORTED: ABNORMAL
GLUCOSE SERPL-MCNC: 92 MG/DL (ref 70–99)
HDLC SERPL-MCNC: 78 MG/DL
LDLC SERPL CALC-MCNC: 130 MG/DL
NONHDLC SERPL-MCNC: 148 MG/DL
POTASSIUM SERPL-SCNC: 4.9 MMOL/L (ref 3.4–5.3)
PROT SERPL-MCNC: 7.3 G/DL (ref 6.4–8.3)
SODIUM SERPL-SCNC: 143 MMOL/L (ref 135–145)
T4 FREE SERPL-MCNC: 1.2 NG/DL (ref 0.9–1.7)
TRIGL SERPL-MCNC: 89 MG/DL
TSH SERPL DL<=0.005 MIU/L-ACNC: 1.76 UIU/ML (ref 0.3–4.2)

## 2024-07-17 ENCOUNTER — VIRTUAL VISIT (OUTPATIENT)
Dept: RHEUMATOLOGY | Facility: CLINIC | Age: 64
End: 2024-07-17
Attending: INTERNAL MEDICINE
Payer: COMMERCIAL

## 2024-07-17 DIAGNOSIS — M06.00 RHEUMATOID ARTHRITIS WITH NEGATIVE RHEUMATOID FACTOR, INVOLVING UNSPECIFIED SITE (H): Primary | ICD-10-CM

## 2024-07-17 NOTE — PROGRESS NOTES
Medication Therapy Management (MTM) Encounter    ASSESSMENT:                            Medication Adherence/Access: No issues identified.    Rheumatoid arthritis: Patient has had significant improvement after > 3 months on Rinvoq. No concerning side effects or recurrent infections. LDL elevated but stable from last check. Would benefit from continuing current therapy, no changes needed at this time.    PLAN:                            Continue Rinvoq 15 mg daily. Report any significant side effects, infections, or planned surgeries to rheumatology.    Follow-up: 3 months for routine check in. Sees rheumatologist 2/6/25.    SUBJECTIVE/OBJECTIVE:                          Shira Rodriguez is a 63 year old female called for a follow up visit from 4/10/24. She was referred to me from Everett Austin MD.    Reason for visit: Rinvoq 3 month follow up.    Allergies/ADRs: Reviewed in chart  Past Medical History: Reviewed in chart  Tobacco: She reports that she quit smoking about 4 years ago. Her smoking use included cigarettes. She started smoking about 42 years ago. She has never used smokeless tobacco.  Alcohol: not assessed today    Medication Adherence/Access: No issues identified.    Rheumatoid arthritis:  Rinvoq 15 mg daily  Hydroxychloroquine 200 mg twice daily  Acetaminophen 650 mg every 6 hours as needed    Patient has been on Rinvoq for ~4 months, prior to start had severe pain in hands, fingers, wrists, feet, and elbows as well as significant morning stiffness. She reports today that her pain has significantly improved, especially in wrists and feet where it was the worst. She has not had any side effects or infections. Not currently taking any prednisone or methylprednisolone. Note intolerance to methotrexate, worked well but will be avoiding it indefinitely due to myelosuppression and mouth sores.    Component      Latest Ref Rng 7/11/2024  2:58 PM   WBC      4.0 - 11.0 10e3/uL 5.2    RBC Count      3.80 - 5.20  10e6/uL 3.90    Hemoglobin      11.7 - 15.7 g/dL 12.1    Hematocrit      35.0 - 47.0 % 36.2    MCV      78 - 100 fL 93    MCH      26.5 - 33.0 pg 31.0    MCHC      31.5 - 36.5 g/dL 33.4    RDW      10.0 - 15.0 % 14.0    Platelet Count      150 - 450 10e3/uL 224    % Neutrophils      % 68    % Lymphocytes      % 22    % Monocytes      % 7    % Eosinophils      % 3    % Basophils      % 1    % Immature Granulocytes      % 0    Absolute Neutrophils      1.6 - 8.3 10e3/uL 3.5    Absolute Lymphocytes      0.8 - 5.3 10e3/uL 1.1    Absolute Monocytes      0.0 - 1.3 10e3/uL 0.4    Absolute Eosinophils      0.0 - 0.7 10e3/uL 0.2    Absolute Basophils      0.0 - 0.2 10e3/uL 0.0    Absolute Immature Granulocytes      <=0.4 10e3/uL 0.0    Sodium      135 - 145 mmol/L 143    Potassium      3.4 - 5.3 mmol/L 4.9    Carbon Dioxide (CO2)      22 - 29 mmol/L 25    Anion Gap      7 - 15 mmol/L 10    Urea Nitrogen      8.0 - 23.0 mg/dL 32.7 (H)    Creatinine      0.51 - 0.95 mg/dL 1.45 (H)    GFR Estimate      >60 mL/min/1.73m2 40 (L)    Calcium      8.8 - 10.2 mg/dL 9.7    Chloride      98 - 107 mmol/L 108 (H)    Glucose      70 - 99 mg/dL 92    Alkaline Phosphatase      40 - 150 U/L 74    AST      0 - 45 U/L 21    ALT      0 - 50 U/L 20    Protein Total      6.4 - 8.3 g/dL 7.3    Albumin      3.5 - 5.2 g/dL 3.8    Bilirubin Total      <=1.2 mg/dL 0.2    Patient Fasting? Unknown    Cholesterol      <200 mg/dL 226 (H)    Triglycerides      <150 mg/dL 89    HDL Cholesterol      >=50 mg/dL 78    LDL Cholesterol Calculated      <=100 mg/dL 130 (H)    Non HDL Cholesterol      <130 mg/dL 148 (H)       Today's Vitals: There were no vitals taken for this visit.  ----------------    I spent 15 minutes with this patient today. All changes were made via collaborative practice agreement with Everett Austin MD. A copy of the visit note was provided to the patient's provider(s).    A summary of these recommendations was sent via [employer].    Mindi  Jeanmarie, PharmD  Medication Therapy Management Pharmacist  [facility]     Telemedicine Visit Details  Type of service:  Telephone visit  Start Time: 1400  End Time: 1415     Medication Therapy Recommendations  No medication therapy recommendations to display

## 2024-07-17 NOTE — LETTER
7/17/2024       RE: Shira Rodriguez  [address]  [place] [facility] [place] [address]     Dear Colleague,    Thank you for referring your patient, Shira Rodriguez, to the [facility] [place] [facility]. Please see a copy of my visit note below.    Medication Therapy Management (MTM) Encounter    ASSESSMENT:                            Medication Adherence/Access: No issues identified.    Rheumatoid arthritis: Patient has had significant improvement after > 3 months on Rinvoq. No concerning side effects or recurrent infections. LDL elevated but stable from last check. Would benefit from continuing current therapy, no changes needed at this time.    PLAN:                            Continue Rinvoq 15 mg daily. Report any significant side effects, infections, or planned surgeries to rheumatology.    Follow-up: 3 months for routine check in. Sees rheumatologist 2/6/25.    SUBJECTIVE/OBJECTIVE:                          Shira Rodriguez is a 63 year old female called for a follow up visit from 4/10/24. She was referred to me from Everett Austin MD.    Reason for visit: Rinvoq 3 month follow up.    Allergies/ADRs: Reviewed in chart  Past Medical History: Reviewed in chart  Tobacco: She reports that she quit smoking about 4 years ago. Her smoking use included cigarettes. She started smoking about 42 years ago. She has never used smokeless tobacco.  Alcohol: not assessed today    Medication Adherence/Access: No issues identified.    Rheumatoid arthritis:  Rinvoq 15 mg daily  Hydroxychloroquine 200 mg twice daily  Acetaminophen 650 mg every 6 hours as needed    Patient has been on Rinvoq for ~4 months, prior to start had severe pain in hands, fingers, wrists, feet, and elbows as well as significant morning stiffness. She reports today that her pain has significantly improved, especially in wrists and feet where it was the worst. She has  not had any side effects or infections. Not currently taking any prednisone or methylprednisolone. Note intolerance to methotrexate, worked well but will be avoiding it indefinitely due to myelosuppression and mouth sores.    Component      Latest Ref Rn 7/11/2024  2:58 PM   WBC      4.0 - 11.0 10e3/uL 5.2    RBC Count      3.80 - 5.20 10e6/uL 3.90    Hemoglobin      11.7 - 15.7 g/dL 12.1    Hematocrit      35.0 - 47.0 % 36.2    MCV      78 - 100 fL 93    MCH      26.5 - 33.0 pg 31.0    MCHC      31.5 - 36.5 g/dL 33.4    RDW      10.0 - 15.0 % 14.0    Platelet Count      150 - 450 10e3/uL 224    % Neutrophils      % 68    % Lymphocytes      % 22    % Monocytes      % 7    % Eosinophils      % 3    % Basophils      % 1    % Immature Granulocytes      % 0    Absolute Neutrophils      1.6 - 8.3 10e3/uL 3.5    Absolute Lymphocytes      0.8 - 5.3 10e3/uL 1.1    Absolute Monocytes      0.0 - 1.3 10e3/uL 0.4    Absolute Eosinophils      0.0 - 0.7 10e3/uL 0.2    Absolute Basophils      0.0 - 0.2 10e3/uL 0.0    Absolute Immature Granulocytes      <=0.4 10e3/uL 0.0    Sodium      135 - 145 mmol/L 143    Potassium      3.4 - 5.3 mmol/L 4.9    Carbon Dioxide (CO2)      22 - 29 mmol/L 25    Anion Gap      7 - 15 mmol/L 10    Urea Nitrogen      8.0 - 23.0 mg/dL 32.7 (H)    Creatinine      0.51 - 0.95 mg/dL 1.45 (H)    GFR Estimate      >60 mL/min/1.73m2 40 (L)    Calcium      8.8 - 10.2 mg/dL 9.7    Chloride      98 - 107 mmol/L 108 (H)    Glucose      70 - 99 mg/dL 92    Alkaline Phosphatase      40 - 150 U/L 74    AST      0 - 45 U/L 21    ALT      0 - 50 U/L 20    Protein Total      6.4 - 8.3 g/dL 7.3    Albumin      3.5 - 5.2 g/dL 3.8    Bilirubin Total      <=1.2 mg/dL 0.2    Patient Fasting? Unknown    Cholesterol      <200 mg/dL 226 (H)    Triglycerides      <150 mg/dL 89    HDL Cholesterol      >=50 mg/dL 78    LDL Cholesterol Calculated      <=100 mg/dL 130 (H)    Non HDL Cholesterol      <130 mg/dL 148 (H)        Today's Vitals: There were no vitals taken for this visit.  ----------------    I spent 15 minutes with this patient today. All changes were made via collaborative practice agreement with Everett Austin MD. A copy of the visit note was provided to the patient's provider(s).    A summary of these recommendations was sent via [employer].    Mindi Murry, PharmD  Medication Therapy Management Pharmacist  [facility]     Telemedicine Visit Details  Type of service:  Telephone visit  Start Time: 1400  End Time: 1415     Medication Therapy Recommendations  No medication therapy recommendations to display      Again, thank you for allowing me to participate in the care of your patient.      Sincerely,    Mindi Murry, [facility]

## 2024-07-18 NOTE — PATIENT INSTRUCTIONS
"Recommendations from today's MTM visit:                                                      Continue Rinvoq 15 mg daily. Report any significant side effects, infections, or planned surgeries to rheumatology.    Follow-up: 3 months for routine check in. Sees rheumatologist 2/6/25.    It was great speaking with you today.  I value your experience and would be very thankful for your time in providing feedback in our clinic survey. In the next few days, you may receive an email or text message from [facility] [employer] with a link to a survey related to your  clinical pharmacist.\"     To schedule another MTM appointment, please call the clinic directly or you may call the MTM scheduling line at [phone] or toll-free at [phone].     My Clinical Pharmacist's contact information:                                                      Please feel free to contact me with any questions or concerns you have.      Mindi Murry, PharmD  Medication Therapy Management Pharmacist  [facility]    "

## 2024-08-08 ENCOUNTER — ANTICOAGULATION THERAPY VISIT (OUTPATIENT)
Dept: ANTICOAGULATION | Facility: CLINIC | Age: 64
End: 2024-08-08

## 2024-08-08 ENCOUNTER — LAB (OUTPATIENT)
Dept: LAB | Facility: CLINIC | Age: 64
End: 2024-08-08
Payer: COMMERCIAL

## 2024-08-08 DIAGNOSIS — Z79.01 LONG TERM CURRENT USE OF ANTICOAGULANT THERAPY: ICD-10-CM

## 2024-08-08 DIAGNOSIS — I26.99 PULMONARY EMBOLI (H): Primary | ICD-10-CM

## 2024-08-08 DIAGNOSIS — I26.99 PULMONARY EMBOLI (H): ICD-10-CM

## 2024-08-08 LAB — INR BLD: 2.8 (ref 0.9–1.1)

## 2024-08-08 PROCEDURE — 85610 PROTHROMBIN TIME: CPT

## 2024-08-08 PROCEDURE — 36416 COLLJ CAPILLARY BLOOD SPEC: CPT

## 2024-08-08 NOTE — PROGRESS NOTES
ANTICOAGULATION MANAGEMENT     Shira Rodriguez 64 year old female is on warfarin with therapeutic INR result. (Goal INR 2.0-3.0)    Recent labs: (last 7 days)     08/08/24  1515   INR 2.8*       ASSESSMENT     Source(s): Chart Review and Patient/Caregiver Call     Warfarin doses taken: Warfarin taken as instructed  Diet: No new diet changes identified  Medication/supplement changes: None noted  New illness, injury, or hospitalization: No  Signs or symptoms of bleeding or clotting: No  Previous result: Therapeutic last 2(+) visits  Additional findings: None       PLAN     Recommended plan for no diet, medication or health factor changes affecting INR     Dosing Instructions: Continue your current warfarin dose with next INR in 4 weeks       Summary  As of 8/8/2024      Full warfarin instructions:  10 mg every Sun, Thu; 12.5 mg all other days   Next INR check:  9/5/2024               Telephone call with Shira who verbalizes understanding and agrees to plan and who agrees to plan and repeated back plan correctly    Lab visit scheduled    Education provided: Contact [phone] with any changes, questions or concerns.     Plan made per [facility] anticoagulation protocol    Tea Deng RN  Anticoagulation Clinic  8/8/2024    _______________________________________________________________________     Anticoagulation Episode Summary       Current INR goal:  2.0-3.0   TTR:  52.4% (1 y)   Target end date:  Indefinite   Send INR reminders to:  [facility]    Indications    Pulmonary emboli (H) [I26.99]  Long term current use of anticoagulant therapy [Z79.01]             Comments:               Anticoagulation Care Providers       Provider Role Specialty Phone number    Anjel Busby MD Referring Family Medicine [phone]

## 2024-08-25 ENCOUNTER — HEALTH MAINTENANCE LETTER (OUTPATIENT)
Age: 64
End: 2024-08-25

## 2024-09-05 ENCOUNTER — ANTICOAGULATION THERAPY VISIT (OUTPATIENT)
Dept: ANTICOAGULATION | Facility: CLINIC | Age: 64
End: 2024-09-05

## 2024-09-05 ENCOUNTER — LAB (OUTPATIENT)
Dept: LAB | Facility: CLINIC | Age: 64
End: 2024-09-05
Payer: COMMERCIAL

## 2024-09-05 DIAGNOSIS — Z79.01 LONG TERM CURRENT USE OF ANTICOAGULANT THERAPY: ICD-10-CM

## 2024-09-05 DIAGNOSIS — I26.99 PULMONARY EMBOLI (H): Primary | ICD-10-CM

## 2024-09-05 DIAGNOSIS — I26.99 PULMONARY EMBOLI (H): ICD-10-CM

## 2024-09-05 LAB — INR BLD: 4.1 (ref 0.9–1.1)

## 2024-09-05 PROCEDURE — 36416 COLLJ CAPILLARY BLOOD SPEC: CPT

## 2024-09-05 PROCEDURE — 85610 PROTHROMBIN TIME: CPT

## 2024-09-05 NOTE — PROGRESS NOTES
ANTICOAGULATION MANAGEMENT     Shira Rodriguez 64 year old female is on warfarin with supratherapeutic INR result. (Goal INR 2.0-3.0)    Recent labs: (last 7 days)     09/05/24  1512   INR 4.1*       ASSESSMENT     Warfarin Lab Questionnaire    Warfarin Doses Last 7 Days      9/4/2024     3:49 PM   Dose in Tablet or Mg   TAB or MG? milligram (mg)     Pt Rptd Dose SUNDAY MONDAY TUESDAY WED THURS FRIDAY SATURDAY 9/4/2024   3:49 PM 10 12.5 12.5 12.5 10 12.5 12.5         9/4/2024   Warfarin Lab Questionnaire   Missed doses within past 14 days? No   Changes in diet or alcohol within past 14 days? No   Medication changes since last result? No   Injuries or illness since last result? No   New shortness of breath, severe headaches or sudden changes in vision since last result? No   Abnormal bleeding since last result? No   Upcoming surgery, procedure? No   Best number to call with results? [phone]        Previous result: Therapeutic last 2(+) visits  Additional findings:  Shira does not feel comfortable with decreasing 10-15% per [facility] protocol. She agrees to a 9.1% decrease.        PLAN     Recommended plan for no diet, medication or health factor changes affecting INR     Dosing Instructions: decrease your warfarin dose (9.1% change) with next INR in 10 days  recommended, Shira states she is able to recheck in 14 days/2 weeks.     Summary  As of 9/5/2024      Full warfarin instructions:  12.5 mg every Sun, Wed; 10 mg all other days   Next INR check:  9/19/2024               Telephone call with Shira who agrees to plan and repeated back plan correctly    Lab visit scheduled    Education provided: Goal range and lab monitoring: goal range and significance of current result and Importance of following up at instructed interval  Symptom monitoring: monitoring for bleeding signs and symptoms and when to seek medical attention/emergency care  Contact [phone] with any changes, questions or concerns.     Plan made  per [facility] anticoagulation protocol    YUNIER DAVIDSON RN  Anticoagulation Clinic  9/5/2024    _______________________________________________________________________     Anticoagulation Episode Summary       Current INR goal:  2.0-3.0   TTR:  53.5% (1 y)   Target end date:  Indefinite   Send INR reminders to:   [facility] CL[facility]IC    Indications    Pulmonary emboli (H) [I26.99]  Long term current use of anticoagulant therapy [Z79.01]             Comments:               Anticoagulation Care Providers       Provider Role Specialty Phone number    Anjel Busby MD Referring Family Medicine [phone]

## 2024-09-11 DIAGNOSIS — I27.82 CHRONIC PULMONARY EMBOLISM, UNSPECIFIED PULMONARY EMBOLISM TYPE, UNSPECIFIED WHETHER ACUTE COR PULMONALE PRESENT (H): ICD-10-CM

## 2024-09-11 RX ORDER — WARFARIN SODIUM 5 MG/1
TABLET ORAL
Qty: 210 TABLET | Refills: 1 | Status: SHIPPED | OUTPATIENT
Start: 2024-09-11

## 2024-09-11 NOTE — TELEPHONE ENCOUNTER
ANTICOAGULATION MANAGEMENT:  Medication Refill    Anticoagulation Summary  As of 9/5/2024      Warfarin maintenance plan:  12.5 mg (5 mg x 2.5) every Sun, Wed; 10 mg (5 mg x 2) all other days   Next INR check:  9/19/2024   Target end date:  Indefinite    Indications    Pulmonary emboli (H) [I26.99]  Long term current use of anticoagulant therapy [Z79.01]                 Anticoagulation Care Providers       Provider Role Specialty Phone number    Anjel Busby MD Referring Family Medicine [phone]            Refill Criteria    Visit with referring provider/group: Meets criteria: visit within referring provider group in the last 15 months on 6/13/24    [facility] referral last signed: 02/13/2024; within last year: Yes    Lab monitoring not exceeding 2 weeks overdue: Yes    Shira meets all criteria for refill. Rx instructions and quantity supplied updated to match patient's current dosing plan. Warfarin 90 day supply with 1 refill granted per [facility] protocol     YUNIER DAVIDSON RN  Anticoagulation Clinic

## 2024-09-19 ENCOUNTER — ANTICOAGULATION THERAPY VISIT (OUTPATIENT)
Dept: ANTICOAGULATION | Facility: CLINIC | Age: 64
End: 2024-09-19

## 2024-09-19 ENCOUNTER — LAB (OUTPATIENT)
Dept: LAB | Facility: CLINIC | Age: 64
End: 2024-09-19
Payer: COMMERCIAL

## 2024-09-19 DIAGNOSIS — I26.99 PULMONARY EMBOLI (H): Primary | ICD-10-CM

## 2024-09-19 DIAGNOSIS — I26.99 PULMONARY EMBOLI (H): ICD-10-CM

## 2024-09-19 DIAGNOSIS — Z79.01 LONG TERM CURRENT USE OF ANTICOAGULANT THERAPY: ICD-10-CM

## 2024-09-19 LAB — INR BLD: 2.7 (ref 0.9–1.1)

## 2024-09-19 PROCEDURE — 36416 COLLJ CAPILLARY BLOOD SPEC: CPT

## 2024-09-19 PROCEDURE — 85610 PROTHROMBIN TIME: CPT

## 2024-09-19 NOTE — PROGRESS NOTES
ANTICOAGULATION MANAGEMENT     Shira Rodriguez 64 year old female is on warfarin with therapeutic INR result. (Goal INR 2.0-3.0)    Recent labs: (last 7 days)     09/19/24  1455   INR 2.7*       ASSESSMENT     Warfarin Lab Questionnaire    Warfarin Doses Last 7 Days      9/18/2024     3:46 PM   Dose in Tablet or Mg   TAB or MG? milligram (mg)     Pt Rptd Dose SUNDAY MONDAY TUESDAY WED THURS FRIDAY SATURDAY 9/18/2024   3:46 PM 12.5 10 10 10 12.5 10 10         9/18/2024   Warfarin Lab Questionnaire   Missed doses within past 14 days? No   Changes in diet or alcohol within past 14 days? No   Medication changes since last result? No   Injuries or illness since last result? No   New shortness of breath, severe headaches or sudden changes in vision since last result? No   Abnormal bleeding since last result? No   Upcoming surgery, procedure? No   Best number to call with results? [phone]        Previous result: Supratherapeutic  Additional findings:  Updated maintenance dose to reflect how pt has been taking warfarin, does not change weekly mg amount of warfarin received.        PLAN     Recommended plan for no diet, medication or health factor changes affecting INR     Dosing Instructions: Continue your current warfarin dose with next INR in 2-3 weeks       Summary  As of 9/19/2024      Full warfarin instructions:  12.5 mg every Sun, Thu; 10 mg all other days   Next INR check:  10/11/2024               Telephone call with Shira who verbalizes understanding and agrees to plan    Lab visit scheduled    Education provided: Goal range and lab monitoring: goal range and significance of current result and Importance of therapeutic range    Plan made per [facility] anticoagulation protocol    Francis Ellis RN  9/19/2024  Anticoagulation Clinic  [employer] for routing messages: lizette FISHER [facility] patient phone line: [phone]        _______________________________________________________________________      Anticoagulation Episode Summary       Current INR goal:  2.0-3.0   TTR:  54.4% (1 y)   Target end date:  Indefinite   Send INR reminders to:  [facility]    Indications    Pulmonary emboli (H) [I26.99]  Long term current use of anticoagulant therapy [Z79.01]             Comments:               Anticoagulation Care Providers       Provider Role Specialty Phone number    Anjel Busby MD Referring Family Medicine [phone]

## 2024-09-24 NOTE — PROGRESS NOTES
Patient comes to wound clinic for wound assessment per request of dr. Cardona. She has history of a Open surgical wound due to ECMO : DISCHARGE DIAGNOSIS Pulmonary EmbolismPEA ArrestBilateral pleural effusionsVolume overloadSinus pauseHTN Sleep apnea  Left groin wound Pannus wound Clean gloves are donned and current dressing removed. Previous treatment includes: Polymem    First date of treatment: 10/18/19    Objective findings:    Location: left  groin      Open Post-operative wound: Yes    Wound measured.: 6 cm x 3 cm x 0.3 cm , Stage: Stage 3: Full thickness skin loss with subcutaneous involvement     Wound description: no sign of infection    Tenderness:mildly    Odor: none    Drainage is moderate, serosanguinous     Tunneling:  N/A      Undermining: N/A    Wound Base: granulation and slough    compared to 10/07           Subjective finding:     Pt states: doing well    Patient is assessed for discomfort which is: minimal    Today's status of the wound: initial assessment and healing    Treatment: Removal of existing dressing, Visual inspection, Cleansing with Vashe solution, Wound packing with polymem, Application of clean dressing  Non-excisional debridement (no cutting was done), however removal of debris was performed with swabs, gauze and/or wet to dry dressings     Pt received the following instructions:    Cleansing with Vashe pack , soak for 5 minutes. Primary Dressing Polymem. Secondary Dressing: gauze and tape  Secure with: tape. Frequency: daily.   Signs and symptoms of infection taught.  Patient needs to be seen 2 weeks. Treated and follow-up appointment made. Annette Smart was available for supervision of care if needed or if questions should arise and regarding plan of care.  Margot Dye RN, CWON              
no concerns

## 2024-10-11 ENCOUNTER — ANTICOAGULATION THERAPY VISIT (OUTPATIENT)
Dept: ANTICOAGULATION | Facility: CLINIC | Age: 64
End: 2024-10-11

## 2024-10-11 ENCOUNTER — LAB (OUTPATIENT)
Dept: LAB | Facility: CLINIC | Age: 64
End: 2024-10-11
Payer: COMMERCIAL

## 2024-10-11 DIAGNOSIS — I26.99 PULMONARY EMBOLI (H): ICD-10-CM

## 2024-10-11 DIAGNOSIS — I26.99 PULMONARY EMBOLI (H): Primary | ICD-10-CM

## 2024-10-11 DIAGNOSIS — Z79.01 LONG TERM CURRENT USE OF ANTICOAGULANT THERAPY: ICD-10-CM

## 2024-10-11 LAB — INR BLD: 2.5 (ref 0.9–1.1)

## 2024-10-11 PROCEDURE — 36416 COLLJ CAPILLARY BLOOD SPEC: CPT

## 2024-10-11 PROCEDURE — 85610 PROTHROMBIN TIME: CPT

## 2024-10-11 NOTE — PROGRESS NOTES
ANTICOAGULATION MANAGEMENT     Shira Rodriguez 64 year old female is on warfarin with therapeutic INR result. (Goal INR 2.0-3.0)    Recent labs: (last 7 days)     10/11/24  1457   INR 2.5*       ASSESSMENT     Warfarin Lab Questionnaire    Warfarin Doses Last 7 Days      10/10/2024     5:14 PM   Dose in Tablet or Mg   TAB or MG? milligram (mg)     Pt Rptd Dose LUCY MONDAY TUESDAY WED THURS FRIDAY SATURDAY   10/10/2024   5:14 PM 12.5 10 10 10 12.5 10 10         10/10/2024   Warfarin Lab Questionnaire   Missed doses within past 14 days? No   Changes in diet or alcohol within past 14 days? No   Medication changes since last result? No   Injuries or illness since last result? No   New shortness of breath, severe headaches or sudden changes in vision since last result? No   Abnormal bleeding since last result? No   Upcoming surgery, procedure? No   Best number to call with results? [phone]        Previous result: Therapeutic last visit; previously outside of goal range  Additional findings: None       PLAN     Recommended plan for no diet, medication or health factor changes affecting INR     Dosing Instructions: Continue your current warfarin dose with next INR in 4 weeks       Summary  As of 10/11/2024      Full warfarin instructions:  12.5 mg every Sun, Thu; 10 mg all other days   Next INR check:  11/8/2024               Telephone call with Shira who verbalizes understanding and agrees to plan    Lab visit scheduled    Education provided: Contact [phone] with any changes, questions or concerns.     Plan made per [facility] anticoagulation protocol    Rachna Rivera RN  10/11/2024  Anticoagulation Clinic  [facility] for routing messages: lizette FISHER ANTICO CLINIC  [facility] patient phone line: [phone]        _______________________________________________________________________     Anticoagulation Episode Summary       Current INR goal:  2.0-3.0   TTR:  58.2% (1 y)   Target end date:  Indefinite   Send INR reminders  to:  [facility]    Indications    Pulmonary emboli (H) [I26.99]  Long term current use of anticoagulant therapy [Z79.01]             Comments:               Anticoagulation Care Providers       Provider Role Specialty Phone number    Anjel Busby MD Referring Family Medicine [phone]

## 2024-10-21 ENCOUNTER — VIRTUAL VISIT (OUTPATIENT)
Dept: PHARMACY | Facility: CLINIC | Age: 64
End: 2024-10-21
Attending: INTERNAL MEDICINE
Payer: COMMERCIAL

## 2024-10-21 DIAGNOSIS — M06.9 RHEUMATOID ARTHRITIS, INVOLVING UNSPECIFIED SITE, UNSPECIFIED WHETHER RHEUMATOID FACTOR PRESENT (H): Primary | ICD-10-CM

## 2024-10-21 DIAGNOSIS — Z71.85 VACCINE COUNSELING: ICD-10-CM

## 2024-10-21 RX ORDER — UPADACITINIB 15 MG/1
15 TABLET, EXTENDED RELEASE ORAL DAILY
Qty: 30 TABLET | Refills: 5 | Status: SHIPPED | OUTPATIENT
Start: 2024-10-21

## 2024-10-21 NOTE — Clinical Note
Patient is very stable, appropriate for 6 month follow up. Thought I'd hand off to you for her next visit. Thanks!

## 2024-10-21 NOTE — Clinical Note
3 month med check, no recent flares or access concerns. Sees you in Feb. Will hand off to Gaby for future MTM given she is stable. Thanks!

## 2024-10-21 NOTE — PROGRESS NOTES
Medication Therapy Management (MTM) Encounter    ASSESSMENT:                            Medication Adherence/Access: No issues identified.    Rheumatoid arthritis: Patient has maintained significant improvement after > 6 months on Rinvoq and hydroxychloroquine. No concerning side effects or recurrent infections. Would benefit from continuing current therapy, no changes needed at this time. Up to date on eye exams.    Vaccine counseling: Patient is up to date on recommended vaccines per ACIP recommendations.     PLAN:                            Continue Rinvoq 15 mg daily. Report any significant side effects, infections, or planned surgeries to rheumatology.  Continue hydroxychloroquine 200 mg twice daily.    Follow-up: 6 months for routine check in. Sees rheumatologist 2/6/25.    SUBJECTIVE/OBJECTIVE:                          Shira Rodriguez is a 63 year old female called for a follow up visit from 6/6/24. She was referred to me from Everett Austin MD.    Reason for visit: Rinvoq 3 month follow up.    Allergies/ADRs: Reviewed in chart  Past Medical History: Reviewed in chart  Tobacco: She reports that she quit smoking about 5 years ago. Her smoking use included cigarettes. She started smoking about 42 years ago. She has never used smokeless tobacco.  Alcohol: not assessed today    Medication Adherence/Access: No issues identified.    Rheumatoid arthritis:  Rinvoq 15 mg daily  Hydroxychloroquine 200 mg twice daily  Acetaminophen 650 mg every 6 hours as needed    Patient has been on Rinvoq for ~6 months, prior to start had severe pain in hands, fingers, wrists, feet, and elbows as well as significant morning stiffness. Pain remains significantly improved, especially in wrists and feet where it was the worst. She has not had any side effects or infections. Not currently taking any prednisone or methylprednisolone. Note intolerance to methotrexate, worked well but will be avoiding it indefinitely due to myelosuppression  and mouth sores. She is still taking hydroxychloroquine twice daily and is up to date on eye exams. No questions or concerns today.    Vaccine counseling: Patient is open to receiving recommended vaccines.     Immunization History   Administered Date(s) Administered    COVID-19 12+ (MODERNA) 09/26/2024    COVID-19 12+ (Pfizer) 06/13/2024    COVID-19 Bivalent 12+ (Pfizer) 12/06/2022, 05/24/2023    COVID-19 MONOVALENT 12+ (Pfizer) 03/12/2021, 04/02/2021, 12/30/2021, 05/24/2022    COVID-19 Monovalent 12+ (Pfizer 2022) 05/24/2022    Influenza (IIV3) PF 10/04/2011, 10/03/2012, 09/24/2016, 10/16/2017    Influenza Vaccine 18-64 (Flublok) 09/29/2019, 10/12/2022    Influenza Vaccine >6 months,quad, PF 10/03/2018, 12/30/2021    Pneumo Conj 13-V (2010&after) 08/28/2017, 05/23/2019    Pneumococcal 23 valent 12/04/2017    TDAP (Adacel,Boostrix) 07/16/2010    TDAP Vaccine (Boostrix) 08/21/2015    Zoster recombinant adjuvanted (SHINGRIX) 06/04/2018, 05/23/2019      Today's Vitals: There were no vitals taken for this visit.  ----------------    I spent 10 minutes with this patient today. All changes were made via collaborative practice agreement with Everett Austin MD. A copy of the visit note was provided to the patient's provider(s).    A summary of these recommendations was sent via [employer].    Mindi Murry, PharmD  Medication Therapy Management Pharmacist  [facility]     Telemedicine Visit Details  Type of service:  Telephone visit  Start Time: 1430  End Time: 1440     Medication Therapy Recommendations  No medication therapy recommendations to display

## 2024-10-21 NOTE — PATIENT INSTRUCTIONS
"Recommendations from today's MTM visit:                                                      Continue Rinvoq 15 mg daily. Report any significant side effects, infections, or planned surgeries to rheumatology.  Continue hydroxychloroquine 200 mg twice daily.    Follow-up: 6 months for routine check in. Sees rheumatologist 2/6/25.    It was great speaking with you today.  I value your experience and would be very thankful for your time in providing feedback in our clinic survey. In the next few days, you may receive an email or text message from [employer] with a link to a survey related to your  clinical pharmacist.\"     To schedule another MTM appointment, please call the clinic directly or you may call the MTM scheduling line at [phone].    My Clinical Pharmacist's contact information:                                                      Please feel free to contact me with any questions or concerns you have.      Mindi Murry, PharmD  Medication Therapy Management Pharmacist  [facility]    "

## 2024-11-05 ASSESSMENT — ANXIETY QUESTIONNAIRES: GAD7 TOTAL SCORE: 5

## 2024-11-08 ENCOUNTER — LAB (OUTPATIENT)
Dept: LAB | Facility: CLINIC | Age: 64
End: 2024-11-08
Payer: COMMERCIAL

## 2024-11-08 ENCOUNTER — ANTICOAGULATION THERAPY VISIT (OUTPATIENT)
Dept: ANTICOAGULATION | Facility: CLINIC | Age: 64
End: 2024-11-08

## 2024-11-08 DIAGNOSIS — Z79.01 LONG TERM CURRENT USE OF ANTICOAGULANT THERAPY: ICD-10-CM

## 2024-11-08 DIAGNOSIS — I26.99 PULMONARY EMBOLI (H): ICD-10-CM

## 2024-11-08 DIAGNOSIS — I26.99 PULMONARY EMBOLI (H): Primary | ICD-10-CM

## 2024-11-08 LAB — INR BLD: 1.8 (ref 0.9–1.1)

## 2024-11-08 PROCEDURE — 36416 COLLJ CAPILLARY BLOOD SPEC: CPT

## 2024-11-08 PROCEDURE — 85610 PROTHROMBIN TIME: CPT

## 2024-11-08 NOTE — PROGRESS NOTES
ANTICOAGULATION MANAGEMENT     Shira Rodriguez 64 year old female is on warfarin with subtherapeutic INR result. (Goal INR 2.0-3.0)    Recent labs: (last 7 days)     11/08/24  1436   INR 1.8*       ASSESSMENT     Source(s): Chart Review and Patient/Caregiver Call     Warfarin doses taken: Warfarin taken as instructed  Diet: No new diet changes identified  Medication/supplement changes: None noted  New illness, injury, or hospitalization: No  Signs or symptoms of bleeding or clotting: No  Previous result: Therapeutic last 2(+) visits  Additional findings: None       PLAN     Recommended plan for no diet, medication or health factor changes affecting INR     Dosing Instructions: Continue your current warfarin dose with next INR in 2 weeks       Summary  As of 11/8/2024      Full warfarin instructions:  12.5 mg every Sun, Thu; 10 mg all other days   Next INR check:  11/22/2024               Telephone call with Shira who verbalizes understanding and agrees to plan    Lab visit scheduled    Education provided: Please call back if any changes to your diet, medications or how you've been taking warfarin  Goal range and lab monitoring: goal range and significance of current result, Importance of therapeutic range, and Importance of following up at instructed interval  Symptom monitoring: monitoring for clotting signs and symptoms and monitoring for stroke signs and symptoms    Plan made per [facility] anticoagulation protocol    Vickie Gabriel RN  11/8/2024  Anticoagulation Clinic  [employer] for routing messages: lizette [facility] patient phone line: [phone]        _______________________________________________________________________     Anticoagulation Episode Summary       Current INR goal:  2.0-3.0   TTR:  62.1% (1 y)   Target end date:  Indefinite   Send INR reminders to:  [facility]    Indications    Pulmonary emboli (H) [I26.99]  Long term current use of anticoagulant therapy [Z79.01]              Comments:  --             Anticoagulation Care Providers       Provider Role Specialty Phone number    Anjel Busby MD Referring Family Medicine [phone]

## 2024-12-26 ENCOUNTER — PATIENT OUTREACH (OUTPATIENT)
Dept: CARE COORDINATION | Facility: CLINIC | Age: 64
End: 2024-12-26
Payer: COMMERCIAL

## 2025-01-24 ENCOUNTER — TELEPHONE (OUTPATIENT)
Dept: RHEUMATOLOGY | Facility: CLINIC | Age: 65
End: 2025-01-24
Payer: COMMERCIAL

## 2025-01-24 NOTE — TELEPHONE ENCOUNTER
PA Initiation    Medication: RINVOQ 15 MG PO TB24  Insurance Company: Medicare Blue - Phone [phone] Fax [phone]  Pharmacy Filling the Rx: [facility] - [place], [place] - [facility] [address]  Filling Pharmacy Phone:    Filling Pharmacy Fax:    Start Date: 1/24/2025    [id number]

## 2025-01-27 NOTE — TELEPHONE ENCOUNTER
Prior Authorization Approval    Medication: RINVOQ 15 MG PO TB24  Authorization Effective Date: 1/27/2025  Authorization Expiration Date: 1/26/2026  Approved Dose/Quantity: 30  Reference #: [id number]   Insurance Company: Medicare Blue [employer] Phone [phone] Fax [phone]  Expected CoPay: $    CoPay Card Available: No    Financial Assistance Needed: yes approved through [id number]  Which Pharmacy is filling the prescription: [facility] - [place], [place] - [address]  Pharmacy Notified: no renewal  Patient Notified: yes via insurance

## 2025-03-28 DIAGNOSIS — I27.82 CHRONIC PULMONARY EMBOLISM, UNSPECIFIED PULMONARY EMBOLISM TYPE, UNSPECIFIED WHETHER ACUTE COR PULMONALE PRESENT (H): ICD-10-CM

## 2025-03-28 DIAGNOSIS — M06.00 RHEUMATOID ARTHRITIS WITH NEGATIVE RHEUMATOID FACTOR, INVOLVING UNSPECIFIED SITE (H): ICD-10-CM

## 2025-03-31 RX ORDER — WARFARIN SODIUM 5 MG/1
TABLET ORAL
Qty: 180 TABLET | Refills: 1 | Status: SHIPPED | OUTPATIENT
Start: 2025-03-31

## 2025-03-31 NOTE — TELEPHONE ENCOUNTER
ANTICOAGULATION MANAGEMENT:  Medication Refill    Anticoagulation Summary  As of 3/21/2025      Warfarin maintenance plan:  10 mg (5 mg x 2) every day   Next INR check:  4/4/2025   Target end date:  Indefinite    Indications    Pulmonary emboli (H) [I26.99]  Long term current use of anticoagulant therapy [Z79.01]                 Anticoagulation Care Providers       Provider Role Specialty Phone number    JakeAnjel nickerson MD Referring Family Medicine [phone]            Refill Criteria    Visit with referring provider/group: Meets criteria: visit within referring provider group in the last 15 months on 6/13/24    [facility] referral last signed: 02/07/2025; within last year:  Yes    Lab monitoring is up to date (not exceeding 2 weeks overdue): Yes    Shira meets all criteria for refill. Rx instructions and quantity supplied updated to match patient's current dosing plan. Warfarin 90 day supply with 1 refill granted per [facility] protocol     Francis Ellis RN  Anticoagulation Clinic

## 2025-04-04 NOTE — TELEPHONE ENCOUNTER
Medication Requested:   hydroxychloroquine (PLAQUENIL) 200 MG tablet 60 tablet 7 6/7/2024         Last Office Visit : 6/7/24    Future Office visit:  4/24/25      Routing refill request to provider for review/approval because:    Failed Rheumatology medication refill protocol:   - Last eye exam evaluating for HCQ toxicity >12mo  - No eye exam found evaluating for HCQ toxicity. Health Maintenance, Care Everywhere, Media tab reviewed.    Unable to find documention of eye exam       Creatinine   Date Value Ref Range Status   07/11/2024 1.45 (H) 0.51 - 0.95 mg/dL Final   06/16/2021 0.74 0.52 - 1.04 mg/dL Final       Request from pharmacy:  Requested Prescriptions   Pending Prescriptions Disp Refills    hydroxychloroquine (PLAQUENIL) 200 MG tablet [Pharmacy Med Name: HYDROXYCHLOROQUINE 200MG TABLETS] 60 tablet 7     Sig: TAKE 1 TABLET(200 MG) BY MOUTH TWICE DAILY       There is no refill protocol information for this order

## 2025-04-04 NOTE — TELEPHONE ENCOUNTER
"Last Written Prescription Date:  6/7/24  Last Fill Quantity: 60,  # refills: 7   Last office visit: 1/26/2024 ; last virtual visit: 7/17/2024 with prescribing provider:  Norman   Future Office Visit:  4/24/25    Per last notes 6/7/24, \"2. Continue hydroxychloroquine 200 mg 1 tablet twice daily. Need ophthalmology exam report from several months ago and recommend continued annual evaluation for retinal Plaquenil toxicity.\"    MyChart sent to the patient inquiring on eye exam.     Arina Allison RN        "

## 2025-04-07 ENCOUNTER — MYC REFILL (OUTPATIENT)
Dept: RHEUMATOLOGY | Facility: CLINIC | Age: 65
End: 2025-04-07
Payer: COMMERCIAL

## 2025-04-07 ENCOUNTER — NURSE TRIAGE (OUTPATIENT)
Dept: NURSING | Facility: CLINIC | Age: 65
End: 2025-04-07
Payer: COMMERCIAL

## 2025-04-07 DIAGNOSIS — M06.00 RHEUMATOID ARTHRITIS WITH NEGATIVE RHEUMATOID FACTOR, INVOLVING UNSPECIFIED SITE (H): ICD-10-CM

## 2025-04-07 NOTE — TELEPHONE ENCOUNTER
"    Nurse Triage SBAR    Is this a 2nd Level Triage? YES, LICENSED PRACTITIONER REVIEW IS REQUIRED    Situation: Shortness of breath with walking for one week    Background:   Pulmonary emboli (H) 9/9/19      Patient states she noted shortness of breath after walking short distance for the last week. Also her chronic cough seems worse. States it takes her about 2-3 minutes to get to her baseline breathing.   Denies chest pain, no other symptoms except cough  Pain with her PE was different, she was unable to take a deep breath.   Does not have a sat monitor or BP machine.   INR has been therapeutic.     Assessment: One week of new shortness of breath    Protocol Recommended Disposition:   Go to ED Now    Recommendation: Per protocol recommended she go to ED for evaluation. Patient stated she really does not want to go, would like message sent to clinic. Please call patient.   Advised if any new symptoms or breathing worsens to call 911.      Routed to provider/team   P[facility] ELLIS Finch, RN  [facility]/Red Flag Triage & Med Refill Team       Reason for Disposition   Any history of prior \"blood clot\" in leg or lungs    Additional Information   Negative: SEVERE difficulty breathing (e.g., struggling for each breath, speaks in single words, pulse > 120)   Negative: Breathing stopped and hasn't returned   Negative: Choking on something   Negative: Bluish (or gray) lips or face   Negative: Difficult to awaken or acting confused (e.g., disoriented, slurred speech)   Negative: Passed out (e.g., fainted, lost consciousness, blacked out and was not responding)   Negative: Wheezing started suddenly after medicine, an allergic food, or bee sting   Negative: Stridor (harsh sound while breathing in)   Negative: Slow, shallow and weak breathing   Negative: Sounds like a life-threatening emergency to the triager   Negative: Chest pain   Negative: Wheezing (high pitched whistling sound) and previous asthma " "attacks or use of asthma medicines   Negative: Breathing difficulty and within 14 days of COVID-19 EXPOSURE (close contact) with someone diagnosed with COVID-19 (e.g., COVID test positive)   Negative: Breathing difficulty and COVID-19 is widespread in the community   Negative: Breathing diffculty and only present when coughing   Negative: Breathing difficulty and only from stuffy nose   Negative: Breathing diffculty and only from stuffy nose or runny nose from common cold   Negative: MODERATE difficulty breathing (e.g., speaks in phrases, SOB even at rest, pulse 100-120) of new-onset or worse than normal   Negative: Oxygen level (e.g., pulse oximetry) 90% or lower   Negative: Wheezing can be heard across the room   Negative: Drooling or spitting out saliva (because can't swallow)    Answer Assessment - Initial Assessment Questions  1. RESPIRATORY STATUS: \"Describe your breathing?\" (e.g., wheezing, shortness of breath, unable to speak, severe coughing)       Shortness of breath  2. ONSET: \"When did this breathing problem begin?\"       One week ago  3. PATTERN \"Does the difficult breathing come and go, or has it been constant since it started?\"       intermittent  4. SEVERITY: \"How bad is your breathing?\" (e.g., mild, moderate, severe)       severe  5. RECURRENT SYMPTOM: \"Have you had difficulty breathing before?\" If Yes, ask: \"When was the last time?\" and \"What happened that time?\"       no  6. CARDIAC HISTORY: \"Do you have any history of heart disease?\" (e.g., heart attack, angina, bypass surgery, angioplasty)       Heart attack with PE  7. LUNG HISTORY: \"Do you have any history of lung disease?\"  (e.g., pulmonary embolus, asthma, emphysema)      PE-on warfarin, 6 years ago  8. CAUSE: \"What do you think is causing the breathing problem?\"       Not sure  9. OTHER SYMPTOMS: \"Do you have any other symptoms?\" (e.g., chest pain, cough, dizziness, fever, runny nose)      Cough-not new  10. O2 SATURATION MONITOR:  \"Do you " "use an oxygen saturation monitor (pulse oximeter) at home?\" If Yes, ask: \"What is your reading (oxygen level) today?\" \"What is your usual oxygen saturation reading?\" (e.g., 95%)        Does not check  11. PREGNANCY: \"Is there any chance you are pregnant?\" \"When was your last menstrual period?\"        na  12. TRAVEL: \"Have you traveled out of the country in the last month?\" (e.g., travel history, exposures)        na    Protocols used: Breathing Difficulty-A-[place]    "

## 2025-04-07 NOTE — TELEPHONE ENCOUNTER
I called the pt and coordinated scheduling for tomorrow, but in the mean time if worsens, she should go to ED.

## 2025-04-08 ENCOUNTER — ANCILLARY PROCEDURE (OUTPATIENT)
Dept: GENERAL RADIOLOGY | Facility: CLINIC | Age: 65
End: 2025-04-08
Attending: INTERNAL MEDICINE
Payer: COMMERCIAL

## 2025-04-08 ENCOUNTER — OFFICE VISIT (OUTPATIENT)
Dept: INTERNAL MEDICINE | Facility: CLINIC | Age: 65
End: 2025-04-08
Payer: COMMERCIAL

## 2025-04-08 ENCOUNTER — LAB (OUTPATIENT)
Dept: LAB | Facility: CLINIC | Age: 65
End: 2025-04-08
Payer: COMMERCIAL

## 2025-04-08 VITALS
HEIGHT: 66 IN | HEART RATE: 96 BPM | TEMPERATURE: 97.6 F | BODY MASS INDEX: 46.31 KG/M2 | OXYGEN SATURATION: 94 % | SYSTOLIC BLOOD PRESSURE: 107 MMHG | DIASTOLIC BLOOD PRESSURE: 72 MMHG | RESPIRATION RATE: 18 BRPM

## 2025-04-08 DIAGNOSIS — J90 PLEURAL EFFUSION: ICD-10-CM

## 2025-04-08 DIAGNOSIS — J81.0 ACUTE PULMONARY EDEMA (H): ICD-10-CM

## 2025-04-08 DIAGNOSIS — R06.02 EXERTIONAL SHORTNESS OF BREATH: ICD-10-CM

## 2025-04-08 DIAGNOSIS — R06.02 EXERTIONAL SHORTNESS OF BREATH: Primary | ICD-10-CM

## 2025-04-08 DIAGNOSIS — J90 BILATERAL PLEURAL EFFUSION: ICD-10-CM

## 2025-04-08 LAB
ANION GAP SERPL CALCULATED.3IONS-SCNC: 10 MMOL/L (ref 7–15)
BUN SERPL-MCNC: 28 MG/DL (ref 8–23)
CALCIUM SERPL-MCNC: 9.6 MG/DL (ref 8.8–10.4)
CHLORIDE SERPL-SCNC: 108 MMOL/L (ref 98–107)
CREAT SERPL-MCNC: 1.49 MG/DL (ref 0.51–0.95)
CRP SERPL-MCNC: 35.7 MG/L
EGFRCR SERPLBLD CKD-EPI 2021: 39 ML/MIN/1.73M2
ERYTHROCYTE [DISTWIDTH] IN BLOOD BY AUTOMATED COUNT: 13.1 % (ref 10–15)
ERYTHROCYTE [SEDIMENTATION RATE] IN BLOOD BY WESTERGREN METHOD: 55 MM/HR (ref 0–30)
FLUAV RNA SPEC QL NAA+PROBE: NEGATIVE
FLUBV RNA RESP QL NAA+PROBE: NEGATIVE
GLUCOSE SERPL-MCNC: 97 MG/DL (ref 70–99)
HCO3 SERPL-SCNC: 24 MMOL/L (ref 22–29)
HCT VFR BLD AUTO: 41.4 % (ref 35–47)
HGB BLD-MCNC: 13.1 G/DL (ref 11.7–15.7)
MCH RBC QN AUTO: 29.3 PG (ref 26.5–33)
MCHC RBC AUTO-ENTMCNC: 31.6 G/DL (ref 31.5–36.5)
MCV RBC AUTO: 93 FL (ref 78–100)
NT-PROBNP SERPL-MCNC: 1182 PG/ML (ref 0–900)
PLATELET # BLD AUTO: 249 10E3/UL (ref 150–450)
POTASSIUM SERPL-SCNC: 4.8 MMOL/L (ref 3.4–5.3)
PROCALCITONIN SERPL IA-MCNC: 0.09 NG/ML
RBC # BLD AUTO: 4.47 10E6/UL (ref 3.8–5.2)
RHEUMATOID FACT SERPL-ACNC: 51 IU/ML
RSV RNA SPEC NAA+PROBE: NEGATIVE
SARS-COV-2 RNA RESP QL NAA+PROBE: NEGATIVE
SODIUM SERPL-SCNC: 142 MMOL/L (ref 135–145)
WBC # BLD AUTO: 7.1 10E3/UL (ref 4–11)

## 2025-04-08 PROCEDURE — 85027 COMPLETE CBC AUTOMATED: CPT | Performed by: PATHOLOGY

## 2025-04-08 PROCEDURE — 86140 C-REACTIVE PROTEIN: CPT | Performed by: PATHOLOGY

## 2025-04-08 PROCEDURE — 3074F SYST BP LT 130 MM HG: CPT

## 2025-04-08 PROCEDURE — 83880 ASSAY OF NATRIURETIC PEPTIDE: CPT | Performed by: PATHOLOGY

## 2025-04-08 PROCEDURE — 3078F DIAST BP <80 MM HG: CPT

## 2025-04-08 PROCEDURE — 36415 COLL VENOUS BLD VENIPUNCTURE: CPT | Performed by: PATHOLOGY

## 2025-04-08 PROCEDURE — 71046 X-RAY EXAM CHEST 2 VIEWS: CPT | Performed by: RADIOLOGY

## 2025-04-08 PROCEDURE — 84145 PROCALCITONIN (PCT): CPT | Performed by: INTERNAL MEDICINE

## 2025-04-08 PROCEDURE — 99000 SPECIMEN HANDLING OFFICE-LAB: CPT | Performed by: PATHOLOGY

## 2025-04-08 PROCEDURE — 87637 SARSCOV2&INF A&B&RSV AMP PRB: CPT | Performed by: INTERNAL MEDICINE

## 2025-04-08 PROCEDURE — 99214 OFFICE O/P EST MOD 30 MIN: CPT | Mod: GC

## 2025-04-08 PROCEDURE — 80048 BASIC METABOLIC PNL TOTAL CA: CPT | Performed by: PATHOLOGY

## 2025-04-08 PROCEDURE — 86431 RHEUMATOID FACTOR QUANT: CPT | Performed by: INTERNAL MEDICINE

## 2025-04-08 PROCEDURE — 85652 RBC SED RATE AUTOMATED: CPT | Performed by: PATHOLOGY

## 2025-04-08 RX ORDER — FUROSEMIDE 20 MG/1
20 TABLET ORAL DAILY
Qty: 90 TABLET | Refills: 1 | Status: SHIPPED | OUTPATIENT
Start: 2025-04-08

## 2025-04-08 RX ORDER — ALBUTEROL SULFATE 90 UG/1
2 INHALANT RESPIRATORY (INHALATION) EVERY 6 HOURS PRN
Qty: 18 G | Refills: 3 | Status: SHIPPED | OUTPATIENT
Start: 2025-04-08

## 2025-04-08 RX ORDER — GUAIFENESIN 600 MG/1
600 TABLET, EXTENDED RELEASE ORAL 2 TIMES DAILY PRN
Qty: 30 TABLET | Refills: 0 | Status: SHIPPED | OUTPATIENT
Start: 2025-04-08 | End: 2025-05-08

## 2025-04-08 ASSESSMENT — PATIENT HEALTH QUESTIONNAIRE - PHQ9
SUM OF ALL RESPONSES TO PHQ QUESTIONS 1-9: 9
SUM OF ALL RESPONSES TO PHQ QUESTIONS 1-9: 9
10. IF YOU CHECKED OFF ANY PROBLEMS, HOW DIFFICULT HAVE THESE PROBLEMS MADE IT FOR YOU TO DO YOUR WORK, TAKE CARE OF THINGS AT HOME, OR GET ALONG WITH OTHER PEOPLE: SOMEWHAT DIFFICULT

## 2025-04-08 ASSESSMENT — ENCOUNTER SYMPTOMS: SHORTNESS OF BREATH: 1

## 2025-04-08 NOTE — PROGRESS NOTES
[facility]  DATE: April 8, 2025  CHIEF COMPLAINT:   Chief Complaint   Patient presents with    Shortness of Breath     Pt here to discuss shortness of breath with walking or standing x2 weeks; previous history of PE, also has worsening cough     PCP: Gaby Hernandez     Assessment & Plan     This is a 64 year old woman, history significant for unprovoked PE c/b cardiac arrest requiring VV ECMO, reactive airway disease, chronic bronchitis, and former smoker, who presents for evaluation of new exertional dyspnea of unclear etiology.    # Exertional shortness of breath  # Acute pulmonary edema (H)  # New enlarging bilateral pleural effusions  # Chronic bronchitis  # Prior unprovoked PE on Warfarin  Presented with a 2 to 3-week history of exertional dyspnea.  Differential is currently broad: Infectious (bacterial versus viral), inflammatory lung disease (given history of rheumatoid arthritis), cardiac-related  (HF, pulm HTN) although patient does not have any evidence of being volume up, acute on chronic bronchitis versus development of COPD with acute flare.  Other consideration is that patient could have developed a recurrent pleural effusion that was previously noted and had to be tapped although lung examination was not fully supportive.  Unlikely to have recurrent pulmonary embolism as patient has had therapeutic INRs.  Pursing a broad cardiopulmonary workup. Initial CXR obtained shows evidence of a new bilateral pleural effusion (right > left) and evidence of pulmonary edema. Etiology of effusion unclear but will plan to pursue thoracentesis for both therapeutic as well as diagnostic purposes. For pulmonary edema, will start patient on some diuretics and then reassess.  - Workup:  -     CBC with platelets; Future  -     Basic metabolic panel  (Ca, Cl, CO2, Creat, Gluc, K, Na, BUN); Future  -     CRP, inflammation; Future  -     ESR: Erythrocyte sedimentation rate; Future  -     Procalcitonin; Future  -      BNP-N terminal pro; Future  -     Influenza A/B, RSV and SARS-CoV2 PCR (COVID-19); Future  -     Rheumatoid factor; Future  -     Echocardiogram Limited; Future  -     General PFT Lab (Please always keep checked); Future  -     Pulmonary Function Test; Future  -     6 minute walk test; Future  -     Pleural Effusion studies:    -     Pleural fluid Aerobic Bacterial Culture Routine With Gram Stain  -     Lipase fluid; Future  -     Amylase fluid; Future  -     Cell count with differential fluid; Future  -     Protein fluid; Future  -     Cytology non gyn; Future  -     Lactate dehydrogenase fluid; Future  - Interventions:        -     US Thoracentesis; Future  -     furosemide (LASIX) 20 MG tablet; Take 1 tablet (20 mg) by mouth daily.  -     albuterol (PROAIR HFA/PROVENTIL HFA/VENTOLIN HFA) 108 (90 Base) MCG/ACT inhaler; Inhale 2 puffs into the lungs every 6 hours as needed for shortness of breath, wheezing or cough.  -     Respiratory Therapy Supplies (AEROBIKA) YASMANI; 1 each 3 times daily.  -     guaiFENesin (MUCINEX) 600 MG 12 hr tablet; Take 1 tablet (600 mg) by mouth 2 times daily as needed for congestion or cough.  - Referrals:        -     Pulmonary clinic  -     Home Care Referral  - Counseled patient that if her symptoms were to worsen then to please seek immediate medical attention at local ED      Shira was seen today for shortness of breath.    Diagnoses and all orders for this visit:    Exertional shortness of breath  -     CBC with platelets; Future  -     Basic metabolic panel  (Ca, Cl, CO2, Creat, Gluc, K, Na, BUN); Future  -     CRP, inflammation; Future  -     ESR: Erythrocyte sedimentation rate; Future  -     Influenza A/B, RSV and SARS-CoV2 PCR (COVID-19); Future  -     XR Chest 2 Views; Future  -     Rheumatoid factor; Future  -     Echocardiogram Limited; Future  -     General PFT Lab (Please always keep checked); Future  -     Pulmonary Function Test; Future  -     Respiratory Therapy Supplies  "(AEROBIKA) YASMANI; 1 each 3 times daily.  -     guaiFENesin (MUCINEX) 600 MG 12 hr tablet; Take 1 tablet (600 mg) by mouth 2 times daily as needed for congestion or cough.  -     Cancel: Physical Therapy  Referral; Future  -     BNP-N terminal pro; Future  -     6 minute walk test; Future  -     Procalcitonin; Future  -     albuterol (PROAIR HFA/PROVENTIL HFA/VENTOLIN HFA) 108 (90 Base) MCG/ACT inhaler; Inhale 2 puffs into the lungs every 6 hours as needed for shortness of breath, wheezing or cough.  -     Home Care Referral  -     Cancel: Adult Pulmonary Medicine  Referral; Future  -     Adult Pulmonary Medicine  Referral; Future  -     Influenza A/B, RSV and SARS-CoV2 PCR (COVID-19) Nasopharyngeal    Pleural effusion  -     Pleural fluid Aerobic Bacterial Culture Routine With Gram Stain  -     Lipase fluid; Future  -     Amylase fluid; Future  -     Cell count with differential fluid; Future  -     Protein fluid; Future  -     Cytology non gyn; Future  -     Lactate dehydrogenase fluid; Future  -     US Thoracentesis; Future    Acute pulmonary edema (H)  -     furosemide (LASIX) 20 MG tablet; Take 1 tablet (20 mg) by mouth daily.      Return to Clinic: 1 week    Patient discussed with Dr. Amina Cortes MD   Internal Medicine  PGY-2  [facility]   Clinic Phone\" [phone]    -----------------------------------------------------------------------------------------------------------------------------------------------------------------    Subjective     Shira Rodriguez is a 64 year old woman, who presents to the clinic for new onset exertional dyspnea. Her history significant for unprovoked PE c/b cardiac arrest requiring VV ECMO, reactive airway disease, chronic bronchitis, and former smoker.       Per chart review, patient reportedly has a chronic history of exertional dyspnea and cough.  However in the last 2 weeks, patient " reports that her dyspnea has progressively worsened along with increased cough production.  She states that ambulating a short distance makes her feel winded and even performing minimal activity such as getting into bed.  Her cough production has mainly been white in color.  Patient has not trialed any medications; previously prescribed inhaler but did not use.  Denies any orthopnea, PND, leg edema, chest pain/tightness/pressure/burning, fevers, chills or any flulike symptoms.    Patient is up-to-date on her vaccinations.  Patient is a former smoker.  Denies any recent sick contacts or long distance travels.  Patient has been compliant with her warfarin for prior PE; INR levels indicate that she has been within therapeutic levels. Patient was previously followed by pulmonary back in 2022 at which time her symptoms were defined to be related to her chronic bronchitis from her underlying smoking and potential GERD.  She was also noted to have a small pleural effusion that was tapped and deemed to be transudative in nature.  Patient has not seen pulmonary since.  Her last set of pulmonary function tests were back in 2022 with a nonobstructive pattern.    Review of Symptoms:  Constitutional, neuro, ENT, endocrine, pulmonary, cardiac, gastrointestinal, genitourinary, musculoskeletal, integument and psychiatric systems are negative, except as otherwise noted.     Updated Historical Database: Updated historical database  ---------------------------------------------  Past Medical History:   Diagnosis Date    Acute massive pulmonary embolism (H) 2019    Cardiac arrest (H) 2019    Due to PE; required ECMO    Chronic bronchitis (H) 07/30/2015    Chronic pain     Contact dermatitis     COPD (chronic obstructive pulmonary disease) (H)     Depressive disorder 1985    Eczema     HANDS    Elevated liver enzymes     H/O total knee replacement, left     History of blood transfusion     History of total hip replacement 10/27/2011     Hyperlipidemia     Hypertension     Morbid obesity (H)     Osteoarthrosis     right knee    Paroxysmal atrial fibrillation (H)     RA (rheumatoid arthritis) (H) 8/2018    Sleep apnea     Tobacco use disorder     Varicella 1965      Past Surgical History:   Procedure Laterality Date    ARTHROPLASTY KNEE  6/14/2012    Procedure: ARTHROPLASTY KNEE;  Left Total Knee Arthroplasty;  Surgeon: Annette Quesada MD;  Location: [facility] OR    ARTHROSCOPY HIP Right     BONE MARROW BIOPSY, BONE SPECIMEN, NEEDLE/TROCAR N/A 2/3/2023    Procedure: bone marrow biopsy;  Surgeon: So Horowitz MD;  Location:  GI    BRONCHOSCOPY FLEXIBLE AND RIGID N/A 9/17/2019    Procedure: Bronchoscopy flexible;  Surgeon: Patrick Rayo MD;  [facility]: [facility]    COLONOSCOPY N/A 7/16/2021    Procedure: COLONOSCOPY, WITH POLYPECTOMY AND BIOPSY;  Surgeon: Diogo Lynch MD;  Location:  GI    CV EXTRACORPERAL MEMBRANE OXYGENATION N/A 9/9/2019    Procedure: Extracorporeal Membrance Oxygenation;  Surgeon: Kaleb Mcfarlane MD;  Location:  HEART CARDIAC CATH LAB    INSERT EXTRACORPORAL MEMBRANE OXYGENATOR N/A 9/17/2019    Procedure: Venous-Venous cannulation using ultrasound guidance and transesophageal echocardiogram, venous-arterial ecmo decannulation and repair of left femoral artery;  Surgeon: Patrick Rayo MD;  Location: [facility]    IR MECH THROMB PRIM ART, NON-INTRACRNL  9/10/2019    IR PULMONARY ANGIOGRAM BILATERAL  9/10/2019    IR THROMBOLYSIS ARTERIAL INFUSION INITIAL DAY  9/10/2019    THORACENTESIS N/A 1/16/2020    Procedure: THORACENTESIS;  Surgeon: Georgina Richardson MD;  Location: [facility]    ZZC TOTAL HIP ARTHROPLASTY  07/09/04    RT      Social History     Socioeconomic History    Marital status: Single     Spouse name: Not on file    Number of children: Not on file    Years of education: Not on file    Highest education level: Not on file   Occupational History    Occupation: registered nurse     Comment:  [employer]   Tobacco Use    Smoking status: Former     Current packs/day: 0.00     Types: Cigarettes     Start date: 1982     Quit date: 2019     Years since quittin.5    Smokeless tobacco: Never   Vaping Use    Vaping status: Never Used   Substance and Sexual Activity    Alcohol use: Not Currently     Comment: Rarely    Drug use: No    Sexual activity: Not Currently     Partners: Male     Birth control/protection: Condom   Other Topics Concern    Parent/sibling w/ CABG, MI or angioplasty before 65F 55M? No   Social History Narrative    Not on file     Social Drivers of Health     Financial Resource Strain: Low Risk  (2024)    Financial Resource Strain     Within the past 12 months, have you or your family members you live with been unable to get utilities (heat, electricity) when it was really needed?: No   Food Insecurity: Low Risk  (2024)    Food Insecurity     Within the past 12 months, did you worry that your food would run out before you got money to buy more?: No     Within the past 12 months, did the food you bought just not last and you didn t have money to get more?: No   Transportation Needs: Low Risk  (2024)    Transportation Needs     Within the past 12 months, has lack of transportation kept you from medical appointments, getting your medicines, non-medical meetings or appointments, work, or from getting things that you need?: No   Physical Activity: Unknown (2024)    Exercise Vital Sign     Days of Exercise per Week: 0 days     Minutes of Exercise per Session: Patient declined   Stress: Stress Concern Present (2024)    [employer] - Occupational Stress Questionnaire     Feeling of Stress : To some extent   Social Connections: Unknown (2024)    Social Connection and Isolation Panel [NHANES]     Frequency of Communication with Friends and Family: Not on file     Frequency of Social Gatherings with Friends and Family: Once a week      Attends [employer] Services: Not on file     Active Member of Clubs or Organizations: Not on file     Attends Club or Organization Meetings: Not on file     Marital Status: Not on file   Interpersonal Safety: Low Risk  (2024)    Interpersonal Safety     Do you feel physically and emotionally safe where you currently live?: Yes     Within the past 12 months, have you been hit, slapped, kicked or otherwise physically hurt by someone?: No     Within the past 12 months, have you been humiliated or emotionally abused in other ways by your partner or ex-partner?: No   Housing Stability: Low Risk  (2024)    Housing Stability     Do you have housing? : Yes     Are you worried about losing your housing?: No      Family History   Problem Relation Age of Onset    Thyroid Disease Mother     Hypertension Mother     Skin Cancer Mother         MMohs surgery with skin graft    Cerebrovascular Disease Mother         CVA after endarderectomy-     Diabetes Mother     Depression Mother     Alcoholism Father     Heart Disease Father     Substance Abuse Father             Heart Disease Sister         multiple ablations    Alcoholism Sister     Substance Abuse Sister         Sober    Depression Sister     Substance Abuse Sister         Sober 30 plus years    Depression Sister     Thyroid Disease Sister     Hypertension Maternal Grandmother     Depression Maternal Grandmother     Breast Cancer Paternal Grandmother     Pancreatic Cancer Paternal Grandmother     Prostate Cancer Paternal Grandfather     Cardiovascular Paternal Aunt     Cardiovascular Paternal Uncle     Depression Cousin     Depression Other           -----------------------------------------------------------------------------------------------------------------------------------------------------------------      Objective     /72 (BP Location: Right arm, Patient Position: Sitting, Cuff Size: Adult Regular)   Pulse 96   Temp 97.6  F (36.4  " C) (Oral)   Resp 18   Ht 1.676 m (5' 6\")   SpO2 94%   BMI 46.31 kg/m      GENERAL: Non-toxic-appearing, Alert, comfortable, NAD  HEENT: PERRLA, EOMI,  anicteric sclera, Moist mucous membranes, Oropharynx clear  CV: RRR, normal S1 S2, no m/r/extra heart sounds  RESP: lungs clear to auscultation - no rales, rhonchi or wheezes  ABDOMEN:  soft, nontender, nondistended, +BS. No organomegaly  MSK: WWP. No pitting edema in b/l LE. No gross deformities noted  NEURO: Oriented x 3, CN II-XII intact, 5/5 motor strength in BUEs & LUEs, normal sensation throughout, 2/4 reflexes  SKIN: no suspicious lesions or rashes on exposed skin  PSYCH: Appropriate mood and affect to match      While the patient was in clinic, I reviewed the pertinent medical history and results.  I discussed the current findings on physical examination, as well as the patient s diagnosis and treatment plan with the resident and agree with the information as documented with the following exceptions: none.  Damian Tellez MD      "

## 2025-04-08 NOTE — PROGRESS NOTES
Jacky Silva is a 64 year old, presenting for the following health issues:  Shortness of Breath (Pt here to discuss shortness of breath with walking or standing x2 weeks; previous history of PE, also has worsening cough)      4/8/2025    12:00 PM   Additional Questions   Roomed by Tiny CASEY     Clinical visit completed.  Please refer to the other clinic note from the same day regarding history of visit, examination findings, and medical decision making/care plan regarding patient's acute concerns.    Amaury Cortes M.D.  Internal Medicine  PGY-2   [facility]      Signed Electronically by: Amaury Cortes MD

## 2025-04-08 NOTE — TELEPHONE ENCOUNTER
Talked to the patient.  Eye exam is not in the chart.  Patient will call the eye clinic and have the result faxed here.     Arina Allison RN

## 2025-04-08 NOTE — PATIENT INSTRUCTIONS
Shira Rodriguez, it was a pleasure seeing you in clinic today (April 8, 2025). To briefly summarize our plan moving forward:    1) We repeat chest imaging and get some preliminary blood work to look for potential causes of  your dyspnea.    2) I have prescribed you the albuterol to use for your breathing in addition to guaifenasin which will help to expel the cough.     3) I have also put in a referral for home physical and occupational therapy. They will be in contact with you to set up your appointments.    4) Please follow up with me in 1 week. If you notice that your symptoms are worsening in the interim, then please immediately go to the ED for urgent evaluation.    If you have any further questions or concerns, please call the clinic ([phone]) or send a message via the "[employer] feature.   Thank you for entrusting me with your care, and I look forward to seeing you at your next clinic visit.    Amaury Cortes MD  Internal Medicine  PGY-2  [facility]     You should receive a call to schedule your echocardiogram. However, the central imaging number is [phone] if you need it.     Lung Science [phone] (3rd Floor [facility])

## 2025-04-09 ENCOUNTER — TELEPHONE (OUTPATIENT)
Dept: PULMONOLOGY | Facility: CLINIC | Age: 65
End: 2025-04-09
Payer: COMMERCIAL

## 2025-04-09 NOTE — TELEPHONE ENCOUNTER
RECORDS STATUS - ALL OTHER DIAGNOSIS      RECORDS RECEIVED FROM: [facility]   NOTES STATUS DETAILS   OFFICE NOTE from referring provider Epic 4/8/25: Dr. Felipe Be   %MEDICATION LIST [facility]    LABS     ANYTHING RELATED TO DIAGNOSIS Epic Most recent 4/8/25   IMAGING (NEED IMAGES & REPORT)     CT SCANS PACS 2/4/23-9/9/19: CT Chest  1/29/23: CT CAP   XRAYS PACS 7/8/25-6/4/18: XR Chest

## 2025-04-09 NOTE — TELEPHONE ENCOUNTER
Called and spoke with patient.  Urgent referral received for 3-5 day.  Unable to accommodate this, but chart reviewed with both Dr. Pickard and Dr. Johnston in clinic.  Patient should have Thoracentesis done asap.  These orders already placed by patient's PCP.  Patent states her breathing is ok if just sitting, but does have sob with any activity.  Gave patient phone number to call and schedule the Thoracentesis asap.  Encouraged her that this should be done asap.  If she has more breathing issues prior to being able to get in for Thoracentesis, she should go to ED for drainage of this pleural effusion.      Patient has follow up already set up with her PCP next week.  Scheduled to be seen by Dr. Johnston in clinic on 4/21 at 3:30.

## 2025-04-10 ENCOUNTER — MEDICAL CORRESPONDENCE (OUTPATIENT)
Dept: HEALTH INFORMATION MANAGEMENT | Facility: CLINIC | Age: 65
End: 2025-04-10
Payer: COMMERCIAL

## 2025-04-10 ENCOUNTER — TELEPHONE (OUTPATIENT)
Dept: FAMILY MEDICINE | Facility: CLINIC | Age: 65
End: 2025-04-10
Payer: COMMERCIAL

## 2025-04-10 NOTE — TELEPHONE ENCOUNTER
Spoke with Anne Marie with [facility] and gave the following verbal order(s): Home Care Orders: Physical Therapy (PT): eval and treat, Occupational Therapy (OT): eval and treat, and Skilled Nursing: once a week for four weeks then every other week for four weeks.  Aura YO LPN  [facility]

## 2025-04-10 NOTE — TELEPHONE ENCOUNTER
Home Care nurse called back after getting missed call from the care team, please follow up thank you.

## 2025-04-10 NOTE — TELEPHONE ENCOUNTER
Left generic voicemail for Anne Marie to call clinic back. Voicemail message was computer automated, no individual ID, no agency information, and no confidentiality status information.  Aura YO LPN  [facility]

## 2025-04-10 NOTE — TELEPHONE ENCOUNTER
[facility] Call Center    Phone Message    May a detailed message be left on voicemail: yes     Reason for Call: Order(s): Home Care Orders: Physical Therapy (PT): eval and treat, Occupational Therapy (OT): eval and treat, and Skilled Nursing: once a week for four weeks then every other week for four weeks    Action Taken: Message routed to:  [facility] pcc    Travel Screening: Not Applicable     Date of Service:

## 2025-04-11 NOTE — TELEPHONE ENCOUNTER
Eye exam faxed received from [facility] dated 4/5/24.  No toxicity found.     Ok to send RX?     I will call the patient and inform her it still needs to be updated yearly.     Arina Allison RN

## 2025-04-12 RX ORDER — HYDROXYCHLOROQUINE SULFATE 200 MG/1
200 TABLET, FILM COATED ORAL 2 TIMES DAILY
Qty: 60 TABLET | Refills: 7 | Status: ON HOLD | OUTPATIENT
Start: 2025-04-12

## 2025-04-14 RX ORDER — HYDROXYCHLOROQUINE SULFATE 200 MG/1
200 TABLET, FILM COATED ORAL 2 TIMES DAILY
Qty: 60 TABLET | Refills: 7 | OUTPATIENT
Start: 2025-04-14

## 2025-04-15 ENCOUNTER — APPOINTMENT (OUTPATIENT)
Dept: GENERAL RADIOLOGY | Facility: CLINIC | Age: 65
DRG: 187 | End: 2025-04-15
Attending: EMERGENCY MEDICINE
Payer: COMMERCIAL

## 2025-04-15 ENCOUNTER — VIRTUAL VISIT (OUTPATIENT)
Dept: INTERNAL MEDICINE | Facility: CLINIC | Age: 65
End: 2025-04-15
Payer: COMMERCIAL

## 2025-04-15 ENCOUNTER — HOSPITAL ENCOUNTER (INPATIENT)
Facility: CLINIC | Age: 65
DRG: 187 | End: 2025-04-15
Attending: EMERGENCY MEDICINE | Admitting: HOSPITALIST
Payer: COMMERCIAL

## 2025-04-15 ENCOUNTER — TELEPHONE (OUTPATIENT)
Dept: PHARMACY | Facility: CLINIC | Age: 65
End: 2025-04-15

## 2025-04-15 DIAGNOSIS — I50.9 ACUTE DECOMPENSATED HEART FAILURE (H): Primary | ICD-10-CM

## 2025-04-15 DIAGNOSIS — I46.9 CARDIAC ARREST (H): ICD-10-CM

## 2025-04-15 DIAGNOSIS — J90 PLEURAL EFFUSION ON RIGHT: ICD-10-CM

## 2025-04-15 DIAGNOSIS — J41.1 MUCOPURULENT CHRONIC BRONCHITIS (H): ICD-10-CM

## 2025-04-15 DIAGNOSIS — J81.0 ACUTE PULMONARY EDEMA (H): ICD-10-CM

## 2025-04-15 DIAGNOSIS — R21 RASH: ICD-10-CM

## 2025-04-15 DIAGNOSIS — I50.9 ACUTE ON CHRONIC CONGESTIVE HEART FAILURE, UNSPECIFIED HEART FAILURE TYPE (H): ICD-10-CM

## 2025-04-15 DIAGNOSIS — R06.00 DYSPNEA, UNSPECIFIED TYPE: Primary | ICD-10-CM

## 2025-04-15 LAB
ALBUMIN SERPL BCG-MCNC: 3.3 G/DL (ref 3.5–5.2)
ALP SERPL-CCNC: 65 U/L (ref 40–150)
ALT SERPL W P-5'-P-CCNC: 12 U/L (ref 0–50)
ANION GAP SERPL CALCULATED.3IONS-SCNC: 10 MMOL/L (ref 7–15)
AST SERPL W P-5'-P-CCNC: 25 U/L (ref 0–45)
ATRIAL RATE - MUSE: 89 BPM
BASOPHILS # BLD AUTO: 0 10E3/UL (ref 0–0.2)
BASOPHILS NFR BLD AUTO: 0 %
BILIRUB SERPL-MCNC: 0.2 MG/DL
BUN SERPL-MCNC: 25.2 MG/DL (ref 8–23)
CALCIUM SERPL-MCNC: 9.4 MG/DL (ref 8.8–10.4)
CHLORIDE SERPL-SCNC: 102 MMOL/L (ref 98–107)
CREAT SERPL-MCNC: 1.46 MG/DL (ref 0.51–0.95)
DIASTOLIC BLOOD PRESSURE - MUSE: NORMAL MMHG
EGFRCR SERPLBLD CKD-EPI 2021: 40 ML/MIN/1.73M2
EOSINOPHIL # BLD AUTO: 0.1 10E3/UL (ref 0–0.7)
EOSINOPHIL NFR BLD AUTO: 2 %
ERYTHROCYTE [DISTWIDTH] IN BLOOD BY AUTOMATED COUNT: 13.2 % (ref 10–15)
GLUCOSE SERPL-MCNC: 103 MG/DL (ref 70–99)
HCO3 SERPL-SCNC: 25 MMOL/L (ref 22–29)
HCT VFR BLD AUTO: 37.4 % (ref 35–47)
HGB BLD-MCNC: 11.9 G/DL (ref 11.7–15.7)
IMM GRANULOCYTES # BLD: 0 10E3/UL
IMM GRANULOCYTES NFR BLD: 0 %
INR PPP: 3.04 (ref 0.85–1.15)
INTERPRETATION ECG - MUSE: NORMAL
LYMPHOCYTES # BLD AUTO: 0.7 10E3/UL (ref 0.8–5.3)
LYMPHOCYTES NFR BLD AUTO: 10 %
MAGNESIUM SERPL-MCNC: 2.1 MG/DL (ref 1.7–2.3)
MCH RBC QN AUTO: 28.9 PG (ref 26.5–33)
MCHC RBC AUTO-ENTMCNC: 31.8 G/DL (ref 31.5–36.5)
MCV RBC AUTO: 91 FL (ref 78–100)
MONOCYTES # BLD AUTO: 0.5 10E3/UL (ref 0–1.3)
MONOCYTES NFR BLD AUTO: 7 %
NEUTROPHILS # BLD AUTO: 5.6 10E3/UL (ref 1.6–8.3)
NEUTROPHILS NFR BLD AUTO: 80 %
NRBC # BLD AUTO: 0 10E3/UL
NRBC BLD AUTO-RTO: 0 /100
NT-PROBNP SERPL-MCNC: 2254 PG/ML (ref 0–900)
P AXIS - MUSE: 18 DEGREES
PLATELET # BLD AUTO: 165 10E3/UL (ref 150–450)
POTASSIUM SERPL-SCNC: 4.4 MMOL/L (ref 3.4–5.3)
PR INTERVAL - MUSE: 158 MS
PROT SERPL-MCNC: 7.6 G/DL (ref 6.4–8.3)
QRS DURATION - MUSE: 94 MS
QT - MUSE: 376 MS
QTC - MUSE: 457 MS
R AXIS - MUSE: -11 DEGREES
RBC # BLD AUTO: 4.12 10E6/UL (ref 3.8–5.2)
SODIUM SERPL-SCNC: 137 MMOL/L (ref 135–145)
SYSTOLIC BLOOD PRESSURE - MUSE: NORMAL MMHG
T AXIS - MUSE: 18 DEGREES
TROPONIN T SERPL HS-MCNC: 15 NG/L
TROPONIN T SERPL HS-MCNC: 17 NG/L
TSH SERPL DL<=0.005 MIU/L-ACNC: 2 UIU/ML (ref 0.3–4.2)
VENTRICULAR RATE- MUSE: 89 BPM
WBC # BLD AUTO: 7 10E3/UL (ref 4–11)

## 2025-04-15 PROCEDURE — 71046 X-RAY EXAM CHEST 2 VIEWS: CPT

## 2025-04-15 PROCEDURE — 99285 EMERGENCY DEPT VISIT HI MDM: CPT | Mod: 25

## 2025-04-15 PROCEDURE — 36415 COLL VENOUS BLD VENIPUNCTURE: CPT | Performed by: EMERGENCY MEDICINE

## 2025-04-15 PROCEDURE — 85004 AUTOMATED DIFF WBC COUNT: CPT | Performed by: EMERGENCY MEDICINE

## 2025-04-15 PROCEDURE — 84484 ASSAY OF TROPONIN QUANT: CPT | Performed by: EMERGENCY MEDICINE

## 2025-04-15 PROCEDURE — 120N000001 HC R&B MED SURG/OB

## 2025-04-15 PROCEDURE — 1125F AMNT PAIN NOTED PAIN PRSNT: CPT

## 2025-04-15 PROCEDURE — 83735 ASSAY OF MAGNESIUM: CPT | Performed by: EMERGENCY MEDICINE

## 2025-04-15 PROCEDURE — 84155 ASSAY OF PROTEIN SERUM: CPT | Performed by: EMERGENCY MEDICINE

## 2025-04-15 PROCEDURE — 99223 1ST HOSP IP/OBS HIGH 75: CPT | Performed by: HOSPITALIST

## 2025-04-15 PROCEDURE — 84443 ASSAY THYROID STIM HORMONE: CPT | Performed by: EMERGENCY MEDICINE

## 2025-04-15 PROCEDURE — 93005 ELECTROCARDIOGRAM TRACING: CPT

## 2025-04-15 PROCEDURE — 83880 ASSAY OF NATRIURETIC PEPTIDE: CPT | Performed by: EMERGENCY MEDICINE

## 2025-04-15 PROCEDURE — 85610 PROTHROMBIN TIME: CPT | Performed by: EMERGENCY MEDICINE

## 2025-04-15 PROCEDURE — 250N000011 HC RX IP 250 OP 636: Performed by: EMERGENCY MEDICINE

## 2025-04-15 PROCEDURE — 98005 SYNCH AUDIO-VIDEO EST LOW 20: CPT | Mod: GE

## 2025-04-15 RX ORDER — VENLAFAXINE HYDROCHLORIDE 150 MG/1
150 CAPSULE, EXTENDED RELEASE ORAL DAILY
COMMUNITY

## 2025-04-15 RX ORDER — ONDANSETRON 2 MG/ML
4 INJECTION INTRAMUSCULAR; INTRAVENOUS EVERY 6 HOURS PRN
Status: DISCONTINUED | OUTPATIENT
Start: 2025-04-15 | End: 2025-04-19 | Stop reason: HOSPADM

## 2025-04-15 RX ORDER — FUROSEMIDE 20 MG/1
40 TABLET ORAL DAILY
Status: SHIPPED
Start: 2025-04-15 | End: 2025-04-15

## 2025-04-15 RX ORDER — FUROSEMIDE 20 MG/1
20 TABLET ORAL DAILY
Status: ON HOLD | COMMUNITY
End: 2025-04-19

## 2025-04-15 RX ORDER — HYDRALAZINE HYDROCHLORIDE 20 MG/ML
10 INJECTION INTRAMUSCULAR; INTRAVENOUS EVERY 4 HOURS PRN
Status: DISCONTINUED | OUTPATIENT
Start: 2025-04-15 | End: 2025-04-19 | Stop reason: HOSPADM

## 2025-04-15 RX ORDER — AMOXICILLIN 250 MG
1 CAPSULE ORAL 2 TIMES DAILY PRN
Status: DISCONTINUED | OUTPATIENT
Start: 2025-04-15 | End: 2025-04-19 | Stop reason: HOSPADM

## 2025-04-15 RX ORDER — AMOXICILLIN 250 MG
2 CAPSULE ORAL 2 TIMES DAILY PRN
Status: DISCONTINUED | OUTPATIENT
Start: 2025-04-15 | End: 2025-04-19 | Stop reason: HOSPADM

## 2025-04-15 RX ORDER — CALCIUM CARBONATE 500 MG/1
1000 TABLET, CHEWABLE ORAL 4 TIMES DAILY PRN
Status: DISCONTINUED | OUTPATIENT
Start: 2025-04-15 | End: 2025-04-19 | Stop reason: HOSPADM

## 2025-04-15 RX ORDER — LIDOCAINE 40 MG/G
CREAM TOPICAL
Status: DISCONTINUED | OUTPATIENT
Start: 2025-04-15 | End: 2025-04-19 | Stop reason: HOSPADM

## 2025-04-15 RX ORDER — ONDANSETRON 4 MG/1
4 TABLET, ORALLY DISINTEGRATING ORAL EVERY 6 HOURS PRN
Status: DISCONTINUED | OUTPATIENT
Start: 2025-04-15 | End: 2025-04-19 | Stop reason: HOSPADM

## 2025-04-15 RX ORDER — FUROSEMIDE 10 MG/ML
60 INJECTION INTRAMUSCULAR; INTRAVENOUS ONCE
Status: COMPLETED | OUTPATIENT
Start: 2025-04-15 | End: 2025-04-15

## 2025-04-15 RX ORDER — FUROSEMIDE 10 MG/ML
40 INJECTION INTRAMUSCULAR; INTRAVENOUS
Status: DISCONTINUED | OUTPATIENT
Start: 2025-04-16 | End: 2025-04-16

## 2025-04-15 RX ADMIN — FUROSEMIDE 60 MG: 10 INJECTION, SOLUTION INTRAMUSCULAR; INTRAVENOUS at 19:03

## 2025-04-15 ASSESSMENT — ACTIVITIES OF DAILY LIVING (ADL)
ADLS_ACUITY_SCORE: 54

## 2025-04-15 NOTE — ED NOTES
[facility]  ED Nurse Handoff Report    ED Chief complaint: Shortness of Breath      ED Diagnosis:   Final diagnoses:   Pleural effusion on right   Acute on chronic congestive heart failure, unspecified heart failure type (H)       Code Status: Full Code    Allergies:   Allergies   Allergen Reactions    Adhesive Tape Blisters    Erythromycin Rash       Patient Story: Pt is a 64 year old female here for evaluation of shortness of breath. Patient was diagnosed with heart failure one week ago. She has been taking Lasix 20 mg for the past week without relief. She states walking 10 feet makes her short of breath, has felt like this over the past week, has not worsened. No shortness of breath at rest. However her cough has been worsening. She started an inhaler last week, has not helped.  She spoke with her doctor today, recommended she come in today for IV Lasix. Her doctor also ordered an echo. Patient has a walker and cane, however has been pretty much sedentary for a long time due to arthritis and deconditioning.      Focused Assessment:  Pt is awake, alert and orientated X 4    Treatments and/or interventions provided: Labs, Xray,       Patient's response to treatments and/or interventions: Pt tolerated well     To be done/followed up on inpatient unit:      Does this patient have any cognitive concerns?:  None    Activity level - Baseline/Home:  Independent  Activity Level - Current:   Unknown    Patient's Preferred language: English   Needed?: No    Isolation: None  Infection: Not Applicable  Patient tested for COVID 19 prior to admission: NO  Bariatric?: Yes    Vital Signs:   Vitals:    04/15/25 1630 04/15/25 1700   BP: 112/79    Pulse: 94 76   Resp: 20 18   Temp: 97.2  F (36.2  C)    TempSrc: Temporal    SpO2: 94% 93%       Cardiac Rhythm:     Was the PSS-3 completed:   Yes  What interventions are required if any?               Family Comments: Sister and brother in law at bedside   OBS  brochure/video discussed/provided to patient/family: No              Name of person given brochure if not patient:               Relationship to patient:     For the majority of the shift this patient's behavior was Green.   Behavioral interventions performed were .    ED NURSE PHONE NUMBER: *[phone]

## 2025-04-15 NOTE — PROGRESS NOTES
Shira is a 64 year old who is being evaluated via a billable video visit.    How would you like to obtain your AVS? MyChart  If the video visit is dropped, the invitation should be resent by: Text to cell phone: [phone]  Will anyone else be joining your video visit? No    Are refills needed on medications prescribed by this physician? NO    Assessment & Plan     4 year old woman, Her history significant for unprovoked PE c/b cardiac arrest requiring VV ECMO, reactive airway disease, chronic bronchitis, and former smoker, who was seen via video visit today for reevaluation of her acute heart failure.    # Decompensated heart failure  # Exertional shortness of breath  Patient diagnosed with acute heart failure 1 week prior as evident by exertional dyspnea, evidence of pulmonary edema on chest x-ray and elevated BNP.  Last echocardiogram done in 2023 showing a normal EF.  The patient is initially hesitant about going to the hospital for heart failure management and attempted to manage in the outpatient setting as the patient was not acutely hypoxic.  However, patient has not demonstrated improvement with her oral Lasix regimen and it is difficult to administer high doses of Lasix in outpatient setting without close monitoring.  After shared discussion with the patient, she is amenable to presenting to her local ED for inpatient evaluation for heart failure.  Increased dose of Lasix from 20 to 40 mg and encourage patient to take now emergency department later in the evening.  - GDMT:  - Diuretics: Take 40 mg lasix   - ACEi/ARB: Lisinopril 5 mg  - BB: None yet started  - SGLT2i: None yet started  - MRA: None yet started  - Devices: Not indicated  - Referral given to [facility] to establish care for heart failure    Return in about 1 week (around 4/22/2025) for using a video visit, with me, Follow up.    Subjective     Shira Rodriguez is a 64 year old woman, Her history significant for unprovoked PE c/b cardiac  arrest requiring VV ECMO, reactive airway disease, chronic bronchitis, and former smoker, who was seen via video visit today for reevaluation of her exertional dyspnea.    Video Start Time: 1:33 PM    Patient was originally seen in clinic on 4/8/2025 initially for exertional dyspnea.  Comprehensive workup was consistent with an acute heart failure exacerbation with bilateral pleural effusions.  Ruled out infectious and inflammatory etiologies.  Initially discussed that acute heart failure is typically managed in the hospital setting but patient was declining hospital stay and preferred home management; patient was not acutely hypoxic so did not push for hospital admission.  Ultimately, patient was prescribed 20 mg of oral Lasix and requested to follow-up in 1 week for reevaluation.  She was also given a referral to get a thoracentesis done.    In her 1 week follow-up, patient continues to have exertional dyspnea that has unchanged from the week prior.  She has been taking her 20 mg of Lasix daily since 4/8 with only mild increase in her urination.  She also notes an increase in her phlegm production but its mainly white in color.  No fevers, chills, chest pain/tightness/pressure, lower extremity edema.  Patient had a home nursing visit today and her measured weight was around 318 pounds.  Unclear of her dry weight but last check in clinic was 286 pounds approximately 1 year ago.  She states that she has a thoracentesis scheduled for 4/23.      Review of Systems  Constitutional, HEENT, cardiovascular, pulmonary, gi and gu systems are negative, except as otherwise noted.      Objective           Vitals:  No vitals were obtained today due to virtual visit.    Physical Exam   GENERAL: alert and no distress  EYES: Eyes grossly normal to inspection.  No discharge or erythema, or obvious scleral/conjunctival abnormalities.  RESP: No audible wheeze, cough, or visible cyanosis.    SKIN: Visible skin clear. No significant  rash, abnormal pigmentation or lesions.  NEURO: Cranial nerves grossly intact.  Mentation and speech appropriate for age.  PSYCH: Appropriate affect, tone, and pace of words    CXR - Reviewed and interpreted by me Normal- no infiltrates, effusions, pneumothoraces, cardiomegaly or masses  Pulmonary edema  Lab on 04/08/2025   Component Date Value Ref Range Status    WBC Count 04/08/2025 7.1  4.0 - 11.0 10e3/uL Final    RBC Count 04/08/2025 4.47  3.80 - 5.20 10e6/uL Final    Hemoglobin 04/08/2025 13.1  11.7 - 15.7 g/dL Final    Hematocrit 04/08/2025 41.4  35.0 - 47.0 % Final    MCV 04/08/2025 93  78 - 100 fL Final    MCH 04/08/2025 29.3  26.5 - 33.0 pg Final    MCHC 04/08/2025 31.6  31.5 - 36.5 g/dL Final    RDW 04/08/2025 13.1  10.0 - 15.0 % Final    Platelet Count 04/08/2025 249  150 - 450 10e3/uL Final    Sodium 04/08/2025 142  135 - 145 mmol/L Final    Potassium 04/08/2025 4.8  3.4 - 5.3 mmol/L Final    Chloride 04/08/2025 108 (H)  98 - 107 mmol/L Final    Carbon Dioxide (CO2) 04/08/2025 24  22 - 29 mmol/L Final    Anion Gap 04/08/2025 10  7 - 15 mmol/L Final    Urea Nitrogen 04/08/2025 28.0 (H)  8.0 - 23.0 mg/dL Final    Creatinine 04/08/2025 1.49 (H)  0.51 - 0.95 mg/dL Final    GFR Estimate 04/08/2025 39 (L)  >60 mL/min/1.73m2 Final    eGFR calculated using 2021 CKD-EPI equation.    Calcium 04/08/2025 9.6  8.8 - 10.4 mg/dL Final    Glucose 04/08/2025 97  70 - 99 mg/dL Final    CRP Inflammation 04/08/2025 35.70 (H)  <5.00 mg/L Final    Erythrocyte Sedimentation Rate 04/08/2025 55 (H)  0 - 30 mm/hr Final    Rheumatoid Factor 04/08/2025 51 (H)  <14 IU/mL Final    N Terminal Pro BNP Outpatient 04/08/2025 1,182 (H)  0 - 900 pg/mL Final    Reference range shown and results flagged as abnormal are for the outpatient, non acute settings. Establishing a baseline value for each individual patient is useful for follow-up.    Suggested inpatient cut points for confirming diagnosis of CHF in an acute setting are:  >450 pg/mL  (age 18 to less than 50)  >900 pg/mL (age 50 to less than 75)  >1800 pg/mL (75 yrs and older)    An inpatient or emergency department NT-proPBNP <300 pg/mL effectively rules out acute CHF, with 99% negative predictive value.        Procalcitonin 04/08/2025 0.09  <0.50 ng/mL Final    Comment: Interpretation and Recommendations     <0.5 ng/mL:   Systemic bacterial infection unlikely. Local bacterial infection is possible.     0.5-1.99 ng/mL:   Systemic bacterial infection possible, but various other conditions are known to induce PCT as well.     >=2.00 ng/mL:   Systemic bacterial infection likely, unless other causes are known.     Decision to start antibiotics should not be based on procalcitonin level alone. See Procalcitonin Guidance document for more details. [email or web address]-[email or web address]-guidance-[email or web address]-[email or web address]    Factors that may affect PCT levels (not all-inclusive):     - Increased PCT level           Severe trauma/burns           Invasive surgery           Cooling therapy after cardiac arrest/surgery           Treatment with agents which stimulate cytokines           Acute kidney injury           Chronic kidney disease and end stage renal disease           Acute graft vs host disease           Non-specific shock                            causing decreased organ perfusion and/or infarction       - Normal or unchanged PCT level           Early in infections (if low and infection is suspected, repeating in 6-12 hours is recommended)           Chronic infections (endocarditis, osteomyelitis, prosthetic device/graft infections)           Localized infections (cellulitis, wound infections, intra-abdominal abscess)     Note: PCT has not been extensively studied in pregnancy/breastfeeding, pediatrics, severe immunosuppression, and cystic fibrosis.         Video-Visit Details    Type of service:  Video Visit   Video End Time:2:13 PM  Originating Location (pt. Location):  Home    Distant Location (provider location):  On-site  Platform used for Video Visit: Tu  Signed Electronically by: Amaury Cortes MD

## 2025-04-15 NOTE — TELEPHONE ENCOUNTER
Incoming call from pt, she states experiencing SOB currently, ongoing for x 1-2 weeks. Pt states she saw doctor last week and was put on lasix and mucinex which did not help. Pt states she had a virtual visit today and provider suggested hospital visit for further evaluation and possible lasix IV or pleural effusion to help with SOB. Writer recommended pt go into ED now as this was the providers recommendations from today's visit. Pt agreeable and will have family member drive her now. Thank you.

## 2025-04-15 NOTE — PATIENT INSTRUCTIONS
Shira Rodriguez, it was a pleasure seeing you in clinic today (April 15, 2025). To briefly summarize our plan moving forward:    1) Take 40 mg of PO lasix (2 tablets) today then present to the [facility] for an evaluation of your heart failure.    2) I will have you scheduled for another video visit in 1 week to reassess your breathing at that time if you are released from the hospital by then.    If you have any further questions or concerns, please call the clinic ([phone]) or send a message via the [employer] feature.   Thank you for entrusting me with your care, and I look forward to seeing you at your next clinic visit.    Amaury Cortes MD  Internal Medicine  PGY-2  [facility]        Thank you for visiting the Primary Care Center today at the [facility]! The following is some information about our clinic:     Primary Care Center Frequently-Asked Questions    (1) How do I schedule appointments at the [facility]?     Primary Care--to schedule or make changes to an existing appointment, please call our primary care line at [phone].    Labs--to schedule a lab appointment at the [facility] you can use [employer] or call [phone]. If you have a [facility] [place] location that is closer to home, you can reach out to that location for scheduling options.     Imaging--if you need to schedule a CT, X-ray, MRI, ultrasound, or other imaging study you can call [phone] to schedule at the [facility] or any other [facility] imaging location.     Referrals--if a referral to another specialty was ordered you can expect a phone call from their scheduling team. If you have not heard from them in a week, please call us or send us a [employer] message to check the status or get a scheduling number. Please note that this only applies to internal [facility] referrals. If the referral is external you would need to contact their  office for scheduling.     (2) I have a question about my visit, who do I contact?     You can call us at the primary care line at [phone] to ask questions about your visit. You can also send a secure message through [employer], which is reviewed by clinic staff. Please note that [employer] messages have a twenty-four to forty-eight business hour turnaround time and should not be used for urgent concerns.    (3) How will I get the results of my tests?    If you are signed up for [employer] all tests will be released to you within twenty-four hours of resulting. Please allow three to five days for your doctor to review your results and place a note interpreting the results. If you do not have [employer] you will receive your results through mail seven to ten business days following the return of the tests. Please note that if there should be any urgent or concerning results that your doctor or their registered nurse will reach out to you the same day as the tests come back. If you have follow up questions about your results or would like to discuss the results in detail please schedule a follow up with your provider either in person or virtually.     (4) How do I get refills of my prescriptions?     You should always first contact your pharmacy for refills of your medications. If submitting a refill request on [employer], please be sure to submit the request only once--repeat requests can cause delays in refill. If you are requesting a NEW medication or a medication related to new symptoms you will need to schedule an appointment with a provider prior to approval. Please note: Routine medication refills have up to one to three business day turnaround whereas controlled substances refills have up to five to seven business day turnaround.    (5) I have new symptoms, what do I do?     If you are having an immediate medical emergency, you should dial 911 for assistance.   For anything urgent that needs to be seen within a few  hours to one day you should visit a local urgent care for assistance.  For non-urgent symptoms that need to be seen within a few days to a week you can schedule with an available provider in primary care by going to [employer] or calling [phone].   If you are not sure how serious your symptoms are or you would like to receive medical advice you can always call [phone] to speak with a triage nurse.

## 2025-04-15 NOTE — ED PROVIDER NOTES
Emergency Department Note      History of Present Illness     Chief Complaint   Shortness of Breath      HPI   Shira Rodriguez is a 64 year old female with a past medical history significant for chronic bronchitis, COPD, CHF and cardiac arrest on Coumadin here for evaluation of shortness of breath. Patient was diagnosed with heart failure one week ago. She has been taking Lasix 20 mg for the past week without relief. She states walking 10 feet makes her short of breath, has felt like this over the past week, has not worsened. No shortness of breath at rest. However her cough has been worsening. She started an inhaler last week, has not helped. She does not use oxygen at home. No chest pain or tightness. No fever. Reports mild leg swelling. She has gained about 30 pounds in the past year. No sore throat. She spoke with her doctor today, recommended she come in today for IV Lasix. Her doctor also ordered an echo. Patient has a walker and cane, however has been pretty much sedentary for a long time due to arthritis and deconditioning. She notes she is eating healthier.    Independent Historian   None    Review of External Notes   I reviewed the virtual visit from today 4/15/25 with Dr. Cortes- Patient was diagnosis with heart failure one week ago - tried outpatient Lasix regimen in the last week however this has not helped. Patient also has a history of unprovoked PE and has had echmo. Patient is 318 pounds today, one year ago was 286.     Past Medical History     Medical History and Problem List   Acute massive pulmonary embolism   Cardiac arrest  Chronic bronchitis  Chronic pain  Contact dermatitis  COPD  Depressive disorder  Hyperlipidemia  Hypertension  Morbid obesity   Osteoarthrosis  Paroxysmal atrial fibrillation    RA    Sleep apnea  Tobacco use disorder  Varicella      Medications   Albuterol   Lasix   Cymbalta  Lisinopril   Aerobika   Coumadin 5 mg   Abilify     Surgical History   Total knee arthroplasty,  bilateral         Physical Exam     Patient Vitals for the past 24 hrs:   BP Temp Temp src Pulse Resp SpO2   04/15/25 1900 129/76 -- -- 76 10 96 %   04/15/25 1800 123/70 -- -- 79 12 98 %   04/15/25 1700 -- -- -- 76 18 93 %   04/15/25 1630 112/79 97.2  F (36.2  C) Temporal 94 20 94 %     Physical Exam  General: Alert, appears well-developed and well-nourished. Cooperative.     In mild  distress  HEENT:  Head:  Atraumatic  Ears:  External ears are normal  Mouth/Throat:  Oropharynx is without erythema or exudate and mucous membranes are moist   Eyes:   Conjunctivae normal and EOM are normal. No scleral icterus.    Pupils are equal, round, and reactive to light.   Neck:   Normal range of motion. Neck supple.  CV:  Normal rate, regular rhythm, normal heart sounds and radial pulses are 2+ and symmetric.  Systolic murmur.  Resp:  Breath sounds are clear bilaterally, no wheezing.      Non-labored, no retractions or accessory muscle use  GI:  Morbidly obese.  Abdomen is soft, no distension, no tenderness. No rebound or guarding.  No CVA tenderness bilaterally  MS:  Normal range of motion. Trace BLE edema.    Back atraumatic.    No midline cervical, thoracic, or lumbar tenderness  Skin:  Warm and dry.  No rash or lesions noted.  Neuro:   Alert. Normal strength.  GCS: 15  Psych: Normal mood and affect.     Diagnostics     Lab Results   Labs Ordered and Resulted from Time of ED Arrival to Time of ED Departure   INR - Abnormal       Result Value    INR 3.04 (*)    COMPREHENSIVE METABOLIC PANEL - Abnormal    Sodium 137      Potassium 4.4      Carbon Dioxide (CO2) 25      Anion Gap 10      Urea Nitrogen 25.2 (*)     Creatinine 1.46 (*)     GFR Estimate 40 (*)     Calcium 9.4      Chloride 102      Glucose 103 (*)     Alkaline Phosphatase 65      AST 25      ALT 12      Protein Total 7.6      Albumin 3.3 (*)     Bilirubin Total 0.2     TROPONIN T, HIGH SENSITIVITY - Abnormal    Troponin T, High Sensitivity 15 (*)    NT PROBNP  INPATIENT - Abnormal    N terminal Pro BNP Inpatient 2,254 (*)    CBC WITH PLATELETS AND DIFFERENTIAL - Abnormal    WBC Count 7.0      RBC Count 4.12      Hemoglobin 11.9      Hematocrit 37.4      MCV 91      MCH 28.9      MCHC 31.8      RDW 13.2      Platelet Count 165      % Neutrophils 80      % Lymphocytes 10      % Monocytes 7      % Eosinophils 2      % Basophils 0      % Immature Granulocytes 0      NRBCs per 100 WBC 0      Absolute Neutrophils 5.6      Absolute Lymphocytes 0.7 (*)     Absolute Monocytes 0.5      Absolute Eosinophils 0.1      Absolute Basophils 0.0      Absolute Immature Granulocytes 0.0      Absolute NRBCs 0.0     TROPONIN T, HIGH SENSITIVITY - Abnormal    Troponin T, High Sensitivity 17 (*)    MAGNESIUM - Normal    Magnesium 2.1     TSH WITH FREE T4 REFLEX - Normal    TSH 2.00         Imaging   XR Chest 2 Views   Final Result   IMPRESSION: Unchanged moderate to large right pleural effusion. Mild pulmonary interstitial edema. Unchanged small left pleural effusion. No definitive airspace consolidation. No pneumothorax.      Unchanged mild cardiomegaly. Atherosclerotic calcifications of the thoracoabdominal aorta.          EKG   ECG results from 04/15/25   EKG 12-lead, tracing only     Value    Systolic Blood Pressure     Diastolic Blood Pressure     Ventricular Rate 89    Atrial Rate 89    SC Interval 158    QRS Duration 94        QTc 457    P Axis 18    R AXIS -11    T Axis 18    Interpretation ECG      Sinus rhythm  Anterior infarct , age undetermined  Abnormal ECG  When compared with ECG of 14-Feb-2023 00:44, No significant changes   Read by me at 1646        *Note: Due to a large number of results and/or encounters for the requested time period, some results have not been displayed. A complete set of results can be found in Results Review.       Independent Interpretation   CXR: Right pleural effusion.  No pneumothorax..    ED Course      Medications Administered   Medications    furosemide (LASIX) injection 60 mg (60 mg Intravenous $Given 4/15/25 1903)       Procedures   Procedures     Discussion of Management   Admitting Hospitalist, Dr. Martinez    ED Course   ED Course as of 04/15/25 2045   Tue Apr 15, 2025   1647 I obtained history and examined the patient as noted above.    1900 I spoke with Dr. Martinez, hospitalist, who accepted the patient for admission.        Additional Documentation  None    Medical Decision Making / Diagnosis     CMS Diagnoses: None    MIPS       None    MDM   Shira Rodriguez is a 64 year old female with a PMH of chronic bronchitis, COPD, CHF, PE on chronic anticoagulation with warfarin and prior cardiac arrest s/p VV ECMO who presents for evaluation of shortness of breath.  I considered a broad differential of their dyspnea including CHF exacerbation, PE, dissection, hemothorax, pleural effusion, pneumonia, acute coronary syndrome, reactive airway disease, bronchitis, upper airway obstruction, foreign body, etc.  Given the history and exam plus laboratory findings, I feel congestive heart failure is the most likely etiology and would not do extensive workup for other causes as mentioned above at this time.  The patient received IV diuretics in ED and felt improved; will start I/O's here in ED.  Will admit at this time for further cares.  Not on supplemental oxygen at this time.  I would rule them out for ACS in the hospital given their presentation but this seems classic CHF.  Spoke with hospitalist service who agreed to admission.     Disposition   The patient was admitted to the hospital.     Diagnosis     ICD-10-CM    1. Pleural effusion on right  J90       2. Acute on chronic congestive heart failure, unspecified heart failure type (H)  I50.9            Discharge Medications   New Prescriptions    No medications on file         Scribe Disclosure:  I, Mari Mancia, am serving as a scribe at 4:43 PM on 4/15/2025 to document services personally  performed by Ish Szymanski MD based on my observations and the provider's statements to me.        Ish Szymanski MD  04/15/25 2046

## 2025-04-15 NOTE — ED TRIAGE NOTES
Pt with Hx of chronic bronchitis presents with  congested cough and shortness of breath. Pt saw PCP last week for same and was prescribed PO lasix without change in condition. Pt was diagnosed with acute CHF and plural effusions.

## 2025-04-16 ENCOUNTER — APPOINTMENT (OUTPATIENT)
Dept: CARDIOLOGY | Facility: CLINIC | Age: 65
DRG: 187 | End: 2025-04-16
Attending: HOSPITALIST
Payer: COMMERCIAL

## 2025-04-16 LAB
ANION GAP SERPL CALCULATED.3IONS-SCNC: 10 MMOL/L (ref 7–15)
APTT PPP: 46 SECONDS (ref 22–38)
BUN SERPL-MCNC: 26.9 MG/DL (ref 8–23)
CALCIUM SERPL-MCNC: 9.1 MG/DL (ref 8.8–10.4)
CHLORIDE SERPL-SCNC: 103 MMOL/L (ref 98–107)
CREAT SERPL-MCNC: 1.59 MG/DL (ref 0.51–0.95)
EGFRCR SERPLBLD CKD-EPI 2021: 36 ML/MIN/1.73M2
GLUCOSE SERPL-MCNC: 104 MG/DL (ref 70–99)
HCO3 SERPL-SCNC: 25 MMOL/L (ref 22–29)
INR PPP: 3.08 (ref 0.85–1.15)
LDH SERPL L TO P-CCNC: 181 U/L (ref 0–250)
LVEF ECHO: NORMAL
POTASSIUM SERPL-SCNC: 4.4 MMOL/L (ref 3.4–5.3)
PROT SERPL-MCNC: 7.6 G/DL (ref 6.4–8.3)
SODIUM SERPL-SCNC: 138 MMOL/L (ref 135–145)

## 2025-04-16 PROCEDURE — 94640 AIRWAY INHALATION TREATMENT: CPT

## 2025-04-16 PROCEDURE — 250N000011 HC RX IP 250 OP 636: Performed by: HOSPITALIST

## 2025-04-16 PROCEDURE — 82310 ASSAY OF CALCIUM: CPT | Performed by: HOSPITALIST

## 2025-04-16 PROCEDURE — 99232 SBSQ HOSP IP/OBS MODERATE 35: CPT | Performed by: HOSPITALIST

## 2025-04-16 PROCEDURE — 999N000157 HC STATISTIC RCP TIME EA 10 MIN

## 2025-04-16 PROCEDURE — 83615 LACTATE (LD) (LDH) ENZYME: CPT | Performed by: HOSPITALIST

## 2025-04-16 PROCEDURE — 250N000009 HC RX 250: Performed by: HOSPITALIST

## 2025-04-16 PROCEDURE — 80048 BASIC METABOLIC PNL TOTAL CA: CPT | Performed by: HOSPITALIST

## 2025-04-16 PROCEDURE — 250N000013 HC RX MED GY IP 250 OP 250 PS 637: Performed by: HOSPITALIST

## 2025-04-16 PROCEDURE — 93306 TTE W/DOPPLER COMPLETE: CPT | Mod: 26 | Performed by: INTERNAL MEDICINE

## 2025-04-16 PROCEDURE — 84155 ASSAY OF PROTEIN SERUM: CPT | Performed by: HOSPITALIST

## 2025-04-16 PROCEDURE — 255N000002 HC RX 255 OP 636: Performed by: HOSPITALIST

## 2025-04-16 PROCEDURE — 85730 THROMBOPLASTIN TIME PARTIAL: CPT | Performed by: HOSPITALIST

## 2025-04-16 PROCEDURE — 120N000001 HC R&B MED SURG/OB

## 2025-04-16 PROCEDURE — 85610 PROTHROMBIN TIME: CPT | Performed by: HOSPITALIST

## 2025-04-16 PROCEDURE — 999N000208 ECHOCARDIOGRAM COMPLETE

## 2025-04-16 PROCEDURE — 36415 COLL VENOUS BLD VENIPUNCTURE: CPT | Performed by: HOSPITALIST

## 2025-04-16 RX ORDER — GUAIFENESIN 600 MG/1
600 TABLET, EXTENDED RELEASE ORAL 2 TIMES DAILY PRN
Status: DISCONTINUED | OUTPATIENT
Start: 2025-04-16 | End: 2025-04-19 | Stop reason: HOSPADM

## 2025-04-16 RX ORDER — HYDROXYCHLOROQUINE SULFATE 200 MG/1
200 TABLET, FILM COATED ORAL 2 TIMES DAILY
Status: DISCONTINUED | OUTPATIENT
Start: 2025-04-16 | End: 2025-04-19 | Stop reason: HOSPADM

## 2025-04-16 RX ORDER — ARIPIPRAZOLE 10 MG/1
20 TABLET ORAL DAILY
Status: DISCONTINUED | OUTPATIENT
Start: 2025-04-16 | End: 2025-04-19 | Stop reason: HOSPADM

## 2025-04-16 RX ORDER — ALBUTEROL SULFATE 90 UG/1
2 INHALANT RESPIRATORY (INHALATION) EVERY 6 HOURS PRN
Status: DISCONTINUED | OUTPATIENT
Start: 2025-04-16 | End: 2025-04-19 | Stop reason: HOSPADM

## 2025-04-16 RX ORDER — LISINOPRIL 5 MG/1
5 TABLET ORAL DAILY
Status: DISCONTINUED | OUTPATIENT
Start: 2025-04-17 | End: 2025-04-19 | Stop reason: HOSPADM

## 2025-04-16 RX ORDER — VENLAFAXINE HYDROCHLORIDE 150 MG/1
150 CAPSULE, EXTENDED RELEASE ORAL DAILY
Status: DISCONTINUED | OUTPATIENT
Start: 2025-04-16 | End: 2025-04-19 | Stop reason: HOSPADM

## 2025-04-16 RX ORDER — FUROSEMIDE 20 MG/1
20 TABLET ORAL DAILY
Status: DISCONTINUED | OUTPATIENT
Start: 2025-04-16 | End: 2025-04-17

## 2025-04-16 RX ORDER — FUROSEMIDE 10 MG/ML
40 INJECTION INTRAMUSCULAR; INTRAVENOUS DAILY
Status: DISCONTINUED | OUTPATIENT
Start: 2025-04-17 | End: 2025-04-17

## 2025-04-16 RX ORDER — ACETAMINOPHEN 650 MG/1
650 SUPPOSITORY RECTAL EVERY 4 HOURS PRN
Status: DISCONTINUED | OUTPATIENT
Start: 2025-04-16 | End: 2025-04-19 | Stop reason: HOSPADM

## 2025-04-16 RX ORDER — ACETAMINOPHEN 325 MG/1
650 TABLET ORAL EVERY 4 HOURS PRN
Status: DISCONTINUED | OUTPATIENT
Start: 2025-04-16 | End: 2025-04-19 | Stop reason: HOSPADM

## 2025-04-16 RX ORDER — IPRATROPIUM BROMIDE AND ALBUTEROL SULFATE 2.5; .5 MG/3ML; MG/3ML
3 SOLUTION RESPIRATORY (INHALATION) EVERY 4 HOURS PRN
Status: DISCONTINUED | OUTPATIENT
Start: 2025-04-16 | End: 2025-04-19 | Stop reason: HOSPADM

## 2025-04-16 RX ADMIN — HYDROXYCHLOROQUINE SULFATE 200 MG: 200 TABLET ORAL at 21:02

## 2025-04-16 RX ADMIN — IPRATROPIUM BROMIDE AND ALBUTEROL SULFATE 3 ML: .5; 3 SOLUTION RESPIRATORY (INHALATION) at 15:53

## 2025-04-16 RX ADMIN — ARIPIPRAZOLE 20 MG: 10 TABLET ORAL at 15:36

## 2025-04-16 RX ADMIN — VENLAFAXINE HYDROCHLORIDE 150 MG: 150 CAPSULE, EXTENDED RELEASE ORAL at 15:36

## 2025-04-16 RX ADMIN — FUROSEMIDE 40 MG: 10 INJECTION, SOLUTION INTRAMUSCULAR; INTRAVENOUS at 09:15

## 2025-04-16 RX ADMIN — PERFLUTREN 10 ML: 6.52 INJECTION, SUSPENSION INTRAVENOUS at 15:17

## 2025-04-16 RX ADMIN — ACETAMINOPHEN 650 MG: 325 TABLET, FILM COATED ORAL at 05:49

## 2025-04-16 RX ADMIN — MICONAZOLE NITRATE: 2 POWDER TOPICAL at 09:15

## 2025-04-16 RX ADMIN — MICONAZOLE NITRATE: 2 POWDER TOPICAL at 03:48

## 2025-04-16 RX ADMIN — MICONAZOLE NITRATE: 2 POWDER TOPICAL at 21:02

## 2025-04-16 ASSESSMENT — ACTIVITIES OF DAILY LIVING (ADL)
ADLS_ACUITY_SCORE: 45
ADLS_ACUITY_SCORE: 34
ADLS_ACUITY_SCORE: 45
ADLS_ACUITY_SCORE: 49
ADLS_ACUITY_SCORE: 34
ADLS_ACUITY_SCORE: 49
ADLS_ACUITY_SCORE: 45
ADLS_ACUITY_SCORE: 45
ADLS_ACUITY_SCORE: 34
ADLS_ACUITY_SCORE: 49
ADLS_ACUITY_SCORE: 45
ADLS_ACUITY_SCORE: 34
ADLS_ACUITY_SCORE: 45
ADLS_ACUITY_SCORE: 45
ADLS_ACUITY_SCORE: 49

## 2025-04-16 NOTE — PROGRESS NOTES
[facility]    Patient is currently receiving services with [facility]. Patient was admitted to home care services last week, with plan for skilled nursing and PT services. Patient's  and home health team have been notified that patient is under inpatient status. [facility] Liaison will continue to follow patient. Please provide orders to resume home care at time of discharge if appropriate.

## 2025-04-16 NOTE — H&P
[facility]  History and Physical   Hospitalist  Naga Martinez MD       Shira Rodriguez MRN# [id number]   YOB: 1960 Age: 64 year old      Date of Admission:  4/15/2025         Assessment and Plan:   Shira Rodriguez is a 64 year old female with PMH significant for morbid obesity, rheumatoid arthritis, h/o massive PE leading to cardiac arrest (2019), hypertension, b/l pleural effusion, reactive airway disease/chronic bronchitis, CYRUS who presented to ED for evaluation of dyspnea on exertion ongoing for about two weeks, admitted inpatient 4/15/25.    She was seen in clinic on 4/8/25 for dyspnea on exertion. Chest imaging at the time was noted with bilateral pleural effusion along with lung edema and elevated BNP. She used to weigh 286 pounds and in clinic she was noted to weigh 318 pounds . With concern for decompensated CHF , she was started on 20 mg PO Lasix. She had follow-up virtual visit on the day of presentation, noted with no significant clinical improvement and was sent to ED. she had been scheduled for outpatient thoracentesis on 4/23/25.    Dyspnea on exertion  likely acute CHF  bilateral pleural effusion  H/o cardiac arrest due to massive pulmonary embolism (2019)  Hypertension  chronic bronchitis  morbid obesity, sleep apnea  -initial presentation and recent evaluation in clinic as noted above  -was recently started on Lasix 20 mg daily PTA with no significant clinical improvement  -BNP elevated to 2254, 90 elevated 15, normal TSH, INR therapeutic 3.04  -her dyspnea on exertion could be multifactorial due to CHF, pleural effusion, chronic bronchitis  -reports significant weight gain but clinically does not look significantly fluid overloaded    -Received 60 mg IV Lasix in ED; will continue Lasix 40 mg IV BID; strict intake and output  -was planned for outpatient thoracentesis but given no significant clinical improvement with diuresis, will obtain ultrasound-guided  thoracentesis  -INR therapeutic, less likely acute PE; respiratory status stable on room air  -will hold warfarin until after thoracentesis  -will obtain an ECHO; last ECHO from 2023 with EF 60 to 65%  -will resume PTA lisinopril when verified by pharmacy  -will continue with PTA inhalers    Anxiety/depression  -resume PTA Abilify, Cymbalta when verified by pharmacy    CKD stage III  -creatinine 1.46, stable around baseline of 1.4 to 1.6  -monitor BMP with diuresis    Likely candida intertrigo  -she has significant rash under abdominal fold  -she was admitted in 2023 with severe sepsis and neutropenia likely due to panniculitis  -will have miconazole powder PRN    Rheumatoid arthritis  -resume PTA Plaquenil when verified      Clinically Significant Risk Factors Present on Admission               # Hypoalbuminemia: Lowest albumin = 3.3 g/dL at 4/15/2025  5:49 PM, will monitor as appropriate  # Drug Induced Coagulation Defect: home medication list includes an anticoagulant medication      # Hypertension: Home medication list includes antihypertensive(s)  # Acute heart failure with preserved ejection fraction: heart failure noted on problem list, last echo with EF >50%, and receiving IV diuretics                            DVT prophylaxis: on warfarin  Code status: full code    Disposition:   Medically Ready for Discharge: Anticipated in 2-4 Days       Care plan discussed with ED provider and patient.           Primary Care Physician:   Gaby Hernandez [phone]         Chief Complaint:     Dyspnea on exertion    History is obtained from the patient         History of Present Illness:     Shira Rodriguez is a 64 year old female with PMH significant for morbid obesity, rheumatoid arthritis, h/o massive PE leading to cardiac arrest (2019), hypertension, b/l pleural effusion, reactive airway disease/chronic bronchitis, CYRUS who presented to ED for evaluation of dyspnea on exertion ongoing for about two weeks, admitted  inpatient 4/15/25.    She was seen in clinic on 4/8/25 for dyspnea on exertion. Chest imaging at the time was noted with bilateral pleural effusion along with lung edema and elevated BNP. She used to weigh 286 pounds and in clinic she was noted to weigh 318 pounds . With concern for decompensated CHF , she was started on 20 mg PO Lasix. She had follow-up virtual visit on the day of presentation, noted with no significant clinical improvement and was sent to ED. she had been scheduled for outpatient thoracentesis on 4/23/25.    -initial presentation and recent evaluation in clinic as noted above  -was recently started on Lasix 20 mg daily PTA with no significant clinical improvement  -BNP elevated to 2254, 90 elevated 15, normal TSH, INR therapeutic 3.04  -Received 60 mg IV Lasix in ED and hospitalist was requested admission for further evaluation.    The patient denies any fever, chills, rigors or chest pain.  Denies pain abdomen.  No bowel or bladder disturbances.           Past Medical History:     morbid obesity  rheumatoid arthritis  h/o massive PE leading to cardiac arrest (2019)  Hypertension  b/l pleural effusion  reactive airway disease/chronic bronchitis  CYRUS           Past Surgical History:     Past Surgical History:   Procedure Laterality Date    ARTHROPLASTY KNEE  6/14/2012    Procedure: ARTHROPLASTY KNEE;  Left Total Knee Arthroplasty;  Surgeon: Annette Quesada MD;  Location: [facility] OR    ARTHROSCOPY HIP Right     BONE MARROW BIOPSY, BONE SPECIMEN, NEEDLE/TROCAR N/A 2/3/2023    Procedure: bone marrow biopsy;  Surgeon: So Horowitz MD;  Location:  GI    BRONCHOSCOPY FLEXIBLE AND RIGID N/A 9/17/2019    Procedure: Bronchoscopy flexible;  Surgeon: Patrick Rayo MD;  Location: [facility] OR    COLONOSCOPY N/A 7/16/2021    Procedure: COLONOSCOPY, WITH POLYPECTOMY AND BIOPSY;  Surgeon: Diogo Lynch MD;  Location: [facility] GI    CV EXTRACORPERAL MEMBRANE OXYGENATION N/A 9/9/2019     Procedure: Extracorporeal Membrance Oxygenation;  Surgeon: Kaleb Mcfarlane MD;  Location:  HE[facility] CARD[facility] CATH LAB    INSERT EXTRACORPORAL MEMBRANE OXYGENATOR N/A 2019    Procedure: Venous-Venous cannulation using ultrasound guidance and transesophageal echocardiogram, venous-arterial ecmo decannulation and repair of left femoral artery;  Surgeon: Patrick Rayo MD;  Location: [facility] OR    IR MECH THROMB PRIM ART, NON-INTRACRNL  9/10/2019    IR PULMONARY ANGIOGRAM BILATERAL  9/10/2019    IR THROMBOLYSIS ARTERIAL INFUSION INITIAL DAY  9/10/2019    THORACENTESIS N/A 2020    Procedure: THORACENTESIS;  Surgeon: Georgina Richardson MD;  Location: [facility] TOTAL HIP ARTHROPLASTY  04    RT              Home Medications:     Prior to Admission Medications   Prescriptions Last Dose Informant Patient Reported? Taking?   ARIPiprazole (ABILIFY) 5 MG tablet   Yes No   Sig: Take 1 tablet by mouth daily at 2 pm   DULoxetine (CYMBALTA) 60 MG capsule  Self No No   Sig: Take 1 capsule (60 mg) by mouth daily   Respiratory Therapy Supplies (AEROBIKA) YASMANI   No No   Si each 3 times daily.   VITAMIN D3 25 MCG (1000 UT) tablet  Self Yes No   Sig: TAKE 2 TABLETS BY MOUTH EVERY DAY   Warfarin Therapy Reminder   No No   Sig: Take 1 each by mouth See Admin Instructions   acetaminophen (TYLENOL) 325 MG tablet  Self No No   Sig: Take 2 tablets (650 mg) by mouth every 6 hours as needed for mild pain or fever   albuterol (PROAIR HFA/PROVENTIL HFA/VENTOLIN HFA) 108 (90 Base) MCG/ACT inhaler   No No   Sig: Inhale 2 puffs into the lungs every 6 hours as needed for shortness of breath, wheezing or cough.   furosemide (LASIX) 20 MG tablet   No No   Sig: Take 2 tablets (40 mg) by mouth daily.   guaiFENesin (MUCINEX) 600 MG 12 hr tablet   No No   Sig: Take 1 tablet (600 mg) by mouth 2 times daily as needed for congestion or cough.   hydroxychloroquine (PLAQUENIL) 200 MG tablet   No No   Sig: TAKE 1 TABLET(200 MG) BY MOUTH  TWICE DAILY   lisinopril (ZESTRIL) 5 MG tablet   No No   Sig: Take 1 tablet (5 mg) by mouth daily   multivitamin w/minerals (THERA-VIT-M) tablet   No No   Sig: Take 1 tablet by mouth daily   nystatin (MYCOSTATIN) 855385 UNIT/GM external powder   No No   Sig: Apply topically 4 times daily as needed   senna-docusate (SENOKOT-S/PERICOLACE) 8.6-50 MG tablet   No No   Sig: Take 1 tablet by mouth 2 times daily as needed for constipation   warfarin ANTICOAGULANT (COUMADIN) 5 MG tablet   No No   Sig: Take 2 tablets (10mg) daily or as directed by Anticoagulation Clinic-adjusted per INR results      Facility-Administered Medications: None            Allergies:     Allergies   Allergen Reactions    Adhesive Tape Blisters    Erythromycin Rash            Social History:   Shira Rodriguez  reports that she quit smoking about 5 years ago. Her smoking use included cigarettes. She started smoking about 43 years ago. She has never used smokeless tobacco. She reports that she does not currently use alcohol. She reports that she does not use drugs.              Family History:   Shira Rodriguez family history includes Alcoholism in her father and sister; Breast Cancer in her paternal grandmother; Cardiovascular in her paternal aunt and paternal uncle; Cerebrovascular Disease in her mother; Depression in her cousin, maternal grandmother, mother, sister, sister, and another family member; Diabetes in her mother; Heart Disease in her father and sister; Hypertension in her maternal grandmother and mother; Pancreatic Cancer in her paternal grandmother; Prostate Cancer in her paternal grandfather; Skin Cancer in her mother; Substance Abuse in her father, sister, and sister; Thyroid Disease in her mother and sister.    Family history was reviewed by myself and not pertinent to current presentation.           Review of Systems:   A10 point Review of Systems was done and were negative other than noted in the HPI.             Physical Exam:    Blood pressure 112/79, pulse 76, temperature 97.2  F (36.2  C), temperature source Temporal, resp. rate 18, SpO2 93%, not currently breastfeeding.  0 lbs 0 oz        Constitutional: Alert, awake and oriented X 3; lying comfortably in bed in no apparent distress; morbidly obese   HEENT: Pupils equal and reactive to light and accomodation, EOMI intact; neck supple no raised JVD or rigidity    Oral cavity: Moist mucosa   Cardiovascular: Normal s1 s2, regular rate and rhythm, no murmur   Lungs: decreased breath sounds bilaterally , right>left, no wheezes or crepitations   Abdomen: Soft, nt, no guarding, rigidity or rebound; BS +; morbidly obese   LE : Mild b/l edema +   Musculoskeletal: Power 5/5 in all extremities   Neuro: No focal neurological deficits noted, CN II to XII grossly intact   Psychiatry: normal mood and affect  Skin: noted fungal rash under pannicula             Data:   All new lab and imaging data was reviewed in Epic.   Significant labs and imagings include:    CMP notable for BUN 25, creatinine 1.46, BNP 2254  normal TSH  troponin 15  CBC unremarkable  INR 3.04  EKG normal sinus rhythm  chest x-ray reviewed by myself:  IMPRESSION: Unchanged moderate to large right pleural effusion. Mild pulmonary interstitial edema. Unchanged small left pleural effusion. No definitive airspace consolidation. No pneumothorax.     Unchanged mild cardiomegaly. Atherosclerotic calcifications of the thoracoabdominal aorta.             Naga Martinez MD  Hospitalist

## 2025-04-16 NOTE — PLAN OF CARE
Summary:  Dyspnea on exertion,Acute CHF, [place] pleural effusion  DATE & TIME: 4/16/25 7414-7435    Cognitive Concerns/ Orientation : Alert and Oriented x4, pleasant   BEHAVIOR & AGGRESSION TOOL COLOR: Green  ABNL VS/O2: Vss on RA  MOBILITY: Assist x2 with gait belt walker  PAIN MANAGMENT: Denies  DIET: Regular, FR?  BOWEL/BLADDER: Purewick in place  ABNL LAB/BG: INR 3.08 ,BUN 26.9, Creat 1.59  DRAIN/DEVICES: L PIV SL  TELEMETRY RHYTHM: NSR  SKIN: Abd. folds and under L breast rash/redness, scattered scabs/bruising, calf folds - on scheduled miconazole  TESTS/PROCEDURES: Echo- completed , Thoracentesis possibly today  D/C DAY/GOALS/PLACE: Pending  OTHER IMPORTANT INFO: On lasix daily

## 2025-04-16 NOTE — PROGRESS NOTES
RECEIVING UNIT ED HANDOFF REVIEW    ED Nurse Handoff Report was reviewed by: Keyanna Olvera RN on April 15, 2025 at 9:52 PM

## 2025-04-16 NOTE — PHARMACY-ADMISSION MEDICATION HISTORY
Pharmacist Admission Medication History    Admission medication history is complete. The information provided in this note is only as accurate as the sources available at the time of the update.    Information Source(s): Patient and CareEverywhere/SureScripts via in-person    Pertinent Information: None    Warfarin:   Patient is taking warfarin for the indication of hx PE with an INR goal of 2-3.   Current dosing regimen is 10 mg daily.   Last dose (10 mg) was taken 4/14/25 in the evening.    Changes made to PTA medication list:  Added: venlafaxine  Deleted: duloxetine, warfarin therapy reminder entry  Changed:  Aripiprazole 5 mg -> 20 mg  Furosemide 40 mg -> 20 mg    Allergies reviewed with patient and updates made in EHR: no    Medication History Completed By: [facility] 4/15/2025 8:55 PM    PTA Med List   Medication Sig Note Last Dose/Taking    acetaminophen (TYLENOL) 325 MG tablet Take 2 tablets (650 mg) by mouth every 6 hours as needed for mild pain or fever  Taking As Needed    albuterol (PROAIR HFA/PROVENTIL HFA/VENTOLIN HFA) 108 (90 Base) MCG/ACT inhaler Inhale 2 puffs into the lungs every 6 hours as needed for shortness of breath, wheezing or cough.  Taking As Needed    ARIPiprazole (ABILIFY) 20 MG tablet Take 20 mg by mouth daily.  4/15/2025 Morning    furosemide (LASIX) 20 MG tablet Take 20 mg by mouth daily. 4/15/2025: Took additional 20 mg prior to admission for a total of 40 mg prior to admission. Taking    guaiFENesin (MUCINEX) 600 MG 12 hr tablet Take 1 tablet (600 mg) by mouth 2 times daily as needed for congestion or cough.  Taking As Needed    hydroxychloroquine (PLAQUENIL) 200 MG tablet TAKE 1 TABLET(200 MG) BY MOUTH TWICE DAILY  4/15/2025 Morning    lisinopril (ZESTRIL) 5 MG tablet Take 1 tablet (5 mg) by mouth daily  4/15/2025 Morning    multivitamin w/minerals (THERA-VIT-M) tablet Take 1 tablet by mouth daily  4/15/2025 Morning    nystatin (MYCOSTATIN) 010805 UNIT/GM external powder  Apply topically 4 times daily as needed  4/15/2025 Morning    senna-docusate (SENOKOT-S/PERICOLACE) 8.6-50 MG tablet Take 1 tablet by mouth 2 times daily as needed for constipation  Taking As Needed    venlafaxine (EFFEXOR XR) 150 MG 24 hr capsule Take 150 mg by mouth daily.  4/15/2025 Morning    VITAMIN D3 25 MCG (1000 UT) tablet Take 50 mcg by mouth daily.  4/15/2025 Morning    warfarin ANTICOAGULANT (COUMADIN) 5 MG tablet Take 2 tablets (10mg) daily or as directed by Anticoagulation Clinic-adjusted per INR results  4/14/2025 Evening

## 2025-04-16 NOTE — PROGRESS NOTES
Summary: Shortness of Breath    Orientation:  A/O x4, x1 Tylenol given    Vitals/Tele: VSS on RA; Tele: SR    IV Access/drains: R PIV SL     Diet Regular w/ 2,000 ml FR    Mobility: A x2, Not OOB yet    GI/: Incontinent of bowel, purewick in place, no BM this shift.    Wound/Skin: Abd. folds and under L breast rash/redness, scattered scabs/bruising, calf folds.     Consults:     Discharge Plan: Pending     Other Info: Plan Thoracentesis, Echo. CPAP order for bedtime starting 4/16/25. Pt on Lasix, abd. Hernia, pt obese w/ soft non-tender abdomen     See Flow sheets for assessment

## 2025-04-16 NOTE — PLAN OF CARE
Goal Outcome Evaluation:      Plan of Care Reviewed With: patient    Overall Patient Progress: no changeOverall Patient Progress: no change    Summary:  Dyspnea on exertion,Acute CHF, [place] pleural effusion  DATE & TIME: 4/16/25 [id number]   Cognitive Concerns/ Orientation : Alert and Oriented x4, pleasant   BEHAVIOR & AGGRESSION TOOL COLOR: Green  ABNL VS/O2: Vss on RA  MOBILITY: Assist x2 GBW. Up to chair this shift.   PAIN MANAGMENT: c/o L ankel R hip and knee pain with movement.  DIET: Regular  BOWEL/BLADDER: Purewick in place when in bed. Up to commode this shift.  ABNL LAB/BG: INR 3.08 ,BUN 26.9, Creat 1.59  DRAIN/DEVICES: L PIV SL  TELEMETRY RHYTHM: NSR  SKIN: Abd. folds and under L breast rash/redness, scattered scabs/bruising, calf folds - on scheduled miconazole  TESTS/PROCEDURES: Echo done today , Thoracentesis ordered- will be done when INR<2.  D/C DAY/GOALS/PLACE: Pending  OTHER IMPORTANT INFO: lasix d/bina

## 2025-04-16 NOTE — PROGRESS NOTES
[facility]  Hospitalist Progress Note        Naga Martinez MD   04/16/2025        Interval History:        - Reports of some cough and wheezing  - radiology deferred thoracentesis until INR<2, preferably about 1.8; given past history of significant pulmonary embolism leading to cardiac arrest, hesitant to reverse INR; will hold warfarin and let INR drift down with plans for thoracentesis likely in 1 to 2 days  - diuresing well with net negative -1100 ml since admission; Cr slightly up to 1.59, still around baseline  - will decrease Lasix to 40 mg IV daily; awaiting ECHO         Assessment and Plan:        Shira Rodriguez is a 64 year old female with PMH significant for morbid obesity, rheumatoid arthritis, h/o massive PE leading to cardiac arrest (2019), hypertension, b/l pleural effusion, reactive airway disease/chronic bronchitis, CYRUS who presented to ED for evaluation of dyspnea on exertion ongoing for about two weeks, admitted inpatient 4/15/25.     She was seen in clinic on 4/8/25 for dyspnea on exertion. Chest imaging at the time was noted with bilateral pleural effusion along with lung edema and elevated BNP. She used to weigh 286 pounds and in clinic she was noted to weigh 318 pounds . With concern for decompensated CHF , she was started on 20 mg PO Lasix. She had follow-up virtual visit on the day of presentation, noted with no significant clinical improvement and was sent to ED. she had been scheduled for outpatient thoracentesis on 4/23/25.     Dyspnea on exertion  likely acute CHF  Bilateral pleural effusion (right>left)  H/o cardiac arrest due to massive pulmonary embolism (2019)  Hypertension  chronic bronchitis  morbid obesity, sleep apnea  -initial presentation and recent evaluation in clinic as noted above  -was recently started on Lasix 20 mg daily PTA with no significant clinical improvement  -BNP elevated to 2254, Trop T elevated 15---17, normal TSH, INR therapeutic  3.04  -her dyspnea on exertion could be multifactorial due to CHF, pleural effusion, chronic bronchitis  -reports significant weight gain but clinically does not look significantly fluid overloaded     -Received 60 mg IV Lasix in ED and started on 40 IV BID  -was planned for outpatient thoracentesis but given no significant clinical improvement with diuresis  -INR therapeutic, less likely acute PE; respiratory status stable on room air  - radiology deferred thoracentesis until INR<2, preferably about 1.8; given past history of significant pulmonary embolism leading to cardiac arrest, hesitant to reverse INR; will hold warfarin and let INR drift down with plans for thoracentesis likely in 1 to 2 days  - diuresing well with net negative -1100 ml since admission; Cr slightly up to 1.59, still around baseline  - will decrease Lasix to 40 mg IV daily (4/16); awaiting ECHO  -will resume PTA lisinopril  -will continue with PTA inhalers; duo nebs PRN     Anxiety/depression  -resume PTA Abilify, Effexor      CKD stage III  -creatinine 1.46---1.59, stable around baseline of 1.4 to 1.6  -monitor BMP with diuresis     Likely candida intertrigo  -she has significant rash under abdominal fold  -she was admitted in 2023 with severe sepsis and neutropenia likely due to panniculitis  -will have miconazole powder PRN  -consult [facility]     Rheumatoid arthritis  -resume PTA Plaquenil     Diet: Combination Diet Regular Diet Adult      DVT Prophylaxis: on warfarin    Code status: full code    Disposition:   Medically Ready for Discharge: Anticipated in 2-4 Days  -awaiting ECHO, thoracentesis     Care plan discussed with patient, nursing and radiology    Clinically Significant Risk Factors Present on Admission               # Hypoalbuminemia: Lowest albumin = 3.3 g/dL at 4/15/2025  5:49 PM, will monitor as appropriate  # Drug Induced Coagulation Defect: home medication list includes an anticoagulant medication      # Hypertension: Home  "medication list includes antihypertensive(s)  # Acute heart failure with preserved ejection fraction: heart failure noted on problem list, last echo with EF >50%, and receiving IV diuretics              # Severe Obesity: Estimated body mass index is 50.74 kg/m  as calculated from the following:    Height as of 4/8/25: 1.676 m (5' 6\").    Weight as of this encounter: 142.6 kg (314 lb 6 oz).                                 Physical Exam:      Blood pressure 113/77, pulse 97, temperature 97.9  F (36.6  C), temperature source Oral, resp. rate 18, weight (!) 142.6 kg (314 lb 6 oz), SpO2 (!) 91%, not currently breastfeeding.  Vitals:    04/16/25 0500   Weight: (!) 142.6 kg (314 lb 6 oz)     Vital Signs with Ranges  Temp:  [97.2  F (36.2  C)-98  F (36.7  C)] 97.9  F (36.6  C)  Pulse:  [76-97] 97  Resp:  [10-20] 18  BP: (102-129)/(64-79) 113/77  SpO2:  [91 %-98 %] 91 %  I/O's Last 24 hours  I/O last 3 completed shifts:  In: -   Out: 1000 [Urine:1000]    Constitutional: Alert, awake and oriented X 3; lying comfortably in bed in no apparent distress; morbidly obese           Cardiovascular: Normal s1 s2, regular rate and rhythm, no murmur   Lungs: decreased breath sounds bilaterally , right>left, mild wheezes ; no crepitations   Abdomen: Soft, nt, no guarding, rigidity or rebound; BS +; morbidly obese   LE : trace b/l edema +   Musculoskeletal: Power 5/5 in all extremities   Neuro: No focal neurological deficits noted, CN II to XII grossly intact   Psychiatry: normal mood and affect  Skin: noted fungal rash under pannicula                Medications:        Current Facility-Administered Medications   Medication Dose Route Frequency Provider Last Rate Last Admin    furosemide (LASIX) injection 40 mg  40 mg Intravenous BID Naga Martinez MD        miconazole (MICATIN) 2 % powder   Topical BID Naga Martinez MD   Given at 04/16/25 0348    sodium chloride (PF) 0.9% PF flush 3 mL  3 mL Intracatheter Q8H CHELSIE Martinez, " Naga Saab MD         PRN Meds:   Current Facility-Administered Medications   Medication Dose Route Frequency Provider Last Rate Last Admin    acetaminophen (TYLENOL) tablet 650 mg  650 mg Oral Q4H PRN Gianluca Mera MD   650 mg at 04/16/25 0549    Or    acetaminophen (TYLENOL) Suppository 650 mg  650 mg Rectal Q4H PRN Gianluca Mera MD        calcium carbonate (TUMS) chewable tablet 1,000 mg  1,000 mg Oral 4x Daily PRN Naga Martinez MD        hydrALAZINE (APRESOLINE) injection 10 mg  10 mg Intravenous Q4H PRN Naga Martinez MD        lidocaine (LMX4) cream   Topical Q1H PRN Naga Martinez MD        lidocaine 1 % 0.1-1 mL  0.1-1 mL Other Q1H PRN Naga Martinez MD        ondansetron (ZOFRAN ODT) ODT tab 4 mg  4 mg Oral Q6H PRN Naga Martinez MD        Or    ondansetron (ZOFRAN) injection 4 mg  4 mg Intravenous Q6H PRN Naga Martinez MD        Patient is already receiving anticoagulation with heparin, enoxaparin (LOVENOX), warfarin (COUMADIN)  or other anticoagulant medication   Does not apply Continuous PRN Naga Martinez MD        senna-docusate (SENOKOT-S/PERICOLACE) 8.6-50 MG per tablet 1 tablet  1 tablet Oral BID PRNaga Nascimento MD        Or    senna-docusate (SENOKOT-S/PERICOLACE) 8.6-50 MG per tablet 2 tablet  2 tablet Oral BID PRNaga Nascimento MD        sodium chloride (PF) 0.9% PF flush 3 mL  3 mL Intracatheter q1 min prNaga Nascimento MD                Data:      All new lab and imaging data was reviewed.   Recent Labs   Lab Test 04/16/25  0719 04/15/25  1749 04/08/25  1353 04/04/25  1452 07/11/24  1501 07/11/24  1458   WBC  --  7.0 7.1  --   --  5.2   HGB  --  11.9 13.1  --   --  12.1   MCV  --  91 93  --   --  93   PLT  --  165 249  --   --  224   INR 3.08* 3.04*  --  2.8*   < >  --     < > = values in this interval not displayed.      Recent Labs   Lab Test 04/16/25  0643 04/15/25  1749 04/08/25  1353     137 142   POTASSIUM 4.4 4.4 4.8   CHLORIDE 103 102 108*   CO2 25 25 24   BUN 26.9* 25.2* 28.0*   CR 1.59* 1.46* 1.49*   ANIONGAP 10 10 10   MARIA INES 9.1 9.4 9.6   * 103* 97     Recent Labs   Lab Test 09/23/19  0300 09/21/19  0324 09/20/19 2032   TROPI <0.015 <0.015 <0.015

## 2025-04-17 ENCOUNTER — DOCUMENTATION ONLY (OUTPATIENT)
Dept: INTERNAL MEDICINE | Facility: CLINIC | Age: 65
End: 2025-04-17
Payer: COMMERCIAL

## 2025-04-17 ENCOUNTER — APPOINTMENT (OUTPATIENT)
Dept: ULTRASOUND IMAGING | Facility: CLINIC | Age: 65
DRG: 187 | End: 2025-04-17
Attending: HOSPITALIST
Payer: COMMERCIAL

## 2025-04-17 ENCOUNTER — PATIENT OUTREACH (OUTPATIENT)
Dept: CARE COORDINATION | Facility: CLINIC | Age: 65
End: 2025-04-17
Payer: COMMERCIAL

## 2025-04-17 LAB
ANION GAP SERPL CALCULATED.3IONS-SCNC: 12 MMOL/L (ref 7–15)
APPEARANCE FLD: ABNORMAL
BUN SERPL-MCNC: 36.4 MG/DL (ref 8–23)
CALCIUM SERPL-MCNC: 9 MG/DL (ref 8.8–10.4)
CHLORIDE SERPL-SCNC: 104 MMOL/L (ref 98–107)
COLOR FLD: ABNORMAL
CREAT SERPL-MCNC: 1.66 MG/DL (ref 0.51–0.95)
EGFRCR SERPLBLD CKD-EPI 2021: 34 ML/MIN/1.73M2
GLUCOSE BODY FLUID SOURCE: NORMAL
GLUCOSE FLD-MCNC: 2 MG/DL
GLUCOSE SERPL-MCNC: 106 MG/DL (ref 70–99)
GRAM STAIN RESULT: NORMAL
GRAM STAIN RESULT: NORMAL
HCO3 SERPL-SCNC: 25 MMOL/L (ref 22–29)
INR PPP: 2.42 (ref 0.85–1.15)
LDH SERPL L TO P-CCNC: 155 U/L (ref 0–250)
POTASSIUM SERPL-SCNC: 4.3 MMOL/L (ref 3.4–5.3)
PROT FLD-MCNC: 3.8 G/DL
PROT SERPL-MCNC: 7.5 G/DL (ref 6.4–8.3)
PROTEIN BODY FLUID SOURCE: NORMAL
SODIUM SERPL-SCNC: 141 MMOL/L (ref 135–145)
SPECIMEN SOURCE FLD: ABNORMAL
WBC # FLD AUTO: 3410 /UL

## 2025-04-17 PROCEDURE — G0463 HOSPITAL OUTPT CLINIC VISIT: HCPCS

## 2025-04-17 PROCEDURE — 83615 LACTATE (LD) (LDH) ENZYME: CPT | Performed by: HOSPITALIST

## 2025-04-17 PROCEDURE — 82945 GLUCOSE OTHER FLUID: CPT | Performed by: HOSPITALIST

## 2025-04-17 PROCEDURE — 36415 COLL VENOUS BLD VENIPUNCTURE: CPT | Performed by: HOSPITALIST

## 2025-04-17 PROCEDURE — 82310 ASSAY OF CALCIUM: CPT | Performed by: HOSPITALIST

## 2025-04-17 PROCEDURE — 272N000706 US THORACENTESIS

## 2025-04-17 PROCEDURE — 88305 TISSUE EXAM BY PATHOLOGIST: CPT | Mod: 26 | Performed by: PATHOLOGY

## 2025-04-17 PROCEDURE — 89050 BODY FLUID CELL COUNT: CPT | Performed by: HOSPITALIST

## 2025-04-17 PROCEDURE — 250N000013 HC RX MED GY IP 250 OP 250 PS 637: Performed by: HOSPITALIST

## 2025-04-17 PROCEDURE — 0W993ZZ DRAINAGE OF RIGHT PLEURAL CAVITY, PERCUTANEOUS APPROACH: ICD-10-PCS | Performed by: RADIOLOGY

## 2025-04-17 PROCEDURE — 250N000009 HC RX 250: Performed by: HOSPITALIST

## 2025-04-17 PROCEDURE — 99233 SBSQ HOSP IP/OBS HIGH 50: CPT | Performed by: HOSPITALIST

## 2025-04-17 PROCEDURE — 80048 BASIC METABOLIC PNL TOTAL CA: CPT | Performed by: HOSPITALIST

## 2025-04-17 PROCEDURE — 84155 ASSAY OF PROTEIN SERUM: CPT | Performed by: HOSPITALIST

## 2025-04-17 PROCEDURE — 87205 SMEAR GRAM STAIN: CPT | Performed by: HOSPITALIST

## 2025-04-17 PROCEDURE — 88112 CYTOPATH CELL ENHANCE TECH: CPT | Mod: 26 | Performed by: PATHOLOGY

## 2025-04-17 PROCEDURE — 120N000001 HC R&B MED SURG/OB

## 2025-04-17 PROCEDURE — 84157 ASSAY OF PROTEIN OTHER: CPT | Performed by: HOSPITALIST

## 2025-04-17 PROCEDURE — 85610 PROTHROMBIN TIME: CPT | Performed by: HOSPITALIST

## 2025-04-17 PROCEDURE — 88112 CYTOPATH CELL ENHANCE TECH: CPT | Mod: TC | Performed by: HOSPITALIST

## 2025-04-17 RX ORDER — LIDOCAINE HYDROCHLORIDE 10 MG/ML
10 INJECTION, SOLUTION EPIDURAL; INFILTRATION; INTRACAUDAL; PERINEURAL ONCE
Status: DISCONTINUED | OUTPATIENT
Start: 2025-04-17 | End: 2025-04-19 | Stop reason: HOSPADM

## 2025-04-17 RX ORDER — WARFARIN SODIUM 10 MG/1
10 TABLET ORAL
Status: COMPLETED | OUTPATIENT
Start: 2025-04-17 | End: 2025-04-17

## 2025-04-17 RX ORDER — FUROSEMIDE 40 MG/1
40 TABLET ORAL DAILY
Status: DISCONTINUED | OUTPATIENT
Start: 2025-04-17 | End: 2025-04-19 | Stop reason: HOSPADM

## 2025-04-17 RX ORDER — GUAIFENESIN/DEXTROMETHORPHAN 100-10MG/5
10 SYRUP ORAL EVERY 6 HOURS
Status: DISCONTINUED | OUTPATIENT
Start: 2025-04-17 | End: 2025-04-19 | Stop reason: HOSPADM

## 2025-04-17 RX ADMIN — HYDROXYCHLOROQUINE SULFATE 200 MG: 200 TABLET ORAL at 21:31

## 2025-04-17 RX ADMIN — LISINOPRIL 5 MG: 5 TABLET ORAL at 08:47

## 2025-04-17 RX ADMIN — VENLAFAXINE HYDROCHLORIDE 150 MG: 150 CAPSULE, EXTENDED RELEASE ORAL at 08:47

## 2025-04-17 RX ADMIN — HYDROXYCHLOROQUINE SULFATE 200 MG: 200 TABLET ORAL at 08:46

## 2025-04-17 RX ADMIN — ARIPIPRAZOLE 20 MG: 10 TABLET ORAL at 08:46

## 2025-04-17 RX ADMIN — MICONAZOLE NITRATE: 2 POWDER TOPICAL at 08:52

## 2025-04-17 RX ADMIN — FUROSEMIDE 40 MG: 40 TABLET ORAL at 08:46

## 2025-04-17 RX ADMIN — WARFARIN SODIUM 10 MG: 10 TABLET ORAL at 18:07

## 2025-04-17 RX ADMIN — GUAIFENESIN AND DEXTROMETHORPHAN 10 ML: 100; 10 SYRUP ORAL at 11:40

## 2025-04-17 RX ADMIN — GUAIFENESIN AND DEXTROMETHORPHAN 10 ML: 100; 10 SYRUP ORAL at 18:07

## 2025-04-17 RX ADMIN — GUAIFENESIN AND DEXTROMETHORPHAN 10 ML: 100; 10 SYRUP ORAL at 23:37

## 2025-04-17 RX ADMIN — MICONAZOLE NITRATE: 2 POWDER TOPICAL at 21:31

## 2025-04-17 RX ADMIN — IPRATROPIUM BROMIDE AND ALBUTEROL SULFATE 3 ML: .5; 3 SOLUTION RESPIRATORY (INHALATION) at 03:32

## 2025-04-17 ASSESSMENT — ACTIVITIES OF DAILY LIVING (ADL)
ADLS_ACUITY_SCORE: 45
ADLS_ACUITY_SCORE: 46
ADLS_ACUITY_SCORE: 45
ADLS_ACUITY_SCORE: 45
ADLS_ACUITY_SCORE: 46
ADLS_ACUITY_SCORE: 45
ADLS_ACUITY_SCORE: 45
ADLS_ACUITY_SCORE: 46
ADLS_ACUITY_SCORE: 45
ADLS_ACUITY_SCORE: 46
ADLS_ACUITY_SCORE: 45
ADLS_ACUITY_SCORE: 46
ADLS_ACUITY_SCORE: 45
ADLS_ACUITY_SCORE: 46
ADLS_ACUITY_SCORE: 45

## 2025-04-17 NOTE — CONSULTS
Care Management Initial Consult    General Information  Assessment completed with: PatientBernadine  Type of CM/SW Visit: Initial Assessment    Primary Care Provider verified and updated as needed: Yes   Readmission within the last 30 days: no previous admission in last 30 days      Reason for Consult: discharge planning  Advance Care Planning: Advance Care Planning Reviewed: no concerns identified          Communication Assessment  Patient's communication style: spoken language (English or Bilingual)    Hearing Difficulty or Deaf: no   Wear Glasses or Blind: yes    Cognitive  Cognitive/Neuro/Behavioral: WDL                      Living Environment:   People in home: alone     Current living Arrangements: apartment      Able to return to prior arrangements: yes       Family/Social Support:  Care provided by: self, homecare agency  Provides care for: no one, unable/limited ability to care for self  Marital Status: Single  Support system: Sibling(s)          Description of Support System: Supportive, Involved         Current Resources:   Patient receiving home care services: Yes  Skilled Home Care Services: Skilled Nursing     Community Resources: None  Equipment currently used at home: cane, straight, walker, rolling  Supplies currently used at home:      Employment/Financial:  Employment Status: retired        Financial Concerns: none   Referral to Financial Worker: No       Does the patient's insurance plan have a 3 day qualifying hospital stay waiver?  Yes     Which insurance plan 3 day waiver is available? Alternative insurance waiver    Will the waiver be used for post-acute placement? No    Lifestyle & Psychosocial Needs:  Social Drivers of Health     Food Insecurity: Low Risk  (4/16/2025)    Food Insecurity     Within the past 12 months, did you worry that your food would run out before you got money to buy more?: No     Within the past 12 months, did the food you bought just not last and you didn t have money  to get more?: No   Depression: At risk (4/8/2025)    PHQ-2     PHQ-2 Score: 3   Housing Stability: Low Risk  (4/16/2025)    Housing Stability     Do you have housing? : Yes     Are you worried about losing your housing?: No   Tobacco Use: Medium Risk (4/15/2025)    Patient History     Smoking Tobacco Use: Former     Smokeless Tobacco Use: Never     Passive Exposure: Not on file   Financial Resource Strain: Low Risk  (4/16/2025)    Financial Resource Strain     Within the past 12 months, have you or your family members you live with been unable to get utilities (heat, electricity) when it was really needed?: No   Alcohol Use: Not on file   Transportation Needs: Low Risk  (4/16/2025)    Transportation Needs     Within the past 12 months, has lack of transportation kept you from medical appointments, getting your medicines, non-medical meetings or appointments, work, or from getting things that you need?: No   Physical Activity: Unknown (6/8/2024)    Exercise Vital Sign     Days of Exercise per Week: 0 days     Minutes of Exercise per Session: Patient declined   Interpersonal Safety: High Risk (4/16/2025)    Interpersonal Safety     Do you feel physically and emotionally safe where you currently live?: No     Within the past 12 months, have you been hit, slapped, kicked or otherwise physically hurt by someone?: No     Within the past 12 months, have you been humiliated or emotionally abused in other ways by your partner or ex-partner?: No   Stress: Stress Concern Present (6/8/2024)    [employer] [place] [employer] - Occupational Stress Questionnaire     Feeling of Stress : To some extent   Social Connections: Unknown (6/8/2024)    Social Connection and Isolation Panel [NHANES]     Frequency of Communication with Friends and Family: Not on file     Frequency of Social Gatherings with Friends and Family: Once a week     Attends [employer] Services: Not on file     Active Member of Clubs or Organizations: Not on  file     Attends Club or Organization Meetings: Not on file     Marital Status: Not on file   Health Literacy: Not on file       Functional Status:  Prior to admission patient needed assistance:   Dependent ADLs:: Ambulation-walker          Mental Health Status:  Mental Health Status: No Current Concerns       Chemical Dependency Status:  Chemical Dependency Status: No Current Concerns             Values/Beliefs:  Spiritual, Cultural Beliefs, Mosque Practices, Values that affect care: no  Description of Beliefs that Will Affect Care: Congregational            Discussed  Partnership in Safe Discharge Planning  document with patient/family: Yes: Bernadine    Additional Information:  Writer met with patient to introduce self and role in discharge planning. Bernadine verified her address, phone, PCP, and insurance. She lives alone in an apartment, ambulates with a walker, has has recently been enrolled in home care with home RN services. She had a procedure today and is hoping to discharge back home tomorrow. She will call her sister for a rode home. She identifies no new needs from out team.    Next Steps: Medical readiness,     Hyun Callahan, RN Care Coordinator  [facility]  [phone]

## 2025-04-17 NOTE — PLAN OF CARE
Goal Outcome Evaluation:      Plan of Care Reviewed With: patient    Overall Patient Progress: no change     SUMMARY: Dyspnea on exertion, Acute CHF, [place] pleural effusion    DATE & TIME: 04/16/2025 [id number]      Cognitive Concerns/ Orientation: A & O x4   BEHAVIOR & AGGRESSION TOOL COLOR: Green  ABNL VS/O2: VSS on RA  MOBILITY: Assist x2 GB/W  PAIN MANAGMENT: Denied pain this shift   DIET: Regular  BOWEL/BLADDER: incontinent of bowel and bladder; Purewick in place while in bed   ABNL LAB/BG: Creat 1.59, INR 3.08, PTT 46  DRAIN/DEVICES: L PIV SL  TELEMETRY RHYTHM: NSR  SKIN: Abd. folds and under L breast rash/redness, scattered scabs/bruising, calf folds - on scheduled miconazole  TESTS/PROCEDURES: None this shift; Thoracentesis ordered when INR <2  D/C DATE: Pending   OTHER IMPORTANT INFO:

## 2025-04-17 NOTE — PROGRESS NOTES
[facility]  Hospitalist Progress Note        Naga Martinez MD   04/17/2025        Interval History:        - ECHO 4/16 noted normal LVEF 55%, moderate calcific aortic valve stenosis (area by Doppler is 1.4 cmÂ ), Mild aortic regurgitation. Trivial mitral regurgitation. No significant change compared to prior exam from 1/30/2023.; Diastolic function indeterminate  - reports frequent productive cough; will schedule Robitussin DM q 6 hrs  - INR trended down--2.42 and had US guided thoracentesis (4/17) with removal of 1100 ml dark bloody red fluid; fluid culture and cytology pending  - Cr slightly trending up--->1.66; clinically does not look significantly fluid overloaded, will switch IV Lasix to 40 mg PO daily (4/18) and will hold off on PTA lisinopril  - will monitor BMP and Hb         Assessment and Plan:        Shira Rodriguez is a 64 year old female with PMH significant for morbid obesity, rheumatoid arthritis, h/o massive PE leading to cardiac arrest (2019), hypertension, b/l pleural effusion, reactive airway disease/chronic bronchitis, CYRUS who presented to ED for evaluation of dyspnea on exertion ongoing for about two weeks, admitted inpatient 4/15/25.     She was seen in clinic on 4/8/25 for dyspnea on exertion. Chest imaging at the time was noted with bilateral pleural effusion along with lung edema and elevated BNP. She used to weigh 286 pounds and in clinic she was noted to weigh 318 pounds . With concern for decompensated CHF , she was started on 20 mg PO Lasix. She had follow-up virtual visit on the day of presentation, noted with no significant clinical improvement and was sent to ED. she had been scheduled for outpatient thoracentesis on 4/23/25.     Dyspnea on exertion  likely acute CHF  Bilateral pleural effusion (right>left)  Hypertension  moderate aortic stenosis (ECHO 4/2025)  chronic bronchitis  morbid obesity, sleep apnea  - initial presentation and recent evaluation in  clinic as noted above  - was recently started on Lasix 20 mg daily PTA with no significant clinical improvement  - BNP elevated to 2254, Trop T elevated 15---17, normal TSH, INR therapeutic 3.04  - her dyspnea on exertion could be multifactorial due to CHF, pleural effusion, chronic bronchitis  - reports significant weight gain but clinically does not look significantly fluid overloaded  - ECHO 4/16 noted normal LVEF 55%, moderate calcific aortic valve stenosis (area by Doppler is 1.4 cmÂ ), Mild aortic regurgitation. Trivial mitral regurgitation. No significant change compared to prior exam from 1/30/2023.; Diastolic function indeterminate     - Received 60 mg IV Lasix in ED and started on 40 IV BID  - INR therapeutic, less likely acute PE; respiratory status stable on room air  - diuresing well with net negative -2200 ml since admission  - reports frequent productive cough; will schedule Robitussin DM q 6 hrs  - INR trended down--2.42 and had US guided thoracentesis (4/17) with removal of 1100 ml dark bloody red fluid; fluid culture and cytology pending  - Cr slightly trending up--->1.66; clinically does not look significantly fluid overloaded, will switch IV Lasix to 40 mg PO daily (4/18) and will hold off on PTA lisinopril (4/18)  -an outpatient cardiology referral has already been made by her clinic provider  - will monitor BMP and Hb  - will continue with PTA inhalers; duo nebs PRN    H/o cardiac arrest due to massive pulmonary embolism (2019)  -INR therapeutic on admission; warfarin was held for thoracentesis  -will have pharmacy dose Coumadin (4/17)    Anxiety/depression  -resume PTA Abilify, Effexor      CKD stage III  -creatinine 1.46---1.59--1.66, stable around baseline of 1.4 to 1.6  -monitor BMP with diuresis  -plan to hold lisinopril 4/18 as noted above     Likely candida intertrigo  -she has significant rash under abdominal fold  -she was admitted in 2023 with severe sepsis and neutropenia likely due  "to panniculitis  -will have miconazole powder PRN  - WOC consulted     Rheumatoid arthritis  - continue PTA Plaquenil     Diet: Combination Diet Regular Diet Adult      DVT Prophylaxis: on warfarin    Code status: full code    Disposition:   Medically Ready for Discharge: Anticipated in 2-4 Days pending clinical stability    High medical complexity    Care plan discussed with patient, nursing and radiology    Clinically Significant Risk Factors Present on Admission               # Hypoalbuminemia: Lowest albumin = 3.3 g/dL at 4/15/2025  5:49 PM, will monitor as appropriate    # Coagulation Defect: INR = 2.42 (Ref range: 0.85 - 1.15) and/or PTT = 46 Seconds (Ref range: 22 - 38 Seconds), will monitor for bleeding         # Acute heart failure with preserved ejection fraction: heart failure noted on problem list, last echo with EF >50%, and receiving IV diuretics               # Severe Obesity: Estimated body mass index is 51.28 kg/m  as calculated from the following:    Height as of 4/8/25: 1.676 m (5' 6\").    Weight as of this encounter: 144.1 kg (317 lb 10.9 oz).  , PRESENT ON ADMISSION                             Physical Exam:      Blood pressure 112/74, pulse 98, temperature 97.9  F (36.6  C), temperature source Oral, resp. rate 22, weight (!) 144.1 kg (317 lb 10.9 oz), SpO2 92%, not currently breastfeeding.  Vitals:    04/16/25 0500 04/17/25 0600   Weight: (!) 142.6 kg (314 lb 6 oz) (!) 144.1 kg (317 lb 10.9 oz)     Vital Signs with Ranges  Temp:  [97.6  F (36.4  C)-97.9  F (36.6  C)] 97.9  F (36.6  C)  Pulse:  [92-98] 98  Resp:  [20-22] 22  BP: (112-134)/(74-80) 112/74  SpO2:  [92 %-98 %] 92 %  I/O's Last 24 hours  I/O last 3 completed shifts:  In: 710 [P.O.:710]  Out: 1950 [Urine:1950]    Constitutional: Alert, awake and oriented X 3; lying comfortably in bed in no apparent distress; morbidly obese           Cardiovascular: Normal s1 s2, regular rate and rhythm, no murmur   Lungs: decreased breath sounds " bilaterally , right>left, mild wheezes ; no crepitations   Abdomen: Soft, nt, no guarding, rigidity or rebound; BS +; morbidly obese   LE : No edema   Musculoskeletal: Power 5/5 in all extremities   Neuro: No focal neurological deficits noted, CN II to XII grossly intact   Psychiatry: normal mood and affect           Medications:        Current Facility-Administered Medications   Medication Dose Route Frequency Provider Last Rate Last Admin    ARIPiprazole (ABILIFY) tablet 20 mg  20 mg Oral Daily Naga Martinez MD   20 mg at 04/16/25 1536    furosemide (LASIX) injection 40 mg  40 mg Intravenous Daily Naga Martinez MD        [Held by provider] furosemide (LASIX) tablet 20 mg  20 mg Oral Daily Naga Martinez MD        hydroxychloroquine (PLAQUENIL) tablet 200 mg  200 mg Oral BID Naga Martinez MD   200 mg at 04/16/25 2102    lisinopril (ZESTRIL) tablet 5 mg  5 mg Oral Daily Naga Martinez MD        miconazole (MICATIN) 2 % powder   Topical BID Naga Martinez MD   Given at 04/16/25 2102    sodium chloride (PF) 0.9% PF flush 3 mL  3 mL Intracatheter Q8H CHELSIE Naga Martinez MD   3 mL at 04/16/25 2102    venlafaxine (EFFEXOR XR) 24 hr capsule 150 mg  150 mg Oral Daily Naga Martinez MD   150 mg at 04/16/25 1536     PRN Meds:   Current Facility-Administered Medications   Medication Dose Route Frequency Provider Last Rate Last Admin    acetaminophen (TYLENOL) tablet 650 mg  650 mg Oral Q4H PRN Gianluca Mera MD   650 mg at 04/16/25 0549    Or    acetaminophen (TYLENOL) Suppository 650 mg  650 mg Rectal Q4H PRN Gianluca Mera MD        albuterol (PROVENTIL HFA/VENTOLIN HFA) inhaler  2 puff Inhalation Q6H PRN Naga Martinez MD        calcium carbonate (TUMS) chewable tablet 1,000 mg  1,000 mg Oral 4x Daily PRN Naga Martinez MD        guaiFENesin (MUCINEX) 12 hr tablet 600 mg  600 mg Oral BID PRN Naga Martinez MD        hydrALAZINE  (APRESOLINE) injection 10 mg  10 mg Intravenous Q4H PRN Naga Martinez MD        ipratropium - albuterol 0.5 mg/2.5 mg/3 mL (DUONEB) neb solution 3 mL  3 mL Nebulization Q4H PRN Naga Martinez MD   3 mL at 04/17/25 0332    lidocaine (LMX4) cream   Topical Q1H PRN Naga Martinez MD        lidocaine 1 % 0.1-1 mL  0.1-1 mL Other Q1H PRN Naga Martinez MD        ondansetron (ZOFRAN ODT) ODT tab 4 mg  4 mg Oral Q6H PRN Naga Martinez MD        Or    ondansetron (ZOFRAN) injection 4 mg  4 mg Intravenous Q6H PRN aNga Martinez MD        Patient is already receiving anticoagulation with heparin, enoxaparin (LOVENOX), warfarin (COUMADIN)  or other anticoagulant medication   Does not apply Continuous PRN Naga Martinez MD        senna-docusate (SENOKOT-S/PERICOLACE) 8.6-50 MG per tablet 1 tablet  1 tablet Oral BID PRN Naga Martinez MD        Or    senna-docusate (SENOKOT-S/PERICOLACE) 8.6-50 MG per tablet 2 tablet  2 tablet Oral BID PRNaga Allen MD        sodium chloride (PF) 0.9% PF flush 3 mL  3 mL Intracatheter q1 min prn Naga Martinez MD                Data:      All new lab and imaging data was reviewed.   Recent Labs   Lab Test 04/17/25  0642 04/16/25  0719 04/15/25  1749 04/08/25  1353 07/11/24  1501 07/11/24  1458   WBC  --   --  7.0 7.1  --  5.2   HGB  --   --  11.9 13.1  --  12.1   MCV  --   --  91 93  --  93   PLT  --   --  165 249  --  224   INR 2.42* 3.08* 3.04*  --    < >  --     < > = values in this interval not displayed.      Recent Labs   Lab Test 04/17/25  0642 04/16/25  0643 04/15/25  1749    138 137   POTASSIUM 4.3 4.4 4.4   CHLORIDE 104 103 102   CO2 25 25 25   BUN 36.4* 26.9* 25.2*   CR 1.66* 1.59* 1.46*   ANIONGAP 12 10 10   MARIA INES 9.0 9.1 9.4   * 104* 103*     Recent Labs   Lab Test 09/23/19  0300 09/21/19  0324 09/20/19 2032   TROPI <0.015 <0.015 <0.015

## 2025-04-17 NOTE — PROGRESS NOTES
Type of Form Received: [facility] [id number], HHC/POC 4/10-6/8 [id number]    Form Received (Date) 4/17/25   Form Filled out No   Placed in provider folder Yes

## 2025-04-17 NOTE — PROGRESS NOTES
"SPIRITUAL HEALTH SERVICES  SPIRITUAL ASSESSMENT Progress Note  [facility]. [address] medical specialties     REFERRAL SOURCE: assessment of spiritual and emotional needs because patient responded \"Yes\" to the question in the admission assessment, \"Do you have any spiritual or Cheondoism beliefs that will affect your care?\" RN entered comment \"Scientology.\"    Brief visit with Shira who affirmed her diana as Scientology, declined to have her demographic information updated to reflect this stating she is not interested in this showing on a list for a Eucharistic 's visit.  Stated no needs at this time and is aware of how to request support via nursing staff.    Spiritual Health remains available at patient's request for the duration of admission.     Berna Gtz [facility]  Staff   Please place consult order for routine Spiritual Health Services referrals.  SHS available 24/7 for emergent requests either by having the on-call  paged or by entering an ASAP/STAT consult in Epic (this will also page the on-call ).    "

## 2025-04-17 NOTE — PHARMACY-ANTICOAGULATION SERVICE
Clinical Pharmacy - Warfarin Dosing Consult     Pharmacy has been consulted to manage this patient s warfarin therapy.  Indication: DVT/ PE Treatment  Therapy Goal: INR 2-3  Warfarin Prior to Admission: Yes  Warfarin PTA Regimen: 10mg daily or per INR/coumadin clinic.    INR   Date Value Ref Range Status   04/17/2025 2.42 (H) 0.85 - 1.15 Final   04/16/2025 3.08 (H) 0.85 - 1.15 Final       Recommend warfarin 10 mg today.  Pharmacy will monitor Shira Rodriguez daily and order warfarin doses to achieve specified goal.      Please contact pharmacy as soon as possible if the warfarin needs to be held for a procedure or if the warfarin goals change.

## 2025-04-17 NOTE — CONSULTS
"[facility]  WO Nurse Inpatient Assessment     Consulted for: \"rash under breasts and abdomen folds\"    Summary: POA redness and moisture noted to abdominal and bilateral breast skin folds.    [facility] nurse follow-up plan: signing off    Patient History (according to provider note(s):      \"Shira Rodriguez is a 64 year old female with PMH significant for morbid obesity, rheumatoid arthritis, h/o massive PE leading to cardiac arrest (2019), hypertension, b/l pleural effusion, reactive airway disease/chronic bronchitis, CYRUS who presented to ED for evaluation of dyspnea on exertion ongoing for about two weeks, admitted inpatient 4/15/25.     She was seen in clinic on 4/8/25 for dyspnea on exertion. Chest imaging at the time was noted with bilateral pleural effusion along with lung edema and elevated BNP. She used to weigh 286 pounds and in clinic she was noted to weigh 318 pounds . With concern for decompensated CHF , she was started on 20 mg PO Lasix. She had follow-up virtual visit on the day of presentation, noted with no significant clinical improvement and was sent to ED. she had been scheduled for outpatient thoracentesis on 4/23/25.\"    Assessment:      Areas visualized during today's visit: Focused: and Skin folds     Skin Injury Location:     Right abdominal fold      Left abdominal fold      Left breast fold      Right breast fold    Last photo: 4/17  Skin injury due to: Fungal rash , Intertrigo, and Moisture associated skin damage (MASD)  Skin history and plan of care:   Patient reports having intermittent fungal rashes prior to admission. At home, she has used Nystatin powder and paper towels with good results. Miconazole powder has been ordered. Interdry Ag and powder were present in all folds on assessment. Education provided on why the two can't be used together. Will continue with the miconazole powder as ordered.  Affected area:      Skin assessment: Intact, Erythema, " Lesions-flat, Lesions-satellite, and Rash     Measurements (length x width x depth, in cm) none      Color: normal and consistent with surrounding tissue     Temperature  normal      Drainage: none .      Color: none      Odor: mild and offensive  Pain: denies  and no grimacing or signs of discomfort, none  Pain interventions prior to dressing change: patient tolerated well and slow and gentle cares   Treatment goal: Heal , Decrease moisture, and Simplify plan of care  STATUS: initial assessment  Supplies ordered: discussed with RN and discussed with patient        Treatment Plan:     Abdominal and breast folds: BID  Cleanse skin with Bandar spray and soft white Sylvester cloths   Apply Miconazole powder as ordered & dust off excess  Please do NOT use Interdry Ag     Orders: Written    RECOMMEND PRIMARY TEAM ORDER: None, at this time  Education provided: plan of care, wound progress, Infection prevention , Moisture management, and Hygiene  Discussed plan of care with: Patient and Nurse  Notify WOC if wound(s) deteriorate.  Nursing to notify the Provider(s) and re-consult the WOC Nurse if new skin concern.    DATA:     Current support surface: Standard  Standard gel mattress (Isoflex)  Containment of urine/stool: Continent of bowel and Suction based external urinary catheter   BMI: Body mass index is 51.28 kg/m .   Active diet order: Orders Placed This Encounter      Combination Diet Regular Diet Adult       Output:   I/O last 3 completed shifts:  In: 710 [P.O.:710]  Out: 1950 [Urine:1950]     Labs:   Recent Labs   Lab 04/17/25  0642 04/16/25  0719 04/15/25  1749   ALBUMIN  --   --  3.3*   HGB  --   --  11.9   INR 2.42*   < > 3.04*   WBC  --   --  7.0    < > = values in this interval not displayed.     Pressure injury risk assessment:   Sensory Perception: 4-->no impairment  Moisture: 3-->occasionally moist  Activity: 2-->chairfast  Mobility: 3-->slightly limited  Nutrition: 2-->probably inadequate  Friction and Shear:  2-->potential problem  Ole Score: 16    Neeta Patton RN CWCN  Pager no longer is use, please contact through [employer] group: [facility] Nurse Raoul

## 2025-04-17 NOTE — PLAN OF CARE
Goal Outcome Evaluation:         SUMMARY: Dyspnea on exertion, Acute CHF, Isrrael pleural effusion    DATE & TIME: 04/16/2025-4/17/25 23:0-07:30      Cognitive Concerns/ Orientation: A & O x4   BEHAVIOR & AGGRESSION TOOL COLOR: Green  ABNL VS/O2: VSS on RA  MOBILITY: Assist x2 GB/W, not OOB this shift  PAIN MANAGMENT: Denies pain   DIET: Regular  BOWEL/BLADDER: incontinent of bladder; Purewick in place while in bed, can be continent of bowel  ABNL LAB/BG: Creat 1.59, INR 3.08, PTT 46  DRAIN/DEVICES: L PIV SL  TELEMETRY RHYTHM: NSR  SKIN: Abd. folds and under L breast rash/redness, scattered scabs/bruising, calf folds - on scheduled miconazole  TESTS/PROCEDURES: None this shift  D/C DATE: Pending     OTHER IMPORTANT INFO: (+) wheezes, frequent productive cough, PRN neb given x1. Thoracentesis ordered when INR <2

## 2025-04-17 NOTE — PROGRESS NOTES
[facility]  Hospital Monitoring    Clinical Data: Patient was identified from [facility] payor report and is currently admitted to the inpatient facility below:    Facility Name: [facility]    Assessment/Plan: Patient will be monitored by Ambulatory Care Management to identify patient's discharge plans/need. Care  will review chart every 2-3 days to monitor for discharge date and disposition.    Dara Millard  [facility]

## 2025-04-18 ENCOUNTER — MEDICAL CORRESPONDENCE (OUTPATIENT)
Dept: HEALTH INFORMATION MANAGEMENT | Facility: CLINIC | Age: 65
End: 2025-04-18
Payer: COMMERCIAL

## 2025-04-18 VITALS
DIASTOLIC BLOOD PRESSURE: 68 MMHG | TEMPERATURE: 98.9 F | RESPIRATION RATE: 18 BRPM | WEIGHT: 293 LBS | OXYGEN SATURATION: 92 % | HEART RATE: 104 BPM | BODY MASS INDEX: 51.28 KG/M2 | SYSTOLIC BLOOD PRESSURE: 103 MMHG

## 2025-04-18 DIAGNOSIS — Z53.9 DIAGNOSIS NOT YET DEFINED: Primary | ICD-10-CM

## 2025-04-18 LAB
ANION GAP SERPL CALCULATED.3IONS-SCNC: 12 MMOL/L (ref 7–15)
BUN SERPL-MCNC: 47.4 MG/DL (ref 8–23)
CALCIUM SERPL-MCNC: 8.8 MG/DL (ref 8.8–10.4)
CHLORIDE SERPL-SCNC: 102 MMOL/L (ref 98–107)
CREAT SERPL-MCNC: 1.69 MG/DL (ref 0.51–0.95)
EGFRCR SERPLBLD CKD-EPI 2021: 34 ML/MIN/1.73M2
GLUCOSE SERPL-MCNC: 98 MG/DL (ref 70–99)
HCO3 SERPL-SCNC: 22 MMOL/L (ref 22–29)
HGB BLD-MCNC: 11.2 G/DL (ref 11.7–15.7)
INR PPP: 2 (ref 0.85–1.15)
POTASSIUM SERPL-SCNC: 4.4 MMOL/L (ref 3.4–5.3)
SODIUM SERPL-SCNC: 136 MMOL/L (ref 135–145)

## 2025-04-18 PROCEDURE — 80048 BASIC METABOLIC PNL TOTAL CA: CPT | Performed by: HOSPITALIST

## 2025-04-18 PROCEDURE — 36415 COLL VENOUS BLD VENIPUNCTURE: CPT | Performed by: HOSPITALIST

## 2025-04-18 PROCEDURE — 82310 ASSAY OF CALCIUM: CPT | Performed by: HOSPITALIST

## 2025-04-18 PROCEDURE — 85610 PROTHROMBIN TIME: CPT | Performed by: HOSPITALIST

## 2025-04-18 PROCEDURE — 99232 SBSQ HOSP IP/OBS MODERATE 35: CPT | Performed by: HOSPITALIST

## 2025-04-18 PROCEDURE — 120N000001 HC R&B MED SURG/OB

## 2025-04-18 PROCEDURE — 85018 HEMOGLOBIN: CPT | Performed by: HOSPITALIST

## 2025-04-18 PROCEDURE — 250N000013 HC RX MED GY IP 250 OP 250 PS 637: Performed by: HOSPITALIST

## 2025-04-18 RX ORDER — WARFARIN SODIUM 10 MG/1
10 TABLET ORAL
Status: DISCONTINUED | OUTPATIENT
Start: 2025-04-18 | End: 2025-04-18

## 2025-04-18 RX ADMIN — HYDROXYCHLOROQUINE SULFATE 200 MG: 200 TABLET ORAL at 10:04

## 2025-04-18 RX ADMIN — GUAIFENESIN AND DEXTROMETHORPHAN 10 ML: 100; 10 SYRUP ORAL at 05:44

## 2025-04-18 RX ADMIN — WARFARIN SODIUM 12.5 MG: 10 TABLET ORAL at 17:14

## 2025-04-18 RX ADMIN — VENLAFAXINE HYDROCHLORIDE 150 MG: 150 CAPSULE, EXTENDED RELEASE ORAL at 10:04

## 2025-04-18 RX ADMIN — MICONAZOLE NITRATE: 2 POWDER TOPICAL at 10:04

## 2025-04-18 RX ADMIN — HYDROXYCHLOROQUINE SULFATE 200 MG: 200 TABLET ORAL at 21:07

## 2025-04-18 RX ADMIN — GUAIFENESIN AND DEXTROMETHORPHAN 10 ML: 100; 10 SYRUP ORAL at 12:07

## 2025-04-18 RX ADMIN — SENNOSIDES AND DOCUSATE SODIUM 2 TABLET: 50; 8.6 TABLET ORAL at 10:11

## 2025-04-18 RX ADMIN — ARIPIPRAZOLE 20 MG: 10 TABLET ORAL at 10:04

## 2025-04-18 RX ADMIN — FUROSEMIDE 40 MG: 40 TABLET ORAL at 10:04

## 2025-04-18 RX ADMIN — MICONAZOLE NITRATE: 2 POWDER TOPICAL at 21:08

## 2025-04-18 RX ADMIN — GUAIFENESIN AND DEXTROMETHORPHAN 10 ML: 100; 10 SYRUP ORAL at 17:14

## 2025-04-18 ASSESSMENT — ACTIVITIES OF DAILY LIVING (ADL)
ADLS_ACUITY_SCORE: 45
ADLS_ACUITY_SCORE: 46
ADLS_ACUITY_SCORE: 45

## 2025-04-18 NOTE — PROGRESS NOTES
[facility]  Hospitalist Progress Note        Naga Martinez MD   04/18/2025        Interval History:        - Reports feeling slightly better; had thoracentesis on 4/17; respiratory status remains stable on room air  - pleural fluid culture with no growth so far; cytology pending  - Cr likely stabilizing 1.66---1.69; continue to hold lisinopril for now  - will get PT/OT eval; SW following with plans to resume home care PT/OT/SN         Assessment and Plan:        Shira Rodriguez is a 64 year old female with PMH significant for morbid obesity, rheumatoid arthritis, h/o massive PE leading to cardiac arrest (2019), hypertension, b/l pleural effusion, reactive airway disease/chronic bronchitis, CYRUS who presented to ED for evaluation of dyspnea on exertion ongoing for about two weeks, admitted inpatient 4/15/25.     She was seen in clinic on 4/8/25 for dyspnea on exertion. Chest imaging at the time was noted with bilateral pleural effusion along with lung edema and elevated BNP. She used to weigh 286 pounds and in clinic she was noted to weigh 318 pounds . With concern for decompensated CHF , she was started on 20 mg PO Lasix. She had follow-up virtual visit on the day of presentation, noted with no significant clinical improvement and was sent to ED. she had been scheduled for outpatient thoracentesis on 4/23/25.     Dyspnea on exertion  likely acute CHF preserved EF  Bilateral pleural effusion (right>left), s/p thoracentesis (4/17/25)  Hypertension  moderate aortic stenosis (ECHO 4/2025)  chronic bronchitis  morbid obesity, sleep apnea  - initial presentation and recent evaluation in clinic as noted above  - was recently started on Lasix 20 mg daily PTA with no significant clinical improvement  - BNP elevated to 2254, Trop T elevated 15---17, normal TSH, INR therapeutic 3.04  - her dyspnea on exertion could be multifactorial due to CHF, pleural effusion, chronic bronchitis  - reports  significant weight gain but clinically does not look significantly fluid overloaded  - ECHO 4/16 noted normal LVEF 55%, moderate calcific aortic valve stenosis (area by Doppler is 1.4 cmÂ ), Mild aortic regurgitation. Trivial mitral regurgitation. No significant change compared to prior exam from 1/30/2023.; Diastolic function indeterminate     - Received 60 mg IV Lasix in ED and started on 40 IV BID  - INR therapeutic on presentation, less likely acute PE; respiratory status stable on room air  - reports frequent productive cough; scheduled Robitussin DM q 6 hrs  - INR trended down--2.42 and had US guided thoracentesis (4/17) with removal of 1100 ml dark bloody red fluid; fluid culture and cytology pending  - Cr slightly trended up--->1.66; clinically does not look significantly fluid overloaded, switch IV Lasix to 40 mg PO daily (4/18) and will hold off on PTA lisinopril (4/18)  - an outpatient cardiology referral has already been made by her clinic provider  - will monitor BMP and Hb  - will continue with PTA inhalers; duo nebs PRN    H/o cardiac arrest due to massive pulmonary embolism (2019)  -INR therapeutic on admission; warfarin was held for thoracentesis  -pharmacy to dose Coumadin (4/17)    Anxiety/depression  -continue PTA Abilify, Effexor      CKD stage III  -creatinine 1.46---1.59--1.66--- 1.69, stable around baseline of 1.4 to 1.6  -monitor BMP with diuresis  -plan to hold lisinopril 4/18 as noted above     Likely candida intertrigo  -she has significant rash under abdominal fold  -she was admitted in 2023 with severe sepsis and neutropenia likely due to panniculitis  -miconazole powder PRN  - WOC consulted     Rheumatoid arthritis  - continue PTA Plaquenil     Diet: Combination Diet Regular Diet Adult      DVT Prophylaxis: on warfarin    Code status: full code    Disposition:   Medically Ready for Discharge: Anticipated Tomorrow pending clinical stability  - will get PT/OT eval; SW following with  "plans to resume home care PT/OT/SN    Care plan discussed with patient and nursing.    Clinically Significant Risk Factors Present on Admission               # Hypoalbuminemia: Lowest albumin = 3.3 g/dL at 4/15/2025  5:49 PM, will monitor as appropriate            # Acute heart failure with preserved ejection fraction: heart failure noted on problem list, last echo with EF >50%, and receiving IV diuretics               # Severe Obesity: Estimated body mass index is 50.17 kg/m  as calculated from the following:    Height as of 4/8/25: 1.676 m (5' 6\").    Weight as of this encounter: 141 kg (310 lb 13.6 oz).  , PRESENT ON ADMISSION     # Financial/Environmental Concerns: none                          Physical Exam:      Blood pressure 114/68, pulse 81, temperature 98.4  F (36.9  C), temperature source Oral, resp. rate 16, weight (!) 141 kg (310 lb 13.6 oz), SpO2 92%, not currently breastfeeding.  Vitals:    04/16/25 0500 04/17/25 0600 04/18/25 0500   Weight: (!) 142.6 kg (314 lb 6 oz) (!) 144.1 kg (317 lb 10.9 oz) (!) 141 kg (310 lb 13.6 oz)     Vital Signs with Ranges  Temp:  [98.2  F (36.8  C)-98.9  F (37.2  C)] 98.4  F (36.9  C)  Pulse:  [] 81  Resp:  [16-18] 16  BP: ()/(56-73) 114/68  SpO2:  [92 %-95 %] 92 %  I/O's Last 24 hours  I/O last 3 completed shifts:  In: 990 [P.O.:990]  Out: 650 [Urine:650]    Constitutional: Alert, awake and oriented X 3; lying comfortably in bed in no apparent distress; morbidly obese           Cardiovascular: Normal s1 s2, regular rate and rhythm, no murmur   Lungs: improved aeration bilaterally ; no significant wheezes or crepitations   Abdomen: Soft, nt, no guarding, rigidity or rebound; BS +; morbidly obese   LE : No edema   Musculoskeletal: Power 5/5 in all extremities   Neuro: No focal neurological deficits noted, CN II to XII grossly intact   Psychiatry: normal mood and affect           Medications:        Current Facility-Administered Medications   Medication Dose " Route Frequency Provider Last Rate Last Admin    ARIPiprazole (ABILIFY) tablet 20 mg  20 mg Oral Daily Naga Martinez MD   20 mg at 04/17/25 0846    furosemide (LASIX) tablet 40 mg  40 mg Oral Daily Naga Martinez MD   40 mg at 04/17/25 0846    guaiFENesin-dextromethorphan (ROBITUSSIN DM) 100-10 MG/5ML syrup 10 mL  10 mL Oral Q6H Naga Martinez MD   10 mL at 04/18/25 0544    hydroxychloroquine (PLAQUENIL) tablet 200 mg  200 mg Oral BID Naga Martinez MD   200 mg at 04/17/25 2131    lidocaine (PF) (XYLOCAINE) 1 % injection 10 mL  10 mL Subcutaneous Once Timothy Parker MD        [Held by provider] lisinopril (ZESTRIL) tablet 5 mg  5 mg Oral Daily Naga Martinez MD   5 mg at 04/17/25 0847    miconazole (MICATIN) 2 % powder   Topical BID Naga Martinez MD   Given at 04/17/25 2131    sodium chloride (PF) 0.9% PF flush 3 mL  3 mL Intracatheter Q8H CHELSIE Naga Martinez MD   3 mL at 04/18/25 0544    venlafaxine (EFFEXOR XR) 24 hr capsule 150 mg  150 mg Oral Daily Naga Martinez MD   150 mg at 04/17/25 0847    Warfarin Dose Required Daily - Pharmacist Managed  1 each Oral See Admin Instructions Naga Martinez MD         PRN Meds:   Current Facility-Administered Medications   Medication Dose Route Frequency Provider Last Rate Last Admin    acetaminophen (TYLENOL) tablet 650 mg  650 mg Oral Q4H PRN Gianluca Mera MD   650 mg at 04/16/25 0549    Or    acetaminophen (TYLENOL) Suppository 650 mg  650 mg Rectal Q4H PRN Gianluca Mera MD        albuterol (PROVENTIL HFA/VENTOLIN HFA) inhaler  2 puff Inhalation Q6H PRN Naga Martinez MD        calcium carbonate (TUMS) chewable tablet 1,000 mg  1,000 mg Oral 4x Daily PRN Naga Martinez MD        guaiFENesin (MUCINEX) 12 hr tablet 600 mg  600 mg Oral BID PRN Naga Martinez MD        hydrALAZINE (APRESOLINE) injection 10 mg  10 mg Intravenous Q4H PRN Naga Martinez MD         ipratropium - albuterol 0.5 mg/2.5 mg/3 mL (DUONEB) neb solution 3 mL  3 mL Nebulization Q4H PRN Naga Martinez MD   3 mL at 04/17/25 0332    lidocaine (LMX4) cream   Topical Q1H PRN Naga Martinez MD        lidocaine 1 % 0.1-1 mL  0.1-1 mL Other Q1H PRN Naga Martinez MD        ondansetron (ZOFRAN ODT) ODT tab 4 mg  4 mg Oral Q6H PRN Naga Martinez MD        Or    ondansetron (ZOFRAN) injection 4 mg  4 mg Intravenous Q6H PRN Naga Martinez MD        Patient is already receiving anticoagulation with heparin, enoxaparin (LOVENOX), warfarin (COUMADIN)  or other anticoagulant medication   Does not apply Continuous PRN Naga Martinez MD        senna-docusate (SENOKOT-S/PERICOLACE) 8.6-50 MG per tablet 1 tablet  1 tablet Oral BID PRN Naga Martinez MD        Or    senna-docusate (SENOKOT-S/PERICOLACE) 8.6-50 MG per tablet 2 tablet  2 tablet Oral BID PRN Naga Martinez MD        sodium chloride (PF) 0.9% PF flush 3 mL  3 mL Intracatheter q1 min prn Naga Martinez MD                Data:      All new lab and imaging data was reviewed.   Recent Labs   Lab Test 04/18/25  0628 04/17/25  0642 04/16/25  0719 04/15/25  1749 04/08/25  1353 07/11/24  1501 07/11/24  1458   WBC  --   --   --  7.0 7.1  --  5.2   HGB 11.2*  --   --  11.9 13.1  --  12.1   MCV  --   --   --  91 93  --  93   PLT  --   --   --  165 249  --  224   INR 2.00* 2.42* 3.08* 3.04*  --    < >  --     < > = values in this interval not displayed.      Recent Labs   Lab Test 04/18/25  0628 04/17/25  0642 04/16/25  0643    141 138   POTASSIUM 4.4 4.3 4.4   CHLORIDE 102 104 103   CO2 22 25 25   BUN 47.4* 36.4* 26.9*   CR 1.69* 1.66* 1.59*   ANIONGAP 12 12 10   MARIA INES 8.8 9.0 9.1   GLC 98 106* 104*     Recent Labs   Lab Test 09/23/19  0300 09/21/19  0324 09/20/19 2032   TROPI <0.015 <0.015 <0.015

## 2025-04-18 NOTE — PLAN OF CARE
SUMMARY: Dyspnea on exertion, Acute CHF, Isrrael pleural effusion    DATE & TIME: 4/17/25 [id number]  Cognitive Concerns/ Orientation: A & O x4   BEHAVIOR & AGGRESSION TOOL COLOR: Green  ABNL VS/O2: VSS on RA- slight hypotension earlier this shift (asymptomatic), resolved on it's own.  MOBILITY: Assist x1 GB/W- up in chair most of shift  PAIN MANAGMENT: Denies pain   DIET: Regular- good appetite. Strict I&O  BOWEL/BLADDER: incontinent of bladder at times; Purewick in place while in bed, can be continent of bowel  ABNL LAB/BG:  INR 2.42  DRAIN/DEVICES: L PIV SL  TELEMETRY RHYTHM: tele discontinued  SKIN: Rash/redness in Abd. folds and under L breast, scattered scabs/bruising, calf folds - cleansed, dried and miconazole powder applied. Thoracentesis site R back- bandaid CDI  TESTS/PROCEDURES: None this shift  D/C DATE: Pending improvement, now off IV lasix, thoracentesis completed today    OTHER IMPORTANT INFO: (+) wheezes, frequent productive cough, scheduled robitussin given x2

## 2025-04-18 NOTE — PLAN OF CARE
Goal Outcome Evaluation: Progressing    4/15/25 admit, SOB  4/18/25, 7 a to 3 p    Orientation: oriented x 4, pleasant    Vitals/Tele: VSS on RA, no pain    IV Access/drains:PIV SL    Diet: Regular    Mobility: A1 GB and W, close to baseline    GI/: Continent of urine, Constipated, started with Bowel 2 tabs Senna, continue giving until BM, patient reported history of constipation    Wound/Skin: Wound care ordered for groin and under the L breast, patient wanted to do it later    Consults: Hospitalist following    Discharge Plan: PT/OT consult pending       See Flow sheets for assessment

## 2025-04-18 NOTE — PLAN OF CARE
Goal Outcome Evaluation:             SUMMARY: Dyspnea on exertion, Acute CHF, Isrrael pleural effusion    DATE & TIME: 4/17/25-4/18/25 23:00-07:30    Cognitive Concerns/ Orientation: A & O x4   BEHAVIOR & AGGRESSION TOOL COLOR: Green  ABNL VS/O2: VSS on RA-soft /68  MOBILITY: Assist x1 GB/W-not OOB this shift  PAIN MANAGMENT: Denies pain   DIET: Regular- Strict I&O  BOWEL/BLADDER: incontinent of bladder at times; Purewick in place while in bed, can be continent of bowel  ABNL LAB/BG:  INR 2.42  DRAIN/DEVICES: L PIV SL  TELEMETRY RHYTHM: NA  SKIN: Rash/redness in Abd. folds and under L breast, scattered scabs/bruising, calf folds - cleansed, dried and miconazole powder applied. Thoracentesis site R back- bandaid CDI  TESTS/PROCEDURES: None this shift  D/C DATE: Pending improvement    OTHER IMPORTANT INFO: LS:diminished, frequent productive cough, scheduled robitussin given x2 which helped as per pt.Thoracentesis 4/17/25

## 2025-04-19 ENCOUNTER — APPOINTMENT (OUTPATIENT)
Dept: PHYSICAL THERAPY | Facility: CLINIC | Age: 65
DRG: 187 | End: 2025-04-19
Attending: HOSPITALIST
Payer: COMMERCIAL

## 2025-04-19 ENCOUNTER — APPOINTMENT (OUTPATIENT)
Dept: OCCUPATIONAL THERAPY | Facility: CLINIC | Age: 65
DRG: 187 | End: 2025-04-19
Attending: HOSPITALIST
Payer: COMMERCIAL

## 2025-04-19 VITALS
RESPIRATION RATE: 16 BRPM | SYSTOLIC BLOOD PRESSURE: 115 MMHG | HEART RATE: 83 BPM | TEMPERATURE: 97.4 F | WEIGHT: 293 LBS | OXYGEN SATURATION: 95 % | BODY MASS INDEX: 48.18 KG/M2 | DIASTOLIC BLOOD PRESSURE: 68 MMHG

## 2025-04-19 LAB
ANION GAP SERPL CALCULATED.3IONS-SCNC: 11 MMOL/L (ref 7–15)
BUN SERPL-MCNC: 52.7 MG/DL (ref 8–23)
CALCIUM SERPL-MCNC: 9.1 MG/DL (ref 8.8–10.4)
CHLORIDE SERPL-SCNC: 103 MMOL/L (ref 98–107)
CREAT SERPL-MCNC: 1.72 MG/DL (ref 0.51–0.95)
EGFRCR SERPLBLD CKD-EPI 2021: 33 ML/MIN/1.73M2
GLUCOSE SERPL-MCNC: 102 MG/DL (ref 70–99)
HCO3 SERPL-SCNC: 22 MMOL/L (ref 22–29)
HGB BLD-MCNC: 11.4 G/DL (ref 11.7–15.7)
INR PPP: 1.96 (ref 0.85–1.15)
POTASSIUM SERPL-SCNC: 4.5 MMOL/L (ref 3.4–5.3)
SODIUM SERPL-SCNC: 136 MMOL/L (ref 135–145)

## 2025-04-19 PROCEDURE — 99239 HOSP IP/OBS DSCHRG MGMT >30: CPT | Performed by: HOSPITALIST

## 2025-04-19 PROCEDURE — 250N000013 HC RX MED GY IP 250 OP 250 PS 637: Performed by: HOSPITALIST

## 2025-04-19 PROCEDURE — 85610 PROTHROMBIN TIME: CPT | Performed by: HOSPITALIST

## 2025-04-19 PROCEDURE — 97165 OT EVAL LOW COMPLEX 30 MIN: CPT | Mod: GO

## 2025-04-19 PROCEDURE — 36415 COLL VENOUS BLD VENIPUNCTURE: CPT | Performed by: HOSPITALIST

## 2025-04-19 PROCEDURE — 97161 PT EVAL LOW COMPLEX 20 MIN: CPT | Mod: GP

## 2025-04-19 PROCEDURE — 82310 ASSAY OF CALCIUM: CPT | Performed by: HOSPITALIST

## 2025-04-19 PROCEDURE — 97535 SELF CARE MNGMENT TRAINING: CPT | Mod: GO

## 2025-04-19 PROCEDURE — 80048 BASIC METABOLIC PNL TOTAL CA: CPT | Performed by: HOSPITALIST

## 2025-04-19 PROCEDURE — 85018 HEMOGLOBIN: CPT | Performed by: HOSPITALIST

## 2025-04-19 PROCEDURE — 97530 THERAPEUTIC ACTIVITIES: CPT | Mod: GP

## 2025-04-19 RX ORDER — LISINOPRIL 5 MG/1
5 TABLET ORAL DAILY
Status: SHIPPED
Start: 2025-04-19

## 2025-04-19 RX ORDER — GUAIFENESIN/DEXTROMETHORPHAN 100-10MG/5
10 SYRUP ORAL 4 TIMES DAILY PRN
Qty: 236 ML | Refills: 0 | Status: SHIPPED | OUTPATIENT
Start: 2025-04-19

## 2025-04-19 RX ORDER — FUROSEMIDE 20 MG/1
40 TABLET ORAL DAILY PRN
Status: SHIPPED
Start: 2025-04-19

## 2025-04-19 RX ADMIN — MICONAZOLE NITRATE: 2 POWDER TOPICAL at 08:16

## 2025-04-19 RX ADMIN — GUAIFENESIN AND DEXTROMETHORPHAN 10 ML: 100; 10 SYRUP ORAL at 05:49

## 2025-04-19 RX ADMIN — GUAIFENESIN AND DEXTROMETHORPHAN 10 ML: 100; 10 SYRUP ORAL at 00:00

## 2025-04-19 RX ADMIN — ARIPIPRAZOLE 20 MG: 10 TABLET ORAL at 08:15

## 2025-04-19 RX ADMIN — FUROSEMIDE 40 MG: 40 TABLET ORAL at 08:15

## 2025-04-19 RX ADMIN — HYDROXYCHLOROQUINE SULFATE 200 MG: 200 TABLET ORAL at 08:14

## 2025-04-19 RX ADMIN — WARFARIN SODIUM 13 MG: 10 TABLET ORAL at 12:20

## 2025-04-19 RX ADMIN — GUAIFENESIN AND DEXTROMETHORPHAN 10 ML: 100; 10 SYRUP ORAL at 12:20

## 2025-04-19 RX ADMIN — VENLAFAXINE HYDROCHLORIDE 150 MG: 150 CAPSULE, EXTENDED RELEASE ORAL at 08:15

## 2025-04-19 ASSESSMENT — ACTIVITIES OF DAILY LIVING (ADL)
ADLS_ACUITY_SCORE: 45

## 2025-04-19 NOTE — PROGRESS NOTES
04/19/25 1209   Appointment Info   Signing Clinician's Name / Credentials (OT) PARTH Glass   Student Supervision Line of sight supervision provided   Living Environment   People in Home alone   Current Living Arrangements apartment   Home Accessibility no concerns   Transportation Anticipated family or friend will provide   Living Environment Comments Pt lives in an apt alone, uses shower chair, no grab bars but has a raised toilet seat with frame. Pt usually uses 4WW when out of apt but sometimes sits on 4WW seat at home if feeling SOB   Self-Care   Usual Activity Tolerance moderate   Current Activity Tolerance fair   Equipment Currently Used at Home dressing device;raised toilet seat;shower chair;walker, rolling   Fall history within last six months no   Activity/Exercise/Self-Care Comment Pt typically IND with toileting, dressing, uses sock aid, HH helps her get into shower but pt is IND with washing   Instrumental Activities of Daily Living (IADL)   IADL Comments Pt has help with most IADLs, is IND with meds (DEP with driving, cooking, cleaning, laundry)   General Information   Onset of Illness/Injury or Date of Surgery 04/15/25   Referring Physician Naga Martinez MD   Additional Occupational Profile Info/Pertinent History of Current Problem Shira Rodriguez is a 64 year old female with PMH significant for morbid obesity, rheumatoid arthritis, h/o massive PE leading to cardiac arrest (2019), hypertension, b/l pleural effusion, reactive airway disease/chronic bronchitis, CYRUS who presented to ED for evaluation of dyspnea on exertion ongoing for about two weeks, admitted inpatient 4/15/25.   Existing Precautions/Restrictions fall   Cognitive Status Examination   Orientation Status orientation to person, place and time   Cognitive Status Comments no concerns   Visual Perception   Visual Impairment/Limitations WFL   Pain Assessment   Patient Currently in Pain Yes, see Vital Sign flowsheet    Range of Motion Comprehensive   Comment, General Range of Motion Pt ROM in BLE and BUE limited by arthritis   Strength Comprehensive (MMT)   Comment, General Manual Muscle Testing (MMT) Assessment generalized weakness d/t deconditioning   Transfers   Transfers sit-stand transfer   Sit-Stand Transfer   Sit-Stand [place] (Transfers) supervision   Assistive Device (Sit-Stand Transfers) walker, standard   Balance   Balance Comments SBA for balance   Activities of Daily Living   BADL Assessment/Intervention upper body dressing;lower body dressing;toileting;bathing;grooming   Bathing Assessment/Intervention   [place] Level (Bathing) supervision   Comment, (Bathing) per clinical judgement/pt report   Assistive Devices (Bathing) shower chair   Upper Body Dressing Assessment/Training   [place] Level (Upper Body Dressing) modified independence   Lower Body Dressing Assessment/Training   Comment, (Lower Body Dressing) uses sock aid at home; helped with donning sock in lieu of sock aid in eval   [place] Level (Lower Body Dressing) supervision   Grooming Assessment/Training   [place] Level (Grooming) modified independence   Toileting   Assistive Devices (Toileting) raised toilet seat;grab bar, toilet   Comment, (Toileting) per clinical judgement/pt report   [place] Level (Toileting) modified independence   Clinical Impression   Criteria for Skilled Therapeutic Interventions Met (OT) Yes, treatment indicated   OT Diagnosis decreased IND with ADLs   OT Problem List-Impairments impacting ADL problems related to;activity tolerance impaired;mobility;range of motion (ROM);strength;pain   Assessment of Occupational Performance 3-5 Performance Deficits   Identified Performance Deficits bathing, dressing, act tolerance, mobility   Planned Therapy Interventions (OT) ADL retraining;ROM;strengthening;progressive activity/exercise;risk factor education   Clinical Decision Making Complexity (OT) problem focused  assessment/low complexity   Risk & Benefits of therapy have been explained evaluation/treatment results reviewed;care plan/treatment goals reviewed;risks/benefits reviewed;participants included;patient   OT Total Evaluation Time   OT Eval, Low Complexity Minutes (96345) 5   OT Goals   Therapy Frequency (OT) One time eval and treatment   OT Predicted Duration/Target Date for Goal Attainment 04/19/25   OT Goals Hygiene/Grooming;Upper Body Dressing;Lower Body Dressing;Upper Body Bathing;Lower Body Bathing;Toilet Transfer/Toileting;Transfers   OT: Hygiene/Grooming modified independent;Goal Met   OT: Upper Body Dressing Modified independent;Goal Met   OT: Lower Body Dressing Supervision/stand-by assist;Goal Met;using adaptive equipment   OT: Upper Body Bathing Supervision/stand-by assist;Goal Met;using adaptive equipment   OT: Lower Body Bathing Supervision/stand-by assist;Goal Met;using adaptive equipment   OT: Transfer Supervision/stand-by assist;Goal Met   OT: Toilet Transfer/Toileting Modified independent;Goal Met;using adaptive equipment   Interventions   Interventions Quick Adds Self-Care/Home Management   Self-Care/Home Management   Self-Care/Home Mgmt/ADL, Compensatory, Meal Prep Minutes (28796) 24   Symptoms Noted During/After Treatment (Meal Preparation/Planning Training) increased pain;increase work of breath   Treatment Detail/Skilled Intervention Pt greeted sitting in chair, agreeable to OT. Gathered eval info. Pt donned underwear, pants with SBA, doffed socks with SBA, mod A for donning socks (pt uses sock aid at home; modA provided in lieu of AE). Pt sit<>stand (SBA, FWW) to pull up garments, able to bend to ankles to gather garments and pull them up (SBA). Pt donned deoderant with mod I. Pt stand<>sit with SBA, mod I for UB dressing. Pt reporting that she ambulated to bathroom, used bathroom and did standing g/h task without FWW prior to OT session. Discussed EC/WC strategies with pt, made personalized  recommendations, provided handout. Pt receptive. Pt reporting that HHOT/PT was ordered and scheduled to start even before hospitalization. Pt reporting no further questions for OTS. Left pt sitting in chair, all needs met.   OT Discharge Planning   OT Plan d/c OT   OT Discharge Recommendation (DC Rec) home with home care occupational therapy;home with assist   OT Rationale for DC Rec Pt is below baseline following pleural effusions and deconditioning affecting her IND with ADLs. Pt performing ADLs/mobility with SBA today, reports having HH perform most IADLs for her, [place] with bathing, and impending start of HHOT/PT. Recommend d/c home with assist for IADLs, bathing, LB dressing, and HHOT/PT.   OT Brief overview of current status Goals of therapy will be to address safe mobility and make recs for d/c to next level of care. Pt and RN will continue to follow all falls risk precautions as documented by RN staff while hospitalized.   OT Total Distance Amb During Session (feet) 2   Total Session Time   Timed Code Treatment Minutes 24   Total Session Time (sum of timed and untimed services) 29

## 2025-04-19 NOTE — PROGRESS NOTES
Patient seen and examined    -Evaluated by PT, rec home with home care; to resume home cares  -no acute issues overnight, respiratory status stable on room air  -reports of some blood tinged productive sputum, no yonathan hemoptysis; Hb stable 11.2---11.4  -pleural fluid culture with no growth so far, cytology pending  -renal function slightly worsening with Cr up to 1.72 and BUN trending as well to 52; clinically appears euvolumic; will hold further doses of Lasix and suggested to take only as needed for leg swelling or weight gain >2 lbs in 24 hrs  -will also hold lisinopril until repeat BMP with PCP follow-up  -she has a virtual visit with pulmonology on 4/21/25    Discharge home today    Please see discharge summary for details

## 2025-04-19 NOTE — PLAN OF CARE
Goal Outcome Evaluation:      SUMMARY: Dyspnea on exertion, Acute CHF, [place] pleural effusion    DATE & TIME: 4/18/25-4/19/25 [id number]    Cognitive Concerns/ Orientation: A & O x4   BEHAVIOR & AGGRESSION TOOL COLOR: Green  ABNL VS/O2: VSS on RA-soft BP   MOBILITY: Assist x1 GB/W-not OOB this shift  PAIN MANAGMENT: Denies pain   DIET: Regular- Strict I&O  BOWEL/BLADDER: incontinent of bladder at times; Purewick in place while in bed, can be continent of bowel  ABNL LAB/BG:  INR 2.42  DRAIN/DEVICES: L PIV SL  TELEMETRY RHYTHM: NA  SKIN: Rash/redness in Abd. folds and under L breast, scattered scabs/bruising, calf folds - cleansed, dried and miconazole powder applied. Thoracentesis site R back- bandaid CDI  TESTS/PROCEDURES: None this shift  D/C DATE: Pending improvement    OTHER IMPORTANT INFO: LS:diminished, frequent productive cough, scheduled robitussin given x2 which helped as per pt.Thoracentesis 4/17/25

## 2025-04-19 NOTE — DISCHARGE SUMMARY
[facility]  Discharge Summary        Shira Rodriguez MRN# [id number]   YOB: 1960 Age: 64 year old     Date of Admission:  4/15/2025  Date of Discharge:  4/19/2025  Admitting Physician:  Naga Martinez MD  Discharge Physician: Naga Martinez MD  Discharging Service: Hospitalist     Primary Provider: Gaby Hernandez  Primary Care Physician Phone Number: [phone]         Discharge Diagnoses/Problem Oriented Hospital Course (Providers):    Shira Rodriguez was admitted on 4/15/2025 by Naga Martinez MD and I would refer you to their history and physical.  The following problems were addressed during her hospitalization:    Shira Rodriguez is a 64 year old female with PMH significant for morbid obesity, rheumatoid arthritis, h/o massive PE leading to cardiac arrest (2019), hypertension, b/l pleural effusion, reactive airway disease/chronic bronchitis, CYRUS who presented to ED for evaluation of dyspnea on exertion ongoing for about two weeks, admitted inpatient 4/15/25.     She was seen in clinic on 4/8/25 for dyspnea on exertion. Chest imaging at the time was noted with bilateral pleural effusion along with lung edema and elevated BNP. She used to weigh 286 pounds and in clinic she was noted to weigh 318 pounds . With concern for decompensated CHF , she was started on 20 mg PO Lasix. She had follow-up virtual visit on the day of presentation, noted with no significant clinical improvement and was sent to ED. she had been scheduled for outpatient thoracentesis on 4/23/25.     Dyspnea on exertion likely due to pleural effusion  Probable acute CHF preserved EF, considered but less likely   Bilateral pleural effusion (right>left), s/p thoracentesis (4/17/25)  Hypertension  Moderate aortic stenosis (ECHO 4/2025)  Chronic bronchitis  Morbid obesity, sleep apnea  - initial presentation and recent evaluation in clinic as noted above  - was recently started on Lasix 20 mg  daily PTA with no significant clinical improvement  - BNP elevated to 2254, Trop T elevated 15---17, normal TSH, INR therapeutic 3.04  - her dyspnea on exertion could be multifactorial due to CHF, pleural effusion, chronic bronchitis  - reports significant weight gain but clinically does not look significantly fluid overloaded  - ECHO 4/16 noted normal LVEF 55%, moderate calcific aortic valve stenosis (area by Doppler is 1.4 cmÂ ), Mild aortic regurgitation. Trivial mitral regurgitation. No significant change compared to prior exam from 1/30/2023.; Diastolic function indeterminate     - Received 60 mg IV Lasix in ED and was started on IV Lasix  - INR therapeutic on presentation, less likely acute PE; respiratory status stable on room air  - reports frequent productive cough  - INR trended down--2.42 and had US guided thoracentesis (4/17) with removal of 1100 ml dark bloody red fluid; fluid culture with no growth so far and cytology pending  -reports of some blood tinged productive sputum, no yonathan hemoptysis; Hb stable 11.2---11.4  -renal function slightly worsening with Cr up to 1.72 and BUN trending as well to 52; clinically appears euvolumic; symptoms less likely due to CHF and probably more due to bronchitis and pleural effusion; will hold further doses of Lasix and suggested to take only as needed for leg swelling or weight gain >2 lbs in 24 hrs  -will also hold lisinopril until repeat BMP with PCP follow-up  -she has a virtual visit with pulmonology on 4/21/25  - an outpatient cardiology referral has already been made by her clinic provider  - will continue with PTA inhalers     H/o cardiac arrest due to massive pulmonary embolism (2019)  -INR therapeutic on admission; warfarin was held for thoracentesis  -pharmacy to dose Coumadin (4/17)     Anxiety/depression  -continue PTA Abilify, Effexor      CKD stage III  -creatinine 1.46---1.59--1.66--- 1.69---1.72, baseline of 1.4 to 1.6  -monitor BMP with PCP  "follow-up  -plan to hold lisinopril 4/18 as noted above; also Lasix changed to PRN as above     Likely candida intertrigo  -she has significant rash under abdominal fold  -she was admitted in 2023 with severe sepsis and neutropenia likely due to panniculitis  -miconazole powder PRN  - WOC consulted     Rheumatoid arthritis  - continue PTA Plaquenil      Code status: full code     Disposition:   Medically Ready for Discharge:   -evaluated by PT; plan for discharge home with resumption of home cares    Clinically Significant Risk Factors               # Hypoalbuminemia: Lowest albumin = 3.3 g/dL at 4/15/2025  5:49 PM, will monitor as appropriate          # Chronic heart failure with preserved ejection fraction: heart failure noted on problem list and last echo with EF >50%                 # Severe Obesity: Estimated body mass index is 48.18 kg/m  as calculated from the following:    Height as of 4/8/25: 1.676 m (5' 6\").    Weight as of this encounter: 135.4 kg (298 lb 8.1 oz).  , PRESENT ON ADMISSION       # Financial/Environmental Concerns: none                        Brief Hospital Stay Summary Sent Home With Patient in AVS:        Reason for your hospital stay      You were admitted for evaluation of shortness of breath on exertion. You   had thoracentesis and pleural fluid removed.                Pending Results:        Unresulted Labs Ordered in the Past 30 Days of this Admission       Date and Time Order Name Status Description    4/15/2025 11:18 PM Cytology, non-gynecologic In process     4/15/2025 11:18 PM Pleural fluid Aerobic Bacterial Culture Routine With Gram Stain Preliminary               Discharge Instructions and Follow-Up:      Follow-up Appointments     Follow Up      Keep up your appointment with Pulmonologist.  Follow up with Cardiology (outpatient referral made previously).  Repeat INR in 2 days and results to PCP or Coumadin clinic for further   Coumadin dose adjustment.        Hospital Follow-up " with Existing Primary Care Provider (PCP)          Schedule Primary Care visit within: 7 Days   Recommended labs and Imaging (to be ordered by Primary Care Provider):   Repeat CBC and BMP               Discharge Disposition:      Discharged to home        Discharge Medications:        Current Discharge Medication List        START taking these medications    Details   guaiFENesin-dextromethorphan (ROBITUSSIN DM) 100-10 MG/5ML syrup Take 10 mLs by mouth 4 times daily as needed for cough.  Qty: 236 mL, Refills: 0    Associated Diagnoses: Mucopurulent chronic bronchitis (H)      miconazole (MICATIN) 2 % external powder Apply topically 2 times daily. Apply to rash under abdominal folds.  Qty: 43 g, Refills: 0    Associated Diagnoses: Rash           CONTINUE these medications which have CHANGED    Details   furosemide (LASIX) 20 MG tablet Take 2 tablets (40 mg) by mouth daily as needed (for leg edema or weight gain >2 lbs in 24 hours).    Associated Diagnoses: Dyspnea, unspecified type      lisinopril (ZESTRIL) 5 MG tablet Take 1 tablet (5 mg) by mouth daily. (Hold until repeat BMP with PCP follow up)    Associated Diagnoses: Cardiac arrest (H)           CONTINUE these medications which have NOT CHANGED    Details   acetaminophen (TYLENOL) 325 MG tablet Take 2 tablets (650 mg) by mouth every 6 hours as needed for mild pain or fever    Associated Diagnoses: Osteoarthritis of right knee, unspecified osteoarthritis type      albuterol (PROAIR HFA/PROVENTIL HFA/VENTOLIN HFA) 108 (90 Base) MCG/ACT inhaler Inhale 2 puffs into the lungs every 6 hours as needed for shortness of breath, wheezing or cough.  Qty: 18 g, Refills: 3    Comments: Pharmacy may dispense brand covered by insurance (Proair, or proventil or ventolin or generic albuterol inhaler)  Associated Diagnoses: Exertional shortness of breath      ARIPiprazole (ABILIFY) 20 MG tablet Take 20 mg by mouth daily.      guaiFENesin (MUCINEX) 600 MG 12 hr tablet Take 1  tablet (600 mg) by mouth 2 times daily as needed for congestion or cough.  Qty: 30 tablet, Refills: 0    Associated Diagnoses: Exertional shortness of breath      hydroxychloroquine (PLAQUENIL) 200 MG tablet TAKE 1 TABLET(200 MG) BY MOUTH TWICE DAILY  Qty: 60 tablet, Refills: 7    Associated Diagnoses: Rheumatoid arthritis with negative rheumatoid factor, involving unspecified site (H)      multivitamin w/minerals (THERA-VIT-M) tablet Take 1 tablet by mouth daily  Qty: 100 tablet, Refills: 1    Associated Diagnoses: Vitamin deficiency      nystatin (MYCOSTATIN) 570542 UNIT/GM external powder Apply topically 4 times daily as needed  Qty: 60 g, Refills: 1    Associated Diagnoses: Rash      senna-docusate (SENOKOT-S/PERICOLACE) 8.6-50 MG tablet Take 1 tablet by mouth 2 times daily as needed for constipation  Qty: 180 tablet, Refills: 1    Associated Diagnoses: Constipation, unspecified constipation type      venlafaxine (EFFEXOR XR) 150 MG 24 hr capsule Take 150 mg by mouth daily.      VITAMIN D3 25 MCG (1000 UT) tablet Take 50 mcg by mouth daily.      warfarin ANTICOAGULANT (COUMADIN) 5 MG tablet Take 2 tablets (10mg) daily or as directed by Anticoagulation Clinic-adjusted per INR results  Qty: 180 tablet, Refills: 1    Associated Diagnoses: Chronic pulmonary embolism, unspecified pulmonary embolism type, unspecified whether acute cor pulmonale present (H)      Respiratory Therapy Supplies (Queryly) YASMANI 1 each 3 times daily.  Qty: 1 each, Refills: 2    Associated Diagnoses: Exertional shortness of breath               Allergies:         Allergies   Allergen Reactions    Adhesive Tape Blisters    Erythromycin Rash           Consultations This Hospital Stay:      No consultations were requested during this admission        Condition and Physical on Discharge:        Discharge condition: Stable   Vitals: Blood pressure 115/68, pulse 83, temperature 97.4  F (36.3  C), temperature source Oral, resp. rate 16, weight 135.4  kg (298 lb 8.1 oz), SpO2 95%, not currently breastfeeding.     Constitutional: Alert, awake and oriented X 3; lying comfortably in bed in no apparent distress; morbidly obese               Cardiovascular: Normal s1 s2, regular rate and rhythm, no murmur   Lungs: improved aeration bilaterally ; no significant wheezes or crepitations   Abdomen: Soft, nt, no guarding, rigidity or rebound; BS +; morbidly obese   LE : No edema   Musculoskeletal: Power 5/5 in all extremities   Neuro: No focal neurological deficits noted, CN II to XII grossly intact   Psychiatry: normal mood and affect             Discharge Time:      Greater than 30 minutes.        Image Results From This Hospital Stay (For Non-EPIC Providers):        Results for orders placed or performed during the hospital encounter of 04/15/25   XR Chest 2 Views    Narrative    EXAM: XR CHEST 2 VIEWS  LOCATION: [facility]  DATE: 4/15/2025    INDICATION: Exertional dyspnea.  COMPARISON: 4/8/2025.      Impression    IMPRESSION: Unchanged moderate to large right pleural effusion. Mild pulmonary interstitial edema. Unchanged small left pleural effusion. No definitive airspace consolidation. No pneumothorax.    Unchanged mild cardiomegaly. Atherosclerotic calcifications of the thoracoabdominal aorta.   US Thoracentesis    Narrative    EXAM:   1. RIGHT THORACENTESIS  2. ULTRASOUND GUIDANCE  LOCATION: [facility]  DATE: 4/17/2025    INDICATION: Pleural effusion.    PROCEDURE: Informed consent obtained. Time out performed. The chest was prepped and draped in sterile fashion. 10 mL of 1 % lidocaine was infused into the local soft tissues. Under direct ultrasound guidance, a 5 Belgian catheter system was placed into   the pleural effusion.     1.1 liters of dark, bloody fluid were removed and sent to lab, if requested.    Patient tolerated procedure well.    Ultrasound imaging was obtained and placed in the patient's permanent  medical record.      Impression    IMPRESSION:  Status post right ultrasound-guided thoracentesis.     Echocardiogram Complete     Value    LVEF  55%    Narrative    [id number]  [id number]  [id number]  [id number]^CAESAR^MEGHAN^CHELSEA     [facility]  Echocardiography Laboratory  [address] [place] [id number]     Name: XAVIER ROWELL  MRN: [id number]  : 1960  Study Date: 2025 02:09 PM  Age: 64 yrs  Gender: Female  Patient Location: [facility]  Reason For Study: SANTOS  Ordering Physician: MEGHAN MAYNRAD  Referring Physician: MEGHAN MAYNARD  Performed By: Marva Stovall     BSA: 2.4 m2  Height: 66 in  Weight: 314 lb  HR: 70  BP: 102/64 mmHg  ______________________________________________________________________________  Procedure  Echocardiogram with two-dimensional, color and spectral Doppler. Definity (NDC  #76531-617) given intravenously. Technically difficult study.  ______________________________________________________________________________  Interpretation Summary     1. Normal left ventricular size and systolic function per estimated ejection  fraction is 55%.  2. Right ventricle not well visualized. Grossly normal right ventricular size  and systolic function.  3. Moderate calcific aortic valve stenosis. Aortic valve mean systolic  gradient is 26 mmHg. Aortic valve area by Doppler is 1.4 cmÂ . Mild aortic  regurgitation.  4. Moderately calcified mitral annulus. Trivial mitral regurgitation.  5. Normal ascending aorta dimension.  6. No significant change compared to prior exam from 2023.  ______________________________________________________________________________  Left Ventricle  The left ventricle is normal in size. There is normal left ventricular wall  thickness. Left ventricular diastolic function is indeterminate. Left  ventricular systolic function is normal. The visual ejection fraction is  estimated at 55%. No regional wall motion abnormalities noted.      Right Ventricle  The right ventricle is normal in size and function. The right ventricle is not  well visualized.     Atria  The left atrium is mildly dilated. Right atrial size is normal. There is no  color Doppler evidence of an atrial shunt.     Mitral Valve  There is moderate mitral annular calcification. The mitral valve leaflets are  mildly thickened. There is trace mitral regurgitation.     Tricuspid Valve  The tricuspid valve is not well visualized. There is trace tricuspid  regurgitation.     Aortic Valve  The aortic valve is not well visualized. There appears to be partial fusion of  right-left cusps. Moderate calcific aortic valve stenosis. Aortic valve mean  systolic gradient is 26 mmHg. Aortic valve area by Doppler is 1.4 cmÂ . Mild  aortic regurgitation.     Pulmonic Valve  The pulmonic valve is not well visualized. There is trace pulmonic valvular  regurgitation. Normal pulmonic valve velocity.     Vessels  The aortic root is normal size. Normal size ascending aorta. The inferior vena  cava was normal in size with preserved respiratory variability.     Pericardium  There is no pericardial effusion.     Rhythm  Sinus rhythm was noted.  ______________________________________________________________________________  MMode/2D Measurements & Calculations  IVSd: 1.2 cm     LVIDd: 5.3 cm  LVIDs: 3.4 cm  LVPWd: 1.2 cm  FS: 35.8 %  LV mass(C)d: 256.6 grams  LV mass(C)dI: 105.9 grams/m2  Ao root diam: 3.5 cm  asc Aorta Diam: 3.2 cm  LVOT diam: 2.4 cm  LVOT area: 4.5 cm2  Ao root diam index Ht(cm/m): 2.1  Ao root diam index BSA (cm/m2): 1.4  Asc Ao diam index BSA (cm/m2): 1.3  Asc Ao diam index Ht(cm/m): 1.9  LA Volume (BP): 90.4 ml     LA Volume Index (BP): 37.4 ml/m2  RV Base: 4.1 cm  RWT: 0.45  TAPSE: 1.6 cm     Doppler Measurements & Calculations  MV E max chris: 102.0 cm/sec  MV A max chris: 133.0 cm/sec  MV E/A: 0.77  MV dec slope: 504.0 cm/sec2  MV dec time: 0.20 sec  Ao V2 max: 309.0 cm/sec  Ao max PG:  38.0 mmHg  Ao V2 mean: 242.0 cm/sec  Ao mean P.0 mmHg  Ao V2 VTI: 67.8 cm  MERON(I,D): 1.4 cm2  MERON(V,D): 1.7 cm2  LV V1 max P.6 mmHg  LV V1 max: 118.6 cm/sec  LV V1 VTI: 20.8 cm  SV(LVOT): 94.2 ml  SI(LVOT): 38.9 ml/m2  PA acc time: 0.10 sec  AV Emile Ratio (DI): 0.38  MERON Index (cm2/m2): 0.57  E/E' av.3     Lateral E/e': 5.0  Medial E/e': 7.6  RV S Emile: 12.0 cm/sec     ______________________________________________________________________________  Report approved by: Juan Blair MD on 2025 03:40 PM           *Note: Due to a large number of results and/or encounters for the requested time period, some results have not been displayed. A complete set of results can be found in Results Review.           Most Recent Lab Results In EPIC (For Non-EPIC Providers):    Most Recent 3 CBC's:  Recent Labs   Lab Test 25  0653 25  0628 04/15/25  1749 25  1353 24  1458   WBC  --   --  7.0 7.1 5.2   HGB 11.4* 11.2* 11.9 13.1 12.1   MCV  --   --  91 93 93   PLT  --   --  165 249 224      Most Recent 3 BMP's:  Recent Labs   Lab Test 25  0653 25  0628 25  0642    136 141   POTASSIUM 4.5 4.4 4.3   CHLORIDE 103 102 104   CO2 22 22 25   BUN 52.7* 47.4* 36.4*   CR 1.72* 1.69* 1.66*   ANIONGAP 11 12 12   MARIA INES 9.1 8.8 9.0   * 98 106*     Most Recent 3 Troponin's:  Recent Labs   Lab Test 19  0300 19  0324 19  2032   TROPI <0.015 <0.015 <0.015     Most Recent 3 INR's:  Recent Labs   Lab Test 25  0653 25  0628 25  0642   INR 1.96* 2.00* 2.42*     Most Recent 2 LFT's:  Recent Labs   Lab Test 04/15/25  1749 24  1458   AST 25 21   ALT 12 20   ALKPHOS 65 74   BILITOTAL 0.2 0.2     Most Recent Cholesterol Panel:  Recent Labs   Lab Test 24  1458   CHOL 226*   *   HDL 78   TRIG 89     Most Recent 6 Bacteria Isolates From Any Culture (See EPIC Reports for Culture Details):  Recent Labs   Lab Test 20  1216 10/04/19  1400  09/20/19  1414 09/20/19  1404 09/20/19  1238 09/17/19  2242   CULT No growth No growth No growth No growth Moderate growth  Serratia marcescens  * No growth     Most Recent TSH, T4 and HgbA1c:   Recent Labs   Lab Test 04/15/25  1749 07/11/24  1458   TSH 2.00 1.76   T4  --  1.20   A1C  --  5.0

## 2025-04-19 NOTE — PLAN OF CARE
Goal Outcome Evaluation:      Summary: Dyspnea on exertion, Acute CHF, [place] pleural effusion    Date & Time: 4/18/25 4519-1225   Orientation: A&O x4   Activity Level: A1 GBW    Fall Risk: Yes   Behavior & Aggression: Green   Pain Management: Denies   ABNL VS/O2: VSS on RA   Tele: N/A   ABNL Lab/BG: Hgb 11.2   Diet: Regular    Bowel/Bladder: Continent, had a very large BM tonight     Skin: Rash/redness in Abd. folds and under L breast, scattered scabs/bruising, calf folds - cleansed, dried and miconazole powder applied. Thoracentesis site R back- bandaid CDI  Drains/Devices:  L PIV SL  Tests/Procedures: None    Anticipated DC Date: Pending   Other Important Info:  Thoracentesis done  4/17/25

## 2025-04-19 NOTE — PROGRESS NOTES
Pt A&O x4. Verbalized understanding of discharge instructions. PIV removed and on room air. Pt sent home with all personal belongs.

## 2025-04-19 NOTE — PROGRESS NOTES
"   04/19/25 0836   Appointment Info   Signing Clinician's Name / Credentials (PT) Mouna Morin, DPSYLVAIN   Living Environment   People in Home alone   Current Living Arrangements apartment   Transportation Anticipated family or friend will provide   Living Environment Comments Pt reports no concerns for home accessibility. Reports she lives in a typical apartment (no snf), the unit she reports is fairly small and easy to get around. The building has a fairly long hallway to get to her unit however she feels comfortable sitting down on her 4WW if needed. No grab bars, does have a shower chair. No stairs. Family lives less than 5 minuts away. Of note, her sister wants her to stay at her place if she comes home tonight. Her sister's place does not have stairs. Pt would prefer to be in her own home.   Self-Care   Usual Activity Tolerance moderate   Current Activity Tolerance fair   Equipment Currently Used at Home none;shower chair;walker, rolling   Fall history within last six months no   Activity/Exercise/Self-Care Comment Ind. with all ADLs and iADLs, typically does not use AD within the home but uses 4WW in community   General Information   Onset of Illness/Injury or Date of Surgery 04/15/25   Referring Physician Naga Martinez MD   Patient/Family Therapy Goals Statement (PT) improve mobility   Pertinent History of Current Problem (include personal factors and/or comorbidities that impact the POC) Per chart review: \"64 year old female with PMH significant for morbid obesity, rheumatoid arthritis, h/o massive PE leading to cardiac arrest (2019), hypertension, b/l pleural effusion, reactive airway disease/chronic bronchitis, CYRUS who presented to ED for evaluation of dyspnea on exertion ongoing for about two weeks, admitted inpatient 4/15/25.     She was seen in clinic on 4/8/25 for dyspnea on exertion. Chest imaging at the time was noted with bilateral pleural effusion along with lung edema and elevated BNP. " "She used to weigh 286 pounds and in clinic she was noted to weigh 318 pounds . With concern for decompensated CHF , she was started on 20 mg PO Lasix. She had follow-up virtual visit on the day of presentation, noted with no significant clinical improvement and was sent to ED\"   General Observations Pt agreeable to PT.   Cognition   Affect/Mental Status (Cognition) WFL   Orientation Status (Cognition) oriented x 4   Follows Commands (Cognition) follows multi-step commands   Cognitive Status Comments No concerns   Pain Assessment   Patient Currently in Pain No   Posture    Posture Forward head position;Protracted shoulders   Range of Motion (ROM)   ROM Comment WFL   Strength (Manual Muscle Testing)   Strength Comments global anti-gravity with functional movement, global weakness   Transfers   Transfers sit-stand transfer   Comment, (Transfers) STS with FWW, SBA   Gait/Stairs (Locomotion)   Comment, (Gait/Stairs) 5' FWW with CGA   Balance   Balance Comments Mild unsteadiness when fatiguing during ambulation   Clinical Impression   Criteria for Skilled Therapeutic Intervention Yes, treatment indicated   PT Diagnosis (PT) impaired mobility   Influenced by the following impairments activity tolerance, weakness   Functional limitations due to impairments ambulation, transfers, balance   Clinical Presentation (PT Evaluation Complexity) stable   Clinical Presentation Rationale clinical judgment   Clinical Decision Making (Complexity) low complexity   Planned Therapy Interventions (PT) balance training;gait training;home exercise program;neuromuscular re-education;patient/family education;postural re-education;strengthening;transfer training;progressive activity/exercise;risk factor education;home program guidelines   Risk & Benefits of therapy have been explained evaluation/treatment results reviewed;care plan/treatment goals reviewed;risks/benefits reviewed;participants included;patient   Clinical Impression Comments " Mainly limited by SOB with longer ambulation   PT Total Evaluation Time   PT Eval, Low Complexity Minutes (98691) 10   Physical Therapy Goals   PT Frequency 5x/week   PT Predicted Duration/Target Date for Goal Attainment 04/22/25   PT Goals Gait;Transfers;Aerobic Activity   PT: Transfers Modified independent;Sit to/from stand   PT: Gait Modified independent;Rolling walker;Greater than 200 feet   PT: Perform aerobic activity with stable cardiovascular response 5 minutes;continuous activity;ambulation   Interventions   Interventions Quick Adds Therapeutic Activity   Therapeutic Activity   Therapeutic Activities: dynamic activities to improve functional performance Minutes (81490) 15   Treatment Detail/Skilled Intervention Pt in chair upon arrival without alarm on. Therapist greeted pt and explained role of physical therapy in the hospital setting. Pt STS with FWW and SBA, ambulated in hallway 135' with FWW, progressing from CGA to SBA (writer brought along w/c as pt reports SOB with longer distances). Pt demonstrating SOB and slowed pace, pausing with fatigue 2' from her chair. Writer asks pt if she is alright and if she can make it to the chair. spO2 down to 88%, recovering to 95% within 2 minutes, cueing for pursed lip breathing. Writer discussed current PT recommendation for pt and pt agrees. Call light within reach and needs met at end of session.   PT Discharge Planning   PT Plan bring pulse-ox for longer ambulation & possibly w/c (Ax1), progress activity tolerance, balance, strength, initiate HEP   PT Discharge Recommendation (DC Rec) home with home care physical therapy   PT Rationale for DC Rec Pt is currently below baseline, mainly limited by dyspnea with spO2 going down to 88% during today's session after hallway ambulation. Pt reports no concerns on going home and is well aware of her own limitations. She has family support nearby. Current therapy recommendation home with home PT for progression of  strength, balance, activity tolerance.   PT Brief overview of current status Ax1 with FWW, monitor spO2 for longer distances; Goals of therapy will be to address safe mobility and make recs for d/c to next level of care. Pt and RN will continue to follow all falls risk precautions as documented by RN staff while hospitalized.   PT Total Distance Amb During Session (feet) 140   Physical Therapy Time and Intention   Timed Code Treatment Minutes 15   Total Session Time (sum of timed and untimed services) 25

## 2025-04-19 NOTE — PROGRESS NOTES
Care Management Discharge Note    Discharge Date: 04/19/2025       Discharge Disposition: Home Care resumption of RN plus PT/OT through [facility]    Discharge Services: Home Care resumption    Discharge DME: None    Discharge Transportation: family or friend will provide    Private pay costs discussed: Not applicable    Does the patient's insurance plan have a 3 day qualifying hospital stay waiver?  No    PAS Confirmation Code:  n/a  Patient/family educated on Medicare website which has current facility and service quality ratings: no    Education Provided on the Discharge Plan:  yes  Persons Notified of Discharge Plans: patient  Patient/Family in Agreement with the Plan: yes    Handoff Referral Completed: Yes, [facility] PCP: Internal handoff referral completed    Additional Information:  Writer met with the patient at the bedside. Patient A+Ox4 up with nursing. Patient is cleared for discharge to home with recommended follow up's and resumption of homecare RN+PT/OT through [facility]. Patient identified no further needs at this time.        Piper Xie RN, BSN, ACM   Care Transitions Specialist  [facility]  Care Transitions Specialist  [address] [place] [address]  [email or web address]  Office: [phone] Fax: [phone]  [facility]

## 2025-04-21 ENCOUNTER — PATIENT OUTREACH (OUTPATIENT)
Dept: CARE COORDINATION | Facility: CLINIC | Age: 65
End: 2025-04-21

## 2025-04-21 ENCOUNTER — TELEPHONE (OUTPATIENT)
Dept: ANTICOAGULATION | Facility: CLINIC | Age: 65
End: 2025-04-21

## 2025-04-21 ENCOUNTER — PRE VISIT (OUTPATIENT)
Dept: PULMONOLOGY | Facility: CLINIC | Age: 65
End: 2025-04-21

## 2025-04-21 ENCOUNTER — VIRTUAL VISIT (OUTPATIENT)
Dept: PULMONOLOGY | Facility: CLINIC | Age: 65
End: 2025-04-21
Payer: COMMERCIAL

## 2025-04-21 VITALS — HEIGHT: 66 IN | BODY MASS INDEX: 48.18 KG/M2

## 2025-04-21 DIAGNOSIS — R06.02 EXERTIONAL SHORTNESS OF BREATH: ICD-10-CM

## 2025-04-21 DIAGNOSIS — J41.0 SIMPLE CHRONIC BRONCHITIS (H): Primary | ICD-10-CM

## 2025-04-21 DIAGNOSIS — Z79.01 LONG TERM CURRENT USE OF ANTICOAGULANT THERAPY: ICD-10-CM

## 2025-04-21 DIAGNOSIS — I26.99 PULMONARY EMBOLI (H): Primary | ICD-10-CM

## 2025-04-21 LAB
PATH REPORT.COMMENTS IMP SPEC: NORMAL
PATH REPORT.FINAL DX SPEC: NORMAL
PATH REPORT.GROSS SPEC: NORMAL
PATH REPORT.MICROSCOPIC SPEC OTHER STN: NORMAL
PATH REPORT.RELEVANT HX SPEC: NORMAL

## 2025-04-21 RX ORDER — FLUTICASONE FUROATE, UMECLIDINIUM BROMIDE AND VILANTEROL TRIFENATATE 100; 62.5; 25 UG/1; UG/1; UG/1
1 POWDER RESPIRATORY (INHALATION) DAILY
Qty: 1 EACH | Refills: 11 | Status: SHIPPED | OUTPATIENT
Start: 2025-04-21

## 2025-04-21 ASSESSMENT — PAIN SCALES - GENERAL: PAINLEVEL_OUTOF10: MODERATE PAIN (6)

## 2025-04-21 NOTE — LETTER
I am a team member within the [facility] [place] [facility]. I hope you are doing well following a recent hospitalization or TCU stay. I would like to take this chance to introduce Clinic Care Coordination    Below is a description of Clinic Care Coordination and how this team can further assist you:       The Clinic Care Coordination team is made up of a Registered Nurse, , Financial Resource Worker, and a Community Health Worker who understand and can help navigate the health care system. The goal of clinic care coordination is to help you manage your health, improve access to care, and achieve optimal health outcomes. They work alongside your provider to assist you in determining your health and social needs, obtain health care and community resources, and provide you with necessary information and education. Clinic Care Coordination can work with you through any barriers and develop a care plan that helps coordinate and strengthen the relationship between you and your care team.    If you wish to connect with the Clinic Care Coordination Team, please let your [facility] Primary Care Provider or Clinic Care Team know and they can place a referral. The Clinic Care Coordination team will then reach out by phone to further support you.    We are focused on providing you with the highest-quality healthcare experience possible.    Sincerely,   Your care team with [facility]'s [address] [place] [address] ([phone]).

## 2025-04-21 NOTE — Clinical Note
[facility] CARE COORDINATION  ***  April 21, 2025    Shira Rodriguez  [address]   [place] [address]      Dear Shira,    I am a {clinic role :[id number]}

## 2025-04-21 NOTE — PROGRESS NOTES
[facility]  Hospital Discharge    Clinical Data: Per chart review, patient has discharged from Hospital to home/community setting.    Assessment/Plan: Transitional Care Management (TCM) program initiated to prompt post-discharge outreach call by CCR team member.     aDra Millard  [facility]

## 2025-04-21 NOTE — PROGRESS NOTES
Connected Care Resource Center: [facility]    Background: Transitional Care Management program identified per system criteria and reviewed by Connected Care Resource Center team for possible outreach.    Assessment: Upon chart review, CCRC Team member will not proceed with patient outreach related to this episode of Transitional Care Management program due to reason below:    Patient has active communication with a nurse, provider or care team for reason of post-hospital follow up plan.  Outreach call by CCRC team not indicated to minimize duplicative efforts.     Plan: Transitional Care Management episode addressed appropriately per reason noted above.      Martha Jarquin  Ambulatory Care  - [facility]        *Connected Care Resource Team does NOT follow patient ongoing. Referrals are identified based on internal discharge reports and the outreach is to ensure patient has an understanding of their discharge instructions.

## 2025-04-21 NOTE — PROGRESS NOTES
Virtual Visit Details    Type of service:  Video Visit   Video Start Time: 3:21 PM  Video End Time: 337 PM    Originating Location (pt. Location): Home    Distant Location (provider location):  On-site  Platform used for Video Visit: [facility]    LUNG NODULE & INTERVENTIONAL PULMONARY CLINIC  CLINICS & SUR[facility]ERY [facility], [facility]     Shira Rodriguez MRN# [id number]   Age: 64 year old YOB: 1960       Requesting Physician: Felipe Be MD  [address] [place] [address]       Assessment and Plan:    1. Recurrent bloody effusion that seems c/w rheumatoid effusion - high ldh, extremely low glucose, debris (due to this we cannot get a cell count).  This has come up in the setting of her stopping her biologic.  Her other disease has not worsened which is not typical.  I will review with her rheumatologist.    The treatment is most likely medication based. I don't think other interventions are good options.    2. Chronic bronchitis:  PFTs within reasonable limits but has had a lot more persistent chest cold and chest congestion.  Start trelegy and we will see how she does.             History:     Shira Rodriguez is a 64 year old female with sig h/o for RA who is here for evaluation/followup of pleural effusion.  Her symptoms are primarily in her wrists and fingers.  She started a new biologic and didn't notice any changes in symptoms so she stopped it a few months ago. She has had more problems with dyspnea.    She has had persistent chest cold symptoms as well.      - My interpretation of the images relevant for this visit includes: R pleural effusion              Past Medical History:      Past Medical History:   Diagnosis Date    Acute massive pulmonary embolism (H) 2019    Acute on chronic congestive heart failure, unspecified heart failure type (H) 4/15/2025    Cardiac arrest (H) 2019    Due to PE; required ECMO    Chronic bronchitis (H) 07/30/2015     Chronic pain     Contact dermatitis     COPD (chronic obstructive pulmonary disease) (H)     Depressive disorder 1985    Eczema     HANDS    Elevated liver enzymes     H/O total knee replacement, left     History of blood transfusion     History of total hip replacement 10/27/2011    Hyperlipidemia     Hypertension     Morbid obesity (H)     Osteoarthrosis     right knee    Paroxysmal atrial fibrillation (H)     RA (rheumatoid arthritis) (H) 8/2018    Sleep apnea     Tobacco use disorder     Varicella 1965           Past Surgical History:      Past Surgical History:   Procedure Laterality Date    ARTHROPLASTY KNEE  6/14/2012    Procedure: ARTHROPLASTY KNEE;  Left Total Knee Arthroplasty;  Surgeon: Annette Quesada MD;  Location: [facility] OR    ARTHROSCOPY HIP Right     BONE MARROW BIOPSY, BONE SPECIMEN, NEEDLE/TROCAR N/A 2/3/2023    Procedure: bone marrow biopsy;  Surgeon: So Horowitz MD;  Location:  GI    BRONCHOSCOPY FLEXIBLE AND RIGID N/A 9/17/2019    Procedure: Bronchoscopy flexible;  Surgeon: Patrick Rayo MD;  Location: [facility] OR    COLONOSCOPY N/A 7/16/2021    Procedure: COLONOSCOPY, WITH POLYPECTOMY AND BIOPSY;  Surgeon: Diogo Lynch MD;  Location:  GI    CV EXTRACORPERAL MEMBRANE OXYGENATION N/A 9/9/2019    Procedure: Extracorporeal Membrance Oxygenation;  Surgeon: Kaleb Mcfarlane MD;  Location:  HEART CARDIAC CATH LAB    INSERT EXTRACORPORAL MEMBRANE OXYGENATOR N/A 9/17/2019    Procedure: Venous-Venous cannulation using ultrasound guidance and transesophageal echocardiogram, venous-arterial ecmo decannulation and repair of left femoral artery;  Surgeon: Patrick Rayo MD;  Location: [facility] OR    IR MECH THROMB PRIM ART, NON-INTRACRNL  9/10/2019    IR PULMONARY ANGIOGRAM BILATERAL  9/10/2019    IR THROMBOLYSIS ARTERIAL INFUSION INITIAL DAY  9/10/2019    THORACENTESIS N/A 1/16/2020    Procedure: THORACENTESIS;  Surgeon: Georgina Richardson MD;  Location: [facility]  TOTAL HIP ARTHROPLASTY  04    RT          Social History:     Social History     Tobacco Use    Smoking status: Former     Current packs/day: 0.00     Types: Cigarettes     Start date: 1982     Quit date: 2019     Years since quittin.6    Smokeless tobacco: Never   Substance Use Topics    Alcohol use: Not Currently     Comment: Rarely          Family History:     Family History   Problem Relation Age of Onset    Thyroid Disease Mother     Hypertension Mother     Skin Cancer Mother         MMohs surgery with skin graft    Cerebrovascular Disease Mother         CVA after endarderectomy-     Diabetes Mother     Depression Mother     Alcoholism Father     Heart Disease Father     Substance Abuse Father             Heart Disease Sister         multiple ablations    Alcoholism Sister     Substance Abuse Sister         Sober    Depression Sister     Substance Abuse Sister         Sober 30 plus years    Depression Sister     Thyroid Disease Sister     Hypertension Maternal Grandmother     Depression Maternal Grandmother     Breast Cancer Paternal Grandmother     Pancreatic Cancer Paternal Grandmother     Prostate Cancer Paternal Grandfather     Cardiovascular Paternal Aunt     Cardiovascular Paternal Uncle     Depression Cousin     Depression Other            Allergies:      Allergies   Allergen Reactions    Adhesive Tape Blisters    Erythromycin Rash          Medications:     Current Outpatient Medications   Medication Sig Dispense Refill    acetaminophen (TYLENOL) 325 MG tablet Take 2 tablets (650 mg) by mouth every 6 hours as needed for mild pain or fever      albuterol (PROAIR HFA/PROVENTIL HFA/VENTOLIN HFA) 108 (90 Base) MCG/ACT inhaler Inhale 2 puffs into the lungs every 6 hours as needed for shortness of breath, wheezing or cough. 18 g 3    ARIPiprazole (ABILIFY) 20 MG tablet Take 20 mg by mouth daily.      furosemide (LASIX) 20 MG tablet Take 2 tablets (40 mg) by mouth daily as  "needed (for leg edema or weight gain >2 lbs in 24 hours).      guaiFENesin (MUCINEX) 600 MG 12 hr tablet Take 1 tablet (600 mg) by mouth 2 times daily as needed for congestion or cough. 30 tablet 0    guaiFENesin-dextromethorphan (ROBITUSSIN DM) 100-10 MG/5ML syrup Take 10 mLs by mouth 4 times daily as needed for cough. 236 mL 0    hydroxychloroquine (PLAQUENIL) 200 MG tablet TAKE 1 TABLET(200 MG) BY MOUTH TWICE DAILY 60 tablet 7    lisinopril (ZESTRIL) 5 MG tablet Take 1 tablet (5 mg) by mouth daily. (Hold until repeat BMP with PCP follow up)      miconazole (MICATIN) 2 % external powder Apply topically 2 times daily. Apply to rash under abdominal folds. 43 g 0    multivitamin w/minerals (THERA-VIT-M) tablet Take 1 tablet by mouth daily 100 tablet 1    nystatin (MYCOSTATIN) 157027 UNIT/GM external powder Apply topically 4 times daily as needed 60 g 1    Respiratory Therapy Supplies (AEROBIKA) YASMANI 1 each 3 times daily. 1 each 2    senna-docusate (SENOKOT-S/PERICOLACE) 8.6-50 MG tablet Take 1 tablet by mouth 2 times daily as needed for constipation 180 tablet 1    venlafaxine (EFFEXOR XR) 150 MG 24 hr capsule Take 150 mg by mouth daily.      VITAMIN D3 25 MCG (1000 UT) tablet Take 50 mcg by mouth daily.      warfarin ANTICOAGULANT (COUMADIN) 5 MG tablet Take 2 tablets (10mg) daily or as directed by Anticoagulation Clinic-adjusted per INR results 180 tablet 1     No current facility-administered medications for this visit.     Facility-Administered Medications Ordered in Other Visits   Medication Dose Route Frequency Provider Last Rate Last Admin    sodium chloride (PF) 0.9% PF flush 10 mL  10 mL Intracatheter Once Loy Joshi APRN CNP              Review of Systems:     See HPI         Physical Exam:   Ht 1.676 m (5' 6\")   BMI 48.18 kg/m      Constitutional - looks well, in no apparent distress  Eyes - no redness or discharge  Respiratory -breathing appears comfortable. No wheeze or rhonchi.   Skin - " No appreciable discoloration or lesions (very limited exam)  Neurological - No apparent tremors. Speech fluent and articlate  Psychiatric - no signs of delirium or anxiety          Current Laboratory Data:   All laboratory and imaging data reviewed.    No results found. However, due to the size of the patient record, not all encounters were searched. Please check Results Review for a complete set of results.

## 2025-04-21 NOTE — TELEPHONE ENCOUNTER
ANTICOAGULATION  MANAGEMENT: Discharge Review    Shira Rodriguez chart reviewed for anticoagulation continuity of care    Hospital Admission on 4/15-4/19/25 for right pleural effusion, A/C CHF. US guided thoracentesis (4/17). INR therapeutic on admission; warfarin was held for thoracentesis     Discharge disposition: Home with Home Care    Results:    Recent labs: (last 7 days)     04/15/25  1749 04/16/25  0719 04/17/25  0642 04/18/25  0628 04/19/25  0653   INR 3.04* 3.08* 2.42* 2.00* 1.96*     Anticoagulation inpatient management:     4/17 10 mg  4/18 12.5 mg  4/19 13 mg    anticoagulation calendar updated with inpatient dosing    Anticoagulation discharge instructions:     Warfarin dosing: home regimen continued   Bridging: No   INR goal change: No      Medication changes affecting anticoagulation:     Lasix change:  Take 2 tablets (40 mg) by mouth daily as needed (for leg edema or weight gain >2 lbs in 24 hours).     Additional factors affecting anticoagulation: No     PLAN     Recommend to check INR on 4/22/25    Spoke with Shira    Anticoagulation Calendar updated    YUNIER DAVIDSON, RN  4/21/2025  Anticoagulation Clinic  [facility] for routing messages: [facility]  [facility] patient phone line: [phone]

## 2025-04-21 NOTE — NURSING NOTE
Current patient location: [address] [place] [address] [address]   [facility] [address]    Is the patient currently in the state of [place]? YES    Visit mode: VIDEO    If the visit is dropped, the patient can be reconnected by:VIDEO VISIT: Text to cell phone:   Telephone Information:   Mobile [phone]       Will anyone else be joining the visit? NO  (If patient encounters technical issues they should call [phone])    Are changes needed to the allergy or medication list? No    Are refills needed on medications prescribed by this physician? NO    Rooming Documentation:  Questionnaire(s) not pre-assigned Patient declined to complete questionnaire(s)(PHQ2)    Reason for visit: Consult    Tahmina AYERS

## 2025-04-21 NOTE — PLAN OF CARE
Physical Therapy Discharge Summary    Reason for therapy discharge:    Discharged to transitional care facility.    Progress towards therapy goal(s). See goals on Care Plan in [facility] electronic health record for goal details.  Goals not met.  Barriers to achieving goals:   discharge from facility.    Therapy recommendation(s):    Continued therapy is recommended.  Rationale/Recommendations:  to advance indepdence with functional mobility..      Summary completed from chart review patient not seen by documenting therapist

## 2025-04-22 ENCOUNTER — TELEPHONE (OUTPATIENT)
Dept: ANTICOAGULATION | Facility: CLINIC | Age: 65
End: 2025-04-22

## 2025-04-22 ENCOUNTER — ANTICOAGULATION THERAPY VISIT (OUTPATIENT)
Dept: ANTICOAGULATION | Facility: CLINIC | Age: 65
End: 2025-04-22

## 2025-04-22 ENCOUNTER — VIRTUAL VISIT (OUTPATIENT)
Dept: INTERNAL MEDICINE | Facility: CLINIC | Age: 65
End: 2025-04-22
Payer: COMMERCIAL

## 2025-04-22 ENCOUNTER — TELEPHONE (OUTPATIENT)
Dept: INTERNAL MEDICINE | Facility: CLINIC | Age: 65
End: 2025-04-22

## 2025-04-22 DIAGNOSIS — Z09 HOSPITAL DISCHARGE FOLLOW-UP: Primary | ICD-10-CM

## 2025-04-22 DIAGNOSIS — Z79.01 LONG TERM CURRENT USE OF ANTICOAGULANT THERAPY: ICD-10-CM

## 2025-04-22 DIAGNOSIS — I50.30 NYHA CLASS 3 HEART FAILURE WITH PRESERVED EJECTION FRACTION (H): ICD-10-CM

## 2025-04-22 DIAGNOSIS — N18.30 STAGE 3 CHRONIC KIDNEY DISEASE, UNSPECIFIED WHETHER STAGE 3A OR 3B CKD (H): ICD-10-CM

## 2025-04-22 DIAGNOSIS — I26.99 PULMONARY EMBOLI (H): Primary | ICD-10-CM

## 2025-04-22 LAB
BACTERIA PLR CULT: NO GROWTH
GRAM STAIN RESULT: NORMAL
GRAM STAIN RESULT: NORMAL
INR (EXTERNAL): 3.1
INR (EXTERNAL): 3.1

## 2025-04-22 NOTE — TELEPHONE ENCOUNTER
Return call made to ROSA Santacruz regarding patient's INR result and recommendations. See [facility] encounter for details.     YUNIER DAVIDSON RN  Anticoagulation Clinic  [phone]

## 2025-04-22 NOTE — PATIENT INSTRUCTIONS
Thank you for visiting the Primary Care Center today at the [facility]! The following is some information about our clinic:     Primary Care Center Frequently-Asked Questions    (1) How do I schedule appointments at the [facility]?     Primary Care--to schedule or make changes to an existing appointment, please call our primary care line at [phone].    Labs--to schedule a lab appointment at the [facility] you can use [email or web address] or call [phone]. If you have a [place] location that is closer to home, you can reach out to that location for scheduling options.     Imaging--if you need to schedule a CT, X-ray, MRI, ultrasound, or other imaging study you can call [phone] to schedule at the [facility] or any other [facility] imaging location.     Referrals--if a referral to another specialty was ordered you can expect a phone call from their scheduling team. If you have not heard from them in a week, please call us or send us a [email or web address] message to check the status or get a scheduling number. Please note that this only applies to internal [facility] referrals. If the referral is external you would need to contact their office for scheduling.     (2) I have a question about my visit, who do I contact?     You can call us at the primary care line at [phone] to ask questions about your visit. You can also send a secure message through [email or web address], which is reviewed by clinic staff. Please note that [email or web address] messages have a twenty-four to forty-eight business hour turnaround time and should not be used for urgent concerns.    (3) How will I get the results of my tests?    If you are signed up for iversityt all tests will be released to you within twenty-four hours of resulting. Please allow three to five days for your doctor to review your results and place a note interpreting the results. If you do not have Jaxtrhart you will receive your  results through mail seven to ten business days following the return of the tests. Please note that if there should be any urgent or concerning results that your doctor or their registered nurse will reach out to you the same day as the tests come back. If you have follow up questions about your results or would like to discuss the results in detail please schedule a follow up with your provider either in person or virtually.     (4) How do I get refills of my prescriptions?     You should always first contact your pharmacy for refills of your medications. If submitting a refill request on [employer], please be sure to submit the request only once--repeat requests can cause delays in refill. If you are requesting a NEW medication or a medication related to new symptoms you will need to schedule an appointment with a provider prior to approval. Please note: Routine medication refills have up to one to three business day turnaround whereas controlled substances refills have up to five to seven business day turnaround.    (5) I have new symptoms, what do I do?     If you are having an immediate medical emergency, you should dial 911 for assistance.   For anything urgent that needs to be seen within a few hours to one day you should visit a local urgent care for assistance.  For non-urgent symptoms that need to be seen within a few days to a week you can schedule with an available provider in primary care by going to [facility] or calling [phone].   If you are not sure how serious your symptoms are or you would like to receive medical advice you can always call [phone] to speak with a triage nurse.

## 2025-04-22 NOTE — TELEPHONE ENCOUNTER
Called back Anne Marie- she endorses patient is up to 312 lbs. Ok for other orders. She will draw patient's bmp on Thursday. Gave parameters for lasix and weight listed in visit note. Will discuss with providers if patient should still hold off on lasix prior to bmp.   Patient endorses mild SANTOS and trace edema in legs, but otherwise feels ok.     Rigo Aquino RN on 4/22/2025 at 1:41 PM

## 2025-04-22 NOTE — TELEPHONE ENCOUNTER
Okay for one dose of 40mg Lasix today and then check BMP on Thursday as planned.    Thanks,  Felipe Be MD

## 2025-04-22 NOTE — PROGRESS NOTES
ANTICOAGULATION MANAGEMENT     Shira PETER Michael 64 year old female is on warfarin with supratherapeutic INR result. (Goal INR 2.0-3.0)    Recent labs: (last 7 days)     04/22/25  1348   INR 3.1       ASSESSMENT     Source(s): Chart Review, Patient/Caregiver Call, and Home Care/Facility Nurse     Warfarin doses taken: Booster dose(s) recently taken which may be affecting INR and Held for thoracentesis  recently which may be affecting INR  Diet: No new diet changes identified  Medication/supplement changes:  will be stopping Lisinopril today  New illness, injury, or hospitalization: Yes: Hospital Admission on 4/15-4/19/25 for right pleural effusion, A/C CHF. US guided thoracentesis (4/17).   Signs or symptoms of bleeding or clotting: No  Previous result: Subtherapeutic  Additional findings: home care now following-[facility] backline phone number provided to call in INR results.        PLAN     Recommended plan for temporary change(s) affecting INR     Dosing Instructions: partial hold then continue your current warfarin dose with next INR in 3 days       Summary  As of 4/22/2025      Full warfarin instructions:  4/22: 7.5 mg; Otherwise 10 mg every day   Next INR check:  4/25/2025               Telephone call with ROSA Lewis who agrees to plan and repeated back plan correctly    Orders given to  Homecare nurse/facility to recheck    Education provided: Goal range and lab monitoring: goal range and significance of current result and Importance of following up at instructed interval  Symptom monitoring: monitoring for bleeding signs and symptoms  Contact [phone] with any changes, questions or concerns.     Plan made per [facility] anticoagulation protocol    YUNIER DAVIDSON RN  4/22/2025  Anticoagulation Clinic  [employer] for routing messages: [facility]  [facility] patient phone line: [phone]        _______________________________________________________________________     Anticoagulation Episode Summary        Current INR goal:  2.0-3.0   TTR:  63.7% (11.9 mo)   Target end date:  Indefinite   Send INR reminders to:  [facility]    Indications    Pulmonary emboli (H) [I26.99]  Long term current use of anticoagulant therapy [Z79.01]             Comments:  --             Anticoagulation Care Providers       Provider Role Specialty Phone number    Anjel Busby MD Referring [facility] Medicine [phone]

## 2025-04-22 NOTE — TELEPHONE ENCOUNTER
Spoke with Chiara with [facility] and gave the following verbal order(s): Home Care Orders: Delay in start of homecare to today 4/22.  Aura YO LPN  [facility]

## 2025-04-22 NOTE — TELEPHONE ENCOUNTER
[facility] Call Center    Phone Message    May a detailed message be left on voicemail: yes     Reason for Call: Order(s): Home Care Orders: Other: Delay in start of homecare to today 4/22 per pt request

## 2025-04-22 NOTE — TELEPHONE ENCOUNTER
Voicemail left on [place] line: Neeta calling to confirm when patient is due for next INR check     Asking for return call at [phone]

## 2025-04-22 NOTE — TELEPHONE ENCOUNTER
[facility] Call Center    Phone Message    May a detailed message be left on voicemail: yes     Reason for Call: Order(s): Home Care Orders: Physical Therapy (PT): eval and treat, Occupational Therapy (OT): eval and treat, and Skilled Nursing: twice a week for two weeks, then once a week for five weeks.     Clarification needed on if 500MG tylenol twice daily is ok for patient to take, home care also needs weight parameters and wants to discuss patients current weight from discharged recorded weight.     Action Taken: Message routed to:  [facility] pcc    Travel Screening: Not Applicable     Date of Service:

## 2025-04-22 NOTE — PROGRESS NOTES
Shira is a 64 year old who is being evaluated via a billable video visit.    How would you like to obtain your AVS? MyChart  If the video visit is dropped, the invitation should be resent by: Text to cell phone: [phone]  Will anyone else be joining your video visit? No    Are refills needed on medications prescribed by this physician? NO    Assessment & Plan     64 year old woman, Her history significant for unprovoked PE c/b cardiac arrest requiring VV ECMO, reactive airway disease, chronic bronchitis, and former smoker, who was seen via video visit today following recent hospitalization for acute heart failure and management of pleural effusions.    # Exertional shortness of breath, improving  # Chronic heart failure with preserved ejection fraction  # Bilateral pleural effusion (right>left), s/p thoracentesis (4/17/25)  Patient diagnosed with acute heart failure 2 weeks prior as evident by exertional dyspnea, evidence of pulmonary edema on chest x-ray and elevated BNP.  Last echocardiogram done in 2023 showing a normal EF.  Initially trialed oral diuretics with minimal improvement so encourage patient to present to local ED for inpatient management.  Patient was hospitalized from 4/15 to 4/19 at [facility] where she was given IV diuretics and subsequently had a thoracentesis done with 1.1 L of serosanguineous fluid removed.  Review of the pleural fluid studies shows no evidence of an active infection or malignancy.  There was increased cellularity so there is potential for this to be related to her underlying rheumatoid arthritis but does not otherwise seem convincing.  Potentially likely related to the patient's acute heart failure exacerbation.  At this juncture, we will plan to repeat labs to assess kidney function before restarting PRN diuretics and blood pressure medications.  Will also plan to introduce additional GDMT for patient's chronic heart failure.  - Recheck BMP to reassess kidney  function  - GDMT:  - Diuretics: 40 mg Lasix PRN for weight changes - holding up repeat BMP is done   - Educated patient to weigh herself daily and to take the lasix dose if 1) weight increases > 3 lbs in 24 hours or 2) > 5 lbs in 1 week  - ACEi/ARB: Lisinopril 5 mg - holding until repeat BMP is done  - SGLT2i: Started Jardiance 10 mg daily  - BB: None yet started  - MRA: None yet started  - Devices: Not indicated  - Educated patient regarding low-Na diet and following a 2L fluid limit daily  - Continue home PT/OT  - Scheduled to see Cardiology on 05/23/2025    # Elevated Creatinine  # CKD stage III  Cr baseline: 1.4 - 1.6. Cr at 1.72 at time of hospital discharge  - Repeat BMP ordered for today  - Continue holding lisinopril at this time  - Started Jardiance 10 mg daily    # Chronic bronchitis  Seen by Pulmonary virtually on 4/21 who prescribed her trelegy.  - Cont Trelegy 1 puff daily as instructed    Return to clinic: 6 months or sooner if new symptoms present    Patient case discussed with attending provider, Dr. Be.    Amaury Cortes M.D.  Internal Medicine Resident  PGY-2   [facility]      Subjective     Shira Rodriguez is a 64 year old woman, Her history significant for unprovoked PE c/b cardiac arrest requiring VV ECMO, reactive airway disease, chronic bronchitis, and former smoker, who was seen via video visit today following recent hospitalization for acute heart failure and management of pleural effusions.    Video Start Time: 12:11 PM    Patient diagnosed with acute heart failure 2 weeks prior as evident by exertional dyspnea, evidence of pulmonary edema on chest x-ray and elevated BNP.  Last echocardiogram done in 2023 showing a normal EF.  Initially trialed oral diuretics with minimal improvement so encourage patient to present to local ED for inpatient management.  Patient was hospitalized from 4/15 to 4/19 at [facility] where she was given IV  "diuretics and subsequently had a thoracentesis done with 1.1 L of serosanguineous fluid removed.  Patient demonstrated marked improvement in her breathing so she was ultimately discharged home with home health care and home PT/OT.  Today, during her video visit, patient says her breathing is still improved compared to last visit, but she still feels a little bit fatigued and winded.  She believes that this will improve with her home physical therapy which will begin on 4/24.  No other symptoms endorsed at this time and no other complaints.      Review of Systems  Constitutional, HEENT, cardiovascular, pulmonary, gi and gu systems are negative, except as otherwise noted.      Objective    Vitals - Patient Reported  Weight (Patient Reported): 141.5 kg (312 lb)  Height (Patient Reported): 167.6 cm (5' 6\")  BMI (Based on Pt Reported Ht/Wt): 50.36  Pain Score: Moderate Pain (6)  Pain Loc: Other - see comment (joint pain)      Vitals:  No vitals were obtained today due to virtual visit.    Physical Exam   GENERAL: alert and no distress  EYES: Eyes grossly normal to inspection.  No discharge or erythema, or obvious scleral/conjunctival abnormalities.  RESP: No audible wheeze, cough, or visible cyanosis.    SKIN: Visible skin clear. No significant rash, abnormal pigmentation or lesions.  NEURO: Cranial nerves grossly intact.  Mentation and speech appropriate for age.  PSYCH: Appropriate affect, tone, and pace of words    CXR - Reviewed and interpreted by me Normal- no infiltrates, effusions, pneumothoraces, cardiomegaly or masses  Pulmonary edema  Lab on 04/08/2025   Component Date Value Ref Range Status    WBC Count 04/08/2025 7.1  4.0 - 11.0 10e3/uL Final    RBC Count 04/08/2025 4.47  3.80 - 5.20 10e6/uL Final    Hemoglobin 04/08/2025 13.1  11.7 - 15.7 g/dL Final    Hematocrit 04/08/2025 41.4  35.0 - 47.0 % Final    MCV 04/08/2025 93  78 - 100 fL Final    MCH 04/08/2025 29.3  26.5 - 33.0 pg Final    MCHC 04/08/2025 31.6  " 31.5 - 36.5 g/dL Final    RDW 04/08/2025 13.1  10.0 - 15.0 % Final    Platelet Count 04/08/2025 249  150 - 450 10e3/uL Final    Sodium 04/08/2025 142  135 - 145 mmol/L Final    Potassium 04/08/2025 4.8  3.4 - 5.3 mmol/L Final    Chloride 04/08/2025 108 (H)  98 - 107 mmol/L Final    Carbon Dioxide (CO2) 04/08/2025 24  22 - 29 mmol/L Final    Anion Gap 04/08/2025 10  7 - 15 mmol/L Final    Urea Nitrogen 04/08/2025 28.0 (H)  8.0 - 23.0 mg/dL Final    Creatinine 04/08/2025 1.49 (H)  0.51 - 0.95 mg/dL Final    GFR Estimate 04/08/2025 39 (L)  >60 mL/min/1.73m2 Final    eGFR calculated using 2021 CKD-EPI equation.    Calcium 04/08/2025 9.6  8.8 - 10.4 mg/dL Final    Glucose 04/08/2025 97  70 - 99 mg/dL Final    CRP Inflammation 04/08/2025 35.70 (H)  <5.00 mg/L Final    Erythrocyte Sedimentation Rate 04/08/2025 55 (H)  0 - 30 mm/hr Final    Rheumatoid Factor 04/08/2025 51 (H)  <14 IU/mL Final    N Terminal Pro BNP Outpatient 04/08/2025 1,182 (H)  0 - 900 pg/mL Final    Reference range shown and results flagged as abnormal are for the outpatient, non acute settings. Establishing a baseline value for each individual patient is useful for follow-up.    Suggested inpatient cut points for confirming diagnosis of CHF in an acute setting are:  >450 pg/mL (age 18 to less than 50)  >900 pg/mL (age 50 to less than 75)  >1800 pg/mL (75 yrs and older)    An inpatient or emergency department NT-proPBNP <300 pg/mL effectively rules out acute CHF, with 99% negative predictive value.        Procalcitonin 04/08/2025 0.09  <0.50 ng/mL Final    Comment: Interpretation and Recommendations     <0.5 ng/mL:   Systemic bacterial infection unlikely. Local bacterial infection is possible.     0.5-1.99 ng/mL:   Systemic bacterial infection possible, but various other conditions are known to induce PCT as well.     >=2.00 ng/mL:   Systemic bacterial infection likely, unless other causes are known.     Decision to start antibiotics should not be based  on procalcitonin level alone. See Procalcitonin Guidance document for more details. [email or web address]-[email or web address]-[email or web address]    Factors that may affect PCT levels (not all-inclusive):     - Increased PCT level           Severe trauma/burns           Invasive surgery           Cooling therapy after cardiac arrest/surgery           Treatment with agents which stimulate cytokines           Acute kidney injury           Chronic kidney disease and end stage renal disease           Acute graft vs host disease           Non-specific shock                            causing decreased organ perfusion and/or infarction       - Normal or unchanged PCT level           Early in infections (if low and infection is suspected, repeating in 6-12 hours is recommended)           Chronic infections (endocarditis, osteomyelitis, prosthetic device/graft infections)           Localized infections (cellulitis, wound infections, intra-abdominal abscess)     Note: PCT has not been extensively studied in pregnancy/breastfeeding, pediatrics, severe immunosuppression, and cystic fibrosis.         Video-Visit Details    Type of service:  Video Visit   Video End Time: 12:31 PM  Originating Location (pt. Location): Home    Distant Location (provider location):  On-site  Platform used for Video Visit: Tu  Signed Electronically by: Amaury Cortes MD

## 2025-04-24 ENCOUNTER — DOCUMENTATION ONLY (OUTPATIENT)
Dept: INTERNAL MEDICINE | Facility: CLINIC | Age: 65
End: 2025-04-24

## 2025-04-24 ENCOUNTER — VIRTUAL VISIT (OUTPATIENT)
Dept: RHEUMATOLOGY | Facility: CLINIC | Age: 65
End: 2025-04-24
Payer: COMMERCIAL

## 2025-04-24 VITALS
HEIGHT: 66 IN | SYSTOLIC BLOOD PRESSURE: 112 MMHG | WEIGHT: 293 LBS | BODY MASS INDEX: 47.09 KG/M2 | HEART RATE: 84 BPM | DIASTOLIC BLOOD PRESSURE: 68 MMHG

## 2025-04-24 DIAGNOSIS — M06.00 RHEUMATOID ARTHRITIS WITH NEGATIVE RHEUMATOID FACTOR, INVOLVING UNSPECIFIED SITE (H): ICD-10-CM

## 2025-04-24 PROCEDURE — 3078F DIAST BP <80 MM HG: CPT | Performed by: INTERNAL MEDICINE

## 2025-04-24 PROCEDURE — 99496 TRANSJ CARE MGMT HIGH F2F 7D: CPT | Performed by: INTERNAL MEDICINE

## 2025-04-24 PROCEDURE — 3074F SYST BP LT 130 MM HG: CPT | Performed by: INTERNAL MEDICINE

## 2025-04-24 PROCEDURE — 1126F AMNT PAIN NOTED NONE PRSNT: CPT | Performed by: INTERNAL MEDICINE

## 2025-04-24 RX ORDER — HYDROXYCHLOROQUINE SULFATE 200 MG/1
200 TABLET, FILM COATED ORAL 2 TIMES DAILY
Qty: 60 TABLET | Refills: 8 | Status: SHIPPED | OUTPATIENT
Start: 2025-04-24

## 2025-04-24 ASSESSMENT — PAIN SCALES - GENERAL: PAINLEVEL_OUTOF10: NO PAIN (0)

## 2025-04-24 NOTE — NURSING NOTE
Current patient location: [address] [place] [address] [address]   [facility] [address]    Is the patient currently in the state of [place]? YES    Visit mode: VIDEO    If the visit is dropped, the patient can be reconnected by:VIDEO VISIT: Text to cell phone:   Telephone Information:   Mobile [phone]       Will anyone else be joining the visit? NO  (If patient encounters technical issues they should call [phone]    Are changes needed to the allergy or medication list? No    Are refills needed on medications prescribed by this physician? NO    Rooming Documentation:  Questionnaire(s) completed    Reason for visit: RECHECK    Estee NOVAKF

## 2025-04-24 NOTE — PROGRESS NOTES
Virtual Visit Details    Type of service:  Video Visit   Video Start Time: 3:51 PM  Video End Time:415 p.m    Originating Location (pt. Location): Home    Distant Location (provider location):  On-site  Platform used for Video Visit: [facility]      Rheumatology Clinic  Everett Austin MD  2025     Name: Shira Rodriguez  MRN: [id number]  Age: 65 year old  : 1960  Referring provider: Anjel Busby     Assessment and Plan:  # Inflammatory Arthritis (+RF/low+CCP, dx 2017 with diffuse polyarticular inflammatory flare):   Ms. Rodriguez reports overall stability in small joint predominant pain, stiffness, and swelling since the last visit.  Video exam shows thenar wasting in both hands, incomplete extension in multiple digits on the right.  Limited wrist flexion and extension on the left.    Data: In  creatinine was 1. 67 stable since 2023.    In 2022 rheumatoid factor was greater than 200 international units, increased in titer significantly since .  Cyclic citrullinated peptide antibodies were present at 11 units.  Imaging: Foot x-rays 2023 showed degenerative arthritis right second MTP heterotopic bone over the distal Achilles on the right, and forefoot soft tissue swelling.    Discussion: Former prominent symptoms of bilateral knuckle and finger pain, prolonged morning stiffness, bilateral midfoot pain have been much better controlled in the first several months of .  Improved symptoms were noted last  after patient had started Rinvoq in the summer .  However, patient apparently self discontinued Rinvoq in early . unfortunately, patient hospitalized with new onset pleural effusions in , April.    I agree with Dr. Johnston that characteristics of pleural effusion with high LDH, low glucose, are consistent with an exudative/inflammatory effusion.  Such effusions can be seen with rheumatoid arthritis, and pleural disease does not necessarily track  with joint disease.  To address serosal inflammation, as well as to reduce risk of inflammatory joint symptoms and permanent injury to joints from rheumatoid arthritis, I recommend resuming Rinvoq.  If Rinvoq is not well-tolerated, or if pleural effusions recur despite restart of this medication, I would recommend consideration of IL-6 inhibition with Actemra, while simultaneously continuing evaluation for potential contributions from cardiac or pulmonary parenchymal disease to the pleural effusions.  While on rinvoq, patient should undergo every 3-month evaluations of liver function, kidney function, blood counts.  I recommend continuing hydroxychloroquine while starting upadacitinib.  Short-term relief of inflammatory joint pain can be addressed with methylprednisolone 20 mg daily for a week followed by taper.I will defer to pulmonology regarding the recommended frequency and imaging modality follow-up for recurrent pleural effusions.    Note that Bilateral wrist, shoulder and knee predominant pain present mechanical, degenerative joint disease generated pain, and would not be expected to respond to immunomodulatory therapy.        If alternative immunomodulatory therapy is warranted for recurrent inflammatory joint symptoms, I will recommend use of targeted therapy with no myelosuppressive potential, including for example abatacept.    #Shoulder osteoarthritis: Xrays showed joint space narrowing of glenohumeral joints and subluxation with inferior migration of the humeral heads.  We discussed my impression that shoulder pain does not reflect active inflammatory arthritis, and that immunomodulatory medication would not likely improve pain in the shoulders.  I recommend a symptomatic local treatment approach, including physical therapy, to be coordinated through sports/orthopedics    # Severe obesity  # CKD3: GFR of 29 is stable since early 2023.  # Right knee osteoarthritis: Knee injected in October 2023 by  orthopedics.  # Bilateral shoulder pain: Did not discuss today. X-rays suggested presence of advanced degenerative arthritis.  I recommend follow-up with sports medicine/orthopedics, physical therapy.     Diagnosis and plan discussed in clinic:    Diagnosis:  1.  Seropositive rheumatoid arthritis: Joint symptoms are stable/improved however recent hospitalization for no pleural effusions raises possible involvement of rheumatoid inflammation with the pleural disease.    Plan:  1.  Continue hydroxychloroquine 400 mg once daily.  Undergo annual ophthalmology evaluations for rare retinal Plaquenil toxicity.  2.  To address both joint symptoms, and to inflammation at the lung lining, restart upadacitinib (Rinvoq) 15 mg once daily.  While using Rinvoq, undergo every 3-month laboratory testing for liver function kidney function, blood counts.  3.  Follow-up with Pulmonology regarding monitoring of pleural effusion and lung function.    RTC 4 mos    Everett Austin M.D.  Staff Rheumatologist,  Health  Pager [phone]    On the day of the encounter, a total of 45 minutes was spent in chart review, and in counseling and coordination of care, regarding the patient's complex medical problem of severe rheumatoid arthritis.  Visit occurred 5 days after discharge from [facility] and plans discussed at the visit were related to follow-up of hospitalization.      No orders of the defined types were placed in this encounter.       HPI:   Shira Rodriguez has a history of rheumatoid arthritis who presents for follow up. I last saw the patient June 2024.  Seropositive rheumatoid arthritis was stable.  Recommendation was to continue hydroxychloroquine and upadacitinib.  Burst and taper of Medrol was given.    Rheumatological history:  Referrred 2/2017 for 3 months of BL hand pain thought consistent with CTS and positive RF/CCP. EMG showed moderate median neuropathy on L and severe on R, MRI c-spine normal. She  received R CT injection by [facility] with 9 months of relief. She did describe intermittent episodes of swelling of her feet/ankles around this time period as well that resolved with medrol dose pack. She was re-evaluated 8/2017 after developing polyarticular inflammatory arthritis of the right knee, ankles, wrists, hands, and shoulders. She was started on MTX, given depomedrol IM injection, oral prednisone, and given R knee CS injection. MTX increased and oral prednisone tapered to off in 9/2017. Had repeat R CT injection in 10/2017 that lasted until 1/2018. R knee significantly improved following CS injection 12/2017. Has persistent pain in L heel with enthesopathy on imaging, unclear if had true enthesitis in this region due to her RA. Participated in pool therapy winter 2018 with good response. At her 3/2018 OV she continued to have significant pain in her R wrist thought due to CT, but given some ongoing persistent EMS (BL hand stiffness lasting ALL day) as well and her known seropositive disease I recommended adding Humira to her regimen. This was ordered but she never started. Methotrexate Rx continued through 2021, drug stopped as of early 2022. Methotrexate restarted in fall 2022, stopped in January 2023, when patient was admitted to hospital with mucositis and severe cytopenias, severe B12 and folate deficiencies. Hydroxychloroquine started in 10-23, no benefit as of 1-2024; upatacitinib trial 2-2024.  Upadacitinib was discontinued in early 2025, and restarted after hospital admission for exudative pleural effusions, arthritis, in April 2025.    Note: Patient formerly had excellent control of symptoms with methotrexate monotherapy. Unfortunately, methotrexate use in 2022-23 was associated with severe myelosuppressive effects and mucositis, likely contributing to prolonged hospitalization for sepsis in 2023.  I recommend that methotrexate be avoided indefinitely.     Interval history April 24,  2025    Patient was hospitalized at [facility] from April 15 through April 19, 2025.  Per discharge summary by Dr. Martinez, patient had presented to the emergency room with dyspnea on exertion worsening over the several 2 weeks and significant weight gain. imaging showed development of new bilateral pleural effusions. Workup included thoracentesis that yielded 1100 mL of dark red bloody fluid, cytology and microbiology negative, shortness of breath was thought likely due to CHF, although echo showed normal LVEF, in combination with bronchitis and pleural effusions.  Limited benefit from use of Lasix was noted.  Recommendation was to hold lisinopril, change Lasix to as needed, and follow-up with cardiology, rheumatology, renal.    Patient saw Dr. Johnston in pulmonology on April 21, 2025 by virtual visit.  Impression was of dyspnea associated with recurrent bloody effusions.  Characteristics of the effusion were judged compatible with rheumatoid infusion, including high LDH, extremely low glucose, difficulty coming up with cell count due to debris.  Now breathing better, still has productive cough, no blood.    She notes OA pain in shoulders, hip, knee. Using walker outside; started PTx today.  Pain in hands/wrists and feet that she formerly attributed to inflammatory arthritis has been less.  With recent hospitalization for respiratory issues, she reports difficulty with assessing whether there has been a change in duration of morning stiffness, or of constitutional symptoms recently.    Continues PTA hydroxychloroquine, no AE  Patient reports discontinuing Rinvoq some months before hospitalization, but she is not sure when.  Review of note from [facility] pharmacist Mindi Murry in late fall 2024 revealed impression that patient had been taking Rinvoq and hydroxychloroquine together with significant improvement in joint pain symptoms.      Interval history June 7, 2024    Her feet still hurt, both hands and wrists.  "Wrists hurt when pressing down with hands  Pain in feet, not her ankles.  Pain is worse with use of hands and feet.  Early morning stiffness is still 1 hour.  Former pain in ankles knees elbows have improved with combination hydroxychloroquine and Rinvoq.  She reports good tolerability of Rinvoq  She has not used corticosteroids since the last visit.      Interval history January 26, 2024    She has marked joint pain every day. Worst in the morning. Early morning stiffness and pain \"all day\"   Pain in ankles and feet sever with \"first few steps\" as she walks.  Hands hurt such that she cannot pull up a blanket, cook.  R knee hurst much more, no injections X 5 months.  She used prednisone for a flare, but it did not really help with the joint inflammation 3 weeks ago.  Hydroxychloroquine has been used since 10-23; she reports good tolerability, but no benefit.    Interval history October 26, 2023    Today, she reports R foot pain for several weeks. No swelling; pain worse with wt bearing, but constant, no help with tyllenol. No injury. Wrists and shoulders hurt with pressure/wt bearing.    Both hands are painful, stiff, and have been for many weeks. No help with OTC acetaminophen.  R hand goes numb when she eats.    Early morning stiffness lasts hours.    She has continued to recover from critical illness in 2-2023. She can independently feed/dress self.    Rknee pain improved after knee injection in 3-23, but sx recurred. Orthopedics injected again in 10-23. L shoulder also injected.  She has not used oral prednisone for months.      Interval history March 16, 2023    Reports that after the last visit, she misunderstood the therapeutic plan, which involved continued withholding of methotrexate and watchful waiting.  She reports that she continues methotrexate weekly at 20 mg (8 tablets) as directed by prescription.  She used methotrexate right up until hospitalization in January    Patient was admitted to [facility] on January 28, 2023, discharged on February 21, 2023.  Discharge diagnoses include severe sepsis, resolved, acute kidney injury on CKD stage III, resolved.  Upon admission, she had severe neutropenia and pancytopenia, associated with methotrexate use prior to admission.  She was treated with rehydration and empiric antibiotics as well as Neupogen.  Concern about methotrexate use as well as severe B12 and folate deficiency was raised as a cause of neutropenia.  Bone marrow biopsy was negative for malignancy or myelodysplastic disorder.    Today, she reports that she completed a 1 week stay in the transitional care facility, and was discharged 1 week ago.  Since then, she reports joint pain, primarily in both shoulders and the right knee.  Right knee is particularly painful with weightbearing, but also provides night pain.  She has not noticed swelling or warmth. Last injection 6 months ago gave her 3 months of relief.    Shoulders are painful with raising the arms, getting dressed, lifting and reaching.  Pain is much less when shoulders are at rest.  Small joints of the hands and feet have not been painful.  She denies prolonged morning stiffness.      Interval history September 29, 2022    She stopped methotrexate 6 months ago because she could not get syringes.  She reports the happy circumstance that former wrist hand and foot swelling stiffness and pain has not recurred despite being off the methotrexate for 6 months.  Morning stiffness is less than 10 minutes.  She has knee pain that is made worse with walking on uneven ground or when navigating stairs.  But no pain or swelling visible in knees or other joints.  She has not required prednisone or regular use of nonsteroidals since last visit.    Interval history 05-:    Overall joints are doing well.  She has some soreness with flexion of the second and third DIPs in the right hand.  Otherwise there is no swelling, stiffness, redness,  or restriction in motion of hand and finger joints.  Knees bother her with prolonged walking, but she looks forward to returning to the pool after the pandemic for aqua exercise.  She believes that the methotrexate has been associated with greater than 80% improvement in joint pain and other symptoms.    Unrelenting fatigue continues.  She notes a new source of exhaustion and that her mother recently had a stroke and the patient is is sharing personal care duties with a sister.    She is remained on methotrexate through the COVID-19 pandemic.  No further oral ulcerations.  She has not been using leucovorin.  She has used folate 3 mg daily.    Prior history  2-2020    Today, the patient reports that she has been doing well since her last visit. She states that her oral lesions resolved with  leucovorin and folic acid. She also notes that her joint pain and decreased range of motion is limited to her bilateral second digits in her hands. Her left second finger is more stiff than her right, and she denies locking or ratcheting in either finger. She notes that her morning stiffness typically resolves in 15 to 30 minutes. She has no significant swelling in her feet. She notes that she has very little swelling in her legs after starting lasix. She denies rash, shortness of breath, or raynaud's flares.     The patient states that she had a cough prior to her pulmonary embolism/myocardial infarction. She then developed pneumonia while intubated and required a thoracentesis while in the hospital. She reports that she had a persistent cough after this procedure and repeat ultrasound noted further fluid for which she had another thoracentesis which produced 600 ml of fluid. She notes that her cough is improved after her second thoracentesis but is still present. She states her pulmonologist is concerned her remaining cough could be related to her Methotrexate. She is set up for a repeat PFTs and Chest Xray in the coming  months.       Review of Systems:   Pertinent items are noted in HPI or as below, remainder of complete ROS is negative.    No fevers/chills/sweats  No recent problems with hearing or vision. No swallowing problems.   No breathing difficulty, shortness of breath, coughing, or wheezing  No chest pain or palpitations  No heart burn, indigestion, abdominal pain, nausea, vomiting, diarrhea  No urination problems, no bloody, cloudy urine, no dysuria  No numbing, tingling, weakness  No headaches or confusion  No rashes. No easy bleeding or bruising.       Active Medications:   Current Outpatient Medications   Medication Sig Dispense Refill    hydroxychloroquine (PLAQUENIL) 200 MG tablet TAKE 1 TABLET(200 MG) BY MOUTH TWICE DAILY 60 tablet 7    acetaminophen (TYLENOL) 325 MG tablet Take 2 tablets (650 mg) by mouth every 6 hours as needed for mild pain or fever      albuterol (PROAIR HFA/PROVENTIL HFA/VENTOLIN HFA) 108 (90 Base) MCG/ACT inhaler Inhale 2 puffs into the lungs every 6 hours as needed for shortness of breath, wheezing or cough. 18 g 3    ARIPiprazole (ABILIFY) 20 MG tablet Take 20 mg by mouth daily.      empagliflozin (JARDIANCE) 10 MG TABS tablet Take 1 tablet (10 mg) by mouth daily. 90 tablet 3    Fluticasone-Umeclidin-Vilant (TRELEGY ELLIPTA) 100-62.5-25 MCG/ACT oral inhaler Inhale 1 puff into the lungs daily. 1 each 11    furosemide (LASIX) 20 MG tablet Take 2 tablets (40 mg) by mouth daily as needed (for leg edema or weight gain >2 lbs in 24 hours).      guaiFENesin (MUCINEX) 600 MG 12 hr tablet Take 1 tablet (600 mg) by mouth 2 times daily as needed for congestion or cough. 30 tablet 0    guaiFENesin-dextromethorphan (ROBITUSSIN DM) 100-10 MG/5ML syrup Take 10 mLs by mouth 4 times daily as needed for cough. 236 mL 0    lisinopril (ZESTRIL) 5 MG tablet Take 1 tablet (5 mg) by mouth daily. (Hold until repeat BMP with PCP follow up)      miconazole (MICATIN) 2 % external powder Apply topically 2 times daily.  Apply to rash under abdominal folds. 43 g 0    multivitamin w/minerals (THERA-VIT-M) tablet Take 1 tablet by mouth daily 100 tablet 1    nystatin (MYCOSTATIN) 704974 UNIT/GM external powder Apply topically 4 times daily as needed 60 g 1    Respiratory Therapy Supplies (AEROBIKA) YASMANI 1 each 3 times daily. 1 each 2    senna-docusate (SENOKOT-S/PERICOLACE) 8.6-50 MG tablet Take 1 tablet by mouth 2 times daily as needed for constipation 180 tablet 1    venlafaxine (EFFEXOR XR) 150 MG 24 hr capsule Take 150 mg by mouth daily.      VITAMIN D3 25 MCG (1000 UT) tablet Take 50 mcg by mouth daily.      warfarin ANTICOAGULANT (COUMADIN) 5 MG tablet Take 2 tablets (10mg) daily or as directed by Anticoagulation Clinic-adjusted per INR results 180 tablet 1     No current facility-administered medications for this visit.     Facility-Administered Medications Ordered in Other Visits   Medication Dose Route Frequency Provider Last Rate Last Admin    sodium chloride (PF) 0.9% PF flush 10 mL  10 mL Intracatheter Once Loy Joshi APRN CNP         Stopped lipitor       Allergies:   Adhesive tape and Erythromycin      Past Medical History:  Depression   Eczema   Hyperlipidemia   Hypertension   Morbid obesity   Osteoarthrosis right knee   Sleep apnea   Tobacco use disorder   Primary localized osteoarthrosis, pelvic region and thigh  Degeneration of cervical intervertebral disc  Chronic bronchitis   Pulmonary embolism   Cardiac arrest; required ECMO 9-2019     Past Surgical History:  Left total knee arthoplasty 6/14/2012   Right hip arthroplasty 07/09/2004      Family History:   Mother: thyroid disease, hypertension, skin cancer, CVA  Paternal grandmother: breast cancer, pancreatic cancer   Father: alcoholism   Sister: thyroid disease, alcoholism   Maternal grandmother: hypertension   Sister: heart disease, multiple ablations   Paternal grandfather: prostate cancer      Social History:   Current everyday cigarette smoker, 1  "pack/day, 33 years, started 1982  No smokeless tobacco use  Occasional alcohol use   Physical Exam:   /68   Pulse 84   Ht 1.676 m (5' 6\")   Wt (!) 143.3 kg (316 lb)   BMI 51.00 kg/m     Wt Readings from Last 4 Encounters:   04/24/25 (!) 143.3 kg (316 lb)   04/19/25 135.4 kg (298 lb 8.1 oz)   06/13/24 130.1 kg (286 lb 14.4 oz)   10/26/23 116.6 kg (257 lb)     Constitutional: Well-developed, appearing stated age; cooperative, speaking in full sentences not using accessory muscles of respiration  Eyes: Normal EOM, PERRLA, vision, conjunctiva, sclera  ENT: Normal external ears, nose, hearing, lips, teeth, gums, throat. No mucous membrane lesions, normal saliva pool  Neck: No mass or thyroid enlargement  MS: Reduced fist formation; thenar wasting right greater than left hands.  Skin: No nail pitting, alopecia, nodules or lesions.    Neuro: Normal cranial nerves  Psych: Normal judgement, orientation, memory, affect.     Laboratory:       Latest Ref Rng & Units 4/17/2025     6:42 AM 4/18/2025     6:28 AM 4/19/2025     6:53 AM   RHEUM RESULTS   Creatinine 0.51 - 0.95 mg/dL 1.66  1.69  1.72    GFR Estimate >60 mL/min/1.73m2 34  34  33    Hemoglobin 11.7 - 15.7 g/dL  11.2  11.4        Rheumatoid Factor   Date Value Ref Range Status   08/28/2017 51 (H) <20 IU/mL Final     Cyclic Citrullinated Peptide Antibody, IgG   Date Value Ref Range Status   08/28/2017 15 (H) <7 U/mL Final     Comment:     Positive     EMERY Screen by EIA   Date Value Ref Range Status   12/30/2016 <1.0  Interpretation:  Negative   <1.0 Final     Hepatitis B Core Caron   Date Value Ref Range Status   04/12/2017 Nonreactive NR Final     Hep B Surface Agn   Date Value Ref Range Status   04/12/2017 Nonreactive NR Final     IGG   Date Value Ref Range Status   04/21/2017 940 695 - 1,620 mg/dL Final     IGA   Date Value Ref Range Status   04/21/2017 271 70 - 380 mg/dL Final     IGM   Date Value Ref Range Status   04/21/2017 395 (H) 60 - 265 mg/dL Final " "    Chief Complaint   Patient presents with    RECHECK     Blood pressure 112/68, pulse 84, height 1.676 m (5' 6\"), weight (!) 143.3 kg (316 lb), not currently breastfeeding.  Patient was last seen  "

## 2025-04-24 NOTE — PROGRESS NOTES
Type of Form Received: [facility] [id number]    Form Received (Date) 4/24/25   Form Filled out No   Placed in provider folder Yes

## 2025-04-25 ENCOUNTER — MEDICAL CORRESPONDENCE (OUTPATIENT)
Dept: HEALTH INFORMATION MANAGEMENT | Facility: CLINIC | Age: 65
End: 2025-04-25

## 2025-04-25 ENCOUNTER — LAB REQUISITION (OUTPATIENT)
Dept: LAB | Facility: CLINIC | Age: 65
End: 2025-04-25
Payer: COMMERCIAL

## 2025-04-25 DIAGNOSIS — I13.0 HYPERTENSIVE HEART AND CHRONIC KIDNEY DISEASE WITH HEART FAILURE AND STAGE 1 THROUGH STAGE 4 CHRONIC KIDNEY DISEASE, OR UNSPECIFIED CHRONIC KIDNEY DISEASE (H): ICD-10-CM

## 2025-04-25 LAB
ANION GAP SERPL CALCULATED.3IONS-SCNC: 11 MMOL/L (ref 7–15)
BUN SERPL-MCNC: 27.1 MG/DL (ref 8–23)
CALCIUM SERPL-MCNC: 9.3 MG/DL (ref 8.8–10.4)
CHLORIDE SERPL-SCNC: 105 MMOL/L (ref 98–107)
CREAT SERPL-MCNC: 1.36 MG/DL (ref 0.51–0.95)
EGFRCR SERPLBLD CKD-EPI 2021: 43 ML/MIN/1.73M2
GLUCOSE SERPL-MCNC: 89 MG/DL (ref 70–99)
HCO3 SERPL-SCNC: 24 MMOL/L (ref 22–29)
POTASSIUM SERPL-SCNC: 4.8 MMOL/L (ref 3.4–5.3)
SODIUM SERPL-SCNC: 140 MMOL/L (ref 135–145)

## 2025-04-25 PROCEDURE — 80048 BASIC METABOLIC PNL TOTAL CA: CPT | Mod: ORL

## 2025-04-25 NOTE — PATIENT INSTRUCTIONS
Diagnosis:  1.  Seropositive rheumatoid arthritis: Joint symptoms are stable/improved however recent hospitalization for no pleural effusions raises possible involvement of rheumatoid inflammation with the pleural disease.    Plan:  1.  Continue hydroxychloroquine 400 mg once daily.  Undergo annual ophthalmology evaluations for rare retinal Plaquenil toxicity.  2.  To address both joint symptoms, and to inflammation at the lung lining, restart upadacitinib (Rinvoq) 15 mg once daily.  While using Rinvoq, undergo every 3-month laboratory testing for liver function kidney function, blood counts.  3.  Follow-up with Pulmonology regarding monitoring of pleural effusion and lung function.

## 2025-04-27 RX ORDER — UPADACITINIB 15 MG/1
15 TABLET, EXTENDED RELEASE ORAL DAILY
Qty: 30 TABLET | Refills: 3 | OUTPATIENT
Start: 2025-04-27 | End: 2025-05-01

## 2025-04-28 ENCOUNTER — DOCUMENTATION ONLY (OUTPATIENT)
Dept: INTERNAL MEDICINE | Facility: CLINIC | Age: 65
End: 2025-04-28
Payer: COMMERCIAL

## 2025-04-28 ENCOUNTER — LAB REQUISITION (OUTPATIENT)
Dept: LAB | Facility: CLINIC | Age: 65
End: 2025-04-28
Payer: COMMERCIAL

## 2025-04-28 ENCOUNTER — TELEPHONE (OUTPATIENT)
Dept: RHEUMATOLOGY | Facility: CLINIC | Age: 65
End: 2025-04-28

## 2025-04-28 ENCOUNTER — ANTICOAGULATION THERAPY VISIT (OUTPATIENT)
Dept: ANTICOAGULATION | Facility: CLINIC | Age: 65
End: 2025-04-28
Payer: COMMERCIAL

## 2025-04-28 DIAGNOSIS — Z79.01 LONG TERM CURRENT USE OF ANTICOAGULANT THERAPY: ICD-10-CM

## 2025-04-28 DIAGNOSIS — M06.00 RHEUMATOID ARTHRITIS WITHOUT RHEUMATOID FACTOR, UNSPECIFIED SITE (H): ICD-10-CM

## 2025-04-28 DIAGNOSIS — I26.99 PULMONARY EMBOLI (H): Primary | ICD-10-CM

## 2025-04-28 LAB
CRP SERPL-MCNC: 31.95 MG/L
ERYTHROCYTE [DISTWIDTH] IN BLOOD BY AUTOMATED COUNT: 13.3 % (ref 10–15)
ERYTHROCYTE [SEDIMENTATION RATE] IN BLOOD BY WESTERGREN METHOD: 91 MM/HR (ref 0–30)
HCT VFR BLD AUTO: 35.6 % (ref 35–47)
HGB BLD-MCNC: 11.5 G/DL (ref 11.7–15.7)
INR (EXTERNAL): 2.9
MCH RBC QN AUTO: 28.7 PG (ref 26.5–33)
MCHC RBC AUTO-ENTMCNC: 32.3 G/DL (ref 31.5–36.5)
MCV RBC AUTO: 89 FL (ref 78–100)
PLATELET # BLD AUTO: 177 10E3/UL (ref 150–450)
RBC # BLD AUTO: 4.01 10E6/UL (ref 3.8–5.2)
WBC # BLD AUTO: 5.1 10E3/UL (ref 4–11)

## 2025-04-28 PROCEDURE — 86140 C-REACTIVE PROTEIN: CPT | Mod: ORL

## 2025-04-28 PROCEDURE — 85652 RBC SED RATE AUTOMATED: CPT | Mod: ORL

## 2025-04-28 PROCEDURE — 85027 COMPLETE CBC AUTOMATED: CPT | Mod: ORL

## 2025-04-28 NOTE — TELEPHONE ENCOUNTER
Sorry about the confusion.  After I conferred with the pulmonologist following the clinic visit, we determined that inflammation related to rheumatoid arthritis could be a cause of the recent pleural effusions and hospitalization.  In order to treat the inflammation around the lungs, I recommend recommend restarting Rinvoq.

## 2025-04-28 NOTE — PROGRESS NOTES
ANTICOAGULATION MANAGEMENT     Shira Rodriguez 65 year old female is on warfarin with therapeutic INR result. (Goal INR 2.0-3.0)    Recent labs: (last 7 days)     04/28/25  1235   INR 2.9       ASSESSMENT     Source(s): Chart Review, Patient/Caregiver Call, and Home Care/Facility Nurse     Warfarin doses taken: Warfarin taken as instructed  Diet: No new diet changes identified  Medication/supplement changes: None noted  New illness, injury, or hospitalization: Yes: 4/15-4/19 for pleural effusion   Signs or symptoms of bleeding or clotting: No  Previous result: Therapeutic last visit; previously outside of goal range  Additional findings: Shira is due to receive more warfarin in the next 7 days and is at the top end of her goal range. Small maintenance dose decrease agreed upon today with a recheck in 1 week.        PLAN     Recommended plan for ongoing change(s) affecting INR     Dosing Instructions: decrease your warfarin dose (3.6% change) with next INR in 1 week       Summary  As of 4/28/2025      Full warfarin instructions:  7.5 mg every Mon; 10 mg all other days   Next INR check:  5/5/2025               Telephone call with Shira and ROSA Carter who agrees to plan and repeated back plan correctly    Orders given to  Homecare nurse/facility to recheck    Education provided: Goal range and lab monitoring: goal range and significance of current result and Importance of following up at instructed interval  Symptom monitoring: monitoring for bleeding signs and symptoms  Contact [phone] with any changes, questions or concerns.     Plan made per [facility] anticoagulation protocol    YUNIER DAVIDSON RN  4/28/2025  Anticoagulation Clinic  linkedFA for routing messages: [facility]  [facility] patient phone line: [phone]        _______________________________________________________________________     Anticoagulation Episode Summary       Current INR goal:  2.0-3.0   TTR:  65.1% (11.9 mo)   Target end date:   Indefinite   Send INR reminders to:  [facility]    Indications    Pulmonary emboli (H) [I26.99]  Long term current use of anticoagulant therapy [Z79.01]             Comments:  --             Anticoagulation Care Providers       Provider Role Specialty Phone number    Anjel Busby MD Referring Family Medicine [phone]

## 2025-04-28 NOTE — TELEPHONE ENCOUNTER
I spoke with patient this morning regarding the Rinvoq. A new prescription was sent and MD notes states patient will be restarting.  When MTM reached out to patient, she stated that she is no longer taking medication.  After looking at pharmacy notes, patient is approved for FPAP and cost is not a barrier.  I wanted to make sure that patient understood cost was not a barrrier and that she understood her OOP would be zero.  She seemed confused if provider would like her to restart the Rinvoq or not. She stated originally it was the cost issue but now she doesn't know if she is supposed to be on med.   I told her the [facility] pharmacist would reach out to her to clarify her medications but she would like to speak with provider to confirm.

## 2025-04-28 NOTE — PROGRESS NOTES
Type of Form Received: [facility] [id number], [id number]    Form Received (Date) 4/25/25   Form Filled out No   Placed in provider folder Yes      Verbal/written post procedure instructions were given to patient/caregiver.

## 2025-04-29 ENCOUNTER — TELEPHONE (OUTPATIENT)
Dept: PHARMACY | Facility: CLINIC | Age: 65
End: 2025-04-29
Payer: COMMERCIAL

## 2025-04-29 ENCOUNTER — DOCUMENTATION ONLY (OUTPATIENT)
Dept: INTERNAL MEDICINE | Facility: CLINIC | Age: 65
End: 2025-04-29
Payer: COMMERCIAL

## 2025-04-29 ENCOUNTER — MEDICAL CORRESPONDENCE (OUTPATIENT)
Dept: HEALTH INFORMATION MANAGEMENT | Facility: CLINIC | Age: 65
End: 2025-04-29
Payer: COMMERCIAL

## 2025-04-29 NOTE — PROGRESS NOTES
Type of Form Received: [facility] [id number]    Form Received (Date) 4/28/25   Form Filled out Yes, faxed 5/1/25   Placed in provider folder Yes

## 2025-04-29 NOTE — TELEPHONE ENCOUNTER
Pt LVM reporting Dr. Austin wants her to restart Rinvoq, and she was interested in getting an MTM follow up scheduled.     Called pt back on 4/29/25 to initiate scheduling    Outcome: LVM

## 2025-04-30 ENCOUNTER — DOCUMENTATION ONLY (OUTPATIENT)
Dept: INTERNAL MEDICINE | Facility: CLINIC | Age: 65
End: 2025-04-30
Payer: COMMERCIAL

## 2025-04-30 ENCOUNTER — MEDICAL CORRESPONDENCE (OUTPATIENT)
Dept: HEALTH INFORMATION MANAGEMENT | Facility: CLINIC | Age: 65
End: 2025-04-30
Payer: COMMERCIAL

## 2025-04-30 NOTE — PROGRESS NOTES
Type of Form Received: [facility] [id number]    Form Received (Date) 4/29/25   Form Filled out No   Placed in provider folder Yes      Yes

## 2025-05-01 ENCOUNTER — VIRTUAL VISIT (OUTPATIENT)
Dept: PHARMACY | Facility: CLINIC | Age: 65
End: 2025-05-01
Attending: INTERNAL MEDICINE
Payer: COMMERCIAL

## 2025-05-01 DIAGNOSIS — Z71.85 VACCINE COUNSELING: Primary | ICD-10-CM

## 2025-05-01 DIAGNOSIS — M06.00 RHEUMATOID ARTHRITIS WITH NEGATIVE RHEUMATOID FACTOR, INVOLVING UNSPECIFIED SITE (H): ICD-10-CM

## 2025-05-01 RX ORDER — UPADACITINIB 15 MG/1
15 TABLET, EXTENDED RELEASE ORAL DAILY
Qty: 30 TABLET | Refills: 3 | OUTPATIENT
Start: 2025-05-01

## 2025-05-01 NOTE — PROGRESS NOTES
Medication Therapy Management (MTM) Encounter    ASSESSMENT:                            Medication Adherence/Access: Patient has now been approved for FPAP so cost should no longer be a concern for Rinvoq.    Rheumatoid arthritis: Patient would benefit from restarting Rinvoq to control worsening RA symptoms and potentially reduce pulmonary inflammation which may have contributed to recent hospitalization for pleural effusion. Reviewed adherence and cost with her today. Note she is due for her annual eye exam.    Vaccine counseling: Patient is up to date on recommended vaccines per ACIP recommendations.     PLAN:                            Restart Rinvoq 15 mg daily. Report any significant side effects, infections, or planned surgeries to rheumatology.  Continue hydroxychloroquine 200 mg twice daily. Please call to schedule your annual eye exam.  Continue acetaminophen 650 mg every 6 hours as needed.    Follow-up: 3 months for Rinvoq restart. No follow up currently scheduled with rheumatologist.    SUBJECTIVE/OBJECTIVE:                          Shira Rodriguez is a 63 year old female called for a follow up visit from 10/21/24. She was referred to me from Everett Austin MD.    Reason for visit: Rinvoq restart/adherence.    Allergies/ADRs: Reviewed in chart  Past Medical History: Reviewed in chart  Tobacco: She reports that she quit smoking about 5 years ago. Her smoking use included cigarettes. She started smoking about 43 years ago. She has never used smokeless tobacco.  Alcohol: not assessed today    Medication Adherence/Access: Patient stopped taking Rinvoq in December 2024 due to concerns about $2000 deductible in 2025. See below for details.    Rheumatoid arthritis:  Rinvoq 15 mg daily  Hydroxychloroquine 200 mg twice daily (last eye exam was about a year ago)  Acetaminophen 650 mg every 6 hours as needed    Patient stopped taking Rinvoq in December 2024, mostly due to impending deductible of $2000 in 2025. She  "also states she was \"unsure how much benefit it was providing.\" She is open to restarting now that she has been approved for FPAP. Previously with severe pain in hands, fingers, wrists, feet, and elbows as well as significant morning stiffness. These symptoms had improved dramatically on Rinvoq. She does now have return of swelling and stiffness especially in her wrists. Also with recent hospitalization for pleural effusion which rheumatologist thinks could be linked to autoimmune induced pulmonary inflammation.    Vaccine counseling: Patient is open to receiving recommended vaccines.    Immunization History   Administered Date(s) Administered    COVID-19 12+ (MODERNA) 09/26/2024    COVID-19 12+ (Pfizer) 06/13/2024    COVID-19 Bivalent 12+ (Pfizer) 12/06/2022, 05/24/2023    COVID-19 MONOVALENT 12+ (Pfizer) 03/12/2021, 04/02/2021, 12/30/2021, 05/24/2022    COVID-19 Monovalent 12+ (Pfizer 2022) 05/24/2022    Influenza (IIV3) PF 10/04/2011, 10/03/2012, 09/24/2016, 10/16/2017    Influenza Vaccine 18-64 (Flublok) 09/29/2019, 10/12/2022    Influenza Vaccine >6 months,quad, PF 10/03/2018, 12/30/2021    Influenza, Split Virus, Trivalent, Pf (Fluzone\Fluarix) 09/26/2024    Pneumo Conj 13-V (2010&after) 08/28/2017, 05/23/2019    Pneumococcal 23 valent 12/04/2017    TDAP (Adacel,Boostrix) 07/16/2010    TDAP Vaccine (Boostrix) 08/21/2015    Zoster recombinant adjuvanted (Shingrix) 06/04/2018, 05/23/2019      Today's Vitals: There were no vitals taken for this visit.  ----------------    I spent 10 minutes with this patient today. All changes were made via collaborative practice agreement with Everett Austin MD. A copy of the visit note was provided to the patient's provider(s).    A summary of these recommendations was sent via [employer].    Mindi Murry, PharmD  Medication Therapy Management Pharmacist  [facility]     Telemedicine Visit Details  Type of service:  Telephone visit  Start Time: 1300  End " Time: 1310     Medication Therapy Recommendations  Rheumatoid arthritis (H)   1 Current Medication: upadacitinib ER (RINVOQ) 15 MG tablet   Current Medication Sig: Take 1 tablet (15 mg) by mouth daily.   Rationale: Patient prefers not to take - Adherence - Adherence   Recommendation: Provide Adherence Intervention - Recommended restart due to returning RA symptoms and recent pleural effusion   Status: Accepted per Provider   Identified Date: 5/1/2025 Completed Date: 5/1/2025

## 2025-05-01 NOTE — Clinical Note
Alviia Soliz, patient went off Rinvoq in December due to cost but looks like now approved for FPAP. Just reordered, assume she is good to call pharmacy to fill?

## 2025-05-01 NOTE — PATIENT INSTRUCTIONS
"Recommendations from today's MTM visit:                                                      Restart Rinvoq 15 mg daily. Report any significant side effects, infections, or planned surgeries to rheumatology.  Continue hydroxychloroquine 200 mg twice daily. Please call to schedule your annual eye exam.  Continue acetaminophen 650 mg every 6 hours as needed.    Follow-up: 3 months for Rinvoq restart. No follow up currently scheduled with rheumatologist.    It was great speaking with you today.  I value your experience and would be very thankful for your time in providing feedback in our clinic survey. In the next few days, you may receive an email or text message from [employer] with a link to a survey related to your  clinical pharmacist.\"     To schedule another MTM appointment, please call the clinic directly.    My Clinical Pharmacist's contact information:                                                      Please feel free to contact me with any questions or concerns you have.      Mindi uMrry, PharmD  Medication Therapy Management Pharmacist  [facility]    "

## 2025-05-02 ENCOUNTER — MEDICAL CORRESPONDENCE (OUTPATIENT)
Dept: HEALTH INFORMATION MANAGEMENT | Facility: CLINIC | Age: 65
End: 2025-05-02

## 2025-05-05 ENCOUNTER — ANTICOAGULATION THERAPY VISIT (OUTPATIENT)
Dept: ANTICOAGULATION | Facility: CLINIC | Age: 65
End: 2025-05-05
Payer: COMMERCIAL

## 2025-05-05 ENCOUNTER — TELEPHONE (OUTPATIENT)
Dept: ANTICOAGULATION | Facility: CLINIC | Age: 65
End: 2025-05-05
Payer: COMMERCIAL

## 2025-05-05 DIAGNOSIS — I26.99 PULMONARY EMBOLI (H): Primary | ICD-10-CM

## 2025-05-05 DIAGNOSIS — Z79.01 LONG TERM CURRENT USE OF ANTICOAGULANT THERAPY: ICD-10-CM

## 2025-05-05 LAB — INR (EXTERNAL): 4.4

## 2025-05-05 NOTE — TELEPHONE ENCOUNTER
Home care calling to report INR for patient. Upon chart review, patient is managed by [facility]. Gave [facility] phone number to nurse and transferred to their home care line.    Ally Dickinson RN

## 2025-05-05 NOTE — PROGRESS NOTES
ANTICOAGULATION MANAGEMENT     Shira Rodriguez 65 year old female is on warfarin with supratherapeutic INR result. (Goal INR 2.0-3.0)    Recent labs: (last 7 days)     05/05/25  0000   INR 4.4       ASSESSMENT     Source(s): Chart Review and Patient/Caregiver Call     Warfarin doses taken: More warfarin taken than planned which may be affecting INR - took 10 mg last Monday instead of 7.5 mg, but would not expect that to increase INR to 4.4.   Diet: No new diet changes identified  Medication/supplement changes: None noted  New illness, injury, or hospitalization: No  Signs or symptoms of bleeding or clotting: No  Previous result: Therapeutic last 2(+) visits  Additional findings: None       PLAN     Recommended plan for temporary change(s) affecting INR     Dosing Instructions: partial hold then decrease your warfarin dose (10.7% change from how you have been taking it) with next INR in 5-7 days       Summary  As of 5/5/2025      Full warfarin instructions:  5/5: 2.5 mg; Otherwise 7.5 mg every Mon, Wed, Fri; 10 mg all other days   Next INR check:  5/12/2025               Telephone call with [facility] home care nurse who agrees to plan and repeated back plan correctly    Orders given to  Homecare nurse/facility to recheck    Education provided: Taking warfarin: Importance of taking warfarin as instructed  Goal range and lab monitoring: goal range and significance of current result and Importance of following up at instructed interval  Symptom monitoring: monitoring for bleeding signs and symptoms    Plan made per [facility] anticoagulation protocol    Mouna Carolina RN  5/5/2025  Anticoagulation Clinic  [place] for routing messages: [facility]  [facility] patient phone line: [phone]        _______________________________________________________________________     Anticoagulation Episode Summary       Current INR goal:  2.0-3.0   TTR:  65.3% (11.9 mo)   Target end date:  Indefinite   Send INR reminders to:  [facility]    Indications    Pulmonary emboli (H) [I26.99]  Long term current use of anticoagulant therapy [Z79.01]             Comments:  --             Anticoagulation Care Providers       Provider Role Specialty Phone number    Anjel Busby MD Referring Family Medicine [phone]

## 2025-05-06 ENCOUNTER — DOCUMENTATION ONLY (OUTPATIENT)
Dept: INTERNAL MEDICINE | Facility: CLINIC | Age: 65
End: 2025-05-06
Payer: COMMERCIAL

## 2025-05-06 NOTE — PROGRESS NOTES
Type of Form Received: [facility] [id number]    Form Received (Date) 5/6/25   Form Filled out No   Placed in provider folder Yes

## 2025-05-07 ENCOUNTER — MEDICAL CORRESPONDENCE (OUTPATIENT)
Dept: HEALTH INFORMATION MANAGEMENT | Facility: CLINIC | Age: 65
End: 2025-05-07
Payer: COMMERCIAL

## 2025-05-12 ENCOUNTER — ANTICOAGULATION THERAPY VISIT (OUTPATIENT)
Dept: ANTICOAGULATION | Facility: CLINIC | Age: 65
End: 2025-05-12
Payer: COMMERCIAL

## 2025-05-12 DIAGNOSIS — Z79.01 LONG TERM CURRENT USE OF ANTICOAGULANT THERAPY: ICD-10-CM

## 2025-05-12 DIAGNOSIS — I26.99 PULMONARY EMBOLI (H): Primary | ICD-10-CM

## 2025-05-12 LAB — INR (EXTERNAL): 2.5

## 2025-05-12 NOTE — PROGRESS NOTES
ANTICOAGULATION MANAGEMENT     Shira Rodriguez 65 year old female is on warfarin with therapeutic INR result. (Goal INR 2.0-3.0)    Recent labs: (last 7 days)     05/12/25  1148   INR 2.5       ASSESSMENT     Source(s): Chart Review, Patient/Caregiver Call, and Home Care/Facility Nurse     Warfarin doses taken: Warfarin taken as instructed  Diet: No new diet changes identified  Medication/supplement changes: Rinvoq started on Thursday-no drug drug interaction with warfarin.  New illness, injury, or hospitalization: No  Signs or symptoms of bleeding or clotting: No  Previous result: Supratherapeutic-3.6% maintenance dose decrease  Additional findings: Due to receive more warfarin in the next 7 days than last 7 days-maintenance dose decreased a small amount again       PLAN     Recommended plan for temporary change(s) and ongoing change(s) affecting INR     Dosing Instructions: decrease your warfarin dose (4% change) with next INR in 1 week       Summary  As of 5/12/2025      Full warfarin instructions:  10 mg every Mon, Thu, Sat; 7.5 mg all other days   Next INR check:  5/19/2025               Telephone call with [facility] home care nurse who agrees to plan and repeated back plan correctly    Orders given to  Homecare nurse/facility to recheck    Education provided: Goal range and lab monitoring: goal range and significance of current result, Importance of therapeutic range, and Importance of following up at instructed interval  Interaction IS NOT anticipated between warfarin and Rinvoq  Contact [phone] with any changes, questions or concerns.     Plan made per [facility] anticoagulation protocol    YUNIER DAVIDSON RN  5/12/2025  Anticoagulation Clinic  [employer] for routing messages: [facility]  [facility] patient phone line: [phone]        _______________________________________________________________________     Anticoagulation Episode Summary       Current INR goal:  2.0-3.0   TTR:  65.8% (11.9 mo)   Target end  date:  Indefinite   Send INR reminders to:  [facility]    Indications    Pulmonary emboli (H) [I26.99]  Long term current use of anticoagulant therapy [Z79.01]             Comments:  --             Anticoagulation Care Providers       Provider Role Specialty Phone number    Anjel Busby MD Referring Family Medicine [phone]

## 2025-05-19 ENCOUNTER — ANTICOAGULATION THERAPY VISIT (OUTPATIENT)
Dept: ANTICOAGULATION | Facility: CLINIC | Age: 65
End: 2025-05-19
Payer: COMMERCIAL

## 2025-05-19 ENCOUNTER — MEDICAL CORRESPONDENCE (OUTPATIENT)
Dept: HEALTH INFORMATION MANAGEMENT | Facility: CLINIC | Age: 65
End: 2025-05-19
Payer: COMMERCIAL

## 2025-05-19 ENCOUNTER — TELEPHONE (OUTPATIENT)
Dept: ANTICOAGULATION | Facility: CLINIC | Age: 65
End: 2025-05-19

## 2025-05-19 DIAGNOSIS — I26.99 PULMONARY EMBOLI (H): Primary | ICD-10-CM

## 2025-05-19 DIAGNOSIS — Z79.01 LONG TERM CURRENT USE OF ANTICOAGULANT THERAPY: ICD-10-CM

## 2025-05-19 LAB — INR (EXTERNAL): 2 (ref 0.9–1.1)

## 2025-05-19 NOTE — PROGRESS NOTES
ANTICOAGULATION MANAGEMENT     Shira Rodriguez 65 year old female is on warfarin with therapeutic INR result. (Goal INR 2.0-3.0)    Recent labs: (last 7 days)     05/19/25  1023   INR 2.0*       ASSESSMENT     Source(s): Chart Review, Patient/Caregiver Call, and Home Care/Facility Nurse     Warfarin doses taken: Warfarin taken as instructed  Diet: No new diet changes identified  Medication/supplement changes: None noted  New illness, injury, or hospitalization: No  Signs or symptoms of bleeding or clotting: No  Previous result: Therapeutic last visit; previously outside of goal range  Additional findings: dose was premptively reduced last week, will adjust dose back to same weekly dose as patient was closer to middle of goal range.  Patient will be getting discharged from home care in the next couple of weeks and would like to get a home monitor. Will send a message to the [facility].        PLAN     Recommended plan for no diet, medication or health factor changes affecting INR     Dosing Instructions: Increase your warfarin dose (4.2% change) with next INR in 1 week       Summary  As of 5/19/2025      Full warfarin instructions:  7.5 mg every Sun, Wed, Fri; 10 mg all other days   Next INR check:  5/27/2025               Telephone call with [place] home care nurse who agrees to plan and repeated back plan correctly    Orders given to  Homecare nurse/facility to recheck    Education provided: Please call back if any changes to your diet, medications or how you've been taking warfarin  Information on home INR monitoring    Plan made per [facility] anticoagulation protocol    Brittany Graves, RN  5/19/2025  Anticoagulation Clinic  [place] for routing messages: [facility]  [facility] patient phone line: [phone]        _______________________________________________________________________     Anticoagulation Episode Summary       Current INR goal:  2.0-3.0   TTR:  67.7% (11.9 mo)   Target end date:  Indefinite    Send INR reminders to:  [facility]    Indications    Pulmonary emboli (H) [I26.99]  Long term current use of anticoagulant therapy [Z79.01]             Comments:  --             Anticoagulation Care Providers       Provider Role Specialty Phone number    Anjel Busby MD Referring Family Medicine [phone]

## 2025-05-19 NOTE — TELEPHONE ENCOUNTER
Home care nurse states patient manuela be  discharging from home care services in the next few weeks and patient would like to possibly get a home monitor.  Please follow up with patient.    KRANTHI JacobsN, RN  Anticoagulation Clinic

## 2025-05-20 NOTE — TELEPHONE ENCOUNTER
HOME INR MONITORING REQUEST    REQUEST TYPE: NEW    ELIGIBILITY:    Current plan for long term/indefinite anticoagulation: Yes     At least 3 months of warfarin therapy may be required prior to receiving meter (order may be started in advance)  [facility] episode start date: 10/11/2019  Episode start date within last 90 days: No    Insurance approved indication for anticoagulation required to have a home monitor covered; not all indications for warfarin are currently covered     Shira with commonly covered diagnoses: Z86.718   Personal history of other venous thrombosis and embolism      PROGRAM REQUIREMENTS:     Patient or caregiver must agree to the below terms and testing requirements for home INR monitor order to be placed    Patient or designated caregiver have skills necessary to perform the self test: Yes    Testing Requirements: Yes  Every 1 week testing is required by many insurance companies including [employer]; some companies may allow every 2 weeks, but not longer.  Testing should be performed during [facility] business days (M-F)  In clinic lab visits may be periodically required if a home result is an error, INR  > 8, or testing supplies are not available.    Primary coverage: [facility] MEDICARE ADVANTAGE  Secondary coverage:     Must use a [employer] approved service provider: Yes   Home meters, testing supplies, meter training, and reporting of INR results done through the approved outside company.   [employer] will continue to receive and manage INR results.    Patient/Caregiver will be contacted by home monitoring company to review insurance coverage with home monitoring company prior to enrolling. Please watch for call or voicemail from 1-800 number from [employer], or [employer]    Best person to contact to discuss coverage: Patient at Home number on file [phone] (home)     Home monitoring application often takes 4-6 weeks. Patient should  continue to follow up with recommended INR monitoring in clinic until receives monitor and training completed: Yes    ___________________________________________________________________________________________________________________________    Patient/Caregiver agree to all of the above terms, request routed to [facility] coordinator to obtain provider orders    [facility] Referring provider: Dr. Busby    [facility]

## 2025-05-21 ENCOUNTER — DOCUMENTATION ONLY (OUTPATIENT)
Dept: INTERNAL MEDICINE | Facility: CLINIC | Age: 65
End: 2025-05-21
Payer: COMMERCIAL

## 2025-05-21 NOTE — PROGRESS NOTES
Type of Form Received: [facility] [id number]    Form Received (Date) 5/20/25   Form Filled out No   Placed in provider folder Yes

## 2025-05-22 ENCOUNTER — TELEPHONE (OUTPATIENT)
Dept: CARDIOLOGY | Facility: CLINIC | Age: 65
End: 2025-05-22
Payer: COMMERCIAL

## 2025-05-22 DIAGNOSIS — I50.9 ACUTE ON CHRONIC CONGESTIVE HEART FAILURE, UNSPECIFIED HEART FAILURE TYPE (H): Primary | ICD-10-CM

## 2025-05-22 NOTE — TELEPHONE ENCOUNTER
Patient has Medicare Advantage through [facility].    Jardiance/Farxiga: $0 for the remainder of 2025.  Entresto: $0 for the remainder of 2025.     Karina Cruz  Pharmacy Technician/Liaison, Discharge Pharmacy   [phone] (voice or text)  [email or web address]  Pharmacy test claims are estimates and may not reflect final costs.  Suggested alternatives aim to be cost-effective and may not be therapeutically equivalent as this consult is informational and does not constitute medical advice.  Clinical decisions should be made by qualified healthcare providers.

## 2025-05-22 NOTE — TELEPHONE ENCOUNTER
Pt is scheduled for a [facility] appt on 5/23/25 with Dr. Cadena     Pt with a history of HFpEF.  Medication list reviewed and pt is not currently on Entresto, or Farxiga.     Please initiate coverage check for the above medications.  Terrence NEWTON([facility])           Statement Selected

## 2025-05-23 ENCOUNTER — MEDICAL CORRESPONDENCE (OUTPATIENT)
Dept: HEALTH INFORMATION MANAGEMENT | Facility: CLINIC | Age: 65
End: 2025-05-23

## 2025-05-23 ENCOUNTER — RESULTS FOLLOW-UP (OUTPATIENT)
Dept: RHEUMATOLOGY | Facility: CLINIC | Age: 65
End: 2025-05-23

## 2025-05-27 ENCOUNTER — ANTICOAGULATION THERAPY VISIT (OUTPATIENT)
Dept: ANTICOAGULATION | Facility: CLINIC | Age: 65
End: 2025-05-27
Payer: COMMERCIAL

## 2025-05-27 DIAGNOSIS — I26.99 PULMONARY EMBOLI (H): Primary | ICD-10-CM

## 2025-05-27 DIAGNOSIS — Z79.01 LONG TERM CURRENT USE OF ANTICOAGULANT THERAPY: ICD-10-CM

## 2025-05-27 LAB — INR (EXTERNAL): 1.8

## 2025-05-27 NOTE — PROGRESS NOTES
ANTICOAGULATION MANAGEMENT     Shira Rodriguez 65 year old female is on warfarin with subtherapeutic INR result. (Goal INR 2.0-3.0)    Recent labs: (last 7 days)     05/27/25  0000   INR 1.8       ASSESSMENT     Source(s): Chart Review and Home Care/Facility Nurse     Warfarin doses taken: Warfarin taken as instructed  Diet: No new diet changes identified  Medication/supplement changes: None noted  New illness, injury, or hospitalization: No  Signs or symptoms of bleeding or clotting: No  Previous result: Therapeutic last 2(+) visits  Additional findings: Next home care visit 6/4       PLAN     Recommended plan for no diet, medication or health factor changes affecting INR     Dosing Instructions: Increase your warfarin dose (4% change) with next INR in 1 week       Summary  As of 5/27/2025      Full warfarin instructions:  7.5 mg every Mon, Thu; 10 mg all other days   Next INR check:  6/4/2025               Telephone call with [place] home care nurse who verbalizes understanding and agrees to plan    Orders given to  Homecare nurse/facility to recheck    Education provided: Contact [phone] with any changes, questions or concerns.     Plan made per [facility] anticoagulation protocol    Karen Gary RN  5/27/2025  Anticoagulation Clinic  [employer] for routing messages: p  [facility] phone [facility]: [phone]        _______________________________________________________________________     Anticoagulation Episode Summary       Current INR goal:  2.0-3.0   TTR:  67.8% (11.9 mo)   Target end date:  Indefinite   Send INR reminders to:   [facility]    Indications    Pulmonary emboli (H) [I26.99]  Long term current use of anticoagulant therapy [Z79.01]             Comments:  --             Anticoagulation Care Providers       Provider Role Specialty Phone number    Anjel Busby MD Referring Family Medicine [phone]

## 2025-05-29 ENCOUNTER — DOCUMENTATION ONLY (OUTPATIENT)
Dept: INTERNAL MEDICINE | Facility: CLINIC | Age: 65
End: 2025-05-29
Payer: COMMERCIAL

## 2025-05-29 NOTE — PROGRESS NOTES
Type of Form Received: [facility] [id number]    Form Received (Date) 5/28/25   Form Filled out No   Placed in provider folder Yes

## 2025-05-30 ENCOUNTER — MEDICAL CORRESPONDENCE (OUTPATIENT)
Dept: HEALTH INFORMATION MANAGEMENT | Facility: CLINIC | Age: 65
End: 2025-05-30
Payer: COMMERCIAL

## 2025-06-03 ENCOUNTER — RESULTS FOLLOW-UP (OUTPATIENT)
Dept: CARDIOLOGY | Facility: CLINIC | Age: 65
End: 2025-06-03

## 2025-06-03 ENCOUNTER — TELEPHONE (OUTPATIENT)
Dept: CARDIOLOGY | Facility: CLINIC | Age: 65
End: 2025-06-03
Payer: COMMERCIAL

## 2025-06-03 NOTE — TELEPHONE ENCOUNTER
Please see labs and advise, per last OV note:    I agree with previous intensivist and hospitalist that her breathing difficulties are not likely due to heart failure.  She has underlying chronic kidney disease which makes the NT BNP difficult to interpret.  I will recommend that she have this drawn as it was ordered for yesterday to compare to the NT BNP that was drawn during her hospitalization that was very modestly elevated at 2, 254.  I agree with the recommendation to use Lasix on a as needed basis for fluid retention and/or increased work of breathing with orthopneic symptoms I do not feel she needs core clinic at this time.

## 2025-06-04 ENCOUNTER — ANTICOAGULATION THERAPY VISIT (OUTPATIENT)
Dept: ANTICOAGULATION | Facility: CLINIC | Age: 65
End: 2025-06-04
Payer: COMMERCIAL

## 2025-06-04 ENCOUNTER — TELEPHONE (OUTPATIENT)
Dept: INTERNAL MEDICINE | Facility: CLINIC | Age: 65
End: 2025-06-04
Payer: COMMERCIAL

## 2025-06-04 DIAGNOSIS — Z79.01 LONG TERM CURRENT USE OF ANTICOAGULANT THERAPY: Primary | ICD-10-CM

## 2025-06-04 DIAGNOSIS — I26.99 PULMONARY EMBOLI (H): ICD-10-CM

## 2025-06-04 LAB — INR (EXTERNAL): 2.1

## 2025-06-04 NOTE — TELEPHONE ENCOUNTER
[facility] Call Center    Phone Message    May a detailed message be left on voicemail: yes     Reason for Call: Order(s): Home Care Orders: Skilled Nursinx a week for 4 weeks with a plan to discharge on the 4th week.      Action Taken: Message routed to:  [facility]    Travel Screening: Not Applicable     Date of Service:

## 2025-06-04 NOTE — PROGRESS NOTES
ANTICOAGULATION MANAGEMENT     Shira Rodriguez 65 year old female is on warfarin with therapeutic INR result. (Goal INR 2.0-3.0)    Recent labs: (last 7 days)     06/04/25  0000   INR 2.1       ASSESSMENT     Source(s): Chart Review and Patient/Caregiver Call     Warfarin doses taken: Warfarin taken as instructed  Diet: No new diet changes identified  Medication/supplement changes: None noted  New illness, injury, or hospitalization: No  Signs or symptoms of bleeding or clotting: No  Previous result: Subtherapeutic  Additional findings: Home care will be discharging patient in 4 weeks       PLAN     Recommended plan for no diet, medication or health factor changes affecting INR     Dosing Instructions: Continue your current warfarin dose with next INR in 2 weeks       Summary  As of 6/4/2025      Full warfarin instructions:  7.5 mg every Mon, Thu; 10 mg all other days   Next INR check:  6/18/2025               Telephone call with [place] home care nurse who verbalizes understanding and agrees to plan and who agrees to plan and repeated back plan correctly    Orders given to  Homecare nurse/facility to recheck    Education provided: Taking warfarin: Importance of taking warfarin as instructed  Goal range and lab monitoring: goal range and significance of current result, Importance of therapeutic range, and Importance of following up at instructed interval    Plan made per [facility] anticoagulation protocol    Mariah Sellers RN  6/4/2025  Anticoagulation Clinic  [employer] for routing messages: lizette [facility] patient phone line: [phone]        _______________________________________________________________________     Anticoagulation Episode Summary       Current INR goal:  2.0-3.0   TTR:  68.6% (11.9 mo)   Target end date:  Indefinite   Send INR reminders to:  [facility]    Indications    Pulmonary emboli (H) [I26.99]  Long term current use of anticoagulant therapy [Z79.01]             Comments:   --             Anticoagulation Care Providers       Provider Role Specialty Phone number    Anjel Busby MD Referring Family Medicine [phone]

## 2025-06-04 NOTE — TELEPHONE ENCOUNTER
Left detailed VM on confidential voicemail box for Fauzia with [facility] with the following verbal order(s): Home Care Orders: Skilled Nursinx a week for 4 weeks with a plan to discharge on the 4th week.   Aura YO LPN  [facility]

## 2025-06-04 NOTE — PROGRESS NOTES
Received a voicemail message from Fauzia tan with [employer]. Patient's INR today is 2.1.  Please call [phone] with coumadin orders.  Thank you.  Marva Restrepo, RN  Anticoagulation Nurse - [facility]

## 2025-06-09 ENCOUNTER — ANTICOAGULATION THERAPY VISIT (OUTPATIENT)
Dept: ANTICOAGULATION | Facility: CLINIC | Age: 65
End: 2025-06-09
Payer: COMMERCIAL

## 2025-06-09 ENCOUNTER — MEDICAL CORRESPONDENCE (OUTPATIENT)
Dept: HEALTH INFORMATION MANAGEMENT | Facility: CLINIC | Age: 65
End: 2025-06-09
Payer: COMMERCIAL

## 2025-06-09 ENCOUNTER — RESULTS FOLLOW-UP (OUTPATIENT)
Dept: ANTICOAGULATION | Facility: CLINIC | Age: 65
End: 2025-06-09

## 2025-06-09 DIAGNOSIS — I26.99 PULMONARY EMBOLI (H): Primary | ICD-10-CM

## 2025-06-09 DIAGNOSIS — Z79.01 LONG TERM CURRENT USE OF ANTICOAGULANT THERAPY: ICD-10-CM

## 2025-06-09 LAB — INR HOME MONITORING: 2.1 RATIO (ref 2–3)

## 2025-06-09 NOTE — PROGRESS NOTES
ANTICOAGULATION MANAGEMENT     Shira Rodriguez 65 year old female is on warfarin with therapeutic INR result. (Goal INR 2.0-3.0)    Recent labs: (last 7 days)     06/09/25  1145   INR 2.1       ASSESSMENT     Source(s): Chart Review and Patient/Caregiver Call     Warfarin doses taken: Warfarin taken as instructed  Diet: No new diet changes identified  Medication/supplement changes: None noted  New illness, injury, or hospitalization: No  Signs or symptoms of bleeding or clotting: No  Previous result: Therapeutic last visit; previously outside of goal range  Additional findings: None       PLAN     Recommended plan for no diet, medication or health factor changes affecting INR     Dosing Instructions: Continue your current warfarin dose with next INR in 1-2 weeks       Summary  As of 6/9/2025      Full warfarin instructions:  7.5 mg every Mon, Thu; 10 mg all other days   Next INR check:  6/23/2025               Telephone call with Shira who verbalizes understanding and agrees to plan    Patient to recheck with home meter    Education provided: Please call back if any changes to your diet, medications or how you've been taking warfarin    Plan made per [facility] anticoagulation protocol    Vickie Gabriel RN  6/9/2025  Anticoagulation Clinic  [employer] for routing messages: p [facility]  [facility] patient phone line: [phone]        _______________________________________________________________________     Anticoagulation Episode Summary       Current INR goal:  2.0-3.0   TTR:  69.6% (11.9 mo)   Target end date:  Indefinite   Send INR reminders to:  [facility]    Indications    Pulmonary emboli (H) [I26.99]  Long term current use of anticoagulant therapy [Z79.01]             Comments:  --             Anticoagulation Care Providers       Provider Role Specialty Phone number    Anjel Busby MD Referring Family Medicine [phone]

## 2025-06-09 NOTE — TELEPHONE ENCOUNTER
Juana Cadena, DO to Me (Selected Message)        6/8/25  3:27 PM  She should follow-up with PMD on hyperkalemia.  This is a [facility] patient btw..    [employer] message sent to patient with recommendations.  Nikia Malik RN on 6/9/2025 at 10:16 AM

## 2025-06-19 ENCOUNTER — DOCUMENTATION ONLY (OUTPATIENT)
Dept: INTERNAL MEDICINE | Facility: CLINIC | Age: 65
End: 2025-06-19
Payer: COMMERCIAL

## 2025-06-19 NOTE — PROGRESS NOTES
Type of Form Received: [facility] [id number]    Form Received (Date) 6/19/25   Form Filled out No   Placed in provider folder Yes

## 2025-06-19 NOTE — PROGRESS NOTES
Type of Form Received: [facility] HHC/POC 6/9-8/7 [id number]    Form Received (Date) 6/18/25   Form Filled out No   Placed in provider folder Yes

## 2025-06-23 ENCOUNTER — MEDICAL CORRESPONDENCE (OUTPATIENT)
Dept: HEALTH INFORMATION MANAGEMENT | Facility: CLINIC | Age: 65
End: 2025-06-23
Payer: COMMERCIAL

## 2025-06-23 ENCOUNTER — ANTICOAGULATION THERAPY VISIT (OUTPATIENT)
Dept: ANTICOAGULATION | Facility: CLINIC | Age: 65
End: 2025-06-23
Payer: COMMERCIAL

## 2025-06-23 ENCOUNTER — RESULTS FOLLOW-UP (OUTPATIENT)
Dept: ANTICOAGULATION | Facility: CLINIC | Age: 65
End: 2025-06-23

## 2025-06-23 DIAGNOSIS — Z53.9 DIAGNOSIS NOT YET DEFINED: Primary | ICD-10-CM

## 2025-06-23 DIAGNOSIS — I26.99 PULMONARY EMBOLI (H): Primary | ICD-10-CM

## 2025-06-23 DIAGNOSIS — Z79.01 LONG TERM CURRENT USE OF ANTICOAGULANT THERAPY: ICD-10-CM

## 2025-06-23 LAB — INR HOME MONITORING: 1.7 RATIO (ref 2–3)

## 2025-06-23 NOTE — PROGRESS NOTES
ANTICOAGULATION MANAGEMENT     Shira Rodriguez 65 year old female is on warfarin with subtherapeutic INR result. (Goal INR 2.0-3.0)    Recent labs: (last 7 days)     06/23/25  1211   INR 1.7*       ASSESSMENT     Source(s): Chart Review and Patient/Caregiver Call     Warfarin doses taken: Missed dose(s) may be affecting INR  Diet: No new diet changes identified  Medication/supplement changes: None noted  New illness, injury, or hospitalization: No  Signs or symptoms of bleeding or clotting: No  Previous result: Therapeutic last 2(+) visits  Additional findings: None       PLAN     Recommended plan for temporary change(s) affecting INR     Dosing Instructions: booster dose then continue your current warfarin dose with next INR in 2 weeks       Summary  As of 6/23/2025      Full warfarin instructions:  6/23: 12.5 mg; Otherwise 7.5 mg every Mon, Thu; 10 mg all other days   Next INR check:  7/7/2025               Telephone call with Shira who verbalizes understanding and agrees to plan    Patient to recheck with home meter    Education provided: Contact [phone] with any changes, questions or concerns.     Plan made per [facility] anticoagulation protocol    Rachna Rivera RN  6/23/2025  Anticoagulation Clinic  [employer] for routing messages: lizette [facility] patient phone line: [phone]        _______________________________________________________________________     Anticoagulation Episode Summary       Current INR goal:  2.0-3.0   TTR:  66.7% (11.9 mo)   Target end date:  Indefinite   Send INR reminders to:  [facility]    Indications    Pulmonary emboli (H) [I26.99]  Long term current use of anticoagulant therapy [Z79.01]             Comments:  --             Anticoagulation Care Providers       Provider Role Specialty Phone number    Anjel Busby MD Referring Family Medicine [phone]

## 2025-07-03 DIAGNOSIS — E56.9 VITAMIN DEFICIENCY: ICD-10-CM

## 2025-07-07 ENCOUNTER — ANTICOAGULATION THERAPY VISIT (OUTPATIENT)
Dept: ANTICOAGULATION | Facility: CLINIC | Age: 65
End: 2025-07-07
Payer: COMMERCIAL

## 2025-07-07 DIAGNOSIS — I26.99 PULMONARY EMBOLI (H): Primary | ICD-10-CM

## 2025-07-07 DIAGNOSIS — Z79.01 LONG TERM CURRENT USE OF ANTICOAGULANT THERAPY: ICD-10-CM

## 2025-07-07 LAB — INR HOME MONITORING: 1.9 RATIO (ref 2–3)

## 2025-07-07 NOTE — PROGRESS NOTES
ANTICOAGULATION MANAGEMENT     Shira Rodriguez 65 year old female is on warfarin with subtherapeutic INR result. (Goal INR 2.0-3.0)    Recent labs: (last 7 days)     07/07/25  1606   INR 1.9*       ASSESSMENT     Source(s): Chart Review and Patient/Caregiver Call     Warfarin doses taken: Warfarin taken as instructed  Diet: No new diet changes identified  Medication/supplement changes: None noted  New illness, injury, or hospitalization: No  Signs or symptoms of bleeding or clotting: No  Previous result: Subtherapeutic  Additional findings: None       PLAN     Recommended plan for no diet, medication or health factor changes affecting INR     Dosing Instructions: Increase your warfarin dose (3.8% change) with next INR in 1 week       Summary  As of 7/7/2025      Full warfarin instructions:  7.5 mg every Thu; 10 mg all other days   Next INR check:  7/14/2025               Telephone call with Shira who agrees to plan and repeated back plan correctly    Patient to recheck with home meter    Education provided: Goal range and lab monitoring: goal range and significance of current result and Importance of following up at instructed interval  Contact [phone] with any changes, questions or concerns.     Plan made per [facility] anticoagulation protocol    YUNIER DAVIDSON RN  7/7/2025  Anticoagulation Clinic  [employer] for routing messages: lizette [facility]  [facility] patient phone line: [phone]        _______________________________________________________________________     Anticoagulation Episode Summary       Current INR goal:  2.0-3.0   TTR:  62.7% (11.9 mo)   Target end date:  Indefinite   Send INR reminders to:  [facility]    Indications    Pulmonary emboli (H) [I26.99]  Long term current use of anticoagulant therapy [Z79.01]             Comments:  --             Anticoagulation Care Providers       Provider Role Specialty Phone number    Anjel Busby MD Referring Family Medicine [phone]

## 2025-07-08 RX ORDER — ASPIRIN 81 MG
1 TABLET, DELAYED RELEASE (ENTERIC COATED) ORAL DAILY
Qty: 100 TABLET | Refills: 2 | Status: SHIPPED | OUTPATIENT
Start: 2025-07-08

## 2025-07-08 NOTE — TELEPHONE ENCOUNTER
Last Written Prescription:  multivitamin w/minerals (THERA-VIT-M) tablet 100 tablet 1 6/13/2024     ----------------------  Last Visit Date: 4/22/25  Future Visit Date: 0  ----------------------      Refill decision:   [x] Medication refilled per  Medication Refill in Ambulatory Care  policy.          Request from pharmacy:  Requested Prescriptions   Pending Prescriptions Disp Refills    MULTIVITAMIN, THERAPEUTIC WITH MINERALS tablet [Pharmacy Med Name: THERA-M ENHANCED TABLETS] 100 tablet 1     Sig: TAKE 1 TABLET BY MOUTH DAILY       Vitamin Supplements Protocol Failed - 7/8/2025 12:29 PM        Failed - Recent (12 month) or future (90 days) visit with authorizing provider's specialty (provided they have been seen in the past 15 months)     The patient must have completed an in-person or virtual visit within the past 12 months or has a future visit scheduled within the next 90 days with the authorizing provider s specialty.  Urgent care and e-visits do not qualify as an office visit for this protocol.          Passed - The patient does not have an order for high-dose vitamin D        Passed - Medication is active on med list and the sig matches. RN to manually verify dose and sig if red X/fail.     If the protocol passes (green check), you do not need to verify med dose and sig.    A prescription matches if they are the same clinical intention.    For Example: once daily and every morning are the same.    The protocol can not identify upper and lower case letters as matching and will fail.     For Example: Take 1 tablet (50 mg) by mouth daily     TAKE 1 TABLET (50 MG) BY MOUTH DAILY    For all fails (red x), verify dose and sig.    If the refill does match what is on file, the RN can still proceed to approve the refill request.       If they do not match, route to the appropriate provider.             Passed - Medication indicated for associated diagnosis     The medication is prescribed for one or more of the  following conditions:     Vitamin deficiency   Osteopenia   Osteoporosis   Nutrition counseling   Nutrition education   Feeding ability finding   Bone density finding   Morbid Obesity   History of bypass of stomach   Idiopathic peripheral neuropathy   General examination of patient   Vitamin D deficiency   Folate deficiency anemia due to dietary causes   Sleeve resection of stomach   Rheumatoid factor level - finding   Crohn's disease   Psoriatic arthritis   Transplant of Kidney   Arthropathy   Alcoholism   Malabsorption syndrome   Disorder of bone and articular cartilage   Cystic Fibrosis  Bronchiectasis            Passed - The patient is 2 years of age or older

## 2025-07-14 ENCOUNTER — ANTICOAGULATION THERAPY VISIT (OUTPATIENT)
Dept: ANTICOAGULATION | Facility: CLINIC | Age: 65
End: 2025-07-14
Payer: COMMERCIAL

## 2025-07-14 DIAGNOSIS — Z79.01 LONG TERM CURRENT USE OF ANTICOAGULANT THERAPY: ICD-10-CM

## 2025-07-14 DIAGNOSIS — I26.99 PULMONARY EMBOLI (H): Primary | ICD-10-CM

## 2025-07-14 LAB — INR HOME MONITORING: 2.1 RATIO (ref 2–3)

## 2025-07-14 NOTE — PROGRESS NOTES
ANTICOAGULATION MANAGEMENT     Shira Rodriguez 65 year old female is on warfarin with therapeutic INR result. (Goal INR 2.0-3.0)    Recent labs: (last 7 days)     07/14/25  1641   INR 2.1       ASSESSMENT     Source(s): Chart Review and Patient/Caregiver Call     Warfarin doses taken: Warfarin taken as instructed  Diet: No new diet changes identified  Medication/supplement changes: None noted  New illness, injury, or hospitalization: No  Signs or symptoms of bleeding or clotting: No  Previous result: Subtherapeutic  Additional findings: None       PLAN     Recommended plan for no diet, medication or health factor changes affecting INR     Dosing Instructions: Continue your current warfarin dose with next INR in 1 week       Summary  As of 7/14/2025      Full warfarin instructions:  7.5 mg every Thu; 10 mg all other days   Next INR check:  7/21/2025               Telephone call with Shira who verbalizes understanding and agrees to plan    Patient to recheck with home meter    Education provided: Goal range and lab monitoring: goal range and significance of current result and Importance of therapeutic range    Plan made per [facility] anticoagulation protocol    Francis Ellis RN  7/14/2025  Anticoagulation Clinic  [employer] for routing messages: lizette [facility] patient phone line: [phone]        _______________________________________________________________________     Anticoagulation Episode Summary       Current INR goal:  2.0-3.0   TTR:  61.8% (11.9 mo)   Target end date:  Indefinite   Send INR reminders to:  [facility]    Indications    Pulmonary emboli (H) [I26.99]  Long term current use of anticoagulant therapy [Z79.01]             Comments:  --             Anticoagulation Care Providers       Provider Role Specialty Phone number    Anjel Busby MD Referring Family Medicine [phone]

## 2025-07-21 ENCOUNTER — ANTICOAGULATION THERAPY VISIT (OUTPATIENT)
Dept: ANTICOAGULATION | Facility: CLINIC | Age: 65
End: 2025-07-21
Payer: COMMERCIAL

## 2025-07-21 DIAGNOSIS — Z79.01 LONG TERM CURRENT USE OF ANTICOAGULANT THERAPY: ICD-10-CM

## 2025-07-21 DIAGNOSIS — I26.99 PULMONARY EMBOLI (H): Primary | ICD-10-CM

## 2025-07-21 LAB — INR HOME MONITORING: 2.2 RATIO (ref 2–3)

## 2025-07-21 NOTE — PROGRESS NOTES
ANTICOAGULATION MANAGEMENT     Shira Rodriguez 65 year old female is on warfarin with therapeutic INR result. (Goal INR 2.0-3.0)    Recent labs: (last 7 days)     07/21/25  1415   INR 2.2       ASSESSMENT     Source(s): Chart Review and Patient/Caregiver Call     Warfarin doses taken: Warfarin taken as instructed  Diet: No new diet changes identified  Medication/supplement changes: None noted  New illness, injury, or hospitalization: No  Signs or symptoms of bleeding or clotting: No  Previous result: Therapeutic last visit; previously outside of goal range  Additional findings: None       PLAN     Recommended plan for no diet, medication or health factor changes affecting INR     Dosing Instructions: Continue your current warfarin dose with next INR in 2 weeks       Summary  As of 7/21/2025      Full warfarin instructions:  7.5 mg every Thu; 10 mg all other days   Next INR check:  8/4/2025               Telephone call with Shira who verbalizes understanding and agrees to plan and who agrees to plan and repeated back plan correctly    Patient to recheck with home meter    Education provided: Taking warfarin: Importance of taking warfarin as instructed  Goal range and lab monitoring: goal range and significance of current result, Importance of therapeutic range, and Importance of following up at instructed interval    Plan made per [facility] anticoagulation protocol    Mariah Sellers RN  7/21/2025  Anticoagulation Clinic  [employer] for routing messages: [facility]ette [facility] patient phone line: [phone]        _______________________________________________________________________     Anticoagulation Episode Summary       Current INR goal:  2.0-3.0   TTR:  61.8% (11.9 mo)   Target end date:  Indefinite   Send INR reminders to:   [facility]    Indications    Pulmonary emboli (H) [I26.99]  Long term current use of anticoagulant therapy [Z79.01]             Comments:  --             Anticoagulation Care  Providers       Provider Role Specialty Phone number    Anjel Busby MD Referring Family Medicine [phone]

## 2025-07-22 ENCOUNTER — TELEPHONE (OUTPATIENT)
Dept: PHARMACY | Facility: CLINIC | Age: 65
End: 2025-07-22
Payer: COMMERCIAL

## 2025-07-22 NOTE — TELEPHONE ENCOUNTER
Pt returned My[facility] outreach with call to schedule MTM follow up    Outcome: pt is now scheduled for 7/23 at 2:30pm

## 2025-07-22 NOTE — PROCEDURES
James R Lachman, M.D.  Attending, Orthopaedic Surgery  Foot and Ankle  [facility]      ORTHOPAEDIC FOOT AND ANKLE CLINIC VISIT     Assessment and Plan     Assessment & Plan  Displaced fracture of fifth metatarsal bone, left foot, initial encounter for closed fracture  Acute pain of left foot    WBAT LLE in postop shoe  Recommend vit D3 and calcium supplementation  Asa 81mg bid for dvt ppx  Follow up in 6 weeks with new weightbearing left foot xrays at that visit    Orders:    Ambulatory Referral to Orthopedic Surgery    Post Op Shoe          History of Present Illness:   Chief Complaint:   Chief Complaint   Patient presents with    Right Foot - Pain     Left foot. In cam boot. Last night. Fell down a flight of stairs.      Debra River is a 56 y.o. female who is being seen for left foot pain. Patient fell down the stairs taking her dog out last night and had significant amount of left foot pain and difficulty bearing weight.  Pain is localized at distal 5th MT shaft with minimal radiating and described as sharp and severe. Patient denies numbness, tingling or radicular pain.  Denies history of neuropathy.  Patient does not smoke, does not have diabetes and does not take blood thinners.  Patient denies family history of anesthesia complications and has not had any complications with anesthesia.     Pain/symptom timing:  Worse during the day when active  Pain/symptom context:  Worse with activites and work  Pain/symptom modifying factors:  Rest makes better, activities make worse  Pain/symptom associated signs/symptoms: none    Prior treatment   NSAIDsNo   Injections No   Bracing/Orthotics Yes    Physical Therapy No     Orthopedic Surgical History:   See below    Past Medical, Surgical and Social History:  Past Medical History:  has a past medical history of Cardiac murmur, COVID (01/2022), Kidney stone, Known health problems: none, Low blood pressure, Patellofemoral pain syndrome of  Small Bowel Feeding Tube Placement Assessment  Reason for Feeding Tube Placement: Provider request for post pyloric FT  Cortrak Start Time: 1:10   Cortrak End Time: 1:25  Medicine Delivered During Procedure: Lidocaine   Placement Successful: Presume post-pyloric (pending AXR confirmation).     Procedure Complications: NA  Final Placement Timothy at exit of nare 119 cm  Face to Face time with patient: 45 minutes     Neel Hood RD, ÁLVARO, [facility]  Neuro ICU  Pager: [phone]    Unit pager: [phone]       "left knee (04/30/2021), PONV (postoperative nausea and vomiting), Shingles, Stress fracture of right fibula, and Viral meningitis.  Problem List: does not have any pertinent problems on file.  Past Surgical History:  has a past surgical history that includes Hand surgery (Left); Incisional breast biopsy (2016); Tonsillectomy; [place] tooth extraction; pr hysteroscopy bx endometrium&/polypc w/wo d&c (N/A, 11/19/2020); pr hysteroscopy endometrial ablation (N/A, 11/19/2020); pr rpr umbilical hrna 5 yrs/> reducible (N/A, 03/29/2022); Ventral hernia repair (N/A, 03/29/2022); Breast biopsy; and Tonsillectomy.  Family History: family history includes Arrhythmia in her mother; Breast cancer in an other family member; Completed Suicide  in her brother and father; Glaucoma in her father; Gout in her brother; Hypertension in her mother; No Known Problems in her daughter, daughter, maternal grandfather, maternal grandmother, paternal grandmother, and son.  Social History:  reports that she has never smoked. She has never used smokeless tobacco. She reports current alcohol use. She reports that she does not use drugs.  Current Medications: has a current medication list which includes the following prescription(s): magnesium gluconate, rosuvastatin, valacyclovir, and vitamin d3.  Allergies: has no known allergies.     Review of Systems:  General- denies fever/chills  HEENT- denies hearing loss or sore throat  Eyes- denies eye pain or visual disturbances, denies red eyes  Respiratory- denies cough or SOB  Cardio- denies chest pain or palpitations  GI- denies abdominal pain  Endocrine- denies urinary frequency  Urinary- denies pain with urination  Musculoskeletal- Negative except noted above  Skin- denies rashes or wounds  Neurological- denies dizziness or headache  Psychiatric- denies anxiety or difficulty concentrating    Physical Exam:   Ht 5' 6\" (1.676 m)   Wt 56.7 kg (125 lb)   LMP 11/18/2020   BMI 20.18 kg/m² "   General/Constitutional: No apparent distress: well-nourished and well developed.  Eyes: normal ocular motion  Cardio: RRR, Normal S1S2, No m/r/g  Lymphatic: No appreciable lymphadenopathy  Respiratory: Non-labored breathing, CTA b/l no w/c/r  Vascular: No edema, swelling or tenderness, except as noted in detailed exam.  Integumentary: No impressive skin lesions present, except as noted in detailed exam.  Neuro: No ataxia or tremors noted  Psych: Normal mood and affect, oriented to person, place and time. Appropriate affect.  Musculoskeletal: Normal, except as noted in detailed exam and in HPI.    Examination    Left    Gait Antalgic   Musculoskeletal Tender to palpation at 5th MT shaft    Skin Normal.      Nails Normal    Range of Motion  30 degrees dorsiflexion, 20 degrees plantarflexion  Subtalar motion: normal    Stability Stable    Muscle Strength 5/5 tibialis anterior  5/5 gastrocnemius-soleus  5/5 posterior tibialis  5/5 peroneal/eversion strength  5/5 EHL  5/5 FHL    Neurologic Normal    Sensation Intact to light touch throughout sural, saphenous, superficial peroneal, deep peroneal and medial/lateral plantar nerve distributions.  [place] 5.07 filament (10g) testing  deferred.    Cardiovascular Brisk capillary refill < 2 seconds,intact DP and PT pulses    Special Tests None      Imaging Studies:   3 views of the left foot were taken, reviewed and interpreted independently that demonstrate displaced slightly shortened 5th MT shaft fracture without any angulation. Reviewed by me personally.      James R. Lachman, MD  Foot & Ankle Surgery   Department of Orthopaedic Surgery  [facility]      I personally performed the service.    James R. Lachman, MD

## 2025-07-23 ENCOUNTER — VIRTUAL VISIT (OUTPATIENT)
Dept: PHARMACY | Facility: CLINIC | Age: 65
End: 2025-07-23
Attending: INTERNAL MEDICINE
Payer: COMMERCIAL

## 2025-07-23 DIAGNOSIS — M06.00 RHEUMATOID ARTHRITIS WITH NEGATIVE RHEUMATOID FACTOR, INVOLVING UNSPECIFIED SITE (H): ICD-10-CM

## 2025-07-23 DIAGNOSIS — Z71.85 VACCINE COUNSELING: Primary | ICD-10-CM

## 2025-07-23 NOTE — PROGRESS NOTES
Medication Therapy Management (MTM) Encounter    ASSESSMENT:                            Medication Adherence/Access: Patient has now been approved for FPAP and has been filling Rinvoq consistently.    Rheumatoid arthritis: Patient's symptoms are subjectively well controlled since restarting Rinvoq following enrollment in FPAP. Hydroxychloroquine eye exam is schedule for 8/2025. Provider asked that standing labs be done every 3 months, due 8/23/25. No changes needed today.    Vaccine counseling: Patient is up to date on recommended vaccines per ACIP recommendations.     PLAN:                            Please have your standing labs updated around 8/23/25.  Continue Rinvoq 15 mg daily. Report any significant side effects, infections, or planned surgeries to rheumatology.  Continue hydroxychloroquine 200 mg twice daily. Please attend eye exam in August 2025 as scheduled.  Continue acetaminophen 650 mg every 6 hours as needed.    Follow-up: 6 months. No rheumatologist follow up currently scheduled.    SUBJECTIVE/OBJECTIVE:                          Shira Rodriguez is a 63 year old female called for a follow up visit from 5/1/25. She was referred to me from Everett Austin MD.    Reason for visit: Rinvoq restart follow up.    Allergies/ADRs: Reviewed in chart  Past Medical History: Reviewed in chart  Tobacco: She reports that she quit smoking about 5 years ago. Her smoking use included cigarettes. She started smoking about 43 years ago. She has never used smokeless tobacco.  Alcohol: not assessed today    Medication Adherence/Access: Patient stopped taking Rinvoq in December 2024 due to concerns about $2000 deductible in 2025. Restarted medication 5/2025 with FPAP.    Rheumatoid arthritis:  Rinvoq 15 mg daily  Hydroxychloroquine 200 mg twice daily (eye exam scheduled in August)  Acetaminophen 650 mg every 6 hours as needed    Patient restarted Rinvoq 5/2025 after being off it due to high deductible. She is now approved  for FPAP and has not had issues filling. Pain, swelling, and morning stiffness have improved since she restarted. Shoulders have been bothering her but she does not think this is caused by the RA. Typical affected joints for her RA are hands, fingers, wrists, feet, and elbows. She has no concerns today. Eye exam for hydroxychloroquine monitoring is scheduled for August.    Labs updated 5/23/25    Vaccine counseling: Patient is open to receiving recommended vaccines.    Immunization History   Administered Date(s) Administered    COVID-19 12+ (MODERNA) 09/26/2024    COVID-19 12+ (Pfizer) 06/13/2024    COVID-19 Bivalent 12+ (Pfizer) 12/06/2022, 05/24/2023    COVID-19 MONOVALENT 12+ (Pfizer) 03/12/2021, 04/02/2021, 12/30/2021, 05/24/2022    COVID-19 Monovalent 12+ (Pfizer 2022) 05/24/2022    Influenza (IIV3) PF 10/04/2011, 10/03/2012, 09/24/2016, 10/16/2017    Influenza Vaccine 18-64 (Flublok) 09/29/2019, 10/12/2022    Influenza Vaccine >6 months,quad, PF 10/03/2018, 12/30/2021    Influenza, Split Virus, Trivalent, Pf (Fluzone\Fluarix) 09/26/2024    Pneumo Conj 13-V (2010&after) 08/28/2017, 05/23/2019    Pneumococcal 23 valent 12/04/2017    TDAP (Adacel,Boostrix) 07/16/2010    TDAP Vaccine (Boostrix) 08/21/2015    Zoster recombinant adjuvanted (Shingrix) 06/04/2018, 05/23/2019      Today's Vitals: There were no vitals taken for this visit.  ----------------    I spent 20 minutes with this patient today. All changes were made via collaborative practice agreement with Everett Austin MD. A copy of the visit note was provided to the patient's provider(s).    A summary of these recommendations was sent via [employer].    Mindi Murry, PharmD  Medication Therapy Management Pharmacist  [facility]     Telemedicine Visit Details  Type of service:  Telephone visit  Start Time: 1430  End Time: 1450  Patient location: off-site/home  Provider location: off-site/virtual     Medication Therapy Recommendations  No  medication therapy recommendations to display

## 2025-07-27 RX ORDER — UPADACITINIB 15 MG/1
15 TABLET, EXTENDED RELEASE ORAL DAILY
Qty: 30 TABLET | Refills: 1 | Status: SHIPPED | OUTPATIENT
Start: 2025-07-27

## 2025-07-28 NOTE — PATIENT INSTRUCTIONS
"Recommendations from today's MTM visit:                                                      Please have your standing labs updated around 8/23/25.  Continue Rinvoq 15 mg daily. Report any significant side effects, infections, or planned surgeries to rheumatology.  Continue hydroxychloroquine 200 mg twice daily. Please attend eye exam in August 2025 as scheduled.  Continue acetaminophen 650 mg every 6 hours as needed.    Follow-up: 6 months. No rheumatologist follow up currently scheduled.    It was great speaking with you today.  I value your experience and would be very thankful for your time in providing feedback in our clinic survey. In the next few days, you may receive an email or text message from [facility] [employer] with a link to a survey related to your  clinical pharmacist.\"     To schedule another MTM appointment, please call the clinic directly or you may call the MTM scheduling line at [phone].    My Clinical Pharmacist's contact information:                                                      Please feel free to contact me with any questions or concerns you have.      Mindi Murry, PharmD  Medication Therapy Management Pharmacist  [facility]    "

## 2025-08-02 ENCOUNTER — HEALTH MAINTENANCE LETTER (OUTPATIENT)
Age: 65
End: 2025-08-02

## 2025-08-04 ENCOUNTER — ANTICOAGULATION THERAPY VISIT (OUTPATIENT)
Dept: ANTICOAGULATION | Facility: CLINIC | Age: 65
End: 2025-08-04
Payer: COMMERCIAL

## 2025-08-04 DIAGNOSIS — Z79.01 LONG TERM CURRENT USE OF ANTICOAGULANT THERAPY: ICD-10-CM

## 2025-08-04 DIAGNOSIS — I26.99 PULMONARY EMBOLI (H): Primary | ICD-10-CM

## 2025-08-04 LAB — INR HOME MONITORING: 2.7 RATIO (ref 2–3)

## 2025-08-13 ENCOUNTER — TELEPHONE (OUTPATIENT)
Dept: PHARMACY | Facility: OTHER | Age: 65
End: 2025-08-13
Payer: COMMERCIAL

## 2025-08-18 ENCOUNTER — ANTICOAGULATION THERAPY VISIT (OUTPATIENT)
Dept: ANTICOAGULATION | Facility: CLINIC | Age: 65
End: 2025-08-18
Payer: COMMERCIAL

## 2025-08-18 DIAGNOSIS — I26.99 PULMONARY EMBOLI (H): Primary | ICD-10-CM

## 2025-08-18 DIAGNOSIS — Z79.01 LONG TERM CURRENT USE OF ANTICOAGULANT THERAPY: ICD-10-CM

## 2025-08-18 LAB — INR HOME MONITORING: 2.7 RATIO (ref 2–3)

## 2025-09-02 ENCOUNTER — ANTICOAGULATION THERAPY VISIT (OUTPATIENT)
Dept: ANTICOAGULATION | Facility: CLINIC | Age: 65
End: 2025-09-02
Payer: COMMERCIAL

## 2025-09-02 DIAGNOSIS — Z79.01 LONG TERM CURRENT USE OF ANTICOAGULANT THERAPY: ICD-10-CM

## 2025-09-02 DIAGNOSIS — I26.99 PULMONARY EMBOLI (H): Primary | ICD-10-CM

## 2025-09-02 LAB — INR HOME MONITORING: 2.8 RATIO (ref 2–3)

## (undated) DEVICE — CLIP HORIZON MED BLUE 002200

## (undated) DEVICE — LINEN GOWN XLG 5407

## (undated) DEVICE — PREP POVIDONE IODINE SCRUB 7.5% 4OZ APL82212

## (undated) DEVICE — 7FR X 24CM SUPER ARROW-FLEX SHEATH SET W/INTEGRAL HEMOSTASIS VALVE/SIDE PORT

## (undated) DEVICE — SU ETHILON 3-0 PS-1 18" 1663H

## (undated) DEVICE — KIT HAND CONTROL ACIST 014644 AR-P54

## (undated) DEVICE — KIT ARTERIAL LINE 2GA 12CM INTRO NDL ASK-04510-HF

## (undated) DEVICE — PREP CHLORAPREP 26ML TINTED ORANGE  260815

## (undated) DEVICE — ESU GROUND PAD ADULT W/CORD E7507

## (undated) DEVICE — MANIFOLD KIT ANGIO AUTOMATED 014613

## (undated) DEVICE — COVER ULTRASOUND PROBE W/GEL FLEXI-FEEL 6"X58" LF  25-FF658

## (undated) DEVICE — PREP CHLORHEXIDINE 4% 4OZ (HIBICLENS) 57504

## (undated) DEVICE — ENDO VALVE SUCTION BRONCH EVIS MAJ-209

## (undated) DEVICE — LINEN TOWEL PACK X30 5481

## (undated) DEVICE — SU PROLENE 5-0 RB-2DA 30" 8710H

## (undated) DEVICE — SU PROLENE 4-0 SHDA 36" 8521H

## (undated) DEVICE — SU ETHIBOND 0 CT-1 CR 8X18" CX21D

## (undated) DEVICE — DRAPE TIBURON CARDIOVASCULAR PERI-GROIN LF 9154

## (undated) DEVICE — SPECIMEN CONTAINER 60MLW/10% FORMALIN 59601

## (undated) DEVICE — TRAY CATH 8IN 7FR ARROWG+ ARD CDC-45703-1A

## (undated) DEVICE — LINEN TOWEL PACK X6 WHITE 5487

## (undated) DEVICE — Device

## (undated) DEVICE — SU PROLENE 6-0 C-1DA 30" 8706H

## (undated) DEVICE — SPONGE RAY-TEC 4X8" 7318

## (undated) DEVICE — PROTECTOR ARM ONE-STEP TRENDELENBURG 40418

## (undated) DEVICE — DRAPE IOBAN INCISE 23X17" 6650EZ

## (undated) DEVICE — PREP POVIDONE IODINE SOLUTION 10% 4OZ

## (undated) DEVICE — ENDO VALVE BX EVIS MAJ-210

## (undated) DEVICE — SU ETHIBOND 0 TIE 6X30" X306H

## (undated) DEVICE — 17FR 23CM ARTERIAL ECMO CANNULA WITH BIOLINE COATING

## (undated) DEVICE — PACK HEART LEFT CUSTOM

## (undated) DEVICE — SU PLEDGET SOFT TFE 3/8"X3/26"X1/16" PCP40

## (undated) DEVICE — ANTIFOG SOLUTION W/FOAM PAD CF-1001

## (undated) DEVICE — VESSEL LOOPS YELLOW MAXI 31145694

## (undated) DEVICE — DEFIB PRO-PADZ LVP LQD GEL ADULT 8900-2105-01

## (undated) DEVICE — WIRE GUIDE 0.035"X145CM AMPLATZ XSTIFF J THSCF-35-145-3-AES

## (undated) DEVICE — SURGICEL HEMOSTAT 4X8" 1952

## (undated) DEVICE — INTRODUCER SHEATH 4FRX40CM MICROPUNC PED G47946

## (undated) DEVICE — TUBING PRESSURE 30"

## (undated) DEVICE — WIRE GUIDE 0.035"X150CM EMERALD J TIP 502521

## (undated) DEVICE — DRSG ABDOMINAL 07 1/2X8" 7197D

## (undated) DEVICE — CLIP HORIZON SM RED WIDE SLOT 001201

## (undated) DEVICE — PREP SKIN SCRUB TRAY 4461A

## (undated) DEVICE — INTRO SHEATH 6FRX25CM PINNACLE RSS606

## (undated) DEVICE — DRAPE SHEET REV FOLD 3/4 9349

## (undated) DEVICE — GLOVE SENSICARE 7.5 MSG1075 LATEX FREE

## (undated) DEVICE — CANNULA VENOUS 25FR LONG

## (undated) DEVICE — ENDO ADPT BRONCH SWIVEL Y A1002

## (undated) DEVICE — TOURNIQUET VASCULAR KIT 7 1/2" 79012

## (undated) DEVICE — SU VICRYL 3-0 FS-1 27" J442H

## (undated) DEVICE — SU PROLENE 4-0 RB-1DA 36" 8557H

## (undated) DEVICE — WIPES FOLEY CARE SURESTEP PROVON DFC100

## (undated) DEVICE — PEN MARKING SKIN

## (undated) DEVICE — SU VICRYL 0 CTX 36" J370H

## (undated) DEVICE — SOL NACL 0.9% IRRIG 1000ML BOTTLE 2F7124

## (undated) DEVICE — SU VICRYL 2-0 CT-1 27" UND J259H

## (undated) RX ORDER — ONDANSETRON 2 MG/ML
INJECTION INTRAMUSCULAR; INTRAVENOUS
Status: DISPENSED
Start: 2019-09-17

## (undated) RX ORDER — FENTANYL CITRATE 50 UG/ML
INJECTION, SOLUTION INTRAMUSCULAR; INTRAVENOUS
Status: DISPENSED
Start: 2021-07-16

## (undated) RX ORDER — FENTANYL CITRATE 50 UG/ML
INJECTION, SOLUTION INTRAMUSCULAR; INTRAVENOUS
Status: DISPENSED
Start: 2019-09-17

## (undated) RX ORDER — LIDOCAINE HYDROCHLORIDE 10 MG/ML
INJECTION, SOLUTION EPIDURAL; INFILTRATION; INTRACAUDAL; PERINEURAL
Status: DISPENSED
Start: 2020-01-16

## (undated) RX ORDER — ALBUTEROL SULFATE 90 UG/1
AEROSOL, METERED RESPIRATORY (INHALATION)
Status: DISPENSED
Start: 2019-09-17

## (undated) RX ORDER — BUPIVACAINE HYDROCHLORIDE 5 MG/ML
INJECTION, SOLUTION EPIDURAL; INTRACAUDAL
Status: DISPENSED
Start: 2018-05-10

## (undated) RX ORDER — TRIAMCINOLONE ACETONIDE 40 MG/ML
INJECTION, SUSPENSION INTRA-ARTICULAR; INTRAMUSCULAR
Status: DISPENSED
Start: 2022-11-28

## (undated) RX ORDER — METHYLPREDNISOLONE ACETATE 80 MG/ML
INJECTION, SUSPENSION INTRA-ARTICULAR; INTRALESIONAL; INTRAMUSCULAR; SOFT TISSUE
Status: DISPENSED
Start: 2018-11-02

## (undated) RX ORDER — METHYLPREDNISOLONE ACETATE 40 MG/ML
INJECTION, SUSPENSION INTRA-ARTICULAR; INTRALESIONAL; INTRAMUSCULAR; SOFT TISSUE
Status: DISPENSED
Start: 2018-04-10

## (undated) RX ORDER — LIDOCAINE HYDROCHLORIDE 10 MG/ML
INJECTION, SOLUTION INFILTRATION; PERINEURAL
Status: DISPENSED
Start: 2017-08-08

## (undated) RX ORDER — BUPIVACAINE HYDROCHLORIDE 5 MG/ML
INJECTION, SOLUTION EPIDURAL; INTRACAUDAL
Status: DISPENSED
Start: 2018-11-02

## (undated) RX ORDER — TRIAMCINOLONE ACETONIDE 40 MG/ML
INJECTION, SUSPENSION INTRA-ARTICULAR; INTRAMUSCULAR
Status: DISPENSED
Start: 2017-08-08

## (undated) RX ORDER — LIDOCAINE HYDROCHLORIDE 20 MG/ML
INJECTION, SOLUTION EPIDURAL; INFILTRATION; INTRACAUDAL; PERINEURAL
Status: DISPENSED
Start: 2019-09-17

## (undated) RX ORDER — FENTANYL CITRATE 50 UG/ML
INJECTION, SOLUTION INTRAMUSCULAR; INTRAVENOUS
Status: DISPENSED
Start: 2020-01-16

## (undated) RX ORDER — SODIUM NITROPRUSSIDE 25 MG/ML
INJECTION INTRAVENOUS
Status: DISPENSED
Start: 2019-09-09

## (undated) RX ORDER — PROPOFOL 10 MG/ML
INJECTION, EMULSION INTRAVENOUS
Status: DISPENSED
Start: 2019-09-10

## (undated) RX ORDER — GLYCOPYRROLATE 0.2 MG/ML
INJECTION, SOLUTION INTRAMUSCULAR; INTRAVENOUS
Status: DISPENSED
Start: 2023-01-31

## (undated) RX ORDER — NALOXONE HYDROCHLORIDE 0.4 MG/ML
INJECTION, SOLUTION INTRAMUSCULAR; INTRAVENOUS; SUBCUTANEOUS
Status: DISPENSED
Start: 2023-01-31

## (undated) RX ORDER — REGADENOSON 0.08 MG/ML
INJECTION, SOLUTION INTRAVENOUS
Status: DISPENSED
Start: 2017-05-22

## (undated) RX ORDER — LIDOCAINE HYDROCHLORIDE 10 MG/ML
INJECTION, SOLUTION EPIDURAL; INFILTRATION; INTRACAUDAL; PERINEURAL
Status: DISPENSED
Start: 2018-05-10

## (undated) RX ORDER — LIDOCAINE HYDROCHLORIDE 10 MG/ML
INJECTION, SOLUTION EPIDURAL; INFILTRATION; INTRACAUDAL; PERINEURAL
Status: DISPENSED
Start: 2019-12-31

## (undated) RX ORDER — HEPARIN SODIUM 1000 [USP'U]/ML
INJECTION, SOLUTION INTRAVENOUS; SUBCUTANEOUS
Status: DISPENSED
Start: 2019-09-17

## (undated) RX ORDER — METHYLPREDNISOLONE ACETATE 80 MG/ML
INJECTION, SUSPENSION INTRA-ARTICULAR; INTRALESIONAL; INTRAMUSCULAR; SOFT TISSUE
Status: DISPENSED
Start: 2018-05-10

## (undated) RX ORDER — DEXAMETHASONE SODIUM PHOSPHATE 4 MG/ML
INJECTION, SOLUTION INTRA-ARTICULAR; INTRALESIONAL; INTRAMUSCULAR; INTRAVENOUS; SOFT TISSUE
Status: DISPENSED
Start: 2019-09-17

## (undated) RX ORDER — CEFAZOLIN SODIUM 1 G/3ML
INJECTION, POWDER, FOR SOLUTION INTRAMUSCULAR; INTRAVENOUS
Status: DISPENSED
Start: 2019-09-17

## (undated) RX ORDER — LIDOCAINE HYDROCHLORIDE 10 MG/ML
INJECTION, SOLUTION EPIDURAL; INFILTRATION; INTRACAUDAL; PERINEURAL
Status: DISPENSED
Start: 2022-11-28

## (undated) RX ORDER — DEXAMETHASONE SODIUM PHOSPHATE 10 MG/ML
INJECTION, SOLUTION INTRAMUSCULAR; INTRAVENOUS
Status: DISPENSED
Start: 2019-08-12

## (undated) RX ORDER — LIDOCAINE HYDROCHLORIDE 10 MG/ML
INJECTION, SOLUTION INFILTRATION; PERINEURAL
Status: DISPENSED
Start: 2018-04-10

## (undated) RX ORDER — DEXAMETHASONE SODIUM PHOSPHATE 10 MG/ML
INJECTION, SOLUTION INTRAMUSCULAR; INTRAVENOUS
Status: DISPENSED
Start: 2019-01-31

## (undated) RX ORDER — LIDOCAINE HYDROCHLORIDE 10 MG/ML
INJECTION, SOLUTION EPIDURAL; INFILTRATION; INTRACAUDAL; PERINEURAL
Status: DISPENSED
Start: 2018-11-02

## (undated) RX ORDER — HEPARIN SODIUM 1000 [USP'U]/ML
INJECTION, SOLUTION INTRAVENOUS; SUBCUTANEOUS
Status: DISPENSED
Start: 2019-09-09

## (undated) RX ORDER — LIDOCAINE HYDROCHLORIDE 40 MG/ML
SOLUTION TOPICAL
Status: DISPENSED
Start: 2023-01-31

## (undated) RX ORDER — LIDOCAINE HYDROCHLORIDE 10 MG/ML
INJECTION, SOLUTION EPIDURAL; INFILTRATION; INTRACAUDAL; PERINEURAL
Status: DISPENSED
Start: 2019-09-09

## (undated) RX ORDER — LIDOCAINE HYDROCHLORIDE 10 MG/ML
INJECTION, SOLUTION EPIDURAL; INFILTRATION; INTRACAUDAL; PERINEURAL
Status: DISPENSED
Start: 2019-01-31

## (undated) RX ORDER — DIPHENHYDRAMINE HYDROCHLORIDE 50 MG/ML
INJECTION INTRAMUSCULAR; INTRAVENOUS
Status: DISPENSED
Start: 2021-07-16

## (undated) RX ORDER — SIMETHICONE 40MG/0.6ML
SUSPENSION, DROPS(FINAL DOSAGE FORM)(ML) ORAL
Status: DISPENSED
Start: 2021-07-16

## (undated) RX ORDER — FLUMAZENIL 0.1 MG/ML
INJECTION, SOLUTION INTRAVENOUS
Status: DISPENSED
Start: 2023-01-31

## (undated) RX ORDER — LIDOCAINE HYDROCHLORIDE 10 MG/ML
INJECTION, SOLUTION EPIDURAL; INFILTRATION; INTRACAUDAL; PERINEURAL
Status: DISPENSED
Start: 2019-08-12

## (undated) RX ORDER — TRIAMCINOLONE ACETONIDE 40 MG/ML
INJECTION, SUSPENSION INTRA-ARTICULAR; INTRAMUSCULAR
Status: DISPENSED
Start: 2019-12-31

## (undated) RX ORDER — PROPOFOL 10 MG/ML
INJECTION, EMULSION INTRAVENOUS
Status: DISPENSED
Start: 2019-09-17

## (undated) RX ORDER — FENTANYL CITRATE 50 UG/ML
INJECTION, SOLUTION INTRAMUSCULAR; INTRAVENOUS
Status: DISPENSED
Start: 2023-01-31